# Patient Record
Sex: FEMALE | Race: WHITE | NOT HISPANIC OR LATINO | Employment: UNEMPLOYED | URBAN - METROPOLITAN AREA
[De-identification: names, ages, dates, MRNs, and addresses within clinical notes are randomized per-mention and may not be internally consistent; named-entity substitution may affect disease eponyms.]

---

## 2017-06-12 ENCOUNTER — TRANSCRIBE ORDERS (OUTPATIENT)
Dept: ADMINISTRATIVE | Facility: HOSPITAL | Age: 47
End: 2017-06-12

## 2017-06-12 ENCOUNTER — ALLSCRIPTS OFFICE VISIT (OUTPATIENT)
Dept: OTHER | Facility: OTHER | Age: 47
End: 2017-06-12

## 2017-06-12 DIAGNOSIS — D64.9 ANEMIA: ICD-10-CM

## 2017-06-12 DIAGNOSIS — R63.4 ABNORMAL WEIGHT LOSS: ICD-10-CM

## 2017-06-12 DIAGNOSIS — R74.8 ABNORMAL LEVELS OF OTHER SERUM ENZYMES: ICD-10-CM

## 2017-06-12 DIAGNOSIS — R63.4 LOSS OF WEIGHT: Primary | ICD-10-CM

## 2017-06-25 ENCOUNTER — APPOINTMENT (EMERGENCY)
Dept: CT IMAGING | Facility: HOSPITAL | Age: 47
End: 2017-06-25
Payer: COMMERCIAL

## 2017-06-25 ENCOUNTER — HOSPITAL ENCOUNTER (EMERGENCY)
Facility: HOSPITAL | Age: 47
Discharge: HOME | End: 2017-06-25
Attending: EMERGENCY MEDICINE | Admitting: EMERGENCY MEDICINE
Payer: COMMERCIAL

## 2017-06-25 VITALS
DIASTOLIC BLOOD PRESSURE: 77 MMHG | OXYGEN SATURATION: 97 % | SYSTOLIC BLOOD PRESSURE: 125 MMHG | HEART RATE: 95 BPM | TEMPERATURE: 98.5 F | RESPIRATION RATE: 18 BRPM

## 2017-06-25 DIAGNOSIS — E87.1 HYPONATREMIA: ICD-10-CM

## 2017-06-25 DIAGNOSIS — F41.9 ANXIETY: Primary | ICD-10-CM

## 2017-06-25 LAB
ALBUMIN SERPL BCP-MCNC: 4.2 G/DL (ref 3.5–5)
ALP SERPL-CCNC: 166 U/L (ref 46–116)
ALT SERPL W P-5'-P-CCNC: 27 U/L (ref 12–78)
AMPHETAMINES SERPL QL SCN: NEGATIVE
ANION GAP BLD CALC-SCNC: 13 MMOL/L (ref 4–13)
ANION GAP SERPL CALCULATED.3IONS-SCNC: 7 MMOL/L (ref 4–13)
AST SERPL W P-5'-P-CCNC: 18 U/L (ref 5–45)
ATRIAL RATE: 108 BPM
BARBITURATES UR QL: NEGATIVE
BASOPHILS # BLD AUTO: 0.09 THOUSANDS/ΜL (ref 0–0.1)
BASOPHILS NFR BLD AUTO: 1 % (ref 0–1)
BENZODIAZ UR QL: NEGATIVE
BILIRUB SERPL-MCNC: 0.25 MG/DL (ref 0.2–1)
BILIRUB UR QL STRIP: NEGATIVE
BUN BLD-MCNC: 14 MG/DL (ref 5–25)
BUN SERPL-MCNC: 17 MG/DL (ref 5–25)
CA-I BLD-SCNC: 1.18 MMOL/L (ref 1.12–1.32)
CALCIUM SERPL-MCNC: 10 MG/DL (ref 8.3–10.1)
CHLORIDE BLD-SCNC: 105 MMOL/L (ref 100–108)
CHLORIDE SERPL-SCNC: 96 MMOL/L (ref 100–108)
CLARITY UR: ABNORMAL
CO2 SERPL-SCNC: 26 MMOL/L (ref 21–32)
COCAINE UR QL: NEGATIVE
COLOR UR: YELLOW
CREAT BLD-MCNC: 1.1 MG/DL (ref 0.6–1.3)
CREAT SERPL-MCNC: 1.36 MG/DL (ref 0.6–1.3)
EOSINOPHIL # BLD AUTO: 0.17 THOUSAND/ΜL (ref 0–0.61)
EOSINOPHIL NFR BLD AUTO: 1 % (ref 0–6)
ERYTHROCYTE [DISTWIDTH] IN BLOOD BY AUTOMATED COUNT: 16.6 % (ref 11.6–15.1)
ETHANOL SERPL-MCNC: 82 MG/DL (ref 0–3)
GFR SERPL CREATININE-BSD FRML MDRD: 41.5 ML/MIN/1.73SQ M
GFR SERPL CREATININE-BSD FRML MDRD: 53 ML/MIN/1.73SQ M
GLUCOSE SERPL-MCNC: 76 MG/DL (ref 65–140)
GLUCOSE SERPL-MCNC: 94 MG/DL (ref 65–140)
GLUCOSE UR STRIP-MCNC: NEGATIVE MG/DL
HCG UR QL: NORMAL
HCT VFR BLD AUTO: 43.7 % (ref 34.8–46.1)
HCT VFR BLD CALC: 39 % (ref 34.8–46.1)
HGB BLD-MCNC: 14.6 G/DL (ref 11.5–15.4)
HGB BLDA-MCNC: 13.3 G/DL (ref 11.5–15.4)
HGB UR QL STRIP.AUTO: ABNORMAL
KETONES UR STRIP-MCNC: NEGATIVE MG/DL
LEUKOCYTE ESTERASE UR QL STRIP: NEGATIVE
LIPASE SERPL-CCNC: 302 U/L (ref 73–393)
LYMPHOCYTES # BLD AUTO: 2.62 THOUSANDS/ΜL (ref 0.6–4.47)
LYMPHOCYTES NFR BLD AUTO: 16 % (ref 14–44)
MCH RBC QN AUTO: 34.3 PG (ref 26.8–34.3)
MCHC RBC AUTO-ENTMCNC: 33.4 G/DL (ref 31.4–37.4)
MCV RBC AUTO: 103 FL (ref 82–98)
METHADONE UR QL: NEGATIVE
MONOCYTES # BLD AUTO: 0.77 THOUSAND/ΜL (ref 0.17–1.22)
MONOCYTES NFR BLD AUTO: 5 % (ref 4–12)
NEUTROPHILS # BLD AUTO: 13.21 THOUSANDS/ΜL (ref 1.85–7.62)
NEUTS SEG NFR BLD AUTO: 77 % (ref 43–75)
NITRITE UR QL STRIP: NEGATIVE
NRBC BLD AUTO-RTO: 0 /100 WBCS
OPIATES UR QL SCN: NEGATIVE
PCO2 BLD: 23 MMOL/L (ref 21–32)
PCP UR QL: NEGATIVE
PH UR STRIP.AUTO: 7 [PH] (ref 4.5–8)
PLATELET # BLD AUTO: 349 THOUSANDS/UL (ref 149–390)
PMV BLD AUTO: 10.1 FL (ref 8.9–12.7)
POTASSIUM BLD-SCNC: 4.2 MMOL/L (ref 3.5–5.3)
POTASSIUM SERPL-SCNC: 4.2 MMOL/L (ref 3.5–5.3)
PR INTERVAL: 140 MS
PROT SERPL-MCNC: 8.2 G/DL (ref 6.4–8.2)
PROT UR STRIP-MCNC: NEGATIVE MG/DL
QRS AXIS: 83 DEGREES
QRSD INTERVAL: 72 MS
QT INTERVAL: 310 MS
QTC INTERVAL: 415 MS
RBC # BLD AUTO: 4.26 MILLION/UL (ref 3.81–5.12)
SODIUM BLD-SCNC: 135 MMOL/L (ref 136–145)
SODIUM SERPL-SCNC: 129 MMOL/L (ref 136–145)
SP GR UR STRIP.AUTO: 1.01 (ref 1–1.03)
SPECIMEN SOURCE: ABNORMAL
SPECIMEN SOURCE: NORMAL
T WAVE AXIS: -42 DEGREES
THC UR QL: POSITIVE
TROPONIN I BLD-MCNC: 0 NG/ML (ref 0–0.08)
TSH SERPL DL<=0.05 MIU/L-ACNC: 3.11 UIU/ML (ref 0.36–3.74)
UROBILINOGEN UR QL STRIP.AUTO: 0.2 E.U./DL
VENTRICULAR RATE: 108 BPM
WBC # BLD AUTO: 16.86 THOUSAND/UL (ref 4.31–10.16)

## 2017-06-25 PROCEDURE — 80307 DRUG TEST PRSMV CHEM ANLYZR: CPT | Performed by: EMERGENCY MEDICINE

## 2017-06-25 PROCEDURE — 81002 URINALYSIS NONAUTO W/O SCOPE: CPT | Performed by: EMERGENCY MEDICINE

## 2017-06-25 PROCEDURE — 80053 COMPREHEN METABOLIC PANEL: CPT | Performed by: EMERGENCY MEDICINE

## 2017-06-25 PROCEDURE — 99285 EMERGENCY DEPT VISIT HI MDM: CPT

## 2017-06-25 PROCEDURE — 84484 ASSAY OF TROPONIN QUANT: CPT

## 2017-06-25 PROCEDURE — 96375 TX/PRO/DX INJ NEW DRUG ADDON: CPT

## 2017-06-25 PROCEDURE — 83690 ASSAY OF LIPASE: CPT | Performed by: EMERGENCY MEDICINE

## 2017-06-25 PROCEDURE — 81025 URINE PREGNANCY TEST: CPT | Performed by: EMERGENCY MEDICINE

## 2017-06-25 PROCEDURE — 96374 THER/PROPH/DIAG INJ IV PUSH: CPT

## 2017-06-25 PROCEDURE — 85025 COMPLETE CBC W/AUTO DIFF WBC: CPT | Performed by: EMERGENCY MEDICINE

## 2017-06-25 PROCEDURE — 96361 HYDRATE IV INFUSION ADD-ON: CPT

## 2017-06-25 PROCEDURE — 96376 TX/PRO/DX INJ SAME DRUG ADON: CPT

## 2017-06-25 PROCEDURE — G0480 DRUG TEST DEF 1-7 CLASSES: HCPCS | Performed by: EMERGENCY MEDICINE

## 2017-06-25 PROCEDURE — 80047 BASIC METABLC PNL IONIZED CA: CPT

## 2017-06-25 PROCEDURE — 93005 ELECTROCARDIOGRAM TRACING: CPT | Performed by: EMERGENCY MEDICINE

## 2017-06-25 PROCEDURE — 36415 COLL VENOUS BLD VENIPUNCTURE: CPT | Performed by: EMERGENCY MEDICINE

## 2017-06-25 PROCEDURE — 85014 HEMATOCRIT: CPT

## 2017-06-25 PROCEDURE — 74177 CT ABD & PELVIS W/CONTRAST: CPT

## 2017-06-25 PROCEDURE — 84443 ASSAY THYROID STIM HORMONE: CPT | Performed by: EMERGENCY MEDICINE

## 2017-06-25 PROCEDURE — 80320 DRUG SCREEN QUANTALCOHOLS: CPT | Performed by: EMERGENCY MEDICINE

## 2017-06-25 RX ORDER — PEG-3350, SODIUM SULFATE, SODIUM CHLORIDE, POTASSIUM CHLORIDE, SODIUM ASCORBATE AND ASCORBIC ACID 7.5-2.691G
KIT ORAL
COMMUNITY
Start: 2017-06-12 | End: 2017-12-15

## 2017-06-25 RX ORDER — LISINOPRIL 10 MG/1
10 TABLET ORAL DAILY
COMMUNITY
End: 2018-05-30 | Stop reason: HOSPADM

## 2017-06-25 RX ORDER — VENLAFAXINE 37.5 MG/1
75 TABLET ORAL DAILY
COMMUNITY
End: 2018-05-20

## 2017-06-25 RX ORDER — OMEPRAZOLE 40 MG/1
40 CAPSULE, DELAYED RELEASE ORAL DAILY
COMMUNITY
End: 2018-05-20

## 2017-06-25 RX ORDER — LITHIUM CARBONATE 300 MG
TABLET ORAL
COMMUNITY
End: 2017-12-15

## 2017-06-25 RX ORDER — ONDANSETRON 2 MG/ML
4 INJECTION INTRAMUSCULAR; INTRAVENOUS ONCE
Status: COMPLETED | OUTPATIENT
Start: 2017-06-25 | End: 2017-06-25

## 2017-06-25 RX ORDER — HYOSCYAMINE SULFATE 0.125 MG
TABLET ORAL
COMMUNITY
End: 2017-12-15

## 2017-06-25 RX ORDER — ONDANSETRON 4 MG/1
TABLET, FILM COATED ORAL
COMMUNITY
End: 2017-12-15

## 2017-06-25 RX ORDER — DICYCLOMINE HCL 20 MG
TABLET ORAL
COMMUNITY
End: 2017-12-15

## 2017-06-25 RX ORDER — LORAZEPAM 2 MG/ML
1 INJECTION INTRAMUSCULAR ONCE
Status: COMPLETED | OUTPATIENT
Start: 2017-06-25 | End: 2017-06-25

## 2017-06-25 RX ORDER — CLONAZEPAM 1 MG/1
TABLET ORAL
COMMUNITY
End: 2017-12-15

## 2017-06-25 RX ADMIN — SODIUM CHLORIDE 1000 ML: 0.9 INJECTION, SOLUTION INTRAVENOUS at 12:37

## 2017-06-25 RX ADMIN — LORAZEPAM 1 MG: 2 INJECTION INTRAMUSCULAR; INTRAVENOUS at 14:46

## 2017-06-25 RX ADMIN — SODIUM CHLORIDE 1000 ML: 0.9 INJECTION, SOLUTION INTRAVENOUS at 14:43

## 2017-06-25 RX ADMIN — IODIXANOL 100 ML: 320 INJECTION, SOLUTION INTRAVASCULAR at 13:49

## 2017-06-25 RX ADMIN — LORAZEPAM 1 MG: 2 INJECTION INTRAMUSCULAR; INTRAVENOUS at 12:28

## 2017-06-25 RX ADMIN — ONDANSETRON 4 MG: 2 INJECTION INTRAMUSCULAR; INTRAVENOUS at 12:26

## 2017-07-05 ENCOUNTER — GENERIC CONVERSION - ENCOUNTER (OUTPATIENT)
Dept: OTHER | Facility: OTHER | Age: 47
End: 2017-07-05

## 2017-07-14 ENCOUNTER — GENERIC CONVERSION - ENCOUNTER (OUTPATIENT)
Dept: OTHER | Facility: OTHER | Age: 47
End: 2017-07-14

## 2017-12-15 ENCOUNTER — APPOINTMENT (EMERGENCY)
Dept: RADIOLOGY | Facility: HOSPITAL | Age: 47
End: 2017-12-15
Payer: COMMERCIAL

## 2017-12-15 ENCOUNTER — HOSPITAL ENCOUNTER (EMERGENCY)
Facility: HOSPITAL | Age: 47
Discharge: HOME | End: 2017-12-15
Attending: EMERGENCY MEDICINE
Payer: COMMERCIAL

## 2017-12-15 VITALS
HEART RATE: 95 BPM | DIASTOLIC BLOOD PRESSURE: 58 MMHG | TEMPERATURE: 97.5 F | SYSTOLIC BLOOD PRESSURE: 124 MMHG | OXYGEN SATURATION: 97 % | RESPIRATION RATE: 16 BRPM | WEIGHT: 180 LBS

## 2017-12-15 DIAGNOSIS — J06.9 URI (UPPER RESPIRATORY INFECTION): Primary | ICD-10-CM

## 2017-12-15 DIAGNOSIS — J02.9 SORE THROAT: ICD-10-CM

## 2017-12-15 LAB
ALBUMIN SERPL BCP-MCNC: 3.6 G/DL (ref 3.5–5)
ALP SERPL-CCNC: 81 U/L (ref 46–116)
ALT SERPL W P-5'-P-CCNC: 21 U/L (ref 12–78)
ANION GAP SERPL CALCULATED.3IONS-SCNC: 10 MMOL/L (ref 4–13)
ANISOCYTOSIS BLD QL SMEAR: PRESENT
AST SERPL W P-5'-P-CCNC: 16 U/L (ref 5–45)
BACTERIA UR QL AUTO: ABNORMAL /HPF
BASOPHILS # BLD MANUAL: 0 THOUSAND/UL (ref 0–0.1)
BASOPHILS NFR MAR MANUAL: 0 % (ref 0–1)
BILIRUB SERPL-MCNC: 0.27 MG/DL (ref 0.2–1)
BILIRUB UR QL STRIP: NEGATIVE
BUN SERPL-MCNC: 7 MG/DL (ref 5–25)
CALCIUM SERPL-MCNC: 9.7 MG/DL (ref 8.3–10.1)
CHLORIDE SERPL-SCNC: 99 MMOL/L (ref 100–108)
CLARITY UR: CLEAR
CO2 SERPL-SCNC: 25 MMOL/L (ref 21–32)
COLOR UR: YELLOW
CREAT SERPL-MCNC: 0.72 MG/DL (ref 0.6–1.3)
DEPRECATED D DIMER PPP: 319 NG/ML (FEU) (ref 0–424)
EOSINOPHIL # BLD MANUAL: 0 THOUSAND/UL (ref 0–0.4)
EOSINOPHIL NFR BLD MANUAL: 0 % (ref 0–6)
ERYTHROCYTE [DISTWIDTH] IN BLOOD BY AUTOMATED COUNT: 16.1 % (ref 11.6–15.1)
EXT PREG TEST URINE: NORMAL
GFR SERPL CREATININE-BSD FRML MDRD: 99 ML/MIN/1.73SQ M
GLUCOSE SERPL-MCNC: 64 MG/DL (ref 65–140)
GLUCOSE UR STRIP-MCNC: NEGATIVE MG/DL
HCT VFR BLD AUTO: 40.7 % (ref 34.8–46.1)
HGB BLD-MCNC: 14.2 G/DL (ref 11.5–15.4)
HGB UR QL STRIP.AUTO: ABNORMAL
KETONES UR STRIP-MCNC: NEGATIVE MG/DL
LEUKOCYTE ESTERASE UR QL STRIP: NEGATIVE
LIPASE SERPL-CCNC: 117 U/L (ref 73–393)
LYMPHOCYTES # BLD AUTO: 3.38 THOUSAND/UL (ref 0.6–4.47)
LYMPHOCYTES # BLD AUTO: 45 % (ref 14–44)
MCH RBC QN AUTO: 35.4 PG (ref 26.8–34.3)
MCHC RBC AUTO-ENTMCNC: 34.9 G/DL (ref 31.4–37.4)
MCV RBC AUTO: 102 FL (ref 82–98)
MONOCYTES # BLD AUTO: 0.38 THOUSAND/UL (ref 0–1.22)
MONOCYTES NFR BLD: 5 % (ref 4–12)
NEUTROPHILS # BLD MANUAL: 3.76 THOUSAND/UL (ref 1.85–7.62)
NEUTS BAND NFR BLD MANUAL: 9 % (ref 0–8)
NEUTS SEG NFR BLD AUTO: 41 % (ref 43–75)
NITRITE UR QL STRIP: NEGATIVE
NON-SQ EPI CELLS URNS QL MICRO: ABNORMAL /HPF
PH UR STRIP.AUTO: 5 [PH] (ref 4.5–8)
PLATELET # BLD AUTO: 419 THOUSANDS/UL (ref 149–390)
PLATELET BLD QL SMEAR: ABNORMAL
PMV BLD AUTO: 9 FL (ref 8.9–12.7)
POTASSIUM SERPL-SCNC: 4.2 MMOL/L (ref 3.5–5.3)
PROT SERPL-MCNC: 7.6 G/DL (ref 6.4–8.2)
PROT UR STRIP-MCNC: NEGATIVE MG/DL
RBC # BLD AUTO: 4.01 MILLION/UL (ref 3.81–5.12)
RBC #/AREA URNS AUTO: ABNORMAL /HPF
SODIUM SERPL-SCNC: 134 MMOL/L (ref 136–145)
SP GR UR STRIP.AUTO: <=1.005 (ref 1–1.03)
STOMATOCYTES BLD QL SMEAR: PRESENT
TOTAL CELLS COUNTED SPEC: 100
UROBILINOGEN UR QL STRIP.AUTO: 0.2 E.U./DL
WBC # BLD AUTO: 7.52 THOUSAND/UL (ref 4.31–10.16)
WBC #/AREA URNS AUTO: ABNORMAL /HPF

## 2017-12-15 PROCEDURE — 99284 EMERGENCY DEPT VISIT MOD MDM: CPT

## 2017-12-15 PROCEDURE — 96374 THER/PROPH/DIAG INJ IV PUSH: CPT

## 2017-12-15 PROCEDURE — 83690 ASSAY OF LIPASE: CPT | Performed by: EMERGENCY MEDICINE

## 2017-12-15 PROCEDURE — 80053 COMPREHEN METABOLIC PANEL: CPT | Performed by: EMERGENCY MEDICINE

## 2017-12-15 PROCEDURE — 81025 URINE PREGNANCY TEST: CPT | Performed by: EMERGENCY MEDICINE

## 2017-12-15 PROCEDURE — 81001 URINALYSIS AUTO W/SCOPE: CPT

## 2017-12-15 PROCEDURE — 85007 BL SMEAR W/DIFF WBC COUNT: CPT | Performed by: EMERGENCY MEDICINE

## 2017-12-15 PROCEDURE — 85379 FIBRIN DEGRADATION QUANT: CPT | Performed by: EMERGENCY MEDICINE

## 2017-12-15 PROCEDURE — 85027 COMPLETE CBC AUTOMATED: CPT | Performed by: EMERGENCY MEDICINE

## 2017-12-15 PROCEDURE — 81002 URINALYSIS NONAUTO W/O SCOPE: CPT | Performed by: EMERGENCY MEDICINE

## 2017-12-15 PROCEDURE — 96361 HYDRATE IV INFUSION ADD-ON: CPT

## 2017-12-15 PROCEDURE — 36415 COLL VENOUS BLD VENIPUNCTURE: CPT | Performed by: EMERGENCY MEDICINE

## 2017-12-15 PROCEDURE — 96375 TX/PRO/DX INJ NEW DRUG ADDON: CPT

## 2017-12-15 PROCEDURE — 71020 HB CHEST X-RAY 2VW FRONTAL&LATL: CPT

## 2017-12-15 RX ORDER — GABAPENTIN 300 MG/1
100 CAPSULE ORAL 3 TIMES DAILY
COMMUNITY
End: 2018-05-20

## 2017-12-15 RX ORDER — FLUTICASONE PROPIONATE 50 MCG
2 SPRAY, SUSPENSION (ML) NASAL DAILY
COMMUNITY
End: 2018-10-31 | Stop reason: HOSPADM

## 2017-12-15 RX ORDER — BUDESONIDE AND FORMOTEROL FUMARATE DIHYDRATE 160; 4.5 UG/1; UG/1
2 AEROSOL RESPIRATORY (INHALATION) 2 TIMES DAILY
COMMUNITY
End: 2019-05-20 | Stop reason: SDUPTHER

## 2017-12-15 RX ORDER — QUETIAPINE FUMARATE 200 MG/1
200 TABLET, FILM COATED ORAL
COMMUNITY
End: 2018-05-20

## 2017-12-15 RX ORDER — DULOXETIN HYDROCHLORIDE 30 MG/1
30 CAPSULE, DELAYED RELEASE ORAL DAILY
COMMUNITY
End: 2018-05-30 | Stop reason: HOSPADM

## 2017-12-15 RX ORDER — DULOXETIN HYDROCHLORIDE 60 MG/1
30 CAPSULE, DELAYED RELEASE ORAL
COMMUNITY
End: 2018-05-30 | Stop reason: HOSPADM

## 2017-12-15 RX ORDER — ONDANSETRON 2 MG/ML
4 INJECTION INTRAMUSCULAR; INTRAVENOUS ONCE
Status: COMPLETED | OUTPATIENT
Start: 2017-12-15 | End: 2017-12-15

## 2017-12-15 RX ORDER — CARBAMAZEPINE 200 MG/1
100 TABLET, EXTENDED RELEASE ORAL DAILY
COMMUNITY
End: 2018-05-20

## 2017-12-15 RX ORDER — CLONAZEPAM 1 MG/1
0.5 TABLET ORAL 3 TIMES DAILY PRN
COMMUNITY
End: 2018-05-20

## 2017-12-15 RX ORDER — KETOROLAC TROMETHAMINE 30 MG/ML
30 INJECTION, SOLUTION INTRAMUSCULAR; INTRAVENOUS ONCE
Status: COMPLETED | OUTPATIENT
Start: 2017-12-15 | End: 2017-12-15

## 2017-12-15 RX ADMIN — KETOROLAC TROMETHAMINE 30 MG: 30 INJECTION, SOLUTION INTRAMUSCULAR at 12:18

## 2017-12-15 RX ADMIN — ONDANSETRON 4 MG: 2 INJECTION INTRAMUSCULAR; INTRAVENOUS at 12:27

## 2017-12-15 RX ADMIN — SODIUM CHLORIDE 1000 ML: 0.9 INJECTION, SOLUTION INTRAVENOUS at 11:37

## 2017-12-15 NOTE — ED NOTES
Medicated for nausea  States had a loose BM in the bathroom  Patient given some ginger ale and crackers- states she has had nothing to eat since yesterday        Matt Harvey RN  12/15/17 3618

## 2017-12-15 NOTE — ED NOTES
Pt back from 10 [facility], RN  12/15/17 205 [facility], RN  12/15/17 2281

## 2017-12-15 NOTE — ED NOTES
Sent by Dr Liban Owens for eval   Chronic cough for 1 month with hx COPD and smokes  Has had  Course of Z pack and steroids  Patient tearful, limp in WC, doesn't open her eyes, depressed affect     Dry cough noted- states it hurts her right ribs- had fractured rib last year  Developed N/V/D last night  C/o headache and weakness       Edison Alfaro, RN  12/15/17 8522

## 2017-12-15 NOTE — DISCHARGE INSTRUCTIONS
Upper Respiratory Infection   WHAT YOU NEED TO KNOW:   What is an upper respiratory infection? An upper respiratory infection is also called a common cold  It can affect your nose, throat, ears, and sinuses  What causes a cold? The common cold is caused by a virus  There are many different cold viruses, and each is contagious  This means the virus can be easily spread to another person when the sick person coughs or sneezes  The virus can also be spread if you touch something that a person with a cold has touched  You are more likely to get a cold in the winter  Your risk of getting a cold may be increased if you smoke cigarettes or have allergies, such as hay fever  What are the signs and symptoms of a cold? Cold symptoms are usually worst for the first 3 to 5 days  You may have any of the following:  · Runny or stuffy nose    · Sneezing and coughing    · Sore throat or hoarseness    · Red, watery, and sore eyes    · Fatigue     · Chills and fever    · Headache, body aches, or sore muscles  How is a cold treated? There is no cure for the common cold  Colds are caused by viruses and do not get better with antibiotics  Most people get better in 7 to 14 days  You may continue to cough for 2 to 3 weeks  The following may help decrease your symptoms:  · Decongestants  help reduce nasal congestion and help you breathe more easily  If you take decongestant pills, they may make you feel restless or cause problems with your sleep  Do not use decongestant sprays for more than a few days  · Cough suppressants  help reduce coughing  Ask your healthcare provider which type of cough medicine is best for you  · NSAIDs , such as ibuprofen, help decrease swelling, pain, and fever  NSAIDs can cause stomach bleeding or kidney problems in certain people  If you take blood thinner medicine, always ask your healthcare provider if NSAIDs are safe for you   Always read the medicine label and follow directions  · Acetaminophen  decreases pain and fever  It is available without a doctor's order  Ask how much to take and how often to take it  Follow directions  Read the labels of all other medicines you are using to see if they also contain acetaminophen, or ask your doctor or pharmacist  Acetaminophen can cause liver damage if not taken correctly  Do not use more than 4 grams (4,000 milligrams) total of acetaminophen in one day  How can I manage my cold? · Rest as much as possible  Slowly start to do more each day  · Drink more liquids as directed  Liquids will help thin and loosen mucus so you can cough it up  Liquids will also help prevent dehydration  Liquids that help prevent dehydration include water, fruit juice, and broth  Do not drink liquids that contain caffeine  Caffeine can increase your risk for dehydration  Ask your healthcare provider how much liquid to drink each day  · Soothe a sore throat  Gargle with warm salt water  This helps your sore throat feel better  Make salt water by dissolving ¼ teaspoon salt in 1 cup warm water  You may also suck on hard candy or throat lozenges  You may use a sore throat spray  · Use a humidifier or vaporizer  Use a cool mist humidifier or a vaporizer to increase air moisture in your home  This may make it easier for you to breathe and help decrease your cough  · Use saline nasal drops as directed  These help relieve congestion  · Apply petroleum-based jelly around the outside of your nostrils  This can decrease irritation from blowing your nose  · Do not smoke  Nicotine and other chemicals in cigarettes and cigars can make your symptoms worse  They can also cause infections such as bronchitis or pneumonia  Ask your healthcare provider for information if you currently smoke and need help to quit  E-cigarettes or smokeless tobacco still contain nicotine  Talk to your healthcare provider before you use these products    What can I do to prevent the spread of the common cold? · Try to stay away from other people during the first 2 to 3 days of your cold when it is more easily spread  · Do not share food or drinks  · Do not share hand towels with household members  · Wash your hands often, especially after you blow your nose  Turn away from other people and cover your mouth and nose with a tissue when you sneeze or cough  When should I seek immediate care? · You have chest pain or trouble breathing  When should I contact my healthcare provider? · You have a fever over 102ºF (39ºC)  · Your sore throat gets worse or you see white or yellow spots in your throat  · Your symptoms get worse after 3 to 5 days or your cold is not better in 14 days  · You have a rash anywhere on your skin  · You have large, tender lumps in your neck  · You have thick, green, or yellow drainage from your nose  · You cough up thick yellow, green, or bloody mucus  · You are vomiting for more than 24 hours and cannot keep fluids down  · You have a bad earache  · You have questions or concerns about your condition or care  CARE AGREEMENT:   You have the right to help plan your care  Learn about your health condition and how it may be treated  Discuss treatment options with your caregivers to decide what care you want to receive  You always have the right to refuse treatment  The above information is an  only  It is not intended as medical advice for individual conditions or treatments  Talk to your doctor, nurse or pharmacist before following any medical regimen to see if it is safe and effective for you  © 2017 [employer]  Information is for End User's use only and may not be sold, redistributed or otherwise used for commercial purposes  All illustrations and images included in CareNotes® are the copyrighted property of [employer]  or [employer]

## 2017-12-15 NOTE — ED NOTES
Patient is refusing CT of the neck  Requested to speak with Dr Thuy Mcdowell who was notified       Erickson Tena RN  12/15/17 0756

## 2017-12-15 NOTE — ED PROVIDER NOTES
History  Chief Complaint   Patient presents with    Cough     pt sent over from doctor  CAROLINA taylor for 1 month now with loss of voice  pt now with headache and diarrhea   had respiratory panel done which was negative  sent here for further evaluation and CXR  History provided by:  Patient   used: No    Cough   Cough characteristics:  Non-productive  Sputum characteristics:  Nondescript  Severity:  Mild  Onset quality:  Gradual  Duration:  4 weeks  Timing:  Intermittent  Progression:  Waxing and waning  Chronicity:  New  Smoker: yes    Context: upper respiratory infection and weather changes    Relieved by:  Nothing  Worsened by:  Nothing  Ineffective treatments:  Beta-agonist inhaler  Associated symptoms: sore throat    Associated symptoms: no chest pain, no chills, no fever, no headaches, no rash and no shortness of breath    Sore throat:     Severity:  Moderate    Onset quality:  Gradual    Duration:  2 weeks    Timing:  Intermittent    Progression:  Waxing and waning  Risk factors comment:  Smoker, Rib fractures 1 yr ago      Prior to Admission Medications   Prescriptions Last Dose Informant Patient Reported? Taking?    DULoxetine (CYMBALTA) 30 mg delayed release capsule 12/15/2017 at 0500  Yes Yes   Sig: Take 30 mg by mouth daily   DULoxetine (CYMBALTA) 60 mg delayed release capsule 12/14/2017  Yes Yes   Sig: Take 30 mg by mouth daily at bedtime   QUEtiapine (SEROquel) 200 mg tablet 12/14/2017 at Unknown time  Yes Yes   Sig: Take 200 mg by mouth daily at bedtime   budesonide-formoterol (SYMBICORT) 160-4 5 mcg/act inhaler 12/15/2017 at 0500  Yes Yes   Sig: Inhale 2 puffs 2 (two) times a day   carBAMazepine (TEGretol XR) 200 mg 12 hr tablet 12/15/2017 at 0500  Yes Yes   Sig: Take 100 mg by mouth daily   clonazePAM (KlonoPIN) 1 mg tablet 12/15/2017 at 0500  Yes Yes   Sig: Take 0 5 mg by mouth 3 (three) times a day as needed for seizures   fluticasone (FLONASE) 50 mcg/act nasal spray Past Month  Yes Yes   Si sprays into each nostril daily Not taking recently    gabapentin (NEURONTIN) 300 mg capsule 12/15/2017 at 0500  Yes Yes   Sig: Take 100 mg by mouth 3 (three) times a day   lisinopril (ZESTRIL) 10 mg tablet 12/15/2017 at 0500  Yes Yes   Sig: Take 10 mg by mouth daily     omeprazole (PriLOSEC) 40 MG capsule 12/15/2017 at 0500  Yes Yes   Sig: Take 40 mg by mouth daily     venlafaxine (EFFEXOR) 37 5 mg tablet 12/15/2017 at 0500  Yes Yes   Sig: Take 75 mg by mouth daily        Facility-Administered Medications: None       Past Medical History:   Diagnosis Date    Bipolar disorder ([facility] )     COPD (chronic obstructive pulmonary disease) ([facility] )     Depression     Hyperlipidemia     Hypertension     Psychiatric disorder        Past Surgical History:   Procedure Laterality Date    PANCREAS SURGERY      "pseudocysts" per patient's  Ricki Infante       History reviewed  No pertinent family history  I have reviewed and agree with the history as documented  Social History   Substance Use Topics    Smoking status: Current Every Day Smoker     Packs/day: 1 00     Types: Cigarettes    Smokeless tobacco: Never Used    Alcohol use Yes        Review of Systems   Constitutional: Negative for activity change, chills and fever  HENT: Positive for sore throat  Negative for facial swelling and trouble swallowing  Eyes: Negative for pain and visual disturbance  Respiratory: Positive for cough  Negative for chest tightness and shortness of breath  Cardiovascular: Negative for chest pain and leg swelling  Gastrointestinal: Negative for abdominal pain, blood in stool, diarrhea, nausea and vomiting  Genitourinary: Negative for dysuria and flank pain  Musculoskeletal: Negative for back pain, neck pain and neck stiffness  Skin: Negative for pallor and rash  Allergic/Immunologic: Negative for environmental allergies and immunocompromised state     Neurological: Negative for dizziness and headaches  Hematological: Negative for adenopathy  Does not bruise/bleed easily  Psychiatric/Behavioral: Negative for agitation and behavioral problems  All other systems reviewed and are negative  Physical Exam  ED Triage Vitals [12/15/17 1057]   Temperature Pulse Respirations Blood Pressure SpO2   97 5 °F (36 4 °C) 100 20 126/69 99 %      Temp Source Heart Rate Source Patient Position - Orthostatic VS BP Location FiO2 (%)   Oral -- -- -- --      Pain Score       7           Orthostatic Vital Signs  Vitals:    12/15/17 1057 12/15/17 1224   BP: 126/69 124/58   Pulse: 100 95       Physical Exam   Constitutional: She is oriented to person, place, and time  She appears well-developed and well-nourished  No distress  HENT:   Head: Normocephalic and atraumatic  Eyes: EOM are normal    Neck: Normal range of motion  Neck supple  Cardiovascular: Normal rate, regular rhythm, normal heart sounds and intact distal pulses  Pulmonary/Chest: Effort normal and breath sounds normal    Abdominal: Soft  Bowel sounds are normal  There is no tenderness  There is no rebound and no guarding  Musculoskeletal: Normal range of motion  Neurological: She is alert and oriented to person, place, and time  Skin: Skin is warm and dry  Psychiatric: She has a normal mood and affect  Nursing note and vitals reviewed        ED Medications  Medications   sodium chloride 0 9 % bolus 1,000 mL (0 mL Intravenous Stopped 12/15/17 1220)   ketorolac (TORADOL) injection 30 mg (30 mg Intravenous Given 12/15/17 1218)   ondansetron (ZOFRAN) injection 4 mg (4 mg Intravenous Given 12/15/17 1227)       Diagnostic Studies  Results Reviewed     Procedure Component Value Units Date/Time    Urine Microscopic [[id number]]  (Abnormal) Collected:  12/15/17 1249    Lab Status:  Final result Specimen:  Urine from Urine, Clean Catch Updated:  12/15/17 1252     RBC, UA 0-1 (A) /hpf      WBC, UA 1-2 (A) /hpf      Epithelial Cells Occasional /hpf Bacteria, UA Occasional /hpf     POCT urinalysis dipstick [[id number]]  (Abnormal) Resulted:  12/15/17 1233    Lab Status:  Final result Specimen:  Urine Updated:  12/15/17 1233    POCT pregnancy, urine [[id number]]  (Normal) Resulted:  12/15/17 1233    Lab Status:  Final result Updated:  12/15/17 1233     EXT PREG TEST UR (Ref: Negative) HCG = neg (-)    ED Urine Macroscopic [[id number]]  (Abnormal) Collected:  12/15/17 1249    Lab Status:  Final result Specimen:  Urine Updated:  12/15/17 1232     Color, UA Yellow     Clarity, UA Clear     pH, UA 5 0     Leukocytes, UA Negative     Nitrite, UA Negative     Protein, UA Negative mg/dl      Glucose, UA Negative mg/dl      Ketones, UA Negative mg/dl      Urobilinogen, UA 0 2 E U /dl      Bilirubin, UA Negative     Blood, UA Trace (A)     Specific [place], UA <=1 005    Narrative:       CLINITEK RESULT    CBC and differential [[id number]]  (Abnormal) Collected:  12/15/17 1126    Lab Status:  Final result Specimen:  Blood from Arm, Right Updated:  12/15/17 1219     WBC 7 52 Thousand/uL      RBC 4 01 Million/uL      Hemoglobin 14 2 g/dL      Hematocrit 40 7 %       (H) fL      MCH 35 4 (H) pg      MCHC 34 9 g/dL      RDW 16 1 (H) %      MPV 9 0 fL      Platelets 011 (H) Thousands/uL     Narrative: This is an appended report  These results have been appended to a previously verified report      Comprehensive metabolic panel [[id number]]  (Abnormal) Collected:  12/15/17 1126    Lab Status:  Final result Specimen:  Blood from Arm, Right Updated:  12/15/17 1158     Sodium 134 (L) mmol/L      Potassium 4 2 mmol/L      Chloride 99 (L) mmol/L      CO2 25 mmol/L      Anion Gap 10 mmol/L      BUN 7 mg/dL      Creatinine 0 72 mg/dL      Glucose 64 (L) mg/dL      Calcium 9 7 mg/dL      AST 16 U/L      ALT 21 U/L      Alkaline Phosphatase 81 U/L      Total Protein 7 6 g/dL      Albumin 3 6 g/dL      Total Bilirubin 0 27 mg/dL      eGFR 99 ml/min/1 73sq m     Narrative: National Kidney Disease Education Program recommendations are as follows:  GFR calculation is accurate only with a steady state creatinine  Chronic Kidney disease less than 60 ml/min/1 73 sq  meters  Kidney failure less than 15 ml/min/1 73 sq  meters  Lipase [[id number]]  (Normal) Collected:  12/15/17 1126    Lab Status:  Final result Specimen:  Blood from Arm, Right Updated:  12/15/17 1158     Lipase 117 u/L     D-Dimer [[id number]]  (Normal) Collected:  12/15/17 1126    Lab Status:  Final result Specimen:  Blood from Arm, Right Updated:  12/15/17 1154     D-Dimer, Quant 319 ng/ml (FEU)                  XR chest 2 views   Final Result by Mc Lester MD (12/15 1204)      No acute findings         Workstation performed: [id number]                    Procedures  Procedures       Phone Contacts  ED Phone Contact    ED Course  ED Course as of Dec 15 1844   Fri Dec 15, 2017   1315 Patient informed about normal blood tests and chest x-ray findings  Patient does not want CT neck at this point, requests to be discharged, does not have drooling, trismus, no overt clinical signs of retropharyngeal or peritonsillar abscess at this point, will follow up with her doctor  MDM  Number of Diagnoses or Management Options  URI (upper respiratory infection): new and requires workup  Diagnosis management comments: Patient is a 49-year-old female, comes in with complaint of right-sided lower chest pain, states that she had rib fractures last year and the pain has been going on since then, recently treated by her PCP for cough and sore throat, had a course of antibiotics, azithromycin, also noted loss of voice, yesterday had vomiting and diarrhea and appears to be dehydrated  Patient was also checked for strep that was negative   On exam patient appears to be anxious, tearful, vital signs stable, afebrile, normal oxygen saturation, heart rate 100 on triage, no chest pain, mucas membranes dry, no pharyngeal exudates; lung exam show faint wheezing, no crackles, no peripheral edema, abdomen soft nontender,  Impression:  Chronic right lower chest pain from [facility] [place] fractures, URI sore throat, anxiety, COPD  Will check labs, chest x-ray, CT scan soft tissue neck as sore throat going on for couple of weeks with range of worse to rule out retropharyngeal or peritonsillar abscess; treat with Toradol, breathing treatment, IV fluids, possible viral GI illness with vomiting and diarrhea  Amount and/or Complexity of Data Reviewed  Clinical lab tests: reviewed and ordered  Tests in the radiology section of CPT®: ordered and reviewed  Tests in the medicine section of CPT®: ordered and reviewed  Independent visualization of images, tracings, or specimens: yes      CritCare Time    Disposition  Final diagnoses:   URI (upper respiratory infection)   Sore throat     Time reflects when diagnosis was documented in both MDM as applicable and the Disposition within this note     Time User Action Codes Description Comment    12/15/2017  1:18 PM Deborah Emmanuel Add [J06 9] URI (upper respiratory infection)     12/15/2017  6:44 PM Deborah Emmanuel Add [J02 9] Sore throat       ED Disposition     ED Disposition Condition Comment    Discharge  [facility] discharge to home/self care      Condition at discharge: Stable        Follow-up Information     Follow up With Specialties Details Why DO [facility]   [address] [place] [address] Drive  [phone]          Discharge Medication List as of 12/15/2017  1:19 PM      START taking these medications    Details   Dextromethorphan-Benzocaine (SORE THROAT & COUGH LOZENGES) 5-7 5 MG LOZG Apply 1 tablet to the mouth or throat once as needed (COUGH) for up to 1 dose, Starting Fri 12/15/2017, Print         CONTINUE these medications which have NOT CHANGED    Details   budesonide-formoterol (SYMBICORT) 160-4 5 mcg/act inhaler Inhale 2 puffs 2 (two) times a day, Historical Med      carBAMazepine (TEGretol XR) 200 mg 12 hr tablet Take 100 mg by mouth daily, Historical Med      clonazePAM (KlonoPIN) 1 mg tablet Take 0 5 mg by mouth 3 (three) times a day as needed for seizures, Historical Med      !! DULoxetine (CYMBALTA) 30 mg delayed release capsule Take 30 mg by mouth daily, Historical Med      !! DULoxetine (CYMBALTA) 60 mg delayed release capsule Take 30 mg by mouth daily at bedtime, Historical Med      fluticasone (FLONASE) 50 mcg/act nasal spray 2 sprays into each nostril daily Not taking recently , Historical Med      gabapentin (NEURONTIN) 300 mg capsule Take 100 mg by mouth 3 (three) times a day, Historical Med      lisinopril (ZESTRIL) 10 mg tablet Take 10 mg by mouth daily  , Historical Med      omeprazole (PriLOSEC) 40 MG capsule Take 40 mg by mouth daily  , Historical Med      QUEtiapine (SEROquel) 200 mg tablet Take 200 mg by mouth daily at bedtime, Historical Med      venlafaxine (EFFEXOR) 37 5 mg tablet Take 75 mg by mouth daily  , Historical Med       !! - Potential duplicate medications found  Please discuss with provider  No discharge procedures on file      ED Provider  Electronically Signed by           Kathleen Douglass MD  12/15/17 5380

## 2018-01-04 ENCOUNTER — GENERIC CONVERSION - ENCOUNTER (OUTPATIENT)
Dept: OTHER | Facility: OTHER | Age: 48
End: 2018-01-04

## 2018-01-23 NOTE — PROGRESS NOTES
Discussed with the patient  Her diarrhea is continuing  Recommended cholestyramine  She also has hoarseness and difficulty talking and there has been no infection found  I am concerned about vocal cord lesion  I have recommended  for her to see ENT  At this time I do not feel safe to do an endoscopy or colonoscopy  I discussed this with the patient  She is in agreement  Once she is cleared by ENT that we could proceed with the scopes        Electronically signed Luz Murillo MD  Jan 4 2018  3:38PM EST Author

## 2018-01-25 ENCOUNTER — OFFICE VISIT (OUTPATIENT)
Dept: GASTROENTEROLOGY | Facility: CLINIC | Age: 48
End: 2018-01-25
Payer: COMMERCIAL

## 2018-01-25 ENCOUNTER — TELEPHONE (OUTPATIENT)
Dept: GASTROENTEROLOGY | Facility: MEDICAL CENTER | Age: 48
End: 2018-01-25

## 2018-01-25 VITALS
HEART RATE: 101 BPM | HEIGHT: 66 IN | WEIGHT: 180.2 LBS | BODY MASS INDEX: 28.96 KG/M2 | TEMPERATURE: 98.3 F | DIASTOLIC BLOOD PRESSURE: 88 MMHG | SYSTOLIC BLOOD PRESSURE: 138 MMHG

## 2018-01-25 DIAGNOSIS — K58.0 IRRITABLE BOWEL SYNDROME WITH DIARRHEA: ICD-10-CM

## 2018-01-25 DIAGNOSIS — K52.9 CHRONIC DIARRHEA: Primary | ICD-10-CM

## 2018-01-25 DIAGNOSIS — D75.89 MACROCYTOSIS: ICD-10-CM

## 2018-01-25 PROCEDURE — 99213 OFFICE O/P EST LOW 20 MIN: CPT | Performed by: PHYSICIAN ASSISTANT

## 2018-01-25 RX ORDER — LISINOPRIL 10 MG/1
TABLET ORAL
COMMUNITY
End: 2018-05-20

## 2018-01-25 RX ORDER — OMEPRAZOLE 40 MG/1
1 CAPSULE, DELAYED RELEASE ORAL DAILY
COMMUNITY
End: 2018-10-31 | Stop reason: HOSPADM

## 2018-01-25 RX ORDER — DICYCLOMINE HCL 20 MG
1 TABLET ORAL EVERY 6 HOURS PRN
COMMUNITY
End: 2018-05-20

## 2018-01-25 RX ORDER — QUETIAPINE FUMARATE 50 MG/1
TABLET, FILM COATED ORAL
Status: ON HOLD | COMMUNITY
End: 2018-04-10 | Stop reason: ALTCHOICE

## 2018-01-25 RX ORDER — CHOLESTYRAMINE 4 G/9G
POWDER, FOR SUSPENSION ORAL
Refills: 0 | COMMUNITY
Start: 2018-01-04 | End: 2018-02-02 | Stop reason: SDUPTHER

## 2018-01-25 RX ORDER — ONDANSETRON 4 MG/1
1 TABLET, FILM COATED ORAL EVERY 8 HOURS PRN
COMMUNITY
End: 2018-05-20

## 2018-01-25 NOTE — ASSESSMENT & PLAN NOTE
She reports longstanding diarrhea and abdominal cramping  She denies alarm symptoms such as rectal bleeding and weight loss  She tested negative for C diff a few months ago  Her symptoms are likely secondary to irritable bowel syndrome with diarrhea  Recommend Xifaxan 550 milligrams 3 times daily for 2 weeks  We discussed common side effects  Discussed and gave patient a handout on low FODMAP diet  Discussed avoiding artificial sugars and juices

## 2018-01-25 NOTE — PROGRESS NOTES
Assessment/Plan:    Irritable bowel syndrome with diarrhea  She reports longstanding diarrhea and abdominal cramping  She denies alarm symptoms such as rectal bleeding and weight loss  She tested negative for C diff a few months ago  Her symptoms are likely secondary to irritable bowel syndrome with diarrhea  Recommend Xifaxan 550 milligrams 3 times daily for 2 weeks  We discussed common side effects  Discussed and gave patient a handout on low FODMAP diet  Discussed avoiding artificial sugars and juices  Chronic diarrhea  While her chronic diarrhea is likely secondary to irritable bowel syndrome with diarrhea, cannot rule out other causes of chronic diarrhea such as inflammatory bowel disease, celiac disease, or microscopic colitis  Order celiac panel to evaluate for gluten intolerance  We will schedule EGD and colonoscopy once cleared by ENT  Recommend duodenal biopsies and random colon biopsies to assess for celiac disease and microscopic colitis  Diagnoses and all orders for this visit:    Chronic diarrhea  -     Celiac Disease Antibody Profile; Future  -     Discontinue: rifaximin (XIFAXAN) 550 mg tablet; Take 1 tablet by mouth every 8 (eight) hours for 14 days    Macrocytosis  -     Vitamin B12; Future  -     Folate; Future    Irritable bowel syndrome with diarrhea  -     rifaximin (XIFAXAN) 550 mg tablet; Take 1 tablet by mouth every 8 (eight) hours for 14 days    Other orders  -     al mag oxide-diphenhydramine-lidocaine viscous (MAGIC MOUTHWASH); SWISH 1 TEASPOONFUL IN MOUTH EVERY 6 HOURS AS NEEDED  -     cholestyramine (QUESTRAN) 4 g packet; MIX THE CONTENTS OF 1 POWDER PACKET WITH 2-6 OZ OF NONCARBONATED BEVERAGE AND SWALLOW ONCE DAILY  -     dicyclomine (BENTYL) 20 mg tablet; Take 1 tablet by mouth every 6 (six) hours as needed  -     ondansetron (ZOFRAN) 4 mg tablet; Take 1 tablet by mouth every 8 (eight) hours as needed  -     omeprazole (PriLOSEC) 40 MG capsule;  Take 1 capsule by mouth Daily  -     QUEtiapine (SEROquel) 50 mg tablet; Take by mouth  -     lisinopril (ZESTRIL) 10 mg tablet; Take by mouth          Subjective:      Patient ID: Jh Valladares is a 50 y o  female  HPI     25-year-old female with history of tobacco abuse presenting for follow-up of abdominal pain  She last saw Dr Pily Hauser in the office June 2017  She complained of abdominal pain, change in bowel habits, and weight loss  CT abdomen pelvis with contrast showed fat containing umbilical hernia and distended bladder but was otherwise unremarkable  She was recommended EGD and colonoscopy however due to persistent laryngitis, she did not have these done yet  She saw an ENT doctor last week who recommended an antifungal medication for thrush  She states her symptoms have been persistent for the past few months  She has about 5-6 episodes of watery brown explosive diarrhea daily  She needs to wear depends as she is tired of throwing out her underwear  She has been taking Imodium and Bentyl without relief  She denies rectal bleeding  She has intermittent lower abdominal cramping  She has decreased appetite and occasional morning nausea when taking her medications  No vomiting or weight loss  She denies heartburn  She takes Prilosec 40 mg daily in the morning with Zantac at bedtime  The following portions of the patient's history were reviewed and updated as appropriate: allergies, current medications, past family history, past medical history, past social history, past surgical history and problem list     Review of Systems   Constitutional: Positive for appetite change and fatigue  Negative for activity change, chills and fever  HENT: Negative for hearing loss and sore throat  Eyes: Negative for visual disturbance  Respiratory: Negative for cough and shortness of breath  Cardiovascular: Negative for chest pain  Gastrointestinal:        See HPI   Genitourinary: Negative for dysuria     Musculoskeletal: Bilateral leg pain         Objective:     Physical Exam   Constitutional: She is oriented to person, place, and time  She appears well-developed and well-nourished  No distress  HENT:   Head: Normocephalic and atraumatic  Mouth/Throat: Oropharynx is clear and moist    Eyes: No scleral icterus  Cardiovascular: Normal rate and regular rhythm  Pulmonary/Chest: Effort normal and breath sounds normal    Abdominal: Soft  Bowel sounds are normal  She exhibits no distension  There is no tenderness  There is no rebound and no guarding  Musculoskeletal: She exhibits no edema  Lymphadenopathy:     She has no cervical adenopathy  Neurological: She is alert and oriented to person, place, and time  Skin: Skin is warm and dry  Psychiatric: She has a normal mood and affect

## 2018-02-01 ENCOUNTER — TELEPHONE (OUTPATIENT)
Dept: GASTROENTEROLOGY | Facility: MEDICAL CENTER | Age: 48
End: 2018-02-01

## 2018-02-02 DIAGNOSIS — R19.7 DIARRHEA, UNSPECIFIED TYPE: Primary | ICD-10-CM

## 2018-02-02 RX ORDER — CHOLESTYRAMINE 4 G/9G
POWDER, FOR SUSPENSION ORAL
Qty: 30 EACH | Refills: 2 | Status: SHIPPED | OUTPATIENT
Start: 2018-02-02 | End: 2018-05-20

## 2018-02-08 NOTE — TELEPHONE ENCOUNTER
Patients  called Jonda Phoenix asking about the status of Xifaxan  I called [facility] and spoke with [facility] Doctor [place], [address]  She stated that as of this morning, the patient has been appoved by her insurance and they will be calling her today to set up delivery  I called the patient and informed her

## 2018-02-27 ENCOUNTER — TELEPHONE (OUTPATIENT)
Dept: GASTROENTEROLOGY | Facility: CLINIC | Age: 48
End: 2018-02-27

## 2018-02-27 NOTE — TELEPHONE ENCOUNTER
Dr Alok Lucia pt     Pt called in stating that she was put on Xifaxin which she has been using for a while now but she doesn't have any changed in her symptoms with this medication  She would like a call back to discuss   [phone]

## 2018-02-27 NOTE — TELEPHONE ENCOUNTER
Called and spoke with patient, she does not feel she has had an improvement with xifaxan in her diarrhea  The Evelin Devries was previously helping her  She will stop the xifaxan and re-start questran until she can have a colonoscopy done   Thank you

## 2018-03-07 NOTE — PROGRESS NOTES
7/14/2017    Dear Vera Spain are lab orders to follow-up with your last visit, and CT scan order  Last blood work showed low hemoglobin level  Your thyroid level was also slightly low and this result may indicate  a need for Synthroid adjustment  Please follow-up with your primary physician for your Synthroid  The alkaline phosphatase test which is a level of the liver was also slightly abnormal  We have been unable to reach you to schedule a follow-up in office  Once labs have been obtained and previous CT scan that has been done please call and schedule an appointment             Electronically signed by:Duglas RODRÍGUEZ RN  Jul 14 2017 12:03PM EST Author

## 2018-03-09 ENCOUNTER — TELEPHONE (OUTPATIENT)
Dept: GASTROENTEROLOGY | Facility: CLINIC | Age: 48
End: 2018-03-09

## 2018-03-09 PROBLEM — R10.9 ABDOMINAL PAIN: Status: ACTIVE | Noted: 2018-03-09

## 2018-03-09 PROBLEM — R63.4 RECENT WEIGHT LOSS: Status: ACTIVE | Noted: 2018-03-09

## 2018-03-09 NOTE — TELEPHONE ENCOUNTER
----- Message from Natasha Fowler MD sent at 3/8/2018  8:00 AM EST -----  Please schedule the patient for the endoscopy and colonoscopy  She has been cleared by ENT  Please schedule her soon

## 2018-03-28 ENCOUNTER — ANESTHESIA EVENT (OUTPATIENT)
Dept: PERIOP | Facility: AMBULARY SURGERY CENTER | Age: 48
End: 2018-03-28
Payer: COMMERCIAL

## 2018-04-09 ENCOUNTER — TELEPHONE (OUTPATIENT)
Dept: GASTROENTEROLOGY | Facility: CLINIC | Age: 48
End: 2018-04-09

## 2018-04-09 NOTE — TELEPHONE ENCOUNTER
Spoke to pt answered all questions she had reg moviprep asked the pt to call the office if she had any other questions

## 2018-04-09 NOTE — TELEPHONE ENCOUNTER
DR GONZALEZ'S PT    Pt called with questions to her prep/ medications for tomorrow's procedure   Please assist thank you

## 2018-04-10 ENCOUNTER — ANESTHESIA (OUTPATIENT)
Dept: PERIOP | Facility: AMBULARY SURGERY CENTER | Age: 48
End: 2018-04-10
Payer: COMMERCIAL

## 2018-04-10 ENCOUNTER — HOSPITAL ENCOUNTER (OUTPATIENT)
Facility: AMBULARY SURGERY CENTER | Age: 48
Setting detail: OUTPATIENT SURGERY
Discharge: HOME | End: 2018-04-10
Attending: INTERNAL MEDICINE | Admitting: INTERNAL MEDICINE
Payer: COMMERCIAL

## 2018-04-10 VITALS
BODY MASS INDEX: 26.52 KG/M2 | OXYGEN SATURATION: 100 % | DIASTOLIC BLOOD PRESSURE: 91 MMHG | TEMPERATURE: 97.3 F | SYSTOLIC BLOOD PRESSURE: 163 MMHG | HEART RATE: 83 BPM | RESPIRATION RATE: 18 BRPM | HEIGHT: 66 IN | WEIGHT: 165 LBS

## 2018-04-10 DIAGNOSIS — R10.9 ABDOMINAL PAIN, UNSPECIFIED ABDOMINAL LOCATION: ICD-10-CM

## 2018-04-10 DIAGNOSIS — R10.84 GENERALIZED ABDOMINAL PAIN: Primary | ICD-10-CM

## 2018-04-10 DIAGNOSIS — K52.9 CHRONIC DIARRHEA: Primary | ICD-10-CM

## 2018-04-10 DIAGNOSIS — R63.4 RECENT WEIGHT LOSS: ICD-10-CM

## 2018-04-10 LAB — EXT PREGNANCY TEST URINE: NEGATIVE

## 2018-04-10 PROCEDURE — 43239 EGD BIOPSY SINGLE/MULTIPLE: CPT | Performed by: INTERNAL MEDICINE

## 2018-04-10 PROCEDURE — 88305 TISSUE EXAM BY PATHOLOGIST: CPT | Performed by: PATHOLOGY

## 2018-04-10 PROCEDURE — 88342 IMHCHEM/IMCYTCHM 1ST ANTB: CPT | Performed by: PATHOLOGY

## 2018-04-10 PROCEDURE — 45380 COLONOSCOPY AND BIOPSY: CPT | Performed by: INTERNAL MEDICINE

## 2018-04-10 PROCEDURE — 81025 URINE PREGNANCY TEST: CPT | Performed by: ANESTHESIOLOGY

## 2018-04-10 PROCEDURE — 45385 COLONOSCOPY W/LESION REMOVAL: CPT | Performed by: INTERNAL MEDICINE

## 2018-04-10 RX ORDER — TRAMADOL HYDROCHLORIDE 50 MG/1
50 TABLET ORAL EVERY 8 HOURS PRN
Qty: 10 TABLET | Refills: 0 | Status: SHIPPED | OUTPATIENT
Start: 2018-04-10 | End: 2018-04-13

## 2018-04-10 RX ORDER — GLYCOPYRROLATE 0.2 MG/ML
INJECTION INTRAMUSCULAR; INTRAVENOUS AS NEEDED
Status: DISCONTINUED | OUTPATIENT
Start: 2018-04-10 | End: 2018-04-10 | Stop reason: SURG

## 2018-04-10 RX ORDER — CLONAZEPAM 1 MG/1
TABLET ORAL
COMMUNITY
Start: 2018-04-04 | End: 2018-05-20

## 2018-04-10 RX ORDER — ACETAMINOPHEN 325 MG/1
650 TABLET ORAL EVERY 6 HOURS PRN
COMMUNITY
End: 2018-05-20

## 2018-04-10 RX ORDER — PROPOFOL 10 MG/ML
INJECTION, EMULSION INTRAVENOUS AS NEEDED
Status: DISCONTINUED | OUTPATIENT
Start: 2018-04-10 | End: 2018-04-10 | Stop reason: SURG

## 2018-04-10 RX ORDER — PROPOFOL 10 MG/ML
INJECTION, EMULSION INTRAVENOUS CONTINUOUS PRN
Status: DISCONTINUED | OUTPATIENT
Start: 2018-04-10 | End: 2018-04-10 | Stop reason: SURG

## 2018-04-10 RX ORDER — SODIUM CHLORIDE 9 MG/ML
INJECTION, SOLUTION INTRAVENOUS CONTINUOUS PRN
Status: DISCONTINUED | OUTPATIENT
Start: 2018-04-10 | End: 2018-04-10 | Stop reason: SURG

## 2018-04-10 RX ADMIN — PROPOFOL 80 MCG/KG/MIN: 10 INJECTION, EMULSION INTRAVENOUS at 09:33

## 2018-04-10 RX ADMIN — SODIUM CHLORIDE: 0.9 INJECTION, SOLUTION INTRAVENOUS at 09:58

## 2018-04-10 RX ADMIN — PROPOFOL 100 MG: 10 INJECTION, EMULSION INTRAVENOUS at 09:34

## 2018-04-10 RX ADMIN — SODIUM CHLORIDE: 0.9 INJECTION, SOLUTION INTRAVENOUS at 09:25

## 2018-04-10 RX ADMIN — PROPOFOL 50 MG: 10 INJECTION, EMULSION INTRAVENOUS at 09:41

## 2018-04-10 RX ADMIN — PROPOFOL 100 MG: 10 INJECTION, EMULSION INTRAVENOUS at 09:33

## 2018-04-10 RX ADMIN — PROPOFOL 50 MG: 10 INJECTION, EMULSION INTRAVENOUS at 09:38

## 2018-04-10 RX ADMIN — GLYCOPYRROLATE 0.1 MG: 0.2 INJECTION, SOLUTION INTRAMUSCULAR; INTRAVENOUS at 09:28

## 2018-04-10 NOTE — ANESTHESIA PREPROCEDURE EVALUATION
Review of Systems/Medical History  Patient summary reviewed  Chart reviewed  No history of anesthetic complications     Cardiovascular  Hyperlipidemia, Hypertension controlled,    Pulmonary  Smoker cigarette smoker  , COPD moderate- medication dependent ,        GI/Hepatic      Comment: IBS     Negative  ROS        Endo/Other  Negative endo/other ROS      GYN  Negative gynecology ROS          Hematology  Negative hematology ROS      Musculoskeletal  Negative musculoskeletal ROS        Neurology  Negative neurology ROS      Psychology   Depression , bipolar disorder,              Physical Exam    Airway    Mallampati score: II  TM Distance: >3 FB  Neck ROM: full     Dental   No notable dental hx     Cardiovascular  Rhythm: regular, Rate: normal, Cardiovascular exam normal    Pulmonary  Pulmonary exam normal Breath sounds clear to auscultation,     Other Findings        Anesthesia Plan  ASA Score- 2     Anesthesia Type- IV sedation with anesthesia with ASA Monitors  Additional Monitors:   Airway Plan:         Plan Factors-    Induction- intravenous  Postoperative Plan-     Informed Consent- Anesthetic plan and risks discussed with patient  I personally reviewed this patient with the CRNA  Discussed and agreed on the Anesthesia Plan with the CRNA  Jada Grajeda Recent labs personally reviewed:  Lab Results   Component Value Date    WBC 7 52 12/15/2017    HGB 14 2 12/15/2017     (H) 12/15/2017     Lab Results   Component Value Date     (L) 12/15/2017    K 4 2 12/15/2017    BUN 7 12/15/2017    CREATININE 0 72 12/15/2017    GLUCOSE 64 (L) 12/15/2017     I, Keshia Lorenz MD, have personally seen and evaluated the patient prior to anesthetic care  I have reviewed the pre-anesthetic record, and other medical records if appropriate to the anesthetic care  If a CRNA is involved in the case, I have reviewed the CRNA assessment, if present, and agree   Risks/benefits and alternatives discussed with patient including possible PONV, sore throat, and possibility of rare anesthetic and surgical emergencies

## 2018-04-10 NOTE — OP NOTE
COLONOSCOPY    PROCEDURE: Colonoscopy/ Biopsy  Polypectomy (Cold Snare)    INDICATIONS: Diarrhea    POST-OP DIAGNOSIS: See the impression below    SEDATION: Monitored anesthesia care, check anesthesia records    PHYSICAL EXAM:    /82   Pulse 97   Temp (!) 97 3 °F (36 3 °C) (Temporal)   Resp 18   Ht 5' 6" (1 676 m)   Wt 74 8 kg (165 lb)   LMP 04/03/2018 (Approximate) Comment: for 2 days  SpO2 98%   BMI 26 63 kg/m²   Body mass index is 26 63 kg/m²  General: NAD  Heart: S1 & S2 normal, RRR  Lungs: CTA, No rales or rhonchi  Abdomen: Soft, nontender, nondistended, good bowel sounds    CONSENT:  Informed consent was obtained for the procedure, including sedation after explaining the risks and benefits of the procedure  Risks including but not limited to bleeding, perforation, infection, aspiration were discussed in detail  Also explained about less than 100%$ sensitivity with the exam and other alternatives  PREPARATION:   EKG tracing, pulse oximetry, blood pressure were monitored throughout the procedure  Patient was identified by myself both verbally and by visual inspection of ID band  DESCRIPTION:   Patient was placed in the left lateral decubitus position and was sedated with the above medication  Digital rectal examination was performed  The colonoscope was introduced in to the anal canal and advanced up to cecum, which was identified by the appendiceal orifice and IC valve  A careful inspection was made as the colonoscope was withdrawn, including a retroflexed view of the rectum; findings and interventions are described below  Appropriate photodocumentation was obtained  The quality of the colonic preparation was fair  FINDINGS:    One small polyp was seen in the descending colon  This was removed by cold snare polypectomy  Otherwise the entire colon appeared to be normal  Random biopsies were done throughout the colon  IMPRESSIONS:      1   Small descending colon polyp  Removed by cold snare polypectomy  2   Otherwise normal colon  Random biopsies were done  RECOMMENDATIONS:    1  Follow up with results of the biopsies with Dr Shiar Duarte in 2 weeks  2   Repeat colonoscopy in 3-5 years  3   Continue Questran  4   Stool studies for infectious workup  COMPLICATIONS:  None; patient tolerated the procedure well      DISPOSITION: PACU           CONDITION: Stable

## 2018-04-10 NOTE — OP NOTE
ESOPHAGOGASTRODUODENOSCOPY    PROCEDURE: EGD/ Biopsy    INDICATIONS: Diarrhea    POST-OP DIAGNOSIS: See the impression below    SEDATION: Monitored anesthesia care, check anesthesia records    PHYSICAL EXAM:    There were no vitals filed for this visit  There is no height or weight on file to calculate BMI  General: NAD  Heart: S1 & S2 normal, RRR  Lungs: CTA, No rales or rhonchi  Abdomen: Soft, nontender, nondistended, good bowel sounds    CONSENT:  Informed consent was obtained for the procedure, including sedation after explaining the risks and benefits of the procedure  Risks including but not limited to bleeding, perforation, infection, aspiration were discussed in detail  Also explained about less than 100% sensitivity with the exam and other alternatives  PREPARATION:   EKG tracing, pulse oximetry, blood pressure were monitored throughout the procedure  Patient was identified by myself both verbally and by visual inspection of ID band  DESCRIPTION:   Patient was placed in the left lateral decubitus position and was sedated with the above medication  The gastroscope was introduced in to the oropharynx and the esophagus was intubated under direct visualization  Scope was passed down the esophagus up to 2nd part of the duodenum  A careful inspection was made as the gastroscope was withdrawn, including a retroflexed view of the stomach; findings and interventions are described below  FINDINGS:    #1  Esophagus and GEJ-the proximal, mid and distal esophagus were normal   There was a possible subcentimeter area of Gonzales's esophagus seen at the GE junction  Biopsies were obtained  The GE junction was at 40 cm from the incisors  #2  Stomach-mild nodular mucosa seen of the cardia and the gastric body  Biopsies were obtained to rule out H pylori  Otherwise normal stomach  #3  Duodenum-normal duodenal bulb and 2nd portion  Random biopsies were done to rule out celiac disease  IMPRESSIONS:      1  Possible Gonzales's esophagus short-segment  Biopsies were done  2   Normal stomach with mild nodular mucosa  Biopsies done to rule out H pylori  3   Normal duodenum  Biopsies were to rule out celiac disease  RECOMMENDATIONS:     1  Follow up with results of the biopsies with Dr Carter Clark in 2 weeks  COMPLICATIONS:  None; patient tolerated the procedure well            DISPOSITION: PACU           CONDITION: Stable

## 2018-04-10 NOTE — ANESTHESIA POSTPROCEDURE EVALUATION
Post-Op Assessment Note      CV Status:  Stable    Mental Status:  Lethargic    Hydration Status:  Stable    PONV Controlled:  None    Airway Patency:  Patent    Post Op Vitals Reviewed: Yes          Staff: Anesthesiologist, CRNA           BP   144/86   Temp      Pulse 100   Resp   14   SpO2 100

## 2018-04-10 NOTE — H&P
History and Physical -  [facility]  Mireya Gonsalves 50 y o  female MRN: [id number]    HPI: Mireya Gonsalves is a 50y o  year old female who presents with h/o chronic diarrhea  EGD and colon to rule out celiac and microscopic colitis  Review of Systems    Historical Information   Past Medical History:   Diagnosis Date    Bipolar disorder ([facility] )     COPD (chronic obstructive pulmonary disease) ([place] [address] )     Depression     Hyperlipidemia     Hypertension     Psychiatric disorder      Past Surgical History:   Procedure Laterality Date    PANCREAS SURGERY      "pseudocysts" per patient's  Rickipantera Mcdanielschuck     Social History   History   Alcohol Use    Yes     History   Drug Use    Types: Marijuana     History   Smoking Status    Current Every Day Smoker    Packs/day: 1 00    Types: Cigarettes   Smokeless Tobacco    Current User     No family history on file  Meds/Allergies     No prescriptions prior to admission  Allergies   Allergen Reactions    Sulfa Antibiotics        Objective     There were no vitals taken for this visit  PHYSICAL EXAM    Gen: NAD  CV: RRR  CHEST: Clear  ABD: soft, NT/ND  EXT: no edema  Neuro: AAO      ASSESSMENT/PLAN:  This is a 50y o  year old female here for EGD and colonoscopy to rule out celiac and microscopic colitis for chronic diarrhea       PLAN:   Procedure: EGD and colonoscopy

## 2018-04-16 ENCOUNTER — TRANSCRIBE ORDERS (OUTPATIENT)
Dept: LAB | Facility: CLINIC | Age: 48
End: 2018-04-16

## 2018-04-16 ENCOUNTER — APPOINTMENT (OUTPATIENT)
Dept: LAB | Facility: CLINIC | Age: 48
End: 2018-04-16
Payer: COMMERCIAL

## 2018-04-16 DIAGNOSIS — R13.10 DYSPHAGIA, UNSPECIFIED TYPE: ICD-10-CM

## 2018-04-16 DIAGNOSIS — R49.0 DYSPHONIA: ICD-10-CM

## 2018-04-16 DIAGNOSIS — R13.10 DYSPHAGIA, UNSPECIFIED TYPE: Primary | ICD-10-CM

## 2018-04-16 DIAGNOSIS — J04.0 LARYNGITIS: ICD-10-CM

## 2018-04-16 DIAGNOSIS — K52.9 CHRONIC DIARRHEA: ICD-10-CM

## 2018-04-16 DIAGNOSIS — K52.9 INFLAMMATORY BOWEL DISEASE: ICD-10-CM

## 2018-04-16 LAB
25(OH)D3 SERPL-MCNC: 10.6 NG/ML (ref 30–100)
ANION GAP SERPL CALCULATED.3IONS-SCNC: 7 MMOL/L (ref 4–13)
BUN SERPL-MCNC: 11 MG/DL (ref 5–25)
CALCIUM SERPL-MCNC: 9.5 MG/DL
CHLORIDE SERPL-SCNC: 101 MMOL/L (ref 100–108)
CO2 SERPL-SCNC: 28 MMOL/L (ref 21–32)
CREAT SERPL-MCNC: 0.8 MG/DL (ref 0.6–1.3)
GFR SERPL CREATININE-BSD FRML MDRD: 87 ML/MIN/1.73SQ M
GLUCOSE SERPL-MCNC: 99 MG/DL (ref 65–140)
POTASSIUM SERPL-SCNC: 4.1 MMOL/L (ref 3.5–5.3)
SODIUM SERPL-SCNC: 136 MMOL/L (ref 136–145)
T4 FREE SERPL-MCNC: 0.78 NG/DL (ref 0.76–1.46)
TSH SERPL DL<=0.05 MIU/L-ACNC: 1.74 UIU/ML (ref 0.36–3.74)

## 2018-04-16 PROCEDURE — 84439 ASSAY OF FREE THYROXINE: CPT

## 2018-04-16 PROCEDURE — 80048 BASIC METABOLIC PNL TOTAL CA: CPT

## 2018-04-16 PROCEDURE — 83516 IMMUNOASSAY NONANTIBODY: CPT

## 2018-04-16 PROCEDURE — 86738 MYCOPLASMA ANTIBODY: CPT

## 2018-04-16 PROCEDURE — 86255 FLUORESCENT ANTIBODY SCREEN: CPT

## 2018-04-16 PROCEDURE — 36415 COLL VENOUS BLD VENIPUNCTURE: CPT

## 2018-04-16 PROCEDURE — 83519 RIA NONANTIBODY: CPT

## 2018-04-16 PROCEDURE — 86038 ANTINUCLEAR ANTIBODIES: CPT

## 2018-04-16 PROCEDURE — 86618 LYME DISEASE ANTIBODY: CPT

## 2018-04-16 PROCEDURE — 82306 VITAMIN D 25 HYDROXY: CPT

## 2018-04-16 PROCEDURE — 82784 ASSAY IGA/IGD/IGG/IGM EACH: CPT

## 2018-04-16 PROCEDURE — 84443 ASSAY THYROID STIM HORMONE: CPT

## 2018-04-16 PROCEDURE — 86430 RHEUMATOID FACTOR TEST QUAL: CPT

## 2018-04-17 LAB
B BURGDOR IGG SER IA-ACNC: 0.05
B BURGDOR IGM SER IA-ACNC: 0.32
ENDOMYSIUM IGA SER QL: NEGATIVE
GLIADIN PEPTIDE IGA SER-ACNC: 3 UNITS (ref 0–19)
GLIADIN PEPTIDE IGG SER-ACNC: 2 UNITS (ref 0–19)
IGA SERPL-MCNC: 243 MG/DL (ref 87–352)
RHEUMATOID FACT SER QL LA: NEGATIVE
TTG IGA SER-ACNC: <2 U/ML (ref 0–3)
TTG IGG SER-ACNC: <2 U/ML (ref 0–5)

## 2018-04-18 ENCOUNTER — APPOINTMENT (OUTPATIENT)
Dept: LAB | Facility: CLINIC | Age: 48
End: 2018-04-18
Payer: COMMERCIAL

## 2018-04-18 LAB
C-ANCA TITR SER IF: NORMAL TITER
M PNEUMO IGG SER IA-ACNC: <100 U/ML (ref 0–99)
M PNEUMO IGM SER IA-ACNC: <770 U/ML (ref 0–769)
MYELOPEROXIDASE AB SER IA-ACNC: <9 U/ML (ref 0–9)
P-ANCA ATYPICAL TITR SER IF: NORMAL TITER
P-ANCA TITR SER IF: NORMAL TITER
PROTEINASE3 AB SER IA-ACNC: <3.5 U/ML (ref 0–3.5)
RYE IGE QN: NEGATIVE

## 2018-04-18 PROCEDURE — 87177 OVA AND PARASITES SMEARS: CPT

## 2018-04-18 PROCEDURE — 87209 SMEAR COMPLEX STAIN: CPT

## 2018-04-18 PROCEDURE — 87505 NFCT AGENT DETECTION GI: CPT

## 2018-04-19 ENCOUNTER — TELEPHONE (OUTPATIENT)
Dept: GASTROENTEROLOGY | Facility: CLINIC | Age: 48
End: 2018-04-19

## 2018-04-19 LAB
CAMPYLOBACTER DNA SPEC NAA+PROBE: NORMAL
SALMONELLA DNA SPEC QL NAA+PROBE: NORMAL
SHIGA TOXIN STX GENE SPEC NAA+PROBE: NORMAL
SHIGELLA DNA SPEC QL NAA+PROBE: NORMAL

## 2018-04-19 NOTE — TELEPHONE ENCOUNTER
DR GONZALEZ'S PT    Lab outreach called to notify pt turned in form stool for cdiff  Order was cancelled

## 2018-04-20 ENCOUNTER — TELEPHONE (OUTPATIENT)
Dept: GASTROENTEROLOGY | Facility: CLINIC | Age: 48
End: 2018-04-20

## 2018-04-20 LAB — ACHR MOD AB/ACHR TOTAL SFR SER: <12 % (ref 0–20)

## 2018-04-20 NOTE — TELEPHONE ENCOUNTER
Discussed results from EGD/ colon  Suggested stopping Questran as she felt it wasn't working  Suggested imodium 1 pill daily & then more or less as needed  Still complaining of hoarseness despite Prilosec in AM & zantac in PM    Anything else you would want as she is losing her insurance at the end of the month?   Suellen Kayser

## 2018-04-21 LAB — O+P STL CONC: NORMAL

## 2018-04-24 NOTE — TELEPHONE ENCOUNTER
Spoke to patient, she will be picking up the samples tomorrow some time before 11:30 at the [facility]

## 2018-05-08 ENCOUNTER — TELEPHONE (OUTPATIENT)
Dept: GASTROENTEROLOGY | Facility: AMBULARY SURGERY CENTER | Age: 48
End: 2018-05-08

## 2018-05-20 ENCOUNTER — HOSPITAL ENCOUNTER (INPATIENT)
Facility: HOSPITAL | Age: 48
LOS: 3 days | DRG: 816 | End: 2018-05-23
Attending: EMERGENCY MEDICINE | Admitting: INTERNAL MEDICINE
Payer: COMMERCIAL

## 2018-05-20 DIAGNOSIS — F32.A DEPRESSION, UNSPECIFIED DEPRESSION TYPE: ICD-10-CM

## 2018-05-20 DIAGNOSIS — F31.9 BIPOLAR AFFECTIVE DISORDER, REMISSION STATUS UNSPECIFIED (HCC): ICD-10-CM

## 2018-05-20 DIAGNOSIS — T14.91XA SUICIDE ATTEMPT (HCC): ICD-10-CM

## 2018-05-20 DIAGNOSIS — T14.91XA SUICIDAL BEHAVIOR WITH ATTEMPTED SELF-INJURY (HCC): ICD-10-CM

## 2018-05-20 DIAGNOSIS — T56.892A: Primary | ICD-10-CM

## 2018-05-20 PROBLEM — R45.89 SUICIDAL BEHAVIOR: Status: ACTIVE | Noted: 2018-05-20

## 2018-05-20 PROBLEM — T56.891A LITHIUM OVERDOSE: Status: ACTIVE | Noted: 2018-05-20

## 2018-05-20 LAB
ALBUMIN SERPL BCP-MCNC: 4.2 G/DL (ref 3.5–5)
ALP SERPL-CCNC: 74 U/L (ref 46–116)
ALT SERPL W P-5'-P-CCNC: 20 U/L (ref 12–78)
AMPHETAMINES SERPL QL SCN: NEGATIVE
ANION GAP SERPL CALCULATED.3IONS-SCNC: 6 MMOL/L (ref 4–13)
APAP SERPL-MCNC: <2 UG/ML (ref 10–30)
APTT PPP: 26 SECONDS (ref 24–36)
AST SERPL W P-5'-P-CCNC: 25 U/L (ref 5–45)
BARBITURATES UR QL: NEGATIVE
BASOPHILS # BLD AUTO: 0.04 THOUSANDS/ΜL (ref 0–0.1)
BASOPHILS NFR BLD AUTO: 0 % (ref 0–1)
BENZODIAZ UR QL: NEGATIVE
BILIRUB SERPL-MCNC: 0.32 MG/DL (ref 0.2–1)
BUN SERPL-MCNC: 5 MG/DL (ref 5–25)
CALCIUM SERPL-MCNC: 10.2 MG/DL (ref 8.3–10.1)
CHLORIDE SERPL-SCNC: 97 MMOL/L (ref 100–108)
CO2 SERPL-SCNC: 31 MMOL/L (ref 21–32)
COCAINE UR QL: NEGATIVE
CREAT SERPL-MCNC: 0.98 MG/DL (ref 0.6–1.3)
EOSINOPHIL # BLD AUTO: 0.07 THOUSAND/ΜL (ref 0–0.61)
EOSINOPHIL NFR BLD AUTO: 1 % (ref 0–6)
ERYTHROCYTE [DISTWIDTH] IN BLOOD BY AUTOMATED COUNT: 13.8 % (ref 11.6–15.1)
ETHANOL SERPL-MCNC: 64 MG/DL (ref 0–3)
GFR SERPL CREATININE-BSD FRML MDRD: 68 ML/MIN/1.73SQ M
GLUCOSE SERPL-MCNC: 79 MG/DL (ref 65–140)
GLUCOSE SERPL-MCNC: 79 MG/DL (ref 65–140)
HCT VFR BLD AUTO: 46 % (ref 34.8–46.1)
HGB BLD-MCNC: 15.1 G/DL (ref 11.5–15.4)
INR PPP: 1.01 (ref 0.86–1.17)
LITHIUM SERPL-SCNC: 1.8 MMOL/L (ref 0.5–1)
LITHIUM SERPL-SCNC: 2.3 MMOL/L (ref 0.5–1)
LYMPHOCYTES # BLD AUTO: 2.14 THOUSANDS/ΜL (ref 0.6–4.47)
LYMPHOCYTES NFR BLD AUTO: 22 % (ref 14–44)
MAGNESIUM SERPL-MCNC: 2.4 MG/DL (ref 1.6–2.6)
MCH RBC QN AUTO: 36 PG (ref 26.8–34.3)
MCHC RBC AUTO-ENTMCNC: 32.8 G/DL (ref 31.4–37.4)
MCV RBC AUTO: 110 FL (ref 82–98)
METHADONE UR QL: NEGATIVE
MONOCYTES # BLD AUTO: 0.5 THOUSAND/ΜL (ref 0.17–1.22)
MONOCYTES NFR BLD AUTO: 5 % (ref 4–12)
NEUTROPHILS # BLD AUTO: 7.17 THOUSANDS/ΜL (ref 1.85–7.62)
NEUTS SEG NFR BLD AUTO: 72 % (ref 43–75)
NRBC BLD AUTO-RTO: 0 /100 WBCS
OPIATES UR QL SCN: NEGATIVE
PCP UR QL: NEGATIVE
PLATELET # BLD AUTO: 295 THOUSANDS/UL (ref 149–390)
PLATELET # BLD AUTO: 331 THOUSANDS/UL (ref 149–390)
PMV BLD AUTO: 10 FL (ref 8.9–12.7)
PMV BLD AUTO: 10.2 FL (ref 8.9–12.7)
POTASSIUM SERPL-SCNC: 4.8 MMOL/L (ref 3.5–5.3)
PROT SERPL-MCNC: 8 G/DL (ref 6.4–8.2)
PROTHROMBIN TIME: 13.4 SECONDS (ref 11.8–14.2)
RBC # BLD AUTO: 4.19 MILLION/UL (ref 3.81–5.12)
SALICYLATES SERPL-MCNC: 4.4 MG/DL (ref 3–20)
SODIUM SERPL-SCNC: 134 MMOL/L (ref 136–145)
THC UR QL: NEGATIVE
TSH SERPL DL<=0.05 MIU/L-ACNC: 1.3 UIU/ML (ref 0.36–3.74)
WBC # BLD AUTO: 9.92 THOUSAND/UL (ref 4.31–10.16)

## 2018-05-20 PROCEDURE — 85610 PROTHROMBIN TIME: CPT | Performed by: EMERGENCY MEDICINE

## 2018-05-20 PROCEDURE — 80329 ANALGESICS NON-OPIOID 1 OR 2: CPT | Performed by: EMERGENCY MEDICINE

## 2018-05-20 PROCEDURE — 80320 DRUG SCREEN QUANTALCOHOLS: CPT | Performed by: EMERGENCY MEDICINE

## 2018-05-20 PROCEDURE — 80178 ASSAY OF LITHIUM: CPT | Performed by: EMERGENCY MEDICINE

## 2018-05-20 PROCEDURE — 36415 COLL VENOUS BLD VENIPUNCTURE: CPT | Performed by: EMERGENCY MEDICINE

## 2018-05-20 PROCEDURE — 83735 ASSAY OF MAGNESIUM: CPT | Performed by: EMERGENCY MEDICINE

## 2018-05-20 PROCEDURE — 93005 ELECTROCARDIOGRAM TRACING: CPT

## 2018-05-20 PROCEDURE — 96360 HYDRATION IV INFUSION INIT: CPT

## 2018-05-20 PROCEDURE — 99223 1ST HOSP IP/OBS HIGH 75: CPT | Performed by: HOSPITALIST

## 2018-05-20 PROCEDURE — 99285 EMERGENCY DEPT VISIT HI MDM: CPT

## 2018-05-20 PROCEDURE — 80053 COMPREHEN METABOLIC PANEL: CPT | Performed by: EMERGENCY MEDICINE

## 2018-05-20 PROCEDURE — 85049 AUTOMATED PLATELET COUNT: CPT | Performed by: PHYSICIAN ASSISTANT

## 2018-05-20 PROCEDURE — 82948 REAGENT STRIP/BLOOD GLUCOSE: CPT

## 2018-05-20 PROCEDURE — 80307 DRUG TEST PRSMV CHEM ANLYZR: CPT | Performed by: EMERGENCY MEDICINE

## 2018-05-20 PROCEDURE — 85730 THROMBOPLASTIN TIME PARTIAL: CPT | Performed by: EMERGENCY MEDICINE

## 2018-05-20 PROCEDURE — 85025 COMPLETE CBC W/AUTO DIFF WBC: CPT | Performed by: EMERGENCY MEDICINE

## 2018-05-20 PROCEDURE — 84443 ASSAY THYROID STIM HORMONE: CPT | Performed by: EMERGENCY MEDICINE

## 2018-05-20 PROCEDURE — 80178 ASSAY OF LITHIUM: CPT | Performed by: PHYSICIAN ASSISTANT

## 2018-05-20 RX ORDER — PANTOPRAZOLE SODIUM 40 MG/1
40 TABLET, DELAYED RELEASE ORAL
Status: DISCONTINUED | OUTPATIENT
Start: 2018-05-21 | End: 2018-05-23 | Stop reason: HOSPADM

## 2018-05-20 RX ORDER — MORPHINE SULFATE 2 MG/ML
1 INJECTION, SOLUTION INTRAMUSCULAR; INTRAVENOUS EVERY 4 HOURS PRN
Status: DISCONTINUED | OUTPATIENT
Start: 2018-05-20 | End: 2018-05-23

## 2018-05-20 RX ORDER — BUDESONIDE AND FORMOTEROL FUMARATE DIHYDRATE 160; 4.5 UG/1; UG/1
2 AEROSOL RESPIRATORY (INHALATION) 2 TIMES DAILY
Status: DISCONTINUED | OUTPATIENT
Start: 2018-05-20 | End: 2018-05-23 | Stop reason: HOSPADM

## 2018-05-20 RX ORDER — CARBAMAZEPINE 100 MG/1
100 TABLET, EXTENDED RELEASE ORAL 2 TIMES DAILY
COMMUNITY
End: 2018-05-30 | Stop reason: HOSPADM

## 2018-05-20 RX ORDER — HEPARIN SODIUM 5000 [USP'U]/ML
5000 INJECTION, SOLUTION INTRAVENOUS; SUBCUTANEOUS EVERY 8 HOURS SCHEDULED
Status: DISCONTINUED | OUTPATIENT
Start: 2018-05-20 | End: 2018-05-23 | Stop reason: HOSPADM

## 2018-05-20 RX ORDER — ONDANSETRON 2 MG/ML
4 INJECTION INTRAMUSCULAR; INTRAVENOUS EVERY 6 HOURS PRN
Status: DISCONTINUED | OUTPATIENT
Start: 2018-05-20 | End: 2018-05-23 | Stop reason: HOSPADM

## 2018-05-20 RX ORDER — QUETIAPINE FUMARATE 300 MG/1
300 TABLET, FILM COATED ORAL
COMMUNITY
End: 2018-05-30 | Stop reason: HOSPADM

## 2018-05-20 RX ORDER — ALPRAZOLAM 0.25 MG/1
0.25 TABLET ORAL
COMMUNITY
End: 2018-05-30 | Stop reason: HOSPADM

## 2018-05-20 RX ORDER — SODIUM CHLORIDE 9 MG/ML
75 INJECTION, SOLUTION INTRAVENOUS CONTINUOUS
Status: DISCONTINUED | OUTPATIENT
Start: 2018-05-20 | End: 2018-05-22

## 2018-05-20 RX ORDER — NICOTINE 21 MG/24HR
1 PATCH, TRANSDERMAL 24 HOURS TRANSDERMAL DAILY
Status: DISCONTINUED | OUTPATIENT
Start: 2018-05-21 | End: 2018-05-20

## 2018-05-20 RX ORDER — LITHIUM CARBONATE 300 MG
TABLET ORAL
COMMUNITY
End: 2018-05-20

## 2018-05-20 RX ORDER — ACETAMINOPHEN 325 MG/1
650 TABLET ORAL EVERY 6 HOURS PRN
Status: DISCONTINUED | OUTPATIENT
Start: 2018-05-20 | End: 2018-05-23 | Stop reason: HOSPADM

## 2018-05-20 RX ORDER — SODIUM CHLORIDE 9 MG/ML
125 INJECTION, SOLUTION INTRAVENOUS CONTINUOUS
Status: DISCONTINUED | OUTPATIENT
Start: 2018-05-20 | End: 2018-05-20

## 2018-05-20 RX ORDER — NICOTINE 21 MG/24HR
1 PATCH, TRANSDERMAL 24 HOURS TRANSDERMAL DAILY
Status: DISCONTINUED | OUTPATIENT
Start: 2018-05-20 | End: 2018-05-23

## 2018-05-20 RX ORDER — LORAZEPAM 2 MG/ML
1 INJECTION INTRAMUSCULAR 4 TIMES DAILY PRN
Status: DISCONTINUED | OUTPATIENT
Start: 2018-05-20 | End: 2018-05-23

## 2018-05-20 RX ADMIN — SODIUM CHLORIDE 1000 ML: 0.9 INJECTION, SOLUTION INTRAVENOUS at 12:33

## 2018-05-20 RX ADMIN — BUDESONIDE AND FORMOTEROL FUMARATE DIHYDRATE 2 PUFF: 160; 4.5 AEROSOL RESPIRATORY (INHALATION) at 17:22

## 2018-05-20 RX ADMIN — NICOTINE 1 PATCH: 14 PATCH TRANSDERMAL at 19:06

## 2018-05-20 RX ADMIN — SODIUM CHLORIDE 1000 ML: 0.9 INJECTION, SOLUTION INTRAVENOUS at 14:05

## 2018-05-20 RX ADMIN — HEPARIN SODIUM 5000 UNITS: 5000 INJECTION, SOLUTION INTRAVENOUS; SUBCUTANEOUS at 21:12

## 2018-05-20 RX ADMIN — HEPARIN SODIUM 5000 UNITS: 5000 INJECTION, SOLUTION INTRAVENOUS; SUBCUTANEOUS at 16:41

## 2018-05-20 RX ADMIN — LORAZEPAM 1 MG: 2 INJECTION INTRAMUSCULAR; INTRAVENOUS at 19:06

## 2018-05-20 RX ADMIN — MORPHINE SULFATE 1 MG: 2 INJECTION, SOLUTION INTRAMUSCULAR; INTRAVENOUS at 22:22

## 2018-05-20 RX ADMIN — MORPHINE SULFATE 1 MG: 2 INJECTION, SOLUTION INTRAMUSCULAR; INTRAVENOUS at 16:41

## 2018-05-20 RX ADMIN — SODIUM CHLORIDE 150 ML/HR: 0.9 INJECTION, SOLUTION INTRAVENOUS at 23:01

## 2018-05-20 RX ADMIN — LORAZEPAM 1 MG: 2 INJECTION INTRAMUSCULAR; INTRAVENOUS at 23:00

## 2018-05-20 RX ADMIN — SODIUM CHLORIDE 150 ML/HR: 0.9 INJECTION, SOLUTION INTRAVENOUS at 16:42

## 2018-05-20 NOTE — H&P
History and Physical - [facility] Medicine    Patient Information: Denis Donnelly 50 y o  female MRN: [id number]  Unit/Bed#: ED 25 Encounter: [id number]  Admitting Physician: Virginia Escobedo PA-C  PCP: Alistair Burgess DO  Date of Admission:  05/20/18    Assessment/Plan:    Hospital Problem List:     Principal Problem:    Lithium overdose  Active Problems:    Hypertension    Hyperlipidemia    Depression    COPD (chronic obstructive pulmonary disease) ([facility] [address] )    Bipolar disorder ([facility] )    Suicidal behavior      Primary Problem(s):  · Acute lithium overdose  · Intentional overdose with suicide attempt  · Was on lithium about a yr ago but never discarded pills once taken off  · Took entire bottle last night- unsure of the mg in each pill  · Initially tachycardic, tachypneic and diaphoretic upon presentation in ED  · Lithium level noted to be 2 3  · Will recheck lithium level later tonight and in a m  · Continue with IV fluid hydration at 125 ml/hr  · Monitor temperatures and renal function closely  · Will place the step-down unit for closer monitoring or any signs of neuroleptic malignant syndrome    Additional Problems:   · Psych:  Bipolar depression-Suicidal:  In the setting of recent lithium overdose  Patient notes she still would like to kill herself bc she is disgusted with her life- multiple stressors including family dynamics with daughter and  as well as her current financial situation  Also has lost health insurance  Continue to monitor on one-to-one, Psychiatry consulted  Outpatient psychiatric medication list verified which consists of carbamazepine 100 mg twice daily, Cymbalta 90 mg daily, Seroquel 300 mg daily  Will temporarily hold in the setting of acute lithium overdose for the next 24 hours and defer to psychiatry in terms of resuming medication once stable  · Alcohol use:  Presenting with alcohol level of 63  Patient is drinking multiple bloody Nilda's last night    Denies any history of seizure or alcohol withdrawal   1 mg Ativan as needed for acute withdrawal symptoms  · Essential hypertension:  Home regimen of lisinopril 10 mg daily  BP on the low side  105/57, 99/58  Suspect improvement on IV fluids  · GERD:  Continue PPI    · COPD:  Maintained on Symbicort maintenance therapy and albuterol as needed  Stable here although patient does have coarse breath sounds bilaterally  No evidence of acute exacerbation  · Tobacco abuse:  Smokes daily  Counseled on cessation  Provide nicotine patch  VTE Prophylaxis: Heparin  / sequential compression device   Code Status:  Full code  Anticipated Length of Stay:  Patient will be admitted on an Inpatient basis with an anticipated length of stay of  greater than 2 midnights  Justification for Hospital Stay:  Intentional lithium overdose with need for further monitoring, suicidal with need for further psychiatric evaluation    Chief Complaint:   Intentional overdose    History of Present Illness:    Gardenia Harrison is a 50 y o  female with a PMH of bipolar depression, essential hypertension, GERD, asthma  She presents after an intentional drug overdose  Last night patient reports she was drinking bloody Nilda's  She has become extremely depressed to to her family situation that she decided to overdose on an old bottle of lithium  Patient was on this medication about a year ago but never discarded the remaining pills  Notes taking full bottle worth of lithium with intent to die  Patient notes she has very many stressors in her life:  Does not get along with daughter, recently lost health insurance, problems with  with possible divorce in the near future  Previously patient follows with a psychiatrist and therapist however has not seen therapist in over 4 months and was politely dismissed from Psychiatry past just given she missed 3 appointments  Upon my examination, patient is still suicidal and does not want to live  Reports feeling extremely depressed and frustrated with her life   No history of previous suicidal attempts  Complaining of muscle rigidity and pain in her arms and legs  The chills, shakes, diaphoresis is improving since arrival to ED  Denies any chest pain, chest pressure, shortness of breath, nausea, vomiting   was present at bedside  Patient unaware of her medications  Called patient's pharmacy- [facility] on [address]  Medication list verified patient is taking carbamazepine 100 mg twice daily, Cymbalta 90 mg daily, Seroquel 300 mg before bed, lisinopril 10 mg daily, Prilosec 40 mg daily  Smokes one pack per day  Occasional alcohol consumption  No other drug use  Review of Systems:    General:   No Fever, + Chills / sweats   EENT:   No ear pain, facial swelling;   Skin:   No rashes, color changes  Respiratory:     No shortness of breath, cough, wheezing, stridor  Cardiovascular:     No chest pain, palpitations  Gastrointestinal:    No nausea, vomiting, diarrhea; No abdominal pain  Musculoskeletal:     No arthralgias,+ myalgias, swelling  Neurologic:   No dizziness, numbness, weakness  No speech difficulties  Psych:   No agitation,    Otherwise, All other twelve-point review of systems normal      Past Medical and Surgical History:     Past Medical History:   Diagnosis Date    [facility] [address] )     [facility]     Depression     Hyperlipidemia     Hypertension     Psychiatric disorder        Past Surgical History:   Procedure Laterality Date     SECTION      PANCREAS SURGERY      "pseudocysts" per patient's  Ricki Olson    [place] ESOPHAGOGASTRODUODENOSCOPY TRANSORAL DIAGNOSTIC N/A 4/10/2018    Procedure: EGD AND COLONOSCOPY;  Surgeon: Angel Gould MD;  [place] [facility];   Service: Gastroenterology       Meds/Allergies:    Current Facility-Administered Medications   Medication Dose Route Frequency Provider Last Rate Last Dose     EMS REPLENISHMENT MED   Does not apply Once Triage Protocol Emergency, MD        sodium chloride 0 9 % bolus 1,000 mL  1,000 mL Intravenous Once Tree Plummer, DO 1,000 mL/hr at 05/20/18 1405 1,000 mL at 05/20/18 1405     Current Outpatient Prescriptions   Medication Sig Dispense Refill    budesonide-formoterol (SYMBICORT) 160-4 5 mcg/act inhaler Inhale 2 puffs 2 (two) times a day      DULoxetine (CYMBALTA) 30 mg delayed release capsule Take 30 mg by mouth daily      DULoxetine (CYMBALTA) 60 mg delayed release capsule Take 30 mg by mouth daily at bedtime      fluticasone (FLONASE) 50 mcg/act nasal spray 2 sprays into each nostril daily Not taking recently       lisinopril (ZESTRIL) 10 mg tablet Take 10 mg by mouth daily        lithium 300 MG tablet Take by mouth      omeprazole (PriLOSEC) 40 MG capsule Take 40 mg by mouth daily        lisinopril (ZESTRIL) 10 mg tablet Take by mouth      omeprazole (PriLOSEC) 40 MG capsule Take 1 capsule by mouth Daily      ondansetron (ZOFRAN) 4 mg tablet Take 1 tablet by mouth every 8 (eight) hours as needed         Allergies   Allergen Reactions    Sulfa Antibiotics        Allergies: Allergies   Allergen Reactions    Sulfa Antibiotics        Social History:     Marital Status: /Civil Union     Substance Use History:   History   Alcohol Use    Yes     Comment: vodka and beer     History   Smoking Status    Current Every Day Smoker    Packs/day: 1 00    Types: Cigarettes   Smokeless Tobacco    Current User     History   Drug Use    Types: Marijuana       Family History:    non-contributory    Physical Exam:     Vitals:   Blood Pressure: 105/57 (05/20/18 1430)  Pulse: 102 (05/20/18 1430)  Temperature: 98 3 °F (36 8 °C) (05/20/18 1226)  Temp Source: Oral (05/20/18 1226)  Respirations: 18 (05/20/18 1415)  SpO2: 97 % (05/20/18 1430)    Physical Exam   Constitutional: She is oriented to person, place, and time   She appears well-developed and well-nourished  No distress  HENT:   Head: Normocephalic and atraumatic  Eyes: Conjunctivae are normal    Cardiovascular: Normal rate, regular rhythm and normal heart sounds  Pulmonary/Chest: Effort normal  No respiratory distress  She has no wheezes  She has no rales  She exhibits no tenderness  Course breath sounds   Abdominal: Soft  Bowel sounds are normal  She exhibits no distension and no mass  There is no tenderness  There is no rebound and no guarding  Neurological: She is alert and oriented to person, place, and time  Skin: Skin is warm  She is diaphoretic  Psychiatric: She has a normal mood and affect  Her behavior is normal    Nursing note and vitals reviewed  Additional Data:     Lab Results: I have personally reviewed pertinent reports  Results from last 7 days  Lab Units 18  1233   WBC Thousand/uL 9 92   HEMOGLOBIN g/dL 15 1   HEMATOCRIT % 46 0   PLATELETS Thousands/uL 331   NEUTROS PCT % 72   LYMPHS PCT % 22   MONOS PCT % 5   EOS PCT % 1       Results from last 7 days  Lab Units 18  1233   SODIUM mmol/L 134*   POTASSIUM mmol/L 4 8   CHLORIDE mmol/L 97*   CO2 mmol/L 31   BUN mg/dL 5   CREATININE mg/dL 0 98   CALCIUM mg/dL 10 2*   TOTAL PROTEIN g/dL 8 0   BILIRUBIN TOTAL mg/dL 0 32   ALK PHOS U/L 74   ALT U/L 20   AST U/L 25   GLUCOSE RANDOM mg/dL 79       Results from last 7 days  Lab Units 18  1233   INR  1 01       Imaging: I have personally reviewed pertinent reports  No results found  EKG, Pathology, and Other Studies Reviewed on Admission:   · EK with artifact    [employer] Records Reviewed: Yes     Total Time for Visit, including Counseling / Coordination of Care: 45 minutes  Greater than 50% of this total time spent on direct patient counseling and coordination of care  ** Please Note: This note has been constructed using a voice recognition system   **

## 2018-05-20 NOTE — ED PROVIDER NOTES
History  Chief Complaint   Patient presents with    Overdose - Intentional     intentionally took an overdose of lithium, combined with alcohol  43-year-old female presents for evaluation after suicide attempt  The patient took 20-30 lithium tablets last evening around 7:00 p m  an intentional overdose  The patient presents now complaining of generalized body aches and muscle stiffness  The patient states that her pain is worse with movement and nothing has made it better  The patient denies other co ingestions  Patient was given 1 mg of Ativan the EMS without improvement in symptoms  History provided by:  Patient  Overdose - Intentional       Prior to Admission Medications   Prescriptions Last Dose Informant Patient Reported? Taking?    ALPRAZolam (XANAX) 0 25 mg tablet   Yes Yes   Sig: Take 0 25 mg by mouth daily at bedtime as needed for anxiety   DULoxetine (CYMBALTA) 30 mg delayed release capsule   Yes Yes   Sig: Take 30 mg by mouth daily   DULoxetine (CYMBALTA) 60 mg delayed release capsule   Yes Yes   Sig: Take 30 mg by mouth daily at bedtime   QUEtiapine (SEROquel) 300 mg tablet   Yes Yes   Sig: Take 300 mg by mouth daily at bedtime   budesonide-formoterol (SYMBICORT) 160-4 5 mcg/act inhaler   Yes Yes   Sig: Inhale 2 puffs 2 (two) times a day   carBAMazepine (TEGretol XR) 100 mg 12 hr tablet   Yes Yes   Sig: Take 100 mg by mouth 2 (two) times a day   fluticasone (FLONASE) 50 mcg/act nasal spray   Yes Yes   Si sprays into each nostril daily Not taking recently    lisinopril (ZESTRIL) 10 mg tablet   Yes Yes   Sig: Take 10 mg by mouth daily     omeprazole (PriLOSEC) 40 MG capsule   Yes No   Sig: Take 1 capsule by mouth Daily      Facility-Administered Medications: None       Past Medical History:   Diagnosis Date    [facility]     COPD [facility]chronic obstructive pulmonary disease) ([facility]     Depression     Hyperlipidemia     Hypertension     Psychiatric disorder        Past Surgical History:   Procedure Laterality Date     SECTION      PANCREAS SURGERY      "pseudocysts" per patient's  Ricki Desai ESOPHAGOGASTRODUODENOSCOPY TRANSORAL DIAGNOSTIC N/A 4/10/2018    Procedure: EGD AND COLONOSCOPY;  Surgeon: Austen Mixon MD;  [facility]; Service: Gastroenterology       History reviewed  No pertinent family history  I have reviewed and agree with the history as documented  Social History   Substance Use Topics    Smoking status: Current Every Day Smoker     Packs/day: 1 00     Types: Cigarettes    Smokeless tobacco: Current User    Alcohol use Yes      Comment: vodka and beer        Review of Systems   Constitutional: Negative for chills and fever  Musculoskeletal: Positive for myalgias  Psychiatric/Behavioral: Positive for suicidal ideas  The patient is nervous/anxious  All other systems reviewed and are negative  Physical Exam  Physical Exam   Constitutional: She is oriented to person, place, and time  She appears well-developed and well-nourished  She appears distressed  HENT:   Head: Normocephalic and atraumatic  Right Ear: External ear normal    Left Ear: External ear normal    Eyes: Conjunctivae and EOM are normal  Pupils are equal, round, and reactive to light  No scleral icterus  Neck: Normal range of motion  Cardiovascular: Regular rhythm and normal heart sounds  Tachycardia present  Pulmonary/Chest: Effort normal and breath sounds normal  No respiratory distress  Abdominal: Soft  Bowel sounds are normal  There is no tenderness  There is no rebound and no guarding  Musculoskeletal: Normal range of motion  She exhibits no edema  Neurological: She is alert and oriented to person, place, and time  She exhibits abnormal muscle tone ( Muscle stiffness)  Skin: Skin is warm and dry  No rash noted  Psychiatric: She exhibits a depressed mood  She expresses suicidal ideation   She expresses suicidal plans (Lithium overdose)  Nursing note and vitals reviewed  Vital Signs  ED Triage Vitals   Temperature Pulse Respirations Blood Pressure SpO2   05/20/18 1226 05/20/18 1219 05/20/18 1219 05/20/18 1219 05/20/18 1219   98 3 °F (36 8 °C) (!) 130 (!) 24 135/85 92 %      Temp Source Heart Rate Source Patient Position - Orthostatic VS BP Location FiO2 (%)   05/20/18 1226 05/20/18 1219 05/20/18 1219 05/20/18 1219 --   Oral Monitor Lying Right arm       Pain Score       --                  Vitals:    05/20/18 1300 05/20/18 1315 05/20/18 1415 05/20/18 1430   BP:  113/57 99/58 105/57   Pulse: (!) 116 (!) 114 102 102   Patient Position - Orthostatic VS:  Lying         Visual Acuity      ED Medications  Medications    EMS REPLENISHMENT MED (not administered)   sodium chloride 0 9 % bolus 1,000 mL (0 mL Intravenous Stopped 5/20/18 1330)   sodium chloride 0 9 % bolus 1,000 mL (1,000 mL Intravenous New Bag 5/20/18 1405)       Diagnostic Studies  Results Reviewed     Procedure Component Value Units Date/Time    TSH [[id number]]  (Normal) Collected:  05/20/18 1233    Lab Status:  Final result Specimen:  Blood from Arm, Right Updated:  05/20/18 1312     TSH 3RD GENERATON 1 297 uIU/mL     Narrative:         Patients undergoing fluorescein dye angiography may retain small amounts of fluorescein in the body for 48-72 hours post procedure  Samples containing fluorescein can produce falsely depressed TSH values  If the patient had this procedure,a specimen should be resubmitted post fluorescein clearance            The recommended reference ranges for TSH during pregnancy are as follows:  First trimester 0 1 to 2 5 uIU/mL  Second trimester  0 2 to 3 0 uIU/mL  Third trimester 0 3 to 3 0 uIU/m      Magnesium [[id number]]  (Normal) Collected:  05/20/18 1233    Lab Status:  Final result Specimen:  Blood from Arm, Right Updated:  05/20/18 1312     Magnesium 2 4 mg/dL     Lithium level [[id number]]  (Abnormal) Collected:  05/20/18 1233    Lab Status:  Final result Specimen:  Blood from Arm, Right Updated:  05/20/18 1312     Lithium Lvl 2 3 (HH) mmol/L     Acetaminophen level [[id number]]  (Abnormal) Collected:  05/20/18 1233    Lab Status:  Final result Specimen:  Blood from Arm, Right Updated:  05/20/18 1309     Acetaminophen Level <2 (L) ug/mL     Salicylate level [[id number]]  (Normal) Collected:  05/20/18 1233    Lab Status:  Final result Specimen:  Blood from Arm, Right Updated:  62/21/33 0950     Salicylate Lvl 4 4 mg/dL     Comprehensive metabolic panel [[id number]]  (Abnormal) Collected:  05/20/18 1233    Lab Status:  Final result Specimen:  Blood from Arm, Right Updated:  05/20/18 1304     Sodium 134 (L) mmol/L      Potassium 4 8 mmol/L      Chloride 97 (L) mmol/L      CO2 31 mmol/L      Anion Gap 6 mmol/L      BUN 5 mg/dL      Creatinine 0 98 mg/dL      Glucose 79 mg/dL      Calcium 10 2 (H) mg/dL      AST 25 U/L      ALT 20 U/L      Alkaline Phosphatase 74 U/L      Total Protein 8 0 g/dL      Albumin 4 2 g/dL      Total Bilirubin 0 32 mg/dL      eGFR 68 ml/min/1 73sq m     Narrative:         National Kidney Disease Education Program recommendations are as follows:  GFR calculation is accurate only with a steady state creatinine  Chronic Kidney disease less than 60 ml/min/1 73 sq  meters  Kidney failure less than 15 ml/min/1 73 sq  meters      Ethanol [[id number]]  (Abnormal) Collected:  05/20/18 1233    Lab Status:  Final result Specimen:  Blood from Arm, Right Updated:  05/20/18 1253     Ethanol Lvl 64 (H) mg/dL     Protime-INR [[id number]]  (Normal) Collected:  05/20/18 1233    Lab Status:  Final result Specimen:  Blood from Arm, Right Updated:  05/20/18 1250     Protime 13 4 seconds      INR 1 01    APTT [[id number]]  (Normal) Collected:  05/20/18 1233    Lab Status:  Final result Specimen:  Blood from Arm, Right Updated:  05/20/18 1250     PTT 26 seconds     Fingerstick Glucose (POCT) [[id number]]  (Normal) Collected:  05/20/18 1241    Lab Status:  Final result Updated:  05/20/18 1243     POC Glucose 79 mg/dl     CBC and differential [[id number]]  (Abnormal) Collected:  05/20/18 1233    Lab Status:  Final result Specimen:  Blood from Arm, Right Updated:  05/20/18 1241     WBC 9 92 Thousand/uL      RBC 4 19 Million/uL      Hemoglobin 15 1 g/dL      Hematocrit 46 0 %       (H) fL      MCH 36 0 (H) pg      MCHC 32 8 g/dL      RDW 13 8 %      MPV 10 2 fL      Platelets 572 Thousands/uL      nRBC 0 /100 WBCs      Neutrophils Relative 72 %      Lymphocytes Relative 22 %      Monocytes Relative 5 %      Eosinophils Relative 1 %      Basophils Relative 0 %      Neutrophils Absolute 7 17 Thousands/µL      Lymphocytes Absolute 2 14 Thousands/µL      Monocytes Absolute 0 50 Thousand/µL      Eosinophils Absolute 0 07 Thousand/µL      Basophils Absolute 0 04 Thousands/µL     Rapid drug screen, urine [[id number]]     Lab Status:  No result Specimen:  Urine                  No orders to display              Procedures  ECG 12 Lead Documentation  Date/Time: 5/20/2018 12:26 PM  Performed by: Stanley Rich  Authorized by: Gilford Heap B     Indications / Diagnosis:  Lithium overdose  ECG reviewed by me, the ED Provider: yes    Patient location:  ED  Previous ECG:     Previous ECG:  Unavailable  Interpretation:     Interpretation: normal    Quality:     Tracing quality:  Limited by artifact  Rate:     ECG rate:  129    ECG rate assessment: normal    Rhythm:     Rhythm: sinus rhythm    Ectopy:     Ectopy: none    QRS:     QRS axis:  Normal    QRS intervals:  Normal  Conduction:     Conduction: normal    ST segments:     ST segments:  Non-specific  T waves:     T waves: normal    Other findings:     Other findings: VIC    CriticalCare Time  Performed by: Stanley Rich  Authorized by: Gilford Heap B     Critical care provider statement:     Critical care time (minutes):  35    Critical care time was exclusive of:  Separately billable procedures and treating other patients and teaching time    Critical care was necessary to treat or prevent imminent or life-threatening deterioration of the following conditions:  Toxidrome    Critical care was time spent personally by me on the following activities:  Blood draw for specimens, obtaining history from patient or surrogate, development of treatment plan with patient or surrogate, discussions with consultants, evaluation of patient's response to treatment, examination of patient, ordering and performing treatments and interventions, ordering and review of laboratory studies, ordering and review of radiographic studies, re-evaluation of patient's condition, review of old charts and interpretation of cardiac output measurements    I assumed direction of critical care for this patient from another provider in my specialty: no             Phone Contacts  ED Phone Contact    ED Course  ED Course as of May 20 1604   Sun May 20, 2018   1330 Call placed Tristan [facility], they will have the  call me back  56 D/w Dr Yareli Fuentes (poison control tox [phone]) who agreed with current management as well as checking CPK due to muscle stiffness and pain  Advised admission and serial Lithium levels Q4-6H until trending down  Observe for signs of NMS with hyperthermia, muscle stiffness or mental status changes that would require emergent dialysis  1407 I discussed the case with the hospitalist  We reviewed the HPI, pertinent PMH, ED course and workup  Hospitalist agreed with plan and will admit the patient to the hospital                                 MDM  Number of Diagnoses or Management Options  Lithium overdose, intentional self-harm, initial encounter [facility]): new and requires workup  Suicide attempt [facility]): new and requires workup  Diagnosis management comments: 47-year-old female presents for evaluation of intentional lithium overdose  Isn't in acute on chronic overdose with an elevated lithium level    The patient does not have neurologic sequelae at this time  There is muscle stiffness without objective rigidity  The plan is for IV hydration, observation, serial lithium levels  Serial temperature is and neuro checks to assess for the potential development of neuroleptics malignant syndrome    The patient will be admitted to the step-down unit on the medicine service    The patient will then need an inpatient psych consult for the intentional overdose       Amount and/or Complexity of Data Reviewed  Clinical lab tests: ordered and reviewed  Tests in the medicine section of CPT®: reviewed and ordered  Review and summarize past medical records: yes  Discuss the patient with other providers: yes (Toxicology and internal medicine)  Independent visualization of images, tracings, or specimens: yes      CritCare Time    Disposition  Final diagnoses:   Lithium overdose, intentional self-harm, initial encounter ([place] [address] )   Suicide attempt [facility])     Time reflects when diagnosis was documented in both MDM as applicable and the Disposition within this note     Time User Action Codes Description Comment    5/20/2018  2:14 PM Ronn Kanner Add [I19 508P] Lithium overdose, intentional self-harm, initial encounter ([place] [address] )     5/20/2018  2:15 PM Nichol Zelaya Suicide attempt ([place] [address] )     5/20/2018  3:25 PM Piger, [address] [F31 9] Bipolar affective disorder, remission status unspecified ([place] [address] )     5/20/2018  3:25 PM Piger, Ac Rolo Modify [F31 9] Bipolar affective disorder, remission status unspecified ([place] [address] )     5/20/2018  3:25 PM Piger, Ac Rolo Add Dearl Cage Suicidal behavior with attempted self-injury ([facility] )     5/20/2018  3:25 PM Sulusia Seek Modify Dearl Cage Suicidal behavior with attempted self-injury ([place] [address] )     5/20/2018  3:25 PM Piger, Ac Rolo Add [E78 5] Hyperlipidemia, unspecified hyperlipidemia type     5/20/2018  3:25 PM Piger, Ac Rolo Modify [E78 5] Hyperlipidemia, unspecified hyperlipidemia type     5/20/2018  3:25 PM Piger, Ac Rolo Remove [E78 5] Hyperlipidemia, unspecified hyperlipidemia type     5/20/2018  3:25 PM Yousuf Montes Add [F32 9] Depression, unspecified depression type     5/20/2018  3:25 PM Yousuf Montes Modify [F32 9] Depression, unspecified depression type       ED Disposition     ED Disposition Condition Comment    Admit  Case was discussed with Dr Melo Manley and the patient's admission status was agreed to be Admission Status: inpatient status to the service of Dr Melo Manley          Follow-up Information    None         Patient's Medications   Discharge Prescriptions    No medications on file     No discharge procedures on file      ED Provider  Electronically Signed by           Yancy De Oliveira DO  05/20/18 3203

## 2018-05-20 NOTE — ED NOTES
Patient gives consent for  to visit her in room  Patient's spouse at bedside       Soraya Delaney RN  05/20/18 9495

## 2018-05-20 NOTE — ED NOTES
Patient reports taking 20-30 Lithium, does not know the mg of the pills        Polo Dalal, CHRISTIANO  05/20/18 2819

## 2018-05-20 NOTE — ED NOTES
Patient requesting that she have no visitors at this time  Family aware and leaves number in patietn chart with registration if needed for call       Nadene Curling, RN  05/20/18 1300

## 2018-05-21 LAB
ANION GAP SERPL CALCULATED.3IONS-SCNC: 3 MMOL/L (ref 4–13)
ATRIAL RATE: 131 BPM
BUN SERPL-MCNC: 4 MG/DL (ref 5–25)
CALCIUM SERPL-MCNC: 8.5 MG/DL (ref 8.3–10.1)
CHLORIDE SERPL-SCNC: 105 MMOL/L (ref 100–108)
CO2 SERPL-SCNC: 31 MMOL/L (ref 21–32)
CREAT SERPL-MCNC: 0.7 MG/DL (ref 0.6–1.3)
ERYTHROCYTE [DISTWIDTH] IN BLOOD BY AUTOMATED COUNT: 13.8 % (ref 11.6–15.1)
GFR SERPL CREATININE-BSD FRML MDRD: 102 ML/MIN/1.73SQ M
GLUCOSE SERPL-MCNC: 83 MG/DL (ref 65–140)
HCT VFR BLD AUTO: 40.1 % (ref 34.8–46.1)
HGB BLD-MCNC: 12.6 G/DL (ref 11.5–15.4)
LITHIUM SERPL-SCNC: 1.3 MMOL/L (ref 0.5–1)
MCH RBC QN AUTO: 35.4 PG (ref 26.8–34.3)
MCHC RBC AUTO-ENTMCNC: 31.4 G/DL (ref 31.4–37.4)
MCV RBC AUTO: 113 FL (ref 82–98)
P AXIS: 82 DEGREES
PLATELET # BLD AUTO: 245 THOUSANDS/UL (ref 149–390)
PMV BLD AUTO: 10.1 FL (ref 8.9–12.7)
POTASSIUM SERPL-SCNC: 3.8 MMOL/L (ref 3.5–5.3)
PR INTERVAL: 138 MS
QRS AXIS: 84 DEGREES
QRSD INTERVAL: 74 MS
QT INTERVAL: 296 MS
QTC INTERVAL: 433 MS
RBC # BLD AUTO: 3.56 MILLION/UL (ref 3.81–5.12)
SODIUM SERPL-SCNC: 139 MMOL/L (ref 136–145)
T WAVE AXIS: 68 DEGREES
VENTRICULAR RATE: 129 BPM
WBC # BLD AUTO: 7.6 THOUSAND/UL (ref 4.31–10.16)

## 2018-05-21 PROCEDURE — G8979 MOBILITY GOAL STATUS: HCPCS

## 2018-05-21 PROCEDURE — 97163 PT EVAL HIGH COMPLEX 45 MIN: CPT

## 2018-05-21 PROCEDURE — G8989 SELF CARE D/C STATUS: HCPCS

## 2018-05-21 PROCEDURE — 93010 ELECTROCARDIOGRAM REPORT: CPT | Performed by: INTERNAL MEDICINE

## 2018-05-21 PROCEDURE — 99232 SBSQ HOSP IP/OBS MODERATE 35: CPT | Performed by: INTERNAL MEDICINE

## 2018-05-21 PROCEDURE — 85027 COMPLETE CBC AUTOMATED: CPT | Performed by: PHYSICIAN ASSISTANT

## 2018-05-21 PROCEDURE — 99243 OFF/OP CNSLTJ NEW/EST LOW 30: CPT | Performed by: PSYCHIATRY & NEUROLOGY

## 2018-05-21 PROCEDURE — G8987 SELF CARE CURRENT STATUS: HCPCS

## 2018-05-21 PROCEDURE — G8978 MOBILITY CURRENT STATUS: HCPCS

## 2018-05-21 PROCEDURE — 97116 GAIT TRAINING THERAPY: CPT

## 2018-05-21 PROCEDURE — 80178 ASSAY OF LITHIUM: CPT | Performed by: PHYSICIAN ASSISTANT

## 2018-05-21 PROCEDURE — 80048 BASIC METABOLIC PNL TOTAL CA: CPT | Performed by: PHYSICIAN ASSISTANT

## 2018-05-21 PROCEDURE — 97166 OT EVAL MOD COMPLEX 45 MIN: CPT

## 2018-05-21 PROCEDURE — G8988 SELF CARE GOAL STATUS: HCPCS

## 2018-05-21 RX ORDER — DULOXETIN HYDROCHLORIDE 60 MG/1
60 CAPSULE, DELAYED RELEASE ORAL DAILY
Status: DISCONTINUED | OUTPATIENT
Start: 2018-05-21 | End: 2018-05-23 | Stop reason: HOSPADM

## 2018-05-21 RX ORDER — IBUPROFEN 400 MG/1
400 TABLET ORAL EVERY 6 HOURS PRN
Status: DISCONTINUED | OUTPATIENT
Start: 2018-05-21 | End: 2018-05-23 | Stop reason: HOSPADM

## 2018-05-21 RX ORDER — CARBAMAZEPINE 100 MG/1
100 TABLET, EXTENDED RELEASE ORAL 2 TIMES DAILY
Status: DISCONTINUED | OUTPATIENT
Start: 2018-05-21 | End: 2018-05-23 | Stop reason: HOSPADM

## 2018-05-21 RX ADMIN — LORAZEPAM 1 MG: 2 INJECTION INTRAMUSCULAR; INTRAVENOUS at 17:27

## 2018-05-21 RX ADMIN — MORPHINE SULFATE 1 MG: 2 INJECTION, SOLUTION INTRAMUSCULAR; INTRAVENOUS at 02:35

## 2018-05-21 RX ADMIN — BUDESONIDE AND FORMOTEROL FUMARATE DIHYDRATE 2 PUFF: 160; 4.5 AEROSOL RESPIRATORY (INHALATION) at 08:06

## 2018-05-21 RX ADMIN — MORPHINE SULFATE 1 MG: 2 INJECTION, SOLUTION INTRAMUSCULAR; INTRAVENOUS at 06:11

## 2018-05-21 RX ADMIN — IBUPROFEN 400 MG: 400 TABLET ORAL at 06:23

## 2018-05-21 RX ADMIN — ONDANSETRON 4 MG: 2 INJECTION INTRAMUSCULAR; INTRAVENOUS at 16:17

## 2018-05-21 RX ADMIN — MORPHINE SULFATE 1 MG: 2 INJECTION, SOLUTION INTRAMUSCULAR; INTRAVENOUS at 21:33

## 2018-05-21 RX ADMIN — BUDESONIDE AND FORMOTEROL FUMARATE DIHYDRATE 2 PUFF: 160; 4.5 AEROSOL RESPIRATORY (INHALATION) at 17:52

## 2018-05-21 RX ADMIN — HEPARIN SODIUM 5000 UNITS: 5000 INJECTION, SOLUTION INTRAVENOUS; SUBCUTANEOUS at 21:33

## 2018-05-21 RX ADMIN — MORPHINE SULFATE 1 MG: 2 INJECTION, SOLUTION INTRAMUSCULAR; INTRAVENOUS at 12:41

## 2018-05-21 RX ADMIN — IBUPROFEN 400 MG: 400 TABLET ORAL at 19:09

## 2018-05-21 RX ADMIN — LORAZEPAM 1 MG: 2 INJECTION INTRAMUSCULAR; INTRAVENOUS at 09:12

## 2018-05-21 RX ADMIN — CARBAMAZEPINE 100 MG: 100 TABLET, EXTENDED RELEASE ORAL at 13:08

## 2018-05-21 RX ADMIN — HEPARIN SODIUM 5000 UNITS: 5000 INJECTION, SOLUTION INTRAVENOUS; SUBCUTANEOUS at 06:12

## 2018-05-21 RX ADMIN — HEPARIN SODIUM 5000 UNITS: 5000 INJECTION, SOLUTION INTRAVENOUS; SUBCUTANEOUS at 13:12

## 2018-05-21 RX ADMIN — DULOXETINE 60 MG: 60 CAPSULE, DELAYED RELEASE ORAL at 12:41

## 2018-05-21 RX ADMIN — MORPHINE SULFATE 1 MG: 2 INJECTION, SOLUTION INTRAMUSCULAR; INTRAVENOUS at 17:26

## 2018-05-21 RX ADMIN — SODIUM CHLORIDE 150 ML/HR: 0.9 INJECTION, SOLUTION INTRAVENOUS at 05:57

## 2018-05-21 RX ADMIN — PANTOPRAZOLE SODIUM 40 MG: 40 TABLET, DELAYED RELEASE ORAL at 06:12

## 2018-05-21 RX ADMIN — NICOTINE 1 PATCH: 14 PATCH TRANSDERMAL at 19:14

## 2018-05-21 RX ADMIN — LORAZEPAM 1 MG: 2 INJECTION INTRAMUSCULAR; INTRAVENOUS at 21:37

## 2018-05-21 RX ADMIN — SODIUM CHLORIDE 75 ML/HR: 0.9 INJECTION, SOLUTION INTRAVENOUS at 12:53

## 2018-05-21 NOTE — CASE MANAGEMENT
Initial Clinical Review    Admission: Date/Time/Statement: 5/20/18 @ 1414     Orders Placed This Encounter   Procedures    Inpatient Admission (expected length of stay for this patient is greater than two midnights)     Standing Status:   Standing     Number of Occurrences:   1     Order Specific Question:   Admitting Physician     Answer:   Adriane Doss [[id number]]     Order Specific Question:   Level of Care     Answer:   Level 1 Stepdown [13]     Order Specific Question:   Estimated length of stay     Answer:   More than 2 Midnights     Order Specific Question:   Certification     Answer:   I certify that inpatient services are medically necessary for this patient for a duration of greater than two midnights  See H&P and MD Progress Notes for additional information about the patient's course of treatment  ED: Date/Time/Mode of Arrival:   ED Arrival Information     Expected Arrival Acuity Means of Arrival Escorted By Service Admission Type    - 5/20/2018 12:15 Urgent Ambulance [facility] Urgent    Arrival Complaint    Overdose          Chief Complaint:   Chief Complaint   Patient presents with    Overdose - Intentional     intentionally took an overdose of lithium, combined with alcohol  History of Illness:    Pati Cochran is a 50 y o  female with a PMH of bipolar depression, essential hypertension, GERD, asthma  She presents after an intentional drug overdose  Last night patient reports she was drinking bloody Nilda's  She has become extremely depressed to to her family situation that she decided to overdose on an old bottle of lithium  Patient was on this medication about a year ago but never discarded the remaining pills  Notes taking full bottle worth of lithium with intent to die  Patient notes she has very many stressors in her life:  Does not get along with daughter, recently lost health insurance, problems with  with possible divorce in the near future  Previously patient follows with a psychiatrist and therapist however has not seen therapist in over 4 months and was politely dismissed from Psychiatry past just given she missed 3 appointments       Upon my examination, patient is still suicidal and does not want to live  Reports feeling extremely depressed and frustrated with her life   No history of previous suicidal attempts  Complaining of muscle rigidity and pain in her arms and legs  The chills, shakes, diaphoresis is improving since arrival to ED  Denies any chest pain, chest pressure, shortness of breath, nausea, vomiting       was present at bedside  Patient unaware of her medications  Called patient's pharmacy- [facility] on [address]  Medication list verified patient is taking carbamazepine 100 mg twice daily, Cymbalta 90 mg daily, Seroquel 300 mg before bed, lisinopril 10 mg daily, Prilosec 40 mg daily  Smokes one pack per day  Occasional alcohol consumption    No other drug use        ED Vital Signs:   ED Triage Vitals   Temperature Pulse Respirations Blood Pressure SpO2   05/20/18 1226 05/20/18 1219 05/20/18 1219 05/20/18 1219 05/20/18 1219   98 3 °F (36 8 °C) (!) 130 (!) 24 135/85 92 %      Temp Source Heart Rate Source Patient Position - Orthostatic VS BP Location FiO2 (%)   05/20/18 1226 05/20/18 1219 05/20/18 1219 05/20/18 1219 --   Oral Monitor Lying Right arm       Pain Score       05/20/18 1612       6        Wt Readings from Last 1 Encounters:   04/10/18 74 8 kg (165 lb)       Vital Signs (abnormal):    above    Abnormal Labs/Diagnostic Test Results:   UDS   Neg  Lithium  lvl  2 3  NA   134  Chloride   97  BAL  64    ED Treatment:   Medication Administration from 05/20/2018 1215 to 05/20/2018 1605       Date/Time Order Dose Route Action Action by Comments     05/20/2018 1330 sodium chloride 0 9 % bolus 1,000 mL 0 mL Intravenous Stopped Virginia Torres RN      05/20/2018 1233 sodium chloride 0 9 % bolus 1,000 mL 1,000 mL Intravenous Vincentet 37 Adenike Torres RN      05/20/2018 1405 sodium chloride 0 9 % bolus 1,000 mL 1,000 mL Intravenous New Bag Miracle Cavazos RN           Past Medical/Surgical History: Active Ambulatory Problems     Diagnosis Date Noted    Chronic diarrhea 01/25/2018    Irritable bowel syndrome with diarrhea 01/25/2018    Recent weight loss 03/09/2018    Abdominal pain 03/09/2018    Psychiatric disorder      Resolved Ambulatory Problems     Diagnosis Date Noted    No Resolved Ambulatory Problems     Past Medical History:   Diagnosis Date    Bipolar disorder ([place] [address] )     COPD (chronic obstructive pulmonary disease) ([place] [address] )     Depression     Hyperlipidemia     Hypertension     Psychiatric disorder        Admitting Diagnosis: Overdose [T50 901A]  Suicide attempt ([place] [address] ) [T14 91XA]  Lithium overdose, intentional self-harm, initial encounter ([place] [address] ) [L70 767E]  Bipolar affective disorder, remission status unspecified ([place] [address] ) [F31 9]  Depression, unspecified depression type [F32 9]  Suicidal behavior with attempted self-injury ([place] [address] ) Dimitri Moss    Age/Sex: 50 y o  female    · Assessment/Plan:   Acute lithium overdose  ? Intentional overdose with suicide attempt  ? Was on lithium about a yr ago but never discarded pills once taken off    ? Took entire bottle last night- unsure of the mg in each pill  ? Initially tachycardic, tachypneic and diaphoretic upon presentation in ED  ? Lithium level noted to be 2 3   ? Will recheck lithium level later tonight and in a m   ? Continue with IV fluid hydration at 125 ml/hr  ? Monitor temperatures and renal function closely  ? Will place the step-down unit for closer monitoring or any signs of neuroleptic malignant syndrome     Additional Problems:   · Psych:  Bipolar depression-Suicidal:  In the setting of recent lithium overdose    Patient notes she still would like to kill herself bc she is disgusted with her life- multiple stressors including family dynamics with daughter and  as well as her current financial situation  Also has lost health insurance  Continue to monitor on one-to-one, Psychiatry consulted  Outpatient psychiatric medication list verified which consists of carbamazepine 100 mg twice daily, Cymbalta 90 mg daily, Seroquel 300 mg daily  Will temporarily hold in the setting of acute lithium overdose for the next 24 hours and defer to psychiatry in terms of resuming medication once stable      · Alcohol use:  Presenting with alcohol level of 63  Patient is drinking multiple bloody Nilda's last night  Denies any history of seizure or alcohol withdrawal   1 mg Ativan as needed for acute withdrawal symptoms      · Essential hypertension:  Home regimen of lisinopril 10 mg daily  BP on the low side  105/57, 99/58  Suspect improvement on IV fluids       GERD:  Continue PPI  · COPD:  Maintained on Symbicort maintenance therapy and albuterol as needed  Stable here although patient does have coarse breath sounds bilaterally  No evidence of acute exacerbation      · Tobacco abuse:  Smokes daily  Counseled on cessation  Provide nicotine patch         VTE Prophylaxis: Heparin  / sequential compression device   Code Status:  Full code  Anticipated Length of Stay:  Patient will be admitted on an Inpatient basis with an anticipated length of stay of  greater than 2 midnights   Justification for Hospital Stay:  Intentional lithium overdose with need for further monitoring, suicidal with need for further psychiatric evaluation    Admission Orders:    IP     5/20  @    1414  Scheduled Meds:   Current Facility-Administered Medications:  EMS replenish medication  Does not apply Once Triage Protocol Emergency, MD    acetaminophen 650 mg Oral Q6H PRN Nancy Conner PA-C    budesonide-formoterol 2 puff Inhalation BID Annetta Montes PA-C    heparin (porcine) 5,000 Units Subcutaneous Q8H [facility] Annetta Montes PA-C    ibuprofen 400 mg Oral Q6H PRN CARLITOS Mahajan LORazepam 1 mg Intravenous 4x Daily PRN Milly Velez PA-C    morphine injection 1 mg Intravenous Q4H PRN Milly Velez PA-C    nicotine 1 patch Transdermal Daily Annetta Montes PA-C    ondansetron 4 mg Intravenous Q6H PRN BETHANY Erwin-PAULINA    pantoprazole 40 mg Oral Early Morning BETHANY Erwin-PAULINA    sodium chloride 150 mL/hr Intravenous Continuous Yuridia Meraz MD Last Rate: 150 mL/hr (05/21/18 0557)     Continuous Infusions:   sodium chloride 150 mL/hr Last Rate: 150 mL/hr (05/21/18 0557)     PRN Meds: acetaminophen    ibuprofen    LORazepam    morphine injection    ondansetron     Reg diet  Suicide precautions  1:1  Observation  Cons  pschy  PT/OT  IV  Ativan PRN   (  x2  5/20)  IV  MSO4  PRN  (  x2  5/20 and  X 2  5/21  Thus far)    Thank you,  [facility] in the [facility] by Alonso Garcia for 2017  Network Utilization Review Department  Phone: [phone]; Fax [phone]  ATTENTION: The Network Utilization Review Department is now centralized for our 7 Facilities  Make a note that we have a new phone and fax numbers for our Department  Please call with any questions or concerns to [phone] and carefully follow the prompts so that you are directed to the right person  All voicemails are confidential  Fax any determinations, approvals, denials, and requests for initial or continue stay review clinical to [phone]  Due to HIGH CALL volume, it would be easier if you could please send faxed requests to expedite your requests and in part, help us provide discharge notifications faster

## 2018-05-21 NOTE — PROGRESS NOTES
[facility] Progress Note  Patient: Melanie De La Cruz 50 y o  female   MRN: [id number]  PCP: Megan Johnson DO  Unit/Bed#: ICU 12 Encounter: [id number]  Date Of Visit: 18    Assessment:    Principal Problem:    Lithium overdose  Active Problems:    Hypertension    Hyperlipidemia    Depression    COPD (chronic obstructive [facility] disease) ([facility] )    [facility] )    Suicidal behavior      Plan:    · Lithium trending down to 1 3 , can reduce IV fluids recheck in AM if below toxic can be med stable for d/c to psych follow , no present of malignant neuroleptic   · Depression/ Bipolar emains despondent and labile await psych will start back on Tegretol and Cymbalta  · COPD stable   · Hypertension hold meds        VTE Pharmacologic Prophylaxis:   Pharmacologic: Heparin  Mechanical VTE Prophylaxis in Place: Yes    Discussions with Specialists or Other Care Team Provider: *no    Time Spent for Care: 45 minutes  More than 50% of total time spent on counseling and coordination of care as described above  Subjective:   Remains tearful and hopeless from her stressors , though denies present wish to hurt herself she is quite labile     Objective:     Vitals:   Temp (24hrs), Av 9 °F (36 6 °C), Min:97 3 °F (36 3 °C), Max:98 4 °F (36 9 °C)    HR:  [] 86  Resp:  [11-33] 12  BP: ()/(47-97) 112/97  SpO2:  [92 %-98 %] 98 %  There is no height or weight on file to calculate BMI  Input and Output Summary (last 24 hours):        Intake/Output Summary (Last 24 hours) at 18 1159  Last data filed at 18 1100   Gross per 24 hour   Intake             4745 ml   Output             4705 ml   Net               40 ml       Physical Exam:     Physical Exam:   General appearance: alert, distracted, appears stated age and cooperative  Head: Normocephalic, without obvious abnormality, atraumatic  Lungs: clear to auscultation bilaterally  Heart: regular rate and rhythm  Abdomen: soft, non-tender; bowel sounds normal; no masses,  no organomegaly  Back: negative  Extremities: extremities normal, atraumatic, no cyanosis or edema  Neurologic: Grossly normal, labile mood       Additional Data:     Labs:      Results from last 7 days  Lab Units 05/21/18  0512  05/20/18  1233   WBC Thousand/uL 7 60  --  9 92   HEMOGLOBIN g/dL 12 6  --  15 1   HEMATOCRIT % 40 1  --  46 0   PLATELETS Thousands/uL 245  < > 331   NEUTROS PCT %  --   --  72   LYMPHS PCT %  --   --  22   MONOS PCT %  --   --  5   EOS PCT %  --   --  1   < > = values in this interval not displayed  Results from last 7 days  Lab Units 05/21/18  0512 05/20/18  1233   SODIUM mmol/L 139 134*   POTASSIUM mmol/L 3 8 4 8   CHLORIDE mmol/L 105 97*   CO2 mmol/L 31 31   BUN mg/dL 4* 5   CREATININE mg/dL 0 70 0 98   CALCIUM mg/dL 8 5 10 2*   TOTAL PROTEIN g/dL  --  8 0   BILIRUBIN TOTAL mg/dL  --  0 32   ALK PHOS U/L  --  74   ALT U/L  --  20   AST U/L  --  25   GLUCOSE RANDOM mg/dL 83 79       Results from last 7 days  Lab Units 05/20/18  1233   INR  1 01       * I Have Reviewed All Lab Data Listed Above  * Additional Pertinent Lab Tests Reviewed: [facility] Admission Reviewed    Imaging:  No results found  Imaging Reports Reviewed Today Include: nonwe  Imaging Personally Reviewed by Myself Includes:  *  No results found       Recent Cultures (last 7 days):           Last 24 Hours Medication List:     Current Facility-Administered Medications:  EMS replenish medication  Does not apply Once Triage Protocol Emergency, MD    acetaminophen 650 mg Oral Q6H PRN Annetta Montes PA-C    budesonide-formoterol 2 puff Inhalation BID Annetta Montes PA-C    carBAMazepine 100 mg Oral BID Michelet Sparrow MD    DULoxetine 60 mg Oral Daily Michelet Sparrow MD    heparin (porcine) 5,000 Units Subcutaneous Q8H [facility] long-term Annetta Montes PA-C    ibuprofen 400 mg Oral Q6H PRN CARLITOS Sung    LORazepam 1 mg Intravenous 4x Daily PRN Leland Antis, PA-C    morphine injection 1 mg Intravenous Q4H PRN Clotilde Slipper, PA-C    nicotine 1 patch Transdermal Daily BETHANY Erwin-PAULINA    ondansetron 4 mg Intravenous Q6H PRN Annetta Montes, PA-PAULINA    pantoprazole 40 mg Oral Early Morning Annetta Montes, PA-PAULINA    sodium chloride 75 mL/hr Intravenous Continuous Nat Eldridge MD Last Rate: 150 mL/hr (05/21/18 0557)        Today, Patient Was Seen By: Nat Eldridge MD    ** Please Note: Dragon 360 Dictation voice to text software may have been used in the creation of this document   **

## 2018-05-21 NOTE — PLAN OF CARE
Problem: PHYSICAL THERAPY ADULT  Goal: Performs mobility at highest level of function for planned discharge setting  See evaluation for individualized goals  Treatment/Interventions: Functional transfer training, LE strengthening/ROM, Therapeutic exercise, Endurance training, Patient/family training, Bed mobility, Gait training, Spoke to nursing          See flowsheet documentation for full assessment, interventions and recommendations  Outcome: Progressing  Prognosis: Good  Problem List: Decreased strength, Decreased endurance, Impaired balance, Decreased mobility, Decreased safety awareness, Pain, Impaired sensation  Assessment: Pt is 49 yo female admitted from home w/ daughter and  for lithium overdose  Pt presented to hospital for attempted suicide by lithium overdose  Pt is currently on 1:1  PTA pt (I) w/ all functional mobility, IADLS, and ADLS w/o need for DME, (+) stairs at home, (+) driving, (-) recent falls, and (-) employed  Pt has multiple recent stressors in life: strained relationship with  and daughter, recently lost healthy insurance, and overall "disgusted" with her life  Pt currently demonstrates mod (I) bed mobility, mod (I) transfers, 4/5 BLE MMT, and c/o L3-L4 dermatomal pain RLE  Pt would benefit from continued skilled PT for deficits in strength, mobility, gait, stairs, and endurance  With IP PT goals met home is most likely appropriate discharge recommendation  Barriers to Discharge: Inaccessible home environment  Barriers to Discharge Comments: 6+8 BARAK, 3+9 stairs to second floor  Recommendation: Home with family support     PT - OK to Discharge: Yes    See flowsheet documentation for full assessment

## 2018-05-21 NOTE — PHYSICAL THERAPY NOTE
PT EVALUATION  Time-In:0740  Time-Out:0800  Total Time:20 minutes    50 y o     [id number]    Overdose [T50 901A]  Suicide attempt ([place] [address] ) [T14 91XA]  Lithium overdose, intentional self-harm, initial encounter ([place] [address] ) [T56 892A]  Bipolar affective disorder, remission status unspecified ([place] [address] ) [F31 9]  Depression, unspecified depression type [F32 9]  Suicidal behavior with attempted self-injury ([place] [address] ) Nancy Johns    Past Medical History:   Diagnosis Date    Bipolar disorder ([place] [address] )     COPD (chronic obstructive pulmonary disease) ([place] [address] )     Depression     Hyperlipidemia     Hypertension     Psychiatric disorder          Past Surgical History:   Procedure Laterality Date     SECTION      PANCREAS SURGERY      "pseudocysts" per patient's  Ricki Olson    [place] ESOPHAGOGASTRODUODENOSCOPY TRANSORAL DIAGNOSTIC N/A 4/10/2018    Procedure: EGD AND COLONOSCOPY;  Surgeon: Danny Springer MD;  [facility]; Service: Gastroenterology      18 0810   Note Type   Note type Eval/Treat   Pain Assessment   Pain Assessment FLACC   Pain Type Chronic pain   Pain Location (lateral thigh)   Pain Orientation Right   Pain Descriptors Aching;Numbness   Pain Frequency Constant/continuous   Pain Onset Ongoing   Clinical Progression Not changed   Effect of Pain on Daily Activities increases with mobilization, gait and activity   Patient's Stated Pain Goal No pain   Hospital Pain Intervention(s) Repositioned;Elevated; Emotional support; Rest   Response to Interventions tolerated PT   Multiple Pain Sites No   Pain Rating: FLACC (Rest) - Face 1   Pain Rating: FLACC (Rest) - Legs 0   Pain Rating: FLACC (Rest) - Activity 0   Pain Rating: FLACC (Rest) - Cry 0   Pain Rating: FLACC (Rest) - Consolability 0   Score: FLACC (Rest) 1   Pain Rating: FLACC (Activity) - Face 1   Pain Rating: FLACC (Activity) - Legs 1   Pain Rating: FLACC (Activity) - Activity 1   Pain Rating: FLACC (Activity) - Cry 1   Pain Rating: FLACC (Activity) - Consolability 1   Score: FLACC (Activity) 5   Home Living   Type of Home House   Home Layout Two level;Bed/bath upstairs;Stairs to enter with rails  (6+8 BARAK; 3+9 to second floor)   [address] (none)   Additional Comments Pt lives in 2 story home w/ daughter and   Per HPI relationship with  and daughter are stressors  Prior Function   Level of [place] Independent with ADLs and functional mobility   Lives With Spouse;Daughter   ADL Assistance Independent   IADLs Independent   Falls in the last 6 months 0   Vocational Unemployed  (per HPI just recently lost health insurance)   Comments PTA pt reports (I) w/ functional mobility and ADLS, no recent falls, (+) stairs at home, and (+) driving   Restrictions/Precautions   Weight Bearing Precautions Per Order No   Other Precautions Fall Risk;Pain;Suicidal;Multiple lines;1:1   General   Family/Caregiver Present No   Cognition   Overall Cognitive Status WFL   Arousal/Participation Alert   Orientation Level Oriented X4   Memory Within functional limits   Following Commands Follows multistep commands without difficulty   Comments Pt very emotional and tearful at times     RUE Assessment   RUE Assessment WFL  (see OT eval for details)   LUE Assessment   LUE Assessment WFL  (see OT eval for details)   RLE Assessment   RLE Assessment WFL   Strength RLE   RLE Overall Strength 4/5   LLE Assessment   LLE Assessment WFL   Strength LLE   LLE Overall Strength 4/5   Coordination   Movements are Fluid and Coordinated 1   Sensation X   Light Touch   RLE Light Touch Impaired   RLE Light Touch Comments numbness along L3 and L4 dermatome   LLE Light Touch Grossly intact   Sharp/Dull   RLE Sharp/Dull Not tested   LLE Sharp/Dull Not tested   Proprioception   RLE Proprioception Grossly intact   LLE Proprioception Grossly Intact   Bed Mobility   Supine to Sit 6  Modified independent   Additional items Increased time required;Verbal cues   Sit to Supine Unable to assess   Additional Comments Pt received supine in bed  Verbal cueing just to be aware of multiple lines  Transfers   Sit to Stand 6  Modified independent   Additional items Increased time required;Verbal cues   Stand to Sit 6  Modified independent   Additional items Increased time required;Verbal cues   Additional Comments VC's for general safety and multiple lines awareness  Balance   Static Sitting Normal   Dynamic Sitting Good   Static Standing Fair +   Dynamic Standing Fair   Endurance Deficit   Endurance Deficit Yes   Endurance Deficit Description fatigue, weakness, pain in RLE   Activity Tolerance   Activity Tolerance Patient limited by fatigue;Patient limited by pain   Nurse [address], RN   Assessment   Prognosis Good   Problem List Decreased strength;Decreased endurance; Impaired balance;Decreased mobility; Decreased safety awareness;Pain; Impaired sensation   Assessment Pt is 49 yo female admitted from home w/ daughter and  for lithium overdose  Pt presented to hospital for attempted suicide by lithium overdose  Pt is currently on 1:1  PTA pt (I) w/ all functional mobility, IADLS, and ADLS w/o need for DME, (+) stairs at home, (+) driving, (-) recent falls, and (-) employed  Pt has multiple recent stressors in life: strained relationship with  and daughter, recently lost healthy insurance, and overall "disgusted" with her life  Pt currently demonstrates mod (I) bed mobility, mod (I) transfers, 4/5 BLE MMT, and c/o L3-L4 dermatomal pain RLE  Pt would benefit from continued skilled PT for deficits in strength, mobility, gait, stairs, and endurance  With IP PT goals met home is most likely appropriate discharge recommendation     Barriers to Discharge Inaccessible home environment   Barriers to Discharge Comments 6+8 BARAK, 3+9 stairs to second floor   Goals   Patient Goals "to go home today"   STG Expiration Date 05/28/18   Short Term Goal #1 In 7 days pt will demonstrate: (I) bed mobility and transfers, (I) 200 ft gait training no AD, mod (I) stairs w/ railing as needed, increased BLE MMT by 1/2 grade for functional mobility, improve balance by 1 grade to decrease fall risk,   Treatment Day 1   Plan   Treatment/Interventions Functional transfer training;LE strengthening/ROM; Therapeutic exercise; Endurance training;Patient/family training;Bed mobility;Gait training;Spoke to nursing   PT Frequency (3-5x/wk; may consider d/c from service if goals met)   Recommendation   Recommendation Home with family support   PT - OK to Discharge Yes   Additional Comments Pt would benefit from continued skilled PT to progress towards IP PT goals, optimize pt outcomes, and optimize patient function while admitted  Pt verbally assures PT that she feels confident in her ability to negotiate stairs if she was to go home today and not perform stairs w/ PT;therefore, may discharge home from PT standpoint once medically stable she is at mod (I)/supervision functional level  Modified Karissa Scale   Modified Karissa Scale 2   Barthel Index   Feeding 10   Bathing 5   Grooming Score 5   Dressing Score 5   Bladder Score 0   Bowels Score 10   Toilet Use Score 10   Transfers (Bed/Chair) Score 10   Mobility (Level Surface) Score 0   Stairs Score 0   Barthel Index Score 55   History: co - morbidities, fall risk, multiple line, suicidal, 1:1, 2 level home  Exam: impairments in locomotion, musculoskeletal, balance,posture, joint integrity, strength, functional mobility, safety, sensation, Barthel 55  Clinical: unstable/unpredictable; fall risk, pain, 1:1, telemetry, pulse oximeter  Complexity:high      Braulio Habermann, PT ,DPT  PT treatment immediately following Evaluation  Time In:0800  Time Out:0810  Total Time: 10 minutes      S:  Pt agreeable to participate  Pt wanting to go home today  O:  Pt progressed gait and mobility by gait training 45 ftx2 w/ S and no AD   Limited distance d/t discomfort in RLE/R thigh  Pt demonstrated short stride, decreased arm swing, and slow yan  Cueing for general safety and multiple line management  At end of PT treatment pt left sitting in bedside recliner w/ BLE elevated, all lines intact, all needs in reach, RN Lilly aware of patient status, and 1:1 present  No further complaints, questions or concerns for PT at this time  A:  Pt tolerated treatment fairly  Limited by fatigue, weakness, and discomfort/pain in RLE  Pt would benefit from continued skilled PT to progress towards IP PT goals, optimize patient outcomes, and optimize function  P:  Continue PT POC as written in initial evaluation  Recommend d/c home w/ family support       Zofia Moran, PT,DPT

## 2018-05-21 NOTE — OCCUPATIONAL THERAPY NOTE
[address] Evaluation     Patient Name: Mireya Gonsalves  Today's Date: 2018  Problem List  Patient Active Problem List   Diagnosis    Chronic diarrhea    Irritable bowel syndrome with diarrhea    Recent weight loss    Abdominal pain    Psychiatric disorder    Hypertension    Hyperlipidemia    Depression    [facility] [address] [address] [facility]    [facility]    Lithium overdose    Suicidal behavior     Past Medical History  Past Medical History:   Diagnosis Date    [facility]     [facility]) [facility] )     Depression     Hyperlipidemia     Hypertension     Psychiatric disorder      Past Surgical History  Past Surgical History:   Procedure Laterality Date     SECTION      PANCREAS SURGERY      "pseudocysts" per patient's  Ricki Olson    [place] ESOPHAGOGASTRODUODENOSCOPY TRANSORAL DIAGNOSTIC N/A 4/10/2018    Procedure: EGD AND COLONOSCOPY;  Surgeon: Austen Mixon MD;  [facility]; Service: Gastroenterology         18 0808   Note Type   Note type Eval only   Restrictions/Precautions   Weight Bearing Precautions Per Order No   Other Precautions Fall Risk;Pain;Telemetry;Multiple lines;1:1   Pain Assessment   Pain Assessment FLACC   Pain Type Chronic pain   Pain Location Hip;Leg  (thigh)   Pain Orientation Right   Pain Descriptors Aching;Numbness   Pain Frequency Constant/continuous   Pain Onset Ongoing   Clinical Progression Not changed   Effect of Pain on Daily Activities Increased pain w/ activity   Patient's Stated Pain Goal No pain   Hospital Pain Intervention(s) Repositioned; Ambulation/increased activity; Emotional support   Response to Interventions Tolerated   Pain Rating: FLACC (Rest) - Face 1   Pain Rating: FLACC (Rest) - Legs 0   Pain Rating: FLACC (Rest) - Activity 0   Pain Rating: FLACC (Rest) - Cry 0   Pain Rating: FLACC (Rest) - Consolability 0   Score: FLACC (Rest) 1   Pain Rating: FLACC (Activity) - Face 1   Pain Rating: FLACC (Activity) - Legs 1   Pain Rating: FLACC (Activity) - Activity 1   Pain Rating: FLACC (Activity) - Cry 1   Pain Rating: FLACC (Activity) - Consolability 1   Score: FLACC (Activity) 5   Home Living   Type of Home House   Home Layout Two level;Bed/bath upstairs  (6+8 BARAK, 3+9 to bed/bath on 2nd floor)   Bathroom Shower/Tub Tub/shower unit   Bathroom Toilet Standard   Bathroom Equipment Other (Comment)  (none per pt report)   [address] Other (Comment)  (none per pt report)   Additional Comments Pt lives with spouse and dtr in a 2 level home w/ 6+8 BARAK and 3+9 steps to bed/bath located on 2nd floor  Prior Function   Level of [place] Independent with ADLs and functional mobility   Lives With Spouse;Daughter   Receives Help From Family   ADL Assistance Independent   IADLs Independent   Falls in the last 6 months 0   Vocational Unemployed  (per H&P, pt recently lost health insurance)   Comments At baseline, pt was I w/ ADLs, IADLs, and functional mobility/transfers w/o use of DME/AD, (+) , and reports 0 falls PTA  Lifestyle   Autonomy At baseline, pt was I w/ ADLs, IADLs, and functional mobility/transfers w/o use of DME/AD, (+) , and reports 0 falls PTA     Reciprocal Relationships Lives w/ spouse and dtr   Service to Others Unemployed   Intrinsic Gratification Watching TV   Psychosocial   Psychosocial (WDL) WDL   ADL   Eating Assistance 7  Independent   Grooming Assistance 7  Independent   UB Bathing Assistance 6  Modified Independent   LB Bathing Assistance 6  Modified Independent   UB Dressing Assistance 6  Modified independent   LB Dressing Assistance 5  Supervision/Setup   Toileting Assistance  6  Modified independent   Bed Mobility   Supine to Sit 6  Modified independent   Additional items Increased time required   Sit to Supine Unable to assess  (Pt seated OOB in chair at end of session)   Additional Comments Pt seated OOB in chair at end of session  All needs met and pt reports no further questions for OT at this time   Transfers   Sit to Stand 6  Modified independent   Additional items Increased time required   Stand to Sit 6  Modified independent   Additional items Increased time required   Functional Mobility   Functional Mobility 5  Supervision   Additional Comments Assist x1 w/o use of AD   Balance   Static Sitting Normal   Dynamic Sitting Good   Static Standing Fair +   Dynamic Standing Fair   Ambulatory Fair   Activity Tolerance   Activity Tolerance Patient tolerated treatment well;Patient limited by pain   Nurse Made Aware Pt able to be seen per RN Houston Aldana and CHRISTIANO Guerrero   RUE Assessment   RUE Assessment WFL  (4/5)   LUE Assessment   LUE Assessment WFL  (4/5)   Hand Function   Gross Motor Coordination Functional   Fine Motor Coordination Functional   Sensation   Light Touch No apparent deficits   Sharp/Dull No apparent deficits   Proprioception   Proprioception No apparent deficits   Vision-Basic Assessment   Current Vision Wears glasses all the time   Vision - Complex Assessment   Ocular Range of Motion [facility]   Acuity Able to read clock/calendar on wall without difficulty   Perception   Inattention/Neglect Appears intact   Cognition   Overall Cognitive Status [facility]   Arousal/Participation Alert; Cooperative   Attention Within functional limits   Orientation Level Oriented X4   Memory Within functional limits   Following Commands Follows multistep commands without difficulty   Assessment   Prognosis Fair   Assessment Pt is a 50 y o  female seen for OT evaluation s/p adm to [facility] on 5/20/2018 w/ Lithium overdose  Comorbidities affecting pts functional performance include a significant PMH of bipolar disorder, COPD, Depression, HLD, HTN, and psychiatric disorder  Pt with active OT orders and activity orders for Activity as tolerated   Pt lives with spouse and dtr in a 2 level home w/ 6+8 BARAK and 3+9 steps to bed/bath located on 2nd floor  At baseline, pt was I w/ ADLs, IADLs, and functional mobility/transfers w/o use of DME/AD, (+) , and reports 0 falls PTA  Upon evaluation, pt currently functioning at an overall Supervision-Mod I level for overall ADLs and Supervision-Mod I for functional mobility/transfers 2* the following deficits impacting occupational performance: weakness, decreased tolerance and increased pain  These impairments, however do not limit pts ability to safely engage in all baseline areas of occupation, including grooming, bathing, dressing, toileting, functional mobility/transfers, community mobility, laundry , house maintenance, meal prep, cleaning, social participation  and leisure activities as pt reports she is functioning at baseline level of performance and has no concerns regarding performance in ADLs, IADLs, and functional mobility/transfers  Pt scored overall 75/100 on the Barthel Index  Based on the aforementioned OT evaluation, functional performance deficits, and assessments, pt has been identified as a moderate complexity evaluation  No further acute OT needs identified at this time  Recommend continued mobilization with hospital staff while in the hospital to increase pts endurance and strength upon D/C  From OT standpoint, recommend D/C home with family support when medically cleared  D/C pt from OT caseload at this time      Goals   Patient Goals to go home today   Plan   OT Frequency Eval only   Recommendation   OT Discharge Recommendation Home with family support   OT - OK to Discharge Yes  (when medically cleared)   Barthel Index   Feeding 10   Bathing 5   Grooming Score 5   Dressing Score 5   Bladder Score 0   Bowels Score 10   Toilet Use Score 10   Transfers (Bed/Chair) Score 10   Mobility (Level Surface) Score 0   Stairs Score 0   Barthel Index Score 55   Modified Kerr Scale   Modified Karissa Scale 2       Salvador Thrasher, OTR/L

## 2018-05-21 NOTE — PLAN OF CARE
Skin integrity is maintained or improved Progressing      Nutrient/Hydration intake appropriate for improving, restoring or maintaining nutritional needs Progressing      Patient will remain free of falls Progressing      Spouse is at bedside talking in a calming manner; pt is still crying off and on; psych meds administered

## 2018-05-21 NOTE — PHYSICIAN ADVISOR
Current patient class: Inpatient  The patient is currently on Hospital Day: 2      The patient was admitted to the hospital at [phone] on 5/20/18 for the following diagnosis:  Overdose [T50 901A]  Suicide attempt ([place] [address] ) Belkis Agosto  [place] overdose, intentional self-harm, initial encounter ([place] [address] ) [C39 178Q]  Bipolar affective disorder, remission status unspecified ([place] [address] ) [F31 9]  Depression, unspecified depression type [F32 9]  Suicidal behavior with attempted self-injury ([place] [address] ) Belkis Agosto       There is documentation in the medical record of an expected length of stay of at least 2 midnights  The patient is therefore expected to satisfy the 2 midnight benchmark and given the 2 midnight presumption is appropriate for INPATIENT ADMISSION  Given this expectation of a satisfying stay, CMS instructs us that the patient is most often appropriate for inpatient admission under part A provided medical necessity is documented in the chart  After review of the relevant documentation, labs, vital signs and test results, the patient is appropriate for INPATIENT ADMISSION  Admission to the hospital as an inpatient is a complex decision making process which requires the practitioner to consider the patients presenting complaint, history and physical examination and all relevant testing  With this in mind, in this case, the patient was deemed appropriate for INPATIENT ADMISSION  After review of the documentation and testing available at the time of the admission I concur with this clinical determination of medical necessity  Rationale is as follows: The patient is a 50 yrs old Female who presented to the ED at 5/20/2018 12:15 PM with a chief complaint of Overdose - Intentional (intentionally took an overdose of lithium, combined with alcohol  )     Currently the patient is admitted for lithium overdose, bipolar disorder, suicidal behavior, and does require close monitoring for potential adverse effects    The patient will remain hospitalized further, and will satisfy the 2 midnight benchmark  Given the need for further hospitalization, and along with the documentation of medical necessity present in the chart, the patient is appropriate for inpatient admission  The patient is expected to satisfy the 2 midnight benchmark, and will require further acute medical care  The patient does have comorbid conditions which increases the risk for significant adverse outcome  Given this the patient is appropriate for inpatient admission  The patients vitals on arrival were ED Triage Vitals   Temperature Pulse Respirations Blood Pressure SpO2   18 1226 18 1219 18 1219 18 1219 18 1219   98 3 °F (36 8 °C) (!) 130 (!) 24 135/85 92 %      Temp Source Heart Rate Source Patient Position - Orthostatic VS BP Location FiO2 (%)   18 1226 18 1219 18 1219 18 1219 --   Oral Monitor Lying Right arm       Pain Score       18 1612       6           Past Medical History:   Diagnosis Date    Bipolar disorder ([address] )     COPD (chronic obstructive pulmonary disease) (HCC)     Depression     Hyperlipidemia     Hypertension     Psychiatric disorder      Past Surgical History:   Procedure Laterality Date     SECTION      PANCREAS SURGERY      "pseudocysts" per patient's  Ricki Olson    [place] ESOPHAGOGASTRODUODENOSCOPY TRANSORAL DIAGNOSTIC N/A 4/10/2018    Procedure: EGD AND COLONOSCOPY;  Surgeon: Yola Werner MD;  [facility];   Service: Gastroenterology           Consults have been placed to:   IP CONSULT TO PSYCHIATRY  IP CONSULT TO CASE MANAGEMENT    Vitals:    18 1500 18 1513 18 1558 18 1710   BP:   131/75 155/82   BP [place]    Right arm   Pulse: 96  98 66   Resp: (!) 36  (!) 35 18   Temp:  98 1 °F (36 7 °C)  98 2 °F (36 8 °C)   TempSrc:  Temporal  Tympanic   SpO2: 97%  95% 98%   Height:           Most recent labs:    Recent Labs 05/20/18   1233   05/21/18   0512   WBC  9 92   --   7 60   HGB  15 1   --   12 6   HCT  46 0   --   40 1   PLT  331   < >  245   K  4 8   --   3 8   NA  134*   --   139   CALCIUM  10 2*   --   8 5   BUN  5   --   4*   CREATININE  0 98   --   0 70   INR  1 01   --    --    AST  25   --    --    ALT  20   --    --    ALKPHOS  74   --    --    BILITOT  0 32   --    --     < > = values in this interval not displayed         Scheduled Meds:  Current Facility-Administered Medications:  EMS replenish medication  Does not apply Once Triage Protocol Emergency, MD    acetaminophen 650 mg Oral Q6H PRN Annetta Montes PA-C    budesonide-formoterol 2 puff Inhalation BID Annetta Montes PA-C    carBAMazepine 100 mg Oral BID Libby Ordoñez MD    DULoxetine 60 mg Oral Daily Libby Ordoñez MD    heparin (porcine) 5,000 Units Subcutaneous Q8H [address] Annetta Montes PA-C    ibuprofen 400 mg Oral Q6H PRN CARLITOS Osorio    LORazepam 1 mg Intravenous 4x Daily PRN Annetta Montes PA-C    morphine injection 1 mg Intravenous Q4H PRN Bright Cordero PA-C    nicotine 1 patch Transdermal Daily Annetta Montes PA-C    ondansetron 4 mg Intravenous Q6H PRN Annetta Montes PA-C    pantoprazole 40 mg Oral Early Morning Annetta Montes PA-C    sodium chloride 75 mL/hr Intravenous Continuous Libby Ordoñez MD Last Rate: 75 mL/hr (05/21/18 1253)     Continuous Infusions:  sodium chloride 75 mL/hr Last Rate: 75 mL/hr (05/21/18 1253)     PRN Meds: acetaminophen    ibuprofen    LORazepam    morphine injection    ondansetron    Surgical procedures (if appropriate):

## 2018-05-22 LAB
ANION GAP SERPL CALCULATED.3IONS-SCNC: 6 MMOL/L (ref 4–13)
BUN SERPL-MCNC: 3 MG/DL (ref 5–25)
CALCIUM SERPL-MCNC: 9.1 MG/DL (ref 8.3–10.1)
CHLORIDE SERPL-SCNC: 106 MMOL/L (ref 100–108)
CO2 SERPL-SCNC: 29 MMOL/L (ref 21–32)
CREAT SERPL-MCNC: 0.67 MG/DL (ref 0.6–1.3)
GFR SERPL CREATININE-BSD FRML MDRD: 104 ML/MIN/1.73SQ M
GLUCOSE SERPL-MCNC: 101 MG/DL (ref 65–140)
LITHIUM SERPL-SCNC: 0.6 MMOL/L (ref 0.5–1)
POTASSIUM SERPL-SCNC: 3.6 MMOL/L (ref 3.5–5.3)
SODIUM SERPL-SCNC: 141 MMOL/L (ref 136–145)

## 2018-05-22 PROCEDURE — 99232 SBSQ HOSP IP/OBS MODERATE 35: CPT | Performed by: INTERNAL MEDICINE

## 2018-05-22 PROCEDURE — 80178 ASSAY OF LITHIUM: CPT | Performed by: INTERNAL MEDICINE

## 2018-05-22 PROCEDURE — 80048 BASIC METABOLIC PNL TOTAL CA: CPT | Performed by: INTERNAL MEDICINE

## 2018-05-22 RX ADMIN — DULOXETINE 60 MG: 60 CAPSULE, DELAYED RELEASE ORAL at 08:58

## 2018-05-22 RX ADMIN — MORPHINE SULFATE 1 MG: 2 INJECTION, SOLUTION INTRAMUSCULAR; INTRAVENOUS at 16:50

## 2018-05-22 RX ADMIN — SODIUM CHLORIDE 75 ML/HR: 0.9 INJECTION, SOLUTION INTRAVENOUS at 02:17

## 2018-05-22 RX ADMIN — CARBAMAZEPINE 100 MG: 100 TABLET, EXTENDED RELEASE ORAL at 00:12

## 2018-05-22 RX ADMIN — ONDANSETRON 4 MG: 2 INJECTION INTRAMUSCULAR; INTRAVENOUS at 23:44

## 2018-05-22 RX ADMIN — CARBAMAZEPINE 100 MG: 100 TABLET, EXTENDED RELEASE ORAL at 09:01

## 2018-05-22 RX ADMIN — BUDESONIDE AND FORMOTEROL FUMARATE DIHYDRATE 2 PUFF: 160; 4.5 AEROSOL RESPIRATORY (INHALATION) at 09:01

## 2018-05-22 RX ADMIN — PANTOPRAZOLE SODIUM 40 MG: 40 TABLET, DELAYED RELEASE ORAL at 06:41

## 2018-05-22 RX ADMIN — MORPHINE SULFATE 1 MG: 2 INJECTION, SOLUTION INTRAMUSCULAR; INTRAVENOUS at 21:49

## 2018-05-22 RX ADMIN — MORPHINE SULFATE 1 MG: 2 INJECTION, SOLUTION INTRAMUSCULAR; INTRAVENOUS at 06:41

## 2018-05-22 RX ADMIN — LORAZEPAM 1 MG: 2 INJECTION INTRAMUSCULAR; INTRAVENOUS at 22:28

## 2018-05-22 RX ADMIN — NICOTINE 1 PATCH: 14 PATCH TRANSDERMAL at 21:44

## 2018-05-22 RX ADMIN — BUDESONIDE AND FORMOTEROL FUMARATE DIHYDRATE 2 PUFF: 160; 4.5 AEROSOL RESPIRATORY (INHALATION) at 16:50

## 2018-05-22 RX ADMIN — HEPARIN SODIUM 5000 UNITS: 5000 INJECTION, SOLUTION INTRAVENOUS; SUBCUTANEOUS at 21:50

## 2018-05-22 RX ADMIN — MORPHINE SULFATE 1 MG: 2 INJECTION, SOLUTION INTRAMUSCULAR; INTRAVENOUS at 01:59

## 2018-05-22 RX ADMIN — CARBAMAZEPINE 100 MG: 100 TABLET, EXTENDED RELEASE ORAL at 16:50

## 2018-05-22 RX ADMIN — ACETAMINOPHEN 650 MG: 325 TABLET ORAL at 13:27

## 2018-05-22 RX ADMIN — MORPHINE SULFATE 1 MG: 2 INJECTION, SOLUTION INTRAMUSCULAR; INTRAVENOUS at 11:41

## 2018-05-22 RX ADMIN — LORAZEPAM 1 MG: 2 INJECTION INTRAMUSCULAR; INTRAVENOUS at 08:58

## 2018-05-22 RX ADMIN — HEPARIN SODIUM 5000 UNITS: 5000 INJECTION, SOLUTION INTRAVENOUS; SUBCUTANEOUS at 06:41

## 2018-05-22 RX ADMIN — LORAZEPAM 1 MG: 2 INJECTION INTRAMUSCULAR; INTRAVENOUS at 01:59

## 2018-05-22 NOTE — CONSULTS
Psychiatric Evaluation - Behavioral Health       Assessment/Plan  Principal Problem:  F31 4 bipolar disorder severe depressed without psychotic feature  F10 20 alcohol use disorder severe  PLAN:   1) patient at this time is refusing to sign a 201 she wants to discuss it with her  however this writer's a discussed with her in detail and explain to her that to one process and told her that this writer is made a clinical decision that patient needs to come in for inpatient psychiatric treatment if patient shows did not sign adrenal one this writer will make a choice for the decision to bring her in on a 302  Patient says that she wants to think about that  Will do well re-evaluate the patient tomorrow and discuss the issue again  Patient is currently not medically clear  2) continue one on one observation  3) Communicated with patients'communicated to patient's nursing team       Chief Complaint: "I had an argument with my daughter  t "    History of Present Illness: This is a 70-year-old  female who was admitted after she presented with an overdose on lithium 20-30 pills  Patient says that she was drinking prior to that  Patient says that she ran out of her insurance and since then she has not seen a therapist or psychiatrist   She stopped taking her psychotropic medications since that she is getting more and more depressed and she started drinking bloody [place] and she became more depressed her daughter has been disrespectful towards her and had a spectral towards her mental illness that is bipolar disorder she had argument her daughter and  started dieting the daughter she became very upset and overdosed on lithium his suicide attempt  Patient was crying uncontrollably and had labile mood and affect    This writer attempted to explain to patient that she is high risk and she needs to come in for inpatient psychiatric treatment patient is unable to understand her current situation and that she need to go home she took the 201 form read completely and was reluctant to sign the a 201 form and said she wanted discuss that with her   This writer feels that patient is high risk and need to come in for inpatient psychiatric treatment if patient refused to sign a 201 and this writer recommend to have her come in on a 302 mental health commitment  Patient says that she was having trouble sleeping she said that for a few days she was feeling hopeless but was not having clear suicidal ideas denied any hallucinations or delusions  PAST PSYCH HISTORY:    Previous history of bipolar  Disorder inpatient hospitalizations also sees outpatient psychiatrist but recently has not seen outpatient psychiatrist because she is unable to keep her appointment due to lack of insurance she has no financial support    Substance Abuse History: In the past she has used drugs and alcohol but recently she is denying having any alcohol problems however on the weekend she drinks a six pack usually she does not drink liquor but in presentation she was drinking alcohol and had blood alcohol level  Family Psychiatric History:   Daughter has mental health issues  Social History:  Social History     Social History    Marital status: /Civil Union     Spouse name: N/A    Number of children: N/A    Years of education: N/A     Occupational History    Not on file  Social History Main Topics    Smoking status: Current Every Day Smoker     Packs/day: 1 00     Types: Cigarettes    Smokeless tobacco: Current User    Alcohol use Yes      Comment: vodka and beer    Drug use: Yes     Types: Marijuana    Sexual activity: Not on file     Other Topics Concern    Not on file     Social History Narrative    No narrative on file         Traumatic History:   Abuse: None  Other Traumatic Events: None      Past Medical History:   Diagnosis Date    [facility] disorder ([facility] [address]     COPD (chronic obstructive pulmonary disease) ([facility] [address] )     Depression     Hyperlipidemia     Hypertension     Psychiatric disorder    P    Medical Review Of Systems:  All 12 point review of system is normal except for what is mention in medical hisotry  Scheduled Meds:  Current Facility-Administered Medications:  EMS replenish medication  Does not apply Once Triage Protocol Emergency, MD    acetaminophen 650 mg Oral Q6H PRN Annetta Montes, PA-C    budesonide-formoterol 2 puff Inhalation BID Annetta Montes PA-C    carBAMazepine 100 mg Oral BID Susi Foley MD    DULoxetine 60 mg Oral Daily Susi Foley MD    heparin (porcine) 5,000 Units Subcutaneous Q8H [address] Annetta Piger, PA-C    ibuprofen 400 mg Oral Q6H PRN Héctor Carlin, CRNP    LORazepam 1 mg Intravenous 4x Daily PRN Annetta Montes, PA-C    morphine injection 1 mg Intravenous Q4H PRN Rayna Honlionel, PA-C    nicotine 1 patch Transdermal Daily Annetta Montes PA-C    ondansetron 4 mg Intravenous Q6H PRN Annetta Piger, PA-C    pantoprazole 40 mg Oral Early Morning Annetta Montes, PA-C    sodium chloride 75 mL/hr Intravenous Continuous Susi Foley MD Last Rate: 75 mL/hr (05/21/18 1253)     Continuous Infusions:  sodium chloride 75 mL/hr Last Rate: 75 mL/hr (05/21/18 1253)     PRN Meds: acetaminophen    ibuprofen    LORazepam    morphine injection    ondansetron     Allergies   Allergen Reactions    Sulfa Antibiotics        Vitals:    05/21/18 2311   BP: 120/82   Pulse: 90   Resp: 18   Temp: 98 2 °F (36 8 °C)   SpO2: 100%             Mental Status Evaluation:  Appearance:  Patient appeared much older than her age she was disheveled   Behavior:  Patient is a behavior that is non cooperative guarded evasive  Speech:   speech is normal goal directed    Mood:  Depressed   Affect Anxious, tearful, labile  Language: naming objects and Goal directed     Thought Process:   logical and linear    Thought Content:  Normal   Perceptual Disturbances:  denying any auditory and visual hallucinations  Risk Potential: Currently denying suicidal ideas but attempted suicide by overdosing on lithium which is a very serious attempt  Sensorium:  person, place, time/date, situation, day of week, month of year and year   Cognition:  grossly intact   Consciousness:   alert, awake, and oriented ×3    Attention: Poor attention span   Intellect: Normal   Fund of Knowledge: awareness of current events: normal    Insight:  She has poor insight   Judgment: Poor judgment   Muscle Strength and Tone: Normal    Gait/Station:  gait was not assessed    Motor Activity: Increased psychomotor activity     Lab Results: I have personally reviewed pertinent lab results  NOTE:  Total of 40 minutes were spent in talking to patient completing this medical record reviewing medical chart medical decision making    Adonay Rollins MD

## 2018-05-22 NOTE — PLAN OF CARE
Problem: Potential for Falls  Goal: Patient will remain free of falls  INTERVENTIONS:  - Assess patient frequently for physical needs  -  Identify cognitive and physical deficits and behaviors that affect risk of falls  -  [place] fall precautions as indicated by assessment   - Educate patient/family on patient safety including physical limitations  - Instruct patient to call for assistance with activity based on assessment  - Modify environment to reduce risk of injury  - Consider OT/PT consult to assist with strengthening/mobility   Outcome: Progressing      Problem: COPING  Goal: Pt/Family able to verbalize concerns and demonstrate effective coping strategies  INTERVENTIONS:  - Assist patient/family to identify coping skills, available support systems and cultural and spiritual values  - Provide emotional support, including active listening and acknowledgement of concerns of patient and caregivers  - Reduce environmental stimuli, as able  - Provide patient education  - Assess for spiritual pain/suffering and initiate spiritual care, including notification of Pastoral Care or maynor based community as needed  - Assess effectiveness of coping strategies  Outcome: Progressing    Goal: Will report anxiety at manageable levels  INTERVENTIONS:  - Administer medication as ordered  - Teach and encourage coping skills  - Provide emotional support  - Assess patient/family for anxiety and ability to cope  Outcome: Progressing      Problem: Depression - IP adult  Goal: Effects of depression will be minimized  INTERVENTIONS:  - Assess impact of patient's symptoms on level of functioning, self-care needs and offer support as indicated  - Assess patient/family knowledge of depression, impact on illness and need for teaching  - Provide emotional support, presence and reassurance  - Assess for possible suicidal thoughts, ideation or self-harm   If patient expresses suicidal thoughts or statements do not leave alone, notify physician/AP immediately, initiate Suicide Precautions, and determine need for continual observation   - Initiate consults and referrals as appropriate (a mental health professional, Spiritual Care)  - Administer medication as ordered  Outcome: Progressing      Problem: SELF HARM  Goal: Effect of psychiatric condition will be minimized and patient will be protected from self harm  INTERVENTIONS:  - Assess impact of patient's symptoms on level of functioning, self-care needs and offer support as indicated  - Assess patient/family knowledge of depression, impact on illness and need for teaching  - Provide emotional support, presence and reassurance  - Assess for possible suicidal thoughts, ideation or self-harm  If patient expresses suicidal thoughts or statements do not leave alone, notify physician/AP immediately, initiate suicide precautions, and determine need for continual observation    - initiate consults and referrals as appropriate (a mental health professional, Spiritual Care  Outcome: Progressing

## 2018-05-22 NOTE — PROGRESS NOTES
[facility] Progress Note  Patient: Shruthi Magallon 50 y o  female   MRN: [id number]  PCP: Stephani Newman DO  Unit/Bed#: E5 -01 Encounter: [id number]  Date Of Visit: 18    Assessment:    Principal Problem:    Lithium overdose  Active Problems:    Hypertension    Hyperlipidemia    Depression    COPD (chronic obstructive pulmonary disease) ([facility] [address] )    Bipolar disorder ([facility] )    Suicidal behavior      Plan:    · Lithium trending down to 0 6 , can discontinue IV fluids no further reason for telemetry patient is medically clear for inpatient psychiatric evaluation as per psych consultation done yesterday 201 versus 302  · Depression/ Bipolar emains despondent and labile psych saw and feels needs inpatient psychiatric care will start back on Tegretol and Cymbalta  · COPD stable   · Hypertension can resume lisinopril as been taking as outpatient as renal function stable       VTE Pharmacologic Prophylaxis:   Pharmacologic: Heparin  Mechanical VTE Prophylaxis in Place: Yes    Discussions with Specialists or Other Care Team Provider:  Patient is stable for discharge to inpatient psych facility when available    Time Spent for Care: 45 minutes  More than 50% of total time spent on counseling and coordination of care as described above  Subjective:   Remains tearful and hopeless from her stressors , though denies present wish to hurt herself she is quite labile anxious and still drug-seeking    Objective:     Vitals:   Temp (24hrs), Av °F (36 7 °C), Min:97 6 °F (36 4 °C), Max:98 2 °F (36 8 °C)    HR:  [66-98] 92  Resp:  [18-36] 18  BP: (120-155)/(75-85) 152/85  SpO2:  [95 %-100 %] 96 %  There is no height or weight on file to calculate BMI  Input and Output Summary (last 24 hours):        Intake/Output Summary (Last 24 hours) at 18 1335  Last data filed at 18 1145   Gross per 24 hour   Intake              900 ml   Output             5350 ml   Net            -4450 ml Physical Exam:     Physical Exam:   General appearance: alert, distracted, appears stated age and cooperative  Head: Normocephalic, without obvious abnormality, atraumatic  Lungs: clear to auscultation bilaterally  Heart: regular rate and rhythm  Abdomen: soft, non-tender; bowel sounds normal; no masses,  no organomegaly  Back: negative  Extremities: extremities normal, atraumatic, no cyanosis or edema  Neurologic: Grossly normal, labile mood       Additional Data:     Labs:      Results from last 7 days  Lab Units 05/21/18  0512  05/20/18  1233   WBC Thousand/uL 7 60  --  9 92   HEMOGLOBIN g/dL 12 6  --  15 1   HEMATOCRIT % 40 1  --  46 0   PLATELETS Thousands/uL 245  < > 331   NEUTROS PCT %  --   --  72   LYMPHS PCT %  --   --  22   MONOS PCT %  --   --  5   EOS PCT %  --   --  1   < > = values in this interval not displayed  Results from last 7 days  Lab Units 05/22/18  0537  05/20/18  1233   SODIUM mmol/L 141  < > 134*   POTASSIUM mmol/L 3 6  < > 4 8   CHLORIDE mmol/L 106  < > 97*   CO2 mmol/L 29  < > 31   BUN mg/dL 3*  < > 5   CREATININE mg/dL 0 67  < > 0 98   CALCIUM mg/dL 9 1  < > 10 2*   TOTAL PROTEIN g/dL  --   --  8 0   BILIRUBIN TOTAL mg/dL  --   --  0 32   ALK PHOS U/L  --   --  74   ALT U/L  --   --  20   AST U/L  --   --  25   GLUCOSE RANDOM mg/dL 101  < > 79   < > = values in this interval not displayed  Results from last 7 days  Lab Units 05/20/18  1233   INR  1 01       * I Have Reviewed All Lab Data Listed Above  * Additional Pertinent Lab Tests Reviewed: [address] [facility] Admission Reviewed    Imaging:  No results found  Imaging Reports Reviewed Today Include: nonwe  Imaging Personally Reviewed by Myself Includes:  *  No results found       Recent Cultures (last 7 days):           Last 24 Hours Medication List:     Current Facility-Administered Medications:  EMS replenish medication  Does not apply Once Triage Protocol Emergency, MD    acetaminophen 650 mg Oral Q6H PRN Annetta Montes PA-C    budesonide-formoterol 2 puff Inhalation BID Andrea Hoff PA-C    carBAMazepine 100 mg Oral BID Stephanie Wiggins MD    DULoxetine 60 mg Oral Daily Stephanie Wiggins MD    heparin (porcine) 5,000 Units Subcutaneous [facility] Annetta Montes PA-C    ibuprofen 400 mg Oral Q6H PRN Miranda Linclarke, MIGUELITONP    LORazepam 1 mg Intravenous 4x Daily PRN Annetta Montes PA-C    morphine injection 1 mg Intravenous Q4H PRN Andrea Hoff PA-C    nicotine 1 patch Transdermal Daily Annetta Montes PA-C    ondansetron 4 mg Intravenous Q6H PRN Annetta Montes PA-C    pantoprazole 40 mg Oral Early Morning Annetta Montes PA-C    sodium chloride 75 mL/hr Intravenous Continuous Stephanie Wiggins MD Last Rate: 75 mL/hr (05/22/18 0217)        Today, Patient Was Seen By: Stephanie Wiggins MD    ** Please Note: Dragon 360 Dictation voice to text software may have been used in the creation of this document   **

## 2018-05-23 ENCOUNTER — HOSPITAL ENCOUNTER (INPATIENT)
Facility: HOSPITAL | Age: 48
LOS: 7 days | Discharge: HOME | DRG: 751 | End: 2018-05-30
Attending: PSYCHIATRY & NEUROLOGY | Admitting: PSYCHIATRY & NEUROLOGY
Payer: COMMERCIAL

## 2018-05-23 VITALS
SYSTOLIC BLOOD PRESSURE: 154 MMHG | TEMPERATURE: 97.7 F | HEIGHT: 66 IN | HEART RATE: 99 BPM | DIASTOLIC BLOOD PRESSURE: 85 MMHG | RESPIRATION RATE: 19 BRPM | OXYGEN SATURATION: 95 %

## 2018-05-23 DIAGNOSIS — F31.9 BIPOLAR AFFECTIVE DISORDER, REMISSION STATUS UNSPECIFIED (HCC): ICD-10-CM

## 2018-05-23 DIAGNOSIS — F33.2 SEVERE EPISODE OF RECURRENT MAJOR DEPRESSIVE DISORDER, WITHOUT PSYCHOTIC FEATURES (HCC): ICD-10-CM

## 2018-05-23 DIAGNOSIS — I10 ESSENTIAL HYPERTENSION: ICD-10-CM

## 2018-05-23 DIAGNOSIS — T56.892A: ICD-10-CM

## 2018-05-23 DIAGNOSIS — M79.2 NEUROGENIC PAIN: Primary | ICD-10-CM

## 2018-05-23 DIAGNOSIS — F41.1 GENERALIZED ANXIETY DISORDER: ICD-10-CM

## 2018-05-23 PROCEDURE — 99239 HOSP IP/OBS DSCHRG MGMT >30: CPT | Performed by: INTERNAL MEDICINE

## 2018-05-23 PROCEDURE — 99244 OFF/OP CNSLTJ NEW/EST MOD 40: CPT | Performed by: PSYCHIATRY & NEUROLOGY

## 2018-05-23 RX ORDER — BENZTROPINE MESYLATE 1 MG/ML
1 INJECTION INTRAMUSCULAR; INTRAVENOUS EVERY 6 HOURS PRN
Status: CANCELLED | OUTPATIENT
Start: 2018-05-23

## 2018-05-23 RX ORDER — BENZTROPINE MESYLATE 1 MG/1
1 TABLET ORAL EVERY 6 HOURS PRN
Status: DISCONTINUED | OUTPATIENT
Start: 2018-05-23 | End: 2018-05-30 | Stop reason: HOSPADM

## 2018-05-23 RX ORDER — LORAZEPAM 2 MG/ML
1 INJECTION INTRAMUSCULAR EVERY 8 HOURS PRN
Status: DISCONTINUED | OUTPATIENT
Start: 2018-05-23 | End: 2018-05-30 | Stop reason: HOSPADM

## 2018-05-23 RX ORDER — DULOXETIN HYDROCHLORIDE 20 MG/1
20 CAPSULE, DELAYED RELEASE ORAL DAILY
Status: DISCONTINUED | OUTPATIENT
Start: 2018-05-24 | End: 2018-05-24

## 2018-05-23 RX ORDER — LISINOPRIL 10 MG/1
10 TABLET ORAL DAILY
Status: DISCONTINUED | OUTPATIENT
Start: 2018-05-23 | End: 2018-05-23 | Stop reason: HOSPADM

## 2018-05-23 RX ORDER — OLANZAPINE 10 MG/1
5 INJECTION, POWDER, LYOPHILIZED, FOR SOLUTION INTRAMUSCULAR
Status: CANCELLED | OUTPATIENT
Start: 2018-05-23

## 2018-05-23 RX ORDER — NICOTINE 21 MG/24HR
1 PATCH, TRANSDERMAL 24 HOURS TRANSDERMAL DAILY
Status: CANCELLED | OUTPATIENT
Start: 2018-05-23

## 2018-05-23 RX ORDER — LORAZEPAM 2 MG/ML
1 INJECTION INTRAMUSCULAR EVERY 8 HOURS PRN
Status: CANCELLED | OUTPATIENT
Start: 2018-05-23

## 2018-05-23 RX ORDER — BENZTROPINE MESYLATE 1 MG/1
1 TABLET ORAL EVERY 6 HOURS PRN
Status: CANCELLED | OUTPATIENT
Start: 2018-05-23

## 2018-05-23 RX ORDER — LORAZEPAM 1 MG/1
1 TABLET ORAL EVERY 8 HOURS PRN
Status: DISCONTINUED | OUTPATIENT
Start: 2018-05-23 | End: 2018-05-24

## 2018-05-23 RX ORDER — LORAZEPAM 0.5 MG/1
0.5 TABLET ORAL EVERY 6 HOURS PRN
Status: DISCONTINUED | OUTPATIENT
Start: 2018-05-23 | End: 2018-05-23 | Stop reason: HOSPADM

## 2018-05-23 RX ORDER — NICOTINE 21 MG/24HR
21 PATCH, TRANSDERMAL 24 HOURS TRANSDERMAL DAILY
Status: DISCONTINUED | OUTPATIENT
Start: 2018-05-23 | End: 2018-05-23 | Stop reason: HOSPADM

## 2018-05-23 RX ORDER — OLANZAPINE 5 MG/1
5 TABLET ORAL
Status: DISCONTINUED | OUTPATIENT
Start: 2018-05-23 | End: 2018-05-30 | Stop reason: HOSPADM

## 2018-05-23 RX ORDER — BENZTROPINE MESYLATE 1 MG/ML
1 INJECTION INTRAMUSCULAR; INTRAVENOUS EVERY 6 HOURS PRN
Status: DISCONTINUED | OUTPATIENT
Start: 2018-05-23 | End: 2018-05-30 | Stop reason: HOSPADM

## 2018-05-23 RX ORDER — TRAMADOL HYDROCHLORIDE 50 MG/1
50 TABLET ORAL EVERY 6 HOURS PRN
Status: DISCONTINUED | OUTPATIENT
Start: 2018-05-23 | End: 2018-05-23 | Stop reason: HOSPADM

## 2018-05-23 RX ORDER — HALOPERIDOL 5 MG/ML
5 INJECTION INTRAMUSCULAR EVERY 6 HOURS PRN
Status: CANCELLED | OUTPATIENT
Start: 2018-05-23

## 2018-05-23 RX ORDER — OLANZAPINE 5 MG/1
5 TABLET ORAL
Status: CANCELLED | OUTPATIENT
Start: 2018-05-23

## 2018-05-23 RX ORDER — MAGNESIUM HYDROXIDE/ALUMINUM HYDROXICE/SIMETHICONE 120; 1200; 1200 MG/30ML; MG/30ML; MG/30ML
30 SUSPENSION ORAL EVERY 4 HOURS PRN
Status: CANCELLED | OUTPATIENT
Start: 2018-05-23

## 2018-05-23 RX ORDER — DULOXETIN HYDROCHLORIDE 20 MG/1
20 CAPSULE, DELAYED RELEASE ORAL DAILY
Status: CANCELLED | OUTPATIENT
Start: 2018-05-23

## 2018-05-23 RX ORDER — OLANZAPINE 10 MG/1
5 INJECTION, POWDER, LYOPHILIZED, FOR SOLUTION INTRAMUSCULAR
Status: DISCONTINUED | OUTPATIENT
Start: 2018-05-23 | End: 2018-05-30 | Stop reason: HOSPADM

## 2018-05-23 RX ORDER — LORAZEPAM 1 MG/1
1 TABLET ORAL EVERY 8 HOURS PRN
Status: CANCELLED | OUTPATIENT
Start: 2018-05-23

## 2018-05-23 RX ORDER — NICOTINE 21 MG/24HR
1 PATCH, TRANSDERMAL 24 HOURS TRANSDERMAL DAILY
Status: DISCONTINUED | OUTPATIENT
Start: 2018-05-24 | End: 2018-05-30 | Stop reason: HOSPADM

## 2018-05-23 RX ORDER — HALOPERIDOL 5 MG
5 TABLET ORAL EVERY 8 HOURS PRN
Status: CANCELLED | OUTPATIENT
Start: 2018-05-23

## 2018-05-23 RX ORDER — MAGNESIUM HYDROXIDE/ALUMINUM HYDROXICE/SIMETHICONE 120; 1200; 1200 MG/30ML; MG/30ML; MG/30ML
30 SUSPENSION ORAL EVERY 4 HOURS PRN
Status: DISCONTINUED | OUTPATIENT
Start: 2018-05-23 | End: 2018-05-30 | Stop reason: HOSPADM

## 2018-05-23 RX ORDER — HALOPERIDOL 5 MG/ML
5 INJECTION INTRAMUSCULAR EVERY 6 HOURS PRN
Status: DISCONTINUED | OUTPATIENT
Start: 2018-05-23 | End: 2018-05-30 | Stop reason: HOSPADM

## 2018-05-23 RX ORDER — HALOPERIDOL 5 MG
5 TABLET ORAL EVERY 8 HOURS PRN
Status: DISCONTINUED | OUTPATIENT
Start: 2018-05-23 | End: 2018-05-30 | Stop reason: HOSPADM

## 2018-05-23 RX ADMIN — LISINOPRIL 10 MG: 10 TABLET ORAL at 15:47

## 2018-05-23 RX ADMIN — LORAZEPAM 1 MG: 2 INJECTION INTRAMUSCULAR; INTRAVENOUS at 06:14

## 2018-05-23 RX ADMIN — PANTOPRAZOLE SODIUM 40 MG: 40 TABLET, DELAYED RELEASE ORAL at 06:12

## 2018-05-23 RX ADMIN — LORAZEPAM 0.5 MG: 0.5 TABLET ORAL at 19:17

## 2018-05-23 RX ADMIN — NICOTINE 21 MG: 21 PATCH, EXTENDED RELEASE TRANSDERMAL at 14:00

## 2018-05-23 RX ADMIN — CARBAMAZEPINE 100 MG: 100 TABLET, EXTENDED RELEASE ORAL at 08:57

## 2018-05-23 RX ADMIN — BUDESONIDE AND FORMOTEROL FUMARATE DIHYDRATE 2 PUFF: 160; 4.5 AEROSOL RESPIRATORY (INHALATION) at 08:57

## 2018-05-23 RX ADMIN — LORAZEPAM 0.5 MG: 0.5 TABLET ORAL at 12:50

## 2018-05-23 RX ADMIN — CARBAMAZEPINE 100 MG: 100 TABLET, EXTENDED RELEASE ORAL at 19:13

## 2018-05-23 RX ADMIN — BUDESONIDE AND FORMOTEROL FUMARATE DIHYDRATE 2 PUFF: 160; 4.5 AEROSOL RESPIRATORY (INHALATION) at 19:13

## 2018-05-23 RX ADMIN — HEPARIN SODIUM 5000 UNITS: 5000 INJECTION, SOLUTION INTRAVENOUS; SUBCUTANEOUS at 06:12

## 2018-05-23 RX ADMIN — TRAMADOL HYDROCHLORIDE 50 MG: 50 TABLET, FILM COATED ORAL at 09:01

## 2018-05-23 RX ADMIN — TRAMADOL HYDROCHLORIDE 50 MG: 50 TABLET, FILM COATED ORAL at 15:51

## 2018-05-23 RX ADMIN — MORPHINE SULFATE 1 MG: 2 INJECTION, SOLUTION INTRAMUSCULAR; INTRAVENOUS at 02:03

## 2018-05-23 RX ADMIN — ACETAMINOPHEN 650 MG: 325 TABLET ORAL at 03:34

## 2018-05-23 RX ADMIN — HALOPERIDOL 5 MG: 5 TABLET ORAL at 23:53

## 2018-05-23 RX ADMIN — HEPARIN SODIUM 5000 UNITS: 5000 INJECTION, SOLUTION INTRAVENOUS; SUBCUTANEOUS at 13:33

## 2018-05-23 RX ADMIN — LORAZEPAM 1 MG: 1 TABLET ORAL at 23:08

## 2018-05-23 RX ADMIN — TRAMADOL HYDROCHLORIDE 50 MG: 50 TABLET, FILM COATED ORAL at 21:04

## 2018-05-23 RX ADMIN — DULOXETINE 60 MG: 60 CAPSULE, DELAYED RELEASE ORAL at 08:57

## 2018-05-23 NOTE — DISCHARGE SUMMARY
Discharge Summary - [facility]    Patient Information: Esperanza Mckeon 50 y o  female MRN: [id number]  Unit/Bed#: E5 -01 Encounter: [id number]    Discharging Physician / Practitioner: Tera Whitlock MD  PCP: Anali Orozco DO  Admission Date: 5/20/2018  Discharge Date: 05/23/18    Reason for Admission:  Intentional lithium overdose    Discharge Diagnoses:  Suicide ideation, bipolar disorder    Principal Problem:    Lithium overdose  Active Problems:    Hypertension    Hyperlipidemia    Depression    COPD (chronic obstructive pulmonary disease) ([facility] )    Bipolar disorder ([facility] )    Suicidal behavior  Resolved Problems:    * No resolved hospital problems  *      Consultations During Hospital Stay:  · Psychiatry    Procedures Performed:     No results found  Significant Findings:     · Patient presented after an intentional drug overdose with lithium intending to hurt herself due to life stressors and being overwhelmed by her situation  She had been recently diagnosed with bipolar disorder but had access to lithium tablets that were well over a year old and took a handful of them presented with toxic lithium level  She was unsure of the dosage of the lithium she took or how many exact amount  She presented with muscle aches with stable hemodynamics  And creatinine normal  · Lithium level on presentation was 2 3/urine drug screen was negative  There is no indication for gastric decompression since it had been at least 4:18 p m  hours since ingestion of lithium  She freely admitted that this was an attempt to kill herself  · She was maintained on one-to-one observation status throughout the course of her admission  · She was admitted to the ICU and placed on vigorous IV hydration  · Only medical issue is resumption of antihypertensive lisinopril in the absence of any signs of acute kidney injury and she was placed back on 10 mg lisinopril in the state    · With further IV hydration lithium levels dropped to nontoxic range she was seen by the Psychiatry service who deemed her compatible for need for inpatient psychiatric facility although stating presently nonsuicidal intent she is overwhelmed by her situation and her recent diagnosis of bipolar disorder  She was restarted on previous dosage of Tegretol and Cymbalta previous dosage of Seroquel thus far held  She signed a 201 and is for discharge to a psych facility later tonight she is otherwise medically stable    Incidental Findings:   · Mild hypertension     Test Results Pending at Discharge (will require follow up): · None     Outpatient Tests Requested:  · None    Complications:  None    Hospital Course:     Shruthi Magallon is a 50 y o  female patient who originally presented to the hospital on 5/20/2018 due to intentional drug overdose  Please see above significant findings for hospital course and treatment plan    Condition at Discharge: good     Discharge Day Visit / Exam:     * Please refer to separate progress for these details *    Discharge instructions/Information to patient and family:   See after visit summary for information provided to patient and family  Provisions for Follow-Up Care:  See after visit summary for information related to follow-up care and any pertinent home health orders  Disposition:     Inpatient Psychiatry at [facility] to [address]:   · Not Applicable to this Patient - Not Applicable to this Patient      Discharge Statement:  I spent 56 minutes discharging the patient  This time was spent on the day of discharge  I had direct contact with the patient on the day of discharge  Greater than 50% of the total time was spent examining patient, answering all patient questions, arranging and discussing plan of care with patient as well as directly providing post-discharge instructions  Additional time then spent on discharge activities      Discharge Medications:  See after visit summary for reconciled discharge medications provided to patient and family  ** Please Note: Dragon 360 Dictation voice to text software may have been used in the creation of this document   **

## 2018-05-23 NOTE — PLAN OF CARE
COPING     Pt/Family able to verbalize concerns and demonstrate effective coping strategies Progressing     Will report anxiety at manageable levels Progressing        Depression - IP adult     Effects of depression will be minimized Progressing        Nutrition/Hydration-ADULT     Nutrient/Hydration intake appropriate for improving, restoring or maintaining nutritional needs Progressing        Potential for Falls     Patient will remain free of falls Progressing        Prexisting or High Potential for Compromised Skin Integrity     Skin integrity is maintained or improved Progressing        SELF HARM     Effect of psychiatric condition will be minimized and patient will be protected from self harm Progressing

## 2018-05-23 NOTE — CONSULTS
Psychiatric Evaluation/f/u - Behavioral Health       Assessment/Plan  Principal Problem:  F31 4 bipolar disorder severe depressed without psychotic feature  F10 20 alcohol use disorder severe  PLAN:   1) patient is sign a 201 for  2) continue one on one observation  3) Communicated with patients'communicated to patient's RN  4) transfer to inpatient psych once an appropriate bed is found patient medically stable  Interval history:  Patient is soft to med surge floor  Patient says that she feels a little better but she is going crazy sitting and not doing anything she is off her medication  Patient says that she wants to go to an inpatient psych now she has discussed with her   She has a lot of stress at outside is suspecting her she has mood swings she feels hopeless she is currently not suicidal but she knows that she needs help  She said that is been going on for a while she has a was recently diagnosed with bipolar and she does not know how to handle it  This writer also felt that she is high risk and needs inpatient psychiatric hospitalization  For a complete psychiatric evaluation feel evaluation done by this writer two days ago      Scheduled Meds:    Current Facility-Administered Medications:  EMS replenish medication  Does not apply Once Triage Protocol Emergency, MD   acetaminophen 650 mg Oral Q6H PRN BETHANY Erwin-C   budesonide-formoterol 2 puff Inhalation BID Annetta Montes PA-C   carBAMazepine 100 mg Oral BID Paola Cohen MD   DULoxetine 60 mg Oral Daily Paola Cohen MD   heparin (porcine) 5,000 Units Subcutaneous Q8H [address] Annetta Montes PA-C   ibuprofen 400 mg Oral Q6H PRN CARLITOS Pretty   LORazepam 0 5 mg Oral Q6H PRN Paola Cohen MD   nicotine 1 patch Transdermal Daily Annetta Montes PA-C   ondansetron 4 mg Intravenous Q6H PRN Annetta Montes PA-C   pantoprazole 40 mg Oral Early Morning Annetta Montes PA-C   traMADol 50 mg Oral Q6H PRN Filomena Small Adalberto Cuellar MD     Continuous Infusions:   PRN Meds: acetaminophen    ibuprofen    LORazepam    ondansetron    traMADol     Allergies   Allergen Reactions    Sulfa Antibiotics        Vitals:    05/23/18 0811   BP: 156/77   Pulse: 98   Resp: 20   Temp: 97 7 °F (36 5 °C)   SpO2: 95%             Mental Status Evaluation:  Appearance: The patient is calmer today  Behavior:  Much cooperated   Speech:   speech is normal goal directed    Mood:  Depressed   Affect Anxious  Language: naming objects and Goal directed  Thought Process:   logical and linear    Thought Content:  Normal   Perceptual Disturbances:  denying any auditory and visual hallucinations  Risk Potential: Currently denying suicidal ideas but says she needs help and she is afraid that she homes things will not get better  Sensorium:  person, place, time/date, situation, day of week, month of year and year   Cognition:  grossly intact   Consciousness:   alert, awake, and oriented ×3    Attention: Poor attention span   Intellect: Normal   Fund of Knowledge: awareness of current events: normal    Insight:  Improved insight   Judgment: Improved judgment   Muscle Strength and Tone: Normal    Gait/Station:  gait was not assessed    Motor Activity: Increased psychomotor activity     Lab Results: I have personally reviewed pertinent lab results  NOTE:  Total of 40 minutes were spent in talking to patient completing this medical record reviewing medical chart medical decision making    Houston Carter MD

## 2018-05-23 NOTE — PROGRESS NOTES
Orders placed by Gaby Chan PA-C for sign and held discharge readmit  BETHANY rabago to confirm orders, per her these orders are when bed is available  PA DCd Patients nicotine patch for NOW  PA made aware of this order, attending MD also made aware  Nicotine patch has been removed at this time, Per attending, new patch order will be placed, patient made aware of this  Will monitor

## 2018-05-24 PROBLEM — F41.1 GENERALIZED ANXIETY DISORDER: Status: ACTIVE | Noted: 2018-05-24

## 2018-05-24 LAB
AMORPH PHOS CRY URNS QL MICRO: ABNORMAL /HPF
BACTERIA UR QL AUTO: ABNORMAL /HPF
BASOPHILS # BLD AUTO: 0.03 THOUSANDS/ΜL (ref 0–0.1)
BASOPHILS NFR BLD AUTO: 0 % (ref 0–1)
BILIRUB UR QL STRIP: NEGATIVE
CHOLEST SERPL-MCNC: 222 MG/DL (ref 50–200)
CLARITY UR: ABNORMAL
COARSE GRAN CASTS URNS QL MICRO: ABNORMAL /LPF
COLOR UR: YELLOW
EOSINOPHIL # BLD AUTO: 0.05 THOUSAND/ΜL (ref 0–0.61)
EOSINOPHIL NFR BLD AUTO: 1 % (ref 0–6)
ERYTHROCYTE [DISTWIDTH] IN BLOOD BY AUTOMATED COUNT: 13.2 % (ref 11.6–15.1)
FOLATE SERPL-MCNC: 8.4 NG/ML (ref 3.1–17.5)
GLUCOSE UR STRIP-MCNC: NEGATIVE MG/DL
HCG SERPL QL: NEGATIVE
HCT VFR BLD AUTO: 41.8 % (ref 34.8–46.1)
HDLC SERPL-MCNC: 75 MG/DL (ref 40–60)
HGB BLD-MCNC: 13.6 G/DL (ref 11.5–15.4)
HGB UR QL STRIP.AUTO: ABNORMAL
KETONES UR STRIP-MCNC: NEGATIVE MG/DL
LDLC SERPL CALC-MCNC: 110 MG/DL (ref 0–100)
LEUKOCYTE ESTERASE UR QL STRIP: NEGATIVE
LYMPHOCYTES # BLD AUTO: 1.81 THOUSANDS/ΜL (ref 0.6–4.47)
LYMPHOCYTES NFR BLD AUTO: 22 % (ref 14–44)
MCH RBC QN AUTO: 35.3 PG (ref 26.8–34.3)
MCHC RBC AUTO-ENTMCNC: 32.5 G/DL (ref 31.4–37.4)
MCV RBC AUTO: 109 FL (ref 82–98)
MONOCYTES # BLD AUTO: 0.32 THOUSAND/ΜL (ref 0.17–1.22)
MONOCYTES NFR BLD AUTO: 4 % (ref 4–12)
MUCOUS THREADS UR QL AUTO: ABNORMAL
NEUTROPHILS # BLD AUTO: 5.87 THOUSANDS/ΜL (ref 1.85–7.62)
NEUTS SEG NFR BLD AUTO: 73 % (ref 43–75)
NITRITE UR QL STRIP: NEGATIVE
NON-SQ EPI CELLS URNS QL MICRO: ABNORMAL /HPF
NONHDLC SERPL-MCNC: 147 MG/DL
PH UR STRIP.AUTO: 7 [PH] (ref 4.5–8)
PLATELET # BLD AUTO: 243 THOUSANDS/UL (ref 149–390)
PMV BLD AUTO: 9.7 FL (ref 8.9–12.7)
PROT UR STRIP-MCNC: NEGATIVE MG/DL
RBC # BLD AUTO: 3.85 MILLION/UL (ref 3.81–5.12)
RBC #/AREA URNS AUTO: ABNORMAL /HPF
SP GR UR STRIP.AUTO: 1.01 (ref 1–1.03)
TRIGL SERPL-MCNC: 186 MG/DL
UROBILINOGEN UR QL STRIP.AUTO: 0.2 E.U./DL
VIT B12 SERPL-MCNC: 239 PG/ML (ref 100–900)
WBC # BLD AUTO: 8.08 THOUSAND/UL (ref 4.31–10.16)
WBC #/AREA URNS AUTO: ABNORMAL /HPF

## 2018-05-24 PROCEDURE — 99252 IP/OBS CONSLTJ NEW/EST SF 35: CPT | Performed by: PHYSICIAN ASSISTANT

## 2018-05-24 PROCEDURE — 81001 URINALYSIS AUTO W/SCOPE: CPT | Performed by: PHYSICIAN ASSISTANT

## 2018-05-24 PROCEDURE — 99223 1ST HOSP IP/OBS HIGH 75: CPT | Performed by: PSYCHIATRY & NEUROLOGY

## 2018-05-24 PROCEDURE — 85025 COMPLETE CBC W/AUTO DIFF WBC: CPT | Performed by: PHYSICIAN ASSISTANT

## 2018-05-24 PROCEDURE — 80061 LIPID PANEL: CPT | Performed by: PHYSICIAN ASSISTANT

## 2018-05-24 PROCEDURE — 84703 CHORIONIC GONADOTROPIN ASSAY: CPT | Performed by: PHYSICIAN ASSISTANT

## 2018-05-24 PROCEDURE — 82746 ASSAY OF FOLIC ACID SERUM: CPT | Performed by: PSYCHIATRY & NEUROLOGY

## 2018-05-24 PROCEDURE — 82607 VITAMIN B-12: CPT | Performed by: PSYCHIATRY & NEUROLOGY

## 2018-05-24 RX ORDER — AMLODIPINE BESYLATE 5 MG/1
5 TABLET ORAL DAILY
Status: DISCONTINUED | OUTPATIENT
Start: 2018-05-25 | End: 2018-05-27

## 2018-05-24 RX ORDER — LORAZEPAM 1 MG/1
2 TABLET ORAL EVERY 6 HOURS PRN
Status: DISCONTINUED | OUTPATIENT
Start: 2018-05-24 | End: 2018-05-24

## 2018-05-24 RX ORDER — LISINOPRIL 10 MG/1
10 TABLET ORAL ONCE
Status: COMPLETED | OUTPATIENT
Start: 2018-05-24 | End: 2018-05-24

## 2018-05-24 RX ORDER — LISINOPRIL 10 MG/1
10 TABLET ORAL DAILY
Status: DISCONTINUED | OUTPATIENT
Start: 2018-05-24 | End: 2018-05-24

## 2018-05-24 RX ORDER — PANTOPRAZOLE SODIUM 40 MG/1
40 TABLET, DELAYED RELEASE ORAL
Status: DISCONTINUED | OUTPATIENT
Start: 2018-05-24 | End: 2018-05-30 | Stop reason: HOSPADM

## 2018-05-24 RX ORDER — MIRTAZAPINE 15 MG/1
15 TABLET, FILM COATED ORAL
Status: DISCONTINUED | OUTPATIENT
Start: 2018-05-24 | End: 2018-05-30 | Stop reason: HOSPADM

## 2018-05-24 RX ORDER — LISINOPRIL 20 MG/1
20 TABLET ORAL DAILY
Status: DISCONTINUED | OUTPATIENT
Start: 2018-05-25 | End: 2018-05-30 | Stop reason: HOSPADM

## 2018-05-24 RX ORDER — LORAZEPAM 1 MG/1
1 TABLET ORAL EVERY 6 HOURS PRN
Status: DISCONTINUED | OUTPATIENT
Start: 2018-05-24 | End: 2018-05-24

## 2018-05-24 RX ORDER — CLONAZEPAM 1 MG/1
1 TABLET ORAL 2 TIMES DAILY
Status: DISCONTINUED | OUTPATIENT
Start: 2018-05-24 | End: 2018-05-30 | Stop reason: HOSPADM

## 2018-05-24 RX ORDER — TRAMADOL HYDROCHLORIDE 50 MG/1
50 TABLET ORAL EVERY 8 HOURS PRN
Status: DISCONTINUED | OUTPATIENT
Start: 2018-05-24 | End: 2018-05-25

## 2018-05-24 RX ORDER — VENLAFAXINE HYDROCHLORIDE 75 MG/1
75 CAPSULE, EXTENDED RELEASE ORAL DAILY
Status: DISCONTINUED | OUTPATIENT
Start: 2018-05-24 | End: 2018-05-30 | Stop reason: HOSPADM

## 2018-05-24 RX ORDER — ALPRAZOLAM 0.5 MG/1
1 TABLET ORAL EVERY 6 HOURS PRN
Status: DISCONTINUED | OUTPATIENT
Start: 2018-05-24 | End: 2018-05-30 | Stop reason: HOSPADM

## 2018-05-24 RX ADMIN — LISINOPRIL 10 MG: 10 TABLET ORAL at 11:10

## 2018-05-24 RX ADMIN — PANTOPRAZOLE SODIUM 40 MG: 40 TABLET, DELAYED RELEASE ORAL at 11:10

## 2018-05-24 RX ADMIN — DULOXETINE HYDROCHLORIDE 20 MG: 20 CAPSULE, DELAYED RELEASE ORAL at 08:53

## 2018-05-24 RX ADMIN — NICOTINE 1 PATCH: 14 PATCH TRANSDERMAL at 08:53

## 2018-05-24 RX ADMIN — VENLAFAXINE HYDROCHLORIDE 75 MG: 75 CAPSULE, EXTENDED RELEASE ORAL at 11:15

## 2018-05-24 RX ADMIN — MAGNESIUM HYDROXIDE 30 ML: 400 SUSPENSION ORAL at 11:15

## 2018-05-24 RX ADMIN — MIRTAZAPINE 15 MG: 15 TABLET, FILM COATED ORAL at 21:40

## 2018-05-24 RX ADMIN — CLONAZEPAM 1 MG: 1 TABLET ORAL at 17:11

## 2018-05-24 RX ADMIN — LISINOPRIL 10 MG: 10 TABLET ORAL at 21:39

## 2018-05-24 RX ADMIN — TRAMADOL HYDROCHLORIDE 50 MG: 50 TABLET, FILM COATED ORAL at 11:18

## 2018-05-24 RX ADMIN — CLONAZEPAM 1 MG: 1 TABLET ORAL at 11:15

## 2018-05-24 RX ADMIN — ALPRAZOLAM 1 MG: 0.5 TABLET ORAL at 19:49

## 2018-05-24 RX ADMIN — TRAMADOL HYDROCHLORIDE 50 MG: 50 TABLET, FILM COATED ORAL at 18:40

## 2018-05-24 NOTE — TREATMENT PLAN
TREATMENT PLAN REVIEW - 809 Aisha Gonsalves 50 y o  1970 female MRN: [id number]    6 [address] [place] [address] Room / Bed: [facility] 713-83 Encounter: [id number]          Admit Date/Time:  5/23/2018  9:54 PM    Treatment Team: Attending Provider: Shahida Ashford; Patient Care Technician: Danya Maria; Registered Nurse: Tor Mackey RN; Registered Nurse: Sherrie Garcia RN; Charge Nurse: Shahbaz Gayle RN; Occupational Therapy Assistant: Shani Rodriges, [address]; Medications RN: Shwetha Flowers RN; : Ariela Amador;  Patient Care Technician: Oral Duane; : Nati Slater    Diagnosis: Principal Problem:    Severe episode of recurrent major depressive disorder, without psychotic features ([facility] 75 )  Active Problems:    COPD (chronic obstructive pulmonary disease) ([facility] )    Generalized anxiety disorder    Patient Strengths/Assets: cooperative, compliant with medication, general fund of knowledge, good past treatment response, motivation for treatment/growth    Patient Barriers/Limitations: lack of financial means, low self esteem, noncompliant with medication    Short Term Goals: decrease in depressive symptoms, decrease in anxiety symptoms, decrease in suicidal thoughts    Long Term Goals: improvement in depression, improvement in anxiety, stabilization of mood, free of suicidal thoughts, acceptance of need for psychiatric medications, acceptance of need for psychiatric treatment, acceptance of need for psychiatric follow up after discharge    Progress Towards Goals: starting psychiatric medications as prescribed    Recommended Treatment: medication management, patient medication education, group therapy, milieu therapy, continued Behavioral Health psychiatric evaluation/assessment process    Treatment Frequency: daily medication monitoring, group and milieu therapy daily, monitoring through interdisciplinary rounds, monitoring through weekly patient care conferences    Expected Discharge Date: 5-7 days    Discharge Plan: referral for outpatient medication management with a psychiatrist, referral for outpatient psychotherapy    Treatment Plan Created/Updated By: Ashvin Diaz

## 2018-05-24 NOTE — DISCHARGE INSTR - OTHER ORDERS
You were provided with the following information, resources, and services throughout [facility]:     [address] including daybreak mental health, housing assistance, pathways referral, homelessness prevention, soup [place], pharmaceutical assistance, linkage street ministry and additional information added in the discharge folder  Lauren Maradaiga is a confidential 7 days/week telephone support service manned by trained mental health consumers  [employer] operates daily but is not able to accept calls between 2AM-6AM  [facility] provides support, a listening ear and can provide information about available services  [facility] specializes in the concerns of mental health consumers, their families and friends  However, we are also here for anyone who has a mental health concern, is confused about or just doesn't know anything about mental health or where to get information  To reach Lauren Maradiaga, call [phone] accepts calls between 6:00 AM to 10:00 AM and from 4:00 PM to 12:00 AM      Crisis Text Line is free, 24/7 support for those in crisis  Text 415799 from anywhere in the [place] to text with a trained Crisis Counselor  [facility] [place] ([facility]) AT [facility] - licensed telephone and mobile crisis services that provide mental health assessments to all age groups regardless of income or insurance  [facility] operates 24-hour/7 days a week  [address] [place] [address] assists consumer who are experiencing a mental health emergency and lack the resources to assist themselves  Immediate intervention for suicidal and depressed individuals with home visits/outreach being top priority  Crisis can be contacted at [phone]  Dial 2-1-1 to get connected/get help   Free, confidential information & referral available 24/7: Aging Services, Child & Youth Services, Counseling, Education/Training, Food/Shelter/Clothing, Health Services, Parenting, Substance Abuse, Support Groups, Volunteer Opportunities, & much more      Phone: 2-1-1 or [phone], Web: [email or web address], Email: [email or web address]

## 2018-05-24 NOTE — CASE MANAGEMENT
PT was referred to [address] on a 201 from [facility]ICU[facility]Med [facility]urg after PT had an intentional SA by OD on an unknown amount of lithium and alcohol  Per records, PT woke up and felt shaky and PT's daughter called 911 where PT was brought to the ED by EMS  CM met w/ PT today  CM reviewed PT's [place] plan  PT signed [address] [address] plan  Copy of [address] plan placed to be filed in medical records  PT confirms that she lives at [facility] [address] in [facility] w/ PT's  Ricki Olson and PT's daughter Husam Nur  PT reports that upon D/C from [facility], she is able to return to live there  PT reports that her telephone number is (c) [phone]  PT confirms having an active 's licenses and confirms having her own means of transportation  PT reports that upon D/C from [facility], PT's father will be able to provide transportation home  PT denies having any current or active providers including psychiatric medication management, therapy, community case management, peer supports, D&A services, etc  PT reports having a PCP through [facility]  PT reports that she is currently uninsured and reports that she applied for MA through [employer]  PT denies having active RX coverage and reports [facility] [address] in [place], [place] as a local pharmacy  CM provided education on [employer] as options for pharmacies for uninsured individuals as they are less costly than other local pharmacies  PT reports HX of (1) I/P [address] admission as an adolescent  Records indicate this admission was after PT had a SA by cutting her wrists  PT reports that this is her 1st I/P [address] admission as an adult resulting in this being PT's 2nd I/P [address] admission overall  PT reports HX of O/P [facility] [place] including psychiatric medication management w/ Dr Francisco Bryant and individual therapy w/ SIMONE Castro ,LPC,CAADC through [facility] and [address]   PT reports that she prefers to see Letitia Jacinto for individual therapy upon D/C from [facility]; however, cannot afford to since their insurance was termed  PT reports that they have $0 income at this time  PT denies any issues w/ drugs and alcohol  UDS upon admission was (-)  PT declines a referral for D&A [place]  PT confirms smoking cigarettes roughly 1-1 5 PPD  PT reports the following medical conditions: HTN, COPD, Herniated Disc, Chronic Knee Pain, and GERD  PT reports HX of pancrease issues  PT denies having a HX of seizures  PT denies having any past or current legal issues  PT denies currently being under the supervision of probation or parole  PT reports that she is currently unemployed and PT's  lost his job and their insurance benefits recently  PT reports that she and her family have applied for MA, SSI, and food stamps/ welfare, etc      PT reports that she is legally  w/ (1) daughter named Donna  PT reports the following family MH and D&A HX: PT's father struggled w/ alcoholism, PT's grandfather struggled w/ alcoholism, and PT's sister struggles w/ some sort of a substance abuse issue that PT is not aware of  PT denies experiencing any current forms of physical, verbal, emotional, or sexual abuse  PT reports that she identifies as Jew in regards to Gnosticist and spiritual beliefs  PT reports that her highest level of completed education is 1 year of college through [employer] in [place], [place]  PT denies having access to any weapons or fire arms  PT reports that her  does own a gun that is stored in their family home; however, PT reports that this gun is locked and secured and PT and PT's daughter do not even know where the gun is or have access to it  PT reports having natural supports w/ immediate family, neighbors, / daughter, etc      PT denies having a legal POA, legal guardian, mental health advanced directive, rep-payee       CM reviewed the process for [facility] walk-in services and [facility] funding for O/P [facility] until [employer] becomes active  PT verbalized understanding this  Agreed to be in contact regarding progression in stabilization and disposition planning       PT signed JENNY's for the following:     - [facility] located @ [address] [id number] (C) [facility] located @ [address] [facility] [place] [address] (H) [phone] (I) [phone]     [facility] located @ [address] [facility] [address] [facility] [address] (K) [phone] (Z) [phone]

## 2018-05-24 NOTE — PROGRESS NOTES
Progress Note - Tati Gonzalez 50 y o  female MRN: [id number]    Unit/Bed#: Rakesh Moe 270-06 Encounter: [id number]      Assessment/Plan:  1  Anxiety/bipolar disorder with suicide attempt  -Medically cleared for inpatient psychiatric evaluation and treatment    2  GERD  -Takes Prilosec and Zantac at home  -no Zantac on home med list - will restart if confirmed  -start Protonix     3  HTN  -Restart Lisinopril    Subjective:   Patient is a 201 from [facility] med[facility]surg after attempted suicide by overdosing on Lithium and ETOH  She reports she realized she made a mistake as soon she took the pills  She reports history of GERD with recent abdominal discomfort and voice changes, HTN treated with Lisinopril at home and anxiety  Objective:     Vitals: Blood pressure 137/86, pulse 97, temperature 97 6 °F (36 4 °C), temperature source Tympanic, resp  rate 18, height 5' 6" (1 676 m), weight 75 1 kg (165 lb 8 oz)  ,Body mass index is 26 71 kg/m²  No intake or output data in the 24 hours ending 05/24/18 0912    Physical Exam: General appearance: alert and oriented, in no acute distress  Eyes: PERRL, EOMI  Lungs: clear to auscultation bilaterally and without wheezes, crackles, or rhonchi  Heart: S1, S2 normal, no S3 or S4, no click, no rub and tachycardia  Abdomen: soft, non-tender; bowel sounds normal; no masses,  no organomegaly     Invasive Devices          No matching active lines, drains, or airways          Lab, Imaging and other studies: I have personally reviewed pertinent reports

## 2018-05-24 NOTE — CASE MANAGEMENT
CM received notification from [address] [place] [address] that PT is now active w/ 3 Aleena Hurd as on 5/22/18
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
## 2018-05-24 NOTE — PLAN OF CARE
Problem: Potential for Falls  Goal: Patient will remain free of falls  INTERVENTIONS:  - Assess patient frequently for physical needs  -  Identify cognitive and physical deficits and behaviors that affect risk of falls  -  [place] fall precautions as indicated by assessment   - Educate patient/family on patient safety including physical limitations  - Instruct patient to call for assistance with activity based on assessment  - Modify environment to reduce risk of injury  - Consider OT/PT consult to assist with strengthening/mobility   Outcome: Completed Date Met: 05/23/18      Problem: Prexisting or High Potential for Compromised Skin Integrity  Goal: Skin integrity is maintained or improved  INTERVENTIONS:  - Identify patients at risk for skin breakdown  - Assess and monitor skin integrity  - Assess and monitor nutrition and hydration status  - Monitor labs (i e  albumin)  - Assess for incontinence   - Turn and reposition patient  - Assist with mobility/ambulation  - Relieve pressure over bony prominences  - Avoid friction and shearing  - Provide appropriate hygiene as needed including keeping skin clean and dry  - Evaluate need for skin moisturizer/barrier cream  - Collaborate with interdisciplinary team (i e  Nutrition, Rehabilitation, etc )   - Patient/family teaching   Outcome: Completed Date Met: 05/23/18      Problem: Nutrition/Hydration-ADULT  Goal: Nutrient/Hydration intake appropriate for improving, restoring or maintaining nutritional needs  Monitor and assess patient's nutrition/hydration status for malnutrition (ex- brittle hair, bruises, dry skin, pale skin and conjunctiva, muscle wasting, smooth red tongue, and disorientation)  Collaborate with interdisciplinary team and initiate plan and interventions as ordered  Monitor patient's weight and dietary intake as ordered or per policy  Utilize nutrition screening tool and intervene per policy   Determine patient's food preferences and provide high-protein, high-caloric foods as appropriate       INTERVENTIONS:  - Monitor oral intake, urinary output, labs, and treatment plans  - Assess nutrition and hydration status and recommend course of action  - Evaluate amount of meals eaten  - Assist patient with eating if necessary   - Allow adequate time for meals  - Recommend/ encourage appropriate diets, oral nutritional supplements, and vitamin/mineral supplements  - Order, calculate, and assess calorie counts as needed  - Recommend, monitor, and adjust tube feedings and TPN/PPN based on assessed needs  - Assess need for intravenous fluids  - Provide specific nutrition/hydration education as appropriate  - Include patient/family/caregiver in decisions related to nutrition   Outcome: Completed Date Met: 05/23/18      Problem: COPING  Goal: Pt/Family able to verbalize concerns and demonstrate effective coping strategies  INTERVENTIONS:  - Assist patient/family to identify coping skills, available support systems and cultural and spiritual values  - Provide emotional support, including active listening and acknowledgement of concerns of patient and caregivers  - Reduce environmental stimuli, as able  - Provide patient education  - Assess for spiritual pain/suffering and initiate spiritual care, including notification of Pastoral Care or maynor based community as needed  - Assess effectiveness of coping strategies   Outcome: Completed Date Met: 05/23/18    Goal: Will report anxiety at manageable levels  INTERVENTIONS:  - Administer medication as ordered  - Teach and encourage coping skills  - Provide emotional support  - Assess patient/family for anxiety and ability to cope   Outcome: Completed Date Met: 05/23/18      Problem: Depression - IP adult  Goal: Effects of depression will be minimized  INTERVENTIONS:  - Assess impact of patient's symptoms on level of functioning, self-care needs and offer support as indicated  - Assess patient/family knowledge of depression, impact on illness and need for teaching  - Provide emotional support, presence and reassurance  - Assess for possible suicidal thoughts, ideation or self-harm  If patient expresses suicidal thoughts or statements do not leave alone, notify physician/AP immediately, initiate Suicide Precautions, and determine need for continual observation   - Initiate consults and referrals as appropriate (a mental health professional, Spiritual Care)  - Administer medication as ordered   Outcome: Completed Date Met: 05/23/18      Problem: SELF HARM  Goal: Effect of psychiatric condition will be minimized and patient will be protected from self harm  INTERVENTIONS:  - Assess impact of patient's symptoms on level of functioning, self-care needs and offer support as indicated  - Assess patient/family knowledge of depression, impact on illness and need for teaching  - Provide emotional support, presence and reassurance  - Assess for possible suicidal thoughts, ideation or self-harm  If patient expresses suicidal thoughts or statements do not leave alone, notify physician/AP immediately, initiate suicide precautions, and determine need for continual observation    - initiate consults and referrals as appropriate (a mental health professional, Spiritual Care   Outcome: Completed Date Met: 05/23/18

## 2018-05-24 NOTE — PROGRESS NOTES
Pt was extremely anxious this am, shaky, tearful, declined offer of prn ativan stating it bothers her stomach and she takes xanax typically  Pt states she could "hang in there until I see the doctor"  Pt restarted on home med's after seeing Taylor Friendly  Pt reports good relief from anxiety with scheduled klonopin  No tremors noted  Pt states she no longer has suicidal urges and contracted for safety on unit  Attended groups  States her  is visiting tonight and bringing her clothes  Admits to feeling overwhelmed by financial stressors  Less tearful as day went on  Receptive to support from staff

## 2018-05-24 NOTE — PROGRESS NOTES
Pt is a 201 from [facility] surg/ICU unit  Pt was admitted 5/20 due to overdosing on lithium and alcohol  Pt states having family stressors, not getting along with daughter  Arguments with  and  recently lost his job  Pt states she was feeling overwhelmed so took bottle of lithium  Pt unsure of amount  Pt states she fell asleep however when she woke up she was shaking and unsteady so daughter called 911  Pt continues to report elevated anxiety and depression  SI with no plan  Verbally contracts for safety  States "I regret I did it as soon as I took the pills  I am a Druze person and know I shouldn't hurt myself " pt denies HI, AH/VH  States having a hx of cutting however has not done so in years  Last inpatient admission was as a teenager when she cut wrists  Pt lives with  and daughter  States  has been communicating with pt since admission and willing to work on stressors  Medical hx of HTN, COPD, herniated disc, chronic right knee pain  Denies hx of seizures or recent falls  Pt denies drug use, smokes 1PPD cigarettes  Pt remains inconsistent with alcohol intake  Initially pt states drinking one beer "Every now and then" however unsure of last drink  Pt then states drinking up to six beers on the weekend "or when I am anxious " Pt states it has been " a long while" since last drink however per medical notes, pt had overdosed on lithium and alcohol  AUDIT given to patient to complete  Denies s/s of w/d at this time  States "I get the shakes sometimes but its from anxiety " Reviewed unit rules and behavioral expectations with patient  Will continue to monitor

## 2018-05-24 NOTE — TREATMENT PLAN
RN will meet with pt at least twice per day to assess for any concerns, teach about prescribed medications and diagnosis  Pt will be taught and encouraged to utilize healthy coping skills

## 2018-05-24 NOTE — PROGRESS NOTES
Pt received ativan 1 mg at 2308  Pt appeared to be resting until this time  However, pt came out to the nurse's station reporting extreme anxiety  Pt tearful, visibly tremulous, reporting she was sweating (did not appear diaphoretic) and reporting feeling nauseous  Pt stating that she has these symptoms at home due to anxiety  Pt given prn haldol 5 mg PO at 2353 for extreme anxiety  Pt reported ativan was ineffective  Pt stating how when she was on the medical unit they were titrating her meds and she is supposed to be on xanex three times a day  Pt brought to the quiet room with a sound machine  Will continue to monitor

## 2018-05-24 NOTE — PROGRESS NOTES
Patient transferred to [place] [address]  Paperwork sent with transport, and report called to RN Petra of transfer   All needs attended to

## 2018-05-24 NOTE — PROGRESS NOTES
Pt received haldol 5 mg PO at 2353 for extreme anxiety  Pt appeared to fall asleep within the hour of receiving medication  Pt awakened around 0300 c/o severe anxiety  Pt once again visibly tremulous, hyperventiliating and slightly diaphoretic  RN spoke with patient for some time trying to encourage pt to use other coping skills and orient her to the unit  /97   Pt focused on how she her medicine was decreased and changed in med surg  Pt able to slightly calm herself down by talking with the RN  Allowed pt to shower to help aid her anxiety  Pt returned to her room and appears to be sleeping at this time  Spoke to Dr Nereida Fisher of insight to review pt's prn ativan order due to pt's extreme anxiety and possibility of w/d  Will continue to monitor

## 2018-05-25 ENCOUNTER — APPOINTMENT (INPATIENT)
Dept: RADIOLOGY | Facility: HOSPITAL | Age: 48
DRG: 751 | End: 2018-05-25
Payer: COMMERCIAL

## 2018-05-25 PROBLEM — M25.561 RIGHT KNEE PAIN: Status: ACTIVE | Noted: 2018-05-25

## 2018-05-25 PROCEDURE — 99254 IP/OBS CNSLTJ NEW/EST MOD 60: CPT | Performed by: ORTHOPAEDIC SURGERY

## 2018-05-25 PROCEDURE — 73562 X-RAY EXAM OF KNEE 3: CPT

## 2018-05-25 PROCEDURE — 99232 SBSQ HOSP IP/OBS MODERATE 35: CPT | Performed by: PSYCHIATRY & NEUROLOGY

## 2018-05-25 RX ORDER — TRAMADOL HYDROCHLORIDE 50 MG/1
100 TABLET ORAL EVERY 8 HOURS PRN
Status: DISCONTINUED | OUTPATIENT
Start: 2018-05-25 | End: 2018-05-29

## 2018-05-25 RX ADMIN — MIRTAZAPINE 15 MG: 15 TABLET, FILM COATED ORAL at 21:20

## 2018-05-25 RX ADMIN — ALPRAZOLAM 1 MG: 0.5 TABLET ORAL at 20:05

## 2018-05-25 RX ADMIN — TRAMADOL HYDROCHLORIDE 100 MG: 50 TABLET, FILM COATED ORAL at 12:17

## 2018-05-25 RX ADMIN — AMLODIPINE BESYLATE 5 MG: 5 TABLET ORAL at 08:14

## 2018-05-25 RX ADMIN — VENLAFAXINE HYDROCHLORIDE 75 MG: 75 CAPSULE, EXTENDED RELEASE ORAL at 08:14

## 2018-05-25 RX ADMIN — NICOTINE 1 PATCH: 14 PATCH TRANSDERMAL at 08:14

## 2018-05-25 RX ADMIN — LISINOPRIL 20 MG: 20 TABLET ORAL at 08:14

## 2018-05-25 RX ADMIN — CLONAZEPAM 1 MG: 1 TABLET ORAL at 16:58

## 2018-05-25 RX ADMIN — PANTOPRAZOLE SODIUM 40 MG: 40 TABLET, DELAYED RELEASE ORAL at 06:42

## 2018-05-25 RX ADMIN — TRAMADOL HYDROCHLORIDE 50 MG: 50 TABLET, FILM COATED ORAL at 08:15

## 2018-05-25 RX ADMIN — CLONAZEPAM 1 MG: 1 TABLET ORAL at 08:14

## 2018-05-25 NOTE — CASE MANAGEMENT
CM outreached to PT's - [address] located @ [address] [place] [address] C) [phone] and provided an update on PT's diagnosis, medications, and current symptom presentation  CM discussed projected D/C planning for PT mid-late next week depending on PT's progression in stabilization and disposition planning  CM reviewed that per [facility], PT's Medical Assistance, behavioral health coverage is now active; therefore, PT is eligible to be set up w/ aftercare services  Ricki reports that he and PT are under a lot of pressure w/ finances  Ricki reports that he did find that PT left a suicide note, that Ricki plans to bring in for the treatment team to review  Per this, Ricki feels that there are three main stressors resulting in PT's SA  Ricki reports that there were some martial stressors that PT believed that Ricki and PT were going to get a divorce  Ricki denies that they were going to get a divorce; however, did acknowledge that there were some stressors  Ricki also reports that PT's parents are triggers for PT and described them, "as odd "     Ricki believes that this was an isolated incident and he truly believes that this was not attention seeking and he believed that PT truly wanted to die  Ricki also reports that PT struggles w/ their daughter  Ricki reports that PT has a HX of drinking and, "mixing pills " Ricki reports that over the years, PT's daughter has found PT 2-3x where PT was drinking alcohol while consuming pills  Ricki reports that PT and their daughter have struggled w/ their relationship and PT's daughter tends to avoid PT and PT becomes frustrated w/ this  Ricki reports that sometimes he feels that he gets in the middle of their lack of communication       Ricki reports that between PT's beliefs that she and Ricki were getting a divorce, PT's beliefs that their daughter hates PT, and the families struggle w/ finances right now, Ricki believes that these were all factors that resulted in PT's SA Robison stated that PT has a HX of, "If things were good, she would almost need to create chaos "     Ricki reports that he has been visiting PT daily and feels that she is doing better than prior to her hospitalization  Discussed recommendations for PT to step down to PHP upon D/C from I/P U  Discussed and also recommended family therapy to work through short-term and long-term goals in treatment  Provided information and supports on [facility] and support groups  Ricki confirmed that they do have a gun in the house; however, it is locked and secured and PT doesn't have access to it  Discussed safety w/ gun along w/ medication safety planning w/ lock box and additional support from family which Ricki was in support of      Ricki reports that he is extremely supportive of PT and PT has a lot of support w/ Ricki and natural supports  Agreed to be in contact regarding progression in stabilization and D/C planning

## 2018-05-25 NOTE — PROGRESS NOTES
Pt systolic bp high 511-771 systolic range  Had just gotten tramdol an hour earlier for pain  Gave PRN Xanax hoping it would relax her  Hour later bp systolic 644/061  Called NP and she ordered one time Lisinopril 10 mg  Pt came back less than and hour after and bp systolic 917  Asked her to lay back down, she has no medicine for sleeping such as Ambien or Trazodone  Pt did fall asleep and was checked at midnight for follow up bp  Bp at that time was 141/80  Returned to sleep

## 2018-05-25 NOTE — PROGRESS NOTES
Pt pleasant during interaction  Pt requested list of her medications  Pt was provided with this  Verbally reviewed meds  Pt requesting frequency of tramadol to be changed and was encouraged to discuss with physician  Pt also reports having IBS and c/o loose stool yesterday  Denies loose stool today but requesting imodium in case needed again  Pt reports struggling with anxiety and depression  Currently denies SI and contracts for safety  Pt discussed stressors leading to admission  Pt reports that things with her  have improved but other stressors still remain  Pt concerned with her elevated BP last evening  Reviewed changes with bp meds  Pt denies any other concerns presently

## 2018-05-25 NOTE — PLAN OF CARE
Problem: SELF HARM/SUICIDALITY  Goal: Will have no self-injury during hospital stay  INTERVENTIONS:  - Q 15 MINUTES: Routine safety checks  - Q WAKING SHIFT & PRN: Assess risk to determine if routine checks are adequate to maintain patient safety  - Encourage patient to participate actively in care by formulating a plan to combat response to suicidal ideation, identify supports and resources   Outcome: Progressing      Problem: DEPRESSION  Goal: Will be euthymic at discharge  INTERVENTIONS:  - Administer medication as ordered  - Provide emotional support via 1:1 interaction with staff  - Encourage involvement in milieu/groups/activities  - Monitor for social isolation   Outcome: Progressing      Problem: ANXIETY  Goal: Will report anxiety at manageable levels  INTERVENTIONS:  - Administer medication as ordered  - Teach and encourage coping skills  - Provide emotional support  - Assess patient/family for anxiety and ability to cope   Outcome: Progressing    Goal: By discharge: Patient will verbalize 2 strategies to deal with anxiety  Interventions:  - Identify any obvious source/trigger to anxiety  - Staff will assist patient in applying identified coping technique/skills  - Encourage attendance of scheduled groups and activities   Outcome: Progressing      Problem: DISCHARGE PLANNING  Goal: Discharge to home or other facility with appropriate resources  INTERVENTIONS:  - Identify barriers to discharge w/patient and caregiver  - Arrange for needed discharge resources and transportation as appropriate  - Identify discharge learning needs (meds, wound care, etc )  - Arrange for interpretive services to assist at discharge as needed  - Refer to Case Management Department for coordinating discharge planning if the patient needs post-hospital services based on physician/advanced practitioner order or complex needs related to functional status, cognitive ability, or social support system   Outcome: Progressing      Problem: Nutrition/Hydration-ADULT  Goal: Nutrient/Hydration intake appropriate for improving, restoring or maintaining nutritional needs  Monitor and assess patient's nutrition/hydration status for malnutrition (ex- brittle hair, bruises, dry skin, pale skin and conjunctiva, muscle wasting, smooth red tongue, and disorientation)  Collaborate with interdisciplinary team and initiate plan and interventions as ordered  Monitor patient's weight and dietary intake as ordered or per policy  Utilize nutrition screening tool and intervene per policy  Determine patient's food preferences and provide high-protein, high-caloric foods as appropriate       INTERVENTIONS:  - Monitor oral intake, urinary output, labs, and treatment plans  - Assess nutrition and hydration status and recommend course of action  - Evaluate amount of meals eaten  - Assist patient with eating if necessary   - Allow adequate time for meals  - Recommend/ encourage appropriate diets, oral nutritional supplements, and vitamin/mineral supplements  - Order, calculate, and assess calorie counts as needed  - Recommend, monitor, and adjust tube feedings and TPN/PPN based on assessed needs  - Assess need for intravenous fluids  - Provide specific nutrition/hydration education as appropriate  - Include patient/family/caregiver in decisions related to nutrition   Outcome: Progressing      Problem: Ineffective Coping  Goal: Participates in unit activities  Interventions:  - Provide therapeutic environment   - Provide required programming   - Redirect inappropriate behaviors    Outcome: Progressing

## 2018-05-25 NOTE — CASE MANAGEMENT
CM outreached to [facility] [id number], [place], [address] (x) [phone] and left a voice-mail requesting a call back regarding whether or not they are in-network / [facility]

## 2018-05-25 NOTE — CONSULTS
Consultation - Orthopedics   Sarina Garcia 50 y o  female MRN: [id number]  Unit/Bed#: [facility] 244-33 Encounter: [id number]      Assessment/Plan     Assessment:  Right knee pain, possible medial meniscus tear    Plan:  1  Recommend further workup as outpatient including MRI of right knee to further assess the joint for medial meniscus tear  2  Continue ice and NSAIDS  3  Gentle ROM of knee  4  Avoid high impact activities and squatting  No other restrictions  5  Follow up with local orthopedic surgeon after discharge  History of Present Illness   Physician Requesting Consult: Lor Lisa  Reason for Consult / Principal Problem: right knee pain  HPI: Sarina Garcia is a 50y o  year old female who presents with right knee pain after suffering a fall down some stairs in her home about 6 months ago  She had immediate pain after the fall, but was able to ambulate  She followed up with her orthopedic surgeon who obtained x-rays and recommended a MRI of the right knee  She is unable to get the MRI since her health insurance was terminated  She continues to have sharp pain over the posterior and medial aspect of the knee  The pain is worse with squatting  She denies any previous injury to knee  Inpatient consult to Orthopedic Surgery  Consult performed by: Orlando Gonsalves  Consult ordered by: Astrid Nash          Review of Systems   Constitutional: Negative  HENT: Negative  Eyes: Negative  Respiratory: Negative  Cardiovascular: Negative  Gastrointestinal: Negative  Endocrine: Negative  Genitourinary: Negative  Musculoskeletal: Positive for arthralgias  Skin: Negative  Allergic/Immunologic: Negative  Hematological: Negative  Psychiatric/Behavioral: Positive for suicidal ideas         Historical Information   Past Medical History:   Diagnosis Date    Anxiety     Bipolar disorder ([facility] [address] )     Chronic pain disorder     [facility]     Depression     Hyperlipidemia     Hypertension     Psychiatric disorder     Self-injurious behavior     Sleep difficulties     Suicide attempt [facility])      Past Surgical History:   Procedure Laterality Date     SECTION      PANCREAS SURGERY      "pseudocysts" per patient's  Ricki Desai ESOPHAGOGASTRODUODENOSCOPY TRANSORAL DIAGNOSTIC N/A 4/10/2018    Procedure: EGD AND COLONOSCOPY;  Surgeon: Austen Mixon MD;  [place] [facility]; Service: Gastroenterology     Social History   History   Alcohol Use    Yes     Comment: vodka and beer     History   Drug Use No     History   Smoking Status    Current Every Day Smoker    Packs/day: 1 00    Types: Cigarettes   Smokeless Tobacco    Current User     Family History: non-contributory    Meds/Allergies   all current active meds have been reviewed  Allergies   Allergen Reactions    Sulfa Antibiotics        Objective   Vitals: Blood pressure 153/96, pulse (!) 108, temperature 98 4 °F (36 9 °C), temperature source Tympanic, resp  rate 20, height 5' 6" (1 676 m), weight 75 1 kg (165 lb 8 oz)  ,Body mass index is 26 71 kg/m²  No intake or output data in the 24 hours ending 18  No intake/output data recorded  Invasive Devices          No matching active lines, drains, or airways          Physical Exam   Constitutional: She is oriented to person, place, and time  She appears well-developed  HENT:   Head: Normocephalic and atraumatic  Eyes: EOM are normal  Pupils are equal, round, and reactive to light  Neck: Neck supple  Cardiovascular: Intact distal pulses  Pulmonary/Chest: Effort normal and breath sounds normal  No respiratory distress  Abdominal: Soft  There is no tenderness  Musculoskeletal: She exhibits tenderness  She exhibits no edema or deformity  Right knee: She exhibits no effusion  Neurological: She is alert and oriented to person, place, and time  Skin: Skin is warm     Psychiatric: She has a normal mood and affect  Nursing note and vitals reviewed  Right Knee Exam     Tenderness   The patient is experiencing tenderness in the medial joint line  Range of Motion   The patient has normal right knee ROM  Muscle Strength     The patient has normal right knee strength  Tests   Pieter:  Medial - positive Lateral - negative  Varus: negative  Valgus: negative    Other   Erythema: absent  Scars: absent  Sensation: normal  Pulse: present  Swelling: none  Other tests: no effusion present      Left Knee Exam   Left knee exam is normal             Lab Results: I have personally reviewed pertinent lab results  Imaging Studies: I have personally reviewed pertinent films in PACS  X-rays of the right knee reveal mild patellofemoral degenerative changes  Code Status: Level 1 - Full Code  Advance Directive and Living Will:      Power of :    POLST:      Counseling / Coordination of Care  Total floor / unit time spent today 20 minutes  Greater than 50% of total time was spent with the patient and / or family counseling and / or coordination of care

## 2018-05-25 NOTE — PROGRESS NOTES
Progress Note - [address] [facility] 50 y o  female MRN: [id number]  Unit/Bed#: Rakesh Moe 338-51 Encounter: [id number]    Assessment/Plan   Principal Problem:    Severe episode of recurrent major depressive disorder, without psychotic features ([facility] )  Active Problems:    COPD (chronic obstructive pulmonary disease) ([facility] )    Generalized anxiety disorder      Subjective:  Patient is compliant with medications with no acute side effects  Patient is tolerating recent addition of Effexor and mirtazapine with reported slow improvement in sleep and anxiety  Patient continues to express depression with passive death wishes, but feels safe on the unit and is consenting for safety on the unit  No manic symptoms noted during evaluation today  Again discussed the rule out diagnosis of bipolar if manic symptoms are noted  Patient reports feeling little more energy today and her  noted this when she was talking to him on phone  Patient continues to express right knee swelling and pain for last 3 weeks, and is having difficulty putting weight on right leg  Agreed with increasing tramadol and getting orthopedic consultation for evaluation  Discussed the option of step-down to partial outpatient follow-up when stable  Patient is in agreement with this planning      Current Medications:    Current Facility-Administered Medications:  ALPRAZolam 1 mg Oral Q6H PRN Reggie Matthew   aluminum-magnesium hydroxide-simethicone 30 mL Oral Q4H PRN Sammi Murray PA-C   amLODIPine 5 mg Oral Daily CARLITOS Bardales   benztropine 1 mg Intramuscular Q6H PRN Sammi Murray PA-C   benztropine 1 mg Oral Q6H PRN Sammi Murray PA-C   clonazePAM 1 mg Oral BID Reggie Matthew   haloperidol 5 mg Oral Q8H PRN Sammi Murray PA-C   haloperidol lactate 5 mg Intramuscular Q6H PRN Sammi Murray PA-C   lisinopril 20 mg Oral Daily CARLITOS Bardales   LORazepam 1 mg Intramuscular Q8H PRN [employer], BINA   magnesium hydroxide 30 mL Oral Daily PRN Sammi Murray PA-C   mirtazapine 15 mg Oral HS [facility] Matthew   nicotine 1 patch Transdermal Daily Sammi Murray PA-C   OLANZapine 5 mg Intramuscular Q3H PRN Sammi Murray PA-C   OLANZapine 5 mg Oral Q3H PRN Sammi Murray PA-C   pantoprazole 40 mg Oral Early Morning Ester Figueroa PA-C   traMADol 100 mg Oral Q8H PRN [facility]   venlafaxine 75 mg Oral Daily [facility]       Behavioral Health Medications: all current active meds have been reviewed  Vital signs in last 24 hours:  Temp:  [97 5 °F (36 4 °C)-98 5 °F (36 9 °C)] 98 5 °F (36 9 °C)  HR:  [] 122  Resp:  [16-20] 20  BP: (133-186)/() 133/90    Laboratory results:    I have personally reviewed all pertinent laboratory/tests results    Labs in last 72 hours:   Recent Labs      05/24/18   0646   WBC  8 08   RBC  3 85   HGB  13 6   HCT  41 8   PLT  243   RDW  13 2   NEUTROABS  5 87   CHOL  222*   HDL  75*   TRIG  186*   LDLCALC  110*   PREGSERUM  Negative     Admission Labs:   Admission on 05/23/2018   Component Date Value    WBC 05/24/2018 8 08     RBC 05/24/2018 3 85     Hemoglobin 05/24/2018 13 6     Hematocrit 05/24/2018 41 8     MCV 05/24/2018 109*    MCH 05/24/2018 35 3*    MCHC 05/24/2018 32 5     RDW 05/24/2018 13 2     MPV 05/24/2018 9 7     Platelets 53/23/6294 243     Neutrophils Relative 05/24/2018 73     Lymphocytes Relative 05/24/2018 22     Monocytes Relative 05/24/2018 4     Eosinophils Relative 05/24/2018 1     Basophils Relative 05/24/2018 0     Neutrophils Absolute 05/24/2018 5 87     Lymphocytes Absolute 05/24/2018 1 81     Monocytes Absolute 05/24/2018 0 32     Eosinophils Absolute 05/24/2018 0 05     Basophils Absolute 05/24/2018 0 03     Preg, Serum 05/24/2018 Negative     Cholesterol 05/24/2018 222*    Triglycerides 05/24/2018 186*    HDL, Direct 05/24/2018 75*    LDL Calculated 05/24/2018 110*    Non-HDL-Chol (CHOL-HDL) 05/24/2018 147     Color, UA 05/24/2018 Yellow     Clarity, UA 05/24/2018 Slightly Cloudy     Specific Gravity, UA 05/24/2018 1 010     pH, UA 05/24/2018 7 0     Leukocytes, UA 05/24/2018 Negative     Nitrite, UA 05/24/2018 Negative     Protein, UA 05/24/2018 Negative     Glucose, UA 05/24/2018 Negative     Ketones, UA 05/24/2018 Negative     Urobilinogen, UA 05/24/2018 0 2     Bilirubin, UA 05/24/2018 Negative     Blood, UA 05/24/2018 Trace-Intact*    Folate 05/24/2018 8 4     Vitamin B-12 05/24/2018 239     RBC, UA 05/24/2018 0-1*    WBC, UA 05/24/2018 0-1*    Epithelial Cells 05/24/2018 Moderate*    Bacteria, UA 05/24/2018 Occasional     COARSE GRANULAR CASTS 05/24/2018 0-1     AMORPH PHOSPATES 05/24/2018 Moderate     MUCOUS THREADS 05/24/2018 Occasional*       Psychiatric Review of Systems:  Behavior over the last 24 hours:  Slow improvement  Sleep: normal  Appetite: normal  Medication side effects: No  ROS: right knee swelling and pain    Mental Status Evaluation:  Appearance:  casually dressed   Behavior:  guarded   Speech:  soft   Mood:  angry, anxious and depressed   Affect:  constricted   Language naming objects and repeating phrases   Thought Process:  circumstantial   Thought Content:  obsessions   Perceptual Disturbances: None   Risk Potential: Suicidal Ideations without plan, Homicidal Ideations none and Potential for Aggression No   Sensorium:  person and place   Cognition:  grossly intact   Consciousness:  awake    Attention: attention span appeared shorter than expected for age   Insight:  limited   Judgment: limited   Intellect fair   Gait/Station: normal gait/station   Motor Activity: no abnormal movements     Progress Toward Goals: slow progress    Recommended Treatment:   Increase tramadol to 100 mg q 8 hours for pain management  Discussed the risk of serotonin syndrome  After weighing risk and benefit patient agreed with increase in this dose    Her underlying pain might also be contributing to hypertension and tachycardia  Will follow closely  Continue current antidepressant dosage as no manic symptoms were noted  Consult Orthopedics: right knee pain and swelling for 3 weeks  difficulty wearng pain and radiating pain down to feet  Tramadol minimally effective  consult for management recommendations  Continue with group therapy, milieu therapy and occupational therapy  Continue following current medications:   Current Facility-Administered Medications:  ALPRAZolam 1 mg Oral Q6H PRN Reggie Matthew   aluminum-magnesium hydroxide-simethicone 30 mL Oral Q4H PRN Sammi Murray, PA-C   amLODIPine 5 mg Oral Daily CARLITOS Bardales   benztropine 1 mg Intramuscular Q6H PRN Sammi Murray, PA-C   benztropine 1 mg Oral Q6H PRN Sammi Murray, PA-C   clonazePAM 1 mg Oral BID Reggie Matthew   haloperidol 5 mg Oral Q8H PRN Sammi Alexandrante, PA-C   haloperidol lactate 5 mg Intramuscular Q6H PRN Sammi Murray, PA-C   lisinopril 20 mg Oral Daily CARLITOS Bardales   LORazepam 1 mg Intramuscular Q8H PRN Sammi Murray, PA-C   magnesium hydroxide 30 mL Oral Daily PRN Sammi uMrray, PA-C   mirtazapine 15 mg Oral HS Reggie Matthew   nicotine 1 patch Transdermal Daily Sammi Murray, PA-C   OLANZapine 5 mg Intramuscular Q3H PRN Sammi Murray, PA-C   OLANZapine 5 mg Oral Q3H PRN Sammi Murray, PA-C   pantoprazole 40 mg Oral Early Morning Ester Figueroa PA-C   traMADol 100 mg Oral Q8H PRN Reggie Matthew   venlafaxine 75 mg Oral Daily Reggie Matthew       Risks, benefits and possible side effects of Medications:   Risks, benefits, and possible side effects of medications explained to patient and patient verbalizes understanding  Risks of medications in pregnancy explained if female patient  Patient verbalizes understanding and agrees to notify her doctor if she becomes pregnant        This note has been constructed using a voice recognition system  There may be translation, syntax,  or grammatical errors  If you have any questions, please contact the dictating provider

## 2018-05-25 NOTE — CASE MANAGEMENT
CM met w/ PT today  CM provided PT w/ information on [employer] and reviewed this services  PT agrees to review the information on [employer] and consider this as a D/C option  CM also reviewed that per UR, PT's insurance is not active as of 5/23/18 w/ [employer]  Discussed PT's medical insurance most likely becoming active the 1st of the month if it is not already active  CM reviewed that CM outreached to PT's previous therapist's office to see if he was in-network w/ [employer] [place]  PT verbalized being in agreement w/ this planning and thankful in regards to her insurance  Agreed to be in contact regarding progression in stabilization and disposition planning

## 2018-05-25 NOTE — PROGRESS NOTES
5 New order received for increase in Ultram Q 8hr  Dr Roseann Storey for pt to have new dose at this time  Will monitor

## 2018-05-26 PROCEDURE — 99232 SBSQ HOSP IP/OBS MODERATE 35: CPT | Performed by: PSYCHIATRY & NEUROLOGY

## 2018-05-26 RX ORDER — NAPROXEN 250 MG/1
375 TABLET ORAL EVERY 12 HOURS PRN
Status: DISCONTINUED | OUTPATIENT
Start: 2018-05-26 | End: 2018-05-26

## 2018-05-26 RX ORDER — GABAPENTIN 300 MG/1
300 CAPSULE ORAL 3 TIMES DAILY
Status: DISCONTINUED | OUTPATIENT
Start: 2018-05-26 | End: 2018-05-29

## 2018-05-26 RX ORDER — NAPROXEN 250 MG/1
375 TABLET ORAL 2 TIMES DAILY PRN
Status: DISCONTINUED | OUTPATIENT
Start: 2018-05-26 | End: 2018-05-30 | Stop reason: HOSPADM

## 2018-05-26 RX ADMIN — NAPROXEN 375 MG: 250 TABLET ORAL at 21:35

## 2018-05-26 RX ADMIN — PANTOPRAZOLE SODIUM 40 MG: 40 TABLET, DELAYED RELEASE ORAL at 05:13

## 2018-05-26 RX ADMIN — CLONAZEPAM 1 MG: 1 TABLET ORAL at 16:56

## 2018-05-26 RX ADMIN — MIRTAZAPINE 15 MG: 15 TABLET, FILM COATED ORAL at 21:34

## 2018-05-26 RX ADMIN — TRAMADOL HYDROCHLORIDE 100 MG: 50 TABLET, FILM COATED ORAL at 16:56

## 2018-05-26 RX ADMIN — VENLAFAXINE HYDROCHLORIDE 75 MG: 75 CAPSULE, EXTENDED RELEASE ORAL at 08:32

## 2018-05-26 RX ADMIN — TRAMADOL HYDROCHLORIDE 100 MG: 50 TABLET, FILM COATED ORAL at 08:33

## 2018-05-26 RX ADMIN — GABAPENTIN 300 MG: 300 CAPSULE ORAL at 10:50

## 2018-05-26 RX ADMIN — GABAPENTIN 300 MG: 300 CAPSULE ORAL at 21:34

## 2018-05-26 RX ADMIN — GABAPENTIN 300 MG: 300 CAPSULE ORAL at 15:43

## 2018-05-26 RX ADMIN — ALPRAZOLAM 1 MG: 0.5 TABLET ORAL at 13:31

## 2018-05-26 RX ADMIN — NAPROXEN 375 MG: 250 TABLET ORAL at 10:50

## 2018-05-26 RX ADMIN — NICOTINE 1 PATCH: 14 PATCH TRANSDERMAL at 08:36

## 2018-05-26 RX ADMIN — LISINOPRIL 20 MG: 20 TABLET ORAL at 08:32

## 2018-05-26 RX ADMIN — AMLODIPINE BESYLATE 5 MG: 5 TABLET ORAL at 08:32

## 2018-05-26 RX ADMIN — CLONAZEPAM 1 MG: 1 TABLET ORAL at 08:32

## 2018-05-26 NOTE — PROGRESS NOTES
Progress Note - 10[address] [facility] 50 y o  female MRN: [id number]  Unit/Bed#: Chilo Levi 972-91 Encounter: [id number]    Assessment/Plan   Principal Problem:    Severe episode of recurrent major depressive disorder, without psychotic features ([address] )  Active Problems:    COPD (chronic obstructive pulmonary disease) ([facility])    Generalized anxiety disorder    Right knee pain      Subjective:  Patient is compliant with medication and reports slow improvement in mood and anxiety  No manic or psychotic symptoms noted  Patient was seen by orthopedics for right leg pain  I discussed orthopedic physicians recommendation with patient in detail  Patient understand the importance of outpatient follow-up for further management  Today patient is complaining of lower back pain radiating to right leg  Patient does have history of Sciatica Discussed the plan of adding naproxen as b i d  p r n  in addition to gabapentin as standing order for pain management  Patient is consenting for safety on the unit and is agreeable with treatment planning recommendation      Current Medications:    Current Facility-Administered Medications:  ALPRAZolam 1 mg Oral Q6H PRN Reggie Mtathew   aluminum-magnesium hydroxide-simethicone 30 mL Oral Q4H PRN Sammi Murray PA-C   amLODIPine 5 mg Oral Daily Chitra Macritchie, CRNP   benztropine 1 mg Intramuscular Q6H PRN Sammi Murray, PA-C   benztropine 1 mg Oral Q6H PRN Sammi Murray, PA-C   clonazePAM 1 mg Oral BID Reggie Matthew   gabapentin 300 mg Oral TID Reggie Matthew   haloperidol 5 mg Oral Q8H PRN Sammi Hawkinse, PA-C   haloperidol lactate 5 mg Intramuscular Q6H PRN Sammi Murray, PA-C   lisinopril 20 mg Oral Daily Chitra Macritchie, CRNP   LORazepam 1 mg Intramuscular Q8H PRN Sammi Alexandrante, PA-C   magnesium hydroxide 30 mL Oral Daily PRN Smami Murray, PA-C   mirtazapine 15 mg Oral HS Reggiecheco Matthew   naproxen 375 mg Oral BID PRN Reggie Matthew   nicotine 1 patch Transdermal Daily Sammi Murray PA-C   OLANZapine 5 mg Intramuscular Q3H PRN Sammi Murray PA-C   OLANZapine 5 mg Oral Q3H PRN Sammi Murray PA-C   pantoprazole 40 mg Oral Early Morning Ester Figueroa PA-C   traMADol 100 mg Oral Q8H PRN Reggie Matthew   venlafaxine 75 mg Oral Daily Reggie Matthew       Behavioral Health Medications: all current active meds have been reviewed  Vital signs in last 24 hours:  Temp:  [97 5 °F (36 4 °C)-98 4 °F (36 9 °C)] 97 5 °F (36 4 °C)  HR:  [100-125] 100  Resp:  [18-20] 18  BP: (153-159)/(78-96) 159/78    Laboratory results:    I have personally reviewed all pertinent laboratory/tests results    Labs in last 72 hours:   Recent Labs      05/24/18   0646   WBC  8 08   RBC  3 85   HGB  13 6   HCT  41 8   PLT  243   RDW  13 2   NEUTROABS  5 87   CHOL  222*   HDL  75*   TRIG  186*   LDLCALC  110*   PREGSERUM  Negative     Admission Labs:   Admission on 05/23/2018   Component Date Value    WBC 05/24/2018 8 08     RBC 05/24/2018 3 85     Hemoglobin 05/24/2018 13 6     Hematocrit 05/24/2018 41 8     MCV 05/24/2018 109*    MCH 05/24/2018 35 3*    MCHC 05/24/2018 32 5     RDW 05/24/2018 13 2     MPV 05/24/2018 9 7     Platelets 65/33/1082 243     Neutrophils Relative 05/24/2018 73     Lymphocytes Relative 05/24/2018 22     Monocytes Relative 05/24/2018 4     Eosinophils Relative 05/24/2018 1     Basophils Relative 05/24/2018 0     Neutrophils Absolute 05/24/2018 5 87     Lymphocytes Absolute 05/24/2018 1 81     Monocytes Absolute 05/24/2018 0 32     Eosinophils Absolute 05/24/2018 0 05     Basophils Absolute 05/24/2018 0 03     Preg, Serum 05/24/2018 Negative     Cholesterol 05/24/2018 222*    Triglycerides 05/24/2018 186*    HDL, Direct 05/24/2018 75*    LDL Calculated 05/24/2018 110*    Non-HDL-Chol (CHOL-HDL) 05/24/2018 147     Color, UA 05/24/2018 Yellow     Clarity, UA 05/24/2018 Slightly Cloudy     Specific Gravity, UA 05/24/2018 1 010     pH, UA 05/24/2018 7 0     Leukocytes, UA 05/24/2018 Negative     Nitrite, UA 05/24/2018 Negative     Protein, UA 05/24/2018 Negative     Glucose, UA 05/24/2018 Negative     Ketones, UA 05/24/2018 Negative     Urobilinogen, UA 05/24/2018 0 2     Bilirubin, UA 05/24/2018 Negative     Blood, UA 05/24/2018 Trace-Intact*    Folate 05/24/2018 8 4     Vitamin B-12 05/24/2018 239     RBC, UA 05/24/2018 0-1*    WBC, UA 05/24/2018 0-1*    Epithelial Cells 05/24/2018 Moderate*    Bacteria, UA 05/24/2018 Occasional     COARSE GRANULAR CASTS 05/24/2018 0-1     AMORPH PHOSPATES 05/24/2018 Moderate     MUCOUS THREADS 05/24/2018 Occasional*       Psychiatric Review of Systems:  Behavior over the last 24 hours:  unchanged  Sleep: insomnia  Appetite: normal  Medication side effects: No  ROS: back pain 8/10 (pain radiating to right leg)    Mental Status Evaluation:  Appearance:  casually dressed   Behavior:  guarded   Speech:  soft   Mood:  anxious and depressed   Affect:  constricted   Language naming objects and repeating phrases   Thought Process:  circumstantial   Thought Content:  obsessions   Perceptual Disturbances: None   Risk Potential: Suicidal Ideations without plan, Homicidal Ideations none and Potential for Aggression No   Sensorium:  person, place and time/date   Cognition:  grossly intact   Consciousness:  awake    Attention: attention span appeared shorter than expected for age   Insight:  limited   Judgment: limited   Intellect fair   Gait/Station: normal gait/station   Motor Activity: no abnormal movements     Progress Toward Goals: progressing    Recommended Treatment:   Add naproxen 375 mg b i d  p r n  for underlying pain management  Add gabapentin 300 mg t i d  for pain and anxiety management  Orthopedic consult appreciated  Continue with group therapy, milieu therapy and occupational therapy      Continue following current medications:   Current Facility-Administered Medications:  ALPRAZolam 1 mg Oral Q6H PRN [facility]   aluminum-magnesium hydroxide-simethicone 30 mL Oral Q4H PRN Sammi Murray PA-C   amLODIPine 5 mg Oral Daily CARLITOS Bardales   benztropine 1 mg Intramuscular Q6H PRN Sammi Murray, PA-C   benztropine 1 mg Oral Q6H PRN Sammi Murray, PA-C   clonazePAM 1 mg Oral BID [facility]   gabapentin 300 mg Oral TID [facility]   haloperidol 5 mg Oral Q8H PRN Sammi Murray, PA-C   haloperidol lactate 5 mg Intramuscular Q6H PRN Sammi Murray, BINA   lisinopril 20 mg Oral Daily CARLITOS Bardales   LORazepam 1 mg Intramuscular Q8H PRN Sammi Murray, PA-PAULINA   magnesium hydroxide 30 mL Oral Daily PRN Sammi Murray, PA-C   mirtazapine 15 mg Oral HS [facility]   naproxen 375 mg Oral BID PRN [facility]   nicotine 1 patch Transdermal Daily Sammi Murray PA-C   OLANZapine 5 mg Intramuscular Q3H PRN Sammi Murray, BINA   OLANZapine 5 mg Oral Q3H PRN Sammi Murray, BINA   pantoprazole 40 mg Oral Early Morning Ester Figueroa PA-C   traMADol 100 mg Oral Q8H PRN [facility]   venlafaxine 75 mg Oral Daily [facility]       Risks, benefits and possible side effects of Medications:   Risks, benefits, and possible side effects of medications explained to patient and patient verbalizes understanding  Risks of medications in pregnancy explained if female patient  Patient verbalizes understanding and agrees to notify her doctor if she becomes pregnant  This note has been constructed using a voice recognition system  There may be translation, syntax,  or grammatical errors  If you have any questions, please contact the dictating provider

## 2018-05-26 NOTE — PROGRESS NOTES
Pt pleasant and cooperative  Initially tearful this morning, felt overwhelmed with anxiety related to a few different things- parents coming to visit today and pt has not seen them for a really long time  Pt's dad has parkinsons and she doesn't know what to expect  Felt like she was scolded in front of peers this morning by a staff person  Usually in the most pain first thing in the morning which makes her start out slower physically  As the day progressed, pt felt more in control, socializing with peers in [facility], attending groups, and utilizing the telephone  Pt did request a prn medication for anxiety reduction prior to her parents coming  Denies SI  Feels hopeful for her depression to improve with med adjustment  Main issue per pt is trying to get her anxiety under control  Pt is very motivated  Support and encouragement provided

## 2018-05-27 PROCEDURE — 99232 SBSQ HOSP IP/OBS MODERATE 35: CPT | Performed by: PSYCHIATRY & NEUROLOGY

## 2018-05-27 RX ORDER — PROPRANOLOL HYDROCHLORIDE 10 MG/1
10 TABLET ORAL 2 TIMES DAILY
Status: DISCONTINUED | OUTPATIENT
Start: 2018-05-27 | End: 2018-05-30 | Stop reason: HOSPADM

## 2018-05-27 RX ADMIN — VENLAFAXINE HYDROCHLORIDE 75 MG: 75 CAPSULE, EXTENDED RELEASE ORAL at 08:03

## 2018-05-27 RX ADMIN — LISINOPRIL 20 MG: 20 TABLET ORAL at 08:03

## 2018-05-27 RX ADMIN — TRAMADOL HYDROCHLORIDE 100 MG: 50 TABLET, FILM COATED ORAL at 08:06

## 2018-05-27 RX ADMIN — TRAMADOL HYDROCHLORIDE 100 MG: 50 TABLET, FILM COATED ORAL at 16:21

## 2018-05-27 RX ADMIN — GABAPENTIN 300 MG: 300 CAPSULE ORAL at 21:31

## 2018-05-27 RX ADMIN — CLONAZEPAM 1 MG: 1 TABLET ORAL at 08:03

## 2018-05-27 RX ADMIN — PANTOPRAZOLE SODIUM 40 MG: 40 TABLET, DELAYED RELEASE ORAL at 05:57

## 2018-05-27 RX ADMIN — MIRTAZAPINE 15 MG: 15 TABLET, FILM COATED ORAL at 21:31

## 2018-05-27 RX ADMIN — CLONAZEPAM 1 MG: 1 TABLET ORAL at 17:15

## 2018-05-27 RX ADMIN — GABAPENTIN 300 MG: 300 CAPSULE ORAL at 17:15

## 2018-05-27 RX ADMIN — GABAPENTIN 300 MG: 300 CAPSULE ORAL at 08:03

## 2018-05-27 RX ADMIN — AMLODIPINE BESYLATE 5 MG: 5 TABLET ORAL at 08:03

## 2018-05-27 RX ADMIN — NICOTINE 1 PATCH: 14 PATCH TRANSDERMAL at 08:04

## 2018-05-27 RX ADMIN — PROPRANOLOL HYDROCHLORIDE 10 MG: 10 TABLET ORAL at 17:14

## 2018-05-27 NOTE — PROGRESS NOTES
Pt appeared bright and in positive mood, expressed feeling that current medications were effective  Reports experiencing minor anxiety at present, with a marked decrease from this morning due to having a very positive family visit with parents  States  is very supportive  Following discharge would like to seek employment to increase social involvement and is looking forward to continuing with aftercare  Pt continues to appear calm and attend groups, denies SI or depression  Reports ongoing pain in R knee and back, pain medications effective in decreasing pain from 8/10 to 4/10

## 2018-05-27 NOTE — PLAN OF CARE
Problem: DEPRESSION  Goal: Will be euthymic at discharge  INTERVENTIONS:  - Administer medication as ordered  - Provide emotional support via 1:1 interaction with staff  - Encourage involvement in milieu/groups/activities  - Monitor for social isolation   Outcome: Progressing

## 2018-05-27 NOTE — PROGRESS NOTES
Pt remains pleasant, calm and cooperative  Social with select peers  Denies SI/HI, anxiety  Minimal depression  Reports feeling happy with scheduled medications and does feel improvement with joint pain with current pain medications that are ordered  Pt also reports ice packs help in the AM  Encouraged pt to try this as well  Pt denies any current questions or concerns

## 2018-05-27 NOTE — PROGRESS NOTES
Progress Note - [address] [facility] 50 y o  female MRN: [id number]  Unit/Bed#: Justina Mixon 453-61 Encounter: [id number]    Assessment/Plan   Principal Problem:    Severe episode of recurrent major depressive disorder, without psychotic features ([facility] [address] )  Active Problems:    COPD (chronic obstructive pulmonary disease) ([facility])    Generalized anxiety disorder    Right knee pain      Subjective:  Patient is compliant with medications with no acute side effects  She does report improvement in pain after addition of gabapentin and naproxen p r n  [place] She does have improved affect today with no manic symptoms noted  Again discussed the diagnosis of major depression disorder and her not meeting criteria for bipolar disorder at this time  Patient reports understanding and is seen more out in milieu today attending groups  Her family also visited yesterday and she feel more support from family now      Current Medications:    Current Facility-Administered Medications:  ALPRAZolam 1 mg Oral Q6H PRN Reggie Matthew   aluminum-magnesium hydroxide-simethicone 30 mL Oral Q4H PRN Sammi Murray PA-C   benztropine 1 mg Intramuscular Q6H PRN Sammi Murray PA-C   benztropine 1 mg Oral Q6H PRN Sammi Murray, BINA   clonazePAM 1 mg Oral BID Reggiecheco Matthew   gabapentin 300 mg Oral TID Reggiecheco Matthew   haloperidol 5 mg Oral Q8H PRN Sammi Murray PA-C   haloperidol lactate 5 mg Intramuscular Q6H PRN Sammi Murray, BINA   lisinopril 20 mg Oral Daily CARLITOS Bardales   LORazepam 1 mg Intramuscular Q8H PRN Sammi Murray, PA-C   magnesium hydroxide 30 mL Oral Daily PRN Sammi Murray PA-C   mirtazapine 15 mg Oral HS Reggiecheco Matthew   naproxen 375 mg Oral BID PRN Reggiecheco Matthew   nicotine 1 patch Transdermal Daily Sammi Murray PA-C   OLANZapine 5 mg Intramuscular Q3H PRN Sammi Murray, BINA   OLANZapine 5 mg Oral Q3H PRN Sammi Murray, PA-PAULINA   pantoprazole 40 mg Oral Early Morning Ester LIND BINA Figueroa   propranolol 10 mg Oral BID Reggie Matthew   traMADol 100 mg Oral Q8H PRN Reggie Matthew   venlafaxine 75 mg Oral Daily Reggie Matthew       Behavioral Health Medications: all current active meds have been reviewed  Vital signs in last 24 hours:  Temp:  [97 2 °F (36 2 °C)-99 °F (37 2 °C)] 97 2 °F (36 2 °C)  HR:  [100-130] 130  Resp:  [18] 18  BP: (112-130)/(75-94) 112/75    Laboratory results:    I have personally reviewed all pertinent laboratory/tests results  Labs in last 72 hours: No results for input(s): WBC, RBC, HGB, HCT, PLT, RDW, NEUTROABS, NA, K, CL, CO2, BUN, CREATININE, GLUCOSE, CALCIUM, AST, ALT, ALKPHOS, PROT, ALBUMIN, BILITOT, CHOL, HDL, TRIG, LDLCALC, VALPROICTOT, CARBAMAZEPIN, LITHIUM, AMMONIA, ULA2PMSQFMOA, FREET4, T3FREE, PREGTESTUR, PREGSERUM, HCG, HCGQUANT, RPR in the last 72 hours      Invalid input(s):  RBC  Admission Labs:   Admission on 05/23/2018   Component Date Value    WBC 05/24/2018 8 08     RBC 05/24/2018 3 85     Hemoglobin 05/24/2018 13 6     Hematocrit 05/24/2018 41 8     MCV 05/24/2018 109*    MCH 05/24/2018 35 3*    MCHC 05/24/2018 32 5     RDW 05/24/2018 13 2     MPV 05/24/2018 9 7     Platelets 12/52/8623 243     Neutrophils Relative 05/24/2018 73     Lymphocytes Relative 05/24/2018 22     Monocytes Relative 05/24/2018 4     Eosinophils Relative 05/24/2018 1     Basophils Relative 05/24/2018 0     Neutrophils Absolute 05/24/2018 5 87     Lymphocytes Absolute 05/24/2018 1 81     Monocytes Absolute 05/24/2018 0 32     Eosinophils Absolute 05/24/2018 0 05     Basophils Absolute 05/24/2018 0 03     Preg, Serum 05/24/2018 Negative     Cholesterol 05/24/2018 222*    Triglycerides 05/24/2018 186*    HDL, Direct 05/24/2018 75*    LDL Calculated 05/24/2018 110*    Non-HDL-Chol (CHOL-HDL) 05/24/2018 147     Color, UA 05/24/2018 Yellow     Clarity, UA 05/24/2018 Slightly Cloudy     Specific Gravity, UA 05/24/2018 1 010     pH, UA 05/24/2018 7 0     Leukocytes, UA 05/24/2018 Negative     Nitrite, UA 05/24/2018 Negative     Protein, UA 05/24/2018 Negative     Glucose, UA 05/24/2018 Negative     Ketones, UA 05/24/2018 Negative     Urobilinogen, UA 05/24/2018 0 2     Bilirubin, UA 05/24/2018 Negative     Blood, UA 05/24/2018 Trace-Intact*    Folate 05/24/2018 8 4     Vitamin B-12 05/24/2018 239     RBC, UA 05/24/2018 0-1*    WBC, UA 05/24/2018 0-1*    Epithelial Cells 05/24/2018 Moderate*    Bacteria, UA 05/24/2018 Occasional     COARSE GRANULAR CASTS 05/24/2018 0-1     AMORPH PHOSPATES 05/24/2018 Moderate     MUCOUS THREADS 05/24/2018 Occasional*       Psychiatric Review of Systems:  Behavior over the last 24 hours:  Slow improvement  Sleep: normal  Appetite: normal  Medication side effects: No  ROS: no complaints    Mental Status Evaluation:  Appearance:  casually dressed   Behavior:  guarded   Speech:  soft   Mood:  anxious and depressed   Affect:  constricted   Language naming objects and repeating phrases   Thought Process:  circumstantial   Thought Content:  obsessions   Perceptual Disturbances: None   Risk Potential: Suicidal Ideations without plan, Homicidal Ideations none and Potential for Aggression No   Sensorium:  person and place   Cognition:  grossly intact   Consciousness:  awake    Attention: attention span appeared shorter than expected for age   Insight:  limited   Judgment: limited   Intellect fair   Gait/Station: normal gait/station   Motor Activity: no abnormal movements     Progress Toward Goals: progressing    Recommended Treatment:   Switch Norvasc to  Inderal 10 mg b i d  for persistent tachycardia despite good pain control  Continue with group therapy, milieu therapy and occupational therapy      Continue following current medications:   Current Facility-Administered Medications:  ALPRAZolam 1 mg Oral Q6H PRN Reggie Matthew   aluminum-magnesium hydroxide-simethicone 30 mL Oral Q4H PRN Sammi BINA Murray   benztropine 1 mg Intramuscular Q6H PRN Sammi Murray PA-C   benztropine 1 mg Oral Q6H PRN Sammi Murray PA-C   clonazePAM 1 mg Oral BID [facility]   gabapentin 300 mg Oral TID [facility]   haloperidol 5 mg Oral Q8H PRN Sammi Murray, BINA   haloperidol lactate 5 mg Intramuscular Q6H PRN Sammi Murray PA-C   lisinopril 20 mg Oral Daily CARLITOS Bardales   LORazepam 1 mg Intramuscular Q8H PRN Sammi Murray PA-C   magnesium hydroxide 30 mL Oral Daily PRN Sammi Murray PA-C   mirtazapine 15 mg Oral HS [facility]   naproxen 375 mg Oral BID PRN [facility]   nicotine 1 patch Transdermal Daily Sammi Murray PA-C   OLANZapine 5 mg Intramuscular Q3H PRN Sammi Murray, BINA   OLANZapine 5 mg Oral Q3H PRN Sammi Murray PA-C   pantoprazole 40 mg Oral Early Morning Ester Figueroa PA-C   propranolol 10 mg Oral BID [facility]   traMADol 100 mg Oral Q8H PRN [facility]   venlafaxine 75 mg Oral Daily [facility]       Risks, benefits and possible side effects of Medications:   Risks, benefits, and possible side effects of medications explained to patient and patient verbalizes understanding  Risks of medications in pregnancy explained if female patient  Patient verbalizes understanding and agrees to notify her doctor if she becomes pregnant  This note has been constructed using a voice recognition system  There may be translation, syntax,  or grammatical errors  If you have any questions, please contact the dictating provider

## 2018-05-28 PROCEDURE — 99232 SBSQ HOSP IP/OBS MODERATE 35: CPT | Performed by: PSYCHIATRY & NEUROLOGY

## 2018-05-28 RX ADMIN — NICOTINE 1 PATCH: 14 PATCH TRANSDERMAL at 08:27

## 2018-05-28 RX ADMIN — VENLAFAXINE HYDROCHLORIDE 75 MG: 75 CAPSULE, EXTENDED RELEASE ORAL at 08:26

## 2018-05-28 RX ADMIN — PROPRANOLOL HYDROCHLORIDE 10 MG: 10 TABLET ORAL at 17:18

## 2018-05-28 RX ADMIN — CLONAZEPAM 1 MG: 1 TABLET ORAL at 08:26

## 2018-05-28 RX ADMIN — NICOTINE POLACRILEX 2 MG: 2 GUM, CHEWING BUCCAL at 15:45

## 2018-05-28 RX ADMIN — MIRTAZAPINE 15 MG: 15 TABLET, FILM COATED ORAL at 21:24

## 2018-05-28 RX ADMIN — TRAMADOL HYDROCHLORIDE 100 MG: 50 TABLET, FILM COATED ORAL at 23:48

## 2018-05-28 RX ADMIN — TRAMADOL HYDROCHLORIDE 100 MG: 50 TABLET, FILM COATED ORAL at 15:44

## 2018-05-28 RX ADMIN — NAPROXEN 375 MG: 250 TABLET ORAL at 10:10

## 2018-05-28 RX ADMIN — PROPRANOLOL HYDROCHLORIDE 10 MG: 10 TABLET ORAL at 08:26

## 2018-05-28 RX ADMIN — TRAMADOL HYDROCHLORIDE 100 MG: 50 TABLET, FILM COATED ORAL at 07:10

## 2018-05-28 RX ADMIN — GABAPENTIN 300 MG: 300 CAPSULE ORAL at 21:23

## 2018-05-28 RX ADMIN — CLONAZEPAM 1 MG: 1 TABLET ORAL at 17:18

## 2018-05-28 RX ADMIN — GABAPENTIN 300 MG: 300 CAPSULE ORAL at 08:26

## 2018-05-28 RX ADMIN — PANTOPRAZOLE SODIUM 40 MG: 40 TABLET, DELAYED RELEASE ORAL at 06:49

## 2018-05-28 RX ADMIN — LISINOPRIL 20 MG: 20 TABLET ORAL at 08:26

## 2018-05-28 RX ADMIN — GABAPENTIN 300 MG: 300 CAPSULE ORAL at 17:18

## 2018-05-28 RX ADMIN — NICOTINE POLACRILEX 2 MG: 2 GUM, CHEWING BUCCAL at 21:27

## 2018-05-28 NOTE — PROGRESS NOTES
Progress Note - 1039 [facility] 50 y o  female MRN: [id number]  Unit/Bed#: Yenni Wood 487-35 Encounter: [id number]    Assessment/Plan   Principal Problem:    Severe episode of recurrent major depressive disorder, without psychotic features ([facility] [address] )  Active Problems:    COPD (chronic obstructive pulmonary disease) ([facility])    Generalized anxiety disorder    Right knee pain      Subjective: patient seen in room and using heating pack and ice pack for 12/10 pain in knee and back  Very upset she could not go to groups today and is afraid if she feels like this she will never be able to tolerate the partial program  Wants anything stronger than the tramadol and naproxen (yesterday it is appears she was feeling better with this combination)  Reviewed ortho notes and they do not indicate narcotics  Crying, moaning in room and very anxious about discharge and decompensating due to pain  Has not received PRN xanax yet today      Current Medications:    Current Facility-Administered Medications:  ALPRAZolam 1 mg Oral Q6H PRN [facility]   aluminum-magnesium hydroxide-simethicone 30 mL Oral Q4H PRN BETHANY Waller-C   benztropine 1 mg Intramuscular Q6H PRN Sammi Murray, PA-C   benztropine 1 mg Oral Q6H PRN Sammi Murray, PA-C   clonazePAM 1 mg Oral BID [facility]   gabapentin 300 mg Oral TID [facility]   haloperidol 5 mg Oral Q8H PRN Sammi Murray, PA-C   haloperidol lactate 5 mg Intramuscular Q6H PRN Sammi Murray, PA-C   lisinopril 20 mg Oral Daily CARLITOS Bardales   LORazepam 1 mg Intramuscular Q8H PRN Sammi Murray, PA-C   magnesium hydroxide 30 mL Oral Daily PRN Sammi Murray, PA-C   mirtazapine 15 mg Oral HS [facility]   naproxen 375 mg Oral BID PRN [facility]   nicotine 1 patch Transdermal Daily Sammi Murray PA-C   OLANZapine 5 mg Intramuscular Q3H PRN Sammi Murray, PA-C   OLANZapine 5 mg Oral Q3H PRN Sammi Murray, PA-C   pantoprazole 40 mg Oral Early Morning Ester Figueroa PA-C   propranolol 10 mg Oral BID Reggie Matthew   traMADol 100 mg Oral Q8H PRN Reggie Matthew   venlafaxine 75 mg Oral Daily Reggie Matthew       Behavioral Health Medications: all current active meds have been reviewed  Vital signs in last 24 hours:  Temp:  [97 1 °F (36 2 °C)-97 6 °F (36 4 °C)] 97 1 °F (36 2 °C)  HR:  [109-118] 118  Resp:  [18] 18  BP: (112-141)/(62-84) 112/84    Laboratory results:    I have personally reviewed all pertinent laboratory/tests results    Most Recent Labs:   Lab Results   Component Value Date    WBC 8 08 05/24/2018    RBC 3 85 05/24/2018    HGB 13 6 05/24/2018    HCT 41 8 05/24/2018     05/24/2018    RDW 13 2 05/24/2018    NEUTROABS 5 87 05/24/2018     05/22/2018    K 3 6 05/22/2018     05/22/2018    CO2 29 05/22/2018    BUN 3 (L) 05/22/2018    CREATININE 0 67 05/22/2018    GLUCOSE 101 05/22/2018    CALCIUM 9 1 05/22/2018    AST 25 05/20/2018    ALT 20 05/20/2018    ALKPHOS 74 05/20/2018    PROT 8 0 05/20/2018    BILITOT 0 32 05/20/2018    CHOL 222 (H) 05/24/2018    HDL 75 (H) 05/24/2018    TRIG 186 (H) 05/24/2018    LDLCALC 110 (H) 05/24/2018    LITHIUM 0 6 05/22/2018    [id number] 1 297 05/20/2018    FREET4 0 78 04/16/2018    PREGTESTUR HCG = neg (-) 06/25/2017    PREGSERUM Negative 05/24/2018       Psychiatric Review of Systems:  Behavior over the last 24 hours:  regressed  Sleep: normal  Appetite: normal  Medication side effects: No  ROS: back, knee, shoulder pain    Mental Status Evaluation:  Appearance:  age appropriate   Behavior:  cooperative   Speech:  tearful tone   Mood:  depressed   Affect:  normal and tearful   Language naming objects   Thought Process:  circumstantial   Thought Content:  obsessions   Perceptual Disturbances: None   Risk Potential: Suicidal Ideations none and Homicidal Ideations none   Sensorium:  person, place and situation   Cognition:  grossly intact   Consciousness:  alert and awake Attention: attention span appeared shorter than expected for age   Insight:  fair   Judgment: fair   Intellect fair   Gait/Station: normal gait/station   Motor Activity: no abnormal movements     Progress Toward Goals: pain is worse today, concerned about dc    Recommended Treatment:   Pain management per ortho  Continue with group therapy, milieu therapy and occupational therapy  Continue following current medications:   Current Facility-Administered Medications:  ALPRAZolam 1 mg Oral Q6H PRN Reggie Matthew   aluminum-magnesium hydroxide-simethicone 30 mL Oral Q4H PRN Sammi Hawkinse, PA-C   benztropine 1 mg Intramuscular Q6H PRN Sammi Ibrahimante, PA-C   benztropine 1 mg Oral Q6H PRN Sammi Alexandrante, PA-C   clonazePAM 1 mg Oral BID Reggie Matthew   gabapentin 300 mg Oral TID Reggie Matthew   haloperidol 5 mg Oral Q8H PRN Sammi Alexandrante, PA-C   haloperidol lactate 5 mg Intramuscular Q6H PRN Sammi Hawkinse, PA-C   lisinopril 20 mg Oral Daily CARLITOS Bardales   LORazepam 1 mg Intramuscular Q8H PRN Sammi Hawkinse, PA-C   magnesium hydroxide 30 mL Oral Daily PRN Sammi Hawkinse, PA-C   mirtazapine 15 mg Oral HS Reggie Matthew   naproxen 375 mg Oral BID PRN Reggiecheco Matthew   nicotine 1 patch Transdermal Daily Sammi Murray, PA-C   OLANZapine 5 mg Intramuscular Q3H PRN Sammi Hawkinse, PA-C   OLANZapine 5 mg Oral Q3H PRN Sammi Hawkinse, PA-C   pantoprazole 40 mg Oral Early Morning ANGELITO VillelaC   propranolol 10 mg Oral BID Reggiecheco Matthew   traMADol 100 mg Oral Q8H PRN Reggiecheco Matthew   venlafaxine 75 mg Oral Daily Reggie Matthew       Risks, benefits and possible side effects of Medications:   Risks, benefits, and possible side effects of medications explained to patient and patient verbalizes understanding  This note has been constructed using a voice recognition system  There may be translation, syntax,  or grammatical errors   If you have any questions, please contact the dictating provider

## 2018-05-29 ENCOUNTER — TELEPHONE (OUTPATIENT)
Dept: OBGYN CLINIC | Facility: HOSPITAL | Age: 48
End: 2018-05-29

## 2018-05-29 ENCOUNTER — TELEPHONE (OUTPATIENT)
Dept: PSYCHOLOGY | Facility: CLINIC | Age: 48
End: 2018-05-29

## 2018-05-29 PROCEDURE — 99232 SBSQ HOSP IP/OBS MODERATE 35: CPT | Performed by: PSYCHIATRY & NEUROLOGY

## 2018-05-29 RX ORDER — TRAMADOL HYDROCHLORIDE 50 MG/1
100 TABLET ORAL EVERY 6 HOURS PRN
Status: DISCONTINUED | OUTPATIENT
Start: 2018-05-29 | End: 2018-05-30 | Stop reason: HOSPADM

## 2018-05-29 RX ORDER — GABAPENTIN 400 MG/1
400 CAPSULE ORAL 3 TIMES DAILY
Status: DISCONTINUED | OUTPATIENT
Start: 2018-05-29 | End: 2018-05-30 | Stop reason: HOSPADM

## 2018-05-29 RX ADMIN — GABAPENTIN 400 MG: 400 CAPSULE ORAL at 15:39

## 2018-05-29 RX ADMIN — LISINOPRIL 20 MG: 20 TABLET ORAL at 08:33

## 2018-05-29 RX ADMIN — GABAPENTIN 400 MG: 400 CAPSULE ORAL at 21:37

## 2018-05-29 RX ADMIN — MIRTAZAPINE 15 MG: 15 TABLET, FILM COATED ORAL at 21:37

## 2018-05-29 RX ADMIN — TRAMADOL HYDROCHLORIDE 100 MG: 50 TABLET, FILM COATED ORAL at 17:33

## 2018-05-29 RX ADMIN — CLONAZEPAM 1 MG: 1 TABLET ORAL at 08:32

## 2018-05-29 RX ADMIN — TRAMADOL HYDROCHLORIDE 100 MG: 50 TABLET, FILM COATED ORAL at 11:13

## 2018-05-29 RX ADMIN — NICOTINE 1 PATCH: 14 PATCH TRANSDERMAL at 08:33

## 2018-05-29 RX ADMIN — GABAPENTIN 300 MG: 300 CAPSULE ORAL at 08:32

## 2018-05-29 RX ADMIN — CLONAZEPAM 1 MG: 1 TABLET ORAL at 17:28

## 2018-05-29 RX ADMIN — VENLAFAXINE HYDROCHLORIDE 75 MG: 75 CAPSULE, EXTENDED RELEASE ORAL at 08:33

## 2018-05-29 RX ADMIN — ALPRAZOLAM 1 MG: 0.5 TABLET ORAL at 21:37

## 2018-05-29 RX ADMIN — PANTOPRAZOLE SODIUM 40 MG: 40 TABLET, DELAYED RELEASE ORAL at 07:00

## 2018-05-29 RX ADMIN — NAPROXEN 375 MG: 250 TABLET ORAL at 03:50

## 2018-05-29 RX ADMIN — PROPRANOLOL HYDROCHLORIDE 10 MG: 10 TABLET ORAL at 08:33

## 2018-05-29 RX ADMIN — PROPRANOLOL HYDROCHLORIDE 10 MG: 10 TABLET ORAL at 17:29

## 2018-05-29 NOTE — CASE MANAGEMENT
CM received a voice-mail from [employer] located @ [address] [place] [address] (B) [phone] who communicated that he feels that PT is mentally doing better; however, is struggling w/ her herniated discs   Ricki inquired about locations of where PT can go that are in-network w/ insurance regarding injections for pain, etc

## 2018-05-29 NOTE — PROGRESS NOTES
Pt requested naproxen for generalized pain  Medication effective  Pt is asleep  Continue to monitor

## 2018-05-29 NOTE — CASE MANAGEMENT
LOVE outreached to PT's - [address] located @ [address] [place] [address] (P) [phone] and provided an update  CM reviewed PT's D/C plan and aftercare services  Ricki reports that he is in support of PT's D/C plan and plans to pick PT up tomorrow @ 10:00am  CM provided psychoeducation to Ricki Robison confirmed that tomorrow when he picks PT up, they plan to go through medical plans and pick one for PT  Ricki is hoping the PT can have an injection administered at the hospital prior to PT's D/C to support her w/ stepping down to PHP, and by time until they are able to pick an insurance plan and identify an O/P provider  LOVE provided this update to MD  Agreed to be in contact regarding progression in stabilization and D/C planning

## 2018-05-29 NOTE — PLAN OF CARE
Problem: SELF HARM/SUICIDALITY  Goal: Will have no self-injury during hospital stay  INTERVENTIONS:  - Q 15 MINUTES: Routine safety checks  - Q WAKING SHIFT & PRN: Assess risk to determine if routine checks are adequate to maintain patient safety  - Encourage patient to participate actively in care by formulating a plan to combat response to suicidal ideation, identify supports and resources   Outcome: Progressing

## 2018-05-29 NOTE — PROGRESS NOTES
Pt is pleasant and cooperative  She denies S/H/I  She rates pain in right knee as a "7," and states she has Herniated Disc and Sciatica  She hopes to have MRI and Spinal Injection done here prior to D/C and hopes to go to [facility]  Will continue to monitor, provide support and encouragement

## 2018-05-29 NOTE — PROGRESS NOTES
Pt received tramadol 100 mg for "back pain " Medication effective  Pt is currently in bed sleeping  Continue to monitor

## 2018-05-29 NOTE — PROGRESS NOTES
Pt is pleasant and cooperative  Denies S/H/I  Social and interactive with peers  Will continue to monitor

## 2018-05-29 NOTE — CASE MANAGEMENT
PT signed JENNY for the following:     [facility] located @ [address] [facility] [address] (A) [phone] (C) [phone]    CM received a return telephone call from Denver Colander @ [facility] [address] [place] [address] (E) [phone] (R) [phone]  CM completed a referral for PT for PHP  PT is scheduled to start partial hospital programming on Monday 06/04/18 @ 8:00am for ongoing medication management, therapy, case management, and clinical structured groups

## 2018-05-29 NOTE — PLAN OF CARE
Problem: Ineffective Coping  Goal: Participates in unit activities  Interventions:  - Provide therapeutic environment   - Provide required programming   - Redirect inappropriate behaviors    Outcome: Progressing  Pt attended all therapeutic groups today  She presented as pleasant and cooperative  Pt social with peers and engaged frequently in group discussions  Pt completed the 7-day post d/c schedule in preparation for discharge

## 2018-05-29 NOTE — CASE MANAGEMENT
CM outreached to [address] located @ [address]  [facility] [address] (C) [phone] (k) [phone] and left a voice-mail requesting a call back regarding a referral for PHP

## 2018-05-29 NOTE — PROGRESS NOTES
Progress Note - 1039 [facility] 50 y o  female MRN: [id number]  Unit/Bed#: [facility] 525-44 Encounter: [id number]    Assessment/Plan   Principal Problem:    Severe episode of recurrent major depressive disorder, without psychotic features ([address] )  Active Problems:    COPD (chronic obstructive pulmonary disease) ([facility])    Generalized anxiety disorder    Right knee pain      Subjective:  Patient is compliant with medications with no acute side effects  Patient does report improvement in mood and anxiety and denies endorsing suicidal or homicidal ideation  Patient remained focused on her lower back pain getting worse due to this admission and sleeping on bed  She reports pain is radiating to her right leg but not associated with changes in bowel, bladder or Gustavus like sensation  I also discussed this case with orthopedic consultation liaison physician and this management is deferred to outpatient orthopedics on discharge  Patient remained focused on discharge and agreeable with plan of discharge tomorrow if she continues to show signs of improvement today      Current Medications:    Current Facility-Administered Medications:  ALPRAZolam 1 mg Oral Q6H PRN Reggie Matthew   aluminum-magnesium hydroxide-simethicone 30 mL Oral Q4H PRN Sammi Murray PA-C   benztropine 1 mg Intramuscular Q6H PRN Sammi Murray, PA-C   benztropine 1 mg Oral Q6H PRN Sammi Murray, PA-C   clonazePAM 1 mg Oral BID Reggie Matthew   gabapentin 400 mg Oral TID Reggie Matthew   haloperidol 5 mg Oral Q8H PRN Sammi Murray, PA-C   haloperidol lactate 5 mg Intramuscular Q6H PRN Sammi Murray, PA-C   lisinopril 20 mg Oral Daily CARLITOS Bardales   LORazepam 1 mg Intramuscular Q8H PRN Sammi Murray, PA-C   magnesium hydroxide 30 mL Oral Daily PRN Sammi Murray, PA-C   mirtazapine 15 mg Oral HS Reggie Matthew   naproxen 375 mg Oral BID PRN Reggie Matthew   nicotine 1 patch Transdermal Daily Sammi BINA Murray   nicotine polacrilex 2 mg Oral Q2H PRN Mariajose Nj MD   OLANZapine 5 mg Intramuscular Q3H PRN Sammi Murray PA-C   OLANZapine 5 mg Oral Q3H PRN Sammi Murray PA-C   pantoprazole 40 mg Oral Early Morning Ester Figueroa PA-C   propranolol 10 mg Oral BID Reggie Matthew   traMADol 100 mg Oral Q6H PRN Reggie Matthew   venlafaxine 75 mg Oral Daily Reggie Matthew       Behavioral Health Medications: all current active meds have been reviewed  Vital signs in last 24 hours:  Temp:  [97 6 °F (36 4 °C)-98 3 °F (36 8 °C)] 97 6 °F (36 4 °C)  HR:  [95-97] 95  Resp:  [18-20] 18  BP: (130-132)/(78-80) 130/78    Laboratory results:    I have personally reviewed all pertinent laboratory/tests results  Labs in last 72 hours: No results for input(s): WBC, RBC, HGB, HCT, PLT, RDW, NEUTROABS, NA, K, CL, CO2, BUN, CREATININE, GLUCOSE, CALCIUM, AST, ALT, ALKPHOS, PROT, ALBUMIN, BILITOT, CHOL, HDL, TRIG, LDLCALC, VALPROICTOT, CARBAMAZEPIN, LITHIUM, AMMONIA, [id number], FREET4, T3FREE, PREGTESTUR, PREGSERUM, HCG, HCGQUANT, RPR in the last 72 hours      Invalid input(s):  RBC  Admission Labs:   Admission on 05/23/2018   Component Date Value    WBC 05/24/2018 8 08     RBC 05/24/2018 3 85     Hemoglobin 05/24/2018 13 6     Hematocrit 05/24/2018 41 8     MCV 05/24/2018 109*    MCH 05/24/2018 35 3*    MCHC 05/24/2018 32 5     RDW 05/24/2018 13 2     MPV 05/24/2018 9 7     Platelets 97/60/5355 243     Neutrophils Relative 05/24/2018 73     Lymphocytes Relative 05/24/2018 22     Monocytes Relative 05/24/2018 4     Eosinophils Relative 05/24/2018 1     Basophils Relative 05/24/2018 0     Neutrophils Absolute 05/24/2018 5 87     Lymphocytes Absolute 05/24/2018 1 81     Monocytes Absolute 05/24/2018 0 32     Eosinophils Absolute 05/24/2018 0 05     Basophils Absolute 05/24/2018 0 03     Preg, Serum 05/24/2018 Negative     Cholesterol 05/24/2018 222*    Triglycerides 05/24/2018 186*    HDL, Direct 05/24/2018 75*    LDL Calculated 05/24/2018 110*    Non-HDL-Chol (CHOL-HDL) 05/24/2018 147     Color, UA 05/24/2018 Yellow     Clarity, UA 05/24/2018 Slightly Cloudy     Specific Gravity, UA 05/24/2018 1 010     pH, UA 05/24/2018 7 0     Leukocytes, UA 05/24/2018 Negative     Nitrite, UA 05/24/2018 Negative     Protein, UA 05/24/2018 Negative     Glucose, UA 05/24/2018 Negative     Ketones, UA 05/24/2018 Negative     Urobilinogen, UA 05/24/2018 0 2     Bilirubin, UA 05/24/2018 Negative     Blood, UA 05/24/2018 Trace-Intact*    Folate 05/24/2018 8 4     Vitamin B-12 05/24/2018 239     RBC, UA 05/24/2018 0-1*    WBC, UA 05/24/2018 0-1*    Epithelial Cells 05/24/2018 Moderate*    Bacteria, UA 05/24/2018 Occasional     COARSE GRANULAR CASTS 05/24/2018 0-1     AMORPH PHOSPATES 05/24/2018 Moderate     MUCOUS THREADS 05/24/2018 Occasional*       Psychiatric Review of Systems:  Behavior over the last 24 hours:  improving  Sleep: normal  Appetite: normal  Medication side effects: No  ROS: lower back pain    Mental Status Evaluation:  Appearance:  casually dressed   Behavior:  guarded   Speech:  soft   Mood:  anxious and depressed   Affect:  constricted   Language naming objects   Thought Process:  normal   Thought Content:  normal   Perceptual Disturbances: None   Risk Potential: Suicidal Ideations none, Homicidal Ideations none and Potential for Aggression No   Sensorium:  person and place   Cognition:  grossly intact   Consciousness:  awake    Attention: attention span appeared shorter than expected for age   Insight:  limited   Judgment: limited   Intellect fair   Gait/Station: normal gait/station   Motor Activity: no abnormal movements     Progress Toward Goals: progressing    Recommended Treatment:   Continue with group therapy, milieu therapy and occupational therapy      Continue following current medications:   Current Facility-Administered Medications:  ALPRAZolam 1 mg Oral Q6H PRN [facility]   aluminum-magnesium hydroxide-simethicone 30 mL Oral Q4H PRN Sammi Murray PA-C   benztropine 1 mg Intramuscular Q6H PRN Sammi Murray PA-C   benztropine 1 mg Oral Q6H PRN Sammi Murray PA-C   clonazePAM 1 mg Oral BID [facility]   gabapentin 400 mg Oral TID [facility]   haloperidol 5 mg Oral Q8H PRN Sammi Murray PA-C   haloperidol lactate 5 mg Intramuscular Q6H PRN Sammi Murray PA-C   lisinopril 20 mg Oral Daily CARLITOS Bardales   LORazepam 1 mg Intramuscular Q8H PRN Sammi Murray PA-C   magnesium hydroxide 30 mL Oral Daily PRN Sammi Murray PA-C   mirtazapine 15 mg Oral HS [facility]   naproxen 375 mg Oral BID PRN [facility]   nicotine 1 patch Transdermal Daily Sammi Murray PA-C   nicotine polacrilex 2 mg Oral Q2H PRN Alec Gayle MD   OLANZapine 5 mg Intramuscular Q3H PRN Sammi Murray PA-C   OLANZapine 5 mg Oral Q3H PRN Samim Murray PA-C   pantoprazole 40 mg Oral Early Morning Ester Figueroa PA-C   propranolol 10 mg Oral BID [facility]   traMADol 100 mg Oral Q6H PRN [facility]   venlafaxine 75 mg Oral Daily [facility]       Risks, benefits and possible side effects of Medications:   Risks, benefits, and possible side effects of medications explained to patient and patient verbalizes understanding  Risks of medications in pregnancy explained if female patient  Patient verbalizes understanding and agrees to notify her doctor if she becomes pregnant  This note has been constructed using a voice recognition system  There may be translation, syntax,  or grammatical errors  If you have any questions, please contact the dictating provider

## 2018-05-29 NOTE — CASE MANAGEMENT
MD informed LOVE that he is changing PT's D/C from Friday to Wednesday  CM outreached to [facility] located @ [address]  [facility] [address] (I) [phone] (M) [phone] and provided an update on PT's change in D/C   PT can start [facility] on Thursday 05/31/18 @ 8:00am

## 2018-05-29 NOTE — TELEPHONE ENCOUNTER
Caller: Odell Said, Dr Tahir Humphrey office  Call back number: [phone]    Patient was seen as a consult in the ER  The doctor is asking to speak to you

## 2018-05-29 NOTE — CASE MANAGEMENT
CM met w/ PT today  CM reviewed PT's referral for [facility] and start date  PT communicated desire to D/C today due to being in pain and reports that she and her  need to sit down together and review PT's MA packet and to pick a medical insurance plan  PT communicated desire to do this in order to schedule a MRI and also to receive O/P Ortho services for her pain injections  PT communicated that she is uncomfortable and doesn't want to prolong this any further  Discussed that PT will need to review this further w/ MD today  PT verbalized understanding this and agreed to this  MD agreed to meet w/ PT today and follow up w/ CM accordingly

## 2018-05-29 NOTE — PROGRESS NOTES
Pt pleasant and appropriate during interaction  Continues to report knee and back pain  States she is unsatisfied with pain meds and plan to discuss this with the doctor tomorrow  Observed in milieu most of this evening  Denies SI,HI,anxiety and depression  States she is looking forward to discharge  Will continue to monitor

## 2018-05-29 NOTE — PLAN OF CARE
ANXIETY     Will report anxiety at manageable levels Progressing     By discharge: Patient will verbalize 2 strategies to deal with anxiety Progressing        DEPRESSION     Will be euthymic at discharge Progressing        Ineffective Coping     Participates in unit activities Progressing        Nutrition/Hydration-ADULT     Nutrient/Hydration intake appropriate for improving, restoring or maintaining nutritional needs Progressing        SELF HARM/SUICIDALITY     Will have no self-injury during hospital stay Progressing

## 2018-05-30 VITALS
BODY MASS INDEX: 26.6 KG/M2 | WEIGHT: 165.5 LBS | HEIGHT: 66 IN | SYSTOLIC BLOOD PRESSURE: 113 MMHG | DIASTOLIC BLOOD PRESSURE: 74 MMHG | TEMPERATURE: 97.4 F | RESPIRATION RATE: 17 BRPM | HEART RATE: 101 BPM

## 2018-05-30 PROCEDURE — 99239 HOSP IP/OBS DSCHRG MGMT >30: CPT | Performed by: PSYCHIATRY & NEUROLOGY

## 2018-05-30 RX ORDER — PROPRANOLOL HYDROCHLORIDE 10 MG/1
10 TABLET ORAL 2 TIMES DAILY
Qty: 30 TABLET | Refills: 1 | Status: SHIPPED | OUTPATIENT
Start: 2018-05-30 | End: 2018-10-31 | Stop reason: HOSPADM

## 2018-05-30 RX ORDER — GABAPENTIN 400 MG/1
400 CAPSULE ORAL 3 TIMES DAILY
Qty: 21 CAPSULE | Refills: 0 | Status: SHIPPED | OUTPATIENT
Start: 2018-05-30 | End: 2018-10-31 | Stop reason: HOSPADM

## 2018-05-30 RX ORDER — MIRTAZAPINE 15 MG/1
15 TABLET, FILM COATED ORAL
Qty: 7 TABLET | Refills: 0 | Status: SHIPPED | OUTPATIENT
Start: 2018-05-30 | End: 2018-06-04 | Stop reason: SDUPTHER

## 2018-05-30 RX ORDER — PROPRANOLOL HYDROCHLORIDE 10 MG/1
10 TABLET ORAL 2 TIMES DAILY
Qty: 30 TABLET | Refills: 1 | Status: SHIPPED | OUTPATIENT
Start: 2018-05-30 | End: 2018-05-30

## 2018-05-30 RX ORDER — LISINOPRIL 20 MG/1
20 TABLET ORAL DAILY
Qty: 14 TABLET | Refills: 1 | Status: SHIPPED | OUTPATIENT
Start: 2018-05-31 | End: 2018-10-31 | Stop reason: HOSPADM

## 2018-05-30 RX ORDER — VENLAFAXINE HYDROCHLORIDE 75 MG/1
75 CAPSULE, EXTENDED RELEASE ORAL DAILY
Qty: 7 CAPSULE | Refills: 0 | Status: SHIPPED | OUTPATIENT
Start: 2018-05-31 | End: 2018-10-31 | Stop reason: HOSPADM

## 2018-05-30 RX ORDER — CLONAZEPAM 1 MG/1
1 TABLET ORAL 2 TIMES DAILY
Qty: 14 TABLET | Refills: 0 | Status: SHIPPED | OUTPATIENT
Start: 2018-05-30 | End: 2018-10-31 | Stop reason: HOSPADM

## 2018-05-30 RX ORDER — TRAMADOL HYDROCHLORIDE 50 MG/1
100 TABLET ORAL EVERY 8 HOURS PRN
Qty: 30 TABLET | Refills: 1 | Status: SHIPPED | OUTPATIENT
Start: 2018-05-30 | End: 2018-10-31 | Stop reason: HOSPADM

## 2018-05-30 RX ORDER — TRAMADOL HYDROCHLORIDE 50 MG/1
100 TABLET ORAL EVERY 6 HOURS PRN
Qty: 14 TABLET | Refills: 0 | Status: SHIPPED | OUTPATIENT
Start: 2018-05-30 | End: 2018-05-30

## 2018-05-30 RX ADMIN — PANTOPRAZOLE SODIUM 40 MG: 40 TABLET, DELAYED RELEASE ORAL at 05:03

## 2018-05-30 RX ADMIN — GABAPENTIN 400 MG: 400 CAPSULE ORAL at 09:02

## 2018-05-30 RX ADMIN — LISINOPRIL 20 MG: 20 TABLET ORAL at 09:02

## 2018-05-30 RX ADMIN — TRAMADOL HYDROCHLORIDE 100 MG: 50 TABLET, FILM COATED ORAL at 04:38

## 2018-05-30 RX ADMIN — PROPRANOLOL HYDROCHLORIDE 10 MG: 10 TABLET ORAL at 09:02

## 2018-05-30 RX ADMIN — NICOTINE 1 PATCH: 14 PATCH TRANSDERMAL at 09:05

## 2018-05-30 RX ADMIN — VENLAFAXINE HYDROCHLORIDE 75 MG: 75 CAPSULE, EXTENDED RELEASE ORAL at 09:02

## 2018-05-30 RX ADMIN — CLONAZEPAM 1 MG: 1 TABLET ORAL at 09:02

## 2018-05-30 NOTE — PROGRESS NOTES
Pleasant and cooperative during interaction  Expressed readiness for discharge  Reports spouse is a strong support  Grateful for care and reports improved mood  Denies SI/HI, AH/VH  States she has never experienced her depression as low as it is currently  Will follow up with pain management and orthopaedic upon discharge at [facility]  No questions or concerns

## 2018-05-30 NOTE — DISCHARGE INSTR - LAB
Contact Information: If you have any questions, concerns, pended studies, tests and/or procedures, or emergencies regarding your inpatient behavioral health visit  Please contact [facility] behavioral health unit [phone] and ask to speak to a , nurse or physician  A contact is available 24 hours/ 7 days a week at this number  Summary of Procedures Performed During your Stay:  Below is a list of major procedures performed during your hospital stay and a summary of results:  - No major procedures performed  Pending Studies     None        If studies are pending at discharge, follow up with your PCP and/or referring provider

## 2018-05-30 NOTE — DISCHARGE SUMMARY
Discharge Summary - 1039 [facility] 50 y o  female MRN: [id number]  Unit/Bed#: [facility] 224-84 Encounter: [id number]     Admission Date: 5/23/18        Discharge Date: 5/30/18    Attending Psychiatrist: Jeanne Petty MD    Reason for Admission/HPI:   History of Present Illness   Patient is a 50year old female who presented to [place] [address] ED due to intentional overdose of Lithium (20-30 tablets, unknown dosage)  + ingestion of alcohol  Lithium level was 2 3 and she was admitted to Med-Surge unit until medically stable  Psychiatry was consulted and it was deemed appropriate for inpatient psychiatric hospitalization  Precipitating events were family relationship issues, financial issues, losing health insurance, lack of psychiatric follow up, and episodic abuse of alcohol  When medically cleared patient was transferred to [facility] [place]  Patient reported over the last few weeks an increase in depression with symptoms of poor sleep, lack of appetite, anhedonia, lack of energy, isolating self, inability to concentrate, and irritability  She reported increased anxiety with panic attacks  After detailed discussion with attending patient denied history of manic symptoms, with decreased need for sleep lasting for more than 3 days, with no associated increased energy, grandiosity, rapid speech, racing mind, easy distractibility, or increased goal-directed activity  She denied psychosis or history of PTSD symptoms  Patient does have history of prior inpatient psychiatric hospitalizations, does have history of prior suicide attempts, and does have current outpatient psychiatrist     Psychosocial Stressors: family issues, financial, lack of insurance      Hospital Course:   Behavioral Health Medications:   current meds:   Current Facility-Administered Medications   Medication Dose Route Frequency    ALPRAZolam (XANAX) tablet 1 mg  1 mg Oral Q6H PRN    aluminum-magnesium hydroxide-simethicone (MYLANTA) 200-200-20 mg/5 mL oral suspension 30 mL  30 mL Oral Q4H PRN    benztropine (COGENTIN) injection 1 mg  1 mg Intramuscular Q6H PRN    benztropine (COGENTIN) tablet 1 mg  1 mg Oral Q6H PRN    clonazePAM (KlonoPIN) tablet 1 mg  1 mg Oral BID    gabapentin (NEURONTIN) capsule 400 mg  400 mg Oral TID    haloperidol (HALDOL) tablet 5 mg  5 mg Oral Q8H PRN    haloperidol lactate (HALDOL) injection 5 mg  5 mg Intramuscular Q6H PRN    lisinopril (ZESTRIL) tablet 20 mg  20 mg Oral Daily    LORazepam (ATIVAN) 2 mg/mL injection 1 mg  1 mg Intramuscular Q8H PRN    magnesium hydroxide (MILK OF MAGNESIA) 400 mg/5 mL oral suspension 30 mL  30 mL Oral Daily PRN    mirtazapine (REMERON) tablet 15 mg  15 mg Oral HS    naproxen (NAPROSYN) tablet 375 mg  375 mg Oral BID PRN    nicotine (NICODERM CQ) 14 mg/24hr TD 24 hr patch 1 patch  1 patch Transdermal Daily    nicotine polacrilex (NICORETTE) gum 2 mg  2 mg Oral Q2H PRN    OLANZapine (ZyPREXA) IM injection 5 mg  5 mg Intramuscular Q3H PRN    OLANZapine (ZyPREXA) tablet 5 mg  5 mg Oral Q3H PRN    pantoprazole (PROTONIX) EC tablet 40 mg  40 mg Oral Early Morning    propranolol (INDERAL) tablet 10 mg  10 mg Oral BID    traMADol (ULTRAM) tablet 100 mg  100 mg Oral Q6H PRN    venlafaxine (EFFEXOR-XR) 24 hr capsule 75 mg  75 mg Oral Daily     Patient was admitted to [address] [place] [address] Inpatient Psychiatric Unit on voluntary 201 commitment for safety and stabilization  On admission patient was discontinued from Cymbalta, Tegretol, and Seroquel due to not meeting criteria for bipolar mood disorder  She was then placed on Effexor XR 75mg QD for depression / anxiety management, Remeron 15mg HS for depression / insomnia / appetite management, and Klonopin 1mg BID for anxiety management  Gabapentin 300mg TID was started for pain / anxiety management and it was titrated to Gabapentin 400mg TID  She tolerated medications with no acute side effects    Her mood brightened over the course of her treatment, and she was seen in [facility] interacting appropriately with peers  She did not demonstrate dangerous behavior to self or others during her inpatient stay  On day of discharge patient had reduced depression, controllable anxiety, denied psychosis, did not show signs of terri, and denied suicidal/homicidal ideations  She remained very pain focused and Orthopedics were consulted  It was recommended for outpatient follow up  Mental Status at time of Discharge:     Appearance:  casually dressed   Behavior:  cooperative   Speech:  normal pitch and normal volume   Mood:  euthymic   Affect:  brighter   Thought Process:  normal   Thought Content:  normal  Denied delusions / obsessions   Perceptual Disturbances: None   Risk Potential: Denied SI/HI  Potential for aggression: No   Sensorium:  person, place and time/date   Cognition:  grossly intact   Consciousness:  alert and awake    Attention: attention span and concentration were age appropriate   Insight:  fair   Judgment: fair   Gait/Station: slow   Motor Activity: no abnormal movements     Discharge Diagnosis:   Severe episode of recurrent major depressive disorder, without psychotic features   Generalized anxiety disorder    Discharge Medications:  See after visit summary for reconciled discharge medications provided to patient and family  Discharge instructions/Information to patient and family:   See after visit summary for information provided to patient and family  Provisions for Follow-Up Care:  See after visit summary for information related to follow-up care and any pertinent home health orders  Discharge Statement   I spent 33 minutes discharging the patient  This time was spent on the day of discharge  I had direct contact with the patient on the day of discharge    On day of discharge patient had mental status exam performed, discharge instructions/medications reviewed, and outpatient planning discussed  She was given 1 week of scripts  She is to start [facility] tomorrow for further psychiatric follow up        Ashok Deras PA-C

## 2018-05-30 NOTE — DISCHARGE INSTRUCTIONS
Depression   WHAT YOU NEED TO KNOW:   Depression is a medical condition that causes feelings of sadness or hopelessness that do not go away  Depression may cause you to lose interest in things you used to enjoy  These feelings may interfere with your daily life  DISCHARGE INSTRUCTIONS:   Call 911 for any of the following:   · You think about harming yourself or someone else  Contact your healthcare provider if:   · Your symptoms do not improve  · You cannot make it to your next appointment  · You have new symptoms  · You have questions or concerns about your condition or care  Medicines:   · Antidepressants  may be given to improve or balance your mood  You may need to take this medicine for several weeks before you begin to feel better  · Take your medicine as directed  Contact your healthcare provider if you think your medicine is not helping or if you have side effects  Tell him of her if you are allergic to any medicine  Keep a list of the medicines, vitamins, and herbs you take  Include the amounts, and when and why you take them  Bring the list or the pill bottles to follow-up visits  Carry your medicine list with you in case of an emergency  Therapy  may be used to treat your depression  A therapist will help you learn to cope with your thoughts and feelings  This can be done alone or in a group  It may also be done with family members or a significant other  Self-care:   · Get regular physical activity  Try to exercise for 30 minutes, 3 to 5 days a week  Work with your healthcare provider to develop an exercise plan that you enjoy  Physical activity may improve your symptoms  · Get enough sleep  Create a routine to help you relax before bed  You can listen to music, read, or do yoga  Try to go to bed and wake up at the same time every day  Sleep is important for emotional health  · Eat a variety of healthy foods from all of the food groups    A healthy meal plan is low in fat, salt, and added sugar  Ask your healthcare provider for more information about a meal plan that is right for you  · Do not drink alcohol or use drugs  Alcohol and drugs can make your symptoms worse  Follow up with your healthcare provider as directed: Your healthcare provider will monitor your progress at follow-up visits  He or she will also monitor your medicine if you take antidepressants  Your healthcare provider will ask if the medicine is helping  Tell him or her about any side effects or problems you may have with your medicine  The type or amount of medicine may need to be changed  Write down your questions so you remember to ask them during your visits  © 2017 [address] Information is for End User's use only and may not be sold, redistributed or otherwise used for commercial purposes  All illustrations and images included in CareNotes® are the copyrighted property of A D A M , Inc  or Alonso Garcia  The above information is an  only  It is not intended as medical advice for individual conditions or treatments  Talk to your doctor, nurse or pharmacist before following any medical regimen to see if it is safe and effective for you  Generalized Anxiety Disorder   WHAT YOU NEED TO KNOW:   Generalized anxiety disorder (ALVIN) is a condition that causes you to feel worried or nervous for at least 6 months  The anxiety may be much more severe than the event causing it  You may not be able to do your daily activities because of the anxiety  DISCHARGE INSTRUCTIONS:   Call 911 for any of the following:   · You feel like hurting yourself or someone else  · You have chest pain, tightness, or heaviness that may spread to your shoulders, arms, jaw, neck, or back  Seek care immediately if:   · You feel like fainting or are lightheaded or too dizzy to stand up  Contact your healthcare provider if:   · You have new symptoms since your last visit      · Your anxiety keeps you from doing your daily activities, such as self-care, family, or work  · You have problems that you think may be caused by the medicine you are taking  · Your symptoms are getting worse  · You have questions or concerns about your condition or care  Medicines:   · Medicines  may be given to relieve anxiety and depression and help you feel calm and relaxed  · Take your medicine as directed  Contact your healthcare provider if you think your medicine is not helping or if you have side effects  Tell him or her if you are allergic to any medicine  Keep a list of the medicines, vitamins, and herbs you take  Include the amounts, and when and why you take them  Bring the list or the pill bottles to follow-up visits  Carry your medicine list with you in case of an emergency  Follow up with your healthcare provider as directed:  Write down your questions so you remember to ask them during your visits  Monitor your condition:  Keep a record of the situations that cause your symptoms  Bring the record with you when you see your healthcare provider  Include the following:  · What were you doing when the symptoms started? · Had you eaten anything unusual, or taken a new medicine or herbal supplement? · Were you stressed or upset during the time leading up to the attack? · How often do you have symptoms? How long do they last?    · What were your thoughts and feelings during these situations? · What symptoms did you have? · Did anything help ease or stop the symptoms, such as a relaxation technique? Self-care:   · Limit or do not drink caffeine  Caffeine can increase your heartbeat and make your anxiety symptoms worse  · Limit or do not drink alcohol  Ask your healthcare provider if alcohol is safe for you  You may not be able to drink alcohol if you take certain anxiety or depression medicines  Alcohol can also increase depression   Limit alcohol to 1 drink per day if you are a woman  Limit alcohol to 2 drinks per day if you are a man  A drink of alcohol is 12 ounces of beer, 5 ounces of wine, or 1½ ounces of liquor  · Manage stress  Stress may increase symptoms of ALVIN  Relaxation therapy teaches you how to feel less physical and emotional stress  Deep breathing, muscle relaxation, and music are some forms of relaxation therapy  · Avoid hyperventilating  Some people may hyperventilate during an anxiety attack and not even notice it  Hyperventilation means that your breaths are too fast and shallow  Breathing this way can cause numbness or tingling in your hands and lips  During an anxiety attack, focus on taking very slow, deep breaths  Your healthcare provider may show you how to breathe in and out of a paper bag when you hyperventilate  Never use a plastic bag  © 2017 [address] is for End User's use only and may not be sold, redistributed or otherwise used for commercial purposes  All illustrations and images included in CareNotes® are the copyrighted property of [employer]  or Alonso Garcia  The above information is an  only  It is not intended as medical advice for individual conditions or treatments  Talk to your doctor, nurse or pharmacist before following any medical regimen to see if it is safe and effective for you

## 2018-05-30 NOTE — CASE MANAGEMENT
CM met w/ PT today  CM reviewed PT's D/C plan, transportation, and aftercare services  PT expresses readiness for D/C at this time and verbalized being in support of D/C today, D/C plan, and aftercare services

## 2018-05-30 NOTE — PSYCH
Subjective:     Patient ID: Esperanza Mckeon is a 50 y o  female  [facility] Clinical Progress Notes      Specialized Services Documentation  Therapist must complete separate progress note for each specific clinical activity in which the individual participated during the day  Group Psychotherapy [id number]  Matthew Waggoner participated in [facility] group focused on utilizing positive thinking every day to improve overall mental health as well as physical wellness  Explanations of why positive thinking works as  well as examples of exercises that can be undertaken to become skillful at positive thinking, were reviewed  Matthew Waggoner actively shared in education and peer exercise identifying her personal strengths  She was able to join in peer discussion of using cognitive techniques of concentrating on positive thoughts to decrease negative thinking that feeds depression  Matthew Waggoner made moderate beginning progress toward goals  Continue to offer this group to provide this cognitive and behavioral strategies to improve mental and physical functioning  1 1,1 2 Tx Plan Objective:   Therapist:  Azael Wood

## 2018-05-31 ENCOUNTER — OFFICE VISIT (OUTPATIENT)
Dept: PSYCHIATRY | Facility: CLINIC | Age: 48
End: 2018-05-31
Payer: COMMERCIAL

## 2018-05-31 ENCOUNTER — OFFICE VISIT (OUTPATIENT)
Dept: PSYCHOLOGY | Facility: CLINIC | Age: 48
End: 2018-05-31
Payer: COMMERCIAL

## 2018-05-31 VITALS
HEIGHT: 66 IN | WEIGHT: 167.6 LBS | SYSTOLIC BLOOD PRESSURE: 112 MMHG | BODY MASS INDEX: 26.93 KG/M2 | DIASTOLIC BLOOD PRESSURE: 82 MMHG | HEART RATE: 88 BPM

## 2018-05-31 DIAGNOSIS — F33.2 SEVERE EPISODE OF RECURRENT MAJOR DEPRESSIVE DISORDER, WITHOUT PSYCHOTIC FEATURES (HCC): Primary | ICD-10-CM

## 2018-05-31 DIAGNOSIS — F41.1 GENERALIZED ANXIETY DISORDER: ICD-10-CM

## 2018-05-31 PROCEDURE — 90791 PSYCH DIAGNOSTIC EVALUATION: CPT

## 2018-05-31 PROCEDURE — 90791 PSYCH DIAGNOSTIC EVALUATION: CPT | Performed by: PSYCHIATRY & NEUROLOGY

## 2018-05-31 PROCEDURE — H0035 MH PARTIAL HOSP TX UNDER 24H: HCPCS
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
## 2018-05-31 NOTE — PSYCH
Subjective:     Patient ID: Negrita Peguero is a 50 y o  female  [facility] Clinical Progress Notes      Specialized Services Documentation  Therapist must complete separate progress note for each specific clinical activity in which the individual participated during the day  Group Psychotherapy   [phone]) Krunal Lucia was excused from psychotherapy group for initial evaluation with psychiatrist   Tx Plan Objective: n/a, Therapist:  Clarissa LESTER    Case Management Note  [phone]) This CM met with Krunal Lucia for initial assessment  ROIs signed for emergency contact and PCP  TAR form will be sent to [address] [place] [address] today for review  Program basics explained, and on-call/crisis numbers provided  Denies SI HI and psychosis today  Clarissa LESTER    Current suicide risk : Low     Medications changes/added/denied? No    Treatment session number: 1    Individual Case Management Visit provided today?  Yes     [facility] follow up physician's orders: n/a

## 2018-05-31 NOTE — PSYCH
Subjective:     Patient ID: Gil Angela is a 50 y o  female  [facility] Clinical Progress Notes      Specialized Services Documentation  Therapist must complete separate progress note for each specific clinical activity in which the individual participated during the day  Allied Therapy   [id number] Mitch Rodriguez actively shared in [facility] group focused on distress tolerance and self soothing  She was observed to be engaged in therapist led relaxation exercises  She reported benefiting from exercise  Group discussed specific items that could help self soothe if in an anxiety crisis with encouragement to use these things regularly to manage stressors consistently  She identified she would put a good book in her relaxation kit  Some beginning effort noted toward tx goal   Continue AT to encourage development of wellness skills and consistent practice      Tx Plan Objective: 1 1, Therapist:  Chante WU    Education Therapy   Time:  8677-6975  Previous goal met: first treatment day  Readiness to Learning: Receptive  Barriers to Learning: None  Learning Assessment  Time: 7223-1230  Education Completed: Illness, Medication and Wellness Tools  Teaching Method: Verbal and Demonstration  Shared Area of Learning: Yes   Goal Set: work on sleep hygiene  Tx Plan Objective: 1 1, Therapist:  Chante WU

## 2018-05-31 NOTE — PSYCH
Subjective:     Patient ID: Renetta Marino is a 50 y o  female  [facility] Clinical Progress Notes      Specialized Services Documentation  Therapist must complete separate progress note for each specific clinical activity in which the individual participated during the day  Group Psychotherapy [id number]  Yoseph participated in weekly wellness group to discuss what particular area of wellness  has been worked on up to today in Program and choice of area to continue working on for recovery as targeted in treatment plan, by choice, or in [place] [employer]  Yoseph shared how her  and she have financial problems and she has chronic pain so it is difficult for her to "do anything " Discussed how she can begin to use cognitive techniques to look at her life differently to be more positive and productive rather than focusing on "what we don't have " Yoseph was able to understand and accept peer feedback and encouragement to start to integrate more positive activities into her daily life "that don't cost money and will not tax her physically " Yoseph made moderate beginning progress toward goals  Continue to offer weekly wellness group to focus on goals and identify areas of goal achievement and need     1 1,1 2,1 4  Tx Plan Objective: , Therapist:  Paulette Tsai RN

## 2018-05-31 NOTE — PSYCH
Assessment/Plan:      Diagnoses and all orders for this visit:    Severe episode of recurrent major depressive disorder, without psychotic features ([facility] [address] )    Generalized anxiety disorder          Subjective:     Patient ID: Eladio Snowden is a 50 y o  female  [facility] Treatment Plan   AREAS OF NEED: depression and anxiety as evidenced by low mood, irritability, social isolation, appetite and sleep disturbances, poor concentration, [panic attacks and suicidal ideation with attempt by overdose  Date Initiated: 5/31/2018    Strengths: good support system, highly motivated to learn coping skills and continue to work toward recovery    LONG TERM GOAL:   1 0 I will identify three signs that my anxiety and depression have improved, and that I am able to manage my daily stressors in healthier ways  Date Initiated: 5/31/2018  Target Date: 6/28/2018    Completion Date:     SHORT TERM OBJECTIVES:   1 1  I will identify at least two new coping skills that I will learn in program that help to reduce my symptoms of anxiety and depression, and will practice at least one of these skills daily  Date Initiated: 5/31/2018  Revision Date:  Target Date: 6/11/2018  Completion Date:     1 2 I will identify two activities that I enjoy, such as gardening or a volunteer opportunity, that I can schedule into my time outside of program, in order to begin to build structure and purpose into my days  Date Initiated: 5/31/2018  Revision Date:  Target Date: 6/11/2018  Completion Date:    1 3 I will take my medication as prescribed, and I will talk to staff about any questions or concerns that may arise  Date Initiated: 5/31/2018  Revision Date:   Target Date: 6/11/2018  Completion Date:     1 4 I will identify two ways that my supports can help me with my recovery, and will reach out to supports/program staff should I need help    Date Initiated: 5/31/2018  Revision Date:  Target Date: 6/11/2018  Completion Date:         7 DAY REVISION:    Date Initiated:  Revision Date:   Target Date:   Completion Date:    PSYCHIATRY:  Medication management and education  Date Initiated: 2018       Revision Date:   The person(s) responsible for carrying out the plan is Navin Pickering MD; Kassandra Costa MD    NURSIN 1, 1 2, 1 3, 1 4 Provide daily wellness group to educate Desi Nguyen on the signs and symptoms of diagnosis and medications used in treatment  Date Initiated: 2018  Revision Date:  The person(s) responsible for carrying out the plan is Khloe Brown RN    PSYCHOLOGY:   1 1, 1 2, 1 4 Provide psychotherapy group 5 times per week to allow opportunity for Desi Nguyen to explore stressors, build coping skills and relate to peers regarding similar issues  Date Initiated: 2018  Revision Date:   The person(s) responsible for carrying out the plan is TREVON Raymundo    ALLIED THERAPY:   1 1, 1 2 Engage Mireya Short in AT group 5 times daily to encourage development and use of wellness tools to decrease symptoms and promote recovery through meaningful activity  Date Initiated: 2018  Revision Date:   The person(s) responsible for carrying out the plan is BRYCE Roy    CASE MANAGEMENT:   1 0  This  will meet with Desi Nguyen 3-4 times weekly to assess treatment progress, discharge planning, connection to community supports and UR as indicated  Date Initiated: 2018  Revision Date:   The person(s) responsible for carrying out the plan is TREVON Raymundo      TREATMENT REVIEW/COMMENTS:     DISCHARGE CRITERIA: Identify three signs of improvement and complete relapse prevention plan  DISCHARGE PLAN: Refer to outpatient psychiatry and therapy  Estimated Length of Stay: 10 treatment days      Diagnosis and Treatment Plan explained to Eva Terra relates understanding diagnosis and is agreeable to Treatment Plan             CLIENT COMMENTS / Please share your thoughts, feelings, need and/or experiences regarding your treatment plan: _____________________________________________________________________________________________________________________________________________________________________________________________________________________________________________________________________________________________________________________ Date/Time: ______________     Patient Signature: _________________________________     Date/Time: ______________      Signature: _________________________________     Date/Time: ______________

## 2018-06-01 ENCOUNTER — OFFICE VISIT (OUTPATIENT)
Dept: PSYCHOLOGY | Facility: CLINIC | Age: 48
End: 2018-06-01
Payer: COMMERCIAL

## 2018-06-01 DIAGNOSIS — F41.1 GENERALIZED ANXIETY DISORDER: ICD-10-CM

## 2018-06-01 DIAGNOSIS — F33.2 SEVERE EPISODE OF RECURRENT MAJOR DEPRESSIVE DISORDER, WITHOUT PSYCHOTIC FEATURES (HCC): Primary | ICD-10-CM

## 2018-06-01 PROCEDURE — H0035 MH PARTIAL HOSP TX UNDER 24H: HCPCS

## 2018-06-01 NOTE — PSYCH
Subjective:     Patient ID: Pati Cochran is a 50 y o  female  [facility] Clinical Progress Notes      Specialized Services Documentation  Therapist must complete separate progress note for each specific clinical activity in which the individual participated during the day  Group Psychotherapy   [id number] Shefali Haynes participated in psychotherapy group focused on using "opposite action" skill to take action to work toward wellness, as well as prioritizing one's self-care  Shefali Haynes identified her father's surgery being more intense than expected as a stressor  She actively engaged in group discussion, talking about how she always puts other people's needs first, especially her family, and does not take care of herself  Group discussed the importance of being able to say no to others at times, and the benefits of prioritizing one's needs  Shefali Haynes seemed tired during the second half of the group and at one point appeared to be asleep, curled over her lap with her head in her hands  Some mild progress noted toward goals today  Continue psychotherapy group to encourage Shefali Haynes to explore stressors and coping  Tx Plan Objective: 1 1, 1 2, Therapist:  Michael Calloway [facility]    Case Management Note  [id number]  This CM met with Shefali Haynes to review treatment plan  She signed and received copy of same  Shefali Haynes stated that she has not slept much at all the last two nights, waking up and having difficulty falling back asleep due to racing thoughts  This CM advised that Dr Marybel Cruz would be consulted about meds at bedtime (after speaking with Dr Renay Morrell was advised to cut Remeron tablet in half at bedtime to see if that helps with sleep, and if not, to discontinue it completely)  She also stated that her father's surgery did not go as planned, and he will be in the hospital for another week instead of being moved to a rehab facility    Nuris's mother is complaining of being exhausted trying to be at the hospital with her dad, make the long drive there and back, and trying to keep up with her housework  Krunal Lucia stated that she is concerned for both of her parents' health, and needs to be there to help them  She stated that she wants to stay in program, but feels that doing so will make her more depressed again because of her worry over her parents  Discussed discharge as an option, and she stated that she would come to program one more day on Monday to process her discharge, but would then like to be able to be available to help her mother  She states that she learned a lot in the inpatient unit that will help her cope, and will contact program in a few weeks if she feels she needs to return  This CM provided her with a list of clinics with both psychiatrists and therapists that take her insurance (four options highlighted) and encouraged Krunal Lucia to call and schedule an intake with one of them ASAP  She also noted that her  was making an appointment with her PCP for next week to get refills on her psych meds  Clarissa Plank MSW    Current suicide risk : Low     Medications changes/added/denied? Yes (Dr Nilda Garcia instructed Krunal Lucia to reduce Remeron to 1/2 tablet at bedtime due to difficulty sleeping)    Treatment session number: 2    Individual Case Management Visit provided today?  Yes     [facility] follow up physician's orders: n/a

## 2018-06-01 NOTE — PSYCH
Subjective:     Patient ID: Daniel Hansen is a 50 y o  female  [facility] Clinical Progress Notes      Specialized Services Documentation  Therapist must complete separate progress note for each specific clinical activity in which the individual participated during the day  Allied Therapy   [id number] Kelsey Collins actively shared in [facility] group exploring DBT skill changing emotional responses  She engaged easily in task sharing triggers and responses to given emotions  Group explored differences between justified and unjustified emotions to prompting events and effective versus ineffective responses  With weekend approach, group explored risk of feeling lazy and she was able to given an example why laziness may be a risk for her  Group reviewed skill Opposite Action  ie  feel lazy  get active and productive weekend choices offered  Some beginning effort and progress noted toward goals  Continue AT to explore healthy emotional regulation and role in practicing wellness tools     Tx Plan Objective: 1 1,1 2, Therapist:  Jason WU    Education Therapy   Time:  2016-0066  Previous goal met: Yes   Readiness to Learning: Receptive  Barriers to Learning: None  Learning Assessment  Time: 7661-8586  Education Completed: Illness and Wellness Tools  Teaching Method: Verbal and Written  Shared Area of Learning: Yes   Goal Set: sleep - Kelsey Collins was given DBT Sleep hygiene protocol   Tx Plan Objective: 1 1, Therapist:  Jason WU

## 2018-06-04 ENCOUNTER — OFFICE VISIT (OUTPATIENT)
Dept: PSYCHOLOGY | Facility: CLINIC | Age: 48
End: 2018-06-04
Payer: COMMERCIAL

## 2018-06-04 ENCOUNTER — OFFICE VISIT (OUTPATIENT)
Dept: PSYCHIATRY | Facility: CLINIC | Age: 48
End: 2018-06-04
Payer: COMMERCIAL

## 2018-06-04 DIAGNOSIS — F33.2 SEVERE EPISODE OF RECURRENT MAJOR DEPRESSIVE DISORDER, WITHOUT PSYCHOTIC FEATURES (HCC): Primary | ICD-10-CM

## 2018-06-04 DIAGNOSIS — F41.1 GENERALIZED ANXIETY DISORDER: ICD-10-CM

## 2018-06-04 PROBLEM — T56.891A LITHIUM OVERDOSE: Status: RESOLVED | Noted: 2018-05-20 | Resolved: 2018-06-04

## 2018-06-04 PROBLEM — R45.89 SUICIDAL BEHAVIOR: Status: RESOLVED | Noted: 2018-05-20 | Resolved: 2018-06-04

## 2018-06-04 PROCEDURE — 99213 OFFICE O/P EST LOW 20 MIN: CPT | Performed by: PSYCHIATRY & NEUROLOGY

## 2018-06-04 PROCEDURE — H0035 MH PARTIAL HOSP TX UNDER 24H: HCPCS

## 2018-06-04 RX ORDER — MIRTAZAPINE 15 MG/1
TABLET, FILM COATED ORAL
Qty: 30 TABLET | Refills: 0 | Status: SHIPPED | OUTPATIENT
Start: 2018-06-04 | End: 2018-10-31 | Stop reason: HOSPADM

## 2018-06-04 NOTE — PSYCH
[facility] Discharge Instructions:   Disposition: home  Address[place] [address] [facility] [address]  Diagnosis:Major Depression  Allergies (Drug/Food): Allergies   Allergen Reactions    Ibuprofen Other (See Comments)    Penicillins Other (See Comments)     ? hives    Sulfa Antibiotics Other (See Comments)     sloughing skin in mouth    Sulfasalazine      Activity: you may resume normal activities  Diet:no recommendations  Smoking Cessation: The best thing you can do to improve your health is to stop using tobacco  Diagnostic/Laboratory Orders: n/a  Vaccines: If you received a vaccine, please notify your family physician on your next visit  For more information, please call [phone]  Follow-up appointments/Referrals: Follow up with one of recommended agencies for medication management and therapy    ICM/CTT: n/a  [facility]: [place] [phone] and [place] [phone]  Intake/Referral/Evaluation (Non-Emergency) *NON INSURED FOR FUNDING: [facility]: [facility] (Emergency) [place] Service: [facility]: [address] [place] [address] [address]: [phone]  I, the undersigned, have received and understand the above instructions          Patient/Rep Signature: __________________________________       Date/Time: ______________         Physician Signature: ____________________________________      Date/Time: ______________               Signature: ________________________________       Date/Time: ______________

## 2018-06-04 NOTE — PSYCH
Subjective:     Patient ID: Gil Angela is a 50 y o  female  [facility] Clinical Progress Notes      Specialized Services Documentation  Therapist must complete separate progress note for each specific clinical activity in which the individual participated during the day  Group Psychotherapy  ([phone]) Mitch Rodriguez participated in psychotherapy group focused on coping strategies that can help during crises  Mitch Rodriguez identified her mother and father as a stressor  She actively engaged in group discussion, relating to peers that talked about struggling with deep depression and finding ways to cope  She said that she often thinks about her family to help her cope with times when she feels very depressed or other challenging times in his life  Group discussed various coping techniques as well as finding purpose for one's life as ways to help motivate one to push through difficult times  Moderate progress noted toward goals  Discharge at the end of program day today  Tx Plan Objective: 1 1, 1 2, Therapist:  Cori LESTER    Case Management Note  ([id number]) This CM met with Mitch Rodriguez to review discharge instructions, medication reconciliation sheet and relapse prevention plan  She did not have relapse prevention plan with her, so she was given another copy and asked to complete it by the end of the day  She also did not have list of outpatient clinics that was provided to her on Friday, so another copy was provided, with 4 agencies highlighted ([facility])  She denies SI, HI and psychosis, and stated that she has an appointment with her PCP on Wed at 5:30  Her PCP is willing to manage her meds until she can get in to see a psychiatrist   Encouraged her to call the agencies on list [facility] to get in with a therapist and then to a doctor  Cori LESTER    Current suicide risk : Low     Medications changes/added/denied?  No    Treatment session number: 3    Individual Case Management Visit provided today?  Yes     [facility] follow up physician's orders: see separate note for discharge order

## 2018-06-04 NOTE — PSYCH
Subjective: " I have a lot on my plate"     Patient ID: Genie Buerger is a 50 y o  female who was admitted to the Partial Hospitalization Program 5/31/2018  She felt she had made a lot of improvements in the hospital and was hoping to continue to deal with her problems and develop more coping skills  Since she came, her father had back surgery and is not doing well  Her mother is with her father at all times, and Jessica De Luna is getting more anxious because she is not able to help her mother because she is in the   Program   She still feels like her depression is under control and what she has a lot of anxiety and even misplaced her Remeron medication she still believes that she is doing well and has the motivation and enthusiasm to help her mother  We talked about her new diagnosis of depression, the medications that she is on in that she if she feels like her thoughts are racing and she is not able to sleep she needs reported that to the psychiatrist or her PCP  She denied presence side effects from her medication except for some problems forgetting were to put things or if she is not paying attention she is not focus on what she is doing she feels like her immediate recall is not as good  That could be secondary to Klonopin and does why it is important to keep the does wear is add 1 milligram twice a day  I even suggested to her she could take half a tablet in the morning half a tablet in the afternoon and one at bedtime  At this time Jessica De Luna feels like she is not a danger to herself or others she has done well, she does not have any side effects from medications and can continue with same    She is safe to be discharged from the program     HPI ROS Appetite Changes and Sleep: normal appetite, normal energy level, no weight change and normal number of sleep hours    Review Of Systems:     Mood Anxiety and denied depression   Behavior cooperative   Thought Content concern about her father   General Relationship Problems, Emotional Problems and Sleep Disturbances   Personality anxious   Other Psych Symptoms some racing thoughts if she does not sleep at night   Constitutional Feeling Tired   ENT Negative   Cardiovascular Negative   Respiratory Negative   Gastrointestinal Negative   Genitourinary Negative   Musculoskeletal Negative   Integumentary Normal    Neurological Normal    Endocrine Normal    Other Symptoms Normal              Laboratory Results: No results found for this or any previous visit  Substance Abuse History:  History   Drug Use No       Family Psychiatric History: No family history on file  The following portions of the patient's history were reviewed and updated as appropriate: allergies, current medications, past family history, past medical history, past social history, past surgical history and problem list     Social History     Social History    Marital status: /Civil Union     Spouse name: N/A    Number of children: N/A    Years of education: N/A     Occupational History    Not on file       Social History Main Topics    Smoking status: Current Every Day Smoker     Packs/day: 1 00     Types: Cigarettes    Smokeless tobacco: Current User    Alcohol use Yes      Comment: vodka and beer, has decreased to occasionally    Drug use: No    Sexual activity: Not on file      Comment: PT is      Other Topics Concern    Not on file     Social History Narrative    No narrative on file     Social History     Social History Narrative    No narrative on file       Objective:       Mental status:  Appearance calm and cooperative    Mood mood appropriate   Affect affect appropriate    Speech a normal rate and And rhythm   Thought Processes coherent/organized   Hallucinations no hallucinations present    Thought Content no delusions   Abnormal Thoughts no suicidal thoughts  and no homicidal thoughts    Orientation  oriented to person and place and time   Remote Memory long term memory intact and has been a bit more forgetful than usual   Attention Span Slightly decreased   Intellect Appears to be of Average Intelligence   Insight Improving   Judgement Improved   Muscle Strength Muscle strength and tone were normal   Language no difficulty naming common objects   Fund of Knowledge displays adequate knowledge of current events   Pain moderate to severe   Pain Scale 3       Assessment/Plan:  Patient participated well during the days that she was here  She now feels that she needs to help her mom while her father is recuperating, otherwise her anxiety would get worse  She has asked if she could come back and we certainly said to call us back if she feels she needs to once her father settles  I reviewed with her her medications and she has enough clonazepam 1 milligram twice a day until she sees her primary care physician on Wednesday  She also has enough Effexor  She seems to have misplaced her Remeron and I will give her a 30 day supply of 15 milligram to take half to one at bedtime  She denied any thoughts or plans to harm herself and she feels she has done a lot better since she was hospitalized      There are no diagnoses linked to this encounter  Treatment Recommendations- Risks Benefits Discussed     Immediate Medical/Psychiatric/Psychotherapy Treatments and Any Precautions:Discussed    Risks, Benefits And Possible Side Effects Of Medications:  {Discussed    Controlled Medication Discussion: Discussed with patient Black Box warning on concurrent use of benzodiazepines and opioid medications including sedation, respiratory depression, coma and death  Patient understands the risk of treatment with benzodiazepines in addition to opioids and wants to continue taking those medications  Psychotherapy Provided: Individual psychotherapy provided       Goals discussed in session:  Medication management, Assessment and renewal of medication prior to her discharged    Counseling provided: 21

## 2018-06-04 NOTE — PSYCH
Subjective:     Patient ID: Beto Palomo is a 50 y o  female  [facility] Discharge Summary:   Admission Date: 5/31/2018  Patient was referred by Maite Cartagena  Discharge Date: 6/4/2018  Was this a routine discharge? yes   Diagnosis: Axis I:   1  Severe episode of recurrent major depressive disorder, without psychotic features ([facility] [address] )     2  Generalized anxiety disorder        Treating Physician: Mee Andres MD  Treatment Complications: father's back surgery and Nuris's need to help her parents prevented her from staying in program  Presenting Need: depression and anxiety, with intentional overdose on Lithium and alcohol; feeling overwhelmed, hopeless, poor concentration, poor sleep  Course of treatment includes:    group counseling, medication management, individual case management, allied therapy, psychoeducation and psychiatric evaluation     Treatment Progress: Justin Seay was only able to participate in three days of PHP  During that time, her treatment plan goals included skill building in distress tolerance and healthy communication/boundary setting, as well as medication management to control symptoms  Justin Seay actively engaged with her peers during group discussions, and shared readily with program staff  Her father had major back surgery two days prior to her admission to [facility], and per her report, it was more difficult than expected  Due to her concern for her parents' welfare and her anxiety about not being available to help them while in program, she requested to be discharged on her third treatment day  She did report gaining healthier coping skills from both her inpatient stay as well as her brief stay in PHP  She denies SI HI and psychosis upon discharge      Aftercare recommendations include: Medication management with PCP (appointment 6/6/18 at 5:30) until seen by a psychiatrist   List of agencies that have both therapists and psychiatrists that accept medical assistance provided to [employer], [facility], [employer] [employer] [facility])    Discharge Medications include:  Current Outpatient Prescriptions:     budesonide-formoterol (SYMBICORT) 160-4 5 mcg/act inhaler, Inhale 2 puffs 2 (two) times a day, Disp: , Rfl:     clonazePAM (KlonoPIN) 1 mg tablet, Take 1 tablet (1 mg total) by mouth 2 (two) times a day At 9AM and 6PM, Disp: 14 tablet, Rfl: 0    fluticasone (FLONASE) 50 mcg/act nasal spray, 2 sprays into each nostril daily Not taking recently , Disp: , Rfl:     gabapentin (NEURONTIN) 400 mg capsule, Take 1 capsule (400 mg total) by mouth 3 (three) times a day At 9AM, 4PM, and 9PM, Disp: 21 capsule, Rfl: 0    lisinopril (ZESTRIL) 20 mg tablet, Take 1 tablet (20 mg total) by mouth daily At 9AM, Disp: 14 tablet, Rfl: 1    mirtazapine (REMERON) 15 mg tablet, Take 1/2 to one tablet at bedtime, Disp: 30 tablet, Rfl: 0    omeprazole (PriLOSEC) 40 MG capsule, Take 1 capsule by mouth Daily, Disp: , Rfl:     propranolol (INDERAL) 10 mg tablet, Take 1 tablet (10 mg total) by mouth 2 (two) times a day At 9AM and 6PM, Disp: 30 tablet, Rfl: 1    traMADol (ULTRAM) 50 mg tablet, Take 2 tablets (100 mg total) by mouth every 8 (eight) hours as needed for moderate pain, Disp: 30 tablet, Rfl: 1    venlafaxine (EFFEXOR-XR) 75 mg 24 hr capsule, Take 1 capsule (75 mg total) by mouth daily At 9AM, Disp: 7 capsule, Rfl: 0

## 2018-06-04 NOTE — PSYCH
Assessment/Plan:      There are no diagnoses linked to this encounter  Subjective:     Patient ID: Mireya Gonsalves is a 50 y o  female  [facility] Treatment Plan   AREAS OF NEED: depression and anxiety as evidenced by low mood, irritability, social isolation, appetite and sleep disturbances, poor concentration, [panic attacks and suicidal ideation with attempt by overdose  Date Initiated: 5/31/2018    Strengths: good support system, highly motivated to learn coping skills and continue to work toward recovery    LONG TERM GOAL:   1 0 I will identify three signs that my anxiety and depression have improved, and that I am able to manage my daily stressors in healthier ways  Date Initiated: 5/31/2018  Target Date: 6/28/2018    Completion Date: 6/4/2018    SHORT TERM OBJECTIVES:   1 1  I will identify at least two new coping skills that I will learn in program that help to reduce my symptoms of anxiety and depression, and will practice at least one of these skills daily  Date Initiated: 5/31/2018  Revision Date:  Target Date: 6/11/2018  Completion Date: 6/4/2018    1 2 I will identify two activities that I enjoy, such as gardening or a volunteer opportunity, that I can schedule into my time outside of program, in order to begin to build structure and purpose into my days  Date Initiated: 5/31/2018  Revision Date:  Target Date: 6/11/2018  Completion Date: 6/4/2018    1 3 I will take my medication as prescribed, and I will talk to staff about any questions or concerns that may arise  Date Initiated: 5/31/2018  Revision Date:   Target Date: 6/11/2018  Completion Date: 6/4/2018    1 4 I will identify two ways that my supports can help me with my recovery, and will reach out to supports/program staff should I need help    Date Initiated: 5/31/2018  Revision Date:  Target Date: 6/11/2018  Completion Date: 6/4/2018         7 DAY REVISION:    Date Initiated:  Revision Date:   Target Date:   Completion Date:    PSYCHIATRY:  Medication management and education  Date Initiated: 2018       Revision Date:   The person(s) responsible for carrying out the plan is Jaylen Tran MD; Sergio Arce MD    NURSIN 1, 1 2, 1 3, 1 4 Provide daily wellness group to educate Mitch Rodriguez on the signs and symptoms of diagnosis and medications used in treatment  Date Initiated: 2018  Revision Date:  The person(s) responsible for carrying out the plan is Chloe Castellanos RN    PSYCHOLOGY:   1 1, 1 2, 1 4 Provide psychotherapy group 5 times per week to allow opportunity for Mitch Rodriguez to explore stressors, build coping skills and relate to peers regarding similar issues  Date Initiated: 2018  Revision Date:   The person(s) responsible for carrying out the plan is TREVON Sher    ALLIED THERAPY:   1 1, 1 2 Engage Gil Angela in AT group 5 times daily to encourage development and use of wellness tools to decrease symptoms and promote recovery through meaningful activity  Date Initiated: 2018  Revision Date:   The person(s) responsible for carrying out the plan is BRYCE Worthy    CASE MANAGEMENT:   1 0  This  will meet with Mitch Rodriguez 3-4 times weekly to assess treatment progress, discharge planning, connection to community supports and UR as indicated  Date Initiated: 2018  Revision Date:   The person(s) responsible for carrying out the plan is TREVON Sher      TREATMENT REVIEW/COMMENTS:     DISCHARGE CRITERIA: Identify three signs of improvement and complete relapse prevention plan  DISCHARGE PLAN: Refer to outpatient psychiatry and therapy  Estimated Length of Stay: 10 treatment days      Diagnosis and Treatment Plan explained to Marie Black relates understanding diagnosis and is agreeable to Treatment Plan             CLIENT COMMENTS / Please share your thoughts, feelings, need and/or experiences regarding your treatment plan: _____________________________________________________________________________________________________________________________________________________________________________________________________________________________________________________________________________________________________________________ Date/Time: ______________     Patient Signature: _________________________________     Date/Time: ______________      Signature: _________________________________     Date/Time: ______________

## 2018-06-04 NOTE — PSYCH
Subjective:     Patient ID: Negrita Peguero is a 50 y o  female  [facility] Clinical Progress Notes      Specialized Services Documentation  Therapist must complete separate progress note for each specific clinical activity in which the individual participated during the day  Allied Therapy   [id number] Krunal Lucia actively shared in [facility] group focused on relaxation techniques and mindfulness strategies  She engaged in PMR and breathing techniques  Group explored practice of how to practice mindfulness through observing, describing and participating in a sahil listening and then engaging in song  She was able to identify gaining from relaxation exercise and to slow her thoughts  Group discussed ways to applying mindfulness can help to make more effective choices and lessen depression and anxiety  Some effort noted toward treatment goal   Discharge at the end of treatment day       Tx Plan Objective: 1 1, Therapist:  Claribel WU    Education Therapy   Time:  8758-0157  Previous goal met: No  Readiness to Learning: Receptive  Barriers to Learning: None  Learning Assessment  Time: 8565-7722  Education Completed: Illness, Medication, Aftercare and Wellness Tools  Teaching Method: Verbal, Written and Demonstration  Shared Area of Learning: Yes   Goal Set: PHQ9 was an 8 today  Tx Plan Objective: 1 0, Therapist:  Claribel WU

## 2018-06-05 NOTE — PSYCH
Subjective:     Patient ID: Adryan Pollard is a 50 y o  female  [facility] Clinical Progress Notes      Specialized Services Documentation  Therapist must complete separate progress note for each specific clinical activity in which the individual participated during the day  Group Psychotherapy  [id number]  Jacki Ibanez participated in wellness group focused on using healthy assertiveness skills to take care of oneself, including the use of deciding on and enacting healthy boundaries for everyday wellness, in work and in personal life  Jacki Ibanez shared actively in exercise of identifying who she has difficulty setting boundaries with and she discussed how it is very difficult for her to set healthy boundaries with her loved ones in particular and to a lesser degree "people I feel sorry for " Jacki Ibanez gave an example of how she gave money to someone going door to door selling candy bars he stated for his school and she gave him money but he never returned with the candy bars  She described how she is "too trusting" and that this has made her question strangers who she gives to as well as feeling "manipulated" at times by not setting healthy boundaries with loved ones  Jacki Ibanez made some mild progress toward goals  D/C at end of Program day      Tx Plan Objective: 1 1, 12,1 4  , Therapist:  Beau Prince RN

## 2018-06-14 ENCOUNTER — TELEPHONE (OUTPATIENT)
Dept: PSYCHOLOGY | Facility: CLINIC | Age: 48
End: 2018-06-14

## 2018-06-14 NOTE — TELEPHONE ENCOUNTER
Christopher Lazaro called to advise that she ran out of her clonazepam yesterday, and now her PCP will not refill it for her as it is a controlled substance  She has made an appointment with [facility] since they will allow her to see their psychiatrist and keep her current therapist whom she has been seeing for a while  She asked if [facility]'s doctor would be willing to refill it for her  This CM advised that Dr Odilon Junior is back from vacation and may not be willing to refill it, since she never met Christopher Lazaro  However, since Dr Josefina Santiago, who did treat her, is out of the office until Monday, it will be requested of Dr Odilon Junior  If there is a problem, this CM will call Christopher Lazaro back

## 2018-08-21 ENCOUNTER — APPOINTMENT (OUTPATIENT)
Dept: LAB | Facility: CLINIC | Age: 48
End: 2018-08-21
Payer: COMMERCIAL

## 2018-08-21 ENCOUNTER — TRANSCRIBE ORDERS (OUTPATIENT)
Dept: LAB | Facility: CLINIC | Age: 48
End: 2018-08-21

## 2018-08-21 DIAGNOSIS — E55.9 AVITAMINOSIS D: ICD-10-CM

## 2018-08-21 DIAGNOSIS — E55.9 AVITAMINOSIS D: Primary | ICD-10-CM

## 2018-08-21 LAB — 25(OH)D3 SERPL-MCNC: 37.9 NG/ML (ref 30–100)

## 2018-08-21 PROCEDURE — 36415 COLL VENOUS BLD VENIPUNCTURE: CPT

## 2018-08-21 PROCEDURE — 82306 VITAMIN D 25 HYDROXY: CPT

## 2018-10-28 ENCOUNTER — ANESTHESIA EVENT (OUTPATIENT)
Dept: BEHAVIORAL HEALTH UNIT | Facility: HOSPITAL | Age: 48
End: 2018-10-28

## 2018-10-28 ENCOUNTER — APPOINTMENT (INPATIENT)
Dept: RADIOLOGY | Facility: HOSPITAL | Age: 48
DRG: 753 | End: 2018-10-28
Payer: COMMERCIAL

## 2018-10-28 ENCOUNTER — HOSPITAL ENCOUNTER (INPATIENT)
Facility: HOSPITAL | Age: 48
LOS: 3 days | Discharge: HOME | DRG: 753 | End: 2018-10-31
Attending: EMERGENCY MEDICINE | Admitting: PSYCHIATRY & NEUROLOGY
Payer: COMMERCIAL

## 2018-10-28 ENCOUNTER — ANESTHESIA (OUTPATIENT)
Dept: BEHAVIORAL HEALTH UNIT | Facility: HOSPITAL | Age: 48
End: 2018-10-28

## 2018-10-28 DIAGNOSIS — K58.0 IRRITABLE BOWEL SYNDROME WITH DIARRHEA: ICD-10-CM

## 2018-10-28 DIAGNOSIS — F33.2 SEVERE EPISODE OF RECURRENT MAJOR DEPRESSIVE DISORDER, WITHOUT PSYCHOTIC FEATURES (HCC): Primary | ICD-10-CM

## 2018-10-28 PROBLEM — Z72.0 TOBACCO ABUSE: Status: ACTIVE | Noted: 2018-10-28

## 2018-10-28 PROBLEM — R00.0 SINUS TACHYCARDIA: Status: ACTIVE | Noted: 2018-10-28

## 2018-10-28 PROBLEM — Z87.81 HISTORY OF RIB FRACTURE: Status: ACTIVE | Noted: 2018-10-28

## 2018-10-28 LAB
AMPHETAMINES SERPL QL SCN: NEGATIVE
ANION GAP SERPL CALCULATED.3IONS-SCNC: 6 MMOL/L (ref 5–14)
ANISOCYTOSIS BLD QL SMEAR: PRESENT
BARBITURATES UR QL: POSITIVE
BASOPHILS # BLD AUTO: 0 THOUSANDS/ΜL (ref 0–0.1)
BASOPHILS NFR BLD AUTO: 1 % (ref 0–1)
BENZODIAZ UR QL: NEGATIVE
BUN SERPL-MCNC: 17 MG/DL (ref 5–25)
CALCIUM SERPL-MCNC: 9.4 MG/DL (ref 8.4–10.2)
CHLORIDE SERPL-SCNC: 103 MMOL/L (ref 97–108)
CO2 SERPL-SCNC: 28 MMOL/L (ref 22–30)
COCAINE UR QL: NEGATIVE
CREAT SERPL-MCNC: 0.71 MG/DL (ref 0.6–1.2)
EOSINOPHIL # BLD AUTO: 0.1 THOUSAND/ΜL (ref 0–0.4)
EOSINOPHIL NFR BLD AUTO: 1 % (ref 0–6)
ERYTHROCYTE [DISTWIDTH] IN BLOOD BY AUTOMATED COUNT: 15.6 %
ETHANOL SERPL-MCNC: <10 MG/DL (ref 0–10)
GFR SERPL CREATININE-BSD FRML MDRD: 100 ML/MIN/1.73SQ M
GLUCOSE SERPL-MCNC: 122 MG/DL (ref 70–99)
HCT VFR BLD AUTO: 38.2 % (ref 36–46)
HGB BLD-MCNC: 12.9 G/DL (ref 12–16)
LYMPHOCYTES # BLD AUTO: 1.5 THOUSANDS/ΜL (ref 0.5–4)
LYMPHOCYTES NFR BLD AUTO: 24 % (ref 20–50)
MACROCYTES BLD QL AUTO: PRESENT
MCH RBC QN AUTO: 38.1 PG (ref 26–34)
MCHC RBC AUTO-ENTMCNC: 33.7 G/DL (ref 31–36)
MCV RBC AUTO: 113 FL (ref 80–100)
METHADONE UR QL: NEGATIVE
MONOCYTES # BLD AUTO: 0.3 THOUSAND/ΜL (ref 0.2–0.9)
MONOCYTES NFR BLD AUTO: 5 % (ref 1–10)
NEUTROPHILS # BLD AUTO: 4.5 THOUSANDS/ΜL (ref 1.8–7.8)
NEUTS SEG NFR BLD AUTO: 70 % (ref 45–65)
OPIATES UR QL SCN: NEGATIVE
PCP UR QL: NEGATIVE
PLATELET # BLD AUTO: 229 THOUSANDS/UL (ref 150–450)
PLATELET BLD QL SMEAR: ADEQUATE
PMV BLD AUTO: 7.5 FL (ref 8.9–12.7)
POTASSIUM SERPL-SCNC: 4.6 MMOL/L (ref 3.6–5)
RBC # BLD AUTO: 3.38 MILLION/UL (ref 4–5.2)
RBC MORPH BLD: NORMAL
SODIUM SERPL-SCNC: 137 MMOL/L (ref 137–147)
THC UR QL: POSITIVE
TSH SERPL DL<=0.05 MIU/L-ACNC: 1.39 UIU/ML (ref 0.47–4.68)
WBC # BLD AUTO: 6.4 THOUSAND/UL (ref 4.5–11)

## 2018-10-28 PROCEDURE — 93005 ELECTROCARDIOGRAM TRACING: CPT

## 2018-10-28 PROCEDURE — 71101 X-RAY EXAM UNILAT RIBS/CHEST: CPT

## 2018-10-28 PROCEDURE — 85025 COMPLETE CBC W/AUTO DIFF WBC: CPT | Performed by: EMERGENCY MEDICINE

## 2018-10-28 PROCEDURE — 80320 DRUG SCREEN QUANTALCOHOLS: CPT | Performed by: EMERGENCY MEDICINE

## 2018-10-28 PROCEDURE — 80307 DRUG TEST PRSMV CHEM ANLYZR: CPT | Performed by: EMERGENCY MEDICINE

## 2018-10-28 PROCEDURE — 99253 IP/OBS CNSLTJ NEW/EST LOW 45: CPT | Performed by: FAMILY MEDICINE

## 2018-10-28 PROCEDURE — 99285 EMERGENCY DEPT VISIT HI MDM: CPT

## 2018-10-28 PROCEDURE — 80048 BASIC METABOLIC PNL TOTAL CA: CPT | Performed by: EMERGENCY MEDICINE

## 2018-10-28 PROCEDURE — 84443 ASSAY THYROID STIM HORMONE: CPT | Performed by: FAMILY MEDICINE

## 2018-10-28 PROCEDURE — 36415 COLL VENOUS BLD VENIPUNCTURE: CPT | Performed by: EMERGENCY MEDICINE

## 2018-10-28 RX ORDER — TRAMADOL HYDROCHLORIDE 50 MG/1
50 TABLET ORAL EVERY 6 HOURS PRN
Status: DISCONTINUED | OUTPATIENT
Start: 2018-10-28 | End: 2018-10-30

## 2018-10-28 RX ORDER — ALBUTEROL SULFATE 90 UG/1
2 AEROSOL, METERED RESPIRATORY (INHALATION) EVERY 4 HOURS PRN
Status: DISCONTINUED | OUTPATIENT
Start: 2018-10-28 | End: 2018-10-31 | Stop reason: HOSPADM

## 2018-10-28 RX ORDER — BUDESONIDE AND FORMOTEROL FUMARATE DIHYDRATE 160; 4.5 UG/1; UG/1
2 AEROSOL RESPIRATORY (INHALATION) 2 TIMES DAILY
Status: DISCONTINUED | OUTPATIENT
Start: 2018-10-28 | End: 2018-10-31 | Stop reason: HOSPADM

## 2018-10-28 RX ORDER — LORAZEPAM 0.5 MG/1
1 TABLET ORAL ONCE
Status: COMPLETED | OUTPATIENT
Start: 2018-10-28 | End: 2018-10-28

## 2018-10-28 RX ORDER — PROPRANOLOL HYDROCHLORIDE 10 MG/1
10 TABLET ORAL 2 TIMES DAILY
Status: DISCONTINUED | OUTPATIENT
Start: 2018-10-28 | End: 2018-10-31 | Stop reason: HOSPADM

## 2018-10-28 RX ORDER — MIRTAZAPINE 7.5 MG/1
7.5 TABLET, FILM COATED ORAL
Status: DISCONTINUED | OUTPATIENT
Start: 2018-10-28 | End: 2018-10-29

## 2018-10-28 RX ORDER — NICOTINE 21 MG/24HR
1 PATCH, TRANSDERMAL 24 HOURS TRANSDERMAL DAILY
Status: DISCONTINUED | OUTPATIENT
Start: 2018-10-28 | End: 2018-10-31 | Stop reason: HOSPADM

## 2018-10-28 RX ORDER — FLUTICASONE PROPIONATE 50 MCG
2 SPRAY, SUSPENSION (ML) NASAL DAILY
Status: DISCONTINUED | OUTPATIENT
Start: 2018-10-28 | End: 2018-10-28

## 2018-10-28 RX ORDER — ACETAMINOPHEN 325 MG/1
650 TABLET ORAL EVERY 4 HOURS PRN
Status: DISCONTINUED | OUTPATIENT
Start: 2018-10-28 | End: 2018-10-31 | Stop reason: HOSPADM

## 2018-10-28 RX ORDER — VENLAFAXINE HYDROCHLORIDE 75 MG/1
75 CAPSULE, EXTENDED RELEASE ORAL DAILY
Status: DISCONTINUED | OUTPATIENT
Start: 2018-10-28 | End: 2018-10-29

## 2018-10-28 RX ORDER — OLANZAPINE 10 MG/1
5 INJECTION, POWDER, LYOPHILIZED, FOR SOLUTION INTRAMUSCULAR EVERY 6 HOURS PRN
Status: DISCONTINUED | OUTPATIENT
Start: 2018-10-28 | End: 2018-10-31 | Stop reason: HOSPADM

## 2018-10-28 RX ORDER — LORAZEPAM 0.5 MG/1
TABLET ORAL
Status: COMPLETED
Start: 2018-10-28 | End: 2018-10-28

## 2018-10-28 RX ORDER — OLANZAPINE 5 MG/1
5 TABLET ORAL EVERY 6 HOURS PRN
Status: DISCONTINUED | OUTPATIENT
Start: 2018-10-28 | End: 2018-10-31 | Stop reason: HOSPADM

## 2018-10-28 RX ORDER — PANTOPRAZOLE SODIUM 20 MG/1
20 TABLET, DELAYED RELEASE ORAL
Status: DISCONTINUED | OUTPATIENT
Start: 2018-10-29 | End: 2018-10-31 | Stop reason: HOSPADM

## 2018-10-28 RX ORDER — CLONAZEPAM 1 MG/1
1 TABLET ORAL 2 TIMES DAILY
Status: DISCONTINUED | OUTPATIENT
Start: 2018-10-28 | End: 2018-10-30

## 2018-10-28 RX ORDER — LISINOPRIL 20 MG/1
20 TABLET ORAL DAILY
Status: DISCONTINUED | OUTPATIENT
Start: 2018-10-28 | End: 2018-10-31 | Stop reason: HOSPADM

## 2018-10-28 RX ORDER — GABAPENTIN 400 MG/1
400 CAPSULE ORAL 3 TIMES DAILY
Status: DISCONTINUED | OUTPATIENT
Start: 2018-10-28 | End: 2018-10-31 | Stop reason: HOSPADM

## 2018-10-28 RX ORDER — TRAZODONE HYDROCHLORIDE 50 MG/1
50 TABLET ORAL
Status: DISCONTINUED | OUTPATIENT
Start: 2018-10-28 | End: 2018-10-30

## 2018-10-28 RX ORDER — LIDOCAINE 50 MG/G
1 PATCH TOPICAL DAILY
Status: DISCONTINUED | OUTPATIENT
Start: 2018-10-28 | End: 2018-10-31 | Stop reason: HOSPADM

## 2018-10-28 RX ORDER — LORAZEPAM 0.5 MG/1
0.5 TABLET ORAL EVERY 6 HOURS PRN
Status: DISCONTINUED | OUTPATIENT
Start: 2018-10-28 | End: 2018-10-31 | Stop reason: HOSPADM

## 2018-10-28 RX ORDER — LORAZEPAM 2 MG/ML
0.5 INJECTION INTRAMUSCULAR EVERY 6 HOURS PRN
Status: DISCONTINUED | OUTPATIENT
Start: 2018-10-28 | End: 2018-10-31 | Stop reason: HOSPADM

## 2018-10-28 RX ADMIN — BUDESONIDE AND FORMOTEROL FUMARATE DIHYDRATE 2 PUFF: 160; 4.5 AEROSOL RESPIRATORY (INHALATION) at 17:17

## 2018-10-28 RX ADMIN — MIRTAZAPINE 7.5 MG: 7.5 TABLET, FILM COATED ORAL at 21:09

## 2018-10-28 RX ADMIN — FLUTICASONE PROPIONATE 2 SPRAY: 50 SPRAY, METERED NASAL at 16:11

## 2018-10-28 RX ADMIN — CLONAZEPAM 1 MG: 1 TABLET ORAL at 16:10

## 2018-10-28 RX ADMIN — LORAZEPAM 1 MG: 0.5 TABLET ORAL at 09:37

## 2018-10-28 RX ADMIN — NICOTINE 1 PATCH: 14 PATCH, EXTENDED RELEASE TRANSDERMAL at 16:13

## 2018-10-28 RX ADMIN — CLONAZEPAM 1 MG: 1 TABLET ORAL at 17:16

## 2018-10-28 RX ADMIN — PROPRANOLOL HYDROCHLORIDE 10 MG: 10 TABLET ORAL at 17:15

## 2018-10-28 RX ADMIN — TRAMADOL HYDROCHLORIDE 50 MG: 50 TABLET, COATED ORAL at 16:21

## 2018-10-28 RX ADMIN — GABAPENTIN 400 MG: 400 CAPSULE ORAL at 16:09

## 2018-10-28 RX ADMIN — GABAPENTIN 400 MG: 400 CAPSULE ORAL at 21:09

## 2018-10-28 RX ADMIN — LISINOPRIL 20 MG: 20 TABLET ORAL at 16:09

## 2018-10-28 RX ADMIN — VENLAFAXINE HYDROCHLORIDE 75 MG: 75 CAPSULE, EXTENDED RELEASE ORAL at 16:09

## 2018-10-28 RX ADMIN — TRAZODONE HYDROCHLORIDE 50 MG: 50 TABLET ORAL at 21:09

## 2018-10-28 NOTE — ED NOTES
The patient was referred by Nely Stock DNP, for treatment refractory depression  ECT has reportedly been discussed between [address] [place] [address]  The patient reports she has been increasingly depressed and unable to function  She reports she has been staying in bed and would not eat except that her  is bringing her food and beverages  She reports she is not tending to any of her usual activities, such as cleaning, cooking, grocery shopping  The patient is very tearful  [facility] with [facility] 11  She stated she feels quite guilty because she believes she has caused her daughter's genetic propensity for daughter's diagnosis of depression  The patient reports she is fearful of ECT but that the idea gives her some hope  She stated she had been trying to accept that her current state is all she can expect; The patient reports a lithium carbonate overdose in May 2018  She stated she has passive suicidal thoughts but does not currently have the energy to attempt suicide

## 2018-10-28 NOTE — ED NOTES
Patient is accepted at [facility]  Patient is accepted by Susana Brown PA-C for Eryn Washington MD       Patient may go to the floor at 10:15  Nurse report is to be called to  prior to patient transfer

## 2018-10-28 NOTE — ED PROVIDER NOTES
History  Chief Complaint   Patient presents with    Psychiatric Evaluation     was scheduled to come in yesterday to be admitted  for ECT - is currently not feeling well - has a sore throat and hoarseness -  denies SI/HI      Patient is a 52-year-old female with a history of depression who was referred into hospital admission for ECT therapy for a one-week history of worsening depression  Patient has a history of MDD currently on medications and compliant states not helping  Has a history of suicide attempts but denies any suicidal ideations at this time also denies any homicidal ideations nor auditory/visual hallucinations  Cannot cite any specific trigger  Has been admitted in the past             Prior to Admission Medications   Prescriptions Last Dose Informant Patient Reported?  Taking?   budesonide-formoterol (SYMBICORT) 160-4 5 mcg/act inhaler   Yes Yes   Sig: Inhale 2 puffs 2 (two) times a day   clonazePAM (KlonoPIN) 1 mg tablet   No Yes   Sig: Take 1 tablet (1 mg total) by mouth 2 (two) times a day At 9AM and 6PM   fluticasone (FLONASE) 50 mcg/act nasal spray   Yes Yes   Si sprays into each nostril daily Not taking recently    gabapentin (NEURONTIN) 400 mg capsule   No Yes   Sig: Take 1 capsule (400 mg total) by mouth 3 (three) times a day At 9AM, 4PM, and 9PM   lisinopril (ZESTRIL) 20 mg tablet   No Yes   Sig: Take 1 tablet (20 mg total) by mouth daily At 9AM   mirtazapine (REMERON) 15 mg tablet   No Yes   Sig: Take 1/2 to one tablet at bedtime   omeprazole (PriLOSEC) 40 MG capsule   Yes Yes   Sig: Take 1 capsule by mouth Daily   propranolol (INDERAL) 10 mg tablet   No Yes   Sig: Take 1 tablet (10 mg total) by mouth 2 (two) times a day At 9AM and 6PM   traMADol (ULTRAM) 50 mg tablet   No Yes   Sig: Take 2 tablets (100 mg total) by mouth every 8 (eight) hours as needed for moderate pain   venlafaxine (EFFEXOR-XR) 75 mg 24 hr capsule   No Yes   Sig: Take 1 capsule (75 mg total) by mouth daily At 9AM      Facility-Administered Medications: None       Past Medical History:   Diagnosis Date    Anxiety     Bipolar disorder ([address] )     Chronic pain disorder     COPD (chronic obstructive pulmonary disease) (HCC)     Depression     Hyperlipidemia     Hypertension     Psychiatric disorder     Self-injurious behavior     Sinus tachycardia     Sleep difficulties     Suicide attempt [facility])        Past Surgical History:   Procedure Laterality Date     SECTION      PANCREAS SURGERY      "pseudocysts" per patient's  Ricki Olson    [place] ESOPHAGOGASTRODUODENOSCOPY TRANSORAL DIAGNOSTIC N/A 4/10/2018    Procedure: EGD AND COLONOSCOPY;  Surgeon: Madeline Mack MD;  [place] [facility]; Service: Gastroenterology       Family History   Problem Relation Age of Onset    Arthritis Mother     Depression Neg Hx      I have reviewed and agree with the history as documented  Social History   Substance Use Topics    Smoking status: Current Every Day Smoker     Packs/day: 1 50     Types: Cigarettes    Smokeless tobacco: Current User    Alcohol use Yes      Comment: vodka and beer, has decreased to occasionally        Review of Systems   Constitutional: Negative  HENT: Positive for sore throat  Eyes: Negative  Respiratory: Negative  Negative for shortness of breath  Cardiovascular: Negative  Negative for chest pain  Gastrointestinal: Negative  Negative for abdominal pain, nausea and vomiting  Endocrine: Negative  Genitourinary: Negative  Musculoskeletal: Negative  Skin: Negative  Allergic/Immunologic: Negative  Neurological: Negative  Hematological: Negative  Psychiatric/Behavioral: Positive for decreased concentration and dysphoric mood  Negative for suicidal ideas  All other systems reviewed and are negative  Physical Exam  Physical Exam   Constitutional: She is oriented to person, place, and time  She appears well-developed and well-nourished     HENT: Head: Normocephalic  Right Ear: External ear normal    Left Ear: External ear normal    Nose: Nose normal    Mouth/Throat: Oropharynx is clear and moist    Eyes: Pupils are equal, round, and reactive to light  Conjunctivae are normal    Neck: Normal range of motion  Neck supple  Cardiovascular: Normal rate, regular rhythm, normal heart sounds and intact distal pulses  Pulmonary/Chest: Effort normal and breath sounds normal    Abdominal: Soft  Bowel sounds are normal    Musculoskeletal: Normal range of motion  Neurological: She is oriented to person, place, and time  Skin: Skin is warm and dry  Capillary refill takes less than 2 seconds  Psychiatric: Her speech is normal and behavior is normal  Judgment and thought content normal  She is not actively hallucinating  Cognition and memory are normal  She exhibits a depressed mood  She is attentive  Nursing note and vitals reviewed        Vital Signs  ED Triage Vitals   Temperature Pulse Respirations Blood Pressure SpO2   10/28/18 0744 10/28/18 0744 10/28/18 0744 10/28/18 0744 10/28/18 0744   98 1 °F (36 7 °C) 101 16 138/86 95 %      Temp Source Heart Rate Source Patient Position - Orthostatic VS BP Location FiO2 (%)   10/28/18 0744 10/28/18 0744 10/28/18 0744 10/28/18 0744 --   Tympanic Monitor Sitting Left arm       Pain Score       10/28/18 1139       5           Vitals:    10/28/18 1139 10/28/18 1500 10/28/18 1715 10/29/18 0723   BP: 113/78 133/91 130/92 (!) 167/109   Pulse: 98 98 98 (!) 108   Patient Position - Orthostatic VS: Sitting   Sitting       Visual Acuity      ED Medications  Medications   nicotine (NICODERM CQ) 14 mg/24hr TD 24 hr patch 1 patch (1 patch Transdermal Patch Removed 10/29/18 0859)   traZODone (DESYREL) tablet 50 mg (50 mg Oral Given 10/28/18 2109)   acetaminophen (TYLENOL) tablet 650 mg (not administered)   OLANZapine (ZyPREXA) tablet 5 mg (not administered)   OLANZapine (ZyPREXA) IM injection 5 mg (not administered) LORazepam (ATIVAN) tablet 0 5 mg (0 5 mg Oral Given 10/29/18 0551)   LORazepam (ATIVAN) 2 mg/mL injection 0 5 mg (not administered)   venlafaxine (EFFEXOR-XR) 24 hr capsule 75 mg (75 mg Oral Given 10/28/18 1609)   mirtazapine (REMERON) tablet 7 5 mg (7 5 mg Oral Given 10/28/18 2109)   clonazePAM (KlonoPIN) tablet 1 mg (1 mg Oral Given 10/28/18 1716)   budesonide-formoterol (SYMBICORT) 160-4 5 mcg/act inhaler 2 puff (2 puffs Inhalation Given 10/28/18 1717)   gabapentin (NEURONTIN) capsule 400 mg (400 mg Oral Given 10/28/18 2109)   lisinopril (ZESTRIL) tablet 20 mg (20 mg Oral Given 10/28/18 1609)   pantoprazole (PROTONIX) EC tablet 20 mg (20 mg Oral Given 10/29/18 0629)   propranolol (INDERAL) tablet 10 mg (10 mg Oral Given 10/28/18 1715)   albuterol (PROVENTIL HFA,VENTOLIN HFA) inhaler 2 puff (not administered)   traMADol (ULTRAM) tablet 50 mg (50 mg Oral Given 10/28/18 1621)   lidocaine (LIDODERM) 5 % patch 1 patch (0 patches Topical Hold 10/28/18 1613)   LORazepam (ATIVAN) tablet 1 mg (1 mg Oral Given 10/28/18 0937)       Diagnostic Studies  Results Reviewed     Procedure Component Value Units Date/Time    TSH, 3rd generation [[id number]]  (Normal) Collected:  10/28/18 0800    Lab Status:  Final result Specimen:  Blood from Arm, Right Updated:  10/28/18 1502     TSH 3RD GENERATON 1 390 uIU/mL     Narrative: The recommended reference ranges for TSH during pregnancy are as follows:  First trimester 0 1 to 2 5 uIU/mL  Second trimester  0 2 to 3 0 uIU/mL  Third trimester 0 3 to 3 0 uIU/m      Patients undergoing fluorescein dye angiography may retain small amounts of fluorescein in the body for 48-72 hours post procedure  Samples containing fluorescein can produce falsely depressed TSH values  If the patient had this procedure,a specimen should be resubmitted post fluorescein clearance      Rapid drug screen, urine [[id number]]  (Abnormal) Collected:  10/28/18 5131    Lab Status:  Final result Specimen: Urine from Urine, Catheter Updated:  10/28/18 5187     Amph/Meth UR Negative     Barbiturate Ur Positive (A)     Benzodiazepine Urine Negative     Cocaine Urine Negative     Methadone Urine Negative     Opiate Urine Negative     PCP Ur Negative     THC Urine Positive (A)    Narrative:         Presumptive report  If requested, specimen will be sent to reference lab for confirmation  FOR MEDICAL PURPOSES ONLY  IF CONFIRMATION NEEDED PLEASE CONTACT THE LAB WITHIN 5 DAYS  Drug Screen Cutoff Levels:  AMPHETAMINE/METHAMPHETAMINES  1000 ng/mL  BARBITURATES     200 ng/mL  BENZODIAZEPINES     200 ng/mL  COCAINE      300 ng/mL  METHADONE      300 ng/mL  OPIATES      300 ng/mL  PHENCYCLIDINE     25 ng/mL  THC       50 ng/mL    Basic metabolic panel [[id number]]  (Abnormal) Collected:  10/28/18 0800    Lab Status:  Final result Specimen:  Blood from Arm, Right Updated:  10/28/18 0817     Sodium 137 mmol/L      Potassium 4 6 mmol/L      Chloride 103 mmol/L      CO2 28 mmol/L      ANION GAP 6 mmol/L      BUN 17 mg/dL      Creatinine 0 71 mg/dL      Glucose 122 (H) mg/dL      Calcium 9 4 mg/dL      eGFR 100 ml/min/1 73sq m     Narrative:         National Kidney Disease Education Program recommendations are as follows:  GFR calculation is accurate only with a steady state creatinine  Chronic Kidney disease less than 60 ml/min/1 73 sq  meters  Kidney failure less than 15 ml/min/1 73 sq  meters      Ethanol [[id number]]  (Normal) Collected:  10/28/18 0800    Lab Status:  Final result Specimen:  Blood from Arm, Right Updated:  10/28/18 0817     Ethanol Lvl <10 mg/dL     CBC and differential [[id number]]  (Abnormal) Collected:  10/28/18 0800    Lab Status:  Final result Specimen:  Blood from Arm, Right Updated:  10/28/18 0810     WBC 6 40 Thousand/uL      RBC 3 38 (L) Million/uL      Hemoglobin 12 9 g/dL      Hematocrit 38 2 %       (H) fL      MCH 38 1 (H) pg      MCHC 33 7 g/dL      RDW 15 6 (H) %      MPV 7 5 (L) fL Platelets 768 Thousands/uL      Neutrophils Relative 70 (H) %      Lymphocytes Relative 24 %      Monocytes Relative 5 %      Eosinophils Relative 1 %      Basophils Relative 1 %      Neutrophils Absolute 4 50 Thousands/µL      Lymphocytes Absolute 1 50 Thousands/µL      Monocytes Absolute 0 30 Thousand/µL      Eosinophils Absolute 0 10 Thousand/µL      Basophils Absolute 0 00 Thousands/µL                  XR ribs right w pa chest min 3 views    (Results Pending)              Procedures  Procedures       Phone Contacts  ED Phone Contact    ED Course  ED Course as of Oct 29 0735   Sun Oct 28, 2018   9785 Patient signed a 12  Will admit to Dr Cyndee Morejon  [facility]  [facility] Time    Disposition  Final diagnoses:   Severe episode of recurrent major depressive disorder, without psychotic features ([facility] [address] )     Time reflects when diagnosis was documented in both MDM as applicable and the Disposition within this note     Time User Action Codes Description Comment    10/28/2018  9:03 AM Alexsander Bass Add [K58 0] Irritable bowel syndrome with diarrhea     10/28/2018  9:03 AM Alexsander Bass Modify [K58 0] Irritable bowel syndrome with diarrhea     10/28/2018  9:03 AM Alexsander Bass Modify [K58 0] Irritable bowel syndrome with diarrhea     10/28/2018  9:27 AM Vu Welch Add [F33 2] Severe episode of recurrent major depressive disorder, without psychotic features [facility])       ED Disposition     ED Disposition Condition Comment    Transfer to [address] [place] [address] should be transferred out to [facility] and has been medically cleared         MD Documentation      Most Recent Value   Accepting Physician  Gabriel Waters PA-C for Juancarlos Jimenez MD   Accepting Facility Name, [facility] MD      RN Documentation      Most Recent Value   Accepting Facility Name, [address]   [facility]      Follow-up Information None         Current Discharge Medication List      CONTINUE these medications which have NOT CHANGED    Details   budesonide-formoterol (SYMBICORT) 160-4 5 mcg/act inhaler Inhale 2 puffs 2 (two) times a day      clonazePAM (KlonoPIN) 1 mg tablet Take 1 tablet (1 mg total) by mouth 2 (two) times a day At 9AM and 6PM  Qty: 14 tablet, Refills: 0    Associated Diagnoses: Generalized anxiety disorder      fluticasone (FLONASE) 50 mcg/act nasal spray 2 sprays into each nostril daily Not taking recently       gabapentin (NEURONTIN) 400 mg capsule Take 1 capsule (400 mg total) by mouth 3 (three) times a day At 9AM, 4PM, and 9PM  Qty: 21 capsule, Refills: 0    Associated Diagnoses: Neurogenic pain; Generalized anxiety disorder      lisinopril (ZESTRIL) 20 mg tablet Take 1 tablet (20 mg total) by mouth daily At 9AM  Qty: 14 tablet, Refills: 1    Associated Diagnoses: Essential hypertension      mirtazapine (REMERON) 15 mg tablet Take 1/2 to one tablet at bedtime  Qty: 30 tablet, Refills: 0    Associated Diagnoses: Severe episode of recurrent major depressive disorder, without psychotic features ([facility])      omeprazole (PriLOSEC) 40 MG capsule Take 1 capsule by mouth Daily      propranolol (INDERAL) 10 mg tablet Take 1 tablet (10 mg total) by mouth 2 (two) times a day At 9AM and 6PM  Qty: 30 tablet, Refills: 1    Associated Diagnoses: Essential hypertension      traMADol (ULTRAM) 50 mg tablet Take 2 tablets (100 mg total) by mouth every 8 (eight) hours as needed for moderate pain  Qty: 30 tablet, Refills: 1    Associated Diagnoses: Neurogenic pain      venlafaxine (EFFEXOR-XR) 75 mg 24 hr capsule Take 1 capsule (75 mg total) by mouth daily At 9AM  Qty: 7 capsule, Refills: 0    Associated Diagnoses: Severe episode of recurrent major depressive disorder, without psychotic features ([facility] )           No discharge procedures on file      ED Provider  Electronically Signed by           Ekaterina Cui MD  10/29/18 7724

## 2018-10-28 NOTE — ASSESSMENT & PLAN NOTE
Patient metabolic panel reviewed  tsh ordered for tomorrow  will order ekg and also cxray right rib series to follow up on rib fractures  ect clearance pending for now

## 2018-10-28 NOTE — ED NOTES
Insurance Authorization:   Phone call placed to [facility]  Phone number: [phone]  Spoke to Cathy SAMPSON      3 days approved, LCD 10/30  Level of care: IP   Review on 10/30   Authorization # [id number]

## 2018-10-28 NOTE — ASSESSMENT & PLAN NOTE
Patient has history of right sided rib fractures mildly displaced  Including the right 5th to 9th rib  Will do an x-ray for follow-up to make sure that the fractures were healing and nondisplaced  Will continue tramadol for pain and also lidocaine patch topical for pain  She is awaiting to be seen by Dr Natalia Douglass from pain management  Also on Neurontin for right-sided rib pain   She is also on medical marijuana

## 2018-10-28 NOTE — PLAN OF CARE
Depression     Treatment Goal: Demonstrate behavioral control of depressive symptoms, verbalize feelings of improved mood/affect, and adopt new coping skills prior to discharge Not Progressing     Verbalize thoughts and feelings Not Progressing     Refrain from harming self Not Progressing     Refrain from isolation Not Progressing     Refrain from self-neglect Not Progressing     Attend and participate in unit activities, including therapeutic, recreational, and educational groups Not Progressing     Complete daily ADLs, including personal hygiene independently, as able Not Progressing        Electroconvulsive therapy (ECT)     Treatment Goal: Demonstrate a reduction of confusion and improved orientation to person, place, time and/or situation upon discharge, according to optimum baseline/ability Not Progressing     Verbalizes/displays adequate comfort level or baseline comfort level Not Progressing     Achieves stable or improved neurological status Not Progressing     Achieves maximal functionality and self care Not Progressing     Maintain or return mobility to safest level of function Not Progressing     Absence of urinary retention Not Progressing     Minimal or absence of nausea and/or vomiting Not Progressing     Maintains adequate nutritional intake Not Progressing

## 2018-10-28 NOTE — CONSULTS
Consult- Sabrina Vargas 1970, 46 y o  female MRN: [id number]    Unit/Bed#: [facility]01 Encounter: [id number]    Primary Care Provider: Bridget Cortez DO   Date and time admitted to hospital: 10/28/2018  7:34 AM      Inpatient consult to Internal Medicine  Consult performed by: Lucio Chaves ordered by: Nicole Larsen          * Severe episode of recurrent major depressive disorder, without psychotic features [facility])   Assessment & Plan    Patient metabolic panel reviewed  tsh ordered for tomorrow  will order ekg and also cxray right rib series to follow up on rib fractures  ect clearance pending for now     Tobacco abuse   Assessment & Plan    Counseled on smoking cessation  Placed on nicotine replacement therapy     History of rib fracture   Assessment & Plan    Patient has history of right sided rib fractures mildly displaced  Including the right 5th to 9th rib  Will do an x-ray for follow-up to make sure that the fractures were healing and nondisplaced  Will continue tramadol for pain and also lidocaine patch topical for pain  She is awaiting to be seen by Dr Kimberly Mcdonald from pain management  Also on Neurontin for right-sided rib pain  She is also on medical marijuana     Sinus tachycardia   Assessment & Plan    Patient has a history of sinus tachycardia  She is on Inderal as per her family doctor  COPD without exacerbation [facility])   Assessment & Plan    Not in exacerbation   Continue Symbicort and albuterol for now  Hypertension   Assessment & Plan    Controlled The current medical regimen is effective;  continue present plan and medications  Home medications of lisinopril and inderal         VTE Prophylaxis: RX contraindicated due to: low risk  / reason for no mechanical VTE prophylaxis depressed     Recommendations for Discharge:  · Follow up outpatient with pain management    Counseling / Coordination of Care Time: 45 minutes    Greater than 50% of total time spent on patient counseling and coordination of care  Collaboration of Care: Were Recommendations Directly Discussed with Primary Treatment Team? - Yes     History of Present Illness:    Seymour Swift is a 46 y o  female who is originally admitted to the psychiatry service due to major depression  We are consulted for medical management  Patient complains of pain in the right side of her chest   She was told last month that she has rib 5-9 slightly displaced fractures  She is open to get into pain management soon  She has 5/10 pain in her right chest wall at this time  She also states that taking deep breath makes the pain worse  She denies any fevers or chills  Does have a sore throat right now  Review of Systems:    Review of Systems   Constitutional: Negative for appetite change, chills, fatigue and fever  HENT: Negative for hearing loss, sore throat and trouble swallowing  Eyes: Negative for photophobia, discharge and visual disturbance  Respiratory: Positive for chest tightness  Negative for shortness of breath  Cardiovascular: Positive for chest pain  Negative for palpitations  Gastrointestinal: Negative for abdominal pain, blood in stool and vomiting  Endocrine: Negative for polydipsia and polyuria  Genitourinary: Negative for difficulty urinating, dysuria, flank pain and hematuria  Musculoskeletal: Negative for back pain and gait problem  Skin: Negative for rash  Allergic/Immunologic: Negative for environmental allergies and food allergies  Neurological: Negative for dizziness, seizures, syncope and headaches  Hematological: Does not bruise/bleed easily  Psychiatric/Behavioral: Positive for behavioral problems  The patient is nervous/anxious  All other systems reviewed and are negative           Past Medical and Surgical History:     Past Medical History:   Diagnosis Date    Anxiety     B[facility] disorder ([facility] )     Chronic pain disorder     COPD [facility]chronic obstructive [facility] disease) [facility]     Depression     Hyperlipidemia     Hypertension     Psychiatric disorder     Self-injurious behavior     Sinus tachycardia     Sleep difficulties     Suicide attempt [facility])        Past Surgical History:   Procedure Laterality Date     SECTION      PANCREAS SURGERY      "pseudocysts" per patient's  Ricki Olson    [place] ESOPHAGOGASTRODUODENOSCOPY TRANSORAL DIAGNOSTIC N/A 4/10/2018    Procedure: EGD AND COLONOSCOPY;  Surgeon: Juliette Cochran MD;  [place] [facility]; Service: Gastroenterology       Meds/Allergies:    PTA meds:   Prior to Admission Medications   Prescriptions Last Dose Informant Patient Reported? Taking?   budesonide-formoterol (SYMBICORT) 160-4 5 mcg/act inhaler   Yes Yes   Sig: Inhale 2 puffs 2 (two) times a day   clonazePAM (KlonoPIN) 1 mg tablet   No Yes   Sig: Take 1 tablet (1 mg total) by mouth 2 (two) times a day At 9AM and 6PM   fluticasone (FLONASE) 50 mcg/act nasal spray   Yes Yes   Si sprays into each nostril daily Not taking recently    gabapentin (NEURONTIN) 400 mg capsule   No Yes   Sig: Take 1 capsule (400 mg total) by mouth 3 (three) times a day At 9AM, 4PM, and 9PM   lisinopril (ZESTRIL) 20 mg tablet   No Yes   Sig: Take 1 tablet (20 mg total) by mouth daily At 9AM   mirtazapine (REMERON) 15 mg tablet   No Yes   Sig: Take 1/2 to one tablet at bedtime   omeprazole (PriLOSEC) 40 MG capsule   Yes Yes   Sig: Take 1 capsule by mouth Daily   propranolol (INDERAL) 10 mg tablet   No Yes   Sig: Take 1 tablet (10 mg total) by mouth 2 (two) times a day At 9AM and 6PM   traMADol (ULTRAM) 50 mg tablet   No Yes   Sig: Take 2 tablets (100 mg total) by mouth every 8 (eight) hours as needed for moderate pain   venlafaxine (EFFEXOR-XR) 75 mg 24 hr capsule   No Yes   Sig: Take 1 capsule (75 mg total) by mouth daily At 9AM      Facility-Administered Medications: None       Allergies:    Allergies   Allergen Reactions    Ibuprofen Other (See Comments)    Penicillins Other (See Comments)     ? hives    Sulfa Antibiotics Other (See Comments)     sloughing skin in mouth    Sulfasalazine        Social History:     Marital Status: /Civil Union    Substance Use History:   History   Alcohol Use    Yes     Comment: vodka and beer, has decreased to occasionally     History   Smoking Status    Current Every Day Smoker    Packs/day: 1 50    Types: Cigarettes   Smokeless Tobacco    Current User     History   Drug Use    Types: Marijuana       Family History:    Family History   Problem Relation Age of Onset    Arthritis Mother     Depression Neg Hx        Physical Exam:     Vitals:   Blood Pressure: 113/78 (10/28/18 1139)  Pulse: 98 (10/28/18 1139)  Temperature: (!) 97 °F (36 1 °C) (10/28/18 1139)  Temp Source: Temporal (10/28/18 1139)  Respirations: 16 (10/28/18 1139)  Height: 5' 5" (165 1 cm) (10/28/18 1139)  Weight - Scale: 78 5 kg (173 lb) (10/28/18 1139)  SpO2: 95 % (10/28/18 1139)    Physical Exam   Constitutional: She is oriented to person, place, and time  She appears well-developed and well-nourished  HENT:   Head: Normocephalic and atraumatic  Right Ear: External ear normal    Left Ear: External ear normal    Mouth/Throat: Oropharynx is clear and moist    Eyes: Pupils are equal, round, and reactive to light  Conjunctivae and EOM are normal    Neck: Normal range of motion  Neck supple  Cardiovascular: Normal rate, regular rhythm, normal heart sounds and intact distal pulses  Pulmonary/Chest: Effort normal and breath sounds normal    Tenderness on palpation of right side of the chest   Abdominal: Soft  Bowel sounds are normal  She exhibits no mass  There is no tenderness  There is no rebound and no guarding  Genitourinary:   Genitourinary Comments: deferred   Musculoskeletal: Normal range of motion  Neurological: She is alert and oriented to person, place, and time  Skin: Skin is warm and dry  No rash noted     Psychiatric:   Very anxious and tearful   Nursing note and vitals reviewed  Additional Data:     Lab Results: I have personally reviewed pertinent reports  Results from last 7 days  Lab Units 10/28/18  0800   WBC Thousand/uL 6 40   HEMOGLOBIN g/dL 12 9   HEMATOCRIT % 38 2   PLATELETS Thousands/uL 229   NEUTROS PCT % 70*   LYMPHS PCT % 24   MONOS PCT % 5   EOS PCT % 1       Results from last 7 days  Lab Units 10/28/18  0800   SODIUM mmol/L 137   POTASSIUM mmol/L 4 6   CHLORIDE mmol/L 103   CO2 mmol/L 28   BUN mg/dL 17   CREATININE mg/dL 0 71   ANION GAP mmol/L 6   CALCIUM mg/dL 9 4             No results found for: HGBA1C            Imaging: I have personally reviewed pertinent reports  XR ribs right w pa chest min 3 views    (Results Pending)       EKG, Pathology, and Other Studies Reviewed on Admission:   · EKG:   Ordered    ** Please Note: This note has been constructed using a voice recognition system   **

## 2018-10-28 NOTE — ASSESSMENT & PLAN NOTE
Controlled The current medical regimen is effective;  continue present plan and medications   Home medications of lisinopril and inderal

## 2018-10-28 NOTE — PROGRESS NOTES
Admission Note   Patient was admitted from the ED at 1046 Patient extremely depressed  Stated she is not SI or HI She has had problems since the age of 11  She has been on many different Psych medicine over the years and none seem to help  She has a lot of stress right now because  lost his job , and they did not have any insurance for a while  At this time patient  Had Bronchitis and had no insurance  Patient stated that she was coughing so hard she fractured ribs  Patient stated that she still has a few fractured ribs  At home she wears a brace but does not use it all the time   Patient was oriented to unit and will have ect tomorrow if cleared in time

## 2018-10-28 NOTE — H&P
Initial Psychiatric Evaluation    Medical Record Number: [id number]  Encounter: [id number]      History:     Matthew Farmer is an 46 y o , female, admitted to the psychiatric unit under a 201 status to Dr Dyan Reyes' service with the chief complaint of depression, anxiety, suicidal ideations  The patient was referred by Rosa Davis due to her refractory depression  Patient was referred to us for ECT treatment  The patient has been increasingly depressed and unable to function  She has been staying at bed and not eating well  Her  has been bringing her food and drink  She is not taking care of her home as she usually does  She is very tearful  She has had depression most of her life  She feels very guilty because she feels that her daughter has depression because of her  The patient states she is fearful about ECT but is hopeful  The patient had an overdose of lithium in May of 2018  Patient has passive suicidal ideations but not a current plan  She was transferred to the psychiatric unit once medically cleared  The patient is alert and oriented x4  She is very anxious  She has soft speech  Patient states she has been depressed and anxious most of her life  She states that she has been on so many medications and nothing seems to work  She states that one of the doctors she saw used to think she was bipolar however she does not believe so  Patient denies any history of mood swings or impulsive behaviors  Patient states her last hospitalization was [facility] when she overdosed on lithium in May of 2018  Patient states she also had a suicide attempt at age 11 where she tried to cut herself  Patient has suicidal ideations currently but denies a plan and states she feels safe in the hospital  Patient states she has been having suicidal thoughts but no plans  She denies any history of hallucinations  She denies any homicidal ideation    Patient is very anxious for ECT  Patient is hopeful for treatment because she feels that she can no longer live in this way  She states she has been taking her medications from [email or web address] which include Remeron, Effexor, and Klonopin  Patient states she has been sleeping most of the day  She sometimes goes 3 days without eating  Patient states she has no friends and feels that she let down her family  Patient has a blunted affect  She appears timid and anxious  Past Medical History:   Diagnosis Date    Anxiety     [facility]     Chronic pain disorder     [facility]     Depression     Hyperlipidemia     Hypertension     Psychiatric disorder     Self-injurious behavior     Sleep difficulties     Suicide attempt ([facility] )        Past surgical history:  Past Surgical History:   Procedure Laterality Date     SECTION      PANCREAS SURGERY      "pseudocysts" per patient's  Ricki Olson    [place] ESOPHAGOGASTRODUODENOSCOPY TRANSORAL DIAGNOSTIC N/A 4/10/2018    Procedure: EGD AND COLONOSCOPY;  Surgeon: Vee Haywood MD;  [place] [facility]  GI [facility];   Service: Gastroenterology       Family history:  Family History   Problem Relation Age of Onset    Depression Neg Hx        Current medications:    Current Facility-Administered Medications:     acetaminophen (TYLENOL) tablet 650 mg, 650 mg, Oral, Q4H PRN, Mine Tan PA-C    clonazePAM (KlonoPIN) tablet 1 mg, 1 mg, Oral, BID, Radha Roman PA-C    LORazepam (ATIVAN) 2 mg/mL injection 0 5 mg, 0 5 mg, Intramuscular, Q6H PRN, Mine Tan PA-C    LORazepam (ATIVAN) tablet 0 5 mg, 0 5 mg, Oral, Q6H PRN, Mine Tan PA-C    mirtazapine (REMERON) tablet 7 5 mg, 7 5 mg, Oral, HS, Radha Roman PA-C    nicotine (NICODERM CQ) 14 mg/24hr TD 24 hr patch 1 patch, 1 patch, Transdermal, Daily, Radha Roman PA-C    OLANZapine (ZyPREXA) IM injection 5 mg, 5 mg, Intramuscular, Q6H PRN, [facility] Ariel Andrade PA-C    OLANZapine (ZyPREXA) tablet 5 mg, 5 mg, Oral, Q6H PRN, Shabnam Jenkins PA-C    traZODone (DESYREL) tablet 50 mg, 50 mg, Oral, HS PRN, Shabnam Jenkins PA-C    venlafaxine (EFFEXOR-XR) 24 hr capsule 75 mg, 75 mg, Oral, Daily, Shabnam Jenkins PA-C      Allergies: Allergies   Allergen Reactions    Ibuprofen Other (See Comments)    Penicillins Other (See Comments)     ? hives    Sulfa Antibiotics Other (See Comments)     sloughing skin in mouth    Sulfasalazine        Social History:  Social History     Social History    Marital status: /Civil Union     Spouse name: N/A    Number of children: N/A    Years of education: N/A     Occupational History    Not on file       Social History Main Topics    Smoking status: Current Every Day Smoker     Packs/day: 1 50     Types: Cigarettes    Smokeless tobacco: Current User    Alcohol use Yes      Comment: vodka and beer, has decreased to occasionally    Drug use: Yes     Types: Marijuana    Sexual activity: Not on file      Comment: PT is      Other Topics Concern    Not on file     Social History Narrative    No narrative on file         Physical Examination:     Vital Signs:  Vitals:    10/28/18 0744 10/28/18 1139   BP: 138/86 113/78   BP [place] Left arm Left arm   Pulse: 101 98   Resp: 16 16   Temp: 98 1 °F (36 7 °C) (!) 97 °F (36 1 °C)   TempSrc: Tympanic Temporal   SpO2: 95% 95%   Weight: 78 5 kg (173 lb) 78 5 kg (173 lb)   Height:  5' 5" (1 651 m)         Appearance:  age appropriate and casually dressed   Behavior:  restless and fidgety   Speech:  soft   Mood:  anxious, constricted and depressed   Affect:  constricted   Thought Process:  normal   Thought Content:  normal   Perceptual Disturbances: None   Risk Potential: Suicidal Ideations without plan   Sensorium:  person, place, situation and time   Cognition:  intact   Consciousness:  alert and awake    Attention: attention span and concentration were age appropriate   Intellect: average   Insight:  good   Judgment: good      Motor Activity: no abnormal movements           Diagnostic Studies:     Recent Labs:  Results Reviewed     Procedure Component Value Units Date/Time    Rapid drug screen, urine [[id number]]  (Abnormal) Collected:  10/28/18 0858    Lab Status:  Final result Specimen:  Urine from Urine, Catheter Updated:  10/28/18 0921     Amph/Meth UR Negative     Barbiturate Ur Positive (A)     Benzodiazepine Urine Negative     Cocaine Urine Negative     Methadone Urine Negative     Opiate Urine Negative     PCP Ur Negative     THC Urine Positive (A)    Narrative:         Presumptive report  If requested, specimen will be sent to reference lab for confirmation  FOR MEDICAL PURPOSES ONLY  IF CONFIRMATION NEEDED PLEASE CONTACT THE LAB WITHIN 5 DAYS  Drug Screen Cutoff Levels:  AMPHETAMINE/METHAMPHETAMINES  1000 ng/mL  BARBITURATES     200 ng/mL  BENZODIAZEPINES     200 ng/mL  COCAINE      300 ng/mL  METHADONE      300 ng/mL  OPIATES      300 ng/mL  PHENCYCLIDINE     25 ng/mL  THC       50 ng/mL    Basic metabolic panel [[id number]]  (Abnormal) Collected:  10/28/18 0800    Lab Status:  Final result Specimen:  Blood from Arm, Right Updated:  10/28/18 0817     Sodium 137 mmol/L      Potassium 4 6 mmol/L      Chloride 103 mmol/L      CO2 28 mmol/L      ANION GAP 6 mmol/L      BUN 17 mg/dL      Creatinine 0 71 mg/dL      Glucose 122 (H) mg/dL      Calcium 9 4 mg/dL      eGFR 100 ml/min/1 73sq m     Narrative:         National Kidney Disease Education Program recommendations are as follows:  GFR calculation is accurate only with a steady state creatinine  Chronic Kidney disease less than 60 ml/min/1 73 sq  meters  Kidney failure less than 15 ml/min/1 73 sq  meters      Ethanol [[id number]]  (Normal) Collected:  10/28/18 0800    Lab Status:  Final result Specimen:  Blood from Arm, Right Updated:  10/28/18 0817     Ethanol Lvl <10 mg/dL     CBC and differential [[id number]] (Abnormal) Collected:  10/28/18 0800    Lab Status:  Final result Specimen:  Blood from Arm, Right Updated:  10/28/18 0810     WBC 6 40 Thousand/uL      RBC 3 38 (L) Million/uL      Hemoglobin 12 9 g/dL      Hematocrit 38 2 %       (H) fL      MCH 38 1 (H) pg      MCHC 33 7 g/dL      RDW 15 6 (H) %      MPV 7 5 (L) fL      Platelets 577 Thousands/uL      Neutrophils Relative 70 (H) %      Lymphocytes Relative 24 %      Monocytes Relative 5 %      Eosinophils Relative 1 %      Basophils Relative 1 %      Neutrophils Absolute 4 50 Thousands/µL      Lymphocytes Absolute 1 50 Thousands/µL      Monocytes Absolute 0 30 Thousand/µL      Eosinophils Absolute 0 10 Thousand/µL      Basophils Absolute 0 00 Thousands/µL           I/O Past 24 hours:  No intake/output data recorded  No intake/output data recorded  Impression / Plan:     Severe episode of recurrent major depressive disorder, without psychotic features ([facility] [address] )    Recommended Treatment:      Medications  1) Continue current medication regimen  ECT 1 tomorrow pending clearances  Non-pharmacological treatments  1) Continue with group therapy, milieu therapy and occupational therapy  2) Medical will be consulted to help manage comorbid conditions    Safety  1) Safety/communication plan established targeting dynamic risk factors above  Counseling / Coordination of Care    Total floor / unit time spent today 50 minutes  Greater than 50% of total time was spent with the patient and / or family counseling and / or coordination of care  A description of the counseling / coordination of care  Patient's Rights, confidentiality and exceptions to confidentiality, use of automated medical record, [facility] Jaramillo staff access to medical record, and consent to treatment reviewed        Swapnil Amaya PA-C

## 2018-10-29 RX ORDER — TIZANIDINE 4 MG/1
2 TABLET ORAL EVERY 8 HOURS PRN
Status: DISCONTINUED | OUTPATIENT
Start: 2018-10-29 | End: 2018-10-31 | Stop reason: HOSPADM

## 2018-10-29 RX ORDER — BUTALBITAL, ACETAMINOPHEN AND CAFFEINE 50; 325; 40 MG/1; MG/1; MG/1
1 TABLET ORAL EVERY 12 HOURS PRN
Status: DISCONTINUED | OUTPATIENT
Start: 2018-10-29 | End: 2018-10-31 | Stop reason: HOSPADM

## 2018-10-29 RX ORDER — LISINOPRIL 20 MG/1
20 TABLET ORAL DAILY
Status: DISCONTINUED | OUTPATIENT
Start: 2018-10-29 | End: 2018-10-29 | Stop reason: SDUPTHER

## 2018-10-29 RX ORDER — ONDANSETRON 4 MG/1
4 TABLET, ORALLY DISINTEGRATING ORAL EVERY 6 HOURS PRN
Status: DISCONTINUED | OUTPATIENT
Start: 2018-10-29 | End: 2018-10-31 | Stop reason: HOSPADM

## 2018-10-29 RX ORDER — VENLAFAXINE HYDROCHLORIDE 150 MG/1
150 CAPSULE, EXTENDED RELEASE ORAL DAILY
Status: DISCONTINUED | OUTPATIENT
Start: 2018-10-30 | End: 2018-10-31 | Stop reason: HOSPADM

## 2018-10-29 RX ORDER — MIRTAZAPINE 30 MG/1
30 TABLET, FILM COATED ORAL
Status: DISCONTINUED | OUTPATIENT
Start: 2018-10-29 | End: 2018-10-31 | Stop reason: HOSPADM

## 2018-10-29 RX ORDER — DICYCLOMINE HYDROCHLORIDE 10 MG/1
20 CAPSULE ORAL EVERY 6 HOURS PRN
Status: DISCONTINUED | OUTPATIENT
Start: 2018-10-29 | End: 2018-10-31 | Stop reason: HOSPADM

## 2018-10-29 RX ADMIN — BUDESONIDE AND FORMOTEROL FUMARATE DIHYDRATE 2 PUFF: 160; 4.5 AEROSOL RESPIRATORY (INHALATION) at 08:06

## 2018-10-29 RX ADMIN — MIRTAZAPINE 30 MG: 30 TABLET, FILM COATED ORAL at 21:08

## 2018-10-29 RX ADMIN — BUDESONIDE AND FORMOTEROL FUMARATE DIHYDRATE 2 PUFF: 160; 4.5 AEROSOL RESPIRATORY (INHALATION) at 17:09

## 2018-10-29 RX ADMIN — PANTOPRAZOLE SODIUM 20 MG: 20 TABLET, DELAYED RELEASE ORAL at 06:29

## 2018-10-29 RX ADMIN — TRAMADOL HYDROCHLORIDE 50 MG: 50 TABLET, COATED ORAL at 21:41

## 2018-10-29 RX ADMIN — GABAPENTIN 400 MG: 400 CAPSULE ORAL at 21:08

## 2018-10-29 RX ADMIN — TRAMADOL HYDROCHLORIDE 50 MG: 50 TABLET, COATED ORAL at 15:15

## 2018-10-29 RX ADMIN — GABAPENTIN 400 MG: 400 CAPSULE ORAL at 08:06

## 2018-10-29 RX ADMIN — TRAMADOL HYDROCHLORIDE 50 MG: 50 TABLET, COATED ORAL at 07:35

## 2018-10-29 RX ADMIN — PROPRANOLOL HYDROCHLORIDE 10 MG: 10 TABLET ORAL at 17:08

## 2018-10-29 RX ADMIN — CLONAZEPAM 1 MG: 1 TABLET ORAL at 08:06

## 2018-10-29 RX ADMIN — LISINOPRIL 20 MG: 20 TABLET ORAL at 08:06

## 2018-10-29 RX ADMIN — PROPRANOLOL HYDROCHLORIDE 10 MG: 10 TABLET ORAL at 08:07

## 2018-10-29 RX ADMIN — VENLAFAXINE HYDROCHLORIDE 75 MG: 75 CAPSULE, EXTENDED RELEASE ORAL at 08:06

## 2018-10-29 RX ADMIN — NICOTINE 1 PATCH: 14 PATCH, EXTENDED RELEASE TRANSDERMAL at 08:06

## 2018-10-29 RX ADMIN — LORAZEPAM 0.5 MG: 0.5 TABLET ORAL at 05:51

## 2018-10-29 RX ADMIN — GABAPENTIN 400 MG: 400 CAPSULE ORAL at 15:16

## 2018-10-29 RX ADMIN — CLONAZEPAM 1 MG: 1 TABLET ORAL at 17:08

## 2018-10-29 NOTE — PLAN OF CARE
Problem: Depression  Goal: Treatment Goal: Demonstrate behavioral control of depressive symptoms, verbalize feelings of improved mood/affect, and adopt new coping skills prior to discharge  Outcome: Progressing    Goal: Verbalize thoughts and feelings  Interventions:  - Assess and re-assess patient's level of risk   - Engage patient in 1:1 interactions, daily, for a minimum of 15 minutes   - Encourage patient to express feelings, fears, frustrations, hopes    Outcome: Progressing    Goal: Refrain from harming self  Interventions:  - Monitor patient closely, per order   - Supervise medication ingestion, monitor effects and side effects    Outcome: Progressing    Goal: Refrain from isolation  Interventions:  - Develop a trusting relationship   - Encourage socialization    Outcome: Progressing    Goal: Refrain from self-neglect  Outcome: Progressing    Goal: Attend and participate in unit activities, including therapeutic, recreational, and educational groups  Interventions:  - Provide therapeutic and educational activities daily, encourage attendance and participation, and document same in the medical record    Outcome: Adequate for Discharge    Goal: Complete daily ADLs, including personal hygiene independently, as able  Interventions:  - Observe, teach, and assist patient with ADLS  -  Monitor and promote a balance of rest/activity, with adequate nutrition and elimination    Outcome: Progressing      Problem: Electroconvulsive therapy (ECT)  Goal: Treatment Goal: Demonstrate a reduction of confusion and improved orientation to person, place, time and/or situation upon discharge, according to optimum baseline/ability  Outcome: Progressing    Goal: Verbalizes/displays adequate comfort level or baseline comfort level  Interventions:  - Encourage patient to monitor pain and request assistance  - Assess pain using appropriate pain scale  - Administer analgesics based on type and severity of pain and evaluate response  - Implement non-pharmacological measures as appropriate and evaluate response  - Consider cultural and social influences on pain and pain management  - Notify physician/advanced practitioner if interventions unsuccessful or patient reports new pain   Outcome: Progressing    Goal: Achieves stable or improved neurological status  INTERVENTIONS  - Monitor and report changes in neurological status  - Initiate measures to prevent increased intracranial pressure  - Maintain blood pressure and fluid volume within ordered parameters to optimize cerebral perfusion  - Monitor temperature, glucose, and sodium or any other associated labs   Initiate appropriate interventions as ordered  - Monitor for seizure activity   - Administer anti-seizure medications as ordered   Outcome: Progressing    Goal: Achieves maximal functionality and self care  INTERVENTIONS  - Monitor swallowing and airway patency with patient fatigue and changes in neurological status  - Encourage and assist patient to increase activity and self care with guidance from rehab services  - Encourage visually impaired, hearing impaired and aphasic patients to use assistive/communication devices   Outcome: Progressing    Goal: Maintain or return mobility to safest level of function  INTERVENTIONS:  - Assess patient's ability to carry out ADLs; assess patient's baseline for ADL function and identify physical deficits which impact ability to perform ADLs (bathing, care of mouth/teeth, toileting, grooming, dressing, etc )  - Assess/evaluate cause of self-care deficits   - Assess range of motion  - Assess patient's mobility; develop plan if impaired  - Assess patient's need for assistive devices and provide as appropriate  - Encourage maximum independence but intervene and supervise when necessary  - Involve family in performance of ADLs  - Assess for home care needs following discharge   - Request OT consult to assist with ADL evaluation and planning for discharge  - Provide patient education as appropriate   Outcome: Progressing    Goal: Absence of urinary retention  INTERVENTIONS:  - Assess patients ability to void and empty bladder  - Monitor I/O  - Bladder scan as needed  - Discuss with physician/AP medications to alleviate retention as needed  - Discuss catheterization for long term situations as appropriate   Outcome: Not Progressing    Goal: Minimal or absence of nausea and/or vomiting  INTERVENTIONS:  - Administer IV fluids as ordered to ensure adequate hydration  - Maintain NPO status until nausea and vomiting are resolved  - Nasogastric tube as ordered  - Administer ordered antiemetic medications as needed  - Provide nonpharmacologic comfort measures as appropriate  - Advance diet as tolerated, if ordered  - Nutrition services referral to assist patient with adequate nutrition and appropriate food choices   Outcome: Not Progressing    Goal: Maintains adequate nutritional intake  INTERVENTIONS:  - Monitor percentage of each meal consumed  - Identify factors contributing to decreased intake, treat as appropriate  - Assist with meals as needed  - Monitor I&O, WT and lab values  - Obtain nutrition services referral as needed   Outcome: Progressing      Comments: Patient tearful and whiny upon approach  Patient reports 10/10 rib pain and complaining that "the beds are horrible" Immediately requests Utram for pain and was given same  Reports 4/4 anxiety, 8/10 depression and denies S/HI,A/VH at this time  Refused Lidocaine patch at medication pass  Patient med and meal compliant otherwise  Patient has been tearful most of the morning  Appears child like at times  Hyper-focused on her pain and ECT treatments  Patient is visible but not social with peers  Will continue to monitor and provide therapeutic support
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
## 2018-10-29 NOTE — PROGRESS NOTES
Dr Deirdre Reyes made aware psych would like her to review radiographs of ribs for ECT clearance  Dr Janet Diggs requesting EKG results  EKG copy faxed to Dr Janet Diggs at [phone]  Will monitor

## 2018-10-29 NOTE — PROGRESS NOTES
Psychiatry Progress Note    Subjective: Interval History     Patient anxious and tearful throughout the morning  Patient upset she was not medically cleared to have ECT completed this morning  Patient expressing anxiety and multiple complaints about the unit  Patient reports that she has been having chronic rib pain for the past 2 years after experiencing rib fracture secondary to bronchitis  Patient also reports a history of a herniated lumbar disc which she feels has been exacerbated due to the discomfort of the bed on the unit  Patient expressing that she does want have ECT done due to the ongoing severity of her depression however does not know how she will be able to be comfortable enough to continue treatment in the hospital   The patient denying any suicidal ideations  Patient with no homicidal ideations or psychosis        Current medications:    Current Facility-Administered Medications:     acetaminophen (TYLENOL) tablet 650 mg, 650 mg, Oral, Q4H PRN, Brion Romberg, PA-C    albuterol (PROVENTIL HFA,VENTOLIN HFA) inhaler 2 puff, 2 puff, Inhalation, Q4H PRN, Liliana Dior MD    budesonide-formoterol (SYMBICORT) 160-4 5 mcg/act inhaler 2 puff, 2 puff, Inhalation, BID, Liliana Dior MD, 2 puff at 10/29/18 0806    clonazePAM (KlonoPIN) tablet 1 mg, 1 mg, Oral, BID, Brion Romberg, PA-C, 1 mg at 10/29/18 0806    gabapentin (NEURONTIN) capsule 400 mg, 400 mg, Oral, TID, Liliana Dior MD, 400 mg at 10/29/18 0806    lidocaine (LIDODERM) 5 % patch 1 patch, 1 patch, Topical, Daily, Liliana Dior MD, Stopped at 10/28/18 1613    lisinopril (ZESTRIL) tablet 20 mg, 20 mg, Oral, Daily, Liliana Dior MD, 20 mg at 10/29/18 0806    LORazepam (ATIVAN) 2 mg/mL injection 0 5 mg, 0 5 mg, Intramuscular, Q6H PRN, Brion Romberg, PA-C    LORazepam (ATIVAN) tablet 0 5 mg, 0 5 mg, Oral, Q6H PRN, Brion Romberg, PA-C, 0 5 mg at 10/29/18 0551    mirtazapine (REMERON) tablet 7 5 mg, 7 5 mg, Oral, HS, Radha BINA Roman, 7 5 mg at 10/28/18 2109    nicotine (NICODERM CQ) 14 mg/24hr TD 24 hr patch 1 patch, 1 patch, Transdermal, Daily, Jamar Faria PA-C, 1 patch at 10/29/18 0806    OLANZapine (ZyPREXA) IM injection 5 mg, 5 mg, Intramuscular, Q6H PRN, Jamar Faria PA-C    OLANZapine (ZyPREXA) tablet 5 mg, 5 mg, Oral, Q6H PRN, Jamar Faria PA-C    pantoprazole (PROTONIX) EC tablet 20 mg, 20 mg, Oral, Early Morning, Rosenda Light MD, 20 mg at 10/29/18 8446    propranolol (INDERAL) tablet 10 mg, 10 mg, Oral, BID, Rosenda Light MD, 10 mg at 10/29/18 0807    traMADol (ULTRAM) tablet 50 mg, 50 mg, Oral, Q6H PRN, Rosenda Light MD, 50 mg at 10/29/18 0735    traZODone (DESYREL) tablet 50 mg, 50 mg, Oral, HS PRN, Jamar Faria PA-C, 50 mg at 10/28/18 2109    [START ON 10/30/2018] venlafaxine (EFFEXOR-XR) 24 hr capsule 150 mg, 150 mg, Oral, Daily, Liliya Nguyen PA-C    Current Problem List:    Patient Active Problem List   Diagnosis    Chronic diarrhea    Irritable bowel syndrome with diarrhea    Recent weight loss    Abdominal pain    Psychiatric disorder    Hypertension    Hyperlipidemia    Severe episode of recurrent major depressive disorder, without psychotic features ([facility] [address] )    COPD without exacerbation ([facility] [address] )    Generalized anxiety disorder    Right knee pain    Anxiety state    Sinus tachycardia    History of rib fracture    Tobacco abuse       Problem list reviewed 10/29/18     Objective:     Vital Signs:  Vitals:    10/28/18 1500 10/28/18 1715 10/29/18 0723 10/29/18 0815   BP: 133/91 130/92 (!) 167/109 143/93   BP [place]   Left arm Left arm   Pulse: 98 98 (!) 108 (!) 120   Resp:   18    Temp:   98 5 °F (36 9 °C)    TempSrc:   Temporal    SpO2:       Weight:       Height:             Appearance:  age appropriate, casually dressed and disheveled   Behavior:  normal   Speech:  normal volume   Mood:  anxious and depressed   Affect:  labile   Thought Process:  normal   Thought Content:  normal   Perceptual Disturbances: None   Risk Potential: none   Sensorium:  person, place and time   Cognition:  intact   Consciousness:  alert and awake    Attention: attention span and concentration were age appropriate   Intellect: average   Insight:  limited   Judgment: limited      Motor Activity: no abnormal movements           Recent Labs:  Results Reviewed     Procedure Component Value Units Date/Time    TSH, 3rd generation [[id number]]  (Normal) Collected:  10/28/18 0800    Lab Status:  Final result Specimen:  Blood from Arm, Right Updated:  10/28/18 1502     TSH 3RD GENERATON 1 390 uIU/mL     Narrative: The recommended reference ranges for TSH during pregnancy are as follows:  First trimester 0 1 to 2 5 uIU/mL  Second trimester  0 2 to 3 0 uIU/mL  Third trimester 0 3 to 3 0 uIU/m      Patients undergoing fluorescein dye angiography may retain small amounts of fluorescein in the body for 48-72 hours post procedure  Samples containing fluorescein can produce falsely depressed TSH values  If the patient had this procedure,a specimen should be resubmitted post fluorescein clearance  Rapid drug screen, urine [[id number]]  (Abnormal) Collected:  10/28/18 0858    Lab Status:  Final result Specimen:  Urine from Urine, Catheter Updated:  10/28/18 0810     Amph/Meth UR Negative     Barbiturate Ur Positive (A)     Benzodiazepine Urine Negative     Cocaine Urine Negative     Methadone Urine Negative     Opiate Urine Negative     PCP Ur Negative     THC Urine Positive (A)    Narrative:         Presumptive report  If requested, specimen will be sent to reference lab for confirmation  FOR MEDICAL PURPOSES ONLY  IF CONFIRMATION NEEDED PLEASE CONTACT THE LAB WITHIN 5 DAYS      Drug Screen Cutoff Levels:  AMPHETAMINE/METHAMPHETAMINES  1000 ng/mL  BARBITURATES     200 ng/mL  BENZODIAZEPINES     200 ng/mL  COCAINE      300 ng/mL  METHADONE      300 ng/mL  OPIATES      300 ng/mL  PHENCYCLIDINE     25 ng/mL  THC       50 ng/mL    Basic metabolic panel [[id number]]  (Abnormal) Collected:  10/28/18 0800    Lab Status:  Final result Specimen:  Blood from Arm, Right Updated:  10/28/18 0817     Sodium 137 mmol/L      Potassium 4 6 mmol/L      Chloride 103 mmol/L      CO2 28 mmol/L      ANION GAP 6 mmol/L      BUN 17 mg/dL      Creatinine 0 71 mg/dL      Glucose 122 (H) mg/dL      Calcium 9 4 mg/dL      eGFR 100 ml/min/1 73sq m     Narrative:         National Kidney Disease Education Program recommendations are as follows:  GFR calculation is accurate only with a steady state creatinine  Chronic Kidney disease less than 60 ml/min/1 73 sq  meters  Kidney failure less than 15 ml/min/1 73 sq  meters  Ethanol [[id number]]  (Normal) Collected:  10/28/18 0800    Lab Status:  Final result Specimen:  Blood from Arm, Right Updated:  10/28/18 0817     Ethanol Lvl <10 mg/dL     CBC and differential [[id number]]  (Abnormal) Collected:  10/28/18 0800    Lab Status:  Final result Specimen:  Blood from Arm, Right Updated:  10/28/18 0810     WBC 6 40 Thousand/uL      RBC 3 38 (L) Million/uL      Hemoglobin 12 9 g/dL      Hematocrit 38 2 %       (H) fL      MCH 38 1 (H) pg      MCHC 33 7 g/dL      RDW 15 6 (H) %      MPV 7 5 (L) fL      Platelets 314 Thousands/uL      Neutrophils Relative 70 (H) %      Lymphocytes Relative 24 %      Monocytes Relative 5 %      Eosinophils Relative 1 %      Basophils Relative 1 %      Neutrophils Absolute 4 50 Thousands/µL      Lymphocytes Absolute 1 50 Thousands/µL      Monocytes Absolute 0 30 Thousand/µL      Eosinophils Absolute 0 10 Thousand/µL      Basophils Absolute 0 00 Thousands/µL           I/O Past 24 hours:  No intake/output data recorded  No intake/output data recorded  Assessment / Plan:     Severe episode of recurrent major depressive disorder, without psychotic features ([facility] [address] )    Recommended Treatment:      Medication changes:  1) increase Effexor 150 mg daily    Continue suicide precautions  We will contact primary medical team for medical clearance to begin ECT  Non-pharmacological treatments  1) Continue with group therapy, milieu therapy and occupational therapy  Safety  1) Safety/communication plan established targeting dynamic risk factors above  2) Risks, benefits, and possible side effects of medications explained to patient and patient verbalizes understanding  Counseling / Coordination of Care    Total floor / unit time spent today 20 minutes  Greater than 50% of total time was spent with the patient and / or family counseling and / or coordination of care  A description of the counseling / coordination of care  Patient's Rights, confidentiality and exceptions to confidentiality, use of automated medical record, [address] [place] [address] staff access to medical record, and consent to treatment reviewed      [facility] Client, BINA

## 2018-10-29 NOTE — NURSING NOTE
Ino Caal regarding ECT this morning and she informed RN patient will be receiving treatment if tests are cleared  Patient ready for treatment  Paging Leora Krabbe, waiting for final answer

## 2018-10-29 NOTE — PROGRESS NOTES
Patient approached this author and and was inquiring about her ECT clearance  Patient advised that she has been medically cleared for ECT at this time and the first one would most likely be Wednesday 10/31/18  Patient stated, "I guess I will stick it out then  I have been having a rough time here " Patient encouraged to ome to this Amor Duet if she had any further questions or concerns  Will monitor

## 2018-10-29 NOTE — PROGRESS NOTES
Patient had ekg done which is normal sinus rhythm at 90 beats per minute  She also had chest x-ray rib series done which shows old healing fractures which are nondisplaced  She does complain of chronic pain on the right side of her chest   At this point in time I will clear her medically to proceed with ECT treatments  I have discussed with anesthesia doctor Anni Sevilla as well  Take precautions to avoid any trauma to the right chest wall secondary to her recent fractures during the ECT procedure and post procedure

## 2018-10-29 NOTE — DISCHARGE INSTR - OTHER ORDERS
If you are experiencing a mental health emergency, you may call the [id number] [place] [address] 24 hours a day, 7 days per week at [phone]  In [facility], call [phone]  When you need someone to listen, the [facility] is available for 16 hours a day, 7 days a week, from the time of 7-10am and 2pm-2am   It is not available from the hours of 2am-6am and 10am-2pm  A representative can be reached at [phone]

## 2018-10-29 NOTE — NURSING NOTE
Patient visible on unit at start of shift  Quiet, isolative to self  Cooperative with unit routine  Patient reported 4/4 anxiety regarding ECT treatment tomorrow  Support given  Patient gave staff a list of her current medications and the list will be left for psych in the morning  Patient requested a sleeping aid and PRN trazodone given  Patient given Vaseline for chapped lips  Patient retreated to room without issue  Will monitor

## 2018-10-30 ENCOUNTER — ANESTHESIA EVENT (INPATIENT)
Dept: PREOP/PACU | Facility: HOSPITAL | Age: 48
DRG: 753 | End: 2018-10-30
Payer: COMMERCIAL

## 2018-10-30 LAB
ATRIAL RATE: 91 BPM
P AXIS: 72 DEGREES
PR INTERVAL: 148 MS
QRS AXIS: 83 DEGREES
QRSD INTERVAL: 74 MS
QT INTERVAL: 358 MS
QTC INTERVAL: 440 MS
T WAVE AXIS: 75 DEGREES
VENTRICULAR RATE: 91 BPM

## 2018-10-30 PROCEDURE — 93010 ELECTROCARDIOGRAM REPORT: CPT | Performed by: INTERNAL MEDICINE

## 2018-10-30 RX ORDER — CLONAZEPAM 1 MG/1
1 TABLET ORAL DAILY
Status: DISCONTINUED | OUTPATIENT
Start: 2018-10-31 | End: 2018-10-31 | Stop reason: HOSPADM

## 2018-10-30 RX ORDER — TRAMADOL HYDROCHLORIDE 50 MG/1
50 TABLET ORAL EVERY 4 HOURS PRN
Status: DISCONTINUED | OUTPATIENT
Start: 2018-10-30 | End: 2018-10-31 | Stop reason: HOSPADM

## 2018-10-30 RX ORDER — TRAZODONE HYDROCHLORIDE 100 MG/1
100 TABLET ORAL
Status: DISCONTINUED | OUTPATIENT
Start: 2018-10-30 | End: 2018-10-31 | Stop reason: HOSPADM

## 2018-10-30 RX ORDER — SODIUM CHLORIDE 9 MG/ML
125 INJECTION, SOLUTION INTRAVENOUS CONTINUOUS
Status: CANCELLED | OUTPATIENT
Start: 2018-10-31

## 2018-10-30 RX ADMIN — MIRTAZAPINE 30 MG: 30 TABLET, FILM COATED ORAL at 21:28

## 2018-10-30 RX ADMIN — PROPRANOLOL HYDROCHLORIDE 10 MG: 10 TABLET ORAL at 17:10

## 2018-10-30 RX ADMIN — GABAPENTIN 400 MG: 400 CAPSULE ORAL at 08:22

## 2018-10-30 RX ADMIN — PANTOPRAZOLE SODIUM 20 MG: 20 TABLET, DELAYED RELEASE ORAL at 06:05

## 2018-10-30 RX ADMIN — CLONAZEPAM 1 MG: 1 TABLET ORAL at 08:22

## 2018-10-30 RX ADMIN — BUDESONIDE AND FORMOTEROL FUMARATE DIHYDRATE 2 PUFF: 160; 4.5 AEROSOL RESPIRATORY (INHALATION) at 17:11

## 2018-10-30 RX ADMIN — VENLAFAXINE HYDROCHLORIDE 150 MG: 150 CAPSULE, EXTENDED RELEASE ORAL at 08:22

## 2018-10-30 RX ADMIN — TRAMADOL HYDROCHLORIDE 50 MG: 50 TABLET, COATED ORAL at 08:28

## 2018-10-30 RX ADMIN — TRAMADOL HYDROCHLORIDE 50 MG: 50 TABLET, COATED ORAL at 17:13

## 2018-10-30 RX ADMIN — GABAPENTIN 400 MG: 400 CAPSULE ORAL at 17:10

## 2018-10-30 RX ADMIN — LIDOCAINE 1 PATCH: 50 PATCH CUTANEOUS at 08:22

## 2018-10-30 RX ADMIN — TRAMADOL HYDROCHLORIDE 50 MG: 50 TABLET, COATED ORAL at 21:32

## 2018-10-30 RX ADMIN — TRAMADOL HYDROCHLORIDE 50 MG: 50 TABLET, COATED ORAL at 12:39

## 2018-10-30 RX ADMIN — BUDESONIDE AND FORMOTEROL FUMARATE DIHYDRATE 2 PUFF: 160; 4.5 AEROSOL RESPIRATORY (INHALATION) at 08:23

## 2018-10-30 RX ADMIN — GABAPENTIN 400 MG: 400 CAPSULE ORAL at 21:28

## 2018-10-30 RX ADMIN — LISINOPRIL 20 MG: 20 TABLET ORAL at 08:22

## 2018-10-30 RX ADMIN — TRAZODONE HYDROCHLORIDE 100 MG: 100 TABLET ORAL at 21:28

## 2018-10-30 RX ADMIN — NICOTINE 1 PATCH: 14 PATCH, EXTENDED RELEASE TRANSDERMAL at 08:23

## 2018-10-30 RX ADMIN — PROPRANOLOL HYDROCHLORIDE 10 MG: 10 TABLET ORAL at 08:21

## 2018-10-30 NOTE — PLAN OF CARE
Problem: DISCHARGE PLANNING  Goal: Discharge to home or other facility with appropriate resources  INTERVENTIONS:  - Identify barriers to discharge w/patient and caregiver  - Arrange for needed discharge resources and transportation as appropriate  - Identify discharge learning needs (meds, outpatient mental health services)  -Deny SI, depression and anxiety  Symptoms will resolve or diminish upon discharge   -Comply with meds, attend 75% of group therapy, identify positive coping skills  - Arrange for interpretive services to assist at discharge as needed  - Refer to Case Management Department for coordinating discharge planning if the patient needs post-hospital services based on physician/advanced practitioner order or complex needs related to functional status, cognitive ability, or social support system   Outcome: Progressing  Worker left voicemail for pt's

## 2018-10-30 NOTE — PROGRESS NOTES
Pt was a little more visible today  Pt reports a 4/4 anxiety and 6/10 depression  Pt also reports 7/10 rib pain and was given Tramadol  Will continue to monitor

## 2018-10-30 NOTE — SOCIAL WORK
Worker met with pt regarding potential discharge tomorrow  Pt explained she will stay for her first ECT and if there are no adverse effects she will leave tomorrow and follow up ECT outpatient  Pt reports if there are any adverse effects, the plan is for Thursday morning  Pt has outpatient psychiatry through [facility]  Pt sees a therapist through a private practice  Pt reports she will schedule her own therapy appointment  Worker will contact [facility]  Pt signed JENNY for [facility] and her

## 2018-10-30 NOTE — PROGRESS NOTES
Psychiatry Progress Note    Subjective: Interval History     Patient continues to express her discontent for the unit  Patient signed her 72 hr notice yesterday  Patient expressing that she wants to be able to do ECT on an outpatient basis  Patient reporting that she feels her depression and anxiety are getting worse with being in the hospital and the acuity  Patient expressing that she does want to trial ECT to help her ongoing depressive symptoms  Patient continues to deny suicidality throughout her hospitalization  Patient with no homicidal ideations or psychosis  Patient reports difficulty sleeping last evening due to not being able to receive Ambien secondary to interference with ECT  Patient in agreement with ongoing down titration of Klonopin to also prevent interference with the treatment  At this time patient has been medically cleared and will have ECT 1  Tomorrow      Current medications:    Current Facility-Administered Medications:     acetaminophen (TYLENOL) tablet 650 mg, 650 mg, Oral, Q4H PRN, Bravo Hernandez PA-C    albuterol (PROVENTIL HFA,VENTOLIN HFA) inhaler 2 puff, 2 puff, Inhalation, Q4H PRN, Kevin Vallejo MD    budesonide-formoterol (SYMBICORT) 160-4 5 mcg/act inhaler 2 puff, 2 puff, Inhalation, BID, Kevin Vallejo MD, 2 puff at 10/30/18 6607    butalbital-acetaminophen-caffeine (FIORICET,ESGIC) -40 mg per tablet 1 tablet, 1 tablet, Oral, Q12H PRN, Edwinderrick Zhao, PA-PAULINA    [START ON 10/31/2018] clonazePAM (KlonoPIN) tablet 1 mg, 1 mg, Oral, Daily, Edwinna Course, PA-C    dicyclomine (BENTYL) capsule 20 mg, 20 mg, Oral, Q6H PRN, Edwinna Course, PA-C    gabapentin (NEURONTIN) capsule 400 mg, 400 mg, Oral, TID, Kevin Vallejo MD, 400 mg at 10/30/18 0822    influenza vaccine, recombinant, quadrivalent (FLUBLOK) IM injection 0 5 mL, 0 5 mL, Intramuscular, Prior to discharge, Wicho Llanes MD    lidocaine (LIDODERM) 5 % patch 1 patch, 1 patch, Topical, Daily, Star Lilly MD Jeffry, 1 patch at 10/30/18 0822    lisinopril (ZESTRIL) tablet 20 mg, 20 mg, Oral, Daily, Jv Chiang MD, 20 mg at 10/30/18 0822    LORazepam (ATIVAN) 2 mg/mL injection 0 5 mg, 0 5 mg, Intramuscular, Q6H PRN, Susana Brown PA-C    LORazepam (ATIVAN) tablet 0 5 mg, 0 5 mg, Oral, Q6H PRN, Susana Brown PA-C, 0 5 mg at 10/29/18 0551    mirtazapine (REMERON) tablet 30 mg, 30 mg, Oral, HS, Ephraim Chua PA-C, 30 mg at 10/29/18 2108    nicotine (NICODERM CQ) 14 mg/24hr TD 24 hr patch 1 patch, 1 patch, Transdermal, Daily, Susana Brown PA-C, 1 patch at 10/30/18 0823    OLANZapine (ZyPREXA) IM injection 5 mg, 5 mg, Intramuscular, Q6H PRN, Susana Brown PA-C    OLANZapine (ZyPREXA) tablet 5 mg, 5 mg, Oral, Q6H PRN, Susana Brown PA-C    ondansetron (ZOFRAN-ODT) dispersible tablet 4 mg, 4 mg, Oral, Q6H PRN, Ephraim Chua PA-C    pantoprazole (PROTONIX) EC tablet 20 mg, 20 mg, Oral, Early Morning, Jv Chiang MD, 20 mg at 10/30/18 0605    pneumococcal 23-valent polysaccharide vaccine (PNEUMOVAX-23) injection 0 5 mL, 0 5 mL, Subcutaneous, Prior to discharge, Eryn Washington MD    propranolol (INDERAL) tablet 10 mg, 10 mg, Oral, BID, Jv Chiang MD, 10 mg at 10/30/18 0821    tiZANidine (ZANAFLEX) tablet 2 mg, 2 mg, Oral, Q8H PRN, Ephraim Chua PA-C    traMADol (ULTRAM) tablet 50 mg, 50 mg, Oral, Q4H PRN, BETHANY Kerns-C    traZODone (DESYREL) tablet 100 mg, 100 mg, Oral, HS, Ephraim Chua PA-C    venlafaxine (EFFEXOR-XR) 24 hr capsule 150 mg, 150 mg, Oral, Daily, Ephraim Chua PA-C, 150 mg at 10/30/18 9629    Current Problem List:    Patient Active Problem List   Diagnosis    Chronic diarrhea    Irritable bowel syndrome with diarrhea    Recent weight loss    Abdominal pain    Psychiatric disorder    Hypertension    Hyperlipidemia    Severe episode of recurrent major depressive disorder, without psychotic features [facility] [address] [facility]    COPD without exacerbation [facility] [address] [facility]    Generalized anxiety disorder    Right knee pain    Anxiety state    Sinus tachycardia    History of rib fracture    Tobacco abuse       Problem list reviewed 10/30/18     Objective:     Vital Signs:  Vitals:    10/29/18 0815 10/29/18 1654 10/30/18 0645 10/30/18 0646   BP: 143/93 122/71 118/80 133/82   BP [place] Left arm Left arm Left arm Left arm   Pulse: (!) 120 97 103 103   Resp:  18 18    Temp:  (!) 97 3 °F (36 3 °C) 98 5 °F (36 9 °C)    TempSrc:  Temporal Temporal    SpO2:       Weight:       Height:             Appearance:  age appropriate, casually dressed and disheveled   Behavior:  normal   Speech:  normal volume   Mood:  anxious and depressed   Affect:  labile   Thought Process:  normal   Thought Content:  normal   Perceptual Disturbances: None   Risk Potential: none   Sensorium:  person, place and time   Cognition:  intact   Consciousness:  alert and awake    Attention: attention span and concentration were age appropriate   Intellect: average   Insight:  limited   Judgment: limited      Motor Activity: no abnormal movements           Recent Labs:  Results Reviewed     Procedure Component Value Units Date/Time    TSH, 3rd generation [[id number]]  (Normal) Collected:  10/28/18 0800    Lab Status:  Final result Specimen:  Blood from Arm, Right Updated:  10/28/18 1502     TSH 3RD GENERATON 1 390 uIU/mL     Narrative: The recommended reference ranges for TSH during pregnancy are as follows:  First trimester 0 1 to 2 5 uIU/mL  Second trimester  0 2 to 3 0 uIU/mL  Third trimester 0 3 to 3 0 uIU/m      Patients undergoing fluorescein dye angiography may retain small amounts of fluorescein in the body for 48-72 hours post procedure  Samples containing fluorescein can produce falsely depressed TSH values  If the patient had this procedure,a specimen should be resubmitted post fluorescein clearance      Rapid drug screen, urine [[id number]]  (Abnormal) Collected:  10/28/18 0858    Lab Status:  Final result Specimen:  Urine from Urine, Catheter Updated:  10/28/18 0921     Amph/Meth UR Negative     Barbiturate Ur Positive (A)     Benzodiazepine Urine Negative     Cocaine Urine Negative     Methadone Urine Negative     Opiate Urine Negative     PCP Ur Negative     THC Urine Positive (A)    Narrative:         Presumptive report  If requested, specimen will be sent to reference lab for confirmation  FOR MEDICAL PURPOSES ONLY  IF CONFIRMATION NEEDED PLEASE CONTACT THE LAB WITHIN 5 DAYS  Drug Screen Cutoff Levels:  AMPHETAMINE/METHAMPHETAMINES  1000 ng/mL  BARBITURATES     200 ng/mL  BENZODIAZEPINES     200 ng/mL  COCAINE      300 ng/mL  METHADONE      300 ng/mL  OPIATES      300 ng/mL  PHENCYCLIDINE     25 ng/mL  THC       50 ng/mL    Basic metabolic panel [[id number]]  (Abnormal) Collected:  10/28/18 0800    Lab Status:  Final result Specimen:  Blood from Arm, Right Updated:  10/28/18 0817     Sodium 137 mmol/L      Potassium 4 6 mmol/L      Chloride 103 mmol/L      CO2 28 mmol/L      ANION GAP 6 mmol/L      BUN 17 mg/dL      Creatinine 0 71 mg/dL      Glucose 122 (H) mg/dL      Calcium 9 4 mg/dL      eGFR 100 ml/min/1 73sq m     Narrative:         National Kidney Disease Education Program recommendations are as follows:  GFR calculation is accurate only with a steady state creatinine  Chronic Kidney disease less than 60 ml/min/1 73 sq  meters  Kidney failure less than 15 ml/min/1 73 sq  meters      Ethanol [[id number]]  (Normal) Collected:  10/28/18 0800    Lab Status:  Final result Specimen:  Blood from Arm, Right Updated:  10/28/18 0817     Ethanol Lvl <10 mg/dL     CBC and differential [[id number]]  (Abnormal) Collected:  10/28/18 0800    Lab Status:  Final result Specimen:  Blood from Arm, Right Updated:  10/28/18 0810     WBC 6 40 Thousand/uL      RBC 3 38 (L) Million/uL      Hemoglobin 12 9 g/dL      Hematocrit 38 2 %       (H) fL      MCH 38 1 (H) pg      MCHC 33 7 g/dL      RDW 15 6 (H) %      MPV 7 5 (L) fL      Platelets 044 Thousands/uL      Neutrophils Relative 70 (H) %      Lymphocytes Relative 24 %      Monocytes Relative 5 %      Eosinophils Relative 1 %      Basophils Relative 1 %      Neutrophils Absolute 4 50 Thousands/µL      Lymphocytes Absolute 1 50 Thousands/µL      Monocytes Absolute 0 30 Thousand/µL      Eosinophils Absolute 0 10 Thousand/µL      Basophils Absolute 0 00 Thousands/µL           I/O Past 24 hours:  No intake/output data recorded  No intake/output data recorded  Assessment / Plan:     Severe episode of recurrent major depressive disorder, without psychotic features ([facility] [address] )    Recommended Treatment:      Medication changes:  1) add trazodone 100 mg at bedtime  Decrease Klonopin 1 mg daily  ECT 1  Tomorrow  Non-pharmacological treatments  1) Continue with group therapy, milieu therapy and occupational therapy  Safety  1) Safety/communication plan established targeting dynamic risk factors above  2) Risks, benefits, and possible side effects of medications explained to patient and patient verbalizes understanding  Counseling / Coordination of Care    Total floor / unit time spent today 20 minutes  Greater than 50% of total time was spent with the patient and / or family counseling and / or coordination of care  A description of the counseling / coordination of care  Patient's Rights, confidentiality and exceptions to confidentiality, use of automated medical record, [facility] staff access to medical record, and consent to treatment reviewed      Max Arauz PA-C

## 2018-10-30 NOTE — PLAN OF CARE
Depression     Treatment Goal: Demonstrate behavioral control of depressive symptoms, verbalize feelings of improved mood/affect, and adopt new coping skills prior to discharge Progressing     Verbalize thoughts and feelings Progressing     Refrain from harming self Progressing     Refrain from isolation Progressing     Refrain from self-neglect Progressing     Attend and participate in unit activities, including therapeutic, recreational, and educational groups Progressing     Complete daily ADLs, including personal hygiene independently, as able Progressing        Electroconvulsive therapy (ECT)     Treatment Goal: Demonstrate a reduction of confusion and improved orientation to person, place, time and/or situation upon discharge, according to optimum baseline/ability Progressing     Verbalizes/displays adequate comfort level or baseline comfort level Progressing     Achieves stable or improved neurological status Progressing     Achieves maximal functionality and self care Progressing     Maintain or return mobility to safest level of function Progressing     Absence of urinary retention Progressing     Minimal or absence of nausea and/or vomiting Progressing     Maintains adequate nutritional intake Progressing        Patient is to start ECT tomorrow  Patient has been visible on the unit  She reports 7-8/10 depression, 2/4 anxiety, denies SI/HI, denies hallucinations  She is med compliant  She requires prn pain medication for her chronic pain  She tends to isolate in her room but is observed interacting with select peers  She presents pleasant and cooperative  Her mood is much brighter and not crying today  Staff will continue to monitor for safety and treatment responses to plan

## 2018-10-30 NOTE — PLAN OF CARE
Problem: DISCHARGE PLANNING  Goal: Discharge to home or other facility with appropriate resources  INTERVENTIONS:  - Identify barriers to discharge w/patient and caregiver  - Arrange for needed discharge resources and transportation as appropriate  - Identify discharge learning needs (meds, outpatient mental health services)  -Deny SI, depression and anxiety  Symptoms will resolve or diminish upon discharge   -Comply with meds, attend 75% of group therapy, identify positive coping skills  - Arrange for interpretive services to assist at discharge as needed  - Refer to Case Management Department for coordinating discharge planning if the patient needs post-hospital services based on physician/advanced practitioner order or complex needs related to functional status, cognitive ability, or social support system   Outcome: Progressing  Worker left voicemail for follow up at [facility]

## 2018-10-30 NOTE — PLAN OF CARE
Problem: DISCHARGE PLANNING  Goal: Discharge to home or other facility with appropriate resources  INTERVENTIONS:  - Identify barriers to discharge w/patient and caregiver  - Arrange for needed discharge resources and transportation as appropriate  - Identify discharge learning needs (meds, outpatient mental health services)  -Deny SI, depression and anxiety  Symptoms will resolve or diminish upon discharge   -Comply with meds, attend 75% of group therapy, identify positive coping skills  - Arrange for interpretive services to assist at discharge as needed  - Refer to Case Management Department for coordinating discharge planning if the patient needs post-hospital services based on physician/advanced practitioner order or complex needs related to functional status, cognitive ability, or social support system   Outcome: Progressing  Pt has potential discharge for tomorrow following ECT 1 if there are no adverse effects  Pt to leave Thursday morning if monitoring is needed  Pt sees private practice therapist whom she will contact for an appointment  Worker richy [facility] for psychiatry  Worker signed JENNY for [facility] and

## 2018-10-30 NOTE — NURSING NOTE
Jeanie Wiseman remains a 10 and will be having her first ECT treatment tomorrow  She stays in the house with her  most of the day  She has no friends or hobbies  Writer suggested when she is feeling better,to look at the yellow pages and see where she can offer her services,just to get out of the house and do something that will make her feel better  Patient requested and was given Ultram 50  Mg po prn for rib pain at 1713  We talked about the possibility of osterpenia and did she ever think of taking OTC calcium,and she said no one ever mentioned that to her

## 2018-10-30 NOTE — ANESTHESIA PREPROCEDURE EVALUATION
Review of Systems/Medical History  Patient summary reviewed  Chart reviewed      Cardiovascular  Hyperlipidemia, Hypertension ,    Pulmonary  COPD ,   Comment: Old healed rib fx right side     GI/Hepatic      Comment: H/O IBS     Negative  ROS        Endo/Other  Negative endo/other ROS      GYN      Comment: Post menopause     Hematology  Negative hematology ROS      Musculoskeletal       Neurology  Negative neurology ROS      Psychology   Anxiety, Depression , bipolar disorder,          Basic metabolic panel   Order: [id number]   Status:  Final result   Visible to patient:  No (Inaccessible in MyChart) Next appt:  10/31/2018 at 12:30 PM in [facility] (1)     Ref Range & Units 10/28/18 0800 5/22/18 0537    Sodium 137 - 147 mmol/L 137  141R     Potassium 3 6 - 5 0 mmol/L 4 6  3 6R     Chloride 97 - 108 mmol/L 103  106R     CO2 22 - 30 mmol/L 28  29R     ANION GAP 5 - 14 mmol/L 6  6R     BUN 5 - 25 mg/dL 17  3      Creatinine 0 60 - 1 20 mg/dL 0 71  0 67R, CM    Comment: Standardized to IDMS reference method    Glucose 70 - 99 mg/dL 122   101R, CM    Comment:    If the patient is fasting, the ADA then defines impaired fasting glucose as > 100 mg/dL and diabetes as > or equal to 123 mg/dL  Specimen collection should occur prior to Sulfasalazine administration due to the potential for falsely depressed results  Specimen collection should occur prior to Sulfapyridine administration due to the potential for falsely elevated results  Calcium 8 4 - 10 2 mg/dL 9 4  9 1R     eGFR >60 ml/min/1 73sq m 100  104R    Narrative       National Kidney Disease Education Program recommendations are as follows:  GFR calculation is accurate only with a steady state creatinine  Chronic Kidney disease less than 60 ml/min/1 73 sq  meters  Kidney failure less than 15 ml/min/1 73 sq  meters        Specimen Collected: 10/28/18 08:00 Last Resulted: 10/28/18 08:17              CM=Additional comments  R=Reference range differs from displayed range                    Physical Exam    Airway    Mallampati score: II  TM Distance: >3 FB  Neck ROM: full     Dental       Cardiovascular  Cardiovascular exam normal    Pulmonary  Pulmonary exam normal     Other Findings        Anesthesia Plan  ASA Score- 2     Anesthesia Type- general with ASA Monitors  Additional Monitors:   Airway Plan:         Plan Factors-    Induction- intravenous  Postoperative Plan-     Informed Consent- Anesthetic plan and risks discussed with patient  I personally reviewed this patient with the CRNA  Discussed and agreed on the Anesthesia Plan with the CRNA  Ania Slaughter

## 2018-10-31 ENCOUNTER — ANESTHESIA (INPATIENT)
Dept: PREOP/PACU | Facility: HOSPITAL | Age: 48
DRG: 753 | End: 2018-10-31
Payer: COMMERCIAL

## 2018-10-31 ENCOUNTER — APPOINTMENT (INPATIENT)
Dept: PREOP/PACU | Facility: HOSPITAL | Age: 48
DRG: 753 | End: 2018-10-31
Payer: COMMERCIAL

## 2018-10-31 VITALS
OXYGEN SATURATION: 95 % | BODY MASS INDEX: 28.82 KG/M2 | TEMPERATURE: 98.7 F | HEIGHT: 65 IN | DIASTOLIC BLOOD PRESSURE: 81 MMHG | WEIGHT: 173 LBS | HEART RATE: 83 BPM | SYSTOLIC BLOOD PRESSURE: 107 MMHG | RESPIRATION RATE: 18 BRPM

## 2018-10-31 PROCEDURE — GZB0ZZZ ELECTROCONVULSIVE THERAPY, UNILATERAL-SINGLE SEIZURE: ICD-10-PCS | Performed by: PSYCHIATRY & NEUROLOGY

## 2018-10-31 PROCEDURE — 90732 PPSV23 VACC 2 YRS+ SUBQ/IM: CPT | Performed by: PSYCHIATRY & NEUROLOGY

## 2018-10-31 PROCEDURE — 90682 RIV4 VACC RECOMBINANT DNA IM: CPT | Performed by: PSYCHIATRY & NEUROLOGY

## 2018-10-31 PROCEDURE — 90870 ELECTROCONVULSIVE THERAPY: CPT

## 2018-10-31 RX ORDER — PROPRANOLOL HYDROCHLORIDE 10 MG/1
10 TABLET ORAL 2 TIMES DAILY
Qty: 30 TABLET | Refills: 1 | Status: SHIPPED | OUTPATIENT
Start: 2018-10-31 | End: 2019-05-20

## 2018-10-31 RX ORDER — PANTOPRAZOLE SODIUM 20 MG/1
20 TABLET, DELAYED RELEASE ORAL
Qty: 15 TABLET | Refills: 1 | Status: SHIPPED | OUTPATIENT
Start: 2018-10-31 | End: 2019-05-20

## 2018-10-31 RX ORDER — GABAPENTIN 400 MG/1
400 CAPSULE ORAL 3 TIMES DAILY
Qty: 45 CAPSULE | Refills: 1 | Status: SHIPPED | OUTPATIENT
Start: 2018-10-31 | End: 2020-02-13 | Stop reason: SDUPTHER

## 2018-10-31 RX ORDER — SUCCINYLCHOLINE CHLORIDE 20 MG/ML
INJECTION INTRAMUSCULAR; INTRAVENOUS AS NEEDED
Status: DISCONTINUED | OUTPATIENT
Start: 2018-10-31 | End: 2018-10-31 | Stop reason: SURG

## 2018-10-31 RX ORDER — SODIUM CHLORIDE 9 MG/ML
125 INJECTION, SOLUTION INTRAVENOUS CONTINUOUS
Status: DISCONTINUED | OUTPATIENT
Start: 2018-11-01 | End: 2018-10-31 | Stop reason: HOSPADM

## 2018-10-31 RX ORDER — SODIUM CHLORIDE 9 MG/ML
125 INJECTION, SOLUTION INTRAVENOUS CONTINUOUS
Status: DISCONTINUED | OUTPATIENT
Start: 2018-10-31 | End: 2018-10-31 | Stop reason: HOSPADM

## 2018-10-31 RX ORDER — CLONAZEPAM 1 MG/1
1 TABLET ORAL DAILY
Qty: 10 TABLET | Refills: 0 | Status: SHIPPED | OUTPATIENT
Start: 2018-10-31 | End: 2019-05-20

## 2018-10-31 RX ORDER — ESMOLOL HYDROCHLORIDE 10 MG/ML
INJECTION INTRAVENOUS AS NEEDED
Status: DISCONTINUED | OUTPATIENT
Start: 2018-10-31 | End: 2018-10-31 | Stop reason: SURG

## 2018-10-31 RX ORDER — BUTALBITAL, ACETAMINOPHEN AND CAFFEINE 50; 325; 40 MG/1; MG/1; MG/1
1 TABLET ORAL ONCE
Status: COMPLETED | OUTPATIENT
Start: 2018-10-31 | End: 2018-10-31

## 2018-10-31 RX ORDER — FENTANYL CITRATE/PF 50 MCG/ML
25 SYRINGE (ML) INJECTION
Status: COMPLETED | OUTPATIENT
Start: 2018-10-31 | End: 2018-10-31

## 2018-10-31 RX ORDER — MIRTAZAPINE 30 MG/1
30 TABLET, FILM COATED ORAL
Qty: 15 TABLET | Refills: 1 | Status: SHIPPED | OUTPATIENT
Start: 2018-10-31 | End: 2019-06-17 | Stop reason: SDUPTHER

## 2018-10-31 RX ORDER — TRAMADOL HYDROCHLORIDE 50 MG/1
50 TABLET ORAL 2 TIMES DAILY PRN
Qty: 30 TABLET | Refills: 1 | Status: SHIPPED | OUTPATIENT
Start: 2018-10-31 | End: 2018-11-10

## 2018-10-31 RX ORDER — VENLAFAXINE HYDROCHLORIDE 150 MG/1
150 CAPSULE, EXTENDED RELEASE ORAL DAILY
Qty: 15 CAPSULE | Refills: 1 | Status: SHIPPED | OUTPATIENT
Start: 2018-10-31 | End: 2019-09-24 | Stop reason: SDUPTHER

## 2018-10-31 RX ORDER — LISINOPRIL 20 MG/1
20 TABLET ORAL DAILY
Qty: 15 TABLET | Refills: 1 | Status: SHIPPED | OUTPATIENT
Start: 2018-10-31 | End: 2019-05-20 | Stop reason: SDUPTHER

## 2018-10-31 RX ADMIN — ESMOLOL HYDROCHLORIDE 20 MG: 10 INJECTION INTRAVENOUS at 06:24

## 2018-10-31 RX ADMIN — VENLAFAXINE HYDROCHLORIDE 150 MG: 150 CAPSULE, EXTENDED RELEASE ORAL at 08:31

## 2018-10-31 RX ADMIN — CLONAZEPAM 1 MG: 1 TABLET ORAL at 08:25

## 2018-10-31 RX ADMIN — SODIUM CHLORIDE 125 ML/HR: 9 INJECTION, SOLUTION INTRAVENOUS at 05:51

## 2018-10-31 RX ADMIN — PROPRANOLOL HYDROCHLORIDE 10 MG: 10 TABLET ORAL at 08:30

## 2018-10-31 RX ADMIN — Medication 100 MG: at 06:18

## 2018-10-31 RX ADMIN — GABAPENTIN 400 MG: 400 CAPSULE ORAL at 08:25

## 2018-10-31 RX ADMIN — FENTANYL CITRATE 25 MCG: 50 INJECTION INTRAMUSCULAR; INTRAVENOUS at 06:52

## 2018-10-31 RX ADMIN — INFLUENZA A VIRUS A/MICHIGAN/45/2015 (H1N1) RECOMBINANT HEMAGGLUTININ ANTIGEN, INFLUENZA A VIRUS A/SINGAPORE/INFIMH-16-0019/2016 (H3N2) RECOMBINANT HEMAGGLUTININ ANTIGEN, INFLUENZA B VIRUS B/MARYLAND/15/2016 RECOMBINANT HEMAGGLUTININ ANTIGEN, AND INFLUENZA B VIRUS B/PHUKET/3073/2013 RECOMBINANT HEMAGGLUTININ ANTIGEN 0.5 ML: 45; 45; 45; 45 INJECTION INTRAMUSCULAR at 11:07

## 2018-10-31 RX ADMIN — FENTANYL CITRATE 25 MCG: 50 INJECTION INTRAMUSCULAR; INTRAVENOUS at 06:46

## 2018-10-31 RX ADMIN — ESMOLOL HYDROCHLORIDE 30 MG: 10 INJECTION INTRAVENOUS at 06:18

## 2018-10-31 RX ADMIN — PNEUMOCOCCAL VACCINE POLYVALENT 0.5 ML
25; 25; 25; 25; 25; 25; 25; 25; 25; 25; 25; 25; 25; 25; 25; 25; 25; 25; 25; 25; 25; 25; 25 INJECTION, SOLUTION INTRAMUSCULAR; SUBCUTANEOUS at 11:08

## 2018-10-31 RX ADMIN — NICOTINE 1 PATCH: 14 PATCH, EXTENDED RELEASE TRANSDERMAL at 08:32

## 2018-10-31 RX ADMIN — PANTOPRAZOLE SODIUM 20 MG: 20 TABLET, DELAYED RELEASE ORAL at 08:25

## 2018-10-31 RX ADMIN — SODIUM CHLORIDE: 0.9 INJECTION, SOLUTION INTRAVENOUS at 05:50

## 2018-10-31 RX ADMIN — FENTANYL CITRATE 25 MCG: 50 INJECTION INTRAMUSCULAR; INTRAVENOUS at 06:41

## 2018-10-31 RX ADMIN — SUCCINYLCHOLINE CHLORIDE 100 MG: 20 INJECTION, SOLUTION INTRAMUSCULAR; INTRAVENOUS at 06:18

## 2018-10-31 RX ADMIN — TRAMADOL HYDROCHLORIDE 50 MG: 50 TABLET, COATED ORAL at 08:25

## 2018-10-31 RX ADMIN — BUDESONIDE AND FORMOTEROL FUMARATE DIHYDRATE 2 PUFF: 160; 4.5 AEROSOL RESPIRATORY (INHALATION) at 08:33

## 2018-10-31 RX ADMIN — LISINOPRIL 20 MG: 20 TABLET ORAL at 08:25

## 2018-10-31 RX ADMIN — LIDOCAINE 1 PATCH: 50 PATCH CUTANEOUS at 08:32

## 2018-10-31 RX ADMIN — BUTALBITAL, ACETAMINOPHEN, AND CAFFEINE 1 TABLET: 50; 325; 40 TABLET ORAL at 07:40

## 2018-10-31 RX ADMIN — FENTANYL CITRATE 25 MCG: 50 INJECTION INTRAMUSCULAR; INTRAVENOUS at 06:57

## 2018-10-31 NOTE — NURSING NOTE
Patient was discharged to home transported by her mother  Patient was escorted to lobby by staff member  Patient was able to verbalize understanding of DC instructions including outpatient ECT treatment dates  Patient denied SI/HI  Belonging were reconciled  She left the unit at 1300

## 2018-10-31 NOTE — PLAN OF CARE
Problem: Depression  Goal: Treatment Goal: Demonstrate behavioral control of depressive symptoms, verbalize feelings of improved mood/affect, and adopt new coping skills prior to discharge  Outcome: Progressing    Goal: Verbalize thoughts and feelings  Interventions:  - Assess and re-assess patient's level of risk   - Engage patient in 1:1 interactions, daily, for a minimum of 15 minutes   - Encourage patient to express feelings, fears, frustrations, hopes    Outcome: Progressing    Goal: Refrain from harming self  Interventions:  - Monitor patient closely, per order   - Supervise medication ingestion, monitor effects and side effects    Outcome: Progressing    Goal: Refrain from isolation  Interventions:  - Develop a trusting relationship   - Encourage socialization    Outcome: Progressing    Goal: Refrain from self-neglect  Outcome: Progressing    Goal: Attend and participate in unit activities, including therapeutic, recreational, and educational groups  Interventions:  - Provide therapeutic and educational activities daily, encourage attendance and participation, and document same in the medical record    Outcome: Progressing    Goal: Complete daily ADLs, including personal hygiene independently, as able  Interventions:  - Observe, teach, and assist patient with ADLS  -  Monitor and promote a balance of rest/activity, with adequate nutrition and elimination    Outcome: Progressing      Problem: Electroconvulsive therapy (ECT)  Goal: Treatment Goal: Demonstrate a reduction of confusion and improved orientation to person, place, time and/or situation upon discharge, according to optimum baseline/ability  Outcome: Progressing      Comments: Patient has engaged in no self harm and has been cooperative with treatment

## 2018-10-31 NOTE — PLAN OF CARE
Problem: DISCHARGE PLANNING  Goal: Discharge to home or other facility with appropriate resources  INTERVENTIONS:  - Identify barriers to discharge w/patient and caregiver  - Arrange for needed discharge resources and transportation as appropriate  - Identify discharge learning needs (meds, outpatient mental health services)  -Deny SI, depression and anxiety  Symptoms will resolve or diminish upon discharge   -Comply with meds, attend 75% of group therapy, identify positive coping skills  - Arrange for interpretive services to assist at discharge as needed  - Refer to Case Management Department for coordinating discharge planning if the patient needs post-hospital services based on physician/advanced practitioner order or complex needs related to functional status, cognitive ability, or social support system   Outcome: Completed Date Met: 10/31/18  Pt to be discharged today  Pt denies SI/HI, AH/VH  Pt oriented x3  Pt to return home  Pt's  will provide transport  Pt had ECT 1 today and to follow up outpatient  Pt to follow up with [facility]  Pt's meds filled at [facility]

## 2018-10-31 NOTE — NURSING NOTE
Patient appeared to sleep through the night without interruption  Patient currently off unit for ECT#1

## 2018-10-31 NOTE — ANESTHESIA POSTPROCEDURE EVALUATION
Post-Op Assessment Note      CV Status:  Stable    Mental Status:  Awake and alert    Hydration Status:  Stable    PONV Controlled:  None    Airway Patency:  Patent    Post Op Vitals Reviewed: Yes          Staff: CRNA           BP   149/71   Temp      Pulse  101   Resp   13   SpO2   97% 2L nc

## 2018-10-31 NOTE — NURSING NOTE
Received patient at 1900  Patient was cooperative and visible on unit during the evening  Patient was pleasant with a blunted affect on approach  Patient requested to use disposable razor to shave her face  A staff member supervised patient while using the razor  Patient reported 7/10 pain in her ribs and was administered PRN Ultram 50 mg at 2132  Patient has appeared to be sleeping for the past few hours  Patient NPO after 2200 for ECT #1  Will continue to monitor closely

## 2018-10-31 NOTE — PROGRESS NOTES
Patient is med and meal compliant  She received her first  ECT today Patient wants to do her ECT as a out patient    Patient will be discharged today

## 2018-10-31 NOTE — PROCEDURES
ECT Procedure    Patient Name:  Matthew Farmer   Medical Record Number: [id number]   YOB: 1970  Date of Procedure: 10/31/2018    Time out was taken with staff to confirm correct patient and correct procedure to be performed      Diagnosis: Severe episode of recurrent major depressive disorder, without psychotic features ([facility] )    Treatment Number: 1    ECT Type: Inpatient    Electrode Placement: right unilateral    Post Ictal Suppression Index:  NA    % Energy Set: 40    Seizure Duration (OBS): 12 sec    Additional Notes: increase energy to 65%    Sanchomehrdad Patterson MD

## 2018-10-31 NOTE — DISCHARGE INSTRUCTIONS
Depression   WHAT YOU NEED TO KNOW:   What is depression? Depression is a medical condition that causes feelings of sadness or hopelessness that do not go away  Depression may cause you to lose interest in things you used to enjoy  These feelings may interfere with your daily life  What causes or increases my risk for depression? Depression may be caused by changes in brain chemicals that affect your mood  Your risk for depression may be higher if you have any of the following:  · Stressful events such as the death of a loved one, unemployment, childhood trauma, divorce, or domestic abuse    · A chronic medical condition such as diabetes, heart disease, or cancer    · Parents, siblings, or other family members with a history of depression    · Drug or alcohol abuse  What are the signs and symptoms of depression? · Appetite changes, or weight gain or loss    · Trouble going to sleep or staying asleep, or sleeping too much    · Fatigue or lack of energy    · Feeling restless, irritable, or withdrawn    · Feeling worthless, hopeless, discouraged, or guilty    · Trouble concentrating, remembering things, doing daily tasks, or making decisions    · Thoughts about hurting or killing yourself  How is depression diagnosed? Your healthcare provider will ask about your symptoms and how long you have had them  He or she will ask if you have any family members with depression  Tell your healthcare provider about any stressful events in your life  He or she may ask about any other health conditions or medicines you take  How is depression treated? · Therapy  may be used to treat your depression  A therapist will help you learn to cope with your thoughts and feelings  This can be done alone or in a group  It may also be done with family members or a significant other  · Antidepressant medicine  may be given to improve or balance your mood   You may need to take this medicine for several weeks before you begin to feel better  Tell your healthcare provider about any side effects or problems you have with your medicine  The type or amount of medicine may need to be changed  How can I manage depression? · Get regular physical activity  Try to exercise for 30 minutes, 3 to 5 days a week  Work with your healthcare provider to develop an exercise plan that you enjoy  Physical activity may improve your symptoms  · Get enough sleep  Create a routine to help you relax before bed  You can listen to music, read, or do yoga  Try to go to bed and wake up at the same time every day  Sleep is important for emotional health  · Eat a variety of healthy foods from all of the food groups  A healthy meal plan is low in fat, salt, and added sugar  Ask your healthcare provider for more information about a meal plan that is right for you  · Do not drink alcohol or use drugs  Alcohol and drugs can make your symptoms worse  Call 911 for any of the following:   · You think about harming yourself or someone else  When should I contact my healthcare provider? · Your symptoms do not improve  · You cannot make it to your next appointment  · You have new symptoms  · You have questions or concerns about your condition or care  CARE AGREEMENT:   You have the right to help plan your care  Learn about your health condition and how it may be treated  Discuss treatment options with your caregivers to decide what care you want to receive  You always have the right to refuse treatment  The above information is an  only  It is not intended as medical advice for individual conditions or treatments  Talk to your doctor, nurse or pharmacist before following any medical regimen to see if it is safe and effective for you  © 2017 [employer] Information is for End User's use only and may not be sold, redistributed or otherwise used for commercial purposes   All illustrations and images included in [facility] are the copyrighted property of [employer]  or Alonso Garcia  Electroconvulsive Therapy   WHAT YOU NEED TO KNOW:   Electroconvulsive therapy (ECT) is a treatment that sends a small electric current to your brain to cause a seizure  The seizure affects the chemicals in your brain, which may make your brain cells work better  ECT is used to treat certain conditions, such as depression, that do not get better after medicines or other therapies have been tried  HOW TO PREPARE:   The week before your treatment:   · Ask a family member or friend to drive you home after your treatment  Do not drive yourself home  · Ask your caregiver if you need to stop using aspirin or any other prescribed or over-the-counter medicine before your procedure or surgery  · You may need to have blood tests, x-rays, an ECG, or other tests  Brain imaging tests, such as a CT scan or MRI may also be done  Ask your healthcare provider for more information about these and other tests that you may need  Write down the date, time, and location of each test      · Write down the correct date, time, and location of your procedure  Ask caregivers about directions for eating and drinking  · You or a close family member will be asked to sign a legal document called a consent form  It gives caregivers permission to do the procedure or surgery  It also explains the problems that may happen, and your choices  Make sure all your questions are answered before you sign this form  The night before your treatment:   · You may be given medicine to help you sleep  · You may need to fast overnight  This means you are not allowed to eat any solid food after midnight  You may drink some water to take medicine up to 1 hour before your treatment  The day of your treatment:   · An anesthesiologist will talk to you before your surgery  You may need medicine to keep you asleep or numb an area of your body during surgery  Tell caregivers if you or anyone in your family has had a problem with anesthesia in the past     · You may be given medicine to help prevent side effects caused by ECT, such as headaches  · Caregivers may insert an intravenous tube (IV) into your vein  A vein in the arm is usually chosen  Through the IV tube, you may be given liquids and medicine  WHAT WILL HAPPEN:   What will happen: You will be given medicines to calm you, make you sleepy, or relax your muscles  Medicine called general anesthesia will also be given to keep you asleep during the treatment  When you are asleep, electrodes will be placed on your head and a small electric current will be sent to your brain  You will wake up 5 to 10 minutes after the treatment  After your treatment:  You will be taken to a room where you can rest  Healthcare providers will watch you closely for any problems  When they see that you are okay, you may be allowed to go home  If they want you to stay in the hospital, you will be taken back to your hospital room  Do not get out of bed until your healthcare provider says it is okay  CONTACT YOUR HEALTHCARE PROVIDER IF:   · You cannot make it to your treatment on time  · Your skin is itchy, swollen, or has a rash  SEEK CARE IMMEDIATELY IF:   · You have a fever  · You have shortness of breath or your heart feels like it is beating faster than normal     · You feel like hurting or killing yourself or others  RISKS:   · Common problems after ECT include confusion and trouble remembering things  Memory often gets better 2 weeks after the treatment, but some memories may be forgotten forever  You may have nausea, vomiting, a headache, and muscle aches  Your blood pressure may increase, or the rhythm of your heartbeat may change  ECT may not help to treat your condition, or you may need more sessions before you feel better  Your problems or symptoms may come back after ECT treatments      · Certain medical problems can increase your risk of problems after ECT treatments  These medical problems include a stroke, heart attack, or increased pressure in your brain caused by a tumor or blockage  Other medical problems include certain kidney tumors or a high-risk pregnancy  CARE AGREEMENT:   You have the right to help plan your care  Learn about your health condition and how it may be treated  Discuss treatment options with your caregivers to decide what care you want to receive  You always have the right to refuse treatment  © 2017 [facility] [facility] Information is for End User's use only and may not be sold, redistributed or otherwise used for commercial purposes  All illustrations and images included in CareNotes® are the copyrighted property of [employer]  or Alonso Garcia  The above information is an  only  It is not intended as medical advice for individual conditions or treatments  Talk to your doctor, nurse or pharmacist before following any medical regimen to see if it is safe and effective for you

## 2018-10-31 NOTE — DISCHARGE SUMMARY
Psychiatric Discharge Summary    Medical Record Number: [id number]  Encounter: [id number]    Discharge diagnosis:  Severe episode of recurrent major depressive disorder, without psychotic features ([facility] )    Secondary diagnoses:  Problem List     * (Principal)Severe episode of recurrent major depressive disorder, without psychotic features ([facility] )    Chronic diarrhea    Irritable bowel syndrome with diarrhea    Recent weight loss    Overview Signed 3/9/2018  3:20 PM by Josette Rodriges MA     Added automatically from request for surgery [id number]         Abdominal pain    Overview Signed 3/9/2018  3:20 PM by Josette Rodriges MA     Added automatically from request for surgery [id number]         Psychiatric disorder    Hypertension    Hyperlipidemia    COPD without exacerbation ([facility] )    Generalized anxiety disorder    Right knee pain    Anxiety state    Sinus tachycardia    History of rib fracture    Tobacco abuse          HPI:     Patient is a 52-year-old female with history major depressive disorder who was admitted to the [address] [place] [address] on a 201 status due to increased depressive symptoms and lack of functioning  Patient been isolative to her home and not getting out of bed  Patient was with poor sleep and poor appetite  Patient was having lack of improvement in her symptoms despite multiple medication trials  As result electroconvulsive therapy was discussed as a potential treatment option  Patient was then transferred to the emergency department and deemed medically stable and transferred for inpatient psychiatric treatment  Brief Hospital Course: Following admission to the unit patient has Klonopin dosing was down titrated 1 mg daily to avoid interaction with electroconvulsive therapy  Patient's Ambien was also discontinued for the same reason  Patient was continued on her Neurontin, Inderal, and Effexor dosing  Patient was expressing that she did not feel safe on the unit due to the acuity    Patient was expressing that due to not having any suicidality she would like to transfer her ECT to be on an outpatient basis  Patient did sign a 72 hour notice during her hospitalization  Patient and attending psychiatrist were in agreement with having her 1st ECT treatment in the hospital to monitor for tolerance  Patient did have a headache following the treatment however does have a history of migraines  Patient was provided p r n  medication which was effective in alleviating her headache  Arrangements were made to continue the remainder of her ECT on an outpatient basis  On patient was with no suicidality throughout her hospitalization  Patient was with no homicidal ideations or psychosis  October 31, 2018 patient was evaluated and deemed appropriate for discharge on this date to continue her care on an outpatient basis  Patient was educated on ECT and her medications and continued to consent to treatment  Patient was assessed at the time of discharge was seemed to be a low suicide risk  Patient consented that if she had any exacerbation of her symptoms she would merely contact Emergency Services or return back to emergency department      Condition on discharge:     Improved    Medications Upon Discharge:       Current Facility-Administered Medications:     clonazePAM (KlonoPIN) tablet 1 mg, 1 mg, Oral, Daily, Anju Maguire PA-C, 1 mg at 10/31/18 0825    gabapentin (NEURONTIN) capsule 400 mg, 400 mg, Oral, TID, Scott Jasso MD, 400 mg at 10/31/18 0825    propranolol (INDERAL) tablet 10 mg, 10 mg, Oral, BID, Scott Jasso MD, 10 mg at 10/31/18 0830    traZODone (DESYREL) tablet 100 mg, 100 mg, Oral, HS, Anju Maguire PA-C, 100 mg at 10/30/18 2128    venlafaxine (EFFEXOR-XR) 24 hr capsule 150 mg, 150 mg, Oral, Daily, Anju Maguire PA-C, 150 mg at 10/31/18 0831    Anju Maguire PA-C

## 2018-11-01 ENCOUNTER — ANESTHESIA EVENT (OUTPATIENT)
Dept: PREOP/PACU | Facility: HOSPITAL | Age: 48
End: 2018-11-01

## 2018-11-02 ENCOUNTER — ANESTHESIA EVENT (OUTPATIENT)
Dept: PREOP/PACU | Facility: HOSPITAL | Age: 48
End: 2018-11-02

## 2018-11-02 ENCOUNTER — HOSPITAL ENCOUNTER (OUTPATIENT)
Dept: PREOP/PACU | Facility: HOSPITAL | Age: 48
Setting detail: OUTPATIENT SURGERY
Discharge: HOME | End: 2018-11-02
Payer: COMMERCIAL

## 2018-11-02 ENCOUNTER — ANESTHESIA (OUTPATIENT)
Dept: PREOP/PACU | Facility: HOSPITAL | Age: 48
End: 2018-11-02

## 2018-11-02 VITALS
HEIGHT: 65 IN | RESPIRATION RATE: 18 BRPM | HEART RATE: 98 BPM | SYSTOLIC BLOOD PRESSURE: 137 MMHG | WEIGHT: 173 LBS | DIASTOLIC BLOOD PRESSURE: 80 MMHG | BODY MASS INDEX: 28.82 KG/M2 | OXYGEN SATURATION: 98 % | TEMPERATURE: 98 F

## 2018-11-02 PROCEDURE — 90870 ELECTROCONVULSIVE THERAPY: CPT

## 2018-11-02 RX ORDER — KETOROLAC TROMETHAMINE 30 MG/ML
INJECTION, SOLUTION INTRAMUSCULAR; INTRAVENOUS AS NEEDED
Status: DISCONTINUED | OUTPATIENT
Start: 2018-11-02 | End: 2018-11-02 | Stop reason: SURG

## 2018-11-02 RX ORDER — ESMOLOL HYDROCHLORIDE 10 MG/ML
INJECTION INTRAVENOUS AS NEEDED
Status: DISCONTINUED | OUTPATIENT
Start: 2018-11-02 | End: 2018-11-02 | Stop reason: SURG

## 2018-11-02 RX ORDER — SODIUM CHLORIDE 9 MG/ML
50 INJECTION, SOLUTION INTRAVENOUS CONTINUOUS
Status: DISCONTINUED | OUTPATIENT
Start: 2018-11-03 | End: 2018-11-05

## 2018-11-02 RX ORDER — SUCCINYLCHOLINE CHLORIDE 20 MG/ML
INJECTION INTRAMUSCULAR; INTRAVENOUS AS NEEDED
Status: DISCONTINUED | OUTPATIENT
Start: 2018-11-02 | End: 2018-11-02 | Stop reason: SURG

## 2018-11-02 RX ADMIN — SODIUM CHLORIDE 50 ML/HR: 9 INJECTION, SOLUTION INTRAVENOUS at 05:45

## 2018-11-02 RX ADMIN — ESMOLOL HYDROCHLORIDE 50 MG: 10 INJECTION INTRAVENOUS at 07:13

## 2018-11-02 RX ADMIN — KETOROLAC TROMETHAMINE 30 MG: 30 INJECTION, SOLUTION INTRAMUSCULAR; INTRAVENOUS at 07:10

## 2018-11-02 RX ADMIN — Medication 100 MG: at 07:14

## 2018-11-02 RX ADMIN — SODIUM CHLORIDE: 9 INJECTION, SOLUTION INTRAVENOUS at 07:05

## 2018-11-02 RX ADMIN — SUCCINYLCHOLINE CHLORIDE 100 MG: 20 INJECTION, SOLUTION INTRAMUSCULAR; INTRAVENOUS at 07:14

## 2018-11-02 NOTE — NURSING NOTE
Pt states she feels much better Tolerated diet  Written and verbal instructions given pt verbalizes an understanding and has no questions or complaints

## 2018-11-02 NOTE — ANESTHESIA PREPROCEDURE EVALUATION
Pt had  ECTs previously without anesthesia complications  H & P Reviewed, no interval change  Anesthesia Plan  ASA Score- 2     Anesthesia Type- general with ASA Monitors  Additional Monitors:   Airway Plan:         Plan Factors-    Induction- intravenous  Postoperative Plan-     Informed Consent- Anesthetic plan and risks discussed with patient  I personally reviewed this patient with the CRNA  Discussed and agreed on the Anesthesia Plan with the CRNA  Rebecca Smith

## 2018-11-02 NOTE — NURSING NOTE
Pt returned to [facility] awake alert nahomy, states she has a headache 8/10  Pt received Toradol in PACU  Taking PO, IV infusing, lights dimmed

## 2018-11-02 NOTE — DISCHARGE INSTRUCTIONS
Electroconvulsive Therapy   WHAT YOU NEED TO KNOW:   Electroconvulsive therapy (ECT) is a treatment that sends a small electric current to your brain to cause a seizure  The seizure affects the chemicals in your brain, which may make your brain cells work better  ECT is used to treat certain conditions, such as depression, that do not get better after medicines or other therapies have been tried  DISCHARGE INSTRUCTIONS:   Medicines:   · Antidepressants: You may be given this medicine to help decrease or prevent symptoms of depression  · Take your medicine as directed  Contact your healthcare provider if you think your medicine is not helping or if you have side effects  Tell him of her if you are allergic to any medicine  Keep a list of the medicines, vitamins, and herbs you take  Include the amounts, and when and why you take them  Bring the list or the pill bottles to follow-up visits  Carry your medicine list with you in case of an emergency  Follow up with your healthcare provider as directed:  Write down your questions so you remember to ask them during your visits  Memory loss and headaches:   · ECT may cause memory loss and confusion  Your confusion may go away in a short time, such as 1 hour after your treatment  You may lose your memory for 1 to 3 weeks, and some memories may be lost forever  · You may also get a headache after an ECT treatment  These headaches usually only last a short time  If you have a headache after ECT, ask your healthcare provider for medicine to make it go away  If more ECT treatments are planned for you, ask the healthcare provider to give you medicine before the treatments to help prevent headaches  · There is a greater chance that you will fall after ECT treatments  Ask someone to help you when you want to stand up or walk  Ask your healthcare provider for more information on how to prevent falls    Contact your healthcare provider if:   · You have severe pain in your back or neck  · You have questions or concerns about your condition or care  Return to the emergency department if:   · You have a fever  · You have a severe headache that does not get better, even after you take medicine to treat it  · You have a stiff neck or trouble thinking clearly  · You have feelings of guilt or hopelessness, or thoughts of hurting or killing yourself or others  · You have shortness of breath, chest pain, or a fast heartbeat  © 2017 [address] [employer] Information is for End User's use only and may not be sold, redistributed or otherwise used for commercial purposes  All illustrations and images included in CareNotes® are the copyrighted property of [employer]  or Alonso Garcia  The above information is an  only  It is not intended as medical advice for individual conditions or treatments  Talk to your doctor, nurse or pharmacist before following any medical regimen to see if it is safe and effective for you

## 2018-11-02 NOTE — ANESTHESIA PREPROCEDURE EVALUATION
Review of Systems/Medical History  Patient summary reviewed  Chart reviewed  No history of anesthetic complications     Cardiovascular  Exercise tolerance (METS): >4,  Hyperlipidemia, Hypertension controlled,    Pulmonary  Smoker Cumulative Pack Years: 27, COPD (chr bronchitis) ,   Comment: Old healed rib fx right side     GI/Hepatic      Comment: H/O IBS     Negative  ROS        Endo/Other  Negative endo/other ROS      GYN      Comment: Post menopause     Hematology  Negative hematology ROS      Musculoskeletal       Neurology  Negative neurology ROS      Psychology   Anxiety, Depression , bipolar disorder,   Chronic pain (on tramadol),        Basic metabolic panel   Order: [id number]   Status:  Final result   Visible to patient:  No (Inaccessible in MyChart) Next appt:  10/31/2018 at 12:30 PM in [facility])     Ref Range & Units 10/28/18 0800 5/22/18 0537    Sodium 137 - 147 mmol/L 137  141R     Potassium 3 6 - 5 0 mmol/L 4 6  3 6R     Chloride 97 - 108 mmol/L 103  106R     CO2 22 - 30 mmol/L 28  29R     ANION GAP 5 - 14 mmol/L 6  6R     BUN 5 - 25 mg/dL 17  3      Creatinine 0 60 - 1 20 mg/dL 0 71  0 67R, CM    Comment: Standardized to IDMS reference method    Glucose 70 - 99 mg/dL 122   101R, CM    Comment:    If the patient is fasting, the ADA then defines impaired fasting glucose as > 100 mg/dL and diabetes as > or equal to 123 mg/dL  Specimen collection should occur prior to Sulfasalazine administration due to the potential for falsely depressed results  Specimen collection should occur prior to Sulfapyridine administration due to the potential for falsely elevated results      Calcium 8 4 - 10 2 mg/dL 9 4  9 1R     eGFR >60 ml/min/1 73sq m 100  104R    Narrative       National Kidney Disease Education Program recommendations are as follows:  GFR calculation is accurate only with a steady state creatinine  Chronic Kidney disease less than 60 ml/min/1 73 sq  meters  Kidney failure less than 15 ml/min/1 73 sq  meters  Specimen Collected: 10/28/18 08:00 Last Resulted: 10/28/18 08:17              CM=Additional comments  R=Reference range differs from displayed range                EKG NSR    Physical Exam    Airway    Mallampati score: II  TM Distance: >3 FB  Neck ROM: full     Dental       Cardiovascular  Cardiovascular exam normal    Pulmonary  Pulmonary exam normal     Other Findings        Anesthesia Plan  ASA Score- 2     Anesthesia Type- general with ASA Monitors  Additional Monitors:   Airway Plan:     Comment: Pt tolerated anesthesia well for last ECT  No new changes  Pt c/o body pain ( myalgia)  following last ect  Plan Factors-    Induction- intravenous  Postoperative Plan-     Informed Consent- Anesthetic plan and risks discussed with patient  I personally reviewed this patient with the CRNA  Discussed and agreed on the Anesthesia Plan with the CRNA  Rosendo Dennison reviewed  No new changes  Recent lab reviewed   Plan discussed

## 2018-11-02 NOTE — PROCEDURES
ECT Procedure    Patient Name:  Jacques Johnson   Medical Record Number: [id number]   YOB: 1970  Date of Procedure: 11/2/2018    Time out was taken with staff to confirm correct patient and correct procedure to be performed      Diagnosis: <principal problem not specified>    Treatment Number: 2    ECT Type: Outpatient    Electrode Placement: right unilateral    Post Ictal Suppression Index:  NA    % Energy Set: 65    Seizure Duration (OBS): 12 sec    Additional Notes: increase energy to 100%    Naveen Berkowitz MD

## 2018-11-05 ENCOUNTER — ANESTHESIA (OUTPATIENT)
Dept: PREOP/PACU | Facility: HOSPITAL | Age: 48
End: 2018-11-05

## 2018-11-05 ENCOUNTER — HOSPITAL ENCOUNTER (OUTPATIENT)
Dept: PREOP/PACU | Facility: HOSPITAL | Age: 48
Setting detail: OUTPATIENT SURGERY
Discharge: HOME | End: 2018-11-05
Payer: COMMERCIAL

## 2018-11-05 VITALS
TEMPERATURE: 97.9 F | OXYGEN SATURATION: 96 % | HEART RATE: 97 BPM | SYSTOLIC BLOOD PRESSURE: 162 MMHG | DIASTOLIC BLOOD PRESSURE: 77 MMHG | RESPIRATION RATE: 18 BRPM

## 2018-11-05 PROCEDURE — 90870 ELECTROCONVULSIVE THERAPY: CPT

## 2018-11-05 RX ORDER — ESMOLOL HYDROCHLORIDE 10 MG/ML
INJECTION INTRAVENOUS AS NEEDED
Status: DISCONTINUED | OUTPATIENT
Start: 2018-11-05 | End: 2018-11-05 | Stop reason: SURG

## 2018-11-05 RX ORDER — SUCCINYLCHOLINE CHLORIDE 20 MG/ML
INJECTION INTRAMUSCULAR; INTRAVENOUS AS NEEDED
Status: DISCONTINUED | OUTPATIENT
Start: 2018-11-05 | End: 2018-11-05 | Stop reason: SURG

## 2018-11-05 RX ORDER — SODIUM CHLORIDE 9 MG/ML
125 INJECTION, SOLUTION INTRAVENOUS CONTINUOUS
Status: DISCONTINUED | OUTPATIENT
Start: 2018-11-05 | End: 2018-11-09 | Stop reason: HOSPADM

## 2018-11-05 RX ORDER — SODIUM CHLORIDE 9 MG/ML
50 INJECTION, SOLUTION INTRAVENOUS CONTINUOUS
Status: DISCONTINUED | OUTPATIENT
Start: 2018-11-05 | End: 2018-11-09 | Stop reason: HOSPADM

## 2018-11-05 RX ORDER — LABETALOL HYDROCHLORIDE 5 MG/ML
5 INJECTION, SOLUTION INTRAVENOUS ONCE
Status: COMPLETED | OUTPATIENT
Start: 2018-11-05 | End: 2018-11-05

## 2018-11-05 RX ADMIN — SODIUM CHLORIDE 125 ML/HR: 9 INJECTION, SOLUTION INTRAVENOUS at 06:10

## 2018-11-05 RX ADMIN — ESMOLOL HYDROCHLORIDE 50 MG: 10 INJECTION INTRAVENOUS at 07:23

## 2018-11-05 RX ADMIN — SODIUM CHLORIDE: 9 INJECTION, SOLUTION INTRAVENOUS at 07:02

## 2018-11-05 RX ADMIN — LABETALOL HYDROCHLORIDE 5 MG: 5 INJECTION, SOLUTION INTRAVENOUS at 07:41

## 2018-11-05 RX ADMIN — Medication 100 MG: at 07:23

## 2018-11-05 RX ADMIN — SUCCINYLCHOLINE CHLORIDE 100 MG: 20 INJECTION, SOLUTION INTRAMUSCULAR; INTRAVENOUS at 07:23

## 2018-11-05 NOTE — PERIOPERATIVE NURSING NOTE
Returned from [employer] via litter  Still c/o anxious feeling but not as much  ivs infusing    Eating and drinking

## 2018-11-05 NOTE — PROCEDURES
ECT Procedure    Patient Name:  Carroll Merino   Medical Record Number: [id number]   YOB: 1970  Date of Procedure: 11/5/2018    Time out was taken with staff to confirm correct patient and correct procedure to be performed      Diagnosis: <principal problem not specified>    Treatment Number: 3    ECT Type: Outpatient    Electrode Placement: right unilateral    Post Ictal Suppression Index:  57%    % Energy Set: 65    Seizure Duration (OBS): 27 sec    Additional Notes: uneventful    Grant Plummer MD

## 2018-11-05 NOTE — ANESTHESIA POSTPROCEDURE EVALUATION
Post-Op Assessment Note      CV Status:  Stable    Mental Status:  Alert    Hydration Status:  Stable    PONV Controlled:  Controlled    Airway Patency:  Patent        Staff: CRNA           BP      Temp      Pulse    Resp      SpO2

## 2018-11-06 ENCOUNTER — ANESTHESIA EVENT (OUTPATIENT)
Dept: PREOP/PACU | Facility: HOSPITAL | Age: 48
End: 2018-11-06

## 2018-11-07 ENCOUNTER — HOSPITAL ENCOUNTER (OUTPATIENT)
Dept: PREOP/PACU | Facility: HOSPITAL | Age: 48
Setting detail: OUTPATIENT SURGERY
Discharge: HOME | End: 2018-11-07

## 2018-11-07 ENCOUNTER — ANESTHESIA (OUTPATIENT)
Dept: PREOP/PACU | Facility: HOSPITAL | Age: 48
End: 2018-11-07

## 2018-11-08 ENCOUNTER — ANESTHESIA EVENT (OUTPATIENT)
Dept: PREOP/PACU | Facility: HOSPITAL | Age: 48
End: 2018-11-08

## 2018-11-09 ENCOUNTER — ANESTHESIA EVENT (OUTPATIENT)
Dept: PREOP/PACU | Facility: HOSPITAL | Age: 48
End: 2018-11-09

## 2018-11-09 ENCOUNTER — ANESTHESIA (OUTPATIENT)
Dept: PREOP/PACU | Facility: HOSPITAL | Age: 48
End: 2018-11-09

## 2018-11-09 ENCOUNTER — HOSPITAL ENCOUNTER (OUTPATIENT)
Dept: PREOP/PACU | Facility: HOSPITAL | Age: 48
Setting detail: OUTPATIENT SURGERY
Discharge: HOME | End: 2018-11-09
Payer: COMMERCIAL

## 2018-11-09 VITALS
HEART RATE: 100 BPM | TEMPERATURE: 98.4 F | OXYGEN SATURATION: 96 % | DIASTOLIC BLOOD PRESSURE: 76 MMHG | SYSTOLIC BLOOD PRESSURE: 133 MMHG | RESPIRATION RATE: 20 BRPM

## 2018-11-09 PROCEDURE — 90870 ELECTROCONVULSIVE THERAPY: CPT

## 2018-11-09 RX ORDER — SODIUM CHLORIDE 9 MG/ML
125 INJECTION, SOLUTION INTRAVENOUS CONTINUOUS
Status: DISCONTINUED | OUTPATIENT
Start: 2018-11-09 | End: 2018-11-13 | Stop reason: HOSPADM

## 2018-11-09 RX ORDER — ESMOLOL HYDROCHLORIDE 10 MG/ML
INJECTION INTRAVENOUS AS NEEDED
Status: DISCONTINUED | OUTPATIENT
Start: 2018-11-09 | End: 2018-11-09 | Stop reason: SURG

## 2018-11-09 RX ORDER — KETOROLAC TROMETHAMINE 30 MG/ML
INJECTION, SOLUTION INTRAMUSCULAR; INTRAVENOUS AS NEEDED
Status: DISCONTINUED | OUTPATIENT
Start: 2018-11-09 | End: 2018-11-09 | Stop reason: SURG

## 2018-11-09 RX ORDER — SUCCINYLCHOLINE CHLORIDE 20 MG/ML
INJECTION INTRAMUSCULAR; INTRAVENOUS AS NEEDED
Status: DISCONTINUED | OUTPATIENT
Start: 2018-11-09 | End: 2018-11-09 | Stop reason: SURG

## 2018-11-09 RX ADMIN — ESMOLOL HYDROCHLORIDE 50 MG: 10 INJECTION INTRAVENOUS at 06:55

## 2018-11-09 RX ADMIN — SODIUM CHLORIDE: 0.9 INJECTION, SOLUTION INTRAVENOUS at 06:30

## 2018-11-09 RX ADMIN — Medication 100 MG: at 06:55

## 2018-11-09 RX ADMIN — KETOROLAC TROMETHAMINE 30 MG: 30 INJECTION, SOLUTION INTRAMUSCULAR; INTRAVENOUS at 06:50

## 2018-11-09 RX ADMIN — SUCCINYLCHOLINE CHLORIDE 100 MG: 20 INJECTION, SOLUTION INTRAMUSCULAR; INTRAVENOUS at 06:55

## 2018-11-09 NOTE — ANESTHESIA PREPROCEDURE EVALUATION
Pt had  ECTs previously without anesthesia complications  H & P Reviewed, no interval change  Anesthesia Plan  ASA Score- 2     Anesthesia Type- general with ASA Monitors  Additional Monitors:   Airway Plan:         Plan Factors-    Induction- intravenous  Postoperative Plan-     Informed Consent- Anesthetic plan and risks discussed with patient  I personally reviewed this patient with the CRNA  Discussed and agreed on the Anesthesia Plan with the CRNA  Song Nelson

## 2018-11-09 NOTE — PERIOPERATIVE NURSING NOTE
ivs out  Tolerated diet  Discharged ambulatory after discharge instructions given and verbalized an understanding of same

## 2018-11-09 NOTE — PROCEDURES
ECT Procedure    Patient Name:  Matti Guardado   Medical Record Number: [id number]   YOB: 1970  Date of Procedure: 11/9/2018    Time out was taken with staff to confirm correct patient and correct procedure to be performed      Diagnosis: <principal problem not specified>    Treatment Number: 4    ECT Type: Outpatient    Electrode Placement: right unilateral    Post Ictal Suppression Index:  55%    % Energy Set: 50    Seizure Duration (OBS): 23 sec    Additional Notes: uneventful    Parker Morales MD

## 2018-11-12 ENCOUNTER — HOSPITAL ENCOUNTER (OUTPATIENT)
Dept: PREOP/PACU | Facility: HOSPITAL | Age: 48
Setting detail: OUTPATIENT SURGERY
Discharge: HOME | End: 2018-11-12
Payer: COMMERCIAL

## 2018-11-12 ENCOUNTER — ANESTHESIA (OUTPATIENT)
Dept: PREOP/PACU | Facility: HOSPITAL | Age: 48
End: 2018-11-12

## 2018-11-12 VITALS
TEMPERATURE: 97 F | DIASTOLIC BLOOD PRESSURE: 86 MMHG | RESPIRATION RATE: 20 BRPM | OXYGEN SATURATION: 96 % | HEART RATE: 94 BPM | SYSTOLIC BLOOD PRESSURE: 147 MMHG

## 2018-11-12 PROCEDURE — 90870 ELECTROCONVULSIVE THERAPY: CPT

## 2018-11-12 RX ORDER — SUCCINYLCHOLINE CHLORIDE 20 MG/ML
INJECTION INTRAMUSCULAR; INTRAVENOUS AS NEEDED
Status: DISCONTINUED | OUTPATIENT
Start: 2018-11-12 | End: 2018-11-12 | Stop reason: SURG

## 2018-11-12 RX ORDER — ESMOLOL HYDROCHLORIDE 10 MG/ML
INJECTION INTRAVENOUS AS NEEDED
Status: DISCONTINUED | OUTPATIENT
Start: 2018-11-12 | End: 2018-11-12 | Stop reason: SURG

## 2018-11-12 RX ORDER — KETOROLAC TROMETHAMINE 30 MG/ML
INJECTION, SOLUTION INTRAMUSCULAR; INTRAVENOUS AS NEEDED
Status: DISCONTINUED | OUTPATIENT
Start: 2018-11-12 | End: 2018-11-12 | Stop reason: SURG

## 2018-11-12 RX ORDER — SODIUM CHLORIDE 9 MG/ML
50 INJECTION, SOLUTION INTRAVENOUS CONTINUOUS
Status: DISCONTINUED | OUTPATIENT
Start: 2018-11-12 | End: 2018-11-16 | Stop reason: HOSPADM

## 2018-11-12 RX ADMIN — SODIUM CHLORIDE: 0.9 INJECTION, SOLUTION INTRAVENOUS at 06:30

## 2018-11-12 RX ADMIN — SODIUM CHLORIDE 50 ML/HR: 9 INJECTION, SOLUTION INTRAVENOUS at 05:55

## 2018-11-12 RX ADMIN — ESMOLOL HYDROCHLORIDE 50 MG: 10 INJECTION INTRAVENOUS at 06:55

## 2018-11-12 RX ADMIN — Medication 100 MG: at 06:55

## 2018-11-12 RX ADMIN — KETOROLAC TROMETHAMINE 30 MG: 30 INJECTION, SOLUTION INTRAMUSCULAR; INTRAVENOUS at 06:45

## 2018-11-12 RX ADMIN — SUCCINYLCHOLINE CHLORIDE 100 MG: 20 INJECTION, SOLUTION INTRAMUSCULAR; INTRAVENOUS at 06:55

## 2018-11-12 NOTE — PROCEDURES
ECT Procedure    Patient Name:  Jose Francisco Brandon   Medical Record Number: [id number]   YOB: 1970  Date of Procedure: 11/12/2018    Time out was taken with staff to confirm correct patient and correct procedure to be performed      Diagnosis: <principal problem not specified>    Treatment Number: 5    ECT Type: Outpatient    Electrode Placement: right unilateral    Post Ictal Suppression Index:  76    % Energy Set: 50    Seizure Duration (OBS): 20 sec    Additional Notes: increase energy to 60%    Darwin Baltazar MD

## 2018-11-12 NOTE — ANESTHESIA POSTPROCEDURE EVALUATION
Post-Op Assessment Note      CV Status:  Stable    Mental Status:  Alert and awake    Hydration Status:  Euvolemic    PONV Controlled:  Controlled    Airway Patency:  Patent    Post Op Vitals Reviewed: Yes          Staff: CRNA           /75 (11/12/18 0651)    Temp      Pulse 100 (11/12/18 0651)   Resp 16 (11/12/18 0651)    SpO2 96 % (11/12/18 0651)

## 2018-11-12 NOTE — ANESTHESIA PREPROCEDURE EVALUATION
Review of Systems/Medical History  Patient summary reviewed  Chart reviewed  No history of anesthetic complications     Cardiovascular  Exercise tolerance (METS): >4,  Hyperlipidemia, Hypertension controlled,    Pulmonary  Smoker Cumulative Pack Years: 27, COPD (chr bronchitis) ,   Comment: Old healed rib fx right side     GI/Hepatic      Comment: H/O IBS     Negative  ROS        Endo/Other  Negative endo/other ROS      GYN      Comment: Post menopause     Hematology  Negative hematology ROS      Musculoskeletal       Neurology  Negative neurology ROS      Psychology   Anxiety, Depression , bipolar disorder,   Chronic pain (on tramadol),        Basic metabolic panel   Order: [id number]   Status:  Final result   Visible to patient:  No (Inaccessible in MyChart) Next appt:  10/31/2018 at 12:30 PM in [facility] (1)     Ref Range & Units 10/28/18 0800 5/22/18 0537    Sodium 137 - 147 mmol/L 137  141R     Potassium 3 6 - 5 0 mmol/L 4 6  3 6R     Chloride 97 - 108 mmol/L 103  106R     CO2 22 - 30 mmol/L 28  29R     ANION GAP 5 - 14 mmol/L 6  6R     BUN 5 - 25 mg/dL 17  3      Creatinine 0 60 - 1 20 mg/dL 0 71  0 67R, CM    Comment: Standardized to IDMS reference method    Glucose 70 - 99 mg/dL 122   101R, CM    Comment:    If the patient is fasting, the ADA then defines impaired fasting glucose as > 100 mg/dL and diabetes as > or equal to 123 mg/dL  Specimen collection should occur prior to Sulfasalazine administration due to the potential for falsely depressed results  Specimen collection should occur prior to Sulfapyridine administration due to the potential for falsely elevated results      Calcium 8 4 - 10 2 mg/dL 9 4  9 1R     eGFR >60 ml/min/1 73sq m 100  104R    Narrative       National Kidney Disease Education Program recommendations are as follows:  GFR calculation is accurate only with a steady state creatinine  Chronic Kidney disease less than 60 ml/min/1 73 sq  meters  Kidney failure less than 15 ml/min/1 73 sq  meters  Specimen Collected: 10/28/18 08:00 Last Resulted: 10/28/18 08:17              CM=Additional comments  R=Reference range differs from displayed range                EKG NSR    Physical Exam    Airway    Mallampati score: II  TM Distance: >3 FB  Neck ROM: full     Dental       Cardiovascular  Cardiovascular exam normal    Pulmonary  Pulmonary exam normal     Other Findings        Anesthesia Plan  ASA Score- 2     Anesthesia Type- general with ASA Monitors  Additional Monitors:   Airway Plan:     Comment: Pt tolerated anesthesia well for last ECT  No new changes  Pt c/o body pain ( myalgia)  following last ect  H and P reviewed  No new complaints question answered ok to proceed  Plan Factors-    Induction- intravenous  Postoperative Plan-     Informed Consent- Anesthetic plan and risks discussed with patient  I personally reviewed this patient with the CRNA  Discussed and agreed on the Anesthesia Plan with the CRNA  Karrie Nissen H and P reviewed  No new changes  Recent lab reviewed   Plan discussed

## 2018-11-12 NOTE — PERIOPERATIVE NURSING NOTE
ivs out  Wants to go home  Discharged ambulatory after discharge instructions given and verbalized an understnding of same

## 2018-11-13 ENCOUNTER — ANESTHESIA EVENT (OUTPATIENT)
Dept: PREOP/PACU | Facility: HOSPITAL | Age: 48
End: 2018-11-13

## 2018-11-14 ENCOUNTER — ANESTHESIA (OUTPATIENT)
Dept: PREOP/PACU | Facility: HOSPITAL | Age: 48
End: 2018-11-14

## 2018-11-14 ENCOUNTER — HOSPITAL ENCOUNTER (OUTPATIENT)
Dept: PREOP/PACU | Facility: HOSPITAL | Age: 48
Setting detail: OUTPATIENT SURGERY
Discharge: HOME | End: 2018-11-14
Payer: COMMERCIAL

## 2018-11-14 VITALS
HEIGHT: 65 IN | RESPIRATION RATE: 18 BRPM | WEIGHT: 173 LBS | TEMPERATURE: 97.8 F | SYSTOLIC BLOOD PRESSURE: 138 MMHG | HEART RATE: 101 BPM | BODY MASS INDEX: 28.82 KG/M2 | DIASTOLIC BLOOD PRESSURE: 89 MMHG | OXYGEN SATURATION: 99 %

## 2018-11-14 PROCEDURE — 90870 ELECTROCONVULSIVE THERAPY: CPT

## 2018-11-14 RX ORDER — GLYCOPYRROLATE 0.2 MG/ML
INJECTION INTRAMUSCULAR; INTRAVENOUS AS NEEDED
Status: DISCONTINUED | OUTPATIENT
Start: 2018-11-14 | End: 2018-11-14 | Stop reason: SURG

## 2018-11-14 RX ORDER — SODIUM CHLORIDE 9 MG/ML
50 INJECTION, SOLUTION INTRAVENOUS CONTINUOUS
Status: DISCONTINUED | OUTPATIENT
Start: 2018-11-15 | End: 2018-11-18 | Stop reason: HOSPADM

## 2018-11-14 RX ORDER — SUCCINYLCHOLINE CHLORIDE 20 MG/ML
INJECTION INTRAMUSCULAR; INTRAVENOUS AS NEEDED
Status: DISCONTINUED | OUTPATIENT
Start: 2018-11-14 | End: 2018-11-14 | Stop reason: SURG

## 2018-11-14 RX ORDER — KETOROLAC TROMETHAMINE 30 MG/ML
INJECTION, SOLUTION INTRAMUSCULAR; INTRAVENOUS AS NEEDED
Status: DISCONTINUED | OUTPATIENT
Start: 2018-11-14 | End: 2018-11-14 | Stop reason: SURG

## 2018-11-14 RX ORDER — ESMOLOL HYDROCHLORIDE 10 MG/ML
INJECTION INTRAVENOUS AS NEEDED
Status: DISCONTINUED | OUTPATIENT
Start: 2018-11-14 | End: 2018-11-14 | Stop reason: SURG

## 2018-11-14 RX ADMIN — SODIUM CHLORIDE: 9 INJECTION, SOLUTION INTRAVENOUS at 06:27

## 2018-11-14 RX ADMIN — SUCCINYLCHOLINE CHLORIDE 80 MG: 20 INJECTION, SOLUTION INTRAMUSCULAR; INTRAVENOUS at 07:02

## 2018-11-14 RX ADMIN — KETOROLAC TROMETHAMINE 30 MG: 30 INJECTION, SOLUTION INTRAMUSCULAR; INTRAVENOUS at 07:02

## 2018-11-14 RX ADMIN — ESMOLOL HYDROCHLORIDE 50 MG: 10 INJECTION INTRAVENOUS at 07:05

## 2018-11-14 RX ADMIN — Medication 100 MG: at 07:02

## 2018-11-14 RX ADMIN — SODIUM CHLORIDE 50 ML/HR: 9 INJECTION, SOLUTION INTRAVENOUS at 06:05

## 2018-11-14 RX ADMIN — GLYCOPYRROLATE 0.2 MG: 0.2 INJECTION, SOLUTION INTRAMUSCULAR; INTRAVENOUS at 07:02

## 2018-11-14 NOTE — ANESTHESIA PREPROCEDURE EVALUATION
Review of Systems/Medical History  Patient summary reviewed  Chart reviewed  No history of anesthetic complications     Cardiovascular  Exercise tolerance (METS): >4,  Hyperlipidemia, Hypertension controlled,    Pulmonary  Smoker Cumulative Pack Years: 27, COPD (chr bronchitis) ,   Comment: Old healed rib fx right side     GI/Hepatic      Comment: H/O IBS     Negative  ROS        Endo/Other  Negative endo/other ROS      GYN      Comment: Post menopause     Hematology  Negative hematology ROS      Musculoskeletal       Neurology  Negative neurology ROS      Psychology   Anxiety, Depression , bipolar disorder,   Chronic pain (on tramadol),        Basic metabolic panel   Order: [id number]   Status:  Final result   Visible to patient:  No (Inaccessible in MyChart) Next appt:  10/31/2018 at 12:30 PM in [facility] (1)     Ref Range & Units 10/28/18 0800 5/22/18 0537    Sodium 137 - 147 mmol/L 137  141R     Potassium 3 6 - 5 0 mmol/L 4 6  3 6R     Chloride 97 - 108 mmol/L 103  106R     CO2 22 - 30 mmol/L 28  29R     ANION GAP 5 - 14 mmol/L 6  6R     BUN 5 - 25 mg/dL 17  3      Creatinine 0 60 - 1 20 mg/dL 0 71  0 67R, CM    Comment: Standardized to IDMS reference method    Glucose 70 - 99 mg/dL 122   101R, CM    Comment:    If the patient is fasting, the ADA then defines impaired fasting glucose as > 100 mg/dL and diabetes as > or equal to 123 mg/dL  Specimen collection should occur prior to Sulfasalazine administration due to the potential for falsely depressed results  Specimen collection should occur prior to Sulfapyridine administration due to the potential for falsely elevated results      Calcium 8 4 - 10 2 mg/dL 9 4  9 1R     eGFR >60 ml/min/1 73sq m 100  104R    Narrative       National Kidney Disease Education Program recommendations are as follows:  GFR calculation is accurate only with a steady state creatinine  Chronic Kidney disease less than 60 ml/min/1 73 sq  meters  Kidney failure less than 15 ml/min/1 73 sq  meters  Specimen Collected: 10/28/18 08:00 Last Resulted: 10/28/18 08:17              CM=Additional comments  R=Reference range differs from displayed range                EKG NSR    Physical Exam    Airway    Mallampati score: II  TM Distance: >3 FB  Neck ROM: full     Dental       Cardiovascular  Cardiovascular exam normal    Pulmonary  Pulmonary exam normal     Other Findings        Anesthesia Plan  ASA Score- 2     Anesthesia Type- general with ASA Monitors  Additional Monitors:   Airway Plan:     Comment: Pt says she woke up weak last time  We will see if we can reduce the dose of such   H and P reviewed  No other complaints question answered ok to proceed  Plan Factors-Patient not instructed to abstain from smoking on day of procedure  Patient did not smoke on day of surgery  Induction- intravenous  Postoperative Plan-     Informed Consent- Anesthetic plan and risks discussed with patient  I personally reviewed this patient with the CRNA  Discussed and agreed on the Anesthesia Plan with the CRNA  Francisco MEANS and P reviewed  No new changes  Recent lab reviewed   Plan discussed

## 2018-11-14 NOTE — ANESTHESIA POSTPROCEDURE EVALUATION
Post-Op Assessment Note      CV Status:  Stable    Mental Status:  Alert and awake    Hydration Status:  Euvolemic    PONV Controlled:  Controlled    Airway Patency:  Patent    Post Op Vitals Reviewed: Yes          Staff: CRNA           BP   148/88   Temp      Pulse  112   Resp   16   SpO2   99

## 2018-11-14 NOTE — PROCEDURES
ECT Procedure    Patient Name:  Karina Bro   Medical Record Number: [id number]   YOB: 1970  Date of Procedure: 11/14/2018    Time out was taken with staff to confirm correct patient and correct procedure to be performed      Diagnosis: <principal problem not specified>    Treatment Number: 6    ECT Type: Outpatient    Electrode Placement: right unilateral    Post Ictal Suppression Index:  na    % Energy Set: 60    Seizure Duration (OBS): 27 sec    Additional Notes: unevencasper Mejias MD

## 2018-11-15 ENCOUNTER — ANESTHESIA EVENT (OUTPATIENT)
Dept: PREOP/PACU | Facility: HOSPITAL | Age: 48
End: 2018-11-15

## 2018-11-15 RX ORDER — SODIUM CHLORIDE 9 MG/ML
50 INJECTION, SOLUTION INTRAVENOUS CONTINUOUS
Status: CANCELLED | OUTPATIENT
Start: 2018-11-16

## 2018-11-16 ENCOUNTER — ANESTHESIA (OUTPATIENT)
Dept: PREOP/PACU | Facility: HOSPITAL | Age: 48
End: 2018-11-16

## 2018-11-16 ENCOUNTER — HOSPITAL ENCOUNTER (OUTPATIENT)
Dept: PREOP/PACU | Facility: HOSPITAL | Age: 48
Setting detail: OUTPATIENT SURGERY
Discharge: HOME | End: 2018-11-16

## 2018-11-18 ENCOUNTER — ANESTHESIA EVENT (OUTPATIENT)
Dept: PREOP/PACU | Facility: HOSPITAL | Age: 48
End: 2018-11-18

## 2018-11-19 ENCOUNTER — HOSPITAL ENCOUNTER (OUTPATIENT)
Dept: PREOP/PACU | Facility: HOSPITAL | Age: 48
Setting detail: OUTPATIENT SURGERY
Discharge: HOME | End: 2018-11-19
Payer: COMMERCIAL

## 2018-11-19 ENCOUNTER — ANESTHESIA (OUTPATIENT)
Dept: PREOP/PACU | Facility: HOSPITAL | Age: 48
End: 2018-11-19

## 2018-11-19 VITALS
DIASTOLIC BLOOD PRESSURE: 74 MMHG | BODY MASS INDEX: 26.52 KG/M2 | WEIGHT: 165 LBS | TEMPERATURE: 97.6 F | HEIGHT: 66 IN | SYSTOLIC BLOOD PRESSURE: 146 MMHG | HEART RATE: 100 BPM | RESPIRATION RATE: 18 BRPM | OXYGEN SATURATION: 97 %

## 2018-11-19 PROCEDURE — 90870 ELECTROCONVULSIVE THERAPY: CPT

## 2018-11-19 RX ORDER — SODIUM CHLORIDE 9 MG/ML
50 INJECTION, SOLUTION INTRAVENOUS CONTINUOUS
Status: DISCONTINUED | OUTPATIENT
Start: 2018-11-20 | End: 2018-11-23 | Stop reason: HOSPADM

## 2018-11-19 RX ORDER — ESMOLOL HYDROCHLORIDE 10 MG/ML
INJECTION INTRAVENOUS AS NEEDED
Status: DISCONTINUED | OUTPATIENT
Start: 2018-11-19 | End: 2018-11-19 | Stop reason: SURG

## 2018-11-19 RX ORDER — SUCCINYLCHOLINE CHLORIDE 20 MG/ML
INJECTION INTRAMUSCULAR; INTRAVENOUS AS NEEDED
Status: DISCONTINUED | OUTPATIENT
Start: 2018-11-19 | End: 2018-11-19 | Stop reason: SURG

## 2018-11-19 RX ORDER — SODIUM CHLORIDE 9 MG/ML
125 INJECTION, SOLUTION INTRAVENOUS CONTINUOUS
Status: DISCONTINUED | OUTPATIENT
Start: 2018-11-19 | End: 2018-11-23 | Stop reason: HOSPADM

## 2018-11-19 RX ORDER — KETOROLAC TROMETHAMINE 30 MG/ML
INJECTION, SOLUTION INTRAMUSCULAR; INTRAVENOUS AS NEEDED
Status: DISCONTINUED | OUTPATIENT
Start: 2018-11-19 | End: 2018-11-19 | Stop reason: SURG

## 2018-11-19 RX ADMIN — Medication 100 MG: at 06:43

## 2018-11-19 RX ADMIN — SUCCINYLCHOLINE CHLORIDE 80 MG: 20 INJECTION, SOLUTION INTRAMUSCULAR; INTRAVENOUS at 06:43

## 2018-11-19 RX ADMIN — KETOROLAC TROMETHAMINE 30 MG: 30 INJECTION, SOLUTION INTRAMUSCULAR; INTRAVENOUS at 06:39

## 2018-11-19 RX ADMIN — SODIUM CHLORIDE 125 ML/HR: 9 INJECTION, SOLUTION INTRAVENOUS at 06:00

## 2018-11-19 RX ADMIN — ESMOLOL HYDROCHLORIDE 50 MG: 10 INJECTION INTRAVENOUS at 06:43

## 2018-11-19 NOTE — ANESTHESIA POSTPROCEDURE EVALUATION
Post-Op Assessment Note      CV Status:  Stable    Mental Status:  Awake    Hydration Status:  Stable    PONV Controlled:  None    Airway Patency:  Patent    Post Op Vitals Reviewed: Yes          Staff: Anesthesiologist

## 2018-11-19 NOTE — DISCHARGE INSTRUCTIONS
Electroconvulsive Therapy   WHAT YOU NEED TO KNOW:   Electroconvulsive therapy (ECT) is a treatment that sends a small electric current to your brain to cause a seizure  The seizure affects the chemicals in your brain, which may make your brain cells work better  ECT is used to treat certain conditions, such as depression, that do not get better after medicines or other therapies have been tried  DISCHARGE INSTRUCTIONS:   Medicines:   · Antidepressants: You may be given this medicine to help decrease or prevent symptoms of depression  · Take your medicine as directed  Contact your healthcare provider if you think your medicine is not helping or if you have side effects  Tell him or her if you are allergic to any medicine  Keep a list of the medicines, vitamins, and herbs you take  Include the amounts, and when and why you take them  Bring the list or the pill bottles to follow-up visits  Carry your medicine list with you in case of an emergency  Follow up with your healthcare provider as directed:  Write down your questions so you remember to ask them during your visits  Memory loss and headaches:   · ECT may cause memory loss and confusion  Your confusion may go away in a short time, such as 1 hour after your treatment  You may lose your memory for 1 to 3 weeks, and some memories may be lost forever  · You may also get a headache after an ECT treatment  These headaches usually only last a short time  If you have a headache after ECT, ask your healthcare provider for medicine to make it go away  If more ECT treatments are planned for you, ask the healthcare provider to give you medicine before the treatments to help prevent headaches  · There is a greater chance that you will fall after ECT treatments  Ask someone to help you when you want to stand up or walk  Ask your healthcare provider for more information on how to prevent falls    Contact your healthcare provider if:   · You have severe pain in your back or neck  · You have questions or concerns about your condition or care  Seek care immediately or call 911 if:   · You have a fever  · You have a severe headache that does not get better, even after you take medicine to treat it  · You have a stiff neck or trouble thinking clearly  · You have feelings of guilt or hopelessness, or thoughts of hurting or killing yourself or others  · You have shortness of breath, chest pain, or a fast heartbeat  © 2017 [employer]  Information is for End User's use only and may not be sold, redistributed or otherwise used for commercial purposes  All illustrations and images included in CareNotes® are the copyrighted property of [employer]  or Alonso Garcia  The above information is an  only  It is not intended as medical advice for individual conditions or treatments  Talk to your doctor, nurse or pharmacist before following any medical regimen to see if it is safe and effective for you

## 2018-11-19 NOTE — ANESTHESIA PREPROCEDURE EVALUATION
Pt had  ECTs previously without anesthesia complications  H & P Reviewed, no interval change  Anesthesia Plan  ASA Score- 2     Anesthesia Type- general with ASA Monitors  Additional Monitors:   Airway Plan:         Plan Factors-    Induction- intravenous  Postoperative Plan-     Informed Consent- Anesthetic plan and risks discussed with patient  I personally reviewed this patient with the CRNA  Discussed and agreed on the Anesthesia Plan with the CRNA  Rosendo Ramos

## 2018-11-19 NOTE — PROCEDURES
ECT Procedure    Patient Name:  Maribel Coates   Medical Record Number: [id number]   YOB: 1970  Date of Procedure: 11/19/2018    Time out was taken with staff to confirm correct patient and correct procedure to be performed      Diagnosis: <principal problem not specified>    Treatment Number: 7    ECT Type: Outpatient    Electrode Placement: right unilateral    Post Ictal Suppression Index:  NA    % Energy Set: 65    Seizure Duration (OBS): 10 sec    Additional Notes: increase energy to 80%    Zenobia Hawkins MD

## 2018-11-23 ENCOUNTER — ANESTHESIA EVENT (OUTPATIENT)
Dept: PREOP/PACU | Facility: HOSPITAL | Age: 48
End: 2018-11-23

## 2018-11-23 ENCOUNTER — HOSPITAL ENCOUNTER (OUTPATIENT)
Dept: PREOP/PACU | Facility: HOSPITAL | Age: 48
Setting detail: OUTPATIENT SURGERY
Discharge: HOME | End: 2018-11-23
Payer: COMMERCIAL

## 2018-11-23 ENCOUNTER — ANESTHESIA (OUTPATIENT)
Dept: PREOP/PACU | Facility: HOSPITAL | Age: 48
End: 2018-11-23

## 2018-11-23 VITALS
SYSTOLIC BLOOD PRESSURE: 137 MMHG | HEART RATE: 112 BPM | RESPIRATION RATE: 18 BRPM | OXYGEN SATURATION: 96 % | TEMPERATURE: 98 F | DIASTOLIC BLOOD PRESSURE: 76 MMHG

## 2018-11-23 PROCEDURE — 90870 ELECTROCONVULSIVE THERAPY: CPT

## 2018-11-23 RX ORDER — SODIUM CHLORIDE 9 MG/ML
50 INJECTION, SOLUTION INTRAVENOUS CONTINUOUS
Status: DISCONTINUED | OUTPATIENT
Start: 2018-11-23 | End: 2018-11-27 | Stop reason: HOSPADM

## 2018-11-23 RX ORDER — ETOMIDATE 2 MG/ML
INJECTION INTRAVENOUS AS NEEDED
Status: DISCONTINUED | OUTPATIENT
Start: 2018-11-23 | End: 2018-11-23 | Stop reason: SURG

## 2018-11-23 RX ORDER — ESMOLOL HYDROCHLORIDE 10 MG/ML
INJECTION, SOLUTION INTRAVENOUS AS NEEDED
Status: DISCONTINUED | OUTPATIENT
Start: 2018-11-23 | End: 2018-11-23

## 2018-11-23 RX ORDER — SUCCINYLCHOLINE CHLORIDE 20 MG/ML
INJECTION INTRAMUSCULAR; INTRAVENOUS AS NEEDED
Status: DISCONTINUED | OUTPATIENT
Start: 2018-11-23 | End: 2018-11-23 | Stop reason: SURG

## 2018-11-23 RX ADMIN — ETOMIDATE 50 MG: 2 INJECTION, SOLUTION INTRAVENOUS at 07:05

## 2018-11-23 RX ADMIN — SODIUM CHLORIDE 50 ML/HR: 9 INJECTION, SOLUTION INTRAVENOUS at 07:33

## 2018-11-23 RX ADMIN — SUCCINYLCHOLINE CHLORIDE 80 MG: 20 INJECTION, SOLUTION INTRAMUSCULAR; INTRAVENOUS at 07:09

## 2018-11-23 RX ADMIN — Medication 100 MG: at 07:09

## 2018-11-23 NOTE — DISCHARGE INSTRUCTIONS
Electroconvulsive Therapy   WHAT YOU NEED TO KNOW:   Electroconvulsive therapy (ECT) is a treatment that sends a small electric current to your brain to cause a seizure  The seizure affects the chemicals in your brain, which may make your brain cells work better  ECT is used to treat certain conditions, such as depression, that do not get better after medicines or other therapies have been tried  DISCHARGE INSTRUCTIONS:   Medicines:   · Antidepressants: You may be given this medicine to help decrease or prevent symptoms of depression  · Take your medicine as directed  Contact your healthcare provider if you think your medicine is not helping or if you have side effects  Tell him or her if you are allergic to any medicine  Keep a list of the medicines, vitamins, and herbs you take  Include the amounts, and when and why you take them  Bring the list or the pill bottles to follow-up visits  Carry your medicine list with you in case of an emergency  Follow up with your healthcare provider as directed:  Write down your questions so you remember to ask them during your visits  Memory loss and headaches:   · ECT may cause memory loss and confusion  Your confusion may go away in a short time, such as 1 hour after your treatment  You may lose your memory for 1 to 3 weeks, and some memories may be lost forever  · You may also get a headache after an ECT treatment  These headaches usually only last a short time  If you have a headache after ECT, ask your healthcare provider for medicine to make it go away  If more ECT treatments are planned for you, ask the healthcare provider to give you medicine before the treatments to help prevent headaches  · There is a greater chance that you will fall after ECT treatments  Ask someone to help you when you want to stand up or walk  Ask your healthcare provider for more information on how to prevent falls    Contact your healthcare provider if:   · You have severe pain in your back or neck  · You have questions or concerns about your condition or care  Seek care immediately or call 911 if:   · You have a fever  · You have a severe headache that does not get better, even after you take medicine to treat it  · You have a stiff neck or trouble thinking clearly  · You have feelings of guilt or hopelessness, or thoughts of hurting or killing yourself or others  · You have shortness of breath, chest pain, or a fast heartbeat  © 2017 [address] [facility] Information is for End User's use only and may not be sold, redistributed or otherwise used for commercial purposes  All illustrations and images included in CareNotes® are the copyrighted property of [employer]  or Alonso Garcia  The above information is an  only  It is not intended as medical advice for individual conditions or treatments  Talk to your doctor, nurse or pharmacist before following any medical regimen to see if it is safe and effective for you

## 2018-11-23 NOTE — ANESTHESIA PREPROCEDURE EVALUATION
Review of Systems/Medical History  Patient summary reviewed  Chart reviewed  No history of anesthetic complications     Cardiovascular  Exercise tolerance (METS): >4,  Hyperlipidemia, Hypertension controlled,    Pulmonary  COPD mild- PRN medicaiton ,        GI/Hepatic  Negative GI/hepatic ROS          Negative  ROS        Endo/Other  Negative endo/other ROS      GYN  Not currently pregnant ,          Hematology  Negative hematology ROS      Musculoskeletal  Negative musculoskeletal ROS        Neurology  Negative neurology ROS      Psychology   Anxiety, Depression , being treated for depression,              Physical Exam    Airway    Mallampati score: II  TM Distance: >3 FB  Neck ROM: full     Dental       Cardiovascular  Cardiovascular exam normal    Pulmonary  Pulmonary exam normal     Other Findings  Fixed upper and lower teeth and in good repair      Anesthesia Plan  ASA Score- 2     Anesthesia Type- general with ASA Monitors  Additional Monitors:   Airway Plan:         Plan Factors-Patient not instructed to abstain from smoking on day of procedure  Patient did not smoke on day of surgery  Induction- intravenous  Postoperative Plan-     Informed Consent- Anesthetic plan and risks discussed with patient

## 2018-11-23 NOTE — PROCEDURES
ECT Procedure    Patient Name:  Brookie Felty   Medical Record Number: [id number]   YOB: 1970  Date of Procedure: 11/23/2018    Time out was taken with staff to confirm correct patient and correct procedure to be performed      Diagnosis: <principal problem not specified>    Treatment Number: 8    ECT Type: Outpatient    Electrode Placement: right unilateral    Post Ictal Suppression Index:  58%    % Energy Set: 80    Seizure Duration (OBS): 23 sec    Additional Notes: uneventful; pt more depressed last few days, will need additional ect    Laureano Nascimento MD

## 2018-11-23 NOTE — NURSING NOTE
Returned from ECT at 89[address]  [address], emotional support given by staff  "I feel depressed, but I will feel better when I get home to my "  Smiled when talking about her  and her dog  Respirations easy and non labored  IV removed  Tolerating water  Did not want anything to eat  Discharge instructions were given   was called to take her home

## 2018-11-28 ENCOUNTER — HOSPITAL ENCOUNTER (OUTPATIENT)
Dept: PREOP/PACU | Facility: HOSPITAL | Age: 48
Setting detail: OUTPATIENT SURGERY
Discharge: HOME | End: 2018-11-28

## 2018-12-04 ENCOUNTER — ANESTHESIA EVENT (OUTPATIENT)
Dept: PREOP/PACU | Facility: HOSPITAL | Age: 48
End: 2018-12-04

## 2018-12-04 NOTE — ANESTHESIA PREPROCEDURE EVALUATION
Review of Systems/Medical History  Patient summary reviewed  Chart reviewed  No history of anesthetic complications     Cardiovascular  Exercise tolerance (METS): >4,  Hypertension controlled,    Pulmonary  Smoker Cumulative Pack Years: 27, COPD (chr bronchitis) ,   Comment: Old healed rib fx right side     GI/Hepatic      Comment: H/O IBS     Negative  ROS        Endo/Other  Negative endo/other ROS      GYN      Comment: Post menopause     Hematology  Negative hematology ROS      Musculoskeletal       Neurology  Negative neurology ROS      Psychology   Anxiety, Depression , bipolar disorder,   Chronic pain (on tramadol),        Basic metabolic panel   Order: [id number]   Status:  Final result   Visible to patient:  No (Inaccessible in MyChart) Next appt:  10/31/2018 at 12:30 PM in [facility] (1)     Ref Range & Units 10/28/18 0800 5/22/18 0537    Sodium 137 - 147 mmol/L 137  141R     Potassium 3 6 - 5 0 mmol/L 4 6  3 6R     Chloride 97 - 108 mmol/L 103  106R     CO2 22 - 30 mmol/L 28  29R     ANION GAP 5 - 14 mmol/L 6  6R     BUN 5 - 25 mg/dL 17  3      Creatinine 0 60 - 1 20 mg/dL 0 71  0 67R, CM    Comment: Standardized to IDMS reference method    Glucose 70 - 99 mg/dL 122   101R, CM    Comment:    If the patient is fasting, the ADA then defines impaired fasting glucose as > 100 mg/dL and diabetes as > or equal to 123 mg/dL  Specimen collection should occur prior to Sulfasalazine administration due to the potential for falsely depressed results  Specimen collection should occur prior to Sulfapyridine administration due to the potential for falsely elevated results  Calcium 8 4 - 10 2 mg/dL 9 4  9 1R     eGFR >60 ml/min/1 73sq m 100  104R    Narrative       National Kidney Disease Education Program recommendations are as follows:  GFR calculation is accurate only with a steady state creatinine  Chronic Kidney disease less than 60 ml/min/1 73 sq  meters  Kidney failure less than 15 ml/min/1 73 sq  meters  Specimen Collected: 10/28/18 08:00 Last Resulted: 10/28/18 08:17              CM=Additional comments  R=Reference range differs from displayed range                EKG NSR    Physical Exam    Airway    Mallampati score: II  TM Distance: >3 FB  Neck ROM: full     Dental       Cardiovascular  Cardiovascular exam normal    Pulmonary  Pulmonary exam normal     Other Findings        Anesthesia Plan  ASA Score- 2     Anesthesia Type- general with ASA Monitors  Additional Monitors:   Airway Plan:     Comment: Pt tolerated anesthesia well for last ECT  No new changes  12/5  Plan Factors-    Induction- intravenous  Postoperative Plan-     Informed Consent- Anesthetic plan and risks discussed with patient  I personally reviewed this patient with the CRNA  Discussed and agreed on the Anesthesia Plan with the CRNA  Robin Dennison reviewed  No new changes  Recent lab reviewed   Plan discussed

## 2018-12-05 ENCOUNTER — HOSPITAL ENCOUNTER (OUTPATIENT)
Dept: PREOP/PACU | Facility: HOSPITAL | Age: 48
Setting detail: OUTPATIENT SURGERY
Discharge: HOME | End: 2018-12-05
Payer: COMMERCIAL

## 2018-12-05 ENCOUNTER — ANESTHESIA (OUTPATIENT)
Dept: PREOP/PACU | Facility: HOSPITAL | Age: 48
End: 2018-12-05

## 2018-12-05 VITALS
HEIGHT: 66 IN | TEMPERATURE: 97.8 F | DIASTOLIC BLOOD PRESSURE: 82 MMHG | SYSTOLIC BLOOD PRESSURE: 128 MMHG | OXYGEN SATURATION: 95 % | WEIGHT: 168 LBS | HEART RATE: 91 BPM | RESPIRATION RATE: 18 BRPM | BODY MASS INDEX: 27 KG/M2

## 2018-12-05 PROCEDURE — 90870 ELECTROCONVULSIVE THERAPY: CPT

## 2018-12-05 RX ORDER — LABETALOL HYDROCHLORIDE 5 MG/ML
INJECTION, SOLUTION INTRAVENOUS AS NEEDED
Status: DISCONTINUED | OUTPATIENT
Start: 2018-12-05 | End: 2018-12-05 | Stop reason: SURG

## 2018-12-05 RX ORDER — KETOROLAC TROMETHAMINE 30 MG/ML
INJECTION, SOLUTION INTRAMUSCULAR; INTRAVENOUS AS NEEDED
Status: DISCONTINUED | OUTPATIENT
Start: 2018-12-05 | End: 2018-12-05 | Stop reason: SURG

## 2018-12-05 RX ORDER — SODIUM CHLORIDE 9 MG/ML
50 INJECTION, SOLUTION INTRAVENOUS CONTINUOUS
Status: DISCONTINUED | OUTPATIENT
Start: 2018-12-06 | End: 2018-12-09 | Stop reason: HOSPADM

## 2018-12-05 RX ORDER — ESMOLOL HYDROCHLORIDE 10 MG/ML
INJECTION INTRAVENOUS AS NEEDED
Status: DISCONTINUED | OUTPATIENT
Start: 2018-12-05 | End: 2018-12-05 | Stop reason: SURG

## 2018-12-05 RX ORDER — SUCCINYLCHOLINE CHLORIDE 20 MG/ML
INJECTION INTRAMUSCULAR; INTRAVENOUS AS NEEDED
Status: DISCONTINUED | OUTPATIENT
Start: 2018-12-05 | End: 2018-12-05 | Stop reason: SURG

## 2018-12-05 RX ADMIN — Medication 120 MG: at 06:50

## 2018-12-05 RX ADMIN — SODIUM CHLORIDE: 9 INJECTION, SOLUTION INTRAVENOUS at 06:30

## 2018-12-05 RX ADMIN — LABETALOL HYDROCHLORIDE 10 MG: 5 INJECTION, SOLUTION INTRAVENOUS at 07:00

## 2018-12-05 RX ADMIN — KETOROLAC TROMETHAMINE 30 MG: 30 INJECTION, SOLUTION INTRAMUSCULAR; INTRAVENOUS at 06:45

## 2018-12-05 RX ADMIN — SODIUM CHLORIDE 50 ML/HR: 9 INJECTION, SOLUTION INTRAVENOUS at 05:45

## 2018-12-05 RX ADMIN — SUCCINYLCHOLINE CHLORIDE 80 MG: 20 INJECTION, SOLUTION INTRAMUSCULAR; INTRAVENOUS at 06:50

## 2018-12-05 RX ADMIN — ESMOLOL HYDROCHLORIDE 50 MG: 10 INJECTION INTRAVENOUS at 06:50

## 2018-12-05 NOTE — NURSING NOTE
Returned from PACU at 0720 after ECT treatment  Alert and awake  Denies headache or muscle pain  Respirations easy and non labored  IV fluids continue  Call bell in reach

## 2018-12-05 NOTE — DISCHARGE INSTRUCTIONS
Electroconvulsive Therapy   WHAT YOU NEED TO KNOW:   Electroconvulsive therapy (ECT) is a treatment that sends a small electric current to your brain to cause a seizure  The seizure affects the chemicals in your brain, which may make your brain cells work better  ECT is used to treat certain conditions, such as depression, that do not get better after medicines or other therapies have been tried  DISCHARGE INSTRUCTIONS:   Medicines:   · Antidepressants: You may be given this medicine to help decrease or prevent symptoms of depression  · Take your medicine as directed  Contact your healthcare provider if you think your medicine is not helping or if you have side effects  Tell him or her if you are allergic to any medicine  Keep a list of the medicines, vitamins, and herbs you take  Include the amounts, and when and why you take them  Bring the list or the pill bottles to follow-up visits  Carry your medicine list with you in case of an emergency  Follow up with your healthcare provider as directed:  Write down your questions so you remember to ask them during your visits  Memory loss and headaches:   · ECT may cause memory loss and confusion  Your confusion may go away in a short time, such as 1 hour after your treatment  You may lose your memory for 1 to 3 weeks, and some memories may be lost forever  · You may also get a headache after an ECT treatment  These headaches usually only last a short time  If you have a headache after ECT, ask your healthcare provider for medicine to make it go away  If more ECT treatments are planned for you, ask the healthcare provider to give you medicine before the treatments to help prevent headaches  · There is a greater chance that you will fall after ECT treatments  Ask someone to help you when you want to stand up or walk  Ask your healthcare provider for more information on how to prevent falls    Contact your healthcare provider if:   · You have severe pain in your back or neck  · You have questions or concerns about your condition or care  Seek care immediately or call 911 if:   · You have a fever  · You have a severe headache that does not get better, even after you take medicine to treat it  · You have a stiff neck or trouble thinking clearly  · You have feelings of guilt or hopelessness, or thoughts of hurting or killing yourself or others  · You have shortness of breath, chest pain, or a fast heartbeat  © 2017 [address] [facility] Information is for End User's use only and may not be sold, redistributed or otherwise used for commercial purposes  All illustrations and images included in CareNotes® are the copyrighted property of [email or web address]  or Alonso Garcia  The above information is an  only  It is not intended as medical advice for individual conditions or treatments  Talk to your doctor, nurse or pharmacist before following any medical regimen to see if it is safe and effective for you

## 2018-12-05 NOTE — PROCEDURES
ECT Procedure    Patient Name:  Nishi Dial   Medical Record Number: [id number]   YOB: 1970  Date of Procedure: 12/5/2018    Time out was taken with staff to confirm correct patient and correct procedure to be performed      Diagnosis: <principal problem not specified>    Treatment Number: maintenace    ECT Type: Outpatient    Electrode Placement: right unilateral    Post Ictal Suppression Index:  65%    % Energy Set: 80    Seizure Duration (OBS): 17 sec    Additional Notes: increase energy to 100 %    Mee Capone MD

## 2018-12-05 NOTE — NURSING NOTE
Tolerated water  No distress  Just wants to go home  IV removed  Discharge instructions given  Waiting for hospital transportation to take her home

## 2018-12-17 ENCOUNTER — ANESTHESIA EVENT (OUTPATIENT)
Dept: PREOP/PACU | Facility: HOSPITAL | Age: 48
End: 2018-12-17

## 2018-12-17 RX ORDER — SODIUM CHLORIDE 9 MG/ML
75 INJECTION, SOLUTION INTRAVENOUS CONTINUOUS
Status: CANCELLED | OUTPATIENT
Start: 2018-12-18

## 2018-12-18 ENCOUNTER — ANESTHESIA (OUTPATIENT)
Dept: PREOP/PACU | Facility: HOSPITAL | Age: 48
End: 2018-12-18

## 2018-12-18 ENCOUNTER — HOSPITAL ENCOUNTER (OUTPATIENT)
Dept: PREOP/PACU | Facility: HOSPITAL | Age: 48
Setting detail: OUTPATIENT SURGERY
Discharge: HOME | End: 2018-12-18

## 2019-01-21 ENCOUNTER — ANESTHESIA EVENT (OUTPATIENT)
Dept: PREOP/PACU | Facility: HOSPITAL | Age: 49
End: 2019-01-21

## 2019-01-21 RX ORDER — SODIUM CHLORIDE 9 MG/ML
75 INJECTION, SOLUTION INTRAVENOUS CONTINUOUS
Status: CANCELLED | OUTPATIENT
Start: 2019-01-22

## 2019-01-21 NOTE — ANESTHESIA PREPROCEDURE EVALUATION
Review of Systems/Medical History  Patient summary reviewed  Chart reviewed      Cardiovascular  Hyperlipidemia, Hypertension controlled,    Pulmonary  Smoker cigarette smoker  , Tobacco cessation counseling given Cumulative Pack Years: 25, COPD mild- PRN medicaiton , No sleep apnea ,        GI/Hepatic  Negative GI/hepatic ROS          Negative  ROS        Endo/Other  Negative endo/other ROS      GYN      Comment: postmenopausal     Hematology  Negative hematology ROS      Musculoskeletal  Negative musculoskeletal ROS   Arthritis     Neurology  Negative neurology ROS      Psychology   Anxiety, Depression , being treated for depression,              Physical Exam    Airway    Mallampati score: II         Dental       Cardiovascular  Cardiovascular exam normal    Pulmonary  Pulmonary exam normal     Other Findings        Anesthesia Plan  ASA Score- 3     Anesthesia Type- general with ASA Monitors  Additional Monitors:   Airway Plan:     Comment: Chart reviewed and pt states no new changes in health  No prior ECT issues  No questions voiced  Pt re consents to ECT  Plan Factors-Patient not instructed to abstain from smoking on day of procedure  Patient did not smoke on day of surgery  Induction- intravenous  Postoperative Plan-     Informed Consent- Anesthetic plan and risks discussed with patient  I personally reviewed this patient with the CRNA  Discussed and agreed on the Anesthesia Plan with the CRNA  Cade Lemus

## 2019-01-22 ENCOUNTER — ANESTHESIA (OUTPATIENT)
Dept: PREOP/PACU | Facility: HOSPITAL | Age: 49
End: 2019-01-22

## 2019-01-22 ENCOUNTER — HOSPITAL ENCOUNTER (OUTPATIENT)
Dept: PREOP/PACU | Facility: HOSPITAL | Age: 49
Setting detail: OUTPATIENT SURGERY
Discharge: HOME | End: 2019-01-22

## 2019-01-29 ENCOUNTER — HOSPITAL ENCOUNTER (OUTPATIENT)
Dept: PREOP/PACU | Facility: HOSPITAL | Age: 49
Setting detail: OUTPATIENT SURGERY
Discharge: HOME | End: 2019-01-29

## 2019-02-18 ENCOUNTER — ANESTHESIA EVENT (OUTPATIENT)
Dept: PREOP/PACU | Facility: HOSPITAL | Age: 49
End: 2019-02-18

## 2019-02-18 RX ORDER — SODIUM CHLORIDE 9 MG/ML
125 INJECTION, SOLUTION INTRAVENOUS CONTINUOUS
Status: CANCELLED | OUTPATIENT
Start: 2019-02-18

## 2019-02-19 ENCOUNTER — HOSPITAL ENCOUNTER (OUTPATIENT)
Dept: PREOP/PACU | Facility: HOSPITAL | Age: 49
Setting detail: OUTPATIENT SURGERY
Discharge: HOME | End: 2019-02-19

## 2019-02-19 ENCOUNTER — ANESTHESIA (OUTPATIENT)
Dept: PREOP/PACU | Facility: HOSPITAL | Age: 49
End: 2019-02-19

## 2019-05-17 RX ORDER — LISINOPRIL 20 MG/1
TABLET ORAL
COMMUNITY
Start: 2018-12-17 | End: 2019-05-20

## 2019-05-17 RX ORDER — ALPRAZOLAM 1 MG/1
TABLET ORAL
Refills: 0 | COMMUNITY
Start: 2019-02-07 | End: 2019-05-20

## 2019-05-17 RX ORDER — BUTALBITAL, ACETAMINOPHEN AND CAFFEINE 50; 325; 40 MG/1; MG/1; MG/1
TABLET ORAL
Refills: 0 | COMMUNITY
Start: 2019-05-13 | End: 2019-07-03 | Stop reason: SDUPTHER

## 2019-05-17 RX ORDER — BACLOFEN 10 MG/1
10 TABLET ORAL 3 TIMES DAILY PRN
Refills: 1 | COMMUNITY
Start: 2019-05-02 | End: 2019-06-17

## 2019-05-17 RX ORDER — ONDANSETRON 4 MG/1
TABLET, FILM COATED ORAL
Refills: 1 | COMMUNITY
Start: 2019-02-16 | End: 2019-05-20 | Stop reason: SDUPTHER

## 2019-05-17 RX ORDER — OMEPRAZOLE 40 MG/1
40 CAPSULE, DELAYED RELEASE ORAL EVERY MORNING
Refills: 3 | COMMUNITY
Start: 2019-04-10 | End: 2019-05-20 | Stop reason: SDUPTHER

## 2019-05-17 RX ORDER — TRAMADOL HYDROCHLORIDE 50 MG/1
TABLET ORAL
Refills: 0 | COMMUNITY
Start: 2019-04-23 | End: 2019-06-17 | Stop reason: SDUPTHER

## 2019-05-20 ENCOUNTER — OFFICE VISIT (OUTPATIENT)
Dept: FAMILY MEDICINE CLINIC | Facility: CLINIC | Age: 49
End: 2019-05-20
Payer: COMMERCIAL

## 2019-05-20 VITALS
RESPIRATION RATE: 22 BRPM | SYSTOLIC BLOOD PRESSURE: 134 MMHG | HEIGHT: 66 IN | WEIGHT: 167.4 LBS | TEMPERATURE: 96.7 F | HEART RATE: 130 BPM | BODY MASS INDEX: 26.9 KG/M2 | OXYGEN SATURATION: 96 % | DIASTOLIC BLOOD PRESSURE: 86 MMHG

## 2019-05-20 DIAGNOSIS — F33.2 SEVERE EPISODE OF RECURRENT MAJOR DEPRESSIVE DISORDER, WITHOUT PSYCHOTIC FEATURES (HCC): ICD-10-CM

## 2019-05-20 DIAGNOSIS — E66.3 OVERWEIGHT (BMI 25.0-29.9): ICD-10-CM

## 2019-05-20 DIAGNOSIS — I10 ESSENTIAL HYPERTENSION: Primary | ICD-10-CM

## 2019-05-20 DIAGNOSIS — Z12.31 SCREENING MAMMOGRAM, ENCOUNTER FOR: ICD-10-CM

## 2019-05-20 DIAGNOSIS — K21.00 ESOPHAGITIS, REFLUX: ICD-10-CM

## 2019-05-20 DIAGNOSIS — Z72.0 TOBACCO ABUSE: ICD-10-CM

## 2019-05-20 DIAGNOSIS — G89.29 OTHER CHRONIC PAIN: ICD-10-CM

## 2019-05-20 DIAGNOSIS — J44.9 COPD WITHOUT EXACERBATION (HCC): ICD-10-CM

## 2019-05-20 DIAGNOSIS — F41.1 GENERALIZED ANXIETY DISORDER: ICD-10-CM

## 2019-05-20 DIAGNOSIS — Z87.81 HISTORY OF RIB FRACTURE: ICD-10-CM

## 2019-05-20 DIAGNOSIS — F51.01 PRIMARY INSOMNIA: ICD-10-CM

## 2019-05-20 PROBLEM — R63.4 RECENT WEIGHT LOSS: Status: RESOLVED | Noted: 2018-03-09 | Resolved: 2019-05-20

## 2019-05-20 PROBLEM — M22.40 CHONDROMALACIA PATELLAE: Status: ACTIVE | Noted: 2018-04-18

## 2019-05-20 PROBLEM — K52.9 CHRONIC DIARRHEA: Status: RESOLVED | Noted: 2018-01-25 | Resolved: 2019-05-20

## 2019-05-20 PROBLEM — R10.9 ABDOMINAL PAIN: Status: RESOLVED | Noted: 2018-03-09 | Resolved: 2019-05-20

## 2019-05-20 PROCEDURE — 99204 OFFICE O/P NEW MOD 45 MIN: CPT | Performed by: INTERNAL MEDICINE

## 2019-05-20 RX ORDER — ONDANSETRON 4 MG/1
4 TABLET, FILM COATED ORAL DAILY PRN
Qty: 90 TABLET | Refills: 0 | Status: SHIPPED | OUTPATIENT
Start: 2019-05-20 | End: 2019-08-23 | Stop reason: SDUPTHER

## 2019-05-20 RX ORDER — BUDESONIDE AND FORMOTEROL FUMARATE DIHYDRATE 160; 4.5 UG/1; UG/1
2 AEROSOL RESPIRATORY (INHALATION) 2 TIMES DAILY
Qty: 1 INHALER | Refills: 3 | Status: SHIPPED | OUTPATIENT
Start: 2019-05-20 | End: 2019-06-16 | Stop reason: SDUPTHER

## 2019-05-20 RX ORDER — LISINOPRIL 20 MG/1
20 TABLET ORAL DAILY
Qty: 90 TABLET | Refills: 0 | Status: SHIPPED | OUTPATIENT
Start: 2019-05-20 | End: 2019-05-21

## 2019-05-20 RX ORDER — ZOLPIDEM TARTRATE 10 MG/1
10 TABLET ORAL
Qty: 30 TABLET | Refills: 1 | Status: SHIPPED | OUTPATIENT
Start: 2019-05-20 | End: 2019-07-29 | Stop reason: ALTCHOICE

## 2019-05-20 RX ORDER — OMEPRAZOLE 40 MG/1
40 CAPSULE, DELAYED RELEASE ORAL EVERY MORNING
Qty: 90 CAPSULE | Refills: 0 | Status: SHIPPED | OUTPATIENT
Start: 2019-05-20 | End: 2019-08-15 | Stop reason: SDUPTHER

## 2019-05-21 RX ORDER — LISINOPRIL 20 MG/1
20 TABLET ORAL DAILY
Qty: 90 TABLET | Refills: 0
Start: 2019-05-21 | End: 2019-12-16 | Stop reason: SDUPTHER

## 2019-05-29 ENCOUNTER — APPOINTMENT (EMERGENCY)
Dept: CT IMAGING | Facility: HOSPITAL | Age: 49
DRG: 394 | End: 2019-05-29
Payer: COMMERCIAL

## 2019-05-29 ENCOUNTER — HOSPITAL ENCOUNTER (INPATIENT)
Facility: HOSPITAL | Age: 49
LOS: 5 days | Discharge: HOME | DRG: 394 | End: 2019-06-03
Attending: FAMILY MEDICINE | Admitting: HOSPITALIST
Payer: COMMERCIAL

## 2019-05-29 DIAGNOSIS — K52.9 ACUTE COLITIS: Primary | ICD-10-CM

## 2019-05-29 DIAGNOSIS — K92.1 HEMATOCHEZIA: ICD-10-CM

## 2019-05-29 PROBLEM — A41.9 SEPSIS (HCC): Status: ACTIVE | Noted: 2019-05-29

## 2019-05-29 LAB
ALBUMIN SERPL BCP-MCNC: 3.6 G/DL (ref 3.5–5)
ALP SERPL-CCNC: 120 U/L (ref 46–116)
ALT SERPL W P-5'-P-CCNC: 21 U/L (ref 12–78)
ANION GAP SERPL CALCULATED.3IONS-SCNC: 10 MMOL/L (ref 4–13)
AST SERPL W P-5'-P-CCNC: 21 U/L (ref 5–45)
BACTERIA UR QL AUTO: ABNORMAL /HPF
BASOPHILS # BLD AUTO: 0.07 THOUSANDS/ΜL (ref 0–0.1)
BASOPHILS NFR BLD AUTO: 0 % (ref 0–1)
BILIRUB SERPL-MCNC: 0.51 MG/DL (ref 0.2–1)
BILIRUB UR QL STRIP: NEGATIVE
BUN SERPL-MCNC: 13 MG/DL (ref 5–25)
CALCIUM SERPL-MCNC: 9.7 MG/DL (ref 8.3–10.1)
CHLORIDE SERPL-SCNC: 97 MMOL/L (ref 100–108)
CLARITY UR: CLEAR
CO2 SERPL-SCNC: 27 MMOL/L (ref 21–32)
COLOR UR: YELLOW
CREAT SERPL-MCNC: 1.16 MG/DL (ref 0.6–1.3)
EOSINOPHIL # BLD AUTO: 0.02 THOUSAND/ΜL (ref 0–0.61)
EOSINOPHIL NFR BLD AUTO: 0 % (ref 0–6)
ERYTHROCYTE [DISTWIDTH] IN BLOOD BY AUTOMATED COUNT: 14.6 % (ref 11.6–15.1)
EXT PREG TEST URINE: NEGATIVE
GFR SERPL CREATININE-BSD FRML MDRD: 55 ML/MIN/1.73SQ M
GLUCOSE SERPL-MCNC: 150 MG/DL (ref 65–140)
GLUCOSE UR STRIP-MCNC: NEGATIVE MG/DL
HCT VFR BLD AUTO: 46.7 % (ref 34.8–46.1)
HEMOCCULT STL QL: POSITIVE
HGB BLD-MCNC: 15 G/DL (ref 11.5–15.4)
HGB UR QL STRIP.AUTO: ABNORMAL
IMM GRANULOCYTES # BLD AUTO: 0.09 THOUSAND/UL (ref 0–0.2)
IMM GRANULOCYTES NFR BLD AUTO: 1 % (ref 0–2)
KETONES UR STRIP-MCNC: NEGATIVE MG/DL
LACTATE SERPL-SCNC: 1.6 MMOL/L (ref 0.5–2)
LEUKOCYTE ESTERASE UR QL STRIP: NEGATIVE
LIPASE SERPL-CCNC: 49 U/L (ref 73–393)
LYMPHOCYTES # BLD AUTO: 1.6 THOUSANDS/ΜL (ref 0.6–4.47)
LYMPHOCYTES NFR BLD AUTO: 8 % (ref 14–44)
MCH RBC QN AUTO: 31.8 PG (ref 26.8–34.3)
MCHC RBC AUTO-ENTMCNC: 32.1 G/DL (ref 31.4–37.4)
MCV RBC AUTO: 99 FL (ref 82–98)
MONOCYTES # BLD AUTO: 0.89 THOUSAND/ΜL (ref 0.17–1.22)
MONOCYTES NFR BLD AUTO: 5 % (ref 4–12)
NEUTROPHILS # BLD AUTO: 16.28 THOUSANDS/ΜL (ref 1.85–7.62)
NEUTS SEG NFR BLD AUTO: 86 % (ref 43–75)
NITRITE UR QL STRIP: NEGATIVE
NON-SQ EPI CELLS URNS QL MICRO: ABNORMAL /HPF
NRBC BLD AUTO-RTO: 0 /100 WBCS
PH UR STRIP.AUTO: 7.5 [PH] (ref 4.5–8)
PLATELET # BLD AUTO: 341 THOUSANDS/UL (ref 149–390)
PMV BLD AUTO: 9.9 FL (ref 8.9–12.7)
POTASSIUM SERPL-SCNC: 4.2 MMOL/L (ref 3.5–5.3)
PROT SERPL-MCNC: 7.7 G/DL (ref 6.4–8.2)
PROT UR STRIP-MCNC: NEGATIVE MG/DL
RBC # BLD AUTO: 4.72 MILLION/UL (ref 3.81–5.12)
RBC #/AREA URNS AUTO: ABNORMAL /HPF
SODIUM SERPL-SCNC: 134 MMOL/L (ref 136–145)
SP GR UR STRIP.AUTO: 1.01 (ref 1–1.03)
UROBILINOGEN UR QL STRIP.AUTO: 0.2 E.U./DL
WBC # BLD AUTO: 18.95 THOUSAND/UL (ref 4.31–10.16)
WBC #/AREA URNS AUTO: ABNORMAL /HPF

## 2019-05-29 PROCEDURE — 99285 EMERGENCY DEPT VISIT HI MDM: CPT

## 2019-05-29 PROCEDURE — 81001 URINALYSIS AUTO W/SCOPE: CPT

## 2019-05-29 PROCEDURE — C9113 INJ PANTOPRAZOLE SODIUM, VIA: HCPCS | Performed by: FAMILY MEDICINE

## 2019-05-29 PROCEDURE — 96376 TX/PRO/DX INJ SAME DRUG ADON: CPT

## 2019-05-29 PROCEDURE — 85025 COMPLETE CBC W/AUTO DIFF WBC: CPT | Performed by: EMERGENCY MEDICINE

## 2019-05-29 PROCEDURE — 82272 OCCULT BLD FECES 1-3 TESTS: CPT | Performed by: FAMILY MEDICINE

## 2019-05-29 PROCEDURE — 36415 COLL VENOUS BLD VENIPUNCTURE: CPT | Performed by: EMERGENCY MEDICINE

## 2019-05-29 PROCEDURE — 87040 BLOOD CULTURE FOR BACTERIA: CPT | Performed by: PHYSICIAN ASSISTANT

## 2019-05-29 PROCEDURE — 99285 EMERGENCY DEPT VISIT HI MDM: CPT | Performed by: PHYSICIAN ASSISTANT

## 2019-05-29 PROCEDURE — 83690 ASSAY OF LIPASE: CPT | Performed by: EMERGENCY MEDICINE

## 2019-05-29 PROCEDURE — 80053 COMPREHEN METABOLIC PANEL: CPT | Performed by: EMERGENCY MEDICINE

## 2019-05-29 PROCEDURE — 99223 1ST HOSP IP/OBS HIGH 75: CPT | Performed by: FAMILY MEDICINE

## 2019-05-29 PROCEDURE — 83605 ASSAY OF LACTIC ACID: CPT | Performed by: EMERGENCY MEDICINE

## 2019-05-29 PROCEDURE — 96361 HYDRATE IV INFUSION ADD-ON: CPT

## 2019-05-29 PROCEDURE — 96374 THER/PROPH/DIAG INJ IV PUSH: CPT

## 2019-05-29 PROCEDURE — 81025 URINE PREGNANCY TEST: CPT | Performed by: PHYSICIAN ASSISTANT

## 2019-05-29 PROCEDURE — 74177 CT ABD & PELVIS W/CONTRAST: CPT

## 2019-05-29 PROCEDURE — 96375 TX/PRO/DX INJ NEW DRUG ADDON: CPT

## 2019-05-29 PROCEDURE — 99254 IP/OBS CNSLTJ NEW/EST MOD 60: CPT | Performed by: INTERNAL MEDICINE

## 2019-05-29 RX ORDER — BUDESONIDE AND FORMOTEROL FUMARATE DIHYDRATE 160; 4.5 UG/1; UG/1
2 AEROSOL RESPIRATORY (INHALATION) 2 TIMES DAILY
Status: DISCONTINUED | OUTPATIENT
Start: 2019-05-29 | End: 2019-06-03 | Stop reason: HOSPADM

## 2019-05-29 RX ORDER — GABAPENTIN 400 MG/1
400 CAPSULE ORAL 3 TIMES DAILY
Status: DISCONTINUED | OUTPATIENT
Start: 2019-05-29 | End: 2019-06-03 | Stop reason: HOSPADM

## 2019-05-29 RX ORDER — LEVOFLOXACIN 5 MG/ML
750 INJECTION, SOLUTION INTRAVENOUS ONCE
Status: COMPLETED | OUTPATIENT
Start: 2019-05-29 | End: 2019-05-29

## 2019-05-29 RX ORDER — NICOTINE 21 MG/24HR
1 PATCH, TRANSDERMAL 24 HOURS TRANSDERMAL DAILY
Status: DISCONTINUED | OUTPATIENT
Start: 2019-05-29 | End: 2019-06-03 | Stop reason: HOSPADM

## 2019-05-29 RX ORDER — HYDROMORPHONE HCL/PF 1 MG/ML
0.5 SYRINGE (ML) INJECTION EVERY 4 HOURS PRN
Status: DISCONTINUED | OUTPATIENT
Start: 2019-05-29 | End: 2019-05-30

## 2019-05-29 RX ORDER — ZOLPIDEM TARTRATE 5 MG/1
10 TABLET ORAL
Status: DISCONTINUED | OUTPATIENT
Start: 2019-05-29 | End: 2019-06-03 | Stop reason: HOSPADM

## 2019-05-29 RX ORDER — BUTALBITAL, ACETAMINOPHEN AND CAFFEINE 50; 325; 40 MG/1; MG/1; MG/1
1 TABLET ORAL EVERY 6 HOURS PRN
Status: DISCONTINUED | OUTPATIENT
Start: 2019-05-29 | End: 2019-06-03 | Stop reason: HOSPADM

## 2019-05-29 RX ORDER — PANTOPRAZOLE SODIUM 40 MG/1
40 INJECTION, POWDER, FOR SOLUTION INTRAVENOUS EVERY 12 HOURS SCHEDULED
Status: DISCONTINUED | OUTPATIENT
Start: 2019-05-29 | End: 2019-06-01

## 2019-05-29 RX ORDER — ONDANSETRON 2 MG/ML
4 INJECTION INTRAMUSCULAR; INTRAVENOUS ONCE
Status: COMPLETED | OUTPATIENT
Start: 2019-05-29 | End: 2019-05-29

## 2019-05-29 RX ORDER — SODIUM CHLORIDE 9 MG/ML
125 INJECTION, SOLUTION INTRAVENOUS CONTINUOUS
Status: DISCONTINUED | OUTPATIENT
Start: 2019-05-29 | End: 2019-05-31 | Stop reason: SDUPTHER

## 2019-05-29 RX ORDER — HYDROMORPHONE HCL/PF 1 MG/ML
0.5 SYRINGE (ML) INJECTION ONCE
Status: COMPLETED | OUTPATIENT
Start: 2019-05-29 | End: 2019-05-29

## 2019-05-29 RX ORDER — BACLOFEN 10 MG/1
10 TABLET ORAL 3 TIMES DAILY PRN
Status: DISCONTINUED | OUTPATIENT
Start: 2019-05-29 | End: 2019-06-03 | Stop reason: HOSPADM

## 2019-05-29 RX ORDER — ONDANSETRON 2 MG/ML
4 INJECTION INTRAMUSCULAR; INTRAVENOUS EVERY 6 HOURS PRN
Status: DISCONTINUED | OUTPATIENT
Start: 2019-05-29 | End: 2019-06-03 | Stop reason: HOSPADM

## 2019-05-29 RX ORDER — LISINOPRIL 20 MG/1
20 TABLET ORAL DAILY
Status: DISCONTINUED | OUTPATIENT
Start: 2019-05-29 | End: 2019-06-03 | Stop reason: HOSPADM

## 2019-05-29 RX ORDER — LEVOFLOXACIN 5 MG/ML
750 INJECTION, SOLUTION INTRAVENOUS EVERY 24 HOURS
Status: DISCONTINUED | OUTPATIENT
Start: 2019-05-30 | End: 2019-05-30

## 2019-05-29 RX ORDER — MIRTAZAPINE 15 MG/1
30 TABLET, FILM COATED ORAL
Status: DISCONTINUED | OUTPATIENT
Start: 2019-05-29 | End: 2019-06-03 | Stop reason: HOSPADM

## 2019-05-29 RX ORDER — VENLAFAXINE HYDROCHLORIDE 150 MG/1
150 CAPSULE, EXTENDED RELEASE ORAL DAILY
Status: DISCONTINUED | OUTPATIENT
Start: 2019-05-29 | End: 2019-06-03 | Stop reason: HOSPADM

## 2019-05-29 RX ORDER — TRAMADOL HYDROCHLORIDE 50 MG/1
50 TABLET ORAL EVERY 6 HOURS PRN
Status: DISCONTINUED | OUTPATIENT
Start: 2019-05-29 | End: 2019-06-02

## 2019-05-29 RX ADMIN — GABAPENTIN 400 MG: 400 CAPSULE ORAL at 10:37

## 2019-05-29 RX ADMIN — SODIUM CHLORIDE 1000 ML: 0.9 INJECTION, SOLUTION INTRAVENOUS at 06:17

## 2019-05-29 RX ADMIN — IOHEXOL 100 ML: 350 INJECTION, SOLUTION INTRAVENOUS at 06:45

## 2019-05-29 RX ADMIN — PANTOPRAZOLE SODIUM 40 MG: 40 INJECTION, POWDER, FOR SOLUTION INTRAVENOUS at 21:40

## 2019-05-29 RX ADMIN — BACLOFEN 10 MG: 10 TABLET ORAL at 21:40

## 2019-05-29 RX ADMIN — HYDROMORPHONE HYDROCHLORIDE 0.5 MG: 1 INJECTION, SOLUTION INTRAMUSCULAR; INTRAVENOUS; SUBCUTANEOUS at 19:17

## 2019-05-29 RX ADMIN — NICOTINE 1 PATCH: 21 PATCH, EXTENDED RELEASE TRANSDERMAL at 10:36

## 2019-05-29 RX ADMIN — BUDESONIDE AND FORMOTEROL FUMARATE DIHYDRATE 2 PUFF: 160; 4.5 AEROSOL RESPIRATORY (INHALATION) at 21:40

## 2019-05-29 RX ADMIN — BACLOFEN 10 MG: 10 TABLET ORAL at 10:56

## 2019-05-29 RX ADMIN — METRONIDAZOLE 500 MG: 500 INJECTION, SOLUTION INTRAVENOUS at 15:17

## 2019-05-29 RX ADMIN — HYDROMORPHONE HYDROCHLORIDE 0.5 MG: 1 INJECTION, SOLUTION INTRAMUSCULAR; INTRAVENOUS; SUBCUTANEOUS at 10:36

## 2019-05-29 RX ADMIN — PANTOPRAZOLE SODIUM 40 MG: 40 INJECTION, POWDER, FOR SOLUTION INTRAVENOUS at 10:37

## 2019-05-29 RX ADMIN — POLYETHYLENE GLYCOL 3350, SODIUM SULFATE ANHYDROUS, SODIUM BICARBONATE, SODIUM CHLORIDE, POTASSIUM CHLORIDE 4000 ML: 236; 22.74; 6.74; 5.86; 2.97 POWDER, FOR SOLUTION ORAL at 16:48

## 2019-05-29 RX ADMIN — LISINOPRIL 20 MG: 20 TABLET ORAL at 10:37

## 2019-05-29 RX ADMIN — BISACODYL 10 MG: 5 TABLET, COATED ORAL at 16:48

## 2019-05-29 RX ADMIN — MIRTAZAPINE 30 MG: 15 TABLET, FILM COATED ORAL at 21:40

## 2019-05-29 RX ADMIN — VENLAFAXINE HYDROCHLORIDE 150 MG: 150 CAPSULE, EXTENDED RELEASE ORAL at 10:37

## 2019-05-29 RX ADMIN — SODIUM CHLORIDE 125 ML/HR: 0.9 INJECTION, SOLUTION INTRAVENOUS at 10:37

## 2019-05-29 RX ADMIN — GABAPENTIN 400 MG: 400 CAPSULE ORAL at 15:19

## 2019-05-29 RX ADMIN — HYDROMORPHONE HYDROCHLORIDE 0.5 MG: 1 INJECTION, SOLUTION INTRAMUSCULAR; INTRAVENOUS; SUBCUTANEOUS at 07:27

## 2019-05-29 RX ADMIN — ZOLPIDEM TARTRATE 10 MG: 5 TABLET, COATED ORAL at 21:40

## 2019-05-29 RX ADMIN — MORPHINE SULFATE 2 MG: 2 INJECTION, SOLUTION INTRAMUSCULAR; INTRAVENOUS at 06:51

## 2019-05-29 RX ADMIN — MORPHINE SULFATE 2 MG: 2 INJECTION, SOLUTION INTRAMUSCULAR; INTRAVENOUS at 06:18

## 2019-05-29 RX ADMIN — GABAPENTIN 400 MG: 400 CAPSULE ORAL at 21:40

## 2019-05-29 RX ADMIN — SODIUM CHLORIDE 125 ML/HR: 0.9 INJECTION, SOLUTION INTRAVENOUS at 18:26

## 2019-05-29 RX ADMIN — HYDROMORPHONE HYDROCHLORIDE 0.5 MG: 1 INJECTION, SOLUTION INTRAMUSCULAR; INTRAVENOUS; SUBCUTANEOUS at 08:01

## 2019-05-29 RX ADMIN — METRONIDAZOLE 500 MG: 500 INJECTION, SOLUTION INTRAVENOUS at 07:38

## 2019-05-29 RX ADMIN — HYDROMORPHONE HYDROCHLORIDE 0.5 MG: 1 INJECTION, SOLUTION INTRAMUSCULAR; INTRAVENOUS; SUBCUTANEOUS at 23:14

## 2019-05-29 RX ADMIN — METRONIDAZOLE 500 MG: 500 INJECTION, SOLUTION INTRAVENOUS at 23:14

## 2019-05-29 RX ADMIN — ONDANSETRON 4 MG: 2 INJECTION INTRAMUSCULAR; INTRAVENOUS at 06:17

## 2019-05-29 RX ADMIN — BUDESONIDE AND FORMOTEROL FUMARATE DIHYDRATE 2 PUFF: 160; 4.5 AEROSOL RESPIRATORY (INHALATION) at 10:56

## 2019-05-29 RX ADMIN — LEVOFLOXACIN 750 MG: 5 INJECTION, SOLUTION INTRAVENOUS at 08:20

## 2019-05-29 RX ADMIN — HYDROMORPHONE HYDROCHLORIDE 0.5 MG: 1 INJECTION, SOLUTION INTRAMUSCULAR; INTRAVENOUS; SUBCUTANEOUS at 15:16

## 2019-05-29 RX ADMIN — TRAMADOL HYDROCHLORIDE 50 MG: 50 TABLET, COATED ORAL at 13:47

## 2019-05-30 ENCOUNTER — APPOINTMENT (INPATIENT)
Dept: RADIOLOGY | Facility: HOSPITAL | Age: 49
DRG: 394 | End: 2019-05-30
Payer: COMMERCIAL

## 2019-05-30 ENCOUNTER — APPOINTMENT (OUTPATIENT)
Dept: GASTROENTEROLOGY | Facility: HOSPITAL | Age: 49
DRG: 394 | End: 2019-05-30
Payer: COMMERCIAL

## 2019-05-30 ENCOUNTER — ANESTHESIA EVENT (INPATIENT)
Dept: GASTROENTEROLOGY | Facility: HOSPITAL | Age: 49
DRG: 394 | End: 2019-05-30
Payer: COMMERCIAL

## 2019-05-30 ENCOUNTER — ANESTHESIA (INPATIENT)
Dept: GASTROENTEROLOGY | Facility: HOSPITAL | Age: 49
DRG: 394 | End: 2019-05-30
Payer: COMMERCIAL

## 2019-05-30 PROBLEM — K55.039 ACUTE ISCHEMIC COLITIS (HCC): Status: ACTIVE | Noted: 2019-05-29

## 2019-05-30 PROBLEM — R65.10 SIRS (SYSTEMIC INFLAMMATORY RESPONSE SYNDROME) (HCC): Status: ACTIVE | Noted: 2019-05-29

## 2019-05-30 LAB
ANION GAP SERPL CALCULATED.3IONS-SCNC: 8 MMOL/L (ref 4–13)
BASOPHILS # BLD AUTO: 0.06 THOUSANDS/ΜL (ref 0–0.1)
BASOPHILS NFR BLD AUTO: 0 % (ref 0–1)
BUN SERPL-MCNC: 7 MG/DL (ref 5–25)
CALCIUM SERPL-MCNC: 8.9 MG/DL (ref 8.3–10.1)
CHLORIDE SERPL-SCNC: 103 MMOL/L (ref 100–108)
CO2 SERPL-SCNC: 27 MMOL/L (ref 21–32)
CREAT SERPL-MCNC: 0.8 MG/DL (ref 0.6–1.3)
EOSINOPHIL # BLD AUTO: 0.04 THOUSAND/ΜL (ref 0–0.61)
EOSINOPHIL NFR BLD AUTO: 0 % (ref 0–6)
ERYTHROCYTE [DISTWIDTH] IN BLOOD BY AUTOMATED COUNT: 15 % (ref 11.6–15.1)
GFR SERPL CREATININE-BSD FRML MDRD: 86 ML/MIN/1.73SQ M
GLUCOSE SERPL-MCNC: 104 MG/DL (ref 65–140)
HCT VFR BLD AUTO: 41.7 % (ref 34.8–46.1)
HGB BLD-MCNC: 13.1 G/DL (ref 11.5–15.4)
IMM GRANULOCYTES # BLD AUTO: 0.05 THOUSAND/UL (ref 0–0.2)
IMM GRANULOCYTES NFR BLD AUTO: 0 % (ref 0–2)
LYMPHOCYTES # BLD AUTO: 2.49 THOUSANDS/ΜL (ref 0.6–4.47)
LYMPHOCYTES NFR BLD AUTO: 16 % (ref 14–44)
MAGNESIUM SERPL-MCNC: 1.8 MG/DL (ref 1.6–2.6)
MCH RBC QN AUTO: 31.8 PG (ref 26.8–34.3)
MCHC RBC AUTO-ENTMCNC: 31.4 G/DL (ref 31.4–37.4)
MCV RBC AUTO: 101 FL (ref 82–98)
MONOCYTES # BLD AUTO: 0.77 THOUSAND/ΜL (ref 0.17–1.22)
MONOCYTES NFR BLD AUTO: 5 % (ref 4–12)
NEUTROPHILS # BLD AUTO: 12.05 THOUSANDS/ΜL (ref 1.85–7.62)
NEUTS SEG NFR BLD AUTO: 79 % (ref 43–75)
NRBC BLD AUTO-RTO: 0 /100 WBCS
PLATELET # BLD AUTO: 256 THOUSANDS/UL (ref 149–390)
PMV BLD AUTO: 10 FL (ref 8.9–12.7)
POTASSIUM SERPL-SCNC: 3.8 MMOL/L (ref 3.5–5.3)
RBC # BLD AUTO: 4.12 MILLION/UL (ref 3.81–5.12)
SODIUM SERPL-SCNC: 138 MMOL/L (ref 136–145)
WBC # BLD AUTO: 15.46 THOUSAND/UL (ref 4.31–10.16)

## 2019-05-30 PROCEDURE — 80048 BASIC METABOLIC PNL TOTAL CA: CPT | Performed by: FAMILY MEDICINE

## 2019-05-30 PROCEDURE — 0DBM8ZX EXCISION OF DESCENDING COLON, VIA NATURAL OR ARTIFICIAL OPENING ENDOSCOPIC, DIAGNOSTIC: ICD-10-PCS | Performed by: INTERNAL MEDICINE

## 2019-05-30 PROCEDURE — 45380 COLONOSCOPY AND BIOPSY: CPT | Performed by: INTERNAL MEDICINE

## 2019-05-30 PROCEDURE — 0DBG8ZX EXCISION OF LEFT LARGE INTESTINE, VIA NATURAL OR ARTIFICIAL OPENING ENDOSCOPIC, DIAGNOSTIC: ICD-10-PCS | Performed by: INTERNAL MEDICINE

## 2019-05-30 PROCEDURE — 88305 TISSUE EXAM BY PATHOLOGIST: CPT | Performed by: PATHOLOGY

## 2019-05-30 PROCEDURE — 99232 SBSQ HOSP IP/OBS MODERATE 35: CPT | Performed by: PHYSICIAN ASSISTANT

## 2019-05-30 PROCEDURE — 85025 COMPLETE CBC W/AUTO DIFF WBC: CPT | Performed by: FAMILY MEDICINE

## 2019-05-30 PROCEDURE — 83735 ASSAY OF MAGNESIUM: CPT | Performed by: FAMILY MEDICINE

## 2019-05-30 PROCEDURE — C9113 INJ PANTOPRAZOLE SODIUM, VIA: HCPCS | Performed by: FAMILY MEDICINE

## 2019-05-30 PROCEDURE — C9113 INJ PANTOPRAZOLE SODIUM, VIA: HCPCS | Performed by: INTERNAL MEDICINE

## 2019-05-30 PROCEDURE — 74022 RADEX COMPL AQT ABD SERIES: CPT

## 2019-05-30 PROCEDURE — 99222 1ST HOSP IP/OBS MODERATE 55: CPT | Performed by: PHYSICIAN ASSISTANT

## 2019-05-30 RX ORDER — SODIUM CHLORIDE 9 MG/ML
125 INJECTION, SOLUTION INTRAVENOUS CONTINUOUS
Status: DISCONTINUED | OUTPATIENT
Start: 2019-05-30 | End: 2019-05-31 | Stop reason: SDUPTHER

## 2019-05-30 RX ORDER — KETOROLAC TROMETHAMINE 30 MG/ML
15 INJECTION, SOLUTION INTRAMUSCULAR; INTRAVENOUS EVERY 6 HOURS SCHEDULED
Status: COMPLETED | OUTPATIENT
Start: 2019-05-30 | End: 2019-05-31

## 2019-05-30 RX ORDER — HYDROMORPHONE HCL/PF 1 MG/ML
1 SYRINGE (ML) INJECTION
Status: DISCONTINUED | OUTPATIENT
Start: 2019-05-30 | End: 2019-06-02

## 2019-05-30 RX ORDER — PROPOFOL 10 MG/ML
INJECTION, EMULSION INTRAVENOUS AS NEEDED
Status: DISCONTINUED | OUTPATIENT
Start: 2019-05-30 | End: 2019-05-30 | Stop reason: SURG

## 2019-05-30 RX ORDER — HYDROMORPHONE HCL/PF 1 MG/ML
0.5 SYRINGE (ML) INJECTION
Status: DISCONTINUED | OUTPATIENT
Start: 2019-05-30 | End: 2019-06-02

## 2019-05-30 RX ORDER — CIPROFLOXACIN 2 MG/ML
400 INJECTION, SOLUTION INTRAVENOUS EVERY 12 HOURS
Status: DISCONTINUED | OUTPATIENT
Start: 2019-05-30 | End: 2019-05-30

## 2019-05-30 RX ORDER — HYDROMORPHONE HCL/PF 1 MG/ML
0.5 SYRINGE (ML) INJECTION EVERY 4 HOURS PRN
Status: DISCONTINUED | OUTPATIENT
Start: 2019-05-30 | End: 2019-05-30

## 2019-05-30 RX ORDER — SODIUM CHLORIDE 9 MG/ML
75 INJECTION, SOLUTION INTRAVENOUS CONTINUOUS
Status: DISCONTINUED | OUTPATIENT
Start: 2019-05-30 | End: 2019-06-02

## 2019-05-30 RX ADMIN — SODIUM CHLORIDE 125 ML/HR: 0.9 INJECTION, SOLUTION INTRAVENOUS at 17:12

## 2019-05-30 RX ADMIN — HYDROMORPHONE HYDROCHLORIDE 0.5 MG: 1 INJECTION, SOLUTION INTRAMUSCULAR; INTRAVENOUS; SUBCUTANEOUS at 12:40

## 2019-05-30 RX ADMIN — PROPOFOL 100 MG: 10 INJECTION, EMULSION INTRAVENOUS at 16:18

## 2019-05-30 RX ADMIN — HYDROMORPHONE HYDROCHLORIDE 0.5 MG: 1 INJECTION, SOLUTION INTRAMUSCULAR; INTRAVENOUS; SUBCUTANEOUS at 22:04

## 2019-05-30 RX ADMIN — SODIUM CHLORIDE 125 ML/HR: 0.9 INJECTION, SOLUTION INTRAVENOUS at 14:46

## 2019-05-30 RX ADMIN — METRONIDAZOLE 500 MG: 500 INJECTION, SOLUTION INTRAVENOUS at 17:12

## 2019-05-30 RX ADMIN — PROPOFOL 100 MG: 10 INJECTION, EMULSION INTRAVENOUS at 16:15

## 2019-05-30 RX ADMIN — BUTALBITAL, ACETAMINOPHEN AND CAFFEINE 1 TABLET: 50; 325; 40 TABLET ORAL at 21:55

## 2019-05-30 RX ADMIN — LEVOFLOXACIN 750 MG: 5 INJECTION, SOLUTION INTRAVENOUS at 09:22

## 2019-05-30 RX ADMIN — METRONIDAZOLE 500 MG: 500 INJECTION, SOLUTION INTRAVENOUS at 07:45

## 2019-05-30 RX ADMIN — BUTALBITAL, ACETAMINOPHEN AND CAFFEINE 1 TABLET: 50; 325; 40 TABLET ORAL at 03:02

## 2019-05-30 RX ADMIN — GABAPENTIN 400 MG: 400 CAPSULE ORAL at 09:22

## 2019-05-30 RX ADMIN — BUDESONIDE AND FORMOTEROL FUMARATE DIHYDRATE 2 PUFF: 160; 4.5 AEROSOL RESPIRATORY (INHALATION) at 09:27

## 2019-05-30 RX ADMIN — PIPERACILLIN SODIUM AND TAZOBACTAM SODIUM 3.38 G: 36; 4.5 INJECTION, POWDER, FOR SOLUTION INTRAVENOUS at 20:06

## 2019-05-30 RX ADMIN — GABAPENTIN 400 MG: 400 CAPSULE ORAL at 21:55

## 2019-05-30 RX ADMIN — SODIUM CHLORIDE 125 ML/HR: 0.9 INJECTION, SOLUTION INTRAVENOUS at 03:01

## 2019-05-30 RX ADMIN — LISINOPRIL 20 MG: 20 TABLET ORAL at 09:22

## 2019-05-30 RX ADMIN — ZOLPIDEM TARTRATE 10 MG: 5 TABLET, COATED ORAL at 21:55

## 2019-05-30 RX ADMIN — HYDROMORPHONE HYDROCHLORIDE 0.5 MG: 1 INJECTION, SOLUTION INTRAMUSCULAR; INTRAVENOUS; SUBCUTANEOUS at 08:08

## 2019-05-30 RX ADMIN — BACLOFEN 10 MG: 10 TABLET ORAL at 09:24

## 2019-05-30 RX ADMIN — HYDROMORPHONE HYDROCHLORIDE 0.5 MG: 1 INJECTION, SOLUTION INTRAMUSCULAR; INTRAVENOUS; SUBCUTANEOUS at 17:12

## 2019-05-30 RX ADMIN — MIRTAZAPINE 30 MG: 15 TABLET, FILM COATED ORAL at 21:55

## 2019-05-30 RX ADMIN — PROPOFOL 150 MG: 10 INJECTION, EMULSION INTRAVENOUS at 16:11

## 2019-05-30 RX ADMIN — GABAPENTIN 400 MG: 400 CAPSULE ORAL at 17:12

## 2019-05-30 RX ADMIN — NICOTINE 1 PATCH: 21 PATCH, EXTENDED RELEASE TRANSDERMAL at 09:25

## 2019-05-30 RX ADMIN — SODIUM CHLORIDE 125 ML/HR: 0.9 INJECTION, SOLUTION INTRAVENOUS at 13:46

## 2019-05-30 RX ADMIN — SODIUM CHLORIDE: 0.9 INJECTION, SOLUTION INTRAVENOUS at 16:08

## 2019-05-30 RX ADMIN — HYDROMORPHONE HYDROCHLORIDE 0.5 MG: 1 INJECTION, SOLUTION INTRAMUSCULAR; INTRAVENOUS; SUBCUTANEOUS at 03:02

## 2019-05-30 RX ADMIN — PROPOFOL 50 MG: 10 INJECTION, EMULSION INTRAVENOUS at 16:21

## 2019-05-30 RX ADMIN — BACLOFEN 10 MG: 10 TABLET ORAL at 21:55

## 2019-05-30 RX ADMIN — PROPOFOL 50 MG: 10 INJECTION, EMULSION INTRAVENOUS at 16:13

## 2019-05-30 RX ADMIN — KETOROLAC TROMETHAMINE 15 MG: 30 INJECTION, SOLUTION INTRAMUSCULAR; INTRAVENOUS at 18:30

## 2019-05-30 RX ADMIN — VENLAFAXINE HYDROCHLORIDE 150 MG: 150 CAPSULE, EXTENDED RELEASE ORAL at 09:22

## 2019-05-30 RX ADMIN — PANTOPRAZOLE SODIUM 40 MG: 40 INJECTION, POWDER, FOR SOLUTION INTRAVENOUS at 21:55

## 2019-05-30 RX ADMIN — PANTOPRAZOLE SODIUM 40 MG: 40 INJECTION, POWDER, FOR SOLUTION INTRAVENOUS at 09:27

## 2019-05-30 RX ADMIN — DIPHENHYDRAMINE HYDROCHLORIDE 50 MG: 50 INJECTION INTRAMUSCULAR; INTRAVENOUS at 19:05

## 2019-05-31 LAB
ANION GAP SERPL CALCULATED.3IONS-SCNC: 12 MMOL/L (ref 4–13)
BUN SERPL-MCNC: 6 MG/DL (ref 5–25)
CALCIUM SERPL-MCNC: 9.3 MG/DL (ref 8.3–10.1)
CHLORIDE SERPL-SCNC: 107 MMOL/L (ref 100–108)
CO2 SERPL-SCNC: 22 MMOL/L (ref 21–32)
CREAT SERPL-MCNC: 0.91 MG/DL (ref 0.6–1.3)
ERYTHROCYTE [DISTWIDTH] IN BLOOD BY AUTOMATED COUNT: 14.8 % (ref 11.6–15.1)
GFR SERPL CREATININE-BSD FRML MDRD: 74 ML/MIN/1.73SQ M
GLUCOSE SERPL-MCNC: 96 MG/DL (ref 65–140)
HCT VFR BLD AUTO: 42.4 % (ref 34.8–46.1)
HGB BLD-MCNC: 13 G/DL (ref 11.5–15.4)
MCH RBC QN AUTO: 31.6 PG (ref 26.8–34.3)
MCHC RBC AUTO-ENTMCNC: 30.7 G/DL (ref 31.4–37.4)
MCV RBC AUTO: 103 FL (ref 82–98)
PLATELET # BLD AUTO: 226 THOUSANDS/UL (ref 149–390)
PMV BLD AUTO: 10.3 FL (ref 8.9–12.7)
POTASSIUM SERPL-SCNC: 3.7 MMOL/L (ref 3.5–5.3)
RBC # BLD AUTO: 4.12 MILLION/UL (ref 3.81–5.12)
SODIUM SERPL-SCNC: 141 MMOL/L (ref 136–145)
WBC # BLD AUTO: 11.58 THOUSAND/UL (ref 4.31–10.16)

## 2019-05-31 PROCEDURE — 99232 SBSQ HOSP IP/OBS MODERATE 35: CPT | Performed by: INTERNAL MEDICINE

## 2019-05-31 PROCEDURE — 99232 SBSQ HOSP IP/OBS MODERATE 35: CPT | Performed by: FAMILY MEDICINE

## 2019-05-31 PROCEDURE — 80048 BASIC METABOLIC PNL TOTAL CA: CPT | Performed by: INTERNAL MEDICINE

## 2019-05-31 PROCEDURE — 85027 COMPLETE CBC AUTOMATED: CPT | Performed by: INTERNAL MEDICINE

## 2019-05-31 PROCEDURE — C9113 INJ PANTOPRAZOLE SODIUM, VIA: HCPCS | Performed by: INTERNAL MEDICINE

## 2019-05-31 RX ORDER — DICYCLOMINE HCL 20 MG
20 TABLET ORAL
Status: DISCONTINUED | OUTPATIENT
Start: 2019-05-31 | End: 2019-06-03 | Stop reason: HOSPADM

## 2019-05-31 RX ADMIN — PANTOPRAZOLE SODIUM 40 MG: 40 INJECTION, POWDER, FOR SOLUTION INTRAVENOUS at 08:27

## 2019-05-31 RX ADMIN — HYDROMORPHONE HYDROCHLORIDE 0.5 MG: 1 INJECTION, SOLUTION INTRAMUSCULAR; INTRAVENOUS; SUBCUTANEOUS at 22:04

## 2019-05-31 RX ADMIN — GABAPENTIN 400 MG: 400 CAPSULE ORAL at 15:04

## 2019-05-31 RX ADMIN — HYDROMORPHONE HYDROCHLORIDE 0.5 MG: 1 INJECTION, SOLUTION INTRAMUSCULAR; INTRAVENOUS; SUBCUTANEOUS at 08:34

## 2019-05-31 RX ADMIN — ZOLPIDEM TARTRATE 10 MG: 5 TABLET, COATED ORAL at 22:04

## 2019-05-31 RX ADMIN — KETOROLAC TROMETHAMINE 15 MG: 30 INJECTION, SOLUTION INTRAMUSCULAR; INTRAVENOUS at 11:49

## 2019-05-31 RX ADMIN — DICYCLOMINE HYDROCHLORIDE 20 MG: 20 TABLET ORAL at 15:04

## 2019-05-31 RX ADMIN — HYDROMORPHONE HYDROCHLORIDE 0.5 MG: 1 INJECTION, SOLUTION INTRAMUSCULAR; INTRAVENOUS; SUBCUTANEOUS at 12:44

## 2019-05-31 RX ADMIN — PIPERACILLIN SODIUM AND TAZOBACTAM SODIUM 3.38 G: 36; 4.5 INJECTION, POWDER, FOR SOLUTION INTRAVENOUS at 00:53

## 2019-05-31 RX ADMIN — LISINOPRIL 20 MG: 20 TABLET ORAL at 08:27

## 2019-05-31 RX ADMIN — VENLAFAXINE HYDROCHLORIDE 150 MG: 150 CAPSULE, EXTENDED RELEASE ORAL at 08:27

## 2019-05-31 RX ADMIN — BUDESONIDE AND FORMOTEROL FUMARATE DIHYDRATE 2 PUFF: 160; 4.5 AEROSOL RESPIRATORY (INHALATION) at 20:26

## 2019-05-31 RX ADMIN — BUDESONIDE AND FORMOTEROL FUMARATE DIHYDRATE 2 PUFF: 160; 4.5 AEROSOL RESPIRATORY (INHALATION) at 08:24

## 2019-05-31 RX ADMIN — PIPERACILLIN SODIUM AND TAZOBACTAM SODIUM 3.38 G: 36; 4.5 INJECTION, POWDER, FOR SOLUTION INTRAVENOUS at 05:05

## 2019-05-31 RX ADMIN — KETOROLAC TROMETHAMINE 15 MG: 30 INJECTION, SOLUTION INTRAMUSCULAR; INTRAVENOUS at 00:53

## 2019-05-31 RX ADMIN — SODIUM CHLORIDE 125 ML/HR: 0.9 INJECTION, SOLUTION INTRAVENOUS at 15:04

## 2019-05-31 RX ADMIN — GABAPENTIN 400 MG: 400 CAPSULE ORAL at 20:27

## 2019-05-31 RX ADMIN — PIPERACILLIN SODIUM AND TAZOBACTAM SODIUM 3.38 G: 36; 4.5 INJECTION, POWDER, FOR SOLUTION INTRAVENOUS at 18:04

## 2019-05-31 RX ADMIN — BACLOFEN 10 MG: 10 TABLET ORAL at 22:04

## 2019-05-31 RX ADMIN — GABAPENTIN 400 MG: 400 CAPSULE ORAL at 08:27

## 2019-05-31 RX ADMIN — MIRTAZAPINE 30 MG: 15 TABLET, FILM COATED ORAL at 22:04

## 2019-05-31 RX ADMIN — HYDROMORPHONE HYDROCHLORIDE 1 MG: 1 INJECTION, SOLUTION INTRAMUSCULAR; INTRAVENOUS; SUBCUTANEOUS at 14:22

## 2019-05-31 RX ADMIN — PIPERACILLIN SODIUM AND TAZOBACTAM SODIUM 3.38 G: 36; 4.5 INJECTION, POWDER, FOR SOLUTION INTRAVENOUS at 11:50

## 2019-05-31 RX ADMIN — BACLOFEN 10 MG: 10 TABLET ORAL at 08:28

## 2019-05-31 RX ADMIN — NICOTINE 1 PATCH: 21 PATCH, EXTENDED RELEASE TRANSDERMAL at 08:27

## 2019-05-31 RX ADMIN — PANTOPRAZOLE SODIUM 40 MG: 40 INJECTION, POWDER, FOR SOLUTION INTRAVENOUS at 20:27

## 2019-05-31 RX ADMIN — DICYCLOMINE HYDROCHLORIDE 20 MG: 20 TABLET ORAL at 22:04

## 2019-05-31 RX ADMIN — KETOROLAC TROMETHAMINE 15 MG: 30 INJECTION, SOLUTION INTRAMUSCULAR; INTRAVENOUS at 05:06

## 2019-05-31 RX ADMIN — HYDROMORPHONE HYDROCHLORIDE 1 MG: 1 INJECTION, SOLUTION INTRAMUSCULAR; INTRAVENOUS; SUBCUTANEOUS at 05:05

## 2019-06-01 LAB
ANION GAP SERPL CALCULATED.3IONS-SCNC: 11 MMOL/L (ref 4–13)
BASOPHILS # BLD AUTO: 0.06 THOUSANDS/ΜL (ref 0–0.1)
BASOPHILS NFR BLD AUTO: 1 % (ref 0–1)
BUN SERPL-MCNC: 3 MG/DL (ref 5–25)
CALCIUM SERPL-MCNC: 8.9 MG/DL (ref 8.3–10.1)
CHLORIDE SERPL-SCNC: 108 MMOL/L (ref 100–108)
CO2 SERPL-SCNC: 24 MMOL/L (ref 21–32)
CREAT SERPL-MCNC: 0.77 MG/DL (ref 0.6–1.3)
EOSINOPHIL # BLD AUTO: 0.17 THOUSAND/ΜL (ref 0–0.61)
EOSINOPHIL NFR BLD AUTO: 2 % (ref 0–6)
ERYTHROCYTE [DISTWIDTH] IN BLOOD BY AUTOMATED COUNT: 14.7 % (ref 11.6–15.1)
GFR SERPL CREATININE-BSD FRML MDRD: 90 ML/MIN/1.73SQ M
GLUCOSE SERPL-MCNC: 92 MG/DL (ref 65–140)
HCT VFR BLD AUTO: 38.5 % (ref 34.8–46.1)
HGB BLD-MCNC: 12 G/DL (ref 11.5–15.4)
IMM GRANULOCYTES # BLD AUTO: 0.02 THOUSAND/UL (ref 0–0.2)
IMM GRANULOCYTES NFR BLD AUTO: 0 % (ref 0–2)
LYMPHOCYTES # BLD AUTO: 1.84 THOUSANDS/ΜL (ref 0.6–4.47)
LYMPHOCYTES NFR BLD AUTO: 22 % (ref 14–44)
MCH RBC QN AUTO: 31.4 PG (ref 26.8–34.3)
MCHC RBC AUTO-ENTMCNC: 31.2 G/DL (ref 31.4–37.4)
MCV RBC AUTO: 101 FL (ref 82–98)
MONOCYTES # BLD AUTO: 0.44 THOUSAND/ΜL (ref 0.17–1.22)
MONOCYTES NFR BLD AUTO: 5 % (ref 4–12)
NEUTROPHILS # BLD AUTO: 5.67 THOUSANDS/ΜL (ref 1.85–7.62)
NEUTS SEG NFR BLD AUTO: 70 % (ref 43–75)
NRBC BLD AUTO-RTO: 0 /100 WBCS
PLATELET # BLD AUTO: 216 THOUSANDS/UL (ref 149–390)
PMV BLD AUTO: 10.3 FL (ref 8.9–12.7)
POTASSIUM SERPL-SCNC: 3.4 MMOL/L (ref 3.5–5.3)
RBC # BLD AUTO: 3.82 MILLION/UL (ref 3.81–5.12)
SODIUM SERPL-SCNC: 143 MMOL/L (ref 136–145)
WBC # BLD AUTO: 8.2 THOUSAND/UL (ref 4.31–10.16)

## 2019-06-01 PROCEDURE — 99232 SBSQ HOSP IP/OBS MODERATE 35: CPT | Performed by: HOSPITALIST

## 2019-06-01 PROCEDURE — 80048 BASIC METABOLIC PNL TOTAL CA: CPT | Performed by: FAMILY MEDICINE

## 2019-06-01 PROCEDURE — C9113 INJ PANTOPRAZOLE SODIUM, VIA: HCPCS | Performed by: INTERNAL MEDICINE

## 2019-06-01 PROCEDURE — 85025 COMPLETE CBC W/AUTO DIFF WBC: CPT | Performed by: FAMILY MEDICINE

## 2019-06-01 PROCEDURE — 99232 SBSQ HOSP IP/OBS MODERATE 35: CPT | Performed by: PHYSICIAN ASSISTANT

## 2019-06-01 RX ORDER — PANTOPRAZOLE SODIUM 40 MG/1
40 TABLET, DELAYED RELEASE ORAL
Status: DISCONTINUED | OUTPATIENT
Start: 2019-06-01 | End: 2019-06-03 | Stop reason: HOSPADM

## 2019-06-01 RX ADMIN — BACLOFEN 10 MG: 10 TABLET ORAL at 21:34

## 2019-06-01 RX ADMIN — SODIUM CHLORIDE 75 ML/HR: 0.9 INJECTION, SOLUTION INTRAVENOUS at 23:57

## 2019-06-01 RX ADMIN — PIPERACILLIN SODIUM AND TAZOBACTAM SODIUM 3.38 G: 36; 4.5 INJECTION, POWDER, FOR SOLUTION INTRAVENOUS at 11:28

## 2019-06-01 RX ADMIN — PIPERACILLIN SODIUM AND TAZOBACTAM SODIUM 3.38 G: 36; 4.5 INJECTION, POWDER, FOR SOLUTION INTRAVENOUS at 17:26

## 2019-06-01 RX ADMIN — GABAPENTIN 400 MG: 400 CAPSULE ORAL at 17:21

## 2019-06-01 RX ADMIN — HYDROMORPHONE HYDROCHLORIDE 0.5 MG: 1 INJECTION, SOLUTION INTRAMUSCULAR; INTRAVENOUS; SUBCUTANEOUS at 21:34

## 2019-06-01 RX ADMIN — GABAPENTIN 400 MG: 400 CAPSULE ORAL at 08:21

## 2019-06-01 RX ADMIN — GABAPENTIN 400 MG: 400 CAPSULE ORAL at 21:34

## 2019-06-01 RX ADMIN — MIRTAZAPINE 30 MG: 15 TABLET, FILM COATED ORAL at 21:34

## 2019-06-01 RX ADMIN — VENLAFAXINE HYDROCHLORIDE 150 MG: 150 CAPSULE, EXTENDED RELEASE ORAL at 08:21

## 2019-06-01 RX ADMIN — BUDESONIDE AND FORMOTEROL FUMARATE DIHYDRATE 2 PUFF: 160; 4.5 AEROSOL RESPIRATORY (INHALATION) at 08:23

## 2019-06-01 RX ADMIN — BACLOFEN 10 MG: 10 TABLET ORAL at 08:21

## 2019-06-01 RX ADMIN — PIPERACILLIN SODIUM AND TAZOBACTAM SODIUM 3.38 G: 36; 4.5 INJECTION, POWDER, FOR SOLUTION INTRAVENOUS at 06:23

## 2019-06-01 RX ADMIN — NICOTINE 1 PATCH: 21 PATCH, EXTENDED RELEASE TRANSDERMAL at 08:21

## 2019-06-01 RX ADMIN — SODIUM CHLORIDE 125 ML/HR: 0.9 INJECTION, SOLUTION INTRAVENOUS at 10:36

## 2019-06-01 RX ADMIN — HYDROMORPHONE HYDROCHLORIDE 0.5 MG: 1 INJECTION, SOLUTION INTRAMUSCULAR; INTRAVENOUS; SUBCUTANEOUS at 08:20

## 2019-06-01 RX ADMIN — SODIUM CHLORIDE 125 ML/HR: 0.9 INJECTION, SOLUTION INTRAVENOUS at 01:28

## 2019-06-01 RX ADMIN — PIPERACILLIN SODIUM AND TAZOBACTAM SODIUM 3.38 G: 36; 4.5 INJECTION, POWDER, FOR SOLUTION INTRAVENOUS at 01:28

## 2019-06-01 RX ADMIN — LISINOPRIL 20 MG: 20 TABLET ORAL at 08:21

## 2019-06-01 RX ADMIN — BUDESONIDE AND FORMOTEROL FUMARATE DIHYDRATE 2 PUFF: 160; 4.5 AEROSOL RESPIRATORY (INHALATION) at 20:00

## 2019-06-01 RX ADMIN — PIPERACILLIN SODIUM AND TAZOBACTAM SODIUM 3.38 G: 36; 4.5 INJECTION, POWDER, FOR SOLUTION INTRAVENOUS at 23:57

## 2019-06-01 RX ADMIN — DICYCLOMINE HYDROCHLORIDE 20 MG: 20 TABLET ORAL at 10:36

## 2019-06-01 RX ADMIN — DICYCLOMINE HYDROCHLORIDE 20 MG: 20 TABLET ORAL at 06:23

## 2019-06-01 RX ADMIN — HYDROMORPHONE HYDROCHLORIDE 0.5 MG: 1 INJECTION, SOLUTION INTRAMUSCULAR; INTRAVENOUS; SUBCUTANEOUS at 17:21

## 2019-06-01 RX ADMIN — PANTOPRAZOLE SODIUM 40 MG: 40 INJECTION, POWDER, FOR SOLUTION INTRAVENOUS at 08:21

## 2019-06-01 RX ADMIN — DICYCLOMINE HYDROCHLORIDE 20 MG: 20 TABLET ORAL at 17:21

## 2019-06-01 RX ADMIN — DICYCLOMINE HYDROCHLORIDE 20 MG: 20 TABLET ORAL at 21:34

## 2019-06-01 RX ADMIN — HYDROMORPHONE HYDROCHLORIDE 0.5 MG: 1 INJECTION, SOLUTION INTRAMUSCULAR; INTRAVENOUS; SUBCUTANEOUS at 13:32

## 2019-06-01 RX ADMIN — ZOLPIDEM TARTRATE 10 MG: 5 TABLET, COATED ORAL at 21:34

## 2019-06-02 LAB
ANION GAP SERPL CALCULATED.3IONS-SCNC: 10 MMOL/L (ref 4–13)
BUN SERPL-MCNC: 4 MG/DL (ref 5–25)
CALCIUM SERPL-MCNC: 9.3 MG/DL (ref 8.3–10.1)
CHLORIDE SERPL-SCNC: 106 MMOL/L (ref 100–108)
CO2 SERPL-SCNC: 26 MMOL/L (ref 21–32)
CREAT SERPL-MCNC: 0.79 MG/DL (ref 0.6–1.3)
GFR SERPL CREATININE-BSD FRML MDRD: 88 ML/MIN/1.73SQ M
GLUCOSE SERPL-MCNC: 105 MG/DL (ref 65–140)
POTASSIUM SERPL-SCNC: 3.6 MMOL/L (ref 3.5–5.3)
SODIUM SERPL-SCNC: 142 MMOL/L (ref 136–145)

## 2019-06-02 PROCEDURE — 80048 BASIC METABOLIC PNL TOTAL CA: CPT | Performed by: HOSPITALIST

## 2019-06-02 PROCEDURE — 99232 SBSQ HOSP IP/OBS MODERATE 35: CPT | Performed by: HOSPITALIST

## 2019-06-02 RX ORDER — OXYCODONE HYDROCHLORIDE 5 MG/1
5 TABLET ORAL EVERY 4 HOURS PRN
Status: DISCONTINUED | OUTPATIENT
Start: 2019-06-02 | End: 2019-06-03 | Stop reason: HOSPADM

## 2019-06-02 RX ORDER — HYDROMORPHONE HCL 110MG/55ML
2 PATIENT CONTROLLED ANALGESIA SYRINGE INTRAVENOUS EVERY 4 HOURS PRN
Status: DISCONTINUED | OUTPATIENT
Start: 2019-06-02 | End: 2019-06-03 | Stop reason: HOSPADM

## 2019-06-02 RX ADMIN — PANTOPRAZOLE SODIUM 40 MG: 40 TABLET, DELAYED RELEASE ORAL at 05:34

## 2019-06-02 RX ADMIN — BUDESONIDE AND FORMOTEROL FUMARATE DIHYDRATE 2 PUFF: 160; 4.5 AEROSOL RESPIRATORY (INHALATION) at 21:06

## 2019-06-02 RX ADMIN — DICYCLOMINE HYDROCHLORIDE 20 MG: 20 TABLET ORAL at 21:07

## 2019-06-02 RX ADMIN — GABAPENTIN 400 MG: 400 CAPSULE ORAL at 21:07

## 2019-06-02 RX ADMIN — GABAPENTIN 400 MG: 400 CAPSULE ORAL at 16:12

## 2019-06-02 RX ADMIN — OXYCODONE HYDROCHLORIDE 5 MG: 5 TABLET ORAL at 09:13

## 2019-06-02 RX ADMIN — GABAPENTIN 400 MG: 400 CAPSULE ORAL at 08:08

## 2019-06-02 RX ADMIN — DICYCLOMINE HYDROCHLORIDE 20 MG: 20 TABLET ORAL at 08:08

## 2019-06-02 RX ADMIN — PIPERACILLIN SODIUM AND TAZOBACTAM SODIUM 3.38 G: 36; 4.5 INJECTION, POWDER, FOR SOLUTION INTRAVENOUS at 05:34

## 2019-06-02 RX ADMIN — HYDROMORPHONE HYDROCHLORIDE 2 MG: 2 INJECTION INTRAMUSCULAR; INTRAVENOUS; SUBCUTANEOUS at 21:07

## 2019-06-02 RX ADMIN — BACLOFEN 10 MG: 10 TABLET ORAL at 21:07

## 2019-06-02 RX ADMIN — HYDROMORPHONE HYDROCHLORIDE 1 MG: 1 INJECTION, SOLUTION INTRAMUSCULAR; INTRAVENOUS; SUBCUTANEOUS at 10:27

## 2019-06-02 RX ADMIN — NICOTINE 1 PATCH: 21 PATCH, EXTENDED RELEASE TRANSDERMAL at 08:09

## 2019-06-02 RX ADMIN — PIPERACILLIN SODIUM AND TAZOBACTAM SODIUM 3.38 G: 36; 4.5 INJECTION, POWDER, FOR SOLUTION INTRAVENOUS at 17:18

## 2019-06-02 RX ADMIN — HYDROMORPHONE HYDROCHLORIDE 0.5 MG: 1 INJECTION, SOLUTION INTRAMUSCULAR; INTRAVENOUS; SUBCUTANEOUS at 05:45

## 2019-06-02 RX ADMIN — MIRTAZAPINE 30 MG: 15 TABLET, FILM COATED ORAL at 21:07

## 2019-06-02 RX ADMIN — DICYCLOMINE HYDROCHLORIDE 20 MG: 20 TABLET ORAL at 11:40

## 2019-06-02 RX ADMIN — VENLAFAXINE HYDROCHLORIDE 150 MG: 150 CAPSULE, EXTENDED RELEASE ORAL at 08:08

## 2019-06-02 RX ADMIN — DICYCLOMINE HYDROCHLORIDE 20 MG: 20 TABLET ORAL at 16:12

## 2019-06-02 RX ADMIN — PIPERACILLIN SODIUM AND TAZOBACTAM SODIUM 3.38 G: 36; 4.5 INJECTION, POWDER, FOR SOLUTION INTRAVENOUS at 11:40

## 2019-06-02 RX ADMIN — LISINOPRIL 20 MG: 20 TABLET ORAL at 08:08

## 2019-06-02 RX ADMIN — HYDROMORPHONE HYDROCHLORIDE 2 MG: 2 INJECTION INTRAMUSCULAR; INTRAVENOUS; SUBCUTANEOUS at 16:12

## 2019-06-02 RX ADMIN — OXYCODONE HYDROCHLORIDE 5 MG: 5 TABLET ORAL at 14:08

## 2019-06-02 RX ADMIN — TRAMADOL HYDROCHLORIDE 50 MG: 50 TABLET, COATED ORAL at 08:08

## 2019-06-02 RX ADMIN — BUDESONIDE AND FORMOTEROL FUMARATE DIHYDRATE 2 PUFF: 160; 4.5 AEROSOL RESPIRATORY (INHALATION) at 08:08

## 2019-06-02 RX ADMIN — ZOLPIDEM TARTRATE 10 MG: 5 TABLET, COATED ORAL at 21:07

## 2019-06-03 VITALS
SYSTOLIC BLOOD PRESSURE: 133 MMHG | DIASTOLIC BLOOD PRESSURE: 98 MMHG | BODY MASS INDEX: 26.52 KG/M2 | HEART RATE: 88 BPM | HEIGHT: 66 IN | WEIGHT: 165 LBS | TEMPERATURE: 97.8 F | RESPIRATION RATE: 20 BRPM | OXYGEN SATURATION: 92 %

## 2019-06-03 LAB
BACTERIA BLD CULT: NORMAL
BACTERIA BLD CULT: NORMAL

## 2019-06-03 PROCEDURE — NC001 PR NO CHARGE: Performed by: PHYSICIAN ASSISTANT

## 2019-06-03 PROCEDURE — 99239 HOSP IP/OBS DSCHRG MGMT >30: CPT | Performed by: HOSPITALIST

## 2019-06-03 RX ORDER — DICYCLOMINE HCL 20 MG
20 TABLET ORAL EVERY 6 HOURS PRN
Qty: 30 TABLET | Refills: 0 | Status: SHIPPED | OUTPATIENT
Start: 2019-06-03 | End: 2019-06-17 | Stop reason: ALTCHOICE

## 2019-06-03 RX ORDER — FLUCONAZOLE 100 MG/1
200 TABLET ORAL DAILY
Status: DISCONTINUED | OUTPATIENT
Start: 2019-06-03 | End: 2019-06-03 | Stop reason: HOSPADM

## 2019-06-03 RX ORDER — OXYCODONE HYDROCHLORIDE 5 MG/1
5 TABLET ORAL EVERY 4 HOURS PRN
Qty: 30 TABLET | Refills: 0 | Status: SHIPPED | OUTPATIENT
Start: 2019-06-03 | End: 2019-06-05 | Stop reason: SDUPTHER

## 2019-06-03 RX ADMIN — OXYCODONE HYDROCHLORIDE 5 MG: 5 TABLET ORAL at 01:53

## 2019-06-03 RX ADMIN — HYDROMORPHONE HYDROCHLORIDE 2 MG: 2 INJECTION INTRAMUSCULAR; INTRAVENOUS; SUBCUTANEOUS at 06:21

## 2019-06-03 RX ADMIN — DICYCLOMINE HYDROCHLORIDE 20 MG: 20 TABLET ORAL at 09:00

## 2019-06-03 RX ADMIN — DICYCLOMINE HYDROCHLORIDE 20 MG: 20 TABLET ORAL at 11:10

## 2019-06-03 RX ADMIN — FLUCONAZOLE 200 MG: 100 TABLET ORAL at 11:10

## 2019-06-03 RX ADMIN — OXYCODONE HYDROCHLORIDE 5 MG: 5 TABLET ORAL at 08:59

## 2019-06-03 RX ADMIN — PIPERACILLIN SODIUM AND TAZOBACTAM SODIUM 3.38 G: 36; 4.5 INJECTION, POWDER, FOR SOLUTION INTRAVENOUS at 05:34

## 2019-06-03 RX ADMIN — BUDESONIDE AND FORMOTEROL FUMARATE DIHYDRATE 2 PUFF: 160; 4.5 AEROSOL RESPIRATORY (INHALATION) at 09:01

## 2019-06-03 RX ADMIN — LISINOPRIL 20 MG: 20 TABLET ORAL at 09:00

## 2019-06-03 RX ADMIN — PIPERACILLIN SODIUM AND TAZOBACTAM SODIUM 3.38 G: 36; 4.5 INJECTION, POWDER, FOR SOLUTION INTRAVENOUS at 01:00

## 2019-06-03 RX ADMIN — PANTOPRAZOLE SODIUM 40 MG: 40 TABLET, DELAYED RELEASE ORAL at 05:34

## 2019-06-03 RX ADMIN — GABAPENTIN 400 MG: 400 CAPSULE ORAL at 08:59

## 2019-06-03 RX ADMIN — VENLAFAXINE HYDROCHLORIDE 150 MG: 150 CAPSULE, EXTENDED RELEASE ORAL at 08:59

## 2019-06-03 RX ADMIN — NICOTINE 1 PATCH: 21 PATCH, EXTENDED RELEASE TRANSDERMAL at 09:00

## 2019-06-04 ENCOUNTER — TELEPHONE (OUTPATIENT)
Dept: GASTROENTEROLOGY | Facility: CLINIC | Age: 49
End: 2019-06-04

## 2019-06-05 ENCOUNTER — OFFICE VISIT (OUTPATIENT)
Dept: GASTROENTEROLOGY | Facility: MEDICAL CENTER | Age: 49
End: 2019-06-05
Payer: COMMERCIAL

## 2019-06-05 VITALS
DIASTOLIC BLOOD PRESSURE: 74 MMHG | TEMPERATURE: 98.5 F | HEART RATE: 103 BPM | WEIGHT: 168 LBS | BODY MASS INDEX: 27.12 KG/M2 | SYSTOLIC BLOOD PRESSURE: 112 MMHG

## 2019-06-05 DIAGNOSIS — K92.1 HEMATOCHEZIA: ICD-10-CM

## 2019-06-05 DIAGNOSIS — K52.9 ACUTE COLITIS: ICD-10-CM

## 2019-06-05 DIAGNOSIS — K55.039 ACUTE ISCHEMIC COLITIS (HCC): Primary | ICD-10-CM

## 2019-06-05 PROCEDURE — 99214 OFFICE O/P EST MOD 30 MIN: CPT | Performed by: PHYSICIAN ASSISTANT

## 2019-06-05 RX ORDER — OXYCODONE HYDROCHLORIDE 5 MG/1
5 TABLET ORAL EVERY 4 HOURS PRN
Qty: 20 TABLET | Refills: 0 | Status: SHIPPED | OUTPATIENT
Start: 2019-06-05 | End: 2019-06-12 | Stop reason: SDUPTHER

## 2019-06-06 ENCOUNTER — TELEPHONE (OUTPATIENT)
Dept: PAIN MEDICINE | Facility: MEDICAL CENTER | Age: 49
End: 2019-06-06

## 2019-06-07 ENCOUNTER — TRANSITIONAL CARE MANAGEMENT (OUTPATIENT)
Dept: FAMILY MEDICINE CLINIC | Facility: CLINIC | Age: 49
End: 2019-06-07

## 2019-06-11 ENCOUNTER — TELEPHONE (OUTPATIENT)
Dept: GASTROENTEROLOGY | Facility: AMBULARY SURGERY CENTER | Age: 49
End: 2019-06-11

## 2019-06-11 ENCOUNTER — TELEPHONE (OUTPATIENT)
Dept: GASTROENTEROLOGY | Facility: MEDICAL CENTER | Age: 49
End: 2019-06-11

## 2019-06-12 ENCOUNTER — OFFICE VISIT (OUTPATIENT)
Dept: GASTROENTEROLOGY | Facility: MEDICAL CENTER | Age: 49
End: 2019-06-12
Payer: COMMERCIAL

## 2019-06-12 VITALS
SYSTOLIC BLOOD PRESSURE: 108 MMHG | BODY MASS INDEX: 27.35 KG/M2 | HEIGHT: 66 IN | HEART RATE: 110 BPM | TEMPERATURE: 98.5 F | WEIGHT: 170.2 LBS | DIASTOLIC BLOOD PRESSURE: 64 MMHG

## 2019-06-12 DIAGNOSIS — K55.9 ISCHEMIC COLITIS (HCC): Primary | ICD-10-CM

## 2019-06-12 DIAGNOSIS — R10.84 GENERALIZED ABDOMINAL PAIN: ICD-10-CM

## 2019-06-12 DIAGNOSIS — K52.9 ACUTE COLITIS: ICD-10-CM

## 2019-06-12 DIAGNOSIS — Z72.0 TOBACCO USE: ICD-10-CM

## 2019-06-12 PROCEDURE — 99244 OFF/OP CNSLTJ NEW/EST MOD 40: CPT | Performed by: INTERNAL MEDICINE

## 2019-06-12 RX ORDER — OXYCODONE HYDROCHLORIDE 5 MG/1
5 TABLET ORAL EVERY 6 HOURS PRN
Qty: 20 TABLET | Refills: 0 | Status: SHIPPED | OUTPATIENT
Start: 2019-06-12 | End: 2019-07-11 | Stop reason: ALTCHOICE

## 2019-06-16 DIAGNOSIS — J44.9 COPD WITHOUT EXACERBATION (HCC): ICD-10-CM

## 2019-06-17 ENCOUNTER — OFFICE VISIT (OUTPATIENT)
Dept: FAMILY MEDICINE CLINIC | Facility: CLINIC | Age: 49
End: 2019-06-17
Payer: COMMERCIAL

## 2019-06-17 VITALS
HEART RATE: 110 BPM | DIASTOLIC BLOOD PRESSURE: 68 MMHG | RESPIRATION RATE: 24 BRPM | TEMPERATURE: 99 F | SYSTOLIC BLOOD PRESSURE: 110 MMHG | WEIGHT: 169.4 LBS | HEIGHT: 66 IN | BODY MASS INDEX: 27.23 KG/M2 | OXYGEN SATURATION: 95 %

## 2019-06-17 DIAGNOSIS — F33.2 SEVERE EPISODE OF RECURRENT MAJOR DEPRESSIVE DISORDER, WITHOUT PSYCHOTIC FEATURES (HCC): ICD-10-CM

## 2019-06-17 DIAGNOSIS — G89.29 OTHER CHRONIC PAIN: ICD-10-CM

## 2019-06-17 DIAGNOSIS — I10 ESSENTIAL HYPERTENSION: ICD-10-CM

## 2019-06-17 DIAGNOSIS — K55.039 ACUTE ISCHEMIC COLITIS (HCC): Primary | ICD-10-CM

## 2019-06-17 DIAGNOSIS — Z72.0 TOBACCO ABUSE: ICD-10-CM

## 2019-06-17 PROCEDURE — 99495 TRANSJ CARE MGMT MOD F2F 14D: CPT | Performed by: INTERNAL MEDICINE

## 2019-06-17 RX ORDER — MIRTAZAPINE 30 MG/1
30 TABLET, FILM COATED ORAL
Qty: 30 TABLET | Refills: 2 | Status: SHIPPED | OUTPATIENT
Start: 2019-06-17 | End: 2019-09-12 | Stop reason: SDUPTHER

## 2019-06-17 RX ORDER — TRAMADOL HYDROCHLORIDE 50 MG/1
100 TABLET ORAL EVERY 6 HOURS PRN
Qty: 180 TABLET | Refills: 0 | Status: SHIPPED | OUTPATIENT
Start: 2019-06-17 | End: 2019-07-17 | Stop reason: SDUPTHER

## 2019-06-17 RX ORDER — BUDESONIDE AND FORMOTEROL FUMARATE DIHYDRATE 160; 4.5 UG/1; UG/1
AEROSOL RESPIRATORY (INHALATION)
Qty: 10.2 INHALER | Refills: 0 | Status: SHIPPED | OUTPATIENT
Start: 2019-06-17 | End: 2019-07-22 | Stop reason: SDUPTHER

## 2019-06-18 ENCOUNTER — TELEPHONE (OUTPATIENT)
Dept: GASTROENTEROLOGY | Facility: AMBULARY SURGERY CENTER | Age: 49
End: 2019-06-18

## 2019-06-18 ENCOUNTER — TELEPHONE (OUTPATIENT)
Dept: PAIN MEDICINE | Facility: MEDICAL CENTER | Age: 49
End: 2019-06-18

## 2019-06-18 ENCOUNTER — TELEPHONE (OUTPATIENT)
Dept: FAMILY MEDICINE CLINIC | Facility: CLINIC | Age: 49
End: 2019-06-18

## 2019-06-18 DIAGNOSIS — R42 VERTIGO: Primary | ICD-10-CM

## 2019-06-18 RX ORDER — MECLIZINE HCL 12.5 MG/1
12.5 TABLET ORAL EVERY 12 HOURS PRN
Qty: 10 TABLET | Refills: 0 | Status: SHIPPED | OUTPATIENT
Start: 2019-06-18 | End: 2019-07-29

## 2019-07-03 DIAGNOSIS — G89.29 OTHER CHRONIC PAIN: Primary | ICD-10-CM

## 2019-07-04 RX ORDER — BUTALBITAL, ACETAMINOPHEN AND CAFFEINE 50; 325; 40 MG/1; MG/1; MG/1
1 TABLET ORAL EVERY 12 HOURS PRN
Qty: 90 TABLET | Refills: 0 | Status: SHIPPED | OUTPATIENT
Start: 2019-07-04 | End: 2019-10-17 | Stop reason: SDUPTHER

## 2019-07-11 ENCOUNTER — CONSULT (OUTPATIENT)
Dept: PAIN MEDICINE | Facility: MEDICAL CENTER | Age: 49
End: 2019-07-11
Payer: COMMERCIAL

## 2019-07-11 VITALS
RESPIRATION RATE: 18 BRPM | WEIGHT: 170 LBS | HEART RATE: 105 BPM | DIASTOLIC BLOOD PRESSURE: 92 MMHG | HEIGHT: 66 IN | BODY MASS INDEX: 27.32 KG/M2 | SYSTOLIC BLOOD PRESSURE: 142 MMHG

## 2019-07-11 DIAGNOSIS — Z87.81 HISTORY OF RIB FRACTURE: ICD-10-CM

## 2019-07-11 DIAGNOSIS — G58.8 INTERCOSTAL NEURALGIA: Primary | ICD-10-CM

## 2019-07-11 PROCEDURE — 99244 OFF/OP CNSLTJ NEW/EST MOD 40: CPT | Performed by: PHYSICAL MEDICINE & REHABILITATION

## 2019-07-11 RX ORDER — BACLOFEN 10 MG/1
10 TABLET ORAL 3 TIMES DAILY PRN
Refills: 1 | COMMUNITY
Start: 2019-07-01 | End: 2019-09-24

## 2019-07-11 NOTE — PROGRESS NOTES
Assessment  1  Intercostal neuralgia    2  History of rib fracture        Plan  1  At this time we will schedule patient for right intercostal nerve blocks under ultrasound guidance  The use of direct ultrasound visualization of the needle (rather than a non-guided injection) is required for this procedure to ensure accurate injection placement so there can be diagnostic specificity when evaluating effectiveness of the injection, and for safety purposes to minimize risk of bleeding or injury to surrounding structures  2  We will not make any adjustments to the patient's medication regimen at this time  Given her previous suicide attempt and regular marijuana use she is not a candidate for chronic opioid therapy  She states she has been working hard on managing her depression and feels she is in a good place currently  My impressions and treatment recommendations were discussed in detail with the patient who verbalized understanding and had no further questions  Discharge instructions were provided  I personally saw and examined the patient and I agree with the above discussed plan of care  No orders of the defined types were placed in this encounter  New Medications Ordered This Visit   Medications    baclofen 10 mg tablet     Sig: Take 10 mg by mouth 3 (three) times a day as needed     Refill:  1       History of Present Illness    Reinier Hinkle is a 46 y o  female sent for consultation regarding chronic right-sided rib pain related to nonhealing rib fractures  She states that she suffered multiple rib fractures as a result of a coughing spell in July of 2016  She has been experiencing moderate to severe intensity pain currently rated as a 7/10 which is constant without any typical pattern described as cramping, sharp, dull, aching  She localizes this to the right-sided ribs  She does have some difficulty with right upper extremity activities as well as this reproduces pain      She also has some neck and back pain complaints which we will address in the future  She has been working with pain specialist at [facility] and states that she has undergone multiple epidural steroid injections and trigger point injections but never intercostal nerve blocks  She has noted some moderate relief with prior injection therapies and no relief with physical therapy, heat or ice application, or psychotherapy  She does have a corset that she occasionally wears but finds this difficult to wear during the summer months  She admits to smoking approximately 1 pack per day as well as marijuana on a weekly basis  She has alcohol 1 drink per week  I have personally reviewed and/or updated the patient's past medical history, past surgical history, family history, social history, current medications, allergies, and vital signs today  Review of Systems   Constitutional: Positive for unexpected weight change  Negative for fever  HENT: Negative for trouble swallowing  Eyes: Negative for visual disturbance  Respiratory: Positive for cough  Negative for shortness of breath and wheezing  Cardiovascular: Negative for chest pain and palpitations  Gastrointestinal: Negative for constipation, diarrhea, nausea and vomiting  Endocrine: Negative for cold intolerance, heat intolerance and polydipsia  Genitourinary: Negative for difficulty urinating and frequency  Musculoskeletal: Positive for back pain (right side ribs), joint swelling and myalgias  Negative for arthralgias and gait problem  Skin: Negative for rash  Neurological: Positive for headaches  Negative for dizziness, seizures, syncope and weakness  Hematological: Does not bruise/bleed easily  Psychiatric/Behavioral: Positive for decreased concentration and self-injury  Negative for dysphoric mood  All other systems reviewed and are negative        Patient Active Problem List   Diagnosis    Irritable bowel syndrome with diarrhea    Hypertension    Hyperlipidemia    Severe episode of recurrent major depressive disorder, without psychotic features ([facility] )    COPD without exacerbation (HCC)    Generalized anxiety disorder    Right knee pain    Sinus tachycardia    History of rib fracture    Tobacco abuse    Chondromalacia patellae    DDD (degenerative disc disease), lumbosacral    Primary insomnia    Overweight (BMI 25 0-29  9)    Acute ischemic colitis ([facility] )    SIRS (systemic inflammatory response syndrome) (HCC)    Hematochezia       Past Medical History:   Diagnosis Date    Chronic pain disorder     Depression     GERD (gastroesophageal reflux disease)     History of electroconvulsive therapy     Low back pain     Self-injurious behavior     Suicide attempt [facility])        Past Surgical History:   Procedure Laterality Date     SECTION      COLONOSCOPY      PANCREAS SURGERY      "pseudocysts" per patient's  Ricki Olson    [place] ESOPHAGOGASTRODUODENOSCOPY TRANSORAL DIAGNOSTIC N/A 4/10/2018    Procedure: EGD AND COLONOSCOPY;  Surgeon: Daren Landis MD;  [facility];   Service: Gastroenterology       Family History   Problem Relation Age of Onset    Arthritis Mother     Coronary artery disease Mother     Colon cancer Family     Parkinsonism Father     Parkinsonism Paternal Grandmother     Coronary artery disease Paternal Grandfather     Depression Neg Hx        Social History     Occupational History    Occupation: disability   Tobacco Use    Smoking status: Current Every Day Smoker     Packs/day: 1 00     Types: Cigarettes    Smokeless tobacco: Current User   Substance and Sexual Activity    Alcohol use: Yes     Frequency: Monthly or less     Comment: vodka and beer, has decreased to occasionally    Drug use: Yes     Types: Marijuana    Sexual activity: Yes     Partners: Male     Comment: PT is        Current Outpatient Medications on File Prior to Visit   Medication Sig    baclofen 10 mg tablet Take 10 mg by mouth 3 (three) times a day as needed    butalbital-acetaminophen-caffeine (FIORICET,ESGIC) -40 mg per tablet Take 1 tablet by mouth every 12 (twelve) hours as needed for headaches    gabapentin (NEURONTIN) 400 mg capsule Take 1 capsule (400 mg total) by mouth 3 (three) times a day    lisinopril (ZESTRIL) 20 mg tablet Take 1 tablet (20 mg total) by mouth daily    mirtazapine (REMERON) 30 mg tablet Take 1 tablet (30 mg total) by mouth daily at bedtime    omeprazole (PriLOSEC) 40 MG capsule Take 1 capsule (40 mg total) by mouth every morning    ondansetron (ZOFRAN) 4 mg tablet Take 1 tablet (4 mg total) by mouth daily as needed for nausea or vomiting    ranitidine (ZANTAC) 300 MG tablet TAKE 1 TABLET BY MOUTH EVERY DAY AT BEDTIME    SYMBICORT 160-4 5 MCG/ACT inhaler INHALE 2 PUFFS BY MOUTH TWICE A DAY    traMADol (ULTRAM) 50 mg tablet Take 2 tablets (100 mg total) by mouth every 6 (six) hours as needed for moderate pain for up to 30 days    venlafaxine (EFFEXOR-XR) 150 mg 24 hr capsule Take 1 capsule (150 mg total) by mouth daily    zolpidem (AMBIEN) 10 mg tablet Take 1 tablet (10 mg total) by mouth daily at bedtime as needed for sleep    meclizine (ANTIVERT) 12 5 MG tablet Take 1 tablet (12 5 mg total) by mouth every 12 (twelve) hours as needed for dizziness for up to 5 days    [DISCONTINUED] oxyCODONE (ROXICODONE) 5 mg immediate release tablet Take 1 tablet (5 mg total) by mouth every 6 (six) hours as needed for severe pain Caution, may cause sedationMax Daily Amount: 20 mg (Patient not taking: Reported on 6/17/2019)     No current facility-administered medications on file prior to visit          Allergies   Allergen Reactions    Chantix [Varenicline]     Ibuprofen Other (See Comments)    Penicillins Other (See Comments)     ? hives    Sulfa Antibiotics Other (See Comments)     sloughing skin in mouth    Sulfasalazine        Physical Exam    /92   Pulse 105 Resp 18   Ht 5' 6" (1 676 m)   Wt 77 1 kg (170 lb)   BMI 27 44 kg/m²     Constitutional: normal, well developed, well nourished, alert, in no distress and non-toxic and no overt pain behavior  Eyes: anicteric  HEENT: grossly intact  Neck: supple, symmetric, trachea midline and no masses   Pulmonary:even and unlabored  Cardiovascular:No edema or pitting edema present  Skin:Normal without rashes or lesions and well hydrated  Psychiatric:Mood and affect appropriate  Neurologic:Cranial Nerves II-XII grossly intact  Musculoskeletal:normal, except for tenderness to palpation over the right lateral ribs along the axillary line reproducing her pain complaint    Imaging  Study Result     RIGHT RIBS AND CHEST     INDICATION:   rib pain  recent 5-9 rib fractures      COMPARISON:  Chest radiographs December 15, 2017     VIEWS:  XR RIBS RIGHT W PA CHEST MIN 3 VIEWS   Images: 4     FINDINGS:     The cardiomediastinal silhouette is unremarkable      Lungs are clear   No pleural effusions        There are old, healed fractures involving the anterolateral aspects of the right 3rd and 4th ribs      There are healing nondisplaced fractures involving the posterolateral aspects of the right 5th and 6th ribs      There is minimal healing of mildly displaced fracture involving the posterolateral aspect of the right 7th rib      There is no significant healing of mildly displaced fractures involving the posterolateral aspect of the right 8th and 9th ribs      There is minimal healing of nondisplaced fracture posterior lateral aspect right 10th rib      No new rib fractures are identified      IMPRESSION:  No active cardiopulmonary disease      Multiple right-sided rib fractures as described above            Workstation performed: [id number]

## 2019-07-15 DIAGNOSIS — G89.29 OTHER CHRONIC PAIN: ICD-10-CM

## 2019-07-15 DIAGNOSIS — K21.00 ESOPHAGITIS, REFLUX: ICD-10-CM

## 2019-07-15 RX ORDER — ONDANSETRON 4 MG/1
4 TABLET, FILM COATED ORAL DAILY PRN
Qty: 90 TABLET | Refills: 0 | Status: CANCELLED | OUTPATIENT
Start: 2019-07-15

## 2019-07-15 RX ORDER — TRAMADOL HYDROCHLORIDE 50 MG/1
100 TABLET ORAL EVERY 6 HOURS PRN
Qty: 180 TABLET | Refills: 0 | Status: CANCELLED | OUTPATIENT
Start: 2019-07-15 | End: 2019-08-14

## 2019-07-15 NOTE — TELEPHONE ENCOUNTER
PDMP 6/17     Pt called and left a message on my voicemail in regards to wanting a script for her tramadol, one last time due being told she is not able to get medications filled on her first establish care appt visit with pain management which is not until 8/15

## 2019-07-16 NOTE — TELEPHONE ENCOUNTER
Patient called again re: meds pended buddy ESTRADA for pt re: cannot refill meds until Tai Patterson returns on Monday per Dr Zve Flynn    Note from latest Pain Management appt on 7/11/19    2  We will not make any adjustments to the patient's medication regimen at this time  Given her previous suicide attempt and regular marijuana use she is not a candidate for chronic opioid therapy  She states she has been working hard on managing her depression and feels she is in a good place currently      My impressions and treatment recommendations were discussed in detail with the patient who verbalized understanding and had no further questions  Discharge instructions were provided  I personally saw and examined the patient and I agree with the above discussed plan of care

## 2019-07-17 RX ORDER — TRAMADOL HYDROCHLORIDE 50 MG/1
100 TABLET ORAL EVERY 6 HOURS PRN
Qty: 30 TABLET | Refills: 0 | Status: SHIPPED | OUTPATIENT
Start: 2019-07-17 | End: 2019-07-25 | Stop reason: SDUPTHER

## 2019-07-17 NOTE — TELEPHONE ENCOUNTER
Pt called the office saying that she cannot wait until Monday to get her tramadol  She said that pain management is okay with continuing tramadol but their policy does not allow them to prescribe on the first visit  I explained to the patient that Dr Laron Perez is out of the office and even so will not be continuing with prescribing this medication once she is seeing pain management and she understood  She asked that she gets this refilled one more time because her pain is unbearable

## 2019-07-17 NOTE — TELEPHONE ENCOUNTER
Let her know I sent in 30 pills to carry her through until Dr Sumeet Vigil returns -   I checked [place] PMED site

## 2019-07-22 ENCOUNTER — TELEPHONE (OUTPATIENT)
Dept: PAIN MEDICINE | Facility: MEDICAL CENTER | Age: 49
End: 2019-07-22

## 2019-07-22 DIAGNOSIS — J44.9 COPD WITHOUT EXACERBATION (HCC): ICD-10-CM

## 2019-07-22 RX ORDER — BUDESONIDE AND FORMOTEROL FUMARATE DIHYDRATE 160; 4.5 UG/1; UG/1
2 AEROSOL RESPIRATORY (INHALATION) 2 TIMES DAILY
Qty: 10.2 INHALER | Refills: 0 | Status: SHIPPED | OUTPATIENT
Start: 2019-07-22 | End: 2019-09-10 | Stop reason: SDUPTHER

## 2019-07-22 NOTE — TELEPHONE ENCOUNTER
Pt called and left a voicemail in regards to refilling her medications  She said pain management refuses to take over her Tramadol due to her previous history and that she should contact us  I explained to her that we need to ween her off of this medication as we do not feel comfortable prescribing this as well  I made her aware that this is not for long term use  She is understanding of this  She also states her [place] is not helping her at all  She would like to know if there is another suggestion for her at this time  She mentioned she knows the [place] is frowned upon as well so she is open to trying other options for both medications  I advised her to schedule an appt to discuss her medications with Dr Pete Goins  She agreed to this

## 2019-07-22 NOTE — TELEPHONE ENCOUNTER
Pt called stated that she would like to know if [facility] would take over her script for Tramadol 50 mg since her PCP sees that she is now a pt of pain management       Pt can be reached at [phone]

## 2019-07-22 NOTE — TELEPHONE ENCOUNTER
NOE    S/w pt, she is asking if we will take over her Tramadol rx, reviewed OV note from consult and informed pt that we are seeing her for interventional therapy  Pt asked who she should get the Tramadol rx from, RN informed her that she can ask her PCP, but we will not be taking over the rx due to her hx and concurrent medical marijuana use  Pt verbalized understanding

## 2019-07-25 ENCOUNTER — TELEPHONE (OUTPATIENT)
Dept: FAMILY MEDICINE CLINIC | Facility: CLINIC | Age: 49
End: 2019-07-25

## 2019-07-25 DIAGNOSIS — G89.29 OTHER CHRONIC PAIN: ICD-10-CM

## 2019-07-25 RX ORDER — TRAMADOL HYDROCHLORIDE 50 MG/1
100 TABLET ORAL EVERY 8 HOURS PRN
Qty: 120 TABLET | Refills: 0 | Status: SHIPPED | OUTPATIENT
Start: 2019-07-25 | End: 2019-08-24

## 2019-07-25 NOTE — TELEPHONE ENCOUNTER
Pt called and cancelled her appt today with Dr Petersen Foot  She left me a voicemail stating her car will not be fixed and ready for  until after 5pm when it was supposed to be done at noon  She asked if you would be able to refill her tramadol for her anyway    Or give her something to help her sleep  Pt did not mention rescheduling

## 2019-07-25 NOTE — TELEPHONE ENCOUNTER
This is how I want her to take the tramadol now to start the weaning  She currently takes 2 tablets every 6 hours  I want her to now take 2 tablets less every day, however she feels she can manage  In another 1-2 weeks, ill reduce it further but will start here

## 2019-07-29 ENCOUNTER — OFFICE VISIT (OUTPATIENT)
Dept: FAMILY MEDICINE CLINIC | Facility: CLINIC | Age: 49
End: 2019-07-29
Payer: COMMERCIAL

## 2019-07-29 VITALS
OXYGEN SATURATION: 97 % | SYSTOLIC BLOOD PRESSURE: 136 MMHG | RESPIRATION RATE: 18 BRPM | HEART RATE: 110 BPM | DIASTOLIC BLOOD PRESSURE: 84 MMHG | WEIGHT: 168.2 LBS | BODY MASS INDEX: 27.03 KG/M2 | TEMPERATURE: 98.6 F | HEIGHT: 66 IN

## 2019-07-29 DIAGNOSIS — F51.01 PRIMARY INSOMNIA: Primary | ICD-10-CM

## 2019-07-29 DIAGNOSIS — J44.9 COPD WITHOUT EXACERBATION (HCC): ICD-10-CM

## 2019-07-29 DIAGNOSIS — F33.2 SEVERE EPISODE OF RECURRENT MAJOR DEPRESSIVE DISORDER, WITHOUT PSYCHOTIC FEATURES (HCC): ICD-10-CM

## 2019-07-29 PROBLEM — K55.039 ACUTE ISCHEMIC COLITIS (HCC): Status: RESOLVED | Noted: 2019-05-29 | Resolved: 2019-07-29

## 2019-07-29 PROBLEM — R65.10 SIRS (SYSTEMIC INFLAMMATORY RESPONSE SYNDROME) (HCC): Status: RESOLVED | Noted: 2019-05-29 | Resolved: 2019-07-29

## 2019-07-29 PROCEDURE — 99214 OFFICE O/P EST MOD 30 MIN: CPT | Performed by: INTERNAL MEDICINE

## 2019-07-29 PROCEDURE — 3008F BODY MASS INDEX DOCD: CPT | Performed by: INTERNAL MEDICINE

## 2019-07-29 RX ORDER — ESZOPICLONE 2 MG/1
2 TABLET, FILM COATED ORAL
Qty: 30 TABLET | Refills: 0 | Status: SHIPPED | OUTPATIENT
Start: 2019-07-29 | End: 2019-08-23 | Stop reason: SDUPTHER

## 2019-07-29 NOTE — PROGRESS NOTES
Assessment/Plan:     Diagnoses and all orders for this visit:    Primary insomnia  -     eszopiclone (LUNESTA) 2 mg tablet; Take 1 tablet (2 mg total) by mouth daily at bedtime as needed for sleep Take immediately before bedtime  -Discussed different options including Trazodone and Amitriptyline  Would like to try the Lunesta first  See below  COPD without exacerbation ([facility] )  -Currently continues to smoke but does not have a desire to quit currently  Severe episode of recurrent major depressive disorder, without psychotic features ([facility] )    -Will being seeing [facility] and also will be discussing sleep options with them as well  She will continue to try and wean the tramadol as well  Subjective:      Patient ID: Petra Ta is a 46 y o  female  Elin Briceño is here today for a follow up  She has been weaning her Tramadol as instructed and is currently taking 4 tablets a day from 6 tablets initially  She plans on further decreased this to TID next week  Pain is present/bothersome at night but is doing okay  She has been having trouble with sleep  She was taking Ambien and feels that it has not been helping  She has tried OTC measures without any relief  See discussion  The following portions of the patient's history were reviewed and updated as appropriate: allergies, current medications, past family history, past medical history, past social history, past surgical history and problem list     Review of Systems   Constitutional: Negative for chills, fever and unexpected weight change  Respiratory: Negative for cough, chest tightness and shortness of breath  Cardiovascular: Negative for chest pain  Gastrointestinal: Negative for abdominal pain, diarrhea, nausea and vomiting  Genitourinary: Negative for difficulty urinating  Musculoskeletal: Positive for arthralgias and back pain  Negative for myalgias  Neurological: Negative for dizziness and headaches  Psychiatric/Behavioral: Positive for sleep disturbance  Negative for dysphoric mood  The patient is nervous/anxious  Objective:      /84   Pulse (!) 120   Temp 98 6 °F (37 °C)   Resp 18   Ht 5' 6" (1 676 m)   Wt 76 3 kg (168 lb 3 2 oz)   SpO2 97%   BMI 27 15 kg/m²          Physical Exam   Constitutional: She is oriented to person, place, and time  She appears well-developed and well-nourished  No distress  HENT:   Head: Normocephalic and atraumatic  Eyes: Conjunctivae and EOM are normal  Right eye exhibits no discharge  Left eye exhibits no discharge  No scleral icterus  Neck: Normal range of motion  Cardiovascular: Regular rhythm and normal heart sounds  No murmur heard  Pulmonary/Chest: Effort normal and breath sounds normal  No respiratory distress  She has no wheezes  Abdominal: Soft  Bowel sounds are normal  There is no tenderness  Some bruising noted    Musculoskeletal: Normal range of motion  She exhibits no edema  Neurological: She is alert and oriented to person, place, and time  Skin: Skin is warm and dry  She is not diaphoretic  No erythema  Psychiatric: She has a normal mood and affect  Her speech is normal and behavior is normal  Judgment and thought content normal    Vitals reviewed

## 2019-08-02 ENCOUNTER — OFFICE VISIT (OUTPATIENT)
Dept: GASTROENTEROLOGY | Facility: CLINIC | Age: 49
End: 2019-08-02
Payer: COMMERCIAL

## 2019-08-02 VITALS
HEIGHT: 66 IN | WEIGHT: 171 LBS | TEMPERATURE: 97.6 F | SYSTOLIC BLOOD PRESSURE: 112 MMHG | DIASTOLIC BLOOD PRESSURE: 78 MMHG | BODY MASS INDEX: 27.48 KG/M2 | HEART RATE: 103 BPM

## 2019-08-02 DIAGNOSIS — K59.04 CHRONIC IDIOPATHIC CONSTIPATION: ICD-10-CM

## 2019-08-02 DIAGNOSIS — Z72.0 TOBACCO ABUSE: ICD-10-CM

## 2019-08-02 DIAGNOSIS — K21.00 GASTROESOPHAGEAL REFLUX DISEASE WITH ESOPHAGITIS: ICD-10-CM

## 2019-08-02 DIAGNOSIS — K55.9 ISCHEMIC COLITIS (HCC): Primary | ICD-10-CM

## 2019-08-02 PROCEDURE — 99214 OFFICE O/P EST MOD 30 MIN: CPT | Performed by: PHYSICIAN ASSISTANT

## 2019-08-02 RX ORDER — POLYETHYLENE GLYCOL 3350 17 G/17G
17 POWDER, FOR SOLUTION ORAL 2 TIMES DAILY
Qty: 850 G | Refills: 6 | Status: SHIPPED | OUTPATIENT
Start: 2019-08-02 | End: 2022-05-25 | Stop reason: ALTCHOICE

## 2019-08-02 NOTE — PROGRESS NOTES
[facility] - Outpatient Follow-up Note  Tang Syed 46 y o  female MRN: [id number]  Encounter: [id number]          ASSESSMENT AND PLAN:    1  Ischemic colitis  2  Tobacco use   -repeat CT was recommended to assess if the ischemic colitis was improving, she has not yet done this  I recommend scheduling this especially given her mild pain on Monday, fortunately her pain has resolved and she does not have any other symptoms to suggest that she had a recurrence of the ischemic colitis  Her physical exam today is benign, she has no abdominal tenderness    -follow-up with vascular surgery was also recommended, provided her with a referral again today    -again counseled her regarding smoking cessation, she is not yet ready to quit    3  Chronic constipation   -she states she has a long history of constipation and has been using MiraLax for this with good success however she finds this very expensive  Will prescribe this for her and hopefully her insurance will cover it, she can increase the MiraLax to twice daily as she has found this dose more effective in the past    -we discussed the importance of adequate water intake, she was on a low-fiber diet because of the ischemic colitis but at this point has been able to resume a regular diet      4  GERD   -she takes omeprazole 40 mg daily for her reflux and generally has pretty good control, she does have symptoms if she lays down shortly after eating and we discussed that she should sit up for at least 3 hr after eating a meal    -again advised smoking cessation   -reviewed reflux precautions including dietary and lifestyle changes   -she did have an upper endoscopy last year that showed possible short-segment Gonzales's esophagus but fortunately biopsies were normal     Follow-up in the office in a few months to see how she is doing  ____________________________________________________________    SUBJECTIVE:    68-year-old female with past medical history of GERD, COPD, hypertension, degenerative disc disease, depression and anxiety presents to the office for follow-up  She was last seen in , she was admitted early that month with severe ischemic colitis demonstrated on biopsies and did have improvement with conservative management  She reports that she did have some pain on Monday that she believes was related to gas, she did not have any diarrhea or rectal bleeding  She did have rectal bleeding when she had ischemic colitis in the hospital   She states that overall she has been feeling well but she has been struggling with constipation, she states this has been ongoing for many years and she is using MiraLax 17 g daily but finds it difficult to afford this  She states that MiraLax does work well for her especially when she can take it twice a day  She states she is not ready to quit smoking at this time  She feels that her reflux is generally under good control on omeprazole 40 mg daily, she does have symptoms if she lays down shortly after eating  She had a colonoscopy for screening purposes in 2018 with 1 polyp removed which was a tubular adenoma, she then had a colonoscopy in the hospital when she was diagnosed with ischemic colitis  REVIEW OF SYSTEMS IS OTHERWISE NEGATIVE  Historical Information   Past Medical History:   Diagnosis Date    Chronic pain disorder     Depression     GERD (gastroesophageal reflux disease)     History of electroconvulsive therapy     Low back pain     Self-injurious behavior     Suicide attempt [facility])      Past Surgical History:   Procedure Laterality Date     SECTION      COLONOSCOPY      PANCREAS SURGERY      "pseudocysts" per patient's  Ricki Parksdiaz    [place] ESOPHAGOGASTRODUODENOSCOPY TRANSORAL DIAGNOSTIC N/A 4/10/2018    Procedure: EGD AND COLONOSCOPY;  Surgeon: Amy Eldridge MD;  [facility];   Service: Gastroenterology     Social History   Social History Substance and Sexual Activity   Alcohol Use Yes    Frequency: Monthly or less    Comment: vodka and beer, has decreased to occasionally     Social History     Substance and Sexual Activity   Drug Use Yes    Types: Marijuana    Comment: medical     Social History     Tobacco Use   Smoking Status Current Every Day Smoker    Packs/day: 1 00    Types: Cigarettes   Smokeless Tobacco Current User     Family History   Problem Relation Age of Onset    Arthritis Mother     Coronary artery disease Mother     Colon cancer Family     Parkinsonism Father     Parkinsonism Paternal Grandmother     Coronary artery disease Paternal Grandfather     Depression Neg Hx        Meds/Allergies       Current Outpatient Medications:     budesonide-formoterol (SYMBICORT) 160-4 5 mcg/act inhaler    butalbital-acetaminophen-caffeine (FIORICET,ESGIC) -40 mg per tablet    eszopiclone (LUNESTA) 2 mg tablet    gabapentin (NEURONTIN) 400 mg capsule    lisinopril (ZESTRIL) 20 mg tablet    mirtazapine (REMERON) 30 mg tablet    omeprazole (PriLOSEC) 40 MG capsule    ondansetron (ZOFRAN) 4 mg tablet    ranitidine (ZANTAC) 300 MG tablet    traMADol (ULTRAM) 50 mg tablet    venlafaxine (EFFEXOR-XR) 150 mg 24 hr capsule    baclofen 10 mg tablet    Allergies   Allergen Reactions    Chantix [Varenicline]     Ibuprofen Other (See Comments)    Penicillins Other (See Comments)     ? hives    Sulfa Antibiotics Other (See Comments)     sloughing skin in mouth    Sulfasalazine            Objective     Blood pressure 112/78, pulse 103, temperature 97 6 °F (36 4 °C), temperature source Tympanic, height 5' 6" (1 676 m), weight 77 6 kg (171 lb)  PHYSICAL EXAM:      Physical Exam   Constitutional: She is oriented to person, place, and time  She appears well-developed and well-nourished  No distress  Smells of cigarette smoke   HENT:   Head: Normocephalic and atraumatic  Eyes: Right eye exhibits no discharge   Left eye exhibits no discharge  No scleral icterus  Neck: Neck supple  No tracheal deviation present  Cardiovascular: Normal rate, regular rhythm and normal heart sounds  Exam reveals no gallop and no friction rub  No murmur heard  Pulmonary/Chest: Effort normal  No respiratory distress  She has no wheezes  She has no rales  Abdominal: Soft  Bowel sounds are normal  She exhibits no distension  There is no tenderness  There is no rebound and no guarding  Neurological: She is alert and oriented to person, place, and time  Skin: Skin is warm and dry  Psychiatric: She has a normal mood and affect  Lab Results:   No visits with results within 1 Day(s) from this visit     Latest known visit with results is:   Admission on 05/29/2019, Discharged on 06/03/2019   Component Date Value    WBC 05/29/2019 18 95*    RBC 05/29/2019 4 72     Hemoglobin 05/29/2019 15 0     Hematocrit 05/29/2019 46 7*    MCV 05/29/2019 99*    MCH 05/29/2019 31 8     MCHC 05/29/2019 32 1     RDW 05/29/2019 14 6     MPV 05/29/2019 9 9     Platelets 00/84/8745 341     nRBC 05/29/2019 0     Neutrophils Relative 05/29/2019 86*    Immat GRANS % 05/29/2019 1     Lymphocytes Relative 05/29/2019 8*    Monocytes Relative 05/29/2019 5     Eosinophils Relative 05/29/2019 0     Basophils Relative 05/29/2019 0     Neutrophils Absolute 05/29/2019 16 28*    Immature Grans Absolute 05/29/2019 0 09     Lymphocytes Absolute 05/29/2019 1 60     Monocytes Absolute 05/29/2019 0 89     Eosinophils Absolute 05/29/2019 0 02     Basophils Absolute 05/29/2019 0 07     Sodium 05/29/2019 134*    Potassium 05/29/2019 4 2     Chloride 05/29/2019 97*    CO2 05/29/2019 27     ANION GAP 05/29/2019 10     BUN 05/29/2019 13     Creatinine 05/29/2019 1 16     Glucose 05/29/2019 150*    Calcium 05/29/2019 9 7     AST 05/29/2019 21     ALT 05/29/2019 21     Alkaline Phosphatase 05/29/2019 120*    Total Protein 05/29/2019 7 7     Albumin 05/29/2019 3 6     Total Bilirubin 05/29/2019 0 51     eGFR 05/29/2019 55     Lipase 05/29/2019 49*    LACTIC ACID 05/29/2019 1 6     Blood Culture 05/29/2019 No Growth After 5 Days   Blood Culture 05/29/2019 No Growth After 5 Days       EXT PREG TEST UR (Ref: N* 05/29/2019 Negative     Color, UA 05/29/2019 Yellow     Clarity, UA 05/29/2019 Clear     pH, UA 05/29/2019 7 5     Leukocytes, UA 05/29/2019 Negative     Nitrite, UA 05/29/2019 Negative     Protein, UA 05/29/2019 Negative     Glucose, UA 05/29/2019 Negative     Ketones, UA 05/29/2019 Negative     Urobilinogen, UA 05/29/2019 0 2     Bilirubin, UA 05/29/2019 Negative     Blood, UA 05/29/2019 Small*    [place], UA 05/29/2019 1 010     RBC, UA 05/29/2019 0-1*    WBC, UA 05/29/2019 2-4*    Epithelial Cells 05/29/2019 Moderate*    Bacteria, UA 05/29/2019 Occasional     Fecal Occult Blood Diagn* 05/29/2019 Positive*    Sodium 05/30/2019 138     Potassium 05/30/2019 3 8     Chloride 05/30/2019 103     CO2 05/30/2019 27     ANION GAP 05/30/2019 8     BUN 05/30/2019 7     Creatinine 05/30/2019 0 80     Glucose 05/30/2019 104     Calcium 05/30/2019 8 9     eGFR 05/30/2019 86     Magnesium 05/30/2019 1 8     WBC 05/30/2019 15 46*    RBC 05/30/2019 4 12     Hemoglobin 05/30/2019 13 1     Hematocrit 05/30/2019 41 7     MCV 05/30/2019 101*    MCH 05/30/2019 31 8     MCHC 05/30/2019 31 4     RDW 05/30/2019 15 0     MPV 05/30/2019 10 0     Platelets 60/01/2782 256     nRBC 05/30/2019 0     Neutrophils Relative 05/30/2019 79*    Immat GRANS % 05/30/2019 0     Lymphocytes Relative 05/30/2019 16     Monocytes Relative 05/30/2019 5     Eosinophils Relative 05/30/2019 0     Basophils Relative 05/30/2019 0     Neutrophils Absolute 05/30/2019 12 05*    Immature Grans Absolute 05/30/2019 0 05     Lymphocytes Absolute 05/30/2019 2 49     Monocytes Absolute 05/30/2019 0 77     Eosinophils Absolute 05/30/2019 0 04     Basophils Absolute 05/30/2019 0 06     Case Report 05/30/2019                      Value:Surgical Pathology Report                         Case: [id number]                                   Authorizing Provider:  Amena Biggs MD           Collected:           05/30/2019 1619              Ordering [place]     [facility]        Received:            05/30/2019 [address]                                                            Pathologist:           Cathy Alfonso MD                                                         Specimen:    Large Intestine, Left/Descending Colon, bx, colitis                                        Final Diagnosis 05/30/2019                      Value: This result contains rich text formatting which cannot be displayed here   Additional Information 05/30/2019                      Value: This result contains rich text formatting which cannot be displayed here  Kimberly Silence Gross Description 05/30/2019                      Value: This result contains rich text formatting which cannot be displayed here      WBC 05/31/2019 11 58*    RBC 05/31/2019 4 12     Hemoglobin 05/31/2019 13 0     Hematocrit 05/31/2019 42 4     MCV 05/31/2019 103*    MCH 05/31/2019 31 6     MCHC 05/31/2019 30 7*    RDW 05/31/2019 14 8     Platelets 60/50/4920 226     MPV 05/31/2019 10 3     Sodium 05/31/2019 141     Potassium 05/31/2019 3 7     Chloride 05/31/2019 107     CO2 05/31/2019 22     ANION GAP 05/31/2019 12     BUN 05/31/2019 6     Creatinine 05/31/2019 0 91     Glucose 05/31/2019 96     Calcium 05/31/2019 9 3     eGFR 05/31/2019 74     Sodium 06/01/2019 143     Potassium 06/01/2019 3 4*    Chloride 06/01/2019 108     CO2 06/01/2019 24     ANION GAP 06/01/2019 11     BUN 06/01/2019 3*    Creatinine 06/01/2019 0 77     Glucose 06/01/2019 92     Calcium 06/01/2019 8 9     eGFR 06/01/2019 90     WBC 06/01/2019 8 20     RBC 06/01/2019 3 82     Hemoglobin 06/01/2019 12 0     Hematocrit 06/01/2019 38 5     MCV 06/01/2019 101*    MCH 06/01/2019 31 4     MCHC 06/01/2019 31 2*    RDW 06/01/2019 14 7     MPV 06/01/2019 10 3     Platelets 43/45/3160 216     nRBC 06/01/2019 0     Neutrophils Relative 06/01/2019 70     Immat GRANS % 06/01/2019 0     Lymphocytes Relative 06/01/2019 22     Monocytes Relative 06/01/2019 5     Eosinophils Relative 06/01/2019 2     Basophils Relative 06/01/2019 1     Neutrophils Absolute 06/01/2019 5 67     Immature Grans Absolute 06/01/2019 0 02     Lymphocytes Absolute 06/01/2019 1 84     Monocytes Absolute 06/01/2019 0 44     Eosinophils Absolute 06/01/2019 0 17     Basophils Absolute 06/01/2019 0 06     Sodium 06/02/2019 142     Potassium 06/02/2019 3 6     Chloride 06/02/2019 106     CO2 06/02/2019 26     ANION GAP 06/02/2019 10     BUN 06/02/2019 4*    Creatinine 06/02/2019 0 79     Glucose 06/02/2019 105     Calcium 06/02/2019 9 3     eGFR 06/02/2019 88          Radiology Results:   No results found

## 2019-08-15 ENCOUNTER — PROCEDURE VISIT (OUTPATIENT)
Dept: PAIN MEDICINE | Facility: MEDICAL CENTER | Age: 49
End: 2019-08-15
Payer: COMMERCIAL

## 2019-08-15 DIAGNOSIS — G58.8 INTERCOSTAL NEURALGIA: Primary | ICD-10-CM

## 2019-08-15 DIAGNOSIS — K21.00 ESOPHAGITIS, REFLUX: ICD-10-CM

## 2019-08-15 PROCEDURE — 64421 NJX AA&/STRD NTRCOST NRV EA: CPT

## 2019-08-15 PROCEDURE — 76942 ECHO GUIDE FOR BIOPSY: CPT

## 2019-08-15 RX ORDER — OMEPRAZOLE 40 MG/1
CAPSULE, DELAYED RELEASE ORAL
Qty: 90 CAPSULE | Refills: 0 | Status: SHIPPED | OUTPATIENT
Start: 2019-08-15 | End: 2019-11-15 | Stop reason: SDUPTHER

## 2019-08-15 RX ORDER — METHYLPREDNISOLONE ACETATE 40 MG/ML
40 INJECTION, SUSPENSION INTRA-ARTICULAR; INTRALESIONAL; INTRAMUSCULAR; SOFT TISSUE ONCE
Status: COMPLETED | OUTPATIENT
Start: 2019-08-15 | End: 2019-08-15

## 2019-08-15 RX ADMIN — METHYLPREDNISOLONE ACETATE 40 MG: 40 INJECTION, SUSPENSION INTRA-ARTICULAR; INTRALESIONAL; INTRAMUSCULAR; SOFT TISSUE at 12:58

## 2019-08-15 NOTE — PROGRESS NOTES
Indication:  Rib pain  Preprocedure diagnosis:  Intercostal neuralgia  Postprocedure diagnosis:  Intercostal neuralgia    Procedure: Ultrasound-guided RIGHT- intercostal nerve block(s), total of 2 blocks performed    After discussing the risks, benefits, and alternatives to the procedure, the patient expressed understanding and wished to proceed  The patient was brought to the procedure suite and placed in the supine position  A procedural pause was conducted to verify:  correct patient identity, procedure to be performed and as applicable, correct side and site, correct patient position, and availability of implants, special equipment or special requirements  A simple surgical tray was used  A simple surgical tray was used  Prior to the procedure, the posterior thorax was examined with a 12 MHz linear transducer to visualize the ribs and lung tissue and determine the optimal needle path  Following this, the area was prepared with a ChloraPrep scrub, then re-examined using the same transducer, a sterile ultrasound transducer cover, and sterile ultrasound transducer gel  Thereafter, using ultrasound guidance, a 2 5 inch 25-gauge needle was advanced to the inferior portion of the rib to block the associated intercostal nerve  After visualization of the tip near the nerve and negative aspiration for blood, a mixture of 40 mg of Depo-Medrol in 3 mL of 0 25% bupivacaine was injected along the intercostal nerve  Following the injection, the needle was withdrawn  The procedure was then repeated at the next rib level in the exact same fashion  The patient tolerated the procedure well and there were no apparent complications  After an appropriate amount of observation, the patient was dismissed from the clinic in good condition under their own power

## 2019-08-22 ENCOUNTER — TELEPHONE (OUTPATIENT)
Dept: PAIN MEDICINE | Facility: CLINIC | Age: 49
End: 2019-08-22

## 2019-08-23 DIAGNOSIS — F51.01 PRIMARY INSOMNIA: ICD-10-CM

## 2019-08-23 DIAGNOSIS — K21.00 ESOPHAGITIS, REFLUX: ICD-10-CM

## 2019-08-23 RX ORDER — ESZOPICLONE 2 MG/1
2 TABLET, FILM COATED ORAL
Qty: 30 TABLET | Refills: 0 | Status: SHIPPED | OUTPATIENT
Start: 2019-08-23 | End: 2019-09-30 | Stop reason: SDUPTHER

## 2019-08-23 RX ORDER — ONDANSETRON 4 MG/1
4 TABLET, FILM COATED ORAL DAILY PRN
Qty: 90 TABLET | Refills: 0 | Status: SHIPPED | OUTPATIENT
Start: 2019-08-23 | End: 2019-11-26 | Stop reason: SDUPTHER

## 2019-08-23 NOTE — TELEPHONE ENCOUNTER
PDMP for Lunesta 7/29   PDMP for Tramadol 120 quantity on 7/25    Pt also said she was confused about the tramadol taper, she thought you'd be giving it to her to cut down by each month    When I told her that was not correct and explained that is why she received the bigger quantity she apologized and said she's been taking 4 tablets a day and has run out  I read her your last note and how she verbally expressed understanding to this agreement she apologized again for not following through  Pt was wondering if she can get more to finish tapering to get completely off this medication  She got the nerve block with pain management and feels no relief, she will be following up with them next Friday  I asked her about her recent visit to [facility] and she said that she's been trying to get disability for awhile and [facility] to the primary care doctor that she has seen throughout the majority of her back and rib trauma  That is why she went and promised nothing else was discussed  I explained that Dr Shilpa Nguyễn will not be filling out disability forms either and that is something she can discuss with pain management if she is interested in that still  She likes you and will continue with you and she can only have one PCP  Please advise if this is okay to refill  [place]

## 2019-08-29 ENCOUNTER — TELEPHONE (OUTPATIENT)
Dept: PAIN MEDICINE | Facility: CLINIC | Age: 49
End: 2019-08-29

## 2019-09-04 ENCOUNTER — OFFICE VISIT (OUTPATIENT)
Dept: PAIN MEDICINE | Facility: MEDICAL CENTER | Age: 49
End: 2019-09-04
Payer: COMMERCIAL

## 2019-09-04 VITALS
DIASTOLIC BLOOD PRESSURE: 76 MMHG | SYSTOLIC BLOOD PRESSURE: 114 MMHG | HEART RATE: 110 BPM | WEIGHT: 172.6 LBS | RESPIRATION RATE: 16 BRPM | HEIGHT: 66 IN | BODY MASS INDEX: 27.74 KG/M2

## 2019-09-04 DIAGNOSIS — G58.8 INTERCOSTAL NEURALGIA: Primary | ICD-10-CM

## 2019-09-04 PROCEDURE — 99213 OFFICE O/P EST LOW 20 MIN: CPT | Performed by: NURSE PRACTITIONER

## 2019-09-04 NOTE — PROGRESS NOTES
Assessment  1  Intercostal neuralgia        Plan  At this time I do not think it would be beneficial to move forward with repeating her intercostal nerve blocks as it did not help the cramping pain that she experiences daily  She has trialed lidocaine patches and Lidocaine cream and she already takes gabapentin  I did offer the patient Voltaren gel that she can apply to the painful area up to 4 times daily  Patient was agreeable and then asked if we could just provide her with tramadol because that is the only thing that decreased her pain symptoms  This was being prescribed by her family doctor  I did explain to the patient that we are only treating her with interventional  Treatments and nonnarcotic medications  She can discuss this with her family doctor and let them know that we do not have an opioid contract with her and if they feel it is reasonable to prescribe her the tramadol they can  Patient tells me that her family doctor told her she would not continue to prescribe 1 she saw pain management  Patient is questioning why we will not prescribe tramadol even though this was discussed with her via telephone previously due to the fact that she does use medical marijuana and she has had a past suicide attempt  Patient tells me that the medical marijuana does not work and just because she has the card we should still be able to prescribe tramadol  I explained that this office will not be prescribing her any type of narcotic pain medication  I did provide her with a list of other area providers that she could contact to see if they will be willing to prescribe her tramadol  Follow up on an as-needed basis          [place] Prescription Drug Monitoring Program report was reviewed and was appropriate         My impressions and treatment recommendations were discussed in detail with the patient who verbalized understanding and had no further questions  Discharge instructions were provided   I personally saw and examined the patient and I agree with the above discussed plan of care  No orders of the defined types were placed in this encounter  New Medications Ordered This Visit   Medications    diclofenac sodium (VOLTAREN) 1 %     Sig: Apply 2 g topically 4 (four) times a day     Dispense:  1 Tube     Refill:  0       History of Present Illness    Reinier Hinkle is a 44 y o  female presents for follow-up regarding her right-sided intercostal neuralgia  Today she rates her pain 6/10 this is constant and most bothersome in the morning and at night  She describes her pain as sharp, and cramping  She is status post right-sided intercostal nerve blocks with Dr Daniella Park on August 15, 2019  She tells me that these did not provide any improvement in the cramping pain she experiences daily  I have personally reviewed and/or updated the patient's past medical history, past surgical history, family history, social history, current medications, allergies, and vital signs today  Review of Systems   Respiratory: Negative for shortness of breath  Cardiovascular: Negative for chest pain  Gastrointestinal: Negative for constipation, diarrhea, nausea and vomiting  Musculoskeletal: Negative for arthralgias, gait problem, joint swelling and myalgias  Skin: Negative for rash  Neurological: Negative for dizziness, seizures and weakness  All other systems reviewed and are negative  Patient Active Problem List   Diagnosis    Irritable bowel syndrome with diarrhea    Hypertension    Hyperlipidemia    Severe episode of recurrent major depressive disorder, without psychotic features ([facility] [address] )    [facility]    Generalized anxiety disorder    Right knee pain    History of rib fracture    Tobacco abuse    Chondromalacia patellae    DDD (degenerative disc disease), lumbosacral    Primary insomnia    Overweight (BMI 25 0-29  9)    Hematochezia    Intercostal neuralgia       Past Medical History:   Diagnosis Date    Chronic pain disorder     Depression     GERD (gastroesophageal reflux disease)     History of electroconvulsive therapy     Low back pain     Self-injurious behavior     Suicide attempt [facility])        Past Surgical History:   Procedure Laterality Date     SECTION      COLONOSCOPY      PANCREAS SURGERY      "pseudocysts" per patient's  Ricki Olson    [place] ESOPHAGOGASTRODUODENOSCOPY TRANSORAL DIAGNOSTIC N/A 4/10/2018    Procedure: EGD AND COLONOSCOPY;  Surgeon: Alice Herron MD;  [facility]; Service: Gastroenterology       Family History   Problem Relation Age of Onset    Arthritis Mother     Coronary artery disease Mother     Colon cancer Family     Parkinsonism Father     Parkinsonism Paternal Grandmother     Coronary artery disease Paternal Grandfather     Depression Neg Hx        Social History     Occupational History    Occupation: disability   Tobacco Use    Smoking status: Current Every Day Smoker     Packs/day: 1 00     Types: Cigarettes    Smokeless tobacco: Current User   Substance and Sexual Activity    Alcohol use: Yes     Frequency: Monthly or less     Comment: vodka and beer, has decreased to occasionally    Drug use: Yes     Types: Marijuana     Comment: medical    Sexual activity: Yes     Partners: Male     Comment: PT is        Current Outpatient Medications on File Prior to Visit   Medication Sig    baclofen 10 mg tablet Take 10 mg by mouth 3 (three) times a day as needed    budesonide-formoterol (SYMBICORT) 160-4 5 mcg/act inhaler Inhale 2 puffs 2 (two) times a day Rinse mouth after use      butalbital-acetaminophen-caffeine (FIORICET,ESGIC) -40 mg per tablet Take 1 tablet by mouth every 12 (twelve) hours as needed for headaches    eszopiclone (LUNESTA) 2 mg tablet Take 1 tablet (2 mg total) by mouth daily at bedtime as needed for sleep Take immediately before bedtime    gabapentin (NEURONTIN) 400 mg capsule Take 1 capsule (400 mg total) by mouth 3 (three) times a day    lisinopril (ZESTRIL) 20 mg tablet Take 1 tablet (20 mg total) by mouth daily    mirtazapine (REMERON) 30 mg tablet Take 1 tablet (30 mg total) by mouth daily at bedtime    omeprazole (PriLOSEC) 40 MG capsule TAKE 1 CAPSULE BY MOUTH EVERY DAY IN THE MORNING    ondansetron (ZOFRAN) 4 mg tablet Take 1 tablet (4 mg total) by mouth daily as needed for nausea or vomiting    polyethylene glycol (GLYCOLAX) powder Take 17 g by mouth 2 (two) times a day    ranitidine (ZANTAC) 300 MG tablet TAKE 1 TABLET BY MOUTH EVERY DAY AT BEDTIME    venlafaxine (EFFEXOR-XR) 150 mg 24 hr capsule Take 1 capsule (150 mg total) by mouth daily     No current facility-administered medications on file prior to visit  Allergies   Allergen Reactions    Chantix [Varenicline]     Ibuprofen Other (See Comments)    Penicillins Other (See Comments)     ? hives    Sulfa Antibiotics Other (See Comments)     sloughing skin in mouth    Sulfasalazine        Physical Exam    /76   Pulse (!) 110   Resp 16   Ht 5' 6" (1 676 m)   Wt 78 3 kg (172 lb 9 6 oz)   BMI 27 86 kg/m²     Constitutional: normal, well developed, well nourished, alert, in no distress and non-toxic and no overt pain behavior    Eyes: anicteric  HEENT: grossly intact  Neck: supple, symmetric, trachea midline and no masses   Pulmonary:even and unlabored  Cardiovascular:No edema or pitting edema present  Skin:Normal without rashes or lesions and well hydrated  Psychiatric:Mood and affect appropriate  Neurologic:Cranial Nerves II-XII grossly intact  Musculoskeletal:normal    Imaging

## 2019-09-09 ENCOUNTER — TELEPHONE (OUTPATIENT)
Dept: PAIN MEDICINE | Facility: MEDICAL CENTER | Age: 49
End: 2019-09-09

## 2019-09-09 NOTE — TELEPHONE ENCOUNTER
Call from patients , Ricki  Ph# [facility] states dr Shukri Andre could not prescribe tramadol because patient has a marijuana card and also the procedure she had done in the past did not work  Caller is asking what are her other options?

## 2019-09-09 NOTE — TELEPHONE ENCOUNTER
RN s/w pt who gave permission to speak with her  Ricki Lo Ricki, he was [facility], but stated that she was taking tramadol in the past ans wanted to stop taking the tramadol even though it was helping her  [place] [facility] that she should be on medical marijuana  Per Ricki she was put on this which really does nothing for her pain  Pt also takes CBD oil capsules and nothing is helping her pain  Per Ricki he was told by his that [facility] will not prescribe tramadol while on medical marijuana  Ricki wanting to know if this is a "law" or a policy per [facility]  Per Ricki he does not know where else to turn or what else to do for his wife  Per Ricki the pt is lying in bed and can't even get up without the pain being excruciating  Pt's pain is right sided rib pain caused by old injury of broken ribs that never healed  RN asked if pt able to take any other medications such as NSAIDS or nerve insulating medications  Per Ricki his wife is only allergic to sulfa medications  He would like to know what else he can do for his wife as the nerve block also did not help  Ricki aware that AO is out of the office until 9/12 and I will send this to [address] for recs and suggestions for same  Pt aware that he can take pt to ER, and stated would do so if it becomes necessary, however he has a 700$ copay every time he goes, and his wife's illness is starting to effect their income as well  --please advise thank you--  Any other medications that can be ordered to help with pt's pain?

## 2019-09-10 DIAGNOSIS — J44.9 COPD WITHOUT EXACERBATION (HCC): ICD-10-CM

## 2019-09-10 NOTE — TELEPHONE ENCOUNTER
Ricki patient's  called checking the status on previous task messages   Please advise, michelle    Call back# [phone]

## 2019-09-10 NOTE — TELEPHONE ENCOUNTER
Pt's  Ricki is calling back and saying it is senseless to take off work and come back in, pay another 30$ co-pay, and be told the same thing as the appointment she just had on 9/4  Is this visit going to be a reassessment? Or is this something that can be discussed on the phone? Please advise   Call back# [phone]    Pt is scheduled w/ AO tomorrow and  would like a call back confirming if there is something that is going to be done for patient during this o/v

## 2019-09-11 RX ORDER — BUDESONIDE AND FORMOTEROL FUMARATE DIHYDRATE 160; 4.5 UG/1; UG/1
2 AEROSOL RESPIRATORY (INHALATION) 2 TIMES DAILY
Qty: 10.2 INHALER | Refills: 3 | Status: SHIPPED | OUTPATIENT
Start: 2019-09-11 | End: 2020-03-27 | Stop reason: SDUPTHER

## 2019-09-11 NOTE — TELEPHONE ENCOUNTER
Patient does not need an OV, please cancel  The only thing she wants us to give her is tramadol  I sent diclofenac gel for her at her last visit and provided her a list of other pain providers

## 2019-09-11 NOTE — TELEPHONE ENCOUNTER
S/w spouse, Lupe Shields - states he has requested numerous times to please contact him only  Per Mr Cesar Phillips who requested me to provide details in note, since there are things being told or said that were not  Process started for consult 5/24/19  After record review on 6/10/19 Dr Kyra Peacock would consult for [facility] for interventional approaches only  Pt and spouse agreed  Consult was on 7/11/19  Procedure in office- 8/15/19 RT INTERCOSTAL NERVE BLOCK    Per Mr Robison, everything has gone downhill for his wife, she went from walking to not being able to walk  States he "never told anyone in [address] office they only wanted tramadol"  He believes that it is not illegal to be on medical marijuana and take tramadol as told by AO; Dr Kyra Peacock was aware of this at the consult"    Per spouse, he has already contacted Ravindra Purcell's office to file a formal complaint because he feels "they are just being yanked around and not being heard"  They are not looking for 'tramadol' they are requesting Dr Kyra Peacock to review and discuss with Practice Admin and call him  He (and they) no not want to speak with a nurse or AO "  Mr Olu Garcia, canceled today's appt, which he never requested  - nurse aware  Email sent to practice admin:    I just completed speaking with Lupe Shields, spouse of Gil Angela 1970, who is the patient of Dr Kyra Peacock and Jayde Tenorio  Mr Cesar Phillips would like to speak directly with you in regard to his wifes case  I have updated her EHR  In Britt absence if you can contact him today or tomorrow morning that would be greatly appreciated; since he wants to come to some resolution on his wifes care  Per Mr Cesar Phillips, he is waiting for a call back [facility] office and has consulted an       cb # to Lupe Shields only [phone]- states if he does not  he will return the call within the hour it is left

## 2019-09-11 NOTE — TELEPHONE ENCOUNTER
Aware  She did not receive relief from her nerve block so we discussed that would not be repeated  Lidocaine patches and cream offered to patient at office visit which she states she has tried and they do not work  She is already taking gabapentin  We can not add any tricyclics because she already takes effexor  Diclofenac gel was discussed and ordered for her at her office visit  Patient told me at the office visit that tramadol is the only thing that has ever taken the edge off and wanted our office to prescribe her that  As stated in my note she can return to her PCP who originally prescribed,and she was also given a list of other pain practices to see if they were willing to prescribe the tramadol

## 2019-09-11 NOTE — TELEPHONE ENCOUNTER
S/W pt  Advised pt of the same  Pt asking [facility] to call her  to discuss with him  LMOM for pt's  to C/B, C/B # provided

## 2019-09-12 DIAGNOSIS — F33.2 SEVERE EPISODE OF RECURRENT MAJOR DEPRESSIVE DISORDER, WITHOUT PSYCHOTIC FEATURES (HCC): ICD-10-CM

## 2019-09-12 RX ORDER — LISINOPRIL 20 MG/1
TABLET ORAL
Qty: 90 TABLET | Refills: 0 | Status: SHIPPED | OUTPATIENT
Start: 2019-09-12 | End: 2019-09-17 | Stop reason: SDUPTHER

## 2019-09-12 RX ORDER — MIRTAZAPINE 30 MG/1
TABLET, FILM COATED ORAL
Qty: 30 TABLET | Refills: 2 | Status: SHIPPED | OUTPATIENT
Start: 2019-09-12 | End: 2019-12-14 | Stop reason: SDUPTHER

## 2019-09-12 NOTE — TELEPHONE ENCOUNTER
On 9/11/19 at 5:36pm-6:02pm     I called to speak with Devin Snellen Devin Snellen gave me verbal consent to speak with her  Ricki regarding her medical care  Ricki explained his wife's past medical history from broken ribs, to how they transferred all of their care to [address] [place] [address] Place they could find a pain management practice and go through all non operative procedures to find a solution for his wife  PCP wrote a referral to [facility] and tried the nerve block to help with the pain  Pt is thankful, however it was not effective for the pain  Pain has worsened  Previously Devin Snellen could drive, go to the bathroom and grocery shop w/o pain  Now she has trouble passing bowels, getting up and 9/10 was the first day she was able to shower in 4 days  I explained the clinical concern our department has with combining medical marijuana with tramadol and the unstable emotional /mental effects it may have on his wife, given her medical history  Ricki understood the concern and understood that they may need to move to a different practice, but wanted to know what resources could be provided to help his wife during the time-lapse and transition while getting her an appointment at another hospital system  I advised that I would share our conversation with Dr Valdemar Araujo and speak with him tomorrow by the end of the day  9/12/19 from -6:05 - 6:33pm    Returned call to Ricki Robison that I spoke with Dr Valdemar Araujo and he sincerely feels Devin Snellen would be best served by a larger hospital system with more advanced pain management treatment options such as [facility] or [address] [place] [address] Drive  I asked if he had a preference and I would work to call the hospital system and work to expedite an appointment for Devin Snellen  Ricki stated that he still is looking for an option to help with his wife's pain while they are seeking care through another hospital system    I advised that I would speak Dr Valdemar Araujo again tomorrow and ask that   Kelle Harada give Ricki a call in tomorrow afternoon

## 2019-09-13 NOTE — TELEPHONE ENCOUNTER
Discussed situation with patient's  today via phone  Reviewed concerns with chronic opioid therapy given her psychiatric history as well as issues with co-prescribing opioids while patients are using medical marijuana and our practice policies and philosophy here at [facility]  Offered the  an appointment for his wife with me next week to discuss further options for his wife with the understanding that this would not include Tramadol or other opioid medications  He verbalized understanding, agreement, and appreciation for my time and consideration  I let him know my intention is not for him to have to bring his wife somewhere else but to provide the best possible care and least risk to her  He is aware that they can also follow up with her primary care physician and seek out a second opinion if they are not comfortable with the options I give them  I have forwarded a request for the staff to place a double book visit for Tuesday afternoon so that we may discuss things in person with the patient and her

## 2019-09-17 ENCOUNTER — OFFICE VISIT (OUTPATIENT)
Dept: PAIN MEDICINE | Facility: MEDICAL CENTER | Age: 49
End: 2019-09-17
Payer: COMMERCIAL

## 2019-09-17 VITALS
DIASTOLIC BLOOD PRESSURE: 68 MMHG | HEIGHT: 66 IN | SYSTOLIC BLOOD PRESSURE: 105 MMHG | WEIGHT: 172.4 LBS | HEART RATE: 111 BPM | RESPIRATION RATE: 16 BRPM | BODY MASS INDEX: 27.71 KG/M2

## 2019-09-17 DIAGNOSIS — M62.838 MUSCLE SPASM: Primary | ICD-10-CM

## 2019-09-17 PROCEDURE — 99214 OFFICE O/P EST MOD 30 MIN: CPT | Performed by: PHYSICAL MEDICINE & REHABILITATION

## 2019-09-17 RX ORDER — CYCLOBENZAPRINE HCL 10 MG
10 TABLET ORAL 3 TIMES DAILY PRN
Qty: 90 TABLET | Refills: 1 | Status: SHIPPED | OUTPATIENT
Start: 2019-09-17 | End: 2020-02-18

## 2019-09-17 NOTE — PROGRESS NOTES
Assessment  1  Muscle spasm        Plan  1  I had a long detailed discussion with the patient and her  today regarding non opioid medication options  I would like the patient to wean off of Baclofen in favor of trialing cyclobenzaprine  I instructed her to decrease baclofen to twice a day for 3 days and then once a day for 3 days and then to initiate cyclobenzaprine  2  We also discussed the option of transitioning away from gabapentin  She has tried Lyrica in the past and this did not provide relief  We will continue with gabapentin for now and may consider titrating this up in the future  3  I would also like to refer the patient to neurology to see either a neurologist or physiatrist for consultation regarding the option of Botox injections  The patient continues to describe severe muscle spasms and cramping episodes which debilitated her after any activity  I am curious if paralyzing the musculature with Botox my provide some significant benefit for her  4   We also discussed the option of transitioning away from Effexor in favor of Cymbalta  This option would need to be addressed by her psychiatrist as she has been on this medication for quite some time and is stable on it  5  I again reviewed with the patient that chronic opioid therapy is a poor way to manage these symptoms moving forward  She states that tramadol has been effective for her and she has been on this for long period of time  I reviewed with her that in my own professional opinion this puts her at increased risk especially given her history of suicide attempt  6   She would very likely benefit from a comprehensive pain rehabilitation program   These programs are available at [facility], [facility] and [facility]  We do not have these resources here unfortunately but would very likely help her cope with this chronic pain      7   She will follow up with me in 4 weeks to review her status and progress  My impressions and treatment recommendations were discussed in detail with the patient who verbalized understanding and had no further questions  Discharge instructions were provided  I personally saw and examined the patient and I agree with the above discussed plan of care  Orders Placed This Encounter   Procedures    Ambulatory referral to Neurology     Standing Status:   Future     Standing Expiration Date:   9/17/2020     Referral Priority:   Routine     Referral Type:   Consult - AMB     Referral Reason:   Specialty Services Required     Requested Specialty:   Neurology     Number of Visits Requested:   1     Expiration Date:   9/17/2020     No orders of the defined types were placed in this encounter  History of Present Illness    Petra Ta is a 44 y o  female returns in follow-up to review further treatment options  She failed interventional approaches including intercostal nerve blocks and states that she did also receive nerve blocks at [facility] which were ineffective along with trigger point injections that did not improve symptoms  She currently rates her pain as a 7/10 which is worse  She can states the pain is constant but more bothersome the morning and nighttime described as sharp and cramping in the right thorax region  I have personally reviewed and/or updated the patient's past medical history, past surgical history, family history, social history, current medications, allergies, and vital signs today  Review of Systems   Respiratory: Negative for shortness of breath  Cardiovascular: Negative for chest pain  Gastrointestinal: Negative for constipation, diarrhea, nausea and vomiting  Musculoskeletal: Positive for back pain and myalgias  Negative for arthralgias, gait problem and joint swelling  Right ribs   Skin: Negative for rash  Neurological: Positive for weakness  Negative for dizziness and seizures     All other systems reviewed and are negative  Patient Active Problem List   Diagnosis    Irritable bowel syndrome with diarrhea    Hypertension    Hyperlipidemia    Severe episode of recurrent major depressive disorder, without psychotic features ([facility] [address] )    COPD without exacerbation ([facility])    Generalized anxiety disorder    Right knee pain    History of rib fracture    Tobacco abuse    Chondromalacia patellae    DDD (degenerative disc disease), lumbosacral    Primary insomnia    Overweight (BMI 25 0-29  9)    Hematochezia    Intercostal neuralgia       Past Medical History:   Diagnosis Date    Chronic pain disorder     Depression     GERD (gastroesophageal reflux disease)     History of electroconvulsive therapy     Low back pain     Self-injurious behavior     Suicide attempt [facility])        Past Surgical History:   Procedure Laterality Date     SECTION      COLONOSCOPY      PANCREAS SURGERY      "pseudocysts" per patient's  Ricki Olson    [place] ESOPHAGOGASTRODUODENOSCOPY TRANSORAL DIAGNOSTIC N/A 4/10/2018    Procedure: EGD AND COLONOSCOPY;  Surgeon: Dinah Medina MD;  [place] [facility];   Service: Gastroenterology       Family History   Problem Relation Age of Onset    Arthritis Mother     Coronary artery disease Mother     Colon cancer Family     Parkinsonism Father     Parkinsonism Paternal Grandmother     Coronary artery disease Paternal Grandfather     Depression Neg Hx        Social History     Occupational History    Occupation: disability   Tobacco Use    Smoking status: Current Every Day Smoker     Packs/day: 1 00     Types: Cigarettes    Smokeless tobacco: Current User   Substance and Sexual Activity    Alcohol use: Yes     Frequency: Monthly or less     Comment: vodka and beer, has decreased to occasionally    Drug use: Yes     Types: Marijuana     Comment: medical    Sexual activity: Yes     Partners: Male     Comment: PT is        Current Outpatient Medications on File Prior to Visit   Medication Sig    baclofen 10 mg tablet Take 10 mg by mouth 3 (three) times a day as needed    budesonide-formoterol (SYMBICORT) 160-4 5 mcg/act inhaler Inhale 2 puffs 2 (two) times a day Rinse mouth after use   butalbital-acetaminophen-caffeine (FIORICET,ESGIC) -40 mg per tablet Take 1 tablet by mouth every 12 (twelve) hours as needed for headaches    eszopiclone (LUNESTA) 2 mg tablet Take 1 tablet (2 mg total) by mouth daily at bedtime as needed for sleep Take immediately before bedtime    gabapentin (NEURONTIN) 400 mg capsule Take 1 capsule (400 mg total) by mouth 3 (three) times a day    lisinopril (ZESTRIL) 20 mg tablet Take 1 tablet (20 mg total) by mouth daily    mirtazapine (REMERON) 30 mg tablet TAKE 1 TABLET BY MOUTH AT BEDTIME    omeprazole (PriLOSEC) 40 MG capsule TAKE 1 CAPSULE BY MOUTH EVERY DAY IN THE MORNING    ondansetron (ZOFRAN) 4 mg tablet Take 1 tablet (4 mg total) by mouth daily as needed for nausea or vomiting    polyethylene glycol (GLYCOLAX) powder Take 17 g by mouth 2 (two) times a day    ranitidine (ZANTAC) 300 MG tablet TAKE 1 TABLET BY MOUTH EVERY DAY AT BEDTIME    venlafaxine (EFFEXOR-XR) 150 mg 24 hr capsule Take 1 capsule (150 mg total) by mouth daily    diclofenac sodium (VOLTAREN) 1 % Apply 2 g topically 4 (four) times a day (Patient not taking: Reported on 9/17/2019)    [DISCONTINUED] lisinopril (ZESTRIL) 20 mg tablet TAKE 1 TABLET BY MOUTH EVERY DAY     No current facility-administered medications on file prior to visit          Allergies   Allergen Reactions    Chantix [Varenicline]     Ibuprofen Other (See Comments)    Penicillins Other (See Comments)     ? hives    Sulfa Antibiotics Other (See Comments)     sloughing skin in mouth    Sulfasalazine        Physical Exam    /68   Pulse (!) 111   Resp 16   Ht 5' 6" (1 676 m)   Wt 78 2 kg (172 lb 6 4 oz)   BMI 27 83 kg/m²     Constitutional: normal, well developed, well nourished, alert, in no distress and non-toxic and no overt pain behavior  Patient's  present for visit    Eyes: anicteric  HEENT: grossly intact  Neck: supple, symmetric, trachea midline and no masses   Pulmonary:even and unlabored  Cardiovascular:No edema or pitting edema present  Skin:Normal without rashes or lesions and well hydrated  Psychiatric:Mood and affect appropriate, appears upset at times regarding changes in medications  Neurologic:Cranial Nerves II-XII grossly intact  Musculoskeletal:normal    Imaging

## 2019-09-24 ENCOUNTER — OFFICE VISIT (OUTPATIENT)
Dept: FAMILY MEDICINE CLINIC | Facility: CLINIC | Age: 49
End: 2019-09-24
Payer: COMMERCIAL

## 2019-09-24 VITALS
TEMPERATURE: 97.7 F | BODY MASS INDEX: 27.61 KG/M2 | DIASTOLIC BLOOD PRESSURE: 76 MMHG | OXYGEN SATURATION: 97 % | HEART RATE: 120 BPM | WEIGHT: 171.8 LBS | HEIGHT: 66 IN | SYSTOLIC BLOOD PRESSURE: 110 MMHG | RESPIRATION RATE: 22 BRPM

## 2019-09-24 DIAGNOSIS — J01.00 ACUTE NON-RECURRENT MAXILLARY SINUSITIS: Primary | ICD-10-CM

## 2019-09-24 DIAGNOSIS — F33.2 SEVERE EPISODE OF RECURRENT MAJOR DEPRESSIVE DISORDER, WITHOUT PSYCHOTIC FEATURES (HCC): ICD-10-CM

## 2019-09-24 DIAGNOSIS — Z23 NEED FOR INFLUENZA VACCINATION: ICD-10-CM

## 2019-09-24 PROCEDURE — 90471 IMMUNIZATION ADMIN: CPT | Performed by: INTERNAL MEDICINE

## 2019-09-24 PROCEDURE — 99213 OFFICE O/P EST LOW 20 MIN: CPT | Performed by: INTERNAL MEDICINE

## 2019-09-24 PROCEDURE — 90686 IIV4 VACC NO PRSV 0.5 ML IM: CPT | Performed by: INTERNAL MEDICINE

## 2019-09-24 PROCEDURE — 3008F BODY MASS INDEX DOCD: CPT | Performed by: INTERNAL MEDICINE

## 2019-09-24 RX ORDER — VENLAFAXINE HYDROCHLORIDE 150 MG/1
150 CAPSULE, EXTENDED RELEASE ORAL DAILY
Qty: 30 CAPSULE | Refills: 1 | Status: SHIPPED | OUTPATIENT
Start: 2019-09-24 | End: 2019-10-16 | Stop reason: SDUPTHER

## 2019-09-24 RX ORDER — FLUTICASONE PROPIONATE 50 MCG
1 SPRAY, SUSPENSION (ML) NASAL DAILY
Qty: 1 BOTTLE | Refills: 1 | Status: SHIPPED | OUTPATIENT
Start: 2019-09-24 | End: 2022-05-25 | Stop reason: ALTCHOICE

## 2019-09-24 NOTE — PROGRESS NOTES
Assessment/Plan:     Diagnoses and all orders for this visit:    Acute non-recurrent maxillary sinusitis  -     fluticasone (FLONASE) 50 mcg/act nasal spray; 1 spray into each nostril daily  We discussed first starting with symptomatic treatment as she has had a drip for some time  If no improvement in 2-3 days, I asked that she let me know  Severe episode of recurrent major depressive disorder, without psychotic features ([address] )  -     venlafaxine (EFFEXOR-XR) 150 mg 24 hr capsule; Take 1 capsule (150 mg total) by mouth daily  She is still trying to locate a psychiatrist to follow with  There was discussion about switching over to Cymbalta but would like to talk to a psychiatrist first     Need for influenza vaccination  -     influenza vaccine, 7517-3560, quadrivalent, 0 5 mL, preservative-free, for adult and pediatric patients 6 mos+ (AFLURIA, FLUARIX, FLULAVAL, FLUZONE)        Subjective:      Patient ID: Eladio Snowden is a 44 y o  female  Danne Mill is here today with sinus complaints  Reports having a post nasal drip for some time but started noticing thick mucus this last week or so  Denies known sick contacts  Reports trying Tylenol/Sinus OTC with minor relief  The following portions of the patient's history were reviewed and updated as appropriate: allergies, current medications, past family history, past medical history, past social history, past surgical history and problem list     Review of Systems   Constitutional: Negative for chills and fever  HENT: Positive for congestion, postnasal drip and sore throat  Negative for dental problem, sinus pressure and sinus pain  Respiratory: Positive for cough  Negative for shortness of breath  Cardiovascular: Negative for chest pain  Gastrointestinal: Negative for nausea and vomiting  Neurological: Negative for headaches           Objective:      /76   Pulse (!) 120   Temp 97 7 °F (36 5 °C)   Resp 22   Ht 5' 6" (1 676 m)   Wt 77 9 kg (171 lb 12 8 oz)   SpO2 97%   BMI 27 73 kg/m²          Physical Exam   Constitutional: She is oriented to person, place, and time  She appears well-developed and well-nourished  No distress  HENT:   Head: Normocephalic and atraumatic  Right Ear: External ear normal    Left Ear: External ear normal    Mouth/Throat: Posterior oropharyngeal erythema present  Eyes: Conjunctivae and EOM are normal  Right eye exhibits no discharge  Left eye exhibits no discharge  No scleral icterus  Neck: Normal range of motion  Cardiovascular: Regular rhythm and normal heart sounds  No murmur heard  Pulmonary/Chest: Effort normal and breath sounds normal  No respiratory distress  She has no wheezes  Lymphadenopathy:     She has no cervical adenopathy  Neurological: She is alert and oriented to person, place, and time  Skin: Skin is warm and dry  She is not diaphoretic  Psychiatric: She has a normal mood and affect  Her speech is normal and behavior is normal  Judgment and thought content normal    Vitals reviewed

## 2019-09-27 ENCOUNTER — TELEPHONE (OUTPATIENT)
Dept: FAMILY MEDICINE CLINIC | Facility: CLINIC | Age: 49
End: 2019-09-27

## 2019-09-27 DIAGNOSIS — J01.00 ACUTE NON-RECURRENT MAXILLARY SINUSITIS: Primary | ICD-10-CM

## 2019-09-27 RX ORDER — DOXYCYCLINE HYCLATE 100 MG/1
100 CAPSULE ORAL EVERY 12 HOURS SCHEDULED
Qty: 20 CAPSULE | Refills: 0 | Status: SHIPPED | OUTPATIENT
Start: 2019-09-27 | End: 2019-10-07

## 2019-09-27 NOTE — TELEPHONE ENCOUNTER
Pt called the office and left a voicemail stating that her sinus pressure does not feel improved with the flonase  She was wondering if she can have an antibiotic

## 2019-09-30 DIAGNOSIS — F51.01 PRIMARY INSOMNIA: ICD-10-CM

## 2019-09-30 RX ORDER — ESZOPICLONE 2 MG/1
2 TABLET, FILM COATED ORAL
Qty: 30 TABLET | Refills: 0 | Status: SHIPPED | OUTPATIENT
Start: 2019-09-30 | End: 2019-10-29 | Stop reason: SDUPTHER

## 2019-10-16 DIAGNOSIS — F33.2 SEVERE EPISODE OF RECURRENT MAJOR DEPRESSIVE DISORDER, WITHOUT PSYCHOTIC FEATURES (HCC): ICD-10-CM

## 2019-10-16 RX ORDER — VENLAFAXINE HYDROCHLORIDE 150 MG/1
CAPSULE, EXTENDED RELEASE ORAL
Qty: 30 CAPSULE | Refills: 1 | Status: SHIPPED | OUTPATIENT
Start: 2019-10-16 | End: 2019-11-09 | Stop reason: SDUPTHER

## 2019-10-17 DIAGNOSIS — G89.29 OTHER CHRONIC PAIN: ICD-10-CM

## 2019-10-18 RX ORDER — BUTALBITAL, ACETAMINOPHEN AND CAFFEINE 50; 325; 40 MG/1; MG/1; MG/1
1 TABLET ORAL DAILY PRN
Qty: 90 TABLET | Refills: 0 | Status: SHIPPED | OUTPATIENT
Start: 2019-10-18 | End: 2020-03-27 | Stop reason: SDUPTHER

## 2019-10-29 DIAGNOSIS — F51.01 PRIMARY INSOMNIA: ICD-10-CM

## 2019-10-29 RX ORDER — ESZOPICLONE 2 MG/1
2 TABLET, FILM COATED ORAL
Qty: 30 TABLET | Refills: 0 | Status: SHIPPED | OUTPATIENT
Start: 2019-10-29 | End: 2019-11-26 | Stop reason: SDUPTHER

## 2019-11-09 DIAGNOSIS — F33.2 SEVERE EPISODE OF RECURRENT MAJOR DEPRESSIVE DISORDER, WITHOUT PSYCHOTIC FEATURES (HCC): ICD-10-CM

## 2019-11-11 RX ORDER — VENLAFAXINE HYDROCHLORIDE 150 MG/1
CAPSULE, EXTENDED RELEASE ORAL
Qty: 30 CAPSULE | Refills: 1 | Status: SHIPPED | OUTPATIENT
Start: 2019-11-11 | End: 2019-12-10 | Stop reason: SDUPTHER

## 2019-11-15 DIAGNOSIS — K21.00 ESOPHAGITIS, REFLUX: ICD-10-CM

## 2019-11-15 RX ORDER — OMEPRAZOLE 40 MG/1
40 CAPSULE, DELAYED RELEASE ORAL DAILY
Qty: 90 CAPSULE | Refills: 0 | Status: SHIPPED | OUTPATIENT
Start: 2019-11-15 | End: 2020-02-13 | Stop reason: SDUPTHER

## 2019-11-26 DIAGNOSIS — K21.00 ESOPHAGITIS, REFLUX: ICD-10-CM

## 2019-11-26 DIAGNOSIS — F51.01 PRIMARY INSOMNIA: ICD-10-CM

## 2019-11-27 RX ORDER — ESZOPICLONE 2 MG/1
2 TABLET, FILM COATED ORAL
Qty: 30 TABLET | Refills: 0 | Status: SHIPPED | OUTPATIENT
Start: 2019-11-27 | End: 2019-12-30 | Stop reason: SDUPTHER

## 2019-11-27 RX ORDER — ONDANSETRON 4 MG/1
4 TABLET, FILM COATED ORAL DAILY PRN
Qty: 90 TABLET | Refills: 0 | Status: SHIPPED | OUTPATIENT
Start: 2019-11-27 | End: 2020-03-05 | Stop reason: SDUPTHER

## 2019-12-03 ENCOUNTER — TELEPHONE (OUTPATIENT)
Dept: NEUROLOGY | Facility: CLINIC | Age: 49
End: 2019-12-03

## 2019-12-10 DIAGNOSIS — F33.2 SEVERE EPISODE OF RECURRENT MAJOR DEPRESSIVE DISORDER, WITHOUT PSYCHOTIC FEATURES (HCC): ICD-10-CM

## 2019-12-10 RX ORDER — VENLAFAXINE HYDROCHLORIDE 150 MG/1
CAPSULE, EXTENDED RELEASE ORAL
Qty: 30 CAPSULE | Refills: 1 | Status: SHIPPED | OUTPATIENT
Start: 2019-12-10 | End: 2020-01-11

## 2019-12-14 DIAGNOSIS — F33.2 SEVERE EPISODE OF RECURRENT MAJOR DEPRESSIVE DISORDER, WITHOUT PSYCHOTIC FEATURES (HCC): ICD-10-CM

## 2019-12-14 RX ORDER — MIRTAZAPINE 30 MG/1
TABLET, FILM COATED ORAL
Qty: 30 TABLET | Refills: 2 | Status: SHIPPED | OUTPATIENT
Start: 2019-12-14 | End: 2020-02-26 | Stop reason: SDUPTHER

## 2019-12-16 DIAGNOSIS — F33.2 SEVERE EPISODE OF RECURRENT MAJOR DEPRESSIVE DISORDER, WITHOUT PSYCHOTIC FEATURES (HCC): ICD-10-CM

## 2019-12-16 DIAGNOSIS — I10 ESSENTIAL HYPERTENSION: ICD-10-CM

## 2019-12-16 RX ORDER — LISINOPRIL 20 MG/1
20 TABLET ORAL DAILY
Qty: 90 TABLET | Refills: 0 | Status: SHIPPED | OUTPATIENT
Start: 2019-12-16 | End: 2020-04-16 | Stop reason: SDUPTHER

## 2019-12-16 RX ORDER — LISINOPRIL 20 MG/1
TABLET ORAL
Qty: 90 TABLET | Refills: 0 | OUTPATIENT
Start: 2019-12-16

## 2019-12-18 ENCOUNTER — TELEPHONE (OUTPATIENT)
Dept: FAMILY MEDICINE CLINIC | Facility: CLINIC | Age: 49
End: 2019-12-18

## 2019-12-18 DIAGNOSIS — F41.1 GENERALIZED ANXIETY DISORDER: Primary | ICD-10-CM

## 2019-12-18 NOTE — TELEPHONE ENCOUNTER
Pt aware that referral placed to  Psych for her  She was advised to call us back in a week if she has not heard from them

## 2019-12-18 NOTE — TELEPHONE ENCOUNTER
Unfortunately I don't  I know [address] is adding new providers so hopefully there will be more availability

## 2019-12-18 NOTE — TELEPHONE ENCOUNTER
Pt called and left a voicemail in regards to trying to find a psychiatrist, she said that the closest available appt she found is Feb/March and was wondering if we had any suggestions for an opening sooner somewhere else

## 2019-12-30 ENCOUNTER — TELEPHONE (OUTPATIENT)
Dept: FAMILY MEDICINE CLINIC | Facility: CLINIC | Age: 49
End: 2019-12-30

## 2019-12-30 DIAGNOSIS — F51.01 PRIMARY INSOMNIA: ICD-10-CM

## 2019-12-30 NOTE — TELEPHONE ENCOUNTER
called  Pt is not doing well  Holidays have been very difficult and she has not heard back from Essence Capps yet  John Alejo, can someone please call her and get her set up asap?

## 2019-12-30 NOTE — TELEPHONE ENCOUNTER
Pt not doing well   Holidays really hard on her  Still has not heard from Essence Capps  I emailed him some other options re counselors  I also sent a note to our contact at [place] [id number], Raymundo Cutler, to see if she might be able to get pt scheduled

## 2019-12-31 RX ORDER — ESZOPICLONE 2 MG/1
2 TABLET, FILM COATED ORAL
Qty: 30 TABLET | Refills: 0 | Status: SHIPPED | OUTPATIENT
Start: 2019-12-31 | End: 2020-01-27 | Stop reason: SDUPTHER

## 2020-01-08 DIAGNOSIS — R10.84 ABDOMINAL CRAMPING, GENERALIZED: Primary | ICD-10-CM

## 2020-01-08 RX ORDER — DICYCLOMINE HCL 20 MG
20 TABLET ORAL EVERY 6 HOURS
Qty: 60 TABLET | Refills: 0 | Status: SHIPPED | OUTPATIENT
Start: 2020-01-08 | End: 2020-04-27 | Stop reason: ALTCHOICE

## 2020-01-08 NOTE — TELEPHONE ENCOUNTER
Patients GI provider:  Dr Danika Sosa    Number to return call: ( [phone]    Reason for call: Pt calling to ask question about pain medication for cramping    Scheduled procedure/appointment date if applicable: Apt/procedure 2-4-20

## 2020-01-08 NOTE — TELEPHONE ENCOUNTER
History of:  Ischemic colitis, constipation, GERD  Spoke with patient and she is having painful abdominal cramping  She has not had a BM for 3-4 days, then today she has watery diarrhea  Advised she may have the cramping due to her constipation  Suggested she continue the Miralax and we can order Bentyl for the cramping  Aslo advised that it can slow her system down which can cause constipation  She understands  Please order Bentyl

## 2020-01-11 DIAGNOSIS — F33.2 SEVERE EPISODE OF RECURRENT MAJOR DEPRESSIVE DISORDER, WITHOUT PSYCHOTIC FEATURES (HCC): ICD-10-CM

## 2020-01-11 RX ORDER — VENLAFAXINE HYDROCHLORIDE 150 MG/1
CAPSULE, EXTENDED RELEASE ORAL
Qty: 30 CAPSULE | Refills: 0 | Status: SHIPPED | OUTPATIENT
Start: 2020-01-11 | End: 2020-02-13 | Stop reason: SDUPTHER

## 2020-01-16 ENCOUNTER — TELEPHONE (OUTPATIENT)
Dept: PSYCHIATRY | Facility: CLINIC | Age: 50
End: 2020-01-16

## 2020-01-16 NOTE — TELEPHONE ENCOUNTER
Behavorial Health Outpatient Intake Questions    Referred by:    Please advised interviewee that they need to answer all questions truthfully to allow for best care and any misrepresentations of information may affect their ability to be seen at this clinic   => Was this discussed? Yes     [facility]     Presenting Problem (in patient's words): ANXIETY, MAJOR DEPRESSIVE DISORDER  Has the patient ever seen or is currently seeing a psychiatrist? Yes   If yes who/when? Kirstin MORRIS, [facility]  If seen as outpatient, have they been seen here (and by whom)? If not seen here, which provider(s) did the patient see and for how long? Has the patient ever seen or currently see a therapist? Yes If yes who/when? A YEAR AGO  Has a member of the patient's family been in therapy here? No  If yes, with whom? Has the patient been hospitalized for mental health? Yes   If yes, how long ago was last hospitalization and where was it? MAY 2017, [facility] 3 WEEKS  Substance Abuse: There is a documented history of alcohol abuse confirmed by the patient  3 YEARS     Does the patient have ICM or CTT? No    Is the patient taking injectable psychiatric medications? No    => If yes, patient cannot be seen here  Communications  Are there any developmental disabilities? No    Does the patient have hearing impairment? No       History-    Has the patient served in the [place] [address]? No    If yes, have you had combat services? No    Was the patient activated into federal active duty as a member of the [employer] or reserve? No    Legal History-     Does the patient have any history of arrests, FCI/FDC time, or DUIs? Yes  If Yes-  1) What types of charges? 2) When were they last incarcerated? 3) Are they currently on parole or probation? Minor Child-    Who has custody of the child? Is there a custody agreement?      If there is a custody agreement remind parent that they must bring a copy to the first appt or they will not be seen  Intake Team, please check with provider before scheduling if flags come up such as:  - complex case  - legal history (other than DUI)  - communication barrier concerns are present  - if, in your judgment, this needs further review    ACCEPTED as a patient Yes  => Appointment Date: 01/30/2020 w/ Toro Otoole & 02/05/2020 W/ RADHA ONEILL    Referred Elsewhere? No    Name of Insurance Co: [address] ID# [id number]  Insurance Phone #  If ins is primary or secondary  If patient is a minor, parents information such as Name, D  O B of guarantor

## 2020-01-27 DIAGNOSIS — F51.01 PRIMARY INSOMNIA: ICD-10-CM

## 2020-01-27 RX ORDER — ESZOPICLONE 2 MG/1
2 TABLET, FILM COATED ORAL
Qty: 30 TABLET | Refills: 0 | Status: SHIPPED | OUTPATIENT
Start: 2020-01-27 | End: 2020-02-18

## 2020-01-30 ENCOUNTER — TELEPHONE (OUTPATIENT)
Dept: PSYCHIATRY | Facility: CLINIC | Age: 50
End: 2020-01-30

## 2020-02-03 NOTE — TELEPHONE ENCOUNTER
Pt called back/left msg; I called pt/no answer left msg to contact intake to reschedule appt 2/13/20

## 2020-02-04 ENCOUNTER — TELEPHONE (OUTPATIENT)
Dept: OTHER | Facility: OTHER | Age: 50
End: 2020-02-04

## 2020-02-13 DIAGNOSIS — F33.2 SEVERE EPISODE OF RECURRENT MAJOR DEPRESSIVE DISORDER, WITHOUT PSYCHOTIC FEATURES (HCC): ICD-10-CM

## 2020-02-13 DIAGNOSIS — K21.00 ESOPHAGITIS, REFLUX: ICD-10-CM

## 2020-02-13 RX ORDER — VENLAFAXINE HYDROCHLORIDE 150 MG/1
150 CAPSULE, EXTENDED RELEASE ORAL DAILY
Qty: 30 CAPSULE | Refills: 0 | Status: SHIPPED | OUTPATIENT
Start: 2020-02-13 | End: 2020-02-26 | Stop reason: SDUPTHER

## 2020-02-13 RX ORDER — GABAPENTIN 400 MG/1
400 CAPSULE ORAL 3 TIMES DAILY
Qty: 45 CAPSULE | Refills: 1 | Status: SHIPPED | OUTPATIENT
Start: 2020-02-13 | End: 2020-02-26 | Stop reason: SDUPTHER

## 2020-02-13 RX ORDER — OMEPRAZOLE 40 MG/1
40 CAPSULE, DELAYED RELEASE ORAL DAILY
Qty: 90 CAPSULE | Refills: 0 | Status: SHIPPED | OUTPATIENT
Start: 2020-02-13 | End: 2020-05-07 | Stop reason: SDUPTHER

## 2020-02-13 NOTE — TELEPHONE ENCOUNTER
Pt also wanted to let you know she has a psych appt at the end of this month - plans to get meds from them going forward after her appt

## 2020-02-18 ENCOUNTER — OFFICE VISIT (OUTPATIENT)
Dept: PSYCHIATRY | Facility: CLINIC | Age: 50
End: 2020-02-18
Payer: COMMERCIAL

## 2020-02-18 VITALS — HEART RATE: 111 BPM | DIASTOLIC BLOOD PRESSURE: 54 MMHG | SYSTOLIC BLOOD PRESSURE: 138 MMHG

## 2020-02-18 DIAGNOSIS — F51.02 ADJUSTMENT INSOMNIA: ICD-10-CM

## 2020-02-18 DIAGNOSIS — F41.1 GENERALIZED ANXIETY DISORDER: ICD-10-CM

## 2020-02-18 DIAGNOSIS — F33.2 SEVERE EPISODE OF RECURRENT MAJOR DEPRESSIVE DISORDER, WITHOUT PSYCHOTIC FEATURES (HCC): Primary | ICD-10-CM

## 2020-02-18 PROCEDURE — 90792 PSYCH DIAG EVAL W/MED SRVCS: CPT | Performed by: NURSE PRACTITIONER

## 2020-02-18 PROCEDURE — 4004F PT TOBACCO SCREEN RCVD TLK: CPT | Performed by: NURSE PRACTITIONER

## 2020-02-18 RX ORDER — TRAZODONE HYDROCHLORIDE 50 MG/1
50 TABLET ORAL
Qty: 30 TABLET | Refills: 1 | Status: SHIPPED | OUTPATIENT
Start: 2020-02-18 | End: 2020-04-02

## 2020-02-18 NOTE — BH TREATMENT PLAN
TREATMENT PLAN (Medication Management Only)        [facility]    Name and Date of Birth:  Papo Sarmiento y o  1970  Date of Treatment Plan: February 18, 2020  Diagnosis/Diagnoses:    1  Severe episode of recurrent major depressive disorder, without psychotic features ([address] )    2  Generalized anxiety disorder    3  Adjustment insomnia      Strengths/Personal Resources for Self-Care: supportive family, taking medications as prescribed, ability to listen, sense of humor, strong maynor  Area/Areas of need (in own words): anxiety symptoms, depressive symptoms  1  Long Term Goal: alleviate depression  Target Date: 2 months - 4/18/2020  Person/Persons responsible for completion of goal: Koko Rosenthal  2  Short Term Objective (s) - How will we reach this goal?:   A  Provider new recommended medication/dosage changes and/or continue medication(s): continue current medications as prescribed Effexor, Remeron, Trazodone  B  Attend medication management appointments regularly  C  Attend psychotherapy regularly  Target Date: 1 week - 2/25/2020  Person/Persons Responsible for Completion of Goal: Koko Rosenthal  Progress Towards Goals: starting treatment  Treatment Modality: medication management every 1 week  Review due 90 to 120 days from date of this plan: 4 months - 6/18/2020  Expected length of service: maintenance  My Physician/PA/NP and I have developed this plan together and I agree to work on the goals and objectives  I understand the treatment goals that were developed for my treatment

## 2020-02-18 NOTE — PSYCH
[facility]    Name and Date of Birth:  Mary Overton 44 y o  1970 MRN: [id number]    Date of Visit: February 18, 2020    Reason for visit (CC):  Increased depression    HPI     Vitaliy De La Garza is a 44 y o  female with a history of depression and ect who presents for psychiatric evaluation due to worsening depression  Symptoms first started gradually many years ago and had a variable course over the last 30 years  Stressors preceding visit included Poor physical health, difficult relationship with daughter and mother  Father new diagnosis of Parkinson's  Vitaliy De La Garza reports 8/10 depression, low energy ,difficulty falling and staying asleep, excessive guilt, hopeless, worry about the future and profound depressed mood  Current stressors include poor physical health including chronic rib pain that may be contributing to interrupted sleep  Family conflict between patient and mother, and patient's daughter and patient  Patient's father also has new diagnosis of Parkinson's disease which has been difficult  Patient states she feels frustrated because she has "seen so many doctors and had so many medication changes over the years"  Patient feels that some medications have been helpful but then stop working with time  States Effexor has been the mainstay of treatment that has been very helpful  Patient received ECT October 2018 following May 2018 hospitalization at [facility] for suicide attempt by overdose on lithium and alcohol  Patient encouraged to engage in physical exercise, improved diet and quit smoking as ongoing goals and part of treatment plan to increase energy and decrease depressive symptoms  Patient to schedule with therapist to discuss ongoing difficulties  Patient would like minimum medication changes initially  Plan is to initiate trazodone and discontinue Lunesta for sleep    Patient states trazodone has been previously helpful for sleep  Patient return in 1 week to assess for improvements in sleep and reassessment of depressive symptoms  Patient willing to consider increase in Effexor at next visit  Patient is in agreement with this plan  Psychiatric Review Of Systems:    Sleep changes: yes, decreased  Appetite changes: no  Weight changes: no  Energy/anergy: yes, decreased  Interest/pleasure/anhedonia: yes, decreased  Somatic symptoms: no  Anxiety/panic: no  Dalia: no  Guilty/hopeless: yes, excessive guilt about past and hopelessness for the future  Self injurious behavior/risky behavior: no  Suicidal ideation: no  Homicidal ideation: no  Auditory hallucinations: no  Visual hallucinations: no  Other hallucinations: no  Delusional thinking: no  Eating disorder history: no  Obsessive/compulsive symptoms: no    Review Of Systems:    General relationship problems, emotional problems, sleep disturbances and decreased functioning   Personality no change in personality   Constitutional negative   ENT negative   Cardiovascular tachycardia   Respiratory negative   Gastrointestinal negative   Genitourinary negative   Musculoskeletal negative   Integumentary negative   Neurological negative   Endocrine negative   Other Symptoms none       Past Psychiatric History:     Past Inpatient Psychiatric Treatment:   Multiple past inpatient psychiatric admissions at [address] [place] [address] and [address] [place] [address]  Past Outpatient Psychiatric Treatment:    Was in outpatient psychiatric treatment in the past with a psychiatrist  Was in outpatient psychiatric treatment in the past with a therapist  Past Suicide Attempts: yes, multiple attempts by overdose, And 1 past attempt by cutting    Attempt at 11years of age and 46years of age  Past Violent Behavior: no  Past Psychiatric Medication Trials: multiple psychiatric medication trials, Prozac, Zoloft, Paxil, Effexor, Cymbalta, Amitriptyline/Elavil, Depakote, Tegretol, Lithium, Ativan, Klonopin, Ambien and Lunesta    Traumatic History:     Abuse: no history of physical or sexual abuse  Other Traumatic Events: none     Family Psychiatric History:     Family History   Problem Relation Age of Onset    Arthritis Mother     Coronary artery disease Mother     Colon cancer Family     Parkinsonism Father     Parkinsonism Paternal Grandmother     Coronary artery disease Paternal Grandfather     Depression Neg Hx        Substance Use History:    Social History     Substance and Sexual Activity   Alcohol Use Yes    Frequency: Monthly or less    Comment: vodka and beer, has decreased to occasionally     Social History     Substance and Sexual Activity   Drug Use Yes    Types: Marijuana    Comment: medical       Social History:    Social History     Socioeconomic History    Marital status: /Civil Union     Spouse name: Not on file    Number of children: Not on file    Years of education: Not on file    Highest education level: Not on file   Occupational History    Occupation: disability   Social Needs    Financial resource strain: Not on file    Food insecurity:     Worry: Not on file     Inability: Not on file    Transportation needs:     Medical: Not on file     Non-medical: Not on file   Tobacco Use    Smoking status: Current Every Day Smoker     Packs/day: 1 00     Types: Cigarettes    Smokeless tobacco: Current User   Substance and Sexual Activity    Alcohol use: Yes     Frequency: Monthly or less     Comment: vodka and beer, has decreased to occasionally    Drug use: Yes     Types: Marijuana     Comment: medical    Sexual activity: Yes     Partners: Male     Comment: PT is    Lifestyle    Physical activity:     Days per week: Not on file     Minutes per session: Not on file    Stress: Not on file   Relationships    Social connections:     Talks on phone: Not on file     Gets together: Not on file     Attends Yazidi service: Not on file Active member of club or organization: Not on file     Attends meetings of clubs or organizations: Not on file     Relationship status: Not on file    Intimate partner violence:     Fear of current or ex partner: Not on file     Emotionally abused: Not on file     Physically abused: Not on file     Forced sexual activity: Not on file   Other Topics Concern    Not on file   Social History Narrative    Lives with spouse       Past Medical History:    Past Medical History:   Diagnosis Date    Chronic pain disorder     Depression     GERD (gastroesophageal reflux disease)     History of electroconvulsive therapy     Low back pain     Self-injurious behavior     Suicide attempt [address] [address] )      No past medical history pertinent negatives  Past Surgical History:   Procedure Laterality Date     SECTION      COLONOSCOPY      PANCREAS SURGERY      "pseudocysts" per patient's  Ricki Infante    [place] ESOPHAGOGASTRODUODENOSCOPY TRANSORAL DIAGNOSTIC N/A 4/10/2018    Procedure: EGD AND COLONOSCOPY;  Surgeon: Keke Carlson MD;  [place] [facility]; Service: Gastroenterology     Allergies   Allergen Reactions    Chantix [Varenicline]     Ibuprofen Other (See Comments)    Penicillins Other (See Comments)     ? hives    Sulfa Antibiotics Other (See Comments)     sloughing skin in mouth    Sulfasalazine        History Review:     The following portions of the patient's history were reviewed and updated as appropriate: allergies, current medications, past family history, past medical history, past social history, past surgical history and problem list     OBJECTIVE:    Vital signs in last 24 hours:    Vitals:    20 1147   BP: 138/54   Pulse: (!) 111       Mental Status Evaluation:      Appearance Adequate hygiene and grooming   Behavior calm and cooperative   Mood depressed  Depression Scale - 8 of 10 (0 = No depression)  Anxiety Scale - 2 of 10 (0 = No anxiety)   Speech Normal rate and volume   Affect appropriate   Thought Processes Goal directed and coherent   Thought Content Does not verbalize delusional material   Associations Tightly connected   Perceptual Disturbances Denies hallucinations and does not appear to be responding to internal stimuli   Risk Potential Suicidal/Homicidal Ideation - No suicidal or homicidal ideation  Risk of Violence - No  Risk of Self Mutilation - No   Orientation oriented to person, place, time/date and situation   Memory recent and remote memory grossly intact   Consciousness alert and awake   Attention/Concentration attention span and concentration are age appropriate   Intellect appears to be of average intelligence   Insight intact   Judgement intact   Muscle Strength and Gait normal muscle strength and normal muscle tone, normal gait/station and normal balance   Motor Activity no abnormal movements   Language no difficulty naming common objects, no difficulty repeating a phrase, no difficulty writing a sentence   Fund of Knowledge adequate knowledge of current events  adequate fund of knowledge regarding past history  adequate fund of knowledge regarding vocabulary    Pain moderate   Pain Scale 6       Laboratory Results:   Recent Labs (last 2 months):   No visits with results within 2 Month(s) from this visit     Latest known visit with results is:   Admission on 05/29/2019, Discharged on 06/03/2019   Component Date Value    WBC 05/29/2019 18 95*    RBC 05/29/2019 4 72     Hemoglobin 05/29/2019 15 0     Hematocrit 05/29/2019 46 7*    MCV 05/29/2019 99*    MCH 05/29/2019 31 8     MCHC 05/29/2019 32 1     RDW 05/29/2019 14 6     MPV 05/29/2019 9 9     Platelets 54/86/1388 341     nRBC 05/29/2019 0     Neutrophils Relative 05/29/2019 86*    Immat GRANS % 05/29/2019 1     Lymphocytes Relative 05/29/2019 8*    Monocytes Relative 05/29/2019 5     Eosinophils Relative 05/29/2019 0     Basophils Relative 05/29/2019 0     Neutrophils Absolute 05/29/2019 16 28*    Immature Grans Absolute 05/29/2019 0 09     Lymphocytes Absolute 05/29/2019 1 60     Monocytes Absolute 05/29/2019 0 89     Eosinophils Absolute 05/29/2019 0 02     Basophils Absolute 05/29/2019 0 07     Sodium 05/29/2019 134*    Potassium 05/29/2019 4 2     Chloride 05/29/2019 97*    CO2 05/29/2019 27     ANION GAP 05/29/2019 10     BUN 05/29/2019 13     Creatinine 05/29/2019 1 16     Glucose 05/29/2019 150*    Calcium 05/29/2019 9 7     AST 05/29/2019 21     ALT 05/29/2019 21     Alkaline Phosphatase 05/29/2019 120*    Total Protein 05/29/2019 7 7     Albumin 05/29/2019 3 6     Total Bilirubin 05/29/2019 0 51     eGFR 05/29/2019 55     Lipase 05/29/2019 49*    LACTIC ACID 05/29/2019 1 6     Blood Culture 05/29/2019 No Growth After 5 Days   Blood Culture 05/29/2019 No Growth After 5 Days       EXT PREG TEST UR (Ref: N* 05/29/2019 Negative     Color, UA 05/29/2019 Yellow     Clarity, UA 05/29/2019 Clear     pH, UA 05/29/2019 7 5     Leukocytes, UA 05/29/2019 Negative     Nitrite, UA 05/29/2019 Negative     Protein, UA 05/29/2019 Negative     Glucose, UA 05/29/2019 Negative     Ketones, UA 05/29/2019 Negative     Urobilinogen, UA 05/29/2019 0 2     Bilirubin, UA 05/29/2019 Negative     Blood, UA 05/29/2019 Small*    Specific [place], UA 05/29/2019 1 010     RBC, UA 05/29/2019 0-1*    WBC, UA 05/29/2019 2-4*    Epithelial Cells 05/29/2019 Moderate*    Bacteria, UA 05/29/2019 Occasional     Fecal Occult Blood Diagn* 05/29/2019 Positive*    Sodium 05/30/2019 138     Potassium 05/30/2019 3 8     Chloride 05/30/2019 103     CO2 05/30/2019 27     ANION GAP 05/30/2019 8     BUN 05/30/2019 7     Creatinine 05/30/2019 0 80     Glucose 05/30/2019 104     Calcium 05/30/2019 8 9     eGFR 05/30/2019 86     Magnesium 05/30/2019 1 8     WBC 05/30/2019 15 46*    RBC 05/30/2019 4 12     Hemoglobin 05/30/2019 13 1     Hematocrit 05/30/2019 41 7     MCV 05/30/2019 101*    MCH 05/30/2019 31 8     MCHC 05/30/2019 31 4     RDW 05/30/2019 15 0     MPV 05/30/2019 10 0     Platelets 25/48/0736 256     nRBC 05/30/2019 0     Neutrophils Relative 05/30/2019 79*    Immat GRANS % 05/30/2019 0     Lymphocytes Relative 05/30/2019 16     Monocytes Relative 05/30/2019 5     Eosinophils Relative 05/30/2019 0     Basophils Relative 05/30/2019 0     Neutrophils Absolute 05/30/2019 12 05*    Immature Grans Absolute 05/30/2019 0 05     Lymphocytes Absolute 05/30/2019 2 49     Monocytes Absolute 05/30/2019 0 77     Eosinophils Absolute 05/30/2019 0 04     Basophils Absolute 05/30/2019 0 06     Case Report 05/30/2019                      Value:Surgical Pathology Report                         Case: [id number]                                   Authorizing Provider:  Teresa Zaman MD           Collected:           05/30/2019 1619              Ordering [place]     [facility]        Received:            05/30/2019 [facility] [address]                                                            Pathologist:           Nan Claude, MD                                                         Specimen:    Large Intestine, Left/Descending Colon, bx, colitis                                        Final Diagnosis 05/30/2019                      Value: This result contains rich text formatting which cannot be displayed here   Additional Information 05/30/2019                      Value: This result contains rich text formatting which cannot be displayed here  Meghna Splinter Gross Description 05/30/2019                      Value: This result contains rich text formatting which cannot be displayed here      WBC 05/31/2019 11 58*    RBC 05/31/2019 4 12     Hemoglobin 05/31/2019 13 0     Hematocrit 05/31/2019 42 4     MCV 05/31/2019 103*    MCH 05/31/2019 31 6     MCHC 05/31/2019 30 7*    RDW 05/31/2019 14 8     Platelets 61/79/4036 226     MPV 05/31/2019 10 3     Sodium 05/31/2019 141     Potassium 05/31/2019 3 7     Chloride 05/31/2019 107     CO2 05/31/2019 22     ANION GAP 05/31/2019 12     BUN 05/31/2019 6     Creatinine 05/31/2019 0 91     Glucose 05/31/2019 96     Calcium 05/31/2019 9 3     eGFR 05/31/2019 74     Sodium 06/01/2019 143     Potassium 06/01/2019 3 4*    Chloride 06/01/2019 108     CO2 06/01/2019 24     ANION GAP 06/01/2019 11     BUN 06/01/2019 3*    Creatinine 06/01/2019 0 77     Glucose 06/01/2019 92     Calcium 06/01/2019 8 9     eGFR 06/01/2019 90     WBC 06/01/2019 8 20     RBC 06/01/2019 3 82     Hemoglobin 06/01/2019 12 0     Hematocrit 06/01/2019 38 5     MCV 06/01/2019 101*    MCH 06/01/2019 31 4     MCHC 06/01/2019 31 2*    RDW 06/01/2019 14 7     MPV 06/01/2019 10 3     Platelets 86/59/4651 216     nRBC 06/01/2019 0     Neutrophils Relative 06/01/2019 70     Immat GRANS % 06/01/2019 0     Lymphocytes Relative 06/01/2019 22     Monocytes Relative 06/01/2019 5     Eosinophils Relative 06/01/2019 2     Basophils Relative 06/01/2019 1     Neutrophils Absolute 06/01/2019 5 67     Immature Grans Absolute 06/01/2019 0 02     Lymphocytes Absolute 06/01/2019 1 84     Monocytes Absolute 06/01/2019 0 44     Eosinophils Absolute 06/01/2019 0 17     Basophils Absolute 06/01/2019 0 06     Sodium 06/02/2019 142     Potassium 06/02/2019 3 6     Chloride 06/02/2019 106     CO2 06/02/2019 26     ANION GAP 06/02/2019 10     BUN 06/02/2019 4*    Creatinine 06/02/2019 0 79     Glucose 06/02/2019 105     Calcium 06/02/2019 9 3     eGFR 06/02/2019 88      I have personally reviewed all pertinent laboratory/tests results      Suicide/Homicide Risk Assessment:    Risk of Harm to Self:   The following ratings are based on assessment at the time of the interview   Demographic risk factors include: , age: over 48 or older   Historical Risk Factors include: chronic depression    Risk of Harm to Others:   The following ratings are based on assessment at the time of the interview   Recent Specific Risk Factors include: none  The following interventions are recommended: contracts for safety at present - agrees to go to ED if feeling unsafe, referral for psychotherapy    Assessment/Plan:      Diagnoses and all orders for this visit:    Severe episode of recurrent major depressive disorder, without psychotic features ([facility] [address] )  -     traZODone (DESYREL) 50 mg tablet; Take 1 tablet (50 mg total) by mouth daily at bedtime as needed for sleep    Generalized anxiety disorder  -     Ambulatory referral to Psychiatry    Adjustment insomnia  -     traZODone (DESYREL) 50 mg tablet; Take 1 tablet (50 mg total) by mouth daily at bedtime as needed for sleep          Treatment Recommendations/Precautions:    Discontinue Lunesta initiate trazodone 25-50 mg p o  HS at bedtime  Continue Effexor 150 mg p o  Daily  Continue mirtazapine 30 mg p o  HS  Aware of 24 hour and weekend coverage for urgent situations accessed by calling [facility] main practice number  Medication management with psychotherapy in 1 weeks  Continue psychotherapy with therapist    Medications Risks/Benefits:      Risks, Benefits And Possible Side Effects Of Medications:    Discussed risks and benefits of treatment with patient including risk of suicidality and serotonin syndrome related to treatment with antidepressants; Risk of induction of manic symptoms in certain patient populations and Risk of elevated blood pressure with SSRIs discussed with patient       Controlled Medication Discussion:     Not applicable    Treatment Plan:    Completed and signed during the session: Yes - with CARLITOS Paz 02/18/20

## 2020-02-19 ENCOUNTER — TELEPHONE (OUTPATIENT)
Dept: PSYCHIATRY | Facility: CLINIC | Age: 50
End: 2020-02-19

## 2020-02-26 ENCOUNTER — OFFICE VISIT (OUTPATIENT)
Dept: PSYCHIATRY | Facility: CLINIC | Age: 50
End: 2020-02-26
Payer: COMMERCIAL

## 2020-02-26 ENCOUNTER — TELEPHONE (OUTPATIENT)
Dept: PSYCHIATRY | Facility: CLINIC | Age: 50
End: 2020-02-26

## 2020-02-26 DIAGNOSIS — F33.2 SEVERE EPISODE OF RECURRENT MAJOR DEPRESSIVE DISORDER, WITHOUT PSYCHOTIC FEATURES (HCC): ICD-10-CM

## 2020-02-26 PROCEDURE — 3078F DIAST BP <80 MM HG: CPT | Performed by: NURSE PRACTITIONER

## 2020-02-26 PROCEDURE — 99214 OFFICE O/P EST MOD 30 MIN: CPT | Performed by: NURSE PRACTITIONER

## 2020-02-26 PROCEDURE — 4004F PT TOBACCO SCREEN RCVD TLK: CPT | Performed by: NURSE PRACTITIONER

## 2020-02-26 PROCEDURE — 3075F SYST BP GE 130 - 139MM HG: CPT | Performed by: NURSE PRACTITIONER

## 2020-02-26 RX ORDER — VENLAFAXINE HYDROCHLORIDE 150 MG/1
150 CAPSULE, EXTENDED RELEASE ORAL DAILY
Qty: 30 CAPSULE | Refills: 0 | Status: SHIPPED | OUTPATIENT
Start: 2020-02-26 | End: 2020-04-02

## 2020-02-26 RX ORDER — GABAPENTIN 400 MG/1
400 CAPSULE ORAL 3 TIMES DAILY
Qty: 90 CAPSULE | Refills: 1 | Status: SHIPPED | OUTPATIENT
Start: 2020-02-26 | End: 2020-02-26

## 2020-02-26 RX ORDER — MIRTAZAPINE 30 MG/1
30 TABLET, FILM COATED ORAL
Qty: 30 TABLET | Refills: 2 | Status: SHIPPED | OUTPATIENT
Start: 2020-02-26 | End: 2020-05-26 | Stop reason: SDUPTHER

## 2020-02-26 RX ORDER — GABAPENTIN 400 MG/1
400 CAPSULE ORAL 3 TIMES DAILY
Qty: 90 CAPSULE | Refills: 1 | Status: SHIPPED | OUTPATIENT
Start: 2020-02-26 | End: 2020-04-02 | Stop reason: SDUPTHER

## 2020-02-26 NOTE — PSYCH
MEDICATION MANAGEMENT NOTE        [facility]      Name and Date of Birth:  Gretchen Simmons 44 y o  1970 MRN: [id number]    Date of Visit: February 26, 2020    SUBJECTIVE:    Osorio Teresa is seen today for a follow up for depression, anxiety and insomnia  She continues to experience ongoing symptoms since the last visit  Patient reports that since last visit sleep has not improved  Patient continues to lie awake at night worrying about various things  These include thoughts what happens when my dog dies, he's 15 alreadyand worrying about father who has Parkinson's disease  Symptoms have not worsened but symptoms are since last visit  Depression remains 8/10 and patient variable level anxiety day  Sleep hygiene discussed with patient  Patient instructed to take all nighttime medications 1/2 hour before bed  Patient previously taking medications up to hour to 90 minutes prior to bedtime  Psychiatry will take over management of gabapentin 400 mg t i d   From primary care patient to be treated for chronic anxiety symptoms and pain  Supportive counseling provided for life stressors  She denies any side effects from medications  PLAN:  Increase trazodone 50mg PO to 1-2 tablets at bedtime as needed for sleep  Take nighttime dose of gabapentin at hs  Patient will now take gabapentin 400 mg, Remeron 30 mg, trazodone  mg together 30 minutes prior to sleep  Patient has appointment for psychotherapy 03/16/2020  Continued Medications  Mirtazapine 30 mg p o  HS   Trazodone 50 mg to 100 mg p o  P r n  Hs   Gabapentin 400 mg t i d  Last dose at HS   Effexor  mg p o   Daily     HPI ROS Appetite Changes and Sleep:     She reports difficulty sleeping, difficulty falling asleep, disrupted sleep, adequate appetite, decreased energy    Review Of Systems:      General emotional problems, sleep disturbances and decreased functioning   Personality no change in personality   Constitutional negative   ENT negative   Cardiovascular negative   Respiratory negative   Gastrointestinal negative   Genitourinary negative   Musculoskeletal negative   Integumentary negative   Neurological negative   Endocrine negative   Other Symptoms none, all other systems are negative     Mental Status Evaluation:    Appearance Adequate hygiene and grooming   Behavior cooperative   Mood anxious and depressed     Speech Normal rate and volume   Affect appropriate   Thought Processes Goal directed and coherent   Thought Content Does not verbalize delusional material   Associations Tightly connected   Perceptual Disturbances Denies hallucinations and does not appear to be responding to internal stimuli   Risk Potential Suicidal/Homicidal Ideation - No suicidal or homicidal ideation  Risk of Violence - No  Risk of Self Mutilation - No   Orientation oriented to person, place, time/date and situation   Memory recent and remote memory grossly intact   Consciousness alert and awake   Attention/Concentration attention span and concentration are age appropriate   Insight intact   Judgement intact   Muscle Strength and Gait normal muscle strength and normal muscle tone, normal gait/station and normal balance   Motor Activity no abnormal movements   Language no difficulty naming common objects, no difficulty repeating a phrase, no difficulty writing a sentence   Fund of Knowledge adequate knowledge of current events  adequate fund of knowledge regarding past history  adequate fund of knowledge regarding vocabulary        Past Psychiatric History Update:     Inpatient Psychiatric Admission Since Last Encounter:   no  Changes to Outpatient Psychiatric Treatment:    no  Suicide Attempt Or Self Mutilation Since Last Encounter:   no  Incidence of Violent Behavior Since Last Encounter:   no      Traumatic History Update:     New Onset of Abuse Since Last Encounter:   no  Traumatic Events Since Last Encounter:   no    Past Medical History:    Past Medical History:   Diagnosis Date    Chronic pain disorder     Depression     GERD (gastroesophageal reflux disease)     History of electroconvulsive therapy     Low back pain     Self-injurious behavior     Suicide attempt ([address] )      No past medical history pertinent negatives  Past Surgical History:   Procedure Laterality Date     SECTION      COLONOSCOPY      PANCREAS SURGERY      "pseudocysts" per patient's  Ricki Infante    [place] ESOPHAGOGASTRODUODENOSCOPY TRANSORAL DIAGNOSTIC N/A 4/10/2018    Procedure: EGD AND COLONOSCOPY;  Surgeon: Maximus Cintron MD;  [facility];   Service: Gastroenterology     Allergies   Allergen Reactions    Chantix [Varenicline]     Ibuprofen Other (See Comments)    Penicillins Other (See Comments)     ? hives    Sulfa Antibiotics Other (See Comments)     sloughing skin in mouth    Sulfasalazine        Substance Abuse History:    Social History     Substance and Sexual Activity   Alcohol Use Yes    Frequency: Monthly or less    Comment: vodka and beer, has decreased to occasionally     Social History     Substance and Sexual Activity   Drug Use Yes    Types: Marijuana    Comment: medical       Social History:    Social History     Socioeconomic History    Marital status: /Civil Union     Spouse name: Not on file    Number of children: Not on file    Years of education: Not on file    Highest education level: Not on file   Occupational History    Occupation: disability   Social Needs    Financial resource strain: Not on file    Food insecurity:     Worry: Not on file     Inability: Not on file    Transportation needs:     Medical: Not on file     Non-medical: Not on file   Tobacco Use    Smoking status: Current Every Day Smoker     Packs/day: 1 00     Types: Cigarettes    Smokeless tobacco: Current User   Substance and Sexual Activity    Alcohol use: Yes     Frequency: Monthly or less Comment: vodka and beer, has decreased to occasionally    Drug use: Yes     Types: Marijuana     Comment: medical    Sexual activity: Yes     Partners: Male     Comment: PT is    Lifestyle    Physical activity:     Days per week: Not on file     Minutes per session: Not on file    Stress: Not on file   Relationships    Social connections:     Talks on phone: Not on file     Gets together: Not on file     Attends [employer] service: Not on file     Active member of club or organization: Not on file     Attends meetings of clubs or organizations: Not on file     Relationship status: Not on file    Intimate partner violence:     Fear of current or ex partner: Not on file     Emotionally abused: Not on file     Physically abused: Not on file     Forced sexual activity: Not on file   Other Topics Concern    Not on file   Social History Narrative    Lives with spouse       Family Psychiatric History:     Family History   Problem Relation Age of Onset    Arthritis Mother     Coronary artery disease Mother     Colon cancer Family     Parkinsonism Father     Parkinsonism Paternal Grandmother     Coronary artery disease Paternal Grandfather     Depression Neg Hx        History Review: The following portions of the patient's history were reviewed and updated as appropriate: allergies, current medications, past family history, past medical history, past social history, past surgical history and problem list          OBJECTIVE:     Vital signs in last 24 hours: There were no vitals filed for this visit  Laboratory Results:   Recent Labs (last 2 months):   No visits with results within 2 Month(s) from this visit     Latest known visit with results is:   Admission on 05/29/2019, Discharged on 06/03/2019   Component Date Value    WBC 05/29/2019 18 95*    RBC 05/29/2019 4 72     Hemoglobin 05/29/2019 15 0     Hematocrit 05/29/2019 46 7*    MCV 05/29/2019 99*    MCH 05/29/2019 31 8     [facility] 05/29/2019 32 1     RDW 05/29/2019 14 6     MPV 05/29/2019 9 9     Platelets 05/06/0992 341     nRBC 05/29/2019 0     Neutrophils Relative 05/29/2019 86*    Immat GRANS % 05/29/2019 1     Lymphocytes Relative 05/29/2019 8*    Monocytes Relative 05/29/2019 5     Eosinophils Relative 05/29/2019 0     Basophils Relative 05/29/2019 0     Neutrophils Absolute 05/29/2019 16 28*    Immature Grans Absolute 05/29/2019 0 09     Lymphocytes Absolute 05/29/2019 1 60     Monocytes Absolute 05/29/2019 0 89     Eosinophils Absolute 05/29/2019 0 02     Basophils Absolute 05/29/2019 0 07     Sodium 05/29/2019 134*    Potassium 05/29/2019 4 2     Chloride 05/29/2019 97*    CO2 05/29/2019 27     ANION GAP 05/29/2019 10     BUN 05/29/2019 13     Creatinine 05/29/2019 1 16     Glucose 05/29/2019 150*    Calcium 05/29/2019 9 7     AST 05/29/2019 21     ALT 05/29/2019 21     Alkaline Phosphatase 05/29/2019 120*    Total Protein 05/29/2019 7 7     Albumin 05/29/2019 3 6     Total Bilirubin 05/29/2019 0 51     eGFR 05/29/2019 55     Lipase 05/29/2019 49*    LACTIC ACID 05/29/2019 1 6     Blood Culture 05/29/2019 No Growth After 5 Days   Blood Culture 05/29/2019 No Growth After 5 Days       EXT PREG TEST UR (Ref: N* 05/29/2019 Negative     Color, UA 05/29/2019 Yellow     Clarity, UA 05/29/2019 Clear     pH, UA 05/29/2019 7 5     Leukocytes, UA 05/29/2019 Negative     Nitrite, UA 05/29/2019 Negative     Protein, UA 05/29/2019 Negative     Glucose, UA 05/29/2019 Negative     Ketones, UA 05/29/2019 Negative     Urobilinogen, UA 05/29/2019 0 2     Bilirubin, UA 05/29/2019 Negative     Blood, UA 05/29/2019 Small*    Specific [place], UA 05/29/2019 1 010     RBC, UA 05/29/2019 0-1*    WBC, UA 05/29/2019 2-4*    Epithelial Cells 05/29/2019 Moderate*    Bacteria, UA 05/29/2019 Occasional     Fecal Occult Blood Diagn* 05/29/2019 Positive*    Sodium 05/30/2019 138     Potassium 05/30/2019 3 8  Chloride 05/30/2019 103     CO2 05/30/2019 27     ANION GAP 05/30/2019 8     BUN 05/30/2019 7     Creatinine 05/30/2019 0 80     Glucose 05/30/2019 104     Calcium 05/30/2019 8 9     eGFR 05/30/2019 86     Magnesium 05/30/2019 1 8     WBC 05/30/2019 15 46*    RBC 05/30/2019 4 12     Hemoglobin 05/30/2019 13 1     Hematocrit 05/30/2019 41 7     MCV 05/30/2019 101*    MCH 05/30/2019 31 8     MCHC 05/30/2019 31 4     RDW 05/30/2019 15 0     MPV 05/30/2019 10 0     Platelets 46/16/5815 256     nRBC 05/30/2019 0     Neutrophils Relative 05/30/2019 79*    Immat GRANS % 05/30/2019 0     Lymphocytes Relative 05/30/2019 16     Monocytes Relative 05/30/2019 5     Eosinophils Relative 05/30/2019 0     Basophils Relative 05/30/2019 0     Neutrophils Absolute 05/30/2019 12 05*    Immature Grans Absolute 05/30/2019 0 05     Lymphocytes Absolute 05/30/2019 2 49     Monocytes Absolute 05/30/2019 0 77     Eosinophils Absolute 05/30/2019 0 04     Basophils Absolute 05/30/2019 0 06     Case Report 05/30/2019                      Value:Surgical Pathology Report                         Case: [id number]                                   Authorizing Provider:  Ty Hammer MD           Collected:           05/30/2019 1619              Ordering [place]     [facility]        Received:            05/30/2019 [address]                                                            Pathologist:           David Sanchez MD                                                         Specimen:    Large Intestine, Left/Descending Colon, bx, colitis                                        Final Diagnosis 05/30/2019                      Value: This result contains rich text formatting which cannot be displayed here   Additional Information 05/30/2019                      Value: This result contains rich text formatting which cannot be displayed here     Yonny Monterroso Description 05/30/2019                      Value: This result contains rich text formatting which cannot be displayed here      WBC 05/31/2019 11 58*    RBC 05/31/2019 4 12     Hemoglobin 05/31/2019 13 0     Hematocrit 05/31/2019 42 4     MCV 05/31/2019 103*    MCH 05/31/2019 31 6     MCHC 05/31/2019 30 7*    RDW 05/31/2019 14 8     Platelets 73/15/4889 226     MPV 05/31/2019 10 3     Sodium 05/31/2019 141     Potassium 05/31/2019 3 7     Chloride 05/31/2019 107     CO2 05/31/2019 22     ANION GAP 05/31/2019 12     BUN 05/31/2019 6     Creatinine 05/31/2019 0 91     Glucose 05/31/2019 96     Calcium 05/31/2019 9 3     eGFR 05/31/2019 74     Sodium 06/01/2019 143     Potassium 06/01/2019 3 4*    Chloride 06/01/2019 108     CO2 06/01/2019 24     ANION GAP 06/01/2019 11     BUN 06/01/2019 3*    Creatinine 06/01/2019 0 77     Glucose 06/01/2019 92     Calcium 06/01/2019 8 9     eGFR 06/01/2019 90     WBC 06/01/2019 8 20     RBC 06/01/2019 3 82     Hemoglobin 06/01/2019 12 0     Hematocrit 06/01/2019 38 5     MCV 06/01/2019 101*    MCH 06/01/2019 31 4     MCHC 06/01/2019 31 2*    RDW 06/01/2019 14 7     MPV 06/01/2019 10 3     Platelets 72/27/6179 216     nRBC 06/01/2019 0     Neutrophils Relative 06/01/2019 70     Immat GRANS % 06/01/2019 0     Lymphocytes Relative 06/01/2019 22     Monocytes Relative 06/01/2019 5     Eosinophils Relative 06/01/2019 2     Basophils Relative 06/01/2019 1     Neutrophils Absolute 06/01/2019 5 67     Immature Grans Absolute 06/01/2019 0 02     Lymphocytes Absolute 06/01/2019 1 84     Monocytes Absolute 06/01/2019 0 44     Eosinophils Absolute 06/01/2019 0 17     Basophils Absolute 06/01/2019 0 06     Sodium 06/02/2019 142     Potassium 06/02/2019 3 6     Chloride 06/02/2019 106     CO2 06/02/2019 26     ANION GAP 06/02/2019 10     BUN 06/02/2019 4*    Creatinine 06/02/2019 0 79     Glucose 06/02/2019 105     Calcium 06/02/2019 9 3     eGFR 06/02/2019 88      I have personally reviewed all pertinent laboratory/tests results  Suicide/Homicide Risk Assessment:    Risk of Harm to Self:   The following ratings are based on assessment at the time of the interview   Recent Specific Risk Factors include: diagnosis of depression    Risk of Harm to Others:   The following ratings are based on assessment at the time of the interview   Recent Specific Risk Factors include: none  The following interventions are recommended: contracts for safety at present - agrees to go to ED if feeling unsafe    Medications Risks/Benefits:      Risks, Benefits And Possible Side Effects Of Medications:    Discussed risks and benefits of treatment with patient including risk of suicidality and serotonin syndrome related to treatment with antidepressants; Risk of induction of manic symptoms in certain patient populations     Controlled Medication Discussion:     Not applicable    Assessment/Plan:      Diagnoses and all orders for this visit:    Severe episode of recurrent major depressive disorder, without psychotic features (HCC)  -     gabapentin (NEURONTIN) 400 mg capsule; Take 1 capsule (400 mg total) by mouth 3 (three) times a day  -     venlafaxine (EFFEXOR-XR) 150 mg 24 hr capsule; Take 1 capsule (150 mg total) by mouth daily  -     mirtazapine (REMERON) 30 mg tablet; Take 1 tablet (30 mg total) by mouth daily at bedtime          Treatment Recommendations/Precautions:    Aware of 24 hour and weekend coverage for urgent situations accessed by calling [facility] main practice number  Medication management with psychotherapy in 4 weeks  Referral for individual psychotherapy    Psychotherapy Provided:     Individual psychotherapy provided: Yes  Counseling was provided during the session today for 16 minutes  Supportive therapy provided  Discussed Current life stressors    Carotid supportive  regarding strategies for understanding father's chronic illness  Strategies for self-care during times of high stress       Treatment Plan:    Due for update/Updated:   no    CARLITOS Hayden 02/26/20

## 2020-03-03 ENCOUNTER — TELEPHONE (OUTPATIENT)
Dept: PSYCHIATRY | Facility: CLINIC | Age: 50
End: 2020-03-03

## 2020-03-03 NOTE — TELEPHONE ENCOUNTER
Patient is already currently on multiple medications for sleep and will need to be seen in office for further medication changes  Patient may reschedule for sooner appointment if sleep continues to be a problem  Ensure she is following recommendation to take trazodone, Remeron and gabapentin together 30 minutes prior to sleep time  Encourage her to review handout on sleep hygiene

## 2020-03-03 NOTE — TELEPHONE ENCOUNTER
Marybel Jimenez called and left a message, She stated that she needs help sleeping and that she has no been sleeping  I called Marybel Jimenez back to get more information but she did no answer  I left a message for her to call us back

## 2020-03-05 DIAGNOSIS — K21.00 ESOPHAGITIS, REFLUX: ICD-10-CM

## 2020-03-05 RX ORDER — ONDANSETRON 4 MG/1
4 TABLET, FILM COATED ORAL DAILY PRN
Qty: 90 TABLET | Refills: 0 | Status: SHIPPED | OUTPATIENT
Start: 2020-03-05 | End: 2020-06-11

## 2020-03-09 ENCOUNTER — TELEPHONE (OUTPATIENT)
Dept: PSYCHIATRY | Facility: CLINIC | Age: 50
End: 2020-03-09

## 2020-03-10 ENCOUNTER — TELEPHONE (OUTPATIENT)
Dept: PSYCHIATRY | Facility: CLINIC | Age: 50
End: 2020-03-10

## 2020-03-10 DIAGNOSIS — F41.1 GENERALIZED ANXIETY DISORDER: Primary | ICD-10-CM

## 2020-03-10 RX ORDER — TRAZODONE HYDROCHLORIDE 50 MG/1
TABLET ORAL
Qty: 60 TABLET | Refills: 0 | Status: SHIPPED | OUTPATIENT
Start: 2020-03-10 | End: 2020-04-02

## 2020-03-10 NOTE — TELEPHONE ENCOUNTER
Dr Karina Liao are you able to fill Trazodone for Muriel Lillian in [place] absence? Per Office Note of 2/26/20, Muriel Lillian was instructed to take 1-2 tablets and now she is out of medication  Please advise    Thank you

## 2020-03-10 NOTE — TELEPHONE ENCOUNTER
Debby Marroquin called again regarding her Trazadone  She is almost out of her medication since she has been doubling up on the pills, per [facility] directions  Please route to a doctor so she can get her medication  Thank you!

## 2020-03-16 ENCOUNTER — DOCUMENTATION (OUTPATIENT)
Dept: BEHAVIORAL/MENTAL HEALTH CLINIC | Facility: CLINIC | Age: 50
End: 2020-03-16

## 2020-03-16 NOTE — PROGRESS NOTES
Treatment Plan not completed within required time limits due to: Anton Pickett  cancelled appointment  on 3/16/2020

## 2020-03-27 ENCOUNTER — TELEPHONE (OUTPATIENT)
Dept: ADMINISTRATIVE | Facility: OTHER | Age: 50
End: 2020-03-27

## 2020-03-27 DIAGNOSIS — G89.29 OTHER CHRONIC PAIN: ICD-10-CM

## 2020-03-27 DIAGNOSIS — J44.9 COPD WITHOUT EXACERBATION (HCC): ICD-10-CM

## 2020-03-27 RX ORDER — BUDESONIDE AND FORMOTEROL FUMARATE DIHYDRATE 160; 4.5 UG/1; UG/1
2 AEROSOL RESPIRATORY (INHALATION) 2 TIMES DAILY
Qty: 10.2 INHALER | Refills: 3 | Status: SHIPPED | OUTPATIENT
Start: 2020-03-27 | End: 2020-06-16 | Stop reason: SDUPTHER

## 2020-03-27 RX ORDER — BUTALBITAL, ACETAMINOPHEN AND CAFFEINE 50; 325; 40 MG/1; MG/1; MG/1
1 TABLET ORAL DAILY PRN
Qty: 90 TABLET | Refills: 0 | Status: SHIPPED | OUTPATIENT
Start: 2020-03-27 | End: 2022-05-25 | Stop reason: ALTCHOICE

## 2020-03-27 NOTE — TELEPHONE ENCOUNTER
----- Message from Frances Padilla, [address] sent at 3/27/2020  3:00 PM EDT -----  Regarding: PAP exam/ [facility]  03/27/20 3:00 PM    Hello, our patient Marino Pascual has had Pap Smear (HPV) aka Cervical Cancer Screening completed/performed  Please assist in updating the patient chart by pulling the Care Everywhere (CE) document  The date of service is 10/17/17       Thank you,  Frances Padilla MA  [facility]

## 2020-03-27 NOTE — TELEPHONE ENCOUNTER
Upon review of the In Basket request we were able to locate, review, and update the patient chart as requested for Pap Smear (HPV) aka Cervical Cancer Screening  Any additional questions or concerns should be emailed to the Practice Liaisons via [email or web address] email, please do not reply via In Basket      Thank you  Leta Fair

## 2020-04-02 ENCOUNTER — TELEMEDICINE (OUTPATIENT)
Dept: PSYCHIATRY | Facility: CLINIC | Age: 50
End: 2020-04-02
Payer: COMMERCIAL

## 2020-04-02 DIAGNOSIS — F33.2 SEVERE EPISODE OF RECURRENT MAJOR DEPRESSIVE DISORDER, WITHOUT PSYCHOTIC FEATURES (HCC): Primary | ICD-10-CM

## 2020-04-02 DIAGNOSIS — F41.1 GENERALIZED ANXIETY DISORDER: ICD-10-CM

## 2020-04-02 DIAGNOSIS — F51.01 PRIMARY INSOMNIA: ICD-10-CM

## 2020-04-02 PROCEDURE — 99214 OFFICE O/P EST MOD 30 MIN: CPT | Performed by: NURSE PRACTITIONER

## 2020-04-02 PROCEDURE — NC001 PR NO CHARGE: Performed by: NURSE PRACTITIONER

## 2020-04-02 PROCEDURE — 90833 PSYTX W PT W E/M 30 MIN: CPT | Performed by: NURSE PRACTITIONER

## 2020-04-02 RX ORDER — VENLAFAXINE HYDROCHLORIDE 75 MG/1
75 CAPSULE, EXTENDED RELEASE ORAL DAILY
Qty: 30 CAPSULE | Refills: 1 | Status: SHIPPED | OUTPATIENT
Start: 2020-04-02 | End: 2020-05-26 | Stop reason: SDUPTHER

## 2020-04-02 RX ORDER — ESZOPICLONE 3 MG/1
3 TABLET, FILM COATED ORAL
Qty: 30 TABLET | Refills: 0 | Status: SHIPPED | OUTPATIENT
Start: 2020-04-02 | End: 2020-04-27 | Stop reason: SDUPTHER

## 2020-04-02 RX ORDER — VENLAFAXINE HYDROCHLORIDE 150 MG/1
150 CAPSULE, EXTENDED RELEASE ORAL DAILY
Qty: 30 CAPSULE | Refills: 1 | Status: SHIPPED | OUTPATIENT
Start: 2020-04-02 | End: 2020-05-26 | Stop reason: SDUPTHER

## 2020-04-02 RX ORDER — GABAPENTIN 400 MG/1
400 CAPSULE ORAL 3 TIMES DAILY
Qty: 90 CAPSULE | Refills: 1 | Status: SHIPPED | OUTPATIENT
Start: 2020-04-02 | End: 2020-05-12

## 2020-04-15 ENCOUNTER — TELEMEDICINE (OUTPATIENT)
Dept: BEHAVIORAL/MENTAL HEALTH CLINIC | Facility: CLINIC | Age: 50
End: 2020-04-15
Payer: COMMERCIAL

## 2020-04-15 DIAGNOSIS — F41.1 GENERALIZED ANXIETY DISORDER: ICD-10-CM

## 2020-04-15 DIAGNOSIS — F33.2 SEVERE EPISODE OF RECURRENT MAJOR DEPRESSIVE DISORDER, WITHOUT PSYCHOTIC FEATURES (HCC): Primary | ICD-10-CM

## 2020-04-15 PROCEDURE — 90834 PSYTX W PT 45 MINUTES: CPT | Performed by: SOCIAL WORKER

## 2020-04-16 ENCOUNTER — TELEMEDICINE (OUTPATIENT)
Dept: PSYCHIATRY | Facility: CLINIC | Age: 50
End: 2020-04-16
Payer: COMMERCIAL

## 2020-04-16 DIAGNOSIS — F33.2 SEVERE EPISODE OF RECURRENT MAJOR DEPRESSIVE DISORDER, WITHOUT PSYCHOTIC FEATURES (HCC): ICD-10-CM

## 2020-04-16 DIAGNOSIS — I10 ESSENTIAL HYPERTENSION: ICD-10-CM

## 2020-04-16 PROCEDURE — 99213 OFFICE O/P EST LOW 20 MIN: CPT | Performed by: NURSE PRACTITIONER

## 2020-04-16 PROCEDURE — NC001 PR NO CHARGE: Performed by: NURSE PRACTITIONER

## 2020-04-16 PROCEDURE — 90833 PSYTX W PT W E/M 30 MIN: CPT | Performed by: NURSE PRACTITIONER

## 2020-04-17 RX ORDER — LISINOPRIL 20 MG/1
20 TABLET ORAL DAILY
Qty: 90 TABLET | Refills: 0 | Status: SHIPPED | OUTPATIENT
Start: 2020-04-17 | End: 2020-07-17 | Stop reason: SDUPTHER

## 2020-04-23 ENCOUNTER — DOCUMENTATION (OUTPATIENT)
Dept: PSYCHIATRY | Facility: CLINIC | Age: 50
End: 2020-04-23

## 2020-04-26 ENCOUNTER — NURSE TRIAGE (OUTPATIENT)
Dept: OTHER | Facility: OTHER | Age: 50
End: 2020-04-26

## 2020-04-27 ENCOUNTER — TELEMEDICINE (OUTPATIENT)
Dept: FAMILY MEDICINE CLINIC | Facility: CLINIC | Age: 50
End: 2020-04-27
Payer: COMMERCIAL

## 2020-04-27 VITALS — BODY MASS INDEX: 25.23 KG/M2 | WEIGHT: 157 LBS | HEIGHT: 66 IN

## 2020-04-27 DIAGNOSIS — Z87.81 HISTORY OF RIB FRACTURE: Primary | ICD-10-CM

## 2020-04-27 DIAGNOSIS — K21.00 ESOPHAGITIS, REFLUX: ICD-10-CM

## 2020-04-27 DIAGNOSIS — F51.01 PRIMARY INSOMNIA: ICD-10-CM

## 2020-04-27 DIAGNOSIS — R10.84 ABDOMINAL CRAMPING, GENERALIZED: ICD-10-CM

## 2020-04-27 DIAGNOSIS — G58.8 INTERCOSTAL NEURALGIA: ICD-10-CM

## 2020-04-27 PROCEDURE — 99214 OFFICE O/P EST MOD 30 MIN: CPT | Performed by: INTERNAL MEDICINE

## 2020-04-27 RX ORDER — TRAMADOL HYDROCHLORIDE 50 MG/1
50 TABLET ORAL DAILY PRN
Qty: 5 TABLET | Refills: 0 | Status: SHIPPED | OUTPATIENT
Start: 2020-04-27 | End: 2020-05-18 | Stop reason: SDUPTHER

## 2020-04-27 RX ORDER — FAMOTIDINE 20 MG/1
20 TABLET, FILM COATED ORAL DAILY
Qty: 30 TABLET | Refills: 3 | Status: SHIPPED | OUTPATIENT
Start: 2020-04-27 | End: 2020-08-17

## 2020-04-27 RX ORDER — ESZOPICLONE 3 MG/1
3 TABLET, FILM COATED ORAL
Qty: 30 TABLET | Refills: 0 | Status: CANCELLED | OUTPATIENT
Start: 2020-04-27

## 2020-04-27 RX ORDER — BACLOFEN 10 MG/1
10 TABLET ORAL 3 TIMES DAILY
COMMUNITY
End: 2020-08-24 | Stop reason: ALTCHOICE

## 2020-04-27 RX ORDER — ESZOPICLONE 3 MG/1
3 TABLET, FILM COATED ORAL
Qty: 10 TABLET | Refills: 0 | Status: SHIPPED | OUTPATIENT
Start: 2020-04-27 | End: 2020-05-04 | Stop reason: SDUPTHER

## 2020-05-04 ENCOUNTER — TELEMEDICINE (OUTPATIENT)
Dept: PSYCHIATRY | Facility: CLINIC | Age: 50
End: 2020-05-04
Payer: COMMERCIAL

## 2020-05-04 DIAGNOSIS — F51.01 PRIMARY INSOMNIA: ICD-10-CM

## 2020-05-04 DIAGNOSIS — F41.1 GENERALIZED ANXIETY DISORDER: ICD-10-CM

## 2020-05-04 DIAGNOSIS — F33.2 SEVERE EPISODE OF RECURRENT MAJOR DEPRESSIVE DISORDER, WITHOUT PSYCHOTIC FEATURES (HCC): Primary | ICD-10-CM

## 2020-05-04 PROCEDURE — 90833 PSYTX W PT W E/M 30 MIN: CPT | Performed by: NURSE PRACTITIONER

## 2020-05-04 PROCEDURE — NC001 PR NO CHARGE: Performed by: NURSE PRACTITIONER

## 2020-05-04 PROCEDURE — 99214 OFFICE O/P EST MOD 30 MIN: CPT | Performed by: NURSE PRACTITIONER

## 2020-05-04 RX ORDER — ESZOPICLONE 3 MG/1
3 TABLET, FILM COATED ORAL
Qty: 30 TABLET | Refills: 1 | Status: SHIPPED | OUTPATIENT
Start: 2020-05-04 | End: 2020-05-26 | Stop reason: SDUPTHER

## 2020-05-04 RX ORDER — CLONAZEPAM 1 MG/1
1 TABLET ORAL DAILY PRN
Qty: 30 TABLET | Refills: 0 | Status: SHIPPED | OUTPATIENT
Start: 2020-05-04 | End: 2020-06-02 | Stop reason: SDUPTHER

## 2020-05-07 DIAGNOSIS — K21.00 ESOPHAGITIS, REFLUX: ICD-10-CM

## 2020-05-07 RX ORDER — OMEPRAZOLE 40 MG/1
40 CAPSULE, DELAYED RELEASE ORAL DAILY
Qty: 90 CAPSULE | Refills: 0 | Status: SHIPPED | OUTPATIENT
Start: 2020-05-07 | End: 2020-07-15 | Stop reason: SDUPTHER

## 2020-05-12 DIAGNOSIS — F33.2 SEVERE EPISODE OF RECURRENT MAJOR DEPRESSIVE DISORDER, WITHOUT PSYCHOTIC FEATURES (HCC): ICD-10-CM

## 2020-05-12 RX ORDER — GABAPENTIN 400 MG/1
400 CAPSULE ORAL 3 TIMES DAILY
Qty: 90 CAPSULE | Refills: 1 | Status: SHIPPED | OUTPATIENT
Start: 2020-05-12 | End: 2020-05-27 | Stop reason: SDUPTHER

## 2020-05-13 ENCOUNTER — HOSPITAL ENCOUNTER (OUTPATIENT)
Facility: HOSPITAL | Age: 50
Setting detail: OBSERVATION
Discharge: HOME | End: 2020-05-14
Attending: EMERGENCY MEDICINE | Admitting: INTERNAL MEDICINE
Payer: COMMERCIAL

## 2020-05-13 ENCOUNTER — APPOINTMENT (EMERGENCY)
Dept: CT IMAGING | Facility: HOSPITAL | Age: 50
End: 2020-05-13
Payer: COMMERCIAL

## 2020-05-13 DIAGNOSIS — R10.9 ABDOMINAL PAIN: ICD-10-CM

## 2020-05-13 DIAGNOSIS — K52.9 COLITIS: Primary | ICD-10-CM

## 2020-05-13 DIAGNOSIS — K58.0 IRRITABLE BOWEL SYNDROME WITH DIARRHEA: ICD-10-CM

## 2020-05-13 DIAGNOSIS — N94.9 ADNEXAL CYST: ICD-10-CM

## 2020-05-13 DIAGNOSIS — Z72.0 TOBACCO ABUSE: ICD-10-CM

## 2020-05-13 LAB
ABO GROUP BLD: NORMAL
ABO GROUP BLD: NORMAL
ALBUMIN SERPL BCP-MCNC: 4 G/DL (ref 3.5–5)
ALP SERPL-CCNC: 102 U/L (ref 46–116)
ALT SERPL W P-5'-P-CCNC: 16 U/L (ref 12–78)
ANION GAP SERPL CALCULATED.3IONS-SCNC: 9 MMOL/L (ref 4–13)
AST SERPL W P-5'-P-CCNC: 17 U/L (ref 5–45)
BACTERIA UR QL AUTO: NORMAL /HPF
BASOPHILS # BLD AUTO: 0.07 THOUSANDS/ΜL (ref 0–0.1)
BASOPHILS NFR BLD AUTO: 1 % (ref 0–1)
BILIRUB SERPL-MCNC: 0.5 MG/DL (ref 0.2–1)
BILIRUB UR QL STRIP: NEGATIVE
BLD GP AB SCN SERPL QL: NEGATIVE
BUN SERPL-MCNC: 4 MG/DL (ref 5–25)
CALCIUM SERPL-MCNC: 9.3 MG/DL (ref 8.3–10.1)
CHLORIDE SERPL-SCNC: 101 MMOL/L (ref 100–108)
CLARITY UR: CLEAR
CO2 SERPL-SCNC: 27 MMOL/L (ref 21–32)
COLOR UR: YELLOW
CREAT SERPL-MCNC: 1.04 MG/DL (ref 0.6–1.3)
EOSINOPHIL # BLD AUTO: 0.12 THOUSAND/ΜL (ref 0–0.61)
EOSINOPHIL NFR BLD AUTO: 1 % (ref 0–6)
ERYTHROCYTE [DISTWIDTH] IN BLOOD BY AUTOMATED COUNT: 13.8 % (ref 11.6–15.1)
EXT PREG TEST URINE: NEGATIVE
EXT. CONTROL ED NAV: NORMAL
GFR SERPL CREATININE-BSD FRML MDRD: 62 ML/MIN/1.73SQ M
GLUCOSE SERPL-MCNC: 89 MG/DL (ref 65–140)
GLUCOSE UR STRIP-MCNC: NEGATIVE MG/DL
HCT VFR BLD AUTO: 45.5 % (ref 34.8–46.1)
HGB BLD-MCNC: 14.3 G/DL (ref 11.5–15.4)
HGB UR QL STRIP.AUTO: ABNORMAL
IMM GRANULOCYTES # BLD AUTO: 0.03 THOUSAND/UL (ref 0–0.2)
IMM GRANULOCYTES NFR BLD AUTO: 0 % (ref 0–2)
INR PPP: 0.88 (ref 0.84–1.19)
KETONES UR STRIP-MCNC: NEGATIVE MG/DL
LACTATE SERPL-SCNC: 1 MMOL/L (ref 0.5–2)
LEUKOCYTE ESTERASE UR QL STRIP: NEGATIVE
LIPASE SERPL-CCNC: 81 U/L (ref 73–393)
LYMPHOCYTES # BLD AUTO: 2.78 THOUSANDS/ΜL (ref 0.6–4.47)
LYMPHOCYTES NFR BLD AUTO: 27 % (ref 14–44)
MCH RBC QN AUTO: 32.9 PG (ref 26.8–34.3)
MCHC RBC AUTO-ENTMCNC: 31.4 G/DL (ref 31.4–37.4)
MCV RBC AUTO: 105 FL (ref 82–98)
MONOCYTES # BLD AUTO: 0.51 THOUSAND/ΜL (ref 0.17–1.22)
MONOCYTES NFR BLD AUTO: 5 % (ref 4–12)
NEUTROPHILS # BLD AUTO: 6.77 THOUSANDS/ΜL (ref 1.85–7.62)
NEUTS SEG NFR BLD AUTO: 66 % (ref 43–75)
NITRITE UR QL STRIP: NEGATIVE
NON-SQ EPI CELLS URNS QL MICRO: NORMAL /HPF
NRBC BLD AUTO-RTO: 0 /100 WBCS
PH UR STRIP.AUTO: 6 [PH] (ref 4.5–8)
PLATELET # BLD AUTO: 332 THOUSANDS/UL (ref 149–390)
PMV BLD AUTO: 9.7 FL (ref 8.9–12.7)
POTASSIUM SERPL-SCNC: 3.9 MMOL/L (ref 3.5–5.3)
PROT SERPL-MCNC: 7.5 G/DL (ref 6.4–8.2)
PROT UR STRIP-MCNC: NEGATIVE MG/DL
PROTHROMBIN TIME: 12 SECONDS (ref 11.6–14.5)
RBC # BLD AUTO: 4.34 MILLION/UL (ref 3.81–5.12)
RBC #/AREA URNS AUTO: NORMAL /HPF
RH BLD: POSITIVE
RH BLD: POSITIVE
SARS-COV-2 RNA RESP QL NAA+PROBE: NEGATIVE
SODIUM SERPL-SCNC: 137 MMOL/L (ref 136–145)
SP GR UR STRIP.AUTO: 1.01 (ref 1–1.03)
SPECIMEN EXPIRATION DATE: NORMAL
UROBILINOGEN UR QL STRIP.AUTO: 0.2 E.U./DL
WBC # BLD AUTO: 10.28 THOUSAND/UL (ref 4.31–10.16)
WBC #/AREA URNS AUTO: NORMAL /HPF

## 2020-05-13 PROCEDURE — 99285 EMERGENCY DEPT VISIT HI MDM: CPT | Performed by: EMERGENCY MEDICINE

## 2020-05-13 PROCEDURE — 86850 RBC ANTIBODY SCREEN: CPT | Performed by: EMERGENCY MEDICINE

## 2020-05-13 PROCEDURE — 87635 SARS-COV-2 COVID-19 AMP PRB: CPT | Performed by: PHYSICIAN ASSISTANT

## 2020-05-13 PROCEDURE — 96374 THER/PROPH/DIAG INJ IV PUSH: CPT

## 2020-05-13 PROCEDURE — 86900 BLOOD TYPING SEROLOGIC ABO: CPT | Performed by: EMERGENCY MEDICINE

## 2020-05-13 PROCEDURE — 99285 EMERGENCY DEPT VISIT HI MDM: CPT

## 2020-05-13 PROCEDURE — 99255 IP/OBS CONSLTJ NEW/EST HI 80: CPT | Performed by: INTERNAL MEDICINE

## 2020-05-13 PROCEDURE — 96361 HYDRATE IV INFUSION ADD-ON: CPT

## 2020-05-13 PROCEDURE — 87177 OVA AND PARASITES SMEARS: CPT | Performed by: PHYSICIAN ASSISTANT

## 2020-05-13 PROCEDURE — 83690 ASSAY OF LIPASE: CPT | Performed by: EMERGENCY MEDICINE

## 2020-05-13 PROCEDURE — 36415 COLL VENOUS BLD VENIPUNCTURE: CPT | Performed by: EMERGENCY MEDICINE

## 2020-05-13 PROCEDURE — 74177 CT ABD & PELVIS W/CONTRAST: CPT

## 2020-05-13 PROCEDURE — 85025 COMPLETE CBC W/AUTO DIFF WBC: CPT | Performed by: EMERGENCY MEDICINE

## 2020-05-13 PROCEDURE — 80053 COMPREHEN METABOLIC PANEL: CPT | Performed by: EMERGENCY MEDICINE

## 2020-05-13 PROCEDURE — 81001 URINALYSIS AUTO W/SCOPE: CPT

## 2020-05-13 PROCEDURE — 87505 NFCT AGENT DETECTION GI: CPT | Performed by: PHYSICIAN ASSISTANT

## 2020-05-13 PROCEDURE — 85610 PROTHROMBIN TIME: CPT | Performed by: EMERGENCY MEDICINE

## 2020-05-13 PROCEDURE — 81025 URINE PREGNANCY TEST: CPT | Performed by: EMERGENCY MEDICINE

## 2020-05-13 PROCEDURE — 96375 TX/PRO/DX INJ NEW DRUG ADDON: CPT

## 2020-05-13 PROCEDURE — 86901 BLOOD TYPING SEROLOGIC RH(D): CPT | Performed by: EMERGENCY MEDICINE

## 2020-05-13 PROCEDURE — 96376 TX/PRO/DX INJ SAME DRUG ADON: CPT

## 2020-05-13 PROCEDURE — 87209 SMEAR COMPLEX STAIN: CPT | Performed by: PHYSICIAN ASSISTANT

## 2020-05-13 PROCEDURE — 83605 ASSAY OF LACTIC ACID: CPT | Performed by: EMERGENCY MEDICINE

## 2020-05-13 PROCEDURE — 99220 PR INITIAL OBSERVATION CARE/DAY 70 MINUTES: CPT | Performed by: PHYSICIAN ASSISTANT

## 2020-05-13 RX ORDER — LEVOFLOXACIN 5 MG/ML
750 INJECTION, SOLUTION INTRAVENOUS ONCE
Status: DISCONTINUED | OUTPATIENT
Start: 2020-05-13 | End: 2020-05-13

## 2020-05-13 RX ORDER — OXYCODONE HYDROCHLORIDE AND ACETAMINOPHEN 5; 325 MG/1; MG/1
1 TABLET ORAL EVERY 4 HOURS PRN
Status: DISCONTINUED | OUTPATIENT
Start: 2020-05-13 | End: 2020-05-14 | Stop reason: HOSPADM

## 2020-05-13 RX ORDER — MIRTAZAPINE 15 MG/1
30 TABLET, FILM COATED ORAL
Status: DISCONTINUED | OUTPATIENT
Start: 2020-05-13 | End: 2020-05-14 | Stop reason: HOSPADM

## 2020-05-13 RX ORDER — MORPHINE SULFATE 4 MG/ML
6 INJECTION, SOLUTION INTRAMUSCULAR; INTRAVENOUS ONCE
Status: COMPLETED | OUTPATIENT
Start: 2020-05-13 | End: 2020-05-13

## 2020-05-13 RX ORDER — PANTOPRAZOLE SODIUM 40 MG/1
40 TABLET, DELAYED RELEASE ORAL
Status: DISCONTINUED | OUTPATIENT
Start: 2020-05-14 | End: 2020-05-14 | Stop reason: HOSPADM

## 2020-05-13 RX ORDER — DICYCLOMINE HCL 20 MG
20 TABLET ORAL
Status: DISCONTINUED | OUTPATIENT
Start: 2020-05-13 | End: 2020-05-14 | Stop reason: HOSPADM

## 2020-05-13 RX ORDER — BACLOFEN 10 MG/1
10 TABLET ORAL 3 TIMES DAILY PRN
Status: DISCONTINUED | OUTPATIENT
Start: 2020-05-13 | End: 2020-05-14 | Stop reason: HOSPADM

## 2020-05-13 RX ORDER — FAMOTIDINE 20 MG/1
20 TABLET, FILM COATED ORAL DAILY
Status: DISCONTINUED | OUTPATIENT
Start: 2020-05-14 | End: 2020-05-14 | Stop reason: HOSPADM

## 2020-05-13 RX ORDER — POLYETHYLENE GLYCOL 3350 17 G/17G
238 POWDER, FOR SOLUTION ORAL ONCE
Status: COMPLETED | OUTPATIENT
Start: 2020-05-13 | End: 2020-05-13

## 2020-05-13 RX ORDER — VENLAFAXINE HYDROCHLORIDE 150 MG/1
150 CAPSULE, EXTENDED RELEASE ORAL DAILY
Status: DISCONTINUED | OUTPATIENT
Start: 2020-05-14 | End: 2020-05-14 | Stop reason: HOSPADM

## 2020-05-13 RX ORDER — VENLAFAXINE HYDROCHLORIDE 75 MG/1
75 CAPSULE, EXTENDED RELEASE ORAL DAILY
Status: DISCONTINUED | OUTPATIENT
Start: 2020-05-14 | End: 2020-05-14 | Stop reason: HOSPADM

## 2020-05-13 RX ORDER — BACLOFEN 10 MG/1
10 TABLET ORAL 3 TIMES DAILY
Status: DISCONTINUED | OUTPATIENT
Start: 2020-05-13 | End: 2020-05-13

## 2020-05-13 RX ORDER — CLONAZEPAM 1 MG/1
1 TABLET ORAL DAILY PRN
Status: DISCONTINUED | OUTPATIENT
Start: 2020-05-13 | End: 2020-05-14 | Stop reason: HOSPADM

## 2020-05-13 RX ORDER — BUDESONIDE AND FORMOTEROL FUMARATE DIHYDRATE 160; 4.5 UG/1; UG/1
2 AEROSOL RESPIRATORY (INHALATION) 2 TIMES DAILY
Status: DISCONTINUED | OUTPATIENT
Start: 2020-05-13 | End: 2020-05-14 | Stop reason: HOSPADM

## 2020-05-13 RX ORDER — ZOLPIDEM TARTRATE 5 MG/1
10 TABLET ORAL
Status: DISCONTINUED | OUTPATIENT
Start: 2020-05-13 | End: 2020-05-14 | Stop reason: HOSPADM

## 2020-05-13 RX ORDER — LISINOPRIL 20 MG/1
20 TABLET ORAL DAILY
Status: DISCONTINUED | OUTPATIENT
Start: 2020-05-14 | End: 2020-05-14 | Stop reason: HOSPADM

## 2020-05-13 RX ORDER — ONDANSETRON 2 MG/ML
4 INJECTION INTRAMUSCULAR; INTRAVENOUS ONCE
Status: COMPLETED | OUTPATIENT
Start: 2020-05-13 | End: 2020-05-13

## 2020-05-13 RX ORDER — GABAPENTIN 400 MG/1
400 CAPSULE ORAL 3 TIMES DAILY
Status: DISCONTINUED | OUTPATIENT
Start: 2020-05-13 | End: 2020-05-14 | Stop reason: HOSPADM

## 2020-05-13 RX ORDER — CLONAZEPAM 1 MG/1
1 TABLET ORAL DAILY PRN
Status: DISCONTINUED | OUTPATIENT
Start: 2020-05-13 | End: 2020-05-13

## 2020-05-13 RX ADMIN — OXYCODONE HYDROCHLORIDE AND ACETAMINOPHEN 1 TABLET: 5; 325 TABLET ORAL at 17:30

## 2020-05-13 RX ADMIN — BUDESONIDE AND FORMOTEROL FUMARATE DIHYDRATE 2 PUFF: 160; 4.5 AEROSOL RESPIRATORY (INHALATION) at 18:34

## 2020-05-13 RX ADMIN — METRONIDAZOLE 500 MG: 500 INJECTION, SOLUTION INTRAVENOUS at 18:37

## 2020-05-13 RX ADMIN — ZOLPIDEM TARTRATE 10 MG: 5 TABLET, COATED ORAL at 22:31

## 2020-05-13 RX ADMIN — LEVOFLOXACIN 750 MG: 5 INJECTION, SOLUTION INTRAVENOUS at 16:39

## 2020-05-13 RX ADMIN — MORPHINE SULFATE 6 MG: 4 INJECTION INTRAVENOUS at 10:53

## 2020-05-13 RX ADMIN — GABAPENTIN 400 MG: 400 CAPSULE ORAL at 17:30

## 2020-05-13 RX ADMIN — ONDANSETRON 4 MG: 2 INJECTION INTRAMUSCULAR; INTRAVENOUS at 10:52

## 2020-05-13 RX ADMIN — DICYCLOMINE HYDROCHLORIDE 20 MG: 20 TABLET ORAL at 22:31

## 2020-05-13 RX ADMIN — SODIUM CHLORIDE 1000 ML: 0.9 INJECTION, SOLUTION INTRAVENOUS at 10:49

## 2020-05-13 RX ADMIN — MIRTAZAPINE 30 MG: 15 TABLET, FILM COATED ORAL at 21:39

## 2020-05-13 RX ADMIN — GABAPENTIN 400 MG: 400 CAPSULE ORAL at 22:31

## 2020-05-13 RX ADMIN — MORPHINE SULFATE 6 MG: 4 INJECTION INTRAVENOUS at 12:14

## 2020-05-13 RX ADMIN — MORPHINE SULFATE 2 MG: 2 INJECTION, SOLUTION INTRAMUSCULAR; INTRAVENOUS at 21:39

## 2020-05-13 RX ADMIN — POLYETHYLENE GLYCOL 3350 238 G: 17 POWDER, FOR SOLUTION ORAL at 17:31

## 2020-05-13 RX ADMIN — BISACODYL 10 MG: 5 TABLET, COATED ORAL at 17:30

## 2020-05-13 RX ADMIN — DICYCLOMINE HYDROCHLORIDE 20 MG: 20 TABLET ORAL at 17:30

## 2020-05-13 RX ADMIN — IOHEXOL 100 ML: 350 INJECTION, SOLUTION INTRAVENOUS at 11:42

## 2020-05-14 ENCOUNTER — ANESTHESIA EVENT (OUTPATIENT)
Dept: GASTROENTEROLOGY | Facility: HOSPITAL | Age: 50
End: 2020-05-14
Payer: COMMERCIAL

## 2020-05-14 ENCOUNTER — APPOINTMENT (OUTPATIENT)
Dept: GASTROENTEROLOGY | Facility: HOSPITAL | Age: 50
End: 2020-05-14
Payer: COMMERCIAL

## 2020-05-14 ENCOUNTER — TELEPHONE (OUTPATIENT)
Dept: BEHAVIORAL/MENTAL HEALTH CLINIC | Facility: CLINIC | Age: 50
End: 2020-05-14

## 2020-05-14 ENCOUNTER — ANESTHESIA (OUTPATIENT)
Dept: GASTROENTEROLOGY | Facility: HOSPITAL | Age: 50
End: 2020-05-14
Payer: COMMERCIAL

## 2020-05-14 VITALS
TEMPERATURE: 97.8 F | HEART RATE: 87 BPM | OXYGEN SATURATION: 99 % | SYSTOLIC BLOOD PRESSURE: 105 MMHG | WEIGHT: 173.5 LBS | DIASTOLIC BLOOD PRESSURE: 68 MMHG | BODY MASS INDEX: 27.88 KG/M2 | RESPIRATION RATE: 20 BRPM | HEIGHT: 66 IN

## 2020-05-14 LAB
ALBUMIN SERPL BCP-MCNC: 3.1 G/DL (ref 3.5–5)
ALP SERPL-CCNC: 85 U/L (ref 46–116)
ALT SERPL W P-5'-P-CCNC: 13 U/L (ref 12–78)
ANION GAP SERPL CALCULATED.3IONS-SCNC: 5 MMOL/L (ref 4–13)
AST SERPL W P-5'-P-CCNC: 14 U/L (ref 5–45)
BILIRUB SERPL-MCNC: 0.39 MG/DL (ref 0.2–1)
BUN SERPL-MCNC: 6 MG/DL (ref 5–25)
C DIFF TOX GENS STL QL NAA+PROBE: NEGATIVE
CALCIUM SERPL-MCNC: 8.4 MG/DL (ref 8.3–10.1)
CAMPYLOBACTER DNA SPEC NAA+PROBE: NORMAL
CHLORIDE SERPL-SCNC: 103 MMOL/L (ref 100–108)
CO2 SERPL-SCNC: 29 MMOL/L (ref 21–32)
CREAT SERPL-MCNC: 0.89 MG/DL (ref 0.6–1.3)
ERYTHROCYTE [DISTWIDTH] IN BLOOD BY AUTOMATED COUNT: 13.8 % (ref 11.6–15.1)
GFR SERPL CREATININE-BSD FRML MDRD: 75 ML/MIN/1.73SQ M
GLUCOSE SERPL-MCNC: 81 MG/DL (ref 65–140)
HCT VFR BLD AUTO: 39.7 % (ref 34.8–46.1)
HGB BLD-MCNC: 12.1 G/DL (ref 11.5–15.4)
MCH RBC QN AUTO: 33 PG (ref 26.8–34.3)
MCHC RBC AUTO-ENTMCNC: 30.5 G/DL (ref 31.4–37.4)
MCV RBC AUTO: 108 FL (ref 82–98)
PLATELET # BLD AUTO: 271 THOUSANDS/UL (ref 149–390)
PMV BLD AUTO: 10.1 FL (ref 8.9–12.7)
POTASSIUM SERPL-SCNC: 4 MMOL/L (ref 3.5–5.3)
PROT SERPL-MCNC: 6.2 G/DL (ref 6.4–8.2)
RBC # BLD AUTO: 3.67 MILLION/UL (ref 3.81–5.12)
SALMONELLA DNA SPEC QL NAA+PROBE: NORMAL
SHIGA TOXIN STX GENE SPEC NAA+PROBE: NORMAL
SHIGELLA DNA SPEC QL NAA+PROBE: NORMAL
SODIUM SERPL-SCNC: 137 MMOL/L (ref 136–145)
WBC # BLD AUTO: 8.32 THOUSAND/UL (ref 4.31–10.16)

## 2020-05-14 PROCEDURE — 88305 TISSUE EXAM BY PATHOLOGIST: CPT | Performed by: PATHOLOGY

## 2020-05-14 PROCEDURE — 80053 COMPREHEN METABOLIC PANEL: CPT | Performed by: PHYSICIAN ASSISTANT

## 2020-05-14 PROCEDURE — 85027 COMPLETE CBC AUTOMATED: CPT | Performed by: PHYSICIAN ASSISTANT

## 2020-05-14 PROCEDURE — 45380 COLONOSCOPY AND BIOPSY: CPT | Performed by: INTERNAL MEDICINE

## 2020-05-14 PROCEDURE — 99407 BEHAV CHNG SMOKING > 10 MIN: CPT | Performed by: INTERNAL MEDICINE

## 2020-05-14 PROCEDURE — 99217 PR OBSERVATION CARE DISCHARGE MANAGEMENT: CPT | Performed by: INTERNAL MEDICINE

## 2020-05-14 RX ORDER — ACETAMINOPHEN 325 MG/1
650 TABLET ORAL EVERY 6 HOURS PRN
Status: DISCONTINUED | OUTPATIENT
Start: 2020-05-14 | End: 2020-05-14 | Stop reason: HOSPADM

## 2020-05-14 RX ORDER — ONDANSETRON 2 MG/ML
4 INJECTION INTRAMUSCULAR; INTRAVENOUS EVERY 6 HOURS PRN
Status: DISCONTINUED | OUTPATIENT
Start: 2020-05-14 | End: 2020-05-14 | Stop reason: HOSPADM

## 2020-05-14 RX ORDER — NICOTINE 21 MG/24HR
1 PATCH, TRANSDERMAL 24 HOURS TRANSDERMAL DAILY
Status: DISCONTINUED | OUTPATIENT
Start: 2020-05-14 | End: 2020-05-14 | Stop reason: HOSPADM

## 2020-05-14 RX ORDER — PROPOFOL 10 MG/ML
INJECTION, EMULSION INTRAVENOUS AS NEEDED
Status: DISCONTINUED | OUTPATIENT
Start: 2020-05-14 | End: 2020-05-14 | Stop reason: SURG

## 2020-05-14 RX ORDER — SODIUM CHLORIDE 9 MG/ML
75 INJECTION, SOLUTION INTRAVENOUS CONTINUOUS
Status: DISCONTINUED | OUTPATIENT
Start: 2020-05-14 | End: 2020-05-14 | Stop reason: HOSPADM

## 2020-05-14 RX ORDER — DICYCLOMINE HCL 20 MG
20 TABLET ORAL
Qty: 90 TABLET | Refills: 0 | Status: SHIPPED | OUTPATIENT
Start: 2020-05-14 | End: 2020-08-24 | Stop reason: ALTCHOICE

## 2020-05-14 RX ORDER — NICOTINE 21 MG/24HR
1 PATCH, TRANSDERMAL 24 HOURS TRANSDERMAL DAILY
Qty: 28 PATCH | Refills: 0 | Status: SHIPPED | OUTPATIENT
Start: 2020-05-15 | End: 2020-07-02 | Stop reason: SDUPTHER

## 2020-05-14 RX ADMIN — SODIUM CHLORIDE 75 ML/HR: 0.9 INJECTION, SOLUTION INTRAVENOUS at 01:13

## 2020-05-14 RX ADMIN — PROPOFOL 50 MG: 10 INJECTION, EMULSION INTRAVENOUS at 15:51

## 2020-05-14 RX ADMIN — SODIUM CHLORIDE 75 ML/HR: 0.9 INJECTION, SOLUTION INTRAVENOUS at 15:08

## 2020-05-14 RX ADMIN — PROPOFOL 150 MG: 10 INJECTION, EMULSION INTRAVENOUS at 15:43

## 2020-05-14 RX ADMIN — MORPHINE SULFATE 2 MG: 2 INJECTION, SOLUTION INTRAMUSCULAR; INTRAVENOUS at 08:26

## 2020-05-14 RX ADMIN — BUDESONIDE AND FORMOTEROL FUMARATE DIHYDRATE 2 PUFF: 160; 4.5 AEROSOL RESPIRATORY (INHALATION) at 08:26

## 2020-05-14 RX ADMIN — OXYCODONE HYDROCHLORIDE AND ACETAMINOPHEN 1 TABLET: 5; 325 TABLET ORAL at 09:46

## 2020-05-14 RX ADMIN — NICOTINE 1 PATCH: 21 PATCH TRANSDERMAL at 08:26

## 2020-05-14 RX ADMIN — GABAPENTIN 400 MG: 400 CAPSULE ORAL at 08:27

## 2020-05-14 RX ADMIN — VENLAFAXINE HYDROCHLORIDE 150 MG: 75 CAPSULE, EXTENDED RELEASE ORAL at 08:27

## 2020-05-14 RX ADMIN — MORPHINE SULFATE 2 MG: 2 INJECTION, SOLUTION INTRAMUSCULAR; INTRAVENOUS at 13:27

## 2020-05-14 RX ADMIN — DICYCLOMINE HYDROCHLORIDE 20 MG: 20 TABLET ORAL at 17:02

## 2020-05-14 RX ADMIN — VENLAFAXINE HYDROCHLORIDE 75 MG: 75 CAPSULE, EXTENDED RELEASE ORAL at 08:27

## 2020-05-14 RX ADMIN — BUDESONIDE AND FORMOTEROL FUMARATE DIHYDRATE 2 PUFF: 160; 4.5 AEROSOL RESPIRATORY (INHALATION) at 17:47

## 2020-05-14 RX ADMIN — MORPHINE SULFATE 2 MG: 2 INJECTION, SOLUTION INTRAMUSCULAR; INTRAVENOUS at 03:16

## 2020-05-14 RX ADMIN — OXYCODONE HYDROCHLORIDE AND ACETAMINOPHEN 1 TABLET: 5; 325 TABLET ORAL at 17:02

## 2020-05-14 RX ADMIN — FAMOTIDINE 20 MG: 20 TABLET, FILM COATED ORAL at 08:26

## 2020-05-14 RX ADMIN — GABAPENTIN 400 MG: 400 CAPSULE ORAL at 17:02

## 2020-05-15 ENCOUNTER — TRANSITIONAL CARE MANAGEMENT (OUTPATIENT)
Dept: FAMILY MEDICINE CLINIC | Facility: CLINIC | Age: 50
End: 2020-05-15

## 2020-05-16 LAB — O+P STL CONC: NORMAL

## 2020-05-18 ENCOUNTER — TELEMEDICINE (OUTPATIENT)
Dept: FAMILY MEDICINE CLINIC | Facility: CLINIC | Age: 50
End: 2020-05-18
Payer: COMMERCIAL

## 2020-05-18 DIAGNOSIS — Z87.81 HISTORY OF RIB FRACTURE: ICD-10-CM

## 2020-05-18 DIAGNOSIS — Z72.0 TOBACCO ABUSE: ICD-10-CM

## 2020-05-18 DIAGNOSIS — K52.9 COLITIS: Primary | ICD-10-CM

## 2020-05-18 DIAGNOSIS — N94.9 ADNEXAL CYST: ICD-10-CM

## 2020-05-18 DIAGNOSIS — K64.8 INTERNAL HEMORRHOIDS: ICD-10-CM

## 2020-05-18 DIAGNOSIS — G58.8 INTERCOSTAL NEURALGIA: ICD-10-CM

## 2020-05-18 DIAGNOSIS — I10 ESSENTIAL HYPERTENSION: ICD-10-CM

## 2020-05-18 PROCEDURE — 1111F DSCHRG MED/CURRENT MED MERGE: CPT | Performed by: INTERNAL MEDICINE

## 2020-05-18 PROCEDURE — 99496 TRANSJ CARE MGMT HIGH F2F 7D: CPT | Performed by: INTERNAL MEDICINE

## 2020-05-18 RX ORDER — TRAMADOL HYDROCHLORIDE 50 MG/1
50 TABLET ORAL DAILY PRN
Qty: 5 TABLET | Refills: 0 | Status: SHIPPED | OUTPATIENT
Start: 2020-05-18 | End: 2020-08-14

## 2020-05-19 ENCOUNTER — TELEPHONE (OUTPATIENT)
Dept: GASTROENTEROLOGY | Facility: MEDICAL CENTER | Age: 50
End: 2020-05-19

## 2020-05-21 ENCOUNTER — TELEMEDICINE (OUTPATIENT)
Dept: BEHAVIORAL/MENTAL HEALTH CLINIC | Facility: CLINIC | Age: 50
End: 2020-05-21
Payer: COMMERCIAL

## 2020-05-21 DIAGNOSIS — F41.1 GENERALIZED ANXIETY DISORDER: ICD-10-CM

## 2020-05-21 DIAGNOSIS — F33.2 SEVERE EPISODE OF RECURRENT MAJOR DEPRESSIVE DISORDER, WITHOUT PSYCHOTIC FEATURES (HCC): Primary | ICD-10-CM

## 2020-05-21 PROCEDURE — 90834 PSYTX W PT 45 MINUTES: CPT | Performed by: SOCIAL WORKER

## 2020-05-26 DIAGNOSIS — F41.1 GENERALIZED ANXIETY DISORDER: ICD-10-CM

## 2020-05-26 DIAGNOSIS — F51.01 PRIMARY INSOMNIA: ICD-10-CM

## 2020-05-26 DIAGNOSIS — F33.2 SEVERE EPISODE OF RECURRENT MAJOR DEPRESSIVE DISORDER, WITHOUT PSYCHOTIC FEATURES (HCC): ICD-10-CM

## 2020-05-26 RX ORDER — ESZOPICLONE 3 MG/1
3 TABLET, FILM COATED ORAL
Qty: 30 TABLET | Refills: 1 | Status: SHIPPED | OUTPATIENT
Start: 2020-05-26 | End: 2020-07-01 | Stop reason: SDUPTHER

## 2020-05-26 RX ORDER — VENLAFAXINE HYDROCHLORIDE 75 MG/1
75 CAPSULE, EXTENDED RELEASE ORAL DAILY
Qty: 30 CAPSULE | Refills: 1 | Status: SHIPPED | OUTPATIENT
Start: 2020-05-26 | End: 2020-06-11

## 2020-05-26 RX ORDER — VENLAFAXINE HYDROCHLORIDE 150 MG/1
150 CAPSULE, EXTENDED RELEASE ORAL DAILY
Qty: 30 CAPSULE | Refills: 1 | Status: SHIPPED | OUTPATIENT
Start: 2020-05-26 | End: 2020-06-11

## 2020-05-26 RX ORDER — MIRTAZAPINE 30 MG/1
30 TABLET, FILM COATED ORAL
Qty: 30 TABLET | Refills: 2 | Status: SHIPPED | OUTPATIENT
Start: 2020-05-26 | End: 2020-06-11

## 2020-05-27 DIAGNOSIS — F33.2 SEVERE EPISODE OF RECURRENT MAJOR DEPRESSIVE DISORDER, WITHOUT PSYCHOTIC FEATURES (HCC): ICD-10-CM

## 2020-05-27 RX ORDER — GABAPENTIN 400 MG/1
400 CAPSULE ORAL 3 TIMES DAILY
Qty: 90 CAPSULE | Refills: 1 | Status: SHIPPED | OUTPATIENT
Start: 2020-05-27 | End: 2020-08-14

## 2020-05-28 ENCOUNTER — DOCUMENTATION (OUTPATIENT)
Dept: BEHAVIORAL/MENTAL HEALTH CLINIC | Facility: CLINIC | Age: 50
End: 2020-05-28

## 2020-06-02 DIAGNOSIS — F41.1 GENERALIZED ANXIETY DISORDER: ICD-10-CM

## 2020-06-02 RX ORDER — CLONAZEPAM 1 MG/1
0.5 TABLET ORAL DAILY PRN
Qty: 15 TABLET | Refills: 0 | Status: SHIPPED | OUTPATIENT
Start: 2020-06-02 | End: 2020-06-11

## 2020-06-08 ENCOUNTER — TELEPHONE (OUTPATIENT)
Dept: BEHAVIORAL/MENTAL HEALTH CLINIC | Facility: CLINIC | Age: 50
End: 2020-06-08

## 2020-06-11 ENCOUNTER — TELEMEDICINE (OUTPATIENT)
Dept: PSYCHIATRY | Facility: CLINIC | Age: 50
End: 2020-06-11
Payer: COMMERCIAL

## 2020-06-11 DIAGNOSIS — K21.00 ESOPHAGITIS, REFLUX: ICD-10-CM

## 2020-06-11 DIAGNOSIS — F41.1 GENERALIZED ANXIETY DISORDER: ICD-10-CM

## 2020-06-11 DIAGNOSIS — F33.2 SEVERE EPISODE OF RECURRENT MAJOR DEPRESSIVE DISORDER, WITHOUT PSYCHOTIC FEATURES (HCC): ICD-10-CM

## 2020-06-11 PROCEDURE — NC001 PR NO CHARGE: Performed by: NURSE PRACTITIONER

## 2020-06-11 PROCEDURE — 99214 OFFICE O/P EST MOD 30 MIN: CPT | Performed by: NURSE PRACTITIONER

## 2020-06-11 PROCEDURE — 90833 PSYTX W PT W E/M 30 MIN: CPT | Performed by: NURSE PRACTITIONER

## 2020-06-11 RX ORDER — VENLAFAXINE HYDROCHLORIDE 150 MG/1
150 CAPSULE, EXTENDED RELEASE ORAL DAILY
Qty: 30 CAPSULE | Refills: 2 | Status: SHIPPED | OUTPATIENT
Start: 2020-06-11 | End: 2020-08-14

## 2020-06-11 RX ORDER — VENLAFAXINE HYDROCHLORIDE 75 MG/1
75 CAPSULE, EXTENDED RELEASE ORAL DAILY
Qty: 30 CAPSULE | Refills: 2 | Status: SHIPPED | OUTPATIENT
Start: 2020-06-11 | End: 2020-08-14

## 2020-06-11 RX ORDER — ONDANSETRON 4 MG/1
4 TABLET, FILM COATED ORAL DAILY PRN
Qty: 90 TABLET | Refills: 0 | Status: SHIPPED | OUTPATIENT
Start: 2020-06-11 | End: 2020-09-28 | Stop reason: SDUPTHER

## 2020-06-11 RX ORDER — CLONAZEPAM 0.5 MG/1
0.5 TABLET ORAL DAILY PRN
Qty: 10 TABLET | Refills: 0 | Status: SHIPPED | OUTPATIENT
Start: 2020-06-11 | End: 2020-06-22 | Stop reason: SDUPTHER

## 2020-06-11 RX ORDER — MIRTAZAPINE 15 MG/1
15 TABLET, FILM COATED ORAL
Qty: 30 TABLET | Refills: 1 | Status: SHIPPED | OUTPATIENT
Start: 2020-06-11 | End: 2020-08-14

## 2020-06-16 DIAGNOSIS — J44.9 COPD WITHOUT EXACERBATION (HCC): ICD-10-CM

## 2020-06-16 RX ORDER — BUDESONIDE AND FORMOTEROL FUMARATE DIHYDRATE 160; 4.5 UG/1; UG/1
2 AEROSOL RESPIRATORY (INHALATION) 2 TIMES DAILY
Qty: 10.2 INHALER | Refills: 3 | Status: SHIPPED | OUTPATIENT
Start: 2020-06-16 | End: 2022-05-25 | Stop reason: ALTCHOICE

## 2020-06-22 DIAGNOSIS — F41.1 GENERALIZED ANXIETY DISORDER: ICD-10-CM

## 2020-06-22 RX ORDER — CLONAZEPAM 0.5 MG/1
0.5 TABLET ORAL DAILY
Qty: 30 TABLET | Refills: 0 | Status: SHIPPED | OUTPATIENT
Start: 2020-06-22 | End: 2020-07-23 | Stop reason: SDUPTHER

## 2020-07-01 ENCOUNTER — TELEPHONE (OUTPATIENT)
Dept: PSYCHIATRY | Facility: CLINIC | Age: 50
End: 2020-07-01

## 2020-07-01 DIAGNOSIS — F51.01 PRIMARY INSOMNIA: ICD-10-CM

## 2020-07-01 RX ORDER — ESZOPICLONE 3 MG/1
3 TABLET, FILM COATED ORAL
Qty: 30 TABLET | Refills: 1 | Status: SHIPPED | OUTPATIENT
Start: 2020-07-01 | End: 2020-08-14

## 2020-07-02 DIAGNOSIS — Z72.0 TOBACCO ABUSE: ICD-10-CM

## 2020-07-02 RX ORDER — NICOTINE 21 MG/24HR
PATCH, TRANSDERMAL 24 HOURS TRANSDERMAL
Qty: 28 PATCH | Refills: 0 | Status: SHIPPED | OUTPATIENT
Start: 2020-07-02 | End: 2022-05-25 | Stop reason: ALTCHOICE

## 2020-07-15 DIAGNOSIS — K21.00 ESOPHAGITIS, REFLUX: ICD-10-CM

## 2020-07-15 RX ORDER — OMEPRAZOLE 40 MG/1
40 CAPSULE, DELAYED RELEASE ORAL DAILY
Qty: 90 CAPSULE | Refills: 0 | Status: SHIPPED | OUTPATIENT
Start: 2020-07-15 | End: 2020-11-05 | Stop reason: SDUPTHER

## 2020-07-17 DIAGNOSIS — I10 ESSENTIAL HYPERTENSION: ICD-10-CM

## 2020-07-17 RX ORDER — LISINOPRIL 20 MG/1
20 TABLET ORAL DAILY
Qty: 90 TABLET | Refills: 0 | Status: SHIPPED | OUTPATIENT
Start: 2020-07-17 | End: 2020-10-20 | Stop reason: SDUPTHER

## 2020-07-23 ENCOUNTER — TELEPHONE (OUTPATIENT)
Dept: PSYCHIATRY | Facility: CLINIC | Age: 50
End: 2020-07-23

## 2020-07-23 DIAGNOSIS — F41.1 GENERALIZED ANXIETY DISORDER: Primary | ICD-10-CM

## 2020-07-23 RX ORDER — NALOXONE HYDROCHLORIDE 4 MG/.1ML
SPRAY NASAL
Qty: 1 EACH | Refills: 1 | Status: SHIPPED | OUTPATIENT
Start: 2020-07-23 | End: 2020-08-24 | Stop reason: ALTCHOICE

## 2020-07-23 RX ORDER — CLONAZEPAM 0.5 MG/1
0.25 TABLET ORAL DAILY
Qty: 3 TABLET | Refills: 0 | Status: SHIPPED | OUTPATIENT
Start: 2020-07-23 | End: 2020-08-14

## 2020-07-24 ENCOUNTER — TELEPHONE (OUTPATIENT)
Dept: OTHER | Facility: OTHER | Age: 50
End: 2020-07-24

## 2020-07-24 NOTE — TELEPHONE ENCOUNTER
Paged via TC:"Healthcall /Patient Abel Friendly 1970 / Phone # [phone] / Willam Covarrubias patient / There was a prescription for Narcan sent to the pharmacy today for her and she does not understand why   She is upset because now her  thinks she is on drugs and overdosing"

## 2020-07-27 NOTE — TELEPHONE ENCOUNTER
Attempted to call Serina Boyce back to address her call regarding Narcan  Did not leave message on answering machine  Will refer to Junior Howard for his information

## 2020-08-14 ENCOUNTER — TELEMEDICINE (OUTPATIENT)
Dept: PSYCHIATRY | Facility: CLINIC | Age: 50
End: 2020-08-14
Payer: COMMERCIAL

## 2020-08-14 DIAGNOSIS — F51.01 PRIMARY INSOMNIA: ICD-10-CM

## 2020-08-14 DIAGNOSIS — F41.1 GENERALIZED ANXIETY DISORDER: ICD-10-CM

## 2020-08-14 DIAGNOSIS — G58.8 INTERCOSTAL NEURALGIA: ICD-10-CM

## 2020-08-14 DIAGNOSIS — Z87.81 HISTORY OF RIB FRACTURE: ICD-10-CM

## 2020-08-14 DIAGNOSIS — F33.2 SEVERE EPISODE OF RECURRENT MAJOR DEPRESSIVE DISORDER, WITHOUT PSYCHOTIC FEATURES (HCC): ICD-10-CM

## 2020-08-14 PROCEDURE — 99214 OFFICE O/P EST MOD 30 MIN: CPT | Performed by: NURSE PRACTITIONER

## 2020-08-14 RX ORDER — VENLAFAXINE HYDROCHLORIDE 75 MG/1
75 CAPSULE, EXTENDED RELEASE ORAL DAILY
Qty: 90 CAPSULE | Refills: 1 | Status: SHIPPED | OUTPATIENT
Start: 2020-08-14 | End: 2021-03-09

## 2020-08-14 RX ORDER — GABAPENTIN 400 MG/1
800 CAPSULE ORAL 3 TIMES DAILY
Qty: 90 CAPSULE | Refills: 1
Start: 2020-08-14 | End: 2020-11-09

## 2020-08-14 RX ORDER — ESZOPICLONE 3 MG/1
3 TABLET, FILM COATED ORAL
Qty: 90 TABLET | Refills: 0 | Status: SHIPPED | OUTPATIENT
Start: 2020-08-14 | End: 2020-11-09 | Stop reason: SDUPTHER

## 2020-08-14 RX ORDER — CLONAZEPAM 0.5 MG/1
0.25 TABLET ORAL 2 TIMES DAILY
Qty: 90 TABLET | Refills: 0 | Status: SHIPPED | OUTPATIENT
Start: 2020-08-14 | End: 2020-08-14

## 2020-08-14 RX ORDER — TRAMADOL HYDROCHLORIDE 50 MG/1
100 TABLET ORAL DAILY PRN
Qty: 1 TABLET | Refills: 0
Start: 2020-08-14 | End: 2021-03-09

## 2020-08-14 RX ORDER — VENLAFAXINE HYDROCHLORIDE 150 MG/1
150 CAPSULE, EXTENDED RELEASE ORAL DAILY
Qty: 90 CAPSULE | Refills: 1 | Status: SHIPPED | OUTPATIENT
Start: 2020-08-14 | End: 2021-03-09

## 2020-08-14 RX ORDER — BUSPIRONE HYDROCHLORIDE 15 MG/1
15 TABLET ORAL 2 TIMES DAILY
Qty: 180 TABLET | Refills: 1 | Status: SHIPPED | OUTPATIENT
Start: 2020-08-14 | End: 2020-11-09

## 2020-08-14 RX ORDER — MIRTAZAPINE 15 MG/1
15 TABLET, FILM COATED ORAL
Qty: 90 TABLET | Refills: 1 | Status: SHIPPED | OUTPATIENT
Start: 2020-08-14 | End: 2021-03-09

## 2020-08-14 NOTE — PSYCH
Virtual Regular Visit    Problem List Items Addressed This Visit        Other    Severe episode of recurrent major depressive disorder, without psychotic features (HCC)    Relevant Medications    gabapentin (NEURONTIN) 400 mg capsule    eszopiclone (LUNESTA) 3 MG tablet    mirtazapine (REMERON) 15 mg tablet    venlafaxine (EFFEXOR-XR) 150 mg 24 hr capsule    venlafaxine (EFFEXOR-XR) 75 mg 24 hr capsule    busPIRone (BUSPAR) 15 mg tablet    Generalized anxiety disorder    Relevant Medications    eszopiclone (LUNESTA) 3 MG tablet    mirtazapine (REMERON) 15 mg tablet    venlafaxine (EFFEXOR-XR) 150 mg 24 hr capsule    venlafaxine (EFFEXOR-XR) 75 mg 24 hr capsule    busPIRone (BUSPAR) 15 mg tablet    Primary insomnia    Relevant Medications    eszopiclone (LUNESTA) 3 MG tablet    History of rib fracture    Relevant Medications    traMADol (ULTRAM) 50 mg tablet    Intercostal neuralgia    Relevant Medications    traMADol (ULTRAM) 50 mg tablet             Encounter provider CARLITOS Ribeiro    Provider located at   [address] [place] [address] [address]  [facility] [address]  [facility] [address] [phone]    Recent Visits  No visits were found meeting these conditions  Showing recent visits within past 7 days and meeting all other requirements     Today's Visits  Date Type Provider Dept   08/14/20 Telemedicine CARLITOS [facility]   Showing today's visits and meeting all other requirements     Future Appointments  No visits were found meeting these conditions  Showing future appointments within next 150 days and meeting all other requirements      The patient was identified by name and date of birth  Mehreen Dehenry was informed that this is a telemedicine visit and that the visit is being conducted through [employer]  My office door was closed  No one else was in the room    She acknowledged consent and understanding of privacy and security of the video platform  The patient has agreed to participate and understands they can discontinue the visit at any time  Patient is aware this is a billable service  HPI     Current Outpatient Medications   Medication Sig Dispense Refill    baclofen 10 mg tablet Take 10 mg by mouth 3 (three) times a day      budesonide-formoterol (Symbicort) 160-4 5 mcg/act inhaler Inhale 2 puffs 2 (two) times a day Rinse mouth after use  10 2 Inhaler 3    busPIRone (BUSPAR) 15 mg tablet Take 1 tablet (15 mg total) by mouth 2 (two) times a day 180 tablet 1    butalbital-acetaminophen-caffeine (FIORICET,ESGIC) -40 mg per tablet Take 1 tablet by mouth daily as needed for headaches 90 tablet 0    dicyclomine (BENTYL) 20 mg tablet Take 1 tablet (20 mg total) by mouth 4 (four) times a day (before meals and at bedtime) 90 tablet 0    eszopiclone (LUNESTA) 3 MG tablet Take 1 tablet (3 mg total) by mouth daily at bedtime as needed for sleep Take immediately before bedtime 90 tablet 0    famotidine (PEPCID) 20 mg tablet Take 1 tablet (20 mg total) by mouth daily 30 tablet 3    fluticasone (FLONASE) 50 mcg/act nasal spray 1 spray into each nostril daily 1 Bottle 1    gabapentin (NEURONTIN) 400 mg capsule Take 2 capsules (800 mg total) by mouth 3 (three) times a day 90 capsule 1    lisinopril (ZESTRIL) 20 mg tablet Take 1 tablet (20 mg total) by mouth daily 90 tablet 0    mirtazapine (REMERON) 15 mg tablet Take 1 tablet (15 mg total) by mouth daily at bedtime 90 tablet 1    naloxone (NARCAN) 4 mg/0 1 mL nasal spray Administer 1 spray into a nostril  If breathing does not return to normal or if breathing difficulty resumes after 2-3 minutes, give another dose in the other nostril using a new spray   1 each 1    nicotine (NICODERM CQ) 21 mg/24 hr TD 24 hr patch APPLY 1 PATCH EVERY DAY 28 patch 0    omeprazole (PriLOSEC) 40 MG capsule Take 1 capsule (40 mg total) by mouth daily 90 capsule 0    ondansetron (ZOFRAN) 4 mg tablet TAKE 1 TABLET (4 MG TOTAL) BY MOUTH DAILY AS NEEDED FOR NAUSEA OR VOMITING 90 tablet 0    polyethylene glycol (GLYCOLAX) powder Take 17 g by mouth 2 (two) times a day 850 g 6    psyllium (METAMUCIL) packet Take 1 packet by mouth daily 30 packet 0    traMADol (ULTRAM) 50 mg tablet Take 2 tablets (100 mg total) by mouth daily as needed for moderate pain Managed by pain specialist 1 tablet 0    venlafaxine (EFFEXOR-XR) 150 mg 24 hr capsule Take 1 capsule (150 mg total) by mouth daily 90 capsule 1    venlafaxine (EFFEXOR-XR) 75 mg 24 hr capsule Take 1 capsule (75 mg total) by mouth daily 90 capsule 1     No current facility-administered medications for this visit  Review of Systems  Video Exam    There were no vitals filed for this visit  Physical Exam   As a result of this visit, I have referred the patient for further respiratory evaluation  No    I spent 15 minutes directly with the patient during this visit  VIRTUAL VISIT DISCLAIMER    Carroll Merino acknowledges that she has consented to an online visit or consultation  She understands that the online visit is based solely on information provided by her, and that, in the absence of a face-to-face physical evaluation by the physician, the diagnosis she receives is both limited and provisional in terms of accuracy and completeness  This is not intended to replace a full medical face-to-face evaluation by the physician  Carroll Merino understands and accepts these terms      MEDICATION MANAGEMENT NOTE        [place] [address] [facility]    Name and Date of Birth:  Carroll Merino 44 y o  1970 MRN: [id number]    Date of Visit: August 14, 2020    Allergies   Allergen Reactions    Chantix [Varenicline]     Ibuprofen Other (See Comments)    Penicillins Other (See Comments)     ? hives    Sulfa Antibiotics Other (See Comments)     sloughing skin in mouth    Sulfasalazine SUBJECTIVE:    Dennis Syed is seen today for a follow up for depression and anxiety  She reports that she has decompensated slightly since the last visit  Patient reports physical health and pain have worsened significantly since last visit  Patient has new pinched nerve in neck and left side of the body that makes it very difficult and painful to walk and perform ADLs  Patient is also arguing with  frequently  Patient's primary concern psychiatrically is increase in anxiety  She denies any side effects from medications  PLAN:  -Initiate BuSpar 50 mg p o  B i d   -Referral for individual therapy  Patient is to call former therapist and seek marriage individual counseling   -Gaston Severin is effective for sleep at this time  -continue all other psychiatric medications as ordered  Aware of 24 hour and weekend coverage for urgent situations accessed by calling [facility] main practice number    Diagnoses and all orders for this visit:    Severe episode of recurrent major depressive disorder, without psychotic features ([facility] [address] )  -     gabapentin (NEURONTIN) 400 mg capsule; Take 2 capsules (800 mg total) by mouth 3 (three) times a day  -     mirtazapine (REMERON) 15 mg tablet; Take 1 tablet (15 mg total) by mouth daily at bedtime  -     venlafaxine (EFFEXOR-XR) 150 mg 24 hr capsule; Take 1 capsule (150 mg total) by mouth daily  -     venlafaxine (EFFEXOR-XR) 75 mg 24 hr capsule; Take 1 capsule (75 mg total) by mouth daily  -     busPIRone (BUSPAR) 15 mg tablet; Take 1 tablet (15 mg total) by mouth 2 (two) times a day    History of rib fracture  -     traMADol (ULTRAM) 50 mg tablet; Take 2 tablets (100 mg total) by mouth daily as needed for moderate pain Managed by pain specialist    Intercostal neuralgia  -     traMADol (ULTRAM) 50 mg tablet;  Take 2 tablets (100 mg total) by mouth daily as needed for moderate pain Managed by pain specialist    Generalized anxiety disorder  - Discontinue: clonazePAM (KlonoPIN) 0 5 mg tablet; Take 0 5 tablets (0 25 mg total) by mouth 2 (two) times a day  -     venlafaxine (EFFEXOR-XR) 75 mg 24 hr capsule; Take 1 capsule (75 mg total) by mouth daily  -     busPIRone (BUSPAR) 15 mg tablet; Take 1 tablet (15 mg total) by mouth 2 (two) times a day    Primary insomnia  -     eszopiclone (LUNESTA) 3 MG tablet; Take 1 tablet (3 mg total) by mouth daily at bedtime as needed for sleep Take immediately before bedtime        Psychotherapy Provided:     Individual psychotherapy provided: No    HPI ROS Appetite Changes and Sleep:     She reports normal sleep, adequate appetite, low energy   Patient denies suicidal or homicidal ideation    Review Of Systems:      General emotional problems and decreased functioning   Personality no change in personality   Constitutional negative   ENT negative   Cardiovascular negative   Respiratory negative   Gastrointestinal negative   Genitourinary negative   Musculoskeletal negative   Integumentary negative   Neurological negative   Endocrine negative   Other Symptoms none, all other systems are negative     Mental Status Evaluation:    Appearance Adequate hygiene and grooming   Behavior cooperative   Mood anxious and depressed  Depression Scale -  of 10 (0 = No depression)  Anxiety Scale -  of 10 (0 = No anxiety)   Speech Normal rate and volume   Affect appropriate and mood-congruent   Thought Processes Goal directed and coherent   Thought Content Does not verbalize delusional material   Associations Tightly connected   Perceptual Disturbances Denies hallucinations and does not appear to be responding to internal stimuli   Risk Potential Suicidal/Homicidal Ideation - No evidence of suicidal or homicidal ideation and Patient does not verbalize suicidal or homicidal ideation  Risk of Violence - No evidence of risk for violence found on assessment  Risk of Self Mutilation - No evidence of risk for self mutilation found on assessment Orientation oriented to person, place, time/date and situation   Memory recent and remote memory grossly intact   Consciousness alert and awake   Attention/Concentration attention span and concentration are age appropriate   Insight fair   Judgement fair   Muscle Strength and Gait normal muscle strength and normal muscle tone, normal gait/station and normal balance   Motor Activity no abnormal movements   Language no difficulty naming common objects, no difficulty repeating a phrase, no difficulty writing a sentence   Fund of Knowledge adequate knowledge of current events  adequate fund of knowledge regarding past history  adequate fund of knowledge regarding vocabulary      Past Psychiatric History Update:     Inpatient Psychiatric Admission Since Last Encounter:   no  Changes to Outpatient Psychiatric Treatment Team:    no  Suicide Attempt Or Self Mutilation Since Last Encounter:   no  Incidence of Violent Behavior Since Last Encounter:   no    Traumatic History Update:     New Onset of Abuse Since Last Encounter:   no  Traumatic Events Since Last Encounter:   no    Past Medical History:    Past Medical History:   Diagnosis Date    Chronic pain disorder     Depression     GERD (gastroesophageal reflux disease)     History of electroconvulsive therapy     Low back pain     Self-injurious behavior     Suicide attempt ([address] )      No past medical history pertinent negatives  Past Surgical History:   Procedure Laterality Date     SECTION      COLONOSCOPY      PANCREAS SURGERY      "pseudocysts" per patient's  Ricki Infante    [place] ESOPHAGOGASTRODUODENOSCOPY TRANSORAL DIAGNOSTIC N/A 4/10/2018    Procedure: EGD AND COLONOSCOPY;  Surgeon: Rosalina Brewer MD;  [place] [facility];   Service: Gastroenterology     Allergies   Allergen Reactions    Chantix [Varenicline]     Ibuprofen Other (See Comments)    Penicillins Other (See Comments)     ? hives    Sulfa Antibiotics Other (See Comments) sloughing skin in mouth    Sulfasalazine      Substance Abuse History:    Social History     Substance and Sexual Activity   Alcohol Use Yes    Frequency: Monthly or less    Comment: vodka and beer, has decreased to occasionally     Social History     Substance and Sexual Activity   Drug Use Yes    Types: Marijuana    Comment: medical     Social History:    Social History     Socioeconomic History    Marital status: /Civil Union     Spouse name: Not on file    Number of children: Not on file    Years of education: Not on file    Highest education level: Not on file   Occupational History    Occupation: disability   Social Needs    Financial resource strain: Not on file    Food insecurity     Worry: Not on file     Inability: Not on file   [employer] needs     Medical: Not on file     Non-medical: Not on file   Tobacco Use    Smoking status: Current Every Day Smoker     Packs/day: 1 00     Types: Cigarettes    Smokeless tobacco: Current User   Substance and Sexual Activity    Alcohol use: Yes     Frequency: Monthly or less     Comment: vodka and beer, has decreased to occasionally    Drug use: Yes     Types: Marijuana     Comment: medical    Sexual activity: Yes     Partners: Male     Comment: PT is    Lifestyle    Physical activity     Days per week: Not on file     Minutes per session: Not on file    Stress: Not on file   Relationships    Social connections     Talks on phone: Not on file     Gets together: Not on file     Attends Restorationist service: Not on file     Active member of club or organization: Not on file     Attends meetings of clubs or organizations: Not on file     Relationship status: Not on file    Intimate partner violence     Fear of current or ex partner: Not on file     Emotionally abused: Not on file     Physically abused: Not on file     Forced sexual activity: Not on file   Other Topics Concern    Not on file   Social History Narrative    Lives with spouse     Family Psychiatric History:     Family History   Problem Relation Age of Onset    Arthritis Mother     Coronary artery disease Mother     Colon cancer Family     Parkinsonism Father     Parkinsonism Paternal Grandmother     Coronary artery disease Paternal Grandfather     Depression Neg Hx      History Review: The following portions of the patient's history were reviewed and updated as appropriate: allergies, current medications, past family history, past medical history, past social history, past surgical history and problem list     OBJECTIVE:     Vital signs in last 24 hours: There were no vitals filed for this visit  Laboratory Results: I have personally reviewed all pertinent laboratory/tests results  Suicide/Homicide Risk Assessment:    Risk of Harm to Self:   The following ratings are based on assessment at the time of the interview   Recent Specific Risk Factors include: current depressive symptoms, current anxiety symptoms    Risk of Harm to Others:   The following ratings are based on assessment at the time of the interview   Recent Specific Risk Factors include: none  The following interventions are recommended: no intervention changes needed    Medications Risks/Benefits:      Risks, Benefits And Possible Side Effects Of Medications:    Discussed risks and benefits of treatment with patient including risk of suicidality and serotonin syndrome related to treatment with antidepressants;  Risk of induction of manic symptoms in certain patient populations and risk of rash related to treatment with Lamictal     Controlled Medication Discussion:     Jesus Manuel Bacon has been filling controlled prescriptions on time as prescribed according to [place] Prescription Drug Monitoring Program    Treatment Plan:    Due for update/Updated:   CARLITOS Marroquin 08/14/20

## 2020-08-16 DIAGNOSIS — K21.00 ESOPHAGITIS, REFLUX: ICD-10-CM

## 2020-08-17 RX ORDER — FAMOTIDINE 20 MG/1
TABLET, FILM COATED ORAL
Qty: 30 TABLET | Refills: 3 | Status: SHIPPED | OUTPATIENT
Start: 2020-08-17 | End: 2021-01-11 | Stop reason: SDUPTHER

## 2020-08-24 ENCOUNTER — OFFICE VISIT (OUTPATIENT)
Dept: FAMILY MEDICINE CLINIC | Facility: CLINIC | Age: 50
End: 2020-08-24
Payer: COMMERCIAL

## 2020-08-24 VITALS
RESPIRATION RATE: 20 BRPM | HEART RATE: 110 BPM | TEMPERATURE: 98.6 F | BODY MASS INDEX: 28.7 KG/M2 | HEIGHT: 66 IN | WEIGHT: 178.6 LBS | OXYGEN SATURATION: 98 % | DIASTOLIC BLOOD PRESSURE: 78 MMHG | SYSTOLIC BLOOD PRESSURE: 110 MMHG

## 2020-08-24 DIAGNOSIS — F33.2 SEVERE EPISODE OF RECURRENT MAJOR DEPRESSIVE DISORDER, WITHOUT PSYCHOTIC FEATURES (HCC): ICD-10-CM

## 2020-08-24 DIAGNOSIS — Z12.39 BREAST CANCER SCREENING: ICD-10-CM

## 2020-08-24 DIAGNOSIS — I10 ESSENTIAL HYPERTENSION: Primary | ICD-10-CM

## 2020-08-24 PROCEDURE — 3008F BODY MASS INDEX DOCD: CPT | Performed by: INTERNAL MEDICINE

## 2020-08-24 PROCEDURE — 3078F DIAST BP <80 MM HG: CPT | Performed by: INTERNAL MEDICINE

## 2020-08-24 PROCEDURE — 3074F SYST BP LT 130 MM HG: CPT | Performed by: INTERNAL MEDICINE

## 2020-08-24 PROCEDURE — 99214 OFFICE O/P EST MOD 30 MIN: CPT | Performed by: INTERNAL MEDICINE

## 2020-08-24 PROCEDURE — 4004F PT TOBACCO SCREEN RCVD TLK: CPT | Performed by: INTERNAL MEDICINE

## 2020-08-24 NOTE — PROGRESS NOTES
Assessment/Plan:     1  Essential hypertension  -     CBC and differential  -     Lipid panel  -     Comprehensive metabolic panel    2  Breast cancer screening  -     Mammo screening bilateral w 3d & cad; Future; Expected date: 08/24/2020    3  Severe episode of recurrent major depressive disorder, without psychotic features ([facility] [address] )        Bull Seth was seen and examined in the office today  PE does reveal a dry mouth, somewhat poor dentition, as well as small laceration of the tongue  I would like her to start biotene and would like her to also see a dentist     BP is well controlled on her current regime  I did give her a script to complete mammogram as well  In terms of her depression, she feels this is stable  She will continue to follow with psychiatry  BMI Counseling: Body mass index is 28 83 kg/m²  The BMI is above normal  Nutrition recommendations include 3-5 servings of fruits/vegetables daily  Subjective:      Patient ID: Jacques Johnson is a 44 y o  female  Bull Seth is here today with complaints of mouth soreness  She reports having some sort of cut on the side of the tongue on the right but feels the soreness everywhere  She also reports associated dry mouth  She does continue to smoke about 1 ppd as well  Chart was reviewed  She has known hypertension, depression, and problems as listed in the chart  ROS is largely negative  See discussion         The following portions of the patient's history were reviewed and updated as appropriate: allergies, past family history, past medical history, past social history, past surgical history and problem list     Current Outpatient Medications:     budesonide-formoterol (Symbicort) 160-4 5 mcg/act inhaler, Inhale 2 puffs 2 (two) times a day Rinse mouth after use , Disp: 10 2 Inhaler, Rfl: 3    busPIRone (BUSPAR) 15 mg tablet, Take 1 tablet (15 mg total) by mouth 2 (two) times a day, Disp: 180 tablet, Rfl: 1    butalbital-acetaminophen-caffeine (FIORICET,ESGIC) -40 mg per tablet, Take 1 tablet by mouth daily as needed for headaches, Disp: 90 tablet, Rfl: 0    eszopiclone (LUNESTA) 3 MG tablet, Take 1 tablet (3 mg total) by mouth daily at bedtime as needed for sleep Take immediately before bedtime, Disp: 90 tablet, Rfl: 0    famotidine (PEPCID) 20 mg tablet, TAKE 1 TABLET BY MOUTH EVERY DAY, Disp: 30 tablet, Rfl: 3    fluticasone (FLONASE) 50 mcg/act nasal spray, 1 spray into each nostril daily, Disp: 1 Bottle, Rfl: 1    gabapentin (NEURONTIN) 400 mg capsule, Take 2 capsules (800 mg total) by mouth 3 (three) times a day, Disp: 90 capsule, Rfl: 1    lisinopril (ZESTRIL) 20 mg tablet, Take 1 tablet (20 mg total) by mouth daily, Disp: 90 tablet, Rfl: 0    mirtazapine (REMERON) 15 mg tablet, Take 1 tablet (15 mg total) by mouth daily at bedtime, Disp: 90 tablet, Rfl: 1    omeprazole (PriLOSEC) 40 MG capsule, Take 1 capsule (40 mg total) by mouth daily, Disp: 90 capsule, Rfl: 0    ondansetron (ZOFRAN) 4 mg tablet, TAKE 1 TABLET (4 MG TOTAL) BY MOUTH DAILY AS NEEDED FOR NAUSEA OR VOMITING, Disp: 90 tablet, Rfl: 0    polyethylene glycol (GLYCOLAX) powder, Take 17 g by mouth 2 (two) times a day, Disp: 850 g, Rfl: 6    psyllium (METAMUCIL) packet, Take 1 packet by mouth daily, Disp: 30 packet, Rfl: 0    traMADol (ULTRAM) 50 mg tablet, Take 2 tablets (100 mg total) by mouth daily as needed for moderate pain Managed by pain specialist, Disp: 1 tablet, Rfl: 0    venlafaxine (EFFEXOR-XR) 150 mg 24 hr capsule, Take 1 capsule (150 mg total) by mouth daily, Disp: 90 capsule, Rfl: 1    venlafaxine (EFFEXOR-XR) 75 mg 24 hr capsule, Take 1 capsule (75 mg total) by mouth daily, Disp: 90 capsule, Rfl: 1    nicotine (NICODERM CQ) 21 mg/24 hr TD 24 hr patch, APPLY 1 PATCH EVERY DAY (Patient not taking: Reported on 8/24/2020), Disp: 28 patch, Rfl: 0    Review of Systems   Constitutional: Negative for chills, fever and unexpected weight change     HENT: Positive for postnasal drip  Sore mouth    Respiratory: Positive for cough  Negative for chest tightness  Chronic cough    Cardiovascular: Negative for chest pain  Gastrointestinal: Negative for abdominal pain, diarrhea, nausea and vomiting  Neurological: Positive for dizziness and light-headedness  Negative for headaches  Psychiatric/Behavioral: Negative for dysphoric mood and sleep disturbance  The patient is not nervous/anxious  Objective:      /78   Pulse (!) 110   Temp 98 6 °F (37 °C)   Resp 20   Ht 5' 6" (1 676 m)   Wt 81 kg (178 lb 9 6 oz)   SpO2 98%   BMI 28 83 kg/m²          Physical Exam  Vitals signs reviewed  Constitutional:       General: She is not in acute distress  Appearance: She is well-developed  She is not diaphoretic  HENT:      Head: Normocephalic and atraumatic  Mouth/Throat:      Mouth: Mucous membranes are dry  Comments: Superficial laceration of the right side of the tongue  Eyes:      General: No scleral icterus  Right eye: No discharge  Left eye: No discharge  Conjunctiva/sclera: Conjunctivae normal    Neck:      Musculoskeletal: Normal range of motion  Cardiovascular:      Rate and Rhythm: Normal rate and regular rhythm  Heart sounds: Normal heart sounds  No murmur  Pulmonary:      Effort: Pulmonary effort is normal  No respiratory distress  Breath sounds: Normal breath sounds  Musculoskeletal: Normal range of motion  Lymphadenopathy:      Cervical: No cervical adenopathy  Skin:     General: Skin is warm and dry  Findings: No erythema  Neurological:      Mental Status: She is alert and oriented to person, place, and time  Psychiatric:         Speech: Speech normal          Behavior: Behavior normal          Thought Content:  Thought content normal          Judgment: Judgment normal

## 2020-09-09 ENCOUNTER — TELEPHONE (OUTPATIENT)
Dept: FAMILY MEDICINE CLINIC | Facility: CLINIC | Age: 50
End: 2020-09-09

## 2020-09-09 NOTE — TELEPHONE ENCOUNTER
Hm okay  I cant remember if she saw a dentist in between  Can she remind me?  I also may have her see ENT (Dr Evern Rubinstein)

## 2020-09-09 NOTE — TELEPHONE ENCOUNTER
PT CALLED TO GIVE THA VICENTE AN UPDATE ON MOUTH SORES, PT SAID THINGS ARE NOT GOOD SEEMS WORSE GETTING MORE FEELS LIKE IN HER THROAT

## 2020-09-10 DIAGNOSIS — K13.79 MOUTH SORES: Primary | ICD-10-CM

## 2020-09-10 NOTE — TELEPHONE ENCOUNTER
Pt called her dentist, and they stated to call PCP to handle mouth sores  Pt has been taking biotene dry mouth oral rinse  not helping for sores in mouth  By end of day mouth is very sore  pt stated that she took one bite chocolate last night and was in lots of pain could not even eat it  She is having difficulty eating in general  When pt drinks coffee in the morning there is not much of an effect to the sores in her mouth, but mainly staying to water  Please advise  Pt called back and wanted to add to her call from earlier  Pt stated that on the right side throat is sore, and discomfort in ear, down to jaw line at the back of the ear  Thank you!

## 2020-09-10 NOTE — TELEPHONE ENCOUNTER
Understood but I am not sure exactly what is causing this and therefore what could help it  I am going to have her see ENT  There is something called magic mouthwash which is a numbing type of rinse and we can try that in between as well  Would she like me to send it?  Please give her Dr Shaar Snyder information as well and will place consult

## 2020-09-11 NOTE — TELEPHONE ENCOUNTER
I called pt gave the above message she will call back to let us know if she wants the script for the magic mouth wash

## 2020-09-17 DIAGNOSIS — K13.79 MOUTH SORES: Primary | ICD-10-CM

## 2020-09-17 NOTE — TELEPHONE ENCOUNTER
[facility] and would like to try the magic mouth wash  Thank you!

## 2020-09-28 DIAGNOSIS — K21.00 ESOPHAGITIS, REFLUX: ICD-10-CM

## 2020-09-28 RX ORDER — ONDANSETRON 4 MG/1
4 TABLET, FILM COATED ORAL DAILY PRN
Qty: 90 TABLET | Refills: 0 | Status: SHIPPED | OUTPATIENT
Start: 2020-09-28 | End: 2020-12-10 | Stop reason: SDUPTHER

## 2020-10-08 PROCEDURE — 88341 IMHCHEM/IMCYTCHM EA ADD ANTB: CPT | Performed by: PATHOLOGY

## 2020-10-08 PROCEDURE — 88342 IMHCHEM/IMCYTCHM 1ST ANTB: CPT | Performed by: PATHOLOGY

## 2020-10-08 PROCEDURE — 88305 TISSUE EXAM BY PATHOLOGIST: CPT | Performed by: PATHOLOGY

## 2020-10-08 PROCEDURE — 88312 SPECIAL STAINS GROUP 1: CPT | Performed by: PATHOLOGY

## 2020-10-20 DIAGNOSIS — I10 ESSENTIAL HYPERTENSION: ICD-10-CM

## 2020-10-20 RX ORDER — LISINOPRIL 20 MG/1
20 TABLET ORAL DAILY
Qty: 90 TABLET | Refills: 0 | Status: SHIPPED | OUTPATIENT
Start: 2020-10-20 | End: 2022-05-25 | Stop reason: ALTCHOICE

## 2020-11-04 NOTE — TELEPHONE ENCOUNTER
[facility] Intake Assessment     Presenting Stressors: Patient was admitted on 05-23-18 due to suicide attempt od on lithium  Life stressors financial and relationship stressors  Referral Source: Dr Oj Dahl   she is employed at No  Access to weapons? No  Is she a smoker? Yes    Symptoms: recent attempt: od on lithium  depressed mood anxiety poor sleep      No current facility-administered medications for this visit  No current outpatient prescriptions on file      Facility-Administered Medications Ordered in Other Visits:     ALPRAZolam (XANAX) tablet 1 mg, 1 mg, Oral, Q6H PRN, Reggie Matthew, 1 mg at 05/26/18 1331    aluminum-magnesium hydroxide-simethicone (MYLANTA) 200-200-20 mg/5 mL oral suspension 30 mL, 30 mL, Oral, Q4H PRN, Sammi Murray PA-C    benztropine (COGENTIN) injection 1 mg, 1 mg, Intramuscular, Q6H PRN, Sammi Murray PA-C    benztropine (COGENTIN) tablet 1 mg, 1 mg, Oral, Q6H PRN, Sammi Murray PA-C    clonazePAM (KlonoPIN) tablet 1 mg, 1 mg, Oral, BID, Reggie Matthew, 1 mg at 05/29/18 3848    gabapentin (NEURONTIN) capsule 300 mg, 300 mg, Oral, TID, Reggie Matthew, 300 mg at 05/29/18 5391    haloperidol (HALDOL) tablet 5 mg, 5 mg, Oral, Q8H PRN, Sammi Murray PA-C, 5 mg at 05/23/18 6103    haloperidol lactate (HALDOL) injection 5 mg, 5 mg, Intramuscular, Q6H PRN, Sammi Murray PA-C    lisinopril (ZESTRIL) tablet 20 mg, 20 mg, Oral, Daily, CARLITOS Bardales, 20 mg at 05/29/18 0833    LORazepam (ATIVAN) 2 mg/mL injection 1 mg, 1 mg, Intramuscular, Q8H PRN, Sammi Murray PA-C    magnesium hydroxide (MILK OF MAGNESIA) 400 mg/5 mL oral suspension 30 mL, 30 mL, Oral, Daily PRN, Sammi Murray PA-C, 30 mL at 05/24/18 1115    mirtazapine (REMERON) tablet 15 mg, 15 mg, Oral, HS, Reggie Matthew, 15 mg at 05/28/18 2124    naproxen (NAPROSYN) tablet 375 mg, 375 mg, Oral, BID PRN, Reggie Matthew, 375 mg at 05/29/18 0350    nicotine (NICODERM CQ) 14 mg/24hr TD 24 hr patch 1 patch, 1 patch, Transdermal, Daily, Sammi Murray PA-C, 1 patch at 05/29/18 1972    nicotine polacrilex (NICORETTE) gum 2 mg, 2 mg, Oral, Q2H PRN, Kavita Rasmussen MD, 2 mg at 05/28/18 2127    OLANZapine (ZyPREXA) IM injection 5 mg, 5 mg, Intramuscular, Q3H PRN, Sammi Murray PA-C    OLANZapine (ZyPREXA) tablet 5 mg, 5 mg, Oral, Q3H PRN, Sammi Murray PA-C    pantoprazole (PROTONIX) EC tablet 40 mg, 40 mg, Oral, Early Morning, Ester Figueroa PA-C, 40 mg at 05/29/18 0700    propranolol (INDERAL) tablet 10 mg, 10 mg, Oral, BID, Reggie Matthew, 10 mg at 05/29/18 7842    traMADol (ULTRAM) tablet 100 mg, 100 mg, Oral, Q8H PRN, Reggie Matthew, 100 mg at 05/28/18 2348    venlafaxine (EFFEXOR-XR) 24 hr capsule 75 mg, 75 mg, Oral, Daily, Reggie Matthew, 75 mg at 05/29/18 8850    Medications: See Above    Allergies   Allergen Reactions    Sulfa Antibiotics        Allergies: See Above    Provisional Diagnosis:   Axis I:Servere Episode of MDD recurrent   Axis II: ALVIN    Substance Abuse:No concerns of substance abuse are reported      Psychiatric Treatment History:     Current psychiatrist: None  Therapist: None  Community Agency Supports: No  The patient requires ambulatory assistance: No    Legal Issues: No Legal    Action: record recieved and laboratory work received    ACCEPTED Appointment Date: May 31, 2018    DENIED Reason: None Complex Repair And Split-Thickness Skin Graft Text: The defect edges were debeveled with a #15 scalpel blade.  The primary defect was closed partially with a complex linear closure.  Given the location of the defect, shape of the defect and the proximity to free margins a split thickness skin graft was deemed most appropriate to repair the remaining defect.  The graft was trimmed to fit the size of the remaining defect.  The graft was then placed in the primary defect, oriented appropriately, and sutured into place.

## 2020-11-05 DIAGNOSIS — K21.00 ESOPHAGITIS, REFLUX: ICD-10-CM

## 2020-11-05 RX ORDER — OMEPRAZOLE 40 MG/1
40 CAPSULE, DELAYED RELEASE ORAL DAILY
Qty: 90 CAPSULE | Refills: 0 | Status: SHIPPED | OUTPATIENT
Start: 2020-11-05 | End: 2021-01-26 | Stop reason: SDUPTHER

## 2020-11-09 ENCOUNTER — TELEMEDICINE (OUTPATIENT)
Dept: PSYCHIATRY | Facility: CLINIC | Age: 50
End: 2020-11-09

## 2020-11-09 ENCOUNTER — TELEPHONE (OUTPATIENT)
Dept: PSYCHIATRY | Facility: CLINIC | Age: 50
End: 2020-11-09

## 2020-11-09 DIAGNOSIS — F51.01 PRIMARY INSOMNIA: ICD-10-CM

## 2020-11-09 DIAGNOSIS — F33.2 SEVERE EPISODE OF RECURRENT MAJOR DEPRESSIVE DISORDER, WITHOUT PSYCHOTIC FEATURES (HCC): ICD-10-CM

## 2020-11-09 DIAGNOSIS — F41.1 GENERALIZED ANXIETY DISORDER: ICD-10-CM

## 2020-11-09 PROCEDURE — 99213 OFFICE O/P EST LOW 20 MIN: CPT | Performed by: NURSE PRACTITIONER

## 2020-11-09 RX ORDER — ESZOPICLONE 3 MG/1
3 TABLET, FILM COATED ORAL
Qty: 30 TABLET | Refills: 2 | Status: SHIPPED | OUTPATIENT
Start: 2020-11-09 | End: 2021-01-29 | Stop reason: SDUPTHER

## 2020-11-09 RX ORDER — BUSPIRONE HYDROCHLORIDE 15 MG/1
TABLET ORAL
Qty: 75 TABLET | Refills: 2 | Status: SHIPPED | OUTPATIENT
Start: 2020-11-09 | End: 2020-12-22

## 2020-11-17 ENCOUNTER — TELEPHONE (OUTPATIENT)
Dept: FAMILY MEDICINE CLINIC | Facility: CLINIC | Age: 50
End: 2020-11-17

## 2020-11-19 ENCOUNTER — TELEPHONE (OUTPATIENT)
Dept: PSYCHIATRY | Facility: CLINIC | Age: 50
End: 2020-11-19

## 2020-11-19 DIAGNOSIS — F51.01 PRIMARY INSOMNIA: ICD-10-CM

## 2020-12-10 DIAGNOSIS — K21.00 ESOPHAGITIS, REFLUX: ICD-10-CM

## 2020-12-10 RX ORDER — ONDANSETRON 4 MG/1
4 TABLET, FILM COATED ORAL DAILY PRN
Qty: 90 TABLET | Refills: 1 | Status: SHIPPED | OUTPATIENT
Start: 2020-12-10 | End: 2022-05-25 | Stop reason: ALTCHOICE

## 2020-12-22 ENCOUNTER — TELEPHONE (OUTPATIENT)
Dept: PSYCHIATRY | Facility: CLINIC | Age: 50
End: 2020-12-22

## 2020-12-22 ENCOUNTER — TELEMEDICINE (OUTPATIENT)
Dept: PSYCHIATRY | Facility: CLINIC | Age: 50
End: 2020-12-22

## 2020-12-22 DIAGNOSIS — F51.01 PRIMARY INSOMNIA: ICD-10-CM

## 2020-12-22 DIAGNOSIS — F33.2 SEVERE EPISODE OF RECURRENT MAJOR DEPRESSIVE DISORDER, WITHOUT PSYCHOTIC FEATURES (HCC): Primary | ICD-10-CM

## 2020-12-22 PROCEDURE — 90833 PSYTX W PT W E/M 30 MIN: CPT | Performed by: NURSE PRACTITIONER

## 2020-12-22 PROCEDURE — 99214 OFFICE O/P EST MOD 30 MIN: CPT | Performed by: NURSE PRACTITIONER

## 2020-12-22 RX ORDER — ARIPIPRAZOLE 2 MG/1
2 TABLET ORAL DAILY
Qty: 30 TABLET | Refills: 2 | Status: SHIPPED | OUTPATIENT
Start: 2020-12-22 | End: 2021-03-09

## 2020-12-22 RX ORDER — ESZOPICLONE 3 MG/1
3 TABLET, FILM COATED ORAL
Qty: 10 TABLET | Refills: 0 | Status: SHIPPED | OUTPATIENT
Start: 2020-12-22 | End: 2021-03-09

## 2021-01-11 DIAGNOSIS — K21.00 ESOPHAGITIS, REFLUX: ICD-10-CM

## 2021-01-11 RX ORDER — FAMOTIDINE 20 MG/1
20 TABLET, FILM COATED ORAL DAILY
Qty: 30 TABLET | Refills: 3 | Status: SHIPPED | OUTPATIENT
Start: 2021-01-11 | End: 2022-02-06

## 2021-01-11 NOTE — TELEPHONE ENCOUNTER
Patient called for a medication refill of:    Famotidine (PEPCID) 20 mg tablet  #30 / R-3    Requested it to be sent to new pharmacy:     [address], [address]  8/24/2020  Future OV  None scheduled

## 2021-01-26 DIAGNOSIS — K21.00 ESOPHAGITIS, REFLUX: ICD-10-CM

## 2021-01-26 RX ORDER — OMEPRAZOLE 40 MG/1
40 CAPSULE, DELAYED RELEASE ORAL DAILY
Qty: 90 CAPSULE | Refills: 0 | Status: SHIPPED | OUTPATIENT
Start: 2021-01-26 | End: 2021-04-08 | Stop reason: SDUPTHER

## 2021-01-26 NOTE — TELEPHONE ENCOUNTER
Patient called requesting a refill of:    Omeprazole (PriLOSEC) 40 MG capsule  #90 / R-0    Last OV  8/24/2020  Future OV  None scheduled

## 2021-01-27 ENCOUNTER — TELEPHONE (OUTPATIENT)
Dept: PSYCHIATRY | Facility: CLINIC | Age: 51
End: 2021-01-27

## 2021-01-27 NOTE — TELEPHONE ENCOUNTER
Avelina Vazquez called to get an appointment with a Therapist  She is current patient of Tamiko Shell

## 2021-01-29 DIAGNOSIS — F51.01 PRIMARY INSOMNIA: ICD-10-CM

## 2021-01-29 RX ORDER — ESZOPICLONE 3 MG/1
3 TABLET, FILM COATED ORAL
Qty: 30 TABLET | Refills: 2 | Status: SHIPPED | OUTPATIENT
Start: 2021-01-29 | End: 2021-03-09

## 2021-02-26 ENCOUNTER — TELEPHONE (OUTPATIENT)
Dept: OTHER | Facility: OTHER | Age: 51
End: 2021-02-26

## 2021-02-26 NOTE — TELEPHONE ENCOUNTER
Patient is having severe itching (for about a month) and a lot of pain in neck and shoulders - started Tuesday  She would like an appointment with Dr Nabor Linda

## 2021-03-03 ENCOUNTER — OFFICE VISIT (OUTPATIENT)
Dept: FAMILY MEDICINE CLINIC | Facility: CLINIC | Age: 51
End: 2021-03-03

## 2021-03-03 VITALS
TEMPERATURE: 97.8 F | HEIGHT: 66 IN | DIASTOLIC BLOOD PRESSURE: 82 MMHG | WEIGHT: 139.2 LBS | HEART RATE: 96 BPM | RESPIRATION RATE: 18 BRPM | SYSTOLIC BLOOD PRESSURE: 128 MMHG | BODY MASS INDEX: 22.37 KG/M2 | OXYGEN SATURATION: 98 %

## 2021-03-03 DIAGNOSIS — J44.9 COPD WITHOUT EXACERBATION (HCC): ICD-10-CM

## 2021-03-03 DIAGNOSIS — K21.9 GASTROESOPHAGEAL REFLUX DISEASE WITHOUT ESOPHAGITIS: Primary | ICD-10-CM

## 2021-03-03 DIAGNOSIS — G47.00 INSOMNIA, UNSPECIFIED TYPE: ICD-10-CM

## 2021-03-03 DIAGNOSIS — G58.8 INTERCOSTAL NEURALGIA: ICD-10-CM

## 2021-03-03 DIAGNOSIS — G89.29 CHRONIC RIGHT SHOULDER PAIN: ICD-10-CM

## 2021-03-03 DIAGNOSIS — M51.37 DDD (DEGENERATIVE DISC DISEASE), LUMBOSACRAL: ICD-10-CM

## 2021-03-03 DIAGNOSIS — L29.9 PRURITUS: ICD-10-CM

## 2021-03-03 DIAGNOSIS — R00.0 TACHYCARDIA: ICD-10-CM

## 2021-03-03 DIAGNOSIS — Z87.81 HISTORY OF RIB FRACTURE: ICD-10-CM

## 2021-03-03 DIAGNOSIS — Z72.0 TOBACCO ABUSE: ICD-10-CM

## 2021-03-03 DIAGNOSIS — F51.01 PRIMARY INSOMNIA: ICD-10-CM

## 2021-03-03 DIAGNOSIS — F41.1 GENERALIZED ANXIETY DISORDER: ICD-10-CM

## 2021-03-03 DIAGNOSIS — M25.511 CHRONIC RIGHT SHOULDER PAIN: ICD-10-CM

## 2021-03-03 DIAGNOSIS — E78.5 HYPERLIPIDEMIA, UNSPECIFIED HYPERLIPIDEMIA TYPE: ICD-10-CM

## 2021-03-03 PROCEDURE — 99214 OFFICE O/P EST MOD 30 MIN: CPT | Performed by: FAMILY MEDICINE

## 2021-03-03 NOTE — PROGRESS NOTES
FAMILY PRACTICE OFFICE VISIT    NAME: Jonathan Sanford    AGE: 44 y o  SEX: female  : 1970   MRN: [id number]    DATE: 3/3/2021  TIME: 6:55 PM    Assessment and Plan   1  Gastroesophageal reflux disease without esophagitis  Pt is taking PPI  Discussed possible adr's of long term PPI use  Urge tob cessation  Pt had egd years ago  Pt taking zofran daily for chronic nausea  She is unsure if she was tested for gastroparesis  Discussed she should return to GI given h/o ischemic bowel, ongoing GERD and need for colonoscopy when due  H/o ulcer - so will aviod nsaids or armando 2 (-) for pain          2  DDD (degenerative disc disease), lumbosacral  H/o seeing pain management for low back pain, shoulder pain and intercostal pain  Was also getting epidural and cervical nerve blocks  H/o taking ultram 100 mg po tid  And gabapentin 800 mg po tid  H/o taking medical marijuana  Pt would like to restart on ultram  However, she must stop zofran due to concern for possible serotonin syndrome  Referral back to pain management for ongoing treatment and medication management therefore no controlled substance agreement generated today  Checked pa-dmp - no red flags  Pt did fill rx for lunesta over a mo ago - no drug interactions with ultram    3  Hyperlipidemia, unspecified hyperlipidemia type  Urge labs  Pt is currently without insurance  4  Generalized anxiety disorder  Strongly urge counseling  She is returning to psychiatrist next week - dr Estuardo Redding - she stopped her meds and will discuss with him treatment options  Note - pt is to discuss avoiding SSRI's and SNRI's with psych as interaction with ultram can cause serotonin syndrome  Pt with h/o suicide attempt - denies SI or HI  Will also be getting counseling thru psych office  5  History of rib fracture  Chronic pain  H/o intercostal injections with pain management  Return for further eval/treatment  Referral to [facility] pain management      6   Tobacco abuse  Urge tob cessation  Will need to begin lung cancer screening with CT chest age 51 yo         7  Primary insomnia  Suspect multifactorial  May ultimately need sleep study  Pt can also discuss with psychiatry  8  COPD without exacerbation ([facility] [address] )  Pt will check cost of albuterol  And call if she wants rx  She can check with good Rx       9  Pruritus  Advise beginning with labs  And then return visit  If no etiology found in bloodwork - will need to send to derm  Suspect pt may have element of neurotic dermatitis/excoriation - itch/scratch cycle  Discussed moisturizers and hypoallergenic soap          10  Tachycardia  Suspect secondary to anxiety  Refer to behavioral health  Pulse did trend down somewhat by end of visit  Avoid caffeine  Monitor pulse at home  May need cardiac workup - ie: holter/echo/ecg if not trending down at followup  11  Insomnia, unspecified type  As above  Pt filled rx for lunesta over a month ago    12  Chronic right shoulder pain  Suspect radicular pain from neck  May need ortho input pending eval with pain management    13  Intercostal neuralgia  As above  14   Adnexal cyst right  Found incidentally on CT done 5/2020: There is a right adnexal cyst which measures 3 9 x 3 2 cm x 3 9 cm  The left ovary is now identified  Advise gyn exam as pt will need further followup and imaging  AVS:  Check cost of ventolin or proair inhalers - rescue inhalers for COPD  Call if you want rx sent in    Referral to complex care management to discuss insurance issues  Referral to pain management    Recommend returning to GI given ongoing nausea  May need another EGD or workup for gastroparesis        Fasting labs  Patient to call for results if he/she does not hear from us    Ultimately - you will need pap, mammogram  Colonoscopy    Recommend flu shot, covid vaccine, shingrix vaccine    Try dove hypoallergenic soap  Cocoa butter - queen meek    Begin weaning off zofran by taking every other day for a week then stop  Do not start ultram until after you are completely off the zofran  And begin with ultram 50 mg at bedtime as needed for pain  Then increase to 50 mg up to 2x/day as needed for pain  And followup with pain management for ongoing med management  Caution for sedation with ultram    Discuss with psychiatrist - not taking an SSRI or SNRI with ultram  Due to concern over serotonin syndrome    Recommend gyn for ovarian cyst and for routine pap      Chief Complaint     Chief Complaint   Patient presents with    Follow-up       History of Present Illness   Marino Pascual is a 44y o -year-old female who presents today as a new patient to me  Former dr Cy Queen patient  Pt stopped taking her medications as of 2/3/2020 and pain meds she stopped in 9/2020 due to cost of meds  Pt's  lost his job in 10/2020  Concerns:  Pruritis - feels like it is coming from inside  Has tried creams for 'dry skin'  No rash      Review of Systems   Review of Systems   Constitutional: Positive for unexpected weight change  Hot flushes  Lost 13 # in the past 6 mos  Usual weight at home is 131 #   Eyes: Positive for itching  Dry eyes  Respiratory: Positive for cough and shortness of breath  Negative for wheezing  MORENO - unchanged  Does not have a rescue MDI      Cardiovascular:        Pt feels that her hands and feet are retaining water  They feel tight but not seeing swelling in feet  Gastrointestinal: Positive for nausea  Pt taking PPI daily  Also taking zofran in the am     Genitourinary:        Menopause about 15 mos ago - natural - no hrt  Has not been to gyn   Musculoskeletal:        Right shoulder pain  Was getting injections in past with pain management  Chronic back pain  And intercostal pain  Pt admits that hands are swollen in am but then go down as day goes on   Skin: Negative for rash  Pruritis is worse at night or if hot    No rash   Hands get raw and cracked  Using otc lotions  Neurological: Negative for seizures and syncope  Psychiatric/Behavioral: Positive for dysphoric mood and sleep disturbance  Negative for self-injury and suicidal ideas  The patient is nervous/anxious  Pt experiences severe itching at night preventing sleep  Sleeping only about 4 hours/night - which are broken up  Also has night sweats   Denies SI but h/o suicide attempt a couple of years ago  Pt seeing psychiatrist to restart on meds next week - 3/9/2021         Active Problem List     Patient Active Problem List   Diagnosis    Irritable bowel syndrome with diarrhea    Hypertension    Hyperlipidemia    Severe episode of recurrent major depressive disorder, without psychotic features ([facility] [address] )    [facility] without exacerbation ([facility])    Generalized anxiety disorder    Right knee pain    History of rib fracture    Tobacco abuse    Chondromalacia patellae    DDD (degenerative disc disease), lumbosacral    Primary insomnia    Overweight (BMI 25 0-29  9)    Hematochezia    Intercostal neuralgia    GERD (gastroesophageal reflux disease)    Colitis    Internal hemorrhoids    Adnexal cyst         Past Medical History:  Past Medical History:   Diagnosis Date    Chronic pain disorder     Depression     GERD (gastroesophageal reflux disease)     History of electroconvulsive therapy     Low back pain     Self-injurious behavior     Su[facility] attempt [facility])        Past Surgical History:  Past Surgical History:   Procedure Laterality Date     SECTION      COLONOSCOPY      PANCREAS SURGERY      "pseudocysts" per patient's  Ricki Olson    [place] ESOPHAGOGASTRODUODENOSCOPY TRANSORAL DIAGNOSTIC N/A 4/10/2018    Procedure: EGD AND COLONOSCOPY;  Surgeon: Addie León MD;  [place] [facility];   Service: Gastroenterology       Family History:  Family History   Problem Relation Age of Onset    Arthritis Mother     Coronary artery disease Mother     Colon cancer Family     Parkinsonism Father     Parkinsonism Paternal Grandmother     Coronary artery disease Paternal Grandfather     Depression Neg Hx        Social History:  Social History     Socioeconomic History    Marital status: /Civil Union     Spouse name: Not on file    Number of children: Not on file    Years of education: Not on file    Highest education level: Not on file   Occupational History    Occupation: disability   Social Needs    Financial resource strain: Not on file    Food insecurity     Worry: Not on file     Inability: Not on file   [employer] needs     Medical: Not on file     Non-medical: Not on file   Tobacco Use    Smoking status: Current Every Day Smoker     Packs/day: 1 00     Years: 35 00     Pack years: 35 00     Types: Cigarettes    Smokeless tobacco: Current User   Substance and Sexual Activity    Alcohol use: Yes     Frequency: Monthly or less     Comment: vodka and beer, has decreased to occasionally    Drug use: Not Currently     Types: Marijuana     Comment: medical    Sexual activity: Yes     Partners: Male     Comment: PT is    Lifestyle    Physical activity     Days per week: Not on file     Minutes per session: Not on file    Stress: Not on file   Relationships    Social connections     Talks on phone: Not on file     Gets together: Not on file     Attends Orthodox service: Not on file     Active member of club or organization: Not on file     Attends meetings of clubs or organizations: Not on file     Relationship status: Not on file    Intimate partner violence     Fear of current or ex partner: Not on file     Emotionally abused: Not on file     Physically abused: Not on file     Forced sexual activity: Not on file   Other Topics Concern    Not on file   Social History Narrative    Lives with spouse       Objective     Vitals:    03/03/21 1831   BP: 128/82   Pulse: (!) 118   Resp: 18   Temp: 97 8 °F (36 6 °C)   SpO2: 98%     Wt Readings from Last 3 Encounters:   03/03/21 63 1 kg (139 lb 3 2 oz)   10/08/20 67 1 kg (148 lb)   09/22/20 68 9 kg (152 lb)       Physical Exam  Vitals signs and nursing note reviewed  Constitutional:       General: She is not in acute distress  Appearance: Normal appearance  She is normal weight  She is not ill-appearing or toxic-appearing  HENT:      Right Ear: Tympanic membrane normal       Left Ear: Tympanic membrane normal       Mouth/Throat:      Mouth: Mucous membranes are moist       Pharynx: Oropharynx is clear  No oropharyngeal exudate or posterior oropharyngeal erythema  Eyes:      General: No scleral icterus  Extraocular Movements: Extraocular movements intact  Pupils: Pupils are equal, round, and reactive to light  Neck:      Musculoskeletal: Normal range of motion  No neck rigidity  Vascular: No carotid bruit  Comments: No carotid bruits    Cardiovascular:      Rate and Rhythm: Normal rate and regular rhythm  Heart sounds: No murmur  Pulmonary:      Effort: Pulmonary effort is normal  No respiratory distress  Breath sounds: Normal breath sounds  No stridor  No wheezing, rhonchi or rales  Abdominal:      General: Abdomen is flat  There is no distension  Palpations: There is no mass  Tenderness: There is no abdominal tenderness  There is no guarding  Musculoskeletal:         General: Tenderness present  Right lower leg: No edema  Left lower leg: No edema  Comments: Decreased range of motion lumbar spine  Right shoulder - difficult with ROM due to pain but patient able to abduct over head  (-) impingement sign  Some tenderness over glenoid bursa  No gross lower ext edema  Feet examined as well - and no edema     Lymphadenopathy:      Cervical: No cervical adenopathy  Skin:     General: Skin is warm  Coloration: Skin is not jaundiced  Findings: No rash        Comments: Areas of scabbing b/l arms and hands  No gross rash noted   Skin slightly dry  No overt hand edema     Neurological:      General: No focal deficit present  Mental Status: She is alert and oriented to person, place, and time  Cranial Nerves: No cranial nerve deficit  Psychiatric:         Thought Content: Thought content normal          Judgment: Judgment normal       Comments: Pt anxious during exam  Denies SI  Pt expressing a lot of pain            Pertinent Laboratory/Diagnostic Studies:  Lab Results   Component Value Date    GLUCOSE 76 06/25/2017    BUN 6 05/14/2020    CREATININE 0 89 05/14/2020    CALCIUM 8 4 05/14/2020    K 4 0 05/14/2020    CO2 29 05/14/2020     05/14/2020     Lab Results   Component Value Date    ALT 13 05/14/2020    AST 14 05/14/2020    ALKPHOS 85 05/14/2020       Lab Results   Component Value Date    WBC 8 32 05/14/2020    HGB 12 1 05/14/2020    HCT 39 7 05/14/2020     (H) 05/14/2020     05/14/2020       No results found for: TSH    No results found for: CHOL  Lab Results   Component Value Date    TRIG 186 (H) 05/24/2018     Lab Results   Component Value Date    HDL 75 (H) 05/24/2018     Lab Results   Component Value Date    LDLCALC 110 (H) 05/24/2018     No results found for: HGBA1C    Results for orders placed or performed in visit on 10/08/20   Tissue Exam   Result Value Ref Range    Case Report       Surgical Pathology Report                         Case: [id number]                                   Authorizing Provider:  Neil Jurado DO          Collected:           10/08/2020 1027              Ordering [place]     [facility]   Received:            10/08/2020 1027              Pathologist:           Dario Ugalde MD                                                             Specimen:    Tongue                                                                                     Final Diagnosis       A  Tongue, ulcer biopsy:  - Frictional keratosis with ulceration      -- No viral inclusions; HSV I & II immunostain negative  -- Special stain for fungus (GMS) reveals rare questionable fungal hyphae within fibrinopurulent debris cannot        exclude fungal colonization     - Negative for dysplasia and malignancy on multiple examined levels  See Note  -- Pankeratin (CKAE1/3) immunostain confirms no invasive carcinoma  Note       The biopsy is small and superficial  Clinical correlation is required to ensure adequate representative sampling of the lesion/ulcer  Additional Information       All reported additional testing was performed with appropriately reactive controls  These tests were developed and their performance characteristics determined by [facility] or appropriate performing facility, though some tests may be performed on tissues which have not been validated for performance characteristics (such as staining performed on alcohol exposed cell blocks and decalcified tissues)  Results should be interpreted with caution and in the context of the patients clinical condition  These tests may not be cleared or approved by the [place]  Food and Drug Administration, though the FDA has determined that such clearance or approval is not necessary  These tests are used for clinical purposes and they should not be regarded as investigational or for research  This laboratory has been approved by CLIA 88, designated as a high-complexity laboratory and is qualified to perform these tests  Interpretation performed at [facility], [address] [place] [address]  Gross Description       A  The specimen is received in formalin, labeled with the patient's name and hospital number, and is designated "tongue  The specimen consists of a single tan white rubbery irregularly-shaped soft tissue fragments measuring 0 3 x 0 2 x 0 2 cm  The presumed mucosal surface is inked red and the presumed margin of resection is inked green    The specimen is entirely submitted between sponges, 1 cassette  Note: The estimated total formalin fixation time based upon information provided by the submitting clinician and the standard processing schedule is under 72 hours  Cinthia          Clinical Information chronic ulcer        No orders of the defined types were placed in this encounter  ALLERGIES:  Allergies   Allergen Reactions    Chantix [Varenicline]     Ibuprofen Other (See Comments)    Penicillins Other (See Comments)     ? hives    Sulfa Antibiotics Other (See Comments)     sloughing skin in mouth    Sulfasalazine        Current Medications     Current Outpatient Medications   Medication Sig Dispense Refill    omeprazole (PriLOSEC) 40 MG capsule Take 1 capsule (40 mg total) by mouth daily 90 capsule 0    ondansetron (ZOFRAN) 4 mg tablet Take 1 tablet (4 mg total) by mouth daily as needed for nausea or vomiting 90 tablet 1    al mag oxide-diphenhydramine-lidocaine viscous (MAGIC MOUTHWASH) 1:1:1 suspension Swish and spit 10 mL every 4 (four) hours as needed for mouth pain or discomfort (Patient not taking: Reported on 3/3/2021) 500 mL 1    ARIPiprazole (ABILIFY) 2 mg tablet Take 1 tablet (2 mg total) by mouth daily (Patient not taking: Reported on 3/3/2021) 30 tablet 2    budesonide-formoterol (Symbicort) 160-4 5 mcg/act inhaler Inhale 2 puffs 2 (two) times a day Rinse mouth after use   (Patient not taking: Reported on 3/3/2021) 10 2 Inhaler 3    butalbital-acetaminophen-caffeine (FIORICET,ESGIC) -40 mg per tablet Take 1 tablet by mouth daily as needed for headaches (Patient not taking: Reported on 3/3/2021) 90 tablet 0    eszopiclone (LUNESTA) 3 MG tablet Take 1 tablet (3 mg total) by mouth daily at bedtime as needed for sleep Take immediately before bedtime (Patient not taking: Reported on 3/3/2021) 10 tablet 0    eszopiclone (LUNESTA) 3 MG tablet Take 1 tablet (3 mg total) by mouth daily at bedtime as needed for sleep Take immediately before bedtime (Patient not taking: Reported on 3/3/2021) 30 tablet 2    famotidine (PEPCID) 20 mg tablet Take 1 tablet (20 mg total) by mouth daily (Patient not taking: Reported on 3/3/2021) 30 tablet 3    fluticasone (FLONASE) 50 mcg/act nasal spray 1 spray into each nostril daily (Patient not taking: Reported on 3/3/2021) 1 Bottle 1    lisinopril (ZESTRIL) 20 mg tablet Take 1 tablet (20 mg total) by mouth daily (Patient not taking: Reported on 3/3/2021) 90 tablet 0    mirtazapine (REMERON) 15 mg tablet Take 1 tablet (15 mg total) by mouth daily at bedtime (Patient not taking: Reported on 3/3/2021) 90 tablet 1    nicotine (NICODERM CQ) 21 mg/24 hr TD 24 hr patch APPLY 1 PATCH EVERY DAY (Patient not taking: Reported on 10/8/2020) 28 patch 0    oxyCODONE-acetaminophen (PERCOCET) 5-325 mg per tablet Take 1 tablet by mouth every 6 (six) hours as needed for moderate painMax Daily Amount: 4 tablets (Patient not taking: Reported on 3/3/2021) 10 tablet 0    polyethylene glycol (GLYCOLAX) powder Take 17 g by mouth 2 (two) times a day (Patient not taking: Reported on 3/3/2021) 850 g 6    psyllium (METAMUCIL) packet Take 1 packet by mouth daily (Patient not taking: Reported on 3/3/2021) 30 packet 0    traMADol (ULTRAM) 50 mg tablet Take 2 tablets (100 mg total) by mouth daily as needed for moderate pain Managed by pain specialist (Patient not taking: Reported on 3/3/2021) 1 tablet 0    triamcinolone (KENALOG) 0 1 % oral topical paste Apply after meals and at bedtime x 14 days (Patient not taking: Reported on 3/3/2021) 5 g 1    venlafaxine (EFFEXOR-XR) 150 mg 24 hr capsule Take 1 capsule (150 mg total) by mouth daily (Patient not taking: Reported on 3/3/2021) 90 capsule 1    venlafaxine (EFFEXOR-XR) 75 mg 24 hr capsule Take 1 capsule (75 mg total) by mouth daily (Patient not taking: Reported on 3/3/2021) 90 capsule 1     No current facility-administered medications for this visit            [facility] Maintenance   Topic Date Due    MAMMOGRAM  1970    HIV Screening  08/26/1983    Annual Physical  08/26/1986    Influenza Vaccine (1) 09/01/2020    BMI: Adult  03/03/2022    Depression Remission PHQ  03/03/2022    Cervical Cancer Screening  10/17/2022    DTaP,Tdap,and Td Vaccines (2 - Td) 09/05/2024    Colorectal Cancer Screening  05/14/2030    Pneumococcal Vaccine: Pediatrics (0 to 5 Years) and At-Risk Patients (6 to 59 Years)  Completed    HIB Vaccine  Aged Out    Hepatitis B Vaccine  Aged Out    IPV Vaccine  Aged Out    Hepatitis A Vaccine  Aged Out    Meningococcal ACWY Vaccine  Aged Out    HPV Vaccine  Aged [employer] History   Administered Date(s) Administered    INFLUENZA 11/11/2016, 10/17/2017, 10/31/2018    Influenza Quadrivalent 3 years and older 11/11/2016, 10/17/2017    Influenza, injectable, quadrivalent, preservative free 0 5 mL 09/24/2019    Influenza, recombinant, quadrivalent,injectable, preservative free 10/31/2018    Pneumococcal Polysaccharide PPV23 10/31/2018    Tdap 09/05/2014          Val Rich DO

## 2021-03-04 ENCOUNTER — TELEPHONE (OUTPATIENT)
Dept: PAIN MEDICINE | Facility: CLINIC | Age: 51
End: 2021-03-04

## 2021-03-04 ENCOUNTER — PATIENT OUTREACH (OUTPATIENT)
Dept: FAMILY MEDICINE CLINIC | Facility: CLINIC | Age: 51
End: 2021-03-04

## 2021-03-04 DIAGNOSIS — Z78.9 NEED FOR FOLLOW-UP BY SOCIAL WORKER: Primary | ICD-10-CM

## 2021-03-04 RX ORDER — TRAMADOL HYDROCHLORIDE 50 MG/1
TABLET ORAL
Qty: 60 TABLET | Refills: 0 | Status: SHIPPED | OUTPATIENT
Start: 2021-03-04 | End: 2021-03-09

## 2021-03-04 NOTE — PATIENT INSTRUCTIONS
Check cost of ventolin or proair inhalers - rescue inhalers for COPD  Call if you want rx sent in    Referral to complex care management to discuss insurance issues  Referral to pain management    Recommend returning to GI given ongoing nausea  May need another EGD or workup for gastroparesis  Fasting labs  Patient to call for results if he/she does not hear from us    Ultimately - you will need pap, mammogram  Colonoscopy    Recommend flu shot, covid vaccine, shingrix vaccine    Try dove hypoallergenic soap  Cocoa butter - queen meek    Begin weaning off zofran by taking every other day for a week then stop  Do not start ultram until after you are completely off the zofran  And begin with ultram 50 mg at bedtime as needed for pain  Then increase to 50 mg up to 2x/day as needed for pain  And followup with pain management for ongoing med management    Caution for sedation with ultram    Discuss with psychiatrist - not taking an SSRI or SNRI with ultram  Due to concern over serotonin syndrome    Recommend gyn for ovarian cyst and for routine pap

## 2021-03-05 ENCOUNTER — HOSPITAL ENCOUNTER (EMERGENCY)
Facility: HOSPITAL | Age: 51
Discharge: HOME | End: 2021-03-05
Attending: EMERGENCY MEDICINE | Admitting: EMERGENCY MEDICINE

## 2021-03-05 VITALS
OXYGEN SATURATION: 99 % | TEMPERATURE: 98 F | BODY MASS INDEX: 22.45 KG/M2 | RESPIRATION RATE: 18 BRPM | WEIGHT: 139.11 LBS | DIASTOLIC BLOOD PRESSURE: 89 MMHG | SYSTOLIC BLOOD PRESSURE: 149 MMHG | HEART RATE: 89 BPM

## 2021-03-05 DIAGNOSIS — R52 PAIN: ICD-10-CM

## 2021-03-05 DIAGNOSIS — F32.A DEPRESSION: ICD-10-CM

## 2021-03-05 DIAGNOSIS — L29.9 PRURITUS: Primary | ICD-10-CM

## 2021-03-05 LAB
ALBUMIN SERPL BCP-MCNC: 4 G/DL (ref 3.5–5)
ALP SERPL-CCNC: 64 U/L (ref 46–116)
ALT SERPL W P-5'-P-CCNC: 29 U/L (ref 12–78)
ANION GAP SERPL CALCULATED.3IONS-SCNC: 13 MMOL/L (ref 4–13)
AST SERPL W P-5'-P-CCNC: 22 U/L (ref 5–45)
BASOPHILS # BLD AUTO: 0.08 THOUSANDS/ΜL (ref 0–0.1)
BASOPHILS NFR BLD AUTO: 1 % (ref 0–1)
BILIRUB SERPL-MCNC: 0.29 MG/DL (ref 0.2–1)
BUN SERPL-MCNC: 7 MG/DL (ref 5–25)
CALCIUM SERPL-MCNC: 10.1 MG/DL (ref 8.3–10.1)
CHLORIDE SERPL-SCNC: 102 MMOL/L (ref 100–108)
CO2 SERPL-SCNC: 25 MMOL/L (ref 21–32)
CREAT SERPL-MCNC: 0.86 MG/DL (ref 0.6–1.3)
EOSINOPHIL # BLD AUTO: 0.13 THOUSAND/ΜL (ref 0–0.61)
EOSINOPHIL NFR BLD AUTO: 2 % (ref 0–6)
ERYTHROCYTE [DISTWIDTH] IN BLOOD BY AUTOMATED COUNT: 13.3 % (ref 11.6–15.1)
GFR SERPL CREATININE-BSD FRML MDRD: 78 ML/MIN/1.73SQ M
GLUCOSE SERPL-MCNC: 126 MG/DL (ref 65–140)
HCT VFR BLD AUTO: 40.5 % (ref 34.8–46.1)
HGB BLD-MCNC: 13.2 G/DL (ref 11.5–15.4)
IMM GRANULOCYTES # BLD AUTO: 0.02 THOUSAND/UL (ref 0–0.2)
IMM GRANULOCYTES NFR BLD AUTO: 0 % (ref 0–2)
LYMPHOCYTES # BLD AUTO: 2.08 THOUSANDS/ΜL (ref 0.6–4.47)
LYMPHOCYTES NFR BLD AUTO: 29 % (ref 14–44)
MCH RBC QN AUTO: 32.8 PG (ref 26.8–34.3)
MCHC RBC AUTO-ENTMCNC: 32.6 G/DL (ref 31.4–37.4)
MCV RBC AUTO: 101 FL (ref 82–98)
MONOCYTES # BLD AUTO: 0.36 THOUSAND/ΜL (ref 0.17–1.22)
MONOCYTES NFR BLD AUTO: 5 % (ref 4–12)
NEUTROPHILS # BLD AUTO: 4.6 THOUSANDS/ΜL (ref 1.85–7.62)
NEUTS SEG NFR BLD AUTO: 63 % (ref 43–75)
NRBC BLD AUTO-RTO: 0 /100 WBCS
PLATELET # BLD AUTO: 320 THOUSANDS/UL (ref 149–390)
PMV BLD AUTO: 9.6 FL (ref 8.9–12.7)
POTASSIUM SERPL-SCNC: 3.9 MMOL/L (ref 3.5–5.3)
PROT SERPL-MCNC: 7.6 G/DL (ref 6.4–8.2)
RBC # BLD AUTO: 4.02 MILLION/UL (ref 3.81–5.12)
SODIUM SERPL-SCNC: 140 MMOL/L (ref 136–145)
WBC # BLD AUTO: 7.27 THOUSAND/UL (ref 4.31–10.16)

## 2021-03-05 PROCEDURE — 80053 COMPREHEN METABOLIC PANEL: CPT | Performed by: EMERGENCY MEDICINE

## 2021-03-05 PROCEDURE — 99284 EMERGENCY DEPT VISIT MOD MDM: CPT | Performed by: EMERGENCY MEDICINE

## 2021-03-05 PROCEDURE — 99283 EMERGENCY DEPT VISIT LOW MDM: CPT

## 2021-03-05 PROCEDURE — 36415 COLL VENOUS BLD VENIPUNCTURE: CPT | Performed by: EMERGENCY MEDICINE

## 2021-03-05 PROCEDURE — 85025 COMPLETE CBC W/AUTO DIFF WBC: CPT | Performed by: EMERGENCY MEDICINE

## 2021-03-05 RX ORDER — HYDROXYZINE HYDROCHLORIDE 25 MG/1
25 TABLET, FILM COATED ORAL EVERY 6 HOURS
Qty: 28 TABLET | Refills: 0 | Status: SHIPPED | OUTPATIENT
Start: 2021-03-05 | End: 2021-03-09

## 2021-03-05 RX ORDER — HYDROXYZINE HYDROCHLORIDE 25 MG/1
25 TABLET, FILM COATED ORAL ONCE
Status: COMPLETED | OUTPATIENT
Start: 2021-03-05 | End: 2021-03-05

## 2021-03-05 RX ADMIN — HYDROXYZINE HYDROCHLORIDE 25 MG: 25 TABLET ORAL at 09:08

## 2021-03-05 NOTE — ED PROVIDER NOTES
History  Chief Complaint   Patient presents with    Foot Swelling     pt c/o bilateral feet and hand swelling for the past x2 weeks  pt states it is painful to walk since then  pt deneis cp/sob but has some " loose bowels and nausea  pt denies vomiting  pt also c/o right shoulder pain that has been chronic but worsened in the past x10 days  43 y/o F presents for evaluation of diffuse urticaria since October  She states its constant, getting progressively worse, and does not improve with otc medications  Pt states that for the past several months her hands and feet have felt raw and swollen  She states they are not currently swollen now  She denies n/v, f/c, cp, sob, cough, uri symptoms  Pt was seen by her pcp and referred to complex care and pain management  Pt presents today 2/2 persistent symptoms      History provided by:  Patient      Prior to Admission Medications   Prescriptions Last Dose Informant Patient Reported? Taking? ARIPiprazole (ABILIFY) 2 mg tablet   No No   Sig: Take 1 tablet (2 mg total) by mouth daily   Patient not taking: Reported on 3/3/2021   al mag oxide-diphenhydramine-lidocaine viscous (MAGIC MOUTHWASH) 1:1:1 suspension  Self No No   Sig: Swish and spit 10 mL every 4 (four) hours as needed for mouth pain or discomfort   Patient not taking: Reported on 3/3/2021   budesonide-formoterol (Symbicort) 160-4 5 mcg/act inhaler  Self No No   Sig: Inhale 2 puffs 2 (two) times a day Rinse mouth after use     Patient not taking: Reported on 3/3/2021   butalbital-acetaminophen-caffeine (FIORICET,ESGIC) -40 mg per tablet  Self No No   Sig: Take 1 tablet by mouth daily as needed for headaches   Patient not taking: Reported on 3/3/2021   eszopiclone (LUNESTA) 3 MG tablet   No No   Sig: Take 1 tablet (3 mg total) by mouth daily at bedtime as needed for sleep Take immediately before bedtime   Patient not taking: Reported on 3/3/2021   eszopiclone (LUNESTA) 3 MG tablet   No No   Sig: Take 1 tablet (3 mg total) by mouth daily at bedtime as needed for sleep Take immediately before bedtime   Patient not taking: Reported on 3/3/2021   famotidine (PEPCID) 20 mg tablet   No No   Sig: Take 1 tablet (20 mg total) by mouth daily   Patient not taking: Reported on 3/3/2021   fluticasone (FLONASE) 50 mcg/act nasal spray  Self No No   Si spray into each nostril daily   Patient not taking: Reported on 3/3/2021   lisinopril (ZESTRIL) 20 mg tablet   No No   Sig: Take 1 tablet (20 mg total) by mouth daily   Patient not taking: Reported on 3/3/2021   mirtazapine (REMERON) 15 mg tablet  Self No No   Sig: Take 1 tablet (15 mg total) by mouth daily at bedtime   Patient not taking: Reported on 3/3/2021   nicotine (NICODERM CQ) 21 mg/24 hr TD 24 hr patch  Self No No   Sig: APPLY 1 [address] DAY   Patient not taking: Reported on 10/8/2020   omeprazole (PriLOSEC) 40 MG capsule 3/5/2021 at Unknown time  No Yes   Sig: Take 1 capsule (40 mg total) by mouth daily   ondansetron (ZOFRAN) 4 mg tablet 3/5/2021 at Unknown time  No Yes   Sig: Take 1 tablet (4 mg total) by mouth daily as needed for nausea or vomiting   oxyCODONE-acetaminophen (PERCOCET) 5-325 mg per tablet   No No   Sig: Take 1 tablet by mouth every 6 (six) hours as needed for moderate painMax Daily Amount: 4 tablets   Patient not taking: Reported on 3/3/2021   polyethylene glycol (GLYCOLAX) powder  Self No No   Sig: Take 17 g by mouth 2 (two) times a day   Patient not taking: Reported on 3/3/2021   psyllium (METAMUCIL) packet  Self No No   Sig: Take 1 packet by mouth daily   Patient not taking: Reported on 3/3/2021   traMADol (ULTRAM) 50 mg tablet  Self No No   Sig: Take 2 tablets (100 mg total) by mouth daily as needed for moderate pain Managed by pain specialist   Patient not taking: Reported on 3/3/2021   traMADol (ULTRAM) 50 mg tablet   No No   Sig: Take 1 tab po qd - bid prn pain; caution for sedation; avoid driving/working while taking; do NOT take with zofran or SSRI/SNRI   triamcinolone (KENALOG) 0 1 % oral topical paste  Self No No   Sig: Apply after meals and at bedtime x 14 days   Patient not taking: Reported on 3/3/2021   venlafaxine (EFFEXOR-XR) 150 mg 24 hr capsule  Self No No   Sig: Take 1 capsule (150 mg total) by mouth daily   Patient not taking: Reported on 3/3/2021   venlafaxine (EFFEXOR-XR) 75 mg 24 hr capsule  Self No No   Sig: Take 1 capsule (75 mg total) by mouth daily   Patient not taking: Reported on 3/3/2021      Facility-Administered Medications: None       Past Medical History:   Diagnosis Date    Chronic pain disorder     Depression     GERD (gastroesophageal reflux disease)     History of electroconvulsive therapy     Low back pain     Self-injurious behavior     Suicide attempt [facility])        Past Surgical History:   Procedure Laterality Date     SECTION      COLONOSCOPY      PANCREAS SURGERY      "pseudocysts" per patient's  Ricki Parksdiaz    [place] ESOPHAGOGASTRODUODENOSCOPY TRANSORAL DIAGNOSTIC N/A 4/10/2018    Procedure: EGD AND COLONOSCOPY;  Surgeon: Luigi Lao MD;  [place] [facility]; Service: Gastroenterology       Family History   Problem Relation Age of Onset    Arthritis Mother     Coronary artery disease Mother     Colon cancer Family     Parkinsonism Father     Parkinsonism Paternal Grandmother     Coronary artery disease Paternal Grandfather     Depression Neg Hx      I have reviewed and agree with the history as documented      E-Cigarette/Vaping    E-Cigarette Use Never User      E-Cigarette/Vaping Substances    Nicotine No     THC No     CBD No     Flavoring No     Other No     Unknown No      Social History     Tobacco Use    Smoking status: Current Every Day Smoker     Packs/day: 1 00     Years: 35 00     Pack years: 35 00     Types: Cigarettes    Smokeless tobacco: Never Used   Substance Use Topics    Alcohol use: Yes     Frequency: Monthly or less     Comment: vodka and beer, has decreased to occasionally    Drug use: Yes     Types: Marijuana     Comment: medical       Review of Systems   Constitutional: Negative for activity change, appetite change, fatigue and fever  HENT: Negative for congestion, dental problem, ear pain, rhinorrhea and sore throat  Eyes: Negative for pain and redness  Respiratory: Negative for chest tightness, shortness of breath and wheezing  Cardiovascular: Negative for chest pain and palpitations  Gastrointestinal: Negative for abdominal pain, blood in stool, constipation, diarrhea, nausea and vomiting  Endocrine: Negative for cold intolerance and heat intolerance  Genitourinary: Negative for dysuria, frequency and hematuria  Musculoskeletal: Negative for arthralgias and myalgias  Skin: Negative for color change, pallor and rash  Neurological: Negative for weakness and numbness  Hematological: Does not bruise/bleed easily  Psychiatric/Behavioral: Negative for agitation, hallucinations and suicidal ideas  Physical Exam  Physical Exam  Constitutional:       Appearance: She is well-developed  HENT:      Head: Normocephalic and atraumatic  Eyes:      Pupils: Pupils are equal, round, and reactive to light  Neck:      Vascular: No JVD  Trachea: No tracheal deviation  Cardiovascular:      Rate and Rhythm: Normal rate and regular rhythm  Pulmonary:      Effort: No tachypnea, accessory muscle usage or respiratory distress  Abdominal:      General: There is no distension  Musculoskeletal:      Right lower leg: Normal       Left lower leg: Normal       Comments: B/l foot/hand exams-nttp, from of all joints, no swelling, no warmth, nttp, nvi  Skin:     General: Skin is warm and dry  Capillary Refill: Capillary refill takes less than 2 seconds  Coloration: Skin is not pale  Findings: No bruising, erythema, lesion or rash  Neurological:      Mental Status: She is alert and oriented to person, place, and time  Psychiatric:         Behavior: Behavior normal          Vital Signs  ED Triage Vitals [03/05/21 0811]   Temperature Pulse Respirations Blood Pressure SpO2   98 °F (36 7 °C) 99 18 (!) 166/113 99 %      Temp Source Heart Rate Source Patient Position - Orthostatic VS BP Location FiO2 (%)   Oral Monitor Sitting Right arm --      Pain Score       5           Vitals:    03/05/21 0811 03/05/21 1013   BP: (!) 166/113 149/89   Pulse: 99 89   Patient Position - Orthostatic VS: Sitting Sitting         Visual Acuity      ED Medications  Medications   hydrOXYzine HCL (ATARAX) tablet 25 mg (25 mg Oral Given 3/5/21 0908)       Diagnostic Studies  Results Reviewed     Procedure Component Value Units Date/Time    Comprehensive metabolic panel [[id number]] Collected: 03/05/21 0915    Lab Status: Final result Specimen: Blood from Arm, Right Updated: 03/05/21 0955     Sodium 140 mmol/L      Potassium 3 9 mmol/L      Chloride 102 mmol/L      CO2 25 mmol/L      ANION GAP 13 mmol/L      BUN 7 mg/dL      Creatinine 0 86 mg/dL      Glucose 126 mg/dL      Calcium 10 1 mg/dL      AST 22 U/L      ALT 29 U/L      Alkaline Phosphatase 64 U/L      Total Protein 7 6 g/dL      Albumin 4 0 g/dL      Total Bilirubin 0 29 mg/dL      eGFR 78 ml/min/1 73sq m     Narrative:      [place] guidelines for Chronic Kidney Disease (CKD):     Stage 1 with normal or high GFR (GFR > 90 mL/min/1 73 square meters)    Stage 2 Mild CKD (GFR = 60-89 mL/min/1 73 square meters)    Stage 3A Moderate CKD (GFR = 45-59 mL/min/1 73 square meters)    Stage 3B Moderate CKD (GFR = 30-44 mL/min/1 73 square meters)    Stage 4 Severe CKD (GFR = 15-29 mL/min/1 73 square meters)    Stage 5 End Stage CKD (GFR <15 mL/min/1 73 square meters)  Note: GFR calculation is accurate only with a steady state creatinine    CBC and differential [[id number]]  (Abnormal) Collected: 03/05/21 0915    Lab Status: Final result Specimen: Blood from Arm, Right Updated: 03/05/21 0920     WBC 7 27 Thousand/uL      RBC 4 02 Million/uL      Hemoglobin 13 2 g/dL      Hematocrit 40 5 %       fL      MCH 32 8 pg      MCHC 32 6 g/dL      RDW 13 3 %      MPV 9 6 fL      Platelets 377 Thousands/uL      nRBC 0 /100 WBCs      Neutrophils Relative 63 %      Immat GRANS % 0 %      Lymphocytes Relative 29 %      Monocytes Relative 5 %      Eosinophils Relative 2 %      Basophils Relative 1 %      Neutrophils Absolute 4 60 Thousands/µL      Immature Grans Absolute 0 02 Thousand/uL      Lymphocytes Absolute 2 08 Thousands/µL      Monocytes Absolute 0 36 Thousand/µL      Eosinophils Absolute 0 13 Thousand/µL      Basophils Absolute 0 08 Thousands/µL                  No orders to display              Procedures  Procedures         ED Course                                           MDM  Number of Diagnoses or Management Options  Depression:   Pain:   Pruritus:   Diagnosis management comments: Multiple complaints with benign exam-will tx symptoms, check labs      Disposition  Final diagnoses:   Pruritus   Pain - bilaterl hands and feet   Depression     Time reflects when diagnosis was documented in both MDM as applicable and the Disposition within this note     Time User Action Codes Description Comment    3/5/2021 10:01 AM Azeem PÉREZ Add [L29 9] Pruritus     3/5/2021 10:01 AM Azeem PÉREZ Add [R52] Pain     3/5/2021 10:01 AM Emre Hahn Modify [R52] Pain bilaterl hands and feet    3/5/2021 10:02 AM Emrerancho Hahn Add [F32 9] Depression       ED Disposition     ED Disposition Condition Date/Time Comment    Discharge Stable Fri Mar 5, 2021 10:01 AM Vianey June discharge to home/self care              Follow-up Information     Follow up With Specialties Details Why Contact Info    Chante Agustin DO Family Medicine Schedule an appointment as soon as possible for a visit in 2 days  [address]    [address]  [address]            Discharge Medication List as of 3/5/2021 10:02 AM      START taking these medications    Details   hydrOXYzine HCL (ATARAX) 25 mg tablet Take 1 tablet (25 mg total) by mouth every 6 (six) hours for 7 days, Starting Fri 3/5/2021, Until Fri 3/12/2021, Normal         CONTINUE these medications which have NOT CHANGED    Details   al mag oxide-diphenhydramine-lidocaine viscous (MAGIC MOUTHWASH) 1:1:1 suspension Swish and spit 10 mL every 4 (four) hours as needed for mouth pain or discomfort, Starting Tue 9/22/2020, Normal      ARIPiprazole (ABILIFY) 2 mg tablet Take 1 tablet (2 mg total) by mouth daily, Starting Tue 12/22/2020, Normal      budesonide-formoterol (Symbicort) 160-4 5 mcg/act inhaler Inhale 2 puffs 2 (two) times a day Rinse mouth after use , Starting Tue 6/16/2020, Normal      butalbital-acetaminophen-caffeine (FIORICET,ESGIC) -40 mg per tablet Take 1 tablet by mouth daily as needed for headaches, Starting Fri 3/27/2020, Normal      !! eszopiclone (LUNESTA) 3 MG tablet Take 1 tablet (3 mg total) by mouth daily at bedtime as needed for sleep Take immediately before bedtime, Starting Tue 12/22/2020, Normal      !! eszopiclone (LUNESTA) 3 MG tablet Take 1 tablet (3 mg total) by mouth daily at bedtime as needed for sleep Take immediately before bedtime, Starting Fri 1/29/2021, Normal      famotidine (PEPCID) 20 mg tablet Take 1 tablet (20 mg total) by mouth daily, Starting Mon 1/11/2021, Normal      fluticasone (FLONASE) 50 mcg/act nasal spray 1 spray into each nostril daily, Starting Tue 9/24/2019, Normal      lisinopril (ZESTRIL) 20 mg tablet Take 1 tablet (20 mg total) by mouth daily, Starting Tue 10/20/2020, Normal      mirtazapine (REMERON) 15 mg tablet Take 1 tablet (15 mg total) by mouth daily at bedtime, Starting Fri 8/14/2020, Normal      nicotine (NICODERM CQ) 21 mg/24 hr TD 24 hr patch APPLY 1 PATCH EVERY DAY, Normal      omeprazole (PriLOSEC) 40 MG capsule Take 1 capsule (40 mg total) by mouth daily, Starting Tue 1/26/2021, Normal      ondansetron (ZOFRAN) 4 mg tablet Take 1 tablet (4 mg total) by mouth daily as needed for nausea or vomiting, Starting u 12/10/2020, Normal      oxyCODONE-acetaminophen (PERCOCET) 5-325 mg per tablet Take 1 tablet by mouth every 6 (six) hours as needed for moderate painMax Daily Amount: 4 tablets, Starting Thu 10/8/2020, Normal      polyethylene glycol (GLYCOLAX) powder Take 17 g by mouth 2 (two) times a day, Starting Fri 8/2/2019, Normal      psyllium (METAMUCIL) packet Take 1 packet by mouth daily, Starting Fri 5/15/2020, Print      !! traMADol (ULTRAM) 50 mg tablet Take 2 tablets (100 mg total) by mouth daily as needed for moderate pain Managed by pain specialist, Starting Fri 8/14/2020, No Print      !! traMADol (ULTRAM) 50 mg tablet Take 1 tab po qd - bid prn pain; caution for sedation; avoid driving/working while taking; do NOT take with zofran or SSRI/SNRI, Normal      triamcinolone (KENALOG) 0 1 % oral topical paste Apply after meals and at bedtime x 14 days, Normal      !! venlafaxine (EFFEXOR-XR) 150 mg 24 hr capsule Take 1 capsule (150 mg total) by mouth daily, Starting Fri 8/14/2020, Normal      !! venlafaxine (EFFEXOR-XR) 75 mg 24 hr capsule Take 1 capsule (75 mg total) by mouth daily, Starting Fri 8/14/2020, Normal       !! - Potential duplicate medications found  Please discuss with provider  No discharge procedures on file      PDMP Review       Value Time User    PDMP Reviewed  Yes 11/17/2020 10:17 AM Haritha Marcus DO          ED Provider  Electronically Signed by           Lorena Randle MD  03/05/21 9380

## 2021-03-08 ENCOUNTER — PATIENT OUTREACH (OUTPATIENT)
Dept: FAMILY MEDICINE CLINIC | Facility: CLINIC | Age: 51
End: 2021-03-08

## 2021-03-09 ENCOUNTER — OFFICE VISIT (OUTPATIENT)
Dept: PSYCHIATRY | Facility: CLINIC | Age: 51
End: 2021-03-09
Payer: COMMERCIAL

## 2021-03-09 DIAGNOSIS — M51.37 DDD (DEGENERATIVE DISC DISEASE), LUMBOSACRAL: ICD-10-CM

## 2021-03-09 DIAGNOSIS — L29.9 PRURITUS: ICD-10-CM

## 2021-03-09 DIAGNOSIS — M25.511 CHRONIC RIGHT SHOULDER PAIN: ICD-10-CM

## 2021-03-09 DIAGNOSIS — Z87.81 HISTORY OF RIB FRACTURE: ICD-10-CM

## 2021-03-09 DIAGNOSIS — G89.29 CHRONIC RIGHT SHOULDER PAIN: ICD-10-CM

## 2021-03-09 DIAGNOSIS — G58.8 INTERCOSTAL NEURALGIA: ICD-10-CM

## 2021-03-09 PROCEDURE — 99214 OFFICE O/P EST MOD 30 MIN: CPT | Performed by: NURSE PRACTITIONER

## 2021-03-09 RX ORDER — HYDROXYZINE 50 MG/1
TABLET, FILM COATED ORAL
Qty: 60 TABLET | Refills: 0 | Status: SHIPPED | OUTPATIENT
Start: 2021-03-09 | End: 2021-04-15

## 2021-03-09 RX ORDER — TRAMADOL HYDROCHLORIDE 50 MG/1
TABLET ORAL
Qty: 60 TABLET | Refills: 0
Start: 2021-03-09 | End: 2021-04-14 | Stop reason: SDUPTHER

## 2021-03-09 NOTE — PSYCH
Virtual Regular Visit    Problem List Items Addressed This Visit        Musculoskeletal and Integument    DDD (degenerative disc disease), lumbosacral       Other    History of rib fracture    Intercostal neuralgia    Chronic right shoulder pain      Other Visit Diagnoses     Pruritus                 Encounter provider CARLITOS Kellogg    Provider located at   [address]  [facility]    [address] [place] [address] [phone]    Recent Visits  Date Type Provider Dept   03/03/21 Office Visit Richmond Sheets, [address] [facility]   Showing recent visits within past 7 days and meeting all other requirements     Today's Visits  Date Type Provider Dept   03/09/21 Office Visit CARLITOS [facility] [address]   Showing today's visits and meeting all other requirements     Future Appointments  No visits were found meeting these conditions  Showing future appointments within next 150 days and meeting all other requirements      The patient was identified by name and date of birth  Belkis Harrell was informed that this is a telemedicine visit and that the visit is being conducted through [employer]  My office door was closed  No one else was in the room  She acknowledged consent and understanding of privacy and security of the video platform  The patient has agreed to participate and understands they can discontinue the visit at any time  Patient is aware this is a billable service  HPI     Current Outpatient Medications   Medication Sig Dispense Refill    al mag oxide-diphenhydramine-lidocaine viscous (MAGIC MOUTHWASH) 1:1:1 suspension Swish and spit 10 mL every 4 (four) hours as needed for mouth pain or discomfort (Patient not taking: Reported on 3/3/2021) 500 mL 1    budesonide-formoterol (Symbicort) 160-4 5 mcg/act inhaler Inhale 2 puffs 2 (two) times a day Rinse mouth after use   (Patient not taking: Reported on 3/3/2021) 10 2 Inhaler 3    butalbital-acetaminophen-caffeine (FIORICET,ESGIC) -40 mg per tablet Take 1 tablet by mouth daily as needed for headaches (Patient not taking: Reported on 3/3/2021) 90 tablet 0    famotidine (PEPCID) 20 mg tablet Take 1 tablet (20 mg total) by mouth daily (Patient not taking: Reported on 3/3/2021) 30 tablet 3    fluticasone (FLONASE) 50 mcg/act nasal spray 1 spray into each nostril daily (Patient not taking: Reported on 3/3/2021) 1 Bottle 1    hydrOXYzine HCL (ATARAX) 25 mg tablet Take 1 tablet (25 mg total) by mouth every 6 (six) hours for 7 days 28 tablet 0    lisinopril (ZESTRIL) 20 mg tablet Take 1 tablet (20 mg total) by mouth daily (Patient not taking: Reported on 3/3/2021) 90 tablet 0    nicotine (NICODERM CQ) 21 mg/24 hr TD 24 hr patch APPLY 1 PATCH EVERY DAY (Patient not taking: Reported on 10/8/2020) 28 patch 0    omeprazole (PriLOSEC) 40 MG capsule Take 1 capsule (40 mg total) by mouth daily 90 capsule 0    ondansetron (ZOFRAN) 4 mg tablet Take 1 tablet (4 mg total) by mouth daily as needed for nausea or vomiting 90 tablet 1    polyethylene glycol (GLYCOLAX) powder Take 17 g by mouth 2 (two) times a day (Patient not taking: Reported on 3/3/2021) 850 g 6    psyllium (METAMUCIL) packet Take 1 packet by mouth daily (Patient not taking: Reported on 3/3/2021) 30 packet 0    triamcinolone (KENALOG) 0 1 % oral topical paste Apply after meals and at bedtime x 14 days (Patient not taking: Reported on 3/3/2021) 5 g 1     No current facility-administered medications for this visit  Review of Systems  Video Exam    There were no vitals filed for this visit  Physical Exam   As a result of this visit, I have referred the patient for further respiratory evaluation  No    I spent 15 minutes directly with the patient during this visit  VIRTUAL VISIT DISCLAIMER    Daniel Arik acknowledges that she has consented to an online visit or consultation   She understands that the online visit is based solely on information provided by her, and that, in the absence of a face-to-face physical evaluation by the physician, the diagnosis she receives is both limited and provisional in terms of accuracy and completeness  This is not intended to replace a full medical face-to-face evaluation by the physician  Nimco Hilton understands and accepts these terms  MEDICATION MANAGEMENT NOTE        [facility]    Name and Date of Birth:  Nimco Hilton 44 y o  1970 MRN: [id number]    Date of Visit: March 9, 2021    Allergies   Allergen Reactions    Chantix [Varenicline]     Ibuprofen Other (See Comments)    Lyrica [Pregabalin] Other (See Comments)     bruising    Penicillins Other (See Comments)     ? hives    Sulfa Antibiotics Other (See Comments)     sloughing skin in mouth    Sulfasalazine      SUBJECTIVE:    Jonnie Maurer is seen today for a follow up for Major Depressive Disorder, Generalized Anxiety Disorder and insomnia  She reports that she has improved slightly since the last visit  The patient presents  After discontinuing all of her medical and psychiatric medications are practice approximately 1 month ago  Patient states she was frustrated with changes in her doctor's and limitations set out by pain management in combination with her psychiatric medications  States that primary care would not prescribe tramadol in combination with venlafaxine or any serotonergic medication and that this was primary catalyst for discontinuing all of her medications out of frustration  Currently the patient is taking only hydroxyzine, omeprazole and tramadol  Patient has been experiencing a new medical possibly psychosomatic symptom  Patient has uticaria and  Upper and lower extremity edema primarily in her feet and hands    Symptoms worsen at night patient states that itching is intolerable when she is trying to sleep she also experiences pain from  Extremities which swell more at bedtime  Currently states that she is having difficulty getting a full workup for this condition as she does not have medical insurance to pay for tests or blood work  However, overall she states she is feeling better, less depression and less anxiety  However, the patient does state that per her usual baseline she feels very angry "it's out of control "  Patient has upcoming visit with individual therapist next week and plans to continue with therapy if it is reasonably affordable  Patient is happy to be on less medications  And states that although she knows stopping all of her medications was potentially dangerous she overall feels better having stopped everything  Encouraged patient to speak with primary care and continue to monitor for hypertension following discontinuation of lisinopril  Patient would like to work with individual therapy on her anger prior to considering any new medications  Is in agreement with increase in hydroxyzine  She denies any side effects from medications      PLAN:    Increase hydroxyzine to 25 mg 8 hours apart during the day and 50 mg at bedtime to address insomnia due to excessive itching and discomfort at bedtime  Aware of 24 hour and weekend coverage for urgent situations accessed by calling [facility] main practice number    Diagnoses and all orders for this visit:    DDD (degenerative disc disease), lumbosacral    History of rib fracture    Chronic right shoulder pain    Intercostal neuralgia    Pruritus        Current Outpatient Medications on File Prior to Visit   Medication Sig Dispense Refill    al mag oxide-diphenhydramine-lidocaine viscous (MAGIC MOUTHWASH) 1:1:1 suspension Swish and spit 10 mL every 4 (four) hours as needed for mouth pain or discomfort (Patient not taking: Reported on 3/3/2021) 500 mL 1    budesonide-formoterol (Symbicort) 160-4 5 mcg/act inhaler Inhale 2 puffs 2 (two) times a day Rinse mouth after use   (Patient not taking: Reported on 3/3/2021) 10 2 Inhaler 3    butalbital-acetaminophen-caffeine (FIORICET,ESGIC) -40 mg per tablet Take 1 tablet by mouth daily as needed for headaches (Patient not taking: Reported on 3/3/2021) 90 tablet 0    famotidine (PEPCID) 20 mg tablet Take 1 tablet (20 mg total) by mouth daily (Patient not taking: Reported on 3/3/2021) 30 tablet 3    fluticasone (FLONASE) 50 mcg/act nasal spray 1 spray into each nostril daily (Patient not taking: Reported on 3/3/2021) 1 Bottle 1    hydrOXYzine HCL (ATARAX) 25 mg tablet Take 1 tablet (25 mg total) by mouth every 6 (six) hours for 7 days 28 tablet 0    lisinopril (ZESTRIL) 20 mg tablet Take 1 tablet (20 mg total) by mouth daily (Patient not taking: Reported on 3/3/2021) 90 tablet 0    nicotine (NICODERM CQ) 21 mg/24 hr TD 24 hr patch APPLY 1 PATCH EVERY DAY (Patient not taking: Reported on 10/8/2020) 28 patch 0    omeprazole (PriLOSEC) 40 MG capsule Take 1 capsule (40 mg total) by mouth daily 90 capsule 0    ondansetron (ZOFRAN) 4 mg tablet Take 1 tablet (4 mg total) by mouth daily as needed for nausea or vomiting 90 tablet 1    polyethylene glycol (GLYCOLAX) powder Take 17 g by mouth 2 (two) times a day (Patient not taking: Reported on 3/3/2021) 850 g 6    psyllium (METAMUCIL) packet Take 1 packet by mouth daily (Patient not taking: Reported on 3/3/2021) 30 packet 0    triamcinolone (KENALOG) 0 1 % oral topical paste Apply after meals and at bedtime x 14 days (Patient not taking: Reported on 3/3/2021) 5 g 1    [DISCONTINUED] ARIPiprazole (ABILIFY) 2 mg tablet Take 1 tablet (2 mg total) by mouth daily (Patient not taking: Reported on 3/3/2021) 30 tablet 2    [DISCONTINUED] eszopiclone (LUNESTA) 3 MG tablet Take 1 tablet (3 mg total) by mouth daily at bedtime as needed for sleep Take immediately before bedtime (Patient not taking: Reported on 3/3/2021) 10 tablet 0    [DISCONTINUED] eszopiclone (LUNESTA) 3 MG tablet Take 1 tablet (3 mg total) by mouth daily at bedtime as needed for sleep Take immediately before bedtime (Patient not taking: Reported on 3/3/2021) 30 tablet 2    [DISCONTINUED] mirtazapine (REMERON) 15 mg tablet Take 1 tablet (15 mg total) by mouth daily at bedtime (Patient not taking: Reported on 3/3/2021) 90 tablet 1    [DISCONTINUED] oxyCODONE-acetaminophen (PERCOCET) 5-325 mg per tablet Take 1 tablet by mouth every 6 (six) hours as needed for moderate painMax Daily Amount: 4 tablets (Patient not taking: Reported on 3/3/2021) 10 tablet 0    [DISCONTINUED] traMADol (ULTRAM) 50 mg tablet Take 2 tablets (100 mg total) by mouth daily as needed for moderate pain Managed by pain specialist (Patient not taking: Reported on 3/3/2021) 1 tablet 0    [DISCONTINUED] traMADol (ULTRAM) 50 mg tablet Take 1 tab po qd - bid prn pain; caution for sedation; avoid driving/working while taking; do NOT take with zofran or SSRI/SNRI 60 tablet 0    [DISCONTINUED] venlafaxine (EFFEXOR-XR) 150 mg 24 hr capsule Take 1 capsule (150 mg total) by mouth daily (Patient not taking: Reported on 3/3/2021) 90 capsule 1    [DISCONTINUED] venlafaxine (EFFEXOR-XR) 75 mg 24 hr capsule Take 1 capsule (75 mg total) by mouth daily (Patient not taking: Reported on 3/3/2021) 90 capsule 1     No current facility-administered medications on file prior to visit  Psychotherapy Provided:     Individual psychotherapy provided: no    HPI ROS Appetite Changes and Sleep:     She reports difficulty falling asleep, frequent awakenings, normal appetite, normal energy level   Patient denies suicidal or homicidal ideation    Review Of Systems:      General emotional problems and decreased functioning   Personality no change in personality   Constitutional negative   ENT negative   Cardiovascular negative   Respiratory negative   Gastrointestinal negative   Genitourinary negative   Musculoskeletal negative Integumentary negative   Neurological negative   Endocrine negative   Other Symptoms none, all other systems are negative     Mental Status Evaluation:    Appearance Adequate hygiene and grooming   Behavior cooperative and restless and fidgety   Mood angry  Depression Scale -  of 10 (0 = No depression)  Anxiety Scale -  of 10 (0 = No anxiety)   Speech Normal rate and volume   Affect appropriate and mood-congruent   Thought Processes Goal directed and coherent   Thought Content Does not verbalize delusional material   Associations Tightly connected   Perceptual Disturbances Denies hallucinations and does not appear to be responding to internal stimuli   Risk Potential Suicidal/Homicidal Ideation - No evidence of suicidal or homicidal ideation and Patient does not verbalize suicidal or homicidal ideation  Risk of Violence - No evidence of risk for violence found on assessment  Risk of Self Mutilation - No evidence of risk for self mutilation found on assessment   Orientation oriented to person, place, time/date and situation   Memory recent and remote memory grossly intact   Consciousness alert and awake   Attention/Concentration attention span and concentration are age appropriate   Insight fair   Judgement fair and improving   Muscle Strength and Gait normal muscle strength and normal muscle tone, normal gait/station and normal balance   Motor Activity no abnormal movements   Language no difficulty naming common objects, no difficulty repeating a phrase, no difficulty writing a sentence   Fund of Knowledge adequate knowledge of current events  adequate fund of knowledge regarding past history  adequate fund of knowledge regarding vocabulary      Past Psychiatric History Update:     Inpatient Psychiatric Admission Since Last Encounter:   no  Changes to Outpatient Psychiatric Treatment Team:    no  Suicide Attempt Or Self Mutilation Since Last Encounter:   no  Incidence of Violent Behavior Since Last Encounter: no    Traumatic History Update:     New Onset of Abuse Since Last Encounter:   no  Traumatic Events Since Last Encounter:   no    Past Medical History:    Past Medical History:   Diagnosis Date    Chronic pain disorder     Depression     GERD (gastroesophageal reflux disease)     History of electroconvulsive therapy     Low back pain     Self-injurious behavior     Suicide attempt ([facility] [address] )      No past medical history pertinent negatives  Past Surgical History:   Procedure Laterality Date     SECTION      COLONOSCOPY      PANCREAS SURGERY      "pseudocysts" per patient's  Ricki Infante    [place] ESOPHAGOGASTRODUODENOSCOPY TRANSORAL DIAGNOSTIC N/A 4/10/2018    Procedure: EGD AND COLONOSCOPY;  Surgeon: Roni Guerrero MD;  [facility];   Service: Gastroenterology     Allergies   Allergen Reactions    Chantix [Varenicline]     Ibuprofen Other (See Comments)    Lyrica [Pregabalin] Other (See Comments)     bruising    Penicillins Other (See Comments)     ? hives    Sulfa Antibiotics Other (See Comments)     sloughing skin in mouth    Sulfasalazine      Substance Abuse History:    Social History     Substance and Sexual Activity   Alcohol Use Yes    Frequency: Monthly or less    Comment: vodka and beer, has decreased to occasionally     Social History     Substance and Sexual Activity   Drug Use Yes    Types: Marijuana    Comment: medical     Social History:    Social History     Socioeconomic History    Marital status: /Civil Union     Spouse name: Not on file    Number of children: Not on file    Years of education: Not on file    Highest education level: Not on file   Occupational History    Occupation: disability   Social Needs    Financial resource strain: Not on file    Food insecurity     Worry: Not on file     Inability: Not on file   [employer] needs     Medical: Not on file     Non-medical: Not on file   Tobacco Use    Smoking status: Current Every Day Smoker     Packs/day: 1 00     Years: 35 00     Pack years: 35 00     Types: Cigarettes    Smokeless tobacco: Never Used   Substance and Sexual Activity    Alcohol use: Yes     Frequency: Monthly or less     Comment: vodka and beer, has decreased to occasionally    Drug use: Yes     Types: Marijuana     Comment: medical    Sexual activity: Yes     Partners: Male     Comment: PT is    Lifestyle    Physical activity     Days per week: Not on file     Minutes per session: Not on file    Stress: Not on file   Relationships    Social connections     Talks on phone: Not on file     Gets together: Not on file     Attends Christian service: Not on file     Active member of club or organization: Not on file     Attends meetings of clubs or organizations: Not on file     Relationship status: Not on file    Intimate partner violence     Fear of current or ex partner: Not on file     Emotionally abused: Not on file     Physically abused: Not on file     Forced sexual activity: Not on file   Other Topics Concern    Not on file   Social History Narrative    Lives with spouse     Family Psychiatric History:     Family History   Problem Relation Age of Onset    Arthritis Mother     Coronary artery disease Mother     Colon cancer Family     Parkinsonism Father     Parkinsonism Paternal Grandmother     Coronary artery disease Paternal Grandfather     Depression Neg Hx      History Review: The following portions of the patient's history were reviewed and updated as appropriate: allergies, current medications, past family history, past medical history, past social history, past surgical history and problem list     OBJECTIVE:     Vital signs in last 24 hours: There were no vitals filed for this visit  Laboratory Results: I have personally reviewed all pertinent laboratory/tests results      Suicide/Homicide Risk Assessment:    Risk of Harm to Self:   The following ratings are based on assessment at the time of the interview   Recent Specific Risk Factors include: diagnosis of depression    Risk of Harm to Others:   The following ratings are based on assessment at the time of the interview   Recent Specific Risk Factors include: none  The following interventions are recommended: no intervention changes needed    Medications Risks/Benefits:      Risks, Benefits And Possible Side Effects Of Medications:    Discussed risks and benefits of treatment with patient including Risk of QTC prolongation with doses of hydroxyzine above 100 mg daily or in combination with other QTC prolonging drugs     Controlled Medication Discussion:     Brandie Monk has been filling controlled prescriptions on time as prescribed according to [place] Prescription Drug Monitoring Program    Treatment Plan:    Due for update/Updated:   CARLITOS Beckman 03/09/21    This note was shared with patient

## 2021-03-11 ENCOUNTER — TELEPHONE (OUTPATIENT)
Dept: FAMILY MEDICINE CLINIC | Facility: CLINIC | Age: 51
End: 2021-03-11

## 2021-03-11 ENCOUNTER — PATIENT OUTREACH (OUTPATIENT)
Dept: FAMILY MEDICINE CLINIC | Facility: CLINIC | Age: 51
End: 2021-03-11

## 2021-03-11 DIAGNOSIS — Z78.9 NEEDS ASSISTANCE WITH COMMUNITY RESOURCES: Primary | ICD-10-CM

## 2021-03-11 NOTE — PROGRESS NOTES
SW CM referral from Dr Chin Miguel for patient who is currently uninsured  Spoke with patient who states her  lost is job recently and she is concerned his unemployment income will put her over the limit for MA  Patient is agreeable to working with CHW, Forrest Lanes to review MA requirements and complete application if needed  SW CM will place order for CHW  Patient denies any other psychosocial needs at time of this encounter  SW CM will continue to follow for additional support and/or resources as needed

## 2021-03-11 NOTE — TELEPHONE ENCOUNTER
Patient called and states that the swelling in her hands and feet are continuing and in fact, worse  She said it's all day, and quite painful  She was looking for an OTC diuretic, but was told by various pharmacies that these are not sold any longer  Is there something that can be prescribed to help with this swelling? Please advise       Pharmacy confirmed - [facility] in [place]

## 2021-03-11 NOTE — TELEPHONE ENCOUNTER
I am unsure what is causing pt's swelling - but it is not recommended to take diuretics without appropriate diagnosis to support their use  Diuretics can deplete body of needed electrolytes and can interfere with kidney function  Can also lower BP too much; She had labs done in ED and normal protein and albumin noted so does not appear to be nutritionally related  For now - I would recommend actually increasing water intake to help her body diurese naturally  Avoid salty foods and soda  And exercise hands and feet to help liberate fluid back to heart  Would give this a week or so - and if still not improving - may need to see pt again

## 2021-03-12 ENCOUNTER — PATIENT OUTREACH (OUTPATIENT)
Dept: CASE MANAGEMENT | Facility: HOSPITAL | Age: 51
End: 2021-03-12

## 2021-03-12 NOTE — PROGRESS NOTES
Received referrelal from LOR Wiley to assist pt with MA application  CHW called pt and introduced myself and explained the MA process  Pt was originally denied MA back in October of 2020 because of income  CHW stated to try and apply again  CHW made an appointment with pt on March 17 to complete MA application  not applicable

## 2021-03-17 ENCOUNTER — PATIENT OUTREACH (OUTPATIENT)
Dept: CASE MANAGEMENT | Facility: HOSPITAL | Age: 51
End: 2021-03-17

## 2021-03-17 NOTE — PROGRESS NOTES
CHW made a home visit to pt to assist with completing an MA application  Pt lives in her own home with her   Daughter recently moved out and is currently attending college  CHW reviewed with pt the process of the application and the required documentation that they will need  Pt is very pleasant and cooperative and understanding the process  We completed the application with all the necessary information that is required along with copies of their identifications  CHW stated that once they receive a letter from the [facility] they will have to provide copies of their income from unemployment and bank statements  CHW will follow up in two weeks

## 2021-03-18 NOTE — TELEPHONE ENCOUNTER
called yesterday for message  And he is not on a current signed consent   Sent message via Patti Primrose

## 2021-03-23 ENCOUNTER — PATIENT OUTREACH (OUTPATIENT)
Dept: FAMILY MEDICINE CLINIC | Facility: CLINIC | Age: 51
End: 2021-03-23

## 2021-03-23 ENCOUNTER — PATIENT OUTREACH (OUTPATIENT)
Dept: CASE MANAGEMENT | Facility: HOSPITAL | Age: 51
End: 2021-03-23

## 2021-03-23 NOTE — PROGRESS NOTES
LOR ALEMAN received call from Lobito Lynch, stating that patient disclosed she did not "feel welcomed in her home"  CHW encouraged patient to reach out to LOR ALEMAN and patient did  LOR ALEMAN spent an extensive amount of time discussing family dynamics  Patient lives with  and states she is confined to her top floor in her bedroom  Patient denies physical abuse  Patient denies emotional abuse, but states "Everything is my fault"  Patient states she has been living this way for a few years and wants to leave  Patient states she is safe in the home and can stay there while she makes a plan to leave  Patient is unemployed and has no access to money  Patient is working with CHW to apply for MA and has requested to apply for a [employer] cell phone  LOR ALEMAN will advise CHW to begin this application  Resource for [employer] has also been given to patient for possible legal resources to pursue free/low cost divorce  Patient receives [facility] at [facility]  Patient states she would like to be seen by therapist, but there is no appointment until July and she has no insurance  LOR ALEMAN encouraged patient to wait and see if her insurance is approved and LOR ALEMAN will see if there is an appointment available any sooner  Patient is agreeable  Patient was asked twice more if she feels safe in her home and she stated she did  LOR ALEMAN encouraged patient to call if additional support is needed  LOR ALEMAN to continue to follow

## 2021-03-23 NOTE — PROGRESS NOTES
CHW received call stating that she would like a number for legal services  Pt stated that she "feels that she is not welcomed at the home"  She also stated that she is confined to one room in the upstairs of the home  Pt is looking into divorce  Pt stated she is safe but would like to look into housing and cell phone services because she shares phone with   CHW provided pt with [address] [place] [address] legal services and will apply for cell phone services through [employer]  Also provided phone number for LOR Prieto     Pt did reach out to  and discussed the issues pt is having  Due to sensitive issues this message will not be shared with pt

## 2021-03-24 ENCOUNTER — PATIENT OUTREACH (OUTPATIENT)
Dept: CASE MANAGEMENT | Facility: HOSPITAL | Age: 51
End: 2021-03-24

## 2021-03-24 NOTE — PROGRESS NOTES
Received call from pt stating that the number that she had for [facility] no one has answered  CHW suggested to keep trying and if she does have any success that I will try and locate a different number  Pt stated that she does not need her own cell phone at this time she said that it is not a priority  Pt needed a number for social security to acquire information in regards to how to go about applying for social security  CHW found the number for social security [place] [phone] and gave to pt  Pt stated she will let me know once she has gathered some information

## 2021-03-29 ENCOUNTER — TELEPHONE (OUTPATIENT)
Dept: PSYCHIATRY | Facility: CLINIC | Age: 51
End: 2021-03-29

## 2021-03-29 DIAGNOSIS — F51.01 PRIMARY INSOMNIA: Primary | ICD-10-CM

## 2021-03-30 RX ORDER — ESZOPICLONE 1 MG/1
1 TABLET, FILM COATED ORAL
Qty: 7 TABLET | Refills: 0 | Status: SHIPPED | OUTPATIENT
Start: 2021-03-30 | End: 2021-05-26

## 2021-03-30 NOTE — TELEPHONE ENCOUNTER
Koko Rosenthal called for an update  I told her that Daniel sent over 1mg of Lunesta and he is going to discuss sleep and anxiety during her appointment on 4/15  Koko Rosenthal stated that she usually takes 3mg of Lunesta but she will try the 1mg  Koko Rosenthal states that she has been really anxious and doesn't want to go to the hospital      Informed Koko Rosenthal to give us a call back if anything changes or if she needs anything

## 2021-03-30 NOTE — TELEPHONE ENCOUNTER
Pt called to see what the status of the Kelin Mayo is  She states she is having problems sleeping combined w/anxiety

## 2021-03-30 NOTE — TELEPHONE ENCOUNTER
Short course of Lunesta 1 mg ordered for the patient  Will discuss sleep and anxiety at next visit on 04/15

## 2021-04-07 ENCOUNTER — PATIENT OUTREACH (OUTPATIENT)
Dept: CASE MANAGEMENT | Facility: HOSPITAL | Age: 51
End: 2021-04-07

## 2021-04-07 NOTE — PROGRESS NOTES
Made out reach call to pt for follow up  Pt stated that she was denied for food stamps  As far as medical she will need to send in proof or verification of unemployment with the amount that is being received  Pt stated that she will get a copy of the unemployment letter and mail it back to the county  Will follow up in two weeks

## 2021-04-08 DIAGNOSIS — K21.00 ESOPHAGITIS, REFLUX: ICD-10-CM

## 2021-04-08 DIAGNOSIS — M25.511 CHRONIC RIGHT SHOULDER PAIN: ICD-10-CM

## 2021-04-08 DIAGNOSIS — G89.29 CHRONIC RIGHT SHOULDER PAIN: ICD-10-CM

## 2021-04-08 DIAGNOSIS — Z87.81 HISTORY OF RIB FRACTURE: ICD-10-CM

## 2021-04-08 DIAGNOSIS — M51.37 DDD (DEGENERATIVE DISC DISEASE), LUMBOSACRAL: ICD-10-CM

## 2021-04-08 DIAGNOSIS — G58.8 INTERCOSTAL NEURALGIA: ICD-10-CM

## 2021-04-08 RX ORDER — TRAMADOL HYDROCHLORIDE 50 MG/1
TABLET ORAL
Qty: 60 TABLET | Refills: 0 | Status: CANCELLED
Start: 2021-04-08

## 2021-04-08 NOTE — TELEPHONE ENCOUNTER
rec'd medication requests for ultram and prilosec  At visit with me on 3/3/2021 it was suggested that pt followup with pain management for ongoing medication management and treatment  She was also advised to f/u with GI  Was she able to do this?

## 2021-04-09 NOTE — TELEPHONE ENCOUNTER
Pt stated that they do not have insurance right now so that's why she hasn't seen any other doctor  That's why she asked for a refill

## 2021-04-12 RX ORDER — OMEPRAZOLE 40 MG/1
CAPSULE, DELAYED RELEASE ORAL
Qty: 30 CAPSULE | Refills: 1 | Status: SHIPPED | OUTPATIENT
Start: 2021-04-12 | End: 2021-06-14 | Stop reason: SDUPTHER

## 2021-04-12 NOTE — TELEPHONE ENCOUNTER
I see that pt is following with our care management team and is working on getting insurance  It looks like alicia kate last filled for for ultram   Please have her reach out to his office  I have only met patient once and at that time we discussed concerns over possible drug-drug interactions    I have filled rx for prilosec - shortterm only - as I still urge visit with CIRILO arzate

## 2021-04-14 ENCOUNTER — TELEPHONE (OUTPATIENT)
Dept: PSYCHIATRY | Facility: CLINIC | Age: 51
End: 2021-04-14

## 2021-04-14 ENCOUNTER — TELEPHONE (OUTPATIENT)
Dept: FAMILY MEDICINE CLINIC | Facility: CLINIC | Age: 51
End: 2021-04-14

## 2021-04-14 RX ORDER — TRAMADOL HYDROCHLORIDE 50 MG/1
TABLET ORAL
Qty: 60 TABLET | Refills: 0 | Status: SHIPPED | OUTPATIENT
Start: 2021-04-14 | End: 2022-05-25 | Stop reason: ALTCHOICE

## 2021-04-14 NOTE — TELEPHONE ENCOUNTER
Tramadol is a pain medication; this is not something I would have filled  I d/bandar it by accident and reordered it as a no print because I could not figure out how to undo the accident d/c  If you review the PDMP it was never filled with a script written by me

## 2021-04-14 NOTE — TELEPHONE ENCOUNTER
I checked the pa-dmp - and I did fill rx for pt's ultram following her initial visit with me on 3/3/2021  Claudette Lie accidentally d/c ultram from med list at time of visit - and then re-ordered it just to add back to list - but did not send in rx  At time of pt's visit on 3/3/2021 - I advised her to followup with pain management for ongoing pain medication management -   I checked the referral to pain management - and they tried to contact pt X 3 attempts     I see that care manager team is working with patient re: various issues including health insurance  She is waiting to find out if she is covered/approved for health insurance - so cannot schedule visit with pain management at this time due to cost  She states that getting drug testing and studies thru pain management are very expensive  I personally called pt back at 1503 with this information to avoid confusion    I reviewed her med list and she is only taking lunesta HS prn  And prilosec 40 mg daily (is planning on scheduling with GI once insured)  And ultram 50 mg bid  Pt states once she is back with pain management - she generally does not need pain meds as the injections help    I stated that I would send in a month's worth of ultram to hold her over - and to continue to avoid driving/working while taking due to sedation

## 2021-04-14 NOTE — TELEPHONE ENCOUNTER
Serina Boyce called and said that she contacted her PCP to refill her Tramadol but they refused to fill it as it was prescribed on 3/9 by Junior Boyce was confused as she has never called or asked daniel to fill this medication for her  I advised her that it was discussed during her appointment on 3/9 with Daniel that he would fill it as her PCP was uncomfortable with filling it while she was prescribed psych meds  Patient still expressed confusion and said that her pharmacy never gave her the prescription on 3/9  I advised her to contact her pharmacy as they may the script there for her if she never picked it up

## 2021-04-14 NOTE — TELEPHONE ENCOUNTER
Informed pt of the below  She states that alicia kate did not fill it   On the bottle it states "campos Russell " filled it for her on 03/4/2021    It was sent to [facility]

## 2021-04-14 NOTE — TELEPHONE ENCOUNTER
Pt called back to let us know she called his office because she thought you had filled it last   Pt was told that [address] had filled it for the 9th

## 2021-04-15 ENCOUNTER — TELEMEDICINE (OUTPATIENT)
Dept: PSYCHIATRY | Facility: CLINIC | Age: 51
End: 2021-04-15
Payer: COMMERCIAL

## 2021-04-15 DIAGNOSIS — F33.2 SEVERE EPISODE OF RECURRENT MAJOR DEPRESSIVE DISORDER, WITHOUT PSYCHOTIC FEATURES (HCC): Primary | ICD-10-CM

## 2021-04-15 DIAGNOSIS — G47.09 OTHER INSOMNIA: ICD-10-CM

## 2021-04-15 DIAGNOSIS — F41.1 GENERALIZED ANXIETY DISORDER: ICD-10-CM

## 2021-04-15 PROCEDURE — 99213 OFFICE O/P EST LOW 20 MIN: CPT | Performed by: NURSE PRACTITIONER

## 2021-04-15 RX ORDER — GABAPENTIN 600 MG/1
600 TABLET ORAL 3 TIMES DAILY
Qty: 30 TABLET | Refills: 2 | Status: SHIPPED | OUTPATIENT
Start: 2021-04-15 | End: 2021-04-20

## 2021-04-15 NOTE — PSYCH
It was my intent to perform this visit via video technology but the patient was not able to do a video connection so the visit was completed via audio telephone only  Virtual Regular Visit    Problem List Items Addressed This Visit        Other    Severe episode of recurrent major depressive disorder, without psychotic features ([facility] [address] ) - Primary    Generalized anxiety disorder    Insomnia             Encounter provider CARLITOS Sepulveda    Provider located at   [address] [place] [address] [address]  [facility] [id number] [phone]    Recent Visits  Date Type Provider Dept   04/14/21 Telephone Angela Daly, [address] [facility] ([place] recent visits within past 7 days and meeting all other requirements     Today's Visits  Date Type Provider Dept   04/15/21 Telemedicine CARLITOS [facility]   Showing today's visits and meeting all other requirements     Future Appointments  No visits were found meeting these conditions  Showing future appointments within next 150 days and meeting all other requirements      The patient was identified by name and date of birth  Citlali Lambert was informed that this is a telemedicine visit and that the visit is being conducted through [employer]  My office door was closed  No one else was in the room  She acknowledged consent and understanding of privacy and security of the video platform  The patient has agreed to participate and understands they can discontinue the visit at any time  Patient is aware this is a billable service       HPI     Current Outpatient Medications   Medication Sig Dispense Refill    al mag oxide-diphenhydramine-lidocaine viscous (MAGIC MOUTHWASH) 1:1:1 suspension Swish and spit 10 mL every 4 (four) hours as needed for mouth pain or discomfort (Patient not taking: Reported on 3/3/2021) 500 mL 1    budesonide-formoterol (Symbicort) 160-4 5 mcg/act inhaler Inhale 2 puffs 2 (two) times a day Rinse mouth after use   (Patient not taking: Reported on 3/3/2021) 10 2 Inhaler 3    butalbital-acetaminophen-caffeine (FIORICET,ESGIC) -40 mg per tablet Take 1 tablet by mouth daily as needed for headaches (Patient not taking: Reported on 3/3/2021) 90 tablet 0    eszopiclone (LUNESTA) 1 mg tablet Take 1 tablet (1 mg total) by mouth daily at bedtime as needed for sleep Take immediately before bedtime 7 tablet 0    famotidine (PEPCID) 20 mg tablet Take 1 tablet (20 mg total) by mouth daily (Patient not taking: Reported on 3/3/2021) 30 tablet 3    fluticasone (FLONASE) 50 mcg/act nasal spray 1 spray into each nostril daily (Patient not taking: Reported on 3/3/2021) 1 Bottle 1    hydrOXYzine HCL (ATARAX) 50 mg tablet Take two half tablets by mouth 8 hours apart during the day and one full tablet at bedtime 60 tablet 0    lisinopril (ZESTRIL) 20 mg tablet Take 1 tablet (20 mg total) by mouth daily (Patient not taking: Reported on 3/3/2021) 90 tablet 0    nicotine (NICODERM CQ) 21 mg/24 hr TD 24 hr patch APPLY 1 PATCH EVERY DAY (Patient not taking: Reported on 10/8/2020) 28 patch 0    omeprazole (PriLOSEC) 40 MG capsule Take 1 capsule po in the am 30 min prior to eating prn 30 capsule 1    ondansetron (ZOFRAN) 4 mg tablet Take 1 tablet (4 mg total) by mouth daily as needed for nausea or vomiting 90 tablet 1    polyethylene glycol (GLYCOLAX) powder Take 17 g by mouth 2 (two) times a day (Patient not taking: Reported on 3/3/2021) 850 g 6    psyllium (METAMUCIL) packet Take 1 packet by mouth daily (Patient not taking: Reported on 3/3/2021) 30 packet 0    traMADol (ULTRAM) 50 mg tablet Take 1 tab po qd - bid prn pain; caution for sedation; avoid driving/working while taking; do NOT take with zofran or SSRI/SNRI 60 tablet 0    triamcinolone (KENALOG) 0 1 % oral topical paste Apply after meals and at bedtime x 14 days (Patient not taking: Reported on 3/3/2021) 5 g 1     No current facility-administered medications for this visit  Review of Systems  Video Exam    There were no vitals filed for this visit  Physical Exam   As a result of this visit, I have referred the patient for further respiratory evaluation  No    I spent 15 minutes directly with the patient during this visit  VIRTUAL VISIT DISCLAIMER    Brookie Felty acknowledges that she has consented to an online visit or consultation  She understands that the online visit is based solely on information provided by her, and that, in the absence of a face-to-face physical evaluation by the physician, the diagnosis she receives is both limited and provisional in terms of accuracy and completeness  This is not intended to replace a full medical face-to-face evaluation by the physician  Brookie Felty understands and accepts these terms  MEDICATION MANAGEMENT NOTE        [facility]    Name and Date of Birth:  Brookie Felty 44 y o  1970 MRN: [id number]    Date of Visit: April 15, 2021    Allergies   Allergen Reactions    Chantix [Varenicline]     Ibuprofen Other (See Comments)    Lyrica [Pregabalin] Other (See Comments)     bruising    Penicillins Other (See Comments)     ? hives    Sulfa Antibiotics Other (See Comments)     sloughing skin in mouth    Sulfasalazine      SUBJECTIVE:    Herlinda Aase is seen today for a follow up for Major Depressive Disorder, Generalized Anxiety Disorder and insomnia  She reports that she has done fairly well since the last visit  States that chronic pain insomnia remain problematic for the patient  Overall she feels she is doing better than when she was taking psychotropic medications for depression anxiety  States hydroxyzine addition was not helpful at bedtime for sleep  Patient is seeking prescription for Lunesta for chronic insomnia  Discussed use to [facility] for short-term sleeping issues only  Will restart gabapentin for nighttime anxiety which patient feels is driving insomnia  Patient does not wish to start is SSRI for anxiety at this time  She denies any side effects from medications  PLAN:    Discontinue hydroxyzine   initiate gabapentin 600 mg p o  HS  Patient may take half for whole tablet at bedtime as needed for sleep  Aware of 24 hour and weekend coverage for urgent situations accessed by calling [facility] main practice number    Diagnoses and all orders for this visit:    Severe episode of recurrent major depressive disorder, without psychotic features ([facility] [address] )    Generalized anxiety disorder    Other insomnia        Current Outpatient Medications on File Prior to Visit   Medication Sig Dispense Refill    al mag oxide-diphenhydramine-lidocaine viscous (MAGIC MOUTHWASH) 1:1:1 suspension Swish and spit 10 mL every 4 (four) hours as needed for mouth pain or discomfort (Patient not taking: Reported on 3/3/2021) 500 mL 1    budesonide-formoterol (Symbicort) 160-4 5 mcg/act inhaler Inhale 2 puffs 2 (two) times a day Rinse mouth after use   (Patient not taking: Reported on 3/3/2021) 10 2 Inhaler 3    butalbital-acetaminophen-caffeine (FIORICET,ESGIC) -40 mg per tablet Take 1 tablet by mouth daily as needed for headaches (Patient not taking: Reported on 3/3/2021) 90 tablet 0    eszopiclone (LUNESTA) 1 mg tablet Take 1 tablet (1 mg total) by mouth daily at bedtime as needed for sleep Take immediately before bedtime 7 tablet 0    famotidine (PEPCID) 20 mg tablet Take 1 tablet (20 mg total) by mouth daily (Patient not taking: Reported on 3/3/2021) 30 tablet 3    fluticasone (FLONASE) 50 mcg/act nasal spray 1 spray into each nostril daily (Patient not taking: Reported on 3/3/2021) 1 Bottle 1    hydrOXYzine HCL (ATARAX) 50 mg tablet Take two half tablets by mouth 8 hours apart during the day and one full tablet at bedtime 60 tablet 0    lisinopril (ZESTRIL) 20 mg tablet Take 1 tablet (20 mg total) by mouth daily (Patient not taking: Reported on 3/3/2021) 90 tablet 0    nicotine (NICODERM CQ) 21 mg/24 hr TD 24 hr patch APPLY 1 PATCH EVERY DAY (Patient not taking: Reported on 10/8/2020) 28 patch 0    omeprazole (PriLOSEC) 40 MG capsule Take 1 capsule po in the am 30 min prior to eating prn 30 capsule 1    ondansetron (ZOFRAN) 4 mg tablet Take 1 tablet (4 mg total) by mouth daily as needed for nausea or vomiting 90 tablet 1    polyethylene glycol (GLYCOLAX) powder Take 17 g by mouth 2 (two) times a day (Patient not taking: Reported on 3/3/2021) 850 g 6    psyllium (METAMUCIL) packet Take 1 packet by mouth daily (Patient not taking: Reported on 3/3/2021) 30 packet 0    traMADol (ULTRAM) 50 mg tablet Take 1 tab po qd - bid prn pain; caution for sedation; avoid driving/working while taking; do NOT take with zofran or SSRI/SNRI 60 tablet 0    triamcinolone (KENALOG) 0 1 % oral topical paste Apply after meals and at bedtime x 14 days (Patient not taking: Reported on 3/3/2021) 5 g 1     No current facility-administered medications on file prior to visit  Psychotherapy Provided:     Individual psychotherapy provided: Yes  Counseling was provided during the session today for 16 minutes  Supportive counseling provided  Discussed sleep hygiene, mind fullness meditation and breathing exercises for sleep impro    HPI ROS Appetite Changes and Sleep:     She reports difficulty falling asleep, normal appetite, adequate energy level   Patient denies suicidal or homicidal ideation    Review Of Systems:      General emotional problems, sleep disturbances and decreased functioning   Personality no change in personality   Constitutional negative   ENT negative   Cardiovascular negative   Respiratory negative   Gastrointestinal negative   Genitourinary negative   Musculoskeletal negative   Integumentary negative   Neurological negative   Endocrine negative   Other Symptoms none, all other systems are negative     Mental Status Evaluation:    Appearance   Unable to assess   Behavior cooperative   Mood anxious  Depression Scale -  of 10 (0 = No depression)  Anxiety Scale -  of 10 (0 = No anxiety)   Speech Normal rate and volume   Affect  unable to assess   Thought Processes Goal directed and coherent   Thought Content Does not verbalize delusional material   Associations Tightly connected   Perceptual Disturbances Denies hallucinations and does not appear to be responding to internal stimuli   Risk Potential Suicidal/Homicidal Ideation - No evidence of suicidal or homicidal ideation and Patient does not verbalize suicidal or homicidal ideation  Risk of Violence - No evidence of risk for violence found on assessment  Risk of Self Mutilation - No evidence of risk for self mutilation found on assessment   Orientation oriented to person, place, time/date and situation   Memory recent and remote memory grossly intact   Consciousness alert and awake   Attention/Concentration attention span and concentration are age appropriate   Insight poor   Judgement fair   Muscle Strength and Gait  unable to assess   Motor Activity  unable to assess   Language no difficulty naming common objects, no difficulty repeating a phrase, no difficulty writing a sentence   Fund of Knowledge adequate knowledge of current events  adequate fund of knowledge regarding past history  adequate fund of knowledge regarding vocabulary      Past Psychiatric History Update:     Inpatient Psychiatric Admission Since Last Encounter:   no  Changes to Outpatient Psychiatric Treatment Team:    no  Suicide Attempt Or Self Mutilation Since Last Encounter:   no  Incidence of Violent Behavior Since Last Encounter:   no    Traumatic History Update:     New Onset of Abuse Since Last Encounter:   no  Traumatic Events Since Last Encounter:   no    Past Medical History:    Past Medical History:   Diagnosis Date    Chronic pain disorder     Depression     GERD (gastroesophageal reflux disease)     History of electroconvulsive therapy     Low back pain     Self-injurious behavior     Suicide attempt ([facility] [address] )      No past medical history pertinent negatives  Past Surgical History:   Procedure Laterality Date     SECTION      COLONOSCOPY      PANCREAS SURGERY      "pseudocysts" per patient's  Ricki Desai ESOPHAGOGASTRODUODENOSCOPY TRANSORAL DIAGNOSTIC N/A 4/10/2018    Procedure: EGD AND COLONOSCOPY;  Surgeon: Natasha Fowler MD;  [place] [facility];   Service: Gastroenterology     Allergies   Allergen Reactions    Chantix [Varenicline]     Ibuprofen Other (See Comments)    Lyrica [Pregabalin] Other (See Comments)     bruising    Penicillins Other (See Comments)     ? hives    Sulfa Antibiotics Other (See Comments)     sloughing skin in mouth    Sulfasalazine      Substance Abuse History:    Social History     Substance and Sexual Activity   Alcohol Use Yes    Frequency: Monthly or less    Comment: vodka and beer, has decreased to occasionally     Social History     Substance and Sexual Activity   Drug Use Yes    Types: Marijuana    Comment: medical     Social History:    Social History     Socioeconomic History    Marital status: /Civil Union     Spouse name: Not on file    Number of children: Not on file    Years of education: Not on file    Highest education level: Not on file   Occupational History    Occupation: disability   Social Needs    Financial resource strain: Not on file    Food insecurity     Worry: Not on file     Inability: Not on file   [employer] needs     Medical: Not on file     Non-medical: Not on file   Tobacco Use    Smoking status: Current Every Day Smoker     Packs/day: 1 00     Years: 35 00     Pack years: 35 00     Types: Cigarettes    Smokeless tobacco: Never Used   Substance and Sexual Activity    Alcohol use: Yes     Frequency: Monthly or less     Comment: vodka and beer, has decreased to occasionally    Drug use: Yes     Types: Marijuana     Comment: medical    Sexual activity: Yes     Partners: Male     Comment: PT is    Lifestyle    Physical activity     Days per week: Not on file     Minutes per session: Not on file    Stress: Not on file   Relationships    Social connections     Talks on phone: Not on file     Gets together: Not on file     Attends Mandaeism service: Not on file     Active member of club or organization: Not on file     Attends meetings of clubs or organizations: Not on file     Relationship status: Not on file    Intimate partner violence     Fear of current or ex partner: Not on file     Emotionally abused: Not on file     Physically abused: Not on file     Forced sexual activity: Not on file   Other Topics Concern    Not on file   Social History Narrative    Lives with spouse     Family Psychiatric History:     Family History   Problem Relation Age of Onset    Arthritis Mother     Coronary artery disease Mother     Colon cancer Family     Parkinsonism Father     Parkinsonism Paternal Grandmother     Coronary artery disease Paternal Grandfather     Depression Neg Hx      History Review: The following portions of the patient's history were reviewed and updated as appropriate: allergies, current medications, past family history, past medical history, past social history, past surgical history and problem list     OBJECTIVE:     Vital signs in last 24 hours: There were no vitals filed for this visit  Laboratory Results: I have personally reviewed all pertinent laboratory/tests results      Suicide/Homicide Risk Assessment:    Risk of Harm to Self:   The following ratings are based on assessment at the time of the interview   Recent Specific Risk Factors include: current anxiety symptoms    Risk of Harm to Others:   The following ratings are based on assessment at the time of the interview   Recent Specific Risk Factors include: none     The following interventions are recommended: no intervention changes needed    Medications Risks/Benefits:      Risks, Benefits And Possible Side Effects Of Medications:    Discussed risks and benefits of treatment with patient including Risk of sedation with higher doses of  gabapentin     Controlled Medication Discussion:     Fe Phillips has been filling controlled prescriptions on time as prescribed according to [place] Prescription Drug Monitoring Program    Treatment Plan:    Due for update/Updated:   CARLITOS Jaime 04/15/21    This note was shared with patient

## 2021-04-16 ENCOUNTER — PATIENT OUTREACH (OUTPATIENT)
Dept: CASE MANAGEMENT | Facility: HOSPITAL | Age: 51
End: 2021-04-16

## 2021-04-16 NOTE — PROGRESS NOTES
CHW received call from pt stating that she has received [place] medical assistance  Pt did state that although she received medical benefits she has no benefits for mental health  CHW advised pt to call BETHANY HAWKINS to see if that is a benefit that can be added

## 2021-04-19 ENCOUNTER — TELEPHONE (OUTPATIENT)
Dept: PSYCHIATRY | Facility: CLINIC | Age: 51
End: 2021-04-19

## 2021-04-19 DIAGNOSIS — F41.1 GENERALIZED ANXIETY DISORDER: Primary | ICD-10-CM

## 2021-04-19 NOTE — TELEPHONE ENCOUNTER
Called Yoseph back with [employer] recommendations  Yoseph states that the first night she took the Gabapentin, she took a whole tablet and woke up with a bad headache  Since then she has been taking a half of a tablet  She is willing to try the 100 MG tablets TID however, due to not having insurance, she is wondering if Conchis Lyon can just send in a week supply to see how it works rather than her losing money on a month supply and not being able to take it  Will refer to Muriel Velez for review

## 2021-04-19 NOTE — TELEPHONE ENCOUNTER
Returned Faina Mckay Katlin states that she started her Gabapentin again on Thursday and has been waking up every morning with a bad headache  States nothing else has changed so she believes the Gabapentin is causing it  Has never had headaches from it in the past but now since starting it again she is getting them  She would like recommendation from Jeremy mclean  Will refer to Willem Enrique for review

## 2021-04-19 NOTE — TELEPHONE ENCOUNTER
Jose Martinez called and stated that she has been taking her gabapentin since Wednesday  Since she started she has been waking up every morning with a really bad headache  She would like to stop this medication and try something else

## 2021-04-20 RX ORDER — GABAPENTIN 100 MG/1
100 CAPSULE ORAL 3 TIMES DAILY
Qty: 21 CAPSULE | Refills: 0 | Status: SHIPPED | OUTPATIENT
Start: 2021-04-20 | End: 2021-05-26

## 2021-04-20 NOTE — TELEPHONE ENCOUNTER
I spk to patient and she is aware that she can not continue to come to this office with the insurance she currently has  She stated she was going to contact her  and will see if she can get [facility] services with her straight MEDICAID

## 2021-04-20 NOTE — ADDENDUM NOTE
Addended by: Chinyere Kwon on: 4/20/2021 11:34 AM     Modules accepted: Orders
normal rate, regular rhythm, and no murmur.

## 2021-04-22 ENCOUNTER — PATIENT OUTREACH (OUTPATIENT)
Dept: CASE MANAGEMENT | Facility: HOSPITAL | Age: 51
End: 2021-04-22

## 2021-04-22 NOTE — PROGRESS NOTES
Made outreach call to pt to follow up regarding her MA  Pt stated that she was told by her pcp that she has MA but when pt called the [facility] office she was told that it was still pending  The county just needed a letter regarding her mortgage  Pt stated that she faxed over the document they requested and it was received  CHW will follow up with pt in two weeks

## 2021-05-06 ENCOUNTER — PATIENT OUTREACH (OUTPATIENT)
Dept: CASE MANAGEMENT | Facility: HOSPITAL | Age: 51
End: 2021-05-06

## 2021-05-06 NOTE — PROGRESS NOTES
Made outreach call to pt regarding status of the MA application  Pt stated that she sent in copies of her mortgage papers that were requested by the [facility] office  She has not heard back from [facility] as of yet  CHW stated to pt to call me once she has heard back from them  Will follow up with pt in two weeks

## 2021-05-20 ENCOUNTER — PATIENT OUTREACH (OUTPATIENT)
Dept: CASE MANAGEMENT | Facility: HOSPITAL | Age: 51
End: 2021-05-20

## 2021-05-20 NOTE — PROGRESS NOTES
CHW received call from pt stating that pt  Ricki found employment and will have medical benefits beginning on day one  Pt thanked me for all the help I provided for her  CHW will close case as at this time there are no other resources that the pt will require  CHW stated to pt that if a need arises she may call and we can reopen the case

## 2021-05-24 ENCOUNTER — TELEPHONE (OUTPATIENT)
Dept: PSYCHIATRY | Facility: CLINIC | Age: 51
End: 2021-05-24

## 2021-05-24 DIAGNOSIS — F41.1 GENERALIZED ANXIETY DISORDER: ICD-10-CM

## 2021-05-24 DIAGNOSIS — F33.2 SEVERE EPISODE OF RECURRENT MAJOR DEPRESSIVE DISORDER, WITHOUT PSYCHOTIC FEATURES (HCC): Primary | ICD-10-CM

## 2021-05-24 NOTE — TELEPHONE ENCOUNTER
Cassondra Severin called and would like to know if she would be able to start taking efexor again? She said she was on it previously, but has been having a lot of anxiety lately    She is scheduled to see Deanna Bundy on June 15y

## 2021-05-25 NOTE — TELEPHONE ENCOUNTER
Returned Faina  Freddy [place] states that she is having a little bit of a rough time and is wondering if [place] would start her back up on her Effexor  States she has been off of it since February  Also, she is no longer taking her Lunesta or her Gabapentin  States it does not work  She would like to see if Susie Miranda can prescribe the Effexor soon so that it has a chance to work before her 6/15 appointment with him  Informed her Susie Miranda is out of the office today but will return tomorrow and I will call her back with his recommendation  Will refer to Elisabet Messer for review

## 2021-05-26 RX ORDER — VENLAFAXINE HYDROCHLORIDE 37.5 MG/1
37.5 CAPSULE, EXTENDED RELEASE ORAL DAILY
Qty: 30 CAPSULE | Refills: 1 | Status: SHIPPED | OUTPATIENT
Start: 2021-05-26 | End: 2021-06-15

## 2021-05-26 NOTE — TELEPHONE ENCOUNTER
Kate Yeh returned my call  I informed her that Daniel submitted requested script for Effexor to her pharmacy  Advised her it is 37 5 MG daily  Asked if she had problems with her insurance covering this medication in the past but she states she never had a problem  She will pick medication up today  She is glad to be starting this prior to appointment so that it can be addressed at that time

## 2021-06-01 ENCOUNTER — PATIENT OUTREACH (OUTPATIENT)
Dept: FAMILY MEDICINE CLINIC | Facility: CLINIC | Age: 51
End: 2021-06-01

## 2021-06-01 NOTE — PROGRESS NOTES
Patient had been working with CHW, Efra Corea, to apply for medical assistance or other insurance she may have been eligible for at this time  However, patient contacted CHW to advise that her  has now found employment and she will have medical insurance benefits through his employment  No other psychosocial needs at time of this encounter  SW LOVE will close patient to Care Management, but remains available for additional support as needed via order

## 2021-06-10 ENCOUNTER — HOSPITAL ENCOUNTER (EMERGENCY)
Facility: HOSPITAL | Age: 51
Discharge: HOME | End: 2021-06-10
Attending: EMERGENCY MEDICINE | Admitting: EMERGENCY MEDICINE
Payer: COMMERCIAL

## 2021-06-10 VITALS
OXYGEN SATURATION: 98 % | TEMPERATURE: 98.1 F | BODY MASS INDEX: 19.93 KG/M2 | DIASTOLIC BLOOD PRESSURE: 83 MMHG | HEART RATE: 104 BPM | WEIGHT: 123.46 LBS | SYSTOLIC BLOOD PRESSURE: 151 MMHG | RESPIRATION RATE: 14 BRPM

## 2021-06-10 DIAGNOSIS — M25.511 CHRONIC RIGHT SHOULDER PAIN: Primary | ICD-10-CM

## 2021-06-10 DIAGNOSIS — G89.29 CHRONIC RIGHT SHOULDER PAIN: Primary | ICD-10-CM

## 2021-06-10 PROCEDURE — 96372 THER/PROPH/DIAG INJ SC/IM: CPT

## 2021-06-10 PROCEDURE — 99283 EMERGENCY DEPT VISIT LOW MDM: CPT

## 2021-06-10 PROCEDURE — 99284 EMERGENCY DEPT VISIT MOD MDM: CPT | Performed by: PHYSICIAN ASSISTANT

## 2021-06-10 RX ORDER — METHOCARBAMOL 500 MG/1
500 TABLET, FILM COATED ORAL 2 TIMES DAILY PRN
Qty: 20 TABLET | Refills: 0 | Status: SHIPPED | OUTPATIENT
Start: 2021-06-10

## 2021-06-10 RX ORDER — KETOROLAC TROMETHAMINE 30 MG/ML
30 INJECTION, SOLUTION INTRAMUSCULAR; INTRAVENOUS ONCE
Status: COMPLETED | OUTPATIENT
Start: 2021-06-10 | End: 2021-06-10

## 2021-06-10 RX ADMIN — KETOROLAC TROMETHAMINE 30 MG: 30 INJECTION, SOLUTION INTRAMUSCULAR; INTRAVENOUS at 07:41

## 2021-06-10 NOTE — DISCHARGE INSTRUCTIONS
Take medications as previously instructed  Take Robaxin as prescribed as needed for muscle spasms    Do not drive or operate heavy machinery while taking medication  Continue Tylenol at home as needed for pain  Follow-up with PCP for monitoring of symptoms  Follow-up with Pain specialist for further evaluation of symptoms  Return to ED if symptoms worsen including increasing pain, numbness, tingling, weakness of the arm, fevers

## 2021-06-10 NOTE — ED PROVIDER NOTES
History  Chief Complaint   Patient presents with    Neck Pain     pt c/o right sided neck pain radiating to shoulder ongoing for 3 weeks getting worse  taking otc meds and see pain management taking tramadol no relief  Patient is a 47-year-old female past medical history of depression, chronic right shoulder pain who presents with right shoulder pain  Patient has been following with pain management for her neck and right shoulder pain  She has previously got injections and is due for an injection on 06/20  She currently takes tramadol, but over the last few days that has provided little relief  She denies any known injury to her neck or shoulder, change in the pain, numbness, tingling, weakness of the arms, swelling of the joint, chest pain, shortness of breath, palpitations, history of spinal surgeries, history of IV drug use  She describes an aching pain it starts in her right lateral neck and radiates to her right shoulder, exactly like her prior symptoms  The pain is worse with movement, better with rest   Patient attempted to call her pain management specialist yesterday, but no one answered and she has not received a call back  Patient states she is otherwise in her usual state of health and denies any fevers, chills, diaphoresis, headaches, dizziness, lightheadedness, congestion, cough, abdominal pain, nausea, vomiting, diarrhea, urinary changes, rash  Prior to Admission Medications   Prescriptions Last Dose Informant Patient Reported? Taking? al mag oxide-diphenhydramine-lidocaine viscous (MAGIC MOUTHWASH) 1:1:1 suspension  Self No No   Sig: Swish and spit 10 mL every 4 (four) hours as needed for mouth pain or discomfort   Patient not taking: Reported on 3/3/2021   budesonide-formoterol (Symbicort) 160-4 5 mcg/act inhaler  Self No No   Sig: Inhale 2 puffs 2 (two) times a day Rinse mouth after use     Patient not taking: Reported on 3/3/2021   butalbital-acetaminophen-caffeine (FIORICET,ESGIC) -40 mg per tablet  Self No No   Sig: Take 1 tablet by mouth daily as needed for headaches   Patient not taking: Reported on 3/3/2021   famotidine (PEPCID) 20 mg tablet   No No   Sig: Take 1 tablet (20 mg total) by mouth daily   Patient not taking: Reported on 3/3/2021   fluticasone (FLONASE) 50 mcg/act nasal spray  Self No No   Si spray into each nostril daily   Patient not taking: Reported on 3/3/2021   lisinopril (ZESTRIL) 20 mg tablet   No No   Sig: Take 1 tablet (20 mg total) by mouth daily   Patient not taking: Reported on 3/3/2021   nicotine (NICODERM CQ) 21 mg/24 hr TD 24 hr patch  Self No No   Sig: APPLY 1 [address] DAY   Patient not taking: Reported on 10/8/2020   omeprazole (PriLOSEC) 40 MG capsule   No No   Sig: Take 1 capsule po in the am 30 min prior to eating prn   ondansetron (ZOFRAN) 4 mg tablet   No No   Sig: Take 1 tablet (4 mg total) by mouth daily as needed for nausea or vomiting   polyethylene glycol (GLYCOLAX) powder  Self No No   Sig: Take 17 g by mouth 2 (two) times a day   Patient not taking: Reported on 3/3/2021   psyllium (METAMUCIL) packet  Self No No   Sig: Take 1 packet by mouth daily   Patient not taking: Reported on 3/3/2021   traMADol (ULTRAM) 50 mg tablet   No Yes   Sig: Take 1 tab po qd - bid prn pain; caution for sedation; avoid driving/working while taking; do NOT take with zofran or SSRI/SNRI   triamcinolone (KENALOG) 0 1 % oral topical paste  Self No No   Sig: Apply after meals and at bedtime x 14 days   Patient not taking: Reported on 3/3/2021   venlafaxine (EFFEXOR-XR) 37 5 mg 24 hr capsule   No No   Sig: Take 1 capsule (37 5 mg total) by mouth daily      Facility-Administered Medications: None       Past Medical History:   Diagnosis Date    Chronic pain disorder     Depression     GERD (gastroesophageal reflux disease)     History of electroconvulsive therapy     Low back pain     Self-injurious behavior     Suicide attempt ([facility] [address] ) Past Surgical History:   Procedure Laterality Date     SECTION      COLONOSCOPY      PANCREAS SURGERY      "pseudocysts" per patient's  Ricki Infante    [place] ESOPHAGOGASTRODUODENOSCOPY TRANSORAL DIAGNOSTIC N/A 4/10/2018    Procedure: EGD AND COLONOSCOPY;  Surgeon: Martha Calderon MD;  [place] [facility]; Service: Gastroenterology       Family History   Problem Relation Age of Onset    Arthritis Mother     Coronary artery disease Mother     Colon cancer Family     Parkinsonism Father     Parkinsonism Paternal Grandmother     Coronary artery disease Paternal Grandfather     Depression Neg Hx      I have reviewed and agree with the history as documented  E-Cigarette/Vaping    E-Cigarette Use Never User      E-Cigarette/Vaping Substances    Nicotine No     THC No     CBD No     Flavoring No     Other No     Unknown No      Social History     Tobacco Use    Smoking status: Current Every Day Smoker     Packs/day: 1 00     Years: 35 00     Pack years: 35 00     Types: Cigarettes    Smokeless tobacco: Never Used   Substance Use Topics    Alcohol use: Yes     Frequency: Monthly or less     Comment: vodka and beer, has decreased to occasionally    Drug use: Yes     Types: Marijuana     Comment: medical       Review of Systems   Constitutional: Negative for chills, diaphoresis and fever  HENT: Negative for congestion  Respiratory: Negative for cough, shortness of breath, wheezing and stridor  Cardiovascular: Negative for chest pain and palpitations  Gastrointestinal: Negative for abdominal pain, diarrhea, nausea and vomiting  Genitourinary: Negative for difficulty urinating and dysuria  Musculoskeletal: Positive for arthralgias and neck pain  Negative for back pain, joint swelling and myalgias  Skin: Negative for color change, pallor and rash  Neurological: Negative for dizziness, weakness, light-headedness, numbness and headaches     All other systems reviewed and are negative  Physical Exam  Physical Exam  Vitals signs and nursing note reviewed  Constitutional:       General: She is awake  She is not in acute distress  Appearance: She is well-developed  She is not toxic-appearing or diaphoretic  HENT:      Head: Normocephalic and atraumatic  Right Ear: External ear normal       Left Ear: External ear normal       Nose: Nose normal    Eyes:      Conjunctiva/sclera: Conjunctivae normal       Pupils: Pupils are equal, round, and reactive to light  Neck:      Musculoskeletal: Normal range of motion and neck supple  Normal range of motion  No edema, erythema, pain with movement, spinous process tenderness or muscular tenderness  Cardiovascular:      Rate and Rhythm: Normal rate and regular rhythm  Pulses: Normal pulses  Radial pulses are 2+ on the right side and 2+ on the left side  Heart sounds: Normal heart sounds, S1 normal and S2 normal    Pulmonary:      Effort: Pulmonary effort is normal  No respiratory distress  Breath sounds: Normal breath sounds  No stridor  No decreased breath sounds or wheezing  Abdominal:      General: There is no distension  Musculoskeletal:      Right shoulder: She exhibits decreased range of motion and tenderness  She exhibits no bony tenderness, no swelling, no effusion, no crepitus, no deformity, no laceration, normal pulse and normal strength  Left shoulder: Normal       Right elbow: Normal      Right wrist: Normal       Cervical back: She exhibits tenderness  She exhibits normal range of motion, no bony tenderness, no swelling, no edema, no deformity, no laceration and normal pulse  Thoracic back: Normal         Back:       Right upper arm: Normal         Arms:       Right hand: Normal       Comments: Neurovascularly intact, 5/5 strength in bilateral upper extremities  Patient with diffuse tenderness along right posterior and superior shoulder into the trapezius muscle    No swelling, erythema, skin changes, crepitus, pancho deformity noted  Patient with near full active ROM of right shoulder, limited secondary to pain  No cervical midline tenderness, step-off deformity noted  Skin:     General: Skin is warm and dry  Capillary Refill: Capillary refill takes less than 2 seconds  Neurological:      Mental Status: She is alert and oriented to person, place, and time  Psychiatric:         Behavior: Behavior is cooperative  Vital Signs  ED Triage Vitals [06/10/21 0711]   Temperature Pulse Respirations Blood Pressure SpO2   98 1 °F (36 7 °C) 104 14 151/83 98 %      Temp Source Heart Rate Source Patient Position - Orthostatic VS BP Location FiO2 (%)   Oral Monitor Sitting Left arm --      Pain Score       Worst Possible Pain           Vitals:    06/10/21 0711   BP: 151/83   Pulse: 104   Patient Position - Orthostatic VS: Sitting         Visual Acuity      ED Medications  Medications   ketorolac (TORADOL) injection 30 mg (30 mg Intramuscular Given 6/10/21 0741)       Diagnostic Studies  Results Reviewed     None                 No orders to display              Procedures  Procedures         ED Course                             SBIRT 22yo+      Most Recent Value   SBIRT (24 yo +)   In order to provide better care to our patients, we are screening all of our patients for alcohol and drug use  Would it be okay to ask you these screening questions? No Filed at: 06/10/2021 1654                    MDM  Number of Diagnoses or Management Options  Chronic right shoulder pain:   Diagnosis management comments: Discussed with patient options for pain management at this time given that she follows with pain management specialist   Patient declined Lidoderm patch, but is agreeable to Toradol and trial of muscle relaxant  Reviewed medication education, treatment at home, appropriate precautions and provided education regarding use of tramadol and muscle relaxers    Recommended follow-up with pain  as soon as possible for further evaluation and monitoring of symptoms  Recommended follow-up with PCP as needed  The management plan was discussed in detail with the patient at bedside and all questions were answered  Provided both verbal and written instructions  Reviewed red flag symptoms and strict return to ED instructions  Patient notes understanding and agrees to plan  Disposition  Final diagnoses:   Chronic right shoulder pain     Time reflects when diagnosis was documented in both MDM as applicable and the Disposition within this note     Time User Action Codes Description Comment    6/10/2021  7:30 AM Lavella Go Add [M54 2,  G89 29] Chronic neck pain     6/10/2021  7:30 AM Kita Olviera [M54 2,  G89 29] Chronic neck pain     6/10/2021  7:30 AM Lavella Go Add [M25 511,  G89 29] Chronic right shoulder pain       ED Disposition     ED Disposition Condition Date/Time Comment    Discharge Stable Thu Josh 10, 2021  7:31 AM Lacie Sinha discharge to home/self care              Follow-up Information     Follow up With Specialties Details Why Contact Info Additional Information    [address] Emergency Department Emergency Medicine  If symptoms worsen [facility] [id number]  [address] [place] [address] Emergency Department, [address] [place] [address], [place] [address], DO Family Medicine  As needed [address]  [address] [place] [id number] [phone]       Follow-up with pain management as scheduled               Discharge Medication List as of 6/10/2021  7:35 AM      START taking these medications    Details   methocarbamol (ROBAXIN) 500 mg tablet Take 1 tablet (500 mg total) by mouth 2 (two) times a day as needed for muscle spasms, Starting Thu 6/10/2021, Normal         CONTINUE these medications which have NOT CHANGED    Details   al mag oxide-diphenhydramine-lidocaine viscous (MAGIC MOUTHWASH) 1:1:1 suspension Swish and spit 10 mL every 4 (four) hours as needed for mouth pain or discomfort, Starting Tue 9/22/2020, Normal      budesonide-formoterol (Symbicort) 160-4 5 mcg/act inhaler Inhale 2 puffs 2 (two) times a day Rinse mouth after use , Starting Tue 6/16/2020, Normal      butalbital-acetaminophen-caffeine (FIORICET,ESGIC) -40 mg per tablet Take 1 tablet by mouth daily as needed for headaches, Starting Fri 3/27/2020, Normal      famotidine (PEPCID) 20 mg tablet Take 1 tablet (20 mg total) by mouth daily, Starting Mon 1/11/2021, Normal      fluticasone (FLONASE) 50 mcg/act nasal spray 1 spray into each nostril daily, Starting Tue 9/24/2019, Normal      lisinopril (ZESTRIL) 20 mg tablet Take 1 tablet (20 mg total) by mouth daily, Starting Tue 10/20/2020, Normal      nicotine (NICODERM CQ) 21 mg/24 hr TD 24 hr patch APPLY 1 PATCH EVERY DAY, Normal      omeprazole (PriLOSEC) 40 MG capsule Take 1 capsule po in the am 30 min prior to eating prn, Normal      ondansetron (ZOFRAN) 4 mg tablet Take 1 tablet (4 mg total) by mouth daily as needed for nausea or vomiting, Starting Thu 12/10/2020, Normal      polyethylene glycol (GLYCOLAX) powder Take 17 g by mouth 2 (two) times a day, Starting Fri 8/2/2019, Normal      psyllium (METAMUCIL) packet Take 1 packet by mouth daily, Starting Fri 5/15/2020, Print      traMADol (ULTRAM) 50 mg tablet Take 1 tab po qd - bid prn pain; caution for sedation; avoid driving/working while taking; do NOT take with zofran or SSRI/SNRI, Normal      triamcinolone (KENALOG) 0 1 % oral topical paste Apply after meals and at bedtime x 14 days, Normal      venlafaxine (EFFEXOR-XR) 37 5 mg 24 hr capsule Take 1 capsule (37 5 mg total) by mouth daily, Starting Wed 5/26/2021, Normal           No discharge procedures on file      PDMP Review       Value Time User    PDMP Reviewed  Yes 6/10/2021  7:22 AM Kirstie Escobedo PA-C          ED Provider  Electronically Signed by           Sera Stewart PA-C  06/10/21 0283

## 2021-06-14 DIAGNOSIS — K21.00 ESOPHAGITIS, REFLUX: ICD-10-CM

## 2021-06-14 RX ORDER — OMEPRAZOLE 40 MG/1
CAPSULE, DELAYED RELEASE ORAL
Qty: 30 CAPSULE | Refills: 1 | Status: SHIPPED | OUTPATIENT
Start: 2021-06-14 | End: 2022-05-25 | Stop reason: SDUPTHER

## 2021-06-15 ENCOUNTER — TELEPHONE (OUTPATIENT)
Dept: PSYCHIATRY | Facility: CLINIC | Age: 51
End: 2021-06-15

## 2021-06-15 ENCOUNTER — TELEMEDICINE (OUTPATIENT)
Dept: PSYCHIATRY | Facility: CLINIC | Age: 51
End: 2021-06-15
Payer: COMMERCIAL

## 2021-06-15 DIAGNOSIS — F33.2 SEVERE EPISODE OF RECURRENT MAJOR DEPRESSIVE DISORDER, WITHOUT PSYCHOTIC FEATURES (HCC): ICD-10-CM

## 2021-06-15 DIAGNOSIS — G47.09 OTHER INSOMNIA: Primary | ICD-10-CM

## 2021-06-15 DIAGNOSIS — F41.1 GENERALIZED ANXIETY DISORDER: ICD-10-CM

## 2021-06-15 PROCEDURE — 4004F PT TOBACCO SCREEN RCVD TLK: CPT | Performed by: NURSE PRACTITIONER

## 2021-06-15 PROCEDURE — 99214 OFFICE O/P EST MOD 30 MIN: CPT | Performed by: NURSE PRACTITIONER

## 2021-06-15 RX ORDER — VENLAFAXINE HYDROCHLORIDE 75 MG/1
75 CAPSULE, EXTENDED RELEASE ORAL DAILY
Qty: 30 CAPSULE | Refills: 2 | Status: SHIPPED | OUTPATIENT
Start: 2021-06-15 | End: 2021-09-16 | Stop reason: SDUPTHER

## 2021-06-15 NOTE — BH TREATMENT PLAN
TREATMENT PLAN (Medication Management Only)        [facility]    Name and Date of Birth:  Migel Elaine 44 y o  1970  Date of Treatment Plan: Katie 15, 2021  Diagnosis/Diagnoses:    1  Other insomnia    2  Severe episode of recurrent major depressive disorder, without psychotic features ([facility] [address] )    3  Generalized anxiety disorder      Strengths/Personal Resources for Self-Care: taking medications as prescribed, motivation for treatment, ability to communicate needs  Area/Areas of need (in own words): depressive symptoms  1  Long Term Goal: improve depression  Target Date: 6 months - 12/15/2021  Person/Persons responsible for completion of goal: Clem Portillo  2  Short Term Objective (s) - How will we reach this goal?:   A  Provider new recommended medication/dosage changes and/or continue medication(s): continue current medications as prescribed  B   Attend medication management appointments regularly  C   Attend psychotherapy regularly  Target Date: 6 months - 12/15/2021  Person/Persons Responsible for Completion of Goal: Clem Portillo  Progress Towards Goals: continuing treatment  Treatment Modality: medication management with psychotherapy every 3 months  Review due 90 to 120 days from date of this plan: 6 months - 12/15/2021  Expected length of service: maintenance unless revised  My Physician/PA/NP and I have developed this plan together and I agree to work on the goals and objectives  I understand the treatment goals that were developed for my treatment    Treatment Plan Verbal Consent - Due to COVID     Virtual Visit

## 2021-06-15 NOTE — PSYCH
Virtual Regular Visit    Problem List Items Addressed This Visit        Other    Severe episode of recurrent major depressive disorder, without psychotic features ([facility] [address] )    Relevant Medications    venlafaxine (EFFEXOR-XR) 75 mg 24 hr capsule    Generalized anxiety disorder    Relevant Medications    venlafaxine (EFFEXOR-XR) 75 mg 24 hr capsule             Encounter provider CARLITOS Luong    Provider located at   [facility] [address]  [facility] [id number] [phone]    Recent Visits  No visits were found meeting these conditions  Showing recent visits within past 7 days and meeting all other requirements  Today's Visits  Date Type Provider Dept   06/15/21 Telemedicine CARLITOS [facility]aballo [facility]i Monsivais   Showing today's visits and meeting all other requirements  Future Appointments  No visits were found meeting these conditions  Showing future appointments within next 150 days and meeting all other requirements     The patient was identified by name and date of birth  Cheri Mtz was informed that this is a telemedicine visit and that the visit is being conducted through boolino and patient was informed that this is a secure, HIPAA-complaint platform  She agrees to proceed     My office door was closed  No one else was in the room  She acknowledged consent and understanding of privacy and security of the video platform  The patient has agreed to participate and understands they can discontinue the visit at any time  Patient is aware this is a billable service       HPI     Current Outpatient Medications   Medication Sig Dispense Refill    al mag oxide-diphenhydramine-lidocaine viscous (MAGIC MOUTHWASH) 1:1:1 suspension Swish and spit 10 mL every 4 (four) hours as needed for mouth pain or discomfort (Patient not taking: Reported on 3/3/2021) 500 mL 1    budesonide-formoterol (Symbicort) 160-4 5 mcg/act inhaler Inhale 2 puffs 2 (two) times a day Rinse mouth after use   (Patient not taking: Reported on 3/3/2021) 10 2 Inhaler 3    butalbital-acetaminophen-caffeine (FIORICET,ESGIC) -40 mg per tablet Take 1 tablet by mouth daily as needed for headaches (Patient not taking: Reported on 3/3/2021) 90 tablet 0    famotidine (PEPCID) 20 mg tablet Take 1 tablet (20 mg total) by mouth daily (Patient not taking: Reported on 3/3/2021) 30 tablet 3    fluticasone (FLONASE) 50 mcg/act nasal spray 1 spray into each nostril daily (Patient not taking: Reported on 3/3/2021) 1 Bottle 1    lisinopril (ZESTRIL) 20 mg tablet Take 1 tablet (20 mg total) by mouth daily (Patient not taking: Reported on 3/3/2021) 90 tablet 0    methocarbamol (ROBAXIN) 500 mg tablet Take 1 tablet (500 mg total) by mouth 2 (two) times a day as needed for muscle spasms 20 tablet 0    nicotine (NICODERM CQ) 21 mg/24 hr TD 24 hr patch APPLY 1 PATCH EVERY DAY (Patient not taking: Reported on 10/8/2020) 28 patch 0    omeprazole (PriLOSEC) 40 MG capsule Take 1 capsule po in the am 30 min prior to eating prn 30 capsule 1    ondansetron (ZOFRAN) 4 mg tablet Take 1 tablet (4 mg total) by mouth daily as needed for nausea or vomiting 90 tablet 1    polyethylene glycol (GLYCOLAX) powder Take 17 g by mouth 2 (two) times a day (Patient not taking: Reported on 3/3/2021) 850 g 6    psyllium (METAMUCIL) packet Take 1 packet by mouth daily (Patient not taking: Reported on 3/3/2021) 30 packet 0    traMADol (ULTRAM) 50 mg tablet Take 1 tab po qd - bid prn pain; caution for sedation; avoid driving/working while taking; do NOT take with zofran or SSRI/SNRI 60 tablet 0    triamcinolone (KENALOG) 0 1 % oral topical paste Apply after meals and at bedtime x 14 days (Patient not taking: Reported on 3/3/2021) 5 g 1    venlafaxine (EFFEXOR-XR) 75 mg 24 hr capsule Take 1 capsule (75 mg total) by mouth daily 30 capsule 2     No current facility-administered medications for this visit  Review of Systems  Video Exam    There were no vitals filed for this visit  Physical Exam   As a result of this visit, I have referred the patient for further respiratory evaluation  No    I spent 15 minutes directly with the patient during this visit  VIRTUAL VISIT DISCLAIMER    Brookie Felty acknowledges that she has consented to an online visit or consultation  She understands that the online visit is based solely on information provided by her, and that, in the absence of a face-to-face physical evaluation by the physician, the diagnosis she receives is both limited and provisional in terms of accuracy and completeness  This is not intended to replace a full medical face-to-face evaluation by the physician  Brookie Felty understands and accepts these terms  MEDICATION MANAGEMENT NOTE        [address]    Name and Date of Birth:  Brookie Felty 44 y o  1970 MRN: [id number]    Date of Visit: Katie 15, 2021    Allergies   Allergen Reactions    Chantix [Varenicline]     Ibuprofen Other (See Comments)    Lyrica [Pregabalin] Other (See Comments)     bruising    Penicillins Other (See Comments)     ? hives    Sulfa Antibiotics Other (See Comments)     sloughing skin in mouth    Sulfasalazine      SUBJECTIVE:    Herlinda Aase is seen today for a follow up for Major Depressive Disorder, Generalized Anxiety Disorder and insomnia  She reports that she has done fairly well since the last visit  Patient has tolerated initiation of Effexor well  Depression has improved patient indicates there is still much room for improvement  Continues to have low motivation anhedonia low energy and difficulty with sleep  States mood many of her symptoms are low linked to chronic pain she experiences  She is exploring options with pain management doctor for steroid injections    States she typically sleeps better when not experiencing pain at night  and depression is lessened when she is able to participate in things that she enjoys  She denies any side effects from medications  PLAN:    Increase Effexor to 75 mg p o  daily   recommend individual therapy   continue working with pain management to address chronic pain   follow-up in 3 months  Aware of 24 hour and weekend coverage for urgent situations accessed by calling [facility] main practice number    Diagnoses and all orders for this visit:    Severe episode of recurrent major depressive disorder, without psychotic features ([address] )  -     venlafaxine (EFFEXOR-XR) 75 mg 24 hr capsule; Take 1 capsule (75 mg total) by mouth daily    Generalized anxiety disorder  -     venlafaxine (EFFEXOR-XR) 75 mg 24 hr capsule; Take 1 capsule (75 mg total) by mouth daily        Current Outpatient Medications on File Prior to Visit   Medication Sig Dispense Refill    al mag oxide-diphenhydramine-lidocaine viscous (MAGIC MOUTHWASH) 1:1:1 suspension Swish and spit 10 mL every 4 (four) hours as needed for mouth pain or discomfort (Patient not taking: Reported on 3/3/2021) 500 mL 1    budesonide-formoterol (Symbicort) 160-4 5 mcg/act inhaler Inhale 2 puffs 2 (two) times a day Rinse mouth after use   (Patient not taking: Reported on 3/3/2021) 10 2 Inhaler 3    butalbital-acetaminophen-caffeine (FIORICET,ESGIC) -40 mg per tablet Take 1 tablet by mouth daily as needed for headaches (Patient not taking: Reported on 3/3/2021) 90 tablet 0    famotidine (PEPCID) 20 mg tablet Take 1 tablet (20 mg total) by mouth daily (Patient not taking: Reported on 3/3/2021) 30 tablet 3    fluticasone (FLONASE) 50 mcg/act nasal spray 1 spray into each nostril daily (Patient not taking: Reported on 3/3/2021) 1 Bottle 1    lisinopril (ZESTRIL) 20 mg tablet Take 1 tablet (20 mg total) by mouth daily (Patient not taking: Reported on 3/3/2021) 90 tablet 0    methocarbamol (ROBAXIN) 500 mg tablet Take 1 tablet (500 mg total) by mouth 2 (two) times a day as needed for muscle spasms 20 tablet 0    nicotine (NICODERM CQ) 21 mg/24 hr TD 24 hr patch APPLY 1 PATCH EVERY DAY (Patient not taking: Reported on 10/8/2020) 28 patch 0    omeprazole (PriLOSEC) 40 MG capsule Take 1 capsule po in the am 30 min prior to eating prn 30 capsule 1    ondansetron (ZOFRAN) 4 mg tablet Take 1 tablet (4 mg total) by mouth daily as needed for nausea or vomiting 90 tablet 1    polyethylene glycol (GLYCOLAX) powder Take 17 g by mouth 2 (two) times a day (Patient not taking: Reported on 3/3/2021) 850 g 6    psyllium (METAMUCIL) packet Take 1 packet by mouth daily (Patient not taking: Reported on 3/3/2021) 30 packet 0    traMADol (ULTRAM) 50 mg tablet Take 1 tab po qd - bid prn pain; caution for sedation; avoid driving/working while taking; do NOT take with zofran or SSRI/SNRI 60 tablet 0    triamcinolone (KENALOG) 0 1 % oral topical paste Apply after meals and at bedtime x 14 days (Patient not taking: Reported on 3/3/2021) 5 g 1    [DISCONTINUED] venlafaxine (EFFEXOR-XR) 37 5 mg 24 hr capsule Take 1 capsule (37 5 mg total) by mouth daily 30 capsule 1     No current facility-administered medications on file prior to visit  Psychotherapy Provided:     Individual psychotherapy provided: No     HPI ROS Appetite Changes and Sleep:     She reports difficulty falling asleep, frequent awakenings, decreased appetite, recent weight 120lbs, low energy   Patient denies suicidal or homicidal ideation    Review Of Systems:      General emotional problems, sleep disturbances, appetite disturbances and decreased functioning   Personality no change in personality   Constitutional negative   ENT negative   Cardiovascular negative   Respiratory negative   Gastrointestinal negative   Genitourinary negative   Musculoskeletal negative   Integumentary negative   Neurological negative   Endocrine negative   Other Symptoms none, all other systems are negative     Mental Status Evaluation:    Appearance Adequate hygiene and grooming   Behavior cooperative   Mood depressed  Depression Scale -  of 10 (0 = No depression)  Anxiety Scale -  of 10 (0 = No anxiety)   Speech Normal rate and volume   Affect mood-congruent   Thought Processes Goal directed and coherent   Thought Content Does not verbalize delusional material   Associations Tightly connected   Perceptual Disturbances Denies hallucinations and does not appear to be responding to internal stimuli   Risk Potential Suicidal/Homicidal Ideation - No evidence of suicidal or homicidal ideation and Patient does not verbalize suicidal or homicidal ideation  Risk of Violence - No evidence of risk for violence found on assessment  Risk of Self Mutilation - No evidence of risk for self mutilation found on assessment   Orientation oriented to person, place, time/date and situation   Memory recent and remote memory grossly intact   Consciousness alert and awake   Attention/Concentration attention span and concentration are age appropriate   Insight improving   Judgement fair   Muscle Strength and Gait normal muscle strength and normal muscle tone, normal gait/station and normal balance   Motor Activity no abnormal movements   Language no difficulty naming common objects, no difficulty repeating a phrase, no difficulty writing a sentence   Fund of Knowledge adequate knowledge of current events  adequate fund of knowledge regarding past history  adequate fund of knowledge regarding vocabulary      Past Psychiatric History Update:     Inpatient Psychiatric Admission Since Last Encounter:   no  Changes to Outpatient Psychiatric Treatment Team:    no  Suicide Attempt Or Self Mutilation Since Last Encounter:   no  Incidence of Violent Behavior Since Last Encounter:   no    Traumatic History Update:     New Onset of Abuse Since Last Encounter:   no  Traumatic Events Since Last Encounter:   no    Past Medical History:    Past Medical History: Diagnosis Date    Chronic pain disorder     Depression     GERD (gastroesophageal reflux disease)     History of electroconvulsive therapy     Low back pain     Self-injurious behavior     Suicide attempt ([facility] [address] )      No past medical history pertinent negatives  Past Surgical History:   Procedure Laterality Date     SECTION      COLONOSCOPY      PANCREAS SURGERY      "pseudocysts" per patient's  Ricki Infante    [place] ESOPHAGOGASTRODUODENOSCOPY TRANSORAL DIAGNOSTIC N/A 4/10/2018    Procedure: EGD AND COLONOSCOPY;  Surgeon: Vinod Bustos MD;  [facility];   Service: Gastroenterology     Allergies   Allergen Reactions    Chantix [Varenicline]     Ibuprofen Other (See Comments)    Lyrica [Pregabalin] Other (See Comments)     bruising    Penicillins Other (See Comments)     ? hives    Sulfa Antibiotics Other (See Comments)     sloughing skin in mouth    Sulfasalazine      Substance Abuse History:    Social History     Substance and Sexual Activity   Alcohol Use Yes    Comment: vodka and beer, has decreased to occasionally     Social History     Substance and Sexual Activity   Drug Use Yes    Types: Marijuana    Comment: medical     Social History:    Social History     Socioeconomic History    Marital status: /Civil Union     Spouse name: Not on file    Number of children: Not on file    Years of education: Not on file    Highest education level: Not on file   Occupational History    Occupation: disability   Tobacco Use    Smoking status: Current Every Day Smoker     Packs/day: 1 00     Years: 35 00     Pack years: 35 00     Types: Cigarettes    Smokeless tobacco: Never Used   Vaping Use    Vaping Use: Never used   Substance and Sexual Activity    Alcohol use: Yes     Comment: vodka and beer, has decreased to occasionally    Drug use: Yes     Types: Marijuana     Comment: medical    Sexual activity: Yes     Partners: Male     Comment: PT is    Other Topics Concern    Not on file   Social History Narrative    Lives with spouse     Social Determinants of Health     Financial Resource Strain:     Difficulty of Paying Living Expenses:    Food Insecurity:     Worried About Running Out of Food in the Last Year:     [address] in the Last Year:    Transportation Needs:     Lack of Transportation (Medical):  Lack of Transportation (Non-Medical):    Physical Activity:     Days of Exercise per Week:     Minutes of Exercise per Session:    Stress:     Feeling of Stress :    Social Connections:     Frequency of Communication with Friends and Family:     Frequency of Social Gatherings with Friends and Family:     Attends Denominational Services:     Active Member of Clubs or Organizations:     Attends Club or Organization Meetings:     Marital Status:    Intimate Partner Violence:     Fear of Current or Ex-Partner:     Emotionally Abused:     Physically Abused:     Sexually Abused:      Family Psychiatric History:     Family History   Problem Relation Age of Onset    Arthritis Mother     Coronary artery disease Mother     Colon cancer Family     Parkinsonism Father     Parkinsonism Paternal Grandmother     Coronary artery disease Paternal Grandfather     Depression Neg Hx      History Review: The following portions of the patient's history were reviewed and updated as appropriate: allergies, current medications, past family history, past medical history, past social history, past surgical history and problem list     OBJECTIVE:     Vital signs in last 24 hours: There were no vitals filed for this visit  Laboratory Results: I have personally reviewed all pertinent laboratory/tests results      Suicide/Homicide Risk Assessment:    Risk of Harm to Self:   The following ratings are based on assessment at the time of the interview   Recent Specific Risk Factors include: diagnosis of depression, current depressive symptoms    Risk of Harm to Others:   The following ratings are based on assessment at the time of the interview   Recent Specific Risk Factors include: none  The following interventions are recommended: no intervention changes needed    Medications Risks/Benefits:      Risks, Benefits And Possible Side Effects Of Medications:    Discussed risks and benefits of treatment with patient including risk of suicidality, serotonin syndrome and SIADH related to treatment with antidepressants; Risk of induction of manic symptoms in certain patient populations     Controlled Medication Discussion:     Not applicable    Treatment Plan:    Due for update/Updated:   CARLITOS Barron 06/15/21    This note was shared with patient

## 2021-06-15 NOTE — TELEPHONE ENCOUNTER
Called and spoke with Ibis Pederson to schedule her follow up apt with Luz Maria Mack - she is sched for 9/19

## 2021-09-16 ENCOUNTER — TELEPHONE (OUTPATIENT)
Dept: PSYCHIATRY | Facility: CLINIC | Age: 51
End: 2021-09-16

## 2021-09-16 ENCOUNTER — TELEMEDICINE (OUTPATIENT)
Dept: PSYCHIATRY | Facility: CLINIC | Age: 51
End: 2021-09-16
Payer: COMMERCIAL

## 2021-09-16 DIAGNOSIS — F33.2 SEVERE EPISODE OF RECURRENT MAJOR DEPRESSIVE DISORDER, WITHOUT PSYCHOTIC FEATURES (HCC): Primary | ICD-10-CM

## 2021-09-16 DIAGNOSIS — G47.09 OTHER INSOMNIA: ICD-10-CM

## 2021-09-16 DIAGNOSIS — F41.1 GENERALIZED ANXIETY DISORDER: ICD-10-CM

## 2021-09-16 PROCEDURE — 99214 OFFICE O/P EST MOD 30 MIN: CPT | Performed by: NURSE PRACTITIONER

## 2021-09-16 RX ORDER — VENLAFAXINE HYDROCHLORIDE 75 MG/1
75 CAPSULE, EXTENDED RELEASE ORAL DAILY
Qty: 30 CAPSULE | Refills: 2 | Status: SHIPPED | OUTPATIENT
Start: 2021-09-16 | End: 2021-12-09

## 2021-09-16 RX ORDER — TRAZODONE HYDROCHLORIDE 50 MG/1
TABLET ORAL
Qty: 60 TABLET | Refills: 2 | Status: SHIPPED | OUTPATIENT
Start: 2021-09-16 | End: 2021-12-02 | Stop reason: SDUPTHER

## 2021-09-16 NOTE — PSYCH
Virtual Regular Visit    Problem List Items Addressed This Visit        Other    Severe episode of recurrent major depressive disorder, without psychotic features ([facility] [address] ) - Primary    Relevant Medications    traZODone (DESYREL) 50 mg tablet    venlafaxine (EFFEXOR-XR) 75 mg 24 hr capsule    Generalized anxiety disorder    Relevant Medications    traZODone (DESYREL) 50 mg tablet    venlafaxine (EFFEXOR-XR) 75 mg 24 hr capsule    Insomnia    Relevant Medications    traZODone (DESYREL) 50 mg tablet             Encounter provider CARLITOS Acevedo    Provider located at   [address]  [facility]   [address] [facility] [address]  [facility] [address] [phone]    Patient is located in the following state in which I hold an active license [place]    Recent Visits  No visits were found meeting these conditions  Showing recent visits within past 7 days and meeting all other requirements  Today's Visits  Date Type Provider Dept   09/16/21 Telemedicine CARLITOS [facility]   Showing today's visits and meeting all other requirements  Future Appointments  No visits were found meeting these conditions  Showing future appointments within next 150 days and meeting all other requirements     The patient was identified by name and date of birth  Sydni Chavez was informed that this is a telemedicine visit and that the visit is being conducted through [address] [place] [address] Now and patient was informed that this is a secure, HIPAA-compliant platform  Sydni Chavez agrees to proceed  My office door was closed  No one else was in the room  She acknowledged consent and understanding of privacy and security of the video platform  The patient has agreed to participate and understands they can discontinue the visit at any time  Patient is aware this is a billable service       HPI     Current Outpatient Medications   Medication Sig Dispense Refill    al [employer] oxide-diphenhydramine-lidocaine viscous (MAGIC MOUTHWASH) 1:1:1 suspension Swish and spit 10 mL every 4 (four) hours as needed for mouth pain or discomfort (Patient not taking: Reported on 3/3/2021) 500 mL 1    budesonide-formoterol (Symbicort) 160-4 5 mcg/act inhaler Inhale 2 puffs 2 (two) times a day Rinse mouth after use   (Patient not taking: Reported on 3/3/2021) 10 2 Inhaler 3    butalbital-acetaminophen-caffeine (FIORICET,ESGIC) -40 mg per tablet Take 1 tablet by mouth daily as needed for headaches (Patient not taking: Reported on 3/3/2021) 90 tablet 0    famotidine (PEPCID) 20 mg tablet Take 1 tablet (20 mg total) by mouth daily (Patient not taking: Reported on 3/3/2021) 30 tablet 3    fluticasone (FLONASE) 50 mcg/act nasal spray 1 spray into each nostril daily (Patient not taking: Reported on 3/3/2021) 1 Bottle 1    lisinopril (ZESTRIL) 20 mg tablet Take 1 tablet (20 mg total) by mouth daily (Patient not taking: Reported on 3/3/2021) 90 tablet 0    methocarbamol (ROBAXIN) 500 mg tablet Take 1 tablet (500 mg total) by mouth 2 (two) times a day as needed for muscle spasms 20 tablet 0    nicotine (NICODERM CQ) 21 mg/24 hr TD 24 hr patch APPLY 1 PATCH EVERY DAY (Patient not taking: Reported on 10/8/2020) 28 patch 0    omeprazole (PriLOSEC) 40 MG capsule Take 1 capsule po in the am 30 min prior to eating prn 30 capsule 1    ondansetron (ZOFRAN) 4 mg tablet Take 1 tablet (4 mg total) by mouth daily as needed for nausea or vomiting 90 tablet 1    polyethylene glycol (GLYCOLAX) powder Take 17 g by mouth 2 (two) times a day (Patient not taking: Reported on 3/3/2021) 850 g 6    psyllium (METAMUCIL) packet Take 1 packet by mouth daily (Patient not taking: Reported on 3/3/2021) 30 packet 0    traMADol (ULTRAM) 50 mg tablet Take 1 tab po qd - bid prn pain; caution for sedation; avoid driving/working while taking; do NOT take with zofran or SSRI/SNRI 60 tablet 0    traZODone (DESYREL) 50 mg tablet Take 1 or 2 tablets by mouth at bedtime as needed for sleep 60 tablet 2    triamcinolone (KENALOG) 0 1 % oral topical paste Apply after meals and at bedtime x 14 days (Patient not taking: Reported on 3/3/2021) 5 g 1    venlafaxine (EFFEXOR-XR) 75 mg 24 hr capsule Take 1 capsule (75 mg total) by mouth daily 30 capsule 2     No current facility-administered medications for this visit  Review of Systems  Video Exam    There were no vitals filed for this visit  Physical Exam   As a result of this visit, I have referred the patient for further respiratory evaluation  No    I spent 15 minutes directly with the patient during this visit  VIRTUAL VISIT DISCLAIMER    Carla Grimaldo acknowledges that she has consented to an online visit or consultation  She understands that the online visit is based solely on information provided by her, and that, in the absence of a face-to-face physical evaluation by the physician, the diagnosis she receives is both limited and provisional in terms of accuracy and completeness  This is not intended to replace a full medical face-to-face evaluation by the physician  Spruce Pinebetsy Grimaldo understands and accepts these terms  MEDICATION MANAGEMENT NOTE        [place] [id number] [employer] [facility]    Name and Date of Birth:  Carla Grimaldo 46 y o  1970 MRN: [id number]    Date of Visit: September 16, 2021    Allergies   Allergen Reactions    Chantix [Varenicline]     Ibuprofen Other (See Comments)    Lyrica [Pregabalin] Other (See Comments)     bruising    Penicillins Other (See Comments)     ? hives    Sulfa Antibiotics Other (See Comments)     sloughing skin in mouth    Sulfasalazine      SUBJECTIVE:    Indra Narvaez is seen today for a follow up for Major Depressive Disorder, Generalized Anxiety Disorder, insomnia and Chronic pain  She reports that she has experienced on and off symptoms since the last visit    Patient states that prior to 2 weeks ago she was doing well and feeling significantly less depressed  She has been participating in gardening and feeling more positive about the future  Approximately 2 weeks ago her dog  which has been a setback for her  Patient is tearful at times during the visit discussing passing of her dog  Patient states she did  her dogs remains today which was a trigger for her  Since restarting Effexor patient has seen a significant reduction in anxiety  Patient is seen individual therapist  Lydia weekly at [facility]  Counseling has been a positive experience for the patient states that she likes her counselor  Patient is also considering marriage counseling  Since last visit patient has had persistent insomnia and issues with chronic pain  Patient has been receiving injections from pain specialist and has a plan " nerve ablation" Procedure  In 1 week which is expected to relieve neck pain for period of time  Discussed risks and benefits of retrialing trazodone with the patient following washout period from sleeping medications  She denies any side effects from medications  PLAN:   continue to monitor the patient for improvement in depression following resolution of grieving for her dog  - initiate trazodone 50 mg p o  HS p r n  for sleep  Patient may take 1 or 2 tablets as needed for sleep  Discussed risks and benefits of this medication in combination with tramadol and Effexor  Patient will take  Maximum of 1 tablet of trazodone for the 1st 3 nights   - consider increase in Effexor at a future visit  - continue individual therapy  - follow-up in 6-8 weeks  Aware of 24 hour and weekend coverage for urgent situations accessed by calling [facility] main practice number    Diagnoses and all orders for this visit:    Severe episode of recurrent major depressive disorder, without psychotic features ([facility] [address] )  -     venlafaxine (EFFEXOR-XR) 75 mg 24 hr capsule;  Take 1 capsule (75 mg total) by mouth daily    Generalized anxiety disorder  -     venlafaxine (EFFEXOR-XR) 75 mg 24 hr capsule; Take 1 capsule (75 mg total) by mouth daily    Other insomnia  -     traZODone (DESYREL) 50 mg tablet; Take 1 or 2 tablets by mouth at bedtime as needed for sleep        Current Outpatient Medications on File Prior to Visit   Medication Sig Dispense Refill    al [employer] oxide-diphenhydramine-lidocaine viscous (MAGIC MOUTHWASH) 1:1:1 suspension Swish and spit 10 mL every 4 (four) hours as needed for mouth pain or discomfort (Patient not taking: Reported on 3/3/2021) 500 mL 1    budesonide-formoterol (Symbicort) 160-4 5 mcg/act inhaler Inhale 2 puffs 2 (two) times a day Rinse mouth after use   (Patient not taking: Reported on 3/3/2021) 10 2 Inhaler 3    butalbital-acetaminophen-caffeine (FIORICET,ESGIC) -40 mg per tablet Take 1 tablet by mouth daily as needed for headaches (Patient not taking: Reported on 3/3/2021) 90 tablet 0    famotidine (PEPCID) 20 mg tablet Take 1 tablet (20 mg total) by mouth daily (Patient not taking: Reported on 3/3/2021) 30 tablet 3    fluticasone (FLONASE) 50 mcg/act nasal spray 1 spray into each nostril daily (Patient not taking: Reported on 3/3/2021) 1 Bottle 1    lisinopril (ZESTRIL) 20 mg tablet Take 1 tablet (20 mg total) by mouth daily (Patient not taking: Reported on 3/3/2021) 90 tablet 0    methocarbamol (ROBAXIN) 500 mg tablet Take 1 tablet (500 mg total) by mouth 2 (two) times a day as needed for muscle spasms 20 tablet 0    nicotine (NICODERM CQ) 21 mg/24 hr TD 24 hr patch APPLY 1 PATCH EVERY DAY (Patient not taking: Reported on 10/8/2020) 28 patch 0    omeprazole (PriLOSEC) 40 MG capsule Take 1 capsule po in the am 30 min prior to eating prn 30 capsule 1    ondansetron (ZOFRAN) 4 mg tablet Take 1 tablet (4 mg total) by mouth daily as needed for nausea or vomiting 90 tablet 1    polyethylene glycol (GLYCOLAX) powder Take 17 g by mouth 2 (two) times a day (Patient not taking: Reported on 3/3/2021) 850 g 6    psyllium (METAMUCIL) packet Take 1 packet by mouth daily (Patient not taking: Reported on 3/3/2021) 30 packet 0    traMADol (ULTRAM) 50 mg tablet Take 1 tab po qd - bid prn pain; caution for sedation; avoid driving/working while taking; do NOT take with zofran or SSRI/SNRI 60 tablet 0    triamcinolone (KENALOG) 0 1 % oral topical paste Apply after meals and at bedtime x 14 days (Patient not taking: Reported on 3/3/2021) 5 g 1    [DISCONTINUED] venlafaxine (EFFEXOR-XR) 75 mg 24 hr capsule Take 1 capsule (75 mg total) by mouth daily 30 capsule 2     No current facility-administered medications on file prior to visit  Psychotherapy Provided:     Individual psychotherapy provided: Yes  Counseling was provided during the session today for 16 minutes  Supportive counseling provided  HPI ROS Appetite Changes and Sleep:     She reports difficulty falling asleep, frequent awakenings, adequate appetite, adequate energy level, low energy   Patient denies suicidal or homicidal ideation    Review Of Systems:      General emotional problems, decreased functioning, sleep disturbances and marital problems   Personality no change in personality   Constitutional negative   ENT negative   Cardiovascular negative   Respiratory negative   Gastrointestinal negative   Genitourinary negative   Musculoskeletal negative   Integumentary negative   Neurological negative   Endocrine negative   Other Symptoms none, all other systems are negative     Mental Status Evaluation:    Appearance Adequate hygiene and grooming   Behavior cooperative   Mood depressed  Depression Scale -  of 10 (0 = No depression)  Anxiety Scale -  of 10 (0 = No anxiety)   Speech Normal rate and volume   Affect mood-congruent and Tearful   Thought Processes Goal directed and coherent   Thought Content Does not verbalize delusional material   Associations Tightly connected   Perceptual Disturbances Denies hallucinations and does not appear to be responding to internal stimuli   Risk Potential Suicidal/Homicidal Ideation - No evidence of suicidal or homicidal ideation and patient does not verbalize suicidal or homicidal ideation  Risk of Violence - No evidence of risk for violence found on assessment  Risk of Self Mutilation - No evidence of risk for self mutilation found on assessment   Orientation oriented to person, place, time/date and situation   Memory recent and remote memory grossly intact   Consciousness alert and awake   Attention/Concentration attention span and concentration are age appropriate   Insight intact   Judgement intact   Muscle Strength and Gait normal muscle strength and normal muscle tone, normal gait/station and normal balance   Motor Activity no abnormal movements   Language no difficulty naming common objects, no difficulty repeating a phrase, no difficulty writing a sentence   Fund of Knowledge adequate knowledge of current events  adequate fund of knowledge regarding past history  adequate fund of knowledge regarding vocabulary      Past Psychiatric History Update:     Inpatient Psychiatric Admission Since Last Encounter:   no  Suicide Attempt Or Self Mutilation Since Last Encounter:   no  Incidence of Violent Behavior Since Last Encounter:   no    Traumatic History Update:     New Onset of Abuse Since Last Encounter:   no  Traumatic Events Since Last Encounter:   no    Past Medical History:    Past Medical History:   Diagnosis Date    Chronic pain disorder     Depression     GERD (gastroesophageal reflux disease)     History of electroconvulsive therapy     Low back pain     Self-injurious behavior     Suicide attempt ([facility] [address] )      No past medical history pertinent negatives    Past Surgical History:   Procedure Laterality Date     SECTION      COLONOSCOPY      PANCREAS SURGERY      "pseudocysts" per patient's  Ricki Olson    [place] ESOPHAGOGASTRODUODENOSCOPY TRANSORAL DIAGNOSTIC N/A 4/10/2018    Procedure: EGD AND COLONOSCOPY;  Surgeon: Nitin King MD;  [place] [facility]; Service: Gastroenterology     Allergies   Allergen Reactions    Chantix [Varenicline]     Ibuprofen Other (See Comments)    Lyrica [Pregabalin] Other (See Comments)     bruising    Penicillins Other (See Comments)     ? hives    Sulfa Antibiotics Other (See Comments)     sloughing skin in mouth    Sulfasalazine      Substance Abuse History:    Social History     Substance and Sexual Activity   Alcohol Use Yes    Comment: vodka and beer, has decreased to occasionally     Social History     Substance and Sexual Activity   Drug Use Yes    Types: Marijuana    Comment: medical     Social History:    Social History     Socioeconomic History    Marital status: /Civil Union     Spouse name: Not on file    Number of children: Not on file    Years of education: Not on file    Highest education level: Not on file   Occupational History    Occupation: disability   Tobacco Use    Smoking status: Current Every Day Smoker     Packs/day: 1 00     Years: 35 00     Pack years: 35 00     Types: Cigarettes    Smokeless tobacco: Never Used   Vaping Use    Vaping Use: Never used   Substance and Sexual Activity    Alcohol use: Yes     Comment: vodka and beer, has decreased to occasionally    Drug use: Yes     Types: Marijuana     Comment: medical    Sexual activity: Yes     Partners: Male     Comment: PT is    Other Topics Concern    Not on file   Social History Narrative    Lives with spouse     Social Determinants of Health     Financial Resource Strain:     Difficulty of Paying Living Expenses:    Food Insecurity:     Worried About Running Out of Food in the Last Year:     Ran Out of Food in the Last Year:    Transportation Needs:     Lack of Transportation (Medical):      Lack of Transportation (Non-Medical):    Physical Activity:     Days of Exercise per Week:     Minutes of Exercise per Session:    Stress:     Feeling of Stress :    Social Connections:     Frequency of Communication with Friends and Family:     Frequency of Social Gatherings with Friends and Family:     Attends [employer] Services:     Active Member of Clubs or Organizations:     Attends Club or Organization Meetings:     Marital Status:    Intimate Partner Violence:     Fear of Current or Ex-Partner:     Emotionally Abused:     Physically Abused:     Sexually Abused:      Family Psychiatric History:     Family History   Problem Relation Age of Onset    Arthritis Mother     Coronary artery disease Mother     Colon cancer Family     Parkinsonism Father     Parkinsonism Paternal Grandmother     Coronary artery disease Paternal Grandfather     Depression Neg Hx      History Review: The following portions of the patient's history were reviewed and updated as appropriate: allergies, current medications, past family history, past medical history, past social history, past surgical history and problem list     OBJECTIVE:     Vital signs in last 24 hours: There were no vitals filed for this visit  Laboratory Results: I have personally reviewed all pertinent laboratory/tests results  Suicide/Homicide Risk Assessment:    Risk of Harm to Self:   The following ratings are based on assessment at the time of the interview   Recent Specific Risk Factors include: current depressive symptoms    Risk of Harm to Others:   The following ratings are based on assessment at the time of the interview   Recent Specific Risk Factors include: none  The following interventions are recommended: no intervention changes needed    Medications Risks/Benefits:      Risks, Benefits And Possible Side Effects Of Medications:    Discussed risks and benefits of treatment with patient including risk of suicidality, serotonin syndrome and SIADH related to treatment with antidepressants;  Risk of induction of manic symptoms in certain patient populations     Controlled Medication Discussion:     Not applicable    Treatment Plan:    Due for update/Updated:   yes    CARLITOS Magaña 09/16/21    This note was shared with patient

## 2021-09-16 NOTE — BH TREATMENT PLAN
TREATMENT PLAN (Medication Management Only)        [facility]    Name and Date of Birth:  Pati Cochran 46 y o  1970  Date of Treatment Plan: September 16, 2021  Diagnosis/Diagnoses:    1  Severe episode of recurrent major depressive disorder, without psychotic features ([address] )    2  Generalized anxiety disorder    3  Other insomnia      Strengths/Personal Resources for Self-Care: motivation for treatment, taking medications as prescribed, ability to communicate well, ability to listen, ability to reason  Area/Areas of need (in own words): depressive symptoms, anxiety symptoms  1  Long Term Goal: maintain improvement in anxiety and improve depression  Target Date: 6 months - 3/16/2022  Person/Persons responsible for completion of goal: Pati Cochran  2  Short Term Objective (s) - How will we reach this goal?:   A  Provider new recommended medication/dosage changes and/or continue medication(s): continue current medications as prescribed  B   Attend medication management appointments regularly  C   Attend psychotherapy regularly  Target Date: 6 months - 3/16/2022  Person/Persons Responsible for Completion of Goal: Pati Cochran  Progress Towards Goals: continuing treatment  Treatment Modality: medication management with psychotherapy every 6 weeks  Review due 90 to 120 days from date of this plan: 6 months - 3/16/2022  Expected length of service: maintenance unless revised  My Physician/PA/NP and I have developed this plan together and I agree to work on the goals and objectives  I understand the treatment goals that were developed for my treatment    Treatment Plan Verbal Consent - Due to COVID     Virtual Visit

## 2021-09-16 NOTE — TELEPHONE ENCOUNTER
Called and left message asking patient to call our office to schedule her FU with Daniel for 6-8 weeks

## 2021-11-04 ENCOUNTER — TELEPHONE (OUTPATIENT)
Dept: PSYCHIATRY | Facility: CLINIC | Age: 51
End: 2021-11-04

## 2021-11-04 ENCOUNTER — TELEMEDICINE (OUTPATIENT)
Dept: PSYCHIATRY | Facility: CLINIC | Age: 51
End: 2021-11-04

## 2021-11-04 DIAGNOSIS — G47.09 OTHER INSOMNIA: ICD-10-CM

## 2021-11-04 DIAGNOSIS — F41.1 GENERALIZED ANXIETY DISORDER: ICD-10-CM

## 2021-11-04 DIAGNOSIS — F33.2 SEVERE EPISODE OF RECURRENT MAJOR DEPRESSIVE DISORDER, WITHOUT PSYCHOTIC FEATURES (HCC): Primary | ICD-10-CM

## 2021-11-04 PROCEDURE — NOSHOW: Performed by: NURSE PRACTITIONER

## 2021-11-04 RX ORDER — VENLAFAXINE HYDROCHLORIDE 37.5 MG/1
37.5 CAPSULE, EXTENDED RELEASE ORAL DAILY
Qty: 7 CAPSULE | Refills: 0 | Status: SHIPPED | OUTPATIENT
Start: 2021-11-04 | End: 2021-12-09

## 2021-11-04 RX ORDER — VENLAFAXINE HYDROCHLORIDE 150 MG/1
150 CAPSULE, EXTENDED RELEASE ORAL DAILY
Qty: 30 CAPSULE | Refills: 2 | Status: SHIPPED | OUTPATIENT
Start: 2021-11-04 | End: 2022-02-01 | Stop reason: SDUPTHER

## 2021-12-02 DIAGNOSIS — G47.09 OTHER INSOMNIA: ICD-10-CM

## 2021-12-02 NOTE — TELEPHONE ENCOUNTER
Patient called for refill for trazodone, she said she does not have enough to last until her 12/9 apt with Daniel

## 2021-12-03 RX ORDER — TRAZODONE HYDROCHLORIDE 50 MG/1
TABLET ORAL
Qty: 60 TABLET | Refills: 5 | Status: SHIPPED | OUTPATIENT
Start: 2021-12-03 | End: 2021-12-09

## 2021-12-09 ENCOUNTER — OFFICE VISIT (OUTPATIENT)
Dept: PSYCHIATRY | Facility: CLINIC | Age: 51
End: 2021-12-09
Payer: COMMERCIAL

## 2021-12-09 DIAGNOSIS — G47.09 OTHER INSOMNIA: ICD-10-CM

## 2021-12-09 DIAGNOSIS — F33.2 SEVERE EPISODE OF RECURRENT MAJOR DEPRESSIVE DISORDER, WITHOUT PSYCHOTIC FEATURES (HCC): Primary | ICD-10-CM

## 2021-12-09 DIAGNOSIS — F41.1 GENERALIZED ANXIETY DISORDER: ICD-10-CM

## 2021-12-09 PROBLEM — F11.20 OPIATE DEPENDENCE (HCC): Status: ACTIVE | Noted: 2021-12-09

## 2021-12-09 PROCEDURE — 99214 OFFICE O/P EST MOD 30 MIN: CPT | Performed by: NURSE PRACTITIONER

## 2021-12-09 RX ORDER — DOXEPIN HYDROCHLORIDE 10 MG/1
10 CAPSULE ORAL
Qty: 30 CAPSULE | Refills: 1 | Status: SHIPPED | OUTPATIENT
Start: 2021-12-09 | End: 2022-02-01

## 2022-01-31 DIAGNOSIS — F33.2 SEVERE EPISODE OF RECURRENT MAJOR DEPRESSIVE DISORDER, WITHOUT PSYCHOTIC FEATURES (HCC): ICD-10-CM

## 2022-01-31 DIAGNOSIS — G47.09 OTHER INSOMNIA: ICD-10-CM

## 2022-01-31 DIAGNOSIS — F41.1 GENERALIZED ANXIETY DISORDER: ICD-10-CM

## 2022-02-01 RX ORDER — VENLAFAXINE HYDROCHLORIDE 150 MG/1
150 CAPSULE, EXTENDED RELEASE ORAL DAILY
Qty: 30 CAPSULE | Refills: 2 | Status: SHIPPED | OUTPATIENT
Start: 2022-02-01 | End: 2022-04-21 | Stop reason: SDUPTHER

## 2022-02-01 RX ORDER — DOXEPIN HYDROCHLORIDE 10 MG/1
CAPSULE ORAL
Qty: 30 CAPSULE | Refills: 2 | Status: SHIPPED | OUTPATIENT
Start: 2022-02-01 | End: 2022-04-21 | Stop reason: SDUPTHER

## 2022-02-04 ENCOUNTER — TELEPHONE (OUTPATIENT)
Dept: PSYCHIATRY | Facility: CLINIC | Age: 52
End: 2022-02-04

## 2022-02-04 NOTE — TELEPHONE ENCOUNTER
Patient was looking to r/s her missed appointment with Teresa Daugherty and to get a refill on her medication       Unable to call back as there was no answer and no voicemail

## 2022-02-06 DIAGNOSIS — K21.00 ESOPHAGITIS, REFLUX: ICD-10-CM

## 2022-02-06 RX ORDER — FAMOTIDINE 20 MG/1
TABLET, FILM COATED ORAL
Qty: 30 TABLET | Refills: 0 | Status: SHIPPED | OUTPATIENT
Start: 2022-02-06 | End: 2022-05-25 | Stop reason: ALTCHOICE

## 2022-02-17 ENCOUNTER — TELEPHONE (OUTPATIENT)
Dept: PHYSICAL THERAPY | Facility: OTHER | Age: 52
End: 2022-02-17

## 2022-02-17 NOTE — TELEPHONE ENCOUNTER
Called patient per online request and VM left today  Voice message left for patient to call back  Phone number and hours of business provided

## 2022-02-17 NOTE — TELEPHONE ENCOUNTER
Called patient per online appt request she filled out  Voice message left for patient to call back  Phone number and hours of business provided

## 2022-03-11 ENCOUNTER — DOCUMENTATION (OUTPATIENT)
Dept: NEUROSURGERY | Facility: CLINIC | Age: 52
End: 2022-03-11

## 2022-04-01 ENCOUNTER — TELEPHONE (OUTPATIENT)
Dept: PSYCHIATRY | Facility: CLINIC | Age: 52
End: 2022-04-01

## 2022-04-01 NOTE — TELEPHONE ENCOUNTER
Returned patient's call  She wanted to make sure Hoangmendoza Kathleen is aware that she did not want to miss this appointment however, she couldn't take the car and she has no computer  Informed her that I will make him aware  Will refer to Yin Harris for his information

## 2022-04-01 NOTE — TELEPHONE ENCOUNTER
Genoveva Crane called and rescheduled her appointment for today with Karen Alcaraz  Appointment was r/s to 4/11 at [facility], she is also has a private message she would like to leave Jeremy day  Can you please give her a call back?

## 2022-04-11 ENCOUNTER — OFFICE VISIT (OUTPATIENT)
Dept: NEUROSURGERY | Facility: CLINIC | Age: 52
End: 2022-04-11
Payer: COMMERCIAL

## 2022-04-11 VITALS
TEMPERATURE: 98.7 F | RESPIRATION RATE: 16 BRPM | BODY MASS INDEX: 23.14 KG/M2 | SYSTOLIC BLOOD PRESSURE: 158 MMHG | HEIGHT: 66 IN | DIASTOLIC BLOOD PRESSURE: 92 MMHG | OXYGEN SATURATION: 97 % | HEART RATE: 91 BPM | WEIGHT: 144 LBS

## 2022-04-11 DIAGNOSIS — M54.50 LOWER BACK PAIN: ICD-10-CM

## 2022-04-11 DIAGNOSIS — G89.4 CHRONIC PAIN SYNDROME: ICD-10-CM

## 2022-04-11 DIAGNOSIS — M54.16 LUMBAR RADICULOPATHY: Primary | ICD-10-CM

## 2022-04-11 DIAGNOSIS — M41.80 DEGENERATIVE SCOLIOSIS IN ADULT PATIENT: ICD-10-CM

## 2022-04-11 PROCEDURE — 99203 OFFICE O/P NEW LOW 30 MIN: CPT | Performed by: STUDENT IN AN ORGANIZED HEALTH CARE EDUCATION/TRAINING PROGRAM

## 2022-04-11 RX ORDER — DICLOFENAC SODIUM 75 MG/1
75 TABLET, DELAYED RELEASE ORAL 2 TIMES DAILY
COMMUNITY
Start: 2022-03-16

## 2022-04-11 RX ORDER — TAPENTADOL HYDROCHLORIDE 50 MG/1
50 TABLET, FILM COATED ORAL 3 TIMES DAILY PRN
COMMUNITY
Start: 2022-03-19

## 2022-04-11 RX ORDER — TIZANIDINE 4 MG/1
4 TABLET ORAL 2 TIMES DAILY PRN
COMMUNITY
Start: 2022-03-25 | End: 2022-05-25 | Stop reason: ALTCHOICE

## 2022-04-11 RX ORDER — GABAPENTIN 300 MG/1
300 CAPSULE ORAL 4 TIMES DAILY
COMMUNITY
Start: 2022-03-25

## 2022-04-11 NOTE — PROGRESS NOTES
Office Note - Neurosurgery   Devin Zhang 46 y o  female MRN: [id number]      Assessment and Plan: This is a 57-year-old female presenting with chronic low back and right leg pain  MRI of her lumbar spine demonstrates diffuse degeneration most prominent at L3-4, L4-5 and L5-S1  At L3-4 she has a broad-based disc herniation causing moderate central canal stenosis  At L4-5 and L5-S1 the patient has on even disc degeneration which is causing severe right-sided foraminal stenoses and moderate left-sided stenosis at L5-S1 on the left  The patient has also developed mild dextroscoliosis  I had a long discussion with the patient and her  about her symptoms as well as imaging findings  I believe the patient's symptoms are still likely stemming from the disease at L3-S1  We discussed treatment options  Since the patient has failed conservative management efforts, we discuss surgical intervention  We discussed foraminotomies at L4-5 and L5-S1 to treat the right leg pain versus L3-S1 fusion with interbodies at L4-5 and L5-S1  Given that the patient is developing scoliosis, I do not believe foraminotomies would provide the patient with long-term relief as a significant amount of her joints will have to be removed in order to decompress the nerve root foramen  This would only destabilize her spine further and worsen her scoliosis and create more low back pain for her  As such my recommendation would be L3-S1 fusion  We went over the procedure as well as the risks and benefits  The patient and her  at this point are not ready to decide on surgery and would like to think it over before making a decision  They will come back on a p r n  basis once they have made a decision  Upon return, I would like to obtain standing scoliosis films of the patient to evaluate her entire alignment  History, physical examination and diagnostic tests were reviewed and questions answered   Diagnosis, care plan and treatment options were discussed  The patient and spouse/SO understand instructions and will follow up as directed  Follow-up: PRN, scoliosis films upon follow up    Diagnoses and all orders for this visit:    Lumbar radiculopathy  -     XR entire spine (scoliosis) 4-5 vw; Future    Lower back pain  -     Ambulatory Referral to Neurosurgery  -     XR entire spine (scoliosis) 4-5 vw; Future    Chronic pain syndrome  -     XR entire spine (scoliosis) 4-5 vw; Future    Degenerative scoliosis in adult patient  -     XR entire spine (scoliosis) 4-5 vw; Future    Other orders  -     diclofenac (VOLTAREN) 75 mg EC tablet  -     gabapentin (NEURONTIN) 300 mg capsule; Take 300 mg by mouth 3 (three) times a day  -     Nucynta 50 MG tablet; Take 50 mg by mouth 3 (three) times a day as needed  -     tiZANidine (ZANAFLEX) 4 mg tablet; Take 4 mg by mouth 2 (two) times a day as needed        Subjective/Objective     Chief Complaint    Low back pain and right leg pain    HPI    This is a 75-year-old female with chronic low back pain and right leg pain presenting for surgical consultation  The patient states she has had her symptoms for over a decade  Her primary symptoms are right sided pain that radiates into her right buttock and down the posterior lateral aspect of her thigh  She states in the beginning of her symptoms, she used to have pain that would radiate into her anterior leg as well as into her foot  Occasionally she would get shocking sensations down to her foot along with numbness and tingling  She has had many injections in the past and those seem to have helped with the pain going into her foot  Now she mostly has the deep right buttock pain as well as the posterior lateral thigh pain  Occasionally she will get sharp jabbing and stabbing pain in the lateral aspect of her thigh  She reports numbness along the anterior aspect of her thigh currently    She also reports having low back pain which goes across her entire back although the right buttock pain is the worst of her symptoms  The pain is present when the patient stands for more than 3 minutes  She is also not able to sit for prolonged period of time without having to shift positions or stand up  Overall she is very uncomfortable  The pain is affecting every aspect of her life  She has had many epidural steroid injections as well as foraminal injections all which seemed to help for short period of time  She has also had ablation therapy which has not helped  Her last injection and ablation therapy were in December and January respectively  She is here to discuss her surgical options  MARY HERNANDEZ personally reviewed and updated  Review of Systems   Constitutional: Negative  HENT: Negative  Eyes: Negative  Respiratory:        Chronic brontchitis   Cardiovascular: Negative  Gastrointestinal: Negative  Pain at times when moving bowels   Endocrine: Negative  Genitourinary: Negative  Musculoskeletal: Positive for back pain (LBP into right leg down back to knee) and gait problem (prolonged standing is difficult with pain)  Rhizotomy 1/19/2022  12/3/2021 last injection  PT  PM through comprehensive pain   Allergic/Immunologic: Negative  Neurological: Positive for weakness (right leg) and numbness (right upper thigh/outside with now moving into groin)  Psychiatric/Behavioral: Negative          Family History    Family History   Problem Relation Age of Onset    Arthritis Mother     Coronary artery disease Mother     Colon cancer Family     Parkinsonism Father     Parkinsonism Paternal Grandmother     Coronary artery disease Paternal Grandfather     Depression Neg Hx        Social History    Social History     Socioeconomic History    Marital status: /Civil Union     Spouse name: Not on file    Number of children: Not on file    Years of education: Not on file    Highest education level: Not on file Occupational History    Occupation: disability   Tobacco Use    Smoking status: Current Every Day Smoker     Packs/day: 1 00     Years: 35 00     Pack years: 35 00     Types: Cigarettes    Smokeless tobacco: Never Used   Vaping Use    Vaping Use: Never used   Substance and Sexual Activity    Alcohol use: Yes     Comment: vodka and beer, has decreased to occasionally    Drug use: Yes     Types: Marijuana     Comment: medical    Sexual activity: Yes     Partners: Male     Comment: PT is    Other Topics Concern    Not on file   Social History Narrative    Lives with spouse     Social Determinants of Health     Financial Resource Strain: Not on file   Food Insecurity: Not on file   Transportation Needs: Not on file   Physical Activity: Not on file   Stress: Not on file   Social Connections: Not on file   Intimate Partner Violence: Not on file   Housing Stability: Not on file       Past Medical History    Past Medical History:   Diagnosis Date    Chronic pain disorder     Depression     GERD (gastroesophageal reflux disease)     History of electroconvulsive therapy     Low back pain     Self-injurious behavior     Suicide attempt ([address] )        Surgical History    Past Surgical History:   Procedure Laterality Date     SECTION      COLONOSCOPY      PANCREAS SURGERY      "pseudocysts" per patient's  Ricki Antwander[place]cliff    [place] ESOPHAGOGASTRODUODENOSCOPY TRANSORAL DIAGNOSTIC N/A 4/10/2018    Procedure: EGD AND COLONOSCOPY;  Surgeon: Nicol Morris MD;  [facility]; Service: Gastroenterology       Medications      Current Outpatient Medications:     doxepin (SINEquan) 10 mg capsule, take one capsule (10mg total) by mouth daily at bedtime  , Disp: 30 capsule, Rfl: 2    omeprazole (PriLOSEC) 40 MG capsule, Take 1 capsule po in the am 30 min prior to eating prn, Disp: 30 capsule, Rfl: 1    venlafaxine (EFFEXOR-XR) 150 mg 24 hr capsule, Take 1 capsule (150 mg total) by mouth daily, Disp: 30 capsule, Rfl: 2    al mag oxide-diphenhydramine-lidocaine viscous (MAGIC MOUTHWASH) 1:1:1 suspension, Swish and spit 10 mL every 4 (four) hours as needed for mouth pain or discomfort (Patient not taking: Reported on 3/3/2021), Disp: 500 mL, Rfl: 1    budesonide-formoterol (Symbicort) 160-4 5 mcg/act inhaler, Inhale 2 puffs 2 (two) times a day Rinse mouth after use  (Patient not taking: Reported on 3/3/2021), Disp: 10 2 Inhaler, Rfl: 3    butalbital-acetaminophen-caffeine (FIORICET,ESGIC) -40 mg per tablet, Take 1 tablet by mouth daily as needed for headaches (Patient not taking: Reported on 3/3/2021), Disp: 90 tablet, Rfl: 0    diclofenac (VOLTAREN) 75 mg EC tablet, , Disp: , Rfl:     famotidine (PEPCID) 20 mg tablet, take one tablet (20mg total) by mouth daily  , Disp: 30 tablet, Rfl: 0    fluticasone (FLONASE) 50 mcg/act nasal spray, 1 spray into each nostril daily (Patient not taking: Reported on 3/3/2021), Disp: 1 Bottle, Rfl: 1    gabapentin (NEURONTIN) 300 mg capsule, Take 300 mg by mouth 3 (three) times a day, Disp: , Rfl:     lisinopril (ZESTRIL) 20 mg tablet, Take 1 tablet (20 mg total) by mouth daily (Patient not taking: Reported on 3/3/2021), Disp: 90 tablet, Rfl: 0    methocarbamol (ROBAXIN) 500 mg tablet, Take 1 tablet (500 mg total) by mouth 2 (two) times a day as needed for muscle spasms (Patient not taking: Reported on 4/11/2022 ), Disp: 20 tablet, Rfl: 0    nicotine (NICODERM CQ) 21 mg/24 hr TD 24 hr patch, APPLY 1 PATCH EVERY DAY (Patient not taking: Reported on 10/8/2020), Disp: 28 patch, Rfl: 0    Nucynta 50 MG tablet, Take 50 mg by mouth 3 (three) times a day as needed, Disp: , Rfl:     ondansetron (ZOFRAN) 4 mg tablet, Take 1 tablet (4 mg total) by mouth daily as needed for nausea or vomiting, Disp: 90 tablet, Rfl: 1    polyethylene glycol (GLYCOLAX) powder, Take 17 g by mouth 2 (two) times a day (Patient not taking: Reported on 3/3/2021), Disp: 850 g, Rfl: 6    psyllium (METAMUCIL) packet, Take 1 packet by mouth daily (Patient not taking: Reported on 3/3/2021), Disp: 30 packet, Rfl: 0    tiZANidine (ZANAFLEX) 4 mg tablet, Take 4 mg by mouth 2 (two) times a day as needed, Disp: , Rfl:     traMADol (ULTRAM) 50 mg tablet, Take 1 tab po qd - bid prn pain; caution for sedation; avoid driving/working while taking; do NOT take with zofran or SSRI/SNRI (Patient not taking: Reported on 4/11/2022 ), Disp: 60 tablet, Rfl: 0    triamcinolone (KENALOG) 0 1 % oral topical paste, Apply after meals and at bedtime x 14 days (Patient not taking: Reported on 3/3/2021), Disp: 5 g, Rfl: 1    Allergies    Allergies   Allergen Reactions    Chantix [Varenicline]     Ibuprofen Other (See Comments)    Lyrica [Pregabalin] Other (See Comments)     bruising    Penicillins Other (See Comments)     ? hives    Sulfa Antibiotics Other (See Comments)     sloughing skin in mouth    Sulfasalazine        The following portions of the patient's history were reviewed and updated as appropriate: allergies, current medications, past family history, past medical history, past social history, past surgical history and problem list     Investigations    I personally reviewed the MRI results with the patient:      Physical Exam    Vitals:  Blood pressure 158/92, pulse 91, temperature 98 7 °F (37 1 °C), temperature source Tympanic, resp  rate 16, height 5' 6" (1 676 m), weight 65 3 kg (144 lb), last menstrual period 03/14/2019, SpO2 97 %  ,Body mass index is 23 24 kg/m²      General:  Normal, well developed, not in distress/pain     Skin:   No issues, no rashes noted     Musculoskeletal:   5/5 strength throughout all muscle groups  No tenderness to palpation of the spine       Neurologic Exam:  Awake and alert  Oriented x3  Speech clear and fluent  ATKINSON   Sensation to light touch and pin prick intact throughout  No vaughn's  No clonus  2+ patellar reflexes     Gait:   normal gait, normal posture

## 2022-04-20 ENCOUNTER — TELEPHONE (OUTPATIENT)
Dept: PSYCHIATRY | Facility: CLINIC | Age: 52
End: 2022-04-20

## 2022-04-20 ENCOUNTER — TELEPHONE (OUTPATIENT)
Dept: NEUROSURGERY | Facility: CLINIC | Age: 52
End: 2022-04-20

## 2022-04-20 NOTE — TELEPHONE ENCOUNTER
Received a call from the patient's , Ricki, reporting the patient is experiencing increasing pain in her back  He inquired if our office could prescribe pain medications prior to surgery  Reviewed our office protocol how we do not typically prescribe pain medications prior to surgery and how we typically only prescribe pain medications up to 6 weeks after surgery  Ricki will contact the patient's pain management specialist or PCP  Reviewed red flag signs/symptoms including intractable pain, saddles anesthesia, urinary or bowel incontinence, or new weakness  Reviewed him of patient's upcoming appointment and the need for xrays prior to the appointment  He expressed understanding and was appreciative

## 2022-04-20 NOTE — TELEPHONE ENCOUNTER
Writer outreached to Lisa Ren to notify her that her appointment on 4/22 with Mike Rabago has been cancelled as Darin Burgess will not be in the office that day   Requested a call back to r/s

## 2022-04-21 ENCOUNTER — HOSPITAL ENCOUNTER (OUTPATIENT)
Dept: RADIOLOGY | Facility: HOSPITAL | Age: 52
Discharge: HOME | End: 2022-04-21
Attending: STUDENT IN AN ORGANIZED HEALTH CARE EDUCATION/TRAINING PROGRAM
Payer: COMMERCIAL

## 2022-04-21 DIAGNOSIS — F33.2 SEVERE EPISODE OF RECURRENT MAJOR DEPRESSIVE DISORDER, WITHOUT PSYCHOTIC FEATURES (HCC): ICD-10-CM

## 2022-04-21 DIAGNOSIS — M54.16 LUMBAR RADICULOPATHY: ICD-10-CM

## 2022-04-21 DIAGNOSIS — F41.1 GENERALIZED ANXIETY DISORDER: ICD-10-CM

## 2022-04-21 DIAGNOSIS — G89.4 CHRONIC PAIN SYNDROME: ICD-10-CM

## 2022-04-21 DIAGNOSIS — G47.09 OTHER INSOMNIA: ICD-10-CM

## 2022-04-21 DIAGNOSIS — M54.50 LOWER BACK PAIN: ICD-10-CM

## 2022-04-21 DIAGNOSIS — M41.80 DEGENERATIVE SCOLIOSIS IN ADULT PATIENT: ICD-10-CM

## 2022-04-21 PROCEDURE — 72082 X-RAY EXAM ENTIRE SPI 2/3 VW: CPT

## 2022-04-21 RX ORDER — VENLAFAXINE HYDROCHLORIDE 150 MG/1
150 CAPSULE, EXTENDED RELEASE ORAL DAILY
Qty: 30 CAPSULE | Refills: 2 | Status: SHIPPED | OUTPATIENT
Start: 2022-04-21 | End: 2022-05-19

## 2022-04-21 RX ORDER — DOXEPIN HYDROCHLORIDE 10 MG/1
CAPSULE ORAL
Qty: 30 CAPSULE | Refills: 2 | Status: SHIPPED | OUTPATIENT
Start: 2022-04-21 | End: 2022-05-19

## 2022-04-21 NOTE — TELEPHONE ENCOUNTER
Called patient left message to rescheduled appointment that was cancel due to provider not in office

## 2022-04-25 ENCOUNTER — OFFICE VISIT (OUTPATIENT)
Dept: NEUROSURGERY | Facility: CLINIC | Age: 52
End: 2022-04-25
Payer: COMMERCIAL

## 2022-04-25 VITALS
DIASTOLIC BLOOD PRESSURE: 86 MMHG | BODY MASS INDEX: 23.14 KG/M2 | SYSTOLIC BLOOD PRESSURE: 158 MMHG | WEIGHT: 144 LBS | HEART RATE: 102 BPM | TEMPERATURE: 98.1 F | RESPIRATION RATE: 16 BRPM | HEIGHT: 66 IN

## 2022-04-25 DIAGNOSIS — M54.41 CHRONIC BILATERAL LOW BACK PAIN WITH RIGHT-SIDED SCIATICA: Primary | ICD-10-CM

## 2022-04-25 DIAGNOSIS — M41.80 DEGENERATIVE SCOLIOSIS IN ADULT PATIENT: ICD-10-CM

## 2022-04-25 DIAGNOSIS — G89.4 CHRONIC PAIN SYNDROME: ICD-10-CM

## 2022-04-25 DIAGNOSIS — G89.29 CHRONIC BILATERAL LOW BACK PAIN WITH RIGHT-SIDED SCIATICA: Primary | ICD-10-CM

## 2022-04-25 PROCEDURE — 3008F BODY MASS INDEX DOCD: CPT | Performed by: STUDENT IN AN ORGANIZED HEALTH CARE EDUCATION/TRAINING PROGRAM

## 2022-04-25 PROCEDURE — 4004F PT TOBACCO SCREEN RCVD TLK: CPT | Performed by: STUDENT IN AN ORGANIZED HEALTH CARE EDUCATION/TRAINING PROGRAM

## 2022-04-25 PROCEDURE — 99213 OFFICE O/P EST LOW 20 MIN: CPT | Performed by: STUDENT IN AN ORGANIZED HEALTH CARE EDUCATION/TRAINING PROGRAM

## 2022-04-25 NOTE — PROGRESS NOTES
Office Note - Neurosurgery   Vidhya Pettit 46 y o  female MRN: [id number]      Assessment and Plan: This is a 23-year-old female with history of chronic low back and right leg pain presenting for follow-up visit  MRI of her lumbar spine again was reviewed with the patient  It shows diffuse degeneration of her lumbar spine most specifically at L3-4 L4-5 and L5-S1  At L4-5 and L5-S1 the patient has developed disc protrusions paracentral to the right with associated right foraminal stenosis  She also presenting with scoliosis films which demonstrate dextroscoliosis measuring approximately 36°  The patient has a coronal imbalance of 1 5 cm  She is sagittally balanced  I had a long discussion with the patient about her symptoms as well as imaging findings  I believe her symptoms are due to combination of stenosis as well as her scoliosis  In comparison to the patient's MRI, it appears her scoliosis worsens when she is upright  She also had a CT abdomen pelvis performed in 2020 which demonstrated very mild scoliosis  There does appear to have been significant progression of her disease  We discussed treatment options including performing decompression and fusion just at L4-5 and L5-S1 however this would not address the patient's scoliosis  We discussed scoliosis correction surgery and I made the patient aware of how complex the surgery is and the risks involved  Given that she has not had recent physical therapy, I believe pursuing this prior to proceeding with any surgery is warranted  I also advised the patient to obtain further injections specifically at the right L4-5 and L5-S1 to see if she gets relief from this  The patient smokes 1 pack per day and I made her aware that while she continues to smoke especially to this degree, surgical intervention will be withheld due to risk of fusion failure    We discussed tobacco cessation as the patient is going to reach out establish primary care in order to help with this  I will be given her referral to physical therapy and referral to Pain Management  I will see her in 2 months  History, physical examination and diagnostic tests were reviewed and questions answered  Diagnosis, care plan and treatment options were discussed  The patient and spouse/SO understand instructions and will follow up as directed  Follow-up: 2 months    Diagnoses and all orders for this visit:    Chronic bilateral low back pain with right-sided sciatica  -     Ambulatory Referral to Physical Therapy; Future  -     Ambulatory Referral to Pain Management; Future    Degenerative scoliosis in adult patient  -     Ambulatory Referral to Physical Therapy; Future  -     Ambulatory Referral to Pain Management; Future    Chronic pain syndrome  -     Ambulatory Referral to Physical Therapy; Future  -     Ambulatory Referral to Pain Management; Future        Subjective/Objective     Chief Complaint    Low back pain    HPI    This is a 75-year-old female with history of chronic low back pain presenting for follow-up visit  The patient's history is well detailed in our previous clinic note  In brief, she has had chronic low back and right leg pain for many years  The patient has tried many conservative management efforts and has not received significant benefit  During our last clinic visit, we discussed surgical treatment options and the patient and her  wanted to think it over prior to proceeding  In the interim she obtain standing scoliosis films and is here to review that as well  Overall her symptoms are unchanged from prior  She states she is debilitated as she is not able to perform any of her usual activities without being in pain  She stated this morning she could not brush her teeth without wanting to laid back down in bed  MARY HERNANDEZ personally reviewed and updated  Review of Systems   Constitutional: Negative  HENT: Negative  Eyes: Negative      Respiratory: Chronic brontchitis   Cardiovascular: Negative  Gastrointestinal: Negative  Pain at times when moving bowels   Endocrine: Negative  Genitourinary: Negative  Musculoskeletal: Positive for back pain (LBP into right leg shooting down back to knee) and gait problem (prolonged standing is difficult with pain)  Discussed foraminotomies at L4-5 and L5-S1 to treat the right leg pain versus L3-S1 fusion with interbodies at L4-5 and L5-S1  Rhizotomy 1/19/2022  12/3/2021 last injection  PT  PM through comprehensive pain   Allergic/Immunologic: Negative  Neurological: Positive for weakness (right leg) and numbness (right upper thigh/outside with now moving into groin)  Psychiatric/Behavioral: Negative          Family History    Family History   Problem Relation Age of Onset    Arthritis Mother     Coronary artery disease Mother     Colon cancer Family     Parkinsonism Father     Parkinsonism Paternal Grandmother     Coronary artery disease Paternal Grandfather     Depression Neg Hx        Social History    Social History     Socioeconomic History    Marital status: /Civil Union     Spouse name: Not on file    Number of children: Not on file    Years of education: Not on file    Highest education level: Not on file   Occupational History    Occupation: disability   Tobacco Use    Smoking status: Current Every Day Smoker     Packs/day: 1 00     Years: 35 00     Pack years: 35 00     Types: Cigarettes    Smokeless tobacco: Never Used   Vaping Use    Vaping Use: Never used   Substance and Sexual Activity    Alcohol use: Yes     Comment: vodka and beer, has decreased to occasionally    Drug use: Yes     Types: Marijuana     Comment: medical    Sexual activity: Yes     Partners: Male     Comment: PT is    Other Topics Concern    Not on file   Social History Narrative    Lives with spouse     Social Determinants of Health     Financial Resource Strain: Not on file Food Insecurity: Not on file   Transportation Needs: Not on file   Physical Activity: Not on file   Stress: Not on file   Social Connections: Not on file   Intimate Partner Violence: Not on file   Housing Stability: Not on file       Past Medical History    Past Medical History:   Diagnosis Date    Chronic pain disorder     Depression     GERD (gastroesophageal reflux disease)     History of electroconvulsive therapy     Low back pain     Self-injurious behavior     [facility] attempt [facility])        Surgical History    Past Surgical History:   Procedure Laterality Date     SECTION      COLONOSCOPY      PANCREAS SURGERY      "pseudocysts" per patient's  Ricki Olson    [place] ESOPHAGOGASTRODUODENOSCOPY TRANSORAL DIAGNOSTIC N/A 4/10/2018    Procedure: EGD AND COLONOSCOPY;  Surgeon: Jatinder Christy MD;  [facility]; Service: Gastroenterology       Medications      Current Outpatient Medications:     al mag oxide-diphenhydramine-lidocaine viscous (MAGIC MOUTHWASH) 1:1:1 suspension, Swish and spit 10 mL every 4 (four) hours as needed for mouth pain or discomfort (Patient not taking: Reported on 3/3/2021), Disp: 500 mL, Rfl: 1    budesonide-formoterol (Symbicort) 160-4 5 mcg/act inhaler, Inhale 2 puffs 2 (two) times a day Rinse mouth after use  (Patient not taking: Reported on 3/3/2021), Disp: 10 2 Inhaler, Rfl: 3    butalbital-acetaminophen-caffeine (FIORICET,ESGIC) -40 mg per tablet, Take 1 tablet by mouth daily as needed for headaches (Patient not taking: Reported on 3/3/2021), Disp: 90 tablet, Rfl: 0    diclofenac (VOLTAREN) 75 mg EC tablet, , Disp: , Rfl:     doxepin (SINEquan) 10 mg capsule, Take 1 HS PRN for sleep , Disp: 30 capsule, Rfl: 2    famotidine (PEPCID) 20 mg tablet, take one tablet (20mg total) by mouth daily  , Disp: 30 tablet, Rfl: 0    fluticasone (FLONASE) 50 mcg/act nasal spray, 1 spray into each nostril daily (Patient not taking: Reported on 3/3/2021), Disp: 1 Bottle, Rfl: 1    gabapentin (NEURONTIN) 300 mg capsule, Take 300 mg by mouth 3 (three) times a day, Disp: , Rfl:     lisinopril (ZESTRIL) 20 mg tablet, Take 1 tablet (20 mg total) by mouth daily (Patient not taking: Reported on 3/3/2021), Disp: 90 tablet, Rfl: 0    methocarbamol (ROBAXIN) 500 mg tablet, Take 1 tablet (500 mg total) by mouth 2 (two) times a day as needed for muscle spasms (Patient not taking: Reported on 4/11/2022 ), Disp: 20 tablet, Rfl: 0    nicotine (NICODERM CQ) 21 mg/24 hr TD 24 hr patch, APPLY 1 PATCH EVERY DAY (Patient not taking: Reported on 10/8/2020), Disp: 28 patch, Rfl: 0    Nucynta 50 MG tablet, Take 50 mg by mouth 3 (three) times a day as needed, Disp: , Rfl:     omeprazole (PriLOSEC) 40 MG capsule, Take 1 capsule po in the am 30 min prior to eating prn, Disp: 30 capsule, Rfl: 1    ondansetron (ZOFRAN) 4 mg tablet, Take 1 tablet (4 mg total) by mouth daily as needed for nausea or vomiting, Disp: 90 tablet, Rfl: 1    polyethylene glycol (GLYCOLAX) powder, Take 17 g by mouth 2 (two) times a day (Patient not taking: Reported on 3/3/2021), Disp: 850 g, Rfl: 6    psyllium (METAMUCIL) packet, Take 1 packet by mouth daily (Patient not taking: Reported on 3/3/2021), Disp: 30 packet, Rfl: 0    tiZANidine (ZANAFLEX) 4 mg tablet, Take 4 mg by mouth 2 (two) times a day as needed, Disp: , Rfl:     traMADol (ULTRAM) 50 mg tablet, Take 1 tab po qd - bid prn pain; caution for sedation; avoid driving/working while taking; do NOT take with zofran or SSRI/SNRI (Patient not taking: Reported on 4/11/2022 ), Disp: 60 tablet, Rfl: 0    triamcinolone (KENALOG) 0 1 % oral topical paste, Apply after meals and at bedtime x 14 days (Patient not taking: Reported on 3/3/2021), Disp: 5 g, Rfl: 1    venlafaxine (EFFEXOR-XR) 150 mg 24 hr capsule, Take 1 capsule (150 mg total) by mouth daily, Disp: 30 capsule, Rfl: 2    Allergies    Allergies   Allergen Reactions    Chantix [Varenicline]     Ibuprofen Other (See Comments)    Lyrica [Pregabalin] Other (See Comments)     bruising    Penicillins Other (See Comments)     ? hives    Sulfa Antibiotics Other (See Comments)     sloughing skin in mouth    Sulfasalazine        The following portions of the patient's history were reviewed and updated as appropriate: allergies, current medications, past family history, past medical history, past social history, past surgical history and problem list     Investigations    I personally reviewed the MRI and XRAY results with the patient:      Physical Exam    Vitals:  Blood pressure 158/86, pulse 102, temperature 98 1 °F (36 7 °C), temperature source Tympanic, resp  rate 16, height 5' 6" (1 676 m), weight 65 3 kg (144 lb), last menstrual period 03/14/2019 ,Body mass index is 23 24 kg/m²      General:  Normal, well developed, not in distress/pain     Skin:   No issues, no rashes noted     Musculoskeletal:   5/5 strength throughout all muscle groups  No tenderness to palpation of the spine       Neurologic Exam:  Awake and alert  Oriented x3  Speech clear and fluent  ATKINSON   Sensation to light touch and pin prick intact throughout  No vaughn's  No clonus  2+ patellar reflexes     Gait:   normal gait, normal posture

## 2022-05-12 ENCOUNTER — EVALUATION (OUTPATIENT)
Dept: PHYSICAL THERAPY | Facility: REHABILITATION | Age: 52
End: 2022-05-12
Payer: COMMERCIAL

## 2022-05-12 DIAGNOSIS — G89.29 CHRONIC BILATERAL LOW BACK PAIN WITH RIGHT-SIDED SCIATICA: Primary | ICD-10-CM

## 2022-05-12 DIAGNOSIS — M41.80 DEGENERATIVE SCOLIOSIS IN ADULT PATIENT: ICD-10-CM

## 2022-05-12 DIAGNOSIS — G89.4 CHRONIC PAIN SYNDROME: ICD-10-CM

## 2022-05-12 DIAGNOSIS — M54.41 CHRONIC BILATERAL LOW BACK PAIN WITH RIGHT-SIDED SCIATICA: Primary | ICD-10-CM

## 2022-05-12 PROCEDURE — 97110 THERAPEUTIC EXERCISES: CPT | Performed by: PHYSICAL THERAPIST

## 2022-05-12 PROCEDURE — 97161 PT EVAL LOW COMPLEX 20 MIN: CPT | Performed by: PHYSICAL THERAPIST

## 2022-05-12 NOTE — PROGRESS NOTES
PT Evaluation  and PT Discharge    Patient has violated attendance policy with 3 consecutive late cancel/no shows  Left patient message and instructed her that if further care is required she will need to follow up with physician and obtain new script  Patient is discharged at this time  Today's date: 2022   Patient name: Cristal Braden  : 1970  MRN: [id number]  Referring provider: Luis Templeton MD  Dx:   Encounter Diagnosis     ICD-10-CM    1  Chronic bilateral low back pain with right-sided sciatica  M54 41 Ambulatory Referral to Physical Therapy    G89 29    2  Degenerative scoliosis in adult patient  M41 80 Ambulatory Referral to Physical Therapy   3  Chronic pain syndrome  G89 4 Ambulatory Referral to Physical Therapy       Start Time:   Stop Time:   Total time in clinic (min): 35 minutes    Assessment  Assessment details: Patient is a 46 y o  female presenting to initial examination with chief complaint of low back pain and radicular R LE pain and sensory changes  Neurological screen notable for diminished R L4 DTR, R L4 sensory changes, and R L4 myotomal strength deficits  Signs and symptoms are consistent with lumbar extension dysfunction and R L4 radiculopathy  Primary impairments include lumbar extension dysfunction and R lumbar closing dysfunction  As a result of impairments patient experiences limitations with functional/daily activities including standing greater than 5 minutes, sitting greater than 20 minutes, and ambulating in home and community  She is unable to complete housework or participate in hobbies including gardening  Patient achieved FOTO score of 32  Educated patient regarding plan of care and answered all patient questions to patient satisfaction  Patient would benefit from skilled PT interventions to address above impairments in order to maximize functional capacity   Thank you for the referral     Impairments: abnormal muscle firing, abnormal or restricted ROM, abnormal movement, activity intolerance, impaired physical strength and pain with function     Prognosis: good    Goals  Impairment Goals: 4-6 weeks  - Patient to decrease pain to 5/10  - Patient to independently manage symptoms with flexion preference exercises  - Patient to report decreased intensity of radicular sensory changes  - Patient to demonstrate proper core activation and body mechanics during functional activities    Functional Goals: by discharge  - Patient to discharge to independent HEP  - Patient to return to prior level of function  - Patient to increase standing/ambulation tolerance with addition of flexion preference stretches as needed  - Patient to tolerate gardening with appropriate modifications  - Patient to report improved tolerance to housework      Plan  Patient would benefit from: skilled physical therapy  Planned modality interventions: cryotherapy, TENS and thermotherapy: hydrocollator packs  Planned therapy interventions: flexibility, home exercise program, joint mobilization, manual therapy, neuromuscular re-education, patient education, strengthening, stretching, therapeutic activities, therapeutic exercise and functional ROM exercises  Frequency: 1x week  Duration in weeks: 6  Treatment plan discussed with: patient        Subjective Evaluation    History of Present Illness  Mechanism of injury: HISTORY OF PRESENT ILLNESS: Patient sustained a fall down concrete steps about 20 years ago with impact to her low back  She did not have a problem for a few years however pain has gradually worsened over time and with age  Patient sustained recent fall due to her cat walking in front of her unexpectedly  Patient has consulted with neurosurgeon who has stated surgery is an option however she should try conservative treatment first  Recent MRI revealed multilevel stenosis per patient with nerve compression  Patient experiences radicular symptoms extending into R LE about 5 days per week  Sensory changes and pain localized to anterior and posterior thigh with numbness extending distally into toes  Patient denies any bowel or bladder changes  PRIOR TREATMENT: muscle relaxers, injections  AGGRAVATING FACTORS: walking, standing up to 5 minutes, sitting up to 20 minutes  EASING FACTORS: heat  WORK: out of work  IMAGING: MRI results in chart  FUNCTIONAL LIMITATIONS: limited walking tolerance, standing < 5 minutes, sitting < 20 minutes, stair navigation, cooking, housework, driving, using vacuum, garden  SUBJECTIVE FUNCTIONAL LEVEL: 30%   PATIENT GOAL: I want to be able to do what I used to do before    Pain  Current pain ratin  At best pain ratin  At worst pain ratin  [place] lumbar and R LE  Quality: sharp          Objective     Neurological Testing     Sensation     Lumbar   Left   Intact: light touch    Right   Diminished: light touch    Comments   Right light touch: anterior lower leg and dorsal foot    Reflexes   Left   Patellar (L4): brisk (3+)  Achilles (S1): absent (0)  Clonus sign: negative    Right   Patellar (L4): trace (1+)  Achilles (S1): absent (0)  Clonus sign: negative    Active Range of Motion     Lumbar   Flexion: Active lumbar flexion: relief    Restriction level: minimal  Extension:  with pain Restriction level: moderate  Left lateral flexion:  WFL  Right lateral flexion:  with pain Restriction level: minimal  Left rotation:  WFL  Right rotation:  with pain Restriction level: moderate  Mechanical Assessment    Cervical      Thoracic      Lumbar    Sitting flexion: repeated movements  Pain location: centralized  Pain intensity: better  Standing extension: repeated movements  Pain location: peripheralized  Pain intensity: worse    Strength/Myotome Testing     Left Hip   Planes of Motion   Flexion: 4    Right Hip   Planes of Motion   Flexion: 3+    Left Knee   Extension: 5    Right Knee   Extension: 5    Left Ankle/Foot   Dorsiflexion: 5  Eversion: 5  Great toe extension: 5    Right Ankle/Foot   Dorsiflexion: 4  Eversion: 5  Great toe extension: 5             Diagnosis: L4 radiculopathy and lumbar extension dysfunction   Precautions: none   Primary impairments: lumbar extension dysfunction and R lumbar closing dysfunction   *asterisks by exercise = given for HEP    5/12       Manuals        R LE LAD                                        There Ex        Rec bike        SKTC *  5" x 5       DKTC *  5" x 5       Seated lumbar flex  5" x 5       LTR on ball                                        Neuro Re-Ed        Supine core brace        Supine core brace with alt pauline/KYRA                                                                                        Re-evaluation             Ther Act/Gait                                         Modalities             MHP        Mechanical traction

## 2022-05-19 ENCOUNTER — TELEMEDICINE (OUTPATIENT)
Dept: PSYCHIATRY | Facility: CLINIC | Age: 52
End: 2022-05-19
Payer: COMMERCIAL

## 2022-05-19 DIAGNOSIS — G47.09 OTHER INSOMNIA: ICD-10-CM

## 2022-05-19 DIAGNOSIS — F41.1 GENERALIZED ANXIETY DISORDER: ICD-10-CM

## 2022-05-19 DIAGNOSIS — F33.2 SEVERE EPISODE OF RECURRENT MAJOR DEPRESSIVE DISORDER, WITHOUT PSYCHOTIC FEATURES (HCC): Primary | ICD-10-CM

## 2022-05-19 PROCEDURE — 99214 OFFICE O/P EST MOD 30 MIN: CPT | Performed by: NURSE PRACTITIONER

## 2022-05-19 RX ORDER — VENLAFAXINE HYDROCHLORIDE 37.5 MG/1
37.5 CAPSULE, EXTENDED RELEASE ORAL DAILY
Qty: 30 CAPSULE | Refills: 2 | Status: SHIPPED | OUTPATIENT
Start: 2022-05-19

## 2022-05-19 RX ORDER — TRAZODONE HYDROCHLORIDE 100 MG/1
100 TABLET ORAL
Qty: 30 TABLET | Refills: 2 | Status: SHIPPED | OUTPATIENT
Start: 2022-05-19 | End: 2022-07-13

## 2022-05-19 RX ORDER — VENLAFAXINE HYDROCHLORIDE 150 MG/1
150 CAPSULE, EXTENDED RELEASE ORAL DAILY
Qty: 90 CAPSULE | Refills: 1 | Status: SHIPPED | OUTPATIENT
Start: 2022-05-19

## 2022-05-19 NOTE — BH TREATMENT PLAN
TREATMENT PLAN (Medication Management Only)        [facility]    Name and Date of Birth:  Rommel Figueredo 46 y o  1970  Date of Treatment Plan: May 19, 2022  Diagnosis/Diagnoses:    1  Severe episode of recurrent major depressive disorder, without psychotic features ([address] )    2  Generalized anxiety disorder    3  Other insomnia      Strengths/Personal Resources for Self-Care: motivation for treatment, taking medications as prescribed, ability to communicate well, ability to listen, ability to reason  Area/Areas of need (in own words): depressive symptoms, anxiety symptoms, sleep problems  1  Long Term Goal: improve anxiety, improve depression and improvesleep  Target Date: 6 months - 11/19/2022  Person/Persons responsible for completion of goal: Rommel Figueredo  2  Short Term Objective (s) - How will we reach this goal?:   A  Provider new recommended medication/dosage changes and/or continue medication(s): continue current medications as prescribed  B   Attend medication management appointments regularly  C   Attend psychotherapy regularly  Target Date: 6 months - 11/19/2022  Person/Persons Responsible for Completion of Goal: Rommel Figueredo  Progress Towards Goals: continuing treatment  Treatment Modality: medication management with psychotherapy every 8 weeks  Review due 90 to 120 days from date of this plan: 6 months - 11/19/2022  Expected length of service: maintenance unless revised  My Physician/PA/NP and I have developed this plan together and I agree to work on the goals and objectives  I understand the treatment goals that were developed for my treatment    Treatment Plan Verbal Consent - Virtual Visit

## 2022-05-20 ENCOUNTER — TELEPHONE (OUTPATIENT)
Dept: PSYCHIATRY | Facility: CLINIC | Age: 52
End: 2022-05-20

## 2022-05-20 NOTE — TELEPHONE ENCOUNTER
Giancarlo Brennan left a message stating that her pharmacy will not fill her script for Effexor 37 5mg as it was cancelled  Can you please check with the pharmacy? Thank you!

## 2022-05-24 ENCOUNTER — APPOINTMENT (OUTPATIENT)
Dept: PHYSICAL THERAPY | Facility: REHABILITATION | Age: 52
End: 2022-05-24
Payer: COMMERCIAL

## 2022-05-24 ENCOUNTER — TELEPHONE (OUTPATIENT)
Dept: NEUROSURGERY | Facility: CLINIC | Age: 52
End: 2022-05-24

## 2022-05-24 NOTE — TELEPHONE ENCOUNTER
I called patient back at [phone] and wero for call back to discuss  She is scheduled for 6/27/22 but we have to make sure she went for PT and PM as indicated prior to her appt  To be able to move it up sooner if something becomes available

## 2022-05-25 ENCOUNTER — OFFICE VISIT (OUTPATIENT)
Dept: FAMILY MEDICINE CLINIC | Facility: CLINIC | Age: 52
End: 2022-05-25
Payer: COMMERCIAL

## 2022-05-25 VITALS
DIASTOLIC BLOOD PRESSURE: 82 MMHG | SYSTOLIC BLOOD PRESSURE: 122 MMHG | HEIGHT: 66 IN | HEART RATE: 100 BPM | BODY MASS INDEX: 22.69 KG/M2 | OXYGEN SATURATION: 95 % | WEIGHT: 141.2 LBS

## 2022-05-25 DIAGNOSIS — F33.2 SEVERE EPISODE OF RECURRENT MAJOR DEPRESSIVE DISORDER, WITHOUT PSYCHOTIC FEATURES (HCC): ICD-10-CM

## 2022-05-25 DIAGNOSIS — F41.1 GENERALIZED ANXIETY DISORDER: ICD-10-CM

## 2022-05-25 DIAGNOSIS — N94.9 ADNEXAL CYST: ICD-10-CM

## 2022-05-25 DIAGNOSIS — Z72.0 TOBACCO ABUSE: ICD-10-CM

## 2022-05-25 DIAGNOSIS — E78.5 HYPERLIPIDEMIA, UNSPECIFIED HYPERLIPIDEMIA TYPE: ICD-10-CM

## 2022-05-25 DIAGNOSIS — K58.0 IRRITABLE BOWEL SYNDROME WITH DIARRHEA: ICD-10-CM

## 2022-05-25 DIAGNOSIS — J44.9 COPD WITHOUT EXACERBATION (HCC): ICD-10-CM

## 2022-05-25 DIAGNOSIS — K21.9 GASTROESOPHAGEAL REFLUX DISEASE WITHOUT ESOPHAGITIS: ICD-10-CM

## 2022-05-25 DIAGNOSIS — Z11.59 NEED FOR HEPATITIS C SCREENING TEST: ICD-10-CM

## 2022-05-25 DIAGNOSIS — Z13.820 OSTEOPOROSIS SCREENING: ICD-10-CM

## 2022-05-25 DIAGNOSIS — Z11.4 SCREENING FOR HIV (HUMAN IMMUNODEFICIENCY VIRUS): ICD-10-CM

## 2022-05-25 DIAGNOSIS — I10 PRIMARY HYPERTENSION: Primary | ICD-10-CM

## 2022-05-25 DIAGNOSIS — Z23 ENCOUNTER FOR IMMUNIZATION: ICD-10-CM

## 2022-05-25 DIAGNOSIS — K55.9 ISCHEMIC COLITIS (HCC): ICD-10-CM

## 2022-05-25 DIAGNOSIS — Z78.0 POST-MENOPAUSE: ICD-10-CM

## 2022-05-25 DIAGNOSIS — M51.37 DDD (DEGENERATIVE DISC DISEASE), LUMBOSACRAL: ICD-10-CM

## 2022-05-25 DIAGNOSIS — L30.4 INTERTRIGO: ICD-10-CM

## 2022-05-25 DIAGNOSIS — K21.00 ESOPHAGITIS, REFLUX: ICD-10-CM

## 2022-05-25 DIAGNOSIS — F11.20 UNCOMPLICATED OPIOID DEPENDENCE (HCC): ICD-10-CM

## 2022-05-25 PROBLEM — Z91.148 PAIN MANAGEMENT CONTRACT BROKEN: Status: RESOLVED | Noted: 2017-06-19 | Resolved: 2022-05-25

## 2022-05-25 PROBLEM — E66.3 OVERWEIGHT (BMI 25.0-29.9): Status: RESOLVED | Noted: 2019-05-20 | Resolved: 2022-05-25

## 2022-05-25 PROBLEM — Z91.14 PAIN MANAGEMENT CONTRACT BROKEN: Status: RESOLVED | Noted: 2017-06-19 | Resolved: 2022-05-25

## 2022-05-25 PROBLEM — Z91.148 PAIN MANAGEMENT CONTRACT BROKEN: Status: ACTIVE | Noted: 2017-06-19

## 2022-05-25 PROBLEM — Z91.14 PAIN MANAGEMENT CONTRACT BROKEN: Status: ACTIVE | Noted: 2017-06-19

## 2022-05-25 PROCEDURE — 99215 OFFICE O/P EST HI 40 MIN: CPT | Performed by: FAMILY MEDICINE

## 2022-05-25 PROCEDURE — 90677 PCV20 VACCINE IM: CPT | Performed by: FAMILY MEDICINE

## 2022-05-25 PROCEDURE — 3725F SCREEN DEPRESSION PERFORMED: CPT | Performed by: FAMILY MEDICINE

## 2022-05-25 PROCEDURE — 3008F BODY MASS INDEX DOCD: CPT | Performed by: FAMILY MEDICINE

## 2022-05-25 PROCEDURE — G0009 ADMIN PNEUMOCOCCAL VACCINE: HCPCS | Performed by: FAMILY MEDICINE

## 2022-05-25 PROCEDURE — 4004F PT TOBACCO SCREEN RCVD TLK: CPT | Performed by: FAMILY MEDICINE

## 2022-05-25 RX ORDER — OMEPRAZOLE 40 MG/1
40 CAPSULE, DELAYED RELEASE ORAL DAILY
Qty: 30 CAPSULE | Refills: 2 | Status: SHIPPED | OUTPATIENT
Start: 2022-05-25

## 2022-05-25 RX ORDER — NYSTATIN 100000 [USP'U]/G
POWDER TOPICAL 3 TIMES DAILY
Qty: 15 G | Refills: 1 | Status: SHIPPED | OUTPATIENT
Start: 2022-05-25 | End: 2022-07-14 | Stop reason: ALTCHOICE

## 2022-05-25 NOTE — ASSESSMENT & PLAN NOTE
Patient did have skin colitis previously  In 2020, her repeat CT scans showed some colitis noted, sign unsure if it is the same issue or something else  She also had a colonoscopy around that same time  I would recommend that she follow-up with Gastroenterology

## 2022-05-25 NOTE — ASSESSMENT & PLAN NOTE
I would recommend that she follow-up with Gastroenterology as she has been there in the past and has had chronic problems with reflux  She has had EGDs in the past as well  I will send a prescription for the omeprazole for her at 40 mg, but again I would recommend she follow up with GI, especially as she also has ischemic colitis

## 2022-05-25 NOTE — ASSESSMENT & PLAN NOTE
Patient does have significant history of depression and anxiety  She is following with Psychiatry  Seems to be stable at the moment  Negative SI, positive contract

## 2022-05-25 NOTE — PROGRESS NOTES
Assessment and Plan:    Problem List Items Addressed This Visit     Adnexal cyst     In 2020, the patient did have an adnexal cyst noted on CT scan  She is not follow-up on this since  Would recommend follow-up with OBGYN, as she also needs gyn exam   Could consider ultrasound, transpelvic, but will defer to OBGYN for further workup  Relevant Orders    Ambulatory Referral to Obstetrics / Gynecology    COPD (chronic obstructive pulmonary disease) ([facility] [address] )     Patient is not really having any shortness of breath or wheezing at the moment  Lungs were clear  Recommend continue with only as needed treatment  She has not needed to use albuterol in quite some time, does not have an inhaler  Because she is not having any problems with at the moment, elected not to give her medication  I would recommend that she quit smoking  DDD (degenerative disc disease), lumbosacral     Patient does have significant problems with degenerative disc disease in the lumbar region  Please see notes from Neurosurgery who are recommending a fusion  Follow-up afterwards  Medications per pain management and neuro surgery  Depression     Patient does have significant history of depression and anxiety  She is following with Psychiatry  Seems to be stable at the moment  Negative SI, positive contract  Generalized anxiety disorder     Follow with Psychiatry  No changes  GERD (gastroesophageal reflux disease)     I would recommend that she follow-up with Gastroenterology as she has been there in the past and has had chronic problems with reflux  She has had EGDs in the past as well  I will send a prescription for the omeprazole for her at 40 mg, but again I would recommend she follow up with GI, especially as she also has ischemic colitis             Relevant Medications    omeprazole (PriLOSEC) 40 MG capsule    Hyperlipidemia     Patient does have history of hyperlipidemia listed in the chart, however she has not had any blood work done quite a while  Ordered  Relevant Orders    Comprehensive metabolic panel    Lipid Panel with Direct LDL reflex    Hypertension - Primary     Patient does have hypertension listed, however she is currently on blood pressure medication  In the past, she was on lisinopril 20 mg  In this office, her blood pressure was reasonable  When she is at neuro surgery, her blood pressure is significantly higher  Question this is related to the pain or other issues with anxiety related to the possibility of surgery, but at this point no changes would be necessary I would not start her on medications  I would check laboratory studies  Relevant Orders    CBC and differential    Comprehensive metabolic panel    TSH, 3rd generation    Irritable bowel syndrome with diarrhea     Patient does have IBS D listed in the chart  She has not been to GI recently  There was also the question of ischemic colitis with this  Because of that, I would again recommend that she follow-up with Gastroenterology  Recommend fiber supplementation daily  This would also help with constipation if she were to have any  Ischemic colitis [facility])     Patient did have skin colitis previously  In 2020, her repeat CT scans showed some colitis noted, sign unsure if it is the same issue or something else  She also had a colonoscopy around that same time  I would recommend that she follow-up with Gastroenterology  Opioid dependence ([facility] [address] )     Patient following up with pain management at [facility], Dr Trinidad Johnson  She has been on Nucynta  Seems to be working  She will continue pain management for narcotic contract and for medications for pain  Tobacco abuse     Recommend quit smoking  Patient understands risks  Currently smoking half pack per day    Reviewed about cutting down, switching brands, patches, other medications such as sprays inhalers and gums              Other Visit Diagnoses     Osteoporosis screening        Recommend DEXA scan as per discussion of post menopause  Relevant Orders    DXA bone density spine hip and pelvis    Post-menopause        Screen for osteoporosis with DEXA scan  Reviewed risk of hip fracture  Relevant Orders    DXA bone density spine hip and pelvis    Need for hepatitis C screening test        As per discussion of HIV, some hardness in the past   Check labs  Relevant Orders    Hepatitis C Antibody ([facility])    Screening for HIV (human immunodeficiency virus)        Patient has had some partners in the past, but nothing in particular that puts her at severe risk, but she agrees to testing  Relevant Orders    HIV 1/2 Antigen/Antibody (4th Generation) w Reflex [facility]    Encounter for immunization        Recommend Prevnar 20  She does have COPD  Relevant Orders    Pneumococcal Conjugate Vaccine 20-valent (PCV20)    Intertrigo        Recommend nystatin powder  Will send prescription to the pharmacy  Relevant Medications    nystatin (MYCOSTATIN) powder    Esophagitis, reflux        Relevant Medications    omeprazole (PriLOSEC) 40 MG capsule                 Diagnoses and all orders for this visit:    Primary hypertension  -     CBC and differential; Future  -     Comprehensive metabolic panel; Future  -     TSH, 3rd generation; Future    Hyperlipidemia, unspecified hyperlipidemia type  -     Comprehensive metabolic panel; Future  -     Lipid Panel with Direct LDL reflex; Future    COPD without exacerbation ([address] )    DDD (degenerative disc disease), lumbosacral    Generalized anxiety disorder    Tobacco abuse    Uncomplicated opioid dependence ([address] )    Gastroesophageal reflux disease without esophagitis    Ischemic colitis ([address] )    Osteoporosis screening  Comments:  Recommend DEXA scan as per discussion of post menopause    Orders:  -     DXA bone density spine hip and pelvis; Future    Post-menopause  Comments:  Screen for osteoporosis with DEXA scan  Reviewed risk of hip fracture  Orders:  -     DXA bone density spine hip and pelvis; Future    Need for hepatitis C screening test  Comments:  As per discussion of HIV, some hardness in the past   Check labs  Orders:  -     Hepatitis C Antibody ([facility]); Future    Screening for HIV (human immunodeficiency virus)  Comments:  Patient has had some partners in the past, but nothing in particular that puts her at severe risk, but she agrees to testing  Orders:  -     HIV 1/2 Antigen/Antibody (4th Generation) w Reflex [facility]; Future    Encounter for immunization  Comments:  Recommend Prevnar 20  She does have COPD  Orders:  -     Pneumococcal Conjugate Vaccine 20-valent (PCV20)    Intertrigo  Comments:  Recommend nystatin powder  Will send prescription to the pharmacy  Orders:  -     nystatin (MYCOSTATIN) powder; Apply topically 3 (three) times a day    Severe episode of recurrent major depressive disorder, without psychotic features (HCC)    Esophagitis, reflux  -     omeprazole (PriLOSEC) 40 MG capsule; Take 1 capsule (40 mg total) by mouth daily Take 1 capsule po in the am 30 min prior to eating prn    Irritable bowel syndrome with diarrhea    Adnexal cyst  -     Ambulatory Referral to Obstetrics / Gynecology; Future            Subjective:      Patient ID: Larry Powell is a 46 y o  female  CC:    Chief Complaint   Patient presents with   [facility] Patient Visit     Pt is New to the office and would like to discuss possible upcoming Back surgery  Pt will see the Neurosurgeon 6/27/2022  Pt also concerns that she has a lot of Family History of Parkinsons and she would like to know if there is testing or pre-treatment  kw       HPI:    Patient is here to establish care  It is been a bit since she last saw primary care physician  She does have lumbar disc disease, is been following with neuro surgery    They are talking about doing a lumbar fusion for her  They felt she needed primary care to follow-up on routine issues, prior to the surgery  Lumbar disc disease:  Patient is talking with pain management, as well as neuro surgery  She is currently on Nucynta for pain relief, as well as gabapentin  Reports she can not stand for more than 5 minutes without getting increased discomfort  She does have an appointment coming up with neuro surgery in June  Depression:  Patient does follow with Psychiatry at [facility]  Hypertension:  Patient does have hypertension listed  She is not currently on medications  I did review old blood pressures which were also elevated  Hyperlipidemia:  Noted previously as a diagnosis, but again not on medications  No recent check of labs  Patient does have a history of ischemic colitis, and then continue discussion about irritable bowel with diarrhea  She has been to GI in the past, but it appears the last visit there was approximately 2019  They are also discussing about reflux at that point, and she was taking omeprazole 40 mg  With regard to the reflux, she is now asking for omeprazole prescription  She has been using it over-the-counter  Patient does have significant reflux symptoms every day  Currently on 40 mg omeprazole  Tobacco:  Patient does smoke half pack per day  She has cut back from 1 pack per day  Reviewed about smoking and risks  This would include cancer, emphysema, asthma  There certainly other risks as well  Patient does understand this  COPD: She has had some issues with this in the past  Not on MDI's  Patient does have a rash under the breasts bilaterally  She wondered what it was  Does not itch, just is raw and sore  Has been present for 1-1/2 weeks approximately  Used some anti-itch cream, despite the fact that was not really itchy  No other treatments        The following portions of the patient's history were reviewed and updated as appropriate: allergies, current medications, past family history, past medical history, past social history, past surgical history and problem list       Review of Systems      Data to review:       Objective:    Vitals:    05/25/22 1213   BP: 122/82   BP [place] Right arm   Patient Position: Sitting   Pulse: 100   SpO2: 95%   Weight: 64 kg (141 lb 3 2 oz)   Height: 5' 6" (1 676 m)        Physical Exam  Vitals and nursing note reviewed  Constitutional:       Appearance: Normal appearance  HENT:      Head: Normocephalic  Cardiovascular:      Rate and Rhythm: Normal rate and regular rhythm  Pulses: Normal pulses  Carotid pulses are 2+ on the right side and 2+ on the left side  Heart sounds: Normal heart sounds  No murmur heard  No friction rub  No gallop  Pulmonary:      Effort: Pulmonary effort is normal  No respiratory distress  Breath sounds: Normal breath sounds  No stridor  No wheezing, rhonchi or rales  Chest:      Chest wall: No tenderness  Comments: Redness irritation with satellite lesions as noted on the drawing for location  Abdominal:      General: Abdomen is flat  Bowel sounds are normal       Palpations: Abdomen is soft  Tenderness: There is no abdominal tenderness  Neurological:      Mental Status: She is alert  Tobacco Cessation Counseling: Tobacco cessation counseling was provided  The patient is sincerely urged to quit consumption of tobacco  She is not ready to quit tobacco  Medication options and side effects of medication discussed  Patient refused medication  I have spent 60 minutes with Patient  today in which greater than 50% of this time was spent in counseling/coordination of care regarding Diagnostic results, Prognosis, Risks and benefits of tx options, Intructions for management, Patient and family education, Importance of tx compliance, Risk factor reductions and Impressions

## 2022-05-25 NOTE — ASSESSMENT & PLAN NOTE
In 2020, the patient did have an adnexal cyst noted on CT scan  She is not follow-up on this since  Would recommend follow-up with OBGYN, as she also needs gyn exam   Could consider ultrasound, transpelvic, but will defer to OBGYN for further workup

## 2022-05-25 NOTE — ASSESSMENT & PLAN NOTE
Patient does have hypertension listed, however she is currently on blood pressure medication  In the past, she was on lisinopril 20 mg  In this office, her blood pressure was reasonable  When she is at neuro surgery, her blood pressure is significantly higher  Question this is related to the pain or other issues with anxiety related to the possibility of surgery, but at this point no changes would be necessary I would not start her on medications  I would check laboratory studies  23-Mar-2018 01:02

## 2022-05-25 NOTE — ASSESSMENT & PLAN NOTE
Patient is not really having any shortness of breath or wheezing at the moment  Lungs were clear  Recommend continue with only as needed treatment  She has not needed to use albuterol in quite some time, does not have an inhaler  Because she is not having any problems with at the moment, elected not to give her medication  I would recommend that she quit smoking

## 2022-05-25 NOTE — ASSESSMENT & PLAN NOTE
Patient does have IBS D listed in the chart  She has not been to GI recently  There was also the question of ischemic colitis with this  Because of that, I would again recommend that she follow-up with Gastroenterology  Recommend fiber supplementation daily  This would also help with constipation if she were to have any

## 2022-05-25 NOTE — ASSESSMENT & PLAN NOTE
Recommend quit smoking  Patient understands risks  Currently smoking half pack per day  Reviewed about cutting down, switching brands, patches, other medications such as sprays inhalers and gums

## 2022-05-25 NOTE — ASSESSMENT & PLAN NOTE
Patient does have significant problems with degenerative disc disease in the lumbar region  Please see notes from Neurosurgery who are recommending a fusion  Follow-up afterwards  Medications per pain management and neuro surgery

## 2022-05-25 NOTE — PATIENT INSTRUCTIONS
Problem List Items Addressed This Visit       Adnexal cyst     In 2020, the patient did have an adnexal cyst noted on CT scan  She is not follow-up on this since  Would recommend follow-up with OBGYN, as she also needs gyn exam   Could consider ultrasound, transpelvic, but will defer to OBGYN for further workup  Relevant Orders    Ambulatory Referral to Obstetrics / Gynecology    COPD (chronic obstructive pulmonary disease) ([facility] [address] )     Patient is not really having any shortness of breath or wheezing at the moment  Lungs were clear  Recommend continue with only as needed treatment  She has not needed to use albuterol in quite some time, does not have an inhaler  Because she is not having any problems with at the moment, elected not to give her medication  I would recommend that she quit smoking  DDD (degenerative disc disease), lumbosacral     Patient does have significant problems with degenerative disc disease in the lumbar region  Please see notes from Neurosurgery who are recommending a fusion  Follow-up afterwards  Medications per pain management and neuro surgery  Depression     Patient does have significant history of depression and anxiety  She is following with Psychiatry  Seems to be stable at the moment  Negative SI, positive contract  Generalized anxiety disorder     Follow with Psychiatry  No changes  GERD (gastroesophageal reflux disease)     I would recommend that she follow-up with Gastroenterology as she has been there in the past and has had chronic problems with reflux  She has had EGDs in the past as well  I will send a prescription for the omeprazole for her at 40 mg, but again I would recommend she follow up with GI, especially as she also has ischemic colitis             Relevant Medications    omeprazole (PriLOSEC) 40 MG capsule    Hyperlipidemia     Patient does have history of hyperlipidemia listed in the chart, however she has not had any blood work done quite a while  Ordered  Relevant Orders    Comprehensive metabolic panel    Lipid Panel with Direct LDL reflex    Hypertension - Primary     Patient does have hypertension listed, however she is currently on blood pressure medication  In the past, she was on lisinopril 20 mg  In this office, her blood pressure was reasonable  When she is at neuro surgery, her blood pressure is significantly higher  Question this is related to the pain or other issues with anxiety related to the possibility of surgery, but at this point no changes would be necessary I would not start her on medications  I would check laboratory studies  Relevant Orders    CBC and differential    Comprehensive metabolic panel    TSH, 3rd generation    Irritable bowel syndrome with diarrhea     Patient does have IBS D listed in the chart  She has not been to GI recently  There was also the question of ischemic colitis with this  Because of that, I would again recommend that she follow-up with Gastroenterology  Recommend fiber supplementation daily  This would also help with constipation if she were to have any  Ischemic colitis [facility])     Patient did have skin colitis previously  In 2020, her repeat CT scans showed some colitis noted, sign unsure if it is the same issue or something else  She also had a colonoscopy around that same time  I would recommend that she follow-up with Gastroenterology  Opioid dependence ([facility] [address] )     Patient following up with [facility] management at [facility], Dr Fern Mcmanus  She has been on Nucynta  Seems to be working  She will continue pain management for narcotic contract and for medications for pain  Tobacco abuse     Recommend quit smoking  Patient understands risks  Currently smoking half pack per day  Reviewed about cutting down, switching brands, patches, other medications such as sprays inhalers and gums  Other Visit Diagnoses       Osteoporosis screening        Recommend DEXA scan as per discussion of post menopause  Relevant Orders    DXA bone density spine hip and pelvis    Post-menopause        Screen for osteoporosis with DEXA scan  Reviewed risk of hip fracture  Relevant Orders    DXA bone density spine hip and pelvis    Need for hepatitis C screening test        As per discussion of HIV, some hardness in the past   Check labs  Relevant Orders    Hepatitis C Antibody ([facility])    Screening for HIV (human immunodeficiency virus)        Patient has had some partners in the past, but nothing in particular that puts her at severe risk, but she agrees to testing  Relevant Orders    HIV 1/2 Antigen/Antibody (4th Generation) w Reflex [facility]    Encounter for immunization        Recommend Prevnar 20  She does have COPD  Relevant Orders    Pneumococcal Conjugate Vaccine 20-valent (PCV20)    Intertrigo        Recommend nystatin powder  Will send prescription to the pharmacy  Relevant Medications    nystatin (MYCOSTATIN) powder    Esophagitis, reflux        Relevant Medications    omeprazole (PriLOSEC) 40 MG capsule            COVID 19 Instructions    Marino Pascual was advised to limit contact with others to essential tasks such as getting food, medications, and medical care  Proper handwashing reviewed, and Hand sanitzer when washing is not available  If the patient develops symptoms of COVID 19, the patient should call the office as soon as possible  For [id number] Flu season, it is strongly recommended that Flu Vaccinations be obtained  Virtual Visits:  Michelle: This works on smart phones (any phone with Internet browsing capability)  You should get a text message when the provider is ready to see you  Click on the link in the text message, and the call should start  You will need to type in your name, and allow camera and microphone access    This is HIPPA compliant, and secure  If you have not already done so, get immunized to COVID 19  We are committed to getting you vaccinated as soon as possible and will be closely following CDC and [facility]  guidelines as they are released and revised  Please refer to our COVID-19 vaccine webpage for the most up to date information on the vaccine and our distribution efforts  This site will also have the most up to date recommendations for COVID booster vaccine  Sarai ybarra    Call [phone]), option [phone]    OUR NEW LOCATION:    [address], [address]  Fax: [phone]    Lab services and OB/GYN are at this location as well

## 2022-05-25 NOTE — ASSESSMENT & PLAN NOTE
Patient following up with pain management at [facility], Dr Gricelda Garcia  She has been on Nucynta  Seems to be working  She will continue pain management for narcotic contract and for medications for pain

## 2022-05-25 NOTE — ASSESSMENT & PLAN NOTE
Patient does have history of hyperlipidemia listed in the chart, however she has not had any blood work done quite a while  Ordered

## 2022-05-27 ENCOUNTER — APPOINTMENT (OUTPATIENT)
Dept: LAB | Facility: CLINIC | Age: 52
End: 2022-05-27
Payer: COMMERCIAL

## 2022-05-27 DIAGNOSIS — I10 PRIMARY HYPERTENSION: ICD-10-CM

## 2022-05-27 DIAGNOSIS — Z11.59 NEED FOR HEPATITIS C SCREENING TEST: ICD-10-CM

## 2022-05-27 DIAGNOSIS — E78.5 HYPERLIPIDEMIA, UNSPECIFIED HYPERLIPIDEMIA TYPE: ICD-10-CM

## 2022-05-27 DIAGNOSIS — Z11.4 SCREENING FOR HIV (HUMAN IMMUNODEFICIENCY VIRUS): ICD-10-CM

## 2022-05-27 LAB
ALBUMIN SERPL BCP-MCNC: 3.8 G/DL (ref 3.5–5)
ALP SERPL-CCNC: 77 U/L (ref 46–116)
ALT SERPL W P-5'-P-CCNC: 26 U/L (ref 12–78)
ANION GAP SERPL CALCULATED.3IONS-SCNC: 7 MMOL/L (ref 4–13)
AST SERPL W P-5'-P-CCNC: 26 U/L (ref 5–45)
BASOPHILS # BLD AUTO: 0.07 THOUSANDS/ΜL (ref 0–0.1)
BASOPHILS NFR BLD AUTO: 1 % (ref 0–1)
BILIRUB SERPL-MCNC: 0.58 MG/DL (ref 0.2–1)
BUN SERPL-MCNC: 11 MG/DL (ref 5–25)
CALCIUM SERPL-MCNC: 9.6 MG/DL (ref 8.3–10.1)
CHLORIDE SERPL-SCNC: 105 MMOL/L (ref 100–108)
CHOLEST SERPL-MCNC: 204 MG/DL
CO2 SERPL-SCNC: 26 MMOL/L (ref 21–32)
CREAT SERPL-MCNC: 1.08 MG/DL (ref 0.6–1.3)
EOSINOPHIL # BLD AUTO: 0.08 THOUSAND/ΜL (ref 0–0.61)
EOSINOPHIL NFR BLD AUTO: 1 % (ref 0–6)
ERYTHROCYTE [DISTWIDTH] IN BLOOD BY AUTOMATED COUNT: 13.9 % (ref 11.6–15.1)
GFR SERPL CREATININE-BSD FRML MDRD: 58 ML/MIN/1.73SQ M
GLUCOSE P FAST SERPL-MCNC: 106 MG/DL (ref 65–99)
HCT VFR BLD AUTO: 45.3 % (ref 34.8–46.1)
HCV AB SER QL: NORMAL
HDLC SERPL-MCNC: 85 MG/DL
HGB BLD-MCNC: 14.6 G/DL (ref 11.5–15.4)
IMM GRANULOCYTES # BLD AUTO: 0.02 THOUSAND/UL (ref 0–0.2)
IMM GRANULOCYTES NFR BLD AUTO: 0 % (ref 0–2)
LDLC SERPL CALC-MCNC: 64 MG/DL (ref 0–100)
LYMPHOCYTES # BLD AUTO: 2.37 THOUSANDS/ΜL (ref 0.6–4.47)
LYMPHOCYTES NFR BLD AUTO: 33 % (ref 14–44)
MCH RBC QN AUTO: 33.1 PG (ref 26.8–34.3)
MCHC RBC AUTO-ENTMCNC: 32.2 G/DL (ref 31.4–37.4)
MCV RBC AUTO: 103 FL (ref 82–98)
MONOCYTES # BLD AUTO: 0.44 THOUSAND/ΜL (ref 0.17–1.22)
MONOCYTES NFR BLD AUTO: 6 % (ref 4–12)
NEUTROPHILS # BLD AUTO: 4.16 THOUSANDS/ΜL (ref 1.85–7.62)
NEUTS SEG NFR BLD AUTO: 59 % (ref 43–75)
NRBC BLD AUTO-RTO: 0 /100 WBCS
PLATELET # BLD AUTO: 312 THOUSANDS/UL (ref 149–390)
PMV BLD AUTO: 10.3 FL (ref 8.9–12.7)
POTASSIUM SERPL-SCNC: 3.5 MMOL/L (ref 3.5–5.3)
PROT SERPL-MCNC: 7.2 G/DL (ref 6.4–8.2)
RBC # BLD AUTO: 4.41 MILLION/UL (ref 3.81–5.12)
SODIUM SERPL-SCNC: 138 MMOL/L (ref 136–145)
TRIGL SERPL-MCNC: 276 MG/DL
TSH SERPL DL<=0.05 MIU/L-ACNC: 2.21 UIU/ML (ref 0.45–4.5)
WBC # BLD AUTO: 7.14 THOUSAND/UL (ref 4.31–10.16)

## 2022-05-27 PROCEDURE — 80053 COMPREHEN METABOLIC PANEL: CPT

## 2022-05-27 PROCEDURE — 80061 LIPID PANEL: CPT

## 2022-05-27 PROCEDURE — 85025 COMPLETE CBC W/AUTO DIFF WBC: CPT

## 2022-05-27 PROCEDURE — 86803 HEPATITIS C AB TEST: CPT

## 2022-05-27 PROCEDURE — 87389 HIV-1 AG W/HIV-1&-2 AB AG IA: CPT

## 2022-05-27 PROCEDURE — 84443 ASSAY THYROID STIM HORMONE: CPT

## 2022-05-27 PROCEDURE — 36415 COLL VENOUS BLD VENIPUNCTURE: CPT

## 2022-05-29 LAB — HIV 1+2 AB+HIV1 P24 AG SERPL QL IA: NORMAL

## 2022-06-21 ENCOUNTER — TELEPHONE (OUTPATIENT)
Dept: NEUROSURGERY | Facility: CLINIC | Age: 52
End: 2022-06-21

## 2022-06-21 NOTE — TELEPHONE ENCOUNTER
Patient returned phone call and informed me she has been [facility] with [facility] and received a nerve block 2 weeks ago  Phone number is [phone]  To request records  She stated she has not followed with Physical therapy after her initial consultation because there is only one vehicle in the household and her s/o works so she did not have the means to get to Physical therapy  She would like to keep her appointment with Dr Gómez Ann scheduled for 6/27/22  Patient was appreciative of the phone call and is looking forward to her upcoming appointment

## 2022-06-27 NOTE — ASSESSMENT & PLAN NOTE
While her chronic diarrhea is likely secondary to irritable bowel syndrome with diarrhea, cannot rule out other causes of chronic diarrhea such as inflammatory bowel disease, celiac disease, or microscopic colitis  Order celiac panel to evaluate for gluten intolerance  We will schedule EGD and colonoscopy once cleared by ENT  Recommend duodenal biopsies and random colon biopsies to assess for celiac disease and microscopic colitis  Telepsychiatry utilizing Video Platform

## 2022-07-06 ENCOUNTER — TELEPHONE (OUTPATIENT)
Dept: FAMILY MEDICINE CLINIC | Facility: CLINIC | Age: 52
End: 2022-07-06

## 2022-07-06 DIAGNOSIS — S02.5XXB OPEN FRACTURE OF TOOTH, INITIAL ENCOUNTER: Primary | ICD-10-CM

## 2022-07-06 NOTE — TELEPHONE ENCOUNTER
On Saturday 7/2 pt's filling came out of her tooth and the tooth broke  She is requesting a referral for an oral surgeon  She said the tooth is so sharp it's starting to cut her tongue  She does not have a dentist right now  She is seeing dr Balaji Pascal next week but doesn't know if she can wait that long  Is he willing to give a referral? I also mentioned she can try calling emergency dental clinics as well  Please advise, thank you

## 2022-07-06 NOTE — TELEPHONE ENCOUNTER
I entered a referral to oral surgery, but she should really follow up with a dentist as soon as possible  This is specially true, as dental fractures are considered open fracture, and then there is an increased chance of infection

## 2022-07-08 ENCOUNTER — TELEPHONE (OUTPATIENT)
Dept: PSYCHIATRY | Facility: CLINIC | Age: 52
End: 2022-07-08

## 2022-07-08 NOTE — TELEPHONE ENCOUNTER
Serina Boyce called as she was requesting medication to help her sleep  Patient reported that she only sleeps about 3 hours a night  Patient was advised that she has an appointment on 7/13 and Janak Mesisna will review her medication with her then

## 2022-07-13 ENCOUNTER — TELEPHONE (OUTPATIENT)
Dept: PSYCHIATRY | Facility: CLINIC | Age: 52
End: 2022-07-13

## 2022-07-13 ENCOUNTER — TELEMEDICINE (OUTPATIENT)
Dept: PSYCHIATRY | Facility: CLINIC | Age: 52
End: 2022-07-13
Payer: COMMERCIAL

## 2022-07-13 DIAGNOSIS — F41.1 GENERALIZED ANXIETY DISORDER: ICD-10-CM

## 2022-07-13 DIAGNOSIS — F33.2 SEVERE EPISODE OF RECURRENT MAJOR DEPRESSIVE DISORDER, WITHOUT PSYCHOTIC FEATURES (HCC): Primary | ICD-10-CM

## 2022-07-13 DIAGNOSIS — G47.09 OTHER INSOMNIA: ICD-10-CM

## 2022-07-13 PROCEDURE — 99214 OFFICE O/P EST MOD 30 MIN: CPT | Performed by: NURSE PRACTITIONER

## 2022-07-13 RX ORDER — QUETIAPINE FUMARATE 50 MG/1
TABLET, FILM COATED ORAL
Qty: 30 TABLET | Refills: 3 | Status: ON HOLD | OUTPATIENT
Start: 2022-07-13

## 2022-07-13 RX ORDER — ESZOPICLONE 2 MG/1
2 TABLET, FILM COATED ORAL
Qty: 30 TABLET | Refills: 0 | Status: SHIPPED | OUTPATIENT
Start: 2022-07-13 | End: 2022-09-07 | Stop reason: ALTCHOICE

## 2022-07-13 NOTE — PSYCH
Virtual Regular Visit    Problem List Items Addressed This Visit        Other    Depression - Primary    Relevant Medications    eszopiclone (LUNESTA) 2 mg tablet    QUEtiapine (SEROquel) 50 mg tablet    Generalized anxiety disorder    Relevant Medications    eszopiclone (LUNESTA) 2 mg tablet    QUEtiapine (SEROquel) 50 mg tablet    Insomnia    Relevant Medications    eszopiclone (LUNESTA) 2 mg tablet    QUEtiapine (SEROquel) 50 mg tablet             Encounter provider CARLITOS Casanova    Provider located at   [address]  [facility] [address]  [place] [address] [phone]    Patient is located in the following state in which I hold an active license [place]    Recent Visits  Date Type Provider Dept   07/08/22 Telephone Nu Gonzalez, [address]   07/06/22 Telephone Sylvia Silvestre  [address] [facility]   Showing recent visits within past 7 days and meeting all other requirements  Today's Visits  Date Type Provider Dept   07/13/22 Telemedicine CARLITOS [facility] [address]   Showing today's visits and meeting all other requirements  Future Appointments  No visits were found meeting these conditions  Showing future appointments within next 150 days and meeting all other requirements     The patient was identified by name and date of birth  Luis Fernando Abbott was informed that this is a telemedicine visit and that the visit is being conducted through [facility] and patient was informed that this is a secure, HIPAA-compliant platform  Luis Fernando Abbott agrees to proceed  My office door was closed  No one else was in the room  She acknowledged consent and understanding of privacy and security of the video platform  The patient has agreed to participate and understands they can discontinue the visit at any time  Patient is aware this is a billable service       HPI     Current Outpatient Medications Medication Sig Dispense Refill    eszopiclone (LUNESTA) 2 mg tablet Take 1 tablet (2 mg total) by mouth daily at bedtime as needed for sleep Take immediately before bedtime 30 tablet 0    QUEtiapine (SEROquel) 50 mg tablet Take half or whole table by mouth daily at bedtime 30 tablet 3    diclofenac (VOLTAREN) 75 mg EC tablet Take 75 mg by mouth 2 (two) times a day      gabapentin (NEURONTIN) 300 mg capsule Take 300 mg by mouth 4 (four) times a day      methocarbamol (ROBAXIN) 500 mg tablet Take 1 tablet (500 mg total) by mouth 2 (two) times a day as needed for muscle spasms 20 tablet 0    Nucynta 50 MG tablet Take 50 mg by mouth 3 (three) times a day as needed      nystatin (MYCOSTATIN) powder Apply topically 3 (three) times a day 15 g 1    omeprazole (PriLOSEC) 40 MG capsule Take 1 capsule (40 mg total) by mouth daily Take 1 capsule po in the am 30 min prior to eating prn 30 capsule 2    venlafaxine (EFFEXOR-XR) 150 mg 24 hr capsule Take 1 capsule (150 mg total) by mouth in the morning  90 capsule 1    venlafaxine (EFFEXOR-XR) 37 5 mg 24 hr capsule Take 1 capsule (37 5 mg total) by mouth in the morning  30 capsule 2     No current facility-administered medications for this visit  Review of Systems  Video Exam    There were no vitals filed for this visit  Physical Exam   As a result of this visit, I have referred the patient for further respiratory evaluation  No    I spent  15 minutes directly with the patient during this visit  VIRTUAL VISIT DISCLAIMER    Jacques Johnson acknowledges that she has consented to an online visit or consultation  She understands that the online visit is based solely on information provided by her, and that, in the absence of a face-to-face physical evaluation by the physician, the diagnosis she receives is both limited and provisional in terms of accuracy and completeness  This is not intended to replace a full medical face-to-face evaluation by the physician  Magali Munguia understands and accepts these terms  MEDICATION MANAGEMENT NOTE        [facility]    Name and Date of Birth:  Magali Munguia 46 y o  1970 MRN: [id number]    Date of Visit: July 13, 2022    Allergies   Allergen Reactions    Chantix [Varenicline]     Ibuprofen Other (See Comments)     Upset stomach    Lyrica [Pregabalin] Other (See Comments)     bruising    Penicillins Other (See Comments)     ? hives    Sulfa Antibiotics Other (See Comments)     sloughing skin in mouth    Sulfasalazine      SUBJECTIVE:    Adithya Ennis is seen today for a follow up for Major Depressive Disorder, anxiety and insomnia  She reports that she has experienced ongoing symptoms since the last visit  Reports that she continues to experience consistently dysphoric mood  States that she has lack of enjoyment from life  He constant chronic pain, sleeplessness, poor overall health, difficult marriage at times  Patient requests time-limited prescription Lunesta  States that this medication was previously helpful for her  Patient encouraged to take witnessed it every other night once every 3rd or 4th night  In addition discussed initiation quetiapine for depression augmentation, mood stabilization and help with sleep and reduction of anxiety at bedtime  Will continue Effexor current dose    She denies any side effects from medications  PLAN:  Initiate Lunesta 2 mg p o  Daily; patient may take half or tab with at bedtime as needed for sleep; Refill ok every 3-4 months  Initiate quetiapine 50 mg p o  Daily at bedtime  Continue Effexor 187 5 mg p o   Daily; consider increase at next visit   Continue individual therapy  Follow-up 3 months  Aware of 24 hour and weekend coverage for urgent situations accessed by calling [facility] main practice number    Diagnoses and all orders for this visit:    Severe episode of recurrent major depressive disorder, without psychotic features ([facility] [address] )  -     QUEtiapine (SEROquel) 50 mg tablet; Take half or whole table by mouth daily at bedtime    Generalized anxiety disorder  -     QUEtiapine (SEROquel) 50 mg tablet; Take half or whole table by mouth daily at bedtime    Other insomnia  -     eszopiclone (LUNESTA) 2 mg tablet; Take 1 tablet (2 mg total) by mouth daily at bedtime as needed for sleep Take immediately before bedtime  -     QUEtiapine (SEROquel) 50 mg tablet; Take half or whole table by mouth daily at bedtime        Current Outpatient Medications on File Prior to Visit   Medication Sig Dispense Refill    diclofenac (VOLTAREN) 75 mg EC tablet Take 75 mg by mouth 2 (two) times a day      gabapentin (NEURONTIN) 300 mg capsule Take 300 mg by mouth 4 (four) times a day      methocarbamol (ROBAXIN) 500 mg tablet Take 1 tablet (500 mg total) by mouth 2 (two) times a day as needed for muscle spasms 20 tablet 0    Nucynta 50 MG tablet Take 50 mg by mouth 3 (three) times a day as needed      nystatin (MYCOSTATIN) powder Apply topically 3 (three) times a day 15 g 1    omeprazole (PriLOSEC) 40 MG capsule Take 1 capsule (40 mg total) by mouth daily Take 1 capsule po in the am 30 min prior to eating prn 30 capsule 2    venlafaxine (EFFEXOR-XR) 150 mg 24 hr capsule Take 1 capsule (150 mg total) by mouth in the morning  90 capsule 1    venlafaxine (EFFEXOR-XR) 37 5 mg 24 hr capsule Take 1 capsule (37 5 mg total) by mouth in the morning  30 capsule 2    [DISCONTINUED] traZODone (DESYREL) 100 mg tablet Take 1 tablet (100 mg total) by mouth daily at bedtime as needed for sleep 30 tablet 2     No current facility-administered medications on file prior to visit  Psychotherapy Provided:     Individual psychotherapy provided: Yes  Counseling was provided during the session today for 16 minutes  Supportive counseling provided       HPI ROS Appetite Changes and Sleep:     She reports difficulty falling asleep, adequate appetite, low energy   Denies homicidal ideation, denies suicidal ideation    Review Of Systems:      General emotional problems, decreased functioning, sleep disturbances, appetite disturbances and marital problems   Personality no change in personality   Constitutional negative   ENT negative   Cardiovascular negative   Respiratory negative   Gastrointestinal negative   Genitourinary negative   Musculoskeletal negative   Integumentary negative   Neurological negative   Endocrine negative   Other Symptoms none, all other systems are negative     Mental Status Evaluation:    Appearance Adequate hygiene and grooming   Behavior friendly   Mood anxious, depressed and dysphoric  Depression Scale -  of 10 (0 = No depression)  Anxiety Scale -  of 10 (0 = No anxiety)   Speech Normal rate and volume   Affect mood-congruent   Thought Processes Goal directed and coherent   Thought Content Does not verbalize delusional material   Associations Tightly connected   Perceptual Disturbances Denies hallucinations and does not appear to be responding to internal stimuli   Risk Potential Suicidal/Homicidal Ideation - No evidence of suicidal or homicidal ideation and patient does not verbalize suicidal or homicidal ideation  Risk of Violence - No evidence of risk for violence found on assessment  Risk of Self Mutilation - No evidence of risk for self mutilation found on assessment   Orientation oriented to person, place, time/date and situation   Memory recent and remote memory grossly intact   Consciousness alert and awake   Attention/Concentration attention span and concentration are age appropriate   Insight partial   Judgement fair   Muscle Strength and Gait normal muscle strength and normal muscle tone, normal gait/station and normal balance   Motor Activity no abnormal movements   Language no difficulty naming common objects, no difficulty repeating a phrase, no difficulty writing a sentence   Fund of Knowledge adequate knowledge of current events  adequate fund of knowledge regarding past history  adequate fund of knowledge regarding vocabulary      Past Psychiatric History Update:     Inpatient Psychiatric Admission Since Last Encounter:   no  Suicide Attempt Or Self Mutilation Since Last Encounter:   no  Incidence of Violent Behavior Since Last Encounter:   no    Traumatic History Update:     New Onset of Abuse Since Last Encounter:   no  Traumatic Events Since Last Encounter:   no    Past Medical History:    Past Medical History:   Diagnosis Date    Chronic pain disorder     Depression     GERD (gastroesophageal reflux disease)     History of electroconvulsive therapy     Low back pain     Self-injurious behavior     Suicide attempt [facility])         Past Surgical History:   Procedure Laterality Date     SECTION      COLONOSCOPY      PANCREAS SURGERY      "pseudocysts" per patient's  Ricki Arce [facility] ESOPHAGOGASTRODUODENOSCOPY TRANSORAL DIAGNOSTIC N/A 4/10/2018    Procedure: EGD AND COLONOSCOPY;  Surgeon: Erlinda Baca MD;  [place] [facility];   Service: Gastroenterology     Allergies   Allergen Reactions    Chantix [Varenicline]     Ibuprofen Other (See Comments)     Upset stomach    Lyrica [Pregabalin] Other (See Comments)     bruising    Penicillins Other (See Comments)     ? hives    Sulfa Antibiotics Other (See Comments)     sloughing skin in mouth    Sulfasalazine      Substance Abuse History:    Social History     Substance and Sexual Activity   Alcohol Use Yes    Comment: "occasional glass of wine"     Social History     Substance and Sexual Activity   Drug Use Not Currently    Types: Marijuana, Cocaine    Comment: medical     Social History:    Social History     Socioeconomic History    Marital status: /Civil Union     Spouse name: Not on file    Number of children: Not on file    Years of education: Not on file    Highest education level: Not on file   Occupational History    Occupation: disability   Tobacco Use    Smoking status: Current Every Day Smoker     Packs/day: 0 50     Years: 35 00     Pack years: 17 50     Types: Cigarettes    Smokeless tobacco: Never Used   Vaping Use    Vaping Use: Never used   Substance and Sexual Activity    Alcohol use: Yes     Comment: "occasional glass of wine"    Drug use: Not Currently     Types: Marijuana, Cocaine     Comment: medical    Sexual activity: Yes     Partners: Male     Birth control/protection: Post-menopausal     Comment: PT is    Other Topics Concern    Not on file   Social History Narrative    Lives with spouse     Social Determinants of Health     Financial Resource Strain: Not on file   Food Insecurity: Not on file   Transportation Needs: Not on file   Physical Activity: Not on file   Stress: Not on file   Social Connections: Not on file   Intimate Partner Violence: Not on file   Housing Stability: Not on file     Family Psychiatric History:     Family History   Problem Relation Age of Onset    Arthritis Mother     Coronary artery disease Mother         MI in her 63's    Parkinsonism Father         with falls and SDH    Parkinsonism Paternal Grandmother     Coronary artery disease Paternal Grandfather     Parkinsonism Paternal Aunt     Colon cancer Family     Depression Neg Hx      History Review: The following portions of the patient's history were reviewed and updated as appropriate: allergies, current medications, past family history, past medical history, past social history, past surgical history and problem list     OBJECTIVE:     Vital signs in last 24 hours: There were no vitals filed for this visit  Laboratory Results: I have personally reviewed all pertinent laboratory/tests results      Suicide/Homicide Risk Assessment:    Risk of Harm to Self:   The following ratings are based on assessment at the time of the interview   Recent Specific Risk Factors include: none    Risk of Harm to Others:   The following ratings are based on assessment at the time of the interview   Recent Specific Risk Factors include: none  The following interventions are recommended: no intervention changes needed    Medications Risks/Benefits:      Risks, Benefits And Possible Side Effects Of Medications:    Discussed risks and benefits of treatment with patient including risk of suicidality, serotonin syndrome, increased QTc interval and SIADH related to treatment with antidepressants; Risk of induction of manic symptoms in certain patient populations, risk of parkinsonian symptoms, metabolic syndrome, tardive dyskinesia and neuroleptic malignant syndrome related to treatment with antipsychotic medications and Risk of sedation, dizziness and CNS depression during treatment with Gabapentin     Controlled Medication Discussion:     Arcadio Vizcaino has been filling controlled prescriptions on time as prescribed according to [place] Prescription Drug Monitoring Program    Treatment Plan:    Due for update/Updated:   yes    CARLITOS Fountain 07/13/22    This note was shared with patient

## 2022-07-14 ENCOUNTER — OFFICE VISIT (OUTPATIENT)
Dept: FAMILY MEDICINE CLINIC | Facility: CLINIC | Age: 52
End: 2022-07-14
Payer: COMMERCIAL

## 2022-07-14 VITALS
BODY MASS INDEX: 21.73 KG/M2 | SYSTOLIC BLOOD PRESSURE: 130 MMHG | DIASTOLIC BLOOD PRESSURE: 80 MMHG | HEART RATE: 100 BPM | WEIGHT: 135.2 LBS | HEIGHT: 66 IN

## 2022-07-14 DIAGNOSIS — Z12.31 ENCOUNTER FOR SCREENING MAMMOGRAM FOR MALIGNANT NEOPLASM OF BREAST: ICD-10-CM

## 2022-07-14 DIAGNOSIS — R73.9 HYPERGLYCEMIA: ICD-10-CM

## 2022-07-14 DIAGNOSIS — I10 PRIMARY HYPERTENSION: ICD-10-CM

## 2022-07-14 DIAGNOSIS — N94.9 ADNEXAL CYST: ICD-10-CM

## 2022-07-14 DIAGNOSIS — M51.37 DDD (DEGENERATIVE DISC DISEASE), LUMBOSACRAL: Primary | ICD-10-CM

## 2022-07-14 DIAGNOSIS — K21.9 GASTROESOPHAGEAL REFLUX DISEASE WITHOUT ESOPHAGITIS: ICD-10-CM

## 2022-07-14 DIAGNOSIS — E78.2 MIXED HYPERLIPIDEMIA: ICD-10-CM

## 2022-07-14 DIAGNOSIS — J43.9 PULMONARY EMPHYSEMA, UNSPECIFIED EMPHYSEMA TYPE (HCC): ICD-10-CM

## 2022-07-14 DIAGNOSIS — F33.2 SEVERE EPISODE OF RECURRENT MAJOR DEPRESSIVE DISORDER, WITHOUT PSYCHOTIC FEATURES (HCC): ICD-10-CM

## 2022-07-14 PROBLEM — K21.00 ESOPHAGITIS, REFLUX: Status: ACTIVE | Noted: 2022-07-14

## 2022-07-14 PROCEDURE — 99214 OFFICE O/P EST MOD 30 MIN: CPT | Performed by: FAMILY MEDICINE

## 2022-07-14 NOTE — ASSESSMENT & PLAN NOTE
Patient does continue to have significant issues with regard to lumbar disc disease  She is following with neuro surgery  It is now affecting her everyday living  She has problems with mobility, cooking, cleaning, driving  She can stand for longer periods of time  Sitting for longer periods of time causes discomfort  Again, follow with pain management/neuro surgery

## 2022-07-14 NOTE — ASSESSMENT & PLAN NOTE
Fadumo at the moment  Not currently using inhalers  Would recommend that she have albuterol available  Will send 1 to pharmacy  Recommend quit smoking  Patient understands risks of smoking

## 2022-07-14 NOTE — ASSESSMENT & PLAN NOTE
Cholesterol is actually doing quite well, despite the fact that she has a high total cholesterol  Her LDL and HDL were excellent  Limit carbohydrate intake as her triglycerides were elevated, which would include pasta, rice, potatoes, bread, starches, sugars, alcohol

## 2022-07-14 NOTE — ASSESSMENT & PLAN NOTE
Blood sugar was elevated slightly  Limit carbohydrates, which includes pasta, rice, potatoes, breads, sugars

## 2022-07-14 NOTE — ASSESSMENT & PLAN NOTE
Quite significant  Continue with Seroquel that was just added  She has to  the prescription today  Continue on Effexor  In the past, she felt that the Effexor work quite well for her, but she certainly needs to have the augmentation of medication at this point  Patient is following with Psychiatry for this, therefore no changes from this office  Negative SI, positive contract

## 2022-07-14 NOTE — ASSESSMENT & PLAN NOTE
, the patient has tried to schedule for OBGYN visit, but  August was 1st available  Given the issues with her back, she preferred to wait on scheduling for OBGYN  This is reasonable, but does need to be done relatively soon

## 2022-07-14 NOTE — PATIENT INSTRUCTIONS
Problem List Items Addressed This Visit       Adnexal cyst     , the patient has tried to schedule for OBGYN visit, but  August was 1st available  Given the issues with her back, she preferred to wait on scheduling for OBGYN  This is reasonable, but does need to be done relatively soon  COPD (chronic obstructive pulmonary disease) (HCC)     Stable at the moment  Not currently using inhalers  Would recommend that she have albuterol available  Will send 1 to pharmacy  Recommend quit smoking  Patient understands risks of smoking  DDD (degenerative disc disease), lumbosacral - Primary     Patient does continue to have significant issues with regard to lumbar disc disease  She is following with neuro surgery  It is now affecting her everyday living  She has problems with mobility, cooking, cleaning, driving  She can stand for longer periods of time  Sitting for longer periods of time causes discomfort  Again, follow with pain management/neuro surgery  Depression     Quite significant  Continue with Seroquel that was just added  She has to  the prescription today  Continue on Effexor  In the past, she felt that the Effexor work quite well for her, but she certainly needs to have the augmentation of medication at this point  Patient is following with Psychiatry for this, therefore no changes from this office  Negative SI, positive contract  GERD (gastroesophageal reflux disease)     Doing well with omeprazole  No change  Continue  She notes if she does not take the medication she has increased problems  At some point in the future, would recommend follow-up with GI  Unfortunately, with the uncertainty with her back she has not been able to schedule anything yet  Hyperglycemia     Blood sugar was elevated slightly  Limit carbohydrates, which includes pasta, rice, potatoes, breads, sugars               Hyperlipidemia     Cholesterol is actually doing quite well, despite the fact that she has a high total cholesterol  Her LDL and HDL were excellent  Limit carbohydrate intake as her triglycerides were elevated, which would include pasta, rice, potatoes, bread, starches, sugars, alcohol  Hypertension     Blood pressure today was excellent  Not on medication currently  Other Visit Diagnoses       Encounter for screening mammogram for malignant neoplasm of breast        Relevant Orders    Mammo screening bilateral w 3d & cad            COVID 19 Instructions    Jauregui Maximiliano was advised to limit contact with others to essential tasks such as getting food, medications, and medical care  Proper handwashing reviewed, and Hand sanitzer when washing is not available  If the patient develops symptoms of COVID 19, the patient should call the office as soon as possible  For 7603-6044 Flu season, it is strongly recommended that Flu Vaccinations be obtained  Virtual Visits:  Michelle: This works on smart phones (any phone with Internet browsing capability)  You should get a text message when the provider is ready to see you  Click on the link in the text message, and the call should start  You will need to type in your name, and allow camera and microphone access  This is HIPPA compliant, and secure  If you have not already done so, get immunized to COVID 19  We are committed to getting you vaccinated as soon as possible and will be closely following CDC and [facility]  guidelines as they are released and revised  Please refer to our COVID-19 vaccine webpage for the most up to date information on the vaccine and our distribution efforts  This site will also have the most up to date recommendations for COVID booster vaccine      [facility]    Call [phone] ([phone]), option 7    OUR NEW LOCATION:    [place] [address], [address] [place] [address] , [place], [address] [facility]  Fax: [phone]    Lab services and OB/GYN are at this location as well

## 2022-07-14 NOTE — PROGRESS NOTES
Assessment and Plan:    Problem List Items Addressed This Visit     Adnexal cyst     , the patient has tried to schedule for OBGYN visit, but  August was 1st available  Given the issues with her back, she preferred to wait on scheduling for OBGYN  This is reasonable, but does need to be done relatively soon  COPD (chronic obstructive pulmonary disease) (HCC)     Stable at the moment  Not currently using inhalers  Would recommend that she have albuterol available  Will send 1 to pharmacy  Recommend quit smoking  Patient understands risks of smoking  DDD (degenerative disc disease), lumbosacral - Primary     Patient does continue to have significant issues with regard to lumbar disc disease  She is following with neuro surgery  It is now affecting her everyday living  She has problems with mobility, cooking, cleaning, driving  She can stand for longer periods of time  Sitting for longer periods of time causes discomfort  Again, follow with pain management/neuro surgery  Depression     Quite significant  Continue with Seroquel that was just added  She has to  the prescription today  Continue on Effexor  In the past, she felt that the Effexor work quite well for her, but she certainly needs to have the augmentation of medication at this point  Patient is following with Psychiatry for this, therefore no changes from this office  Negative SI, positive contract  GERD (gastroesophageal reflux disease)     Doing well with omeprazole  No change  Continue  She notes if she does not take the medication she has increased problems  At some point in the future, would recommend follow-up with GI  Unfortunately, with the uncertainty with her back she has not been able to schedule anything yet  Hyperglycemia     Blood sugar was elevated slightly  Limit carbohydrates, which includes pasta, rice, potatoes, breads, sugars               Hyperlipidemia Cholesterol is actually doing quite well, despite the fact that she has a high total cholesterol  Her LDL and HDL were excellent  Limit carbohydrate intake as her triglycerides were elevated, which would include pasta, rice, potatoes, bread, starches, sugars, alcohol  Hypertension     Blood pressure today was excellent  Not on medication currently  Other Visit Diagnoses     Encounter for screening mammogram for malignant neoplasm of breast        Relevant Orders    Mammo screening bilateral w 3d & cad                 Diagnoses and all orders for this visit:    DDD (degenerative disc disease), lumbosacral    Mixed hyperlipidemia    Primary hypertension    Hyperglycemia    Severe episode of recurrent major depressive disorder, without psychotic features ([facility] [address] )    Pulmonary emphysema, unspecified emphysema type ([facility] [address] )    Gastroesophageal reflux disease without esophagitis    Adnexal cyst    Encounter for screening mammogram for malignant neoplasm of breast  -     Mammo screening bilateral w 3d & cad; Future            Subjective:      Patient ID: Matti Guardado is a 46 y o  female  CC:    Chief Complaint   Patient presents with    Follow-up     Pt is present today for 1 month follow up       HPI:    Patient called OB  She was sched till aug, but was not sure she wanted to schedule then due to possible surgery, and did not want conflicts  DDD: Severe  She is seeing Neurosurg, as she is having increased problems with mobility now  No drive, stairs are a problem  Standing for long time causes problems  Sitting for longer causes pains as well  COPD:  Patient reports she is doing quite well  Does not have albuterol available  Patient does continue to smoke  Understands risks  Depression:  Patient reports worse lately  She is following with Psychiatry  Seroquel was added  Neg SI  She was in counseling before, but due to costs, can not continue for the moment      GERD:  Patient did have Prilosec added before  She notes that it does help out quite a bit  We did discuss previously about following up with GI  Cholesterol:  Reviewed  Hyperglycemia:  Reviewed labs  The following portions of the patient's history were reviewed and updated as appropriate: allergies, current medications, past family history, past medical history, past social history, past surgical history and problem list       Review of Systems   Constitutional: Negative  HENT: Negative  Respiratory: Negative  Cardiovascular: Negative  Gastrointestinal: Negative            Data to review:       Objective:    Vitals:    07/14/22 1027   BP: 130/80   BP [place] Left arm   Patient Position: Sitting   Cuff Size: Standard   Pulse: 100   Weight: 61 3 kg (135 lb 3 2 oz)   Height: 5' 6" (1 676 m)        Physical Exam

## 2022-07-14 NOTE — ASSESSMENT & PLAN NOTE
Doing well with omeprazole  No change  Continue  She notes if she does not take the medication she has increased problems  At some point in the future, would recommend follow-up with GI  Unfortunately, with the uncertainty with her back she has not been able to schedule anything yet

## 2022-07-20 ENCOUNTER — TELEPHONE (OUTPATIENT)
Dept: FAMILY MEDICINE CLINIC | Facility: CLINIC | Age: 52
End: 2022-07-20

## 2022-07-20 NOTE — TELEPHONE ENCOUNTER
TH, Pt states that you had mentioned calling in a rescue inhaler for pt, Pharmacy never got the script, Can you address this?

## 2022-07-21 ENCOUNTER — TELEPHONE (OUTPATIENT)
Dept: NEUROSURGERY | Facility: CLINIC | Age: 52
End: 2022-07-21

## 2022-07-21 NOTE — TELEPHONE ENCOUNTER
Patient called the nurse line stating that she has an appointment on Monday 7/25 with Dr Kavita Del Cid but she doesn't know what to do about her pain in the meantime  She has gone to the ED in the past, but she said they will not prescribe her anything because she is established with a pain mgmt doctor  She said she also reached out to her PCP office and they suggested that she calls us  I apologized for her pain but explained how we only prescribe pain medication during the acute post-operative period  I recommended that she try reaching out to her pain mgmt physician in the meantime but if her pain becomes unbearable or if she is unsafe at home, she should report to her nearest ED  Patient was appreciative for the coordination

## 2022-07-25 ENCOUNTER — OFFICE VISIT (OUTPATIENT)
Dept: NEUROSURGERY | Facility: CLINIC | Age: 52
End: 2022-07-25
Payer: COMMERCIAL

## 2022-07-25 VITALS
SYSTOLIC BLOOD PRESSURE: 142 MMHG | WEIGHT: 131 LBS | TEMPERATURE: 99.7 F | DIASTOLIC BLOOD PRESSURE: 92 MMHG | HEIGHT: 66 IN | HEART RATE: 109 BPM | BODY MASS INDEX: 21.05 KG/M2 | RESPIRATION RATE: 16 BRPM

## 2022-07-25 DIAGNOSIS — M41.50 DEGENERATIVE SCOLIOSIS IN ADULT PATIENT: Primary | ICD-10-CM

## 2022-07-25 DIAGNOSIS — M54.41 CHRONIC RIGHT-SIDED LOW BACK PAIN WITH RIGHT-SIDED SCIATICA: ICD-10-CM

## 2022-07-25 DIAGNOSIS — G89.29 CHRONIC RIGHT-SIDED LOW BACK PAIN WITH RIGHT-SIDED SCIATICA: ICD-10-CM

## 2022-07-25 DIAGNOSIS — M48.061 FORAMINAL STENOSIS OF LUMBAR REGION: ICD-10-CM

## 2022-07-25 PROCEDURE — 99213 OFFICE O/P EST LOW 20 MIN: CPT | Performed by: STUDENT IN AN ORGANIZED HEALTH CARE EDUCATION/TRAINING PROGRAM

## 2022-07-25 RX ORDER — CHLORHEXIDINE GLUCONATE 0.12 MG/ML
15 RINSE ORAL ONCE
Status: CANCELLED | OUTPATIENT
Start: 2022-07-25 | End: 2022-07-25

## 2022-07-25 RX ORDER — SODIUM CHLORIDE 9 MG/ML
125 INJECTION, SOLUTION INTRAVENOUS CONTINUOUS
Status: CANCELLED | OUTPATIENT
Start: 2022-07-25

## 2022-07-25 RX ORDER — CEFAZOLIN SODIUM 1 G/50ML
1000 SOLUTION INTRAVENOUS ONCE
Status: CANCELLED | OUTPATIENT
Start: 2022-07-25 | End: 2022-07-25

## 2022-07-25 NOTE — PROGRESS NOTES
Office Note - Neurosurgery   Jh Valladares 46 y o  female MRN: [id number]      Assessment and Plan: This is a 61-year-old female with history of chronic right-sided low back pain and right leg pain presenting for follow-up and surgical discussion  MRI of her lumbar spine was reviewed with the patient and her  again  Shows diffuse degeneration most significantly at L4-5 and L5-S1 where the patient has uneven degeneration of the disc space causing dextroscoliosis  There is severe right-sided L5-S1 foraminal stenosis  Standing scoliosis films were reviewed with the patient and her   It demonstrates dextroscoliosis measuring 36° however the patient maintains her coronal and sagittal balance  I had a long discussion with the patient about her symptoms as well as imaging findings  The patient's symptoms are primarily right leg pain which appears to be in the L5 distribution  This fits very well with the severe right L5-S1 foraminal stenosis she has  Because she does not have any significant low back pain, I do not believe her scoliosis is playing a factor here  The patient also states this is the pain that is debilitating to her and would like that addressed if possible  As such, I will be holding off on deformity corrective surgery  My plan is to perform L5-S1 ALIF  The patient has a DEXA scan scheduled for the beginning of August   If she is not osteoporotic, my plan will be to perform an anterior approach alone and keep the patient in the brace  If she is osteoporotic, then I would place posterior instrumentation  The patient also had a pancreatic pseudocyst removed many years back and has a linear supra umbilical scar  She has also had 1  in the past   I will touch base with Dr Nigel Nielsen to see if these may create an issue for the anterior approach  If this is an issue, then the plan would be to proceed with a posterior approach      I sat down with the patient and her  and had an extensive discussion about the procedure including the details of the procedure as well as it's risks and benefits  Risks of the procedure include but are not limited to bleeding, infection, nerve injury which can lead to extremity weakness, numbness, tingling, paralysis, and/or bowel/bladder dysfunction  There is also risk of CSF leak which may require additional procedures to repair  There is risk of injury to the great retroperitoneal vessels which may require vascular surgery intervention  Major procedure related risks include adjacent segment disease, hardware failure, and the need for further future surgeries  There is also anesthesia related risks which include blood clots in the legs/lungs, heart attack, stroke, coma and death  The patient provided verbal consent to the surgical procedure and signed the consent form  Expected postoperative course, including activity restrictions, expected pain and postoperative medication were reviewed  In the meantime, the patient will obtain medical clearance from their primary care physician  The patient is to call the office with any questions  History, physical examination and diagnostic tests were reviewed and questions answered  Diagnosis, care plan and treatment options were discussed  The patient and spouse/SO understand instructions and will follow up as directed      Follow-up:  Schedule for procedure    Diagnoses and all orders for this visit:    Degenerative scoliosis in adult patient  -     Case request operating room: L5-S1 anterior lumbar interbody fusion, possible posterior interbody fusion, possible posterior percutaneous pedicle screw placement, with neuromonitoring and robotic guidance; Standing  -     Case request operating room: L5-S1 anterior lumbar interbody fusion, possible posterior interbody fusion, possible posterior percutaneous pedicle screw placement, with neuromonitoring and robotic guidance    Chronic right-sided low back pain with right-sided sciatica  -     Case request operating room: L5-S1 anterior lumbar interbody fusion, possible posterior interbody fusion, possible posterior percutaneous pedicle screw placement, with neuromonitoring and robotic guidance; Standing  -     Case request operating room: L5-S1 anterior lumbar interbody fusion, possible posterior interbody fusion, possible posterior percutaneous pedicle screw placement, with neuromonitoring and robotic guidance    Foraminal stenosis of lumbar region  -     Case request operating room: L5-S1 anterior lumbar interbody fusion, possible posterior interbody fusion, possible posterior percutaneous pedicle screw placement, with neuromonitoring and robotic guidance; Standing  -     Case request operating room: L5-S1 anterior lumbar interbody fusion, possible posterior interbody fusion, possible posterior percutaneous pedicle screw placement, with neuromonitoring and robotic guidance    Other orders  -     Diet NPO; Sips with meds; Standing  -     Nursing Communication [address] [place] [address] Interventions Implemented; Standing  -     Nursing Communication CH bath, have staff wash entire body (neck down) per pre-op bathing protocol  Routine, evening prior to, and day of surgery ; Standing  -     Nursing Communication Swab both nares with Povidone-Iodine solution, EXCLUDE if patient has shellfish/Iodine allergy  Routine, day of surgery, on call to OR; Standing  -     chlorhexidine (PERIDEX) 0 12 % oral rinse 15 mL  -     Void on call to OR; Standing  -     Insert peripheral IV; Standing  -     sodium chloride 0 9 % infusion  -     ceFAZolin (ANCEF) IVPB (premix in dextrose) 1,000 mg 50 mL        Subjective/Objective     Chief Complaint    Right-sided low back pain    HPI    This is a 67-year-old female with history of chronic low back pain and right leg pain presenting for follow-up visit  The patient's history is well detailed in our prior clinic visit note    In brief, she has suffered low back issues for very long time  Her primary complaint is pain that radiates from the right-sided lower back down into the right buttock to the posterolateral aspect of her thigh to the lateral aspect of her leg to her foot  It is associated with numbness and tingling in her foot  The pain is debilitating and prevents her from performing any of her usual activities  She is not able to stand for any prolonged period of time  Rest in bed helps alleviate her symptoms  Sitting and standing make the symptoms worse  During our last clinic visit, I referred the patient to pain management as well as physical therapy  She had injections and has undergone physical therapy and states they did not help with her symptoms  She is here to discuss alternative treatments  During our last visit, I also advised the patient to quit smoking  She states she is down to half a pack a day  MARY HERNANDEZ personally reviewed and updated  Review of Systems   Constitutional: Negative  HENT: Negative  Eyes: Negative  Respiratory:        Chronic brontchitis   Cardiovascular: Negative  Gastrointestinal: Negative  Pain at times when moving bowels   Endocrine: Negative  Genitourinary: Negative  Musculoskeletal: Positive for back pain (LBP// now radiating to hip//, into right leg shooting down back to knee// inner right thigh) and gait problem (prolonged standing is difficult with pain)  Discussed foraminotomies at L4-5 and L5-S1 to treat the right leg pain versus L3-S1 fusion with interbodies at L4-5 and L5-S1  Rhizotomy 1/19/2022  12/3/2021 last injection  PT  PM through comprehensive pain   Allergic/Immunologic: Negative  Neurological: Positive for weakness (right leg) and numbness (right upper thigh/outside with now moving into groin)  Psychiatric/Behavioral: Negative          Family History    Family History   Problem Relation Age of Onset    Arthritis Mother     Coronary artery disease Mother         MI in her 63's    Parkinsonism Father         with falls and SDH    Parkinsonism Paternal Grandmother     Coronary artery disease Paternal Grandfather     Parkinsonism Paternal Aunt     Colon cancer Family     Depression Neg Hx        Social History    Social History     Socioeconomic History    Marital status: /Civil Union     Spouse name: Not on file    Number of children: Not on file    Years of education: Not on file    Highest education level: Not on file   Occupational History    Occupation: disability   Tobacco Use    Smoking status: Current Every Day Smoker     Packs/day: 0 50     Years: 35 00     Pack years: 17 50     Types: Cigarettes    Smokeless tobacco: Never Used   Vaping Use    Vaping Use: Never used   Substance and Sexual Activity    Alcohol use: Yes     Comment: "occasional glass of wine"    Drug use: Not Currently     Types: Marijuana, Cocaine     Comment: medical    Sexual activity: Yes     Partners: Male     Birth control/protection: Post-menopausal     Comment: PT is    Other Topics Concern    Not on file   Social History Narrative    Lives with spouse     Social Determinants of Health     Financial Resource Strain: Not on file   Food Insecurity: Not on file   Transportation Needs: Not on file   Physical Activity: Not on file   Stress: Not on file   Social Connections: Not on file   Intimate Partner Violence: Not on file   Housing Stability: Not on file       Past Medical History    Past Medical History:   Diagnosis Date    Chronic pain disorder     Depression     GERD (gastroesophageal reflux disease)     History of electroconvulsive therapy     Low back pain     Self-injurious behavior     Suicide attempt ([facility] [address] )        Surgical History    Past Surgical History:   Procedure Laterality Date     SECTION      COLONOSCOPY      PANCREAS SURGERY      "pseudocysts" per patient's  Ricki Desai ESOPHAGOGASTRODUODENOSCOPY TRANSORAL DIAGNOSTIC N/A 4/10/2018    Procedure: EGD AND COLONOSCOPY;  Surgeon: Elder Francisco MD;  [facility];   Service: Gastroenterology       Medications      Current Outpatient Medications:     eszopiclone (LUNESTA) 2 mg tablet, Take 1 tablet (2 mg total) by mouth daily at bedtime as needed for sleep Take immediately before bedtime, Disp: 30 tablet, Rfl: 0    gabapentin (NEURONTIN) 300 mg capsule, Take 300 mg by mouth 4 (four) times a day, Disp: , Rfl:     methocarbamol (ROBAXIN) 500 mg tablet, Take 1 tablet (500 mg total) by mouth 2 (two) times a day as needed for muscle spasms, Disp: 20 tablet, Rfl: 0    Nucynta 50 MG tablet, Take 50 mg by mouth 3 (three) times a day as needed, Disp: , Rfl:     omeprazole (PriLOSEC) 40 MG capsule, Take 1 capsule (40 mg total) by mouth daily Take 1 capsule po in the am 30 min prior to eating prn, Disp: 30 capsule, Rfl: 2    QUEtiapine (SEROquel) 50 mg tablet, Take half or whole table by mouth daily at bedtime, Disp: 30 tablet, Rfl: 3    venlafaxine (EFFEXOR-XR) 150 mg 24 hr capsule, Take 1 capsule (150 mg total) by mouth in the morning , Disp: 90 capsule, Rfl: 1    venlafaxine (EFFEXOR-XR) 37 5 mg 24 hr capsule, Take 1 capsule (37 5 mg total) by mouth in the morning , Disp: 30 capsule, Rfl: 2    diclofenac (VOLTAREN) 75 mg EC tablet, Take 75 mg by mouth 2 (two) times a day (Patient not taking: Reported on 7/25/2022), Disp: , Rfl:     Allergies    Allergies   Allergen Reactions    Chantix [Varenicline]     Ibuprofen Other (See Comments)     Upset stomach    Lyrica [Pregabalin] Other (See Comments)     bruising    Penicillins Other (See Comments)     ? hives    Sulfa Antibiotics Other (See Comments)     sloughing skin in mouth    Sulfasalazine        The following portions of the patient's history were reviewed and updated as appropriate: allergies, current medications, past family history, past medical history, past social history, past surgical history and problem list     Investigations    I personally reviewed the MRI and XRAY results with the patient:      Physical Exam    Vitals:  Blood pressure 142/92, pulse (!) 109, temperature 99 7 °F (37 6 °C), temperature source Tympanic, resp  rate 16, height 5' 6" (1 676 m), weight 59 4 kg (131 lb), last menstrual period 03/14/2019 ,Body mass index is 21 14 kg/m²      General:  Normal, well developed, not in distress/pain     Skin:   No issues, no rashes noted     Musculoskeletal:   5/5 strength throughout all muscle groups  No tenderness to palpation of the spine       Neurologic Exam:  Awake and alert  Oriented x3  Speech clear and fluent  ATKINSON   Sensation to light touch and pin prick intact throughout  No vaughn's  No clonus  2+ patellar reflexes     Gait:   normal gait, normal posture

## 2022-08-02 ENCOUNTER — HOSPITAL ENCOUNTER (OUTPATIENT)
Dept: BONE DENSITY | Facility: MEDICAL CENTER | Age: 52
Discharge: HOME | End: 2022-08-02
Payer: COMMERCIAL

## 2022-08-02 DIAGNOSIS — Z13.820 OSTEOPOROSIS SCREENING: ICD-10-CM

## 2022-08-02 DIAGNOSIS — Z78.0 POST-MENOPAUSE: ICD-10-CM

## 2022-08-02 PROCEDURE — 77080 DXA BONE DENSITY AXIAL: CPT

## 2022-08-10 ENCOUNTER — CONSULT (OUTPATIENT)
Dept: SURGERY | Facility: CLINIC | Age: 52
End: 2022-08-10
Payer: COMMERCIAL

## 2022-08-10 VITALS
HEART RATE: 122 BPM | SYSTOLIC BLOOD PRESSURE: 126 MMHG | WEIGHT: 129 LBS | DIASTOLIC BLOOD PRESSURE: 86 MMHG | BODY MASS INDEX: 20.73 KG/M2 | HEIGHT: 66 IN

## 2022-08-10 DIAGNOSIS — M51.37 DDD (DEGENERATIVE DISC DISEASE), LUMBOSACRAL: Primary | ICD-10-CM

## 2022-08-10 PROCEDURE — 99242 OFF/OP CONSLTJ NEW/EST SF 20: CPT | Performed by: SURGERY

## 2022-08-10 NOTE — PROGRESS NOTES
Office Visit - General Surgery  Citlali Lambert MRN: [id number]  Encounter: [id number]    Assessment and Plan  Problem List Items Addressed This Visit        Musculoskeletal and Integument    DDD (degenerative disc disease), lumbosacral - Primary     I spoke to her about exposure for anterior spine surgery  I think she would be a candidate  The areas were surgery was done should not really affect where we need to go  Will let Neurosurgery know that everything should be ago  Chief Complaint:  Citlali Lambert is a 46 y o  female who presents for Advice Only    Subjective  46year old female who has been following with your surgery and plans were for anterior exposure for L5-S1 surgery  She has previously had pancreatic surgery and a   Past Medical History:   Diagnosis Date    Chronic pain disorder     Depression     GERD (gastroesophageal reflux disease)     History of electroconvulsive therapy     Low back pain     Self-injurious behavior     Suicide attempt [facility])        Past Surgical History:   Procedure Laterality Date     SECTION      COLONOSCOPY      PANCREAS SURGERY      "pseudocysts" per patient's  Ricki Olson    [id number] ESOPHAGOGASTRODUODENOSCOPY TRANSORAL DIAGNOSTIC N/A 4/10/2018    Procedure: EGD AND COLONOSCOPY;  Surgeon: Max Barger MD;  [facility];   Service: Gastroenterology       Family History   Problem Relation Age of Onset    Arthritis Mother     Coronary artery disease Mother         MI in her 63's    Parkinsonism Father         with falls and SDH    Parkinsonism Paternal Grandmother     Coronary artery disease Paternal Grandfather     Parkinsonism Paternal [de-identified]     Colon cancer Family     Depression Neg Hx        Social History     Tobacco Use    Smoking status: Current Every Day Smoker     Packs/day: 0 50     Years: 35 00     Pack years: 17 50     Types: Cigarettes    Smokeless tobacco: Never Used   Vaping Use    Vaping Use: Never used   Substance Use Topics    Alcohol use: Yes     Comment: "occasional glass of wine"    Drug use: Not Currently     Types: Marijuana, Cocaine     Comment: medical        Medications  Current Outpatient Medications on File Prior to Visit   Medication Sig Dispense Refill    eszopiclone (LUNESTA) 2 mg tablet Take 1 tablet (2 mg total) by mouth daily at bedtime as needed for sleep Take immediately before bedtime 30 tablet 0    gabapentin (NEURONTIN) 300 mg capsule Take 300 mg by mouth 4 (four) times a day      methocarbamol (ROBAXIN) 500 mg tablet Take 1 tablet (500 mg total) by mouth 2 (two) times a day as needed for muscle spasms 20 tablet 0    Nucynta 50 MG tablet Take 50 mg by mouth 3 (three) times a day as needed      omeprazole (PriLOSEC) 40 MG capsule Take 1 capsule (40 mg total) by mouth daily Take 1 capsule po in the am 30 min prior to eating prn 30 capsule 2    QUEtiapine (SEROquel) 50 mg tablet Take half or whole table by mouth daily at bedtime 30 tablet 3    venlafaxine (EFFEXOR-XR) 150 mg 24 hr capsule Take 1 capsule (150 mg total) by mouth in the morning  90 capsule 1    venlafaxine (EFFEXOR-XR) 37 5 mg 24 hr capsule Take 1 capsule (37 5 mg total) by mouth in the morning  30 capsule 2    diclofenac (VOLTAREN) 75 mg EC tablet Take 75 mg by mouth 2 (two) times a day (Patient not taking: No sig reported)       No current facility-administered medications on file prior to visit  Allergies  Allergies   Allergen Reactions    Chantix [Varenicline]     Ibuprofen Other (See Comments)     Upset stomach    Lyrica [Pregabalin] Other (See Comments)     bruising    Penicillins Other (See Comments)     ? hives    Sulfa Antibiotics Other (See Comments)     sloughing skin in mouth    Sulfasalazine        Review of Systems    Objective  Vitals:    08/10/22 0826   BP: 126/86   Pulse: (!) 122       Physical Exam  Abdomen:  Upper midline incision all above the umbilicus    Low transverse incision both well healed    Abdomen is soft nontender

## 2022-08-10 NOTE — ASSESSMENT & PLAN NOTE
I spoke to her about exposure for anterior spine surgery  I think she would be a candidate  The areas were surgery was done should not really affect where we need to go  Will let Neurosurgery know that everything should be ago

## 2022-08-10 NOTE — LETTER
August 10, 2022     MD Damon Hurtadoe [phone]    Patient: Matti Guardado   YOB: 1970   Date of Visit: 8/10/2022       Dear Dr Vance Parent:    Thank you for referring Matti Guardado to me for evaluation  Below are my notes for this consultation  If you have questions, please do not hesitate to call me  I look forward to following your patient along with you  Sincerely,        Rossy Lockhart MD        CC: MD Rossy Nguyen MD  8/10/2022  8:58 AM  Sign when Signing Visit  Office Visit - General Surgery  Matti Guardado MRN: [id number]  Encounter: [id number]    Assessment and Plan  Problem List Items Addressed This Visit        Musculoskeletal and Integument    DDD (degenerative disc disease), lumbosacral - Primary     I spoke to her about exposure for anterior spine surgery  I think she would be a candidate  The areas were surgery was done should not really affect where we need to go  Will let Neurosurgery know that everything should be ago  Chief Complaint:  Matti Guardado is a 46 y o  female who presents for Advice Only    Subjective  46year old female who has been following with your surgery and plans were for anterior exposure for L5-S1 surgery  She has previously had pancreatic surgery and a   Past Medical History:   Diagnosis Date    Chronic pain disorder     Depression     GERD (gastroesophageal reflux disease)     History of electroconvulsive therapy     Low back pain     Self-injurious behavior     [facility])        Past Surgical History:   Procedure Laterality Date     SECTION      COLONOSCOPY      PANCREAS SURGERY      "pseudocysts" per patient's  Ricki Olson    [place] ESOPHAGOGASTRODUODENOSCOPY TRANSORAL DIAGNOSTIC N/A 4/10/2018    Procedure: EGD AND COLONOSCOPY;  Surgeon: Rosalina Brewer MD;  [facility];   Service: Gastroenterology Family History   Problem Relation Age of Onset    Arthritis Mother     Coronary artery disease Mother         MI in her 63's    Parkinsonism Father         with falls and SDH    Parkinsonism Paternal Grandmother     Coronary artery disease Paternal Grandfather     Parkinsonism Paternal [de-identified]     Colon cancer Family     Depression Neg Hx        Social History     Tobacco Use    Smoking status: Current Every Day Smoker     Packs/day: 0 50     Years: 35 00     Pack years: 17 50     Types: Cigarettes    Smokeless tobacco: Never Used   Vaping Use    Vaping Use: Never used   Substance Use Topics    Alcohol use: Yes     Comment: "occasional glass of wine"    Drug use: Not Currently     Types: Marijuana, Cocaine     Comment: medical        Medications  Current Outpatient Medications on File Prior to Visit   Medication Sig Dispense Refill    eszopiclone (LUNESTA) 2 mg tablet Take 1 tablet (2 mg total) by mouth daily at bedtime as needed for sleep Take immediately before bedtime 30 tablet 0    gabapentin (NEURONTIN) 300 mg capsule Take 300 mg by mouth 4 (four) times a day      methocarbamol (ROBAXIN) 500 mg tablet Take 1 tablet (500 mg total) by mouth 2 (two) times a day as needed for muscle spasms 20 tablet 0    Nucynta 50 MG tablet Take 50 mg by mouth 3 (three) times a day as needed      omeprazole (PriLOSEC) 40 MG capsule Take 1 capsule (40 mg total) by mouth daily Take 1 capsule po in the am 30 min prior to eating prn 30 capsule 2    QUEtiapine (SEROquel) 50 mg tablet Take half or whole table by mouth daily at bedtime 30 tablet 3    venlafaxine (EFFEXOR-XR) 150 mg 24 hr capsule Take 1 capsule (150 mg total) by mouth in the morning  90 capsule 1    venlafaxine (EFFEXOR-XR) 37 5 mg 24 hr capsule Take 1 capsule (37 5 mg total) by mouth in the morning   30 capsule 2    diclofenac (VOLTAREN) 75 mg EC tablet Take 75 mg by mouth 2 (two) times a day (Patient not taking: No sig reported)       No current facility-administered medications on file prior to visit  Allergies  Allergies   Allergen Reactions    Chantix [Varenicline]     Ibuprofen Other (See Comments)     Upset stomach    Lyrica [Pregabalin] Other (See Comments)     bruising    Penicillins Other (See Comments)     ? hives    Sulfa Antibiotics Other (See Comments)     sloughing skin in mouth    Sulfasalazine        Review of Systems    Objective  Vitals:    08/10/22 0826   BP: 126/86   Pulse: (!) 122       Physical Exam  Abdomen:  Upper midline incision all above the umbilicus  Low transverse incision both well healed    Abdomen is soft nontender

## 2022-08-11 DIAGNOSIS — F41.1 GENERALIZED ANXIETY DISORDER: ICD-10-CM

## 2022-08-11 DIAGNOSIS — F33.2 SEVERE EPISODE OF RECURRENT MAJOR DEPRESSIVE DISORDER, WITHOUT PSYCHOTIC FEATURES (HCC): ICD-10-CM

## 2022-08-11 RX ORDER — VENLAFAXINE HYDROCHLORIDE 37.5 MG/1
CAPSULE, EXTENDED RELEASE ORAL
Qty: 90 CAPSULE | Refills: 0 | Status: ON HOLD | OUTPATIENT
Start: 2022-08-11

## 2022-08-15 DIAGNOSIS — K21.00 ESOPHAGITIS, REFLUX: ICD-10-CM

## 2022-08-15 NOTE — TELEPHONE ENCOUNTER
Requested Prescriptions     Pending Prescriptions Disp Refills    omeprazole (PriLOSEC) 40 MG capsule [Pharmacy Med Name: Omeprazole Oral Capsule Delayed Release 40 MG] 30 capsule 0     Sig: TAKE ONE CAPSULE BY MOUTH DAILY in the morning 30 moniutes prior to eating as needed       LOV 7/14/22, F/U 10/14/22, labs active

## 2022-08-16 RX ORDER — OMEPRAZOLE 40 MG/1
CAPSULE, DELAYED RELEASE ORAL
Qty: 30 CAPSULE | Refills: 0 | Status: SHIPPED | OUTPATIENT
Start: 2022-08-16 | End: 2022-09-13

## 2022-09-07 ENCOUNTER — TELEMEDICINE (OUTPATIENT)
Dept: NEUROSURGERY | Facility: CLINIC | Age: 52
End: 2022-09-07
Payer: COMMERCIAL

## 2022-09-07 DIAGNOSIS — F17.200 NICOTINE DEPENDENCE: Primary | ICD-10-CM

## 2022-09-07 DIAGNOSIS — G89.29 CHRONIC RIGHT-SIDED LOW BACK PAIN WITH RIGHT-SIDED SCIATICA: ICD-10-CM

## 2022-09-07 DIAGNOSIS — M54.16 LUMBAR RADICULOPATHY: Primary | ICD-10-CM

## 2022-09-07 DIAGNOSIS — M54.41 CHRONIC RIGHT-SIDED LOW BACK PAIN WITH RIGHT-SIDED SCIATICA: ICD-10-CM

## 2022-09-07 PROCEDURE — 99213 OFFICE O/P EST LOW 20 MIN: CPT | Performed by: STUDENT IN AN ORGANIZED HEALTH CARE EDUCATION/TRAINING PROGRAM

## 2022-09-07 RX ORDER — NICOTINE 21 MG/24HR
1 PATCH, TRANSDERMAL 24 HOURS TRANSDERMAL EVERY 24 HOURS
Qty: 28 PATCH | Refills: 1 | Status: ON HOLD | OUTPATIENT
Start: 2022-09-07

## 2022-09-07 NOTE — TELEPHONE ENCOUNTER
Received request from Dr Kavita Del Cid to place order for nicotine patches  Placed order as requested

## 2022-09-07 NOTE — PROGRESS NOTES
Virtual Regular Visit    Verification of patient location:    Patient is located in the following state in which I hold an active license [place]      Assessment/Plan: This is a 49-year-old female with history of chronic low back pain and right leg pain presenting for follow-up visit  MRI of her lumbar spine again was reviewed which demonstrates degeneration most significantly at L4-5 and L5-S1 where she has asymmetric degeneration with foraminal stenosis on the right  She also had a DEXA scan performed which shows normal bone density  In our prior meetings, I believe the patient's symptoms were primarily in the L5 distribution which is why we scheduled her to have the L5-S1 ALIF  In regards to her scoliosis, I do not believe surgical intervention to correct it was necessary  I believe her symptoms were primarily from the stenosis she has at L5-S1  The scoliosis may be contributing to some of her lower back pain  Even with that, I do not believe it would warrant such an extensive surgery  The patient is now having numbness in the medial aspect of her leg which correlates more with L4 radiculopathy  She does have L4-5 right-sided foraminal stenosis as well as L3-4 mild-to-moderate central canal stenosis  It is not fully clear how much all of these other levels are contributing to her current symptoms  As such I will like to hold off on performing the ALIF  I would like to bring the patient back for physical follow-up visit so that I can best evaluate and see how best to proceed  I believe her symptoms are evolving and she may be becoming symptomatic from the disease at L4-5 and potentially at L3-4  I will see her for follow-up visit  The patient continues to smoke cigarettes  I advised her to quit smoking if we are going to proceed with any extensive surgery  I would not proceed with the surgery if she continues to smoke as this is a huge risk for nonunion and hardware failure    Problem List Items Addressed This Visit    None              Reason for visit is   Chief Complaint   Patient presents with    Virtual Regular Visit        Encounter provider Malini Domínguez MD    Provider located at [address] [place]  [address] [facility] [id number] [phone]      Recent Visits  No visits were found meeting these conditions  Showing recent visits within past 7 days and meeting all other requirements  Future Appointments  No visits were found meeting these conditions  Showing future appointments within next 150 days and meeting all other requirements       The patient was identified by name and date of birth  Brookie Felty was informed that this is a telemedicine visit and that the visit is being conducted through [address] [place] [address] Now and patient was informed that this is a secure, HIPAA-compliant platform  She agrees to proceed     My office door was closed  No one else was in the room  She acknowledged consent and understanding of privacy and security of the video platform  The patient has agreed to participate and understands they can discontinue the visit at any time  Patient is aware this is a billable service  Subjective  Brookie Felty is a 45 y o  female with history of chronic low back pain and right leg pain presenting for follow-up  The patient is scheduled to have L5-S1 ALIF surgery with me early October  The patient had changes with her symptoms and also had questions regarding her surgery hence the virtual visit  She states she is had chronic right leg pain along with numbness on the lateral aspect of her leg  Over the course of a few days to weeks, she is began to experience the numbness going into the medial aspect of her leg as well as to her foot  Her pain has also been increasing in the same distribution    She had questions about her scoliosis and whether we needed to fix her scoliosis as she is concerned it may get worse as she gets older  HPI     Past Medical History:   Diagnosis Date    Chronic pain disorder     Depression     GERD (gastroesophageal reflux disease)     History of electroconvulsive therapy     Low back pain     Self-injurious behavior     Suicide attempt [facility])        Past Surgical History:   Procedure Laterality Date     SECTION      COLONOSCOPY      PANCREAS SURGERY      "pseudocysts" per patient's  Ricki Lion    [place] ESOPHAGOGASTRODUODENOSCOPY TRANSORAL DIAGNOSTIC N/A 4/10/2018    Procedure: EGD AND COLONOSCOPY;  Surgeon: Jone Cazares MD;  [place] [facility]; Service: Gastroenterology       Current Outpatient Medications   Medication Sig Dispense Refill    diclofenac (VOLTAREN) 75 mg EC tablet Take 75 mg by mouth 2 (two) times a day (Patient not taking: No sig reported)      eszopiclone (LUNESTA) 2 mg tablet Take 1 tablet (2 mg total) by mouth daily at bedtime as needed for sleep Take immediately before bedtime 30 tablet 0    gabapentin (NEURONTIN) 300 mg capsule Take 300 mg by mouth 4 (four) times a day      methocarbamol (ROBAXIN) 500 mg tablet Take 1 tablet (500 mg total) by mouth 2 (two) times a day as needed for muscle spasms 20 tablet 0    Nucynta 50 MG tablet Take 50 mg by mouth 3 (three) times a day as needed      omeprazole (PriLOSEC) 40 MG capsule TAKE ONE CAPSULE BY MOUTH DAILY in the morning 30 moniutes prior to eating as needed  30 capsule 0    QUEtiapine (SEROquel) 50 mg tablet Take half or whole table by mouth daily at bedtime 30 tablet 3    venlafaxine (EFFEXOR-XR) 150 mg 24 hr capsule Take 1 capsule (150 mg total) by mouth in the morning  90 capsule 1    venlafaxine (EFFEXOR-XR) 37 5 mg 24 hr capsule TAKE ONE CAPSULE BY MOUTH EVERY MORNING 90 capsule 0     No current facility-administered medications for this visit          Allergies   Allergen Reactions    Chantix [Varenicline]     Ibuprofen Other (See Comments)     Upset stomach    Lyrica [Pregabalin] Other (See Comments)     bruising    Penicillins Other (See Comments)     ? hives    Sulfa Antibiotics Other (See Comments)     sloughing skin in mouth    Sulfasalazine        Review of Systems   Constitutional: Negative  HENT: Negative  Eyes: Negative  Respiratory:        Chronic brontchitis   Cardiovascular: Negative  Gastrointestinal: Negative  Pain at times when moving bowels   Endocrine: Negative  Genitourinary: Negative  Musculoskeletal: Positive for back pain (LBP// now radiating to hip//, into right leg shooting down back to knee// inner right thigh) and gait problem (prolonged standing is difficult with pain)  Discussed foraminotomies at L4-5 and L5-S1 to treat the right leg pain versus L3-S1 fusion with interbodies at L4-5 and L5-S1  Rhizotomy 1/19/2022  12/3/2021 last injection  PT  PM through comprehensive pain   Allergic/Immunologic: Negative  Neurological: Positive for weakness (right leg) and numbness (inner right/groin, new whole leg right into foot)  Psychiatric/Behavioral: Negative  Video Exam    There were no vitals filed for this visit      Physical Exam     I spent 25 minutes directly with the patient during this visit

## 2022-09-12 ENCOUNTER — OFFICE VISIT (OUTPATIENT)
Dept: NEUROSURGERY | Facility: CLINIC | Age: 52
End: 2022-09-12
Payer: COMMERCIAL

## 2022-09-12 VITALS
DIASTOLIC BLOOD PRESSURE: 52 MMHG | HEIGHT: 66 IN | TEMPERATURE: 99.8 F | RESPIRATION RATE: 16 BRPM | HEART RATE: 72 BPM | BODY MASS INDEX: 20.73 KG/M2 | SYSTOLIC BLOOD PRESSURE: 90 MMHG | WEIGHT: 129 LBS

## 2022-09-12 DIAGNOSIS — M54.16 LUMBAR RADICULOPATHY: Primary | ICD-10-CM

## 2022-09-12 DIAGNOSIS — G89.29 CHRONIC RIGHT-SIDED LOW BACK PAIN WITH RIGHT-SIDED SCIATICA: ICD-10-CM

## 2022-09-12 DIAGNOSIS — M41.50 DEGENERATIVE SCOLIOSIS IN ADULT PATIENT: ICD-10-CM

## 2022-09-12 DIAGNOSIS — M48.061 FORAMINAL STENOSIS OF LUMBAR REGION: ICD-10-CM

## 2022-09-12 DIAGNOSIS — F17.219 CIGARETTE NICOTINE DEPENDENCE WITH NICOTINE-INDUCED DISORDER: ICD-10-CM

## 2022-09-12 DIAGNOSIS — M54.41 CHRONIC RIGHT-SIDED LOW BACK PAIN WITH RIGHT-SIDED SCIATICA: ICD-10-CM

## 2022-09-12 PROCEDURE — 99213 OFFICE O/P EST LOW 20 MIN: CPT | Performed by: STUDENT IN AN ORGANIZED HEALTH CARE EDUCATION/TRAINING PROGRAM

## 2022-09-12 NOTE — PROGRESS NOTES
Office Note - Neurosurgery   Daralene Peabody 45 y o  female MRN: [id number]      Assessment and Plan: This is a 77-year-old female with chronic right-sided lower back and lower extremity pain as well as numbness and tingling  MRI of her lumbar spine was reviewed again with the patient and her   The MRI demonstrates diffuse degeneration with scoliosis  At L3-4, the patient has broad-based disc bulge as well as facet arthropathy and ligamentous hypertrophy causing mild to moderate central canal stenosis  At L4-5 and L5-S1, the patient has a symmetric disc degeneration prominently on the right which is causing severe right-sided foraminal stenoses  I had a long discussion with the patient and her  about her condition as well as imaging findings  Initially the patient only reported right L5 and S1 distribution pain which caused me to focus on L5-S1 as the focal point of treatment  During our last virtual visit, she was done complaint of L4 distribution paresthesias hence why I brought her in for discussion  I the foraminal stenosis at L4-5 is also contributing to her symptoms  We discussed treatment options and at this point I do not believe L5-S1 ALIF is the best approach for her  I believe decompression with interbody fusion at L4-5 and L5-S1 would be the most appropriate surgery to decompress those nerve roots  I made her aware that she has significant disease at L3-4 as well and that fusion of L4-S1 would create more stress at L3-4 and caused that level to break down faster  With the already significant degeneration at that level, I believe she may require further surgery in the future to address the L3-4 level  As such I would include the L3-4 in the fusion construct as well  I believe L3-S1 fusion with interbodies at L4-5 and L5-S1 is the most appropriate surgery for the patient    However the patient is an active smoker and I advised the patient that I would not perform such a big fusion operation while she is a smoker  I advised her about tobacco cessation on every visit that we have had  We also discussed the patient obtaining a 2nd opinion to see what other physicians view about her condition  I will be more than happy to treat the patient if she quits smoking  I will see her on a p r n  basis  History, physical examination and diagnostic tests were reviewed and questions answered  Diagnosis, care plan and treatment options were discussed  The patient and spouse/SO understand instructions and will follow up as directed  Follow-up:  P r n  Diagnoses and all orders for this visit:    Lumbar radiculopathy    Chronic right-sided low back pain with right-sided sciatica    Foraminal stenosis of lumbar region    Degenerative scoliosis in adult patient    Cigarette nicotine dependence with nicotine-induced disorder        Subjective/Objective     Chief Complaint    Right-sided low back and lower extremity pain and numbness  HPI    This is a 15-year-old female well known to our service presenting for follow-up visit for her chronic right-sided low back and lower extremity pain  The patient was initially scheduled for surgery with me in mid October  We had a virtual visit to answer some of her questions and during the visit, the patient reported newer symptoms  As such I brought her in for follow-up visit to further discuss  In brief, the patient has had chronic right-sided low back and lower extremity pain  She states the pain radiates in the right upper buttock down the posterior aspect of her thigh to the back of her knee  She reports having chronic numbness on the lateral aspect of her thigh which has been there for many years  The pain is her biggest issue which radiates down her leg and sometimes to the foot  The pain is on when she is upright and ambulating for any given period of time    Fairly recently she has noted numbness that goes in the anterior and somewhat medial aspect of her thigh down her knee to the anterior aspect of her leg  It is associated with tingling as well and sometimes she feels like there is cold water on her leg  She denies any left-sided symptoms  ROS  ROS personally reviewed and updated  Review of Systems   Constitutional: Negative  HENT: Negative  Eyes: Negative  Respiratory:        Chronic brontchitis   Cardiovascular: Negative  Gastrointestinal: Positive for constipation  Pain at times when moving bowels   Endocrine: Negative  Genitourinary: Negative  Musculoskeletal: Positive for back pain (LBP// now radiating to hip//, into right leg shooting down back to knee// inner right thigh), gait problem (prolonged standing is difficult with pain) and myalgias (muscular pain in right rib section)  Discussed foraminotomies at L4-5 and L5-S1 to treat the right leg pain versus L3-S1 fusion with interbodies at L4-5 and L5-S1  Rhizotomy 1/19/2022  12/3/2021 last injection  PT  PM through comprehensive pain   Allergic/Immunologic: Negative  Neurological: Positive for weakness (right leg) and numbness (inner right/groin, new whole leg right into foot)  Psychiatric/Behavioral: Negative          Family History    Family History   Problem Relation Age of Onset    Arthritis Mother     Coronary artery disease Mother         MI in her 63's    Parkinsonism Father         with falls and SDH    Parkinsonism Paternal Grandmother     Coronary artery disease Paternal Grandfather     Parkinsonism Paternal [de-identified]     Colon cancer Family     Depression Neg Hx        Social History    Social History     Socioeconomic History    Marital status: /Civil Union     Spouse name: Not on file    Number of children: Not on file    Years of education: Not on file    Highest education level: Not on file   Occupational History    Occupation: disability   Tobacco Use    Smoking status: Current Every Day Smoker     Packs/day: 0 50     Years: 35 00     Pack years: 17 50     Types: Cigarettes    Smokeless tobacco: Never Used   Vaping Use    Vaping Use: Never used   Substance and Sexual Activity    Alcohol use: Yes     Comment: "occasional glass of wine"    Drug use: Not Currently     Types: Marijuana, Cocaine     Comment: medical    Sexual activity: Yes     Partners: Male     Birth control/protection: Post-menopausal     Comment: PT is    Other Topics Concern    Not on file   Social History Narrative    Lives with spouse     Social Determinants of Health     Financial Resource Strain: Not on file   Food Insecurity: Not on file   Transportation Needs: Not on file   Physical Activity: Not on file   Stress: Not on file   Social Connections: Not on file   Intimate Partner Violence: Not on file   Housing Stability: Not on file       Past Medical History    Past Medical History:   Diagnosis Date    Chronic pain disorder     Depression     GERD (gastroesophageal reflux disease)     History of electroconvulsive therapy     Low back pain     Self-injurious behavior     Suicide attempt ([facility] [address] )        Surgical History    Past Surgical History:   Procedure Laterality Date     SECTION      COLONOSCOPY      PANCREAS SURGERY      "pseudocysts" per patient's  Ricki Olson    [place] ESOPHAGOGASTRODUODENOSCOPY TRANSORAL DIAGNOSTIC N/A 4/10/2018    Procedure: EGD AND COLONOSCOPY;  Surgeon: Jaret Dwyer MD;  [facility];   Service: Gastroenterology       Medications      Current Outpatient Medications:     diclofenac (VOLTAREN) 75 mg EC tablet, Take 75 mg by mouth 2 (two) times a day (Patient not taking: No sig reported), Disp: , Rfl:     gabapentin (NEURONTIN) 300 mg capsule, Take 300 mg by mouth 4 (four) times a day, Disp: , Rfl:     methocarbamol (ROBAXIN) 500 mg tablet, Take 1 tablet (500 mg total) by mouth 2 (two) times a day as needed for muscle spasms, Disp: 20 tablet, Rfl: 0    nicotine (NICODERM CQ) 21 mg/24 hr TD 24 hr patch, Place 1 patch on the skin every 24 hours, Disp: 28 patch, Rfl: 1    Nucynta 50 MG tablet, Take 50 mg by mouth 3 (three) times a day as needed (Patient not taking: Reported on 9/7/2022), Disp: , Rfl:     omeprazole (PriLOSEC) 40 MG capsule, TAKE ONE CAPSULE BY MOUTH DAILY in the morning 30 moniutes prior to eating as needed  , Disp: 30 capsule, Rfl: 0    QUEtiapine (SEROquel) 50 mg tablet, Take half or whole table by mouth daily at bedtime (Patient taking differently: Take whole table by mouth daily at bedtime), Disp: 30 tablet, Rfl: 3    venlafaxine (EFFEXOR-XR) 150 mg 24 hr capsule, Take 1 capsule (150 mg total) by mouth in the morning , Disp: 90 capsule, Rfl: 1    venlafaxine (EFFEXOR-XR) 37 5 mg 24 hr capsule, TAKE ONE CAPSULE BY MOUTH EVERY MORNING, Disp: 90 capsule, Rfl: 0    Allergies    Allergies   Allergen Reactions    Chantix [Varenicline]     Ibuprofen Other (See Comments)     Upset stomach    Lyrica [Pregabalin] Other (See Comments)     bruising    Penicillins Other (See Comments)     ? hives    Sulfa Antibiotics Other (See Comments)     sloughing skin in mouth    Sulfasalazine        The following portions of the patient's history were reviewed and updated as appropriate: allergies, current medications, past family history, past medical history, past social history, past surgical history and problem list     Investigations    I personally reviewed the MRI results with the patient:      Physical Exam    Vitals:  Blood pressure 90/52, pulse 72, temperature 99 8 °F (37 7 °C), temperature source Tympanic, resp  rate 16, height 5' 6" (1 676 m), weight 58 5 kg (129 lb), last menstrual period 03/14/2019 ,Body mass index is 20 82 kg/m²      General:  Normal, well developed, not in distress/pain     Skin:   No issues, no rashes noted     Musculoskeletal:   5/5 strength throughout all muscle groups  No tenderness to palpation of the spine       Neurologic Exam:  Awake and alert  Oriented x3  Speech clear and fluent  ATKINSON   Sensation to light touch and pin prick intact throughout  No vaughn's  No clonus  2+ patellar reflexes     Gait:   normal gait, normal posture

## 2022-09-13 DIAGNOSIS — K21.00 ESOPHAGITIS, REFLUX: ICD-10-CM

## 2022-09-13 RX ORDER — OMEPRAZOLE 40 MG/1
CAPSULE, DELAYED RELEASE ORAL
Qty: 30 CAPSULE | Refills: 0 | Status: ON HOLD | OUTPATIENT
Start: 2022-09-13

## 2022-09-19 ENCOUNTER — APPOINTMENT (INPATIENT)
Dept: CT IMAGING | Facility: HOSPITAL | Age: 52
End: 2022-09-19

## 2022-09-19 ENCOUNTER — HOSPITAL ENCOUNTER (INPATIENT)
Facility: HOSPITAL | Age: 52
LOS: 49 days | End: 2022-11-07
Attending: EMERGENCY MEDICINE | Admitting: INTERNAL MEDICINE

## 2022-09-19 ENCOUNTER — APPOINTMENT (INPATIENT)
Dept: NON INVASIVE DIAGNOSTICS | Facility: HOSPITAL | Age: 52
End: 2022-09-19

## 2022-09-19 ENCOUNTER — APPOINTMENT (EMERGENCY)
Dept: CT IMAGING | Facility: HOSPITAL | Age: 52
End: 2022-09-19

## 2022-09-19 ENCOUNTER — APPOINTMENT (OUTPATIENT)
Dept: RADIOLOGY | Facility: HOSPITAL | Age: 52
End: 2022-09-19

## 2022-09-19 DIAGNOSIS — R93.0 ABNORMAL MRI OF HEAD: ICD-10-CM

## 2022-09-19 DIAGNOSIS — N17.9 AKI (ACUTE KIDNEY INJURY) (HCC): ICD-10-CM

## 2022-09-19 DIAGNOSIS — R79.89 POSITIVE D DIMER: ICD-10-CM

## 2022-09-19 DIAGNOSIS — E46 MALNUTRITION (HCC): ICD-10-CM

## 2022-09-19 DIAGNOSIS — F33.2 SEVERE EPISODE OF RECURRENT MAJOR DEPRESSIVE DISORDER, WITHOUT PSYCHOTIC FEATURES (HCC): ICD-10-CM

## 2022-09-19 DIAGNOSIS — N17.8 ACUTE RENAL FAILURE WITH OTHER SPECIFIED PATHOLOGICAL LESION IN KIDNEY (HCC): ICD-10-CM

## 2022-09-19 DIAGNOSIS — R78.81 BACTEREMIA: ICD-10-CM

## 2022-09-19 DIAGNOSIS — R60.0 LEG EDEMA, RIGHT: ICD-10-CM

## 2022-09-19 DIAGNOSIS — K58.0 IRRITABLE BOWEL SYNDROME WITH DIARRHEA: ICD-10-CM

## 2022-09-19 DIAGNOSIS — M51.37 DDD (DEGENERATIVE DISC DISEASE), LUMBOSACRAL: ICD-10-CM

## 2022-09-19 DIAGNOSIS — Z72.0 TOBACCO ABUSE: ICD-10-CM

## 2022-09-19 DIAGNOSIS — Z87.81 HISTORY OF RIB FRACTURE: ICD-10-CM

## 2022-09-19 DIAGNOSIS — R74.8 ELEVATED LIVER ENZYMES: ICD-10-CM

## 2022-09-19 DIAGNOSIS — I10 PRIMARY HYPERTENSION: ICD-10-CM

## 2022-09-19 DIAGNOSIS — R74.01 TRANSAMINITIS: ICD-10-CM

## 2022-09-19 DIAGNOSIS — F41.1 GENERALIZED ANXIETY DISORDER: ICD-10-CM

## 2022-09-19 DIAGNOSIS — E16.2 HYPOGLYCEMIA: ICD-10-CM

## 2022-09-19 DIAGNOSIS — G93.40 ENCEPHALOPATHY ACUTE: ICD-10-CM

## 2022-09-19 DIAGNOSIS — G92.8 TOXIC METABOLIC ENCEPHALOPATHY: ICD-10-CM

## 2022-09-19 DIAGNOSIS — N17.9 ARF (ACUTE RENAL FAILURE) (HCC): Primary | ICD-10-CM

## 2022-09-19 DIAGNOSIS — M79.89 SWELLING OF RIGHT FOOT: ICD-10-CM

## 2022-09-19 DIAGNOSIS — E87.1 HYPONATREMIA: ICD-10-CM

## 2022-09-19 DIAGNOSIS — M79.661 RIGHT CALF PAIN: ICD-10-CM

## 2022-09-19 DIAGNOSIS — R41.82 ALTERED MENTAL STATUS: ICD-10-CM

## 2022-09-19 PROBLEM — T79.6XXA TRAUMATIC RHABDOMYOLYSIS (HCC): Status: ACTIVE | Noted: 2022-09-19

## 2022-09-19 PROBLEM — R93.89 ABNORMAL CT OF THE CHEST: Status: ACTIVE | Noted: 2022-09-19

## 2022-09-19 LAB
2HR DELTA HS TROPONIN: -7 NG/L
4HR DELTA HS TROPONIN: 4 NG/L
ALBUMIN SERPL BCP-MCNC: 2.3 G/DL (ref 3.5–5)
ALP SERPL-CCNC: 98 U/L (ref 46–116)
ALT SERPL W P-5'-P-CCNC: 674 U/L (ref 12–78)
AMMONIA PLAS-SCNC: 33 UMOL/L (ref 11–35)
AMPHETAMINES SERPL QL SCN: NEGATIVE
ANION GAP SERPL CALCULATED.3IONS-SCNC: 14 MMOL/L (ref 4–13)
ANION GAP SERPL CALCULATED.3IONS-SCNC: 15 MMOL/L (ref 4–13)
ANION GAP SERPL CALCULATED.3IONS-SCNC: 9 MMOL/L (ref 4–13)
APAP SERPL-MCNC: <2 UG/ML (ref 10–20)
APTT PPP: 27 SECONDS (ref 23–37)
APTT PPP: 52 SECONDS (ref 23–37)
AST SERPL W P-5'-P-CCNC: 1683 U/L (ref 5–45)
ATRIAL RATE: 103 BPM
ATRIAL RATE: 104 BPM
BACTERIA UR QL AUTO: ABNORMAL /HPF
BARBITURATES UR QL: NEGATIVE
BASE EX.OXY STD BLDV CALC-SCNC: 61.5 % (ref 60–80)
BASE EXCESS BLDV CALC-SCNC: -3.9 MMOL/L
BASOPHILS # BLD AUTO: 0.05 THOUSANDS/ÂΜL (ref 0–0.1)
BASOPHILS NFR BLD AUTO: 1 % (ref 0–1)
BENZODIAZ UR QL: NEGATIVE
BILIRUB SERPL-MCNC: 0.58 MG/DL (ref 0.2–1)
BILIRUB UR QL STRIP: ABNORMAL
BUN SERPL-MCNC: 53 MG/DL (ref 5–25)
BUN SERPL-MCNC: 53 MG/DL (ref 5–25)
BUN SERPL-MCNC: 54 MG/DL (ref 5–25)
CALCIUM ALBUM COR SERPL-MCNC: 9.2 MG/DL (ref 8.3–10.1)
CALCIUM SERPL-MCNC: 7.5 MG/DL (ref 8.3–10.1)
CALCIUM SERPL-MCNC: 7.7 MG/DL (ref 8.3–10.1)
CALCIUM SERPL-MCNC: 7.8 MG/DL (ref 8.3–10.1)
CARDIAC TROPONIN I PNL SERPL HS: 73 NG/L
CARDIAC TROPONIN I PNL SERPL HS: 80 NG/L
CARDIAC TROPONIN I PNL SERPL HS: 84 NG/L
CHLORIDE SERPL-SCNC: 87 MMOL/L (ref 96–108)
CHLORIDE SERPL-SCNC: 89 MMOL/L (ref 96–108)
CHLORIDE SERPL-SCNC: 93 MMOL/L (ref 96–108)
CK MB SERPL-MCNC: 1.1 % (ref 0–2.5)
CK MB SERPL-MCNC: 371.5 NG/ML (ref 0–5)
CK SERPL-CCNC: ABNORMAL U/L (ref 26–192)
CLARITY UR: ABNORMAL
CO2 SERPL-SCNC: 24 MMOL/L (ref 21–32)
CO2 SERPL-SCNC: 24 MMOL/L (ref 21–32)
CO2 SERPL-SCNC: 26 MMOL/L (ref 21–32)
COCAINE UR QL: NEGATIVE
COLOR UR: YELLOW
CREAT SERPL-MCNC: 5.82 MG/DL (ref 0.6–1.3)
CREAT SERPL-MCNC: 5.88 MG/DL (ref 0.6–1.3)
CREAT SERPL-MCNC: 6.03 MG/DL (ref 0.6–1.3)
D DIMER PPP FEU-MCNC: 3.84 UG/ML FEU
EOSINOPHIL # BLD AUTO: 0.01 THOUSAND/ÂΜL (ref 0–0.61)
EOSINOPHIL NFR BLD AUTO: 0 % (ref 0–6)
ERYTHROCYTE [DISTWIDTH] IN BLOOD BY AUTOMATED COUNT: 13.4 % (ref 11.6–15.1)
ETHANOL SERPL-MCNC: <3 MG/DL (ref 0–3)
EXT PREG TEST URINE: NEGATIVE
EXT. CONTROL ED NAV: NORMAL
FLUAV RNA RESP QL NAA+PROBE: NEGATIVE
FLUBV RNA RESP QL NAA+PROBE: NEGATIVE
GFR SERPL CREATININE-BSD FRML MDRD: 7 ML/MIN/1.73SQ M
GLUCOSE SERPL-MCNC: 56 MG/DL (ref 65–140)
GLUCOSE SERPL-MCNC: 59 MG/DL (ref 65–140)
GLUCOSE SERPL-MCNC: 88 MG/DL (ref 65–140)
GLUCOSE UR STRIP-MCNC: NEGATIVE MG/DL
HCO3 BLDV-SCNC: 23.3 MMOL/L (ref 24–30)
HCT VFR BLD AUTO: 38.4 % (ref 34.8–46.1)
HGB BLD-MCNC: 12.6 G/DL (ref 11.5–15.4)
HGB UR QL STRIP.AUTO: ABNORMAL
IMM GRANULOCYTES # BLD AUTO: 0.04 THOUSAND/UL (ref 0–0.2)
IMM GRANULOCYTES NFR BLD AUTO: 0 % (ref 0–2)
INR PPP: 1.11 (ref 0.84–1.19)
KETONES UR STRIP-MCNC: NEGATIVE MG/DL
LACTATE SERPL-SCNC: 0.4 MMOL/L (ref 0.5–2)
LEUKOCYTE ESTERASE UR QL STRIP: NEGATIVE
LYMPHOCYTES # BLD AUTO: 0.91 THOUSANDS/ÂΜL (ref 0.6–4.47)
LYMPHOCYTES NFR BLD AUTO: 10 % (ref 14–44)
MCH RBC QN AUTO: 33.1 PG (ref 26.8–34.3)
MCHC RBC AUTO-ENTMCNC: 32.8 G/DL (ref 31.4–37.4)
MCV RBC AUTO: 101 FL (ref 82–98)
METHADONE UR QL: NEGATIVE
MONOCYTES # BLD AUTO: 0.38 THOUSAND/ÂΜL (ref 0.17–1.22)
MONOCYTES NFR BLD AUTO: 4 % (ref 4–12)
NEUTROPHILS # BLD AUTO: 7.99 THOUSANDS/ÂΜL (ref 1.85–7.62)
NEUTS SEG NFR BLD AUTO: 85 % (ref 43–75)
NITRITE UR QL STRIP: NEGATIVE
NON-SQ EPI CELLS URNS QL MICRO: ABNORMAL /HPF
NRBC BLD AUTO-RTO: 0 /100 WBCS
O2 CT BLDV-SCNC: 11.4 ML/DL
OPIATES UR QL SCN: POSITIVE
OSMOLALITY UR: 208 MMOL/KG
OXYCODONE+OXYMORPHONE UR QL SCN: NEGATIVE
P AXIS: 69 DEGREES
P AXIS: 79 DEGREES
PCO2 BLDV: 51 MM HG (ref 42–50)
PCP UR QL: NEGATIVE
PH BLDV: 7.28 [PH] (ref 7.3–7.4)
PH UR STRIP.AUTO: 5.5 [PH]
PLATELET # BLD AUTO: 397 THOUSANDS/UL (ref 149–390)
PMV BLD AUTO: 10.4 FL (ref 8.9–12.7)
PO2 BLDV: 34.8 MM HG (ref 35–45)
POTASSIUM SERPL-SCNC: 4.7 MMOL/L (ref 3.5–5.3)
POTASSIUM SERPL-SCNC: 5 MMOL/L (ref 3.5–5.3)
POTASSIUM SERPL-SCNC: 5.4 MMOL/L (ref 3.5–5.3)
PR INTERVAL: 146 MS
PR INTERVAL: 146 MS
PROT SERPL-MCNC: 5.7 G/DL (ref 6.4–8.4)
PROT UR STRIP-MCNC: ABNORMAL MG/DL
PROTHROMBIN TIME: 14.4 SECONDS (ref 11.6–14.5)
QRS AXIS: 87 DEGREES
QRS AXIS: 93 DEGREES
QRSD INTERVAL: 80 MS
QRSD INTERVAL: 84 MS
QT INTERVAL: 334 MS
QT INTERVAL: 338 MS
QTC INTERVAL: 439 MS
QTC INTERVAL: 442 MS
RBC # BLD AUTO: 3.81 MILLION/UL (ref 3.81–5.12)
RBC #/AREA URNS AUTO: ABNORMAL /HPF
RSV RNA RESP QL NAA+PROBE: NEGATIVE
SALICYLATES SERPL-MCNC: 4.7 MG/DL (ref 3–20)
SARS-COV-2 RNA RESP QL NAA+PROBE: NEGATIVE
SODIUM 24H UR-SCNC: 22 MOL/L
SODIUM SERPL-SCNC: 126 MMOL/L (ref 135–147)
SODIUM SERPL-SCNC: 127 MMOL/L (ref 135–147)
SODIUM SERPL-SCNC: 128 MMOL/L (ref 135–147)
SP GR UR STRIP.AUTO: 1.02 (ref 1–1.03)
T WAVE AXIS: 54 DEGREES
T WAVE AXIS: 82 DEGREES
THC UR QL: NEGATIVE
TSH SERPL DL<=0.05 MIU/L-ACNC: 3.05 UIU/ML (ref 0.45–4.5)
UROBILINOGEN UR QL STRIP.AUTO: 0.2 E.U./DL
VENTRICULAR RATE: 103 BPM
VENTRICULAR RATE: 104 BPM
WBC # BLD AUTO: 9.38 THOUSAND/UL (ref 4.31–10.16)
WBC #/AREA URNS AUTO: ABNORMAL /HPF

## 2022-09-19 RX ORDER — HEPARIN SODIUM 1000 [USP'U]/ML
2400 INJECTION, SOLUTION INTRAVENOUS; SUBCUTANEOUS
Status: DISCONTINUED | OUTPATIENT
Start: 2022-09-19 | End: 2022-09-22

## 2022-09-19 RX ORDER — SODIUM CHLORIDE 9 MG/ML
125 INJECTION, SOLUTION INTRAVENOUS CONTINUOUS
Status: DISCONTINUED | OUTPATIENT
Start: 2022-09-19 | End: 2022-09-24

## 2022-09-19 RX ORDER — QUETIAPINE FUMARATE 25 MG/1
25 TABLET, FILM COATED ORAL
Status: DISCONTINUED | OUTPATIENT
Start: 2022-09-19 | End: 2022-09-29

## 2022-09-19 RX ORDER — DEXTROSE MONOHYDRATE 25 G/50ML
25 INJECTION, SOLUTION INTRAVENOUS ONCE
Status: COMPLETED | OUTPATIENT
Start: 2022-09-19 | End: 2022-09-19

## 2022-09-19 RX ORDER — VENLAFAXINE HYDROCHLORIDE 150 MG/1
150 CAPSULE, EXTENDED RELEASE ORAL DAILY
Status: DISCONTINUED | OUTPATIENT
Start: 2022-09-20 | End: 2022-09-29

## 2022-09-19 RX ORDER — ONDANSETRON 2 MG/ML
4 INJECTION INTRAMUSCULAR; INTRAVENOUS EVERY 4 HOURS PRN
Status: DISCONTINUED | OUTPATIENT
Start: 2022-09-19 | End: 2022-11-07 | Stop reason: HOSPADM

## 2022-09-19 RX ORDER — HEPARIN SODIUM 1000 [USP'U]/ML
4800 INJECTION, SOLUTION INTRAVENOUS; SUBCUTANEOUS ONCE
Status: COMPLETED | OUTPATIENT
Start: 2022-09-19 | End: 2022-09-19

## 2022-09-19 RX ORDER — NICOTINE 21 MG/24HR
1 PATCH, TRANSDERMAL 24 HOURS TRANSDERMAL DAILY
Status: DISCONTINUED | OUTPATIENT
Start: 2022-09-19 | End: 2022-11-07 | Stop reason: HOSPADM

## 2022-09-19 RX ORDER — HEPARIN SODIUM 1000 [USP'U]/ML
4800 INJECTION, SOLUTION INTRAVENOUS; SUBCUTANEOUS
Status: DISCONTINUED | OUTPATIENT
Start: 2022-09-19 | End: 2022-09-22

## 2022-09-19 RX ORDER — MORPHINE SULFATE 15 MG/1
15 TABLET ORAL 3 TIMES DAILY PRN
COMMUNITY
Start: 2022-09-02 | End: 2022-11-07

## 2022-09-19 RX ORDER — ACETAMINOPHEN 325 MG/1
650 TABLET ORAL EVERY 6 HOURS PRN
Status: DISCONTINUED | OUTPATIENT
Start: 2022-09-19 | End: 2022-11-07 | Stop reason: HOSPADM

## 2022-09-19 RX ORDER — HEPARIN SODIUM 10000 [USP'U]/100ML
3-30 INJECTION, SOLUTION INTRAVENOUS
Status: DISCONTINUED | OUTPATIENT
Start: 2022-09-19 | End: 2022-09-22

## 2022-09-19 RX ADMIN — HEPARIN SODIUM 2400 UNITS: 1000 INJECTION INTRAVENOUS; SUBCUTANEOUS at 17:56

## 2022-09-19 RX ADMIN — HEPARIN SODIUM 4800 UNITS: 1000 INJECTION INTRAVENOUS; SUBCUTANEOUS at 10:49

## 2022-09-19 RX ADMIN — SODIUM CHLORIDE 125 ML/HR: 0.9 INJECTION, SOLUTION INTRAVENOUS at 20:56

## 2022-09-19 RX ADMIN — SODIUM CHLORIDE 125 ML/HR: 0.9 INJECTION, SOLUTION INTRAVENOUS at 13:55

## 2022-09-19 RX ADMIN — QUETIAPINE FUMARATE 25 MG: 25 TABLET ORAL at 21:08

## 2022-09-19 RX ADMIN — DEXTROSE MONOHYDRATE 25 ML: 25 INJECTION, SOLUTION INTRAVENOUS at 10:45

## 2022-09-19 RX ADMIN — Medication 1 PATCH: at 17:23

## 2022-09-19 RX ADMIN — SODIUM CHLORIDE 1000 ML: 0.9 INJECTION, SOLUTION INTRAVENOUS at 10:17

## 2022-09-19 RX ADMIN — HEPARIN SODIUM 18 UNITS/KG/HR: 10000 INJECTION, SOLUTION INTRAVENOUS at 10:53

## 2022-09-19 NOTE — ASSESSMENT & PLAN NOTE
· Acute encephalopathy appears secondary to gabapentin and morphine with subsequent renal failure  · Also had hypoglycemia requiring D50  · Hold gabapentin morphine for now  Restart when appropriate

## 2022-09-19 NOTE — H&P
[address] [facility]  H&P- Rai Cory 1970, 45 y o  female MRN: [id number]  Unit/Bed#: [address] 217-01 Encounter: [id number]  Primary Care Provider: Calvin Adame MD   Date and time admitted to hospital: 9/19/2022  8:04 AM    Assessment and Plan  * Encephalopathy acute  Assessment & Plan  · Acute encephalopathy appears secondary to gabapentin and morphine with subsequent renal failure  · Also had hypoglycemia requiring D50  · Hold gabapentin morphine for now  Restart when appropriate  ARF (acute renal failure) ([facility] [address] )  Assessment & Plan  · Secondary to rhabdomyolysis  No evidence of renal obstruction on imaging  · Appreciate nephrology evaluation  Continue IV fluids  Results from last 7 days   Lab Units 09/19/22  1222 09/19/22  0829   BUN mg/dL 53* 53*   CREATININE mg/dL 5 82* 5 88*   EGFR ml/min/1 73sq m 7 7       Transaminitis  Assessment & Plan  · Secondary to rhabdomyolysis  No evidence of liver injury on CT imaging    Results from last 7 days   Lab Units 09/19/22  0829   AST U/L 1,683*   ALT U/L 674*   TOTAL BILIRUBIN mg/dL 0 58       Traumatic rhabdomyolysis [facility])  Assessment & Plan  · Secondary to fall on Friday  Does have a tense right lower extremity  · IV fluids per Nephrology  · Check imaging to rule out compartmental injury of right leg  · Check duplex to rule out DVT given elevated D-dimer  Continue empiric heparin infusion for now    Results from last 7 days   Lab Units 09/19/22  0829   CK TOTAL U/L 56,938*       Abnormal CT of the chest  Assessment & Plan  · Left-sided opacities noted  Cannot rule out infiltrate  · Check procalcitonin and COVID    GERD (gastroesophageal reflux disease)  Assessment & Plan  · Holding PPI given renal injury    DDD (degenerative disc disease), lumbosacral  Assessment & Plan  · Lumbar radiculopathy due for surgery  · Prior to admission was on gabapentin and morphine IR 15 mg   · Confirmed on     Hold both for now given kidney injury  Tobacco abuse  Assessment & Plan  · Nicotine patch to be ordered    Depression  Assessment & Plan  · Continue venlafaxine and quetiapine      VTE Prophylaxis: Heparin Drip  Code Status: Level 1 - Full Code  Anticipated Length of Stay:  Patient will be admitted on an Inpatient basis with an anticipated length of stay of  greater than 2 midnights  Justification for Hospital Stay: Encephalopathy acute  Total Time for Visit, including Counseling / Coordination of Care: xx mins  Greater than 50% of this total time spent on direct patient counseling and coordination of care  Chief Complaint:     Altered Mental Status (Pt arrives via ems from home  called ems because pt is is confused this morning  Pt is alert to self and place  But can not answer any other questions  Pt did have a fall 4 days ago  Pt denies drugs and alcohol use Pt denies cp and sob )    History of Present Illness:    Devin Zhang is a 45 y o  female with a past medical history of depression and lumbar radiculopathy who presents with change in mental status  Spouse and daughter at bedside help provide history  The patient due to have spine surgery next month  She has been under the care of pain management  Nucynta previously did not provide relief and was switched MSIR 15 mg the last three weeks  The patient was lethargic shortly after but then tolerated morphine  On Friday suffered a non witnessed fall likely falling out of bed  She has been extremely sedentary over the weekend and more lethargic with decreased oral intake  She had worsening right leg pain  EMS was summoned today to worsening encephalopathy in [facility] where she was found have elevated D-dimer and renal failure prompting admission  Upon further investigation she was also found to have rhabdomyolysis  Review of Systems:  Review of Systems   Constitutional: Negative for chills and fever  HENT: Negative for facial swelling      Eyes: Negative for pain  Respiratory: Negative for shortness of breath and wheezing  Cardiovascular: Negative for chest pain and palpitations  Gastrointestinal: Negative for abdominal distention, abdominal pain, diarrhea, nausea and vomiting  Genitourinary: Negative for hematuria and urgency  Musculoskeletal: Positive for arthralgias and myalgias  Negative for back pain  Right calf pain   Skin: Negative for rash  Neurological: Positive for weakness  Negative for seizures, speech difficulty and numbness  Psychiatric/Behavioral: Positive for confusion  All other systems reviewed and are negative  Past Medical and Surgical History:   Past Medical History:   Diagnosis Date   • Chronic pain disorder    • Depression    • GERD (gastroesophageal reflux disease)    • History of electroconvulsive therapy    • Low back pain    • Self-injurious behavior    • Suicide attempt [facility])      Past Surgical History:   Procedure Laterality Date   •  SECTION     • COLONOSCOPY     • PANCREAS SURGERY      "pseudocysts" per patient's  Ricki Infante   • AR ESOPHAGOGASTRODUODENOSCOPY TRANSORAL DIAGNOSTIC N/A 4/10/2018    Procedure: EGD AND COLONOSCOPY;  Surgeon: Asia Paige MD;  [place] [facility]  GI LAB; Service: Gastroenterology     Meds/Allergies: Allergies: Allergies   Allergen Reactions   • Chantix [Varenicline]    • Ibuprofen Other (See Comments)     Upset stomach   • Lyrica [Pregabalin] Other (See Comments)     bruising   • Penicillins Other (See Comments)     ? hives   • Sulfa Antibiotics Other (See Comments)     sloughing skin in mouth   • Sulfasalazine      Prior to Admission Medications   Prescriptions Last Dose Informant Patient Reported? Taking?    QUEtiapine (SEROquel) 50 mg tablet 2022 at Unknown time  No Yes   Sig: Take half or whole table by mouth daily at bedtime   Patient taking differently: Take whole table by mouth daily at bedtime   gabapentin (NEURONTIN) 300 mg capsule 2022 at Unknown time Self Yes Yes   Sig: Take 300 mg by mouth 4 (four) times a day   methocarbamol (ROBAXIN) 500 mg tablet 9/18/2022 at Unknown time Self No Yes   Sig: Take 1 tablet (500 mg total) by mouth 2 (two) times a day as needed for muscle spasms   morphine (MSIR) 15 mg tablet   Yes No   Sig: Take 15 mg by mouth 3 (three) times a day as needed   nicotine (NICODERM CQ) 21 mg/24 hr TD 24 hr patch   No No   Sig: Place 1 patch on the skin every 24 hours   Patient not taking: Reported on 9/12/2022   omeprazole (PriLOSEC) 40 MG capsule 9/18/2022 at Unknown time  No Yes   Sig: TAKE ONE CAPSULE BY MOUTH EVERY DAY IN THE MORNING 30 MINUTES PRIOR TO EATING AS NEEDED   venlafaxine (EFFEXOR-XR) 150 mg 24 hr capsule 9/18/2022 at Unknown time Self No Yes   Sig: Take 1 capsule (150 mg total) by mouth in the morning     venlafaxine (EFFEXOR-XR) 37 5 mg 24 hr capsule 9/18/2022 at Unknown time  No Yes   Sig: TAKE ONE CAPSULE BY MOUTH EVERY MORNING      Facility-Administered Medications: None     Social History:     Social History     Socioeconomic History   • Marital status: /Civil Union     Spouse name: Not on file   • Number of children: Not on file   • Years of education: Not on file   • Highest education level: Not on file   Occupational History   • Occupation: disability   Tobacco Use   • Smoking status: Current Every Day Smoker     Packs/day: 0 50     Years: 35 00     Pack years: 17 50     Types: Cigarettes   • Smokeless tobacco: Never Used   Vaping Use   • Vaping Use: Never used   Substance and Sexual Activity   • Alcohol use: Yes     Comment: "occasional glass of wine"   • Drug use: Not Currently     Types: Marijuana, Cocaine     Comment: medical   • Sexual activity: Yes     Partners: Male     Birth control/protection: Post-menopausal     Comment: PT is    Other Topics Concern   • Not on file   Social History Narrative    Lives with spouse     Social Determinants of Health     Financial Resource Strain: Not on file   Food Insecurity: Not on file   Transportation Needs: Not on file   Physical Activity: Not on file   Stress: Not on file   Social Connections: Not on file   Intimate Partner Violence: Not on file   Housing Stability: Not on file     Patient Pre-hospital Living Situation:  Lives with spouse  Patient Pre-hospital Level of Mobility:   Patient Pre-hospital Diet Restrictions:     Family History:  Family History   Problem Relation Age of Onset   • Arthritis Mother    • Coronary artery disease Mother         MI in her 63's   • Parkinsonism Father         with falls and SDH   • Parkinsonism Paternal Grandmother    • Coronary artery disease Paternal Grandfather    • Parkinsonism Paternal Aunt    • Colon cancer Family    • Depression Neg Hx      Physical Exam:   Vitals:   Blood Pressure: 159/88 (09/19/22 1308)  Pulse: 100 (09/19/22 1308)  Temperature: 97 8 °F (36 6 °C) (09/19/22 1308)  Temp Source: Oral (09/19/22 1308)  Respirations: 16 (09/19/22 1308)  Height: 5' 6" (167 6 cm) (09/19/22 1308)  Weight - Scale: 62 6 kg (138 lb 0 1 oz) (09/19/22 1308)  SpO2: 93 % (09/19/22 1304)    Physical Exam  Vitals reviewed  Constitutional:       General: She is not in acute distress  Appearance: Normal appearance  HENT:      Head: Atraumatic  Mouth/Throat:      Mouth: Mucous membranes are dry  Eyes:      General: No scleral icterus  Extraocular Movements: Extraocular movements intact  Pupils: Pupils are equal, round, and reactive to light  Cardiovascular:      Rate and Rhythm: Regular rhythm  Pulmonary:      Breath sounds: Normal breath sounds  No wheezing  Abdominal:      General: Bowel sounds are normal       Palpations: Abdomen is soft  Tenderness: There is no guarding or rebound  Musculoskeletal:         General: Swelling and tenderness present  Cervical back: Normal range of motion  Comments: Right calf   Skin:     General: Skin is warm        Comments: Abrasions chin and right knee Neurological:      Mental Status: She is alert  She is disoriented  Psychiatric:         Mood and Affect: Mood normal        Lab Results: I have personally reviewed pertinent reports      Results from last 7 days   Lab Units 09/19/22  0829   WBC Thousand/uL 9 38   HEMOGLOBIN g/dL 12 6   HEMATOCRIT % 38 4   PLATELETS Thousands/uL 397*   NEUTROS PCT % 85*   LYMPHS PCT % 10*   MONOS PCT % 4   EOS PCT % 0     Results from last 7 days   Lab Units 09/19/22  1222 09/19/22  0829   SODIUM mmol/L 127* 126*   POTASSIUM mmol/L 4 7 5 4*   CHLORIDE mmol/L 89* 87*   CO2 mmol/L 24 24   ANION GAP mmol/L 14* 15*   BUN mg/dL 53* 53*   CREATININE mg/dL 5 82* 5 88*   CALCIUM mg/dL 7 5* 7 8*   ALBUMIN g/dL  --  2 3*   TOTAL BILIRUBIN mg/dL  --  0 58   ALK PHOS U/L  --  98   ALT U/L  --  674*   AST U/L  --  1,683*   EGFR ml/min/1 73sq m 7 7   GLUCOSE RANDOM mg/dL 88 56*     Results from last 7 days   Lab Units 09/19/22  1047   INR  1 11     Results from last 7 days   Lab Units 09/19/22  0829   CK TOTAL U/L 32,634*   CK MB INDEX % 1 1     Results from last 7 days   Lab Units 09/19/22  1220 09/19/22  1047 09/19/22  0829   HS TNI 0HR ng/L  --   --  80*   HS TNI 2HR ng/L  --  73*  --    HS TNI 4HR ng/L 84*  --   --      Results from last 7 days   Lab Units 09/19/22  1222   LACTIC ACID mmol/L 0 4*     Results from last 7 days   Lab Units 09/19/22  0829   D-DIMER QUANTITATIVE ug/ml FEU 3 84*          Results from last 7 days   Lab Units 09/19/22  1223   COLOR UA  Yellow   CLARITY UA  Slightly Cloudy   SPEC GRAV UA  1 020   PH UA  5 5   LEUKOCYTES UA  Negative   NITRITE UA  Negative   GLUCOSE UA mg/dl Negative   KETONES UA mg/dl Negative   BILIRUBIN UA  Small*   BLOOD UA  Large*      Results from last 7 days   Lab Units 09/19/22  1223   RBC UA /hpf 0-1*   WBC UA /hpf None Seen   EPITHELIAL CELLS WET PREP /hpf Occasional   BACTERIA UA /hpf None Seen            Imaging: I have personally reviewed pertinent films in PACS  CT chest abdomen pelvis wo contrast    Result Date: 9/19/2022  Impression: Bilateral groundglass opacities with superimposed inter and intralobular septal thickening  Differential diagnosis includes pulmonary hemorrhage, infection, and pulmonary edema, although the distribution is atypical for edema  The study was marked in [facility] for immediate notification  Workstation performed: [id number]     XR hip/pelv 2-3 vws right    Result Date: 9/19/2022  Impression: No acute osseous abnormality  Workstation performed: [id number]     XR ankle 3+ views RIGHT    Result Date: 9/19/2022  Impression: No acute osseous abnormality  Workstation performed: [id number]     XR foot 3+ views RIGHT    Result Date: 9/19/2022  Impression: No acute osseous abnormality  Workstation performed: [id number]     CT head without contrast    Result Date: 9/19/2022  Impression: No acute intracranial process  No skull fracture  Workstation performed: [id number]     CT spine cervical without contrast    Result Date: 9/19/2022  Impression: No cervical spine fracture or traumatic malalignment  Incidental finding of partially visualized subpleural groundglass opacity in the left pulmonary apex presumably scarring  Cannot exclude infiltrate  This study demonstrates a significant  finding and was documented as such in Epic for liaison and referring practitioner notification  Workstation performed: [id number]     XR chest 1 view    Result Date: 9/19/2022  Impression: No acute cardiopulmonary disease  Findings are stable Workstation performed: [id number]       EKG, Pathology, and Other Studies Reviewed on Admission:   EKG  Result Date: 09/19/22  Personally reviewed strips with impression of:  Sinus tachycardia one increased bpm    Allscripts/ Epic Records Reviewed: Yes    ** Please Note: This note has been constructed using a voice recognition system   **

## 2022-09-19 NOTE — ASSESSMENT & PLAN NOTE
· Lumbar radiculopathy due for surgery  · Prior to admission was on gabapentin and morphine IR 15 mg   · Confirmed on   Hold both for now given kidney injury

## 2022-09-19 NOTE — ED PROVIDER NOTES
History  Chief Complaint   Patient presents with   • Altered Mental Status     Pt arrives via ems from home  called ems because pt is is confused this morning  Pt is alert to self and place  But can not answer any other questions  Pt did have a fall 4 days ago  Pt denies drugs and alcohol use Pt denies cp and sob      69-year-old female with a past medical history of IBS, hypertension, hyperlipidemia, depression, COPD, anxiety, GERD, and opioid abuse presents with altered mental status   called EMS because he found patient confused this morning  Apparently, she had a fall and hit her head 4 days ago  Patient had been acting normally afterwards and was not evaluated  Patient is oriented to person and place, but cannot answer any other questions  She will answer yes and no to some questions  Patient denies chest pain and shortness of breath  She states that she is having pain in her right ankle, which is swollen  She states that this swelling is new  Patient denies taking any recreational drugs  She denies taking more than her prescribed doses of her home medications  Prior to Admission Medications   Prescriptions Last Dose Informant Patient Reported? Taking?    Nucynta 50 MG tablet  Self Yes No   Sig: Take 50 mg by mouth 3 (three) times a day as needed   Patient not taking: Reported on 9/7/2022   QUEtiapine (SEROquel) 50 mg tablet 9/18/2022 at Unknown time  No Yes   Sig: Take half or whole table by mouth daily at bedtime   Patient taking differently: Take whole table by mouth daily at bedtime   diclofenac (VOLTAREN) 75 mg EC tablet   Yes No   Sig: Take 75 mg by mouth 2 (two) times a day   Patient not taking: No sig reported   gabapentin (NEURONTIN) 300 mg capsule 9/18/2022 at Unknown time Self Yes Yes   Sig: Take 300 mg by mouth 4 (four) times a day   methocarbamol (ROBAXIN) 500 mg tablet 9/18/2022 at Unknown time Self No Yes   Sig: Take 1 tablet (500 mg total) by mouth 2 (two) times a day as needed for muscle spasms   nicotine (NICODERM CQ) 21 mg/24 hr TD 24 hr patch   No No   Sig: Place 1 patch on the skin every 24 hours   Patient not taking: Reported on 2022   omeprazole (PriLOSEC) 40 MG capsule 2022 at Unknown time  No Yes   Sig: TAKE ONE CAPSULE BY MOUTH EVERY DAY IN THE MORNING 30 MINUTES PRIOR TO EATING AS NEEDED   venlafaxine (EFFEXOR-XR) 150 mg 24 hr capsule 2022 at Unknown time Self No Yes   Sig: Take 1 capsule (150 mg total) by mouth in the morning  venlafaxine (EFFEXOR-XR) 37 5 mg 24 hr capsule 2022 at Unknown time  No Yes   Sig: TAKE ONE CAPSULE BY MOUTH EVERY MORNING      Facility-Administered Medications: None       Past Medical History:   Diagnosis Date   • Chronic pain disorder    • Depression    • GERD (gastroesophageal reflux disease)    • History of electroconvulsive therapy    • Low back pain    • Self-injurious behavior    • Suicide attempt [facility])        Past Surgical History:   Procedure Laterality Date   •  SECTION     • COLONOSCOPY     • PANCREAS SURGERY      "pseudocysts" per patient's  Ricki Infante   • CA ESOPHAGOGASTRODUODENOSCOPY TRANSORAL DIAGNOSTIC N/A 4/10/2018    Procedure: EGD AND COLONOSCOPY;  Surgeon: Jatinder Christy MD;  [place] [facility]; Service: Gastroenterology       Family History   Problem Relation Age of Onset   • Arthritis Mother    • Coronary artery disease Mother         MI in her 63's   • Parkinsonism Father         with falls and SDH   • Parkinsonism Paternal Grandmother    • Coronary artery disease Paternal Grandfather    • Parkinsonism Paternal Aunt    • Colon cancer Family    • Depression Neg Hx      I have reviewed and agree with the history as documented      E-Cigarette/Vaping   • E-Cigarette Use Never User      E-Cigarette/Vaping Substances   • Nicotine No    • THC No    • CBD No    • Flavoring No    • Other No    • Unknown No      Social History     Tobacco Use   • Smoking status: Current Every Day Smoker Packs/day: 0 50     Years: 35 00     Pack years: 17 50     Types: Cigarettes   • Smokeless tobacco: Never Used   Vaping Use   • Vaping Use: Never used   Substance Use Topics   • Alcohol use: Yes     Comment: "occasional glass of wine"   • Drug use: Not Currently     Types: Marijuana, Cocaine     Comment: medical        Review of Systems   Unable to perform ROS: Mental status change       Physical Exam  ED Triage Vitals   Temperature Pulse Respirations Blood Pressure SpO2   09/19/22 0810 09/19/22 0810 09/19/22 0810 09/19/22 0810 09/19/22 0810   98 5 °F (36 9 °C) (!) 109 18 155/72 96 %      Temp Source Heart Rate Source Patient Position - Orthostatic VS BP Location FiO2 (%)   09/19/22 0810 09/19/22 1051 09/19/22 0810 09/19/22 0810 --   Oral Monitor Sitting Right arm       Pain Score       09/19/22 1308       No Pain             Orthostatic Vital Signs  Vitals:    09/19/22 0810 09/19/22 1051 09/19/22 1236 09/19/22 1304   BP: 155/72 143/82 153/79 159/88   Pulse: (!) 109 102 102 100   Patient Position - Orthostatic VS: Sitting Lying Lying        Physical Exam  Vitals and nursing note reviewed  Constitutional:       General: She is not in acute distress  Appearance: Normal appearance  She is well-developed  She is not ill-appearing  HENT:      Head: Normocephalic and atraumatic  Mouth/Throat:      Mouth: Mucous membranes are moist    Eyes:      General: No scleral icterus  Extraocular Movements: Extraocular movements intact  Conjunctiva/sclera: Conjunctivae normal       Pupils: Pupils are equal, round, and reactive to light  Pupils are equal       Comments: Pupils 3 mm   Cardiovascular:      Rate and Rhythm: Normal rate and regular rhythm  Heart sounds: No murmur heard  Pulmonary:      Effort: Pulmonary effort is normal  No respiratory distress  Breath sounds: Normal breath sounds  No wheezing, rhonchi or rales  Abdominal:      General: Abdomen is flat  There is no distension  Palpations: Abdomen is soft  Tenderness: There is no abdominal tenderness  There is no guarding  Musculoskeletal:      Cervical back: Normal range of motion and neck supple  Comments: Patient will not lift right leg off of the bed  She seems to have pain with range of motion of the right hip  No pain to palpation of either hip  Patient has no pain to palpation of either calf  She has pain to palpation of the right foot and ankle  There is significant swelling of the right foot and ankle  No calf swelling  Skin:     General: Skin is warm and dry  Comments: Old abrasions to the chin and right knee   Neurological:      General: No focal deficit present  Mental Status: She is alert  She is confused  Cranial Nerves: No cranial nerve deficit or facial asymmetry  Motor: No weakness        Comments: Oriented x2  No asterixis         ED Medications  Medications   heparin (porcine) 25,000 units in 0 45% NaCl 250 mL infusion (premix) (18 Units/kg/hr × 60 kg (Order-Specific) Intravenous New Bag 9/19/22 1053)   heparin (porcine) injection 4,800 Units (has no administration in time range)   heparin (porcine) injection 2,400 Units (has no administration in time range)   sodium chloride 0 9 % infusion (has no administration in time range)   sodium chloride 0 9 % bolus 1,000 mL (0 mL Intravenous Stopped 9/19/22 1234)   dextrose 50 % IV solution 25 mL (25 mL Intravenous Given 9/19/22 1045)   heparin (porcine) injection 4,800 Units (4,800 Units Intravenous Given 9/19/22 1049)       Diagnostic Studies  Results Reviewed     Procedure Component Value Units Date/Time    Rapid drug screen, urine [[id number]]  (Abnormal) Collected: 09/19/22 1222    Lab Status: Final result Specimen: Urine, Catheter Updated: 09/19/22 1301     Amph/Meth UR Negative     Barbiturate Ur Negative     Benzodiazepine Urine Negative     Cocaine Urine Negative     Methadone Urine Negative     Opiate Urine Positive     PCP Ur Negative     THC Urine Negative     Oxycodone Urine Negative    Narrative:      Presumptive report  If requested, specimen will be sent to reference lab for confirmation  FOR MEDICAL PURPOSES ONLY  IF CONFIRMATION NEEDED PLEASE CONTACT THE LAB WITHIN 5 DAYS  Drug Screen Cutoff Levels:  AMPHETAMINE/METHAMPHETAMINES  1000 ng/mL  BARBITURATES     200 ng/mL  BENZODIAZEPINES     200 ng/mL  COCAINE      300 ng/mL  METHADONE      300 ng/mL  OPIATES      300 ng/mL  PHENCYCLIDINE     25 ng/mL  THC       50 ng/mL  OXYCODONE      100 ng/mL    HS Troponin I 4hr [[id number]]  (Abnormal) Collected: 09/19/22 1220    Lab Status: Final result Specimen: Blood from Arm, Right Updated: 09/19/22 1300     hs TnI 4hr 84 ng/L      Delta 4hr hsTnI 4 ng/L     UA w Reflex to Microscopic w Reflex to Culture [[id number]]  (Abnormal) Collected: 09/19/22 1223    Lab Status: Final result Specimen: Urine, Straight Cath Updated: 09/19/22 1256     Color, UA Yellow     Clarity, UA Slightly Cloudy     Specific [place], UA 1 020     pH, UA 5 5     Leukocytes, UA Negative     Nitrite, UA Negative     Protein, UA Trace mg/dl      Glucose, UA Negative mg/dl      Ketones, UA Negative mg/dl      Urobilinogen, UA 0 2 E U /dl      Bilirubin, UA Small     Occult Blood, UA Large    Urine Microscopic [[id number]] Collected: 09/19/22 1223    Lab Status:  In process Specimen: Urine, Straight Cath Updated: 09/19/22 8151    Basic metabolic panel [[id number]]  (Abnormal) Collected: 09/19/22 1222    Lab Status: Final result Specimen: Blood from Arm, Right Updated: 09/19/22 1251     Sodium 127 mmol/L      Potassium 4 7 mmol/L      Chloride 89 mmol/L      CO2 24 mmol/L      ANION GAP 14 mmol/L      BUN 53 mg/dL      Creatinine 5 82 mg/dL      Glucose 88 mg/dL      Calcium 7 5 mg/dL      eGFR 7 ml/min/1 73sq m     Narrative:      [place] guidelines for Chronic Kidney Disease (CKD):   •  Stage 1 with normal or high GFR (GFR > 90 mL/min/1 73 square meters)  •  Stage 2 Mild CKD (GFR = 60-89 mL/min/1 73 square meters)  •  Stage 3A Moderate CKD (GFR = 45-59 mL/min/1 73 square meters)  •  Stage 3B Moderate CKD (GFR = 30-44 mL/min/1 73 square meters)  •  Stage 4 Severe CKD (GFR = 15-29 mL/min/1 73 square meters)  •  Stage 5 End Stage CKD (GFR <15 mL/min/1 73 square meters)  Note: GFR calculation is accurate only with a steady state creatinine    Sodium, urine, random [[id number]] Collected: 09/19/22 1222    Lab Status: In process Specimen: Urine, Catheter Updated: 09/19/22 1243    Osmolality, urine [[id number]] Collected: 09/19/22 1222    Lab Status: In process Specimen: Urine, Catheter Updated: 09/19/22 1243    Blood gas, venous [[id number]]  (Abnormal) Collected: 09/19/22 1220    Lab Status: Final result Specimen: Blood from Arm, Right Updated: 09/19/22 1236     pH, Kiko 7 278     pCO2, Kiko 51 0 mm Hg      pO2, Kiko 34 8 mm Hg      HCO3, Kiko 23 3 mmol/L      Base Excess, Kiko -3 9 mmol/L      O2 Content, Kiko 11 4 ml/dL      O2 HGB, VENOUS 61 5 %     Lactic acid, plasma [[id number]] Collected: 09/19/22 1222    Lab Status:  In process Specimen: Blood from Arm, Right Updated: 09/19/22 1233    CKMB [[id number]]  (Abnormal) Collected: 09/19/22 0829    Lab Status: Final result Specimen: Blood from Hand, Left Updated: 09/19/22 1232     CK-MB Index 1 1 %      CK- 5 ng/mL     POCT pregnancy, urine [[id number]]  (Normal) Resulted: 09/19/22 1231    Lab Status: Final result Updated: 09/19/22 1231     EXT PREG TEST UR (Ref: Negative) negative     Control valid    Basic metabolic panel [[id number]]     Lab Status: No result Specimen: Blood     CK [[id number]]  (Abnormal) Collected: 09/19/22 0829    Lab Status: Final result Specimen: Blood from Hand, Left Updated: 09/19/22 1201     Total CK 32,634 U/L     HS Troponin I 2hr [[id number]]  (Abnormal) Collected: 09/19/22 1047    Lab Status: Final result Specimen: Blood from Arm, Left Updated: 09/19/22 1134     hs TnI 2hr 73 ng/L Delta 2hr hsTnI -7 ng/L     APTT [[id number]]  (Normal) Collected: 09/19/22 1047    Lab Status: Final result Specimen: Blood from Arm, Left Updated: 09/19/22 1119     PTT 27 seconds     Protime-INR [[id number]]  (Normal) Collected: 09/19/22 1047    Lab Status: Final result Specimen: Blood from Arm, Left Updated: 09/19/22 1119     Protime 14 4 seconds      INR 1 11    Acetaminophen level-If concentration is detectable, please discuss with medical  on call   [[id number]]  (Abnormal) Collected: 09/19/22 0829    Lab Status: Final result Specimen: Blood from Hand, Left Updated: 09/19/22 0941     Acetaminophen Level <2 ug/mL     Comprehensive metabolic panel [[id number]]  (Abnormal) Collected: 09/19/22 0829    Lab Status: Final result Specimen: Blood from Hand, Left Updated: 09/19/22 0939     Sodium 126 mmol/L      Potassium 5 4 mmol/L      Chloride 87 mmol/L      CO2 24 mmol/L      ANION GAP 15 mmol/L      BUN 53 mg/dL      Creatinine 5 88 mg/dL      Glucose 56 mg/dL      Calcium 7 8 mg/dL      Corrected Calcium 9 2 mg/dL      AST 1,683 U/L       U/L      Alkaline Phosphatase 98 U/L      Total Protein 5 7 g/dL      Albumin 2 3 g/dL      Total Bilirubin 0 58 mg/dL      eGFR 7 ml/min/1 73sq m     Narrative:      [place] guidelines for Chronic Kidney Disease (CKD):   •  Stage 1 with normal or high GFR (GFR > 90 mL/min/1 73 square meters)  •  Stage 2 Mild CKD (GFR = 60-89 mL/min/1 73 square meters)  •  Stage 3A Moderate CKD (GFR = 45-59 mL/min/1 73 square meters)  •  Stage 3B Moderate CKD (GFR = 30-44 mL/min/1 73 square meters)  •  Stage 4 Severe CKD (GFR = 15-29 mL/min/1 73 square meters)  •  Stage 5 End Stage CKD (GFR <15 mL/min/1 73 square meters)  Note: GFR calculation is accurate only with a steady state creatinine    TSH [[id number]]  (Normal) Collected: 09/19/22 0829    Lab Status: Final result Specimen: Blood from Hand, Left Updated: 09/19/22 0939     TSH 3RD Hossein Cosme 3 047 uIU/mL     Narrative:      Patients undergoing fluorescein dye angiography may retain small amounts of fluorescein in the body for 48-72 hours post procedure  Samples containing fluorescein can produce falsely depressed TSH values  If the patient had this procedure,a specimen should be resubmitted post fluorescein clearance  Salicylate level [[id number]]  (Normal) Collected: 09/19/22 0829    Lab Status: Final result Specimen: Blood from Hand, Left Updated: 45/58/33 8285     Salicylate Lvl 4 7 mg/dL     Ethanol [[id number]]  (Normal) Collected: 09/19/22 0829    Lab Status: Final result Specimen: Blood from Hand, Left Updated: 09/19/22 0920     Ethanol Lvl <3 mg/dL     D-Dimer [[id number]]  (Abnormal) Collected: 09/19/22 0829    Lab Status: Final result Specimen: Blood from Arm, Left Updated: 09/19/22 0909     D-Dimer, Quant 3 84 ug/ml FEU     Narrative: In the evaluation for possible pulmonary embolism, in the appropriate (Well's Score of 4 or less) patient, the age adjusted d-dimer cutoff for this patient can be calculated as:    Age x 0 01 (in ug/mL) for Age-adjusted D-dimer exclusion threshold for a patient over 50 years      HS Troponin 0hr (reflex protocol) [[id number]]  (Abnormal) Collected: 09/19/22 0829    Lab Status: Final result Specimen: Blood from Hand, Left Updated: 09/19/22 0907     hs TnI 0hr 80 ng/L     Ammonia [[id number]]  (Normal) Collected: 09/19/22 0829    Lab Status: Final result Specimen: Blood from Hand, Left Updated: 09/19/22 0906     Ammonia 33 umol/L     CBC and differential [[id number]]  (Abnormal) Collected: 09/19/22 0829    Lab Status: Final result Specimen: Blood from Hand, Left Updated: 09/19/22 0843     WBC 9 38 Thousand/uL      RBC 3 81 Million/uL      Hemoglobin 12 6 g/dL      Hematocrit 38 4 %       fL      MCH 33 1 pg      MCHC 32 8 g/dL      RDW 13 4 %      MPV 10 4 fL      Platelets 419 Thousands/uL      nRBC 0 /100 WBCs      Neutrophils Relative 85 %      Immat GRANS % 0 %      Lymphocytes Relative 10 %      Monocytes Relative 4 %      Eosinophils Relative 0 %      Basophils Relative 1 %      Neutrophils Absolute 7 99 Thousands/µL      Immature Grans Absolute 0 04 Thousand/uL      Lymphocytes Absolute 0 91 Thousands/µL      Monocytes Absolute 0 38 Thousand/µL      Eosinophils Absolute 0 01 Thousand/µL      Basophils Absolute 0 05 Thousands/µL                  CT chest abdomen pelvis wo contrast   Final Result by Dave Box MD (09/19 1301)      Bilateral groundglass opacities with superimposed inter and intralobular septal thickening  Differential diagnosis includes pulmonary hemorrhage, infection, and pulmonary edema, although the distribution is atypical for edema  The study was marked in [facility] for immediate notification  Workstation performed: [id number]         XR ankle 3+ views RIGHT   Final Result by Genie Qiu MD (09/19 1009)      No acute osseous abnormality  Workstation performed: [id number]         XR foot 3+ views RIGHT   Final Result by Genie Qiu MD (09/19 1009)      No acute osseous abnormality  Workstation performed: [id number]         XR hip/pelv 2-3 vws right   Final Result by Genie Qiu MD (09/19 1007)      No acute osseous abnormality  Workstation performed: [id number]         XR chest 1 view   Final Result by Genie Qiu MD (09/19 1008)      No acute cardiopulmonary disease  Findings are stable            Workstation performed: [id number]         CT head without contrast   Final Result by Imelda Hernández MD (09/19 7658)      No acute intracranial process  No skull fracture  Workstation performed: [id number]         CT spine cervical without contrast   Final Result by Imelda Hernández MD (09/19 6612)      No cervical spine fracture or traumatic malalignment        Incidental finding of partially visualized subpleural groundglass opacity in the left pulmonary apex presumably scarring  Cannot exclude infiltrate  This study demonstrates a significant  finding and was documented as such in [facility] for liaison and referring practitioner notification  Workstation performed: [id number]               Procedures  Procedures      ED Course  ED Course as of 09/19/22 1313   Mon Sep 19, 2022   0853 ECG 12 lead  The heart rate is 104, which is tachycardic  The rhythm is regular  The axis is normal  The P waves are normal and the MT interval is normal  The QRS height is normal and width is normal  The ST segments are not elevated or depressed  The T waves are normal  The QT segment is normal  This ecg shows sinus tachycardia  5783 D-Dimer, Quant(!): 3 84  Cta added   0948 Sodium(!): 126  Normal saline added   0948 Creatinine(!): 5 88  Not baseline   0949 AST(!): 1,683  Not baseline   0949 ALT(!): 674  Not baseline   1013 D-Dimer, Quant(!): 3 84  Cannot PE scan due to GFR of 7  Heparin started   1013 Glucose, Random(!): 56  B17 added   7835 SALICYLATE LEVEL: 4 7   reports there may be asa in the back cream she uses   1013 Spoke to  at bedside  He believes patient fell on Friday  She was not altered at that time  She was lethargic yesterday, but became confused today  She is getting back surgery due to right leg pain, so this is chronic  However, he notes the R foot swelling is new  She has not been bed-bound since falling, but he notes that she only gets up to use the bathroom  He is sure she does not drink alcohol or use illicit drugs as she has only gotten up to go to the bathroom for the past 2 days  He says her pill bottles do not look like she has overdosed     [address] [facility],[address]                                 Natali Lopes Criteria for PE    Flowsheet Row Most Recent Value   Papa' Criteria for PE    Clinical signs and symptoms of DVT 3 Filed at: 09/19/2022 0911   PE is primary diagnosis or equally likely 0 Filed at: 09/19/2022 0911   HR >100 1 5 Filed at: 09/19/2022 0911   Immobilization at least 3 days or Surgery in the previous 4 weeks 0 Filed at: 09/19/2022 6873   Previous, objectively diagnosed PE or DVT 0 Filed at: 09/19/2022 0911   Hemoptysis 0 Filed at: 09/19/2022 2282   Malignancy with treatment within 6 months or palliative 0 Filed at: 09/19/2022 4862   Wells' Criteria Total 4 5 Filed at: 09/19/2022 0911            MDM  Number of Diagnoses or Management Options  ANUJA (acute kidney injury) ([facility] [address] ): new and requires workup  Altered mental status: new and requires workup  Elevated liver enzymes: new and requires workup  Hypoglycemia: new and requires workup  Hyponatremia: new and requires workup  Positive D dimer: new and requires workup  Swelling of right foot: new and requires workup  Diagnosis management comments: 47-year-old female presents with altered mental status  Patient apparently had a fall 4 days ago and presumably hit her head because she has an abrasion on her chin  Will CT head and C-spine  Patient denies taking any recreational drugs or overdosing on any of her medications  Will still check a coma panel  Will x-ray the right hip, ankle, and foot secondary to pain  Patient has swelling of the right ankle and is tachycardic, so will get a D-dimer to rule out clock  Will check other labs, urine, and chest x-ray to evaluate mental status change  Amount and/or Complexity of Data Reviewed  Clinical lab tests: ordered and reviewed  Tests in the radiology section of CPT®: ordered and reviewed  Review and summarize past medical records: yes  Discuss the patient with other providers: yes    Risk of Complications, Morbidity, and/or Mortality  Presenting problems: moderate  Diagnostic procedures: moderate  Management options: moderate    Patient Progress  Patient progress: stable    Patient was admitted to medicine for further workup and evaluation      Disposition  Final diagnoses: Altered mental status   Hyponatremia   Hypoglycemia   Elevated liver enzymes   Positive D dimer   Swelling of right foot   ANUJA (acute kidney injury) ([facility] [address] )     Time reflects when diagnosis was documented in both MDM as applicable and the Disposition within this note     Time User Action Codes Description Comment    9/19/2022 10:48 AM Asaf Fine Add [N17 9] ARF (acute renal failure) ([facility] [address] )     9/19/2022 10:48 AM Asaf Fine Add [R74 01] Transaminitis     9/19/2022 10:49 AM Darzainab Pandaet Add [R41 82] Altered mental status     9/19/2022 10:49 AM Hakim, Avis Gave Add [E87 1] Hyponatremia     9/19/2022 10:49 AM Hakim, Avis Gave Add [E16 2] Hypoglycemia     9/19/2022 10:49 AM Hakim, Avis Gave Add [R74 8] Elevated liver enzymes     9/19/2022 10:49 AM Hakim, Avis Gave Add [R79 89] Positive D dimer     9/19/2022 10:49 AM Hakim, Avis Gave Add [M79 89] Swelling of right foot     9/19/2022 10:50 AM Hakim, Avis Gave Add [N17 9] ANUJA (acute kidney injury) [facility])       ED Disposition     ED Disposition   Admit    Condition   Stable    Date/Time   Mon Sep 19, 2022 10:49 AM    Comment   Case was discussed with Dr Bright Hunter and the patient's admission status was agreed to be Admission Status: inpatient status to the service of Dr Bright Hunter              Follow-up Information    None         Current Discharge Medication List      CONTINUE these medications which have NOT CHANGED    Details   gabapentin (NEURONTIN) 300 mg capsule Take 300 mg by mouth 4 (four) times a day      methocarbamol (ROBAXIN) 500 mg tablet Take 1 tablet (500 mg total) by mouth 2 (two) times a day as needed for muscle spasms  Qty: 20 tablet, Refills: 0    Associated Diagnoses: Chronic right shoulder pain      omeprazole (PriLOSEC) 40 MG capsule TAKE ONE CAPSULE BY MOUTH EVERY DAY IN THE MORNING 30 MINUTES PRIOR TO EATING AS NEEDED  Qty: 30 capsule, Refills: 0    Associated Diagnoses: Esophagitis, reflux      QUEtiapine (SEROquel) 50 mg tablet Take half or whole table by mouth daily at bedtime  Qty: 30 tablet, Refills: 3    Associated Diagnoses: Generalized anxiety disorder; Severe episode of recurrent major depressive disorder, without psychotic features ([address] ); Other insomnia      !! venlafaxine (EFFEXOR-XR) 150 mg 24 hr capsule Take 1 capsule (150 mg total) by mouth in the morning  Qty: 90 capsule, Refills: 1    Associated Diagnoses: Severe episode of recurrent major depressive disorder, without psychotic features ([address] ); Generalized anxiety disorder      !! venlafaxine (EFFEXOR-XR) 37 5 mg 24 hr capsule TAKE ONE CAPSULE BY MOUTH EVERY MORNING  Qty: 90 capsule, Refills: 0    Associated Diagnoses: Severe episode of recurrent major depressive disorder, without psychotic features ([address] ); Generalized anxiety disorder      diclofenac (VOLTAREN) 75 mg EC tablet Take 75 mg by mouth 2 (two) times a day      nicotine (NICODERM CQ) 21 mg/24 hr TD 24 hr patch Place 1 patch on the skin every 24 hours  Qty: 28 patch, Refills: 1    Associated Diagnoses: Nicotine dependence      Nucynta 50 MG tablet Take 50 mg by mouth 3 (three) times a day as needed       !! - Potential duplicate medications found  Please discuss with provider  No discharge procedures on file  PDMP Review       Value Time User    PDMP Reviewed  Yes 6/10/2021  7:22 AM Ronal Mckeon PA-C           ED Provider  Attending physically available and evaluated Smith Stout I managed the patient along with the ED Attending      Electronically Signed by         Danney Cooks, DO  09/19/22 1327

## 2022-09-19 NOTE — ASSESSMENT & PLAN NOTE
· Secondary to fall on Friday  Does have a tense right lower extremity  · IV fluids per Nephrology  · Check imaging to rule out compartmental injury of right leg  · Check duplex to rule out DVT given elevated D-dimer    Continue empiric heparin infusion for now    Results from last 7 days   Lab Units 09/19/22  0829   CK TOTAL U/L 32,634*

## 2022-09-19 NOTE — ED ATTENDING ATTESTATION
9/19/2022  ICatrina DO, saw and evaluated the patient  I have discussed the patient with the resident/non-physician practitioner and agree with the resident's/non-physician practitioner's findings, Plan of Care, and MDM as documented in the resident's/non-physician practitioner's note, except where noted  All available labs and Radiology studies were reviewed  I was present for key portions of any procedure(s) performed by the resident/non-physician practitioner and I was immediately available to provide assistance  At this point I agree with the current assessment done in the Emergency Department  I have conducted an independent evaluation of this patient a history and physical is as follows:    ED Course     45 y o  F w/h/o depression/anxiety/suicide attempt, COPD, HTN, HLD p/w altered mental status  Per EMS,  found pt confused today  Fell 4 days ago and struck head but was not evaluated  Was okay afterwards  Pt unable to provide any history  Pt alert and only answers some questions with yes/no  Old abrasions to chin and right knee  Pupils PERRL  Tachy on exam  Lungs CTAB  Abd soft NT/ND  Right foot noted to be swollen  Complains of pain when right hip is ranged  No clonus  No rigidity  Plan: Labs, EKG, xrays RLE, CT head/Cspine      Critical Care Time  CriticalCare Time  Performed by: Mildred Sylvester DO  Authorized by: Mildred Sylvester DO     Critical care provider statement:     Critical care time (minutes):  45    Critical care time was exclusive of:  Separately billable procedures and treating other patients and teaching time    Critical care was necessary to treat or prevent imminent or life-threatening deterioration of the following conditions:  Metabolic crisis    Critical care was time spent personally by me on the following activities:  Blood draw for specimens, obtaining history from patient or surrogate, development of treatment plan with patient or surrogate, discussions with consultants, evaluation of patient's response to treatment, examination of patient, ordering and performing treatments and interventions, ordering and review of laboratory studies, ordering and review of radiographic studies, re-evaluation of patient's condition and review of old charts    I assumed direction of critical care for this patient from another provider in my specialty: no    Comments:      Pt with hypoglycemia requiring dextrose and multiple other metabolic abnormalities

## 2022-09-19 NOTE — CONSULTS
Consultation - [facility]  Keaton Rebolledo 45 y o  female MRN: [id number]  Unit/Bed#: [place] 2 -01 Encounter: [id number]        ASSESSMENT/PLAN:   Altered mental status  Elevated LFTs    Patient presented with altered mental status, after a fall on Friday, found to have acute kidney injury, hyponatremia, elevated CK consistent with rhabdomyolysis and elevated liver enzymes  AST is 1683 and ALT is 674, alkaline phosphatase and total bilirubin are within normal limits  This is likely all secondary to her rhabdomyolysis  No signs of liver injury on imaging  Ammonia levels within normal limits  Drug screen showed positive for opioids, she is on chronic pain medication for her back   is unsure if she has been taking additional medications  Tylenol level is normal   -follow LFTs    Consults    Reason for Consult / Principal Problem: <principal problem not specified>    HPI: Keaton Rebolledo is a 45y o  year old female with a PMH of depression, chronic back pain, who presented with altered mental status  Per her  she fell on Friday striking her head  Unclear how long she was down  He stated that she felt fine after and therefore did not present to the hospital   He states over the next few days she had been more lethargic and spent most of the day sleeping yesterday  He states today she seemed more confused and brought her to the hospital   Patient is unable to answer questions  On admission she was found to be hyponatremic, have acute kidney injury with a creatinine of 5 8, CK of 32,000  Were asked to see the patient due to elevated liver enzymes  AST is 1683, ALT is 674, alkaline phosphatase and total bilirubin are within normal limits  CT was done showing bilateral ground-glass opacities with intra and interlobular septal thickening      Review of Systems: as per HPI  Review of Systems   Unable to perform ROS: Mental status change       Historical Information   Past Medical History:   Diagnosis Date   • Chronic pain disorder    • Depression    • GERD (gastroesophageal reflux disease)    • History of electroconvulsive therapy    • Low back pain    • Self-injurious behavior    • Suicide attempt [facility])      Past Surgical History:   Procedure Laterality Date   •  SECTION     • COLONOSCOPY     • PANCREAS SURGERY      "pseudocysts" per patient's  Ricki Infante   • CO ESOPHAGOGASTRODUODENOSCOPY TRANSORAL DIAGNOSTIC N/A 4/10/2018    Procedure: EGD AND COLONOSCOPY;  Surgeon: Nay Small MD;  [place] [facility];   Service: Gastroenterology     Social History   Social History     Substance and Sexual Activity   Alcohol Use Yes    Comment: "occasional glass of wine"     Social History     Substance and Sexual Activity   Drug Use Not Currently   • Types: Marijuana, Cocaine    Comment: medical     Social History     Tobacco Use   Smoking Status Current Every Day Smoker   • Packs/day: 0 50   • Years: 35 00   • Pack years: 17 50   • Types: Cigarettes   Smokeless Tobacco Never Used     Family History   Problem Relation Age of Onset   • Arthritis Mother    • Coronary artery disease Mother         MI in her 63's   • Parkinsonism Father         with falls and SDH   • Parkinsonism Paternal Grandmother    • Coronary artery disease Paternal Grandfather    • Parkinsonism Paternal Aunt    • Colon cancer Family    • Depression Neg Hx        Meds/Allergies     Medications Prior to Admission   Medication   • gabapentin (NEURONTIN) 300 mg capsule   • methocarbamol (ROBAXIN) 500 mg tablet   • omeprazole (PriLOSEC) 40 MG capsule   • QUEtiapine (SEROquel) 50 mg tablet   • venlafaxine (EFFEXOR-XR) 150 mg 24 hr capsule   • venlafaxine (EFFEXOR-XR) 37 5 mg 24 hr capsule   • diclofenac (VOLTAREN) 75 mg EC tablet   • nicotine (NICODERM CQ) 21 mg/24 hr TD 24 hr patch   • Nucynta 50 MG tablet     Current Facility-Administered Medications   Medication Dose Route Frequency   • heparin (porcine) 25,000 units in 0 45% NaCl 250 mL infusion (premix)  3-30 Units/kg/hr (Order-Specific) Intravenous Titrated   • heparin (porcine) injection 2,400 Units  2,400 Units Intravenous Q1H PRN   • heparin (porcine) injection 4,800 Units  4,800 Units Intravenous Q1H PRN   • sodium chloride 0 9 % infusion  125 mL/hr Intravenous Continuous       Allergies   Allergen Reactions   • Chantix [Varenicline]    • Ibuprofen Other (See Comments)     Upset stomach   • Lyrica [Pregabalin] Other (See Comments)     bruising   • Penicillins Other (See Comments)     ? hives   • Sulfa Antibiotics Other (See Comments)     sloughing skin in mouth   • Sulfasalazine        Objective     Blood pressure 159/88, pulse 100, temperature 97 8 °F (36 6 °C), temperature source Oral, resp  rate 16, height 5' 6" (1 676 m), weight 62 6 kg (138 lb 0 1 oz), last menstrual period 03/14/2019, SpO2 93 %  Intake/Output Summary (Last 24 hours) at 9/19/2022 1333  Last data filed at 9/19/2022 1234  Gross per 24 hour   Intake 1000 ml   Output 400 ml   Net 600 ml       PHYSICAL EXAM     Physical Exam  Constitutional:       Appearance: Normal appearance  She is well-developed  HENT:      Head: Normocephalic  Comments: Abrasion to left chin  Eyes:      Conjunctiva/sclera: Conjunctivae normal    Cardiovascular:      Rate and Rhythm: Normal rate and regular rhythm  Pulmonary:      Effort: Pulmonary effort is normal       Breath sounds: Normal breath sounds  Abdominal:      General: Bowel sounds are normal  There is no distension  Palpations: Abdomen is soft  Tenderness: There is no abdominal tenderness  Musculoskeletal:         General: Normal range of motion  Cervical back: Normal range of motion  Skin:     General: Skin is warm and dry  Neurological:      Mental Status: She is alert        Comments: She knows her name and can tell me where she is however cannot tell me month or year         Lab Results:   CBC:   Lab Results   Component Value Date    WBC 9 38 09/19/2022    HGB 12 6 09/19/2022    HCT 38 4 09/19/2022     (H) 09/19/2022     (H) 09/19/2022    MCH 33 1 09/19/2022    MCHC 32 8 09/19/2022    RDW 13 4 09/19/2022    MPV 10 4 09/19/2022    NRBC 0 09/19/2022   ,   CMP:   Lab Results   Component Value Date    K 4 7 09/19/2022    CL 89 (L) 09/19/2022    CO2 24 09/19/2022    BUN 53 (H) 09/19/2022    CREATININE 5 82 (H) 09/19/2022    CALCIUM 7 5 (L) 09/19/2022    AST 1,683 (H) 09/19/2022     (H) 09/19/2022    ALKPHOS 98 09/19/2022    EGFR 7 09/19/2022   ,   Lipase: No results found for: LIPASE,  PT/INR:   Lab Results   Component Value Date    INR 1 11 09/19/2022   ,   Imaging Studies: I have personally reviewed pertinent reports  CT chest/abd/pelvis:    Bilateral groundglass opacities with superimposed inter and intralobular septal thickening  Differential diagnosis includes pulmonary hemorrhage, infection, and pulmonary edema, although the distribution is atypical for edema

## 2022-09-19 NOTE — ASSESSMENT & PLAN NOTE
· Secondary to rhabdomyolysis  No evidence of renal obstruction on imaging  · Appreciate nephrology evaluation  Continue IV fluids      Results from last 7 days   Lab Units 09/19/22  1222 09/19/22  0829   BUN mg/dL 53* 53*   CREATININE mg/dL 5 82* 5 88*   EGFR ml/min/1 73sq m 7 7

## 2022-09-19 NOTE — PLAN OF CARE
Problem: PAIN - ADULT  Goal: Verbalizes/displays adequate comfort level or baseline comfort level  Description: Interventions:  - Encourage patient to monitor pain and request assistance  - Assess pain using appropriate pain scale  - Administer analgesics based on type and severity of pain and evaluate response  - Implement non-pharmacological measures as appropriate and evaluate response  - Consider cultural and social influences on pain and pain management  - Notify physician/advanced practitioner if interventions unsuccessful or patient reports new pain  Outcome: Progressing     Problem: INFECTION - ADULT  Goal: Absence or prevention of progression during hospitalization  Description: INTERVENTIONS:  - Assess and monitor for signs and symptoms of infection  - Monitor lab/diagnostic results  - Monitor all insertion sites, i e  indwelling lines, tubes, and drains  - Monitor endotracheal if appropriate and nasal secretions for changes in amount and color  - [place] appropriate cooling/warming therapies per order  - Administer medications as ordered  - Instruct and encourage patient and family to use good hand hygiene technique  - Identify and instruct in appropriate isolation precautions for identified infection/condition  Outcome: Progressing  Goal: Absence of fever/infection during neutropenic period  Description: INTERVENTIONS:  - Monitor WBC    Outcome: Progressing     Problem: SAFETY ADULT  Goal: Patient will remain free of falls  Description: INTERVENTIONS:  - Educate patient/family on patient safety including physical limitations  - Instruct patient to call for assistance with activity   - Consult OT/PT to assist with strengthening/mobility   - Keep Call bell within reach  - Keep bed low and locked with side rails adjusted as appropriate  - Keep care items and personal belongings within reach  - Initiate and maintain comfort rounds  - Make Fall Risk Sign visible to staff  - Offer Toileting every  Hours, in advance of need  - Initiate/Maintain alarm  - Obtain necessary fall risk management equipment:   - Apply yellow socks and bracelet for high fall risk patients  - Consider moving patient to room near nurses station  Outcome: Progressing  Goal: Maintain or return to baseline ADL function  Description: INTERVENTIONS:  -  Assess patient's ability to carry out ADLs; assess patient's baseline for ADL function and identify physical deficits which impact ability to perform ADLs (bathing, care of mouth/teeth, toileting, grooming, dressing, etc )  - Assess/evaluate cause of self-care deficits   - Assess range of motion  - Assess patient's mobility; develop plan if impaired  - Assess patient's need for assistive devices and provide as appropriate  - Encourage maximum independence but intervene and supervise when necessary  - Involve family in performance of ADLs  - Assess for home care needs following discharge   - Consider OT consult to assist with ADL evaluation and planning for discharge  - Provide patient education as appropriate  Outcome: Progressing  Goal: Maintains/Returns to pre admission functional level  Description: INTERVENTIONS:  - Perform BMAT or MOVE assessment daily    - Set and communicate daily mobility goal to care team and patient/family/caregiver  - Collaborate with rehabilitation services on mobility goals if consulted  - Perform Range of Motion  times a day  - Reposition patient every  hours    - Dangle patient  times a day  - Stand patient  times a day  - Ambulate patient times a day  - Out of bed to chair  times a day   - Out of bed for meals times a day  - Out of bed for toileting  - Record patient progress and toleration of activity level   Outcome: Progressing     Problem: DISCHARGE PLANNING  Goal: Discharge to home or other facility with appropriate resources  Description: INTERVENTIONS:  - Identify barriers to discharge w/patient and caregiver  - Arrange for needed discharge resources and transportation as appropriate  - Identify discharge learning needs (meds, wound care, etc )  - Arrange for interpretive services to assist at discharge as needed  - Refer to Case Management Department for coordinating discharge planning if the patient needs post-hospital services based on physician/advanced practitioner order or complex needs related to functional status, cognitive ability, or social support system  Outcome: Progressing     Problem: Knowledge Deficit  Goal: Patient/family/caregiver demonstrates understanding of disease process, treatment plan, medications, and discharge instructions  Description: Complete learning assessment and assess knowledge base    Interventions:  - Provide teaching at level of understanding  - Provide teaching via preferred learning methods  Outcome: Progressing

## 2022-09-19 NOTE — ASSESSMENT & PLAN NOTE
· Secondary to rhabdomyolysis    No evidence of liver injury on CT imaging    Results from last 7 days   Lab Units 09/19/22  0829   AST U/L 1,683*   ALT U/L 674*   TOTAL BILIRUBIN mg/dL 0 58

## 2022-09-19 NOTE — CONSULTS
Consultation - Nephrology   Jonathan Sanford 45 y o  female MRN: [id number]  Unit/Bed#: ED 14 Encounter: [id number]    ASSESSMENT/PLAN:    ANUJA (POA)  -Baseline creatinine: 0 7-1 0  -Creatinine on admission 5 88  -Etiology:  Suspect due to rhabdomyolysis  -UA:  Ordered and pending  -Renal imaging:  CT abdomen pelvis pending  -Avoid hypotension, avoid nephrotoxins, avoid NSAIDS  -Trend BMP  -received 1 L normal saline on admission  -bladder scan/urinary retention protocol in setting of encephalopathy  -continue IV fluids isotonic saline at 125 mL/hour, requires frequent BMPs to avoid over-correction sodium level    Hyponatremia  -sodium 126 on admission, baseline appears normal  -suspect due to ANUJA/decreased ability to excrete free water, mid to be on Effexor as outpatient  -send urine studies, serum osmolality, a m  Cortisol, TSH is normal  -monitor with volume repletion, currently isotonic saline at 125 mL an hour  -goal correction of greater than 8 mEq over 24 improved, goal sodium by tomorrow at approximately 9:30 a m   Would be no greater than 134  -repeat stat BMP now then Q 8 with call parameters    Rhabdomyolysis  -history of recent fall/period of immobility  -CK 32,634 on admission, trend daily until falling  -trend urine output, continue IV fluids    Hyperkalemia  -potassium 5 4 on admission setting of ANUJA  -low-potassium diet when able to tolerate p o   -monitor with IV fluids  -repeat BMP now than q 8 hours call parameters    Anion gap acidosis  -anion gap 15, bicarbonate 24  -check lactic acid  -suspect due to ANUJA    Transaminitis  -AST greater than 1000, ALT elevated, alk-phos is normal, T bili normal  -suspect 2nd to rhabdomyolysis/elevated CK  -care per primary team  -pending CT abdomen pelvis without contrast    Encephalopathy  -CT head with no acute intracranial process  -care per primary   -TSH normal    Hypoglycemia  -glucose 56 on admission  -care per primary    Elevated D-dimer  -D-dimer 3 84  -on heparin drip  -care per primary    Chronic pain   -on gabapentin 300 mg q i d   -would avoid that dose in the setting of ANUJA, max recommended dose 300 mg daily    Addition medical problems:  Chronic pain on gabapentin, depression, GERD    HISTORY OF PRESENT ILLNESS:  Requesting Physician: Jono Diehl DO  Reason for Consult:  ANUJA Alcantara is a 45y o  year old female who was admitted to [facility] after presenting with altered mental status/confusion  Patient is poor historian, HPI obtained via notes review  Patient fell 4 days ago strike her head, was not evaluated medically and was reported to be fine after with by family, found to be confused per  in EMS was summoned  Multiple lab abnormalities noted on evaluation emergency department cleaning ANUJA, elevated CK, hyponatremia, hyperkalemia  A renal consultation is requested today for assistance in the management of ANUJA  PAST MEDICAL HISTORY:  Past Medical History:   Diagnosis Date   • Chronic pain disorder    • Depression    • GERD (gastroesophageal reflux disease)    • History of electroconvulsive therapy    • Low back pain    • Self-injurious behavior    • Suicide attempt ([facility] [address] )        PAST SURGICAL HISTORY:  Past Surgical History:   Procedure Laterality Date   •  SECTION     • COLONOSCOPY     • PANCREAS SURGERY      "pseudocysts" per patient's  Ricki Infante   • NE ESOPHAGOGASTRODUODENOSCOPY TRANSORAL DIAGNOSTIC N/A 4/10/2018    Procedure: EGD AND COLONOSCOPY;  Surgeon: Asia Paige MD;  [place] [facility]  GI LAB;   Service: Gastroenterology       ALLERGIES:  Allergies   Allergen Reactions   • Chantix [Varenicline]    • Ibuprofen Other (See Comments)     Upset stomach   • Lyrica [Pregabalin] Other (See Comments)     bruising   • Penicillins Other (See Comments)     ? hives   • Sulfa Antibiotics Other (See Comments)     sloughing skin in mouth   • Sulfasalazine        SOCIAL HISTORY:  Social History     Substance and Sexual Activity   Alcohol Use Yes    Comment: "occasional glass of wine"     Social History     Substance and Sexual Activity   Drug Use Not Currently   • Types: Marijuana, Cocaine    Comment: medical     Social History     Tobacco Use   Smoking Status Current Every Day Smoker   • Packs/day: 0 50   • Years: 35 00   • Pack years: 17 50   • Types: Cigarettes   Smokeless Tobacco Never Used       FAMILY HISTORY:  Family History   Problem Relation Age of Onset   • Arthritis Mother    • Coronary artery disease Mother         MI in her 63's   • Parkinsonism Father         with falls and SDH   • Parkinsonism Paternal Grandmother    • Coronary artery disease Paternal Grandfather    • Parkinsonism Paternal Aunt    • Colon cancer Family    • Depression Neg Hx        MEDICATIONS:    Current Facility-Administered Medications:   •  heparin (porcine) 25,000 units in 0 45% NaCl 250 mL infusion (premix), 3-30 Units/kg/hr (Order-Specific), Intravenous, Titrated, Rajwinder Hall DO, Last Rate: 10 8 mL/hr at 09/19/22 1053, 18 Units/kg/hr at 09/19/22 1053  •  heparin (porcine) injection 2,400 Units, 2,400 Units, Intravenous, Q1H PRN, Rajwinder Hall DO  •  heparin (porcine) injection 4,800 Units, 4,800 Units, Intravenous, Q1H PRN, Rajwinder Hall DO  •  sodium chloride 0 9 % infusion, 125 mL/hr, Intravenous, Continuous, Merdis Quiet, CRNP    Current Outpatient Medications:   •  gabapentin (NEURONTIN) 300 mg capsule, Take 300 mg by mouth 4 (four) times a day, Disp: , Rfl:   •  methocarbamol (ROBAXIN) 500 mg tablet, Take 1 tablet (500 mg total) by mouth 2 (two) times a day as needed for muscle spasms, Disp: 20 tablet, Rfl: 0  •  omeprazole (PriLOSEC) 40 MG capsule, TAKE ONE CAPSULE BY MOUTH EVERY DAY IN THE MORNING 30 MINUTES PRIOR TO EATING AS NEEDED, Disp: 30 capsule, Rfl: 0  •  QUEtiapine (SEROquel) 50 mg tablet, Take half or whole table by mouth daily at bedtime (Patient taking differently: Take whole table by mouth daily at bedtime), Disp: 30 tablet, Rfl: 3  •  venlafaxine (EFFEXOR-XR) 150 mg 24 hr capsule, Take 1 capsule (150 mg total) by mouth in the morning , Disp: 90 capsule, Rfl: 1  •  venlafaxine (EFFEXOR-XR) 37 5 mg 24 hr capsule, TAKE ONE CAPSULE BY MOUTH EVERY MORNING, Disp: 90 capsule, Rfl: 0  •  diclofenac (VOLTAREN) 75 mg EC tablet, Take 75 mg by mouth 2 (two) times a day (Patient not taking: No sig reported), Disp: , Rfl:   •  nicotine (NICODERM CQ) 21 mg/24 hr TD 24 hr patch, Place 1 patch on the skin every 24 hours (Patient not taking: Reported on 9/12/2022), Disp: 28 patch, Rfl: 1  •  Nucynta 50 MG tablet, Take 50 mg by mouth 3 (three) times a day as needed (Patient not taking: Reported on 9/7/2022), Disp: , Rfl:     REVIEW OF SYSTEMS:  Review of Systems   Unable to perform ROS: Mental status change        PHYSICAL EXAM:  Current Weight: Weight - Scale: 62 6 kg (138 lb 0 1 oz)  First Weight: Weight - Scale: 62 6 kg (138 lb 0 1 oz)  Vitals:    09/19/22 0810 09/19/22 1051   BP: 155/72 143/82   BP [place] Right arm Right arm   Pulse: (!) 109 102   Resp: 18 16   Temp: 98 5 °F (36 9 °C)    TempSrc: Oral    SpO2: 96% 93%   Weight: 62 6 kg (138 lb 0 1 oz)      No intake or output data in the 24 hours ending 09/19/22 1224  Physical Exam  Vitals and nursing note reviewed  Constitutional:       Appearance: She is ill-appearing  She is not toxic-appearing or diaphoretic  Comments: Awake in bed, repetitive statements, restless   HENT:      Head: Normocephalic  Comments: Multiple abrasions in various stages of healing     Nose: Nose normal       Mouth/Throat:      Mouth: Mucous membranes are dry  Eyes:      General: No scleral icterus  Cardiovascular:      Rate and Rhythm: Normal rate and regular rhythm  Pulses: Normal pulses  Heart sounds: Normal heart sounds  Pulmonary:      Effort: Pulmonary effort is normal  No respiratory distress  Breath sounds: Normal breath sounds  No wheezing or rales     Abdominal: General: Abdomen is flat  There is no distension  Palpations: Abdomen is soft  Tenderness: There is no abdominal tenderness  Musculoskeletal:      Cervical back: Neck supple  Right lower leg: No edema  Left lower leg: No edema  Comments: Decreased right foot strength when compared to left  Multiple abrasions on lower extremities in various stages of healing   Skin:     General: Skin is warm and dry  Capillary Refill: Capillary refill takes less than 2 seconds  Neurological:      GCS: GCS eye subscore is 3  GCS verbal subscore is 3  GCS motor subscore is 6  Invasive Devices:      Lab Results:   Results from last 7 days   Lab Units 09/19/22  0829   WBC Thousand/uL 9 38   HEMOGLOBIN g/dL 12 6   HEMATOCRIT % 38 4   PLATELETS Thousands/uL 397*   POTASSIUM mmol/L 5 4*   CHLORIDE mmol/L 87*   CO2 mmol/L 24   BUN mg/dL 53*   CREATININE mg/dL 5 88*   CALCIUM mg/dL 7 8*   ALK PHOS U/L 98   ALT U/L 674*   AST U/L 1,683*       I have personally reviewed the blood work as stated above and in my note  I have personally reviewed CT head/chest abdomen pelvis, chest x-ray imaging studies  I have personally reviewed emergency department note

## 2022-09-20 ENCOUNTER — APPOINTMENT (OUTPATIENT)
Dept: ULTRASOUND IMAGING | Facility: HOSPITAL | Age: 52
End: 2022-09-20

## 2022-09-20 LAB
ALBUMIN SERPL BCP-MCNC: 1.8 G/DL (ref 3.5–5)
ALP SERPL-CCNC: 79 U/L (ref 46–116)
ALT SERPL W P-5'-P-CCNC: 512 U/L (ref 12–78)
ANION GAP SERPL CALCULATED.3IONS-SCNC: 12 MMOL/L (ref 4–13)
ANION GAP SERPL CALCULATED.3IONS-SCNC: 12 MMOL/L (ref 4–13)
ANION GAP SERPL CALCULATED.3IONS-SCNC: 14 MMOL/L (ref 4–13)
ANION GAP SERPL CALCULATED.3IONS-SCNC: 9 MMOL/L (ref 4–13)
APTT PPP: 54 SECONDS (ref 23–37)
APTT PPP: 57 SECONDS (ref 23–37)
APTT PPP: 67 SECONDS (ref 23–37)
APTT PPP: 76 SECONDS (ref 23–37)
AST SERPL W P-5'-P-CCNC: 970 U/L (ref 5–45)
BILIRUB DIRECT SERPL-MCNC: 0.15 MG/DL (ref 0–0.2)
BILIRUB SERPL-MCNC: 0.35 MG/DL (ref 0.2–1)
BUN SERPL-MCNC: 52 MG/DL (ref 5–25)
BUN SERPL-MCNC: 53 MG/DL (ref 5–25)
BUN SERPL-MCNC: 54 MG/DL (ref 5–25)
BUN SERPL-MCNC: 55 MG/DL (ref 5–25)
CALCIUM SERPL-MCNC: 7.5 MG/DL (ref 8.3–10.1)
CALCIUM SERPL-MCNC: 7.5 MG/DL (ref 8.3–10.1)
CALCIUM SERPL-MCNC: 7.7 MG/DL (ref 8.3–10.1)
CALCIUM SERPL-MCNC: 7.8 MG/DL (ref 8.3–10.1)
CHLORIDE SERPL-SCNC: 94 MMOL/L (ref 96–108)
CHLORIDE SERPL-SCNC: 95 MMOL/L (ref 96–108)
CHLORIDE SERPL-SCNC: 96 MMOL/L (ref 96–108)
CHLORIDE SERPL-SCNC: 97 MMOL/L (ref 96–108)
CK MB SERPL-MCNC: 1.2 % (ref 0–2.5)
CK MB SERPL-MCNC: 166.7 NG/ML (ref 0–5)
CK SERPL-CCNC: ABNORMAL U/L (ref 26–192)
CO2 SERPL-SCNC: 21 MMOL/L (ref 21–32)
CO2 SERPL-SCNC: 23 MMOL/L (ref 21–32)
CORTIS SERPL-MCNC: 20 UG/DL
CREAT SERPL-MCNC: 6.21 MG/DL (ref 0.6–1.3)
CREAT SERPL-MCNC: 6.53 MG/DL (ref 0.6–1.3)
CREAT SERPL-MCNC: 6.64 MG/DL (ref 0.6–1.3)
CREAT SERPL-MCNC: 6.73 MG/DL (ref 0.6–1.3)
GFR SERPL CREATININE-BSD FRML MDRD: 6 ML/MIN/1.73SQ M
GFR SERPL CREATININE-BSD FRML MDRD: 7 ML/MIN/1.73SQ M
GLUCOSE SERPL-MCNC: 100 MG/DL (ref 65–140)
GLUCOSE SERPL-MCNC: 102 MG/DL (ref 65–140)
GLUCOSE SERPL-MCNC: 62 MG/DL (ref 65–140)
GLUCOSE SERPL-MCNC: 88 MG/DL (ref 65–140)
GLUCOSE SERPL-MCNC: 90 MG/DL (ref 65–140)
HAV IGM SER QL: NORMAL
HBV CORE IGM SER QL: NORMAL
HBV SURFACE AG SER QL: NORMAL
HCV AB SER QL: NORMAL
OSMOLALITY UR/SERPL-RTO: 291 MMOL/KG (ref 282–298)
POTASSIUM SERPL-SCNC: 4.6 MMOL/L (ref 3.5–5.3)
POTASSIUM SERPL-SCNC: 4.7 MMOL/L (ref 3.5–5.3)
POTASSIUM SERPL-SCNC: 4.8 MMOL/L (ref 3.5–5.3)
POTASSIUM SERPL-SCNC: 4.9 MMOL/L (ref 3.5–5.3)
PROCALCITONIN SERPL-MCNC: 0.31 NG/ML
PROT SERPL-MCNC: 5.4 G/DL (ref 6.4–8.4)
SODIUM SERPL-SCNC: 129 MMOL/L (ref 135–147)
SODIUM SERPL-SCNC: 132 MMOL/L (ref 135–147)

## 2022-09-20 RX ADMIN — HEPARIN SODIUM 2400 UNITS: 1000 INJECTION INTRAVENOUS; SUBCUTANEOUS at 16:14

## 2022-09-20 RX ADMIN — HEPARIN SODIUM 2400 UNITS: 1000 INJECTION INTRAVENOUS; SUBCUTANEOUS at 08:56

## 2022-09-20 RX ADMIN — HEPARIN SODIUM 22 UNITS/KG/HR: 10000 INJECTION, SOLUTION INTRAVENOUS at 08:55

## 2022-09-20 RX ADMIN — VENLAFAXINE HYDROCHLORIDE 150 MG: 150 CAPSULE, EXTENDED RELEASE ORAL at 08:55

## 2022-09-20 RX ADMIN — Medication 1 PATCH: at 08:55

## 2022-09-20 RX ADMIN — SODIUM CHLORIDE 125 ML/HR: 0.9 INJECTION, SOLUTION INTRAVENOUS at 05:16

## 2022-09-20 RX ADMIN — QUETIAPINE FUMARATE 25 MG: 25 TABLET ORAL at 22:58

## 2022-09-20 RX ADMIN — SODIUM CHLORIDE 125 ML/HR: 0.9 INJECTION, SOLUTION INTRAVENOUS at 22:56

## 2022-09-20 RX ADMIN — SODIUM CHLORIDE 125 ML/HR: 0.9 INJECTION, SOLUTION INTRAVENOUS at 12:58

## 2022-09-20 NOTE — PLAN OF CARE
Problem: PAIN - ADULT  Goal: Verbalizes/displays adequate comfort level or baseline comfort level  Description: Interventions:  - Encourage patient to monitor pain and request assistance  - Assess pain using appropriate pain scale  - Administer analgesics based on type and severity of pain and evaluate response  - Implement non-pharmacological measures as appropriate and evaluate response  - Consider cultural and social influences on pain and pain management  - Notify physician/advanced practitioner if interventions unsuccessful or patient reports new pain  Outcome: Progressing     Problem: INFECTION - ADULT  Goal: Absence or prevention of progression during hospitalization  Description: INTERVENTIONS:  - Assess and monitor for signs and symptoms of infection  - Monitor lab/diagnostic results  - Monitor all insertion sites, i e  indwelling lines, tubes, and drains  - Monitor endotracheal if appropriate and nasal secretions for changes in amount and color  - Dwight appropriate cooling/warming therapies per order  - Administer medications as ordered  - Instruct and encourage patient and family to use good hand hygiene technique  - Identify and instruct in appropriate isolation precautions for identified infection/condition  Outcome: Progressing     Problem: SAFETY ADULT  Goal: Patient will remain free of falls  Description: INTERVENTIONS:  - Educate patient/family on patient safety including physical limitations  - Instruct patient to call for assistance with activity   - Consult OT/PT to assist with strengthening/mobility   - Keep Call bell within reach  - Keep bed low and locked with side rails adjusted as appropriate  - Keep care items and personal belongings within reach  - Initiate and maintain comfort rounds  - Make Fall Risk Sign visible to staff  Problem: DISCHARGE PLANNING  Goal: Discharge to home or other facility with appropriate resources  Description: INTERVENTIONS:  - Identify barriers to discharge w/patient and caregiver  - Arrange for needed discharge resources and transportation as appropriate  - Identify discharge learning needs (meds, wound care, etc )  - Arrange for interpretive services to assist at discharge as needed  - Refer to Case Management Department for coordinating discharge planning if the patient needs post-hospital services based on physician/advanced practitioner order or complex needs related to functional status, cognitive ability, or social support system  Outcome: Progressing     - Apply yellow socks and bracelet for high fall risk patients  - Consider moving patient to room near nurses station  Outcome: Progressing

## 2022-09-20 NOTE — PLAN OF CARE
Problem: OCCUPATIONAL THERAPY ADULT  Goal: Performs self-care activities at highest level of function for planned discharge setting  See evaluation for individualized goals  Description: Treatment Interventions: ADL retraining, Functional transfer training, Patient/family training, Equipment evaluation/education, Cognitive reorientation, Endurance training          See flowsheet documentation for full assessment, interventions and recommendations  Outcome: Progressing  Note: Limitation: Decreased ADL status, Decreased Safe judgement during ADL, Decreased cognition, Decreased endurance, Decreased high-level ADLs     Assessment: Pt is a 45 y o  female seen for OT evaluation s/p admit to [facility] on 9/19/2022 w/ Encephalopathy acute  Comorbidities affecting pt's functional performance at time of assessment include: limited communication, previous surgery, limited cognition and chronic pain, depression  Personal factors affecting pt at time of IE include:difficulty performing ADLS, difficulty performing IADLS , limited insight into deficits and decreased initiation and engagement   Pt is poor historian and PLOF is unknown  Upon evaluation: Pt requires Minimal Assistance to Moderate Assistance x2 for functional mobility, and Maximal Assistance to total assistance for basic self care 2* the following deficits impacting occupational performance: weakness, decreased ROM, decreased strength, decreased balance, decreased tolerance, impaired [address], impaired arousal, impaired attention, impaired initiation, impaired memory, impaired sequencing and impaired problem solving  Pt to benefit from continued skilled OT tx while in the hospital to address deficits as defined above and maximize level of functional independence w ADL's and functional mobility  Occupational Performance areas to address include: eating, grooming, bathing/shower, toilet hygiene, dressing and functional mobility   From OT standpoint, recommendation at time of d/c would be inpatient rehab       OT Discharge Recommendation: Post acute rehabilitation services        Susannah Lopez, OTR/L

## 2022-09-20 NOTE — OCCUPATIONAL THERAPY NOTE
Occupational Therapy Evaluation     Patient Name: Rommel Figueredo  Today's Date: 2022    Problem List  Principal Problem:    Encephalopathy acute  Active Problems:    Depression    Tobacco abuse    DDD (degenerative disc disease), lumbosacral    GERD (gastroesophageal reflux disease)    ARF (acute renal failure) (HCC)    Transaminitis    Abnormal CT of the chest    Traumatic rhabdomyolysis [facility])    Past Medical History  Past Medical History:   Diagnosis Date    Chronic pain disorder     Depression     GERD (gastroesophageal reflux disease)     History of electroconvulsive therapy     Low back pain     Self-injurious behavior     Suicide attempt [facility])      Past Surgical History  Past Surgical History:   Procedure Laterality Date     SECTION      COLONOSCOPY      PANCREAS SURGERY      "pseudocysts" per patient's  Ricki Infante    ND ESOPHAGOGASTRODUODENOSCOPY TRANSORAL DIAGNOSTIC N/A 4/10/2018    Procedure: EGD AND COLONOSCOPY;  Surgeon: Cecy Harman MD;  [place] [facility]  GI LAB; Service: Gastroenterology         22 0946   OT Last Visit   OT Visit Date 22   Note Type   Note type Evaluation   Restrictions/Precautions   Weight Bearing Precautions Per Order No   Other Precautions Cognitive; Chair Alarm; Bed Alarm;Multiple lines; Fall Risk   Pain Assessment   Pain Assessment Tool FLACC   Pain Score No Pain   Pain Location/Orientation [place] [facility] Pain Intervention(s) Repositioned; Ambulation/increased activity   Multiple Pain Sites No   Pain Rating: FLACC (Rest) - Face 1   Pain Rating: FLACC (Rest) - Legs 0   Pain Rating: FLACC (Rest) - Activity 1   Pain Rating: FLACC (Rest) - Cry 0   Pain Rating: FLACC (Rest) - Consolability 0   Score: FLACC (Rest) 2   Pain Rating: FLACC (Activity) - Face 1   Pain Rating: FLACC (Activity) - Legs 1   Pain Rating: FLACC (Activity) - Activity 1   Pain Rating: FLACC (Activity) - Cry 0   Pain Rating: FLACC (Activity) - Consolability 1   Score: FLACC (Activity) 4   Home Living   Type of Home House   Additional Comments pt is poor historian and unable to provide home set up and PLOF   Prior Function   Lives With [address] in the last 6 months 1 to 4  (at least 1 recent fall leading to admission)   Comments pt is poor historian and unable to provide home set up and PLOF   Psychosocial   Psychosocial (WDL) X   Patient Behaviors/Mood Flat affect  (hyperkinetic (facial movements and LLE))   Subjective   Subjective Pt stated "ok" to mobilizing with therapy   ADL   Eating Assistance 2  Maximal Assistance   Eating Deficit Setup;Supervision/safety; Increased time to complete;Bringing food to mouth assist  (pt unable to grasp utinsil or bring food appropriately to mouth )   Grooming Assistance 2  Maximal Assistance   Grooming Deficit Setup;Supervision/safety; Increased time to complete   UB Dressing Assistance 2  Maximal Assistance   LB Dressing Assistance 1  Total Assistance   LB Dressing Deficit Don/doff R sock; Don/doff L sock   Toileting Assistance  1  Total Assistance   Bed Mobility   Supine to Sit 4  Minimal assistance   Additional items Assist x 1;HOB elevated; Bedrails; Increased time required;Verbal cues;LE management   Sit to Supine 4  Minimal assistance   Additional items Assist x 1; Increased time required;Verbal cues;LE management   Transfers   Sit to Stand 4  Minimal assistance   Additional items Assist x 2; Increased time required;Verbal cues  (with RW)   Stand to Sit 4  Minimal assistance   Additional items Assist x 2; Increased time required;Verbal cues  (2nd person present for safety)   Functional Mobility   Functional Mobility 4  Minimal assistance   Additional Comments assist x1, 2nd person present for safety   with RW   Activity Tolerance   Activity Tolerance Treatment limited secondary to medical complications (Comment)  (impaired cognition)   Medical Staff Made Aware Waleska PT, [facility]  Pt seen for co-evaluation with Physical Therapist due to pt's medical complexity, functional limitations and limited activity tolerance  Nurse Made Aware Gabriella Beams RN   RUE Assessment   RUE Assessment   (Grossly WFL)   LUE Assessment   LUE Assessment   (Grossly WFL)   Hand Function   Gross Motor Coordination Functional   Fine Motor Coordination Impaired   Sensation   Light Touch Not tested   Vision-Basic Assessment   Current Vision Other (Comment)  (unable to accurately assess  Pt with impaired cognition, eyes closed most of session)   Vision - Complex Assessment   Acuity   (Pt unable to read white board or clock (2/2 cognition vs vision?))   Cognition   Overall Cognitive Status Impaired   Arousal/Participation Responsive;Persistent stimuli required   Attention Difficulty attending to directions   Orientation Level Oriented to person;Oriented to place; Disoriented to time;Disoriented to situation   Memory Decreased recall of precautions;Decreased recall of recent events;Decreased short term memory;Decreased recall of biographical information   Following Commands Follows one step commands inconsistently   Comments Pt inconsistently responded to questions, yes/no or open ended  Able to provide some spontaneous responses, but overall only 50% of responses appeared appropriate  Required frequent cueing   Assessment   Limitation Decreased ADL status; Decreased Safe judgement during ADL;Decreased cognition;Decreased endurance;Decreased high-level ADLs   Assessment Pt is a 45 y o  female seen for OT evaluation s/p admit to [facility] on 9/19/2022 w/ Encephalopathy acute  Comorbidities affecting pt's functional performance at time of assessment include: limited communication, previous surgery, limited cognition and chronic pain, depression  Personal factors affecting pt at time of IE include:difficulty performing ADLS, difficulty performing IADLS , limited insight into deficits and decreased initiation and engagement   Pt is poor historian and PLOF is unknown  Upon evaluation: Pt requires Minimal Assistance to Moderate Assistance x2 for functional mobility, and Maximal Assistance to total assistance for basic self care 2* the following deficits impacting occupational performance: weakness, decreased ROM, decreased strength, decreased balance, decreased tolerance, impaired [address], impaired arousal, impaired attention, impaired initiation, impaired memory, impaired sequencing and impaired problem solving  Pt to benefit from continued skilled OT tx while in the hospital to address deficits as defined above and maximize level of functional independence w ADL's and functional mobility  Occupational Performance areas to address include: eating, grooming, bathing/shower, toilet hygiene, dressing and functional mobility  From OT standpoint, recommendation at time of d/c would be inpatient rehab  Goals   Patient Goals none stated 2/2 impaired cognition   Plan   Treatment Interventions ADL retraining;Functional transfer training;Patient/family training;Equipment evaluation/education;Cognitive reorientation; Endurance training   Goal Expiration Date 10/04/22   OT Treatment Day 0   OT Frequency 3-5x/wk   Recommendation   OT Discharge Recommendation Post acute rehabilitation services   Additional Comments  The patient's raw score on the AM-PAC Daily Activity inpatient short form low function score is 14, standardized score is Low Function Daily Activity Standardized Score: 24 79  Patients with a standardized score less than 39 4 are likely to benefit from discharge to post-acute rehab services  Please refer to the recommendation of the Occupational Therapist for safe discharge planning     AM-PAC Daily Activity Inpatient   Lower Body Dressing 1   Bathing 1   Toileting 1   Upper Body Dressing 2   Grooming 2   Eating 2   Daily Activity Raw Score 9   Turning Head Towards Sound 3   Follow Simple Instructions 2   Low Function Daily Activity Raw Score 14   Low Function Daily Activity Standardized Score 24 79   AM-PAC Applied Cognition Inpatient   Following a Speech/Presentation 2   Understanding Ordinary Conversation 2   Taking Medications 1   Remembering Where Things Are Placed or Put Away 2   Remembering List of 4-5 Errands 1   Taking Care of Complicated Tasks 1   Applied Cognition Raw Score 9   Applied Cognition Standardized Score 22 48       Goals: to be met by 10/4/22    Patient will perform functional bed mobility with Standby Assistance, with HOB flat, no rails  Patient will perform functional transfers with [place] in preparation for ADL tasks, with good safety awareness  Patient will perform UB dressing task with Moderate Assistance while seated, with set up  Patient will perform LB dressing task with Moderate Assistance  Patient will perform toilet transfer with Minimal Assistance to [facility]  Patient will perform toileting with Moderate Assistance, including hygiene and clothing management   Patient will improve activity tolerance by participating in 20 minutes of session at a time in preparation for participation in ADL tasks  Patient will identify 3 potential fall hazards and identify compensatory techniques to decrease fall risk in the home environment  Patient will attend to 100% of cognitive task during session   Patient will will maintain attention to task for 10 minutes during session   Patient will follow 1 step command(s) 100% of the time during session   Patient will turn towards auditory stimuli 100% of the time during session   Patient will initiate face washing task with independence when presented with wash cloth, without verbal cues       Jennifer Ely, ASHLEER/L

## 2022-09-20 NOTE — PROGRESS NOTES
[address] [facility]  Progress Note - Vianey June 1970, 45 y o  female MRN: [id number]  Unit/Bed#: [address] 217-01 Encounter: [id number]  Primary Care Provider: Dorothea Alexander MD   Date and time admitted to hospital: 9/19/2022  8:04 AM    * Encephalopathy acute  Assessment & Plan  · Acute encephalopathy appears secondary to gabapentin and morphine with subsequent renal failure  · Also had hypoglycemia requiring D50  No further hypoglycemia  · Continue holding gabapentin and morphine for now  Restart when appropriate  ARF (acute renal failure) ([facility] [address] )  Assessment & Plan  · Secondary to rhabdomyolysis  No evidence of renal obstruction on imaging  · Appreciate nephrology evaluation  Awaiting renal recovery  Continue IV fluids  Results from last 7 days   Lab Units 09/20/22  1526 09/20/22  0909 09/19/22  2350 09/19/22  1708 09/19/22  1222 09/19/22  0829   BUN mg/dL 54* 55* 53* 54* 53* 53*   CREATININE mg/dL 6 64* 6 73* 6 21* 6 03* 5 82* 5 88*   EGFR ml/min/1 73sq m 6 6 7 7 7 7       Transaminitis  Assessment & Plan  · Secondary to rhabdomyolysis  No evidence of liver injury on CT imaging  · GI following    Results from last 7 days   Lab Units 09/20/22  0606 09/19/22  0829   AST U/L 970* 1,683*   ALT U/L 512* 674*   TOTAL BILIRUBIN mg/dL 0 35 0 58       Traumatic rhabdomyolysis ([facility] [address] )  Assessment & Plan  · Secondary to fall on Friday  IV fluids per Nephrology  · Venous duplex negative for DVT  Elevated D-dimer continue empiric heparin infusion for now    Results from last 7 days   Lab Units 09/20/22  0606 09/19/22  0829   CK TOTAL U/L 14,147* 32,634*       Abnormal CT of the chest  Assessment & Plan  · Left-sided opacities noted  COVID negative    · Procalcitonin only minimally elevated doubt active infection    Results from last 7 days   Lab Units 09/20/22  0606   PROCALCITONIN ng/ml 0 31*       GERD (gastroesophageal reflux disease)  Assessment & Plan  · Holding PPI given renal injury    DDD (degenerative disc disease), lumbosacral  Assessment & Plan  · Lumbar radiculopathy due for surgery  · Prior to admission was on gabapentin and morphine IR 15 mg   · Confirmed on   Holding both for now given kidney injury  Tobacco abuse  Assessment & Plan  · Nicotine patch ordered    Depression  Assessment & Plan  · Continue venlafaxine and quetiapine      VTE Pharmacologic Prophylaxis:  Moderate Risk (Score 3-4) - Pharmacological DVT Prophylaxis Ordered: heparin drip  Patient Centered Rounds: I have performed bedside rounds with nursing staff today  Discussions with Specialists or Other Care Team Provider:  GI and nephrology    Education and Discussions with Family / Patient: Updated  () at bedside  Time Spent for Care: 25 mins  More than 50% of total time spent on counseling and coordination of care as described above  Current Length of Stay: 1 day(s)  Current Patient Status: Inpatient   Certification Statement: The patient will continue to require additional inpatient hospital stay due to persistent renal failure  Discharge Plan / Estimated Discharge Date: Anticipate discharge in >72 hrs to discharge location to be determined pending rehab evaluations  Code Status: Level 1 - Full Code      Subjective:   Patient seen and examined  Still confused but awake  Still having right leg pains    Objective:   Vitals: Blood pressure 136/85, pulse 104, temperature 98 °F (36 7 °C), temperature source Oral, resp  rate 18, height 5' 6" (1 676 m), weight 62 6 kg (138 lb 0 1 oz), last menstrual period 03/14/2019, SpO2 95 %  Intake/Output Summary (Last 24 hours) at 9/20/2022 1659  Last data filed at 9/20/2022 1100  Gross per 24 hour   Intake 2180 85 ml   Output 400 ml   Net 1780  85 ml       Physical Exam  Vitals reviewed  Constitutional:       General: She is not in acute distress  Appearance: Normal appearance  HENT:      Head: Atraumatic     Eyes:      General: No scleral icterus  Cardiovascular:      Rate and Rhythm: Regular rhythm  Heart sounds: Normal heart sounds  Pulmonary:      Breath sounds: Decreased breath sounds present  No wheezing  Abdominal:      General: Bowel sounds are normal       Palpations: Abdomen is soft  Tenderness: There is no guarding or rebound  Musculoskeletal:         General: Swelling and tenderness present  Comments: Right calf   Skin:     General: Skin is warm  Neurological:      Mental Status: She is alert  She is disoriented  Psychiatric:         Mood and Affect: Mood normal        Additional Data:   Labs:  Results from last 7 days   Lab Units 09/19/22  1047 09/19/22  0829   WBC Thousand/uL  --  9 38   HEMOGLOBIN g/dL  --  12 6   PLATELETS Thousands/uL  --  397*   MCV fL  --  101*   INR  1 11  --      Results from last 7 days   Lab Units 09/20/22  1526 09/20/22  0909 09/20/22  0606 09/19/22  2350 09/19/22  1222 09/19/22  0829   SODIUM mmol/L 129* 132*  --  129*   < > 126*   POTASSIUM mmol/L 4 8 4 7  --  4 9   < > 5 4*   CHLORIDE mmol/L 96 95*  --  94*   < > 87*   CO2 mmol/L 21 23  --  23   < > 24   ANION GAP mmol/L 12 14*  --  12   < > 15*   BUN mg/dL 54* 55*  --  53*   < > 53*   CREATININE mg/dL 6 64* 6 73*  --  6 21*   < > 5 88*   CALCIUM mg/dL 7 5* 7 7*  --  7 5*   < > 7 8*   ALBUMIN g/dL  --   --  1 8*  --   --  2 3*   TOTAL BILIRUBIN mg/dL  --   --  0 35  --   --  0 58   ALK PHOS U/L  --   --  79  --   --  98   ALT U/L  --   --  512*  --   --  674*   AST U/L  --   --  970*  --   --  1,683*   EGFR ml/min/1 73sq m 6 6  --  7   < > 7   GLUCOSE RANDOM mg/dL 102 88  --  62*   < > 56*    < > = values in this interval not displayed           Results from last 7 days   Lab Units 09/20/22  0606 09/19/22  0829   CK TOTAL U/L 14,147* 32,634*   CK MB INDEX % 1 2 1 1     Results from last 7 days   Lab Units 09/19/22  1220 09/19/22  1047 09/19/22  0829   HS TNI 0HR ng/L  --   --  80*   HS TNI 2HR ng/L  --  73*  --    HS TNI 4HR ng/L 84*  --   --           Results from last 7 days   Lab Units 09/20/22  0606 09/19/22  1222   LACTIC ACID mmol/L  --  0 4*   PROCALCITONIN ng/ml 0 31*  --      Results from last 7 days   Lab Units 09/20/22  1154   POC GLUCOSE mg/dl 90         Results from last 7 days   Lab Units 09/19/22  0829   TSH 3RD GENERATON uIU/mL 3 047     * I Have Reviewed All Lab Data Listed Above  Cultures:   Results from last 7 days   Lab Units 09/19/22  1401   INFLUENZA A PCR  Negative       Results from last 7 days   Lab Units 09/19/22  1401   SARS-COV-2  Negative   INFLUENZA A PCR  Negative   INFLUENZA B PCR  Negative   RSV PCR  Negative           Lines/Drains:  Invasive Devices  Report    Peripheral Intravenous Line  Duration           Peripheral IV 09/19/22 Left Forearm 1 day          Drain  Duration           Urethral Catheter 16 Fr  <1 day              Telemetry:      Imaging:  Imaging Reports Reviewed Today Include:   CT chest abdomen pelvis wo contrast    Result Date: 9/19/2022  Impression: Bilateral groundglass opacities with superimposed inter and intralobular septal thickening  Differential diagnosis includes pulmonary hemorrhage, infection, and pulmonary edema, although the distribution is atypical for edema  The study was marked in [facility] for immediate notification  Workstation performed: [id number]     XR hip/pelv 2-3 vws right    Result Date: 9/19/2022  Impression: No acute osseous abnormality  Workstation performed: [id number]     XR ankle 3+ views RIGHT    Result Date: 9/19/2022  Impression: No acute osseous abnormality  Workstation performed: [id number]     XR foot 3+ views RIGHT    Result Date: 9/19/2022  Impression: No acute osseous abnormality  Workstation performed: [id number]     CT head without contrast    Result Date: 9/19/2022  Impression: No acute intracranial process  No skull fracture   Workstation performed: [id number]     CT spine cervical without contrast    Result Date: 9/19/2022  Impression: No cervical spine fracture or traumatic malalignment  Incidental finding of partially visualized subpleural groundglass opacity in the left pulmonary apex presumably scarring  Cannot exclude infiltrate  This study demonstrates a significant  finding and was documented as such in [facility] for liaison and referring practitioner notification  Workstation performed: [id number]     XR chest 1 view    Result Date: 9/19/2022  Impression: No acute cardiopulmonary disease  Findings are stable Workstation performed: [id number]     US right upper quadrant with liver dopplers    Result Date: 9/20/2022  Impression: 1  Minimal layering sludge without evidence of acute cholecystitis  2   Normal-appearing liver with normal liver Dopplers  Workstation performed: [id number]     CT lower extremity wo contrast right    Result Date: 9/19/2022  Impression: There is no evidence of intramuscular hematoma or other mass lesion in the right calf  Please note that rhabdomyolysis and compartment syndrome are clinical diagnoses, and are not excluded based on these imaging findings   Workstation performed: [id number]       Scheduled Meds:  Current Facility-Administered Medications   Medication Dose Route Frequency Provider Last Rate   • acetaminophen  650 mg Oral Q6H PRN Kapil Mtz DO     • heparin (porcine)  3-30 Units/kg/hr (Order-Specific) Intravenous Titrated Rajwinder Hall DO 24 Units/kg/hr (09/20/22 1614)   • heparin (porcine)  2,400 Units Intravenous Q1H PRN Rajwinder Hall DO     • heparin (porcine)  4,800 Units Intravenous Q1H PRN Rajwinder Chapmanm DO     • nicotine  1 patch Transdermal Daily Atul Adan DO     • ondansetron  4 mg Intravenous Q4H PRN Kapil Mtz DO     • QUEtiapine  25 mg Oral HS Atul Adan DO     • sodium chloride  125 mL/hr Intravenous Continuous Heddy Patel, CRNP 125 mL/hr (09/20/22 1258)   • venlafaxine  150 mg Oral Daily Kapil Mtz DO         Today, Patient Was Seen By: Kapil Mtz DO    ** Please Note: Dictation voice to text software may have been used in the creation of this document   **

## 2022-09-20 NOTE — CASE MANAGEMENT
Case Management Discharge Planning Note    Patient name [facility]  Location [facility] Dave Schuler* MRN [id number]  : 1970 Date 2022       Current Admission Date: 2022  Current Admission Diagnosis:Encephalopathy acute   Patient Active Problem List    Diagnosis Date Noted   • ARF (acute renal failure) ([facility] [address] ) 2022   • Transaminitis 2022   • Encephalopathy acute 2022   • Abnormal CT of the chest 2022   • Traumatic rhabdomyolysis ([facility] [address] ) 2022   • Hyperglycemia 2022   • Opioid dependence ([facility] [address] ) 2021   • Chronic right shoulder pain 2021   • Internal hemorrhoids 2020   • Adnexal cyst 2020   • Ischemic colitis ([facility] [address] ) 2020   • GERD (gastroesophageal reflux disease)    • Intercostal neuralgia    • Hematochezia 2019   • Insomnia 2019   • History of rib fracture 10/28/2018   • Tobacco abuse 10/28/2018   • Right knee pain 2018   • Generalized anxiety disorder 2018   • Hypertension    • Hyperlipidemia    • Depression    • COPD (chronic obstructive pulmonary disease) ([facility] [address] )    • Chondromalacia patellae 2018   • Irritable bowel syndrome with diarrhea 2018   • DDD (degenerative disc disease), lumbosacral 2014      LOS (days): 1  Geometric Mean LOS (GMLOS) (days):   Days to GMLOS:     OBJECTIVE:  Risk of Unplanned Readmission Score: 21 08         Current admission status: Inpatient   Preferred Pharmacy:   [address] [place] [address]  Phone: [phone] Fax: [phone] [address], [address] [place] [address]  Phone: [phone] Fax: [phone]    Primary Care Provider: Melo Lester MD    Primary Insurance: BLUE CROSS  Secondary Insurance:     DISCHARGE DETAILS:    Discharge planning discussed with[de-identified]  Ricki  Freedom of Choice: Yes  Comments - Freedom of Choice: Discussed as it relates to d/c planning based on treatment team recommendations   agrees to blanket referrasl for STR    CM contacted family/caregiver?: Yes  Were Treatment Team discharge recommendations reviewed with patient/caregiver?: Yes  Did patient/caregiver verbalize understanding of patient care needs?: Yes       Contacts  Reason/Outcome: Discharge [address]         Is the patient interested in [facility] at discharge?: No    DME Referral Provided  Referral made for DME?: No

## 2022-09-20 NOTE — PHYSICAL THERAPY NOTE
PHYSICAL THERAPY EVALUATION          Patient Name: Nelson Suárez  Today's Date: 2022  PT EVALUATION    45 y o     [id number]    Hyponatremia [E87 1]  Altered mental status [R41 82]  ARF (acute renal failure) ([facility] [address] ) [N17 9]  Hypoglycemia [E16 2]  Transaminitis [R74 01]  Elevated liver enzymes [R74 8]  Positive D dimer [R79 89]  ANUJA (acute kidney injury) ([facility] [address] ) [N17 9]  Swelling of right foot [M79 89]    Past Medical History:   Diagnosis Date    Chronic pain disorder     Depression     GERD (gastroesophageal reflux disease)     History of electroconvulsive therapy     Low back pain     Self-injurious behavior     Suicide attempt [facility])      Past Surgical History:   Procedure Laterality Date     SECTION      COLONOSCOPY      PANCREAS SURGERY      "pseudocysts" per patient's  Ricki Infante    SD ESOPHAGOGASTRODUODENOSCOPY TRANSORAL DIAGNOSTIC N/A 4/10/2018    Procedure: EGD AND COLONOSCOPY;  Surgeon: Jordan Cutler MD;  [place] [facility]  GI LAB; Service: Gastroenterology        22 1008   PT Last Visit   PT Visit Date 22   Note Type   Note type Evaluation   Pain Assessment   Pain Assessment Tool FLACC   Pain Location/Orientation [place] Back   Hospital Pain Intervention(s) Repositioned; Ambulation/increased activity   Pain Rating: FLACC (Rest) - Face 1   Pain Rating: FLACC (Rest) - Legs 0   Pain Rating: FLACC (Rest) - Activity 1   Pain Rating: FLACC (Rest) - Cry 0   Pain Rating: FLACC (Rest) - Consolability 0   Score: FLACC (Rest) 2   Pain Rating: FLACC (Activity) - Face 1   Pain Rating: FLACC (Activity) - Legs 1   Pain Rating: FLACC (Activity) - Activity 1   Pain Rating: FLACC (Activity) - Cry 0   Pain Rating: FLACC (Activity) - Consolability 1   Score: FLACC (Activity) 4   Restrictions/Precautions   Other Precautions Cognitive; Chair Alarm; Bed Alarm;Multiple lines; Fall Risk;Pain   Home Living   Type of Home House   Additional Comments pt poor historian, unable to provide social hx at this time   Prior Function   Lives With [address] in the last 6 months 1 to 4   Comments pt poor historian, unable to provide reliable PLOF  denies use of DME to ambulate   General   Additional Pertinent History pt admitted 9/19/22 for acute encephalopathy  ambulate patient orders  Per OP neurosurgery note from 9/12, "normal gait, normal posture" and "5/5 strength throughout all muscle groups" however did report radiating LE pain w progressive sensory deficits  potential surgical candidate pending cessation of smoking  PMHx significant for chronic pain, depression, self injurious behavior/ suicide attempt   Cognition   Overall Cognitive Status Impaired   Arousal/Participation Responsive   Orientation Level Oriented to person;Oriented to place; Disoriented to time;Disoriented to situation  (general to place)   Memory Decreased recall of recent events;Decreased short term memory;Decreased recall of precautions   Following Commands Follows one step commands inconsistently   Comments inconsistently responds during session  at times will have eyes closed and not answer questions or commands  other times answers clearly and appropriately, no delay in response or command following  RLE Assessment   RLE Assessment X  (3- hip flexion, knee ext)   Strength RLE   R Ankle Dorsiflexion 0/5  (neutral DF via PROM- noted pain w PROM)   LLE Assessment   LLE Assessment WFL   Coordination   Sensation X  (baseline per chart  +swelling, warm to touch)   Bed Mobility   Supine to Sit 4  Minimal assistance   Additional items Assist x 1;HOB elevated; Bedrails; Increased time required;Verbal cues   Sit to Supine 4  Minimal assistance   Additional items Assist x 1; Increased time required;Verbal cues;LE management   Transfers   Sit to Stand 4  Minimal assistance   Additional items Assist x 1; Increased time required;Verbal cues   Stand to Sit 4  Minimal assistance   Additional items Assist x 1; Increased time required;Verbal cues;Other  (SW)   Ambulation/Elevation   Gait pattern Improper Weight shift; Wide SOY; Decreased foot clearance; Ataxia; Decreased heel strike; Excessively slow;R Hemiparesis;R Foot drag  (R foot drop, excessive hip movement to clear LE)   Gait Assistance 4  Minimal assist   Additional items Assist x 2;Verbal cues; Tactile cues   Assistive Device Standard walker   Distance 2' side step   Balance   Static Sitting Fair   Dynamic Sitting Fair -   Static Standing Fair -   Dynamic Standing Poor +   Ambulatory Poor +   Endurance Deficit   Endurance Deficit No  (vitals post amb: 127/74, 100s, 92% on RA)   Activity Tolerance   Activity Tolerance Treatment limited secondary to medical complications (Comment)  (cognition)   Medical Staff Made Aware Nima OT; CM; DO; ST   Nurse Made Aware Emeli Johnson RN   Assessment   Prognosis Guarded   Problem List Decreased strength; Impaired balance;Decreased mobility; Decreased cognition; Impaired judgement;Decreased safety awareness;Decreased coordination;Decreased skin integrity;Pain   Assessment Marino Pascual is a 45 y o  female admitted to [employer] on 9/19/2022 for Encephalopathy acute  PT was consulted and pt was seen on 9/20/2022 for mobility assessment and d/c planning  Pt presents w high fall risk, multiple lines, chronic back pain via FLACC  Unable to obtain social hx or PLOF given acute encephalopathy  Pt is currently functioning at a minimum assistance x1 level for bed mobility and transfers, min Ax2 for ambulation w walker  Pt demonstrated deficits of cognition impacting participation and safety, strength (acute RLE weakness w new foot drop?) impacting gait sequencing and fall risk, and balance also impacting fall risk  Require tactile input to facilitate wt shift and guide RLE during gait as well as to manage AD  Unable to ambulate household distances  At increased risk for recurrent falls due to prrev mentioned factors   Pt will benefit from continued skilled IP PT to address the above mentioned impairments  in order to maximize recovery and increase functional independence when completing mobility and ADLs  Currently PT recommendations for DME include RW if pt does not own  At this time PT recommendations for d/c are STR; will continue to evaluate d/c plan based on cognitive improvements, identification of social and environmental barriers if any  Barriers to Discharge Comments unable to identify at this time   Goals   Patient Goals none stated dt cognition   STG Expiration Date 10/04/22   Short Term Goal #1 1)  Pt will perform bed mobility with S demonstrating appropriate technique 100% of the time in order to improve function  2)  Perform all transfers with S demonstrating safe and appropriate technique 100% of the time in order to improve ability to negotiate safely in home environment  3) Amb with least restrictive AD > 50'x1 with S in order to demonstrate ability to negotiate in home environment  4)  Improve overall strength and balance 1/2 grade in order to optimize ability to perform functional tasks and reduce fall risk  5) Increase activity tolerance to 45 minutes in order to improve endurance to functional tasks  6)  Negotiate stairs using most appropriate technique and min A in order to be able to negotiate safely in home environment  7) PT for ongoing patient and family/caregiver education, DME needs and d/c planning in order to promote highest level of function in least restrictive environment  Plan   Treatment/Interventions Functional transfer training;LE strengthening/ROM; Elevations; Therapeutic exercise;Cognitive reorientation;Patient/family training;Equipment eval/education; Bed mobility;Gait training; Compensatory technique education;Continued evaluation;Spoke to nursing;Spoke to case management;Spoke to MD;OT;ST   PT Frequency Other (Comment)  (4-5x)   Recommendation   PT Discharge Recommendation Post acute rehabilitation services   Equipment Recommended [place]  (if patient does not own)   [facility] Recommended Wheeled walker   Additional Comments The patient's AM-PAC Basic Mobility Inpatient Short Form Raw Score is 15  A Raw score of less than or equal to 16 suggests the patient may benefit from discharge to post-acute rehabilitation services  Please also refer to the recommendation of the Physical Therapist for safe discharge planning  AM-PAC Basic Mobility Inpatient   Turning in Bed Without Bedrails 3   Lying on Back to Sitting on Edge of Flat Bed 3   Moving Bed to Chair 3   Standing Up From Chair 3   Walk in Room 2   Climb 3-5 Stairs 1   Basic Mobility Inpatient Raw Score 15   Basic Mobility Standardized Score 36 97   Highest Level Of Mobility   -[facility] Goal 4: Move to chair/commode   -[facility] Achieved 5: Stand (1 or more minutes)   End of Consult   Patient Position at End of Consult Supine;Bed/Chair alarm activated; All needs within reach   History: co - morbidities, fall risk, cognition, multiple lines  Exam: impairments in systems including musculoskeletal (strength), neuromuscular (balance, gait, transfers, motor function and sensation), am-pac, integumentary (skin integrity, presence of scars or wounds), cardiopulmonary, cognition  Clinical: unstable/unpredictable  Complexity:high      Dianne Company, PT

## 2022-09-20 NOTE — PROGRESS NOTES
NEPHROLOGY PROGRESS NOTE   Anton Pickett 45 y o  female MRN: [id number]  Unit/Bed#: [facility] 68 2 -01 Encounter: [id number]      HPI/24hr EVENTS:    -24-year-old female past medical history of chronic pain, depression, GERD  Presented with altered mental status and confusion via EMS, per HPI had a fall approximately 4 days ago and struck her head was not evaluated, but noted to be confused on date of admission  In the emergency department was found to have ANJUA transaminitis hyponatremia, hyperkalemia  Nephrology consulted for assistance in management    -GFR unchanged on IV fluids, minimal urine output documented overnight, bladder scan this morning greater than 300 mL, hyponatremia improving    ASSESSMENT/PLAN:    ANUJA (POA)  -Baseline creatinine: 0 7-1 0  -Creatinine on admission 5 88, most recent creatinine 6 73 (GFR now 6 from 7 on admission)  -Etiology:  Suspect due to pigment nephropathy/rhabdomyolysis  -UA:   large blood, no glucose, no ketones, trace protein, 0-1 RBCs  -Renal imaging:  CT abdomen pelvis upon admission-mild nonspecific bilateral perinephric fat stranding, no hydronephrosis or renal calculi  -Avoid hypotension, avoid nephrotoxins, avoid NSAIDS  -Trend BMP  -received 1 L normal saline on admission that started on isotonic saline 125 mL an hour  -noted to have urinary retention with bladder scan this morning greater than 300, place Rivera catheter for Critical intake and output monitoring  -continue IV fluids isotonic saline at 125 mL/hour for now, if urine output remains low might need to challenge with Lasix  -thankfully hyponatremia is correcting at the appropriate rate     Hyponatremia  -sodium 126 on admission, baseline appears normal, most recently 132, correction of 6 mEq over 24 hours which is appropriate  -suspect due to ANUJA/decreased ability to excrete free water, mid to be on Effexor as outpatient  -workup urine sodium 22, urine osmolality 208, a m   Cortisol 20, serum osmolality 291, given normal serum osmolality consider paraproteinemia workup  -monitor with volume repletion, currently isotonic saline at 125 mL an hour  -goal correction of no greater than 8 mEq over 24  -continue trending BMP     Rhabdomyolysis  -history of recent fall/period of immobility  -CK 32,634 on admission,  now 14,000   -trend urine output, continue IV fluids     Hyperkalemia  -potassium 5 4 on admission setting of ANUJA, most recently 4 7  -low-potassium diet when able to tolerate p o   -monitor with IV fluids  -repeat BMP now than q 8 hours call parameters     Anion gap acidosis  -anion gap 15, bicarbonate 24 on admission, most recently anion gap 14, serum bicarbonate 23  -lactic acid 0 4  -suspect due to ANUJA     Transaminitis  -AST greater than 1000, ALT elevated, alk-phos is normal, T bili normal  -suspect 2nd to rhabdomyolysis/elevated CK  -care per primary team  -CT abdomen pelvis with unremarkable liver     Encephalopathy  -CT head with no acute intracranial process  -care per primary   -TSH normal     Hypoglycemia  -glucose 56 on admission, with repeated episodes of hypoglycemia overnight  -care per primary     Elevated D-dimer  -D-dimer 3 84  -on heparin drip  -care per primary     Chronic pain   -on gabapentin 300 mg q i d   -would avoid that dose in the setting of ANUJA, max recommended dose 300 mg daily     Addition medical problems:  Chronic pain on gabapentin, depression, GERD    SUBJECTIVE:  Patient confused/no acute complaints    ROS:  Review of Systems   Unable to perform ROS: Mental status change        OBJECTIVE:  Current Weight: Weight - Scale: 62 6 kg (138 lb 0 1 oz)  Vitals:    09/19/22 1520 09/19/22 2151 09/20/22 0719 09/20/22 1000   BP: 133/80 126/74 126/73 127/74   BP [place] Left arm Right arm Right arm    Pulse: 102 105 102 (!) 110   Resp: 18 18 18    Temp: 98 2 °F (36 8 °C) 98 °F (36 7 °C) 97 5 °F (36 4 °C)    TempSrc: Oral Oral Oral    SpO2: 91% 92% 97% 90%   Weight:       Height: Intake/Output Summary (Last 24 hours) at 9/20/2022 1103  Last data filed at 9/20/2022 0516  Gross per 24 hour   Intake 4140  85 ml   Output 400 ml   Net 3740  85 ml     Physical Exam  Vitals and nursing note reviewed  Constitutional:       General: She is not in acute distress  Appearance: Normal appearance  She is not toxic-appearing or diaphoretic  Comments: Awake sitting in bed eating breakfast   HENT:      Head: Normocephalic  Comments: Abrasions on face in various stages of healing     Nose: Nose normal       Mouth/Throat:      Mouth: Mucous membranes are dry  Eyes:      General: No scleral icterus  Cardiovascular:      Rate and Rhythm: Normal rate and regular rhythm  Pulses: Normal pulses  Heart sounds: Normal heart sounds  Pulmonary:      Effort: Pulmonary effort is normal  No respiratory distress  Breath sounds: Normal breath sounds  No wheezing  Abdominal:      General: Abdomen is flat  There is no distension  Palpations: Abdomen is soft  Tenderness: There is no abdominal tenderness  Musculoskeletal:      Cervical back: Neck supple  Right lower leg: No edema  Left lower leg: No edema  Skin:     General: Skin is warm and dry  Capillary Refill: Capillary refill takes less than 2 seconds  Neurological:      Mental Status: She is alert  She is confused  GCS: GCS eye subscore is 4  GCS verbal subscore is 4  GCS motor subscore is 6            Medications:    Current Facility-Administered Medications:   •  acetaminophen (TYLENOL) tablet 650 mg, 650 mg, Oral, Q6H PRN, Atul Adan DO  •  heparin (porcine) 25,000 units in 0 45% NaCl 250 mL infusion (premix), 3-30 Units/kg/hr (Order-Specific), Intravenous, Titrated, Rajwinder Hall DO, Last Rate: 10 8 mL/hr at 09/20/22 0855, 18 Units/kg/hr at 09/20/22 0855  •  heparin (porcine) injection 2,400 Units, 2,400 Units, Intravenous, Q1H PRN, Tony Olivia DO, 2,400 Units at 09/20/22 0856  •  heparin (porcine) injection 4,800 Units, 4,800 Units, Intravenous, Q1H PRN, Rajwinder Hall DO  •  nicotine (NICODERM CQ) 14 mg/24hr TD 24 hr patch 1 patch, 1 patch, Transdermal, Daily, Atul Adan DO, 1 patch at 09/20/22 0855  •  ondansetron (ZOFRAN) injection 4 mg, 4 mg, Intravenous, Q4H PRN, Yaquelin Wells DO  •  QUEtiapine (SEROquel) tablet 25 mg, 25 mg, Oral, HS, Atul Adan DO, 25 mg at 09/19/22 2108  •  sodium chloride 0 9 % infusion, 125 mL/hr, Intravenous, Continuous, CARLITOS Jordan, Last Rate: 125 mL/hr at 09/20/22 0516, 125 mL/hr at 09/20/22 0516  •  venlafaxine (EFFEXOR-XR) 24 hr capsule 150 mg, 150 mg, Oral, Daily, Yaquelin Wells DO, 150 mg at 09/20/22 0855    Laboratory Results:  Results from last 7 days   Lab Units 09/20/22  0909 09/19/22  2350 09/19/22  1708 09/19/22  1222 09/19/22  0829   WBC Thousand/uL  --   --   --   --  9 38   HEMOGLOBIN g/dL  --   --   --   --  12 6   HEMATOCRIT %  --   --   --   --  38 4   PLATELETS Thousands/uL  --   --   --   --  397*   POTASSIUM mmol/L 4 7 4 9 5 0 4 7 5 4*   CHLORIDE mmol/L 95* 94* 93* 89* 87*   CO2 mmol/L 23 23 26 24 24   BUN mg/dL 55* 53* 54* 53* 53*   CREATININE mg/dL 6 73* 6 21* 6 03* 5 82* 5 88*   CALCIUM mg/dL 7 7* 7 5* 7 7* 7 5* 7 8*       I have personally reviewed the blood work as stated above and in my note  I have personally reviewed CT abdomen pelvis imaging studies  I have personally reviewed had GI Internal Medicine note

## 2022-09-20 NOTE — ASSESSMENT & PLAN NOTE
· Acute encephalopathy appears secondary to gabapentin and morphine with subsequent renal failure  · Also had hypoglycemia requiring D50  No further hypoglycemia  · Continue holding gabapentin and morphine for now  Restart when appropriate

## 2022-09-20 NOTE — ASSESSMENT & PLAN NOTE
· Left-sided opacities noted  COVID negative    · Procalcitonin only minimally elevated doubt active infection    Results from last 7 days   Lab Units 09/20/22  0606   PROCALCITONIN ng/ml 0 31*

## 2022-09-20 NOTE — CASE MANAGEMENT
Case Management Assessment & Discharge Planning Note    Patient name Bren Hammond  Location [place] [address] [place]* MRN [id number]  : 1970 Date 2022       Current Admission Date: 2022  Current Admission Diagnosis:Encephalopathy acute   Patient Active Problem List    Diagnosis Date Noted   • ARF (acute renal failure) ([facility] [address] ) 2022   • Transaminitis 2022   • Encephalopathy acute 2022   • Abnormal CT of the chest 2022   • Traumatic rhabdomyolysis ([facility] [address] ) 2022   • Hyperglycemia 2022   • Opioid dependence ([facility] [address] ) 2021   • Chronic right shoulder pain 2021   • Internal hemorrhoids 2020   • Adnexal cyst 2020   • Ischemic colitis ([facility] [address] ) 2020   • GERD (gastroesophageal reflux disease)    • Intercostal neuralgia    • Hematochezia 2019   • Insomnia 2019   • History of rib fracture 10/28/2018   • Tobacco abuse 10/28/2018   • Right knee pain 2018   • Generalized anxiety disorder 2018   • Hypertension    • Hyperlipidemia    • Depression    • COPD (chronic obstructive pulmonary disease) ([facility] [address] )    • Chondromalacia patellae 2018   • Irritable bowel syndrome with diarrhea 2018   • DDD (degenerative disc disease), lumbosacral 2014      LOS (days): 1  Geometric Mean LOS (GMLOS) (days):   Days to GMLOS:     OBJECTIVE:    Risk of Unplanned Readmission Score: 21 08         Current admission status: Inpatient       Preferred Pharmacy:   [address] [place] [address]  Phone: [phone] Fax: [phone] - [address] [place] [address]  Phone: [phone] Fax: [phone]    Primary Care Provider: Willow Rain MD    Primary Insurance: BLUE CROSS  Secondary Insurance:     ASSESSMENT:  [address] [facility],  Silviano Baldwin Representative - Spouse   Primary Phone: [phone] [facility])  Home Phone: [phone]  Work Phone: [phone]                         Readmission Root Cause  30 Day Readmission: No    Patient Information  Admitted from[de-identified] Home  Mental Status: Confused  Assessment information provided by[de-identified] Spouse  Primary Caregiver: Self  Support Systems: Spouse/significant other, Daughter  [place] of Residence: [address] [place] [address] do you live in?: [place] entry access options   Select all that apply : Stairs  Number of steps to enter home : 4  Do the steps have railings?: No  Type of Current Residence: 2 story home  Upon entering residence, is there a bedroom on the main floor (no further steps)?: No  A bedroom is located on the following floor levels of residence (select all that apply):: 2nd Floor  Upon entering residence, is there a bathroom on the main floor (no further steps)?: No  Indicate which floors of current residence have a bathroom (select all the apply):: 2nd Floor  Number of steps to 2nd floor from main floor: One Flight  In the last 12 months, was there a time when you were not able to pay the mortgage or rent on time?: No  In the last 12 months, how many places have you lived?: 1  In the last 12 months, was there a time when you did not have a steady place to sleep or slept in a shelter (including now)?: No  Homeless/housing insecurity resource given?: N/A  Living Arrangements: Lives w/ Spouse/significant other  Is patient a ?: No    Activities of Daily Living Prior to Admission  Functional Status: Independent  Completes ADLs independently?: Yes  Ambulates independently?: Yes  Does patient use assisted devices?: No  Does patient currently own DME?: No  Does patient have a history of Outpatient Therapy (PT/OT)?: Yes ([facility])  Does the patient have a history of Short-Term Rehab?: Yes ([facility])  Does patient have a history of HHC?: No  Does patient currently have [place] ?: No         Patient Information Continued  Within the past 12 months, you worried that your food would run out before you got the money to buy more : Never true  Within the past 12 months, the food you bought just didn't last and you didn't have money to get more : Never true  Food insecurity resource given?: N/A  Does patient receive dialysis treatments?: No  Does patient have a history of substance abuse?:  (1 PACK/WEEK CIGARETTES  Occasional alcohol   [facility] )  Does patient have a history of Mental Health Diagnosis?: Yes (DEPRESSION)         Means of Transportation  Means of Transport to Appts[de-identified] Drives Self  In the past 12 months, has lack of transportation kept you from medical appointments or from getting medications?: No  In the past 12 months, has lack of transportation kept you from meetings, work, or from getting things needed for daily living?: No  Was application for public transport provided?: N/A

## 2022-09-20 NOTE — UTILIZATION REVIEW
Initial Clinical Review    Admission: Date/Time/Statement:   Admission Orders (From admission, onward)     Ordered        09/19/22 1050  INPATIENT ADMISSION  Once                      Orders Placed This Encounter   Procedures   • INPATIENT ADMISSION     Standing Status:   Standing     Number of Occurrences:   1     Order Specific Question:   Level of Care     Answer:   Med Surg [16]     Order Specific Question:   Estimated length of stay     Answer:   More than 2 Midnights     Order Specific Question:   Certification     Answer:   I certify that inpatient services are medically necessary for this patient for a duration of greater than two midnights  See H&P and MD Progress Notes for additional information about the patient's course of treatment  ED Arrival Information     Expected   -    Arrival   9/19/2022 08:04    Acuity   Emergent            Means of arrival   Ambulance    Escorted by   [facility] ([address])    Service   Hospitalist    Admission type   Emergency            Arrival complaint   Altered Mental Status           Chief Complaint   Patient presents with   • Altered Mental Status     Pt arrives via ems from home  called ems because pt is is confused this morning  Pt is alert to self and place  But can not answer any other questions  Pt did have a fall 4 days ago  Pt denies drugs and alcohol use Pt denies cp and sob        Initial Presentation: 45 y o  female presents to the ED via EMS from home with c/o confusion, lethargy and decreased oral intake  She had a fall with headstrike 4 days PTA and was fine afterward  Also c/o R ankle pain which is swollen  PMH: IBS, HTN, HLD, depression, COPD, anxiety, GERD, opioid abuse  Lumbar radiculopathy with plan for surgery in October  In the ED she was found to have elevated D dimer, CK > 32,000, creat 5 88, procal, anion gap, LFTs  She has low Na, calcium, negative delta troponin  UDS + for opioids - uses home meds, no illicit drugs  She was treated with IV fluids, IV drip started  Imaging shows Bilateral groundglass opacities with superimposed inter and intralobular septal thickening  Differential diagnosis includes pulmonary hemorrhage, infection, and pulmonary edema, although the distribution is atypical for edema  On exam she is disoriented but alert, has swelling, tenderness R calf, abrasions on chin and R knee  She is admitted to INPATIENT status with Acute Encephlopathy - poss d/t gabapentin and Morphine - hold both for now - restart when appropriate  ARF likely d/t rhabdomyolysis - no renal obstruction - nephrology consult, IV fluids  Transaminitis - likely d/t rhabdo - monitor  Traumatic Rhabdo - duplex to r/o DVT, heparin infusion  Abnormal CT chest - covid negative, follow procal, can't r/o infiltrate  9/19 Nephrology Consult - post fall now with acute renal failure, abnormal LFTs, hyponatremia, hyperkalemic - severe ANUJA with creat 5 88 - likely pre-renal  - continue IV fluids, monitor I&O, avoid nephrotoxins and hypotension  Hyponatremia - iv fluids, trend labs, avoid over-correction  HyperKalemia - low K diet and trend labs  AMS change and chronic pain - decrease Gabapentin to renal dosing  Anion gap acidosis - IV fluids  transaminitis - trend  9/19 GI Consult - elevated LFT likely d/t rhabdo from fall  Will check viral panel and liver doppler, IV fluids continues, trend labs  She is A&O on exam       Date: 9/20   Day 2:   ANUJA with increasing creat 6 73 - GFR unchanged on IV fluids, minimal urine output documented overnight, bladder scan this morning greater than 300 mL, hyponatremia improving  Rivera placed, continue IV Fluids and if UO remains low will do IV Lasix challenge  Hyponatremia is correcting at appropriate rate  CK trending down, continue IV Fluids NSS  Transaminitis with normal liver on imaging  On exam she remains confused today  GCS 14  hypoglycemia resolved  Waiting renal recovery    On exam is awake but still confused  C/o R leg pains  ED Triage Vitals   Temperature Pulse Respirations Blood Pressure SpO2   09/19/22 0810 09/19/22 0810 09/19/22 0810 09/19/22 0810 09/19/22 0810   98 5 °F (36 9 °C) (!) 109 18 155/72 96 %      Temp Source Heart Rate Source Patient Position - Orthostatic VS BP Location FiO2 (%)   09/19/22 0810 09/19/22 1051 09/19/22 0810 09/19/22 0810 --   Oral Monitor Sitting Right arm       Pain Score       09/19/22 1308       No Pain          Wt Readings from Last 1 Encounters:   09/19/22 62 6 kg (138 lb 0 1 oz)     Additional Vital Signs:   09/20/22 10:00:13 -- 110 Abnormal  -- 127/74 92 90 % -- -- -- --   09/20/22 0800 -- -- -- -- -- -- -- -- None (Room air) --   09/20/22 07:19:49 97 5 °F (36 4 °C) 102 18 126/73 91 97 % -- -- Nasal cannula Lying   09/20/22 0100 -- -- -- -- -- -- 28 2 L/min Nasal cannula --   09/19/22 21:51:26 98 °F (36 7 °C) 105 18 126/74 91 92 % -- -- None (Room air) Lying   09/19/22 2000 -- -- -- -- -- -- -- -- None (Room air) --   09/19/22 15:20:10 98 2 °F (36 8 °C) 102 18 133/80 98 91 % -- -- None (Room air) Lying   09/19/22 1308 97 8 °F (36 6 °C) 100 16 159/88 -- -- -- -- -- --   09/19/22 13:04:39 97 8 °F (36 6 °C) 100 18 159/88 112 93 % -- -- None (Room air) Lying   09/19/22 1236 -- 102 14 153/79 -- 95 % -- -- None (Room air) Lying   09/19/22 1051 -- 102 16 143/82 -- 93 % -- -- None (Room air) Lying     Pertinent Labs/Diagnostic Test Results:     9/19 ECG - ST    VAS lower limb venous duplex study, unilateral/limited   Final Result by Marlee Medel MD (09/19 2201)   RLE no evidence of thrombus  LLE - unable to complete d/t pt cooperation  CT lower extremity wo contrast right   Final Result by Jeff Flores MD (09/19 5277)      There is no evidence of intramuscular hematoma or other mass lesion in the right calf    Please note that rhabdomyolysis and compartment syndrome are clinical diagnoses, and are not excluded based on these imaging findings  Workstation performed: [id number]         CT chest abdomen pelvis wo contrast   Final Result by Meghan Chisholm MD (09/19 1301)      Bilateral groundglass opacities with superimposed inter and intralobular septal thickening  Differential diagnosis includes pulmonary hemorrhage, infection, and pulmonary edema, although the distribution is atypical for edema  XR ankle 3+ views RIGHT   Final Result by Lucretia Riedel, MD (09/19 1009)      No acute osseous abnormality  Workstation performed: [id number]         XR foot 3+ views RIGHT   Final Result by Lucretia Riedel, MD (09/19 1009)      No acute osseous abnormality  XR hip/pelv 2-3 vws right   Final Result by Lucretia Riedel, MD (09/19 1007)      No acute osseous abnormality  XR chest 1 view   Final Result by Lucretia Riedel, MD (09/19 1008)      No acute cardiopulmonary disease  CT head without contrast   Final Result by Demetris Payne MD (09/19 1590)      No acute intracranial process  No skull fracture  CT spine cervical without contrast   Final Result by Demetris Payne MD (09/19 5104)      No cervical spine fracture or traumatic malalignment  Incidental finding of partially visualized subpleural groundglass opacity in the left pulmonary apex presumably scarring  Cannot exclude infiltrate  US right upper quadrant with liver dopplers    (Results Pending)   1    Minimal layering sludge without evidence of acute cholecystitis    2   Normal-appearing liver with normal liver Dopplers     Results from last 7 days   Lab Units 09/19/22  1401   SARS-COV-2  Negative     Results from last 7 days   Lab Units 09/19/22  0829   WBC Thousand/uL 9 38   HEMOGLOBIN g/dL 12 6   HEMATOCRIT % 38 4   PLATELETS Thousands/uL 397*   NEUTROS ABS Thousands/µL 7 99*         Results from last 7 days   Lab Units 09/20/22  0909 09/19/22  2350 09/19/22  1708 09/19/22  1222 09/19/22  0829   SODIUM mmol/L 132* 129* 128* 127* 126*   POTASSIUM mmol/L 4 7 4 9 5 0 4 7 5 4*   CHLORIDE mmol/L 95* 94* 93* 89* 87*   CO2 mmol/L 23 23 26 24 24   ANION GAP mmol/L 14* 12 9 14* 15*   BUN mg/dL 55* 53* 54* 53* 53*   CREATININE mg/dL 6 73* 6 21* 6 03* 5 82* 5 88*   EGFR ml/min/1 73sq m 6 7 7 7 7   CALCIUM mg/dL 7 7* 7 5* 7 7* 7 5* 7 8*     Results from last 7 days   Lab Units 09/20/22  0606 09/19/22  0829   AST U/L 970* 1,683*   ALT U/L 512* 674*   ALK PHOS U/L 79 98   TOTAL PROTEIN g/dL 5 4* 5 7*   ALBUMIN g/dL 1 8* 2 3*   TOTAL BILIRUBIN mg/dL 0 35 0 58   BILIRUBIN DIRECT mg/dL 0 15  --    AMMONIA umol/L  --  33         Results from last 7 days   Lab Units 09/20/22  0909 09/19/22  2350 09/19/22  1708 09/19/22  1222 09/19/22  0829   GLUCOSE RANDOM mg/dL 88 62* 59* 88 56*     Results from last 7 days   Lab Units 09/20/22  0606   OSMOLALITY, SERUM mmol/         No results found for: BETA-HYDROXYBUTYRATE       Results from last 7 days   Lab Units 09/19/22  1220   PH CHARLES  7 278*   PCO2 CHARLES mm Hg 51 0*   PO2 CHARLES mm Hg 34 8*   HCO3 CHARLES mmol/L 23 3*   BASE EXC CHARLES mmol/L -3 9   O2 CONTENT CHARLES ml/dL 11 4   O2 HGB, VENOUS % 61 5         Results from last 7 days   Lab Units 09/20/22  0606 09/19/22  0829   CK TOTAL U/L 14,147* 32,634*   CK MB INDEX % 1 2 1 1   CK MB ng/mL 166 7* 371 5*     Results from last 7 days   Lab Units 09/19/22  1220 09/19/22  1047 09/19/22  0829   HS TNI 0HR ng/L  --   --  80*   HS TNI 2HR ng/L  --  73*  --    HSTNI D2 ng/L  --  -7  --    HS TNI 4HR ng/L 84*  --   --    HSTNI D4 ng/L 4  --   --      Results from last 7 days   Lab Units 09/19/22  0829   D-DIMER QUANTITATIVE ug/ml FEU 3 84*     Results from last 7 days   Lab Units 09/20/22  0606 09/19/22  2350 09/19/22  1708 09/19/22  1047   PROTIME seconds  --   --   --  14 4   INR   --   --   --  1 11   PTT seconds 57* 67* 52* 27     Results from last 7 days   Lab Units 09/19/22  0829   TSH 3RD GENERATON uIU/mL 3 047     Results from last 7 days   Lab Units 09/20/22  0606   PROCALCITONIN ng/ml 0 31*     Results from last 7 days   Lab Units 09/19/22  1222   LACTIC ACID mmol/L 0 4*     Results from last 7 days   Lab Units 09/20/22  0606 09/19/22  1222   OSMOLALITY, SERUM mmol/  --    OSMO UR mmol/KG  --  208*     Results from last 7 days   Lab Units 09/19/22  1223 09/19/22  1222   CLARITY UA  Slightly Cloudy  --    COLOR UA  Yellow  --    SPEC GRAV UA  1 020  --    PH UA  5 5  --    GLUCOSE UA mg/dl Negative  --    KETONES UA mg/dl Negative  --    BLOOD UA  Large*  --    PROTEIN UA mg/dl Trace*  --    NITRITE UA  Negative  --    BILIRUBIN UA  Small*  --    UROBILINOGEN UA E U /dl 0 2  --    LEUKOCYTES UA  Negative  --    WBC UA /hpf None Seen  --    RBC UA /hpf 0-1*  --    BACTERIA UA /hpf None Seen  --    EPITHELIAL CELLS WET PREP /hpf Occasional  --    SODIUM UR   --  22     Results from last 7 days   Lab Units 09/19/22  1401   INFLUENZA A PCR  Negative   INFLUENZA B PCR  Negative   RSV PCR  Negative         Results from last 7 days   Lab Units 09/19/22  1222   AMPH/METH  Negative   BARBITURATE UR  Negative   BENZODIAZEPINE UR  Negative   COCAINE UR  Negative   METHADONE URINE  Negative   OPIATE UR  Positive*   PCP UR  Negative   THC UR  Negative     Results from last 7 days   Lab Units 09/19/22  0829   ETHANOL LVL mg/dL <3   ACETAMINOPHEN LVL ug/mL <2*   SALICYLATE LVL mg/dL 4 7     ED Treatment:   Medication Administration from 09/19/2022 0803 to 09/19/2022 1259       Date/Time Order Dose Route Action     09/19/2022 1234 sodium chloride 0 9 % bolus 1,000 mL 0 mL Intravenous Stopped     09/19/2022 1017 sodium chloride 0 9 % bolus 1,000 mL 1,000 mL Intravenous New Bag     09/19/2022 1056 heparin (VTE/PE) high 0 mL Intravenous Hold     09/19/2022 1045 dextrose 50 % IV solution 25 mL 25 mL Intravenous Given     09/19/2022 1049 heparin (porcine) injection 4,800 Units 4,800 Units Intravenous Given     09/19/2022 1053 heparin (porcine) 25,000 units in 0 45% NaCl 250 mL infusion (premix) 18 Units/kg/hr Intravenous New Bag        Past Medical History:   Diagnosis Date   • Chronic pain disorder    • Depression    • GERD (gastroesophageal reflux disease)    • History of electroconvulsive therapy    • Low back pain    • Self-injurious behavior    • Suicide attempt ([email or web address] )      Present on Admission:  • Depression  • GERD (gastroesophageal reflux disease)  • Tobacco abuse  • DDD (degenerative disc disease), lumbosacral  • ARF (acute renal failure) ([facility])  • Transaminitis  • Encephalopathy acute  • Abnormal CT of the chest  • Traumatic rhabdomyolysis ([facility])      Admitting Diagnosis: Hyponatremia [E87 1]  Altered mental status [R41 82]  ARF (acute renal failure) ([facility]) [N17 9]  Hypoglycemia [E16 2]  Transaminitis [R74 01]  Elevated liver enzymes [R74 8]  Positive D dimer [R79 89]  ANUJA (acute kidney injury) ([email or web address] ) [N17 9]  Swelling of right foot [M79 89]  Age/Sex: 45 y o  female  Admission Orders:  Scheduled Medications:  nicotine, 1 patch, Transdermal, Daily  QUEtiapine, 25 mg, Oral, HS  venlafaxine, 150 mg, Oral, Daily      Continuous IV Infusions:  heparin (porcine), 3-30 Units/kg/hr (Order-Specific), Intravenous, Titrated  sodium chloride, 125 mL/hr, Intravenous, Continuous      PRN Meds:  acetaminophen, 650 mg, Oral, Q6H PRN  heparin (porcine), 2,400 Units, Intravenous, Q1H PRN - x 1 9/19, 9/20  heparin (porcine), 4,800 Units, Intravenous, Q1H PRN  ondansetron, 4 mg, Intravenous, Q4H PRN    Heparin drip  Bladder scan x 1  Monitor for urinary retention  Ambulate QID  Insert abel  US RUQ  Acute hep panel, viral labs  BMP q 8 hr   Renal diet  IP CONSULT TO NEPHROLOGY  IP CONSULT TO GASTROENTEROLOGY    Network Utilization Review Department  ATTENTION: Please call with any questions or concerns to [phone] and carefully listen to the prompts so that you are directed to the right person   All voicemails are confidential   Chrissie Chung all requests for admission clinical reviews, approved or denied determinations and any other requests to dedicated fax number below belonging to the campus where the patient is receiving treatment   List of dedicated fax numbers for the Facilities:  [address] [place] [address] DENIALS (Administrative/Medical Necessity) [phone]   [address] (Maternity/NICU/Pediatrics) [phone] [address] [phone]   [facility]  [phone]   [address] [facility] [address] [facility] [place] [address] [address] [facility] [phone]   [address] [facility] [address] [phone]

## 2022-09-20 NOTE — ASSESSMENT & PLAN NOTE
· Secondary to fall on Friday  IV fluids per Nephrology  · Venous duplex negative for DVT    Elevated D-dimer continue empiric heparin infusion for now    Results from last 7 days   Lab Units 09/20/22  0606 09/19/22  0829   CK TOTAL U/L 14,147* 32,634*

## 2022-09-20 NOTE — ASSESSMENT & PLAN NOTE
· Secondary to rhabdomyolysis  No evidence of renal obstruction on imaging  · Appreciate nephrology evaluation  Awaiting renal recovery  Continue IV fluids      Results from last 7 days   Lab Units 09/20/22  1526 09/20/22  0909 09/19/22  2350 09/19/22  1708 09/19/22  1222 09/19/22  0829   BUN mg/dL 54* 55* 53* 54* 53* 53*   CREATININE mg/dL 6 64* 6 73* 6 21* 6 03* 5 82* 5 88*   EGFR ml/min/1 73sq m 6 6 7 7 7 7

## 2022-09-20 NOTE — PLAN OF CARE
Problem: PHYSICAL THERAPY ADULT  Goal: Performs mobility at highest level of function for planned discharge setting  See evaluation for individualized goals  Description: Treatment/Interventions: Functional transfer training, LE strengthening/ROM, Elevations, Therapeutic exercise, Cognitive reorientation, Patient/family training, Equipment eval/education, Bed mobility, Gait training, Compensatory technique education, Continued evaluation, Spoke to nursing, Spoke to case management, Spoke to MD, OT, ST  Equipment Recommended: Chani Ventura (if patient does not own)       See flowsheet documentation for full assessment, interventions and recommendations  9/20/2022 1216 by Cash Grover PT  Note: Prognosis: Guarded  Problem List: Decreased strength, Impaired balance, Decreased mobility, Decreased cognition, Impaired judgement, Decreased safety awareness, Decreased coordination, Decreased skin integrity, Pain  Assessment: Devin Zhang is a 45 y o  female admitted to [facility] on 9/19/2022 for Encephalopathy acute  PT was consulted and pt was seen on 9/20/2022 for mobility assessment and d/c planning  Pt presents w high fall risk, multiple lines, chronic back pain via FLACC  Unable to obtain social hx or PLOF given acute encephalopathy  Pt is currently functioning at a minimum assistance x1 level for bed mobility and transfers, min Ax2 for ambulation w walker  Pt demonstrated deficits of cognition impacting participation and safety, strength (acute RLE weakness w new foot drop?) impacting gait sequencing and fall risk, and balance also impacting fall risk  Require tactile input to facilitate wt shift and guide RLE during gait as well as to manage AD  Unable to ambulate household distances  At increased risk for recurrent falls due to prrev mentioned factors   Pt will benefit from continued skilled IP PT to address the above mentioned impairments  in order to maximize recovery and increase functional independence when completing mobility and ADLs  Currently PT recommendations for DME include RW if pt does not own  At this time PT recommendations for d/c are STR; will continue to evaluate d/c plan based on cognitive improvements, identification of social and environmental barriers if any  Barriers to Discharge Comments: unable to identify at this time  PT Discharge Recommendation: Post acute rehabilitation services    See flowsheet documentation for full assessment  9/20/2022 1215 by Micha Marcelino PT  Note: Prognosis: Guarded  Problem List: Decreased strength, Impaired balance, Decreased mobility, Decreased cognition, Impaired judgement, Decreased safety awareness, Decreased coordination, Decreased skin integrity, Pain  Assessment: Anton Pickett is a 45 y o  female admitted to [facility] on 9/19/2022 for Encephalopathy acute  PT was consulted and pt was seen on 9/20/2022 for mobility assessment and d/c planning  Pt presents w high fall risk, multiple lines, chronic back pain via FLACC  Unable to obtain social hx or PLOF given acute encephalopathy  Pt is currently functioning at a minimum assistance x1 level for bed mobility and transfers, min Ax2 for ambulation w walker  Pt demonstrated deficits of cognition impacting participation and safety, strength (acute RLE weakness w new foot drop?) impacting gait sequencing and fall risk, and balance also impacting fall risk  Require tactile input to facilitate wt shift and guide RLE during gait as well as to manage AD  Unable to ambulate household distances  At increased risk for recurrent falls due to prrev mentioned factors  Pt will benefit from continued skilled IP PT to address the above mentioned impairments  in order to maximize recovery and increase functional independence when completing mobility and ADLs  Currently PT recommendations for DME include RW if pt does not own   At this time PT recommendations for d/c are STR; will continue to evaluate d/c plan based on cognitive improvements, identification of social and environmental barriers if any  Barriers to Discharge Comments: unable to identify at this time  PT Discharge Recommendation: Post acute rehabilitation services    See flowsheet documentation for full assessment

## 2022-09-20 NOTE — ASSESSMENT & PLAN NOTE
· Secondary to rhabdomyolysis    No evidence of liver injury on CT imaging  · GI following    Results from last 7 days   Lab Units 09/20/22  0606 09/19/22  0829   AST U/L 970* 1,683*   ALT U/L 512* 674*   TOTAL BILIRUBIN mg/dL 0 35 0 58

## 2022-09-20 NOTE — APP STUDENT NOTE
MARE STUDENT  Inpatient Progress Note for TRAINING ONLY  Not Part of Legal Medical Record     Progress Note - Isra Perez 45 y o  female MRN: [id number]  Unit/Bed#: [place] [address] -01 Encounter: [id number]      Assessment/Plan:  1  Encephalopathy, acute  · Appears to be secondary to gabapentin and morphine with renal failure  · Hypoglycemic episode at admission requiring D50  · Hold both gabapentin and morphine  · Cortisol normal   · HSV, CMV, EBV panels pending  2  Traumatic rhabdomyolysis  · Following fall on Friday and sedentary lifestyle this weekend  · Swollen right lower extremity  · Venous duplex shows no evidence of DVT  · CT lower extremity shows no evidence of intramuscular hematoma or other mass lesion in the right calf  Does not exclude rhabdomyolysis and compartment syndrome  · D-dimer elavated, continue empiric heparin infusion  · CK decreasing from 32,634 to 14,147  Continue IV fluids and trending CK  3  Transaminitis  · Secondary to rhabdomyolysis  · No evidence of liver injury on CT abdomen wo contrast    · AST::027 decreasing  · Low total protein 5 4, low albumin 1 8  Bilirubin, alkaline phospatase normal    4  Acute renal failure  · Secondary to rhabdomyolysis  No evidence of renal obstruction on imaging  · Continue IV fluids  · Nephrology consulted  · Bun: Cr increased to 55:6 73 from 53:6 21  5  Abnormal CT of chest  · Opacities noted in left lung  · Procalcitonin elevated at 0 31  Consider elevation due to decreased organ perfusion  · Negative for COVID, influenza, RSV  6  GERD  · Hold PPI until renal injury resolved  7  Degenerative disc disease, lumbosacral  · Lumbar radiculopathy, surgery coordinated  · Prior to admission on gabapentin, morphine IR 15mg  Hold for kidney injury  · Confirmed on PDMP  8  Tobacco abuse  · Nicotine patch while inpatient     9  Depression  · Continue venlafaxine (Effexor) and quetiapine (Seroquel)    VTE Pharmacologic Prophylaxis:   Pharmacologic: Heparin Drip  Mechanical VTE Prophylaxis in Place: No    Patient Centered Rounds: I have performed bedside rounds with nursing staff today  Discussions with Specialists or Other Care Team Provider: Discussed plan of care with Dr Tejas Jacques DO    Time Spent for Care: 30 minutes  More than 50% of total time spent on counseling and coordination of care as described above  Current Length of Stay: 1 day(s)    Current Patient Status: Inpatient   Certification Statement: The patient will continue to require additional inpatient hospital stay due to metabolic encephalopathy    Discharge Plan: Greater than 48 hours    Code Status: Level 1 - Full Code    Subjective:   Lisa Ren is a 48yo female with PMH of depression, degenerative disc disease, on hospital day #1 after admission for metabolic encephalopathy following a non-witnessed , and extreme lethargy over the weekend with reduced movement  Patient complains of worsening right leg pain  No overnight events  Patient is oriented to only person and place, disoriented to time and reason for stay  Patient reports she is "doing better", and is repetitive with her responses  Is cooperative and follows commands but is slow with completing  Patient had breakfast available, nurse reports patient is reluctant to eat  Objective:     Vitals:   Temp (24hrs), Av 9 °F (36 6 °C), Min:97 5 °F (36 4 °C), Max:98 2 °F (36 8 °C)    Temp:  [97 5 °F (36 4 °C)-98 2 °F (36 8 °C)] 97 5 °F (36 4 °C)  HR:  [100-105] 102  Resp:  [14-18] 18  BP: (126-159)/(73-88) 126/73  SpO2:  [91 %-97 %] 97 %  Body mass index is 22 28 kg/m²  Input and Output Summary (last 24 hours): Intake/Output Summary (Last 24 hours) at 2022 0940  Last data filed at 2022 0516  Gross per 24 hour   Intake 4140  85 ml   Output 400 ml   Net 3740  85 ml       Physical Exam:     Physical Exam  Constitutional:       General: She is not in acute distress  Appearance: She is normal weight  HENT:      Head: Normocephalic and atraumatic  Cardiovascular:      Rate and Rhythm: Normal rate and regular rhythm  Pulses: Normal pulses  Heart sounds: Normal heart sounds  No murmur heard  No friction rub  No gallop  Pulmonary:      Effort: Pulmonary effort is normal  No respiratory distress  Breath sounds: Normal breath sounds  No wheezing, rhonchi or rales  Abdominal:      General: Abdomen is flat  There is no distension  Palpations: Abdomen is soft  Tenderness: There is no abdominal tenderness  There is no guarding  Musculoskeletal:         General: Swelling and tenderness present  Right lower leg: Edema present  Skin:     General: Skin is warm and dry  Neurological:      Mental Status: She is alert  Psychiatric:         Attention and Perception: She is inattentive  Mood and Affect: Affect is blunt and flat  Speech: Speech is delayed  Behavior: Behavior is slowed  Behavior is cooperative  Cognition and Memory: Cognition is impaired  Memory is impaired  Comments: Oriented to person and place, disoriented to time and reason for hospitalization  Historical Information   Past Medical History:   Diagnosis Date   • Chronic pain disorder    • Depression    • GERD (gastroesophageal reflux disease)    • History of electroconvulsive therapy    • Low back pain    • Self-injurious behavior    • Suicide attempt [facility])      Past Surgical History:   Procedure Laterality Date   •  SECTION     • COLONOSCOPY     • PANCREAS SURGERY      "pseudocysts" per patient's  Ricki Infante   • [place] ESOPHAGOGASTRODUODENOSCOPY TRANSORAL DIAGNOSTIC N/A 4/10/2018    Procedure: EGD AND COLONOSCOPY;  Surgeon: Tayler Snider MD;  [place] [facility];   Service: Gastroenterology     Social History   Social History     Substance and Sexual Activity   Alcohol Use Yes    Comment: "occasional glass of wine" Social History     Substance and Sexual Activity   Drug Use Not Currently   • Types: Marijuana, Cocaine    Comment: medical     Social History     Tobacco Use   Smoking Status Current Every Day Smoker   • Packs/day: 0 50   • Years: 35 00   • Pack years: 17 50   • Types: Cigarettes   Smokeless Tobacco Never Used       Meds/Allergies   all medications and allergies reviewed  Allergies   Allergen Reactions   • Chantix [Varenicline]    • Ibuprofen Other (See Comments)     Upset stomach   • Lyrica [Pregabalin] Other (See Comments)     bruising   • Penicillins Other (See Comments)     ? hives   • Sulfa Antibiotics Other (See Comments)     sloughing skin in mouth   • Sulfasalazine        Additional Data:     Labs:    Results from last 7 days   Lab Units 09/19/22  0829   WBC Thousand/uL 9 38   HEMOGLOBIN g/dL 12 6   HEMATOCRIT % 38 4   PLATELETS Thousands/uL 397*   NEUTROS PCT % 85*   LYMPHS PCT % 10*   MONOS PCT % 4   EOS PCT % 0     Results from last 7 days   Lab Units 09/20/22  0606 09/19/22  2350   SODIUM mmol/L  --  129*   POTASSIUM mmol/L  --  4 9   CHLORIDE mmol/L  --  94*   CO2 mmol/L  --  23   BUN mg/dL  --  53*   CREATININE mg/dL  --  6 21*   ANION GAP mmol/L  --  12   CALCIUM mg/dL  --  7 5*   ALBUMIN g/dL 1 8*  --    TOTAL BILIRUBIN mg/dL 0 35  --    ALK PHOS U/L 79  --    ALT U/L 512*  --    AST U/L 970*  --    GLUCOSE RANDOM mg/dL  --  62*     Results from last 7 days   Lab Units 09/19/22  1047   INR  1 11             Results from last 7 days   Lab Units 09/20/22  0606 09/19/22  1222   LACTIC ACID mmol/L  --  0 4*   PROCALCITONIN ng/ml 0 31*  --          * I Have Reviewed All Lab Data Listed Above  * Additional Pertinent Lab Tests Reviewed: All Labs Within Last 24 Hours Reviewed    Imaging:    Imaging Personally Reviewed by Myself Includes:  Ct chest abdomen wo contrast, Ct lower extremity wo contrast right, venous duplex      Recent Cultures (last 7 days):           Last 24 Hours Medication List: Current Facility-Administered Medications   Medication Dose Route Frequency Provider Last Rate   • acetaminophen  650 mg Oral Q6H PRN Fredi Bean DO     • heparin (porcine)  3-30 Units/kg/hr (Order-Specific) Intravenous Titrated Rajwinder Hall DO 18 Units/kg/hr (09/20/22 8664)   • heparin (porcine)  2,400 Units Intravenous Q1H PRN Rajwinder Hall DO     • heparin (porcine)  4,800 Units Intravenous Q1H PRN Fred Kay DO     • nicotine  1 patch Transdermal Daily Atul Adan DO     • ondansetron  4 mg Intravenous Q4H PRN Fredi Bean DO     • QUEtiapine  25 mg Oral HS Atul Adan DO     • sodium chloride  125 mL/hr Intravenous Continuous CARLITOS Cervantes 125 mL/hr (09/20/22 0516)   • venlafaxine  150 mg Oral Daily Fredi Bean DO          Today, Patient Was Seen By: ANGELITO GravesS

## 2022-09-20 NOTE — SPEECH THERAPY NOTE
Speech Language/Pathology  Speech/Language Pathology  Assessment    Patient Name: Keaton Rebolledo  Today's Date: 2022     Problem List  Principal Problem:    Encephalopathy acute  Active Problems:    Depression    Tobacco abuse    DDD (degenerative disc disease), lumbosacral    GERD (gastroesophageal reflux disease)    ARF (acute renal failure) (HCC)    Transaminitis    Abnormal CT of the chest    Traumatic rhabdomyolysis [facility])    Past Medical History  Past Medical History:   Diagnosis Date   • Chronic pain disorder    • Depression    • GERD (gastroesophageal reflux disease)    • History of electroconvulsive therapy    • Low back pain    • Self-injurious behavior    • Suicide attempt [facility])      Past Surgical History  Past Surgical History:   Procedure Laterality Date   •  SECTION     • COLONOSCOPY     • PANCREAS SURGERY      "pseudocysts" per patient's  Ricki Infante   • [place] ESOPHAGOGASTRODUODENOSCOPY TRANSORAL DIAGNOSTIC N/A 4/10/2018    Procedure: EGD AND COLONOSCOPY;  Surgeon: Nicol Morris MD;  [place] [facility]; Service: Gastroenterology        Bedside Swallow Evaluation:    Summary:  Pt presented w/ confusion  Able to tell me her  and where she was but often did not respond to ?'s  Delayed laugh  Tardive dyskinesia type oral movemnts noted  More so lips than tongue  Unable to feed self except x 1 presentation  Lunch tray was set up for her, chicken was cut into small pieces  Took large amt of chicken and chewed excessively  Much more prompt w/ the soft broccoli  Also took thin water and soda by straw  Adequate bolus control and transfer  Swallows prompt  No overt s/s aspiration  Overall intake was reduced  Pt did not want any more  Mental status affecting oral intake and tolerance  At least mild oral stage, no pharyngeal s/s this eval      Recommendations:  Diet: dysphagia 3 dental soft w/ thin liquids     Liquid:thin  Meds: as tolerated  Supervision: full for now until mental status improves  Positioning:Upright  Strategies: set up tray, assist/feed as needed  Pt to take PO/Meds only when fully alert and upright  Oral care  Aspiration precautions  Reflux precautions  Therapy Prognosis: seems favorable as mental status improves  Frequency: ? F/u 2-4 visits    Consider consult w/:  Rehab  Nutrition    Goal(s):  Dysphagia LTG  -Patient will demonstrate safe and effective oral intake (without overt s/s significant oral/pharyngeal dysphagia including s/s penetration or aspiration) for the highest appropriate diet level  1 Pt will tolerate least restrictive diet w/out s/s aspiration or oral/pharyngeal difficulties  2 Pt will will effectively masticate and transfer solids w/out s/s dysphagia/aspiration  3 Pt will tolerate thin liquids w/out s/s aspiration  H&P/Admit info/ pertinent provider notes: (PMH noted above)  Chief Complaint:     Altered Mental Status (Pt arrives via ems from home  called ems because pt is is confused this morning  Pt is alert to self and place  But can not answer any other questions  Pt did have a fall 4 days ago  Pt denies drugs and alcohol use Pt denies cp and sob )  History of Present Illness:  Marnio Pascual is a 45 y o  female with a past medical history of depression and lumbar radiculopathy who presents with change in mental status  Spouse and daughter at bedside help provide history  The patient due to have spine surgery next month  She has been under the care of pain management  Nucynta previously did not provide relief and was switched MSIR 15 mg the last three weeks  The patient was lethargic shortly after but then tolerated morphine  On Friday suffered a non witnessed fall likely falling out of bed  She has been extremely sedentary over the weekend and more lethargic with decreased oral intake  She had worsening right leg pain    EMS was summoned today to worsening encephalopathy in [facility] where she was found have elevated D-dimer and renal failure prompting admission  Upon further investigation she was also found to have rhabdomyolysis  Special Studies:  9/19/22 ct chest/abd/pelvis  Bilateral groundglass opacities with superimposed inter and intralobular septal thickening  Differential diagnosis includes pulmonary hemorrhage, infection, and pulmonary edema, although the distribution is atypical for edema  CT C spine:9/19/22 (see report for complete findings)  ALIGNMENT:  No acute posttraumatic malalignment  Stable mild reversal of the usual cervical lordosis and minimal grade 1 degenerative anterolisthesis of C3 on C4 and C4 on C5  DEGENERATIVE CHANGES:  Moderate left C3-4 and right C4-5 facet degenerative changes  Mild multilevel cervical degenerative changes elsewhere without critical central canal stenosis  No cervical spine fracture or traumatic malalignment  Incidental finding of partially visualized subpleural groundglass opacity in the left pulmonary apex presumably scarring  Cannot exclude infiltrate  This study demonstrates a significant  finding and was documented as such in [facility] for liaison and referring practitioner notification  Previous VBS:  none    Patient's goal: none stated    Did the pt report pain? no  If yes, was nursing notified/was it addressed? na    Reason for consult:  R/o aspiration  Determine safest and least restrictive diet  Change in mental status  Masticating a very small bite of egg w/ pt/ot this am  Needed max cues to transfer and swallow  Precautions:  Aspiration  Fall  Food allergies:  none  Current diet:  regular  Premorbid diet[de-identified]  regular  O2 requirement:  none  Voice/Speech:  Slow rate  Social/Prior living environment:  Lives w/ spouse  Follows commands:  poorly                    Cognitive Status:  confused  Oral mech exam:  Dentition:seems to have most, some are discolored /clack at the gum line  She did not follow commands for oral mech     Lips (VII):wfl at rest  Tongue (XII):grossll wfl  Mandible (V): appeared functional  Face/oral sensation (V): dnt  Velum (X):dnt  Secretion management:mouth dry    Esophageal stage:  H/o GERD  EGD 4/10/18  FINDINGS:  #1  Esophagus and GEJ-the proximal, mid and distal esophagus were normal   There was a possible subcentimeter area of Gonzales's esophagus seen at the GE junction  Biopsies were obtained  The GE junction was at 40 cm from the incisors  #2  Stomach-mild nodular mucosa seen of the cardia and the gastric body  Biopsies were obtained to rule out H pylori  Otherwise normal stomach  #3  Duodenum-normal duodenal bulb and 2nd portion  Random biopsies were done to rule out celiac disease  IMPRESSIONS:    1  Possible Gonzales's esophagus short-segment  Biopsies were done  2   Normal stomach with mild nodular mucosa  Biopsies done to rule out H pylori  3   Normal duodenum  Biopsies were to rule out celiac disease  RECOMMENDATIONS:   1  Follow up with results of the biopsies with Dr Sim Woodall in 2 weeks      Aspiration precautions posted    Results d/w:  Pt, nursing, physician

## 2022-09-20 NOTE — ASSESSMENT & PLAN NOTE
· Lumbar radiculopathy due for surgery  · Prior to admission was on gabapentin and morphine IR 15 mg   · Confirmed on   Holding both for now given kidney injury

## 2022-09-21 LAB
ALBUMIN SERPL BCP-MCNC: 1.6 G/DL (ref 3.5–5)
ALP SERPL-CCNC: 73 U/L (ref 46–116)
ALT SERPL W P-5'-P-CCNC: 407 U/L (ref 12–78)
ANION GAP SERPL CALCULATED.3IONS-SCNC: 11 MMOL/L (ref 4–13)
ANION GAP SERPL CALCULATED.3IONS-SCNC: 9 MMOL/L (ref 4–13)
APTT PPP: 111 SECONDS (ref 23–37)
APTT PPP: 67 SECONDS (ref 23–37)
AST SERPL W P-5'-P-CCNC: 489 U/L (ref 5–45)
BILIRUB DIRECT SERPL-MCNC: 0.13 MG/DL (ref 0–0.2)
BILIRUB SERPL-MCNC: 0.3 MG/DL (ref 0.2–1)
BUN SERPL-MCNC: 52 MG/DL (ref 5–25)
BUN SERPL-MCNC: 54 MG/DL (ref 5–25)
CALCIUM SERPL-MCNC: 8.3 MG/DL (ref 8.3–10.1)
CALCIUM SERPL-MCNC: 8.3 MG/DL (ref 8.3–10.1)
CHLORIDE SERPL-SCNC: 98 MMOL/L (ref 96–108)
CHLORIDE SERPL-SCNC: 98 MMOL/L (ref 96–108)
CK MB SERPL-MCNC: 1.7 % (ref 0–2.5)
CK MB SERPL-MCNC: 85.7 NG/ML (ref 0–5)
CK SERPL-CCNC: 5033 U/L (ref 26–192)
CMV IGG SERPL IA-ACNC: <0.6 U/ML (ref 0–0.59)
CMV IGM SERPL IA-ACNC: <30 AU/ML (ref 0–29.9)
CO2 SERPL-SCNC: 24 MMOL/L (ref 21–32)
CO2 SERPL-SCNC: 25 MMOL/L (ref 21–32)
CREAT SERPL-MCNC: 6.72 MG/DL (ref 0.6–1.3)
CREAT SERPL-MCNC: 6.74 MG/DL (ref 0.6–1.3)
EBV NA IGG SER IA-ACNC: 33.5 U/ML (ref 0–17.9)
EBV VCA IGG SER IA-ACNC: 267 U/ML (ref 0–17.9)
EBV VCA IGM SER IA-ACNC: <36 U/ML (ref 0–35.9)
ERYTHROCYTE [DISTWIDTH] IN BLOOD BY AUTOMATED COUNT: 13.2 % (ref 11.6–15.1)
GFR SERPL CREATININE-BSD FRML MDRD: 6 ML/MIN/1.73SQ M
GFR SERPL CREATININE-BSD FRML MDRD: 6 ML/MIN/1.73SQ M
GLUCOSE SERPL-MCNC: 86 MG/DL (ref 65–140)
GLUCOSE SERPL-MCNC: 92 MG/DL (ref 65–140)
HCT VFR BLD AUTO: 37.7 % (ref 34.8–46.1)
HGB BLD-MCNC: 12.2 G/DL (ref 11.5–15.4)
INTERPRETATION: ABNORMAL
MCH RBC QN AUTO: 32.9 PG (ref 26.8–34.3)
MCHC RBC AUTO-ENTMCNC: 32.4 G/DL (ref 31.4–37.4)
MCV RBC AUTO: 102 FL (ref 82–98)
PHOSPHATE SERPL-MCNC: 7.2 MG/DL (ref 2.7–4.5)
PLATELET # BLD AUTO: 329 THOUSANDS/UL (ref 149–390)
PMV BLD AUTO: 9 FL (ref 8.9–12.7)
POTASSIUM SERPL-SCNC: 5.3 MMOL/L (ref 3.5–5.3)
POTASSIUM SERPL-SCNC: 5.4 MMOL/L (ref 3.5–5.3)
PROT SERPL-MCNC: 5 G/DL (ref 6.4–8.4)
RBC # BLD AUTO: 3.71 MILLION/UL (ref 3.81–5.12)
SODIUM SERPL-SCNC: 132 MMOL/L (ref 135–147)
SODIUM SERPL-SCNC: 133 MMOL/L (ref 135–147)
WBC # BLD AUTO: 5.97 THOUSAND/UL (ref 4.31–10.16)

## 2022-09-21 RX ADMIN — SODIUM CHLORIDE 125 ML/HR: 0.9 INJECTION, SOLUTION INTRAVENOUS at 07:15

## 2022-09-21 RX ADMIN — SODIUM CHLORIDE 125 ML/HR: 0.9 INJECTION, SOLUTION INTRAVENOUS at 21:21

## 2022-09-21 RX ADMIN — Medication 1 PATCH: at 08:31

## 2022-09-21 RX ADMIN — QUETIAPINE FUMARATE 25 MG: 25 TABLET ORAL at 21:18

## 2022-09-21 RX ADMIN — HEPARIN SODIUM 24 UNITS/KG/HR: 10000 INJECTION, SOLUTION INTRAVENOUS at 05:21

## 2022-09-21 RX ADMIN — SODIUM CHLORIDE 125 ML/HR: 0.9 INJECTION, SOLUTION INTRAVENOUS at 15:09

## 2022-09-21 RX ADMIN — VENLAFAXINE HYDROCHLORIDE 150 MG: 150 CAPSULE, EXTENDED RELEASE ORAL at 08:30

## 2022-09-21 NOTE — ASSESSMENT & PLAN NOTE
· Acute encephalopathy appears secondary to gabapentin and morphine with subsequent renal failure  · Also had hypoglycemia requiring D50  No further hypoglycemia  · Continue holding gabapentin and morphine for now  Restart when appropriate    · Unfortunately given persistent encephalopathy will consult neurology and check MRI of brain

## 2022-09-21 NOTE — NURSING NOTE
Heparin was placed on hold as per order due to [id number]  Order was to hold x1 hour then decrease by 3 units

## 2022-09-21 NOTE — UTILIZATION REVIEW
Inpatient Admission Authorization Request   NOTIFICATION OF INPATIENT ADMISSION/INPATIENT AUTHORIZATION REQUEST   SERVICING FACILITY:   [address] [place] [address], [place] [address] [address] [facility]  Tax ID: [id number]  NPI: [id number]  Place of Service: Inpatient [address] [facility]  [address]  Place of Service Code: 24     ATTENDING PROVIDER:  Attending Name and NPI#: Gregory Joseph [[id number]]  Address: [address] [place] [address], [place] [address] [address] [facility]  Phone: [phone]     UTILIZATION REVIEW CONTACT:  Lindsay Patten, Utilization   Network Utilization Review Department  Phone: [phone]  Fax: [phone]  Email: [email or web address]     PHYSICIAN ADVISORY SERVICES:  FOR [id number]-[id number] REVIEW - MEDICAL NECESSITY DENIAL  Phone: [phone]  Fax: [phone]  Email: [email or web address]     TYPE OF REQUEST:  Inpatient Status     ADMISSION INFORMATION:  ADMISSION DATE/TIME: 9/19/22 10:50 AM  PATIENT DIAGNOSIS CODE/DESCRIPTION:  Hyponatremia [E87 1]  Altered mental status [R41 82]  ARF (acute renal failure) (HCC) [N17 9]  Hypoglycemia [E16 2]  Transaminitis [R74 01]  Elevated liver enzymes [R74 8]  Positive D dimer [R79 89]  ANUJA (acute kidney injury) ([facility] [address] ) [N17 9]  Swelling of right foot [M79 89]  DISCHARGE DATE/TIME: No discharge date for patient encounter  IMPORTANT INFORMATION:  Please contact Lindsay Patten directly with any questions or concerns regarding this request  Department voicemails are confidential     Send requests for admission clinical reviews, concurrent reviews, approvals, and administrative denials due to lack of clinical to fax [phone]

## 2022-09-21 NOTE — ASSESSMENT & PLAN NOTE
· Secondary to rhabdomyolysis  No evidence of renal obstruction on imaging  · Appreciate nephrology evaluation  Awaiting renal recovery  Continue IV fluids      Results from last 7 days   Lab Units 09/21/22  1415 09/21/22  0905 09/20/22  2326 09/20/22  1526 09/20/22  0909 09/19/22  2350 09/19/22  1708 09/19/22  1222 09/19/22  0829   BUN mg/dL 54* 52* 52* 54* 55* 53* 54* 53* 53*   CREATININE mg/dL 6 74* 6 72* 6 53* 6 64* 6 73* 6 21* 6 03* 5 82* 5 88*   EGFR ml/min/1 73sq m 6 6 6 6 6 7 7 7 7

## 2022-09-21 NOTE — ASSESSMENT & PLAN NOTE
· Secondary to fall on Friday  IV fluids per Nephrology  · Venous duplex negative for DVT      · Elevated D-dimer continue empiric heparin infusion for now    Results from last 7 days   Lab Units 09/21/22  0550 09/20/22  0606 09/19/22  0829   CK TOTAL U/L 5,033* 14,147* 67,606*

## 2022-09-21 NOTE — SPEECH THERAPY NOTE
Speech Language/Pathology    Speech/Language Pathology Progress Note    Patient Name: Sakshi Garcia  VRGXI'S Date: 2022                     SLP RECOMMENDATIONS:         Diet: Level 3 Dental soft         Liquids: Thin         Medications: As tolerated         Strategies: Slow rate, small bites/sips, upright          Subjective:  Pt disoriented and impulsive  Objective:  Pt crying out upon arrival  Pt initially inconsolable and unable to explain why she was upset  RN present and aware  Pt eventually calmed and was agreeable to a few bites of lunch  Pt continued to be disoriented and agitated throughout meal  Pt inconsistently responded to questions, but did deny difficulty chewing or swallowing  Pt observed with soft solids with slow, but functional mastication  Pt required cueing to not talk while eating  Pt with tendency to take large bite-size  No overt s/s aspiration appreciated throughout meal with thin liquids or soft solids  SLP reviewed general aspiration precautions with pt; suspect poor carryover given AMS  Assessment:  Slow, but functional mastication  No overt s/s aspiration  AMS impacting eating       Plan/Recommendations:  Level 3 Dental Soft/thin liquids  General aspiration precautions   SLP to follow     Problem List  Principal Problem:    Encephalopathy acute  Active Problems:    Depression    Tobacco abuse    DDD (degenerative disc disease), lumbosacral    GERD (gastroesophageal reflux disease)    ARF (acute renal failure) (HCC)    Transaminitis    Abnormal CT of the chest    Traumatic rhabdomyolysis [facility])       Past Medical History  Past Medical History:   Diagnosis Date   • Chronic pain disorder    • Depression    • GERD (gastroesophageal reflux disease)    • History of electroconvulsive therapy    • Low back pain    • Self-injurious behavior    • Suicide attempt [facility])         Past Surgical History  Past Surgical History:   Procedure Laterality Date   •  SECTION     • COLONOSCOPY     • PANCREAS SURGERY      "pseudocysts" per patient's  Ricki Infante   • [place] ESOPHAGOGASTRODUODENOSCOPY TRANSORAL DIAGNOSTIC N/A 4/10/2018    Procedure: EGD AND COLONOSCOPY;  Surgeon: Nay Small MD;  [place] [facility]  GI [facility];   Service: Gastroenterology

## 2022-09-21 NOTE — PLAN OF CARE
Problem: PAIN - ADULT  Goal: Verbalizes/displays adequate comfort level or baseline comfort level  Description: Interventions:  - Encourage patient to monitor pain and request assistance  - Assess pain using appropriate pain scale  - Administer analgesics based on type and severity of pain and evaluate response  - Implement non-pharmacological measures as appropriate and evaluate response  - Consider cultural and social influences on pain and pain management  - Notify physician/advanced practitioner if interventions unsuccessful or patient reports new pain  Outcome: Progressing     Problem: INFECTION - ADULT  Goal: Absence or prevention of progression during hospitalization  Description: INTERVENTIONS:  - Assess and monitor for signs and symptoms of infection  - Monitor lab/diagnostic results  - Monitor all insertion sites, i e  indwelling lines, tubes, and drains  - Monitor endotracheal if appropriate and nasal secretions for changes in amount and color  - [place] appropriate cooling/warming therapies per order  - Administer medications as ordered  - Instruct and encourage patient and family to use good hand hygiene technique  - Identify and instruct in appropriate isolation precautions for identified infection/condition  Outcome: Progressing  Goal: Absence of fever/infection during neutropenic period  Description: INTERVENTIONS:  - Monitor WBC    Outcome: Progressing     Problem: SAFETY ADULT  Goal: Patient will remain free of falls  Description: INTERVENTIONS:  - Educate patient/family on patient safety including physical limitations  - Instruct patient to call for assistance with activity   - Consult OT/PT to assist with strengthening/mobility   - Keep Call bell within reach  - Keep bed low and locked with side rails adjusted as appropriate  - Keep care items and personal belongings within reach  - Initiate and maintain comfort rounds  - Make Fall Risk Sign visible to staff  - Offer Toileting every  Hours, in advance of need  - Initiate/Maintain alarm  - Obtain necessary fall risk management equipment:   - Apply yellow socks and bracelet for high fall risk patients  - Consider moving patient to room near nurses station  Outcome: Progressing  Goal: Maintain or return to baseline ADL function  Description: INTERVENTIONS:  -  Assess patient's ability to carry out ADLs; assess patient's baseline for ADL function and identify physical deficits which impact ability to perform ADLs (bathing, care of mouth/teeth, toileting, grooming, dressing, etc )  - Assess/evaluate cause of self-care deficits   - Assess range of motion  - Assess patient's mobility; develop plan if impaired  - Assess patient's need for assistive devices and provide as appropriate  - Encourage maximum independence but intervene and supervise when necessary  - Involve family in performance of ADLs  - Assess for home care needs following discharge   - Consider OT consult to assist with ADL evaluation and planning for discharge  - Provide patient education as appropriate  Outcome: Progressing  Goal: Maintains/Returns to pre admission functional level  Description: INTERVENTIONS:  - Perform BMAT or MOVE assessment daily    - Set and communicate daily mobility goal to care team and patient/family/caregiver  - Collaborate with rehabilitation services on mobility goals if consulted  - Perform Range of Motion  times a day  - Reposition patient every  hours    - Dangle patient  times a day  - Stand patient  times a day  - Ambulate patient  times a day  - Out of bed to chair  times a day   - Out of bed for basilio times a day  - Out of bed for toileting  - Record patient progress and toleration of activity level   Outcome: Progressing

## 2022-09-21 NOTE — PROGRESS NOTES
[facility]  Progress Note - Alejandro Benavides 1970, 45 y o  female MRN: [id number]  Unit/Bed#: [place] [address] -01 Encounter: [id number]  Primary Care Provider: Meron Marroquin MD   Date and time admitted to hospital: 9/19/2022  8:04 AM    * Encephalopathy acute  Assessment & Plan  · Acute encephalopathy appears secondary to gabapentin and morphine with subsequent renal failure  · Also had hypoglycemia requiring D50  No further hypoglycemia  · Continue holding gabapentin and morphine for now  Restart when appropriate  · Unfortunately given persistent encephalopathy will consult neurology and check MRI of brain    ARF (acute renal failure) ([facility] [address] )  Assessment & Plan  · Secondary to rhabdomyolysis  No evidence of renal obstruction on imaging  · Appreciate nephrology evaluation  Awaiting renal recovery  Continue IV fluids  Results from last 7 days   Lab Units 09/21/22  1415 09/21/22  0905 09/20/22  2326 09/20/22  1526 09/20/22  0909 09/19/22  2350 09/19/22  1708 09/19/22  1222 09/19/22  0829   BUN mg/dL 54* 52* 52* 54* 55* 53* 54* 53* 53*   CREATININE mg/dL 6 74* 6 72* 6 53* 6 64* 6 73* 6 21* 6 03* 5 82* 5 88*   EGFR ml/min/1 73sq m 6 6 6 6 6 7 7 7 7       Transaminitis  Assessment & Plan  · Secondary to rhabdomyolysis  No evidence of liver injury on CT imaging  · Seen by GI  Hepatic duplex also negative  Results from last 7 days   Lab Units 09/21/22  0550 09/20/22  0606 09/19/22  0829   AST U/L 489* 970* 1,683*   ALT U/L 407* 512* 674*   TOTAL BILIRUBIN mg/dL 0 30 0 35 0 58       Traumatic rhabdomyolysis (HCC)  Assessment & Plan  · Secondary to fall on Friday  IV fluids per Nephrology  · Venous duplex negative for DVT      · Elevated D-dimer continue empiric heparin infusion for now    Results from last 7 days   Lab Units 09/21/22  0550 09/20/22  0606 09/19/22  0829   CK TOTAL U/L 5,033* 14,147* 32,634*       Abnormal CT of the chest  Assessment & Plan  · Left-sided opacities noted   COVID negative  · Procalcitonin only minimally elevated doubt active infection    Results from last 7 days   Lab Units 09/20/22  0606   PROCALCITONIN ng/ml 0 31*       GERD (gastroesophageal reflux disease)  Assessment & Plan  · Holding PPI given renal injury    DDD (degenerative disc disease), lumbosacral  Assessment & Plan  · Lumbar radiculopathy due for surgery  · Prior to admission was on gabapentin and morphine IR 15 mg   · Confirmed on   Holding both for now given kidney injury  Tobacco abuse  Assessment & Plan  · Nicotine patch ordered    Depression  Assessment & Plan  · Continue venlafaxine and quetiapine      VTE Pharmacologic Prophylaxis:  Moderate Risk (Score 3-4) - Pharmacological DVT Prophylaxis Ordered: heparin drip  Patient Centered Rounds: I have performed bedside rounds with nursing staff today  Discussions with Specialists or Other Care Team Provider:  GI and Nephrology    Education and Discussions with Family / Patient: Updated  ( and daughter) at bedside  Time Spent for Care: 25 mins  More than 50% of total time spent on counseling and coordination of care as described above  Current Length of Stay: 2 day(s)  Current Patient Status: Inpatient   Certification Statement: The patient will continue to require additional inpatient hospital stay due to persistent encephalopathy with renal failure  Discharge Plan / Estimated Discharge Date: Anticipate discharge in >72 hrs to rehab facility  Code Status: Level 1 - Full Code      Subjective:   Patient seen and examined during morning rounds  Spouse at bedside  Objective:   Vitals: Blood pressure 136/86, pulse 99, temperature (!) 97 4 °F (36 3 °C), resp  rate 16, height 5' 6" (1 676 m), weight 64 6 kg (142 lb 6 7 oz), last menstrual period 03/14/2019, SpO2 95 %      Intake/Output Summary (Last 24 hours) at 9/21/2022 1930  Last data filed at 9/21/2022 1801  Gross per 24 hour   Intake 1400 ml   Output 900 ml   Net 500 ml       Physical Exam  Vitals reviewed  Constitutional:       General: She is not in acute distress  Appearance: Normal appearance  HENT:      Head: Atraumatic  Eyes:      Extraocular Movements: Extraocular movements intact  Cardiovascular:      Rate and Rhythm: Regular rhythm  Heart sounds: Normal heart sounds  Pulmonary:      Breath sounds: Decreased breath sounds present  No wheezing  Abdominal:      General: Bowel sounds are normal       Palpations: Abdomen is soft  Tenderness: There is no guarding or rebound  Musculoskeletal:         General: No swelling  Cervical back: Normal range of motion  Skin:     General: Skin is warm  Comments: Abrasion to right knee and chin   Neurological:      Mental Status: She is alert  She is disoriented  Motor: Weakness (Right leg) present     Psychiatric:         Mood and Affect: Mood normal        Additional Data:   Labs:  Results from last 7 days   Lab Units 09/21/22  0550 09/19/22  1047 09/19/22  0829   WBC Thousand/uL 5 97  --  9 38   HEMOGLOBIN g/dL 12 2  --  12 6   PLATELETS Thousands/uL 329  --  397*   MCV fL 102*  --  101*   INR   --  1 11  --      Results from last 7 days   Lab Units 09/21/22  1415 09/21/22  0905 09/21/22  0550 09/20/22  2326 09/20/22  0909 09/20/22  0606 09/19/22  1222 09/19/22  0829   SODIUM mmol/L 133* 132*  --  129*   < >  --    < > 126*   POTASSIUM mmol/L 5 4* 5 3  --  4 6   < >  --    < > 5 4*   CHLORIDE mmol/L 98 98  --  97   < >  --    < > 87*   CO2 mmol/L 24 25  --  23   < >  --    < > 24   ANION GAP mmol/L 11 9  --  9   < >  --    < > 15*   BUN mg/dL 54* 52*  --  52*   < >  --    < > 53*   CREATININE mg/dL 6 74* 6 72*  --  6 53*   < >  --    < > 5 88*   CALCIUM mg/dL 8 3 8 3  --  7 8*   < >  --    < > 7 8*   ALBUMIN g/dL  --   --  1 6*  --   --  1 8*  --  2 3*   TOTAL BILIRUBIN mg/dL  --   --  0 30  --   --  0 35  --  0 58   ALK PHOS U/L  --   --  73  --   --  79  --  98   ALT U/L  -- --  407*  --   --  512*  --  674*   AST U/L  --   --  489*  --   --  970*  --  1,683*   EGFR ml/min/1 73sq m 6 6  --  6   < >  --    < > 7   GLUCOSE RANDOM mg/dL 86 92  --  100   < >  --    < > 56*    < > = values in this interval not displayed  Results from last 7 days   Lab Units 09/21/22  0550   PHOSPHORUS mg/dL 7 2*     Results from last 7 days   Lab Units 09/21/22  0550 09/20/22  0606 09/19/22  0829   CK TOTAL U/L 5,033* 14,147* 32,634*   CK MB INDEX % 1 7 1 2 1 1     Results from last 7 days   Lab Units 09/19/22  1220 09/19/22  1047 09/19/22  0829   HS TNI 0HR ng/L  --   --  80*   HS TNI 2HR ng/L  --  73*  --    HS TNI 4HR ng/L 84*  --   --           Results from last 7 days   Lab Units 09/20/22  0606 09/19/22  1222   LACTIC ACID mmol/L  --  0 4*   PROCALCITONIN ng/ml 0 31*  --      Results from last 7 days   Lab Units 09/20/22  1154   POC GLUCOSE mg/dl 90         Results from last 7 days   Lab Units 09/19/22  0829   TSH 3RD GENERATON uIU/mL 3 047     * I Have Reviewed All Lab Data Listed Above  Cultures:   Results from last 7 days   Lab Units 09/19/22  1401   INFLUENZA A PCR  Negative       Results from last 7 days   Lab Units 09/19/22  1401   SARS-COV-2  Negative   INFLUENZA A PCR  Negative   INFLUENZA B PCR  Negative   RSV PCR  Negative           Lines/Drains:  Invasive Devices  Report    Peripheral Intravenous Line  Duration           Peripheral IV 09/19/22 Left Forearm 2 days          Drain  Duration           Urethral Catheter 16 Fr  1 day              Telemetry:      Imaging:  Imaging Reports Reviewed Today Include:   CT chest abdomen pelvis wo contrast    Result Date: 9/19/2022  Impression: Bilateral groundglass opacities with superimposed inter and intralobular septal thickening  Differential diagnosis includes pulmonary hemorrhage, infection, and pulmonary edema, although the distribution is atypical for edema  The study was marked in [facility] for immediate notification   Workstation performed: [id number]     XR hip/pelv 2-3 vws right    Result Date: 9/19/2022  Impression: No acute osseous abnormality  Workstation performed: [id number]     XR ankle 3+ views RIGHT    Result Date: 9/19/2022  Impression: No acute osseous abnormality  Workstation performed: [id number]     XR foot 3+ views RIGHT    Result Date: 9/19/2022  Impression: No acute osseous abnormality  Workstation performed: [id number]     CT head without contrast    Result Date: 9/19/2022  Impression: No acute intracranial process  No skull fracture  Workstation performed: [id number]     CT spine cervical without contrast    Result Date: 9/19/2022  Impression: No cervical spine fracture or traumatic malalignment  Incidental finding of partially visualized subpleural groundglass opacity in the left pulmonary apex presumably scarring  Cannot exclude infiltrate  This study demonstrates a significant  finding and was documented as such in [facility] for liaison and referring practitioner notification  Workstation performed: [id number]     XR chest 1 view    Result Date: 9/19/2022  Impression: No acute cardiopulmonary disease  Findings are stable Workstation performed: [id number]     US right upper quadrant with liver dopplers    Result Date: 9/20/2022  Impression: 1  Minimal layering sludge without evidence of acute cholecystitis  2   Normal-appearing liver with normal liver Dopplers  Workstation performed: [id number]     CT lower extremity wo contrast right    Result Date: 9/19/2022  Impression: There is no evidence of intramuscular hematoma or other mass lesion in the right calf  Please note that rhabdomyolysis and compartment syndrome are clinical diagnoses, and are not excluded based on these imaging findings   Workstation performed: [id number]       Scheduled Meds:  Current Facility-Administered Medications   Medication Dose Route Frequency Provider Last Rate   • acetaminophen  650 mg Oral Q6H PRN Kristen Whitman DO     • heparin (porcine) 3-30 Units/kg/hr (Order-Specific) Intravenous Titrated Rajwinder Hall DO 21 Units/kg/hr (09/21/22 1901)   • heparin (porcine)  2,400 Units Intravenous Q1H PRN Rajwinder Hall DO     • heparin (porcine)  4,800 Units Intravenous Q1H PRN Mary Beth Tee DO     • nicotine  1 patch Transdermal Daily Atul Adan DO     • ondansetron  4 mg Intravenous Q4H PRN Yaquelin Wells DO     • QUEtiapine  25 mg Oral HS Atul Adan DO     • sodium chloride  125 mL/hr Intravenous Continuous Erin Sathya, CRNP 125 mL/hr (09/21/22 1509)   • venlafaxine  150 mg Oral Daily Yaquelin Wells DO         Today, Patient Was Seen By: Yaquelin Wells DO    ** Please Note: Dictation voice to text software may have been used in the creation of this document   **

## 2022-09-21 NOTE — ASSESSMENT & PLAN NOTE
· Secondary to rhabdomyolysis  No evidence of liver injury on CT imaging  · Seen by GI  Hepatic duplex also negative      Results from last 7 days   Lab Units 09/21/22  0550 09/20/22  0606 09/19/22  0829   AST U/L 489* 970* 1,683*   ALT U/L 407* 512* 674*   TOTAL BILIRUBIN mg/dL 0 30 0 35 0 58

## 2022-09-21 NOTE — PROGRESS NOTES
Progress Note - Rody Lamas 45 y o  female MRN: [id number]    Unit/Bed#: [place] [address] -01 Encounter: [id number]         Assessment/ Plan: Altered mental status  Elevated LFTs     Patient presented with altered mental status, after a fall on Friday, found to have acute kidney injury, hyponatremia, elevated CK consistent with rhabdomyolysis and elevated liver enzymes  AST is 1683 and ALT is 674, alkaline phosphatase and total bilirubin are within normal limits  This is likely all secondary to her rhabdomyolysis/ ischemic liver injury  No signs of liver damage on imaging  Ammonia levels within normal limits  Tylenol level is normal  Liver enzymes are improving, ,   Hepatitis panel (-)  -follow LFTs    GI to sign off, please call with questions      Subjective:   Pt denies abdominal pain  Complaining of leg pain  Objective:     Vitals: Blood pressure 136/86, pulse 97, temperature (!) 97 4 °F (36 3 °C), resp  rate 18, height 5' 6" (1 676 m), weight 64 6 kg (142 lb 6 7 oz), last menstrual period 03/14/2019, SpO2 91 %  ,Body mass index is 22 99 kg/m²  Physical Exam: General appearance: Alert, oriented to name & place  Abdomen: soft, non-tender; bowel sounds normal; no masses,  no organomegaly    Invasive Devices  Report    Peripheral Intravenous Line  Duration           Peripheral IV 09/19/22 Left Forearm 1 day          Drain  Duration           Urethral Catheter 16 Fr  <1 day                  Lab, Imaging and other studies: I have personally reviewed pertinent reports       CBC:   Lab Results   Component Value Date    WBC 5 97 09/21/2022    HGB 12 2 09/21/2022    HCT 37 7 09/21/2022     (H) 09/21/2022     09/21/2022    MCH 32 9 09/21/2022    MCHC 32 4 09/21/2022    RDW 13 2 09/21/2022    MPV 9 0 09/21/2022   ,   CMP:   Lab Results   Component Value Date    K 5 3 09/21/2022    CL 98 09/21/2022    CO2 25 09/21/2022    BUN 52 (H) 09/21/2022    CREATININE 6 72 (H) 09/21/2022 CALCIUM 8 3 09/21/2022     (H) 09/21/2022     (H) 09/21/2022    ALKPHOS 73 09/21/2022    EGFR 6 09/21/2022   ,   Lipase: No results found for: LIPASE,  PT/INR: No results found for: PT, INR,   Troponin: No results found for: TROPONINI,   Magnesium: No components found for: MAG,   Phosphorous:   Lab Results   Component Value Date    PHOS 7 2 (H) 09/21/2022

## 2022-09-21 NOTE — PLAN OF CARE
Problem: PAIN - ADULT  Goal: Verbalizes/displays adequate comfort level or baseline comfort level  Description: Interventions:  - Encourage patient to monitor pain and request assistance  - Assess pain using appropriate pain scale  - Administer analgesics based on type and severity of pain and evaluate response  - Implement non-pharmacological measures as appropriate and evaluate response  - Consider cultural and social influences on pain and pain management  - Notify physician/advanced practitioner if interventions unsuccessful or patient reports new pain  Outcome: Progressing     Problem: INFECTION - ADULT  Goal: Absence or prevention of progression during hospitalization  Description: INTERVENTIONS:  - Assess and monitor for signs and symptoms of infection  - Monitor lab/diagnostic results  - Monitor all insertion sites, i e  indwelling lines, tubes, and drains  - Monitor endotracheal if appropriate and nasal secretions for changes in amount and color  - [place] appropriate cooling/warming therapies per order  - Administer medications as ordered  - Instruct and encourage patient and family to use good hand hygiene technique  - Identify and instruct in appropriate isolation precautions for identified infection/condition  Outcome: Progressing  Goal: Absence of fever/infection during neutropenic period  Description: INTERVENTIONS:  - Monitor WBC    Outcome: Progressing     Problem: SAFETY ADULT  Goal: Patient will remain free of falls  Description: INTERVENTIONS:  - Educate patient/family on patient safety including physical limitations  - Instruct patient to call for assistance with activity   - Consult OT/PT to assist with strengthening/mobility   - Keep Call bell within reach  - Keep bed low and locked with side rails adjusted as appropriate  - Keep care items and personal belongings within reach  - Initiate and maintain comfort rounds  - Make Fall Risk Sign visible to staff  - Apply yellow socks and bracelet for high fall risk patients  - Consider moving patient to room near nurses station  Outcome: Progressing  Goal: Maintain or return to baseline ADL function  Description: INTERVENTIONS:  -  Assess patient's ability to carry out ADLs; assess patient's baseline for ADL function and identify physical deficits which impact ability to perform ADLs (bathing, care of mouth/teeth, toileting, grooming, dressing, etc )  - Assess/evaluate cause of self-care deficits   - Assess range of motion  - Assess patient's mobility; develop plan if impaired  - Assess patient's need for assistive devices and provide as appropriate  - Encourage maximum independence but intervene and supervise when necessary  - Involve family in performance of ADLs  - Assess for home care needs following discharge   - Consider OT consult to assist with ADL evaluation and planning for discharge  - Provide patient education as appropriate  Outcome: Progressing  Goal: Maintains/Returns to pre admission functional level  Description: INTERVENTIONS:  - Perform BMAT or MOVE assessment daily    - Set and communicate daily mobility goal to care team and patient/family/caregiver     - Collaborate with rehabilitation services on mobility goals if consulted  - Out of bed for toileting  - Record patient progress and toleration of activity level   Outcome: Progressing     Problem: DISCHARGE PLANNING  Goal: Discharge to home or other facility with appropriate resources  Description: INTERVENTIONS:  - Identify barriers to discharge w/patient and caregiver  - Arrange for needed discharge resources and transportation as appropriate  - Identify discharge learning needs (meds, wound care, etc )  - Arrange for interpretive services to assist at discharge as needed  - Refer to Case Management Department for coordinating discharge planning if the patient needs post-hospital services based on physician/advanced practitioner order or complex needs related to functional status, cognitive ability, or social support system  Outcome: Progressing     Problem: Knowledge Deficit  Goal: Patient/family/caregiver demonstrates understanding of disease process, treatment plan, medications, and discharge instructions  Description: Complete learning assessment and assess knowledge base    Interventions:  - Provide teaching at level of understanding  - Provide teaching via preferred learning methods  Outcome: Progressing     Problem: Potential for Falls  Goal: Patient will remain free of falls  Description: INTERVENTIONS:  - Educate patient/family on patient safety including physical limitations  - Instruct patient to call for assistance with activity   - Consult OT/PT to assist with strengthening/mobility   - Keep Call bell within reach  - Keep bed low and locked with side rails adjusted as appropriate  - Keep care items and personal belongings within reach  - Initiate and maintain comfort rounds  - Make Fall Risk Sign visible to staff  - Apply yellow socks and bracelet for high fall risk patients  - Consider moving patient to room near nurses station  Outcome: Progressing     Problem: MOBILITY - ADULT  Goal: Maintain or return to baseline ADL function  Description: INTERVENTIONS:  -  Assess patient's ability to carry out ADLs; assess patient's baseline for ADL function and identify physical deficits which impact ability to perform ADLs (bathing, care of mouth/teeth, toileting, grooming, dressing, etc )  - Assess/evaluate cause of self-care deficits   - Assess range of motion  - Assess patient's mobility; develop plan if impaired  - Assess patient's need for assistive devices and provide as appropriate  - Encourage maximum independence but intervene and supervise when necessary  - Involve family in performance of ADLs  - Assess for home care needs following discharge   - Consider OT consult to assist with ADL evaluation and planning for discharge  - Provide patient education as appropriate  Outcome: Progressing  Goal: Maintains/Returns to pre admission functional level  Description: INTERVENTIONS:  - Perform BMAT or MOVE assessment daily    - Set and communicate daily mobility goal to care team and patient/family/caregiver     - Collaborate with rehabilitation services on mobility goals if consulted  - Out of bed for toileting  - Record patient progress and toleration of activity level   Outcome: Progressing     Problem: Prexisting or High Potential for Compromised Skin Integrity  Goal: Skin integrity is maintained or improved  Description: INTERVENTIONS:  - Identify patients at risk for skin breakdown  - Assess and monitor skin integrity  - Assess and monitor nutrition and hydration status  - Monitor labs   - Assess for incontinence   - Turn and reposition patient  - Assist with mobility/ambulation  - Relieve pressure over bony prominences  - Avoid friction and shearing  - Provide appropriate hygiene as needed including keeping skin clean and dry  - Evaluate need for skin moisturizer/barrier cream  - Collaborate with interdisciplinary team   - Patient/family teaching  - Consider wound care consult   Outcome: Progressing

## 2022-09-21 NOTE — APP STUDENT NOTE
MARE STUDENT  Inpatient Progress Note for TRAINING ONLY  Not Part of Legal Medical Record     Progress Note - Caryle Harsh 45 y o  female MRN: [id number]  Unit/Bed#: [facility] 2 -01 Encounter: [id number]         Assessment/Plan:  1  Encephalopathy, acute  · Appears to be secondary to gabapentin and morphine with renal failure  · Hypoglycemic episode at admission requiring D50  No further episodes  · Hold both gabapentin and morphine  · Cortisol normal   · HSV, CMV, EBV panels pending  · AOx4 on 9/20, patient recalls fall and is aware of situation  · Speech therapy consulted, placed on Level 3 dental soft diet, with thin liquids  2  Traumatic rhabdomyolysis  · Following fall on Friday  · Swollen right lower extremity  · Venous duplex shows no evidence of DVT  · CT lower extremity shows no evidence of intramuscular hematoma or other mass lesion in the right calf  Does not exclude rhabdomyolysis and compartment syndrome  · D-dimer elavated, continue empiric heparin infusion  · CK continues to decrease 5,033 from admission level of 32,634  Continue IV fluids and trending CK  3  Transaminitis  · Secondary to rhabdomyolysis  · No evidence of liver injury on CT abdomen wo contrast    · AST::407 continues to decrease  · Low total protein 5 0, low albumin 1 6  Bilirubin, alkaline phospatase normal    · US right upper quadrant shows no liver damage  4  Acute renal failure  · Secondary to rhabdomyolysis  No evidence of renal obstruction on imaging  · Continue IV fluids, isotonic saline 74mL/hour  · Nephrology consulted  · Cr continues to increase to 6 72  Admission level of 5 88   5  Abnormal CT of chest  · Opacities noted in left lung  · Procalcitonin elevated at 0 31  Consider elevation due to decreased organ perfusion  · Negative for COVID, influenza, RSV  6  GERD  · Hold PPI until renal injury resolved     7  Degenerative disc disease, lumbosacral  · Lumbar radiculopathy, surgery coordinated  · Prior to admission on gabapentin, morphine IR 15mg  Hold for kidney injury  · Confirmed on PDMP  8  Tobacco abuse  · Nicotine patch while inpatient  9  Depression  · Continue venlafaxine (Effexor) and quetiapine (Seroquel)      VTE Pharmacologic Prophylaxis:   Pharmacologic: Heparin Drip  Mechanical VTE Prophylaxis in Place: No    Patient Centered Rounds: I have performed bedside rounds with nursing staff today  Discussions with Specialists or Other Care Team Provider: Discussed plan of care with Dr Es Mcleod,       Time Spent for Care: 30 minutes  More than 50% of total time spent on counseling and coordination of care as described above  Current Length of Stay: 2 day(s)    Current Patient Status: Inpatient   Certification Statement: The patient will continue to require additional inpatient hospital stay due to altered mental status    Discharge Plan: Anticipate discharge in >48 hours  Code Status: Level 1 - Full Code    Subjective:   Noreen Price is a 46yo female with PMH of depression, degenerative disc disease, on hospital day #2 after admission for metabolic encephalopathy following a non-witnessed fall Friday, and extreme lethargy over the weekend with reduced movement  Patient is seen sitting upright in bed, continuously shifting positions  Oriented to person, place, time, and reason for hospitalization  Complains of right leg pain  She explained numbness from back down lateral thigh from degenerative disc disease  Then pain begins around knee  Worse with movement, seemed agitated that nursing staff moved her position this morning and increased lower leg pain  Patient is slow to respond, however thought process is linear  Cooperative, and became frustrated when she was able to form the words for certain responses  She does remember falling Friday, unable to explain fall or events since  Noted that her lips and mouth were "still numb" which made it difficult to speak   Possesses oral hyperactivity, similar in appearance to tardive dyskinesia  Objective:     Vitals:   Temp (24hrs), Av 7 °F (36 5 °C), Min:97 4 °F (36 3 °C), Max:98 °F (36 7 °C)    Temp:  [97 4 °F (36 3 °C)-98 °F (36 7 °C)] 97 4 °F (36 3 °C)  HR:  [] 97  Resp:  [18] 18  BP: (134-136)/(85-86) 136/86  SpO2:  [91 %-95 %] 91 %  Body mass index is 22 99 kg/m²  Input and Output Summary (last 24 hours): Intake/Output Summary (Last 24 hours) at 2022 1058  Last data filed at 2022 7781  Gross per 24 hour   Intake 1400 ml   Output 900 ml   Net 500 ml       Physical Exam:     Physical Exam  Constitutional:       General: She is not in acute distress  Appearance: Normal appearance  HENT:      Head: Normocephalic and atraumatic  Eyes:      General: No scleral icterus  Cardiovascular:      Rate and Rhythm: Normal rate and regular rhythm  Pulses: Normal pulses  Heart sounds: Normal heart sounds  No murmur heard  No friction rub  No gallop  Pulmonary:      Effort: Pulmonary effort is normal  No respiratory distress  Breath sounds: Normal breath sounds  No wheezing, rhonchi or rales  Chest:      Chest wall: No tenderness  Abdominal:      General: Abdomen is flat  Bowel sounds are normal  There is no distension  Palpations: Abdomen is soft  Tenderness: There is no abdominal tenderness  There is no guarding  Musculoskeletal:         General: Swelling present  Right lower leg: Edema present  Comments: Right lower extremity edema  Tender to palpation below knee  Skin:     General: Skin is warm and dry  Comments: Bruises/cuts in various stages of healing on lip and knee  Neurological:      Mental Status: She is alert  Psychiatric:         Mood and Affect: Affect is tearful  Speech: Speech is delayed  Behavior: Behavior is hyperactive  Behavior is cooperative             Historical Information   Past Medical History:   Diagnosis Date   • Chronic pain disorder    • Depression    • GERD (gastroesophageal reflux disease)    • History of electroconvulsive therapy    • Low back pain    • Self-injurious behavior    • Suicide attempt [facility])      Past Surgical History:   Procedure Laterality Date   •  SECTION     • COLONOSCOPY     • PANCREAS SURGERY      "pseudocysts" per patient's  Ricki Infante   • IN ESOPHAGOGASTRODUODENOSCOPY TRANSORAL DIAGNOSTIC N/A 4/10/2018    Procedure: EGD AND COLONOSCOPY;  Surgeon: Mariya Gomez MD;  [place] [facility];   Service: Gastroenterology     Social History   Social History     Substance and Sexual Activity   Alcohol Use Yes    Comment: "occasional glass of wine"     Social History     Substance and Sexual Activity   Drug Use Not Currently   • Types: Marijuana, Cocaine    Comment: medical     Social History     Tobacco Use   Smoking Status Current Every Day Smoker   • Packs/day: 0 50   • Years: 35 00   • Pack years: 17 50   • Types: Cigarettes   Smokeless Tobacco Never Used     Meds/Allergies   all medications and allergies reviewed  Allergies   Allergen Reactions   • Chantix [Varenicline]    • Ibuprofen Other (See Comments)     Upset stomach   • Lyrica [Pregabalin] Other (See Comments)     bruising   • Penicillins Other (See Comments)     ? hives   • Sulfa Antibiotics Other (See Comments)     sloughing skin in mouth   • Sulfasalazine        Additional Data:     Labs:    Results from last 7 days   Lab Units 22  0550 22  0829   WBC Thousand/uL 5 97 9 38   HEMOGLOBIN g/dL 12 2 12 6   HEMATOCRIT % 37 7 38 4   PLATELETS Thousands/uL 329 397*   NEUTROS PCT %  --  85*   LYMPHS PCT %  --  10*   MONOS PCT %  --  4   EOS PCT %  --  0     Results from last 7 days   Lab Units 22  0905 22  0550   SODIUM mmol/L 132*  --    POTASSIUM mmol/L 5 3  --    CHLORIDE mmol/L 98  --    CO2 mmol/L 25  --    BUN mg/dL 52*  --    CREATININE mg/dL 6 72*  --    ANION GAP mmol/L 9  --    CALCIUM mg/dL 8 3  -- ALBUMIN g/dL  --  1 6*   TOTAL BILIRUBIN mg/dL  --  0 30   ALK PHOS U/L  --  73   ALT U/L  --  407*   AST U/L  --  489*   GLUCOSE RANDOM mg/dL 92  --      Results from last 7 days   Lab Units 09/19/22  1047   INR  1 11     Results from last 7 days   Lab Units 09/20/22  1154   POC GLUCOSE mg/dl 90         Results from last 7 days   Lab Units 09/20/22  0606 09/19/22  1222   LACTIC ACID mmol/L  --  0 4*   PROCALCITONIN ng/ml 0 31*  --          * I Have Reviewed All Lab Data Listed Above  * Additional Pertinent Lab Tests Reviewed:  Matilde 66 Admission Reviewed    Imaging:    Imaging Reports Reviewed Today Include: US RUQ with liver dopplers  Recent Cultures (last 7 days):           Last 24 Hours Medication List:   Current Facility-Administered Medications   Medication Dose Route Frequency Provider Last Rate   • acetaminophen  650 mg Oral Q6H PRN Luz Maria Ospina DO     • heparin (porcine)  3-30 Units/kg/hr (Order-Specific) Intravenous Titrated Rajwinder Hall DO 21 Units/kg/hr (09/21/22 0809)   • heparin (porcine)  2,400 Units Intravenous Q1H PRN Rajwinder Hall DO     • heparin (porcine)  4,800 Units Intravenous Q1H PRN Rajwinder Hall DO     • nicotine  1 patch Transdermal Daily Atul Adan DO     • ondansetron  4 mg Intravenous Q4H PRN Luz Maria Ospina DO     • QUEtiapine  25 mg Oral HS Atul Adan DO     • sodium chloride  125 mL/hr Intravenous Continuous Marigene Root, MIGUELITONP 125 mL/hr (09/21/22 0715)   • venlafaxine  150 mg Oral Daily Luz Maria Ospina DO          Today, Patient Was Seen By: RENETTA Inman

## 2022-09-21 NOTE — CASE MANAGEMENT
Case Management ED Progress Note    Patient name Jojo Sloan  Location [facility] 2 Mohamud Reyes* MRN [id number]  : 1970 Date 2022        OBJECTIVE:  Predictive Model Details         6% Factor Value    Risk of Hospital Admission or ED Visit Model Is in Relationship Yes     Has Medicaid Yes     Number of Hospitalizations 1     Has COPD Yes     Has Depression Yes     Has PCP Yes            Chief Complaint: Encephalopathy acute     Patient Class: Inpatient  Preferred Pharmacy:   [facility] [address], [place] [address]  Phone: [phone] Fax: [phone], [address] [facility]  [address]  Phone: [phone] Fax: [phone]    Primary Care Provider: Lei Bhandari MD    Primary Insurance: BLUE CROSS  Secondary Insurance:     ED Progress Note:      Referral to local SNF providers via Christine Cid

## 2022-09-21 NOTE — PROGRESS NOTES
Progress Note - Nephrology   Normrhoda Pascual 45 y o  female MRN: [id number]  Unit/Bed#: [place] [address] -01 Encounter: [id number]      Assessment/Plan    1) ANUJA (POA)  -Baseline creatinine: 0 7-1 0  -Creatinine on admission 5 88, most recent creatinine 6 72   -Etiology: Suspect due to pigment nephropathy/rhabdomyolysis  -UA: large blood, 0-1 RBCs, no glucose, no ketones, trace protein,  -Renal imaging: CT abdomen pelvis upon admission-mild nonspecific bilateral perinephric fat stranding, no hydronephrosis or renal calculi   -received 1 L normal saline on admission & started on isotonic saline 125 mL an hour   -On abel cath  UOP: 900 ml, I/O since admission + 4 2l   -Does not look volume overloaded  Plan  -Monitor daily volume status   -critical intake and output monitoring   -continue IV fluids isotonic saline at 75 mL/hour   -Start lasix if volume overloaded  -Trend BMP  -Avoid hypotension (MAP>65), avoid nephrotoxins, avoid NSAIDS      2)Hyponatremia  -sodium 126 on admission, baseline appears normal, most recently 132,  -suspect due to ANUJA, was on Effexor as outpatient   -workup urine sodium 22, urine osmolality 208, a m  Cortisol 20, serum osmolality 291, given normal serum osmolality consider paraproteinemia workup  Plan  -monitor with volume repletion, continue IV fluids   -goal correction of no greater than 8 mEq over 24  -continue trending BMP     3)Rhabdomyolysis  -history of recent fall/period of immobility  -CK trending down  32,634 on admission, now [id number]  Plan  -trend urine output   -continue IV fluids     4)Hyperkalemia  -potassium 5 4 on admission in the setting of ANUJA, most recently 5 3  Plan  -low-potassium diet when able to tolerate p o   -monitor with IV fluids  -Monitor BMP     5)Anion gap acidosis  -anion gap 15, bicarbonate 24 on admission,   -most recently anion gap, serum bicarbonate (25) normal   -lactic acid 0 4   -suspect due to ANUJA       6)Transaminitis  -AST greater than 1000, ALT elevated, alk-phos is normal, T bili normal on admission  Currently trending down    -suspect 2nd to rhabdomyolysis/elevated CK  -care per primary team  -CT abdomen pelvis with unremarkable liver      7)Encephalopathy  -CT head with no acute intracranial process   -care per primary  -TSH normal      8)Hypoglycemia  -glucose 56 on admission, with repeated episodes of hypoglycemia overnight   -care per primary     9)Elevated D-dimer  -D-dimer 3 84   -on heparin drip   -care per primary      10)Chronic pain   -on gabapentin 300 mg q i d   -would avoid that dose in the setting of ANUJA, max recommended dose 300 mg daily      Addition medical problems:  Chronic pain on gabapentin, depression, GERD    SUBJECTIVE:  Patient is still confused  But orient to TPP  No acute complaints  Complains of pain in right lower limb  No fever, chest pain, palpitations, SOB, dizziness, lightheadedness, nausea or vomiting  Patient is not volume overloaded         Vitals: Blood pressure 136/86, pulse 97, temperature (!) 97 4 °F (36 3 °C), resp  rate 18, height 5' 6" (1 676 m), weight 64 6 kg (142 lb 6 7 oz), last menstrual period 03/14/2019, SpO2 91 %  ,Body mass index is 22 99 kg/m²  Weight (last 2 days)     Date/Time Weight    09/21/22 0541 64 6 (142 42)    09/19/22 1308 62 6 (138 01)    09/19/22 0810 62 6 (138 01)            Intake/Output Summary (Last 24 hours) at 9/21/2022 0820  Last data filed at 9/21/2022 0527  Gross per 24 hour   Intake 1400 ml   Output 900 ml   Net 500 ml       Urethral Catheter 16 Fr   (Active)   Reasons to continue Urinary Catheter  Accurate I&O assessment in critically ill patients (48 hr  max) 09/21/22 0000   Goal for Removal Remove after 48 hrs of I/O monitoring 09/21/22 0000   Site Assessment Clean;Skin intact 09/21/22 0000   [facility] Care Done 09/20/22 2100   Collection Container Standard drainage bag 09/21/22 0000   Securement Method Securing device (Describe) 09/21/22 0000   Output (mL) 250 mL 09/21/22 7505       Physical Exam:   Vitals and nursing note reviewed  Constitutional:       General: She is not in acute distress  Appearance: Normal appearance  She is not toxic-appearing or diaphoretic  HENT:      Head: Normocephalic  Comments: Abrasions on face in various stages of healing  Nose: Nose normal       Mouth: Mucous membranes are dry  Eyes:      General: No scleral icterus  Cardiovascular:      Rate and Rhythm: Normal rate and regular rhythm  Pulses: Normal pulses  Heart sounds: Normal heart sounds  Pulmonary:      Effort: Pulmonary effort is normal  No respiratory distress  Breath sounds: Normal breath sounds  No wheezing  Abdominal:      General: Abdomen is flat  There is no distension  Palpations: Abdomen is soft  Tenderness: There is no abdominal tenderness  Musculoskeletal:      Cervical back: Neck supple  Right lower leg: swollen below knee  Left lower leg: No edema  Skin:     General: Skin is warm and dry  Capillary Refill: Capillary refill takes less than 2 seconds  Neurological:      Mental Status: She is alert  She is confused  Volume status: acceptable  Does not look volume overloaded       Lab, Imaging and other studies:   CBC:   Lab Results   Component Value Date    WBC 5 97 09/21/2022    HGB 12 2 09/21/2022    HCT 37 7 09/21/2022     (H) 09/21/2022     09/21/2022    MCH 32 9 09/21/2022    MCHC 32 4 09/21/2022    RDW 13 2 09/21/2022    MPV 9 0 09/21/2022     CMP:   Lab Results   Component Value Date    SODIUM 129 (L) 09/20/2022    K 4 6 09/20/2022    CL 97 09/20/2022    CO2 23 09/20/2022    BUN 52 (H) 09/20/2022    CREATININE 6 53 (H) 09/20/2022    CALCIUM 7 8 (L) 09/20/2022    EGFR 6 09/20/2022     Phosphorus:   Lab Results   Component Value Date    PHOS 7 2 (H) 09/21/2022

## 2022-09-22 PROBLEM — R79.89 D-DIMER, ELEVATED: Status: ACTIVE | Noted: 2022-09-22

## 2022-09-22 LAB
ANION GAP SERPL CALCULATED.3IONS-SCNC: 11 MMOL/L (ref 4–13)
ANION GAP SERPL CALCULATED.3IONS-SCNC: 13 MMOL/L (ref 4–13)
APTT PPP: 79 SECONDS (ref 23–37)
BUN SERPL-MCNC: 51 MG/DL (ref 5–25)
BUN SERPL-MCNC: 52 MG/DL (ref 5–25)
CALCIUM SERPL-MCNC: 8.2 MG/DL (ref 8.3–10.1)
CALCIUM SERPL-MCNC: 8.5 MG/DL (ref 8.3–10.1)
CHLORIDE SERPL-SCNC: 102 MMOL/L (ref 96–108)
CHLORIDE SERPL-SCNC: 104 MMOL/L (ref 96–108)
CK MB SERPL-MCNC: 2.2 % (ref 0–2.5)
CK MB SERPL-MCNC: 53.7 NG/ML (ref 0–5)
CK SERPL-CCNC: 2468 U/L (ref 26–192)
CO2 SERPL-SCNC: 20 MMOL/L (ref 21–32)
CO2 SERPL-SCNC: 21 MMOL/L (ref 21–32)
CREAT SERPL-MCNC: 6.81 MG/DL (ref 0.6–1.3)
CREAT SERPL-MCNC: 7.1 MG/DL (ref 0.6–1.3)
GFR SERPL CREATININE-BSD FRML MDRD: 5 ML/MIN/1.73SQ M
GFR SERPL CREATININE-BSD FRML MDRD: 6 ML/MIN/1.73SQ M
GLUCOSE SERPL-MCNC: 75 MG/DL (ref 65–140)
GLUCOSE SERPL-MCNC: 79 MG/DL (ref 65–140)
POTASSIUM SERPL-SCNC: 4.6 MMOL/L (ref 3.5–5.3)
POTASSIUM SERPL-SCNC: 4.7 MMOL/L (ref 3.5–5.3)
SODIUM SERPL-SCNC: 135 MMOL/L (ref 135–147)
SODIUM SERPL-SCNC: 136 MMOL/L (ref 135–147)

## 2022-09-22 RX ORDER — LORAZEPAM 2 MG/ML
1 INJECTION INTRAMUSCULAR
Status: DISPENSED | OUTPATIENT
Start: 2022-09-23 | End: 2022-09-23

## 2022-09-22 RX ORDER — HEPARIN SODIUM 5000 [USP'U]/ML
5000 INJECTION, SOLUTION INTRAVENOUS; SUBCUTANEOUS EVERY 8 HOURS SCHEDULED
Status: DISCONTINUED | OUTPATIENT
Start: 2022-09-22 | End: 2022-09-22

## 2022-09-22 RX ADMIN — Medication 1 PATCH: at 08:20

## 2022-09-22 RX ADMIN — VENLAFAXINE HYDROCHLORIDE 150 MG: 150 CAPSULE, EXTENDED RELEASE ORAL at 08:18

## 2022-09-22 RX ADMIN — HEPARIN SODIUM 5000 UNITS: 5000 INJECTION INTRAVENOUS; SUBCUTANEOUS at 13:56

## 2022-09-22 RX ADMIN — HEPARIN SODIUM 21 UNITS/KG/HR: 10000 INJECTION, SOLUTION INTRAVENOUS at 03:54

## 2022-09-22 RX ADMIN — BNT162B2 ORIGINAL AND OMICRON BA.4/BA.5 0.3 ML: .1125; .1125 INJECTION, SUSPENSION INTRAMUSCULAR at 18:18

## 2022-09-22 RX ADMIN — QUETIAPINE FUMARATE 25 MG: 25 TABLET ORAL at 22:52

## 2022-09-22 RX ADMIN — ACETAMINOPHEN 650 MG: 325 TABLET, FILM COATED ORAL at 10:40

## 2022-09-22 RX ADMIN — SODIUM CHLORIDE 125 ML/HR: 0.9 INJECTION, SOLUTION INTRAVENOUS at 13:12

## 2022-09-22 RX ADMIN — SODIUM CHLORIDE 125 ML/HR: 0.9 INJECTION, SOLUTION INTRAVENOUS at 05:37

## 2022-09-22 NOTE — ASSESSMENT & PLAN NOTE
· Acute encephalopathy appears secondary to gabapentin and morphine with subsequent renal failure  · Also had hypoglycemia requiring D50  No further hypoglycemia  · Continue holding gabapentin and morphine for now      · Unfortunately given persistent encephalopathy consulted neurology and checking MRI of brain

## 2022-09-22 NOTE — APP STUDENT NOTE
MARE STUDENT  Inpatient Progress Note for TRAINING ONLY  Not Part of Legal Medical Record     Progress Note - Bren Hammond 45 y o  female MRN: [id number]  Unit/Bed#: [facility] 2 -01 Encounter: [id number]      Assessment/Plan:  1  Encephalopathy, acute  · Appears to be secondary to gabapentin and morphine with renal failure  · Hold both gabapentin and morphine  · Cortisol normal   · EBV panel shows positive IgM, EVVN, and negative IgG  Not current infection  · HSV, CMV pending  · AOx4 on 9/20, patient recalls fall and is aware of situation  · Speech therapy consulted, placed on Level 3 dental soft diet, with thin liquids  2  Traumatic rhabdomyolysis  · Following fall on Friday  · Swollen right lower extremity  · Discontinue heparin infusion  Start prophylaxis dose  · CK continues to decrease 5,033 from admission level of 32,634  Continue IV fluids per nephrology and trending CK     3  Transaminitis  · Secondary to rhabdomyolysis  · No evidence of liver injury on CT abdomen wo contrast    · AST::407 continues to decrease  · US right upper quadrant shows no liver damage  GI consulted  4  Acute renal failure  · Secondary to rhabdomyolysis  No evidence of renal obstruction on imaging  · Continue IV fluids, isotonic saline 74mL/hour  · Nephrology consulted  · Cr continues to increase to 6 72  Admission level of 5 88   5  Abnormal CT of chest  · Opacities noted in left lung  · Procalcitonin elevated at 0 31  Consider elevation due to decreased organ perfusion  · Negative for COVID, influenza, RSV  6  GERD  · Hold PPI until renal injury resolved  7  Degenerative disc disease, lumbosacral  · Lumbar radiculopathy, surgery coordinated  · Prior to admission on gabapentin, morphine IR 15mg  Hold for kidney injury  · Confirmed on PDMP  8  Tobacco abuse  · Nicotine patch while inpatient     9  Depression  · Continue venlafaxine (Effexor) and quetiapine (Seroquel)  · Tearful today      VTE Pharmacologic Prophylaxis:   Pharmacologic: Heparin  Mechanical VTE Prophylaxis in Place: No    Patient Centered Rounds: I have performed bedside rounds with nursing staff today  Discussions with Specialists or Other Care Team Provider: Discussed plan of care with Dr Geovanny Mckinnon DO    Time Spent for Care: 30 minutes  More than 50% of total time spent on counseling and coordination of care as described above  Current Length of Stay: 3 day(s)    Current Patient Status: Inpatient   Certification Statement: The patient will continue to require additional inpatient hospital stay due to encephalopathy    Discharge Plan: Anticipated discharge >72 hours to rehab facility  Code Status: Level 1 - Full Code    Subjective:   Noelle Crocker is a 46yo female with PMH of depression, degenerative disc disease, on hospital day #3 after admission for metabolic encephalopathy following a non-witnessed fall Friday with successive  reduced movement over the weekend  Patient is seen Sahil Lawler with head of bed elevated  Patient is agitated and tearful  Repeatedly asks "you ask everyone these questions"  She became progressively uncooperative and tearful throughout the exam  Pain in right lower leg persists  Objective:     Vitals:   Temp (24hrs), Av 9 °F (36 6 °C), Min:97 7 °F (36 5 °C), Max:98 °F (36 7 °C)    Temp:  [97 7 °F (36 5 °C)-98 °F (36 7 °C)] 97 7 °F (36 5 °C)  HR:  [] 102  Resp:  [15-18] 15  BP: (136-145)/(86-89) 139/88  SpO2:  [94 %-95 %] 94 %  Body mass index is 23 66 kg/m²  Input and Output Summary (last 24 hours): Intake/Output Summary (Last 24 hours) at 2022 1058  Last data filed at 2022 0541  Gross per 24 hour   Intake 2140 9 ml   Output 900 ml   Net 1240 9 ml       Physical Exam:     Physical Exam  Constitutional:       General: She is not in acute distress  Appearance: She is obese  HENT:      Head: Normocephalic and atraumatic     Cardiovascular:      Rate and Rhythm: Normal rate and regular rhythm  Pulses: Normal pulses  Pulmonary:      Effort: Pulmonary effort is normal  No respiratory distress  Neurological:      Mental Status: She is alert  Psychiatric:         Mood and Affect: Affect is tearful  Behavior: Behavior is uncooperative, slowed and withdrawn  Historical Information   Past Medical History:   Diagnosis Date   • Chronic pain disorder    • Depression    • GERD (gastroesophageal reflux disease)    • History of electroconvulsive therapy    • Low back pain    • Self-injurious behavior    • Suicide attempt [facility])      Past Surgical History:   Procedure Laterality Date   •  SECTION     • COLONOSCOPY     • PANCREAS SURGERY      "pseudocysts" per patient's  Ricki Infante   • [place] ESOPHAGOGASTRODUODENOSCOPY TRANSORAL DIAGNOSTIC N/A 4/10/2018    Procedure: EGD AND COLONOSCOPY;  Surgeon: Faustino Rodriguez MD;  [place] [facility];   Service: Gastroenterology     Social History   Social History     Substance and Sexual Activity   Alcohol Use Yes    Comment: "occasional glass of wine"     Social History     Substance and Sexual Activity   Drug Use Not Currently   • Types: Marijuana, Cocaine    Comment: medical     Social History     Tobacco Use   Smoking Status Current Every Day Smoker   • Packs/day: 0 50   • Years: 35 00   • Pack years: 17 50   • Types: Cigarettes   Smokeless Tobacco Never Used     Meds/Allergies   all medications and allergies reviewed  Allergies   Allergen Reactions   • Chantix [Varenicline]    • Ibuprofen Other (See Comments)     Upset stomach   • Lyrica [Pregabalin] Other (See Comments)     bruising   • Penicillins Other (See Comments)     ? hives   • Sulfa Antibiotics Other (See Comments)     sloughing skin in mouth   • Sulfasalazine        Additional Data:     Labs:    Results from last 7 days   Lab Units 22  0550 22  0829   WBC Thousand/uL 5 97 9 38   HEMOGLOBIN g/dL 12 2 12 6   HEMATOCRIT % 37 7 38 4   PLATELETS Thousands/uL 329 397*   NEUTROS PCT %  --  85*   LYMPHS PCT %  --  10*   MONOS PCT %  --  4   EOS PCT %  --  0     Results from last 7 days   Lab Units 09/22/22  0756 09/21/22  0905 09/21/22  0550   SODIUM mmol/L 136   < >  --    POTASSIUM mmol/L 4 7   < >  --    CHLORIDE mmol/L 102   < >  --    CO2 mmol/L 21   < >  --    BUN mg/dL 51*   < >  --    CREATININE mg/dL 6 81*   < >  --    ANION GAP mmol/L 13   < >  --    CALCIUM mg/dL 8 2*   < >  --    ALBUMIN g/dL  --   --  1 6*   TOTAL BILIRUBIN mg/dL  --   --  0 30   ALK PHOS U/L  --   --  73   ALT U/L  --   --  407*   AST U/L  --   --  489*   GLUCOSE RANDOM mg/dL 75   < >  --     < > = values in this interval not displayed  Results from last 7 days   Lab Units 09/19/22  1047   INR  1 11     Results from last 7 days   Lab Units 09/20/22  1154   POC GLUCOSE mg/dl 90         Results from last 7 days   Lab Units 09/20/22  0606 09/19/22  1222   LACTIC ACID mmol/L  --  0 4*   PROCALCITONIN ng/ml 0 31*  --          * I Have Reviewed All Lab Data Listed Above  * Additional Pertinent Lab Tests Reviewed: All Labs Within Last 24 Hours Reviewed    Imaging:    Imaging Reports Reviewed Today Include:  None       Recent Cultures (last 7 days):           Last 24 Hours Medication List:   Current Facility-Administered Medications   Medication Dose Route Frequency Provider Last Rate   • acetaminophen  650 mg Oral Q6H PRN Kristin Gomez, DO     • heparin (porcine)  5,000 Units Subcutaneous [facility] Kristin Gomez, DO     • nicotine  1 patch Transdermal Daily Atul Adan DO     • ondansetron  4 mg Intravenous Q4H PRN Kristin Lockee, DO     • QUEtiapine  25 mg Oral HS Atul Adan DO     • sodium chloride  125 mL/hr Intravenous Continuous CARLITOS Hinson 125 mL/hr (09/22/22 0541)   • venlafaxine  150 mg Oral Daily Kristin Gomez DO          Today, Patient Was Seen By: RENETTA Rouse

## 2022-09-22 NOTE — PROGRESS NOTES
Progress Note - Nephrology   Daniele Braun 45 y o  female MRN: [id number]  Unit/Bed#: [place] [address] -01 Encounter: [id number]      Assessment/Plan    1) ANUJA (POA)  -Baseline creatinine: 0 7-1 0  -Creatinine on admission 5 88, most recent creatinine 6 81   -Etiology: Suspect due to pigment nephropathy/rhabdomyolysis  -UA: large blood, 0-1 RBCs, no glucose, no ketones, trace protein,  -Renal imaging: CT abdomen pelvis upon admission-mild nonspecific bilateral perinephric fat stranding, no hydronephrosis or renal calculi   -received 1 L normal saline on admission & started on isotonic saline 125 mL an hour   -On abel cath  UOP: 900 ml, I/O since admission + 5 48l   -Does not look volume overloaded  Plan  -Monitor daily volume status   -critical intake and output monitoring   -continue IV fluids isotonic saline at 75 mL/hour   -Start lasix if volume overloaded  -Trend BMP  -Avoid hypotension (MAP>65), avoid nephrotoxins, avoid NSAIDS      2)Hyponatremia  -sodium 126 on admission, baseline appears normal, most recently 136,  -suspect due to ANUJA, was on Effexor as outpatient   -workup urine sodium 22, urine osmolality 208, a m  Cortisol 20, serum osmolality 291, given normal serum osmolality consider paraproteinemia workup  Plan  -monitor with volume repletion, continue IV fluids   -goal correction of no greater than 8 mEq over 24  -continue trending BMP     3)Rhabdomyolysis  -history of recent fall/period of immobility  -CK trending down  32,634 on admission, now [id number]  Plan  -trend urine output   -continue IV fluids     4)Hyperkalemia  -potassium 5 4 on admission in the setting of ANUJA, most recently 4 7  Plan  -low-potassium diet when able to tolerate p o   -monitor with IV fluids  -Monitor BMP     5)Anion gap acidosis  -anion gap 15, bicarbonate 24 on admission,   -most recently anion gap normal, serum bicarbonate:21   -lactic acid 0 4   -suspect due to ANUJA       6)Transaminitis  -AST greater than 1000, ALT elevated, alk-phos is normal, T bili normal on admission  Currently trending down    -suspect 2nd to rhabdomyolysis/elevated CK  -care per primary team  -CT abdomen pelvis with unremarkable liver      7)Encephalopathy  -CT head with no acute intracranial process   -care per primary  -TSH normal      8)Hypoglycemia  -glucose 56 on admission, with repeated episodes of hypoglycemia overnight   -care per primary     9)Elevated D-dimer  -D-dimer 3 84   -on heparin drip   -care per primary      10)Chronic pain   -on gabapentin 300 mg q i d   -would avoid that dose in the setting of ANUJA, max recommended dose 300 mg daily      Addition medical problems:  Chronic pain on gabapentin, depression, GERD    SUBJECTIVE:  Patient is still confused  But orient to TPP  No acute complaints  Complains of pain in right lower limb  No fever, chest pain, palpitations, SOB, dizziness, lightheadedness, nausea or vomiting  Patient is not volume overloaded         Vitals: Blood pressure 139/88, pulse 102, temperature 97 7 °F (36 5 °C), resp  rate 15, height 5' 6" (1 676 m), weight 66 5 kg (146 lb 9 7 oz), last menstrual period 03/14/2019, SpO2 94 %  ,Body mass index is 23 66 kg/m²  Weight (last 2 days)     Date/Time Weight    09/22/22 0600 66 5 (146 61)    09/21/22 0541 64 6 (142 42)            Intake/Output Summary (Last 24 hours) at 9/22/2022 0857  Last data filed at 9/22/2022 0541  Gross per 24 hour   Intake 2140 9 ml   Output 900 ml   Net 1240 9 ml       Urethral Catheter 16 Fr   (Active)   Reasons to continue Urinary Catheter  Accurate I&O assessment in critically ill patients (48 hr  max) 09/21/22 0000   Goal for Removal Remove after 48 hrs of I/O monitoring 09/21/22 0000   Site Assessment Clean;Skin intact 09/21/22 0000   [facility] Done 09/20/22 2100   Collection Container Standard drainage bag 09/21/22 0000   Securement Method Securing device (Describe) 09/21/22 0000   Output (mL) 250 mL 09/21/22 0525       Physical Exam: Vitals and nursing note reviewed  Constitutional:       General: She is not in acute distress  Appearance: Normal appearance  She is not toxic-appearing or diaphoretic  HENT:      Head: Normocephalic  Comments: Abrasions on face in various stages of healing  Nose: Nose normal       Mouth: Mucous membranes are moist    Eyes:      General: No scleral icterus  Cardiovascular:      Rate and Rhythm: Normal rate and regular rhythm  Pulses: Normal pulses  Heart sounds: Normal heart sounds  Pulmonary:      Effort: Pulmonary effort is normal  No respiratory distress  Breath sounds: Normal breath sounds  No wheezing  Abdominal:      General: Abdomen is flat  There is no distension  Palpations: Abdomen is soft  Tenderness: There is no abdominal tenderness  Musculoskeletal:      Cervical back: Neck supple  Right lower leg: swollen below knee  Left lower leg: No edema  Skin:     General: Skin is warm and dry  Capillary Refill: Capillary refill takes less than 2 seconds  Neurological:      Mental Status: She is alert, but confused  Volume status: acceptable  Does not look volume overloaded       Lab, Imaging and other studies:   CMP:   Lab Results   Component Value Date    SODIUM 136 09/22/2022    K 4 7 09/22/2022     09/22/2022    CO2 21 09/22/2022    BUN 51 (H) 09/22/2022    CREATININE 6 81 (H) 09/22/2022    CALCIUM 8 2 (L) 09/22/2022    EGFR 6 09/22/2022

## 2022-09-22 NOTE — ASSESSMENT & PLAN NOTE
· ANUJA/ATN  · Secondary to rhabdomyolysis  No evidence of renal obstruction on imaging  · Appreciate nephrology evaluation  Awaiting renal recovery  Continue IV fluids      Results from last 7 days   Lab Units 09/22/22  0756 09/21/22  1415 09/21/22  0905 09/20/22  2326 09/20/22  1526 09/20/22  0909 09/19/22  2350 09/19/22  1708 09/19/22  1222   BUN mg/dL 51* 54* 52* 52* 54* 55* 53* 54* 53*   CREATININE mg/dL 6 81* 6 74* 6 72* 6 53* 6 64* 6 73* 6 21* 6 03* 5 82*   EGFR ml/min/1 73sq m 6 6 6 6 6 6 7 7 7

## 2022-09-22 NOTE — ASSESSMENT & PLAN NOTE
· Elevated D-dimer may be related to fall  · Lower extremity duplex negative for DVT  · Renal dysfunction cannot check CTA of chest but doubt PE    · Will discontinue heparin infusion and transition to prophylaxis dose

## 2022-09-22 NOTE — PROGRESS NOTES
[facility]  Progress Note - Chante Small 1970, 45 y o  female MRN: [id number]  Unit/Bed#: [place] [address] -01 Encounter: [id number]  Primary Care Provider: Luna Barthel, MD   Date and time admitted to hospital: 9/19/2022  8:04 AM    * Encephalopathy acute  Assessment & Plan  · Acute encephalopathy appears secondary to gabapentin and morphine with subsequent renal failure  · Also had hypoglycemia requiring D50  No further hypoglycemia  · Continue holding gabapentin and morphine for now  · Unfortunately given persistent encephalopathy consulted neurology and checking MRI of brain    ARF (acute renal failure) ([facility] [address] )  Assessment & Plan  · ANUJA/ATN  · Secondary to rhabdomyolysis  No evidence of renal obstruction on imaging  · Appreciate nephrology evaluation  Awaiting renal recovery  Continue IV fluids  Results from last 7 days   Lab Units 09/22/22  0756 09/21/22  1415 09/21/22  0905 09/20/22  2326 09/20/22  1526 09/20/22  0909 09/19/22  2350 09/19/22  1708 09/19/22  1222   BUN mg/dL 51* 54* 52* 52* 54* 55* 53* 54* 53*   CREATININE mg/dL 6 81* 6 74* 6 72* 6 53* 6 64* 6 73* 6 21* 6 03* 5 82*   EGFR ml/min/1 73sq m 6 6 6 6 6 6 7 7 7       Transaminitis  Assessment & Plan  · Secondary to rhabdomyolysis  No evidence of liver injury on CT imaging  · Seen by GI  Hepatic duplex also negative  Results from last 7 days   Lab Units 09/21/22  0550 09/20/22  0606 09/19/22  0829   AST U/L 489* 970* 1,683*   ALT U/L 407* 512* 674*   TOTAL BILIRUBIN mg/dL 0 30 0 35 0 58       D-dimer, elevated  Assessment & Plan  · Elevated D-dimer may be related to fall  · Lower extremity duplex negative for DVT  · Renal dysfunction cannot check CTA of chest but doubt PE  · Will discontinue heparin infusion and transition to prophylaxis dose    Traumatic rhabdomyolysis [facility])  Assessment & Plan  · Secondary to fall on Friday  IV fluids per Nephrology      Results from last 7 days   Lab Units 09/21/22  0550 09/20/22  0606 09/19/22  0829   CK TOTAL U/L 5,033* 14,147* 29,743*       Abnormal CT of the chest  Assessment & Plan  · Left-sided opacities noted  COVID negative  · Procalcitonin only minimally elevated doubt active infection    Results from last 7 days   Lab Units 09/20/22  0606   PROCALCITONIN ng/ml 0 31*       GERD (gastroesophageal reflux disease)  Assessment & Plan  · Holding PPI given renal injury    DDD (degenerative disc disease), lumbosacral  Assessment & Plan  · Lumbar radiculopathy due for surgery  · Prior to admission was on gabapentin and morphine IR 15 mg   · Confirmed on   Holding both for now given kidney injury  Depression  Assessment & Plan  · Continue venlafaxine and quetiapine  · Tearful at times      VTE Pharmacologic Prophylaxis:  Moderate Risk (Score 3-4) - Pharmacological DVT Prophylaxis Ordered: heparin  Patient Centered Rounds: I have performed bedside rounds with nursing staff today  Discussions with Specialists or Other Care Team Provider:  Nephrology    Education and Discussions with Family / Patient: Updated  () at bedside  Time Spent for Care: 25 mins  More than 50% of total time spent on counseling and coordination of care as described above  Current Length of Stay: 3 day(s)  Current Patient Status: Inpatient   Certification Statement: The patient will continue to require additional inpatient hospital stay due to renal dysfunction any for neurologic evaluation  Discharge Plan / Estimated Discharge Date: Anticipate discharge in >72 hrs to rehab facility  Code Status: Level 1 - Full Code      Subjective:   Patient seen and examined  Tearful, cannot say why  Objective:   Vitals: Blood pressure 139/88, pulse 102, temperature 97 7 °F (36 5 °C), resp  rate 15, height 5' 6" (1 676 m), weight 66 5 kg (146 lb 9 7 oz), last menstrual period 03/14/2019, SpO2 94 %      Intake/Output Summary (Last 24 hours) at 9/22/2022 0928  Last data filed at 9/22/2022 0541  Gross per 24 hour   Intake 2140 9 ml   Output 900 ml   Net 1240 9 ml       Physical Exam  Vitals reviewed  Constitutional:       General: She is not in acute distress  HENT:      Head: Atraumatic  Eyes:      Extraocular Movements: Extraocular movements intact  Cardiovascular:      Rate and Rhythm: Regular rhythm  Heart sounds: Normal heart sounds  Pulmonary:      Breath sounds: Normal breath sounds  No wheezing  Abdominal:      General: Bowel sounds are normal       Palpations: Abdomen is soft  Tenderness: There is no guarding or rebound  Musculoskeletal:         General: Tenderness (Right leg) present  No swelling  Skin:     General: Skin is warm  Neurological:      Mental Status: She is disoriented  Motor: Weakness (Right leg) present  Psychiatric:         Mood and Affect: Affect is tearful         Additional Data:   Labs:  Results from last 7 days   Lab Units 09/21/22  0550 09/19/22  1047 09/19/22  0829   WBC Thousand/uL 5 97  --  9 38   HEMOGLOBIN g/dL 12 2  --  12 6   PLATELETS Thousands/uL 329  --  397*   MCV fL 102*  --  101*   INR   --  1 11  --      Results from last 7 days   Lab Units 09/22/22  0756 09/21/22  1415 09/21/22  0905 09/21/22  0550 09/20/22  0909 09/20/22  0606 09/19/22  1222 09/19/22  0829   SODIUM mmol/L 136 133* 132*  --    < >  --    < > 126*   POTASSIUM mmol/L 4 7 5 4* 5 3  --    < >  --    < > 5 4*   CHLORIDE mmol/L 102 98 98  --    < >  --    < > 87*   CO2 mmol/L 21 24 25  --    < >  --    < > 24   ANION GAP mmol/L 13 11 9  --    < >  --    < > 15*   BUN mg/dL 51* 54* 52*  --    < >  --    < > 53*   CREATININE mg/dL 6 81* 6 74* 6 72*  --    < >  --    < > 5 88*   CALCIUM mg/dL 8 2* 8 3 8 3  --    < >  --    < > 7 8*   ALBUMIN g/dL  --   --   --  1 6*  --  1 8*  --  2 3*   TOTAL BILIRUBIN mg/dL  --   --   --  0 30  --  0 35  --  0 58   ALK PHOS U/L  --   --   --  73  --  79  --  98   ALT U/L  --   --   --  407*  --  512*  -- 674*   AST U/L  --   --   --  489*  --  970*  --  1,683*   EGFR ml/min/1 73sq m 6 6 6  --    < >  --    < > 7   GLUCOSE RANDOM mg/dL 75 86 92  --    < >  --    < > 56*    < > = values in this interval not displayed  Results from last 7 days   Lab Units 09/21/22  0550   PHOSPHORUS mg/dL 7 2*     Results from last 7 days   Lab Units 09/21/22  0550 09/20/22  0606 09/19/22  0829   CK TOTAL U/L 5,033* 14,147* 32,634*   CK MB INDEX % 1 7 1 2 1 1     Results from last 7 days   Lab Units 09/19/22  1220 09/19/22  1047 09/19/22  0829   HS TNI 0HR ng/L  --   --  80*   HS TNI 2HR ng/L  --  73*  --    HS TNI 4HR ng/L 84*  --   --           Results from last 7 days   Lab Units 09/20/22  0606 09/19/22  1222   LACTIC ACID mmol/L  --  0 4*   PROCALCITONIN ng/ml 0 31*  --      Results from last 7 days   Lab Units 09/20/22  1154   POC GLUCOSE mg/dl 90         Results from last 7 days   Lab Units 09/19/22  0829   TSH 3RD GENERATON uIU/mL 3 047     * I Have Reviewed All Lab Data Listed Above  Cultures:   Results from last 7 days   Lab Units 09/19/22  1401   INFLUENZA A PCR  Negative       Results from last 7 days   Lab Units 09/19/22  1401   SARS-COV-2  Negative   INFLUENZA A PCR  Negative   INFLUENZA B PCR  Negative   RSV PCR  Negative           Lines/Drains:  Invasive Devices  Report    Peripheral Intravenous Line  Duration           Peripheral IV 09/19/22 Left Forearm 2 days          Drain  Duration           Urethral Catheter 16 Fr  1 day              Telemetry:      Imaging:  Imaging Reports Reviewed Today Include:   CT chest abdomen pelvis wo contrast    Result Date: 9/19/2022  Impression: Bilateral groundglass opacities with superimposed inter and intralobular septal thickening  Differential diagnosis includes pulmonary hemorrhage, infection, and pulmonary edema, although the distribution is atypical for edema  The study was marked in [facility] for immediate notification   Workstation performed: [id number]     XR hip/pelv 2-3 vws right    Result Date: 9/19/2022  Impression: No acute osseous abnormality  Workstation performed: [id number]     XR ankle 3+ views RIGHT    Result Date: 9/19/2022  Impression: No acute osseous abnormality  Workstation performed: [id number]     XR foot 3+ views RIGHT    Result Date: 9/19/2022  Impression: No acute osseous abnormality  Workstation performed: [id number]     CT head without contrast    Result Date: 9/19/2022  Impression: No acute intracranial process  No skull fracture  Workstation performed: [id number]     CT spine cervical without contrast    Result Date: 9/19/2022  Impression: No cervical spine fracture or traumatic malalignment  Incidental finding of partially visualized subpleural groundglass opacity in the left pulmonary apex presumably scarring  Cannot exclude infiltrate  This study demonstrates a significant  finding and was documented as such in [facility] for liaison and referring practitioner notification  Workstation performed: [id number]     XR chest 1 view    Result Date: 9/19/2022  Impression: No acute cardiopulmonary disease  Findings are stable Workstation performed: [id number]     US right upper quadrant with liver dopplers    Result Date: 9/20/2022  Impression: 1  Minimal layering sludge without evidence of acute cholecystitis  2   Normal-appearing liver with normal liver Dopplers  Workstation performed: [id number]     CT lower extremity wo contrast right    Result Date: 9/19/2022  Impression: There is no evidence of intramuscular hematoma or other mass lesion in the right calf  Please note that rhabdomyolysis and compartment syndrome are clinical diagnoses, and are not excluded based on these imaging findings   Workstation performed: [id number]       Scheduled Meds:  Current Facility-Administered Medications   Medication Dose Route Frequency Provider Last Rate   • acetaminophen  650 mg Oral Q6H PRN Ronald Stade, DO     • heparin (porcine)  3-30 Units/kg/hr (Order-Specific) Intravenous Titrated Rajwinder Hall DO 21 Units/kg/hr (09/22/22 1078)   • heparin (porcine)  2,400 Units Intravenous Q1H PRN Rajwinder Hall DO     • heparin (porcine)  4,800 Units Intravenous Q1H PRN Danney Cooks, DO     • nicotine  1 patch Transdermal Daily Atul Adan DO     • ondansetron  4 mg Intravenous Q4H PRN Steve Miranda DO     • QUEtiapine  25 mg Oral HS Atul Adan DO     • sodium chloride  125 mL/hr Intravenous Continuous Kenna Emelyn, CRNP 125 mL/hr (09/22/22 0541)   • venlafaxine  150 mg Oral Daily Steve Miranda DO         Today, Patient Was Seen By: Steve Miranda DO    ** Please Note: Dictation voice to text software may have been used in the creation of this document   **

## 2022-09-22 NOTE — NURSING NOTE
Patient refusing blood work of ptt and bmp by both the aide and RN  On-call slim messaged and made aware  Will continue to monitor

## 2022-09-22 NOTE — PLAN OF CARE
Problem: PAIN - ADULT  Goal: Verbalizes/displays adequate comfort level or baseline comfort level  Description: Interventions:  - Encourage patient to monitor pain and request assistance  - Assess pain using appropriate pain scale  - Administer analgesics based on type and severity of pain and evaluate response  - Implement non-pharmacological measures as appropriate and evaluate response  - Consider cultural and social influences on pain and pain management  - Notify physician/advanced practitioner if interventions unsuccessful or patient reports new pain  Outcome: Progressing     Problem: INFECTION - ADULT  Goal: Absence or prevention of progression during hospitalization  Description: INTERVENTIONS:  - Assess and monitor for signs and symptoms of infection  - Monitor lab/diagnostic results  - Monitor all insertion sites, i e  indwelling lines, tubes, and drains  - Monitor endotracheal if appropriate and nasal secretions for changes in amount and color  - Anthony appropriate cooling/warming therapies per order  - Administer medications as ordered  - Instruct and encourage patient and family to use good hand hygiene technique  - Identify and instruct in appropriate isolation precautions for identified infection/condition  Outcome: Progressing  Goal: Absence of fever/infection during neutropenic period  Description: INTERVENTIONS:  - Monitor WBC    Outcome: Progressing     Problem: SAFETY ADULT  Goal: Patient will remain free of falls  Description: INTERVENTIONS:  - Educate patient/family on patient safety including physical limitations  - Instruct patient to call for assistance with activity   - Consult OT/PT to assist with strengthening/mobility   - Keep Call bell within reach  - Keep bed low and locked with side rails adjusted as appropriate  - Keep care items and personal belongings within reach  - Initiate and maintain comfort rounds  - Make Fall Risk Sign visible to staff  - Apply yellow socks and bracelet for high fall risk patients  - Consider moving patient to room near nurses station  Outcome: Progressing  Goal: Maintain or return to baseline ADL function  Description: INTERVENTIONS:  -  Assess patient's ability to carry out ADLs; assess patient's baseline for ADL function and identify physical deficits which impact ability to perform ADLs (bathing, care of mouth/teeth, toileting, grooming, dressing, etc )  - Assess/evaluate cause of self-care deficits   - Assess range of motion  - Assess patient's mobility; develop plan if impaired  - Assess patient's need for assistive devices and provide as appropriate  - Encourage maximum independence but intervene and supervise when necessary  - Involve family in performance of ADLs  - Assess for home care needs following discharge   - Consider OT consult to assist with ADL evaluation and planning for discharge  - Provide patient education as appropriate  Outcome: Progressing  Goal: Maintains/Returns to pre admission functional level  Description: INTERVENTIONS:  - Perform BMAT or MOVE assessment daily    - Set and communicate daily mobility goal to care team and patient/family/caregiver     - Collaborate with rehabilitation services on mobility goals if consulted  - Out of bed for toileting  - Record patient progress and toleration of activity level   Outcome: Progressing     Problem: DISCHARGE PLANNING  Goal: Discharge to home or other facility with appropriate resources  Description: INTERVENTIONS:  - Identify barriers to discharge w/patient and caregiver  - Arrange for needed discharge resources and transportation as appropriate  - Identify discharge learning needs (meds, wound care, etc )  - Arrange for interpretive services to assist at discharge as needed  - Refer to Case Management Department for coordinating discharge planning if the patient needs post-hospital services based on physician/advanced practitioner order or complex needs related to functional status, cognitive ability, or social support system  Outcome: Progressing     Problem: Knowledge Deficit  Goal: Patient/family/caregiver demonstrates understanding of disease process, treatment plan, medications, and discharge instructions  Description: Complete learning assessment and assess knowledge base    Interventions:  - Provide teaching at level of understanding  - Provide teaching via preferred learning methods  Outcome: Progressing     Problem: Potential for Falls  Goal: Patient will remain free of falls  Description: INTERVENTIONS:  - Educate patient/family on patient safety including physical limitations  - Instruct patient to call for assistance with activity   - Consult OT/PT to assist with strengthening/mobility   - Keep Call bell within reach  - Keep bed low and locked with side rails adjusted as appropriate  - Keep care items and personal belongings within reach  - Initiate and maintain comfort rounds  - Make Fall Risk Sign visible to staff  - Apply yellow socks and bracelet for high fall risk patients  - Consider moving patient to room near nurses station  Outcome: Progressing     Problem: MOBILITY - ADULT  Goal: Maintain or return to baseline ADL function  Description: INTERVENTIONS:  -  Assess patient's ability to carry out ADLs; assess patient's baseline for ADL function and identify physical deficits which impact ability to perform ADLs (bathing, care of mouth/teeth, toileting, grooming, dressing, etc )  - Assess/evaluate cause of self-care deficits   - Assess range of motion  - Assess patient's mobility; develop plan if impaired  - Assess patient's need for assistive devices and provide as appropriate  - Encourage maximum independence but intervene and supervise when necessary  - Involve family in performance of ADLs  - Assess for home care needs following discharge   - Consider OT consult to assist with ADL evaluation and planning for discharge  - Provide patient education as appropriate  Outcome: Progressing  Goal: Maintains/Returns to pre admission functional level  Description: INTERVENTIONS:  - Perform BMAT or MOVE assessment daily    - Set and communicate daily mobility goal to care team and patient/family/caregiver  - Collaborate with rehabilitation services on mobility goals if consulted  - Out of bed for toileting  - Record patient progress and toleration of activity level   Outcome: Progressing     Problem: Prexisting or High Potential for Compromised Skin Integrity  Goal: Skin integrity is maintained or improved  Description: INTERVENTIONS:  - Identify patients at risk for skin breakdown  - Assess and monitor skin integrity  - Assess and monitor nutrition and hydration status  - Monitor labs   - Assess for incontinence   - Turn and reposition patient  - Assist with mobility/ambulation  - Relieve pressure over bony prominences  - Avoid friction and shearing  - Provide appropriate hygiene as needed including keeping skin clean and dry  - Evaluate need for skin moisturizer/barrier cream  - Collaborate with interdisciplinary team   - Patient/family teaching  - Consider wound care consult   Outcome: Progressing     Problem: Nutrition/Hydration-ADULT  Goal: Nutrient/Hydration intake appropriate for improving, restoring or maintaining nutritional needs  Description: Monitor and assess patient's nutrition/hydration status for malnutrition  Collaborate with interdisciplinary team and initiate plan and interventions as ordered  Monitor patient's weight and dietary intake as ordered or per policy  Utilize nutrition screening tool and intervene as necessary  Determine patient's food preferences and provide high-protein, high-caloric foods as appropriate       INTERVENTIONS:  - Monitor oral intake, urinary output, labs, and treatment plans  - Assess nutrition and hydration status and recommend course of action  - Evaluate amount of meals eaten  - Assist patient with eating if necessary   - Allow adequate time for meals  - Recommend/ encourage appropriate diets, oral nutritional supplements, and vitamin/mineral supplements  - Order, calculate, and assess calorie counts as needed  - Recommend, monitor, and adjust tube feedings and TPN/PPN based on assessed needs  - Assess need for intravenous fluids  - Provide specific nutrition/hydration education as appropriate  - Include patient/family/caregiver in decisions related to nutrition  Outcome: Progressing

## 2022-09-22 NOTE — ASSESSMENT & PLAN NOTE
· Secondary to fall on Friday  IV fluids per Nephrology      Results from last 7 days   Lab Units 09/21/22  0550 09/20/22  0606 09/19/22  0829   CK TOTAL U/L 5,033* 14,147* 69,400*

## 2022-09-22 NOTE — CASE MANAGEMENT
Case Management Discharge Planning Note    Patient name Gretchen Simmons  Location [address] Nicole Jacob* MRN [id number]  : 1970 Date 2022       Current Admission Date: 2022  Current Admission Diagnosis:Encephalopathy acute   Patient Active Problem List    Diagnosis Date Noted   • D-dimer, elevated 2022   • ARF (acute renal failure) ([facility] [address] ) 2022   • Transaminitis 2022   • Encephalopathy acute 2022   • Abnormal CT of the chest 2022   • Traumatic rhabdomyolysis ([facility] [address] ) 2022   • Hyperglycemia 2022   • Opioid dependence ([facility] [address] ) 2021   • Chronic right shoulder pain 2021   • Internal hemorrhoids 2020   • Adnexal cyst 2020   • Ischemic colitis ([facility] [address] ) 2020   • GERD (gastroesophageal reflux disease)    • Intercostal neuralgia    • Hematochezia 2019   • Insomnia 2019   • History of rib fracture 10/28/2018   • Tobacco abuse 10/28/2018   • Right knee pain 2018   • Generalized anxiety disorder 2018   • Hypertension    • Hyperlipidemia    • Depression    • COPD (chronic obstructive pulmonary disease) ([facility] [address] )    • Chondromalacia patellae 2018   • Irritable bowel syndrome with diarrhea 2018   • DDD (degenerative disc disease), lumbosacral 2014      LOS (days): 3  Geometric Mean LOS (GMLOS) (days):   Days to GMLOS:     OBJECTIVE:  Risk of Unplanned Readmission Score: 23 11         Current admission status: Inpatient   Preferred Pharmacy:   [address] [place] [address]  Phone: [phone] Fax: [phone] - [phone], [address] [place] [address]  Phone: [phone] Fax: [phone]    Primary Care Provider: Sasha Yanes MD    Primary Insurance: BLUE CROSS  Secondary Insurance:     DISCHARGE DETAILS:    Discharge planning discussed with[de-identified] Spouse        Additional Comments: LSW covering case today  Printed list of SNF willing to accept pt [facility] 2021, 2029 and [facility]) and gave to spouse  Pt was crying and not able to answer questions  SNF are asking for COVID booster shot and spouse stated pt would be agreeable  MD was informed  [facility] are still reviewing case  SNF are questioning if pt needs to be option for sudcide attempt in past   CM will need to f/u

## 2022-09-22 NOTE — PLAN OF CARE
Problem: PAIN - ADULT  Goal: Verbalizes/displays adequate comfort level or baseline comfort level  Description: Interventions:  - Encourage patient to monitor pain and request assistance  - Assess pain using appropriate pain scale  - Administer analgesics based on type and severity of pain and evaluate response  - Implement non-pharmacological measures as appropriate and evaluate response  - Consider cultural and social influences on pain and pain management  - Notify physician/advanced practitioner if interventions unsuccessful or patient reports new pain  Outcome: Progressing     Problem: INFECTION - ADULT  Goal: Absence or prevention of progression during hospitalization  Description: INTERVENTIONS:  - Assess and monitor for signs and symptoms of infection  - Monitor lab/diagnostic results  - Monitor all insertion sites, i e  indwelling lines, tubes, and drains  - Monitor endotracheal if appropriate and nasal secretions for changes in amount and color  - Warren appropriate cooling/warming therapies per order  - Administer medications as ordered  - Instruct and encourage patient and family to use good hand hygiene technique  - Identify and instruct in appropriate isolation precautions for identified infection/condition  Outcome: Progressing  Goal: Absence of fever/infection during neutropenic period  Description: INTERVENTIONS:  - Monitor WBC    Outcome: Progressing     Problem: SAFETY ADULT  Goal: Patient will remain free of falls  Description: INTERVENTIONS:  - Educate patient/family on patient safety including physical limitations  - Instruct patient to call for assistance with activity   - Consult OT/PT to assist with strengthening/mobility   - Keep Call bell within reach  - Keep bed low and locked with side rails adjusted as appropriate  - Keep care items and personal belongings within reach  - Initiate and maintain comfort rounds  - Make Fall Risk Sign visible to staff  - Apply yellow socks and bracelet for high fall risk patients  - Consider moving patient to room near nurses station  Outcome: Progressing  Goal: Maintain or return to baseline ADL function  Description: INTERVENTIONS:  -  Assess patient's ability to carry out ADLs; assess patient's baseline for ADL function and identify physical deficits which impact ability to perform ADLs (bathing, care of mouth/teeth, toileting, grooming, dressing, etc )  - Assess/evaluate cause of self-care deficits   - Assess range of motion  - Assess patient's mobility; develop plan if impaired  - Assess patient's need for assistive devices and provide as appropriate  - Encourage maximum independence but intervene and supervise when necessary  - Involve family in performance of ADLs  - Assess for home care needs following discharge   - Consider OT consult to assist with ADL evaluation and planning for discharge  - Provide patient education as appropriate  Outcome: Progressing  Goal: Maintains/Returns to pre admission functional level  Description: INTERVENTIONS:  - Perform BMAT or MOVE assessment daily    - Set and communicate daily mobility goal to care team and patient/family/caregiver     - Collaborate with rehabilitation services on mobility goals if consulted  - Out of bed for toileting  - Record patient progress and toleration of activity level   Outcome: Progressing     Problem: DISCHARGE PLANNING  Goal: Discharge to home or other facility with appropriate resources  Description: INTERVENTIONS:  - Identify barriers to discharge w/patient and caregiver  - Arrange for needed discharge resources and transportation as appropriate  - Identify discharge learning needs (meds, wound care, etc )  - Arrange for interpretive services to assist at discharge as needed  - Refer to Case Management Department for coordinating discharge planning if the patient needs post-hospital services based on physician/advanced practitioner order or complex needs related to functional status, cognitive ability, or social support system  Outcome: Progressing     Problem: Knowledge Deficit  Goal: Patient/family/caregiver demonstrates understanding of disease process, treatment plan, medications, and discharge instructions  Description: Complete learning assessment and assess knowledge base    Interventions:  - Provide teaching at level of understanding  - Provide teaching via preferred learning methods  Outcome: Progressing     Problem: Potential for Falls  Goal: Patient will remain free of falls  Description: INTERVENTIONS:  - Educate patient/family on patient safety including physical limitations  - Instruct patient to call for assistance with activity   - Consult OT/PT to assist with strengthening/mobility   - Keep Call bell within reach  - Keep bed low and locked with side rails adjusted as appropriate  - Keep care items and personal belongings within reach  - Initiate and maintain comfort rounds  - Make Fall Risk Sign visible to staff  - Apply yellow socks and bracelet for high fall risk patients  - Consider moving patient to room near nurses station  Outcome: Progressing     Problem: MOBILITY - ADULT  Goal: Maintain or return to baseline ADL function  Description: INTERVENTIONS:  -  Assess patient's ability to carry out ADLs; assess patient's baseline for ADL function and identify physical deficits which impact ability to perform ADLs (bathing, care of mouth/teeth, toileting, grooming, dressing, etc )  - Assess/evaluate cause of self-care deficits   - Assess range of motion  - Assess patient's mobility; develop plan if impaired  - Assess patient's need for assistive devices and provide as appropriate  - Encourage maximum independence but intervene and supervise when necessary  - Involve family in performance of ADLs  - Assess for home care needs following discharge   - Consider OT consult to assist with ADL evaluation and planning for discharge  - Provide patient education as appropriate  Outcome: Progressing  Goal: Maintains/Returns to pre admission functional level  Description: INTERVENTIONS:  - Perform BMAT or MOVE assessment daily    - Set and communicate daily mobility goal to care team and patient/family/caregiver  - Collaborate with rehabilitation services on mobility goals if consulted  - Out of bed for toileting  - Record patient progress and toleration of activity level   Outcome: Progressing     Problem: Prexisting or High Potential for Compromised Skin Integrity  Goal: Skin integrity is maintained or improved  Description: INTERVENTIONS:  - Identify patients at risk for skin breakdown  - Assess and monitor skin integrity  - Assess and monitor nutrition and hydration status  - Monitor labs   - Assess for incontinence   - Turn and reposition patient  - Assist with mobility/ambulation  - Relieve pressure over bony prominences  - Avoid friction and shearing  - Provide appropriate hygiene as needed including keeping skin clean and dry  - Evaluate need for skin moisturizer/barrier cream  - Collaborate with interdisciplinary team   - Patient/family teaching  - Consider wound care consult   Outcome: Progressing     Problem: Nutrition/Hydration-ADULT  Goal: Nutrient/Hydration intake appropriate for improving, restoring or maintaining nutritional needs  Description: Monitor and assess patient's nutrition/hydration status for malnutrition  Collaborate with interdisciplinary team and initiate plan and interventions as ordered  Monitor patient's weight and dietary intake as ordered or per policy  Utilize nutrition screening tool and intervene as necessary  Determine patient's food preferences and provide high-protein, high-caloric foods as appropriate       INTERVENTIONS:  - Monitor oral intake, urinary output, labs, and treatment plans  - Assess nutrition and hydration status and recommend course of action  - Evaluate amount of meals eaten  - Assist patient with eating if necessary   - Allow adequate time for meals  - Recommend/ encourage appropriate diets, oral nutritional supplements, and vitamin/mineral supplements  - Order, calculate, and assess calorie counts as needed  - Recommend, monitor, and adjust tube feedings and TPN/PPN based on assessed needs  - Assess need for intravenous fluids  - Provide specific nutrition/hydration education as appropriate  - Include patient/family/caregiver in decisions related to nutrition  Outcome: Progressing

## 2022-09-22 NOTE — ASSESSMENT & PLAN NOTE
45 y o  female with chronic pain disorder with chronic opioid use, depression, anxiety, prior self-injurious behavior, prior suicide attempt, h/o electroconvulsive therapy, GERD, IBS, HTN, HLD, daily tobacco use who presented to the ED on 9/19/22 with alteration in mental status  Per report patient with recent fall 9/16 with head strike  She initially appeared okay, was not medically evaluated, but then was found to be more lethargic, sedentary with decreased PO intake  On admission she was found to have ATN, traumatic rhabdomyolysis (CK over 32,000), transaminitis and hyponatremia  Her home gabapentin and Morphine was held due to ANUJA and encephhalothy  Of note patient was switched from Nucynta to MSIR 15 mg 3 weeks ago due to chronic pain for which scheduled to have spine surgery next month  Neurology was asked to evaluate given persistent encephalopathy despite improvement in hyponatremia, rhabdomyolysis, and transaminitis improving  Slow improvement in ATN      Work-up:  · D-dimer 3 84, venous duplex negative for DVT  · Na 126-->137,glucose 56, BUN/Cr 53/5 88--> 51/6 98, AST 1683-->167, -->292  · Total CK 32,634-->1482, TSH WNL  · UA negative, UDS positive for opiates, COVID negative  · CTH negative for acute intracranial abnormality  · CT Cspine negative for spine fracture or traumatic malalignment  · CT CAP revealing bilateral ground-glass opacities with superimposed inter an intra lobular septal thickening  · Xrays of right ankle, right foot and right hip with no osseous abnormality  · MRI Brain with no evidence of recent infarct, midline shift  Study degraded by motion  · LP completed with opening pressure 26  CSF studies collected    Etiology unclear but would have expected more improvement if the etiology was due to metabolic issues   Continue neurologic work up awaiting CSF lab results     Plan:  - Routine EEG - patient refused  - f/u CSF studies: - protein, WBC with diff, RBC, Glucose, gram stain, cx, ME panel, cytometry, leukemia/lymphoma flow, ACE,   - f/u ENS2, ENC2 send out to [facility]  - medical management including infectious and metabolic monitoring and correction per Primary team  - Nephrology following for ATN; appreciate recommendations  - Neurology will continue to follow  - Please see Attending Neurologist Attestation for additional recommendations

## 2022-09-22 NOTE — ASSESSMENT & PLAN NOTE
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

## 2022-09-22 NOTE — PHYSICAL THERAPY NOTE
Physical Therapy Cancellation Note    PT session as patient refused reporting " I can't  Pain in my leg"  Noted increased edema in RLE  Impaired cognition  Pt  Noted to have difficulty flexing R knee  Will continue to follow as able

## 2022-09-22 NOTE — CONSULTS
Consultation - Neurology   Devin Zhang 45 y o  female MRN: [id number]  Unit/Bed#: [place] 2 -01 Encounter: [id number]      Assessment/Plan     * Encephalopathy acute  Assessment & Plan  45 y o  female with chronic pain disorder, opioid abuse, depression, anxiety, prior self-injurious behavior, prior suicide attempt, history of electroconvulsive therapy, GERD, IBS, HTN, HLD, prior cocaine use, and daily tobacco use who presented to the ED on 9/19 with AMS  Patient was reported to have fallen on 09/16 with head strike  Initially she appeared okay though was not medically evaluated  The following several days she was more sedentary and lethargic only getting up to use the bathroom  She was also noted to have decreased oral intake  On the morning of admission, patient's  reported that she was confused prompting him to seek medical evaluation  Of note patient was switched from Nucynta to MSIR 15 mg 3 weeks ago due to chronic pain for which scheduled to have spine surgery next month  Patient was found to have severe acute kidney injury and electrolyte abnormalities secondary to ATN/traumatic rhabdomyolysis with CK over 32,000 and transaminitis likely as a result  As a result of severe acute kidney injury, patient's gabapentin was held  Morphine was also held in the setting of encephalopathy  Patient's CK is trending down and transaminitis is improving  Sodium has normalized  Acute renal injury still present with creatinine increasing from 5 8-6  81  According to Nephrology, 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  Nephrology closely monitoring  Neurology was consulted secondary to persistent encephalopathy despite normalization of sodium, improvement of CK and transaminitis        Work-up:  · D-dimer 3 84 though no DVT on venous duplex  · Na 126--> gradually increased to 136, random glucose 56, BUN/Cr 53/5 88--> 51/6 81, AST 1683-->489, -->407  · Total CK 79,603-->1207  · TSH WNL  · UA negative  · UDS positive for opiates  · COVID negative  · CTH negative for acute intracranial abnormality  · CT Cspine negative for spine fracture or traumatic malalignment  · CT CAP revealing bilateral ground-glass opacities with superimposed inter an intra lobular septal thickening  · Xrays of right ankle, right foot and right hip with no osseous abnormality    Despite improvement in patient's sodium, rhabdo and transaminitis, patient continues to have ANUJA and continues to demonstrate labile moods, and persistent confusion, as well as demonstrating extrapyramidal movements  Etiology unclear but would have expected more improvement if the etiology was due to mtabolic issues    Continue neurologic work up with neurodiagnostics as noted below:     Plan:  - MRI brain pending  - Ativan 1mg on call for MRI  - Routine EEG - consent   - Obtain LP - CSF studies ordered    Leg edema, right  Assessment & Plan  - Etiology unclear  - Venous duplex negative  - Xrays of R ankle, R foot and R hip negative for osseous abnormality  - Consider further evaluation    D-dimer, elevated  Assessment & Plan  - 3 84  - venous duplex negative for DVT    Traumatic rhabdomyolysis ([facility] [address] )  Assessment & Plan  - s/p fall in 9/16 followed by a sedentary behavior with poor oral intake  - Total CK 32,634-->5033    Transaminitis  Assessment & Plan  - Improving, thought to be secondary to rhabdomyolysis  - AST 1683-->489, -->407  - GI on board    ARF (acute renal failure) ([facility] [address] )  Assessment & Plan  - Per renal thought to be secondary to pigment nephropathy/rhabdomyolysis  - BUN/Cr 53/5 88--> 51/6 81  - Nephrology following closely      Recommendations for outpatient neurological follow up have yet to be determined      History of Present Illness     Reason for Consult / Principal Problem: AMS  Hx and PE limited by: AMS  HPI: Vidhya Pettit is a 45 y o  female with chronic pain disorder, opioid abuse, depression, anxiety, prior self-injurious behavior, prior suicide attempt, history of electroconvulsive therapy, GERD, IBS, HTN, HLD, prior cocaine use, and daily tobacco use who presented to the ED on 9/19 with AMS  According to ED notes, the patient arrived via EMS from home as the  reported that she had been confused all morning  He did note that she had an unwitnessed fall possibly out of bed with head strike on 9/16  Unsure if there was LOC  She was reported to be ok afterwards but was not medically evaluated  She had been very sedentary and lethargic over the last few days with poor oral intake only getting up to use the bathroom   did not feel that patient overdosed  In the ED, patient was described as oriented to person and place but unable to answer any other questions aside from occasional yes and no's  On exam, she appeared to have pain in her right hip with range of motion and had significant swelling of the right foot and ankle  She also was noted to have old abrasions on her chin and right knee  UDS was positive for opiates  Patient is due to have spine surgery next month  She is under the care of pain management  Zeke Lozano previously did not provide relief and therefore she was switched to MSIR 15 mg for the last 3 weeks  Labs revealing D-dimer at 3 84 though no DVT on venous duplex, Na 126--> gradually increased to 136, random glucose 56, BUN/Cr 53/5 88--> 51/6 81, AST 1683-->489, -->407, total CK 32,634-->5033  TSH WNL  UA negative  IV fluids were initiated and nephrology and GI were consulted  COVID negative  CTH negative for acute intracranial abnormality  CT Cspine negative for spine fracture or traumatic malalignment  CT CAP revealing bilateral ground-glass opacities with superimposed inter an intra lobular septal thickening  No osseous abnormality noted in x-rays of the right ankle, right foot and right hip      Neurology was consulted secondary to persistent encephalopathy despite normalization of sodium, improvement of CK and transaminitis  Acute renal injury continues  Nephrology closely following  I spoke with patient's  extensively prior to examining the patient   notes minimal improvement since admission  He is unclear when exactly she became confused as he is not for certain if she was confused prior to    also expressed frustration that the patient's leg is still swollen with no clear etiology  Patient does have a history of alcohol abuse   reports that she still drinks but it not as much  At baseline  notes that patient is independent with ADLs, she is fully oriented, she follows recipes and cooks, and socializes  He does note that his wife is an emotional person  Inpatient consult to Neurology  Consult performed by: Governor Jerome PA-C  Consult ordered by: Luís Garrido DO          Review of Systems   Unable to perform ROS: Mental status change       Historical Information   Past Medical History:   Diagnosis Date   • Chronic pain disorder    • Depression    • GERD (gastroesophageal reflux disease)    • History of electroconvulsive therapy    • Low back pain    • Self-injurious behavior    • Suicide attempt [facility])      Past Surgical History:   Procedure Laterality Date   •  SECTION     • COLONOSCOPY     • PANCREAS SURGERY      "pseudocysts" per patient's  Ricki Infante   • [place] ESOPHAGOGASTRODUODENOSCOPY TRANSORAL DIAGNOSTIC N/A 4/10/2018    Procedure: EGD AND COLONOSCOPY;  Surgeon: Sandie Grimaldo MD;  [place] [facility];   Service: Gastroenterology     Social History   Social History     Substance and Sexual Activity   Alcohol Use Yes    Comment: "occasional glass of wine"     Social History     Substance and Sexual Activity   Drug Use Not Currently   • Types: Marijuana, Cocaine    Comment: medical     E-Cigarette/Vaping   • E-Cigarette Use Never User      E-Cigarette/Vaping Substances • Nicotine No    • THC No    • CBD No    • Flavoring No    • Other No    • Unknown No      Social History     Tobacco Use   Smoking Status Current Every Day Smoker   • Packs/day: 0 50   • Years: 35 00   • Pack years: 17 50   • Types: Cigarettes   Smokeless Tobacco Never Used     Family History:   Family History   Problem Relation Age of Onset   • Arthritis Mother    • Coronary artery disease Mother         MI in her 63's   • Parkinsonism Father         with falls and SDH   • Parkinsonism Paternal Grandmother    • Coronary artery disease Paternal Grandfather    • Parkinsonism Paternal Aunt    • Colon cancer Family    • Depression Neg Hx        Review of previous medical records was completed  Meds/Allergies   all current active meds have been reviewed    Allergies   Allergen Reactions   • Chantix [Varenicline]    • Ibuprofen Other (See Comments)     Upset stomach   • Lyrica [Pregabalin] Other (See Comments)     bruising   • Penicillins Other (See Comments)     ? hives   • Sulfa Antibiotics Other (See Comments)     sloughing skin in mouth   • Sulfasalazine        Objective   Vitals:Blood pressure 146/87, pulse 105, temperature 98 °F (36 7 °C), resp  rate 19, height 5' 6" (1 676 m), weight 66 5 kg (146 lb 9 7 oz), last menstrual period 03/14/2019, SpO2 94 %  ,Body mass index is 23 66 kg/m²  Intake/Output Summary (Last 24 hours) at 9/23/2022 0039  Last data filed at 9/22/2022 2130  Gross per 24 hour   Intake 3100 9 ml   Output 1300 ml   Net 1800 9 ml       Invasive Devices: Invasive Devices  Report    Peripheral Intravenous Line  Duration           Peripheral IV 09/19/22 Left Forearm 3 days          Drain  Duration           Urethral Catheter 16 Fr  2 days                Physical Exam  Vitals reviewed  Constitutional:       General: She is not in acute distress  Appearance: She is well-developed  She is not diaphoretic        Comments: Seated upright in bed appearing uncomfortable   HENT:      Head: Normocephalic and atraumatic  Eyes:      General: No scleral icterus  Right eye: No discharge  Left eye: No discharge  Conjunctiva/sclera: Conjunctivae normal    Cardiovascular:      Rate and Rhythm: Normal rate and regular rhythm  Pulmonary:      Effort: Pulmonary effort is normal  No respiratory distress  Breath sounds: No stridor  Musculoskeletal:         General: No tenderness or deformity  Normal range of motion  Cervical back: Normal range of motion and neck supple  Right lower leg: Edema (more tense in the lower leg) present  Skin:     General: Skin is warm and dry  Findings: No erythema or rash  Neurological:      Mental Status: She is alert  Coordination: Finger-Nose-Finger Test normal    Psychiatric:      Comments: Emotionally labile, crying and moaning at times  Neurologic Exam     Mental Status   Registration: recalls 3 of 3 objects  Alert  Oriented to person and hospital, but unable to tell me the name of the hospital  Incorrectly states month and year  Able to state birthdate  States she is one year younger than she is  Inattentive with decreased concentration  Unable to complete calculations or spell WORLD  Cranial Nerves     CN II   Visual acuity: normal  Right visual field deficit: none  Left visual field deficit: none     CN III, IV, VI   Conjugate gaze: present    CN V   Facial sensation intact  CN VII   Facial expression full, symmetric  CN XII   Tongue deviation: none  EOMs are intact as evidenced by tracking examiner though patient did require multiple verbal cues to complete task     Motor Exam   Overall muscle tone: normal  Exam limited due to patient's limited comprehension and pain/swelling  BUEs: Deltoids 4-, Biceps 5,  5, unable to assess tricep  BLE: RLE - lifts antigravity briefly, Does not wiggle toes on command   LLE can maintain antiravity x several seconds (at least a 4/4-), quadricep/hamstring 4 Sensory Exam   Unreliable, though temperature appeared to be intact and symmetric throughout  Gait, Coordination, and Reflexes     Coordination   Finger to nose coordination: normal    Tremor   Resting tremor: absent      Lab Results: I have personally reviewed pertinent reports  Imaging Studies: I have personally reviewed pertinent reports  No relevant brain imaging the is admission yet  EKG, Pathology, and Other Studies: I have personally reviewed pertinent reports      VTE Prophylaxis: Heparin    Code Status: Level 1 - Full Code

## 2022-09-22 NOTE — SPEECH THERAPY NOTE
Speech Language/Pathology    Speech/Language Pathology Progress Note    Patient Name: Vidhya RODARTE Date: 9/22/2022                     SLP RECOMMENDATIONS:         Diet: Level 3 Dental Soft         Liquids: Thin liquids         Medications: As tolerated         Strategies: upright posture, slow rate, small bites/sips          Subjective:  Pt disoriented and impulsive  Objective:  Pt seen for ongoing dysphagia therapy  Pt is currently on a Level 3 Dental Soft/thin liquid diet  Pt denies difficulty chewing/swallowing  Pt impulsive and restless throughout session  Pt with difficulty attending to tasks  Pt's ability to follow simple 1-step directions was variable  Pt oriented only to self  Pt observed with AM meds crushed in puree  Pt with functional manipulation of bolus and no overt s/s aspiration  By pt request, pt observed with puree with functional mastication and no overt s/s aspiration  Pt trialed with soft solids with slow, but functional mastication  Mastication mildly impacted by tardive dyskinesia like oral movements  SLP attempted to complete coarse solid trials with pt, however to adamantly declined despite encouragement  Pt reports she feels the cut up foods are slightly easier for her to eat  Recommend continuing current diet of Level 3 Dental Soft/thin liquids  Assessment:  Functional mastication of Level 3 Dental soft  No overt s/s aspiration       Plan/Recommendations:  Continue Level 3 Dental Soft/thin liquids   General aspiration precautions   SLP to follow     Problem List  Principal Problem:    Encephalopathy acute  Active Problems:    Depression    Tobacco abuse    DDD (degenerative disc disease), lumbosacral    GERD (gastroesophageal reflux disease)    ARF (acute renal failure) (HCC)    Transaminitis    Abnormal CT of the chest    Traumatic rhabdomyolysis (HCC)    D-dimer, elevated       Past Medical History  Past Medical History:   Diagnosis Date   • Chronic pain disorder • Depression    • GERD (gastroesophageal reflux disease)    • History of electroconvulsive therapy    • Low back pain    • Self-injurious behavior    • Suicide attempt [facility])         Past Surgical History  Past Surgical History:   Procedure Laterality Date   •  SECTION     • COLONOSCOPY     • PANCREAS SURGERY      "pseudocysts" per patient's  Ricki Infante   • [place] ESOPHAGOGASTRODUODENOSCOPY TRANSORAL DIAGNOSTIC N/A 4/10/2018    Procedure: EGD AND COLONOSCOPY;  Surgeon: Asia Paige MD;  [place] [facility];   Service: Gastroenterology

## 2022-09-23 ENCOUNTER — APPOINTMENT (OUTPATIENT)
Dept: MRI IMAGING | Facility: HOSPITAL | Age: 52
End: 2022-09-23

## 2022-09-23 ENCOUNTER — TELEPHONE (OUTPATIENT)
Dept: RADIOLOGY | Facility: HOSPITAL | Age: 52
End: 2022-09-23

## 2022-09-23 PROBLEM — R60.0 LEG EDEMA, RIGHT: Status: ACTIVE | Noted: 2022-09-23

## 2022-09-23 LAB
ALBUMIN SERPL BCP-MCNC: 1.7 G/DL (ref 3.5–5)
ALP SERPL-CCNC: 69 U/L (ref 46–116)
ALT SERPL W P-5'-P-CCNC: 292 U/L (ref 12–78)
ANION GAP SERPL CALCULATED.3IONS-SCNC: 11 MMOL/L (ref 4–13)
ANION GAP SERPL CALCULATED.3IONS-SCNC: 13 MMOL/L (ref 4–13)
ANION GAP SERPL CALCULATED.3IONS-SCNC: 14 MMOL/L (ref 4–13)
APPEARANCE CSF: CLEAR
AST SERPL W P-5'-P-CCNC: 167 U/L (ref 5–45)
BILIRUB SERPL-MCNC: 0.4 MG/DL (ref 0.2–1)
BUN SERPL-MCNC: 52 MG/DL (ref 5–25)
BUN SERPL-MCNC: 54 MG/DL (ref 5–25)
BUN SERPL-MCNC: 54 MG/DL (ref 5–25)
C GATTII+NEOFOR DNA CSF QL NAA+NON-PROBE: NOT DETECTED
CALCIUM ALBUM COR SERPL-MCNC: 9.9 MG/DL (ref 8.3–10.1)
CALCIUM SERPL-MCNC: 8.1 MG/DL (ref 8.3–10.1)
CALCIUM SERPL-MCNC: 8.3 MG/DL (ref 8.3–10.1)
CALCIUM SERPL-MCNC: 8.5 MG/DL (ref 8.3–10.1)
CHLORIDE SERPL-SCNC: 104 MMOL/L (ref 96–108)
CHLORIDE SERPL-SCNC: 104 MMOL/L (ref 96–108)
CHLORIDE SERPL-SCNC: 106 MMOL/L (ref 96–108)
CK MB SERPL-MCNC: 2.1 % (ref 0–2.5)
CK MB SERPL-MCNC: 31.8 NG/ML (ref 0–5)
CK SERPL-CCNC: 1482 U/L (ref 26–192)
CMV DNA CSF QL NAA+NON-PROBE: NOT DETECTED
CO2 SERPL-SCNC: 19 MMOL/L (ref 21–32)
CO2 SERPL-SCNC: 20 MMOL/L (ref 21–32)
CO2 SERPL-SCNC: 20 MMOL/L (ref 21–32)
CREAT SERPL-MCNC: 6.98 MG/DL (ref 0.6–1.3)
CREAT SERPL-MCNC: 7.15 MG/DL (ref 0.6–1.3)
CREAT SERPL-MCNC: 7.15 MG/DL (ref 0.6–1.3)
E COLI K1 DNA CSF QL NAA+NON-PROBE: NOT DETECTED
ERYTHROCYTE [DISTWIDTH] IN BLOOD BY AUTOMATED COUNT: 13.6 % (ref 11.6–15.1)
EV RNA CSF QL NAA+NON-PROBE: NOT DETECTED
GFR SERPL CREATININE-BSD FRML MDRD: 5 ML/MIN/1.73SQ M
GFR SERPL CREATININE-BSD FRML MDRD: 5 ML/MIN/1.73SQ M
GFR SERPL CREATININE-BSD FRML MDRD: 6 ML/MIN/1.73SQ M
GLUCOSE CSF-MCNC: 54 MG/DL (ref 50–80)
GLUCOSE SERPL-MCNC: 68 MG/DL (ref 65–140)
GLUCOSE SERPL-MCNC: 69 MG/DL (ref 65–140)
GLUCOSE SERPL-MCNC: 73 MG/DL (ref 65–140)
GP B STREP DNA CSF QL NAA+NON-PROBE: NOT DETECTED
GRAM STN SPEC: NORMAL
HAEM INFLU DNA CSF QL NAA+NON-PROBE: NOT DETECTED
HCT VFR BLD AUTO: 36.2 % (ref 34.8–46.1)
HGB BLD-MCNC: 11.5 G/DL (ref 11.5–15.4)
HHV6 DNA CSF QL NAA+NON-PROBE: NOT DETECTED
HSV1 DNA CSF QL NAA+NON-PROBE: NOT DETECTED
HSV1 DNA SPEC QL NAA+PROBE: NEGATIVE
HSV2 DNA CSF QL NAA+NON-PROBE: NOT DETECTED
HSV2 DNA SPEC QL NAA+PROBE: NEGATIVE
INR PPP: 1.03 (ref 0.84–1.19)
L MONOCYTOG DNA CSF QL NAA+NON-PROBE: NOT DETECTED
MCH RBC QN AUTO: 33.2 PG (ref 26.8–34.3)
MCHC RBC AUTO-ENTMCNC: 31.8 G/DL (ref 31.4–37.4)
MCV RBC AUTO: 105 FL (ref 82–98)
N MEN DNA CSF QL NAA+NON-PROBE: NOT DETECTED
PARECHOVIRUS A RNA CSF QL NAA+NON-PROBE: NOT DETECTED
PLATELET # BLD AUTO: 312 THOUSANDS/UL (ref 149–390)
PMV BLD AUTO: 9.5 FL (ref 8.9–12.7)
POTASSIUM SERPL-SCNC: 4.7 MMOL/L (ref 3.5–5.3)
POTASSIUM SERPL-SCNC: 4.9 MMOL/L (ref 3.5–5.3)
POTASSIUM SERPL-SCNC: 5.4 MMOL/L (ref 3.5–5.3)
PROT CSF-MCNC: 48 MG/DL (ref 15–45)
PROT SERPL-MCNC: 4.9 G/DL (ref 6.4–8.4)
PROTHROMBIN TIME: 13.5 SECONDS (ref 11.6–14.5)
RBC # BLD AUTO: 3.46 MILLION/UL (ref 3.81–5.12)
RBC # CSF MANUAL: 2 UL (ref 0–10)
S PNEUM DNA CSF QL NAA+NON-PROBE: NOT DETECTED
SODIUM SERPL-SCNC: 136 MMOL/L (ref 135–147)
SODIUM SERPL-SCNC: 137 MMOL/L (ref 135–147)
SODIUM SERPL-SCNC: 138 MMOL/L (ref 135–147)
TOTAL CELLS COUNTED BLD: NO
TUBE # CSF: 4
VZV DNA CSF QL NAA+NON-PROBE: NOT DETECTED
WBC # BLD AUTO: 7.64 THOUSAND/UL (ref 4.31–10.16)
WBC # CSF AUTO: 0 /UL (ref 0–5)

## 2022-09-23 PROCEDURE — 009U3ZX DRAINAGE OF SPINAL CANAL, PERCUTANEOUS APPROACH, DIAGNOSTIC: ICD-10-PCS | Performed by: PSYCHIATRY & NEUROLOGY

## 2022-09-23 RX ORDER — LIDOCAINE HYDROCHLORIDE 10 MG/ML
10 INJECTION, SOLUTION EPIDURAL; INFILTRATION; INTRACAUDAL; PERINEURAL ONCE
Status: COMPLETED | OUTPATIENT
Start: 2022-09-23 | End: 2022-09-23

## 2022-09-23 RX ORDER — HYDROMORPHONE HCL/PF 1 MG/ML
0.5 SYRINGE (ML) INJECTION EVERY 4 HOURS PRN
Status: DISCONTINUED | OUTPATIENT
Start: 2022-09-23 | End: 2022-10-25

## 2022-09-23 RX ORDER — LORAZEPAM 2 MG/ML
1 INJECTION INTRAMUSCULAR ONCE
Status: COMPLETED | OUTPATIENT
Start: 2022-09-23 | End: 2022-09-23

## 2022-09-23 RX ADMIN — SODIUM CHLORIDE 125 ML/HR: 0.9 INJECTION, SOLUTION INTRAVENOUS at 19:01

## 2022-09-23 RX ADMIN — QUETIAPINE FUMARATE 25 MG: 25 TABLET ORAL at 22:25

## 2022-09-23 RX ADMIN — LORAZEPAM 1 MG: 2 INJECTION INTRAMUSCULAR; INTRAVENOUS at 12:43

## 2022-09-23 RX ADMIN — VENLAFAXINE HYDROCHLORIDE 150 MG: 150 CAPSULE, EXTENDED RELEASE ORAL at 08:11

## 2022-09-23 RX ADMIN — SODIUM CHLORIDE 125 ML/HR: 0.9 INJECTION, SOLUTION INTRAVENOUS at 05:05

## 2022-09-23 RX ADMIN — LIDOCAINE HYDROCHLORIDE 10 ML: 10 INJECTION, SOLUTION EPIDURAL; INFILTRATION; INTRACAUDAL at 16:25

## 2022-09-23 RX ADMIN — Medication 1 PATCH: at 08:21

## 2022-09-23 NOTE — OCCUPATIONAL THERAPY NOTE
Occupational Therapy Cancel Note:    Patient Name: Yanet Mascorro  ZYXOF'T Date: 9/23/2022    Chart reviewed  Attempted to see pt for OT treatment session this PM, however pt sleeping and difficult to arouse  RN reports pt recently given Ativan for upcoming MRI  Not appropriate for therapy session at this time  Will cx and attempt at later time as schedule permits      Kimberly Powell, OTR/L

## 2022-09-23 NOTE — ASSESSMENT & PLAN NOTE
· Acute encephalopathy appears secondary to gabapentin and morphine with subsequent renal failure  · Also had hypoglycemia requiring D50  No further hypoglycemia  · Continue holding gabapentin and morphine for now  · Neurology consulted for per persistent encephalopathy  MRI without acute pathology    · Checking EEG and LP

## 2022-09-23 NOTE — ASSESSMENT & PLAN NOTE
- Etiology unclear  - Venous duplex negative  - Xrays of R ankle, R foot and R hip negative for osseous abnormality  - Consider further evaluation

## 2022-09-23 NOTE — PROGRESS NOTES
Progress Note - Nephrology   Eliz Babin 45 y o  female MRN: [id number]  Unit/Bed#: [place] [address] -01 Encounter: [id number]      Assessment/Plan    1) ANUJA (POA)  -Baseline creatinine: 0 7-1 0  -Creatinine on admission 5 88, current creatinine 6 98   -Etiology: Suspect due to pigment nephropathy/rhabdomyolysis  -UA: large blood, 0-1 RBCs, no glucose, no ketones, trace protein,  -Renal imaging: CT abdomen pelvis upon admission-mild nonspecific bilateral perinephric fat stranding, no hydronephrosis or renal calculi   -received 1 L normal saline on admission & started on isotonic saline 125 mL an hour   -On abel cath  UOP: 800 ml, I/O since admission + 5 64l   -Does not look volume overloaded  Plan  -Monitor daily volume status   -critical intake and output monitoring   -continue IV fluids isotonic saline at 125 mL/hour   -Start lasix if volume overloaded  -Trend BMP  -Avoid hypotension (MAP>65), avoid nephrotoxins, avoid NSAIDS  -Will get consent for HD in case if she needs HD in future, given lack of improvement in GFR/creatinine over therapy     2)Anion gap acidosis  -anion gap 15, bicarbonate 24 on admission,   -most recent anion gap 13, serum bicarbonate:20   -lactic acid 0 4   -suspect due to ANUJA  Plan  Continue IV fluids     3)Rhabdomyolysis  -history of recent fall/period of immobility  -CK trending down  32,634 on admission  Current:1482  Plan  -trend urine output   -continue IV fluids     4)Hyperkalemia  -potassium 5 4  Plan  -low-potassium diet   -monitor with IV fluids  -Monitor BMP     5)Hyponatremia  -sodium 126 on admission  Resolved now      6)Transaminitis  -AST greater than 1000, ALT elevated, alk-phos is normal, T bili normal on admission  Currently trending down    -suspect 2nd to rhabdomyolysis/elevated CK  -care per primary team  -CT abdomen pelvis with unremarkable liver      7)Encephalopathy  -CT head with no acute intracranial process  -neurology onboard    -TSH normal      8)Hypoglycemia  -glucose 56 on admission, with repeated episodes of hypoglycemia overnight   -care per primary     9)Elevated D-dimer  -D-dimer 3 84   -on heparin drip   -care per primary      10)Chronic pain   -on gabapentin 300 mg q i d   -would avoid that dose in the setting of ANUJA, max recommended dose 300 mg daily      Addition medical problems:  Chronic pain on gabapentin, depression, GERD    SUBJECTIVE:  Patient is still confused  But orient to TPP  No acute complaints  No fever, chest pain, palpitations, SOB, dizziness, lightheadedness, nausea or vomiting  Patient is not volume overloaded         Vitals: Blood pressure 145/91, pulse (!) 107, temperature 97 9 °F (36 6 °C), resp  rate 16, height 5' 6" (1 676 m), weight 69 6 kg (153 lb 7 oz), last menstrual period 03/14/2019, SpO2 96 %  ,Body mass index is 24 77 kg/m²  Weight (last 2 days)     Date/Time Weight    09/23/22 0532 69 6 (153 44)    09/22/22 0600 66 5 (146 61)    09/21/22 0541 64 6 (142 42)            Intake/Output Summary (Last 24 hours) at 9/23/2022 0909  Last data filed at 9/22/2022 2130  Gross per 24 hour   Intake 960 ml   Output 800 ml   Net 160 ml       Urethral Catheter 16 Fr  (Active)   Reasons to continue Urinary Catheter  Accurate I&O assessment in critically ill patients (48 hr  max) 09/21/22 0000   Goal for Removal Remove after 48 hrs of I/O monitoring 09/21/22 0000   Site Assessment Clean;Skin intact 09/21/22 0000   [place] [facility] Done 09/20/22 2100   Collection Container Standard drainage bag 09/21/22 0000   Securement Method Securing device (Describe) 09/21/22 0000   Output (mL) 250 mL 09/21/22 0525       Physical Exam:   Vitals and nursing note reviewed  Constitutional:       General: She is not in acute distress  Appearance: Normal appearance  She is not toxic-appearing or diaphoretic  HENT:      Head: Normocephalic  Comments: Abrasions on face in various stages of healing       Nose: Nose normal       Mouth: Mucous membranes are moist    Eyes:      General: No scleral icterus  Cardiovascular:      Rate and Rhythm: Normal rate and regular rhythm  Pulses: Normal pulses  Heart sounds: Normal heart sounds  Pulmonary:      Effort: Pulmonary effort is normal  No respiratory distress  Breath sounds: Normal breath sounds  No wheezing  Abdominal:      General: Abdomen is flat  There is no distension  Palpations: Abdomen is soft  Tenderness: There is no abdominal tenderness  Musculoskeletal:      Cervical back: Neck supple  Right lower leg: swollen below knee  Left lower leg: No edema  Skin:     General: Skin is warm and dry  Capillary Refill: Capillary refill takes less than 2 seconds  Neurological:      Mental Status: She is alert, but confused  Volume status: acceptable  Does not look volume overloaded       Lab, Imaging and other studies:   CMP:   Lab Results   Component Value Date    SODIUM 137 09/23/2022    K 5 4 (H) 09/23/2022     09/23/2022    CO2 20 (L) 09/23/2022    BUN 54 (H) 09/23/2022    CREATININE 6 98 (H) 09/23/2022    CALCIUM 8 3 09/23/2022     (H) 09/23/2022     (H) 09/23/2022    ALKPHOS 69 09/23/2022    EGFR 6 09/23/2022

## 2022-09-23 NOTE — ASSESSMENT & PLAN NOTE
· Secondary to rhabdomyolysis  No evidence of liver injury on CT imaging  · Seen by GI  Hepatic duplex also negative      Results from last 7 days   Lab Units 09/23/22  0530 09/21/22  0550 09/20/22  0606 09/19/22  0829   AST U/L 167* 489* 970* 1,683*   ALT U/L 292* 407* 512* 674*   TOTAL BILIRUBIN mg/dL 0 40 0 30 0 35 0 58

## 2022-09-23 NOTE — TELEPHONE ENCOUNTER
Justin texted Kia Cover at 1110 to let her know Lumbar puncture's can't be done in xray  Please place an "IR consult"     TCW 9/23/22 1110

## 2022-09-23 NOTE — ASSESSMENT & PLAN NOTE
· Secondary to fall on Friday  IV fluids per Nephrology      Results from last 7 days   Lab Units 09/23/22  0530 09/22/22  0756 09/21/22  0550 09/20/22  0606 09/19/22  0829   CK TOTAL U/L 1,482* 2,468* 5,033* 14,147* 89,409*

## 2022-09-23 NOTE — ASSESSMENT & PLAN NOTE
45 y o  female with chronic pain disorder with chronic opioid use, depression/anxiety, with prior self-injurious behavior and prior suicide attempt, h/o electroconvulsive therapy, HTN, HLD, daily tobacco use, prior cocaine use, and prior alcohol abuse, originally presented on 9/19 for altered mental status  · Patient suffered fall with head strike on 9/16  Initially appeared okay, but progressively became more lethargic/sedentary with decreased PO intake, therefore she was brought to the ED  Initial eval had shown that patient was alert, oriented to person and place only, and only answering questions with yes/no  · Workup on arrival:  · Found to have traumatic rhabdo, acute renal failure, transaminitis, and hyponatremia   · CTH was unremarkable  CT CAP revealed bilateral ground glass opacities  · UA negative, TSH WNL, COVID negative    · UDS positive for opioids (prescribed)   · Home gabapentin and morphine were held on admission due to ANUJA and encephalopathy  · Neurology was asked to evaluate on 9/23 due to persistent encephalopathy in the setting of improving metabolic derangements  · At baseline, patient described to be independent with ADLs, fully oriented, socializes with others  Does have underlying psychiatric history and substance abuse history as detailed above  Is prescribed chronic opioids for back pack and was scheduled to have spine surgery in the next month or so  · Neurologic workup completed during hospitalization:   · MRI brain was motion degraded, but no obvious signs of intracranial pathology  · Patient refused routine EEG  Did not re-attempt since suspicion for seizure was low/test likely low yield  · Lumbar puncture completed 9/24:   · Opening pressure 26  · Slight elevation in protein (48), though clinically unremarkable  WBC, RBC, Glucose, ACE, gram stain/culture, ME panel, ENC2 panel WNL     · Initially autoimmune encephalitis   · Neurology was asked to re-evaluate the patient on 10/11 due to persistent encephalopathy  Patient has undergone extensive neurologic workup on presentation, all of which have been unremarkable thus far  At this point, suspect persistent metabolic encephalopathy (?Wernicke's)  Do not suspect vascular event  Do not suspect ongoing seizures  Delirium may also be contributing, though not the primary issue or reason for AMS  Plan:  - Can repeat MRI brain to evaluate for any metabolic changes that could be present  - Serum labs:   · Check Vitamin B12, Folate, RPR  · Recheck vitamin B1  - Would recommend restarting thiamine after lab drawn  Can start with high dose IV treatment and then proceed with oral supplementation daily  - Can defer EEG  - No need for repeat LP or lab work at this time  - Medical management and supportive care per primary team  Correction of any metabolic or infectious disturbances

## 2022-09-23 NOTE — QUICK NOTE
-Reviewed recent BMP trends/urine output/bedside exam, no improvement in GFR  No emergent indications for dialysis today however given lack of improvement in GFR over recent days with therapy, concern for possible renal replacement requirements in the coming days  Patient is altered and unable to consent for dialysis  -I discussed via phone with mitchell Robison, reviewed indications for renal replacement therapy including electrolyte abnormalities/acidosis/volume overload, reviewed modalities for renal replacement therapy including intermittent hemodialysis and slower continuous/CRRT  Reviewed access/requirements with temporary or tunneled HD PermCath placement to perform dialysis  Reviewed risks and benefits of dialysis including risks of infection, hypotension, cardiac arrhythmia leading to sudden death  Mitchell Robison consented for dialysis on behalf of Hernán Curtis  Reviewed recent nephrology plan and we can plan  Dialysis consent form filled out and placed in chart

## 2022-09-23 NOTE — PROGRESS NOTES
[facility]  Progress Note - Jojo Sloan 1970, 45 y o  female MRN: [id number]  Unit/Bed#: [place] [address] -01 Encounter: [id number]  Primary Care Provider: Lei Bhandari MD   Date and time admitted to hospital: 9/19/2022  8:04 AM    * Encephalopathy acute  Assessment & Plan  · Acute encephalopathy appears secondary to gabapentin and morphine with subsequent renal failure  · Also had hypoglycemia requiring D50  No further hypoglycemia  · Continue holding gabapentin and morphine for now  · Neurology consulted for per persistent encephalopathy  MRI without acute pathology  · Checking EEG and LP    ARF (acute renal failure) ([facility])  Assessment & Plan  · ANUJA/ATN  · Secondary to rhabdomyolysis  No evidence of renal obstruction on imaging  · Appreciate nephrology evaluation  Awaiting renal recovery  Continue IV fluids  · Holding off on dialysis if possible    Results from last 7 days   Lab Units 09/23/22  0824 09/23/22  0530 09/23/22  0132 09/22/22  1656 09/22/22  0756 09/21/22  1415 09/21/22  0905 09/20/22  2326 09/20/22  1526   BUN mg/dL 54* 52* 54* 52* 51* 54* 52* 52* 54*   CREATININE mg/dL 6 98* 7 15* 7 15* 7 10* 6 81* 6 74* 6 72* 6 53* 6 64*   EGFR ml/min/1 73sq m 6 5 5 5 6 6 6 6 6       Transaminitis  Assessment & Plan  · Secondary to rhabdomyolysis  No evidence of liver injury on CT imaging  · Seen by GI  Hepatic duplex also negative  Results from last 7 days   Lab Units 09/23/22  0530 09/21/22  0550 09/20/22  0606 09/19/22  0829   AST U/L 167* 489* 970* 1,683*   ALT U/L 292* 407* 512* 674*   TOTAL BILIRUBIN mg/dL 0 40 0 30 0 35 0 58       D-dimer, elevated  Assessment & Plan  · Elevated D-dimer may be related to fall  · Lower extremity duplex negative for DVT  · Renal dysfunction cannot check CTA of chest but doubt PE    · Discontinued heparin infusion and transitioned to prophylaxis dose    Traumatic rhabdomyolysis [facility])  Assessment & Plan  · Secondary to fall on Friday  IV fluids per Nephrology  Results from last 7 days   Lab Units 09/23/22  0530 09/22/22  0756 09/21/22  0550 09/20/22  0606 09/19/22  0829   CK TOTAL U/L 1,482* 2,468* 5,033* 14,147* 71,902*       Abnormal CT of the chest  Assessment & Plan  · Left-sided opacities noted  COVID negative  · Procalcitonin only minimally elevated doubt active infection    Results from last 7 days   Lab Units 09/20/22  0606   PROCALCITONIN ng/ml 0 31*       GERD (gastroesophageal reflux disease)  Assessment & Plan  · Holding PPI given renal injury    DDD (degenerative disc disease), lumbosacral  Assessment & Plan  · Lumbar radiculopathy due for surgery  · Prior to admission was on gabapentin and morphine IR 15 mg   · Confirmed on   Holding both for now given kidney injury  Tobacco abuse  Assessment & Plan  · Nicotine patch ordered    Depression  Assessment & Plan  · Continue venlafaxine and quetiapine  · Tearful at times      VTE Pharmacologic Prophylaxis: VTE Score: 1 Low Risk (Score 0-2) - Encourage Ambulation  Patient Centered Rounds: I have performed bedside rounds with nursing staff today  Discussions with Specialists or Other Care Team Provider:  Nephrology and Neurology    Education and Discussions with Family / Patient: Updated  () at bedside  Time Spent for Care: 20 mins  More than 50% of total time spent on counseling and coordination of care as described above  Current Length of Stay: 4 day(s)  Current Patient Status: Inpatient   Certification Statement: The patient will continue to require additional inpatient hospital stay due to further workup for confusion and kidney injury  Discharge Plan / Estimated Discharge Date: Anticipate discharge in >72 hrs to rehab facility  Code Status: Level 1 - Full Code      Subjective:   Patient seen and examined  Tearful at times, denies having any leg pains      Objective:   Vitals: Blood pressure 145/91, pulse (!) 107, temperature 97 9 °F (36 6 °C), resp  rate 16, height 5' 6" (1 676 m), weight 69 6 kg (153 lb 7 oz), last menstrual period 03/14/2019, SpO2 96 %  Intake/Output Summary (Last 24 hours) at 9/23/2022 1522  Last data filed at 9/22/2022 2130  Gross per 24 hour   Intake --   Output 800 ml   Net -800 ml       Physical Exam  Vitals reviewed  Constitutional:       General: She is not in acute distress  Appearance: Normal appearance  HENT:      Head: Atraumatic  Cardiovascular:      Rate and Rhythm: Regular rhythm  Heart sounds: Normal heart sounds  Pulmonary:      Breath sounds: Decreased breath sounds present  No wheezing  Abdominal:      General: Bowel sounds are normal       Palpations: Abdomen is soft  Tenderness: There is no guarding or rebound  Musculoskeletal:         General: Swelling (Right lower extremity) present  Skin:     General: Skin is warm  Neurological:      Mental Status: She is alert  She is disoriented  Motor: Weakness (Right leg) present  Psychiatric:         Mood and Affect: Affect is tearful         Additional Data:   Labs:  Results from last 7 days   Lab Units 09/23/22  0530 09/21/22  0550 09/19/22  1047 09/19/22  0829   WBC Thousand/uL 7 64 5 97  --  9 38   HEMOGLOBIN g/dL 11 5 12 2  --  12 6   PLATELETS Thousands/uL 312 329  --  397*   MCV fL 105* 102*  --  101*   INR  1 03  --  1 11  --      Results from last 7 days   Lab Units 09/23/22  0824 09/23/22  0530 09/23/22  0132 09/21/22  0905 09/21/22  0550 09/20/22  0909 09/20/22  0606   SODIUM mmol/L 137 138 136   < >  --    < >  --    POTASSIUM mmol/L 5 4* 4 7 4 9   < >  --    < >  --    CHLORIDE mmol/L 104 104 106   < >  --    < >  --    CO2 mmol/L 20* 20* 19*   < >  --    < >  --    ANION GAP mmol/L 13 14* 11   < >  --    < >  --    BUN mg/dL 54* 52* 54*   < >  --    < >  --    CREATININE mg/dL 6 98* 7 15* 7 15*   < >  --    < >  --    CALCIUM mg/dL 8 3 8 1* 8 5   < >  --    < >  --    ALBUMIN g/dL  --  1 7*  --   --  1 6*  -- 1 8*   TOTAL BILIRUBIN mg/dL  --  0 40  --   --  0 30  --  0 35   ALK PHOS U/L  --  69  --   --  73  --  79   ALT U/L  --  292*  --   --  407*  --  512*   AST U/L  --  167*  --   --  489*  --  970*   EGFR ml/min/1 73sq m 6 5 5   < >  --    < >  --    GLUCOSE RANDOM mg/dL 68 69 73   < >  --    < >  --     < > = values in this interval not displayed  Results from last 7 days   Lab Units 09/21/22  0550   PHOSPHORUS mg/dL 7 2*     Results from last 7 days   Lab Units 09/23/22  0530 09/22/22  0756 09/21/22  0550   CK TOTAL U/L 1,482* 2,468* 5,033*   CK MB INDEX % 2 1 2 2 1 7     Results from last 7 days   Lab Units 09/19/22  1220 09/19/22  1047 09/19/22  0829   HS TNI 0HR ng/L  --   --  80*   HS TNI 2HR ng/L  --  73*  --    HS TNI 4HR ng/L 84*  --   --           Results from last 7 days   Lab Units 09/20/22  0606 09/19/22  1222   LACTIC ACID mmol/L  --  0 4*   PROCALCITONIN ng/ml 0 31*  --      Results from last 7 days   Lab Units 09/20/22  1154   POC GLUCOSE mg/dl 90         Results from last 7 days   Lab Units 09/19/22  0829   TSH 3RD GENERATON uIU/mL 3 047     * I Have Reviewed All Lab Data Listed Above  Cultures:   Results from last 7 days   Lab Units 09/19/22  1401   INFLUENZA A PCR  Negative       Results from last 7 days   Lab Units 09/19/22  1401   SARS-COV-2  Negative   INFLUENZA A PCR  Negative   INFLUENZA B PCR  Negative   RSV PCR  Negative           Lines/Drains:  Invasive Devices  Report    Peripheral Intravenous Line  Duration           Peripheral IV 09/23/22 Left;Ventral (anterior) Forearm <1 day          Drain  Duration           Urethral Catheter 16 Fr  3 days              Telemetry:      Imaging:  Imaging Reports Reviewed Today Include:   CT chest abdomen pelvis wo contrast    Result Date: 9/19/2022  Impression: Bilateral groundglass opacities with superimposed inter and intralobular septal thickening    Differential diagnosis includes pulmonary hemorrhage, infection, and pulmonary edema, although the distribution is atypical for edema  The study was marked in [facility] for immediate notification  Workstation performed: [id number]     XR hip/pelv 2-3 vws right    Result Date: 9/19/2022  Impression: No acute osseous abnormality  Workstation performed: [id number]     XR ankle 3+ views RIGHT    Result Date: 9/19/2022  Impression: No acute osseous abnormality  Workstation performed: [id number]     XR foot 3+ views RIGHT    Result Date: 9/19/2022  Impression: No acute osseous abnormality  Workstation performed: [id number]     CT head without contrast    Result Date: 9/19/2022  Impression: No acute intracranial process  No skull fracture  Workstation performed: [id number]     CT spine cervical without contrast    Result Date: 9/19/2022  Impression: No cervical spine fracture or traumatic malalignment  Incidental finding of partially visualized subpleural groundglass opacity in the left pulmonary apex presumably scarring  Cannot exclude infiltrate  This study demonstrates a significant  finding and was documented as such in [facility] for liaison and referring practitioner notification  Workstation performed: [id number]     MRI brain wo contrast    Result Date: 9/23/2022  Impression: No evidence of recent infarct  No mass effect or midline shift  Study performed in the limited fashion with extensive motion artifact  Workstation performed: [id number]     XR chest 1 view    Result Date: 9/19/2022  Impression: No acute cardiopulmonary disease  Findings are stable Workstation performed: [id number]     US right upper quadrant with liver dopplers    Result Date: 9/20/2022  Impression: 1  Minimal layering sludge without evidence of acute cholecystitis  2   Normal-appearing liver with normal liver Dopplers  Workstation performed: [id number]     CT lower extremity wo contrast right    Result Date: 9/19/2022  Impression: There is no evidence of intramuscular hematoma or other mass lesion in the right calf    Please note that rhabdomyolysis and compartment syndrome are clinical diagnoses, and are not excluded based on these imaging findings  Workstation performed: [id number]       Scheduled Meds:  Current Facility-Administered Medications   Medication Dose Route Frequency Provider Last Rate   • acetaminophen  650 mg Oral Q6H PRN Subhash Lopez DO     • HYDROmorphone  0 5 mg Intravenous Q4H PRN Subhash Lopez DO     • lidocaine (PF)  10 mL Infiltration Once CARLITOS Chilel     • nicotine  1 patch Transdermal Daily Atul Adan DO     • ondansetron  4 mg Intravenous Q4H PRN Subhash Lopez DO     • QUEtiapine  25 mg Oral HS Atul Adan DO     • sodium chloride  125 mL/hr Intravenous Continuous KinneyCARLITOS Hall 125 mL/hr (09/23/22 0505)   • venlafaxine  150 mg Oral Daily Subhash Lopez DO         Today, Patient Was Seen By: Subhash Lopez DO    ** Please Note: Dictation voice to text software may have been used in the creation of this document   **

## 2022-09-23 NOTE — ASSESSMENT & PLAN NOTE
· ANUJA/ATN  · Secondary to rhabdomyolysis  No evidence of renal obstruction on imaging  · Appreciate nephrology evaluation  Awaiting renal recovery  Continue IV fluids    · Holding off on dialysis if possible    Results from last 7 days   Lab Units 09/23/22  0824 09/23/22  0530 09/23/22  0132 09/22/22  1656 09/22/22  0756 09/21/22  1415 09/21/22  0905 09/20/22  2326 09/20/22  1526   BUN mg/dL 54* 52* 54* 52* 51* 54* 52* 52* 54*   CREATININE mg/dL 6 98* 7 15* 7 15* 7 10* 6 81* 6 74* 6 72* 6 53* 6 64*   EGFR ml/min/1 73sq m 6 5 5 5 6 6 6 6 6

## 2022-09-23 NOTE — SPEECH THERAPY NOTE
Speech Language/Pathology    Speech/Language Pathology Progress Note    Patient Name: Yasemin Alcantara  Today's Date: 2022     Problem List  Principal Problem:    Encephalopathy acute  Active Problems:    Depression    Tobacco abuse    DDD (degenerative disc disease), lumbosacral    GERD (gastroesophageal reflux disease)    ARF (acute renal failure) (HCC)    Transaminitis    Abnormal CT of the chest    Traumatic rhabdomyolysis (HCC)    D-dimer, elevated    Leg edema, right       Past Medical History  Past Medical History:   Diagnosis Date   • Chronic pain disorder    • Depression    • GERD (gastroesophageal reflux disease)    • History of electroconvulsive therapy    • Low back pain    • Self-injurious behavior    • Suicide attempt [facility])         Past Surgical History  Past Surgical History:   Procedure Laterality Date   •  SECTION     • COLONOSCOPY     • PANCREAS SURGERY      "pseudocysts" per patient's  Ricki Infante   • NM ESOPHAGOGASTRODUODENOSCOPY TRANSORAL DIAGNOSTIC N/A 4/10/2018    Procedure: EGD AND COLONOSCOPY;  Surgeon: Asia Paige MD;  [place] [facility]; Service: Gastroenterology         Subjective:  Pt arousable  Kept rubbing her face and putting hear hands thru her hair  No really responding to me while seated upright  Objective:  Pt seen for po tolerance and additional recommendations  , Per nurse  pt was combative w/ eeg techs and they were unable to complete it  Pt was biting on the straw and needed max cues to suck  Managed to take one small sip  I was able to pipe some liquid in with straw (mouth and lips dry)  Reduced control but no cough or wet vocal quality  Able to get only a small amt of the cottage cheese and chopped canned peaches in  Mastication was adequate  She then mumbled "I don't want any more"  I asked her what we could do for her, if she had any pain, etc  no response  Nods head but not as if it is a purposeful response     Assessment:  More confused since last seen by this slp 3 days go (another slp was following)  Noted she had not eaten any dinner (tray was still in room), and intake this am was MINIMAL  Plan/Recommendations:  Continue level 3 dental and thin if fully alert w/ full supervision  May need short term alterate nutrition if intake remains poor and mental status does not improve  Spoke w/ nurse, pca, slim  Will continue to follow

## 2022-09-23 NOTE — PLAN OF CARE
Problem: PAIN - ADULT  Goal: Verbalizes/displays adequate comfort level or baseline comfort level  Description: Interventions:  - Encourage patient to monitor pain and request assistance  - Assess pain using appropriate pain scale  - Administer analgesics based on type and severity of pain and evaluate response  - Implement non-pharmacological measures as appropriate and evaluate response  - Consider cultural and social influences on pain and pain management  - Notify physician/advanced practitioner if interventions unsuccessful or patient reports new pain  Outcome: Progressing     Problem: INFECTION - ADULT  Goal: Absence or prevention of progression during hospitalization  Description: INTERVENTIONS:  - Assess and monitor for signs and symptoms of infection  - Monitor lab/diagnostic results  - Monitor all insertion sites, i e  indwelling lines, tubes, and drains  - Monitor endotracheal if appropriate and nasal secretions for changes in amount and color  - [place] appropriate cooling/warming therapies per order  - Administer medications as ordered  - Instruct and encourage patient and family to use good hand hygiene technique  - Identify and instruct in appropriate isolation precautions for identified infection/condition  Outcome: Progressing  Goal: Absence of fever/infection during neutropenic period  Description: INTERVENTIONS:  - Monitor WBC    Outcome: Progressing     Problem: SAFETY ADULT  Goal: Patient will remain free of falls  Description: INTERVENTIONS:  - Educate patient/family on patient safety including physical limitations  - Instruct patient to call for assistance with activity   - Consult OT/PT to assist with strengthening/mobility   - Keep Call bell within reach  - Keep bed low and locked with side rails adjusted as appropriate  - Keep care items and personal belongings within reach  - Initiate and maintain comfort rounds  - Make Fall Risk Sign visible to staff  - Offer Toileting every  Hours, in advance of need  - Initiate/Maintain alarm  - Obtain necessary fall risk management equipment:   - Apply yellow socks and bracelet for high fall risk patients  - Consider moving patient to room near nurses station  Outcome: Progressing  Goal: Maintain or return to baseline ADL function  Description: INTERVENTIONS:  -  Assess patient's ability to carry out ADLs; assess patient's baseline for ADL function and identify physical deficits which impact ability to perform ADLs (bathing, care of mouth/teeth, toileting, grooming, dressing, etc )  - Assess/evaluate cause of self-care deficits   - Assess range of motion  - Assess patient's mobility; develop plan if impaired  - Assess patient's need for assistive devices and provide as appropriate  - Encourage maximum independence but intervene and supervise when necessary  - Involve family in performance of ADLs  - Assess for home care needs following discharge   - Consider OT consult to assist with ADL evaluation and planning for discharge  - Provide patient education as appropriate  Outcome: Progressing  Goal: Maintains/Returns to pre admission functional level  Description: INTERVENTIONS:  - Perform BMAT or MOVE assessment daily    - Set and communicate daily mobility goal to care team and patient/family/caregiver  - Collaborate with rehabilitation services on mobility goals if consulted  - Perform Range of Motion  times a day  - Reposition patient every  hours    - Dangle patient  times a day  - Stand patient  times a day  - Ambulate patient  times a day  - Out of bed to chair  times a day   - Out of bed for meals  times a day  - Out of bed for toileting  - Record patient progress and toleration of activity level   Outcome: Progressing     Problem: DISCHARGE PLANNING  Goal: Discharge to home or other facility with appropriate resources  Description: INTERVENTIONS:  - Identify barriers to discharge w/patient and caregiver  - Arrange for needed discharge resources and transportation as appropriate  - Identify discharge learning needs (meds, wound care, etc )  - Arrange for interpretive services to assist at discharge as needed  - Refer to Case Management Department for coordinating discharge planning if the patient needs post-hospital services based on physician/advanced practitioner order or complex needs related to functional status, cognitive ability, or social support system  Outcome: Progressing     Problem: Knowledge Deficit  Goal: Patient/family/caregiver demonstrates understanding of disease process, treatment plan, medications, and discharge instructions  Description: Complete learning assessment and assess knowledge base    Interventions:  - Provide teaching at level of understanding  - Provide teaching via preferred learning methods  Outcome: Progressing     Problem: Potential for Falls  Goal: Patient will remain free of falls  Description: INTERVENTIONS:  - Educate patient/family on patient safety including physical limitations  - Instruct patient to call for assistance with activity   - Consult OT/PT to assist with strengthening/mobility   - Keep Call bell within reach  - Keep bed low and locked with side rails adjusted as appropriate  - Keep care items and personal belongings within reach  - Initiate and maintain comfort rounds  - Make Fall Risk Sign visible to staff  - Offer Toileting every  Hours, in advance of need  - Initiate/Maintain alarm  - Obtain necessary fall risk management equipment:   - Apply yellow socks and bracelet for high fall risk patients  - Consider moving patient to room near nurses station  Outcome: Progressing     Problem: MOBILITY - ADULT  Goal: Maintain or return to baseline ADL function  Description: INTERVENTIONS:  -  Assess patient's ability to carry out ADLs; assess patient's baseline for ADL function and identify physical deficits which impact ability to perform ADLs (bathing, care of mouth/teeth, toileting, grooming, dressing, etc )  - Assess/evaluate cause of self-care deficits   - Assess range of motion  - Assess patient's mobility; develop plan if impaired  - Assess patient's need for assistive devices and provide as appropriate  - Encourage maximum independence but intervene and supervise when necessary  - Involve family in performance of ADLs  - Assess for home care needs following discharge   - Consider OT consult to assist with ADL evaluation and planning for discharge  - Provide patient education as appropriate  Outcome: Progressing  Goal: Maintains/Returns to pre admission functional level  Description: INTERVENTIONS:  - Perform BMAT or MOVE assessment daily    - Set and communicate daily mobility goal to care team and patient/family/caregiver  - Collaborate with rehabilitation services on mobility goals if consulted  - Perform Range of Motion  times a day  - Reposition patient every  hours    - Dangle patient  times a day  - Stand patient  times a day  - Ambulate patient  times a day  - Out of bed to chair  times a day   - Out of bed for meals  times a day  - Out of bed for toileting  - Record patient progress and toleration of activity level   Outcome: Progressing     Problem: Prexisting or High Potential for Compromised Skin Integrity  Goal: Skin integrity is maintained or improved  Description: INTERVENTIONS:  - Identify patients at risk for skin breakdown  - Assess and monitor skin integrity  - Assess and monitor nutrition and hydration status  - Monitor labs   - Assess for incontinence   - Turn and reposition patient  - Assist with mobility/ambulation  - Relieve pressure over bony prominences  - Avoid friction and shearing  - Provide appropriate hygiene as needed including keeping skin clean and dry  - Evaluate need for skin moisturizer/barrier cream  - Collaborate with interdisciplinary team   - Patient/family teaching  - Consider wound care consult   Outcome: Progressing     Problem: Nutrition/Hydration-ADULT  Goal: Nutrient/Hydration intake appropriate for improving, restoring or maintaining nutritional needs  Description: Monitor and assess patient's nutrition/hydration status for malnutrition  Collaborate with interdisciplinary team and initiate plan and interventions as ordered  Monitor patient's weight and dietary intake as ordered or per policy  Utilize nutrition screening tool and intervene as necessary  Determine patient's food preferences and provide high-protein, high-caloric foods as appropriate       INTERVENTIONS:  - Monitor oral intake, urinary output, labs, and treatment plans  - Assess nutrition and hydration status and recommend course of action  - Evaluate amount of meals eaten  - Assist patient with eating if necessary   - Allow adequate time for meals  - Recommend/ encourage appropriate diets, oral nutritional supplements, and vitamin/mineral supplements  - Order, calculate, and assess calorie counts as needed  - Recommend, monitor, and adjust tube feedings and TPN/PPN based on assessed needs  - Assess need for intravenous fluids  - Provide specific nutrition/hydration education as appropriate  - Include patient/family/caregiver in decisions related to nutrition  Outcome: Progressing

## 2022-09-23 NOTE — ASSESSMENT & PLAN NOTE
- S/p fall in 9/16 followed by a sedentary behavior with poor oral intake  - Total CK 32,634-->1482 on 9/23 --> 624 on 9/29  - Acute renal failure on arrival, creatinine 5 88   Creatinine was 1 37 as of this AM

## 2022-09-23 NOTE — PROGRESS NOTES
Critical access hospital0 [facility]  Neurology Progress Note    Nimco Hilton 1970, 45 y o  female MRN: [id number]  Unit/Bed#: [place] 68 2 -01 Encounter: [id number]  Primary Care Provider: Jana Hernandez MD   Date and time admitted to hospital: 9/19/2022  8:04 AM    * Encephalopathy acute  Assessment & Plan  45 y o  female with chronic pain disorder with chronic opioid use, depression, anxiety, prior self-injurious behavior, prior suicide attempt, h/o electroconvulsive therapy, GERD, IBS, HTN, HLD, daily tobacco use who presented to the ED on 9/19/22 with alteration in mental status  Per report patient with recent fall 9/16 with head strike  She initially appeared okay, was not medically evaluated, but then was found to be more lethargic, sedentary with decreased PO intake  On admission she was found to have ATN, traumatic rhabdomyolysis (CK over 32,000), transaminitis and hyponatremia  Her home gabapentin and Morphine was held due to ANUJA and encephhalothy  Of note patient was switched from Nucynta to MSIR 15 mg 3 weeks ago due to chronic pain for which scheduled to have spine surgery next month  Neurology was asked to evaluate given persistent encephalopathy despite improvement in hyponatremia, rhabdomyolysis, and transaminitis improving  Slow improvement in ATN      Work-up:  · D-dimer 3 84, venous duplex negative for DVT  · Na 126-->137,glucose 56, BUN/Cr 53/5 88--> 51/6 98, AST 1683-->167, -->292  · Total CK 32,634-->1482, TSH WNL  · UA negative, UDS positive for opiates, COVID negative  · CTH negative for acute intracranial abnormality  · CT Cspine negative for spine fracture or traumatic malalignment  · CT CAP revealing bilateral ground-glass opacities with superimposed inter an intra lobular septal thickening  · Xrays of right ankle, right foot and right hip with no osseous abnormality  · MRI Brain with no evidence of recent infarct, midline shift   Study degraded by motion  · LP completed with opening pressure 26  CSF studies collected    Etiology unclear but would have expected more improvement if the etiology was due to metabolic issues  Continue neurologic work up awaiting CSF lab results     Plan:  - Routine EEG - patient refused  - f/u CSF studies: - protein, WBC with diff, RBC, Glucose, gram stain, cx, ME panel, cytometry, leukemia/lymphoma flow, ACE,   - f/u ENS2, ENC2 send out to [facility]  - medical management including infectious and metabolic monitoring and correction per Primary team  - Nephrology following for ATN; appreciate recommendations  - Neurology will continue to follow  - Please see Attending Neurologist Attestation for additional recommendations    Leg edema, right  Assessment & Plan  - Etiology unclear  - Venous duplex negative  - Xrays of R ankle, R foot and R hip negative for osseous abnormality  - Consider further evaluation    Traumatic rhabdomyolysis ([facility] 75 )  Assessment & Plan  - s/p fall in 9/16 followed by a sedentary behavior with poor oral intake  - Total CK 32,634-->1482    Transaminitis  Assessment & Plan  - Improving, thought to be secondary to rhabdomyolysis  - AST 1683-->167, -->292  - GI on board    ARF (acute renal failure) ([facility] )  Assessment & Plan  - Per renal thought to be secondary to pigment nephropathy/rhabdomyolysis  - BUN/Cr 53/5 88--> 51/6 81  - Nephrology following closely    Tobacco abuse  Assessment & Plan  - recommend cessation, ongoing support and education      Recommendations for outpatient neurological follow up have yet to be determined  Subjective:   Unable to assess  Patient sedated due to receiving dose of Ativan prior to MRI  Review of Systems   Unable to perform ROS: Mental status change       Vitals: Blood pressure 145/91, pulse (!) 107, temperature 97 9 °F (36 6 °C), resp  rate 16, height 5' 6" (1 676 m), weight 69 6 kg (153 lb 7 oz), last menstrual period 03/14/2019, SpO2 96 %  ,Body mass index is 24 77 kg/m²  MEDS:  Current Facility-Administered Medications   Medication Dose Route Frequency Provider Last Rate   • acetaminophen  650 mg Oral Q6H PRN Gina Justyna, DO     • LORazepam  1 mg Intravenous On Call Gretchen Cabello PA-C     • nicotine  1 patch Transdermal Daily Atul Adan, DO     • ondansetron  4 mg Intravenous Q4H PRN Gina Justyna, DO     • QUEtiapine  25 mg Oral HS Atul Adan, DO     • sodium chloride  125 mL/hr Intravenous Continuous Theo Hunting, CRNP 125 mL/hr (09/23/22 0505)   • venlafaxine  150 mg Oral Daily Gina Justyna, DO     :      Physical Exam  Constitutional:       General: She is not in acute distress  Appearance: She is ill-appearing  HENT:      Head: Normocephalic  Comments: Healing abrasion on chin     Mouth/Throat:      Mouth: Mucous membranes are dry  Eyes:      Extraocular Movements: Extraocular movements intact and EOM normal       Conjunctiva/sclera: Conjunctivae normal    Cardiovascular:      Rate and Rhythm: Normal rate and regular rhythm  Heart sounds: Normal heart sounds  Pulmonary:      Effort: Pulmonary effort is normal  No respiratory distress  Breath sounds: Normal breath sounds  Abdominal:      General: Bowel sounds are normal  There is no distension  Palpations: Abdomen is soft  Musculoskeletal:         General: No swelling  Cervical back: Neck supple  No rigidity or tenderness  Right lower leg: No edema  Left lower leg: No edema  Skin:     General: Skin is warm and dry  Neurologic Exam     Mental Status   Exam limited due to dose of Ativan given prior to MRI   Opens eyes to voice, mumbles 1-2 words, moves all extremities     Cranial Nerves     CN III, IV, VI   Extraocular motions are normal    Face appears symmetric at rest and with movement     Motor Exam   Muscle bulk: normal  Overall muscle tone: normalStrength appears full     Sensory Exam   Localizes to light tactile stimulation all extremities       Lab Results:   I have personally reviewed pertinent reports  CBC:   Results from last 7 days   Lab Units 09/23/22  0530 09/21/22  0550 09/19/22  0829   WBC Thousand/uL 7 64 5 97 9 38   RBC Million/uL 3 46* 3 71* 3 81   HEMOGLOBIN g/dL 11 5 12 2 12 6   HEMATOCRIT % 36 2 37 7 38 4   MCV fL 105* 102* 101*   PLATELETS Thousands/uL 312 329 397*   , BMP/CMP:   Results from last 7 days   Lab Units 09/23/22  0824 09/23/22  0530 09/23/22  0132 09/21/22  0905 09/21/22  0550 09/20/22  0909 09/20/22  0606   SODIUM mmol/L 137 138 136   < >  --    < >  --    POTASSIUM mmol/L 5 4* 4 7 4 9   < >  --    < >  --    CHLORIDE mmol/L 104 104 106   < >  --    < >  --    CO2 mmol/L 20* 20* 19*   < >  --    < >  --    BUN mg/dL 54* 52* 54*   < >  --    < >  --    CREATININE mg/dL 6 98* 7 15* 7 15*   < >  --    < >  --    CALCIUM mg/dL 8 3 8 1* 8 5   < >  --    < >  --    AST U/L  --  167*  --   --  489*  --  970*   ALT U/L  --  292*  --   --  407*  --  512*   ALK PHOS U/L  --  69  --   --  73  --  79   EGFR ml/min/1 73sq m 6 5 5   < >  --    < >  --     < > = values in this interval not displayed     , Vitamin B12:   , HgBA1C:   , TSH:   Results from last 7 days   Lab Units 09/19/22  0829   TSH 3RD GENERATON uIU/mL 3 047   , Coagulation:   Results from last 7 days   Lab Units 09/23/22  0530   INR  1 03   , Lipid Profile:   , Ammonia:   Results from last 7 days   Lab Units 09/19/22  0829   AMMONIA umol/L 33   , Urinalysis:   Results from last 7 days   Lab Units 09/19/22  1223   COLOR UA  Yellow   CLARITY UA  Slightly Cloudy   SPEC GRAV UA  1 020   PH UA  5 5   LEUKOCYTES UA  Negative   NITRITE UA  Negative   GLUCOSE UA mg/dl Negative   KETONES UA mg/dl Negative   BILIRUBIN UA  Small*   BLOOD UA  Large*   , Drug Screen:   Results from last 7 days   Lab Units 09/19/22  1222   BARBITURATE UR  Negative   BENZODIAZEPINE UR  Negative   THC UR  Negative   COCAINE UR  Negative   METHADONE URINE  Negative   OPIATE UR  Positive* PCP UR  Negative     Imaging Studies: I have personally reviewed pertinent reports  and I have personally reviewed pertinent films in PACS     EEG, Pathology, and Other Studies: I have personally reviewed pertinent reports  and I have personally reviewed pertinent films in PACS    VTE Prophylaxis: Sequential compression device (Venodyne)      Counseling / Coordination of Care  Total time spent today 25 minutes  Greater than 50% of total time was spent with the patient and / or family counseling and / or coordination of care  A description of the counseling / coordination of care: Coordination of care and discussion of plan of care with Hospitalist Attending and RN

## 2022-09-23 NOTE — ASSESSMENT & PLAN NOTE
· Elevated D-dimer may be related to fall  · Lower extremity duplex negative for DVT  · Renal dysfunction cannot check CTA of chest but doubt PE    · Discontinued heparin infusion and transitioned to prophylaxis dose

## 2022-09-23 NOTE — UTILIZATION REVIEW
Continued Stay Review    Date: 9/23/22                          Current Patient Class: Inpatient  Current Level of Care: Med Surg    HPI:52 y o  female initially admitted on 9/19     Assessment/Plan: Neurology consulted for persistent encephalopathy  MRI without acute pathology  Checking EEG and LP  Continue IV fluids for acute renal failure, holding off on dialysis if possible  Vital Signs:     Date/Time Temp Pulse Resp BP MAP (mmHg) SpO2 O2 Device   09/23/22 08:27:44 97 9 °F (36 6 °C) 107 Abnormal  16 145/91 109 96 % --   09/22/22 2219 -- -- -- -- -- 97 % None (Room air)   09/22/22 21:51:06 98 °F (36 7 °C) 105 19 146/87 107 94 % --   09/22/22 2100 -- 106 Abnormal  -- -- -- -- --   09/22/22 15:10:56 97 7 °F (36 5 °C) -- 16 140/86 104 -- --   09/22/22 06:54:29 97 7 °F (36 5 °C) 102 15 139/88 105 94 % --   09/21/22 21:57:17 98 °F (36 7 °C) 107 Abnormal  18 145/89 108 94 % --   09/21/22 2000 -- -- -- -- -- -- None (Room air)   09/21/22 14:48:55 -- 99 16 136/86 103 95 % --   09/21/22 07:36:34 97 4 °F (36 3 °C) Abnormal  97 -- 136/86 103 91 % --         Pertinent Labs/Diagnostic Results:     9/23 MRI brain:  IMPRESSION:     No evidence of recent infarct  No mass effect or midline shift  Study performed in the limited fashion with extensive motion artifact      Results from last 7 days   Lab Units 09/19/22  1401   SARS-COV-2  Negative     Results from last 7 days   Lab Units 09/23/22  0530 09/21/22  0550 09/19/22  0829   WBC Thousand/uL 7 64 5 97 9 38   HEMOGLOBIN g/dL 11 5 12 2 12 6   HEMATOCRIT % 36 2 37 7 38 4   PLATELETS Thousands/uL 312 329 397*   NEUTROS ABS Thousands/µL  --   --  7 99*         Results from last 7 days   Lab Units 09/23/22  0824 09/23/22  0530 09/23/22  0132 09/22/22  1656 09/22/22  0756 09/21/22  0905 09/21/22  0550   SODIUM mmol/L 137 138 136 135 136   < >  --    POTASSIUM mmol/L 5 4* 4 7 4 9 4 6 4 7   < >  --    CHLORIDE mmol/L 104 104 106 104 102   < >  --    CO2 mmol/L 20* 20* 19* 20* 21 < >  --    ANION GAP mmol/L 13 14* 11 11 13   < >  --    BUN mg/dL 54* 52* 54* 52* 51*   < >  --    CREATININE mg/dL 6 98* 7 15* 7 15* 7 10* 6 81*   < >  --    EGFR ml/min/1 73sq m 6 5 5 5 6   < >  --    CALCIUM mg/dL 8 3 8 1* 8 5 8 5 8 2*   < >  --    PHOSPHORUS mg/dL  --   --   --   --   --   --  7 2*    < > = values in this interval not displayed       Results from last 7 days   Lab Units 09/23/22  0530 09/21/22  0550 09/20/22  0606 09/19/22  0829   AST U/L 167* 489* 970* 1,683*   ALT U/L 292* 407* 512* 674*   ALK PHOS U/L 69 73 79 98   TOTAL PROTEIN g/dL 4 9* 5 0* 5 4* 5 7*   ALBUMIN g/dL 1 7* 1 6* 1 8* 2 3*   TOTAL BILIRUBIN mg/dL 0 40 0 30 0 35 0 58   BILIRUBIN DIRECT mg/dL  --  0 13 0 15  --    AMMONIA umol/L  --   --   --  33     Results from last 7 days   Lab Units 09/20/22  1154   POC GLUCOSE mg/dl 90     Results from last 7 days   Lab Units 09/23/22  0824 09/23/22  0530 09/23/22  0132 09/22/22  1656 09/22/22  0756 09/21/22  1415 09/21/22  0905 09/20/22  2326 09/20/22  1526 09/20/22  0909 09/19/22  2350 09/19/22  1708   GLUCOSE RANDOM mg/dL 68 69 73 79 75 86 92 100 102 88 62* 59*     Results from last 7 days   Lab Units 09/20/22  0606   OSMOLALITY, SERUM mmol/         No results found for: BETA-HYDROXYBUTYRATE       Results from last 7 days   Lab Units 09/19/22  1220   PH CHARLES  7 278*   PCO2 CHARLES mm Hg 51 0*   PO2 CHARLES mm Hg 34 8*   HCO3 CHARLES mmol/L 23 3*   BASE EXC CHARLES mmol/L -3 9   O2 CONTENT CHARLES ml/dL 11 4   O2 HGB, VENOUS % 61 5         Results from last 7 days   Lab Units 09/23/22  0530 09/22/22  0756 09/21/22  0550   CK TOTAL U/L 1,482* 2,468* 5,033*   CK MB INDEX % 2 1 2 2 1 7   CK MB ng/mL 31 8* 53 7* 85 7*     Results from last 7 days   Lab Units 09/19/22  1220 09/19/22  1047 09/19/22  0829   HS TNI 0HR ng/L  --   --  80*   HS TNI 2HR ng/L  --  73*  --    HSTNI D2 ng/L  --  -7  --    HS TNI 4HR ng/L 84*  --   --    HSTNI D4 ng/L 4  --   --      Results from last 7 days   Lab Units 09/19/22  0829 D-DIMER QUANTITATIVE ug/ml FEU 3 84*     Results from last 7 days   Lab Units 09/23/22  0530 09/22/22  0756 09/21/22  1415 09/21/22  0550 09/19/22  1708 09/19/22  1047   PROTIME seconds 13 5  --   --   --   --  14 4   INR  1 03  --   --   --   --  1 11   PTT seconds  --  79* 67* 111*   < > 27    < > = values in this interval not displayed       Results from last 7 days   Lab Units 09/19/22  0829   TSH 3RD GENERATON uIU/mL 3 047     Results from last 7 days   Lab Units 09/20/22  0606   PROCALCITONIN ng/ml 0 31*     Results from last 7 days   Lab Units 09/19/22  1222   LACTIC ACID mmol/L 0 4*           Results from last 7 days   Lab Units 09/20/22  0606   HEP B S AG  Non-reactive   HEP C AB  Non-reactive   HEP B C IGM  Non-reactive                 Results from last 7 days   Lab Units 09/20/22  0606 09/19/22  1222   OSMOLALITY, SERUM mmol/  --    OSMO UR mmol/KG  --  208*     Results from last 7 days   Lab Units 09/19/22  1223 09/19/22  1222   CLARITY UA  Slightly Cloudy  --    COLOR UA  Yellow  --    SPEC GRAV UA  1 020  --    PH UA  5 5  --    GLUCOSE UA mg/dl Negative  --    KETONES UA mg/dl Negative  --    BLOOD UA  Large*  --    PROTEIN UA mg/dl Trace*  --    NITRITE UA  Negative  --    BILIRUBIN UA  Small*  --    UROBILINOGEN UA E U /dl 0 2  --    LEUKOCYTES UA  Negative  --    WBC UA /hpf None Seen  --    RBC UA /hpf 0-1*  --    BACTERIA UA /hpf None Seen  --    EPITHELIAL CELLS WET PREP /hpf Occasional  --    SODIUM UR   --  22     Results from last 7 days   Lab Units 09/19/22  1401   INFLUENZA A PCR  Negative   INFLUENZA B PCR  Negative   RSV PCR  Negative         Results from last 7 days   Lab Units 09/19/22  1222   AMPH/METH  Negative   BARBITURATE UR  Negative   BENZODIAZEPINE UR  Negative   COCAINE UR  Negative   METHADONE URINE  Negative   OPIATE UR  Positive*   PCP UR  Negative   THC UR  Negative     Results from last 7 days   Lab Units 09/19/22  0829   ETHANOL LVL mg/dL <3   ACETAMINOPHEN LVL ug/mL <2*   SALICYLATE LVL mg/dL 4 7           Medications:   Scheduled Medications:  LORazepam, 1 mg, Intravenous, On Call  nicotine, 1 patch, Transdermal, Daily  QUEtiapine, 25 mg, Oral, HS  venlafaxine, 150 mg, Oral, Daily      Continuous IV Infusions:  sodium chloride, 125 mL/hr, Intravenous, Continuous      PRN Meds:  acetaminophen, 650 mg, Oral, Q6H PRN  ondansetron, 4 mg, Intravenous, Q4H PRN        Discharge Plan: D    Network Utilization Review Department  ATTENTION: Please call with any questions or concerns to [phone] and carefully listen to the prompts so that you are directed to the right person  All voicemails are confidential   Armida Oliveros all requests for admission clinical reviews, approved or denied determinations and any other requests to dedicated fax number below belonging to the campus where the patient is receiving treatment   List of dedicated fax numbers for the Facilities:  [address] DENIALS (Administrative/Medical Necessity) [phone]   [address] (Maternity/NICU/Pediatrics) [phone]   5 Minda Baldwin [phone]   [facility] [phone]   1306 [facility] 150 [facility] [place] [address] [address] Wanda Miller 28 [phone]   1554 [facility] [place] [address] [address] [phone]

## 2022-09-23 NOTE — PROCEDURES
Lumbar puncture    Date/Time: 9/23/2022 3:50 PM  Performed by: Anthony Suarez PA-C  Authorized by: Anthony Suarez PA-C   [place] Protocol:  Consent: Verbal consent obtained  Consent given by: spouse  Time out: Immediately prior to procedure a "time out" was called to verify the correct patient, procedure, equipment, support staff and site/side marked as required  Patient identity confirmed: arm band      Patient location:  Bedside  Pre-procedure details:     Preparation: Patient was prepped and draped in usual sterile fashion    Indications:     Indications: evaluation for altered mental status    Sedation:     Sedation type: Anxiolysis  Anesthesia (see MAR for exact dosages): Anesthesia method:  Local infiltration    Local anesthetic:  Lidocaine 1% w/o epi  Procedure details:     Lumbar space:  L3-L4 interspace    Needle gauge:  20G x 3 5in    Needle type:  Spinal needle - Quincke tip    Number of attempts:  2    Opening pressure (cm H2O):  26    Fluid appearance:  Clear    Tubes of fluid:  4    Total volume (ml):  18  Post-procedure:     Puncture site:  Adhesive bandage applied and direct pressure applied    Patient tolerance of procedure:   Tolerated well, no immediate complications

## 2022-09-24 LAB
ALBUMIN SERPL BCP-MCNC: 1.7 G/DL (ref 3.5–5)
ALP SERPL-CCNC: 67 U/L (ref 46–116)
ALT SERPL W P-5'-P-CCNC: 240 U/L (ref 12–78)
ANION GAP SERPL CALCULATED.3IONS-SCNC: 14 MMOL/L (ref 4–13)
AST SERPL W P-5'-P-CCNC: 111 U/L (ref 5–45)
BILIRUB SERPL-MCNC: 0.41 MG/DL (ref 0.2–1)
BUN SERPL-MCNC: 52 MG/DL (ref 5–25)
CALCIUM ALBUM COR SERPL-MCNC: 10 MG/DL (ref 8.3–10.1)
CALCIUM SERPL-MCNC: 8.2 MG/DL (ref 8.3–10.1)
CHLORIDE SERPL-SCNC: 108 MMOL/L (ref 96–108)
CK MB SERPL-MCNC: 2.8 % (ref 0–2.5)
CK MB SERPL-MCNC: 25.4 NG/ML (ref 0–5)
CK SERPL-CCNC: 905 U/L (ref 26–192)
CO2 SERPL-SCNC: 18 MMOL/L (ref 21–32)
CREAT SERPL-MCNC: 7.55 MG/DL (ref 0.6–1.3)
ERYTHROCYTE [DISTWIDTH] IN BLOOD BY AUTOMATED COUNT: 13.6 % (ref 11.6–15.1)
GFR SERPL CREATININE-BSD FRML MDRD: 5 ML/MIN/1.73SQ M
GLUCOSE SERPL-MCNC: 64 MG/DL (ref 65–140)
HCT VFR BLD AUTO: 35.4 % (ref 34.8–46.1)
HGB BLD-MCNC: 11.1 G/DL (ref 11.5–15.4)
MCH RBC QN AUTO: 32.9 PG (ref 26.8–34.3)
MCHC RBC AUTO-ENTMCNC: 31.4 G/DL (ref 31.4–37.4)
MCV RBC AUTO: 105 FL (ref 82–98)
PLATELET # BLD AUTO: 297 THOUSANDS/UL (ref 149–390)
PMV BLD AUTO: 9.6 FL (ref 8.9–12.7)
POTASSIUM SERPL-SCNC: 4.6 MMOL/L (ref 3.5–5.3)
PROT SERPL-MCNC: 4.9 G/DL (ref 6.4–8.4)
RBC # BLD AUTO: 3.37 MILLION/UL (ref 3.81–5.12)
SODIUM SERPL-SCNC: 140 MMOL/L (ref 135–147)
WBC # BLD AUTO: 7.44 THOUSAND/UL (ref 4.31–10.16)

## 2022-09-24 RX ORDER — LORAZEPAM 0.5 MG/1
0.5 TABLET ORAL ONCE
Status: COMPLETED | OUTPATIENT
Start: 2022-09-24 | End: 2022-09-24

## 2022-09-24 RX ADMIN — HYDROMORPHONE HYDROCHLORIDE 0.5 MG: 1 INJECTION, SOLUTION INTRAMUSCULAR; INTRAVENOUS; SUBCUTANEOUS at 17:09

## 2022-09-24 RX ADMIN — VENLAFAXINE HYDROCHLORIDE 150 MG: 150 CAPSULE, EXTENDED RELEASE ORAL at 08:00

## 2022-09-24 RX ADMIN — QUETIAPINE FUMARATE 25 MG: 25 TABLET ORAL at 21:52

## 2022-09-24 RX ADMIN — LORAZEPAM 0.5 MG: 0.5 TABLET ORAL at 08:00

## 2022-09-24 RX ADMIN — SODIUM BICARBONATE 125 ML/HR: 84 INJECTION, SOLUTION INTRAVENOUS at 23:17

## 2022-09-24 RX ADMIN — SODIUM BICARBONATE 125 ML/HR: 84 INJECTION, SOLUTION INTRAVENOUS at 13:05

## 2022-09-24 RX ADMIN — Medication 1 PATCH: at 08:00

## 2022-09-24 NOTE — ASSESSMENT & PLAN NOTE
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

## 2022-09-24 NOTE — ASSESSMENT & PLAN NOTE
· ANUJA/ATN secondary to rhabdomyolysis  No evidence of renal obstruction on imaging  · Appreciate nephrology evaluation  Awaiting renal recovery  Continue IV fluids with sodium bicarbonate    · No emergent need for hemodialysis    Results from last 7 days   Lab Units 09/24/22  0548 09/23/22  0824 09/23/22  0530 09/23/22  0132 09/22/22  1656 09/22/22  0756 09/21/22  1415 09/21/22  0905 09/20/22  2326   BUN mg/dL 52* 54* 52* 54* 52* 51* 54* 52* 52*   CREATININE mg/dL 7 55* 6 98* 7 15* 7 15* 7 10* 6 81* 6 74* 6 72* 6 53*   EGFR ml/min/1 73sq m 5 6 5 5 5 6 6 6 6

## 2022-09-24 NOTE — ASSESSMENT & PLAN NOTE
· Secondary to rhabdomyolysis  No evidence of liver injury on CT imaging  · Seen by GI  Hepatic duplex also negative      Results from last 7 days   Lab Units 09/24/22  0548 09/23/22  0530 09/21/22  0550 09/20/22  0606 09/19/22  0829   AST U/L 111* 167* 489* 970* 1,683*   ALT U/L 240* 292* 407* 512* 674*   TOTAL BILIRUBIN mg/dL 0 41 0 40 0 30 0 35 0 58

## 2022-09-24 NOTE — PLAN OF CARE
Problem: PAIN - ADULT  Goal: Verbalizes/displays adequate comfort level or baseline comfort level  Description: Interventions:  - Encourage patient to monitor pain and request assistance  - Assess pain using appropriate pain scale  - Administer analgesics based on type and severity of pain and evaluate response  - Implement non-pharmacological measures as appropriate and evaluate response  - Consider cultural and social influences on pain and pain management  - Notify physician/advanced practitioner if interventions unsuccessful or patient reports new pain  Outcome: Progressing     Problem: INFECTION - ADULT  Goal: Absence or prevention of progression during hospitalization  Description: INTERVENTIONS:  - Assess and monitor for signs and symptoms of infection  - Monitor lab/diagnostic results  - Monitor all insertion sites, i e  indwelling lines, tubes, and drains  - Monitor endotracheal if appropriate and nasal secretions for changes in amount and color  - Welch appropriate cooling/warming therapies per order  - Administer medications as ordered  - Instruct and encourage patient and family to use good hand hygiene technique  - Identify and instruct in appropriate isolation precautions for identified infection/condition  Outcome: Progressing  Goal: Absence of fever/infection during neutropenic period  Description: INTERVENTIONS:  - Monitor WBC    Outcome: Progressing     Problem: SAFETY ADULT  Goal: Patient will remain free of falls  Description: INTERVENTIONS:  - Educate patient/family on patient safety including physical limitations  - Instruct patient to call for assistance with activity   - Consult OT/PT to assist with strengthening/mobility   - Keep Call bell within reach  - Keep bed low and locked with side rails adjusted as appropriate  - Keep care items and personal belongings within reach  - Initiate and maintain comfort rounds  - Make Fall Risk Sign visible to staff  - Offer Toileting every  Hours, in advance of need  - Initiate/Maintain alarm  - Obtain necessary fall risk management equipment: - Apply yellow socks and bracelet for high fall risk patients  - Consider moving patient to room near nurses station  Outcome: Progressing  Goal: Maintain or return to baseline ADL function  Description: INTERVENTIONS:  -  Assess patient's ability to carry out ADLs; assess patient's baseline for ADL function and identify physical deficits which impact ability to perform ADLs (bathing, care of mouth/teeth, toileting, grooming, dressing, etc )  - Assess/evaluate cause of self-care deficits   - Assess range of motion  - Assess patient's mobility; develop plan if impaired  - Assess patient's need for assistive devices and provide as appropriate  - Encourage maximum independence but intervene and supervise when necessary  - Involve family in performance of ADLs  - Assess for home care needs following discharge   - Consider OT consult to assist with ADL evaluation and planning for discharge  - Provide patient education as appropriate  Outcome: Progressing  Goal: Maintains/Returns to pre admission functional level  Description: INTERVENTIONS:  - Perform BMAT or MOVE assessment daily    - Set and communicate daily mobility goal to care team and patient/family/caregiver  - Collaborate with rehabilitation services on mobility goals if consulted  - Perform Range of Motion  times a day  - Reposition patient every  hours    - Dangle patient  times a day  - Stand patient  times a day  - Ambulate patient  times a day  - Out of bed to chair  times a day   - Out of bed for meals  times a day  - Out of bed for toileting  - Record patient progress and toleration of activity level   Outcome: Progressing     Problem: DISCHARGE PLANNING  Goal: Discharge to home or other facility with appropriate resources  Description: INTERVENTIONS:  - Identify barriers to discharge w/patient and caregiver  - Arrange for needed discharge resources and transportation as appropriate  - Identify discharge learning needs (meds, wound care, etc )  - Arrange for interpretive services to assist at discharge as needed  - Refer to Case Management Department for coordinating discharge planning if the patient needs post-hospital services based on physician/advanced practitioner order or complex needs related to functional status, cognitive ability, or social support system  Outcome: Progressing     Problem: Knowledge Deficit  Goal: Patient/family/caregiver demonstrates understanding of disease process, treatment plan, medications, and discharge instructions  Description: Complete learning assessment and assess knowledge base    Interventions:  - Provide teaching at level of understanding  - Provide teaching via preferred learning methods  Outcome: Progressing     Problem: Potential for Falls  Goal: Patient will remain free of falls  Description: INTERVENTIONS:  - Educate patient/family on patient safety including physical limitations  - Instruct patient to call for assistance with activity   - Consult OT/PT to assist with strengthening/mobility   - Keep Call bell within reach  - Keep bed low and locked with side rails adjusted as appropriate  - Keep care items and personal belongings within reach  - Initiate and maintain comfort rounds  - Make Fall Risk Sign visible to staff  - Offer Toileting every  Hours, in advance of need  - Initiate/Maintain alarm  - Obtain necessary fall risk management equipment:   - Apply yellow socks and bracelet for high fall risk patients  - Consider moving patient to room near nurses station  Outcome: Progressing     Problem: MOBILITY - ADULT  Goal: Maintain or return to baseline ADL function  Description: INTERVENTIONS:  -  Assess patient's ability to carry out ADLs; assess patient's baseline for ADL function and identify physical deficits which impact ability to perform ADLs (bathing, care of mouth/teeth, toileting, grooming, dressing, etc )  - Assess/evaluate cause of self-care deficits   - Assess range of motion  - Assess patient's mobility; develop plan if impaired  - Assess patient's need for assistive devices and provide as appropriate  - Encourage maximum independence but intervene and supervise when necessary  - Involve family in performance of ADLs  - Assess for home care needs following discharge   - Consider OT consult to assist with ADL evaluation and planning for discharge  - Provide patient education as appropriate  Outcome: Progressing  Goal: Maintains/Returns to pre admission functional level  Description: INTERVENTIONS:  - Perform BMAT or MOVE assessment daily    - Set and communicate daily mobility goal to care team and patient/family/caregiver  - Collaborate with rehabilitation services on mobility goals if consulted  - Perform Range of Motion  times a day  - Reposition patient every  hours    - Dangle patient  times a day  - Stand patient  times a day  - Ambulate patient  times a day  - Out of bed to chair  times a day   - Out of bed for meals times a day  - Out of bed for toileting  - Record patient progress and toleration of activity level   Outcome: Progressing     Problem: Prexisting or High Potential for Compromised Skin Integrity  Goal: Skin integrity is maintained or improved  Description: INTERVENTIONS:  - Identify patients at risk for skin breakdown  - Assess and monitor skin integrity  - Assess and monitor nutrition and hydration status  - Monitor labs   - Assess for incontinence   - Turn and reposition patient  - Assist with mobility/ambulation  - Relieve pressure over bony prominences  - Avoid friction and shearing  - Provide appropriate hygiene as needed including keeping skin clean and dry  - Evaluate need for skin moisturizer/barrier cream  - Collaborate with interdisciplinary team   - Patient/family teaching  - Consider wound care consult   Outcome: Progressing     Problem: Nutrition/Hydration-ADULT  Goal: Nutrient/Hydration intake appropriate for improving, restoring or maintaining nutritional needs  Description: Monitor and assess patient's nutrition/hydration status for malnutrition  Collaborate with interdisciplinary team and initiate plan and interventions as ordered  Monitor patient's weight and dietary intake as ordered or per policy  Utilize nutrition screening tool and intervene as necessary  Determine patient's food preferences and provide high-protein, high-caloric foods as appropriate       INTERVENTIONS:  - Monitor oral intake, urinary output, labs, and treatment plans  - Assess nutrition and hydration status and recommend course of action  - Evaluate amount of meals eaten  - Assist patient with eating if necessary   - Allow adequate time for meals  - Recommend/ encourage appropriate diets, oral nutritional supplements, and vitamin/mineral supplements  - Order, calculate, and assess calorie counts as needed  - Recommend, monitor, and adjust tube feedings and TPN/PPN based on assessed needs  - Assess need for intravenous fluids  - Provide specific nutrition/hydration education as appropriate  - Include patient/family/caregiver in decisions related to nutrition  Outcome: Progressing

## 2022-09-24 NOTE — QUICK NOTE
QUICK NOTE - Deterioration Index  Cristal Braden 45 y o  female MRN: [id number]  Unit/Bed#: [place] [facility] 2 [place] [facility] 217-01 Encounter: [id number]    Date Paged: 22  Time Paged: 0019  Room #: [facility]  Arrival Time: 0030  Deterioration index score at time of page: 63 45  %  Spoke with Nancy Dooley PAC from primary team  Need to escalate level of care: no     PROBLEMS resulting in high DI score:   25% Grant coma scale 12   17% Supplemental oxygen Nasal cannula   14% Neurological exam Lethargic (Drowsy, easily aroused), Yes   15 Age 45years old   9% Respiratory rate 20     • Acute encephalopathy likely metabolic vs toxic medication induced   • Rhabdomyolysis and ANUJA -- CK has trended down considerably; remains uremic    PLAN:    • Neurology has been consulted by primary team -- had MRI, no obvious pathology  LP done today -- Meningitis/Encephalitis panel negative, no bacteria seen, CSF protein 48, CSF glucose 54, CSF WBC none  • Patient GCS 12 ([id number])  She is quite alert in bed  Nursing states that this has been her baseline over the past several shifts  • SpO2 94% on room air  • Lungs clear  • Plan as per primary team     Please contact critical care via [employer] with any questions or concerns       Vitals:   Vitals:    22 0532 22 0827 22 1606 22 2145   BP:  145/91 147/93 133/97   BP [place]   Right arm    Pulse:  (!) 107 (!) 114 (!) 113   Resp:  16 14 20   Temp:  97 9 °F (36 6 °C) (!) 97 3 °F (36 3 °C) 98 2 °F (36 8 °C)   TempSrc:   Axillary    SpO2:  96% 93% 95%   Weight: 69 6 kg (153 lb 7 oz)      Height:           Respiratory:  SpO2: SpO2: 95 %, SpO2 Activity: SpO2 Activity: At Rest, SpO2 Device: O2 Device:Room Air      Temperature: Temp (24hrs), Av 8 °F (36 6 °C), Min:97 3 °F (36 3 °C), Max:98 2 °F (36 8 °C)  Current: Temperature: 98 2 °F (36 8 °C)    Labs:   Results from last 7 days   Lab Units 22  0530 22  0550 22  0829   WBC Thousand/uL 7 64 5 97 9 38   HEMOGLOBIN g/dL 11 5 12 2 12 6   HEMATOCRIT % 36 2 37 7 38 4   PLATELETS Thousands/uL 312 329 397*   NEUTROS PCT %  --   --  85*   MONOS PCT %  --   --  4     Results from last 7 days   Lab Units 09/23/22  0824 09/23/22  0530 09/23/22  0132 09/21/22  0905 09/21/22  0550 09/20/22  0909 09/20/22  0606   SODIUM mmol/L 137 138 136   < >  --    < >  --    POTASSIUM mmol/L 5 4* 4 7 4 9   < >  --    < >  --    CHLORIDE mmol/L 104 104 106   < >  --    < >  --    CO2 mmol/L 20* 20* 19*   < >  --    < >  --    BUN mg/dL 54* 52* 54*   < >  --    < >  --    CREATININE mg/dL 6 98* 7 15* 7 15*   < >  --    < >  --    CALCIUM mg/dL 8 3 8 1* 8 5   < >  --    < >  --    ALK PHOS U/L  --  69  --   --  73  --  79   ALT U/L  --  292*  --   --  407*  --  512*   AST U/L  --  167*  --   --  489*  --  970*    < > = values in this interval not displayed           Results from last 7 days   Lab Units 09/19/22  1222   LACTIC ACID mmol/L 0 4*         Results from last 7 days   Lab Units 09/20/22  0606   PROCALCITONIN ng/ml 0 31*       Code Status: Level 1 - Full Code

## 2022-09-24 NOTE — ASSESSMENT & PLAN NOTE
· Acute encephalopathy appears secondary to gabapentin and morphine with subsequent renal failure  · Also had hypoglycemia requiring D50  No further hypoglycemia  · Holding both gabapentin and morphine since admission  · Neurology consulted for per persistent encephalopathy     · Patient declined EEG   · LP and MRI negative for acute pathology

## 2022-09-24 NOTE — PROGRESS NOTES
[address] [facility]  Progress Note - [facility] 1970, 45 y o  female MRN: [id number]  Unit/Bed#: [place] [address] -01 Encounter: [id number]  Primary Care Provider: Merrill Latham MD   Date and time admitted to hospital: 9/19/2022  8:04 AM    * Encephalopathy acute  Assessment & Plan  · Acute encephalopathy appears secondary to gabapentin and morphine with subsequent renal failure  · Also had hypoglycemia requiring D50  No further hypoglycemia  · Holding both gabapentin and morphine since admission  · Neurology consulted for per persistent encephalopathy  · Patient declined EEG   · LP and MRI negative for acute pathology    ARF (acute renal failure) ([facility], [place] [address] )  Assessment & Plan  · ANUJA/ATN secondary to rhabdomyolysis  No evidence of renal obstruction on imaging  · Appreciate nephrology evaluation  Awaiting renal recovery  Continue IV fluids with sodium bicarbonate  · No emergent need for hemodialysis    Results from last 7 days   Lab Units 09/24/22  0548 09/23/22  0824 09/23/22  0530 09/23/22  0132 09/22/22  1656 09/22/22  0756 09/21/22  1415 09/21/22  0905 09/20/22  2326   BUN mg/dL 52* 54* 52* 54* 52* 51* 54* 52* 52*   CREATININE mg/dL 7 55* 6 98* 7 15* 7 15* 7 10* 6 81* 6 74* 6 72* 6 53*   EGFR ml/min/1 73sq m 5 6 5 5 5 6 6 6 6       Transaminitis  Assessment & Plan  · Secondary to rhabdomyolysis  No evidence of liver injury on CT imaging  · Seen by GI  Hepatic duplex also negative  Results from last 7 days   Lab Units 09/24/22  0548 09/23/22  0530 09/21/22  0550 09/20/22  0606 09/19/22  0829   AST U/L 111* 167* 489* 970* 1,683*   ALT U/L 240* 292* 407* 512* 674*   TOTAL BILIRUBIN mg/dL 0 41 0 40 0 30 0 35 0 58       Leg edema, right  Assessment & Plan  · Likely source of rhabdomyolysis  No evidence of compartment syndrome fracture or DVT on imaging  · 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    · Given persistent swelling, will consult ortho    D-dimer, elevated  Assessment & Plan  · Elevated D-dimer may be related to fall  · Lower extremity duplex negative for DVT  · Renal dysfunction cannot check CTA of chest but doubt PE  · Discontinued heparin infusion and transitioned to prophylaxis dose    Traumatic rhabdomyolysis [facility])  Assessment & Plan  · Secondary to fall last Friday  IV fluids per Nephrology  Results from last 7 days   Lab Units 09/24/22  0548 09/23/22  0530 09/22/22  0756 09/21/22  0550 09/20/22  0606 09/19/22  0829   CK TOTAL U/L 905* 1,482* 2,468* 5,033* 14,147* 25,110*       Abnormal CT of the chest  Assessment & Plan  · Left-sided opacities noted  COVID negative  · Procalcitonin only minimally elevated doubt active infection    Results from last 7 days   Lab Units 09/20/22  0606   PROCALCITONIN ng/ml 0 31*       GERD (gastroesophageal reflux disease)  Assessment & Plan  · Holding PPI given renal injury    DDD (degenerative disc disease), lumbosacral  Assessment & Plan  · Lumbar radiculopathy due for surgery  · Prior to admission was on gabapentin and morphine IR 15 mg   · Confirmed on   Holding both for now given kidney injury  Tobacco abuse  Assessment & Plan  · Nicotine patch ordered    Depression  Assessment & Plan  · Continue venlafaxine and quetiapine      VTE Pharmacologic Prophylaxis:  Moderate Risk (Score 3-4) - Pharmacological DVT Prophylaxis Ordered: heparin  Patient Centered Rounds: I have performed bedside rounds with nursing staff today  Discussions with Specialists or Other Care Team Provider:  Nephrology    Education and Discussions with Family / Patient: Updated  () at bedside  Time Spent for Care: 20 mins  More than 50% of total time spent on counseling and coordination of care as described above      Current Length of Stay: 5 day(s)  Current Patient Status: Inpatient   Certification Statement: The patient will continue to require additional inpatient hospital stay due to encephalopathy and renal failure  Discharge Plan / Estimated Discharge Date: Anticipate discharge in >72 hrs to rehab facility  Code Status: Level 1 - Full Code      Subjective:   Patient seen and examined  Remains confused, read out IVs last night    Objective:   Vitals: Blood pressure (!) 147/103, pulse (!) 116, temperature (!) 97 3 °F (36 3 °C), resp  rate 18, height 5' 6" (1 676 m), weight 69 5 kg (153 lb 3 5 oz), last menstrual period 03/14/2019, SpO2 96 %  Intake/Output Summary (Last 24 hours) at 9/24/2022 1512  Last data filed at 9/24/2022 0700  Gross per 24 hour   Intake --   Output 800 ml   Net -800 ml       Physical Exam  Vitals reviewed  Constitutional:       General: She is not in acute distress  HENT:      Head: Atraumatic  Eyes:      General: No scleral icterus  Extraocular Movements: Extraocular movements intact  Cardiovascular:      Rate and Rhythm: Regular rhythm  Tachycardia present  Pulmonary:      Breath sounds: Decreased breath sounds present  No wheezing  Abdominal:      General: Bowel sounds are normal       Palpations: Abdomen is soft  Tenderness: There is no guarding or rebound  Musculoskeletal:         General: No swelling  Skin:     General: Skin is warm  Neurological:      Mental Status: She is disoriented  Motor: Weakness (Right leg) present     Psychiatric:         Mood and Affect: Mood normal        Additional Data:   Labs:  Results from last 7 days   Lab Units 09/24/22  0548 09/23/22  0530 09/21/22  0550 09/19/22  1047   WBC Thousand/uL 7 44 7 64 5 97  --    HEMOGLOBIN g/dL 11 1* 11 5 12 2  --    PLATELETS Thousands/uL 297 312 329  --    MCV fL 105* 105* 102*  --    INR   --  1 03  --  1 11     Results from last 7 days   Lab Units 09/24/22  0548 09/23/22  0824 09/23/22  0530 09/21/22  0905 09/21/22  0550   SODIUM mmol/L 140 137 138   < >  --    POTASSIUM mmol/L 4 6 5 4* 4 7   < >  --    CHLORIDE mmol/L 108 104 104   < >  --    CO2 mmol/L 18* 20* 20*   < >  -- ANION GAP mmol/L 14* 13 14*   < >  --    BUN mg/dL 52* 54* 52*   < >  --    CREATININE mg/dL 7 55* 6 98* 7 15*   < >  --    CALCIUM mg/dL 8 2* 8 3 8 1*   < >  --    ALBUMIN g/dL 1 7*  --  1 7*  --  1 6*   TOTAL BILIRUBIN mg/dL 0 41  --  0 40  --  0 30   ALK PHOS U/L 67  --  69  --  73   ALT U/L 240*  --  292*  --  407*   AST U/L 111*  --  167*  --  489*   EGFR ml/min/1 73sq m 5 6 5   < >  --    GLUCOSE RANDOM mg/dL 64* 68 69   < >  --     < > = values in this interval not displayed  Results from last 7 days   Lab Units 09/21/22  0550   PHOSPHORUS mg/dL 7 2*     Results from last 7 days   Lab Units 09/24/22  0548 09/23/22  0530 09/22/22  0756   CK TOTAL U/L 905* 1,482* 2,468*   CK MB INDEX % 2 8* 2 1 2 2     Results from last 7 days   Lab Units 09/19/22  1220 09/19/22  1047 09/19/22  0829   HS TNI 0HR ng/L  --   --  80*   HS TNI 2HR ng/L  --  73*  --    HS TNI 4HR ng/L 84*  --   --           Results from last 7 days   Lab Units 09/20/22  0606 09/19/22  1222   LACTIC ACID mmol/L  --  0 4*   PROCALCITONIN ng/ml 0 31*  --      Results from last 7 days   Lab Units 09/20/22  1154   POC GLUCOSE mg/dl 90         Results from last 7 days   Lab Units 09/19/22  0829   TSH 3RD GENERATON uIU/mL 3 047     * I Have Reviewed All Lab Data Listed Above      Cultures:   Results from last 7 days   Lab Units 09/23/22  1608 09/19/22  1401   GRAM STAIN RESULT  No Polys or Bacteria seen  --    INFLUENZA A PCR   --  Negative       Results from last 7 days   Lab Units 09/19/22  1401   SARS-COV-2  Negative   INFLUENZA A PCR  Negative   INFLUENZA B PCR  Negative   RSV PCR  Negative           Lines/Drains:  Invasive Devices  Report    Peripheral Intravenous Line  Duration           Peripheral IV 09/24/22 Right Antecubital <1 day          Drain  Duration           Urethral Catheter 16 Fr  4 days              Telemetry:      Imaging:  Imaging Reports Reviewed Today Include:   CT chest abdomen pelvis wo contrast    Result Date: 9/19/2022  Impression: Bilateral groundglass opacities with superimposed inter and intralobular septal thickening  Differential diagnosis includes pulmonary hemorrhage, infection, and pulmonary edema, although the distribution is atypical for edema  The study was marked in [facility] for immediate notification  Workstation performed: [id number]     XR hip/pelv 2-3 vws right    Result Date: 9/19/2022  Impression: No acute osseous abnormality  Workstation performed: [id number]     XR ankle 3+ views RIGHT    Result Date: 9/19/2022  Impression: No acute osseous abnormality  Workstation performed: [id number]     XR foot 3+ views RIGHT    Result Date: 9/19/2022  Impression: No acute osseous abnormality  Workstation performed: [id number]     CT head without contrast    Result Date: 9/19/2022  Impression: No acute intracranial process  No skull fracture  Workstation performed: [id number]     CT spine cervical without contrast    Result Date: 9/19/2022  Impression: No cervical spine fracture or traumatic malalignment  Incidental finding of partially visualized subpleural groundglass opacity in the left pulmonary apex presumably scarring  Cannot exclude infiltrate  This study demonstrates a significant  finding and was documented as such in [facility] for liaison and referring practitioner notification  Workstation performed: [id number]     MRI brain wo contrast    Result Date: 9/23/2022  Impression: No evidence of recent infarct  No mass effect or midline shift  Study performed in the limited fashion with extensive motion artifact  Workstation performed: [id number]     XR chest 1 view    Result Date: 9/19/2022  Impression: No acute cardiopulmonary disease  Findings are stable Workstation performed: [id number]     US right upper quadrant with liver dopplers    Result Date: 9/20/2022  Impression: 1  Minimal layering sludge without evidence of acute cholecystitis  2   Normal-appearing liver with normal liver Dopplers   Workstation performed: [id number]     CT lower extremity wo contrast right    Result Date: 9/19/2022  Impression: There is no evidence of intramuscular hematoma or other mass lesion in the right calf  Please note that rhabdomyolysis and compartment syndrome are clinical diagnoses, and are not excluded based on these imaging findings  Workstation performed: [id number]       Scheduled Meds:  Current Facility-Administered Medications   Medication Dose Route Frequency Provider Last Rate   • acetaminophen  650 mg Oral Q6H PRN Kristin Gomez DO     • HYDROmorphone  0 5 mg Intravenous Q4H PRN Kristin Gomez DO     • nicotine  1 patch Transdermal Daily Atul Adan DO     • ondansetron  4 mg Intravenous Q4H PRN Kristin Gomez DO     • QUEtiapine  25 mg Oral HS Atul Adan DO     • sodium bicarbonate infusion  125 mL/hr Intravenous Continuous [facility], [facility] 125 mL/hr (09/24/22 1305)   • venlafaxine  150 mg Oral Daily Kristin Gomez DO         Today, Patient Was Seen By: Kristin Gomez DO    ** Please Note: Dictation voice to text software may have been used in the creation of this document   **

## 2022-09-24 NOTE — PROGRESS NOTES
NEPHROLOGY PROGRESS NOTE   Rommel Figueredo 45 y o  female MRN: [id number]  Unit/Bed#: [place] [address] -01 Encounter: [id number]  Reason for Consult: anuja    ASSESSMENT/PLAN:  1  Acute Kidney Injury, POA- likely due to rhabdomyolysis   - baseline creatinine 0 7-1 0  - creatinine up to 7 55  - UA: large blood, 0-1 rbc, trace protein  - renal imaging: CT- mild nonspecific bilateral perinephric fat stranding, no hydronephrosis or renal calculi  - receiving IVF, change to bicarbonate drip  - urine output 800-900ml/day  - does not appear volume overloaded  - no acute indication for dialysis today, continue to monitor  - hopeful improvement in renal parameters soon  2  Metabolic Acidosis- secondary to ANUJA and large amounts of NSS (chloride load)  - start bicarbonate drip (instead of NSS)  3  Rhabdomyolysis- CPK level significantly improved to 900 from peak 32,000  4  Hyperkalemia- improved, monitor and treat medically as needed  5  Hyponatremia- resolved  6  Transaminitis- improving   7  Chronic Pain- she takes gabapentin 300mg QID  - recommend max dose of 300mg daily due to renal failure    Disposition:  Continue IVF but change to bicarbonate drip    SUBJECTIVE:  Patient unable or unwilling to answer questions  Only moans  Does not appear to want to wake up to answer questions      OBJECTIVE:  Current Weight: Weight - Scale: 69 5 kg (153 lb 3 5 oz)  Vitals:    09/23/22 2145 09/23/22 2310 09/24/22 0600 09/24/22 0751   BP: 133/97   (!) 147/103   BP [place]       Pulse: (!) 113   (!) 116   Resp: 20   18   Temp: 98 2 °F (36 8 °C)   (!) 97 3 °F (36 3 °C)   TempSrc:       SpO2: 95% 94%  96%   Weight:   69 5 kg (153 lb 3 5 oz)    Height:           Intake/Output Summary (Last 24 hours) at 9/24/2022 1315  Last data filed at 9/24/2022 0700  Gross per 24 hour   Intake --   Output 800 ml   Net -800 ml     General: moaning  Skin: no rash, multiple scabs/abrasions   Eyes: anicteric  ENMT: mm dry  Neck: no masses  Respiratory: CTAB  Cardiac: RRR  Extremities: right LE dependent edema  GI: soft nt nd  Neuro: alert but moaning and uncooperative    Medications:    Current Facility-Administered Medications:   •  acetaminophen (TYLENOL) tablet 650 mg, 650 mg, Oral, Q6H PRN, Atul Adan DO, 650 mg at 09/22/22 1040  •  HYDROmorphone (DILAUDID) injection 0 5 mg, 0 5 mg, Intravenous, Q4H PRN, Yaquelin Wells DO  •  nicotine (NICODERM CQ) 14 mg/24hr TD 24 hr patch 1 patch, 1 patch, Transdermal, Daily, Atul Adan DO, 1 patch at 09/24/22 0800  •  ondansetron (ZOFRAN) injection 4 mg, 4 mg, Intravenous, Q4H PRN, Yaquelin Wells DO  •  QUEtiapine (SEROquel) tablet 25 mg, 25 mg, Oral, HS, Atul Adan DO, 25 mg at 09/23/22 2225  •  sodium bicarbonate 75 mEq in sodium chloride 0 45 % 1,000 mL infusion, 125 mL/hr, Intravenous, Continuous, [facility], [facility], Last Rate: 125 mL/hr at 09/24/22 1305, 125 mL/hr at 09/24/22 1305  •  venlafaxine (EFFEXOR-XR) 24 hr capsule 150 mg, 150 mg, Oral, Daily, Atul Adan DO, 150 mg at 09/24/22 0800    Laboratory Results:  Results from last 7 days   Lab Units 09/24/22  0548 09/23/22  0824 09/23/22  0530 09/23/22  0132 09/22/22  1656 09/22/22  0756 09/21/22  1415 09/21/22  0905 09/21/22  0550 09/19/22  1222 09/19/22  0829   WBC Thousand/uL 7 44  --  7 64  --   --   --   --   --  5 97  --  9 38   HEMOGLOBIN g/dL 11 1*  --  11 5  --   --   --   --   --  12 2  --  12 6   HEMATOCRIT % 35 4  --  36 2  --   --   --   --   --  37 7  --  38 4   PLATELETS Thousands/uL 297  --  312  --   --   --   --   --  329  --  397*   POTASSIUM mmol/L 4 6 5 4* 4 7 4 9 4 6 4 7 5 4*   < >  --    < > 5 4*   CHLORIDE mmol/L 108 104 104 106 104 102 98   < >  --    < > 87*   CO2 mmol/L 18* 20* 20* 19* 20* 21 24   < >  --    < > 24   BUN mg/dL 52* 54* 52* 54* 52* 51* 54*   < >  --    < > 53*   CREATININE mg/dL 7 55* 6 98* 7 15* 7 15* 7 10* 6 81* 6 74*   < >  --    < > 5 88*   CALCIUM mg/dL 8 2* 8 3 8 1* 8 5 8 5 8 2* 8 3   < >  --    < > 7 8*   PHOSPHORUS mg/dL  --   --   --   --   --   --   --   --  7 2*  --   --     < > = values in this interval not displayed  I have personally reviewed the blood work as stated above and in my note  I have personally reviewed renal note

## 2022-09-24 NOTE — ASSESSMENT & PLAN NOTE
· Secondary to fall last Friday  IV fluids per Nephrology      Results from last 7 days   Lab Units 09/24/22  0548 09/23/22  0530 09/22/22  0756 09/21/22  0550 09/20/22  0606 09/19/22  0829   CK TOTAL U/L 905* 1,482* 2,468* 5,033* 14,147* 37,339*

## 2022-09-25 ENCOUNTER — APPOINTMENT (INPATIENT)
Dept: NON INVASIVE DIAGNOSTICS | Facility: HOSPITAL | Age: 52
End: 2022-09-25

## 2022-09-25 LAB
ALBUMIN SERPL BCP-MCNC: 1.8 G/DL (ref 3.5–5)
ALP SERPL-CCNC: 67 U/L (ref 46–116)
ALT SERPL W P-5'-P-CCNC: 215 U/L (ref 12–78)
ANION GAP SERPL CALCULATED.3IONS-SCNC: 16 MMOL/L (ref 4–13)
AST SERPL W P-5'-P-CCNC: 82 U/L (ref 5–45)
BILIRUB SERPL-MCNC: 0.42 MG/DL (ref 0.2–1)
BUN SERPL-MCNC: 51 MG/DL (ref 5–25)
CALCIUM ALBUM COR SERPL-MCNC: 10.2 MG/DL (ref 8.3–10.1)
CALCIUM SERPL-MCNC: 8.4 MG/DL (ref 8.3–10.1)
CHLORIDE SERPL-SCNC: 107 MMOL/L (ref 96–108)
CK MB SERPL-MCNC: 2.7 % (ref 0–2.5)
CK MB SERPL-MCNC: 24.2 NG/ML (ref 0–5)
CK SERPL-CCNC: 793 U/L (ref 26–192)
CO2 SERPL-SCNC: 20 MMOL/L (ref 21–32)
CREAT SERPL-MCNC: 7.67 MG/DL (ref 0.6–1.3)
GFR SERPL CREATININE-BSD FRML MDRD: 5 ML/MIN/1.73SQ M
GLUCOSE SERPL-MCNC: 68 MG/DL (ref 65–140)
POTASSIUM SERPL-SCNC: 4.3 MMOL/L (ref 3.5–5.3)
PROT SERPL-MCNC: 5.1 G/DL (ref 6.4–8.4)
SODIUM SERPL-SCNC: 143 MMOL/L (ref 135–147)

## 2022-09-25 RX ORDER — FUROSEMIDE 10 MG/ML
60 INJECTION INTRAMUSCULAR; INTRAVENOUS ONCE
Status: COMPLETED | OUTPATIENT
Start: 2022-09-25 | End: 2022-09-25

## 2022-09-25 RX ORDER — LORAZEPAM 2 MG/ML
1 INJECTION INTRAMUSCULAR ONCE
Status: DISCONTINUED | OUTPATIENT
Start: 2022-09-25 | End: 2022-09-30

## 2022-09-25 RX ORDER — HEPARIN SODIUM 5000 [USP'U]/ML
5000 INJECTION, SOLUTION INTRAVENOUS; SUBCUTANEOUS EVERY 8 HOURS SCHEDULED
Status: DISCONTINUED | OUTPATIENT
Start: 2022-09-25 | End: 2022-11-07 | Stop reason: HOSPADM

## 2022-09-25 RX ORDER — LORAZEPAM 0.5 MG/1
0.5 TABLET ORAL ONCE
Status: COMPLETED | OUTPATIENT
Start: 2022-09-25 | End: 2022-09-25

## 2022-09-25 RX ORDER — ENOXAPARIN SODIUM 100 MG/ML
40 INJECTION SUBCUTANEOUS
Status: DISCONTINUED | OUTPATIENT
Start: 2022-09-25 | End: 2022-09-25

## 2022-09-25 RX ADMIN — HEPARIN SODIUM 5000 UNITS: 5000 INJECTION INTRAVENOUS; SUBCUTANEOUS at 16:07

## 2022-09-25 RX ADMIN — LORAZEPAM 0.5 MG: 0.5 TABLET ORAL at 04:52

## 2022-09-25 RX ADMIN — SODIUM BICARBONATE 125 ML/HR: 84 INJECTION, SOLUTION INTRAVENOUS at 08:27

## 2022-09-25 RX ADMIN — FUROSEMIDE 60 MG: 10 INJECTION, SOLUTION INTRAVENOUS at 13:11

## 2022-09-25 RX ADMIN — Medication 1 PATCH: at 08:28

## 2022-09-25 RX ADMIN — SODIUM BICARBONATE 125 ML/HR: 84 INJECTION, SOLUTION INTRAVENOUS at 17:48

## 2022-09-25 RX ADMIN — HEPARIN SODIUM 5000 UNITS: 5000 INJECTION INTRAVENOUS; SUBCUTANEOUS at 21:24

## 2022-09-25 RX ADMIN — QUETIAPINE FUMARATE 25 MG: 25 TABLET ORAL at 21:18

## 2022-09-25 RX ADMIN — HYDROMORPHONE HYDROCHLORIDE 0.5 MG: 1 INJECTION, SOLUTION INTRAMUSCULAR; INTRAVENOUS; SUBCUTANEOUS at 02:30

## 2022-09-25 NOTE — PHYSICAL THERAPY NOTE
PHYSICAL THERAPY NOTE          Patient Name: Casie Fallon  [id number] Date: 9/25/2022 09/25/22 4220   Note Type   Note Type Treatment   Pain Assessment   Pain Assessment Tool FLACC   Pain Score No Pain  (pt reported no pain upon entering room)   Pain Rating: FLACC (Rest) - Face 0   Pain Rating: FLACC (Rest) - Legs 0   Pain Rating: FLACC (Rest) - Activity 1   Pain Rating: FLACC (Rest) - Cry 0   Pain Rating: FLACC (Rest) - Consolability 0   Score: FLACC (Rest) 1   Pain Rating: FLACC (Activity) - Face 1   Pain Rating: FLACC (Activity) - Legs 1   Pain Rating: FLACC (Activity) - Activity 1   Pain Rating: FLACC (Activity) - Cry 1   Pain Rating: FLACC (Activity) - Consolability 1   Score: FLACC (Activity) 5   Restrictions/Precautions   Other Precautions Cognitive; Bed Alarm; Fall Risk;Pain;Multiple lines   General   Chart Reviewed Yes   Family/Caregiver Present Yes   Cognition   Overall Cognitive Status Impaired   Arousal/Participation Persistent stimuli required   Attention Difficulty attending to directions   Orientation Level Oriented to person;Disoriented to time;Disoriented to place; Disoriented to situation   Memory Decreased recall of precautions;Decreased recall of recent events;Decreased short term memory   Following Commands Follows one step commands inconsistently   Comments inconsistent level of alertness and ability to follow comands  Subjective   Subjective what, what's up? Bed Mobility   Supine to Sit 5  Supervision   Additional items Assist x 1; Increased time required;Verbal cues;HOB elevated  (max cues)   Sit to Supine 2  Maximal assistance   Additional items Assist x 2; Increased time required;Verbal cues;LE management   Transfers   Sit to Stand 4  Minimal assistance   Additional items Assist x 2; Increased time required;Verbal cues   Stand to Sit 4  Minimal assistance   Additional items Assist x 2; Increased time required;Verbal cues   Additional Comments cues for hand placement and safe techniques, cues for tunring and backing up to bed  pt  turned and quickly sat onto bed with quick descent  Ambulation/Elevation   Gait pattern Improper Weight shift; Poor UE support;Decreased foot clearance; Forward Flexion;Decreased R stance; Antalgic; Inconsistent yan; Short stride; Excessively slow; Shuffling   Gait Assistance 3  Moderate assist   Additional items Assist x 2;Verbal cues   Assistive Device Rolling walker   Distance 12' x2   Ambulation/Elevation Additional Comments gait unsteady ataxic, r foot drop, difficulty lifting and advancing r le requiring physical assist to lift and advance r le   Balance   Static Sitting Fair +   Dynamic Sitting Fair   Static Standing Poor +   Dynamic Standing Poor   Ambulatory Poor   Activity Tolerance   Activity Tolerance Patient limited by pain   Exercises   Balance training  pt  performed static sitting activity at eob x 10 minutes with close supervision  Assessment   Prognosis Guarded   Problem List Decreased endurance; Impaired balance;Decreased mobility; Decreased cognition; Impaired judgement;Decreased safety awareness;Decreased coordination;Decreased skin integrity;Pain   Assessment Pt  Seen for PT treatment session as per PT POC  Pt's level of alertness fluctuate during PT session noting decreased ability to follow commands with  with decreased level of alertness  Pt  Is requiring verbal cues and tactile cues  And gesturing for all aspects of mobility  Noted delay in motor and verbal responses, no responses or immediate and clear verbal responses t/o PT session  Maximal stimulation required t/o PT session  Pt is performing supine to sit with supervision,  Sit to supine with max assist x1 sit to stand, stand to sit with min assist x2 and ambulation with mod assist x2 with use of rw with assistance and  Verbal and tactilecues for weight shifting, lifting and advancement of r le    Pt progressed with ambulation distances to 15' x2 with max cues for le sequencing, ue weightbearing, cues and assistance for weightshifting, R foot clearance, cues and assistance for advancement of r le due to r foot drop, assistance for steering and management of rw  Pt  Remains at increased risk for falls due to impairments in balance, safety, cognition, overall strength, R foot drop, poor weight shifting techniques, and gait deviations  Pt is functioning well below baseline level of mobility and remains unable to ambulate household distances or negotiate stairs  STR is recommended at d/c  The patient's AM-PAC Basic Mobility Inpatient Short Form Raw Score is 13  A Raw score of less than or equal to 16 suggests the patient may benefit from discharge to post-acute rehabilitation services  Please also refer to the recommendation of the Physical Therapist for safe discharge planning  Goals   Patient Goals none stated at this time due to impaired cognition   STG Expiration Date 10/04/22   PT Treatment Day 1   Plan   Treatment/Interventions Functional transfer training; Therapeutic exercise; Endurance training;Cognitive reorientation;Patient/family training;Equipment eval/education; Bed mobility;Gait training;Spoke to nursing;OT   Progress Slow progress, cognitive deficits   PT Frequency Other (Comment)  (4-5x/wk)   Recommendation   PT Discharge Recommendation Post acute rehabilitation services   AM-PAC Basic Mobility Inpatient   Turning in Bed Without Bedrails 3   Lying on Back to Sitting on Edge of Flat Bed 3   Moving Bed to Chair 2   Standing Up From Chair 2   Walk in Room 2   Climb 3-5 Stairs 1   Basic Mobility Inpatient Raw Score 13   Basic Mobility Standardized Score 33 99   Highest Level Of Mobility   -[facility] Goal 4: Move to chair/commode   JH-HLM Achieved 6: Walk 10 steps or more   Education   Education Provided Mobility training;Assistive device   Patient Reinforcement needed   End of Consult   Patient Position at End of Consult Supine;Bed/Chair alarm activated; All needs within reach   End of Consult Comments pt in stable comdition at conclsuion of PT session,      09/25/22 9965   Note Type   Note Type Treatment   Pain Assessment   Pain Assessment Tool FLACC   Pain Score No Pain  (pt reported no pain upon entering room)   Pain Rating: FLACC (Rest) - Face 0   Pain Rating: FLACC (Rest) - Legs 0   Pain Rating: FLACC (Rest) - Activity 1   Pain Rating: FLACC (Rest) - Cry 0   Pain Rating: FLACC (Rest) - Consolability 0   Score: FLACC (Rest) 1   Pain Rating: FLACC (Activity) - Face 1   Pain Rating: FLACC (Activity) - Legs 1   Pain Rating: FLACC (Activity) - Activity 1   Pain Rating: FLACC (Activity) - Cry 1   Pain Rating: FLACC (Activity) - Consolability 1   Score: FLACC (Activity) 5   Restrictions/Precautions   Other Precautions Cognitive; Bed Alarm; Fall Risk;Pain;Multiple lines   General   Chart Reviewed Yes   Family/Caregiver Present Yes   Cognition   Overall Cognitive Status Impaired   Arousal/Participation Persistent stimuli required   Attention Difficulty attending to directions   Orientation Level Oriented to person;Disoriented to time;Disoriented to place; Disoriented to situation   Memory Decreased recall of precautions;Decreased recall of recent events;Decreased short term memory   Following Commands Follows one step commands inconsistently   Comments inconsistent level of alertness and ability to follow comands  Subjective   Subjective what, what's up? Bed Mobility   Supine to Sit 5  Supervision   Additional items Assist x 1; Increased time required;Verbal cues;HOB elevated  (max cues)   Sit to Supine 2  Maximal assistance   Additional items Assist x 2; Increased time required;Verbal cues;LE management   Transfers   Sit to Stand 4  Minimal assistance   Additional items Assist x 2; Increased time required;Verbal cues   Stand to Sit 4  Minimal assistance   Additional items Assist x 2; Increased time required;Verbal cues Additional Comments cues for hand placement and safe techniques, cues for tunring and backing up to bed  pt  turned and quickly sat onto bed with quick descent  Ambulation/Elevation   Gait pattern Improper Weight shift; Poor UE support;Decreased foot clearance; Forward Flexion;Decreased R stance; Antalgic; Inconsistent yan; Short stride; Excessively slow; Shuffling   Gait Assistance 3  Moderate assist   Additional items Assist x 2;Verbal cues   Assistive Device Rolling walker   Distance 12' x2   Ambulation/Elevation Additional Comments gait unsteady ataxic, r foot drop, difficulty lifting and advancing r le requiring physical assist to lift and advance r le   Balance   Static Sitting Fair +   Dynamic Sitting Fair   Static Standing Poor +   Dynamic Standing Poor   Ambulatory Poor   Activity Tolerance   Activity Tolerance Patient limited by pain   Exercises   Balance training  pt  performed static sitting activity at eob x 10 minutes with close supervision  Assessment   Prognosis Guarded   Problem List Decreased endurance; Impaired balance;Decreased mobility; Decreased cognition; Impaired judgement;Decreased safety awareness;Decreased coordination;Decreased skin integrity;Pain   Assessment Pt  Seen for PT treatment session as per PT POC  Pt's level of alertness fluctuate during PT session noting decreased ability to follow commands with  with decreased level of alertness  Pt  Is requiring verbal cues and tactile cues  And gesturing for all aspects of mobility  Noted delay in motor and verbal responses, no responses or immediate and clear verbal responses t/o PT session  Maximal stimulation required t/o PT session  Pt is performing supine to sit with supervision,  Sit to supine with max assist x1 sit to stand, stand to sit with min assist x2 and ambulation with mod assist x2 with use of rw with assistance and  Verbal and tactilecues for weight shifting, lifting and advancement of r le    Pt  progressed with ambulation distances to 15' x2 with max cues for le sequencing, ue weightbearing, cues and assistance for weightshifting, R foot clearance, cues and assistance for advancement of r le due to r foot drop, assistance for steering and management of rw  Pt  Remains at increased risk for falls due to impairments in balance, safety, cognition, overall strength, R foot drop, poor weight shifting techniques, and gait deviations  Pt is functioning well below baseline level of mobility and remains unable to ambulate household distances or negotiate stairs  STR is recommended at d/c  The patient's AM-[facility] Basic Mobility Inpatient Short Form Raw Score is 13  A Raw score of less than or equal to 16 suggests the patient may benefit from discharge to post-acute rehabilitation services  Please also refer to the recommendation of the Physical Therapist for safe discharge planning  Goals   Patient Goals none stated at this time due to impaired cognition   STG Expiration Date 10/04/22   PT Treatment Day 1   Plan   Treatment/Interventions Functional transfer training; Therapeutic exercise; Endurance training;Cognitive reorientation;Patient/family training;Equipment eval/education; Bed mobility;Gait training;Spoke to nursing;OT   Progress Slow progress, cognitive deficits   PT Frequency Other (Comment)  (4-5x/wk)   Recommendation   PT Discharge Recommendation Post acute rehabilitation services   AM-[facility] Basic Mobility Inpatient   Turning in Bed Without Bedrails 3   Lying on Back to Sitting on Edge of Flat Bed 3   Moving Bed to Chair 2   Standing Up From Chair 2   Walk in Room 2   Climb 3-5 Stairs 1   Basic Mobility Inpatient Raw Score 13   Basic Mobility Standardized Score 33 99   Highest Level Of Mobility   JH-HL Goal 4: Move to chair/commode   JH-HLM Achieved 6: Walk 10 steps or more   Education   Education Provided Mobility training;Assistive device   Patient Reinforcement needed   End of Consult   Patient Position at End of Consult Supine;Bed/Chair alarm activated; All needs within reach   End of Consult Comments pt in stable comdition at conclsuion of PT session,   Caio Lopes PTA

## 2022-09-25 NOTE — ASSESSMENT & PLAN NOTE
· Secondary to rhabdomyolysis  No evidence of liver injury on CT imaging  · Seen by GI  Hepatic duplex also negative      Results from last 7 days   Lab Units 09/25/22  0551 09/24/22  0548 09/23/22  0530 09/21/22  0550 09/20/22  0606 09/19/22  0829   AST U/L 82* 111* 167* 489* 970* 1,683*   ALT U/L 215* 240* 292* 407* 512* 674*   TOTAL BILIRUBIN mg/dL 0 42 0 41 0 40 0 30 0 35 0 58

## 2022-09-25 NOTE — PROGRESS NOTES
NEPHROLOGY PROGRESS NOTE   Chante Small 45 y o  female MRN: [id number]  Unit/Bed#: [place] [address] -01 Encounter: [id number]  Reason for Consult: ANUJA    ASSESSMENT/PLAN:  1  Acute Kidney Injury, POA- likely due to rhabdomyolysis   - baseline creatinine 0 7-1 0  - creatinine up to 7 67  - UA: large blood, 0-1 rbc, trace protein  - renal imaging: CT- mild nonspecific bilateral perinephric fat stranding, no hydronephrosis or renal calculi  - receiving IVF, changed to bicarbonate drip 9/24/22  - urine output was 800-900ml/day  - abel catheter removed (patient pulled out) and now urine output less accurate in documentation  - does not appear volume overloaded  - no acute indication for dialysis today  - may need dialysis to rule out uremic encephalopathy (however BUN not significantly high)  - dialysis evaluation on Monday  2  Metabolic Acidosis- secondary to ANUJA and large amounts of NSS (chloride load)  - started bicarbonate drip (instead of NSS) on 9/24/22  3  Rhabdomyolysis- CPK level significantly improved to 793 from peak 32,000  4  Hyperkalemia- improved, monitor and treat medically as needed  5  Hyponatremia- resolved  6  Transaminitis- improving slowly  7  Chronic Pain- she takes gabapentin 300mg QID  - recommend max dose of 300mg daily due to renal failure    Disposition:  Evaluate for dialysis on Monday due to ? Uremic encephalopathy  Will need line  Discussed with SLIM attending  SUBJECTIVE:  Patient moaning  Appears slightly more awake today than yesterday  Cannot answer questions  This is not her usual baseline  Noted patient was out to dinner at a nice restaurant recently and also planning for neurosurgery for decompression      OBJECTIVE:  Current Weight: Weight - Scale: 67 9 kg (149 lb 11 1 oz)  Vitals:    09/24/22 1522 09/24/22 2133 09/25/22 0600 09/25/22 0719   BP: 145/95 161/92  146/89   BP [place]  Left arm  Right arm   Pulse: (!) 114 (!) 111  (!) 110   Resp: 17 20  18   Temp: (!) 97 2 °F (36 2 °C) (!) 97 °F (36 1 °C)  97 8 °F (36 6 °C)   TempSrc:  Axillary  Oral   SpO2: 97% 95%  96%   Weight:   67 9 kg (149 lb 11 1 oz)    Height:           Intake/Output Summary (Last 24 hours) at 9/25/2022 1059  Last data filed at 9/25/2022 0827  Gross per 24 hour   Intake --   Output 200 ml   Net -200 ml     General: NAD  Skin: multiple lesions and scabs  Eyes: anicteric  ENMT: mm dry  Neck: no masses  Respiratory: CTAB  Cardiac: RRR  Extremities: trace LE edema  GI: soft nt nd  Neuro: moaning    Medications:    Current Facility-Administered Medications:   •  acetaminophen (TYLENOL) tablet 650 mg, 650 mg, Oral, Q6H PRN, Atul Adan, DO, 650 mg at 09/22/22 1040  •  HYDROmorphone (DILAUDID) injection 0 5 mg, 0 5 mg, Intravenous, Q4H PRN, Atul Adan, DO, 0 5 mg at 09/25/22 0230  •  nicotine (NICODERM CQ) 14 mg/24hr TD 24 hr patch 1 patch, 1 patch, Transdermal, Daily, Atul Adan, DO, 1 patch at 09/25/22 3949  •  ondansetron (ZOFRAN) injection 4 mg, 4 mg, Intravenous, Q4H PRN, Gina Justyna, DO  •  QUEtiapine (SEROquel) tablet 25 mg, 25 mg, Oral, HS, Atul Adan, DO, 25 mg at 09/24/22 2152  •  sodium bicarbonate 75 mEq in sodium chloride 0 45 % 1,000 mL infusion, 125 mL/hr, Intravenous, Continuous, [facility], Last Rate: 125 mL/hr at 09/25/22 0827, 125 mL/hr at 09/25/22 0827  •  venlafaxine (EFFEXOR-XR) 24 hr capsule 150 mg, 150 mg, Oral, Daily, Atul Loco, DO, 150 mg at 09/24/22 0800    Laboratory Results:  Results from last 7 days   Lab Units 09/25/22  0551 09/24/22  0548 09/23/22  0824 09/23/22  0530 09/23/22  0132 09/22/22  1656 09/22/22  0756 09/21/22  0905 09/21/22  0550 09/19/22  1222 09/19/22  0829   WBC Thousand/uL  --  7 44  --  7 64  --   --   --   --  5 97  --  9 38   HEMOGLOBIN g/dL  --  11 1*  --  11 5  --   --   --   --  12 2  --  12 6   HEMATOCRIT %  --  35 4  --  36 2  --   --   --   --  37 7  --  38 4   PLATELETS Thousands/uL  --  297  --  312  --   --   --   --  329  -- 397*   POTASSIUM mmol/L 4 3 4 6 5 4* 4 7 4 9 4 6 4 7   < >  --    < > 5 4*   CHLORIDE mmol/L 107 108 104 104 106 104 102   < >  --    < > 87*   CO2 mmol/L 20* 18* 20* 20* 19* 20* 21   < >  --    < > 24   BUN mg/dL 51* 52* 54* 52* 54* 52* 51*   < >  --    < > 53*   CREATININE mg/dL 7 67* 7 55* 6 98* 7 15* 7 15* 7 10* 6 81*   < >  --    < > 5 88*   CALCIUM mg/dL 8 4 8 2* 8 3 8 1* 8 5 8 5 8 2*   < >  --    < > 7 8*   PHOSPHORUS mg/dL  --   --   --   --   --   --   --   --  7 2*  --   --     < > = values in this interval not displayed  I have personally reviewed the blood work as stated above and in my note  I have personally reviewed slim note

## 2022-09-25 NOTE — PLAN OF CARE
Problem: PHYSICAL THERAPY ADULT  Goal: Performs mobility at highest level of function for planned discharge setting  See evaluation for individualized goals  Description: Treatment/Interventions: Functional transfer training, LE strengthening/ROM, Elevations, Therapeutic exercise, Cognitive reorientation, Patient/family training, Equipment eval/education, Bed mobility, Gait training, Compensatory technique education, Continued evaluation, Spoke to nursing, Spoke to case management, Spoke to MD, OT, ST  Equipment Recommended: [employer] (if patient does not own)       See flowsheet documentation for full assessment, interventions and recommendations  Outcome: Progressing  Note: Prognosis: Guarded  Problem List: Decreased endurance, Impaired balance, Decreased mobility, Decreased cognition, Impaired judgement, Decreased safety awareness, Decreased coordination, Decreased skin integrity, Pain  Assessment: Pt  Seen for PT treatment session as per PT POC  Pt's level of alertness fluctuate during PT session noting decreased ability to follow commands with  with decreased level of alertness  Pt  Is requiring verbal cues and tactile cues  And gesturing for all aspects of mobility  Noted delay in motor and verbal responses, no responses or immediate and clear verbal responses t/o PT session  Maximal stimulation required t/o PT session  Pt is performing supine to sit with supervision,  Sit to supine with max assist x1 sit to stand, stand to sit with min assist x2 and ambulation with mod assist x2 with use of rw with assistance and  Verbal and tactilecues for weight shifting, lifting and advancement of r le  Pt  progressed with ambulation distances to 12' x2 with max cues for le sequencing, ue weightbearing, cues and assistance for weightshifting, R foot clearance, cues and assistance for advancement of r le due to r foot drop, assistance for steering and management of rw    Pt  Remains at increased risk for falls due to impairments in balance, safety, cognition, overall strength, R foot drop, poor weight shifting techniques, and gait deviations  Pt is functioning well below baseline level of mobility and remains unable to ambulate household distances or negotiate stairs  STR is recommended at d/c  The patient's AM-PAC Basic Mobility Inpatient Short Form Raw Score is 13  A Raw score of less than or equal to 16 suggests the patient may benefit from discharge to post-acute rehabilitation services  Please also refer to the recommendation of the Physical Therapist for safe discharge planning  Barriers to Discharge Comments: unable to identify at this time  PT Discharge Recommendation: Post acute rehabilitation services    See flowsheet documentation for full assessment

## 2022-09-25 NOTE — PLAN OF CARE
Problem: OCCUPATIONAL THERAPY ADULT  Goal: Performs self-care activities at highest level of function for planned discharge setting  See evaluation for individualized goals  Description: Treatment Interventions: ADL retraining, Functional transfer training, Patient/family training, Equipment evaluation/education, Cognitive reorientation, Endurance training, UE strengthening/ROM, Compensatory technique education, Energy conservation, Activityengagement          See flowsheet documentation for full assessment, interventions and recommendations  Note: Limitation: Decreased ADL status, Decreased Safe judgement during ADL, Decreased cognition, Decreased endurance, Decreased high-level ADLs     Assessment: Pt seen for skilled Occupational Therapy session focused on ADL's, balance, endurance, functional transfers and mobility  Pt recieved supine in bed agreeable to therapy with increased time and encouragement  Pt needed multi modal cueing throughout the session to remain engaged, pt noted with a consistent right head gaze, able to move head left with cues  Pt continues to need assist from 2 people to mobilize patient, pt requires max assist for LB dressing  Pt to continue to benefit from continued acute OT services during hospital stay to address defined deficits and to maximize level of functional independence in the following Occupational Performance areas: grooming, bathing/shower, toilet hygiene, dressing, medication management, health maintenance, functional mobility, community mobility, clothing management and social participation  The patient's raw score on the AM-PAC Daily Activity inpatient short form low function score is 14, standardized score is Low Function Daily Activity Standardized Score: (P) 27 65  Patients with a standardized score less than 39 4 are likely to benefit from discharge to post-acute rehab services   Please refer to the recommendation of the Occupational Therapist for safe discharge planning       OT Discharge Recommendation: Post acute rehabilitation services

## 2022-09-25 NOTE — PLAN OF CARE
Problem: PAIN - ADULT  Goal: Verbalizes/displays adequate comfort level or baseline comfort level  Description: Interventions:  - Encourage patient to monitor pain and request assistance  - Assess pain using appropriate pain scale  - Administer analgesics based on type and severity of pain and evaluate response  - Implement non-pharmacological measures as appropriate and evaluate response  - Consider cultural and social influences on pain and pain management  - Notify physician/advanced practitioner if interventions unsuccessful or patient reports new pain  Outcome: Progressing     Problem: INFECTION - ADULT  Goal: Absence or prevention of progression during hospitalization  Description: INTERVENTIONS:  - Assess and monitor for signs and symptoms of infection  - Monitor lab/diagnostic results  - Monitor all insertion sites, i e  indwelling lines, tubes, and drains  - Monitor endotracheal if appropriate and nasal secretions for changes in amount and color  - Kuna appropriate cooling/warming therapies per order  - Administer medications as ordered  - Instruct and encourage patient and family to use good hand hygiene technique  - Identify and instruct in appropriate isolation precautions for identified infection/condition  Outcome: Progressing  Goal: Absence of fever/infection during neutropenic period  Description: INTERVENTIONS:  - Monitor WBC    Outcome: Progressing     Problem: SAFETY ADULT  Goal: Patient will remain free of falls  Description: INTERVENTIONS:  - Educate patient/family on patient safety including physical limitations  - Instruct patient to call for assistance with activity   - Consult OT/PT to assist with strengthening/mobility   - Keep Call bell within reach  - Keep bed low and locked with side rails adjusted as appropriate  - Keep care items and personal belongings within reach  - Initiate and maintain comfort rounds  - Make Fall Risk Sign visible to staff  - Offer Toileting every 2 Hours, in advance of need  - Initiate/Maintain bed alarm  - Obtain necessary fall risk management equipment:   - Apply yellow socks and bracelet for high fall risk patients  - Consider moving patient to room near nurses station  Outcome: Progressing  Goal: Maintain or return to baseline ADL function  Description: INTERVENTIONS:  -  Assess patient's ability to carry out ADLs; assess patient's baseline for ADL function and identify physical deficits which impact ability to perform ADLs (bathing, care of mouth/teeth, toileting, grooming, dressing, etc )  - Assess/evaluate cause of self-care deficits   - Assess range of motion  - Assess patient's mobility; develop plan if impaired  - Assess patient's need for assistive devices and provide as appropriate  - Encourage maximum independence but intervene and supervise when necessary  - Involve family in performance of ADLs  - Assess for home care needs following discharge   - Consider OT consult to assist with ADL evaluation and planning for discharge  - Provide patient education as appropriate  Outcome: Progressing  Goal: Maintains/Returns to pre admission functional level  Description: INTERVENTIONS:  - Perform BMAT or MOVE assessment daily    - Set and communicate daily mobility goal to care team and patient/family/caregiver  - Collaborate with rehabilitation services on mobility goals if consulted  - Perform Range of Motion 4 times a day  - Reposition patient every 2 hours    - Dangle patient 3 times a day  - Stand patient 3 times a day  - Ambulate patient 3 times a day  - Out of bed to chair 3 times a day   - Out of bed for meals 3 times a day  - Out of bed for toileting  - Record patient progress and toleration of activity level   Outcome: Progressing     Problem: DISCHARGE PLANNING  Goal: Discharge to home or other facility with appropriate resources  Description: INTERVENTIONS:  - Identify barriers to discharge w/patient and caregiver  - Arrange for needed discharge resources and transportation as appropriate  - Identify discharge learning needs (meds, wound care, etc )  - Arrange for interpretive services to assist at discharge as needed  - Refer to Case Management Department for coordinating discharge planning if the patient needs post-hospital services based on physician/advanced practitioner order or complex needs related to functional status, cognitive ability, or social support system  Outcome: Progressing     Problem: Knowledge Deficit  Goal: Patient/family/caregiver demonstrates understanding of disease process, treatment plan, medications, and discharge instructions  Description: Complete learning assessment and assess knowledge base    Interventions:  - Provide teaching at level of understanding  - Provide teaching via preferred learning methods  Outcome: Progressing     Problem: Potential for Falls  Goal: Patient will remain free of falls  Description: INTERVENTIONS:  - Educate patient/family on patient safety including physical limitations  - Instruct patient to call for assistance with activity   - Consult OT/PT to assist with strengthening/mobility   - Keep Call bell within reach  - Keep bed low and locked with side rails adjusted as appropriate  - Keep care items and personal belongings within reach  - Initiate and maintain comfort rounds  - Make Fall Risk Sign visible to staff  - Offer Toileting every 2 Hours, in advance of need  - Initiate/Maintain bed alarm  - Obtain necessary fall risk management equipment:   - Apply yellow socks and bracelet for high fall risk patients  - Consider moving patient to room near nurses station  Outcome: Progressing     Problem: MOBILITY - ADULT  Goal: Maintain or return to baseline ADL function  Description: INTERVENTIONS:  -  Assess patient's ability to carry out ADLs; assess patient's baseline for ADL function and identify physical deficits which impact ability to perform ADLs (bathing, care of mouth/teeth, toileting, grooming, dressing, etc )  - Assess/evaluate cause of self-care deficits   - Assess range of motion  - Assess patient's mobility; develop plan if impaired  - Assess patient's need for assistive devices and provide as appropriate  - Encourage maximum independence but intervene and supervise when necessary  - Involve family in performance of ADLs  - Assess for home care needs following discharge   - Consider OT consult to assist with ADL evaluation and planning for discharge  - Provide patient education as appropriate  Outcome: Progressing  Goal: Maintains/Returns to pre admission functional level  Description: INTERVENTIONS:  - Perform BMAT or MOVE assessment daily    - Set and communicate daily mobility goal to care team and patient/family/caregiver  - Collaborate with rehabilitation services on mobility goals if consulted  - Perform Range of Motion 4 times a day  - Reposition patient every 2 hours    - Dangle patient 3 times a day  - Stand patient 3 times a day  - Ambulate patient 3 times a day  - Out of bed to chair 3 times a day   - Out of bed for meals 3 times a day  - Out of bed for toileting  - Record patient progress and toleration of activity level   Outcome: Progressing     Problem: Prexisting or High Potential for Compromised Skin Integrity  Goal: Skin integrity is maintained or improved  Description: INTERVENTIONS:  - Identify patients at risk for skin breakdown  - Assess and monitor skin integrity  - Assess and monitor nutrition and hydration status  - Monitor labs   - Assess for incontinence   - Turn and reposition patient  - Assist with mobility/ambulation  - Relieve pressure over bony prominences  - Avoid friction and shearing  - Provide appropriate hygiene as needed including keeping skin clean and dry  - Evaluate need for skin moisturizer/barrier cream  - Collaborate with interdisciplinary team   - Patient/family teaching  - Consider wound care consult   Outcome: Progressing     Problem: Nutrition/Hydration-ADULT  Goal: Nutrient/Hydration intake appropriate for improving, restoring or maintaining nutritional needs  Description: Monitor and assess patient's nutrition/hydration status for malnutrition  Collaborate with interdisciplinary team and initiate plan and interventions as ordered  Monitor patient's weight and dietary intake as ordered or per policy  Utilize nutrition screening tool and intervene as necessary  Determine patient's food preferences and provide high-protein, high-caloric foods as appropriate       INTERVENTIONS:  - Monitor oral intake, urinary output, labs, and treatment plans  - Assess nutrition and hydration status and recommend course of action  - Evaluate amount of meals eaten  - Assist patient with eating if necessary   - Allow adequate time for meals  - Recommend/ encourage appropriate diets, oral nutritional supplements, and vitamin/mineral supplements  - Order, calculate, and assess calorie counts as needed  - Recommend, monitor, and adjust tube feedings and TPN/PPN based on assessed needs  - Assess need for intravenous fluids  - Provide specific nutrition/hydration education as appropriate  - Include patient/family/caregiver in decisions related to nutrition  Outcome: Progressing

## 2022-09-25 NOTE — PROGRESS NOTES
[facility]  Progress Note - Yanet Mascorro 1970, 45 y o  female MRN: [id number]  Unit/Bed#: [place] [address] -01 Encounter: [id number]  Primary Care Provider: Mateus Mo MD   Date and time admitted to hospital: 9/19/2022  8:04 AM    * Encephalopathy acute  Assessment & Plan  · Acute encephalopathy secondary to gabapentin and morphine with subsequent renal failure  · Also had hypoglycemia requiring D50  No further hypoglycemia  · Holding both gabapentin and morphine since admission  · Neurology consulted for per persistent encephalopathy  · Patient declined EEG   · LP and MRI negative for acute pathology    ARF (acute renal failure) ([facility] [address] )  Assessment & Plan  · ANUJA/ATN secondary to rhabdomyolysis  No evidence of renal obstruction on imaging  · Appreciate nephrology evaluation  Awaiting renal recovery  Continue IV fluids with sodium bicarbonate  · No emergent need for hemodialysis a patient has been consented  · IV furosemide x1 ordered today    Results from last 7 days   Lab Units 09/25/22  0551 09/24/22  0548 09/23/22  0824 09/23/22  0530 09/23/22  0132 09/22/22  1656 09/22/22  0756 09/21/22  1415 09/21/22  0905   BUN mg/dL 51* 52* 54* 52* 54* 52* 51* 54* 52*   CREATININE mg/dL 7 67* 7 55* 6 98* 7 15* 7 15* 7 10* 6 81* 6 74* 6 72*   EGFR ml/min/1 73sq m 5 5 6 5 5 5 6 6 6       Transaminitis  Assessment & Plan  · Secondary to rhabdomyolysis  No evidence of liver injury on CT imaging  · Seen by GI  Hepatic duplex also negative  Results from last 7 days   Lab Units 09/25/22  0551 09/24/22  0548 09/23/22  0530 09/21/22  0550 09/20/22  0606 09/19/22  0829   AST U/L 82* 111* 167* 489* 970* 1,683*   ALT U/L 215* 240* 292* 407* 512* 674*   TOTAL BILIRUBIN mg/dL 0 42 0 41 0 40 0 30 0 35 0 58       Leg edema, right  Assessment & Plan  · Likely source of rhabdomyolysis    No evidence of compartment syndrome fracture or DVT on imaging  · 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  · Orthopedic consulted  Repeat duplex negative for DVT  MRI ordered  D-dimer, elevated  Assessment & Plan  · Elevated D-dimer may be related to fall  · Lower extremity duplex negative for DVT  · Renal dysfunction cannot check CTA of chest but doubt PE  · Discontinued heparin infusion and transitioned to prophylaxis dose    Traumatic rhabdomyolysis [facility])  Assessment & Plan  · Secondary to fall last Friday  IV fluids per nephrology  Results from last 7 days   Lab Units 09/25/22  0551 09/24/22  0548 09/23/22  0530 09/22/22  0756 09/21/22  0550 09/20/22  0606 09/19/22  0829   CK TOTAL U/L 793* 905* 1,482* 2,468* 5,033* 14,147* 87,885*       Abnormal CT of the chest  Assessment & Plan  · Left-sided opacities noted  COVID negative  · Procalcitonin only minimally elevated without evidence of active infection    Results from last 7 days   Lab Units 09/20/22  0606   PROCALCITONIN ng/ml 0 31*       GERD (gastroesophageal reflux disease)  Assessment & Plan  · Holding PPI given renal injury    Tobacco abuse  Assessment & Plan  · Nicotine patch ordered    Depression  Assessment & Plan  · Continue venlafaxine and quetiapine  · Does have history of suicide attempt a currently does not show any signs of thoughts of self-harm      VTE Pharmacologic Prophylaxis:  Moderate Risk (Score 3-4) - Pharmacological DVT Prophylaxis Ordered: Start heparin for VT prophylaxis  Patient Centered Rounds: I have performed bedside rounds with nursing staff today  Discussions with Specialists or Other Care Team Provider:  Nephrology and orthopedics    Education and Discussions with Family / Patient: Updated  () at bedside  Time Spent for Care: 25 mins  More than 50% of total time spent on counseling and coordination of care as described above      Current Length of Stay: 6 day(s)  Current Patient Status: Inpatient   Certification Statement: The patient will continue to require additional inpatient hospital stay due to encephalopathy renal failure and leg pains  Discharge Plan / Estimated Discharge Date: Anticipate discharge in >72 hrs to rehab facility  Code Status: Level 1 - Full Code      Subjective:   Patient seen and examined  Still confused and agitated but awake and responsive  Objective:   Vitals: Blood pressure 146/89, pulse (!) 110, temperature 97 8 °F (36 6 °C), temperature source Oral, resp  rate 18, height 5' 6" (1 676 m), weight 67 9 kg (149 lb 11 1 oz), last menstrual period 03/14/2019, SpO2 96 %  Intake/Output Summary (Last 24 hours) at 9/25/2022 1503  Last data filed at 9/25/2022 0827  Gross per 24 hour   Intake --   Output 200 ml   Net -200 ml       Physical Exam  Vitals reviewed  Constitutional:       General: She is not in acute distress  HENT:      Head: Atraumatic  Eyes:      Extraocular Movements: Extraocular movements intact  Cardiovascular:      Rate and Rhythm: Regular rhythm  Pulmonary:      Breath sounds: Decreased breath sounds present  No wheezing  Abdominal:      General: Bowel sounds are normal       Palpations: Abdomen is soft  Tenderness: There is no guarding or rebound  Musculoskeletal:         General: Swelling and tenderness present  Comments: Right leg   Skin:     General: Skin is warm  Comments: Abrasion right knee and left chin   Neurological:      Mental Status: She is alert  She is disoriented  Motor: Weakness (Right leg) present  Psychiatric:         Cognition and Memory: Cognition is impaired         Additional Data:   Labs:  Results from last 7 days   Lab Units 09/24/22  0548 09/23/22  0530 09/21/22  0550 09/19/22  1047   WBC Thousand/uL 7 44 7 64 5 97  --    HEMOGLOBIN g/dL 11 1* 11 5 12 2  --    PLATELETS Thousands/uL 297 312 329  --    MCV fL 105* 105* 102*  --    INR   --  1 03  --  1 11     Results from last 7 days   Lab Units 09/25/22  0551 09/24/22  0548 09/23/22  0824 09/23/22  0530   SODIUM mmol/L 143 140 137 138 POTASSIUM mmol/L 4 3 4 6 5 4* 4 7   CHLORIDE mmol/L 107 108 104 104   CO2 mmol/L 20* 18* 20* 20*   ANION GAP mmol/L 16* 14* 13 14*   BUN mg/dL 51* 52* 54* 52*   CREATININE mg/dL 7 67* 7 55* 6 98* 7 15*   CALCIUM mg/dL 8 4 8 2* 8 3 8 1*   ALBUMIN g/dL 1 8* 1 7*  --  1 7*   TOTAL BILIRUBIN mg/dL 0 42 0 41  --  0 40   ALK PHOS U/L 67 67  --  69   ALT U/L 215* 240*  --  292*   AST U/L 82* 111*  --  167*   EGFR ml/min/1 73sq m 5 5 6 5   GLUCOSE RANDOM mg/dL 68 64* 68 69     Results from last 7 days   Lab Units 09/21/22  0550   PHOSPHORUS mg/dL 7 2*     Results from last 7 days   Lab Units 09/25/22  0551 09/24/22  0548 09/23/22  0530   CK TOTAL U/L 793* 905* 1,482*   CK MB INDEX % 2 7* 2 8* 2 1     Results from last 7 days   Lab Units 09/19/22  1220 09/19/22  1047 09/19/22  0829   HS TNI 0HR ng/L  --   --  80*   HS TNI 2HR ng/L  --  73*  --    HS TNI 4HR ng/L 84*  --   --           Results from last 7 days   Lab Units 09/20/22  0606 09/19/22  1222   LACTIC ACID mmol/L  --  0 4*   PROCALCITONIN ng/ml 0 31*  --      Results from last 7 days   Lab Units 09/20/22  1154   POC GLUCOSE mg/dl 90         Results from last 7 days   Lab Units 09/19/22  0829   TSH 3RD GENERATON uIU/mL 3 047     * I Have Reviewed All Lab Data Listed Above      Cultures:   Results from last 7 days   Lab Units 09/23/22  1608 09/19/22  1401   GRAM STAIN RESULT  No Polys or Bacteria seen  --    INFLUENZA A PCR   --  Negative       Results from last 7 days   Lab Units 09/19/22  1401   SARS-COV-2  Negative   INFLUENZA A PCR  Negative   INFLUENZA B PCR  Negative   RSV PCR  Negative           Lines/Drains:  Invasive Devices  Report    Peripheral Intravenous Line  Duration           Peripheral IV 09/24/22 Right Antecubital 1 day              Telemetry:      Imaging:  Imaging Reports Reviewed Today Include:   CT chest abdomen pelvis wo contrast    Result Date: 9/19/2022  Impression: Bilateral groundglass opacities with superimposed inter and intralobular septal thickening  Differential diagnosis includes pulmonary hemorrhage, infection, and pulmonary edema, although the distribution is atypical for edema  The study was marked in [facility] for immediate notification  Workstation performed: [id number]     XR hip/pelv 2-3 vws right    Result Date: 9/19/2022  Impression: No acute osseous abnormality  Workstation performed: [id number]     XR ankle 3+ views RIGHT    Result Date: 9/19/2022  Impression: No acute osseous abnormality  Workstation performed: [id number]     XR foot 3+ views RIGHT    Result Date: 9/19/2022  Impression: No acute osseous abnormality  Workstation performed: [id number]     CT head without contrast    Result Date: 9/19/2022  Impression: No acute intracranial process  No skull fracture  Workstation performed: [id number]     CT spine cervical without contrast    Result Date: 9/19/2022  Impression: No cervical spine fracture or traumatic malalignment  Incidental finding of partially visualized subpleural groundglass opacity in the left pulmonary apex presumably scarring  Cannot exclude infiltrate  This study demonstrates a significant  finding and was documented as such in [facility] for liaison and referring practitioner notification  Workstation performed: [id number]     MRI brain wo contrast    Result Date: 9/23/2022  Impression: No evidence of recent infarct  No mass effect or midline shift  Study performed in the limited fashion with extensive motion artifact  Workstation performed: [id number]     XR chest 1 view    Result Date: 9/19/2022  Impression: No acute cardiopulmonary disease  Findings are stable Workstation performed: [id number]     US right upper quadrant with liver dopplers    Result Date: 9/20/2022  Impression: 1  Minimal layering sludge without evidence of acute cholecystitis  2   Normal-appearing liver with normal liver Dopplers   Workstation performed: [id number]     CT lower extremity wo contrast right    Result Date: 9/19/2022  Impression: There is no evidence of intramuscular hematoma or other mass lesion in the right calf  Please note that rhabdomyolysis and compartment syndrome are clinical diagnoses, and are not excluded based on these imaging findings  Workstation performed: [id number]       Scheduled Meds:  Current Facility-Administered Medications   Medication Dose Route Frequency Provider Last Rate   • acetaminophen  650 mg Oral Q6H PRN Lum Bread, DO     • HYDROmorphone  0 5 mg Intravenous Q4H PRN Lum Bread, DO     • LORazepam  1 mg Intravenous Once Lum Bread, DO     • nicotine  1 patch Transdermal Daily Atul Adan DO     • ondansetron  4 mg Intravenous Q4H PRN Lum Bread, DO     • QUEtiapine  25 mg Oral HS Atul Adan DO     • sodium bicarbonate infusion  125 mL/hr Intravenous Continuous [facility] 125 mL/hr (09/25/22 0827)   • venlafaxine  150 mg Oral Daily Lum Bread, DO         Today, Patient Was Seen By: Grady Sigala, DO    ** Please Note: Dictation voice to text software may have been used in the creation of this document   **

## 2022-09-25 NOTE — ASSESSMENT & PLAN NOTE
· Left-sided opacities noted  COVID negative    · Procalcitonin only minimally elevated without evidence of active infection    Results from last 7 days   Lab Units 09/20/22  0606   PROCALCITONIN ng/ml 0 31*

## 2022-09-25 NOTE — ASSESSMENT & PLAN NOTE
· ANUJA/ATN secondary to rhabdomyolysis  No evidence of renal obstruction on imaging  · Appreciate nephrology evaluation  Awaiting renal recovery  Continue IV fluids with sodium bicarbonate    · No emergent need for hemodialysis a patient has been consented  · IV furosemide x1 ordered today    Results from last 7 days   Lab Units 09/25/22  0551 09/24/22  0548 09/23/22  0824 09/23/22  0530 09/23/22  0132 09/22/22  1656 09/22/22  0756 09/21/22  1415 09/21/22  0905   BUN mg/dL 51* 52* 54* 52* 54* 52* 51* 54* 52*   CREATININE mg/dL 7 67* 7 55* 6 98* 7 15* 7 15* 7 10* 6 81* 6 74* 6 72*   EGFR ml/min/1 73sq m 5 5 6 5 5 5 6 6 6

## 2022-09-25 NOTE — CONSULTS
Consultation - Orthopedics   Demetrius Grace 45 y o  female MRN: [id number]  Unit/Bed#: [place] 2 -01 Encounter: [id number]      Assessment/Plan     Assessment:  44 yo female with diffuse right lower extremity swelling of unknown etiology, differential includes proximal DVT vs myositis    Plan:  · No concern for compartment syndrome on exam as all leg compartments are soft and compressible  There is a possibility that a proximal DVT would have been missed on previous venous duplex study and this could explain diffuse lower extremity swelling  Could also consider infectious myositis however patient is afebrile and has no white count  · Order placed for repeat venous duplex to include more proximal vasculature including femoral and iliac  · If no DVT, we will order MRI of the lower leg to evaluate for subcutaneous edema or fluid collection  Discussed with slim that patient will require sedation to tolerate imaging study  · Continue medical management per edi, Neurology, and Nephrology  · Continue with elevation of the right lower extremity  · Pain control p r n  Francella Crooked · Ortho will follow      History of Present Illness   Physician Requesting Consult: Lizzeth Singleton DO  Reason for Consult / Principal Problem:  Right leg swelling  HPI: Demetrius Grace is a 45y o  year old female who presents on 09/19/2022 with altered mental status  Patient was admitted to the hospital for workup of altered mental status and various metabolic abnormalities  Over the past week patient has been followed by neurology and nephrology as well and is being treated for ANUJA, rhabdomyolysis, metabolic acidosis, and right leg edema  Orthopedics was consulted due to persistent diffuse right leg swelling  On exam today, patient remains to be confused and is unable to provide history  She does respond to her name but does not respond to any yes or no questions    She appears to be uncomfortable and repetitively rubs the lateral aspect of the right thigh  Per chart review, patient does have history of lumbar spine diffuse degeneration and various levels of disc bulge and stenosis which was being managed by neuro surgery  Patient was planning for surgery in the upcoming weeks  According to those office visit notes it appears the patient was reporting several years of right-sided low back and lower extremity pain, numbness, and tingling  Patient was on morphine for pain at home  Consults    ROS: see HPI, all other systems negative  Historical Information   Past Medical History:   Diagnosis Date   • Chronic pain disorder    • Depression    • GERD (gastroesophageal reflux disease)    • History of electroconvulsive therapy    • Low back pain    • Self-injurious behavior    • Suicide attempt [facility])      Past Surgical History:   Procedure Laterality Date   •  SECTION     • COLONOSCOPY     • PANCREAS SURGERY      "pseudocysts" per patient's  Ricki Infante   • [place] ESOPHAGOGASTRODUODENOSCOPY TRANSORAL DIAGNOSTIC N/A 4/10/2018    Procedure: EGD AND COLONOSCOPY;  Surgeon: Karrie Madden MD;  [place] [facility];   Service: Gastroenterology     Social History   Social History     Substance and Sexual Activity   Alcohol Use Yes    Comment: "occasional glass of wine"     Social History     Substance and Sexual Activity   Drug Use Not Currently   • Types: Marijuana, Cocaine    Comment: medical     Social History     Tobacco Use   Smoking Status Current Every Day Smoker   • Packs/day: 0 50   • Years: 35 00   • Pack years: 17 50   • Types: Cigarettes   Smokeless Tobacco Never Used     Family History:   Family History   Problem Relation Age of Onset   • Arthritis Mother    • Coronary artery disease Mother         MI in her 63's   • Parkinsonism Father         with falls and SDH   • Parkinsonism Paternal Grandmother    • Coronary artery disease Paternal Grandfather    • Parkinsonism Paternal Aunt    • Colon cancer Family    • Depression Neg Hx Meds/Allergies   Medications Prior to Admission   Medication   • gabapentin (NEURONTIN) 300 mg capsule   • methocarbamol (ROBAXIN) 500 mg tablet   • omeprazole (PriLOSEC) 40 MG capsule   • QUEtiapine (SEROquel) 50 mg tablet   • venlafaxine (EFFEXOR-XR) 150 mg 24 hr capsule   • venlafaxine (EFFEXOR-XR) 37 5 mg 24 hr capsule   • morphine (MSIR) 15 mg tablet   • nicotine (NICODERM CQ) 21 mg/24 hr TD 24 hr patch     Allergies   Allergen Reactions   • Chantix [Varenicline]    • Ibuprofen Other (See Comments)     Upset stomach   • Lyrica [Pregabalin] Other (See Comments)     bruising   • Penicillins Other (See Comments)     ? hives   • Sulfa Antibiotics Other (See Comments)     sloughing skin in mouth   • Sulfasalazine        Objective   Vitals: Blood pressure 146/89, pulse (!) 110, temperature 97 8 °F (36 6 °C), temperature source Oral, resp  rate 18, height 5' 6" (1 676 m), weight 67 9 kg (149 lb 11 1 oz), last menstrual period 03/14/2019, SpO2 96 %  ,Body mass index is 24 16 kg/m²  Intake/Output Summary (Last 24 hours) at 9/25/2022 1211  Last data filed at 9/25/2022 0827  Gross per 24 hour   Intake --   Output 200 ml   Net -200 ml     I/O last 24 hours: In: -   Out: 200 [Urine:200]    Invasive Devices  Report    Peripheral Intravenous Line  Duration           Peripheral IV 09/24/22 Right Antecubital <1 day                PE:  Gen:  Awake and alert, appears to be uncomfortable and constantly moving in bed, adjusting the position of her legs and massaging the lateral aspect of the right thigh  HEENT:  Hearing intact  Heart:  Regular rate  Lungs: no audible wheezing  GI: no abdominal distension    Ortho Exam   Right lower extremity:  Inspection- diffuse swelling from the hip to the foot, no erythema or ecchymosis  Palpation- +diffuse swelling from the hip to the foot  All compartments are compressible  No reproducible TTP  Patient appears to wince with palpation of the upper lateral thigh and calf     ROM- spontaneous active motion of the hip the knee is noted  No spontaneous active motion of the ankle or toes is noted  Patient is unable to follow commands  No pain with log roll of the hip  Neurovascular- patient is unable to follow commands to determine sensation  Palpable posterior tibial pulse  Unable to determined if AT/GS/EHL intact though does not appear so as no spontaneous motion is noted      Left lower extremity:  Inspection- no swelling, erythema, or ecchymosis  Palpation- no swelling and no TTP    Lab Results:   CBC: No results found for: WBC, HGB, HCT, MCV, PLT, ADJUSTEDWBC, MCH, MCHC, RDW, MPV, NRBC  CMP:   Lab Results   Component Value Date    SODIUM 143 09/25/2022     09/25/2022    CO2 20 (L) 09/25/2022    BUN 51 (H) 09/25/2022    CREATININE 7 67 (H) 09/25/2022    CALCIUM 8 4 09/25/2022    AST 82 (H) 09/25/2022     (H) 09/25/2022    ALKPHOS 67 09/25/2022    EGFR 5 09/25/2022     Imaging Studies: I have personally reviewed pertinent films in PACS   X-ray right ankle-  no acute osseous deformity  X-ray right foot- no acute osseous deformity  X-ray right hip- no acute osseous deformity, degenerative changes in the visualized lumbar spine      Code Status: Level 1 - Full Code  Advance Directive and Living Will:      Power of :    POLST:

## 2022-09-25 NOTE — OCCUPATIONAL THERAPY NOTE
Occupational Therapy Progress Note     Patient Name: Nelson Suárez  Today's Date: 9/25/2022  Problem List  Principal Problem:    Encephalopathy acute  Active Problems:    Depression    Tobacco abuse    DDD (degenerative disc disease), lumbosacral    GERD (gastroesophageal reflux disease)    ARF (acute renal failure) (HCC)    Transaminitis    Abnormal CT of the chest    Traumatic rhabdomyolysis [facility])    D-dimer, elevated    Leg edema, right            09/25/22 0846   OT Last Visit   OT Visit Date 09/25/22   Note Type   Note Type Treatment   Restrictions/Precautions   Weight Bearing Precautions Per Order No   Other Precautions Cognitive; Bed Alarm; Chair Alarm;Multiple lines; Fall Risk;Pain   General   Family/Caregiver Present  came in the middle of treatment then left,  with patient at end of session   Pain Assessment   Pain Score No Pain   ADL   Grooming Assistance 2  Maximal Assistance   Grooming Deficit Verbal cueing; Increased time to complete   LB Dressing Assistance 2  Maximal Assistance   LB Dressing Deficit Increased time to complete;Verbal cueing   Functional Standing Tolerance   Comments sat EOB for approx 10 mins with supervision   Bed Mobility   Supine to Sit 5  Supervision   Additional items Assist x 1;HOB elevated; Increased time required;Verbal cues  (multimodal cueing)   Sit to Supine 2  Maximal assistance   Additional items Assist x 2; Increased time required;Verbal cues   Transfers   Sit to Stand 4  Minimal assistance   Additional items Assist x 2; Increased time required;Verbal cues   Stand to Sit 4  Minimal assistance   Additional items Assist x 2; Increased time required;Verbal cues   Toilet transfer 3  Moderate assistance   Additional items Assist x 2; Increased time required;Verbal cues   Additional Comments RW needed cues for sequencing with mobility and assist advancing LE's   Cognition   Overall Cognitive Status Impaired   Arousal/Participation Persistent stimuli required  (inconsistent alertness and ability to follow commands)   Attention Difficulty attending to directions   Orientation Level Oriented to person   Memory Decreased recall of recent events;Decreased short term memory;Decreased recall of precautions   Following Commands Follows one step commands inconsistently   Activity Tolerance   Activity Tolerance Patient limited by fatigue   Medical Staff Made Aware PTA and RN   Assessment   Assessment Pt seen for skilled Occupational Therapy session focused on ADL's, balance, endurance, functional transfers and mobility  Pt recieved supine in bed agreeable to therapy with increased time and encouragement  Pt needed multi modal cueing throughout the session to remain engaged, pt noted with a consistent right head gaze, able to move head left with cues  Pt continues to need assist from 2 people to mobilize patient, pt requires max assist for LB dressing  Pt to continue to benefit from continued acute OT services during hospital stay to address defined deficits and to maximize level of functional independence in the following Occupational Performance areas: grooming, bathing/shower, toilet hygiene, dressing, medication management, health maintenance, functional mobility, community mobility, clothing management and social participation  The patient's raw score on the -PAC Daily Activity inpatient short form low function score is 14, standardized score is Low Function Daily Activity Standardized Score: (P) 27 65  Patients with a standardized score less than 39 4 are likely to benefit from discharge to post-acute rehab services  Please refer to the recommendation of the Occupational Therapist for safe discharge planning  Plan   Treatment Interventions ADL retraining;Functional transfer training;Patient/family training;Equipment evaluation/education;Cognitive reorientation; Endurance training;UE strengthening/ROM; Compensatory technique education; Energy conservation; Activityengagement   Goal Expiration Date 10/04/22   OT Treatment Day 1   OT Frequency 3-5x/wk   Recommendation   OT Discharge Recommendation Post acute rehabilitation services   AM-PAC Daily Activity Inpatient   Lower Body Dressing 2   Bathing 1   Toileting 2   Upper Body Dressing 2   Grooming 2   Eating 2   Daily Activity Raw Score 11   Daily Activity Standardized Score (Calc for Raw Score >=11) 29 04   Turning Head Towards Sound 3   Follow Simple Instructions 2   Low Function Daily Activity Raw Score 16   Low Function Daily Activity Standardized Score 27 65   AM-PAC Applied Cognition Inpatient   Following a Speech/Presentation 2   Understanding Ordinary Conversation 2   Taking Medications 1   Remembering Where Things Are Placed or Put Away 2   Remembering List of 4-5 Errands 1   Taking Care of Complicated Tasks 1   Applied Cognition Raw Score 9   Applied Cognition Standardized Score 22 48     Kaitlin Wilcox MS, OTR/L [facility]

## 2022-09-25 NOTE — ASSESSMENT & PLAN NOTE
· Secondary to fall last Friday  IV fluids per nephrology      Results from last 7 days   Lab Units 09/25/22  0551 09/24/22  0548 09/23/22  0530 09/22/22  0756 09/21/22  0550 09/20/22  0606 09/19/22  0829   CK TOTAL U/L 793* 905* 1,482* 2,468* 5,033* 14,147* 66,728*

## 2022-09-25 NOTE — TREATMENT PLAN
Patient continuously pulling out Rivera  Was initially placed for I's and O's    Will defer re insertion for now until less confused

## 2022-09-25 NOTE — POST FALL NOTE
Pt bed alarm activated  Upon entry of room, patient seen trying to stand at side of bed and falling on buttocks  Pt  noted to be reaching for patients hand and walker when patient fell on her buttocks  Patient assisted x 2 back into bed  Patient reassessed post fall with no new abrasions or break in skin integrity  Neuro assessment unchanged from prior  SLIM made aware  Pt  and patient educated on fall precautions   verbalized understanding of education  Patient unable to learn r/t cognitive deficit at this time  Bed alarm re activated on second sensitivity level  Call bell and care items within reach  Bed locked in lowest position

## 2022-09-25 NOTE — ASSESSMENT & PLAN NOTE
· Likely source of rhabdomyolysis  No evidence of compartment syndrome fracture or DVT on imaging  · 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  · Orthopedic consulted  Repeat duplex negative for DVT  MRI ordered

## 2022-09-25 NOTE — ASSESSMENT & PLAN NOTE
· Acute encephalopathy secondary to gabapentin and morphine with subsequent renal failure  · Also had hypoglycemia requiring D50  No further hypoglycemia  · Holding both gabapentin and morphine since admission  · Neurology consulted for per persistent encephalopathy     · Patient declined EEG   · LP and MRI negative for acute pathology

## 2022-09-25 NOTE — ASSESSMENT & PLAN NOTE
· Continue venlafaxine and quetiapine  · Does have history of suicide attempt a currently does not show any signs of thoughts of self-harm

## 2022-09-26 PROBLEM — R22.31 LOCALIZED SWELLING OF RIGHT UPPER EXTREMITY: Status: ACTIVE | Noted: 2022-09-26

## 2022-09-26 LAB
ACE CSF-CCNC: <1.5 U/L (ref 0–3.1)
ANION GAP SERPL CALCULATED.3IONS-SCNC: 16 MMOL/L (ref 4–13)
BUN SERPL-MCNC: 53 MG/DL (ref 5–25)
CALCIUM SERPL-MCNC: 9.1 MG/DL (ref 8.3–10.1)
CHLORIDE SERPL-SCNC: 104 MMOL/L (ref 96–108)
CK MB SERPL-MCNC: 5.8 % (ref 0–2.5)
CK MB SERPL-MCNC: 57.7 NG/ML (ref 0–5)
CK SERPL-CCNC: 990 U/L (ref 26–192)
CO2 SERPL-SCNC: 22 MMOL/L (ref 21–32)
CREAT SERPL-MCNC: 7.63 MG/DL (ref 0.6–1.3)
ERYTHROCYTE [DISTWIDTH] IN BLOOD BY AUTOMATED COUNT: 13.6 % (ref 11.6–15.1)
GFR SERPL CREATININE-BSD FRML MDRD: 5 ML/MIN/1.73SQ M
GLUCOSE SERPL-MCNC: 80 MG/DL (ref 65–140)
HCT VFR BLD AUTO: 39 % (ref 34.8–46.1)
HGB BLD-MCNC: 12.5 G/DL (ref 11.5–15.4)
MCH RBC QN AUTO: 32.5 PG (ref 26.8–34.3)
MCHC RBC AUTO-ENTMCNC: 32.1 G/DL (ref 31.4–37.4)
MCV RBC AUTO: 101 FL (ref 82–98)
PLATELET # BLD AUTO: 310 THOUSANDS/UL (ref 149–390)
PMV BLD AUTO: 9.8 FL (ref 8.9–12.7)
POTASSIUM SERPL-SCNC: 3.6 MMOL/L (ref 3.5–5.3)
RBC # BLD AUTO: 3.85 MILLION/UL (ref 3.81–5.12)
SODIUM SERPL-SCNC: 142 MMOL/L (ref 135–147)
WBC # BLD AUTO: 11.15 THOUSAND/UL (ref 4.31–10.16)

## 2022-09-26 RX ORDER — FUROSEMIDE 10 MG/ML
80 INJECTION INTRAMUSCULAR; INTRAVENOUS ONCE
Status: COMPLETED | OUTPATIENT
Start: 2022-09-26 | End: 2022-09-26

## 2022-09-26 RX ORDER — SODIUM CHLORIDE, SODIUM GLUCONATE, SODIUM ACETATE, POTASSIUM CHLORIDE, MAGNESIUM CHLORIDE, SODIUM PHOSPHATE, DIBASIC, AND POTASSIUM PHOSPHATE .53; .5; .37; .037; .03; .012; .00082 G/100ML; G/100ML; G/100ML; G/100ML; G/100ML; G/100ML; G/100ML
100 INJECTION, SOLUTION INTRAVENOUS CONTINUOUS
Status: DISCONTINUED | OUTPATIENT
Start: 2022-09-26 | End: 2022-09-29

## 2022-09-26 RX ADMIN — SODIUM BICARBONATE 125 ML/HR: 84 INJECTION, SOLUTION INTRAVENOUS at 03:12

## 2022-09-26 RX ADMIN — SODIUM CHLORIDE, SODIUM GLUCONATE, SODIUM ACETATE, POTASSIUM CHLORIDE AND MAGNESIUM CHLORIDE 125 ML/HR: 526; 502; 368; 37; 30 INJECTION, SOLUTION INTRAVENOUS at 17:11

## 2022-09-26 RX ADMIN — Medication 1 PATCH: at 08:24

## 2022-09-26 RX ADMIN — HEPARIN SODIUM 5000 UNITS: 5000 INJECTION INTRAVENOUS; SUBCUTANEOUS at 05:24

## 2022-09-26 RX ADMIN — DEXTROSE MONOHYDRATE 1000 MG: 5 INJECTION, SOLUTION INTRAVENOUS at 19:01

## 2022-09-26 RX ADMIN — HEPARIN SODIUM 5000 UNITS: 5000 INJECTION INTRAVENOUS; SUBCUTANEOUS at 22:21

## 2022-09-26 RX ADMIN — THIAMINE HYDROCHLORIDE 500 MG: 100 INJECTION, SOLUTION INTRAMUSCULAR; INTRAVENOUS at 13:00

## 2022-09-26 RX ADMIN — HEPARIN SODIUM 5000 UNITS: 5000 INJECTION INTRAVENOUS; SUBCUTANEOUS at 13:00

## 2022-09-26 RX ADMIN — FUROSEMIDE 80 MG: 10 INJECTION, SOLUTION INTRAVENOUS at 10:59

## 2022-09-26 RX ADMIN — QUETIAPINE FUMARATE 25 MG: 25 TABLET ORAL at 22:21

## 2022-09-26 RX ADMIN — THIAMINE HYDROCHLORIDE 500 MG: 100 INJECTION, SOLUTION INTRAMUSCULAR; INTRAVENOUS at 22:21

## 2022-09-26 NOTE — PLAN OF CARE
Problem: PAIN - ADULT  Goal: Verbalizes/displays adequate comfort level or baseline comfort level  Description: Interventions:  - Encourage patient to monitor pain and request assistance  - Assess pain using appropriate pain scale  - Administer analgesics based on type and severity of pain and evaluate response  - Implement non-pharmacological measures as appropriate and evaluate response  - Consider cultural and social influences on pain and pain management  - Notify physician/advanced practitioner if interventions unsuccessful or patient reports new pain  9/25/2022 2010 by Elizabeth Meléndez RN  Outcome: Progressing  9/25/2022 0633 by Elizabeth Meléndez RN  Outcome: Progressing

## 2022-09-26 NOTE — ASSESSMENT & PLAN NOTE
· Continue venlafaxine and quetiapine  · Does have history of suicide attempt a currently does not show any signs of thoughts of self-harm  · Potentially a psychiatric component of the patient's current encephalopathy

## 2022-09-26 NOTE — PROGRESS NOTES
Progress Note - Orthopedics   Cristal Braden 45 y o  female MRN: [id number]  Unit/Bed#: [place] [address] -01 Encounter: [id number]    Assessment:  46 y/o female with diffuse right lower extremity swelling of unknown etiology  Plan:  · MRI order of right lower extremity ordered yesterday, still awaiting results, patient will require sedation to perform, SLIM notified yesterday  · Repeat venous duplex done yesterday to include the upper leg negative for DVT  · Continue medical management per SLIM, neurology, and nephrology  · Continue elevation of RLE  · Pain control PRN  · Ortho will follow       Subjective: 46 y/o female with altered mental status due to encephalopathy  She continues to remain confused today and is unable to participate in her physical exam or history  She is unsure of where she is today  She is sitting up at the end of her bed this morning  Vitals: Blood pressure 148/91, pulse (!) 109, temperature 99 4 °F (37 4 °C), resp  rate 20, height 5' 6" (1 676 m), weight 67 9 kg (149 lb 11 1 oz), last menstrual period 03/14/2019, SpO2 95 %  ,Body mass index is 24 16 kg/m²  Intake/Output Summary (Last 24 hours) at 9/26/2022 1053  Last data filed at 9/25/2022 1748  Gross per 24 hour   Intake 600 ml   Output --   Net 600 ml       Invasive Devices  Report    Peripheral Intravenous Line  Duration           Peripheral IV 09/24/22 Right Antecubital 1 day                Ortho Exam:  Inspection- diffuse swelling of the right lower extremity from the hip to the foot  No ecchymosis present  There is an abrasion with surrounding erythema noted in the pretibial area  Palpation- +diffuse pitting edema of the right lower extremity from hip to the foot  All compartments soft and compressible  Diffusely tender throughout leg to palpation, no point tenderness noted  ROM- difficult to assess, however she does have spontaneous movement of the leg    Neurovascular- Patient is still unable to follow commands to determine sensation and motor function  Palpable pedal pulse  Cap refill <3 sec  Lab, Imaging and other studies:   I have personally reviewed pertinent lab results    CBC:   Lab Results   Component Value Date    WBC 11 15 (H) 09/26/2022    HGB 12 5 09/26/2022    HCT 39 0 09/26/2022     (H) 09/26/2022     09/26/2022    MCH 32 5 09/26/2022    MCHC 32 1 09/26/2022    RDW 13 6 09/26/2022    MPV 9 8 09/26/2022     CMP:   Lab Results   Component Value Date    SODIUM 142 09/26/2022     09/26/2022    CO2 22 09/26/2022    BUN 53 (H) 09/26/2022    CREATININE 7 63 (H) 09/26/2022    CALCIUM 9 1 09/26/2022    EGFR 5 09/26/2022     PT/INR: No results found for: PT, INR    Imaging Studies: I have personally reviewed pertinent films in PACS   X-ray right ankle-  no acute osseous deformity  X-ray right foot- no acute osseous deformity  X-ray right hip- no acute osseous deformity, degenerative changes in the visualized lumbar spine

## 2022-09-26 NOTE — ASSESSMENT & PLAN NOTE
Evidence of streaking of the right upper extremity  Will obtain blood cultures  Questionable if related to infection and/or from rhabdomyolysis  Ceftriaxone day 1

## 2022-09-26 NOTE — SPEECH THERAPY NOTE
Speech Language/Pathology    Speech/Language Pathology Progress Note    Patient Name: Anton Pickett  Today's Date: 2022     Problem List  Principal Problem:    Encephalopathy acute  Active Problems:    Depression    Tobacco abuse    DDD (degenerative disc disease), lumbosacral    GERD (gastroesophageal reflux disease)    ARF (acute renal failure) (HCC)    Transaminitis    Abnormal CT of the chest    Traumatic rhabdomyolysis (HCC)    D-dimer, elevated    Leg edema, right       Past Medical History  Past Medical History:   Diagnosis Date   • Chronic pain disorder    • Depression    • GERD (gastroesophageal reflux disease)    • History of electroconvulsive therapy    • Low back pain    • Self-injurious behavior    • Suicide attempt [facility])         Past Surgical History  Past Surgical History:   Procedure Laterality Date   •  SECTION     • COLONOSCOPY     • PANCREAS SURGERY      "pseudocysts" per patient's  Ricki Infante   • [place] ESOPHAGOGASTRODUODENOSCOPY TRANSORAL DIAGNOSTIC N/A 4/10/2018    Procedure: EGD AND COLONOSCOPY;  Surgeon: Herb Seay MD;  [place] [facility]  GI [facility]; Service: Gastroenterology         Subjective:  Eyes closed most of session  Seems a bit restless  Objective:  Noted pt was made NPO due to not swallowing meds  Per nurse, she stated she couldn't  Pt was seated upright  Mouth and lips were very dry  She did not answer most questions  Agreeable to ice chips which she tolerated wnl  Accepted a few tsps applesauce w/ mildly slow transfer, prompt swallow  Responded "yes" when I asked if she wanted a drink  Took the apple juice today w/out biting the straw (she was biting on the straw when last seen)  ? Mildly reduced control but tolerated wfl  After trials she refused any more  "no more"  Assessment:  Able to swallow when she is agreeable  Decline in mental status since admit  Poor po intake which has declined since admit also  ? If she will be accepting vs refusing   Took only a small amt puree and thin today  Nutritional risk  Plan/Recommendations:  Full liquids for now  Offer/encourage as she will accept when upright and alert  ? May need alternate nutrition  D/w nurse and dr vasquez

## 2022-09-26 NOTE — ASSESSMENT & PLAN NOTE
· Lumbar radiculopathy due for surgery  · Prior to admission was on gabapentin and morphine IR 15 mg   · Confirmed on   Holding both for now given kidney injury    · Monitor for gabapentin withdrawal

## 2022-09-26 NOTE — PROGRESS NOTES
NEPHROLOGY PROGRESS NOTE   Vidhya Pettit 45 y o  female MRN: [id number]  Unit/Bed#: [place] 2 -01 Encounter: [id number]  Reason for Consult: ANUJA (POA)    68-year-old female with history of depression, GERD, tobacco abuse, who presents with change in mental status  Per outpatient record, patient suffered a fall on Friday  She became increasingly lethargic and sedentary over the weekend with decreased oral intake and worsening right leg pain  Nephrology consulted for acute kidney injury  ASSESSMENT/PLAN:  Acute kidney injury (POA):  Suspected ATN in the setting of rhabdomyolysis  -baseline creatinine less than 1 0   -presented with creatinine of 5 88   -creatinine appears to have plateaued around 2 88   -will continue on IV fluids  -will give Lasix 80 mg IV x1 today  -UA:  Large blood, 0-1 RBCs, trace protein   -renal imaging with mild nonspecific bilateral perinephric fat stranding, negative for hydro   -avoid nephrotoxins, hypotension, IV contrast   -strict I/O, patient pulled out to Rivera catheters  Not appropriate for pure [facility]  Nursing reports to episodes of incontinence today completely saturating the bed   -consent obtained for dialysis and in patient's chart  No emergent need for HD today   -will make NPO after midnight for possible dialysis if renal function continues to worsen inpatient remains confused  Traumatic rhabdo: With fall last Friday  -CPK level trending downward  Initially greater than 32,000, currently 990, slightly increased from previous day   -continue on IV hydration, increased to 150 cc/hr   -continues to have poor oral intake  Anion gap Metabolic acidosis: In the setting of acute kidney injury and receiving large amounts of normal saline   -currently on sodium bicarbonate drip, will continue x 1 more liter and then change fluids to isolyte  Potassium level trending downward with CO2 of 22      Acute encephalopathy:  Suspected due to gabapentin and morphine as well as possible renal failure contributing  Continues to be confused and not answering questions  -neurology team following  Patient declined EEG  LPN MRI negative for acute pathology  Transaminitis:  Likely due to rhabdo  -GI team following  No evidence of liver injury on CT imaging   -continues with fluid resuscitation  Right lower extremity edema:  No evidence of compartment syndrome at this time   -lower extremity duplex negative for DVT  -CPK level slightly increased today from previous day  -MRI ordered  -orthopedic team following  Other:  GERD, tobacco abuse, depression  Disposition:  Requiring additional stay due to medical needs  SUBJECTIVE:  The patient is resting in her bed  She opens her eyes to speech  She is not answering my questions  She is restless in her bed  Per nursing team, she has pulled out 2 Rivera catheters  She continues to have poor oral intake      OBJECTIVE:  Current Weight: Weight - Scale: 67 9 kg (149 lb 11 1 oz)  Vitals:    09/25/22 1522 09/26/22 0559 09/26/22 0747 09/26/22 0747   BP: 147/90  148/91 148/91   BP [place] Left arm      Pulse: (!) 110   (!) 109   Resp: 18  20    Temp: (!) 96 8 °F (36 °C)  99 4 °F (37 4 °C) 99 4 °F (37 4 °C)   TempSrc: Oral      SpO2: 97%   95%   Weight:  67 9 kg (149 lb 11 1 oz)     Height:           Intake/Output Summary (Last 24 hours) at 9/26/2022 1038  Last data filed at 9/25/2022 1748  Gross per 24 hour   Intake 600 ml   Output --   Net 600 ml     General: NAD  Skin:  Generalized ecchymotic areas and scabs  HEENT:  Dry mucous membranes, sclera anicteric, normocephalic, atraumatic  Neck: No apparent JVD appreciated  Chest: lung sounds clear B/L, on RA   CVS:Regular rate and rhythm, no murmer   Abdomen: Soft, round, non-tender, +BS  Extremities: No B/L LE edema present  Neuro:  Confused, disoriented, not answering questions  Psych:  Restless    Medications:    Current Facility-Administered Medications:   •  acetaminophen (TYLENOL) tablet 650 mg, 650 mg, Oral, Q6H PRN, Atul Adan DO, 650 mg at 09/22/22 1040  •  heparin (porcine) subcutaneous injection 5,000 Units, 5,000 Units, Subcutaneous, Q8H [facility], Atul DO Loco, 5,000 Units at 09/26/22 4819  •  HYDROmorphone (DILAUDID) injection 0 5 mg, 0 5 mg, Intravenous, Q4H PRN, Brooke Smyth, DO, 0 5 mg at 09/25/22 0230  •  LORazepam (ATIVAN) injection 1 mg, 1 mg, Intravenous, Once, Atul Adan DO  •  nicotine (NICODERM CQ) 14 mg/24hr TD 24 hr patch 1 patch, 1 patch, Transdermal, Daily, Atul Adan DO, 1 patch at 09/26/22 0824  •  ondansetron (ZOFRAN) injection 4 mg, 4 mg, Intravenous, Q4H PRN, Brooke Smyth, DO  •  QUEtiapine (SEROquel) tablet 25 mg, 25 mg, Oral, HS, Atul Adan DO, 25 mg at 09/25/22 2118  •  sodium bicarbonate 75 mEq in sodium chloride 0 45 % 1,000 mL infusion, 125 mL/hr, Intravenous, Continuous, [facility], Last Rate: 125 mL/hr at 09/26/22 0312, 125 mL/hr at 09/26/22 1563  •  venlafaxine (EFFEXOR-XR) 24 hr capsule 150 mg, 150 mg, Oral, Daily, Atul Adan DO, 150 mg at 09/24/22 0800    Laboratory Results:  Results from last 7 days   Lab Units 09/26/22  0454 09/26/22  0452 09/25/22  0551 09/24/22  0548 09/23/22  0824 09/23/22  0530 09/21/22  0905 09/21/22  0550   WBC Thousand/uL  --  11 15*  --  7 44  --  7 64  --  5 97   HEMOGLOBIN g/dL  --  12 5  --  11 1*  --  11 5  --  12 2   HEMATOCRIT %  --  39 0  --  35 4  --  36 2  --  37 7   PLATELETS Thousands/uL  --  310  --  297  --  312  --  329   SODIUM mmol/L 142  --  143 140   < > 138   < >  --    POTASSIUM mmol/L 3 6  --  4 3 4 6   < > 4 7   < >  --    CHLORIDE mmol/L 104  --  107 108   < > 104   < >  --    CO2 mmol/L 22  --  20* 18*   < > 20*   < >  --    BUN mg/dL 53*  --  51* 52*   < > 52*   < >  --    CREATININE mg/dL 7 63*  --  7 67* 7 55*   < > 7 15*   < >  --    CALCIUM mg/dL 9 1  --  8 4 8 2*   < > 8 1*   < >  --    PHOSPHORUS mg/dL  --   --   --   --   --   --   --  7 2*   ALK PHOS U/L --   --  67 67  --  69  --  73   ALT U/L  --   --  215* 240*  --  292*  --  407*   AST U/L  --   --  82* 111*  --  167*  --  489*    < > = values in this interval not displayed

## 2022-09-26 NOTE — PLAN OF CARE
Problem: PAIN - ADULT  Goal: Verbalizes/displays adequate comfort level or baseline comfort level  Description: Interventions:  - Encourage patient to monitor pain and request assistance  - Assess pain using appropriate pain scale  - Administer analgesics based on type and severity of pain and evaluate response  - Implement non-pharmacological measures as appropriate and evaluate response  - Consider cultural and social influences on pain and pain management  - Notify physician/advanced practitioner if interventions unsuccessful or patient reports new pain  9/25/2022 2010 by Naida Sanderson RN  Outcome: Progressing  9/25/2022 2010 by Naida Sanderson RN  Outcome: Progressing  9/25/2022 0633 by Naida Sanderson RN  Outcome: Progressing

## 2022-09-26 NOTE — ARC ADMISSION
Reviewed updates with [facility] physician - will continue to follow patient's case at this time, monitoring for medical stability and functional progress  Will update as able

## 2022-09-26 NOTE — PLAN OF CARE
Problem: SAFETY ADULT  Goal: Patient will remain free of falls  Description: INTERVENTIONS:  - Educate patient/family on patient safety including physical limitations  - Instruct patient to call for assistance with activity   - Consult OT/PT to assist with strengthening/mobility   - Keep Call bell within reach  - Keep bed low and locked with side rails adjusted as appropriate  - Keep care items and personal belongings within reach  - Initiate and maintain comfort rounds  - Make Fall Risk Sign visible to staff  - Offer Toileting every 3 Hours, in advance of need  - Initiate/Maintain   alarm  - Obtain necessary fall risk management equipment:   - Apply yellow socks and bracelet for high fall risk patients  - Consider moving patient to room near nurses station  Outcome: Progressing  Goal: Maintain or return to baseline ADL function  Description: INTERVENTIONS:  -  Assess patient's ability to carry out ADLs; assess patient's baseline for ADL function and identify physical deficits which impact ability to perform ADLs (bathing, care of mouth/teeth, toileting, grooming, dressing, etc )  - Assess/evaluate cause of self-care deficits   - Assess range of motion  - Assess patient's mobility; develop plan if impaired  - Assess patient's need for assistive devices and provide as appropriate  - Encourage maximum independence but intervene and supervise when necessary  - Involve family in performance of ADLs  - Assess for home care needs following discharge   - Consider OT consult to assist with ADL evaluation and planning for discharge  - Provide patient education as appropriate  Outcome: Progressing  Goal: Maintains/Returns to pre admission functional level  Description: INTERVENTIONS:  - Perform BMAT or MOVE assessment daily    - Set and communicate daily mobility goal to care team and patient/family/caregiver     - Collaborate with rehabilitation services on mobility goals if consulted  - Perform Range of Motion 3 times a day   - Reposition patient every 2 hours  - Dangle patient 3 times a day  - Stand patient 3 times a day  - Ambulate patient 3 times a day  - Out of bed to chair 3 times a day   - Out of bed for meals 3 times a day  - Out of bed for toileting  - Record patient progress and toleration of activity level   Outcome: Progressing     Problem: Knowledge Deficit  Goal: Patient/family/caregiver demonstrates understanding of disease process, treatment plan, medications, and discharge instructions  Description: Complete learning assessment and assess knowledge base  Interventions:  - Provide teaching at level of understanding  - Provide teaching via preferred learning methods  Outcome: Progressing     Problem: Potential for Falls  Goal: Patient will remain free of falls  Description: INTERVENTIONS:  - Educate patient/family on patient safety including physical limitations  - Instruct patient to call for assistance with activity   - Consult OT/PT to assist with strengthening/mobility   - Keep Call bell within reach  - Keep bed low and locked with side rails adjusted as appropriate  - Keep care items and personal belongings within reach  - Initiate and maintain comfort rounds  - Make Fall Risk Sign visible to staff  - Offer Toileting every 2 Hours, in advance of need  - Initiate/Maintain   alarm  - Obtain necessary fall risk management equipment:     - Apply yellow socks and bracelet for high fall risk patients  - Consider moving patient to room near nurses station  Outcome: Progressing     Problem: MOBILITY - ADULT  Goal: Maintain or return to baseline ADL function  Description: INTERVENTIONS:  -  Assess patient's ability to carry out ADLs; assess patient's baseline for ADL function and identify physical deficits which impact ability to perform ADLs (bathing, care of mouth/teeth, toileting, grooming, dressing, etc )  - Assess/evaluate cause of self-care deficits   - Assess range of motion  - Assess patient's mobility; develop plan if impaired  - Assess patient's need for assistive devices and provide as appropriate  - Encourage maximum independence but intervene and supervise when necessary  - Involve family in performance of ADLs  - Assess for home care needs following discharge   - Consider OT consult to assist with ADL evaluation and planning for discharge  - Provide patient education as appropriate  Outcome: Progressing  Goal: Maintains/Returns to pre admission functional level  Description: INTERVENTIONS:  - Perform BMAT or MOVE assessment daily    - Set and communicate daily mobility goal to care team and patient/family/caregiver  - Collaborate with rehabilitation services on mobility goals if consulted  - Perform Range of Motion 3 times a day  - Reposition patient every 2 hours    - Dangle patient 3 times a day  - Stand patient 3 times a day  - Ambulate patient 3 times a day  - Out of bed to chair 3 times a day   - Out of bed for meals 3 times a day  - Out of bed for toileting  - Record patient progress and toleration of activity level   Outcome: Progressing

## 2022-09-26 NOTE — PROGRESS NOTES
[facility]  Progress Note - Chante Small 1970, 45 y o  female MRN: [id number]  Unit/Bed#: [place] -01 Encounter: [id number]  Primary Care Provider: Luna Barthel, MD   Date and time admitted to hospital: 9/19/2022  8:04 AM    * Encephalopathy acute  Assessment & Plan  · Acute encephalopathy secondary to gabapentin and morphine with subsequent renal failure, potentially component of psychiatric illness  · Will start on thiamine high-dose given potential concern for Wernicke's encephalopathy  · Holding both gabapentin and morphine since admission  · LP and MRI negative for acute pathology    Localized swelling of right upper extremity  Assessment & Plan  Evidence of streaking of the right upper extremity  Will obtain blood cultures  Questionable if related to infection and/or from rhabdomyolysis  Ceftriaxone day 1  Leg edema, right  Assessment & Plan  · Likely source of rhabdomyolysis  No evidence of compartment syndrome fracture or DVT on imaging  · 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  · Orthopedic consulted  Repeat duplex negative for DVT  MRI ordered for further characterization      D-dimer, elevated  Assessment & Plan  · Elevated D-dimer may be related to fall  · Lower extremity duplex negative for DVT  · Renal dysfunction cannot check CTA of chest but doubt PE  · Discontinued heparin infusion and transitioned to prophylaxis dose    Traumatic rhabdomyolysis [facility])  Assessment & Plan  · Secondary to fall last Friday  IV fluids per nephrology  Results from last 7 days   Lab Units 09/26/22  0454 09/25/22  0551 09/24/22  0548 09/23/22  0530 09/22/22  0756 09/21/22  0550 09/20/22  0606   CK TOTAL U/L 990* 793* 905* 1,482* 2,468* 5,033* 14,147*       Abnormal CT of the chest  Assessment & Plan  · Left-sided opacities noted  COVID negative    · Procalcitonin only minimally elevated without evidence of active infection    Results from last 7 days Lab Units 09/20/22  0606   PROCALCITONIN ng/ml 0 31*       Transaminitis  Assessment & Plan  · Secondary to rhabdomyolysis  No evidence of liver injury on CT imaging  · Seen by GI  Hepatic duplex also negative  Results from last 7 days   Lab Units 09/25/22  0551 09/24/22  0548 09/23/22  0530 09/21/22  0550 09/20/22  0606   AST U/L 82* 111* 167* 489* 970*   ALT U/L 215* 240* 292* 407* 512*   TOTAL BILIRUBIN mg/dL 0 42 0 41 0 40 0 30 0 35       ARF (acute renal failure) (HCC)  Assessment & Plan  · ANUJA/ATN secondary to rhabdomyolysis  No evidence of renal obstruction on imaging  · Appreciate nephrology evaluation  Awaiting renal recovery  · Continue IV fluids with sodium bicarbonate  · No emergent need for hemodialysis a patient has been consented  · IV furosemide x1 ordered today  · Ongoing planning for potential dialysis tomorrow as patient has not had renal recovery    Results from last 7 days   Lab Units 09/26/22  0454 09/25/22  0551 09/24/22  0548 09/23/22  0824 09/23/22  0530 09/23/22  0132 09/22/22  1656 09/22/22  0756 09/21/22  1415   BUN mg/dL 53* 51* 52* 54* 52* 54* 52* 51* 54*   CREATININE mg/dL 7 63* 7 67* 7 55* 6 98* 7 15* 7 15* 7 10* 6 81* 6 74*   EGFR ml/min/1 73sq m 5 5 5 6 5 5 5 6 6       DDD (degenerative disc disease), lumbosacral  Assessment & Plan  · Lumbar radiculopathy due for surgery  · Prior to admission was on gabapentin and morphine IR 15 mg   · Confirmed on   Holding both for now given kidney injury    · Monitor for gabapentin withdrawal    Tobacco abuse  Assessment & Plan  · Nicotine patch ordered    Depression  Assessment & Plan  · Continue venlafaxine and quetiapine  · Does have history of suicide attempt a currently does not show any signs of thoughts of self-harm  · Potentially a psychiatric component of the patient's current encephalopathy    GERD (gastroesophageal reflux disease)-resolved as of 9/26/2022  Assessment & Plan  · Holding PPI given renal injury    [facility] Progress Note  Patient: Casie Fallon 45 y o  female   MRN: [id number]  PCP: Devika Bowser MD  [facility] Encounter: [id number]  Date Of Visit: 09/26/22    Assessment:    Principal Problem:    Encephalopathy acute  Active Problems:    Depression    Tobacco abuse    DDD (degenerative disc disease), lumbosacral    ARF (acute renal failure) (HCC)    Transaminitis    Abnormal CT of the chest    Traumatic rhabdomyolysis (HCC)    D-dimer, elevated    Leg edema, right    Localized swelling of right upper extremity      Plan:    · Start high-dose thiamine  · Unfortunately patient with overwhelming burden of renal disease my require hemodialysis  · Prognosis remains guarded  · Start ceftriaxone given evidence of streaking on right upper extremity       VTE Pharmacologic Prophylaxis:   Pharmacologic: Heparin  Mechanical VTE Prophylaxis in Place: Yes    Patient Centered Rounds: I have performed bedside rounds with nursing staff today  Discussions with Specialists or Other Care Team Provider:  Discussed with Nephrology    Education and Discussions with Family / Patient:  Patient  at bedside    Time Spent for Care: 45 minutes  More than 50% of total time spent on counseling and coordination of care as described above  Current Length of Stay: 7 day(s)    Current Patient Status: Inpatient   Certification Statement: The patient will continue to require additional inpatient hospital stay due to Potential dialysis, confusion    Discharge Plan / Estimated Discharge Date: To be determined    Code Status: Level 1 - Full Code      Subjective:   Seen examined, patient is alert oriented to person only  Remains somewhat confused which per her  is not her baseline  Right lower extremity swelling still persists    Denies any nausea or vomiting    A complete and comprehensive 14 point organ system review has been performed and all other systems are negative other than stated above     Objective:     Vitals:   Temp (24hrs), Av 2 °F (37 3 °C), Min:98 8 °F (37 1 °C), Max:99 4 °F (37 4 °C)    Temp:  [98 8 °F (37 1 °C)-99 4 °F (37 4 °C)] 98 8 °F (37 1 °C)  HR:  [105-124] 111  Resp:  [20] 20  BP: (148-177)/() 169/82  SpO2:  [95 %-99 %] 99 %  Body mass index is 24 16 kg/m²  Input and Output Summary (last 24 hours):     No intake or output data in the 24 hours ending 22 0968    Physical Exam:     General: well appearing, no acute distress  HEENT: atraumatic, PERRLA, moist mucosa, normal pharynx, normal tonsils and adenoids, normal tongue, no fluid in sinuses  Neck: Trachea midline, no carotid bruit, no masses  Respiratory: normal chest wall expansion, CTA B, no r/r/w, no rubs  Cardiovascular: RRR, no m/r/g, Normal S1 and S2  Abdomen: Soft, non-tender, non-distended, normal bowel sounds in all quadrants, no hepatosplenomegaly, no tympany  Rectal: deferred  Musculoskeletal: normal ROM in upper and lower extremities  Integumentary: warm, dry, and pink, some streaking of the right upper extremity  Heme/Lymph: no lymphadenopathy, no bruises  Neurological: Cranial Nerves II-XII grossly intact, no tics, normal sensation to pressure and light touch  Psychiatric: cooperative with normal mood, affect, and cognition      Additional Data:     Labs:    Results from last 7 days   Lab Units 22  0452   WBC Thousand/uL 11 15*   HEMOGLOBIN g/dL 12 5   HEMATOCRIT % 39 0   PLATELETS Thousands/uL 310     Results from last 7 days   Lab Units 22  0454 22  0551   POTASSIUM mmol/L 3 6 4 3   CHLORIDE mmol/L 104 107   CO2 mmol/L 22 20*   BUN mg/dL 53* 51*   CREATININE mg/dL 7 63* 7 67*   CALCIUM mg/dL 9 1 8 4   ALK PHOS U/L  --  67   ALT U/L  --  215*   AST U/L  --  82*     Results from last 7 days   Lab Units 22  0530   INR  1 03       * I Have Reviewed All Lab Data Listed Above  * Additional Pertinent Lab Tests Reviewed:  Matilde 64 Admission Reviewed    Imaging:    Imaging Reports Reviewed Today Include:  None  Imaging Personally Reviewed by Myself Includes:  None    Recent Cultures (last 7 days):     Results from last 7 days   Lab Units 09/23/22  1608   GRAM STAIN RESULT  No Polys or Bacteria seen       Last 24 Hours Medication List:   Current Facility-Administered Medications   Medication Dose Route Frequency Provider Last Rate   • acetaminophen  650 mg Oral Q6H PRN David Hadley DO     • cefTRIAXone  1,000 mg Intravenous Q24H Paige Serna DO     • heparin (porcine)  5,000 Units Subcutaneous Q8H [address] Atul Adan DO     • HYDROmorphone  0 5 mg Intravenous Q4H PRN David Hadley DO     • LORazepam  1 mg Intravenous Once David Hadley DO     • multi-electrolyte  125 mL/hr Intravenous Continuous Edith Menkristal, CRNP 125 mL/hr (09/26/22 1711)   • nicotine  1 patch Transdermal Daily Atul Adan DO     • ondansetron  4 mg Intravenous Q4H PRN David Hadley DO     • QUEtiapine  25 mg Oral HS Atul Adan DO     • sodium bicarbonate infusion  150 mL/hr Intravenous Continuous Edith Mina, CRNP 150 mL/hr (09/26/22 1059)   • thiamine  500 mg Intravenous TID Paige Serna  mg (09/26/22 1300)   • venlafaxine  150 mg Oral Daily David Hadley DO          Today, Patient Was Seen By: Paige Serna DO    ** Please Note: This note was completed in part utilizing M-Modal Fluency Direct Software  Grammatical errors, random word insertions, spelling mistakes, and incomplete sentences may be an occasional consequence of this system secondary to software limitations, ambient noise, and hardware issues  If you have any questions or concerns about the content, text, or information contained within the body of this dictation, please contact the provider for clarification   **

## 2022-09-26 NOTE — UTILIZATION REVIEW
Continued Stay Review    Date: 9/25/22                  Current Patient Class: IP Current Level of Care: MS    HPI:52 y o  female initially admitted on 9/19    Assessment/Plan: ANUJA d/t ATN in the setting of rhabdomyolysis/pigment nephropathy  CK trending downward  No urgent need for RRT but consent has been signed  Will start 9/26 if renal function is not improved  Receiving IV Lasix 60 mg x 1 today  NPO p MN if we need HD catheter placement  Remains on IV fluids and bicarb drip  Hyponatremia and hyperkalemia resolved  On exam she is moaning, slightly more awake, confused and agitated and still cannot answer questions  LP and MRI are both negative for acute issues  Pt declined EEG  Pt had a fall onto her buttocks, no acute injuries  Therapy recommending post d/c rehab      9/25 Ortho Consult - diffuse right lower extremity swelling of unknown etiology, differential includes proximal DVT vs myositis -no compartment syndrome, repeat venous duplex to include more proximal vasculature  If no DVT will get MRI RLE to eval for subcutaneous edema or fluid collection  Elevate RLE, PRN analgesia  Venous duplex was negative  Vital Signs:   09/25/22 15:22:28 96 8 °F (36 °C) Abnormal  110 Abnormal  18 147/90 109 97 % None (Room air) Lying   09/25/22 0845 -- -- -- -- -- -- None (Room air) --   09/25/22 07:19:11 97 8 °F (36 6 °C) 110 Abnormal  18 146/89 108 96 % None (Room air) Lying   09/24/22 21:33:19 97 °F (36 1 °C) Abnormal  111 Abnormal  20 161/92 115 95 % None (Room air) Lying   09/24/22 15:22:24 97 2 °F (36 2 °C) Abnormal  114 Abnormal  17 145/95 112 97 % -- --   09/24/22 1154 -- -- -- -- -- -- None (Room air) --   09/24/22 07:51:35 97 3 °F (36 3 °C) Abnormal  116 Abnormal  18 147/103 Abnormal  118 96 % -- --     Pertinent Labs/Diagnostic Results:     BLE venous duplex - no thrombus bilat       Results from last 7 days   Lab Units 09/19/22  1401   SARS-COV-2  Negative     Results from last 7 days   Lab Units 09/24/22  0548 09/23/22  0530 09/21/22  0550   WBC Thousand/uL 7 44 7 64 5 97   HEMOGLOBIN g/dL 11 1* 11 5 12 2   HEMATOCRIT % 35 4 36 2 37 7   PLATELETS Thousands/uL 297 312 329         Results from last 7 days   Lab Units 09/25/22  0551 09/24/22  0548 09/23/22  0824 09/23/22  0530 09/21/22  0905 09/21/22  0550   SODIUM mmol/L 143 140 137 138   < >  --    POTASSIUM mmol/L 4 3 4 6 5 4* 4 7   < >  --    CHLORIDE mmol/L 107 108 104 104   < >  --    CO2 mmol/L 20* 18* 20* 20*   < >  --    ANION GAP mmol/L 16* 14* 13 14*   < >  --    BUN mg/dL 51* 52* 54* 52*   < >  --    CREATININE mg/dL 7 67* 7 55* 6 98* 7 15*   < >  --    EGFR ml/min/1 73sq m 5 5 6 5   < >  --    CALCIUM mg/dL 8 4 8 2* 8 3 8 1*   < >  --    PHOSPHORUS mg/dL  --   --   --   --   --  7 2*    < > = values in this interval not displayed       Results from last 7 days   Lab Units 09/25/22  0551 09/24/22  0548 09/23/22  0530 09/21/22  0550 09/20/22  0606   AST U/L 82* 111* 167* 489* 970*   ALT U/L 215* 240* 292* 407* 512*   ALK PHOS U/L 67 67 69 73 79   TOTAL PROTEIN g/dL 5 1* 4 9* 4 9* 5 0* 5 4*   ALBUMIN g/dL 1 8* 1 7* 1 7* 1 6* 1 8*   TOTAL BILIRUBIN mg/dL 0 42 0 41 0 40 0 30 0 35   BILIRUBIN DIRECT mg/dL  --   --   --  0 13 0 15     Results from last 7 days   Lab Units 09/20/22  1154   POC GLUCOSE mg/dl 90     Results from last 7 days   Lab Units 09/25/22  0551 09/24/22  0548 09/23/22  0824 09/23/22  0530 09/23/22  0132 09/22/22  1656 09/22/22  0756 09/21/22  1415 09/21/22  0905 09/20/22  2326 09/20/22  1526   GLUCOSE RANDOM mg/dL 68 64* 68 69 73 79 75 86 92 100 102     Results from last 7 days   Lab Units 09/20/22  0606   OSMOLALITY, SERUM mmol/     Results from last 7 days   Lab Units 09/25/22  0551 09/24/22  0548   CK TOTAL U/L 793* 905*   CK MB INDEX % 2 7* 2 8*   CK MB ng/mL 24 2* 25 4*             Results from last 7 days   Lab Units 09/23/22  0530 09/22/22  0756 09/21/22  1415 09/21/22  0550   PROTIME seconds 13 5  --   --   --    INR 1 03  --   --   --    PTT seconds  --  79* 67* 111*         Results from last 7 days   Lab Units 09/20/22  0606   PROCALCITONIN ng/ml 0 31*             Results from last 7 days   Lab Units 09/20/22  0606   HEP B S AG  Non-reactive   HEP C AB  Non-reactive   HEP B C IGM  Non-reactive                 Results from last 7 days   Lab Units 09/20/22  0606   OSMOLALITY, SERUM mmol/         Results from last 7 days   Lab Units 09/19/22  1401   INFLUENZA A PCR  Negative   INFLUENZA B PCR  Negative   RSV PCR  Negative     Results from last 7 days   Lab Units 09/23/22  1608   GRAM STAIN RESULT  No Polys or Bacteria seen         Results from last 7 days   Lab Units 09/23/22  1608   APPEARANCE CSF  clear   TUBE NUM CSF  4   WBC CSF /uL 0   XANTHOCHROMIA  No   GLUCOSE CSF mg/dL 54   PROTEIN CSF mg/dL 48*   RBC CSF uL 2         Medications:   Scheduled Medications:  heparin (porcine), 5,000 Units, Subcutaneous, Q8H CHOCO  LORazepam, 1 mg, Intravenous, Once  nicotine, 1 patch, Transdermal, Daily  QUEtiapine, 25 mg, Oral, HS  thiamine, 500 mg, Intravenous, TID  venlafaxine, 150 mg, Oral, Daily      Continuous IV Infusions:  multi-electrolyte, 125 mL/hr, Intravenous, Continuous  sodium bicarbonate infusion, 150 mL/hr, Intravenous, Continuous      PRN Meds:  acetaminophen, 650 mg, Oral, Q6H PRN  HYDROmorphone, 0 5 mg, Intravenous, Q4H PRN - x 1 9/24, 9/25  ondansetron, 4 mg, Intravenous, Q4H PRN    Discharge Plan: rehab    Network Utilization Review Department  ATTENTION: Please call with any questions or concerns to [phone] and carefully listen to the prompts so that you are directed to the right person  All voicemails are confidential   Mauri Garcia all requests for admission clinical reviews, approved or denied determinations and any other requests to dedicated fax number below belonging to the campus where the patient is receiving treatment   List of dedicated fax numbers for the Facilities:  [facility] ADMISSION DENIALS (Administrative/Medical Necessity) [phone]   [address] (Maternity/NICU/Pediatrics) Aleena Younger [address]  [phone] [address] [facility] [address] [place] [address] [place] [address] Wanda Bowman [place] [address] [facility] [place] [address] [phone]

## 2022-09-26 NOTE — ASSESSMENT & PLAN NOTE
· Secondary to rhabdomyolysis  No evidence of liver injury on CT imaging  · Seen by GI  Hepatic duplex also negative      Results from last 7 days   Lab Units 09/25/22  0551 09/24/22  0548 09/23/22  0530 09/21/22  0550 09/20/22  0606   AST U/L 82* 111* 167* 489* 970*   ALT U/L 215* 240* 292* 407* 512*   TOTAL BILIRUBIN mg/dL 0 42 0 41 0 40 0 30 0 35

## 2022-09-26 NOTE — ASSESSMENT & PLAN NOTE
· Likely source of rhabdomyolysis  No evidence of compartment syndrome fracture or DVT on imaging  · 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  · Orthopedic consulted  Repeat duplex negative for DVT    MRI ordered for further characterization

## 2022-09-26 NOTE — ASSESSMENT & PLAN NOTE
· Secondary to fall last Friday  IV fluids per nephrology      Results from last 7 days   Lab Units 09/26/22  0454 09/25/22  0551 09/24/22  0548 09/23/22  0530 09/22/22  0756 09/21/22  0550 09/20/22  0606   CK TOTAL U/L 990* 793* 905* 1,482* 2,468* 5,033* 14,147*

## 2022-09-26 NOTE — ASSESSMENT & PLAN NOTE
· Acute encephalopathy secondary to gabapentin and morphine with subsequent renal failure, potentially component of psychiatric illness  · Will start on thiamine high-dose given potential concern for Wernicke's encephalopathy  · Holding both gabapentin and morphine since admission  · LP and MRI negative for acute pathology

## 2022-09-26 NOTE — ASSESSMENT & PLAN NOTE
· ANUJA/ATN secondary to rhabdomyolysis  No evidence of renal obstruction on imaging  · Appreciate nephrology evaluation  Awaiting renal recovery  · Continue IV fluids with sodium bicarbonate    · No emergent need for hemodialysis a patient has been consented  · IV furosemide x1 ordered today  · Ongoing planning for potential dialysis tomorrow as patient has not had renal recovery    Results from last 7 days   Lab Units 09/26/22  0454 09/25/22  0551 09/24/22  0548 09/23/22  0824 09/23/22  0530 09/23/22  0132 09/22/22  1656 09/22/22  0756 09/21/22  1415   BUN mg/dL 53* 51* 52* 54* 52* 54* 52* 51* 54*   CREATININE mg/dL 7 63* 7 67* 7 55* 6 98* 7 15* 7 15* 7 10* 6 81* 6 74*   EGFR ml/min/1 73sq m 5 5 5 6 5 5 5 6 6

## 2022-09-27 ENCOUNTER — APPOINTMENT (INPATIENT)
Dept: NON INVASIVE DIAGNOSTICS | Facility: HOSPITAL | Age: 52
End: 2022-09-27

## 2022-09-27 PROBLEM — B95.61 BACTEREMIA DUE TO METHICILLIN SUSCEPTIBLE STAPHYLOCOCCUS AUREUS (MSSA): Status: ACTIVE | Noted: 2022-09-27

## 2022-09-27 PROBLEM — R78.81 BACTEREMIA DUE TO METHICILLIN SUSCEPTIBLE STAPHYLOCOCCUS AUREUS (MSSA): Status: ACTIVE | Noted: 2022-09-27

## 2022-09-27 LAB
ANION GAP SERPL CALCULATED.3IONS-SCNC: 16 MMOL/L (ref 4–13)
AORTIC ROOT: 2.8 CM
APICAL FOUR CHAMBER EJECTION FRACTION: 63 %
ASCENDING AORTA: 3.1 CM
BASOPHILS # BLD AUTO: 0.06 THOUSANDS/ÂΜL (ref 0–0.1)
BASOPHILS NFR BLD AUTO: 1 % (ref 0–1)
BUN SERPL-MCNC: 50 MG/DL (ref 5–25)
CALCIUM SERPL-MCNC: 8.8 MG/DL (ref 8.3–10.1)
CHLORIDE SERPL-SCNC: 100 MMOL/L (ref 96–108)
CK MB SERPL-MCNC: 3.7 % (ref 0–2.5)
CK MB SERPL-MCNC: 31.2 NG/ML (ref 0–5)
CK SERPL-CCNC: 848 U/L (ref 26–192)
CO2 SERPL-SCNC: 24 MMOL/L (ref 21–32)
CREAT SERPL-MCNC: 7.08 MG/DL (ref 0.6–1.3)
EOSINOPHIL # BLD AUTO: 0.01 THOUSAND/ÂΜL (ref 0–0.61)
EOSINOPHIL NFR BLD AUTO: 0 % (ref 0–6)
ERYTHROCYTE [DISTWIDTH] IN BLOOD BY AUTOMATED COUNT: 13.7 % (ref 11.6–15.1)
FRACTIONAL SHORTENING: 36 (ref 28–44)
GFR SERPL CREATININE-BSD FRML MDRD: 6 ML/MIN/1.73SQ M
GLUCOSE SERPL-MCNC: 81 MG/DL (ref 65–140)
HCT VFR BLD AUTO: 37.6 % (ref 34.8–46.1)
HGB BLD-MCNC: 12.2 G/DL (ref 11.5–15.4)
IMM GRANULOCYTES # BLD AUTO: 0.05 THOUSAND/UL (ref 0–0.2)
IMM GRANULOCYTES NFR BLD AUTO: 0 % (ref 0–2)
INTERVENTRICULAR SEPTUM IN DIASTOLE (PARASTERNAL SHORT AXIS VIEW): 1 CM
INTERVENTRICULAR SEPTUM: 1 CM (ref 0.6–1.1)
LAAS-AP2: 18.4 CM2
LAAS-AP4: 18.2 CM2
LACTATE SERPL-SCNC: 1.5 MMOL/L (ref 0.5–2)
LEFT ATRIUM SIZE: 3.7 CM
LEFT INTERNAL DIMENSION IN SYSTOLE: 2.9 CM (ref 2.1–4)
LEFT VENTRICULAR INTERNAL DIMENSION IN DIASTOLE: 4.5 CM (ref 3.5–6)
LEFT VENTRICULAR POSTERIOR WALL IN END DIASTOLE: 1 CM
LEFT VENTRICULAR STROKE VOLUME: 61 ML
LVSV (TEICH): 61 ML
LYMPHOCYTES # BLD AUTO: 0.81 THOUSANDS/ÂΜL (ref 0.6–4.47)
LYMPHOCYTES NFR BLD AUTO: 7 % (ref 14–44)
MCH RBC QN AUTO: 33.1 PG (ref 26.8–34.3)
MCHC RBC AUTO-ENTMCNC: 32.4 G/DL (ref 31.4–37.4)
MCV RBC AUTO: 102 FL (ref 82–98)
MONOCYTES # BLD AUTO: 0.43 THOUSAND/ÂΜL (ref 0.17–1.22)
MONOCYTES NFR BLD AUTO: 4 % (ref 4–12)
MV E'TISSUE VEL-SEP: 12 CM/S
NEUTROPHILS # BLD AUTO: 10.3 THOUSANDS/ÂΜL (ref 1.85–7.62)
NEUTS SEG NFR BLD AUTO: 88 % (ref 43–75)
NRBC BLD AUTO-RTO: 0 /100 WBCS
PLATELET # BLD AUTO: 308 THOUSANDS/UL (ref 149–390)
PMV BLD AUTO: 10 FL (ref 8.9–12.7)
POTASSIUM SERPL-SCNC: 4 MMOL/L (ref 3.5–5.3)
RBC # BLD AUTO: 3.69 MILLION/UL (ref 3.81–5.12)
RIGHT ATRIAL 2D VOLUME: 31 ML
RIGHT ATRIUM AREA SYSTOLE A4C: 13.1 CM2
RIGHT VENTRICLE ID DIMENSION: 3.8 CM
SL CV LEFT ATRIUM LENGTH A2C: 5.1 CM
SL CV PED ECHO LEFT VENTRICLE DIASTOLIC VOLUME (MOD BIPLANE) 2D: 94 ML
SL CV PED ECHO LEFT VENTRICLE SYSTOLIC VOLUME (MOD BIPLANE) 2D: 34 ML
SODIUM SERPL-SCNC: 140 MMOL/L (ref 135–147)
WBC # BLD AUTO: 11.66 THOUSAND/UL (ref 4.31–10.16)

## 2022-09-27 RX ORDER — LORAZEPAM 2 MG/ML
0.5 INJECTION INTRAMUSCULAR EVERY 6 HOURS PRN
Status: DISCONTINUED | OUTPATIENT
Start: 2022-09-27 | End: 2022-09-29

## 2022-09-27 RX ORDER — CEFAZOLIN SODIUM 1 G/50ML
1000 SOLUTION INTRAVENOUS EVERY 24 HOURS
Status: DISCONTINUED | OUTPATIENT
Start: 2022-09-27 | End: 2022-10-03

## 2022-09-27 RX ADMIN — CEFAZOLIN SODIUM 1000 MG: 1 SOLUTION INTRAVENOUS at 18:43

## 2022-09-27 RX ADMIN — Medication 1 PATCH: at 09:07

## 2022-09-27 RX ADMIN — THIAMINE HYDROCHLORIDE 500 MG: 100 INJECTION, SOLUTION INTRAMUSCULAR; INTRAVENOUS at 09:10

## 2022-09-27 RX ADMIN — VANCOMYCIN HYDROCHLORIDE 1250 MG: 1 INJECTION, POWDER, LYOPHILIZED, FOR SOLUTION INTRAVENOUS at 11:16

## 2022-09-27 RX ADMIN — SODIUM CHLORIDE, SODIUM GLUCONATE, SODIUM ACETATE, POTASSIUM CHLORIDE AND MAGNESIUM CHLORIDE 125 ML/HR: 526; 502; 368; 37; 30 INJECTION, SOLUTION INTRAVENOUS at 21:59

## 2022-09-27 RX ADMIN — QUETIAPINE FUMARATE 25 MG: 25 TABLET ORAL at 21:13

## 2022-09-27 RX ADMIN — SODIUM CHLORIDE, SODIUM GLUCONATE, SODIUM ACETATE, POTASSIUM CHLORIDE AND MAGNESIUM CHLORIDE 125 ML/HR: 526; 502; 368; 37; 30 INJECTION, SOLUTION INTRAVENOUS at 11:13

## 2022-09-27 RX ADMIN — THIAMINE HYDROCHLORIDE 500 MG: 100 INJECTION, SOLUTION INTRAMUSCULAR; INTRAVENOUS at 15:33

## 2022-09-27 RX ADMIN — SODIUM CHLORIDE, SODIUM GLUCONATE, SODIUM ACETATE, POTASSIUM CHLORIDE AND MAGNESIUM CHLORIDE 125 ML/HR: 526; 502; 368; 37; 30 INJECTION, SOLUTION INTRAVENOUS at 02:40

## 2022-09-27 RX ADMIN — HYDROMORPHONE HYDROCHLORIDE 0.5 MG: 1 INJECTION, SOLUTION INTRAMUSCULAR; INTRAVENOUS; SUBCUTANEOUS at 11:16

## 2022-09-27 RX ADMIN — HEPARIN SODIUM 5000 UNITS: 5000 INJECTION INTRAVENOUS; SUBCUTANEOUS at 06:01

## 2022-09-27 RX ADMIN — THIAMINE HYDROCHLORIDE 500 MG: 100 INJECTION, SOLUTION INTRAMUSCULAR; INTRAVENOUS at 21:13

## 2022-09-27 RX ADMIN — HEPARIN SODIUM 5000 UNITS: 5000 INJECTION INTRAVENOUS; SUBCUTANEOUS at 21:13

## 2022-09-27 RX ADMIN — HEPARIN SODIUM 5000 UNITS: 5000 INJECTION INTRAVENOUS; SUBCUTANEOUS at 14:15

## 2022-09-27 RX ADMIN — LORAZEPAM 0.5 MG: 2 INJECTION INTRAMUSCULAR; INTRAVENOUS at 14:08

## 2022-09-27 RX ADMIN — VENLAFAXINE HYDROCHLORIDE 150 MG: 150 CAPSULE, EXTENDED RELEASE ORAL at 09:16

## 2022-09-27 RX ADMIN — LORAZEPAM 0.5 MG: 2 INJECTION INTRAMUSCULAR; INTRAVENOUS at 21:14

## 2022-09-27 NOTE — CONSULTS
Consultation - [facility] Infectious Disease   Nimco Hilton 45 y o  female MRN: [id number]  Unit/Bed#: [place] [address] -01 Encounter: [id number]      IMPRESSION & RECOMMENDATIONS:   1  Sepsis, not present on admission, a/e/b fever, tachycardia  Mild leukocytosis  Suspect this is likely due to Gram-positive bacteremia  T-max 100 9° F  No lactic acidosis  COVID-19 negative  UA benign  Procalcitonin mildly elevated 0 31  Status post lumbar puncture for altered mental status with negative ME panel    -antibiotics as below   -follow-up cultures and adjust antibiotics as needed  -monitor temperature and hemodynamics  -recheck CBC and BMP in a m  2  Gram positive bacteremia  2 of 2 admission blood cultures postive for gram-positive cocci in clusters, likely Staphylococcus aureus per [facility] ID report  Patient was found down for prolonged period of time with evidence of multiple wounds on admission  Suspect cutaneous source, possibly phlebitis as there is report of RUE erythema surrounding prior IV    -discontinue ceftriaxone  -start IV cefazolin renally dosed 1 g Q 24  -repeat blood cultures tomorrow x2 sets  -check echocardiogram     3  Toxic Metabolic encephalopathy  Likely multifactorial etiology with uremia playing a role  This was thought to be secondary to gabapentin and morphine use in the setting of renal failure, hypotension, rhabdomyolysis  MRI brain negative  Lumbar puncture with opening pressure 26 with negative ME panel with CSF fluid culture not collected  4  Acute renal failure  Creatinine on admission 5 88 with CK over 32,000  Creatinine peaked at 7 6, now down trending to 7 0 today  Likely multifactorial etiology in the setting of rhabdomyolysis and dehydration  Likely ANUJA/ATN  Estimated Creatinine Clearance: 8 5 mL/min (A) (by C-G formula based on SCr of 7 08 mg/dL (H))  -renally dose antibiotics as needed  -close Nephrology follow-up  -recheck BMP daily    5  Transaminitis    This is likely secondary to rhabdo  LFTs and liver enzymes down trending  No signs of liver damage on imaging  Ammonia level within normal limits  Acute hepatitis panel is negative  6  Right lower extremity swelling and pain  Venous duplex negative  MRI ordered  Ortho following  7  Tobacco abuse  Encourage cessation as recommended by primary team     8  Antibiotic allergy  Hx of hives with PCNs  Tolerated ceftriaxone yesterday  Monitor for MUSA  Antibiotics:  Day 1 ancef     I have discussed the above management plan in detail with patient  I have discussed the above management plan in detail with patient's RN, and the primary service, [facility]  Extensive review of the medical records in epic including review of the notes, radiographs, and laboratory results     HISTORY OF PRESENT ILLNESS:  Reason for Consult:  Bacteremia    HPI: Caryle Harsh is a 45y o  year old female with past medical history significant for depression, tobacco abuse, degenerative disc disease, who initially presented to [facility] on 09/19 with altered mental status and ambulatory dysfunction  Patient has been under the care of outpatient pain management and was recently switched to morphine IR in anticipation for spine surgery in the next month  Patient had progressive lethargy with decreased oral intake for a few weeks leading up to admission and complained of right leg pain after recent fall  While in the ER patient was found to have acute renal failure in addition to transaminitis  Gastroenterology was consulted and felt that AST of 1683 and  were likely secondary to rhabdomyolysis with a very elevated CK  In addition, she was found to have hyperkalemia and hyponatremia nephrology recommended IV fluids with isotonic saline  CT chest revealed left-sided opacities in COVID-19 was negative with a minimally elevated procalcitonin in the setting of renal failure    Despite improvements in patient's metabolic derangements, she continued to demonstrate labile moods and persistent confusion with extrapyramidal movements and Neurology was consulted  Routine EEG, LP and MRI brain were completed  Ortho was also consulted in regards to right lower extremity swelling and pain and there was no evidence of compartment syndrome and lower extremity venous duplex negative   is at bedside and provides history is patient remains encephalopathic  He denies prior history of bloodstream infection  Denies history of MRSA  States she always has dry cracked skin  No history of prosthetic or intravascular devices  REVIEW OF SYSTEMS:  A complete review of systems is negative other than that noted in the HPI  PAST MEDICAL HISTORY:  Past Medical History:   Diagnosis Date   • Chronic pain disorder    • Depression    • GERD (gastroesophageal reflux disease)    • History of electroconvulsive therapy    • Low back pain    • Self-injurious behavior    • Suicide attempt [facility])      Past Surgical History:   Procedure Laterality Date   •  SECTION     • COLONOSCOPY     • PANCREAS SURGERY      "pseudocysts" per patient's  Ricki Infante   • OR ESOPHAGOGASTRODUODENOSCOPY TRANSORAL DIAGNOSTIC N/A 4/10/2018    Procedure: EGD AND COLONOSCOPY;  Surgeon: Karrie Madden MD;  [place] [facility]  GI LAB;   Service: Gastroenterology       FAMILY HISTORY:  Non-contributory    SOCIAL HISTORY:  Social History   Social History     Substance and Sexual Activity   Alcohol Use Yes    Comment: "occasional glass of wine"     Social History     Substance and Sexual Activity   Drug Use Not Currently   • Types: Marijuana, Cocaine    Comment: medical     Social History     Tobacco Use   Smoking Status Current Every Day Smoker   • Packs/day: 0 50   • Years: 35 00   • Pack years: 17 50   • Types: Cigarettes   Smokeless Tobacco Never Used       ALLERGIES:  Allergies   Allergen Reactions   • Chantix [Varenicline]    • Ibuprofen Other (See Comments) Upset stomach   • Lyrica [Pregabalin] Other (See Comments)     bruising   • Penicillins Other (See Comments)     ? hives   • Sulfa Antibiotics Other (See Comments)     sloughing skin in mouth   • Sulfasalazine        MEDICATIONS:  All current active medications have been reviewed  PHYSICAL EXAM:  Temp:  [98 8 °F (37 1 °C)-100 9 °F (38 3 °C)] 100 9 °F (38 3 °C)  HR:  [105-124] 119  Resp:  [18-22] 18  BP: (136-177)/() 145/87  SpO2:  [96 %-99 %] 96 %  Temp (24hrs), Av 9 °F (37 7 °C), Min:98 8 °F (37 1 °C), Max:100 9 °F (38 3 °C)  Current: Temperature: (!) 100 9 °F (38 3 °C)    Intake/Output Summary (Last 24 hours) at 2022 1307  Last data filed at 2022 1113  Gross per 24 hour   Intake 970 ml   Output --   Net 970 ml       General Appearance:  Appearing generally ill appearing in distress    HEENT: Normocephalic, without obvious abnormality  Conjunctiva pink and sclera anicteric  Oropharynx moist without lesions  Poor dentition  Lungs:   Clear to auscultation bilaterally, respirations unlabored   Heart:  RRR; no murmur, rub or gallop   Abdomen:   Soft, non-tender, non-distended, positive bowel sounds    Extremities: No cyanosis, clubbing  Right LE non pitting edema  Musculoskeletal: Back symmetric without curvature, ROM normal     : No CVA or suprapubic tenderness  No abel  Neuro: Writhing around in bed, encephalopathic, unable to participate in history or exam    Skin: Bilateral upper extremity ecchymoses of varying stages of healing  Right upper extremity IV appears intact without warmth, erythema, or exudate  Multiple skin wound noted to right knee, right madibular regiona and UE  LABS, IMAGING, & OTHER STUDIES:  Lab Results:  I have personally reviewed pertinent labs    Results from last 7 days   Lab Units 22  0557 22  0452 22  0548   WBC Thousand/uL 11 66* 11 15* 7 44   HEMOGLOBIN g/dL 12 2 12 5 11 1*   PLATELETS Thousands/uL 308 310 297     Results from last 7 days   Lab Units 09/27/22  0557 09/26/22  0454 09/25/22  0551 09/24/22  0548 09/23/22  0824 09/23/22  0530   SODIUM mmol/L 140 142 143 140   < > 138   POTASSIUM mmol/L 4 0 3 6 4 3 4 6   < > 4 7   CHLORIDE mmol/L 100 104 107 108   < > 104   CO2 mmol/L 24 22 20* 18*   < > 20*   BUN mg/dL 50* 53* 51* 52*   < > 52*   CREATININE mg/dL 7 08* 7 63* 7 67* 7 55*   < > 7 15*   EGFR ml/min/1 73sq m 6 5 5 5   < > 5   CALCIUM mg/dL 8 8 9 1 8 4 8 2*   < > 8 1*   AST U/L  --   --  82* 111*  --  167*   ALT U/L  --   --  215* 240*  --  292*   ALK PHOS U/L  --   --  67 67  --  69    < > = values in this interval not displayed  Results from last 7 days   Lab Units 09/26/22  1818 09/23/22  1608   GRAM STAIN RESULT  Gram positive cocci in clusters*  Gram positive cocci in clusters* No Polys or Bacteria seen                       Imaging Studies:   I have personally reviewed pertinent imaging study reports and images in PACS  Other Studies:   I have personally reviewed pertinent reports

## 2022-09-27 NOTE — PROGRESS NOTES
NEPHROLOGY PROGRESS NOTE   Gretchen Simmons 45 y o  female MRN: [id number]  Unit/Bed#: [place] 2 -01 Encounter: [id number]  Reason for Consult: ANUJA (POA)    78-year-old female with history of depression, GERD, tobacco abuse, who presents with change in mental status  Per outpatient record, patient suffered a fall on Friday  She became increasingly lethargic and sedentary over the weekend with decreased oral intake and worsening right leg pain  Nephrology consulted for acute kidney injury  ASSESSMENT/PLAN:  Acute kidney injury (POA):  Suspected ATN in the setting of rhabdomyolysis  -baseline creatinine less than 1 0   -presented with creatinine of 5 88   -creatinine appears to have plateaued and is improved today to 7 1   -will continue on IV fluids  -S/P IV lasix 09/25 & 09/26   -UA:  Large blood, 0-1 RBCs, trace protein   -renal imaging with mild nonspecific bilateral perinephric fat stranding, negative for hydro   -avoid nephrotoxins, hypotension, IV contrast   -strict I/O, patient pulled out to Rivera catheters  Not appropriate for pure [place]  Nursing reports continued episodes of incontinence  Unable to utilize brief as patient is pulling this off   -consent obtained for dialysis and in patient's chart  No emergent need for HD today      Traumatic rhabdo: With fall last Friday  No evidence of compartment syndrome  -CPK level trending downward  Initially greater than 32,000, most recently 990    -will repeat level   -continue with IV hydration   -continues to have poor oral intake      Anion gap Metabolic acidosis: In the setting of acute kidney injury and receiving large amounts of normal saline   -previously on IV bicarbonate  Change to isolate  Will continue to monitor and trend      Acute encephalopathy:  Suspected due to gabapentin and morphine as well as possible renal failure contributing  Continues to be confused and agitated    Concern for uremic encephalopathy, BUN slightly improved to 48   -neurology team following  Patient declined EEG  LP negative  MRI ordered      Transaminitis:  Likely due to rhabdo  -GI team following  No evidence of liver injury on CT imaging   -continues with fluid resuscitation      Right lower extremity edema:  No evidence of compartment syndrome at this time   -lower extremity duplex negative for DVT  -trending CPK levels  -orthopedic team following  Staph aureus bacteremia:  Currently on vancomycin per primary care team   Pharmacy for renal dosing      Other:  GERD, tobacco abuse, depression  Disposition:  Requiring additional stay due to medical needs  SUBJECTIVE:  The patient is resting in her bed  She continues to be confused and agitated  She has just received some pain medication so she is resting quite comfortably  She does not answer my questions      OBJECTIVE:  Current Weight: Weight - Scale: 67 9 kg (149 lb 11 1 oz)  Vitals:    09/26/22 2246 09/26/22 2300 09/27/22 0600 09/27/22 0716   BP: 136/84 136/84  145/87   Pulse: (!) 116 (!) 111  (!) 119   Resp: 22   18   Temp:    (!) 100 9 °F (38 3 °C)   TempSrc:       SpO2: 96%   96%   Weight:   67 9 kg (149 lb 11 1 oz)    Height:         No intake or output data in the 24 hours ending 09/27/22 1105  General:  Agitated  Skin: warm, dry, generalized ecchymotic areas and scabs  HEENT:  Dry mucous membranes, sclera anicteric, normocephalic, atraumatic  Neck: No apparent JVD appreciated  Chest: lung sounds clear B/L, on RA   CVS:Regular rate and rhythm, no murmer   Abdomen: Soft, round, non-tender, +BS  Extremities:  Right lower extremity edema  Neuro: alert   Psych:  Agitated    Medications:    Current Facility-Administered Medications:   •  acetaminophen (TYLENOL) tablet 650 mg, 650 mg, Oral, Q6H PRN, Atul Adan DO, 650 mg at 09/22/22 1040  •  heparin (porcine) subcutaneous injection 5,000 Units, 5,000 Units, Subcutaneous, Q8H [facility] & longterm, Atul Adan DO, 5,000 Units at 09/27/22 0601  •  HYDROmorphone (DILAUDID) injection 0 5 mg, 0 5 mg, Intravenous, Q4H PRN, Atul Adan DO, 0 5 mg at 09/25/22 0230  •  LORazepam (ATIVAN) injection 1 mg, 1 mg, Intravenous, Once, Atul Adan DO  •  multi-electrolyte (PLASMALYTE-A/ISOLYTE-S PH 7 4) IV solution, 125 mL/hr, Intravenous, Continuous, CARLITOS Murillo, Last Rate: 125 mL/hr at 09/27/22 0240, 125 mL/hr at 09/27/22 0240  •  nicotine (NICODERM CQ) 14 mg/24hr TD 24 hr patch 1 patch, 1 patch, Transdermal, Daily, Atul Adan DO, 1 patch at 09/27/22 0907  •  ondansetron (ZOFRAN) injection 4 mg, 4 mg, Intravenous, Q4H PRN, Raudel Palomo DO  •  QUEtiapine (SEROquel) tablet 25 mg, 25 mg, Oral, HS, Atul Adan DO, 25 mg at 09/26/22 2221  •  thiamine (VITAMIN B1) 500 mg in sodium chloride 0 9 % 50 mL IVPB, 500 mg, Intravenous, TID, Chato Manzanares DO, Last Rate: 100 mL/hr at 09/27/22 0910, 500 mg at 09/27/22 0910  •  vancomycin (VANCOCIN) 1,250 mg in sodium chloride 0 9 % 250 mL IVPB, 1,250 mg, Intravenous, Once, Sam Alas DO  •  venlafaxine [facility] ) 24 hr capsule 150 mg, 150 mg, Oral, Daily, Atul Adan DO, 150 mg at 09/27/22 9631    Laboratory Results:  Results from last 7 days   Lab Units 09/27/22  0557 09/26/22  0454 09/26/22  0452 09/25/22  0551 09/24/22  0548 09/23/22  0824 09/23/22  0530 09/21/22  0905 09/21/22  0550   WBC Thousand/uL 11 66*  --  11 15*  --  7 44  --  7 64  --  5 97   HEMOGLOBIN g/dL 12 2  --  12 5  --  11 1*  --  11 5  --  12 2   HEMATOCRIT % 37 6  --  39 0  --  35 4  --  36 2  --  37 7   PLATELETS Thousands/uL 308  --  310  --  297  --  312  --  329   SODIUM mmol/L 140 142  --  143 140   < > 138   < >  --    POTASSIUM mmol/L 4 0 3 6  --  4 3 4 6   < > 4 7   < >  --    CHLORIDE mmol/L 100 104  --  107 108   < > 104   < >  --    CO2 mmol/L 24 22  --  20* 18*   < > 20*   < >  --    BUN mg/dL 50* 53*  --  51* 52*   < > 52*   < >  --    CREATININE mg/dL 7 08* 7 63*  --  7 67* 7 55*   < > 7 15*   < >  --    CALCIUM mg/dL 8 8 9 1 --  8 4 8 2*   < > 8 1*   < >  --    PHOSPHORUS mg/dL  --   --   --   --   --   --   --   --  7 2*   ALK PHOS U/L  --   --   --  67 67  --  69  --  73   ALT U/L  --   --   --  215* 240*  --  292*  --  407*   AST U/L  --   --   --  82* 111*  --  167*  --  489*    < > = values in this interval not displayed

## 2022-09-27 NOTE — SPEECH THERAPY NOTE
Speech Language/Pathology    Speech/Language Pathology Progress Note    Patient Name: Chante Small  Today's Date: 2022     Problem List  Principal Problem:    Encephalopathy acute  Active Problems:    Depression    Tobacco abuse    DDD (degenerative disc disease), lumbosacral    ARF (acute renal failure) (HCC)    Transaminitis    Abnormal CT of the chest    Traumatic rhabdomyolysis (HCC)    D-dimer, elevated    Leg edema, right    Localized swelling of right upper extremity       Past Medical History  Past Medical History:   Diagnosis Date   • Chronic pain disorder    • Depression    • GERD (gastroesophageal reflux disease)    • History of electroconvulsive therapy    • Low back pain    • Self-injurious behavior    • Suicide attempt [facility])         Past Surgical History  Past Surgical History:   Procedure Laterality Date   •  SECTION     • COLONOSCOPY     • PANCREAS SURGERY      "pseudocysts" per patient's  Ricki Infante   • [place] ESOPHAGOGASTRODUODENOSCOPY TRANSORAL DIAGNOSTIC N/A 4/10/2018    Procedure: EGD AND COLONOSCOPY;  Surgeon: Kusum Fofana MD;  [place] [facility]  GI LAB; Service: Gastroenterology         Subjective:  Pt alert but making moaning noises and moving around bed, scratching left foot, swaying head back and forth    Did not answer most of my ?'s  Objective: Attempted to see w/ po  Noted her cream of wheat and pudding were gone  Attempted liquids  Noted her supplement was not taken  Offered water  Refused but then was agreeable bit mouth was open and she made no attempt to suck on the straw  A small amt was piped in  Delayed swallow  Trialed the juice which she was agreeable to  She kept biting on the straw  Assessment:  Confusion persists  Reduced intake  Plan/Recommendations:  Continue iv fluids and full liquids as tolerated  Hopefully can advance  Full supervision, encourage intake  ? Psych consult

## 2022-09-27 NOTE — PROGRESS NOTES
[facility]  Progress Note - Rai Ray 1970, 45 y o  female MRN: [id number]  Unit/Bed#: [facility] 68 2 -01 Encounter: [id number]  Primary Care Provider: Calvin Adame MD   Date and time admitted to hospital: 9/19/2022  8:04 AM    * Encephalopathy acute  Assessment & Plan  · Acute encephalopathy secondary to gabapentin and morphine with subsequent renal failure, potentially component of psychiatric illness  · Will start on thiamine high-dose given potential concern for Wernicke's encephalopathy  · Holding both gabapentin and morphine since admission  · LP and MRI negative for acute pathology  · Found to be positive for methicillin sensitive Staph aureus, previous white blood cell count of within normal limits, and patient only recently became afebrile      Bacteremia due to methicillin susceptible Staphylococcus aureus (MSSA)  Assessment & Plan  Patient with methicillin sensitive Staph aureus bacteremia  TTE ordered for further characterization of valvular vegetation  Potentially may have been present on admission as the patient has multiple wounds on her legs from previous fall 4 days ago  In differential diagnosis includes septic thrombophlebitis  Given 1 dose of ceftriaxone and not transition to intravenous Ancef  Infectious Disease consultation placed      Localized swelling of right upper extremity  Assessment & Plan  Evidence of streaking of the right upper extremity, although difficult to determine given ecchymosis  Blood cultures positive for methicillin sensitive Staph aureus    Leg edema, right  Assessment & Plan  · Likely source of rhabdomyolysis  No evidence of compartment syndrome fracture or DVT on imaging  · 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  · Orthopedic consulted  Repeat duplex negative for DVT    MRI ordered for further characterization      D-dimer, elevated  Assessment & Plan  · Elevated D-dimer may be related to fall  · Lower extremity duplex negative for DVT  · Renal dysfunction cannot check CTA of chest but doubt PE  · Discontinued heparin infusion and transitioned to prophylaxis dose    Traumatic rhabdomyolysis [facility])  Assessment & Plan  · Secondary to fall last Friday  IV fluids per nephrology  · Patient had been on for for approximately 4 days prior to being brought to the emergency room for acute evaluation    Results from last 7 days   Lab Units 09/27/22  0557 09/26/22  0454 09/25/22  0551 09/24/22  0548 09/23/22  0530 09/22/22  0756 09/21/22  0550   CK TOTAL U/L 848* 990* 793* 905* 1,482* 2,468* 5,033*       Abnormal CT of the chest  Assessment & Plan  · Left-sided opacities noted  COVID negative  · Procalcitonin only minimally elevated without evidence of active infection          Transaminitis  Assessment & Plan  · Secondary to rhabdomyolysis  No evidence of liver injury on CT imaging  · Seen by GI  Hepatic duplex also negative  Results from last 7 days   Lab Units 09/25/22  0551 09/24/22  0548 09/23/22  0530 09/21/22  0550   AST U/L 82* 111* 167* 489*   ALT U/L 215* 240* 292* 407*   TOTAL BILIRUBIN mg/dL 0 42 0 41 0 40 0 30       ARF (acute renal failure) (HCC)  Assessment & Plan  · ANUJA/ATN secondary to rhabdomyolysis  No evidence of renal obstruction on imaging  · Appreciate nephrology evaluation  Awaiting renal recovery      · No emergent need for hemodialysis, consent obtained from family members  · Renal function marginally improved today    Results from last 7 days   Lab Units 09/27/22  0557 09/26/22  0454 09/25/22  0551 09/24/22  0548 09/23/22  0824 09/23/22  0530 09/23/22  0132 09/22/22  1656 09/22/22  0756   BUN mg/dL 50* 53* 51* 52* 54* 52* 54* 52* 51*   CREATININE mg/dL 7 08* 7 63* 7 67* 7 55* 6 98* 7 15* 7 15* 7 10* 6 81*   EGFR ml/min/1 73sq m 6 5 5 5 6 5 5 5 6       DDD (degenerative disc disease), lumbosacral  Assessment & Plan  · Lumbar radiculopathy due for surgery  · Prior to admission was on gabapentin and morphine IR 15 mg   · Confirmed on   Holding both for now given kidney injury  · Monitor for gabapentin withdrawal    Tobacco abuse  Assessment & Plan  · Nicotine patch ordered    Depression  Assessment & Plan  · Continue venlafaxine and quetiapine  · Does have history of suicide attempt a currently does not show any signs of thoughts of self-harm  · Potentially a psychiatric component of the patient's current encephalopathy      [facility] Internal Medicine Progress Note  Patient: Jonathan Sanford 45 y o  female   MRN: [id number]  PCP: Fartun Hough MD  Unit/Bed[facility] 2 -01 Encounter: [id number]  Date Of Visit: 09/27/22    Assessment:    Principal Problem:    Encephalopathy acute  Active Problems:    Depression    Tobacco abuse    DDD (degenerative disc disease), lumbosacral    ARF (acute renal failure) (HCC)    Transaminitis    Abnormal CT of the chest    Traumatic rhabdomyolysis (HCC)    D-dimer, elevated    Leg edema, right    Localized swelling of right upper extremity    Bacteremia due to methicillin susceptible Staphylococcus aureus (MSSA)      Plan:    · Continue current medical management  · Infectious Disease consultation  · Start IV Ancef  · Repeat labs in a m  VTE Pharmacologic Prophylaxis:   Pharmacologic: Heparin  Mechanical VTE Prophylaxis in Place: Yes    Patient Centered Rounds: I have performed bedside rounds with nursing staff today  Discussions with Specialists or Other Care Team Provider:  Discussed with case management    Education and Discussions with Family / Patient:  Patient     Time Spent for Care: 45 minutes  More than 50% of total time spent on counseling and coordination of care as described above      Current Length of Stay: 8 day(s)    Current Patient Status: Inpatient   Certification Statement: The patient will continue to require additional inpatient hospital stay due to Bacteremia    Discharge Plan / Estimated Discharge Date:  Greater than 72 hours    Code Status: Level 1 - Full Code      Subjective:   Seen examined, patient still significantly confused  Alert oriented to person    A complete and comprehensive 14 point organ system review has been performed and all other systems are negative other than stated above  Objective:     Vitals:   Temp (24hrs), Av 5 °F (37 5 °C), Min:98 °F (36 7 °C), Max:100 9 °F (38 3 °C)    Temp:  [98 °F (36 7 °C)-100 9 °F (38 3 °C)] 98 °F (36 7 °C)  HR:  [111-119] 112  Resp:  [18-22] 20  BP: (136-169)/(82-87) 146/85  SpO2:  [91 %-99 %] 91 %  Body mass index is 24 05 kg/m²  Input and Output Summary (last 24 hours):        Intake/Output Summary (Last 24 hours) at 2022 1624  Last data filed at 2022 1113  Gross per 24 hour   Intake 970 ml   Output --   Net 970 ml       Physical Exam:     General: well appearing, no acute distress  HEENT: atraumatic, PERRLA, moist mucosa, normal pharynx, normal tonsils and adenoids, normal tongue, no fluid in sinuses  Neck: Trachea midline, no carotid bruit, no masses  Respiratory: normal chest wall expansion, CTA B, no r/r/w, no rubs  Cardiovascular: RRR, no m/r/g, Normal S1 and S2  Abdomen: Soft, non-tender, non-distended, normal bowel sounds in all quadrants, no hepatosplenomegaly, no tympany  Rectal: deferred  Musculoskeletal:  Moves all  Integumentary: warm, dry, and pink, with no rash, purpura, or petechia  Heme/Lymph: no lymphadenopathy, no bruises  Neurological:  Unable to accurately assess  Psychiatric:  Confused      Additional Data:     Labs:    Results from last 7 days   Lab Units 22  0557   WBC Thousand/uL 11 66*   HEMOGLOBIN g/dL 12 2   HEMATOCRIT % 37 6   PLATELETS Thousands/uL 308   NEUTROS PCT % 88*   LYMPHS PCT % 7*   MONOS PCT % 4   EOS PCT % 0     Results from last 7 days   Lab Units 22  0557 22  0454 22  0551   POTASSIUM mmol/L 4 0   < > 4 3   CHLORIDE mmol/L 100   < > 107   CO2 mmol/L 24   < > 20*   BUN mg/dL 50*   < > 51* CREATININE mg/dL 7 08*   < > 7 67*   CALCIUM mg/dL 8 8   < > 8 4   ALK PHOS U/L  --   --  67   ALT U/L  --   --  215*   AST U/L  --   --  82*    < > = values in this interval not displayed  Results from last 7 days   Lab Units 09/23/22  0530   INR  1 03       * I Have Reviewed All Lab Data Listed Above  * Additional Pertinent Lab Tests Reviewed: Matilde 66 Admission Reviewed    Imaging:    Imaging Reports Reviewed Today Include:  No new imaging  Imaging Personally Reviewed by Myself Includes:  No new imaging    Recent Cultures (last 7 days):     Results from last 7 days   Lab Units 09/26/22  1818 09/23/22  1608   GRAM STAIN RESULT  Gram positive cocci in clusters*  Gram positive cocci in clusters* No Polys or Bacteria seen       Last 24 Hours Medication List:   Current Facility-Administered Medications   Medication Dose Route Frequency Provider Last Rate   • acetaminophen  650 mg Oral Q6H PRN Duey Riding, DO     • cefazolin  1,000 mg Intravenous Q24H Álvaro Knox PA-C     • heparin (porcine)  5,000 Units Subcutaneous Q8H [facility] Atul Adan DO     • HYDROmorphone  0 5 mg Intravenous Q4H PRN Duey Riding, DO     • LORazepam  0 5 mg Intravenous Q6H PRN Sam Mcdaniel DO     • LORazepam  1 mg Intravenous Once Duey Riding, DO     • multi-electrolyte  125 mL/hr Intravenous Continuous Humera Johnson CRNP 125 mL/hr (09/27/22 1113)   • nicotine  1 patch Transdermal Daily Atul Adan DO     • ondansetron  4 mg Intravenous Q4H PRN Duey Riding, DO     • QUEtiapine  25 mg Oral HS Atul Adan DO     • thiamine  500 mg Intravenous TID Karla Person  mg (09/27/22 1533)   • venlafaxine  150 mg Oral Daily Duey Riding, DO          Today, Patient Was Seen By: Karla Person DO    ** Please Note: This note was completed in part utilizing [employer] Direct Software    Grammatical errors, random word insertions, spelling mistakes, and incomplete sentences may be an occasional consequence of this system secondary to software limitations, ambient noise, and hardware issues  If you have any questions or concerns about the content, text, or information contained within the body of this dictation, please contact the provider for clarification   **

## 2022-09-27 NOTE — ASSESSMENT & PLAN NOTE
· Acute encephalopathy secondary to gabapentin and morphine with subsequent renal failure, potentially component of psychiatric illness  · Will start on thiamine high-dose given potential concern for Wernicke's encephalopathy  · Holding both gabapentin and morphine since admission  · LP and MRI negative for acute pathology  · Found to be positive for methicillin sensitive Staph aureus, previous white blood cell count of within normal limits, and patient only recently became afebrile

## 2022-09-27 NOTE — PHYSICAL THERAPY NOTE
PHYSICAL THERAPY NOTE          Patient Name: Belkis Harrell  LLFOP'G Date: 9/27/2022  Time: 7305-8671       09/27/22 1015   PT Last Visit   PT Visit Date 09/27/22   Note Type   Note Type Treatment   Pain Assessment   Pain Assessment Tool FLACC   Pain Rating: FLACC (Rest) - Face 0   Pain Rating: FLACC (Rest) - Legs 1   Pain Rating: FLACC (Rest) - Activity 1   Pain Rating: FLACC (Rest) - Cry 1   Pain Rating: FLACC (Rest) - Consolability 0   Score: FLACC (Rest) 3   Pain Rating: FLACC (Activity) - Face 0   Pain Rating: FLACC (Activity) - Legs 1   Pain Rating: FLACC (Activity) - Activity 1   Pain Rating: FLACC (Activity) - Cry 1   Pain Rating: FLACC (Activity) - Consolability 0   Score: FLACC (Activity) 3   Restrictions/Precautions   Other Precautions Cognitive; Chair Alarm; Bed Alarm;Multiple lines; Fall Risk   General   Chart Reviewed Yes   Response to Previous Treatment Patient unable to report, no changes reported from family or staff   Cognition   Overall Cognitive Status Impaired   Arousal/Participation Alert   Attention Difficulty attending to directions   Orientation Level Unable to assess;Oriented to person  (responds to name)   Memory Unable to assess   Following Commands Follows one step commands inconsistently   Comments constantly writhing, moaning and yelling out  does demonstrate purposeful movements such as pulling cover ups, picking toes, performing bed level transf when asked  does not make eye contact/ rarely opens eyes  inconsistently follows commands and answers questions  Subjective   Subjective "ahhhhhhhhhhhh"   Bed Mobility   Supine to Sit 5  Supervision   Additional items HOB elevated; Bedrails   Sit to Supine 5  Supervision   Additional items HOB elevated   Additional Comments perform long sit in bed     Activity Tolerance   Activity Tolerance Treatment limited secondary to medical complications (Comment)  (cognition)   Medical Staff Made Aware ST   Nurse Made Aware Mardee Kayser RN   Assessment   Prognosis Guarded   Problem List Decreased mobility; Decreased cognition;Decreased skin integrity;Pain   Assessment Antonino Dubois was seen for a f/u session  Pt continues to present w significant cognitive deficits impacting progress and participation in therapy  She has no observed difficulty performing bed level transf however given current mentation unable to progress to OOB mobility or participate in HEP  Will continue to update POC based on medical status (cognition) and ability to participate in therapy services  Barriers to Discharge Inaccessible home environment;Decreased caregiver support   Goals   Patient Goals none stated   STG Expiration Date 10/04/22   Short Term Goal #1 1)  Pt will perform bed mobility with S demonstrating appropriate technique 100% of the time in order to improve function  2)  Perform all transfers with S demonstrating safe and appropriate technique 100% of the time in order to improve ability to negotiate safely in home environment  3) Amb with least restrictive AD > 50'x1 with S in order to demonstrate ability to negotiate in home environment  4)  Improve overall strength and balance 1/2 grade in order to optimize ability to perform functional tasks and reduce fall risk  5) Increase activity tolerance to 45 minutes in order to improve endurance to functional tasks  6)  Negotiate stairs using most appropriate technique and min A in order to be able to negotiate safely in home environment  7) PT for ongoing patient and family/caregiver education, DME needs and d/c planning in order to promote highest level of function in least restrictive environment  PT Treatment Day 2   Plan   Treatment/Interventions Functional transfer training;LE strengthening/ROM; Elevations; Therapeutic exercise;Cognitive reorientation;Patient/family training;Equipment eval/education; Bed mobility;Gait training; Compensatory technique education;Continued evaluation;Spoke to nursing;ST   Progress No functional improvements   PT Frequency Other (Comment)  (4-5x)   Recommendation   PT Discharge Recommendation Post acute rehabilitation services   AM-PAC Basic Mobility Inpatient   Turning in Bed Without Bedrails 3   Lying on Back to Sitting on Edge of Flat Bed 3   Moving Bed to Chair 2   Standing Up From Chair 2   Walk in Room 2   Climb 3-5 Stairs 1   Basic Mobility Inpatient Raw Score 13   Basic Mobility Standardized Score 33 99   Highest Level Of Mobility   -HLM Goal 4: Move to chair/commode   -HL Achieved 2: Bed activities/Dependent transfer   Education   Patient Reinforcement needed   End of Consult   Patient Position at End of Consult Supine; All needs within reach;Bed/Chair alarm activated       Nalini Powell, PT

## 2022-09-27 NOTE — ASSESSMENT & PLAN NOTE
Evidence of streaking of the right upper extremity, although difficult to determine given ecchymosis  Blood cultures positive for methicillin sensitive Staph aureus

## 2022-09-27 NOTE — ASSESSMENT & PLAN NOTE
Patient with methicillin sensitive Staph aureus bacteremia  TTE ordered for further characterization of valvular vegetation  Potentially may have been present on admission as the patient has multiple wounds on her legs from previous fall 4 days ago  In differential diagnosis includes septic thrombophlebitis  Given 1 dose of ceftriaxone and not transition to intravenous Ancef  Infectious Disease consultation placed

## 2022-09-27 NOTE — PROGRESS NOTES
Vancomycin Assessment    Marino Pascual is a 45 y o  female who is currently receiving vancomycin 1250mg IV once for bacteremia     Relevant clinical data and objective history reviewed:  Creatinine   Date Value Ref Range Status   09/27/2022 7 08 (H) 0 60 - 1 30 mg/dL Final     Comment:     Standardized to IDMS reference method   09/26/2022 7 63 (H) 0 60 - 1 30 mg/dL Final     Comment:     Standardized to IDMS reference method   09/25/2022 7 67 (H) 0 60 - 1 30 mg/dL Final     Comment:     Standardized to IDMS reference method     /87   Pulse (!) 119   Temp (!) 100 9 °F (38 3 °C)   Resp 18   Ht 5' 6" (1 676 m)   Wt 67 9 kg (149 lb 11 1 oz)   LMP 03/14/2019 (Approximate)   SpO2 96%   BMI 24 16 kg/m²   No intake/output data recorded  Lab Results   Component Value Date/Time    BUN 50 (H) 09/27/2022 05:57 AM    WBC 11 66 (H) 09/27/2022 05:57 AM    HGB 12 2 09/27/2022 05:57 AM    HGB 16 7 (H) 03/11/2014 07:55 AM    HCT 37 6 09/27/2022 05:57 AM     (H) 09/27/2022 05:57 AM     09/27/2022 05:57 AM     Temp Readings from Last 3 Encounters:   09/27/22 (!) 100 9 °F (38 3 °C)   09/12/22 99 8 °F (37 7 °C) (Tympanic)   07/25/22 99 7 °F (37 6 °C) (Tympanic)     Vancomycin Days of Therapy: 1    Assessment/Plan  The patient is currently on vancomycin utilizing pulse dosing based on actual body weight  Baseline risks associated with therapy include: pre-existing renal impairment and concomitant nephrotoxic medications  The patient is currently receiving 1250mg IV once and after clinical evaluation will be changed to Pulse dosing  Plan to check a random level on 9- @ 1100  Dose to be determined based on the level  Pharmacy will also follow closely for s/sx of nephrotoxicity, infusion reactions, and appropriateness of therapy  BMP and CBC will be ordered per protocol  Pharmacy will continue to follow the patient’s culture results and clinical progress daily      Rani Giles Pharmacist

## 2022-09-27 NOTE — ASSESSMENT & PLAN NOTE
· Secondary to fall last Friday  IV fluids per nephrology    · Patient had been on for for approximately 4 days prior to being brought to the emergency room for acute evaluation    Results from last 7 days   Lab Units 09/27/22  0557 09/26/22  0454 09/25/22  0551 09/24/22  0548 09/23/22  0530 09/22/22  0756 09/21/22  0550   CK TOTAL U/L 848* 990* 793* 905* 1,482* 2,468* 5,033*

## 2022-09-27 NOTE — ASSESSMENT & PLAN NOTE
· ANUJA/ATN secondary to rhabdomyolysis  No evidence of renal obstruction on imaging  · Appreciate nephrology evaluation  Awaiting renal recovery      · No emergent need for hemodialysis, consent obtained from family members  · Renal function marginally improved today    Results from last 7 days   Lab Units 09/27/22  0557 09/26/22  0454 09/25/22  0551 09/24/22  0548 09/23/22  0824 09/23/22  0530 09/23/22  0132 09/22/22  1656 09/22/22  0756   BUN mg/dL 50* 53* 51* 52* 54* 52* 54* 52* 51*   CREATININE mg/dL 7 08* 7 63* 7 67* 7 55* 6 98* 7 15* 7 15* 7 10* 6 81*   EGFR ml/min/1 73sq m 6 5 5 5 6 5 5 5 6

## 2022-09-27 NOTE — ASSESSMENT & PLAN NOTE
· Left-sided opacities noted  COVID negative    · Procalcitonin only minimally elevated without evidence of active infection

## 2022-09-28 LAB
ANION GAP SERPL CALCULATED.3IONS-SCNC: 12 MMOL/L (ref 4–13)
BASOPHILS # BLD AUTO: 0.07 THOUSANDS/ÂΜL (ref 0–0.1)
BASOPHILS NFR BLD AUTO: 1 % (ref 0–1)
BUN SERPL-MCNC: 48 MG/DL (ref 5–25)
CALCIUM SERPL-MCNC: 8.9 MG/DL (ref 8.3–10.1)
CHLORIDE SERPL-SCNC: 103 MMOL/L (ref 96–108)
CO2 SERPL-SCNC: 29 MMOL/L (ref 21–32)
CREAT SERPL-MCNC: 6.94 MG/DL (ref 0.6–1.3)
EOSINOPHIL # BLD AUTO: 0.09 THOUSAND/ÂΜL (ref 0–0.61)
EOSINOPHIL NFR BLD AUTO: 1 % (ref 0–6)
ERYTHROCYTE [DISTWIDTH] IN BLOOD BY AUTOMATED COUNT: 13.8 % (ref 11.6–15.1)
GFR SERPL CREATININE-BSD FRML MDRD: 6 ML/MIN/1.73SQ M
GLUCOSE SERPL-MCNC: 101 MG/DL (ref 65–140)
HCT VFR BLD AUTO: 36.2 % (ref 34.8–46.1)
HGB BLD-MCNC: 11.5 G/DL (ref 11.5–15.4)
IMM GRANULOCYTES # BLD AUTO: 0.08 THOUSAND/UL (ref 0–0.2)
IMM GRANULOCYTES NFR BLD AUTO: 1 % (ref 0–2)
LYMPHOCYTES # BLD AUTO: 1.06 THOUSANDS/ÂΜL (ref 0.6–4.47)
LYMPHOCYTES NFR BLD AUTO: 12 % (ref 14–44)
MCH RBC QN AUTO: 32.6 PG (ref 26.8–34.3)
MCHC RBC AUTO-ENTMCNC: 31.8 G/DL (ref 31.4–37.4)
MCV RBC AUTO: 103 FL (ref 82–98)
MONOCYTES # BLD AUTO: 0.58 THOUSAND/ÂΜL (ref 0.17–1.22)
MONOCYTES NFR BLD AUTO: 7 % (ref 4–12)
NEUTROPHILS # BLD AUTO: 6.7 THOUSANDS/ÂΜL (ref 1.85–7.62)
NEUTS SEG NFR BLD AUTO: 78 % (ref 43–75)
NRBC BLD AUTO-RTO: 0 /100 WBCS
PLATELET # BLD AUTO: 293 THOUSANDS/UL (ref 149–390)
PMV BLD AUTO: 10.4 FL (ref 8.9–12.7)
POTASSIUM SERPL-SCNC: 3.3 MMOL/L (ref 3.5–5.3)
RBC # BLD AUTO: 3.53 MILLION/UL (ref 3.81–5.12)
SODIUM SERPL-SCNC: 144 MMOL/L (ref 135–147)
VANCOMYCIN SERPL-MCNC: 14.5 UG/ML (ref 10–20)
WBC # BLD AUTO: 8.58 THOUSAND/UL (ref 4.31–10.16)

## 2022-09-28 RX ORDER — ALBUMIN (HUMAN) 12.5 G/50ML
25 SOLUTION INTRAVENOUS ONCE
Status: COMPLETED | OUTPATIENT
Start: 2022-09-28 | End: 2022-09-28

## 2022-09-28 RX ORDER — POTASSIUM CHLORIDE 14.9 MG/ML
20 INJECTION INTRAVENOUS
Status: COMPLETED | OUTPATIENT
Start: 2022-09-28 | End: 2022-09-28

## 2022-09-28 RX ORDER — POTASSIUM CHLORIDE 29.8 MG/ML
40 INJECTION INTRAVENOUS ONCE
Status: DISCONTINUED | OUTPATIENT
Start: 2022-09-28 | End: 2022-09-28

## 2022-09-28 RX ADMIN — QUETIAPINE FUMARATE 25 MG: 25 TABLET ORAL at 22:00

## 2022-09-28 RX ADMIN — POTASSIUM CHLORIDE 20 MEQ: 14.9 INJECTION, SOLUTION INTRAVENOUS at 12:13

## 2022-09-28 RX ADMIN — THIAMINE HYDROCHLORIDE 500 MG: 100 INJECTION, SOLUTION INTRAMUSCULAR; INTRAVENOUS at 22:03

## 2022-09-28 RX ADMIN — HEPARIN SODIUM 5000 UNITS: 5000 INJECTION INTRAVENOUS; SUBCUTANEOUS at 22:00

## 2022-09-28 RX ADMIN — POTASSIUM CHLORIDE 20 MEQ: 14.9 INJECTION, SOLUTION INTRAVENOUS at 10:10

## 2022-09-28 RX ADMIN — LORAZEPAM 0.5 MG: 2 INJECTION INTRAMUSCULAR; INTRAVENOUS at 03:42

## 2022-09-28 RX ADMIN — VENLAFAXINE HYDROCHLORIDE 150 MG: 150 CAPSULE, EXTENDED RELEASE ORAL at 08:23

## 2022-09-28 RX ADMIN — HEPARIN SODIUM 5000 UNITS: 5000 INJECTION INTRAVENOUS; SUBCUTANEOUS at 05:35

## 2022-09-28 RX ADMIN — CEFAZOLIN SODIUM 1000 MG: 1 SOLUTION INTRAVENOUS at 18:13

## 2022-09-28 RX ADMIN — ALBUMIN (HUMAN) 25 G: 0.25 INJECTION, SOLUTION INTRAVENOUS at 10:10

## 2022-09-28 RX ADMIN — HEPARIN SODIUM 5000 UNITS: 5000 INJECTION INTRAVENOUS; SUBCUTANEOUS at 14:13

## 2022-09-28 RX ADMIN — SODIUM CHLORIDE, SODIUM GLUCONATE, SODIUM ACETATE, POTASSIUM CHLORIDE AND MAGNESIUM CHLORIDE 100 ML/HR: 526; 502; 368; 37; 30 INJECTION, SOLUTION INTRAVENOUS at 17:05

## 2022-09-28 RX ADMIN — SODIUM CHLORIDE, SODIUM GLUCONATE, SODIUM ACETATE, POTASSIUM CHLORIDE AND MAGNESIUM CHLORIDE 125 ML/HR: 526; 502; 368; 37; 30 INJECTION, SOLUTION INTRAVENOUS at 05:36

## 2022-09-28 RX ADMIN — THIAMINE HYDROCHLORIDE 500 MG: 100 INJECTION, SOLUTION INTRAMUSCULAR; INTRAVENOUS at 08:24

## 2022-09-28 RX ADMIN — Medication 1 PATCH: at 08:24

## 2022-09-28 RX ADMIN — LORAZEPAM 0.5 MG: 2 INJECTION INTRAMUSCULAR; INTRAVENOUS at 18:13

## 2022-09-28 RX ADMIN — THIAMINE HYDROCHLORIDE 500 MG: 100 INJECTION, SOLUTION INTRAMUSCULAR; INTRAVENOUS at 15:06

## 2022-09-28 NOTE — PROGRESS NOTES
Progress Note - [facility]   Vianey June 45 y o  female MRN: [id number]  Unit/Bed#: [place] [address] -01 Encounter: [id number]      IMPRESSION & RECOMMENDATIONS:   1  SIRS vs Sepsis, not present on admission, a/e/b fever, tachycardia  Mild leukocytosis  Suspect this is likely due to Gram-positive bacteremia  T-max 100 9° F  No lactic acidosis  COVID-19 negative  UA benign  Procalcitonin mildly elevated 0 31  Status post lumbar puncture for altered mental status with negative ME panel    -antibiotics as below   -follow-up cultures and adjust antibiotics as needed  -monitor temperature and hemodynamics  -recheck CBC and BMP in a m      2  MSSA bacteremia  2 of 2 admission blood cultures postive for Staph aureus, likely MSSA per [facility] report  Patient was found down for prolonged period of time with evidence of multiple wounds on admission  Suspect cutaneous vs endovascular source, likely phlebitis as there is report of RUE erythema surrounding prior IV  TTE negative for obvious vegetaion   -continue IV cefazolin renally dosed 1 g Q 24  -follow up repeat blood cultures obtained today to confirm clearance of bacteremia      3  Toxic Metabolic encephalopathy  Likely multifactorial etiology with uremia playing a role  This was thought to be secondary to gabapentin and morphine use in the setting of renal failure, hypotension, rhabdomyolysis  MRI brain negative  Lumbar puncture with opening pressure 26 with negative ME panel with CSF fluid culture not collected       4  Acute renal failure  Creatinine on admission 5 88 with CK over 32,000  Creatinine reached plateau, now down trending 6 9 today  Likely multifactorial etiology in the setting of rhabdomyolysis and dehydration  Likely ANUJA/ATN  Estimated Creatinine Clearance: 8 7 mL/min (A) (by C-G formula based on SCr of 6 94 mg/dL (H))  -renally dose antibiotics as needed  -close Nephrology follow-up  -recheck BMP daily     5  Transaminitis  This is likely secondary to rhabdo  LFTs and liver enzymes down trending  No signs of liver damage on imaging  Ammonia level within normal limits  Acute hepatitis panel is negative      6  Right lower extremity swelling and pain  Venous duplex negative  MRI ordered  Ortho following      7  Tobacco abuse  Encourage cessation as recommended by primary team      8  Antibiotic allergy  Hx of hives with PCNs  Tolerated ceftriaxone yesterday  Monitor for MUSA  Antibiotics:  Day 2 IV cefazolin    I have discussed the above management plan in detail with patient  I have discussed the above management plan in detail with patient's RN, and the primary service, [facility]  Subjective:  Patient aphasic on exam but does shake her head yes and no and denies fever, chills, or nausea  Objective:  Vitals:  Temp:  [96 5 °F (35 8 °C)-99 7 °F (37 6 °C)] 97 2 °F (36 2 °C)  HR:  [110-124] 116  Resp:  [18-20] 18  BP: (146-155)/(78-94) 155/94  SpO2:  [88 %-94 %] 93 %  Temp (24hrs), Av 8 °F (36 6 °C), Min:96 5 °F (35 8 °C), Max:99 7 °F (37 6 °C)  Current: Temperature: (!) 97 2 °F (36 2 °C)    PHYSICAL EXAM:  General Appearance:  Appearing well, nontoxic, and in no distress   HEENT: Normocephalic, without obvious abnormality, atraumatic  Conjunctiva pink and sclera anicteric  Oropharynx moist without lesions  Poor dentition  Lungs:   Clear to auscultation bilaterally, respirations unlabored   Heart:  Tachycardia; no murmur, rub or gallop   Abdomen:   Soft, non-tender, non-distended, positive bowel sounds    Extremities: No cyanosis, clubbing or edema  RLE not pitting edema  Musculoskeletal: Back symmetric without curvature, ROM normal     : No CVA or suprapubic tenderness  No abel  Skin: No rashes or lesions  No draining wounds noted  B/l antecubs with palpable cord at prior IV sites  No pain with palpation  Scattered wound noted at various stages of healing  B/l UE ecchymoses noted   Peripheral IV intact without evidence of erythema, warmth, or exudate  LABS, IMAGING, & OTHER STUDIES:  Lab Results:  I have personally reviewed pertinent labs  Results from last 7 days   Lab Units 09/28/22  0542 09/27/22  0557 09/26/22  0452   WBC Thousand/uL 8 58 11 66* 11 15*   HEMOGLOBIN g/dL 11 5 12 2 12 5   PLATELETS Thousands/uL 293 308 310     Results from last 7 days   Lab Units 09/28/22  0542 09/27/22  0557 09/26/22  0454 09/25/22  0551 09/24/22  0548 09/23/22  0824 09/23/22  0530   SODIUM mmol/L 144 140 142 143 140   < > 138   POTASSIUM mmol/L 3 3* 4 0 3 6 4 3 4 6   < > 4 7   CHLORIDE mmol/L 103 100 104 107 108   < > 104   CO2 mmol/L 29 24 22 20* 18*   < > 20*   BUN mg/dL 48* 50* 53* 51* 52*   < > 52*   CREATININE mg/dL 6 94* 7 08* 7 63* 7 67* 7 55*   < > 7 15*   EGFR ml/min/1 73sq m 6 6 5 5 5   < > 5   CALCIUM mg/dL 8 9 8 8 9 1 8 4 8 2*   < > 8 1*   AST U/L  --   --   --  82* 111*  --  167*   ALT U/L  --   --   --  215* 240*  --  292*   ALK PHOS U/L  --   --   --  67 67  --  69    < > = values in this interval not displayed       Results from last 7 days   Lab Units 09/26/22  1818 09/23/22  1608   BLOOD CULTURE  Staphylococcus aureus*  Staphylococcus aureus*  --    GRAM STAIN RESULT  Gram positive cocci in clusters*  Gram positive cocci in clusters* No Polys or Bacteria seen

## 2022-09-28 NOTE — PLAN OF CARE
Problem: INFECTION - ADULT  Goal: Absence or prevention of progression during hospitalization  Description: INTERVENTIONS:  - Assess and monitor for signs and symptoms of infection  - Monitor lab/diagnostic results  - Monitor all insertion sites, i e  indwelling lines, tubes, and drains  - Monitor endotracheal if appropriate and nasal secretions for changes in amount and color  - Douglass appropriate cooling/warming therapies per order  - Administer medications as ordered  - Instruct and encourage patient and family to use good hand hygiene technique  - Identify and instruct in appropriate isolation precautions for identified infection/condition  Outcome: Progressing  Goal: Absence of fever/infection during neutropenic period  Description: INTERVENTIONS:  - Monitor WBC    Outcome: Progressing     Problem: SAFETY ADULT  Goal: Patient will remain free of falls  Description: INTERVENTIONS:  - Educate patient/family on patient safety including physical limitations  - Instruct patient to call for assistance with activity   - Consult OT/PT to assist with strengthening/mobility   - Keep Call bell within reach  - Keep bed low and locked with side rails adjusted as appropriate  - Keep care items and personal belongings within reach  - Initiate and maintain comfort rounds  - Make Fall Risk Sign visible to staff  - Offer Toileting every 2 Hours, in advance of need  - Initiate/Maintain   alarm  - Obtain necessary fall risk management equipment:     - Apply yellow socks and bracelet for high fall risk patients  - Consider moving patient to room near nurses station  Outcome: Progressing  Goal: Maintain or return to baseline ADL function  Description: INTERVENTIONS:  -  Assess patient's ability to carry out ADLs; assess patient's baseline for ADL function and identify physical deficits which impact ability to perform ADLs (bathing, care of mouth/teeth, toileting, grooming, dressing, etc )  - Assess/evaluate cause of self-care deficits   - Assess range of motion  - Assess patient's mobility; develop plan if impaired  - Assess patient's need for assistive devices and provide as appropriate  - Encourage maximum independence but intervene and supervise when necessary  - Involve family in performance of ADLs  - Assess for home care needs following discharge   - Consider OT consult to assist with ADL evaluation and planning for discharge  - Provide patient education as appropriate  Outcome: Progressing  Goal: Maintains/Returns to pre admission functional level  Description: INTERVENTIONS:  - Perform BMAT or MOVE assessment daily    - Set and communicate daily mobility goal to care team and patient/family/caregiver  - Collaborate with rehabilitation services on mobility goals if consulted  - Perform Range of Motion 3 times a day  - Reposition patient every 2 hours    - Dangle patient 3 times a day  - Stand patient 3 times a day  - Ambulate patient 3 times a day  - Out of bed to chair 3 times a day   - Out of bed for meals 3 times a day  - Out of bed for toileting  - Record patient progress and toleration of activity level   Outcome: Progressing     Problem: MOBILITY - ADULT  Goal: Maintain or return to baseline ADL function  Description: INTERVENTIONS:  -  Assess patient's ability to carry out ADLs; assess patient's baseline for ADL function and identify physical deficits which impact ability to perform ADLs (bathing, care of mouth/teeth, toileting, grooming, dressing, etc )  - Assess/evaluate cause of self-care deficits   - Assess range of motion  - Assess patient's mobility; develop plan if impaired  - Assess patient's need for assistive devices and provide as appropriate  - Encourage maximum independence but intervene and supervise when necessary  - Involve family in performance of ADLs  - Assess for home care needs following discharge   - Consider OT consult to assist with ADL evaluation and planning for discharge  - Provide patient education as appropriate  Outcome: Progressing  Goal: Maintains/Returns to pre admission functional level  Description: INTERVENTIONS:  - Perform BMAT or MOVE assessment daily    - Set and communicate daily mobility goal to care team and patient/family/caregiver  - Collaborate with rehabilitation services on mobility goals if consulted  - Perform Range of Motion 3 times a day  - Reposition patient every 2 hours    - Dangle patient 3 times a day  - Stand patient 3 times a day  - Ambulate patient 3 times a day  - Out of bed to chair 3 times a day   - Out of bed for meals 3 times a day  - Out of bed for toileting  - Record patient progress and toleration of activity level   Outcome: Progressing

## 2022-09-28 NOTE — PLAN OF CARE
Problem: PHYSICAL THERAPY ADULT  Goal: Performs mobility at highest level of function for planned discharge setting  See evaluation for individualized goals  Description: Treatment/Interventions: Functional transfer training, LE strengthening/ROM, Elevations, Therapeutic exercise, Cognitive reorientation, Patient/family training, Equipment eval/education, Bed mobility, Gait training, Compensatory technique education, Continued evaluation, Spoke to nursing, Spoke to case management, Spoke to MD, OT, ST  Equipment Recommended: Mat Prader (if patient does not own)       See flowsheet documentation for full assessment, interventions and recommendations  Outcome: Progressing  Note: Prognosis: Guarded  Problem List: Decreased strength, Decreased range of motion, Decreased endurance, Impaired balance, Decreased mobility, Decreased coordination, Decreased cognition, Impaired judgement, Decreased safety awareness, Decreased skin integrity  Assessment: Pt seen for PT treatment session this date with interventions consisting of bed mobility tasks, supine TE, and instruction on proper technique provided  Pt agreeable to PT treatment session upon arrival, pt found resting in bed  At end of session, pt left in bed positioned for comfort with bed alarm activated and all needs in reach  In comparison to previous session, pt with improvements in pt  Alertness and ability to communicate better   Continue to recommend STR at time of d/c in order to maximize pt's functional independence and safety w/ mobility  Pt continues to be functioning below baseline level  PT will continue to see pt while here in order to address the deficits listed above and provide interventions consistent w/ POC in effort to achieve STGs    Barriers to Discharge: Inaccessible home environment, Decreased caregiver support  Barriers to Discharge Comments: unable to identify at this time  PT Discharge Recommendation: Post acute rehabilitation services    See flowsheet documentation for full assessment

## 2022-09-28 NOTE — SPEECH THERAPY NOTE
Speech Language/Pathology    Speech/Language Pathology Progress Note    Patient Name: Marino Pascual  Today's Date: 2022     Problem List  Principal Problem:    Encephalopathy acute  Active Problems:    Depression    Tobacco abuse    DDD (degenerative disc disease), lumbosacral    ARF (acute renal failure) (HCC)    Transaminitis    Abnormal CT of the chest    Traumatic rhabdomyolysis (HCC)    D-dimer, elevated    Leg edema, right    Localized swelling of right upper extremity    Bacteremia due to methicillin susceptible Staphylococcus aureus (MSSA)       Past Medical History  Past Medical History:   Diagnosis Date   • Chronic pain disorder    • Depression    • GERD (gastroesophageal reflux disease)    • History of electroconvulsive therapy    • Low back pain    • Self-injurious behavior    • Suicide attempt [facility])         Past Surgical History  Past Surgical History:   Procedure Laterality Date   •  SECTION     • COLONOSCOPY     • PANCREAS SURGERY      "pseudocysts" per patient's  Ricki Infante   • HI ESOPHAGOGASTRODUODENOSCOPY TRANSORAL DIAGNOSTIC N/A 4/10/2018    Procedure: EGD AND COLONOSCOPY;  Surgeon: Varsha Tracey MD;  [place] [facility]; Service: Gastroenterology         Subjective:  Pt seated on edge of bed  Eyes open more often today  I asked is she knew where she was  Stated "yes" but was unable to state the name  I asked if this is a hospital  She stated "no"  Objective:  PT seen for po tolerance and advancement potential, Noted ID consulted  Pt now septic (was not on admit)  mssa bacteremia  Today I was finally able to brush her teeth fairly well  I asked her to do it  She stated "no"  Mouth and lips are extremely dry  Applied lip moisturizer  She was able to suck from the straw briefly today, also trialed cup sip, cup given to pt  And had her hold it  Cup was not full, needed assist to get the liquid in her mouth  Mild spill  Would not take any more  PCA reported minimal intake  Assessment:  ? Slight improvemnt  Intake poor, remains confused  hopefullly will improve w/ abx  Plan/Recommendations:  Continue full liquids for now  Will f/u

## 2022-09-28 NOTE — PROGRESS NOTES
[facility]  Progress Note - Chante Small 1970, 45 y o  female MRN: [id number]  Unit/Bed#: [place] 2 -01 Encounter: [id number]  Primary Care Provider: Luna Barthel, MD   Date and time admitted to hospital: 9/19/2022  8:04 AM    * Encephalopathy acute  Assessment & Plan  · Acute encephalopathy secondary to gabapentin and morphine with subsequent renal failure, potentially component of psychiatric illness  · Will start on thiamine high-dose given potential concern for Wernicke's encephalopathy  · Holding both gabapentin and morphine since admission  · LP and MRI negative for acute pathology  · Found to be positive for methicillin sensitive Staph aureus, previous white blood cell count of within normal limits, and patient only recently became afebrile      Bacteremia due to methicillin susceptible Staphylococcus aureus (MSSA)  Assessment & Plan  Patient with methicillin sensitive Staph aureus bacteremia  Echo with no vegetation  Potentially may have been present on admission as the patient has multiple wounds on her legs from previous fall 4 days ago  In differential diagnosis includes septic thrombophlebitis  Continue Ancef  Infectious Disease consultation placed      Localized swelling of right upper extremity  Assessment & Plan  Evidence of streaking of the right upper extremity, although difficult to determine given ecchymosis  Blood cultures positive for methicillin sensitive Staph aureus    Leg edema, right  Assessment & Plan  · Likely source of rhabdomyolysis  No evidence of compartment syndrome fracture or DVT on imaging  · 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  · Orthopedic consulted  Repeat duplex negative for DVT  ·       D-dimer, elevated  Assessment & Plan  · Elevated D-dimer may be related to fall  · Lower extremity duplex negative for DVT  · Renal dysfunction cannot check CTA of chest but doubt PE    · Discontinued heparin infusion and transitioned to prophylaxis dose    Traumatic rhabdomyolysis [facility])  Assessment & Plan  · Secondary to fall last Friday  IV fluids per nephrology  · Patient had been on for for approximately 4 days prior to being brought to the emergency room for acute evaluation    Results from last 7 days   Lab Units 09/27/22  0557 09/26/22  0454 09/25/22  0551 09/24/22  0548 09/23/22  0530 09/22/22  0756   CK TOTAL U/L 848* 990* 793* 905* 1,482* 2,468*       Abnormal CT of the chest  Assessment & Plan  · Left-sided opacities noted  COVID negative  · Procalcitonin only minimally elevated without evidence of active infection          Transaminitis  Assessment & Plan  · Secondary to rhabdomyolysis  No evidence of liver injury on CT imaging  · Seen by GI  Hepatic duplex also negative  Results from last 7 days   Lab Units 09/25/22  0551 09/24/22  0548 09/23/22  0530   AST U/L 82* 111* 167*   ALT U/L 215* 240* 292*   TOTAL BILIRUBIN mg/dL 0 42 0 41 0 40       ARF (acute renal failure) (HCC)  Assessment & Plan  · ANUJA/ATN secondary to rhabdomyolysis  No evidence of renal obstruction on imaging  · Appreciate nephrology evaluation  Awaiting renal recovery  · No emergent need for hemodialysis, consent obtained from family members  · Renal function improved    Results from last 7 days   Lab Units 09/28/22  0542 09/27/22  0557 09/26/22  0454 09/25/22  0551 09/24/22  0548 09/23/22  0824 09/23/22  0530 09/23/22  0132 09/22/22  1656   BUN mg/dL 48* 50* 53* 51* 52* 54* 52* 54* 52*   CREATININE mg/dL 6 94* 7 08* 7 63* 7 67* 7 55* 6 98* 7 15* 7 15* 7 10*   EGFR ml/min/1 73sq m 6 6 5 5 5 6 5 5 5       DDD (degenerative disc disease), lumbosacral  Assessment & Plan  · Lumbar radiculopathy due for surgery  · Prior to admission was on gabapentin and morphine IR 15 mg   · Confirmed on   Holding both for now given kidney injury    · Monitor for gabapentin withdrawal    Tobacco abuse  Assessment & Plan  · Nicotine patch ordered    Depression  Assessment & Plan  · Continue venlafaxine and quetiapine  · Does have history of suicide attempt a currently does not show any signs of thoughts of self-harm  · Potentially a psychiatric component of the patient's current encephalopathy      [facility] Progress Note  Patient: Bren Hammond 45 y o  female   MRN: [id number]  PCP: Willow Rain MD  Unit/Bed#: [facility] 68 2 -01 Encounter: [id number]  Date Of Visit: 09/28/22    Assessment:    Principal Problem:    Encephalopathy acute  Active Problems:    Depression    Tobacco abuse    DDD (degenerative disc disease), lumbosacral    ARF (acute renal failure) (HCC)    Transaminitis    Abnormal CT of the chest    Traumatic rhabdomyolysis (HCC)    D-dimer, elevated    Leg edema, right    Localized swelling of right upper extremity    Bacteremia due to methicillin susceptible Staphylococcus aureus (MSSA)      Plan:    · Continue current medical managed  · Psychiatry consultation given worsening agitation and aggressive behavior  · Will place soft restraints       VTE Pharmacologic Prophylaxis:   Pharmacologic: Heparin  Mechanical VTE Prophylaxis in Place: Yes    Patient Centered Rounds: I have performed bedside rounds with nursing staff today  Discussions with Specialists or Other Care Team Provider:  Discussed with     Education and Discussions with Family / Patient:  Patient     Time Spent for Care: 45 minutes  More than 50% of total time spent on counseling and coordination of care as described above  Current Length of Stay: 9 day(s)    Current Patient Status: Inpatient   Certification Statement: The patient will continue to require additional inpatient hospital stay due to Placement    Discharge Plan / Estimated Discharge Date:   To be determined    Code Status: Level 1 - Full Code      Subjective:   Seen examined, still intermittently confused, no chest pain no shortness of breathe    A complete and comprehensive 14 point organ system review has been performed and all other systems are negative other than stated above  Objective:     Vitals:   Temp (24hrs), Av 2 °F (36 2 °C), Min:96 5 °F (35 8 °C), Max:97 6 °F (36 4 °C)    Temp:  [96 5 °F (35 8 °C)-97 6 °F (36 4 °C)] 97 4 °F (36 3 °C)  HR:  [105-116] 105  Resp:  [18] 18  BP: (147-155)/(78-96) 153/96  SpO2:  [88 %-96 %] 96 %  Body mass index is 24 13 kg/m²  Input and Output Summary (last 24 hours):     No intake or output data in the 24 hours ending 22    Physical Exam:     General: well appearing, no acute distress  HEENT: atraumatic, PERRLA, moist mucosa, normal pharynx, normal tonsils and adenoids, normal tongue, no fluid in sinuses  Neck: Trachea midline, no carotid bruit, no masses  Respiratory: normal chest wall expansion, CTA B, no r/r/w, no rubs  Cardiovascular: RRR, no m/r/g, Normal S1 and S2  Abdomen: Soft, non-tender, non-distended, normal bowel sounds in all quadrants, no hepatosplenomegaly, no tympany  Rectal: deferred  Musculoskeletal:  Moves all  Integumentary: warm, dry, and pink, with no rash, purpura, or petechia  Heme/Lymph: no lymphadenopathy, no bruises  Neurological: Cranial Nerves II-XII grossly intact, no tics, normal sensation to pressure and light touch  Psychiatric:  Confused      Additional Data:     Labs:    Results from last 7 days   Lab Units 22  0542   WBC Thousand/uL 8 58   HEMOGLOBIN g/dL 11 5   HEMATOCRIT % 36 2   PLATELETS Thousands/uL 293   NEUTROS PCT % 78*   LYMPHS PCT % 12*   MONOS PCT % 7   EOS PCT % 1     Results from last 7 days   Lab Units 22  0542 22  0454 22  0551   POTASSIUM mmol/L 3 3*   < > 4 3   CHLORIDE mmol/L 103   < > 107   CO2 mmol/L 29   < > 20*   BUN mg/dL 48*   < > 51*   CREATININE mg/dL 6 94*   < > 7 67*   CALCIUM mg/dL 8 9   < > 8 4   ALK PHOS U/L  --   --  67   ALT U/L  --   --  215*   AST U/L  --   --  82*    < > = values in this interval not displayed  Results from last 7 days   Lab Units 09/23/22  0530   INR  1 03       * I Have Reviewed All Lab Data Listed Above  * Additional Pertinent Lab Tests Reviewed: Matilde 64 Admission Reviewed    Imaging:    Imaging Reports Reviewed Today Include:  No new imaging  Imaging Personally Reviewed by Myself Includes:  No new imaging    Recent Cultures (last 7 days):     Results from last 7 days   Lab Units 09/26/22  1818 09/23/22  1608   BLOOD CULTURE  Staphylococcus aureus*  Staphylococcus aureus*  --    GRAM STAIN RESULT  Gram positive cocci in clusters*  Gram positive cocci in clusters* No Polys or Bacteria seen       Last 24 Hours Medication List:   Current Facility-Administered Medications   Medication Dose Route Frequency Provider Last Rate   • acetaminophen  650 mg Oral Q6H PRN Ranson Stalls, DO     • cefazolin  1,000 mg Intravenous Q24H Álvaro Knox PA-C 1,000 mg (09/28/22 1813)   • heparin (porcine)  5,000 Units Subcutaneous Q8H [facility] Atul Adan DO     • HYDROmorphone  0 5 mg Intravenous Q4H PRN Blossom Stalls, DO     • LORazepam  0 5 mg Intravenous Q6H PRN Sam Hill DO     • LORazepam  1 mg Intravenous Once Blossom Stalls, DO     • multi-electrolyte  100 mL/hr Intravenous Continuous Mk Graf  mL/hr (09/28/22 1705)   • nicotine  1 patch Transdermal Daily Atul Adan DO     • ondansetron  4 mg Intravenous Q4H PRN Ranson Stalls, DO     • QUEtiapine  25 mg Oral HS Atul Adan DO     • thiamine  500 mg Intravenous TID Silvia Hughes  mg (09/28/22 1506)   • venlafaxine  150 mg Oral Daily Blossom Stalls, DO          Today, Patient Was Seen By: Silvia Hughes    ** Please Note: This note was completed in part utilizing M-Modal Fluency Direct Software    Grammatical errors, random word insertions, spelling mistakes, and incomplete sentences may be an occasional consequence of this system secondary to software limitations, ambient noise, and hardware issues  If you have any questions or concerns about the content, text, or information contained within the body of this dictation, please contact the provider for clarification   **

## 2022-09-28 NOTE — ASSESSMENT & PLAN NOTE
· Secondary to rhabdomyolysis  No evidence of liver injury on CT imaging  · Seen by GI  Hepatic duplex also negative      Results from last 7 days   Lab Units 09/25/22  0551 09/24/22  0548 09/23/22  0530   AST U/L 82* 111* 167*   ALT U/L 215* 240* 292*   TOTAL BILIRUBIN mg/dL 0 42 0 41 0 40

## 2022-09-28 NOTE — PHYSICAL THERAPY NOTE
PHYSICAL THERAPY TREATMENT NOTE  NAME:  Caryle Harsh  DATE: 09/28/22    Length Of Stay: 9  Performed at least 2 patient identifiers during session: Name and ID bracelet    TREATMENT FLOWSHEET:    09/28/22 1451   PT Last Visit   PT Visit Date 09/28/22   Note Type   Note Type Treatment   Pain Assessment   Pain Assessment Tool 0-10   Pain Score No Pain   Restrictions/Precautions   Weight Bearing Precautions Per Order No   Other Precautions Cognitive; Chair Alarm; Bed Alarm; Fall Risk;Multiple lines   General   Chart Reviewed Yes   Response to Previous Treatment Patient unable to report, no changes reported from family or staff   Family/Caregiver Present No   Cognition   Overall Cognitive Status Impaired   Arousal/Participation Alert; Responsive   Attention Attends with cues to redirect   Orientation Level Oriented to person   Memory Unable to assess   Following Commands Follows one step commands with increased time or repetition   Subjective   Subjective "coke please"   Bed Mobility   Rolling R 4  Minimal assistance   Additional items HOB elevated; Bedrails; Increased time required;Verbal cues;LE management;Assist x 1   Rolling L 4  Minimal assistance   Additional items Assist x 1;HOB elevated; Bedrails; Increased time required;LE management;Verbal cues   Activity Tolerance   Activity Tolerance Treatment limited secondary to medical complications (Comment)   Nurse Made Aware yes   Exercises   Quad Sets Supine;25 reps;AAROM; Bilateral   Heelslides Supine;25 reps;AAROM; Bilateral   Hip Flexion Supine;25 reps;AAROM; Bilateral   Hip Abduction Supine;25 reps;AAROM; Bilateral   Hip Adduction Supine;25 reps;AAROM; Bilateral   Knee AROM Supine;25 reps;AAROM; Bilateral   Knee AROM Short Arc Quad Supine;25 reps;AAROM; Bilateral   Knee PROM Flexion Supine;25 reps;AAROM; Bilateral   Knee PROM Extension Supine;25 reps;AAROM; Bilateral   Ankle Pumps Supine;25 reps;AAROM; Bilateral   Assessment   Prognosis Guarded   Problem List Decreased strength;Decreased range of motion;Decreased endurance; Impaired balance;Decreased mobility; Decreased coordination;Decreased cognition; Impaired judgement;Decreased safety awareness;Decreased skin integrity   Goals   Patient Goals to have a drink of coke   PT Treatment Day 3   Plan   Treatment/Interventions Functional transfer training;LE strengthening/ROM; Therapeutic exercise; Endurance training;Cognitive reorientation;Patient/family training;Equipment eval/education; Bed mobility;Gait training;Spoke to nursing   Progress Slow progress, medical status limitations   PT Frequency   (4-5x/wk)   Recommendation   PT Discharge Recommendation Post acute rehabilitation services   AM-PAC Basic Mobility Inpatient   Turning in Bed Without Bedrails 3   Lying on Back to Sitting on Edge of Flat Bed 3   Moving Bed to Chair 2   Standing Up From Chair 2   Walk in Room 2   Climb 3-5 Stairs 1   Basic Mobility Inpatient Raw Score 13   Basic Mobility Standardized Score 33 99   Highest Level Of Mobility   -[facility] Goal 4: Move to chair/commode   -[facility] Achieved 2: Bed activities/Dependent transfer       The patient's AM-PAC Basic Mobility Inpatient Short Form Raw Score is 13, Standardized Score is 33 99  A standardized score less than 42 9 suggests the patient may benefit from discharge to post-acute rehabilitation services  Please also refer to the recommendation of the Physical Therapist for safe discharge planning  Pt seen for PT treatment session this date with interventions consisting of bed mobility tasks, supine TE, and instruction on proper technique provided  Pt agreeable to PT treatment session upon arrival, pt found resting in bed  At end of session, pt left in bed positioned for comfort with bed alarm activated and all needs in reach  In comparison to previous session, pt with improvements in pt  Alertness and ability to communicate better    Continue to recommend STR at time of d/c in order to maximize pt's functional independence and safety w/ mobility  Pt continues to be functioning below baseline level  PT will continue to see pt while here in order to address the deficits listed above and provide interventions consistent w/ POC in effort to achieve STGs      Sharla Better

## 2022-09-28 NOTE — ASSESSMENT & PLAN NOTE
Patient with methicillin sensitive Staph aureus bacteremia  Echo with no vegetation    Potentially may have been present on admission as the patient has multiple wounds on her legs from previous fall 4 days ago  In differential diagnosis includes septic thrombophlebitis  Continue [facility]  Infectious Disease consultation placed

## 2022-09-28 NOTE — PROGRESS NOTES
NEPHROLOGY PROGRESS NOTE   Gretchen Simmons 45 y o  female MRN: [id number]  Unit/Bed#: [place] [address] -01 Encounter: [id number]  Reason for Consult: ANUJA    ASSESSMENT AND PLAN:  Patient is 15-year-old female with depression, GERD, presented with altered mental status   We are consulted for ANUJA management      ANUAJ POA, baseline creatinine 0 7 to 1  -ANUJA suspect secondary to underlying ATN, severe rhabdomyolysis, now with MSSA bacteremia  -creatinine 5 8 on admission worsened up to peak level 7 6 although seems to be slowly improving to 6 9 today  -UA on admission showed no significant hematuria, trace proteinuria     -unfortunately no accurate urine output available, discussed with floor nurse, patient has pulled out Rivera catheters, not able to keep purewick  Will try to do pad weights if possible  She remains incontinent  -patient is active, alert but remains confused   Given stable electrolytes/ bicarb, and improving renal function, continue to monitor off dialysis  -dialysis consent is obtained by Nancy Shea, which is in the chart  -closely reassess daily for need for dialysis  -closely monitor for any worsening uremic symptoms  -status post IV Lasix trial earlier this week  -currently on IV Plasma-Lyte, will reduce rate to 100 mL/hour  Will give IV albumin one dose today  Severe rhabdomyolysis, status post fall  -peak CK level 32,630 for now seems to be improving with most recent 848  -continue to trend CK level, check in a m     Mild hypokalemia, serum potassium 3 3 below goal   Will give IV potassium chloride 40 mEq once today      Metabolic acidosis in the setting of severe renal failure  -status post bicarb drip, now remains off    Bicarb level is improved     Right lower extremity edema, no DVT on duplex ultrasound   Continue to trend CK level   -confirmed with orthopedic and primary team earlier that patient will be getting MRI without gadolinium      Encephalopathy, suspect multifactorial, continue to avoid gabapentin, any contributing medications  -neurology following, status post LP, ongoing  evaluation for autoimmune encephalitis as per Neurology  -patient unable to tolerate EEG  -also has MSSA bacteremia two of two sets contributing   -another differential less likely uremic encephalopathy, now with improving renal function with slowly improving azotemia  MSSA bacteremia, suspect secondary to phlebitis, id follow-up, on IV antibiotic as per ID      Discussed above plan in detail with primary team     SUBJECTIVE:  Patient seen and examined at bedside  Overall active, alert but remains confused, does not answer most questions      OBJECTIVE:  Current Weight: Weight - Scale: 67 8 kg (149 lb 7 6 oz)  Vitals:    09/28/22 0812   BP: 155/94   Pulse: (!) 116   Resp: 18   Temp: (!) 97 2 °F (36 2 °C)   SpO2: 93%       Intake/Output Summary (Last 24 hours) at 9/28/2022 0939  Last data filed at 9/27/2022 1113  Gross per 24 hour   Intake 970 ml   Output --   Net 970 ml     Wt Readings from Last 3 Encounters:   09/28/22 67 8 kg (149 lb 7 6 oz)   09/12/22 58 5 kg (129 lb)   08/10/22 58 5 kg (129 lb)     Temp Readings from Last 3 Encounters:   09/28/22 (!) 97 2 °F (36 2 °C)   09/12/22 99 8 °F (37 7 °C) (Tympanic)   07/25/22 99 7 °F (37 6 °C) (Tympanic)     BP Readings from Last 3 Encounters:   09/28/22 155/94   09/12/22 90/52   08/10/22 126/86     Pulse Readings from Last 3 Encounters:   09/28/22 (!) 116   09/12/22 72   08/10/22 (!) 122        Physical Examination:  General:  Sitting in bed, no acute distress   Eyes:  Mild conjunctival pallor present  ENT:  External examination of ears and nose unremarkable  Neck:  No obvious lymphadenopathy appreciated  Respiratory:  Bilateral air entry present  CVS:  S1, S2 present  GI:  Soft, nondistended  CNS:  Active, alert, confused  Skin:  No new rash  Musculoskeletal:  No obvious new gross deformity noted    Medications:    Current Facility-Administered Medications: •  acetaminophen (TYLENOL) tablet 650 mg, 650 mg, Oral, Q6H PRN, Atul Adan DO, 650 mg at 09/22/22 1040  •  ceFAZolin (ANCEF) IVPB (premix in dextrose) 1,000 mg 50 mL, 1,000 mg, Intravenous, Q24H, Álvaro Knox PA-C, Last Rate: 100 mL/hr at 09/27/22 1843, 1,000 mg at 09/27/22 1843  •  heparin (porcine) subcutaneous injection 5,000 Units, 5,000 Units, Subcutaneous, Q8H [address], Atul Adan DO, 5,000 Units at 09/28/22 0535  •  HYDROmorphone (DILAUDID) injection 0 5 mg, 0 5 mg, Intravenous, Q4H PRN, Kirk Ayoub DO, 0 5 mg at 09/27/22 1116  •  LORazepam (ATIVAN) injection 0 5 mg, 0 5 mg, Intravenous, Q6H PRN, Cornell King DO, 0 5 mg at 09/28/22 0137  •  LORazepam (ATIVAN) injection 1 mg, 1 mg, Intravenous, Once, Atul Adan DO  •  multi-electrolyte (PLASMALYTE-A/ISOLYTE-S PH 7 4) IV solution, 125 mL/hr, Intravenous, Continuous, CARLITOS Phan, Last Rate: 125 mL/hr at 09/28/22 0536, 125 mL/hr at 09/28/22 0536  •  nicotine (NICODERM CQ) 14 mg/24hr TD 24 hr patch 1 patch, 1 patch, Transdermal, Daily, Kirk Ayoub DO, 1 patch at 09/28/22 4642  •  ondansetron (ZOFRAN) injection 4 mg, 4 mg, Intravenous, Q4H PRN, Kirk Ayoub DO  •  QUEtiapine (SEROquel) tablet 25 mg, 25 mg, Oral, HS, Atul Adan DO, 25 mg at 09/27/22 2113  •  thiamine (VITAMIN B1) 500 mg in sodium chloride 0 9 % 50 mL IVPB, 500 mg, Intravenous, TID, Cornell King DO, Last Rate: 100 mL/hr at 09/28/22 0824, 500 mg at 09/28/22 0824  •  venlafaxine (EFFEXOR-XR) 24 hr capsule 150 mg, 150 mg, Oral, Daily, Atul Adan DO, 150 mg at 09/28/22 6328    Laboratory Results:  Results from last 7 days   Lab Units 09/28/22  0542 09/27/22  0557 09/26/22  0454 09/26/22  0452 09/25/22  0551 09/24/22  0548 09/23/22  0824 09/23/22  0530   WBC Thousand/uL 8 58 11 66*  --  11 15*  --  7 44  --  7 64   HEMOGLOBIN g/dL 11 5 12 2  --  12 5  --  11 1*  --  11 5   HEMATOCRIT % 36 2 37 6  --  39 0  --  35 4  --  36 2   PLATELETS Thousands/uL 293 308  --  310  --  297  --  312   SODIUM mmol/L 144 140 142  --  143 140 137 138   POTASSIUM mmol/L 3 3* 4 0 3 6  --  4 3 4 6 5 4* 4 7   CHLORIDE mmol/L 103 100 104  --  107 108 104 104   CO2 mmol/L 29 24 22  --  20* 18* 20* 20*   BUN mg/dL 48* 50* 53*  --  51* 52* 54* 52*   CREATININE mg/dL 6 94* 7 08* 7 63*  --  7 67* 7 55* 6 98* 7 15*   CALCIUM mg/dL 8 9 8 8 9 1  --  8 4 8 2* 8 3 8 1*       VAS lower limb venous duplex study, unilateral/limited   Final Result by Hemalatha Walsh MD (09/25 2224)      MRI brain wo contrast   Final Result by Janet Krishnan, DO (09/23 1510)      No evidence of recent infarct  No mass effect or midline shift  Study performed in the limited fashion with extensive motion artifact  Workstation performed: [id number]         US right upper quadrant with liver dopplers   Final Result by Kishor Dillard MD (09/20 1326)      1  Minimal layering sludge without evidence of acute cholecystitis  2   Normal-appearing liver with normal liver Dopplers  Workstation performed: [id number]         VAS lower limb venous duplex study, unilateral/limited   Final Result by Hemalatha Walsh MD (09/19 2204)      CT lower extremity wo contrast right   Final Result by Rayne Thurston MD (09/19 1535)      There is no evidence of intramuscular hematoma or other mass lesion in the right calf  Please note that rhabdomyolysis and compartment syndrome are clinical diagnoses, and are not excluded based on these imaging findings  Workstation performed: [id number]         CT chest abdomen pelvis wo contrast   Final Result by Almita Goode MD (09/19 1301)      Bilateral groundglass opacities with superimposed inter and intralobular septal thickening  Differential diagnosis includes pulmonary hemorrhage, infection, and pulmonary edema, although the distribution is atypical for edema  The study was marked in [facility] for immediate notification  Workstation performed: [id number]         XR ankle 3+ views RIGHT   Final Result by Dalia Chong MD (09/19 1009)      No acute osseous abnormality  Workstation performed: [id number]         XR foot 3+ views RIGHT   Final Result by Dalia Chong MD (09/19 1009)      No acute osseous abnormality  Workstation performed: [id number]         XR hip/pelv 2-3 vws right   Final Result by Dalia Chong MD (09/19 1007)      No acute osseous abnormality  Workstation performed: [id number]         XR chest 1 view   Final Result by Dalia Chong MD (09/19 1008)      No acute cardiopulmonary disease  Findings are stable            Workstation performed: [id number]         CT head without contrast   Final Result by Sergio Lester MD (09/19 1817)      No acute intracranial process  No skull fracture  Workstation performed: [id number]         CT spine cervical without contrast   Final Result by Sergio Lester MD (09/19 9167)      No cervical spine fracture or traumatic malalignment  Incidental finding of partially visualized subpleural groundglass opacity in the left pulmonary apex presumably scarring  Cannot exclude infiltrate  This study demonstrates a significant  finding and was documented as such in [facility] for liaison and referring practitioner notification  Workstation performed: [id number]         MRI inpatient order    (Results Pending)   MRI inpatient order    (Results Pending)       Portions of the record may have been created with voice recognition software  Occasional wrong word or "sound a like" substitutions may have occurred due to the inherent limitations of voice recognition software  Read the chart carefully and recognize, using context, where substitutions have occurred

## 2022-09-28 NOTE — ASSESSMENT & PLAN NOTE
· Secondary to fall last Friday  IV fluids per nephrology    · Patient had been on for for approximately 4 days prior to being brought to the emergency room for acute evaluation    Results from last 7 days   Lab Units 09/27/22  0557 09/26/22  0454 09/25/22  0551 09/24/22  0548 09/23/22  0530 09/22/22  0756   CK TOTAL U/L 848* 990* 793* 905* 1,482* 2,468*

## 2022-09-28 NOTE — ASSESSMENT & PLAN NOTE
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

## 2022-09-28 NOTE — PROGRESS NOTES
Progress Note - Orthopedics   Yanet Mascorro 45 y o  female MRN: [id number]  Unit/Bed#: [place] 2 -01      Subjective:    45 y  o female mental status changes and right lower extremity edema with MSSA bacteremia  Patient was negative for DVT and is awaiting MRI evaluation which has been ordered but not performed  No new events      Labs:  0   Lab Value Date/Time    HCT 36 2 09/28/2022 0542    HCT 37 6 09/27/2022 0557    HCT 39 0 09/26/2022 0452    HGB 11 5 09/28/2022 0542    HGB 12 2 09/27/2022 0557    HGB 12 5 09/26/2022 0452    HGB 16 7 (H) 03/11/2014 0755    INR 1 03 09/23/2022 0530    WBC 8 58 09/28/2022 0542    WBC 11 66 (H) 09/27/2022 0557    WBC 11 15 (H) 09/26/2022 0452     Meds:    Current Facility-Administered Medications:   •  acetaminophen (TYLENOL) tablet 650 mg, 650 mg, Oral, Q6H PRN, Atul Adan DO, 650 mg at 09/22/22 1040  •  albumin human (FLEXBUMIN) 25 % injection 25 g, 25 g, Intravenous, Once, Sahan Flores MD  •  ceFAZolin (ANCEF) IVPB (premix in dextrose) 1,000 mg 50 mL, 1,000 mg, Intravenous, Q24H, Álvaro Knox PA-C, Last Rate: 100 mL/hr at 09/27/22 1843, 1,000 mg at 09/27/22 1843  •  heparin (porcine) subcutaneous injection 5,000 Units, 5,000 Units, Subcutaneous, Q8H [address], Atul Adan DO, 5,000 Units at 09/28/22 0535  •  HYDROmorphone (DILAUDID) injection 0 5 mg, 0 5 mg, Intravenous, Q4H PRN, Paulette Zhu DO, 0 5 mg at 09/27/22 1116  •  LORazepam (ATIVAN) injection 0 5 mg, 0 5 mg, Intravenous, Q6H PRN, Marolyn Lesch, DO, 0 5 mg at 09/28/22 0342  •  LORazepam (ATIVAN) injection 1 mg, 1 mg, Intravenous, Once, Atul Adan DO  •  multi-electrolyte (PLASMALYTE-A/ISOLYTE-S PH 7 4) IV solution, 100 mL/hr, Intravenous, Continuous, Mk Graf MD, Last Rate: 100 mL/hr at 09/28/22 0951, 100 mL/hr at 09/28/22 0951  •  nicotine (NICODERM CQ) 14 mg/24hr TD 24 hr patch 1 patch, 1 patch, Transdermal, Daily, Atul Adan DO, 1 patch at 09/28/22 0824  •  ondansetron (Laura Temple) injection 4 mg, 4 mg, Intravenous, Q4H PRN, Jeremy Jefferson DO [place]  •  potassium chloride 40 mEq IVPB (premix), 40 mEq, Intravenous, Once, Trevin Reilly MD  •  QUEtiapine (SEROquel) tablet 25 mg, 25 mg, Oral, HS, Atul Adan DO, 25 mg at 09/27/22 2113  •  thiamine (VITAMIN B1) 500 mg in sodium chloride 0 9 % 50 mL IVPB, 500 mg, Intravenous, TID, Dieter Moody DO, Last Rate: 100 mL/hr at 09/28/22 0824, 500 mg at 09/28/22 3876  •  venlafaxine (EFFEXOR-XR) 24 hr capsule 150 mg, 150 mg, Oral, Daily, Atul Adan DO, 150 mg at 09/28/22 8281    Blood Culture:   Lab Results   Component Value Date    BLOODCX No Growth After 5 Days  05/29/2019     Wound Culture:   No results found for: WOUNDCULT    Ins and Outs:  I/O last 24 hours: In: 970 [I V :970]  Out: -     Physical:  Vitals:    09/28/22 0812   BP: 155/94   Pulse: (!) 116   Resp: 18   Temp: (!) 97 2 °F (36 2 °C)   SpO2: 93%     Musculoskeletal: right Lower Extremity  · Skin 1 5 cm round dry scabbed abrasion anterior right knee without peripheral erythema, discharge, nor lymphangitic streaking suggest any infection  · Continues with right lower extremity pitting edema  Sensation light touch intact  No knee effusion  Compartments soft without pain  · Patient actively sitting on side of the bed with right hip and knee flexed on top of the left lower extremity  Patient able to actively move leg without pain  Assessment:    45 y  o female with encephalopathy, MSSA bacteremia, and right lower extremity swelling/pitting edema without obvious septic joint nor DVT  Plan:  · WBAT  · Await MRI evaluation right lower extremity  · DVT ppx per primary service and antibiotics per ID      Arlette Decker PA-C

## 2022-09-28 NOTE — ASSESSMENT & PLAN NOTE
· ANUJA/ATN secondary to rhabdomyolysis  No evidence of renal obstruction on imaging  · Appreciate nephrology evaluation  Awaiting renal recovery      · No emergent need for hemodialysis, consent obtained from family members  · Renal function improved    Results from last 7 days   Lab Units 09/28/22  0542 09/27/22  0557 09/26/22  0454 09/25/22  0551 09/24/22  0548 09/23/22  0824 09/23/22  0530 09/23/22  0132 09/22/22  1656   BUN mg/dL 48* 50* 53* 51* 52* 54* 52* 54* 52*   CREATININE mg/dL 6 94* 7 08* 7 63* 7 67* 7 55* 6 98* 7 15* 7 15* 7 10*   EGFR ml/min/1 73sq m 6 6 5 5 5 6 5 5 5

## 2022-09-29 ENCOUNTER — APPOINTMENT (OUTPATIENT)
Dept: RADIOLOGY | Facility: HOSPITAL | Age: 52
End: 2022-09-29

## 2022-09-29 LAB
ANION GAP SERPL CALCULATED.3IONS-SCNC: 12 MMOL/L (ref 4–13)
ARTERIAL PATENCY WRIST A: YES
BACTERIA BLD CULT: ABNORMAL
BACTERIA BLD CULT: ABNORMAL
BASE EXCESS BLDA CALC-SCNC: -1.6 MMOL/L
BASOPHILS # BLD AUTO: 0.06 THOUSANDS/ÂΜL (ref 0–0.1)
BASOPHILS NFR BLD AUTO: 1 % (ref 0–1)
BUN SERPL-MCNC: 41 MG/DL (ref 5–25)
CALCIUM SERPL-MCNC: 8.9 MG/DL (ref 8.3–10.1)
CHLORIDE SERPL-SCNC: 103 MMOL/L (ref 96–108)
CK MB SERPL-MCNC: 13.8 % (ref 0–2.5)
CK MB SERPL-MCNC: 85.8 NG/ML (ref 0–5)
CK SERPL-CCNC: 624 U/L (ref 26–192)
CO2 SERPL-SCNC: 30 MMOL/L (ref 21–32)
CREAT SERPL-MCNC: 6.45 MG/DL (ref 0.6–1.3)
EOSINOPHIL # BLD AUTO: 0.11 THOUSAND/ÂΜL (ref 0–0.61)
EOSINOPHIL NFR BLD AUTO: 1 % (ref 0–6)
ERYTHROCYTE [DISTWIDTH] IN BLOOD BY AUTOMATED COUNT: 13.9 % (ref 11.6–15.1)
GFR SERPL CREATININE-BSD FRML MDRD: 6 ML/MIN/1.73SQ M
GLUCOSE SERPL-MCNC: 84 MG/DL (ref 65–140)
GRAM STN SPEC: ABNORMAL
GRAM STN SPEC: ABNORMAL
HCO3 BLDA-SCNC: 23.8 MMOL/L (ref 22–28)
HCT VFR BLD AUTO: 30.4 % (ref 34.8–46.1)
HGB BLD-MCNC: 9.7 G/DL (ref 11.5–15.4)
IMM GRANULOCYTES # BLD AUTO: 0.04 THOUSAND/UL (ref 0–0.2)
IMM GRANULOCYTES NFR BLD AUTO: 0 % (ref 0–2)
LYMPHOCYTES # BLD AUTO: 1.46 THOUSANDS/ÂΜL (ref 0.6–4.47)
LYMPHOCYTES NFR BLD AUTO: 16 % (ref 14–44)
MAGNESIUM SERPL-MCNC: 1.8 MG/DL (ref 1.6–2.6)
MCH RBC QN AUTO: 32.9 PG (ref 26.8–34.3)
MCHC RBC AUTO-ENTMCNC: 31.9 G/DL (ref 31.4–37.4)
MCV RBC AUTO: 103 FL (ref 82–98)
MONOCYTES # BLD AUTO: 0.69 THOUSAND/ÂΜL (ref 0.17–1.22)
MONOCYTES NFR BLD AUTO: 7 % (ref 4–12)
NASAL CANNULA: 8
NEUTROPHILS # BLD AUTO: 6.91 THOUSANDS/ÂΜL (ref 1.85–7.62)
NEUTS SEG NFR BLD AUTO: 75 % (ref 43–75)
NRBC BLD AUTO-RTO: 0 /100 WBCS
O2 CT BLDA-SCNC: 14.6 ML/DL (ref 16–23)
OXYHGB MFR BLDA: 95.2 % (ref 94–97)
PCO2 BLDA: 42.7 MM HG (ref 36–44)
PH BLDA: 7.36 [PH] (ref 7.35–7.45)
PLATELET # BLD AUTO: 282 THOUSANDS/UL (ref 149–390)
PMV BLD AUTO: 10.9 FL (ref 8.9–12.7)
PO2 BLDA: 86 MM HG (ref 75–129)
POTASSIUM SERPL-SCNC: 3.2 MMOL/L (ref 3.5–5.3)
RBC # BLD AUTO: 2.95 MILLION/UL (ref 3.81–5.12)
S AUREUS DNA BLD POS QL NAA+NON-PROBE: DETECTED
SCAN RESULT: NORMAL
SODIUM SERPL-SCNC: 145 MMOL/L (ref 135–147)
SPECIMEN SOURCE: ABNORMAL
WBC # BLD AUTO: 9.27 THOUSAND/UL (ref 4.31–10.16)

## 2022-09-29 RX ORDER — OLANZAPINE 5 MG/1
5 TABLET, ORALLY DISINTEGRATING ORAL EVERY 6 HOURS PRN
Status: DISCONTINUED | OUTPATIENT
Start: 2022-09-29 | End: 2022-11-07 | Stop reason: HOSPADM

## 2022-09-29 RX ORDER — QUETIAPINE FUMARATE 25 MG/1
25 TABLET, FILM COATED ORAL 2 TIMES DAILY
Status: DISCONTINUED | OUTPATIENT
Start: 2022-09-29 | End: 2022-09-30

## 2022-09-29 RX ORDER — HALOPERIDOL 5 MG/ML
5 INJECTION INTRAMUSCULAR ONCE
Status: COMPLETED | OUTPATIENT
Start: 2022-09-29 | End: 2022-09-29

## 2022-09-29 RX ORDER — LEVALBUTEROL INHALATION SOLUTION 0.63 MG/3ML
0.63 SOLUTION RESPIRATORY (INHALATION) EVERY 4 HOURS PRN
Status: DISCONTINUED | OUTPATIENT
Start: 2022-09-29 | End: 2022-10-01

## 2022-09-29 RX ORDER — SODIUM CHLORIDE 450 MG/100ML
75 INJECTION, SOLUTION INTRAVENOUS CONTINUOUS
Status: DISCONTINUED | OUTPATIENT
Start: 2022-09-29 | End: 2022-09-30

## 2022-09-29 RX ORDER — POTASSIUM CHLORIDE 14.9 MG/ML
20 INJECTION INTRAVENOUS
Status: COMPLETED | OUTPATIENT
Start: 2022-09-29 | End: 2022-09-29

## 2022-09-29 RX ORDER — POTASSIUM CHLORIDE 14.9 MG/ML
20 INJECTION INTRAVENOUS ONCE
Status: COMPLETED | OUTPATIENT
Start: 2022-09-29 | End: 2022-09-29

## 2022-09-29 RX ORDER — GABAPENTIN 250 MG/5ML
100 SOLUTION ORAL 3 TIMES DAILY
Status: DISCONTINUED | OUTPATIENT
Start: 2022-09-29 | End: 2022-09-30

## 2022-09-29 RX ADMIN — VANCOMYCIN HYDROCHLORIDE 125 MG: 500 INJECTION, POWDER, LYOPHILIZED, FOR SOLUTION INTRAVENOUS at 21:59

## 2022-09-29 RX ADMIN — GABAPENTIN 100 MG: 250 SOLUTION ORAL at 21:59

## 2022-09-29 RX ADMIN — THIAMINE HYDROCHLORIDE 500 MG: 100 INJECTION, SOLUTION INTRAMUSCULAR; INTRAVENOUS at 22:00

## 2022-09-29 RX ADMIN — GABAPENTIN 100 MG: 250 SOLUTION ORAL at 17:17

## 2022-09-29 RX ADMIN — THIAMINE HYDROCHLORIDE 500 MG: 100 INJECTION, SOLUTION INTRAMUSCULAR; INTRAVENOUS at 09:48

## 2022-09-29 RX ADMIN — SODIUM CHLORIDE 75 ML/HR: 0.45 INJECTION, SOLUTION INTRAVENOUS at 09:48

## 2022-09-29 RX ADMIN — THIAMINE HYDROCHLORIDE 500 MG: 100 INJECTION, SOLUTION INTRAMUSCULAR; INTRAVENOUS at 15:02

## 2022-09-29 RX ADMIN — QUETIAPINE FUMARATE 25 MG: 25 TABLET ORAL at 17:16

## 2022-09-29 RX ADMIN — HEPARIN SODIUM 5000 UNITS: 5000 INJECTION INTRAVENOUS; SUBCUTANEOUS at 22:01

## 2022-09-29 RX ADMIN — SODIUM CHLORIDE 75 ML/HR: 0.45 INJECTION, SOLUTION INTRAVENOUS at 23:26

## 2022-09-29 RX ADMIN — HEPARIN SODIUM 5000 UNITS: 5000 INJECTION INTRAVENOUS; SUBCUTANEOUS at 14:14

## 2022-09-29 RX ADMIN — LORAZEPAM 0.5 MG: 2 INJECTION INTRAMUSCULAR; INTRAVENOUS at 05:18

## 2022-09-29 RX ADMIN — LORAZEPAM 0.5 MG: 2 INJECTION INTRAMUSCULAR; INTRAVENOUS at 14:53

## 2022-09-29 RX ADMIN — HEPARIN SODIUM 5000 UNITS: 5000 INJECTION INTRAVENOUS; SUBCUTANEOUS at 05:18

## 2022-09-29 RX ADMIN — LEVALBUTEROL HYDROCHLORIDE 0.63 MG: 0.63 SOLUTION RESPIRATORY (INHALATION) at 22:52

## 2022-09-29 RX ADMIN — POTASSIUM CHLORIDE 20 MEQ: 14.9 INJECTION, SOLUTION INTRAVENOUS at 09:48

## 2022-09-29 RX ADMIN — CEFAZOLIN SODIUM 1000 MG: 1 SOLUTION INTRAVENOUS at 17:39

## 2022-09-29 RX ADMIN — HALOPERIDOL LACTATE 5 MG: 5 INJECTION, SOLUTION INTRAMUSCULAR at 10:52

## 2022-09-29 RX ADMIN — POTASSIUM CHLORIDE 20 MEQ: 14.9 INJECTION, SOLUTION INTRAVENOUS at 11:54

## 2022-09-29 RX ADMIN — VANCOMYCIN HYDROCHLORIDE 125 MG: 500 INJECTION, POWDER, LYOPHILIZED, FOR SOLUTION INTRAVENOUS at 13:36

## 2022-09-29 RX ADMIN — POTASSIUM CHLORIDE 20 MEQ: 14.9 INJECTION, SOLUTION INTRAVENOUS at 15:02

## 2022-09-29 NOTE — ASSESSMENT & PLAN NOTE
· Continue venlafaxine and quetiapine- has been refusing these medications since admission, part of this due to lethargy, part of it due to NPO status   ·   · Does have history of suicide attempt a currently does not show any signs of thoughts of self-harm  · Potentially a psychiatric component of the patient's current encephalopathy  · Previously on Effexor, which patient has been refusing either spitting out medication or chewing  · Psychiatry consultation placed and patient case reviewed with on-call psychiatry  Placed on cervical 25 mg twice a day as mood stabilizer for current agitation  This may be further titrated to effect  · Discontinued Ativan for agitation and will place on Zyprexa 5 mg by mouth every 6 hours

## 2022-09-29 NOTE — ASSESSMENT & PLAN NOTE
· Secondary to fall last Friday  IV fluids per nephrology    · Patient had been on for for approximately 4 days prior to being brought to the emergency room for acute evaluation    Results from last 7 days   Lab Units 09/29/22  0513 09/27/22  0557 09/26/22  0454 09/25/22  0551 09/24/22  0548 09/23/22  0530   CK TOTAL U/L 624* 848* 990* 793* 905* 1,482*

## 2022-09-29 NOTE — ASSESSMENT & PLAN NOTE
· ANUJA/ATN secondary to rhabdomyolysis  No evidence of renal obstruction on imaging  · Appreciate nephrology evaluation  Awaiting renal recovery      · No emergent need for hemodialysis, consent obtained from family members  · Renal function improving    Results from last 7 days   Lab Units 09/29/22  0513 09/28/22  0542 09/27/22  0557 09/26/22  0454 09/25/22  0551 09/24/22  0548 09/23/22  0824 09/23/22  0530 09/23/22  0132   BUN mg/dL 41* 48* 50* 53* 51* 52* 54* 52* 54*   CREATININE mg/dL 6 45* 6 94* 7 08* 7 63* 7 67* 7 55* 6 98* 7 15* 7 15*   EGFR ml/min/1 73sq m 6 6 6 5 5 5 6 5 5

## 2022-09-29 NOTE — ASSESSMENT & PLAN NOTE
· Acute encephalopathy secondary to gabapentin and morphine with subsequent renal failure, potentially component of psychiatric illness  · Likely a component of delirium as well given prolonged hospital stay  · Continue thiamine high-dose given potential concern for Wernicke's encephalopathy  · Holding both gabapentin and morphine since admission due to over-sedation  · LP and MRI negative for acute pathology  · Found to be positive for methicillin sensitive Staph aureus

## 2022-09-29 NOTE — PROGRESS NOTES
[facility]  Progress Note - Glorious Roles 1970, 45 y o  female MRN: [id number]  Unit/Bed#: [facility] 2 -01 Encounter: [id number]  Primary Care Provider: Rosey Palomo MD   Date and time admitted to hospital: 9/19/2022  8:04 AM    * Encephalopathy acute  Assessment & Plan  · Acute encephalopathy secondary to gabapentin and morphine with subsequent renal failure, potentially component of psychiatric illness  · Likely a component of delirium as well given prolonged hospital stay  · Continue thiamine high-dose given potential concern for Wernicke's encephalopathy  · Holding both gabapentin and morphine since admission due to over-sedation  · LP and MRI negative for acute pathology  · Found to be positive for methicillin sensitive Staph aureus      Bacteremia due to methicillin susceptible Staphylococcus aureus (MSSA)  Assessment & Plan  Patient with methicillin sensitive Staph aureus bacteremia  Echo with no vegetation  Will place CHERYLE referral per ID recommendations  Potentially may have been present on admission as the patient has multiple wounds on her legs from previous fall 4 days ago  In differential diagnosis includes septic thrombophlebitis  Continue Ancef, started on prophylaxis Vancomycin to prevent C diff infection  Infectious Disease consultation reviewed  Localized swelling of right upper extremity  Assessment & Plan  Evidence of streaking of the right upper extremity, although difficult to determine given ecchymosis  Blood cultures positive for methicillin sensitive Staph aureus    Leg edema, right  Assessment & Plan  · Likely source of rhabdomyolysis  No evidence of compartment syndrome fracture or DVT on imaging  · 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  · Orthopedic consulted, will need rehab at discharge     ·       D-dimer, elevated  Assessment & Plan  · Elevated D-dimer may be related to fall  · Lower extremity duplex negative for DVT   · Renal dysfunction cannot check CTA of chest but doubt PE  · Discontinued heparin infusion and transitioned to prophylaxis dose    Traumatic rhabdomyolysis [facility])  Assessment & Plan  · Secondary to fall last Friday  IV fluids per nephrology  · Patient had been on for for approximately 4 days prior to being brought to the emergency room for acute evaluation    Results from last 7 days   Lab Units 09/29/22  0513 09/27/22  0557 09/26/22  0454 09/25/22  0551 09/24/22  0548 09/23/22  0530   CK TOTAL U/L 624* 848* 990* 793* 905* 1,482*       Abnormal CT of the chest  Assessment & Plan  · Left-sided opacities noted  COVID negative  · Procalcitonin only minimally elevated without evidence of active infection          Transaminitis  Assessment & Plan  · Secondary to rhabdomyolysis  No evidence of liver injury on CT imaging  · Seen by GI  Hepatic duplex also negative  Results from last 7 days   Lab Units 09/25/22  0551 09/24/22  0548 09/23/22  0530   AST U/L 82* 111* 167*   ALT U/L 215* 240* 292*   TOTAL BILIRUBIN mg/dL 0 42 0 41 0 40       ARF (acute renal failure) (HCC)  Assessment & Plan  · ANUJA/ATN secondary to rhabdomyolysis  No evidence of renal obstruction on imaging  · Appreciate nephrology evaluation  Awaiting renal recovery  · No emergent need for hemodialysis, consent obtained from family members  · Renal function improving    Results from last 7 days   Lab Units 09/29/22  0513 09/28/22  0542 09/27/22  0557 09/26/22  0454 09/25/22  0551 09/24/22  0548 09/23/22  0824 09/23/22  0530 09/23/22  0132   BUN mg/dL 41* 48* 50* 53* 51* 52* 54* 52* 54*   CREATININE mg/dL 6 45* 6 94* 7 08* 7 63* 7 67* 7 55* 6 98* 7 15* 7 15*   EGFR ml/min/1 73sq m 6 6 6 5 5 5 6 5 5       DDD (degenerative disc disease), lumbosacral  Assessment & Plan  · Lumbar radiculopathy due for surgery  · Prior to admission was on gabapentin and morphine IR 15 mg   · Confirmed on     Holding both for now given kidney injury  · Monitor for gabapentin withdrawal    Tobacco abuse  Assessment & Plan  · Nicotine patch ordered    Depression  Assessment & Plan  · Continue venlafaxine and quetiapine- has been refusing these medications since admission, part of this due to lethargy, part of it due to NPO status   ·   · Does have history of suicide attempt a currently does not show any signs of thoughts of self-harm  · Potentially a psychiatric component of the patient's current encephalopathy  · Previously on Effexor, which patient has been refusing either spitting out medication or chewing  · Psychiatry consultation placed and patient case reviewed with on-call psychiatry  Placed on cervical 25 mg twice a day as mood stabilizer for current agitation  This may be further titrated to effect  · Discontinued Ativan for agitation and will place on Zyprexa 5 mg by mouth every 6 hours  [facility] Progress Note  Patient: Belkis Harrell 45 y o  female   MRN: [id number]  PCP: Ni Yanes MD  [facility] Encounter: [id number]  Date Of Visit: 09/29/22    Assessment:    Principal Problem:    Encephalopathy acute  Active Problems:    Depression    Tobacco abuse    DDD (degenerative disc disease), lumbosacral    ARF (acute renal failure) (HCC)    Transaminitis    Abnormal CT of the chest    Traumatic rhabdomyolysis (HCC)    D-dimer, elevated    Leg edema, right    Localized swelling of right upper extremity    Bacteremia due to methicillin susceptible Staphylococcus aureus (MSSA)      Plan:    · Psychiatry medication adjustment   · Patient has been receiving psychiatric meds although intermittently refusing to take these    Dosages adjusted, and will place dissolvable medication as well as IV medication as needed  · Cardiology consultation for CHERYLE  · Overall prognosis remains guarded  · Encephalopathy likely multifactorial- with delirium being high in the differential diagnosis       VTE Pharmacologic Prophylaxis:   Pharmacologic: Heparin  Mechanical VTE Prophylaxis in Place: Yes    Patient Centered Rounds: I have performed bedside rounds with nursing staff today  Discussions with Specialists or Other Care Team Provider:  Discussed with     Education and Discussions with Family / Patient:  Discussed with patient     Time Spent for Care: 45 minutes  More than 50% of total time spent on counseling and coordination of care as described above  Current Length of Stay: 10 day(s)    Current Patient Status: Inpatient   Certification Statement: The patient will continue to require additional inpatient hospital stay due to Encephalopathy, bacteremia    Discharge Plan / Estimated Discharge Date:  48-72 hours    Code Status: Level 1 - Full Code      Subjective:   Seen examined today, patient is significantly confused, she is oriented to person only  Moving all extremities  Discussed with  that patient has been intermittently off of her psychiatric medications initially her sedating medications including gabapentin had been held due to encephalopathy and confusion  Her psychiatric medications have been restarted but patient had been spitting these out and/or unsafe to swallow  A complete and comprehensive 14 point organ system review has been performed and all other systems are negative other than stated above  Objective:     Vitals:   Temp (24hrs), Av 7 °F (37 1 °C), Min:97 6 °F (36 4 °C), Max:99 8 °F (37 7 °C)    Temp:  [97 6 °F (36 4 °C)-99 8 °F (37 7 °C)] 97 6 °F (36 4 °C)  HR:  [108-118] 108  Resp:  [18-20] 18  BP: (149-161)/(105-109) 161/109  SpO2:  [92 %-93 %] 93 %  Body mass index is 24 13 kg/m²       Input and Output Summary (last 24 hours):     No intake or output data in the 24 hours ending 22 1528    Physical Exam:     General:  Appears chronically ill, confused  HEENT: atraumatic, PERRLA, moist mucosa, normal pharynx, normal tonsils and adenoids, normal tongue, no fluid in sinuses  Neck: Trachea midline, no carotid bruit, no masses  Respiratory: normal chest wall expansion, CTA B, no r/r/w, no rubs  Cardiovascular: RRR, no m/r/g, Normal S1 and S2  Abdomen: Soft, non-tender, non-distended, normal bowel sounds in all quadrants, no hepatosplenomegaly, no tympany  Rectal: deferred  Musculoskeletal:  Moves all  Integumentary: warm, dry, and pink, with no rash, purpura, or petechia  Heme/Lymph: no lymphadenopathy, no bruises  Neurological: Cranial Nerves II-XII grossly intact  Psychiatric:  Confused and delirious      Additional Data:     Labs:    Results from last 7 days   Lab Units 09/29/22  0513   WBC Thousand/uL 9 27   HEMOGLOBIN g/dL 9 7*   HEMATOCRIT % 30 4*   PLATELETS Thousands/uL 282   NEUTROS PCT % 75   LYMPHS PCT % 16   MONOS PCT % 7   EOS PCT % 1     Results from last 7 days   Lab Units 09/29/22  0513 09/26/22  0454 09/25/22  0551   POTASSIUM mmol/L 3 2*   < > 4 3   CHLORIDE mmol/L 103   < > 107   CO2 mmol/L 30   < > 20*   BUN mg/dL 41*   < > 51*   CREATININE mg/dL 6 45*   < > 7 67*   CALCIUM mg/dL 8 9   < > 8 4   ALK PHOS U/L  --   --  67   ALT U/L  --   --  215*   AST U/L  --   --  82*    < > = values in this interval not displayed  Results from last 7 days   Lab Units 09/23/22  0530   INR  1 03       * I Have Reviewed All Lab Data Listed Above  * Additional Pertinent Lab Tests Reviewed: [facility] Admission Reviewed    Imaging:    Imaging Reports Reviewed Today Include:  No new imaging  Imaging Personally Reviewed by Myself Includes:  No new imaging    Recent Cultures (last 7 days):     Results from last 7 days   Lab Units 09/28/22  1145 09/28/22  1144 09/26/22  1818 09/23/22  1608   BLOOD CULTURE  Received in Microbiology Lab  Culture in Progress  Received in Microbiology Lab  Culture in Progress   Staphylococcus aureus*  Staphylococcus aureus*  --    GRAM STAIN RESULT   --   --  Gram positive cocci in clusters*  Gram positive cocci in clusters* No Polys or Bacteria seen       Last 24 Hours Medication List:   Current Facility-Administered Medications   Medication Dose Route Frequency Provider Last Rate   • acetaminophen  650 mg Oral Q6H PRN Elizabeth Isprabhjot DO     • cefazolin  1,000 mg Intravenous Q24H Álvaro Knox PA-C 1,000 mg (09/28/22 1813)   • heparin (porcine)  5,000 Units Subcutaneous [facility] Atul Adan DO     • HYDROmorphone  0 5 mg Intravenous Q4H PRN Elizabeth Isprabhjot, DO     • LORazepam  1 mg Intravenous Once Elizabeth [employer], DO     • nicotine  1 patch Transdermal Daily Atul Adan DO     • OLANZapine  5 mg Oral Q6H PRN Muriel Branch DO     • ondansetron  4 mg Intravenous Q4H PRN Elizabeth Goode DO     • potassium chloride  20 mEq Intravenous Once Reagan Eisenberg MD 20 mEq (09/29/22 1502)   • QUEtiapine  25 mg Oral BID Muriel Branch DO     • sodium chloride  75 mL/hr Intravenous Continuous Reagan Eisenberg MD 75 mL/hr (09/29/22 0948)   • thiamine  500 mg Intravenous TID Muriel Branch  mg (09/29/22 1502)   • vancomycin  125 mg Oral Q12H [address] Sam Payne DO          Today, Patient Was Seen By: Muriel Branch    ** Please Note: This note was completed in part utilizing M-Modal Fluency Direct Software  Grammatical errors, random word insertions, spelling mistakes, and incomplete sentences may be an occasional consequence of this system secondary to software limitations, ambient noise, and hardware issues  If you have any questions or concerns about the content, text, or information contained within the body of this dictation, please contact the provider for clarification   **

## 2022-09-29 NOTE — PROGRESS NOTES
Progress Note - Orthopedics   Vidhya Pettit 45 y o  female MRN: [id number]  Unit/Bed#: [facility] 2 -01      Subjective:    45 y  o female mental status changes and right lower extremity edema with MSSA bacteremia  Patient is improving in regard to mental status but still cannot answer questions or follow commands appropriately  Patient was negative for DVT and is awaiting MRI evaluation which has been ordered but not performed        Labs:  0   Lab Value Date/Time    HCT 30 4 (L) 09/29/2022 0513    HCT 36 2 09/28/2022 0542    HCT 37 6 09/27/2022 0557    HGB 9 7 (L) 09/29/2022 0513    HGB 11 5 09/28/2022 0542    HGB 12 2 09/27/2022 0557    HGB 16 7 (H) 03/11/2014 0755    INR 1 03 09/23/2022 0530    WBC 9 27 09/29/2022 0513    WBC 8 58 09/28/2022 0542    WBC 11 66 (H) 09/27/2022 0557     Meds:    Current Facility-Administered Medications:   •  acetaminophen (TYLENOL) tablet 650 mg, 650 mg, Oral, Q6H PRN, Atul Adan DO, 650 mg at 09/22/22 1040  •  ceFAZolin (ANCEF) IVPB (premix in dextrose) 1,000 mg 50 mL, 1,000 mg, Intravenous, Q24H, Álvaro Knox PA-C, Last Rate: 100 mL/hr at 09/28/22 1813, 1,000 mg at 09/28/22 1813  •  gabapentin (NEURONTIN) oral solution 100 mg, 100 mg, Oral, TID, Sam Leong DO  •  heparin (porcine) subcutaneous injection 5,000 Units, 5,000 Units, Subcutaneous, Q8H [facility], Atul Adan DO, 5,000 Units at 09/29/22 1414  •  HYDROmorphone (DILAUDID) injection 0 5 mg, 0 5 mg, Intravenous, Q4H PRN, Ronald Bynum DO, 0 5 mg at 09/27/22 1116  •  LORazepam (ATIVAN) injection 1 mg, 1 mg, Intravenous, Once, Atul Loco, DO  •  nicotine (NICODERM CQ) 14 mg/24hr TD 24 hr patch 1 patch, 1 patch, Transdermal, Daily, Atul Adan DO, 1 patch at 09/28/22 1574  •  OLANZapine (ZyPREXA ZYDIS) dispersible tablet 5 mg, 5 mg, Oral, Q6H PRN, Madhu Conti DO  •  ondansetron [facility] injection 4 mg, 4 mg, Intravenous, Q4H PRN, Michelle Adan, DO  •  potassium chloride 20 mEq IVPB (premix), 20 mEq, Intravenous, Once, Eri Castillo MD, Last Rate: 50 mL/hr at 09/29/22 1502, 20 mEq at 09/29/22 1502  •  QUEtiapine (SEROquel) tablet 25 mg, 25 mg, Oral, BID, Sam Ardon,   •  sodium chloride infusion 0 45 %, 75 mL/hr, Intravenous, Continuous, Mk Graf MD, Last Rate: 75 mL/hr at 09/29/22 0948, 75 mL/hr at 09/29/22 0948  •  thiamine (VITAMIN B1) 500 mg in sodium chloride 0 9 % 50 mL IVPB, 500 mg, Intravenous, TID, Newclarke Seal, DO, Last Rate: 100 mL/hr at 09/29/22 1502, 500 mg at 09/29/22 1502  •  vancomycin (VANCOCIN) oral solution 125 mg, 125 mg, Oral, Q12H [facility], Sam Ardon DO    Blood Culture:   Lab Results   Component Value Date    BLOODCX Received in Microbiology Lab  Culture in Progress  09/28/2022     Wound Culture:   No results found for: WOUNDCULT    Ins and Outs:  No intake/output data recorded  Physical:  Vitals:    09/29/22 0708   BP: (!) 161/109   Pulse: (!) 108   Resp: 18   Temp: 97 6 °F (36 4 °C)   SpO2: 93%     Musculoskeletal: right Lower Extremity  · In comparison to my last evaluation on 09/27/22, the patient is edema has improved significantly  Still mildly pitting edema  She does states she can feel palpation in the lower leg and foot at this time  All compartments of the leg are soft and compressible at this time  No notable erythema throughout the leg  · Skin 1 5 cm round dry scabbed abrasion anterior right knee without peripheral erythema, discharge, nor lymphangitic streaking suggest any infection  · Patient does not follow commands for movement at this time  She does have stiffness in the right foot which there was concern for drop foot in the past though she is able to wiggle her toes  Assessment:    45 y  o female with encephalopathy, MSSA bacteremia, and right lower extremity swelling/pitting edema which is improving without obvious septic joint nor DVT       Plan:  · WBAT  · With improvement in lower extremity edema we are not pursuing MRI study of the right lower extremity further  Causes unlikely to be orthopedic in nature  Previous attempted an MRI study of her brain was unsuccessful due to the patient's inability to stay still  · DVT ppx per primary service and antibiotics per ID  · Ortho signing off at this time      Monik Hoffman PA-C

## 2022-09-29 NOTE — PROGRESS NOTES
Progress Note - [facility] Infectious Disease   Daniel Dates 45 y o  female MRN: [id number]  Unit/Bed#: [place] [address] -01 Encounter: [id number]      IMPRESSION & RECOMMENDATIONS:   1  SIRS vs Sepsis, not present on admission, a/e/b fever, tachycardia  Mild leukocytosis   Suspect this is likely due to Gram-positive bacteremia   T-max 100 9° F and now defervesced  No lactic acidosis   COVID-19 negative   UA benign   Procalcitonin mildly elevated 0 31   Status post lumbar puncture for altered mental status with negative ME panel    -antibiotics as below   -follow-up cultures and adjust antibiotics as needed  -monitor temperature and hemodynamics  -recheck CBC and BMP in a m      2  MSSA bacteremia  2 of 2 admission blood cultures postive for MSSA  Patient was found down for prolonged period of time with evidence of multiple wounds on admission  Suspect cutaneous vs endovascular source, likely phlebitis as there is report of RUE erythema surrounding prior IV in addition to palpable cord in left antecub  TTE negative for obvious vegetation  Repeat blood cultures x 2 sets pending    -continue IV cefazolin renally dosed 1 g Q 24  -follow up repeat blood cultures to confirm clearance of bacteremia   -if bacteremia clears quickly, would recommend CHERYLE and if negative anticipate treatment with IV abx for 2 weeks      3  Toxic Metabolic encephalopathy   Likely multifactorial etiology with uremia playing a role   This was initially thought to be secondary to gabapentin and morphine use in the setting of renal failure, hypotension, rhabdomyolysis   MRI brain negative   Lumbar puncture with opening pressure 26 with negative ME panel  CSF fluid culture not collected  Ongoing workup for autoimmune encephalitis       4  Acute renal failure   Creatinine on admission 5 88 with CK over 32,000  Creatinine reached plateau, now down trending 6 9 today   Likely multifactorial etiology in the setting of rhabdomyolysis and dehydration  Likely AUNJA/ATN  Estimated Creatinine Clearance: 9 3 mL/min (A) (by C-G formula based on SCr of 6 45 mg/dL (H))  -renally dose antibiotics as needed  -close Nephrology follow-up  -recheck BMP daily     5  Transaminitis   This is likely secondary to rhabdo   LFTs and liver enzymes down trending   No signs of liver damage on imaging   Ammonia level within normal limits   Acute hepatitis panel is negative      6  Right lower extremity swelling and pain   Venous duplex negative   MRI ordered  Naty Stephenson following      7  Tobacco abuse   Encourage cessation as recommended by primary team      8  Antibiotic allergy  Hx of hives with PCNs  Tolerated ceftriaxone yesterday  Monitor for MUSA  Antibiotics:  Day 3 IV cefazolin    I have discussed the above management plan in detail with patient  I have discussed the above management plan in detail with patient's RN, and the primary service, MÓNICA Nur  Subjective:  Minimally verbal but more active today and starting to verbalize needs  Shouting "bathroom" as I entered room  Shakes head "no" when asked about fevers, chills, or pain  Objective:  Vitals:  Temp:  [97 4 °F (36 3 °C)-99 8 °F (37 7 °C)] 97 6 °F (36 4 °C)  HR:  [105-118] 108  Resp:  [18-20] 18  BP: (149-161)/() 161/109  SpO2:  [92 %-96 %] 93 %  Temp (24hrs), Av 3 °F (36 8 °C), Min:97 4 °F (36 3 °C), Max:99 8 °F (37 7 °C)  Current: Temperature: 97 6 °F (36 4 °C)    PHYSICAL EXAM:  General Appearance:  Appearing more interactive and alert, and otherwise in no distress   HEENT: Normocephalic, without obvious abnormality, atraumatic  Conjunctiva pink and sclera anicteric  Oropharynx moist without lesions  Suboptimal dentition  Lungs:   Clear to auscultation bilaterally, respirations unlabored   Heart:  RRR; no murmur, rub or gallop   Abdomen:   Soft, non-tender, non-distended, positive bowel sounds    Extremities: No cyanosis, clubbing  RLE not pitting edema      Musculoskeletal: Back symmetric without curvature, ROM normal     : No CVA or suprapubic tenderness  No abel  Skin: No rashes or lesions  No draining wounds noted  B/l antecubs with palpable cord at prior IV sites  No pain with palpation  Scattered wound noted at various stages of healing  B/l UE ecchymoses noted  Peripheral IV intact without evidence of erythema, warmth, or exudate  LABS, IMAGING, & OTHER STUDIES:  Lab Results:  I have personally reviewed pertinent labs  Results from last 7 days   Lab Units 09/29/22  0513 09/28/22  0542 09/27/22  0557   WBC Thousand/uL 9 27 8 58 11 66*   HEMOGLOBIN g/dL 9 7* 11 5 12 2   PLATELETS Thousands/uL 282 293 308     Results from last 7 days   Lab Units 09/29/22  0513 09/28/22  0542 09/27/22  0557 09/26/22  0454 09/25/22  0551 09/24/22  0548 09/23/22  0824 09/23/22  0530   SODIUM mmol/L 145 144 140   < > 143 140   < > 138   POTASSIUM mmol/L 3 2* 3 3* 4 0   < > 4 3 4 6   < > 4 7   CHLORIDE mmol/L 103 103 100   < > 107 108   < > 104   CO2 mmol/L 30 29 24   < > 20* 18*   < > 20*   BUN mg/dL 41* 48* 50*   < > 51* 52*   < > 52*   CREATININE mg/dL 6 45* 6 94* 7 08*   < > 7 67* 7 55*   < > 7 15*   EGFR ml/min/1 73sq m 6 6 6   < > 5 5   < > 5   CALCIUM mg/dL 8 9 8 9 8 8   < > 8 4 8 2*   < > 8 1*   AST U/L  --   --   --   --  82* 111*  --  167*   ALT U/L  --   --   --   --  215* 240*  --  292*   ALK PHOS U/L  --   --   --   --  67 67  --  69    < > = values in this interval not displayed  Results from last 7 days   Lab Units 09/28/22  1145 09/28/22  1144 09/26/22  1818 09/23/22  1608   BLOOD CULTURE  Received in Microbiology Lab  Culture in Progress  Received in Microbiology Lab  Culture in Progress   Staphylococcus aureus*  Staphylococcus aureus*  --    GRAM STAIN RESULT   --   --  Gram positive cocci in clusters*  Gram positive cocci in clusters* No Polys or Bacteria seen

## 2022-09-29 NOTE — PLAN OF CARE
Problem: INFECTION - ADULT  Goal: Absence or prevention of progression during hospitalization  Description: INTERVENTIONS:  - Assess and monitor for signs and symptoms of infection  - Monitor lab/diagnostic results  - Monitor all insertion sites, i e  indwelling lines, tubes, and drains  - Monitor endotracheal if appropriate and nasal secretions for changes in amount and color  - [place] appropriate cooling/warming therapies per order  - Administer medications as ordered  - Instruct and encourage patient and family to use good hand hygiene technique  - Identify and instruct in appropriate isolation precautions for identified infection/condition  Outcome: Progressing  Goal: Absence of fever/infection during neutropenic period  Description: INTERVENTIONS:  - Monitor WBC    Outcome: Progressing     Problem: SAFETY ADULT  Goal: Patient will remain free of falls  Description: INTERVENTIONS:  - Educate patient/family on patient safety including physical limitations  - Instruct patient to call for assistance with activity   - Consult OT/PT to assist with strengthening/mobility   - Keep Call bell within reach  - Keep bed low and locked with side rails adjusted as appropriate  - Keep care items and personal belongings within reach  - Initiate and maintain comfort rounds  - Make Fall Risk Sign visible to staff  - Offer Toileting every 2 Hours, in advance of need  - Initiate/Maintain   alarm  - Obtain necessary fall risk management equipment:     - Apply yellow socks and bracelet for high fall risk patients  - Consider moving patient to room near nurses station  Outcome: Progressing  Goal: Maintain or return to baseline ADL function  Description: INTERVENTIONS:  -  Assess patient's ability to carry out ADLs; assess patient's baseline for ADL function and identify physical deficits which impact ability to perform ADLs (bathing, care of mouth/teeth, toileting, grooming, dressing, etc )  - Assess/evaluate cause of self-care deficits   - Assess range of motion  - Assess patient's mobility; develop plan if impaired  - Assess patient's need for assistive devices and provide as appropriate  - Encourage maximum independence but intervene and supervise when necessary  - Involve family in performance of ADLs  - Assess for home care needs following discharge   - Consider OT consult to assist with ADL evaluation and planning for discharge  - Provide patient education as appropriate  Outcome: Progressing  Goal: Maintains/Returns to pre admission functional level  Description: INTERVENTIONS:  - Perform BMAT or MOVE assessment daily    - Set and communicate daily mobility goal to care team and patient/family/caregiver  - Collaborate with rehabilitation services on mobility goals if consulted  - Perform Range of Motion 3 times a day  - Reposition patient every 2 hours  - Dangle patient 3 times a day  - Stand patient 3 times a day  - Ambulate patient 3 times a day  - Out of bed to chair 3 times a day   - Out of bed for meals 3 times a day  - Out of bed for toileting  - Record patient progress and toleration of activity level   Outcome: Progressing     Problem: Knowledge Deficit  Goal: Patient/family/caregiver demonstrates understanding of disease process, treatment plan, medications, and discharge instructions  Description: Complete learning assessment and assess knowledge base    Interventions:  - Provide teaching at level of understanding  - Provide teaching via preferred learning methods  Outcome: Progressing

## 2022-09-29 NOTE — ASSESSMENT & PLAN NOTE
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

## 2022-09-29 NOTE — ASSESSMENT & PLAN NOTE
Patient with methicillin sensitive Staph aureus bacteremia  Echo with no vegetation  Will place CHERYLE referral per ID recommendations  Potentially may have been present on admission as the patient has multiple wounds on her legs from previous fall 4 days ago  In differential diagnosis includes septic thrombophlebitis  Continue Ancef, started on prophylaxis Vancomycin to prevent C diff infection  Infectious Disease consultation reviewed

## 2022-09-29 NOTE — CONSULTS
Cardiology Consultation  MD Tricia Reddy MD Twanda Crown, DO, DALTON Boyd MD Dorien Plummer, DO, Maisie Closs, DO, [facility]  ----------------------------------------------------------------  [address], [address] [facility] Dates 45 y o  female MRN: [id number]  Unit/Bed#: [place] [address] 217[address]01 Encounter: [id number]      09/29/22    Referring Physician: Izzy Tripp DO    Chief Complain/Reason for Referal:  MSSA bacteremia    IMPRESSION:  1  MSSA bacteremia 2/2 blood cultures positive  2  Toxic metabolic encephalopathy  3  Acute renal failure  4  Rhabdomyolysis  5  Depression      DISCUSSION/RECOMMENDATIONS:  • Due to her MSSA bacteremia, cardiology has been consulted to perform a transesophageal echocardiogram  • Transthoracic echo does not suggest underlying infective endocarditis  There is mild valvular regurgitation/insufficiency of the mitral, tricuspid, pulmonic valve, without evidence of valvular destruction  There is a normal ejection fraction  • However, given her bacteremia with an organism known to cause infective endocarditis, will plan for further evaluation with transesophageal echo  Her  will need to consent to the procedure as she is still encephalopathic and restrained  Will tentatively keep npo s' midnight in the event this could be added on for tomorrow, but this is not definite       Jeremi Brownlee DO, [facility]    ----------------------------------------------------  EKG:Sinus tachycardia  Rightward axis  Borderline ECG    ECHO: Transthoracic echo was completed on 09/27 which showed normal EF 63%, top-normal right atrial cavity size, trace MR, trace TR, trace PI, no evidence of endocarditis on transthoracic echo      ======================================================    HPI:  I am seeing this patient in cardiology consultation for:  MSSA bacteremia    Daniel Elise is a 45 y o  female with:   • Depression, tobacco abuse, degenerative disc disease who initially presented to the hospital with altered mental status and ambulatory dysfunction  She has recently switched to morphine IR in anticipation for upcoming surgery on her spine next month but due to progressive lethargy with decreased p o  intake she had a fall  She complained of right leg pain afterwards as well as a mentor dysfunction  In the emergency department she was found to have acute renal failure as well as transaminitis and rhabdomyolysis  She had a CT of the chest that showed left-sided opacities but COVID-19 was negative, she had a minimally elevated procalcitonin  Her mood has been labile and she has persistent confusion with extra pyramidal movements  Neurology was consulted and EEG, lumbar puncture MRI of the brain were completed  Due to her right leg pain, orthopedics was consulted, no evidence of compartment syndrome and venous Dopplers were negative for DVT  Blood cultures were obtained which were positive for MSSA with both sets  Transthoracic echo was completed on 09/27 which showed normal EF 63%, top-normal right atrial cavity size, trace MR, trace TR, trace PI, no evidence of endocarditis on transthoracic echo  She gives none of her own history currently as she is encephalopathic and restrained      Past Medical History:   Diagnosis Date   • Chronic pain disorder    • Depression    • GERD (gastroesophageal reflux disease)    • History of electroconvulsive therapy    • Low back pain    • Self-injurious behavior    • [facility]          Scheduled Meds:  Current Facility-Administered Medications   Medication Dose Route Frequency Provider Last Rate   • acetaminophen  650 mg Oral Q6H PRN Jesus Giles DO     • cefazolin  1,000 mg Intravenous Q24H Álvaro Knox PA-C 1,000 mg (09/28/22 1813)   • gabapentin  100 mg Oral TID Valentina Ward DO     • heparin (porcine)  5,000 Units Subcutaneous Q8H [address] Atul Adan, DO     • HYDROmorphone  0 5 mg Intravenous Q4H PRN Jesus Giles DO     • LORazepam  1 mg Intravenous Once Jesus Giles DO     • nicotine  1 patch Transdermal Daily Atul Adan, DO     • OLANZapine  5 mg Oral Q6H PRN Valentina Ward DO     • ondansetron  4 mg Intravenous Q4H PRN Jesus Giles DO     • potassium chloride  20 mEq Intravenous Once Tosin Whitlock MD 20 mEq (09/29/22 1502)   • QUEtiapine  25 mg Oral BID Valentina Ward DO     • sodium chloride  75 mL/hr Intravenous Continuous Tosin Whitlock MD 75 mL/hr (09/29/22 0948)   • thiamine  500 mg Intravenous TID Valentina Ward  mg (09/29/22 1502)   • vancomycin  125 mg Oral Q12H [address] Sam Ford DO       Continuous Infusions:sodium chloride, 75 mL/hr, Last Rate: 75 mL/hr (09/29/22 0948)      PRN Meds: •  acetaminophen  •  HYDROmorphone  •  OLANZapine  •  ondansetron  Allergies   Allergen Reactions   • Chantix [Varenicline]    • Ibuprofen Other (See Comments)     Upset stomach   • Lyrica [Pregabalin] Other (See Comments)     bruising   • Penicillins Other (See Comments)     ? hives   • Sulfa Antibiotics Other (See Comments)     sloughing skin in mouth   • Sulfasalazine      I reviewed the Home Medication list in the chart       Family History   Problem Relation Age of Onset   • Arthritis Mother    • Coronary artery disease Mother         MI in her 63's   • Parkinsonism Father         with falls and SDH   • Parkinsonism Paternal Grandmother    • Coronary artery disease Paternal Grandfather    • Parkinsonism Paternal Aunt    • Colon cancer Family    • Depression Neg Hx        Social History     Socioeconomic History   • Marital status: /Civil Union     Spouse name: Not on file   • Number of children: Not on file   • Years of education: Not on file   • Highest education level: Not on file   Occupational History   • Occupation: disability   Tobacco Use   • Smoking status: Current Every Day Smoker Packs/day: 0 50     Years: 35 00     Pack years: 17 50     Types: Cigarettes   • Smokeless tobacco: Never Used   Vaping Use   • Vaping Use: Never used   Substance and Sexual Activity   • Alcohol use: Yes     Comment: "occasional glass of wine"   • Drug use: Not Currently     Types: Marijuana, Cocaine     Comment: medical   • Sexual activity: Yes     Partners: Male     Birth control/protection: Post-menopausal     Comment: PT is    Other Topics Concern   • Not on file   Social History Narrative    Lives with spouse     Social Determinants of Health     Financial Resource Strain: Not on file   Food Insecurity: No Food Insecurity   • Worried About Running Out of Food in the Last Year: Never true   • Ran Out of Food in the Last Year: Never true   Transportation Needs: No Transportation Needs   • Lack of Transportation (Medical): No   • Lack of Transportation (Non-Medical): No   Physical Activity: Not on file   Stress: Not on file   Social Connections: Not on file   Intimate Partner Violence: Not on file   Housing Stability: Low Risk    • Unable to Pay for Housing in the Last Year: No   • Number of Places Lived in the Last Year: 1   • Unstable Housing in the Last Year: No       Review of Systems   Review of Systems   Unable to perform ROS: Mental status change      Vitals:    09/29/22 0708   BP: (!) 161/109   Pulse: (!) 108   Resp: 18   Temp: 97 6 °F (36 4 °C)   SpO2: 93%     I/O       09/27 0701 09/28 0700 09/28 0701 09/29 0700 09/29 0701 09/30 0700    I V  (mL/kg) 970 (14 3)      Total Intake(mL/kg) 970 (14 3)      Net +970                 Weight (last 2 days)     Date/Time Weight    09/29/22 0600 67 8 (149 47)    09/28/22 0600 67 8 (149 47)    09/27/22 15:06:09 67 6 (149)    09/27/22 0600 67 9 (149 69)          Physical Exam  Constitutional: awake, encephalopathic, confused  Head: Normocephalic, without obvious abnormality, atraumatic  Eyes: conjunctivae clear and moist  Sclera anicteric  No xanthelasmas  Pupils equal bilaterally  Extraocular motions are full  Ear nose mouth and throat: ears are symmetrical bilaterally, hearing appears to be equal bilaterally, no nasal discharge or epistaxis, oropharynx is clear with moist mucous membranes  Neck:  Trachea is midline, neck is supple, no thyromegaly or significant lymphadenopathy, there is full range of motion    Lungs: clear to auscultation bilaterally, no wheezes, no rales, no rhonchi, no accessory muscle use, breathing is nonlabored  Heart: regular rate and rhythm, S1, S2 normal, no murmur, no click, no rub and no gallop, no lower extremity edema  Abdomen: soft, non-tender; bowel sounds normal; no masses,  no organomegaly  Psychiatric:  She is restrained in bed at this time, she does not give any of her own history  Skin:  She has wounds on her legs    Results from last 7 days   Lab Units 09/29/22  0513 09/28/22  0542 09/27/22  0557   WBC Thousand/uL 9 27 8 58 11 66*   HEMOGLOBIN g/dL 9 7* 11 5 12 2   HEMATOCRIT % 30 4* 36 2 37 6   PLATELETS Thousands/uL 282 293 308   NEUTROS PCT % 75 78* 88*   MONOS PCT % 7 7 4     Results from last 7 days   Lab Units 09/29/22  0513 09/28/22  0542 09/27/22  0557   POTASSIUM mmol/L 3 2* 3 3* 4 0   CHLORIDE mmol/L 103 103 100   CO2 mmol/L 30 29 24   BUN mg/dL 41* 48* 50*   CREATININE mg/dL 6 45* 6 94* 7 08*   CALCIUM mg/dL 8 9 8 9 8 8     Results from last 7 days   Lab Units 09/29/22  0513 09/28/22  0542 09/27/22  0557 09/26/22  0454 09/25/22  0551 09/24/22  0548 09/23/22  0824 09/23/22  0530   POTASSIUM mmol/L 3 2* 3 3* 4 0   < > 4 3 4 6   < > 4 7   CHLORIDE mmol/L 103 103 100   < > 107 108   < > 104   CO2 mmol/L 30 29 24   < > 20* 18*   < > 20*   BUN mg/dL 41* 48* 50*   < > 51* 52*   < > 52*   CREATININE mg/dL 6 45* 6 94* 7 08*   < > 7 67* 7 55*   < > 7 15*   CALCIUM mg/dL 8 9 8 9 8 8   < > 8 4 8 2*   < > 8 1*   ALK PHOS U/L  --   --   --   --  67 67  --  69   ALT U/L  --   --   --   --  215* 240*  --  292*   AST U/L  --   --   -- --  82* 111*  --  167*    < > = values in this interval not displayed  No results found for: Ed Lester      Results from last 7 days   Lab Units 09/29/22  0513   CK TOTAL U/L 624*   CK MB INDEX % 13 8*         Results from last 7 days   Lab Units 09/23/22  0530   INR  1 03               I have personally reviewed the EKG, CXR and Telemetry images directly  Patient Active Problem List    Diagnosis Date Noted   • Bacteremia due to methicillin susceptible Staphylococcus aureus (MSSA) 09/27/2022   • Localized swelling of right upper extremity 09/26/2022   • Leg edema, right 09/23/2022   • D-dimer, elevated 09/22/2022   • ARF (acute renal failure) ([facility] [address] ) 09/19/2022   • Transaminitis 09/19/2022   • Encephalopathy acute 09/19/2022   • Abnormal CT of the chest 09/19/2022   • Traumatic rhabdomyolysis ([address] ) 09/19/2022   • Hyperglycemia 07/14/2022   • Opioid dependence ([address] ) 12/09/2021   • Chronic right shoulder pain 03/03/2021   • Internal hemorrhoids 05/18/2020   • Adnexal cyst 05/18/2020   • Ischemic colitis ([facility] [address] ) 05/13/2020   • Intercostal neuralgia    • Hematochezia 05/29/2019   • Insomnia 05/20/2019   • History of rib fracture 10/28/2018   • Tobacco abuse 10/28/2018   • Right knee pain 05/25/2018   • Generalized anxiety disorder 05/24/2018   • Hypertension    • Hyperlipidemia    • Depression    • COPD (chronic obstructive pulmonary disease) ([facility])    • Chondromalacia patellae 04/18/2018   • Irritable bowel syndrome with diarrhea 01/25/2018   • DDD (degenerative disc disease), lumbosacral 04/07/2014       Portions of the record may have been created with voice recognition software  Occasional wrong word or "sound a like" substitutions may have occurred due to the inherent limitations of voice recognition software  Read the chart carefully and recognize, using context, where substitutions have occurred      Mohit Cleveland DO, [facility]  9/29/2022 4:22 PM

## 2022-09-29 NOTE — TELEMEDICINE
TeleConsultation - [address] [facility] 45 y o  female MRN: [id number]  Unit/Bed#: [place] [address] -01 Encounter: [id number]        REQUIRED DOCUMENTATION:     1  This service was provi ded via Telemedicine  2  Provider located at [place]  3  TeleMed provider: Rick Lynch MD   4  Identify all parties in room with patient during tele consult:  Patient  5  Patient was then informed that this was a Telemedicine visit and that the exam was being conducted confidentially over secure lines  My office door was closed  No one else was in the room  Patient acknowledged consent and understanding of privacy and security of the Telemedicine visit, and gave us permission to have the assistant stay in the room in order to assist with the history and to conduct the exam   I informed the patient that I have reviewed their record in Epic and presented the opportunity for them to ask any questions regarding the visit today  The patient agreed to participate  Assessment/Plan     Principal Problem:    Encephalopathy acute  Active Problems:    Depression    Tobacco abuse    DDD (degenerative disc disease), lumbosacral    ARF (acute renal failure) (HCC)    Transaminitis    Abnormal CT of the chest    Traumatic rhabdomyolysis (HCC)    D-dimer, elevated    Leg edema, right    Localized swelling of right upper extremity    Bacteremia due to methicillin susceptible Staphylococcus aureus (MSSA)    Assessment:  Metabolic encephalopathy/delirium likely secondary to multiple underlying factors; history of generalized anxiety disorder    Treatment Plan:  Recommend to consider increasing Seroquel to 25 mg p o  twice daily as mood stabilizer for current agitation  This may be further titrated to effect if indicated as tolerated  May also consider Zyprexa 5 mg p o  or IM q 6 hours p r n  agitation  Would attempt to avoid benzodiazepines if possible as these may further exacerbate encephalopathy    Re-consult Psychiatry as needed  Current Medications:     Current Facility-Administered Medications   Medication Dose Route Frequency Provider Last Rate   • acetaminophen  650 mg Oral Q6H PRN Santiago Espinoza DO     • cefazolin  1,000 mg Intravenous Q24H Álvaro Knox PA-C 1,000 mg (09/28/22 1813)   • heparin (porcine)  5,000 Units Subcutaneous [facility] Atul Adan DO     • HYDROmorphone  0 5 mg Intravenous Q4H PRN Santiago Espinoza DO     • LORazepam  0 5 mg Intravenous Q6H PRN Marguerite Hansen DO     • LORazepam  1 mg Intravenous Once Santiago Espinoza DO     • nicotine  1 patch Transdermal Daily Atul Adan DO     • ondansetron  4 mg Intravenous Q4H PRN Santiago Espinoza DO     • potassium chloride  20 mEq Intravenous Once Chitra Larson MD     • QUEtiapine  25 mg Oral HS Atul Adan DO     • sodium chloride  75 mL/hr Intravenous Continuous Chitra Larson MD 75 mL/hr (09/29/22 0948)   • thiamine  500 mg Intravenous TID Marguerite Hansen  mg (09/29/22 0948)   • vancomycin  125 mg Oral Q6H [facility] d[facility]     • venlafaxine  150 mg Oral Daily Santiago Espinoza DO         Risks / Benefits of Treatment:    Patient does not verbalize understanding at this time and will require further explanation  Inpatient consult to Psychiatry  Consult performed by: Kirstie Silverio MD  Consult ordered by: Marguerite Hansen DO        Physician Requesting Consult: Marguerite Hansen DO  Principal Problem:Encephalopathy acute    Reason for Consult:  Generalized anxiety disorder      History of Present Illness      Patient is a 45 y o  female who presented to the emergency department with provider documented the following:  “  Altered Mental Status        Pt arrives via ems from home  called ems because pt is is confused this morning  Pt is alert to self and place  But can not answer any other questions  Pt did have a fall 4 days ago   Pt denies drugs and alcohol use Pt denies cp and sob       57-year-old female with a past medical history of IBS, hypertension, hyperlipidemia, depression, COPD, anxiety, GERD, and opioid abuse presents with altered mental status   called EMS because he found patient confused this morning  Apparently, she had a fall and hit her head 4 days ago  Patient had been acting normally afterwards and was not evaluated  Patient is oriented to person and place, but cannot answer any other questions  She will answer yes and no to some questions  Patient denies chest pain and shortness of breath  She states that she is having pain in her right ankle, which is swollen  She states that this swelling is new  Patient denies taking any recreational drugs  She denies taking more than her prescribed doses of her home medications               Prior to Admission Medications   Prescriptions Last Dose Informant Patient Reported? Taking?    Nucynta 50 MG tablet   Self Yes No   Sig: Take 50 mg by mouth 3 (three) times a day as needed   Patient not taking: Reported on 9/7/2022   QUEtiapine (SEROquel) 50 mg tablet 9/18/2022 at Unknown time   No Yes   Sig: Take half or whole table by mouth daily at bedtime   Patient taking differently: Take whole table by mouth daily at bedtime   diclofenac (VOLTAREN) 75 mg EC tablet     Yes No   Sig: Take 75 mg by mouth 2 (two) times a day   Patient not taking: No sig reported   gabapentin (NEURONTIN) 300 mg capsule 9/18/2022 at Unknown time Self Yes Yes   Sig: Take 300 mg by mouth 4 (four) times a day   methocarbamol (ROBAXIN) 500 mg tablet 9/18/2022 at Unknown time Self No Yes   Sig: Take 1 tablet (500 mg total) by mouth 2 (two) times a day as needed for muscle spasms   nicotine (NICODERM CQ) 21 mg/24 hr TD 24 hr patch     No No   Sig: Place 1 patch on the skin every 24 hours   Patient not taking: Reported on 9/12/2022   omeprazole (PriLOSEC) 40 MG capsule 9/18/2022 at Unknown time   No Yes   Sig: TAKE ONE CAPSULE BY MOUTH EVERY DAY IN THE MORNING 30 MINUTES PRIOR TO EATING AS NEEDED   venlafaxine (EFFEXOR-XR) 150 mg 24 hr capsule 2022 at Unknown time Self No Yes   Sig: Take 1 capsule (150 mg total) by mouth in the morning  venlafaxine (EFFEXOR-XR) 37 5 mg 24 hr capsule 2022 at Unknown time   No Yes   Sig: TAKE ONE CAPSULE BY MOUTH EVERY MORNING      Facility-Administered Medications: None         Medical History[]Expand by Default        Past Medical History:   Diagnosis Date   • Chronic pain disorder     • Depression     • GERD (gastroesophageal reflux disease)     • History of electroconvulsive therapy     • Low back pain     • Self-injurious behavior     • Suicide attempt ([facility] [address] )              Surgical History[]Expand by Default         Past Surgical History:   Procedure Laterality Date   •  SECTION       • COLONOSCOPY       • PANCREAS SURGERY         "pseudocysts" per patient's  Ricki Infante   • NE ESOPHAGOGASTRODUODENOSCOPY TRANSORAL DIAGNOSTIC N/A 4/10/2018     Procedure: EGD AND COLONOSCOPY;  Surgeon: Faustino Rodriguez MD;  [place] [facility];   Service: Gastroenterology            Family History[]Expand by Default         Family History   Problem Relation Age of Onset   • Arthritis Mother     • Coronary artery disease Mother           MI in her 63's   • Parkinsonism Father           with falls and SDH   • Parkinsonism Paternal Grandmother     • Coronary artery disease Paternal Grandfather     • Parkinsonism Paternal Aunt     • Colon cancer Family     • Depression Neg Hx           I have reviewed and agree with the history as documented            E-Cigarette/Vaping   • E-Cigarette Use Never User              E-Cigarette/Vaping Substances   • Nicotine No     • THC No     • CBD No     • Flavoring No     • Other No     • Unknown No        Social History            Tobacco Use   • Smoking status: Current Every Day Smoker       Packs/day: 0 50       Years: 35 00       Pack years: 17 50       Types: Cigarettes   • Smokeless tobacco: Never Used   Vaping Use   • Vaping Use: Never used   Substance Use Topics   • Alcohol use: Yes       Comment: "occasional glass of wine"   • Drug use: Not Currently       Types: Marijuana, Cocaine       Comment: medical         Review of Systems   Unable to perform ROS: Mental status change         Past Medical History:   Diagnosis Date   • Chronic pain disorder    • Depression    • GERD (gastroesophageal reflux disease)    • History of electroconvulsive therapy    • Low back pain    • Self-injurious behavior    • Suicide attempt [facility])        Medical Review Of Systems:    Review of Systems    Meds/Allergies     all current active meds have been reviewed  Allergies   Allergen Reactions   • Chantix [Varenicline]    • Ibuprofen Other (See Comments)     Upset stomach   • Lyrica [Pregabalin] Other (See Comments)     bruising   • Penicillins Other (See Comments)     ? hives   • Sulfa Antibiotics Other (See Comments)     sloughing skin in mouth   • Sulfasalazine        Objective     Vital signs in last 24 hours:  Temp:  [97 4 °F (36 3 °C)-99 8 °F (37 7 °C)] 97 6 °F (36 4 °C)  HR:  [105-118] 108  Resp:  [18-20] 18  BP: (149-161)/() 161/109    No intake or output data in the 24 hours ending 09/29/22 1431    Mental status examination:  The patient was alert and in bed with soft wrist restraints in place due to her psychomotor agitation  She did not respond to my questions  She did not make eye contact  She was constantly moving about in the bed  Staff reports that the patient has been oriented to person but this time she gave no response any orientation questions nor did she show any response to my questions  Content capacity, insight and judgment are gravely impaired at this time  Lab Results: I have personally reviewed all pertinent laboratory/tests results  Imaging Studies: CT chest abdomen pelvis wo contrast    Result Date: 9/19/2022  Narrative: CT CHEST, ABDOMEN AND PELVIS WITHOUT IV CONTRAST INDICATION:   ARF, liver injury  COMPARISON:  None  TECHNIQUE: CT examination of the chest, abdomen and pelvis was performed without intravenous contrast  Axial, sagittal, and coronal 2D reformatted images were created from the source data and submitted for interpretation  Radiation dose length product (DLP) for this visit:  563 mGy-cm   This examination, like all CT scans performed in the [facility], was performed utilizing techniques to minimize radiation dose exposure, including the use of iterative reconstruction and automated exposure control  Enteric contrast was administered  FINDINGS: CHEST LUNGS:  Patchy areas of groundglass in the left upper and superior segment of the left lower lobe and right upper lobe with associated inter and intralobular septal thickening  Lungs otherwise are clear  Central airways patent  PLEURA:  Unremarkable  HEART/GREAT VESSELS: Heart is unremarkable for patient's age  No thoracic aortic aneurysm  MEDIASTINUM AND GAYLE:  Unremarkable  CHEST WALL AND LOWER NECK:  Unremarkable  ABDOMEN LIVER/BILIARY TREE:  Unremarkable  GALLBLADDER:  No calcified gallstones  No pericholecystic inflammatory change  SPLEEN:  Unremarkable  PANCREAS:  Unremarkable  ADRENAL GLANDS:  Unremarkable  KIDNEYS/URETERS:  Mild nonspecific bilateral perinephric fat stranding  No hydronephrosis or renal calculi  STOMACH AND BOWEL:  Unremarkable  APPENDIX:  No findings to suggest appendicitis  ABDOMINOPELVIC CAVITY:  No ascites  No pneumoperitoneum  No lymphadenopathy  VESSELS:  Unremarkable for patient's age  PELVIS REPRODUCTIVE ORGANS:  Unremarkable for patient's age  URINARY BLADDER:  Unremarkable  ABDOMINAL WALL/INGUINAL REGIONS:  Unremarkable  OSSEOUS STRUCTURES:  No acute fracture or destructive osseous lesion  Multiple healed right-sided rib fractures  Impression: Bilateral groundglass opacities with superimposed inter and intralobular septal thickening    Differential diagnosis includes pulmonary hemorrhage, infection, and pulmonary edema, although the distribution is atypical for edema  The study was marked in [facility] for immediate notification  Workstation performed: [id number]     XR hip/pelv 2-3 vws right    Result Date: 9/19/2022  Narrative: RIGHT HIP INDICATION:   fall  COMPARISON:  None VIEWS:  XR HIP/PELV 2-3 VWS RIGHT W PELVIS IF PERFORMED Images: 4 FINDINGS: There is no acute fracture or dislocation  No significant hip degenerative changes  No lytic or blastic osseous lesion  Soft tissues are unremarkable  The visualized lumbar spine is unremarkable  Impression: No acute osseous abnormality  Workstation performed: [id number]     XR ankle 3+ views RIGHT    Result Date: 9/19/2022  Narrative: RIGHT ANKLE INDICATION:   fall  COMPARISON:  None VIEWS:  XR ANKLE 3+ VW RIGHT Images: 3 FINDINGS: There is no acute fracture or dislocation  No significant degenerative changes  No lytic or blastic osseous lesion  Soft tissues are unremarkable  Impression: No acute osseous abnormality  Workstation performed: [id number]     XR foot 3+ views RIGHT    Result Date: 9/19/2022  Narrative: RIGHT FOOT INDICATION:   fall  COMPARISON:  None VIEWS:  XR FOOT 3+ VW RIGHT Images: 3 FINDINGS: There is no acute fracture or dislocation  No significant degenerative changes  No lytic or blastic osseous lesion  Soft tissues are unremarkable  Impression: No acute osseous abnormality  Workstation performed: [id number]     CT head without contrast    Result Date: 9/19/2022  Narrative: CT BRAIN - WITHOUT CONTRAST INDICATION:   fall, ams  COMPARISON:  None  TECHNIQUE:  CT examination of the brain was performed  In addition to axial images, sagittal and coronal 2D reformatted images were created and submitted for interpretation  Radiation dose length product (DLP) for this visit:  868 mGy-cm     This examination, like all CT scans performed in the [facility], was performed utilizing techniques to minimize radiation dose exposure, including the use of iterative reconstruction and automated exposure control  IMAGE QUALITY:  Diagnostic  FINDINGS: PARENCHYMA:  No intracranial mass, mass effect or midline shift  No CT signs of acute infarction  No acute parenchymal hemorrhage  Chronic lacunar infarct right basal ganglia  Minimal periventricular white matter hypodensity  Calcification bilateral cavernous internal carotid arteries  VENTRICLES AND EXTRA-AXIAL SPACES:  Normal for the patient's age  VISUALIZED ORBITS AND PARANASAL SINUSES:  Trace mucosal thickening right maxillary sinus  Orbits unremarkable  CALVARIUM AND EXTRACRANIAL SOFT TISSUES:  Normal      Impression: No acute intracranial process  No skull fracture  Workstation performed: [id number]     CT spine cervical without contrast    Result Date: 9/19/2022  Narrative: CT CERVICAL SPINE - WITHOUT CONTRAST INDICATION:   fall  COMPARISON:  Spine radiographs 4/21/2022 TECHNIQUE:  CT examination of the cervical spine was performed without intravenous contrast   Contiguous axial images were obtained  Sagittal and coronal reconstructions were performed  Radiation dose length product (DLP) for this visit:  365 mGy-cm   This examination, like all CT scans performed in the [facility], was performed utilizing techniques to minimize radiation dose exposure, including the use of iterative reconstruction and automated exposure control  IMAGE QUALITY:  Diagnostic  FINDINGS: ALIGNMENT:  No acute posttraumatic malalignment  Stable mild reversal of the usual cervical lordosis and minimal grade 1 degenerative anterolisthesis of C3 on C4 and C4 on C5  VERTEBRAL BODIES:  No fracture  DEGENERATIVE CHANGES:  Moderate left C3-4 and right C4-5 facet degenerative changes  Mild multilevel cervical degenerative changes elsewhere without critical central canal stenosis  PREVERTEBRAL AND PARASPINAL SOFT TISSUES:  No prevertebral soft tissue swelling   Right carotid artery calcification  THORACIC INLET:  Trace biapical emphysematous changes, right greater than left  Partially visualized subpleural groundglass opacity in the left pulmonary apex presumably scarring  Impression: No cervical spine fracture or traumatic malalignment  Incidental finding of partially visualized subpleural groundglass opacity in the left pulmonary apex presumably scarring  Cannot exclude infiltrate  This study demonstrates a significant  finding and was documented as such in [facility] for liaison and referring practitioner notification  Workstation performed: [id number]     MRI brain wo contrast    Result Date: 9/23/2022  Narrative: MRI BRAIN WITHOUT CONTRAST-limited INDICATION: Persistent change in mental status  COMPARISON:   None  TECHNIQUE:  Sagittal T1, axial FLAIR, and axial diffusion imaging  IMAGE QUALITY:  Severely limited by patient motion artifact and Limited pulse sequences performed FINDINGS: BRAIN PARENCHYMA:  No evidence of recent infarct  No mass effect or midline shift  No gross hemorrhage  VENTRICLES:  No evidence of hydrocephalus SELLA AND PITUITARY GLAND:  Suboptimally evaluated ORBITS:  Suboptimally evaluated PARANASAL SINUSES:  Suboptimally evaluated VASCULATURE:  Suboptimally evaluated CALVARIUM AND SKULL BASE:  Suboptimally evaluated EXTRACRANIAL SOFT TISSUES:  Suboptimally evaluated     Impression: No evidence of recent infarct  No mass effect or midline shift  Study performed in the limited fashion with extensive motion artifact  Workstation performed: [id number]     XR chest 1 view    Result Date: 9/19/2022  Narrative: CHEST INDICATION:   ams  COMPARISON:  10/28/2018 EXAM PERFORMED/VIEWS:  XR CHEST 1 VIEW Images: 3 FINDINGS: Cardiomediastinal silhouette appears unremarkable  The lungs are clear  No pneumothorax or pleural effusion  Old healed right-sided rib fracture deformities again evident     Impression: No acute cardiopulmonary disease   Findings are stable Workstation performed: [id number]     VAS lower limb venous duplex study, unilateral/limited    Result Date: 9/25/2022  Narrative:  [facility] REPORT CLINICAL: Indications: Patient presents with persistent right lower extremity edema  Prior study on 09-19-22 was negative for DVT and Physician wants repeat duplex and also to r/o iliac vein and IVC thrombus  Risk Factors The patient has history of HTN, Hyperlipidemia and smoking (current) 0 5 ppd  CONCLUSION: Impression: RIGHT LOWER LIMB No evidence of acute or chronic deep vein thrombosis   No evidence of superficial thrombophlebitis noted  Doppler evaluation shows a normal response to augmentation maneuvers  Popliteal, posterior tibial and anterior tibial arterial Doppler waveforms are triphasic  LEFT LOWER LIMB LIMITED Evaluation shows no evidence of thrombus in the common femoral vein  Doppler evaluation shows a normal response to augmentation maneuvers  Note: There is no evidence of DVT in the inferior vena cava and b/l common and external iliac veins  Technical findings were given to [facility]  SIGNATURE: Electronically Signed by: Loc Prater MD, RPVI on 2022-09-25 10:24:18 PM    VAS lower limb venous duplex study, unilateral/limited    Result Date: 9/19/2022  Narrative:  [facility] REPORT CLINICAL: Indications: Patient presents with right lower extremity edema  Risk Factors The patient has history of HTN, Hyperlipidemia and smoking (current) 0 5 ppd  CONCLUSION: Impression: RIGHT LOWER LIMB No evidence of acute or chronic deep vein thrombosis   No evidence of superficial thrombophlebitis noted  Doppler evaluation shows a normal response to augmentation maneuvers  Popliteal, posterior tibial and anterior tibial arterial Doppler waveforms are monophasic  LEFT LOWER LIMB LIMITED Unable to obtain comparison due to patient cooperation  TECH NOTE: Limited visualization due to patient cooperation    SIGNATURE: Electronically Signed by: Jamal Perry [facility], MD, [address] on 2022-09-19 10:04:30 PM    US right upper quadrant with liver dopplers    Result Date: 9/20/2022  Narrative: RIGHT UPPER QUADRANT ULTRASOUND WITH LIVER DOPPLER INDICATION:     elevated LFTs  COMPARISON:  None  TECHNIQUE:   Real-time ultrasound of the right upper quadrant was performed with a curvilinear transducer with both volumetric sweeps and still imaging techniques  FINDINGS: PANCREAS:  Portions of the pancreas are obscured by bowel gas  Visualized portions of the pancreas are unremarkable  AORTA AND IVC:  Visualized portions are normal for patient age  LIVER: Size:  Within normal range  The liver measures 15 4 cm in the midclavicular line  Contour:  Surface contour is smooth  Parenchyma:  Echogenicity and echotexture are within normal limits  No liver mass identified  LIVER DOPPLER: The main portal vein and primary branch segments are patent and hepatopetal with normal spectral waveform  Hepatic veins are patent  Spectral waveforms within normal limits  Main hepatic artery appears normal size, patent with normal spectral waveform  BILIARY: The gallbladder is normal in caliber  No wall thickening or pericholecystic fluid  There is minimal layering sludge  There are no stones  No sonographic Bustos's sign  No intrahepatic biliary dilatation  CBD measures 3 0 mm  No choledocholithiasis  KIDNEY: Right kidney measures 12 0 x 6 1 x 4 9 cm  Volume 184 8 mL Kidney within normal limits  ASCITES:   None  Impression: 1  Minimal layering sludge without evidence of acute cholecystitis  2   Normal-appearing liver with normal liver Dopplers  Workstation performed: [id number]     CT lower extremity wo contrast right    Result Date: 9/19/2022  Narrative: CT tibia-fibula without IV contrast INDICATION: Knee pain, chronic, positive xray (Age >= 5y) right calf tenderness; rhabdo eval compartments if possible  Dr Peter Knutson note from 9/19/2022 was reviewed    Patient has traumatic rhabdomyolysis due to fall on Friday and has tense right lower extremity  There is clinical concern for compartment injury  COMPARISON: None  TECHNIQUE: CT examination of the above was performed  This examination, like all CT scans performed in the [facility], was performed utilizing techniques to minimize radiation dose exposure, including the use of iterative reconstruction and automated exposure control software  Sagittal and coronal two dimensional reconstructed images were also submitted for interpretation  Rad dose  576 mGy-cm FINDINGS: OSSEOUS STRUCTURES:  No fracture, dislocation or destructive osseous lesion  VISUALIZED MUSCULATURE:  There is no evidence of intramuscular hematoma or other mass lesion in the right calf  SOFT TISSUES:  Mild diffuse subcutaneous edema  OTHER PERTINENT FINDINGS:  None  Impression: There is no evidence of intramuscular hematoma or other mass lesion in the right calf  Please note that rhabdomyolysis and compartment syndrome are clinical diagnoses, and are not excluded based on these imaging findings  Workstation performed: [id number]     Echo complete w/ contrast if indicated    Result Date: 9/27/2022  Narrative: Technically difficult but adequate transthoracic echocardiogram study  1  Normal left ventricular size and systolic function, probably normal diastolic function  Ejection fraction is estimated as around 63%  2  Normal right ventricular size and systolic function  3  Top normal right atrial cavity size  4  Aortic valve sclerosis, no aortic valve stenosis or regurgitation  5  Trace mitral valve regurgitation  6  Trace tricuspid and pulmonic valve regurgitation  7  No obvious pulmonary hypertension  8  No pericardial effusion  No previous echocardiogram is available for comparison        EKG/Pathology/Other Studies:   Lab Results   Component Value Date    VENTRATE 103 09/19/2022    ATRIALRATE 103 09/19/2022    PRINT 146 09/19/2022    QRSDINT 84 09/19/2022    QTINT 338 09/19/2022    QTCINT 442 09/19/2022    PAXIS 79 09/19/2022    QRSAXIS 93 09/19/2022    TWAVEAXIS 82 09/19/2022        Code Status: Level 1 - Full Code  Advance Directive and Living Will:      Power of :    POLST:      Counseling / Coordination of Care: Total floor / unit time spent today 30 minutes  Greater than 50% of total time was spent with the patient and / or family counseling and / or coordination of care  A description of the counseling / coordination of care:  Chart review, patient evaluation, coordination communication with staff, nursing and provider

## 2022-09-29 NOTE — ARC ADMISSION
Received referral on patient for possible ARC placement  Upon review of patient’s case with [facility] physician, patient deemed not ARC appropriate at this time  Slower paced therapies recommended  CM made aware    Thank you, [address] Admissions

## 2022-09-29 NOTE — SPEECH THERAPY NOTE
Speech Language/Pathology    Speech/Language Pathology Progress Note    Patient Name: Demetrius Grace  Today's Date: 2022     Problem List  Principal Problem:    Encephalopathy acute  Active Problems:    Depression    Tobacco abuse    DDD (degenerative disc disease), lumbosacral    ARF (acute renal failure) (HCC)    Transaminitis    Abnormal CT of the chest    Traumatic rhabdomyolysis (HCC)    D-dimer, elevated    Leg edema, right    Localized swelling of right upper extremity    Bacteremia due to methicillin susceptible Staphylococcus aureus (MSSA)       Past Medical History  Past Medical History:   Diagnosis Date   • Chronic pain disorder    • Depression    • GERD (gastroesophageal reflux disease)    • History of electroconvulsive therapy    • Low back pain    • Self-injurious behavior    • Suicide attempt [facility])         Past Surgical History  Past Surgical History:   Procedure Laterality Date   •  SECTION     • COLONOSCOPY     • PANCREAS SURGERY      "pseudocysts" per patient's  Ricki Infante   • DC ESOPHAGOGASTRODUODENOSCOPY TRANSORAL DIAGNOSTIC N/A 4/10/2018    Procedure: EGD AND COLONOSCOPY;  Surgeon: Maribel Alvarado MD;  [place] [facility]  GI LAB; Service: Gastroenterology         Subjective:  Pt responded to me when I arrived and asked how she was  As the session progressed, I had to stae her name to get her attention  Verbalizations lessened as well  Objective:  Seen fo po tolerance and additional recommendations  Room dark when I arrived, Turned the lights on and openend the blinds  Mliops dry, applied chap stick  Pt agreeable to jello and the ensure (small size)  Initially she was biting on the straw  Switchedto jello  mildly prolonged oral manipulation  Transfer and swallow were prompt  Tried the ensure again,  Able to suck from the straw this time and drank all of it w/ prompt swallow  No cough or wet vocal quality   Refused the cream of what and chocalate pudding, though this is the most PO she has taken in a while  Assessment:  Some progress noted w/ me today  More verbal, still w/ extraneous movements  completed kamran and supplement wfl  Plan/Recommendations:  Upgrade to puree and thin  Please keep her room brighter during the day

## 2022-09-29 NOTE — PHYSICAL THERAPY NOTE
PHYSICAL THERAPY NOTE          Patient Name: Jordan Case  BLVDY'X Date: 9/29/2022 09/29/22 1545   PT Last Visit   PT Visit Date 09/29/22   Note Type   Note Type Cancelled Session   Cancel Reasons Other  (Per OT, RN requested to defer PT & OT intereventions today  Please see OT cancel note for details   Will continue to follow as appropriate )   Desiree Person

## 2022-09-29 NOTE — PROGRESS NOTES
NEPHROLOGY PROGRESS NOTE   Rai Ray 45 y o  female MRN: [id number]  Unit/Bed#: [place] [address] -01 Encounter: [id number]  Reason for Consult: ANUJA    ASSESSMENT AND PLAN:  Patient is 20-year-old female with depression, GERD, presented with altered mental status   We are consulted for ANUJA management      ANUJA POA, baseline creatinine 0 7 to 1  -ANUJA suspect secondary to underlying ATN, severe rhabdomyolysis, now with MSSA bacteremia  -creatinine 5 8 on admission worsened up to peak level 7 6 now seems to be overall slowly improving 6 4 today  -UA on admission showed no significant hematuria, trace proteinuria     -unfortunately no accurate urine output available, discussed with floor nurse, patient has pulled out Rivera catheters, not able to keep purewick   Will try to do pad weights if possible   She remains incontinent  -patient seems more active , alert today comparing to yesterday, currently monitoring off dialysis given improving renal function   -dialysis consent is obtained by Benjy Norris, which is in the chart  -closely reassess daily for need for dialysis  -closely monitor for any worsening uremic symptoms  -status post IV Lasix trial earlier this week  -will change IV fluid to IV half-normal saline at 75 mL/hour    Severe rhabdomyolysis, status post fall  -peak CK level 32,630 for now seems to be improving with most recent 624     Mild hypokalemia, serum potassium 3 2 below goal    Will give IV potassium chloride total 60 mEq once today      Metabolic acidosis in the setting of severe renal failure  -now resolved, bicarb level has much improved      Right lower extremity edema, no DVT on duplex ultrasound  CK level trending down   -confirmed with orthopedic and primary team earlier that patient will be getting MRI without gadolinium      Encephalopathy, suspect multifactorial, continue to avoid gabapentin, any contributing medications    -neurology following, status post LP, ongoing  evaluation for autoimmune encephalitis as per Neurology  -patient unable to tolerate EEG  -also has MSSA bacteremia two of two sets contributing   -another less likely differential uremic encephalopathy, now with improving renal function with slowly improving azotemia      MSSA bacteremia, suspect secondary to phlebitis, id follow-up, on IV antibiotic as per ID      Discussed above plan in detail with primary team     SUBJECTIVE:  Patient seen and examined at bedside  She seems to be more active, alert today  Still remains confused      OBJECTIVE:  Current Weight: Weight - Scale: 67 8 kg (149 lb 7 6 oz)  Vitals:    09/29/22 0708   BP: (!) 161/109   Pulse: (!) 108   Resp: 18   Temp: 97 6 °F (36 4 °C)   SpO2: 93%     No intake or output data in the 24 hours ending 09/29/22 0901  Wt Readings from Last 3 Encounters:   09/29/22 67 8 kg (149 lb 7 6 oz)   09/12/22 58 5 kg (129 lb)   08/10/22 58 5 kg (129 lb)     Temp Readings from Last 3 Encounters:   09/29/22 97 6 °F (36 4 °C)   09/12/22 99 8 °F (37 7 °C) (Tympanic)   07/25/22 99 7 °F (37 6 °C) (Tympanic)     BP Readings from Last 3 Encounters:   09/29/22 (!) 161/109   09/12/22 90/52   08/10/22 126/86     Pulse Readings from Last 3 Encounters:   09/29/22 (!) 108   09/12/22 72   08/10/22 (!) 122        Physical Examination:  General:  Lying in bed, no acute distress   Eyes:  Mild conjunctival pallor present  ENT:  External examination of ears and nose unremarkable  Neck:  No obvious lymphadenopathy appreciated  Respiratory:  Bilateral air entry present  CVS:  S1, S2 present  GI:  Soft, nondistended  CNS:  Active, alert, oriented to herself, was able to tell me that she is in the [facility]   Otherwise confused  Skin:  No new rash  Musculoskeletal:  No obvious new gross deformity noted    Medications:    Current Facility-Administered Medications:   •  acetaminophen (TYLENOL) tablet 650 mg, 650 mg, Oral, Q6H PRN, Atul Adan, , 650 mg at 09/22/22 1040  •  ceFAZolin (ANCEF) IVPB (premix in dextrose) 1,000 mg 50 mL, 1,000 mg, Intravenous, Q24H, Álvaro Knox PA-C, Last Rate: 100 mL/hr at 09/28/22 1813, 1,000 mg at 09/28/22 1813  •  heparin (porcine) subcutaneous injection 5,000 Units, 5,000 Units, Subcutaneous, Q8H [address], Atul Adan DO, 5,000 Units at 09/29/22 0518  •  HYDROmorphone (DILAUDID) injection 0 5 mg, 0 5 mg, Intravenous, Q4H PRN, Kristin Gomez, , 0 5 mg at 09/27/22 1116  •  LORazepam (ATIVAN) injection 0 5 mg, 0 5 mg, Intravenous, Q6H PRN, Sonia Ugalde, DO, 0 5 mg at 09/29/22 0518  •  LORazepam (ATIVAN) injection 1 mg, 1 mg, Intravenous, Once, Atul Adan DO  •  multi-electrolyte (PLASMALYTE-A/ISOLYTE-S PH 7 4) IV solution, 100 mL/hr, Intravenous, Continuous, Mk Graf MD, Last Rate: 100 mL/hr at 09/28/22 1705, 100 mL/hr at 09/28/22 1705  •  nicotine (NICODERM CQ) 14 mg/24hr TD 24 hr patch 1 patch, 1 patch, Transdermal, Daily, Atul Adan DO, 1 patch at 09/28/22 0307  •  ondansetron (ZOFRAN) injection 4 mg, 4 mg, Intravenous, Q4H PRN, Kristin Gomez DO  •  QUEtiapine (SEROquel) tablet 25 mg, 25 mg, Oral, HS, Atul Adan DO, 25 mg at 09/28/22 2200  •  thiamine (VITAMIN B1) 500 mg in sodium chloride 0 9 % 50 mL IVPB, 500 mg, Intravenous, TID, Sam Ardon DO, Last Rate: 100 mL/hr at 09/28/22 2203, 500 mg at 09/28/22 2203  •  venlafaxine (EFFEXOR-XR) 24 hr capsule 150 mg, 150 mg, Oral, Daily, Atul Adna DO, 150 mg at 09/28/22 6301    Laboratory Results:  Results from last 7 days   Lab Units 09/29/22  0513 09/28/22  0542 09/27/22  0557 09/26/22  0454 09/26/22  0452 09/25/22  0551 09/24/22  0548 09/23/22  0824 09/23/22  0530   WBC Thousand/uL 9 27 8 58 11 66*  --  11 15*  --  7 44  --  7 64   HEMOGLOBIN g/dL 9 7* 11 5 12 2  --  12 5  --  11 1*  --  11 5   HEMATOCRIT % 30 4* 36 2 37 6  --  39 0  --  35 4  --  36 2   PLATELETS Thousands/uL 282 293 308  --  310  --  297  --  312   SODIUM mmol/L 145 144 140 142  --  143 140 137 138   POTASSIUM mmol/L 3 2* 3 3* 4 0 3 6  --  4 3 4 6 5 4* 4 7   CHLORIDE mmol/L 103 103 100 104  --  107 108 104 104   CO2 mmol/L 30 29 24 22  --  20* 18* 20* 20*   BUN mg/dL 41* 48* 50* 53*  --  51* 52* 54* 52*   CREATININE mg/dL 6 45* 6 94* 7 08* 7 63*  --  7 67* 7 55* 6 98* 7 15*   CALCIUM mg/dL 8 9 8 9 8 8 9 1  --  8 4 8 2* 8 3 8 1*       VAS lower limb venous duplex study, unilateral/limited   Final Result by Hemalatha Walsh MD (09/25 2224)      MRI brain wo contrast   Final Result by Janet 6, DO (09/23 1510)      No evidence of recent infarct  No mass effect or midline shift  Study performed in the limited fashion with extensive motion artifact  Workstation performed: [id number]         US right upper quadrant with liver dopplers   Final Result by Kishor Dillard MD (09/20 1326)      1  Minimal layering sludge without evidence of acute cholecystitis  2   Normal-appearing liver with normal liver Dopplers  Workstation performed: [id number]         VAS lower limb venous duplex study, unilateral/limited   Final Result by Hemalatha Walsh MD (09/19 2204)      CT lower extremity wo contrast right   Final Result by Rayne Thurston MD (09/19 1535)      There is no evidence of intramuscular hematoma or other mass lesion in the right calf  Please note that rhabdomyolysis and compartment syndrome are clinical diagnoses, and are not excluded based on these imaging findings  Workstation performed: [id number]         CT chest abdomen pelvis wo contrast   Final Result by Almita Goode MD (09/19 1301)      Bilateral groundglass opacities with superimposed inter and intralobular septal thickening  Differential diagnosis includes pulmonary hemorrhage, infection, and pulmonary edema, although the distribution is atypical for edema  The study was marked in [facility] for immediate notification              Workstation performed: [id number]         XR ankle 3+ views RIGHT   Final Result by Melly Cruz MD (09/19 1009)      No acute osseous abnormality  Workstation performed: [id number]         XR foot 3+ views RIGHT   Final Result by Melly Cruz MD (09/19 1009)      No acute osseous abnormality  Workstation performed: [id number]         XR hip/pelv 2-3 vws right   Final Result by Melly Cruz MD (09/19 1007)      No acute osseous abnormality  Workstation performed: [id number]         XR chest 1 view   Final Result by Melly Cruz MD (09/19 1008)      No acute cardiopulmonary disease  Findings are stable            Workstation performed: [id number]         CT head without contrast   Final Result by Husam Ybarra MD (09/19 1010)      No acute intracranial process  No skull fracture  Workstation performed: [id number]         CT spine cervical without contrast   Final Result by Husam Ybarra MD (09/19 2331)      No cervical spine fracture or traumatic malalignment  Incidental finding of partially visualized subpleural groundglass opacity in the left pulmonary apex presumably scarring  Cannot exclude infiltrate  This study demonstrates a significant  finding and was documented as such in [facility] for liaison and referring practitioner notification  Workstation performed: [id number]         MRI inpatient order    (Results Pending)   MRI inpatient order    (Results Pending)       Portions of the record may have been created with voice recognition software  Occasional wrong word or "sound a like" substitutions may have occurred due to the inherent limitations of voice recognition software  Read the chart carefully and recognize, using context, where substitutions have occurred

## 2022-09-29 NOTE — OCCUPATIONAL THERAPY NOTE
OCCUPATIONAL THERAPY CANCELLATION     Attempted OT tx this morning at 10:35am  RN requested OT hold off as pt was sleeping and she had restless night/early morning  Currently in restraints  Second attempt this afternoon but RN reports psych is coming to see pt and recommends OT follows up after  Will cancel for today and follow up as as able and appropriate         Manan Arellano MS, OTR/L

## 2022-09-30 ENCOUNTER — APPOINTMENT (OUTPATIENT)
Dept: RADIOLOGY | Facility: HOSPITAL | Age: 52
End: 2022-09-30

## 2022-09-30 ENCOUNTER — TELEPHONE (OUTPATIENT)
Dept: FAMILY MEDICINE CLINIC | Facility: CLINIC | Age: 52
End: 2022-09-30

## 2022-09-30 PROBLEM — J81.1 PULMONARY EDEMA: Status: ACTIVE | Noted: 2022-09-30

## 2022-09-30 LAB
ALBUMIN SERPL BCP-MCNC: 2.4 G/DL (ref 3.5–5)
ALP SERPL-CCNC: 85 U/L (ref 46–116)
ALT SERPL W P-5'-P-CCNC: 62 U/L (ref 12–78)
ANION GAP SERPL CALCULATED.3IONS-SCNC: 11 MMOL/L (ref 4–13)
AST SERPL W P-5'-P-CCNC: 68 U/L (ref 5–45)
BASOPHILS # BLD AUTO: 0.04 THOUSANDS/ÂΜL (ref 0–0.1)
BASOPHILS NFR BLD AUTO: 0 % (ref 0–1)
BILIRUB SERPL-MCNC: 0.4 MG/DL (ref 0.2–1)
BUN SERPL-MCNC: 40 MG/DL (ref 5–25)
CALCIUM ALBUM COR SERPL-MCNC: 10.4 MG/DL (ref 8.3–10.1)
CALCIUM SERPL-MCNC: 9.1 MG/DL (ref 8.3–10.1)
CHLORIDE SERPL-SCNC: 104 MMOL/L (ref 96–108)
CO2 SERPL-SCNC: 30 MMOL/L (ref 21–32)
CREAT SERPL-MCNC: 6.37 MG/DL (ref 0.6–1.3)
EOSINOPHIL # BLD AUTO: 0 THOUSAND/ÂΜL (ref 0–0.61)
EOSINOPHIL NFR BLD AUTO: 0 % (ref 0–6)
ERYTHROCYTE [DISTWIDTH] IN BLOOD BY AUTOMATED COUNT: 14 % (ref 11.6–15.1)
GFR SERPL CREATININE-BSD FRML MDRD: 6 ML/MIN/1.73SQ M
GLUCOSE SERPL-MCNC: 126 MG/DL (ref 65–140)
GLUCOSE SERPL-MCNC: 134 MG/DL (ref 65–140)
HCT VFR BLD AUTO: 31 % (ref 34.8–46.1)
HGB BLD-MCNC: 9.8 G/DL (ref 11.5–15.4)
IMM GRANULOCYTES # BLD AUTO: 0.07 THOUSAND/UL (ref 0–0.2)
IMM GRANULOCYTES NFR BLD AUTO: 1 % (ref 0–2)
LYMPHOCYTES # BLD AUTO: 0.67 THOUSANDS/ÂΜL (ref 0.6–4.47)
LYMPHOCYTES NFR BLD AUTO: 5 % (ref 14–44)
MCH RBC QN AUTO: 33.1 PG (ref 26.8–34.3)
MCHC RBC AUTO-ENTMCNC: 31.6 G/DL (ref 31.4–37.4)
MCV RBC AUTO: 105 FL (ref 82–98)
MONOCYTES # BLD AUTO: 0.51 THOUSAND/ÂΜL (ref 0.17–1.22)
MONOCYTES NFR BLD AUTO: 4 % (ref 4–12)
NEUTROPHILS # BLD AUTO: 13.49 THOUSANDS/ÂΜL (ref 1.85–7.62)
NEUTS SEG NFR BLD AUTO: 90 % (ref 43–75)
NRBC BLD AUTO-RTO: 0 /100 WBCS
NT-PROBNP SERPL-MCNC: ABNORMAL PG/ML
PLATELET # BLD AUTO: 280 THOUSANDS/UL (ref 149–390)
PMV BLD AUTO: 10.3 FL (ref 8.9–12.7)
POTASSIUM SERPL-SCNC: 3.8 MMOL/L (ref 3.5–5.3)
PROT SERPL-MCNC: 5.7 G/DL (ref 6.4–8.4)
RBC # BLD AUTO: 2.96 MILLION/UL (ref 3.81–5.12)
SODIUM SERPL-SCNC: 145 MMOL/L (ref 135–147)
VIT B1 BLD-SCNC: 93.2 NMOL/L (ref 66.5–200)
WBC # BLD AUTO: 14.78 THOUSAND/UL (ref 4.31–10.16)

## 2022-09-30 RX ORDER — ALBUTEROL SULFATE 2.5 MG/3ML
SOLUTION RESPIRATORY (INHALATION)
Status: COMPLETED
Start: 2022-09-30 | End: 2022-09-30

## 2022-09-30 RX ORDER — LORAZEPAM 2 MG/ML
0.5 INJECTION INTRAMUSCULAR ONCE
Status: COMPLETED | OUTPATIENT
Start: 2022-09-30 | End: 2022-09-30

## 2022-09-30 RX ORDER — ALPRAZOLAM 0.5 MG/1
0.5 TABLET ORAL
Status: DISCONTINUED | OUTPATIENT
Start: 2022-09-30 | End: 2022-09-30

## 2022-09-30 RX ORDER — BUMETANIDE 0.25 MG/ML
1 INJECTION, SOLUTION INTRAMUSCULAR; INTRAVENOUS ONCE
Status: DISCONTINUED | OUTPATIENT
Start: 2022-09-30 | End: 2022-09-30

## 2022-09-30 RX ORDER — BUMETANIDE 0.25 MG/ML
2 INJECTION, SOLUTION INTRAMUSCULAR; INTRAVENOUS ONCE
Status: COMPLETED | OUTPATIENT
Start: 2022-09-30 | End: 2022-09-30

## 2022-09-30 RX ORDER — ALPRAZOLAM 0.5 MG/1
0.5 TABLET ORAL
Status: DISCONTINUED | OUTPATIENT
Start: 2022-09-30 | End: 2022-10-04

## 2022-09-30 RX ORDER — QUETIAPINE FUMARATE 25 MG/1
50 TABLET, FILM COATED ORAL 2 TIMES DAILY
Status: DISCONTINUED | OUTPATIENT
Start: 2022-09-30 | End: 2022-10-04

## 2022-09-30 RX ORDER — IPRATROPIUM BROMIDE AND ALBUTEROL SULFATE 2.5; .5 MG/3ML; MG/3ML
SOLUTION RESPIRATORY (INHALATION)
Status: DISCONTINUED
Start: 2022-09-30 | End: 2022-09-30 | Stop reason: WASHOUT

## 2022-09-30 RX ORDER — ALBUTEROL SULFATE 2.5 MG/3ML
10 SOLUTION RESPIRATORY (INHALATION) ONCE
Status: COMPLETED | OUTPATIENT
Start: 2022-09-30 | End: 2022-09-30

## 2022-09-30 RX ORDER — FUROSEMIDE 10 MG/ML
40 INJECTION INTRAMUSCULAR; INTRAVENOUS ONCE
Status: COMPLETED | OUTPATIENT
Start: 2022-09-30 | End: 2022-09-30

## 2022-09-30 RX ORDER — GABAPENTIN 250 MG/5ML
100 SOLUTION ORAL 3 TIMES DAILY
Status: DISCONTINUED | OUTPATIENT
Start: 2022-09-30 | End: 2022-10-04

## 2022-09-30 RX ADMIN — VANCOMYCIN HYDROCHLORIDE 125 MG: 500 INJECTION, POWDER, LYOPHILIZED, FOR SOLUTION INTRAVENOUS at 09:23

## 2022-09-30 RX ADMIN — QUETIAPINE FUMARATE 50 MG: 25 TABLET ORAL at 17:30

## 2022-09-30 RX ADMIN — Medication 1 MG: at 01:17

## 2022-09-30 RX ADMIN — THIAMINE HYDROCHLORIDE 500 MG: 100 INJECTION, SOLUTION INTRAMUSCULAR; INTRAVENOUS at 09:23

## 2022-09-30 RX ADMIN — THIAMINE HYDROCHLORIDE 500 MG: 100 INJECTION, SOLUTION INTRAMUSCULAR; INTRAVENOUS at 23:30

## 2022-09-30 RX ADMIN — LORAZEPAM 0.5 MG: 2 INJECTION INTRAMUSCULAR; INTRAVENOUS at 04:13

## 2022-09-30 RX ADMIN — HEPARIN SODIUM 5000 UNITS: 5000 INJECTION INTRAVENOUS; SUBCUTANEOUS at 16:07

## 2022-09-30 RX ADMIN — QUETIAPINE FUMARATE 25 MG: 25 TABLET ORAL at 09:23

## 2022-09-30 RX ADMIN — THIAMINE HYDROCHLORIDE 500 MG: 100 INJECTION, SOLUTION INTRAMUSCULAR; INTRAVENOUS at 18:28

## 2022-09-30 RX ADMIN — HYDROMORPHONE HYDROCHLORIDE 0.5 MG: 1 INJECTION, SOLUTION INTRAMUSCULAR; INTRAVENOUS; SUBCUTANEOUS at 13:52

## 2022-09-30 RX ADMIN — ALBUTEROL SULFATE 10 MG: 2.5 SOLUTION RESPIRATORY (INHALATION) at 01:15

## 2022-09-30 RX ADMIN — BUMETANIDE 2 MG: 0.25 INJECTION INTRAMUSCULAR; INTRAVENOUS at 11:59

## 2022-09-30 RX ADMIN — HEPARIN SODIUM 5000 UNITS: 5000 INJECTION INTRAVENOUS; SUBCUTANEOUS at 05:30

## 2022-09-30 RX ADMIN — OLANZAPINE 5 MG: 5 TABLET, ORALLY DISINTEGRATING ORAL at 22:18

## 2022-09-30 RX ADMIN — GABAPENTIN 100 MG: 250 SUSPENSION ORAL at 23:29

## 2022-09-30 RX ADMIN — HEPARIN SODIUM 5000 UNITS: 5000 INJECTION INTRAVENOUS; SUBCUTANEOUS at 23:29

## 2022-09-30 RX ADMIN — BUMETANIDE 2 MG: 0.25 INJECTION INTRAMUSCULAR; INTRAVENOUS at 16:30

## 2022-09-30 RX ADMIN — GABAPENTIN 100 MG: 250 SOLUTION ORAL at 09:23

## 2022-09-30 RX ADMIN — FUROSEMIDE 40 MG: 10 INJECTION, SOLUTION INTRAVENOUS at 01:37

## 2022-09-30 RX ADMIN — IPRATROPIUM BROMIDE 1 MG: 0.5 SOLUTION RESPIRATORY (INHALATION) at 01:17

## 2022-09-30 RX ADMIN — CEFAZOLIN SODIUM 1000 MG: 1 SOLUTION INTRAVENOUS at 20:05

## 2022-09-30 NOTE — TELEPHONE ENCOUNTER
Patient  called in and stated that he would like TH to give him a call back regarding his wife care that she is receiving in one of out hospitals   would also like recommendations on what can be done for his wife Please advise   Thank you

## 2022-09-30 NOTE — ASSESSMENT & PLAN NOTE
Patient with methicillin sensitive Staph aureus bacteremia  Echo with no vegetation  Will place CHERYLE referral per ID recommendations- unfortunately developed respiratory distress this morning and will need to be optimized prior to re-attempt  Potentially may have been present on admission as the patient has multiple wounds on her legs from previous fall 4 days ago  In differential diagnosis includes septic thrombophlebitis  Continue Ancef, started on prophylaxis Vancomycin to prevent C diff infection  Infectious Disease consultation reviewed

## 2022-09-30 NOTE — PLAN OF CARE
Problem: PAIN - ADULT  Goal: Verbalizes/displays adequate comfort level or baseline comfort level  Description: Interventions:  - Encourage patient to monitor pain and request assistance  - Assess pain using appropriate pain scale  - Administer analgesics based on type and severity of pain and evaluate response  - Implement non-pharmacological measures as appropriate and evaluate response  - Consider cultural and social influences on pain and pain management  - Notify physician/advanced practitioner if interventions unsuccessful or patient reports new pain  Outcome: Progressing     Problem: INFECTION - ADULT  Goal: Absence or prevention of progression during hospitalization  Description: INTERVENTIONS:  - Assess and monitor for signs and symptoms of infection  - Monitor lab/diagnostic results  - Monitor all insertion sites, i e  indwelling lines, tubes, and drains  - Monitor endotracheal if appropriate and nasal secretions for changes in amount and color  - [place] appropriate cooling/warming therapies per order  - Administer medications as ordered  - Instruct and encourage patient and family to use good hand hygiene technique  - Identify and instruct in appropriate isolation precautions for identified infection/condition  Outcome: Progressing  Goal: Absence of fever/infection during neutropenic period  Description: INTERVENTIONS:  - Monitor WBC    Outcome: Progressing     Problem: SAFETY ADULT  Goal: Patient will remain free of falls  Description: INTERVENTIONS:  - Educate patient/family on patient safety including physical limitations  - Instruct patient to call for assistance with activity   - Consult OT/PT to assist with strengthening/mobility   - Keep Call bell within reach  - Keep bed low and locked with side rails adjusted as appropriate  - Keep care items and personal belongings within reach  - Initiate and maintain comfort rounds  - Make Fall Risk Sign visible to staff  - Offer Toileting every 2 Hours, in advance of need  - Initiate/Maintain bed alarm  - Obtain necessary fall risk management equipment:   - Apply yellow socks and bracelet for high fall risk patients  - Consider moving patient to room near nurses station  Outcome: Progressing  Goal: Maintain or return to baseline ADL function  Description: INTERVENTIONS:  -  Assess patient's ability to carry out ADLs; assess patient's baseline for ADL function and identify physical deficits which impact ability to perform ADLs (bathing, care of mouth/teeth, toileting, grooming, dressing, etc )  - Assess/evaluate cause of self-care deficits   - Assess range of motion  - Assess patient's mobility; develop plan if impaired  - Assess patient's need for assistive devices and provide as appropriate  - Encourage maximum independence but intervene and supervise when necessary  - Involve family in performance of ADLs  - Assess for home care needs following discharge   - Consider OT consult to assist with ADL evaluation and planning for discharge  - Provide patient education as appropriate  Outcome: Progressing  Goal: Maintains/Returns to pre admission functional level  Description: INTERVENTIONS:  - Perform BMAT or MOVE assessment daily    - Set and communicate daily mobility goal to care team and patient/family/caregiver  - Collaborate with rehabilitation services on mobility goals if consulted  - Perform Range of Motion 4 times a day  - Reposition patient every 2 hours    - Dangle patient 3 times a day  - Stand patient 3 times a day  - Ambulate patient 3 times a day  - Out of bed to chair 3 times a day   - Out of bed for meals 3 times a day  - Out of bed for toileting  - Record patient progress and toleration of activity level   Outcome: Progressing     Problem: DISCHARGE PLANNING  Goal: Discharge to home or other facility with appropriate resources  Description: INTERVENTIONS:  - Identify barriers to discharge w/patient and caregiver  - Arrange for needed discharge resources and transportation as appropriate  - Identify discharge learning needs (meds, wound care, etc )  - Arrange for interpretive services to assist at discharge as needed  - Refer to Case Management Department for coordinating discharge planning if the patient needs post-hospital services based on physician/advanced practitioner order or complex needs related to functional status, cognitive ability, or social support system  Outcome: Progressing     Problem: Knowledge Deficit  Goal: Patient/family/caregiver demonstrates understanding of disease process, treatment plan, medications, and discharge instructions  Description: Complete learning assessment and assess knowledge base    Interventions:  - Provide teaching at level of understanding  - Provide teaching via preferred learning methods  Outcome: Progressing     Problem: Potential for Falls  Goal: Patient will remain free of falls  Description: INTERVENTIONS:  - Educate patient/family on patient safety including physical limitations  - Instruct patient to call for assistance with activity   - Consult OT/PT to assist with strengthening/mobility   - Keep Call bell within reach  - Keep bed low and locked with side rails adjusted as appropriate  - Keep care items and personal belongings within reach  - Initiate and maintain comfort rounds  - Make Fall Risk Sign visible to staff  - Offer Toileting every 2 Hours, in advance of need  - Initiate/Maintain bed alarm  - Obtain necessary fall risk management equipment: yellow braclet, alarm  - Apply yellow socks and bracelet for high fall risk patients  - Consider moving patient to room near nurses station  Outcome: Progressing     Problem: MOBILITY - ADULT  Goal: Maintain or return to baseline ADL function  Description: INTERVENTIONS:  -  Assess patient's ability to carry out ADLs; assess patient's baseline for ADL function and identify physical deficits which impact ability to perform ADLs (bathing, care of mouth/teeth, toileting, grooming, dressing, etc )  - Assess/evaluate cause of self-care deficits   - Assess range of motion  - Assess patient's mobility; develop plan if impaired  - Assess patient's need for assistive devices and provide as appropriate  - Encourage maximum independence but intervene and supervise when necessary  - Involve family in performance of ADLs  - Assess for home care needs following discharge   - Consider OT consult to assist with ADL evaluation and planning for discharge  - Provide patient education as appropriate  Outcome: Progressing  Goal: Maintains/Returns to pre admission functional level  Description: INTERVENTIONS:  - Perform BMAT or MOVE assessment daily    - Set and communicate daily mobility goal to care team and patient/family/caregiver  - Collaborate with rehabilitation services on mobility goals if consulted  - Perform Range of Motion 4 times a day  - Reposition patient every 2 hours    - Dangle patient 3 times a day  - Stand patient 3 times a day  - Ambulate patient 3 times a day  - Out of bed to chair 3 times a day   - Out of bed for meals 3 times a day  - Out of bed for toileting  - Record patient progress and toleration of activity level   Outcome: Progressing     Problem: Prexisting or High Potential for Compromised Skin Integrity  Goal: Skin integrity is maintained or improved  Description: INTERVENTIONS:  - Identify patients at risk for skin breakdown  - Assess and monitor skin integrity  - Assess and monitor nutrition and hydration status  - Monitor labs   - Assess for incontinence   - Turn and reposition patient  - Assist with mobility/ambulation  - Relieve pressure over bony prominences  - Avoid friction and shearing  - Provide appropriate hygiene as needed including keeping skin clean and dry  - Evaluate need for skin moisturizer/barrier cream  - Collaborate with interdisciplinary team   - Patient/family teaching  - Consider wound care consult   Outcome: Progressing Problem: Nutrition/Hydration-ADULT  Goal: Nutrient/Hydration intake appropriate for improving, restoring or maintaining nutritional needs  Description: Monitor and assess patient's nutrition/hydration status for malnutrition  Collaborate with interdisciplinary team and initiate plan and interventions as ordered  Monitor patient's weight and dietary intake as ordered or per policy  Utilize nutrition screening tool and intervene as necessary  Determine patient's food preferences and provide high-protein, high-caloric foods as appropriate       INTERVENTIONS:  - Monitor oral intake, urinary output, labs, and treatment plans  - Assess nutrition and hydration status and recommend course of action  - Evaluate amount of meals eaten  - Assist patient with eating if necessary   - Allow adequate time for meals  - Recommend/ encourage appropriate diets, oral nutritional supplements, and vitamin/mineral supplements  - Order, calculate, and assess calorie counts as needed  - Recommend, monitor, and adjust tube feedings and TPN/PPN based on assessed needs  - Assess need for intravenous fluids  - Provide specific nutrition/hydration education as appropriate  - Include patient/family/caregiver in decisions related to nutrition  Outcome: Progressing

## 2022-09-30 NOTE — PROGRESS NOTES
NEPHROLOGY PROGRESS NOTE   Yasemin Alcantara 45 y o  female MRN: [id number]  Unit/Bed#: [place] [address] -01 Encounter: [id number]  Reason for Consult: ANUJA    ASSESSMENT AND PLAN:  Patient is 64-year-old female with depression, GERD, presented with altered mental status   We are consulted for ANUJA management      ANJUA POA, baseline creatinine 0 7 to 1  -ANUJA suspect secondary to underlying ATN, severe rhabdomyolysis, now with MSSA bacteremia  -creatinine 5 8 on admission worsened up to peak level 7 6 now seems to be overall slowly improving 6 3 today  -UA on admission showed no significant hematuria, trace proteinuria     -unfortunately no accurate urine output available, discussed with floor nurse, patient has pulled out Rivera catheters, not able to keep purewick   Will try to do pad weights if possible   She remains incontinent  -currently monitoring off dialysis given improving renal function   -dialysis consent is obtained by Nitish Juares, which is in the chart  -closely reassess daily for need for dialysis  -closely monitor for any worsening uremic symptoms  -now remains off IV fluid    Severe rhabdomyolysis, status post fall  -peak CK level 32,630 for now seems to be improving with most recent 624     Mild hypokalemia, resolved with replacement  Serum potassium 3 8      Right lower extremity edema, no DVT on duplex ultrasound  CK level trending down   -noted no further plan for MRI as per orthopedic    Hypoxic respiratory failure  -chest x-ray concerning for pulmonary edema versus pneumonia  -currently requiring 6 L O2 via nasal cannula  -IV fluid is discontinued  Will give Bumex 2 mg IV once now  -closely monitor respiratory status  May repeat IV Bumex later today if respiratory status does not improve     Encephalopathy, suspect multifactorial, continue to avoid gabapentin, any contributing medications    -neurology following, status post LP, ongoing  evaluation for autoimmune encephalitis as per Neurology  -patient unable to tolerate EEG  -also has MSSA bacteremia two of two sets contributing   -another less likely differential uremic encephalopathy, now with improving renal function with slowly improving azotemia      MSSA bacteremia, suspect secondary to phlebitis, id follow-up, on IV antibiotic as per ID      Discussed above plan in detail with primary team     SUBJECTIVE:  Patient seen and examined at bedside  She overall remains confused  Developed hypoxic respiratory failure now requiring 6 L O2 via nasal cannula overnight      OBJECTIVE:  Current Weight: Weight - Scale: 67 kg (147 lb 11 3 oz)  Vitals:    09/30/22 0730   BP: 139/95   Pulse: (!) 123   Resp: 19   Temp: (!) 96 9 °F (36 1 °C)   SpO2: 91%       Intake/Output Summary (Last 24 hours) at 9/30/2022 1023  Last data filed at 9/29/2022 2326  Gross per 24 hour   Intake 990 ml   Output --   Net 990 ml     Wt Readings from Last 3 Encounters:   09/30/22 67 kg (147 lb 11 3 oz)   09/12/22 58 5 kg (129 lb)   08/10/22 58 5 kg (129 lb)     Temp Readings from Last 3 Encounters:   09/30/22 (!) 96 9 °F (36 1 °C)   09/12/22 99 8 °F (37 7 °C) (Tympanic)   07/25/22 99 7 °F (37 6 °C) (Tympanic)     BP Readings from Last 3 Encounters:   09/30/22 139/95   09/12/22 90/52   08/10/22 126/86     Pulse Readings from Last 3 Encounters:   09/30/22 (!) 123   09/12/22 72   08/10/22 (!) 122        Physical Examination:  General:  Lying in bed, mild acute distress   Eyes:  Mild conjunctival pallor present  ENT:  External examination of ears and nose unremarkable  Neck:  No obvious lymphadenopathy appreciated  Respiratory:  Decreased breath sound at bases  CVS:  S1, S2 present  GI:  Soft, nondistended  CNS:  Active alert, overall confused  Skin:  No new rash  Musculoskeletal:  No obvious new gross deformity noted    Medications:    Current Facility-Administered Medications:   •  acetaminophen (TYLENOL) tablet 650 mg, 650 mg, Oral, Q6H PRN, Atul Adan, , 650 mg at 09/22/22 1040  •  bumetanide (BUMEX) injection 1 mg, 1 mg, Intravenous, Once, Sam Dominique , DO  •  ceFAZolin (ANCEF) IVPB (premix in dextrose) 1,000 mg 50 mL, 1,000 mg, Intravenous, Q24H, Álvaro Knox PA-C, Last Rate: 100 mL/hr at 09/29/22 1739, 1,000 mg at 09/29/22 1739  •  gabapentin (NEURONTIN) oral solution 100 mg, 100 mg, Oral, TID, Harrington Cos, DO, 100 mg at 09/30/22 2755  •  heparin (porcine) subcutaneous injection 5,000 Units, 5,000 Units, Subcutaneous, Q8H [facility], Atulpantera Adan DO, 5,000 Units at 09/30/22 0530  •  HYDROmorphone (DILAUDID) injection 0 5 mg, 0 5 mg, Intravenous, Q4H PRN, Otoole Yuliana, DO, 0 5 mg at 09/27/22 1116  •  levalbuterol (XOPENEX) inhalation solution 0 63 mg, 0 63 mg, Nebulization, Q4H PRN, CARLITOS Scales, 0 63 mg at 09/29/22 2252  •  nicotine (NICODERM CQ) 14 mg/24hr TD 24 hr patch 1 patch, 1 patch, Transdermal, Daily, Atul Adan DO, 1 patch at 09/28/22 0711  •  OLANZapine (ZyPREXA ZYDIS) dispersible tablet 5 mg, 5 mg, Oral, Q6H PRN, Harrington Cos, DO  •  ondansetron [facility]) injection 4 mg, 4 mg, Intravenous, Q4H PRN, Otoole Yuliana, DO  •  QUEtiapine (SEROquel) tablet 25 mg, 25 mg, Oral, BID, Sam Dominique , DO, 25 mg at 09/30/22 4218  •  thiamine (VITAMIN B1) 500 mg in sodium chloride 0 9 % 50 mL IVPB, 500 mg, Intravenous, TID, Harrington Cos, DO, Last Rate: 100 mL/hr at 09/30/22 0923, 500 mg at 09/30/22 9132  •  vancomycin (VANCOCIN) oral solution 125 mg, 125 mg, Oral, Q12H [facility], Andre Iglesias DO, 125 mg at 09/30/22 3072    Laboratory Results:  Results from last 7 days   Lab Units 09/30/22  0137 09/29/22  0513 09/28/22  0542 09/27/22  0557 09/26/22  0454 09/26/22  0452 09/25/22  0551 09/24/22  0548   WBC Thousand/uL 14 78* 9 27 8 58 11 66*  --  11 15*  --  7 44   HEMOGLOBIN g/dL 9 8* 9 7* 11 5 12 2  --  12 5  --  11 1*   HEMATOCRIT % 31 0* 30 4* 36 2 37 6  --  39 0  --  35 4   PLATELETS Thousands/uL 280 282 293 308  --  310  --  297   SODIUM mmol/L 145 145 144 140 142  --  143 140   POTASSIUM mmol/L 3 8 3 2* 3 3* 4 0 3 6  --  4 3 4 6   CHLORIDE mmol/L 104 103 103 100 104  --  107 108   CO2 mmol/L 30 30 29 24 22  --  20* 18*   BUN mg/dL 40* 41* 48* 50* 53*  --  51* 52*   CREATININE mg/dL 6 37* 6 45* 6 94* 7 08* 7 63*  --  7 67* 7 55*   CALCIUM mg/dL 9 1 8 9 8 9 8 8 9 1  --  8 4 8 2*   MAGNESIUM mg/dL  --  1 8  --   --   --   --   --   --        XR chest portable   Final Result by Nicolas Cardenas MD (09/30 1361)      There is new bilateral central airspace opacities, left greater than right, which could be pulmonary edema or pneumonia  Workstation performed: [id number]         VAS lower limb venous duplex study, unilateral/limited   Final Result by Virginia Frank MD (09/25 2224)      MRI brain wo contrast   Final Result by [facility],  (09/23 1510)      No evidence of recent infarct  No mass effect or midline shift  Study performed in the limited fashion with extensive motion artifact  Workstation performed: [id number]         US right upper quadrant with liver dopplers   Final Result by Nimo Strong MD (09/20 1326)      1  Minimal layering sludge without evidence of acute cholecystitis  2   Normal-appearing liver with normal liver Dopplers  Workstation performed: [id number]         VAS lower limb venous duplex study, unilateral/limited   Final Result by Virginia Frank MD (09/19 2204)      CT lower extremity wo contrast right   Final Result by Nicolas Cardenas MD (09/19 1535)      There is no evidence of intramuscular hematoma or other mass lesion in the right calf  Please note that rhabdomyolysis and compartment syndrome are clinical diagnoses, and are not excluded based on these imaging findings           Workstation performed: [id number]         CT chest abdomen pelvis wo contrast   Final Result by Kaylee Clement MD (09/19 1301)      Bilateral groundglass opacities with superimposed inter and intralobular septal thickening  Differential diagnosis includes pulmonary hemorrhage, infection, and pulmonary edema, although the distribution is atypical for edema  The study was marked in [facility] for immediate notification  Workstation performed: [id number]         XR ankle 3+ views RIGHT   Final Result by Genie Qiu MD (09/19 1009)      No acute osseous abnormality  Workstation performed: [id number]         XR foot 3+ views RIGHT   Final Result by Genie Qiu MD (09/19 1009)      No acute osseous abnormality  Workstation performed: [id number]         XR hip/pelv 2-3 vws right   Final Result by Genie Qiu MD (09/19 1007)      No acute osseous abnormality  Workstation performed: [id number]         XR chest 1 view   Final Result by Genie Qiu MD (09/19 1008)      No acute cardiopulmonary disease  Findings are stable            Workstation performed: [id number]         CT head without contrast   Final Result by Imelda Hernández MD (09/19 6807)      No acute intracranial process  No skull fracture  Workstation performed: [id number]         CT spine cervical without contrast   Final Result by Imelda Hernández MD (09/19 6760)      No cervical spine fracture or traumatic malalignment  Incidental finding of partially visualized subpleural groundglass opacity in the left pulmonary apex presumably scarring  Cannot exclude infiltrate  This study demonstrates a significant  finding and was documented as such in [facility] for liaison and referring practitioner notification  Workstation performed: [id number]             Portions of the record may have been created with voice recognition software  Occasional wrong word or "sound a like" substitutions may have occurred due to the inherent limitations of voice recognition software   Read the chart carefully and recognize, using context, where substitutions have occurred

## 2022-09-30 NOTE — ASSESSMENT & PLAN NOTE
· Secondary to fall, patient had been laying in bed for approximately 4 days and family members had thought she was taking a deep nap  · She was subsequently brought to the hospital and found to be in acute rhabdomyolysis    · Had been on multiple sedating medications including MS Contin as well as gabapentin, which likely were contributing to sedation  · Was started on aggressive IV hydration, still with acute kidney injury  ·     Results from last 7 days   Lab Units 09/29/22  0513 09/27/22  0557 09/26/22  0454 09/25/22  0551 09/24/22  0548   CK TOTAL U/L 624* 848* 990* 793* 905*

## 2022-09-30 NOTE — UTILIZATION REVIEW
Continued Stay Review    Date: *9/30/22                        Current Patient Class: IP  Current Level of Care: MS    HPI:52 y o  female initially admitted on 9/19 -   SIRS vs Sepsis, MSSA bacteremia, tox met encephalopathy, ARF, severe rhabdo post fall  Assessment/Plan:   Overnight pt had deterioration index of 68 2% - she was on oxygen 6L NC with sats 93%  GCS 13, Midflow NC, pulse ox 80%, tachycardic 130, sBP 169, RR WNL  Had upper airway wheeze, cleared after breathing treatment  CXR donw which shows new bilateral central airspace opacities, left greater than right, which could be pulmonary edema or pneumonia  Treated with IV Lasix , increased restlessness and desats w/ movement  IV Lorazepam x 1  CKs trending down, had increased proBNP to >44,000  Has increased leukocytosis 14 75 up from WNL  She is intermittently tachycardic today and most recently on 4L [address]  She was hypoxic earlier to 89% and was on 8L [address]  BUN/Cr trending downward  Remains on IV antibiotics  On exam she is encephalopathic/confused  IV fluids d/c, Lasix d/c and she was treated with IV Bumex x1 with repeat later today if resp status does not improve  Remains on IV Thiamine TID        Vital Signs:   09/30/22 1032 -- -- -- -- -- -- 36 4 L/min Mid flow nasal cannula --   09/30/22 07:30:26 96 9 °F (36 1 °C) Abnormal  123 Abnormal  19 139/95 110 91 % -- -- -- --   09/30/22 0300 -- 88 -- -- -- 99 % 44 6 L/min Mid flow nasal cannula --   09/30/22 0200 -- -- -- -- -- 99 % 44 6 L/min None (Room air) --   09/30/22 0117 -- -- -- -- -- 95 % -- -- -- --   09/30/22 0110 -- -- -- -- -- -- 52 8 L/min Mid flow nasal cannula --   09/29/22 23:57:11 -- 125 Abnormal  -- 90/65 73 89 % Abnormal  -- -- -- --   09/29/22 23:17:15 97 8 °F (36 6 °C) -- -- -- -- -- -- -- -- --   09/29/22 2254 -- -- -- -- -- -- 52 8 L/min Mid flow nasal cannula --   09/29/22 22:52:59 -- 125 Abnormal  20 169/108 Abnormal  128 95 % -- -- -- --   09/29/22 2252 -- -- -- -- -- 92 % -- -- -- --   09/29/22 07:08:30 97 6 °F (36 4 °C) 108 Abnormal  18 161/109 Abnormal  126 93 % -- -- -- --   09/29/22 0701 -- -- -- -- -- -- -- -- None (Room air) --   09/28/22 20:40:35 99 8 °F (37 7 °C) 118 Abnormal  20 149/105 Abnormal  120 92 % -- -- None (Room air) Lying   09/28/22 15:02:01 97 4 °F (36 3 °C) Abnormal  105 18 153/96 115 96 % -- -- -- --   09/28/22 08:12:28 97 2 °F (36 2 °C) Abnormal  116 Abnormal  18 155/94 114 93 % -- -- -- --   09/28/22 0720 -- -- -- -- -- -- -- -- None (Room air) --     Pertinent Labs/Diagnostic Results:     9/29 CXR - There is new bilateral central airspace opacities, left greater than right, which could be pulmonary edema or pneumonia      9/27 Echo - Technically difficult but adequate transthoracic echocardiogram study      1  Normal left ventricular size and systolic function, probably normal diastolic function  Ejection fraction is estimated as around 63%  2  Normal right ventricular size and systolic function  3  Top normal right atrial cavity size  4  Aortic valve sclerosis, no aortic valve stenosis or regurgitation  5  Trace mitral valve regurgitation  6  Trace tricuspid and pulmonic valve regurgitation  7  No obvious pulmonary hypertension  8  No pericardial effusion    No previous echocardiogram is available for comparison  Results from last 7 days   Lab Units 09/30/22  0137 09/29/22  0513 09/28/22  0542 09/27/22  0557 09/26/22  0452 09/26/22  0452   WBC Thousand/uL 14 78* 9 27 8 58 11 66*  --  11 15*   HEMOGLOBIN g/dL 9 8* 9 7* 11 5 12 2  --  12 5   HEMATOCRIT % 31 0* 30 4* 36 2 37 6  --  39 0   PLATELETS Thousands/uL 280 282 293 308  --  310   NEUTROS ABS Thousands/µL 13 49* 6 91 6 70 10 30*   < >  --     < > = values in this interval not displayed           Results from last 7 days   Lab Units 09/30/22  0137 09/29/22  0513 09/28/22  0542 09/27/22  0557 09/26/22  0454   SODIUM mmol/L 145 145 144 140 142   POTASSIUM mmol/L 3 8 3 2* 3 3* 4 0 3  6   CHLORIDE mmol/L 104 103 103 100 104   CO2 mmol/L 30 30 29 24 22   ANION GAP mmol/L 11 12 12 16* 16*   BUN mg/dL 40* 41* 48* 50* 53*   CREATININE mg/dL 6 37* 6 45* 6 94* 7 08* 7 63*   EGFR ml/min/1 73sq m 6 6 6 6 5   CALCIUM mg/dL 9 1 8 9 8 9 8 8 9 1   MAGNESIUM mg/dL  --  1 8  --   --   --      Results from last 7 days   Lab Units 09/30/22  0137 09/25/22  0551 09/24/22  0548   AST U/L 68* 82* 111*   ALT U/L 62 215* 240*   ALK PHOS U/L 85 67 67   TOTAL PROTEIN g/dL 5 7* 5 1* 4 9*   ALBUMIN g/dL 2 4* 1 8* 1 7*   TOTAL BILIRUBIN mg/dL 0 40 0 42 0 41     Results from last 7 days   Lab Units 09/30/22  0024   POC GLUCOSE mg/dl 126     Results from last 7 days   Lab Units 09/30/22  0137 09/29/22  0513 09/28/22  0542 09/27/22  0557 09/26/22  0454 09/25/22  0551 09/24/22  0548   GLUCOSE RANDOM mg/dL 134 84 101 81 80 68 64*      Results from last 7 days   Lab Units 09/29/22  2302   PH ART  7 364   PCO2 ART mm Hg 42 7   PO2 ART mm Hg 86 0   HCO3 ART mmol/L 23 8   BASE EXC ART mmol/L -1 6   O2 CONTENT ART mL/dL 14 6*   O2 HGB, ARTERIAL % 95 2   ABG SOURCE  Radial, Right             Results from last 7 days   Lab Units 09/29/22  0513 09/27/22  0557 09/26/22  0454   CK TOTAL U/L 624* 848* 990*   CK MB INDEX % 13 8* 3 7* 5 8*   CK MB ng/mL 85 8* 31 2* 57 7*     Results from last 7 days   Lab Units 09/27/22  1834   LACTIC ACID mmol/L 1 5             Results from last 7 days   Lab Units 09/30/22  0137   NT-PRO BNP pg/mL 44,580*     Results from last 7 days   Lab Units 09/28/22  1145 09/28/22  1144 09/26/22  1818 09/23/22  1608   BLOOD CULTURE  No Growth at 24 hrs  No Growth at 24 hrs   Staphylococcus aureus*  Staphylococcus aureus*  --    GRAM STAIN RESULT   --   --  Gram positive cocci in clusters*  Gram positive cocci in clusters* No Polys or Bacteria seen         Results from last 7 days   Lab Units 09/23/22  1608   APPEARANCE CSF  clear   TUBE NUM CSF  4   WBC CSF /uL 0   XANTHOCHROMIA  No   GLUCOSE CSF mg/dL 54   PROTEIN CSF mg/dL 48*   RBC CSF uL 2         Medications:   Scheduled Medications:  cefazolin, 1,000 mg, Intravenous, Q24H  gabapentin, 100 mg, Oral, TID  heparin (porcine), 5,000 Units, Subcutaneous, Q8H [address]  nicotine, 1 patch, Transdermal, Daily  QUEtiapine, 25 mg, Oral, BID  thiamine, 500 mg, Intravenous, TID  vancomycin, 125 mg, Oral, Q12H [address]      Continuous IV Infusions:  IV NSS @ 75 ml/hr - d/c 9/30 @ 0649     PRN Meds:  acetaminophen, 650 mg, Oral, Q6H PRN  HYDROmorphone, 0 5 mg, Intravenous, Q4H PRN  levalbuterol, 0 63 mg, Nebulization, Q4H PRN -x 1 9/29  Lorazepam 0 5 mg IV q 6 hr PRN - x 2 9/29 and d/c   OLANZapine, 5 mg, Oral, Q6H PRN  ondansetron, 4 mg, Intravenous, Q4H PRN    Discharge Plan: subacute/SNF rehab    Network Utilization Review Department  ATTENTION: Please call with any questions or concerns to [phone] and carefully listen to the prompts so that you are directed to the right person  All voicemails are confidential   Mauri Garcia all requests for admission clinical reviews, approved or denied determinations and any other requests to dedicated fax number below belonging to the campus where the patient is receiving treatment   List of dedicated fax numbers for the Facilities:  [address] DENIALS (Administrative/Medical Necessity) [phone]   [address] (Maternity/NICU/Pediatrics) [phone]   [address] Minda Baldwin [phone]   [facility] [phone]   [facility] [phone]   1306 [facility] [address] [facility] [place] [address] Lizzie [address]     [place] [address] [phone]

## 2022-09-30 NOTE — QUICK NOTE
Asked by RN to see patient due to hypoxia and need for supplemental oxygen, placed on mid flow by RT  ABG and CXR were ordered  · CXR reviewed, appears like pulmonary edema on L  · On IVF for rhabdo, hold fluids  · Give 1 dose of IV Lasix  · Diffuse wheezing, give Hermila Salter neb  · Will check BNP  · Increased restlessness and agitation, desatting with movement   Will give low-dose IV lorazepam  · Okay to monitor on med [facility] floor

## 2022-09-30 NOTE — ASSESSMENT & PLAN NOTE
· Was on venlafaxine and quetiapine- has been refusing these medications since admission, part of this due to lethargy, part of it due to NPO status   ·   · Does have history of suicide attempt a currently does not show any signs of thoughts of self-harm  · Previously on Effexor, which patient has been refusing either spitting out medication or chewing  · Psychiatry consultation placed and patient case reviewed with on-call psychiatry  Placed on Seroquel 25 mg twice a day as mood stabilizer for current agitation  This may be further titrated to effect  · Discontinued Ativan for agitation and will place on Zyprexa 5 mg by mouth every 6 hours

## 2022-09-30 NOTE — ASSESSMENT & PLAN NOTE
· Lumbar radiculopathy due for surgery  · Prior to admission was on gabapentin and morphine IR 15 mg   · Confirmed on PDMP  · Gabapentin resumed at lower dose

## 2022-09-30 NOTE — CONSULTS
Consult for TF recs  Jevity 1 5 just ordered at @20mL/hr, one pack of prosoure, water flushes 100mL every 6hrs  Recommend advancing TF by 10mL every 6hrs to goal of 50mL/hr, this would provide a total 1840cal, 76g pro, 1312mL  Will continue to monitor po intake and need for TF adjustments

## 2022-09-30 NOTE — TELEPHONE ENCOUNTER
I reach out to pt 's  ( Ethan Omer ) to make him aware you are not in the office today and asked if I am able to help   Ricki states pt is in the Hospital after a fall at home and a change in mental status  Ricki is very concerned as pt has been in the Hospital since 9/19 and she is not getting any better and he is getting a lot of different information from many different providers but none of it lines up with what he was just told by the previous Doctors  Ricki is very concerned for pt and he would like to know if you would be willing to reach out to him via Cell phone [phone] or if it would be more convenient for you via e-mail at [email or web address]  [email or web address]  He would just like someone with a Medical degree that is not currently treating her in the Hospital to review her records and help him either decide what would be best for patient or maybe give him insight on the appropriate questions to be asking to make sure his wife is getting the appropriate medical treatment

## 2022-09-30 NOTE — SPEECH THERAPY NOTE
Speech Language/Pathology    Speech/Language Pathology Progress Note    Patient Name: Nichole Yu  Today's Date: 2022     Problem List  Principal Problem:    Encephalopathy acute  Active Problems:    Depression    Tobacco abuse    DDD (degenerative disc disease), lumbosacral    ARF (acute renal failure) (Formerly [facility])    Transaminitis    Abnormal CT of the chest    Traumatic rhabdomyolysis ([facility])    D-dimer, elevated    Leg edema, right    Localized swelling of right upper extremity    Bacteremia due to methicillin susceptible Staphylococcus aureus (MSSA)       Past Medical History  Past Medical History:   Diagnosis Date   • Chronic pain disorder    • Depression    • GERD (gastroesophageal reflux disease)    • History of electroconvulsive therapy    • Low back pain    • Self-injurious behavior    • Suicide attempt [facility])         Past Surgical History  Past Surgical History:   Procedure Laterality Date   •  SECTION     • COLONOSCOPY     • PANCREAS SURGERY      "pseudocysts" per patient's  Ricki Infante   • TX ESOPHAGOGASTRODUODENOSCOPY TRANSORAL DIAGNOSTIC N/A 4/10/2018    Procedure: EGD AND COLONOSCOPY;  Surgeon: Sarah Mae MD;  [place] [facility]; Service: Gastroenterology         Subjective:  Pt not verbalizing today  Remains in restraints  Eyes closed for session  Objective:  Seen for po tolerance and additional recommendations  Reportedly w/ decline in o2 sats ~ 1am  ? Per cardiology note:  Assessment/Recommendations/Discussion:   1  MSSA bacteremia 2/2 blood cultures positive  2  Acute respiratory failure, acute diastolic heart failure due to volume overload  3  Toxic metabolic encephalopathy  4  Acute renal failure presumed due to ATN and severe rhabdomyolysis  5  Rhabdomyolysis  6  Depression  ·     PLAN  · She has diffuse wheezing and her respiratory status is unstable, cannot do CHERYLE today    She was receiving aggressive IV fluids given her severe rhabdomyolysis and acute renal failure but unfortunately her respiratory status has subsequently decompensated  · BNP is 44,580  · Will need aggressive diuresis and or possibly hemodialysis if worsening given her poor underlying renal function  Creatinine is slowly improving, but is still 6 3 today  · She is given IV Bumex 2 mg earlier, likely needs another dose this afternoon  Using BiPAP in her situation -with wrist restraints in place- would not be indicated  If respiratory status worsens may ultimately need intubation and or hemodialysis  Pt was on 8L midlfow this am,  whn I checked on her   Diet order was placed back in  I asked to have the lunch tray held  ST to see  She was on 4L Midflow when I returned (pt was given lasix during session also)   at bedside  I spoke w/ him re her poor intake for several days and that staff have been trying their best to feed her  Today I was able to get her to take a small amt of the magic cup w/ the prebiotic mixed in  Cued to open mouth, transfer as needed  Swallows appeared prompt  Pt eventually started to become more lethargic and po was dc'd   in agreement  Discussed possibly temporary nutriution via ngt  Assessment:  Increased o2 need, decline in status  Currently not awake enough to continue w/ PO  Just spoke w/ OT who stated pt was lethargic for session  Plan/Recommendations:  NPO w NGT or keofed  Spoke w/ SLIM     PLEASE DO FREQUENT ORAL CARE

## 2022-09-30 NOTE — ASSESSMENT & PLAN NOTE
· Secondary to rhabdomyolysis  No evidence of liver injury on CT imaging  · Seen by GI  Hepatic duplex also negative      Results from last 7 days   Lab Units 09/30/22  0137 09/25/22  0551 09/24/22  0548   AST U/L 68* 82* 111*   ALT U/L 62 215* 240*   TOTAL BILIRUBIN mg/dL 0 40 0 42 0 41

## 2022-09-30 NOTE — PLAN OF CARE
Problem: OCCUPATIONAL THERAPY ADULT  Goal: Performs self-care activities at highest level of function for planned discharge setting  See evaluation for individualized goals  Description: Treatment Interventions: ADL retraining, Functional transfer training, UE strengthening/ROM, Endurance training, Cognitive reorientation, Patient/family training, Equipment evaluation/education, Neuromuscular reeducation, Compensatory technique education, Continued evaluation          See flowsheet documentation for full assessment, interventions and recommendations  Note: Limitation: Decreased ADL status, Decreased Safe judgement during ADL, Decreased cognition, Decreased endurance, Decreased high-level ADLs     Assessment: Pt seen for 25min tx session with focus on functional balance, functional mobility, ADL status, transfer safety, and cognition  Pt able to tolerate OOB mobility; sitting balance=f/f-, standing balance=f-/p+  Pt required heavy assistance with all functional mobility tasks  Pt able to assist with mobility, but non-verbal and unable to follow one-step verbal commands  Pt keeping eyes closed for most of the tx session  Pt able to demonstrate good AROM/strength in b/l UE's, but resistive moreso with L UE  Cognitive deficits noted--direction-following, attention, problem-solving  Will continue to attempt progress pt towards initially stated goals       OT Discharge Recommendation: Post acute rehabilitation services

## 2022-09-30 NOTE — OCCUPATIONAL THERAPY NOTE
Occupational Therapy Progress Note(time=1145-1210)     Patient Name: Larry Powell  Today's Date: 9/30/2022  Problem List  Principal Problem:    Encephalopathy acute  Active Problems:    Depression    Tobacco abuse    DDD (degenerative disc disease), lumbosacral    ARF (acute renal failure) (HCC)    Transaminitis    Abnormal CT of the chest    Traumatic rhabdomyolysis (HCC)    D-dimer, elevated    Leg edema, right    Localized swelling of right upper extremity    Bacteremia due to methicillin susceptible Staphylococcus aureus (MSSA)    Pulmonary edema            09/30/22 1145   Note Type   Note Type Treatment   Restrictions/Precautions   Weight Bearing Precautions Per Order No   Other Precautions Cognitive; Chair Alarm; Bed Alarm; Restraints   Pain Assessment   Pain Rating: FLACC (Rest) - Face 0   Pain Rating: FLACC (Rest) - Legs 0   Pain Rating: FLACC (Rest) - Activity 0   Pain Rating: FLACC (Rest) - Cry 0   Pain Rating: FLACC (Rest) - Consolability 0   Score: FLACC (Rest) 0   ADL   Where Assessed Edge of bed   UB Dressing Assistance 1  Total Assistance   UB Dressing Deficit Thread RUE; Thread LUE; Increased time to complete   Functional Standing Tolerance   Time 1-2mins   Bed Mobility   Rolling R 3  Moderate assistance   Additional items Assist x 2; Increased time required;Verbal cues   Rolling L 3  Moderate assistance   Additional items Assist x 2; Increased time required;LE management   Supine to Sit 3  Moderate assistance   Additional items Assist x 2; Increased time required   Sit to Supine 3  Moderate assistance   Additional items Assist x 2; Increased time required;Verbal cues;LE management   Transfers   Sit to Stand 3  Moderate assistance   Additional items Assist x 2; Increased time required   Stand to Sit 3  Moderate assistance   Additional items Assist x 2; Increased time required;Verbal cues   Functional Mobility   Functional Mobility 3  Moderate assistance   Additional Comments x2   Additional items Hand hold assistance   Cognition   Overall Cognitive Status Impaired   Arousal/Participation Arousable;Lethargic   Attention Difficulty attending to directions   Orientation Level Disoriented to person;Disoriented to place; Disoriented to time;Disoriented to situation   Memory Decreased short term memory;Decreased recall of recent events;Decreased recall of precautions   Following Commands Unable to follow one step commands   Activity Tolerance   Activity Tolerance Patient limited by fatigue   Medical Staff Made Aware nsg, P T  Assessment   Assessment Pt seen for 25min tx session with focus on functional balance, functional mobility, ADL status, transfer safety, and cognition  Pt able to tolerate OOB mobility; sitting balance=f/f-, standing balance=f-/p+  Pt required heavy assistance with all functional mobility tasks  Pt able to assist with mobility, but non-verbal and unable to follow one-step verbal commands  Pt keeping eyes closed for most of the tx session  Pt able to demonstrate good AROM/strength in b/l UE's, but resistive moreso with L UE  Cognitive deficits noted--direction-following, attention, problem-solving  Will continue to attempt progress pt towards initially stated goals  Plan   Treatment Interventions ADL retraining;Functional transfer training;UE strengthening/ROM; Endurance training;Cognitive reorientation;Patient/family training;Equipment evaluation/education; Neuromuscular reeducation; Compensatory technique education;Continued evaluation   Goal Expiration Date 10/04/22   OT Treatment Day 2   OT Frequency 3-5x/wk   Recommendation   OT Discharge Recommendation Post acute rehabilitation services   AM-PAC Daily Activity Inpatient   Lower Body Dressing 1   Bathing 1   Toileting 1   Upper Body Dressing 1   Grooming 1   Eating 1   Daily Activity Raw Score 6   Turning Head Towards Sound 1   Follow Simple Instructions 1   Low Function Daily Activity Raw Score 8   Low Function Daily Activity Standardized Score 12 06   AM-PAC Applied Cognition Inpatient   Following a Speech/Presentation 1   Understanding Ordinary Conversation 1   Taking Medications 1   Remembering Where Things Are Placed or Put Away 1   Remembering List of 4-5 Errands 1   Taking Care of Complicated Tasks 1   Applied Cognition Raw Score 6   Applied Cognition Standardized Score 7 69   Ella Hudson

## 2022-09-30 NOTE — PLAN OF CARE
Problem: PHYSICAL THERAPY ADULT  Goal: Performs mobility at highest level of function for planned discharge setting  See evaluation for individualized goals  Description: Treatment/Interventions: Functional transfer training, LE strengthening/ROM, Elevations, Therapeutic exercise, Cognitive reorientation, Patient/family training, Equipment eval/education, Bed mobility, Gait training, Compensatory technique education, Continued evaluation, Spoke to nursing, Spoke to case management, Spoke to MD, OT, ST  Equipment Recommended: Silviano Kang (if patient does not own)       See flowsheet documentation for full assessment, interventions and recommendations  Note: Prognosis: Guarded  Problem List: Decreased strength, Decreased endurance, Impaired balance, Decreased mobility, Decreased cognition, Impaired judgement, Decreased safety awareness  Assessment: Pt seen for PT per POC  Pt w/ severely impaired cognition  Pt now on B/L wrist restraints  Pt writhing & restless in bed  Pt awake but eyes closed t/o session  No purposely movements noted  Pt require modAx2 for most functional mobility + cues for techniques & safety  Gait deviations as above w/ significant R knee buckling noted  Pt tolerated above mentioned thera  ex well  Pt require cues for proper exercise techniques and performance  Pt unable to follow commands but somewhat just going along w/ the activities presented  Pt calm & not resisting or combative t/o session  Please see flowsheet above for objective findings & levels of assistance required for all functional tasks during this tx session  Nsg staff most recent vital signs as follows: BP (!) 163/104   Pulse (!) 117   Temp (!) 96 3 °F (35 7 °C)   Resp 18   Ht 5' 6" (1 676 m)   Wt 67 kg (147 lb 11 3 oz)   LMP 03/14/2019 (Approximate)   SpO2 95%   BMI 23 84 kg/m²   Will continue PT per POC  PT goals & tx frequency updated, please see below for details   At end of session, pt back in bed in stable condition, call bell & phone in reach, bed alarm activated, B/L soft wrist restraints reapplied  Fall precautions reinforced w/ good understanding  The patient's AM-PAC Basic Mobility Inpatient Short Form Raw Score is 11  A Raw score of less than or equal to 16 suggests the patient may benefit from discharge to post-acute rehabilitation services  Please also refer to the recommendation of the Physical Therapist for safe discharge planning  From PT standpoint, will continue to recommend inpt rehab at D/C  CM to follow  Nsg staff to continue to mobilized pt as tolerated to prevent decline in function  Nsg notified  PT Discharge Recommendation: Post acute rehabilitation services    See flowsheet documentation for full assessment

## 2022-09-30 NOTE — ASSESSMENT & PLAN NOTE
Patient with flash pulmonary edema overnight in the setting of aggressive IV fluid as well as renal dysfunction  Given Bumex 2 mg x 1 with good urine output  Will re-dose this afternoon, with net negative goal 1-2 L  Repeat chest radiograph in a m   Echocardiogram with preserved ejection fraction  High risk situation

## 2022-09-30 NOTE — QUICK NOTE
QUICK NOTE - Deterioration Index  Bren Hammond 45 y o  female MRN: [id number]  Unit/Bed#: [place] [address] 87 217-01 Encounter: [id number]    Date Paged: 22  Time Paged: 1157  Room #: [address] [place] [address] Time: 1200  Deterioration index score at time of page: 68 2%  Spoke with nurse, nurse supervisor and slim practioner from primary team  Need to escalate level of care: no     PROBLEMS resulting in high DI score:   • Gcs13  • Midflow nc  • Pulse ox 80  • Pulse 130  • sbp 169  • rr 20    PLAN:    • Pt on 6L with o2 sats 93%  Pt with upper airway wheeze  Lungs clear b/l s/p breathing tx  cxr done and results pending  May benefit from lasix  Unable to get accurate I/os 2/2 incontinence  Pt on ivfs at 75ml/hr  Pt would need potassium replacement and f/u if lasix given  Pt with continuous movement and received ativan and seroquel t/o day  Pt has been refusing some meds  Psych and primary team following  Pt will cont tx for mssa infxn  Will continue to monitor patient  At this time she will be monitored by primary team      Please contact critical care via [employer] with any questions or concerns       Vitals:   Vitals:    22 2252 22 2252 22 2317 22 2357   BP:  (!) 169/108  90/65   BP [place]       Pulse:  (!) 125  (!) 125   Resp:  20     Temp:   97 8 °F (36 6 °C)    TempSrc:       SpO2: 92% 95%  (!) 89%   Weight:       Height:           Respiratory:  SpO2: SpO2: (!) 89 %, SpO2 Activity: SpO2 Activity: At Rest, SpO2 Device: O2 Device: Mid flow nasal cannula  Nasal Cannula O2 Flow Rate (L/min): 8 L/min    Temperature: Temp (24hrs), Av 7 °F (36 5 °C), Min:97 6 °F (36 4 °C), Max:97 8 °F (36 6 °C)  Current: Temperature: 97 8 °F (36 6 °C)    Labs:   Results from last 7 days   Lab Units 22  0513 22  0542 22  0557   WBC Thousand/uL 9 27 8 58 11 66*   HEMOGLOBIN g/dL 9 7* 11 5 12 2   HEMATOCRIT % 30 4* 36 2 37 6   PLATELETS Thousands/uL 282 293 308   NEUTROS PCT % 75 78* 88*   MONOS PCT % 7 7 4     Results from last 7 days   Lab Units 09/29/22  0513 09/28/22  0542 09/27/22  0557 09/26/22  0454 09/25/22  0551 09/24/22  0548 09/23/22  0824 09/23/22  0530   SODIUM mmol/L 145 144 140   < > 143 140   < > 138   POTASSIUM mmol/L 3 2* 3 3* 4 0   < > 4 3 4 6   < > 4 7   CHLORIDE mmol/L 103 103 100   < > 107 108   < > 104   CO2 mmol/L 30 29 24   < > 20* 18*   < > 20*   BUN mg/dL 41* 48* 50*   < > 51* 52*   < > 52*   CREATININE mg/dL 6 45* 6 94* 7 08*   < > 7 67* 7 55*   < > 7 15*   CALCIUM mg/dL 8 9 8 9 8 8   < > 8 4 8 2*   < > 8 1*   ALK PHOS U/L  --   --   --   --  67 67  --  69   ALT U/L  --   --   --   --  215* 240*  --  292*   AST U/L  --   --   --   --  82* 111*  --  167*    < > = values in this interval not displayed       Results from last 7 days   Lab Units 09/29/22  0513   MAGNESIUM mg/dL 1 8     Results from last 7 days   Lab Units 09/27/22  1834   LACTIC ACID mmol/L 1 5               Code Status: Level 1 - Full Code

## 2022-09-30 NOTE — PHYSICAL THERAPY NOTE
PT PROGRESS NOTE    Name: Marino Pascual  AGE: 45 y o    MRN: [id number]  LENGTH OF STAY: 11    Admitting Dx:  Hyponatremia [E87 1]  Altered mental status [R41 82]  ARF (acute renal failure) ([facility]) [N17 9]  Hypoglycemia [E16 2]  Transaminitis [R74 01]  Elevated liver enzymes [R74 8]  Positive D dimer [R79 89]  ANUJA (acute kidney injury) ([facility] [address] ) [N17 9]  Swelling of right foot [M79 89]      Patient Active Problem List   Diagnosis    Irritable bowel syndrome with diarrhea    Hypertension    Hyperlipidemia    Depression    COPD (chronic obstructive pulmonary disease) ([facility])    Generalized anxiety disorder    Right knee pain    History of rib fracture    Tobacco abuse    Chondromalacia patellae    DDD (degenerative disc disease), lumbosacral    Insomnia    Hematochezia    Intercostal neuralgia    Ischemic colitis ([facility] [address] )    Internal hemorrhoids    Adnexal cyst    Chronic right shoulder pain    Opioid dependence ([facility])    Hyperglycemia    ARF (acute renal failure) ([facility])    Transaminitis    Encephalopathy acute    Abnormal CT of the chest    Traumatic rhabdomyolysis ([facility])    D-dimer, elevated    Leg edema, right    Localized swelling of right upper extremity    Bacteremia due to methicillin susceptible Staphylococcus aureus (MSSA)    Pulmonary edema               09/30/22 1214   PT Last Visit   PT Visit Date 09/30/22   Note Type   Note Type Treatment   Pain Assessment   Pain Assessment Tool FLACC   Pain Rating: FLACC (Rest) - Face 0   Pain Rating: FLACC (Rest) - Legs 1   Pain Rating: FLACC (Rest) - Activity 0   Pain Rating: FLACC (Rest) - Cry 0   Pain Rating: FLACC (Rest) - Consolability 0   Score: FLACC (Rest) 1   Pain Rating: FLACC (Activity) - Face 0   Pain Rating: FLACC (Activity) - Legs 1   Pain Rating: FLACC (Activity) - Activity 0   Pain Rating: FLACC (Activity) - Cry 1   Pain Rating: FLACC (Activity) - Consolability 0   Score: FLACC (Activity) 2   Restrictions/Precautions   Weight Bearing Precautions Per Order No Other Precautions Cognitive; Chair Alarm; Bed Alarm; Restraints; Fall Risk  (masimo)   General   Chart Reviewed Yes   Response to Previous Treatment Patient unable to report, no changes reported from family or staff   Family/Caregiver Present No   Cognition   Overall Cognitive Status Impaired   Arousal/Participation Arousable   Attention Difficulty attending to directions   Orientation Level Disoriented to person;Disoriented to place; Disoriented to time;Disoriented to situation   Following Commands Follows one step commands inconsistently   Comments pt not verbally responding, pt awake but eyes closed t/o session; writhing; restless   Subjective   Subjective None stated  Pt not responding to questions &/or commands   Bed Mobility   Supine to Sit 3  Moderate assistance   Additional items Assist x 2;HOB elevated; Bedrails; Increased time required;Verbal cues;LE management   Sit to Supine 3  Moderate assistance   Additional items Assist x 2; Increased time required;Verbal cues;LE management   Additional Comments cues for techniques & safety; pt not resisting activity; pt able to long sit w/ minAx1   Transfers   Sit to Stand 3  Moderate assistance   Additional items Assist x 2; Increased time required;Verbal cues   Stand to Sit 3  Moderate assistance   Additional items Assist x 2; Increased time required;Verbal cues   Additional Comments cues for techniques & safety; hand held assistance   Ambulation/Elevation   Gait pattern R Knee Vani;Decreased foot clearance;Narrow SOY;Shuffling; Step to;Excessively slow; Ataxia   Gait Assistance 3  Moderate assist   Additional items Assist x 2;Verbal cues; Tactile cues   Assistive Device Other (Comment)  (hand held assistance)   Distance 2'x1 lateral steps to [facility]   Ambulation/Elevation Additional Comments pt unable to follow commands for RW use hence provided hand held assistance; (+) R knee buckling requires blocking; ataxic gait   Balance   Static Sitting Fair -   Dynamic Sitting Poor + Static Standing Poor   Dynamic Standing Poor -   Ambulatory Poor -   Activity Tolerance   Activity Tolerance Treatment limited secondary to medical complications (Comment)   Medical Staff Made Aware OTR Amrit   Nurse Made Aware CHRISTIANO Troy   Exercises   Heelslides Supine;10 reps;PROM; Bilateral   Hip Flexion Supine;10 reps;PROM; Bilateral   Hip Abduction Supine;10 reps;PROM; Bilateral   Hip Adduction Supine;10 reps;PROM; Bilateral   Knee AROM Short Arc Quad Supine;10 reps;PROM; Bilateral   Ankle Pumps Supine;10 reps;AROM; Bilateral   Balance training  pt able to sit at EOB approx  5mins; standing lateral wt shifting   Assessment   Prognosis Guarded   Problem List Decreased strength;Decreased endurance; Impaired balance;Decreased mobility; Decreased cognition; Impaired judgement;Decreased safety awareness   Assessment Pt seen for PT per POC  Pt w/ severely impaired cognition  Pt now on B/L wrist restraints  Pt writhing & restless in bed  Pt awake but eyes closed t/o session  No purposely movements noted  Pt require modAx2 for most functional mobility + cues for techniques & safety  Gait deviations as above w/ significant R knee buckling noted  Pt tolerated above mentioned thera  ex well  Pt require cues for proper exercise techniques and performance  Pt unable to follow commands but somewhat just going along w/ the activities presented  Pt calm & not resisting or combative t/o session  Please see flowsheet above for objective findings & levels of assistance required for all functional tasks during this tx session  Nsg staff most recent vital signs as follows: BP (!) 163/104   Pulse (!) 117   Temp (!) 96 3 °F (35 7 °C)   Resp 18   Ht 5' 6" (1 676 m)   Wt 67 kg (147 lb 11 3 oz)   LMP 03/14/2019 (Approximate)   SpO2 95%   BMI 23 84 kg/m²   Will continue PT per POC  PT goals & tx frequency updated, please see below for details   At end of session, pt back in bed in stable condition, call bell & phone in reach, bed alarm activated, B/L soft wrist restraints reapplied  Fall precautions reinforced w/ good understanding  The patient's AM-PAC Basic Mobility Inpatient Short Form Raw Score is 11  A Raw score of less than or equal to 16 suggests the patient may benefit from discharge to post-acute rehabilitation services  Please also refer to the recommendation of the Physical Therapist for safe discharge planning  From PT standpoint, will continue to recommend inpt rehab at D/C  CM to follow  Nsg staff to continue to mobilized pt as tolerated to prevent decline in function  Nsg notified  Goals   Patient Goals none stated 2* to impaired cognition   STG Expiration Date 10/14/22   Short Term Goal #1 Goals to be met in 14 days; pt will be able to: 1) inc strength & balance by 1/2 grade to improve overall functional mobility & dec fall risk; 2) inc bed mobility to minAx1 for pt to be able to get in/OOB safely w/ proper techniques 100% of the time, to dec caregiver burden & safely function at home; 3) inc transfers to minAx1 for pt to transition safely from one surface to another w/o % of the time, to dec caregiver burden & safely function at home; 4) inc amb w/ appropriate AD approx  >50' w/ minAx1 for pt to ambulate as tolerated w/o any % of the time, to dec caregiver burden & safely function at home; 5) negotiate stairs w/ minAx1 for inc safety during stair mgt inside/outside of home & dec caregiver burden; 6) pt/caregiver ed   PT Treatment Day 4   Plan   Treatment/Interventions Functional transfer training;LE strengthening/ROM; Therapeutic exercise; Endurance training;Patient/family training;Bed mobility;Gait training;Spoke to nursing;OT;Spoke to case management   Progress Slow progress, cognitive deficits   PT Frequency 3-5x/wk   Recommendation   PT Discharge Recommendation Post acute rehabilitation services   AM-PAC Basic Mobility Inpatient   Turning in Bed Without Bedrails 2   Lying on Back to Sitting on Edge of Flat Bed 2 Moving Bed to Chair 2   Standing Up From Chair 2   Walk in Room 2   Climb 3-5 Stairs 1   Basic Mobility Inpatient Raw Score 11   Basic Mobility Standardized Score 30 25   Highest Level Of Mobility   -[facility] Goal 4: Move to chair/commode   -[facility] Achieved 5: Stand (1 or more minutes)   Education   Education Provided Mobility training;Home exercise program;Assistive device   Patient Reinforcement needed   End of Consult   Patient Position at End of Consult Supine;Bed/Chair alarm activated; All needs within reach   End of Consult Comments Pt in stable condition  All needs in reach  B/L soft wrist restraints re-applied     Dalia Triplett

## 2022-09-30 NOTE — ASSESSMENT & PLAN NOTE
· Acute encephalopathy secondary to gabapentin and morphine with subsequent renal failure, potentially component of psychiatric illness, restarted back on Psych medications  · Likely a component of delirium as well given prolonged hospital stay  · Continue thiamine high-dose given potential concern for Wernicke's encephalopathy  · Holding morphine since admission due to over-sedation  · LP and MRI negative for acute pathology  · Found to be positive for methicillin sensitive Staph aureus- consideration for Toxic metabolic encephalopathy  · Restarted gabapentin - given concern for withdrawal  · Did give trial of hydromorphone given patient has previously been on high-dose pain medication    The patient herself endorses no pain  Start alternative nutrition hydration due to confusion, monitor for refeeding syndrome

## 2022-09-30 NOTE — PROGRESS NOTES
Progress Note - Cardiology   Glorious Roles 45 y o  female MRN: [id number]  Unit/Bed#: [place] [address] -01 Encounter: [id number]      Assessment/Recommendations/Discussion:   1  MSSA bacteremia 2/2 blood cultures positive  2  Acute respiratory failure, acute diastolic heart failure due to volume overload  3  Toxic metabolic encephalopathy  4  Acute renal failure presumed due to ATN and severe rhabdomyolysis  5  Rhabdomyolysis  6  Depression  •            PLAN  • She has diffuse wheezing and her respiratory status is unstable, cannot do CHERYLE today  She was receiving aggressive IV fluids given her severe rhabdomyolysis and acute renal failure but unfortunately her respiratory status has subsequently decompensated  • BNP is 44,580  • Will need aggressive diuresis and or possibly hemodialysis if worsening given her poor underlying renal function  Creatinine is slowly improving, but is still 6 3 today  • She is given IV Bumex 2 mg earlier, likely needs another dose this afternoon  Using BiPAP in her situation -with wrist restraints in place- would not be indicated  If respiratory status worsens may ultimately need intubation and or hemodialysis  Subjective:   HPI  She gives no history today although she does appear to be in respiratory distress, has diffuse wheezing bilaterally  Creatinine now at 6 37, proBNP is 48417      Review of Systems: As noted in HPI  Rest of ROS is negative      Vitals:   /95   Pulse (!) 123   Temp (!) 96 9 °F (36 1 °C)   Resp 19   Ht 5' 6" (1 676 m)   Wt 67 kg (147 lb 11 3 oz)   LMP 03/14/2019 (Approximate)   SpO2 91%   BMI 23 84 kg/m²   I/O       09/28 0701  09/29 0700 09/29 0701  09/30 0700 09/30 0701  10/01 0700    I V  (mL/kg)  990 (14 8)     Total Intake(mL/kg)  990 (14 8)     Net  +990            Unmeasured Urine Occurrence  1 x 1 x    Unmeasured Stool Occurrence  1 x         Weight (last 2 days)     Date/Time Weight    09/30/22 0600 67 (147 71)    09/29/22 0600 67 8 (149 47)    09/28/22 0600 67 8 (149 47)          Physical Exam   Constitutional:  She is encephalopathic and gives no history  Head: Normocephalic, without obvious abnormality, atraumatic  Eyes: conjunctivae clear and moist  Sclera anicteric  No xanthelasmas  Pupils equal bilaterally  Extraocular motions are full  Ear nose mouth and throat: ears are symmetrical bilaterally, hearing appears to be equal bilaterally, no nasal discharge or epistaxis, oropharynx is clear with moist mucous membranes  Neck:  Trachea is midline, neck is supple, no thyromegaly or significant lymphadenopathy, there is full range of motion    Lungs:  She has diffuse wheezing bilaterally  Heart: regular rate and rhythm, S1, S2 normal, no murmur, no click, no rub and no gallop, no lower extremity edema  Abdomen: soft, non-tender; bowel sounds normal; no masses,  no organomegaly  Psychiatric:  She is restrained and encephalopathic  Skin:  Multiple skin wounds noted on bilateral lower extremities    TELEMETRY:   Lab Results:  Results from last 7 days   Lab Units 09/30/22  0137   WBC Thousand/uL 14 78*   HEMOGLOBIN g/dL 9 8*   HEMATOCRIT % 31 0*   PLATELETS Thousands/uL 280     Results from last 7 days   Lab Units 09/30/22  0137   POTASSIUM mmol/L 3 8   CHLORIDE mmol/L 104   CO2 mmol/L 30   BUN mg/dL 40*   CREATININE mg/dL 6 37*   CALCIUM mg/dL 9 1   ALK PHOS U/L 85   ALT U/L 62   AST U/L 68*     Results from last 7 days   Lab Units 09/30/22  0137   POTASSIUM mmol/L 3 8   CHLORIDE mmol/L 104   CO2 mmol/L 30   BUN mg/dL 40*   CREATININE mg/dL 6 37*   CALCIUM mg/dL 9 1           Medications:    Current Facility-Administered Medications:   •  acetaminophen (TYLENOL) tablet 650 mg, 650 mg, Oral, Q6H PRN, Atul Adan DO, 650 mg at 09/22/22 1040  •  ceFAZolin (ANCEF) IVPB (premix in dextrose) 1,000 mg 50 mL, 1,000 mg, Intravenous, Q24H, Álvaro Knox PA-C, Last Rate: 100 mL/hr at 09/29/22 1739, 1,000 mg at 09/29/22 1739  •  gabapentin (NEURONTIN) oral solution 100 mg, 100 mg, Oral, TID, Vicente Tarango, DO, 100 mg at 09/30/22 2600  •  heparin (porcine) subcutaneous injection 5,000 Units, 5,000 Units, Subcutaneous, Q8H [facility], Atul Loco, DO, 5,000 Units at 09/30/22 0530  •  HYDROmorphone (DILAUDID) injection 0 5 mg, 0 5 mg, Intravenous, Q4H PRN, Harris Patrickis, DO, 0 5 mg at 09/27/22 1116  •  levalbuterol (XOPENEX) inhalation solution 0 63 mg, 0 63 mg, Nebulization, Q4H PRN, CARLITOS Scales, 0 63 mg at 09/29/22 2252  •  nicotine (NICODERM CQ) 14 mg/24hr TD 24 hr patch 1 patch, 1 patch, Transdermal, Daily, Atul Adan, DO, 1 patch at 09/28/22 1748  •  OLANZapine (ZyPREXA ZYDIS) dispersible tablet 5 mg, 5 mg, Oral, Q6H PRN, Vicente Tarango DO  •  ondansetron [facility]) injection 4 mg, 4 mg, Intravenous, Q4H PRN, Harris Reece, DO  •  QUEtiapine (SEROquel) tablet 25 mg, 25 mg, Oral, BID, Sam Pauma Gentleman, DO, 25 mg at 09/30/22 5082  •  thiamine (VITAMIN B1) 500 mg in sodium chloride 0 9 % 50 mL IVPB, 500 mg, Intravenous, TID, Vicente Tarango DO, Last Rate: 100 mL/hr at 09/30/22 0923, 500 mg at 09/30/22 1183  •  vancomycin (VANCOCIN) oral solution 125 mg, 125 mg, Oral, Q12H [facility], Vicente Tarango DO, 125 mg at 09/30/22 2466    This note was completed in part utilizing [employer] Flu[employer] Direct Software  Grammatical errors, random word insertions, spelling mistakes, and incomplete sentences may be an occasional consequence of this system secondary to software limitations, ambient noise, and hardware issues  If you have any questions or concerns about the content, text, or information contained within the body of this dictation, please contact the provider for clarification      Renetta Mitchell DO, [facility]  9/30/2022 1:43 PM

## 2022-09-30 NOTE — PROCEDURES
Procedures      Small bore feeding tube placed via right nares advanced to 35cm  CXR with tube past onur  Advanced to 55cm   KUB keofed in stomach advanced to 60cm after KUB and ok to use

## 2022-09-30 NOTE — ASSESSMENT & PLAN NOTE
· Likely source of rhabdomyolysis  No evidence of compartment syndrome fracture or DVT on imaging  · 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    · Resolved after IV diuretics  ·

## 2022-09-30 NOTE — ASSESSMENT & PLAN NOTE
· ANUJA/ATN secondary to rhabdomyolysis  No evidence of renal obstruction on imaging  · Appreciate nephrology evaluation  Awaiting renal recovery      · No emergent need for hemodialysis, consent obtained from family members  · Renal function improving    Results from last 7 days   Lab Units 09/30/22  0137 09/29/22  0513 09/28/22  0542 09/27/22  0557 09/26/22  0454 09/25/22  0551 09/24/22  0548   BUN mg/dL 40* 41* 48* 50* 53* 51* 52*   CREATININE mg/dL 6 37* 6 45* 6 94* 7 08* 7 63* 7 67* 7 55*   EGFR ml/min/1 73sq m 6 6 6 6 5 5 5

## 2022-09-30 NOTE — PROGRESS NOTES
[facility]  Progress Note - Daniele Braun 1970, 45 y o  female MRN: [id number]  Unit/Bed#: [facility] -01 Encounter: [id number]  Primary Care Provider: Ozzie Emanuel MD   Date and time admitted to hospital: 9/19/2022  8:04 AM    * Encephalopathy acute  Assessment & Plan  · Acute encephalopathy secondary to gabapentin and morphine with subsequent renal failure, potentially component of psychiatric illness, restarted back on Psych medications  · Likely a component of delirium as well given prolonged hospital stay  · Continue thiamine high-dose given potential concern for Wernicke's encephalopathy  · Holding morphine since admission due to over-sedation  · LP and MRI negative for acute pathology  · Found to be positive for methicillin sensitive Staph aureus- consideration for Toxic metabolic encephalopathy  · Restarted gabapentin - given concern for withdrawal  · Did give trial of hydromorphone given patient has previously been on high-dose pain medication  The patient herself endorses no pain  Start alternative nutrition hydration due to confusion, monitor for refeeding syndrome    Pulmonary edema  Assessment & Plan  Patient with flash pulmonary edema overnight in the setting of aggressive IV fluid as well as renal dysfunction  Given Bumex 2 mg x 1 with good urine output  Will re-dose this afternoon, with net negative goal 1-2 L  Repeat chest radiograph in a m   Echocardiogram with preserved ejection fraction  High risk situation    Bacteremia due to methicillin susceptible Staphylococcus aureus (MSSA)  Assessment & Plan  Patient with methicillin sensitive Staph aureus bacteremia  Echo with no vegetation    Will place CHERYLE referral per ID recommendations- unfortunately developed respiratory distress this morning and will need to be optimized prior to re-attempt  Potentially may have been present on admission as the patient has multiple wounds on her legs from previous fall 4 days ago  In differential diagnosis includes septic thrombophlebitis  Continue Ancef, started on prophylaxis Vancomycin to prevent C diff infection  Infectious Disease consultation reviewed  Localized swelling of right upper extremity  Assessment & Plan  Evidence of streaking of the right upper extremity, although difficult to determine given ecchymosis  Blood cultures positive for methicillin sensitive Staph aureus    Leg edema, right  Assessment & Plan  · Likely source of rhabdomyolysis  No evidence of compartment syndrome fracture or DVT on imaging  · 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  · Resolved after IV diuretics  ·       D-dimer, elevated  Assessment & Plan  · Elevated D-dimer may be related to fall  · Lower extremity duplex negative for DVT  · Renal dysfunction cannot check CTA of chest but doubt PE  · Discontinued heparin infusion and transitioned to prophylaxis dose    Traumatic rhabdomyolysis [facility])  Assessment & Plan  · Secondary to fall, patient had been laying in bed for approximately 4 days and family members had thought she was taking a deep nap  · She was subsequently brought to the hospital and found to be in acute rhabdomyolysis  · Had been on multiple sedating medications including MS Contin as well as gabapentin, which likely were contributing to sedation  · Was started on aggressive IV hydration, still with acute kidney injury  ·     Results from last 7 days   Lab Units 09/29/22  0513 09/27/22  0557 09/26/22  0454 09/25/22  0551 09/24/22  0548   CK TOTAL U/L 624* 848* 990* 793* 905*       Abnormal CT of the chest  Assessment & Plan  · Left-sided opacities noted  COVID negative  Transaminitis  Assessment & Plan  · Secondary to rhabdomyolysis  No evidence of liver injury on CT imaging  · Seen by GI  Hepatic duplex also negative      Results from last 7 days   Lab Units 09/30/22  0137 09/25/22  0551 09/24/22  0548   AST U/L 68* 82* 111*   ALT U/L 62 215* 240* TOTAL BILIRUBIN mg/dL 0 40 0 42 0 41       ARF (acute renal failure) (formerly [facility])  Assessment & Plan  · ANUJA/ATN secondary to rhabdomyolysis  No evidence of renal obstruction on imaging  · Appreciate nephrology evaluation  Awaiting renal recovery  · No emergent need for hemodialysis, consent obtained from family members  · Renal function improving    Results from last 7 days   Lab Units 09/30/22  0137 09/29/22  0513 09/28/22  0542 09/27/22  0557 09/26/22  0454 09/25/22  0551 09/24/22  0548   BUN mg/dL 40* 41* 48* 50* 53* 51* 52*   CREATININE mg/dL 6 37* 6 45* 6 94* 7 08* 7 63* 7 67* 7 55*   EGFR ml/min/1 73sq m 6 6 6 6 5 5 5       DDD (degenerative disc disease), lumbosacral  Assessment & Plan  · Lumbar radiculopathy due for surgery  · Prior to admission was on gabapentin and morphine IR 15 mg   · Confirmed on PDMP  · Gabapentin resumed at lower dose  Tobacco abuse  Assessment & Plan  · Nicotine patch ordered    Depression  Assessment & Plan  · Was on venlafaxine and quetiapine- has been refusing these medications since admission, part of this due to lethargy, part of it due to NPO status   ·   · Does have history of suicide attempt a currently does not show any signs of thoughts of self-harm  · Previously on Effexor, which patient has been refusing either spitting out medication or chewing  · Psychiatry consultation placed and patient case reviewed with on-call psychiatry  Placed on Seroquel 25 mg twice a day as mood stabilizer for current agitation  This may be further titrated to effect  · Discontinued Ativan for agitation and will place on Zyprexa 5 mg by mouth every 6 hours        [facility] Progress Note  Patient: Isra Perez 45 y o  female   MRN: [id number]  PCP: Vasu Berger MD  Unit/Bed#: [place] [address] [facility] 87 [id number] Encounter: [id number]  Date Of Visit: 09/30/22    Assessment:    Principal Problem:    Encephalopathy acute  Active Problems:    Depression    Tobacco abuse    DDD (degenerative disc disease), lumbosacral    ARF (acute renal failure) (HCC)    Transaminitis    Abnormal CT of the chest    Traumatic rhabdomyolysis (HCC)    D-dimer, elevated    Leg edema, right    Localized swelling of right upper extremity    Bacteremia due to methicillin susceptible Staphylococcus aureus (MSSA)    Pulmonary edema      Plan:    · Continue current medical managed  · NPO status due to confusion  · Bumex  · Overall condition remains tenous  · Family updated every single day this week although they report that they were only updated 1-2 times   Start trickle feed    VTE Pharmacologic Prophylaxis:   Pharmacologic: Heparin  Mechanical VTE Prophylaxis in Place: Yes    Patient Centered Rounds: I have performed bedside rounds with nursing staff today  Discussions with Specialists or Other Care Team Provider:  Discussed with case management    Education and Discussions with Family / Patient:  Patient  and daughter at bedside    Time Spent for Care: 45 minutes  More than 50% of total time spent on counseling and coordination of care as described above  Current Length of Stay: 11 day(s)    Current Patient Status: Inpatient   Certification Statement: The patient will continue to require additional inpatient hospital stay due to Confusion    Discharge Plan / Estimated Discharge Date:  Greater than 72 hours    Code Status: Level 1 - Full Code      Subjective:   Seen and examined, patient still intermittently confused  Overnight developed flash pulmonary edema  Responded well to intravenous Bumex  Had long discussion with family members about patient's current condition      Unfortunately they feel that patient has worsened  Reviewed current care plan    Due to patient lethargic as well as encephalopathy unable to obtain accurate review of systems although specifically asked patient if she was in pain and she states “no'    A complete and comprehensive 14 point organ system review has been performed and all other systems are negative other than stated above  Objective:     Vitals:   Temp (24hrs), Av °F (36 1 °C), Min:96 3 °F (35 7 °C), Max:97 8 °F (36 6 °C)    Temp:  [96 3 °F (35 7 °C)-97 8 °F (36 6 °C)] 96 3 °F (35 7 °C)  HR:  [] 116  Resp:  [18-20] 18  BP: ()/() 162/107  SpO2:  [88 %-99 %] 94 %  Body mass index is 23 84 kg/m²  Input and Output Summary (last 24 hours): Intake/Output Summary (Last 24 hours) at 2022 1528  Last data filed at 2022 2326  Gross per 24 hour   Intake 990 ml   Output --   Net 990 ml       Physical Exam:     General:  Appears chronically ill  HEENT: atraumatic, PERRLA, moist mucosa, normal pharynx, normal tonsils and adenoids, normal tongue, no fluid in sinuses  Neck: Trachea midline, no carotid bruit, no masses  Respiratory: normal chest wall expansion, CTA B, no r/r/w, no rubs  Cardiovascular: RRR, no m/r/g, Normal S1 and S2  Abdomen: Soft, non-tender, non-distended, normal bowel sounds in all quadrants, no hepatosplenomegaly, no tympany  Rectal: deferred  Musculoskeletal:  Moves all  Integumentary: warm, dry, and pink, with no rash, purpura, or petechia  Heme/Lymph: no lymphadenopathy, no bruises  Neurological: Cranial Nerves II-XII grossly intact  Psychiatric:  Confused and delirious    Additional Data:     Labs:    Results from last 7 days   Lab Units 22  0137   WBC Thousand/uL 14 78*   HEMOGLOBIN g/dL 9 8*   HEMATOCRIT % 31 0*   PLATELETS Thousands/uL 280   NEUTROS PCT % 90*   LYMPHS PCT % 5*   MONOS PCT % 4   EOS PCT % 0     Results from last 7 days   Lab Units 22  0137   POTASSIUM mmol/L 3 8   CHLORIDE mmol/L 104   CO2 mmol/L 30   BUN mg/dL 40*   CREATININE mg/dL 6 37*   CALCIUM mg/dL 9 1   ALK PHOS U/L 85   ALT U/L 62   AST U/L 68*           * I Have Reviewed All Lab Data Listed Above  * Additional Pertinent Lab Tests Reviewed:  [facility] Admission Reviewed    Imaging:    Imaging Reports Reviewed Today Include:  Chest radiograph  Imaging Personally Reviewed by Myself Includes:  Chest radiograph    Recent Cultures (last 7 days):     Results from last 7 days   Lab Units 09/28/22  1145 09/28/22  1144 09/26/22  1818 09/23/22  1608   BLOOD CULTURE  No Growth at 24 hrs  No Growth at 24 hrs  Staphylococcus aureus*  Staphylococcus aureus*  --    GRAM STAIN RESULT   --   --  Gram positive cocci in clusters*  Gram positive cocci in clusters* No Polys or Bacteria seen       Last 24 Hours Medication List:   Current Facility-Administered Medications   Medication Dose Route Frequency Provider Last Rate   • acetaminophen  650 mg Oral Q6H PRN Duey Riding, DO     • bumetanide  2 mg Intravenous Once Sam Mcdaniel DO     • cefazolin  1,000 mg Intravenous Q24H Álvaro Knox PA-C 1,000 mg (09/29/22 1299)   • gabapentin  100 mg Oral TID Karla Person DO     • heparin (porcine)  5,000 Units Subcutaneous [facility] Jones Adan DO     • HYDROmorphone  0 5 mg Intravenous Q4H PRN Duey Riding, DO     • levalbuterol  0 63 mg Nebulization Q4H PRN CARLITOS Scales     • nicotine  1 patch Transdermal Daily Atul Adan DO     • OLANZapine  5 mg Oral Q6H PRN Karla Person DO     • ondansetron  4 mg Intravenous Q4H PRN Duey Riding, DO     • QUEtiapine  25 mg Oral BID Karla Person DO     • thiamine  500 mg Intravenous TID Karla Person  mg (09/30/22 7998)   • vancomycin  125 mg Oral Q12H [facility] Sam Mcdaniel DO          Today, Patient Was Seen By: Karla Person    ** Please Note: This note was completed in part utilizing [employer] Software  Grammatical errors, random word insertions, spelling mistakes, and incomplete sentences may be an occasional consequence of this system secondary to software limitations, ambient noise, and hardware issues    If you have any questions or concerns about the content, text, or information contained within the body of this dictation, please contact the provider for clarification   **

## 2022-09-30 NOTE — PROGRESS NOTES
Progress Note - [facility] Infectious Disease   Papo Verma 45 y o  female MRN: [id number]  Unit/Bed#: [place] [address] -01 Encounter: [id number]      IMPRESSION & RECOMMENDATIONS:   1  SIRS vs Sepsis, not present on admission, a/e/b fever, tachycardia  Mild leukocytosis   Suspect this is likely due to Gram-positive bacteremia   T-max 100 9° F and now defervesced  No lactic acidosis   COVID-19 negative   UA benign   Procalcitonin mildly elevated 0 31   Status post lumbar puncture for altered mental status with negative ME panel    -antibiotics as below   -follow-up cultures and adjust antibiotics as needed  -monitor temperature and hemodynamics  -recheck CBC and BMP in a m      2  MSSA bacteremia  2 of 2 admission blood cultures postive for MSSA  Patient was found down for prolonged period of time with evidence of multiple wounds on admission  Suspect cutaneous vs endovascular source, likely phlebitis as there is report of RUE erythema surrounding prior IV in addition to palpable cord in left antecub  TTE negative for obvious vegetation  Repeat blood cultures x 2 sets are negative at 24h  CHERYLE planned today now on hold due to tenuous respiratory status    -continue IV cefazolin renally dosed 1 g Q 24  -continue to follow up repeat blood cultures to confirm clearance of bacteremia   -recommend CHERYLE and if negative anticipate treatment with IV abx for 2 weeks      3  Toxic Metabolic encephalopathy   Likely multifactorial etiology with uremia playing a role   This was initially thought to be secondary to gabapentin and morphine use in the setting of renal failure, hypotension, rhabdomyolysis   MRI brain negative   Lumbar puncture with opening pressure 26 with negative ME panel  CSF fluid culture not collected  Ongoing workup for autoimmune encephalitis       4  Acute renal failure   Creatinine on admission 5 88 with CK over 32,000   Creatinine reached plateau, now down [id number] 9 4 today   Likely multifactorial etiology in the setting of rhabdomyolysis and dehydration  Likely ANUJA/ATN  Estimated Creatinine Clearance: 9 5 mL/min (A) (by C-G formula based on SCr of 6 37 mg/dL (H))  -renally dose antibiotics as needed  -close Nephrology follow-up  -recheck BMP daily     5  Transaminitis   This is likely secondary to rhabdo   LFTs and liver enzymes down trending   No signs of liver damage on imaging   Ammonia level within normal limits   Acute hepatitis panel is negative      6  Right lower extremity swelling and pain   Venous duplex negative   MRI ordered  Paola Plummer following      7  Tobacco abuse   Encourage cessation as recommended by primary team      8  Antibiotic allergy  Hx of hives with PCNs  Tolerated ceftriaxone yesterday  Monitor for MUSA  Antibiotics:  Day 4 cefazolin     I have discussed the above management plan in detail with patient  I have discussed the above management plan in detail with patient's RN, and the primary service, SLIM  Subjective:  Awake and alert, says OK repeatedly  Now on NC  Denies SOB  Objective:  Vitals:  Temp:  [96 9 °F (36 1 °C)-97 8 °F (36 6 °C)] 96 9 °F (36 1 °C)  HR:  [] 123  Resp:  [19-20] 19  BP: ()/() 139/95  SpO2:  [89 %-99 %] 91 %  Temp (24hrs), Av 4 °F (36 3 °C), Min:96 9 °F (36 1 °C), Max:97 8 °F (36 6 °C)  Current: Temperature: (!) 96 9 °F (36 1 °C)    PHYSICAL EXAM:  General Appearance:  Appearing alert and interactive, involuntary movements    HEENT: Normocephalic, without obvious abnormality, atraumatic  Conjunctiva pink and sclera anicteric  Oropharynx moist without lesions  Lungs:   Clear to auscultation bilaterally, respirations unlabored   Heart:  RRR; no murmur, rub or gallop   Abdomen:   Soft, non-tender, non-distended, positive bowel sounds    Extremities: No cyanosis, clubbing or edema  LLE swelling resolved  Musculoskeletal: Back symmetric without curvature, ROM normal     : No CVA or suprapubic tenderness  No abel      Neuro: Encephalopathic  Skin: No rashes or lesions  No draining wounds noted  B/l antecubs with palpable cord at prior IV sites  No pain with palpation  Scattered wound noted at various stages of healing  B/l UE ecchymoses noted  Peripheral IV intact without evidence of erythema, warmth, or exudate  LABS, IMAGING, & OTHER STUDIES:  Lab Results:  I have personally reviewed pertinent labs  Results from last 7 days   Lab Units 09/30/22  0137 09/29/22  0513 09/28/22  0542   WBC Thousand/uL 14 78* 9 27 8 58   HEMOGLOBIN g/dL 9 8* 9 7* 11 5   PLATELETS Thousands/uL 280 282 293     Results from last 7 days   Lab Units 09/30/22  0137 09/29/22  0513 09/28/22  0542 09/26/22  0454 09/25/22  0551 09/24/22  0548   SODIUM mmol/L 145 145 144   < > 143 140   POTASSIUM mmol/L 3 8 3 2* 3 3*   < > 4 3 4 6   CHLORIDE mmol/L 104 103 103   < > 107 108   CO2 mmol/L 30 30 29   < > 20* 18*   BUN mg/dL 40* 41* 48*   < > 51* 52*   CREATININE mg/dL 6 37* 6 45* 6 94*   < > 7 67* 7 55*   EGFR ml/min/1 73sq m 6 6 6   < > 5 5   CALCIUM mg/dL 9 1 8 9 8 9   < > 8 4 8 2*   AST U/L 68*  --   --   --  82* 111*   ALT U/L 62  --   --   --  215* 240*   ALK PHOS U/L 85  --   --   --  67 67    < > = values in this interval not displayed  Results from last 7 days   Lab Units 09/28/22  1145 09/28/22  1144 09/26/22  1818 09/23/22  1608   BLOOD CULTURE  No Growth at 24 hrs  No Growth at 24 hrs   Staphylococcus aureus*  Staphylococcus aureus*  --    GRAM STAIN RESULT   --   --  Gram positive cocci in clusters*  Gram positive cocci in clusters* No Polys or Bacteria seen

## 2022-10-01 ENCOUNTER — APPOINTMENT (OUTPATIENT)
Dept: RADIOLOGY | Facility: HOSPITAL | Age: 52
End: 2022-10-01

## 2022-10-01 ENCOUNTER — APPOINTMENT (INPATIENT)
Dept: CT IMAGING | Facility: HOSPITAL | Age: 52
End: 2022-10-01

## 2022-10-01 PROBLEM — J69.0 ASPIRATION PNEUMONITIS (HCC): Status: ACTIVE | Noted: 2022-10-01

## 2022-10-01 LAB
ALBUMIN SERPL BCP-MCNC: 2.3 G/DL (ref 3.5–5)
ALBUMIN SERPL BCP-MCNC: 2.6 G/DL (ref 3.5–5)
ALP SERPL-CCNC: 76 U/L (ref 46–116)
ALP SERPL-CCNC: 91 U/L (ref 46–116)
ALT SERPL W P-5'-P-CCNC: 45 U/L (ref 12–78)
ALT SERPL W P-5'-P-CCNC: 47 U/L (ref 12–78)
ANION GAP SERPL CALCULATED.3IONS-SCNC: 8 MMOL/L (ref 4–13)
ANION GAP SERPL CALCULATED.3IONS-SCNC: 9 MMOL/L (ref 4–13)
AST SERPL W P-5'-P-CCNC: 56 U/L (ref 5–45)
AST SERPL W P-5'-P-CCNC: 66 U/L (ref 5–45)
BASE EX.OXY STD BLDV CALC-SCNC: 85.2 % (ref 60–80)
BASE EXCESS BLDV CALC-SCNC: 3.1 MMOL/L
BASOPHILS # BLD AUTO: 0.05 THOUSANDS/ÂΜL (ref 0–0.1)
BASOPHILS # BLD AUTO: 0.08 THOUSANDS/ÂΜL (ref 0–0.1)
BASOPHILS NFR BLD AUTO: 0 % (ref 0–1)
BASOPHILS NFR BLD AUTO: 0 % (ref 0–1)
BILIRUB SERPL-MCNC: 0.36 MG/DL (ref 0.2–1)
BILIRUB SERPL-MCNC: 0.44 MG/DL (ref 0.2–1)
BUN SERPL-MCNC: 39 MG/DL (ref 5–25)
BUN SERPL-MCNC: 41 MG/DL (ref 5–25)
CALCIUM ALBUM COR SERPL-MCNC: 10.4 MG/DL (ref 8.3–10.1)
CALCIUM ALBUM COR SERPL-MCNC: 10.4 MG/DL (ref 8.3–10.1)
CALCIUM SERPL-MCNC: 9 MG/DL (ref 8.3–10.1)
CALCIUM SERPL-MCNC: 9.3 MG/DL (ref 8.3–10.1)
CHLORIDE SERPL-SCNC: 102 MMOL/L (ref 96–108)
CHLORIDE SERPL-SCNC: 104 MMOL/L (ref 96–108)
CO2 SERPL-SCNC: 33 MMOL/L (ref 21–32)
CO2 SERPL-SCNC: 34 MMOL/L (ref 21–32)
CREAT SERPL-MCNC: 5.78 MG/DL (ref 0.6–1.3)
CREAT SERPL-MCNC: 5.88 MG/DL (ref 0.6–1.3)
EOSINOPHIL # BLD AUTO: 0.02 THOUSAND/ÂΜL (ref 0–0.61)
EOSINOPHIL # BLD AUTO: 0.04 THOUSAND/ÂΜL (ref 0–0.61)
EOSINOPHIL NFR BLD AUTO: 0 % (ref 0–6)
EOSINOPHIL NFR BLD AUTO: 0 % (ref 0–6)
ERYTHROCYTE [DISTWIDTH] IN BLOOD BY AUTOMATED COUNT: 14 % (ref 11.6–15.1)
ERYTHROCYTE [DISTWIDTH] IN BLOOD BY AUTOMATED COUNT: 14.1 % (ref 11.6–15.1)
GFR SERPL CREATININE-BSD FRML MDRD: 7 ML/MIN/1.73SQ M
GFR SERPL CREATININE-BSD FRML MDRD: 7 ML/MIN/1.73SQ M
GLUCOSE SERPL-MCNC: 133 MG/DL (ref 65–140)
GLUCOSE SERPL-MCNC: 156 MG/DL (ref 65–140)
HCO3 BLDV-SCNC: 28.6 MMOL/L (ref 24–30)
HCT VFR BLD AUTO: 31.2 % (ref 34.8–46.1)
HCT VFR BLD AUTO: 34.4 % (ref 34.8–46.1)
HGB BLD-MCNC: 11 G/DL (ref 11.5–15.4)
HGB BLD-MCNC: 9.7 G/DL (ref 11.5–15.4)
IMM GRANULOCYTES # BLD AUTO: 0.06 THOUSAND/UL (ref 0–0.2)
IMM GRANULOCYTES # BLD AUTO: 0.12 THOUSAND/UL (ref 0–0.2)
IMM GRANULOCYTES NFR BLD AUTO: 0 % (ref 0–2)
IMM GRANULOCYTES NFR BLD AUTO: 1 % (ref 0–2)
LACTATE SERPL-SCNC: 1.3 MMOL/L (ref 0.5–2)
LYMPHOCYTES # BLD AUTO: 1.37 THOUSANDS/ÂΜL (ref 0.6–4.47)
LYMPHOCYTES # BLD AUTO: 1.68 THOUSANDS/ÂΜL (ref 0.6–4.47)
LYMPHOCYTES NFR BLD AUTO: 7 % (ref 14–44)
LYMPHOCYTES NFR BLD AUTO: 9 % (ref 14–44)
MAGNESIUM SERPL-MCNC: 1.7 MG/DL (ref 1.6–2.6)
MAGNESIUM SERPL-MCNC: 2.1 MG/DL (ref 1.6–2.6)
MCH RBC QN AUTO: 32.1 PG (ref 26.8–34.3)
MCH RBC QN AUTO: 32.9 PG (ref 26.8–34.3)
MCHC RBC AUTO-ENTMCNC: 31.1 G/DL (ref 31.4–37.4)
MCHC RBC AUTO-ENTMCNC: 32 G/DL (ref 31.4–37.4)
MCV RBC AUTO: 103 FL (ref 82–98)
MCV RBC AUTO: 103 FL (ref 82–98)
MONOCYTES # BLD AUTO: 0.87 THOUSAND/ÂΜL (ref 0.17–1.22)
MONOCYTES # BLD AUTO: 0.92 THOUSAND/ÂΜL (ref 0.17–1.22)
MONOCYTES NFR BLD AUTO: 5 % (ref 4–12)
MONOCYTES NFR BLD AUTO: 5 % (ref 4–12)
NEUTROPHILS # BLD AUTO: 15.75 THOUSANDS/ÂΜL (ref 1.85–7.62)
NEUTROPHILS # BLD AUTO: 17.02 THOUSANDS/ÂΜL (ref 1.85–7.62)
NEUTS SEG NFR BLD AUTO: 85 % (ref 43–75)
NEUTS SEG NFR BLD AUTO: 88 % (ref 43–75)
NRBC BLD AUTO-RTO: 0 /100 WBCS
NRBC BLD AUTO-RTO: 0 /100 WBCS
O2 CT BLDV-SCNC: 14.3 ML/DL
PCO2 BLDV: 47.4 MM HG (ref 42–50)
PH BLDV: 7.4 [PH] (ref 7.3–7.4)
PHOSPHATE SERPL-MCNC: 5.3 MG/DL (ref 2.7–4.5)
PHOSPHATE SERPL-MCNC: 5.4 MG/DL (ref 2.7–4.5)
PLATELET # BLD AUTO: 325 THOUSANDS/UL (ref 149–390)
PLATELET # BLD AUTO: 336 THOUSANDS/UL (ref 149–390)
PMV BLD AUTO: 10.3 FL (ref 8.9–12.7)
PMV BLD AUTO: 11.1 FL (ref 8.9–12.7)
PO2 BLDV: 50.3 MM HG (ref 35–45)
POTASSIUM SERPL-SCNC: 3.1 MMOL/L (ref 3.5–5.3)
POTASSIUM SERPL-SCNC: 3.2 MMOL/L (ref 3.5–5.3)
PROT SERPL-MCNC: 5.6 G/DL (ref 6.4–8.4)
PROT SERPL-MCNC: 6.2 G/DL (ref 6.4–8.4)
RBC # BLD AUTO: 3.02 MILLION/UL (ref 3.81–5.12)
RBC # BLD AUTO: 3.34 MILLION/UL (ref 3.81–5.12)
SARS-COV-2 RNA RESP QL NAA+PROBE: NEGATIVE
SODIUM SERPL-SCNC: 144 MMOL/L (ref 135–147)
SODIUM SERPL-SCNC: 146 MMOL/L (ref 135–147)
WBC # BLD AUTO: 18.54 THOUSAND/UL (ref 4.31–10.16)
WBC # BLD AUTO: 19.44 THOUSAND/UL (ref 4.31–10.16)

## 2022-10-01 RX ORDER — LORAZEPAM 2 MG/ML
0.5 INJECTION INTRAMUSCULAR ONCE
Status: COMPLETED | OUTPATIENT
Start: 2022-10-01 | End: 2022-10-01

## 2022-10-01 RX ORDER — BUMETANIDE 0.25 MG/ML
2 INJECTION, SOLUTION INTRAMUSCULAR; INTRAVENOUS 2 TIMES DAILY
Status: DISCONTINUED | OUTPATIENT
Start: 2022-10-01 | End: 2022-10-02

## 2022-10-01 RX ORDER — METOPROLOL TARTRATE 5 MG/5ML
2.5 INJECTION INTRAVENOUS EVERY 8 HOURS
Status: DISCONTINUED | OUTPATIENT
Start: 2022-10-01 | End: 2022-10-02

## 2022-10-01 RX ORDER — NIFEDIPINE 30 MG/1
30 TABLET, EXTENDED RELEASE ORAL DAILY
Status: DISCONTINUED | OUTPATIENT
Start: 2022-10-01 | End: 2022-10-01

## 2022-10-01 RX ORDER — MAGNESIUM SULFATE 1 G/100ML
1 INJECTION INTRAVENOUS ONCE
Status: COMPLETED | OUTPATIENT
Start: 2022-10-01 | End: 2022-10-01

## 2022-10-01 RX ORDER — POTASSIUM CHLORIDE 14.9 MG/ML
20 INJECTION INTRAVENOUS 2 TIMES DAILY
Status: COMPLETED | OUTPATIENT
Start: 2022-10-01 | End: 2022-10-02

## 2022-10-01 RX ORDER — AMLODIPINE BESYLATE 10 MG/1
10 TABLET ORAL DAILY
Status: DISCONTINUED | OUTPATIENT
Start: 2022-10-01 | End: 2022-11-07 | Stop reason: HOSPADM

## 2022-10-01 RX ORDER — HYDRALAZINE HYDROCHLORIDE 20 MG/ML
5 INJECTION INTRAMUSCULAR; INTRAVENOUS EVERY 6 HOURS PRN
Status: DISCONTINUED | OUTPATIENT
Start: 2022-10-01 | End: 2022-10-10

## 2022-10-01 RX ORDER — LEVALBUTEROL 1.25 MG/.5ML
1.25 SOLUTION, CONCENTRATE RESPIRATORY (INHALATION) EVERY 4 HOURS PRN
Status: DISCONTINUED | OUTPATIENT
Start: 2022-10-01 | End: 2022-11-07 | Stop reason: HOSPADM

## 2022-10-01 RX ORDER — LEVALBUTEROL 1.25 MG/.5ML
1.25 SOLUTION, CONCENTRATE RESPIRATORY (INHALATION) ONCE
Status: DISCONTINUED | OUTPATIENT
Start: 2022-10-01 | End: 2022-10-03

## 2022-10-01 RX ORDER — BUMETANIDE 0.25 MG/ML
2 INJECTION, SOLUTION INTRAMUSCULAR; INTRAVENOUS ONCE
Status: COMPLETED | OUTPATIENT
Start: 2022-10-01 | End: 2022-10-01

## 2022-10-01 RX ORDER — LABETALOL HYDROCHLORIDE 5 MG/ML
10 INJECTION, SOLUTION INTRAVENOUS ONCE
Status: COMPLETED | OUTPATIENT
Start: 2022-10-01 | End: 2022-10-01

## 2022-10-01 RX ADMIN — LORAZEPAM 0.5 MG: 2 INJECTION INTRAMUSCULAR; INTRAVENOUS at 02:12

## 2022-10-01 RX ADMIN — BUMETANIDE 2 MG: 0.25 INJECTION INTRAMUSCULAR; INTRAVENOUS at 17:24

## 2022-10-01 RX ADMIN — GABAPENTIN 100 MG: 250 SUSPENSION ORAL at 17:19

## 2022-10-01 RX ADMIN — HEPARIN SODIUM 5000 UNITS: 5000 INJECTION INTRAVENOUS; SUBCUTANEOUS at 23:40

## 2022-10-01 RX ADMIN — GABAPENTIN 100 MG: 250 SUSPENSION ORAL at 08:10

## 2022-10-01 RX ADMIN — HYDROMORPHONE HYDROCHLORIDE 0.5 MG: 1 INJECTION, SOLUTION INTRAMUSCULAR; INTRAVENOUS; SUBCUTANEOUS at 00:31

## 2022-10-01 RX ADMIN — HEPARIN SODIUM 5000 UNITS: 5000 INJECTION INTRAVENOUS; SUBCUTANEOUS at 15:00

## 2022-10-01 RX ADMIN — POTASSIUM CHLORIDE 20 MEQ: 14.9 INJECTION, SOLUTION INTRAVENOUS at 09:32

## 2022-10-01 RX ADMIN — POTASSIUM CHLORIDE 10 MEQ: 2 INJECTION, SOLUTION, CONCENTRATE INTRAVENOUS at 04:15

## 2022-10-01 RX ADMIN — HEPARIN SODIUM 5000 UNITS: 5000 INJECTION INTRAVENOUS; SUBCUTANEOUS at 06:22

## 2022-10-01 RX ADMIN — QUETIAPINE FUMARATE 50 MG: 25 TABLET ORAL at 17:19

## 2022-10-01 RX ADMIN — BUMETANIDE 2 MG: 0.25 INJECTION INTRAMUSCULAR; INTRAVENOUS at 07:34

## 2022-10-01 RX ADMIN — QUETIAPINE FUMARATE 50 MG: 25 TABLET ORAL at 08:11

## 2022-10-01 RX ADMIN — LABETALOL HYDROCHLORIDE 10 MG: 5 INJECTION, SOLUTION INTRAVENOUS at 08:11

## 2022-10-01 RX ADMIN — AMLODIPINE BESYLATE 10 MG: 10 TABLET ORAL at 17:19

## 2022-10-01 RX ADMIN — THIAMINE HYDROCHLORIDE 500 MG: 100 INJECTION, SOLUTION INTRAMUSCULAR; INTRAVENOUS at 08:11

## 2022-10-01 RX ADMIN — CEFAZOLIN SODIUM 1000 MG: 1 SOLUTION INTRAVENOUS at 23:41

## 2022-10-01 RX ADMIN — BUMETANIDE 2 MG: 0.25 INJECTION INTRAMUSCULAR; INTRAVENOUS at 11:42

## 2022-10-01 RX ADMIN — HYDRALAZINE HYDROCHLORIDE 5 MG: 20 INJECTION INTRAMUSCULAR; INTRAVENOUS at 12:36

## 2022-10-01 RX ADMIN — MAGNESIUM SULFATE HEPTAHYDRATE 1 G: 1 INJECTION, SOLUTION INTRAVENOUS at 02:54

## 2022-10-01 RX ADMIN — GABAPENTIN 100 MG: 250 SUSPENSION ORAL at 23:41

## 2022-10-01 NOTE — ASSESSMENT & PLAN NOTE
· Acute encephalopathy secondary to gabapentin and morphine with subsequent renal failure, potentially component of psychiatric illness, restarted back on Psych medications  · Increased Seroquel 50 mg every 12 hours  · Likely a component of delirium as well given prolonged hospital stay  · Continue thiamine high-dose given potential concern for Wernicke's encephalopathy  · Holding morphine since admission due to over-sedation  · LP and MRI negative for acute pathology  · Found to be positive for methicillin sensitive Staph aureus- consideration for Toxic metabolic encephalopathy  · Restarted gabapentin - given concern for withdrawal  · Did give trial of hydromorphone given patient has previously been on high-dose pain medication    The patient herself endorses no pain

## 2022-10-01 NOTE — ASSESSMENT & PLAN NOTE
· Secondary to rhabdomyolysis  No evidence of liver injury on CT imaging  · Seen by GI  Hepatic duplex also negative      Results from last 7 days   Lab Units 10/01/22  0507 10/01/22  0145 09/30/22  0137 09/25/22  0551   AST U/L 56* 66* 68* 82*   ALT U/L 47 45 62 215*   TOTAL BILIRUBIN mg/dL 0 36 0 44 0 40 0 42

## 2022-10-01 NOTE — ASSESSMENT & PLAN NOTE
Patient with significant wheezing as well as new right lower lobe opacity seen on imaging study  Likely aspiration pneumonia/pneumonitis in the setting of encephalopathy  Patient remains NPO  Continue alternative nutrition and hydration until patient is more alert awake

## 2022-10-01 NOTE — PROCEDURES
Small bore feeding tube placed via right nares advanced to 35cm  CXR with tube past onur  KUB keofed in stomach advanced to 60cm after KUB and ok to use  Stylette removed post insertion

## 2022-10-01 NOTE — PROGRESS NOTES
Progress Note - Nephrology   Jayde Montano 45 y o  female MRN: [id number]  Unit/Bed#: [facility] 68 2 -01 Encounter: [id number]    1year-old female with history of depression, GERD, tobacco abuse, who presents with change in mental status  Per outpatient record, patient suffered a fall on Friday   She became increasingly lethargic and sedentary over the weekend with decreased oral intake and worsening right leg pain   Nephrology consulted for acute kidney injury      ASSESSMENT and PLAN:  Acute kidney injury (POA):  Etiology: Suspect ATN in the setting of severe rhabdomyolysis, prerenal component and now with MSSA bacteremia  Assessment:  • After review of medical records through [address] [place] [address] it appears that the patient has a baseline Creatinine of 0 7-1 0  • Admission creatinine 5 8,  • Peak creatinine 7 6  • Current creatinine 5 78  • Has not required hemodialysis; if hemodialysis is required consent form is on chart  • Unable to obtain accurate I&O for patient is incontinent; has pulled out Rivera catheters unable to use purwick  • Now on V Bumex for increased volume per Cardiology  Plan:  • Avoid nephrotoxins, adjust meds to appropriate GFR  • Optimize hemodynamic status to avoid delay in renal recovery  • Continue to monitor closely for dialysis need  • Off IV fluids  • Continue with tube feedings with Jevity and free water flushes at 100 mL every 6 hours  • Check BMP in a m   • Monitor creatinine with IV diuretics    Severe rhabdomyolysis:  Status post fall  Assessment and plan  • Peak CK level 32,630  • Has improved and currently 624  • Continue to trend    Hypokalemia  Assessment and Plan:  • Intermittent potassium replacement  • Potassium 3 2 this a m  likely secondary to IV diuretic use  • IV supplementation given today  • Check BMP in a m  treat as needed  • Home medications include:    Right lower extremity edema  Assessment and Plan:  • Slowly improving  • Status post duplex ultrasound-did not reveal DVT  • Orthopedic consulted and no need for MRI    Hypoxic respiratory failure  Assessment and Plan:  • Chest x-ray concern for pulmonary edema versus pneumonia  • Remains on 6 L nasal cannula  • Now on IV Bumex 2 times daily-for cardiology  • Had evaluation overnight for high DI score post Dilaudid administration and found to have persistent pulmonary infiltrates on chest x-ray  • Remains on oxygen  • Will continue to trend     Encephalopathy  Assessment and Plan:  Etiology:  Likely multifactorial in the setting of component of delirium/prolonged hospital stay/infection MSSA/medications such as gabapentin/morphine/ANUJA and component of psychiatric history  · Management per primary team  · Avoid gabapentin and morphine  · Less likely uremic encephalopathy with slowly improving azotemia    MSSA bacteremia:  Likely secondary to thrombo phlebitis  Assessment and plan  · Infectious disease following  · Remains on IV antibiotics  · Status post LP and MRI      Review of systems  Patient seen and examined at bedside:  Review of Systems   Reason unable to perform ROS: Patient remains encephalopathy  Physical exam:  Current Weight: Weight - Scale: 63 5 kg (139 lb 15 9 oz)  Vitals:    10/01/22 0742 10/01/22 0803 10/01/22 1057 10/01/22 1122   BP:  (!) 173/118 (!) 162/112 (!) 166/102   BP [place]    Left arm   Pulse: (!) 124 (!) 125 (!) 115    Resp:       Temp:       TempSrc:       SpO2: 92% 91% 97%    Weight:       Height:           Intake/Output Summary (Last 24 hours) at 10/1/2022 1336  Last data filed at 10/1/2022 1120  Gross per 24 hour   Intake 452 ml   Output --   Net 452 ml       Physical Exam  Vitals and nursing note reviewed  Constitutional:       Appearance: She is ill-appearing  Comments: No acute distress, not responding to verbal questions   HENT:      Head: Normocephalic and atraumatic  Nose: Nose normal       Mouth/Throat:      Mouth: Mucous membranes are dry  Pharynx: Oropharynx is clear  Eyes:      Extraocular Movements: Extraocular movements intact  Conjunctiva/sclera: Conjunctivae normal    Cardiovascular:      Rate and Rhythm: Regular rhythm  Tachycardia present  Pulses: Normal pulses  Heart sounds: Normal heart sounds  Pulmonary:      Effort: Pulmonary effort is normal       Breath sounds: Wheezing present  Comments: Diminished throughout  Abdominal:      General: Bowel sounds are normal       Palpations: Abdomen is soft  Musculoskeletal:         General: Normal range of motion  Cervical back: Normal range of motion and neck supple  Skin:     General: Skin is warm and dry     Psychiatric:      Comments: Unable to assess            Medications:    Current Facility-Administered Medications:   •  acetaminophen (TYLENOL) tablet 650 mg, 650 mg, Oral, Q6H PRN, Atul Adan, DO, 650 mg at 09/22/22 1040  •  ALPRAZolam (XANAX) tablet 0 5 mg, 0 5 mg, Per NG Tube, HS PRN, CARLITOS Kwan  •  bumetanide (BUMEX) injection 2 mg, 2 mg, Intravenous, BID, Sam Payne DO, 2 mg at 10/01/22 1142  •  ceFAZolin (ANCEF) IVPB (premix in dextrose) 1,000 mg 50 mL, 1,000 mg, Intravenous, Q24H, Álvaro Knox PA-C, Last Rate: 100 mL/hr at 09/30/22 2005, 1,000 mg at 09/30/22 2005  •  gabapentin (NEURONTIN) oral solution 100 mg, 100 mg, Per NG Tube, TID, Muriel Branch, DO, 100 mg at 10/01/22 0810  •  heparin (porcine) subcutaneous injection 5,000 Units, 5,000 Units, Subcutaneous, Q8H [facility], Atul Adan DO, 5,000 Units at 10/01/22 8691  •  hydrALAZINE (APRESOLINE) injection 5 mg, 5 mg, Intravenous, Q6H PRN, Muriel Branch DO, 5 mg at 10/01/22 1236  •  HYDROmorphone (DILAUDID) injection 0 5 mg, 0 5 mg, Intravenous, Q4H PRN, Elizabeth Goode DO, 0 5 mg at 10/01/22 0031  •  levalbuterol (XOPENEX) inhalation solution 1 25 mg, 1 25 mg, Nebulization, Q4H PRN, Muriel Branch DO  •  levalbuterol [facility]) inhalation solution 1 25 mg, 1 25 mg, Nebulization, Once, Sam Jose Monday, DO  •  nicotine (NICODERM CQ) 14 mg/24hr TD 24 hr patch 1 patch, 1 patch, Transdermal, Daily, Atul Adan DO, 1 patch at 09/28/22 8754  •  OLANZapine (ZyPREXA ZYDIS) dispersible tablet 5 mg, 5 mg, Oral, Q6H PRN, Yaquelin Barboza DO, 5 mg at 09/30/22 2218  •  ondansetron [facility]) injection 4 mg, 4 mg, Intravenous, Q4H PRN, Dustin Dayron, DO  •  potassium chloride 20 mEq IVPB (premix), 20 mEq, Intravenous, BID, Duane Filippo, CRNP, Last Rate: 50 mL/hr at 10/01/22 0932, 20 mEq at 10/01/22 0932  •  QUEtiapine (SEROquel) tablet 50 mg, 50 mg, Per NG Tube, BID, Yaquelin Barboza DO, 50 mg at 10/01/22 0632    Laboratory Results:  Results from last 7 days   Lab Units 10/01/22  0507 10/01/22  0145 09/30/22  0137 09/29/22  0513 09/28/22  0542 09/27/22  0557 09/26/22  0454 09/26/22  0452   WBC Thousand/uL 18 54* 19 44* 14 78* 9 27 8 58 11 66*  --  11 15*   HEMOGLOBIN g/dL 9 7* 11 0* 9 8* 9 7* 11 5 12 2  --  12 5   HEMATOCRIT % 31 2* 34 4* 31 0* 30 4* 36 2 37 6  --  39 0   PLATELETS Thousands/uL 325 336 280 282 293 308  --  310   SODIUM mmol/L 144 146 145 145 144 140 142  --    POTASSIUM mmol/L 3 2* 3 1* 3 8 3 2* 3 3* 4 0 3 6  --    CHLORIDE mmol/L 102 104 104 103 103 100 104  --    CO2 mmol/L 33* 34* 30 30 29 24 22  --    BUN mg/dL 39* 41* 40* 41* 48* 50* 53*  --    CREATININE mg/dL 5 78* 5 88* 6 37* 6 45* 6 94* 7 08* 7 63*  --    CALCIUM mg/dL 9 0 9 3 9 1 8 9 8 9 8 8 9 1  --    MAGNESIUM mg/dL 2 1 1 7  --  1 8  --   --   --   --    PHOSPHORUS mg/dL 5 3* 5 4*  --   --   --   --   --   --

## 2022-10-01 NOTE — ASSESSMENT & PLAN NOTE
· Was on venlafaxine and quetiapine- has been refusing these medications since admission, part of this due to lethargy, part of it due to NPO status   ·   · Does have history of suicide attempt a currently does not show any signs of thoughts of self-harm  · Previously on Effexor, which patient has been refusing either spitting out medication or chewing  · Psychiatry consultation placed and patient case reviewed with on-call psychiatry  · Please give Zyprexa 5 mg by mouth every 6 hours

## 2022-10-01 NOTE — PROGRESS NOTES
Progress Note - Cardiology   Jordan Case 45 y o  female MRN: [id number]  Unit/Bed#: [place] [address] -01 Encounter: [id number]      Assessment/Recommendations/Discussion:   1  MSSA bacteremia 2/2 blood cultures positive  2  Acute respiratory failure, acute diastolic heart failure due to volume overload  3  Toxic metabolic encephalopathy  4  Acute renal failure presumed due to ATN and severe rhabdomyolysis  5  Rhabdomyolysis  6  Depression      PLAN  • Still with significant wheezing, shortness of breath, increased oxygen requirement  CT of the chest shows multifocal pulmonary opacities with new consolidative opacity in the right lower lobe suspicious for combination of multi focal pneumonia and pulmonary edema  She has new small moderate bilateral pleural effusions right worse than left  • Echo in 9/27 showed EF 63% with normal RV size and function trace MR, trace TR, trace PI  Given the new development of the pleural effusions as well as respiratory failure, would plan to check repeat follow-up limited echo to assess for any interval change in LV or RV systolic function  • Renal function continues to improve  • Continue with IV Bumex 2 mg  • Continue to follow renal function, oxygen requirement and weight  • Eventually will need CHERYLE once clinically improved from a respiratory standpoint        Subjective:   HPI  She gives none of her own history, still on increased oxygen requirement, audible wheezing is noted      Review of Systems: As noted in HPI  Rest of ROS is negative      Vitals:   BP (!) 166/102 (BP [place] Left arm)   Pulse (!) 115   Temp (!) 96 4 °F (35 8 °C)   Resp (!) 24   Ht 5' 6" (1 676 m)   Wt 63 5 kg (139 lb 15 9 oz)   LMP 03/14/2019 (Approximate)   SpO2 97%   BMI 22 60 kg/m²   I/O       09/29 0701  09/30 0700 09/30 0701  10/01 0700 10/01 0701  10/02 0700    I V  (mL/kg) 990 (14 8)      NG/GT   352    IV Piggyback  100     Total Intake(mL/kg) 990 (14 8) 100 (1 6) 352 (5 5)    Net +990 +100 +352           Unmeasured Urine Occurrence 1 x 4 x     Unmeasured Stool Occurrence 1 x 1 x         Weight (last 2 days)     Date/Time Weight    10/01/22 0600 63 5 (139 99)    09/30/22 0600 67 (147 71)    09/29/22 0600 67 8 (149 47)          Physical Exam   Constitutional: awake, but not interactive to any significant degree, cephalad path echo  Head: Normocephalic, without obvious abnormality, atraumatic  Eyes: conjunctivae clear and moist  Sclera anicteric  No xanthelasmas  Pupils equal bilaterally  Extraocular motions are full  Ear nose mouth and throat: ears are symmetrical bilaterally, hearing appears to be equal bilaterally, no nasal discharge or epistaxis, oropharynx is clear with moist mucous membranes  Neck:  Trachea is midline, neck is supple, no thyromegaly or significant lymphadenopathy, there is full range of motion  Lungs:  Diffuse wheezing with decreased breath sounds at bases bilaterally  Heart: regular rate and rhythm, S1, S2 normal, no murmur, no click, no rub and no gallop, no lower extremity edema  Abdomen: soft, non-tender; bowel sounds normal; no masses,  no organomegaly  Psychiatric:  Encephalopathic  Skin: Skin is warm, dry, intact  No obvious rashes or lesions on exposed extremities  Nail beds are pink with no cyanosis or clubbing      TELEMETRY:   Lab Results:  Results from last 7 days   Lab Units 10/01/22  0507   WBC Thousand/uL 18 54*   HEMOGLOBIN g/dL 9 7*   HEMATOCRIT % 31 2*   PLATELETS Thousands/uL 325     Results from last 7 days   Lab Units 10/01/22  0507   POTASSIUM mmol/L 3 2*   CHLORIDE mmol/L 102   CO2 mmol/L 33*   BUN mg/dL 39*   CREATININE mg/dL 5 78*   CALCIUM mg/dL 9 0   ALK PHOS U/L 76   ALT U/L 47   AST U/L 56*     Results from last 7 days   Lab Units 10/01/22  0507   POTASSIUM mmol/L 3 2*   CHLORIDE mmol/L 102   CO2 mmol/L 33*   BUN mg/dL 39*   CREATININE mg/dL 5 78*   CALCIUM mg/dL 9 0           Medications:    Current Facility-Administered Medications:   • acetaminophen (TYLENOL) tablet 650 mg, 650 mg, Oral, Q6H PRN, Atul Adan, DO, 650 mg at 09/22/22 1040  •  ALPRAZolam (XANAX) tablet 0 5 mg, 0 5 mg, Per NG Tube, HS PRN, CARLITOS Cifuentes  •  bumetanide (BUMEX) injection 2 mg, 2 mg, Intravenous, BID, Sam Ratliff, DO, 2 mg at 10/01/22 1142  •  ceFAZolin (ANCEF) IVPB (premix in dextrose) 1,000 mg 50 mL, 1,000 mg, Intravenous, Q24H, Álvaro Knox PA-C, Last Rate: 100 mL/hr at 09/30/22 2005, 1,000 mg at 09/30/22 2005  •  gabapentin (NEURONTIN) oral solution 100 mg, 100 mg, Per NG Tube, TID, Izzy Tripp, DO, 100 mg at 10/01/22 0810  •  heparin (porcine) subcutaneous injection 5,000 Units, 5,000 Units, Subcutaneous, Q8H [address], Subhash Games, DO, 5,000 Units at 10/01/22 8811  •  hydrALAZINE (APRESOLINE) injection 5 mg, 5 mg, Intravenous, Q6H PRN, Izzy Tripp, DO  •  HYDROmorphone (DILAUDID) injection 0 5 mg, 0 5 mg, Intravenous, Q4H PRN, Subhash Games, DO, 0 5 mg at 10/01/22 0031  •  levalbuterol (XOPENEX) inhalation solution 1 25 mg, 1 25 mg, Nebulization, Q4H PRN, Izzy Tripp, DO  •  levalbuterol [facility]) inhalation solution 1 25 mg, 1 25 mg, Nebulization, Once, Sam Ratliff DO  •  nicotine (NICODERM CQ) 14 mg/24hr TD 24 hr patch 1 patch, 1 patch, Transdermal, Daily, Atul Adan DO, 1 patch at 09/28/22 0084  •  OLANZapine (ZyPREXA ZYDIS) dispersible tablet 5 mg, 5 mg, Oral, Q6H PRN, Mersalvador Tripp DO, 5 mg at 09/30/22 0676  •  ondansetron (ZOFRAN) injection 4 mg, 4 mg, Intravenous, Q4H PRN, Subhash Lopez DO  •  potassium chloride 20 mEq IVPB (premix), 20 mEq, Intravenous, BID, CARLITOS Mixon, Last Rate: 50 mL/hr at 10/01/22 0932, 20 mEq at 10/01/22 0932  •  QUEtiapine (SEROquel) tablet 50 mg, 50 mg, Per NG Tube, BID, Izzy Tripp DO, 50 mg at 10/01/22 5674    This note was completed in part utilizing [employer] Flu[employer] Direct Software    Grammatical errors, random word insertions, spelling mistakes, and incomplete sentences may be an occasional consequence of this system secondary to software limitations, ambient noise, and hardware issues  If you have any questions or concerns about the content, text, or information contained within the body of this dictation, please contact the provider for clarification      Rashi Montemayor DO, [facility]  10/1/2022 12:26 PM

## 2022-10-01 NOTE — QUICK NOTE
QUICK NOTE - Deterioration Index  Nimco Hilton 45 y o  female MRN: [id number]  Unit/Bed#: [facility] 68 2 [place] 217-01 Encounter: [id number]    Date Paged: 10/01/22  Time Paged: 12:26 AM  Room #: 883  Arrival Time: 12:30AM   Deterioration index score at time of page: 64 1  %  Spoke with Irma Hughes from primary team  Need to escalate level of care: no     PROBLEMS resulting in high DI score: Factors Contributing to Score    Contribution Factor Value   26% Melanie coma scale 12   18% Supplemental oxygen Nasal cannula   13 Age 45years old   13% Pulse 125   6% WBC count abnormal (14 78 Thousand/uL)   6% Systolic 659   5% Sodium 730 mmol/L     Contribution Factor Value   4% Pulse oximetry 91 %   4% Respiratory rate 18   2% BUN abnormal (40 mg/dL)   1% Hematocrit abnormal (31 0 %)   <1% Potassium 3 8 mmol/L   <1% Temperature 98 2 °F                 PLAN:    Patient appears to have increased agitation with difficulty being redirected  No improvement after Dilaudid administration  Recommend Ativan for anxiolysis  Repeat lab work to include CBC, CMP, Mag, phos, VBG, lactic acid and chest x-ray  Chest x-ray with persistent pulmonary infiltrates with interval change  Recommend diuresis  Continue treatment for MSSA infection  Patient requires continuous monitoring  Increased O2 to 4 L nasal cannula with sats in the low to mid 90s  At this time patient will be monitored by primary team   Plan of care was discussed with patient's nurse and primary attending  Please contact critical care via [employer] with any questions or concerns       Vitals:   Vitals:    09/30/22 1644 09/30/22 1848 09/30/22 1909 09/30/22 2328   BP: (!) 158/105 145/83  (!) 160/111   Pulse: (!) 115 (!) 113 100 (!) 116   Resp:    18   Temp:    98 2 °F (36 8 °C)   TempSrc:       SpO2: 96% 95% 97% 99%   Weight:       Height:           Respiratory:  SpO2: SpO2: 99 %, SpO2 Activity: SpO2 Activity: At Rest, SpO2 Device: O2 Device: Nasal cannula  Nasal Cannula O2 Flow Rate (L/min): 2 L/min    Temperature: Temp (24hrs), Av 1 °F (36 2 °C), Min:96 3 °F (35 7 °C), Max:98 2 °F (36 8 °C)  Current: Temperature: 98 2 °F (36 8 °C)    Labs:   Results from last 7 days   Lab Units 22  0513 22  0542   WBC Thousand/uL 14 78* 9 27 8 58   HEMOGLOBIN g/dL 9 8* 9 7* 11 5   HEMATOCRIT % 31 0* 30 4* 36 2   PLATELETS Thousands/uL 280 282 293   NEUTROS PCT % 90* 75 78*   MONOS PCT % 4 7 7     Results from last 7 days   Lab Units 22  0513 22  0542 22  0454 22  0551 22  0548   SODIUM mmol/L 145 145 144   < > 143 140   POTASSIUM mmol/L 3 8 3 2* 3 3*   < > 4 3 4 6   CHLORIDE mmol/L 104 103 103   < > 107 108   CO2 mmol/L 30 30 29   < > 20* 18*   BUN mg/dL 40* 41* 48*   < > 51* 52*   CREATININE mg/dL 6 37* 6 45* 6 94*   < > 7 67* 7 55*   CALCIUM mg/dL 9 1 8 9 8 9   < > 8 4 8 2*   ALK PHOS U/L 85  --   --   --  67 67   ALT U/L 62  --   --   --  215* 240*   AST U/L 68*  --   --   --  82* 111*    < > = values in this interval not displayed       Results from last 7 days   Lab Units 22  0513   MAGNESIUM mg/dL 1 8     Results from last 7 days   Lab Units 22  1834   LACTIC ACID mmol/L 1 5               Code Status: Level 1 - Full Code

## 2022-10-01 NOTE — ASSESSMENT & PLAN NOTE
Patient with flash pulmonary edema overnight in the setting of aggressive IV fluid as well as renal dysfunction  Continue Bumex 2 mg twice a day  Evidence of bilateral pleural effusion  Will plan for a net negative goal 1-2 L  CT of the chest ordered  Echocardiogram with preserved ejection fraction  High risk situation

## 2022-10-01 NOTE — QUICK NOTE
Informed by CC AP that patient seemed very agitated and would benefit from small dose of anxiolytic  CC team also reported she sounds more congested on the right side  Chest x-ray obtained and appears to be increased fluid on the left as compared to a previous chest x-ray  Repeat labs with improved creatinine, normal lactic, noted hypokalemia and hypo magnesemia  Patient given small dose of IV Ativan and we will replete potassium with 10 mEq IV (noted improving creatinine although still elevated) as well as 1 g of magnesium  Will then give a dose of Bumex 2 mg after electrolyte repletion has been performed  Continuing to monitor patient closely  Discussed with CC AP and they are in agreement with plan   Will repeat labs later this AM

## 2022-10-01 NOTE — PROGRESS NOTES
Goal TF of 50mL/hr reached  Will monitor for GI symptoms  Flush continues at 100mL/hr  No residual present

## 2022-10-01 NOTE — PLAN OF CARE
Problem: PAIN - ADULT  Goal: Verbalizes/displays adequate comfort level or baseline comfort level  Description: Interventions:  - Encourage patient to monitor pain and request assistance  - Assess pain using appropriate pain scale  - Administer analgesics based on type and severity of pain and evaluate response  - Implement non-pharmacological measures as appropriate and evaluate response  - Consider cultural and social influences on pain and pain management  - Notify physician/advanced practitioner if interventions unsuccessful or patient reports new pain  Outcome: Progressing     Problem: INFECTION - ADULT  Goal: Absence or prevention of progression during hospitalization  Description: INTERVENTIONS:  - Assess and monitor for signs and symptoms of infection  - Monitor lab/diagnostic results  - Monitor all insertion sites, i e  indwelling lines, tubes, and drains  - Monitor endotracheal if appropriate and nasal secretions for changes in amount and color  - [place] appropriate cooling/warming therapies per order  - Administer medications as ordered  - Instruct and encourage patient and family to use good hand hygiene technique  - Identify and instruct in appropriate isolation precautions for identified infection/condition  Outcome: Progressing     Problem: SAFETY ADULT  Goal: Patient will remain free of falls  Description: INTERVENTIONS:  - Educate patient/family on patient safety including physical limitations  - Instruct patient to call for assistance with activity   - Consult OT/PT to assist with strengthening/mobility   - Keep Call bell within reach  - Keep bed low and locked with side rails adjusted as appropriate  - Keep care items and personal belongings within reach  - Initiate and maintain comfort rounds  - Make Fall Risk Sign visible to staff  - Offer Toileting every 2 Hours, in advance of need  - Initiate/Maintain alarm  - Obtain necessary fall risk management equipment  - Apply yellow socks and bracelet for high fall risk patients  - Consider moving patient to room near nurses station  Outcome: Progressing  Goal: Maintain or return to baseline ADL function  Description: INTERVENTIONS:  -  Assess patient's ability to carry out ADLs; assess patient's baseline for ADL function and identify physical deficits which impact ability to perform ADLs (bathing, care of mouth/teeth, toileting, grooming, dressing, etc )  - Assess/evaluate cause of self-care deficits   - Assess range of motion  - Assess patient's mobility; develop plan if impaired  - Assess patient's need for assistive devices and provide as appropriate  - Encourage maximum independence but intervene and supervise when necessary  - Involve family in performance of ADLs  - Assess for home care needs following discharge   - Consider OT consult to assist with ADL evaluation and planning for discharge  - Provide patient education as appropriate  Outcome: Progressing  Goal: Maintains/Returns to pre admission functional level  Description: INTERVENTIONS:  - Perform BMAT or MOVE assessment daily    - Set and communicate daily mobility goal to care team and patient/family/caregiver  - Collaborate with rehabilitation services on mobility goals if consulted  - Perform Range of Motion 3 times a day  - Reposition patient every 2 hours    - Dangle patient 3 times a day  - Stand patient 3 times a day  - Ambulate patient 3 times a day  - Out of bed to chair 3 times a day   - Out of bed for meals 3 times a day  - Out of bed for toileting  - Record patient progress and toleration of activity level   Outcome: Progressing     Problem: DISCHARGE PLANNING  Goal: Discharge to home or other facility with appropriate resources  Description: INTERVENTIONS:  - Identify barriers to discharge w/patient and caregiver  - Arrange for needed discharge resources and transportation as appropriate  - Identify discharge learning needs (meds, wound care, etc )  - Arrange for interpretive services to assist at discharge as needed  - Refer to Case Management Department for coordinating discharge planning if the patient needs post-hospital services based on physician/advanced practitioner order or complex needs related to functional status, cognitive ability, or social support system  Outcome: Progressing     Problem: Knowledge Deficit  Goal: Patient/family/caregiver demonstrates understanding of disease process, treatment plan, medications, and discharge instructions  Description: Complete learning assessment and assess knowledge base    Interventions:  - Provide teaching at level of understanding  - Provide teaching via preferred learning methods  Outcome: Progressing     Problem: Potential for Falls  Goal: Patient will remain free of falls  Description: INTERVENTIONS:  - Educate patient/family on patient safety including physical limitations  - Instruct patient to call for assistance with activity   - Consult OT/PT to assist with strengthening/mobility   - Keep Call bell within reach  - Keep bed low and locked with side rails adjusted as appropriate  - Keep care items and personal belongings within reach  - Initiate and maintain comfort rounds  - Make Fall Risk Sign visible to staff  - Offer Toileting every 2 Hours, in advance of need  - Initiate/Maintain alarm  - Obtain necessary fall risk management equipment  - Apply yellow socks and bracelet for high fall risk patients  - Consider moving patient to room near nurses station  Outcome: Progressing     Problem: MOBILITY - ADULT  Goal: Maintain or return to baseline ADL function  Description: INTERVENTIONS:  -  Assess patient's ability to carry out ADLs; assess patient's baseline for ADL function and identify physical deficits which impact ability to perform ADLs (bathing, care of mouth/teeth, toileting, grooming, dressing, etc )  - Assess/evaluate cause of self-care deficits   - Assess range of motion  - Assess patient's mobility; develop plan if impaired  - Assess patient's need for assistive devices and provide as appropriate  - Encourage maximum independence but intervene and supervise when necessary  - Involve family in performance of ADLs  - Assess for home care needs following discharge   - Consider OT consult to assist with ADL evaluation and planning for discharge  - Provide patient education as appropriate  Outcome: Progressing  Goal: Maintains/Returns to pre admission functional level  Description: INTERVENTIONS:  - Perform BMAT or MOVE assessment daily    - Set and communicate daily mobility goal to care team and patient/family/caregiver  - Collaborate with rehabilitation services on mobility goals if consulted  - Perform Range of Motion 3 times a day  - Reposition patient every 2 hours    - Dangle patient 3 times a day  - Stand patient 3 times a day  - Ambulate patient 3 times a day  - Out of bed to chair 3 times a day   - Out of bed for meals 3 times a day  - Out of bed for toileting  - Record patient progress and toleration of activity level   Outcome: Progressing     Problem: Prexisting or High Potential for Compromised Skin Integrity  Goal: Skin integrity is maintained or improved  Description: INTERVENTIONS:  - Identify patients at risk for skin breakdown  - Assess and monitor skin integrity  - Assess and monitor nutrition and hydration status  - Monitor labs   - Assess for incontinence   - Turn and reposition patient  - Assist with mobility/ambulation  - Relieve pressure over bony prominences  - Avoid friction and shearing  - Provide appropriate hygiene as needed including keeping skin clean and dry  - Evaluate need for skin moisturizer/barrier cream  - Collaborate with interdisciplinary team   - Patient/family teaching  - Consider wound care consult   Outcome: Progressing     Problem: Nutrition/Hydration-ADULT  Goal: Nutrient/Hydration intake appropriate for improving, restoring or maintaining nutritional needs  Description: Monitor and assess patient's nutrition/hydration status for malnutrition  Collaborate with interdisciplinary team and initiate plan and interventions as ordered  Monitor patient's weight and dietary intake as ordered or per policy  Utilize nutrition screening tool and intervene as necessary  Determine patient's food preferences and provide high-protein, high-caloric foods as appropriate       INTERVENTIONS:  - Monitor oral intake, urinary output, labs, and treatment plans  - Assess nutrition and hydration status and recommend course of action  - Evaluate amount of meals eaten  - Assist patient with eating if necessary   - Allow adequate time for meals  - Recommend/ encourage appropriate diets, oral nutritional supplements, and vitamin/mineral supplements  - Order, calculate, and assess calorie counts as needed  - Recommend, monitor, and adjust tube feedings and TPN/PPN based on assessed needs  - Assess need for intravenous fluids  - Provide specific nutrition/hydration education as appropriate  - Include patient/family/caregiver in decisions related to nutrition  Outcome: Progressing     Problem: CARDIOVASCULAR - ADULT  Goal: Maintains optimal cardiac output and hemodynamic stability  Description: INTERVENTIONS:  - Monitor I/O, vital signs and rhythm  - Monitor for S/S and trends of decreased cardiac output  - Administer and titrate ordered vasoactive medications to optimize hemodynamic stability  - Assess quality of pulses, skin color and temperature  - Assess for signs of decreased coronary artery perfusion  - Instruct patient to report change in severity of symptoms  Outcome: Progressing     Problem: GASTROINTESTINAL - ADULT  Goal: Maintains adequate nutritional intake  Description: INTERVENTIONS:  - Monitor percentage of each meal consumed  - Identify factors contributing to decreased intake, treat as appropriate  - Assist with meals as needed  - Monitor I&O, weight, and lab values if indicated  - Obtain nutrition services referral as needed  Outcome: Progressing     Problem: METABOLIC, FLUID AND ELECTROLYTES - ADULT  Goal: Electrolytes maintained within normal limits  Description: INTERVENTIONS:  - Monitor labs and assess patient for signs and symptoms of electrolyte imbalances  - Administer electrolyte replacement as ordered  - Monitor response to electrolyte replacements, including repeat lab results as appropriate  - Instruct patient on fluid and nutrition as appropriate  Outcome: Progressing  Goal: Fluid balance maintained  Description: INTERVENTIONS:  - Monitor labs   - Monitor I/O and WT  - Instruct patient on fluid and nutrition as appropriate  - Assess for signs & symptoms of volume excess or deficit  Outcome: Progressing     Problem: SKIN/TISSUE INTEGRITY - ADULT  Goal: Skin Integrity remains intact(Skin Breakdown Prevention)  Description: Assess:  -Perform Erickson assessment   -Clean and moisturize skin   -Inspect skin when repositioning, toileting, and assisting with ADLS  -Assess under medical devices   -Assess extremities for adequate circulation and sensation     Bed Management:  -Have minimal linens on bed & keep smooth, unwrinkled  -Change linens as needed when moist or perspiring  -Avoid sitting or lying in one position for more than 2 hours while in bed  -Keep HOB at 45 degrees     Toileting:  -Offer bedside commode  -Assess for incontinence   -Use incontinent care products after each incontinent episode     Activity:  -Mobilize patient 3 times a day  -Encourage activity and walks on unit  -Encourage or provide ROM exercises   -Turn and reposition patient every 2 Hours  -Use appropriate equipment to lift or move patient in bed  -Instruct/ Assist with weight shifting every 2 when out of bed in chair  -Consider limitation of chair time 2 hour intervals    Skin Care:  -Avoid use of baby powder, tape, friction and shearing, hot water or constrictive clothing  -Relieve pressure over bony prominences   -Do not massage red bony areas    Next Steps:  -Teach patient strategies to minimize risks    -Consider consults to  interdisciplinary teams   Outcome: Progressing  Goal: Incision(s), wounds(s) or drain site(s) healing without S/S of infection  Description: INTERVENTIONS  - Assess and document dressing, incision, wound bed, drain sites and surrounding tissue  - Provide patient and family education  - Perform skin care/dressing changes   Outcome: Progressing     Problem: MUSCULOSKELETAL - ADULT  Goal: Maintain or return mobility to safest level of function  Description: INTERVENTIONS:  - Assess patient's ability to carry out ADLs; assess patient's baseline for ADL function and identify physical deficits which impact ability to perform ADLs (bathing, care of mouth/teeth, toileting, grooming, dressing, etc )  - Assess/evaluate cause of self-care deficits   - Assess range of motion  - Assess patient's mobility  - Assess patient's need for assistive devices and provide as appropriate  - Encourage maximum independence but intervene and supervise when necessary  - Involve family in performance of ADLs  - Assess for home care needs following discharge   - Consider OT consult to assist with ADL evaluation and planning for discharge  - Provide patient education as appropriate  Outcome: Progressing     Problem: NEUROSENSORY - ADULT  Goal: Achieves maximal functionality and self care  Description: INTERVENTIONS  - Monitor swallowing and airway patency with patient fatigue and changes in neurological status  - Encourage and assist patient to increase activity and self care     - Encourage visually impaired, hearing impaired and aphasic patients to use assistive/communication devices  Outcome: Not Progressing     Problem: RESPIRATORY - ADULT  Goal: Achieves optimal ventilation and oxygenation  Description: INTERVENTIONS:  - Assess for changes in respiratory status  - Assess for changes in mentation and behavior  - Position to facilitate oxygenation and minimize respiratory effort  - Oxygen administered by appropriate delivery if ordered  - Initiate smoking cessation education as indicated  - Encourage broncho-pulmonary hygiene including cough, deep breathe, Incentive Spirometry  - Assess the need for suctioning and aspirate as needed  - Assess and instruct to report SOB or any respiratory difficulty  - Respiratory Therapy support as indicated  Outcome: Not Progressing     Problem: INFECTION - ADULT  Goal: Absence of fever/infection during neutropenic period  Description: INTERVENTIONS:  - Monitor WBC    Outcome: Completed

## 2022-10-01 NOTE — PLAN OF CARE
Problem: PAIN - ADULT  Goal: Verbalizes/displays adequate comfort level or baseline comfort level  Description: Interventions:  - Encourage patient to monitor pain and request assistance  - Assess pain using jesi  Problem: PAIN - ADULT  Goal: Verbalizes/displays adequate comfort level or baseline comfort level  Description: Interventions:  - Encourage patient to monitor pain and request assistance  - Assess pain using appropriate pain scale  - Administer analgesics based on type and severity of pain and evaluate response  - Implement non-pharmacological measures as appropriate and evaluate response  - Consider cultural and social influences on pain and pain management  - Notify physician/advanced practitioner if interventions unsuccessful or patient reports new pain  Outcome: Progressing     Problem: INFECTION - ADULT  Goal: Absence or prevention of progression during hospitalization  Description: INTERVENTIONS:  - Assess and monitor for signs and symptoms of infection  - Monitor lab/diagnostic results  - Monitor all insertion sites, i e  indwelling lines, tubes, and drains  - Monitor endotracheal if appropriate and nasal secretions for changes in amount and color  - [place] appropriate cooling/warming therapies per order  - Administer medications as ordered  - Instruct and encourage patient and family to use good hand hygiene technique  - Identify and instruct in appropriate isolation precautions for identified infection/condition  Outcome: Progressing  Goal: Absence of fever/infection during neutropenic period  Description: INTERVENTIONS:  - Monitor WBC    Outcome: Progressing     Problem: SAFETY ADULT  Goal: Patient will remain free of falls  Description: INTERVENTIONS:  - Educate patient/family on patient safety including physical limitations  - Instruct patient to call for assistance with activity   - Consult OT/PT to assist with strengthening/mobility   - Keep Call bell within reach  - Keep bed low and locked with side rails adjusted as appropriate  - Keep care items and personal belongings within reach  - Initiate and maintain comfort rounds  - Make Fall Risk Sign visible to staff  - Offer Toileting every 2 Hours, in advance of need  - Initiate/Maintain bed alarm  - Obtain necessary fall risk management equipment:   - Apply yellow socks and bracelet for high fall risk patients  - Consider moving patient to room near nurses station  Outcome: Progressing  Goal: Maintain or return to baseline ADL function  Description: INTERVENTIONS:  -  Assess patient's ability to carry out ADLs; assess patient's baseline for ADL function and identify physical deficits which impact ability to perform ADLs (bathing, care of mouth/teeth, toileting, grooming, dressing, etc )  - Assess/evaluate cause of self-care deficits   - Assess range of motion  - Assess patient's mobility; develop plan if impaired  - Assess patient's need for assistive devices and provide as appropriate  - Encourage maximum independence but intervene and supervise when necessary  - Involve family in performance of ADLs  - Assess for home care needs following discharge   - Consider OT consult to assist with ADL evaluation and planning for discharge  - Provide patient education as appropriate  Outcome: Progressing  Goal: Maintains/Returns to pre admission functional level  Description: INTERVENTIONS:  - Perform BMAT or MOVE assessment daily    - Set and communicate daily mobility goal to care team and patient/family/caregiver  - Collaborate with rehabilitation services on mobility goals if consulted  - Perform Range of Motion 4 times a day  - Reposition patient every 2 hours    - Dangle patient 3 times a day  - Stand patient 3 times a day  - Ambulate patient 3 times a day  - Out of bed to chair 3 times a day   - Out of bed for meals 3 times a day  - Out of bed for toileting  - Record patient progress and toleration of activity level   Outcome: Progressing Problem: DISCHARGE PLANNING  Goal: Discharge to home or other facility with appropriate resources  Description: INTERVENTIONS:  - Identify barriers to discharge w/patient and caregiver  - Arrange for needed discharge resources and transportation as appropriate  - Identify discharge learning needs (meds, wound care, etc )  - Arrange for interpretive services to assist at discharge as needed  - Refer to Case Management Department for coordinating discharge planning if the patient needs post-hospital services based on physician/advanced practitioner order or complex needs related to functional status, cognitive ability, or social support system  Outcome: Progressing     Problem: Knowledge Deficit  Goal: Patient/family/caregiver demonstrates understanding of disease process, treatment plan, medications, and discharge instructions  Description: Complete learning assessment and assess knowledge base    Interventions:  - Provide teaching at level of understanding  - Provide teaching via preferred learning methods  Outcome: Progressing     Problem: Potential for Falls  Goal: Patient will remain free of falls  Description: INTERVENTIONS:  - Educate patient/family on patient safety including physical limitations  - Instruct patient to call for assistance with activity   - Consult OT/PT to assist with strengthening/mobility   - Keep Call bell within reach  - Keep bed low and locked with side rails adjusted as appropriate  - Keep care items and personal belongings within reach  - Initiate and maintain comfort rounds  - Make Fall Risk Sign visible to staff  - Offer Toileting every 2 Hours, in advance of need  - Initiate/Maintain bed alarm  - Obtain necessary fall risk management equipment:   - Apply yellow socks and bracelet for high fall risk patients  - Consider moving patient to room near nurses station  Outcome: Progressing     Problem: MOBILITY - ADULT  Goal: Maintain or return to baseline ADL function  Description: INTERVENTIONS:  -  Assess patient's ability to carry out ADLs; assess patient's baseline for ADL function and identify physical deficits which impact ability to perform ADLs (bathing, care of mouth/teeth, toileting, grooming, dressing, etc )  - Assess/evaluate cause of self-care deficits   - Assess range of motion  - Assess patient's mobility; develop plan if impaired  - Assess patient's need for assistive devices and provide as appropriate  - Encourage maximum independence but intervene and supervise when necessary  - Involve family in performance of ADLs  - Assess for home care needs following discharge   - Consider OT consult to assist with ADL evaluation and planning for discharge  - Provide patient education as appropriate  Outcome: Progressing  Goal: Maintains/Returns to pre admission functional level  Description: INTERVENTIONS:  - Perform BMAT or MOVE assessment daily    - Set and communicate daily mobility goal to care team and patient/family/caregiver  - Collaborate with rehabilitation services on mobility goals if consulted  - Perform Range of Motion 4 times a day  - Reposition patient every 2 hours    - Dangle patient 3 times a day  - Stand patient 3 times a day  - Ambulate patient 3 times a day  - Out of bed to chair 3 times a day   - Out of bed for meals 3 times a day  - Out of bed for toileting  - Record patient progress and toleration of activity level   Outcome: Progressing     Problem: Prexisting or High Potential for Compromised Skin Integrity  Goal: Skin integrity is maintained or improved  Description: INTERVENTIONS:  - Identify patients at risk for skin breakdown  - Assess and monitor skin integrity  - Assess and monitor nutrition and hydration status  - Monitor labs   - Assess for incontinence   - Turn and reposition patient  - Assist with mobility/ambulation  - Relieve pressure over bony prominences  - Avoid friction and shearing  - Provide appropriate hygiene as needed including keeping skin clean and dry  - Evaluate need for skin moisturizer/barrier cream  - Collaborate with interdisciplinary team   - Patient/family teaching  - Consider wound care consult   Outcome: Progressing     Problem: Nutrition/Hydration-ADULT  Goal: Nutrient/Hydration intake appropriate for improving, restoring or maintaining nutritional needs  Description: Monitor and assess patient's nutrition/hydration status for malnutrition  Collaborate with interdisciplinary team and initiate plan and interventions as ordered  Monitor patient's weight and dietary intake as ordered or per policy  Utilize nutrition screening tool and intervene as necessary  Determine patient's food preferences and provide high-protein, high-caloric foods as appropriate       INTERVENTIONS:  - Monitor oral intake, urinary output, labs, and treatment plans  - Assess nutrition and hydration status and recommend course of action  - Evaluate amount of meals eaten  - Assist patient with eating if necessary   - Allow adequate time for meals  - Recommend/ encourage appropriate diets, oral nutritional supplements, and vitamin/mineral supplements  - Order, calculate, and assess calorie counts as needed  - Recommend, monitor, and adjust tube feedings and TPN/PPN based on assessed needs  - Assess need for intravenous fluids  - Provide specific nutrition/hydration education as appropriate  - Include patient/family/caregiver in decisions related to nutrition  Outcome: Progressing   ropriate pain scale  - Administer analgesics based on type and severity of pain and evaluate response  - Implement non-pharmacological measures as appropriate and evaluate response  - Consider cultural and social influences on pain and pain management  - Notify physician/advanced practitioner if interventions unsuccessful or patient reports new pain  Outcome: Progressing

## 2022-10-01 NOTE — PROGRESS NOTES
[address] [facility]  Progress Note - Normie Holding 1970, 45 y o  female MRN: [id number]  Unit/Bed#: [place] 68 2 -01 Encounter: [id number]  Primary Care Provider: Prashanth Sinha MD   Date and time admitted to hospital: 9/19/2022  8:04 AM    * Encephalopathy acute  Assessment & Plan  · Acute encephalopathy secondary to gabapentin and morphine with subsequent renal failure, potentially component of psychiatric illness, restarted back on Psych medications  · Increased Seroquel 50 mg every 12 hours  · Likely a component of delirium as well given prolonged hospital stay  · Continue thiamine high-dose given potential concern for Wernicke's encephalopathy  · Holding morphine since admission due to over-sedation  · LP and MRI negative for acute pathology  · Found to be positive for methicillin sensitive Staph aureus- consideration for Toxic metabolic encephalopathy  · Restarted gabapentin - given concern for withdrawal  · Did give trial of hydromorphone given patient has previously been on high-dose pain medication    The patient herself endorses no pain      Aspiration pneumonitis [facility])  Assessment & Plan  Patient with significant wheezing as well as new right lower lobe opacity seen on imaging study  Likely aspiration pneumonia/pneumonitis in the setting of encephalopathy  Patient remains NPO  Continue alternative nutrition and hydration until patient is more alert awake    Pulmonary edema  Assessment & Plan  Patient with flash pulmonary edema overnight in the setting of aggressive IV fluid as well as renal dysfunction  Continue Bumex 2 mg twice a day  Evidence of bilateral pleural effusion  Will plan for a net negative goal 1-2 L  CT of the chest ordered  Echocardiogram with preserved ejection fraction  High risk situation    Bacteremia due to methicillin susceptible Staphylococcus aureus (MSSA)  Assessment & Plan  Patient with methicillin sensitive Staph aureus bacteremia  Echo with no vegetation  Will place CHERYLE referral per ID recommendations- unfortunately developed respiratory distress this morning and will need to be optimized prior to re-attempt  Potentially may have been present on admission as the patient has multiple wounds on her legs from previous fall 4 days ago  In differential diagnosis includes septic thrombophlebitis  Continue Ancef, started on prophylaxis Vancomycin to prevent C diff infection  Infectious Disease consultation reviewed  Localized swelling of right upper extremity  Assessment & Plan  Evidence of streaking of the right upper extremity, although difficult to determine given ecchymosis  Blood cultures positive for methicillin sensitive Staph aureus    Leg edema, right  Assessment & Plan  · Likely source of rhabdomyolysis  No evidence of compartment syndrome fracture or DVT on imaging  · 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  · Resolved after IV diuretics  ·       D-dimer, elevated  Assessment & Plan  · Elevated D-dimer may be related to fall  · Lower extremity duplex negative for DVT  · Renal dysfunction cannot check CTA of chest but doubt PE  · Discontinued heparin infusion and transitioned to prophylaxis dose    Traumatic rhabdomyolysis [facility])  Assessment & Plan  · Secondary to fall, patient had been laying in bed for approximately 4 days and family members had thought she was taking a deep nap  · She was subsequently brought to the hospital and found to be in acute rhabdomyolysis  · Had been on multiple sedating medications including MS Contin as well as gabapentin, which likely were contributing to sedation  · Was started on aggressive IV hydration, still with acute kidney injury  ·     Results from last 7 days   Lab Units 09/29/22  0513 09/27/22  0557 09/26/22  0454 09/25/22  0551   CK TOTAL U/L 624* 848* 990* 793*       Abnormal CT of the chest  Assessment & Plan  · Left-sided opacities noted  COVID negative  Transaminitis  Assessment & Plan  · Secondary to rhabdomyolysis  No evidence of liver injury on CT imaging  · Seen by GI  Hepatic duplex also negative  Results from last 7 days   Lab Units 10/01/22  0507 10/01/22  0145 09/30/22  0137 09/25/22  0551   AST U/L 56* 66* 68* 82*   ALT U/L 47 45 62 215*   TOTAL BILIRUBIN mg/dL 0 36 0 44 0 40 0 42       ARF (acute renal failure) (HCC)  Assessment & Plan  · ANUJA/ATN secondary to rhabdomyolysis  No evidence of renal obstruction on imaging  · Appreciate nephrology evaluation  Awaiting renal recovery  · No emergent need for hemodialysis, consent obtained from family members  · Renal function improving    Results from last 7 days   Lab Units 10/01/22  0507 10/01/22  0145 09/30/22  0137 09/29/22  0513 09/28/22  0542 09/27/22  0557 09/26/22  0454 09/25/22  0551   BUN mg/dL 39* 41* 40* 41* 48* 50* 53* 51*   CREATININE mg/dL 5 78* 5 88* 6 37* 6 45* 6 94* 7 08* 7 63* 7 67*   EGFR ml/min/1 73sq m 7 7 6 6 6 6 5 5       DDD (degenerative disc disease), lumbosacral  Assessment & Plan  · Lumbar radiculopathy due for surgery  · Prior to admission was on gabapentin and morphine IR 15 mg   · Confirmed on PDMP  · Gabapentin resumed at lower dose  Tobacco abuse  Assessment & Plan  · Nicotine patch ordered    Depression  Assessment & Plan  · Was on venlafaxine and quetiapine- has been refusing these medications since admission, part of this due to lethargy, part of it due to NPO status   ·   · Does have history of suicide attempt a currently does not show any signs of thoughts of self-harm  · Previously on Effexor, which patient has been refusing either spitting out medication or chewing  · Psychiatry consultation placed and patient case reviewed with on-call psychiatry  · Please give Zyprexa 5 mg by mouth every 6 hours        [address] Internal Medicine Progress Note  Patient: Nelson Suárez 45 y o  female   MRN: [id number]  PCP: Howie Yanes MD  Unit/Bed#: \[facility]"" 68 2 MS 217-01 Encounter: [id number]  Date Of Visit: 10/01/22    Assessment:    Principal Problem:    Encephalopathy acute  Active Problems:    Depression    Tobacco abuse    DDD (degenerative disc disease), lumbosacral    ARF (acute renal failure) (HCC)    Transaminitis    Abnormal CT of the chest    Traumatic rhabdomyolysis (HCC)    D-dimer, elevated    Leg edema, right    Localized swelling of right upper extremity    Bacteremia due to methicillin susceptible Staphylococcus aureus (MSSA)    Pulmonary edema    Aspiration pneumonitis (HCC)      Plan:    · Check CT of the chest  · Continue IV diuresis  · Check labs in a m  · Eventual CHERYLE once respiratory status improves       VTE Pharmacologic Prophylaxis:   Pharmacologic: Heparin  Mechanical VTE Prophylaxis in Place: Yes    Patient Centered Rounds: I have performed bedside rounds with nursing staff today  Discussions with Specialists or Other Care Team Provider:  Discussed with     Education and Discussions with Family / Patient:  Patient     Time Spent for Care: 45 minutes  More than 50% of total time spent on counseling and coordination of care as described above  Current Length of Stay: 12 day(s)    Current Patient Status: Inpatient   Certification Statement: The patient will continue to require additional inpatient hospital stay due to Confusion, hypoxia,     Discharge Plan / Estimated Discharge Date: To be determined    Code Status: Level 1 - Full Code      Subjective:   Seen and examined, patient was reported overnight to a pulled out her Dobbhoff  Was noted to be significantly hypoxic, chest CT with evidence of aspiration pneumonia as well as pleural effusion    Patient provides no other history due to confusion    A complete and comprehensive 14 point organ system review has been performed and all other systems are negative other than stated above      Objective:     Vitals:   Temp (24hrs), Av 3 °F (36 3 °C), Min:96 4 °F (35 8 °C), Max:98 2 °F (36 8 °C)    Temp:  [96 4 °F (35 8 °C)-98 2 °F (36 8 °C)] 97 3 °F (36 3 °C)  HR:  [100-127] 117  Resp:  [18-24] 20  BP: (145-173)/() 163/114  SpO2:  [86 %-99 %] 94 %  Body mass index is 22 6 kg/m²  Input and Output Summary (last 24 hours): Intake/Output Summary (Last 24 hours) at 10/1/2022 1629  Last data filed at 10/1/2022 1120  Gross per 24 hour   Intake 452 ml   Output --   Net 452 ml       Physical Exam:     General:  Appears chronically ill, cachectic  HEENT: atraumatic, PERRLA, moist mucosa, normal pharynx, normal tonsils and adenoids, normal tongue, no fluid in sinuses  Neck: Trachea midline, no carotid bruit, no masses  Respiratory: normal chest wall expansion, CTA B, no r/r/w, no rubs  Cardiovascular: RRR, no m/r/g, Normal S1 and S2  Abdomen: Soft, non-tender, non-distended, normal bowel sounds in all quadrants, no hepatosplenomegaly, no tympany  Rectal: deferred  Musculoskeletal:  Moves all, but in restraints due to concern for interfering with device  Integumentary: warm, dry, and pink, with no rash, purpura, or petechia  Heme/Lymph: no lymphadenopathy, no bruises  Neurological:  Unable to assess   Psychiatric:  Confused      Additional Data:     Labs:    Results from last 7 days   Lab Units 10/01/22  0507   WBC Thousand/uL 18 54*   HEMOGLOBIN g/dL 9 7*   HEMATOCRIT % 31 2*   PLATELETS Thousands/uL 325   NEUTROS PCT % 85*   LYMPHS PCT % 9*   MONOS PCT % 5   EOS PCT % 0     Results from last 7 days   Lab Units 10/01/22  0507   POTASSIUM mmol/L 3 2*   CHLORIDE mmol/L 102   CO2 mmol/L 33*   BUN mg/dL 39*   CREATININE mg/dL 5 78*   CALCIUM mg/dL 9 0   ALK PHOS U/L 76   ALT U/L 47   AST U/L 56*           * I Have Reviewed All Lab Data Listed Above  * Additional Pertinent Lab Tests Reviewed:  [facility] Admission Reviewed    Imaging:    Imaging Reports Reviewed Today Include:  No new imaging  Imaging Personally Reviewed by Myself Includes:  No new imaging    Recent Cultures (last 7 days):     Results from last 7 days   Lab Units 09/28/22  1145 09/28/22  1144 09/26/22  1818   BLOOD CULTURE  No Growth at 48 hrs  No Growth at 48 hrs  Staphylococcus aureus*  Staphylococcus aureus*   GRAM STAIN RESULT   --   --  Gram positive cocci in clusters*  Gram positive cocci in clusters*       Last 24 Hours Medication List:   Current Facility-Administered Medications   Medication Dose Route Frequency Provider Last Rate   • acetaminophen  650 mg Oral Q6H PRN Paulette Zhu, DO     • ALPRAZolam  0 5 mg Per NG Tube HS PRN CARLITOS Haque     • amLODIPine  10 mg Oral Daily Meena Simms MD     • bumetanide  2 mg Intravenous BID Marolyn Lesch, DO     • cefazolin  1,000 mg Intravenous Q24H Álvaro Knox PA-C 1,000 mg (09/30/22 2005)   • gabapentin  100 mg Per NG Tube TID Marolyn Lesch, DO     • heparin (porcine)  5,000 Units Subcutaneous [facility] Atul Adan DO     • hydrALAZINE  5 mg Intravenous Q6H PRN Marolyn Lesch, DO     • HYDROmorphone  0 5 mg Intravenous Q4H PRN Paulette Zhu, DO     • levalbuterol  1 25 mg Nebulization Q4H PRN Marolyn Lesch, DO     • levalbuterol  1 25 mg Nebulization Once Sam Younbglood DO     • nicotine  1 patch Transdermal Daily Atul Adan DO     • OLANZapine  5 mg Oral Q6H PRN Marolyn Lesch, DO     • ondansetron  4 mg Intravenous Q4H PRN [place], DO     • potassium chloride  20 mEq Intravenous BID CARLITOS Hayes 20 mEq (10/01/22 0932)   • QUEtiapine  50 mg Per NG Tube BID Sam Youngblood DO          Today, Patient Was Seen By: Marolyn Lesch    ** Please Note: This note was completed in part utilizing M-Modal Fluency Direct Software  Grammatical errors, random word insertions, spelling mistakes, and incomplete sentences may be an occasional consequence of this system secondary to software limitations, ambient noise, and hardware issues    If you have any questions or concerns about the content, text, or information contained within the body of this dictation, please contact the provider for clarification   **

## 2022-10-01 NOTE — ASSESSMENT & PLAN NOTE
· Secondary to fall, patient had been laying in bed for approximately 4 days and family members had thought she was taking a deep nap  · She was subsequently brought to the hospital and found to be in acute rhabdomyolysis    · Had been on multiple sedating medications including MS Contin as well as gabapentin, which likely were contributing to sedation  · Was started on aggressive IV hydration, still with acute kidney injury  ·     Results from last 7 days   Lab Units 09/29/22  0513 09/27/22  0557 09/26/22  0454 09/25/22  0551   CK TOTAL U/L 624* 848* 990* 793*

## 2022-10-01 NOTE — ASSESSMENT & PLAN NOTE
· ANUJA/ATN secondary to rhabdomyolysis  No evidence of renal obstruction on imaging  · Appreciate nephrology evaluation  Awaiting renal recovery      · No emergent need for hemodialysis, consent obtained from family members  · Renal function improving    Results from last 7 days   Lab Units 10/01/22  0507 10/01/22  0145 09/30/22  0137 09/29/22  0513 09/28/22  0542 09/27/22  0557 09/26/22  0454 09/25/22  0551   BUN mg/dL 39* 41* 40* 41* 48* 50* 53* 51*   CREATININE mg/dL 5 78* 5 88* 6 37* 6 45* 6 94* 7 08* 7 63* 7 67*   EGFR ml/min/1 73sq m 7 7 6 6 6 6 5 5

## 2022-10-01 NOTE — PLAN OF CARE
Problem: PAIN - ADULT  Goal: Verbalizes/displays adequate comfort level or baseline comfort level  Description: Interventions:  - Encourage patient to monitor pain and request assistance  - Assess pain using appropriate pain scale  - Administer analgesics based on type and severity of pain and evaluate response  - Implement non-pharmacological measures as appropriate and evaluate response  - Consider cultural and social influences on pain and pain management  - Notify physician/advanced practitioner if interventions unsuccessful or patient reports new pain  Outcome: Progressing     Problem: INFECTION - ADULT  Goal: Absence or prevention of progression during hospitalization  Description: INTERVENTIONS:  - Assess and monitor for signs and symptoms of infection  - Monitor lab/diagnostic results  - Monitor all insertion sites, i e  indwelling lines, tubes, and drains  - Monitor endotracheal if appropriate and nasal secretions for changes in amount and color  - [place] appropriate cooling/warming therapies per order  - Administer medications as ordered  - Instruct and encourage patient and family to use good hand hygiene technique  - Identify and instruct in appropriate isolation precautions for identified infection/condition  Outcome: Progressing  Goal: Absence of fever/infection during neutropenic period  Description: INTERVENTIONS:  - Monitor WBC    Outcome: Progressing     Problem: SAFETY ADULT  Goal: Patient will remain free of falls  Description: INTERVENTIONS:  - Educate patient/family on patient safety including physical limitations  - Instruct patient to call for assistance with activity   - Consult OT/PT to assist with strengthening/mobility   - Keep Call bell within reach  - Keep bed low and locked with side rails adjusted as appropriate  - Keep care items and personal belongings within reach  - Initiate and maintain comfort rounds  - Make Fall Risk Sign visible to staff  - Offer Toileting every 2 Hours, in advance of need  - Initiate/Maintain bed alarm  - Obtain necessary fall risk management equipment: bed rails,       Soft wrist restraints  - Apply yellow socks and bracelet for high fall risk patients  - Consider moving patient to room near nurses station  Outcome: Progressing  Goal: Maintain or return to baseline ADL function  Description: INTERVENTIONS:  -  Assess patient's ability to carry out ADLs; assess patient's baseline for ADL function and identify physical deficits which impact ability to perform ADLs (bathing, care of mouth/teeth, toileting, grooming, dressing, etc )  - Assess/evaluate cause of self-care deficits   - Assess range of motion  - Assess patient's mobility; develop plan if impaired  - Assess patient's need for assistive devices and provide as appropriate  - Encourage maximum independence but intervene and supervise when necessary  - Involve family in performance of ADLs  - Assess for home care needs following discharge   - Consider OT consult to assist with ADL evaluation and planning for discharge  - Provide patient education as appropriate  Outcome: Progressing  Goal: Maintains/Returns to pre admission functional level  Description: INTERVENTIONS:  - Perform BMAT or MOVE assessment daily    - Set and communicate daily mobility goal to care team and patient/family/caregiver  - Collaborate with rehabilitation services on mobility goals if consulted  - Reposition patient every 2 hours    - Record patient progress and toleration of activity level   Outcome: Progressing     Problem: DISCHARGE PLANNING  Goal: Discharge to home or other facility with appropriate resources  Description: INTERVENTIONS:  - Identify barriers to discharge w/patient and caregiver  - Arrange for needed discharge resources and transportation as appropriate  - Identify discharge learning needs (meds, wound care, etc )  - Arrange for interpretive services to assist at discharge as needed  - Refer to Case Management Department for coordinating discharge planning if the patient needs post-hospital services based on physician/advanced practitioner order or complex needs related to functional status, cognitive ability, or social support system  Outcome: Progressing     Problem: Knowledge Deficit  Goal: Patient/family/caregiver demonstrates understanding of disease process, treatment plan, medications, and discharge instructions  Description: Complete learning assessment and assess knowledge base    Interventions:  - Provide teaching at level of understanding  - Provide teaching via preferred learning methods  Outcome: Progressing     Problem: Potential for Falls  Goal: Patient will remain free of falls  Description: INTERVENTIONS:  - Educate patient/family on patient safety including physical limitations  - Instruct patient to call for assistance with activity   - Consult OT/PT to assist with strengthening/mobility   - Keep Call bell within reach  - Keep bed low and locked with side rails adjusted as appropriate  - Keep care items and personal belongings within reach  - Initiate and maintain comfort rounds  - Make Fall Risk Sign visible to staff  - Offer Toileting every 2 Hours, in advance of need  - Initiate/Maintain bed alarm  - Obtain necessary fall risk management equipment: bed rails, restraints  - Apply yellow socks and bracelet for high fall risk patients  - Consider moving patient to room near nurses station  Outcome: Progressing     Problem: MOBILITY - ADULT  Goal: Maintain or return to baseline ADL function  Description: INTERVENTIONS:  -  Assess patient's ability to carry out ADLs; assess patient's baseline for ADL function and identify physical deficits which impact ability to perform ADLs (bathing, care of mouth/teeth, toileting, grooming, dressing, etc )  - Assess/evaluate cause of self-care deficits   - Assess range of motion  - Assess patient's mobility; develop plan if impaired  - Assess patient's need for assistive devices and provide as appropriate  - Encourage maximum independence but intervene and supervise when necessary  - Involve family in performance of ADLs  - Assess for home care needs following discharge   - Consider OT consult to assist with ADL evaluation and planning for discharge  - Provide patient education as appropriate  Outcome: Progressing  Goal: Maintains/Returns to pre admission functional level  Description: INTERVENTIONS:  - Perform BMAT or MOVE assessment daily    - Set and communicate daily mobility goal to care team and patient/family/caregiver  - Collaborate with rehabilitation services on mobility goals if consulted  - Reposition patient every 2 hours  - Record patient progress and toleration of activity level   Outcome: Progressing     Problem: Prexisting or High Potential for Compromised Skin Integrity  Goal: Skin integrity is maintained or improved  Description: INTERVENTIONS:  - Identify patients at risk for skin breakdown  - Assess and monitor skin integrity  - Assess and monitor nutrition and hydration status  - Monitor labs   - Assess for incontinence   - Turn and reposition patient  - Assist with mobility/ambulation  - Relieve pressure over bony prominences  - Avoid friction and shearing  - Provide appropriate hygiene as needed including keeping skin clean and dry  - Evaluate need for skin moisturizer/barrier cream  - Collaborate with interdisciplinary team   - Patient/family teaching  - Consider wound care consult   Outcome: Progressing     Problem: Nutrition/Hydration-ADULT  Goal: Nutrient/Hydration intake appropriate for improving, restoring or maintaining nutritional needs  Description: Monitor and assess patient's nutrition/hydration status for malnutrition  Collaborate with interdisciplinary team and initiate plan and interventions as ordered  Monitor patient's weight and dietary intake as ordered or per policy  Utilize nutrition screening tool and intervene as necessary  Determine patient's food preferences and provide high-protein, high-caloric foods as appropriate       INTERVENTIONS:  - Monitor oral intake, urinary output, labs, and treatment plans  - Assess nutrition and hydration status and recommend course of action  - Evaluate amount of meals eaten  - Assist patient with eating if necessary   - Allow adequate time for meals  - Recommend/ encourage appropriate diets, oral nutritional supplements, and vitamin/mineral supplements  - Order, calculate, and assess calorie counts as needed  - Recommend, monitor, and adjust tube feedings and TPN/PPN based on assessed needs  - Assess need for intravenous fluids  - Provide specific nutrition/hydration education as appropriate  - Include patient/family/caregiver in decisions related to nutrition  Outcome: Progressing

## 2022-10-02 ENCOUNTER — APPOINTMENT (INPATIENT)
Dept: NON INVASIVE DIAGNOSTICS | Facility: HOSPITAL | Age: 52
End: 2022-10-02

## 2022-10-02 PROBLEM — E87.6 HYPOKALEMIA: Status: ACTIVE | Noted: 2022-10-02

## 2022-10-02 PROBLEM — E87.0 HYPERNATREMIA: Status: ACTIVE | Noted: 2022-10-02

## 2022-10-02 LAB
ANION GAP SERPL CALCULATED.3IONS-SCNC: 8 MMOL/L (ref 4–13)
APICAL FOUR CHAMBER EJECTION FRACTION: 40 %
BASOPHILS # BLD AUTO: 0.04 THOUSANDS/ÂΜL (ref 0–0.1)
BASOPHILS NFR BLD AUTO: 0 % (ref 0–1)
BUN SERPL-MCNC: 36 MG/DL (ref 5–25)
CALCIUM SERPL-MCNC: 9.8 MG/DL (ref 8.3–10.1)
CHLORIDE SERPL-SCNC: 102 MMOL/L (ref 96–108)
CO2 SERPL-SCNC: 38 MMOL/L (ref 21–32)
CREAT SERPL-MCNC: 4.79 MG/DL (ref 0.6–1.3)
EOSINOPHIL # BLD AUTO: 0.06 THOUSAND/ÂΜL (ref 0–0.61)
EOSINOPHIL NFR BLD AUTO: 0 % (ref 0–6)
ERYTHROCYTE [DISTWIDTH] IN BLOOD BY AUTOMATED COUNT: 14.2 % (ref 11.6–15.1)
FRACTIONAL SHORTENING: 19 (ref 28–44)
GFR SERPL CREATININE-BSD FRML MDRD: 9 ML/MIN/1.73SQ M
GLUCOSE SERPL-MCNC: 165 MG/DL (ref 65–140)
HCT VFR BLD AUTO: 38.4 % (ref 34.8–46.1)
HGB BLD-MCNC: 12.2 G/DL (ref 11.5–15.4)
IMM GRANULOCYTES # BLD AUTO: 0.1 THOUSAND/UL (ref 0–0.2)
IMM GRANULOCYTES NFR BLD AUTO: 1 % (ref 0–2)
INTERVENTRICULAR SEPTUM IN DIASTOLE (PARASTERNAL SHORT AXIS VIEW): 0.9 CM
INTERVENTRICULAR SEPTUM: 0.9 CM (ref 0.6–1.1)
LEFT INTERNAL DIMENSION IN SYSTOLE: 3.8 CM (ref 2.1–4)
LEFT VENTRICLE DIASTOLIC VOLUME (MOD BIPLANE): 116 ML
LEFT VENTRICLE SYSTOLIC VOLUME (MOD BIPLANE): 66 ML
LEFT VENTRICULAR INTERNAL DIMENSION IN DIASTOLE: 4.7 CM (ref 3.5–6)
LEFT VENTRICULAR POSTERIOR WALL IN END DIASTOLE: 1.1 CM
LEFT VENTRICULAR STROKE VOLUME: 41 ML
LV EF: 43 %
LVSV (TEICH): 41 ML
LYMPHOCYTES # BLD AUTO: 1.6 THOUSANDS/ÂΜL (ref 0.6–4.47)
LYMPHOCYTES NFR BLD AUTO: 11 % (ref 14–44)
MAGNESIUM SERPL-MCNC: 1.9 MG/DL (ref 1.6–2.6)
MCH RBC QN AUTO: 32.5 PG (ref 26.8–34.3)
MCHC RBC AUTO-ENTMCNC: 31.8 G/DL (ref 31.4–37.4)
MCV RBC AUTO: 102 FL (ref 82–98)
MONOCYTES # BLD AUTO: 0.68 THOUSAND/ÂΜL (ref 0.17–1.22)
MONOCYTES NFR BLD AUTO: 5 % (ref 4–12)
NEUTROPHILS # BLD AUTO: 12.57 THOUSANDS/ÂΜL (ref 1.85–7.62)
NEUTS SEG NFR BLD AUTO: 83 % (ref 43–75)
NRBC BLD AUTO-RTO: 0 /100 WBCS
PHOSPHATE SERPL-MCNC: 2.6 MG/DL (ref 2.7–4.5)
PLATELET # BLD AUTO: 339 THOUSANDS/UL (ref 149–390)
PMV BLD AUTO: 10.8 FL (ref 8.9–12.7)
POTASSIUM SERPL-SCNC: 3.1 MMOL/L (ref 3.5–5.3)
RA PRESSURE ESTIMATED: 3 MMHG
RBC # BLD AUTO: 3.75 MILLION/UL (ref 3.81–5.12)
RIGHT VENTRICLE ID DIMENSION: 3.9 CM
RV PSP: 16 MMHG
SL CV LV EF: 43
SL CV PED ECHO LEFT VENTRICLE DIASTOLIC VOLUME (MOD BIPLANE) 2D: 103 ML
SL CV PED ECHO LEFT VENTRICLE SYSTOLIC VOLUME (MOD BIPLANE) 2D: 62 ML
SODIUM SERPL-SCNC: 148 MMOL/L (ref 135–147)
TR MAX PG: 13 MMHG
TR PEAK VELOCITY: 1.8 M/S
TRICUSPID VALVE PEAK REGURGITATION VELOCITY: 1.83 M/S
WBC # BLD AUTO: 15.05 THOUSAND/UL (ref 4.31–10.16)

## 2022-10-02 RX ORDER — POTASSIUM CHLORIDE 14.9 MG/ML
20 INJECTION INTRAVENOUS 2 TIMES DAILY
Status: COMPLETED | OUTPATIENT
Start: 2022-10-02 | End: 2022-10-03

## 2022-10-02 RX ORDER — METOPROLOL TARTRATE 5 MG/5ML
5 INJECTION INTRAVENOUS EVERY 8 HOURS
Status: DISCONTINUED | OUTPATIENT
Start: 2022-10-02 | End: 2022-10-04

## 2022-10-02 RX ORDER — MICONAZOLE NITRATE 20 MG/G
CREAM TOPICAL 2 TIMES DAILY
Status: DISCONTINUED | OUTPATIENT
Start: 2022-10-02 | End: 2022-11-07 | Stop reason: HOSPADM

## 2022-10-02 RX ORDER — POTASSIUM CHLORIDE 20 MEQ/1
40 TABLET, EXTENDED RELEASE ORAL ONCE
Status: DISCONTINUED | OUTPATIENT
Start: 2022-10-02 | End: 2022-10-02

## 2022-10-02 RX ORDER — POTASSIUM CHLORIDE 20MEQ/15ML
40 LIQUID (ML) ORAL ONCE
Status: COMPLETED | OUTPATIENT
Start: 2022-10-02 | End: 2022-10-02

## 2022-10-02 RX ADMIN — METOROPROLOL TARTRATE 5 MG: 5 INJECTION, SOLUTION INTRAVENOUS at 23:24

## 2022-10-02 RX ADMIN — HEPARIN SODIUM 5000 UNITS: 5000 INJECTION INTRAVENOUS; SUBCUTANEOUS at 23:12

## 2022-10-02 RX ADMIN — OLANZAPINE 5 MG: 5 TABLET, ORALLY DISINTEGRATING ORAL at 23:11

## 2022-10-02 RX ADMIN — MICONAZOLE NITRATE: 20 CREAM TOPICAL at 12:21

## 2022-10-02 RX ADMIN — HEPARIN SODIUM 5000 UNITS: 5000 INJECTION INTRAVENOUS; SUBCUTANEOUS at 07:09

## 2022-10-02 RX ADMIN — BUMETANIDE 2 MG: 0.25 INJECTION INTRAMUSCULAR; INTRAVENOUS at 09:38

## 2022-10-02 RX ADMIN — QUETIAPINE FUMARATE 50 MG: 25 TABLET ORAL at 09:37

## 2022-10-02 RX ADMIN — POTASSIUM CHLORIDE 20 MEQ: 14.9 INJECTION, SOLUTION INTRAVENOUS at 00:53

## 2022-10-02 RX ADMIN — POTASSIUM CHLORIDE 40 MEQ: 20 SOLUTION ORAL at 09:36

## 2022-10-02 RX ADMIN — POTASSIUM CHLORIDE 20 MEQ: 14.9 INJECTION, SOLUTION INTRAVENOUS at 23:12

## 2022-10-02 RX ADMIN — HEPARIN SODIUM 5000 UNITS: 5000 INJECTION INTRAVENOUS; SUBCUTANEOUS at 13:43

## 2022-10-02 RX ADMIN — METOROPROLOL TARTRATE 5 MG: 5 INJECTION, SOLUTION INTRAVENOUS at 12:22

## 2022-10-02 RX ADMIN — GABAPENTIN 100 MG: 250 SUSPENSION ORAL at 23:12

## 2022-10-02 RX ADMIN — GABAPENTIN 100 MG: 250 SUSPENSION ORAL at 17:03

## 2022-10-02 RX ADMIN — Medication 1 PATCH: at 09:38

## 2022-10-02 RX ADMIN — AMLODIPINE BESYLATE 10 MG: 10 TABLET ORAL at 09:37

## 2022-10-02 RX ADMIN — POTASSIUM CHLORIDE 20 MEQ: 14.9 INJECTION, SOLUTION INTRAVENOUS at 12:23

## 2022-10-02 RX ADMIN — QUETIAPINE FUMARATE 50 MG: 25 TABLET ORAL at 17:03

## 2022-10-02 RX ADMIN — MICONAZOLE NITRATE: 20 CREAM TOPICAL at 17:04

## 2022-10-02 RX ADMIN — CEFAZOLIN SODIUM 1000 MG: 1 SOLUTION INTRAVENOUS at 18:29

## 2022-10-02 RX ADMIN — METOROPROLOL TARTRATE 2.5 MG: 5 INJECTION, SOLUTION INTRAVENOUS at 03:36

## 2022-10-02 RX ADMIN — GABAPENTIN 100 MG: 250 SUSPENSION ORAL at 09:37

## 2022-10-02 NOTE — ASSESSMENT & PLAN NOTE
· Secondary to rhabdomyolysis  No evidence of liver injury on CT imaging  · Seen by GI  Hepatic duplex also negative      Results from last 7 days   Lab Units 10/01/22  0507 10/01/22  0145 09/30/22  0137   AST U/L 56* 66* 68*   ALT U/L 47 45 62   TOTAL BILIRUBIN mg/dL 0 36 0 44 0 40

## 2022-10-02 NOTE — ASSESSMENT & PLAN NOTE
· Was on venlafaxine and quetiapine- has been refusing these medications since admission, part of this due to lethargy, part of it due to NPO status   ·   · Does have history of suicide attempt a currently does not show any signs of thoughts of self-harm  · Previously on Effexor, which patient has been refusing either spitting out medication or chewing  · Psychiatry consultation placed and patient case reviewed with on-call psychiatry    · Continue Seroquel 50 mg twice a day   · Please give Zyprexa 5 mg by mouth every 6 hours for any worsening agitation

## 2022-10-02 NOTE — ASSESSMENT & PLAN NOTE
· Secondary to fall, patient had been laying in bed for approximately 4 days and family members had thought she was taking a deep nap  · She was subsequently brought to the hospital and found to be in acute rhabdomyolysis    · Had been on multiple sedating medications including MS Contin as well as gabapentin, which likely were contributing to sedation  · Was started on aggressive IV hydration, still with acute kidney injury but improving  · CK levels down trending  ·     Results from last 7 days   Lab Units 09/29/22  0513 09/27/22  0557 09/26/22  0454   CK TOTAL U/L 624* 848* 990*

## 2022-10-02 NOTE — PROGRESS NOTES
[address] [facility]  Progress Note - [facility] Setting 1970, 45 y o  female MRN: [id number]  Unit/Bed#: [place] [facility]  -01 Encounter: [id number]  Primary Care Provider: Devika Bowser MD   Date and time admitted to hospital: 9/19/2022  8:04 AM    * Encephalopathy acute  Assessment & Plan  · Acute encephalopathy secondary to gabapentin and morphine with subsequent renal failure, potentially component of psychiatric illness, restarted back on Psych medications  · Increased Seroquel 50 mg every 12 hours  · Likely a component of delirium as well given prolonged hospital stay  · Received 5 days of thiamine high-dose given potential concern for Wernicke's encephalopathy  · Holding morphine since admission due to over-sedation  · LP and MRI negative for acute pathology  · Found to be positive for methicillin sensitive Staph aureus- consideration for Toxic metabolic encephalopathy  · Restarted gabapentin - given concern for withdrawal  · Mental status improving, no focal neurologic deficit  · Previously evaluated by Neurology at the beginning of the hospitalization, if prolonged would recommend Re consultation      Hypernatremia  Assessment & Plan  Increase free water flushes with tube feeds  Recheck in a m      Hypokalemia  Assessment & Plan  Continue repletion, likely component of diuretic induced hypokalemia    Recent Labs     10/01/22  0145 10/01/22  0507 10/02/22  0505   K 3 1* 3 2* 3 1*           Aspiration pneumonitis (HCC)  Assessment & Plan  Patient with significant wheezing as well as new right lower lobe opacity seen on imaging study  Likely aspiration pneumonia/pneumonitis in the setting of encephalopathy  Remains on tube feeding due to waxing and waning mental status, approved for dysphagia diet today, but will keep tube feeding until patient is more awake      Pulmonary edema  Assessment & Plan  Patient with flash pulmonary edema overnight in the setting of aggressive IV fluid as well as renal dysfunction  Continue Bumex 2 mg twice a day  Evidence of bilateral pleural effusion  CT of the chest ordered  Echocardiogram with preserved ejection fraction  High risk situation    Bacteremia due to methicillin susceptible Staphylococcus aureus (MSSA)  Assessment & Plan  Patient with methicillin sensitive Staph aureus bacteremia  Echo with no vegetation  Will place CHERYLE referral per ID recommendations- unfortunately developed respiratory distress this morning and will need to be optimized prior to re-attempt  Potentially may have been present on admission as the patient has multiple wounds on her legs from previous fall 4 days ago  In differential diagnosis includes septic thrombophlebitis  Continue Ancef, started on prophylaxis Vancomycin to prevent C diff infection  Infectious Disease consultation reviewed  Localized swelling of right upper extremity  Assessment & Plan  Evidence of streaking of the right upper extremity, although difficult to determine given ecchymosis  Blood cultures positive for methicillin sensitive Staph aureus    Leg edema, right  Assessment & Plan  · Likely source of rhabdomyolysis  No evidence of compartment syndrome fracture or DVT on imaging  · 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  · Resolved after IV diuretics  ·       D-dimer, elevated  Assessment & Plan  · Elevated D-dimer may be related to fall  · Lower extremity duplex negative for DVT  · Renal dysfunction cannot check CTA of chest but doubt PE  · Discontinued heparin infusion and transitioned to prophylaxis dose    Traumatic rhabdomyolysis [facility])  Assessment & Plan  · Secondary to fall, patient had been laying in bed for approximately 4 days and family members had thought she was taking a deep nap  · She was subsequently brought to the hospital and found to be in acute rhabdomyolysis    · Had been on multiple sedating medications including MS Contin as well as gabapentin, which likely were contributing to sedation  · Was started on aggressive IV hydration, still with acute kidney injury but improving  · CK levels down trending  ·     Results from last 7 days   Lab Units 09/29/22  0513 09/27/22  0557 09/26/22  0454   CK TOTAL U/L 624* 848* 990*       Abnormal CT of the chest  Assessment & Plan  · Left-sided opacities noted  COVID negative  Transaminitis  Assessment & Plan  · Secondary to rhabdomyolysis  No evidence of liver injury on CT imaging  · Seen by GI  Hepatic duplex also negative  Results from last 7 days   Lab Units 10/01/22  0507 10/01/22  0145 09/30/22  0137   AST U/L 56* 66* 68*   ALT U/L 47 45 62   TOTAL BILIRUBIN mg/dL 0 36 0 44 0 40       ARF (acute renal failure) (HCC)  Assessment & Plan  · ANUJA/ATN secondary to rhabdomyolysis  No evidence of renal obstruction on imaging  · Appreciate nephrology evaluation  Awaiting renal recovery  · No emergent need for hemodialysis, consent obtained from family members  · Renal function improving    Results from last 7 days   Lab Units 10/02/22  0505 10/01/22  0507 10/01/22  0145 09/30/22  0137 09/29/22  0513 09/28/22  0542 09/27/22  0557 09/26/22  0454   BUN mg/dL 36* 39* 41* 40* 41* 48* 50* 53*   CREATININE mg/dL 4 79* 5 78* 5 88* 6 37* 6 45* 6 94* 7 08* 7 63*   EGFR ml/min/1 73sq m 9 7 7 6 6 6 6 5       DDD (degenerative disc disease), lumbosacral  Assessment & Plan  · Lumbar radiculopathy due for surgery  · Prior to admission was on gabapentin and morphine IR 15 mg   · Confirmed on PDMP, discontinued, is not asking for additional pain meds  · Gabapentin resumed at lower dose        Tobacco abuse  Assessment & Plan  · Nicotine patch ordered    Depression  Assessment & Plan  · Was on venlafaxine and quetiapine- has been refusing these medications since admission, part of this due to lethargy, part of it due to NPO status   ·   · Does have history of suicide attempt a currently does not show any signs of thoughts of self-harm  · Previously on Effexor, which patient has been refusing either spitting out medication or chewing  · Psychiatry consultation placed and patient case reviewed with on-call psychiatry  · Continue Seroquel 50 mg twice a day   · Please give Zyprexa 5 mg by mouth every 6 hours for any worsening agitation      [facility] Internal Medicine Progress Note  Patient: Alejandro Benavides 45 y o  female   MRN: [id number]  PCP: Meron Marroquin MD  Unit/Bed#: [place] [id number] -01 Encounter: [id number]  Date Of Visit: 10/02/22    Assessment:    Principal Problem:    Encephalopathy acute  Active Problems:    Depression    Tobacco abuse    DDD (degenerative disc disease), lumbosacral    ARF (acute renal failure) (HCC)    Transaminitis    Abnormal CT of the chest    Traumatic rhabdomyolysis (HCC)    D-dimer, elevated    Leg edema, right    Localized swelling of right upper extremity    Bacteremia due to methicillin susceptible Staphylococcus aureus (MSSA)    Pulmonary edema    Aspiration pneumonitis (HCC)    Hypokalemia    Hypernatremia      Plan:    · Continue antibiotic  · Wean oxygen  · Start diet with tube feeding  · Recheck labs in a m   · Monitor for renal recovery  · Will add banana flakes to patient's tube feeding given to feed associated loose stools       VTE Pharmacologic Prophylaxis:   Pharmacologic: Heparin  Mechanical VTE Prophylaxis in Place: Yes    Patient Centered Rounds: I have performed bedside rounds with nursing staff today  Discussions with Specialists or Other Care Team Provider:  Case management    Education and Discussions with Family / Patient:  Patient     Time Spent for Care: 45 minutes  More than 50% of total time spent on counseling and coordination of care as described above      Current Length of Stay: 13 day(s)    Current Patient Status: Inpatient   Certification Statement: The patient will continue to require additional inpatient hospital stay due to Encephalopathy, tube feeding, bacteremia    Discharge Plan / Estimated Discharge Date:  Greater than 72 hours    Code Status: Level 1 - Full Code      Subjective:   Seen examined, no acute complaints  Tolerating oral diet  No nausea vomiting noted  Did have loose stools with tube feeding  Answers questions today, alert to person and place although does have waxing and waning of her mental status        A complete and comprehensive 14 point organ system review has been performed and all other systems are negative other than stated above  Objective:     Vitals:   Temp (24hrs), Av 7 °F (37 1 °C), Min:98 1 °F (36 7 °C), Max:99 3 °F (37 4 °C)    Temp:  [98 1 °F (36 7 °C)-99 3 °F (37 4 °C)] 98 1 °F (36 7 °C)  HR:  [100-117] 100  Resp:  [14-20] 14  BP: (140-143)/(86-87) 140/86  SpO2:  [96 %-99 %] 97 %  Body mass index is 22 44 kg/m²  Input and Output Summary (last 24 hours):        Intake/Output Summary (Last 24 hours) at 10/2/2022 1808  Last data filed at 10/2/2022 1000  Gross per 24 hour   Intake 100 ml   Output --   Net 100 ml       Physical Exam:     General:  Appears ill cachectic  HEENT: atraumatic, PERRLA, moist mucosa, normal pharynx, normal tonsils and adenoids, normal tongue, no fluid in sinuses  Neck: Trachea midline, no carotid bruit, no masses  Respiratory: normal chest wall expansion, CTA B, no r/r/w, no rubs  Cardiovascular: RRR, no m/r/g, Normal S1 and S2  Abdomen: Soft, non-tender, non-distended, normal bowel sounds in all quadrants, no hepatosplenomegaly, no tympany  Rectal: deferred  Musculoskeletal:  Moves all  Integumentary: warm, dry, and pink, with no rash, purpura, or petechia  Heme/Lymph: no lymphadenopathy, no bruises  Neurological: Cranial Nerves II-XII grossly intact  Psychiatric:  Confused    Additional Data:     Labs:    Results from last 7 days   Lab Units 10/02/22  0505   WBC Thousand/uL 15 05*   HEMOGLOBIN g/dL 12 2   HEMATOCRIT % 38 4   PLATELETS Thousands/uL 339   NEUTROS PCT % 83*   LYMPHS PCT % 11* MONOS PCT % 5   EOS PCT % 0     Results from last 7 days   Lab Units 10/02/22  0505 10/01/22  0507   POTASSIUM mmol/L 3 1* 3 2*   CHLORIDE mmol/L 102 102   CO2 mmol/L 38* 33*   BUN mg/dL 36* 39*   CREATININE mg/dL 4 79* 5 78*   CALCIUM mg/dL 9 8 9 0   ALK PHOS U/L  --  76   ALT U/L  --  47   AST U/L  --  56*           * I Have Reviewed All Lab Data Listed Above  * Additional Pertinent Lab Tests Reviewed: [place] 64 Admission Reviewed    Imaging:    Imaging Reports Reviewed Today Include:  No new imaging  Imaging Personally Reviewed by Myself Includes:  No new imaging    Recent Cultures (last 7 days):     Results from last 7 days   Lab Units 09/28/22  1145 09/28/22  1144 09/26/22  1818   BLOOD CULTURE  No Growth After 4 Days  No Growth After 4 Days   Staphylococcus aureus*  Staphylococcus aureus*   GRAM STAIN RESULT   --   --  Gram positive cocci in clusters*  Gram positive cocci in clusters*       Last 24 Hours Medication List:   Current Facility-Administered Medications   Medication Dose Route Frequency Provider Last Rate   • acetaminophen  650 mg Oral Q6H PRN Luís Garrido, DO     • ALPRAZolam  0 5 mg Per NG Tube HS PRN CARLITOS Reyes     • amLODIPine  10 mg Oral Daily Sam Richardson MD     • cefazolin  1,000 mg Intravenous Q24H Álvaro Knox PA-C 1,000 mg (10/01/22 2341)   • gabapentin  100 mg Per NG Tube TID Karla Person, DO     • heparin (porcine)  5,000 Units Subcutaneous [facility] Atul Adan, DO     • hydrALAZINE  5 mg Intravenous Q6H PRN Karla Person DO     • HYDROmorphone  0 5 mg Intravenous Q4H PRN Luís Garrido DO     • levalbuterol  1 25 mg Nebulization Q4H PRN Karla Person DO     • levalbuterol  1 25 mg Nebulization Once Karla Person DO     • metoprolol  5 mg Intravenous Q8H Sam Mcdaniel DO     • VANDANA ANTIFUNGAL   Topical BID Karla Person DO     • nicotine  1 patch Transdermal Daily Atul Adan DO     • OLANZapine  5 mg Oral Q6H PRN Christiano Valencia,      • ondansetron  4 mg Intravenous Q4H PRN Honey Apple,      • potassium chloride  20 mEq Intravenous BID CARLITOS Logan 20 mEq (10/02/22 1223)   • QUEtiapine  50 mg Per NG Tube BID Sam Gleason DO          Today, Patient Was Seen By: Christiano Valencia    ** Please Note: This note was completed in part utilizing [employer] Direct Software  Grammatical errors, random word insertions, spelling mistakes, and incomplete sentences may be an occasional consequence of this system secondary to software limitations, ambient noise, and hardware issues  If you have any questions or concerns about the content, text, or information contained within the body of this dictation, please contact the provider for clarification   **

## 2022-10-02 NOTE — SPEECH THERAPY NOTE
Speech Language/Pathology    Speech/Language Pathology Progress Note    Patient Name: Caryle Harsh  Today's Date: 10/2/2022     Problem List  Principal Problem:    Encephalopathy acute  Active Problems:    Depression    Tobacco abuse    DDD (degenerative disc disease), lumbosacral    ARF (acute renal failure) (HCC)    Transaminitis    Abnormal CT of the chest    Traumatic rhabdomyolysis (HCC)    D-dimer, elevated    Leg edema, right    Localized swelling of right upper extremity    Bacteremia due to methicillin susceptible Staphylococcus aureus (MSSA)    Pulmonary edema    Aspiration pneumonitis (HCC)    Hypokalemia    Hypernatremia       Past Medical History  Past Medical History:   Diagnosis Date   • Chronic pain disorder    • Depression    • GERD (gastroesophageal reflux disease)    • History of electroconvulsive therapy    • Low back pain    • Self-injurious behavior    • Suicide attempt [facility])         Past Surgical History  Past Surgical History:   Procedure Laterality Date   •  SECTION     • COLONOSCOPY     • PANCREAS SURGERY      "pseudocysts" per patient's  Ricki Infante   • PA ESOPHAGOGASTRODUODENOSCOPY TRANSORAL DIAGNOSTIC N/A 4/10/2018    Procedure: EGD AND COLONOSCOPY;  Surgeon: Gerardo Wells MD;  [place] [facility]; Service: Gastroenterology         Subjective:  Pt seen for dysphagia tx  Pt more alert today, eyes open throughout session, limited verbalizations  Objective:  Reviewed chart, discussed with RN  Pt has dobhoff for feeding, due to recent lethargy and poor PO intake  Sats were in the upper 90s on 3L of O2 during exam  Noted in chart that pt recently pulled out her dobhoff with suspected aspiration of TF  Pt now in soft wrist restraints  Oral care was completed prior to PO trials  Mucosa was somewhat dry; pt appears to be at least a partial mouth breather  Pt was trialed with ice chips, water by teaspoon and straw, and applesauce   Pt drank a total of 8 oz thin water by straw, and ate 4 oz cup of applesauce  Draw from straw and bolus retrieval from spoon was adequate  Bolus manipulation and transfer of the applesauce was mild-moderately prolonged, with suspected extraneous lingual movements  Transfer of thin liquids appeared prompt  Pharyngeal swallows were observed and palpated  Swallows appeared prompt, with no overt clinical s/s of aspiration  Pt was somewhat impulsive when drinking liquids; straw was removed from mouth after small sips to prevent gulping  Discussed recommendations with pt, RN, and sent TT to physician  Aspiration precautions hung in room  Assessment:  Pt more alert and willing to eat/drink today  Prolonged but functional oral phase for pureed food  There were no overt s/s of aspiration  Plan/Recommendations:  Consider a pureed diet and thin liquids  Aspiration precautions, feed only when fully alert, small bites/sips, feed slowly  Recommend keeping dobhoff in for now, since DES has been waxing and waning  Continue with dysphagia tx

## 2022-10-02 NOTE — ASSESSMENT & PLAN NOTE
Patient with flash pulmonary edema overnight in the setting of aggressive IV fluid as well as renal dysfunction  Continue Bumex 2 mg twice a day  Evidence of bilateral pleural effusion  CT of the chest ordered  Echocardiogram with preserved ejection fraction  High risk situation

## 2022-10-02 NOTE — ASSESSMENT & PLAN NOTE
Continue repletion, likely component of diuretic induced hypokalemia    Recent Labs     10/01/22  0145 10/01/22  0507 10/02/22  0505   K 3 1* 3 2* 3 1*

## 2022-10-02 NOTE — NURSING NOTE
* Pt returned to confused state by morning    Pt seems more clear as of about 0330  Was able to tell nurse her 1st & last name  Did release soft restraints for a few minutes and pt followed commands

## 2022-10-02 NOTE — ASSESSMENT & PLAN NOTE
Patient with significant wheezing as well as new right lower lobe opacity seen on imaging study  Likely aspiration pneumonia/pneumonitis in the setting of encephalopathy  Remains on tube feeding due to waxing and waning mental status, approved for dysphagia diet today, but will keep tube feeding until patient is more awake

## 2022-10-02 NOTE — PLAN OF CARE
Problem: PAIN - ADULT  Goal: Verbalizes/displays adequate comfort level or baseline comfort level  Description: Interventions:  - Encourage patient to monitor pain and request assistance  - Assess pain using appropriate pain scale  - Administer analgesics based on type and severity of pain and evaluate response  - Implement non-pharmacological measures as appropriate and evaluate response  - Consider cultural and social influences on pain and pain management  - Notify physician/advanced practitioner if interventions unsuccessful or patient reports new pain  Outcome: Progressing     Problem: INFECTION - ADULT  Goal: Absence or prevention of progression during hospitalization  Description: INTERVENTIONS:  - Assess and monitor for signs and symptoms of infection  - Monitor lab/diagnostic results  - Monitor all insertion sites, i e  indwelling lines, tubes, and drains  - Monitor endotracheal if appropriate and nasal secretions for changes in amount and color  - Coin appropriate cooling/warming therapies per order  - Administer medications as ordered  - Instruct and encourage patient and family to use good hand hygiene technique  - Identify and instruct in appropriate isolation precautions for identified infection/condition  Outcome: Progressing     Problem: SAFETY ADULT  Goal: Patient will remain free of falls  Description: INTERVENTIONS:  - Educate patient/family on patient safety including physical limitations  - Instruct patient to call for assistance with activity   - Consult OT/PT to assist with strengthening/mobility   - Keep Call bell within reach  - Keep bed low and locked with side rails adjusted as appropriate  - Keep care items and personal belongings within reach  - Initiate and maintain comfort rounds  - Make Fall Risk Sign visible to staff  - Offer Toileting every 2 Hours, in advance of need  - Initiate/Maintain bed alarm  - Obtain necessary fall risk management equipment: bed rails  - Apply yellow socks and bracelet for high fall risk patients  - Consider moving patient to room near nurses station  Outcome: Progressing  Goal: Maintain or return to baseline ADL function  Description: INTERVENTIONS:  -  Assess patient's ability to carry out ADLs; assess patient's baseline for ADL function and identify physical deficits which impact ability to perform ADLs (bathing, care of mouth/teeth, toileting, grooming, dressing, etc )  - Assess/evaluate cause of self-care deficits   - Assess range of motion  - Assess patient's mobility; develop plan if impaired  - Assess patient's need for assistive devices and provide as appropriate  - Encourage maximum independence but intervene and supervise when necessary  - Involve family in performance of ADLs  - Assess for home care needs following discharge   - Consider OT consult to assist with ADL evaluation and planning for discharge  - Provide patient education as appropriate  Outcome: Progressing  Goal: Maintains/Returns to pre admission functional level  Description: INTERVENTIONS:  - Perform BMAT or MOVE assessment daily    - Set and communicate daily mobility goal to care team and patient/family/caregiver  - Collaborate with rehabilitation services on mobility goals if consulted  - Perform Range of Motion 2 times a day  - Reposition patient every 2 hours    - Record patient progress and toleration of activity level   Outcome: Progressing     Problem: DISCHARGE PLANNING  Goal: Discharge to home or other facility with appropriate resources  Description: INTERVENTIONS:  - Identify barriers to discharge w/patient and caregiver  - Arrange for needed discharge resources and transportation as appropriate  - Identify discharge learning needs (meds, wound care, etc )  - Arrange for interpretive services to assist at discharge as needed  - Refer to Case Management Department for coordinating discharge planning if the patient needs post-hospital services based on physician/advanced practitioner order or complex needs related to functional status, cognitive ability, or social support system  Outcome: Progressing     Problem: Knowledge Deficit  Goal: Patient/family/caregiver demonstrates understanding of disease process, treatment plan, medications, and discharge instructions  Description: Complete learning assessment and assess knowledge base    Interventions:  - Provide teaching at level of understanding  - Provide teaching via preferred learning methods  Outcome: Progressing     Problem: Potential for Falls  Goal: Patient will remain free of falls  Description: INTERVENTIONS:  - Educate patient/family on patient safety including physical limitations  - Instruct patient to call for assistance with activity   - Consult OT/PT to assist with strengthening/mobility   - Keep Call bell within reach  - Keep bed low and locked with side rails adjusted as appropriate  - Keep care items and personal belongings within reach  - Initiate and maintain comfort rounds  - Make Fall Risk Sign visible to staff  - Offer Toileting every 2 Hours, in advance of need  - Initiate/Maintain bed alarm  - Obtain necessary fall risk management equipment: bed rails  - Apply yellow socks and bracelet for high fall risk patients  - Consider moving patient to room near nurses station  Outcome: Progressing     Problem: MOBILITY - ADULT  Goal: Maintain or return to baseline ADL function  Description: INTERVENTIONS:  -  Assess patient's ability to carry out ADLs; assess patient's baseline for ADL function and identify physical deficits which impact ability to perform ADLs (bathing, care of mouth/teeth, toileting, grooming, dressing, etc )  - Assess/evaluate cause of self-care deficits   - Assess range of motion  - Assess patient's mobility; develop plan if impaired  - Assess patient's need for assistive devices and provide as appropriate  - Encourage maximum independence but intervene and supervise when necessary  - Involve family in performance of ADLs  - Assess for home care needs following discharge   - Consider OT consult to assist with ADL evaluation and planning for discharge  - Provide patient education as appropriate  Outcome: Progressing  Goal: Maintains/Returns to pre admission functional level  Description: INTERVENTIONS:  - Perform BMAT or MOVE assessment daily    - Set and communicate daily mobility goal to care team and patient/family/caregiver  - Collaborate with rehabilitation services on mobility goals if consulted  - Perform Range of Motion 2 times a day  - Reposition patient every 2 hours  - Record patient progress and toleration of activity level   Outcome: Progressing     Problem: Prexisting or High Potential for Compromised Skin Integrity  Goal: Skin integrity is maintained or improved  Description: INTERVENTIONS:  - Identify patients at risk for skin breakdown  - Assess and monitor skin integrity  - Assess and monitor nutrition and hydration status  - Monitor labs   - Assess for incontinence   - Turn and reposition patient  - Assist with mobility/ambulation  - Relieve pressure over bony prominences  - Avoid friction and shearing  - Provide appropriate hygiene as needed including keeping skin clean and dry  - Evaluate need for skin moisturizer/barrier cream  - Collaborate with interdisciplinary team   - Patient/family teaching  - Consider wound care consult   Outcome: Progressing     Problem: Nutrition/Hydration-ADULT  Goal: Nutrient/Hydration intake appropriate for improving, restoring or maintaining nutritional needs  Description: Monitor and assess patient's nutrition/hydration status for malnutrition  Collaborate with interdisciplinary team and initiate plan and interventions as ordered  Monitor patient's weight and dietary intake as ordered or per policy  Utilize nutrition screening tool and intervene as necessary  Determine patient's food preferences and provide high-protein, high-caloric foods as appropriate  INTERVENTIONS:  - Monitor oral intake, urinary output, labs, and treatment plans  - Assess nutrition and hydration status and recommend course of action  - Evaluate amount of meals eaten  - Assist patient with eating if necessary   - Allow adequate time for meals  - Recommend/ encourage appropriate diets, oral nutritional supplements, and vitamin/mineral supplements  - Order, calculate, and assess calorie counts as needed  - Recommend, monitor, and adjust tube feedings and TPN/PPN based on assessed needs  - Assess need for intravenous fluids  - Provide specific nutrition/hydration education as appropriate  - Include patient/family/caregiver in decisions related to nutrition  Outcome: Progressing     Problem: NEUROSENSORY - ADULT  Goal: Achieves maximal functionality and self care  Description: INTERVENTIONS  - Monitor swallowing and airway patency with patient fatigue and changes in neurological status  - Encourage and assist patient to increase activity and self care     - Encourage visually impaired, hearing impaired and aphasic patients to use assistive/communication devices  Outcome: Progressing     Problem: CARDIOVASCULAR - ADULT  Goal: Maintains optimal cardiac output and hemodynamic stability  Description: INTERVENTIONS:  - Monitor I/O, vital signs and rhythm  - Monitor for S/S and trends of decreased cardiac output  - Administer and titrate ordered vasoactive medications to optimize hemodynamic stability  - Assess quality of pulses, skin color and temperature  - Assess for signs of decreased coronary artery perfusion  - Instruct patient to report change in severity of symptoms  Outcome: Progressing     Problem: RESPIRATORY - ADULT  Goal: Achieves optimal ventilation and oxygenation  Description: INTERVENTIONS:  - Assess for changes in respiratory status  - Assess for changes in mentation and behavior  - Position to facilitate oxygenation and minimize respiratory effort  - Oxygen administered by appropriate delivery if ordered  - Initiate smoking cessation education as indicated  - Encourage broncho-pulmonary hygiene including cough, deep breathe, Incentive Spirometry  - Assess the need for suctioning and aspirate as needed  - Assess and instruct to report SOB or any respiratory difficulty  - Respiratory Therapy support as indicated  Outcome: Progressing     Problem: GASTROINTESTINAL - ADULT  Goal: Maintains adequate nutritional intake  Description: INTERVENTIONS:  - Monitor percentage of each meal consumed  - Identify factors contributing to decreased intake, treat as appropriate  - Assist with meals as needed  - Monitor I&O, weight, and lab values if indicated  - Obtain nutrition services referral as needed  Outcome: Progressing     Problem: METABOLIC, FLUID AND ELECTROLYTES - ADULT  Goal: Electrolytes maintained within normal limits  Description: INTERVENTIONS:  - Monitor labs and assess patient for signs and symptoms of electrolyte imbalances  - Administer electrolyte replacement as ordered  - Monitor response to electrolyte replacements, including repeat lab results as appropriate  - Instruct patient on fluid and nutrition as appropriate  Outcome: Progressing  Goal: Fluid balance maintained  Description: INTERVENTIONS:  - Monitor labs   - Monitor I/O and WT  - Instruct patient on fluid and nutrition as appropriate  - Assess for signs & symptoms of volume excess or deficit  Outcome: Progressing     Problem: SKIN/TISSUE INTEGRITY - ADULT  Goal: Skin Integrity remains intact(Skin Breakdown Prevention)  Description: Assess:  -Perform Erickson assessment every shift  -Clean and moisturize skin as needed  -Inspect skin when repositioning, toileting, and assisting with ADLS  -Assess under medical devices such as masimo every 4 hrs  -Assess extremities for adequate circulation and sensation     Bed Management:  -Have minimal linens on bed & keep smooth, unwrinkled  -Change linens as needed when moist or perspiring  -Avoid sitting or lying in one position for more than 2 hours while in bed  -Keep HOB at >30 degrees     Toileting:  -Offer bedside commode  -Assess for incontinence every 2 hrs  -Use incontinent care products after each incontinent episode     Activity:  -Encourage activity   -Encourage or provide ROM exercises   -Turn and reposition patient every 2 Hours  -Use appropriate equipment to lift or move patient in bed  -Instruct/ Assist with weight shifting every 15 min when out of bed in chair   -Consider limitation of chair time to 2 hour intervals    Skin Care:  -Avoid use of baby powder, tape, friction and shearing, hot water or constrictive clothing  -Relieve pressure over bony prominences using allevyn foams  -Do not massage red bony areas    Next Steps:  -Teach patient strategies to minimize risks   -Consider consults to  interdisciplinary teams   Outcome: Progressing  Goal: Incision(s), wounds(s) or drain site(s) healing without S/S of infection  Description: INTERVENTIONS  - Assess and document dressing, incision, wound bed, drain sites and surrounding tissue  - Provide patient and family education  - Perform skin care/dressing changes as needed or per order   Outcome: Progressing     Problem: MUSCULOSKELETAL - ADULT  Goal: Maintain or return mobility to safest level of function  Description: INTERVENTIONS:  - Assess patient's ability to carry out ADLs; assess patient's baseline for ADL function and identify physical deficits which impact ability to perform ADLs (bathing, care of mouth/teeth, toileting, grooming, dressing, etc )  - Assess/evaluate cause of self-care deficits   - Assess range of motion  - Assess patient's mobility  - Assess patient's need for assistive devices and provide as appropriate  - Encourage maximum independence but intervene and supervise when necessary  - Involve family in performance of ADLs  - Assess for home care needs following discharge   - Consider OT consult to assist with ADL evaluation and planning for discharge  - Provide patient education as appropriate  Outcome: Progressing

## 2022-10-02 NOTE — PROGRESS NOTES
Progress Note - Cardiology   Jojo Sloan 45 y o  female MRN: [id number]  Unit/Bed#: [place] [address] -[id number] Encounter: [id number]      Assessment/Recommendations/Discussion:   1  MSSA bacteremia 2/2 blood cultures positive  2  Acute respiratory failure, acute diastolic heart failure due to volume overload  3  Toxic metabolic encephalopathy  4  Acute renal failure presumed due to ATN and severe rhabdomyolysis  5  Rhabdomyolysis  6  Depression      PLAN  • Still with intermittent confusion - more interactive this morning however does not give significant review of systems  • Renal function improving  • Would replace potassium  • CT of the chest yesterday showed multifocal opacities concerning for multifocal pneumonia and/or pulmonary edema with new small moderate bilateral pleural effusions  • Na up today however, hold on further diuretics  • Will check new limited echo given the development of the bilateral pleural effusions, with prior echo showing preserved EF, seems that this is out of proportion to the LV function on prior echo, re-evaluate LV and RV function, eval valves given MSSA bacteremia  • She is still too confused and compromised from respiratory standpoint to perform transesophageal echo    Subjective:   HPI  Confused but more interactive today, still gives no ROS      Review of Systems: As noted in HPI  Rest of ROS is negative      Vitals:   /87   Pulse (!) 109   Temp 99 3 °F (37 4 °C)   Resp 15   Ht 5' 6" (1 676 m)   Wt 63 5 kg (139 lb 15 9 oz)   LMP 03/14/2019 (Approximate)   SpO2 96%   BMI 22 60 kg/m²   I/O       09/30 0701  10/01 0700 10/01 0701  10/02 0700 10/02 0701  10/03 0700    I V  (mL/kg)       NG/GT  452     IV Piggyback 100      Total Intake(mL/kg) 100 (1 6) 452 (7 1)     Net +100 +452            Unmeasured Urine Occurrence 4 x 3 x     Unmeasured Stool Occurrence 1 x          Weight (last 2 days)     Date/Time Weight    10/02/22 0600 63 5 (139 99)    10/01/22 0600 63 5 (139 99) 09/30/22 0600 67 (147 71)          Physical Exam   Constitutional: awake, confused/encephalopathic  Head: Normocephalic, without obvious abnormality, atraumatic  Eyes: conjunctivae clear and moist  Sclera anicteric  No xanthelasmas  Pupils equal bilaterally  Extraocular motions are full  Ear nose mouth and throat: ears are symmetrical bilaterally, hearing appears to be equal bilaterally, no nasal discharge or epistaxis, oropharynx is clear with moist mucous membranes  Neck:  Trachea is midline, neck is supple, no thyromegaly or significant lymphadenopathy, there is full range of motion    Lungs: clear to auscultation bilaterally, no wheezes, no rales, no rhonchi, no accessory muscle use, breathing is nonlabored  Heart: regular rate and rhythm, S1, S2 normal, no murmur, no click, rub or gallop, no lower extremity edema  Abdomen: soft, non-tender; bowel sounds normal; no masses,  no organomegaly  Skin: LE wounds noted      TELEMETRY:   Lab Results:  Results from last 7 days   Lab Units 10/02/22  0505   WBC Thousand/uL 15 05*   HEMOGLOBIN g/dL 12 2   HEMATOCRIT % 38 4   PLATELETS Thousands/uL 339     Results from last 7 days   Lab Units 10/02/22  0505 10/01/22  0507   POTASSIUM mmol/L 3 1* 3 2*   CHLORIDE mmol/L 102 102   CO2 mmol/L 38* 33*   BUN mg/dL 36* 39*   CREATININE mg/dL 4 79* 5 78*   CALCIUM mg/dL 9 8 9 0   ALK PHOS U/L  --  76   ALT U/L  --  47   AST U/L  --  56*     Results from last 7 days   Lab Units 10/02/22  0505   POTASSIUM mmol/L 3 1*   CHLORIDE mmol/L 102   CO2 mmol/L 38*   BUN mg/dL 36*   CREATININE mg/dL 4 79*   CALCIUM mg/dL 9 8           Medications:    Current Facility-Administered Medications:   •  acetaminophen (TYLENOL) tablet 650 mg, 650 mg, Oral, Q6H PRN, Atul Adan DO, 650 mg at 09/22/22 1040  •  ALPRAZolam (XANAX) tablet 0 5 mg, 0 5 mg, Per NG Tube, HS PRN, Aleena Jennings, CRNP  •  amLODIPine (NORVASC) tablet 10 mg, 10 mg, Oral, Daily, Del Fairchild MD, 10 mg at 10/02/22 0937  •  bumetanide (BUMEX) injection 2 mg, 2 mg, Intravenous, BID, Sam Leong DO, 2 mg at 10/02/22 4894  •  ceFAZolin (ANCEF) IVPB (premix in dextrose) 1,000 mg 50 mL, 1,000 mg, Intravenous, Q24H, Álvaro Knox PA-C, Last Rate: 100 mL/hr at 10/01/22 2341, 1,000 mg at 10/01/22 2341  •  gabapentin (NEURONTIN) oral solution 100 mg, 100 mg, Per NG Tube, TID, Madhu Conti, DO, 100 mg at 10/02/22 4584  •  heparin (porcine) subcutaneous injection 5,000 Units, 5,000 Units, Subcutaneous, Q8H [facility], Ronald Bynum DO, 5,000 Units at 10/02/22 0709  •  hydrALAZINE (APRESOLINE) injection 5 mg, 5 mg, Intravenous, Q6H PRN, Madhu Conti DO, 5 mg at 10/01/22 1236  •  HYDROmorphone (DILAUDID) injection 0 5 mg, 0 5 mg, Intravenous, Q4H PRN, Ronald Bynum, DO, 0 5 mg at 10/01/22 0031  •  levalbuterol (Sabiha James) inhalation solution 1 25 mg, 1 25 mg, Nebulization, Q4H PRN, Madhu Conti DO  •  levalbuterol [facility]) inhalation solution 1 25 mg, 1 25 mg, Nebulization, Once, Sam Leong DO  •  metoprolol (LOPRESSOR) injection 5 mg, 5 mg, Intravenous, Q8H, Sam Leong DO  •  nicotine (NICODERM CQ) 14 mg/24hr TD 24 hr patch 1 patch, 1 patch, Transdermal, Daily, Atul Adan DO, 1 patch at 10/02/22 4587  •  OLANZapine (ZyPREXA ZYDIS) dispersible tablet 5 mg, 5 mg, Oral, Q6H PRN, Madhu Conti, DO, 5 mg at 09/30/22 2218  •  ondansetron (ZOFRAN) injection 4 mg, 4 mg, Intravenous, Q4H PRN, Ronald Bynum DO  •  QUEtiapine (SEROquel) tablet 50 mg, 50 mg, Per NG Tube, BID, Madhu Conti DO, 50 mg at 10/02/22 5066    This note was completed in part utilizing [employer] Direct Software  Grammatical errors, random word insertions, spelling mistakes, and incomplete sentences may be an occasional consequence of this system secondary to software limitations, ambient noise, and hardware issues    If you have any questions or concerns about the content, text, or information contained within the body of this dictation, please contact the provider for clarification      Jeremi Brownlee DO, [facility]  10/2/2022 9:58 AM

## 2022-10-02 NOTE — ASSESSMENT & PLAN NOTE
· Lumbar radiculopathy due for surgery  · Prior to admission was on gabapentin and morphine IR 15 mg   · Confirmed on PDMP, discontinued, is not asking for additional pain meds  · Gabapentin resumed at lower dose

## 2022-10-02 NOTE — PROGRESS NOTES
Progress Note - Nephrology   Jojo Sloan 45 y o  female MRN: [id number]  Unit/Bed#: [place] [address] 87 217-01 Encounter: [id number]    64-year-old female with history of depression, GERD, tobacco abuse, who presents with change in mental status   Per outpatient record, patient suffered a fall on Friday   She became increasingly lethargic and sedentary over the weekend with decreased oral intake and worsening right leg pain   Nephrology consulted for acute kidney injury      ASSESSMENT and PLAN:  Acute kidney injury (POA):  Etiology: Suspect ATN in the setting of severe rhabdomyolysis, prerenal component and now with MSSA bacteremia  Assessment:  · After review of medical records through [address] [place] [address] it appears that the patient has a baseline Creatinine of 0 7-1 0  · Admission creatinine 5 8,  · Peak creatinine 7 6  · Current creatinine continues to improve slowly  4 79  · No urgent need for dialysis; if hemodialysis is required consent form is on chart  · Unable to obtain accurate I&O for patient is incontinent; has pulled out Rivera catheters unable to use purwick  · Status post IV Bumex in the setting of pulmonary edema/fusion  Plan:  · Avoid nephrotoxins, adjust meds to appropriate GFR  · Optimize hemodynamic status to avoid delay in renal recovery  · Continue to monitor closely for dialysis need  · Continue with tube feedings with Jevity and free water flushes at 100 mL every 6 hours  · Now with increased sodium, examining volume depleted-will discontinue IV Bumex  · May need to increase free water flushes  · Monitor for now  · Check BMP in a m      Severe rhabdomyolysis:  Status post fall  Assessment and plan  · Peak CK level 32,630  · Has improved and currently 624  · Continue to trend     Hypokalemia  Assessment and Plan:  · Potassium 3 1 this a m  likely secondary to IV diuretic use  · Status post 40 mEq via NG tube today  · Will treat with IV 20 mEq x2  · Check BMP in a m  treat as needed  · Home medications include:     Right lower extremity edema  Assessment and Plan:  · Resolved  · Status post duplex ultrasound-did not reveal DVT  · Orthopedic consulted and no need for MRI     Hypoxic respiratory failure  Assessment and Plan:  · Chest x-ray concern for pulmonary edema versus pneumonia  · -decreased oxygen need currently on 3 L nasal cannula  · Bumex IV b i d  added-last dose this a m  · Will continue to trend  · Patient now examining volume depleted   · Will stopped IV Bumex-discussed with primary team and Cardiology     Encephalopathy  Assessment and Plan:  Etiology:   multifactorial  · Management per primary team  · Avoid gabapentin and morphine  · Less likely uremic encephalopathy with slowly improving azotemia  · Patient more awake but confused and not following commands     MSSA bacteremia:  Likely secondary to thrombo phlebitis  Assessment and plan  · Infectious disease following  · Remains on IV antibiotics  · Status post LP and MR      Review of systems  Patient seen and examined at bedside:  Patient more awake today but unable to answer questions appropriately or follow commands  Review of Systems   Reason unable to perform ROS: Encephalopathy  Physical exam:  Current Weight: Weight - Scale: 63 5 kg (139 lb 15 9 oz)  Vitals:    10/02/22 0004 10/02/22 0338 10/02/22 0600 10/02/22 0815   BP:  143/87  143/87   BP [place]       Pulse: (!) 109 (!) 111  (!) 109   Resp:    15   Temp:    99 3 °F (37 4 °C)   TempSrc:       SpO2: 98% 98%  96%   Weight:   63 5 kg (139 lb 15 9 oz)    Height:           Intake/Output Summary (Last 24 hours) at 10/2/2022 1135  Last data filed at 10/2/2022 1000  Gross per 24 hour   Intake 200 ml   Output --   Net 200 ml       Physical Exam  Vitals and nursing note reviewed  Constitutional:       Appearance: She is ill-appearing  Comments: In acute distress   HENT:      Head: Normocephalic and atraumatic        Nose: Nose normal       Mouth/Throat:      Mouth: Mucous membranes are dry  Pharynx: Oropharynx is clear  Comments: Dry lips and oral cavity  Eyes:      Extraocular Movements: Extraocular movements intact  Conjunctiva/sclera: Conjunctivae normal    Cardiovascular:      Rate and Rhythm: Regular rhythm  Tachycardia present  Pulses: Normal pulses  Heart sounds: Normal heart sounds  Pulmonary:      Effort: Pulmonary effort is normal       Breath sounds: Normal breath sounds  Comments: Diminished throughout  Abdominal:      General: Bowel sounds are normal       Palpations: Abdomen is soft  Musculoskeletal:         General: Normal range of motion  Cervical back: Normal range of motion and neck supple  Skin:     General: Skin is warm and dry  Neurological:      General: No focal deficit present  Mental Status: She is alert  She is disoriented     Psychiatric:         Mood and Affect: Mood normal          Behavior: Behavior normal             Medications:    Current Facility-Administered Medications:   •  acetaminophen (TYLENOL) tablet 650 mg, 650 mg, Oral, Q6H PRN, Atul Adan DO, 650 mg at 09/22/22 1040  •  ALPRAZolam (XANAX) tablet 0 5 mg, 0 5 mg, Per NG Tube, HS PRN, CARLITOS Gallego  •  amLODIPine (NORVASC) tablet 10 mg, 10 mg, Oral, Daily, Aurora Goodwin MD, 10 mg at 10/02/22 1436  •  ceFAZolin (ANCEF) IVPB (premix in dextrose) 1,000 mg 50 mL, 1,000 mg, Intravenous, Q24H, Álvaro Knox PA-C, Last Rate: 100 mL/hr at 10/01/22 2341, 1,000 mg at 10/01/22 2341  •  gabapentin (NEURONTIN) oral solution 100 mg, 100 mg, Per NG Tube, TID, Madhu Conti DO, 100 mg at 10/02/22 9493  •  heparin (porcine) subcutaneous injection 5,000 Units, 5,000 Units, Subcutaneous, Q8H [facility], Atul Adan DO, 5,000 Units at 10/02/22 0709  •  hydrALAZINE (APRESOLINE) injection 5 mg, 5 mg, Intravenous, Q6H PRN, Madhu Conti DO, 5 mg at 10/01/22 1236  •  HYDROmorphone (DILAUDID) injection 0 5 mg, 0 5 mg, Intravenous, Q4H PRN, Harris Reece DO, 0 5 mg at 10/01/22 0031  •  levalbuterol (XOPENEX) inhalation solution 1 25 mg, 1 25 mg, Nebulization, Q4H PRN, Vicente Tarango DO  •  levalbuterol [facility]) inhalation solution 1 25 mg, 1 25 mg, Nebulization, Once, Sam Steiner DO  •  metoprolol (LOPRESSOR) injection 5 mg, 5 mg, Intravenous, Q8H, Sam Steiner DO  •  moisture barrier miconazole 2% cream (aka VANDANA MOISTURE BARRIER ANTIFUNGAL CREAM), , Topical, BID, Sam Steiner DO  •  nicotine (NICODERM CQ) 14 mg/24hr TD 24 hr patch 1 patch, 1 patch, Transdermal, Daily, Atul Adan DO, 1 patch at 10/02/22 9795  •  OLANZapine (ZyPREXA ZYDIS) dispersible tablet 5 mg, 5 mg, Oral, Q6H PRN, Vicente Tarango DO, 5 mg at 09/30/22 2218  •  ondansetron (ZOFRAN) injection 4 mg, 4 mg, Intravenous, Q4H PRN, Harris Reece DO  •  QUEtiapine (SEROquel) tablet 50 mg, 50 mg, Per NG Tube, BID, Vicente Tarango DO, 50 mg at 10/02/22 2917    Laboratory Results:  Results from last 7 days   Lab Units 10/02/22  0505 10/01/22  0507 10/01/22  0145 09/30/22  0137 09/29/22  0513 09/28/22  0542 09/27/22  0557   WBC Thousand/uL 15 05* 18 54* 19 44* 14 78* 9 27 8 58 11 66*   HEMOGLOBIN g/dL 12 2 9 7* 11 0* 9 8* 9 7* 11 5 12 2   HEMATOCRIT % 38 4 31 2* 34 4* 31 0* 30 4* 36 2 37 6   PLATELETS Thousands/uL 339 325 336 280 282 293 308   SODIUM mmol/L 148* 144 146 145 145 144 140   POTASSIUM mmol/L 3 1* 3 2* 3 1* 3 8 3 2* 3 3* 4 0   CHLORIDE mmol/L 102 102 104 104 103 103 100   CO2 mmol/L 38* 33* 34* 30 30 29 24   BUN mg/dL 36* 39* 41* 40* 41* 48* 50*   CREATININE mg/dL 4 79* 5 78* 5 88* 6 37* 6 45* 6 94* 7 08*   CALCIUM mg/dL 9 8 9 0 9 3 9 1 8 9 8 9 8 8   MAGNESIUM mg/dL 1 9 2 1 1 7  --  1 8  --   --    PHOSPHORUS mg/dL 2 6* 5 3* 5 4*  --   --   --   --

## 2022-10-02 NOTE — ASSESSMENT & PLAN NOTE
· Acute encephalopathy secondary to gabapentin and morphine with subsequent renal failure, potentially component of psychiatric illness, restarted back on Psych medications  · Increased Seroquel 50 mg every 12 hours  · Likely a component of delirium as well given prolonged hospital stay  · Received 5 days of thiamine high-dose given potential concern for Wernicke's encephalopathy  · Holding morphine since admission due to over-sedation  · LP and MRI negative for acute pathology  · Found to be positive for methicillin sensitive Staph aureus- consideration for Toxic metabolic encephalopathy  · Restarted gabapentin - given concern for withdrawal  · Mental status improving, no focal neurologic deficit  · Previously evaluated by Neurology at the beginning of the hospitalization, if prolonged would recommend Re consultation

## 2022-10-02 NOTE — ASSESSMENT & PLAN NOTE
· ANUJA/ATN secondary to rhabdomyolysis  No evidence of renal obstruction on imaging  · Appreciate nephrology evaluation  Awaiting renal recovery      · No emergent need for hemodialysis, consent obtained from family members  · Renal function improving    Results from last 7 days   Lab Units 10/02/22  0505 10/01/22  0507 10/01/22  0145 09/30/22  0137 09/29/22  0513 09/28/22  0542 09/27/22  0557 09/26/22  0454   BUN mg/dL 36* 39* 41* 40* 41* 48* 50* 53*   CREATININE mg/dL 4 79* 5 78* 5 88* 6 37* 6 45* 6 94* 7 08* 7 63*   EGFR ml/min/1 73sq m 9 7 7 6 6 6 6 5

## 2022-10-03 PROBLEM — R19.7 DIARRHEA: Status: ACTIVE | Noted: 2022-10-03

## 2022-10-03 LAB
ANION GAP SERPL CALCULATED.3IONS-SCNC: 12 MMOL/L (ref 4–13)
BACTERIA BLD CULT: NORMAL
BACTERIA BLD CULT: NORMAL
BASOPHILS # BLD AUTO: 0.12 THOUSANDS/ÂΜL (ref 0–0.1)
BASOPHILS NFR BLD AUTO: 1 % (ref 0–1)
BUN SERPL-MCNC: 34 MG/DL (ref 5–25)
CALCIUM SERPL-MCNC: 9.7 MG/DL (ref 8.3–10.1)
CHLORIDE SERPL-SCNC: 100 MMOL/L (ref 96–108)
CO2 SERPL-SCNC: 35 MMOL/L (ref 21–32)
CREAT SERPL-MCNC: 3.73 MG/DL (ref 0.6–1.3)
EOSINOPHIL # BLD AUTO: 0.12 THOUSAND/ÂΜL (ref 0–0.61)
EOSINOPHIL NFR BLD AUTO: 1 % (ref 0–6)
ERYTHROCYTE [DISTWIDTH] IN BLOOD BY AUTOMATED COUNT: 14.2 % (ref 11.6–15.1)
GFR SERPL CREATININE-BSD FRML MDRD: 13 ML/MIN/1.73SQ M
GLUCOSE SERPL-MCNC: 175 MG/DL (ref 65–140)
HCT VFR BLD AUTO: 38.8 % (ref 34.8–46.1)
HGB BLD-MCNC: 12.2 G/DL (ref 11.5–15.4)
IMM GRANULOCYTES # BLD AUTO: 0.17 THOUSAND/UL (ref 0–0.2)
IMM GRANULOCYTES NFR BLD AUTO: 1 % (ref 0–2)
LYMPHOCYTES # BLD AUTO: 2.51 THOUSANDS/ÂΜL (ref 0.6–4.47)
LYMPHOCYTES NFR BLD AUTO: 12 % (ref 14–44)
MAGNESIUM SERPL-MCNC: 1.7 MG/DL (ref 1.6–2.6)
MCH RBC QN AUTO: 32.1 PG (ref 26.8–34.3)
MCHC RBC AUTO-ENTMCNC: 31.4 G/DL (ref 31.4–37.4)
MCV RBC AUTO: 102 FL (ref 82–98)
MONOCYTES # BLD AUTO: 0.93 THOUSAND/ÂΜL (ref 0.17–1.22)
MONOCYTES NFR BLD AUTO: 5 % (ref 4–12)
NEUTROPHILS # BLD AUTO: 16.47 THOUSANDS/ÂΜL (ref 1.85–7.62)
NEUTS SEG NFR BLD AUTO: 80 % (ref 43–75)
NRBC BLD AUTO-RTO: 0 /100 WBCS
PHOSPHATE SERPL-MCNC: 2.6 MG/DL (ref 2.7–4.5)
PLATELET # BLD AUTO: 341 THOUSANDS/UL (ref 149–390)
PMV BLD AUTO: 11.1 FL (ref 8.9–12.7)
POTASSIUM SERPL-SCNC: 3.6 MMOL/L (ref 3.5–5.3)
RBC # BLD AUTO: 3.8 MILLION/UL (ref 3.81–5.12)
SODIUM SERPL-SCNC: 147 MMOL/L (ref 135–147)
WBC # BLD AUTO: 20.32 THOUSAND/UL (ref 4.31–10.16)

## 2022-10-03 RX ORDER — CEFAZOLIN SODIUM 2 G/50ML
2000 SOLUTION INTRAVENOUS EVERY 12 HOURS
Status: DISCONTINUED | OUTPATIENT
Start: 2022-10-03 | End: 2022-10-05

## 2022-10-03 RX ORDER — CEFAZOLIN SODIUM 1 G/50ML
1000 SOLUTION INTRAVENOUS EVERY 12 HOURS
Status: DISCONTINUED | OUTPATIENT
Start: 2022-10-03 | End: 2022-10-03

## 2022-10-03 RX ADMIN — VANCOMYCIN HYDROCHLORIDE 125 MG: 500 INJECTION, POWDER, LYOPHILIZED, FOR SOLUTION INTRAVENOUS at 17:00

## 2022-10-03 RX ADMIN — HEPARIN SODIUM 5000 UNITS: 5000 INJECTION INTRAVENOUS; SUBCUTANEOUS at 22:00

## 2022-10-03 RX ADMIN — HEPARIN SODIUM 5000 UNITS: 5000 INJECTION INTRAVENOUS; SUBCUTANEOUS at 13:05

## 2022-10-03 RX ADMIN — METOROPROLOL TARTRATE 5 MG: 5 INJECTION, SOLUTION INTRAVENOUS at 13:04

## 2022-10-03 RX ADMIN — MICONAZOLE NITRATE: 20 CREAM TOPICAL at 17:34

## 2022-10-03 RX ADMIN — VANCOMYCIN HYDROCHLORIDE 125 MG: 500 INJECTION, POWDER, LYOPHILIZED, FOR SOLUTION INTRAVENOUS at 22:00

## 2022-10-03 RX ADMIN — QUETIAPINE FUMARATE 50 MG: 25 TABLET ORAL at 09:03

## 2022-10-03 RX ADMIN — Medication 1 PATCH: at 09:11

## 2022-10-03 RX ADMIN — GABAPENTIN 100 MG: 250 SUSPENSION ORAL at 22:00

## 2022-10-03 RX ADMIN — CEFAZOLIN SODIUM 2000 MG: 2 SOLUTION INTRAVENOUS at 13:00

## 2022-10-03 RX ADMIN — AMLODIPINE BESYLATE 10 MG: 10 TABLET ORAL at 09:03

## 2022-10-03 RX ADMIN — GABAPENTIN 100 MG: 250 SUSPENSION ORAL at 17:00

## 2022-10-03 RX ADMIN — METOROPROLOL TARTRATE 5 MG: 5 INJECTION, SOLUTION INTRAVENOUS at 06:27

## 2022-10-03 RX ADMIN — MICONAZOLE NITRATE: 20 CREAM TOPICAL at 09:04

## 2022-10-03 RX ADMIN — METOROPROLOL TARTRATE 5 MG: 5 INJECTION, SOLUTION INTRAVENOUS at 21:58

## 2022-10-03 RX ADMIN — GABAPENTIN 100 MG: 250 SUSPENSION ORAL at 09:10

## 2022-10-03 RX ADMIN — QUETIAPINE FUMARATE 50 MG: 25 TABLET ORAL at 17:34

## 2022-10-03 RX ADMIN — HEPARIN SODIUM 5000 UNITS: 5000 INJECTION INTRAVENOUS; SUBCUTANEOUS at 06:27

## 2022-10-03 NOTE — SPEECH THERAPY NOTE
Speech Language/Pathology    Speech/Language Pathology Progress Note    Patient Name: Bentley Stout  Today's Date: 10/3/2022     Problem List  Principal Problem:    Encephalopathy acute  Active Problems:    Depression    Tobacco abuse    DDD (degenerative disc disease), lumbosacral    ARF (acute renal failure) (HCC)    Transaminitis    Abnormal CT of the chest    Traumatic rhabdomyolysis (HCC)    D-dimer, elevated    Leg edema, right    Localized swelling of right upper extremity    Bacteremia due to methicillin susceptible Staphylococcus aureus (MSSA)    Pulmonary edema    Aspiration pneumonitis (HCC)    Hypokalemia    Hypernatremia       Past Medical History  Past Medical History:   Diagnosis Date   • Chronic pain disorder    • Depression    • GERD (gastroesophageal reflux disease)    • History of electroconvulsive therapy    • Low back pain    • Self-injurious behavior    • Suicide attempt [facility])         Past Surgical History  Past Surgical History:   Procedure Laterality Date   •  SECTION     • COLONOSCOPY     • PANCREAS SURGERY      "pseudocysts" per patient's  Ricki Infante   • [place] ESOPHAGOGASTRODUODENOSCOPY TRANSORAL DIAGNOSTIC N/A 4/10/2018    Procedure: EGD AND COLONOSCOPY;  Surgeon: Karrie Madden MD;  [place] [facility]; Service: Gastroenterology         Subjective:  Pt seen for dysphagia tx  Pt was attempting to get out of bed, confused  With assistance from PCA, pt was repositioned upright in bed  Pt is alert, speaks when spoken to, able to state her name  Objective:  Pt pulled out her dobhoff tube again  Unsure if it will be replaced  She is in BL soft wrist restraints  Mouth appears dry  Pt drank 4 oz juice via straw  She was impulsive, attempting to take large consecutive sips, and did not respond to cues to take smaller sips, therefore straw was removed from her mouth intermittently to ensure smaller sips   She was fed a portion of her breakfast, including pureed eggs and sausage, pudding, and 2 oz Ensure by straw  Bolus manipulation and transfer, especially with the pureed food was prolonged, but functional  Pharyngeal swallows/hyolaryngeal elevation was observed, and appeared fairly timely  There were no overt s/s of aspiration  After eating a few bites of food, pt began to gag and declined further PO  Sat were in the low 90s on RA  Pt had a chest CT on 10/1:  Persistent multifocal pulmonary opacities with new consolidative opacity in right lower lobe, suspicious for combination of multifocal pneumonia and pulmonary edema  New small-to-moderate bilateral pleural effusions (right worse than left), worsened bilateral lower lobe subsegmental atelectasis (right worse than left), and new mild-to-moderate body wall edema  If respiratory status declines, or CT/CXR does not improve, may want to consider VBS to assess for silent aspiration  Discussed with RN  Assessment:  Pt appears to be tolerating pureed diet/thin liquids, but with reduced PO/gagging after a few bites  No s/s of aspiration  Plan/Recommendations:  Continue pureed diet and thin liquids  Consider VBS to further assess the swallow if respiratory status declines or if chest does not clear up/worsen  Continue with dysphagia tx

## 2022-10-03 NOTE — PROGRESS NOTES
Follow up Consultation    Nephrology   Rody Lamas 45 y o  female MRN: [id number]  Unit/Bed#: [place] [address] [address] [id number] Encounter: [id number]      Physician Requesting Consult: Rachel Doty MD        ASSESSMENT/PLAN:  [de-identified] year female multiple comorbidities including GERD, depression, substance abuse admitted with altered mental status  Nephrology following for acute kidney injury  Acute kidney injury (POA):  ANUJA multifactorial most likely secondary to severe rhabdomyolysis plus some component prerenal azotemia plus likely some component of infectious GN due to MSSA bacteremia  After review of records In [facility] as well as Care everywhere it appears that the patient has a baseline Creatinine of 0 8-1 1 mg/dL  patient was admitted with a creatinine of 5 88 mg/dL on 09/19/2022  Peak creatinine this hospitalization at 7 67 mg/dL on 09/25/2022  patient's creatinine today is at 3 73 mg/dL, stable and improving  No acute indication for dialysis at this time  Dialysis consent was obtained previously per by my colleague and is in the chart if needed  Overall patient does not appear to be good candidate for dialysis since she has been pulling out tubes  I am not sure if she will be able to even hold still for dialysis and allow for the dialysis catheter to stay in  In light of improving renal function will hold off on dialysis for now  If renal function continue to deteriorate we may need to rediscuss with spouse further management  Clinically appears to be hypovolemic to euvolemic hold off further IV fluids and diuretics at this time   check BMP in a m  Await renal recovery  Optimize hemodynamic status to avoid delay in renal recovery  Place on a renal diet when allowed diet order  Avoid nephrotoxins, adjust meds to appropriate GFR  Strict I/O    Daily weights  Urinary retention protocol if patient does not have a Rivera  will need to set up patient for follow up with Nephrology as an outpatient post hospitalization  Blood pressure/hypertension:  current medications:  Norvasc 10 mg p o  Q day, metoprolol 5 mg IV q 8  recommendations:  Last dose of Bumex 2 mg on 10/02/2022 if patient with worsening shortness of breath may give additional dosage  Optimize hemodynamics  Maintain MAP > 65mmHg  Avoid BP fluctuations  H/H/anemia:  most recent hemoglobin at 12 2 grams/deciliter  maintain hemoglobin greater than 8 grams/deciliter    Acid-base electrolytes:    Electrolytes:    Stable  Hypernatremia:   Most recent sodium at 147 mEq  Encourage free water intake for now    Hypokalemia:  Most recent potassium at 3 6 mEq stable and improved    Hyperphosphatemia:  Most recent phosphorus down to 2 6 improving    Acid-base:    Most recent bicarb at 35    Other medical problems:  Proteinuria: Most recent UA from 09/19/2022 with trace protein  MSSA bacteremia:  Management per primary team   On Ancef  CT chest concerning for multifocal opacities concerning for multifocal pneumonia new small moderate bilateral pleural effusions  Altered mental status/encephalopathy:  Appears to be multifactorial secondary to underlying psych illness plus gabapentin plus morphine usage plus some contribution from renal dysfunction  Overall improving  Management per primary team      Thanks for the consult  Will continue to follow  Please call with questions/ concerns  Above-mentioned orders and Plan in terms of acute kidney injury was discussed with the team in depth in-person    [address], FASN, 10/3/2022, 10:08 AM              Objective :   Patient seen and examined in her room T-max a 100 1° urine output not adequately documented remains pleasantly confused however as per nursing staff she is much more communicate of as compared to prior only oriented to person  Remains in restraints        PHYSICAL EXAM  /92 (BP [place] Right arm)   Pulse 98   Temp 98 3 °F (36 8 °C) (Oral)   Resp 17   Ht 5' 6" (1 676 m)   Wt 63 kg (138 lb 14 2 oz)   LMP 2019 (Approximate)   SpO2 93%   BMI 22 42 kg/m²   Temp (24hrs), Av 8 °F (37 1 °C), Min:98 1 °F (36 7 °C), Max:100 1 °F (37 8 °C)      No intake or output data in the 24 hours ending 10/03/22 1008    I/O last 24 hours: In: 100 [NG/GT:100]  Out: -       Current Weight: Weight - Scale: 63 kg (138 lb 14 2 oz)  First Weight: Weight - Scale: 62 6 kg (138 lb 0 1 oz)  Physical Exam  Vitals and nursing note reviewed  Constitutional:       General: She is not in acute distress  Appearance: Normal appearance  She is normal weight  She is ill-appearing  She is not toxic-appearing or diaphoretic  HENT:      Head: Normocephalic and atraumatic  Mouth/Throat:      Mouth: Mucous membranes are moist       Pharynx: Oropharynx is clear  No oropharyngeal exudate  Eyes:      General: No scleral icterus  Conjunctiva/sclera: Conjunctivae normal    Cardiovascular:      Rate and Rhythm: Normal rate  Heart sounds: Normal heart sounds  No friction rub  Pulmonary:      Effort: Pulmonary effort is normal  No respiratory distress  Breath sounds: Normal breath sounds  No stridor  No wheezing  Comments: Slight decreased breath sound bases  Abdominal:      General: There is no distension  Palpations: Abdomen is soft  There is no mass  Tenderness: There is no abdominal tenderness  There is no right CVA tenderness or left CVA tenderness  Musculoskeletal:         General: No swelling  Cervical back: Normal range of motion and neck supple  No rigidity  Comments: Remains in restraints   Skin:     General: Skin is warm  Coloration: Skin is not jaundiced  Neurological:      General: No focal deficit present  Mental Status: She is alert  She is disoriented  Comments:  Only oriented to person   Psychiatric:         Mood and Affect: Mood normal              Review of Systems   Unable to perform ROS: Mental status change   Constitutional: Positive for fever  Negative for fatigue  HENT: Negative for congestion  Respiratory: Negative for cough, shortness of breath and wheezing  Cardiovascular: Negative for leg swelling  Gastrointestinal: Negative for vomiting  Genitourinary: Negative for dysuria  Musculoskeletal: Negative for back pain  Neurological: Negative for headaches  Psychiatric/Behavioral: Positive for confusion  Negative for agitation  All other systems reviewed and are negative  Scheduled Meds:  Current Facility-Administered Medications   Medication Dose Route Frequency Provider Last Rate   • acetaminophen  650 mg Oral Q6H PRN Jesus Giles DO     • ALPRAZolam  0 5 mg Per NG Tube HS PRN CARLITOS Chapman     • amLODIPine  10 mg Oral Daily Kusum Art MD     • cefazolin  1,000 mg Intravenous Q24H Álvaro Knox PA-C 1,000 mg (10/02/22 1829)   • gabapentin  100 mg Per NG Tube TID aVlentina Ward DO     • heparin (porcine)  5,000 Units Subcutaneous [facility] Atul Adan, DO     • hydrALAZINE  5 mg Intravenous Q6H PRN Valentina Ward DO     • HYDROmorphone  0 5 mg Intravenous Q4H PRN Jesus Giles DO     • levalbuterol  1 25 mg Nebulization Q4H PRN Valentina Ward DO     • levalbuterol  1 25 mg Nebulization Once Sam Ford DO     • metoprolol  5 mg Intravenous Q8H Sam Ford DO     • VANDANA ANTIFUNGAL   Topical BID Valentina Ward DO     • nicotine  1 patch Transdermal Daily Atul Adan, DO     • OLANZapine  5 mg Oral Q6H PRN Valentina Ward DO     • ondansetron  4 mg Intravenous Q4H PRN Jesus Giles DO     • QUEtiapine  50 mg Per NG Tube BID Valentina Ward DO         PRN Meds: •  acetaminophen  •  ALPRAZolam  •  hydrALAZINE  •  HYDROmorphone  •  levalbuterol  •  OLANZapine  •  ondansetron    Continuous Infusions:       Invasive Devices:      Invasive Devices  Report    Peripheral Intravenous Line  Duration           Peripheral IV 09/30/22 Proximal;Right;Ventral (anterior) Forearm 2 days          Drain  Duration           NG/OG/Enteral Tube Enteral Feeding Tube Right nare 2 days                  LABORATORY:    Results from last 7 days   Lab Units 10/03/22  0913 10/03/22  0507 10/02/22  0505 10/01/22  0507 10/01/22  0145 09/30/22  0137 09/29/22  0513 09/28/22  0542   WBC Thousand/uL  --  20 32* 15 05* 18 54* 19 44* 14 78* 9 27 8 58   HEMOGLOBIN g/dL  --  12 2 12 2 9 7* 11 0* 9 8* 9 7* 11 5   HEMATOCRIT %  --  38 8 38 4 31 2* 34 4* 31 0* 30 4* 36 2   PLATELETS Thousands/uL  --  341 339 325 336 280 282 293   POTASSIUM mmol/L 3 6  --  3 1* 3 2* 3 1* 3 8 3 2* 3 3*   CHLORIDE mmol/L 100  --  102 102 104 104 103 103   CO2 mmol/L 35*  --  38* 33* 34* 30 30 29   BUN mg/dL 34*  --  36* 39* 41* 40* 41* 48*   CREATININE mg/dL 3 73*  --  4 79* 5 78* 5 88* 6 37* 6 45* 6 94*   CALCIUM mg/dL 9 7  --  9 8 9 0 9 3 9 1 8 9 8 9   MAGNESIUM mg/dL  --  1 7 1 9 2 1 1 7  --  1 8  --    PHOSPHORUS mg/dL  --  2 6* 2 6* 5 3* 5 4*  --   --   --       rest all reviewed    RADIOLOGY:  CT chest wo contrast   Final Result by Joe Newman MD (10/01 5484)      Persistent multifocal pulmonary opacities with new consolidative opacity in right lower lobe, suspicious for combination of multifocal pneumonia and pulmonary edema  New small-to-moderate bilateral pleural effusions (right worse than left), worsened bilateral lower lobe subsegmental atelectasis (right worse than left), and new mild-to-moderate body wall edema  The study was marked in [facility] for immediate notification  Workstation performed: [id number]         XR chest portable   Final Result by Noreen Palomo MD (10/01 1233)      Persistent extensive patchy bilateral interstitial alveolar consolidation likely slightly increased  Small right pleural effusion  Feeding tube                    Workstation performed: [id number]         XR abdomen 1 view kub   Final Result by Amber Coronel MD (10/01 1100)      Feeding tube tip within the proximal stomach  Workstation performed: [id number]         XR abdomen 1 view kub   Final Result by Umair Montenegro MD (10/01 1100)      Feeding tube tip within the proximal stomach  Workstation performed: [id number]         XR chest portable   Final Result by Lise Domínguez MD (09/30 1916)         1  Feeding tube mid to distal esophagus  Patient has a subsequent follow-up abdominal image  2   Bilateral pulmonary opacities consistent with pneumonia or pulmonary edema unchanged since comparison  Workstation performed: [id number]         XR abdomen 1 view kub   Final Result by Lise Domínguez MD (09/30 1918)      Advancement of feeding tube into the stomach with no acute findings  Workstation performed: [id number]         XR chest portable   Final Result by Martha Gonsalez MD (81/28 9402)      There is new bilateral central airspace opacities, left greater than right, which could be pulmonary edema or pneumonia  Workstation performed: [id number]         VAS lower limb venous duplex study, unilateral/limited   Final Result by Katie Oro MD (09/25 2224)      MRI brain wo contrast   Final Result by [facility],  (09/23 1510)      No evidence of recent infarct  No mass effect or midline shift  Study performed in the limited fashion with extensive motion artifact  Workstation performed: [id number]         US right upper quadrant with liver dopplers   Final Result by Rhoda Elaine MD (09/20 1326)      1  Minimal layering sludge without evidence of acute cholecystitis  2   Normal-appearing liver with normal liver Dopplers        Workstation performed: [id number]         VAS lower limb venous duplex study, unilateral/limited   Final Result by Katie Oro MD (09/19 2204)      CT lower extremity wo contrast right   Final Result by Martha Gonsalez MD (09/19 1535)      There is no evidence of intramuscular hematoma or other mass lesion in the right calf  Please note that rhabdomyolysis and compartment syndrome are clinical diagnoses, and are not excluded based on these imaging findings  Workstation performed: [id number]         CT chest abdomen pelvis wo contrast   Final Result by Jessie Love MD (09/19 1301)      Bilateral groundglass opacities with superimposed inter and intralobular septal thickening  Differential diagnosis includes pulmonary hemorrhage, infection, and pulmonary edema, although the distribution is atypical for edema  The study was marked in [facility] for immediate notification  Workstation performed: [id number]         XR ankle 3+ views RIGHT   Final Result by John Zavala MD (09/19 1009)      No acute osseous abnormality  Workstation performed: [id number]         XR foot 3+ views RIGHT   Final Result by John Zavala MD (09/19 1009)      No acute osseous abnormality  Workstation performed: [id number]         XR hip/pelv 2-3 vws right   Final Result by John Zavala MD (09/19 1007)      No acute osseous abnormality  Workstation performed: [id number]         XR chest 1 view   Final Result by John Zavala MD (09/19 1008)      No acute cardiopulmonary disease  Findings are stable            Workstation performed: [id number]         CT head without contrast   Final Result by Manuel Holguin MD (09/19 3754)      No acute intracranial process  No skull fracture  Workstation performed: [id number]         CT spine cervical without contrast   Final Result by Manuel Holguin MD (09/19 7545)      No cervical spine fracture or traumatic malalignment  Incidental finding of partially visualized subpleural groundglass opacity in the left pulmonary apex presumably scarring  Cannot exclude infiltrate        This study demonstrates a significant finding and was documented as such in [facility] for liaison and referring practitioner notification  Workstation performed: [id number]           Rest all reviewed    Portions of the record may have been created with voice recognition software  Occasional wrong word or "sound a like" substitutions may have occurred due to the inherent limitations of voice recognition software  Read the chart carefully and recognize, using context, where substitutions have occurred  If you have any questions, please contact the dictating provider

## 2022-10-03 NOTE — PHYSICAL THERAPY NOTE
Physical Therapy Treatment Note     10/03/22 1000   PT Last Visit   PT Visit Date 10/03/22   Note Type   Note Type Treatment   Pain Assessment   Pain Assessment Tool 0-10   Pain Score No Pain   Restrictions/Precautions   Weight Bearing Precautions Per Order No   Other Precautions Cognitive; Bed Alarm; Fall Risk;Telemetry; Restraints;Multiple lines   General   Chart Reviewed Yes   Subjective   Subjective Pt  agreeable to PT  Pt  in bed upon entry  pt  continues with impaired cognition however able to follow cues better this session and more interactive responding with full sentence  Bed Mobility   Rolling R 4  Minimal assistance   Additional items Assist x 2; Increased time required;LE management;Verbal cues; Bedrails   Rolling L 4  Minimal assistance   Additional items Increased time required;LE management;Verbal cues; Assist x 1;Bedrails   Supine to Sit 4  Minimal assistance   Additional items Assist x 2; Increased time required;LE management;Verbal cues   Sit to Supine 4  Minimal assistance   Additional items Increased time required;Verbal cues;LE management;Assist x 2   Transfers   Sit to Stand 4  Minimal assistance   Additional items Assist x 2; Increased time required;Verbal cues; Bedrails   Stand to Sit 4  Minimal assistance   Additional items Assist x 2; Increased time required;Verbal cues   Balance   Static Sitting Fair   Dynamic Sitting Fair -   Static Standing Fair -   Dynamic Standing Poor +   Ambulatory Poor +   Endurance Deficit   Endurance Deficit No   Activity Tolerance   Activity Tolerance Patient tolerated treatment well   Nurse Made Aware Yes   Exercises   TKR Supine;Bilateral;AAROM;20 reps;10 reps   Assessment   Prognosis Guarded   Problem List Decreased strength;Decreased range of motion; Impaired balance;Decreased mobility; Decreased coordination;Decreased cognition; Impaired judgement;Decreased safety awareness   Assessment Pt  showed good progress this session with mobility and her repsonses to cues and following directions  Pt  able to respond and follow cues most of the session  Pt  needed tactile, visual and verbal cues to complete tasks  Pt  was provided with total A for pericare post loose BM in supine  Pt  able to perform STS transfers 4x with Min A x 2 and VCs ad TCs  Noted patient unable to get R feet flat on ground and Rknee in flexion and not full weight bearing  However with the 4th static standing patient able to stand of B/L feet with equal weight distribution  Pt  requires extended time and repepated cues to respond and to process one step directions given  pt  was pleasant and smiling and conversational during session  Pt  in supine post session with bed alarm on and RN was notified of end of session to put the restraints back on  Will continue to follow per PT POC  Pt  able to say bye and wave her hands at the end of session  pt  able to maintain static sitting with CS and standing at EOB with Min A x 2  Cues for postural correction given  Barriers to Discharge Inaccessible home environment;Decreased caregiver support   Goals   Patient Goals None reported   STG Expiration Date 10/14/22   PT Treatment Day 5   Plan   Treatment/Interventions Functional transfer training;LE strengthening/ROM; Therapeutic exercise;Cognitive reorientation;Patient/family training;Bed mobility; Equipment eval/education;Spoke to nursing;Gait training   Progress Slow progress, cognitive deficits   PT Frequency 3-5x/wk   Recommendation   PT Discharge Recommendation Post acute rehabilitation services   Equipment Recommended Other (Comment)  (TBD)   AM-PAC Basic Mobility Inpatient   Turning in Bed Without Bedrails 3   Lying on Back to Sitting on Edge of Flat Bed 3   Moving Bed to Chair 2   Standing Up From Chair 3   Walk in Room 2   Climb 3-5 Stairs 1   Basic Mobility Inpatient Raw Score 14   Basic Mobility Standardized Score 35 55   Highest Level Of Mobility   JH-HLM Goal 4: Move to chair/commode   JH-HLM Achieved 3: Sit at edge of bed   End of Consult   Patient Position at End of Consult Supine;Bed/Chair alarm activated; All needs within reach         Cornell Perry    An AM-PAC basic mobility standardized score less than 42 9 suggest the patient may benefit from discharge to post-acute rehab services

## 2022-10-03 NOTE — NURSING NOTE
Pt pulled out her NG tube at 0600  Notified IM  Dayshift team to reassess need to reinsert  RN will pass information to next shift

## 2022-10-03 NOTE — CASE MANAGEMENT
Case Management Discharge Planning Note    Patient name Smith Stout  Location [facility] Maurice Samaniego* MRN [id number]  : 1970 Date 10/3/2022       Current Admission Date: 2022  Current Admission Diagnosis:Encephalopathy acute   Patient Active Problem List    Diagnosis Date Noted   • Diarrhea 10/03/2022   • Hypokalemia 10/02/2022   • Hypernatremia 10/02/2022   • Aspiration pneumonitis ([address] ) 10/01/2022   • Pulmonary edema 2022   • Bacteremia due to methicillin susceptible Staphylococcus aureus (MSSA) 2022   • Localized swelling of right upper extremity 2022   • Leg edema, right 2022   • D-dimer, elevated 2022   • ARF (acute renal failure) ([address] ) 2022   • Transaminitis 2022   • Encephalopathy acute 2022   • Abnormal CT of the chest 2022   • Traumatic rhabdomyolysis ([address] ) 2022   • Hyperglycemia 2022   • Opioid dependence ([address] ) 2021   • Chronic right shoulder pain 2021   • Internal hemorrhoids 2020   • Adnexal cyst 2020   • Ischemic colitis ([address] ) 2020   • Intercostal neuralgia    • Hematochezia 2019   • Insomnia 2019   • History of rib fracture 10/28/2018   • Tobacco abuse 10/28/2018   • Right knee pain 2018   • Generalized anxiety disorder 2018   • Hypertension    • Hyperlipidemia    • Depression    • COPD (chronic obstructive pulmonary disease) ([address] )    • Chondromalacia patellae 2018   • Irritable bowel syndrome with diarrhea 2018   • DDD (degenerative disc disease), lumbosacral 2014      LOS (days): 14  Geometric Mean LOS (GMLOS) (days):   Days to GMLOS:     OBJECTIVE:  Risk of Unplanned Readmission Score: 28 33         Current admission status: Inpatient   Preferred Pharmacy:   [address] [place] [address]   [address] [place] [id number]  Phone: [phone] Fax: [phone]    [address] [place] [address] [place] [address]  Phone: [phone] Fax: [phone]    Primary Care Provider: Calvin Adame MD    Primary Insurance: BLUE CROSS  Secondary Insurance:     DISCHARGE DETAILS:                                                                                                 Additional Comments: Pt not yet medically cleared-continues with encephalopathy with tx being provided for ANUJA and new aspiration pna- currently on 3Lnc  Pt has required soft restraints over weekend (will need to be d/c'd for 24hrs prior to rehab with behaviors stable)  Acute rehabs and SNF LOC facilities continue to follow

## 2022-10-03 NOTE — ASSESSMENT & PLAN NOTE
· Lumbar radiculopathy due for surgery  · Prior to admission was on gabapentin and morphine IR 15 mg  · Gabapentin resumed at lower dose, hold morphine due to concern for over sedation

## 2022-10-03 NOTE — TELEPHONE ENCOUNTER
Reviewed  I would like to message his primary team in the hospital, and see if I can make some sense for him as well  I will do that Tuesday, and then can let him know  I did try to review the chart

## 2022-10-03 NOTE — PROGRESS NOTES
[facility], [place]  Progress Note - Larry Powell 1970, 45 y o  female MRN: [id number]  Unit/Bed#: [place] [facility] -01 Encounter: [id number]  Primary Care Provider: Johnny Armenta MD   Date and time admitted to hospital: 9/19/2022  8:04 AM    * Encephalopathy acute  Assessment & Plan  · Acute encephalopathy secondary to gabapentin and morphine with subsequent renal failure, potentially component of psychiatric illness given history, restarted back on Psych medications  · Consider hospital delirium due to prolong stay  · Continue Seroquel 50 mg every 12 hours  · Received course of thiamine concern for Wernicke's encephalopathy  · LP and MRI negative for acute pathology, neurology evaluated  · Restarted gabapentin - given concern for withdrawal  · Mental status improving slowly, no focal neurologic deficit  · Consider neurology evaluation if no significant improvement      Bacteremia due to methicillin susceptible Staphylococcus aureus (MSSA)  Assessment & Plan  Patient with MSSA bacteremia, repeat cultures neg for greater than 72 hours  Echo with no vegetation  ID recommend CHERYLE  Continue Ancef, continue po vanc for prophylaxis  Cards recommend holding off CHERYLE until improvement in respiratory status and encephalopathy      D-dimer, elevated  Assessment & Plan  · Elevated D-dimer may be related to fall  · Lower extremity duplex negative for DVT  · Renal dysfunction cannot check CTA of chest but doubt PE  · Discontinued heparin infusion and transitioned to prophylaxis dose    Traumatic rhabdomyolysis [facility])  Assessment & Plan  · Secondary to fall, patient had been laying in bed for approximately 4 days and family members had thought she was taking a deep nap    · She was subsequently brought to the hospital and found to be in acute rhabdomyolysis with ANUJA  · Had been on multiple sedating medications including MS Contin as well as gabapentin, which likely were contributing to sedation  · CK levels trending down  · Was started on aggressive IV hydration, still with acute kidney injury but improving    Abnormal CT of the chest  Assessment & Plan  Repeat CT shows multifocal pneumonia, pleural effusion  Currently on room air, and Ancef for antibiotic  Aspiration precautions, patient did remove feeding tube  Speech evaluated, okay for dysphagia diet pureed  Continue monitor, check procalcitonin    Transaminitis  Assessment & Plan  · Secondary to rhabdomyolysis  No evidence of liver injury on CT imaging  · Seen by GI  Hepatic duplex also negative  Results from last 7 days   Lab Units 10/01/22  0507 10/01/22  0145 09/30/22  0137   AST U/L 56* 66* 68*   ALT U/L 47 45 62   TOTAL BILIRUBIN mg/dL 0 36 0 44 0 40       ARF (acute renal failure) (HCC)  Assessment & Plan  · ANUJA/ATN secondary to rhabdomyolysis  No evidence of renal obstruction on imaging  · Appreciate nephrology evaluation    Awaiting renal recovery  · No emergent need for hemodialysis, consent obtained from family members  · Renal function improving    Results from last 7 days   Lab Units 10/03/22  0913 10/02/22  0505 10/01/22  0507 10/01/22  0145 09/30/22  0137 09/29/22  0513 09/28/22  0542 09/27/22  0557   BUN mg/dL 34* 36* 39* 41* 40* 41* 48* 50*   CREATININE mg/dL 3 73* 4 79* 5 78* 5 88* 6 37* 6 45* 6 94* 7 08*   EGFR ml/min/1 73sq m 13 9 7 7 6 6 6 6       DDD (degenerative disc disease), lumbosacral  Assessment & Plan  · Lumbar radiculopathy due for surgery  · Prior to admission was on gabapentin and morphine IR 15 mg  · Gabapentin resumed at lower dose, hold morphine due to concern for over sedation      Tobacco abuse  Assessment & Plan  · Nicotine patch ordered    Depression  Assessment & Plan  · Does have history of suicide attempt a currently does not show any signs of thoughts of self-harm  · Previously on Effexor, which patient has been refusing either spitting out medication or chewing  · Psychiatry consultation placed and patient case reviewed with on-call psychiatry  · Continue Seroquel 50 mg twice a day   · Zyprexa 5 mg by mouth every 6 hours PRN agitation      VTE Pharmacologic Prophylaxis:   Pharmacologic: Heparin  Mechanical VTE Prophylaxis in Place: Yes    Patient Centered Rounds: I have performed bedside rounds with nursing staff today  Discussions with Specialists or Other Care Team Provider: ID, Cards, Nephro    Education and Discussions with Family / Patient: Called  without phone picked up    Time Spent for Care: 30 minutes  More than 50% of total time spent on counseling and coordination of care as described above  Current Length of Stay: 14 day(s)    Current Patient Status: Inpatient   Certification Statement: The patient will continue to require additional inpatient hospital stay due to Monitor renal functions, pending CHERYLE    Discharge Plan: pending    Code Status: Level 1 - Full Code      Subjective:   No events overnight  Per nursing staff, more awake and alert than day prior  Tolerating dysphagia diet  Pulled NG tube  Has had multiple loose stools  Objective:     Vitals:   Temp (24hrs), Av 8 °F (37 1 °C), Min:98 1 °F (36 7 °C), Max:100 1 °F (37 8 °C)    Temp:  [98 1 °F (36 7 °C)-100 1 °F (37 8 °C)] 98 3 °F (36 8 °C)  HR:  [] 98  Resp:  [14-18] 17  BP: (126-150)/(80-92) 150/92  SpO2:  [93 %-98 %] 95 %  Body mass index is 22 42 kg/m²  Input and Output Summary (last 24 hours):     No intake or output data in the 24 hours ending 10/03/22 1421    Physical Exam:     Physical Exam  Vitals and nursing note reviewed  Constitutional:       General: She is not in acute distress  Appearance: Normal appearance  She is not ill-appearing  HENT:      Head: Normocephalic and atraumatic  Eyes:      General: No scleral icterus  Conjunctiva/sclera: Conjunctivae normal    Cardiovascular:      Rate and Rhythm: Normal rate  Pulses: Normal pulses     Pulmonary:      Effort: Pulmonary effort is normal  No respiratory distress  Abdominal:      General: Bowel sounds are normal  There is no distension  Palpations: Abdomen is soft  Musculoskeletal:         General: No swelling  Right lower leg: No edema  Left lower leg: No edema  Skin:     General: Skin is warm and dry  Neurological:      Mental Status: She is alert  She is disoriented  Psychiatric:         Behavior: Behavior is slowed  Behavior is cooperative  Cognition and Memory: Cognition is impaired  Judgment: Judgment is impulsive  Additional Data:     Labs:    Results from last 7 days   Lab Units 10/03/22  0507   WBC Thousand/uL 20 32*   HEMOGLOBIN g/dL 12 2   HEMATOCRIT % 38 8   PLATELETS Thousands/uL 341   NEUTROS PCT % 80*   LYMPHS PCT % 12*   MONOS PCT % 5   EOS PCT % 1     Results from last 7 days   Lab Units 10/03/22  0913 10/02/22  0505 10/01/22  0507   SODIUM mmol/L 147   < > 144   POTASSIUM mmol/L 3 6   < > 3 2*   CHLORIDE mmol/L 100   < > 102   CO2 mmol/L 35*   < > 33*   BUN mg/dL 34*   < > 39*   CREATININE mg/dL 3 73*   < > 5 78*   ANION GAP mmol/L 12   < > 9   CALCIUM mg/dL 9 7   < > 9 0   ALBUMIN g/dL  --   --  2 3*   TOTAL BILIRUBIN mg/dL  --   --  0 36   ALK PHOS U/L  --   --  76   ALT U/L  --   --  47   AST U/L  --   --  56*   GLUCOSE RANDOM mg/dL 175*   < > 156*    < > = values in this interval not displayed  Results from last 7 days   Lab Units 09/30/22  0024   POC GLUCOSE mg/dl 126         Results from last 7 days   Lab Units 10/01/22  0145 09/27/22  1834   LACTIC ACID mmol/L 1 3 1 5           * I Have Reviewed All Lab Data Listed Above  * Additional Pertinent Lab Tests Reviewed: Matilde 64 Admission Reviewed    Imaging:    CT chest abdomen pelvis wo contrast    Result Date: 9/19/2022  Impression: Bilateral groundglass opacities with superimposed inter and intralobular septal thickening    Differential diagnosis includes pulmonary hemorrhage, infection, and pulmonary edema, although the distribution is atypical for edema  The study was marked in [facility] for immediate notification  Workstation performed: [id number]     XR hip/pelv 2-3 vws right    Result Date: 9/19/2022  Impression: No acute osseous abnormality  Workstation performed: [id number]     XR ankle 3+ views RIGHT    Result Date: 9/19/2022  Impression: No acute osseous abnormality  Workstation performed: [id number]     XR foot 3+ views RIGHT    Result Date: 9/19/2022  Impression: No acute osseous abnormality  Workstation performed: [id number]     CT head without contrast    Result Date: 9/19/2022  Impression: No acute intracranial process  No skull fracture  Workstation performed: [id number]     CT spine cervical without contrast    Result Date: 9/19/2022  Impression: No cervical spine fracture or traumatic malalignment  Incidental finding of partially visualized subpleural groundglass opacity in the left pulmonary apex presumably scarring  Cannot exclude infiltrate  This study demonstrates a significant  finding and was documented as such in [facility] for liaison and referring practitioner notification  Workstation performed: [id number]     MRI brain wo contrast    Result Date: 9/23/2022  Impression: No evidence of recent infarct  No mass effect or midline shift  Study performed in the limited fashion with extensive motion artifact  Workstation performed: [id number]     XR chest 1 view    Result Date: 9/19/2022  Impression: No acute cardiopulmonary disease  Findings are stable Workstation performed: [id number]     US right upper quadrant with liver dopplers    Result Date: 9/20/2022  Impression: 1  Minimal layering sludge without evidence of acute cholecystitis  2   Normal-appearing liver with normal liver Dopplers  Workstation performed: [id number]     CT lower extremity wo contrast right    Result Date: 9/19/2022  Impression: There is no evidence of intramuscular hematoma or other mass lesion in the right calf  Please note that rhabdomyolysis and compartment syndrome are clinical diagnoses, and are not excluded based on these imaging findings  Workstation performed: [id number]       Recent Cultures (last 7 days):     Results from last 7 days   Lab Units 09/28/22  1145 09/28/22  1144 09/26/22  1818   BLOOD CULTURE  No Growth After 4 Days  No Growth After 4 Days  Staphylococcus aureus*  Staphylococcus aureus*   GRAM STAIN RESULT   --   --  Gram positive cocci in clusters*  Gram positive cocci in clusters*       Last 24 Hours Medication List:   Current Facility-Administered Medications   Medication Dose Route Frequency Provider Last Rate   • acetaminophen  650 mg Oral Q6H PRN Blossom Stalls, DO     • ALPRAZolam  0 5 mg Per NG Tube HS PRN CARLITOS Uriostegui     • amLODIPine  10 mg Oral Daily Delisa Randall MD     • cefazolin  2,000 mg Intravenous Q12H Jim Pierce MD 2,000 mg (10/03/22 1300)   • gabapentin  100 mg Per NG Tube TID Virginia Needle, DO     • heparin (porcine)  5,000 Units Subcutaneous Q8H [address] Atul Adan, DO     • hydrALAZINE  5 mg Intravenous Q6H PRN Virginia Needle, DO     • HYDROmorphone  0 5 mg Intravenous Q4H PRN Blossom Stalls, DO     • levalbuterol  1 25 mg Nebulization Q4H PRN Virginia Needle, DO     • metoprolol  5 mg Intravenous Q8H Sam Hill DO     • VANDANA ANTIFUNGAL   Topical BID Aubery Constable Yasemin Hill DO     • nicotine  1 patch Transdermal Daily Atul Adan DO     • OLANZapine  5 mg Oral Q6H PRN Virginia Needle, DO     • ondansetron  4 mg Intravenous Q4H PRN Austin Stalls, DO     • QUEtiapine  50 mg Per NG Tube BID Sam Hill DO          Today, Patient Was Seen By: Yasemin Owens    ** Please Note: Dictation voice to text software may have been used in the creation of this document   **

## 2022-10-03 NOTE — ASSESSMENT & PLAN NOTE
Patient with MSSA bacteremia, repeat cultures neg for greater than 72 hours  Echo with no vegetation  ID recommend CHERYLE  Continue Ancef, continue po vanc for prophylaxis  Cards recommend holding off CHERYLE until improvement in respiratory status and encephalopathy

## 2022-10-03 NOTE — PROGRESS NOTES
Progress Note - Infectious Disease   Papo Verma 45 y o  female MRN: [id number]  Unit/Bed#: [place] 2 -01 Encounter: [id number]      Impression/Plan:  1  SIRS vs Sepsis, not present on admission, a/e/b fever, tachycardia  Mild leukocytosis   Suspect this is likely due to Gram-positive bacteremia  No recurrent high fever spike  No lactic acidosis   COVID-19 negative   UA benign   Status post lumbar puncture for altered mental status with negative ME panel    -antibiotics as below   -follow-up cultures and adjust antibiotics as needed  -monitor temperature and hemodynamics  -recheck CBC and BMP in a m   -recheck procalcitonin level     2  MSSA bacteremia  2 of 2 admission blood cultures postive for MSSA  Patient was found down for prolonged period of time with evidence of multiple wounds on admission  Suspect cutaneous vs endovascular source, likely phlebitis as there is report of RUE erythema surrounding prior IV in addition to palpable cord in left antecub  TTE negative for obvious vegetation  Repeat blood cultures x 2 sets are negative at 24h  CHERYLE planned today now on hold due to tenuous respiratory status  repeat transthoracic echocardiogram limited but negative for valvular vegetation  -increase cefazolin to 2 g IV q 12 hours with improved renal function  -follow up repeat blood cultures  -recommend CHERYLE if able to safely do     3  Toxic Metabolic encephalopathy   Likely multifactorial etiology with uremia playing a role   This was initially thought to be secondary to gabapentin and morphine use in the setting of renal failure, hypotension, rhabdomyolysis   MRI brain negative   Lumbar puncture with opening pressure 26 with negative ME panel  CSF fluid culture not collected  Ongoing workup for autoimmune encephalitis       4  Acute renal failure   Creatinine on admission 5 88 with CK over 32,000   Creatinine reached plateau, now down RBSTSCEV 9 2 today   Likely multifactorial etiology in the setting of rhabdomyolysis and dehydration  Likely ANUJA/ATN   The renal function now continues to improve and the creatinine clearance is now above 11  -renally dose antibiotics as above  -close Nephrology follow-up  -recheck BMP daily     5  Transaminitis   This is likely secondary to rhabdo   LFTs and liver enzymes down trending   No signs of liver damage on imaging   Ammonia level within normal limits   Acute hepatitis panel is negative      6  Right lower extremity swelling and pain   Venous duplex negative   MRI ordered  Ani Fallen following      7  Tobacco abuse   Encourage cessation as recommended by primary team      8  Antibiotic allergy  Hx of hives with PCNs  Tolerated ceftriaxone and now tolerating cefazolin  Monitor for MUSA  9  Acute hypoxic respiratory failure-on oxygen by nasal cannula  Her saturation seem okay off oxygen  CT scan suggest new areas of ground-glass opacification of unclear significance  Possible aspiration pneumonitis versus pneumonia   -monitor respiratory status  -wean off oxygen as able  -recheck procalcitonin level    Discussed the above management plan with the primary service    Antibiotics:  Cefazolin 7    Subjective:  Patient has no fever, chills, sweats; no nausea, vomiting, diarrhea; no cough, shortness of breath; no pain  No new symptoms  She remains confused  She is not requiring any oxygen support  Objective:  Vitals:  Temp:  [98 1 °F (36 7 °C)-100 1 °F (37 8 °C)] 98 3 °F (36 8 °C)  HR:  [] 98  Resp:  [14-18] 17  BP: (126-150)/(80-92) 150/92  SpO2:  [93 %-98 %] 95 %  Temp (24hrs), Av 8 °F (37 1 °C), Min:98 1 °F (36 7 °C), Max:100 1 °F (37 8 °C)  Current: Temperature: 98 3 °F (36 8 °C)    Physical Exam:   General Appearance:  Alert, interactive, confused, nontoxic, no acute distress  Throat: Oropharynx moist without lesions      Lungs:   Clear to auscultation bilaterally; no wheezes, rhonchi or rales; respirations unlabored   Heart:  RRR; no murmur, rub or gallop Abdomen:   Soft, non-tender, non-distended, positive bowel sounds  Extremities: No clubbing, cyanosis or edema   Skin: No new rashes or lesions  No draining wounds noted  Labs, Imaging, & Other studies:   All pertinent labs and imaging studies were personally reviewed  Results from last 7 days   Lab Units 10/03/22  0507 10/02/22  0505 10/01/22  0507   WBC Thousand/uL 20 32* 15 05* 18 54*   HEMOGLOBIN g/dL 12 2 12 2 9 7*   PLATELETS Thousands/uL 341 339 325     Results from last 7 days   Lab Units 10/03/22  0913 10/02/22  0505 10/01/22  0507 10/01/22  0145 09/30/22  0137   SODIUM mmol/L 147 148* 144 146 145   POTASSIUM mmol/L 3 6 3 1* 3 2* 3 1* 3 8   CHLORIDE mmol/L 100 102 102 104 104   CO2 mmol/L 35* 38* 33* 34* 30   BUN mg/dL 34* 36* 39* 41* 40*   CREATININE mg/dL 3 73* 4 79* 5 78* 5 88* 6 37*   EGFR ml/min/1 73sq m 13 9 7 7 6   CALCIUM mg/dL 9 7 9 8 9 0 9 3 9 1   AST U/L  --   --  56* 66* 68*   ALT U/L  --   --  47 45 62   ALK PHOS U/L  --   --  76 91 85     Results from last 7 days   Lab Units 09/28/22  1145 09/28/22  1144 09/26/22  1818   BLOOD CULTURE  No Growth After 4 Days  No Growth After 4 Days   Staphylococcus aureus*  Staphylococcus aureus*   GRAM STAIN RESULT   --   --  Gram positive cocci in clusters*  Gram positive cocci in clusters*        CT chest-persistent multifocal opacification suspicious for edema verses pneumonitis/pneumonia    Images personally reviewed by me in PACS

## 2022-10-03 NOTE — ASSESSMENT & PLAN NOTE
· ANUJA/ATN secondary to rhabdomyolysis  No evidence of renal obstruction on imaging  · Appreciate nephrology evaluation    Awaiting renal recovery  · No emergent need for hemodialysis, consent obtained from family members  · Renal function improving    Results from last 7 days   Lab Units 10/03/22  0913 10/02/22  0505 10/01/22  0507 10/01/22  0145 09/30/22  0137 09/29/22  0513 09/28/22  0542 09/27/22  0557   BUN mg/dL 34* 36* 39* 41* 40* 41* 48* 50*   CREATININE mg/dL 3 73* 4 79* 5 78* 5 88* 6 37* 6 45* 6 94* 7 08*   EGFR ml/min/1 73sq m 13 9 7 7 6 6 6 6

## 2022-10-03 NOTE — PLAN OF CARE
Problem: PAIN - ADULT  Goal: Verbalizes/displays adequate comfort level or baseline comfort level  Description: Interventions:  - Encourage patient to monitor pain and request assistance  - Assess pain using appropriate pain scale  - Administer analgesics based on type and severity of pain and evaluate response  - Implement non-pharmacological measures as appropriate and evaluate response  - Consider cultural and social influences on pain and pain management  - Notify physician/advanced practitioner if interventions unsuccessful or patient reports new pain  Outcome: Progressing     Problem: INFECTION - ADULT  Goal: Absence or prevention of progression during hospitalization  Description: INTERVENTIONS:  - Assess and monitor for signs and symptoms of infection  - Monitor lab/diagnostic results  - Monitor all insertion sites, i e  indwelling lines, tubes, and drains  - Monitor endotracheal if appropriate and nasal secretions for changes in amount and color  - [place] appropriate cooling/warming therapies per order  - Administer medications as ordered  - Instruct and encourage patient and family to use good hand hygiene technique  - Identify and instruct in appropriate isolation precautions for identified infection/condition  Outcome: Progressing     Problem: SAFETY ADULT  Goal: Patient will remain free of falls  Description: INTERVENTIONS:  - Educate patient/family on patient safety including physical limitations  - Instruct patient to call for assistance with activity   - Consult OT/PT to assist with strengthening/mobility   - Keep Call bell within reach  - Keep bed low and locked with side rails adjusted as appropriate  - Keep care items and personal belongings within reach  - Initiate and maintain comfort rounds  - Make Fall Risk Sign visible to staff  - Offer Toileting every 2 Hours, in advance of need  - Initiate/Maintain bed alarm  - Obtain necessary fall risk management equipment    - Apply yellow socks and bracelet for high fall risk patients  - Consider moving patient to room near nurses station  Outcome: Progressing  Goal: Maintain or return to baseline ADL function  Description: INTERVENTIONS:  -  Assess patient's ability to carry out ADLs; assess patient's baseline for ADL function and identify physical deficits which impact ability to perform ADLs (bathing, care of mouth/teeth, toileting, grooming, dressing, etc )  - Assess/evaluate cause of self-care deficits   - Assess range of motion  - Assess patient's mobility; develop plan if impaired  - Assess patient's need for assistive devices and provide as appropriate  - Encourage maximum independence but intervene and supervise when necessary  - Involve family in performance of ADLs  - Assess for home care needs following discharge   - Consider OT consult to assist with ADL evaluation and planning for discharge  - Provide patient education as appropriate  Outcome: Progressing  Goal: Maintains/Returns to pre admission functional level  Description: INTERVENTIONS:  - Perform BMAT or MOVE assessment daily    - Set and communicate daily mobility goal to care team and patient/family/caregiver  - Collaborate with rehabilitation services on mobility goals if consulted  - Perform Range of Motion 2 times a day  - Reposition patient every 2 hours    - Dangle patient 2 times a day  - Stand patient 2 times a day  - Ambulate patient 2 times a day  - Out of bed to chair 2 times a day   - Out of bed for meals 2 times a day  - Out of bed for toileting  - Record patient progress and toleration of activity level   Outcome: Progressing     Problem: DISCHARGE PLANNING  Goal: Discharge to home or other facility with appropriate resources  Description: INTERVENTIONS:  - Identify barriers to discharge w/patient and caregiver  - Arrange for needed discharge resources and transportation as appropriate  - Identify discharge learning needs (meds, wound care, etc )  - Arrange for interpretive services to assist at discharge as needed  - Refer to Case Management Department for coordinating discharge planning if the patient needs post-hospital services based on physician/advanced practitioner order or complex needs related to functional status, cognitive ability, or social support system  Outcome: Progressing     Problem: Knowledge Deficit  Goal: Patient/family/caregiver demonstrates understanding of disease process, treatment plan, medications, and discharge instructions  Description: Complete learning assessment and assess knowledge base  Interventions:  - Provide teaching at level of understanding  - Provide teaching via preferred learning methods  Outcome: Progressing     Problem: Potential for Falls  Goal: Patient will remain free of falls  Description: INTERVENTIONS:  - Educate patient/family on patient safety including physical limitations  - Instruct patient to call for assistance with activity   - Consult OT/PT to assist with strengthening/mobility   - Keep Call bell within reach  - Keep bed low and locked with side rails adjusted as appropriate  - Keep care items and personal belongings within reach  - Initiate and maintain comfort rounds  - Make Fall Risk Sign visible to staff  - Offer Toileting every 2 Hours, in advance of need  - Initiate/Maintain bed alarm  - Obtain necessary fall risk management equipment    - Apply yellow socks and bracelet for high fall risk patients  - Consider moving patient to room near nurses station  Outcome: Progressing     Problem: MOBILITY - ADULT  Goal: Maintain or return to baseline ADL function  Description: INTERVENTIONS:  -  Assess patient's ability to carry out ADLs; assess patient's baseline for ADL function and identify physical deficits which impact ability to perform ADLs (bathing, care of mouth/teeth, toileting, grooming, dressing, etc )  - Assess/evaluate cause of self-care deficits   - Assess range of motion  - Assess patient's mobility; develop plan if impaired  - Assess patient's need for assistive devices and provide as appropriate  - Encourage maximum independence but intervene and supervise when necessary  - Involve family in performance of ADLs  - Assess for home care needs following discharge   - Consider OT consult to assist with ADL evaluation and planning for discharge  - Provide patient education as appropriate  Outcome: Progressing  Goal: Maintains/Returns to pre admission functional level  Description: INTERVENTIONS:  - Perform BMAT or MOVE assessment daily    - Set and communicate daily mobility goal to care team and patient/family/caregiver  - Collaborate with rehabilitation services on mobility goals if consulted  - Perform Range of Motion 2 times a day  - Reposition patient every 2 hours    - Dangle patient 2 times a day  - Stand patient 2 times a day  - Ambulate patient 2 times a day  - Out of bed to chair 2 times a day   - Out of bed for meals 2 times a day  - Out of bed for toileting  - Record patient progress and toleration of activity level   Outcome: Progressing     Problem: Prexisting or High Potential for Compromised Skin Integrity  Goal: Skin integrity is maintained or improved  Description: INTERVENTIONS:  - Identify patients at risk for skin breakdown  - Assess and monitor skin integrity  - Assess and monitor nutrition and hydration status  - Monitor labs   - Assess for incontinence   - Turn and reposition patient  - Assist with mobility/ambulation  - Relieve pressure over bony prominences  - Avoid friction and shearing  - Provide appropriate hygiene as needed including keeping skin clean and dry  - Evaluate need for skin moisturizer/barrier cream  - Collaborate with interdisciplinary team   - Patient/family teaching  - Consider wound care consult   Outcome: Progressing     Problem: Nutrition/Hydration-ADULT  Goal: Nutrient/Hydration intake appropriate for improving, restoring or maintaining nutritional needs  Description: Monitor and assess patient's nutrition/hydration status for malnutrition  Collaborate with interdisciplinary team and initiate plan and interventions as ordered  Monitor patient's weight and dietary intake as ordered or per policy  Utilize nutrition screening tool and intervene as necessary  Determine patient's food preferences and provide high-protein, high-caloric foods as appropriate  INTERVENTIONS:  - Monitor oral intake, urinary output, labs, and treatment plans  - Assess nutrition and hydration status and recommend course of action  - Evaluate amount of meals eaten  - Assist patient with eating if necessary   - Allow adequate time for meals  - Recommend/ encourage appropriate diets, oral nutritional supplements, and vitamin/mineral supplements  - Order, calculate, and assess calorie counts as needed  - Recommend, monitor, and adjust tube feedings and TPN/PPN based on assessed needs  - Assess need for intravenous fluids  - Provide specific nutrition/hydration education as appropriate  - Include patient/family/caregiver in decisions related to nutrition  Outcome: Progressing     Problem: NEUROSENSORY - ADULT  Goal: Achieves maximal functionality and self care  Description: INTERVENTIONS  - Monitor swallowing and airway patency with patient fatigue and changes in neurological status  - Encourage and assist patient to increase activity and self care     - Encourage visually impaired, hearing impaired and aphasic patients to use assistive/communication devices  Outcome: Progressing     Problem: CARDIOVASCULAR - ADULT  Goal: Maintains optimal cardiac output and hemodynamic stability  Description: INTERVENTIONS:  - Monitor I/O, vital signs and rhythm  - Monitor for S/S and trends of decreased cardiac output  - Administer and titrate ordered vasoactive medications to optimize hemodynamic stability  - Assess quality of pulses, skin color and temperature  - Assess for signs of decreased coronary artery perfusion  - Instruct patient to report change in severity of symptoms  Outcome: Progressing     Problem: RESPIRATORY - ADULT  Goal: Achieves optimal ventilation and oxygenation  Description: INTERVENTIONS:  - Assess for changes in respiratory status  - Assess for changes in mentation and behavior  - Position to facilitate oxygenation and minimize respiratory effort  - Oxygen administered by appropriate delivery if ordered  - Initiate smoking cessation education as indicated  - Encourage broncho-pulmonary hygiene including cough, deep breathe, Incentive Spirometry  - Assess the need for suctioning and aspirate as needed  - Assess and instruct to report SOB or any respiratory difficulty  - Respiratory Therapy support as indicated  Outcome: Progressing     Problem: GASTROINTESTINAL - ADULT  Goal: Maintains adequate nutritional intake  Description: INTERVENTIONS:  - Monitor percentage of each meal consumed  - Identify factors contributing to decreased intake, treat as appropriate  - Assist with meals as needed  - Monitor I&O, weight, and lab values if indicated  - Obtain nutrition services referral as needed  Outcome: Progressing     Problem: METABOLIC, FLUID AND ELECTROLYTES - ADULT  Goal: Electrolytes maintained within normal limits  Description: INTERVENTIONS:  - Monitor labs and assess patient for signs and symptoms of electrolyte imbalances  - Administer electrolyte replacement as ordered  - Monitor response to electrolyte replacements, including repeat lab results as appropriate  - Instruct patient on fluid and nutrition as appropriate  Outcome: Progressing  Goal: Fluid balance maintained  Description: INTERVENTIONS:  - Monitor labs   - Monitor I/O and WT  - Instruct patient on fluid and nutrition as appropriate  - Assess for signs & symptoms of volume excess or deficit  Outcome: Progressing     Problem: SKIN/TISSUE INTEGRITY - ADULT  Goal: Skin Integrity remains intact(Skin Breakdown Prevention)  Description: Assess:  -Perform Erickson assessment   -Clean and moisturize skin   -Inspect skin when repositioning, toileting, and assisting with ADLS  -Assess under medical devices   -Assess extremities for adequate circulation and sensation     Bed Management:  -Have minimal linens on bed & keep smooth, unwrinkled  -Change linens as needed when moist or perspiring  -Avoid sitting or lying in one position for more than 2 hours while in bed  -Keep HOB at 30degrees     Toileting:  -Offer bedside commode  -Assess for incontinence  -Use incontinent care products after each incontinent episode,    Activity:  -Mobilize patient 2 times a day  -Encourage activity and walks on unit  -Encourage or provide ROM exercises   -Turn and reposition patient every 2 Hours  -Use appropriate equipment to lift or move patient in bed  -Instruct/ Assist with weight shifting every 2 when out of bed in chair  -Consider limitation of chair time 2 hour intervals    Skin Care:  -Avoid use of baby powder, tape, friction and shearing, hot water or constrictive clothing  -Relieve pressure over bony prominences   -Do not massage red bony areas    Next Steps:  -Teach patient strategies to minimize risks  -Consider consults to  interdisciplinary teams  Outcome: Progressing  Goal: Incision(s), wounds(s) or drain site(s) healing without S/S of infection  Description: INTERVENTIONS  - Assess and document dressing, incision, wound bed, drain sites and surrounding tissue  - Provide patient and family education  - Perform skin care/dressing changes    Outcome: Progressing     Problem: MUSCULOSKELETAL - ADULT  Goal: Maintain or return mobility to safest level of function  Description: INTERVENTIONS:  - Assess patient's ability to carry out ADLs; assess patient's baseline for ADL function and identify physical deficits which impact ability to perform ADLs (bathing, care of mouth/teeth, toileting, grooming, dressing, etc )  - Assess/evaluate cause of self-care deficits   - Assess range of motion  - Assess patient's mobility  - Assess patient's need for assistive devices and provide as appropriate  - Encourage maximum independence but intervene and supervise when necessary  - Involve family in performance of ADLs  - Assess for home care needs following discharge   - Consider OT consult to assist with ADL evaluation and planning for discharge  - Provide patient education as appropriate  Outcome: Progressing

## 2022-10-03 NOTE — ASSESSMENT & PLAN NOTE
Repeat CT shows multifocal pneumonia, pleural effusion  Currently on room air, and Ancef for antibiotic  Aspiration precautions, patient did remove feeding tube  Speech evaluated, okay for dysphagia diet pureed  Continue monitor, check procalcitonin

## 2022-10-03 NOTE — PROGRESS NOTES
Progress Note - Cardiology   Yanet Mascorro 45 y o  female MRN: [id number]  Unit/Bed#: [place] [address] -01 Encounter: [id number]      Assessment/Recommendations/Discussion:   1  MSSA bacteremia 2/2 blood cultures positive  2  Acute respiratory failure, acute diastolic heart failure due to volume overload  3  Toxic metabolic encephalopathy  4  Acute renal failure presumed due to ATN and severe rhabdomyolysis  5  Rhabdomyolysis  6  Depression      Results from last 7 days   Lab Units 10/02/22  1138   SL CV LV EF  43        PLAN  • Her altered mental status is improving although still encephalopathic  • CT showed multifocal opacities concerning for multifocal pneumonia as well as new small moderate bilateral pleural effusions, repeat echo did show a reduction in ejection fraction of 43%  • Renal function continues to improve  Clinically appears to be euvolemic, will hold on further diuretics for now  • Given her chest CT findings and altered mental status however, she is still too high of a risk to pass CHERYLE probe to complete transesophageal echo, she will be a very high risk for intubation at this current time  We will continue to follow along as she improves and plan for CHERYLE once stable from respiratory standpoint  Subjective:   HPI  Awake today, able to interact more so than days prior although quickly will fall back asleep in the middle of trying to answer questions or give blank stare      Review of Systems: As noted in HPI  Rest of ROS is negative      Vitals:   /92 (BP [place] Right arm)   Pulse 98   Temp 98 3 °F (36 8 °C) (Oral)   Resp 17   Ht 5' 6" (1 676 m)   Wt 63 kg (138 lb 14 2 oz)   LMP 03/14/2019 (Approximate)   SpO2 95%   BMI 22 42 kg/m²   I/O       10/01 0701  10/02 0700 10/02 0701  10/03 0700 10/03 0701  10/04 0700    NG/ 100     IV Piggyback       Total Intake(mL/kg) 452 (7 1) 100 (1 6)     Net +452 +100            Unmeasured Urine Occurrence 3 x      Unmeasured Stool Occurrence  6 x         Weight (last 2 days)     Date/Time Weight    10/03/22 0600 63 (138 89)    10/02/22 1115 63 (139)    10/02/22 0600 63 5 (139 99)    10/01/22 0600 63 5 (139 99)          Physical Exam   Constitutional: awake, still with encephalopathy  Head: Normocephalic, without obvious abnormality, atraumatic  Eyes: conjunctivae clear and moist  Sclera anicteric  No xanthelasmas  Pupils equal bilaterally  Extraocular motions are full  Ear nose mouth and throat: ears are symmetrical bilaterally, hearing appears to be equal bilaterally, no nasal discharge or epistaxis, oropharynx is clear with moist mucous membranes  Neck:  Trachea is midline, neck is supple, no thyromegaly or significant lymphadenopathy, there is full range of motion  Lungs:  Coarse breath sounds have improved  Heart: regular rate and rhythm, S1, S2 normal, no murmur, no click, no rub and no gallop, no lower extremity edema  Abdomen: soft, non-tender; bowel sounds normal; no masses,  no organomegaly  Skin: Skin is warm, dry, intact  No obvious rashes or lesions on exposed extremities  Nail beds are pink with no cyanosis or clubbing  Lab Results:  Results from last 7 days   Lab Units 10/03/22  0507   WBC Thousand/uL 20 32*   HEMOGLOBIN g/dL 12 2   HEMATOCRIT % 38 8   PLATELETS Thousands/uL 341     Results from last 7 days   Lab Units 10/03/22  0913 10/02/22  0505 10/01/22  0507   POTASSIUM mmol/L 3 6   < > 3 2*   CHLORIDE mmol/L 100   < > 102   CO2 mmol/L 35*   < > 33*   BUN mg/dL 34*   < > 39*   CREATININE mg/dL 3 73*   < > 5 78*   CALCIUM mg/dL 9 7   < > 9 0   ALK PHOS U/L  --   --  76   ALT U/L  --   --  47   AST U/L  --   --  56*    < > = values in this interval not displayed       Results from last 7 days   Lab Units 10/03/22  0913   POTASSIUM mmol/L 3 6   CHLORIDE mmol/L 100   CO2 mmol/L 35*   BUN mg/dL 34*   CREATININE mg/dL 3 73*   CALCIUM mg/dL 9 7           Medications:    Current Facility-Administered Medications:   • acetaminophen (TYLENOL) tablet 650 mg, 650 mg, Oral, Q6H PRN, Atul Adan DO, 650 mg at 09/22/22 1040  •  ALPRAZolam (XANAX) tablet 0 5 mg, 0 5 mg, Per NG Tube, HS PRN, CARLITOS Durán  •  amLODIPine (NORVASC) tablet 10 mg, 10 mg, Oral, Daily, Enrrique Hill MD, 10 mg at 10/03/22 7419  •  ceFAZolin (ANCEF) IVPB (premix in dextrose) 1,000 mg 50 mL, 1,000 mg, Intravenous, Q24H, Álvaro Knox PA-C, Last Rate: 100 mL/hr at 10/02/22 1829, 1,000 mg at 10/02/22 1829  •  gabapentin (NEURONTIN) oral solution 100 mg, 100 mg, Per NG Tube, TID, Felipe Mtz DO, 100 mg at 10/03/22 0910  •  heparin (porcine) subcutaneous injection 5,000 Units, 5,000 Units, Subcutaneous, Q8H [facility], Meagan Jordan DO, 5,000 Units at 10/03/22 7030  •  hydrALAZINE (APRESOLINE) injection 5 mg, 5 mg, Intravenous, Q6H PRN, Felipe Mtz DO, 5 mg at 10/01/22 1236  •  HYDROmorphone (DILAUDID) injection 0 5 mg, 0 5 mg, Intravenous, Q4H PRN, Meagan Jordan DO, 0 5 mg at 10/01/22 0031  •  levalbuterol (XOPENEX) inhalation solution 1 25 mg, 1 25 mg, Nebulization, Q4H PRN, Felipe Mtz DO  •  metoprolol (LOPRESSOR) injection 5 mg, 5 mg, Intravenous, Q8H, Sam Salgado DO, 5 mg at 10/03/22 0799  •  moisture barrier miconazole 2% cream (aka VANDANA MOISTURE BARRIER ANTIFUNGAL CREAM), , Topical, BID, Sam Salgado DO, Given at 10/03/22 0904  •  nicotine (NICODERM CQ) 14 mg/24hr TD 24 hr patch 1 patch, 1 patch, Transdermal, Daily, Atul Adan DO, 1 patch at 10/03/22 0911  •  OLANZapine (ZyPREXA ZYDIS) dispersible tablet 5 mg, 5 mg, Oral, Q6H PRN, Felipe Mtz DO, 5 mg at 10/02/22 2311  •  ondansetron (ZOFRAN) injection 4 mg, 4 mg, Intravenous, Q4H PRN, Meagan Jordan DO  •  QUEtiapine (SEROquel) tablet 50 mg, 50 mg, Per NG Tube, BID, Felipe Mtz DO, 50 mg at 10/03/22 5844    This note was completed in part utilizing [email or web address] Flu[employer] Direct Software    Grammatical errors, random word insertions, spelling mistakes, and incomplete sentences may be an occasional consequence of this system secondary to software limitations, ambient noise, and hardware issues  If you have any questions or concerns about the content, text, or information contained within the body of this dictation, please contact the provider for clarification      Renetta Mitchell DO, [facility]  10/3/2022 11:23 AM

## 2022-10-03 NOTE — PLAN OF CARE
Problem: PAIN - ADULT  Goal: Verbalizes/displays adequate comfort level or baseline comfort level  Description: Interventions:  - Encourage patient to monitor pain and request assistance  - Assess pain using appropriate pain scale  - Administer analgesics based on type and severity of pain and evaluate response  - Implement non-pharmacological measures as appropriate and evaluate response  - Consider cultural and social influences on pain and pain management  - Notify physician/advanced practitioner if interventions unsuccessful or patient reports new pain  Outcome: Progressing     Problem: INFECTION - ADULT  Goal: Absence or prevention of progression during hospitalization  Description: INTERVENTIONS:  - Assess and monitor for signs and symptoms of infection  - Monitor lab/diagnostic results  - Monitor all insertion sites, i e  indwelling lines, tubes, and drains  - Monitor endotracheal if appropriate and nasal secretions for changes in amount and color  - Minerva appropriate cooling/warming therapies per order  - Administer medications as ordered  - Instruct and encourage patient and family to use good hand hygiene technique  - Identify and instruct in appropriate isolation precautions for identified infection/condition  Outcome: Progressing     Problem: SAFETY ADULT  Goal: Patient will remain free of falls  Description: INTERVENTIONS:  - Educate patient/family on patient safety including physical limitations  - Instruct patient to call for assistance with activity   - Consult OT/PT to assist with strengthening/mobility   - Keep Call bell within reach  - Keep bed low and locked with side rails adjusted as appropriate  - Keep care items and personal belongings within reach  - Initiate and maintain comfort rounds  - Make Fall Risk Sign visible to staff  - Offer Toileting every 2 Hours, in advance of need  - Initiate/Maintain bed alarm  - Obtain necessary fall risk management equipment:   Problem: Potential for Falls  Goal: Patient will remain free of falls  Description: INTERVENTIONS:  - Educate patient/family on patient safety including physical limitations  - Instruct patient to call for assistance with activity   - Consult OT/PT to assist with strengthening/mobility   - Keep Call bell within reach  - Keep bed low and locked with side rails adjusted as appropriate  - Keep care items and personal belongings within reach  - Initiate and maintain comfort rounds  - Make Fall Risk Sign visible to staff  - Offer Toileting every 2 Hours, in advance of need  - Initiate/Maintain bed alarm  - Obtain necessary fall risk management equipment:   - Apply yellow socks and bracelet for high fall risk patients  - Consider moving patient to room near nurses station  Outcome: Progressing     Problem: Nutrition/Hydration-ADULT  Goal: Nutrient/Hydration intake appropriate for improving, restoring or maintaining nutritional needs  Description: Monitor and assess patient's nutrition/hydration status for malnutrition  Collaborate with interdisciplinary team and initiate plan and interventions as ordered  Monitor patient's weight and dietary intake as ordered or per policy  Utilize nutrition screening tool and intervene as necessary  Determine patient's food preferences and provide high-protein, high-caloric foods as appropriate       INTERVENTIONS:  - Monitor oral intake, urinary output, labs, and treatment plans  - Assess nutrition and hydration status and recommend course of action  - Evaluate amount of meals eaten  - Assist patient with eating if necessary   - Allow adequate time for meals  - Recommend/ encourage appropriate diets, oral nutritional supplements, and vitamin/mineral supplements  - Order, calculate, and assess calorie counts as needed  - Recommend, monitor, and adjust tube feedings and TPN/PPN based on assessed needs  - Assess need for intravenous fluids  - Provide specific nutrition/hydration education as appropriate  - Include patient/family/caregiver in decisions related to nutrition  Outcome: Progressing     Problem: Prexisting or High Potential for Compromised Skin Integrity  Goal: Skin integrity is maintained or improved  Description: INTERVENTIONS:  - Identify patients at risk for skin breakdown  - Assess and monitor skin integrity  - Assess and monitor nutrition and hydration status  - Monitor labs   - Assess for incontinence   - Turn and reposition patient  - Assist with mobility/ambulation  - Relieve pressure over bony prominences  - Avoid friction and shearing  - Provide appropriate hygiene as needed including keeping skin clean and dry  - Evaluate need for skin moisturizer/barrier cream  - Collaborate with interdisciplinary team   - Patient/family teaching  - Consider wound care consult   Outcome: Progressing     - Apply yellow socks and bracelet for high fall risk patients  - Consider moving patient to room near nurses station  Outcome: Progressing  Goal: Maintain or return to baseline ADL function  Description: INTERVENTIONS:  -  Assess patient's ability to carry out ADLs; assess patient's baseline for ADL function and identify physical deficits which impact ability to perform ADLs (bathing, care of mouth/teeth, toileting, grooming, dressing, etc )  - Assess/evaluate cause of self-care deficits   - Assess range of motion  - Assess patient's mobility; develop plan if impaired  - Assess patient's need for assistive devices and provide as appropriate  - Encourage maximum independence but intervene and supervise when necessary  - Involve family in performance of ADLs  - Assess for home care needs following discharge   - Consider OT consult to assist with ADL evaluation and planning for discharge  - Provide patient education as appropriate  Outcome: Progressing  Goal: Maintains/Returns to pre admission functional level  Description: INTERVENTIONS:  - Perform BMAT or MOVE assessment daily    - Set and communicate daily mobility goal to care team and patient/family/caregiver  - Collaborate with rehabilitation services on mobility goals if consulted  - Perform Range of Motion 3 times a day  - Reposition patient every 2 hours    - Dangle patient 3 times a day  - Stand patient 3 times a day  - Ambulate patient 3 times a day  - Out of bed to chair 3 times a day   - Out of bed for meals 3 times a day  - Out of bed for toileting  - Record patient progress and toleration of activity level   Outcome: Progressing

## 2022-10-03 NOTE — ASSESSMENT & PLAN NOTE
· Does have history of suicide attempt a currently does not show any signs of thoughts of self-harm  · Previously on Effexor, which patient has been refusing either spitting out medication or chewing  · Psychiatry consultation placed and patient case reviewed with on-call psychiatry  · Continue Seroquel 50 mg twice a day   · Zyprexa 5 mg by mouth every 6 hours PRN agitation

## 2022-10-03 NOTE — PLAN OF CARE
Problem: PHYSICAL THERAPY ADULT  Goal: Performs mobility at highest level of function for planned discharge setting  See evaluation for individualized goals  Description: Treatment/Interventions: Functional transfer training, LE strengthening/ROM, Elevations, Therapeutic exercise, Cognitive reorientation, Patient/family training, Equipment eval/education, Bed mobility, Gait training, Compensatory technique education, Continued evaluation, Spoke to nursing, Spoke to case management, Spoke to MD, OT, ST  Equipment Recommended: Charles (if patient does not own)       See flowsheet documentation for full assessment, interventions and recommendations  Outcome: Progressing  Note: Prognosis: Guarded  Problem List: Decreased strength, Decreased range of motion, Impaired balance, Decreased mobility, Decreased coordination, Decreased cognition, Impaired judgement, Decreased safety awareness  Assessment: Pt  showed good progress this session with mobility and her repsonses to cues and following directions  Pt  able to respond and follow cues most of the session  Pt  needed tactile, visual and verbal cues to complete tasks  Pt  was provided with total A for pericare post loose BM in supine  Pt  able to perform STS transfers 4x with Min A x 2 and VCs ad TCs  Noted patient unable to get R feet flat on ground and Rknee in flexion and not full weight bearing  However with the 4th static standing patient able to stand of B/L feet with equal weight distribution  Pt  requires extended time and repepated cues to respond and to process one step directions given  pt  was pleasant and smiling and conversational during session  Pt  in supine post session with bed alarm on and RN was notified of end of session to put the restraints back on  Will continue to follow per PT POC  Pt  able to say bye and wave her hands at the end of session  pt  able to maintain static sitting with CS and standing at EOB with Min A x 2   Cues for postural correction given  Barriers to Discharge: Inaccessible home environment, Decreased caregiver support  Barriers to Discharge Comments: unable to identify at this time  PT Discharge Recommendation: Post acute rehabilitation services    See flowsheet documentation for full assessment

## 2022-10-03 NOTE — ASSESSMENT & PLAN NOTE
Reports having multiple loose bowel movements, greater than 6 per day over 3 days  Low grade fever noted, rising leukocytosis  Will check c diff study, add po vanc prophylaxis while on ancef

## 2022-10-03 NOTE — OCCUPATIONAL THERAPY NOTE
Occupational Therapy Progress Note     Patient Name: Smith Stout  Today's Date: 10/3/2022  Problem List  Principal Problem:    Encephalopathy acute  Active Problems:    Depression    Tobacco abuse    DDD (degenerative disc disease), lumbosacral    ARF (acute renal failure) (HCC)    Transaminitis    Abnormal CT of the chest    Traumatic rhabdomyolysis (HCC)    D-dimer, elevated    Leg edema, right    Localized swelling of right upper extremity    Bacteremia due to methicillin susceptible Staphylococcus aureus (MSSA)    Pulmonary edema    Aspiration pneumonitis (HCC)    Hypokalemia    Hypernatremia    Diarrhea       10/03/22 0915   OT Last Visit   OT Visit Date 10/03/22   Note Type   Note Type Treatment   Restrictions/Precautions   Weight Bearing Precautions Per Order No   Other Precautions Cognitive; Chair Alarm; Bed Alarm;Multiple lines; Fall Risk;Restraints   Pain Assessment   Pain Assessment Tool 0-10   Pain Score No Pain   Pain Rating: FLACC (Rest) - Face 0   Pain Rating: FLACC (Rest) - Legs 0   Pain Rating: FLACC (Rest) - Activity 0   Pain Rating: FLACC (Rest) - Cry 0   Pain Rating: FLACC (Rest) - Consolability 0   Score: FLACC (Rest) 0   Pain Rating: FLACC (Activity) - Face 0   Pain Rating: FLACC (Activity) - Legs 0   Pain Rating: FLACC (Activity) - Activity 0   Pain Rating: FLACC (Activity) - Cry 0   Pain Rating: FLACC (Activity) - Consolability 0   Score: FLACC (Activity) 0   ADL   Where Assessed Edge of bed   Grooming Assistance 4  Minimal Assistance   Grooming Deficit Setup;Verbal cueing;Supervision/safety; Increased time to complete;Wash/dry face   Grooming Comments Handed pt wipe with instruction to wash face, assisted (hand over hand) pt's hand to face, in which she initiates washing   LB Dressing Assistance 1  Total Assistance   LB Dressing Deficit Don/doff R sock; Don/doff L sock   Toileting Assistance  1  Total Assistance   Toileting Deficit Verbal cueing;Supervison/safety; Increased time to complete;Perineal hygiene   Bed Mobility   Rolling R 4  Minimal assistance   Additional items Assist x 2;Bedrails; Increased time required;Verbal cues;LE management   Rolling L 4  Minimal assistance   Additional items Assist x 2;Bedrails; Increased time required;Verbal cues;LE management   Supine to Sit 4  Minimal assistance   Additional items Assist x 2;HOB elevated; Bedrails; Increased time required;Verbal cues;LE management   Sit to Supine 4  Minimal assistance   Additional items Assist x 2;HOB elevated; Bedrails; Increased time required;Verbal cues;LE management   Additional Comments verbal and tactile cues for technique   Transfers   Sit to Stand 4  Minimal assistance   Additional items Assist x 2;Bedrails; Increased time required;Verbal cues; Other  (HHA)   Stand to Sit 4  Minimal assistance   Additional items Assist x 2;Bedrails; Increased time required;Verbal cues; Other  (HHA)   Cognition   Overall Cognitive Status Impaired   Arousal/Participation Arousable;Responsive   Attention Difficulty attending to directions   Orientation Level Oriented to person  (Oriented to first name; last night requires hints; unable to state birthday)   Memory Decreased recall of precautions;Decreased recall of recent events;Decreased short term memory;Decreased recall of biographical information   Following Commands Follows one step commands with increased time or repetition   Comments Attends to 1 step commands about 50% of the time; more conversational today with full sentences   Activity Tolerance   Activity Tolerance Patient limited by fatigue;Treatment limited secondary to medical complications (Comment)  (Elevated HR)   Medical Staff Made Aware PTA, nrsg   Assessment   Assessment Patient participated in skilled OT session this date with interventions consisting of therapeutic activities, neuro-reeducation, and self-care/ADL training to increase B UE strength, functional endurance/activity tolerance, static/dynamic sitting/standing balance, and overall safety awareness to increase (I) with ADLs/IADLs to return to PLOF  Patient agreeable to OT treatment session, upon arrival patient was found supine in bed  Patient requiring verbal cues for safety and verbal cues for pacing through activity steps  Pt participates in grooming, LB dressing, toileting and multiple transfers this date  Refer to flowsheet for functional performance  Patient more conversational today; responding with sentences 3-5 words  Requires frequent cuing to keep eyes open  Follows one step commands with about 50% accuracy  Able to engage in more routine tasks with cuing  HR between 111-163 BPM throughout tx session  She has BM during transfers requiring assistance with clean up  Standing tolerance between 30 seconds-1 minute for 4 STSs with HHAs  Sitting balance- SBA-Nicolette, standing balance - minAx2 with HHA  Attempted functional reaching/dynamic sitting balance task, in which pt requires max cuing for task; requires tactile cues to return to midline, 5/5 reaches  Did ask patient if she was nervous, to which she replied "Yes " Patient continues to be functioning below baseline level, occupational performance remains limited secondary to factors listed above and increased risk for falls and injury  The patient's raw score on the AM-PAC Daily Activity inpatient short form is 9, standardized score is 29 04, less than 39 4  Patients at this level are likely to benefit from discharge to post-acute rehabilitation services  Please refer to the recommendation of the Occupational Therapist for safe discharge planning  Co treatment with PT secondary to complex medical condition of pt, possible A of 2 required to achieve and maintain transitional movements, requiring the need of skilled therapeutic intervention of 2 therapists to achieve delivery of services  Plan   Treatment Interventions ADL retraining;Functional transfer training;UE strengthening/ROM; Endurance training;Cognitive reorientation;Patient/family training;Equipment evaluation/education; Neuromuscular reeducation; Compensatory technique education;Continued evaluation; Energy conservation; Activityengagement   Goal Expiration Date 10/04/22   OT Treatment Day 3   OT Frequency 3-5x/wk   Recommendation   OT Discharge Recommendation Post acute rehabilitation services   AM-PAC Daily Activity Inpatient   Lower Body Dressing 1   Bathing 1   Toileting 1   Upper Body Dressing 1   Grooming 3   Eating 2   Daily Activity Raw Score 9   Turning Head Towards Sound 3   Follow Simple Instructions 2   Low Function Daily Activity Raw Score 14   Low Function Daily Activity Standardized Score 24 79   AM-PAC Applied Cognition Inpatient   Following a Speech/Presentation 2   Understanding Ordinary Conversation 2   Taking Medications 1   Remembering Where Things Are Placed or Put Away 1   Remembering List of 4-5 Errands 1   Taking Care of Complicated Tasks 1   Applied Cognition Raw Score 8   Applied Cognition Standardized Score 19 32     Lawerence Sour

## 2022-10-03 NOTE — QUICK NOTE
QUICK NOTE - Deterioration Index  Papo Verma 45 y o  female MRN: [id number]  Unit/Bed#: [address] 87 217-01 Encounter: [id number]    Date Paged: 10/03/22  Time Paged: [address]  Room #: [facility] Time: 3774  Deterioration index score at time of page: 66 64  %  Spoke with Dr Hao Pineda from primary team and Dr Cody Angry from ID  Need to escalate level of care: no     PROBLEMS resulting in high DI score:   • Tachycardia, encephalopathy, ANUJA    PLAN:     • Continue current antibiotic regimen  Continue OT in room with patient at this time  F/u AM labs  Please contact critical care via [employer] with any questions or concerns       Vitals:   Vitals:    10/02/22 1930 10/02/22 2157 10/03/22 0600 10/03/22 0722   BP:  126/80  150/92   BP [place]  Right arm  Right arm   Pulse:  (!) 111  98   Resp:  18  17   Temp:  100 1 °F (37 8 °C)  98 3 °F (36 8 °C)   TempSrc:  Oral  Oral   SpO2: 98% 97%  93%   Weight:   63 kg (138 lb 14 2 oz)    Height:           Respiratory:  SpO2: SpO2: 93 %, SpO2 Activity: SpO2 Activity: At Rest, SpO2 Device: O2 Device: Nasal cannula  Nasal Cannula O2 Flow Rate (L/min): 3 L/min    Temperature: Temp (24hrs), Av 8 °F (37 1 °C), Min:98 1 °F (36 7 °C), Max:100 1 °F (37 8 °C)  Current: Temperature: 98 3 °F (36 8 °C)    Labs:   Results from last 7 days   Lab Units 10/03/22  0507 10/02/22  0505 10/01/22  0507   WBC Thousand/uL 20 32* 15 05* 18 54*   HEMOGLOBIN g/dL 12 2 12 2 9 7*   HEMATOCRIT % 38 8 38 4 31 2*   PLATELETS Thousands/uL 341 339 325   NEUTROS PCT % 80* 83* 85*   MONOS PCT % 5 5 5     Results from last 7 days   Lab Units 10/02/22  0505 10/01/22  0507 10/01/22  0145 22  0137   SODIUM mmol/L 148* 144 146 145   POTASSIUM mmol/L 3 1* 3 2* 3 1* 3 8   CHLORIDE mmol/L 102 102 104 104   CO2 mmol/L 38* 33* 34* 30   BUN mg/dL 36* 39* 41* 40*   CREATININE mg/dL 4 79* 5 78* 5 88* 6 37*   CALCIUM mg/dL 9 8 9 0 9 3 9 1   ALK PHOS U/L  --  76 91 85   ALT U/L  --  47 45 62   AST U/L  --  56* 66* 68*     Results from last 7 days   Lab Units 10/03/22  0507 10/02/22  0505 10/01/22  0507   MAGNESIUM mg/dL 1 7 1 9 2 1     Results from last 7 days   Lab Units 10/01/22  0145 09/27/22  1834   LACTIC ACID mmol/L 1 3 1 5               Code Status: Level 1 - Full Code

## 2022-10-03 NOTE — ASSESSMENT & PLAN NOTE
· Acute encephalopathy secondary to gabapentin and morphine with subsequent renal failure, potentially component of psychiatric illness given history, restarted back on Psych medications  · Consider hospital delirium due to prolong stay  · Continue Seroquel 50 mg every 12 hours  · Received course of thiamine concern for Wernicke's encephalopathy  · LP and MRI negative for acute pathology, neurology evaluated  · Restarted gabapentin - given concern for withdrawal  · Mental status improving slowly, no focal neurologic deficit  · Consider neurology evaluation if no significant improvement

## 2022-10-03 NOTE — PLAN OF CARE
Problem: OCCUPATIONAL THERAPY ADULT  Goal: Performs self-care activities at highest level of function for planned discharge setting  See evaluation for individualized goals  Description: Treatment Interventions: ADL retraining, Functional transfer training, UE strengthening/ROM, Endurance training, Cognitive reorientation, Patient/family training, Equipment evaluation/education, Neuromuscular reeducation, Compensatory technique education, Continued evaluation, Energy conservation, Activityengagement     See flowsheet documentation for full assessment, interventions and recommendations  Outcome: Progressing  Note: Limitation: Decreased ADL status, Decreased Safe judgement during ADL, Decreased cognition, Decreased endurance, Decreased high-level ADLs     Assessment: Patient participated in skilled OT session this date with interventions consisting of therapeutic activities, neuro-reeducation, and self-care/ADL training to increase B UE strength, functional endurance/activity tolerance, static/dynamic sitting/standing balance, and overall safety awareness to increase (I) with ADLs/IADLs to return to PLOF  Patient agreeable to OT treatment session, upon arrival patient was found supine in bed  Patient requiring verbal cues for safety and verbal cues for pacing through activity steps  Pt participates in grooming, LB dressing, toileting and multiple transfers this date  Refer to flowsheet for functional performance  Patient more conversational today; responding with sentences 3-5 words  Requires frequent cuing to keep eyes open  Follows one step commands with about 50% accuracy  Able to engage in more routine tasks with cuing  HR between 111-163 BPM throughout tx session  She has BM during transfers requiring assistance with clean up  Standing tolerance between 30 seconds-1 minute for 4 STSs with HHA  Sitting balance- SBA-Nicolette, standing balance - minAx2 with HHA   Attempted functional reaching/dynamic sitting balance task, in which pt requires max cuing for task; requires tactile cues to return to midline, 5/5 reaches  Did ask patient if she was nervous, to which she replied "Yes " Patient continues to be functioning below baseline level, occupational performance remains limited secondary to factors listed above and increased risk for falls and injury  The patient's raw score on the AM-PAC Daily Activity inpatient short form is 9, standardized score is 29 04, less than 39 4  Patients at this level are likely to benefit from discharge to post-acute rehabilitation services  Please refer to the recommendation of the Occupational Therapist for safe discharge planning  Co treatment with PT secondary to complex medical condition of pt, possible A of 2 required to achieve and maintain transitional movements, requiring the need of skilled therapeutic intervention of 2 therapists to achieve delivery of services       OT Discharge Recommendation: Post acute rehabilitation services

## 2022-10-03 NOTE — ASSESSMENT & PLAN NOTE
· Secondary to fall, patient had been laying in bed for approximately 4 days and family members had thought she was taking a deep nap    · She was subsequently brought to the hospital and found to be in acute rhabdomyolysis with ANUJA  · Had been on multiple sedating medications including MS Contin as well as gabapentin, which likely were contributing to sedation  · CK levels trending down  · Was started on aggressive IV hydration, still with acute kidney injury but improving

## 2022-10-03 NOTE — NURSING NOTE
Patient noted to have diminished urine production  Patient bladder scanned for 750mL  Per urinary retention protocol patient was strait cathed for 850mL  Dr Estelita Guerrero made aware

## 2022-10-04 PROBLEM — G92.8 TOXIC METABOLIC ENCEPHALOPATHY: Status: ACTIVE | Noted: 2022-09-19

## 2022-10-04 LAB
ALBUMIN SERPL BCP-MCNC: 2.5 G/DL (ref 3.5–5)
ALP SERPL-CCNC: 78 U/L (ref 46–116)
ALT SERPL W P-5'-P-CCNC: 18 U/L (ref 12–78)
ANION GAP SERPL CALCULATED.3IONS-SCNC: 7 MMOL/L (ref 4–13)
AST SERPL W P-5'-P-CCNC: 30 U/L (ref 5–45)
BASOPHILS # BLD AUTO: 0.13 THOUSANDS/ÂΜL (ref 0–0.1)
BASOPHILS NFR BLD AUTO: 1 % (ref 0–1)
BILIRUB SERPL-MCNC: 0.36 MG/DL (ref 0.2–1)
BUN SERPL-MCNC: 34 MG/DL (ref 5–25)
C DIFF TOX GENS STL QL NAA+PROBE: NEGATIVE
CALCIUM ALBUM COR SERPL-MCNC: 10.9 MG/DL (ref 8.3–10.1)
CALCIUM SERPL-MCNC: 9.7 MG/DL (ref 8.3–10.1)
CHLORIDE SERPL-SCNC: 102 MMOL/L (ref 96–108)
CO2 SERPL-SCNC: 37 MMOL/L (ref 21–32)
CREAT SERPL-MCNC: 3.07 MG/DL (ref 0.6–1.3)
EOSINOPHIL # BLD AUTO: 0.1 THOUSAND/ÂΜL (ref 0–0.61)
EOSINOPHIL NFR BLD AUTO: 1 % (ref 0–6)
ERYTHROCYTE [DISTWIDTH] IN BLOOD BY AUTOMATED COUNT: 14.3 % (ref 11.6–15.1)
GFR SERPL CREATININE-BSD FRML MDRD: 16 ML/MIN/1.73SQ M
GLUCOSE SERPL-MCNC: 137 MG/DL (ref 65–140)
HCT VFR BLD AUTO: 34.5 % (ref 34.8–46.1)
HGB BLD-MCNC: 10.8 G/DL (ref 11.5–15.4)
IMM GRANULOCYTES # BLD AUTO: 0.2 THOUSAND/UL (ref 0–0.2)
IMM GRANULOCYTES NFR BLD AUTO: 1 % (ref 0–2)
LYMPHOCYTES # BLD AUTO: 2.69 THOUSANDS/ÂΜL (ref 0.6–4.47)
LYMPHOCYTES NFR BLD AUTO: 15 % (ref 14–44)
MCH RBC QN AUTO: 32 PG (ref 26.8–34.3)
MCHC RBC AUTO-ENTMCNC: 31.3 G/DL (ref 31.4–37.4)
MCV RBC AUTO: 102 FL (ref 82–98)
MISCELLANEOUS LAB TEST RESULT: NORMAL
MISCELLANEOUS LAB TEST RESULT: NORMAL
MONOCYTES # BLD AUTO: 0.88 THOUSAND/ÂΜL (ref 0.17–1.22)
MONOCYTES NFR BLD AUTO: 5 % (ref 4–12)
NEUTROPHILS # BLD AUTO: 14.05 THOUSANDS/ÂΜL (ref 1.85–7.62)
NEUTS SEG NFR BLD AUTO: 77 % (ref 43–75)
NRBC BLD AUTO-RTO: 0 /100 WBCS
PLATELET # BLD AUTO: 320 THOUSANDS/UL (ref 149–390)
PMV BLD AUTO: 11.1 FL (ref 8.9–12.7)
POTASSIUM SERPL-SCNC: 2.9 MMOL/L (ref 3.5–5.3)
PROCALCITONIN SERPL-MCNC: 0.26 NG/ML
PROT SERPL-MCNC: 6.7 G/DL (ref 6.4–8.4)
RBC # BLD AUTO: 3.37 MILLION/UL (ref 3.81–5.12)
SODIUM SERPL-SCNC: 146 MMOL/L (ref 135–147)
WBC # BLD AUTO: 18.05 THOUSAND/UL (ref 4.31–10.16)

## 2022-10-04 RX ORDER — POTASSIUM CHLORIDE 20 MEQ/1
40 TABLET, EXTENDED RELEASE ORAL ONCE
Status: COMPLETED | OUTPATIENT
Start: 2022-10-04 | End: 2022-10-04

## 2022-10-04 RX ORDER — SODIUM CHLORIDE 450 MG/100ML
100 INJECTION, SOLUTION INTRAVENOUS CONTINUOUS
Status: DISPENSED | OUTPATIENT
Start: 2022-10-04 | End: 2022-10-04

## 2022-10-04 RX ORDER — SACCHAROMYCES BOULARDII 250 MG
250 CAPSULE ORAL 2 TIMES DAILY
Status: DISCONTINUED | OUTPATIENT
Start: 2022-10-04 | End: 2022-11-07 | Stop reason: HOSPADM

## 2022-10-04 RX ORDER — QUETIAPINE FUMARATE 25 MG/1
50 TABLET, FILM COATED ORAL 2 TIMES DAILY
Status: DISCONTINUED | OUTPATIENT
Start: 2022-10-04 | End: 2022-11-07 | Stop reason: HOSPADM

## 2022-10-04 RX ORDER — GABAPENTIN 250 MG/5ML
100 SOLUTION ORAL 3 TIMES DAILY
Status: DISCONTINUED | OUTPATIENT
Start: 2022-10-04 | End: 2022-10-06

## 2022-10-04 RX ORDER — ALPRAZOLAM 0.5 MG/1
0.5 TABLET ORAL
Status: DISCONTINUED | OUTPATIENT
Start: 2022-10-04 | End: 2022-11-07 | Stop reason: HOSPADM

## 2022-10-04 RX ORDER — GABAPENTIN 250 MG/5ML
100 SOLUTION ORAL 3 TIMES DAILY
Status: DISCONTINUED | OUTPATIENT
Start: 2022-10-04 | End: 2022-10-04

## 2022-10-04 RX ADMIN — POTASSIUM CHLORIDE 40 MEQ: 1500 TABLET, EXTENDED RELEASE ORAL at 08:57

## 2022-10-04 RX ADMIN — METOROPROLOL TARTRATE 5 MG: 5 INJECTION, SOLUTION INTRAVENOUS at 12:57

## 2022-10-04 RX ADMIN — VANCOMYCIN HYDROCHLORIDE 125 MG: 500 INJECTION, POWDER, LYOPHILIZED, FOR SOLUTION INTRAVENOUS at 09:00

## 2022-10-04 RX ADMIN — CEFAZOLIN SODIUM 2000 MG: 2 SOLUTION INTRAVENOUS at 00:26

## 2022-10-04 RX ADMIN — GABAPENTIN 100 MG: 250 SUSPENSION ORAL at 09:00

## 2022-10-04 RX ADMIN — MICONAZOLE NITRATE: 20 CREAM TOPICAL at 17:13

## 2022-10-04 RX ADMIN — METOPROLOL TARTRATE 25 MG: 25 TABLET, FILM COATED ORAL at 21:10

## 2022-10-04 RX ADMIN — HEPARIN SODIUM 5000 UNITS: 5000 INJECTION INTRAVENOUS; SUBCUTANEOUS at 13:27

## 2022-10-04 RX ADMIN — VANCOMYCIN HYDROCHLORIDE 125 MG: 500 INJECTION, POWDER, LYOPHILIZED, FOR SOLUTION INTRAVENOUS at 21:11

## 2022-10-04 RX ADMIN — AMLODIPINE BESYLATE 10 MG: 10 TABLET ORAL at 08:51

## 2022-10-04 RX ADMIN — CEFAZOLIN SODIUM 2000 MG: 2 SOLUTION INTRAVENOUS at 12:26

## 2022-10-04 RX ADMIN — HEPARIN SODIUM 5000 UNITS: 5000 INJECTION INTRAVENOUS; SUBCUTANEOUS at 05:30

## 2022-10-04 RX ADMIN — QUETIAPINE FUMARATE 50 MG: 25 TABLET ORAL at 08:51

## 2022-10-04 RX ADMIN — SODIUM CHLORIDE 100 ML/HR: 450 INJECTION, SOLUTION INTRAVENOUS at 09:00

## 2022-10-04 RX ADMIN — POTASSIUM CHLORIDE 40 MEQ: 1500 TABLET, EXTENDED RELEASE ORAL at 12:25

## 2022-10-04 RX ADMIN — METOROPROLOL TARTRATE 5 MG: 5 INJECTION, SOLUTION INTRAVENOUS at 05:30

## 2022-10-04 RX ADMIN — HEPARIN SODIUM 5000 UNITS: 5000 INJECTION INTRAVENOUS; SUBCUTANEOUS at 22:56

## 2022-10-04 RX ADMIN — CEFAZOLIN SODIUM 2000 MG: 2 SOLUTION INTRAVENOUS at 23:14

## 2022-10-04 RX ADMIN — QUETIAPINE FUMARATE 50 MG: 25 TABLET ORAL at 17:13

## 2022-10-04 RX ADMIN — MICONAZOLE NITRATE: 20 CREAM TOPICAL at 09:04

## 2022-10-04 RX ADMIN — GABAPENTIN 100 MG: 250 SUSPENSION ORAL at 21:11

## 2022-10-04 RX ADMIN — Medication 1 PATCH: at 08:51

## 2022-10-04 RX ADMIN — Medication 250 MG: at 17:13

## 2022-10-04 RX ADMIN — GABAPENTIN 100 MG: 250 SUSPENSION ORAL at 16:56

## 2022-10-04 NOTE — ASSESSMENT & PLAN NOTE
· Lumbar radiculopathy with previously scheduled surgery now on hold  · Prior to admission was on gabapentin and morphine IR 15 mg  · Gabapentin resumed at lower dose, hold morphine due to concern for over sedation

## 2022-10-04 NOTE — PROGRESS NOTES
Follow up Consultation    Nephrology   Rommel Figueredo 45 y o  female MRN: [id number]  Unit/Bed#: [address] 87 217-01 Encounter: [id number]      Physician Requesting Consult: Sarabjit Lofton MD        ASSESSMENT/PLAN:  62 year female multiple comorbidities including GERD, depression, substance abuse admitted with altered mental status  Nephrology following for acute kidney injury  Acute kidney injury (POA):  ANUJA multifactorial most likely secondary to severe rhabdomyolysis plus some component prerenal azotemia plus likely some component of infectious GN due to MSSA bacteremia  After review of records In [facility] as well as Care everywhere it appears that the patient has a baseline Creatinine of 0 8-1 1 mg/dL  patient was admitted with a creatinine of 5 88 mg/dL on 09/19/2022  Peak creatinine this hospitalization at 7 67 mg/dL on 09/25/2022  patient's creatinine today is at 3 07 mg/dL, stable and continues to improve  No acute indication for dialysis at this time  Dialysis consent was obtained previously per by my colleague and is in the chart if needed  Overall patient does not appear to be good candidate for dialysis since she has been pulling out tubes  I am not sure if she will be able to even hold still for dialysis and allow for the dialysis catheter to stay in  In light of improving renal function will hold off on dialysis for now  If renal function continue to deteriorate we may need to rediscuss with spouse further management  Clinically appears to be hypovolemic to euvolemic hold off further  diuretics at this time   Place on half-normal saline at 100 cc an hour for 1 L then DC  check BMP in a m  Await renal recovery  Optimize hemodynamic status to avoid delay in renal recovery  Place on a renal diet when allowed diet order  Avoid nephrotoxins, adjust meds to appropriate GFR  Strict I/O    Daily weights  Urinary retention protocol if patient does not have a Rivera  will need to set up patient for follow up with Nephrology as an outpatient post hospitalization  Blood pressure/hypertension:  current medications:  Norvasc 10 mg p o  Q day, metoprolol 5 mg IV q 8  recommendations:  Last dose of Bumex 2 mg on 10/02/2022 if patient with worsening shortness of breath may give additional dosage  Optimize hemodynamics  Maintain MAP > 65mmHg  Avoid BP fluctuations  H/H/anemia:  most recent hemoglobin at 12 2 grams/deciliter  maintain hemoglobin greater than 8 grams/deciliter    Acid-base electrolytes:    Electrolytes:    Stable  Hypernatremia:   Most recent sodium at 146 mEq  Encourage free water intake for now  Will give half-normal saline at 100 cc an hour for 1 L then DC    Hypokalemia:  Most recent potassium at 2 9 mEq  Supplement  Recheck    Hyperphosphatemia:  Most recent phosphorus down to 2 6 improving    Acid-base:    Most recent bicarb at 37    Other medical problems:  Proteinuria: Most recent UA from 09/19/2022 with trace protein  MSSA bacteremia:  Management per primary team   On Ancef  CT chest concerning for multifocal opacities concerning for multifocal pneumonia new small moderate bilateral pleural effusions  Altered mental status/encephalopathy:  Appears to be multifactorial secondary to underlying psych illness plus gabapentin plus morphine usage plus some contribution from renal dysfunction  Overall improving  Management per primary team      Thanks for the consult  Will continue to follow  Please call with questions/ concerns  Above-mentioned orders and Plan in terms of acute kidney injury was discussed with the team in depth be a Tiger laura Vazquez, 10/4/2022, 8:14 AM              Objective :   Patient seen and examined in her room no overnight events hemodynamically stable remains afebrile urine output 850 cc via straight cath  Remains in restraints    Pleasantly confused      PHYSICAL EXAM  /88   Pulse 104   Temp (!) 97 °F (36 1 °C)   Resp 16   Ht 5' 6" (1 676 m)   Wt 51 5 kg (113 lb 8 6 oz)   LMP 2019 (Approximate)   SpO2 93%   BMI 18 33 kg/m²   Temp (24hrs), Av 6 °F (36 4 °C), Min:97 °F (36 1 °C), Max:98 2 °F (36 8 °C)        Intake/Output Summary (Last 24 hours) at 10/4/2022 0814  Last data filed at 10/3/2022 1845  Gross per 24 hour   Intake --   Output 850 ml   Net -850 ml       I/O last 24 hours: In: -   Out: 850 [Urine:850]      Current Weight: Weight - Scale: 51 5 kg (113 lb 8 6 oz)  First Weight: Weight - Scale: 62 6 kg (138 lb 0 1 oz)  Physical Exam  Vitals and nursing note reviewed  Constitutional:       General: She is not in acute distress  Appearance: Normal appearance  She is normal weight  She is ill-appearing  She is not toxic-appearing or diaphoretic  HENT:      Head: Normocephalic and atraumatic  Mouth/Throat:      Pharynx: Oropharynx is clear  No oropharyngeal exudate  Eyes:      General: No scleral icterus  Conjunctiva/sclera: Conjunctivae normal    Cardiovascular:      Rate and Rhythm: Normal rate  Heart sounds: Normal heart sounds  No friction rub  Pulmonary:      Effort: Pulmonary effort is normal  No respiratory distress  Breath sounds: Normal breath sounds  No stridor  No wheezing  Abdominal:      General: There is no distension  Palpations: Abdomen is soft  There is no mass  Tenderness: There is no abdominal tenderness  Musculoskeletal:         General: No swelling  Cervical back: Normal range of motion and neck supple  No rigidity  Skin:     General: Skin is warm  Coloration: Skin is not jaundiced  Neurological:      General: No focal deficit present  Mental Status: She is alert  She is disoriented  Psychiatric:         Mood and Affect: Mood normal              Review of Systems   Unable to perform ROS: Mental status change   Constitutional: Negative for fever  Respiratory: Negative for cough  Gastrointestinal: Negative for vomiting     Genitourinary: Negative for dysuria  Musculoskeletal: Negative for back pain  Neurological: Negative for headaches  Scheduled Meds:  Current Facility-Administered Medications   Medication Dose Route Frequency Provider Last Rate   • acetaminophen  650 mg Oral Q6H PRN Raudel Palomo DO     • ALPRAZolam  0 5 mg Per NG Tube HS PRN CARLITOS Donato     • amLODIPine  10 mg Oral Daily Joseph Montero MD     • cefazolin  2,000 mg Intravenous Q12H Shanika Duenas MD 2,000 mg (10/04/22 0026)   • gabapentin  100 mg Per NG Tube TID Chato Manzanares DO     • heparin (porcine)  5,000 Units Subcutaneous [facility] Atul Adan, DO     • hydrALAZINE  5 mg Intravenous Q6H PRN Chato Manzanares DO     • HYDROmorphone  0 5 mg Intravenous Q4H PRN Raudel Palomo DO     • levalbuterol  1 25 mg Nebulization Q4H PRN Chato Manzanares DO     • metoprolol  5 mg Intravenous Q8H Sam Alas DO     • VANDANA ANTIFUNGAL   Topical BID Chato Manzanares DO     • nicotine  1 patch Transdermal Daily Atul Adan DO     • OLANZapine  5 mg Oral Q6H PRN Chato Manzanares DO     • ondansetron  4 mg Intravenous Q4H PRN Raudel Palomo DO     • QUEtiapine  50 mg Per NG Tube BID Chato Manzanares DO     • vancomycin  125 mg Oral Q12H [facility] Inder Petersen MD         PRN Meds: •  acetaminophen  •  ALPRAZolam  •  hydrALAZINE  •  HYDROmorphone  •  levalbuterol  •  OLANZapine  •  ondansetron    Continuous Infusions:       Invasive Devices:      Invasive Devices  Report    Peripheral Intravenous Line  Duration           Peripheral IV 10/04/22 Proximal;Right;Ventral (anterior) Forearm <1 day          Drain  Duration           NG/OG/Enteral Tube Enteral Feeding Tube Right nare 3 days                  LABORATORY:    Results from last 7 days   Lab Units 10/04/22  0444 10/03/22  0913 10/03/22  0507 10/02/22  0505 10/01/22  0507 10/01/22  0145 09/30/22  0137 09/29/22  0513   WBC Thousand/uL 18 05*  --  20 32* 15 05* 18 54* 19 44* 14 78* 9 27   HEMOGLOBIN g/dL 10 8*  --  12 2 12 2 9 7* 11 0* 9 8* 9 7*   HEMATOCRIT % 34 5*  --  38 8 38 4 31 2* 34 4* 31 0* 30 4*   PLATELETS Thousands/uL 320  --  341 339 325 336 280 282   POTASSIUM mmol/L 2 9* 3 6  --  3 1* 3 2* 3 1* 3 8 3 2*   CHLORIDE mmol/L 102 100  --  102 102 104 104 103   CO2 mmol/L 37* 35*  --  38* 33* 34* 30 30   BUN mg/dL 34* 34*  --  36* 39* 41* 40* 41*   CREATININE mg/dL 3 07* 3 73*  --  4 79* 5 78* 5 88* 6 37* 6 45*   CALCIUM mg/dL 9 7 9 7  --  9 8 9 0 9 3 9 1 8 9   MAGNESIUM mg/dL  --   --  1 7 1 9 2 1 1 7  --  1 8   PHOSPHORUS mg/dL  --   --  2 6* 2 6* 5 3* 5 4*  --   --       rest all reviewed    RADIOLOGY:  CT chest wo contrast   Final Result by Neeru Amezquita MD (10/01 0469)      Persistent multifocal pulmonary opacities with new consolidative opacity in right lower lobe, suspicious for combination of multifocal pneumonia and pulmonary edema  New small-to-moderate bilateral pleural effusions (right worse than left), worsened bilateral lower lobe subsegmental atelectasis (right worse than left), and new mild-to-moderate body wall edema  The study was marked in [facility] for immediate notification  Workstation performed: [id number]         XR chest portable   Final Result by Kristen Truong MD (10/01 1233)      Persistent extensive patchy bilateral interstitial alveolar consolidation likely slightly increased  Small right pleural effusion  Feeding tube  Workstation performed: [id number]         XR abdomen 1 view kub   Final Result by Cipriano Mederos MD (10/01 1100)      Feeding tube tip within the proximal stomach  Workstation performed: [id number]         XR abdomen 1 view kub   Final Result by Cipriano Mederos MD (10/01 1100)      Feeding tube tip within the proximal stomach  Workstation performed: [id number]         XR chest portable   Final Result by Sol Degroot MD (09/30 1916)         1  Feeding tube mid to distal esophagus  Patient has a subsequent follow-up abdominal image  2   Bilateral pulmonary opacities consistent with pneumonia or pulmonary edema unchanged since comparison  Workstation performed: [id number]         XR abdomen 1 view kub   Final Result by Tomi Felix MD (09/30 1918)      Advancement of feeding tube into the stomach with no acute findings  Workstation performed: [id number]         XR chest portable   Final Result by Lupe Jacobson MD (42/60 3044)      There is new bilateral central airspace opacities, left greater than right, which could be pulmonary edema or pneumonia  Workstation performed: [id number]         VAS lower limb venous duplex study, unilateral/limited   Final Result by Nancy Figueredo MD (09/25 2224)      MRI brain wo contrast   Final Result by [facility],  (09/23 1510)      No evidence of recent infarct  No mass effect or midline shift  Study performed in the limited fashion with extensive motion artifact  Workstation performed: [id number]         US right upper quadrant with liver dopplers   Final Result by Abigail Mike MD (09/20 1326)      1  Minimal layering sludge without evidence of acute cholecystitis  2   Normal-appearing liver with normal liver Dopplers  Workstation performed: [id number]         VAS lower limb venous duplex study, unilateral/limited   Final Result by Nancy Figueredo MD (09/19 2204)      CT lower extremity wo contrast right   Final Result by Lupe Jacobson MD (09/19 1535)      There is no evidence of intramuscular hematoma or other mass lesion in the right calf  Please note that rhabdomyolysis and compartment syndrome are clinical diagnoses, and are not excluded based on these imaging findings           Workstation performed: [id number]         CT chest abdomen pelvis wo contrast   Final Result by Heather Dumont MD (09/19 1301)      Bilateral groundglass opacities with superimposed inter and intralobular septal thickening  Differential diagnosis includes pulmonary hemorrhage, infection, and pulmonary edema, although the distribution is atypical for edema  The study was marked in [facility] for immediate notification  Workstation performed: [id number]         XR ankle 3+ views RIGHT   Final Result by Elenita Cartagena MD (09/19 1009)      No acute osseous abnormality  Workstation performed: [id number]         XR foot 3+ views RIGHT   Final Result by Elenita Cartagena MD (09/19 1009)      No acute osseous abnormality  Workstation performed: [id number]         XR hip/pelv 2-3 vws right   Final Result by Eleniat Cartagena MD (09/19 1007)      No acute osseous abnormality  Workstation performed: [id number]         XR chest 1 view   Final Result by Elenita Cartagena MD (09/19 1008)      No acute cardiopulmonary disease  Findings are stable            Workstation performed: [id number]         CT head without contrast   Final Result by Yasmany Castillo MD (09/19 3361)      No acute intracranial process  No skull fracture  Workstation performed: [id number]         CT spine cervical without contrast   Final Result by Yasmany Castillo MD (09/19 5991)      No cervical spine fracture or traumatic malalignment  Incidental finding of partially visualized subpleural groundglass opacity in the left pulmonary apex presumably scarring  Cannot exclude infiltrate  This study demonstrates a significant  finding and was documented as such in [facility] for liaison and referring practitioner notification  Workstation performed: [id number]           Rest all reviewed    Portions of the record may have been created with voice recognition software  Occasional wrong word or "sound a like" substitutions may have occurred due to the inherent limitations of voice recognition software   Read the chart carefully and recognize, using context, where substitutions have occurred  If you have any questions, please contact the dictating provider

## 2022-10-04 NOTE — SPEECH THERAPY NOTE
Speech Language/Pathology    Speech/Language Pathology Progress Note    Patient Name: Vidhya Pettit  Today's Date: 10/4/2022     Problem List  Principal Problem:    Encephalopathy acute  Active Problems:    Depression    Tobacco abuse    DDD (degenerative disc disease), lumbosacral    ARF (acute renal failure) (HCC)    Transaminitis    Abnormal CT of the chest    Traumatic rhabdomyolysis (HCC)    D-dimer, elevated    Leg edema, right    Localized swelling of right upper extremity    Bacteremia due to methicillin susceptible Staphylococcus aureus (MSSA)    Pulmonary edema    Aspiration pneumonitis (HCC)    Hypokalemia    Hypernatremia    Diarrhea       Past Medical History  Past Medical History:   Diagnosis Date   • Chronic pain disorder    • Depression    • GERD (gastroesophageal reflux disease)    • History of electroconvulsive therapy    • Low back pain    • Self-injurious behavior    • Suicide attempt [facility])         Past Surgical History  Past Surgical History:   Procedure Laterality Date   •  SECTION     • COLONOSCOPY     • PANCREAS SURGERY      "pseudocysts" per patient's  Ricki Infante   • [place] ESOPHAGOGASTRODUODENOSCOPY TRANSORAL DIAGNOSTIC N/A 4/10/2018    Procedure: EGD AND COLONOSCOPY;  Surgeon: Hazel Mejia MD;  [place] [facility]  GI LAB; Service: Gastroenterology         Subjective:  Pt more alert, slightly more verbal  She does not always respond to questions  Needed frequent repositioning  Tends to tilt head back often  keofed is out  She remains in wrist restraints  Internally and externally distracted  Turned tv off  Objective:  Seen at lunch  Ate all of the applesauce w/ the banana flakes mixed in it  Able to hold the cup to take straw sips independently  Took large multiple sips wnl w/ prompt swallow  No cough or throat clear  No decline in o2 sats  Pt on RA  Took one bite of prem cracker w/ slow mastication and transfer  Min delay in swallow response   trialed mixing some pieces in the vanilla magic cup, which she stated she liked  Prolonged oral manipulation, as the texture is a bit gummy, ate almost half of that w/ small pieces of prem cracker  Refused any more  Got the pt more juice, she drank another 2 ounces (total ~ 6-7)  No cough or difficulty   came in  I asked what she likes to eat, as I could not get an answer from the pt  He stated she eats almost anything  reoprted she likes mashed potatoes  I gave her some from her dish  She made a face and refused any more  He reported she does not like egg salad, would probably eat chopped turkey w/ gravy or tuna (gave choices)  Eyes closed by end of sesision  Assessment:  Some improvement noted since last seen by this SLP  Needs to be fed  Limit distractions  Plan/Recommendations:  Continue puree w/ thin  Will trial level 2 items for potential upgrade  Continue liquid supplements

## 2022-10-04 NOTE — ASSESSMENT & PLAN NOTE
· Acute encephalopathy secondary to gabapentin and morphine with subsequent renal failure, potentially component of psychiatric illness given history and bacteremia  · Consider hospital delirium due to prolong stay  · Received course of thiamine concern for Wernicke's encephalopathy  · LP and MRI negative for acute pathology, neurology evaluated  · Restarted gabapentin - given concern for withdrawal  · Discussed with neurology, patient likely will have slow improvement if any over the coming weeks  · Slight improvement in swallowing, patient removed feeding tube but has been tolerating dysphagia diet  · Speech therapy to continue follow-up

## 2022-10-04 NOTE — PLAN OF CARE
Problem: PAIN - ADULT  Goal: Verbalizes/displays adequate comfort level or baseline comfort level  Description: Interventions:  - Encourage patient to monitor pain and request assistance  - Assess pain using appropriate pain scale  - Administer analgesics based on type and severity of pain and evaluate response  - Implement non-pharmacological measures as appropriate and evaluate response  - Consider cultural and social influences on pain and pain management  - Notify physician/advanced practitioner if interventions unsuccessful or patient reports new pain  Outcome: Progressing     Problem: INFECTION - ADULT  Goal: Absence or prevention of progression during hospitalization  Description: INTERVENTIONS:  - Assess and monitor for signs and symptoms of infection  - Monitor lab/diagnostic results  - Monitor all insertion sites, i e  indwelling lines, tubes, and drains  - Monitor endotracheal if appropriate and nasal secretions for changes in amount and color  - [place] appropriate cooling/warming therapies per order  - Administer medications as ordered  - Instruct and encourage patient and family to use good hand hygiene technique  - Identify and instruct in appropriate isolation precautions for identified infection/condition  Outcome: Progressing     Problem: SAFETY ADULT  Goal: Patient will remain free of falls  Description: INTERVENTIONS:  - Educate patient/family on patient safety including physical limitations  - Instruct patient to call for assistance with activity   - Consult OT/PT to assist with strengthening/mobility   - Keep Call bell within reach  - Keep bed low and locked with side rails adjusted as appropriate  - Keep care items and personal belongings within reach  - Initiate and maintain comfort rounds  - Make Fall Risk Sign visible to staff  - Offer Toileting every 2 Hours, in advance of need  - Initiate/Maintain bed alarm  - Obtain necessary fall risk management equipment: walker  - Apply yellow socks and bracelet for high fall risk patients  - Consider moving patient to room near nurses station  Outcome: Progressing  Goal: Maintain or return to baseline ADL function  Description: INTERVENTIONS:  -  Assess patient's ability to carry out ADLs; assess patient's baseline for ADL function and identify physical deficits which impact ability to perform ADLs (bathing, care of mouth/teeth, toileting, grooming, dressing, etc )  - Assess/evaluate cause of self-care deficits   - Assess range of motion  - Assess patient's mobility; develop plan if impaired  - Assess patient's need for assistive devices and provide as appropriate  - Encourage maximum independence but intervene and supervise when necessary  - Involve family in performance of ADLs  - Assess for home care needs following discharge   - Consider OT consult to assist with ADL evaluation and planning for discharge  - Provide patient education as appropriate  Outcome: Progressing  Goal: Maintains/Returns to pre admission functional level  Description: INTERVENTIONS:  - Perform BMAT or MOVE assessment daily    - Set and communicate daily mobility goal to care team and patient/family/caregiver  - Collaborate with rehabilitation services on mobility goals if consulted  - Perform Range of Motion 4-6 times a day  - Reposition patient every 2 hours    - Dangle patient 3-4 times a day  - Stand patient 3 times a day  - Ambulate patient 3-4 times a day  - Out of bed for toileting  - Record patient progress and toleration of activity level   Outcome: Progressing     Problem: DISCHARGE PLANNING  Goal: Discharge to home or other facility with appropriate resources  Description: INTERVENTIONS:  - Identify barriers to discharge w/patient and caregiver  - Arrange for needed discharge resources and transportation as appropriate  - Identify discharge learning needs (meds, wound care, etc )  - Arrange for interpretive services to assist at discharge as needed  - Refer to Case Management Department for coordinating discharge planning if the patient needs post-hospital services based on physician/advanced practitioner order or complex needs related to functional status, cognitive ability, or social support system  Outcome: Progressing     Problem: Potential for Falls  Goal: Patient will remain free of falls  Description: INTERVENTIONS:  - Educate patient/family on patient safety including physical limitations  - Instruct patient to call for assistance with activity   - Consult OT/PT to assist with strengthening/mobility   - Keep Call bell within reach  - Keep bed low and locked with side rails adjusted as appropriate  - Keep care items and personal belongings within reach  - Initiate and maintain comfort rounds  - Make Fall Risk Sign visible to staff  - Offer Toileting every 2 Hours, in advance of need  - Initiate/Maintain bed alarm  - Obtain necessary fall risk management equipment: walker  - Apply yellow socks and bracelet for high fall risk patients  - Consider moving patient to room near nurses station  Outcome: Progressing     Problem: MOBILITY - ADULT  Goal: Maintain or return to baseline ADL function  Description: INTERVENTIONS:  -  Assess patient's ability to carry out ADLs; assess patient's baseline for ADL function and identify physical deficits which impact ability to perform ADLs (bathing, care of mouth/teeth, toileting, grooming, dressing, etc )  - Assess/evaluate cause of self-care deficits   - Assess range of motion  - Assess patient's mobility; develop plan if impaired  - Assess patient's need for assistive devices and provide as appropriate  - Encourage maximum independence but intervene and supervise when necessary  - Involve family in performance of ADLs  - Assess for home care needs following discharge   - Consider OT consult to assist with ADL evaluation and planning for discharge  - Provide patient education as appropriate  Outcome: Progressing  Goal: Maintains/Returns to pre admission functional level  Description: INTERVENTIONS:  - Perform BMAT or MOVE assessment daily    - Set and communicate daily mobility goal to care team and patient/family/caregiver  - Collaborate with rehabilitation services on mobility goals if consulted  - Perform Range of Motion 4-6 times a day  - Reposition patient every 2 hours    - Dangle patient 3-4 times a day  - Stand patient 3 times a day  - Ambulate patient 3-4 times a day  - Out of bed for toileting  - Record patient progress and toleration of activity level   Outcome: Progressing     Problem: Prexisting or High Potential for Compromised Skin Integrity  Goal: Skin integrity is maintained or improved  Description: INTERVENTIONS:  - Identify patients at risk for skin breakdown  - Assess and monitor skin integrity  - Assess and monitor nutrition and hydration status  - Monitor labs   - Assess for incontinence   - Turn and reposition patient  - Assist with mobility/ambulation  - Relieve pressure over bony prominences  - Avoid friction and shearing  - Provide appropriate hygiene as needed including keeping skin clean and dry  - Evaluate need for skin moisturizer/barrier cream  - Collaborate with interdisciplinary team   - Patient/family teaching  - Consider wound care consult   Outcome: Progressing

## 2022-10-04 NOTE — PROGRESS NOTES
[facility]  Progress Note - Gildardo Zurita 1970, 45 y o  female MRN: [id number]  Unit/Bed#: [address] 87 217-01 Encounter: [id number]  Primary Care Provider: Rosalind Angel MD   Date and time admitted to hospital: 9/19/2022  8:04 AM    * Toxic metabolic encephalopathy  Assessment & Plan  · Acute encephalopathy secondary to gabapentin and morphine with subsequent renal failure, potentially component of psychiatric illness given history and bacteremia  · Consider hospital delirium due to prolong stay  · Received course of thiamine concern for Wernicke's encephalopathy  · LP and MRI negative for acute pathology, neurology evaluated  · Restarted gabapentin - given concern for withdrawal  · Discussed with neurology, patient likely will have slow improvement if any over the coming weeks  · Slight improvement in swallowing, patient removed feeding tube but has been tolerating dysphagia diet  · Speech therapy to continue follow-up      Diarrhea  Assessment & Plan  Reports having multiple loose bowel movements  C diff negative  Continue p o  Vanc prophylaxis, will add Florastor  ID suspect likely antibiotic associated diarrhea    Bacteremia due to methicillin susceptible Staphylococcus aureus (MSSA)  Assessment & Plan  MSSA bacteremia, repeat cultures neg for greater than 72 hours  Echo with no vegetation  ID recommend CL  Continue Ancef, continue po vanc for prophylaxis  Cards recommend holding off CL until improvement in respiratory status and encephalopathy      D-dimer, elevated  Assessment & Plan  · Elevated D-dimer may be related to fall  · Lower extremity duplex negative for DVT  · Renal dysfunction cannot check CTA of chest but doubt PE    · Discontinued heparin infusion and transitioned to prophylaxis dose    Traumatic rhabdomyolysis [facility])  Assessment & Plan  · Secondary to fall, patient had been laying in bed for approximately 4 days and family members had thought she was taking a deep nap  · She was subsequently brought to the hospital and found to be in acute rhabdomyolysis with ANUJA  · Had been on multiple sedating medications including MS Contin as well as gabapentin, which likely were contributing to sedation  · Treated with IV fluids, CK levels trended down    ARF (acute renal failure) ([facility])  Assessment & Plan  · ANUJA/ATN secondary to rhabdomyolysis  No evidence of renal obstruction on imaging  · Nephrology following  · No emergent need for hemodialysis  · Renal function improving    Results from last 7 days   Lab Units 10/04/22  0444 10/03/22  0913 10/02/22  0505 10/01/22  0507 10/01/22  0145 09/30/22  0137 09/29/22  0513 09/28/22  0542   BUN mg/dL 34* 34* 36* 39* 41* 40* 41* 48*   CREATININE mg/dL 3 07* 3 73* 4 79* 5 78* 5 88* 6 37* 6 45* 6 94*   EGFR ml/min/1 73sq m 16 13 9 7 7 6 6 6       DDD (degenerative disc disease), lumbosacral  Assessment & Plan  · Lumbar radiculopathy with previously scheduled surgery now on hold  · Prior to admission was on gabapentin and morphine IR 15 mg  · Gabapentin resumed at lower dose, hold morphine due to concern for over sedation      Tobacco abuse  Assessment & Plan  · Nicotine patch ordered    Depression  Assessment & Plan  · Does have history of suicide attempt a currently does not show any signs of thoughts of self-harm  · Previously on Effexor, which patient has been refusing either spitting out medication or chewing  · Psychiatry consultation placed and patient case reviewed with on-call psychiatry  · Continue Seroquel 50 mg twice a day   · Zyprexa 5 mg by mouth every 6 hours PRN agitation        VTE Pharmacologic Prophylaxis:   Pharmacologic: Heparin  Mechanical VTE Prophylaxis in Place: Yes    Patient Centered Rounds: I have performed bedside rounds with nursing staff today      Discussions with Specialists or Other Care Team Provider: Nephro, ID    Education and Discussions with Family / Patient:  on phone    Time Spent for Care: 30 minutes  More than 50% of total time spent on counseling and coordination of care as described above  Current Length of Stay: 15 day(s)    Current Patient Status: Inpatient   Certification Statement: The patient will continue to require additional inpatient hospital stay due to IV abx, await renal recovery    Discharge Plan: pending    Code Status: Level 1 - Full Code      Subjective:   No events overnight  Continues to be confused requiring soft restraints  Tolerating dysphagia diet  Called and updated   Objective:     Vitals:   Temp (24hrs), Av 6 °F (36 4 °C), Min:97 °F (36 1 °C), Max:98 °F (36 7 °C)    Temp:  [97 °F (36 1 °C)-98 °F (36 7 °C)] 97 9 °F (36 6 °C)  HR:  [] 86  Resp:  [16-18] 18  BP: (117-146)/(77-91) 122/81  SpO2:  [92 %-98 %] 95 %  Body mass index is 18 33 kg/m²  Input and Output Summary (last 24 hours): Intake/Output Summary (Last 24 hours) at 10/4/2022 1629  Last data filed at 10/3/2022 1845  Gross per 24 hour   Intake --   Output 850 ml   Net -850 ml       Physical Exam:     Physical Exam  Vitals and nursing note reviewed  Constitutional:       General: She is not in acute distress  Appearance: She is not ill-appearing  HENT:      Head: Normocephalic and atraumatic  Eyes:      General: No scleral icterus  Conjunctiva/sclera: Conjunctivae normal    Cardiovascular:      Rate and Rhythm: Normal rate  Pulses: Normal pulses  Pulmonary:      Effort: Pulmonary effort is normal  No respiratory distress  Abdominal:      General: Bowel sounds are normal  There is no distension  Palpations: Abdomen is soft  Musculoskeletal:         General: No swelling  Right lower leg: No edema  Left lower leg: No edema  Skin:     General: Skin is warm and dry  Neurological:      Mental Status: She is disoriented  Psychiatric:         Behavior: Behavior is slowed  Behavior is cooperative  Cognition and Memory: Cognition is impaired  Judgment: Judgment is impulsive  Additional Data:     Labs:    Results from last 7 days   Lab Units 10/04/22  0444   WBC Thousand/uL 18 05*   HEMOGLOBIN g/dL 10 8*   HEMATOCRIT % 34 5*   PLATELETS Thousands/uL 320   NEUTROS PCT % 77*   LYMPHS PCT % 15   MONOS PCT % 5   EOS PCT % 1     Results from last 7 days   Lab Units 10/04/22  0444   SODIUM mmol/L 146   POTASSIUM mmol/L 2 9*   CHLORIDE mmol/L 102   CO2 mmol/L 37*   BUN mg/dL 34*   CREATININE mg/dL 3 07*   ANION GAP mmol/L 7   CALCIUM mg/dL 9 7   ALBUMIN g/dL 2 5*   TOTAL BILIRUBIN mg/dL 0 36   ALK PHOS U/L 78   ALT U/L 18   AST U/L 30   GLUCOSE RANDOM mg/dL 137         Results from last 7 days   Lab Units 09/30/22  0024   POC GLUCOSE mg/dl 126         Results from last 7 days   Lab Units 10/04/22  0444 10/01/22  0145 09/27/22  1834   LACTIC ACID mmol/L  --  1 3 1 5   PROCALCITONIN ng/ml 0 26*  --   --            * I Have Reviewed All Lab Data Listed Above  * Additional Pertinent Lab Tests Reviewed: Matilde 64 Admission Reviewed    Imaging:    CT chest abdomen pelvis wo contrast    Result Date: 9/19/2022  Impression: Bilateral groundglass opacities with superimposed inter and intralobular septal thickening  Differential diagnosis includes pulmonary hemorrhage, infection, and pulmonary edema, although the distribution is atypical for edema  The study was marked in [facility] for immediate notification  Workstation performed: [id number]     XR hip/pelv 2-3 vws right    Result Date: 9/19/2022  Impression: No acute osseous abnormality  Workstation performed: [id number]     XR ankle 3+ views RIGHT    Result Date: 9/19/2022  Impression: No acute osseous abnormality  Workstation performed: [id number]     XR foot 3+ views RIGHT    Result Date: 9/19/2022  Impression: No acute osseous abnormality  Workstation performed: [id number]     CT head without contrast    Result Date: 9/19/2022  Impression: No acute intracranial process   No skull fracture  Workstation performed: [id number]     CT spine cervical without contrast    Result Date: 9/19/2022  Impression: No cervical spine fracture or traumatic malalignment  Incidental finding of partially visualized subpleural groundglass opacity in the left pulmonary apex presumably scarring  Cannot exclude infiltrate  This study demonstrates a significant  finding and was documented as such [facility] for liaison and referring practitioner notification  Workstation performed: [id number]     MRI brain wo contrast    Result Date: 9/23/2022  Impression: No evidence of recent infarct  No mass effect or midline shift  Study performed in the limited fashion with extensive motion artifact  Workstation performed: [id number]     XR chest 1 view    Result Date: 9/19/2022  Impression: No acute cardiopulmonary disease  Findings are stable Workstation performed: [id number]     US right upper quadrant with liver dopplers    Result Date: 9/20/2022  Impression: 1  Minimal layering sludge without evidence of acute cholecystitis  2   Normal-appearing liver with normal liver Dopplers  Workstation performed: [id number]     CT lower extremity wo contrast right    Result Date: 9/19/2022  Impression: There is no evidence of intramuscular hematoma or other mass lesion in the right calf  Please note that rhabdomyolysis and compartment syndrome are clinical diagnoses, and are not excluded based on these imaging findings  Workstation performed: [id number]     Recent Cultures (last 7 days):     Results from last 7 days   Lab Units 10/03/22  1823 09/28/22  1145 09/28/22  1144   BLOOD CULTURE   --  No Growth After 5 Days  No Growth After 5 Days     C DIFF TOXIN B BY PCR  Negative  --   --        Last 24 Hours Medication List:   Current Facility-Administered Medications   Medication Dose Route Frequency Provider Last Rate   • acetaminophen  650 mg Oral Q6H PRN Kusum Graham DO     • ALPRAZolam  0 5 mg Oral HS PRN Jordyn Cardoso MD • amLODIPine  10 mg Oral Daily Jenny Hughes MD     • cefazolin  2,000 mg Intravenous Q12H John Serra MD 2,000 mg (10/04/22 1226)   • gabapentin  100 mg Oral TID Jayshree Serrano MD     • heparin (porcine)  5,000 Units Subcutaneous Replaced by [facility] Atul Adan, DO     • hydrALAZINE  5 mg Intravenous Q6H PRN Adline Pollen, DO     • HYDROmorphone  0 5 mg Intravenous Q4H PRN Lum Bread, DO     • levalbuterol  1 25 mg Nebulization Q4H PRN Adline Pollen, DO     • metoprolol tartrate  25 mg Oral Q12H [address] Inder Abbott MD     • VANDANA ANTIFUNGAL   Topical BID Adline Pollen, DO     • nicotine  1 patch Transdermal Daily Atul Adan, DO     • OLANZapine  5 mg Oral Q6H PRN Adline Pollen, DO     • ondansetron  4 mg Intravenous Q4H PRN Lum Bread, DO     • QUEtiapine  50 mg Oral BID Jayshree Serrano MD     • saccharomyces boulardii  250 mg Oral BID Jayshree Serrano MD     • sodium chloride  100 mL/hr Intravenous Continuous MERITPAZ MAHONEY  mL/hr (10/04/22 0900)   • vancomycin  125 mg Oral Q12H Ebonie Aden MD          Today, Patient Was Seen By: Jayshree Serrano    ** Please Note: Dictation voice to text software may have been used in the creation of this document   **

## 2022-10-04 NOTE — ASSESSMENT & PLAN NOTE
· ANUJA/ATN secondary to rhabdomyolysis    No evidence of renal obstruction on imaging  · Nephrology following  · No emergent need for hemodialysis  · Renal function improving    Results from last 7 days   Lab Units 10/04/22  0444 10/03/22  0913 10/02/22  0505 10/01/22  0507 10/01/22  0145 09/30/22  0137 09/29/22  0513 09/28/22  0542   BUN mg/dL 34* 34* 36* 39* 41* 40* 41* 48*   CREATININE mg/dL 3 07* 3 73* 4 79* 5 78* 5 88* 6 37* 6 45* 6 94*   EGFR ml/min/1 73sq m 16 13 9 7 7 6 6 6

## 2022-10-04 NOTE — ASSESSMENT & PLAN NOTE
· Secondary to fall, patient had been laying in bed for approximately 4 days and family members had thought she was taking a deep nap    · She was subsequently brought to the hospital and found to be in acute rhabdomyolysis with ANUJA  · Had been on multiple sedating medications including MS Contin as well as gabapentin, which likely were contributing to sedation  · Treated with IV fluids, CK levels trended down

## 2022-10-04 NOTE — ASSESSMENT & PLAN NOTE
MSSA bacteremia, repeat cultures neg for greater than 72 hours  Echo with no vegetation  ID recommend CHERYLE  Continue Ancef, continue po vanc for prophylaxis  Cards recommend holding off CHERYLE until improvement in respiratory status and encephalopathy

## 2022-10-04 NOTE — TELEPHONE ENCOUNTER
I called pt  to give him your response and he was very appreciative for you checking into this matter  Pt  states staff has been a little more informative but are just stating she is suffering from delirium and pt  feels her psych meds are being given to much  Ricki states her swelling has come down as well as her lab numbers and that they took her feeding tube out  He states she has been out of it for 2 days and staff states it is due to a nutritional deficiency  I let the pt know you would be reaching out after you check into things

## 2022-10-04 NOTE — PHYSICAL THERAPY NOTE
Physical Therapy Treatment Note     10/04/22 1421   PT Last Visit   PT Visit Date 10/04/22   Note Type   Note Type Treatment   Pain Assessment   Pain Assessment Tool FLACC   Pain Score No Pain   Restrictions/Precautions   Weight Bearing Precautions Per Order No   Other Precautions Cognitive; Bed Alarm; Restraints; Fall Risk;Telemetry;Multiple lines; Impulsive   General   Chart Reviewed Yes   Subjective   Subjective pt  in bed upon entry  Noted slightly more fatigued and with decreased cognition than yesterday  Bed Mobility   Rolling R 4  Minimal assistance   Additional items Assist x 1; Increased time required;Verbal cues; Bedrails;LE management   Rolling L 4  Minimal assistance   Additional items Assist x 1; Increased time required;LE management;Verbal cues; Bedrails   Supine to Sit 4  Minimal assistance   Additional items Assist x 1; Increased time required;LE management;Verbal cues;HOB elevated   Sit to Supine 4  Minimal assistance   Additional items Assist x 1; Increased time required;LE management;Verbal cues   Transfers   Sit to Stand 4  Minimal assistance   Additional items Assist x 1; Increased time required;Verbal cues; Assist x 2  (2nd A mainly for safety)   Stand to Sit 4  Minimal assistance   Additional items Assist x 1; Increased time required;Verbal cues; Assist x 2  (2nd A mainly for safety)   Additional Comments Repepated STS transfers at the EOB with cues   Balance   Static Sitting Fair   Dynamic Sitting Fair -   Static Standing Poor +   Dynamic Standing Poor   Ambulatory Poor -   Endurance Deficit   Endurance Deficit No   Activity Tolerance   Activity Tolerance Patient tolerated treatment well   Nurse Made Aware Yes   Assessment   Prognosis Guarded   Problem List Decreased strength; Impaired balance;Decreased mobility; Decreased coordination;Decreased cognition; Impaired judgement;Decreased safety awareness;Decreased skin integrity   Assessment Pt  able to follow commands inconsistently   Pt  responds appropriately with 1 step commands and given increased time to process  Responds better to tactile cues to assist with muscle memory for mobility  Pt  able to maintain sitting at EOB  10 min unsupported with CS  Pt  also able to use her UEs to take sips from her cup  Pt  also able to keep her eyes open better when seated at EOB  Min A x1 -2 for all transfers  2nd A mainly for safety and patient's impaired cognition  pt  able to respond with words as session progressed  Pt  continues with the inability to weight bear through RLE  pt  also unable to take steps forward or laterally when cued  Pt  reported no pain when asked by nodding her head  Pt  positioned back in bed post session with restraints on and s/l on her L  Bed alarm engaged before exiting the room  pt  was soiled of BM upon entry and needed total assist for pericare  Will continue to follow during stay to maximize functional mobility  Impaired cognition is a limiting factor to patient's mobility  Will continue per PT POC  Barriers to Discharge None   Goals   Patient Goals None reported   STG Expiration Date 10/14/22   PT Treatment Day 6   Plan   Treatment/Interventions Functional transfer training;Bed mobility;Spoke to nursing;OT;Patient/family training   Progress Slow progress, cognitive deficits   PT Frequency 3-5x/wk   Recommendation   PT Discharge Recommendation Post acute rehabilitation services   Equipment Recommended Other (Comment)  (TBD)   AM-PAC Basic Mobility Inpatient   Turning in Bed Without Bedrails 3   Lying on Back to Sitting on Edge of Flat Bed 3   Moving Bed to Chair 2   Standing Up From Chair 3   Walk in Room 2   Climb 3-5 Stairs 1   Basic Mobility Inpatient Raw Score 14   Basic Mobility Standardized Score 35 55   Highest Level Of Mobility   JH-HLM Goal 4: Move to chair/commode   JH-HLM Achieved 3: Sit at edge of bed   End of Consult   Patient Position at End of Consult Supine; All needs within reach;Bed/Chair alarm activated Damon Pang    An AM-PAC basic mobility standardized score less than 42 9 suggest the patient may benefit from discharge to post-acute rehab services

## 2022-10-04 NOTE — TELEPHONE ENCOUNTER
I did speak with the internist in charge of her this week  He told me that he would call the  directly to review the case  Could you please call the  and let him know this, so that he would answer the call instead of it going to voice mail  Dr Terrence Portillo told me that he did try yesterday as well, but that doing the call went directly to voicemail

## 2022-10-04 NOTE — PLAN OF CARE
Problem: PAIN - ADULT  Goal: Verbalizes/displays adequate comfort level or baseline comfort level  Description: Interventions:  - Encourage patient to monitor pain and request assistance  - Assess pain using appropriate pain scale  - Administer analgesics based on type and severity of pain and evaluate response  - Implement non-pharmacological measures as appropriate and evaluate response  - Consider cultural and social influences on pain and pain management  - Notify physician/advanced practitioner if interventions unsuccessful or patient reports new pain  Outcome: Progressing     Problem: INFECTION - ADULT  Goal: Absence or prevention of progression during hospitalization  Description: INTERVENTIONS:  - Assess and monitor for signs and symptoms of infection  - Monitor lab/diagnostic results  - Monitor all insertion sites, i e  indwelling lines, tubes, and drains  - Monitor endotracheal if appropriate and nasal secretions for changes in amount and color  - [place] appropriate cooling/warming therapies per order  - Administer medications as ordered  - Instruct and encourage patient and family to use good hand hygiene technique  - Identify and instruct in appropriate isolation precautions for identified infection/condition  Outcome: Progressing     Problem: SAFETY ADULT  Goal: Patient will remain free of falls  Description: INTERVENTIONS:  - Educate patient/family on patient safety including physical limitations  - Instruct patient to call for assistance with activity   - Consult OT/PT to assist with strengthening/mobility   - Keep Call bell within reach  - Keep bed low and locked with side rails adjusted as appropriate  - Keep care items and personal belongings within reach  - Initiate and maintain comfort rounds  - Make Fall Risk Sign visible to staff  - Offer Toileting every 2 Hours, in advance of need  - Initiate/Maintain 2alarm  - Obtain necessary fall risk management equipment: 2  - Apply yellow socks and bracelet for high fall risk patients  - Consider moving patient to room near nurses station  Outcome: Progressing  Goal: Maintain or return to baseline ADL function  Description: INTERVENTIONS:  -  Assess patient's ability to carry out ADLs; assess patient's baseline for ADL function and identify physical deficits which impact ability to perform ADLs (bathing, care of mouth/teeth, toileting, grooming, dressing, etc )  - Assess/evaluate cause of self-care deficits   - Assess range of motion  - Assess patient's mobility; develop plan if impaired  - Assess patient's need for assistive devices and provide as appropriate  - Encourage maximum independence but intervene and supervise when necessary  - Involve family in performance of ADLs  - Assess for home care needs following discharge   - Consider OT consult to assist with ADL evaluation and planning for discharge  - Provide patient education as appropriate  Outcome: Progressing  Goal: Maintains/Returns to pre admission functional level  Description: INTERVENTIONS:  - Perform BMAT or MOVE assessment daily    - Set and communicate daily mobility goal to care team and patient/family/caregiver  - Collaborate with rehabilitation services on mobility goals if consulted  - Perform Range of Motion 2 times a day  - Reposition patient every 2 hours    - Dangle patient 2 times a day  - Stand patient 2 times a day  - Ambulate patient 2 times a day  - Out of bed to chair 2 times a day   - Out of bed for meals 2 times a day  - Out of bed for toileting  - Record patient progress and toleration of activity level   Outcome: Progressing     Problem: DISCHARGE PLANNING  Goal: Discharge to home or other facility with appropriate resources  Description: INTERVENTIONS:  - Identify barriers to discharge w/patient and caregiver  - Arrange for needed discharge resources and transportation as appropriate  - Identify discharge learning needs (meds, wound care, etc )  - Arrange for interpretive services to assist at discharge as needed  - Refer to Case Management Department for coordinating discharge planning if the patient needs post-hospital services based on physician/advanced practitioner order or complex needs related to functional status, cognitive ability, or social support system  Outcome: Progressing     Problem: Knowledge Deficit  Goal: Patient/family/caregiver demonstrates understanding of disease process, treatment plan, medications, and discharge instructions  Description: Complete learning assessment and assess knowledge base    Interventions:  - Provide teaching at level of understanding  - Provide teaching via preferred learning methods  Outcome: Progressing     Problem: Potential for Falls  Goal: Patient will remain free of falls  Description: INTERVENTIONS:  - Educate patient/family on patient safety including physical limitations  - Instruct patient to call for assistance with activity   - Consult OT/PT to assist with strengthening/mobility   - Keep Call bell within reach  - Keep bed low and locked with side rails adjusted as appropriate  - Keep care items and personal belongings within reach  - Initiate and maintain comfort rounds  - Make Fall Risk Sign visible to staff  - Offer Toileting every 2 Hours, in advance of need  - Initiate/Maintain 2alarm  - Obtain necessary fall risk management equipment: 2  - Apply yellow socks and bracelet for high fall risk patients  - Consider moving patient to room near nurses station  Outcome: Progressing     Problem: MOBILITY - ADULT  Goal: Maintain or return to baseline ADL function  Description: INTERVENTIONS:  -  Assess patient's ability to carry out ADLs; assess patient's baseline for ADL function and identify physical deficits which impact ability to perform ADLs (bathing, care of mouth/teeth, toileting, grooming, dressing, etc )  - Assess/evaluate cause of self-care deficits   - Assess range of motion  - Assess patient's mobility; develop plan if impaired  - Assess patient's need for assistive devices and provide as appropriate  - Encourage maximum independence but intervene and supervise when necessary  - Involve family in performance of ADLs  - Assess for home care needs following discharge   - Consider OT consult to assist with ADL evaluation and planning for discharge  - Provide patient education as appropriate  Outcome: Progressing  Goal: Maintains/Returns to pre admission functional level  Description: INTERVENTIONS:  - Perform BMAT or MOVE assessment daily    - Set and communicate daily mobility goal to care team and patient/family/caregiver  - Collaborate with rehabilitation services on mobility goals if consulted  - Perform Range of Motion 2 times a day  - Reposition patient every 2 hours    - Dangle patient 2 times a day  - Stand patient 2 times a day  - Ambulate patient 2 times a day  - Out of bed to chair 2 times a day   - Out of bed for meals 2 times a day  - Out of bed for toileting  - Record patient progress and toleration of activity level   Outcome: Progressing     Problem: Prexisting or High Potential for Compromised Skin Integrity  Goal: Skin integrity is maintained or improved  Description: INTERVENTIONS:  - Identify patients at risk for skin breakdown  - Assess and monitor skin integrity  - Assess and monitor nutrition and hydration status  - Monitor labs   - Assess for incontinence   - Turn and reposition patient  - Assist with mobility/ambulation  - Relieve pressure over bony prominences  - Avoid friction and shearing  - Provide appropriate hygiene as needed including keeping skin clean and dry  - Evaluate need for skin moisturizer/barrier cream  - Collaborate with interdisciplinary team   - Patient/family teaching  - Consider wound care consult   Outcome: Progressing     Problem: Nutrition/Hydration-ADULT  Goal: Nutrient/Hydration intake appropriate for improving, restoring or maintaining nutritional needs  Description: Monitor and assess patient's nutrition/hydration status for malnutrition  Collaborate with interdisciplinary team and initiate plan and interventions as ordered  Monitor patient's weight and dietary intake as ordered or per policy  Utilize nutrition screening tool and intervene as necessary  Determine patient's food preferences and provide high-protein, high-caloric foods as appropriate  INTERVENTIONS:  - Monitor oral intake, urinary output, labs, and treatment plans  - Assess nutrition and hydration status and recommend course of action  - Evaluate amount of meals eaten  - Assist patient with eating if necessary   - Allow adequate time for meals  - Recommend/ encourage appropriate diets, oral nutritional supplements, and vitamin/mineral supplements  - Order, calculate, and assess calorie counts as needed  - Recommend, monitor, and adjust tube feedings and TPN/PPN based on assessed needs  - Assess need for intravenous fluids  - Provide specific nutrition/hydration education as appropriate  - Include patient/family/caregiver in decisions related to nutrition  Outcome: Progressing     Problem: NEUROSENSORY - ADULT  Goal: Achieves maximal functionality and self care  Description: INTERVENTIONS  - Monitor swallowing and airway patency with patient fatigue and changes in neurological status  - Encourage and assist patient to increase activity and self care     - Encourage visually impaired, hearing impaired and aphasic patients to use assistive/communication devices  Outcome: Progressing     Problem: CARDIOVASCULAR - ADULT  Goal: Maintains optimal cardiac output and hemodynamic stability  Description: INTERVENTIONS:  - Monitor I/O, vital signs and rhythm  - Monitor for S/S and trends of decreased cardiac output  - Administer and titrate ordered vasoactive medications to optimize hemodynamic stability  - Assess quality of pulses, skin color and temperature  - Assess for signs of decreased coronary artery perfusion  - Instruct patient to report change in severity of symptoms  Outcome: Progressing     Problem: RESPIRATORY - ADULT  Goal: Achieves optimal ventilation and oxygenation  Description: INTERVENTIONS:  - Assess for changes in respiratory status  - Assess for changes in mentation and behavior  - Position to facilitate oxygenation and minimize respiratory effort  - Oxygen administered by appropriate delivery if ordered  - Initiate smoking cessation education as indicated  - Encourage broncho-pulmonary hygiene including cough, deep breathe, Incentive Spirometry  - Assess the need for suctioning and aspirate as needed  - Assess and instruct to report SOB or any respiratory difficulty  - Respiratory Therapy support as indicated  Outcome: Progressing     Problem: GASTROINTESTINAL - ADULT  Goal: Maintains adequate nutritional intake  Description: INTERVENTIONS:  - Monitor percentage of each meal consumed  - Identify factors contributing to decreased intake, treat as appropriate  - Assist with meals as needed  - Monitor I&O, weight, and lab values if indicated  - Obtain nutrition services referral as needed  Outcome: Progressing     Problem: METABOLIC, FLUID AND ELECTROLYTES - ADULT  Goal: Electrolytes maintained within normal limits  Description: INTERVENTIONS:  - Monitor labs and assess patient for signs and symptoms of electrolyte imbalances  - Administer electrolyte replacement as ordered  - Monitor response to electrolyte replacements, including repeat lab results as appropriate  - Instruct patient on fluid and nutrition as appropriate  Outcome: Progressing  Goal: Fluid balance maintained  Description: INTERVENTIONS:  - Monitor labs   - Monitor I/O and WT  - Instruct patient on fluid and nutrition as appropriate  - Assess for signs & symptoms of volume excess or deficit  Outcome: Progressing     Problem: SKIN/TISSUE INTEGRITY - ADULT  Goal: Skin Integrity remains intact(Skin Breakdown Prevention)  Description: Assess:  -Perform Erickson assessment every 2  -Clean and moisturize skin every 2  -Inspect skin when repositioning, toileting, and assisting with ADLS  -Assess under medical devices such as 2 every 2  -Assess extremities for adequate circulation and sensation     Bed Management:  -Have minimal linens on bed & keep smooth, unwrinkled  -Change linens as needed when moist or perspiring  -Avoid sitting or lying in one position for more than 2 hours while in bed  -Keep HOB at 2degrees     Toileting:  -Offer bedside commode  -Assess for incontinence every 2  -Use incontinent care products after each incontinent episode such as 2    Activity:  -Mobilize patient 2 times a day  -Encourage activity and walks on unit  -Encourage or provide ROM exercises   -Turn and reposition patient every 2 Hours  -Use appropriate equipment to lift or move patient in bed  -Instruct/ Assist with weight shifting every 2 when out of bed in chair  -Consider limitation of chair time 2 hour intervals    Skin Care:  -Avoid use of baby powder, tape, friction and shearing, hot water or constrictive clothing  -Relieve pressure over bony prominences using 2  -Do not massage red bony areas    Next Steps:  -Teach patient strategies to minimize risks such as 2   -Consider consults to  interdisciplinary teams such as 2  Outcome: Progressing  Goal: Incision(s), wounds(s) or drain site(s) healing without S/S of infection  Description: INTERVENTIONS  - Assess and document dressing, incision, wound bed, drain sites and surrounding tissue  - Provide patient and family education  - Perform skin care/dressing changes every 2  Outcome: Progressing     Problem: MUSCULOSKELETAL - ADULT  Goal: Maintain or return mobility to safest level of function  Description: INTERVENTIONS:  - Assess patient's ability to carry out ADLs; assess patient's baseline for ADL function and identify physical deficits which impact ability to perform ADLs (bathing, care of mouth/teeth, toileting, grooming, dressing, etc )  - Assess/evaluate cause of self-care deficits   - Assess range of motion  - Assess patient's mobility  - Assess patient's need for assistive devices and provide as appropriate  - Encourage maximum independence but intervene and supervise when necessary  - Involve family in performance of ADLs  - Assess for home care needs following discharge   - Consider OT consult to assist with ADL evaluation and planning for discharge  - Provide patient education as appropriate  Outcome: Progressing

## 2022-10-04 NOTE — ASSESSMENT & PLAN NOTE
Reports having multiple loose bowel movements  C diff negative  Continue p o   Vanc prophylaxis, will add Florastor  ID suspect likely antibiotic associated diarrhea

## 2022-10-04 NOTE — PLAN OF CARE
Problem: PHYSICAL THERAPY ADULT  Goal: Performs mobility at highest level of function for planned discharge setting  See evaluation for individualized goals  Description: Treatment/Interventions: Functional transfer training, LE strengthening/ROM, Elevations, Therapeutic exercise, Cognitive reorientation, Patient/family training, Equipment eval/education, Bed mobility, Gait training, Compensatory technique education, Continued evaluation, Spoke to nursing, Spoke to case management, Spoke to MD, OT, ST  Equipment Recommended: Josie Greene (if patient does not own)       See flowsheet documentation for full assessment, interventions and recommendations  Outcome: Progressing  Note: Prognosis: Guarded  Problem List: Decreased strength, Impaired balance, Decreased mobility, Decreased coordination, Decreased cognition, Impaired judgement, Decreased safety awareness, Decreased skin integrity  Assessment: Pt  able to follow commands inconsistently  Pt  responds appropriately with 1 step commands and given increased time to process  Responds better to tactile cues to assist with muscle memory for mobility  Pt  able to maintain sitting at EOB  10 min unsupported with CS  Pt  also able to use her UEs to take sips from her cup  Pt  also able to keep her eyes open better when seated at EOB  Min A x1 -2 for all transfers  2nd A mainly for safety and patient's impaired cognition  pt  able to respond with words as session progressed  Pt  continues with the inability to weight bear through RLE  pt  also unable to take steps forward or laterally when cued  Pt  reported no pain when asked by nodding her head  Pt  positioned back in bed post session with restraints on and s/l on her L  Bed alarm engaged before exiting the room  pt  was soiled of BM upon entry and needed total assist for pericare  Will continue to follow during stay to maximize functional mobility  Impaired cognition is a limiting factor to patient's mobility   Will continue per PT POC  Barriers to Discharge: None  Barriers to Discharge Comments: unable to identify at this time  PT Discharge Recommendation: Post acute rehabilitation services    See flowsheet documentation for full assessment

## 2022-10-04 NOTE — PLAN OF CARE
Problem: OCCUPATIONAL THERAPY ADULT  Goal: Performs self-care activities at highest level of function for planned discharge setting  See evaluation for individualized goals  Description: Treatment Interventions: ADL retraining, Functional transfer training, UE strengthening/ROM, Endurance training, Cognitive reorientation, Patient/family training, Equipment evaluation/education, Compensatory technique education, Continued evaluation          See flowsheet documentation for full assessment, interventions and recommendations  Note: Limitation: Decreased ADL status, Decreased Safe judgement during ADL, Decreased cognition, Decreased endurance, Decreased high-level ADLs     Assessment: Pt seen for 38 min tx session with focus on functional balance, functional mobility, ADL status, transfer safety, cognition, and activity tolerance  Pt was able tolerate OOB mobility; sitting balance=f/f-, standing balance=f-/p+  Pt required verbal/physical cues to maintain transfer safety  Pt required heavy assistance with her UE and LE ADLs  Cognitive deficits again noted--orientation, memory, problem-solving, judgement/safety, direction-following  Pt more alert(i e eyes open) with tx session, but remains limited with direction-following  Pt able to demonstrate good b/l UE AROM  Pt continues to demonstrate appropriateness for inpt rehab to improve his overall level of independence  Goals stated on initial eval remain appropriate  Will extend goal date       OT Discharge Recommendation: Post acute rehabilitation services

## 2022-10-04 NOTE — PROGRESS NOTES
Progress Note - Infectious Disease   Caryle Harsh 45 y o  female MRN: [id number]  Unit/Bed#: [facility] 2 -01 Encounter: [id number]      Impression/Plan:  1  SIRS vs Sepsis, not present on admission, a/e/b fever, tachycardia  Mild leukocytosis   Suspect this is likely due to Gram-positive bacteremia  No recurrent high fever spike  No lactic acidosis   COVID-19 negative   UA benign   Status post lumbar puncture for altered mental status with negative ME panel    -antibiotics as below   -follow-up cultures and adjust antibiotics as needed  -monitor temperature and hemodynamics  -recheck CBC and BMP in a m   -recheck procalcitonin level     2  MSSA bacteremia  2 of 2 admission blood cultures postive for MSSA  Patient was found down for prolonged period of time with evidence of multiple wounds on admission  Suspect cutaneous vs endovascular source, likely phlebitis as there is report of RUE erythema surrounding prior IV in addition to palpable cord in left antecub  TTE negative for obvious vegetation  Repeat blood cultures x 2 sets are negative at 24h  CHERYLE planned today now on hold due to tenuous respiratory status  Repeat transthoracic echocardiogram limited but negative for valvular vegetation  -continue cefazolin  -recommend CHERYLE if able to safely do  -plan 2-4 week course of IV antibiotics     3  Toxic Metabolic encephalopathy   Likely multifactorial etiology with uremia playing a role   This was initially thought to be secondary to gabapentin and morphine use in the setting of renal failure, hypotension, rhabdomyolysis   MRI brain negative   Lumbar puncture with opening pressure 26 with negative ME panel  CSF fluid culture not collected  The most likely to be secondary to metabolic issues, medication effect, and possible Wernicke's encephalopathy    Psychiatric issues also likely playing a role  -monitor cognition  -treat metabolic issues and psychiatric issues  -no additional ID workup needed     4  Acute renal failure   Creatinine on admission 5 88 with CK over 32,000  Creatinine reached plateau, now down [id number] 4 4 today   Likely multifactorial etiology in the setting of rhabdomyolysis and dehydration  Likely ANUJA/ATN   The renal function now continues to improve and the creatinine clearance remains above 11  -renally dose antibiotics as above  -close Nephrology follow-up  -recheck BMP daily     5  Tobacco abuse   Encourage cessation as recommended by primary team      6  Antibiotic allergy  Hx of hives with PCNs  Tolerated ceftriaxone and now tolerating cefazolin  Monitor for MUSA      7  Acute hypoxic respiratory failure-on oxygen by nasal cannula  Her saturation seem okay off oxygen  CT scan suggest new areas of ground-glass opacification of unclear significance  Possible aspiration pneumonitis versus pneumonia  Procalcitonin level is relatively low  -monitor respiratory status  -wean off oxygen as able  -recheck procalcitonin level    8  Diarrhea-possibly all antibiotic related  Consideration for the possibility of C difficile colitis  -follow up stool for C diff  -increase vancomycin 125 mg p o  Q 6 hours while awaiting test results  -monitor stool output    Discussed the above management plan with the primary service    Antibiotics:  Cefazolin 8    Subjective:  Patient has no fever, chills, sweats; no nausea, vomiting, but has now developed some diarrhea; no cough, shortness of breath; no pain  No new symptoms  Remains confused  Objective:  Vitals:  Temp:  [97 °F (36 1 °C)-98 2 °F (36 8 °C)] 98 °F (36 7 °C)  HR:  [] 110  Resp:  [16-18] 18  BP: (115-146)/(76-91) 146/91  SpO2:  [92 %-95 %] 94 %  Temp (24hrs), Av 7 °F (36 5 °C), Min:97 °F (36 1 °C), Max:98 2 °F (36 8 °C)  Current: Temperature: 98 °F (36 7 °C)    Physical Exam:   General Appearance:  Alert, interactive, nontoxic, no acute distress  Throat: Oropharynx moist without lesions      Lungs:   Clear to auscultation bilaterally; no wheezes, rhonchi or rales; respirations unlabored   Heart:  Tachycardic; no murmur, rub or gallop   Abdomen:   Soft, non-tender, non-distended, positive bowel sounds  Extremities: No clubbing, cyanosis or edema   Skin: No new rashes or lesions  No draining wounds noted  Labs, Imaging, & Other studies:   All pertinent labs and imaging studies were personally reviewed  Results from last 7 days   Lab Units 10/04/22  0444 10/03/22  0507 10/02/22  0505   WBC Thousand/uL 18 05* 20 32* 15 05*   HEMOGLOBIN g/dL 10 8* 12 2 12 2   PLATELETS Thousands/uL 320 341 339     Results from last 7 days   Lab Units 10/04/22  0444 10/03/22  0913 10/02/22  0505 10/01/22  0507 10/01/22  0145   SODIUM mmol/L 146 147 148* 144 146   POTASSIUM mmol/L 2 9* 3 6 3 1* 3 2* 3 1*   CHLORIDE mmol/L 102 100 102 102 104   CO2 mmol/L 37* 35* 38* 33* 34*   BUN mg/dL 34* 34* 36* 39* 41*   CREATININE mg/dL 3 07* 3 73* 4 79* 5 78* 5 88*   EGFR ml/min/1 73sq m 16 13 9 7 7   CALCIUM mg/dL 9 7 9 7 9 8 9 0 9 3   AST U/L 30  --   --  56* 66*   ALT U/L 18  --   --  47 45   ALK PHOS U/L 78  --   --  76 91     Results from last 7 days   Lab Units 09/28/22  1145 09/28/22  1144   BLOOD CULTURE  No Growth After 5 Days  No Growth After 5 Days       Results from last 7 days   Lab Units 10/04/22  0444   PROCALCITONIN ng/ml 0 26*

## 2022-10-04 NOTE — OCCUPATIONAL THERAPY NOTE
Occupational Therapy Progress Note(aenq=[id number])     Patient Name: Gretchen Simmons  Today's Date: 10/4/2022  Problem List  Principal Problem:    Encephalopathy acute  Active Problems:    Depression    Tobacco abuse    DDD (degenerative disc disease), lumbosacral    ARF (acute renal failure) (HCC)    Transaminitis    Abnormal CT of the chest    Traumatic rhabdomyolysis (HCC)    D-dimer, elevated    Leg edema, right    Localized swelling of right upper extremity    Bacteremia due to methicillin susceptible Staphylococcus aureus (MSSA)    Pulmonary edema    Aspiration pneumonitis (HCC)    Hypokalemia    Hypernatremia    Diarrhea            10/04/22 1345   Note Type   Note Type Treatment   Restrictions/Precautions   Weight Bearing Precautions Per Order No   Other Precautions Cognitive; Chair Alarm; Bed Alarm;Multiple lines; Fall Risk   Pain Assessment   Pain Assessment Tool FLACC   Pain Rating: FLACC (Rest) - Face 0   Pain Rating: FLACC (Rest) - Legs 0   Pain Rating: FLACC (Rest) - Activity 0   Pain Rating: FLACC (Rest) - Cry 0   Pain Rating: FLACC (Rest) - Consolability 0   Score: FLACC (Rest) 0   ADL   Where Assessed Supine, bed   Eating Assistance 5  Supervision/Setup   LB Bathing Assistance 1  Total Assistance   LB Bathing Deficit Increased time to complete;Perineal area; Buttocks;Right upper leg;Left upper leg   UB Dressing Assistance 1  Total Assistance   UB Dressing Deficit Thread RUE; Thread LUE;Pull around back;Pull down in back   LB Dressing Assistance 1  Total Assistance   LB Dressing Deficit Don/doff R sock; Don/doff L sock   Toileting Assistance  1  Total Assistance   Toileting Deficit Clothing management up;Clothing management down;Perineal hygiene   Functional Standing Tolerance   Time 1-2mins x 2   Bed Mobility   Rolling R 4  Minimal assistance   Additional items Assist x 1; Increased time required;Verbal cues;LE management   Rolling L 4  Minimal assistance   Additional items Assist x 1; Increased time required;Verbal cues;LE management   Supine to Sit 4  Minimal assistance   Additional items Assist x 1; Increased time required;Verbal cues;LE management   Sit to Supine 4  Minimal assistance   Additional items Assist x 1; Increased time required;Verbal cues;LE management   Transfers   Sit to Stand 4  Minimal assistance   Additional items Assist x 1; Increased time required;Verbal cues   Stand to Sit 4  Minimal assistance   Additional items Assist x 1; Increased time required;Verbal cues   Functional Mobility   Functional Mobility 4  Minimal assistance   Additional Comments x1   Additional items Hand hold assistance   Cognition   Overall Cognitive Status Impaired   Arousal/Participation Arousable   Attention Difficulty attending to directions   Orientation Level Disoriented to place; Disoriented to time;Disoriented to situation  (limited verbalization)   Memory Decreased short term memory;Decreased recall of recent events;Decreased recall of precautions   Following Commands Follows one step commands inconsistently   Activity Tolerance   Activity Tolerance Patient limited by fatigue   Medical Staff Made Aware nsg, P T  Assessment   Assessment Pt seen for 38 min tx session with focus on functional balance, functional mobility, ADL status, transfer safety, cognition, and activity tolerance  Pt was able tolerate OOB mobility; sitting balance=f/f-, standing balance=f-/p+  Pt required verbal/physical cues to maintain transfer safety  Pt required heavy assistance with her UE and LE ADLs  Cognitive deficits again noted--orientation, memory, problem-solving, judgement/safety, direction-following  Pt more alert(i e eyes open) with tx session, but remains limited with direction-following  Pt able to demonstrate good b/l UE AROM  Pt continues to demonstrate appropriateness for inpt rehab to improve his overall level of independence  Goals stated on initial eval remain appropriate  Will extend goal date     Plan   Treatment Interventions ADL retraining;Functional transfer training;UE strengthening/ROM; Endurance training;Cognitive reorientation;Patient/family training;Equipment evaluation/education; Compensatory technique education;Continued evaluation   Goal Expiration Date 10/18/22   OT Treatment Day 4   OT Frequency 3-5x/wk   Recommendation   OT Discharge Recommendation Post acute rehabilitation services   AM-PAC Daily Activity Inpatient   Lower Body Dressing 1   Bathing 1   Toileting 1   Upper Body Dressing 1   Grooming 1   Eating 2   Daily Activity Raw Score 7   Turning Head Towards Sound 2   Follow Simple Instructions 2   Low Function Daily Activity Raw Score 11   Low Function Daily Activity Standardized Score 19 45   AM-PAC Applied Cognition Inpatient   Following a Speech/Presentation 2   Understanding Ordinary Conversation 2   Taking Medications 1   Remembering Where Things Are Placed or Put Away 1   Remembering List of 4-5 Errands 1   Taking Care of Complicated Tasks 1   Applied Cognition Raw Score 8   Applied Cognition Standardized Score 19 32   [facility]

## 2022-10-05 ENCOUNTER — APPOINTMENT (OUTPATIENT)
Dept: RADIOLOGY | Facility: HOSPITAL | Age: 52
End: 2022-10-05

## 2022-10-05 LAB
ANION GAP SERPL CALCULATED.3IONS-SCNC: 5 MMOL/L (ref 4–13)
ANION GAP SERPL CALCULATED.3IONS-SCNC: 7 MMOL/L (ref 4–13)
BASOPHILS # BLD AUTO: 0.08 THOUSANDS/ÂΜL (ref 0–0.1)
BASOPHILS NFR BLD AUTO: 1 % (ref 0–1)
BUN SERPL-MCNC: 30 MG/DL (ref 5–25)
BUN SERPL-MCNC: 8 MG/DL (ref 5–25)
CALCIUM SERPL-MCNC: 9 MG/DL (ref 8.3–10.1)
CALCIUM SERPL-MCNC: 9.9 MG/DL (ref 8.3–10.1)
CHLORIDE SERPL-SCNC: 105 MMOL/L (ref 96–108)
CHLORIDE SERPL-SCNC: 107 MMOL/L (ref 96–108)
CO2 SERPL-SCNC: 26 MMOL/L (ref 21–32)
CO2 SERPL-SCNC: 32 MMOL/L (ref 21–32)
CREAT SERPL-MCNC: 0.79 MG/DL (ref 0.6–1.3)
CREAT SERPL-MCNC: 2.55 MG/DL (ref 0.6–1.3)
EOSINOPHIL # BLD AUTO: 0.3 THOUSAND/ÂΜL (ref 0–0.61)
EOSINOPHIL NFR BLD AUTO: 3 % (ref 0–6)
ERYTHROCYTE [DISTWIDTH] IN BLOOD BY AUTOMATED COUNT: 14 % (ref 11.6–15.1)
GFR SERPL CREATININE-BSD FRML MDRD: 20 ML/MIN/1.73SQ M
GFR SERPL CREATININE-BSD FRML MDRD: 85 ML/MIN/1.73SQ M
GLUCOSE SERPL-MCNC: 264 MG/DL (ref 65–140)
GLUCOSE SERPL-MCNC: 94 MG/DL (ref 65–140)
HCT VFR BLD AUTO: 29.7 % (ref 34.8–46.1)
HGB BLD-MCNC: 9.9 G/DL (ref 11.5–15.4)
IMM GRANULOCYTES # BLD AUTO: 0.03 THOUSAND/UL (ref 0–0.2)
IMM GRANULOCYTES NFR BLD AUTO: 0 % (ref 0–2)
LYMPHOCYTES # BLD AUTO: 4.68 THOUSANDS/ÂΜL (ref 0.6–4.47)
LYMPHOCYTES NFR BLD AUTO: 42 % (ref 14–44)
MCH RBC QN AUTO: 32.7 PG (ref 26.8–34.3)
MCHC RBC AUTO-ENTMCNC: 33.3 G/DL (ref 31.4–37.4)
MCV RBC AUTO: 98 FL (ref 82–98)
MONOCYTES # BLD AUTO: 0.86 THOUSAND/ÂΜL (ref 0.17–1.22)
MONOCYTES NFR BLD AUTO: 8 % (ref 4–12)
NEUTROPHILS # BLD AUTO: 5.09 THOUSANDS/ÂΜL (ref 1.85–7.62)
NEUTS SEG NFR BLD AUTO: 46 % (ref 43–75)
NRBC BLD AUTO-RTO: 0 /100 WBCS
PLATELET # BLD AUTO: 326 THOUSANDS/UL (ref 149–390)
PMV BLD AUTO: 9.9 FL (ref 8.9–12.7)
POTASSIUM SERPL-SCNC: 3.2 MMOL/L (ref 3.5–5.3)
POTASSIUM SERPL-SCNC: 3.3 MMOL/L (ref 3.5–5.3)
RBC # BLD AUTO: 3.03 MILLION/UL (ref 3.81–5.12)
SODIUM SERPL-SCNC: 138 MMOL/L (ref 135–147)
SODIUM SERPL-SCNC: 144 MMOL/L (ref 135–147)
WBC # BLD AUTO: 11.04 THOUSAND/UL (ref 4.31–10.16)

## 2022-10-05 RX ORDER — POTASSIUM CHLORIDE 20 MEQ/1
40 TABLET, EXTENDED RELEASE ORAL EVERY 4 HOURS
Status: COMPLETED | OUTPATIENT
Start: 2022-10-05 | End: 2022-10-05

## 2022-10-05 RX ORDER — LOPERAMIDE HCL 1 MG/7.5ML
2 SOLUTION ORAL 4 TIMES DAILY PRN
Status: DISCONTINUED | OUTPATIENT
Start: 2022-10-05 | End: 2022-11-07 | Stop reason: HOSPADM

## 2022-10-05 RX ORDER — CEFAZOLIN SODIUM 2 G/50ML
2000 SOLUTION INTRAVENOUS EVERY 8 HOURS
Status: DISCONTINUED | OUTPATIENT
Start: 2022-10-05 | End: 2022-10-06

## 2022-10-05 RX ORDER — DEXTROSE AND SODIUM CHLORIDE 5; .2 G/100ML; G/100ML
100 INJECTION, SOLUTION INTRAVENOUS CONTINUOUS
Status: DISPENSED | OUTPATIENT
Start: 2022-10-05 | End: 2022-10-05

## 2022-10-05 RX ADMIN — MICONAZOLE NITRATE: 20 CREAM TOPICAL at 17:32

## 2022-10-05 RX ADMIN — Medication 250 MG: at 17:32

## 2022-10-05 RX ADMIN — AMLODIPINE BESYLATE 10 MG: 10 TABLET ORAL at 08:50

## 2022-10-05 RX ADMIN — CEFAZOLIN SODIUM 2000 MG: 2 SOLUTION INTRAVENOUS at 10:40

## 2022-10-05 RX ADMIN — POTASSIUM CHLORIDE 40 MEQ: 1500 TABLET, EXTENDED RELEASE ORAL at 13:17

## 2022-10-05 RX ADMIN — QUETIAPINE FUMARATE 50 MG: 25 TABLET ORAL at 08:50

## 2022-10-05 RX ADMIN — DEXTROSE AND SODIUM CHLORIDE 100 ML/HR: 5; .2 INJECTION, SOLUTION INTRAVENOUS at 22:18

## 2022-10-05 RX ADMIN — DEXTROSE AND SODIUM CHLORIDE 100 ML/HR: 5; .2 INJECTION, SOLUTION INTRAVENOUS at 12:31

## 2022-10-05 RX ADMIN — VANCOMYCIN HYDROCHLORIDE 125 MG: 500 INJECTION, POWDER, LYOPHILIZED, FOR SOLUTION INTRAVENOUS at 08:54

## 2022-10-05 RX ADMIN — QUETIAPINE FUMARATE 50 MG: 25 TABLET ORAL at 17:32

## 2022-10-05 RX ADMIN — GABAPENTIN 100 MG: 250 SUSPENSION ORAL at 20:37

## 2022-10-05 RX ADMIN — GABAPENTIN 100 MG: 250 SUSPENSION ORAL at 08:54

## 2022-10-05 RX ADMIN — LOPERAMIDE HCL 2 MG: 1 SOLUTION ORAL at 14:45

## 2022-10-05 RX ADMIN — CEFAZOLIN SODIUM 2000 MG: 2 SOLUTION INTRAVENOUS at 17:32

## 2022-10-05 RX ADMIN — MICONAZOLE NITRATE: 20 CREAM TOPICAL at 08:54

## 2022-10-05 RX ADMIN — METOPROLOL TARTRATE 25 MG: 25 TABLET, FILM COATED ORAL at 20:37

## 2022-10-05 RX ADMIN — Medication 1 PATCH: at 08:54

## 2022-10-05 RX ADMIN — METOPROLOL TARTRATE 25 MG: 25 TABLET, FILM COATED ORAL at 08:50

## 2022-10-05 RX ADMIN — HEPARIN SODIUM 5000 UNITS: 5000 INJECTION INTRAVENOUS; SUBCUTANEOUS at 05:39

## 2022-10-05 RX ADMIN — HEPARIN SODIUM 5000 UNITS: 5000 INJECTION INTRAVENOUS; SUBCUTANEOUS at 13:17

## 2022-10-05 RX ADMIN — GABAPENTIN 100 MG: 250 SUSPENSION ORAL at 17:00

## 2022-10-05 RX ADMIN — HEPARIN SODIUM 5000 UNITS: 5000 INJECTION INTRAVENOUS; SUBCUTANEOUS at 22:17

## 2022-10-05 RX ADMIN — POTASSIUM CHLORIDE 40 MEQ: 1500 TABLET, EXTENDED RELEASE ORAL at 08:57

## 2022-10-05 RX ADMIN — Medication 250 MG: at 08:50

## 2022-10-05 NOTE — PROGRESS NOTES
[facility] [facility]  Progress Note - Nimco Hilton 1970, 45 y o  female MRN: [id number]  Unit/Bed#: [facility] 2 -01 Encounter: [id number]  Primary Care Provider: Jana Hernandez MD   Date and time admitted to hospital: 9/19/2022  8:04 AM    * Toxic metabolic encephalopathy  Assessment & Plan  · Acute encephalopathy secondary to gabapentin and morphine with subsequent renal failure, potentially component of psychiatric illness given history and bacteremia  · Consider hospital delirium due to prolong stay  · Received course of thiamine concern for Wernicke's encephalopathy  · LP and MRI negative for acute pathology, neurology evaluated  · Discussed with neurology, patient likely will have slow improvement if any over the coming weeks  · Continue dysphagia diet  · Speech therapy to continue follow-up  · With no significant improvement, consider repeat psych and Neurology consultation as renal function improved      Diarrhea  Assessment & Plan  Reports having multiple loose bowel movements  C diff negative  Continue p o  Vanc prophylaxis, will add Florastor  ID suspect likely antibiotic associated diarrhea    Bacteremia due to methicillin susceptible Staphylococcus aureus (MSSA)  Assessment & Plan  MSSA bacteremia, repeat cultures neg for greater than 72 hours  Echo with no vegetation  ID recommend CHERYLE  Continue Ancef, continue po vanc for prophylaxis  Cards recommend holding off CHERYLE until improvement in respiratory status and encephalopathy      D-dimer, elevated  Assessment & Plan  · Elevated D-dimer may be related to fall  · Lower extremity duplex negative for DVT  · Renal dysfunction cannot check CTA of chest but doubt PE    · Discontinued heparin infusion and transitioned to prophylaxis dose    Traumatic rhabdomyolysis [facility])  Assessment & Plan  · Secondary to fall, patient had been laying in bed for approximately 4 days  · She was subsequently brought to the hospital and found to be in acute rhabdomyolysis with ANUJA  · Had been on multiple sedating medications including MS Contin as well as gabapentin, which likely were contributing to sedation  · Now resolved    ARF (acute renal failure) ([facility] [address] )  Assessment & Plan  · ANUJA/ATN secondary to rhabdomyolysis  No evidence of renal obstruction on imaging  · Nephrology following  · No emergent need for hemodialysis  · Renal function improving    Results from last 7 days   Lab Units 10/05/22  0938 10/05/22  0449 10/04/22  0444 10/03/22  0913 10/02/22  0505 10/01/22  0507 10/01/22  0145 09/30/22  0137 09/29/22  0513   BUN mg/dL 30* 8 34* 34* 36* 39* 41* 40* 41*   CREATININE mg/dL 2 55* 0 79 3 07* 3 73* 4 79* 5 78* 5 88* 6 37* 6 45*   EGFR ml/min/1 73sq m 20 85 16 13 9 7 7 6 6       DDD (degenerative disc disease), lumbosacral  Assessment & Plan  · Lumbar radiculopathy with previously scheduled surgery now on hold  · Prior to admission was on gabapentin and morphine IR 15 mg  · Gabapentin resumed at lower dose, hold morphine due to concern for over sedation      Tobacco abuse  Assessment & Plan  · Nicotine patch ordered    Depression  Assessment & Plan  · Does have history of suicide attempt a currently does not show any signs of thoughts of self-harm  · Psychiatry consultation appreciated  · Continue Seroquel 50 mg twice a day   · Zyprexa 5 mg by mouth every 6 hours PRN agitation        VTE Pharmacologic Prophylaxis:   Pharmacologic: Heparin  Mechanical VTE Prophylaxis in Place: Yes    Patient Centered Rounds: I have performed bedside rounds with nursing staff today  Discussions with Specialists or Other Care Team Provider: Nephkalyani ID    Education and Discussions with Family / Patient: patient    Time Spent for Care: 30 minutes  More than 50% of total time spent on counseling and coordination of care as described above      Current Length of Stay: 16 day(s)    Current Patient Status: Inpatient   Certification Statement: The patient will continue to require additional inpatient hospital stay due to pending improvement of mental status, renal function, IV abx    Discharge Plan: pending    Code Status: Level 1 - Full Code      Subjective:   No events overnight, patient's mental status appeared to be unchanged from day prior  Objective:     Vitals:   Temp (24hrs), Av 5 °F (36 9 °C), Min:97 9 °F (36 6 °C), Max:99 1 °F (37 3 °C)    Temp:  [97 9 °F (36 6 °C)-99 1 °F (37 3 °C)] 99 1 °F (37 3 °C)  HR:  [] 105  Resp:  [18-20] 20  BP: (122-140)/(81-86) 140/86  SpO2:  [94 %-96 %] 94 %  Body mass index is 18 08 kg/m²  Input and Output Summary (last 24 hours): Intake/Output Summary (Last 24 hours) at 10/5/2022 1351  Last data filed at 10/4/2022 2023  Gross per 24 hour   Intake 1220 ml   Output --   Net 1220 ml       Physical Exam:     Physical Exam  Vitals and nursing note reviewed  Constitutional:       General: She is not in acute distress  Appearance: She is not ill-appearing  HENT:      Head: Normocephalic and atraumatic  Eyes:      General: No scleral icterus  Conjunctiva/sclera: Conjunctivae normal    Cardiovascular:      Rate and Rhythm: Normal rate  Pulses: Normal pulses  Pulmonary:      Effort: Pulmonary effort is normal  No respiratory distress  Abdominal:      General: Bowel sounds are normal  There is no distension  Palpations: Abdomen is soft  Musculoskeletal:         General: No swelling  Right lower leg: No edema  Left lower leg: No edema  Skin:     General: Skin is warm and dry  Neurological:      Mental Status: She is disoriented  Psychiatric:         Behavior: Behavior is slowed  Behavior is cooperative  Cognition and Memory: Cognition is impaired  Judgment: Judgment is impulsive         Additional Data:     Labs:    Results from last 7 days   Lab Units 10/05/22  0449   WBC Thousand/uL 11 04*   HEMOGLOBIN g/dL 9 9*   HEMATOCRIT % 29 7*   PLATELETS Thousands/uL 326   NEUTROS PCT % 46   LYMPHS PCT % 42   MONOS PCT % 8   EOS PCT % 3     Results from last 7 days   Lab Units 10/05/22  0938 10/05/22  0449 10/04/22  0444   SODIUM mmol/L 144   < > 146   POTASSIUM mmol/L 3 2*   < > 2 9*   CHLORIDE mmol/L 105   < > 102   CO2 mmol/L 32   < > 37*   BUN mg/dL 30*   < > 34*   CREATININE mg/dL 2 55*   < > 3 07*   ANION GAP mmol/L 7   < > 7   CALCIUM mg/dL 9 9   < > 9 7   ALBUMIN g/dL  --   --  2 5*   TOTAL BILIRUBIN mg/dL  --   --  0 36   ALK PHOS U/L  --   --  78   ALT U/L  --   --  18   AST U/L  --   --  30   GLUCOSE RANDOM mg/dL 264*   < > 137    < > = values in this interval not displayed  Results from last 7 days   Lab Units 09/30/22  0024   POC GLUCOSE mg/dl 126         Results from last 7 days   Lab Units 10/04/22  0444 10/01/22  0145   LACTIC ACID mmol/L  --  1 3   PROCALCITONIN ng/ml 0 26*  --            * I Have Reviewed All Lab Data Listed Above  * Additional Pertinent Lab Tests Reviewed: [facility] Admission Reviewed    Imaging:    CT chest abdomen pelvis wo contrast    Result Date: 9/19/2022  Impression: Bilateral groundglass opacities with superimposed inter and intralobular septal thickening  Differential diagnosis includes pulmonary hemorrhage, infection, and pulmonary edema, although the distribution is atypical for edema  The study was marked in [facility] for immediate notification  Workstation performed: [id number]     XR hip/pelv 2-3 vws right    Result Date: 9/19/2022  Impression: No acute osseous abnormality  Workstation performed: [id number]     XR ankle 3+ views RIGHT    Result Date: 9/19/2022  Impression: No acute osseous abnormality  Workstation performed: [id number]     XR foot 3+ views RIGHT    Result Date: 9/19/2022  Impression: No acute osseous abnormality  Workstation performed: [id number]     CT head without contrast    Result Date: 9/19/2022  Impression: No acute intracranial process  No skull fracture   Workstation performed: [id number]     CT spine cervical without contrast    Result Date: 9/19/2022  Impression: No cervical spine fracture or traumatic malalignment  Incidental finding of partially visualized subpleural groundglass opacity in the left pulmonary apex presumably scarring  Cannot exclude infiltrate  This study demonstrates a significant  finding and was documented as such in Psychiatric for liaison and referring practitioner notification  Workstation performed: [id number]     MRI brain wo contrast    Result Date: 9/23/2022  Impression: No evidence of recent infarct  No mass effect or midline shift  Study performed in the limited fashion with extensive motion artifact  Workstation performed: [id number]     XR chest 1 view    Result Date: 9/19/2022  Impression: No acute cardiopulmonary disease  Findings are stable Workstation performed: [id number]     US right upper quadrant with liver dopplers    Result Date: 9/20/2022  Impression: 1  Minimal layering sludge without evidence of acute cholecystitis  2   Normal-appearing liver with normal liver Dopplers  Workstation performed: [id number]     CT lower extremity wo contrast right    Result Date: 9/19/2022  Impression: There is no evidence of intramuscular hematoma or other mass lesion in the right calf  Please note that rhabdomyolysis and compartment syndrome are clinical diagnoses, and are not excluded based on these imaging findings   Workstation performed: [id number]       Recent Cultures (last 7 days):     Results from last 7 days   Lab Units 10/03/22  1823   C DIFF TOXIN B BY PCR  Negative       Last 24 Hours Medication List:   Current Facility-Administered Medications   Medication Dose Route Frequency Provider Last Rate   • acetaminophen  650 mg Oral Q6H PRN Kapil Mtz DO     • ALPRAZolam  0 5 mg Oral HS PRN Kenroy Beverly MD     • amLODIPine  10 mg Oral Daily Kady Tran MD     • cefazolin  2,000 mg Intravenous Q8H Alejandro Arcos MD 2,000 mg (10/05/22 1040)   • dextrose 5 % and sodium chloride 0 2 %  100 mL/hr Intravenous Continuous Aimee Cory Cronin  mL/hr (10/05/22 1231)   • gabapentin  100 mg Oral TID Hiral Berger MD     • heparin (porcine)  5,000 Units Subcutaneous [facility] Atul Adan, DO     • hydrALAZINE  5 mg Intravenous Q6H PRN Park García, DO     • HYDROmorphone  0 5 mg Intravenous Q4H PRN Karol Live, DO     • levalbuterol  1 25 mg Nebulization Q4H PRN Virtheo García, DO     • loperamide  2 mg Oral 4x Daily PRN Hiral Berger MD     • metoprolol tartrate  25 mg Oral Q12H [address] Inder Dow MD     • VANDANA ANTIFUNGAL   Topical BID Park García, DO     • nicotine  1 patch Transdermal Daily Atul Adan, DO     • OLANZapine  5 mg Oral Q6H PRN Park García, DO     • ondansetron  4 mg Intravenous Q4H PRN Karol Live DO     • QUEtiapine  50 mg Oral BID Hiral Berger MD     • saccharomyces boulardii  250 mg Oral BID Hiral Berger MD          Today, Patient Was Seen By: Hiral Berger    ** Please Note: Dictation voice to text software may have been used in the creation of this document   **

## 2022-10-05 NOTE — PLAN OF CARE
Problem: PHYSICAL THERAPY ADULT  Goal: Performs mobility at highest level of function for planned discharge setting  See evaluation for individualized goals  Description: Treatment/Interventions: Functional transfer training, LE strengthening/ROM, Elevations, Therapeutic exercise, Cognitive reorientation, Patient/family training, Equipment eval/education, Bed mobility, Gait training, Compensatory technique education, Continued evaluation, Spoke to nursing, Spoke to case management, Spoke to MD, OT, ST  Equipment Recommended: Logan Martinez (if patient does not own)       See flowsheet documentation for full assessment, interventions and recommendations  Outcome: Not Progressing  Note: Prognosis: Guarded  Problem List: Decreased strength, Impaired balance, Decreased cognition, Decreased safety awareness, Impaired judgement, Decreased skin integrity, Pain, Decreased coordination, Decreased mobility  Assessment: Judah Manjarrez was seen for a f/u session  Unfortunately pt w decline in function compared to prev sessions primarily due to cognitive deficits  Inconsistently follows commands and participates in mobility  Presents w deficits of cognition, balance, coordination, strength, pain  Unable to ambulate household distances or safely complete mobility and ADLs without Ax2  Would recommend STR vs LTC pending progress  Barriers to Discharge: Inaccessible home environment, Decreased caregiver support  Barriers to Discharge Comments: unable to identify at this time  PT Discharge Recommendation: Post acute rehabilitation services (may need transition to LTC based on progress)    See flowsheet documentation for full assessment

## 2022-10-05 NOTE — PLAN OF CARE
Problem: OCCUPATIONAL THERAPY ADULT  Goal: Performs self-care activities at highest level of function for planned discharge setting  See evaluation for individualized goals  Description: Treatment Interventions: ADL retraining, Functional transfer training, Patient/family training, Equipment evaluation/education, Cognitive reorientation, Endurance training          See flowsheet documentation for full assessment, interventions and recommendations  Outcome: Progressing  Note: Limitation: Decreased ADL status, Decreased Safe judgement during ADL, Decreased cognition, Decreased endurance, Decreased high-level ADLs     Assessment: Patient participated in skilled OT session this date with interventions consisting of therapeutic activities and self-care/ADL training to increase B UE strength, functional endurance/activity tolerance, static/dynamic sitting/standing balance, and overall safety awareness to increase (I) with ADLs/IADLs to return to PLOF  Provided education on energy conservation techniques, deep breathing techniques, fall prevention strategies, and overall safety awareness  Patient agreeable to OT treatment session, upon arrival patient was found supine in bed; restraints applied  Patient requiring verbal cues for safety and verbal cues for pacing through activity steps  Pt A&O to self, with hint provided for last name  She inconsistenly follows 1-step commands  Refer to flowsheet for functional performance  Patient continues to be functioning below baseline level, occupational performance remains limited secondary to factors listed above and increased risk for falls and injury  The patient's raw score on the AM-PAC Daily Activity inpatient short form is 7, standardized score is 29 04, less than 39 4  Patients at this level are likely to benefit from discharge to post-acute rehabilitation services vs LTC pending progress   Please refer to the recommendation of the Occupational Therapist for safe discharge planning       OT Discharge Recommendation: Post acute rehabilitation services

## 2022-10-05 NOTE — ASSESSMENT & PLAN NOTE
· ANUJA/ATN secondary to rhabdomyolysis    No evidence of renal obstruction on imaging  · Nephrology following  · No emergent need for hemodialysis  · Renal function improving    Results from last 7 days   Lab Units 10/05/22  0938 10/05/22  0449 10/04/22  0444 10/03/22  0913 10/02/22  0505 10/01/22  0507 10/01/22  0145 09/30/22  0137 09/29/22  0513   BUN mg/dL 30* 8 34* 34* 36* 39* 41* 40* 41*   CREATININE mg/dL 2 55* 0 79 3 07* 3 73* 4 79* 5 78* 5 88* 6 37* 6 45*   EGFR ml/min/1 73sq m 20 85 16 13 9 7 7 6 6

## 2022-10-05 NOTE — OCCUPATIONAL THERAPY NOTE
Occupational Therapy Progress Note     Patient Name: Yanet Mascorro  Today's Date: 10/5/2022  Problem List  Principal Problem:    Toxic metabolic encephalopathy  Active Problems:    Depression    Tobacco abuse    DDD (degenerative disc disease), lumbosacral    ARF (acute renal failure) (HCC)    Transaminitis    Abnormal CT of the chest    Traumatic rhabdomyolysis (HCC)    D-dimer, elevated    Leg edema, right    Localized swelling of right upper extremity    Bacteremia due to methicillin susceptible Staphylococcus aureus (MSSA)    Pulmonary edema    Aspiration pneumonitis (HCC)    Hypokalemia    Hypernatremia    Diarrhea       10/05/22 1500   OT Last Visit   OT Visit Date 10/05/22   Note Type   Note Type Treatment   Restrictions/Precautions   Weight Bearing Precautions Per Order No   Other Precautions Cognitive; Chair Alarm; Bed Alarm; Restraints;Multiple lines; Fall Risk;Pain   Pain Assessment   Pain Assessment Tool FLACC   Pain Rating: FLACC (Rest) - Face 0   Pain Rating: FLACC (Rest) - Legs 0   Pain Rating: FLACC (Rest) - Activity 0   Pain Rating: FLACC (Rest) - Cry 0   Pain Rating: FLACC (Rest) - Consolability 0   Score: FLACC (Rest) 0   Pain Rating: FLACC (Activity) - Face 1   Pain Rating: FLACC (Activity) - Legs 1   Pain Rating: FLACC (Activity) - Activity 1   Pain Rating: FLACC (Activity) - Cry 1   Pain Rating: FLACC (Activity) - Consolability 0   Score: FLACC (Activity) 4   ADL   Where Assessed Edge of bed   Grooming Assistance 2  Maximal Assistance   Grooming Deficit Setup;Verbal cueing;Supervision/safety; Increased time to complete;Wash/dry face; Wash/dry hands   LB Dressing Assistance 1  Total Assistance   LB Dressing Deficit Setup; Requires assistive device for steadying;Steadying; Impulsive;Verbal cueing;Supervision/safety; Increased time to complete; Don/doff R sock; Don/doff L sock; Thread RLE into underwear; Thread LLE into underwear;Pull up over hips   Toileting Assistance  1  Total Assistance   Toileting Deficit Setup;Verbal cueing;Steadying;Supervison/safety; Increased time to complete; Bedside commode;Clothing management up;Clothing management down;Perineal hygiene   Bed Mobility   Supine to Sit 2  Maximal assistance   Additional items Assist x 2;HOB elevated; Bedrails; Increased time required;Verbal cues;LE management; Other   Sit to Supine 2  Maximal assistance   Additional items Assist x 2; Increased time required;LE management;Verbal cues; Other   Additional Comments increased assist needed due to impaired cognition   Transfers   Sit to Stand 4  Minimal assistance   Additional items Assist x 2; Increased time required;Verbal cues; Impulsive; Other  (FWW)   Stand to Sit 4  Minimal assistance   Additional items Assist x 2; Increased time required; Impulsive;Verbal cues; Other  (FWW)   Toilet transfer 4  Minimal assistance   Additional items Assist x 2; Increased time required;Verbal cues; Commode; Other  (FWW)   Additional Comments vitals post amb: 112/93, 110-130s, 97%   Functional Mobility   Functional Mobility 3  Moderate assistance   Additional Comments x2 with FWW   Additional items Rolling walker   Cognition   Overall Cognitive Status Impaired   Arousal/Participation Alert   Attention Difficulty attending to directions   Orientation Level Oriented to person   Memory Decreased recall of precautions;Decreased recall of recent events;Decreased short term memory;Decreased recall of biographical information   Following Commands Follows one step commands with increased time or repetition   Comments unable to make needs known/ express herself; diffculty following directions and staying on task, highly distractable   Activity Tolerance   Activity Tolerance Patient tolerated treatment well;Treatment limited secondary to medical complications (Comment)  (cognition)   Medical Staff Made Aware Nrsg, aide, PT   Assessment   Assessment Patient participated in skilled OT session this date with interventions consisting of therapeutic activities and self-care/ADL training to increase B UE strength, functional endurance/activity tolerance, static/dynamic sitting/standing balance, and overall safety awareness to increase (I) with ADLs/IADLs to return to PLOF  Provided education on energy conservation techniques, deep breathing techniques, fall prevention strategies, and overall safety awareness  Patient agreeable to OT treatment session, upon arrival patient was found supine in bed; restraints applied  Patient requiring verbal cues for safety and verbal cues for pacing through activity steps  Pt A&O to self, with hint provided for last name  She inconsistenly follows 1-step commands  Refer to flowsheet for functional performance  Patient continues to be functioning below baseline level, occupational performance remains limited secondary to factors listed above and increased risk for falls and injury  The patient's raw score on the AM-PAC Daily Activity inpatient short form is 7, standardized score is 29 04, less than 39 4  Patients at this level are likely to benefit from discharge to post-acute rehabilitation services vs LTC pending progress  Please refer to the recommendation of the Occupational Therapist for safe discharge planning  Plan   Treatment Interventions ADL retraining;Functional transfer training;Patient/family training;Equipment evaluation/education;Cognitive reorientation; Endurance training   Goal Expiration Date 10/18/22   OT Treatment Day 5   OT Frequency 3-5x/wk   Recommendation   OT Discharge Recommendation Post acute rehabilitation services   AM-[facility] Daily Activity Inpatient   Lower Body Dressing 1   Bathing 1   Toileting 1   Upper Body Dressing 1   Grooming 1   Eating 2   Daily Activity Raw Score 7   Turning Head Towards Sound 2   Follow Simple Instructions 2   Low Function Daily Activity Raw Score 11   Low Function Daily Activity Standardized Score 19 45   AM-PAC Applied Cognition Inpatient   Following a Speech/Presentation 2   Understanding Ordinary Conversation 2   Taking Medications 1   Remembering Where Things Are Placed or Put Away 1   Remembering List of 4-5 Errands 1   Taking Care of Complicated Tasks 1   Applied Cognition Raw Score 8   Applied Cognition Standardized Score 19 32     Aleyda Calderon

## 2022-10-05 NOTE — SPEECH THERAPY NOTE
Speech Language/Pathology    Speech/Language Pathology Progress Note    Patient Name: Chante Small  Today's Date: 10/5/2022     Problem List  Principal Problem:    Toxic metabolic encephalopathy  Active Problems:    Depression    Tobacco abuse    DDD (degenerative disc disease), lumbosacral    ARF (acute renal failure) (HCC)    Transaminitis    Abnormal CT of the chest    Traumatic rhabdomyolysis (HCC)    D-dimer, elevated    Leg edema, right    Localized swelling of right upper extremity    Bacteremia due to methicillin susceptible Staphylococcus aureus (MSSA)    Pulmonary edema    Aspiration pneumonitis (HCC)    Hypokalemia    Hypernatremia    Diarrhea       Past Medical History  Past Medical History:   Diagnosis Date   • Chronic pain disorder    • Depression    • GERD (gastroesophageal reflux disease)    • History of electroconvulsive therapy    • Low back pain    • Self-injurious behavior    • Suicide attempt [facility])         Past Surgical History  Past Surgical History:   Procedure Laterality Date   •  SECTION     • COLONOSCOPY     • PANCREAS SURGERY      "pseudocysts" per patient's  Ricki Infante   • NC ESOPHAGOGASTRODUODENOSCOPY TRANSORAL DIAGNOSTIC N/A 4/10/2018    Procedure: EGD AND COLONOSCOPY;  Surgeon: Kusum Fofana MD;  [place] [facility]; Service: Gastroenterology         Subjective:  Unable to alert pt for lunch  returned ~ 2 hours later  Still sleeping but I was able to alert  Objective:  Seen for po tolerance and potential upgrade, likes fruit  Ordered her canned chopped peaches and pears, pudding, apple sauce, apple juice  Needed constant verbal cues to attend and accept PO  Kept swaying head back and forth  Did not respond to 95% of questions  Tolerated thin liquids, pudding, applesauce w/ prompt transfer and swallow  Accepted chopped fruit, mastcation was prolonged but functional  Towards the end of the session pt sat up and forward abruptly   Kept hanging her head down (in wrist restraints) and I was unabelt get her to stay upright or lift head up for additional PO  O2 sats were maintained throughout session on RA  No cough or wet vocal quality  Assessment:  Continues w/ need for stim, swaying back and forth, minimal verbalizations  Plan/Recommendations:  Puree w/ thin  Must be supervised, fully upright and alert for meals  Liquid supplements  Prefers fruits, puddings

## 2022-10-05 NOTE — ASSESSMENT & PLAN NOTE
· Secondary to fall, patient had been laying in bed for approximately 4 days  · She was subsequently brought to the hospital and found to be in acute rhabdomyolysis with ANUJA  · Had been on multiple sedating medications including MS Contin as well as gabapentin, which likely were contributing to sedation  · Now resolved

## 2022-10-05 NOTE — PROGRESS NOTES
Follow up Consultation    Nephrology   Smith Stout 45 y o  female MRN: [id number]  Unit/Bed#: [address] 87 217-01 Encounter: [id number]      Physician Requesting Consult: Jordan Wilkes MD        ASSESSMENT/PLAN:  62 year female multiple comorbidities including GERD, depression, substance abuse admitted with altered mental status  Nephrology following for acute kidney injury  Acute kidney injury (POA):  ANUJA multifactorial most likely secondary to severe rhabdomyolysis plus some component prerenal azotemia plus likely some component of infectious GN due to MSSA bacteremia  After review of records In [facility] as well as Care everywhere it appears that the patient has a baseline Creatinine of 0 8-1 1 mg/dL  patient was admitted with a creatinine of 5 88 mg/dL on 09/19/2022  Peak creatinine this hospitalization at 7 67 mg/dL on 09/25/2022  patient's creatinine today is at 2 55 mg/dL, continues to improve and stable  No acute indication for dialysis at this time  Dialysis consent was obtained previously per by my colleague and is in the chart if needed  Overall patient does not appear to be good candidate for dialysis since she has been pulling out tubes  I am not sure if she will be able to even hold still for dialysis and allow for the dialysis catheter to stay in  In light of improving renal function will hold off on dialysis for now  If renal function continue to deteriorate we may need to rediscuss with spouse further management  Clinically appears to be hypovolemic to euvolemic hold off further  diuretics at this time   Will give additional IV fluids 1 L quarter normal saline at 100 cc an hour  check BMP in a m  Await renal recovery  Optimize hemodynamic status to avoid delay in renal recovery  Place on a renal diet when allowed diet order  Avoid nephrotoxins, adjust meds to appropriate GFR  Strict I/O    Daily weights  Urinary retention protocol if patient does not have a Rivera  will need to set up patient for follow up with Nephrology as an outpatient post hospitalization  Blood pressure/hypertension:  current medications:  Norvasc 10 mg p o  Q day, metoprolol 25 mg p o  Q 12  recommendations:  Last dose of Bumex 2 mg on 10/02/2022 if patient with worsening shortness of breath may give additional dosage  Optimize hemodynamics  Maintain MAP > 65mmHg  Avoid BP fluctuations  H/H/anemia:  most recent hemoglobin at 12 2 grams/deciliter  maintain hemoglobin greater than 8 grams/deciliter    Acid-base electrolytes:    Electrolytes:    Stable  Hypernatremia:   Most recent sodium at 144 mEq  Encourage free water intake for now  Will place on quarter normal saline 100 cc an hour for 1 L    Hypokalemia:  Most recent potassium at 3 2 mEq  Supplement today  Recheck    Hyperphosphatemia:  Most recent phosphorus down to 2 6 improving    Acid-base:    Most recent bicarb at 32, alkalosis improving    Other medical problems:  Proteinuria: Most recent UA from 09/19/2022 with trace protein  MSSA bacteremia:  Management per primary team   On Ancef  CT chest concerning for multifocal opacities concerning for multifocal pneumonia new small moderate bilateral pleural effusions  Altered mental status/encephalopathy:  Appears to be multifactorial secondary to underlying psych illness plus gabapentin plus morphine usage, in light of continued improvement in renal function doubt renal dysfunction is playing a role  Overall improving  Management per primary team      Thanks for the consult  Will continue to follow  Please call with questions/ concerns    Above-mentioned orders and Plan in terms of acute kidney injury was discussed with the team in depth in-person    Aimee Alcazar, 10/5/2022, 10:36 AM              Objective :   Patient seen and examined in her room no overnight events hemodynamically stable remains afebrile urine output not adequately documented oriented to person only      PHYSICAL EXAM  /86 Pulse 105   Temp 99 1 °F (37 3 °C)   Resp 20   Ht 5' 6" (1 676 m)   Wt 50 8 kg (111 lb 15 9 oz)   LMP 2019 (Approximate)   SpO2 94%   BMI 18 08 kg/m²   Temp (24hrs), Av 5 °F (36 9 °C), Min:97 9 °F (36 6 °C), Max:99 1 °F (37 3 °C)        Intake/Output Summary (Last 24 hours) at 10/5/2022 1036  Last data filed at 10/4/2022 2023  Gross per 24 hour   Intake 1270 ml   Output --   Net 1270 ml       I/O last 24 hours: In: 26 [P O :240; I V :980; IV Piggyback:50]  Out: -       Current Weight: Weight - Scale: 50 8 kg (111 lb 15 9 oz)  First Weight: Weight - Scale: 62 6 kg (138 lb 0 1 oz)  Physical Exam  Vitals and nursing note reviewed  Constitutional:       General: She is not in acute distress  Appearance: Normal appearance  She is normal weight  She is ill-appearing  She is not toxic-appearing  HENT:      Head: Normocephalic and atraumatic  Mouth/Throat:      Mouth: Mucous membranes are moist       Pharynx: Oropharynx is clear  No oropharyngeal exudate  Eyes:      General: No scleral icterus  Conjunctiva/sclera: Conjunctivae normal    Cardiovascular:      Rate and Rhythm: Normal rate  Heart sounds: Normal heart sounds  No friction rub  Pulmonary:      Effort: Pulmonary effort is normal  No respiratory distress  Breath sounds: Normal breath sounds  No stridor  No wheezing  Abdominal:      General: There is no distension  Palpations: Abdomen is soft  There is no mass  Tenderness: There is no abdominal tenderness  Musculoskeletal:         General: No swelling  Cervical back: Normal range of motion and neck supple  No rigidity  Skin:     General: Skin is warm  Coloration: Skin is not jaundiced  Neurological:      General: No focal deficit present  Mental Status: She is alert     Psychiatric:         Mood and Affect: Mood normal              Review of Systems   Unable to perform ROS: Mental status change   Constitutional: Negative for appetite change, chills and fever  HENT: Negative for congestion and sore throat  Respiratory: Negative for cough, shortness of breath and wheezing  Cardiovascular: Negative for leg swelling  Gastrointestinal: Negative for abdominal pain, constipation, diarrhea, nausea and vomiting  Genitourinary: Negative for dysuria  Musculoskeletal: Negative for back pain  Neurological: Negative for headaches  Psychiatric/Behavioral: Positive for confusion  All other systems reviewed and are negative  Scheduled Meds:  Current Facility-Administered Medications   Medication Dose Route Frequency Provider Last Rate   • acetaminophen  650 mg Oral Q6H PRN Santiago Espinoza DO     • ALPRAZolam  0 5 mg Oral HS PRN Eli Quesada MD     • amLODIPine  10 mg Oral Daily Britni Sears MD     • cefazolin  2,000 mg Intravenous Q8H Orville Jeffery MD     • gabapentin  100 mg Oral TID Eli Quesada MD     • heparin (porcine)  5,000 Units Subcutaneous [facility] Adan, DO     • hydrALAZINE  5 mg Intravenous Q6H PRN Marguerite Hansen, DO     • HYDROmorphone  0 5 mg Intravenous Q4H PRN Santiago Espinoza DO     • levalbuterol  1 25 mg Nebulization Q4H PRN Marguerite Merck, DO     • metoprolol tartrate  25 mg Oral Q12H [address] Inder Estrada MD     • VANDANA ANTIFUNGAL   Topical BID Marguerite Merck, DO     • nicotine  1 patch Transdermal Daily Atul Adan DO     • OLANZapine  5 mg Oral Q6H PRN Marguerite Merck, DO     • ondansetron  4 mg Intravenous Q4H PRN Santiago Espinoza DO     • potassium chloride  40 mEq Oral Q4H Inder Estrada MD     • QUEtiapine  50 mg Oral BID Eli Quesada MD     • saccharomyces boulardii  250 mg Oral BID Eli Quesada MD         PRN Meds: •  acetaminophen  •  ALPRAZolam  •  hydrALAZINE  •  HYDROmorphone  •  levalbuterol  •  OLANZapine  •  ondansetron    Continuous Infusions:       Invasive Devices:      Invasive Devices  Report    Peripheral Intravenous Line  Duration           Peripheral IV 10/04/22 Proximal;Right;Ventral (anterior) Forearm 1 day          Drain  Duration           NG/OG/Enteral Tube Enteral Feeding Tube Right nare 4 days                  LABORATORY:    Results from last 7 days   Lab Units 10/05/22  0938 10/05/22  0449 10/04/22  0444 10/03/22  0913 10/03/22  0507 10/02/22  0505 10/01/22  0507 10/01/22  0145 09/30/22  0137 09/29/22  0513   WBC Thousand/uL  --  11 04* 18 05*  --  20 32* 15 05* 18 54* 19 44* 14 78* 9 27   HEMOGLOBIN g/dL  --  9 9* 10 8*  --  12 2 12 2 9 7* 11 0* 9 8* 9 7*   HEMATOCRIT %  --  29 7* 34 5*  --  38 8 38 4 31 2* 34 4* 31 0* 30 4*   PLATELETS Thousands/uL  --  326 320  --  341 339 325 336 280 282   POTASSIUM mmol/L 3 2* 3 3* 2 9* 3 6  --  3 1* 3 2* 3 1* 3 8 3 2*   CHLORIDE mmol/L 105 107 102 100  --  102 102 104 104 103   CO2 mmol/L 32 26 37* 35*  --  38* 33* 34* 30 30   BUN mg/dL 30* 8 34* 34*  --  36* 39* 41* 40* 41*   CREATININE mg/dL 2 55* 0 79 3 07* 3 73*  --  4 79* 5 78* 5 88* 6 37* 6 45*   CALCIUM mg/dL 9 9 9 0 9 7 9 7  --  9 8 9 0 9 3 9 1 8 9   MAGNESIUM mg/dL  --   --   --   --  1 7 1 9 2 1 1 7  --  1 8   PHOSPHORUS mg/dL  --   --   --   --  2 6* 2 6* 5 3* 5 4*  --   --       rest all reviewed    RADIOLOGY:  CT chest wo contrast   Final Result by Selene Simpson MD (10/01 4829)      Persistent multifocal pulmonary opacities with new consolidative opacity in right lower lobe, suspicious for combination of multifocal pneumonia and pulmonary edema  New small-to-moderate bilateral pleural effusions (right worse than left), worsened bilateral lower lobe subsegmental atelectasis (right worse than left), and new mild-to-moderate body wall edema  The study was marked in [facility] for immediate notification  Workstation performed: [id number]         XR chest portable   Final Result by Oskar Calderon MD (10/01 1233)      Persistent extensive patchy bilateral interstitial alveolar consolidation likely slightly increased    Small right pleural effusion  Feeding tube  Workstation performed: [id number]         XR abdomen 1 view kub   Final Result by Henry Mckee MD (10/01 1100)      Feeding tube tip within the proximal stomach  Workstation performed: [id number]         XR abdomen 1 view kub   Final Result by Henry Mckee MD (10/01 1100)      Feeding tube tip within the proximal stomach  Workstation performed: [id number]         XR chest portable   Final Result by Mehreen Kent MD (09/30 1916)         1  Feeding tube mid to distal esophagus  Patient has a subsequent follow-up abdominal image  2   Bilateral pulmonary opacities consistent with pneumonia or pulmonary edema unchanged since comparison  Workstation performed: [id number]         XR abdomen 1 view kub   Final Result by Mehreen Kent MD (09/30 1918)      Advancement of feeding tube into the stomach with no acute findings  Workstation performed: [id number]         XR chest portable   Final Result by Lucius Andres MD (32/02 1390)      There is new bilateral central airspace opacities, left greater than right, which could be pulmonary edema or pneumonia  Workstation performed: [id number]         VAS lower limb venous duplex study, unilateral/limited   Final Result by Hafsa Clay MD (09/25 2224)      MRI brain wo contrast   Final Result by [facility],  (09/23 1510)      No evidence of recent infarct  No mass effect or midline shift  Study performed in the limited fashion with extensive motion artifact  Workstation performed: [id number]         US right upper quadrant with liver dopplers   Final Result by Tai Waggoner MD (09/20 1326)      1  Minimal layering sludge without evidence of acute cholecystitis  2   Normal-appearing liver with normal liver Dopplers        Workstation performed: [id number]         VAS lower limb venous duplex study, unilateral/limited   Final Result by Jahaira Durant MD (09/19 2204)      CT lower extremity wo contrast right   Final Result by Chinyere Chacon MD (09/19 1535)      There is no evidence of intramuscular hematoma or other mass lesion in the right calf  Please note that rhabdomyolysis and compartment syndrome are clinical diagnoses, and are not excluded based on these imaging findings  Workstation performed: [id number]         CT chest abdomen pelvis wo contrast   Final Result by Chandni Kelsey MD (09/19 1301)      Bilateral groundglass opacities with superimposed inter and intralobular septal thickening  Differential diagnosis includes pulmonary hemorrhage, infection, and pulmonary edema, although the distribution is atypical for edema  The study was marked in [facility] for immediate notification  Workstation performed: [id number]         XR ankle 3+ views RIGHT   Final Result by Linda Lyons MD (09/19 1009)      No acute osseous abnormality  Workstation performed: [id number]         XR foot 3+ views RIGHT   Final Result by Linda Lyons MD (09/19 1009)      No acute osseous abnormality  Workstation performed: [id number]         XR hip/pelv 2-3 vws right   Final Result by Linda Lyons MD (09/19 1007)      No acute osseous abnormality  Workstation performed: [id number]         XR chest 1 view   Final Result by Linda Lyons MD (09/19 1008)      No acute cardiopulmonary disease  Findings are stable            Workstation performed: [id number]         CT head without contrast   Final Result by Eric Dickinson MD (09/19 9804)      No acute intracranial process  No skull fracture  Workstation performed: [id number]         CT spine cervical without contrast   Final Result by Eric Dickinson MD (09/19 1662)      No cervical spine fracture or traumatic malalignment  Incidental finding of partially visualized subpleural groundglass opacity in the left pulmonary apex presumably scarring  Cannot exclude infiltrate  This study demonstrates a significant  finding and was documented as such in [facility] for liaison and referring practitioner notification  Workstation performed: [id number]         XR chest portable    (Results Pending)     Rest all reviewed    Portions of the record may have been created with voice recognition software  Occasional wrong word or "sound a like" substitutions may have occurred due to the inherent limitations of voice recognition software  Read the chart carefully and recognize, using context, where substitutions have occurred  If you have any questions, please contact the dictating provider

## 2022-10-05 NOTE — PLAN OF CARE
Problem: PAIN - ADULT  Goal: Verbalizes/displays adequate comfort level or baseline comfort level  Description: Interventions:  - Encourage patient to monitor pain and request assistance  - Assess pain using appropriate pain scale  - Administer analgesics based on type and severity of pain and evaluate response  - Implement non-pharmacological measures as appropriate and evaluate response  - Consider cultural and social influences on pain and pain management  - Notify physician/advanced practitioner if interventions unsuccessful or patient reports new pain  Outcome: Progressing     Problem: INFECTION - ADULT  Goal: Absence or prevention of progression during hospitalization  Description: INTERVENTIONS:  - Assess and monitor for signs and symptoms of infection  - Monitor lab/diagnostic results  - Monitor all insertion sites, i e  indwelling lines, tubes, and drains  - Monitor endotracheal if appropriate and nasal secretions for changes in amount and color  - [place] appropriate cooling/warming therapies per order  - Administer medications as ordered  - Instruct and encourage patient and family to use good hand hygiene technique  - Identify and instruct in appropriate isolation precautions for identified infection/condition  Outcome: Progressing     Problem: SAFETY ADULT  Goal: Patient will remain free of falls  Description: INTERVENTIONS:  - Educate patient/family on patient safety including physical limitations  - Instruct patient to call for assistance with activity   - Consult OT/PT to assist with strengthening/mobility   - Keep Call bell within reach  - Keep bed low and locked with side rails adjusted as appropriate  - Keep care items and personal belongings within reach  - Initiate and maintain comfort rounds  - Make Fall Risk Sign visible to staff  - Offer Toileting every 2 Hours, in advance of need  - Initiate/Maintain bed alarm  - Obtain necessary fall risk management equipment: walker  - Apply yellow socks and bracelet for high fall risk patients  - Consider moving patient to room near nurses station  Outcome: Progressing  Goal: Maintain or return to baseline ADL function  Description: INTERVENTIONS:  -  Assess patient's ability to carry out ADLs; assess patient's baseline for ADL function and identify physical deficits which impact ability to perform ADLs (bathing, care of mouth/teeth, toileting, grooming, dressing, etc )  - Assess/evaluate cause of self-care deficits   - Assess range of motion  - Assess patient's mobility; develop plan if impaired  - Assess patient's need for assistive devices and provide as appropriate  - Encourage maximum independence but intervene and supervise when necessary  - Involve family in performance of ADLs  - Assess for home care needs following discharge   - Consider OT consult to assist with ADL evaluation and planning for discharge  - Provide patient education as appropriate  Outcome: Progressing  Goal: Maintains/Returns to pre admission functional level  Description: INTERVENTIONS:  - Perform BMAT or MOVE assessment daily    - Set and communicate daily mobility goal to care team and patient/family/caregiver  - Collaborate with rehabilitation services on mobility goals if consulted  - Perform Range of Motion 4-6 times a day  - Reposition patient every 2 hours    - Dangle patient 3-4 times a day  - Stand patient 3 times a day  - Ambulate patient 3-4 times a day  - Out of bed for toileting  - Record patient progress and toleration of activity level   Outcome: Progressing     Problem: DISCHARGE PLANNING  Goal: Discharge to home or other facility with appropriate resources  Description: INTERVENTIONS:  - Identify barriers to discharge w/patient and caregiver  - Arrange for needed discharge resources and transportation as appropriate  - Identify discharge learning needs (meds, wound care, etc )  - Arrange for interpretive services to assist at discharge as needed  - Refer to Case Management Department for coordinating discharge planning if the patient needs post-hospital services based on physician/advanced practitioner order or complex needs related to functional status, cognitive ability, or social support system  Outcome: Progressing     Problem: Knowledge Deficit  Goal: Patient/family/caregiver demonstrates understanding of disease process, treatment plan, medications, and discharge instructions  Description: Complete learning assessment and assess knowledge base    Interventions:  - Provide teaching at level of understanding  - Provide teaching via preferred learning methods  Outcome: Progressing     Problem: Potential for Falls  Goal: Patient will remain free of falls  Description: INTERVENTIONS:  - Educate patient/family on patient safety including physical limitations  - Instruct patient to call for assistance with activity   - Consult OT/PT to assist with strengthening/mobility   - Keep Call bell within reach  - Keep bed low and locked with side rails adjusted as appropriate  - Keep care items and personal belongings within reach  - Initiate and maintain comfort rounds  - Make Fall Risk Sign visible to staff  - Offer Toileting every 2 Hours, in advance of need  - Initiate/Maintain bed alarm  - Obtain necessary fall risk management equipment: walker  - Apply yellow socks and bracelet for high fall risk patients  - Consider moving patient to room near nurses station  Outcome: Progressing     Problem: MOBILITY - ADULT  Goal: Maintain or return to baseline ADL function  Description: INTERVENTIONS:  -  Assess patient's ability to carry out ADLs; assess patient's baseline for ADL function and identify physical deficits which impact ability to perform ADLs (bathing, care of mouth/teeth, toileting, grooming, dressing, etc )  - Assess/evaluate cause of self-care deficits   - Assess range of motion  - Assess patient's mobility; develop plan if impaired  - Assess patient's need for assistive devices and provide as appropriate  - Encourage maximum independence but intervene and supervise when necessary  - Involve family in performance of ADLs  - Assess for home care needs following discharge   - Consider OT consult to assist with ADL evaluation and planning for discharge  - Provide patient education as appropriate  Outcome: Progressing  Goal: Maintains/Returns to pre admission functional level  Description: INTERVENTIONS:  - Perform BMAT or MOVE assessment daily    - Set and communicate daily mobility goal to care team and patient/family/caregiver  - Collaborate with rehabilitation services on mobility goals if consulted  - Perform Range of Motion 4-6 times a day  - Reposition patient every 2 hours  - Dangle patient 3-4 times a day  - Stand patient 3 times a day  - Ambulate patient 3-4 times a day  - Out of bed for toileting  - Record patient progress and toleration of activity level   Outcome: Progressing     Problem: Prexisting or High Potential for Compromised Skin Integrity  Goal: Skin integrity is maintained or improved  Description: INTERVENTIONS:  - Identify patients at risk for skin breakdown  - Assess and monitor skin integrity  - Assess and monitor nutrition and hydration status  - Monitor labs   - Assess for incontinence   - Turn and reposition patient  - Assist with mobility/ambulation  - Relieve pressure over bony prominences  - Avoid friction and shearing  - Provide appropriate hygiene as needed including keeping skin clean and dry  - Evaluate need for skin moisturizer/barrier cream  - Collaborate with interdisciplinary team   - Patient/family teaching  - Consider wound care consult   Outcome: Progressing     Problem: Nutrition/Hydration-ADULT  Goal: Nutrient/Hydration intake appropriate for improving, restoring or maintaining nutritional needs  Description: Monitor and assess patient's nutrition/hydration status for malnutrition   Collaborate with interdisciplinary team and initiate plan and interventions as ordered  Monitor patient's weight and dietary intake as ordered or per policy  Utilize nutrition screening tool and intervene as necessary  Determine patient's food preferences and provide high-protein, high-caloric foods as appropriate  INTERVENTIONS:  - Monitor oral intake, urinary output, labs, and treatment plans  - Assess nutrition and hydration status and recommend course of action  - Evaluate amount of meals eaten  - Assist patient with eating if necessary   - Allow adequate time for meals  - Recommend/ encourage appropriate diets, oral nutritional supplements, and vitamin/mineral supplements  - Order, calculate, and assess calorie counts as needed  - Recommend, monitor, and adjust tube feedings and TPN/PPN based on assessed needs  - Assess need for intravenous fluids  - Provide specific nutrition/hydration education as appropriate  - Include patient/family/caregiver in decisions related to nutrition  Outcome: Progressing     Problem: NEUROSENSORY - ADULT  Goal: Achieves maximal functionality and self care  Description: INTERVENTIONS  - Monitor swallowing and airway patency with patient fatigue and changes in neurological status  - Encourage and assist patient to increase activity and self care     - Encourage visually impaired, hearing impaired and aphasic patients to use assistive/communication devices  Outcome: Progressing     Problem: CARDIOVASCULAR - ADULT  Goal: Maintains optimal cardiac output and hemodynamic stability  Description: INTERVENTIONS:  - Monitor I/O, vital signs and rhythm  - Monitor for S/S and trends of decreased cardiac output  - Administer and titrate ordered vasoactive medications to optimize hemodynamic stability  - Assess quality of pulses, skin color and temperature  - Assess for signs of decreased coronary artery perfusion  - Instruct patient to report change in severity of symptoms  Outcome: Progressing     Problem: RESPIRATORY - ADULT  Goal: Achieves optimal ventilation and oxygenation  Description: INTERVENTIONS:  - Assess for changes in respiratory status  - Assess for changes in mentation and behavior  - Position to facilitate oxygenation and minimize respiratory effort  - Oxygen administered by appropriate delivery if ordered  - Initiate smoking cessation education as indicated  - Encourage broncho-pulmonary hygiene including cough, deep breathe, Incentive Spirometry  - Assess the need for suctioning and aspirate as needed  - Assess and instruct to report SOB or any respiratory difficulty  - Respiratory Therapy support as indicated  Outcome: Progressing     Problem: GASTROINTESTINAL - ADULT  Goal: Maintains adequate nutritional intake  Description: INTERVENTIONS:  - Monitor percentage of each meal consumed  - Identify factors contributing to decreased intake, treat as appropriate  - Assist with meals as needed  - Monitor I&O, weight, and lab values if indicated  - Obtain nutrition services referral as needed  Outcome: Progressing     Problem: METABOLIC, FLUID AND ELECTROLYTES - ADULT  Goal: Electrolytes maintained within normal limits  Description: INTERVENTIONS:  - Monitor labs and assess patient for signs and symptoms of electrolyte imbalances  - Administer electrolyte replacement as ordered  - Monitor response to electrolyte replacements, including repeat lab results as appropriate  - Instruct patient on fluid and nutrition as appropriate  Outcome: Progressing  Goal: Fluid balance maintained  Description: INTERVENTIONS:  - Monitor labs   - Monitor I/O and WT  - Instruct patient on fluid and nutrition as appropriate  - Assess for signs & symptoms of volume excess or deficit  Outcome: Progressing     Problem: SKIN/TISSUE INTEGRITY - ADULT  Goal: Skin Integrity remains intact(Skin Breakdown Prevention)  Description: Assess:  -Perform [place] assessment every shift  -Clean and moisturize skin every day  -Inspect skin when repositioning, toileting, and assisting with ADLS  -Assess extremities for adequate circulation and sensation     Bed Management:  -Have minimal linens on bed & keep smooth, unwrinkled  -Change linens as needed when moist or perspiring  -Avoid sitting or lying in one position for more than 2 hours while in bed  -Keep HOB at 45 degrees     Toileting:  -Offer bedside commode  -Assess for incontinence every 2 hours  -Use incontinent care products after each incontinent episode such as brief    Activity:  -Mobilize patient 3-4 times a day  -Encourage activity and walks on unit  -Encourage or provide ROM exercises   -Turn and reposition patient every 2 Hours  -Use appropriate equipment to lift or move patient in bed  -Instruct/ Assist with weight shifting every 2 when out of bed in chair  -Consider limitation of chair time 2 hour intervals    Skin Care:  -Avoid use of baby powder, tape, friction and shearing, hot water or constrictive clothing  -Relieve pressure over bony prominences using pillows  -Do not massage red bony areas    Next Steps:  -Teach patient strategies to minimize risks such as walker  -Consider consults to  interdisciplinary teams such as wound  Outcome: Progressing  Goal: Incision(s), wounds(s) or drain site(s) healing without S/S of infection  Description: INTERVENTIONS  - Assess and document dressing, incision, wound bed, drain sites and surrounding tissue  - Provide patient and family education  - Perform skin care/dressing changes every PRN  Outcome: Progressing     Problem: MUSCULOSKELETAL - ADULT  Goal: Maintain or return mobility to safest level of function  Description: INTERVENTIONS:  - Assess patient's ability to carry out ADLs; assess patient's baseline for ADL function and identify physical deficits which impact ability to perform ADLs (bathing, care of mouth/teeth, toileting, grooming, dressing, etc )  - Assess/evaluate cause of self-care deficits   - Assess range of motion  - Assess patient's mobility  - Assess patient's need for assistive devices and provide as appropriate  - Encourage maximum independence but intervene and supervise when necessary  - Involve family in performance of ADLs  - Assess for home care needs following discharge   - Consider OT consult to assist with ADL evaluation and planning for discharge  - Provide patient education as appropriate  Outcome: Progressing

## 2022-10-05 NOTE — ASSESSMENT & PLAN NOTE
· Does have history of suicide attempt a currently does not show any signs of thoughts of self-harm  · Psychiatry consultation appreciated  · Continue Seroquel 50 mg twice a day   · Zyprexa 5 mg by mouth every 6 hours PRN agitation

## 2022-10-05 NOTE — PROGRESS NOTES
Progress Note - Infectious Disease   Isra Perez 45 y o  female MRN: [id number]  Unit/Bed#: [facility] 68 2 -01 Encounter: [id number]      Impression/Plan:  1  SIRS vs Sepsis, not present on admission, a/e/b fever, tachycardia  Mild leukocytosis   Suspect this is likely due to Gram-positive bacteremia   No recurrent high fever spike  No lactic acidosis   COVID-19 negative   UA benign   Status post lumbar puncture for altered mental status with negative ME panel   Clinically much improved   -antibiotics as below   -follow-up cultures and adjust antibiotics as needed  -monitor temperature and hemodynamics  -recheck CBC and BMP in a m      2  MSSA bacteremia  2 of 2 admission blood cultures postive for MSSA  Patient was found down for prolonged period of time with evidence of multiple wounds on admission  Suspect cutaneous vs endovascular source, likely phlebitis as there is report of RUE erythema surrounding prior IV in addition to palpable cord in left antecub  TTE negative for obvious vegetation  Repeat blood cultures x 2 sets are negative at 24h  CHERYLE planned today now on hold due to tenuous respiratory status  Repeat transthoracic echocardiogram limited but negative for valvular vegetation  -increase cefazolin to 2 g IV q 8 hours with improved renal function  -recommend CHERYLE if able to safely do  -plan 2-4 week course of IV antibiotics     3  Toxic Metabolic encephalopathy   Likely multifactorial etiology with uremia playing a role   This was initially thought to be secondary to gabapentin and morphine use in the setting of renal failure, hypotension, rhabdomyolysis   MRI brain negative   Lumbar puncture with opening pressure 26 with negative ME panel  CSF fluid culture not collected  The most likely to be secondary to metabolic issues, medication effect, and possible Wernicke's encephalopathy    Psychiatric issues also likely playing a role  -monitor cognition  -treat metabolic issues and psychiatric issues  -no additional ID workup needed     4  Acute renal failure   Creatinine on admission 5 88 with CK over 32,000  Creatinine reached plateau, now down WSMBSTCR 1 3 today   Likely multifactorial etiology in the setting of rhabdomyolysis and dehydration  Likely ANUJA/ATN   The renal function is now dramatically improved  -renally dose antibiotics as above  -close Nephrology follow-up  -recheck BMP     5  Tobacco abuse   Encourage cessation as recommended by primary team      6  Antibiotic allergy  Hx of hives with PCNs  Tolerated ceftriaxone and now tolerating cefazolin  Monitor for MUSA      7  Acute hypoxic respiratory failure- CT scan suggest new areas of ground-glass opacification of unclear significance   Possible aspiration pneumonitis versus pneumonia  Procalcitonin level is relatively low  No longer requiring oxygen support   -monitor respiratory status  -recheck chest x-ray     8  Diarrhea-possibly all antibiotic related  Stool for C diff is negative  Started on probiotic   -discontinue oral vancomycin  -monitor stool output    Discussed the above management plan with the primary service    Antibiotics:  Cefazolin 9    Subjective:  Patient has no fever, chills, sweats; no nausea, vomiting, diarrhea; no cough, shortness of breath; no pain  No new symptoms  She is more alert interactive and verbalizing today    Objective:  Vitals:  Temp:  [97 9 °F (36 6 °C)-99 1 °F (37 3 °C)] 99 1 °F (37 3 °C)  HR:  [] 105  Resp:  [18-20] 20  BP: (121-140)/(78-86) 140/86  SpO2:  [94 %-98 %] 94 %  Temp (24hrs), Av 5 °F (36 9 °C), Min:97 9 °F (36 6 °C), Max:99 1 °F (37 3 °C)  Current: Temperature: 99 1 °F (37 3 °C)    Physical Exam:   General Appearance:  Alert, interactive, confused, nontoxic, no acute distress  Throat: Oropharynx moist without lesions      Lungs:   Clear to auscultation bilaterally; no wheezes, rhonchi or rales; respirations unlabored   Heart:  Tachycardia; no murmur, rub or gallop   Abdomen:   Soft, non-tender, non-distended, positive bowel sounds  Extremities: No clubbing, cyanosis or edema   Skin: No new rashes or lesions  No draining wounds noted  Labs, Imaging, & Other studies:   All pertinent labs and imaging studies were personally reviewed  Results from last 7 days   Lab Units 10/05/22  0449 10/04/22  0444 10/03/22  0507   WBC Thousand/uL 11 04* 18 05* 20 32*   HEMOGLOBIN g/dL 9 9* 10 8* 12 2   PLATELETS Thousands/uL 326 320 341     Results from last 7 days   Lab Units 10/05/22  0449 10/04/22  0444 10/03/22  0913 10/02/22  0505 10/01/22  0507 10/01/22  0145   SODIUM mmol/L 138 146 147   < > 144 146   POTASSIUM mmol/L 3 3* 2 9* 3 6   < > 3 2* 3 1*   CHLORIDE mmol/L 107 102 100   < > 102 104   CO2 mmol/L 26 37* 35*   < > 33* 34*   BUN mg/dL 8 34* 34*   < > 39* 41*   CREATININE mg/dL 0 79 3 07* 3 73*   < > 5 78* 5 88*   EGFR ml/min/1 73sq m 85 16 13   < > 7 7   CALCIUM mg/dL 9 0 9 7 9 7   < > 9 0 9 3   AST U/L  --  30  --   --  56* 66*   ALT U/L  --  18  --   --  47 45   ALK PHOS U/L  --  78  --   --  76 91    < > = values in this interval not displayed  Results from last 7 days   Lab Units 10/03/22  1823 09/28/22  1145 09/28/22  1144   BLOOD CULTURE   --  No Growth After 5 Days  No Growth After 5 Days     C DIFF TOXIN B BY PCR  Negative  --   --      Results from last 7 days   Lab Units 10/04/22  0444   PROCALCITONIN ng/ml 0 26*

## 2022-10-05 NOTE — PLAN OF CARE
Problem: PAIN - ADULT  Goal: Verbalizes/displays adequate comfort level or baseline comfort level  Description: Interventions:  - Encourage patient to monitor pain and request assistance  - Assess pain using appropriate pain scale  - Administer analgesics based on type and severity of pain and evaluate response  - Implement non-pharmacological measures as appropriate and evaluate response  - Consider cultural and social influences on pain and pain management  - Notify physician/advanced practitioner if interventions unsuccessful or patient reports new pain  Outcome: Progressing     Problem: INFECTION - ADULT  Goal: Absence or prevention of progression during hospitalization  Description: INTERVENTIONS:  - Assess and monitor for signs and symptoms of infection  - Monitor lab/diagnostic results  - Monitor all insertion sites, i e  indwelling lines, tubes, and drains  - Monitor endotracheal if appropriate and nasal secretions for changes in amount and color  - [place] appropriate cooling/warming therapies per order  - Administer medications as ordered  - Instruct and encourage patient and family to use good hand hygiene technique  - Identify and instruct in appropriate isolation precautions for identified infection/condition  Outcome: Progressing     Problem: SAFETY ADULT  Goal: Patient will remain free of falls  Description: INTERVENTIONS:  - Educate patient/family on patient safety including physical limitations  - Instruct patient to call for assistance with activity   - Consult OT/PT to assist with strengthening/mobility   - Keep Call bell within reach  - Keep bed low and locked with side rails adjusted as appropriate  - Keep care items and personal belongings within reach  - Initiate and maintain comfort rounds  - Make Fall Risk Sign visible to staff  - Offer Toileting every 2 Hours, in advance of need  - Initiate/Maintain bed alarm  - Obtain necessary fall risk management equipment: walker  - Apply yellow socks and bracelet for high fall risk patients  - Consider moving patient to room near nurses station  Outcome: Progressing  Goal: Maintain or return to baseline ADL function  Description: INTERVENTIONS:  -  Assess patient's ability to carry out ADLs; assess patient's baseline for ADL function and identify physical deficits which impact ability to perform ADLs (bathing, care of mouth/teeth, toileting, grooming, dressing, etc )  - Assess/evaluate cause of self-care deficits   - Assess range of motion  - Assess patient's mobility; develop plan if impaired  - Assess patient's need for assistive devices and provide as appropriate  - Encourage maximum independence but intervene and supervise when necessary  - Involve family in performance of ADLs  - Assess for home care needs following discharge   - Consider OT consult to assist with ADL evaluation and planning for discharge  - Provide patient education as appropriate  Outcome: Progressing  Goal: Maintains/Returns to pre admission functional level  Description: INTERVENTIONS:  - Perform BMAT or MOVE assessment daily    - Set and communicate daily mobility goal to care team and patient/family/caregiver  - Collaborate with rehabilitation services on mobility goals if consulted  - Perform Range of Motion 4-6 times a day  - Reposition patient every 2 hours    - Dangle patient 3-4 times a day  - Stand patient 3 times a day  - Ambulate patient 3-4 times a day  - Out of bed for toileting  - Record patient progress and toleration of activity level   Outcome: Progressing     Problem: DISCHARGE PLANNING  Goal: Discharge to home or other facility with appropriate resources  Description: INTERVENTIONS:  - Identify barriers to discharge w/patient and caregiver  - Arrange for needed discharge resources and transportation as appropriate  - Identify discharge learning needs (meds, wound care, etc )  - Arrange for interpretive services to assist at discharge as needed  - Refer to Case Management Department for coordinating discharge planning if the patient needs post-hospital services based on physician/advanced practitioner order or complex needs related to functional status, cognitive ability, or social support system  Outcome: Progressing     Problem: Knowledge Deficit  Goal: Patient/family/caregiver demonstrates understanding of disease process, treatment plan, medications, and discharge instructions  Description: Complete learning assessment and assess knowledge base    Interventions:  - Provide teaching at level of understanding  - Provide teaching via preferred learning methods  Outcome: Progressing     Problem: Potential for Falls  Goal: Patient will remain free of falls  Description: INTERVENTIONS:  - Educate patient/family on patient safety including physical limitations  - Instruct patient to call for assistance with activity   - Consult OT/PT to assist with strengthening/mobility   - Keep Call bell within reach  - Keep bed low and locked with side rails adjusted as appropriate  - Keep care items and personal belongings within reach  - Initiate and maintain comfort rounds  - Make Fall Risk Sign visible to staff  - Offer Toileting every 2 Hours, in advance of need  - Initiate/Maintain bed alarm  - Obtain necessary fall risk management equipment: walker  - Apply yellow socks and bracelet for high fall risk patients  - Consider moving patient to room near nurses station  Outcome: Progressing

## 2022-10-05 NOTE — PHYSICAL THERAPY NOTE
PHYSICAL THERAPY NOTE          Patient Name: Yanet Mascorro  [id number] Date: 10/5/2022  Time: 4065-8107 (flowsheet entered in error)       10/05/22 1337   PT Last Visit   PT Visit Date 10/05/22   Note Type   Note Type Treatment   Pain Assessment   Pain Assessment Tool FLACC   Pain Location/Orientation Orientation: Right;[place] Leg   Hospital Pain Intervention(s) Ambulation/increased activity; Emotional support; Rest   Pain Rating: FLACC (Rest) - Face 0   Pain Rating: FLACC (Rest) - Legs 0   Pain Rating: FLACC (Rest) - Activity 0   Pain Rating: FLACC (Rest) - Cry 0   Pain Rating: FLACC (Rest) - Consolability 0   Score: FLACC (Rest) 0   Pain Rating: FLACC (Activity) - Face 1   Pain Rating: FLACC (Activity) - Legs 1   Pain Rating: FLACC (Activity) - Activity 1   Pain Rating: FLACC (Activity) - Cry 1   Pain Rating: FLACC (Activity) - Consolability 0   Score: FLACC (Activity) 4   Restrictions/Precautions   Other Precautions Cognitive; Chair Alarm; Bed Alarm; Restraints;Multiple lines; Fall Risk;Pain   General   Chart Reviewed Yes   Response to Previous Treatment Patient unable to report, no changes reported from family or staff   Cognition   Overall Cognitive Status Impaired   Arousal/Participation Alert   Attention Difficulty attending to directions   Orientation Level Oriented to person;Disoriented to place; Disoriented to situation;Disoriented to time   Following Commands Follows one step commands inconsistently   Comments unable to make needs known/ express herself   Bed Mobility   Supine to Sit 2  Maximal assistance   Additional items Assist x 2;HOB elevated; Bedrails; Increased time required;Verbal cues;LE management; Other   Sit to Supine 2  Maximal assistance   Additional items Assist x 2; Increased time required;LE management;Verbal cues; Other   Additional Comments increased assist needed due to impaired cognition   Transfers   Sit to Stand 4  Minimal assistance   Additional items Assist x 2; Increased time required;Verbal cues; Other  (RW)   Stand to Sit 4  Minimal assistance   Additional items Assist x 2; Increased time required;Verbal cues; Other  (RW)   Toilet transfer 4  Minimal assistance   Additional items Assist x 2; Increased time required;Verbal cues; Commode; Other  (RW)   Ambulation/Elevation   Gait pattern Poor UE support; Improper Weight shift; Inconsistent yan; Ataxia; Decreased R stance  (impulsive)   Gait Assistance 3  Moderate assist  (max A at times)   Additional items Assist x 2;Verbal cues; Tactile cues   Assistive Device Rolling walker   Distance 4', 8'   Balance   Static Standing Poor   Dynamic Standing Poor -   Ambulatory Poor -   Endurance Deficit   Endurance Deficit No  (vitals post amb: 112/93, 110-130s, 97%)   Activity Tolerance   Activity Tolerance Treatment limited secondary to medical complications (Comment)  (cognition)   Medical Staff Made Aware Ana OT   Nurse Made Aware Aria RN   Assessment   Prognosis Guarded   Problem List Decreased strength; Impaired balance;Decreased cognition;Decreased safety awareness; Impaired judgement;Decreased skin integrity;Pain;Decreased coordination;Decreased mobility   Assessment Lonza Steen was seen for a f/u session  Unfortunately pt w decline in function compared to prev sessions primarily due to cognitive deficits  Inconsistently follows commands and participates in mobility  Presents w deficits of cognition, balance, coordination, strength, pain  Unable to ambulate household distances or safely complete mobility and ADLs without Ax2  Would recommend STR vs LTC pending progress     Barriers to Discharge Inaccessible home environment;Decreased caregiver support   Goals   Patient Goals none stated   STG Expiration Date 10/14/22   Short Term Goal #1 will be able to: 1) inc strength & balance by 1/2 grade to improve overall functional mobility & dec fall risk; 2) inc bed mobility to minAx1 for pt to be able to get in/OOB safely w/ proper techniques 100% of the time, to dec caregiver burden & safely function at home; 3) inc transfers to minAx1 for pt to transition safely from one surface to another w/o % of the time, to dec caregiver burden & safely function at home; 4) inc amb w/ appropriate AD approx  >50' w/ minAx1 for pt to ambulate as tolerated w/o any % of the time, to dec caregiver burden & safely function at home; 5) negotiate stairs w/ minAx1 for inc safety during stair mgt inside/outside of home & dec caregiver burden; 6) pt/caregiver ed   PT Treatment Day 7   Plan   Treatment/Interventions Functional transfer training;LE strengthening/ROM; Therapeutic exercise;Cognitive reorientation;Patient/family training;Equipment eval/education; Bed mobility; Compensatory technique education;Continued evaluation;Gait training;Spoke to nursing;OT   Progress Slow progress, medical status limitations   PT Frequency 3-5x/wk   Recommendation   PT Discharge Recommendation Post acute rehabilitation services  (may need transition to LTC based on progress)   AM-PAC Basic Mobility Inpatient   Turning in Bed Without Bedrails 2   Lying on Back to Sitting on Edge of Flat Bed 2   Moving Bed to Chair 1   Standing Up From Chair 2   Walk in Room 1   Climb 3-5 Stairs 1   Basic Mobility Inpatient Raw Score 9   Turning Head Towards Sound 2   Follow Simple Instructions 2   Low Function Basic Mobility Raw Score 13   Low Function Basic Mobility Standardized Score 20 14   Highest Level Of Mobility   -HLM Goal 3: Sit at edge of bed   JH-HLM Achieved 6: Walk 10 steps or more   Education   Education Provided Mobility training;Assistive device   Patient Reinforcement needed   End of Consult   Patient Position at End of Consult Supine;Bed/Chair alarm activated; Other (comment)   End of Consult Comments bl soft wrist restraints on       [employer]

## 2022-10-05 NOTE — ASSESSMENT & PLAN NOTE
· Acute encephalopathy secondary to gabapentin and morphine with subsequent renal failure, potentially component of psychiatric illness given history and bacteremia  · Consider hospital delirium due to prolong stay  · Received course of thiamine concern for Wernicke's encephalopathy  · LP and MRI negative for acute pathology, neurology evaluated  · Discussed with neurology, patient likely will have slow improvement if any over the coming weeks  · Continue dysphagia diet  · Speech therapy to continue follow-up  · With no significant improvement, consider repeat psych and Neurology consultation as renal function improved

## 2022-10-06 ENCOUNTER — TELEPHONE (OUTPATIENT)
Dept: FAMILY MEDICINE CLINIC | Facility: CLINIC | Age: 52
End: 2022-10-06

## 2022-10-06 LAB
ANION GAP SERPL CALCULATED.3IONS-SCNC: 7 MMOL/L (ref 4–13)
BASOPHILS # BLD AUTO: 0.13 THOUSANDS/ÂΜL (ref 0–0.1)
BASOPHILS NFR BLD AUTO: 1 % (ref 0–1)
BUN SERPL-MCNC: 24 MG/DL (ref 5–25)
CALCIUM SERPL-MCNC: 10.9 MG/DL (ref 8.3–10.1)
CHLORIDE SERPL-SCNC: 104 MMOL/L (ref 96–108)
CO2 SERPL-SCNC: 29 MMOL/L (ref 21–32)
CREAT SERPL-MCNC: 2.03 MG/DL (ref 0.6–1.3)
EOSINOPHIL # BLD AUTO: 0.29 THOUSAND/ÂΜL (ref 0–0.61)
EOSINOPHIL NFR BLD AUTO: 2 % (ref 0–6)
ERYTHROCYTE [DISTWIDTH] IN BLOOD BY AUTOMATED COUNT: 14.2 % (ref 11.6–15.1)
GFR SERPL CREATININE-BSD FRML MDRD: 27 ML/MIN/1.73SQ M
GLUCOSE SERPL-MCNC: 153 MG/DL (ref 65–140)
HCT VFR BLD AUTO: 33.4 % (ref 34.8–46.1)
HGB BLD-MCNC: 10.4 G/DL (ref 11.5–15.4)
IMM GRANULOCYTES # BLD AUTO: 0.13 THOUSAND/UL (ref 0–0.2)
IMM GRANULOCYTES NFR BLD AUTO: 1 % (ref 0–2)
LYMPHOCYTES # BLD AUTO: 3.1 THOUSANDS/ÂΜL (ref 0.6–4.47)
LYMPHOCYTES NFR BLD AUTO: 22 % (ref 14–44)
MCH RBC QN AUTO: 32.2 PG (ref 26.8–34.3)
MCHC RBC AUTO-ENTMCNC: 31.1 G/DL (ref 31.4–37.4)
MCV RBC AUTO: 103 FL (ref 82–98)
MONOCYTES # BLD AUTO: 0.68 THOUSAND/ÂΜL (ref 0.17–1.22)
MONOCYTES NFR BLD AUTO: 5 % (ref 4–12)
NEUTROPHILS # BLD AUTO: 10.03 THOUSANDS/ÂΜL (ref 1.85–7.62)
NEUTS SEG NFR BLD AUTO: 69 % (ref 43–75)
NRBC BLD AUTO-RTO: 0 /100 WBCS
PLATELET # BLD AUTO: 318 THOUSANDS/UL (ref 149–390)
PMV BLD AUTO: 11.4 FL (ref 8.9–12.7)
POTASSIUM SERPL-SCNC: 3.9 MMOL/L (ref 3.5–5.3)
RBC # BLD AUTO: 3.23 MILLION/UL (ref 3.81–5.12)
SODIUM SERPL-SCNC: 140 MMOL/L (ref 135–147)
WBC # BLD AUTO: 14.36 THOUSAND/UL (ref 4.31–10.16)

## 2022-10-06 RX ORDER — CEFAZOLIN SODIUM 2 G/50ML
2000 SOLUTION INTRAVENOUS EVERY 12 HOURS
Status: DISCONTINUED | OUTPATIENT
Start: 2022-10-06 | End: 2022-10-11

## 2022-10-06 RX ORDER — GABAPENTIN 100 MG/1
100 CAPSULE ORAL 3 TIMES DAILY
Status: DISCONTINUED | OUTPATIENT
Start: 2022-10-06 | End: 2022-10-11

## 2022-10-06 RX ADMIN — GABAPENTIN 100 MG: 250 SUSPENSION ORAL at 08:59

## 2022-10-06 RX ADMIN — CEFAZOLIN SODIUM 2000 MG: 2 SOLUTION INTRAVENOUS at 01:38

## 2022-10-06 RX ADMIN — LOPERAMIDE HCL 2 MG: 1 SOLUTION ORAL at 22:18

## 2022-10-06 RX ADMIN — CEFAZOLIN SODIUM 2000 MG: 2 SOLUTION INTRAVENOUS at 14:33

## 2022-10-06 RX ADMIN — METOPROLOL TARTRATE 25 MG: 25 TABLET, FILM COATED ORAL at 08:51

## 2022-10-06 RX ADMIN — AMLODIPINE BESYLATE 10 MG: 10 TABLET ORAL at 08:51

## 2022-10-06 RX ADMIN — LOPERAMIDE HCL 2 MG: 1 SOLUTION ORAL at 10:57

## 2022-10-06 RX ADMIN — GABAPENTIN 100 MG: 100 CAPSULE ORAL at 17:45

## 2022-10-06 RX ADMIN — Medication 250 MG: at 08:51

## 2022-10-06 RX ADMIN — QUETIAPINE FUMARATE 50 MG: 25 TABLET ORAL at 17:45

## 2022-10-06 RX ADMIN — HEPARIN SODIUM 5000 UNITS: 5000 INJECTION INTRAVENOUS; SUBCUTANEOUS at 22:22

## 2022-10-06 RX ADMIN — HEPARIN SODIUM 5000 UNITS: 5000 INJECTION INTRAVENOUS; SUBCUTANEOUS at 06:28

## 2022-10-06 RX ADMIN — HEPARIN SODIUM 5000 UNITS: 5000 INJECTION INTRAVENOUS; SUBCUTANEOUS at 14:39

## 2022-10-06 RX ADMIN — QUETIAPINE FUMARATE 50 MG: 25 TABLET ORAL at 08:51

## 2022-10-06 RX ADMIN — MICONAZOLE NITRATE 1 APPLICATION: 20 CREAM TOPICAL at 17:45

## 2022-10-06 RX ADMIN — MICONAZOLE NITRATE 1 APPLICATION: 20 CREAM TOPICAL at 08:52

## 2022-10-06 RX ADMIN — Medication 1 PATCH: at 08:52

## 2022-10-06 RX ADMIN — GABAPENTIN 100 MG: 100 CAPSULE ORAL at 21:56

## 2022-10-06 RX ADMIN — METOPROLOL TARTRATE 25 MG: 25 TABLET, FILM COATED ORAL at 21:56

## 2022-10-06 RX ADMIN — Medication 250 MG: at 17:45

## 2022-10-06 NOTE — ASSESSMENT & PLAN NOTE
· ANUJA/ATN secondary to rhabdomyolysis    No evidence of renal obstruction on imaging  · Nephrology following  · No emergent need for hemodialysis  · Renal function improving, nephrology signed off    Results from last 7 days   Lab Units 10/06/22  0622 10/05/22  7355 10/05/22  0449 10/04/22  0444 10/03/22  0913 10/02/22  0505 10/01/22  0507 10/01/22  0145 09/30/22  0137   BUN mg/dL 24 30* 8 34* 34* 36* 39* 41* 40*   CREATININE mg/dL 2 03* 2 55* 0 79 3 07* 3 73* 4 79* 5 78* 5 88* 6 37*   EGFR ml/min/1 73sq m 27 20 85 16 13 9 7 7 6

## 2022-10-06 NOTE — PHYSICAL THERAPY NOTE
Physical Therapy Treatment Note     10/06/22 1042   PT Last Visit   PT Visit Date 10/06/22   Note Type   Note Type Treatment   Pain Assessment   Pain Assessment Tool FLACC   Pain Score 6   Pain Location/Orientation Orientation: Right;[place] Foot; [place] Leg;Other (Comment)  ([place])   Restrictions/Precautions   Weight Bearing Precautions Per Order No   Other Precautions Cognitive; Bed Alarm; Chair Alarm; Restraints; Fall Risk;Telemetry;Multiple lines;Pain   General   Chart Reviewed Yes   Family/Caregiver Present No   Subjective   Subjective Pt  in bed upon entry  Agreeable to PT  Pt  responsive to questions ask  Noted patient answers with "yes" but never said "no" patient simply would not respond  Bed Mobility   Rolling R 4  Minimal assistance   Additional items Assist x 1; Increased time required; Bedrails;Verbal cues   Rolling L 4  Minimal assistance   Additional items Assist x 1;Bedrails; Increased time required;Verbal cues   Supine to Sit 4  Minimal assistance   Additional items Assist x 2; Increased time required;LE management;Verbal cues  (2nd A mainly for safety)   Sit to Supine 4  Minimal assistance   Additional items Assist x 2; Increased time required;LE management;Verbal cues; Bedrails  (2nd A mainly for safety)   Additional Comments patient requires tactile and VCs to initiate tasks   Transfers   Sit to Stand 4  Minimal assistance   Additional items Assist x 2; Increased time required;Verbal cues   Stand to Sit 4  Minimal assistance   Additional items Assist x 2; Increased time required;Verbal cues   Additional Comments Pt  noted with difficulty WB through RLE and reported pain when asked   Ambulation/Elevation   Gait pattern Improper Weight shift; Antalgic;Decreased foot clearance;Decreased R stance; Inconsistent yan; Excessively slow;Decreased heel strike   Gait Assistance 4  Minimal assist   Additional items Assist x 2;Verbal cues   Assistive Device Rolling walker   Distance 4ft x 2   Balance   Static Sitting Fair   Dynamic Sitting Fair -   Static Standing Poor   Dynamic Standing Poor -   Ambulatory Poor -   Endurance Deficit   Endurance Deficit Yes   Endurance Deficit Description Pain   Activity Tolerance   Activity Tolerance Patient tolerated treatment well;Patient limited by pain   Medical Staff Made Aware LOVE Damon was also notified   Nurse Made Aware yes   Assessment   Prognosis Guarded   Problem List Decreased strength;Decreased range of motion;Decreased endurance; Impaired balance;Decreased mobility; Decreased coordination;Decreased cognition; Impaired judgement;Pain   Assessment Pt  noted with slightly more improvement with cognition and able to respond appropriately  However noted patient responded only with an yes  Pt  specifcally reported she has pain in her ankle consistently when asked but did not repsond when asked if she has pain in her hip or knee  Noted patient unable to WB through her RLE when standing and for ambulation  Antalgic ambulation noted  pt  needed assist in negotiating RW during ambulation  pt  reported with an yes when asked if she felt safe with the therapists and the environment she is in multiple times and patient reported with a yes  Pt  needed tactile cues along with VCs to initiate tasks and followed instructions when given  Pt  cooperative with all mobility  Pt  requires A x 2 mainly due to poor cognition  pt  needed total A for pericare and patient noted to have loose BMs often twice during the session  pt  presents with deficits in her mobility, cognition, balance, and coordination and is a high of fall  pain in her RLE is also a limiting factor to her mobility  Recommend STR vs LTC pending progress  Reported to LOVE Crowell and to CHRISTIANO singh about patient's status and concerns about patient     Barriers to Discharge Inaccessible home environment;Decreased caregiver support   Goals   Patient Goals None reported   STG Expiration Date 10/14/22   PT Treatment Day 8   Plan Treatment/Interventions Functional transfer training;Cognitive reorientation;Patient/family training;Gait training;Bed mobility; Equipment eval/education;Spoke to nursing;Spoke to case management;OT   Progress Slow progress, cognitive deficits   PT Frequency 3-5x/wk   Recommendation   PT Discharge Recommendation Post acute rehabilitation services  (may transition to LTC pending progress)   Equipment Recommended   (TBD)   AM-PAC Basic Mobility Inpatient   Turning in Bed Without Bedrails 3   Lying on Back to Sitting on Edge of Flat Bed 3   Moving Bed to Chair 3   Standing Up From Chair 3   Walk in Room 2   Climb 3-5 Stairs 2   Basic Mobility Inpatient Raw Score 16   Basic Mobility Standardized Score 38 32   Turning Head Towards Sound 3   Follow Simple Instructions 3   Low Function Basic Mobility Raw Score 22   Low Function Basic Mobility Standardized Score 35 85   Highest Level Of Mobility   JH-HLM Goal 5: Stand one or more mins   JH-HLM Achieved 6: Walk 10 steps or more   End of Consult   Patient Position at End of Consult All needs within reach;Bed/Chair alarm activated;Supine         [place] AM-PAC basic mobility standardized score less than 42 9 suggest the patient may benefit from discharge to post-acute rehab services

## 2022-10-06 NOTE — OCCUPATIONAL THERAPY NOTE
Occupational Therapy Progress Note     Patient Name: Chante Small  Today's Date: 10/6/2022  Problem List  Principal Problem:    Toxic metabolic encephalopathy  Active Problems:    Depression    Tobacco abuse    DDD (degenerative disc disease), lumbosacral    ARF (acute renal failure) (HCC)    Transaminitis    Abnormal CT of the chest    Traumatic rhabdomyolysis (HCC)    D-dimer, elevated    Leg edema, right    Localized swelling of right upper extremity    Bacteremia due to methicillin susceptible Staphylococcus aureus (MSSA)    Pulmonary edema    Aspiration pneumonitis (HCC)    Hypokalemia    Hypernatremia    Diarrhea        10/06/22 0953   OT Last Visit   OT Visit Date 10/06/22   Note Type   Note Type Treatment   Restrictions/Precautions   Weight Bearing Precautions Per Order No   Other Precautions Cognitive; Chair Alarm; Bed Alarm; Restraints;Multiple lines; Fall Risk;Pain   Pain Assessment   Pain Assessment Tool FLACC   Pain Location/Orientation Orientation: Right;[place] Foot   Pain Rating: FLACC (Rest) - Face 0   Pain Rating: FLACC (Rest) - Legs 0   Pain Rating: FLACC (Rest) - Activity 0   Pain Rating: FLACC (Rest) - Cry 0   Pain Rating: FLACC (Rest) - Consolability 0   Score: FLACC (Rest) 0   Pain Rating: FLACC (Activity) - Face 2   Pain Rating: FLACC (Activity) - Legs 1   Pain Rating: FLACC (Activity) - Activity 1   Pain Rating: FLACC (Activity) - Cry 1   Pain Rating: FLACC (Activity) - Consolability 1   Score: FLACC (Activity) 6   ADL   Where Assessed Edge of bed   Grooming Assistance 2  Maximal Assistance   Grooming Deficit Setup;Verbal cueing;Supervision/safety; Increased time to complete;Wash/dry face; Wash/dry hands   Grooming Comments Handed pt wipe with instruction to wash face, assisted (hand over hand) pt's hand to face, in which she initiates washing   LB Bathing Assistance 1  Total Assistance   [address] 1  Total Assistance   [address] [address]; Thread LUE; Increased time to complete;Verbal cueing   LB Dressing Assistance 1  Total Assistance   LB Dressing Deficit Don/doff R sock; Don/doff L sock   Toileting Assistance  1  Total Assistance   Toileting Deficit Setup;Verbal cueing;Steadying;Supervison/safety; Increased time to complete; Bedside commode;Clothing management up;Clothing management down;Perineal hygiene   Bed Mobility   Rolling R 4  Minimal assistance   Additional items Assist x 1; Increased time required; Impulsive;Verbal cues;LE management; Bedrails   Rolling L 4  Minimal assistance   Additional items Assist x 1; Increased time required; Impulsive;Verbal cues;LE management; Bedrails   Supine to Sit 4  Minimal assistance   Additional items Assist x 2;HOB elevated; Bedrails; Increased time required;Verbal cues;LE management; Other  (Assistance required to initiate task)   Sit to Supine 4  Minimal assistance   Additional items Assist x 2;HOB elevated; Bedrails; Increased time required;Verbal cues;LE management; Other   Additional Comments increase assist needed 2/2 impaired cognition   Transfers   Sit to Stand 4  Minimal assistance   Additional items Assist x 2;Bedrails; Increased time required;Verbal cues; Other  (HHA or FWW)   Stand to Sit 4  Minimal assistance   Additional items Assist x 2;Bedrails; Increased time required;Verbal cues; Other  (HHA or FWW)   Additional Comments Pt with difficulty WB through R LE   Functional Mobility   Functional Mobility 4  Minimal assistance   Additional Comments x2 with FWW   Additional items Rolling walker   Cognition   Overall Cognitive Status Impaired   Arousal/Participation Alert;Persistent stimuli required   Attention Difficulty attending to directions   Orientation Level Oriented to person   Memory Decreased recall of precautions;Decreased recall of recent events;Decreased short term memory;Decreased recall of biographical information   Following Commands Follows one step commands with increased time or repetition   Comments Able to answer some yes/no questions today   Activity Tolerance   Activity Tolerance Patient limited by pain;Treatment limited secondary to medical complications (Comment)  (Cognition)   Medical Staff Made Aware Elvia Delgado PTA, Nrsg, CM Jewels   Assessment   Assessment Patient participated in skilled OT session this date with interventions consisting of therapeutic activities, neuro-reeducation, and self-care/ADL training to increase B UE strength, functional endurance/activity tolerance, static/dynamic sitting/standing balance, and overall safety awareness to increase (I) with ADLs/IADLs to return to PLOF  Provided education on energy conservation techniques, deep breathing techniques, fall prevention strategies, and overall safety awareness with little learning capacity noted a this time  Pt presented with B restraints on B wrists, seated EOB between bed rails; had been incontinent of bowel  Please refer to flowsheet for functional performance  Pt demonstrating impaired ability to WB through R LE  When asked if pt was in pain, she audibly states "Yes" consistently  Applied pressure, in sitting, to R hip, R knee, and R ankle, with pt stating "yes" to R ankle re: pain, and no reply for other joints questioned  Pt able to answer some yes/no questions, with no response seems to be indicating a "no"  Brought up spouse, in which patient's facial expression change to a grimace and looking away  Attempted to ask f/u questions, with pt continuously grimacing almost  Asked pt if she felt safe at the hospital and with therapists, in which there was a strong verbal "yes" response  Asked pt if she felt safe at home, with pt making the same grimace as prior  Asked the same set of questions again with the exact same responses provided by patient  Did make RN and CM Jennine Skiff aware of situation  Patient requiring verbal cues for safety and verbal cues for pacing through activity steps   Patient continues to be functioning below baseline level, occupational performance remains limited secondary to factors listed above and increased risk for falls and injury  The patient's raw score on the AM-PAC Daily Activity inpatient short form is 8, standardized score is 29 04, less than 39 4  Patients at this level are likely to benefit from discharge to post-acute rehabilitation services  Please refer to the recommendation of the Occupational Therapist for safe discharge planning  Plan   Treatment Interventions ADL retraining;Functional transfer training;UE strengthening/ROM; Endurance training;Cognitive reorientation;Patient/family training;Equipment evaluation/education; Neuromuscular reeducation; Compensatory technique education;Continued evaluation; Energy conservation; Activityengagement   Goal Expiration Date 10/18/22   OT Treatment Day 6   OT Frequency 3-5x/wk   Recommendation   OT Discharge Recommendation Post acute rehabilitation services   [facility] Daily Activity Inpatient   Lower Body Dressing 1   Bathing 1   Toileting 1   Upper Body Dressing 1   Grooming 2   Eating 2   Daily Activity Raw Score 8   Turning Head Towards Sound 2   Follow Simple Instructions 2   Low Function Daily Activity Raw Score 12   Low Function Daily Activity Standardized Score 21 38   AM-PAC Applied Cognition Inpatient   Following a Speech/Presentation 2   Understanding Ordinary Conversation 2   Taking Medications 1   Remembering Where Things Are Placed or Put Away 1   Remembering List of 4-5 Errands 1   Taking Care of Complicated Tasks 1   Applied Cognition Raw Score 8   Applied Cognition Standardized Score 19 32     Vitor Calloway

## 2022-10-06 NOTE — PROGRESS NOTES
CARDIOLOGY INPATIENT FOLLOW-UP PROGRESS NOTE  *-*-*-*-*-*-*-*-*-*-*-*-*-*-*-*-*-*-*-*-*-*-*-*-*-*-*-*-*-*-*-*-*-*-*-*-*-*-*-*-*-*-*-*-*-*-*-*-*-*-*-*-*-*-  Coby Blount DATE: 10/06/22 7:25 PM   AUTHOR: Cece Newsome  PATIENT: Devin Zhang   1970    [id number]   74 y o    female  INPATIENT HOSPITALIST PHYSICIAN: Janny Abrams DO  DATE OF ADMISSION: 9/19/2022  8:04 AM; LENGTH OF STAY: 17 days  *-*-*-*-*-*-*-*-*-*-*-*-*-*-*-*-*-*-*-*-*-*-*-*-*-*-*-*-*-*-*-*-*-*-*-*-*-*-*-*-*-*-*-*-*-*-*-*-*-*-*-*-*-*-    CARDIOLOGY ASSESSMENT:  1  MSSA bacteremia 2/2 blood cultures positive  2  Acute respiratory failure, acute diastolic heart failure due to volume overload  3  Toxic metabolic encephalopathy  4  LV Dysfunction  5  Acute renal failure presumed due to ATN and severe rhabdomyolysis  6  Rhabdomyolysis  7   Depression    *-*-*-*-*-*-*-*-*-*-*-*-*-*-*-*-*-*-*-*-*-*-*-*-*-*-*-*-*-*-*-*-*-*-*-*-*-*-*-*-*-*-*-*-*-*-*-*-*-*-*-*-*-*-    CURRENT SCHEDULED MEDICATIONS:    Current Facility-Administered Medications:   •  acetaminophen (TYLENOL) tablet 650 mg, 650 mg, Oral, Q6H PRN, Atul Adan DO, 650 mg at 09/22/22 1040  •  ALPRAZolam (XANAX) tablet 0 5 mg, 0 5 mg, Oral, HS PRN, Myranda Robin MD  •  amLODIPine (NORVASC) tablet 10 mg, 10 mg, Oral, Daily, Del Fairchild MD, 10 mg at 10/06/22 7713  •  ceFAZolin (ANCEF) IVPB (premix in dextrose) 2,000 mg 50 mL, 2,000 mg, Intravenous, Q12H, Cristopher Alcazar MD, Last Rate: 100 mL/hr at 10/06/22 1433, 2,000 mg at 10/06/22 1433  •  gabapentin (NEURONTIN) capsule 100 mg, 100 mg, Oral, TID, Myranda Robin MD, 100 mg at 10/06/22 1745  •  heparin (porcine) subcutaneous injection 5,000 Units, 5,000 Units, Subcutaneous, Q8H [facility], [facility], 5,000 Units at 10/06/22 1439  •  hydrALAZINE (APRESOLINE) injection 5 mg, 5 mg, Intravenous, Q6H PRN, Pati Bolton DO, 5 mg at 10/01/22 1236  •  HYDROmorphone (DILAUDID) injection 0 5 mg, 0 5 mg, Intravenous, Q4H PRN, Janny Abrams DO, 0 5 mg at 10/01/22 0031  •  levalbuterol (Ladona Brood) inhalation solution 1 25 mg, 1 25 mg, Nebulization, Q4H PRN, Virginia Needle, DO  •  loperamide (IMODIUM) oral liquid 2 mg, 2 mg, Oral, 4x Daily PRN, Yasemin Owens MD, 2 mg at 10/06/22 1057  •  metoprolol tartrate (LOPRESSOR) tablet 25 mg, 25 mg, Oral, Q12H [facility], Yasemin Owens MD, 25 mg at 10/06/22 0851  •  moisture barrier miconazole 2% cream (aka VANDANA MOISTURE BARRIER ANTIFUNGAL CREAM), , Topical, BID, Sam Hill, DO, 1 application at 23/08/13 1745  •  nicotine (NICODERM CQ) 14 mg/24hr TD 24 hr patch 1 patch, 1 patch, Transdermal, Daily, Atul Adan, DO, 1 patch at 10/06/22 5853  •  OLANZapine (ZyPREXA ZYDIS) dispersible tablet 5 mg, 5 mg, Oral, Q6H PRN, Virginia Needle, DO, 5 mg at 10/02/22 2311  •  ondansetron (ZOFRAN) injection 4 mg, 4 mg, Intravenous, Q4H PRN, Blossom Rivera, DO  •  QUEtiapine (SEROquel) tablet 50 mg, 50 mg, Oral, BID, Yasemin Owens MD, 50 mg at 10/06/22 1745  •  saccharomyces boulardii (FLORASTOR) capsule 250 mg, 250 mg, Oral, BID, Yasemin Owens MD, 250 mg at 10/06/22 1745    PERTINENT LABS AND OTHER INVESTIGATIONS:  Sodium 140, potassium 3 9, BUN 24, creatinine 2 03, calcium 10 9 , WBC count 14 36, hemoglobin 10 4, hematocrit 33 4, absolute neutrophil count 10 03 last blood culture from September 28, 2022 did not identify organisms  2/2 blood cultures from September 26, 2022 showed Staph aureus  ECHOCARDIOGRAM AND OTHER CARDIAC TESTS RESULTS:  Limited echocardiogram from October 2, 2022 showed EF of 43% with mild global hypokinesis  There was some the regional wall motion variability  Overall quality of study was suboptimal   There was no definite evidence of endocarditis  There was no pulmonary hypertension or pericardial effusion  There was no significant valvular stenosis or regurgitation      Echocardiogram on September 27, 2022 showed normal LV function with EF of 63%   *-*-*-*-*-*-*-*-*-*-*-*-*-*-*-*-*-*-*-*-*-*-*-*-*-*-*-*-*-*-*-*-*-*-*-*-*-*-*-*-*-*-*-*-*-*-*-*-*-*-*-*-*-*-    I called and spoke to patient's  as well as to her daughter  Based on my evaluation, patient remains a high risk candidate for CHERYLE  She will likely need intubation and mechanical ventilation for this procedure  CHERYLE can be considered if there is a very high index of clinical suspicion that she still has an ongoing infection or if she is showing overt signs of endocarditis  Otherwise if short-term antibiotic therapy followed by surveillance cultures is feasible then would recommend this option  PLAN:  -- at this time we recommend continued antibiotic therapy and repeat blood cultures over the weekend with plan to do limited transthoracic echocardiogram on Monday to reassess LV function and evaluate for infection  -- if there is high index of suspicion and CHERYLE will influence the treatment, and patient's family is agreeable for general anaesthesia then she will be considered for the procedure next week  -- noting LV dysfunction on last echocardiogram recommend close monitoring for signs of heart failure and continued heart failure precautions and monitoring  Follow-up NT proBNP level can be considered with next blood work  -- meanwhile recommend continued evaluation and treatment for causes of patient's confusion as patient's daughter reports that she was functional and had no altered mental status until recently  -- cardiology will follow patient intermittently  Please reach out to rounding cardiologist if there is any change in clinical status or questions or concerns  *-*-*-*-*-*-*-*-*-*-*-*-*-*-*-*-*-*-*-*-*-*-*-*-*-*-*-*-*-*-*-*-*-*-*-*-*-*-*-*-*-*-*-*-*-*-*-*-*-*-*-*-*-*-  INTERVAL CHANGES / HISTORY OF PRESENT ILLNESS:   No acute events  Patient remains confused and in restraints  Is unable to reply to questions appropriately  Has been afebrile    There has been no report of chest pain or shortness of breath or fevers or other symptoms reported  Infectious disease specialist has recommended considering for CHERYLE for ruling out endocarditis  *-*-*-*-*-*-*-*-*-*-*-*-*-*-*-*-*-*-*-*-*-*-*-*-*-*-*-*-*-*-*-*-*-*-*-*-*-*-*-*-*-*-*-*-*-*-*-*-*-*-*-*-*-*    PERTINENT REVIEW OF SYMPTOMS:  As noted above in HPI    *-*-*-*-*-*-*-*-*-*-*-*-*-*-*-*-*-*-*-*-*-*-*-*-*-*-*-*-*-*-*-*-*-*-*-*-*-*-*-*-*-*-*-*-*-*-*-*-*-*-*-*-*-*-  VITAL SIGNS:  Vitals:    10/05/22 1958 10/06/22 0600 10/06/22 0735 10/06/22 1623   BP: 136/78  153/95 141/78   BP [place]       Pulse: 103  101    Resp: 18  17 16   Temp: 97 8 °F (36 6 °C)  98 6 °F (37 °C) 98 2 °F (36 8 °C)   TempSrc:       SpO2: 96%  98%    Weight:  51 9 kg (114 lb 6 7 oz)     Height:          Temp (24hrs), Av 2 °F (36 8 °C), Min:97 8 °F (36 6 °C), Max:98 6 °F (37 °C)  Current: Temperature: 98 2 °F (36 8 °C)      Weight    10/02/22 0600 10/02/22 1115 10/03/22 0600 10/04/22 0554   Weight: 63 5 kg (139 lb 15 9 oz) 63 kg (139 lb) 63 kg (138 lb 14 2 oz) 51 5 kg (113 lb 8 6 oz)    10/05/22 0536 10/06/22 0600   Weight: 50 8 kg (111 lb 15 9 oz) 51 9 kg (114 lb 6 7 oz)      Body mass index is 18 47 kg/m²  Body surface area is 1 58 meters squared  Wt Readings from Last 3 Encounters:   10/06/22 51 9 kg (114 lb 6 7 oz)   22 58 5 kg (129 lb)   08/10/22 58 5 kg (129 lb)      Intake/Output Summary (Last 24 hours) at 10/6/2022 1925  Last data filed at 10/5/2022 2218  Gross per 24 hour   Intake 960 ml   Output --   Net 960 ml          *-*-*-*-*-*-*-*-*-*-*-*-*-*-*-*-*-*-*-*-*-*-*-*-*-*-*-*-*-*-*-*-*-*-*-*-*-*-*-*-*-*-*-*-*-*-*-*-*-*-*-*-*-*-  PHYSICAL EXAM:  General Appearance:     awake but somnolent, frail, has some signs of agitation  Is in restraints  Head, Eyes, ENT:     has no obvious JVD  As signs of protein calorie malnutrition  Neck:   Supple, no carotid bruit or JVD   Back:     Symmetric, no curvature  Lungs:     Respirations unlabored  Decreased breath sounds without crackles  Chest wall:    No tenderness or deformity   Heart:    Regular rate and rhythm, no obvious murmur noted on limited examination  Abdomen:     Soft, non-tender,   Extremities:   Extremities warm, no cyanosis or edema    Skin:   Skin color, texture, turgor normal, no rashes or lesions     *-*-*-*-*-*-*-*-*-*-*-*-*-*-*-*-*-*-*-*-*-*-*-*-*-*-*-*-*-*-*-*-*-*-*-*-*-*-*-*-*-*-*-*-*-*-*-*-*-*-*-*-*-*-  TELEMETRY, LAST ECG:  Telemetry reviewed    Not on telemetry  Results for orders placed or performed during the hospital encounter of 09/19/22   ECG 12 lead   Result Value    Ventricular Rate 103    Atrial Rate 103    KY Interval 146    QRSD Interval 84    QT Interval 338    QTC Interval 442    P Axis 79    QRS Axis 93    T Wave Axis 82    Narrative    Sinus tachycardia  Rightward axis  Borderline ECG  When compared with ECG of 19-SEP-2022 08:31,  No significant change was found  Confirmed by Cece Newsome ([id number]) on 9/19/2022 11:40:16 AM        *-*-*-*-*-*-*-*-*-*-*-*-*-*-*-*-*-*-*-*-*-*-*-*-*-*-*-*-*-*-*-*-*-*-*-*-*-*-*-*-*-*-*-*-*-*-*-*-*-*-*-*-*-*  LABORATORY DATA:  I have personally reviewed pertinent labs  CMP:  Results from last 7 days   Lab Units 10/06/22  0622 10/05/22  2508 10/05/22  0449 10/04/22  0444 10/02/22  0505 10/01/22  0507 10/01/22  0145   SODIUM mmol/L 140 144 138 146   < > 144 146   POTASSIUM mmol/L 3 9 3 2* 3 3* 2 9*   < > 3 2* 3 1*   CHLORIDE mmol/L 104 105 107 102   < > 102 104   CO2 mmol/L 29 32 26 37*   < > 33* 34*   BUN mg/dL 24 30* 8 34*   < > 39* 41*   CREATININE mg/dL 2 03* 2 55* 0 79 3 07*   < > 5 78* 5 88*   CALCIUM mg/dL 10 9* 9 9 9 0 9 7   < > 9 0 9 3   ALK PHOS U/L  --   --   --  78  --  76 91   ALT U/L  --   --   --  18  --  47 45   AST U/L  --   --   --  30  --  56* 66*    < > = values in this interval not displayed          Results from last 7 days   Lab Units 10/03/22  0507 10/02/22  0505 10/01/22  0507   MAGNESIUM mg/dL 1 7 1 9 2 1 Results from last 7 days   Lab Units 10/03/22  0507 10/02/22  0505 10/01/22  0507   PHOSPHORUS mg/dL 2 6* 2 6* 5 3*    Cardiac Profile:   Results from last 7 days   Lab Units 10/01/22  0145 09/30/22  0137   NT-PRO BNP pg/mL  --  44,580*   LACTIC ACID mmol/L 1 3  --                     Arterial Blood Gas Analysis:   Results from last 7 days   Lab Units 09/29/22  2302   PH ART  7 364   PCO2 ART mm Hg 42 7   PO2 ART mm Hg 86 0   HCO3 ART mmol/L 23 8   BASE EXC ART mmol/L -1 6   O2 CONTENT ART mL/dL 14 6*   O2 HGB, ARTERIAL % 95 2   ABG SOURCE  Radial, Right    Venous Blood Gas Analysis:   Results from last 7 days   Lab Units 10/01/22  0145   PH CHARLES  7 398   PO2 CHARLES mm Hg 50 3*   HCO3 CHARLES mmol/L 28 6   BASE EXC CHARLES mmol/L 3 1   O2 CONTENT CHARLES ml/dL 14 3   O2 HGB, VENOUS % 85 2*        CBC:   Results from last 7 days   Lab Units 10/06/22  0622 10/05/22  0449 10/04/22  0444   WBC Thousand/uL 14 36* 11 04* 18 05*   HEMOGLOBIN g/dL 10 4* 9 9* 10 8*   HEMATOCRIT % 33 4* 29 7* 34 5*   PLATELETS Thousands/uL 318 326 320     PT/INR: No results found for: PT, INR, Microbiology:   Results from last 7 days   Lab Units 10/03/22  1823   C DIFF TOXIN B BY PCR  Negative        *-*-*-*-*-*-*-*-*-*-*-*-*-*-*-*-*-*-*-*-*-*-*-*-*-*-*-*-*-*-*-*-*-*-*-*-*-*-*-*-*-*-*-*-*-*-*-*-*-*-*-*-*-*-  IMAGING STUDIES REPORTS: Imaging studies results reviewed    No valid procedures specified  No Chest XR results available for this patient  *-*-*-*-*-*-*-*-*-*-*-*-*-*-*-*-*-*-*-*-*-*-*-*-*-*-*-*-*-*-*-*-*-*-*-*-*-*-*-*-*-*-*-*-*-*-*-*-*-*-*-*-*-*-  AVAILABLE OLD CARDIAC TESTS REPORTS:   No results found for this or any previous visit  No results found for this or any previous visit  No results found for this or any previous visit  No results found for this or any previous visit         *-*-*-*-*-*-*-*-*-*-*-*-*-*-*-*-*-*-*-*-*-*-*-*-*-*-*-*-*-*-*-*-*-*-*-*-*-*-*-*-*-*-*-*-*-*-*-*-*-*-*-*-*-*-  SIGNATURES:   @DEP@   [facility]

## 2022-10-06 NOTE — ASSESSMENT & PLAN NOTE
· Likely source of rhabdomyolysis  No evidence of compartment syndrome fracture or DVT on imaging  · 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    · Ortho had previously evaluated  · Will obtain MRI of left lower extremity

## 2022-10-06 NOTE — ASSESSMENT & PLAN NOTE
· Acute encephalopathy secondary to gabapentin and morphine with subsequent renal failure, potentially component of psychiatric illness given history and bacteremia  · Consider hospital delirium due to prolong stay  · Received course of thiamine concern for Wernicke's encephalopathy  · LP and MRI negative for acute pathology, neurology evaluated  · Discussed with neurology, patient likely will have slow improvement if any over the coming weeks  · Continue dysphagia diet  · Speech therapy to continue follow-up  · More alert today, answering some questions

## 2022-10-06 NOTE — PROGRESS NOTES
[address] [facility]  Progress Note - Daniele Braun 1970, 45 y o  female MRN: [id number]  Unit/Bed#: [place] 2 -01 Encounter: [id number]  Primary Care Provider: Ozzie Emanuel MD   Date and time admitted to hospital: 9/19/2022  8:04 AM    * Toxic metabolic encephalopathy  Assessment & Plan  · Acute encephalopathy secondary to gabapentin and morphine with subsequent renal failure, potentially component of psychiatric illness given history and bacteremia  · Consider hospital delirium due to prolong stay  · Received course of thiamine concern for Wernicke's encephalopathy  · LP and MRI negative for acute pathology, neurology evaluated  · Discussed with neurology, patient likely will have slow improvement if any over the coming weeks  · Continue dysphagia diet  · Speech therapy to continue follow-up  · More alert today, answering some questions      Diarrhea  Assessment & Plan  Reports having multiple loose bowel movements  C diff negative, no prior history  Continue florastor, immodium prn    Bacteremia due to methicillin susceptible Staphylococcus aureus (MSSA)  Assessment & Plan  MSSA bacteremia, repeat cultures neg for greater than 72 hours  Echo with no vegetation  ID recommend CHERYLE  Continue Ancef, continue po vanc for prophylaxis  Cards to evaluate, potential CHERYLE tomorrow      Leg edema, right  Assessment & Plan  · Likely source of rhabdomyolysis  No evidence of compartment syndrome fracture or DVT on imaging  · 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    · Ortho had previously evaluated  · Will obtain MRI of left lower extremity      Traumatic rhabdomyolysis [facility])  Assessment & Plan  · Secondary to fall, patient had been laying in bed for approximately 4 days  · She was subsequently brought to the hospital and found to be in acute rhabdomyolysis with ANUJA  · Had been on multiple sedating medications including MS Contin as well as gabapentin, which likely were contributing to sedation  · Now resolved    ARF (acute renal failure) ([facility] [address] )  Assessment & Plan  · ANUJA/ATN secondary to rhabdomyolysis  No evidence of renal obstruction on imaging  · Nephrology following  · No emergent need for hemodialysis  · Renal function improving, nephrology signed off    Results from last 7 days   Lab Units 10/06/22  0622 10/05/22  6872 10/05/22  0449 10/04/22  0444 10/03/22  0913 10/02/22  0505 10/01/22  0507 10/01/22  0145 09/30/22  0137   BUN mg/dL 24 30* 8 34* 34* 36* 39* 41* 40*   CREATININE mg/dL 2 03* 2 55* 0 79 3 07* 3 73* 4 79* 5 78* 5 88* 6 37*   EGFR ml/min/1 73sq m 27 20 85 16 13 9 7 7 6       DDD (degenerative disc disease), lumbosacral  Assessment & Plan  · Lumbar radiculopathy with previously scheduled surgery now on hold  · Prior to admission was on gabapentin and morphine IR 15 mg  · Gabapentin resumed at lower dose, hold morphine due to concern for over sedation      Tobacco abuse  Assessment & Plan  · Nicotine patch ordered    Depression  Assessment & Plan  · Does have history of suicide attempt a currently does not show any signs of thoughts of self-harm  · Psychiatry consultation appreciated  · Continue Seroquel 50 mg twice a day   · Zyprexa 5 mg by mouth every 6 hours PRN agitation        VTE Pharmacologic Prophylaxis:   Pharmacologic: Heparin  Mechanical VTE Prophylaxis in Place: Yes    Patient Centered Rounds: I have performed bedside rounds with nursing staff today  Discussions with Specialists or Other Care Team Provider: NephRISHABH auguste, Cards    Education and Discussions with Family / Patient: patient    Time Spent for Care: 30 minutes  More than 50% of total time spent on counseling and coordination of care as described above      Current Length of Stay: 17 day(s)    Current Patient Status: Inpatient   Certification Statement: The patient will continue to require additional inpatient hospital stay due to pending CHERYLE, IV abx, improvement in mental status    Discharge Plan: pending    Code Status: Level 1 - Full Code    Subjective:   No events overnight, tolerating diet by mouth  Appears slightly more awake and alert, answering questions with yes  She is alert and oriented only to self, not time or place  Objective:     Vitals:   Temp (24hrs), Av 1 °F (36 7 °C), Min:97 8 °F (36 6 °C), Max:98 6 °F (37 °C)    Temp:  [97 8 °F (36 6 °C)-98 6 °F (37 °C)] 98 6 °F (37 °C)  HR:  [101-132] 101  Resp:  [17-20] 17  BP: (112-153)/(70-95) 153/95  SpO2:  [96 %-98 %] 98 %  Body mass index is 18 47 kg/m²  Input and Output Summary (last 24 hours): Intake/Output Summary (Last 24 hours) at 10/6/2022 1220  Last data filed at 10/5/2022 2218  Gross per 24 hour   Intake 1320 ml   Output --   Net 1320 ml       Physical Exam:     Physical Exam  Vitals and nursing note reviewed  Constitutional:       General: She is not in acute distress  Appearance: She is not ill-appearing  HENT:      Head: Normocephalic and atraumatic  Eyes:      General: No scleral icterus  Conjunctiva/sclera: Conjunctivae normal    Cardiovascular:      Rate and Rhythm: Normal rate  Pulses: Normal pulses  Pulmonary:      Effort: Pulmonary effort is normal  No respiratory distress  Abdominal:      General: Bowel sounds are normal  There is no distension  Palpations: Abdomen is soft  Musculoskeletal:         General: No swelling  Right lower leg: No edema  Left lower leg: No edema  Skin:     General: Skin is warm and dry  Neurological:      Mental Status: She is disoriented  Psychiatric:         Behavior: Behavior is slowed  Behavior is cooperative  Cognition and Memory: Cognition is impaired  Judgment: Judgment is impulsive         Additional Data:     Labs:    Results from last 7 days   Lab Units 10/06/22  0622   WBC Thousand/uL 14 36*   HEMOGLOBIN g/dL 10 4*   HEMATOCRIT % 33 4*   PLATELETS Thousands/uL 318   NEUTROS PCT % 69   LYMPHS PCT % 22   MONOS PCT % 5 EOS PCT % 2     Results from last 7 days   Lab Units 10/06/22  0622 10/05/22  0449 10/04/22  0444   SODIUM mmol/L 140   < > 146   POTASSIUM mmol/L 3 9   < > 2 9*   CHLORIDE mmol/L 104   < > 102   CO2 mmol/L 29   < > 37*   BUN mg/dL 24   < > 34*   CREATININE mg/dL 2 03*   < > 3 07*   ANION GAP mmol/L 7   < > 7   CALCIUM mg/dL 10 9*   < > 9 7   ALBUMIN g/dL  --   --  2 5*   TOTAL BILIRUBIN mg/dL  --   --  0 36   ALK PHOS U/L  --   --  78   ALT U/L  --   --  18   AST U/L  --   --  30   GLUCOSE RANDOM mg/dL 153*   < > 137    < > = values in this interval not displayed  Results from last 7 days   Lab Units 09/30/22  0024   POC GLUCOSE mg/dl 126         Results from last 7 days   Lab Units 10/04/22  0444 10/01/22  0145   LACTIC ACID mmol/L  --  1 3   PROCALCITONIN ng/ml 0 26*  --            * I Have Reviewed All Lab Data Listed Above  * Additional Pertinent Lab Tests Reviewed: Matilde 66 Admission Reviewed    Imaging:    CT chest abdomen pelvis wo contrast    Result Date: 9/19/2022  Impression: Bilateral groundglass opacities with superimposed inter and intralobular septal thickening  Differential diagnosis includes pulmonary hemorrhage, infection, and pulmonary edema, although the distribution is atypical for edema  The study was marked in [facility] for immediate notification  Workstation performed: [id number]     XR hip/pelv 2-3 vws right    Result Date: 9/19/2022  Impression: No acute osseous abnormality  Workstation performed: [id number]     XR ankle 3+ views RIGHT    Result Date: 9/19/2022  Impression: No acute osseous abnormality  Workstation performed: [id number]     XR foot 3+ views RIGHT    Result Date: 9/19/2022  Impression: No acute osseous abnormality  Workstation performed: [id number]     CT head without contrast    Result Date: 9/19/2022  Impression: No acute intracranial process  No skull fracture   Workstation performed: [id number]     CT spine cervical without contrast    Result Date: 9/19/2022  Impression: No cervical spine fracture or traumatic malalignment  Incidental finding of partially visualized subpleural groundglass opacity in the left pulmonary apex presumably scarring  Cannot exclude infiltrate  This study demonstrates a significant  finding and was documented as such in [facility] for liaison and referring practitioner notification  Workstation performed: [id number]     MRI brain wo contrast    Result Date: 9/23/2022  Impression: No evidence of recent infarct  No mass effect or midline shift  Study performed in the limited fashion with extensive motion artifact  Workstation performed: [id number]     XR chest 1 view    Result Date: 9/19/2022  Impression: No acute cardiopulmonary disease  Findings are stable Workstation performed: [id number]     US right upper quadrant with liver dopplers    Result Date: 9/20/2022  Impression: 1  Minimal layering sludge without evidence of acute cholecystitis  2   Normal-appearing liver with normal liver Dopplers  Workstation performed: [id number]     CT lower extremity wo contrast right    Result Date: 9/19/2022  Impression: There is no evidence of intramuscular hematoma or other mass lesion in the right calf  Please note that rhabdomyolysis and compartment syndrome are clinical diagnoses, and are not excluded based on these imaging findings   Workstation performed: [id number]       Recent Cultures (last 7 days):     Results from last 7 days   Lab Units 10/03/22  1823   C DIFF TOXIN B BY PCR  Negative       Last 24 Hours Medication List:   Current Facility-Administered Medications   Medication Dose Route Frequency Provider Last Rate   • acetaminophen  650 mg Oral Q6H PRN Yaquelin Wells DO     • ALPRAZolam  0 5 mg Oral HS PRN Cachorro Roberto MD     • amLODIPine  10 mg Oral Daily Joan Bass MD     • cefazolin  2,000 mg Intravenous Q12H Franklyn Jaramillo MD     • gabapentin  100 mg Oral TID Cachorro Roberto MD     • heparin (porcine) 5,000 Units Subcutaneous [facility] Atul Adan, DO     • hydrALAZINE  5 mg Intravenous Q6H PRN Karla Person, DO     • HYDROmorphone  0 5 mg Intravenous Q4H PRN Luís Garrido, DO     • levalbuterol  1 25 mg Nebulization Q4H PRN Karla Person, DO     • loperamide  2 mg Oral 4x Daily PRN Carmela Stoner MD     • metoprolol tartrate  25 mg Oral Q12H [facility] Inder Jaquez MD     • VANDANA ANTIFUNGAL   Topical BID Karla Person, DO     • nicotine  1 patch Transdermal Daily Atul Adan, DO     • OLANZapine  5 mg Oral Q6H PRN Karla Person DO     • ondansetron  4 mg Intravenous Q4H PRN Luís Garrido, DO     • QUEtiapine  50 mg Oral BID Carmela Stoner MD     • saccharomyces boulardii  250 mg Oral BID Carmela Stoner MD          Today, Patient Was Seen By: Carmela Stoner    ** Please Note: Dictation voice to text software may have been used in the creation of this document   **

## 2022-10-06 NOTE — ASSESSMENT & PLAN NOTE
Reports having multiple loose bowel movements  C diff negative, no prior history  Continue florastor, immodium prn

## 2022-10-06 NOTE — PROGRESS NOTES
Progress Note - Infectious Disease   Yanet Mascorro 45 y o  female MRN: [id number]  Unit/Bed#: [place] 2 -01 Encounter: [id number]      Impression/Plan:  1  SIRS vs Sepsis, not present on admission, a/e/b fever, tachycardia  Mild leukocytosis   Suspect this is likely due to Gram-positive bacteremia   No recurrent high fever spike  No lactic acidosis   COVID-19 negative   UA benign   Status post lumbar puncture for altered mental status with negative ME panel   Clinically much improved   -antibiotics as below   -follow-up cultures and adjust antibiotics as needed  -monitor temperature and hemodynamics  -recheck CBC and BMP in a m      2  MSSA bacteremia  2 of 2 admission blood cultures postive for MSSA  Patient was found down for prolonged period of time with evidence of multiple wounds on admission  Suspect cutaneous vs endovascular source, likely phlebitis as there is report of RUE erythema surrounding prior IV in addition to palpable cord in left antecub  TTE negative for obvious vegetation  Repeat blood cultures x 2 sets are negative at 24h   CHERYLE planned today now on hold due to tenuous respiratory status  Repeat transthoracic echocardiogram limited but negative for valvular vegetation  -decrease the cefazolin to 2 g IV q 12 hours has a creatinine clearance remains below 35  -recommend CHERYLE if able to safely do  -plan 2-4 week course of IV antibiotics depending on whether CHERYLE is able to safely be done and the results     3  Toxic Metabolic encephalopathy   Likely multifactorial etiology with uremia playing a role   This was initially thought to be secondary to gabapentin and morphine use in the setting of renal failure, hypotension, rhabdomyolysis   MRI brain negative   Lumbar puncture with opening pressure 26 with negative ME panel  CSF fluid culture not collected   The most likely to be secondary to metabolic issues, medication effect, and possible Wernicke's encephalopathy   Psychiatric issues also likely playing a role  -monitor cognition  -treat metabolic issues and psychiatric issues  -no additional ID workup needed     4  Acute renal failure   Creatinine on admission 5 88 with CK over 32,000  Creatinine reached plateau, now down EJUEROLY 3 8 today   Likely multifactorial etiology in the setting of rhabdomyolysis and dehydration  Likely ANUJA/ATN   The renal function is slowly improving  Original lab from yesterday was probably an error  -renally dose antibiotics as above  -close Nephrology follow-up  -recheck BMP     5  Tobacco abuse   Encourage cessation as recommended by primary team      6  Antibiotic allergy  Hx of hives with PCNs  Tolerated ceftriaxone and now tolerating cefazolin  Monitor for MUSA      7  Acute hypoxic respiratory failure- CT scan suggest new areas of ground-glass opacification of unclear significance   Possible aspiration pneumonitis versus pneumonia   Procalcitonin level is relatively low  No longer requiring oxygen support  Chest x-ray now clear  -monitor respiratory status     8  Diarrhea-possibly all antibiotic related  Stool for C diff is negative  Started on probiotic   -discontinue oral vancomycin  -monitor stool output    Discussed the above management plan with the primary service    Antibiotics:  Cefazolin 10    Subjective:  Patient has no fever, chills, sweats; no nausea, vomiting, diarrhea; no cough, shortness of breath; no pain  No new symptoms  She remains confused    Objective:  Vitals:  Temp:  [97 8 °F (36 6 °C)-98 6 °F (37 °C)] 98 6 °F (37 °C)  HR:  [101-132] 101  Resp:  [17-20] 17  BP: (112-153)/(70-95) 153/95  SpO2:  [96 %-98 %] 98 %  Temp (24hrs), Av 1 °F (36 7 °C), Min:97 8 °F (36 6 °C), Max:98 6 °F (37 °C)  Current: Temperature: 98 6 °F (37 °C)    Physical Exam:   General Appearance:  Alert, confused, interactive, nontoxic, no acute distress  Choreaform movements   Throat: Oropharynx moist without lesions      Lungs:   Clear to auscultation bilaterally; no wheezes, rhonchi or rales; respirations unlabored   Heart:  RRR; no murmur, rub or gallop   Abdomen:   Soft, non-tender, non-distended, positive bowel sounds  Extremities: No clubbing, cyanosis or edema   Skin: No new rashes or lesions  No draining wounds noted  Labs, Imaging, & Other studies:   All pertinent labs and imaging studies were personally reviewed  Results from last 7 days   Lab Units 10/06/22  0622 10/05/22  0449 10/04/22  0444   WBC Thousand/uL 14 36* 11 04* 18 05*   HEMOGLOBIN g/dL 10 4* 9 9* 10 8*   PLATELETS Thousands/uL 318 326 320     Results from last 7 days   Lab Units 10/06/22  0622 10/05/22  1361 10/05/22  0449 10/04/22  0444 10/02/22  0505 10/01/22  0507 10/01/22  0145   SODIUM mmol/L 140 144 138 146   < > 144 146   POTASSIUM mmol/L 3 9 3 2* 3 3* 2 9*   < > 3 2* 3 1*   CHLORIDE mmol/L 104 105 107 102   < > 102 104   CO2 mmol/L 29 32 26 37*   < > 33* 34*   BUN mg/dL 24 30* 8 34*   < > 39* 41*   CREATININE mg/dL 2 03* 2 55* 0 79 3 07*   < > 5 78* 5 88*   EGFR ml/min/1 73sq m 27 20 85 16   < > 7 7   CALCIUM mg/dL 10 9* 9 9 9 0 9 7   < > 9 0 9 3   AST U/L  --   --   --  30  --  56* 66*   ALT U/L  --   --   --  18  --  47 45   ALK PHOS U/L  --   --   --  78  --  76 91    < > = values in this interval not displayed       Results from last 7 days   Lab Units 10/03/22  1823   C DIFF TOXIN B BY PCR  Negative     Results from last 7 days   Lab Units 10/04/22  0444   PROCALCITONIN ng/ml 0 26*        chest x-ray-clear lung fields    Images personally reviewed by me in PACS

## 2022-10-06 NOTE — PLAN OF CARE
Problem: PHYSICAL THERAPY ADULT  Goal: Performs mobility at highest level of function for planned discharge setting  See evaluation for individualized goals  Description: Treatment/Interventions: Functional transfer training, LE strengthening/ROM, Elevations, Therapeutic exercise, Cognitive reorientation, Patient/family training, Equipment eval/education, Bed mobility, Gait training, Compensatory technique education, Continued evaluation, Spoke to nursing, Spoke to case management, Spoke to MD, OT, ST  Equipment Recommended: Yury Burns (if patient does not own)       See flowsheet documentation for full assessment, interventions and recommendations  Outcome: Progressing  Note: Prognosis: Guarded  Problem List: Decreased strength, Decreased range of motion, Decreased endurance, Impaired balance, Decreased mobility, Decreased coordination, Decreased cognition, Impaired judgement, Pain  Assessment: Pt  noted with slightly more improvement with cognition and able to respond appropriately  However noted patient responded only with an yes  Pt  specifcally reported she has pain in her ankle consistently when asked but did not repsond when asked if she has pain in her hip or knee  Noted patient unable to WB through her RLE when standing and for ambulation  Antalgic ambulation noted  pt  needed assist in negotiating RW during ambulation  pt  reported with an yes when asked if she felt safe with the therapists and the environment she is in multiple times and patient reported with a yes  Pt  needed tactile cues along with VCs to initiate tasks and followed instructions when given  Pt  cooperative with all mobility  Pt  requires A x 2 mainly due to poor cognition  pt  needed total A for pericare and patient noted to have loose BMs often twice during the session  pt  presents with deficits in her mobility, cognition, balance, and coordination and is a high of fall  pain in her RLE is also a limiting factor to her mobility  Recommend STR vs LTC pending progress  Reported to LOVE Crowell and to CHRISTIANO singh about patient's status and concerns about patient  Barriers to Discharge: Inaccessible home environment, Decreased caregiver support  Barriers to Discharge Comments: unable to identify at this time  PT Discharge Recommendation: Post acute rehabilitation services (may transition to LTC pending progress)    See flowsheet documentation for full assessment

## 2022-10-06 NOTE — CASE MANAGEMENT
Case Management Discharge Planning Note    Patient name Jordan Case  [facility]N [id number]  : 1970 Date 10/6/2022       Current Admission Date: 2022  Current Admission Diagnosis:Toxic metabolic encephalopathy   Patient Active Problem List    Diagnosis Date Noted   • Diarrhea 10/03/2022   • Hypokalemia 10/02/2022   • Hypernatremia 10/02/2022   • Aspiration pneumonitis ([address] ) 10/01/2022   • Pulmonary edema 2022   • Bacteremia due to methicillin susceptible Staphylococcus aureus (MSSA) 2022   • Localized swelling of right upper extremity 2022   • Leg edema, right 2022   • D-dimer, elevated 2022   • ARF (acute renal failure) ([address] ) 2022   • Transaminitis 2022   • Toxic metabolic encephalopathy    • Abnormal CT of the chest 2022   • Traumatic rhabdomyolysis ([address] ) 2022   • Hyperglycemia 2022   • Opioid dependence ([address] ) 2021   • Chronic right shoulder pain 2021   • Internal hemorrhoids 2020   • Adnexal cyst 2020   • Ischemic colitis ([address] ) 2020   • Intercostal neuralgia    • Hematochezia 2019   • Insomnia 2019   • History of rib fracture 10/28/2018   • Tobacco abuse 10/28/2018   • Right knee pain 2018   • Generalized anxiety disorder 2018   • Hypertension    • Hyperlipidemia    • Depression    • COPD (chronic obstructive pulmonary disease) ([address] )    • Chondromalacia patellae 2018   • Irritable bowel syndrome with diarrhea 2018   • DDD (degenerative disc disease), lumbosacral 2014      LOS (days): 17  Geometric Mean LOS (GMLOS) (days):   Days to GMLOS:     OBJECTIVE:  Risk of Unplanned Readmission Score: 26 72         Current admission status: Inpatient   Preferred Pharmacy:   [address] [place] [address]   [address] [place] [address]  Phone: [phone] Fax: [phone] - [place]ETHANY MAHONEY - [address] [facility]  [address] [facility]  [address] [id number]  Phone: [phone] Fax: [phone]    Primary Care Provider: Devika Bowser MD    Primary Insurance: BLUE CROSS  Secondary Insurance:     DISCHARGE DETAILS:                                                                                                 Additional Comments: Per attending, pt's mentation improving with therapies concerned re: inability to bear weight on RLE- attending made aware of this with MRI pending  Updated following SNF's with current anticipation for clearance early next week  Will continue to follow to assist with dc poc

## 2022-10-06 NOTE — PLAN OF CARE
Problem: OCCUPATIONAL THERAPY ADULT  Goal: Performs self-care activities at highest level of function for planned discharge setting  See evaluation for individualized goals  Description: Treatment Interventions: ADL retraining, Functional transfer training, UE strengthening/ROM, Endurance training, Cognitive reorientation, Patient/family training, Equipment evaluation/education, Neuromuscular reeducation, Compensatory technique education, Continued evaluation, Energy conservation, Activityengagement          See flowsheet documentation for full assessment, interventions and recommendations  Outcome: Progressing  Note: Limitation: Decreased ADL status, Decreased Safe judgement during ADL, Decreased cognition, Decreased endurance, Decreased high-level ADLs     Assessment: Patient participated in skilled OT session this date with interventions consisting of therapeutic activities, neuro-reeducation, and self-care/ADL training to increase B UE strength, functional endurance/activity tolerance, static/dynamic sitting/standing balance, and overall safety awareness to increase (I) with ADLs/IADLs to return to PLOF  Provided education on energy conservation techniques, deep breathing techniques, fall prevention strategies, and overall safety awareness with little learning capacity noted a this time  Pt presented with B restraints on B wrists, seated EOB between bed rails; had been incontinent of bowel  Please refer to flowsheet for functional performance  Pt demonstrating impaired ability to WB through R LE  When asked if pt was in pain, she audibly states "Yes" consistently  Applied pressure, in sitting, to R hip, R knee, and R ankle, with pt stating "yes" to R ankle re: pain, and no reply for other joints questioned  Pt able to answer some yes/no questions, with no response seems to be indicating a "no"  Brought up spouse, in which patient's facial expression change to a grimace and looking away   Attempted to ask f/u questions, with pt continuously grimacing almost  Asked pt if she felt safe at the hospital and with therapists, in which there was a strong verbal "yes" response  Asked pt if she felt safe at home, with pt making the same grimace as prior  Asked the same set of questions again with the exact same responses provided by patient  Did make RN and CM Jennine Skiff aware of situation  Patient requiring verbal cues for safety and verbal cues for pacing through activity steps  Patient continues to be functioning below baseline level, occupational performance remains limited secondary to factors listed above and increased risk for falls and injury  The patient's raw score on the AM-PAC Daily Activity inpatient short form is 8, standardized score is 29 04, less than 39 4  Patients at this level are likely to benefit from discharge to post-acute rehabilitation services  Please refer to the recommendation of the Occupational Therapist for safe discharge planning       OT Discharge Recommendation: Post acute rehabilitation services

## 2022-10-06 NOTE — ASSESSMENT & PLAN NOTE
MSSA bacteremia, repeat cultures neg for greater than 72 hours  Echo with no vegetation  ID recommend CHERYLE  Continue Ancef, continue po vanc for prophylaxis  Cards to evaluate, potential CHERYLE tomorrow

## 2022-10-06 NOTE — PROGRESS NOTES
Follow up Consultation    Nephrology   Glorious Roles 45 y o  female MRN: [id number]  Unit/Bed#: [address] 87 217-01 Encounter: [id number]      Physician Requesting Consult: Damian Gordillo MD        ASSESSMENT/PLAN:  62 year female multiple comorbidities including GERD, depression, substance abuse admitted with altered mental status  Nephrology following for acute kidney injury  Acute kidney injury (POA):  ANUJA multifactorial most likely secondary to severe rhabdomyolysis plus some component prerenal azotemia plus likely some component of infectious GN due to MSSA bacteremia  After review of records [facility] as well as Care everywhere it appears that the patient has a baseline Creatinine of 0 8-1 1 mg/dL  patient was admitted with a creatinine of 5 88 mg/dL on 09/19/2022  Peak creatinine this hospitalization at 7 67 mg/dL on 09/25/2022  patient's creatinine today is at 2 03 mg/dL, continues to improve still  No acute indication for dialysis at this time  Dialysis consent was obtained previously per by my colleague and is in the chart if needed  Overall patient does not appear to be good candidate for dialysis since she has been pulling out tubes  I am not sure if she will be able to even hold still for dialysis and allow for the dialysis catheter to stay in  In light of improving renal function will hold off on dialysis for now  check BMP in a m if still in the hospital   Await renal recovery  Optimize hemodynamic status to avoid delay in renal recovery  Place on a renal diet when allowed diet order  Avoid nephrotoxins, adjust meds to appropriate GFR  Strict I/O  Daily weights  Urinary retention protocol if patient does not have a Rivera  will need to set up patient for follow up with Nephrology as an outpatient post hospitalization  If patient is to be discharged will need blood work post hospital discharge    Blood pressure/hypertension:  current medications:  Norvasc 10 mg p o   Q day, metoprolol 25 mg p o  Q 12  recommendations:  Last dose of Bumex 2 mg on 10/02/2022 if patient with worsening shortness of breath may give additional dosage  Avoid diuretics for now  Optimize hemodynamics  Maintain MAP > 65mmHg  Avoid BP fluctuations  H/H/anemia:  most recent hemoglobin at 10 4 grams/deciliter  maintain hemoglobin greater than 8 grams/deciliter    Acid-base electrolytes:    Electrolytes:    Stable  Hypernatremia:   Most recent sodium at 140 mEq  Encourage free water intake for now  Resolved    Hypokalemia:  Most recent potassium at 3 9 mEq  Resolved    Hyperphosphatemia:  Most recent phosphorus down to 2 6 improving    Acid-base:    Most recent bicarb at 29, alkalosis improving    Other medical problems:  Proteinuria: Most recent UA from 2022 with trace protein  MSSA bacteremia:  Management per primary team   On Anc  CT chest concerning for multifocal opacities concerning for multifocal pneumonia new small moderate bilateral pleural effusions  Altered mental status/encephalopathy:  Appears to be multifactorial secondary to underlying psych illness plus gabapentin plus morphine usage, in light of continued improvement in renal function doubt renal dysfunction is playing a role  Overall improving  Management per primary team      Thanks for the consult  Will sign off  Please call with questions/ concerns  Above-mentioned orders and Plan in terms of acute kidney injury was discussed with the team in depth in-person    Aimee Berry, 10/6/2022, 10:19 AM              Objective :   Patient seen and examined in her room no overnight events hemodynamically stable remains, afebrile pleasantly confused with some diarrhea got some IV fluids yesterday        PHYSICAL EXAM  /95   Pulse 101   Temp 98 6 °F (37 °C)   Resp 17   Ht 5' 6" (1 676 m)   Wt 51 9 kg (114 lb 6 7 oz)   LMP 2019 (Approximate)   SpO2 98%   BMI 18 47 kg/m²   Temp (24hrs), Av 1 °F (36 7 °C), Min:97 8 °F (36 6 °C), Max:98 6 °F (37 °C)        Intake/Output Summary (Last 24 hours) at 10/6/2022 1019  Last data filed at 10/5/2022 2218  Gross per 24 hour   Intake 1320 ml   Output --   Net 1320 ml       I/O last 24 hours: In: 1560 [P O :600; I V :960]  Out: -       Current Weight: Weight - Scale: 51 9 kg (114 lb 6 7 oz)  First Weight: Weight - Scale: 62 6 kg (138 lb 0 1 oz)  Physical Exam  Vitals and nursing note reviewed  Constitutional:       General: She is not in acute distress  Appearance: Normal appearance  She is normal weight  She is ill-appearing  She is not toxic-appearing or diaphoretic  HENT:      Head: Normocephalic and atraumatic  Mouth/Throat:      Mouth: Mucous membranes are moist       Pharynx: Oropharynx is clear  No oropharyngeal exudate  Eyes:      General: No scleral icterus  Conjunctiva/sclera: Conjunctivae normal    Cardiovascular:      Rate and Rhythm: Normal rate  Heart sounds: Normal heart sounds  No friction rub  Pulmonary:      Effort: Pulmonary effort is normal  No respiratory distress  Breath sounds: Normal breath sounds  No stridor  Abdominal:      General: There is no distension  Tenderness: There is no abdominal tenderness  Musculoskeletal:         General: No swelling  Cervical back: Normal range of motion and neck supple  No rigidity  Skin:     General: Skin is warm  Coloration: Skin is not jaundiced  Neurological:      General: No focal deficit present  Mental Status: She is alert  Mental status is at baseline  She is disoriented  Psychiatric:         Behavior: Behavior normal              Review of Systems   Unable to perform ROS: Mental status change   Constitutional: Negative for fatigue and fever  Respiratory: Negative for cough  Cardiovascular: Negative for leg swelling  Gastrointestinal: Positive for diarrhea  Genitourinary: Negative for dysuria  Neurological: Negative for headaches     Psychiatric/Behavioral: Positive for confusion  Negative for agitation  Scheduled Meds:  Current Facility-Administered Medications   Medication Dose Route Frequency Provider Last Rate   • acetaminophen  650 mg Oral Q6H PRN Tylor Uribe, DO     • ALPRAZolam  0 5 mg Oral HS PRN Adrianne Underwood MD     • amLODIPine  10 mg Oral Daily Jose Haile MD     • cefazolin  2,000 mg Intravenous Q12H Jarvis Matos MD     • gabapentin  100 mg Oral TID Adrianne Underwood MD     • heparin (porcine)  5,000 Units Subcutaneous [facility] Tayler Adan, DO     • hydrALAZINE  5 mg Intravenous Q6H PRN Ned Owens DO     • HYDROmorphone  0 5 mg Intravenous Q4H PRN Tylor Uribe, DO     • levalbuterol  1 25 mg Nebulization Q4H PRN Ned Owens, DO     • loperamide  2 mg Oral 4x Daily PRN Adrianne Underwood MD     • metoprolol tartrate  25 mg Oral Q12H [facility] Inder Callaway MD     • VANDANA ANTIFUNGAL   Topical BID Ned Owens DO     • nicotine  1 patch Transdermal Daily Atul Adna DO     • OLANZapine  5 mg Oral Q6H PRN Ned Owens DO     • ondansetron  4 mg Intravenous Q4H PRN Tylor Uribe, DO     • QUEtiapine  50 mg Oral BID Adrianne Unedrwood MD     • saccharomyces boulardii  250 mg Oral BID Adrianne Underwood MD         PRN Meds: •  acetaminophen  •  ALPRAZolam  •  hydrALAZINE  •  HYDROmorphone  •  levalbuterol  •  loperamide  •  OLANZapine  •  ondansetron    Continuous Infusions:       Invasive Devices:      Invasive Devices  Report    Peripheral Intravenous Line  Duration           Peripheral IV 10/04/22 Proximal;Right;Ventral (anterior) Forearm 2 days          Drain  Duration           NG/OG/Enteral Tube Enteral Feeding Tube Right nare 5 days                  LABORATORY:    Results from last 7 days   Lab Units 10/06/22  0622 10/05/22  0938 10/05/22  0449 10/04/22  0444 10/03/22  0913 10/03/22  0507 10/02/22  0505 10/01/22  0507 10/01/22  0145   WBC Thousand/uL 14 36*  --  11 04* 18 05*  --  20 32* 15 05* 18 54* 19 44*   HEMOGLOBIN g/dL 10 4*  --  9 9* 10 8*  --  12 2 12 2 9 7* 11 0*   HEMATOCRIT % 33 4*  --  29 7* 34 5*  --  38 8 38 4 31 2* 34 4*   PLATELETS Thousands/uL 318  --  326 320  --  341 339 325 336   POTASSIUM mmol/L 3 9 3 2* 3 3* 2 9* 3 6  --  3 1* 3 2* 3 1*   CHLORIDE mmol/L 104 105 107 102 100  --  102 102 104   CO2 mmol/L 29 32 26 37* 35*  --  38* 33* 34*   BUN mg/dL 24 30* 8 34* 34*  --  36* 39* 41*   CREATININE mg/dL 2 03* 2 55* 0 79 3 07* 3 73*  --  4 79* 5 78* 5 88*   CALCIUM mg/dL 10 9* 9 9 9 0 9 7 9 7  --  9 8 9 0 9 3   MAGNESIUM mg/dL  --   --   --   --   --  1 7 1 9 2 1 1 7   PHOSPHORUS mg/dL  --   --   --   --   --  2 6* 2 6* 5 3* 5 4*      rest all reviewed    RADIOLOGY:  XR chest portable   Final Result by Haydee Chang MD (10/05 1044)      Resolution of pulmonary edema with no definite effusion but effusions can be obscured on portable chest radiographs  Workstation performed: [id number]         CT chest wo contrast   Final Result by Arjun Moody MD (10/01 8104)      Persistent multifocal pulmonary opacities with new consolidative opacity in right lower lobe, suspicious for combination of multifocal pneumonia and pulmonary edema  New small-to-moderate bilateral pleural effusions (right worse than left), worsened bilateral lower lobe subsegmental atelectasis (right worse than left), and new mild-to-moderate body wall edema  The study was marked in [facility] for immediate notification  Workstation performed: [id number]         XR chest portable   Final Result by Liliya Ruelas MD (10/01 1233)      Persistent extensive patchy bilateral interstitial alveolar consolidation likely slightly increased  Small right pleural effusion  Feeding tube  Workstation performed: [id number]         XR abdomen 1 view kub   Final Result by Kike Goldman MD (10/01 1100)      Feeding tube tip within the proximal stomach                 Workstation performed: [id number] XR abdomen 1 view kub   Final Result by Kemal Cuevas MD (10/01 1100)      Feeding tube tip within the proximal stomach  Workstation performed: [id number]         XR chest portable   Final Result by Ko Ritter MD (09/30 1916)         1  Feeding tube mid to distal esophagus  Patient has a subsequent follow-up abdominal image  2   Bilateral pulmonary opacities consistent with pneumonia or pulmonary edema unchanged since comparison  Workstation performed: [id number]         XR abdomen 1 view kub   Final Result by Ko Ritter MD (09/30 1918)      Advancement of feeding tube into the stomach with no acute findings  Workstation performed: [id number]         XR chest portable   Final Result by Ranye Thurston MD (81/22 1212)      There is new bilateral central airspace opacities, left greater than right, which could be pulmonary edema or pneumonia  Workstation performed: [id number]         VAS lower limb venous duplex study, unilateral/limited   Final Result by Hemalatha Walsh MD (09/25 2224)      MRI brain wo contrast   Final Result by Blanco Batista DO (09/23 1510)      No evidence of recent infarct  No mass effect or midline shift  Study performed in the limited fashion with extensive motion artifact  Workstation performed: [id number]         US right upper quadrant with liver dopplers   Final Result by Kishor Dillard MD (09/20 1326)      1  Minimal layering sludge without evidence of acute cholecystitis  2   Normal-appearing liver with normal liver Dopplers  Workstation performed: [id number]         VAS lower limb venous duplex study, unilateral/limited   Final Result by Hemalatha Walsh MD (09/19 2204)      CT lower extremity wo contrast right   Final Result by Rayne Thurston MD (09/19 1535)      There is no evidence of intramuscular hematoma or other mass lesion in the right calf    Please note that rhabdomyolysis and compartment syndrome are clinical diagnoses, and are not excluded based on these imaging findings  Workstation performed: [id number]         CT chest abdomen pelvis wo contrast   Final Result by Vin Penaloza MD (09/19 1301)      Bilateral groundglass opacities with superimposed inter and intralobular septal thickening  Differential diagnosis includes pulmonary hemorrhage, infection, and pulmonary edema, although the distribution is atypical for edema  The study was marked in [facility] for immediate notification  Workstation performed: [id number]         XR ankle 3+ views RIGHT   Final Result by Mikey Luna MD (09/19 1009)      No acute osseous abnormality  Workstation performed: [id number]         XR foot 3+ views RIGHT   Final Result by Mikey Luna MD (09/19 1009)      No acute osseous abnormality  Workstation performed: [id number]         XR hip/pelv 2-3 vws right   Final Result by Mikey Luna MD (09/19 1007)      No acute osseous abnormality  Workstation performed: [id number]         XR chest 1 view   Final Result by Mikey Luna MD (09/19 1008)      No acute cardiopulmonary disease  Findings are stable            Workstation performed: [id number]         CT head without contrast   Final Result by Chantel Romero MD (09/19 0649)      No acute intracranial process  No skull fracture  Workstation performed: [id number]         CT spine cervical without contrast   Final Result by Chantel Romero MD (09/19 2232)      No cervical spine fracture or traumatic malalignment  Incidental finding of partially visualized subpleural groundglass opacity in the left pulmonary apex presumably scarring  Cannot exclude infiltrate        This study demonstrates a significant  finding and was documented as such in [facility] for liaison and referring practitioner notification  Workstation performed: [id number]           Rest all reviewed    Portions of the record may have been created with voice recognition software  Occasional wrong word or "sound a like" substitutions may have occurred due to the inherent limitations of voice recognition software  Read the chart carefully and recognize, using context, where substitutions have occurred  If you have any questions, please contact the dictating provider

## 2022-10-06 NOTE — PLAN OF CARE
Problem: PAIN - ADULT  Goal: Verbalizes/displays adequate comfort level or baseline comfort level  Description: Interventions:  - Encourage patient to monitor pain and request assistance  - Assess pain using appropriate pain scale  - Administer analgesics based on type and severity of pain and evaluate response  - Implement non-pharmacological measures as appropriate and evaluate response  - Consider cultural and social influences on pain and pain management  - Notify physician/advanced practitioner if interventions unsuccessful or patient reports new pain  Outcome: Progressing     Problem: INFECTION - ADULT  Goal: Absence or prevention of progression during hospitalization  Description: INTERVENTIONS:  - Assess and monitor for signs and symptoms of infection  - Monitor lab/diagnostic results  - Monitor all insertion sites, i e  indwelling lines, tubes, and drains  - Monitor endotracheal if appropriate and nasal secretions for changes in amount and color  - [place] appropriate cooling/warming therapies per order  - Administer medications as ordered  - Instruct and encourage patient and family to use good hand hygiene technique  - Identify and instruct in appropriate isolation precautions for identified infection/condition  Outcome: Progressing     Problem: SAFETY ADULT  Goal: Patient will remain free of falls  Description: INTERVENTIONS:  - Educate patient/family on patient safety including physical limitations  - Instruct patient to call for assistance with activity   - Consult OT/PT to assist with strengthening/mobility   - Keep Call bell within reach  - Keep bed low and locked with side rails adjusted as appropriate  - Keep care items and personal belongings within reach  - Initiate and maintain comfort rounds  - Make Fall Risk Sign visible to staff  - Offer Toileting every 2 Hours, in advance of need  - Initiate/Maintain bed alarm  - Obtain necessary fall risk management equipment: walker  - Apply yellow socks and bracelet for high fall risk patients  - Consider moving patient to room near nurses station  Outcome: Progressing  Goal: Maintain or return to baseline ADL function  Description: INTERVENTIONS:  -  Assess patient's ability to carry out ADLs; assess patient's baseline for ADL function and identify physical deficits which impact ability to perform ADLs (bathing, care of mouth/teeth, toileting, grooming, dressing, etc )  - Assess/evaluate cause of self-care deficits   - Assess range of motion  - Assess patient's mobility; develop plan if impaired  - Assess patient's need for assistive devices and provide as appropriate  - Encourage maximum independence but intervene and supervise when necessary  - Involve family in performance of ADLs  - Assess for home care needs following discharge   - Consider OT consult to assist with ADL evaluation and planning for discharge  - Provide patient education as appropriate  Outcome: Progressing  Goal: Maintains/Returns to pre admission functional level  Description: INTERVENTIONS:  - Perform BMAT or MOVE assessment daily    - Set and communicate daily mobility goal to care team and patient/family/caregiver  - Collaborate with rehabilitation services on mobility goals if consulted  - Perform Range of Motion 4-6 times a day  - Reposition patient every 2 hours    - Dangle patient 3-4 times a day  - Stand patient 3 times a day  - Ambulate patient 3-4 times a day  - Out of bed for toileting  - Record patient progress and toleration of activity level   Outcome: Progressing     Problem: DISCHARGE PLANNING  Goal: Discharge to home or other facility with appropriate resources  Description: INTERVENTIONS:  - Identify barriers to discharge w/patient and caregiver  - Arrange for needed discharge resources and transportation as appropriate  - Identify discharge learning needs (meds, wound care, etc )  - Arrange for interpretive services to assist at discharge as needed  - Refer to Case Management Department for coordinating discharge planning if the patient needs post-hospital services based on physician/advanced practitioner order or complex needs related to functional status, cognitive ability, or social support system  Outcome: Progressing     Problem: Knowledge Deficit  Goal: Patient/family/caregiver demonstrates understanding of disease process, treatment plan, medications, and discharge instructions  Description: Complete learning assessment and assess knowledge base    Interventions:  - Provide teaching at level of understanding  - Provide teaching via preferred learning methods  Outcome: Progressing     Problem: Potential for Falls  Goal: Patient will remain free of falls  Description: INTERVENTIONS:  - Educate patient/family on patient safety including physical limitations  - Instruct patient to call for assistance with activity   - Consult OT/PT to assist with strengthening/mobility   - Keep Call bell within reach  - Keep bed low and locked with side rails adjusted as appropriate  - Keep care items and personal belongings within reach  - Initiate and maintain comfort rounds  - Make Fall Risk Sign visible to staff  - Offer Toileting every 2 Hours, in advance of need  - Initiate/Maintain bed alarm  - Obtain necessary fall risk management equipment: walker  - Apply yellow socks and bracelet for high fall risk patients  - Consider moving patient to room near nurses station  Outcome: Progressing     Problem: MOBILITY - ADULT  Goal: Maintain or return to baseline ADL function  Description: INTERVENTIONS:  -  Assess patient's ability to carry out ADLs; assess patient's baseline for ADL function and identify physical deficits which impact ability to perform ADLs (bathing, care of mouth/teeth, toileting, grooming, dressing, etc )  - Assess/evaluate cause of self-care deficits   - Assess range of motion  - Assess patient's mobility; develop plan if impaired  - Assess patient's need for assistive devices and provide as appropriate  - Encourage maximum independence but intervene and supervise when necessary  - Involve family in performance of ADLs  - Assess for home care needs following discharge   - Consider OT consult to assist with ADL evaluation and planning for discharge  - Provide patient education as appropriate  Outcome: Progressing  Goal: Maintains/Returns to pre admission functional level  Description: INTERVENTIONS:  - Perform BMAT or MOVE assessment daily    - Set and communicate daily mobility goal to care team and patient/family/caregiver  - Collaborate with rehabilitation services on mobility goals if consulted  - Perform Range of Motion 4-6 times a day  - Reposition patient every 2 hours  - Dangle patient 3-4 times a day  - Stand patient 3 times a day  - Ambulate patient 3-4 times a day  - Out of bed for toileting  - Record patient progress and toleration of activity level   Outcome: Progressing     Problem: Prexisting or High Potential for Compromised Skin Integrity  Goal: Skin integrity is maintained or improved  Description: INTERVENTIONS:  - Identify patients at risk for skin breakdown  - Assess and monitor skin integrity  - Assess and monitor nutrition and hydration status  - Monitor labs   - Assess for incontinence   - Turn and reposition patient  - Assist with mobility/ambulation  - Relieve pressure over bony prominences  - Avoid friction and shearing  - Provide appropriate hygiene as needed including keeping skin clean and dry  - Evaluate need for skin moisturizer/barrier cream  - Collaborate with interdisciplinary team   - Patient/family teaching  - Consider wound care consult   Outcome: Progressing     Problem: Nutrition/Hydration-ADULT  Goal: Nutrient/Hydration intake appropriate for improving, restoring or maintaining nutritional needs  Description: Monitor and assess patient's nutrition/hydration status for malnutrition   Collaborate with interdisciplinary team and initiate plan and interventions as ordered  Monitor patient's weight and dietary intake as ordered or per policy  Utilize nutrition screening tool and intervene as necessary  Determine patient's food preferences and provide high-protein, high-caloric foods as appropriate  INTERVENTIONS:  - Monitor oral intake, urinary output, labs, and treatment plans  - Assess nutrition and hydration status and recommend course of action  - Evaluate amount of meals eaten  - Assist patient with eating if necessary   - Allow adequate time for meals  - Recommend/ encourage appropriate diets, oral nutritional supplements, and vitamin/mineral supplements  - Order, calculate, and assess calorie counts as needed  - Recommend, monitor, and adjust tube feedings and TPN/PPN based on assessed needs  - Assess need for intravenous fluids  - Provide specific nutrition/hydration education as appropriate  - Include patient/family/caregiver in decisions related to nutrition  Outcome: Progressing     Problem: NEUROSENSORY - ADULT  Goal: Achieves maximal functionality and self care  Description: INTERVENTIONS  - Monitor swallowing and airway patency with patient fatigue and changes in neurological status  - Encourage and assist patient to increase activity and self care     - Encourage visually impaired, hearing impaired and aphasic patients to use assistive/communication devices  Outcome: Progressing     Problem: CARDIOVASCULAR - ADULT  Goal: Maintains optimal cardiac output and hemodynamic stability  Description: INTERVENTIONS:  - Monitor I/O, vital signs and rhythm  - Monitor for S/S and trends of decreased cardiac output  - Administer and titrate ordered vasoactive medications to optimize hemodynamic stability  - Assess quality of pulses, skin color and temperature  - Assess for signs of decreased coronary artery perfusion  - Instruct patient to report change in severity of symptoms  Outcome: Progressing     Problem: RESPIRATORY - ADULT  Goal: Achieves optimal ventilation and oxygenation  Description: INTERVENTIONS:  - Assess for changes in respiratory status  - Assess for changes in mentation and behavior  - Position to facilitate oxygenation and minimize respiratory effort  - Oxygen administered by appropriate delivery if ordered  - Initiate smoking cessation education as indicated  - Encourage broncho-pulmonary hygiene including cough, deep breathe, Incentive Spirometry  - Assess the need for suctioning and aspirate as needed  - Assess and instruct to report SOB or any respiratory difficulty  - Respiratory Therapy support as indicated  Outcome: Progressing     Problem: GASTROINTESTINAL - ADULT  Goal: Maintains adequate nutritional intake  Description: INTERVENTIONS:  - Monitor percentage of each meal consumed  - Identify factors contributing to decreased intake, treat as appropriate  - Assist with meals as needed  - Monitor I&O, weight, and lab values if indicated  - Obtain nutrition services referral as needed  Outcome: Progressing     Problem: METABOLIC, FLUID AND ELECTROLYTES - ADULT  Goal: Electrolytes maintained within normal limits  Description: INTERVENTIONS:  - Monitor labs and assess patient for signs and symptoms of electrolyte imbalances  - Administer electrolyte replacement as ordered  - Monitor response to electrolyte replacements, including repeat lab results as appropriate  - Instruct patient on fluid and nutrition as appropriate  Outcome: Progressing  Goal: Fluid balance maintained  Description: INTERVENTIONS:  - Monitor labs   - Monitor I/O and WT  - Instruct patient on fluid and nutrition as appropriate  - Assess for signs & symptoms of volume excess or deficit  Outcome: Progressing     Problem: SKIN/TISSUE INTEGRITY - ADULT  Goal: Skin Integrity remains intact(Skin Breakdown Prevention)  Description: Assess:  -Perform Erickson assessment every shift  -Clean and moisturize skin every day  -Inspect skin when repositioning, toileting, and assisting with ADLS  -Assess extremities for adequate circulation and sensation     Bed Management:  -Have minimal linens on bed & keep smooth, unwrinkled  -Change linens as needed when moist or perspiring  -Avoid sitting or lying in one position for more than 2 hours while in bed  -Keep HOB at 45 degrees     Toileting:  -Offer bedside commode  -Assess for incontinence every 2 hours  -Use incontinent care products after each incontinent episode such as brief    Activity:  -Mobilize patient 3-4 times a day  -Encourage activity and walks on unit  -Encourage or provide ROM exercises   -Turn and reposition patient every 2 Hours  -Use appropriate equipment to lift or move patient in bed  -Instruct/ Assist with weight shifting every 2 when out of bed in chair  -Consider limitation of chair time 2 hour intervals    Skin Care:  -Avoid use of baby powder, tape, friction and shearing, hot water or constrictive clothing  -Relieve pressure over bony prominences using pillows  -Do not massage red bony areas    Next Steps:  -Teach patient strategies to minimize risks such as walker  -Consider consults to  interdisciplinary teams such as wound  Outcome: Progressing  Goal: Incision(s), wounds(s) or drain site(s) healing without S/S of infection  Description: INTERVENTIONS  - Assess and document dressing, incision, wound bed, drain sites and surrounding tissue  - Provide patient and family education  - Perform skin care/dressing changes every PRN  Outcome: Progressing     Problem: MUSCULOSKELETAL - ADULT  Goal: Maintain or return mobility to safest level of function  Description: INTERVENTIONS:  - Assess patient's ability to carry out ADLs; assess patient's baseline for ADL function and identify physical deficits which impact ability to perform ADLs (bathing, care of mouth/teeth, toileting, grooming, dressing, etc )  - Assess/evaluate cause of self-care deficits   - Assess range of motion  - Assess patient's mobility  - Assess patient's need for assistive devices and provide as appropriate  - Encourage maximum independence but intervene and supervise when necessary  - Involve family in performance of ADLs  - Assess for home care needs following discharge   - Consider OT consult to assist with ADL evaluation and planning for discharge  - Provide patient education as appropriate  Outcome: Progressing

## 2022-10-06 NOTE — NURSING NOTE
Called MRI about MRI screening form to be filled out  MRI technologist stated that she will copy information from previous form as patient is unable to answer questions appropriately

## 2022-10-06 NOTE — UTILIZATION REVIEW
Continued Stay Review    Date: 10/5                          Current Patient Class: inpatient    Current Level of Care: med surg     HPI:52 y o  female initially admitted on 9/19/2022     Assessment/Plan: No acute events overnight; appears unchanged from day prior, req ongoing stay pending improvement of mental status  Acute encephalopathy 2ndary to gabapentine & morphine  w subsequent renal failure ; potential component of Psych illness  + MSSA Bacteremia  Cont on IVF, IV antibx  ID rec CHERYLE< Cardiology rec holding off CHERYLE until improvement of resp status & encephalopathy   Weaaned to room air on 10/3    Vital Signs:   Date/Time Temp Pulse Resp BP MAP (mmHg) SpO2 O2 Device Patient Position - Orthostatic VS   10/05/22 19:58:01 97 8 °F (36 6 °C) 103 18 136/78 97 96 % -- --   10/05/22 15:24:36 -- 132 Abnormal  -- 112/93 99 97 % None (Room air) --   10/05/22 14:47:14 97 9 °F (36 6 °C) 104 20 127/70 89 97 % None (Room air) Lying   10/05/22 0815 -- -- -- -- -- -- None (Room air) --   10/05/22 07:16:09 99 1 °F (37 3 °C) 105 20 140/86 104 94 % -- --     GCS  Date and Time Eye Opening Best Verbal Response Best Motor Response Van Lear Coma Scale Score   10/05/22 0100 3 4 5 12   10/04/22 2100 3 4 5 12   10/04/22 1115 3 4 5 12         Pertinent Labs/Diagnostic Results:   Results from last 7 days   Lab Units 10/01/22  0857   SARS-COV-2  Negative     Results from last 7 days   Lab Units 10/05/22  0449 10/04/22  0444 10/03/22  0507 10/02/22  0505 10/01/22  0507   WBC Thousand/uL 11 04* 18 05* 20 32* 15 05* 18 54*   HEMOGLOBIN g/dL 9 9* 10 8* 12 2 12 2 9 7*   HEMATOCRIT % 29 7* 34 5* 38 8 38 4 31 2*   PLATELETS Thousands/uL 326 320 341 339 325   NEUTROS ABS Thousands/µL 5 09 14 05* 16 47* 12 57* 15 75*         Results from last 7 days   Lab Units 10/05/22  0938 10/05/22  0449 10/04/22  0444 10/03/22  0913 10/03/22  0507 10/02/22  0505 10/01/22  0507 10/01/22  0145 09/30/22  0137 09/29/22  0513   SODIUM mmol/L 144 138 146 147  -- 148* 144 146   < > 145   POTASSIUM mmol/L 3 2* 3 3* 2 9* 3 6  --  3 1* 3 2* 3 1*   < > 3 2*   CHLORIDE mmol/L 105 107 102 100  --  102 102 104   < > 103   CO2 mmol/L 32 26 37* 35*  --  38* 33* 34*   < > 30   ANION GAP mmol/L 7 5 7 12  --  8 9 8   < > 12   BUN mg/dL 30* 8 34* 34*  --  36* 39* 41*   < > 41*   CREATININE mg/dL 2 55* 0 79 3 07* 3 73*  --  4 79* 5 78* 5 88*   < > 6 45*   EGFR ml/min/1 73sq m 20 85 16 13  --  9 7 7   < > 6   CALCIUM mg/dL 9 9 9 0 9 7 9 7  --  9 8 9 0 9 3   < > 8 9   MAGNESIUM mg/dL  --   --   --   --  1 7 1 9 2 1 1 7  --  1 8   PHOSPHORUS mg/dL  --   --   --   --  2 6* 2 6* 5 3* 5 4*  --   --     < > = values in this interval not displayed       Results from last 7 days   Lab Units 10/04/22  0444 10/01/22  0507 10/01/22  0145 09/30/22  0137   AST U/L 30 56* 66* 68*   ALT U/L 18 47 45 62   ALK PHOS U/L 78 76 91 85   TOTAL PROTEIN g/dL 6 7 5 6* 6 2* 5 7*   ALBUMIN g/dL 2 5* 2 3* 2 6* 2 4*   TOTAL BILIRUBIN mg/dL 0 36 0 36 0 44 0 40     Results from last 7 days   Lab Units 09/30/22  0024   POC GLUCOSE mg/dl 126     Results from last 7 days   Lab Units 10/05/22  0938 10/05/22  0449 10/04/22  0444 10/03/22  0913 10/02/22  0505 10/01/22  0507 10/01/22  0145 09/30/22  0137 09/29/22  0513   GLUCOSE RANDOM mg/dL 264* 94 137 175* 165* 156* 133 134 84             No results found for: BETA-HYDROXYBUTYRATE   Results from last 7 days   Lab Units 09/29/22  2302   PH ART  7 364   PCO2 ART mm Hg 42 7   PO2 ART mm Hg 86 0   HCO3 ART mmol/L 23 8   BASE EXC ART mmol/L -1 6   O2 CONTENT ART mL/dL 14 6*   O2 HGB, ARTERIAL % 95 2   ABG SOURCE  Radial, Right     Results from last 7 days   Lab Units 10/01/22  0145   PH CHARLES  7 398   PCO2 CHARLES mm Hg 47 4   PO2 CHARLES mm Hg 50 3*   HCO3 CHARLES mmol/L 28 6   BASE EXC CHARLES mmol/L 3 1   O2 CONTENT CHARLES ml/dL 14 3   O2 HGB, VENOUS % 85 2*         Results from last 7 days   Lab Units 09/29/22  0513   CK TOTAL U/L 624*   CK MB INDEX % 13 8*   CK MB ng/mL 85 8* Results from last 7 days   Lab Units 10/04/22  0444   PROCALCITONIN ng/ml 0 26*     Results from last 7 days   Lab Units 10/01/22  0145   LACTIC ACID mmol/L 1 3             Results from last 7 days   Lab Units 09/30/22  0137   NT-PRO BNP pg/mL 44,580*                                                 Results from last 7 days   Lab Units 10/03/22  1823   C DIFF TOXIN B BY PCR  Negative     Medications:   Scheduled Medications:  amLODIPine, 10 mg, Oral, Daily  cefazolin, 2,000 mg, Intravenous, Q8H  gabapentin, 100 mg, Oral, TID  heparin (porcine), 5,000 Units, Subcutaneous, Q8H CHOCO  metoprolol tartrate, 25 mg, Oral, Q12H [address]  VANDANA ANTIFUNGAL, , Topical, BID  nicotine, 1 patch, Transdermal, Daily  QUEtiapine, 50 mg, Oral, BID  saccharomyces boulardii, 250 mg, Oral, BID      Continuous IV Infusions:  dextrose 5 % and sodium chloride 0 2 %, 100 mL/hr, Intravenous, Continuous      PRN Meds:  acetaminophen, 650 mg, Oral, Q6H PRN  ALPRAZolam, 0 5 mg, Oral, HS PRN  hydrALAZINE, 5 mg, Intravenous, Q6H PRN  HYDROmorphone, 0 5 mg, Intravenous, Q4H PRN  levalbuterol, 1 25 mg, Nebulization, Q4H PRN  loperamide, 2 mg, Oral, 4x Daily PRN  OLANZapine, 5 mg, Oral, Q6H PRN  ondansetron, 4 mg, Intravenous, Q4H PRN        Discharge Plan: [facility]    Network Utilization Review Department  ATTENTION: Please call with any questions or concerns to [phone] and carefully listen to the prompts so that you are directed to the right person  All voicemails are confidential   Coleen Johnson all requests for admission clinical reviews, approved or denied determinations and any other requests to dedicated fax number below belonging to the campus where the patient is receiving treatment   List of dedicated fax numbers for the Facilities:  FACILITY NAME UR FAX NUMBER   ADMISSION DENIALS (Administrative/Medical Necessity) [phone]   [address] (Maternity/NICU/Pediatrics) Aleena Carrillot [address] [phone]   [facility] [address] [address] [phone]   [address] [facility] [place] [address] [place] [address] [address] Wanda Miller 28 [phone]   [address]ory [address]ays [facility] [place] [address] [phone]

## 2022-10-06 NOTE — PLAN OF CARE
Problem: PAIN - ADULT  Goal: Verbalizes/displays adequate comfort level or baseline comfort level  Description: Interventions:  - Encourage patient to monitor pain and request assistance  - Assess pain using appropriate pain scale  - Administer analgesics based on type and severity of pain and evaluate response  - Implement non-pharmacological measures as appropriate and evaluate response  - Consider cultural and social influences on pain and pain management  - Notify physician/advanced practitioner if interventions unsuccessful or patient reports new pain  Outcome: Progressing     Problem: INFECTION - ADULT  Goal: Absence or prevention of progression during hospitalization  Description: INTERVENTIONS:  - Assess and monitor for signs and symptoms of infection  - Monitor lab/diagnostic results  - Monitor all insertion sites, i e  indwelling lines, tubes, and drains  - Monitor endotracheal if appropriate and nasal secretions for changes in amount and color  - [place] appropriate cooling/warming therapies per order  - Administer medications as ordered  - Instruct and encourage patient and family to use good hand hygiene technique  - Identify and instruct in appropriate isolation precautions for identified infection/condition  Outcome: Progressing     Problem: SAFETY ADULT  Goal: Patient will remain free of falls  Description: INTERVENTIONS:  - Educate patient/family on patient safety including physical limitations  - Instruct patient to call for assistance with activity   - Consult OT/PT to assist with strengthening/mobility   - Keep Call bell within reach  - Keep bed low and locked with side rails adjusted as appropriate  - Keep care items and personal belongings within reach  - Initiate and maintain comfort rounds  - Make Fall Risk Sign visible to staff  - Offer Toileting every 2 Hours, in advance of need  - Initiate/Maintain bed alarm  - Obtain necessary fall risk management equipment: walker  - Apply yellow socks and bracelet for high fall risk patients  - Consider moving patient to room near nurses station  Outcome: Progressing  Goal: Maintain or return to baseline ADL function  Description: INTERVENTIONS:  -  Assess patient's ability to carry out ADLs; assess patient's baseline for ADL function and identify physical deficits which impact ability to perform ADLs (bathing, care of mouth/teeth, toileting, grooming, dressing, etc )  - Assess/evaluate cause of self-care deficits   - Assess range of motion  - Assess patient's mobility; develop plan if impaired  - Assess patient's need for assistive devices and provide as appropriate  - Encourage maximum independence but intervene and supervise when necessary  - Involve family in performance of ADLs  - Assess for home care needs following discharge   - Consider OT consult to assist with ADL evaluation and planning for discharge  - Provide patient education as appropriate  Outcome: Progressing  Goal: Maintains/Returns to pre admission functional level  Description: INTERVENTIONS:  - Perform BMAT or MOVE assessment daily    - Set and communicate daily mobility goal to care team and patient/family/caregiver  - Collaborate with rehabilitation services on mobility goals if consulted  - Perform Range of Motion 4-6 times a day  - Reposition patient every 2 hours    - Dangle patient 3-4 times a day  - Stand patient 3 times a day  - Ambulate patient 3-4 times a day  - Out of bed for toileting  - Record patient progress and toleration of activity level   Outcome: Progressing     Problem: DISCHARGE PLANNING  Goal: Discharge to home or other facility with appropriate resources  Description: INTERVENTIONS:  - Identify barriers to discharge w/patient and caregiver  - Arrange for needed discharge resources and transportation as appropriate  - Identify discharge learning needs (meds, wound care, etc )  - Arrange for interpretive services to assist at discharge as needed  - Refer to Case Management Department for coordinating discharge planning if the patient needs post-hospital services based on physician/advanced practitioner order or complex needs related to functional status, cognitive ability, or social support system  Outcome: Progressing     Problem: Knowledge Deficit  Goal: Patient/family/caregiver demonstrates understanding of disease process, treatment plan, medications, and discharge instructions  Description: Complete learning assessment and assess knowledge base    Interventions:  - Provide teaching at level of understanding  - Provide teaching via preferred learning methods  Outcome: Progressing     Problem: Potential for Falls  Goal: Patient will remain free of falls  Description: INTERVENTIONS:  - Educate patient/family on patient safety including physical limitations  - Instruct patient to call for assistance with activity   - Consult OT/PT to assist with strengthening/mobility   - Keep Call bell within reach  - Keep bed low and locked with side rails adjusted as appropriate  - Keep care items and personal belongings within reach  - Initiate and maintain comfort rounds  - Make Fall Risk Sign visible to staff  - Offer Toileting every 2 Hours, in advance of need  - Initiate/Maintain bed alarm  - Obtain necessary fall risk management equipment: walker  - Apply yellow socks and bracelet for high fall risk patients  - Consider moving patient to room near nurses station  Outcome: Progressing     Problem: MOBILITY - ADULT  Goal: Maintain or return to baseline ADL function  Description: INTERVENTIONS:  -  Assess patient's ability to carry out ADLs; assess patient's baseline for ADL function and identify physical deficits which impact ability to perform ADLs (bathing, care of mouth/teeth, toileting, grooming, dressing, etc )  - Assess/evaluate cause of self-care deficits   - Assess range of motion  - Assess patient's mobility; develop plan if impaired  - Assess patient's need for assistive devices and provide as appropriate  - Encourage maximum independence but intervene and supervise when necessary  - Involve family in performance of ADLs  - Assess for home care needs following discharge   - Consider OT consult to assist with ADL evaluation and planning for discharge  - Provide patient education as appropriate  Outcome: Progressing  Goal: Maintains/Returns to pre admission functional level  Description: INTERVENTIONS:  - Perform BMAT or MOVE assessment daily    - Set and communicate daily mobility goal to care team and patient/family/caregiver  - Collaborate with rehabilitation services on mobility goals if consulted  - Perform Range of Motion 4-6 times a day  - Reposition patient every 2 hours  - Dangle patient 3-4 times a day  - Stand patient 3 times a day  - Ambulate patient 3-4 times a day  - Out of bed for toileting  - Record patient progress and toleration of activity level   Outcome: Progressing     Problem: Prexisting or High Potential for Compromised Skin Integrity  Goal: Skin integrity is maintained or improved  Description: INTERVENTIONS:  - Identify patients at risk for skin breakdown  - Assess and monitor skin integrity  - Assess and monitor nutrition and hydration status  - Monitor labs   - Assess for incontinence   - Turn and reposition patient  - Assist with mobility/ambulation  - Relieve pressure over bony prominences  - Avoid friction and shearing  - Provide appropriate hygiene as needed including keeping skin clean and dry  - Evaluate need for skin moisturizer/barrier cream  - Collaborate with interdisciplinary team   - Patient/family teaching  - Consider wound care consult   Outcome: Progressing     Problem: Nutrition/Hydration-ADULT  Goal: Nutrient/Hydration intake appropriate for improving, restoring or maintaining nutritional needs  Description: Monitor and assess patient's nutrition/hydration status for malnutrition   Collaborate with interdisciplinary team and initiate plan and interventions as ordered  Monitor patient's weight and dietary intake as ordered or per policy  Utilize nutrition screening tool and intervene as necessary  Determine patient's food preferences and provide high-protein, high-caloric foods as appropriate  INTERVENTIONS:  - Monitor oral intake, urinary output, labs, and treatment plans  - Assess nutrition and hydration status and recommend course of action  - Evaluate amount of meals eaten  - Assist patient with eating if necessary   - Allow adequate time for meals  - Recommend/ encourage appropriate diets, oral nutritional supplements, and vitamin/mineral supplements  - Order, calculate, and assess calorie counts as needed  - Recommend, monitor, and adjust tube feedings and TPN/PPN based on assessed needs  - Assess need for intravenous fluids  - Provide specific nutrition/hydration education as appropriate  - Include patient/family/caregiver in decisions related to nutrition  Outcome: Progressing     Problem: NEUROSENSORY - ADULT  Goal: Achieves maximal functionality and self care  Description: INTERVENTIONS  - Monitor swallowing and airway patency with patient fatigue and changes in neurological status  - Encourage and assist patient to increase activity and self care     - Encourage visually impaired, hearing impaired and aphasic patients to use assistive/communication devices  Outcome: Progressing     Problem: CARDIOVASCULAR - ADULT  Goal: Maintains optimal cardiac output and hemodynamic stability  Description: INTERVENTIONS:  - Monitor I/O, vital signs and rhythm  - Monitor for S/S and trends of decreased cardiac output  - Administer and titrate ordered vasoactive medications to optimize hemodynamic stability  - Assess quality of pulses, skin color and temperature  - Assess for signs of decreased coronary artery perfusion  - Instruct patient to report change in severity of symptoms  Outcome: Progressing     Problem: RESPIRATORY - ADULT  Goal: Achieves optimal ventilation and oxygenation  Description: INTERVENTIONS:  - Assess for changes in respiratory status  - Assess for changes in mentation and behavior  - Position to facilitate oxygenation and minimize respiratory effort  - Oxygen administered by appropriate delivery if ordered  - Initiate smoking cessation education as indicated  - Encourage broncho-pulmonary hygiene including cough, deep breathe, Incentive Spirometry  - Assess the need for suctioning and aspirate as needed  - Assess and instruct to report SOB or any respiratory difficulty  - Respiratory Therapy support as indicated  Outcome: Progressing     Problem: GASTROINTESTINAL - ADULT  Goal: Maintains adequate nutritional intake  Description: INTERVENTIONS:  - Monitor percentage of each meal consumed  - Identify factors contributing to decreased intake, treat as appropriate  - Assist with meals as needed  - Monitor I&O, weight, and lab values if indicated  - Obtain nutrition services referral as needed  Outcome: Progressing     Problem: METABOLIC, FLUID AND ELECTROLYTES - ADULT  Goal: Electrolytes maintained within normal limits  Description: INTERVENTIONS:  - Monitor labs and assess patient for signs and symptoms of electrolyte imbalances  - Administer electrolyte replacement as ordered  - Monitor response to electrolyte replacements, including repeat lab results as appropriate  - Instruct patient on fluid and nutrition as appropriate  Outcome: Progressing  Goal: Fluid balance maintained  Description: INTERVENTIONS:  - Monitor labs   - Monitor I/O and WT  - Instruct patient on fluid and nutrition as appropriate  - Assess for signs & symptoms of volume excess or deficit  Outcome: Progressing     Problem: SKIN/TISSUE INTEGRITY - ADULT  Goal: Skin Integrity remains intact(Skin Breakdown Prevention)  Description: Assess:  -Perform Erickson assessment every shift  -Clean and moisturize skin every day  -Inspect skin when repositioning, toileting, and assisting with ADLS  -Assess extremities for adequate circulation and sensation     Bed Management:  -Have minimal linens on bed & keep smooth, unwrinkled  -Change linens as needed when moist or perspiring  -Avoid sitting or lying in one position for more than 2 hours while in bed  -Keep HOB at 45 degrees     Toileting:  -Offer bedside commode  -Assess for incontinence every 2 hours  -Use incontinent care products after each incontinent episode such as brief    Activity:  -Mobilize patient 3-4 times a day  -Encourage activity and walks on unit  -Encourage or provide ROM exercises   -Turn and reposition patient every 2 Hours  -Use appropriate equipment to lift or move patient in bed  -Instruct/ Assist with weight shifting every 2 when out of bed in chair  -Consider limitation of chair time 2 hour intervals    Skin Care:  -Avoid use of baby powder, tape, friction and shearing, hot water or constrictive clothing  -Relieve pressure over bony prominences using pillows  -Do not massage red bony areas    Next Steps:  -Teach patient strategies to minimize risks such as walker  -Consider consults to  interdisciplinary teams such as wound  Outcome: Progressing  Goal: Incision(s), wounds(s) or drain site(s) healing without S/S of infection  Description: INTERVENTIONS  - Assess and document dressing, incision, wound bed, drain sites and surrounding tissue  - Provide patient and family education  - Perform skin care/dressing changes every PRN  Outcome: Progressing     Problem: MUSCULOSKELETAL - ADULT  Goal: Maintain or return mobility to safest level of function  Description: INTERVENTIONS:  - Assess patient's ability to carry out ADLs; assess patient's baseline for ADL function and identify physical deficits which impact ability to perform ADLs (bathing, care of mouth/teeth, toileting, grooming, dressing, etc )  - Assess/evaluate cause of self-care deficits   - Assess range of motion  - Assess patient's mobility  - Assess patient's need for assistive devices and provide as appropriate  - Encourage maximum independence but intervene and supervise when necessary  - Involve family in performance of ADLs  - Assess for home care needs following discharge   - Consider OT consult to assist with ADL evaluation and planning for discharge  - Provide patient education as appropriate  Outcome: Progressing

## 2022-10-06 NOTE — TELEPHONE ENCOUNTER
Patient  called in to give Mariana Rollins an update on the patient,  can be reached at [phone]  Please advise   Thank you

## 2022-10-07 LAB
ANION GAP SERPL CALCULATED.3IONS-SCNC: 7 MMOL/L (ref 4–13)
BASOPHILS # BLD AUTO: 0.1 THOUSANDS/ÂΜL (ref 0–0.1)
BASOPHILS NFR BLD AUTO: 1 % (ref 0–1)
BUN SERPL-MCNC: 24 MG/DL (ref 5–25)
CALCIUM SERPL-MCNC: 10.2 MG/DL (ref 8.3–10.1)
CHLORIDE SERPL-SCNC: 104 MMOL/L (ref 96–108)
CO2 SERPL-SCNC: 28 MMOL/L (ref 21–32)
CREAT SERPL-MCNC: 1.83 MG/DL (ref 0.6–1.3)
EOSINOPHIL # BLD AUTO: 0.16 THOUSAND/ÂΜL (ref 0–0.61)
EOSINOPHIL NFR BLD AUTO: 1 % (ref 0–6)
ERYTHROCYTE [DISTWIDTH] IN BLOOD BY AUTOMATED COUNT: 14 % (ref 11.6–15.1)
GFR SERPL CREATININE-BSD FRML MDRD: 30 ML/MIN/1.73SQ M
GLUCOSE SERPL-MCNC: 114 MG/DL (ref 65–140)
HCT VFR BLD AUTO: 34 % (ref 34.8–46.1)
HGB BLD-MCNC: 11 G/DL (ref 11.5–15.4)
IMM GRANULOCYTES # BLD AUTO: 0.11 THOUSAND/UL (ref 0–0.2)
IMM GRANULOCYTES NFR BLD AUTO: 1 % (ref 0–2)
LYMPHOCYTES # BLD AUTO: 2.34 THOUSANDS/ÂΜL (ref 0.6–4.47)
LYMPHOCYTES NFR BLD AUTO: 17 % (ref 14–44)
MCH RBC QN AUTO: 32.2 PG (ref 26.8–34.3)
MCHC RBC AUTO-ENTMCNC: 32.4 G/DL (ref 31.4–37.4)
MCV RBC AUTO: 99 FL (ref 82–98)
MONOCYTES # BLD AUTO: 0.74 THOUSAND/ÂΜL (ref 0.17–1.22)
MONOCYTES NFR BLD AUTO: 5 % (ref 4–12)
NEUTROPHILS # BLD AUTO: 10.16 THOUSANDS/ÂΜL (ref 1.85–7.62)
NEUTS SEG NFR BLD AUTO: 75 % (ref 43–75)
NRBC BLD AUTO-RTO: 0 /100 WBCS
PLATELET # BLD AUTO: 299 THOUSANDS/UL (ref 149–390)
PMV BLD AUTO: 10.9 FL (ref 8.9–12.7)
POTASSIUM SERPL-SCNC: 4.3 MMOL/L (ref 3.5–5.3)
RBC # BLD AUTO: 3.42 MILLION/UL (ref 3.81–5.12)
SODIUM SERPL-SCNC: 139 MMOL/L (ref 135–147)
WBC # BLD AUTO: 13.61 THOUSAND/UL (ref 4.31–10.16)

## 2022-10-07 RX ADMIN — HEPARIN SODIUM 5000 UNITS: 5000 INJECTION INTRAVENOUS; SUBCUTANEOUS at 14:17

## 2022-10-07 RX ADMIN — QUETIAPINE FUMARATE 50 MG: 25 TABLET ORAL at 08:19

## 2022-10-07 RX ADMIN — GABAPENTIN 100 MG: 100 CAPSULE ORAL at 08:20

## 2022-10-07 RX ADMIN — GABAPENTIN 100 MG: 100 CAPSULE ORAL at 21:24

## 2022-10-07 RX ADMIN — CEFAZOLIN SODIUM 2000 MG: 2 SOLUTION INTRAVENOUS at 01:56

## 2022-10-07 RX ADMIN — MICONAZOLE NITRATE 1 APPLICATION: 20 CREAM TOPICAL at 17:06

## 2022-10-07 RX ADMIN — METOPROLOL TARTRATE 25 MG: 25 TABLET, FILM COATED ORAL at 08:20

## 2022-10-07 RX ADMIN — METOPROLOL TARTRATE 25 MG: 25 TABLET, FILM COATED ORAL at 21:24

## 2022-10-07 RX ADMIN — LOPERAMIDE HCL 2 MG: 1 SOLUTION ORAL at 08:20

## 2022-10-07 RX ADMIN — MICONAZOLE NITRATE 1 APPLICATION: 20 CREAM TOPICAL at 08:21

## 2022-10-07 RX ADMIN — HEPARIN SODIUM 5000 UNITS: 5000 INJECTION INTRAVENOUS; SUBCUTANEOUS at 21:24

## 2022-10-07 RX ADMIN — HEPARIN SODIUM 5000 UNITS: 5000 INJECTION INTRAVENOUS; SUBCUTANEOUS at 06:43

## 2022-10-07 RX ADMIN — GABAPENTIN 100 MG: 100 CAPSULE ORAL at 17:06

## 2022-10-07 RX ADMIN — CEFAZOLIN SODIUM 2000 MG: 2 SOLUTION INTRAVENOUS at 14:17

## 2022-10-07 RX ADMIN — QUETIAPINE FUMARATE 50 MG: 25 TABLET ORAL at 17:06

## 2022-10-07 RX ADMIN — Medication 250 MG: at 08:19

## 2022-10-07 RX ADMIN — Medication 250 MG: at 17:06

## 2022-10-07 RX ADMIN — AMLODIPINE BESYLATE 10 MG: 10 TABLET ORAL at 08:20

## 2022-10-07 NOTE — PLAN OF CARE
Problem: PAIN - ADULT  Goal: Verbalizes/displays adequate comfort level or baseline comfort level  Description: Interventions:  - Encourage patient to monitor pain and request assistance  - Assess pain using appropriate pain scale  - Administer analgesics based on type and severity of pain and evaluate response  - Implement non-pharmacological measures as appropriate and evaluate response  - Consider cultural and social influences on pain and pain management  - Notify physician/advanced practitioner if interventions unsuccessful or patient reports new pain  Outcome: Progressing     Problem: INFECTION - ADULT  Goal: Absence or prevention of progression during hospitalization  Description: INTERVENTIONS:  - Assess and monitor for signs and symptoms of infection  - Monitor lab/diagnostic results  - Monitor all insertion sites, i e  indwelling lines, tubes, and drains  - Monitor endotracheal if appropriate and nasal secretions for changes in amount and color  - Penrose appropriate cooling/warming therapies per order  - Administer medications as ordered  - Instruct and encourage patient and family to use good hand hygiene technique  - Identify and instruct in appropriate isolation precautions for identified infection/condition  Outcome: Progressing     Problem: SAFETY ADULT  Goal: Patient will remain free of falls  Description: INTERVENTIONS:  - Educate patient/family on patient safety including physical limitations  - Instruct patient to call for assistance with activity   - Consult OT/PT to assist with strengthening/mobility   - Keep Call bell within reach  - Keep bed low and locked with side rails adjusted as appropriate  - Keep care items and personal belongings within reach  - Initiate and maintain comfort rounds  - Make Fall Risk Sign visible to staff  - Offer Toileting every 2 Hours, in advance of need  - Initiate/Maintain bed alarm  - Obtain necessary fall risk management equipment: walker  - Apply yellow socks and bracelet for high fall risk patients  - Consider moving patient to room near nurses station  Outcome: Progressing  Goal: Maintain or return to baseline ADL function  Description: INTERVENTIONS:  -  Assess patient's ability to carry out ADLs; assess patient's baseline for ADL function and identify physical deficits which impact ability to perform ADLs (bathing, care of mouth/teeth, toileting, grooming, dressing, etc )  - Assess/evaluate cause of self-care deficits   - Assess range of motion  - Assess patient's mobility; develop plan if impaired  - Assess patient's need for assistive devices and provide as appropriate  - Encourage maximum independence but intervene and supervise when necessary  - Involve family in performance of ADLs  - Assess for home care needs following discharge   - Consider OT consult to assist with ADL evaluation and planning for discharge  - Provide patient education as appropriate  Outcome: Progressing  Goal: Maintains/Returns to pre admission functional level  Description: INTERVENTIONS:  - Perform BMAT or MOVE assessment daily    - Set and communicate daily mobility goal to care team and patient/family/caregiver  - Collaborate with rehabilitation services on mobility goals if consulted  - Perform Range of Motion 4-6 times a day  - Reposition patient every 2 hours    - Dangle patient 3-4 times a day  - Stand patient 3 times a day  - Ambulate patient 3-4 times a day  - Out of bed for toileting  - Record patient progress and toleration of activity level   Outcome: Progressing     Problem: DISCHARGE PLANNING  Goal: Discharge to home or other facility with appropriate resources  Description: INTERVENTIONS:  - Identify barriers to discharge w/patient and caregiver  - Arrange for needed discharge resources and transportation as appropriate  - Identify discharge learning needs (meds, wound care, etc )  - Arrange for interpretive services to assist at discharge as needed  - Refer to Case Management Department for coordinating discharge planning if the patient needs post-hospital services based on physician/advanced practitioner order or complex needs related to functional status, cognitive ability, or social support system  Outcome: Progressing     Problem: Knowledge Deficit  Goal: Patient/family/caregiver demonstrates understanding of disease process, treatment plan, medications, and discharge instructions  Description: Complete learning assessment and assess knowledge base    Interventions:  - Provide teaching at level of understanding  - Provide teaching via preferred learning methods  Outcome: Progressing     Problem: Potential for Falls  Goal: Patient will remain free of falls  Description: INTERVENTIONS:  - Educate patient/family on patient safety including physical limitations  - Instruct patient to call for assistance with activity   - Consult OT/PT to assist with strengthening/mobility   - Keep Call bell within reach  - Keep bed low and locked with side rails adjusted as appropriate  - Keep care items and personal belongings within reach  - Initiate and maintain comfort rounds  - Make Fall Risk Sign visible to staff  - Offer Toileting every 2 Hours, in advance of need  - Initiate/Maintain bed alarm  - Obtain necessary fall risk management equipment: walker  - Apply yellow socks and bracelet for high fall risk patients  - Consider moving patient to room near nurses station  Outcome: Progressing     Problem: MOBILITY - ADULT  Goal: Maintain or return to baseline ADL function  Description: INTERVENTIONS:  -  Assess patient's ability to carry out ADLs; assess patient's baseline for ADL function and identify physical deficits which impact ability to perform ADLs (bathing, care of mouth/teeth, toileting, grooming, dressing, etc )  - Assess/evaluate cause of self-care deficits   - Assess range of motion  - Assess patient's mobility; develop plan if impaired  - Assess patient's need for assistive devices and provide as appropriate  - Encourage maximum independence but intervene and supervise when necessary  - Involve family in performance of ADLs  - Assess for home care needs following discharge   - Consider OT consult to assist with ADL evaluation and planning for discharge  - Provide patient education as appropriate  Outcome: Progressing  Goal: Maintains/Returns to pre admission functional level  Description: INTERVENTIONS:  - Perform BMAT or MOVE assessment daily    - Set and communicate daily mobility goal to care team and patient/family/caregiver  - Collaborate with rehabilitation services on mobility goals if consulted  - Perform Range of Motion 4-6 times a day  - Reposition patient every 2 hours  - Dangle patient 3-4 times a day  - Stand patient 3 times a day  - Ambulate patient 3-4 times a day  - Out of bed for toileting  - Record patient progress and toleration of activity level   Outcome: Progressing     Problem: Prexisting or High Potential for Compromised Skin Integrity  Goal: Skin integrity is maintained or improved  Description: INTERVENTIONS:  - Identify patients at risk for skin breakdown  - Assess and monitor skin integrity  - Assess and monitor nutrition and hydration status  - Monitor labs   - Assess for incontinence   - Turn and reposition patient  - Assist with mobility/ambulation  - Relieve pressure over bony prominences  - Avoid friction and shearing  - Provide appropriate hygiene as needed including keeping skin clean and dry  - Evaluate need for skin moisturizer/barrier cream  - Collaborate with interdisciplinary team   - Patient/family teaching  - Consider wound care consult   Outcome: Progressing     Problem: Nutrition/Hydration-ADULT  Goal: Nutrient/Hydration intake appropriate for improving, restoring or maintaining nutritional needs  Description: Monitor and assess patient's nutrition/hydration status for malnutrition   Collaborate with interdisciplinary team and initiate plan and interventions as ordered  Monitor patient's weight and dietary intake as ordered or per policy  Utilize nutrition screening tool and intervene as necessary  Determine patient's food preferences and provide high-protein, high-caloric foods as appropriate  INTERVENTIONS:  - Monitor oral intake, urinary output, labs, and treatment plans  - Assess nutrition and hydration status and recommend course of action  - Evaluate amount of meals eaten  - Assist patient with eating if necessary   - Allow adequate time for meals  - Recommend/ encourage appropriate diets, oral nutritional supplements, and vitamin/mineral supplements  - Order, calculate, and assess calorie counts as needed  - Recommend, monitor, and adjust tube feedings and TPN/PPN based on assessed needs  - Assess need for intravenous fluids  - Provide specific nutrition/hydration education as appropriate  - Include patient/family/caregiver in decisions related to nutrition  Outcome: Progressing     Problem: NEUROSENSORY - ADULT  Goal: Achieves maximal functionality and self care  Description: INTERVENTIONS  - Monitor swallowing and airway patency with patient fatigue and changes in neurological status  - Encourage and assist patient to increase activity and self care     - Encourage visually impaired, hearing impaired and aphasic patients to use assistive/communication devices  Outcome: Progressing     Problem: CARDIOVASCULAR - ADULT  Goal: Maintains optimal cardiac output and hemodynamic stability  Description: INTERVENTIONS:  - Monitor I/O, vital signs and rhythm  - Monitor for S/S and trends of decreased cardiac output  - Administer and titrate ordered vasoactive medications to optimize hemodynamic stability  - Assess quality of pulses, skin color and temperature  - Assess for signs of decreased coronary artery perfusion  - Instruct patient to report change in severity of symptoms  Outcome: Progressing     Problem: RESPIRATORY - ADULT  Goal: Achieves optimal ventilation and oxygenation  Description: INTERVENTIONS:  - Assess for changes in respiratory status  - Assess for changes in mentation and behavior  - Position to facilitate oxygenation and minimize respiratory effort  - Oxygen administered by appropriate delivery if ordered  - Initiate smoking cessation education as indicated  - Encourage broncho-pulmonary hygiene including cough, deep breathe, Incentive Spirometry  - Assess the need for suctioning and aspirate as needed  - Assess and instruct to report SOB or any respiratory difficulty  - Respiratory Therapy support as indicated  Outcome: Progressing     Problem: GASTROINTESTINAL - ADULT  Goal: Maintains adequate nutritional intake  Description: INTERVENTIONS:  - Monitor percentage of each meal consumed  - Identify factors contributing to decreased intake, treat as appropriate  - Assist with meals as needed  - Monitor I&O, weight, and lab values if indicated  - Obtain nutrition services referral as needed  Outcome: Progressing     Problem: METABOLIC, FLUID AND ELECTROLYTES - ADULT  Goal: Electrolytes maintained within normal limits  Description: INTERVENTIONS:  - Monitor labs and assess patient for signs and symptoms of electrolyte imbalances  - Administer electrolyte replacement as ordered  - Monitor response to electrolyte replacements, including repeat lab results as appropriate  - Instruct patient on fluid and nutrition as appropriate  Outcome: Progressing  Goal: Fluid balance maintained  Description: INTERVENTIONS:  - Monitor labs   - Monitor I/O and WT  - Instruct patient on fluid and nutrition as appropriate  - Assess for signs & symptoms of volume excess or deficit  Outcome: Progressing     Problem: SKIN/TISSUE INTEGRITY - ADULT  Goal: Skin Integrity remains intact(Skin Breakdown Prevention)  Description: Assess:  -Perform Erickson assessment every shift  -Clean and moisturize skin every day  -Inspect skin when repositioning, toileting, and assisting with ADLS  -Assess extremities for adequate circulation and sensation     Bed Management:  -Have minimal linens on bed & keep smooth, unwrinkled  -Change linens as needed when moist or perspiring  -Avoid sitting or lying in one position for more than 2 hours while in bed  -Keep HOB at 45 degrees     Toileting:  -Offer bedside commode  -Assess for incontinence every 2 hours  -Use incontinent care products after each incontinent episode such as brief    Activity:  -Mobilize patient 3-4 times a day  -Encourage activity and walks on unit  -Encourage or provide ROM exercises   -Turn and reposition patient every 2 Hours  -Use appropriate equipment to lift or move patient in bed  -Instruct/ Assist with weight shifting every 2 when out of bed in chair  -Consider limitation of chair time 2 hour intervals    Skin Care:  -Avoid use of baby powder, tape, friction and shearing, hot water or constrictive clothing  -Relieve pressure over bony prominences using pillows  -Do not massage red bony areas    Next Steps:  -Teach patient strategies to minimize risks such as walker  -Consider consults to  interdisciplinary teams such as wound  Outcome: Progressing  Goal: Incision(s), wounds(s) or drain site(s) healing without S/S of infection  Description: INTERVENTIONS  - Assess and document dressing, incision, wound bed, drain sites and surrounding tissue  - Provide patient and family education  - Perform skin care/dressing changes every PRN  Outcome: Progressing     Problem: MUSCULOSKELETAL - ADULT  Goal: Maintain or return mobility to safest level of function  Description: INTERVENTIONS:  - Assess patient's ability to carry out ADLs; assess patient's baseline for ADL function and identify physical deficits which impact ability to perform ADLs (bathing, care of mouth/teeth, toileting, grooming, dressing, etc )  - Assess/evaluate cause of self-care deficits   - Assess range of motion  - Assess patient's mobility  - Assess patient's need for assistive devices and provide as appropriate  - Encourage maximum independence but intervene and supervise when necessary  - Involve family in performance of ADLs  - Assess for home care needs following discharge   - Consider OT consult to assist with ADL evaluation and planning for discharge  - Provide patient education as appropriate  Outcome: Progressing

## 2022-10-07 NOTE — PROGRESS NOTES
[address] [facility]  Progress Note - Belkis Harrell 1970, 45 y o  female MRN: [id number]  Unit/Bed#: [facility] 2 -01 Encounter: [id number]  Primary Care Provider: Ni Yanes MD   Date and time admitted to hospital: 9/19/2022  8:04 AM    * Toxic metabolic encephalopathy  Assessment & Plan  · Acute encephalopathy secondary to gabapentin and morphine with subsequent renal failure, potentially component of psychiatric illness given history and bacteremia  · Consider hospital delirium due to prolong stay  · Received course of thiamine concern for Wernicke's encephalopathy  · LP and MRI negative for acute pathology, neurology evaluated  · Discussed with neurology, patient likely will have slow improvement if any over the coming weeks  · Continue dysphagia diet, patient tolerating, ST following        Diarrhea  Assessment & Plan  Reports having multiple loose bowel movements  C diff negative, no prior history  Continue florastor, immodium prn    Bacteremia due to methicillin susceptible Staphylococcus aureus (MSSA)  Assessment & Plan  MSSA bacteremia, repeat cultures neg for greater than 72 hours  Echo with no vegetation  ID recommend CHERYLE  Continue Ancef, continue po vanc for prophylaxis  Discussed with Cardiology, will plan for TTE, if images are susceptible, may be able to avoid CHERYLE  Duration of antibiotics will be based on echo    Leg edema, right  Assessment & Plan  · Likely source of rhabdomyolysis  No evidence of compartment syndrome fracture or DVT on imaging  · 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    · Ortho had previously evaluated  · Will obtain MRI of left lower extremity, unclear patient will be able to tolerate or sit still for imaging      Traumatic rhabdomyolysis [facility])  Assessment & Plan  · Secondary to fall, patient had been laying in bed for approximately 4 days  · She was subsequently brought to the hospital and found to be in acute rhabdomyolysis with ANUJA  · Had been on multiple sedating medications including MS Contin as well as gabapentin, which likely were contributing to sedation  · Now resolved    ARF (acute renal failure) ([facility] [address] )  Assessment & Plan  · ANUJA/ATN secondary to rhabdomyolysis  No evidence of renal obstruction on imaging  · Nephrology following  · No emergent need for hemodialysis  · Renal function improving, nephrology signed off    Results from last 7 days   Lab Units 10/07/22  0834 10/06/22  0622 10/05/22  5103 10/05/22  0449 10/04/22  0444 10/03/22  0913 10/02/22  0505 10/01/22  0507 10/01/22  0145   BUN mg/dL 24 24 30* 8 34* 34* 36* 39* 41*   CREATININE mg/dL 1 83* 2 03* 2 55* 0 79 3 07* 3 73* 4 79* 5 78* 5 88*   EGFR ml/min/1 73sq m 30 27 20 85 16 13 9 7 7       DDD (degenerative disc disease), lumbosacral  Assessment & Plan  · Lumbar radiculopathy with previously scheduled surgery now on hold  · Prior to admission was on gabapentin and morphine IR 15 mg  · Gabapentin resumed at lower dose, hold morphine due to concern for over sedation      Tobacco abuse  Assessment & Plan  · Nicotine patch ordered    Depression  Assessment & Plan  · Does have history of suicide attempt a currently does not show any signs of thoughts of self-harm  · Psychiatry consultation appreciated  · Continue Seroquel 50 mg twice a day   · Zyprexa 5 mg by mouth every 6 hours PRN agitation        VTE Pharmacologic Prophylaxis:   Pharmacologic: Heparin  Mechanical VTE Prophylaxis in Place: Yes    Patient Centered Rounds: I have performed bedside rounds with nursing staff today  Discussions with Specialists or Other Care Team Provider: None    Education and Discussions with Family / Patient:     Time Spent for Care: 30 minutes  More than 50% of total time spent on counseling and coordination of care as described above      Current Length of Stay: 18 day(s)    Current Patient Status: Inpatient   Certification Statement: The patient will continue to require additional inpatient hospital stay due to repeat 2D echo, improvement in mental status    Discharge Plan: pending    Code Status: Level 1 - Full Code      Subjective:   No events overnight  Alert, awake does appear to be confused  Reports that is tolerating diet  Continues to have diarrhea  Objective:     Vitals:   Temp (24hrs), Av 9 °F (36 6 °C), Min:97 6 °F (36 4 °C), Max:98 2 °F (36 8 °C)    Temp:  [97 6 °F (36 4 °C)-98 2 °F (36 8 °C)] 97 6 °F (36 4 °C)  HR:  [] 91  Resp:  [16-21] 21  BP: (118-141)/(78-86) 138/86  SpO2:  [92 %-99 %] 92 %  Body mass index is 18 33 kg/m²  Input and Output Summary (last 24 hours):     No intake or output data in the 24 hours ending 10/07/22 1157    Physical Exam:     Physical Exam  Vitals and nursing note reviewed  Constitutional:       General: She is not in acute distress  Appearance: She is not ill-appearing  HENT:      Head: Normocephalic and atraumatic  Eyes:      General: No scleral icterus  Conjunctiva/sclera: Conjunctivae normal    Cardiovascular:      Rate and Rhythm: Normal rate  Pulses: Normal pulses  Pulmonary:      Effort: Pulmonary effort is normal  No respiratory distress  Abdominal:      General: Bowel sounds are normal  There is no distension  Palpations: Abdomen is soft  Musculoskeletal:         General: No swelling  Right lower leg: No edema  Left lower leg: No edema  Skin:     General: Skin is warm and dry  Neurological:      Mental Status: She is disoriented  Psychiatric:         Behavior: Behavior is slowed  Behavior is cooperative  Cognition and Memory: Cognition is impaired  Judgment: Judgment is impulsive             Additional Data:     Labs:    Results from last 7 days   Lab Units 10/07/22  0834   WBC Thousand/uL 13 61*   HEMOGLOBIN g/dL 11 0*   HEMATOCRIT % 34 0*   PLATELETS Thousands/uL 299   NEUTROS PCT % 75   LYMPHS PCT % 17   MONOS PCT % 5   EOS PCT % 1     Results from last 7 days Lab Units 10/07/22  0834 10/05/22  0449 10/04/22  0444   SODIUM mmol/L 139   < > 146   POTASSIUM mmol/L 4 3   < > 2 9*   CHLORIDE mmol/L 104   < > 102   CO2 mmol/L 28   < > 37*   BUN mg/dL 24   < > 34*   CREATININE mg/dL 1 83*   < > 3 07*   ANION GAP mmol/L 7   < > 7   CALCIUM mg/dL 10 2*   < > 9 7   ALBUMIN g/dL  --   --  2 5*   TOTAL BILIRUBIN mg/dL  --   --  0 36   ALK PHOS U/L  --   --  78   ALT U/L  --   --  18   AST U/L  --   --  30   GLUCOSE RANDOM mg/dL 114   < > 137    < > = values in this interval not displayed  Results from last 7 days   Lab Units 10/04/22  0444 10/01/22  0145   LACTIC ACID mmol/L  --  1 3   PROCALCITONIN ng/ml 0 26*  --            * I Have Reviewed All Lab Data Listed Above  * Additional Pertinent Lab Tests Reviewed: Matilde  Admission Reviewed    Imaging:    CT chest abdomen pelvis wo contrast    Result Date: 9/19/2022  Impression: Bilateral groundglass opacities with superimposed inter and intralobular septal thickening  Differential diagnosis includes pulmonary hemorrhage, infection, and pulmonary edema, although the distribution is atypical for edema  The study was marked in [facility] for immediate notification  Workstation performed: [id number]     XR hip/pelv 2-3 vws right    Result Date: 9/19/2022  Impression: No acute osseous abnormality  Workstation performed: [id number]     XR ankle 3+ views RIGHT    Result Date: 9/19/2022  Impression: No acute osseous abnormality  Workstation performed: [id number]     XR foot 3+ views RIGHT    Result Date: 9/19/2022  Impression: No acute osseous abnormality  Workstation performed: [id number]     CT head without contrast    Result Date: 9/19/2022  Impression: No acute intracranial process  No skull fracture  Workstation performed: [id number]     CT spine cervical without contrast    Result Date: 9/19/2022  Impression: No cervical spine fracture or traumatic malalignment   Incidental finding of partially visualized subpleural groundglass opacity in the left pulmonary apex presumably scarring  Cannot exclude infiltrate  This study demonstrates a significant  finding and was documented as such in [facility] for liaison and referring practitioner notification  Workstation performed: [id number]     MRI brain wo contrast    Result Date: 9/23/2022  Impression: No evidence of recent infarct  No mass effect or midline shift  Study performed in the limited fashion with extensive motion artifact  Workstation performed: [id number]     XR chest 1 view    Result Date: 9/19/2022  Impression: No acute cardiopulmonary disease  Findings are stable Workstation performed: [id number]     US right upper quadrant with liver dopplers    Result Date: 9/20/2022  Impression: 1  Minimal layering sludge without evidence of acute cholecystitis  2   Normal-appearing liver with normal liver Dopplers  Workstation performed: [id number]     CT lower extremity wo contrast right    Result Date: 9/19/2022  Impression: There is no evidence of intramuscular hematoma or other mass lesion in the right calf  Please note that rhabdomyolysis and compartment syndrome are clinical diagnoses, and are not excluded based on these imaging findings   Workstation performed: [id number]       Recent Cultures (last 7 days):     Results from last 7 days   Lab Units 10/03/22  1823   C DIFF TOXIN B BY PCR  Negative       Last 24 Hours Medication List:   Current Facility-Administered Medications   Medication Dose Route Frequency Provider Last Rate   • acetaminophen  650 mg Oral Q6H PRN Honey Apple DO     • ALPRAZolam  0 5 mg Oral HS PRN Maya Hooker MD     • amLODIPine  10 mg Oral Daily Anai Crabtree MD     • cefazolin  2,000 mg Intravenous Q12H Billy Carey MD 2,000 mg (10/07/22 0156)   • gabapentin  100 mg Oral TID Maya Hooker MD     • heparin (porcine)  5,000 Units Subcutaneous [facility] Atul Adan DO     • hydrALAZINE  5 mg Intravenous Q6H PRN Sami Hodges, DO     • HYDROmorphone  0 5 mg Intravenous Q4H PRN Kapil Mtz DO     • levalbuterol  1 25 mg Nebulization Q4H PRN Sami Hodges DO     • loperamide  2 mg Oral 4x Daily PRN Kenroy Beverly MD     • metoprolol tartrate  25 mg Oral Q12H [address] Inder Knox MD     • VANDANA ANTIFUNGAL   Topical BID Sami Hodges DO     • nicotine  1 patch Transdermal Daily Atul Adan DO     • OLANZapine  5 mg Oral Q6H PRN Sami Hodges DO     • ondansetron  4 mg Intravenous Q4H PRN Kapil Mtz DO     • QUEtiapine  50 mg Oral BID Kenroy Beverly MD     • saccharomyces boulardii  250 mg Oral BID Kenroy Beverly MD          Today, Patient Was Seen By: Kenroy Beverly    ** Please Note: Dictation voice to text software may have been used in the creation of this document   **

## 2022-10-07 NOTE — PLAN OF CARE
Problem: PAIN - ADULT  Goal: Verbalizes/displays adequate comfort level or baseline comfort level  Description: Interventions:  - Encourage patient to monitor pain and request assistance  - Assess pain using appropriate pain scale  - Administer analgesics based on type and severity of pain and evaluate response  - Implement non-pharmacological measures as appropriate and evaluate response  - Consider cultural and social influences on pain and pain management  - Notify physician/advanced practitioner if interventions unsuccessful or patient reports new pain  Outcome: Progressing     Problem: INFECTION - ADULT  Goal: Absence or prevention of progression during hospitalization  Description: INTERVENTIONS:  - Assess and monitor for signs and symptoms of infection  - Monitor lab/diagnostic results  - Monitor all insertion sites, i e  indwelling lines, tubes, and drains  - Monitor endotracheal if appropriate and nasal secretions for changes in amount and color  - [place] appropriate cooling/warming therapies per order  - Administer medications as ordered  - Instruct and encourage patient and family to use good hand hygiene technique  - Identify and instruct in appropriate isolation precautions for identified infection/condition  Outcome: Progressing     Problem: SAFETY ADULT  Goal: Patient will remain free of falls  Description: INTERVENTIONS:  - Educate patient/family on patient safety including physical limitations  - Instruct patient to call for assistance with activity   - Consult OT/PT to assist with strengthening/mobility   - Keep Call bell within reach  - Keep bed low and locked with side rails adjusted as appropriate  - Keep care items and personal belongings within reach  - Initiate and maintain comfort rounds  - Make Fall Risk Sign visible to staff  - Offer Toileting every 2 Hours, in advance of need  - Initiate/Maintain bed alarm  - Obtain necessary fall risk management equipment: walker  - Apply yellow socks and bracelet for high fall risk patients  - Consider moving patient to room near nurses station  Outcome: Progressing  Goal: Maintain or return to baseline ADL function  Description: INTERVENTIONS:  -  Assess patient's ability to carry out ADLs; assess patient's baseline for ADL function and identify physical deficits which impact ability to perform ADLs (bathing, care of mouth/teeth, toileting, grooming, dressing, etc )  - Assess/evaluate cause of self-care deficits   - Assess range of motion  - Assess patient's mobility; develop plan if impaired  - Assess patient's need for assistive devices and provide as appropriate  - Encourage maximum independence but intervene and supervise when necessary  - Involve family in performance of ADLs  - Assess for home care needs following discharge   - Consider OT consult to assist with ADL evaluation and planning for discharge  - Provide patient education as appropriate  Outcome: Progressing  Goal: Maintains/Returns to pre admission functional level  Description: INTERVENTIONS:  - Perform BMAT or MOVE assessment daily    - Set and communicate daily mobility goal to care team and patient/family/caregiver  - Collaborate with rehabilitation services on mobility goals if consulted  - Perform Range of Motion 4-6 times a day  - Reposition patient every 2 hours    - Dangle patient 3-4 times a day  - Stand patient 3 times a day  - Ambulate patient 3-4 times a day  - Out of bed for toileting  - Record patient progress and toleration of activity level   Outcome: Progressing     Problem: DISCHARGE PLANNING  Goal: Discharge to home or other facility with appropriate resources  Description: INTERVENTIONS:  - Identify barriers to discharge w/patient and caregiver  - Arrange for needed discharge resources and transportation as appropriate  - Identify discharge learning needs (meds, wound care, etc )  - Arrange for interpretive services to assist at discharge as needed  - Refer to Case Management Department for coordinating discharge planning if the patient needs post-hospital services based on physician/advanced practitioner order or complex needs related to functional status, cognitive ability, or social support system  Outcome: Progressing     Problem: Knowledge Deficit  Goal: Patient/family/caregiver demonstrates understanding of disease process, treatment plan, medications, and discharge instructions  Description: Complete learning assessment and assess knowledge base    Interventions:  - Provide teaching at level of understanding  - Provide teaching via preferred learning methods  Outcome: Progressing     Problem: Potential for Falls  Goal: Patient will remain free of falls  Description: INTERVENTIONS:  - Educate patient/family on patient safety including physical limitations  - Instruct patient to call for assistance with activity   - Consult OT/PT to assist with strengthening/mobility   - Keep Call bell within reach  - Keep bed low and locked with side rails adjusted as appropriate  - Keep care items and personal belongings within reach  - Initiate and maintain comfort rounds  - Make Fall Risk Sign visible to staff  - Offer Toileting every 2 Hours, in advance of need  - Initiate/Maintain bed alarm  - Obtain necessary fall risk management equipment: walker  - Apply yellow socks and bracelet for high fall risk patients  - Consider moving patient to room near nurses station  Outcome: Progressing     Problem: MOBILITY - ADULT  Goal: Maintain or return to baseline ADL function  Description: INTERVENTIONS:  -  Assess patient's ability to carry out ADLs; assess patient's baseline for ADL function and identify physical deficits which impact ability to perform ADLs (bathing, care of mouth/teeth, toileting, grooming, dressing, etc )  - Assess/evaluate cause of self-care deficits   - Assess range of motion  - Assess patient's mobility; develop plan if impaired  - Assess patient's need for assistive devices and provide as appropriate  - Encourage maximum independence but intervene and supervise when necessary  - Involve family in performance of ADLs  - Assess for home care needs following discharge   - Consider OT consult to assist with ADL evaluation and planning for discharge  - Provide patient education as appropriate  Outcome: Progressing  Goal: Maintains/Returns to pre admission functional level  Description: INTERVENTIONS:  - Perform BMAT or MOVE assessment daily    - Set and communicate daily mobility goal to care team and patient/family/caregiver  - Collaborate with rehabilitation services on mobility goals if consulted  - Perform Range of Motion 4-6 times a day  - Reposition patient every 2 hours  - Dangle patient 3-4 times a day  - Stand patient 3 times a day  - Ambulate patient 3-4 times a day  - Out of bed for toileting  - Record patient progress and toleration of activity level   Outcome: Progressing     Problem: Prexisting or High Potential for Compromised Skin Integrity  Goal: Skin integrity is maintained or improved  Description: INTERVENTIONS:  - Identify patients at risk for skin breakdown  - Assess and monitor skin integrity  - Assess and monitor nutrition and hydration status  - Monitor labs   - Assess for incontinence   - Turn and reposition patient  - Assist with mobility/ambulation  - Relieve pressure over bony prominences  - Avoid friction and shearing  - Provide appropriate hygiene as needed including keeping skin clean and dry  - Evaluate need for skin moisturizer/barrier cream  - Collaborate with interdisciplinary team   - Patient/family teaching  - Consider wound care consult   Outcome: Progressing     Problem: Nutrition/Hydration-ADULT  Goal: Nutrient/Hydration intake appropriate for improving, restoring or maintaining nutritional needs  Description: Monitor and assess patient's nutrition/hydration status for malnutrition   Collaborate with interdisciplinary team and initiate plan and interventions as ordered  Monitor patient's weight and dietary intake as ordered or per policy  Utilize nutrition screening tool and intervene as necessary  Determine patient's food preferences and provide high-protein, high-caloric foods as appropriate  INTERVENTIONS:  - Monitor oral intake, urinary output, labs, and treatment plans  - Assess nutrition and hydration status and recommend course of action  - Evaluate amount of meals eaten  - Assist patient with eating if necessary   - Allow adequate time for meals  - Recommend/ encourage appropriate diets, oral nutritional supplements, and vitamin/mineral supplements  - Order, calculate, and assess calorie counts as needed  - Recommend, monitor, and adjust tube feedings and TPN/PPN based on assessed needs  - Assess need for intravenous fluids  - Provide specific nutrition/hydration education as appropriate  - Include patient/family/caregiver in decisions related to nutrition  Outcome: Progressing     Problem: NEUROSENSORY - ADULT  Goal: Achieves maximal functionality and self care  Description: INTERVENTIONS  - Monitor swallowing and airway patency with patient fatigue and changes in neurological status  - Encourage and assist patient to increase activity and self care     - Encourage visually impaired, hearing impaired and aphasic patients to use assistive/communication devices  Outcome: Progressing     Problem: CARDIOVASCULAR - ADULT  Goal: Maintains optimal cardiac output and hemodynamic stability  Description: INTERVENTIONS:  - Monitor I/O, vital signs and rhythm  - Monitor for S/S and trends of decreased cardiac output  - Administer and titrate ordered vasoactive medications to optimize hemodynamic stability  - Assess quality of pulses, skin color and temperature  - Assess for signs of decreased coronary artery perfusion  - Instruct patient to report change in severity of symptoms  Outcome: Progressing     Problem: RESPIRATORY - ADULT  Goal: Achieves optimal ventilation and oxygenation  Description: INTERVENTIONS:  - Assess for changes in respiratory status  - Assess for changes in mentation and behavior  - Position to facilitate oxygenation and minimize respiratory effort  - Oxygen administered by appropriate delivery if ordered  - Initiate smoking cessation education as indicated  - Encourage broncho-pulmonary hygiene including cough, deep breathe, Incentive Spirometry  - Assess the need for suctioning and aspirate as needed  - Assess and instruct to report SOB or any respiratory difficulty  - Respiratory Therapy support as indicated  Outcome: Progressing     Problem: GASTROINTESTINAL - ADULT  Goal: Maintains adequate nutritional intake  Description: INTERVENTIONS:  - Monitor percentage of each meal consumed  - Identify factors contributing to decreased intake, treat as appropriate  - Assist with meals as needed  - Monitor I&O, weight, and lab values if indicated  - Obtain nutrition services referral as needed  Outcome: Progressing     Problem: METABOLIC, FLUID AND ELECTROLYTES - ADULT  Goal: Electrolytes maintained within normal limits  Description: INTERVENTIONS:  - Monitor labs and assess patient for signs and symptoms of electrolyte imbalances  - Administer electrolyte replacement as ordered  - Monitor response to electrolyte replacements, including repeat lab results as appropriate  - Instruct patient on fluid and nutrition as appropriate  Outcome: Progressing  Goal: Fluid balance maintained  Description: INTERVENTIONS:  - Monitor labs   - Monitor I/O and WT  - Instruct patient on fluid and nutrition as appropriate  - Assess for signs & symptoms of volume excess or deficit  Outcome: Progressing     Problem: SKIN/TISSUE INTEGRITY - ADULT  Goal: Skin Integrity remains intact(Skin Breakdown Prevention)  Description: Assess:  -Perform Erickson assessment every shift  -Clean and moisturize skin every day  -Inspect skin when repositioning, toileting, and assisting with ADLS  -Assess extremities for adequate circulation and sensation     Bed Management:  -Have minimal linens on bed & keep smooth, unwrinkled  -Change linens as needed when moist or perspiring  -Avoid sitting or lying in one position for more than 2 hours while in bed  -Keep HOB at 45 degrees     Toileting:  -Offer bedside commode  -Assess for incontinence every 2 hours  -Use incontinent care products after each incontinent episode such as brief    Activity:  -Mobilize patient 3-4 times a day  -Encourage activity and walks on unit  -Encourage or provide ROM exercises   -Turn and reposition patient every 2 Hours  -Use appropriate equipment to lift or move patient in bed  -Instruct/ Assist with weight shifting every 2 when out of bed in chair  -Consider limitation of chair time 2 hour intervals    Skin Care:  -Avoid use of baby powder, tape, friction and shearing, hot water or constrictive clothing  -Relieve pressure over bony prominences using pillows  -Do not massage red bony areas    Next Steps:  -Teach patient strategies to minimize risks such as walker  -Consider consults to  interdisciplinary teams such as wound  Outcome: Progressing  Goal: Incision(s), wounds(s) or drain site(s) healing without S/S of infection  Description: INTERVENTIONS  - Assess and document dressing, incision, wound bed, drain sites and surrounding tissue  - Provide patient and family education  - Perform skin care/dressing changes every PRN  Outcome: Progressing     Problem: MUSCULOSKELETAL - ADULT  Goal: Maintain or return mobility to safest level of function  Description: INTERVENTIONS:  - Assess patient's ability to carry out ADLs; assess patient's baseline for ADL function and identify physical deficits which impact ability to perform ADLs (bathing, care of mouth/teeth, toileting, grooming, dressing, etc )  - Assess/evaluate cause of self-care deficits   - Assess range of motion  - Assess patient's mobility  - Assess patient's need for assistive devices and provide as appropriate  - Encourage maximum independence but intervene and supervise when necessary  - Involve family in performance of ADLs  - Assess for home care needs following discharge   - Consider OT consult to assist with ADL evaluation and planning for discharge  - Provide patient education as appropriate  Outcome: Progressing

## 2022-10-07 NOTE — PROGRESS NOTES
Progress Note - Infectious Disease   Larry Powell 45 y o  female MRN: [id number]  Unit/Bed#: [place] [address] -01 Encounter: [id number]      Impression/Plan:  1  SIRS vs Sepsis, not present on admission, a/e/b fever, tachycardia  Mild leukocytosis   Suspect this is likely due to Gram-positive bacteremia   No recurrent high fever spike  No lactic acidosis   COVID-19 negative   UA benign   Status post lumbar puncture for altered mental status with negative ME panel   Clinically much improved   -antibiotics as below   -follow-up cultures and adjust antibiotics as needed  -monitor temperature and hemodynamics  -recheck BMP     2  MSSA bacteremia  2 of 2 admission blood cultures postive for MSSA  Patient was found down for prolonged period of time with evidence of multiple wounds on admission  Suspect cutaneous vs endovascular source, likely phlebitis as there is report of RUE erythema surrounding prior IV in addition to palpable cord in left antecub  TTE negative for obvious vegetation  Repeat blood cultures x 2 sets are negative at 24h   CHERYLE planned today now on hold due to tenuous respiratory status  Repeat transthoracic echocardiogram limited but negative for valvular vegetation  -continue cefazolin  -recheck transthoracic echocardiogram  -recommend CHERYLE if able to safely do  -plan 2-4 week course of IV antibiotics from the 1st negative blood culture  depending on whether adequate views of the valves can be seen on transthoracic or if transesophageal echocardiogram done      3  Toxic Metabolic encephalopathy   Likely multifactorial etiology with uremia playing a role   This was initially thought to be secondary to gabapentin and morphine use in the setting of renal failure, hypotension, rhabdomyolysis   MRI brain negative   Lumbar puncture with opening pressure 26 with negative ME panel  CSF fluid culture not collected   The most likely to be secondary to metabolic issues, medication effect, and possible Wernicke's encephalopathy   Psychiatric issues also likely playing a role  -monitor cognition  -treat metabolic issues and psychiatric issues  -no additional ID workup needed     4  Acute renal failure   Creatinine on admission 5 88 with CK over 32,000  Creatinine reached plateau, now down [id number] 9 8 today   Likely multifactorial etiology in the setting of rhabdomyolysis and dehydration  Likely ANUJA/ATN    The renal function is slowly improving    -renally dose antibiotics as above  -once creatinine clearance increases to above 35 will increase the cefazolin to q 8 hours dosing  -close Nephrology follow-up  -recheck BMP     5  Tobacco abuse   Encourage cessation as recommended by primary team      6  Antibiotic allergy  Hx of hives with PCNs  Tolerated ceftriaxone and now tolerating cefazolin  Monitor for MUSA      7  Acute hypoxic respiratory failure- CT scan suggest new areas of ground-glass opacification of unclear significance   Possible aspiration pneumonitis versus pneumonia   Procalcitonin level is relatively low   No longer requiring oxygen support  Chest x-ray now clear  -monitor respiratory status     8  Diarrhea-possibly all antibiotic related   Stool for C diff is negative   Started on probiotic   -no additional oral vancomycin  -monitor stool output  -symptomatic treatment    Discussed the above management plan with the primary service    Antibiotics:  Cefazolin 11  Negative blood cultures 9    Subjective:  Patient has no fever, chills, sweats; no nausea, vomiting, diarrhea; no cough, shortness of breath; no pain  No new symptoms  She remains confused but is very alert interactive      Objective:  Vitals:  Temp:  [97 6 °F (36 4 °C)-98 2 °F (36 8 °C)] 97 6 °F (36 4 °C)  HR:  [] 91  Resp:  [16-21] 21  BP: (118-141)/(78-86) 138/86  SpO2:  [92 %-99 %] 92 %  Temp (24hrs), Av 9 °F (36 6 °C), Min:97 6 °F (36 4 °C), Max:98 2 °F (36 8 °C)  Current: Temperature: 97 6 °F (36 4 °C)    Physical Exam:   General Appearance:  Alert, interactive, confused, nontoxic, no acute distress  Throat: Oropharynx moist without lesions  Lungs:   Clear to auscultation bilaterally; no wheezes, rhonchi or rales; respirations unlabored   Heart:  RRR; no murmur, rub or gallop   Abdomen:   Soft, non-tender, non-distended, positive bowel sounds  Extremities: No clubbing, cyanosis or edema   Skin: No new rashes or lesions  No draining wounds noted  Labs, Imaging, & Other studies:   All pertinent labs and imaging studies were personally reviewed  Results from last 7 days   Lab Units 10/07/22  0834 10/06/22  0622 10/05/22  0449   WBC Thousand/uL 13 61* 14 36* 11 04*   HEMOGLOBIN g/dL 11 0* 10 4* 9 9*   PLATELETS Thousands/uL 299 318 326     Results from last 7 days   Lab Units 10/06/22  0622 10/05/22  2618 10/05/22  0449 10/04/22  0444 10/02/22  0505 10/01/22  0507 10/01/22  0145   SODIUM mmol/L 140 144 138 146   < > 144 146   POTASSIUM mmol/L 3 9 3 2* 3 3* 2 9*   < > 3 2* 3 1*   CHLORIDE mmol/L 104 105 107 102   < > 102 104   CO2 mmol/L 29 32 26 37*   < > 33* 34*   BUN mg/dL 24 30* 8 34*   < > 39* 41*   CREATININE mg/dL 2 03* 2 55* 0 79 3 07*   < > 5 78* 5 88*   EGFR ml/min/1 73sq m 27 20 85 16   < > 7 7   CALCIUM mg/dL 10 9* 9 9 9 0 9 7   < > 9 0 9 3   AST U/L  --   --   --  30  --  56* 66*   ALT U/L  --   --   --  18  --  47 45   ALK PHOS U/L  --   --   --  78  --  76 91    < > = values in this interval not displayed       Results from last 7 days   Lab Units 10/03/22  1823   C DIFF TOXIN B BY PCR  Negative     Results from last 7 days   Lab Units 10/04/22  0444   PROCALCITONIN ng/ml 0 26*

## 2022-10-07 NOTE — CONSULTS
TeleConsultation - [facility] 45 y o  female MRN: [id number]  Unit/Bed#: [place] [address] -01 Encounter: [id number]        REQUIRED DOCUMENTATION:     1  This service was provided via Telemedicine  2  Provider located at [facility]   3  TeleMed provider: Isa Post MD   4  Identify all parties in room with patient during tele consult: Patient   5  Patient was then informed that this was a Telemedicine visit and that the exam was being conducted confidentially over secure lines  My office door was closed  No one else was in the room  Patient acknowledged consent and understanding of privacy and security of the Telemedicine visit, and gave us permission to have the assistant stay in the room in order to assist with the history and to conduct the exam   I informed the patient that I have reviewed their record in Epic and presented the opportunity for them to ask any questions regarding the visit today  The patient agreed to participate  Discussed with Marlena RODRIGUEZ     Assessment/Plan     Principal Problem:    Toxic metabolic encephalopathy  Active Problems:    Depression    Tobacco abuse    DDD (degenerative disc disease), lumbosacral    ARF (acute renal failure) (HCC)    Transaminitis    Abnormal CT of the chest    Traumatic rhabdomyolysis (HCC)    D-dimer, elevated    Leg edema, right    Localized swelling of right upper extremity    Bacteremia due to methicillin susceptible Staphylococcus aureus (MSSA)    Pulmonary edema    Aspiration pneumonitis (HCC)    Hypokalemia    Hypernatremia    Diarrhea    Assessment:  Eliz Babin is a 44 y/o female with PMH significant for IBS, HTN, COPD, MDD,and  ALVIN that presented for AMS  All prior consults reviewed  Patient with continued AMS that has not markedly improved with improvements in Rhabdomyolysis and associated Acute Renal Failure   Patient could have some component of hospital delirium but continues to have medical reasons for Delirium/Metabolic Encephalopathy and recommend to continue to evaluate for improvement with continued antibiotic treatment and further improvement in renal function  Patient with no indication for voluntary or involuntary inpatient psychiatric admission          Recommend Delirium Precautions  Maintain sleep-wake cycle, avoid nighttime interruptions  Provide adequate pain control  Avoid urinary retention and constipation  Provide frequent and early mobilization  Provide frequent redirection and reorientation as needed  Avoid medications that may worsen or precipitate delirium such as tramadol, benzodiazepines, anticholinergics, and Benadryl  Redirect unwanted behaviors as first-line therapy, avoid physical restraints as able to  Continue to monitor    Deliruim     Treatment Plan:    Planned Medication Changes:    -None    Current Medications:     Current Facility-Administered Medications   Medication Dose Route Frequency Provider Last Rate   • acetaminophen  650 mg Oral Q6H PRN Yoshi Ramos DO     • ALPRAZolam  0 5 mg Oral HS PRN Lindsay Hastings MD     • amLODIPine  10 mg Oral Daily Tiffany Medina MD     • cefazolin  2,000 mg Intravenous Q12H Mallorie Abbott MD Stopped (10/07/22 1530)   • gabapentin  100 mg Oral TID Lindsay Hastings MD     • heparin (porcine)  5,000 Units Subcutaneous Q8H [address] Atul Adan, DO     • hydrALAZINE  5 mg Intravenous Q6H PRN Víctor Santiago DO     • HYDROmorphone  0 5 mg Intravenous Q4H PRN Yoshi Ramos DO     • levalbuterol  1 25 mg Nebulization Q4H PRN Sam Arellano, DO     • loperamide  2 mg Oral 4x Daily PRN Lindsay Hastings MD     • metoprolol tartrate  25 mg Oral Q12H [address] Inder Canas MD     • VANDANA ANTIFUNGAL   Topical BID Sheralyn Vignesh Arellano, DO     • nicotine  1 patch Transdermal Daily Atul Adan DO     • OLANZapine  5 mg Oral Q6H PRN Víctor Santiago DO     • ondansetron  4 mg Intravenous Q4H PRN Yoshi Ramos DO     • QUEtiapine  50 mg Oral BID Inder Chong Cayetano Nixon MD     • saccharomyces boulardii  250 mg Oral BID Krunal Chavez MD         Risks / Benefits of Treatment:    Risks, benefits, and possible side effects of medications explained to patient and patient verbalizes understanding  Inpatient consult to Psychiatry  Consult performed by: Charlette Noe MD  Consult ordered by: Krunal Chavez MD        Physician Requesting Consult: Krunal Chavez MD  Principal Problem:Toxic metabolic encephalopathy    Reason for Consult: AMS      History of Present Illness      Per ED Note by Reagan RODRIGUEZ O  52-year-old female with a past medical history of IBS, hypertension, hyperlipidemia, depression, COPD, anxiety, GERD, and opioid abuse presents with altered mental status   called EMS because he found patient confused this morning  Apparently, she had a fall and hit her head 4 days ago  Patient had been acting normally afterwards and was not evaluated  Patient is oriented to person and place, but cannot answer any other questions  She will answer yes and no to some questions  Patient denies chest pain and shortness of breath  She states that she is having pain in her right ankle, which is swollen  She states that this swelling is new  Patient denies taking any recreational drugs  She denies taking more than her prescribed doses of her home medications  Patient is an extremely poor historian due to altered mental status majority of information obtained from chart review and clinical observation       Psychiatric Review Of Systems:    sleep: yes  appetite changes: no  weight changes: no  energy/anergy: no  interest/pleasure/anhedonia: no  somatic symptoms: no  anxiety/panic: no  terri: no  guilty/hopeless: no  self injurious behavior/risky behavior: no    Historical Information     Past Psychiatric History:     Psychiatric Hospitalizations:   • Unclear history of past psychiatric admissions  Outpatient Treatment History:   • Unable to assess  Suicide Attempts:   • Unable to evaluate  History of self-harm:   • None  Violence History:   • no  Past Psychiatric medication trials: Yes    Substance Abuse History: Unable to evaluate  Use of Alcohol: Unable to evaluate    Longest clean time: Unable to evaluate  History of IP/OP rehabilitation program: Unable to evaluate  Smoking history: Unable to evaluate  Use of Caffeine: Unable to evaluate    Family Psychiatric History: Unable to evaluate      Social History:    Education: high school diploma/GED  Learning Disabilities: Unable to evaluate  Marital history:   Living arrangement, social support: The patient lives in home with   1   Occupational History: Unable to evaluate  Functioning Relationships: good support system    Other Pertinent History: None    Traumatic History:     Abuse: Unable to evaluate  Other Traumatic Events: none    Past Medical History:   Diagnosis Date   • Chronic pain disorder    • Depression    • GERD (gastroesophageal reflux disease)    • History of electroconvulsive therapy    • Low back pain    • Self-injurious behavior    • Suicide attempt [facility])        Medical Review Of Systems:    Review of Systems    Meds/Allergies     all current active meds have been reviewed  Allergies   Allergen Reactions   • Chantix [Varenicline]    • Ibuprofen Other (See Comments)     Upset stomach   • Lyrica [Pregabalin] Other (See Comments)     bruising   • Penicillins Other (See Comments)     ? hives   • Sulfa Antibiotics Other (See Comments)     sloughing skin in mouth   • Sulfasalazine        Objective     Vital signs in last 24 hours:  Temp:  [97 6 °F (36 4 °C)-97 8 °F (36 6 °C)] 97 6 °F (36 4 °C)  HR:  [] 91  Resp:  [17-21] 17  BP: (114-138)/(78-86) 114/78    No intake or output data in the 24 hours ending 10/07/22 1905    Mental Status Evaluation:    Appearance:  age appropriate   Behavior:  restless and fidgety   Speech:  normal pitch and normal volume   Mood:  euthymic   Affect:  increased in range   Language: naming objects   Thought Process:  tangential   Associations intact associations   Thought Content:  normal   Perceptual Disturbances: None   Risk Potential: Suicidal Ideations none  Homicidal Ideations none  Potential for Aggression No   Sensorium:  person   Cognition:  Grossly altered   Consciousness:  alert    Attention: attention span and concentration were age appropriate   Intellect: within normal limits   Fund of Knowledge: awareness of current events: President   Insight:  limited   Judgment: limited   Muscle Strength:  Muscle Tone: normal NFT  normal   Gait/Station: normal gait/station   Motor Activity: no abnormal movements       Lab Results: I have personally reviewed all pertinent laboratory/tests results  Most Recent Labs:   Lab Results   Component Value Date    WBC 13 61 (H) 10/07/2022    RBC 3 42 (L) 10/07/2022    HGB 11 0 (L) 10/07/2022    HCT 34 0 (L) 10/07/2022     10/07/2022    RDW 14 0 10/07/2022    NEUTROABS 10 16 (H) 10/07/2022    SODIUM 139 10/07/2022    K 4 3 10/07/2022     10/07/2022    CO2 28 10/07/2022    BUN 24 10/07/2022    CREATININE 1 83 (H) 10/07/2022    GLUC 114 10/07/2022    GLUF 106 (H) 05/27/2022    CALCIUM 10 2 (H) 10/07/2022    AST 30 10/04/2022    ALT 18 10/04/2022    ALKPHOS 78 10/04/2022    TP 6 7 10/04/2022    ALB 2 5 (L) 10/04/2022    TBILI 0 36 10/04/2022    CHOLESTEROL 204 (H) 05/27/2022    HDL 85 05/27/2022    TRIG 276 (H) 05/27/2022    LDLCALC 64 05/27/2022    NONHDLC 147 05/24/2018    LITHIUM 0 6 05/22/2018    AMMONIA 33 09/19/2022    LMH5JLXVAQTU 3 047 09/19/2022    FREET4 0 78 04/16/2018    PREGSERUM Negative 05/24/2018       Imaging Studies: CT chest abdomen pelvis wo contrast    Result Date: 9/19/2022  Narrative: CT CHEST, ABDOMEN AND PELVIS WITHOUT IV CONTRAST INDICATION:   ARF, liver injury  COMPARISON:  None   TECHNIQUE: CT examination of the chest, abdomen and pelvis was performed without intravenous contrast  Axial, sagittal, and coronal 2D reformatted images were created from the source data and submitted for interpretation  Radiation dose length product (DLP) for this visit:  563 mGy-cm   This examination, like all CT scans performed in the [facility], was performed utilizing techniques to minimize radiation dose exposure, including the use of iterative reconstruction and automated exposure control  Enteric contrast was administered  FINDINGS: CHEST LUNGS:  Patchy areas of groundglass in the left upper and superior segment of the left lower lobe and right upper lobe with associated inter and intralobular septal thickening  Lungs otherwise are clear  Central airways patent  PLEURA:  Unremarkable  HEART/GREAT VESSELS: Heart is unremarkable for patient's age  No thoracic aortic aneurysm  MEDIASTINUM AND GAYLE:  Unremarkable  CHEST WALL AND LOWER NECK:  Unremarkable  ABDOMEN LIVER/BILIARY TREE:  Unremarkable  GALLBLADDER:  No calcified gallstones  No pericholecystic inflammatory change  SPLEEN:  Unremarkable  PANCREAS:  Unremarkable  ADRENAL GLANDS:  Unremarkable  KIDNEYS/URETERS:  Mild nonspecific bilateral perinephric fat stranding  No hydronephrosis or renal calculi  STOMACH AND BOWEL:  Unremarkable  APPENDIX:  No findings to suggest appendicitis  ABDOMINOPELVIC CAVITY:  No ascites  No pneumoperitoneum  No lymphadenopathy  VESSELS:  Unremarkable for patient's age  PELVIS REPRODUCTIVE ORGANS:  Unremarkable for patient's age  URINARY BLADDER:  Unremarkable  ABDOMINAL WALL/INGUINAL REGIONS:  Unremarkable  OSSEOUS STRUCTURES:  No acute fracture or destructive osseous lesion  Multiple healed right-sided rib fractures  Impression: Bilateral groundglass opacities with superimposed inter and intralobular septal thickening  Differential diagnosis includes pulmonary hemorrhage, infection, and pulmonary edema, although the distribution is atypical for edema   The study was marked in [facility] for immediate notification  Workstation performed: [id number]     XR chest portable    Result Date: 10/5/2022  Narrative: CHEST INDICATION:   pleural effusion  COMPARISON:  CXR and chest CT 10/1/2022 and 09/30/2022  EXAM PERFORMED/VIEWS:  XR CHEST PORTABLE FINDINGS: Cardiomediastinal silhouette appears unremarkable  Resolution of pulmonary edema  No definite effusion  No pneumothorax  Old right rib fractures  Mild benign eventration of the right hemidiaphragm  Impression: Resolution of pulmonary edema with no definite effusion but effusions can be obscured on portable chest radiographs  Workstation performed: [id number]     XR chest portable    Result Date: 10/1/2022  Narrative: CHEST INDICATION:   tachypnea  COMPARISON:  9/30/2022  EXAM PERFORMED/VIEWS:  XR CHEST PORTABLE Images:  1 FINDINGS:  Feeding tube partially seen  Cardiomediastinal silhouette appears unremarkable  Persistent extensive patchy bilateral interstitial and alveolar consolidation perhaps slightly increased  Suspected small right pleural effusion  No pneumothorax  Osseous structures appear within normal limits for patient age  Impression: Persistent extensive patchy bilateral interstitial alveolar consolidation likely slightly increased  Small right pleural effusion  Feeding tube  Workstation performed: [id number]     XR chest portable    Result Date: 9/30/2022  Narrative: CHEST INDICATION:   keofed placement  COMPARISON:  Portable semiupright chest radiograph September 29, 2022  EXAM PERFORMED/VIEWS:  XR CHEST PORTABLE FINDINGS:  Metallic tip of feeding tube is in the mid to distal esophagus  Cardiomediastinal silhouette appears unremarkable  Extensive bilateral pulmonary opacities are similar to the comparison study when accounting for differences in technique  No pneumothorax or pleural effusion  Osseous structures appear within normal limits for patient age  Impression: 1  Feeding tube mid to distal esophagus    Patient has a subsequent follow-up abdominal image  2   Bilateral pulmonary opacities consistent with pneumonia or pulmonary edema unchanged since comparison  Workstation performed: [id number]     XR chest portable    Result Date: 9/30/2022  Narrative: CHEST INDICATION:   hypoxia  COMPARISON:  Chest x-ray from 9/19/2022 EXAM PERFORMED/VIEWS:  XR CHEST PORTABLE FINDINGS: Cardiomediastinal silhouette appears unremarkable  There is new bilateral central airspace opacities, left greater than right, which could be pulmonary edema or pneumonia  No pneumothorax or pleural effusion  Osseous structures appear within normal limits for patient age  Impression: There is new bilateral central airspace opacities, left greater than right, which could be pulmonary edema or pneumonia  Workstation performed: [id number]     XR hip/pelv 2-3 vws right    Result Date: 9/19/2022  Narrative: RIGHT HIP INDICATION:   fall  COMPARISON:  None VIEWS:  XR HIP/PELV 2-3 VWS RIGHT W PELVIS IF PERFORMED Images: 4 FINDINGS: There is no acute fracture or dislocation  No significant hip degenerative changes  No lytic or blastic osseous lesion  Soft tissues are unremarkable  The visualized lumbar spine is unremarkable  Impression: No acute osseous abnormality  Workstation performed: [id number]     XR ankle 3+ views RIGHT    Result Date: 9/19/2022  Narrative: RIGHT ANKLE INDICATION:   fall  COMPARISON:  None VIEWS:  XR ANKLE 3+ VW RIGHT Images: 3 FINDINGS: There is no acute fracture or dislocation  No significant degenerative changes  No lytic or blastic osseous lesion  Soft tissues are unremarkable  Impression: No acute osseous abnormality  Workstation performed: [id number]     XR foot 3+ views RIGHT    Result Date: 9/19/2022  Narrative: RIGHT FOOT INDICATION:   fall  COMPARISON:  None VIEWS:  XR FOOT 3+ VW RIGHT Images: 3 FINDINGS: There is no acute fracture or dislocation  No significant degenerative changes  No lytic or blastic osseous lesion   Soft tissues are unremarkable  Impression: No acute osseous abnormality  Workstation performed: [id number]     XR abdomen 1 view kub    Result Date: 10/1/2022  Narrative: ABDOMEN INDICATION:   Keofed placement  COMPARISON:  9/30/2022 at 3:34 PM  VIEWS:  AP supine FINDINGS: Feeding tube has been placed with tip within the proximal stomach on final image  There is a nonobstructive bowel gas pattern  No discernible free air on this supine study  Upright or left lateral decubitus imaging is more sensitive to detect subtle free air in the appropriate setting  No pathologic calcifications or soft tissue masses  Visualized lung bases are clear  Visualized osseous structures are unremarkable for the patient's age  Impression: Feeding tube tip within the proximal stomach  Workstation performed: [id number]     XR abdomen 1 view kub    Result Date: 10/1/2022  Narrative: ABDOMEN INDICATION:   Keofed placement  COMPARISON:  9/30/2022 at 3:34 PM  VIEWS:  AP supine FINDINGS: Feeding tube has been placed with tip within the proximal stomach on final image  There is a nonobstructive bowel gas pattern  No discernible free air on this supine study  Upright or left lateral decubitus imaging is more sensitive to detect subtle free air in the appropriate setting  No pathologic calcifications or soft tissue masses  Visualized lung bases are clear  Visualized osseous structures are unremarkable for the patient's age  Impression: Feeding tube tip within the proximal stomach  Workstation performed: [id number]     XR abdomen 1 view kub    Result Date: 9/30/2022  Narrative: ABDOMEN INDICATION:   keofed placement  COMPARISON:  Abdominal radiographs May 30, 2019 and immediately preceding chest radiograph  VIEWS:  AP supine FINDINGS: Feeding tube has been advanced to terminate in the expected location of the stomach since the earlier chest radiograph  There is a nonobstructive bowel gas pattern  No discernible free air on this supine study    Upright or left lateral decubitus imaging is more sensitive to detect subtle free air in the appropriate setting  No pathologic calcifications or soft tissue masses  Radiopacities noted as described separately on chest radiograph  Progression of scoliosis and degenerative disc disease since the comparison abdominal views  Impression: Advancement of feeding tube into the stomach with no acute findings  Workstation performed: [id number]     CT head without contrast    Result Date: 9/19/2022  Narrative: CT BRAIN - WITHOUT CONTRAST INDICATION:   fall, ams  COMPARISON:  None  TECHNIQUE:  CT examination of the brain was performed  In addition to axial images, sagittal and coronal 2D reformatted images were created and submitted for interpretation  Radiation dose length product (DLP) for this visit:  868 mGy-cm   This examination, like all CT scans performed in the [facility], was performed utilizing techniques to minimize radiation dose exposure, including the use of iterative reconstruction and automated exposure control  IMAGE QUALITY:  Diagnostic  FINDINGS: PARENCHYMA:  No intracranial mass, mass effect or midline shift  No CT signs of acute infarction  No acute parenchymal hemorrhage  Chronic lacunar infarct right basal ganglia  Minimal periventricular white matter hypodensity  Calcification bilateral cavernous internal carotid arteries  VENTRICLES AND EXTRA-AXIAL SPACES:  Normal for the patient's age  VISUALIZED ORBITS AND PARANASAL SINUSES:  Trace mucosal thickening right maxillary sinus  Orbits unremarkable  CALVARIUM AND EXTRACRANIAL SOFT TISSUES:  Normal      Impression: No acute intracranial process  No skull fracture  Workstation performed: [id number]     CT chest wo contrast    Result Date: 10/1/2022  Narrative: CT CHEST WITHOUT IV CONTRAST INDICATION:   Abnormal xray - lung opacity/opacities shortness of breath, pulmonary edema vs pneumonia  Patient has suspected COVID-19   COMPARISON: Same day chest radiograph  CT chest abdomen pelvis without contrast September 19, 2022  TECHNIQUE: CT examination of the chest was performed without intravenous contrast  Axial, sagittal, and coronal 2D reformatted images were created from the source data and submitted for interpretation  Radiation dose length product (DLP) for this visit:  210 mGy-cm   This examination, like all CT scans performed in the [facility], was performed utilizing techniques to minimize radiation dose exposure, including the use of iterative reconstruction and automated exposure control  FINDINGS: LUNGS:  There is no tracheal or endobronchial lesion  Patchy diffuse ground glass opacities in bilateral upper, right middle and left lower lobe with intralobular septal thickening  New consolidative opacity with air bronchograms in right lower lobe  Worsened bilateral lower lobe subsegmental atelectasis (right worse than left)  PLEURA:  Small-to-moderate bilateral pleural effusions (right worse than left), new from prior exam  HEART/GREAT VESSELS: Heart is unremarkable for patient's age  No thoracic aortic aneurysm  MEDIASTINUM AND GAYLE:  Unremarkable  CHEST WALL AND LOWER NECK:  Mild-to-moderate wall edema  VISUALIZED STRUCTURES IN THE UPPER ABDOMEN:  Enteric tube courses into the stomach  Otherwise, upper abdomen is unremarkable  OSSEOUS STRUCTURES:  No acute fracture or destructive osseous lesion  Unchanged chronic right rib fracture deformities  Mild multilevel spinal degenerative changes  Impression: Persistent multifocal pulmonary opacities with new consolidative opacity in right lower lobe, suspicious for combination of multifocal pneumonia and pulmonary edema  New small-to-moderate bilateral pleural effusions (right worse than left), worsened bilateral lower lobe subsegmental atelectasis (right worse than left), and new mild-to-moderate body wall edema  The study was marked in [facility] for immediate notification  Workstation performed: [id number]     CT spine cervical without contrast    Result Date: 9/19/2022  Narrative: CT CERVICAL SPINE - WITHOUT CONTRAST INDICATION:   fall  COMPARISON:  Spine radiographs 4/21/2022 TECHNIQUE:  CT examination of the cervical spine was performed without intravenous contrast   Contiguous axial images were obtained  Sagittal and coronal reconstructions were performed  Radiation dose length product (DLP) for this visit:  365 mGy-cm   This examination, like all CT scans performed in the [facility], was performed utilizing techniques to minimize radiation dose exposure, including the use of iterative reconstruction and automated exposure control  IMAGE QUALITY:  Diagnostic  FINDINGS: ALIGNMENT:  No acute posttraumatic malalignment  Stable mild reversal of the usual cervical lordosis and minimal grade 1 degenerative anterolisthesis of C3 on C4 and C4 on C5  VERTEBRAL BODIES:  No fracture  DEGENERATIVE CHANGES:  Moderate left C3-4 and right C4-5 facet degenerative changes  Mild multilevel cervical degenerative changes elsewhere without critical central canal stenosis  PREVERTEBRAL AND PARASPINAL SOFT TISSUES:  No prevertebral soft tissue swelling  Right carotid artery calcification  THORACIC INLET:  Trace biapical emphysematous changes, right greater than left  Partially visualized subpleural groundglass opacity in the left pulmonary apex presumably scarring  Impression: No cervical spine fracture or traumatic malalignment  Incidental finding of partially visualized subpleural groundglass opacity in the left pulmonary apex presumably scarring  Cannot exclude infiltrate  This study demonstrates a significant  finding and was documented as such in [facility] for liaison and referring practitioner notification   Workstation performed: [id number]     MRI brain wo contrast    Result Date: 9/23/2022  Narrative: MRI BRAIN WITHOUT CONTRAST-limited INDICATION: Persistent change in mental status  COMPARISON:   None  TECHNIQUE:  Sagittal T1, axial FLAIR, and axial diffusion imaging  IMAGE QUALITY:  Severely limited by patient motion artifact and Limited pulse sequences performed FINDINGS: BRAIN PARENCHYMA:  No evidence of recent infarct  No mass effect or midline shift  No gross hemorrhage  VENTRICLES:  No evidence of hydrocephalus SELLA AND PITUITARY GLAND:  Suboptimally evaluated ORBITS:  Suboptimally evaluated PARANASAL SINUSES:  Suboptimally evaluated VASCULATURE:  Suboptimally evaluated CALVARIUM AND SKULL BASE:  Suboptimally evaluated EXTRACRANIAL SOFT TISSUES:  Suboptimally evaluated     Impression: No evidence of recent infarct  No mass effect or midline shift  Study performed in the limited fashion with extensive motion artifact  Workstation performed: [id number]     XR chest 1 view    Result Date: 9/19/2022  Narrative: CHEST INDICATION:   ams  COMPARISON:  10/28/2018 EXAM PERFORMED/VIEWS:  XR CHEST 1 VIEW Images: 3 FINDINGS: Cardiomediastinal silhouette appears unremarkable  The lungs are clear  No pneumothorax or pleural effusion  Old healed right-sided rib fracture deformities again evident     Impression: No acute cardiopulmonary disease  Findings are stable Workstation performed: [id number]     VAS lower limb venous duplex study, unilateral/limited    Result Date: 9/25/2022  Narrative:  [facility] REPORT CLINICAL: Indications: Patient presents with persistent right lower extremity edema  Prior study on 09-19-22 was negative for DVT and Physician wants repeat duplex and also to r/o iliac vein and IVC thrombus  Risk Factors The patient has history of HTN, Hyperlipidemia and smoking (current) 0 5 ppd  CONCLUSION: Impression: RIGHT LOWER LIMB No evidence of acute or chronic deep vein thrombosis   No evidence of superficial thrombophlebitis noted  Doppler evaluation shows a normal response to augmentation maneuvers   Popliteal, posterior tibial and anterior tibial arterial Doppler waveforms are triphasic  LEFT LOWER LIMB LIMITED Evaluation shows no evidence of thrombus in the common femoral vein  Doppler evaluation shows a normal response to augmentation maneuvers  Note: There is no evidence of DVT in the inferior vena cava and b/l common and external iliac veins  Technical findings were given to [facility]  SIGNATURE: Electronically Signed by: Mima Pan MD, RPVI on 2022-09-25 10:24:18 PM    VAS lower limb venous duplex study, unilateral/limited    Result Date: 9/19/2022  Narrative:  [facility] REPORT CLINICAL: Indications: Patient presents with right lower extremity edema  Risk Factors The patient has history of HTN, Hyperlipidemia and smoking (current) 0 5 ppd  CONCLUSION: Impression: RIGHT LOWER LIMB No evidence of acute or chronic deep vein thrombosis   No evidence of superficial thrombophlebitis noted  Doppler evaluation shows a normal response to augmentation maneuvers  Popliteal, posterior tibial and anterior tibial arterial Doppler waveforms are monophasic  LEFT LOWER LIMB LIMITED Unable to obtain comparison due to patient cooperation  TECH NOTE: Limited visualization due to patient cooperation  SIGNATURE: Electronically Signed by: Mima Pan MD, [id number]  on 2022-09-19 10:04:30 PM    US right upper quadrant with liver dopplers    Result Date: 9/20/2022  Narrative: RIGHT UPPER QUADRANT ULTRASOUND WITH LIVER DOPPLER INDICATION:     elevated LFTs  COMPARISON:  None  TECHNIQUE:   Real-time ultrasound of the right upper quadrant was performed with a curvilinear transducer with both volumetric sweeps and still imaging techniques  FINDINGS: PANCREAS:  Portions of the pancreas are obscured by bowel gas  Visualized portions of the pancreas are unremarkable  AORTA AND IVC:  Visualized portions are normal for patient age  LIVER: Size:  Within normal range  The liver measures 15 4 cm in the midclavicular line  Contour:  Surface contour is smooth   Parenchyma: Echogenicity and echotexture are within normal limits  No liver mass identified  LIVER DOPPLER: The main portal vein and primary branch segments are patent and hepatopetal with normal spectral waveform  Hepatic veins are patent  Spectral waveforms within normal limits  Main hepatic artery appears normal size, patent with normal spectral waveform  BILIARY: The gallbladder is normal in caliber  No wall thickening or pericholecystic fluid  There is minimal layering sludge  There are no stones  No sonographic Bustos's sign  No intrahepatic biliary dilatation  CBD measures 3 0 mm  No choledocholithiasis  KIDNEY: Right kidney measures 12 0 x 6 1 x 4 9 cm  Volume 184 8 mL Kidney within normal limits  ASCITES:   None  Impression: 1  Minimal layering sludge without evidence of acute cholecystitis  2   Normal-appearing liver with normal liver Dopplers  Workstation performed: [id number]     CT lower extremity wo contrast right    Result Date: 9/19/2022  Narrative: CT tibia-fibula without IV contrast INDICATION: Knee pain, chronic, positive xray (Age >= 5y) right calf tenderness; rhabdo eval compartments if possible  Dr Phillip rPuett note from 9/19/2022 was reviewed  Patient has traumatic rhabdomyolysis due to fall on Friday and has tense right lower extremity  There is clinical concern for compartment injury  COMPARISON: None  TECHNIQUE: CT examination of the above was performed  This examination, like all CT scans performed in the [facility], was performed utilizing techniques to minimize radiation dose exposure, including the use of iterative reconstruction and automated exposure control software  Sagittal and coronal two dimensional reconstructed images were also submitted for interpretation  Rad dose  576 mGy-cm FINDINGS: OSSEOUS STRUCTURES:  No fracture, dislocation or destructive osseous lesion   VISUALIZED MUSCULATURE:  There is no evidence of intramuscular hematoma or other mass lesion in the right calf  SOFT TISSUES:  Mild diffuse subcutaneous edema  OTHER PERTINENT FINDINGS:  None  Impression: There is no evidence of intramuscular hematoma or other mass lesion in the right calf  Please note that rhabdomyolysis and compartment syndrome are clinical diagnoses, and are not excluded based on these imaging findings  Workstation performed: [id number]     Echo complete w/ contrast if indicated    Result Date: 9/27/2022  Narrative: Technically difficult but adequate transthoracic echocardiogram study  1  Normal left ventricular size and systolic function, probably normal diastolic function  Ejection fraction is estimated as around 63%  2  Normal right ventricular size and systolic function  3  Top normal right atrial cavity size  4  Aortic valve sclerosis, no aortic valve stenosis or regurgitation  5  Trace mitral valve regurgitation  6  Trace tricuspid and pulmonic valve regurgitation  7  No obvious pulmonary hypertension  8  No pericardial effusion  No previous echocardiogram is available for comparison  Echo follow up/limited w/ contrast if indicated    Result Date: 10/2/2022  Narrative: •  Left Ventricle: Left ventricle is not well visualized  Left ventricular cavity size is normal  Wall thickness is normal  The left ventricular ejection fraction is 43% by biplane measurement  Systolic function is mildly reduced  There is mild global hypokinesis with regional variation - there appeared to be possible hypokinesis of the mid inferior wall as well as the mid to apical anterior wall although visualization was poor       EKG/Pathology/Other Studies:   Lab Results   Component Value Date    VENTRATE 103 09/19/2022    ATRIALRATE 103 09/19/2022    PRINT 146 09/19/2022    QRSDINT 84 09/19/2022    QTINT 338 09/19/2022    QTCINT 442 09/19/2022    PAXIS 79 09/19/2022    QRSAXIS 93 09/19/2022    TWAVEAXIS 82 09/19/2022        Code Status: Level 1 - Full Code  Advance Directive and Living Will: Power of :    POLST:      Counseling / Coordination of Care: Total floor / unit time spent today 30 minutes  Greater than 50% of total time was spent with the patient and / or family counseling and / or coordination of care   A description of the counseling / coordination of care: Direct Patient Care

## 2022-10-07 NOTE — NURSING NOTE
Spoke to Bhavesh (MRI technician) about possible MRI today  She stated that she has scanned patient before and does not think the patient will be able to lay still enough for the scans  MÓNICA made aware at this time

## 2022-10-07 NOTE — ASSESSMENT & PLAN NOTE
MSSA bacteremia, repeat cultures neg for greater than 72 hours  Echo with no vegetation  ID recommend CHERYLE  Continue Ancef, continue po vanc for prophylaxis  Discussed with Cardiology, will plan for TTE, if images are susceptible, may be able to avoid CHERYLE  Duration of antibiotics will be based on echo

## 2022-10-07 NOTE — UTILIZATION REVIEW
Continued Stay Review    Date: 10/7/22              Current Patient Class: IP  Current Level of Care: MS    HPI:52 y o  female initially admitted on 9/19    Assessment/Plan:   Acute encephalopathy secondary to gabapentin and morphine with subsequent renal failure, potentially component of psychiatric illness given history and bacteremia  Poss delirium d/t long hospitalization  LP and MRI negative  Per Neuro pt will have slow if any improvement in coming weeks  On exam today pt is awake but confused  Tolerating a diet but continues with diarrhea, C diff negative  Continue Florastor, immodium  Has RLE edema - MRI RLE to be done, just ordered today  No sign SI, continues on Seroquel, Zyprexa PRN  Still with mild leukocytosis likely d/t bacteremia  Continue on antibiotics  Get CHERYLE when safe to do so - cardio says she is suboptimal pt to do so right now          Vital Signs:   10/07/22 0800 -- -- -- -- -- -- None (Room air) --   10/07/22 07:28:06 97 6 °F (36 4 °C) 91 21 138/86 103 92 % None (Room air) --   10/06/22 22:15:39 97 8 °F (36 6 °C) 102 18 118/78 91 99 % -- --   10/06/22 2100 -- -- -- -- -- -- None (Room air) --   10/06/22 1623 98 2 °F (36 8 °C) -- 16 141/78 99 -- -- --   10/06/22 0815 -- -- -- -- -- -- None (Room air) --   10/06/22 07:35:22 98 6 °F (37 °C) 101 17 153/95 114 98 % -- --   10/05/22 2100 -- -- -- -- -- -- None (Room air) --   10/05/22 19:58:01 97 8 °F (36 6 °C) 103 18 136/78 97 96 % -- --   10/05/22 15:24:36 -- 132 Abnormal  -- 112/93 99 97 % None (Room air) --   10/05/22 14:47:14 97 9 °F (36 6 °C) 104 20 127/70 89 97 % None (Room air) Lying   10/05/22 0815 -- -- -- -- -- -- None (Room air) --   10/05/22 07:16:09 99 1 °F (37 3 °C) 105 20 140/86 104 94 % -- --     Pertinent Labs/Diagnostic Results:   Results from last 7 days   Lab Units 10/01/22  0857   SARS-COV-2  Negative     Results from last 7 days   Lab Units 10/07/22  0834 10/06/22  0622 10/05/22  0449 10/04/22  0444 10/03/22  0507 WBC Thousand/uL 13 61* 14 36* 11 04* 18 05* 20 32*   HEMOGLOBIN g/dL 11 0* 10 4* 9 9* 10 8* 12 2   HEMATOCRIT % 34 0* 33 4* 29 7* 34 5* 38 8   PLATELETS Thousands/uL 299 318 326 320 341   NEUTROS ABS Thousands/µL 10 16* 10 03* 5 09 14 05* 16 47*         Results from last 7 days   Lab Units 10/07/22  0834 10/06/22  0622 10/05/22  7433 10/05/22  0449 10/04/22  0444 10/03/22  0913 10/03/22  0507 10/02/22  0505 10/01/22  0507 10/01/22  0145   SODIUM mmol/L 139 140 144 138 146   < >  --  148* 144 146   POTASSIUM mmol/L 4 3 3 9 3 2* 3 3* 2 9*   < >  --  3 1* 3 2* 3 1*   CHLORIDE mmol/L 104 104 105 107 102   < >  --  102 102 104   CO2 mmol/L 28 29 32 26 37*   < >  --  38* 33* 34*   ANION GAP mmol/L 7 7 7 5 7   < >  --  8 9 8   BUN mg/dL 24 24 30* 8 34*   < >  --  36* 39* 41*   CREATININE mg/dL 1 83* 2 03* 2 55* 0 79 3 07*   < >  --  4 79* 5 78* 5 88*   EGFR ml/min/1 73sq m 30 27 20 85 16   < >  --  9 7 7   CALCIUM mg/dL 10 2* 10 9* 9 9 9 0 9 7   < >  --  9 8 9 0 9 3   MAGNESIUM mg/dL  --   --   --   --   --   --  1 7 1 9 2 1 1 7   PHOSPHORUS mg/dL  --   --   --   --   --   --  2 6* 2 6* 5 3* 5 4*    < > = values in this interval not displayed       Results from last 7 days   Lab Units 10/04/22  0444 10/01/22  0507 10/01/22  0145   AST U/L 30 56* 66*   ALT U/L 18 47 45   ALK PHOS U/L 78 76 91   TOTAL PROTEIN g/dL 6 7 5 6* 6 2*   ALBUMIN g/dL 2 5* 2 3* 2 6*   TOTAL BILIRUBIN mg/dL 0 36 0 36 0 44         Results from last 7 days   Lab Units 10/07/22  0834 10/06/22  0622 10/05/22  3365 10/05/22  0449 10/04/22  0444 10/03/22  0913 10/02/22  0505 10/01/22  0507 10/01/22  0145   GLUCOSE RANDOM mg/dL 114 153* 264* 94 137 175* 165* 156* 133     Results from last 7 days   Lab Units 10/01/22  0145   PH CHARLES  7 398   PCO2 CHARLES mm Hg 47 4   PO2 CHARLES mm Hg 50 3*   HCO3 CHARLES mmol/L 28 6   BASE EXC CHARLES mmol/L 3 1   O2 CONTENT CHARLES ml/dL 14 3   O2 HGB, VENOUS % 85 2*         Results from last 7 days   Lab Units 10/04/22  0444   PROCALCITONIN ng/ml 0 26*     Results from last 7 days   Lab Units 10/01/22  0145   LACTIC ACID mmol/L 1 3     Results from last 7 days   Lab Units 10/03/22  1823   C DIFF TOXIN B BY PCR  Negative       Medications:   Scheduled Medications:  amLODIPine, 10 mg, Oral, Daily  cefazolin, 2,000 mg, Intravenous, Q12H  gabapentin, 100 mg, Oral, TID  heparin (porcine), 5,000 Units, Subcutaneous, Q8H CHOCO  metoprolol tartrate, 25 mg, Oral, Q12H [facility]  VANDANA ANTIFUNGAL, , Topical, BID  nicotine, 1 patch, Transdermal, Daily  QUEtiapine, 50 mg, Oral, BID  saccharomyces boulardii, 250 mg, Oral, BID      Continuous IV Infusions:     PRN Meds:  acetaminophen, 650 mg, Oral, Q6H PRN  ALPRAZolam, 0 5 mg, Oral, HS PRN  hydrALAZINE, 5 mg, Intravenous, Q6H PRN  HYDROmorphone, 0 5 mg, Intravenous, Q4H PRN  levalbuterol, 1 25 mg, Nebulization, Q4H PRN  loperamide, 2 mg, Oral, 4x Daily PRN - x 2 10/6, x1 10/7  OLANZapine, 5 mg, Oral, Q6H PRN  ondansetron, 4 mg, Intravenous, Q4H PRN    Discharge Plan: rehab     Network Utilization Review Department  ATTENTION: Please call with any questions or concerns to [phone] and carefully listen to the prompts so that you are directed to the right person  All voicemails are confidential   Benita Webb all requests for admission clinical reviews, approved or denied determinations and any other requests to dedicated fax number below belonging to the campus where the patient is receiving treatment   List of dedicated fax numbers for the Facilities:  [address] [place] [address] DENIALS (Administrative/Medical Necessity) [phone]   [address] (Maternity/NICU/Pediatrics) [phone]   8 Minda Baldwin [phone]   Keren Davila 77 [phone]   [address]   223 [facility] [address] [facility] [address] [place] [facility] 134 065 [facility] [phone]

## 2022-10-07 NOTE — MALNUTRITION/BMI
This medical record reflects one or more clinical indicators suggestive of malnutrition and/or morbid obesity  Malnutrition Findings:   Adult Malnutrition type: Acute illness  Adult Degree of Malnutrition: Malnutrition of mild degree  Malnutrition Characteristics: Inadequate energy                  360 Statement: Mild malnutrition r/t current medical condition as evidenced by consuming < 50% of estimated needs > 5 days, BMI 18 3  Treated with Ensure Compact tid    BMI Findings:  Adult BMI Classifications: Underweight < 18 5        Body mass index is 18 33 kg/m²  See Nutrition note dated 10/7/22for additional details  Completed nutrition assessment is viewable in the nutrition documentation

## 2022-10-07 NOTE — ASSESSMENT & PLAN NOTE
· ANUJA/ATN secondary to rhabdomyolysis    No evidence of renal obstruction on imaging  · Nephrology following  · No emergent need for hemodialysis  · Renal function improving, nephrology signed off    Results from last 7 days   Lab Units 10/07/22  0834 10/06/22  0622 10/05/22  8579 10/05/22  0449 10/04/22  0444 10/03/22  0913 10/02/22  0505 10/01/22  0507 10/01/22  0145   BUN mg/dL 24 24 30* 8 34* 34* 36* 39* 41*   CREATININE mg/dL 1 83* 2 03* 2 55* 0 79 3 07* 3 73* 4 79* 5 78* 5 88*   EGFR ml/min/1 73sq m 30 27 20 85 16 13 9 7 7

## 2022-10-07 NOTE — PROGRESS NOTES
Cardiology         Progress Note - Cardiology   Yanet Mascorro 45 y o  female MRN: [id number]  Unit/Bed#: [place] [address] -01 Encounter: [id number]          Assessment/Recommendations/Discussion:     1  MSSA bacteremia 2/2 blood cultures positive  2  Acute respiratory failure, acute diastolic heart failure due to volume overload  3  Toxic metabolic encephalopathy  4  LV Dysfunction  5  Acute renal failure presumed due to ATN and severe rhabdomyolysis  6  Rhabdomyolysis  7  Depression      · Patient remains a suboptimal candidate for CHERYLE in of ongoing encephalopathy, somnolence/delirium  Agree with Dr Lashawn Ma recommendation of treating with antibiotic therapy  Can continue assessing risk of CHERYLE as her mental status progresses  Will repeat limited echocardiogram on Monday to re-evaluate for vegetation  No cardiac murmur on exam           Subjective:  Patient seen and examined, somnolent                  Physical Exam:  GEN:  NAD, somnolent, delirious  HEENT:  MMM, NCAT, pink conjunctiva, EOMI, nonicteric sclera  CV:  NO JVD/HJR, RR, NO M/R/G, +S1/S2, NO PARASTERNAL HEAVE/THRILL, NO LE EDEMA, NO HEPATIC SYSTOLIC PULSATION, WARM EXTREMITIES  RESP:  CTAB/L  ABD:  SOFT, NT, NO GROSS ORGANOMEGALY        Vitals:   /86   Pulse 91   Temp 97 6 °F (36 4 °C) (Axillary)   Resp 21   Ht 5' 6" (1 676 m)   Wt 51 5 kg (113 lb 8 6 oz)   LMP 03/14/2019 (Approximate)   SpO2 92%   BMI 18 33 kg/m²   Vitals:    10/06/22 0600 10/07/22 0600   Weight: 51 9 kg (114 lb 6 7 oz) 51 5 kg (113 lb 8 6 oz)     No intake or output data in the 24 hours ending 10/07/22 1147      Lab Results:  Results from last 7 days   Lab Units 10/07/22  0834   WBC Thousand/uL 13 61*   HEMOGLOBIN g/dL 11 0*   HEMATOCRIT % 34 0*   PLATELETS Thousands/uL 299     Results from last 7 days   Lab Units 10/07/22  0834 10/05/22  0449 10/04/22  0444   POTASSIUM mmol/L 4 3   < > 2 9*   CHLORIDE mmol/L 104   < > 102   CO2 mmol/L 28   < > 37*   BUN mg/dL 24   < > 34*   CREATININE mg/dL 1 83*   < > 3 07*   CALCIUM mg/dL 10 2*   < > 9 7   ALK PHOS U/L  --   --  78   ALT U/L  --   --  18   AST U/L  --   --  30    < > = values in this interval not displayed       Results from last 7 days   Lab Units 10/07/22  0834   POTASSIUM mmol/L 4 3   CHLORIDE mmol/L 104   CO2 mmol/L 28   BUN mg/dL 24   CREATININE mg/dL 1 83*   CALCIUM mg/dL 10 2*           Medications:    Current Facility-Administered Medications:   •  acetaminophen (TYLENOL) tablet 650 mg, 650 mg, Oral, Q6H PRN, Atul Adan DO, 650 mg at 09/22/22 1040  •  ALPRAZolam (XANAX) tablet 0 5 mg, 0 5 mg, Oral, HS PRN, Lindsay Hastings MD  •  amLODIPine (NORVASC) tablet 10 mg, 10 mg, Oral, Daily, Tiffany Medina MD, 10 mg at 10/07/22 0820  •  ceFAZolin (ANCEF) IVPB (premix in dextrose) 2,000 mg 50 mL, 2,000 mg, Intravenous, Q12H, Mallorie Abbott MD, Last Rate: 100 mL/hr at 10/07/22 0156, 2,000 mg at 10/07/22 0156  •  gabapentin (NEURONTIN) capsule 100 mg, 100 mg, Oral, TID, Lindsay Hastings MD, 100 mg at 10/07/22 0820  •  heparin (porcine) subcutaneous injection 5,000 Units, 5,000 Units, Subcutaneous, Q8H [address], Atul Adan DO, 5,000 Units at 10/07/22 5852  •  hydrALAZINE (APRESOLINE) injection 5 mg, 5 mg, Intravenous, Q6H PRN, Víctor Santiago DO, 5 mg at 10/01/22 1236  •  HYDROmorphone (DILAUDID) injection 0 5 mg, 0 5 mg, Intravenous, Q4H PRN, Yoshi Ramos DO, 0 5 mg at 10/01/22 0031  •  levalbuterol (XOPENEX) inhalation solution 1 25 mg, 1 25 mg, Nebulization, Q4H PRN, Víctor Santiago DO  •  loperamide (IMODIUM) oral liquid 2 mg, 2 mg, Oral, 4x Daily PRN, Lindsay Hastings MD, 2 mg at 10/07/22 0820  •  metoprolol tartrate (LOPRESSOR) tablet 25 mg, 25 mg, Oral, Q12H [address], Lindsay Hastings MD, 25 mg at 10/07/22 0820  •  moisture barrier miconazole 2% cream (aka VANDANA MOISTURE BARRIER ANTIFUNGAL CREAM), , Topical, BID, Víctor Santiago DO, 1 application at 99/89/41 6364  •  nicotine (NICODERM CQ) 14 mg/24hr TD 24 hr patch 1 patch, 1 patch, Transdermal, Daily, Atul Adan DO, 1 patch at 10/06/22 5634  •  OLANZapine (ZyPREXA ZYDIS) dispersible tablet 5 mg, 5 mg, Oral, Q6H PRN, Jewels Jasso, DO, 5 mg at 10/02/22 2311  •  ondansetron (ZOFRAN) injection 4 mg, 4 mg, Intravenous, Q4H PRN, Dick Dillard DO  •  QUEtiapine (SEROquel) tablet 50 mg, 50 mg, Oral, BID, Della Hawkins MD, 50 mg at 10/07/22 8878  •  saccharomyces boulardii (FLORASTOR) capsule 250 mg, 250 mg, Oral, BID, Della Hawkins MD, 250 mg at 10/07/22 1101    This note was completed in part utilizing M-Modal Fluency Direct Software  Grammatical errors, random word insertions, spelling mistakes, and incomplete sentences may be an occasional consequence of this system secondary to software limitations, ambient noise, and hardware issues  If you have any questions or concerns about the content, text, or information contained within the body of this dictation, please contact the provider for clarification

## 2022-10-07 NOTE — ASSESSMENT & PLAN NOTE
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

## 2022-10-07 NOTE — ASSESSMENT & PLAN NOTE
· Acute encephalopathy secondary to gabapentin and morphine with subsequent renal failure, potentially component of psychiatric illness given history and bacteremia  · Consider hospital delirium due to prolong stay  · Received course of thiamine concern for Wernicke's encephalopathy  · LP and MRI negative for acute pathology, neurology evaluated  · Discussed with neurology, patient likely will have slow improvement if any over the coming weeks  · Continue dysphagia diet, patient tolerating, ST following

## 2022-10-08 LAB
ANION GAP SERPL CALCULATED.3IONS-SCNC: 8 MMOL/L (ref 4–13)
BASOPHILS # BLD AUTO: 0.14 THOUSANDS/ÂΜL (ref 0–0.1)
BASOPHILS NFR BLD AUTO: 1 % (ref 0–1)
BUN SERPL-MCNC: 27 MG/DL (ref 5–25)
CALCIUM SERPL-MCNC: 11.3 MG/DL (ref 8.3–10.1)
CHLORIDE SERPL-SCNC: 101 MMOL/L (ref 96–108)
CO2 SERPL-SCNC: 31 MMOL/L (ref 21–32)
CREAT SERPL-MCNC: 1.95 MG/DL (ref 0.6–1.3)
EOSINOPHIL # BLD AUTO: 0.18 THOUSAND/ÂΜL (ref 0–0.61)
EOSINOPHIL NFR BLD AUTO: 2 % (ref 0–6)
ERYTHROCYTE [DISTWIDTH] IN BLOOD BY AUTOMATED COUNT: 14.1 % (ref 11.6–15.1)
GFR SERPL CREATININE-BSD FRML MDRD: 28 ML/MIN/1.73SQ M
GLUCOSE SERPL-MCNC: 105 MG/DL (ref 65–140)
HCT VFR BLD AUTO: 37.8 % (ref 34.8–46.1)
HGB BLD-MCNC: 12 G/DL (ref 11.5–15.4)
IMM GRANULOCYTES # BLD AUTO: 0.11 THOUSAND/UL (ref 0–0.2)
IMM GRANULOCYTES NFR BLD AUTO: 1 % (ref 0–2)
LYMPHOCYTES # BLD AUTO: 3.1 THOUSANDS/ÂΜL (ref 0.6–4.47)
LYMPHOCYTES NFR BLD AUTO: 25 % (ref 14–44)
MCH RBC QN AUTO: 32.1 PG (ref 26.8–34.3)
MCHC RBC AUTO-ENTMCNC: 31.7 G/DL (ref 31.4–37.4)
MCV RBC AUTO: 101 FL (ref 82–98)
MONOCYTES # BLD AUTO: 0.66 THOUSAND/ÂΜL (ref 0.17–1.22)
MONOCYTES NFR BLD AUTO: 5 % (ref 4–12)
NEUTROPHILS # BLD AUTO: 8.2 THOUSANDS/ÂΜL (ref 1.85–7.62)
NEUTS SEG NFR BLD AUTO: 66 % (ref 43–75)
NRBC BLD AUTO-RTO: 0 /100 WBCS
PLATELET # BLD AUTO: 361 THOUSANDS/UL (ref 149–390)
PMV BLD AUTO: 11.7 FL (ref 8.9–12.7)
POTASSIUM SERPL-SCNC: 3.7 MMOL/L (ref 3.5–5.3)
RBC # BLD AUTO: 3.74 MILLION/UL (ref 3.81–5.12)
SODIUM SERPL-SCNC: 140 MMOL/L (ref 135–147)
WBC # BLD AUTO: 12.39 THOUSAND/UL (ref 4.31–10.16)

## 2022-10-08 RX ORDER — SODIUM CHLORIDE 9 MG/ML
75 INJECTION, SOLUTION INTRAVENOUS CONTINUOUS
Status: DISCONTINUED | OUTPATIENT
Start: 2022-10-08 | End: 2022-10-10

## 2022-10-08 RX ADMIN — CEFAZOLIN SODIUM 2000 MG: 2 SOLUTION INTRAVENOUS at 13:40

## 2022-10-08 RX ADMIN — Medication 1 PATCH: at 08:10

## 2022-10-08 RX ADMIN — METOPROLOL TARTRATE 25 MG: 25 TABLET, FILM COATED ORAL at 21:40

## 2022-10-08 RX ADMIN — HEPARIN SODIUM 5000 UNITS: 5000 INJECTION INTRAVENOUS; SUBCUTANEOUS at 21:41

## 2022-10-08 RX ADMIN — Medication 250 MG: at 18:12

## 2022-10-08 RX ADMIN — METOPROLOL TARTRATE 25 MG: 25 TABLET, FILM COATED ORAL at 08:10

## 2022-10-08 RX ADMIN — Medication 250 MG: at 08:10

## 2022-10-08 RX ADMIN — QUETIAPINE FUMARATE 50 MG: 25 TABLET ORAL at 08:10

## 2022-10-08 RX ADMIN — GABAPENTIN 100 MG: 100 CAPSULE ORAL at 15:59

## 2022-10-08 RX ADMIN — ALPRAZOLAM 0.5 MG: 0.5 TABLET ORAL at 01:05

## 2022-10-08 RX ADMIN — GABAPENTIN 100 MG: 100 CAPSULE ORAL at 21:40

## 2022-10-08 RX ADMIN — QUETIAPINE FUMARATE 50 MG: 25 TABLET ORAL at 18:12

## 2022-10-08 RX ADMIN — AMLODIPINE BESYLATE 10 MG: 10 TABLET ORAL at 08:10

## 2022-10-08 RX ADMIN — MICONAZOLE NITRATE: 20 CREAM TOPICAL at 08:17

## 2022-10-08 RX ADMIN — GABAPENTIN 100 MG: 100 CAPSULE ORAL at 08:10

## 2022-10-08 RX ADMIN — SODIUM CHLORIDE 75 ML/HR: 0.9 INJECTION, SOLUTION INTRAVENOUS at 12:27

## 2022-10-08 RX ADMIN — MICONAZOLE NITRATE: 20 CREAM TOPICAL at 18:11

## 2022-10-08 RX ADMIN — CEFAZOLIN SODIUM 2000 MG: 2 SOLUTION INTRAVENOUS at 01:05

## 2022-10-08 RX ADMIN — HEPARIN SODIUM 5000 UNITS: 5000 INJECTION INTRAVENOUS; SUBCUTANEOUS at 05:05

## 2022-10-08 RX ADMIN — HEPARIN SODIUM 5000 UNITS: 5000 INJECTION INTRAVENOUS; SUBCUTANEOUS at 13:40

## 2022-10-08 NOTE — PROGRESS NOTES
[facility]  Progress Note - Cristal Braden 1970, 45 y o  female MRN: [id number]  Unit/Bed#: [facility] 68 2 -01 Encounter: [id number]  Primary Care Provider: Akilah Womack MD   Date and time admitted to hospital: 9/19/2022  8:04 AM    * Toxic metabolic encephalopathy  Assessment & Plan  · Acute encephalopathy secondary to gabapentin and morphine with subsequent renal failure, potentially component of psychiatric illness given history and bacteremia  · Consider hospital delirium due to prolong stay  · Received course of thiamine concern for Wernicke's encephalopathy  · LP and MRI negative for acute pathology, neurology evaluated  · Discussed with neurology, patient likely will have slow improvement if any over the coming weeks  · Continue dysphagia diet, patient tolerating, ST following  · Re-consult Psychiatry on 10/07, recommending no further medication titration        Diarrhea  Assessment & Plan  Reports having multiple loose bowel movements  C diff negative, no prior history  Continue florastor, immodium prn    Bacteremia due to methicillin susceptible Staphylococcus aureus (MSSA)  Assessment & Plan  MSSA bacteremia, repeat cultures neg for greater than 72 hours  Echo with no vegetation  Continue Ancef, continue po vanc for prophylaxis  Infectious disease following  TTE repeat order, cardiology does not feel that patient would be stable for CHERYLE    Depression  Assessment & Plan  · Does have history of suicide attempt a currently does not show any signs of thoughts of self-harm  · Psychiatry consultation appreciated  · Continue Seroquel 50 mg twice a day   · Zyprexa 5 mg by mouth every 6 hours PRN agitation        VTE Pharmacologic Prophylaxis:   Pharmacologic: Heparin  Mechanical VTE Prophylaxis in Place: Yes    Patient Centered Rounds: I have performed bedside rounds with nursing staff today      Discussions with Specialists or Other Care Team Provider: CM    Education and Discussions with Family / Patient:     Time Spent for Care: 30 minutes  More than 50% of total time spent on counseling and coordination of care as described above  Current Length of Stay: 19 day(s)    Current Patient Status: Inpatient   Certification Statement: The patient will continue to require additional inpatient hospital stay due to monitor mental status, will need long term placement, CM following    Discharge Plan: pending, TTE on Monday, continue IV abx, will need IV abx    Code Status: Level 1 - Full Code      Subjective:   No events overnight, mental status appears unchanged from day prior  Poor appetite, tolerating diet by mouth  Objective:     Vitals:   Temp (24hrs), Av °F (36 1 °C), Min:96 9 °F (36 1 °C), Max:97 1 °F (36 2 °C)    Temp:  [96 9 °F (36 1 °C)-97 1 °F (36 2 °C)] 97 1 °F (36 2 °C)  HR:  [94] 94  Resp:  [17-20] 20  BP: (114-135)/(77-89) 135/89  SpO2:  [99 %] 99 %  Body mass index is 18 33 kg/m²  Input and Output Summary (last 24 hours):     No intake or output data in the 24 hours ending 10/08/22 1148    Physical Exam:     Physical Exam  Vitals and nursing note reviewed  Constitutional:       General: She is not in acute distress  Appearance: She is not ill-appearing  HENT:      Head: Normocephalic and atraumatic  Eyes:      General: No scleral icterus  Conjunctiva/sclera: Conjunctivae normal    Cardiovascular:      Rate and Rhythm: Normal rate  Pulses: Normal pulses  Pulmonary:      Effort: Pulmonary effort is normal  No respiratory distress  Abdominal:      General: Bowel sounds are normal  There is no distension  Palpations: Abdomen is soft  Musculoskeletal:         General: No swelling  Right lower leg: No edema  Left lower leg: No edema  Skin:     General: Skin is warm and dry  Neurological:      Mental Status: She is disoriented  Psychiatric:         Behavior: Behavior is slowed  Behavior is cooperative           Cognition and Memory: Cognition is impaired  Judgment: Judgment is impulsive  Additional Data:     Labs:    Results from last 7 days   Lab Units 10/08/22  0441   WBC Thousand/uL 12 39*   HEMOGLOBIN g/dL 12 0   HEMATOCRIT % 37 8   PLATELETS Thousands/uL 361   NEUTROS PCT % 66   LYMPHS PCT % 25   MONOS PCT % 5   EOS PCT % 2     Results from last 7 days   Lab Units 10/08/22  0441 10/05/22  0449 10/04/22  0444   SODIUM mmol/L 140   < > 146   POTASSIUM mmol/L 3 7   < > 2 9*   CHLORIDE mmol/L 101   < > 102   CO2 mmol/L 31   < > 37*   BUN mg/dL 27*   < > 34*   CREATININE mg/dL 1 95*   < > 3 07*   ANION GAP mmol/L 8   < > 7   CALCIUM mg/dL 11 3*   < > 9 7   ALBUMIN g/dL  --   --  2 5*   TOTAL BILIRUBIN mg/dL  --   --  0 36   ALK PHOS U/L  --   --  78   ALT U/L  --   --  18   AST U/L  --   --  30   GLUCOSE RANDOM mg/dL 105   < > 137    < > = values in this interval not displayed  Results from last 7 days   Lab Units 10/04/22  0444   PROCALCITONIN ng/ml 0 26*           * I Have Reviewed All Lab Data Listed Above  * Additional Pertinent Lab Tests Reviewed: [facility]    Imaging:    CT chest abdomen pelvis wo contrast    Result Date: 9/19/2022  Impression: Bilateral groundglass opacities with superimposed inter and intralobular septal thickening  Differential diagnosis includes pulmonary hemorrhage, infection, and pulmonary edema, although the distribution is atypical for edema  The study was marked in [facility] for immediate notification  Workstation performed: [id number]     XR hip/pelv 2-3 vws right    Result Date: 9/19/2022  Impression: No acute osseous abnormality  Workstation performed: [id number]     XR ankle 3+ views RIGHT    Result Date: 9/19/2022  Impression: No acute osseous abnormality  Workstation performed: [id number]     XR foot 3+ views RIGHT    Result Date: 9/19/2022  Impression: No acute osseous abnormality   Workstation performed: [id number]     CT head without contrast    Result Date: 9/19/2022  Impression: No acute intracranial process  No skull fracture  Workstation performed: [id number]     CT spine cervical without contrast    Result Date: 9/19/2022  Impression: No cervical spine fracture or traumatic malalignment  Incidental finding of partially visualized subpleural groundglass opacity in the left pulmonary apex presumably scarring  Cannot exclude infiltrate  This study demonstrates a significant  finding and was documented as such in [facility] for liaison and referring practitioner notification  Workstation performed: [id number]     MRI brain wo contrast    Result Date: 9/23/2022  Impression: No evidence of recent infarct  No mass effect or midline shift  Study performed in the limited fashion with extensive motion artifact  Workstation performed: [id number]     XR chest 1 view    Result Date: 9/19/2022  Impression: No acute cardiopulmonary disease  Findings are stable Workstation performed: [id number]     US right upper quadrant with liver dopplers    Result Date: 9/20/2022  Impression: 1  Minimal layering sludge without evidence of acute cholecystitis  2   Normal-appearing liver with normal liver Dopplers  Workstation performed: [id number]     CT lower extremity wo contrast right    Result Date: 9/19/2022  Impression: There is no evidence of intramuscular hematoma or other mass lesion in the right calf  Please note that rhabdomyolysis and compartment syndrome are clinical diagnoses, and are not excluded based on these imaging findings   Workstation performed: [id number]       Recent Cultures (last 7 days):     Results from last 7 days   Lab Units 10/03/22  1823   C DIFF TOXIN B BY PCR  Negative       Last 24 Hours Medication List:   Current Facility-Administered Medications   Medication Dose Route Frequency Provider Last Rate   • acetaminophen  650 mg Oral Q6H PRN Luz Maria Ospina DO     • ALPRAZolam  0 5 mg Oral HS PRN Luz Michel MD     • amLODIPine  10 mg Oral Daily Macho Sadler MD     • cefazolin  2,000 mg Intravenous Q12H Royal Varghese MD 2,000 mg (10/08/22 0105)   • gabapentin  100 mg Oral TID Della Hawkins MD     • heparin (porcine)  5,000 Units Subcutaneous [facility] Atul Adan, DO     • hydrALAZINE  5 mg Intravenous Q6H PRN Jewels Jasso DO     • HYDROmorphone  0 5 mg Intravenous Q4H PRN Dick Dillard DO     • levalbuterol  1 25 mg Nebulization Q4H PRN Jewels Jasso, DO     • loperamide  2 mg Oral 4x Daily PRN Della Hawkins MD     • metoprolol tartrate  25 mg Oral Q12H [facility] Inder Abreu MD     • VANDANA ANTIFUNGAL   Topical BID Jewels Jasso, DO     • nicotine  1 patch Transdermal Daily Atul Adan DO     • OLANZapine  5 mg Oral Q6H PRN Jewels Jasso DO     • ondansetron  4 mg Intravenous Q4H PRN Dick Dillard DO     • QUEtiapine  50 mg Oral BID Della Hawkins MD     • saccharomyces boulardii  250 mg Oral BID Della Hawkins MD          Today, Patient Was Seen By: Della Hawkins    ** Please Note: Dictation voice to text software may have been used in the creation of this document   **

## 2022-10-08 NOTE — PLAN OF CARE
Problem: PAIN - ADULT  Goal: Verbalizes/displays adequate comfort level or baseline comfort level  Description: Interventions:  - Encourage patient to monitor pain and request assistance  - Assess pain using appropriate pain scale  - Administer analgesics based on type and severity of pain and evaluate response  - Implement non-pharmacological measures as appropriate and evaluate response  - Consider cultural and social influences on pain and pain management  - Notify physician/advanced practitioner if interventions unsuccessful or patient reports new pain  Outcome: Progressing     Problem: INFECTION - ADULT  Goal: Absence or prevention of progression during hospitalization  Description: INTERVENTIONS:  - Assess and monitor for signs and symptoms of infection  - Monitor lab/diagnostic results  - Monitor all insertion sites, i e  indwelling lines, tubes, and drains  - Monitor endotracheal if appropriate and nasal secretions for changes in amount and color  - Bynum appropriate cooling/warming therapies per order  - Administer medications as ordered  - Instruct and encourage patient and family to use good hand hygiene technique  - Identify and instruct in appropriate isolation precautions for identified infection/condition  Outcome: Progressing     Problem: SAFETY ADULT  Goal: Patient will remain free of falls  Description: INTERVENTIONS:  - Educate patient/family on patient safety including physical limitations  - Instruct patient to call for assistance with activity   - Consult OT/PT to assist with strengthening/mobility   - Keep Call bell within reach  - Keep bed low and locked with side rails adjusted as appropriate  - Keep care items and personal belongings within reach  - Initiate and maintain comfort rounds  - Make Fall Risk Sign visible to staff  - Offer Toileting every 2 Hours, in advance of need  - Initiate/Maintain bed alarm  - Obtain necessary fall risk management equipment: walker  - Apply yellow socks and bracelet for high fall risk patients  - Consider moving patient to room near nurses station  Outcome: Progressing  Goal: Maintain or return to baseline ADL function  Description: INTERVENTIONS:  -  Assess patient's ability to carry out ADLs; assess patient's baseline for ADL function and identify physical deficits which impact ability to perform ADLs (bathing, care of mouth/teeth, toileting, grooming, dressing, etc )  - Assess/evaluate cause of self-care deficits   - Assess range of motion  - Assess patient's mobility; develop plan if impaired  - Assess patient's need for assistive devices and provide as appropriate  - Encourage maximum independence but intervene and supervise when necessary  - Involve family in performance of ADLs  - Assess for home care needs following discharge   - Consider OT consult to assist with ADL evaluation and planning for discharge  - Provide patient education as appropriate  Outcome: Progressing  Goal: Maintains/Returns to pre admission functional level  Description: INTERVENTIONS:  - Perform BMAT or MOVE assessment daily    - Set and communicate daily mobility goal to care team and patient/family/caregiver  - Collaborate with rehabilitation services on mobility goals if consulted  - Perform Range of Motion 4-6 times a day  - Reposition patient every 2 hours    - Dangle patient 3-4 times a day  - Stand patient 3 times a day  - Ambulate patient 3-4 times a day  - Out of bed for toileting  - Record patient progress and toleration of activity level   Outcome: Progressing     Problem: DISCHARGE PLANNING  Goal: Discharge to home or other facility with appropriate resources  Description: INTERVENTIONS:  - Identify barriers to discharge w/patient and caregiver  - Arrange for needed discharge resources and transportation as appropriate  - Identify discharge learning needs (meds, wound care, etc )  - Arrange for interpretive services to assist at discharge as needed  - Refer to Case Management Department for coordinating discharge planning if the patient needs post-hospital services based on physician/advanced practitioner order or complex needs related to functional status, cognitive ability, or social support system  Outcome: Progressing     Problem: Knowledge Deficit  Goal: Patient/family/caregiver demonstrates understanding of disease process, treatment plan, medications, and discharge instructions  Description: Complete learning assessment and assess knowledge base    Interventions:  - Provide teaching at level of understanding  - Provide teaching via preferred learning methods  Outcome: Progressing     Problem: Potential for Falls  Goal: Patient will remain free of falls  Description: INTERVENTIONS:  - Educate patient/family on patient safety including physical limitations  - Instruct patient to call for assistance with activity   - Consult OT/PT to assist with strengthening/mobility   - Keep Call bell within reach  - Keep bed low and locked with side rails adjusted as appropriate  - Keep care items and personal belongings within reach  - Initiate and maintain comfort rounds  - Make Fall Risk Sign visible to staff  - Offer Toileting every 2 Hours, in advance of need  - Initiate/Maintain bed alarm  - Obtain necessary fall risk management equipment: walker  - Apply yellow socks and bracelet for high fall risk patients  - Consider moving patient to room near nurses station  Outcome: Progressing     Problem: MOBILITY - ADULT  Goal: Maintain or return to baseline ADL function  Description: INTERVENTIONS:  -  Assess patient's ability to carry out ADLs; assess patient's baseline for ADL function and identify physical deficits which impact ability to perform ADLs (bathing, care of mouth/teeth, toileting, grooming, dressing, etc )  - Assess/evaluate cause of self-care deficits   - Assess range of motion  - Assess patient's mobility; develop plan if impaired  - Assess patient's need for assistive devices and provide as appropriate  - Encourage maximum independence but intervene and supervise when necessary  - Involve family in performance of ADLs  - Assess for home care needs following discharge   - Consider OT consult to assist with ADL evaluation and planning for discharge  - Provide patient education as appropriate  Outcome: Progressing  Goal: Maintains/Returns to pre admission functional level  Description: INTERVENTIONS:  - Perform BMAT or MOVE assessment daily    - Set and communicate daily mobility goal to care team and patient/family/caregiver  - Collaborate with rehabilitation services on mobility goals if consulted  - Perform Range of Motion 4-6 times a day  - Reposition patient every 2 hours  - Dangle patient 3-4 times a day  - Stand patient 3 times a day  - Ambulate patient 3-4 times a day  - Out of bed for toileting  - Record patient progress and toleration of activity level   Outcome: Progressing     Problem: Prexisting or High Potential for Compromised Skin Integrity  Goal: Skin integrity is maintained or improved  Description: INTERVENTIONS:  - Identify patients at risk for skin breakdown  - Assess and monitor skin integrity  - Assess and monitor nutrition and hydration status  - Monitor labs   - Assess for incontinence   - Turn and reposition patient  - Assist with mobility/ambulation  - Relieve pressure over bony prominences  - Avoid friction and shearing  - Provide appropriate hygiene as needed including keeping skin clean and dry  - Evaluate need for skin moisturizer/barrier cream  - Collaborate with interdisciplinary team   - Patient/family teaching  - Consider wound care consult   Outcome: Progressing     Problem: Nutrition/Hydration-ADULT  Goal: Nutrient/Hydration intake appropriate for improving, restoring or maintaining nutritional needs  Description: Monitor and assess patient's nutrition/hydration status for malnutrition   Collaborate with interdisciplinary team and initiate plan and interventions as ordered  Monitor patient's weight and dietary intake as ordered or per policy  Utilize nutrition screening tool and intervene as necessary  Determine patient's food preferences and provide high-protein, high-caloric foods as appropriate  INTERVENTIONS:  - Monitor oral intake, urinary output, labs, and treatment plans  - Assess nutrition and hydration status and recommend course of action  - Evaluate amount of meals eaten  - Assist patient with eating if necessary   - Allow adequate time for meals  - Recommend/ encourage appropriate diets, oral nutritional supplements, and vitamin/mineral supplements  - Order, calculate, and assess calorie counts as needed  - Recommend, monitor, and adjust tube feedings and TPN/PPN based on assessed needs  - Assess need for intravenous fluids  - Provide specific nutrition/hydration education as appropriate  - Include patient/family/caregiver in decisions related to nutrition  Outcome: Progressing     Problem: NEUROSENSORY - ADULT  Goal: Achieves maximal functionality and self care  Description: INTERVENTIONS  - Monitor swallowing and airway patency with patient fatigue and changes in neurological status  - Encourage and assist patient to increase activity and self care     - Encourage visually impaired, hearing impaired and aphasic patients to use assistive/communication devices  Outcome: Progressing     Problem: CARDIOVASCULAR - ADULT  Goal: Maintains optimal cardiac output and hemodynamic stability  Description: INTERVENTIONS:  - Monitor I/O, vital signs and rhythm  - Monitor for S/S and trends of decreased cardiac output  - Administer and titrate ordered vasoactive medications to optimize hemodynamic stability  - Assess quality of pulses, skin color and temperature  - Assess for signs of decreased coronary artery perfusion  - Instruct patient to report change in severity of symptoms  Outcome: Progressing     Problem: RESPIRATORY - ADULT  Goal: Achieves optimal ventilation and oxygenation  Description: INTERVENTIONS:  - Assess for changes in respiratory status  - Assess for changes in mentation and behavior  - Position to facilitate oxygenation and minimize respiratory effort  - Oxygen administered by appropriate delivery if ordered  - Initiate smoking cessation education as indicated  - Encourage broncho-pulmonary hygiene including cough, deep breathe, Incentive Spirometry  - Assess the need for suctioning and aspirate as needed  - Assess and instruct to report SOB or any respiratory difficulty  - Respiratory Therapy support as indicated  Outcome: Progressing     Problem: GASTROINTESTINAL - ADULT  Goal: Maintains adequate nutritional intake  Description: INTERVENTIONS:  - Monitor percentage of each meal consumed  - Identify factors contributing to decreased intake, treat as appropriate  - Assist with meals as needed  - Monitor I&O, weight, and lab values if indicated  - Obtain nutrition services referral as needed  Outcome: Progressing     Problem: METABOLIC, FLUID AND ELECTROLYTES - ADULT  Goal: Electrolytes maintained within normal limits  Description: INTERVENTIONS:  - Monitor labs and assess patient for signs and symptoms of electrolyte imbalances  - Administer electrolyte replacement as ordered  - Monitor response to electrolyte replacements, including repeat lab results as appropriate  - Instruct patient on fluid and nutrition as appropriate  Outcome: Progressing  Goal: Fluid balance maintained  Description: INTERVENTIONS:  - Monitor labs   - Monitor I/O and WT  - Instruct patient on fluid and nutrition as appropriate  - Assess for signs & symptoms of volume excess or deficit  Outcome: Progressing     Problem: SKIN/TISSUE INTEGRITY - ADULT  Goal: Skin Integrity remains intact(Skin Breakdown Prevention)  Description: Assess:  -Perform Erickson assessment every shift  -Clean and moisturize skin every day  -Inspect skin when repositioning, toileting, and assisting with ADLS  -Assess extremities for adequate circulation and sensation     Bed Management:  -Have minimal linens on bed & keep smooth, unwrinkled  -Change linens as needed when moist or perspiring  -Avoid sitting or lying in one position for more than 2 hours while in bed  -Keep HOB at 45 degrees     Toileting:  -Offer bedside commode  -Assess for incontinence every 2 hours  -Use incontinent care products after each incontinent episode such as brief    Activity:  -Mobilize patient 3-4 times a day  -Encourage activity and walks on unit  -Encourage or provide ROM exercises   -Turn and reposition patient every 2 Hours  -Use appropriate equipment to lift or move patient in bed  -Instruct/ Assist with weight shifting every 2 when out of bed in chair  -Consider limitation of chair time 2 hour intervals    Skin Care:  -Avoid use of baby powder, tape, friction and shearing, hot water or constrictive clothing  -Relieve pressure over bony prominences using pillows  -Do not massage red bony areas    Next Steps:  -Teach patient strategies to minimize risks such as walker  -Consider consults to  interdisciplinary teams such as wound  Outcome: Progressing  Goal: Incision(s), wounds(s) or drain site(s) healing without S/S of infection  Description: INTERVENTIONS  - Assess and document dressing, incision, wound bed, drain sites and surrounding tissue  - Provide patient and family education  - Perform skin care/dressing changes every PRN  Outcome: Progressing     Problem: MUSCULOSKELETAL - ADULT  Goal: Maintain or return mobility to safest level of function  Description: INTERVENTIONS:  - Assess patient's ability to carry out ADLs; assess patient's baseline for ADL function and identify physical deficits which impact ability to perform ADLs (bathing, care of mouth/teeth, toileting, grooming, dressing, etc )  - Assess/evaluate cause of self-care deficits   - Assess range of motion  - Assess patient's mobility  - Assess patient's need for assistive devices and provide as appropriate  - Encourage maximum independence but intervene and supervise when necessary  - Involve family in performance of ADLs  - Assess for home care needs following discharge   - Consider OT consult to assist with ADL evaluation and planning for discharge  - Provide patient education as appropriate  Outcome: Progressing

## 2022-10-08 NOTE — PLAN OF CARE
Problem: PAIN - ADULT  Goal: Verbalizes/displays adequate comfort level or baseline comfort level  Description: Interventions:  - Encourage patient to monitor pain and request assistance  - Assess pain using appropriate pain scale  - Administer analgesics based on type and severity of pain and evaluate response  - Implement non-pharmacological measures as appropriate and evaluate response  - Consider cultural and social influences on pain and pain management  - Notify physician/advanced practitioner if interventions unsuccessful or patient reports new pain  Outcome: Progressing     Problem: INFECTION - ADULT  Goal: Absence or prevention of progression during hospitalization  Description: INTERVENTIONS:  - Assess and monitor for signs and symptoms of infection  - Monitor lab/diagnostic results  - Monitor all insertion sites, i e  indwelling lines, tubes, and drains  - Monitor endotracheal if appropriate and nasal secretions for changes in amount and color  - [place] appropriate cooling/warming therapies per order  - Administer medications as ordered  - Instruct and encourage patient and family to use good hand hygiene technique  - Identify and instruct in appropriate isolation precautions for identified infection/condition  Outcome: Progressing     Problem: SAFETY ADULT  Goal: Patient will remain free of falls  Description: INTERVENTIONS:  - Educate patient/family on patient safety including physical limitations  - Instruct patient to call for assistance with activity   - Consult OT/PT to assist with strengthening/mobility   - Keep Call bell within reach  - Keep bed low and locked with side rails adjusted as appropriate  - Keep care items and personal belongings within reach  - Initiate and maintain comfort rounds  - Make Fall Risk Sign visible to staff  - Offer Toileting every 2 Hours, in advance of need  - Initiate/Maintain bed alarm  - Obtain necessary fall risk management equipment: walker  - Apply yellow socks and bracelet for high fall risk patients  - Consider moving patient to room near nurses station  Outcome: Progressing  Goal: Maintain or return to baseline ADL function  Description: INTERVENTIONS:  -  Assess patient's ability to carry out ADLs; assess patient's baseline for ADL function and identify physical deficits which impact ability to perform ADLs (bathing, care of mouth/teeth, toileting, grooming, dressing, etc )  - Assess/evaluate cause of self-care deficits   - Assess range of motion  - Assess patient's mobility; develop plan if impaired  - Assess patient's need for assistive devices and provide as appropriate  - Encourage maximum independence but intervene and supervise when necessary  - Involve family in performance of ADLs  - Assess for home care needs following discharge   - Consider OT consult to assist with ADL evaluation and planning for discharge  - Provide patient education as appropriate  Outcome: Progressing  Goal: Maintains/Returns to pre admission functional level  Description: INTERVENTIONS:  - Perform BMAT or MOVE assessment daily    - Set and communicate daily mobility goal to care team and patient/family/caregiver  - Collaborate with rehabilitation services on mobility goals if consulted  - Perform Range of Motion 4-6 times a day  - Reposition patient every 2 hours    - Dangle patient 3-4 times a day  - Stand patient 3 times a day  - Ambulate patient 3-4 times a day  - Out of bed for toileting  - Record patient progress and toleration of activity level   Outcome: Progressing     Problem: DISCHARGE PLANNING  Goal: Discharge to home or other facility with appropriate resources  Description: INTERVENTIONS:  - Identify barriers to discharge w/patient and caregiver  - Arrange for needed discharge resources and transportation as appropriate  - Identify discharge learning needs (meds, wound care, etc )  - Arrange for interpretive services to assist at discharge as needed  - Refer to Case Management Department for coordinating discharge planning if the patient needs post-hospital services based on physician/advanced practitioner order or complex needs related to functional status, cognitive ability, or social support system  Outcome: Progressing     Problem: Knowledge Deficit  Goal: Patient/family/caregiver demonstrates understanding of disease process, treatment plan, medications, and discharge instructions  Description: Complete learning assessment and assess knowledge base    Interventions:  - Provide teaching at level of understanding  - Provide teaching via preferred learning methods  Outcome: Progressing     Problem: Potential for Falls  Goal: Patient will remain free of falls  Description: INTERVENTIONS:  - Educate patient/family on patient safety including physical limitations  - Instruct patient to call for assistance with activity   - Consult OT/PT to assist with strengthening/mobility   - Keep Call bell within reach  - Keep bed low and locked with side rails adjusted as appropriate  - Keep care items and personal belongings within reach  - Initiate and maintain comfort rounds  - Make Fall Risk Sign visible to staff  - Offer Toileting every 2 Hours, in advance of need  - Initiate/Maintain bed alarm  - Obtain necessary fall risk management equipment: walker  - Apply yellow socks and bracelet for high fall risk patients  - Consider moving patient to room near nurses station  Outcome: Progressing     Problem: MOBILITY - ADULT  Goal: Maintain or return to baseline ADL function  Description: INTERVENTIONS:  -  Assess patient's ability to carry out ADLs; assess patient's baseline for ADL function and identify physical deficits which impact ability to perform ADLs (bathing, care of mouth/teeth, toileting, grooming, dressing, etc )  - Assess/evaluate cause of self-care deficits   - Assess range of motion  - Assess patient's mobility; develop plan if impaired  - Assess patient's need for assistive devices and provide as appropriate  - Encourage maximum independence but intervene and supervise when necessary  - Involve family in performance of ADLs  - Assess for home care needs following discharge   - Consider OT consult to assist with ADL evaluation and planning for discharge  - Provide patient education as appropriate  Outcome: Progressing  Goal: Maintains/Returns to pre admission functional level  Description: INTERVENTIONS:  - Perform BMAT or MOVE assessment daily    - Set and communicate daily mobility goal to care team and patient/family/caregiver  - Collaborate with rehabilitation services on mobility goals if consulted  - Perform Range of Motion 4-6 times a day  - Reposition patient every 2 hours  - Dangle patient 3-4 times a day  - Stand patient 3 times a day  - Ambulate patient 3-4 times a day  - Out of bed for toileting  - Record patient progress and toleration of activity level   Outcome: Progressing     Problem: Prexisting or High Potential for Compromised Skin Integrity  Goal: Skin integrity is maintained or improved  Description: INTERVENTIONS:  - Identify patients at risk for skin breakdown  - Assess and monitor skin integrity  - Assess and monitor nutrition and hydration status  - Monitor labs   - Assess for incontinence   - Turn and reposition patient  - Assist with mobility/ambulation  - Relieve pressure over bony prominences  - Avoid friction and shearing  - Provide appropriate hygiene as needed including keeping skin clean and dry  - Evaluate need for skin moisturizer/barrier cream  - Collaborate with interdisciplinary team   - Patient/family teaching  - Consider wound care consult   Outcome: Progressing     Problem: Nutrition/Hydration-ADULT  Goal: Nutrient/Hydration intake appropriate for improving, restoring or maintaining nutritional needs  Description: Monitor and assess patient's nutrition/hydration status for malnutrition   Collaborate with interdisciplinary team and initiate plan and interventions as ordered  Monitor patient's weight and dietary intake as ordered or per policy  Utilize nutrition screening tool and intervene as necessary  Determine patient's food preferences and provide high-protein, high-caloric foods as appropriate  INTERVENTIONS:  - Monitor oral intake, urinary output, labs, and treatment plans  - Assess nutrition and hydration status and recommend course of action  - Evaluate amount of meals eaten  - Assist patient with eating if necessary   - Allow adequate time for meals  - Recommend/ encourage appropriate diets, oral nutritional supplements, and vitamin/mineral supplements  - Order, calculate, and assess calorie counts as needed  - Recommend, monitor, and adjust tube feedings and TPN/PPN based on assessed needs  - Assess need for intravenous fluids  - Provide specific nutrition/hydration education as appropriate  - Include patient/family/caregiver in decisions related to nutrition  Outcome: Progressing     Problem: NEUROSENSORY - ADULT  Goal: Achieves maximal functionality and self care  Description: INTERVENTIONS  - Monitor swallowing and airway patency with patient fatigue and changes in neurological status  - Encourage and assist patient to increase activity and self care     - Encourage visually impaired, hearing impaired and aphasic patients to use assistive/communication devices  Outcome: Progressing     Problem: CARDIOVASCULAR - ADULT  Goal: Maintains optimal cardiac output and hemodynamic stability  Description: INTERVENTIONS:  - Monitor I/O, vital signs and rhythm  - Monitor for S/S and trends of decreased cardiac output  - Administer and titrate ordered vasoactive medications to optimize hemodynamic stability  - Assess quality of pulses, skin color and temperature  - Assess for signs of decreased coronary artery perfusion  - Instruct patient to report change in severity of symptoms  Outcome: Progressing     Problem: RESPIRATORY - ADULT  Goal: Achieves optimal ventilation and oxygenation  Description: INTERVENTIONS:  - Assess for changes in respiratory status  - Assess for changes in mentation and behavior  - Position to facilitate oxygenation and minimize respiratory effort  - Oxygen administered by appropriate delivery if ordered  - Initiate smoking cessation education as indicated  - Encourage broncho-pulmonary hygiene including cough, deep breathe, Incentive Spirometry  - Assess the need for suctioning and aspirate as needed  - Assess and instruct to report SOB or any respiratory difficulty  - Respiratory Therapy support as indicated  Outcome: Progressing     Problem: GASTROINTESTINAL - ADULT  Goal: Maintains adequate nutritional intake  Description: INTERVENTIONS:  - Monitor percentage of each meal consumed  - Identify factors contributing to decreased intake, treat as appropriate  - Assist with meals as needed  - Monitor I&O, weight, and lab values if indicated  - Obtain nutrition services referral as needed  Outcome: Progressing     Problem: METABOLIC, FLUID AND ELECTROLYTES - ADULT  Goal: Electrolytes maintained within normal limits  Description: INTERVENTIONS:  - Monitor labs and assess patient for signs and symptoms of electrolyte imbalances  - Administer electrolyte replacement as ordered  - Monitor response to electrolyte replacements, including repeat lab results as appropriate  - Instruct patient on fluid and nutrition as appropriate  Outcome: Progressing  Goal: Fluid balance maintained  Description: INTERVENTIONS:  - Monitor labs   - Monitor I/O and WT  - Instruct patient on fluid and nutrition as appropriate  - Assess for signs & symptoms of volume excess or deficit  Outcome: Progressing     Problem: SKIN/TISSUE INTEGRITY - ADULT  Goal: Skin Integrity remains intact(Skin Breakdown Prevention)  Description: Assess:  -Perform Erickson assessment every shift  -Clean and moisturize skin every day  -Inspect skin when repositioning, toileting, and assisting with ADLS  -Assess extremities for adequate circulation and sensation     Bed Management:  -Have minimal linens on bed & keep smooth, unwrinkled  -Change linens as needed when moist or perspiring  -Avoid sitting or lying in one position for more than 2 hours while in bed  -Keep HOB at 45 degrees     Toileting:  -Offer bedside commode  -Assess for incontinence every 2 hours  -Use incontinent care products after each incontinent episode such as brief    Activity:  -Mobilize patient 3-4 times a day  -Encourage activity and walks on unit  -Encourage or provide ROM exercises   -Turn and reposition patient every 2 Hours  -Use appropriate equipment to lift or move patient in bed  -Instruct/ Assist with weight shifting every 2 when out of bed in chair  -Consider limitation of chair time 2 hour intervals    Skin Care:  -Avoid use of baby powder, tape, friction and shearing, hot water or constrictive clothing  -Relieve pressure over bony prominences using pillows  -Do not massage red bony areas    Next Steps:  -Teach patient strategies to minimize risks such as walker  -Consider consults to  interdisciplinary teams such as wound  Outcome: Progressing  Goal: Incision(s), wounds(s) or drain site(s) healing without S/S of infection  Description: INTERVENTIONS  - Assess and document dressing, incision, wound bed, drain sites and surrounding tissue  - Provide patient and family education  - Perform skin care/dressing changes every PRN  Outcome: Progressing     Problem: MUSCULOSKELETAL - ADULT  Goal: Maintain or return mobility to safest level of function  Description: INTERVENTIONS:  - Assess patient's ability to carry out ADLs; assess patient's baseline for ADL function and identify physical deficits which impact ability to perform ADLs (bathing, care of mouth/teeth, toileting, grooming, dressing, etc )  - Assess/evaluate cause of self-care deficits   - Assess range of motion  - Assess patient's mobility  - Assess patient's need for assistive devices and provide as appropriate  - Encourage maximum independence but intervene and supervise when necessary  - Involve family in performance of ADLs  - Assess for home care needs following discharge   - Consider OT consult to assist with ADL evaluation and planning for discharge  - Provide patient education as appropriate  Outcome: Progressing

## 2022-10-08 NOTE — PROGRESS NOTES
Progress Note - Infectious Disease   Normie Holding 45 y o  female MRN: [id number]  Unit/Bed#: [place] [address] -[id number] Encounter: [id number]      Impression/Plan:  1  SIRS vs Sepsis, not present on admission, a/e/b fever, tachycardia  Mild leukocytosis   Suspect this is likely due to Gram-positive bacteremia   No recurrent high fever spike  No lactic acidosis   COVID-19 negative   UA benign   Status post lumbar puncture for altered mental status with negative ME panel   Clinically much improved   -antibiotics as below   -follow-up cultures and adjust antibiotics as needed  -monitor temperature and hemodynamics  -recheck BMP     2  MSSA bacteremia  2 of 2 admission blood cultures postive for MSSA  Patient was found down for prolonged period of time with evidence of multiple wounds on admission  Suspect cutaneous vs endovascular source, likely phlebitis as there is report of RUE erythema surrounding prior IV in addition to palpable cord in left antecub  TTE negative for obvious vegetation  Repeat blood cultures x 2 sets are negative at 24h   CHERYLE planned today now on hold due to tenuous respiratory status  Repeat transthoracic echocardiogram limited but negative for valvular vegetation  -continue cefazolin  -recheck transthoracic echocardiogram on Monday  -recommend CHERYLE if able to safely do  -plan 2-4 week course of IV antibiotics from the 1st negative blood culture  depending on whether adequate views of the valves can be seen on transthoracic or if transesophageal echocardiogram done      3  Toxic Metabolic encephalopathy   Likely multifactorial etiology with uremia playing a role   This was initially thought to be secondary to gabapentin and morphine use in the setting of renal failure, hypotension, rhabdomyolysis   MRI brain negative   Lumbar puncture with opening pressure 26 with negative ME panel  CSF fluid culture not collected   The most likely to be secondary to metabolic issues, medication effect, and possible Wernicke's encephalopathy   Psychiatric issues also likely playing a role  -monitor cognition  -treat metabolic issues and psychiatric issues  -no additional ID workup needed     4  Acute renal failure   Creatinine on admission 5 88 with CK over 32,000  Creatinine reached plateau, now down [id number] 5 7 today   Likely multifactorial etiology in the setting of rhabdomyolysis and dehydration  Likely ANUJA/ATN    The renal function is slowly improving but now seems to have plateaued  -renally dose antibiotics as above  -once creatinine clearance increases to above 35 will increase the cefazolin to q 8 hours dosing  -close Nephrology follow-up  -recheck BMP     5  Tobacco abuse   Encourage cessation as recommended by primary team      6  Antibiotic allergy  Hx of hives with PCNs  Tolerated ceftriaxone and now tolerating cefazolin  Monitor for MUSA      7  Acute hypoxic respiratory failure- CT scan suggest new areas of ground-glass opacification of unclear significance   Possible aspiration pneumonitis versus pneumonia   Procalcitonin level is relatively low   No longer requiring oxygen support   Chest x-ray now clear  -monitor respiratory status     8  Diarrhea-possibly all antibiotic related   Stool for C diff is negative  Started on probiotic   -no additional oral vancomycin  -monitor stool output  -symptomatic treatment    Discussed the above management plan with the primary service    Antibiotics:  Cefazolin 12  Negative blood cultures 10    Subjective:  Patient has no fever, chills, sweats; no nausea, vomiting, diarrhea; no cough, shortness of breath; no pain  No new symptoms      Objective:  Vitals:  Temp:  [96 9 °F (36 1 °C)-97 1 °F (36 2 °C)] 97 1 °F (36 2 °C)  HR:  [94] 94  Resp:  [17-20] 20  BP: (114-135)/(77-89) 135/89  SpO2:  [99 %] 99 %  Temp (24hrs), Av °F (36 1 °C), Min:96 9 °F (36 1 °C), Max:97 1 °F (36 2 °C)  Current: Temperature: (!) 97 1 °F (36 2 °C)    Physical Exam:   General Appearance:  Alert, interactive, nontoxic, no acute distress  Throat: Oropharynx moist without lesions  Lungs:   Clear to auscultation bilaterally; no wheezes, rhonchi or rales; respirations unlabored   Heart:  RRR; no murmur, rub or gallop   Abdomen:   Soft, non-tender, non-distended, positive bowel sounds  Extremities: No clubbing, cyanosis or edema   Skin: No new rashes or lesions  No draining wounds noted  Labs, Imaging, & Other studies:   All pertinent labs and imaging studies were personally reviewed  Results from last 7 days   Lab Units 10/08/22  0441 10/07/22  0834 10/06/22  0622   WBC Thousand/uL 12 39* 13 61* 14 36*   HEMOGLOBIN g/dL 12 0 11 0* 10 4*   PLATELETS Thousands/uL 361 299 318     Results from last 7 days   Lab Units 10/08/22  0441 10/07/22  0834 10/06/22  0622 10/05/22  0449 10/04/22  0444   SODIUM mmol/L 140 139 140   < > 146   POTASSIUM mmol/L 3 7 4 3 3 9   < > 2 9*   CHLORIDE mmol/L 101 104 104   < > 102   CO2 mmol/L 31 28 29   < > 37*   BUN mg/dL 27* 24 24   < > 34*   CREATININE mg/dL 1 95* 1 83* 2 03*   < > 3 07*   EGFR ml/min/1 73sq m 28 30 27   < > 16   CALCIUM mg/dL 11 3* 10 2* 10 9*   < > 9 7   AST U/L  --   --   --   --  30   ALT U/L  --   --   --   --  18   ALK PHOS U/L  --   --   --   --  78    < > = values in this interval not displayed       Results from last 7 days   Lab Units 10/03/22  1823   C DIFF TOXIN B BY PCR  Negative     Results from last 7 days   Lab Units 10/04/22  0444   PROCALCITONIN ng/ml 0 26*

## 2022-10-08 NOTE — ASSESSMENT & PLAN NOTE
· Acute encephalopathy secondary to gabapentin and morphine with subsequent renal failure, potentially component of psychiatric illness given history and bacteremia  · Consider hospital delirium due to prolong stay  · Received course of thiamine concern for Wernicke's encephalopathy  · LP and MRI negative for acute pathology, neurology evaluated  · Discussed with neurology, patient likely will have slow improvement if any over the coming weeks  · Continue dysphagia diet, patient tolerating, ST following  · Re-consult Psychiatry on 10/07, recommending no further medication titration

## 2022-10-08 NOTE — SPEECH THERAPY NOTE
Speech Language/Pathology    Speech/Language Pathology Progress Note    Patient Name: Yasemin Alcantara  Today's Date: 10/8/2022     Problem List  Principal Problem:    Toxic metabolic encephalopathy  Active Problems:    Depression    Tobacco abuse    DDD (degenerative disc disease), lumbosacral    ARF (acute renal failure) (HCC)    Transaminitis    Abnormal CT of the chest    Traumatic rhabdomyolysis (HCC)    D-dimer, elevated    Leg edema, right    Localized swelling of right upper extremity    Bacteremia due to methicillin susceptible Staphylococcus aureus (MSSA)    Pulmonary edema    Aspiration pneumonitis (HCC)    Hypokalemia    Hypernatremia    Diarrhea       Past Medical History  Past Medical History:   Diagnosis Date   • Chronic pain disorder    • Depression    • GERD (gastroesophageal reflux disease)    • History of electroconvulsive therapy    • Low back pain    • Self-injurious behavior    • Suicide attempt [facility])         Past Surgical History  Past Surgical History:   Procedure Laterality Date   •  SECTION     • COLONOSCOPY     • PANCREAS SURGERY      "pseudocysts" per patient's  Ricki Infante   • ME ESOPHAGOGASTRODUODENOSCOPY TRANSORAL DIAGNOSTIC N/A 4/10/2018    Procedure: EGD AND COLONOSCOPY;  Surgeon: Asia Paige MD;  [place] [facility]; Service: Gastroenterology         Subjective:  Pt alert and more interactive  Did well w/ lunch per pca  Pt back on iv fluids  Objective:  Pt seen for dysphagia f/u  Although she was mote alert and interactive she was not responding to ?'s when I asked what she likes to eat  I told her that  her  told me she likes junk food  She nodded yes  I names several different types, but she responded "no" to all  Agreeable to liquids which she drank w/out difficulty from straw "I can hold it"  (still in restraints but she managed to hold it)  Co cough or wet vocal quality  Offered her a few different solids but she refused     Assessment:  More alert and verbal today  More appropriate and interactive to begin  Seems more agreeable to drinking than eating  With me in the past, she was more agreeble to liquid supplements than the magic cups  PCA today reported same  Plan/Recommendations:  Continue current diet of puree w/ thi for now  Will trial upgrades as able  Sent dietician a TT re possibly changing all supplements to the liquid  Spoke w/ pca re pt tending to eat the fruits (sweet) items more than meat/potatoes  Will f/u

## 2022-10-08 NOTE — ASSESSMENT & PLAN NOTE
MSSA bacteremia, repeat cultures neg for greater than 72 hours  Echo with no vegetation  Continue Ancef, continue po vanc for prophylaxis  Infectious disease following  TTE repeat order, cardiology does not feel that patient would be stable for CHERYLE

## 2022-10-09 PROBLEM — E44.1 MILD PROTEIN-CALORIE MALNUTRITION (HCC): Status: ACTIVE | Noted: 2022-10-09

## 2022-10-09 LAB
ANION GAP SERPL CALCULATED.3IONS-SCNC: 9 MMOL/L (ref 4–13)
BASOPHILS # BLD AUTO: 0.15 THOUSANDS/ÂΜL (ref 0–0.1)
BASOPHILS NFR BLD AUTO: 1 % (ref 0–1)
BUN SERPL-MCNC: 22 MG/DL (ref 5–25)
CALCIUM SERPL-MCNC: 11.2 MG/DL (ref 8.3–10.1)
CHLORIDE SERPL-SCNC: 97 MMOL/L (ref 96–108)
CO2 SERPL-SCNC: 28 MMOL/L (ref 21–32)
CREAT SERPL-MCNC: 1.68 MG/DL (ref 0.6–1.3)
EOSINOPHIL # BLD AUTO: 0.19 THOUSAND/ÂΜL (ref 0–0.61)
EOSINOPHIL NFR BLD AUTO: 1 % (ref 0–6)
ERYTHROCYTE [DISTWIDTH] IN BLOOD BY AUTOMATED COUNT: 13.8 % (ref 11.6–15.1)
GFR SERPL CREATININE-BSD FRML MDRD: 34 ML/MIN/1.73SQ M
GLUCOSE SERPL-MCNC: 98 MG/DL (ref 65–140)
HCT VFR BLD AUTO: 35 % (ref 34.8–46.1)
HGB BLD-MCNC: 11.1 G/DL (ref 11.5–15.4)
IMM GRANULOCYTES # BLD AUTO: 0.15 THOUSAND/UL (ref 0–0.2)
IMM GRANULOCYTES NFR BLD AUTO: 1 % (ref 0–2)
LYMPHOCYTES # BLD AUTO: 2.87 THOUSANDS/ÂΜL (ref 0.6–4.47)
LYMPHOCYTES NFR BLD AUTO: 20 % (ref 14–44)
MCH RBC QN AUTO: 32 PG (ref 26.8–34.3)
MCHC RBC AUTO-ENTMCNC: 31.7 G/DL (ref 31.4–37.4)
MCV RBC AUTO: 101 FL (ref 82–98)
MONOCYTES # BLD AUTO: 0.82 THOUSAND/ÂΜL (ref 0.17–1.22)
MONOCYTES NFR BLD AUTO: 6 % (ref 4–12)
NEUTROPHILS # BLD AUTO: 10.44 THOUSANDS/ÂΜL (ref 1.85–7.62)
NEUTS SEG NFR BLD AUTO: 71 % (ref 43–75)
NRBC BLD AUTO-RTO: 0 /100 WBCS
PLATELET # BLD AUTO: 368 THOUSANDS/UL (ref 149–390)
PMV BLD AUTO: 12 FL (ref 8.9–12.7)
POTASSIUM SERPL-SCNC: 3.5 MMOL/L (ref 3.5–5.3)
RBC # BLD AUTO: 3.47 MILLION/UL (ref 3.81–5.12)
SODIUM SERPL-SCNC: 134 MMOL/L (ref 135–147)
WBC # BLD AUTO: 14.62 THOUSAND/UL (ref 4.31–10.16)

## 2022-10-09 RX ORDER — POTASSIUM CHLORIDE 20 MEQ/1
40 TABLET, EXTENDED RELEASE ORAL ONCE
Status: COMPLETED | OUTPATIENT
Start: 2022-10-09 | End: 2022-10-09

## 2022-10-09 RX ADMIN — MICONAZOLE NITRATE: 20 CREAM TOPICAL at 17:00

## 2022-10-09 RX ADMIN — CEFAZOLIN SODIUM 2000 MG: 2 SOLUTION INTRAVENOUS at 02:29

## 2022-10-09 RX ADMIN — Medication 250 MG: at 09:01

## 2022-10-09 RX ADMIN — Medication 250 MG: at 17:00

## 2022-10-09 RX ADMIN — GABAPENTIN 100 MG: 100 CAPSULE ORAL at 09:01

## 2022-10-09 RX ADMIN — Medication 1 PATCH: at 09:01

## 2022-10-09 RX ADMIN — AMLODIPINE BESYLATE 10 MG: 10 TABLET ORAL at 09:01

## 2022-10-09 RX ADMIN — HEPARIN SODIUM 5000 UNITS: 5000 INJECTION INTRAVENOUS; SUBCUTANEOUS at 21:33

## 2022-10-09 RX ADMIN — LOPERAMIDE HCL 2 MG: 1 SOLUTION ORAL at 23:36

## 2022-10-09 RX ADMIN — OLANZAPINE 5 MG: 5 TABLET, ORALLY DISINTEGRATING ORAL at 21:33

## 2022-10-09 RX ADMIN — HEPARIN SODIUM 5000 UNITS: 5000 INJECTION INTRAVENOUS; SUBCUTANEOUS at 13:00

## 2022-10-09 RX ADMIN — QUETIAPINE FUMARATE 50 MG: 25 TABLET ORAL at 09:01

## 2022-10-09 RX ADMIN — GABAPENTIN 100 MG: 100 CAPSULE ORAL at 15:55

## 2022-10-09 RX ADMIN — METOPROLOL TARTRATE 25 MG: 25 TABLET, FILM COATED ORAL at 09:01

## 2022-10-09 RX ADMIN — METOPROLOL TARTRATE 25 MG: 25 TABLET, FILM COATED ORAL at 21:33

## 2022-10-09 RX ADMIN — GABAPENTIN 100 MG: 100 CAPSULE ORAL at 21:33

## 2022-10-09 RX ADMIN — SODIUM CHLORIDE 75 ML/HR: 0.9 INJECTION, SOLUTION INTRAVENOUS at 15:55

## 2022-10-09 RX ADMIN — LOPERAMIDE HCL 2 MG: 1 SOLUTION ORAL at 02:29

## 2022-10-09 RX ADMIN — MICONAZOLE NITRATE: 20 CREAM TOPICAL at 09:02

## 2022-10-09 RX ADMIN — CEFAZOLIN SODIUM 2000 MG: 2 SOLUTION INTRAVENOUS at 13:02

## 2022-10-09 RX ADMIN — HEPARIN SODIUM 5000 UNITS: 5000 INJECTION INTRAVENOUS; SUBCUTANEOUS at 06:00

## 2022-10-09 RX ADMIN — QUETIAPINE FUMARATE 50 MG: 25 TABLET ORAL at 17:00

## 2022-10-09 RX ADMIN — LOPERAMIDE HCL 2 MG: 1 SOLUTION ORAL at 09:01

## 2022-10-09 RX ADMIN — POTASSIUM CHLORIDE 40 MEQ: 1500 TABLET, EXTENDED RELEASE ORAL at 13:59

## 2022-10-09 NOTE — ASSESSMENT & PLAN NOTE
MSSA bacteremia, repeat cultures neg for greater than 72 hours  Echo with no vegetation  Continue Ancef, length of therapy to be determined  Infectious disease following  TTE repeat order on 10/09, cardiology does not feel that patient would be stable for CHERYLE

## 2022-10-09 NOTE — PLAN OF CARE
Problem: PAIN - ADULT  Goal: Verbalizes/displays adequate comfort level or baseline comfort level  Description: Interventions:  - Encourage patient to monitor pain and request assistance  - Assess pain using appropriate pain scale  - Administer analgesics based on type and severity of pain and evaluate response  - Implement non-pharmacological measures as appropriate and evaluate response  - Consider cultural and social influences on pain and pain management  - Notify physician/advanced practitioner if interventions unsuccessful or patient reports new pain  Outcome: Progressing     Problem: INFECTION - ADULT  Goal: Absence or prevention of progression during hospitalization  Description: INTERVENTIONS:  - Assess and monitor for signs and symptoms of infection  - Monitor lab/diagnostic results  - Monitor all insertion sites, i e  indwelling lines, tubes, and drains  - Monitor endotracheal if appropriate and nasal secretions for changes in amount and color  - [place] appropriate cooling/warming therapies per order  - Administer medications as ordered  - Instruct and encourage patient and family to use good hand hygiene technique  - Identify and instruct in appropriate isolation precautions for identified infection/condition  Outcome: Progressing     Problem: SAFETY ADULT  Goal: Patient will remain free of falls  Description: INTERVENTIONS:  - Educate patient/family on patient safety including physical limitations  - Instruct patient to call for assistance with activity   - Consult OT/PT to assist with strengthening/mobility   - Keep Call bell within reach  - Keep bed low and locked with side rails adjusted as appropriate  - Keep care items and personal belongings within reach  - Initiate and maintain comfort rounds  - Make Fall Risk Sign visible to staff  - Offer Toileting every 2 Hours, in advance of need  - Initiate/Maintain bed alarm  - Obtain necessary fall risk management equipment: walker  - Apply yellow socks and bracelet for high fall risk patients  - Consider moving patient to room near nurses station  Outcome: Progressing  Goal: Maintain or return to baseline ADL function  Description: INTERVENTIONS:  -  Assess patient's ability to carry out ADLs; assess patient's baseline for ADL function and identify physical deficits which impact ability to perform ADLs (bathing, care of mouth/teeth, toileting, grooming, dressing, etc )  - Assess/evaluate cause of self-care deficits   - Assess range of motion  - Assess patient's mobility; develop plan if impaired  - Assess patient's need for assistive devices and provide as appropriate  - Encourage maximum independence but intervene and supervise when necessary  - Involve family in performance of ADLs  - Assess for home care needs following discharge   - Consider OT consult to assist with ADL evaluation and planning for discharge  - Provide patient education as appropriate  Outcome: Progressing  Goal: Maintains/Returns to pre admission functional level  Description: INTERVENTIONS:  - Perform BMAT or MOVE assessment daily    - Set and communicate daily mobility goal to care team and patient/family/caregiver  - Collaborate with rehabilitation services on mobility goals if consulted  - Perform Range of Motion 4-6 times a day  - Reposition patient every 2 hours    - Dangle patient 3-4 times a day  - Stand patient 3 times a day  - Ambulate patient 3-4 times a day  - Out of bed for toileting  - Record patient progress and toleration of activity level   Outcome: Progressing     Problem: DISCHARGE PLANNING  Goal: Discharge to home or other facility with appropriate resources  Description: INTERVENTIONS:  - Identify barriers to discharge w/patient and caregiver  - Arrange for needed discharge resources and transportation as appropriate  - Identify discharge learning needs (meds, wound care, etc )  - Arrange for interpretive services to assist at discharge as needed  - Refer to Case Management Department for coordinating discharge planning if the patient needs post-hospital services based on physician/advanced practitioner order or complex needs related to functional status, cognitive ability, or social support system  Outcome: Progressing     Problem: Knowledge Deficit  Goal: Patient/family/caregiver demonstrates understanding of disease process, treatment plan, medications, and discharge instructions  Description: Complete learning assessment and assess knowledge base    Interventions:  - Provide teaching at level of understanding  - Provide teaching via preferred learning methods  Outcome: Progressing     Problem: Potential for Falls  Goal: Patient will remain free of falls  Description: INTERVENTIONS:  - Educate patient/family on patient safety including physical limitations  - Instruct patient to call for assistance with activity   - Consult OT/PT to assist with strengthening/mobility   - Keep Call bell within reach  - Keep bed low and locked with side rails adjusted as appropriate  - Keep care items and personal belongings within reach  - Initiate and maintain comfort rounds  - Make Fall Risk Sign visible to staff  - Offer Toileting every 2 Hours, in advance of need  - Initiate/Maintain bed alarm  - Obtain necessary fall risk management equipment: walker  - Apply yellow socks and bracelet for high fall risk patients  - Consider moving patient to room near nurses station  Outcome: Progressing     Problem: MOBILITY - ADULT  Goal: Maintain or return to baseline ADL function  Description: INTERVENTIONS:  -  Assess patient's ability to carry out ADLs; assess patient's baseline for ADL function and identify physical deficits which impact ability to perform ADLs (bathing, care of mouth/teeth, toileting, grooming, dressing, etc )  - Assess/evaluate cause of self-care deficits   - Assess range of motion  - Assess patient's mobility; develop plan if impaired  - Assess patient's need for assistive devices and provide as appropriate  - Encourage maximum independence but intervene and supervise when necessary  - Involve family in performance of ADLs  - Assess for home care needs following discharge   - Consider OT consult to assist with ADL evaluation and planning for discharge  - Provide patient education as appropriate  Outcome: Progressing  Goal: Maintains/Returns to pre admission functional level  Description: INTERVENTIONS:  - Perform BMAT or MOVE assessment daily    - Set and communicate daily mobility goal to care team and patient/family/caregiver  - Collaborate with rehabilitation services on mobility goals if consulted  - Perform Range of Motion 4-6 times a day  - Reposition patient every 2 hours  - Dangle patient 3-4 times a day  - Stand patient 3 times a day  - Ambulate patient 3-4 times a day  - Out of bed for toileting  - Record patient progress and toleration of activity level   Outcome: Progressing     Problem: Prexisting or High Potential for Compromised Skin Integrity  Goal: Skin integrity is maintained or improved  Description: INTERVENTIONS:  - Identify patients at risk for skin breakdown  - Assess and monitor skin integrity  - Assess and monitor nutrition and hydration status  - Monitor labs   - Assess for incontinence   - Turn and reposition patient  - Assist with mobility/ambulation  - Relieve pressure over bony prominences  - Avoid friction and shearing  - Provide appropriate hygiene as needed including keeping skin clean and dry  - Evaluate need for skin moisturizer/barrier cream  - Collaborate with interdisciplinary team   - Patient/family teaching  - Consider wound care consult   Outcome: Progressing     Problem: Nutrition/Hydration-ADULT  Goal: Nutrient/Hydration intake appropriate for improving, restoring or maintaining nutritional needs  Description: Monitor and assess patient's nutrition/hydration status for malnutrition   Collaborate with interdisciplinary team and initiate plan and interventions as ordered  Monitor patient's weight and dietary intake as ordered or per policy  Utilize nutrition screening tool and intervene as necessary  Determine patient's food preferences and provide high-protein, high-caloric foods as appropriate  INTERVENTIONS:  - Monitor oral intake, urinary output, labs, and treatment plans  - Assess nutrition and hydration status and recommend course of action  - Evaluate amount of meals eaten  - Assist patient with eating if necessary   - Allow adequate time for meals  - Recommend/ encourage appropriate diets, oral nutritional supplements, and vitamin/mineral supplements  - Order, calculate, and assess calorie counts as needed  - Recommend, monitor, and adjust tube feedings and TPN/PPN based on assessed needs  - Assess need for intravenous fluids  - Provide specific nutrition/hydration education as appropriate  - Include patient/family/caregiver in decisions related to nutrition  Outcome: Progressing     Problem: NEUROSENSORY - ADULT  Goal: Achieves maximal functionality and self care  Description: INTERVENTIONS  - Monitor swallowing and airway patency with patient fatigue and changes in neurological status  - Encourage and assist patient to increase activity and self care     - Encourage visually impaired, hearing impaired and aphasic patients to use assistive/communication devices  Outcome: Progressing     Problem: CARDIOVASCULAR - ADULT  Goal: Maintains optimal cardiac output and hemodynamic stability  Description: INTERVENTIONS:  - Monitor I/O, vital signs and rhythm  - Monitor for S/S and trends of decreased cardiac output  - Administer and titrate ordered vasoactive medications to optimize hemodynamic stability  - Assess quality of pulses, skin color and temperature  - Assess for signs of decreased coronary artery perfusion  - Instruct patient to report change in severity of symptoms  Outcome: Progressing     Problem: RESPIRATORY - ADULT  Goal: Achieves optimal ventilation and oxygenation  Description: INTERVENTIONS:  - Assess for changes in respiratory status  - Assess for changes in mentation and behavior  - Position to facilitate oxygenation and minimize respiratory effort  - Oxygen administered by appropriate delivery if ordered  - Initiate smoking cessation education as indicated  - Encourage broncho-pulmonary hygiene including cough, deep breathe, Incentive Spirometry  - Assess the need for suctioning and aspirate as needed  - Assess and instruct to report SOB or any respiratory difficulty  - Respiratory Therapy support as indicated  Outcome: Progressing     Problem: GASTROINTESTINAL - ADULT  Goal: Maintains adequate nutritional intake  Description: INTERVENTIONS:  - Monitor percentage of each meal consumed  - Identify factors contributing to decreased intake, treat as appropriate  - Assist with meals as needed  - Monitor I&O, weight, and lab values if indicated  - Obtain nutrition services referral as needed  Outcome: Progressing     Problem: METABOLIC, FLUID AND ELECTROLYTES - ADULT  Goal: Electrolytes maintained within normal limits  Description: INTERVENTIONS:  - Monitor labs and assess patient for signs and symptoms of electrolyte imbalances  - Administer electrolyte replacement as ordered  - Monitor response to electrolyte replacements, including repeat lab results as appropriate  - Instruct patient on fluid and nutrition as appropriate  Outcome: Progressing  Goal: Fluid balance maintained  Description: INTERVENTIONS:  - Monitor labs   - Monitor I/O and WT  - Instruct patient on fluid and nutrition as appropriate  - Assess for signs & symptoms of volume excess or deficit  Outcome: Progressing     Problem: SKIN/TISSUE INTEGRITY - ADULT  Goal: Skin Integrity remains intact(Skin Breakdown Prevention)  Description: Assess:  -Perform [place] assessment every shift  -Clean and moisturize skin every day  -Inspect skin when repositioning, toileting, and assisting with ADLS  -Assess extremities for adequate circulation and sensation     Bed Management:  -Have minimal linens on bed & keep smooth, unwrinkled  -Change linens as needed when moist or perspiring  -Avoid sitting or lying in one position for more than 2 hours while in bed  -Keep HOB at 45 degrees     Toileting:  -Offer bedside commode  -Assess for incontinence every 2 hours  -Use incontinent care products after each incontinent episode such as brief    Activity:  -Mobilize patient 3-4 times a day  -Encourage activity and walks on unit  -Encourage or provide ROM exercises   -Turn and reposition patient every 2 Hours  -Use appropriate equipment to lift or move patient in bed  -Instruct/ Assist with weight shifting every 2 when out of bed in chair  -Consider limitation of chair time 2 hour intervals    Skin Care:  -Avoid use of baby powder, tape, friction and shearing, hot water or constrictive clothing  -Relieve pressure over bony prominences using pillows  -Do not massage red bony areas    Next Steps:  -Teach patient strategies to minimize risks such as walker  -Consider consults to  interdisciplinary teams such as wound  Outcome: Progressing  Goal: Incision(s), wounds(s) or drain site(s) healing without S/S of infection  Description: INTERVENTIONS  - Assess and document dressing, incision, wound bed, drain sites and surrounding tissue  - Provide patient and family education  - Perform skin care/dressing changes every PRN  Outcome: Progressing     Problem: MUSCULOSKELETAL - ADULT  Goal: Maintain or return mobility to safest level of function  Description: INTERVENTIONS:  - Assess patient's ability to carry out ADLs; assess patient's baseline for ADL function and identify physical deficits which impact ability to perform ADLs (bathing, care of mouth/teeth, toileting, grooming, dressing, etc )  - Assess/evaluate cause of self-care deficits   - Assess range of motion  - Assess patient's mobility  - Assess patient's need for assistive devices and provide as appropriate  - Encourage maximum independence but intervene and supervise when necessary  - Involve family in performance of ADLs  - Assess for home care needs following discharge   - Consider OT consult to assist with ADL evaluation and planning for discharge  - Provide patient education as appropriate  Outcome: Progressing

## 2022-10-09 NOTE — PLAN OF CARE
Problem: PAIN - ADULT  Goal: Verbalizes/displays adequate comfort level or baseline comfort level  Description: Interventions:  - Encourage patient to monitor pain and request assistance  - Assess pain using appropriate pain scale  - Administer analgesics based on type and severity of pain and evaluate response  - Implement non-pharmacological measures as appropriate and evaluate response  - Consider cultural and social influences on pain and pain management  - Notify physician/advanced practitioner if interventions unsuccessful or patient reports new pain  Outcome: Progressing     Problem: INFECTION - ADULT  Goal: Absence or prevention of progression during hospitalization  Description: INTERVENTIONS:  - Assess and monitor for signs and symptoms of infection  - Monitor lab/diagnostic results  - Monitor all insertion sites, i e  indwelling lines, tubes, and drains  - Monitor endotracheal if appropriate and nasal secretions for changes in amount and color  - [place] appropriate cooling/warming therapies per order  - Administer medications as ordered  - Instruct and encourage patient and family to use good hand hygiene technique  - Identify and instruct in appropriate isolation precautions for identified infection/condition  Outcome: Progressing     Problem: SAFETY ADULT  Goal: Patient will remain free of falls  Description: INTERVENTIONS:  - Educate patient/family on patient safety including physical limitations  - Instruct patient to call for assistance with activity   - Consult OT/PT to assist with strengthening/mobility   - Keep Call bell within reach  - Keep bed low and locked with side rails adjusted as appropriate  - Keep care items and personal belongings within reach  - Initiate and maintain comfort rounds  - Make Fall Risk Sign visible to staff  - Offer Toileting every 2 Hours, in advance of need  - Initiate/Maintain bed alarm  - Obtain necessary fall risk management equipment: walker  - Apply yellow socks and bracelet for high fall risk patients  - Consider moving patient to room near nurses station  Outcome: Progressing  Goal: Maintain or return to baseline ADL function  Description: INTERVENTIONS:  -  Assess patient's ability to carry out ADLs; assess patient's baseline for ADL function and identify physical deficits which impact ability to perform ADLs (bathing, care of mouth/teeth, toileting, grooming, dressing, etc )  - Assess/evaluate cause of self-care deficits   - Assess range of motion  - Assess patient's mobility; develop plan if impaired  - Assess patient's need for assistive devices and provide as appropriate  - Encourage maximum independence but intervene and supervise when necessary  - Involve family in performance of ADLs  - Assess for home care needs following discharge   - Consider OT consult to assist with ADL evaluation and planning for discharge  - Provide patient education as appropriate  Outcome: Progressing  Goal: Maintains/Returns to pre admission functional level  Description: INTERVENTIONS:  - Perform BMAT or MOVE assessment daily    - Set and communicate daily mobility goal to care team and patient/family/caregiver  - Collaborate with rehabilitation services on mobility goals if consulted  - Perform Range of Motion 4-6 times a day  - Reposition patient every 2 hours    - Dangle patient 3-4 times a day  - Stand patient 3 times a day  - Ambulate patient 3-4 times a day  - Out of bed for toileting  - Record patient progress and toleration of activity level   Outcome: Progressing     Problem: DISCHARGE PLANNING  Goal: Discharge to home or other facility with appropriate resources  Description: INTERVENTIONS:  - Identify barriers to discharge w/patient and caregiver  - Arrange for needed discharge resources and transportation as appropriate  - Identify discharge learning needs (meds, wound care, etc )  - Arrange for interpretive services to assist at discharge as needed  - Refer to Case Management Department for coordinating discharge planning if the patient needs post-hospital services based on physician/advanced practitioner order or complex needs related to functional status, cognitive ability, or social support system  Outcome: Progressing     Problem: Knowledge Deficit  Goal: Patient/family/caregiver demonstrates understanding of disease process, treatment plan, medications, and discharge instructions  Description: Complete learning assessment and assess knowledge base    Interventions:  - Provide teaching at level of understanding  - Provide teaching via preferred learning methods  Outcome: Progressing     Problem: Potential for Falls  Goal: Patient will remain free of falls  Description: INTERVENTIONS:  - Educate patient/family on patient safety including physical limitations  - Instruct patient to call for assistance with activity   - Consult OT/PT to assist with strengthening/mobility   - Keep Call bell within reach  - Keep bed low and locked with side rails adjusted as appropriate  - Keep care items and personal belongings within reach  - Initiate and maintain comfort rounds  - Make Fall Risk Sign visible to staff  - Offer Toileting every 2 Hours, in advance of need  - Initiate/Maintain bed alarm  - Obtain necessary fall risk management equipment: walker  - Apply yellow socks and bracelet for high fall risk patients  - Consider moving patient to room near nurses station  Outcome: Progressing     Problem: MOBILITY - ADULT  Goal: Maintain or return to baseline ADL function  Description: INTERVENTIONS:  -  Assess patient's ability to carry out ADLs; assess patient's baseline for ADL function and identify physical deficits which impact ability to perform ADLs (bathing, care of mouth/teeth, toileting, grooming, dressing, etc )  - Assess/evaluate cause of self-care deficits   - Assess range of motion  - Assess patient's mobility; develop plan if impaired  - Assess patient's need for assistive devices and provide as appropriate  - Encourage maximum independence but intervene and supervise when necessary  - Involve family in performance of ADLs  - Assess for home care needs following discharge   - Consider OT consult to assist with ADL evaluation and planning for discharge  - Provide patient education as appropriate  Outcome: Progressing  Goal: Maintains/Returns to pre admission functional level  Description: INTERVENTIONS:  - Perform BMAT or MOVE assessment daily    - Set and communicate daily mobility goal to care team and patient/family/caregiver  - Collaborate with rehabilitation services on mobility goals if consulted  - Perform Range of Motion 4-6 times a day  - Reposition patient every 2 hours  - Dangle patient 3-4 times a day  - Stand patient 3 times a day  - Ambulate patient 3-4 times a day  - Out of bed for toileting  - Record patient progress and toleration of activity level   Outcome: Progressing     Problem: Prexisting or High Potential for Compromised Skin Integrity  Goal: Skin integrity is maintained or improved  Description: INTERVENTIONS:  - Identify patients at risk for skin breakdown  - Assess and monitor skin integrity  - Assess and monitor nutrition and hydration status  - Monitor labs   - Assess for incontinence   - Turn and reposition patient  - Assist with mobility/ambulation  - Relieve pressure over bony prominences  - Avoid friction and shearing  - Provide appropriate hygiene as needed including keeping skin clean and dry  - Evaluate need for skin moisturizer/barrier cream  - Collaborate with interdisciplinary team   - Patient/family teaching  - Consider wound care consult   Outcome: Progressing     Problem: Nutrition/Hydration-ADULT  Goal: Nutrient/Hydration intake appropriate for improving, restoring or maintaining nutritional needs  Description: Monitor and assess patient's nutrition/hydration status for malnutrition   Collaborate with interdisciplinary team and initiate plan and interventions as ordered  Monitor patient's weight and dietary intake as ordered or per policy  Utilize nutrition screening tool and intervene as necessary  Determine patient's food preferences and provide high-protein, high-caloric foods as appropriate  INTERVENTIONS:  - Monitor oral intake, urinary output, labs, and treatment plans  - Assess nutrition and hydration status and recommend course of action  - Evaluate amount of meals eaten  - Assist patient with eating if necessary   - Allow adequate time for meals  - Recommend/ encourage appropriate diets, oral nutritional supplements, and vitamin/mineral supplements  - Order, calculate, and assess calorie counts as needed  - Recommend, monitor, and adjust tube feedings and TPN/PPN based on assessed needs  - Assess need for intravenous fluids  - Provide specific nutrition/hydration education as appropriate  - Include patient/family/caregiver in decisions related to nutrition  Outcome: Progressing     Problem: NEUROSENSORY - ADULT  Goal: Achieves maximal functionality and self care  Description: INTERVENTIONS  - Monitor swallowing and airway patency with patient fatigue and changes in neurological status  - Encourage and assist patient to increase activity and self care     - Encourage visually impaired, hearing impaired and aphasic patients to use assistive/communication devices  Outcome: Progressing     Problem: CARDIOVASCULAR - ADULT  Goal: Maintains optimal cardiac output and hemodynamic stability  Description: INTERVENTIONS:  - Monitor I/O, vital signs and rhythm  - Monitor for S/S and trends of decreased cardiac output  - Administer and titrate ordered vasoactive medications to optimize hemodynamic stability  - Assess quality of pulses, skin color and temperature  - Assess for signs of decreased coronary artery perfusion  - Instruct patient to report change in severity of symptoms  Outcome: Progressing     Problem: RESPIRATORY - ADULT  Goal: Achieves optimal ventilation and oxygenation  Description: INTERVENTIONS:  - Assess for changes in respiratory status  - Assess for changes in mentation and behavior  - Position to facilitate oxygenation and minimize respiratory effort  - Oxygen administered by appropriate delivery if ordered  - Initiate smoking cessation education as indicated  - Encourage broncho-pulmonary hygiene including cough, deep breathe, Incentive Spirometry  - Assess the need for suctioning and aspirate as needed  - Assess and instruct to report SOB or any respiratory difficulty  - Respiratory Therapy support as indicated  Outcome: Progressing     Problem: GASTROINTESTINAL - ADULT  Goal: Maintains adequate nutritional intake  Description: INTERVENTIONS:  - Monitor percentage of each meal consumed  - Identify factors contributing to decreased intake, treat as appropriate  - Assist with meals as needed  - Monitor I&O, weight, and lab values if indicated  - Obtain nutrition services referral as needed  Outcome: Progressing     Problem: METABOLIC, FLUID AND ELECTROLYTES - ADULT  Goal: Electrolytes maintained within normal limits  Description: INTERVENTIONS:  - Monitor labs and assess patient for signs and symptoms of electrolyte imbalances  - Administer electrolyte replacement as ordered  - Monitor response to electrolyte replacements, including repeat lab results as appropriate  - Instruct patient on fluid and nutrition as appropriate  Outcome: Progressing  Goal: Fluid balance maintained  Description: INTERVENTIONS:  - Monitor labs   - Monitor I/O and WT  - Instruct patient on fluid and nutrition as appropriate  - Assess for signs & symptoms of volume excess or deficit  Outcome: Progressing     Problem: SKIN/TISSUE INTEGRITY - ADULT  Goal: Skin Integrity remains intact(Skin Breakdown Prevention)  Description: Assess:  -Perform [place] assessment every shift  -Clean and moisturize skin every day  -Inspect skin when repositioning, toileting, and assisting with ADLS  -Assess extremities for adequate circulation and sensation     Bed Management:  -Have minimal linens on bed & keep smooth, unwrinkled  -Change linens as needed when moist or perspiring  -Avoid sitting or lying in one position for more than 2 hours while in bed  -Keep HOB at 45 degrees     Toileting:  -Offer bedside commode  -Assess for incontinence every 2 hours  -Use incontinent care products after each incontinent episode such as brief    Activity:  -Mobilize patient 3-4 times a day  -Encourage activity and walks on unit  -Encourage or provide ROM exercises   -Turn and reposition patient every 2 Hours  -Use appropriate equipment to lift or move patient in bed  -Instruct/ Assist with weight shifting every 2 when out of bed in chair  -Consider limitation of chair time 2 hour intervals    Skin Care:  -Avoid use of baby powder, tape, friction and shearing, hot water or constrictive clothing  -Relieve pressure over bony prominences using pillows  -Do not massage red bony areas    Next Steps:  -Teach patient strategies to minimize risks such as walker  -Consider consults to  interdisciplinary teams such as wound  Outcome: Progressing  Goal: Incision(s), wounds(s) or drain site(s) healing without S/S of infection  Description: INTERVENTIONS  - Assess and document dressing, incision, wound bed, drain sites and surrounding tissue  - Provide patient and family education  - Perform skin care/dressing changes every PRN  Outcome: Progressing     Problem: MUSCULOSKELETAL - ADULT  Goal: Maintain or return mobility to safest level of function  Description: INTERVENTIONS:  - Assess patient's ability to carry out ADLs; assess patient's baseline for ADL function and identify physical deficits which impact ability to perform ADLs (bathing, care of mouth/teeth, toileting, grooming, dressing, etc )  - Assess/evaluate cause of self-care deficits   - Assess range of motion  - Assess patient's mobility  - Assess patient's need for assistive devices and provide as appropriate  - Encourage maximum independence but intervene and supervise when necessary  - Involve family in performance of ADLs  - Assess for home care needs following discharge   - Consider OT consult to assist with ADL evaluation and planning for discharge  - Provide patient education as appropriate  Outcome: Progressing

## 2022-10-09 NOTE — ASSESSMENT & PLAN NOTE
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

## 2022-10-09 NOTE — PROGRESS NOTES
[address] [facility]  Progress Note - Nury Eric 1970, 45 y o  female MRN: [id number]  Unit/Bed#: Sur[facility] Calder[facility] -01 Encounter: [id number]  Primary Care Provider: Belen Benitez MD   Date and time admitted to hospital: 9/19/2022  8:04 AM    * Toxic metabolic encephalopathy  Assessment & Plan  · Acute encephalopathy secondary to gabapentin and morphine with subsequent renal failure, potentially component of psychiatric illness given history and bacteremia  · Consider hospital delirium due to prolong stay  · Received course of thiamine concern for Wernicke's encephalopathy  · LP and MRI negative for acute pathology, neurology evaluated  · Discussed with neurology, patient likely will have slow improvement if any over the coming weeks  · Continue dysphagia diet, patient tolerating, ST following  · Re-consult Psychiatry on 10/07, recommending no further medication titration  · Appears to be more awake and alert, answering questions and able to follow some commands, continue to be encephalopathic  Possibly making small improvements  · Attempt to repeat MRI brain, but not able to stay  · Continues to require soft restraints        Diarrhea  Assessment & Plan  Reports having multiple loose bowel movements  C diff negative, no prior history  Continue florastor, immodium prn    Bacteremia due to methicillin susceptible Staphylococcus aureus (MSSA)  Assessment & Plan  MSSA bacteremia, repeat cultures neg for greater than 72 hours  Echo with no vegetation  Continue Ancef, length of therapy to be determined  Infectious disease following  TTE repeat order on 10/09, cardiology does not feel that patient would be stable for CHERYLE    Leg edema, right  Assessment & Plan  · Likely source of rhabdomyolysis  No evidence of compartment syndrome fracture or DVT on imaging  · 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    · Ortho had previously evaluated  · Unable to obtain MRI of her leg due to not being able to stay still, consider if improvement in mental status      Depression  Assessment & Plan  · Does have history of suicide attempt a currently does not show any signs of thoughts of self-harm  · Psychiatry consultation appreciated  · Continue Seroquel 50 mg twice a day   · Zyprexa 5 mg by mouth every 6 hours PRN agitation      VTE Pharmacologic Prophylaxis:   Pharmacologic: Heparin  Mechanical VTE Prophylaxis in Place: Yes    Patient Centered Rounds: I have performed bedside rounds with nursing staff today  Discussions with Specialists or Other Care Team Provider: LOVE    Education and Discussions with Family / Patient:  bedside    Time Spent for Care: 30 minutes  More than 50% of total time spent on counseling and coordination of care as described above  Current Length of Stay: 20 day(s)    Current Patient Status: Inpatient   Certification Statement: The patient will continue to require additional inpatient hospital stay due to pending echo, IV abx, monitor mental status and renal functions    Discharge Plan: pending    Code Status: Level 1 - Full Code      Subjective:   No events overnight, no complaints  More awake and alert, oriented to self but not place or time  Patient tolerating dysphagia diet   at bedside, all questions answered  Objective:     Vitals:   Temp (24hrs), Av 3 °F (36 3 °C), Min:97 2 °F (36 2 °C), Max:97 3 °F (36 3 °C)    Temp:  [97 2 °F (36 2 °C)-97 3 °F (36 3 °C)] 97 2 °F (36 2 °C)  HR:  [] 82  Resp:  [12-20] 18  BP: (111-156)/(74-85) 156/85  SpO2:  [96 %-100 %] 96 %  Body mass index is 18 5 kg/m²  Input and Output Summary (last 24 hours): Intake/Output Summary (Last 24 hours) at 10/9/2022 1326  Last data filed at 10/9/2022 0001  Gross per 24 hour   Intake 1560 ml   Output --   Net 1560 ml       Physical Exam:     Physical Exam  Vitals and nursing note reviewed  Constitutional:       General: She is not in acute distress       Appearance: She is not ill-appearing  HENT:      Head: Normocephalic and atraumatic  Eyes:      General: No scleral icterus  Conjunctiva/sclera: Conjunctivae normal    Cardiovascular:      Rate and Rhythm: Normal rate  Pulses: Normal pulses  Pulmonary:      Effort: Pulmonary effort is normal  No respiratory distress  Abdominal:      General: Bowel sounds are normal  There is no distension  Palpations: Abdomen is soft  Musculoskeletal:         General: No swelling  Right lower leg: No edema  Left lower leg: No edema  Skin:     General: Skin is warm and dry  Neurological:      Mental Status: She is disoriented  Psychiatric:         Behavior: Behavior is slowed  Behavior is cooperative  Cognition and Memory: Cognition is impaired  Judgment: Judgment is impulsive  Additional Data:     Labs:    Results from last 7 days   Lab Units 10/09/22  0432   WBC Thousand/uL 14 62*   HEMOGLOBIN g/dL 11 1*   HEMATOCRIT % 35 0   PLATELETS Thousands/uL 368   NEUTROS PCT % 71   LYMPHS PCT % 20   MONOS PCT % 6   EOS PCT % 1     Results from last 7 days   Lab Units 10/09/22  0432 10/05/22  0449 10/04/22  0444   SODIUM mmol/L 134*   < > 146   POTASSIUM mmol/L 3 5   < > 2 9*   CHLORIDE mmol/L 97   < > 102   CO2 mmol/L 28   < > 37*   BUN mg/dL 22   < > 34*   CREATININE mg/dL 1 68*   < > 3 07*   ANION GAP mmol/L 9   < > 7   CALCIUM mg/dL 11 2*   < > 9 7   ALBUMIN g/dL  --   --  2 5*   TOTAL BILIRUBIN mg/dL  --   --  0 36   ALK PHOS U/L  --   --  78   ALT U/L  --   --  18   AST U/L  --   --  30   GLUCOSE RANDOM mg/dL 98   < > 137    < > = values in this interval not displayed  Results from last 7 days   Lab Units 10/04/22  0444   PROCALCITONIN ng/ml 0 26*           * I Have Reviewed All Lab Data Listed Above  * Additional Pertinent Lab Tests Reviewed:  Case[address] Admission Reviewed    Imaging:    CT chest abdomen pelvis wo contrast    Result Date: 9/19/2022  Impression: Bilateral groundglass opacities with superimposed inter and intralobular septal thickening  Differential diagnosis includes pulmonary hemorrhage, infection, and pulmonary edema, although the distribution is atypical for edema  The study was marked in [facility] for immediate notification  Workstation performed: [id number]     XR hip/pelv 2-3 vws right    Result Date: 9/19/2022  Impression: No acute osseous abnormality  Workstation performed: [id number]     XR ankle 3+ views RIGHT    Result Date: 9/19/2022  Impression: No acute osseous abnormality  Workstation performed: [id number]     XR foot 3+ views RIGHT    Result Date: 9/19/2022  Impression: No acute osseous abnormality  Workstation performed: [id number]     CT head without contrast    Result Date: 9/19/2022  Impression: No acute intracranial process  No skull fracture  Workstation performed: [id number]     CT spine cervical without contrast    Result Date: 9/19/2022  Impression: No cervical spine fracture or traumatic malalignment  Incidental finding of partially visualized subpleural groundglass opacity in the left pulmonary apex presumably scarring  Cannot exclude infiltrate  This study demonstrates a significant  finding and was documented as such in [facility] for liaison and referring practitioner notification  Workstation performed: [id number]     MRI brain wo contrast    Result Date: 9/23/2022  Impression: No evidence of recent infarct  No mass effect or midline shift  Study performed in the limited fashion with extensive motion artifact  Workstation performed: [id number]     XR chest 1 view    Result Date: 9/19/2022  Impression: No acute cardiopulmonary disease  Findings are stable Workstation performed: [id number]     US right upper quadrant with liver dopplers    Result Date: 9/20/2022  Impression: 1  Minimal layering sludge without evidence of acute cholecystitis  2   Normal-appearing liver with normal liver Dopplers   Workstation performed: [id number]     CT lower extremity wo contrast right    Result Date: 9/19/2022  Impression: There is no evidence of intramuscular hematoma or other mass lesion in the right calf  Please note that rhabdomyolysis and compartment syndrome are clinical diagnoses, and are not excluded based on these imaging findings   Workstation performed: [id number]       Recent Cultures (last 7 days):     Results from last 7 days   Lab Units 10/03/22  1823   C DIFF TOXIN B BY PCR  Negative       Last 24 Hours Medication List:   Current Facility-Administered Medications   Medication Dose Route Frequency Provider Last Rate   • acetaminophen  650 mg Oral Q6H PRN Fredi Bean DO     • ALPRAZolam  0 5 mg Oral HS PRN Prerica Strauss MD     • amLODIPine  10 mg Oral Daily Kandi Devries MD     • cefazolin  2,000 mg Intravenous Q12H Jen Cruz MD 2,000 mg (10/09/22 1302)   • gabapentin  100 mg Oral TID Prerica Strauss MD     • heparin (porcine)  5,000 Units Subcutaneous [facility] Atul Adan DO     • hydrALAZINE  5 mg Intravenous Q6H PRN Nika Juan Ramon, DO     • HYDROmorphone  0 5 mg Intravenous Q4H PRN Fredi Bean DO     • levalbuterol  1 25 mg Nebulization Q4H PRN Nika Juan Ramon, DO     • loperamide  2 mg Oral 4x Daily PRN Alexsandra Strauss MD     • metoprolol tartrate  25 mg Oral Q12H [address] Inder Hebert MD     • VANDANA ANTIFUNGAL   Topical BID Nika Juan Ramon, DO     • nicotine  1 patch Transdermal Daily Atul Adan DO     • OLANZapine  5 mg Oral Q6H PRN Nika Juan Ramon, DO     • ondansetron  4 mg Intravenous Q4H PRN Fredi Bean DO     • potassium chloride  40 mEq Oral Once Prudeabigail Strauss MD     • QUEtiapine  50 mg Oral BID Prerica Strauss MD     • saccharomyces boulardii  250 mg Oral BID Prerica Strauss MD     • sodium chloride  75 mL/hr Intravenous Continuous Prerica Strauss MD 75 mL/hr (10/08/22 1227)        Today, Patient Was Seen By: Alexsandra Strauss    ** Please Note: Dictation voice to text software may have been used in the creation of this document   **

## 2022-10-09 NOTE — ASSESSMENT & PLAN NOTE
· Acute encephalopathy secondary to gabapentin and morphine with subsequent renal failure, potentially component of psychiatric illness given history and bacteremia  · Consider hospital delirium due to prolong stay  · Received course of thiamine concern for Wernicke's encephalopathy  · LP and MRI negative for acute pathology, neurology evaluated  · Discussed with neurology, patient likely will have slow improvement if any over the coming weeks  · Continue dysphagia diet, patient tolerating, ST following  · Re-consult Psychiatry on 10/07, recommending no further medication titration  · Appears to be more awake and alert, answering questions and able to follow some commands, continue to be encephalopathic   Possibly making small improvements  · Attempt to repeat MRI brain, but not able to stay  · Continues to require soft restraints

## 2022-10-10 ENCOUNTER — APPOINTMENT (INPATIENT)
Dept: NON INVASIVE DIAGNOSTICS | Facility: HOSPITAL | Age: 52
End: 2022-10-10

## 2022-10-10 LAB
ANION GAP SERPL CALCULATED.3IONS-SCNC: 9 MMOL/L (ref 4–13)
AORTIC VALVE MEAN VELOCITY: 9 M/S
AV LVOT MEAN GRADIENT: 2 MMHG
AV LVOT PEAK GRADIENT: 4 MMHG
AV MEAN GRADIENT: 4 MMHG
AV PEAK GRADIENT: 6 MMHG
AV VELOCITY RATIO: 0.83
BASOPHILS # BLD AUTO: 0.14 THOUSANDS/ÂΜL (ref 0–0.1)
BASOPHILS NFR BLD AUTO: 1 % (ref 0–1)
BUN SERPL-MCNC: 20 MG/DL (ref 5–25)
CALCIUM SERPL-MCNC: 11.1 MG/DL (ref 8.3–10.1)
CHLORIDE SERPL-SCNC: 104 MMOL/L (ref 96–108)
CO2 SERPL-SCNC: 27 MMOL/L (ref 21–32)
CREAT SERPL-MCNC: 1.37 MG/DL (ref 0.6–1.3)
DOP CALC AO PEAK VEL: 1.25 M/S
DOP CALC AO VTI: 22.61 CM
DOP CALC LVOT PEAK VEL VTI: 19.29 CM
DOP CALC LVOT PEAK VEL: 1.04 M/S
E WAVE DECELERATION TIME: 271 MS
EOSINOPHIL # BLD AUTO: 0.13 THOUSAND/ÂΜL (ref 0–0.61)
EOSINOPHIL NFR BLD AUTO: 1 % (ref 0–6)
ERYTHROCYTE [DISTWIDTH] IN BLOOD BY AUTOMATED COUNT: 13.9 % (ref 11.6–15.1)
GFR SERPL CREATININE-BSD FRML MDRD: 43 ML/MIN/1.73SQ M
GLUCOSE SERPL-MCNC: 104 MG/DL (ref 65–140)
HCT VFR BLD AUTO: 37 % (ref 34.8–46.1)
HGB BLD-MCNC: 11.6 G/DL (ref 11.5–15.4)
IMM GRANULOCYTES # BLD AUTO: 0.1 THOUSAND/UL (ref 0–0.2)
IMM GRANULOCYTES NFR BLD AUTO: 1 % (ref 0–2)
LYMPHOCYTES # BLD AUTO: 2.79 THOUSANDS/ÂΜL (ref 0.6–4.47)
LYMPHOCYTES NFR BLD AUTO: 22 % (ref 14–44)
MCH RBC QN AUTO: 31.9 PG (ref 26.8–34.3)
MCHC RBC AUTO-ENTMCNC: 31.4 G/DL (ref 31.4–37.4)
MCV RBC AUTO: 102 FL (ref 82–98)
MONOCYTES # BLD AUTO: 0.68 THOUSAND/ÂΜL (ref 0.17–1.22)
MONOCYTES NFR BLD AUTO: 5 % (ref 4–12)
MV PEAK A VEL: 0.81 M/S
MV PEAK E VEL: 61 CM/S
MV STENOSIS PRESSURE HALF TIME: 79 MS
MV VALVE AREA P 1/2 METHOD: 2.78 CM2
NEUTROPHILS # BLD AUTO: 9.01 THOUSANDS/ÂΜL (ref 1.85–7.62)
NEUTS SEG NFR BLD AUTO: 70 % (ref 43–75)
NRBC BLD AUTO-RTO: 0 /100 WBCS
PLATELET # BLD AUTO: 407 THOUSANDS/UL (ref 149–390)
PMV BLD AUTO: 11.9 FL (ref 8.9–12.7)
POTASSIUM SERPL-SCNC: 4 MMOL/L (ref 3.5–5.3)
RBC # BLD AUTO: 3.64 MILLION/UL (ref 3.81–5.12)
SL CV LV EF: 62
SODIUM SERPL-SCNC: 140 MMOL/L (ref 135–147)
WBC # BLD AUTO: 12.85 THOUSAND/UL (ref 4.31–10.16)

## 2022-10-10 RX ADMIN — GABAPENTIN 100 MG: 100 CAPSULE ORAL at 16:09

## 2022-10-10 RX ADMIN — HEPARIN SODIUM 5000 UNITS: 5000 INJECTION INTRAVENOUS; SUBCUTANEOUS at 22:02

## 2022-10-10 RX ADMIN — HEPARIN SODIUM 5000 UNITS: 5000 INJECTION INTRAVENOUS; SUBCUTANEOUS at 13:01

## 2022-10-10 RX ADMIN — AMLODIPINE BESYLATE 10 MG: 10 TABLET ORAL at 08:25

## 2022-10-10 RX ADMIN — GABAPENTIN 100 MG: 100 CAPSULE ORAL at 22:02

## 2022-10-10 RX ADMIN — METOPROLOL TARTRATE 25 MG: 25 TABLET, FILM COATED ORAL at 22:02

## 2022-10-10 RX ADMIN — CEFAZOLIN SODIUM 2000 MG: 2 SOLUTION INTRAVENOUS at 01:18

## 2022-10-10 RX ADMIN — Medication 250 MG: at 08:25

## 2022-10-10 RX ADMIN — MICONAZOLE NITRATE: 20 CREAM TOPICAL at 08:25

## 2022-10-10 RX ADMIN — QUETIAPINE FUMARATE 50 MG: 25 TABLET ORAL at 17:14

## 2022-10-10 RX ADMIN — HEPARIN SODIUM 5000 UNITS: 5000 INJECTION INTRAVENOUS; SUBCUTANEOUS at 05:28

## 2022-10-10 RX ADMIN — QUETIAPINE FUMARATE 50 MG: 25 TABLET ORAL at 08:25

## 2022-10-10 RX ADMIN — Medication 250 MG: at 17:14

## 2022-10-10 RX ADMIN — MICONAZOLE NITRATE: 20 CREAM TOPICAL at 17:14

## 2022-10-10 RX ADMIN — CEFAZOLIN SODIUM 2000 MG: 2 SOLUTION INTRAVENOUS at 13:01

## 2022-10-10 RX ADMIN — GABAPENTIN 100 MG: 100 CAPSULE ORAL at 08:25

## 2022-10-10 RX ADMIN — Medication 1 PATCH: at 08:26

## 2022-10-10 RX ADMIN — METOPROLOL TARTRATE 25 MG: 25 TABLET, FILM COATED ORAL at 08:26

## 2022-10-10 NOTE — PLAN OF CARE
Problem: PHYSICAL THERAPY ADULT  Goal: Performs mobility at highest level of function for planned discharge setting  See evaluation for individualized goals  Description: Treatment/Interventions: Functional transfer training, LE strengthening/ROM, Elevations, Therapeutic exercise, Cognitive reorientation, Patient/family training, Equipment eval/education, Bed mobility, Gait training, Compensatory technique education, Continued evaluation, Spoke to nursing, Spoke to case management, Spoke to MD, OT, ST  Equipment Recommended: Logan Martinez (if patient does not own)       See flowsheet documentation for full assessment, interventions and recommendations  Outcome: Progressing  Note: Prognosis: Guarded  Problem List: Decreased strength, Decreased range of motion, Impaired balance, Decreased mobility, Decreased coordination, Decreased cognition, Impaired judgement, Decreased safety awareness  Assessment: Pt seen for PT treatment session this date with interventions consisting of gait training w/ emphasis on improving pt's ability to ambulate level surfaces x 3 steps fwd/bkwd, 2 sidesteps with min A of 2 provided by therapist with RW and therapeutic activity consisting of training: bed mobility, supine<>sit transfers, sit<>stand transfers, vc and tactile cues for static sitting posture faciliation and vc and tactile cues for static standing posture faciliation  Pt agreeable to PT treatment session upon arrival, pt found supine in bed w/ HOB elevated, responsive  In comparison to previous session, pt with improvements in ambulation distance and being able to advance BLE  Post session: pt returned BTB, bed alarm engaged and all needs in reach  Continue to recommend post acute rehabilitation services at time of d/c in order to maximize pt's functional independence and safety w/ mobility   Pt continues to be functioning below baseline level, and remains limited 2* factors listed above and including decreased cognition, and safety awareness, high risk for falls  PT will continue to see pt during current hospitalization in order to address the deficits listed above and provide interventions consistent w/ POC in effort to achieve STGs  Barriers to Discharge: Inaccessible home environment, Decreased caregiver support  Barriers to Discharge Comments: unable to identify at this time  PT Discharge Recommendation: Post acute rehabilitation services    See flowsheet documentation for full assessment

## 2022-10-10 NOTE — PHYSICAL THERAPY NOTE
PHYSICAL THERAPY TREATMENT NOTE  NAME:  Casie Setting  DATE: 10/10/22    Length Of Stay: 21  Performed at least 2 patient identifiers during session: Name and ID bracelet    TREATMENT:      10/10/22 1456   PT Last Visit   PT Visit Date 10/10/22   Note Type   Note Type Treatment   Pain Assessment   Pain Assessment Tool FLACC   Pain Rating: FLACC (Rest) - Face 0   Pain Rating: FLACC (Rest) - Legs 0   Pain Rating: FLACC (Rest) - Activity 0   Pain Rating: FLACC (Rest) - Cry 0   Pain Rating: FLACC (Rest) - Consolability 0   Score: FLACC (Rest) 0   Pain Rating: FLACC (Activity) - Face 1   Pain Rating: FLACC (Activity) - Legs 0   Pain Rating: FLACC (Activity) - Activity 0   Pain Rating: FLACC (Activity) - Cry 0   Pain Rating: FLACC (Activity) - Consolability 0   Score: FLACC (Activity) 1   Restrictions/Precautions   Weight Bearing Precautions Per Order No   Other Precautions Cognitive; Chair Alarm; Bed Alarm; Restraints; Fall Risk   General   Chart Reviewed Yes   Response to Previous Treatment Patient unable to report, no changes reported from family or staff   Family/Caregiver Present Yes   Cognition   Overall Cognitive Status Impaired   Arousal/Participation Responsive; Alert   Attention Difficulty attending to directions   Orientation Level Oriented to person   Memory Decreased short term memory;Decreased recall of recent events;Decreased recall of precautions;Decreased recall of biographical information   Following Commands Follows one step commands inconsistently   Comments requires constant redirection and cueing, pleasant   Bed Mobility   Supine to Sit 4  Minimal assistance   Additional items HOB elevated; Bedrails; Increased time required;Verbal cues; Assist x 1   Sit to Supine 4  Minimal assistance   Additional items Assist x 2; Increased time required;Verbal cues;LE management   Additional Comments pt able to sit unsupported at EOB   Transfers   Sit to Stand 4  Minimal assistance   Additional items Assist x 2;Bedrails; Increased time required;Verbal cues   Stand to Sit 4  Minimal assistance   Additional items Assist x 2; Increased time required;Verbal cues   Additional Comments verbal cues required for sequencing and proper hand placement   Ambulation/Elevation   Gait pattern Improper Weight shift;Narrow SOY; Decreased foot clearance;Decreased R stance; Foward flexed; Short stride; Ataxia; Excessively slow   Gait Assistance 4  Minimal assist   Additional items Assist x 2;Verbal cues; Tactile cues   Assistive Device Rolling walker   Distance 3 steps fwd/bkwd, 2 side steps   Ambulation/Elevation Additional Comments decreased weight bearing on RLE   Balance   Static Sitting Fair   Dynamic Sitting Fair -   Static Standing Fair -   Dynamic Standing Poor +   Ambulatory Poor +   Endurance Deficit   Endurance Deficit Yes   Activity Tolerance   Activity Tolerance Treatment limited secondary to medical complications (Comment)  (impaired cognition)   Nurse Made Aware CHRISTIANO Camp   Assessment   Prognosis Guarded   Problem List Decreased strength;Decreased range of motion; Impaired balance;Decreased mobility; Decreased coordination;Decreased cognition; Impaired judgement;Decreased safety awareness   Assessment Pt seen for PT treatment session this date with interventions consisting of gait training w/ emphasis on improving pt's ability to ambulate level surfaces x 3 steps fwd/bkwd, 2 sidesteps with min A of 2 provided by therapist with RW and therapeutic activity consisting of training: bed mobility, supine<>sit transfers, sit<>stand transfers, vc and tactile cues for static sitting posture faciliation and vc and tactile cues for static standing posture faciliation  Pt agreeable to PT treatment session upon arrival, pt found supine in bed w/ HOB elevated, responsive  In comparison to previous session, pt with improvements in ambulation distance and being able to advance BLE   Post session: pt returned BTB, bed alarm engaged and all needs in reach  Continue to recommend post acute rehabilitation services at time of d/c in order to maximize pt's functional independence and safety w/ mobility  Pt continues to be functioning below baseline level, and remains limited 2* factors listed above and including decreased cognition, and safety awareness, high risk for falls  PT will continue to see pt during current hospitalization in order to address the deficits listed above and provide interventions consistent w/ POC in effort to achieve STGs  Barriers to Discharge Inaccessible home environment;Decreased caregiver support   Goals   Patient Goals none stated, impaired cognition   STG Expiration Date 10/14/22   Plan   Treatment/Interventions Functional transfer training;LE strengthening/ROM; Therapeutic exercise; Endurance training;Cognitive reorientation;Patient/family training;Equipment eval/education; Bed mobility;Gait training; Compensatory technique education;Spoke to nursing   Progress Slow progress, cognitive deficits   PT Frequency   (4-5x/wk)   Recommendation   PT Discharge Recommendation Post acute rehabilitation services   AM-PAC Basic Mobility Inpatient   Turning in Bed Without Bedrails 3   Lying on Back to Sitting on Edge of Flat Bed 3   Moving Bed to Chair 3   Standing Up From Chair 3   Walk in Room 2   Climb 3-5 Stairs 2   Basic Mobility Inpatient Raw Score 16   Basic Mobility Standardized Score 38 32   Turning Head Towards Sound 3   Follow Simple Instructions 2   Low Function Basic Mobility Raw Score 21   Low Function Basic Mobility Standardized Score 34 39   Highest Level Of Mobility   JH-HLM Goal 5: Stand one or more mins   JH-HLM Achieved 5: Stand (1 or more minutes)   Education   Education Provided Mobility training;Assistive device   End of Consult   Patient Position at End of Consult Supine;Bed/Chair alarm activated; All needs within reach   End of Consult Comments soft wrist restraints in place       The patient's AM-PAC Basic Mobility Inpatient Short Form Raw Score is 16  A Raw score of less than or equal to 16 suggests the patient may benefit from discharge to post-acute rehabilitation services  Please also refer to the recommendation of the Physical Therapist for safe discharge planning        Arturo Rivero

## 2022-10-10 NOTE — PLAN OF CARE
Problem: PAIN - ADULT  Goal: Verbalizes/displays adequate comfort level or baseline comfort level  Description: Interventions:  - Encourage patient to monitor pain and request assistance  - Assess pain using appropriate pain scale  - Administer analgesics based on type and severity of pain and evaluate response  - Implement non-pharmacological measures as appropriate and evaluate response  - Consider cultural and social influences on pain and pain management  - Notify physician/advanced practitioner if interventions unsuccessful or patient reports new pain  Outcome: Progressing     Problem: SAFETY ADULT  Goal: Patient will remain free of falls  Description: INTERVENTIONS:  - Educate patient/family on patient safety including physical limitations  - Instruct patient to call for assistance with activity   - Consult OT/PT to assist with strengthening/mobility   - Keep Call bell within reach  - Keep bed low and locked with side rails adjusted as appropriate  - Keep care items and personal belongings within reach  - Initiate and maintain comfort rounds  - Make Fall Risk Sign visible to staff  - Offer Toileting every 2 Hours, in advance of need  - Initiate/Maintain bed alarm  - Obtain necessary fall risk management equipment: walker  - Apply yellow socks and bracelet for high fall risk patients  - Consider moving patient to room near nurses station  Outcome: Progressing  Goal: Maintain or return to baseline ADL function  Description: INTERVENTIONS:  -  Assess patient's ability to carry out ADLs; assess patient's baseline for ADL function and identify physical deficits which impact ability to perform ADLs (bathing, care of mouth/teeth, toileting, grooming, dressing, etc )  - Assess/evaluate cause of self-care deficits   - Assess range of motion  - Assess patient's mobility; develop plan if impaired  - Assess patient's need for assistive devices and provide as appropriate  - Encourage maximum independence but intervene and supervise when necessary  - Involve family in performance of ADLs  - Assess for home care needs following discharge   - Consider OT consult to assist with ADL evaluation and planning for discharge  - Provide patient education as appropriate  Outcome: Not Progressing  Goal: Maintains/Returns to pre admission functional level  Description: INTERVENTIONS:  - Perform BMAT or MOVE assessment daily    - Set and communicate daily mobility goal to care team and patient/family/caregiver  - Collaborate with rehabilitation services on mobility goals if consulted  - Perform Range of Motion 4-6 times a day  - Reposition patient every 2 hours    - Dangle patient 3-4 times a day  - Stand patient 3 times a day  - Ambulate patient 3-4 times a day  - Out of bed for toileting  - Record patient progress and toleration of activity level   Outcome: Not Progressing     Problem: INFECTION - ADULT  Goal: Absence or prevention of progression during hospitalization  Description: INTERVENTIONS:  - Assess and monitor for signs and symptoms of infection  - Monitor lab/diagnostic results  - Monitor all insertion sites, i e  indwelling lines, tubes, and drains  - Monitor endotracheal if appropriate and nasal secretions for changes in amount and color  - [place] appropriate cooling/warming therapies per order  - Administer medications as ordered  - Instruct and encourage patient and family to use good hand hygiene technique  - Identify and instruct in appropriate isolation precautions for identified infection/condition  Outcome: Progressing     Problem: DISCHARGE PLANNING  Goal: Discharge to home or other facility with appropriate resources  Description: INTERVENTIONS:  - Identify barriers to discharge w/patient and caregiver  - Arrange for needed discharge resources and transportation as appropriate  - Identify discharge learning needs (meds, wound care, etc )  - Arrange for interpretive services to assist at discharge as needed  - Refer to Case Management Department for coordinating discharge planning if the patient needs post-hospital services based on physician/advanced practitioner order or complex needs related to functional status, cognitive ability, or social support system  Outcome: Progressing     Problem: Knowledge Deficit  Goal: Patient/family/caregiver demonstrates understanding of disease process, treatment plan, medications, and discharge instructions  Description: Complete learning assessment and assess knowledge base    Interventions:  - Provide teaching at level of understanding  - Provide teaching via preferred learning methods  Outcome: Not Progressing     Problem: Potential for Falls  Goal: Patient will remain free of falls  Description: INTERVENTIONS:  - Educate patient/family on patient safety including physical limitations  - Instruct patient to call for assistance with activity   - Consult OT/PT to assist with strengthening/mobility   - Keep Call bell within reach  - Keep bed low and locked with side rails adjusted as appropriate  - Keep care items and personal belongings within reach  - Initiate and maintain comfort rounds  - Make Fall Risk Sign visible to staff  - Offer Toileting every 2 Hours, in advance of need  - Initiate/Maintain bed alarm  - Obtain necessary fall risk management equipment: walker  - Apply yellow socks and bracelet for high fall risk patients  - Consider moving patient to room near nurses station  Outcome: Progressing     Problem: MOBILITY - ADULT  Goal: Maintain or return to baseline ADL function  Description: INTERVENTIONS:  -  Assess patient's ability to carry out ADLs; assess patient's baseline for ADL function and identify physical deficits which impact ability to perform ADLs (bathing, care of mouth/teeth, toileting, grooming, dressing, etc )  - Assess/evaluate cause of self-care deficits   - Assess range of motion  - Assess patient's mobility; develop plan if impaired  - Assess patient's need for assistive devices and provide as appropriate  - Encourage maximum independence but intervene and supervise when necessary  - Involve family in performance of ADLs  - Assess for home care needs following discharge   - Consider OT consult to assist with ADL evaluation and planning for discharge  - Provide patient education as appropriate  Outcome: Not Progressing  Goal: Maintains/Returns to pre admission functional level  Description: INTERVENTIONS:  - Perform BMAT or MOVE assessment daily    - Set and communicate daily mobility goal to care team and patient/family/caregiver  - Collaborate with rehabilitation services on mobility goals if consulted  - Perform Range of Motion 4-6 times a day  - Reposition patient every 2 hours    - Dangle patient 3-4 times a day  - Stand patient 3 times a day  - Ambulate patient 3-4 times a day  - Out of bed for toileting  - Record patient progress and toleration of activity level   Outcome: Not Progressing     Problem: Prexisting or High Potential for Compromised Skin Integrity  Goal: Skin integrity is maintained or improved  Description: INTERVENTIONS:  - Identify patients at risk for skin breakdown  - Assess and monitor skin integrity  - Assess and monitor nutrition and hydration status  - Monitor labs   - Assess for incontinence   - Turn and reposition patient  - Assist with mobility/ambulation  - Relieve pressure over bony prominences  - Avoid friction and shearing  - Provide appropriate hygiene as needed including keeping skin clean and dry  - Evaluate need for skin moisturizer/barrier cream  - Collaborate with interdisciplinary team   - Patient/family teaching  - Consider wound care consult   Outcome: Not Progressing

## 2022-10-10 NOTE — PLAN OF CARE
Problem: OCCUPATIONAL THERAPY ADULT  Goal: Performs self-care activities at highest level of function for planned discharge setting  See evaluation for individualized goals  Description: Treatment Interventions: ADL retraining, Functional transfer training, UE strengthening/ROM, Endurance training, Cognitive reorientation, Patient/family training, Equipment evaluation/education, Neuromuscular reeducation, Fine motor coordination activities, Compensatory technique education, Continued evaluation, Energy conservation, Activityengagement          See flowsheet documentation for full assessment, interventions and recommendations  Outcome: Progressing  Note: Limitation: Decreased ADL status, Decreased Safe judgement during ADL, Decreased cognition, Decreased endurance, Decreased high-level ADLs     Assessment: Patient participated in skilled OT session this date with interventions consisting of therapeutic activities, therapeutic exercises, neuro-reeducation, and self-care/ADL training to increase B UE strength, functional endurance/activity tolerance, static/dynamic sitting/standing balance, and overall safety awareness to increase (I) with ADLs/IADLs to return to PLOF  Provided education on HEP, energy conservation techniques, deep breathing techniques, fall prevention strategies, and overall safety awareness  Patient agreeable to OT treatment session, upon arrival patient was found supine in bed; restraints applied  Patient's significant other is present and offers pt's glasses, which are an older prescription  Donned to pt, and she reports it helps her see better  Pt more responsive to questioning today  She is not oriented to time, but when therapist stated that it is almost Halloween, she states "October!" Making 3-5 word sentences   Continues to demonstrate difficulty weight bearing through R LE, but S O  states she was having difficulty with that PTA due to a disc in her spine and was supposed to have surgical intervention before this happened  Patient requiring verbal cues for safety and verbal cues for pacing through activity steps  Refer to flowsheet for functional performance  Patient continues to be functioning below baseline level, occupational performance remains limited secondary to factors listed above and increased risk for falls and injury  The patient's raw score on the AM-PAC Daily Activity inpatient short form is 8, standardized score is 29 04, less than 39 4  Patients at this level are likely to benefit from discharge to post-acute rehabilitation services vs LTC  Please refer to the recommendation of the Occupational Therapist for safe discharge planning       OT Discharge Recommendation: Post acute rehabilitation services

## 2022-10-10 NOTE — PLAN OF CARE
Problem: PAIN - ADULT  Goal: Verbalizes/displays adequate comfort level or baseline comfort level  Description: Interventions:  - Encourage patient to monitor pain and request assistance  - Assess pain using appropriate pain scale  - Administer analgesics based on type and severity of pain and evaluate response  - Implement non-pharmacological measures as appropriate and evaluate response  - Consider cultural and social influences on pain and pain management  - Notify physician/advanced practitioner if interventions unsuccessful or patient reports new pain  Outcome: Progressing     Problem: INFECTION - ADULT  Goal: Absence or prevention of progression during hospitalization  Description: INTERVENTIONS:  - Assess and monitor for signs and symptoms of infection  - Monitor lab/diagnostic results  - Monitor all insertion sites, i e  indwelling lines, tubes, and drains  - Monitor endotracheal if appropriate and nasal secretions for changes in amount and color  - [place] appropriate cooling/warming therapies per order  - Administer medications as ordered  - Instruct and encourage patient and family to use good hand hygiene technique  - Identify and instruct in appropriate isolation precautions for identified infection/condition  Outcome: Progressing     Problem: SAFETY ADULT  Goal: Patient will remain free of falls  Description: INTERVENTIONS:  - Educate patient/family on patient safety including physical limitations  - Instruct patient to call for assistance with activity   - Consult OT/PT to assist with strengthening/mobility   - Keep Call bell within reach  - Keep bed low and locked with side rails adjusted as appropriate  - Keep care items and personal belongings within reach  - Initiate and maintain comfort rounds  - Make Fall Risk Sign visible to staff  - Offer Toileting every 2 Hours, in advance of need  - Initiate/Maintain bed alarm  - Obtain necessary fall risk management equipment: walker  - Apply yellow socks and bracelet for high fall risk patients  - Consider moving patient to room near nurses station  Outcome: Progressing  Goal: Maintain or return to baseline ADL function  Description: INTERVENTIONS:  -  Assess patient's ability to carry out ADLs; assess patient's baseline for ADL function and identify physical deficits which impact ability to perform ADLs (bathing, care of mouth/teeth, toileting, grooming, dressing, etc )  - Assess/evaluate cause of self-care deficits   - Assess range of motion  - Assess patient's mobility; develop plan if impaired  - Assess patient's need for assistive devices and provide as appropriate  - Encourage maximum independence but intervene and supervise when necessary  - Involve family in performance of ADLs  - Assess for home care needs following discharge   - Consider OT consult to assist with ADL evaluation and planning for discharge  - Provide patient education as appropriate  Outcome: Progressing  Goal: Maintains/Returns to pre admission functional level  Description: INTERVENTIONS:  - Perform BMAT or MOVE assessment daily    - Set and communicate daily mobility goal to care team and patient/family/caregiver  - Collaborate with rehabilitation services on mobility goals if consulted  - Perform Range of Motion 4-6 times a day  - Reposition patient every 2 hours    - Dangle patient 3-4 times a day  - Stand patient 3 times a day  - Ambulate patient 3-4 times a day  - Out of bed for toileting  - Record patient progress and toleration of activity level   Outcome: Progressing     Problem: DISCHARGE PLANNING  Goal: Discharge to home or other facility with appropriate resources  Description: INTERVENTIONS:  - Identify barriers to discharge w/patient and caregiver  - Arrange for needed discharge resources and transportation as appropriate  - Identify discharge learning needs (meds, wound care, etc )  - Arrange for interpretive services to assist at discharge as needed  - Refer to Case Management Department for coordinating discharge planning if the patient needs post-hospital services based on physician/advanced practitioner order or complex needs related to functional status, cognitive ability, or social support system  Outcome: Progressing     Problem: Knowledge Deficit  Goal: Patient/family/caregiver demonstrates understanding of disease process, treatment plan, medications, and discharge instructions  Description: Complete learning assessment and assess knowledge base    Interventions:  - Provide teaching at level of understanding  - Provide teaching via preferred learning methods  Outcome: Progressing     Problem: Potential for Falls  Goal: Patient will remain free of falls  Description: INTERVENTIONS:  - Educate patient/family on patient safety including physical limitations  - Instruct patient to call for assistance with activity   - Consult OT/PT to assist with strengthening/mobility   - Keep Call bell within reach  - Keep bed low and locked with side rails adjusted as appropriate  - Keep care items and personal belongings within reach  - Initiate and maintain comfort rounds  - Make Fall Risk Sign visible to staff  - Offer Toileting every 2 Hours, in advance of need  - Initiate/Maintain bed alarm  - Obtain necessary fall risk management equipment: walker  - Apply yellow socks and bracelet for high fall risk patients  - Consider moving patient to room near nurses station  Outcome: Progressing     Problem: MOBILITY - ADULT  Goal: Maintain or return to baseline ADL function  Description: INTERVENTIONS:  -  Assess patient's ability to carry out ADLs; assess patient's baseline for ADL function and identify physical deficits which impact ability to perform ADLs (bathing, care of mouth/teeth, toileting, grooming, dressing, etc )  - Assess/evaluate cause of self-care deficits   - Assess range of motion  - Assess patient's mobility; develop plan if impaired  - Assess patient's need for assistive devices and provide as appropriate  - Encourage maximum independence but intervene and supervise when necessary  - Involve family in performance of ADLs  - Assess for home care needs following discharge   - Consider OT consult to assist with ADL evaluation and planning for discharge  - Provide patient education as appropriate  Outcome: Progressing  Goal: Maintains/Returns to pre admission functional level  Description: INTERVENTIONS:  - Perform BMAT or MOVE assessment daily    - Set and communicate daily mobility goal to care team and patient/family/caregiver  - Collaborate with rehabilitation services on mobility goals if consulted  - Perform Range of Motion 4-6 times a day  - Reposition patient every 2 hours  - Dangle patient 3-4 times a day  - Stand patient 3 times a day  - Ambulate patient 3-4 times a day  - Out of bed for toileting  - Record patient progress and toleration of activity level   Outcome: Progressing     Problem: Prexisting or High Potential for Compromised Skin Integrity  Goal: Skin integrity is maintained or improved  Description: INTERVENTIONS:  - Identify patients at risk for skin breakdown  - Assess and monitor skin integrity  - Assess and monitor nutrition and hydration status  - Monitor labs   - Assess for incontinence   - Turn and reposition patient  - Assist with mobility/ambulation  - Relieve pressure over bony prominences  - Avoid friction and shearing  - Provide appropriate hygiene as needed including keeping skin clean and dry  - Evaluate need for skin moisturizer/barrier cream  - Collaborate with interdisciplinary team   - Patient/family teaching  - Consider wound care consult   Outcome: Progressing     Problem: Nutrition/Hydration-ADULT  Goal: Nutrient/Hydration intake appropriate for improving, restoring or maintaining nutritional needs  Description: Monitor and assess patient's nutrition/hydration status for malnutrition   Collaborate with interdisciplinary team and initiate plan and interventions as ordered  Monitor patient's weight and dietary intake as ordered or per policy  Utilize nutrition screening tool and intervene as necessary  Determine patient's food preferences and provide high-protein, high-caloric foods as appropriate  INTERVENTIONS:  - Monitor oral intake, urinary output, labs, and treatment plans  - Assess nutrition and hydration status and recommend course of action  - Evaluate amount of meals eaten  - Assist patient with eating if necessary   - Allow adequate time for meals  - Recommend/ encourage appropriate diets, oral nutritional supplements, and vitamin/mineral supplements  - Order, calculate, and assess calorie counts as needed  - Recommend, monitor, and adjust tube feedings and TPN/PPN based on assessed needs  - Assess need for intravenous fluids  - Provide specific nutrition/hydration education as appropriate  - Include patient/family/caregiver in decisions related to nutrition  Outcome: Progressing     Problem: NEUROSENSORY - ADULT  Goal: Achieves maximal functionality and self care  Description: INTERVENTIONS  - Monitor swallowing and airway patency with patient fatigue and changes in neurological status  - Encourage and assist patient to increase activity and self care     - Encourage visually impaired, hearing impaired and aphasic patients to use assistive/communication devices  Outcome: Progressing     Problem: CARDIOVASCULAR - ADULT  Goal: Maintains optimal cardiac output and hemodynamic stability  Description: INTERVENTIONS:  - Monitor I/O, vital signs and rhythm  - Monitor for S/S and trends of decreased cardiac output  - Administer and titrate ordered vasoactive medications to optimize hemodynamic stability  - Assess quality of pulses, skin color and temperature  - Assess for signs of decreased coronary artery perfusion  - Instruct patient to report change in severity of symptoms  Outcome: Progressing     Problem: RESPIRATORY - ADULT  Goal: Achieves optimal ventilation and oxygenation  Description: INTERVENTIONS:  - Assess for changes in respiratory status  - Assess for changes in mentation and behavior  - Position to facilitate oxygenation and minimize respiratory effort  - Oxygen administered by appropriate delivery if ordered  - Initiate smoking cessation education as indicated  - Encourage broncho-pulmonary hygiene including cough, deep breathe, Incentive Spirometry  - Assess the need for suctioning and aspirate as needed  - Assess and instruct to report SOB or any respiratory difficulty  - Respiratory Therapy support as indicated  Outcome: Progressing     Problem: GASTROINTESTINAL - ADULT  Goal: Maintains adequate nutritional intake  Description: INTERVENTIONS:  - Monitor percentage of each meal consumed  - Identify factors contributing to decreased intake, treat as appropriate  - Assist with meals as needed  - Monitor I&O, weight, and lab values if indicated  - Obtain nutrition services referral as needed  Outcome: Progressing     Problem: METABOLIC, FLUID AND ELECTROLYTES - ADULT  Goal: Electrolytes maintained within normal limits  Description: INTERVENTIONS:  - Monitor labs and assess patient for signs and symptoms of electrolyte imbalances  - Administer electrolyte replacement as ordered  - Monitor response to electrolyte replacements, including repeat lab results as appropriate  - Instruct patient on fluid and nutrition as appropriate  Outcome: Progressing  Goal: Fluid balance maintained  Description: INTERVENTIONS:  - Monitor labs   - Monitor I/O and WT  - Instruct patient on fluid and nutrition as appropriate  - Assess for signs & symptoms of volume excess or deficit  Outcome: Progressing     Problem: SKIN/TISSUE INTEGRITY - ADULT  Goal: Skin Integrity remains intact(Skin Breakdown Prevention)  Description: Assess:  -Perform Erickson assessment every shift  -Clean and moisturize skin every day  -Inspect skin when repositioning, toileting, and assisting with ADLS  -Assess extremities for adequate circulation and sensation     Bed Management:  -Have minimal linens on bed & keep smooth, unwrinkled  -Change linens as needed when moist or perspiring  -Avoid sitting or lying in one position for more than 2 hours while in bed  -Keep HOB at 45 degrees     Toileting:  -Offer bedside commode  -Assess for incontinence every 2 hours  -Use incontinent care products after each incontinent episode such as brief    Activity:  -Mobilize patient 3-4 times a day  -Encourage activity and walks on unit  -Encourage or provide ROM exercises   -Turn and reposition patient every 2 Hours  -Use appropriate equipment to lift or move patient in bed  -Instruct/ Assist with weight shifting every 2 when out of bed in chair  -Consider limitation of chair time 2 hour intervals    Skin Care:  -Avoid use of baby powder, tape, friction and shearing, hot water or constrictive clothing  -Relieve pressure over bony prominences using pillows  -Do not massage red bony areas    Next Steps:  -Teach patient strategies to minimize risks such as walker  -Consider consults to  interdisciplinary teams such as wound  Outcome: Progressing  Goal: Incision(s), wounds(s) or drain site(s) healing without S/S of infection  Description: INTERVENTIONS  - Assess and document dressing, incision, wound bed, drain sites and surrounding tissue  - Provide patient and family education  - Perform skin care/dressing changes every PRN  Outcome: Progressing     Problem: MUSCULOSKELETAL - ADULT  Goal: Maintain or return mobility to safest level of function  Description: INTERVENTIONS:  - Assess patient's ability to carry out ADLs; assess patient's baseline for ADL function and identify physical deficits which impact ability to perform ADLs (bathing, care of mouth/teeth, toileting, grooming, dressing, etc )  - Assess/evaluate cause of self-care deficits   - Assess range of motion  - Assess patient's mobility  - Assess patient's need for assistive devices and provide as appropriate  - Encourage maximum independence but intervene and supervise when necessary  - Involve family in performance of ADLs  - Assess for home care needs following discharge   - Consider OT consult to assist with ADL evaluation and planning for discharge  - Provide patient education as appropriate  Outcome: Progressing

## 2022-10-10 NOTE — PROGRESS NOTES
Progress Note - Infectious Disease   Nimco Hilton 45 y o  female MRN: [id number]  Unit/Bed#: [place] [address] -01 Encounter: [id number]    Impression/Plan:  1  SIRS vs Sepsis  Evolving since admission  Fever, tachycardia, and mild leukocytosis  Suspect this is likely due to MSSA bacteremia  No other clear source appreciated  COVID-19 negative   UA benign   Status post lumbar puncture for altered mental status with negative ME panel   Patient has clinically improved  No recurrent high fever spike  No lactic acidosis  This morning she is afebrile and her WBC count is trending down  Repeat blood cultures were negative >5 days   -antibiotics as below   -monitor CBCD and BMP  -monitor vitals  -supportive care     2  MSSA bacteremia  Patient was found down for prolonged period of time with evidence of multiple wounds on admission  Suspect cutaneous vs endovascular source, likely phlebitis as there is report of RUE erythema surrounding prior IV in addition to palpable cord in left antecub  TTE negative for obvious vegetation  CHERYLE was planned but then cancelled due to tenuous respiratory status  Repeat TTE results are pending  The patient is currently receiving IV cefazolin which she is tolerating without difficulty  Anticipate 2-4 weeks of IV antibiotic based on TTE vs CHERYLE findings   -continue IV cefazolin  -anticipate 2-4 weeks of IV antibiotic, duration to be planned based on TTE/CHERYLE results  -weekly CBCD and creatinine while on IV antibiotic  -monitor vitals  -patient may require outpatient ID follow up after discharge, I will coordinate as needed and place information in discharge planning     3  Toxic Metabolic encephalopathy   Likely multifactorial etiology with uremia playing a role   This was initially thought to be secondary to gabapentin and morphine use in the setting of renal failure, hypotension, rhabdomyolysis   MRI brain negative   Lumbar puncture with opening pressure 26   Negative ME panel  CSF fluid culture not collected   The most likely to be secondary to metabolic issues, medication effect, and possible Wernicke's encephalopathy  Psychiatric issues also likely playing a role  -serial neuro exams  -monitor cognition, mood, and mental status  -treat metabolic issues and psychiatric issues  -supportive care  -continue follow up with behavioral health  -no additional ID workup needed     4  Acute renal failure   Creatinine on admission 5 88 with CK over 32,000  Creatinine reached plateau, now down trending   Likely multifactorial etiology in the setting of rhabdomyolysis and dehydration  Likely ANUJA/ATN    Creatinine was 1 37 this morning   -monitor creatinine  -dose adjust antibiotics for renal function as needed  -avoid nephrotoxins  -continue follow up with nephrology     5  Tobacco abuse   Encourage cessation as recommended by primary team   -NRT per primary service     6  Antibiotic allergy  Reports hives with penicillin  Patient has been tolerating cephalosporin without difficulty  -monitor patient for adverse medication reactions     Above plan was discussed in detail with patient at the bedside  Above plan was discussed in detail with SLIM  Antibiotics:  Cefazolin 13  Antibiotic 13    Subjective:  Unable to complete ROS as patient remains confused  Is speaking in short sentences, some mumbling  She offers no specific complaints other than her lips are dry  Objective:  Vitals:  Temp:  [96 8 °F (36 °C)-97 5 °F (36 4 °C)] 97 5 °F (36 4 °C)  HR:  [] 107  Resp:  [17-19] 17  BP: (120-146)/(84-93) 123/93  SpO2:  [97 %-100 %] 98 %  Temp (24hrs), Av 2 °F (36 2 °C), Min:96 8 °F (36 °C), Max:97 5 °F (36 4 °C)  Current: Temperature: 97 5 °F (36 4 °C)    Physical Exam:   General Appearance:  Alert but confused, appears to attempt to communicate but offers no complete thoughts  She appears restless,  Nontoxic  Patient in bed with B/L wrist and mitt restraints intact  Throat: Oropharynx moist without lesions  Lips dry, I helped patient apply chapstick  Lungs:   Clear to auscultation bilaterally; no wheezes, rhonchi or rales; respirations unlabored on room air  Heart:  Tachycardic; no murmur, rub or gallop  Abdomen:   Soft, non-tender, non-distended, positive bowel sounds  Extremities: No clubbing or cyanosis, no edema  Skin: No new rashes noted on exposed skin  Labs, Imaging, & Other studies:   All pertinent labs and imaging studies were personally reviewed  Results from last 7 days   Lab Units 10/10/22  0455 10/09/22  0432 10/08/22  0441   WBC Thousand/uL 12 85* 14 62* 12 39*   HEMOGLOBIN g/dL 11 6 11 1* 12 0   PLATELETS Thousands/uL 407* 368 361     Results from last 7 days   Lab Units 10/10/22  0455 10/05/22  0449 10/04/22  0444   POTASSIUM mmol/L 4 0   < > 2 9*   CHLORIDE mmol/L 104   < > 102   CO2 mmol/L 27   < > 37*   BUN mg/dL 20   < > 34*   CREATININE mg/dL 1 37*   < > 3 07*   EGFR ml/min/1 73sq m 43   < > 16   CALCIUM mg/dL 11 1*   < > 9 7   AST U/L  --   --  30   ALT U/L  --   --  18   ALK PHOS U/L  --   --  78    < > = values in this interval not displayed       Results from last 7 days   Lab Units 10/03/22  1823   C DIFF TOXIN B BY PCR  Negative     Results from last 7 days   Lab Units 10/04/22  0444   PROCALCITONIN ng/ml 0 26*

## 2022-10-10 NOTE — OCCUPATIONAL THERAPY NOTE
Occupational Therapy Progress Note     Patient Name: Yanet Mascorro  Today's Date: 10/10/2022  Problem List  Principal Problem:    Toxic metabolic encephalopathy  Active Problems:    Depression    Tobacco abuse    DDD (degenerative disc disease), lumbosacral    ARF (acute renal failure) (HCC)    Transaminitis    Abnormal CT of the chest    Traumatic rhabdomyolysis (HCC)    D-dimer, elevated    Leg edema, right    Localized swelling of right upper extremity    Bacteremia due to methicillin susceptible Staphylococcus aureus (MSSA)    Pulmonary edema    Aspiration pneumonitis (HCC)    Hypokalemia    Hypernatremia    Diarrhea    Mild protein-calorie malnutrition ([facility] [address] )       10/10/22 1420   OT Last Visit   OT Visit Date 10/10/22   Note Type   Note Type Treatment   Pain Assessment   Pain Assessment Tool FLACC   Pain Rating: FLACC (Rest) - Face 0   Pain Rating: FLACC (Rest) - Legs 0   Pain Rating: FLACC (Rest) - Activity 0   Pain Rating: FLACC (Rest) - Cry 0   Pain Rating: FLACC (Rest) - Consolability 0   Score: FLACC (Rest) 0   Pain Rating: FLACC (Activity) - Face 1   Pain Rating: FLACC (Activity) - Legs 1   Pain Rating: FLACC (Activity) - Activity 1   Pain Rating: FLACC (Activity) - Cry 0   Pain Rating: FLACC (Activity) - Consolability 0   Score: FLACC (Activity) 3   Restrictions/Precautions   Weight Bearing Precautions Per Order No   Other Precautions Cognitive; Chair Alarm; Bed Alarm; Restraints; Fall Risk   ADL   Where Assessed Edge of bed   Eating Assistance 5  Supervision/Setup   Grooming Assistance 4  Minimal Assistance   Grooming Deficit Wash/dry hands; Wash/dry face;Setup;Verbal cueing;Supervision/safety; Increased time to complete   Grooming Comments Handed pt wipe with cuing to wash face/hands, with pt able to do with increased time to process and to complete task   LB Dressing Assistance 2  Maximal Assistance   LB Dressing Deficit Don/doff R sock; Don/doff L sock; Setup;Steadying   LB Dressing Comments Gave pt sock with instruction to don, with pt unable to initiate; partially donned sock on B feet, with pt able to then don the rest of the way with min VCs   Bed Mobility   Supine to Sit 4  Minimal assistance   Additional items Assist x 2;Bedrails; Increased time required; Impulsive;Verbal cues   Sit to Supine 4  Minimal assistance   Additional items Assist x 2;Bedrails; Increased time required; Impulsive;Verbal cues;LE management   Additional Comments pt able to sit unsupported at EOB ~7-8 min with close supervision for safety   Transfers   Sit to Stand 4  Minimal assistance   Additional items Assist x 2;Bedrails; Increased time required;Verbal cues   Stand to Sit 3  Moderate assistance   Additional items Assist x 2;Bedrails; Increased time required;Verbal cues  (Pressure applied to pelvis to initiate sitting d/t cognition)   Additional Comments verbal cues required for sequencing and proper hand placement; pt continues to have difficulty WBing through R LE   Functional Mobility   Functional Mobility 4  Minimal assistance   Additional Comments assist x1, 2nd person present for safety  with RW   Additional items Rolling walker   Cognition   Overall Cognitive Status Impaired   Arousal/Participation Persistent stimuli required; Alert   Attention Difficulty attending to directions   Orientation Level Oriented to person   Memory Decreased short term memory;Decreased recall of recent events;Decreased recall of precautions;Decreased recall of biographical information   Following Commands Follows one step commands with increased time or repetition   Comments requires constant redirection and cueing, pleasant; did not know date, but knew Halloween was in October; attended to more basic tasks such as engaging in donning socks; answering yes/no questions and making some 3-5 word sentences; more alert   Activity Tolerance   Activity Tolerance Patient limited by pain;Treatment limited secondary to medical complications (Comment)   Medical Staff Made Aware Emmanuel Carter PTA RN   Assessment   Assessment Patient participated in skilled OT session this date with interventions consisting of therapeutic activities, therapeutic exercises, neuro-reeducation, and self-care/ADL training to increase B UE strength, functional endurance/activity tolerance, static/dynamic sitting/standing balance, and overall safety awareness to increase (I) with ADLs/IADLs to return to PLOF  Provided education on HEP, energy conservation techniques, deep breathing techniques, fall prevention strategies, and overall safety awareness  Patient agreeable to OT treatment session, upon arrival patient was found supine in bed; restraints applied  Patient's significant other is present and offers pt's glasses, which are an older prescription  Donned to pt, and she reports it helps her see better  Pt more responsive to questioning today  She is not oriented to time, but when therapist stated that it is almost Halloween, she states "October!" Making 3-5 word sentences  Continues to demonstrate difficulty weight bearing through R LE, but S O  states she was having difficulty with that PTA due to a disc in her spine and was supposed to have surgical intervention before this happened  Patient requiring verbal cues for safety and verbal cues for pacing through activity steps  Refer to flowsheet for functional performance  Patient continues to be functioning below baseline level, occupational performance remains limited secondary to factors listed above and increased risk for falls and injury  The patient's raw score on the AM-PAC Daily Activity inpatient short form is 8, standardized score is 29 04, less than 39 4  Patients at this level are likely to benefit from discharge to post-acute rehabilitation services vs LTC  Please refer to the recommendation of the Occupational Therapist for safe discharge planning     Plan   Treatment Interventions ADL retraining;Functional transfer training;UE strengthening/ROM; Endurance training;Cognitive reorientation;Patient/family training;Equipment evaluation/education; Neuromuscular reeducation; Fine motor coordination activities; Compensatory technique education;Continued evaluation; Energy conservation; Activityengagement   Goal Expiration Date 10/18/22   OT Treatment Day 7   OT Frequency 3-5x/wk   Recommendation   OT Discharge Recommendation Post acute rehabilitation services   AM-PAC Daily Activity Inpatient   Lower Body Dressing 1   Bathing 1   Toileting 1   Upper Body Dressing 1   Grooming 2   Eating 2   Daily Activity Raw Score 8   Turning Head Towards Sound 2   Follow Simple Instructions 2   Low Function Daily Activity Raw Score 12   Low Function Daily Activity Standardized Score 21 38   AM-PAC Applied Cognition Inpatient   Following a Speech/Presentation 3   Understanding Ordinary Conversation 3   Taking Medications 1   Remembering Where Things Are Placed or Put Away 1   Remembering List of 4-5 Errands 1   Taking Care of Complicated Tasks 1   Applied Cognition Raw Score 10   Applied Cognition Standardized Score 24 98     [facility]

## 2022-10-10 NOTE — PROGRESS NOTES
Progress Note - Rai Ray 45 y o  female MRN: [id number]    Unit/Bed#: [facility] 2 -01 Encounter: [id number]      Subjective:  Patient is alert and answer some questions  She denies any pain  She did not eat much  She does not know where she is nor does she know what year it is  Physical Exam:   Temp:  [96 8 °F (36 °C)-97 5 °F (36 4 °C)] 97 5 °F (36 4 °C)  HR:  [106-112] 112  Resp:  [17-18] 18  BP: (120-123)/(85-93) 122/85    Gen:  Well-developed, frail, in no distress  Neck:  Supple  No lymphadenopathy, goiter, or bruit  Heart:  Regular rhythm  I heard no murmur, gallop, or rub  Lungs:  Clear to auscultation and percussion  No wheezing, rales, or rhonchi  Abd:  Soft with active bowel sounds  No mass, tenderness, or organomegaly  Extremities:  No clubbing, cyanosis, or edema  No calf tenderness  Neuro:  Alert but confused  Moves all limbs  Skin:  Warm and dry  LABS:   CBC:   Lab Results   Component Value Date    WBC 12 85 (H) 10/10/2022    HGB 11 6 10/10/2022    HCT 37 0 10/10/2022     (H) 10/10/2022     (H) 10/10/2022    MCH 31 9 10/10/2022    MCHC 31 4 10/10/2022    RDW 13 9 10/10/2022    MPV 11 9 10/10/2022    NRBC 0 10/10/2022   , CMP:   Lab Results   Component Value Date    SODIUM 140 10/10/2022    K 4 0 10/10/2022     10/10/2022    CO2 27 10/10/2022    BUN 20 10/10/2022    CREATININE 1 37 (H) 10/10/2022    CALCIUM 11 1 (H) 10/10/2022    EGFR 43 10/10/2022           Assessment/Plan:  1  Encephalopathy, likely related to multiple severe metabolic derangements at the time of admission  2  MSSA bacteremia  3  History of depression  4  Lumbar radiculopathy  5  Rhabdomyolysis, resolved  6  Acute renal failure on admission, resolved    The patient remains on IV cefazolin  She is not thought to be a very good candidate for CH[facility]LE  Therefore, she may need to stay on IV antibiotics for weeks  I reviewed this with ID    I believe that the patient most likely has had a brain injury related to her multiple metabolic derangements at the time of admission  It seems that she is slowly improving  I discussed the case with Neurology and ask them to review the situation including previous workup  I asked them to comment on their impression of the etiology of the patient's continued encephalopathy        VTE Pharmacologic Prophylaxis: Heparin  VTE Mechanical Prophylaxis: sequential compression device

## 2022-10-11 LAB
FOLATE SERPL-MCNC: 15.9 NG/ML (ref 3.1–17.5)
VIT B12 SERPL-MCNC: 658 PG/ML (ref 100–900)

## 2022-10-11 RX ORDER — VENLAFAXINE HYDROCHLORIDE 150 MG/1
150 CAPSULE, EXTENDED RELEASE ORAL DAILY
Status: DISCONTINUED | OUTPATIENT
Start: 2022-10-12 | End: 2022-10-18

## 2022-10-11 RX ORDER — GABAPENTIN 300 MG/1
300 CAPSULE ORAL 3 TIMES DAILY
Status: DISCONTINUED | OUTPATIENT
Start: 2022-10-11 | End: 2022-10-29

## 2022-10-11 RX ORDER — CEFAZOLIN SODIUM 2 G/50ML
2000 SOLUTION INTRAVENOUS EVERY 8 HOURS
Status: DISPENSED | OUTPATIENT
Start: 2022-10-11 | End: 2022-10-25

## 2022-10-11 RX ADMIN — GABAPENTIN 100 MG: 100 CAPSULE ORAL at 16:31

## 2022-10-11 RX ADMIN — GABAPENTIN 100 MG: 100 CAPSULE ORAL at 08:55

## 2022-10-11 RX ADMIN — MICONAZOLE NITRATE: 20 CREAM TOPICAL at 09:01

## 2022-10-11 RX ADMIN — HEPARIN SODIUM 5000 UNITS: 5000 INJECTION INTRAVENOUS; SUBCUTANEOUS at 21:42

## 2022-10-11 RX ADMIN — METOPROLOL TARTRATE 25 MG: 25 TABLET, FILM COATED ORAL at 21:42

## 2022-10-11 RX ADMIN — Medication 250 MG: at 17:46

## 2022-10-11 RX ADMIN — CEFAZOLIN SODIUM 2000 MG: 2 SOLUTION INTRAVENOUS at 02:58

## 2022-10-11 RX ADMIN — GABAPENTIN 300 MG: 300 CAPSULE ORAL at 21:42

## 2022-10-11 RX ADMIN — CEFAZOLIN SODIUM 2000 MG: 2 SOLUTION INTRAVENOUS at 22:37

## 2022-10-11 RX ADMIN — AMLODIPINE BESYLATE 10 MG: 10 TABLET ORAL at 08:55

## 2022-10-11 RX ADMIN — Medication 250 MG: at 08:55

## 2022-10-11 RX ADMIN — QUETIAPINE FUMARATE 50 MG: 25 TABLET ORAL at 08:54

## 2022-10-11 RX ADMIN — QUETIAPINE FUMARATE 50 MG: 25 TABLET ORAL at 17:46

## 2022-10-11 RX ADMIN — HEPARIN SODIUM 5000 UNITS: 5000 INJECTION INTRAVENOUS; SUBCUTANEOUS at 13:08

## 2022-10-11 RX ADMIN — METOPROLOL TARTRATE 25 MG: 25 TABLET, FILM COATED ORAL at 08:55

## 2022-10-11 RX ADMIN — MICONAZOLE NITRATE: 20 CREAM TOPICAL at 17:46

## 2022-10-11 RX ADMIN — HEPARIN SODIUM 5000 UNITS: 5000 INJECTION INTRAVENOUS; SUBCUTANEOUS at 06:20

## 2022-10-11 RX ADMIN — CEFAZOLIN SODIUM 2000 MG: 2 SOLUTION INTRAVENOUS at 13:08

## 2022-10-11 RX ADMIN — THIAMINE HYDROCHLORIDE 100 MG: 100 INJECTION, SOLUTION INTRAMUSCULAR; INTRAVENOUS at 21:42

## 2022-10-11 RX ADMIN — Medication 1 PATCH: at 09:00

## 2022-10-11 NOTE — PLAN OF CARE
Problem: PAIN - ADULT  Goal: Verbalizes/displays adequate comfort level or baseline comfort level  Description: Interventions:  - Encourage patient to monitor pain and request assistance  - Assess pain using appropriate pain scale  - Administer analgesics based on type and severity of pain and evaluate response  - Implement non-pharmacological measures as appropriate and evaluate response  - Consider cultural and social influences on pain and pain management  - Notify physician/advanced practitioner if interventions unsuccessful or patient reports new pain  Outcome: Progressing     Problem: INFECTION - ADULT  Goal: Absence or prevention of progression during hospitalization  Description: INTERVENTIONS:  - Assess and monitor for signs and symptoms of infection  - Monitor lab/diagnostic results  - Monitor all insertion sites, i e  indwelling lines, tubes, and drains  - Monitor endotracheal if appropriate and nasal secretions for changes in amount and color  - [place] appropriate cooling/warming therapies per order  - Administer medications as ordered  - Instruct and encourage patient and family to use good hand hygiene technique  - Identify and instruct in appropriate isolation precautions for identified infection/condition  Outcome: Progressing     Problem: SAFETY ADULT  Goal: Patient will remain free of falls  Description: INTERVENTIONS:  - Educate patient/family on patient safety including physical limitations  - Instruct patient to call for assistance with activity   - Consult OT/PT to assist with strengthening/mobility   - Keep Call bell within reach  - Keep bed low and locked with side rails adjusted as appropriate  - Keep care items and personal belongings within reach  - Initiate and maintain comfort rounds  - Make Fall Risk Sign visible to staff  - Offer Toileting every 2 Hours, in advance of need  - Initiate/Maintain bed alarm  - Obtain necessary fall risk management equipment: walker  - Apply yellow socks and bracelet for high fall risk patients  - Consider moving patient to room near nurses station  Outcome: Progressing  Goal: Maintain or return to baseline ADL function  Description: INTERVENTIONS:  -  Assess patient's ability to carry out ADLs; assess patient's baseline for ADL function and identify physical deficits which impact ability to perform ADLs (bathing, care of mouth/teeth, toileting, grooming, dressing, etc )  - Assess/evaluate cause of self-care deficits   - Assess range of motion  - Assess patient's mobility; develop plan if impaired  - Assess patient's need for assistive devices and provide as appropriate  - Encourage maximum independence but intervene and supervise when necessary  - Involve family in performance of ADLs  - Assess for home care needs following discharge   - Consider OT consult to assist with ADL evaluation and planning for discharge  - Provide patient education as appropriate  Outcome: Progressing  Goal: Maintains/Returns to pre admission functional level  Description: INTERVENTIONS:  - Perform BMAT or MOVE assessment daily    - Set and communicate daily mobility goal to care team and patient/family/caregiver  - Collaborate with rehabilitation services on mobility goals if consulted  - Perform Range of Motion 4-6 times a day  - Reposition patient every 2 hours    - Dangle patient 3-4 times a day  - Stand patient 3 times a day  - Ambulate patient 3-4 times a day  - Out of bed for toileting  - Record patient progress and toleration of activity level   Outcome: Progressing     Problem: DISCHARGE PLANNING  Goal: Discharge to home or other facility with appropriate resources  Description: INTERVENTIONS:  - Identify barriers to discharge w/patient and caregiver  - Arrange for needed discharge resources and transportation as appropriate  - Identify discharge learning needs (meds, wound care, etc )  - Arrange for interpretive services to assist at discharge as needed  - Refer to Case Management Department for coordinating discharge planning if the patient needs post-hospital services based on physician/advanced practitioner order or complex needs related to functional status, cognitive ability, or social support system  Outcome: Progressing     Problem: Knowledge Deficit  Goal: Patient/family/caregiver demonstrates understanding of disease process, treatment plan, medications, and discharge instructions  Description: Complete learning assessment and assess knowledge base    Interventions:  - Provide teaching at level of understanding  - Provide teaching via preferred learning methods  Outcome: Progressing     Problem: Potential for Falls  Goal: Patient will remain free of falls  Description: INTERVENTIONS:  - Educate patient/family on patient safety including physical limitations  - Instruct patient to call for assistance with activity   - Consult OT/PT to assist with strengthening/mobility   - Keep Call bell within reach  - Keep bed low and locked with side rails adjusted as appropriate  - Keep care items and personal belongings within reach  - Initiate and maintain comfort rounds  - Make Fall Risk Sign visible to staff  - Offer Toileting every 2 Hours, in advance of need  - Initiate/Maintain bed alarm  - Obtain necessary fall risk management equipment: walker  - Apply yellow socks and bracelet for high fall risk patients  - Consider moving patient to room near nurses station  Outcome: Progressing     Problem: MOBILITY - ADULT  Goal: Maintain or return to baseline ADL function  Description: INTERVENTIONS:  -  Assess patient's ability to carry out ADLs; assess patient's baseline for ADL function and identify physical deficits which impact ability to perform ADLs (bathing, care of mouth/teeth, toileting, grooming, dressing, etc )  - Assess/evaluate cause of self-care deficits   - Assess range of motion  - Assess patient's mobility; develop plan if impaired  - Assess patient's need for assistive devices and provide as appropriate  - Encourage maximum independence but intervene and supervise when necessary  - Involve family in performance of ADLs  - Assess for home care needs following discharge   - Consider OT consult to assist with ADL evaluation and planning for discharge  - Provide patient education as appropriate  Outcome: Progressing  Goal: Maintains/Returns to pre admission functional level  Description: INTERVENTIONS:  - Perform BMAT or MOVE assessment daily    - Set and communicate daily mobility goal to care team and patient/family/caregiver  - Collaborate with rehabilitation services on mobility goals if consulted  - Perform Range of Motion 4-6 times a day  - Reposition patient every 2 hours  - Dangle patient 3-4 times a day  - Stand patient 3 times a day  - Ambulate patient 3-4 times a day  - Out of bed for toileting  - Record patient progress and toleration of activity level   Outcome: Progressing     Problem: Prexisting or High Potential for Compromised Skin Integrity  Goal: Skin integrity is maintained or improved  Description: INTERVENTIONS:  - Identify patients at risk for skin breakdown  - Assess and monitor skin integrity  - Assess and monitor nutrition and hydration status  - Monitor labs   - Assess for incontinence   - Turn and reposition patient  - Assist with mobility/ambulation  - Relieve pressure over bony prominences  - Avoid friction and shearing  - Provide appropriate hygiene as needed including keeping skin clean and dry  - Evaluate need for skin moisturizer/barrier cream  - Collaborate with interdisciplinary team   - Patient/family teaching  - Consider wound care consult   Outcome: Progressing     Problem: Nutrition/Hydration-ADULT  Goal: Nutrient/Hydration intake appropriate for improving, restoring or maintaining nutritional needs  Description: Monitor and assess patient's nutrition/hydration status for malnutrition   Collaborate with interdisciplinary team and initiate plan and interventions as ordered  Monitor patient's weight and dietary intake as ordered or per policy  Utilize nutrition screening tool and intervene as necessary  Determine patient's food preferences and provide high-protein, high-caloric foods as appropriate  INTERVENTIONS:  - Monitor oral intake, urinary output, labs, and treatment plans  - Assess nutrition and hydration status and recommend course of action  - Evaluate amount of meals eaten  - Assist patient with eating if necessary   - Allow adequate time for meals  - Recommend/ encourage appropriate diets, oral nutritional supplements, and vitamin/mineral supplements  - Order, calculate, and assess calorie counts as needed  - Recommend, monitor, and adjust tube feedings and TPN/PPN based on assessed needs  - Assess need for intravenous fluids  - Provide specific nutrition/hydration education as appropriate  - Include patient/family/caregiver in decisions related to nutrition  Outcome: Progressing     Problem: NEUROSENSORY - ADULT  Goal: Achieves maximal functionality and self care  Description: INTERVENTIONS  - Monitor swallowing and airway patency with patient fatigue and changes in neurological status  - Encourage and assist patient to increase activity and self care     - Encourage visually impaired, hearing impaired and aphasic patients to use assistive/communication devices  Outcome: Progressing     Problem: CARDIOVASCULAR - ADULT  Goal: Maintains optimal cardiac output and hemodynamic stability  Description: INTERVENTIONS:  - Monitor I/O, vital signs and rhythm  - Monitor for S/S and trends of decreased cardiac output  - Administer and titrate ordered vasoactive medications to optimize hemodynamic stability  - Assess quality of pulses, skin color and temperature  - Assess for signs of decreased coronary artery perfusion  - Instruct patient to report change in severity of symptoms  Outcome: Progressing     Problem: RESPIRATORY - ADULT  Goal: Achieves optimal ventilation and oxygenation  Description: INTERVENTIONS:  - Assess for changes in respiratory status  - Assess for changes in mentation and behavior  - Position to facilitate oxygenation and minimize respiratory effort  - Oxygen administered by appropriate delivery if ordered  - Initiate smoking cessation education as indicated  - Encourage broncho-pulmonary hygiene including cough, deep breathe, Incentive Spirometry  - Assess the need for suctioning and aspirate as needed  - Assess and instruct to report SOB or any respiratory difficulty  - Respiratory Therapy support as indicated  Outcome: Progressing     Problem: GASTROINTESTINAL - ADULT  Goal: Maintains adequate nutritional intake  Description: INTERVENTIONS:  - Monitor percentage of each meal consumed  - Identify factors contributing to decreased intake, treat as appropriate  - Assist with meals as needed  - Monitor I&O, weight, and lab values if indicated  - Obtain nutrition services referral as needed  Outcome: Progressing     Problem: METABOLIC, FLUID AND ELECTROLYTES - ADULT  Goal: Electrolytes maintained within normal limits  Description: INTERVENTIONS:  - Monitor labs and assess patient for signs and symptoms of electrolyte imbalances  - Administer electrolyte replacement as ordered  - Monitor response to electrolyte replacements, including repeat lab results as appropriate  - Instruct patient on fluid and nutrition as appropriate  Outcome: Progressing  Goal: Fluid balance maintained  Description: INTERVENTIONS:  - Monitor labs   - Monitor I/O and WT  - Instruct patient on fluid and nutrition as appropriate  - Assess for signs & symptoms of volume excess or deficit  Outcome: Progressing     Problem: SKIN/TISSUE INTEGRITY - ADULT  Goal: Skin Integrity remains intact(Skin Breakdown Prevention)  Description: Assess:  -Perform Erickson assessment every shift  -Clean and moisturize skin every day  -Inspect skin when repositioning, toileting, and assisting with ADLS  -Assess extremities for adequate circulation and sensation     Bed Management:  -Have minimal linens on bed & keep smooth, unwrinkled  -Change linens as needed when moist or perspiring  -Avoid sitting or lying in one position for more than 2 hours while in bed  -Keep HOB at 45 degrees     Toileting:  -Offer bedside commode  -Assess for incontinence every 2 hours  -Use incontinent care products after each incontinent episode such as brief    Activity:  -Mobilize patient 3-4 times a day  -Encourage activity and walks on unit  -Encourage or provide ROM exercises   -Turn and reposition patient every 2 Hours  -Use appropriate equipment to lift or move patient in bed  -Instruct/ Assist with weight shifting every 2 when out of bed in chair  -Consider limitation of chair time 2 hour intervals    Skin Care:  -Avoid use of baby powder, tape, friction and shearing, hot water or constrictive clothing  -Relieve pressure over bony prominences using pillows  -Do not massage red bony areas    Next Steps:  -Teach patient strategies to minimize risks such as walker  -Consider consults to  interdisciplinary teams such as wound  Outcome: Progressing  Goal: Incision(s), wounds(s) or drain site(s) healing without S/S of infection  Description: INTERVENTIONS  - Assess and document dressing, incision, wound bed, drain sites and surrounding tissue  - Provide patient and family education  - Perform skin care/dressing changes every PRN  Outcome: Progressing     Problem: MUSCULOSKELETAL - ADULT  Goal: Maintain or return mobility to safest level of function  Description: INTERVENTIONS:  - Assess patient's ability to carry out ADLs; assess patient's baseline for ADL function and identify physical deficits which impact ability to perform ADLs (bathing, care of mouth/teeth, toileting, grooming, dressing, etc )  - Assess/evaluate cause of self-care deficits   - Assess range of motion  - Assess patient's mobility  - Assess patient's need for assistive devices and provide as appropriate  - Encourage maximum independence but intervene and supervise when necessary  - Involve family in performance of ADLs  - Assess for home care needs following discharge   - Consider OT consult to assist with ADL evaluation and planning for discharge  - Provide patient education as appropriate  Outcome: Progressing

## 2022-10-11 NOTE — PLAN OF CARE
Problem: PAIN - ADULT  Goal: Verbalizes/displays adequate comfort level or baseline comfort level  Description: Interventions:  - Encourage patient to monitor pain and request assistance  - Assess pain using appropriate pain scale  - Administer analgesics based on type and severity of pain and evaluate response  - Implement non-pharmacological measures as appropriate and evaluate response  - Consider cultural and social influences on pain and pain management  - Notify physician/advanced practitioner if interventions unsuccessful or patient reports new pain  Outcome: Progressing     Problem: INFECTION - ADULT  Goal: Absence or prevention of progression during hospitalization  Description: INTERVENTIONS:  - Assess and monitor for signs and symptoms of infection  - Monitor lab/diagnostic results  - Monitor all insertion sites, i e  indwelling lines, tubes, and drains  - Monitor endotracheal if appropriate and nasal secretions for changes in amount and color  - Century appropriate cooling/warming therapies per order  - Administer medications as ordered  - Instruct and encourage patient and family to use good hand hygiene technique  - Identify and instruct in appropriate isolation precautions for identified infection/condition  Outcome: Progressing     Problem: SAFETY ADULT  Goal: Patient will remain free of falls  Description: INTERVENTIONS:  - Educate patient/family on patient safety including physical limitations  - Instruct patient to call for assistance with activity   - Consult OT/PT to assist with strengthening/mobility   - Keep Call bell within reach  - Keep bed low and locked with side rails adjusted as appropriate  - Keep care items and personal belongings within reach  - Initiate and maintain comfort rounds  - Make Fall Risk Sign visible to staff  - Offer Toileting every 2 Hours, in advance of need  - Initiate/Maintain bed alarm  - Obtain necessary fall risk management equipment: walker  - Apply yellow socks and bracelet for high fall risk patients  - Consider moving patient to room near nurses station  Outcome: Progressing  Goal: Maintain or return to baseline ADL function  Description: INTERVENTIONS:  -  Assess patient's ability to carry out ADLs; assess patient's baseline for ADL function and identify physical deficits which impact ability to perform ADLs (bathing, care of mouth/teeth, toileting, grooming, dressing, etc )  - Assess/evaluate cause of self-care deficits   - Assess range of motion  - Assess patient's mobility; develop plan if impaired  - Assess patient's need for assistive devices and provide as appropriate  - Encourage maximum independence but intervene and supervise when necessary  - Involve family in performance of ADLs  - Assess for home care needs following discharge   - Consider OT consult to assist with ADL evaluation and planning for discharge  - Provide patient education as appropriate  Outcome: Progressing  Goal: Maintains/Returns to pre admission functional level  Description: INTERVENTIONS:  - Perform BMAT or MOVE assessment daily    - Set and communicate daily mobility goal to care team and patient/family/caregiver  - Collaborate with rehabilitation services on mobility goals if consulted  - Perform Range of Motion 4-6 times a day  - Reposition patient every 2 hours    - Dangle patient 3-4 times a day  - Stand patient 3 times a day  - Ambulate patient 3-4 times a day  - Out of bed for toileting  - Record patient progress and toleration of activity level   Outcome: Progressing     Problem: DISCHARGE PLANNING  Goal: Discharge to home or other facility with appropriate resources  Description: INTERVENTIONS:  - Identify barriers to discharge w/patient and caregiver  - Arrange for needed discharge resources and transportation as appropriate  - Identify discharge learning needs (meds, wound care, etc )  - Arrange for interpretive services to assist at discharge as needed  - Refer to Case Management Department for coordinating discharge planning if the patient needs post-hospital services based on physician/advanced practitioner order or complex needs related to functional status, cognitive ability, or social support system  Outcome: Progressing     Problem: Knowledge Deficit  Goal: Patient/family/caregiver demonstrates understanding of disease process, treatment plan, medications, and discharge instructions  Description: Complete learning assessment and assess knowledge base    Interventions:  - Provide teaching at level of understanding  - Provide teaching via preferred learning methods  Outcome: Progressing     Problem: Potential for Falls  Goal: Patient will remain free of falls  Description: INTERVENTIONS:  - Educate patient/family on patient safety including physical limitations  - Instruct patient to call for assistance with activity   - Consult OT/PT to assist with strengthening/mobility   - Keep Call bell within reach  - Keep bed low and locked with side rails adjusted as appropriate  - Keep care items and personal belongings within reach  - Initiate and maintain comfort rounds  - Make Fall Risk Sign visible to staff  - Offer Toileting every 2 Hours, in advance of need  - Initiate/Maintain bed alarm  - Obtain necessary fall risk management equipment: walker  - Apply yellow socks and bracelet for high fall risk patients  - Consider moving patient to room near nurses station  Outcome: Progressing     Problem: MOBILITY - ADULT  Goal: Maintain or return to baseline ADL function  Description: INTERVENTIONS:  -  Assess patient's ability to carry out ADLs; assess patient's baseline for ADL function and identify physical deficits which impact ability to perform ADLs (bathing, care of mouth/teeth, toileting, grooming, dressing, etc )  - Assess/evaluate cause of self-care deficits   - Assess range of motion  - Assess patient's mobility; develop plan if impaired  - Assess patient's need for assistive devices and provide as appropriate  - Encourage maximum independence but intervene and supervise when necessary  - Involve family in performance of ADLs  - Assess for home care needs following discharge   - Consider OT consult to assist with ADL evaluation and planning for discharge  - Provide patient education as appropriate  Outcome: Progressing  Goal: Maintains/Returns to pre admission functional level  Description: INTERVENTIONS:  - Perform BMAT or MOVE assessment daily    - Set and communicate daily mobility goal to care team and patient/family/caregiver  - Collaborate with rehabilitation services on mobility goals if consulted  - Perform Range of Motion 4-6 times a day  - Reposition patient every 2 hours  - Dangle patient 3-4 times a day  - Stand patient 3 times a day  - Ambulate patient 3-4 times a day  - Out of bed for toileting  - Record patient progress and toleration of activity level   Outcome: Progressing     Problem: Prexisting or High Potential for Compromised Skin Integrity  Goal: Skin integrity is maintained or improved  Description: INTERVENTIONS:  - Identify patients at risk for skin breakdown  - Assess and monitor skin integrity  - Assess and monitor nutrition and hydration status  - Monitor labs   - Assess for incontinence   - Turn and reposition patient  - Assist with mobility/ambulation  - Relieve pressure over bony prominences  - Avoid friction and shearing  - Provide appropriate hygiene as needed including keeping skin clean and dry  - Evaluate need for skin moisturizer/barrier cream  - Collaborate with interdisciplinary team   - Patient/family teaching  - Consider wound care consult   Outcome: Progressing     Problem: Nutrition/Hydration-ADULT  Goal: Nutrient/Hydration intake appropriate for improving, restoring or maintaining nutritional needs  Description: Monitor and assess patient's nutrition/hydration status for malnutrition   Collaborate with interdisciplinary team and initiate plan and interventions as ordered  Monitor patient's weight and dietary intake as ordered or per policy  Utilize nutrition screening tool and intervene as necessary  Determine patient's food preferences and provide high-protein, high-caloric foods as appropriate  INTERVENTIONS:  - Monitor oral intake, urinary output, labs, and treatment plans  - Assess nutrition and hydration status and recommend course of action  - Evaluate amount of meals eaten  - Assist patient with eating if necessary   - Allow adequate time for meals  - Recommend/ encourage appropriate diets, oral nutritional supplements, and vitamin/mineral supplements  - Order, calculate, and assess calorie counts as needed  - Recommend, monitor, and adjust tube feedings and TPN/PPN based on assessed needs  - Assess need for intravenous fluids  - Provide specific nutrition/hydration education as appropriate  - Include patient/family/caregiver in decisions related to nutrition  Outcome: Progressing     Problem: NEUROSENSORY - ADULT  Goal: Achieves maximal functionality and self care  Description: INTERVENTIONS  - Monitor swallowing and airway patency with patient fatigue and changes in neurological status  - Encourage and assist patient to increase activity and self care     - Encourage visually impaired, hearing impaired and aphasic patients to use assistive/communication devices  Outcome: Progressing     Problem: CARDIOVASCULAR - ADULT  Goal: Maintains optimal cardiac output and hemodynamic stability  Description: INTERVENTIONS:  - Monitor I/O, vital signs and rhythm  - Monitor for S/S and trends of decreased cardiac output  - Administer and titrate ordered vasoactive medications to optimize hemodynamic stability  - Assess quality of pulses, skin color and temperature  - Assess for signs of decreased coronary artery perfusion  - Instruct patient to report change in severity of symptoms  Outcome: Progressing     Problem: RESPIRATORY - ADULT  Goal: Achieves optimal ventilation and oxygenation  Description: INTERVENTIONS:  - Assess for changes in respiratory status  - Assess for changes in mentation and behavior  - Position to facilitate oxygenation and minimize respiratory effort  - Oxygen administered by appropriate delivery if ordered  - Initiate smoking cessation education as indicated  - Encourage broncho-pulmonary hygiene including cough, deep breathe, Incentive Spirometry  - Assess the need for suctioning and aspirate as needed  - Assess and instruct to report SOB or any respiratory difficulty  - Respiratory Therapy support as indicated  Outcome: Progressing     Problem: GASTROINTESTINAL - ADULT  Goal: Maintains adequate nutritional intake  Description: INTERVENTIONS:  - Monitor percentage of each meal consumed  - Identify factors contributing to decreased intake, treat as appropriate  - Assist with meals as needed  - Monitor I&O, weight, and lab values if indicated  - Obtain nutrition services referral as needed  Outcome: Progressing     Problem: METABOLIC, FLUID AND ELECTROLYTES - ADULT  Goal: Electrolytes maintained within normal limits  Description: INTERVENTIONS:  - Monitor labs and assess patient for signs and symptoms of electrolyte imbalances  - Administer electrolyte replacement as ordered  - Monitor response to electrolyte replacements, including repeat lab results as appropriate  - Instruct patient on fluid and nutrition as appropriate  Outcome: Progressing  Goal: Fluid balance maintained  Description: INTERVENTIONS:  - Monitor labs   - Monitor I/O and WT  - Instruct patient on fluid and nutrition as appropriate  - Assess for signs & symptoms of volume excess or deficit  Outcome: Progressing     Problem: SKIN/TISSUE INTEGRITY - ADULT  Goal: Skin Integrity remains intact(Skin Breakdown Prevention)  Description: Assess:  -Perform Erickson assessment every shift  -Clean and moisturize skin every day  -Inspect skin when repositioning, toileting, and assisting with ADLS  -Assess extremities for adequate circulation and sensation     Bed Management:  -Have minimal linens on bed & keep smooth, unwrinkled  -Change linens as needed when moist or perspiring  -Avoid sitting or lying in one position for more than 2 hours while in bed  -Keep HOB at 45 degrees     Toileting:  -Offer bedside commode  -Assess for incontinence every 2 hours  -Use incontinent care products after each incontinent episode such as brief    Activity:  -Mobilize patient 3-4 times a day  -Encourage activity and walks on unit  -Encourage or provide ROM exercises   -Turn and reposition patient every 2 Hours  -Use appropriate equipment to lift or move patient in bed  -Instruct/ Assist with weight shifting every 2 when out of bed in chair  -Consider limitation of chair time 2 hour intervals    Skin Care:  -Avoid use of baby powder, tape, friction and shearing, hot water or constrictive clothing  -Relieve pressure over bony prominences using pillows  -Do not massage red bony areas    Next Steps:  -Teach patient strategies to minimize risks such as walker  -Consider consults to  interdisciplinary teams such as wound  Outcome: Progressing  Goal: Incision(s), wounds(s) or drain site(s) healing without S/S of infection  Description: INTERVENTIONS  - Assess and document dressing, incision, wound bed, drain sites and surrounding tissue  - Provide patient and family education  - Perform skin care/dressing changes every PRN  Outcome: Progressing     Problem: MUSCULOSKELETAL - ADULT  Goal: Maintain or return mobility to safest level of function  Description: INTERVENTIONS:  - Assess patient's ability to carry out ADLs; assess patient's baseline for ADL function and identify physical deficits which impact ability to perform ADLs (bathing, care of mouth/teeth, toileting, grooming, dressing, etc )  - Assess/evaluate cause of self-care deficits   - Assess range of motion  - Assess patient's mobility  - Assess patient's need for assistive devices and provide as appropriate  - Encourage maximum independence but intervene and supervise when necessary  - Involve family in performance of ADLs  - Assess for home care needs following discharge   - Consider OT consult to assist with ADL evaluation and planning for discharge  - Provide patient education as appropriate  Outcome: Progressing

## 2022-10-11 NOTE — PROGRESS NOTES
Progress Note - Infectious Disease   Caryle Harsh 45 y o  female MRN: [id number]  Unit/Bed#: [place] [address] -[id number] Encounter: [id number]    Impression/Plan:  1  SIRS vs Sepsis  Evolving since admission  Fever, tachycardia, and mild leukocytosis  Suspect this is likely due to MSSA bacteremia  No other clear source appreciated  COVID-19 negative   UA benign   Status post lumbar puncture for altered mental status with negative ME panel   Patient has improved with no recurrent high fever spike  No lactic acidosis  This morning she is afebrile  Her WBC count has improved  Repeat blood cultures were negative >5 days  Mental status remains significantly altered   -antibiotics as below   -monitor CBCD and BMP  -monitor vitals  -supportive care      2  MSSA bacteremia  Patient was found down for prolonged period of time with evidence of multiple wounds on admission  Suspect cutaneous vs endovascular source, likely phlebitis as there is report of RUE erythema surrounding prior IV in addition to palpable cord in left antecub  TTE negative for obvious vegetation  CHERYLE was planned but then cancelled due to tenuous respiratory status  Repeat TTE still without good visualization of aortic valve  The patient is currently receiving IV cefazolin which she is tolerating without difficulty  Anticipate 4 weeks of IV antibiotic   -continue IV cefazolin through 10/25/2022 for 4 weeks of antibiotic treatment after cleared blood cultures  -weekly CBCD and creatinine while on IV antibiotic  -monitor vitals  -patient will require outpatient ID follow up after discharge, I will coordinate as needed and place information in discharge planning     3  Toxic Metabolic encephalopathy   Likely multifactorial etiology with uremia playing a role   This was initially thought to be secondary to gabapentin and morphine use in the setting of renal failure, hypotension, rhabdomyolysis   MRI brain negative   Lumbar puncture with opening pressure 26   Negative ME panel  CSF fluid culture not collected   The most likely to be secondary to metabolic issues, medication effect, and possible Wernicke's encephalopathy  Psychiatric issues also likely playing a role  Mental status remains significantly altered  Neurology is reassessing patient today   -serial neuro exams  -monitor cognition, mood, and mental status  -treat metabolic issues and psychiatric issues  -supportive care  -continue follow up with behavioral health  -continue follow up with neurology   -no additional ID workup needed     4  Acute renal failure   Creatinine on admission 5 88 with CK over 32,000  Creatinine reached plateau, now down trending   Likely multifactorial etiology in the setting of rhabdomyolysis and dehydration  Likely ANUJA/ATN    Most recent creatinine was improved to 1 37   -monitor creatinine  -dose adjust antibiotics for renal function as needed  -avoid nephrotoxins  -continue follow up with nephrology     5  Tobacco abuse   Encourage cessation as recommended by primary team   -NRT per primary service     6  Antibiotic allergy  Reports hives with penicillin  Patient has been tolerating cephalosporin without difficulty  -monitor patient for adverse medication reactions     Above plan was discussed in detail with patient at the bedside  Above plan was discussed in detail with SLIM  Antibiotics:  Cefazolin 14  Antibiotic 14    Subjective:  Still unable to perform ROS due to patient's confusion, restlessness  She does not respond directly to any of my questions but tells me "I'm tangled " She offers no new symptoms      Objective:  Vitals:  Temp:  [96 7 °F (35 9 °C)-97 5 °F (36 4 °C)] 96 7 °F (35 9 °C)  HR:  [105-112] 105  Resp:  [17-18] 18  BP: (122-137)/(85-93) 137/89  SpO2:  [98 %-100 %] 99 %  Temp (24hrs), Av 1 °F (36 2 °C), Min:96 7 °F (35 9 °C), Max:97 5 °F (36 4 °C)  Current: Temperature: (!) 96 7 °F (35 9 °C)    Physical Exam:   General Appearance:  Alert but confused with minimal verbal interactive  She appears restless, nontoxic, in no acute distress  Mitt and wrist restraints in place  Throat: Oropharynx moist without lesions  Lungs:   Clear to auscultation bilaterally; no wheezes, rhonchi or rales; respirations unlabored on room air  Heart:  Tachycardic; no murmur, rub or gallop  Abdomen:   Soft, non-tender, non-distended, positive bowel sounds  Extremities: No clubbing or cyanosis, no edema  Skin: No new rashes noted on exposed skin       Labs, Imaging, & Other studies:   All pertinent labs and imaging studies were personally reviewed  Results from last 7 days   Lab Units 10/10/22  0455 10/09/22  0432 10/08/22  0441   WBC Thousand/uL 12 85* 14 62* 12 39*   HEMOGLOBIN g/dL 11 6 11 1* 12 0   PLATELETS Thousands/uL 407* 368 361     Results from last 7 days   Lab Units 10/10/22  0455   POTASSIUM mmol/L 4 0   CHLORIDE mmol/L 104   CO2 mmol/L 27   BUN mg/dL 20   CREATININE mg/dL 1 37*   EGFR ml/min/1 73sq m 43   CALCIUM mg/dL 11 1*

## 2022-10-11 NOTE — PROGRESS NOTES
Progress Note - Neurology   Vianey June 45 y o  female [id number]  Unit/Bed#: [facility] 2 /[facility] 2 M*    Assessment/Plan:    * Toxic metabolic encephalopathy  Assessment & Plan  45 y o  female with chronic pain disorder with chronic opioid use, depression/anxiety, with prior self-injurious behavior and prior suicide attempt, h/o electroconvulsive therapy, HTN, HLD, daily tobacco use, prior cocaine use, and prior alcohol abuse, originally presented on 9/19 for altered mental status  · Patient suffered fall with head strike on 9/16  Initially appeared okay, but progressively became more lethargic/sedentary with decreased PO intake, therefore she was brought to the ED  Initial eval had shown that patient was alert, oriented to person and place only, and only answering questions with yes/no  · Workup on arrival:  · Found to have traumatic rhabdo, acute renal failure, transaminitis, and hyponatremia   · CTH was unremarkable  CT CAP revealed bilateral ground glass opacities  · UA negative, TSH WNL, COVID negative    · UDS positive for opioids (prescribed)   · Home gabapentin and morphine were held on admission due to ANUJA and encephalopathy  · Neurology was asked to evaluate on 9/23 due to persistent encephalopathy in the setting of improving metabolic derangements  · At baseline, patient described to be independent with ADLs, fully oriented, socializes with others  Does have underlying psychiatric history and substance abuse history as detailed above  Is prescribed chronic opioids for back pack and was scheduled to have spine surgery in the next month or so  · Neurologic workup completed during hospitalization:   · MRI brain was motion degraded, but no obvious signs of intracranial pathology  · Patient refused routine EEG   Did not re-attempt since suspicion for seizure was low/test likely low yield  · Lumbar puncture completed 9/24:   · Opening pressure 26  · Slight elevation in protein (48), though clinically unremarkable  WBC, RBC, Glucose, ACE, gram stain/culture, ME panel, ENC2 panel WNL  · Initially autoimmune encephalitis   · Neurology was asked to re-evaluate the patient on 10/11 due to persistent encephalopathy  Patient has undergone extensive neurologic workup on presentation, all of which have been unremarkable thus far  At this point, suspect persistent metabolic encephalopathy (?Wernicke's)  Do not suspect vascular event  Do not suspect ongoing seizures  Delirium may also be contributing, though not the primary issue or reason for AMS  Plan:  - Can repeat MRI brain to evaluate for any metabolic changes that could be present  - Serum labs:   · Check Vitamin B12, Folate, RPR  · Recheck vitamin B1  - Would recommend restarting thiamine after lab drawn  Can start with high dose IV treatment and then proceed with oral supplementation daily  - Can defer EEG  - No need for repeat LP or lab work at this time  - Medical management and supportive care per primary team  Correction of any metabolic or infectious disturbances  Traumatic rhabdomyolysis ([facility] [address] )  Assessment & Plan  - S/p fall in 9/16 followed by a sedentary behavior with poor oral intake  - Total CK 32,634-->1482 on 9/23 --> 624 on 9/29  - Acute renal failure on arrival, creatinine 5 88  Creatinine was 1 37 as of this AM         Recommendations for outpatient neurologic follow-up are yet to be determined  Subjective:   Patient was seen and examined  She is frail and cachectic appearing  She is in restraints  Patient actively resists eye opening and movement of her extremities  Mumbles to examiner but does not have any comprehensible speech            Past Medical History:   Diagnosis Date   • Chronic pain disorder    • Depression    • GERD (gastroesophageal reflux disease)    • History of electroconvulsive therapy    • Low back pain    • Self-injurious behavior    • Suicide attempt [facility])      Past Surgical History:   Procedure Laterality Date   •  SECTION     • COLONOSCOPY     • PANCREAS SURGERY      "pseudocysts" per patient's  Ricki Infante   • PA ESOPHAGOGASTRODUODENOSCOPY TRANSORAL DIAGNOSTIC N/A 4/10/2018    Procedure: EGD AND COLONOSCOPY;  Surgeon: Addie eLón MD;  [place] [facility]  GI LAB; Service: Gastroenterology     Family History   Problem Relation Age of Onset   • Arthritis Mother    • Coronary artery disease Mother         MI in her 63's   • Parkinsonism Father         with falls and SDH   • Parkinsonism Paternal Grandmother    • Coronary artery disease Paternal Grandfather    • Parkinsonism Paternal Aunt    • Colon cancer Family    • Depression Neg Hx      Social History     Socioeconomic History   • Marital status: /Civil Union     Spouse name: None   • Number of children: None   • Years of education: None   • Highest education level: None   Occupational History   • Occupation: disability   Tobacco Use   • Smoking status: Current Every Day Smoker     Packs/day: 0 50     Years: 35 00     Pack years: 17 50     Types: Cigarettes   • Smokeless tobacco: Never Used   Vaping Use   • Vaping Use: Never used   Substance and Sexual Activity   • Alcohol use: Yes     Comment: "occasional glass of wine"   • Drug use: Not Currently     Types: Marijuana, Cocaine     Comment: medical   • Sexual activity: Yes     Partners: Male     Birth control/protection: Post-menopausal     Comment: PT is    Other Topics Concern   • None   Social History Narrative    Lives with spouse     Social Determinants of Health     Financial Resource Strain: Not on file   Food Insecurity: No Food Insecurity   • Worried About Running Out of Food in the Last Year: Never true   • Ran Out of Food in the Last Year: Never true   Transportation Needs: No Transportation Needs   • Lack of Transportation (Medical): No   • Lack of Transportation (Non-Medical):  No   Physical Activity: Not on file   Stress: Not on file   Social Connections: Not on file Intimate Partner Violence: Not on file   Housing Stability: Low Risk    • Unable to Pay for Housing in the Last Year: No   • Number of Places Lived in the Last Year: 1   • Unstable Housing in the Last Year: No         Medications: All current active meds have been reviewed and current meds:  Scheduled Meds:  Current Facility-Administered Medications   Medication Dose Route Frequency Provider Last Rate   • acetaminophen  650 mg Oral Q6H PRN De Mega, DO     • ALPRAZolam  0 5 mg Oral HS PRN Yadira Sevilla MD     • amLODIPine  10 mg Oral Daily Christo Huitron MD     • cefazolin  2,000 mg Intravenous Q8H Trey Jimenez MD     • gabapentin  100 mg Oral TID Yadira Sevilla MD     • heparin (porcine)  5,000 Units Subcutaneous [facility] Zayda Adan,      • HYDROmorphone  0 5 mg Intravenous Q4H PRN Kirk Ayoub DO     • levalbuterol  1 25 mg Nebulization Q4H PRN Sam Kim DO     • loperamide  2 mg Oral 4x Daily PRN Yadira Sevilla MD     • metoprolol tartrate  25 mg Oral Q12H [facility] Inder Herring MD     • VANDANA ANTIFUNGAL   Topical BID Cornell King DO     • nicotine  1 patch Transdermal Daily Atul Adan DO     • OLANZapine  5 mg Oral Q6H PRN Cornell King DO     • ondansetron  4 mg Intravenous Q4H PRN Kirk Ayoub DO     • QUEtiapine  50 mg Oral BID Yadira Sevilla MD     • saccharomyces boulardii  250 mg Oral BID Yadira Sevilla MD       Continuous Infusions:   PRN Meds: •  acetaminophen  •  ALPRAZolam  •  HYDROmorphone  •  levalbuterol  •  loperamide  •  OLANZapine  •  ondansetron       ROS:   Review of Systems   Unable to perform ROS: Mental status change             Vitals:   /89   Pulse 101   Temp 98 °F (36 7 °C) (Axillary)   Resp 20   Ht 5' 6" (1 676 m)   Wt 51 7 kg (114 lb)   LMP 03/14/2019 (Approximate)   SpO2 98%   BMI 18 40 kg/m²     Physical Exam:   Vital signs and nursing notes reviewed  Constitutional: Ill appearing, cachectic  Restless appearing  Disheveled appearance  Wrist restraints in place  HENT: Normocephalic, atraumatic  Eyes:  Actively resisting eye opening  Cardiovascular: Tachycardic per review of telemetry  Pulmonary: No respiratory distress  Effort normal    Musculoskeletal: Moves all extremities spontaneously  Neurological: Detailed below  Skin: Warm and dry  Psychiatric: Cannot assess at this time due to mental status  Neurologic Exam:  Mental Status: Patient is lying in bed, restless, with eyes closed  Very briefly opens eyes to voice, then closes them again and actively resists eye opening  Cannot participate in orientation questions and does not follow commands  Cranial Nerves:   Cannot assess any degree of visual exam as patient does not open eyes long enough to assess or allow for examiner to do so  No obvious facial asymmetry   Remainder of CN testing limited by mental status  Motor: Spontaneous movements of all extremities  Coordination: Cannot assess due to mental status     No tremors noted during exam       Gait: Cannot assess due to mental status         Labs: I have personally reviewed pertinent reports  Imaging: I have personally reviewed pertinent imaging in PACS, including MRI, CTH, CT C spine,  and I have personally reviewed PACS reports  EKG, Pathology, and Other Studies: I have personally reviewed pertinent reports  VTE Prophylaxis: Heparin      Counseling / Coordination of Care  Total time spent today 30 minutes  Greater than 50% of total time was spent with the patient and/or family counseling and/or coordination of care  A description of the counseling/coordination of care:  Patient was seen and evaluated  Discussed with attending  Chart reviewed thoroughly including laboratory and imaging studies    Plan of care discussed with primary team

## 2022-10-11 NOTE — PROGRESS NOTES
Last appt 12/11/19  Last refilled info;  acetaminophen (TYLENOL) 325 MG tablet   2/9/2020     Sig - Route: Take 2 tablets by mouth every 4 hours as needed for Pain or Fever. - Oral    Class: Historical Med        Next appt 5/27/20  Refill unable to be completed per standing protocol due to; Historical medication.  Orders pended, and routed to provider for approval.      Progress Note - Rommel Figueredo 45 y o  female MRN: [id number]    Unit/Bed#: [facility] 2 -01 Encounter: [id number]      Subjective: The patient is awake  She had followed some commands  She was not able to answer questions coherently today  Physical Exam:   Temp:  [96 7 °F (35 9 °C)-98 °F (36 7 °C)] 98 °F (36 7 °C)  HR:  [101-105] 101  Resp:  [18-20] 20  BP: (137-140)/(89) 140/89    Gen:  Well-developed, frail, in no distress  Neck:  Supple  No lymphadenopathy, goiter, or bruit  Heart:  Regular rhythm  No murmur, gallop, or rub  Lungs:  Clear to auscultation and percussion  No wheezing, rales, or rhonchi  Abd:  Soft with active bowel sounds  No mass, tenderness, or organomegaly  Extremities:  No clubbing, cyanosis, or edema  No calf tenderness  Neuro:  Awake, moving all limbs, following some commands  Skin:  Warm and dry  LABS:  No new labs  Assessment/Plan:  1  Encephalopathy presumed related to multiple metabolic derangements at the time of admission, with consideration of Wernicke's encephalopathy  2  MSSA bacteremia  3  History of depression  4  Lumbar radiculopathy  5  Rhabdomyolysis, resolved  6  Acute renal failure on admission, resolved  7  Mild hypercalcemia   8  History of chronic narcotic therapy  9  Tobacco abuse    Clinically, the patient has not changed much over the last several days  Her creatinine is much better  Her calcium is mildly elevated but her other electrolytes are in good order  She remains on treatment for MSSA bacteremia  Neurology re-evaluation is appreciated  The patient has been treated during this hospitalization with high does thiamin but thiamin will be resumed  Repeat MRI has been suggested  This may necessitate general anesthesia  I will discuss this with the patient's   PTH level will be obtained  The patient's antidepressants will be resumed to see if this has any effect on her mental status      VTE Pharmacologic Prophylaxis: Heparin  VTE Mechanical Prophylaxis: sequential compression device

## 2022-10-12 LAB — RPR SER QL: NORMAL

## 2022-10-12 RX ADMIN — METOPROLOL TARTRATE 25 MG: 25 TABLET, FILM COATED ORAL at 08:45

## 2022-10-12 RX ADMIN — GABAPENTIN 300 MG: 300 CAPSULE ORAL at 16:25

## 2022-10-12 RX ADMIN — CEFAZOLIN SODIUM 2000 MG: 2 SOLUTION INTRAVENOUS at 05:13

## 2022-10-12 RX ADMIN — VENLAFAXINE HYDROCHLORIDE 150 MG: 150 CAPSULE, EXTENDED RELEASE ORAL at 08:45

## 2022-10-12 RX ADMIN — GABAPENTIN 300 MG: 300 CAPSULE ORAL at 08:46

## 2022-10-12 RX ADMIN — MICONAZOLE NITRATE: 20 CREAM TOPICAL at 17:43

## 2022-10-12 RX ADMIN — HEPARIN SODIUM 5000 UNITS: 5000 INJECTION INTRAVENOUS; SUBCUTANEOUS at 13:56

## 2022-10-12 RX ADMIN — HEPARIN SODIUM 5000 UNITS: 5000 INJECTION INTRAVENOUS; SUBCUTANEOUS at 06:24

## 2022-10-12 RX ADMIN — Medication 250 MG: at 08:45

## 2022-10-12 RX ADMIN — METOPROLOL TARTRATE 25 MG: 25 TABLET, FILM COATED ORAL at 20:05

## 2022-10-12 RX ADMIN — GABAPENTIN 300 MG: 300 CAPSULE ORAL at 20:05

## 2022-10-12 RX ADMIN — MICONAZOLE NITRATE: 20 CREAM TOPICAL at 08:46

## 2022-10-12 RX ADMIN — THIAMINE HYDROCHLORIDE 100 MG: 100 INJECTION, SOLUTION INTRAMUSCULAR; INTRAVENOUS at 21:01

## 2022-10-12 RX ADMIN — AMLODIPINE BESYLATE 10 MG: 10 TABLET ORAL at 08:39

## 2022-10-12 RX ADMIN — HEPARIN SODIUM 5000 UNITS: 5000 INJECTION INTRAVENOUS; SUBCUTANEOUS at 21:18

## 2022-10-12 RX ADMIN — CEFAZOLIN SODIUM 2000 MG: 2 SOLUTION INTRAVENOUS at 20:26

## 2022-10-12 RX ADMIN — CEFAZOLIN SODIUM 2000 MG: 2 SOLUTION INTRAVENOUS at 13:56

## 2022-10-12 RX ADMIN — QUETIAPINE FUMARATE 50 MG: 25 TABLET ORAL at 08:45

## 2022-10-12 RX ADMIN — Medication 1 PATCH: at 08:46

## 2022-10-12 RX ADMIN — ALPRAZOLAM 0.5 MG: 0.5 TABLET ORAL at 20:05

## 2022-10-12 RX ADMIN — QUETIAPINE FUMARATE 50 MG: 25 TABLET ORAL at 17:43

## 2022-10-12 RX ADMIN — Medication 250 MG: at 17:43

## 2022-10-12 NOTE — PROGRESS NOTES
Progress Note - Larry Powell 45 y o  female MRN: [id number]    Unit/Bed#: [place] [address] -01 Encounter: [id number]      Subjective:  I evaluated the patient in the morning  She was somewhat lethargic but arousable  She would follow some commands  She did not answer questions  The patient was re-evaluated in the presence of her family in the afternoon and was much more interactive  She did talk some with her family  I had a long conversation with patient's   I reviewed the history in detail  He states that his wife has been having difficulty because of lumbar radiculopathy and at times would need help in the shower because of instability of her right leg  Other than this, she was fully independent with her activities of daily living  She was able to drive  She is pain the bills  About 1 month before admission she was started on morphine  Nhoemi Mcarthur had proven ineffective  She was also on gabapentin for quite some time  Three days before admission she fell  She injured her left leg  The patient was not on the floor for 3 days as noted elsewhere in the chart  The patient's  said that with help she was able to get up and around  However over the next several days she became progressively somnolent  Ultimately, she was brought to the emergency room and was found to be in acute renal failure with significant lethargy  The patient's  thinks that she improved somewhat during the 1st week of her hospitalization  Initially she was quite lethargic but this got somewhat better  Thereafter, he thinks that she became rather abruptly agitated  This may be temporally related to the development of staph sepsis  Since that time, he thinks that she has gradually improved  It has been the observation the nursing staff that she is much better when he is present      Physical Exam:   Temp:  [97 1 °F (36 2 °C)-97 8 °F (36 6 °C)] 97 1 °F (36 2 °C)  HR:  [] 96  Resp:  [16-18] 16  BP: (102-135)/(67-88) 102/68    Gen:  Well-developed, frail, in no distress  Neck:  Supple  No lymphadenopathy, goiter, or bruit  Heart:  Regular rhythm  I heard no murmur, gallop, or rub  Lungs:  Clear to auscultation percussion  No wheezing, rales, or rhonchi  Abd:  Soft with active bowel sounds  No mass, tenderness, organomegaly  Extremities:  No clubbing, cyanosis, or edema  No calf tenderness  Neuro:  Intermittently lethargic  Able to move all limbs  Skin:  Warm and dry  LABS:  B12, folate, and RPR were normal   B1 level is pending  Assessment/Plan:  1  Encephalopathy likely related to multiple metabolic derangements at the time of admission  2  MSSA bacteremia  3  History of depression  4  Lumbar radiculopathy  5  Rhabdomyolysis, resolved  6  Acute renal failure on admission, resolved  7  Mild hypercalcemia  8  History of chronic narcotic therapy  9  Tobacco abuse     In light of the above information and after reviewing the situation with Neuro in ID, the plan is as follows  The patient will continue on IV antibiotics  She will continue to receive physical and occupational therapy  Her antidepressants have been reinstituted  She will be scheduled for an MRI of the brain under anesthesia  This will require transport to [facility]  I have requested a PMR consultation  In light of the patient's improvement when seen this afternoon, she may at some point be a candidate for acute rehab  Obviously, her physical rehabilitation will be somewhat limited by her lumbar radiculopathy  We will make every effort to avoid unnecessary restraint  Hopefully, the adjustments of her psychiatric regimen will facilitate this  Evaluation of her hypercalcemia is planned  Overall, I spent 1 hour and 15 minutes with the patient and her family today      VTE Pharmacologic Prophylaxis: Heparin  VTE Mechanical Prophylaxis: sequential compression device

## 2022-10-12 NOTE — PLAN OF CARE
Problem: PAIN - ADULT  Goal: Verbalizes/displays adequate comfort level or baseline comfort level  Description: Interventions:  - Encourage patient to monitor pain and request assistance  - Assess pain using appropriate pain scale  - Administer analgesics based on type and severity of pain and evaluate response  - Implement non-pharmacological measures as appropriate and evaluate response  - Consider cultural and social influences on pain and pain management  - Notify physician/advanced practitioner if interventions unsuccessful or patient reports new pain  Outcome: Progressing     Problem: INFECTION - ADULT  Goal: Absence or prevention of progression during hospitalization  Description: INTERVENTIONS:  - Assess and monitor for signs and symptoms of infection  - Monitor lab/diagnostic results  - Monitor all insertion sites, i e  indwelling lines, tubes, and drains  - Monitor endotracheal if appropriate and nasal secretions for changes in amount and color  - [place] appropriate cooling/warming therapies per order  - Administer medications as ordered  - Instruct and encourage patient and family to use good hand hygiene technique  - Identify and instruct in appropriate isolation precautions for identified infection/condition  Outcome: Progressing     Problem: SAFETY ADULT  Goal: Patient will remain free of falls  Description: INTERVENTIONS:  - Educate patient/family on patient safety including physical limitations  - Instruct patient to call for assistance with activity   - Consult OT/PT to assist with strengthening/mobility   - Keep Call bell within reach  - Keep bed low and locked with side rails adjusted as appropriate  - Keep care items and personal belongings within reach  - Initiate and maintain comfort rounds  - Make Fall Risk Sign visible to staff  - Offer Toileting every 2 Hours, in advance of need  - Initiate/Maintain bed alarm  - Obtain necessary fall risk management equipment: walker  - Apply yellow socks and bracelet for high fall risk patients  - Consider moving patient to room near nurses station  Outcome: Progressing  Goal: Maintain or return to baseline ADL function  Description: INTERVENTIONS:  -  Assess patient's ability to carry out ADLs; assess patient's baseline for ADL function and identify physical deficits which impact ability to perform ADLs (bathing, care of mouth/teeth, toileting, grooming, dressing, etc )  - Assess/evaluate cause of self-care deficits   - Assess range of motion  - Assess patient's mobility; develop plan if impaired  - Assess patient's need for assistive devices and provide as appropriate  - Encourage maximum independence but intervene and supervise when necessary  - Involve family in performance of ADLs  - Assess for home care needs following discharge   - Consider OT consult to assist with ADL evaluation and planning for discharge  - Provide patient education as appropriate  Outcome: Progressing  Goal: Maintains/Returns to pre admission functional level  Description: INTERVENTIONS:  - Perform BMAT or MOVE assessment daily    - Set and communicate daily mobility goal to care team and patient/family/caregiver  - Collaborate with rehabilitation services on mobility goals if consulted  - Perform Range of Motion 4-6 times a day  - Reposition patient every 2 hours    - Dangle patient 3-4 times a day  - Stand patient 3 times a day  - Ambulate patient 3-4 times a day  - Out of bed for toileting  - Record patient progress and toleration of activity level   Outcome: Progressing     Problem: DISCHARGE PLANNING  Goal: Discharge to home or other facility with appropriate resources  Description: INTERVENTIONS:  - Identify barriers to discharge w/patient and caregiver  - Arrange for needed discharge resources and transportation as appropriate  - Identify discharge learning needs (meds, wound care, etc )  - Arrange for interpretive services to assist at discharge as needed  - Refer to Case Management Department for coordinating discharge planning if the patient needs post-hospital services based on physician/advanced practitioner order or complex needs related to functional status, cognitive ability, or social support system  Outcome: Progressing     Problem: Knowledge Deficit  Goal: Patient/family/caregiver demonstrates understanding of disease process, treatment plan, medications, and discharge instructions  Description: Complete learning assessment and assess knowledge base    Interventions:  - Provide teaching at level of understanding  - Provide teaching via preferred learning methods  Outcome: Progressing     Problem: Potential for Falls  Goal: Patient will remain free of falls  Description: INTERVENTIONS:  - Educate patient/family on patient safety including physical limitations  - Instruct patient to call for assistance with activity   - Consult OT/PT to assist with strengthening/mobility   - Keep Call bell within reach  - Keep bed low and locked with side rails adjusted as appropriate  - Keep care items and personal belongings within reach  - Initiate and maintain comfort rounds  - Make Fall Risk Sign visible to staff  - Offer Toileting every 2 Hours, in advance of need  - Initiate/Maintain bed alarm  - Obtain necessary fall risk management equipment: walker  - Apply yellow socks and bracelet for high fall risk patients  - Consider moving patient to room near nurses station  Outcome: Progressing     Problem: MOBILITY - ADULT  Goal: Maintain or return to baseline ADL function  Description: INTERVENTIONS:  -  Assess patient's ability to carry out ADLs; assess patient's baseline for ADL function and identify physical deficits which impact ability to perform ADLs (bathing, care of mouth/teeth, toileting, grooming, dressing, etc )  - Assess/evaluate cause of self-care deficits   - Assess range of motion  - Assess patient's mobility; develop plan if impaired  - Assess patient's need for assistive devices and provide as appropriate  - Encourage maximum independence but intervene and supervise when necessary  - Involve family in performance of ADLs  - Assess for home care needs following discharge   - Consider OT consult to assist with ADL evaluation and planning for discharge  - Provide patient education as appropriate  Outcome: Progressing  Goal: Maintains/Returns to pre admission functional level  Description: INTERVENTIONS:  - Perform BMAT or MOVE assessment daily    - Set and communicate daily mobility goal to care team and patient/family/caregiver  - Collaborate with rehabilitation services on mobility goals if consulted  - Perform Range of Motion 4-6 times a day  - Reposition patient every 2 hours  - Dangle patient 3-4 times a day  - Stand patient 3 times a day  - Ambulate patient 3-4 times a day  - Out of bed for toileting  - Record patient progress and toleration of activity level   Outcome: Progressing     Problem: Prexisting or High Potential for Compromised Skin Integrity  Goal: Skin integrity is maintained or improved  Description: INTERVENTIONS:  - Identify patients at risk for skin breakdown  - Assess and monitor skin integrity  - Assess and monitor nutrition and hydration status  - Monitor labs   - Assess for incontinence   - Turn and reposition patient  - Assist with mobility/ambulation  - Relieve pressure over bony prominences  - Avoid friction and shearing  - Provide appropriate hygiene as needed including keeping skin clean and dry  - Evaluate need for skin moisturizer/barrier cream  - Collaborate with interdisciplinary team   - Patient/family teaching  - Consider wound care consult   Outcome: Progressing     Problem: Nutrition/Hydration-ADULT  Goal: Nutrient/Hydration intake appropriate for improving, restoring or maintaining nutritional needs  Description: Monitor and assess patient's nutrition/hydration status for malnutrition   Collaborate with interdisciplinary team and initiate plan and interventions as ordered  Monitor patient's weight and dietary intake as ordered or per policy  Utilize nutrition screening tool and intervene as necessary  Determine patient's food preferences and provide high-protein, high-caloric foods as appropriate  INTERVENTIONS:  - Monitor oral intake, urinary output, labs, and treatment plans  - Assess nutrition and hydration status and recommend course of action  - Evaluate amount of meals eaten  - Assist patient with eating if necessary   - Allow adequate time for meals  - Recommend/ encourage appropriate diets, oral nutritional supplements, and vitamin/mineral supplements  - Order, calculate, and assess calorie counts as needed  - Recommend, monitor, and adjust tube feedings and TPN/PPN based on assessed needs  - Assess need for intravenous fluids  - Provide specific nutrition/hydration education as appropriate  - Include patient/family/caregiver in decisions related to nutrition  Outcome: Progressing     Problem: NEUROSENSORY - ADULT  Goal: Achieves maximal functionality and self care  Description: INTERVENTIONS  - Monitor swallowing and airway patency with patient fatigue and changes in neurological status  - Encourage and assist patient to increase activity and self care     - Encourage visually impaired, hearing impaired and aphasic patients to use assistive/communication devices  Outcome: Progressing  Goal: Achieves stable or improved neurological status  Description: INTERVENTIONS  - Monitor and report changes in neurological status  - Monitor vital signs such as temperature, blood pressure, glucose, and any other labs ordered   - Initiate measures to prevent increased intracranial pressure  - Monitor for seizure activity and implement precautions if appropriate      Outcome: Progressing     Problem: CARDIOVASCULAR - ADULT  Goal: Maintains optimal cardiac output and hemodynamic stability  Description: INTERVENTIONS:  - Monitor I/O, vital signs and rhythm  - Monitor for S/S and trends of decreased cardiac output  - Administer and titrate ordered vasoactive medications to optimize hemodynamic stability  - Assess quality of pulses, skin color and temperature  - Assess for signs of decreased coronary artery perfusion  - Instruct patient to report change in severity of symptoms  Outcome: Progressing     Problem: RESPIRATORY - ADULT  Goal: Achieves optimal ventilation and oxygenation  Description: INTERVENTIONS:  - Assess for changes in respiratory status  - Assess for changes in mentation and behavior  - Position to facilitate oxygenation and minimize respiratory effort  - Oxygen administered by appropriate delivery if ordered  - Initiate smoking cessation education as indicated  - Encourage broncho-pulmonary hygiene including cough, deep breathe, Incentive Spirometry  - Assess the need for suctioning and aspirate as needed  - Assess and instruct to report SOB or any respiratory difficulty  - Respiratory Therapy support as indicated  Outcome: Progressing     Problem: GASTROINTESTINAL - ADULT  Goal: Maintains adequate nutritional intake  Description: INTERVENTIONS:  - Monitor percentage of each meal consumed  - Identify factors contributing to decreased intake, treat as appropriate  - Assist with meals as needed  - Monitor I&O, weight, and lab values if indicated  - Obtain nutrition services referral as needed  Outcome: Progressing     Problem: METABOLIC, FLUID AND ELECTROLYTES - ADULT  Goal: Electrolytes maintained within normal limits  Description: INTERVENTIONS:  - Monitor labs and assess patient for signs and symptoms of electrolyte imbalances  - Administer electrolyte replacement as ordered  - Monitor response to electrolyte replacements, including repeat lab results as appropriate  - Instruct patient on fluid and nutrition as appropriate  Outcome: Progressing  Goal: Fluid balance maintained  Description: INTERVENTIONS:  - Monitor labs   - Monitor I/O and WT  - Instruct patient on fluid and nutrition as appropriate  - Assess for signs & symptoms of volume excess or deficit  Outcome: Progressing     Problem: SKIN/TISSUE INTEGRITY - ADULT  Goal: Skin Integrity remains intact(Skin Breakdown Prevention)  Description: Assess:  -Perform Erickson assessment every shift  -Clean and moisturize skin every day  -Inspect skin when repositioning, toileting, and assisting with ADLS  -Assess extremities for adequate circulation and sensation     Bed Management:  -Have minimal linens on bed & keep smooth, unwrinkled  -Change linens as needed when moist or perspiring  -Avoid sitting or lying in one position for more than 2 hours while in bed  -Keep HOB at 45 degrees     Toileting:  -Offer bedside commode  -Assess for incontinence every 2 hours  -Use incontinent care products after each incontinent episode such as brief    Activity:  -Mobilize patient 3-4 times a day  -Encourage activity and walks on unit  -Encourage or provide ROM exercises   -Turn and reposition patient every 2 Hours  -Use appropriate equipment to lift or move patient in bed  -Instruct/ Assist with weight shifting every 2 when out of bed in chair  -Consider limitation of chair time 2 hour intervals    Skin Care:  -Avoid use of baby powder, tape, friction and shearing, hot water or constrictive clothing  -Relieve pressure over bony prominences using pillows  -Do not massage red bony areas    Next Steps:  -Teach patient strategies to minimize risks such as walker  -Consider consults to  interdisciplinary teams such as wound  Outcome: Progressing  Goal: Incision(s), wounds(s) or drain site(s) healing without S/S of infection  Description: INTERVENTIONS  - Assess and document dressing, incision, wound bed, drain sites and surrounding tissue  - Provide patient and family education  - Perform skin care/dressing changes every PRN  Outcome: Progressing     Problem: MUSCULOSKELETAL - ADULT  Goal: Maintain or return mobility to safest level of function  Description: INTERVENTIONS:  - Assess patient's ability to carry out ADLs; assess patient's baseline for ADL function and identify physical deficits which impact ability to perform ADLs (bathing, care of mouth/teeth, toileting, grooming, dressing, etc )  - Assess/evaluate cause of self-care deficits   - Assess range of motion  - Assess patient's mobility  - Assess patient's need for assistive devices and provide as appropriate  - Encourage maximum independence but intervene and supervise when necessary  - Involve family in performance of ADLs  - Assess for home care needs following discharge   - Consider OT consult to assist with ADL evaluation and planning for discharge  - Provide patient education as appropriate  Outcome: Progressing

## 2022-10-12 NOTE — SPEECH THERAPY NOTE
Speech Language/Pathology    Speech/Language Pathology Progress Note    Patient Name: Nelson Suárez  Today's Date: 10/12/2022     Problem List  Principal Problem:    Toxic metabolic encephalopathy  Active Problems:    Depression    Tobacco abuse    DDD (degenerative disc disease), lumbosacral    ARF (acute renal failure) (HCC)    Transaminitis    Abnormal CT of the chest    Traumatic rhabdomyolysis (HCC)    D-dimer, elevated    Leg edema, right    Localized swelling of right upper extremity    Bacteremia due to methicillin susceptible Staphylococcus aureus (MSSA)    Pulmonary edema    Aspiration pneumonitis (HCC)    Hypokalemia    Hypernatremia    Diarrhea    Mild protein-calorie malnutrition (HCC)       Past Medical History  Past Medical History:   Diagnosis Date   • Chronic pain disorder    • Depression    • GERD (gastroesophageal reflux disease)    • History of electroconvulsive therapy    • Low back pain    • Self-injurious behavior    • Suicide attempt [facility])         Past Surgical History  Past Surgical History:   Procedure Laterality Date   •  SECTION     • COLONOSCOPY     • PANCREAS SURGERY      "pseudocysts" per patient's  iRcki Infante   • [place] ESOPHAGOGASTRODUODENOSCOPY TRANSORAL DIAGNOSTIC N/A 4/10/2018    Procedure: EGD AND COLONOSCOPY;  Surgeon: Jordan Cutler MD;  [place] [facility]; Service: Gastroenterology         Subjective:  Assisted nurse w/ transfer to bedside commode prior to session  Noted diarrhea  Pt more alert this session  (, daughter, and 2 other people were visiting)  Objective:  Pt seen for po tolerance and further recommendations   was feeding pt small bites of jello  Report pt has a h/o reduced tolerance for dairy but is ok now  Visitors state pt likes pizza and steak hoagies, but it was not uncommon for her to just make mashed potatoes or stuffing to eat  (has been refusing the mashed potatoes here)  agreeable to a choclate chip cookie today   Held it in her hand for a while  Took a somewhat large bite  Slowly masticated it and transferred  No cough or s/s difficulty  Handed the rest of it back to me  Took liquids by straw wnl  Assessment:  Most alert I have seen her this far  Also more verbal but does not always answer questions  Accepted a portion of a [place] chip cookie  Plan/Recommendations:  Upgrade to a dysphagia 3 and thin lqiuid diet  Wrote food preferences (per family) on aspiration sign posted outside door so that  would see  Also spoke w/ dietician, nurse, physician

## 2022-10-12 NOTE — PLAN OF CARE
Problem: PAIN - ADULT  Goal: Verbalizes/displays adequate comfort level or baseline comfort level  Description: Interventions:  - Encourage patient to monitor pain and request assistance  - Assess pain using appropriate pain scale  - Administer analgesics based on type and severity of pain and evaluate response  - Implement non-pharmacological measures as appropriate and evaluate response  - Consider cultural and social influences on pain and pain management  - Notify physician/advanced practitioner if interventions unsuccessful or patient reports new pain  Outcome: Progressing     Problem: INFECTION - ADULT  Goal: Absence or prevention of progression during hospitalization  Description: INTERVENTIONS:  - Assess and monitor for signs and symptoms of infection  - Monitor lab/diagnostic results  - Monitor all insertion sites, i e  indwelling lines, tubes, and drains  - Monitor endotracheal if appropriate and nasal secretions for changes in amount and color  - [place] appropriate cooling/warming therapies per order  - Administer medications as ordered  - Instruct and encourage patient and family to use good hand hygiene technique  - Identify and instruct in appropriate isolation precautions for identified infection/condition  Outcome: Progressing     Problem: SAFETY ADULT  Goal: Patient will remain free of falls  Description: INTERVENTIONS:  - Educate patient/family on patient safety including physical limitations  - Instruct patient to call for assistance with activity   - Consult OT/PT to assist with strengthening/mobility   - Keep Call bell within reach  - Keep bed low and locked with side rails adjusted as appropriate  - Keep care items and personal belongings within reach  - Initiate and maintain comfort rounds  - Make Fall Risk Sign visible to staff  - Offer Toileting every 2 Hours, in advance of need  - Initiate/Maintain bed alarm  - Obtain necessary fall risk management equipment: walker  - Apply yellow socks and bracelet for high fall risk patients  - Consider moving patient to room near nurses station  Outcome: Progressing  Goal: Maintain or return to baseline ADL function  Description: INTERVENTIONS:  -  Assess patient's ability to carry out ADLs; assess patient's baseline for ADL function and identify physical deficits which impact ability to perform ADLs (bathing, care of mouth/teeth, toileting, grooming, dressing, etc )  - Assess/evaluate cause of self-care deficits   - Assess range of motion  - Assess patient's mobility; develop plan if impaired  - Assess patient's need for assistive devices and provide as appropriate  - Encourage maximum independence but intervene and supervise when necessary  - Involve family in performance of ADLs  - Assess for home care needs following discharge   - Consider OT consult to assist with ADL evaluation and planning for discharge  - Provide patient education as appropriate  Outcome: Progressing  Goal: Maintains/Returns to pre admission functional level  Description: INTERVENTIONS:  - Perform BMAT or MOVE assessment daily    - Set and communicate daily mobility goal to care team and patient/family/caregiver  - Collaborate with rehabilitation services on mobility goals if consulted  - Perform Range of Motion 4-6 times a day  - Reposition patient every 2 hours    - Dangle patient 3-4 times a day  - Stand patient 3 times a day  - Ambulate patient 3-4 times a day  - Out of bed for toileting  - Record patient progress and toleration of activity level   Outcome: Progressing     Problem: DISCHARGE PLANNING  Goal: Discharge to home or other facility with appropriate resources  Description: INTERVENTIONS:  - Identify barriers to discharge w/patient and caregiver  - Arrange for needed discharge resources and transportation as appropriate  - Identify discharge learning needs (meds, wound care, etc )  - Arrange for interpretive services to assist at discharge as needed  - Refer to Case Management Department for coordinating discharge planning if the patient needs post-hospital services based on physician/advanced practitioner order or complex needs related to functional status, cognitive ability, or social support system  Outcome: Progressing     Problem: Knowledge Deficit  Goal: Patient/family/caregiver demonstrates understanding of disease process, treatment plan, medications, and discharge instructions  Description: Complete learning assessment and assess knowledge base    Interventions:  - Provide teaching at level of understanding  - Provide teaching via preferred learning methods  Outcome: Progressing     Problem: Potential for Falls  Goal: Patient will remain free of falls  Description: INTERVENTIONS:  - Educate patient/family on patient safety including physical limitations  - Instruct patient to call for assistance with activity   - Consult OT/PT to assist with strengthening/mobility   - Keep Call bell within reach  - Keep bed low and locked with side rails adjusted as appropriate  - Keep care items and personal belongings within reach  - Initiate and maintain comfort rounds  - Make Fall Risk Sign visible to staff  - Offer Toileting every 2 Hours, in advance of need  - Initiate/Maintain bed alarm  - Obtain necessary fall risk management equipment: walker  - Apply yellow socks and bracelet for high fall risk patients  - Consider moving patient to room near nurses station  Outcome: Progressing     Problem: MOBILITY - ADULT  Goal: Maintain or return to baseline ADL function  Description: INTERVENTIONS:  -  Assess patient's ability to carry out ADLs; assess patient's baseline for ADL function and identify physical deficits which impact ability to perform ADLs (bathing, care of mouth/teeth, toileting, grooming, dressing, etc )  - Assess/evaluate cause of self-care deficits   - Assess range of motion  - Assess patient's mobility; develop plan if impaired  - Assess patient's need for assistive devices and provide as appropriate  - Encourage maximum independence but intervene and supervise when necessary  - Involve family in performance of ADLs  - Assess for home care needs following discharge   - Consider OT consult to assist with ADL evaluation and planning for discharge  - Provide patient education as appropriate  Outcome: Progressing  Goal: Maintains/Returns to pre admission functional level  Description: INTERVENTIONS:  - Perform BMAT or MOVE assessment daily    - Set and communicate daily mobility goal to care team and patient/family/caregiver  - Collaborate with rehabilitation services on mobility goals if consulted  - Perform Range of Motion 4-6 times a day  - Reposition patient every 2 hours  - Dangle patient 3-4 times a day  - Stand patient 3 times a day  - Ambulate patient 3-4 times a day  - Out of bed for toileting  - Record patient progress and toleration of activity level   Outcome: Progressing     Problem: Prexisting or High Potential for Compromised Skin Integrity  Goal: Skin integrity is maintained or improved  Description: INTERVENTIONS:  - Identify patients at risk for skin breakdown  - Assess and monitor skin integrity  - Assess and monitor nutrition and hydration status  - Monitor labs   - Assess for incontinence   - Turn and reposition patient  - Assist with mobility/ambulation  - Relieve pressure over bony prominences  - Avoid friction and shearing  - Provide appropriate hygiene as needed including keeping skin clean and dry  - Evaluate need for skin moisturizer/barrier cream  - Collaborate with interdisciplinary team   - Patient/family teaching  - Consider wound care consult   Outcome: Progressing     Problem: Nutrition/Hydration-ADULT  Goal: Nutrient/Hydration intake appropriate for improving, restoring or maintaining nutritional needs  Description: Monitor and assess patient's nutrition/hydration status for malnutrition   Collaborate with interdisciplinary team and initiate plan and interventions as ordered  Monitor patient's weight and dietary intake as ordered or per policy  Utilize nutrition screening tool and intervene as necessary  Determine patient's food preferences and provide high-protein, high-caloric foods as appropriate  INTERVENTIONS:  - Monitor oral intake, urinary output, labs, and treatment plans  - Assess nutrition and hydration status and recommend course of action  - Evaluate amount of meals eaten  - Assist patient with eating if necessary   - Allow adequate time for meals  - Recommend/ encourage appropriate diets, oral nutritional supplements, and vitamin/mineral supplements  - Order, calculate, and assess calorie counts as needed  - Recommend, monitor, and adjust tube feedings and TPN/PPN based on assessed needs  - Assess need for intravenous fluids  - Provide specific nutrition/hydration education as appropriate  - Include patient/family/caregiver in decisions related to nutrition  Outcome: Progressing     Problem: NEUROSENSORY - ADULT  Goal: Achieves maximal functionality and self care  Description: INTERVENTIONS  - Monitor swallowing and airway patency with patient fatigue and changes in neurological status  - Encourage and assist patient to increase activity and self care     - Encourage visually impaired, hearing impaired and aphasic patients to use assistive/communication devices  Outcome: Progressing     Problem: CARDIOVASCULAR - ADULT  Goal: Maintains optimal cardiac output and hemodynamic stability  Description: INTERVENTIONS:  - Monitor I/O, vital signs and rhythm  - Monitor for S/S and trends of decreased cardiac output  - Administer and titrate ordered vasoactive medications to optimize hemodynamic stability  - Assess quality of pulses, skin color and temperature  - Assess for signs of decreased coronary artery perfusion  - Instruct patient to report change in severity of symptoms  Outcome: Progressing     Problem: RESPIRATORY - ADULT  Goal: Achieves optimal ventilation and oxygenation  Description: INTERVENTIONS:  - Assess for changes in respiratory status  - Assess for changes in mentation and behavior  - Position to facilitate oxygenation and minimize respiratory effort  - Oxygen administered by appropriate delivery if ordered  - Initiate smoking cessation education as indicated  - Encourage broncho-pulmonary hygiene including cough, deep breathe, Incentive Spirometry  - Assess the need for suctioning and aspirate as needed  - Assess and instruct to report SOB or any respiratory difficulty  - Respiratory Therapy support as indicated  Outcome: Progressing     Problem: GASTROINTESTINAL - ADULT  Goal: Maintains adequate nutritional intake  Description: INTERVENTIONS:  - Monitor percentage of each meal consumed  - Identify factors contributing to decreased intake, treat as appropriate  - Assist with meals as needed  - Monitor I&O, weight, and lab values if indicated  - Obtain nutrition services referral as needed  Outcome: Progressing     Problem: METABOLIC, FLUID AND ELECTROLYTES - ADULT  Goal: Electrolytes maintained within normal limits  Description: INTERVENTIONS:  - Monitor labs and assess patient for signs and symptoms of electrolyte imbalances  - Administer electrolyte replacement as ordered  - Monitor response to electrolyte replacements, including repeat lab results as appropriate  - Instruct patient on fluid and nutrition as appropriate  Outcome: Progressing  Goal: Fluid balance maintained  Description: INTERVENTIONS:  - Monitor labs   - Monitor I/O and WT  - Instruct patient on fluid and nutrition as appropriate  - Assess for signs & symptoms of volume excess or deficit  Outcome: Progressing     Problem: SKIN/TISSUE INTEGRITY - ADULT  Goal: Skin Integrity remains intact(Skin Breakdown Prevention)  Description: Assess:  -Perform [place] assessment every shift  -Clean and moisturize skin every day  -Inspect skin when repositioning, toileting, and assisting with ADLS  -Assess extremities for adequate circulation and sensation     Bed Management:  -Have minimal linens on bed & keep smooth, unwrinkled  -Change linens as needed when moist or perspiring  -Avoid sitting or lying in one position for more than 2 hours while in bed  -Keep HOB at 45 degrees     Toileting:  -Offer bedside commode  -Assess for incontinence every 2 hours  -Use incontinent care products after each incontinent episode such as brief    Activity:  -Mobilize patient 3-4 times a day  -Encourage activity and walks on unit  -Encourage or provide ROM exercises   -Turn and reposition patient every 2 Hours  -Use appropriate equipment to lift or move patient in bed  -Instruct/ Assist with weight shifting every 2 when out of bed in chair  -Consider limitation of chair time 2 hour intervals    Skin Care:  -Avoid use of baby powder, tape, friction and shearing, hot water or constrictive clothing  -Relieve pressure over bony prominences using pillows  -Do not massage red bony areas    Next Steps:  -Teach patient strategies to minimize risks such as walker  -Consider consults to  interdisciplinary teams such as wound  Outcome: Progressing  Goal: Incision(s), wounds(s) or drain site(s) healing without S/S of infection  Description: INTERVENTIONS  - Assess and document dressing, incision, wound bed, drain sites and surrounding tissue  - Provide patient and family education  - Perform skin care/dressing changes every PRN  Outcome: Progressing     Problem: MUSCULOSKELETAL - ADULT  Goal: Maintain or return mobility to safest level of function  Description: INTERVENTIONS:  - Assess patient's ability to carry out ADLs; assess patient's baseline for ADL function and identify physical deficits which impact ability to perform ADLs (bathing, care of mouth/teeth, toileting, grooming, dressing, etc )  - Assess/evaluate cause of self-care deficits   - Assess range of motion  - Assess patient's mobility  - Assess patient's need for assistive devices and provide as appropriate  - Encourage maximum independence but intervene and supervise when necessary  - Involve family in performance of ADLs  - Assess for home care needs following discharge   - Consider OT consult to assist with ADL evaluation and planning for discharge  - Provide patient education as appropriate  Outcome: Progressing

## 2022-10-12 NOTE — CONSULTS
PHYSICAL MEDICINE AND REHABILITATION CONSULT NOTE  Keaton Rebolledo 45 y o  female MRN: [id number]  Unit/Bed#: [place] 2 -01 Encounter: [id number]    Requested by (Physician/Service): Patrcia Simmonds, MD  Reason for Consultation:  Assessment of rehabilitation needs    Assessment:  Rehabilitation Diagnosis:   • ? Non-traumatic brain injury  • Toxic metabolic encephalopathy   • Impaired mobility and self care  • Impaired cognition     Recommendations:  Rehabilitation Plan:  • Continue PT/OT (SLP) while on acute care  • At this time would recommend dedicated brain injury inpatient rehabilitation  If encephalopathy continues to improve she may become a candidate for acute inpatient rehabilitation  She is pending repeat MRI under general anesthesia at [facility]  • Covid-19 Testing: [facility] inpatient rehabilitation units require testing within 48 hours of all potential admissions at this time  *Re-testing is NOT required for patients recovering from COVID-19 infection if isolation has been discontinued per CDC criteria  Medical Co-morbidities Plan:  · MSSA bacteremia on IV cefazolin   · Acute renal failure resolved   · Rhabdomyolysis resolved   · Hyponatremia   · Lumbar radiculopathy on opioids   · Tobacco abuse  · ETOH abuse  · Hx of cocaine use  · Depression/anxiety  · Hypertension  · Hyperlipidemia   · DVT ppx: heparin and SCD    Thank you for this consultation  Do not hesitate to contact service with further questions  Reginald Wayne  PM&R    History of Present Illness:  Keaton Rebolledo is a 45 y o  female with a PMH of depression, prior suicide attempt, hx of electroconvulsive therapy, anxiety, chronic pain with lumbar radiculopathy, opioid abuse, GERD, IBS, HTN, HLD, cocaine use, ETOH abuse and tobacco use who presented to the [address] [place] [address] on 9/19/22 with acute onset of confusion with fall  Spouse reported a possible fall with head strike as well on 9/16/22   Spouse reported that she was progressively somnolent over the past few day prior to presentation  She was found to have acute renal failure, rhabdo, transaminitis, hyponatremia as well as MSSA bacteremia  CT C/A/P showed bilateral ground glass opacities with superimposed inter and intra lobular septal thickening  UDS was positive for opiods which she is prescribed for back pain and lumbar radiculopathy  She was placed on IV fluids, GI and nephrology were consulted  Her home gabapentin and morphine were held due to ANUJA and encephalopathy  ID was consulted for MSSA bacteremia felt to likely be due to cutaneous vs endovascular source as she was found to have multiple wounds and likely phlebitis  TTE was negative for obvious vegetation and CHERYLE was cancelled due to tenuous respiratory status at that time  She was placed on IV cefazolin for a 4 week course through 10/25/22  She continued to be encephalopathic despite improvements in metabolic derangements and neurology was consulted  MRI was sub-optimal due to motion degraded and patient refused EEG  LP was done which showed an opening pressure of 26, protein of 48, ENC panel was negative  Neurology was asked to reassess given continued encephalopathy  Differentials include metabolic issues, medication effect and possible Wernicke's encephalopathy  It was recommended to draw B1 and B12 and start high dose thiamine after labs are drawn  MRI w/wo was recommended as well and may need to be done under anesthesia  She was recently restarted on her antidepressants  PM&R are consulted for rehabilitation recommendations  The patient was seen in her room  She was in bilateral wrist restraints  She is oriented to person only and mumbling  She is able to follow some simple commands and participate in strength exam  She was able to show two fingers on her right hand when asked  She was not able to do simple math or state correct month/year   She was not able to identify correct place even when given choices  Currently she denies any pain and appears comfortable in bed  Review of Systems: 10 point ROS negative except for what is noted in HPI    Function:  Prior level of function and living situation: The patient lives in a two story home with 4 BARAK  There is one flight to the 2nd floor  She lives with her spouse who is supportive and was independent  Current level of function:  Physical Therapy: Minimal assist for bed mobility, minimal assist x 2 for transfers and ambulation   Occupational Therapy: Supervision for eating, minimal assist for grooming, maximal assist for LB dressing  Physical Exam:  /78   Pulse (!) 111   Temp 97 5 °F (36 4 °C)   Resp 16   Ht 5' 6" (1 676 m)   Wt 51 6 kg (113 lb 12 1 oz)   LMP 03/14/2019 (Approximate)   SpO2 96%   BMI 18 36 kg/m²        Intake/Output Summary (Last 24 hours) at 10/12/2022 1418  Last data filed at 10/11/2022 2237  Gross per 24 hour   Intake 100 ml   Output --   Net 100 ml       Body mass index is 18 36 kg/m²  Physical Exam  Constitutional:       General: She is not in acute distress  Appearance: She is cachectic  HENT:      Head: Normocephalic and atraumatic  Right Ear: External ear normal       Left Ear: External ear normal       Nose: Nose normal       Mouth/Throat:      Mouth: Mucous membranes are moist       Pharynx: Oropharynx is clear  Pulmonary:      Effort: Pulmonary effort is normal    Abdominal:      General: Abdomen is flat  There is no distension  Musculoskeletal:         General: Normal range of motion  Skin:     General: Skin is warm and dry  Neurological:      Mental Status: She is alert  She is disoriented  Comments: Oriented to person only   Psychiatric:         Behavior: Behavior is cooperative  Cognition and Memory: Cognition is impaired  Memory is impaired  Judgment: Judgment is impulsive                Social History:    Social History     Socioeconomic History   • Marital status: /Civil Union     Spouse name: None   • Number of children: None   • Years of education: None   • Highest education level: None   Occupational History   • Occupation: disability   Tobacco Use   • Smoking status: Current Every Day Smoker     Packs/day: 0 50     Years: 35 00     Pack years: 17 50     Types: Cigarettes   • Smokeless tobacco: Never Used   Vaping Use   • Vaping Use: Never used   Substance and Sexual Activity   • Alcohol use: Yes     Comment: "occasional glass of wine"   • Drug use: Not Currently     Types: Marijuana, Cocaine     Comment: medical   • Sexual activity: Yes     Partners: Male     Birth control/protection: Post-menopausal     Comment: PT is    Other Topics Concern   • None   Social History Narrative    Lives with spouse     Social Determinants of Health     Financial Resource Strain: Not on file   Food Insecurity: No Food Insecurity   • Worried About Running Out of Food in the Last Year: Never true   • Ran Out of Food in the Last Year: Never true   Transportation Needs: No Transportation Needs   • Lack of Transportation (Medical): No   • Lack of Transportation (Non-Medical):  No   Physical Activity: Not on file   Stress: Not on file   Social Connections: Not on file   Intimate Partner Violence: Not on file   Housing Stability: Low Risk    • Unable to Pay for Housing in the Last Year: No   • Number of Places Lived in the Last Year: 1   • Unstable Housing in the Last Year: No        Family History:    Family History   Problem Relation Age of Onset   • Arthritis Mother    • Coronary artery disease Mother         MI in her 63's   • Parkinsonism Father         with falls and SDH   • Parkinsonism Paternal Grandmother    • Coronary artery disease Paternal Grandfather    • Parkinsonism Paternal Aunt    • Colon cancer Family    • Depression Neg Hx          Medications:     Current Facility-Administered Medications:   •  acetaminophen (TYLENOL) tablet 650 mg, 650 mg, Oral, Q6H PRN, Atul Adan DO, 650 mg at 09/22/22 1040  •  ALPRAZolam (XANAX) tablet 0 5 mg, 0 5 mg, Oral, HS PRN, Prerica Strauss MD, 0 5 mg at 10/08/22 0105  •  amLODIPine (NORVASC) tablet 10 mg, 10 mg, Oral, Daily, Kandi Devries MD, 10 mg at 10/12/22 2107  •  ceFAZolin (ANCEF) IVPB (premix in dextrose) 2,000 mg 50 mL, 2,000 mg, Intravenous, Q8H, Jen Cruz MD, Last Rate: 100 mL/hr at 10/12/22 1356, 2,000 mg at 10/12/22 1356  •  gabapentin (NEURONTIN) capsule 300 mg, 300 mg, Oral, TID, Ronald Washington MD, 300 mg at 10/12/22 0846  •  heparin (porcine) subcutaneous injection 5,000 Units, 5,000 Units, Subcutaneous, Q8H [facility], Atul Adan DO, 5,000 Units at 10/12/22 1356  •  HYDROmorphone (DILAUDID) injection 0 5 mg, 0 5 mg, Intravenous, Q4H PRN, Fredi Bean DO, 0 5 mg at 10/01/22 0031  •  levalbuterol (XOPENEX) inhalation solution 1 25 mg, 1 25 mg, Nebulization, Q4H PRN, Nika Delatorre DO  •  loperamide (IMODIUM) oral liquid 2 mg, 2 mg, Oral, 4x Daily PRN, Prerica Strauss MD, 2 mg at 10/09/22 2336  •  metoprolol tartrate (LOPRESSOR) tablet 25 mg, 25 mg, Oral, Q12H [facility], Alexsandra Strauss MD, 25 mg at 10/12/22 0845  •  moisture barrier miconazole 2% cream (aka VANDANA MOISTURE BARRIER ANTIFUNGAL CREAM), , Topical, BID, Sam Vann, , Given at 10/12/22 0846  •  nicotine (NICODERM CQ) 14 mg/24hr TD 24 hr patch 1 patch, 1 patch, Transdermal, Daily, Atul Adan DO, 1 patch at 10/12/22 0846  •  OLANZapine (ZyPREXA ZYDIS) dispersible tablet 5 mg, 5 mg, Oral, Q6H PRN, Nika Delatorre DO, 5 mg at 10/09/22 2133  •  ondansetron (ZOFRAN) injection 4 mg, 4 mg, Intravenous, Q4H PRN, Fredi Bean DO  •  QUEtiapine (SEROquel) tablet 50 mg, 50 mg, Oral, BID, Prudence MD Rica, 50 mg at 10/12/22 0845  •  saccharomyces boulardii (FLORASTOR) capsule 250 mg, 250 mg, Oral, BID, Prudence MD Rica, 250 mg at 10/12/22 0845  •  thiamine (VITAMIN B1) 100 mg in sodium chloride 0 9 % 50 mL IVPB, 100 mg, Intravenous, Q24H, Patrcia Simmonds, MD, Last Rate: 100 mL/hr at 10/11/22 2142, 100 mg at 10/11/22 2142  •  venlafaxine (EFFEXOR-XR) 24 hr capsule 150 mg, 150 mg, Oral, Daily, Patrcia Simmonds, MD, 150 mg at 10/12/22 0845    Past Medical History:     Past Medical History:   Diagnosis Date   • Chronic pain disorder    • Depression    • GERD (gastroesophageal reflux disease)    • History of electroconvulsive therapy    • Low back pain    • Self-injurious behavior    • Suicide attempt [facility])         Past Surgical History:     Past Surgical History:   Procedure Laterality Date   •  SECTION     • COLONOSCOPY     • PANCREAS SURGERY      "pseudocysts" per patient's  Ricki Infante   • IN ESOPHAGOGASTRODUODENOSCOPY TRANSORAL DIAGNOSTIC N/A 4/10/2018    Procedure: EGD AND COLONOSCOPY;  Surgeon: Nicol Morris MD;  [place] [facility]; Service: Gastroenterology         Allergies: Allergies   Allergen Reactions   • Chantix [Varenicline]    • Ibuprofen Other (See Comments)     Upset stomach   • Lyrica [Pregabalin] Other (See Comments)     bruising   • Penicillins Other (See Comments)     ? hives   • Sulfa Antibiotics Other (See Comments)     sloughing skin in mouth   • Sulfasalazine            LABORATORY RESULTS:      Lab Results   Component Value Date    HGB 11 6 10/10/2022    HGB 16 7 (H) 2014    HCT 37 0 10/10/2022    WBC 12 85 (H) 10/10/2022     Lab Results   Component Value Date    BUN 20 10/10/2022    K 4 0 10/10/2022     10/10/2022    GLUCOSE 76 2017    CREATININE 1 37 (H) 10/10/2022     Lab Results   Component Value Date    PROTIME 13 5 2022    INR 1 03 2022        DIAGNOSTIC STUDIES: Reviewed  CT chest abdomen pelvis wo contrast    Result Date: 2022  Impression: Bilateral groundglass opacities with superimposed inter and intralobular septal thickening    Differential diagnosis includes pulmonary hemorrhage, infection, and pulmonary edema, although the distribution is atypical for edema  The study was marked in [facility] for immediate notification  Workstation performed: [id number]     XR hip/pelv 2-3 vws right    Result Date: 9/19/2022  Impression: No acute osseous abnormality  Workstation performed: [id number]     XR ankle 3+ views RIGHT    Result Date: 9/19/2022  Impression: No acute osseous abnormality  Workstation performed: [id number]     XR foot 3+ views RIGHT    Result Date: 9/19/2022  Impression: No acute osseous abnormality  Workstation performed: [id number]     CT head without contrast    Result Date: 9/19/2022  Impression: No acute intracranial process  No skull fracture  Workstation performed: [id number]     CT spine cervical without contrast    Result Date: 9/19/2022  Impression: No cervical spine fracture or traumatic malalignment  Incidental finding of partially visualized subpleural groundglass opacity in the left pulmonary apex presumably scarring  Cannot exclude infiltrate  This study demonstrates a significant  finding and was documented as such in [facility] for liaison and referring practitioner notification  Workstation performed: [id number]     MRI brain wo contrast    Result Date: 9/23/2022  Impression: No evidence of recent infarct  No mass effect or midline shift  Study performed in the limited fashion with extensive motion artifact  Workstation performed: [id number]     XR chest 1 view    Result Date: 9/19/2022  Impression: No acute cardiopulmonary disease  Findings are stable Workstation performed: [id number]     US right upper quadrant with liver dopplers    Result Date: 9/20/2022  Impression: 1  Minimal layering sludge without evidence of acute cholecystitis  2   Normal-appearing liver with normal liver Dopplers  Workstation performed: [id number]     CT lower extremity wo contrast right    Result Date: 9/19/2022  Impression: There is no evidence of intramuscular hematoma or other mass lesion in the right calf    Please note that rhabdomyolysis and compartment syndrome are clinical diagnoses, and are not excluded based on these imaging findings   Workstation performed: [id number]

## 2022-10-12 NOTE — UTILIZATION REVIEW
Continued Stay Review    Date: 10/12/22                          Current Patient Class: IP  Current Level of Care: MS    HPI:52 y o  female initially admitted on 9/19 altered mental status - extensive medical comorbidity including chronic pain disorder, opiate abuse, significant mental health issue, hypertension, prior alcohol abuse/cocaine use  Assessment/Plan:   Pt has no structural neuro condition  She has toxic/metabolic encephalopathy  Neuro recommending labs for B1, B 12 for further investigation  Possible Wernicke's encephalopathy, will need repeat MRI Brain - pending  Recommend high dose Thiamine infusion post blood collection for B1 level  Today intermittently tachycardic, on room air, afebrile, BP stable  Is on IV Thiamine daily and remains on IV Cefazolin for MSSA bacteremia  Resumed Venlafaxine to see if it makes an improvement on mental status  She is confused/somnolent, arousable today on exam   No N/V/D  Lungs clear        Vital Signs:   10/12/22 08:45:33 -- 111 Abnormal  -- 116/78 91 96 % -- --   10/12/22 07:57:39 97 5 °F (36 4 °C) 118 Abnormal  16 103/75 84 97 % -- --   10/11/22 22:56:59 -- 104 18 132/88 103 98 % -- --   10/11/22 21:42:28 -- 114 Abnormal  -- 135/87 103 98 % -- --   10/11/22 19:09:30 97 8 °F (36 6 °C) 109 Abnormal  -- 104/67 79 99 % -- --   10/11/22 1300 -- -- -- -- -- -- None (Room air) --   10/11/22 1200 -- -- -- -- -- 99 % None (Room air) --   10/11/22 07:24:57 98 °F (36 7 °C) 101 20 140/89 106 98 % None (Room air) --   10/10/22 21:16:20 96 7 °F (35 9 °C) Abnormal  105 18 137/89 105 99 % None (Room air) Lying   10/10/22 15:02:16 -- 112 Abnormal  18 122/85 97 100 % -- --   10/10/22 0900 -- 107 Abnormal  -- 123/93 -- -- -- --   10/10/22 08:28:10 97 5 °F (36 4 °C) 106 Abnormal  17 123/93 103 98 % -- --     Pertinent Labs/Diagnostic Results:     10/11 MRI Brain - P     10/10 Echo - •  Left Ventricle: Left ventricular cavity size is normal  Wall thickness is normal  The left ventricular ejection fraction is 62%   Systolic function is normal  Wall motion is normal  Diastolic function is mildly abnormal, consistent with grade I (abnormal) relaxation            Results from last 7 days   Lab Units 10/10/22  0455 10/09/22  0432 10/08/22  0441 10/07/22  0834 10/06/22  0622   WBC Thousand/uL 12 85* 14 62* 12 39* 13 61* 14 36*   HEMOGLOBIN g/dL 11 6 11 1* 12 0 11 0* 10 4*   HEMATOCRIT % 37 0 35 0 37 8 34 0* 33 4*   PLATELETS Thousands/uL 407* 368 361 299 318   NEUTROS ABS Thousands/µL 9 01* 10 44* 8 20* 10 16* 10 03*         Results from last 7 days   Lab Units 10/10/22  0455 10/09/22  0432 10/08/22  0441 10/07/22  0834 10/06/22  0622   SODIUM mmol/L 140 134* 140 139 140   POTASSIUM mmol/L 4 0 3 5 3 7 4 3 3 9   CHLORIDE mmol/L 104 97 101 104 104   CO2 mmol/L 27 28 31 28 29   ANION GAP mmol/L 9 9 8 7 7   BUN mg/dL 20 22 27* 24 24   CREATININE mg/dL 1 37* 1 68* 1 95* 1 83* 2 03*   EGFR ml/min/1 73sq m 43 34 28 30 27   CALCIUM mg/dL 11 1* 11 2* 11 3* 10 2* 10 9*             Results from last 7 days   Lab Units 10/10/22  0455 10/09/22  0432 10/08/22  0441 10/07/22  0834 10/06/22  0622   GLUCOSE RANDOM mg/dL 104 98 105 114 153*       Medications:   Scheduled Medications:  amLODIPine, 10 mg, Oral, Daily  cefazolin, 2,000 mg, Intravenous, Q8H  gabapentin, 300 mg, Oral, TID  heparin (porcine), 5,000 Units, Subcutaneous, Q8H [facility]  metoprolol tartrate, 25 mg, Oral, Q12H [facility]  VANDANA ANTIFUNGAL, , Topical, BID  nicotine, 1 patch, Transdermal, Daily  QUEtiapine, 50 mg, Oral, BID  saccharomyces boulardii, 250 mg, Oral, BID  thiamine, 100 mg, Intravenous, Q24H  venlafaxine, 150 mg, Oral, Daily      Continuous IV Infusions:     PRN Meds:  acetaminophen, 650 mg, Oral, Q6H PRN  ALPRAZolam, 0 5 mg, Oral, HS PRN  HYDROmorphone, 0 5 mg, Intravenous, Q4H PRN  levalbuterol, 1 25 mg, Nebulization, Q4H PRN  loperamide, 2 mg, Oral, 4x Daily PRN  OLANZapine, 5 mg, Oral, Q6H PRN  ondansetron, 4 mg, Intravenous, Q4H PRN    Discharge Plan: rehab    Network Utilization Review Department  ATTENTION: Please call with any questions or concerns to [phone] and carefully listen to the prompts so that you are directed to the right person  All voicemails are confidential   Jarvis Hui all requests for admission clinical reviews, approved or denied determinations and any other requests to dedicated fax number below belonging to the campus where the patient is receiving treatment   List of dedicated fax numbers for the Facilities:  [address] DENIALS (Administrative/Medical Necessity) [phone]   [address] (Maternity/NICU/Pediatrics) [phone]   [address] Minda Baldwin [phone]   [facility] [phone]   [address] [facility] [address] [facility] [place] [address] [address] Wanda Miller 28 [phone]   [address] [facility] 134 815 [facility] [phone]

## 2022-10-12 NOTE — PROGRESS NOTES
Progress Note - Infectious Disease   Jordna Case 45 y o  female MRN: [id number]  Unit/Bed#: [place] [address] -01 Encounter: [id number]    Impression/Plan:   1  SIRS vs Sepsis  Evolving since admission  Fever, tachycardia, and mild leukocytosis  Suspect this is likely due to MSSA bacteremia  No other clear source appreciated  COVID-19 negative   UA benign   Status post lumbar puncture for altered mental status with negative ME panel   Patient has improved with no recurrent high fever spike  No lactic acidosis  This morning she is afebrile  Her WBC count has improved  Repeat blood cultures were negative >5 days  Mental status remains significantly altered   -antibiotics as below   -monitor CBCD and BMP  -monitor vitals  -supportive care      2  MSSA bacteremia  Patient was found down for prolonged period of time with evidence of multiple wounds on admission  Suspect cutaneous vs endovascular source, likely phlebitis as there is report of RUE erythema surrounding prior IV in addition to palpable cord in left antecub  TTE negative for obvious vegetation  CHERYLE was planned but then cancelled due to tenuous respiratory status  Repeat TTE still without good visualization of aortic valve  The patient is currently receiving IV cefazolin which she is tolerating without difficulty  Anticipate 4 weeks of IV antibiotic   -continue IV cefazolin through 10/25/2022 for 4 weeks of antibiotic treatment after cleared blood cultures  -weekly CBCD and creatinine while on IV antibiotic  -monitor vitals  -patient will require outpatient ID follow up after discharge, I will coordinate as needed and place information in discharge planning     3  Toxic Metabolic encephalopathy   Likely multifactorial etiology with uremia playing a role   This was initially thought to be secondary to gabapentin and morphine use in the setting of renal failure, hypotension, rhabdomyolysis   MRI brain negative   Lumbar puncture with opening pressure 26   Negative ME panel  CSF fluid culture not collected   The most likely to be secondary to metabolic issues, medication effect, and possible Wernicke's encephalopathy  Psychiatric issues also likely playing a role  Mental status remains significantly altered  Neurology has reassessed patient  She will receive thiamine, no additional recommendations at this time    -serial neuro exams  -monitor cognition, mood, and mental status  -treat metabolic issues and psychiatric issues  -supportive care  -continue follow up with behavioral health  -continue follow up with neurology   -no additional ID workup needed     4  Acute renal failure   Creatinine on admission 5 88 with CK over 32,000  Creatinine reached plateau, now down trending   Likely multifactorial etiology in the setting of rhabdomyolysis and dehydration  Likely ANUJA/ATN    Most recent creatinine was improved to 1 37   -monitor creatinine  -dose adjust antibiotics for renal function as needed  -avoid nephrotoxins  -continue follow up with nephrology     5  Tobacco abuse   Encourage cessation as recommended by primary team   -NRT per primary service     6  Antibiotic allergy  Reports hives with penicillin  Patient has been tolerating cephalosporin without difficulty  -monitor patient for adverse medication reactions     Above plan was discussed in detail with patient at the bedside  Above plan was discussed in detail with SLIM  Antibiotics:  Cefazolin 15  Antibiotics 15    Subjective:  Unable to perform ROS as patient remains encephalopathic today  She was moaning but did not offer any meaningful communication  Objective:  Vitals:  Temp:  [97 8 °F (36 6 °C)] 97 8 °F (36 6 °C)  HR:  [104-114] 104  Resp:  [18] 18  BP: (104-135)/(67-88) 132/88  SpO2:  [98 %-99 %] 98 %  Temp (24hrs), Av 8 °F (36 6 °C), Min:97 8 °F (36 6 °C), Max:97 8 °F (36 6 °C)  Current: Temperature: 97 8 °F (36 6 °C)    Physical Exam:   General Appearance:  Confused, restless   Supine in bed in no acute distress  Still in restraints  Throat: Oropharynx moist without lesions  Lungs:   Clear to auscultation bilaterally; no wheezes, rhonchi or rales; respirations unlabored on room air  Heart:  Tachycardic; no murmur, rub or gallop  Abdomen:   Soft, no facial grimace with abdominal palpation, non-distended, positive bowel sounds  Extremities: No clubbing or cyanosis, no edema  Skin: No new rashes, lesions, or draining wounds noted on exposed skin       Labs, Imaging, & Other studies:   All pertinent labs and imaging studies were personally reviewed  Results from last 7 days   Lab Units 10/10/22  0455 10/09/22  0432 10/08/22  0441   WBC Thousand/uL 12 85* 14 62* 12 39*   HEMOGLOBIN g/dL 11 6 11 1* 12 0   PLATELETS Thousands/uL 407* 368 361     Results from last 7 days   Lab Units 10/10/22  0455   POTASSIUM mmol/L 4 0   CHLORIDE mmol/L 104   CO2 mmol/L 27   BUN mg/dL 20   CREATININE mg/dL 1 37*   EGFR ml/min/1 73sq m 43   CALCIUM mg/dL 11 1*

## 2022-10-13 ENCOUNTER — ANESTHESIA (INPATIENT)
Dept: RADIOLOGY | Facility: HOSPITAL | Age: 52
End: 2022-10-13
Payer: COMMERCIAL

## 2022-10-13 ENCOUNTER — ANESTHESIA EVENT (INPATIENT)
Dept: RADIOLOGY | Facility: HOSPITAL | Age: 52
End: 2022-10-13
Payer: COMMERCIAL

## 2022-10-13 LAB
ALBUMIN SERPL BCP-MCNC: 2.5 G/DL (ref 3.5–5)
ALP SERPL-CCNC: 93 U/L (ref 46–116)
ALT SERPL W P-5'-P-CCNC: 9 U/L (ref 12–78)
ANION GAP SERPL CALCULATED.3IONS-SCNC: 9 MMOL/L (ref 4–13)
AST SERPL W P-5'-P-CCNC: 24 U/L (ref 5–45)
BASOPHILS # BLD AUTO: 0.09 THOUSANDS/ÂΜL (ref 0–0.1)
BASOPHILS NFR BLD AUTO: 1 % (ref 0–1)
BILIRUB SERPL-MCNC: 0.24 MG/DL (ref 0.2–1)
BUN SERPL-MCNC: 18 MG/DL (ref 5–25)
CALCIUM ALBUM COR SERPL-MCNC: 11.7 MG/DL (ref 8.3–10.1)
CALCIUM SERPL-MCNC: 10.5 MG/DL (ref 8.3–10.1)
CHLORIDE SERPL-SCNC: 103 MMOL/L (ref 96–108)
CO2 SERPL-SCNC: 26 MMOL/L (ref 21–32)
CREAT SERPL-MCNC: 1.18 MG/DL (ref 0.6–1.3)
EOSINOPHIL # BLD AUTO: 0.14 THOUSAND/ÂΜL (ref 0–0.61)
EOSINOPHIL NFR BLD AUTO: 1 % (ref 0–6)
ERYTHROCYTE [DISTWIDTH] IN BLOOD BY AUTOMATED COUNT: 13.6 % (ref 11.6–15.1)
GFR SERPL CREATININE-BSD FRML MDRD: 52 ML/MIN/1.73SQ M
GLUCOSE SERPL-MCNC: 109 MG/DL (ref 65–140)
HCT VFR BLD AUTO: 30.8 % (ref 34.8–46.1)
HGB BLD-MCNC: 9.8 G/DL (ref 11.5–15.4)
IMM GRANULOCYTES # BLD AUTO: 0.04 THOUSAND/UL (ref 0–0.2)
IMM GRANULOCYTES NFR BLD AUTO: 0 % (ref 0–2)
LYMPHOCYTES # BLD AUTO: 2.03 THOUSANDS/ÂΜL (ref 0.6–4.47)
LYMPHOCYTES NFR BLD AUTO: 20 % (ref 14–44)
MCH RBC QN AUTO: 32.1 PG (ref 26.8–34.3)
MCHC RBC AUTO-ENTMCNC: 31.8 G/DL (ref 31.4–37.4)
MCV RBC AUTO: 101 FL (ref 82–98)
MONOCYTES # BLD AUTO: 0.47 THOUSAND/ÂΜL (ref 0.17–1.22)
MONOCYTES NFR BLD AUTO: 5 % (ref 4–12)
NEUTROPHILS # BLD AUTO: 7.22 THOUSANDS/ÂΜL (ref 1.85–7.62)
NEUTS SEG NFR BLD AUTO: 73 % (ref 43–75)
NRBC BLD AUTO-RTO: 0 /100 WBCS
PHOSPHATE SERPL-MCNC: 4.8 MG/DL (ref 2.7–4.5)
PLATELET # BLD AUTO: 266 THOUSANDS/UL (ref 149–390)
PMV BLD AUTO: 12.7 FL (ref 8.9–12.7)
POTASSIUM SERPL-SCNC: 4.1 MMOL/L (ref 3.5–5.3)
PROT SERPL-MCNC: 6.6 G/DL (ref 6.4–8.4)
PTH-INTACT SERPL-MCNC: 12.1 PG/ML (ref 18.4–80.1)
RBC # BLD AUTO: 3.05 MILLION/UL (ref 3.81–5.12)
SODIUM SERPL-SCNC: 138 MMOL/L (ref 135–147)
WBC # BLD AUTO: 9.99 THOUSAND/UL (ref 4.31–10.16)

## 2022-10-13 RX ORDER — LORAZEPAM 2 MG/ML
1 INJECTION INTRAMUSCULAR ONCE
Status: COMPLETED | OUTPATIENT
Start: 2022-10-13 | End: 2022-10-13

## 2022-10-13 RX ADMIN — MICONAZOLE NITRATE: 20 CREAM TOPICAL at 17:43

## 2022-10-13 RX ADMIN — LORAZEPAM 1 MG: 2 INJECTION INTRAMUSCULAR; INTRAVENOUS at 14:44

## 2022-10-13 RX ADMIN — GABAPENTIN 300 MG: 300 CAPSULE ORAL at 17:42

## 2022-10-13 RX ADMIN — QUETIAPINE FUMARATE 50 MG: 25 TABLET ORAL at 09:47

## 2022-10-13 RX ADMIN — HEPARIN SODIUM 5000 UNITS: 5000 INJECTION INTRAVENOUS; SUBCUTANEOUS at 05:40

## 2022-10-13 RX ADMIN — AMLODIPINE BESYLATE 10 MG: 10 TABLET ORAL at 09:48

## 2022-10-13 RX ADMIN — Medication 250 MG: at 09:47

## 2022-10-13 RX ADMIN — THIAMINE HYDROCHLORIDE 100 MG: 100 INJECTION, SOLUTION INTRAMUSCULAR; INTRAVENOUS at 22:06

## 2022-10-13 RX ADMIN — CEFAZOLIN SODIUM 2000 MG: 2 SOLUTION INTRAVENOUS at 23:21

## 2022-10-13 RX ADMIN — CEFAZOLIN SODIUM 2000 MG: 2 SOLUTION INTRAVENOUS at 14:03

## 2022-10-13 RX ADMIN — GABAPENTIN 300 MG: 300 CAPSULE ORAL at 22:06

## 2022-10-13 RX ADMIN — METOPROLOL TARTRATE 25 MG: 25 TABLET, FILM COATED ORAL at 22:06

## 2022-10-13 RX ADMIN — Medication 250 MG: at 17:42

## 2022-10-13 RX ADMIN — METOPROLOL TARTRATE 25 MG: 25 TABLET, FILM COATED ORAL at 09:48

## 2022-10-13 RX ADMIN — MICONAZOLE NITRATE 1 APPLICATION: 20 CREAM TOPICAL at 09:48

## 2022-10-13 RX ADMIN — CEFAZOLIN SODIUM 2000 MG: 2 SOLUTION INTRAVENOUS at 05:40

## 2022-10-13 RX ADMIN — VENLAFAXINE HYDROCHLORIDE 150 MG: 150 CAPSULE, EXTENDED RELEASE ORAL at 09:48

## 2022-10-13 RX ADMIN — HEPARIN SODIUM 5000 UNITS: 5000 INJECTION INTRAVENOUS; SUBCUTANEOUS at 22:06

## 2022-10-13 RX ADMIN — ACETAMINOPHEN 650 MG: 325 TABLET, FILM COATED ORAL at 22:06

## 2022-10-13 RX ADMIN — QUETIAPINE FUMARATE 50 MG: 25 TABLET ORAL at 17:42

## 2022-10-13 RX ADMIN — GABAPENTIN 300 MG: 300 CAPSULE ORAL at 09:47

## 2022-10-13 RX ADMIN — HEPARIN SODIUM 5000 UNITS: 5000 INJECTION INTRAVENOUS; SUBCUTANEOUS at 14:03

## 2022-10-13 NOTE — PROGRESS NOTES
Progress Note - Rommel Figueredo 45 y o  female MRN: [id number]    Unit/Bed#: [place] [address] -01 Encounter: [id number]      Subjective: The patient is more alert today  She said that she slept okay  She said that she ate some breakfast   She denies any chest pain, shortness a breath, abdominal pain, nausea, or vomiting  She has still been intermittently agitated  Physical Exam:   Temp:  [97 9 °F (36 6 °C)-98 8 °F (37 1 °C)] 97 9 °F (36 6 °C)  HR:  [] 99  Resp:  [18] 18  BP: (101-126)/(68-83) 117/71    Gen:  Well-developed, frail, in no distress  Neck:  Supple  No lymphadenopathy, goiter, or bruit  Heart:  Regular rhythm  No murmur, gallop, or rub  Lungs:  Clear to auscultation percussion  No wheezing, rales, or rhonchi  Abd:  Soft with active bowel sounds  No mass, tenderness, or organomegaly  Extremities:  No clubbing, cyanosis, or edema  No calf tenderness  Neuro:  Alert and conversant  Still confused  Moves all limbs  Skin:  Warm and dry      LABS:   CBC:   Lab Results   Component Value Date    WBC 9 99 10/13/2022    HGB 9 8 (L) 10/13/2022    HCT 30 8 (L) 10/13/2022     (H) 10/13/2022     10/13/2022    MCH 32 1 10/13/2022    MCHC 31 8 10/13/2022    RDW 13 6 10/13/2022    MPV 12 7 10/13/2022    NRBC 0 10/13/2022   , CMP:   Lab Results   Component Value Date    SODIUM 138 10/13/2022    K 4 1 10/13/2022     10/13/2022    CO2 26 10/13/2022    BUN 18 10/13/2022    CREATININE 1 18 10/13/2022    CALCIUM 10 5 (H) 10/13/2022    AST 24 10/13/2022    ALT 9 (L) 10/13/2022    ALKPHOS 93 10/13/2022    EGFR 52 10/13/2022             Assessment/Plan:  1  Encephalopathy secondary to multiple metabolic derangements at the time of admission  2  MSSA bacteremia  3  History of depression  4  Lumbar radiculopathy  5  Rhabdomyolysis, resolved  6  Acute renal failure on admission, resolved  7  Mild hypercalcemia possibly related to immobility  8  Chronic narcotic therapy  9   Tobacco abuse    Although the patient is intermittently agitated, overall her mental status has improved over the past few days  PMR input is greatly appreciated  The patient remains on antibiotics for MSSA bacteremia  MRI under anesthesia has been scheduled  Initially, was going to be this afternoon but because of logistical issues, it has been postponed until tomorrow  We will continue with current supportive care        VTE Pharmacologic Prophylaxis: Heparin  VTE Mechanical Prophylaxis: sequential compression device

## 2022-10-13 NOTE — CASE MANAGEMENT
Case Management Discharge Planning Note    Patient name Eliz Babin  Location [facility] Charly Puente* MRN [id number]  : 1970 Date 10/13/2022       Current Admission Date: 2022  Current Admission Diagnosis:Toxic metabolic encephalopathy   Patient Active Problem List    Diagnosis Date Noted   • Mild protein-calorie malnutrition ([address] ) 10/09/2022   • Diarrhea 10/03/2022   • Hypokalemia 10/02/2022   • Hypernatremia 10/02/2022   • Aspiration pneumonitis ([address] ) 10/01/2022   • Pulmonary edema 2022   • Bacteremia due to methicillin susceptible Staphylococcus aureus (MSSA) 2022   • Localized swelling of right upper extremity 2022   • Leg edema, right 2022   • D-dimer, elevated 2022   • ARF (acute renal failure) ([facility] ) 2022   • Transaminitis 2022   • Toxic metabolic encephalopathy    • Abnormal CT of the chest 2022   • Traumatic rhabdomyolysis ([facility] ) 2022   • Hyperglycemia 2022   • Opioid dependence ([facility] ) 2021   • Chronic right shoulder pain 2021   • Internal hemorrhoids 2020   • Adnexal cyst 2020   • Ischemic colitis ([address] ) 2020   • Intercostal neuralgia    • Hematochezia 2019   • Insomnia 2019   • History of rib fracture 10/28/2018   • Tobacco abuse 10/28/2018   • Right knee pain 2018   • Generalized anxiety disorder 2018   • Hypertension    • Hyperlipidemia    • Depression    • COPD (chronic obstructive pulmonary disease) ([facility] )    • Chondromalacia patellae 2018   • Irritable bowel syndrome with diarrhea 2018   • DDD (degenerative disc disease), lumbosacral 2014      LOS (days): 24  Geometric Mean LOS (GMLOS) (days):   Days to GMLOS:     OBJECTIVE:  Risk of Unplanned Readmission Score: 27 79         Current admission status: Inpatient   Preferred Pharmacy:   [address] [place] [address] 86   [address] [facility] [address]  Phone: [phone] Fax: [address] [facility] [address] [facility] [place]  Phone: [phone] Fax: [phone]    Primary Care Provider: Erica Vyas MD    Primary Insurance: BLUE CROSS  Secondary Insurance:     DISCHARGE DETAILS:                           Contacts  Patient Contacts: Ricki Infante  Relationship to Patient[de-identified] Family  Contact Method: In Person  Reason/Outcome: Discharge Planning, Continuity of Care, Referral                                                                     Additional Comments: Family meeting held Wednesday with attending, CM, pt, pt's  with daughter, and parents in room  Attending reviewed current medical state and is for MRI this day  PMR ordered as pt's condition has improved/stabilized so to assess recommendations for rehab LOC  SNF's following continue to be updated with no final choice to date due to none offering a bed at this time  Pt continues to require restraints which are to be weaned off as able as will need to be off for 24hrs prior to rehab   agreeable to the above plan with ongoing dc planning pending PMR results and accepting facilities  will continue to follow to assist with dc poc

## 2022-10-13 NOTE — CASE MANAGEMENT
Case Management Discharge Planning Note    Patient name Isra Perez  [place] [address]/[facility] 2 Petra Phillips* MRN [id number]  : 1970 Date 10/13/2022       Current Admission Date: 2022  Current Admission Diagnosis:Toxic metabolic encephalopathy   Patient Active Problem List    Diagnosis Date Noted   • Mild protein-calorie malnutrition ([address] ) 10/09/2022   • Diarrhea 10/03/2022   • Hypokalemia 10/02/2022   • Hypernatremia 10/02/2022   • Aspiration pneumonitis ([address] ) 10/01/2022   • Pulmonary edema 2022   • Bacteremia due to methicillin susceptible Staphylococcus aureus (MSSA) 2022   • Localized swelling of right upper extremity 2022   • Leg edema, right 2022   • D-dimer, elevated 2022   • ARF (acute renal failure) ([address] ) 2022   • Transaminitis 2022   • Toxic metabolic encephalopathy    • Abnormal CT of the chest 2022   • Traumatic rhabdomyolysis ([facility] ) 2022   • Hyperglycemia 2022   • Opioid dependence ([facility] ) 2021   • Chronic right shoulder pain 2021   • Internal hemorrhoids 2020   • Adnexal cyst 2020   • Ischemic colitis ([address] ) 2020   • Intercostal neuralgia    • Hematochezia 2019   • Insomnia 2019   • History of rib fracture 10/28/2018   • Tobacco abuse 10/28/2018   • Right knee pain 2018   • Generalized anxiety disorder 2018   • Hypertension    • Hyperlipidemia    • Depression    • COPD (chronic obstructive pulmonary disease) ([facility] )    • Chondromalacia patellae 2018   • Irritable bowel syndrome with diarrhea 2018   • DDD (degenerative disc disease), lumbosacral 2014      LOS (days): 24  Geometric Mean LOS (GMLOS) (days):   Days to GMLOS:     OBJECTIVE:  Risk of Unplanned Readmission Score: 30 08         Current admission status: Inpatient   Preferred Pharmacy:   [address] [place] [address]   [address] [facility] [address]  Phone: [phone] Fax: 323 [facility] [address], [address]  [place] [address]  Phone: [phone] Fax: [phone]    Primary Care Provider: Meron Marroquin MD    Primary Insurance: BLUE CROSS  Secondary Insurance:     DISCHARGE DETAILS:                           Contacts  Patient Contacts: Phan Smith  Relationship to Patient[de-identified] Family  Contact Method: Phone  Phone Number: [phone]  Reason/Outcome: Other (Comment)                                                                     Additional Comments: Pt's  informed of MRI being postponed until Friday with a 1245  from [facility] with a 1330 MRI time  Pt to be picked up at 1500 to return to [facility] of same and appreciative of update  Unit Manager aware of need for RN to accompany pt to test and attending made aware of postponement

## 2022-10-13 NOTE — PROGRESS NOTES
Progress Note - Infectious Disease   Vidhya Pettit 45 y o  female MRN: [id number]  Unit/Bed#: [place] [address] -01 Encounter: [id number]    Impression/Plan:  1  SIRS vs Sepsis  Evolving since admission  Fever, tachycardia, and mild leukocytosis  Suspect this is likely due to MSSA bacteremia  No other clear source appreciated  COVID-19 negative   UA benign   Status post lumbar puncture for altered mental status with negative ME panel   Patient has improved with no recurrent high fever spike  No lactic acidosis  This morning she is afebrile  Her WBC count has improved  Repeat blood cultures were negative >5 days  Mental status remains significantly altered although she is unrestrained today   -antibiotics as below   -monitor CBCD and BMP  -monitor vitals  -supportive care      2  MSSA bacteremia  Patient was found down for prolonged period of time with evidence of multiple wounds on admission  Suspect cutaneous vs endovascular source, likely phlebitis as there is report of RUE erythema surrounding prior IV in addition to palpable cord in left antecub  TTE negative for obvious vegetation  CHERYLE was planned but then cancelled due to tenuous respiratory status  Repeat TTE still without good visualization of aortic valve  The patient is currently receiving IV cefazolin which she is tolerating without difficulty   Anticipate 4 weeks of IV antibiotic   -continue IV cefazolin through 10/25/2022 for 4 weeks of antibiotic treatment after cleared blood cultures  -weekly CBCD and creatinine while on IV antibiotic  -monitor vitals  -patient will require outpatient ID follow up after discharge, I will coordinate as needed and place information in discharge planning     3  Toxic Metabolic encephalopathy   Likely multifactorial etiology with uremia playing a role   This was initially thought to be secondary to gabapentin and morphine use in the setting of renal failure, hypotension, rhabdomyolysis   MRI brain negative   Lumbar puncture with opening pressure 26  Negative ME panel  CSF fluid culture not collected   The most likely to be secondary to metabolic issues, medication effect, and possible Wernicke's encephalopathy  Psychiatric issues also likely playing a role  Mental status remains significantly altered  Neurology has reassessed patient  She will receive thiamine, no additional recommendations at this time    -serial neuro exams  -monitor cognition, mood, and mental status  -treat metabolic issues and psychiatric issues  -supportive care  -continue follow up with behavioral health  -continue follow up with neurology   -no additional ID workup needed     4  Acute renal failure   Creatinine on admission 5 88 with CK over 32,000  Creatinine reached plateau, now down trending   Likely multifactorial etiology in the setting of rhabdomyolysis and dehydration  Likely ANUJA/ATN    Most recent creatinine was improved to 1  18   -monitor creatinine  -dose adjust antibiotics for renal function as needed  -avoid nephrotoxins  -continue follow up with nephrology     5  Tobacco abuse   Encourage cessation as recommended by primary team   -NRT per primary service     6  Antibiotic allergy  Reports hives with penicillin  Patient has been tolerating cephalosporin without difficulty  -monitor patient for adverse medication reactions     Above plan was discussed in detail with patient at the bedside  Above plan was discussed in detail with SLIM  Antibiotics:  Cefazolin 16  Antibiotics 16    Subjective:  Unable to perform ROS as patient's mentation remains significantly altered  She is unrestrained today but seems to be staying in bed      Objective:  Vitals:  Temp:  [97 1 °F (36 2 °C)-97 5 °F (36 4 °C)] 97 1 °F (36 2 °C)  HR:  [] 99  Resp:  [16] 16  BP: (101-116)/(68-83) 106/83  SpO2:  [92 %-99 %] 92 %  Temp (24hrs), Av 2 °F (36 2 °C), Min:97 1 °F (36 2 °C), Max:97 5 °F (36 4 °C)  Current: Temperature: (!) 97 1 °F (36 2 °C)    Physical Exam: General Appearance:  Alert, somewhat interactive but confused and speech does not make sense yet  She appears restless, sitting up, in no acute distress  Throat: Oropharynx moist without lesions  Lungs:   Clear to auscultation bilaterally; no wheezes, rhonchi or rales; respirations unlabored on room air  Heart:  Tachycardic; no murmur, rub or gallop  Abdomen:   Soft, no facial grimace with abdominal palpation, non-distended, positive bowel sounds  Extremities: No clubbing or cyanosis, no edema  Skin: No new rashes noted on exposed skin       Labs, Imaging, & Other studies:   All pertinent labs and imaging studies were personally reviewed  Results from last 7 days   Lab Units 10/10/22  0455 10/09/22  0432 10/08/22  0441   WBC Thousand/uL 12 85* 14 62* 12 39*   HEMOGLOBIN g/dL 11 6 11 1* 12 0   PLATELETS Thousands/uL 407* 368 361     Results from last 7 days   Lab Units 10/13/22  0504   POTASSIUM mmol/L 4 1   CHLORIDE mmol/L 103   CO2 mmol/L 26   BUN mg/dL 18   CREATININE mg/dL 1 18   EGFR ml/min/1 73sq m 52   CALCIUM mg/dL 10 5*   AST U/L 24   ALT U/L 9*   ALK PHOS U/L 93

## 2022-10-13 NOTE — ANESTHESIA PREPROCEDURE EVALUATION
Procedure:  MRI BRAIN W WO CONTRAST    Relevant Problems   CARDIO   (+) Hyperlipidemia   (+) Hypertension   (+) Intercostal neuralgia      /RENAL   (+) ARF (acute renal failure) ([facility])      MUSCULOSKELETAL   (+) Chondromalacia patellae   (+) DDD (degenerative disc disease), lumbosacral   (+) Traumatic rhabdomyolysis ([facility])      NEURO/PSYCH   (+) Chronic right shoulder pain   (+) Depression   (+) Generalized anxiety disorder   (+) History of rib fracture      PULMONARY   (+) COPD (chronic obstructive pulmonary disease) ([facility])      Respiratory   (+) Aspiration pneumonitis ([facility])   (+) Pulmonary edema      Nervous and Auditory   (+) Toxic metabolic encephalopathy      Other   (+) Bacteremia due to methicillin susceptible Staphylococcus aureus (MSSA)   (+) Tobacco abuse      TTE 10/10/22  •  Left Ventricle: Left ventricular cavity size is normal  Wall thickness is normal  The left ventricular ejection fraction is 62%  Systolic function is normal  Wall motion is normal  Diastolic function is mildly abnormal, consistent with grade I (abnormal) relaxation  Negative for endocarditis on on TTE       MSSA bacteremia 2/2 blood cultures positive   Acute resp failure 2/2 acute diastolic heart failure & volume overload   ATN and severe rhabdomyolysis   Toxic metabolic encephalopathy

## 2022-10-13 NOTE — PLAN OF CARE
Problem: PAIN - ADULT  Goal: Verbalizes/displays adequate comfort level or baseline comfort level  Description: Interventions:  - Encourage patient to monitor pain and request assistance  - Assess pain using appropriate pain scale  - Administer analgesics based on type and severity of pain and evaluate response  - Implement non-pharmacological measures as appropriate and evaluate response  - Consider cultural and social influences on pain and pain management  - Notify physician/advanced practitioner if interventions unsuccessful or patient reports new pain  Outcome: Progressing     Problem: INFECTION - ADULT  Goal: Absence or prevention of progression during hospitalization  Description: INTERVENTIONS:  - Assess and monitor for signs and symptoms of infection  - Monitor lab/diagnostic results  - Monitor all insertion sites, i e  indwelling lines, tubes, and drains  - Monitor endotracheal if appropriate and nasal secretions for changes in amount and color  - [place] appropriate cooling/warming therapies per order  - Administer medications as ordered  - Instruct and encourage patient and family to use good hand hygiene technique  - Identify and instruct in appropriate isolation precautions for identified infection/condition  Outcome: Progressing     Problem: SAFETY ADULT  Goal: Patient will remain free of falls  Description: INTERVENTIONS:  - Educate patient/family on patient safety including physical limitations  - Instruct patient to call for assistance with activity   - Consult OT/PT to assist with strengthening/mobility   - Keep Call bell within reach  - Keep bed low and locked with side rails adjusted as appropriate  - Keep care items and personal belongings within reach  - Initiate and maintain comfort rounds  - Make Fall Risk Sign visible to staff  - Offer Toileting every 2 Hours, in advance of need  - Initiate/Maintain bed alarm  - Obtain necessary fall risk management equipment: walker  - Apply yellow socks and bracelet for high fall risk patients  - Consider moving patient to room near nurses station  Outcome: Progressing  Goal: Maintain or return to baseline ADL function  Description: INTERVENTIONS:  -  Assess patient's ability to carry out ADLs; assess patient's baseline for ADL function and identify physical deficits which impact ability to perform ADLs (bathing, care of mouth/teeth, toileting, grooming, dressing, etc )  - Assess/evaluate cause of self-care deficits   - Assess range of motion  - Assess patient's mobility; develop plan if impaired  - Assess patient's need for assistive devices and provide as appropriate  - Encourage maximum independence but intervene and supervise when necessary  - Involve family in performance of ADLs  - Assess for home care needs following discharge   - Consider OT consult to assist with ADL evaluation and planning for discharge  - Provide patient education as appropriate  Outcome: Progressing  Goal: Maintains/Returns to pre admission functional level  Description: INTERVENTIONS:  - Perform BMAT or MOVE assessment daily    - Set and communicate daily mobility goal to care team and patient/family/caregiver  - Collaborate with rehabilitation services on mobility goals if consulted  - Perform Range of Motion 4-6 times a day  - Reposition patient every 2 hours    - Dangle patient 3-4 times a day  - Stand patient 3 times a day  - Ambulate patient 3-4 times a day  - Out of bed for toileting  - Record patient progress and toleration of activity level   Outcome: Progressing     Problem: DISCHARGE PLANNING  Goal: Discharge to home or other facility with appropriate resources  Description: INTERVENTIONS:  - Identify barriers to discharge w/patient and caregiver  - Arrange for needed discharge resources and transportation as appropriate  - Identify discharge learning needs (meds, wound care, etc )  - Arrange for interpretive services to assist at discharge as needed  - Refer to Case Management Department for coordinating discharge planning if the patient needs post-hospital services based on physician/advanced practitioner order or complex needs related to functional status, cognitive ability, or social support system  Outcome: Progressing     Problem: Knowledge Deficit  Goal: Patient/family/caregiver demonstrates understanding of disease process, treatment plan, medications, and discharge instructions  Description: Complete learning assessment and assess knowledge base    Interventions:  - Provide teaching at level of understanding  - Provide teaching via preferred learning methods  Outcome: Progressing     Problem: Potential for Falls  Goal: Patient will remain free of falls  Description: INTERVENTIONS:  - Educate patient/family on patient safety including physical limitations  - Instruct patient to call for assistance with activity   - Consult OT/PT to assist with strengthening/mobility   - Keep Call bell within reach  - Keep bed low and locked with side rails adjusted as appropriate  - Keep care items and personal belongings within reach  - Initiate and maintain comfort rounds  - Make Fall Risk Sign visible to staff  - Offer Toileting every 2 Hours, in advance of need  - Initiate/Maintain bed alarm  - Obtain necessary fall risk management equipment: walker  - Apply yellow socks and bracelet for high fall risk patients  - Consider moving patient to room near nurses station  Outcome: Progressing     Problem: MOBILITY - ADULT  Goal: Maintain or return to baseline ADL function  Description: INTERVENTIONS:  -  Assess patient's ability to carry out ADLs; assess patient's baseline for ADL function and identify physical deficits which impact ability to perform ADLs (bathing, care of mouth/teeth, toileting, grooming, dressing, etc )  - Assess/evaluate cause of self-care deficits   - Assess range of motion  - Assess patient's mobility; develop plan if impaired  - Assess patient's need for assistive devices and provide as appropriate  - Encourage maximum independence but intervene and supervise when necessary  - Involve family in performance of ADLs  - Assess for home care needs following discharge   - Consider OT consult to assist with ADL evaluation and planning for discharge  - Provide patient education as appropriate  Outcome: Progressing  Goal: Maintains/Returns to pre admission functional level  Description: INTERVENTIONS:  - Perform BMAT or MOVE assessment daily    - Set and communicate daily mobility goal to care team and patient/family/caregiver  - Collaborate with rehabilitation services on mobility goals if consulted  - Perform Range of Motion 4-6 times a day  - Reposition patient every 2 hours  - Dangle patient 3-4 times a day  - Stand patient 3 times a day  - Ambulate patient 3-4 times a day  - Out of bed for toileting  - Record patient progress and toleration of activity level   Outcome: Progressing     Problem: Prexisting or High Potential for Compromised Skin Integrity  Goal: Skin integrity is maintained or improved  Description: INTERVENTIONS:  - Identify patients at risk for skin breakdown  - Assess and monitor skin integrity  - Assess and monitor nutrition and hydration status  - Monitor labs   - Assess for incontinence   - Turn and reposition patient  - Assist with mobility/ambulation  - Relieve pressure over bony prominences  - Avoid friction and shearing  - Provide appropriate hygiene as needed including keeping skin clean and dry  - Evaluate need for skin moisturizer/barrier cream  - Collaborate with interdisciplinary team   - Patient/family teaching  - Consider wound care consult   Outcome: Progressing     Problem: Nutrition/Hydration-ADULT  Goal: Nutrient/Hydration intake appropriate for improving, restoring or maintaining nutritional needs  Description: Monitor and assess patient's nutrition/hydration status for malnutrition   Collaborate with interdisciplinary team and initiate plan and interventions as ordered  Monitor patient's weight and dietary intake as ordered or per policy  Utilize nutrition screening tool and intervene as necessary  Determine patient's food preferences and provide high-protein, high-caloric foods as appropriate  INTERVENTIONS:  - Monitor oral intake, urinary output, labs, and treatment plans  - Assess nutrition and hydration status and recommend course of action  - Evaluate amount of meals eaten  - Assist patient with eating if necessary   - Allow adequate time for meals  - Recommend/ encourage appropriate diets, oral nutritional supplements, and vitamin/mineral supplements  - Order, calculate, and assess calorie counts as needed  - Recommend, monitor, and adjust tube feedings and TPN/PPN based on assessed needs  - Assess need for intravenous fluids  - Provide specific nutrition/hydration education as appropriate  - Include patient/family/caregiver in decisions related to nutrition  Outcome: Progressing     Problem: NEUROSENSORY - ADULT  Goal: Achieves maximal functionality and self care  Description: INTERVENTIONS  - Monitor swallowing and airway patency with patient fatigue and changes in neurological status  - Encourage and assist patient to increase activity and self care     - Encourage visually impaired, hearing impaired and aphasic patients to use assistive/communication devices  Outcome: Progressing  Goal: Achieves stable or improved neurological status  Description: INTERVENTIONS  - Monitor and report changes in neurological status  - Monitor vital signs such as temperature, blood pressure, glucose, and any other labs ordered   - Initiate measures to prevent increased intracranial pressure  - Monitor for seizure activity and implement precautions if appropriate      Outcome: Progressing     Problem: CARDIOVASCULAR - ADULT  Goal: Maintains optimal cardiac output and hemodynamic stability  Description: INTERVENTIONS:  - Monitor I/O, vital signs and rhythm  - Monitor for S/S and trends of decreased cardiac output  - Administer and titrate ordered vasoactive medications to optimize hemodynamic stability  - Assess quality of pulses, skin color and temperature  - Assess for signs of decreased coronary artery perfusion  - Instruct patient to report change in severity of symptoms  Outcome: Progressing     Problem: RESPIRATORY - ADULT  Goal: Achieves optimal ventilation and oxygenation  Description: INTERVENTIONS:  - Assess for changes in respiratory status  - Assess for changes in mentation and behavior  - Position to facilitate oxygenation and minimize respiratory effort  - Oxygen administered by appropriate delivery if ordered  - Initiate smoking cessation education as indicated  - Encourage broncho-pulmonary hygiene including cough, deep breathe, Incentive Spirometry  - Assess the need for suctioning and aspirate as needed  - Assess and instruct to report SOB or any respiratory difficulty  - Respiratory Therapy support as indicated  Outcome: Progressing     Problem: GASTROINTESTINAL - ADULT  Goal: Maintains adequate nutritional intake  Description: INTERVENTIONS:  - Monitor percentage of each meal consumed  - Identify factors contributing to decreased intake, treat as appropriate  - Assist with meals as needed  - Monitor I&O, weight, and lab values if indicated  - Obtain nutrition services referral as needed  Outcome: Progressing     Problem: METABOLIC, FLUID AND ELECTROLYTES - ADULT  Goal: Electrolytes maintained within normal limits  Description: INTERVENTIONS:  - Monitor labs and assess patient for signs and symptoms of electrolyte imbalances  - Administer electrolyte replacement as ordered  - Monitor response to electrolyte replacements, including repeat lab results as appropriate  - Instruct patient on fluid and nutrition as appropriate  Outcome: Progressing  Goal: Fluid balance maintained  Description: INTERVENTIONS:  - Monitor labs   - Monitor I/O and WT  - Instruct patient on fluid and nutrition as appropriate  - Assess for signs & symptoms of volume excess or deficit  Outcome: Progressing     Problem: SKIN/TISSUE INTEGRITY - ADULT  Goal: Skin Integrity remains intact(Skin Breakdown Prevention)  Description: Assess:  -Perform Erickson assessment every shift  -Clean and moisturize skin every day  -Inspect skin when repositioning, toileting, and assisting with ADLS  -Assess extremities for adequate circulation and sensation     Bed Management:  -Have minimal linens on bed & keep smooth, unwrinkled  -Change linens as needed when moist or perspiring  -Avoid sitting or lying in one position for more than 2 hours while in bed  -Keep HOB at 45 degrees     Toileting:  -Offer bedside commode  -Assess for incontinence every 2 hours  -Use incontinent care products after each incontinent episode such as brief    Activity:  -Mobilize patient 3-4 times a day  -Encourage activity and walks on unit  -Encourage or provide ROM exercises   -Turn and reposition patient every 2 Hours  -Use appropriate equipment to lift or move patient in bed  -Instruct/ Assist with weight shifting every 2 when out of bed in chair  -Consider limitation of chair time 2 hour intervals    Skin Care:  -Avoid use of baby powder, tape, friction and shearing, hot water or constrictive clothing  -Relieve pressure over bony prominences using pillows  -Do not massage red bony areas    Next Steps:  -Teach patient strategies to minimize risks such as walker  -Consider consults to  interdisciplinary teams such as wound  Outcome: Progressing  Goal: Incision(s), wounds(s) or drain site(s) healing without S/S of infection  Description: INTERVENTIONS  - Assess and document dressing, incision, wound bed, drain sites and surrounding tissue  - Provide patient and family education  - Perform skin care/dressing changes every PRN  Outcome: Progressing     Problem: MUSCULOSKELETAL - ADULT  Goal: Maintain or return mobility to safest level of function  Description: INTERVENTIONS:  - Assess patient's ability to carry out ADLs; assess patient's baseline for ADL function and identify physical deficits which impact ability to perform ADLs (bathing, care of mouth/teeth, toileting, grooming, dressing, etc )  - Assess/evaluate cause of self-care deficits   - Assess range of motion  - Assess patient's mobility  - Assess patient's need for assistive devices and provide as appropriate  - Encourage maximum independence but intervene and supervise when necessary  - Involve family in performance of ADLs  - Assess for home care needs following discharge   - Consider OT consult to assist with ADL evaluation and planning for discharge  - Provide patient education as appropriate  Outcome: Progressing

## 2022-10-13 NOTE — PROGRESS NOTES
Progress Note - Cardiology   Isra Perez 45 y o  female MRN: [id number]  Unit/Bed#: [place] [address] -01 Encounter: [id number]      Assessment/Recommendations/Discussion:   1  MSSA bacteremia 2/2 blood cultures positive  2  Acute respiratory failure, acute diastolic heart failure due to volume overload  3  Toxic metabolic encephalopathy  4  LV Dysfunction  5  Acute renal failure presumed due to ATN and severe rhabdomyolysis  6  Rhabdomyolysis  7  Depression    Results from last 7 days   Lab Units 10/10/22  0925   SL CV LV EF  62        PLAN  • She is more awake today but still very confused, not oriented to place or time and definitely not able to consent for herself  • She did have a repeat echo 3 days ago which is negative for findings of endocarditis  • Her blood cultures remain clear  • She is going to require general anesthesia for upcoming MRI  • Overall given her mental status, she remains suboptimal candidate for transesophageal echo and is very high risk of aspiration  Overall plan is likely for rehab after the hospitalization  Would plan to complete full IV antibiotic course without CHERYLE  Subjective:   HPI  She is awake, however not oriented to place or time and does not coherently answer questions      Review of Systems: As noted in HPI  Rest of ROS is negative  Vitals:   /82   Pulse (!) 120   Temp 98 8 °F (37 1 °C)   Resp 18   Ht 5' 6" (1 676 m)   Wt 51 6 kg (113 lb 12 1 oz)   LMP 03/14/2019 (Approximate)   SpO2 96%   BMI 18 36 kg/m²   I/O       10/11 0701  10/12 0700 10/12 0701  10/13 0700 10/13 0701  10/14 0700    P  O  118 480     I V  (mL/kg)  400 (7 8)     IV Piggyback 100      Total Intake(mL/kg) 218 (4 2) 880 (17 1)     Net +218 +880            Unmeasured Urine Occurrence 2 x 1 x         Weight (last 2 days)     Date/Time Weight    10/13/22 0600 51 6 (113 76)    10/12/22 0600 51 6 (113 76)          Physical Exam   Constitutional: awake, not oriented to place or time, still confused, calm, easily distracted and pulling at her IV lines, currently on one-to-one observation   Head: Normocephalic, without obvious abnormality, atraumatic  Eyes: conjunctivae clear and moist  Sclera anicteric  No xanthelasmas  Pupils equal bilaterally  Extraocular motions are full  Ear nose mouth and throat: ears are symmetrical bilaterally, hearing appears to be equal bilaterally, no nasal discharge or epistaxis, oropharynx is clear with moist mucous membranes  Neck:  Trachea is midline, neck is supple, no thyromegaly or significant lymphadenopathy, there is full range of motion  Lungs: clear to auscultation bilaterally, no wheezes, no rales, no rhonchi, no accessory muscle use, breathing is nonlabored  Heart: regular rate and rhythm, S1, S2 normal, no murmur, no click, no rub and no gallop, no lower extremity edema  Abdomen: soft, non-tender; bowel sounds normal; no masses,  no organomegaly  Skin: Skin is warm, dry, intact  No obvious rashes or lesions on exposed extremities  Nail beds are pink with no cyanosis or clubbing        Lab Results:  Results from last 7 days   Lab Units 10/13/22  0504   WBC Thousand/uL 9 99   HEMOGLOBIN g/dL 9 8*   HEMATOCRIT % 30 8*   PLATELETS Thousands/uL 266     Results from last 7 days   Lab Units 10/13/22  0504   POTASSIUM mmol/L 4 1   CHLORIDE mmol/L 103   CO2 mmol/L 26   BUN mg/dL 18   CREATININE mg/dL 1 18   CALCIUM mg/dL 10 5*   ALK PHOS U/L 93   ALT U/L 9*   AST U/L 24     Results from last 7 days   Lab Units 10/13/22  0504   POTASSIUM mmol/L 4 1   CHLORIDE mmol/L 103   CO2 mmol/L 26   BUN mg/dL 18   CREATININE mg/dL 1 18   CALCIUM mg/dL 10 5*           Medications:    Current Facility-Administered Medications:   •  acetaminophen (TYLENOL) tablet 650 mg, 650 mg, Oral, Q6H PRN, Atul Adan DO, 650 mg at 09/22/22 1040  •  ALPRAZolam (XANAX) tablet 0 5 mg, 0 5 mg, Oral, HS PRN, Adrianne Underwood MD, 0 5 mg at 10/12/22 2005  •  amLODIPine (NORVASC) tablet 10 mg, 10 mg, Oral, Daily, Asad Silva MD, 10 mg at 10/13/22 4817  •  ceFAZolin (ANCEF) IVPB (premix in dextrose) 2,000 mg 50 mL, 2,000 mg, Intravenous, Q8H, Donna Pierre MD, Last Rate: 100 mL/hr at 10/13/22 0540, 2,000 mg at 10/13/22 0540  •  gabapentin (NEURONTIN) capsule 300 mg, 300 mg, Oral, TID, Lora Enrique MD, 300 mg at 10/13/22 0947  •  heparin (porcine) subcutaneous injection 5,000 Units, 5,000 Units, Subcutaneous, Q8H [address], Dustin DO Dayron, 5,000 Units at 10/13/22 0540  •  HYDROmorphone (DILAUDID) injection 0 5 mg, 0 5 mg, Intravenous, Q4H PRN, Dustin DO Dayron, 0 5 mg at 10/01/22 0031  •  levalbuterol (XOPENEX) inhalation solution 1 25 mg, 1 25 mg, Nebulization, Q4H PRN, Yaquelin Barboza DO  •  loperamide (IMODIUM) oral liquid 2 mg, 2 mg, Oral, 4x Daily PRN, Krunal Chavez MD, 2 mg at 10/09/22 2336  •  metoprolol tartrate (LOPRESSOR) tablet 25 mg, 25 mg, Oral, Q12H [address], Krunal Chavez MD, 25 mg at 10/13/22 0948  •  moisture barrier miconazole 2% cream (aka VANDANA MOISTURE BARRIER ANTIFUNGAL CREAM), , Topical, BID, Sam Jose Monday, DO, 1 application at 98/43/65 0948  •  nicotine (NICODERM CQ) 14 mg/24hr TD 24 hr patch 1 patch, 1 patch, Transdermal, Daily, Atul Adan DO, 1 patch at 10/12/22 0846  •  OLANZapine (ZyPREXA ZYDIS) dispersible tablet 5 mg, 5 mg, Oral, Q6H PRN, Yaquelin Barboza DO, 5 mg at 10/09/22 2133  •  ondansetron (ZOFRAN) injection 4 mg, 4 mg, Intravenous, Q4H PRN, Dustin Padgett,   •  QUEtiapine (SEROquel) tablet 50 mg, 50 mg, Oral, BID, Krunal Chavez MD, 50 mg at 10/13/22 5103  •  saccharomyces boulardii (FLORASTOR) capsule 250 mg, 250 mg, Oral, BID, Krunal Chavez MD, 250 mg at 10/13/22 2247  •  thiamine (VITAMIN B1) 100 mg in sodium chloride 0 9 % 50 mL IVPB, 100 mg, Intravenous, Q24H, Lora Enrique MD, Last Rate: 100 mL/hr at 10/12/22 2101, 100 mg at 10/12/22 2101  •  venlafaxine (EFFEXOR-XR) 24 hr capsule 150 mg, 150 mg, Oral, Daily, Lora Enrique MD, 150 mg at 10/13/22 3978    This note was completed in part utilizing M-Modal Fluency Direct Software  Grammatical errors, random word insertions, spelling mistakes, and incomplete sentences may be an occasional consequence of this system secondary to software limitations, ambient noise, and hardware issues  If you have any questions or concerns about the content, text, or information contained within the body of this dictation, please contact the provider for clarification      Sharon Herring DO, [facility]  10/13/2022 10:47 AM

## 2022-10-14 ENCOUNTER — APPOINTMENT (OUTPATIENT)
Dept: RADIOLOGY | Facility: HOSPITAL | Age: 52
End: 2022-10-14

## 2022-10-14 RX ORDER — LORAZEPAM 2 MG/ML
1 INJECTION INTRAMUSCULAR ONCE
Status: COMPLETED | OUTPATIENT
Start: 2022-10-14 | End: 2022-10-14

## 2022-10-14 RX ORDER — ONDANSETRON 2 MG/ML
INJECTION INTRAMUSCULAR; INTRAVENOUS AS NEEDED
Status: DISCONTINUED | OUTPATIENT
Start: 2022-10-14 | End: 2022-10-14

## 2022-10-14 RX ORDER — SODIUM CHLORIDE 9 MG/ML
INJECTION, SOLUTION INTRAVENOUS CONTINUOUS PRN
Status: DISCONTINUED | OUTPATIENT
Start: 2022-10-14 | End: 2022-10-14

## 2022-10-14 RX ORDER — PROPOFOL 10 MG/ML
INJECTION, EMULSION INTRAVENOUS AS NEEDED
Status: DISCONTINUED | OUTPATIENT
Start: 2022-10-14 | End: 2022-10-14

## 2022-10-14 RX ADMIN — LORAZEPAM 1 MG: 2 INJECTION INTRAMUSCULAR; INTRAVENOUS at 12:02

## 2022-10-14 RX ADMIN — MICONAZOLE NITRATE 1 APPLICATION: 20 CREAM TOPICAL at 08:07

## 2022-10-14 RX ADMIN — GABAPENTIN 300 MG: 300 CAPSULE ORAL at 22:14

## 2022-10-14 RX ADMIN — SODIUM CHLORIDE: 9 INJECTION, SOLUTION INTRAVENOUS at 13:50

## 2022-10-14 RX ADMIN — GABAPENTIN 300 MG: 300 CAPSULE ORAL at 08:07

## 2022-10-14 RX ADMIN — THIAMINE HYDROCHLORIDE 100 MG: 100 INJECTION, SOLUTION INTRAMUSCULAR; INTRAVENOUS at 23:20

## 2022-10-14 RX ADMIN — CEFAZOLIN SODIUM 2000 MG: 2 SOLUTION INTRAVENOUS at 22:13

## 2022-10-14 RX ADMIN — Medication 250 MG: at 08:07

## 2022-10-14 RX ADMIN — QUETIAPINE FUMARATE 50 MG: 25 TABLET ORAL at 08:07

## 2022-10-14 RX ADMIN — CEFAZOLIN SODIUM 2000 MG: 2 SOLUTION INTRAVENOUS at 05:29

## 2022-10-14 RX ADMIN — VENLAFAXINE HYDROCHLORIDE 150 MG: 150 CAPSULE, EXTENDED RELEASE ORAL at 08:07

## 2022-10-14 RX ADMIN — METOPROLOL TARTRATE 25 MG: 25 TABLET, FILM COATED ORAL at 08:07

## 2022-10-14 RX ADMIN — ONDANSETRON 4 MG: 2 INJECTION INTRAMUSCULAR; INTRAVENOUS at 13:50

## 2022-10-14 RX ADMIN — GADOBUTROL 5 ML: 604.72 INJECTION INTRAVENOUS at 14:32

## 2022-10-14 RX ADMIN — ALPRAZOLAM 0.5 MG: 0.5 TABLET ORAL at 23:35

## 2022-10-14 RX ADMIN — PROPOFOL 150 MG: 10 INJECTION, EMULSION INTRAVENOUS at 13:50

## 2022-10-14 RX ADMIN — HEPARIN SODIUM 5000 UNITS: 5000 INJECTION INTRAVENOUS; SUBCUTANEOUS at 22:13

## 2022-10-14 RX ADMIN — HEPARIN SODIUM 5000 UNITS: 5000 INJECTION INTRAVENOUS; SUBCUTANEOUS at 05:09

## 2022-10-14 RX ADMIN — AMLODIPINE BESYLATE 10 MG: 10 TABLET ORAL at 08:07

## 2022-10-14 RX ADMIN — METOPROLOL TARTRATE 25 MG: 25 TABLET, FILM COATED ORAL at 22:14

## 2022-10-14 NOTE — ANESTHESIA POSTPROCEDURE EVALUATION
Post-Op Assessment Note    CV Status:  Stable  Pain Score: 0    Pain management: adequate     Mental Status:  Alert and awake   Hydration Status:  Euvolemic   PONV Controlled:  Controlled   Airway Patency:  Patent      Post Op Vitals Reviewed: Yes      Staff: CRNA, Anesthesiologist         No complications documented      BP   118/68   Temp   97   Pulse 85   Resp   14   SpO2   99

## 2022-10-14 NOTE — ANESTHESIA PREPROCEDURE EVALUATION
Procedure:  MRI BRAIN W WO CONTRAST    Relevant Problems   CARDIO   (+) Hyperlipidemia   (+) Hypertension   (+) Intercostal neuralgia      /RENAL   (+) ARF (acute renal failure) ([facility])      MUSCULOSKELETAL   (+) Chondromalacia patellae   (+) DDD (degenerative disc disease), lumbosacral   (+) Traumatic rhabdomyolysis ([facility])      NEURO/PSYCH   (+) Chronic right shoulder pain   (+) Depression   (+) Generalized anxiety disorder   (+) History of rib fracture      PULMONARY   (+) COPD (chronic obstructive pulmonary disease) ([facility])        Physical Exam    Airway    Mallampati score: II  TM Distance: >3 FB  Neck ROM: full     Dental   No notable dental hx     Cardiovascular  Cardiovascular exam normal    Pulmonary  Pulmonary exam normal     Other Findings        Anesthesia Plan  ASA Score- 3     Anesthesia Type- general with ASA Monitors  Additional Monitors:   Airway Plan: LMA  Plan Factors-    Chart reviewed  Existing labs reviewed  Patient summary reviewed  Patient is a current smoker  Induction- intravenous  Postoperative Plan-     Informed Consent- Anesthetic plan and risks discussed with spouse and healthcare power of   I personally reviewed this patient with the CRNA  Discussed and agreed on the Anesthesia Plan with the CRNA  Danelle Zamorano

## 2022-10-14 NOTE — PLAN OF CARE
Problem: PAIN - ADULT  Goal: Verbalizes/displays adequate comfort level or baseline comfort level  Description: Interventions:  - Encourage patient to monitor pain and request assistance  - Assess pain using appropriate pain scale  - Administer analgesics based on type and severity of pain and evaluate response  - Implement non-pharmacological measures as appropriate and evaluate response  - Consider cultural and social influences on pain and pain management  - Notify physician/advanced practitioner if interventions unsuccessful or patient reports new pain  Outcome: Progressing     Problem: INFECTION - ADULT  Goal: Absence or prevention of progression during hospitalization  Description: INTERVENTIONS:  - Assess and monitor for signs and symptoms of infection  - Monitor lab/diagnostic results  - Monitor all insertion sites, i e  indwelling lines, tubes, and drains  - Monitor endotracheal if appropriate and nasal secretions for changes in amount and color  - [place] appropriate cooling/warming therapies per order  - Administer medications as ordered  - Instruct and encourage patient and family to use good hand hygiene technique  - Identify and instruct in appropriate isolation precautions for identified infection/condition  Outcome: Progressing     Problem: SAFETY ADULT  Goal: Patient will remain free of falls  Description: INTERVENTIONS:  - Educate patient/family on patient safety including physical limitations  - Instruct patient to call for assistance with activity   - Consult OT/PT to assist with strengthening/mobility   - Keep Call bell within reach  - Keep bed low and locked with side rails adjusted as appropriate  - Keep care items and personal belongings within reach  - Initiate and maintain comfort rounds  - Make Fall Risk Sign visible to staff  - Offer Toileting every 2 Hours, in advance of need  - Initiate/Maintain bed alarm  - Obtain necessary fall risk management equipment: walker  - Apply yellow socks and bracelet for high fall risk patients  - Consider moving patient to room near nurses station  Outcome: Progressing  Goal: Maintain or return to baseline ADL function  Description: INTERVENTIONS:  -  Assess patient's ability to carry out ADLs; assess patient's baseline for ADL function and identify physical deficits which impact ability to perform ADLs (bathing, care of mouth/teeth, toileting, grooming, dressing, etc )  - Assess/evaluate cause of self-care deficits   - Assess range of motion  - Assess patient's mobility; develop plan if impaired  - Assess patient's need for assistive devices and provide as appropriate  - Encourage maximum independence but intervene and supervise when necessary  - Involve family in performance of ADLs  - Assess for home care needs following discharge   - Consider OT consult to assist with ADL evaluation and planning for discharge  - Provide patient education as appropriate  Outcome: Progressing  Goal: Maintains/Returns to pre admission functional level  Description: INTERVENTIONS:  - Perform BMAT or MOVE assessment daily    - Set and communicate daily mobility goal to care team and patient/family/caregiver  - Collaborate with rehabilitation services on mobility goals if consulted  - Perform Range of Motion 4-6 times a day  - Reposition patient every 2 hours    - Dangle patient 3-4 times a day  - Stand patient 3 times a day  - Ambulate patient 3-4 times a day  - Out of bed for toileting  - Record patient progress and toleration of activity level   Outcome: Progressing     Problem: DISCHARGE PLANNING  Goal: Discharge to home or other facility with appropriate resources  Description: INTERVENTIONS:  - Identify barriers to discharge w/patient and caregiver  - Arrange for needed discharge resources and transportation as appropriate  - Identify discharge learning needs (meds, wound care, etc )  - Arrange for interpretive services to assist at discharge as needed  - Refer to Case Management Department for coordinating discharge planning if the patient needs post-hospital services based on physician/advanced practitioner order or complex needs related to functional status, cognitive ability, or social support system  Outcome: Progressing     Problem: Knowledge Deficit  Goal: Patient/family/caregiver demonstrates understanding of disease process, treatment plan, medications, and discharge instructions  Description: Complete learning assessment and assess knowledge base    Interventions:  - Provide teaching at level of understanding  - Provide teaching via preferred learning methods  Outcome: Progressing     Problem: Potential for Falls  Goal: Patient will remain free of falls  Description: INTERVENTIONS:  - Educate patient/family on patient safety including physical limitations  - Instruct patient to call for assistance with activity   - Consult OT/PT to assist with strengthening/mobility   - Keep Call bell within reach  - Keep bed low and locked with side rails adjusted as appropriate  - Keep care items and personal belongings within reach  - Initiate and maintain comfort rounds  - Make Fall Risk Sign visible to staff  - Offer Toileting every 2 Hours, in advance of need  - Initiate/Maintain bed alarm  - Obtain necessary fall risk management equipment: walker  - Apply yellow socks and bracelet for high fall risk patients  - Consider moving patient to room near nurses station  Outcome: Progressing     Problem: MOBILITY - ADULT  Goal: Maintain or return to baseline ADL function  Description: INTERVENTIONS:  -  Assess patient's ability to carry out ADLs; assess patient's baseline for ADL function and identify physical deficits which impact ability to perform ADLs (bathing, care of mouth/teeth, toileting, grooming, dressing, etc )  - Assess/evaluate cause of self-care deficits   - Assess range of motion  - Assess patient's mobility; develop plan if impaired  - Assess patient's need for assistive devices and provide as appropriate  - Encourage maximum independence but intervene and supervise when necessary  - Involve family in performance of ADLs  - Assess for home care needs following discharge   - Consider OT consult to assist with ADL evaluation and planning for discharge  - Provide patient education as appropriate  Outcome: Progressing  Goal: Maintains/Returns to pre admission functional level  Description: INTERVENTIONS:  - Perform BMAT or MOVE assessment daily    - Set and communicate daily mobility goal to care team and patient/family/caregiver  - Collaborate with rehabilitation services on mobility goals if consulted  - Perform Range of Motion 4-6 times a day  - Reposition patient every 2 hours  - Dangle patient 3-4 times a day  - Stand patient 3 times a day  - Ambulate patient 3-4 times a day  - Out of bed for toileting  - Record patient progress and toleration of activity level   Outcome: Progressing     Problem: Prexisting or High Potential for Compromised Skin Integrity  Goal: Skin integrity is maintained or improved  Description: INTERVENTIONS:  - Identify patients at risk for skin breakdown  - Assess and monitor skin integrity  - Assess and monitor nutrition and hydration status  - Monitor labs   - Assess for incontinence   - Turn and reposition patient  - Assist with mobility/ambulation  - Relieve pressure over bony prominences  - Avoid friction and shearing  - Provide appropriate hygiene as needed including keeping skin clean and dry  - Evaluate need for skin moisturizer/barrier cream  - Collaborate with interdisciplinary team   - Patient/family teaching  - Consider wound care consult   Outcome: Progressing     Problem: Nutrition/Hydration-ADULT  Goal: Nutrient/Hydration intake appropriate for improving, restoring or maintaining nutritional needs  Description: Monitor and assess patient's nutrition/hydration status for malnutrition   Collaborate with interdisciplinary team and initiate plan and interventions as ordered  Monitor patient's weight and dietary intake as ordered or per policy  Utilize nutrition screening tool and intervene as necessary  Determine patient's food preferences and provide high-protein, high-caloric foods as appropriate  INTERVENTIONS:  - Monitor oral intake, urinary output, labs, and treatment plans  - Assess nutrition and hydration status and recommend course of action  - Evaluate amount of meals eaten  - Assist patient with eating if necessary   - Allow adequate time for meals  - Recommend/ encourage appropriate diets, oral nutritional supplements, and vitamin/mineral supplements  - Order, calculate, and assess calorie counts as needed  - Recommend, monitor, and adjust tube feedings and TPN/PPN based on assessed needs  - Assess need for intravenous fluids  - Provide specific nutrition/hydration education as appropriate  - Include patient/family/caregiver in decisions related to nutrition  Outcome: Progressing     Problem: NEUROSENSORY - ADULT  Goal: Achieves maximal functionality and self care  Description: INTERVENTIONS  - Monitor swallowing and airway patency with patient fatigue and changes in neurological status  - Encourage and assist patient to increase activity and self care     - Encourage visually impaired, hearing impaired and aphasic patients to use assistive/communication devices  Outcome: Progressing  Goal: Achieves stable or improved neurological status  Description: INTERVENTIONS  - Monitor and report changes in neurological status  - Monitor vital signs such as temperature, blood pressure, glucose, and any other labs ordered   - Initiate measures to prevent increased intracranial pressure  - Monitor for seizure activity and implement precautions if appropriate      Outcome: Progressing     Problem: CARDIOVASCULAR - ADULT  Goal: Maintains optimal cardiac output and hemodynamic stability  Description: INTERVENTIONS:  - Monitor I/O, vital signs and rhythm  - Monitor for S/S and trends of decreased cardiac output  - Administer and titrate ordered vasoactive medications to optimize hemodynamic stability  - Assess quality of pulses, skin color and temperature  - Assess for signs of decreased coronary artery perfusion  - Instruct patient to report change in severity of symptoms  Outcome: Progressing     Problem: RESPIRATORY - ADULT  Goal: Achieves optimal ventilation and oxygenation  Description: INTERVENTIONS:  - Assess for changes in respiratory status  - Assess for changes in mentation and behavior  - Position to facilitate oxygenation and minimize respiratory effort  - Oxygen administered by appropriate delivery if ordered  - Initiate smoking cessation education as indicated  - Encourage broncho-pulmonary hygiene including cough, deep breathe, Incentive Spirometry  - Assess the need for suctioning and aspirate as needed  - Assess and instruct to report SOB or any respiratory difficulty  - Respiratory Therapy support as indicated  Outcome: Progressing     Problem: GASTROINTESTINAL - ADULT  Goal: Maintains adequate nutritional intake  Description: INTERVENTIONS:  - Monitor percentage of each meal consumed  - Identify factors contributing to decreased intake, treat as appropriate  - Assist with meals as needed  - Monitor I&O, weight, and lab values if indicated  - Obtain nutrition services referral as needed  Outcome: Progressing     Problem: METABOLIC, FLUID AND ELECTROLYTES - ADULT  Goal: Electrolytes maintained within normal limits  Description: INTERVENTIONS:  - Monitor labs and assess patient for signs and symptoms of electrolyte imbalances  - Administer electrolyte replacement as ordered  - Monitor response to electrolyte replacements, including repeat lab results as appropriate  - Instruct patient on fluid and nutrition as appropriate  Outcome: Progressing  Goal: Fluid balance maintained  Description: INTERVENTIONS:  - Monitor labs   - Monitor I/O and WT  - Instruct patient on fluid and nutrition as appropriate  - Assess for signs & symptoms of volume excess or deficit  Outcome: Progressing     Problem: SKIN/TISSUE INTEGRITY - ADULT  Goal: Skin Integrity remains intact(Skin Breakdown Prevention)  Description: Assess:  -Perform [place] assessment every shift  -Clean and moisturize skin every day  -Inspect skin when repositioning, toileting, and assisting with ADLS  -Assess extremities for adequate circulation and sensation     Bed Management:  -Have minimal linens on bed & keep smooth, unwrinkled  -Change linens as needed when moist or perspiring  -Avoid sitting or lying in one position for more than 2 hours while in bed  -Keep HOB at 45 degrees     Toileting:  -Offer bedside commode  -Assess for incontinence every 2 hours  -Use incontinent care products after each incontinent episode such as brief    Activity:  -Mobilize patient 3-4 times a day  -Encourage activity and walks on unit  -Encourage or provide ROM exercises   -Turn and reposition patient every 2 Hours  -Use appropriate equipment to lift or move patient in bed  -Instruct/ Assist with weight shifting every 2 when out of bed in chair  -Consider limitation of chair time 2 hour intervals    Skin Care:  -Avoid use of baby powder, tape, friction and shearing, hot water or constrictive clothing  -Relieve pressure over bony prominences using pillows  -Do not massage red bony areas    Next Steps:  -Teach patient strategies to minimize risks such as walker  -Consider consults to  interdisciplinary teams such as wound  Outcome: Progressing  Goal: Incision(s), wounds(s) or drain site(s) healing without S/S of infection  Description: INTERVENTIONS  - Assess and document dressing, incision, wound bed, drain sites and surrounding tissue  - Provide patient and family education  - Perform skin care/dressing changes every PRN  Outcome: Progressing     Problem: MUSCULOSKELETAL - ADULT  Goal: Maintain or return mobility to safest level of function  Description: INTERVENTIONS:  - Assess patient's ability to carry out ADLs; assess patient's baseline for ADL function and identify physical deficits which impact ability to perform ADLs (bathing, care of mouth/teeth, toileting, grooming, dressing, etc )  - Assess/evaluate cause of self-care deficits   - Assess range of motion  - Assess patient's mobility  - Assess patient's need for assistive devices and provide as appropriate  - Encourage maximum independence but intervene and supervise when necessary  - Involve family in performance of ADLs  - Assess for home care needs following discharge   - Consider OT consult to assist with ADL evaluation and planning for discharge  - Provide patient education as appropriate  Outcome: Progressing

## 2022-10-14 NOTE — PROGRESS NOTES
Progress Note - Infectious Disease   Devin Zhang 45 y o  female MRN: [id number]  Unit/Bed#: [place] [address] -01 Encounter: [id number]      Impression/Plan:  1  MSSA bacteremia-with suspected cutaneous versus phlebitis source as the patient had right upper extremity erythema surrounding a prior IV site in addition to a palpable cord left antecubital region  Transthoracic echocardiogram negative without vegetation however the aortic valve is not well visualized   The bacteremia is cleared and the patient remains hemodynamically stable  She seems to be tolerating the antibiotics without difficulty  Unable to safely do transesophageal echocardiogram by report  -continue cefazolin through 10/25/2022 to complete 4 weeks of IV antibiotics  -recheck CBC with diff and creatinine at least weekly while on the IV antibiotics  -supportive care    2  Toxic Metabolic encephalopathy   Likely multifactorial etiology with uremia playing a role   This was initially thought to be secondary to gabapentin and morphine use in the setting of renal failure, hypotension, rhabdomyolysis   MRI brain negative   Lumbar puncture with opening pressure 26  Negative ME panel  CSF fluid culture not collected   The most likely to be secondary to metabolic issues, medication effect, and possible Wernicke's encephalopathy  Psychiatric issues also likely playing a role  Mental status remains significantly altered  Neurology has reassessed patient  She will receive thiamine, no additional recommendations at this time    -serial neuro exams  -monitor cognition, mood, and mental status  -treat metabolic issues and psychiatric issues  -supportive care  -continue follow up with behavioral health  -continue follow up with neurology   -patient getting MRI brain under sedation today     3  Acute renal failure   Creatinine on admission 5 88 with CK over 32,000   Creatinine reached plateau, now down trending   Likely multifactorial etiology in the setting of rhabdomyolysis and dehydration  Likely ANUJA/ATN    Most recent creatinine was improved to 1  18   -monitor creatinine  -dose adjust antibiotics for renal function as needed  -avoid nephrotoxins  -continue follow up with nephrology     4  Tobacco abuse   Encourage cessation as recommended by primary team   -NRT per primary service     5  Antibiotic allergy  Reports hives with penicillin  Patient has been tolerating cephalosporin without difficulty  -monitor patient for adverse medication reactions     Discussed the above management plan with the primary service    Will see the patient again 10/17/2022  Please call if questions    Antibiotics:  Cefazolin 17    Subjective:  Patient has no fever, chills, sweats; no nausea, vomiting, diarrhea; no cough, shortness of breath; no pain  No new symptoms  She remains quite confused bit interact    Objective:  Vitals:  Temp:  [97 5 °F (36 4 °C)-98 9 °F (37 2 °C)] 98 9 °F (37 2 °C)  HR:  [] 108  Resp:  [20] 20  BP: (117-118)/(71) 118/71  SpO2:  [96 %-98 %] 97 %  Temp (24hrs), Av 1 °F (36 7 °C), Min:97 5 °F (36 4 °C), Max:98 9 °F (37 2 °C)  Current: Temperature: 98 9 °F (37 2 °C)    Physical Exam:   General Appearance:  Alert, confused, interactive, nontoxic, no acute distress  Throat: Oropharynx moist without lesions  Lungs:   Clear to auscultation bilaterally; no wheezes, rhonchi or rales; respirations unlabored   Heart:  Tachycardia; no murmur, rub or gallop   Abdomen:   Soft, non-tender, non-distended, positive bowel sounds  Extremities: No clubbing, cyanosis or edema   Skin: No new rashes or lesions  No draining wounds noted         Labs, Imaging, & Other studies:   All pertinent labs and imaging studies were personally reviewed  Results from last 7 days   Lab Units 10/13/22  0504 10/10/22  0455 10/09/22  0432   WBC Thousand/uL 9 99 12 85* 14 62*   HEMOGLOBIN g/dL 9 8* 11 6 11 1*   PLATELETS Thousands/uL 266 407* 368     Results from last 7 days   Lab Units 10/13/22  0504 10/10/22  0455 10/09/22  0432   SODIUM mmol/L 138 140 134*   POTASSIUM mmol/L 4 1 4 0 3 5   CHLORIDE mmol/L 103 104 97   CO2 mmol/L 26 27 28   BUN mg/dL 18 20 22   CREATININE mg/dL 1 18 1 37* 1 68*   EGFR ml/min/1 73sq m 52 43 34   CALCIUM mg/dL 10 5* 11 1* 11 2*   AST U/L 24  --   --    ALT U/L 9*  --   --    ALK PHOS U/L 93  --   --

## 2022-10-14 NOTE — PROGRESS NOTES
Progress Note - Bentley Stout 45 y o  female MRN: [id number]    Unit/Bed#: [place] 2 -01 Encounter: [id number]      Subjective: The patient is sleeping at present  In general, she has been doing better  She has been less agitated  She has been more interactive  She was able to walk around the schulz with the assistance of 1 person  Physical Exam:   Temp:  [97 5 °F (36 4 °C)-98 9 °F (37 2 °C)] 98 9 °F (37 2 °C)  HR:  [] 108  Resp:  [20] 20  BP: (117-118)/(71) 118/71    Gen:  Well-developed, frail, in no distress  Neck:  Supple  No lymphadenopathy, goiter, or bruit  Heart:  Regular rhythm  No murmur, gallop, or rub  Lungs:  Clear to auscultation and percussion  No wheezing, rales    Abd: , or rhonchi  Soft with active bowel sounds  No mass, tenderness, or organomegaly  Extremities:  No clubbing, cyanosis, or edema  No calf tenderness  Neuro:  Sleepy but arousable  Moves all limbs  Skin:  Warm and dry  LABS:   No new labs  Assessment/Plan:  1  Encephalopathy secondary to multiple metabolic derangements at the time of admission, slowly improving  2  MSSA bacteremia  3  History of depression  4  Lumbar radiculopathy  5  Rhabdomyolysis, resolved  6  Acute renal failure on admission, resolved  7  Mild hypercalcemia  8  Chronic narcotic therapy  9  Tobacco abuse    Overall, the patient's mental status has been slowly improving  She is still agitated at times and is impulsive  She will be getting an MRI under anesthesia this afternoon  She remains on IV cefazolin  Physical and occupational therapy will be continued  The patient is being evaluated by [facility]  They are equipped to deal with people with brain injury        VTE Pharmacologic Prophylaxis: Heparin  VTE Mechanical Prophylaxis: sequential compression device

## 2022-10-14 NOTE — NURSING NOTE
Patient had episode of emesis after taking taking AM medications  Patient does not report being nauseous; episode appeared to be related to reflux  Patient NPO for MRI, will refrain from giving PO medications for time being      Emma Arellano 10/14/2022 8:23 AM

## 2022-10-14 NOTE — PLAN OF CARE
Problem: PAIN - ADULT  Goal: Verbalizes/displays adequate comfort level or baseline comfort level  Description: Interventions:  - Encourage patient to monitor pain and request assistance  - Assess pain using appropriate pain scale  - Administer analgesics based on type and severity of pain and evaluate response  - Implement non-pharmacological measures as appropriate and evaluate response  - Consider cultural and social influences on pain and pain management  - Notify physician/advanced practitioner if interventions unsuccessful or patient reports new pain  Outcome: Progressing     Problem: INFECTION - ADULT  Goal: Absence or prevention of progression during hospitalization  Description: INTERVENTIONS:  - Assess and monitor for signs and symptoms of infection  - Monitor lab/diagnostic results  - Monitor all insertion sites, i e  indwelling lines, tubes, and drains  - Monitor endotracheal if appropriate and nasal secretions for changes in amount and color  - [place] appropriate cooling/warming therapies per order  - Administer medications as ordered  - Instruct and encourage patient and family to use good hand hygiene technique  - Identify and instruct in appropriate isolation precautions for identified infection/condition  Outcome: Progressing     Problem: SAFETY ADULT  Goal: Patient will remain free of falls  Description: INTERVENTIONS:  - Educate patient/family on patient safety including physical limitations  - Instruct patient to call for assistance with activity   - Consult OT/PT to assist with strengthening/mobility   - Keep Call bell within reach  - Keep bed low and locked with side rails adjusted as appropriate  - Keep care items and personal belongings within reach  - Initiate and maintain comfort rounds  - Make Fall Risk Sign visible to staff  - Offer Toileting every 2 Hours, in advance of need  - Initiate/Maintain bed alarm  - Obtain necessary fall risk management equipment: walker  - Apply yellow socks and bracelet for high fall risk patients  - Consider moving patient to room near nurses station  Outcome: Progressing  Goal: Maintain or return to baseline ADL function  Description: INTERVENTIONS:  -  Assess patient's ability to carry out ADLs; assess patient's baseline for ADL function and identify physical deficits which impact ability to perform ADLs (bathing, care of mouth/teeth, toileting, grooming, dressing, etc )  - Assess/evaluate cause of self-care deficits   - Assess range of motion  - Assess patient's mobility; develop plan if impaired  - Assess patient's need for assistive devices and provide as appropriate  - Encourage maximum independence but intervene and supervise when necessary  - Involve family in performance of ADLs  - Assess for home care needs following discharge   - Consider OT consult to assist with ADL evaluation and planning for discharge  - Provide patient education as appropriate  Outcome: Progressing  Goal: Maintains/Returns to pre admission functional level  Description: INTERVENTIONS:  - Perform BMAT or MOVE assessment daily    - Set and communicate daily mobility goal to care team and patient/family/caregiver  - Collaborate with rehabilitation services on mobility goals if consulted  - Perform Range of Motion 4-6 times a day  - Reposition patient every 2 hours    - Dangle patient 3-4 times a day  - Stand patient 3 times a day  - Ambulate patient 3-4 times a day  - Out of bed for toileting  - Record patient progress and toleration of activity level   Outcome: Progressing     Problem: DISCHARGE PLANNING  Goal: Discharge to home or other facility with appropriate resources  Description: INTERVENTIONS:  - Identify barriers to discharge w/patient and caregiver  - Arrange for needed discharge resources and transportation as appropriate  - Identify discharge learning needs (meds, wound care, etc )  - Arrange for interpretive services to assist at discharge as needed  - Refer to Case Management Department for coordinating discharge planning if the patient needs post-hospital services based on physician/advanced practitioner order or complex needs related to functional status, cognitive ability, or social support system  Outcome: Progressing     Problem: Knowledge Deficit  Goal: Patient/family/caregiver demonstrates understanding of disease process, treatment plan, medications, and discharge instructions  Description: Complete learning assessment and assess knowledge base    Interventions:  - Provide teaching at level of understanding  - Provide teaching via preferred learning methods  Outcome: Progressing     Problem: Potential for Falls  Goal: Patient will remain free of falls  Description: INTERVENTIONS:  - Educate patient/family on patient safety including physical limitations  - Instruct patient to call for assistance with activity   - Consult OT/PT to assist with strengthening/mobility   - Keep Call bell within reach  - Keep bed low and locked with side rails adjusted as appropriate  - Keep care items and personal belongings within reach  - Initiate and maintain comfort rounds  - Make Fall Risk Sign visible to staff  - Offer Toileting every 2 Hours, in advance of need  - Initiate/Maintain bed alarm  - Obtain necessary fall risk management equipment: walker  - Apply yellow socks and bracelet for high fall risk patients  - Consider moving patient to room near nurses station  Outcome: Progressing     Problem: MOBILITY - ADULT  Goal: Maintain or return to baseline ADL function  Description: INTERVENTIONS:  -  Assess patient's ability to carry out ADLs; assess patient's baseline for ADL function and identify physical deficits which impact ability to perform ADLs (bathing, care of mouth/teeth, toileting, grooming, dressing, etc )  - Assess/evaluate cause of self-care deficits   - Assess range of motion  - Assess patient's mobility; develop plan if impaired  - Assess patient's need for assistive devices and provide as appropriate  - Encourage maximum independence but intervene and supervise when necessary  - Involve family in performance of ADLs  - Assess for home care needs following discharge   - Consider OT consult to assist with ADL evaluation and planning for discharge  - Provide patient education as appropriate  Outcome: Progressing  Goal: Maintains/Returns to pre admission functional level  Description: INTERVENTIONS:  - Perform BMAT or MOVE assessment daily    - Set and communicate daily mobility goal to care team and patient/family/caregiver  - Collaborate with rehabilitation services on mobility goals if consulted  - Perform Range of Motion 4-6 times a day  - Reposition patient every 2 hours  - Dangle patient 3-4 times a day  - Stand patient 3 times a day  - Ambulate patient 3-4 times a day  - Out of bed for toileting  - Record patient progress and toleration of activity level   Outcome: Progressing     Problem: Prexisting or High Potential for Compromised Skin Integrity  Goal: Skin integrity is maintained or improved  Description: INTERVENTIONS:  - Identify patients at risk for skin breakdown  - Assess and monitor skin integrity  - Assess and monitor nutrition and hydration status  - Monitor labs   - Assess for incontinence   - Turn and reposition patient  - Assist with mobility/ambulation  - Relieve pressure over bony prominences  - Avoid friction and shearing  - Provide appropriate hygiene as needed including keeping skin clean and dry  - Evaluate need for skin moisturizer/barrier cream  - Collaborate with interdisciplinary team   - Patient/family teaching  - Consider wound care consult   Outcome: Progressing     Problem: Nutrition/Hydration-ADULT  Goal: Nutrient/Hydration intake appropriate for improving, restoring or maintaining nutritional needs  Description: Monitor and assess patient's nutrition/hydration status for malnutrition   Collaborate with interdisciplinary team and initiate plan and interventions as ordered  Monitor patient's weight and dietary intake as ordered or per policy  Utilize nutrition screening tool and intervene as necessary  Determine patient's food preferences and provide high-protein, high-caloric foods as appropriate  INTERVENTIONS:  - Monitor oral intake, urinary output, labs, and treatment plans  - Assess nutrition and hydration status and recommend course of action  - Evaluate amount of meals eaten  - Assist patient with eating if necessary   - Allow adequate time for meals  - Recommend/ encourage appropriate diets, oral nutritional supplements, and vitamin/mineral supplements  - Order, calculate, and assess calorie counts as needed  - Recommend, monitor, and adjust tube feedings and TPN/PPN based on assessed needs  - Assess need for intravenous fluids  - Provide specific nutrition/hydration education as appropriate  - Include patient/family/caregiver in decisions related to nutrition  Outcome: Progressing     Problem: NEUROSENSORY - ADULT  Goal: Achieves maximal functionality and self care  Description: INTERVENTIONS  - Monitor swallowing and airway patency with patient fatigue and changes in neurological status  - Encourage and assist patient to increase activity and self care     - Encourage visually impaired, hearing impaired and aphasic patients to use assistive/communication devices  Outcome: Progressing  Goal: Achieves stable or improved neurological status  Description: INTERVENTIONS  - Monitor and report changes in neurological status  - Monitor vital signs such as temperature, blood pressure, glucose, and any other labs ordered   - Initiate measures to prevent increased intracranial pressure  - Monitor for seizure activity and implement precautions if appropriate      Outcome: Progressing     Problem: CARDIOVASCULAR - ADULT  Goal: Maintains optimal cardiac output and hemodynamic stability  Description: INTERVENTIONS:  - Monitor I/O, vital signs and rhythm  - Monitor for S/S and trends of decreased cardiac output  - Administer and titrate ordered vasoactive medications to optimize hemodynamic stability  - Assess quality of pulses, skin color and temperature  - Assess for signs of decreased coronary artery perfusion  - Instruct patient to report change in severity of symptoms  Outcome: Progressing     Problem: RESPIRATORY - ADULT  Goal: Achieves optimal ventilation and oxygenation  Description: INTERVENTIONS:  - Assess for changes in respiratory status  - Assess for changes in mentation and behavior  - Position to facilitate oxygenation and minimize respiratory effort  - Oxygen administered by appropriate delivery if ordered  - Initiate smoking cessation education as indicated  - Encourage broncho-pulmonary hygiene including cough, deep breathe, Incentive Spirometry  - Assess the need for suctioning and aspirate as needed  - Assess and instruct to report SOB or any respiratory difficulty  - Respiratory Therapy support as indicated  Outcome: Progressing     Problem: GASTROINTESTINAL - ADULT  Goal: Maintains adequate nutritional intake  Description: INTERVENTIONS:  - Monitor percentage of each meal consumed  - Identify factors contributing to decreased intake, treat as appropriate  - Assist with meals as needed  - Monitor I&O, weight, and lab values if indicated  - Obtain nutrition services referral as needed  Outcome: Progressing     Problem: METABOLIC, FLUID AND ELECTROLYTES - ADULT  Goal: Electrolytes maintained within normal limits  Description: INTERVENTIONS:  - Monitor labs and assess patient for signs and symptoms of electrolyte imbalances  - Administer electrolyte replacement as ordered  - Monitor response to electrolyte replacements, including repeat lab results as appropriate  - Instruct patient on fluid and nutrition as appropriate  Outcome: Progressing  Goal: Fluid balance maintained  Description: INTERVENTIONS:  - Monitor labs   - Monitor I/O and WT  - Instruct patient on fluid and nutrition as appropriate  - Assess for signs & symptoms of volume excess or deficit  Outcome: Progressing     Problem: SKIN/TISSUE INTEGRITY - ADULT  Goal: Skin Integrity remains intact(Skin Breakdown Prevention)  Description: Assess:  -Perform Erickson assessment every shift  -Clean and moisturize skin every day  -Inspect skin when repositioning, toileting, and assisting with ADLS  -Assess extremities for adequate circulation and sensation     Bed Management:  -Have minimal linens on bed & keep smooth, unwrinkled  -Change linens as needed when moist or perspiring  -Avoid sitting or lying in one position for more than 2 hours while in bed  -Keep HOB at 45 degrees     Toileting:  -Offer bedside commode  -Assess for incontinence every 2 hours  -Use incontinent care products after each incontinent episode such as brief    Activity:  -Mobilize patient 3-4 times a day  -Encourage activity and walks on unit  -Encourage or provide ROM exercises   -Turn and reposition patient every 2 Hours  -Use appropriate equipment to lift or move patient in bed  -Instruct/ Assist with weight shifting every 2 when out of bed in chair  -Consider limitation of chair time 2 hour intervals    Skin Care:  -Avoid use of baby powder, tape, friction and shearing, hot water or constrictive clothing  -Relieve pressure over bony prominences using pillows  -Do not massage red bony areas    Next Steps:  -Teach patient strategies to minimize risks such as walker  -Consider consults to  interdisciplinary teams such as wound  Outcome: Progressing  Goal: Incision(s), wounds(s) or drain site(s) healing without S/S of infection  Description: INTERVENTIONS  - Assess and document dressing, incision, wound bed, drain sites and surrounding tissue  - Provide patient and family education  - Perform skin care/dressing changes every PRN  Outcome: Progressing     Problem: MUSCULOSKELETAL - ADULT  Goal: Maintain or return mobility to safest level of function  Description: INTERVENTIONS:  - Assess patient's ability to carry out ADLs; assess patient's baseline for ADL function and identify physical deficits which impact ability to perform ADLs (bathing, care of mouth/teeth, toileting, grooming, dressing, etc )  - Assess/evaluate cause of self-care deficits   - Assess range of motion  - Assess patient's mobility  - Assess patient's need for assistive devices and provide as appropriate  - Encourage maximum independence but intervene and supervise when necessary  - Involve family in performance of ADLs  - Assess for home care needs following discharge   - Consider OT consult to assist with ADL evaluation and planning for discharge  - Provide patient education as appropriate  Outcome: Progressing

## 2022-10-14 NOTE — NURSING NOTE
Patient returned from MRI in no acute distress  Patient sleeping in bed at time  Will continue to monitor for duration of shift      Emma Sanabria 10/14/2022 3:36 PM

## 2022-10-14 NOTE — OCCUPATIONAL THERAPY NOTE
Occupational Therapy     Patient Name: Vidhya Pettit  Today's Date: 10/14/2022       10/14/22 1300   Note Type   Note type Cancelled Session   Cancel Reasons Patient off floor/test   Additional Comments Attempted to see pt for OT tx today with pt @ [facility] for MRI  Will continue to monitor       Tonya Christy

## 2022-10-14 NOTE — PROGRESS NOTES
Pt completed MRI with no difficulty  Resting in recovery, vital signs stable  Will continue to monitor

## 2022-10-14 NOTE — PHYSICAL THERAPY NOTE
PHYSICAL THERAPY NOTE          Patient Name: Belkis Harrell  [id number] Date: 10/14/2022       10/14/22 1325   PT Last Visit   PT Visit Date 10/14/22   Note Type   Note Type Cancelled Session   Cancel Reasons   (Pt off campus to [facility] for testing   Will continue to follow as appropriate )   Mauricio Butts

## 2022-10-15 LAB — VIT B1 BLD-SCNC: 117.8 NMOL/L (ref 66.5–200)

## 2022-10-15 RX ADMIN — ALPRAZOLAM 0.5 MG: 0.5 TABLET ORAL at 19:48

## 2022-10-15 RX ADMIN — HEPARIN SODIUM 5000 UNITS: 5000 INJECTION INTRAVENOUS; SUBCUTANEOUS at 05:37

## 2022-10-15 RX ADMIN — Medication 250 MG: at 08:12

## 2022-10-15 RX ADMIN — GABAPENTIN 300 MG: 300 CAPSULE ORAL at 17:01

## 2022-10-15 RX ADMIN — HEPARIN SODIUM 5000 UNITS: 5000 INJECTION INTRAVENOUS; SUBCUTANEOUS at 22:06

## 2022-10-15 RX ADMIN — METOPROLOL TARTRATE 25 MG: 25 TABLET, FILM COATED ORAL at 22:01

## 2022-10-15 RX ADMIN — HYDROMORPHONE HYDROCHLORIDE 0.5 MG: 1 INJECTION, SOLUTION INTRAMUSCULAR; INTRAVENOUS; SUBCUTANEOUS at 11:02

## 2022-10-15 RX ADMIN — QUETIAPINE FUMARATE 50 MG: 25 TABLET ORAL at 08:13

## 2022-10-15 RX ADMIN — GABAPENTIN 300 MG: 300 CAPSULE ORAL at 08:12

## 2022-10-15 RX ADMIN — OLANZAPINE 5 MG: 5 TABLET, ORALLY DISINTEGRATING ORAL at 04:20

## 2022-10-15 RX ADMIN — LOPERAMIDE HCL 2 MG: 1 SOLUTION ORAL at 03:53

## 2022-10-15 RX ADMIN — METOPROLOL TARTRATE 25 MG: 25 TABLET, FILM COATED ORAL at 08:13

## 2022-10-15 RX ADMIN — HEPARIN SODIUM 5000 UNITS: 5000 INJECTION INTRAVENOUS; SUBCUTANEOUS at 14:06

## 2022-10-15 RX ADMIN — Medication 250 MG: at 17:02

## 2022-10-15 RX ADMIN — AMLODIPINE BESYLATE 10 MG: 10 TABLET ORAL at 08:12

## 2022-10-15 RX ADMIN — MICONAZOLE NITRATE: 20 CREAM TOPICAL at 17:02

## 2022-10-15 RX ADMIN — CEFAZOLIN SODIUM 2000 MG: 2 SOLUTION INTRAVENOUS at 04:20

## 2022-10-15 RX ADMIN — GABAPENTIN 300 MG: 300 CAPSULE ORAL at 22:01

## 2022-10-15 RX ADMIN — CEFAZOLIN SODIUM 2000 MG: 2 SOLUTION INTRAVENOUS at 14:06

## 2022-10-15 RX ADMIN — CEFAZOLIN SODIUM 2000 MG: 2 SOLUTION INTRAVENOUS at 22:06

## 2022-10-15 RX ADMIN — QUETIAPINE FUMARATE 50 MG: 25 TABLET ORAL at 17:02

## 2022-10-15 RX ADMIN — MICONAZOLE NITRATE: 20 CREAM TOPICAL at 08:13

## 2022-10-15 RX ADMIN — OLANZAPINE 5 MG: 5 TABLET, ORALLY DISINTEGRATING ORAL at 17:02

## 2022-10-15 NOTE — PROGRESS NOTES
Progress Note - Jayde Montano 45 y o  female MRN: [id number]    Unit/Bed#: [place] 2 -01 Encounter: [id number]      Subjective: The patient is awake and more interactive  She says that she feels pretty well  She denies any chest pain, shortness a breath, abdominal pain, nausea, vomiting  Her appetite has been marginal     Physical Exam:   Temp:  [96 7 °F (35 9 °C)-98 3 °F (36 8 °C)] 96 7 °F (35 9 °C)  HR:  [] 97  Resp:  [16-20] 16  BP: (110-145)/(68-89) 110/89    Gen:  Well-developed, frail, in no distress  Neck:  Supple  No lymphadenopathy, goiter, or bruit  Heart:  Regular rhythm  I heard no murmur, gallop, or rub  Lungs:  Clear to auscultation and percussion  No wheezing, rales, or rhonchi  Abd:  Soft with active bowel sounds  No mass, tenderness, or organomegaly  Extremities:  No clubbing, cyanosis, or edema  No calf tenderness  Neuro:  Awake and interactive  Still somewhat confused  Moves all limbs  Skin:  Warm and dry  LABS:  B1 level was normal    MRI showed no acute infarct but did show severe periventricular in white matter T2 hyperintensities      Assessment/Plan:  1  Encephalopathy secondary to multiple metabolic derangements at the time of admission  2  MSSA bacteremia  3  History of depression  4  Lumbar radiculopathy  5  Rhabdomyolysis, resolved  6  Acute renal failure resolved  7  Mild hypercalcemia  8  Chronic narcotic therapy  9  Tobacco abuse    The patient has been improving over the last several days  Her MRI findings with offer an explanation of the prolonged course that she has experienced  Hopefully, she will continue to improve  She remains on cefazolin for MSSA bacteremia  She is on her usual antidepressants  Hopefully, she will be considered an appropriate candidate for acute rehab  I reviewed the MRI findings and my assessment of the prognosis with the patient's       VTE Pharmacologic Prophylaxis: Heparin  VTE Mechanical Prophylaxis: sequential compression device

## 2022-10-15 NOTE — PLAN OF CARE
Problem: PAIN - ADULT  Goal: Verbalizes/displays adequate comfort level or baseline comfort level  Description: Interventions:  - Encourage patient to monitor pain and request assistance  - Assess pain using appropriate pain scale  - Administer analgesics based on type and severity of pain and evaluate response  - Implement non-pharmacological measures as appropriate and evaluate response  - Consider cultural and social influences on pain and pain management  - Notify physician/advanced practitioner if interventions unsuccessful or patient reports new pain  Outcome: Progressing     Problem: INFECTION - ADULT  Goal: Absence or prevention of progression during hospitalization  Description: INTERVENTIONS:  - Assess and monitor for signs and symptoms of infection  - Monitor lab/diagnostic results  - Monitor all insertion sites, i e  indwelling lines, tubes, and drains  - Monitor endotracheal if appropriate and nasal secretions for changes in amount and color  - [place] appropriate cooling/warming therapies per order  - Administer medications as ordered  - Instruct and encourage patient and family to use good hand hygiene technique  - Identify and instruct in appropriate isolation precautions for identified infection/condition  Outcome: Progressing     Problem: SAFETY ADULT  Goal: Patient will remain free of falls  Description: INTERVENTIONS:  - Educate patient/family on patient safety including physical limitations  - Instruct patient to call for assistance with activity   - Consult OT/PT to assist with strengthening/mobility   - Keep Call bell within reach  - Keep bed low and locked with side rails adjusted as appropriate  - Keep care items and personal belongings within reach  - Initiate and maintain comfort rounds  - Make Fall Risk Sign visible to staff  - Offer Toileting every 2 Hours, in advance of need  - Initiate/Maintain bed alarm  - Obtain necessary fall risk management equipment: walker  - Apply yellow socks and bracelet for high fall risk patients  - Consider moving patient to room near nurses station  Outcome: Progressing  Goal: Maintain or return to baseline ADL function  Description: INTERVENTIONS:  -  Assess patient's ability to carry out ADLs; assess patient's baseline for ADL function and identify physical deficits which impact ability to perform ADLs (bathing, care of mouth/teeth, toileting, grooming, dressing, etc )  - Assess/evaluate cause of self-care deficits   - Assess range of motion  - Assess patient's mobility; develop plan if impaired  - Assess patient's need for assistive devices and provide as appropriate  - Encourage maximum independence but intervene and supervise when necessary  - Involve family in performance of ADLs  - Assess for home care needs following discharge   - Consider OT consult to assist with ADL evaluation and planning for discharge  - Provide patient education as appropriate  Outcome: Progressing  Goal: Maintains/Returns to pre admission functional level  Description: INTERVENTIONS:  - Perform BMAT or MOVE assessment daily    - Set and communicate daily mobility goal to care team and patient/family/caregiver  - Collaborate with rehabilitation services on mobility goals if consulted  - Perform Range of Motion 4-6 times a day  - Reposition patient every 2 hours    - Dangle patient 3-4 times a day  - Stand patient 3 times a day  - Ambulate patient 3-4 times a day  - Out of bed for toileting  - Record patient progress and toleration of activity level   Outcome: Progressing     Problem: DISCHARGE PLANNING  Goal: Discharge to home or other facility with appropriate resources  Description: INTERVENTIONS:  - Identify barriers to discharge w/patient and caregiver  - Arrange for needed discharge resources and transportation as appropriate  - Identify discharge learning needs (meds, wound care, etc )  - Arrange for interpretive services to assist at discharge as needed  - Refer to Case Management Department for coordinating discharge planning if the patient needs post-hospital services based on physician/advanced practitioner order or complex needs related to functional status, cognitive ability, or social support system  Outcome: Progressing     Problem: Knowledge Deficit  Goal: Patient/family/caregiver demonstrates understanding of disease process, treatment plan, medications, and discharge instructions  Description: Complete learning assessment and assess knowledge base    Interventions:  - Provide teaching at level of understanding  - Provide teaching via preferred learning methods  Outcome: Progressing     Problem: Potential for Falls  Goal: Patient will remain free of falls  Description: INTERVENTIONS:  - Educate patient/family on patient safety including physical limitations  - Instruct patient to call for assistance with activity   - Consult OT/PT to assist with strengthening/mobility   - Keep Call bell within reach  - Keep bed low and locked with side rails adjusted as appropriate  - Keep care items and personal belongings within reach  - Initiate and maintain comfort rounds  - Make Fall Risk Sign visible to staff  - Offer Toileting every 2 Hours, in advance of need  - Initiate/Maintain bed alarm  - Obtain necessary fall risk management equipment: walker  - Apply yellow socks and bracelet for high fall risk patients  - Consider moving patient to room near nurses station  Outcome: Progressing     Problem: MOBILITY - ADULT  Goal: Maintain or return to baseline ADL function  Description: INTERVENTIONS:  -  Assess patient's ability to carry out ADLs; assess patient's baseline for ADL function and identify physical deficits which impact ability to perform ADLs (bathing, care of mouth/teeth, toileting, grooming, dressing, etc )  - Assess/evaluate cause of self-care deficits   - Assess range of motion  - Assess patient's mobility; develop plan if impaired  - Assess patient's need for assistive devices and provide as appropriate  - Encourage maximum independence but intervene and supervise when necessary  - Involve family in performance of ADLs  - Assess for home care needs following discharge   - Consider OT consult to assist with ADL evaluation and planning for discharge  - Provide patient education as appropriate  Outcome: Progressing  Goal: Maintains/Returns to pre admission functional level  Description: INTERVENTIONS:  - Perform BMAT or MOVE assessment daily    - Set and communicate daily mobility goal to care team and patient/family/caregiver  - Collaborate with rehabilitation services on mobility goals if consulted  - Perform Range of Motion 4-6 times a day  - Reposition patient every 2 hours  - Dangle patient 3-4 times a day  - Stand patient 3 times a day  - Ambulate patient 3-4 times a day  - Out of bed for toileting  - Record patient progress and toleration of activity level   Outcome: Progressing     Problem: Prexisting or High Potential for Compromised Skin Integrity  Goal: Skin integrity is maintained or improved  Description: INTERVENTIONS:  - Identify patients at risk for skin breakdown  - Assess and monitor skin integrity  - Assess and monitor nutrition and hydration status  - Monitor labs   - Assess for incontinence   - Turn and reposition patient  - Assist with mobility/ambulation  - Relieve pressure over bony prominences  - Avoid friction and shearing  - Provide appropriate hygiene as needed including keeping skin clean and dry  - Evaluate need for skin moisturizer/barrier cream  - Collaborate with interdisciplinary team   - Patient/family teaching  - Consider wound care consult   Outcome: Progressing     Problem: Nutrition/Hydration-ADULT  Goal: Nutrient/Hydration intake appropriate for improving, restoring or maintaining nutritional needs  Description: Monitor and assess patient's nutrition/hydration status for malnutrition   Collaborate with interdisciplinary team and initiate plan and interventions as ordered  Monitor patient's weight and dietary intake as ordered or per policy  Utilize nutrition screening tool and intervene as necessary  Determine patient's food preferences and provide high-protein, high-caloric foods as appropriate  INTERVENTIONS:  - Monitor oral intake, urinary output, labs, and treatment plans  - Assess nutrition and hydration status and recommend course of action  - Evaluate amount of meals eaten  - Assist patient with eating if necessary   - Allow adequate time for meals  - Recommend/ encourage appropriate diets, oral nutritional supplements, and vitamin/mineral supplements  - Order, calculate, and assess calorie counts as needed  - Recommend, monitor, and adjust tube feedings and TPN/PPN based on assessed needs  - Assess need for intravenous fluids  - Provide specific nutrition/hydration education as appropriate  - Include patient/family/caregiver in decisions related to nutrition  Outcome: Progressing     Problem: NEUROSENSORY - ADULT  Goal: Achieves maximal functionality and self care  Description: INTERVENTIONS  - Monitor swallowing and airway patency with patient fatigue and changes in neurological status  - Encourage and assist patient to increase activity and self care     - Encourage visually impaired, hearing impaired and aphasic patients to use assistive/communication devices  Outcome: Progressing  Goal: Achieves stable or improved neurological status  Description: INTERVENTIONS  - Monitor and report changes in neurological status  - Monitor vital signs such as temperature, blood pressure, glucose, and any other labs ordered   - Initiate measures to prevent increased intracranial pressure  - Monitor for seizure activity and implement precautions if appropriate      Outcome: Progressing     Problem: CARDIOVASCULAR - ADULT  Goal: Maintains optimal cardiac output and hemodynamic stability  Description: INTERVENTIONS:  - Monitor I/O, vital signs and rhythm  - Monitor for S/S and trends of decreased cardiac output  - Administer and titrate ordered vasoactive medications to optimize hemodynamic stability  - Assess quality of pulses, skin color and temperature  - Assess for signs of decreased coronary artery perfusion  - Instruct patient to report change in severity of symptoms  Outcome: Progressing     Problem: RESPIRATORY - ADULT  Goal: Achieves optimal ventilation and oxygenation  Description: INTERVENTIONS:  - Assess for changes in respiratory status  - Assess for changes in mentation and behavior  - Position to facilitate oxygenation and minimize respiratory effort  - Oxygen administered by appropriate delivery if ordered  - Initiate smoking cessation education as indicated  - Encourage broncho-pulmonary hygiene including cough, deep breathe, Incentive Spirometry  - Assess the need for suctioning and aspirate as needed  - Assess and instruct to report SOB or any respiratory difficulty  - Respiratory Therapy support as indicated  Outcome: Progressing     Problem: GASTROINTESTINAL - ADULT  Goal: Maintains adequate nutritional intake  Description: INTERVENTIONS:  - Monitor percentage of each meal consumed  - Identify factors contributing to decreased intake, treat as appropriate  - Assist with meals as needed  - Monitor I&O, weight, and lab values if indicated  - Obtain nutrition services referral as needed  Outcome: Progressing     Problem: METABOLIC, FLUID AND ELECTROLYTES - ADULT  Goal: Electrolytes maintained within normal limits  Description: INTERVENTIONS:  - Monitor labs and assess patient for signs and symptoms of electrolyte imbalances  - Administer electrolyte replacement as ordered  - Monitor response to electrolyte replacements, including repeat lab results as appropriate  - Instruct patient on fluid and nutrition as appropriate  Outcome: Progressing  Goal: Fluid balance maintained  Description: INTERVENTIONS:  - Monitor labs   - Monitor I/O and WT  - Instruct patient on fluid and nutrition as appropriate  - Assess for signs & symptoms of volume excess or deficit  Outcome: Progressing     Problem: SKIN/TISSUE INTEGRITY - ADULT  Goal: Skin Integrity remains intact(Skin Breakdown Prevention)  Description: Assess:  -Perform Erickson assessment every shift  -Clean and moisturize skin every day  -Inspect skin when repositioning, toileting, and assisting with ADLS  -Assess extremities for adequate circulation and sensation     Bed Management:  -Have minimal linens on bed & keep smooth, unwrinkled  -Change linens as needed when moist or perspiring  -Avoid sitting or lying in one position for more than 2 hours while in bed  -Keep HOB at 45 degrees     Toileting:  -Offer bedside commode  -Assess for incontinence every 2 hours  -Use incontinent care products after each incontinent episode such as brief    Activity:  -Mobilize patient 3-4 times a day  -Encourage activity and walks on unit  -Encourage or provide ROM exercises   -Turn and reposition patient every 2 Hours  -Use appropriate equipment to lift or move patient in bed  -Instruct/ Assist with weight shifting every 2 when out of bed in chair  -Consider limitation of chair time 2 hour intervals    Skin Care:  -Avoid use of baby powder, tape, friction and shearing, hot water or constrictive clothing  -Relieve pressure over bony prominences using pillows  -Do not massage red bony areas    Next Steps:  -Teach patient strategies to minimize risks such as walker  -Consider consults to  interdisciplinary teams such as wound  Outcome: Progressing  Goal: Incision(s), wounds(s) or drain site(s) healing without S/S of infection  Description: INTERVENTIONS  - Assess and document dressing, incision, wound bed, drain sites and surrounding tissue  - Provide patient and family education  - Perform skin care/dressing changes every PRN  Outcome: Progressing     Problem: MUSCULOSKELETAL - ADULT  Goal: Maintain or return mobility to safest level of function  Description: INTERVENTIONS:  - Assess patient's ability to carry out ADLs; assess patient's baseline for ADL function and identify physical deficits which impact ability to perform ADLs (bathing, care of mouth/teeth, toileting, grooming, dressing, etc )  - Assess/evaluate cause of self-care deficits   - Assess range of motion  - Assess patient's mobility  - Assess patient's need for assistive devices and provide as appropriate  - Encourage maximum independence but intervene and supervise when necessary  - Involve family in performance of ADLs  - Assess for home care needs following discharge   - Consider OT consult to assist with ADL evaluation and planning for discharge  - Provide patient education as appropriate  Outcome: Progressing

## 2022-10-15 NOTE — NURSING NOTE
Patient having R foot pain/limbing  SLIM made aware  No acute injuries noted on external portion of foot/ankle on R side  Will continue to monitor      Emma Weiss 10/15/2022 4:22 PM

## 2022-10-15 NOTE — PLAN OF CARE
Problem: PAIN - ADULT  Goal: Verbalizes/displays adequate comfort level or baseline comfort level  Description: Interventions:  - Encourage patient to monitor pain and request assistance  - Assess pain using appropriate pain scale  - Administer analgesics based on type and severity of pain and evaluate response  - Implement non-pharmacological measures as appropriate and evaluate response  - Consider cultural and social influences on pain and pain management  - Notify physician/advanced practitioner if interventions unsuccessful or patient reports new pain  Outcome: Progressing     Problem: INFECTION - ADULT  Goal: Absence or prevention of progression during hospitalization  Description: INTERVENTIONS:  - Assess and monitor for signs and symptoms of infection  - Monitor lab/diagnostic results  - Monitor all insertion sites, i e  indwelling lines, tubes, and drains  - Monitor endotracheal if appropriate and nasal secretions for changes in amount and color  - Raven appropriate cooling/warming therapies per order  - Administer medications as ordered  - Instruct and encourage patient and family to use good hand hygiene technique  - Identify and instruct in appropriate isolation precautions for identified infection/condition  Outcome: Progressing     Problem: SAFETY ADULT  Goal: Patient will remain free of falls  Description: INTERVENTIONS:  - Educate patient/family on patient safety including physical limitations  - Instruct patient to call for assistance with activity   - Consult OT/PT to assist with strengthening/mobility   - Keep Call bell within reach  - Keep bed low and locked with side rails adjusted as appropriate  - Keep care items and personal belongings within reach  - Initiate and maintain comfort rounds  - Make Fall Risk Sign visible to staff  - Offer Toileting every 2 Hours, in advance of need  - Initiate/Maintain bed alarm  - Obtain necessary fall risk management equipment: walker  - Apply yellow socks and bracelet for high fall risk patients  - Consider moving patient to room near nurses station  Outcome: Progressing  Goal: Maintain or return to baseline ADL function  Description: INTERVENTIONS:  -  Assess patient's ability to carry out ADLs; assess patient's baseline for ADL function and identify physical deficits which impact ability to perform ADLs (bathing, care of mouth/teeth, toileting, grooming, dressing, etc )  - Assess/evaluate cause of self-care deficits   - Assess range of motion  - Assess patient's mobility; develop plan if impaired  - Assess patient's need for assistive devices and provide as appropriate  - Encourage maximum independence but intervene and supervise when necessary  - Involve family in performance of ADLs  - Assess for home care needs following discharge   - Consider OT consult to assist with ADL evaluation and planning for discharge  - Provide patient education as appropriate  Outcome: Progressing  Goal: Maintains/Returns to pre admission functional level  Description: INTERVENTIONS:  - Perform BMAT or MOVE assessment daily    - Set and communicate daily mobility goal to care team and patient/family/caregiver  - Collaborate with rehabilitation services on mobility goals if consulted  - Perform Range of Motion 4-6 times a day  - Reposition patient every 2 hours    - Dangle patient 3-4 times a day  - Stand patient 3 times a day  - Ambulate patient 3-4 times a day  - Out of bed for toileting  - Record patient progress and toleration of activity level   Outcome: Progressing     Problem: DISCHARGE PLANNING  Goal: Discharge to home or other facility with appropriate resources  Description: INTERVENTIONS:  - Identify barriers to discharge w/patient and caregiver  - Arrange for needed discharge resources and transportation as appropriate  - Identify discharge learning needs (meds, wound care, etc )  - Arrange for interpretive services to assist at discharge as needed  - Refer to Case Management Department for coordinating discharge planning if the patient needs post-hospital services based on physician/advanced practitioner order or complex needs related to functional status, cognitive ability, or social support system  Outcome: Progressing     Problem: Knowledge Deficit  Goal: Patient/family/caregiver demonstrates understanding of disease process, treatment plan, medications, and discharge instructions  Description: Complete learning assessment and assess knowledge base    Interventions:  - Provide teaching at level of understanding  - Provide teaching via preferred learning methods  Outcome: Progressing     Problem: Potential for Falls  Goal: Patient will remain free of falls  Description: INTERVENTIONS:  - Educate patient/family on patient safety including physical limitations  - Instruct patient to call for assistance with activity   - Consult OT/PT to assist with strengthening/mobility   - Keep Call bell within reach  - Keep bed low and locked with side rails adjusted as appropriate  - Keep care items and personal belongings within reach  - Initiate and maintain comfort rounds  - Make Fall Risk Sign visible to staff  - Offer Toileting every 2 Hours, in advance of need  - Initiate/Maintain bed alarm  - Obtain necessary fall risk management equipment: walker  - Apply yellow socks and bracelet for high fall risk patients  - Consider moving patient to room near nurses station  Outcome: Progressing     Problem: MOBILITY - ADULT  Goal: Maintain or return to baseline ADL function  Description: INTERVENTIONS:  -  Assess patient's ability to carry out ADLs; assess patient's baseline for ADL function and identify physical deficits which impact ability to perform ADLs (bathing, care of mouth/teeth, toileting, grooming, dressing, etc )  - Assess/evaluate cause of self-care deficits   - Assess range of motion  - Assess patient's mobility; develop plan if impaired  - Assess patient's need for assistive devices and provide as appropriate  - Encourage maximum independence but intervene and supervise when necessary  - Involve family in performance of ADLs  - Assess for home care needs following discharge   - Consider OT consult to assist with ADL evaluation and planning for discharge  - Provide patient education as appropriate  Outcome: Progressing  Goal: Maintains/Returns to pre admission functional level  Description: INTERVENTIONS:  - Perform BMAT or MOVE assessment daily    - Set and communicate daily mobility goal to care team and patient/family/caregiver  - Collaborate with rehabilitation services on mobility goals if consulted  - Perform Range of Motion 4-6 times a day  - Reposition patient every 2 hours  - Dangle patient 3-4 times a day  - Stand patient 3 times a day  - Ambulate patient 3-4 times a day  - Out of bed for toileting  - Record patient progress and toleration of activity level   Outcome: Progressing     Problem: Prexisting or High Potential for Compromised Skin Integrity  Goal: Skin integrity is maintained or improved  Description: INTERVENTIONS:  - Identify patients at risk for skin breakdown  - Assess and monitor skin integrity  - Assess and monitor nutrition and hydration status  - Monitor labs   - Assess for incontinence   - Turn and reposition patient  - Assist with mobility/ambulation  - Relieve pressure over bony prominences  - Avoid friction and shearing  - Provide appropriate hygiene as needed including keeping skin clean and dry  - Evaluate need for skin moisturizer/barrier cream  - Collaborate with interdisciplinary team   - Patient/family teaching  - Consider wound care consult   Outcome: Progressing     Problem: Nutrition/Hydration-ADULT  Goal: Nutrient/Hydration intake appropriate for improving, restoring or maintaining nutritional needs  Description: Monitor and assess patient's nutrition/hydration status for malnutrition   Collaborate with interdisciplinary team and initiate plan and interventions as ordered  Monitor patient's weight and dietary intake as ordered or per policy  Utilize nutrition screening tool and intervene as necessary  Determine patient's food preferences and provide high-protein, high-caloric foods as appropriate  INTERVENTIONS:  - Monitor oral intake, urinary output, labs, and treatment plans  - Assess nutrition and hydration status and recommend course of action  - Evaluate amount of meals eaten  - Assist patient with eating if necessary   - Allow adequate time for meals  - Recommend/ encourage appropriate diets, oral nutritional supplements, and vitamin/mineral supplements  - Order, calculate, and assess calorie counts as needed  - Recommend, monitor, and adjust tube feedings and TPN/PPN based on assessed needs  - Assess need for intravenous fluids  - Provide specific nutrition/hydration education as appropriate  - Include patient/family/caregiver in decisions related to nutrition  Outcome: Progressing     Problem: NEUROSENSORY - ADULT  Goal: Achieves maximal functionality and self care  Description: INTERVENTIONS  - Monitor swallowing and airway patency with patient fatigue and changes in neurological status  - Encourage and assist patient to increase activity and self care     - Encourage visually impaired, hearing impaired and aphasic patients to use assistive/communication devices  Outcome: Progressing  Goal: Achieves stable or improved neurological status  Description: INTERVENTIONS  - Monitor and report changes in neurological status  - Monitor vital signs such as temperature, blood pressure, glucose, and any other labs ordered   - Initiate measures to prevent increased intracranial pressure  - Monitor for seizure activity and implement precautions if appropriate      Outcome: Progressing     Problem: CARDIOVASCULAR - ADULT  Goal: Maintains optimal cardiac output and hemodynamic stability  Description: INTERVENTIONS:  - Monitor I/O, vital signs and rhythm  - Monitor for S/S and trends of decreased cardiac output  - Administer and titrate ordered vasoactive medications to optimize hemodynamic stability  - Assess quality of pulses, skin color and temperature  - Assess for signs of decreased coronary artery perfusion  - Instruct patient to report change in severity of symptoms  Outcome: Progressing     Problem: RESPIRATORY - ADULT  Goal: Achieves optimal ventilation and oxygenation  Description: INTERVENTIONS:  - Assess for changes in respiratory status  - Assess for changes in mentation and behavior  - Position to facilitate oxygenation and minimize respiratory effort  - Oxygen administered by appropriate delivery if ordered  - Initiate smoking cessation education as indicated  - Encourage broncho-pulmonary hygiene including cough, deep breathe, Incentive Spirometry  - Assess the need for suctioning and aspirate as needed  - Assess and instruct to report SOB or any respiratory difficulty  - Respiratory Therapy support as indicated  Outcome: Progressing     Problem: GASTROINTESTINAL - ADULT  Goal: Maintains adequate nutritional intake  Description: INTERVENTIONS:  - Monitor percentage of each meal consumed  - Identify factors contributing to decreased intake, treat as appropriate  - Assist with meals as needed  - Monitor I&O, weight, and lab values if indicated  - Obtain nutrition services referral as needed  Outcome: Progressing     Problem: METABOLIC, FLUID AND ELECTROLYTES - ADULT  Goal: Electrolytes maintained within normal limits  Description: INTERVENTIONS:  - Monitor labs and assess patient for signs and symptoms of electrolyte imbalances  - Administer electrolyte replacement as ordered  - Monitor response to electrolyte replacements, including repeat lab results as appropriate  - Instruct patient on fluid and nutrition as appropriate  Outcome: Progressing  Goal: Fluid balance maintained  Description: INTERVENTIONS:  - Monitor labs   - Monitor I/O and WT  - Instruct patient on fluid and nutrition as appropriate  - Assess for signs & symptoms of volume excess or deficit  Outcome: Progressing     Problem: SKIN/TISSUE INTEGRITY - ADULT  Goal: Skin Integrity remains intact(Skin Breakdown Prevention)  Description: Assess:  -Perform Erickson assessment every shift  -Clean and moisturize skin every day  -Inspect skin when repositioning, toileting, and assisting with ADLS  -Assess extremities for adequate circulation and sensation     Bed Management:  -Have minimal linens on bed & keep smooth, unwrinkled  -Change linens as needed when moist or perspiring  -Avoid sitting or lying in one position for more than 2 hours while in bed  -Keep HOB at 45 degrees     Toileting:  -Offer bedside commode  -Assess for incontinence every 2 hours  -Use incontinent care products after each incontinent episode such as brief    Activity:  -Mobilize patient 3-4 times a day  -Encourage activity and walks on unit  -Encourage or provide ROM exercises   -Turn and reposition patient every 2 Hours  -Use appropriate equipment to lift or move patient in bed  -Instruct/ Assist with weight shifting every 2 when out of bed in chair  -Consider limitation of chair time 2 hour intervals    Skin Care:  -Avoid use of baby powder, tape, friction and shearing, hot water or constrictive clothing  -Relieve pressure over bony prominences using pillows  -Do not massage red bony areas    Next Steps:  -Teach patient strategies to minimize risks such as walker  -Consider consults to  interdisciplinary teams such as wound  Outcome: Progressing  Goal: Incision(s), wounds(s) or drain site(s) healing without S/S of infection  Description: INTERVENTIONS  - Assess and document dressing, incision, wound bed, drain sites and surrounding tissue  - Provide patient and family education  - Perform skin care/dressing changes every PRN  Outcome: Progressing     Problem: MUSCULOSKELETAL - ADULT  Goal: Maintain or return mobility to safest level of function  Description: INTERVENTIONS:  - Assess patient's ability to carry out ADLs; assess patient's baseline for ADL function and identify physical deficits which impact ability to perform ADLs (bathing, care of mouth/teeth, toileting, grooming, dressing, etc )  - Assess/evaluate cause of self-care deficits   - Assess range of motion  - Assess patient's mobility  - Assess patient's need for assistive devices and provide as appropriate  - Encourage maximum independence but intervene and supervise when necessary  - Involve family in performance of ADLs  - Assess for home care needs following discharge   - Consider OT consult to assist with ADL evaluation and planning for discharge  - Provide patient education as appropriate  Outcome: Progressing

## 2022-10-15 NOTE — NURSING NOTE
Restraints removed and discontinued due to improving patient behavior and 1:1 in room  Will continue to monitor for any changes in mental status      Emma Lynch 10/15/2022 7:56 AM

## 2022-10-16 RX ADMIN — OLANZAPINE 5 MG: 5 TABLET, ORALLY DISINTEGRATING ORAL at 05:29

## 2022-10-16 RX ADMIN — QUETIAPINE FUMARATE 50 MG: 25 TABLET ORAL at 08:08

## 2022-10-16 RX ADMIN — MICONAZOLE NITRATE: 20 CREAM TOPICAL at 08:17

## 2022-10-16 RX ADMIN — MICONAZOLE NITRATE: 20 CREAM TOPICAL at 17:12

## 2022-10-16 RX ADMIN — VENLAFAXINE HYDROCHLORIDE 150 MG: 150 CAPSULE, EXTENDED RELEASE ORAL at 08:07

## 2022-10-16 RX ADMIN — GABAPENTIN 300 MG: 300 CAPSULE ORAL at 22:08

## 2022-10-16 RX ADMIN — CEFAZOLIN SODIUM 2000 MG: 2 SOLUTION INTRAVENOUS at 22:08

## 2022-10-16 RX ADMIN — CEFAZOLIN SODIUM 2000 MG: 2 SOLUTION INTRAVENOUS at 13:15

## 2022-10-16 RX ADMIN — HEPARIN SODIUM 5000 UNITS: 5000 INJECTION INTRAVENOUS; SUBCUTANEOUS at 13:15

## 2022-10-16 RX ADMIN — Medication 250 MG: at 08:08

## 2022-10-16 RX ADMIN — QUETIAPINE FUMARATE 50 MG: 25 TABLET ORAL at 17:09

## 2022-10-16 RX ADMIN — GABAPENTIN 300 MG: 300 CAPSULE ORAL at 08:07

## 2022-10-16 RX ADMIN — GABAPENTIN 300 MG: 300 CAPSULE ORAL at 15:25

## 2022-10-16 RX ADMIN — AMLODIPINE BESYLATE 10 MG: 10 TABLET ORAL at 08:07

## 2022-10-16 RX ADMIN — HEPARIN SODIUM 5000 UNITS: 5000 INJECTION INTRAVENOUS; SUBCUTANEOUS at 22:08

## 2022-10-16 RX ADMIN — METOPROLOL TARTRATE 25 MG: 25 TABLET, FILM COATED ORAL at 08:08

## 2022-10-16 RX ADMIN — Medication 250 MG: at 17:09

## 2022-10-16 RX ADMIN — HEPARIN SODIUM 5000 UNITS: 5000 INJECTION INTRAVENOUS; SUBCUTANEOUS at 05:29

## 2022-10-16 RX ADMIN — CEFAZOLIN SODIUM 2000 MG: 2 SOLUTION INTRAVENOUS at 05:29

## 2022-10-16 RX ADMIN — METOPROLOL TARTRATE 25 MG: 25 TABLET, FILM COATED ORAL at 22:10

## 2022-10-16 RX ADMIN — ALPRAZOLAM 0.5 MG: 0.5 TABLET ORAL at 22:08

## 2022-10-16 RX ADMIN — OLANZAPINE 5 MG: 5 TABLET, ORALLY DISINTEGRATING ORAL at 17:08

## 2022-10-16 RX ADMIN — Medication 1 PATCH: at 08:16

## 2022-10-16 NOTE — PLAN OF CARE
Problem: PAIN - ADULT  Goal: Verbalizes/displays adequate comfort level or baseline comfort level  Description: Interventions:  - Encourage patient to monitor pain and request assistance  - Assess pain using appropriate pain scale  - Administer analgesics based on type and severity of pain and evaluate response  - Implement non-pharmacological measures as appropriate and evaluate response  - Consider cultural and social influences on pain and pain management  - Notify physician/advanced practitioner if interventions unsuccessful or patient reports new pain  Outcome: Progressing     Problem: INFECTION - ADULT  Goal: Absence or prevention of progression during hospitalization  Description: INTERVENTIONS:  - Assess and monitor for signs and symptoms of infection  - Monitor lab/diagnostic results  - Monitor all insertion sites, i e  indwelling lines, tubes, and drains  - Monitor endotracheal if appropriate and nasal secretions for changes in amount and color  - [place] appropriate cooling/warming therapies per order  - Administer medications as ordered  - Instruct and encourage patient and family to use good hand hygiene technique  - Identify and instruct in appropriate isolation precautions for identified infection/condition  Outcome: Progressing     Problem: SAFETY ADULT  Goal: Patient will remain free of falls  Description: INTERVENTIONS:  - Educate patient/family on patient safety including physical limitations  - Instruct patient to call for assistance with activity   - Consult OT/PT to assist with strengthening/mobility   - Keep Call bell within reach  - Keep bed low and locked with side rails adjusted as appropriate  - Keep care items and personal belongings within reach  - Initiate and maintain comfort rounds  - Make Fall Risk Sign visible to staff  - Offer Toileting every 2 Hours, in advance of need  - Initiate/Maintain bed alarm  - Obtain necessary fall risk management equipment: walker  - Apply yellow socks and bracelet for high fall risk patients  - Consider moving patient to room near nurses station  Outcome: Progressing  Goal: Maintain or return to baseline ADL function  Description: INTERVENTIONS:  -  Assess patient's ability to carry out ADLs; assess patient's baseline for ADL function and identify physical deficits which impact ability to perform ADLs (bathing, care of mouth/teeth, toileting, grooming, dressing, etc )  - Assess/evaluate cause of self-care deficits   - Assess range of motion  - Assess patient's mobility; develop plan if impaired  - Assess patient's need for assistive devices and provide as appropriate  - Encourage maximum independence but intervene and supervise when necessary  - Involve family in performance of ADLs  - Assess for home care needs following discharge   - Consider OT consult to assist with ADL evaluation and planning for discharge  - Provide patient education as appropriate  Outcome: Progressing  Goal: Maintains/Returns to pre admission functional level  Description: INTERVENTIONS:  - Perform BMAT or MOVE assessment daily    - Set and communicate daily mobility goal to care team and patient/family/caregiver  - Collaborate with rehabilitation services on mobility goals if consulted  - Perform Range of Motion 4-6 times a day  - Reposition patient every 2 hours    - Dangle patient 3-4 times a day  - Stand patient 3 times a day  - Ambulate patient 3-4 times a day  - Out of bed for toileting  - Record patient progress and toleration of activity level   Outcome: Progressing     Problem: DISCHARGE PLANNING  Goal: Discharge to home or other facility with appropriate resources  Description: INTERVENTIONS:  - Identify barriers to discharge w/patient and caregiver  - Arrange for needed discharge resources and transportation as appropriate  - Identify discharge learning needs (meds, wound care, etc )  - Arrange for interpretive services to assist at discharge as needed  - Refer to Case Management Department for coordinating discharge planning if the patient needs post-hospital services based on physician/advanced practitioner order or complex needs related to functional status, cognitive ability, or social support system  Outcome: Progressing     Problem: Knowledge Deficit  Goal: Patient/family/caregiver demonstrates understanding of disease process, treatment plan, medications, and discharge instructions  Description: Complete learning assessment and assess knowledge base    Interventions:  - Provide teaching at level of understanding  - Provide teaching via preferred learning methods  Outcome: Progressing     Problem: Potential for Falls  Goal: Patient will remain free of falls  Description: INTERVENTIONS:  - Educate patient/family on patient safety including physical limitations  - Instruct patient to call for assistance with activity   - Consult OT/PT to assist with strengthening/mobility   - Keep Call bell within reach  - Keep bed low and locked with side rails adjusted as appropriate  - Keep care items and personal belongings within reach  - Initiate and maintain comfort rounds  - Make Fall Risk Sign visible to staff  - Offer Toileting every 2 Hours, in advance of need  - Initiate/Maintain bed alarm  - Obtain necessary fall risk management equipment: walker  - Apply yellow socks and bracelet for high fall risk patients  - Consider moving patient to room near nurses station  Outcome: Progressing     Problem: MOBILITY - ADULT  Goal: Maintain or return to baseline ADL function  Description: INTERVENTIONS:  -  Assess patient's ability to carry out ADLs; assess patient's baseline for ADL function and identify physical deficits which impact ability to perform ADLs (bathing, care of mouth/teeth, toileting, grooming, dressing, etc )  - Assess/evaluate cause of self-care deficits   - Assess range of motion  - Assess patient's mobility; develop plan if impaired  - Assess patient's need for assistive devices and provide as appropriate  - Encourage maximum independence but intervene and supervise when necessary  - Involve family in performance of ADLs  - Assess for home care needs following discharge   - Consider OT consult to assist with ADL evaluation and planning for discharge  - Provide patient education as appropriate  Outcome: Progressing  Goal: Maintains/Returns to pre admission functional level  Description: INTERVENTIONS:  - Perform BMAT or MOVE assessment daily    - Set and communicate daily mobility goal to care team and patient/family/caregiver  - Collaborate with rehabilitation services on mobility goals if consulted  - Perform Range of Motion 4-6 times a day  - Reposition patient every 2 hours  - Dangle patient 3-4 times a day  - Stand patient 3 times a day  - Ambulate patient 3-4 times a day  - Out of bed for toileting  - Record patient progress and toleration of activity level   Outcome: Progressing     Problem: Prexisting or High Potential for Compromised Skin Integrity  Goal: Skin integrity is maintained or improved  Description: INTERVENTIONS:  - Identify patients at risk for skin breakdown  - Assess and monitor skin integrity  - Assess and monitor nutrition and hydration status  - Monitor labs   - Assess for incontinence   - Turn and reposition patient  - Assist with mobility/ambulation  - Relieve pressure over bony prominences  - Avoid friction and shearing  - Provide appropriate hygiene as needed including keeping skin clean and dry  - Evaluate need for skin moisturizer/barrier cream  - Collaborate with interdisciplinary team   - Patient/family teaching  - Consider wound care consult   Outcome: Progressing     Problem: Nutrition/Hydration-ADULT  Goal: Nutrient/Hydration intake appropriate for improving, restoring or maintaining nutritional needs  Description: Monitor and assess patient's nutrition/hydration status for malnutrition   Collaborate with interdisciplinary team and initiate plan and interventions as ordered  Monitor patient's weight and dietary intake as ordered or per policy  Utilize nutrition screening tool and intervene as necessary  Determine patient's food preferences and provide high-protein, high-caloric foods as appropriate  INTERVENTIONS:  - Monitor oral intake, urinary output, labs, and treatment plans  - Assess nutrition and hydration status and recommend course of action  - Evaluate amount of meals eaten  - Assist patient with eating if necessary   - Allow adequate time for meals  - Recommend/ encourage appropriate diets, oral nutritional supplements, and vitamin/mineral supplements  - Order, calculate, and assess calorie counts as needed  - Recommend, monitor, and adjust tube feedings and TPN/PPN based on assessed needs  - Assess need for intravenous fluids  - Provide specific nutrition/hydration education as appropriate  - Include patient/family/caregiver in decisions related to nutrition  Outcome: Progressing     Problem: NEUROSENSORY - ADULT  Goal: Achieves maximal functionality and self care  Description: INTERVENTIONS  - Monitor swallowing and airway patency with patient fatigue and changes in neurological status  - Encourage and assist patient to increase activity and self care     - Encourage visually impaired, hearing impaired and aphasic patients to use assistive/communication devices  Outcome: Progressing  Goal: Achieves stable or improved neurological status  Description: INTERVENTIONS  - Monitor and report changes in neurological status  - Monitor vital signs such as temperature, blood pressure, glucose, and any other labs ordered   - Initiate measures to prevent increased intracranial pressure  - Monitor for seizure activity and implement precautions if appropriate      Outcome: Progressing     Problem: CARDIOVASCULAR - ADULT  Goal: Maintains optimal cardiac output and hemodynamic stability  Description: INTERVENTIONS:  - Monitor I/O, vital signs and rhythm  - Monitor for S/S and trends of decreased cardiac output  - Administer and titrate ordered vasoactive medications to optimize hemodynamic stability  - Assess quality of pulses, skin color and temperature  - Assess for signs of decreased coronary artery perfusion  - Instruct patient to report change in severity of symptoms  Outcome: Progressing     Problem: RESPIRATORY - ADULT  Goal: Achieves optimal ventilation and oxygenation  Description: INTERVENTIONS:  - Assess for changes in respiratory status  - Assess for changes in mentation and behavior  - Position to facilitate oxygenation and minimize respiratory effort  - Oxygen administered by appropriate delivery if ordered  - Initiate smoking cessation education as indicated  - Encourage broncho-pulmonary hygiene including cough, deep breathe, Incentive Spirometry  - Assess the need for suctioning and aspirate as needed  - Assess and instruct to report SOB or any respiratory difficulty  - Respiratory Therapy support as indicated  Outcome: Progressing     Problem: GASTROINTESTINAL - ADULT  Goal: Maintains adequate nutritional intake  Description: INTERVENTIONS:  - Monitor percentage of each meal consumed  - Identify factors contributing to decreased intake, treat as appropriate  - Assist with meals as needed  - Monitor I&O, weight, and lab values if indicated  - Obtain nutrition services referral as needed  Outcome: Progressing     Problem: METABOLIC, FLUID AND ELECTROLYTES - ADULT  Goal: Electrolytes maintained within normal limits  Description: INTERVENTIONS:  - Monitor labs and assess patient for signs and symptoms of electrolyte imbalances  - Administer electrolyte replacement as ordered  - Monitor response to electrolyte replacements, including repeat lab results as appropriate  - Instruct patient on fluid and nutrition as appropriate  Outcome: Progressing  Goal: Fluid balance maintained  Description: INTERVENTIONS:  - Monitor labs   - Monitor I/O and WT  - Instruct patient on fluid and nutrition as appropriate  - Assess for signs & symptoms of volume excess or deficit  Outcome: Progressing     Problem: SKIN/TISSUE INTEGRITY - ADULT  Goal: Skin Integrity remains intact(Skin Breakdown Prevention)  Description: Assess:  -Perform Erickson assessment every shift  -Clean and moisturize skin every day  -Inspect skin when repositioning, toileting, and assisting with ADLS  -Assess extremities for adequate circulation and sensation     Bed Management:  -Have minimal linens on bed & keep smooth, unwrinkled  -Change linens as needed when moist or perspiring  -Avoid sitting or lying in one position for more than 2 hours while in bed  -Keep HOB at 45 degrees     Toileting:  -Offer bedside commode  -Assess for incontinence every 2 hours  -Use incontinent care products after each incontinent episode such as brief    Activity:  -Mobilize patient 3-4 times a day  -Encourage activity and walks on unit  -Encourage or provide ROM exercises   -Turn and reposition patient every 2 Hours  -Use appropriate equipment to lift or move patient in bed  -Instruct/ Assist with weight shifting every 2 when out of bed in chair  -Consider limitation of chair time 2 hour intervals    Skin Care:  -Avoid use of baby powder, tape, friction and shearing, hot water or constrictive clothing  -Relieve pressure over bony prominences using pillows  -Do not massage red bony areas    Next Steps:  -Teach patient strategies to minimize risks such as walker  -Consider consults to  interdisciplinary teams such as wound  Outcome: Progressing  Goal: Incision(s), wounds(s) or drain site(s) healing without S/S of infection  Description: INTERVENTIONS  - Assess and document dressing, incision, wound bed, drain sites and surrounding tissue  - Provide patient and family education  - Perform skin care/dressing changes every PRN  Outcome: Progressing     Problem: MUSCULOSKELETAL - ADULT  Goal: Maintain or return mobility to safest level of function  Description: INTERVENTIONS:  - Assess patient's ability to carry out ADLs; assess patient's baseline for ADL function and identify physical deficits which impact ability to perform ADLs (bathing, care of mouth/teeth, toileting, grooming, dressing, etc )  - Assess/evaluate cause of self-care deficits   - Assess range of motion  - Assess patient's mobility  - Assess patient's need for assistive devices and provide as appropriate  - Encourage maximum independence but intervene and supervise when necessary  - Involve family in performance of ADLs  - Assess for home care needs following discharge   - Consider OT consult to assist with ADL evaluation and planning for discharge  - Provide patient education as appropriate  Outcome: Progressing

## 2022-10-16 NOTE — PROGRESS NOTES
Progress Note - Jordan Case 45 y o  female MRN: [id number]    Unit/Bed#: [facility] -01 Encounter: [id number]      Subjective: The patient is a bit sleepy today She is arousable and answered questions  She denied any pain or shortness of breath  She denied abdominal pain and nausea  She did eat some for breakfast     Physical Exam:   Temp:  [97 8 °F (36 6 °C)-97 9 °F (36 6 °C)] 97 8 °F (36 6 °C)  HR:  [104-116] 112  Resp:  [15-18] 15  BP: (117-128)/(81-89) 119/82    Gen:  Well-developed, frail, in no distress  Neck:  Supple  No lymphadenopathy, goiter, or bruit  Heart:  Regular rhythm  I heard no murmur, gallop, or rub  Lungs:  Clear to auscultation percussion  No wheezing, rales, or rhonchi  Abd:  Soft with active bowel sounds  No mass, tenderness, or organomegaly  Extremities:  No clubbing, cyanosis, or edema  No calf tenderness  Neuro:  Alert and oriented  No focal sign  Skin:  Warm and dry  LABS:  No new labs          Assessment/Plan:  1  Encephalopathy secondary to multiple metabolic derangements at the time of admission with white matter changes on MRI  2  MSSA bacteremia  3  History of depression  4  Lumbar radiculopathy  5  Rhabdomyolysis, resolved  6  Acute renal failure, resolved  7  Hypercalcemia possibly related to immobility  8  Chronic narcotic therapy  9  Tobacco abuse    The patient's metabolic derangements have been corrected  She remains on cefazolin for MSSA bacteremia  Her neurologic recovery has been slow and incomplete  I believe that this is consistent with her MRI findings  She has made significant neurologic recovery over the past week  Hopefully this is a good sign  She is being considered for acute rehab at [facility] in their brain injury unit        VTE Pharmacologic Prophylaxis: Heparin  VTE Mechanical Prophylaxis: sequential compression device

## 2022-10-16 NOTE — PLAN OF CARE
Problem: PAIN - ADULT  Goal: Verbalizes/displays adequate comfort level or baseline comfort level  Description: Interventions:  - Encourage patient to monitor pain and request assistance  - Assess pain using appropriate pain scale  - Administer analgesics based on type and severity of pain and evaluate response  - Implement non-pharmacological measures as appropriate and evaluate response  - Consider cultural and social influences on pain and pain management  - Notify physician/advanced practitioner if interventions unsuccessful or patient reports new pain  Outcome: Progressing     Problem: INFECTION - ADULT  Goal: Absence or prevention of progression during hospitalization  Description: INTERVENTIONS:  - Assess and monitor for signs and symptoms of infection  - Monitor lab/diagnostic results  - Monitor all insertion sites, i e  indwelling lines, tubes, and drains  - Monitor endotracheal if appropriate and nasal secretions for changes in amount and color  - [place] appropriate cooling/warming therapies per order  - Administer medications as ordered  - Instruct and encourage patient and family to use good hand hygiene technique  - Identify and instruct in appropriate isolation precautions for identified infection/condition  Outcome: Progressing     Problem: SAFETY ADULT  Goal: Patient will remain free of falls  Description: INTERVENTIONS:  - Educate patient/family on patient safety including physical limitations  - Instruct patient to call for assistance with activity   - Consult OT/PT to assist with strengthening/mobility   - Keep Call bell within reach  - Keep bed low and locked with side rails adjusted as appropriate  - Keep care items and personal belongings within reach  - Initiate and maintain comfort rounds  - Make Fall Risk Sign visible to staff  - Offer Toileting every 2 Hours, in advance of need  - Initiate/Maintain bed alarm  - Obtain necessary fall risk management equipment: walker  - Apply yellow socks and bracelet for high fall risk patients  - Consider moving patient to room near nurses station  Outcome: Progressing  Goal: Maintain or return to baseline ADL function  Description: INTERVENTIONS:  -  Assess patient's ability to carry out ADLs; assess patient's baseline for ADL function and identify physical deficits which impact ability to perform ADLs (bathing, care of mouth/teeth, toileting, grooming, dressing, etc )  - Assess/evaluate cause of self-care deficits   - Assess range of motion  - Assess patient's mobility; develop plan if impaired  - Assess patient's need for assistive devices and provide as appropriate  - Encourage maximum independence but intervene and supervise when necessary  - Involve family in performance of ADLs  - Assess for home care needs following discharge   - Consider OT consult to assist with ADL evaluation and planning for discharge  - Provide patient education as appropriate  Outcome: Progressing  Goal: Maintains/Returns to pre admission functional level  Description: INTERVENTIONS:  - Perform BMAT or MOVE assessment daily    - Set and communicate daily mobility goal to care team and patient/family/caregiver  - Collaborate with rehabilitation services on mobility goals if consulted  - Perform Range of Motion 4-6 times a day  - Reposition patient every 2 hours    - Dangle patient 3-4 times a day  - Stand patient 3 times a day  - Ambulate patient 3-4 times a day  - Out of bed for toileting  - Record patient progress and toleration of activity level   Outcome: Progressing     Problem: DISCHARGE PLANNING  Goal: Discharge to home or other facility with appropriate resources  Description: INTERVENTIONS:  - Identify barriers to discharge w/patient and caregiver  - Arrange for needed discharge resources and transportation as appropriate  - Identify discharge learning needs (meds, wound care, etc )  - Arrange for interpretive services to assist at discharge as needed  - Refer to Case Management Department for coordinating discharge planning if the patient needs post-hospital services based on physician/advanced practitioner order or complex needs related to functional status, cognitive ability, or social support system  Outcome: Progressing     Problem: Knowledge Deficit  Goal: Patient/family/caregiver demonstrates understanding of disease process, treatment plan, medications, and discharge instructions  Description: Complete learning assessment and assess knowledge base    Interventions:  - Provide teaching at level of understanding  - Provide teaching via preferred learning methods  Outcome: Progressing     Problem: Potential for Falls  Goal: Patient will remain free of falls  Description: INTERVENTIONS:  - Educate patient/family on patient safety including physical limitations  - Instruct patient to call for assistance with activity   - Consult OT/PT to assist with strengthening/mobility   - Keep Call bell within reach  - Keep bed low and locked with side rails adjusted as appropriate  - Keep care items and personal belongings within reach  - Initiate and maintain comfort rounds  - Make Fall Risk Sign visible to staff  - Offer Toileting every 2 Hours, in advance of need  - Initiate/Maintain bed alarm  - Obtain necessary fall risk management equipment: walker  - Apply yellow socks and bracelet for high fall risk patients  - Consider moving patient to room near nurses station  Outcome: Progressing     Problem: MOBILITY - ADULT  Goal: Maintain or return to baseline ADL function  Description: INTERVENTIONS:  -  Assess patient's ability to carry out ADLs; assess patient's baseline for ADL function and identify physical deficits which impact ability to perform ADLs (bathing, care of mouth/teeth, toileting, grooming, dressing, etc )  - Assess/evaluate cause of self-care deficits   - Assess range of motion  - Assess patient's mobility; develop plan if impaired  - Assess patient's need for assistive devices and provide as appropriate  - Encourage maximum independence but intervene and supervise when necessary  - Involve family in performance of ADLs  - Assess for home care needs following discharge   - Consider OT consult to assist with ADL evaluation and planning for discharge  - Provide patient education as appropriate  Outcome: Progressing  Goal: Maintains/Returns to pre admission functional level  Description: INTERVENTIONS:  - Perform BMAT or MOVE assessment daily    - Set and communicate daily mobility goal to care team and patient/family/caregiver  - Collaborate with rehabilitation services on mobility goals if consulted  - Perform Range of Motion 4-6 times a day  - Reposition patient every 2 hours  - Dangle patient 3-4 times a day  - Stand patient 3 times a day  - Ambulate patient 3-4 times a day  - Out of bed for toileting  - Record patient progress and toleration of activity level   Outcome: Progressing     Problem: Prexisting or High Potential for Compromised Skin Integrity  Goal: Skin integrity is maintained or improved  Description: INTERVENTIONS:  - Identify patients at risk for skin breakdown  - Assess and monitor skin integrity  - Assess and monitor nutrition and hydration status  - Monitor labs   - Assess for incontinence   - Turn and reposition patient  - Assist with mobility/ambulation  - Relieve pressure over bony prominences  - Avoid friction and shearing  - Provide appropriate hygiene as needed including keeping skin clean and dry  - Evaluate need for skin moisturizer/barrier cream  - Collaborate with interdisciplinary team   - Patient/family teaching  - Consider wound care consult   Outcome: Progressing     Problem: Nutrition/Hydration-ADULT  Goal: Nutrient/Hydration intake appropriate for improving, restoring or maintaining nutritional needs  Description: Monitor and assess patient's nutrition/hydration status for malnutrition   Collaborate with interdisciplinary team and initiate plan and interventions as ordered  Monitor patient's weight and dietary intake as ordered or per policy  Utilize nutrition screening tool and intervene as necessary  Determine patient's food preferences and provide high-protein, high-caloric foods as appropriate  INTERVENTIONS:  - Monitor oral intake, urinary output, labs, and treatment plans  - Assess nutrition and hydration status and recommend course of action  - Evaluate amount of meals eaten  - Assist patient with eating if necessary   - Allow adequate time for meals  - Recommend/ encourage appropriate diets, oral nutritional supplements, and vitamin/mineral supplements  - Order, calculate, and assess calorie counts as needed  - Recommend, monitor, and adjust tube feedings and TPN/PPN based on assessed needs  - Assess need for intravenous fluids  - Provide specific nutrition/hydration education as appropriate  - Include patient/family/caregiver in decisions related to nutrition  Outcome: Progressing     Problem: NEUROSENSORY - ADULT  Goal: Achieves maximal functionality and self care  Description: INTERVENTIONS  - Monitor swallowing and airway patency with patient fatigue and changes in neurological status  - Encourage and assist patient to increase activity and self care     - Encourage visually impaired, hearing impaired and aphasic patients to use assistive/communication devices  Outcome: Progressing  Goal: Achieves stable or improved neurological status  Description: INTERVENTIONS  - Monitor and report changes in neurological status  - Monitor vital signs such as temperature, blood pressure, glucose, and any other labs ordered   - Initiate measures to prevent increased intracranial pressure  - Monitor for seizure activity and implement precautions if appropriate      Outcome: Progressing     Problem: CARDIOVASCULAR - ADULT  Goal: Maintains optimal cardiac output and hemodynamic stability  Description: INTERVENTIONS:  - Monitor I/O, vital signs and rhythm  - Monitor for S/S and trends of decreased cardiac output  - Administer and titrate ordered vasoactive medications to optimize hemodynamic stability  - Assess quality of pulses, skin color and temperature  - Assess for signs of decreased coronary artery perfusion  - Instruct patient to report change in severity of symptoms  Outcome: Progressing     Problem: RESPIRATORY - ADULT  Goal: Achieves optimal ventilation and oxygenation  Description: INTERVENTIONS:  - Assess for changes in respiratory status  - Assess for changes in mentation and behavior  - Position to facilitate oxygenation and minimize respiratory effort  - Oxygen administered by appropriate delivery if ordered  - Initiate smoking cessation education as indicated  - Encourage broncho-pulmonary hygiene including cough, deep breathe, Incentive Spirometry  - Assess the need for suctioning and aspirate as needed  - Assess and instruct to report SOB or any respiratory difficulty  - Respiratory Therapy support as indicated  Outcome: Progressing     Problem: GASTROINTESTINAL - ADULT  Goal: Maintains adequate nutritional intake  Description: INTERVENTIONS:  - Monitor percentage of each meal consumed  - Identify factors contributing to decreased intake, treat as appropriate  - Assist with meals as needed  - Monitor I&O, weight, and lab values if indicated  - Obtain nutrition services referral as needed  Outcome: Progressing     Problem: METABOLIC, FLUID AND ELECTROLYTES - ADULT  Goal: Electrolytes maintained within normal limits  Description: INTERVENTIONS:  - Monitor labs and assess patient for signs and symptoms of electrolyte imbalances  - Administer electrolyte replacement as ordered  - Monitor response to electrolyte replacements, including repeat lab results as appropriate  - Instruct patient on fluid and nutrition as appropriate  Outcome: Progressing  Goal: Fluid balance maintained  Description: INTERVENTIONS:  - Monitor labs   - Monitor I/O and WT  - Instruct patient on fluid and nutrition as appropriate  - Assess for signs & symptoms of volume excess or deficit  Outcome: Progressing     Problem: SKIN/TISSUE INTEGRITY - ADULT  Goal: Skin Integrity remains intact(Skin Breakdown Prevention)  Description: Assess:  -Perform Erickson assessment every shift  -Clean and moisturize skin every day  -Inspect skin when repositioning, toileting, and assisting with ADLS  -Assess extremities for adequate circulation and sensation     Bed Management:  -Have minimal linens on bed & keep smooth, unwrinkled  -Change linens as needed when moist or perspiring  -Avoid sitting or lying in one position for more than 2 hours while in bed  -Keep HOB at 45 degrees     Toileting:  -Offer bedside commode  -Assess for incontinence every 2 hours  -Use incontinent care products after each incontinent episode such as brief    Activity:  -Mobilize patient 3-4 times a day  -Encourage activity and walks on unit  -Encourage or provide ROM exercises   -Turn and reposition patient every 2 Hours  -Use appropriate equipment to lift or move patient in bed  -Instruct/ Assist with weight shifting every 2 when out of bed in chair  -Consider limitation of chair time 2 hour intervals    Skin Care:  -Avoid use of baby powder, tape, friction and shearing, hot water or constrictive clothing  -Relieve pressure over bony prominences using pillows  -Do not massage red bony areas    Next Steps:  -Teach patient strategies to minimize risks such as walker  -Consider consults to  interdisciplinary teams such as wound  Outcome: Progressing  Goal: Incision(s), wounds(s) or drain site(s) healing without S/S of infection  Description: INTERVENTIONS  - Assess and document dressing, incision, wound bed, drain sites and surrounding tissue  - Provide patient and family education  - Perform skin care/dressing changes every PRN  Outcome: Progressing     Problem: MUSCULOSKELETAL - ADULT  Goal: Maintain or return mobility to safest level of function  Description: INTERVENTIONS:  - Assess patient's ability to carry out ADLs; assess patient's baseline for ADL function and identify physical deficits which impact ability to perform ADLs (bathing, care of mouth/teeth, toileting, grooming, dressing, etc )  - Assess/evaluate cause of self-care deficits   - Assess range of motion  - Assess patient's mobility  - Assess patient's need for assistive devices and provide as appropriate  - Encourage maximum independence but intervene and supervise when necessary  - Involve family in performance of ADLs  - Assess for home care needs following discharge   - Consider OT consult to assist with ADL evaluation and planning for discharge  - Provide patient education as appropriate  Outcome: Progressing

## 2022-10-17 RX ADMIN — GABAPENTIN 300 MG: 300 CAPSULE ORAL at 22:42

## 2022-10-17 RX ADMIN — CEFAZOLIN SODIUM 2000 MG: 2 SOLUTION INTRAVENOUS at 05:44

## 2022-10-17 RX ADMIN — CEFAZOLIN SODIUM 2000 MG: 2 SOLUTION INTRAVENOUS at 14:07

## 2022-10-17 RX ADMIN — VENLAFAXINE HYDROCHLORIDE 150 MG: 150 CAPSULE, EXTENDED RELEASE ORAL at 09:03

## 2022-10-17 RX ADMIN — GABAPENTIN 300 MG: 300 CAPSULE ORAL at 09:02

## 2022-10-17 RX ADMIN — Medication 1 PATCH: at 09:12

## 2022-10-17 RX ADMIN — ACETAMINOPHEN 650 MG: 325 TABLET, FILM COATED ORAL at 02:17

## 2022-10-17 RX ADMIN — HEPARIN SODIUM 5000 UNITS: 5000 INJECTION INTRAVENOUS; SUBCUTANEOUS at 14:08

## 2022-10-17 RX ADMIN — METOPROLOL TARTRATE 25 MG: 25 TABLET, FILM COATED ORAL at 22:42

## 2022-10-17 RX ADMIN — GABAPENTIN 300 MG: 300 CAPSULE ORAL at 17:42

## 2022-10-17 RX ADMIN — CEFAZOLIN SODIUM 2000 MG: 2 SOLUTION INTRAVENOUS at 22:47

## 2022-10-17 RX ADMIN — MICONAZOLE NITRATE: 20 CREAM TOPICAL at 17:45

## 2022-10-17 RX ADMIN — QUETIAPINE FUMARATE 50 MG: 25 TABLET ORAL at 09:03

## 2022-10-17 RX ADMIN — Medication 250 MG: at 09:04

## 2022-10-17 RX ADMIN — QUETIAPINE FUMARATE 50 MG: 25 TABLET ORAL at 17:42

## 2022-10-17 RX ADMIN — HEPARIN SODIUM 5000 UNITS: 5000 INJECTION INTRAVENOUS; SUBCUTANEOUS at 05:50

## 2022-10-17 RX ADMIN — MICONAZOLE NITRATE: 20 CREAM TOPICAL at 09:04

## 2022-10-17 RX ADMIN — ACETAMINOPHEN 650 MG: 325 TABLET, FILM COATED ORAL at 17:43

## 2022-10-17 RX ADMIN — METOPROLOL TARTRATE 25 MG: 25 TABLET, FILM COATED ORAL at 09:02

## 2022-10-17 RX ADMIN — AMLODIPINE BESYLATE 10 MG: 10 TABLET ORAL at 09:02

## 2022-10-17 RX ADMIN — Medication 250 MG: at 17:42

## 2022-10-17 RX ADMIN — HEPARIN SODIUM 5000 UNITS: 5000 INJECTION INTRAVENOUS; SUBCUTANEOUS at 22:42

## 2022-10-17 NOTE — SPEECH THERAPY NOTE
Speech Language/Pathology    Speech/Language Pathology Progress Note    Patient Name: Caryle Harsh  Today's Date: 10/17/2022     Problem List  Principal Problem:    Toxic metabolic encephalopathy  Active Problems:    Depression    Tobacco abuse    DDD (degenerative disc disease), lumbosacral    ARF (acute renal failure) (HCC)    Transaminitis    Abnormal CT of the chest    Traumatic rhabdomyolysis (HCC)    D-dimer, elevated    Leg edema, right    Localized swelling of right upper extremity    Bacteremia due to methicillin susceptible Staphylococcus aureus (MSSA)    Pulmonary edema    Aspiration pneumonitis (HCC)    Hypokalemia    Hypernatremia    Diarrhea    Mild protein-calorie malnutrition (HCC)       Past Medical History  Past Medical History:   Diagnosis Date   • Chronic pain disorder    • Depression    • GERD (gastroesophageal reflux disease)    • History of electroconvulsive therapy    • Low back pain    • Self-injurious behavior    • Suicide attempt [facility])         Past Surgical History  Past Surgical History:   Procedure Laterality Date   •  SECTION     • COLONOSCOPY     • PANCREAS SURGERY      "pseudocysts" per patient's  Ricki Infante   • [place] ESOPHAGOGASTRODUODENOSCOPY TRANSORAL DIAGNOSTIC N/A 4/10/2018    Procedure: EGD AND COLONOSCOPY;  Surgeon: Gerardo Wells MD;  [place] [facility]; Service: Gastroenterology         Subjective:  Pt alert today  Noted to be walking around the unit w/ OT earlier  Pt has 1:1   Objective:  Pt seen for upgraded trial at dinner  Pt was watching tv earlier and requested pizza  Seen w/ pizza  Cut by   Handed the pt the pizza and she fed herself  mastication was WNL/appropriate  Needed encouragement to take more  transfer and swallow were prompt w/ food and liquid  Assessment:  More alert and verbal  Tolerated pizza  Encouraged to keep eating  Did not initiate picking it up  Plan/Recommendations:  Upgrade diet to regular texture, thin liquids   ? F/u 1-2xs

## 2022-10-17 NOTE — PHYSICAL THERAPY NOTE
Physical Therapy Cancellation Note:    Per nursing staff, pt was given medication to "calm down" and requested pt to be seen and treated for PT services at a later time  Will cont to follow as able

## 2022-10-17 NOTE — PLAN OF CARE
Problem: PAIN - ADULT  Goal: Verbalizes/displays adequate comfort level or baseline comfort level  Description: Interventions:  - Encourage patient to monitor pain and request assistance  - Assess pain using appropriate pain scale  - Administer analgesics based on type and severity of pain and evaluate response  - Implement non-pharmacological measures as appropriate and evaluate response  - Consider cultural and social influences on pain and pain management  - Notify physician/advanced practitioner if interventions unsuccessful or patient reports new pain  Outcome: Progressing     Problem: INFECTION - ADULT  Goal: Absence or prevention of progression during hospitalization  Description: INTERVENTIONS:  - Assess and monitor for signs and symptoms of infection  - Monitor lab/diagnostic results  - Monitor all insertion sites, i e  indwelling lines, tubes, and drains  - Monitor endotracheal if appropriate and nasal secretions for changes in amount and color  - [place] appropriate cooling/warming therapies per order  - Administer medications as ordered  - Instruct and encourage patient and family to use good hand hygiene technique  - Identify and instruct in appropriate isolation precautions for identified infection/condition  Outcome: Progressing     Problem: SAFETY ADULT  Goal: Patient will remain free of falls  Description: INTERVENTIONS:  - Educate patient/family on patient safety including physical limitations  - Instruct patient to call for assistance with activity   - Consult OT/PT to assist with strengthening/mobility   - Keep Call bell within reach  - Keep bed low and locked with side rails adjusted as appropriate  - Keep care items and personal belongings within reach  - Initiate and maintain comfort rounds  - Make Fall Risk Sign visible to staff  - Offer Toileting every 2 Hours, in advance of need  - Initiate/Maintain bed alarm  - Obtain necessary fall risk management equipment: walker  - Apply yellow socks and bracelet for high fall risk patients  - Consider moving patient to room near nurses station  Outcome: Progressing  Goal: Maintain or return to baseline ADL function  Description: INTERVENTIONS:  -  Assess patient's ability to carry out ADLs; assess patient's baseline for ADL function and identify physical deficits which impact ability to perform ADLs (bathing, care of mouth/teeth, toileting, grooming, dressing, etc )  - Assess/evaluate cause of self-care deficits   - Assess range of motion  - Assess patient's mobility; develop plan if impaired  - Assess patient's need for assistive devices and provide as appropriate  - Encourage maximum independence but intervene and supervise when necessary  - Involve family in performance of ADLs  - Assess for home care needs following discharge   - Consider OT consult to assist with ADL evaluation and planning for discharge  - Provide patient education as appropriate  Outcome: Progressing  Goal: Maintains/Returns to pre admission functional level  Description: INTERVENTIONS:  - Perform BMAT or MOVE assessment daily    - Set and communicate daily mobility goal to care team and patient/family/caregiver  - Collaborate with rehabilitation services on mobility goals if consulted  - Perform Range of Motion 4-6 times a day  - Reposition patient every 2 hours    - Dangle patient 3-4 times a day  - Stand patient 3 times a day  - Ambulate patient 3-4 times a day  - Out of bed for toileting  - Record patient progress and toleration of activity level   Outcome: Progressing     Problem: DISCHARGE PLANNING  Goal: Discharge to home or other facility with appropriate resources  Description: INTERVENTIONS:  - Identify barriers to discharge w/patient and caregiver  - Arrange for needed discharge resources and transportation as appropriate  - Identify discharge learning needs (meds, wound care, etc )  - Arrange for interpretive services to assist at discharge as needed  - Refer to Case Management Department for coordinating discharge planning if the patient needs post-hospital services based on physician/advanced practitioner order or complex needs related to functional status, cognitive ability, or social support system  Outcome: Progressing     Problem: Knowledge Deficit  Goal: Patient/family/caregiver demonstrates understanding of disease process, treatment plan, medications, and discharge instructions  Description: Complete learning assessment and assess knowledge base    Interventions:  - Provide teaching at level of understanding  - Provide teaching via preferred learning methods  Outcome: Progressing     Problem: Potential for Falls  Goal: Patient will remain free of falls  Description: INTERVENTIONS:  - Educate patient/family on patient safety including physical limitations  - Instruct patient to call for assistance with activity   - Consult OT/PT to assist with strengthening/mobility   - Keep Call bell within reach  - Keep bed low and locked with side rails adjusted as appropriate  - Keep care items and personal belongings within reach  - Initiate and maintain comfort rounds  - Make Fall Risk Sign visible to staff  - Offer Toileting every 2 Hours, in advance of need  - Initiate/Maintain bed alarm  - Obtain necessary fall risk management equipment: walker  - Apply yellow socks and bracelet for high fall risk patients  - Consider moving patient to room near nurses station  Outcome: Progressing     Problem: MOBILITY - ADULT  Goal: Maintain or return to baseline ADL function  Description: INTERVENTIONS:  -  Assess patient's ability to carry out ADLs; assess patient's baseline for ADL function and identify physical deficits which impact ability to perform ADLs (bathing, care of mouth/teeth, toileting, grooming, dressing, etc )  - Assess/evaluate cause of self-care deficits   - Assess range of motion  - Assess patient's mobility; develop plan if impaired  - Assess patient's need for assistive devices and provide as appropriate  - Encourage maximum independence but intervene and supervise when necessary  - Involve family in performance of ADLs  - Assess for home care needs following discharge   - Consider OT consult to assist with ADL evaluation and planning for discharge  - Provide patient education as appropriate  Outcome: Progressing  Goal: Maintains/Returns to pre admission functional level  Description: INTERVENTIONS:  - Perform BMAT or MOVE assessment daily    - Set and communicate daily mobility goal to care team and patient/family/caregiver  - Collaborate with rehabilitation services on mobility goals if consulted  - Perform Range of Motion 4-6 times a day  - Reposition patient every 2 hours  - Dangle patient 3-4 times a day  - Stand patient 3 times a day  - Ambulate patient 3-4 times a day  - Out of bed for toileting  - Record patient progress and toleration of activity level   Outcome: Progressing     Problem: Prexisting or High Potential for Compromised Skin Integrity  Goal: Skin integrity is maintained or improved  Description: INTERVENTIONS:  - Identify patients at risk for skin breakdown  - Assess and monitor skin integrity  - Assess and monitor nutrition and hydration status  - Monitor labs   - Assess for incontinence   - Turn and reposition patient  - Assist with mobility/ambulation  - Relieve pressure over bony prominences  - Avoid friction and shearing  - Provide appropriate hygiene as needed including keeping skin clean and dry  - Evaluate need for skin moisturizer/barrier cream  - Collaborate with interdisciplinary team   - Patient/family teaching  - Consider wound care consult   Outcome: Progressing     Problem: Nutrition/Hydration-ADULT  Goal: Nutrient/Hydration intake appropriate for improving, restoring or maintaining nutritional needs  Description: Monitor and assess patient's nutrition/hydration status for malnutrition   Collaborate with interdisciplinary team and initiate plan and interventions as ordered  Monitor patient's weight and dietary intake as ordered or per policy  Utilize nutrition screening tool and intervene as necessary  Determine patient's food preferences and provide high-protein, high-caloric foods as appropriate  INTERVENTIONS:  - Monitor oral intake, urinary output, labs, and treatment plans  - Assess nutrition and hydration status and recommend course of action  - Evaluate amount of meals eaten  - Assist patient with eating if necessary   - Allow adequate time for meals  - Recommend/ encourage appropriate diets, oral nutritional supplements, and vitamin/mineral supplements  - Order, calculate, and assess calorie counts as needed  - Recommend, monitor, and adjust tube feedings and TPN/PPN based on assessed needs  - Assess need for intravenous fluids  - Provide specific nutrition/hydration education as appropriate  - Include patient/family/caregiver in decisions related to nutrition  Outcome: Progressing     Problem: NEUROSENSORY - ADULT  Goal: Achieves maximal functionality and self care  Description: INTERVENTIONS  - Monitor swallowing and airway patency with patient fatigue and changes in neurological status  - Encourage and assist patient to increase activity and self care     - Encourage visually impaired, hearing impaired and aphasic patients to use assistive/communication devices  Outcome: Progressing  Goal: Achieves stable or improved neurological status  Description: INTERVENTIONS  - Monitor and report changes in neurological status  - Monitor vital signs such as temperature, blood pressure, glucose, and any other labs ordered   - Initiate measures to prevent increased intracranial pressure  - Monitor for seizure activity and implement precautions if appropriate      Outcome: Progressing     Problem: CARDIOVASCULAR - ADULT  Goal: Maintains optimal cardiac output and hemodynamic stability  Description: INTERVENTIONS:  - Monitor I/O, vital signs and rhythm  - Monitor for S/S and trends of decreased cardiac output  - Administer and titrate ordered vasoactive medications to optimize hemodynamic stability  - Assess quality of pulses, skin color and temperature  - Assess for signs of decreased coronary artery perfusion  - Instruct patient to report change in severity of symptoms  Outcome: Progressing     Problem: RESPIRATORY - ADULT  Goal: Achieves optimal ventilation and oxygenation  Description: INTERVENTIONS:  - Assess for changes in respiratory status  - Assess for changes in mentation and behavior  - Position to facilitate oxygenation and minimize respiratory effort  - Oxygen administered by appropriate delivery if ordered  - Initiate smoking cessation education as indicated  - Encourage broncho-pulmonary hygiene including cough, deep breathe, Incentive Spirometry  - Assess the need for suctioning and aspirate as needed  - Assess and instruct to report SOB or any respiratory difficulty  - Respiratory Therapy support as indicated  Outcome: Progressing     Problem: GASTROINTESTINAL - ADULT  Goal: Maintains adequate nutritional intake  Description: INTERVENTIONS:  - Monitor percentage of each meal consumed  - Identify factors contributing to decreased intake, treat as appropriate  - Assist with meals as needed  - Monitor I&O, weight, and lab values if indicated  - Obtain nutrition services referral as needed  Outcome: Progressing     Problem: METABOLIC, FLUID AND ELECTROLYTES - ADULT  Goal: Electrolytes maintained within normal limits  Description: INTERVENTIONS:  - Monitor labs and assess patient for signs and symptoms of electrolyte imbalances  - Administer electrolyte replacement as ordered  - Monitor response to electrolyte replacements, including repeat lab results as appropriate  - Instruct patient on fluid and nutrition as appropriate  Outcome: Progressing  Goal: Fluid balance maintained  Description: INTERVENTIONS:  - Monitor labs   - Monitor I/O and WT  - Instruct patient on fluid and nutrition as appropriate  - Assess for signs & symptoms of volume excess or deficit  Outcome: Progressing     Problem: SKIN/TISSUE INTEGRITY - ADULT  Goal: Skin Integrity remains intact(Skin Breakdown Prevention)  Description: Assess:  -Perform [place]ks[place] assessment every shift  -Clean and moisturize skin every day  -Inspect skin when repositioning, toileting, and assisting with ADLS  -Assess extremities for adequate circulation and sensation     Bed Management:  -Have minimal linens on bed & keep smooth, unwrinkled  -Change linens as needed when moist or perspiring  -Avoid sitting or lying in one position for more than 2 hours while in bed  -Keep HOB at 45 degrees     Toileting:  -Offer bedside commode  -Assess for incontinence every 2 hours  -Use incontinent care products after each incontinent episode such as brief    Activity:  -Mobilize patient 3-4 times a day  -Encourage activity and walks on unit  -Encourage or provide ROM exercises   -Turn and reposition patient every 2 Hours  -Use appropriate equipment to lift or move patient in bed  -Instruct/ Assist with weight shifting every 2 when out of bed in chair  -Consider limitation of chair time 2 hour intervals    Skin Care:  -Avoid use of baby powder, tape, friction and shearing, hot water or constrictive clothing  -Relieve pressure over bony prominences using pillows  -Do not massage red bony areas    Next Steps:  -Teach patient strategies to minimize risks such as walker  -Consider consults to  interdisciplinary teams such as wound  Outcome: Progressing  Goal: Incision(s), wounds(s) or drain site(s) healing without S/S of infection  Description: INTERVENTIONS  - Assess and document dressing, incision, wound bed, drain sites and surrounding tissue  - Provide patient and family education  - Perform skin care/dressing changes every PRN  Outcome: Progressing     Problem: MUSCULOSKELETAL - ADULT  Goal: Maintain or return mobility to safest level of function  Description: INTERVENTIONS:  - Assess patient's ability to carry out ADLs; assess patient's baseline for ADL function and identify physical deficits which impact ability to perform ADLs (bathing, care of mouth/teeth, toileting, grooming, dressing, etc )  - Assess/evaluate cause of self-care deficits   - Assess range of motion  - Assess patient's mobility  - Assess patient's need for assistive devices and provide as appropriate  - Encourage maximum independence but intervene and supervise when necessary  - Involve family in performance of ADLs  - Assess for home care needs following discharge   - Consider OT consult to assist with ADL evaluation and planning for discharge  - Provide patient education as appropriate  Outcome: Progressing

## 2022-10-17 NOTE — PROGRESS NOTES
[facility]  [facility] 1970, 45 y [facility]  female MRN: [id number]  Unit/Bed#: [place] [address] -01 Encounter: [id number]  Primary Care Provider: Devika Bowser MD   Date and time admitted to hospital: 2022  8:04 AM    * Toxic metabolic encephalopathy  Assessment & Plan  Due to multiple metabolic derangements  MRI does show some injury  Mental status is improving, but far from normal   Will benefit from acute rehab      Bacteremia due to methicillin susceptible Staphylococcus aureus (MSSA)  Assessment & Plan  MSSA bacteremia, repeat cultures neg for greater than 72 hours  Echo with no vegetation  On IV ancef through 10/25    ARF (acute renal failure) ([facility])  Assessment & Plan  · ANUJA/ATN secondary to rhabdomyolysis  No evidence of renal obstruction on imaging    Kidney function has returned to normal    Results from last 7 days   Lab Units 10/13/22  0504   BUN mg/dL 18   CREATININE mg/dL 1 18   EGFR ml/min/1 73sq m 52         Subjective:   No complaints  Does answer questions, but very short answers  Has a short attention span  Objective:     Vitals:   Temp (24hrs), Av 9 °F (36 6 °C), Min:97 7 °F (36 5 °C), Max:98 3 °F (36 8 °C)    Temp:  [97 7 °F (36 5 °C)-98 3 °F (36 8 °C)] 97 7 °F (36 5 °C)  HR:  [] 99  Resp:  [15-20] 20  BP: (119-136)/(79-85) 136/79  SpO2:  [95 %-97 %] 95 %  Body mass index is 18 4 kg/m²  Input and Output Summary (last 24 hours):     No intake or output data in the 24 hours ending 10/17/22 1050    Physical Exam:     Physical Exam  Vitals and nursing note reviewed  HENT:      Head: Normocephalic and atraumatic  Eyes:      Pupils: Pupils are equal, round, and reactive to light  Cardiovascular:      Rate and Rhythm: Normal rate and regular rhythm  Heart sounds: No murmur heard  No friction rub  No gallop  Pulmonary:      Effort: Pulmonary effort is normal       Breath sounds: Normal breath sounds   No wheezing or rales    Abdominal:      General: Bowel sounds are normal       Palpations: Abdomen is soft  Tenderness: There is no abdominal tenderness  Musculoskeletal:      Right lower leg: No edema  Left lower leg: No edema                 Additional Data:     Labs:    Results from last 7 days   Lab Units 10/13/22  0504   WBC Thousand/uL 9 99   HEMOGLOBIN g/dL 9 8*   HEMATOCRIT % 30 8*   PLATELETS Thousands/uL 266   NEUTROS PCT % 73   LYMPHS PCT % 20   MONOS PCT % 5   EOS PCT % 1     Results from last 7 days   Lab Units 10/13/22  0504   POTASSIUM mmol/L 4 1   CHLORIDE mmol/L 103   CO2 mmol/L 26   BUN mg/dL 18   CREATININE mg/dL 1 18   CALCIUM mg/dL 10 5*   ALK PHOS U/L 93   ALT U/L 9*   AST U/L 24                       * I Have Reviewed All Lab Data     Recent Cultures (last 7 days):             Last 24 Hours Medication List:   Current Facility-Administered Medications   Medication Dose Route Frequency Provider Last Rate   • acetaminophen  650 mg Oral Q6H PRN Duey Riding, DO     • ALPRAZolam  0 5 mg Oral HS PRN Carmela Stoner MD     • amLODIPine  10 mg Oral Daily Sam Richardson MD     • cefazolin  2,000 mg Intravenous Q8H Meghan Hayden MD 2,000 mg (10/17/22 0544)   • gabapentin  300 mg Oral TID Maite Avelar MD     • heparin (porcine)  5,000 Units Subcutaneous [facility] Jones Adan, DO     • HYDROmorphone  0 5 mg Intravenous Q4H PRN Duey Riding, DO     • levalbuterol  1 25 mg Nebulization Q4H PRN Karla Person DO     • loperamide  2 mg Oral 4x Daily PRN Carmela Stoner MD     • metoprolol tartrate  25 mg Oral Q12H [facility] Inder Jaquez MD     • VANDANA ANTIFUNGAL   Topical BID [employer] Delaney Mcdaniel, DO     • nicotine  1 patch Transdermal Daily Atul Adan DO     • OLANZapine  5 mg Oral Q6H PRN Karla Person DO     • ondansetron  4 mg Intravenous Q4H PRN Luís Riding, DO     • QUEtiapine  50 mg Oral BID Carmela Stoner MD     • saccharomyces boulardii  250 mg Oral BID Carmela Stoner MD     • venlafaxine  150 mg Oral Daily Stephanie Sprague MD           VTE Pharmacologic Prophylaxis:   Pharmacologic: Heparin      Current Length of Stay: 28 day(s)    Current Patient Status: Inpatient       Discharge Plan: looking for acute rehab    Code Status: Level 1 - Full Code           Today, Patient Was Seen By: Michael Jarvis    ** Please Note: Dictation voice to text software may have been used in the creation of this document   **

## 2022-10-17 NOTE — ASSESSMENT & PLAN NOTE
Due to multiple metabolic derangements  MRI does show some injury    Mental status is improving, but far from normal   Will benefit from acute rehab

## 2022-10-17 NOTE — PROGRESS NOTES
----- Message from Samreen Ravi sent at 8/21/2020 11:39 AM CDT -----  Contact: Saman aragon [phone]  Type:  Needs Medical Advice    Who Called: Saman aragon  Symptoms (please be specific):   How long has patient had these symptoms:   Pharmacy name and phone #:    Would the patient rather a call back or a response via [facility]? Call back  Best Call Back Number: [phone]  Additional Information: Mom is requesting a call back from Dr Pride regarding constipation and wanted some advice     Progress Note - Infectious Disease   Gildardo Zurita 45 y o  female MRN: [id number]  Unit/Bed#: [place] [address] -01 Encounter: [id number]    Impression/Plan:  1  SIRS vs Sepsis  Evolving since admission  Fever, tachycardia, and mild leukocytosis  Suspect this is likely due to MSSA bacteremia  No other clear source appreciated  COVID-19 negative   UA benign   Status post lumbar puncture for altered mental status with negative ME panel   Patient has improved with no recurrent high fever spike  No lactic acidosis  This morning she is afebrile  Her WBC count has improved  Repeat blood cultures were negative >5 days    -antibiotics as below   -monitor CBCD and BMP  -monitor vitals  -supportive care      2  MSSA bacteremia  Patient was found down for prolonged period of time with evidence of multiple wounds on admission  Suspect cutaneous vs endovascular source, likely phlebitis as there is report of RUE erythema surrounding prior IV in addition to palpable cord in left antecub  TTE negative for obvious vegetation  CHERYLE was planned but then cancelled due to tenuous respiratory status  Repeat TTE still without good visualization of aortic valve  The patient is currently receiving IV cefazolin which she is tolerating without difficulty  Anticipate 4 weeks of IV antibiotic   -continue IV cefazolin through 10/25/2022 for 4 weeks of antibiotic treatment after cleared blood cultures  -weekly CBCD and creatinine while on IV antibiotic  -monitor vitals  -patient will require outpatient ID follow up after discharge, I will coordinate as needed and place information in discharge planning     3  Toxic Metabolic encephalopathy   Likely multifactorial etiology with uremia playing a role  This was initially thought to be secondary to gabapentin and morphine use in the setting of renal failure, hypotension, rhabdomyolysis   MRI brain negative   Lumbar puncture with opening pressure 26   Negative ME panel  CSF fluid culture not collected   The most likely to be secondary to metabolic issues, medication effect, and possible Wernicke's encephalopathy  Psychiatric issues also likely playing a role  Neurology has reassessed patient  She will receive thiamine, no additional recommendations at this time  Mental status remains significantly altered  [facility]   -serial neuro exams  -monitor cognition, mood, and mental status  -treat metabolic issues and psychiatric issues  -supportive care  -continue follow up with behavioral health  -continue follow up with neurology   -no additional ID workup needed     4  Acute renal failure   Creatinine on admission 5 88 with CK over 32,000  Creatinine reached plateau, now down trending   Likely multifactorial etiology in the setting of rhabdomyolysis and dehydration  Likely ANUJA/ATN    Most recent creatinine was improved to 1  18   -monitor creatinine  -dose adjust antibiotics for renal function as needed  -avoid nephrotoxins  -continue follow up with nephrology     5  Tobacco abuse   Encourage cessation as recommended by primary team   -NRT per primary service     6  Antibiotic allergy  Reports hives with penicillin  Patient has been tolerating cephalosporin without difficulty  -monitor patient for adverse medication reactions      Above plan was discussed in detail with patient at the bedside  Above plan was discussed in detail with SLIM  Antibiotics:  Cefazolin 20  Antibiotics 20    Subjective:  Limited ROS  Patient more interactive but offers minimal details  She denies pain in her chest and abdomen  She denies difficulty breathing  She is currently on 1:1 observation      Objective:  Vitals:  Temp:  [97 7 °F (36 5 °C)-98 3 °F (36 8 °C)] 97 7 °F (36 5 °C)  HR:  [] 99  Resp:  [15-20] 20  BP: (119-136)/(79-85) 136/79  SpO2:  [95 %-97 %] 95 %  Temp (24hrs), Av 9 °F (36 6 °C), Min:97 7 °F (36 5 °C), Max:98 3 °F (36 8 °C)  Current: Temperature: 97 7 °F (36 5 °C)    Physical Exam:   General Appearance:  Alert, interactive but confused, nontoxic, in no acute distress  Slightly less restless  Throat: Oropharynx moist without lesions  Lungs:   Clear to auscultation bilaterally; no wheezes, rhonchi or rales; respirations unlabored on room air  Heart:  Tachycardic; no murmur, rub or gallop  Abdomen:   Soft, non-tender, non-distended, positive bowel sounds  Extremities: No clubbing or cyanosis, no edema  Skin: No new rashes, lesions, or draining wounds noted on exposed skin       Labs, Imaging, & Other studies:   All pertinent labs and imaging studies were personally reviewed  Results from last 7 days   Lab Units 10/13/22  0504   WBC Thousand/uL 9 99   HEMOGLOBIN g/dL 9 8*   PLATELETS Thousands/uL 266     Results from last 7 days   Lab Units 10/13/22  0504   POTASSIUM mmol/L 4 1   CHLORIDE mmol/L 103   CO2 mmol/L 26   BUN mg/dL 18   CREATININE mg/dL 1 18   EGFR ml/min/1 73sq m 52   CALCIUM mg/dL 10 5*   AST U/L 24   ALT U/L 9*   ALK PHOS U/L 93

## 2022-10-17 NOTE — PLAN OF CARE
Problem: OCCUPATIONAL THERAPY ADULT  Goal: Performs self-care activities at highest level of function for planned discharge setting  See evaluation for individualized goals  Description: Treatment Interventions: ADL retraining, Functional transfer training, UE strengthening/ROM, Endurance training, Cognitive reorientation, Patient/family training, Equipment evaluation/education, Fine motor coordination activities, Compensatory technique education, Continued evaluation, Energy conservation, Activityengagement          See flowsheet documentation for full assessment, interventions and recommendations  Outcome: Progressing  Note: Limitation: Decreased ADL status, Decreased Safe judgement during ADL, Decreased cognition, Decreased endurance, Decreased high-level ADLs     Assessment: Patient participated in skilled OT session this date with interventions consisting of therapeutic activities, therapeutic exercises, neuro-reeducation, and self-care/ADL training to increase B UE strength, functional endurance/activity tolerance, static/dynamic sitting/standing balance, and overall safety awareness to increase (I) with ADLs/IADLs to return to PLOF  Provided education on HEP, energy conservation techniques, deep breathing techniques, fall prevention strategies, and overall safety awareness  Patient agreeable to OT treatment session, upon arrival patient was found supine in bed  Patient requiring verbal cues for safety and verbal cues for pacing through activity steps  Refer to flowsheet for functional performance  Pt demonstrates improvement towards POC  Updated POC/goals  Patient continues to be functioning below baseline level, occupational performance remains limited secondary to factors listed above and increased risk for falls and injury  The patient's raw score on the AM-PAC Daily Activity inpatient short form is 12, standardized score is 30 6, less than 39 4   Patients at this level are likely to benefit from discharge to post-acute rehabilitation services  Please refer to the recommendation of the Occupational Therapist for safe discharge planning       OT Discharge Recommendation: Post acute rehabilitation services

## 2022-10-17 NOTE — QUICK NOTE
Repeat MRI brain (w and wo contrast) was completed on 10/14  Initial MRI brain limited due to motion  MRI revealed development of severe periventricular and white matter T 2 hyperintensities in the supratentorial region and in the lavelle, without mass effect, enhancement or hemorrhage  Findings can be associated with toxic-encephalopathy, which patient's presentation has been attributed to  Per primary team note, patient has made "significant recovery" in regards to her mental status over the past week  Expect that patient will continue to slowly improve, even despite the MRI changes, though actual prognostication is difficult  Will order repeat MRI brain w and wo contrast to be completed in 3 months, then will have patient follow up with a general neurology attending at that point  The office can call the patient's  to schedule the appointment

## 2022-10-17 NOTE — ASSESSMENT & PLAN NOTE
Due to multiple metabolic derangements  MRI does show some injury    Mental status is improving, but far from normal   Will benefit from acute rehab    Trying an increase of Effexor XR to see if this helps with her mood

## 2022-10-17 NOTE — OCCUPATIONAL THERAPY NOTE
Occupational Therapy Progress Note     Patient Name: Alejandro Benavides  Today's Date: 10/17/2022  Problem List  Principal Problem:    Toxic metabolic encephalopathy  Active Problems:    Depression    Tobacco abuse    DDD (degenerative disc disease), lumbosacral    ARF (acute renal failure) (HCC)    Transaminitis    Abnormal CT of the chest    Traumatic rhabdomyolysis (HCC)    D-dimer, elevated    Leg edema, right    Localized swelling of right upper extremity    Bacteremia due to methicillin susceptible Staphylococcus aureus (MSSA)    Pulmonary edema    Aspiration pneumonitis (HCC)    Hypokalemia    Hypernatremia    Diarrhea    Mild protein-calorie malnutrition ([facility] [address] )       10/17/22 1505   OT Last Visit   OT Visit Date 10/17/22   Note Type   Note Type Treatment   Pain Assessment   Pain Assessment Tool 0-10   Pain Score   ("A lot")   Pain Location/Orientation Orientation: Right;[place] Foot; [place] Knee   Hospital Pain Intervention(s) Ambulation/increased activity   Pain Rating: FLACC (Rest) - Face 0   Pain Rating: FLACC (Rest) - Legs 1   Pain Rating: FLACC (Rest) - Activity 1   Pain Rating: FLACC (Rest) - Cry 0   Pain Rating: FLACC (Rest) - Consolability 0   Score: FLACC (Rest) 2   Pain Rating: FLACC (Activity) - Face 2   Pain Rating: FLACC (Activity) - Legs 1   Pain Rating: FLACC (Activity) - Activity 1   Pain Rating: FLACC (Activity) - Cry 1   Pain Rating: FLACC (Activity) - Consolability 1   Score: FLACC (Activity) 6   Restrictions/Precautions   Weight Bearing Precautions Per Order No   Other Precautions Cognitive; Chair Alarm; Bed Alarm;Multiple lines; Fall Risk   ADL   Where Assessed Edge of bed   Grooming Assistance 4  Minimal Assistance   Grooming Deficit Setup;Verbal cueing; Increased time to complete;Supervision/safety; Wash/dry hands; Wash/dry face; Teeth care   Grooming Comments difficulty initiating brushing teeth; requires assistance putting toothpaste on toothbrush   LB Dressing Assistance 3  Moderate Assistance   LB Dressing Deficit Setup; Requires assistive device for steadying;Steadying; Impulsive;Verbal cueing;Supervision/safety; Increased time to complete; Don/doff R sock; Don/doff L sock; Thread RLE into underwear; Thread LLE into underwear;Pull up over hips   LB Dressing Comments Pt able to initiate LB dressing today; able to don R/L sock with extra time in long sitting  Requires assistance with brief and steadying in standing  Bed Mobility   Supine to Sit 5  Supervision   Additional items HOB elevated; Bedrails; Increased time required;Verbal cues   Sit to Supine 4  Minimal assistance   Additional items Assist x 1;Bedrails; Increased time required; Impulsive;Verbal cues;LE management   Additional Comments Able to sit unsupported EOB to complete ADL tasks and dynamic sitting balance tasks   Transfers   Sit to Stand 5  Supervision   Additional items Assist x 1;Bedrails; Increased time required; Impulsive;Verbal cues; Other  (HHA)   Stand to Sit 5  Supervision   Additional items Assist x 1;Bedrails; Increased time required; Impulsive;Verbal cues; Other  (HHA)   Additional Comments standing tolerance, static standing with unilateral UE support   Functional Mobility   Functional Mobility 4  Minimal assistance   Additional Comments x2 HHA, 130ft with 2 standing breaks   Subjective   Subjective "I am feeling better"   Cognition   Overall Cognitive Status Impaired   Arousal/Participation Alert; Responsive; Cooperative   Attention Attends with cues to redirect   Orientation Level Oriented to person;Oriented to place   Memory Decreased recall of precautions;Decreased recall of recent events;Decreased short term memory;Decreased recall of biographical information   Following Commands Follows one step commands with increased time or repetition   Comments able to follow 1 step commands with 90% accuract, 2 step commands with 50% accuracy   Activity Tolerance   Activity Tolerance Patient tolerated treatment well;Patient limited by pain Medical Staff Made Aware RN, PT   Assessment   Assessment Patient participated in skilled OT session this date with interventions consisting of therapeutic activities, therapeutic exercises, neuro-reeducation, and self-care/ADL training to increase B UE strength, functional endurance/activity tolerance, static/dynamic sitting/standing balance, and overall safety awareness to increase (I) with ADLs/IADLs to return to PLOF  Provided education on HEP, energy conservation techniques, deep breathing techniques, fall prevention strategies, and overall safety awareness  Patient agreeable to OT treatment session, upon arrival patient was found supine in bed  Patient requiring verbal cues for safety and verbal cues for pacing through activity steps  Refer to flowsheet for functional performance  Pt demonstrates improvement towards POC  Updated POC/goals  Patient continues to be functioning below baseline level, occupational performance remains limited secondary to factors listed above and increased risk for falls and injury  The patient's raw score on the AM-PAC Daily Activity inpatient short form is 12, standardized score is 30 6, less than 39 4  Patients at this level are likely to benefit from discharge to post-acute rehabilitation services  Please refer to the recommendation of the Occupational Therapist for safe discharge planning  Plan   Treatment Interventions ADL retraining;Functional transfer training;UE strengthening/ROM; Endurance training;Cognitive reorientation;Patient/family training;Equipment evaluation/education; Fine motor coordination activities; Compensatory technique education;Continued evaluation; Energy conservation; Activityengagement   Goal Expiration Date 10/31/22   OT Treatment Day 8   OT Frequency 3-5x/wk   Recommendation   OT Discharge Recommendation Post acute rehabilitation services   [facility] Daily Activity Inpatient   Lower Body Dressing 2   Bathing 2   Toileting 1   Upper Body Dressing 2 Grooming 2   Eating 3   Daily Activity Raw Score 12   Daily Activity Standardized Score (Calc for Raw Score >=11) 30 6   AM-PAC Applied Cognition Inpatient   Following a Speech/Presentation 3   Understanding Ordinary Conversation 3   Taking Medications 1   Remembering Where Things Are Placed or Put Away 1   Remembering List of 4-5 Errands 1   Taking Care of Complicated Tasks 1   Applied Cognition Raw Score 10   Applied Cognition Standardized Score 24 98     Occupational Therapy goals: In 7-14 days:     1- Pt will complete bed mobility at a Mod I level w/ G balance/safety demonstrated to decrease caregiver assistance required     2- Patient will increase standing tolerance time to 5 minutes with unilateral UE support to complete sink level ADLs@ mod I level      3- Patient will increase sitting tolerance at edge of bed to 20 minutes to complete UB ADLs @ set up assist level       4- Pt will improve functional transfers to Mod I on/off all surfaces using DME as needed w/ G balance/safety     5- Patient will complete UB ADLs with Roxann utilizing appropriate DME/AE PRN     6- Patient will complete LB ADLs with Roxann utilizing appropriate DME/AE PRN     7- Patient will complete toileting hygiene with Roxann with G hygiene/thoroughness utilizing appropriate DME/AE PRN     8- Pt will improve functional mobility during ADL/IADL/leisure tasks to Mod I using DME as needed w/ G balance/safety      9- Pt will be attentive 100% of the time during ongoing cognitive assessment w/ G participation to assist w/ safe d/c planning/recommendations     10- Pt will increase BUE strength by 1MM grade via AROM/AAROM/PROM exercises to increase independence in ADLs and transfers     11- Pt will follow 100% simple one step verbal commands and be A/Ox4 consistently t/o use of external environmental cues w/ mod I       Ana Torres, OTR/L

## 2022-10-17 NOTE — UTILIZATION REVIEW
Continued Stay Review    Date: 10/17/22                Current Patient Class: IP  Current Level of Care: MS    HPI:52 y o  female initially admitted on 9/19 , MSSA Bacteremia, evolving sepsis metabolic issues, medication effect, and possible Wernicke's encephalopathy  Psychiatric issues also likely playing a role    Assessment/Plan:   Remains on IV antibiotics through 10/25 for MSSA bacteremia  Pt is more interactive but not offering details  No c/o pain or difficulty breathing,  Remains on continual observation  Continues to make slow recovery,  Has short attention span, answers questions with very short answers  Another MRI Brain ordered today ( they do want one in 3 months)  Vital Signs:   10/17/22 13:56:47 -- 91 20 128/77 94 97 % --   10/17/22 06:59:16 97 7 °F (36 5 °C) 99 20 136/79 98 95 % --   10/16/22 22:10:06 98 3 °F (36 8 °C) 97 18 132/85 101 97 % --   10/16/22 2208 -- -- -- 132/85 -- -- --   10/16/22 2000 -- -- -- -- -- 96 % None (Room air)   10/16/22 14:47:02 97 8 °F (36 6 °C) 112 Abnormal  15 119/82 94 96 % --   10/16/22 07:15:34 97 9 °F (36 6 °C) 104 18 128/89 102 98 % --   10/15/22 2201 -- 116 Abnormal  -- 117/81 -- -- --   10/15/22 1945 -- -- -- -- -- 96 % None (Room air)   10/15/22 14:37:13 97 8 °F (36 6 °C) 101 16 128/92 104 98 % --   10/15/22 06:58:26 96 7 °F (35 9 °C) Abnormal  97 16 110/89 96 96 % --   10/15/22 06:55:32 96 7 °F (35 9 °C) Abnormal  89 16 110/89 96 98 % --     Pertinent Labs/Diagnostic Results:     10/14 MRI Brain - No acute infarct seen   Development of severe periventricular and white matter T2 hyperintensities in the supratentorial region and in the lavelle without any significant mass effect, enhancement or hemorrhage,Correlate with toxic encephalopathy    Follow-up at 3 months suggested to demonstrate resolution  No acute hemorrhage seen  No enhancing intraparenchymal lesion seen        Results from last 7 days   Lab Units 10/13/22  0504   WBC Thousand/uL 9 99   HEMOGLOBIN g/dL 9 8*   HEMATOCRIT % 30 8*   PLATELETS Thousands/uL 266   NEUTROS ABS Thousands/µL 7 22         Results from last 7 days   Lab Units 10/13/22  0504   SODIUM mmol/L 138   POTASSIUM mmol/L 4 1   CHLORIDE mmol/L 103   CO2 mmol/L 26   ANION GAP mmol/L 9   BUN mg/dL 18   CREATININE mg/dL 1 18   EGFR ml/min/1 73sq m 52   CALCIUM mg/dL 10 5*   PHOSPHORUS mg/dL 4 8*     Results from last 7 days   Lab Units 10/13/22  0504   AST U/L 24   ALT U/L 9*   ALK PHOS U/L 93   TOTAL PROTEIN g/dL 6 6   ALBUMIN g/dL 2 5*   TOTAL BILIRUBIN mg/dL 0 24         Results from last 7 days   Lab Units 10/13/22  0504   GLUCOSE RANDOM mg/dL 109     Medications:   Scheduled Medications:  amLODIPine, 10 mg, Oral, Daily  cefazolin, 2,000 mg, Intravenous, Q8H  gabapentin, 300 mg, Oral, TID  heparin (porcine), 5,000 Units, Subcutaneous, Q8H CHOCO  metoprolol tartrate, 25 mg, Oral, Q12H [address]  VANDANA ANTIFUNGAL, , Topical, BID  nicotine, 1 patch, Transdermal, Daily  QUEtiapine, 50 mg, Oral, BID  saccharomyces boulardii, 250 mg, Oral, BID  venlafaxine, 150 mg, Oral, Daily      Continuous IV Infusions:     PRN Meds:  acetaminophen, 650 mg, Oral, Q6H PRN  ALPRAZolam, 0 5 mg, Oral, HS PRN - x 1 10/15, 10/16  HYDROmorphone, 0 5 mg, Intravenous, Q4H PRN -x 1 10/15  levalbuterol, 1 25 mg, Nebulization, Q4H PRN  loperamide, 2 mg, Oral, 4x Daily PRNx 1 10/45  OLANZapine, 5 mg, Oral, Q6H PRN - x 1 10/15, 10/16  ondansetron, 4 mg, Intravenous, Q4H PRN    Discharge Plan: [facility]    Network Utilization Review Department  ATTENTION: Please call with any questions or concerns to [phone] and carefully listen to the prompts so that you are directed to the right person  All voicemails are confidential   Jack Madsen all requests for admission clinical reviews, approved or denied determinations and any other requests to dedicated fax number below belonging to the campus where the patient is receiving treatment   List of dedicated fax numbers for the Facilities:  [address] FAX NUMBER   ADMISSION DENIALS (Administrative/Medical Necessity) [phone]   [address] (Maternity/NICU/Pediatrics) Aleena Younger [address] Nicolasa Davila  [phone]   [address] [place] [address] Wanda Bowman [facility] [address] [facility] [address] [phone]

## 2022-10-17 NOTE — ASSESSMENT & PLAN NOTE
MSSA bacteremia, repeat cultures neg for greater than 72 hours  Echo with no vegetation  On IV ancef through 10/25

## 2022-10-17 NOTE — ASSESSMENT & PLAN NOTE
· ANUJA/ATN secondary to rhabdomyolysis    No evidence of renal obstruction on imaging    Kidney function has returned to normal    Results from last 7 days   Lab Units 10/13/22  0504   BUN mg/dL 18   CREATININE mg/dL 1 18   EGFR ml/min/1 73sq m 52

## 2022-10-17 NOTE — PLAN OF CARE
Problem: PAIN - ADULT  Goal: Verbalizes/displays adequate comfort level or baseline comfort level  Description: Interventions:  - Encourage patient to monitor pain and request assistance  - Assess pain using appropriate pain scale  - Administer analgesics based on type and severity of pain and evaluate response  - Implement non-pharmacological measures as appropriate and evaluate response  - Consider cultural and social influences on pain and pain management  - Notify physician/advanced practitioner if interventions unsuccessful or patient reports new pain  Outcome: Progressing     Problem: INFECTION - ADULT  Goal: Absence or prevention of progression during hospitalization  Description: INTERVENTIONS:  - Assess and monitor for signs and symptoms of infection  - Monitor lab/diagnostic results  - Monitor all insertion sites, i e  indwelling lines, tubes, and drains  - Monitor endotracheal if appropriate and nasal secretions for changes in amount and color  - [place] appropriate cooling/warming therapies per order  - Administer medications as ordered  - Instruct and encourage patient and family to use good hand hygiene technique  - Identify and instruct in appropriate isolation precautions for identified infection/condition  Outcome: Progressing     Problem: SAFETY ADULT  Goal: Patient will remain free of falls  Description: INTERVENTIONS:  - Educate patient/family on patient safety including physical limitations  - Instruct patient to call for assistance with activity   - Consult OT/PT to assist with strengthening/mobility   - Keep Call bell within reach  - Keep bed low and locked with side rails adjusted as appropriate  - Keep care items and personal belongings within reach  - Initiate and maintain comfort rounds  - Make Fall Risk Sign visible to staff  - Offer Toileting every 2 Hours, in advance of need  - Initiate/Maintain bed alarm  - Obtain necessary fall risk management equipment: walker  - Apply yellow socks and bracelet for high fall risk patients  - Consider moving patient to room near nurses station  Outcome: Progressing  Goal: Maintain or return to baseline ADL function  Description: INTERVENTIONS:  -  Assess patient's ability to carry out ADLs; assess patient's baseline for ADL function and identify physical deficits which impact ability to perform ADLs (bathing, care of mouth/teeth, toileting, grooming, dressing, etc )  - Assess/evaluate cause of self-care deficits   - Assess range of motion  - Assess patient's mobility; develop plan if impaired  - Assess patient's need for assistive devices and provide as appropriate  - Encourage maximum independence but intervene and supervise when necessary  - Involve family in performance of ADLs  - Assess for home care needs following discharge   - Consider OT consult to assist with ADL evaluation and planning for discharge  - Provide patient education as appropriate  Outcome: Progressing  Goal: Maintains/Returns to pre admission functional level  Description: INTERVENTIONS:  - Perform BMAT or MOVE assessment daily    - Set and communicate daily mobility goal to care team and patient/family/caregiver  - Collaborate with rehabilitation services on mobility goals if consulted  - Perform Range of Motion 4-6 times a day  - Reposition patient every 2 hours    - Dangle patient 3-4 times a day  - Stand patient 3 times a day  - Ambulate patient 3-4 times a day  - Out of bed for toileting  - Record patient progress and toleration of activity level   Outcome: Progressing     Problem: DISCHARGE PLANNING  Goal: Discharge to home or other facility with appropriate resources  Description: INTERVENTIONS:  - Identify barriers to discharge w/patient and caregiver  - Arrange for needed discharge resources and transportation as appropriate  - Identify discharge learning needs (meds, wound care, etc )  - Arrange for interpretive services to assist at discharge as needed  - Refer to Case Management Department for coordinating discharge planning if the patient needs post-hospital services based on physician/advanced practitioner order or complex needs related to functional status, cognitive ability, or social support system  Outcome: Progressing     Problem: Knowledge Deficit  Goal: Patient/family/caregiver demonstrates understanding of disease process, treatment plan, medications, and discharge instructions  Description: Complete learning assessment and assess knowledge base    Interventions:  - Provide teaching at level of understanding  - Provide teaching via preferred learning methods  Outcome: Progressing     Problem: Potential for Falls  Goal: Patient will remain free of falls  Description: INTERVENTIONS:  - Educate patient/family on patient safety including physical limitations  - Instruct patient to call for assistance with activity   - Consult OT/PT to assist with strengthening/mobility   - Keep Call bell within reach  - Keep bed low and locked with side rails adjusted as appropriate  - Keep care items and personal belongings within reach  - Initiate and maintain comfort rounds  - Make Fall Risk Sign visible to staff  - Offer Toileting every 2 Hours, in advance of need  - Initiate/Maintain bed alarm  - Obtain necessary fall risk management equipment: walker  - Apply yellow socks and bracelet for high fall risk patients  - Consider moving patient to room near nurses station  Outcome: Progressing     Problem: MOBILITY - ADULT  Goal: Maintain or return to baseline ADL function  Description: INTERVENTIONS:  -  Assess patient's ability to carry out ADLs; assess patient's baseline for ADL function and identify physical deficits which impact ability to perform ADLs (bathing, care of mouth/teeth, toileting, grooming, dressing, etc )  - Assess/evaluate cause of self-care deficits   - Assess range of motion  - Assess patient's mobility; develop plan if impaired  - Assess patient's need for assistive devices and provide as appropriate  - Encourage maximum independence but intervene and supervise when necessary  - Involve family in performance of ADLs  - Assess for home care needs following discharge   - Consider OT consult to assist with ADL evaluation and planning for discharge  - Provide patient education as appropriate  Outcome: Progressing  Goal: Maintains/Returns to pre admission functional level  Description: INTERVENTIONS:  - Perform BMAT or MOVE assessment daily    - Set and communicate daily mobility goal to care team and patient/family/caregiver  - Collaborate with rehabilitation services on mobility goals if consulted  - Perform Range of Motion 4-6 times a day  - Reposition patient every 2 hours  - Dangle patient 3-4 times a day  - Stand patient 3 times a day  - Ambulate patient 3-4 times a day  - Out of bed for toileting  - Record patient progress and toleration of activity level   Outcome: Progressing     Problem: Prexisting or High Potential for Compromised Skin Integrity  Goal: Skin integrity is maintained or improved  Description: INTERVENTIONS:  - Identify patients at risk for skin breakdown  - Assess and monitor skin integrity  - Assess and monitor nutrition and hydration status  - Monitor labs   - Assess for incontinence   - Turn and reposition patient  - Assist with mobility/ambulation  - Relieve pressure over bony prominences  - Avoid friction and shearing  - Provide appropriate hygiene as needed including keeping skin clean and dry  - Evaluate need for skin moisturizer/barrier cream  - Collaborate with interdisciplinary team   - Patient/family teaching  - Consider wound care consult   Outcome: Progressing     Problem: Nutrition/Hydration-ADULT  Goal: Nutrient/Hydration intake appropriate for improving, restoring or maintaining nutritional needs  Description: Monitor and assess patient's nutrition/hydration status for malnutrition   Collaborate with interdisciplinary team and initiate plan and interventions as ordered  Monitor patient's weight and dietary intake as ordered or per policy  Utilize nutrition screening tool and intervene as necessary  Determine patient's food preferences and provide high-protein, high-caloric foods as appropriate  INTERVENTIONS:  - Monitor oral intake, urinary output, labs, and treatment plans  - Assess nutrition and hydration status and recommend course of action  - Evaluate amount of meals eaten  - Assist patient with eating if necessary   - Allow adequate time for meals  - Recommend/ encourage appropriate diets, oral nutritional supplements, and vitamin/mineral supplements  - Order, calculate, and assess calorie counts as needed  - Recommend, monitor, and adjust tube feedings and TPN/PPN based on assessed needs  - Assess need for intravenous fluids  - Provide specific nutrition/hydration education as appropriate  - Include patient/family/caregiver in decisions related to nutrition  Outcome: Progressing     Problem: NEUROSENSORY - ADULT  Goal: Achieves maximal functionality and self care  Description: INTERVENTIONS  - Monitor swallowing and airway patency with patient fatigue and changes in neurological status  - Encourage and assist patient to increase activity and self care     - Encourage visually impaired, hearing impaired and aphasic patients to use assistive/communication devices  Outcome: Progressing  Goal: Achieves stable or improved neurological status  Description: INTERVENTIONS  - Monitor and report changes in neurological status  - Monitor vital signs such as temperature, blood pressure, glucose, and any other labs ordered   - Initiate measures to prevent increased intracranial pressure  - Monitor for seizure activity and implement precautions if appropriate      Outcome: Progressing     Problem: CARDIOVASCULAR - ADULT  Goal: Maintains optimal cardiac output and hemodynamic stability  Description: INTERVENTIONS:  - Monitor I/O, vital signs and rhythm  - Monitor for S/S and trends of decreased cardiac output  - Administer and titrate ordered vasoactive medications to optimize hemodynamic stability  - Assess quality of pulses, skin color and temperature  - Assess for signs of decreased coronary artery perfusion  - Instruct patient to report change in severity of symptoms  Outcome: Progressing     Problem: RESPIRATORY - ADULT  Goal: Achieves optimal ventilation and oxygenation  Description: INTERVENTIONS:  - Assess for changes in respiratory status  - Assess for changes in mentation and behavior  - Position to facilitate oxygenation and minimize respiratory effort  - Oxygen administered by appropriate delivery if ordered  - Initiate smoking cessation education as indicated  - Encourage broncho-pulmonary hygiene including cough, deep breathe, Incentive Spirometry  - Assess the need for suctioning and aspirate as needed  - Assess and instruct to report SOB or any respiratory difficulty  - Respiratory Therapy support as indicated  Outcome: Progressing     Problem: GASTROINTESTINAL - ADULT  Goal: Maintains adequate nutritional intake  Description: INTERVENTIONS:  - Monitor percentage of each meal consumed  - Identify factors contributing to decreased intake, treat as appropriate  - Assist with meals as needed  - Monitor I&O, weight, and lab values if indicated  - Obtain nutrition services referral as needed  Outcome: Progressing     Problem: METABOLIC, FLUID AND ELECTROLYTES - ADULT  Goal: Electrolytes maintained within normal limits  Description: INTERVENTIONS:  - Monitor labs and assess patient for signs and symptoms of electrolyte imbalances  - Administer electrolyte replacement as ordered  - Monitor response to electrolyte replacements, including repeat lab results as appropriate  - Instruct patient on fluid and nutrition as appropriate  Outcome: Progressing  Goal: Fluid balance maintained  Description: INTERVENTIONS:  - Monitor labs   - Monitor I/O and WT  - Instruct patient on fluid and nutrition as appropriate  - Assess for signs & symptoms of volume excess or deficit  Outcome: Progressing     Problem: SKIN/TISSUE INTEGRITY - ADULT  Goal: Skin Integrity remains intact(Skin Breakdown Prevention)  Description: Assess:  -Perform Erickson assessment every shift  -Clean and moisturize skin every day  -Inspect skin when repositioning, toileting, and assisting with ADLS  -Assess extremities for adequate circulation and sensation     Bed Management:  -Have minimal linens on bed & keep smooth, unwrinkled  -Change linens as needed when moist or perspiring  -Avoid sitting or lying in one position for more than 2 hours while in bed  -Keep HOB at 45 degrees     Toileting:  -Offer bedside commode  -Assess for incontinence every 2 hours  -Use incontinent care products after each incontinent episode such as brief    Activity:  -Mobilize patient 3-4 times a day  -Encourage activity and walks on unit  -Encourage or provide ROM exercises   -Turn and reposition patient every 2 Hours  -Use appropriate equipment to lift or move patient in bed  -Instruct/ Assist with weight shifting every 2 when out of bed in chair  -Consider limitation of chair time 2 hour intervals    Skin Care:  -Avoid use of baby powder, tape, friction and shearing, hot water or constrictive clothing  -Relieve pressure over bony prominences using pillows  -Do not massage red bony areas    Next Steps:  -Teach patient strategies to minimize risks such as walker  -Consider consults to  interdisciplinary teams such as wound  Outcome: Progressing  Goal: Incision(s), wounds(s) or drain site(s) healing without S/S of infection  Description: INTERVENTIONS  - Assess and document dressing, incision, wound bed, drain sites and surrounding tissue  - Provide patient and family education  - Perform skin care/dressing changes every PRN  Outcome: Progressing     Problem: MUSCULOSKELETAL - ADULT  Goal: Maintain or return mobility to safest level of function  Description: INTERVENTIONS:  - Assess patient's ability to carry out ADLs; assess patient's baseline for ADL function and identify physical deficits which impact ability to perform ADLs (bathing, care of mouth/teeth, toileting, grooming, dressing, etc )  - Assess/evaluate cause of self-care deficits   - Assess range of motion  - Assess patient's mobility  - Assess patient's need for assistive devices and provide as appropriate  - Encourage maximum independence but intervene and supervise when necessary  - Involve family in performance of ADLs  - Assess for home care needs following discharge   - Consider OT consult to assist with ADL evaluation and planning for discharge  - Provide patient education as appropriate  Outcome: Progressing

## 2022-10-18 LAB
ANION GAP SERPL CALCULATED.3IONS-SCNC: 9 MMOL/L (ref 4–13)
BASOPHILS # BLD AUTO: 0.08 THOUSANDS/ÂΜL (ref 0–0.1)
BASOPHILS NFR BLD AUTO: 1 % (ref 0–1)
BUN SERPL-MCNC: 12 MG/DL (ref 5–25)
CALCIUM SERPL-MCNC: 9.7 MG/DL (ref 8.3–10.1)
CHLORIDE SERPL-SCNC: 102 MMOL/L (ref 96–108)
CO2 SERPL-SCNC: 28 MMOL/L (ref 21–32)
CREAT SERPL-MCNC: 1.02 MG/DL (ref 0.6–1.3)
EOSINOPHIL # BLD AUTO: 0.12 THOUSAND/ÂΜL (ref 0–0.61)
EOSINOPHIL NFR BLD AUTO: 1 % (ref 0–6)
ERYTHROCYTE [DISTWIDTH] IN BLOOD BY AUTOMATED COUNT: 13.7 % (ref 11.6–15.1)
GFR SERPL CREATININE-BSD FRML MDRD: 62 ML/MIN/1.73SQ M
GLUCOSE SERPL-MCNC: 91 MG/DL (ref 65–140)
HCT VFR BLD AUTO: 32.5 % (ref 34.8–46.1)
HGB BLD-MCNC: 10.3 G/DL (ref 11.5–15.4)
IMM GRANULOCYTES # BLD AUTO: 0.04 THOUSAND/UL (ref 0–0.2)
IMM GRANULOCYTES NFR BLD AUTO: 0 % (ref 0–2)
LYMPHOCYTES # BLD AUTO: 2.32 THOUSANDS/ÂΜL (ref 0.6–4.47)
LYMPHOCYTES NFR BLD AUTO: 26 % (ref 14–44)
MAGNESIUM SERPL-MCNC: 1.4 MG/DL (ref 1.6–2.6)
MCH RBC QN AUTO: 32 PG (ref 26.8–34.3)
MCHC RBC AUTO-ENTMCNC: 31.7 G/DL (ref 31.4–37.4)
MCV RBC AUTO: 101 FL (ref 82–98)
MONOCYTES # BLD AUTO: 0.46 THOUSAND/ÂΜL (ref 0.17–1.22)
MONOCYTES NFR BLD AUTO: 5 % (ref 4–12)
NEUTROPHILS # BLD AUTO: 6.08 THOUSANDS/ÂΜL (ref 1.85–7.62)
NEUTS SEG NFR BLD AUTO: 67 % (ref 43–75)
NRBC BLD AUTO-RTO: 0 /100 WBCS
PLATELET # BLD AUTO: 357 THOUSANDS/UL (ref 149–390)
PMV BLD AUTO: 11.5 FL (ref 8.9–12.7)
POTASSIUM SERPL-SCNC: 3.3 MMOL/L (ref 3.5–5.3)
RBC # BLD AUTO: 3.22 MILLION/UL (ref 3.81–5.12)
SODIUM SERPL-SCNC: 139 MMOL/L (ref 135–147)
WBC # BLD AUTO: 9.1 THOUSAND/UL (ref 4.31–10.16)

## 2022-10-18 RX ADMIN — Medication 250 MG: at 09:01

## 2022-10-18 RX ADMIN — CEFAZOLIN SODIUM 2000 MG: 2 SOLUTION INTRAVENOUS at 21:14

## 2022-10-18 RX ADMIN — CEFAZOLIN SODIUM 2000 MG: 2 SOLUTION INTRAVENOUS at 13:27

## 2022-10-18 RX ADMIN — GABAPENTIN 300 MG: 300 CAPSULE ORAL at 16:40

## 2022-10-18 RX ADMIN — OLANZAPINE 5 MG: 5 TABLET, ORALLY DISINTEGRATING ORAL at 09:01

## 2022-10-18 RX ADMIN — METOPROLOL TARTRATE 25 MG: 25 TABLET, FILM COATED ORAL at 09:01

## 2022-10-18 RX ADMIN — AMLODIPINE BESYLATE 10 MG: 10 TABLET ORAL at 09:01

## 2022-10-18 RX ADMIN — QUETIAPINE FUMARATE 50 MG: 25 TABLET ORAL at 09:01

## 2022-10-18 RX ADMIN — METOPROLOL TARTRATE 25 MG: 25 TABLET, FILM COATED ORAL at 21:14

## 2022-10-18 RX ADMIN — VENLAFAXINE HYDROCHLORIDE 150 MG: 150 CAPSULE, EXTENDED RELEASE ORAL at 09:02

## 2022-10-18 RX ADMIN — HEPARIN SODIUM 5000 UNITS: 5000 INJECTION INTRAVENOUS; SUBCUTANEOUS at 05:20

## 2022-10-18 RX ADMIN — Medication 250 MG: at 17:03

## 2022-10-18 RX ADMIN — CEFAZOLIN SODIUM 2000 MG: 2 SOLUTION INTRAVENOUS at 05:22

## 2022-10-18 RX ADMIN — GABAPENTIN 300 MG: 300 CAPSULE ORAL at 09:01

## 2022-10-18 RX ADMIN — HEPARIN SODIUM 5000 UNITS: 5000 INJECTION INTRAVENOUS; SUBCUTANEOUS at 21:14

## 2022-10-18 RX ADMIN — QUETIAPINE FUMARATE 50 MG: 25 TABLET ORAL at 17:03

## 2022-10-18 RX ADMIN — MICONAZOLE NITRATE: 20 CREAM TOPICAL at 09:02

## 2022-10-18 RX ADMIN — GABAPENTIN 300 MG: 300 CAPSULE ORAL at 21:14

## 2022-10-18 RX ADMIN — HEPARIN SODIUM 5000 UNITS: 5000 INJECTION INTRAVENOUS; SUBCUTANEOUS at 13:27

## 2022-10-18 RX ADMIN — MICONAZOLE NITRATE: 20 CREAM TOPICAL at 17:04

## 2022-10-18 NOTE — DISCHARGE INSTRUCTIONS
FACILITY RN TO REMOVE PICC AFTER FINAL DOSE OF IV ANTIBIOTIC  Weekly CBCD and creatinine while on IV antibiotic  Complete surveillance blood cultures x2 sets 2 weeks after finishing IV antibiotic treatment, draw after 11/8/2022  You will be set up with an appointment in the outpatient Infectious Disease office  It is important for you to keep this appointment in order for us to monitor you while you are on IV antibiotics    This will include evaluating your lab work, examining your PICC line, and making sure you are not having toxicities or side effects of the medication(s) you are receiving    ------------------------------------------------------------------------------------------------------------

## 2022-10-18 NOTE — SPEECH THERAPY NOTE
Speech Language/Pathology    Speech/Language Pathology Progress Note    Patient Name: Keaton Rebolledo  Today's Date: 10/18/2022     Problem List  Principal Problem:    Toxic metabolic encephalopathy  Active Problems:    Depression    Tobacco abuse    DDD (degenerative disc disease), lumbosacral    ARF (acute renal failure) (HCC)    Transaminitis    Abnormal CT of the chest    Traumatic rhabdomyolysis (HCC)    D-dimer, elevated    Leg edema, right    Localized swelling of right upper extremity    Bacteremia due to methicillin susceptible Staphylococcus aureus (MSSA)    Pulmonary edema    Aspiration pneumonitis (HCC)    Hypokalemia    Hypernatremia    Diarrhea    Mild protein-calorie malnutrition (HCC)       Past Medical History  Past Medical History:   Diagnosis Date   • Chronic pain disorder    • Depression    • GERD (gastroesophageal reflux disease)    • History of electroconvulsive therapy    • Low back pain    • Self-injurious behavior    • Suicide attempt [facility])         Past Surgical History  Past Surgical History:   Procedure Laterality Date   •  SECTION     • COLONOSCOPY     • PANCREAS SURGERY      "pseudocysts" per patient's  Ricki Infante   • [place] ESOPHAGOGASTRODUODENOSCOPY TRANSORAL DIAGNOSTIC N/A 4/10/2018    Procedure: EGD AND COLONOSCOPY;  Surgeon: Nicol Morris MD;  [place] [facility]; Service: Gastroenterology         Subjective:  Pt alert and feeding self cheeseburger at lunch  (refused breakfast)  Much more calm and appropriate  Objective:  Pt seen for f/u w/ regular diet at lunch  Much more agreeable  Had a cheeseburger and was feeding self  Mastication adequate  swallows prompt  No s/s aspiration w/ food or liquid  Assessment:  Tolerating regular diet when agreeable and fully alert  Plan/Recommendations:  D/c ST  Supervision at meals  Regular diet

## 2022-10-18 NOTE — PROGRESS NOTES
Progress Note - Infectious Disease   Vinie Setting 45 y o  female MRN: [id number]  Unit/Bed#: [place] [address] -01 Encounter: [id number]    Impression/Plan:  1  SIRS vs Sepsis  Evolving since admission  Fever, tachycardia, and mild leukocytosis  Suspect this is likely due to MSSA bacteremia  No other clear source appreciated  COVID-19 negative   UA benign   Status post lumbar puncture for altered mental status with negative ME panel   Patient has improved with no recurrent high fever spike  No lactic acidosis  This morning she is afebrile  Her WBC count has improved  Repeat blood cultures were negative >5 days    -antibiotics as below   -monitor CBCD and BMP  -monitor vitals  -supportive care      2  MSSA bacteremia  Patient was found down for prolonged period of time with evidence of multiple wounds on admission  Suspect cutaneous vs endovascular source, likely phlebitis as there is report of RUE erythema surrounding prior IV in addition to palpable cord in left antecub  TTE negative for obvious vegetation  CHERYLE was planned but then cancelled due to tenuous respiratory status  Repeat TTE still without good visualization of aortic valve  The patient is currently receiving IV cefazolin which she is tolerating without difficulty  Anticipate 4 weeks of IV antibiotic   If she transfers out of [facility] before completion of IV antibiotic treatment she will need PICC placed   -continue IV cefazolin through 10/25/2022 for 4 weeks of antibiotic treatment after cleared blood cultures  -weekly CBCD and creatinine while on IV antibiotic  -monitor vitals  -if patient transfers to rehab before completion of IV antibiotic treatment she will need a PICC placed  -patient will require outpatient ID follow up after discharge, I will coordinate as needed and place information in discharge planning  -patient will need surveillance blood cultures 2 weeks after finishing IV antibiotic treatment, to be drawn 11/8/2022     3  Toxic Metabolic encephalopathy   Likely multifactorial etiology with uremia playing a role  This was initially thought to be secondary to gabapentin and morphine use in the setting of renal failure, hypotension, rhabdomyolysis   MRI brain negative   Lumbar puncture with opening pressure 26  Negative ME panel  CSF fluid culture not collected   The most likely to be secondary to metabolic issues, medication effect, and possible Wernicke's encephalopathy  Psychiatric issues also likely playing a role  Neurology has reassessed patient  Mental status remains significantly altered  She is being considered for [facility] brain injury unit   -serial neuro exams  -monitor cognition, mood, and mental status  -treat metabolic issues and psychiatric issues  -supportive care  -continue follow up with behavioral health  -continue follow up with neurology   -no additional ID workup needed     4  Acute renal failure   Creatinine on admission 5 88 with CK over 32,000  Creatinine reached plateau, now down trending   Likely multifactorial etiology in the setting of rhabdomyolysis and dehydration  Likely ANUJA/ATN    Most recent creatinine was improved to 1 02   -monitor creatinine  -dose adjust antibiotics for renal function as needed  -avoid nephrotoxins  -continue follow up with nephrology     5  Tobacco abuse   Encourage cessation as recommended by primary team   -NRT per primary service     6  Antibiotic allergy  Reports hives with penicillin  Patient has been tolerating cephalosporin without difficulty  -monitor patient for adverse medication reactions     Above plan was discussed in detail with patient at the bedside  Above plan was discussed in detail with SLIM  Antibiotics:  Cefazolin 21  Antibiotics 21    Subjective:  Patient remains on 1:1 observation today  Is more interactive but is still not answering most questions appropriately  She denies chest pain and difficulty breathing  She offers no other symptoms   1:1 observer reports patient very compulsive again this morning, continues to try to climb out of bed but is very wobbly and ucoordinated when she walks  Objective:  Vitals:  Temp:  [96 7 °F (35 9 °C)-99 4 °F (37 4 °C)] 96 7 °F (35 9 °C)  HR:  [] 113  Resp:  [20] 20  BP: (121-128)/(76-80) 121/76  SpO2:  [96 %-97 %] 96 %  Temp (24hrs), Av 1 °F (36 7 °C), Min:96 7 °F (35 9 °C), Max:99 4 °F (37 4 °C)  Current: Temperature: (!) 96 7 °F (35 9 °C)    Physical Exam:   General Appearance:  Alert, interactive but confused, nontoxic, no acute distress  She appears comfortable sitting up in bed  Throat: Oropharynx moist without lesions  Lungs:   Clear to auscultation bilaterally; no wheezes, rhonchi or rales; respirations unlabored on room air  Heart:  Tachycardic; no murmur, rub or gallop  Abdomen:   Soft, non-tender, non-distended, positive bowel sounds  Extremities: No clubbing or cyanosis, no edema  Skin: No new rashes, lesions, or draining wounds noted on exposed skin       Labs, Imaging, & Other studies:   All pertinent labs and imaging studies were personally reviewed  Results from last 7 days   Lab Units 10/18/22  0436 10/13/22  0504   WBC Thousand/uL 9 10 9 99   HEMOGLOBIN g/dL 10 3* 9 8*   PLATELETS Thousands/uL 357 266     Results from last 7 days   Lab Units 10/18/22  0436 10/13/22  0504   POTASSIUM mmol/L 3 3* 4 1   CHLORIDE mmol/L 102 103   CO2 mmol/L 28 26   BUN mg/dL 12 18   CREATININE mg/dL 1 02 1 18   EGFR ml/min/1 73sq m 62 52   CALCIUM mg/dL 9 7 10 5*   AST U/L  --  24   ALT U/L  --  9*   ALK PHOS U/L  --  93

## 2022-10-18 NOTE — PLAN OF CARE
Problem: PAIN - ADULT  Goal: Verbalizes/displays adequate comfort level or baseline comfort level  Description: Interventions:  - Encourage patient to monitor pain and request assistance  - Assess pain using appropriate pain scale  - Administer analgesics based on type and severity of pain and evaluate response  - Implement non-pharmacological measures as appropriate and evaluate response  - Consider cultural and social influences on pain and pain management  - Notify physician/advanced practitioner if interventions unsuccessful or patient reports new pain  Outcome: Progressing     Problem: INFECTION - ADULT  Goal: Absence or prevention of progression during hospitalization  Description: INTERVENTIONS:  - Assess and monitor for signs and symptoms of infection  - Monitor lab/diagnostic results  - Monitor all insertion sites, i e  indwelling lines, tubes, and drains  - Monitor endotracheal if appropriate and nasal secretions for changes in amount and color  - [place] appropriate cooling/warming therapies per order  - Administer medications as ordered  - Instruct and encourage patient and family to use good hand hygiene technique  - Identify and instruct in appropriate isolation precautions for identified infection/condition  Outcome: Progressing     Problem: SAFETY ADULT  Goal: Patient will remain free of falls  Description: INTERVENTIONS:  - Educate patient/family on patient safety including physical limitations  - Instruct patient to call for assistance with activity   - Consult OT/PT to assist with strengthening/mobility   - Keep Call bell within reach  - Keep bed low and locked with side rails adjusted as appropriate  - Keep care items and personal belongings within reach  - Initiate and maintain comfort rounds  - Make Fall Risk Sign visible to staff  - Offer Toileting every 2 Hours, in advance of need  - Initiate/Maintain bed alarm  - Obtain necessary fall risk management equipment: walker  - Apply yellow socks and bracelet for high fall risk patients  - Consider moving patient to room near nurses station  Outcome: Progressing  Goal: Maintain or return to baseline ADL function  Description: INTERVENTIONS:  -  Assess patient's ability to carry out ADLs; assess patient's baseline for ADL function and identify physical deficits which impact ability to perform ADLs (bathing, care of mouth/teeth, toileting, grooming, dressing, etc )  - Assess/evaluate cause of self-care deficits   - Assess range of motion  - Assess patient's mobility; develop plan if impaired  - Assess patient's need for assistive devices and provide as appropriate  - Encourage maximum independence but intervene and supervise when necessary  - Involve family in performance of ADLs  - Assess for home care needs following discharge   - Consider OT consult to assist with ADL evaluation and planning for discharge  - Provide patient education as appropriate  Outcome: Progressing  Goal: Maintains/Returns to pre admission functional level  Description: INTERVENTIONS:  - Perform BMAT or MOVE assessment daily    - Set and communicate daily mobility goal to care team and patient/family/caregiver  - Collaborate with rehabilitation services on mobility goals if consulted  - Perform Range of Motion 4-6 times a day  - Reposition patient every 2 hours    - Dangle patient 3-4 times a day  - Stand patient 3 times a day  - Ambulate patient 3-4 times a day  - Out of bed for toileting  - Record patient progress and toleration of activity level   Outcome: Progressing     Problem: DISCHARGE PLANNING  Goal: Discharge to home or other facility with appropriate resources  Description: INTERVENTIONS:  - Identify barriers to discharge w/patient and caregiver  - Arrange for needed discharge resources and transportation as appropriate  - Identify discharge learning needs (meds, wound care, etc )  - Arrange for interpretive services to assist at discharge as needed  - Refer to Case Management Department for coordinating discharge planning if the patient needs post-hospital services based on physician/advanced practitioner order or complex needs related to functional status, cognitive ability, or social support system  Outcome: Progressing     Problem: Knowledge Deficit  Goal: Patient/family/caregiver demonstrates understanding of disease process, treatment plan, medications, and discharge instructions  Description: Complete learning assessment and assess knowledge base    Interventions:  - Provide teaching at level of understanding  - Provide teaching via preferred learning methods  Outcome: Progressing     Problem: Potential for Falls  Goal: Patient will remain free of falls  Description: INTERVENTIONS:  - Educate patient/family on patient safety including physical limitations  - Instruct patient to call for assistance with activity   - Consult OT/PT to assist with strengthening/mobility   - Keep Call bell within reach  - Keep bed low and locked with side rails adjusted as appropriate  - Keep care items and personal belongings within reach  - Initiate and maintain comfort rounds  - Make Fall Risk Sign visible to staff  - Offer Toileting every 2 Hours, in advance of need  - Initiate/Maintain bed alarm  - Obtain necessary fall risk management equipment: walker  - Apply yellow socks and bracelet for high fall risk patients  - Consider moving patient to room near nurses station  Outcome: Progressing     Problem: MOBILITY - ADULT  Goal: Maintain or return to baseline ADL function  Description: INTERVENTIONS:  -  Assess patient's ability to carry out ADLs; assess patient's baseline for ADL function and identify physical deficits which impact ability to perform ADLs (bathing, care of mouth/teeth, toileting, grooming, dressing, etc )  - Assess/evaluate cause of self-care deficits   - Assess range of motion  - Assess patient's mobility; develop plan if impaired  - Assess patient's need for assistive devices and provide as appropriate  - Encourage maximum independence but intervene and supervise when necessary  - Involve family in performance of ADLs  - Assess for home care needs following discharge   - Consider OT consult to assist with ADL evaluation and planning for discharge  - Provide patient education as appropriate  Outcome: Progressing  Goal: Maintains/Returns to pre admission functional level  Description: INTERVENTIONS:  - Perform BMAT or MOVE assessment daily    - Set and communicate daily mobility goal to care team and patient/family/caregiver  - Collaborate with rehabilitation services on mobility goals if consulted  - Perform Range of Motion 4-6 times a day  - Reposition patient every 2 hours  - Dangle patient 3-4 times a day  - Stand patient 3 times a day  - Ambulate patient 3-4 times a day  - Out of bed for toileting  - Record patient progress and toleration of activity level   Outcome: Progressing     Problem: Prexisting or High Potential for Compromised Skin Integrity  Goal: Skin integrity is maintained or improved  Description: INTERVENTIONS:  - Identify patients at risk for skin breakdown  - Assess and monitor skin integrity  - Assess and monitor nutrition and hydration status  - Monitor labs   - Assess for incontinence   - Turn and reposition patient  - Assist with mobility/ambulation  - Relieve pressure over bony prominences  - Avoid friction and shearing  - Provide appropriate hygiene as needed including keeping skin clean and dry  - Evaluate need for skin moisturizer/barrier cream  - Collaborate with interdisciplinary team   - Patient/family teaching  - Consider wound care consult   Outcome: Progressing     Problem: Nutrition/Hydration-ADULT  Goal: Nutrient/Hydration intake appropriate for improving, restoring or maintaining nutritional needs  Description: Monitor and assess patient's nutrition/hydration status for malnutrition   Collaborate with interdisciplinary team and initiate plan and interventions as ordered  Monitor patient's weight and dietary intake as ordered or per policy  Utilize nutrition screening tool and intervene as necessary  Determine patient's food preferences and provide high-protein, high-caloric foods as appropriate  INTERVENTIONS:  - Monitor oral intake, urinary output, labs, and treatment plans  - Assess nutrition and hydration status and recommend course of action  - Evaluate amount of meals eaten  - Assist patient with eating if necessary   - Allow adequate time for meals  - Recommend/ encourage appropriate diets, oral nutritional supplements, and vitamin/mineral supplements  - Order, calculate, and assess calorie counts as needed  - Recommend, monitor, and adjust tube feedings and TPN/PPN based on assessed needs  - Assess need for intravenous fluids  - Provide specific nutrition/hydration education as appropriate  - Include patient/family/caregiver in decisions related to nutrition  Outcome: Progressing     Problem: NEUROSENSORY - ADULT  Goal: Achieves maximal functionality and self care  Description: INTERVENTIONS  - Monitor swallowing and airway patency with patient fatigue and changes in neurological status  - Encourage and assist patient to increase activity and self care     - Encourage visually impaired, hearing impaired and aphasic patients to use assistive/communication devices  Outcome: Progressing  Goal: Achieves stable or improved neurological status  Description: INTERVENTIONS  - Monitor and report changes in neurological status  - Monitor vital signs such as temperature, blood pressure, glucose, and any other labs ordered   - Initiate measures to prevent increased intracranial pressure  - Monitor for seizure activity and implement precautions if appropriate      Outcome: Progressing     Problem: CARDIOVASCULAR - ADULT  Goal: Maintains optimal cardiac output and hemodynamic stability  Description: INTERVENTIONS:  - Monitor I/O, vital signs and rhythm  - Monitor for S/S and trends of decreased cardiac output  - Administer and titrate ordered vasoactive medications to optimize hemodynamic stability  - Assess quality of pulses, skin color and temperature  - Assess for signs of decreased coronary artery perfusion  - Instruct patient to report change in severity of symptoms  Outcome: Progressing     Problem: RESPIRATORY - ADULT  Goal: Achieves optimal ventilation and oxygenation  Description: INTERVENTIONS:  - Assess for changes in respiratory status  - Assess for changes in mentation and behavior  - Position to facilitate oxygenation and minimize respiratory effort  - Oxygen administered by appropriate delivery if ordered  - Initiate smoking cessation education as indicated  - Encourage broncho-pulmonary hygiene including cough, deep breathe, Incentive Spirometry  - Assess the need for suctioning and aspirate as needed  - Assess and instruct to report SOB or any respiratory difficulty  - Respiratory Therapy support as indicated  Outcome: Progressing     Problem: GASTROINTESTINAL - ADULT  Goal: Maintains adequate nutritional intake  Description: INTERVENTIONS:  - Monitor percentage of each meal consumed  - Identify factors contributing to decreased intake, treat as appropriate  - Assist with meals as needed  - Monitor I&O, weight, and lab values if indicated  - Obtain nutrition services referral as needed  Outcome: Progressing     Problem: METABOLIC, FLUID AND ELECTROLYTES - ADULT  Goal: Electrolytes maintained within normal limits  Description: INTERVENTIONS:  - Monitor labs and assess patient for signs and symptoms of electrolyte imbalances  - Administer electrolyte replacement as ordered  - Monitor response to electrolyte replacements, including repeat lab results as appropriate  - Instruct patient on fluid and nutrition as appropriate  Outcome: Progressing  Goal: Fluid balance maintained  Description: INTERVENTIONS:  - Monitor labs   - Monitor I/O and WT  - Instruct patient on fluid and nutrition as appropriate  - Assess for signs & symptoms of volume excess or deficit  Outcome: Progressing     Problem: SKIN/TISSUE INTEGRITY - ADULT  Goal: Skin Integrity remains intact(Skin Breakdown Prevention)  Description: Assess:  -Perform Erickson assessment every shift  -Clean and moisturize skin every day  -Inspect skin when repositioning, toileting, and assisting with ADLS  -Assess extremities for adequate circulation and sensation     Bed Management:  -Have minimal linens on bed & keep smooth, unwrinkled  -Change linens as needed when moist or perspiring  -Avoid sitting or lying in one position for more than 2 hours while in bed  -Keep HOB at 45 degrees     Toileting:  -Offer bedside commode  -Assess for incontinence every 2 hours  -Use incontinent care products after each incontinent episode such as brief    Activity:  -Mobilize patient 3-4 times a day  -Encourage activity and walks on unit  -Encourage or provide ROM exercises   -Turn and reposition patient every 2 Hours  -Use appropriate equipment to lift or move patient in bed  -Instruct/ Assist with weight shifting every 2 when out of bed in chair  -Consider limitation of chair time 2 hour intervals    Skin Care:  -Avoid use of baby powder, tape, friction and shearing, hot water or constrictive clothing  -Relieve pressure over bony prominences using pillows  -Do not massage red bony areas    Next Steps:  -Teach patient strategies to minimize risks such as walker  -Consider consults to  interdisciplinary teams such as wound  Outcome: Progressing  Goal: Incision(s), wounds(s) or drain site(s) healing without S/S of infection  Description: INTERVENTIONS  - Assess and document dressing, incision, wound bed, drain sites and surrounding tissue  - Provide patient and family education  - Perform skin care/dressing changes every PRN  Outcome: Progressing     Problem: MUSCULOSKELETAL - ADULT  Goal: Maintain or return mobility to safest level of function  Description: INTERVENTIONS:  - Assess patient's ability to carry out ADLs; assess patient's baseline for ADL function and identify physical deficits which impact ability to perform ADLs (bathing, care of mouth/teeth, toileting, grooming, dressing, etc )  - Assess/evaluate cause of self-care deficits   - Assess range of motion  - Assess patient's mobility  - Assess patient's need for assistive devices and provide as appropriate  - Encourage maximum independence but intervene and supervise when necessary  - Involve family in performance of ADLs  - Assess for home care needs following discharge   - Consider OT consult to assist with ADL evaluation and planning for discharge  - Provide patient education as appropriate  Outcome: Progressing

## 2022-10-18 NOTE — PLAN OF CARE
Problem: PAIN - ADULT  Goal: Verbalizes/displays adequate comfort level or baseline comfort level  Description: Interventions:  - Encourage patient to monitor pain and request assistance  - Assess pain using appropriate pain scale  - Administer analgesics based on type and severity of pain and evaluate response  - Implement non-pharmacological measures as appropriate and evaluate response  - Consider cultural and social influences on pain and pain management  - Notify physician/advanced practitioner if interventions unsuccessful or patient reports new pain  Outcome: Progressing     Problem: INFECTION - ADULT  Goal: Absence or prevention of progression during hospitalization  Description: INTERVENTIONS:  - Assess and monitor for signs and symptoms of infection  - Monitor lab/diagnostic results  - Monitor all insertion sites, i e  indwelling lines, tubes, and drains  - Monitor endotracheal if appropriate and nasal secretions for changes in amount and color  - [place] appropriate cooling/warming therapies per order  - Administer medications as ordered  - Instruct and encourage patient and family to use good hand hygiene technique  - Identify and instruct in appropriate isolation precautions for identified infection/condition  Outcome: Progressing     Problem: SAFETY ADULT  Goal: Patient will remain free of falls  Description: INTERVENTIONS:  - Educate patient/family on patient safety including physical limitations  - Instruct patient to call for assistance with activity   - Consult OT/PT to assist with strengthening/mobility   - Keep Call bell within reach  - Keep bed low and locked with side rails adjusted as appropriate  - Keep care items and personal belongings within reach  - Initiate and maintain comfort rounds  - Make Fall Risk Sign visible to staff  - Offer Toileting every 2 Hours, in advance of need  - Initiate/Maintain bed alarm  - Obtain necessary fall risk management equipment: walker  - Apply yellow socks and bracelet for high fall risk patients  - Consider moving patient to room near nurses station  Outcome: Progressing  Goal: Maintain or return to baseline ADL function  Description: INTERVENTIONS:  -  Assess patient's ability to carry out ADLs; assess patient's baseline for ADL function and identify physical deficits which impact ability to perform ADLs (bathing, care of mouth/teeth, toileting, grooming, dressing, etc )  - Assess/evaluate cause of self-care deficits   - Assess range of motion  - Assess patient's mobility; develop plan if impaired  - Assess patient's need for assistive devices and provide as appropriate  - Encourage maximum independence but intervene and supervise when necessary  - Involve family in performance of ADLs  - Assess for home care needs following discharge   - Consider OT consult to assist with ADL evaluation and planning for discharge  - Provide patient education as appropriate  Outcome: Progressing  Goal: Maintains/Returns to pre admission functional level  Description: INTERVENTIONS:  - Perform BMAT or MOVE assessment daily    - Set and communicate daily mobility goal to care team and patient/family/caregiver  - Collaborate with rehabilitation services on mobility goals if consulted  - Perform Range of Motion 4-6 times a day  - Reposition patient every 2 hours    - Dangle patient 3-4 times a day  - Stand patient 3 times a day  - Ambulate patient 3-4 times a day  - Out of bed for toileting  - Record patient progress and toleration of activity level   Outcome: Progressing     Problem: DISCHARGE PLANNING  Goal: Discharge to home or other facility with appropriate resources  Description: INTERVENTIONS:  - Identify barriers to discharge w/patient and caregiver  - Arrange for needed discharge resources and transportation as appropriate  - Identify discharge learning needs (meds, wound care, etc )  - Arrange for interpretive services to assist at discharge as needed  - Refer to Case Management Department for coordinating discharge planning if the patient needs post-hospital services based on physician/advanced practitioner order or complex needs related to functional status, cognitive ability, or social support system  Outcome: Progressing     Problem: Knowledge Deficit  Goal: Patient/family/caregiver demonstrates understanding of disease process, treatment plan, medications, and discharge instructions  Description: Complete learning assessment and assess knowledge base    Interventions:  - Provide teaching at level of understanding  - Provide teaching via preferred learning methods  Outcome: Progressing     Problem: Potential for Falls  Goal: Patient will remain free of falls  Description: INTERVENTIONS:  - Educate patient/family on patient safety including physical limitations  - Instruct patient to call for assistance with activity   - Consult OT/PT to assist with strengthening/mobility   - Keep Call bell within reach  - Keep bed low and locked with side rails adjusted as appropriate  - Keep care items and personal belongings within reach  - Initiate and maintain comfort rounds  - Make Fall Risk Sign visible to staff  - Offer Toileting every 2 Hours, in advance of need  - Initiate/Maintain bed alarm  - Obtain necessary fall risk management equipment: walker  - Apply yellow socks and bracelet for high fall risk patients  - Consider moving patient to room near nurses station  Outcome: Progressing     Problem: MOBILITY - ADULT  Goal: Maintain or return to baseline ADL function  Description: INTERVENTIONS:  -  Assess patient's ability to carry out ADLs; assess patient's baseline for ADL function and identify physical deficits which impact ability to perform ADLs (bathing, care of mouth/teeth, toileting, grooming, dressing, etc )  - Assess/evaluate cause of self-care deficits   - Assess range of motion  - Assess patient's mobility; develop plan if impaired  - Assess patient's need for assistive devices and provide as appropriate  - Encourage maximum independence but intervene and supervise when necessary  - Involve family in performance of ADLs  - Assess for home care needs following discharge   - Consider OT consult to assist with ADL evaluation and planning for discharge  - Provide patient education as appropriate  Outcome: Progressing  Goal: Maintains/Returns to pre admission functional level  Description: INTERVENTIONS:  - Perform BMAT or MOVE assessment daily    - Set and communicate daily mobility goal to care team and patient/family/caregiver  - Collaborate with rehabilitation services on mobility goals if consulted  - Perform Range of Motion 4-6 times a day  - Reposition patient every 2 hours  - Dangle patient 3-4 times a day  - Stand patient 3 times a day  - Ambulate patient 3-4 times a day  - Out of bed for toileting  - Record patient progress and toleration of activity level   Outcome: Progressing     Problem: Prexisting or High Potential for Compromised Skin Integrity  Goal: Skin integrity is maintained or improved  Description: INTERVENTIONS:  - Identify patients at risk for skin breakdown  - Assess and monitor skin integrity  - Assess and monitor nutrition and hydration status  - Monitor labs   - Assess for incontinence   - Turn and reposition patient  - Assist with mobility/ambulation  - Relieve pressure over bony prominences  - Avoid friction and shearing  - Provide appropriate hygiene as needed including keeping skin clean and dry  - Evaluate need for skin moisturizer/barrier cream  - Collaborate with interdisciplinary team   - Patient/family teaching  - Consider wound care consult   Outcome: Progressing     Problem: Nutrition/Hydration-ADULT  Goal: Nutrient/Hydration intake appropriate for improving, restoring or maintaining nutritional needs  Description: Monitor and assess patient's nutrition/hydration status for malnutrition   Collaborate with interdisciplinary team and initiate plan and interventions as ordered  Monitor patient's weight and dietary intake as ordered or per policy  Utilize nutrition screening tool and intervene as necessary  Determine patient's food preferences and provide high-protein, high-caloric foods as appropriate  INTERVENTIONS:  - Monitor oral intake, urinary output, labs, and treatment plans  - Assess nutrition and hydration status and recommend course of action  - Evaluate amount of meals eaten  - Assist patient with eating if necessary   - Allow adequate time for meals  - Recommend/ encourage appropriate diets, oral nutritional supplements, and vitamin/mineral supplements  - Order, calculate, and assess calorie counts as needed  - Recommend, monitor, and adjust tube feedings and TPN/PPN based on assessed needs  - Assess need for intravenous fluids  - Provide specific nutrition/hydration education as appropriate  - Include patient/family/caregiver in decisions related to nutrition  Outcome: Progressing     Problem: NEUROSENSORY - ADULT  Goal: Achieves maximal functionality and self care  Description: INTERVENTIONS  - Monitor swallowing and airway patency with patient fatigue and changes in neurological status  - Encourage and assist patient to increase activity and self care     - Encourage visually impaired, hearing impaired and aphasic patients to use assistive/communication devices  Outcome: Progressing  Goal: Achieves stable or improved neurological status  Description: INTERVENTIONS  - Monitor and report changes in neurological status  - Monitor vital signs such as temperature, blood pressure, glucose, and any other labs ordered   - Initiate measures to prevent increased intracranial pressure  - Monitor for seizure activity and implement precautions if appropriate      Outcome: Progressing     Problem: CARDIOVASCULAR - ADULT  Goal: Maintains optimal cardiac output and hemodynamic stability  Description: INTERVENTIONS:  - Monitor I/O, vital signs and rhythm  - Monitor for S/S and trends of decreased cardiac output  - Administer and titrate ordered vasoactive medications to optimize hemodynamic stability  - Assess quality of pulses, skin color and temperature  - Assess for signs of decreased coronary artery perfusion  - Instruct patient to report change in severity of symptoms  Outcome: Progressing     Problem: RESPIRATORY - ADULT  Goal: Achieves optimal ventilation and oxygenation  Description: INTERVENTIONS:  - Assess for changes in respiratory status  - Assess for changes in mentation and behavior  - Position to facilitate oxygenation and minimize respiratory effort  - Oxygen administered by appropriate delivery if ordered  - Initiate smoking cessation education as indicated  - Encourage broncho-pulmonary hygiene including cough, deep breathe, Incentive Spirometry  - Assess the need for suctioning and aspirate as needed  - Assess and instruct to report SOB or any respiratory difficulty  - Respiratory Therapy support as indicated  Outcome: Progressing     Problem: GASTROINTESTINAL - ADULT  Goal: Maintains adequate nutritional intake  Description: INTERVENTIONS:  - Monitor percentage of each meal consumed  - Identify factors contributing to decreased intake, treat as appropriate  - Assist with meals as needed  - Monitor I&O, weight, and lab values if indicated  - Obtain nutrition services referral as needed  Outcome: Progressing     Problem: METABOLIC, FLUID AND ELECTROLYTES - ADULT  Goal: Electrolytes maintained within normal limits  Description: INTERVENTIONS:  - Monitor labs and assess patient for signs and symptoms of electrolyte imbalances  - Administer electrolyte replacement as ordered  - Monitor response to electrolyte replacements, including repeat lab results as appropriate  - Instruct patient on fluid and nutrition as appropriate  Outcome: Progressing  Goal: Fluid balance maintained  Description: INTERVENTIONS:  - Monitor labs   - Monitor I/O and WT  - Instruct patient on fluid and nutrition as appropriate  - Assess for signs & symptoms of volume excess or deficit  Outcome: Progressing     Problem: SKIN/TISSUE INTEGRITY - ADULT  Goal: Skin Integrity remains intact(Skin Breakdown Prevention)  Description: Assess:  -Perform Erickson assessment every shift  -Clean and moisturize skin every day  -Inspect skin when repositioning, toileting, and assisting with ADLS  -Assess extremities for adequate circulation and sensation     Bed Management:  -Have minimal linens on bed & keep smooth, unwrinkled  -Change linens as needed when moist or perspiring  -Avoid sitting or lying in one position for more than 2 hours while in bed  -Keep HOB at 45 degrees     Toileting:  -Offer bedside commode  -Assess for incontinence every 2 hours  -Use incontinent care products after each incontinent episode such as brief    Activity:  -Mobilize patient 3-4 times a day  -Encourage activity and walks on unit  -Encourage or provide ROM exercises   -Turn and reposition patient every 2 Hours  -Use appropriate equipment to lift or move patient in bed  -Instruct/ Assist with weight shifting every 2 when out of bed in chair  -Consider limitation of chair time 2 hour intervals    Skin Care:  -Avoid use of baby powder, tape, friction and shearing, hot water or constrictive clothing  -Relieve pressure over bony prominences using pillows  -Do not massage red bony areas    Next Steps:  -Teach patient strategies to minimize risks such as walker  -Consider consults to  interdisciplinary teams such as wound  Outcome: Progressing  Goal: Incision(s), wounds(s) or drain site(s) healing without S/S of infection  Description: INTERVENTIONS  - Assess and document dressing, incision, wound bed, drain sites and surrounding tissue  - Provide patient and family education  - Perform skin care/dressing changes every PRN  Outcome: Progressing     Problem: MUSCULOSKELETAL - ADULT  Goal: Maintain or return mobility to safest level of function  Description: INTERVENTIONS:  - Assess patient's ability to carry out ADLs; assess patient's baseline for ADL function and identify physical deficits which impact ability to perform ADLs (bathing, care of mouth/teeth, toileting, grooming, dressing, etc )  - Assess/evaluate cause of self-care deficits   - Assess range of motion  - Assess patient's mobility  - Assess patient's need for assistive devices and provide as appropriate  - Encourage maximum independence but intervene and supervise when necessary  - Involve family in performance of ADLs  - Assess for home care needs following discharge   - Consider OT consult to assist with ADL evaluation and planning for discharge  - Provide patient education as appropriate  Outcome: Progressing

## 2022-10-18 NOTE — PROGRESS NOTES
[address] [facility]  Progress Note - Anton Pickup 1970, 45 y o  female MRN: [id number]  Unit/Bed#: [place] [address] -01 Encounter: [id number]  Primary Care Provider: Melo Lester MD   Date and time admitted to hospital: 2022  8:04 AM    * Toxic metabolic encephalopathy  Assessment & Plan  Due to multiple metabolic derangements  MRI does show some injury  Mental status is improving, but far from normal   Will benefit from acute rehab    Trying an increase of Effexor XR to see if this helps with her mood      Bacteremia due to methicillin susceptible Staphylococcus aureus (MSSA)  Assessment & Plan  MSSA bacteremia, repeat cultures neg for greater than 72 hours  Echo with no vegetation  On IV ancef through 10/25    ARF (acute renal failure) (HCC)  Assessment & Plan  · ANUJA/ATN secondary to rhabdomyolysis  No evidence of renal obstruction on imaging    Kidney function has returned to normal    Results from last 7 days   Lab Units 10/13/22  0504   BUN mg/dL 18   CREATININE mg/dL 1 18   EGFR ml/min/1 73sq m 52       Tobacco abuse  Assessment & Plan  · Nicotine patch ordered    Depression  Assessment & Plan  · Does have history of suicide attempt a currently does not show any signs of thoughts of self-harm  · Psychiatry consultation appreciated  · Continue Seroquel 50 mg twice a day   · Zyprexa 5 mg by mouth every 6 hours PRN agitation  · Will increase her Effexor dose as patient's  states she was on a higher dose at home the may have her on now and has noticed if she decreases the dose she would get worse at home in the past          Subjective:   Sleepy this am  Had been walking in the hallways earlier in the day  Answers questions with only one to two word sentences        Objective:     Vitals:   Temp (24hrs), Av 1 °F (36 7 °C), Min:96 7 °F (35 9 °C), Max:99 4 °F (37 4 °C)    Temp:  [96 7 °F (35 9 °C)-99 4 °F (37 4 °C)] 96 7 °F (35 9 °C)  HR:  [] 113  Resp:  [20] 20  BP: (121-122)/(76-80) 121/76  SpO2:  [96 %] 96 %  Body mass index is 18 4 kg/m²  Input and Output Summary (last 24 hours): Intake/Output Summary (Last 24 hours) at 10/18/2022 1512  Last data filed at 10/17/2022 2247  Gross per 24 hour   Intake 50 ml   Output --   Net 50 ml       Physical Exam:     Physical Exam  Vitals and nursing note reviewed  HENT:      Head: Normocephalic and atraumatic  Eyes:      Pupils: Pupils are equal, round, and reactive to light  Cardiovascular:      Rate and Rhythm: Normal rate and regular rhythm  Heart sounds: No murmur heard  No friction rub  No gallop  Pulmonary:      Effort: Pulmonary effort is normal       Breath sounds: Normal breath sounds  No wheezing or rales  Abdominal:      General: Bowel sounds are normal       Palpations: Abdomen is soft  Tenderness: There is no abdominal tenderness  Musculoskeletal:      Right lower leg: No edema  Left lower leg: No edema                 Additional Data:     Labs:    Results from last 7 days   Lab Units 10/18/22  0436   WBC Thousand/uL 9 10   HEMOGLOBIN g/dL 10 3*   HEMATOCRIT % 32 5*   PLATELETS Thousands/uL 357   NEUTROS PCT % 67   LYMPHS PCT % 26   MONOS PCT % 5   EOS PCT % 1     Results from last 7 days   Lab Units 10/18/22  0436 10/13/22  0504   POTASSIUM mmol/L 3 3* 4 1   CHLORIDE mmol/L 102 103   CO2 mmol/L 28 26   BUN mg/dL 12 18   CREATININE mg/dL 1 02 1 18   CALCIUM mg/dL 9 7 10 5*   ALK PHOS U/L  --  93   ALT U/L  --  9*   AST U/L  --  24                       * I Have Reviewed All Lab Data     Recent Cultures (last 7 days):             Last 24 Hours Medication List:   Current Facility-Administered Medications   Medication Dose Route Frequency Provider Last Rate   • acetaminophen  650 mg Oral Q6H PRN Xiang Bridges DO     • ALPRAZolam  0 5 mg Oral HS PRN Sherrie Hecotr MD     • amLODIPine  10 mg Oral Daily Selena Mcdaniel MD     • cefazolin  2,000 mg Intravenous Dagmar Mortimer, MD 2,000 mg (10/18/22 1327)   • gabapentin  300 mg Oral TID Marlee Perez MD     • heparin (porcine)  5,000 Units Subcutaneous [facility] David Hadley, DO     • HYDROmorphone  0 5 mg Intravenous Q4H PRN David Hadley DO     • levalbuterol  1 25 mg Nebulization Q4H PRN Paige Serna DO     • loperamide  2 mg Oral 4x Daily PRN Rachel Doty MD     • metoprolol tartrate  25 mg Oral Q12H [address] Inder Patterson MD     • VANDANA ANTIFUNGAL   Topical BID Paige Serna DO     • nicotine  1 patch Transdermal Daily Atul Adan DO     • OLANZapine  5 mg Oral Q6H PRN Paige Serna DO     • ondansetron  4 mg Intravenous Q4H PRN David Hadley DO     • QUEtiapine  50 mg Oral BID Rachel Doty MD     • saccharomyces boulardii  250 mg Oral BID Rachel Doty MD     • China Gonzales ON 10/19/2022] venlafaxine  187 5 mg Oral Daily Yovanny Garces DO           VTE Pharmacologic Prophylaxis:   Pharmacologic: Heparin      Current Length of Stay: 29 day(s)    Current Patient Status: Inpatient       Discharge Plan: looking for acute rehab    Code Status: Level 1 - Full Code           Today, Patient Was Seen By: Dallin Sampson    ** Please Note: Dictation voice to text software may have been used in the creation of this document   **

## 2022-10-18 NOTE — PHYSICAL THERAPY NOTE
PHYSICAL THERAPY NOTE          Patient Name: Yasemin Alcantara  ZKASG'A Date: 10/18/2022       10/18/22 1537   PT Last Visit   PT Visit Date 10/18/22   Note Type   Note Type Cancelled Session   Cancel Reasons Other  (Attempted PT tx however pt refused to participate 2* to fatigue  1:1 PCA reported that pt was ambulating in room & had just gotten back in bed   Will continue PT per POC )   [employer]

## 2022-10-18 NOTE — SPEECH THERAPY NOTE
Speech Language/Pathology  Attempted to see pt at breakfast  Refused to eat anything  Will f/u as able

## 2022-10-18 NOTE — ASSESSMENT & PLAN NOTE
· Does have history of suicide attempt a currently does not show any signs of thoughts of self-harm  · Psychiatry consultation appreciated  · Continue Seroquel 50 mg twice a day   · Zyprexa 5 mg by mouth every 6 hours PRN agitation  · Will increase her Effexor dose as patient's  states she was on a higher dose at home the may have her on now and has noticed if she decreases the dose she would get worse at home in the past

## 2022-10-19 LAB
ANION GAP SERPL CALCULATED.3IONS-SCNC: 9 MMOL/L (ref 4–13)
BUN SERPL-MCNC: 12 MG/DL (ref 5–25)
CALCIUM SERPL-MCNC: 9.4 MG/DL (ref 8.3–10.1)
CHLORIDE SERPL-SCNC: 104 MMOL/L (ref 96–108)
CO2 SERPL-SCNC: 27 MMOL/L (ref 21–32)
CREAT SERPL-MCNC: 1.01 MG/DL (ref 0.6–1.3)
ERYTHROCYTE [DISTWIDTH] IN BLOOD BY AUTOMATED COUNT: 13.8 % (ref 11.6–15.1)
GFR SERPL CREATININE-BSD FRML MDRD: 63 ML/MIN/1.73SQ M
GLUCOSE SERPL-MCNC: 97 MG/DL (ref 65–140)
HCT VFR BLD AUTO: 34 % (ref 34.8–46.1)
HGB BLD-MCNC: 10.7 G/DL (ref 11.5–15.4)
MAGNESIUM SERPL-MCNC: 1.4 MG/DL (ref 1.6–2.6)
MCH RBC QN AUTO: 32.5 PG (ref 26.8–34.3)
MCHC RBC AUTO-ENTMCNC: 31.5 G/DL (ref 31.4–37.4)
MCV RBC AUTO: 103 FL (ref 82–98)
PLATELET # BLD AUTO: 354 THOUSANDS/UL (ref 149–390)
PMV BLD AUTO: 11.3 FL (ref 8.9–12.7)
POTASSIUM SERPL-SCNC: 3.4 MMOL/L (ref 3.5–5.3)
RBC # BLD AUTO: 3.29 MILLION/UL (ref 3.81–5.12)
SODIUM SERPL-SCNC: 140 MMOL/L (ref 135–147)
WBC # BLD AUTO: 10.01 THOUSAND/UL (ref 4.31–10.16)

## 2022-10-19 RX ORDER — POTASSIUM CHLORIDE 20 MEQ/1
40 TABLET, EXTENDED RELEASE ORAL ONCE
Status: COMPLETED | OUTPATIENT
Start: 2022-10-19 | End: 2022-10-19

## 2022-10-19 RX ADMIN — POTASSIUM CHLORIDE 40 MEQ: 1500 TABLET, EXTENDED RELEASE ORAL at 11:39

## 2022-10-19 RX ADMIN — Medication 250 MG: at 08:09

## 2022-10-19 RX ADMIN — GABAPENTIN 300 MG: 300 CAPSULE ORAL at 17:08

## 2022-10-19 RX ADMIN — METOPROLOL TARTRATE 25 MG: 25 TABLET, FILM COATED ORAL at 22:02

## 2022-10-19 RX ADMIN — METOPROLOL TARTRATE 25 MG: 25 TABLET, FILM COATED ORAL at 08:09

## 2022-10-19 RX ADMIN — ALPRAZOLAM 0.5 MG: 0.5 TABLET ORAL at 22:07

## 2022-10-19 RX ADMIN — HEPARIN SODIUM 5000 UNITS: 5000 INJECTION INTRAVENOUS; SUBCUTANEOUS at 05:30

## 2022-10-19 RX ADMIN — HEPARIN SODIUM 5000 UNITS: 5000 INJECTION INTRAVENOUS; SUBCUTANEOUS at 14:13

## 2022-10-19 RX ADMIN — VENLAFAXINE HYDROCHLORIDE 187.5 MG: 37.5 CAPSULE, EXTENDED RELEASE ORAL at 08:15

## 2022-10-19 RX ADMIN — MICONAZOLE NITRATE: 20 CREAM TOPICAL at 08:10

## 2022-10-19 RX ADMIN — GABAPENTIN 300 MG: 300 CAPSULE ORAL at 08:09

## 2022-10-19 RX ADMIN — Medication 250 MG: at 17:08

## 2022-10-19 RX ADMIN — CEFAZOLIN SODIUM 2000 MG: 2 SOLUTION INTRAVENOUS at 22:02

## 2022-10-19 RX ADMIN — Medication 1 PATCH: at 08:10

## 2022-10-19 RX ADMIN — HEPARIN SODIUM 5000 UNITS: 5000 INJECTION INTRAVENOUS; SUBCUTANEOUS at 22:02

## 2022-10-19 RX ADMIN — MICONAZOLE NITRATE: 20 CREAM TOPICAL at 17:09

## 2022-10-19 RX ADMIN — CEFAZOLIN SODIUM 2000 MG: 2 SOLUTION INTRAVENOUS at 12:22

## 2022-10-19 RX ADMIN — QUETIAPINE FUMARATE 50 MG: 25 TABLET ORAL at 08:09

## 2022-10-19 RX ADMIN — GABAPENTIN 300 MG: 300 CAPSULE ORAL at 22:02

## 2022-10-19 RX ADMIN — CEFAZOLIN SODIUM 2000 MG: 2 SOLUTION INTRAVENOUS at 05:47

## 2022-10-19 RX ADMIN — QUETIAPINE FUMARATE 50 MG: 25 TABLET ORAL at 17:08

## 2022-10-19 RX ADMIN — AMLODIPINE BESYLATE 10 MG: 10 TABLET ORAL at 08:09

## 2022-10-19 RX ADMIN — OLANZAPINE 5 MG: 5 TABLET, ORALLY DISINTEGRATING ORAL at 18:52

## 2022-10-19 NOTE — OCCUPATIONAL THERAPY NOTE
Occupational Therapy Progress Note     Patient Name: Nichole Yu  Today's Date: 10/19/2022  Problem List  Principal Problem:    Toxic metabolic encephalopathy  Active Problems:    Depression    Tobacco abuse    DDD (degenerative disc disease), lumbosacral    ARF (acute renal failure) (HCC)    Transaminitis    Abnormal CT of the chest    Traumatic rhabdomyolysis (HCC)    D-dimer, elevated    Leg edema, right    Localized swelling of right upper extremity    Bacteremia due to methicillin susceptible Staphylococcus aureus (MSSA)    Pulmonary edema    Aspiration pneumonitis (HCC)    Hypokalemia    Hypernatremia    Diarrhea    Mild protein-calorie malnutrition ([facility] [address] )       10/19/22 1315   OT Last Visit   OT Visit Date 10/19/22   Note Type   Note Type Treatment   Pain Assessment   Pain Assessment Tool FLACC   Pain Location/Orientation Orientation: Right;[place] Foot; Other (Comment)  (ankle)   Hospital Pain Intervention(s) Repositioned; Ambulation/increased activity; Emotional support; Rest   Pain Rating: FLACC (Rest) - Face 0   Pain Rating: FLACC (Rest) - Legs 0   Pain Rating: FLACC (Rest) - Activity 0   Pain Rating: FLACC (Rest) - Cry 0   Pain Rating: FLACC (Rest) - Consolability 0   Score: FLACC (Rest) 0   Pain Rating: FLACC (Activity) - Face 1   Pain Rating: FLACC (Activity) - Legs 1   Pain Rating: FLACC (Activity) - Activity 1   Pain Rating: FLACC (Activity) - Cry 1   Pain Rating: FLACC (Activity) - Consolability 1   Score: FLACC (Activity) 5   Restrictions/Precautions   Weight Bearing Precautions Per Order No   Other Precautions Cognitive; Chair Alarm; Bed Alarm;1:1;Multiple lines; Fall Risk;Pain   ADL   Where Assessed Edge of bed   Grooming Assistance 4  Minimal Assistance   Grooming Deficit Setup;Verbal cueing;Supervision/safety; Increased time to complete;Wash/dry hands; Wash/dry face; Teeth care   Grooming Comments difficulty initiating tasks   LB Dressing Assistance 2  Maximal Assistance   LB Dressing Deficit Setup;Steadying; Requires assistive device for steadying; Impulsive;Verbal cueing;Supervision/safety; Increased time to complete; Don/doff R sock; Don/doff L sock; Thread RLE into pants; Thread LLE into pants;Pull up over hips   LB Dressing Comments Pt requiring max cuing for donning hospital pants; difficulty initiating task and sequencing  Able to don/doff socks with extended time  Bed Mobility   Supine to Sit 5  Supervision   Additional items Assist x 1; Increased time required;Verbal cues; Bedrails; Impulsive   Transfers   Sit to Stand 4  Minimal assistance   Additional items Assist x 1;Bedrails; Increased time required;Verbal cues   Stand to Sit 4  Minimal assistance   Additional items Assist x 1;Bedrails; Increased time required;Verbal cues   Additional Comments CGA for safety   Functional Mobility   Functional Mobility 4  Minimal assistance   Additional Comments x2, no AD, intermittent HHA or use of railing in hallway; approx  60 ft   Subjective   Subjective "I can get up"   Cognition   Overall Cognitive Status Impaired   Arousal/Participation Alert; Responsive; Cooperative   Attention Attends with cues to redirect   Orientation Level Oriented to person;Oriented to place; Disoriented to situation;Disoriented to time  (oriented to time with options)   Memory Decreased recall of precautions;Decreased short term memory;Decreased recall of recent events   Following Commands Follows one step commands with increased time or repetition   Comments more communicative today, but simple one worded questions are easier for pt to answer  difficulty expressing more complex thoughts     Activity Tolerance   Activity Tolerance Patient tolerated treatment well;Patient limited by fatigue;Patient limited by pain;Treatment limited secondary to medical complications (Comment)   Medical Staff Made Aware RN, PT [place]   Assessment   Assessment Patient participated in skilled OT session this date with interventions consisting of therapeutic activities, neuro-reeducation, and self-care/ADL training to increase B UE strength, functional endurance/activity tolerance, static/dynamic sitting/standing balance, and overall safety awareness to increase (I) with ADLs/IADLs to return to PLOF  Provided education on energy conservation techniques, deep breathing techniques, fall prevention strategies, and overall safety awareness  Patient agreeable to OT treatment session, upon arrival patient was found supine in bed  Refer to flowsheet for functional performance  Patient requiring verbal cues for safety and verbal cues for pacing through activity steps  Trialed donning pants today, as pt progressing with task initiation/completion of donning socks  This proves to be a difficult task for pt to initiate and sequences, requiring mod physical (A) and max verbal cuing  Pt progressing with familiar task of brushing her teeth; continues to require assistance w putting toothpaste on toothbrush  Patient continues to be functioning below baseline level, occupational performance remains limited secondary to factors listed above and increased risk for falls and injury  The patient's raw score on the AM-PAC Daily Activity inpatient short form is 14, standardized score is 33 39, less than 39 4  Patients at this level are likely to benefit from discharge to post-acute rehabilitation services  Please refer to the recommendation of the Occupational Therapist for safe discharge planning  Plan   Treatment Interventions ADL retraining;Functional transfer training;UE strengthening/ROM; Endurance training;Cognitive reorientation;Patient/family training;Equipment evaluation/education; Fine motor coordination activities; Compensatory technique education;Continued evaluation; Energy conservation; Activityengagement   Goal Expiration Date 10/31/22   OT Treatment Day 9   OT Frequency 3-5x/wk   Recommendation   OT Discharge Recommendation Post acute rehabilitation services   [facility] Daily Activity Inpatient   Lower Body Dressing 2   Bathing 2   Toileting 2   Upper Body Dressing 2   Grooming 3   Eating 3   Daily Activity Raw Score 14   Daily Activity Standardized Score (Calc for Raw Score >=11) 33 39   AM-PAC Applied Cognition Inpatient   Following a Speech/Presentation 3   Understanding Ordinary Conversation 3   Taking Medications 1   Remembering Where Things Are Placed or Put Away 1   Remembering List of 4-5 Errands 1   Taking Care of Complicated Tasks 1   Applied Cognition Raw Score 10   Applied Cognition Standardized Score 24 98     Trever Ponce

## 2022-10-19 NOTE — PLAN OF CARE
Problem: OCCUPATIONAL THERAPY ADULT  Goal: Performs self-care activities at highest level of function for planned discharge setting  See evaluation for individualized goals  Description: Treatment Interventions: ADL retraining, Functional transfer training, UE strengthening/ROM, Endurance training, Cognitive reorientation, Patient/family training, Equipment evaluation/education, Fine motor coordination activities, Compensatory technique education, Continued evaluation, Energy conservation, Activityengagement          See flowsheet documentation for full assessment, interventions and recommendations  Outcome: Progressing  Note: Limitation: Decreased ADL status, Decreased Safe judgement during ADL, Decreased cognition, Decreased endurance, Decreased high-level ADLs     Assessment: Patient participated in skilled OT session this date with interventions consisting of therapeutic activities, neuro-reeducation, and self-care/ADL training to increase B UE strength, functional endurance/activity tolerance, static/dynamic sitting/standing balance, and overall safety awareness to increase (I) with ADLs/IADLs to return to PLOF  Provided education on energy conservation techniques, deep breathing techniques, fall prevention strategies, and overall safety awareness  Patient agreeable to OT treatment session, upon arrival patient was found supine in bed  Refer to flowsheet for functional performance  Patient requiring verbal cues for safety and verbal cues for pacing through activity steps  Trialed donning pants today, as pt progressing with task initiation/completion of donning socks  This proves to be a difficult task for pt to initiate and sequences, requiring mod physical (A) and max verbal cuing  Pt progressing with familiar task of brushing her teeth; continues to require assistance w putting toothpaste on toothbrush   Patient continues to be functioning below baseline level, occupational performance remains limited secondary to factors listed above and increased risk for falls and injury  The patient's raw score on the AM-PAC Daily Activity inpatient short form is 14, standardized score is 33 39, less than 39 4  Patients at this level are likely to benefit from discharge to post-acute rehabilitation services  Please refer to the recommendation of the Occupational Therapist for safe discharge planning       OT Discharge Recommendation: Post acute rehabilitation services

## 2022-10-19 NOTE — PLAN OF CARE
Problem: PAIN - ADULT  Goal: Verbalizes/displays adequate comfort level or baseline comfort level  Description: Interventions:  - Encourage patient to monitor pain and request assistance  - Assess pain using appropriate pain scale  - Administer analgesics based on type and severity of pain and evaluate response  - Implement non-pharmacological measures as appropriate and evaluate response  - Consider cultural and social influences on pain and pain management  - Notify physician/advanced practitioner if interventions unsuccessful or patient reports new pain  Outcome: Progressing     Problem: INFECTION - ADULT  Goal: Absence or prevention of progression during hospitalization  Description: INTERVENTIONS:  - Assess and monitor for signs and symptoms of infection  - Monitor lab/diagnostic results  - Monitor all insertion sites, i e  indwelling lines, tubes, and drains  - Monitor endotracheal if appropriate and nasal secretions for changes in amount and color  - [place] appropriate cooling/warming therapies per order  - Administer medications as ordered  - Instruct and encourage patient and family to use good hand hygiene technique  - Identify and instruct in appropriate isolation precautions for identified infection/condition  Outcome: Progressing     Problem: SAFETY ADULT  Goal: Patient will remain free of falls  Description: INTERVENTIONS:  - Educate patient/family on patient safety including physical limitations  - Instruct patient to call for assistance with activity   - Consult OT/PT to assist with strengthening/mobility   - Keep Call bell within reach  - Keep bed low and locked with side rails adjusted as appropriate  - Keep care items and personal belongings within reach  - Initiate and maintain comfort rounds  - Make Fall Risk Sign visible to staff  - Offer Toileting every 2 Hours, in advance of need  - Initiate/Maintain bed alarm  - Obtain necessary fall risk management equipment: walker  - Apply yellow socks and bracelet for high fall risk patients  - Consider moving patient to room near nurses station  Outcome: Progressing  Goal: Maintain or return to baseline ADL function  Description: INTERVENTIONS:  -  Assess patient's ability to carry out ADLs; assess patient's baseline for ADL function and identify physical deficits which impact ability to perform ADLs (bathing, care of mouth/teeth, toileting, grooming, dressing, etc )  - Assess/evaluate cause of self-care deficits   - Assess range of motion  - Assess patient's mobility; develop plan if impaired  - Assess patient's need for assistive devices and provide as appropriate  - Encourage maximum independence but intervene and supervise when necessary  - Involve family in performance of ADLs  - Assess for home care needs following discharge   - Consider OT consult to assist with ADL evaluation and planning for discharge  - Provide patient education as appropriate  Outcome: Progressing  Goal: Maintains/Returns to pre admission functional level  Description: INTERVENTIONS:  - Perform BMAT or MOVE assessment daily    - Set and communicate daily mobility goal to care team and patient/family/caregiver  - Collaborate with rehabilitation services on mobility goals if consulted  - Perform Range of Motion 4-6 times a day  - Reposition patient every 2 hours    - Dangle patient 3-4 times a day  - Stand patient 3 times a day  - Ambulate patient 3-4 times a day  - Out of bed for toileting  - Record patient progress and toleration of activity level   Outcome: Progressing     Problem: DISCHARGE PLANNING  Goal: Discharge to home or other facility with appropriate resources  Description: INTERVENTIONS:  - Identify barriers to discharge w/patient and caregiver  - Arrange for needed discharge resources and transportation as appropriate  - Identify discharge learning needs (meds, wound care, etc )  - Arrange for interpretive services to assist at discharge as needed  - Refer to Case Management Department for coordinating discharge planning if the patient needs post-hospital services based on physician/advanced practitioner order or complex needs related to functional status, cognitive ability, or social support system  Outcome: Progressing     Problem: Knowledge Deficit  Goal: Patient/family/caregiver demonstrates understanding of disease process, treatment plan, medications, and discharge instructions  Description: Complete learning assessment and assess knowledge base    Interventions:  - Provide teaching at level of understanding  - Provide teaching via preferred learning methods  Outcome: Progressing     Problem: Potential for Falls  Goal: Patient will remain free of falls  Description: INTERVENTIONS:  - Educate patient/family on patient safety including physical limitations  - Instruct patient to call for assistance with activity   - Consult OT/PT to assist with strengthening/mobility   - Keep Call bell within reach  - Keep bed low and locked with side rails adjusted as appropriate  - Keep care items and personal belongings within reach  - Initiate and maintain comfort rounds  - Make Fall Risk Sign visible to staff  - Offer Toileting every 2 Hours, in advance of need  - Initiate/Maintain bed alarm  - Obtain necessary fall risk management equipment: walker  - Apply yellow socks and bracelet for high fall risk patients  - Consider moving patient to room near nurses station  Outcome: Progressing     Problem: MOBILITY - ADULT  Goal: Maintain or return to baseline ADL function  Description: INTERVENTIONS:  -  Assess patient's ability to carry out ADLs; assess patient's baseline for ADL function and identify physical deficits which impact ability to perform ADLs (bathing, care of mouth/teeth, toileting, grooming, dressing, etc )  - Assess/evaluate cause of self-care deficits   - Assess range of motion  - Assess patient's mobility; develop plan if impaired  - Assess patient's need for assistive devices and provide as appropriate  - Encourage maximum independence but intervene and supervise when necessary  - Involve family in performance of ADLs  - Assess for home care needs following discharge   - Consider OT consult to assist with ADL evaluation and planning for discharge  - Provide patient education as appropriate  Outcome: Progressing  Goal: Maintains/Returns to pre admission functional level  Description: INTERVENTIONS:  - Perform BMAT or MOVE assessment daily    - Set and communicate daily mobility goal to care team and patient/family/caregiver  - Collaborate with rehabilitation services on mobility goals if consulted  - Perform Range of Motion 4-6 times a day  - Reposition patient every 2 hours  - Dangle patient 3-4 times a day  - Stand patient 3 times a day  - Ambulate patient 3-4 times a day  - Out of bed for toileting  - Record patient progress and toleration of activity level   Outcome: Progressing     Problem: Prexisting or High Potential for Compromised Skin Integrity  Goal: Skin integrity is maintained or improved  Description: INTERVENTIONS:  - Identify patients at risk for skin breakdown  - Assess and monitor skin integrity  - Assess and monitor nutrition and hydration status  - Monitor labs   - Assess for incontinence   - Turn and reposition patient  - Assist with mobility/ambulation  - Relieve pressure over bony prominences  - Avoid friction and shearing  - Provide appropriate hygiene as needed including keeping skin clean and dry  - Evaluate need for skin moisturizer/barrier cream  - Collaborate with interdisciplinary team   - Patient/family teaching  - Consider wound care consult   Outcome: Progressing     Problem: Nutrition/Hydration-ADULT  Goal: Nutrient/Hydration intake appropriate for improving, restoring or maintaining nutritional needs  Description: Monitor and assess patient's nutrition/hydration status for malnutrition   Collaborate with interdisciplinary team and initiate plan and interventions as ordered  Monitor patient's weight and dietary intake as ordered or per policy  Utilize nutrition screening tool and intervene as necessary  Determine patient's food preferences and provide high-protein, high-caloric foods as appropriate  INTERVENTIONS:  - Monitor oral intake, urinary output, labs, and treatment plans  - Assess nutrition and hydration status and recommend course of action  - Evaluate amount of meals eaten  - Assist patient with eating if necessary   - Allow adequate time for meals  - Recommend/ encourage appropriate diets, oral nutritional supplements, and vitamin/mineral supplements  - Order, calculate, and assess calorie counts as needed  - Recommend, monitor, and adjust tube feedings and TPN/PPN based on assessed needs  - Assess need for intravenous fluids  - Provide specific nutrition/hydration education as appropriate  - Include patient/family/caregiver in decisions related to nutrition  Outcome: Progressing     Problem: NEUROSENSORY - ADULT  Goal: Achieves maximal functionality and self care  Description: INTERVENTIONS  - Monitor swallowing and airway patency with patient fatigue and changes in neurological status  - Encourage and assist patient to increase activity and self care     - Encourage visually impaired, hearing impaired and aphasic patients to use assistive/communication devices  Outcome: Progressing  Goal: Achieves stable or improved neurological status  Description: INTERVENTIONS  - Monitor and report changes in neurological status  - Monitor vital signs such as temperature, blood pressure, glucose, and any other labs ordered   - Initiate measures to prevent increased intracranial pressure  - Monitor for seizure activity and implement precautions if appropriate      Outcome: Progressing     Problem: CARDIOVASCULAR - ADULT  Goal: Maintains optimal cardiac output and hemodynamic stability  Description: INTERVENTIONS:  - Monitor I/O, vital signs and rhythm  - Monitor for S/S and trends of decreased cardiac output  - Administer and titrate ordered vasoactive medications to optimize hemodynamic stability  - Assess quality of pulses, skin color and temperature  - Assess for signs of decreased coronary artery perfusion  - Instruct patient to report change in severity of symptoms  Outcome: Progressing     Problem: RESPIRATORY - ADULT  Goal: Achieves optimal ventilation and oxygenation  Description: INTERVENTIONS:  - Assess for changes in respiratory status  - Assess for changes in mentation and behavior  - Position to facilitate oxygenation and minimize respiratory effort  - Oxygen administered by appropriate delivery if ordered  - Initiate smoking cessation education as indicated  - Encourage broncho-pulmonary hygiene including cough, deep breathe, Incentive Spirometry  - Assess the need for suctioning and aspirate as needed  - Assess and instruct to report SOB or any respiratory difficulty  - Respiratory Therapy support as indicated  Outcome: Progressing     Problem: GASTROINTESTINAL - ADULT  Goal: Maintains adequate nutritional intake  Description: INTERVENTIONS:  - Monitor percentage of each meal consumed  - Identify factors contributing to decreased intake, treat as appropriate  - Assist with meals as needed  - Monitor I&O, weight, and lab values if indicated  - Obtain nutrition services referral as needed  Outcome: Progressing     Problem: METABOLIC, FLUID AND ELECTROLYTES - ADULT  Goal: Electrolytes maintained within normal limits  Description: INTERVENTIONS:  - Monitor labs and assess patient for signs and symptoms of electrolyte imbalances  - Administer electrolyte replacement as ordered  - Monitor response to electrolyte replacements, including repeat lab results as appropriate  - Instruct patient on fluid and nutrition as appropriate  Outcome: Progressing  Goal: Fluid balance maintained  Description: INTERVENTIONS:  - Monitor labs   - Monitor I/O and WT  - Instruct patient on fluid and nutrition as appropriate  - Assess for signs & symptoms of volume excess or deficit  Outcome: Progressing     Problem: SKIN/TISSUE INTEGRITY - ADULT  Goal: Skin Integrity remains intact(Skin Breakdown Prevention)  Description: Assess:  -Perform Erickson assessment every shift  -Clean and moisturize skin every day  -Inspect skin when repositioning, toileting, and assisting with ADLS  -Assess extremities for adequate circulation and sensation     Bed Management:  -Have minimal linens on bed & keep smooth, unwrinkled  -Change linens as needed when moist or perspiring  -Avoid sitting or lying in one position for more than 2 hours while in bed  -Keep HOB at 45 degrees     Toileting:  -Offer bedside commode  -Assess for incontinence every 2 hours  -Use incontinent care products after each incontinent episode such as brief    Activity:  -Mobilize patient 3-4 times a day  -Encourage activity and walks on unit  -Encourage or provide ROM exercises   -Turn and reposition patient every 2 Hours  -Use appropriate equipment to lift or move patient in bed  -Instruct/ Assist with weight shifting every 2 when out of bed in chair  -Consider limitation of chair time 2 hour intervals    Skin Care:  -Avoid use of baby powder, tape, friction and shearing, hot water or constrictive clothing  -Relieve pressure over bony prominences using pillows  -Do not massage red bony areas    Next Steps:  -Teach patient strategies to minimize risks such as walker  -Consider consults to  interdisciplinary teams such as wound  Outcome: Progressing  Goal: Incision(s), wounds(s) or drain site(s) healing without S/S of infection  Description: INTERVENTIONS  - Assess and document dressing, incision, wound bed, drain sites and surrounding tissue  - Provide patient and family education  - Perform skin care/dressing changes every PRN  Outcome: Progressing     Problem: MUSCULOSKELETAL - ADULT  Goal: Maintain or return mobility to safest level of function  Description: INTERVENTIONS:  - Assess patient's ability to carry out ADLs; assess patient's baseline for ADL function and identify physical deficits which impact ability to perform ADLs (bathing, care of mouth/teeth, toileting, grooming, dressing, etc )  - Assess/evaluate cause of self-care deficits   - Assess range of motion  - Assess patient's mobility  - Assess patient's need for assistive devices and provide as appropriate  - Encourage maximum independence but intervene and supervise when necessary  - Involve family in performance of ADLs  - Assess for home care needs following discharge   - Consider OT consult to assist with ADL evaluation and planning for discharge  - Provide patient education as appropriate  Outcome: Progressing

## 2022-10-19 NOTE — PROGRESS NOTES
Progress Note - Infectious Disease   Belkis Harrell 45 y o  female MRN: [id number]  Unit/Bed#: [place] [address] -01 Encounter: [id number]    Impression/Plan:  1  SIRS vs Sepsis  Evolving since admission  Fever, tachycardia, and mild leukocytosis  Suspect this is likely due to MSSA bacteremia  No other clear source appreciated  COVID-19 negative   UA benign   Status post lumbar puncture for altered mental status with negative ME panel   Patient has improved with no recurrent high fever spike  No lactic acidosis  This morning she is afebrile  Her WBC count has improved  Repeat blood cultures were negative >5 days    -antibiotics as below   -monitor CBCD and BMP  -monitor vitals  -supportive care      2  MSSA bacteremia  Patient was found down for prolonged period of time with evidence of multiple wounds on admission  Suspect cutaneous vs endovascular source, likely phlebitis as there is report of RUE erythema surrounding prior IV in addition to palpable cord in left antecub  TTE negative for obvious vegetation  CHERYLE was planned but then cancelled due to tenuous respiratory status  Repeat TTE still without good visualization of aortic valve  The patient is currently receiving IV cefazolin which she is tolerating without difficulty  Anticipate 4 weeks of IV antibiotic   If she transfers out of [facility] before completion of IV antibiotic treatment she will need PICC placed vs facility agreeing to peripheral IVs   -continue IV cefazolin through 10/25/2022 for 4 weeks of antibiotic treatment after cleared blood cultures  -weekly CBCD and creatinine while on IV antibiotic  -monitor vitals  -if patient transfers to rehab before completion of IV antibiotic treatment she will need a PICC placed vs facility agreeing to peripheral IVs  -patient will require outpatient ID follow up after discharge, I will coordinate as needed and place information in discharge planning  -patient will need surveillance blood cultures 2 weeks after finishing IV antibiotic treatment, to be drawn 11/8/2022     3  Toxic Metabolic encephalopathy   Likely multifactorial etiology with uremia playing a role  This was initially thought to be secondary to gabapentin and morphine use in the setting of renal failure, hypotension, rhabdomyolysis   MRI brain negative   Lumbar puncture with opening pressure 26  Negative ME panel  CSF fluid culture not collected   The most likely to be secondary to metabolic issues, medication effect, and possible Wernicke's encephalopathy  Psychiatric issues also likely playing a role   Neurology has reassessed patient  Mental status remains significantly altered  She is being considered for [facility] brain injury unit   -serial neuro exams  -monitor cognition, mood, and mental status  -treat metabolic issues and psychiatric issues  -supportive care  -continue follow up with behavioral health  -continue follow up with neurology   -no additional ID workup needed     4  Acute renal failure   Creatinine on admission 5 88 with CK over 32,000  Creatinine reached plateau, now down trending   Likely multifactorial etiology in the setting of rhabdomyolysis and dehydration  Likely ANUJA/ATN    Most recent creatinine was improved to 1 02   -monitor creatinine  -dose adjust antibiotics for renal function as needed  -avoid nephrotoxins  -continue follow up with nephrology     5  Tobacco abuse   Encourage cessation as recommended by primary team   -NRT per primary service     6  Antibiotic allergy  Reports hives with penicillin  Patient has been tolerating cephalosporin without difficulty  -monitor patient for adverse medication reactions    Above plan was discussed in detail with patient at the bedside  Above plan was discussed in detail with SLIM  Antibiotics:  Cefazolin 22  Antibiotics 22    Subjective:  Limited ROS, patient is more tired today and less interactive  She denies chills, chest pain, abdominal pain, nausea   Per staff no vomiting or diarrhea  Remains on 1:1 today  Objective:  Vitals:  Temp:  [96 9 °F (36 1 °C)-99 7 °F (37 6 °C)] 99 7 °F (37 6 °C)  HR:  [100-114] 100  Resp:  [20] 20  BP: (117-143)/(78-90) 143/90  SpO2:  [97 %] 97 %  Temp (24hrs), Av 3 °F (36 8 °C), Min:96 9 °F (36 1 °C), Max:99 7 °F (37 6 °C)  Current: Temperature: 99 7 °F (37 6 °C)    Physical Exam:   General Appearance:  Tired, somewhat interactive but confused  She appears comfortable laying on her R side, less restless, in no acute distress  Throat: Oropharynx moist without lesions  Lungs:   Clear to auscultation bilaterally; no wheezes, rhonchi or rales; respirations unlabored on room air  Heart:  RRR; no murmur, rub or gallop  Abdomen:   Soft, non-tender, non-distended, positive bowel sounds  Extremities: No clubbing or cyanosis, no edema  Skin: No new rashes, lesions, or draining wounds noted on exposed skin  Labs, Imaging, & Other studies:   All pertinent labs and imaging studies were personally reviewed  Results from last 7 days   Lab Units 10/19/22  0445 10/18/22  0436 10/13/22  0504   WBC Thousand/uL 10 01 9 10 9 99   HEMOGLOBIN g/dL 10 7* 10 3* 9 8*   PLATELETS Thousands/uL 354 357 266     Results from last 7 days   Lab Units 10/19/22  0445 10/18/22  0436 10/13/22  0504   POTASSIUM mmol/L 3 4*   < > 4 1   CHLORIDE mmol/L 104   < > 103   CO2 mmol/L 27   < > 26   BUN mg/dL 12   < > 18   CREATININE mg/dL 1 01   < > 1 18   EGFR ml/min/1 73sq m 63   < > 52   CALCIUM mg/dL 9 4   < > 10 5*   AST U/L  --   --  24   ALT U/L  --   --  9*   ALK PHOS U/L  --   --  93    < > = values in this interval not displayed

## 2022-10-19 NOTE — PLAN OF CARE
Problem: PAIN - ADULT  Goal: Verbalizes/displays adequate comfort level or baseline comfort level  Description: Interventions:  - Encourage patient to monitor pain and request assistance  - Assess pain using appropriate pain scale  - Administer analgesics based on type and severity of pain and evaluate response  - Implement non-pharmacological measures as appropriate and evaluate response  - Consider cultural and social influences on pain and pain management  - Notify physician/advanced practitioner if interventions unsuccessful or patient reports new pain  Outcome: Progressing     Problem: INFECTION - ADULT  Goal: Absence or prevention of progression during hospitalization  Description: INTERVENTIONS:  - Assess and monitor for signs and symptoms of infection  - Monitor lab/diagnostic results  - Monitor all insertion sites, i e  indwelling lines, tubes, and drains  - Monitor endotracheal if appropriate and nasal secretions for changes in amount and color  - [place] appropriate cooling/warming therapies per order  - Administer medications as ordered  - Instruct and encourage patient and family to use good hand hygiene technique  - Identify and instruct in appropriate isolation precautions for identified infection/condition  Outcome: Progressing     Problem: SAFETY ADULT  Goal: Patient will remain free of falls  Description: INTERVENTIONS:  - Educate patient/family on patient safety including physical limitations  - Instruct patient to call for assistance with activity   - Consult OT/PT to assist with strengthening/mobility   - Keep Call bell within reach  - Keep bed low and locked with side rails adjusted as appropriate  - Keep care items and personal belongings within reach  - Initiate and maintain comfort rounds  - Make Fall Risk Sign visible to staff  - Offer Toileting every 2 Hours, in advance of need  - Initiate/Maintain bed alarm  - Obtain necessary fall risk management equipment: walker  - Apply yellow socks and bracelet for high fall risk patients  - Consider moving patient to room near nurses station  Outcome: Progressing  Goal: Maintain or return to baseline ADL function  Description: INTERVENTIONS:  -  Assess patient's ability to carry out ADLs; assess patient's baseline for ADL function and identify physical deficits which impact ability to perform ADLs (bathing, care of mouth/teeth, toileting, grooming, dressing, etc )  - Assess/evaluate cause of self-care deficits   - Assess range of motion  - Assess patient's mobility; develop plan if impaired  - Assess patient's need for assistive devices and provide as appropriate  - Encourage maximum independence but intervene and supervise when necessary  - Involve family in performance of ADLs  - Assess for home care needs following discharge   - Consider OT consult to assist with ADL evaluation and planning for discharge  - Provide patient education as appropriate  Outcome: Progressing  Goal: Maintains/Returns to pre admission functional level  Description: INTERVENTIONS:  - Perform BMAT or MOVE assessment daily    - Set and communicate daily mobility goal to care team and patient/family/caregiver  - Collaborate with rehabilitation services on mobility goals if consulted  - Perform Range of Motion 4-6 times a day  - Reposition patient every 2 hours    - Dangle patient 3-4 times a day  - Stand patient 3 times a day  - Ambulate patient 3-4 times a day  - Out of bed for toileting  - Record patient progress and toleration of activity level   Outcome: Progressing     Problem: DISCHARGE PLANNING  Goal: Discharge to home or other facility with appropriate resources  Description: INTERVENTIONS:  - Identify barriers to discharge w/patient and caregiver  - Arrange for needed discharge resources and transportation as appropriate  - Identify discharge learning needs (meds, wound care, etc )  - Arrange for interpretive services to assist at discharge as needed  - Refer to Case Management Department for coordinating discharge planning if the patient needs post-hospital services based on physician/advanced practitioner order or complex needs related to functional status, cognitive ability, or social support system  Outcome: Progressing     Problem: Knowledge Deficit  Goal: Patient/family/caregiver demonstrates understanding of disease process, treatment plan, medications, and discharge instructions  Description: Complete learning assessment and assess knowledge base    Interventions:  - Provide teaching at level of understanding  - Provide teaching via preferred learning methods  Outcome: Progressing     Problem: Potential for Falls  Goal: Patient will remain free of falls  Description: INTERVENTIONS:  - Educate patient/family on patient safety including physical limitations  - Instruct patient to call for assistance with activity   - Consult OT/PT to assist with strengthening/mobility   - Keep Call bell within reach  - Keep bed low and locked with side rails adjusted as appropriate  - Keep care items and personal belongings within reach  - Initiate and maintain comfort rounds  - Make Fall Risk Sign visible to staff  - Offer Toileting every 2 Hours, in advance of need  - Initiate/Maintain bed alarm  - Obtain necessary fall risk management equipment: walker  - Apply yellow socks and bracelet for high fall risk patients  - Consider moving patient to room near nurses station  Outcome: Progressing     Problem: MOBILITY - ADULT  Goal: Maintain or return to baseline ADL function  Description: INTERVENTIONS:  -  Assess patient's ability to carry out ADLs; assess patient's baseline for ADL function and identify physical deficits which impact ability to perform ADLs (bathing, care of mouth/teeth, toileting, grooming, dressing, etc )  - Assess/evaluate cause of self-care deficits   - Assess range of motion  - Assess patient's mobility; develop plan if impaired  - Assess patient's need for assistive devices and provide as appropriate  - Encourage maximum independence but intervene and supervise when necessary  - Involve family in performance of ADLs  - Assess for home care needs following discharge   - Consider OT consult to assist with ADL evaluation and planning for discharge  - Provide patient education as appropriate  Outcome: Progressing  Goal: Maintains/Returns to pre admission functional level  Description: INTERVENTIONS:  - Perform BMAT or MOVE assessment daily    - Set and communicate daily mobility goal to care team and patient/family/caregiver  - Collaborate with rehabilitation services on mobility goals if consulted  - Perform Range of Motion 4-6 times a day  - Reposition patient every 2 hours  - Dangle patient 3-4 times a day  - Stand patient 3 times a day  - Ambulate patient 3-4 times a day  - Out of bed for toileting  - Record patient progress and toleration of activity level   Outcome: Progressing     Problem: Prexisting or High Potential for Compromised Skin Integrity  Goal: Skin integrity is maintained or improved  Description: INTERVENTIONS:  - Identify patients at risk for skin breakdown  - Assess and monitor skin integrity  - Assess and monitor nutrition and hydration status  - Monitor labs   - Assess for incontinence   - Turn and reposition patient  - Assist with mobility/ambulation  - Relieve pressure over bony prominences  - Avoid friction and shearing  - Provide appropriate hygiene as needed including keeping skin clean and dry  - Evaluate need for skin moisturizer/barrier cream  - Collaborate with interdisciplinary team   - Patient/family teaching  - Consider wound care consult   Outcome: Progressing     Problem: Nutrition/Hydration-ADULT  Goal: Nutrient/Hydration intake appropriate for improving, restoring or maintaining nutritional needs  Description: Monitor and assess patient's nutrition/hydration status for malnutrition   Collaborate with interdisciplinary team and initiate plan and interventions as ordered  Monitor patient's weight and dietary intake as ordered or per policy  Utilize nutrition screening tool and intervene as necessary  Determine patient's food preferences and provide high-protein, high-caloric foods as appropriate  INTERVENTIONS:  - Monitor oral intake, urinary output, labs, and treatment plans  - Assess nutrition and hydration status and recommend course of action  - Evaluate amount of meals eaten  - Assist patient with eating if necessary   - Allow adequate time for meals  - Recommend/ encourage appropriate diets, oral nutritional supplements, and vitamin/mineral supplements  - Order, calculate, and assess calorie counts as needed  - Recommend, monitor, and adjust tube feedings and TPN/PPN based on assessed needs  - Assess need for intravenous fluids  - Provide specific nutrition/hydration education as appropriate  - Include patient/family/caregiver in decisions related to nutrition  Outcome: Progressing     Problem: NEUROSENSORY - ADULT  Goal: Achieves maximal functionality and self care  Description: INTERVENTIONS  - Monitor swallowing and airway patency with patient fatigue and changes in neurological status  - Encourage and assist patient to increase activity and self care     - Encourage visually impaired, hearing impaired and aphasic patients to use assistive/communication devices  Outcome: Progressing  Goal: Achieves stable or improved neurological status  Description: INTERVENTIONS  - Monitor and report changes in neurological status  - Monitor vital signs such as temperature, blood pressure, glucose, and any other labs ordered   - Initiate measures to prevent increased intracranial pressure  - Monitor for seizure activity and implement precautions if appropriate      Outcome: Progressing     Problem: CARDIOVASCULAR - ADULT  Goal: Maintains optimal cardiac output and hemodynamic stability  Description: INTERVENTIONS:  - Monitor I/O, vital signs and rhythm  - Monitor for S/S and trends of decreased cardiac output  - Administer and titrate ordered vasoactive medications to optimize hemodynamic stability  - Assess quality of pulses, skin color and temperature  - Assess for signs of decreased coronary artery perfusion  - Instruct patient to report change in severity of symptoms  Outcome: Progressing     Problem: RESPIRATORY - ADULT  Goal: Achieves optimal ventilation and oxygenation  Description: INTERVENTIONS:  - Assess for changes in respiratory status  - Assess for changes in mentation and behavior  - Position to facilitate oxygenation and minimize respiratory effort  - Oxygen administered by appropriate delivery if ordered  - Initiate smoking cessation education as indicated  - Encourage broncho-pulmonary hygiene including cough, deep breathe, Incentive Spirometry  - Assess the need for suctioning and aspirate as needed  - Assess and instruct to report SOB or any respiratory difficulty  - Respiratory Therapy support as indicated  Outcome: Progressing     Problem: GASTROINTESTINAL - ADULT  Goal: Maintains adequate nutritional intake  Description: INTERVENTIONS:  - Monitor percentage of each meal consumed  - Identify factors contributing to decreased intake, treat as appropriate  - Assist with meals as needed  - Monitor I&O, weight, and lab values if indicated  - Obtain nutrition services referral as needed  Outcome: Progressing     Problem: METABOLIC, FLUID AND ELECTROLYTES - ADULT  Goal: Electrolytes maintained within normal limits  Description: INTERVENTIONS:  - Monitor labs and assess patient for signs and symptoms of electrolyte imbalances  - Administer electrolyte replacement as ordered  - Monitor response to electrolyte replacements, including repeat lab results as appropriate  - Instruct patient on fluid and nutrition as appropriate  Outcome: Progressing  Goal: Fluid balance maintained  Description: INTERVENTIONS:  - Monitor labs   - Monitor I/O and WT  - Instruct patient on fluid and nutrition as appropriate  - Assess for signs & symptoms of volume excess or deficit  Outcome: Progressing     Problem: SKIN/TISSUE INTEGRITY - ADULT  Goal: Skin Integrity remains intact(Skin Breakdown Prevention)  Description: Assess:  -Perform Erickson assessment every shift  -Clean and moisturize skin every day  -Inspect skin when repositioning, toileting, and assisting with ADLS  -Assess extremities for adequate circulation and sensation     Bed Management:  -Have minimal linens on bed & keep smooth, unwrinkled  -Change linens as needed when moist or perspiring  -Avoid sitting or lying in one position for more than 2 hours while in bed  -Keep HOB at 45 degrees     Toileting:  -Offer bedside commode  -Assess for incontinence every 2 hours  -Use incontinent care products after each incontinent episode such as brief    Activity:  -Mobilize patient 3-4 times a day  -Encourage activity and walks on unit  -Encourage or provide ROM exercises   -Turn and reposition patient every 2 Hours  -Use appropriate equipment to lift or move patient in bed  -Instruct/ Assist with weight shifting every 2 when out of bed in chair  -Consider limitation of chair time 2 hour intervals    Skin Care:  -Avoid use of baby powder, tape, friction and shearing, hot water or constrictive clothing  -Relieve pressure over bony prominences using pillows  -Do not massage red bony areas    Next Steps:  -Teach patient strategies to minimize risks such as walker  -Consider consults to  interdisciplinary teams such as wound  Outcome: Progressing  Goal: Incision(s), wounds(s) or drain site(s) healing without S/S of infection  Description: INTERVENTIONS  - Assess and document dressing, incision, wound bed, drain sites and surrounding tissue  - Provide patient and family education  - Perform skin care/dressing changes every PRN  Outcome: Progressing     Problem: MUSCULOSKELETAL - ADULT  Goal: Maintain or return mobility to safest level of function  Description: INTERVENTIONS:  - Assess patient's ability to carry out ADLs; assess patient's baseline for ADL function and identify physical deficits which impact ability to perform ADLs (bathing, care of mouth/teeth, toileting, grooming, dressing, etc )  - Assess/evaluate cause of self-care deficits   - Assess range of motion  - Assess patient's mobility  - Assess patient's need for assistive devices and provide as appropriate  - Encourage maximum independence but intervene and supervise when necessary  - Involve family in performance of ADLs  - Assess for home care needs following discharge   - Consider OT consult to assist with ADL evaluation and planning for discharge  - Provide patient education as appropriate  Outcome: Progressing

## 2022-10-19 NOTE — PROGRESS NOTES
[address] [place] [address]  Progress Note - Devin Zhang 1970, 45 y o  female MRN: [id number]  Unit/Bed#: [address] 217-01 Encounter: [id number]  Primary Care Provider: Stephen Damon MD   Date and time admitted to hospital: 2022  8:04 AM    * Toxic metabolic encephalopathy  Assessment & Plan  Due to multiple metabolic derangements  MRI does show some injury  Mental status is improving, but far from normal   Will benefit from acute rehab          Bacteremia due to methicillin susceptible Staphylococcus aureus (MSSA)  Assessment & Plan  MSSA bacteremia, repeat cultures neg for greater than 72 hours  Echo with no vegetation  On IV ancef through 10/25    If rehab can use a peripheral IV, we can just place a new one prior to discharge          Subjective:   Sleepy today  No complaints        Objective:     Vitals:   Temp (24hrs), Av °F (36 7 °C), Min:96 9 °F (36 1 °C), Max:99 7 °F (37 6 °C)    Temp:  [96 9 °F (36 1 °C)-99 7 °F (37 6 °C)] 97 5 °F (36 4 °C)  HR:  [] 93  Resp:  [20] 20  BP: (117-143)/(78-90) 138/88  SpO2:  [97 %-99 %] 99 %  Body mass index is 18 4 kg/m²  Input and Output Summary (last 24 hours):     No intake or output data in the 24 hours ending 10/19/22 0959    Physical Exam:     Physical Exam  Vitals and nursing note reviewed  HENT:      Head: Normocephalic and atraumatic  Eyes:      Pupils: Pupils are equal, round, and reactive to light  Cardiovascular:      Rate and Rhythm: Normal rate and regular rhythm  Heart sounds: No murmur heard  No friction rub  No gallop  Pulmonary:      Effort: Pulmonary effort is normal       Breath sounds: Normal breath sounds  No wheezing or rales  Abdominal:      General: Bowel sounds are normal       Palpations: Abdomen is soft  Tenderness: There is no abdominal tenderness  Musculoskeletal:      Right lower leg: No edema  Left lower leg: No edema                 Additional Data:     Labs:    Results from last 7 days   Lab Units 10/19/22  0445 10/18/22  0436   WBC Thousand/uL 10 01 9 10   HEMOGLOBIN g/dL 10 7* 10 3*   HEMATOCRIT % 34 0* 32 5*   PLATELETS Thousands/uL 354 357   NEUTROS PCT %  --  67   LYMPHS PCT %  --  26   MONOS PCT %  --  5   EOS PCT %  --  1     Results from last 7 days   Lab Units 10/19/22  0445 10/18/22  0436 10/13/22  0504   POTASSIUM mmol/L 3 4*   < > 4 1   CHLORIDE mmol/L 104   < > 103   CO2 mmol/L 27   < > 26   BUN mg/dL 12   < > 18   CREATININE mg/dL 1 01   < > 1 18   CALCIUM mg/dL 9 4   < > 10 5*   ALK PHOS U/L  --   --  93   ALT U/L  --   --  9*   AST U/L  --   --  24    < > = values in this interval not displayed                         * I Have Reviewed All Lab Data     Recent Cultures (last 7 days):             Last 24 Hours Medication List:   Current Facility-Administered Medications   Medication Dose Route Frequency Provider Last Rate   • acetaminophen  650 mg Oral Q6H PRN Luz Maria Ospina DO     • ALPRAZolam  0 5 mg Oral HS PRN Luz Michel MD     • amLODIPine  10 mg Oral Daily Jorgito Zambrano MD     • cefazolin  2,000 mg Intravenous Q8H Dmitry Marino MD 2,000 mg (10/19/22 0547)   • gabapentin  300 mg Oral TID Rebecca Cali MD     • heparin (porcine)  5,000 Units Subcutaneous [facility] Suhail Adan DO     • HYDROmorphone  0 5 mg Intravenous Q4H PRN Luz Maria Ospina DO     • levalbuterol  1 25 mg Nebulization Q4H PRN Akash Paige DO     • loperamide  2 mg Oral 4x Daily PRN Luz Michel MD     • metoprolol tartrate  25 mg Oral Q12H [address] Inder Galdamez MD     • VANDANA ANTIFUNGAL   Topical BID Akash Paige DO     • nicotine  1 patch Transdermal Daily Atul Adan DO     • OLANZapine  5 mg Oral Q6H PRN Akash Paige DO     • ondansetron  4 mg Intravenous Q4H PRN Luz Maria Ospina DO     • QUEtiapine  50 mg Oral BID Luz Michel MD     • saccharomyces boulardii  250 mg Oral BID Luz Michel MD     • venlafaxine  187 5 mg Oral Daily Stefanie Garces DO VTE Pharmacologic Prophylaxis:   Pharmacologic: Heparin      Current Length of Stay: 30 day(s)    Current Patient Status: Inpatient       Discharge Plan: waiting for placement    Code Status: Level 1 - Full Code           Today, Patient Was Seen By: Que Davis    ** Please Note: Dictation voice to text software may have been used in the creation of this document   **

## 2022-10-19 NOTE — OCCUPATIONAL THERAPY NOTE
Occupational Therapy     Patient Name: Jayde Montano  Today's Date: 10/19/2022       10/19/22 2198   Note Type   Note type Cancelled Session   Cancel Reasons Other   Additional Comments Attempted to see patient for OT co-treatment this AM  Despite several attempts, pt unable to be aroused; 1:1 reports she has been sleeping all morning  Will re-attempt as able       Aleyda Calderon

## 2022-10-19 NOTE — PHYSICAL THERAPY NOTE
PHYSICAL THERAPY NOTE          Patient Name: Gildardo Zurita  [id number] Date: 10/19/2022  Time: 0950-9278       10/19/22 1407   PT Last Visit   PT Visit Date 10/19/22   Note Type   Note Type Treatment   Pain Assessment   Pain Assessment Tool FLACC   Pain Location/Orientation Orientation: Right;[place] Foot; Other (Comment)  (ankle)   Hospital Pain Intervention(s) Repositioned; Ambulation/increased activity; Emotional support; Rest   Restrictions/Precautions   Other Precautions Cognitive; Chair Alarm; Bed Alarm;1:1;Multiple lines; Fall Risk;Pain   General   Chart Reviewed Yes   Response to Previous Treatment Patient unable to report, no changes reported from family or staff   Cognition   Overall Cognitive Status Impaired   Arousal/Participation Cooperative   Attention Attends with cues to redirect   Orientation Level Oriented to person;Oriented to place;Oriented to time;Disoriented to time;Disoriented to situation  (oriented to time w options)   Memory Decreased recall of precautions;Decreased short term memory;Decreased recall of recent events   Following Commands Follows one step commands with increased time or repetition   Comments able to answer simple one word questions w increased time and repetition  difficulty expressing complex thoughts and needs  Impaired processing  Transfers   Sit to Stand 4  Minimal assistance   Additional items Assist x 1;Bedrails; Increased time required;Verbal cues   Stand to Sit 4  Minimal assistance   Additional items Assist x 1; Increased time required; Impulsive;Verbal cues   Additional Comments CGA for safety   Ambulation/Elevation   Gait pattern Improper Weight shift; Ataxia; Decreased foot clearance; Excessively slow; Inconsistent ayn;Decreased heel strike;R Foot drag   Gait Assistance 4  Minimal assist   Additional items Assist x 1;Assist x 2;Verbal cues; Tactile cues   Assistive Device Other (Comment);SPC  ([facility], [facility])   Distance 61' w [facility], 36' w [facility]   Balance   Static Standing Fair -   Dynamic Standing Poor +   Ambulatory Poor +   Endurance Deficit   Endurance Deficit No   Endurance Deficit Description vitals post amb 110/75, 100-100s, 98% on RA   Activity Tolerance   Activity Tolerance Patient limited by pain   Medical Staff Sterre [address] OT   Nurse Made Aware cleared for therapy   Exercises   Knee AROM Long Arc Quad Sitting;10 reps;Right   Ankle Pumps   (unable to perform due to pain v cognition)   Assessment   Prognosis Guarded   Problem List Decreased strength; Impaired balance;Decreased mobility; Decreased cognition; Impaired judgement;Decreased safety awareness;Pain   Assessment Noreen Price was seen for a f/u session and to update POC as appropriate  Pt demonstrates some progress towards goals including improved am-pac score, decreased level of assist for transfers and increased walking distance  Currently ambulating household distances  Continues to present w deficits of cognition, pain (distal RLE) and balance  Requires constant redirection and mobility training w limited carryover noted  Noted gait deviations secondary to foot pain resulting in need for hands on assistance and limited tolerance to walking  Attempted to introduce cane for support to offload RLE however pt w inconsistent use of AD secondary to cognition  With frequent episodes of LOB requiring therapist assist to recover  Unable to ambulate safely without external support  Would continue to benefit from therapy services to facilitate recovery and reduce fall risk  May need placement given current cognitive state w ?recovery and concern for family ability to care for patient at current LOF  Barriers to Discharge Inaccessible home environment;Decreased caregiver support   Goals   Patient Goals none stated dt cognition   STG Expiration Date 11/02/22   Short Term Goal #2 1)    Pt will perform bed mobility with S demonstrating appropriate technique 100% of the time in order to improve function  2)  Perform all transfers with S demonstrating safe and appropriate technique 100% of the time in order to improve ability to negotiate safely in home environment  3) Amb with least restrictive AD > 50'x1 with S in order to demonstrate ability to negotiate in home environment  4)  Improve overall strength and balance 1/2 grade in order to optimize ability to perform functional tasks and reduce fall risk  5) Increase activity tolerance to 45 minutes in order to improve endurance to functional tasks  6)  Negotiate stairs using most appropriate technique and min A in order to be able to negotiate safely in home environment  7) PT for ongoing patient and family/caregiver education, DME needs and d/c planning in order to promote highest level of function in least restrictive environment  PT Treatment Day 10   Plan   Treatment/Interventions Functional transfer training;LE strengthening/ROM; Elevations; Therapeutic exercise;Cognitive reorientation;Patient/family training;Equipment eval/education; Bed mobility;Gait training; Compensatory technique education;Continued evaluation;Spoke to nursing;OT   Progress Slow progress, cognitive deficits   PT Frequency Other (Comment)  (2-4x)   Recommendation   PT Discharge Recommendation Post acute rehabilitation services  (may benefit from LTC)   Nataliia Richard 435   Turning in Bed Without Bedrails 3   Lying on Back to Sitting on Edge of Flat Bed 3   Moving Bed to Chair 3   Standing Up From Chair 3   Walk in Room 3   Climb 3-5 Stairs 3   Basic Mobility Inpatient Raw Score 18   Basic Mobility Standardized Score 41 05   Highest Level Of Mobility   JH-HLM Goal 6: Walk 10 steps or more   JH-HLM Achieved 7: Walk 25 feet or more   Education   Education Provided Mobility training;Home exercise program;Assistive device   Patient Reinforcement needed   End of Consult   Patient Position at End of Consult Seated edge of bed;All needs within reach; Other (comment)  (1:1 present)       Jericho Swenson

## 2022-10-19 NOTE — PLAN OF CARE
Problem: PHYSICAL THERAPY ADULT  Goal: Performs mobility at highest level of function for planned discharge setting  See evaluation for individualized goals  Description: Treatment/Interventions: Functional transfer training, LE strengthening/ROM, Elevations, Therapeutic exercise, Cognitive reorientation, Patient/family training, Equipment eval/education, Bed mobility, Gait training, Compensatory technique education, Continued evaluation, Spoke to nursing, Spoke to case management, Spoke to MD, OT, ST  Equipment Recommended: Que Reason (if patient does not own)       See flowsheet documentation for full assessment, interventions and recommendations  Outcome: Progressing  Note: Prognosis: Guarded  Problem List: Decreased strength, Impaired balance, Decreased mobility, Decreased cognition, Impaired judgement, Decreased safety awareness, Pain  Assessment: Trip Chapin was seen for a f/u session and to update POC as appropriate  Pt demonstrates some progress towards goals including improved am-pac score, decreased level of assist for transfers and increased walking distance  Currently ambulating household distances  Continues to present w deficits of cognition, pain (distal RLE) and balance  Requires constant redirection and mobility training w limited carryover noted  Noted gait deviations secondary to foot pain resulting in need for hands on assistance and limited tolerance to walking  Attempted to introduce cane for support to offload RLE however pt w inconsistent use of AD secondary to cognition  With frequent episodes of LOB requiring therapist assist to recover  Unable to ambulate safely without external support  Would continue to benefit from therapy services to facilitate recovery and reduce fall risk  May need placement given current cognitive state w ?recovery and concern for family ability to care for patient at current LOF    Barriers to Discharge: Inaccessible home environment, Decreased caregiver support  Barriers to Discharge Comments: unable to identify at this time  PT Discharge Recommendation: Post acute rehabilitation services (may benefit from LTC)    See flowsheet documentation for full assessment

## 2022-10-19 NOTE — ASSESSMENT & PLAN NOTE
MSSA bacteremia, repeat cultures neg for greater than 72 hours  Echo with no vegetation  On IV ancef through 10/25    If rehab can use a peripheral IV, we can just place a new one prior to discharge

## 2022-10-19 NOTE — PHYSICAL THERAPY NOTE
PHYSICAL THERAPY NOTE          Patient Name: Marino Pascual  GHFPG'[id number] Date: 10/19/2022       10/19/22 0930   PT Last Visit   PT Visit Date 10/19/22   Note Type   Note Type Cancelled Session   Cancel Reasons   (Attempted PT tx however pt difficult to arouse  Attempted to awaken pt multiple times but unsuccessful  Will defer PT tx at this time   Will re-attempt at a later time if time permits )   Yao Sage

## 2022-10-20 LAB
ANION GAP SERPL CALCULATED.3IONS-SCNC: 9 MMOL/L (ref 4–13)
BUN SERPL-MCNC: 14 MG/DL (ref 5–25)
CALCIUM SERPL-MCNC: 10 MG/DL (ref 8.3–10.1)
CHLORIDE SERPL-SCNC: 105 MMOL/L (ref 96–108)
CO2 SERPL-SCNC: 25 MMOL/L (ref 21–32)
CREAT SERPL-MCNC: 1.14 MG/DL (ref 0.6–1.3)
GFR SERPL CREATININE-BSD FRML MDRD: 54 ML/MIN/1.73SQ M
GLUCOSE SERPL-MCNC: 109 MG/DL (ref 65–140)
MAGNESIUM SERPL-MCNC: 1.6 MG/DL (ref 1.6–2.6)
POTASSIUM SERPL-SCNC: 4 MMOL/L (ref 3.5–5.3)
SODIUM SERPL-SCNC: 139 MMOL/L (ref 135–147)

## 2022-10-20 RX ORDER — LORAZEPAM 2 MG/ML
0.5 INJECTION INTRAMUSCULAR ONCE
Status: COMPLETED | OUTPATIENT
Start: 2022-10-20 | End: 2022-10-20

## 2022-10-20 RX ADMIN — CEFAZOLIN SODIUM 2000 MG: 2 SOLUTION INTRAVENOUS at 05:27

## 2022-10-20 RX ADMIN — HEPARIN SODIUM 5000 UNITS: 5000 INJECTION INTRAVENOUS; SUBCUTANEOUS at 15:34

## 2022-10-20 RX ADMIN — QUETIAPINE FUMARATE 50 MG: 25 TABLET ORAL at 17:30

## 2022-10-20 RX ADMIN — Medication 250 MG: at 17:29

## 2022-10-20 RX ADMIN — CEFAZOLIN SODIUM 2000 MG: 2 SOLUTION INTRAVENOUS at 22:24

## 2022-10-20 RX ADMIN — HEPARIN SODIUM 5000 UNITS: 5000 INJECTION INTRAVENOUS; SUBCUTANEOUS at 22:25

## 2022-10-20 RX ADMIN — LORAZEPAM 0.5 MG: 2 INJECTION INTRAMUSCULAR; INTRAVENOUS at 19:44

## 2022-10-20 RX ADMIN — Medication 1 PATCH: at 08:41

## 2022-10-20 RX ADMIN — GABAPENTIN 300 MG: 300 CAPSULE ORAL at 22:25

## 2022-10-20 RX ADMIN — HEPARIN SODIUM 5000 UNITS: 5000 INJECTION INTRAVENOUS; SUBCUTANEOUS at 05:30

## 2022-10-20 RX ADMIN — VENLAFAXINE HYDROCHLORIDE 187.5 MG: 37.5 CAPSULE, EXTENDED RELEASE ORAL at 08:37

## 2022-10-20 RX ADMIN — GABAPENTIN 300 MG: 300 CAPSULE ORAL at 17:29

## 2022-10-20 RX ADMIN — METOPROLOL TARTRATE 25 MG: 25 TABLET, FILM COATED ORAL at 08:35

## 2022-10-20 RX ADMIN — Medication 250 MG: at 08:36

## 2022-10-20 RX ADMIN — GABAPENTIN 300 MG: 300 CAPSULE ORAL at 08:35

## 2022-10-20 RX ADMIN — MICONAZOLE NITRATE: 20 CREAM TOPICAL at 17:30

## 2022-10-20 RX ADMIN — CEFAZOLIN SODIUM 2000 MG: 2 SOLUTION INTRAVENOUS at 12:49

## 2022-10-20 RX ADMIN — MICONAZOLE NITRATE: 20 CREAM TOPICAL at 08:38

## 2022-10-20 RX ADMIN — QUETIAPINE FUMARATE 50 MG: 25 TABLET ORAL at 08:36

## 2022-10-20 RX ADMIN — AMLODIPINE BESYLATE 10 MG: 10 TABLET ORAL at 08:35

## 2022-10-20 RX ADMIN — METOPROLOL TARTRATE 25 MG: 25 TABLET, FILM COATED ORAL at 22:24

## 2022-10-20 NOTE — PLAN OF CARE
Problem: PAIN - ADULT  Goal: Verbalizes/displays adequate comfort level or baseline comfort level  Description: Interventions:  - Encourage patient to monitor pain and request assistance  - Assess pain using appropriate pain scale  - Administer analgesics based on type and severity of pain and evaluate response  - Implement non-pharmacological measures as appropriate and evaluate response  - Consider cultural and social influences on pain and pain management  - Notify physician/advanced practitioner if interventions unsuccessful or patient reports new pain  Outcome: Progressing     Problem: INFECTION - ADULT  Goal: Absence or prevention of progression during hospitalization  Description: INTERVENTIONS:  - Assess and monitor for signs and symptoms of infection  - Monitor lab/diagnostic results  - Monitor all insertion sites, i e  indwelling lines, tubes, and drains  - Monitor endotracheal if appropriate and nasal secretions for changes in amount and color  - [place] appropriate cooling/warming therapies per order  - Administer medications as ordered  - Instruct and encourage patient and family to use good hand hygiene technique  - Identify and instruct in appropriate isolation precautions for identified infection/condition  Outcome: Progressing     Problem: SAFETY ADULT  Goal: Patient will remain free of falls  Description: INTERVENTIONS:  - Educate patient/family on patient safety including physical limitations  - Instruct patient to call for assistance with activity   - Consult OT/PT to assist with strengthening/mobility   - Keep Call bell within reach  - Keep bed low and locked with side rails adjusted as appropriate  - Keep care items and personal belongings within reach  - Initiate and maintain comfort rounds  - Make Fall Risk Sign visible to staff  - Offer Toileting every 2 Hours, in advance of need  - Initiate/Maintain bed alarm  - Obtain necessary fall risk management equipment: walker  - Apply yellow socks and bracelet for high fall risk patients  - Consider moving patient to room near nurses station  Outcome: Progressing  Goal: Maintain or return to baseline ADL function  Description: INTERVENTIONS:  -  Assess patient's ability to carry out ADLs; assess patient's baseline for ADL function and identify physical deficits which impact ability to perform ADLs (bathing, care of mouth/teeth, toileting, grooming, dressing, etc )  - Assess/evaluate cause of self-care deficits   - Assess range of motion  - Assess patient's mobility; develop plan if impaired  - Assess patient's need for assistive devices and provide as appropriate  - Encourage maximum independence but intervene and supervise when necessary  - Involve family in performance of ADLs  - Assess for home care needs following discharge   - Consider OT consult to assist with ADL evaluation and planning for discharge  - Provide patient education as appropriate  Outcome: Progressing  Goal: Maintains/Returns to pre admission functional level  Description: INTERVENTIONS:  - Perform BMAT or MOVE assessment daily    - Set and communicate daily mobility goal to care team and patient/family/caregiver  - Collaborate with rehabilitation services on mobility goals if consulted  - Perform Range of Motion 4-6 times a day  - Reposition patient every 2 hours    - Dangle patient 3-4 times a day  - Stand patient 3 times a day  - Ambulate patient 3-4 times a day  - Out of bed for toileting  - Record patient progress and toleration of activity level   Outcome: Progressing     Problem: DISCHARGE PLANNING  Goal: Discharge to home or other facility with appropriate resources  Description: INTERVENTIONS:  - Identify barriers to discharge w/patient and caregiver  - Arrange for needed discharge resources and transportation as appropriate  - Identify discharge learning needs (meds, wound care, etc )  - Arrange for interpretive services to assist at discharge as needed  - Refer to Case Management Department for coordinating discharge planning if the patient needs post-hospital services based on physician/advanced practitioner order or complex needs related to functional status, cognitive ability, or social support system  Outcome: Progressing     Problem: Knowledge Deficit  Goal: Patient/family/caregiver demonstrates understanding of disease process, treatment plan, medications, and discharge instructions  Description: Complete learning assessment and assess knowledge base    Interventions:  - Provide teaching at level of understanding  - Provide teaching via preferred learning methods  Outcome: Progressing     Problem: Potential for Falls  Goal: Patient will remain free of falls  Description: INTERVENTIONS:  - Educate patient/family on patient safety including physical limitations  - Instruct patient to call for assistance with activity   - Consult OT/PT to assist with strengthening/mobility   - Keep Call bell within reach  - Keep bed low and locked with side rails adjusted as appropriate  - Keep care items and personal belongings within reach  - Initiate and maintain comfort rounds  - Make Fall Risk Sign visible to staff  - Offer Toileting every 2 Hours, in advance of need  - Initiate/Maintain bed alarm  - Obtain necessary fall risk management equipment: walker  - Apply yellow socks and bracelet for high fall risk patients  - Consider moving patient to room near nurses station  Outcome: Progressing     Problem: MOBILITY - ADULT  Goal: Maintain or return to baseline ADL function  Description: INTERVENTIONS:  -  Assess patient's ability to carry out ADLs; assess patient's baseline for ADL function and identify physical deficits which impact ability to perform ADLs (bathing, care of mouth/teeth, toileting, grooming, dressing, etc )  - Assess/evaluate cause of self-care deficits   - Assess range of motion  - Assess patient's mobility; develop plan if impaired  - Assess patient's need for assistive devices and provide as appropriate  - Encourage maximum independence but intervene and supervise when necessary  - Involve family in performance of ADLs  - Assess for home care needs following discharge   - Consider OT consult to assist with ADL evaluation and planning for discharge  - Provide patient education as appropriate  Outcome: Progressing  Goal: Maintains/Returns to pre admission functional level  Description: INTERVENTIONS:  - Perform BMAT or MOVE assessment daily    - Set and communicate daily mobility goal to care team and patient/family/caregiver  - Collaborate with rehabilitation services on mobility goals if consulted  - Perform Range of Motion 4-6 times a day  - Reposition patient every 2 hours  - Dangle patient 3-4 times a day  - Stand patient 3 times a day  - Ambulate patient 3-4 times a day  - Out of bed for toileting  - Record patient progress and toleration of activity level   Outcome: Progressing     Problem: Prexisting or High Potential for Compromised Skin Integrity  Goal: Skin integrity is maintained or improved  Description: INTERVENTIONS:  - Identify patients at risk for skin breakdown  - Assess and monitor skin integrity  - Assess and monitor nutrition and hydration status  - Monitor labs   - Assess for incontinence   - Turn and reposition patient  - Assist with mobility/ambulation  - Relieve pressure over bony prominences  - Avoid friction and shearing  - Provide appropriate hygiene as needed including keeping skin clean and dry  - Evaluate need for skin moisturizer/barrier cream  - Collaborate with interdisciplinary team   - Patient/family teaching  - Consider wound care consult   Outcome: Progressing     Problem: Nutrition/Hydration-ADULT  Goal: Nutrient/Hydration intake appropriate for improving, restoring or maintaining nutritional needs  Description: Monitor and assess patient's nutrition/hydration status for malnutrition   Collaborate with interdisciplinary team and initiate plan and interventions as ordered  Monitor patient's weight and dietary intake as ordered or per policy  Utilize nutrition screening tool and intervene as necessary  Determine patient's food preferences and provide high-protein, high-caloric foods as appropriate  INTERVENTIONS:  - Monitor oral intake, urinary output, labs, and treatment plans  - Assess nutrition and hydration status and recommend course of action  - Evaluate amount of meals eaten  - Assist patient with eating if necessary   - Allow adequate time for meals  - Recommend/ encourage appropriate diets, oral nutritional supplements, and vitamin/mineral supplements  - Order, calculate, and assess calorie counts as needed  - Recommend, monitor, and adjust tube feedings and TPN/PPN based on assessed needs  - Assess need for intravenous fluids  - Provide specific nutrition/hydration education as appropriate  - Include patient/family/caregiver in decisions related to nutrition  Outcome: Progressing     Problem: NEUROSENSORY - ADULT  Goal: Achieves maximal functionality and self care  Description: INTERVENTIONS  - Monitor swallowing and airway patency with patient fatigue and changes in neurological status  - Encourage and assist patient to increase activity and self care     - Encourage visually impaired, hearing impaired and aphasic patients to use assistive/communication devices  Outcome: Progressing  Goal: Achieves stable or improved neurological status  Description: INTERVENTIONS  - Monitor and report changes in neurological status  - Monitor vital signs such as temperature, blood pressure, glucose, and any other labs ordered   - Initiate measures to prevent increased intracranial pressure  - Monitor for seizure activity and implement precautions if appropriate      Outcome: Progressing     Problem: CARDIOVASCULAR - ADULT  Goal: Maintains optimal cardiac output and hemodynamic stability  Description: INTERVENTIONS:  - Monitor I/O, vital signs and rhythm  - Monitor for S/S and trends of decreased cardiac output  - Administer and titrate ordered vasoactive medications to optimize hemodynamic stability  - Assess quality of pulses, skin color and temperature  - Assess for signs of decreased coronary artery perfusion  - Instruct patient to report change in severity of symptoms  Outcome: Progressing     Problem: RESPIRATORY - ADULT  Goal: Achieves optimal ventilation and oxygenation  Description: INTERVENTIONS:  - Assess for changes in respiratory status  - Assess for changes in mentation and behavior  - Position to facilitate oxygenation and minimize respiratory effort  - Oxygen administered by appropriate delivery if ordered  - Initiate smoking cessation education as indicated  - Encourage broncho-pulmonary hygiene including cough, deep breathe, Incentive Spirometry  - Assess the need for suctioning and aspirate as needed  - Assess and instruct to report SOB or any respiratory difficulty  - Respiratory Therapy support as indicated  Outcome: Progressing     Problem: GASTROINTESTINAL - ADULT  Goal: Maintains adequate nutritional intake  Description: INTERVENTIONS:  - Monitor percentage of each meal consumed  - Identify factors contributing to decreased intake, treat as appropriate  - Assist with meals as needed  - Monitor I&O, weight, and lab values if indicated  - Obtain nutrition services referral as needed  Outcome: Progressing     Problem: METABOLIC, FLUID AND ELECTROLYTES - ADULT  Goal: Electrolytes maintained within normal limits  Description: INTERVENTIONS:  - Monitor labs and assess patient for signs and symptoms of electrolyte imbalances  - Administer electrolyte replacement as ordered  - Monitor response to electrolyte replacements, including repeat lab results as appropriate  - Instruct patient on fluid and nutrition as appropriate  Outcome: Progressing  Goal: Fluid balance maintained  Description: INTERVENTIONS:  - Monitor labs   - Monitor I/O and WT  - Instruct patient on fluid and nutrition as appropriate  - Assess for signs & symptoms of volume excess or deficit  Outcome: Progressing     Problem: SKIN/TISSUE INTEGRITY - ADULT  Goal: Skin Integrity remains intact(Skin Breakdown Prevention)  Description: Assess:  -Perform Erickson assessment every shift  -Clean and moisturize skin every day  -Inspect skin when repositioning, toileting, and assisting with ADLS  -Assess extremities for adequate circulation and sensation     Bed Management:  -Have minimal linens on bed & keep smooth, unwrinkled  -Change linens as needed when moist or perspiring  -Avoid sitting or lying in one position for more than 2 hours while in bed  -Keep HOB at 45 degrees     Toileting:  -Offer bedside commode  -Assess for incontinence every 2 hours  -Use incontinent care products after each incontinent episode such as brief    Activity:  -Mobilize patient 3-4 times a day  -Encourage activity and walks on unit  -Encourage or provide ROM exercises   -Turn and reposition patient every 2 Hours  -Use appropriate equipment to lift or move patient in bed  -Instruct/ Assist with weight shifting every 2 when out of bed in chair  -Consider limitation of chair time 2 hour intervals    Skin Care:  -Avoid use of baby powder, tape, friction and shearing, hot water or constrictive clothing  -Relieve pressure over bony prominences using pillows  -Do not massage red bony areas    Next Steps:  -Teach patient strategies to minimize risks such as walker  -Consider consults to  interdisciplinary teams such as wound  Outcome: Progressing  Goal: Incision(s), wounds(s) or drain site(s) healing without S/S of infection  Description: INTERVENTIONS  - Assess and document dressing, incision, wound bed, drain sites and surrounding tissue  - Provide patient and family education  - Perform skin care/dressing changes every PRN  Outcome: Progressing     Problem: MUSCULOSKELETAL - ADULT  Goal: Maintain or return mobility to safest level of function  Description: INTERVENTIONS:  - Assess patient's ability to carry out ADLs; assess patient's baseline for ADL function and identify physical deficits which impact ability to perform ADLs (bathing, care of mouth/teeth, toileting, grooming, dressing, etc )  - Assess/evaluate cause of self-care deficits   - Assess range of motion  - Assess patient's mobility  - Assess patient's need for assistive devices and provide as appropriate  - Encourage maximum independence but intervene and supervise when necessary  - Involve family in performance of ADLs  - Assess for home care needs following discharge   - Consider OT consult to assist with ADL evaluation and planning for discharge  - Provide patient education as appropriate  Outcome: Progressing

## 2022-10-20 NOTE — PROGRESS NOTES
Progress Note - Infectious Disease   Vidhya Pettit 45 y o  female MRN: [id number]  Unit/Bed#: [place] [address] -01 Encounter: [id number]    Impression/Plan:  1  SIRS vs Sepsis  Evolving since admission  Fever, tachycardia, and mild leukocytosis  Suspect this is likely due to MSSA bacteremia  No other clear source appreciated  COVID-19 negative   UA benign   Status post lumbar puncture for altered mental status with negative ME panel   Patient has improved with no recurrent high fever spike  No lactic acidosis  This morning she is afebrile  Her WBC count has improved  Repeat blood cultures were negative >5 days    -antibiotics as below   -monitor CBCD and BMP  -monitor vitals  -supportive care      2  MSSA bacteremia  Patient was found down for prolonged period of time with evidence of multiple wounds on admission  Suspect cutaneous vs endovascular source, likely phlebitis as there is report of RUE erythema surrounding prior IV in addition to palpable cord in left antecub  TTE negative for obvious vegetation  CHERYLE was planned but then cancelled due to tenuous respiratory status  Repeat TTE still without good visualization of aortic valve  The patient is currently receiving IV cefazolin which she is tolerating without difficulty   Anticipate 4 weeks of IV antibiotic  If she transfers out of [facility] before completion of IV antibiotic treatment she will need PICC placed vs facility agreeing to peripheral IVs   -continue IV cefazolin through 10/25/2022 for 4 weeks of antibiotic treatment after cleared blood cultures  -weekly CBCD and creatinine while on IV antibiotic  -monitor vitals  -if patient transfers to rehab before completion of IV antibiotic treatment she will need a PICC placed vs facility agreeing to peripheral IVs  -patient will require outpatient ID follow up after discharge, I will coordinate as needed and place information in discharge planning  -patient will need surveillance blood cultures 2 weeks after finishing IV antibiotic treatment, to be drawn 11/8/2022     3  Toxic Metabolic encephalopathy   Likely multifactorial etiology with uremia playing a role  This was initially thought to be secondary to gabapentin and morphine use in the setting of renal failure, hypotension, rhabdomyolysis   MRI brain negative   Lumbar puncture with opening pressure 26  Negative ME panel  CSF fluid culture not collected   The most likely to be secondary to metabolic issues, medication effect, and possible Wernicke's encephalopathy  Psychiatric issues also likely playing a role   Neurology has reassessed patient  Mental status remains significantly altered  She is being [facility] for [facility] brain injury unit   -serial neuro exams  -monitor cognition, mood, and mental status  -treat metabolic issues and psychiatric issues  -supportive care  -continue follow up with behavioral health  -continue follow up with neurology   -no additional ID workup needed     4  Acute renal failure   Creatinine on admission 5 88 with CK over 32,000  Creatinine reached plateau, now down trending   Likely multifactorial etiology in the setting of rhabdomyolysis and dehydration  Likely ANUJA/ATN    Most recent creatinine was improved to 1  14   -monitor creatinine  -dose adjust antibiotics for renal function as needed  -avoid nephrotoxins  -continue follow up with nephrology     5  Tobacco abuse   Encourage cessation as recommended by primary team   -NRT per primary service     6  Antibiotic allergy  Reports hives with penicillin  Patient has been tolerating cephalosporin without difficulty  -monitor patient for adverse medication reactions    Above plan was discussed in detail with patient at the bedside  Above plan was discussed in detail with SLIM  Antibiotics:  Cefazolin 23  Antibiotics 23    Subjective:  Per staff patient very tired today, was up and restless all night   Today she tells me she is "fine " She denies pain in her chest, abdomen, legs  She denies chills  She offers no new symptoms  Objective:  Vitals:  Temp:  [96 8 °F (36 °C)-99 9 °F (37 7 °C)] 97 7 °F (36 5 °C)  HR:  [] 104  Resp:  [18-20] 18  BP: (102-130)/(72-93) 127/93  SpO2:  [95 %-98 %] 97 %  Temp (24hrs), Av 1 °F (36 7 °C), Min:96 8 °F (36 °C), Max:99 9 °F (37 7 °C)  Current: Temperature: 97 7 °F (36 5 °C)    Physical Exam:   General Appearance:  Tired, interactive but confused  She appears comfortable laying on R side, in no acute distress  Throat: Oropharynx moist without lesions  Lungs:   Clear to auscultation bilaterally; no wheezes, rhonchi or rales; respirations unlabored on room air  Heart:  Tachycardic; no murmur, rub or gallop  Abdomen:   Soft, non-tender, non-distended, positive bowel sounds  Extremities: No clubbing or cyanosis, no edema  Skin: No new rashes, lesions, or draining wounds noted on exposed skin       Labs, Imaging, & Other studies:   All pertinent labs and imaging studies were personally reviewed  Results from last 7 days   Lab Units 10/19/22  0445 10/18/22  0436   WBC Thousand/uL 10 01 9 10   HEMOGLOBIN g/dL 10 7* 10 3*   PLATELETS Thousands/uL 354 357     Results from last 7 days   Lab Units 10/20/22  0539   POTASSIUM mmol/L 4 0   CHLORIDE mmol/L 105   CO2 mmol/L 25   BUN mg/dL 14   CREATININE mg/dL 1 14   EGFR ml/min/1 73sq m 54   CALCIUM mg/dL 10 0

## 2022-10-20 NOTE — PLAN OF CARE
Problem: PAIN - ADULT  Goal: Verbalizes/displays adequate comfort level or baseline comfort level  Description: Interventions:  - Encourage patient to monitor pain and request assistance  - Assess pain using appropriate pain scale  - Administer analgesics based on type and severity of pain and evaluate response  - Implement non-pharmacological measures as appropriate and evaluate response  - Consider cultural and social influences on pain and pain management  - Notify physician/advanced practitioner if interventions unsuccessful or patient reports new pain  Outcome: Progressing     Problem: INFECTION - ADULT  Goal: Absence or prevention of progression during hospitalization  Description: INTERVENTIONS:  - Assess and monitor for signs and symptoms of infection  - Monitor lab/diagnostic results  - Monitor all insertion sites, i e  indwelling lines, tubes, and drains  - Monitor endotracheal if appropriate and nasal secretions for changes in amount and color  - [place] appropriate cooling/warming therapies per order  - Administer medications as ordered  - Instruct and encourage patient and family to use good hand hygiene technique  - Identify and instruct in appropriate isolation precautions for identified infection/condition  Outcome: Progressing     Problem: SAFETY ADULT  Goal: Patient will remain free of falls  Description: INTERVENTIONS:  - Educate patient/family on patient safety including physical limitations  - Instruct patient to call for assistance with activity   - Consult OT/PT to assist with strengthening/mobility   - Keep Call bell within reach  - Keep bed low and locked with side rails adjusted as appropriate  - Keep care items and personal belongings within reach  - Initiate and maintain comfort rounds  - Make Fall Risk Sign visible to staff  - Offer Toileting every 2 Hours, in advance of need  - Initiate/Maintain bed alarm  - Obtain necessary fall risk management equipment: walker  - Apply yellow socks and bracelet for high fall risk patients  - Consider moving patient to room near nurses station  Outcome: Progressing  Goal: Maintain or return to baseline ADL function  Description: INTERVENTIONS:  -  Assess patient's ability to carry out ADLs; assess patient's baseline for ADL function and identify physical deficits which impact ability to perform ADLs (bathing, care of mouth/teeth, toileting, grooming, dressing, etc )  - Assess/evaluate cause of self-care deficits   - Assess range of motion  - Assess patient's mobility; develop plan if impaired  - Assess patient's need for assistive devices and provide as appropriate  - Encourage maximum independence but intervene and supervise when necessary  - Involve family in performance of ADLs  - Assess for home care needs following discharge   - Consider OT consult to assist with ADL evaluation and planning for discharge  - Provide patient education as appropriate  Outcome: Progressing  Goal: Maintains/Returns to pre admission functional level  Description: INTERVENTIONS:  - Perform BMAT or MOVE assessment daily    - Set and communicate daily mobility goal to care team and patient/family/caregiver  - Collaborate with rehabilitation services on mobility goals if consulted  - Perform Range of Motion 4-6 times a day  - Reposition patient every 2 hours    - Dangle patient 3-4 times a day  - Stand patient 3 times a day  - Ambulate patient 3-4 times a day  - Out of bed for toileting  - Record patient progress and toleration of activity level   Outcome: Progressing     Problem: DISCHARGE PLANNING  Goal: Discharge to home or other facility with appropriate resources  Description: INTERVENTIONS:  - Identify barriers to discharge w/patient and caregiver  - Arrange for needed discharge resources and transportation as appropriate  - Identify discharge learning needs (meds, wound care, etc )  - Arrange for interpretive services to assist at discharge as needed  - Refer to Case Management Department for coordinating discharge planning if the patient needs post-hospital services based on physician/advanced practitioner order or complex needs related to functional status, cognitive ability, or social support system  Outcome: Progressing     Problem: Knowledge Deficit  Goal: Patient/family/caregiver demonstrates understanding of disease process, treatment plan, medications, and discharge instructions  Description: Complete learning assessment and assess knowledge base    Interventions:  - Provide teaching at level of understanding  - Provide teaching via preferred learning methods  Outcome: Progressing     Problem: Potential for Falls  Goal: Patient will remain free of falls  Description: INTERVENTIONS:  - Educate patient/family on patient safety including physical limitations  - Instruct patient to call for assistance with activity   - Consult OT/PT to assist with strengthening/mobility   - Keep Call bell within reach  - Keep bed low and locked with side rails adjusted as appropriate  - Keep care items and personal belongings within reach  - Initiate and maintain comfort rounds  - Make Fall Risk Sign visible to staff  - Offer Toileting every 2 Hours, in advance of need  - Initiate/Maintain bed alarm  - Obtain necessary fall risk management equipment: walker  - Apply yellow socks and bracelet for high fall risk patients  - Consider moving patient to room near nurses station  Outcome: Progressing     Problem: MOBILITY - ADULT  Goal: Maintain or return to baseline ADL function  Description: INTERVENTIONS:  -  Assess patient's ability to carry out ADLs; assess patient's baseline for ADL function and identify physical deficits which impact ability to perform ADLs (bathing, care of mouth/teeth, toileting, grooming, dressing, etc )  - Assess/evaluate cause of self-care deficits   - Assess range of motion  - Assess patient's mobility; develop plan if impaired  - Assess patient's need for assistive devices and provide as appropriate  - Encourage maximum independence but intervene and supervise when necessary  - Involve family in performance of ADLs  - Assess for home care needs following discharge   - Consider OT consult to assist with ADL evaluation and planning for discharge  - Provide patient education as appropriate  Outcome: Progressing  Goal: Maintains/Returns to pre admission functional level  Description: INTERVENTIONS:  - Perform BMAT or MOVE assessment daily    - Set and communicate daily mobility goal to care team and patient/family/caregiver  - Collaborate with rehabilitation services on mobility goals if consulted  - Perform Range of Motion 4-6 times a day  - Reposition patient every 2 hours  - Dangle patient 3-4 times a day  - Stand patient 3 times a day  - Ambulate patient 3-4 times a day  - Out of bed for toileting  - Record patient progress and toleration of activity level   Outcome: Progressing     Problem: Prexisting or High Potential for Compromised Skin Integrity  Goal: Skin integrity is maintained or improved  Description: INTERVENTIONS:  - Identify patients at risk for skin breakdown  - Assess and monitor skin integrity  - Assess and monitor nutrition and hydration status  - Monitor labs   - Assess for incontinence   - Turn and reposition patient  - Assist with mobility/ambulation  - Relieve pressure over bony prominences  - Avoid friction and shearing  - Provide appropriate hygiene as needed including keeping skin clean and dry  - Evaluate need for skin moisturizer/barrier cream  - Collaborate with interdisciplinary team   - Patient/family teaching  - Consider wound care consult   Outcome: Progressing     Problem: Nutrition/Hydration-ADULT  Goal: Nutrient/Hydration intake appropriate for improving, restoring or maintaining nutritional needs  Description: Monitor and assess patient's nutrition/hydration status for malnutrition   Collaborate with interdisciplinary team and initiate plan and interventions as ordered  Monitor patient's weight and dietary intake as ordered or per policy  Utilize nutrition screening tool and intervene as necessary  Determine patient's food preferences and provide high-protein, high-caloric foods as appropriate  INTERVENTIONS:  - Monitor oral intake, urinary output, labs, and treatment plans  - Assess nutrition and hydration status and recommend course of action  - Evaluate amount of meals eaten  - Assist patient with eating if necessary   - Allow adequate time for meals  - Recommend/ encourage appropriate diets, oral nutritional supplements, and vitamin/mineral supplements  - Order, calculate, and assess calorie counts as needed  - Recommend, monitor, and adjust tube feedings and TPN/PPN based on assessed needs  - Assess need for intravenous fluids  - Provide specific nutrition/hydration education as appropriate  - Include patient/family/caregiver in decisions related to nutrition  Outcome: Progressing     Problem: NEUROSENSORY - ADULT  Goal: Achieves maximal functionality and self care  Description: INTERVENTIONS  - Monitor swallowing and airway patency with patient fatigue and changes in neurological status  - Encourage and assist patient to increase activity and self care     - Encourage visually impaired, hearing impaired and aphasic patients to use assistive/communication devices  Outcome: Progressing  Goal: Achieves stable or improved neurological status  Description: INTERVENTIONS  - Monitor and report changes in neurological status  - Monitor vital signs such as temperature, blood pressure, glucose, and any other labs ordered   - Initiate measures to prevent increased intracranial pressure  - Monitor for seizure activity and implement precautions if appropriate      Outcome: Progressing     Problem: CARDIOVASCULAR - ADULT  Goal: Maintains optimal cardiac output and hemodynamic stability  Description: INTERVENTIONS:  - Monitor I/O, vital signs and rhythm  - Monitor for S/S and trends of decreased cardiac output  - Administer and titrate ordered vasoactive medications to optimize hemodynamic stability  - Assess quality of pulses, skin color and temperature  - Assess for signs of decreased coronary artery perfusion  - Instruct patient to report change in severity of symptoms  Outcome: Progressing     Problem: RESPIRATORY - ADULT  Goal: Achieves optimal ventilation and oxygenation  Description: INTERVENTIONS:  - Assess for changes in respiratory status  - Assess for changes in mentation and behavior  - Position to facilitate oxygenation and minimize respiratory effort  - Oxygen administered by appropriate delivery if ordered  - Initiate smoking cessation education as indicated  - Encourage broncho-pulmonary hygiene including cough, deep breathe, Incentive Spirometry  - Assess the need for suctioning and aspirate as needed  - Assess and instruct to report SOB or any respiratory difficulty  - Respiratory Therapy support as indicated  Outcome: Progressing     Problem: GASTROINTESTINAL - ADULT  Goal: Maintains adequate nutritional intake  Description: INTERVENTIONS:  - Monitor percentage of each meal consumed  - Identify factors contributing to decreased intake, treat as appropriate  - Assist with meals as needed  - Monitor I&O, weight, and lab values if indicated  - Obtain nutrition services referral as needed  Outcome: Progressing     Problem: METABOLIC, FLUID AND ELECTROLYTES - ADULT  Goal: Electrolytes maintained within normal limits  Description: INTERVENTIONS:  - Monitor labs and assess patient for signs and symptoms of electrolyte imbalances  - Administer electrolyte replacement as ordered  - Monitor response to electrolyte replacements, including repeat lab results as appropriate  - Instruct patient on fluid and nutrition as appropriate  Outcome: Progressing  Goal: Fluid balance maintained  Description: INTERVENTIONS:  - Monitor labs   - Monitor I/O and WT  - Instruct patient on fluid and nutrition as appropriate  - Assess for signs & symptoms of volume excess or deficit  Outcome: Progressing     Problem: SKIN/TISSUE INTEGRITY - ADULT  Goal: Skin Integrity remains intact(Skin Breakdown Prevention)  Description: Assess:  -Perform Erickson assessment every shift  -Clean and moisturize skin every day  -Inspect skin when repositioning, toileting, and assisting with ADLS  -Assess extremities for adequate circulation and sensation     Bed Management:  -Have minimal linens on bed & keep smooth, unwrinkled  -Change linens as needed when moist or perspiring  -Avoid sitting or lying in one position for more than 2 hours while in bed  -Keep HOB at 45 degrees     Toileting:  -Offer bedside commode  -Assess for incontinence every 2 hours  -Use incontinent care products after each incontinent episode such as brief    Activity:  -Mobilize patient 3-4 times a day  -Encourage activity and walks on unit  -Encourage or provide ROM exercises   -Turn and reposition patient every 2 Hours  -Use appropriate equipment to lift or move patient in bed  -Instruct/ Assist with weight shifting every 2 when out of bed in chair  -Consider limitation of chair time 2 hour intervals    Skin Care:  -Avoid use of baby powder, tape, friction and shearing, hot water or constrictive clothing  -Relieve pressure over bony prominences using pillows  -Do not massage red bony areas    Next Steps:  -Teach patient strategies to minimize risks such as walker  -Consider consults to  interdisciplinary teams such as wound  Outcome: Progressing  Goal: Incision(s), wounds(s) or drain site(s) healing without S/S of infection  Description: INTERVENTIONS  - Assess and document dressing, incision, wound bed, drain sites and surrounding tissue  - Provide patient and family education  - Perform skin care/dressing changes every PRN  Outcome: Progressing     Problem: MUSCULOSKELETAL - ADULT  Goal: Maintain or return mobility to safest level of function  Description: INTERVENTIONS:  - Assess patient's ability to carry out ADLs; assess patient's baseline for ADL function and identify physical deficits which impact ability to perform ADLs (bathing, care of mouth/teeth, toileting, grooming, dressing, etc )  - Assess/evaluate cause of self-care deficits   - Assess range of motion  - Assess patient's mobility  - Assess patient's need for assistive devices and provide as appropriate  - Encourage maximum independence but intervene and supervise when necessary  - Involve family in performance of ADLs  - Assess for home care needs following discharge   - Consider OT consult to assist with ADL evaluation and planning for discharge  - Provide patient education as appropriate  Outcome: Progressing

## 2022-10-20 NOTE — CASE MANAGEMENT
Case Management Discharge Planning Note    Patient name Larry Powell  Location [place] [facility] [place]* MRN [id number]  : 1970 Date 10/20/2022       Current Admission Date: 2022  Current Admission Diagnosis:Toxic metabolic encephalopathy   Patient Active Problem List    Diagnosis Date Noted   • Mild protein-calorie malnutrition ([address] ) 10/09/2022   • Diarrhea 10/03/2022   • Hypokalemia 10/02/2022   • Hypernatremia 10/02/2022   • Aspiration pneumonitis ([address] ) 10/01/2022   • Pulmonary edema 2022   • Bacteremia due to methicillin susceptible Staphylococcus aureus (MSSA) 2022   • Localized swelling of right upper extremity 2022   • Leg edema, right 2022   • D-dimer, elevated 2022   • ARF (acute renal failure) ([address] ) 2022   • Transaminitis 2022   • Toxic metabolic encephalopathy    • Abnormal CT of the chest 2022   • Traumatic rhabdomyolysis ([facility] ) 2022   • Hyperglycemia 2022   • Opioid dependence ([facility] ) 2021   • Chronic right shoulder pain 2021   • Internal hemorrhoids 2020   • Adnexal cyst 2020   • Ischemic colitis ([address] ) 2020   • Intercostal neuralgia    • Hematochezia 2019   • Insomnia 2019   • History of rib fracture 10/28/2018   • Tobacco abuse 10/28/2018   • Right knee pain 2018   • Generalized anxiety disorder 2018   • Hypertension    • Hyperlipidemia    • Depression    • COPD (chronic obstructive pulmonary disease) ([facility] )    • Chondromalacia patellae 2018   • Irritable bowel syndrome with diarrhea 2018   • DDD (degenerative disc disease), lumbosacral 2014      LOS (days): 31  Geometric Mean LOS (GMLOS) (days):   Days to GMLOS:     OBJECTIVE:  Risk of Unplanned Readmission Score: 30 47         Current admission status: Inpatient   Preferred Pharmacy:   [address] [place] [address] [place] [address]  Phone: [phone] Fax: [address] [facility] [address] [place] [address]  Phone: [phone] Fax: [phone]    Primary Care Provider: Willow Rain MD    Primary Insurance: BLUE CROSS  Secondary Insurance:     DISCHARGE DETAILS:                           Contacts  Patient Contacts: [facility]  Relationship to Patient[de-identified] Family  Contact Method: Phone  Phone Number: [phone]  Reason/Outcome: Other (Comment) (update on d/c planning w/ barriers to same at this point- IV Abx through 10/25 and remains on 1:1 with [facility] able to accept on same, however not at this time    If able to accept will initiate auth on Monday 10/24 for a hopeful d/c 10/25 if approved )

## 2022-10-21 RX ADMIN — MICONAZOLE NITRATE: 20 CREAM TOPICAL at 17:17

## 2022-10-21 RX ADMIN — HEPARIN SODIUM 5000 UNITS: 5000 INJECTION INTRAVENOUS; SUBCUTANEOUS at 13:20

## 2022-10-21 RX ADMIN — METOPROLOL TARTRATE 25 MG: 25 TABLET, FILM COATED ORAL at 22:09

## 2022-10-21 RX ADMIN — HEPARIN SODIUM 5000 UNITS: 5000 INJECTION INTRAVENOUS; SUBCUTANEOUS at 05:02

## 2022-10-21 RX ADMIN — Medication 1 PATCH: at 09:00

## 2022-10-21 RX ADMIN — GABAPENTIN 300 MG: 300 CAPSULE ORAL at 17:13

## 2022-10-21 RX ADMIN — CEFAZOLIN SODIUM 2000 MG: 2 SOLUTION INTRAVENOUS at 05:02

## 2022-10-21 RX ADMIN — METOPROLOL TARTRATE 25 MG: 25 TABLET, FILM COATED ORAL at 08:59

## 2022-10-21 RX ADMIN — CEFAZOLIN SODIUM 2000 MG: 2 SOLUTION INTRAVENOUS at 13:21

## 2022-10-21 RX ADMIN — QUETIAPINE FUMARATE 50 MG: 25 TABLET ORAL at 09:00

## 2022-10-21 RX ADMIN — GABAPENTIN 300 MG: 300 CAPSULE ORAL at 22:10

## 2022-10-21 RX ADMIN — HEPARIN SODIUM 5000 UNITS: 5000 INJECTION INTRAVENOUS; SUBCUTANEOUS at 22:12

## 2022-10-21 RX ADMIN — AMLODIPINE BESYLATE 10 MG: 10 TABLET ORAL at 08:59

## 2022-10-21 RX ADMIN — GABAPENTIN 300 MG: 300 CAPSULE ORAL at 08:59

## 2022-10-21 RX ADMIN — Medication 250 MG: at 17:13

## 2022-10-21 RX ADMIN — Medication 250 MG: at 08:59

## 2022-10-21 RX ADMIN — MICONAZOLE NITRATE: 20 CREAM TOPICAL at 09:09

## 2022-10-21 RX ADMIN — QUETIAPINE FUMARATE 50 MG: 25 TABLET ORAL at 17:13

## 2022-10-21 RX ADMIN — CEFAZOLIN SODIUM 2000 MG: 2 SOLUTION INTRAVENOUS at 22:12

## 2022-10-21 RX ADMIN — VENLAFAXINE HYDROCHLORIDE 187.5 MG: 37.5 CAPSULE, EXTENDED RELEASE ORAL at 09:01

## 2022-10-21 NOTE — PROGRESS NOTES
[facility]  Progress Note - Jordan Case 1970, 45 y o  female MRN: [id number]  Unit/Bed#: [address] Encounter: [id number]  Primary Care Provider: Britton Hernández MD   Date and time admitted to hospital: 2022  8:04 AM    * Toxic metabolic encephalopathy  Assessment & Plan  Due to multiple metabolic derangements  MRI does show some injury  Mental status is improving, but far from normal   Will benefit from acute rehab          Bacteremia due to methicillin susceptible Staphylococcus aureus (MSSA)  Assessment & Plan  MSSA bacteremia, repeat cultures neg for greater than 72 hours  Echo with no vegetation  On IV ancef through 10/25    So plan is acute rehab on 10/25            Subjective:   Resting comfortably  She is not agitated  She is staying in bed  Objective:     Vitals:   Temp (24hrs), Av 6 °F (37 °C), Min:98 5 °F (36 9 °C), Max:98 6 °F (37 °C)    Temp:  [98 5 °F (36 9 °C)-98 6 °F (37 °C)] 98 6 °F (37 °C)  HR:  [110-129] 110  Resp:  [16] 16  BP: ()/(72-86) 125/86  SpO2:  [91 %-99 %] 93 %  Body mass index is 18 4 kg/m²  Input and Output Summary (last 24 hours):     No intake or output data in the 24 hours ending 10/21/22 1047    Physical Exam:     Physical Exam  Vitals and nursing note reviewed  HENT:      Head: Normocephalic and atraumatic  Eyes:      Pupils: Pupils are equal, round, and reactive to light  Cardiovascular:      Rate and Rhythm: Normal rate and regular rhythm  Heart sounds: No murmur heard  No friction rub  No gallop  Pulmonary:      Effort: Pulmonary effort is normal       Breath sounds: Normal breath sounds  No wheezing or rales  Abdominal:      General: Bowel sounds are normal       Palpations: Abdomen is soft  Tenderness: There is no abdominal tenderness  Musculoskeletal:      Right lower leg: No edema  Left lower leg: No edema                 Additional Data:     Labs:    Results from last 7 days Lab Units 10/19/22  0445 10/18/22  0436   WBC Thousand/uL 10 01 9 10   HEMOGLOBIN g/dL 10 7* 10 3*   HEMATOCRIT % 34 0* 32 5*   PLATELETS Thousands/uL 354 357   NEUTROS PCT %  --  67   LYMPHS PCT %  --  26   MONOS PCT %  --  5   EOS PCT %  --  1     Results from last 7 days   Lab Units 10/20/22  0539   POTASSIUM mmol/L 4 0   CHLORIDE mmol/L 105   CO2 mmol/L 25   BUN mg/dL 14   CREATININE mg/dL 1 14   CALCIUM mg/dL 10 0                       * I Have Reviewed All Lab Data     Recent Cultures (last 7 days):             Last 24 Hours Medication List:   Current Facility-Administered Medications   Medication Dose Route Frequency Provider Last Rate   • acetaminophen  650 mg Oral Q6H PRN Harris Reece DO     • ALPRAZolam  0 5 mg Oral HS PRN Renetta Stone MD     • amLODIPine  10 mg Oral Daily Joaquim Oleary MD     • cefazolin  2,000 mg Intravenous Q8H Viridiana Pritchard MD Stopped (10/21/22 0532)   • gabapentin  300 mg Oral TID Aguila Granger MD     • heparin (porcine)  5,000 Units Subcutaneous [facility] Sherice Adan, DO     • HYDROmorphone  0 5 mg Intravenous Q4H PRN Harris Reece DO     • levalbuterol  1 25 mg Nebulization Q4H PRN Vicente Tarango DO     • loperamide  2 mg Oral 4x Daily PRN Renetta Stone MD     • metoprolol tartrate  25 mg Oral Q12H [address] Inder Lambert MD     • VANDANA ANTIFUNGAL   Topical BID Guanakito Steiner DO     • nicotine  1 patch Transdermal Daily Atul Adan DO     • OLANZapine  5 mg Oral Q6H PRN Vicente Tarango DO     • ondansetron  4 mg Intravenous Q4H PRN Harris Reece DO     • QUEtiapine  50 mg Oral BID Renetta Stone MD     • saccharomyces boulardii  250 mg Oral BID Renetta Stone MD     • venlafaxine  187 5 mg Oral Daily Yovanny Garces DO           VTE Pharmacologic Prophylaxis:   Pharmacologic: Heparin      Current Length of Stay: 32 day(s)    Current Patient Status: Inpatient       Discharge Plan: acute rehab on 10/25 when abx are complete    Code Status: Level 1 - Full Code           Today, Patient Was Seen By: Zulay Arriaga    ** Please Note: Dictation voice to text software may have been used in the creation of this document   **

## 2022-10-21 NOTE — PLAN OF CARE
Problem: PAIN - ADULT  Goal: Verbalizes/displays adequate comfort level or baseline comfort level  Description: Interventions:  - Encourage patient to monitor pain and request assistance  - Assess pain using appropriate pain scale  - Administer analgesics based on type and severity of pain and evaluate response  - Implement non-pharmacological measures as appropriate and evaluate response  - Consider cultural and social influences on pain and pain management  - Notify physician/advanced practitioner if interventions unsuccessful or patient reports new pain  Outcome: Progressing     Problem: INFECTION - ADULT  Goal: Absence or prevention of progression during hospitalization  Description: INTERVENTIONS:  - Assess and monitor for signs and symptoms of infection  - Monitor lab/diagnostic results  - Monitor all insertion sites, i e  indwelling lines, tubes, and drains  - Monitor endotracheal if appropriate and nasal secretions for changes in amount and color  - [place] appropriate cooling/warming therapies per order  - Administer medications as ordered  - Instruct and encourage patient and family to use good hand hygiene technique  - Identify and instruct in appropriate isolation precautions for identified infection/condition  Outcome: Progressing     Problem: SAFETY ADULT  Goal: Patient will remain free of falls  Description: INTERVENTIONS:  - Educate patient/family on patient safety including physical limitations  - Instruct patient to call for assistance with activity   - Consult OT/PT to assist with strengthening/mobility   - Keep Call bell within reach  - Keep bed low and locked with side rails adjusted as appropriate  - Keep care items and personal belongings within reach  - Initiate and maintain comfort rounds  - Make Fall Risk Sign visible to staff  - Offer Toileting every 2 Hours, in advance of need  - Initiate/Maintain bed alarm  - Obtain necessary fall risk management equipment: walker  - Apply yellow socks and bracelet for high fall risk patients  - Consider moving patient to room near nurses station  Outcome: Progressing  Goal: Maintain or return to baseline ADL function  Description: INTERVENTIONS:  -  Assess patient's ability to carry out ADLs; assess patient's baseline for ADL function and identify physical deficits which impact ability to perform ADLs (bathing, care of mouth/teeth, toileting, grooming, dressing, etc )  - Assess/evaluate cause of self-care deficits   - Assess range of motion  - Assess patient's mobility; develop plan if impaired  - Assess patient's need for assistive devices and provide as appropriate  - Encourage maximum independence but intervene and supervise when necessary  - Involve family in performance of ADLs  - Assess for home care needs following discharge   - Consider OT consult to assist with ADL evaluation and planning for discharge  - Provide patient education as appropriate  Outcome: Progressing  Goal: Maintains/Returns to pre admission functional level  Description: INTERVENTIONS:  - Perform BMAT or MOVE assessment daily    - Set and communicate daily mobility goal to care team and patient/family/caregiver  - Collaborate with rehabilitation services on mobility goals if consulted  - Perform Range of Motion 4-6 times a day  - Reposition patient every 2 hours    - Dangle patient 3-4 times a day  - Stand patient 3 times a day  - Ambulate patient 3-4 times a day  - Out of bed for toileting  - Record patient progress and toleration of activity level   Outcome: Progressing     Problem: DISCHARGE PLANNING  Goal: Discharge to home or other facility with appropriate resources  Description: INTERVENTIONS:  - Identify barriers to discharge w/patient and caregiver  - Arrange for needed discharge resources and transportation as appropriate  - Identify discharge learning needs (meds, wound care, etc )  - Arrange for interpretive services to assist at discharge as needed  - Refer to Case Management Department for coordinating discharge planning if the patient needs post-hospital services based on physician/advanced practitioner order or complex needs related to functional status, cognitive ability, or social support system  Outcome: Progressing     Problem: Knowledge Deficit  Goal: Patient/family/caregiver demonstrates understanding of disease process, treatment plan, medications, and discharge instructions  Description: Complete learning assessment and assess knowledge base    Interventions:  - Provide teaching at level of understanding  - Provide teaching via preferred learning methods  Outcome: Progressing     Problem: Potential for Falls  Goal: Patient will remain free of falls  Description: INTERVENTIONS:  - Educate patient/family on patient safety including physical limitations  - Instruct patient to call for assistance with activity   - Consult OT/PT to assist with strengthening/mobility   - Keep Call bell within reach  - Keep bed low and locked with side rails adjusted as appropriate  - Keep care items and personal belongings within reach  - Initiate and maintain comfort rounds  - Make Fall Risk Sign visible to staff  - Offer Toileting every 2 Hours, in advance of need  - Initiate/Maintain bed alarm  - Obtain necessary fall risk management equipment: walker  - Apply yellow socks and bracelet for high fall risk patients  - Consider moving patient to room near nurses station  Outcome: Progressing     Problem: MOBILITY - ADULT  Goal: Maintain or return to baseline ADL function  Description: INTERVENTIONS:  -  Assess patient's ability to carry out ADLs; assess patient's baseline for ADL function and identify physical deficits which impact ability to perform ADLs (bathing, care of mouth/teeth, toileting, grooming, dressing, etc )  - Assess/evaluate cause of self-care deficits   - Assess range of motion  - Assess patient's mobility; develop plan if impaired  - Assess patient's need for assistive devices and provide as appropriate  - Encourage maximum independence but intervene and supervise when necessary  - Involve family in performance of ADLs  - Assess for home care needs following discharge   - Consider OT consult to assist with ADL evaluation and planning for discharge  - Provide patient education as appropriate  Outcome: Progressing  Goal: Maintains/Returns to pre admission functional level  Description: INTERVENTIONS:  - Perform BMAT or MOVE assessment daily    - Set and communicate daily mobility goal to care team and patient/family/caregiver  - Collaborate with rehabilitation services on mobility goals if consulted  - Perform Range of Motion 4-6 times a day  - Reposition patient every 2 hours  - Dangle patient 3-4 times a day  - Stand patient 3 times a day  - Ambulate patient 3-4 times a day  - Out of bed for toileting  - Record patient progress and toleration of activity level   Outcome: Progressing     Problem: Prexisting or High Potential for Compromised Skin Integrity  Goal: Skin integrity is maintained or improved  Description: INTERVENTIONS:  - Identify patients at risk for skin breakdown  - Assess and monitor skin integrity  - Assess and monitor nutrition and hydration status  - Monitor labs   - Assess for incontinence   - Turn and reposition patient  - Assist with mobility/ambulation  - Relieve pressure over bony prominences  - Avoid friction and shearing  - Provide appropriate hygiene as needed including keeping skin clean and dry  - Evaluate need for skin moisturizer/barrier cream  - Collaborate with interdisciplinary team   - Patient/family teaching  - Consider wound care consult   Outcome: Progressing     Problem: Nutrition/Hydration-ADULT  Goal: Nutrient/Hydration intake appropriate for improving, restoring or maintaining nutritional needs  Description: Monitor and assess patient's nutrition/hydration status for malnutrition   Collaborate with interdisciplinary team and initiate plan and interventions as ordered  Monitor patient's weight and dietary intake as ordered or per policy  Utilize nutrition screening tool and intervene as necessary  Determine patient's food preferences and provide high-protein, high-caloric foods as appropriate  INTERVENTIONS:  - Monitor oral intake, urinary output, labs, and treatment plans  - Assess nutrition and hydration status and recommend course of action  - Evaluate amount of meals eaten  - Assist patient with eating if necessary   - Allow adequate time for meals  - Recommend/ encourage appropriate diets, oral nutritional supplements, and vitamin/mineral supplements  - Order, calculate, and assess calorie counts as needed  - Recommend, monitor, and adjust tube feedings and TPN/PPN based on assessed needs  - Assess need for intravenous fluids  - Provide specific nutrition/hydration education as appropriate  - Include patient/family/caregiver in decisions related to nutrition  Outcome: Progressing     Problem: NEUROSENSORY - ADULT  Goal: Achieves maximal functionality and self care  Description: INTERVENTIONS  - Monitor swallowing and airway patency with patient fatigue and changes in neurological status  - Encourage and assist patient to increase activity and self care     - Encourage visually impaired, hearing impaired and aphasic patients to use assistive/communication devices  Outcome: Progressing  Goal: Achieves stable or improved neurological status  Description: INTERVENTIONS  - Monitor and report changes in neurological status  - Monitor vital signs such as temperature, blood pressure, glucose, and any other labs ordered   - Initiate measures to prevent increased intracranial pressure  - Monitor for seizure activity and implement precautions if appropriate      Outcome: Progressing     Problem: CARDIOVASCULAR - ADULT  Goal: Maintains optimal cardiac output and hemodynamic stability  Description: INTERVENTIONS:  - Monitor I/O, vital signs and rhythm  - Monitor for S/S and trends of decreased cardiac output  - Administer and titrate ordered vasoactive medications to optimize hemodynamic stability  - Assess quality of pulses, skin color and temperature  - Assess for signs of decreased coronary artery perfusion  - Instruct patient to report change in severity of symptoms  Outcome: Progressing     Problem: RESPIRATORY - ADULT  Goal: Achieves optimal ventilation and oxygenation  Description: INTERVENTIONS:  - Assess for changes in respiratory status  - Assess for changes in mentation and behavior  - Position to facilitate oxygenation and minimize respiratory effort  - Oxygen administered by appropriate delivery if ordered  - Initiate smoking cessation education as indicated  - Encourage broncho-pulmonary hygiene including cough, deep breathe, Incentive Spirometry  - Assess the need for suctioning and aspirate as needed  - Assess and instruct to report SOB or any respiratory difficulty  - Respiratory Therapy support as indicated  Outcome: Progressing     Problem: GASTROINTESTINAL - ADULT  Goal: Maintains adequate nutritional intake  Description: INTERVENTIONS:  - Monitor percentage of each meal consumed  - Identify factors contributing to decreased intake, treat as appropriate  - Assist with meals as needed  - Monitor I&O, weight, and lab values if indicated  - Obtain nutrition services referral as needed  Outcome: Progressing     Problem: METABOLIC, FLUID AND ELECTROLYTES - ADULT  Goal: Electrolytes maintained within normal limits  Description: INTERVENTIONS:  - Monitor labs and assess patient for signs and symptoms of electrolyte imbalances  - Administer electrolyte replacement as ordered  - Monitor response to electrolyte replacements, including repeat lab results as appropriate  - Instruct patient on fluid and nutrition as appropriate  Outcome: Progressing  Goal: Fluid balance maintained  Description: INTERVENTIONS:  - Monitor labs   - Monitor I/O and WT  - Instruct patient on fluid and nutrition as appropriate  - Assess for signs & symptoms of volume excess or deficit  Outcome: Progressing     Problem: SKIN/TISSUE INTEGRITY - ADULT  Goal: Skin Integrity remains intact(Skin Breakdown Prevention)  Description: Assess:  -Perform Erickson assessment every shift  -Clean and moisturize skin every day  -Inspect skin when repositioning, toileting, and assisting with ADLS  -Assess extremities for adequate circulation and sensation     Bed Management:  -Have minimal linens on bed & keep smooth, unwrinkled  -Change linens as needed when moist or perspiring  -Avoid sitting or lying in one position for more than 2 hours while in bed  -Keep HOB at 45 degrees     Toileting:  -Offer bedside commode  -Assess for incontinence every 2 hours  -Use incontinent care products after each incontinent episode such as brief    Activity:  -Mobilize patient 3-4 times a day  -Encourage activity and walks on unit  -Encourage or provide ROM exercises   -Turn and reposition patient every 2 Hours  -Use appropriate equipment to lift or move patient in bed  -Instruct/ Assist with weight shifting every 2 when out of bed in chair  -Consider limitation of chair time 2 hour intervals    Skin Care:  -Avoid use of baby powder, tape, friction and shearing, hot water or constrictive clothing  -Relieve pressure over bony prominences using pillows  -Do not massage red bony areas    Next Steps:  -Teach patient strategies to minimize risks such as walker  -Consider consults to  interdisciplinary teams such as wound  Outcome: Progressing  Goal: Incision(s), wounds(s) or drain site(s) healing without S/S of infection  Description: INTERVENTIONS  - Assess and document dressing, incision, wound bed, drain sites and surrounding tissue  - Provide patient and family education  - Perform skin care/dressing changes every PRN  Outcome: Progressing     Problem: MUSCULOSKELETAL - ADULT  Goal: Maintain or return mobility to safest level of function  Description: INTERVENTIONS:  - Assess patient's ability to carry out ADLs; assess patient's baseline for ADL function and identify physical deficits which impact ability to perform ADLs (bathing, care of mouth/teeth, toileting, grooming, dressing, etc )  - Assess/evaluate cause of self-care deficits   - Assess range of motion  - Assess patient's mobility  - Assess patient's need for assistive devices and provide as appropriate  - Encourage maximum independence but intervene and supervise when necessary  - Involve family in performance of ADLs  - Assess for home care needs following discharge   - Consider OT consult to assist with ADL evaluation and planning for discharge  - Provide patient education as appropriate  Outcome: Progressing

## 2022-10-21 NOTE — PROGRESS NOTES
Progress Note - Infectious Disease   Tracee Mcclelland 45 y o  female MRN: [id number]  Unit/Bed#: [place] [address] -01 Encounter: [id number]      Impression/Plan:  1  MSSA bacteremia-with suspected cutaneous versus phlebitis source as the patient had right upper extremity erythema surrounding a prior IV site in addition to a palpable cord left antecubital region  Transthoracic echocardiogram negative without vegetation however the aortic valve is not well visualized   The bacteremia is cleared and the patient remains hemodynamically stable   She seems to be tolerating the antibiotics without difficulty   Unable to safely do transesophageal echocardiogram by report  -continue cefazolin through 10/25/2022 to complete 4 weeks of IV antibiotics  -recheck CBC with diff and creatinine at least weekly while on the IV antibiotics  -supportive care  -possible transferred to [facility] brain unit soon    2  Toxic Metabolic encephalopathy   Likely multifactorial etiology with uremia playing a role  This was initially thought to be secondary to gabapentin and morphine use in the setting of renal failure, hypotension, rhabdomyolysis   MRI brain negative   Lumbar puncture with opening pressure 26  Negative ME panel  CSF fluid culture not collected   The most likely to be secondary to metabolic issues, medication effect, and possible Wernicke's encephalopathy  Psychiatric issues also likely playing a role   Neurology has reassessed patient  Mental status remains significantly altered  She is being considered for [facility] brain injury unit   -serial neuro exams  -treat metabolic issues and psychiatric issues  -continue follow up with behavioral health  -continue follow up with neurology   -no additional ID workup needed     3  Acute renal failure   Creatinine on admission 5 88 with CK over 32,000  Creatinine reached plateau, now down trending   Likely multifactorial etiology in the setting of rhabdomyolysis and dehydration  Likely ANUJA/ATN    Most recent creatinine was improved to 1  14   -monitor creatinine  -dose adjust antibiotics for renal function as needed  -avoid nephrotoxins  -continue follow up with nephrology     4  Tobacco abuse   Encourage cessation as recommended by primary team   -NRT per primary service     5  Antibiotic allergy  Reports hives with penicillin  Patient has been tolerating cephalosporin without difficulty  -monitor patient for adverse medication reactions    Discussed the above management plan with the primary service    Will see the patient again 10/24/2022 if not discharged  Please call if questions    Antibiotics:  Cefazolin 24  Total antibiotics 24    Subjective:  Patient has no fever, chills, sweats; no nausea, vomiting, still having intermittent loose stool; no cough, shortness of breath; no pain  No new symptoms  Objective:  Vitals:  Temp:  [98 5 °F (36 9 °C)-98 6 °F (37 °C)] 98 6 °F (37 °C)  HR:  [110-129] 110  Resp:  [16-18] 18  BP: ()/(72-86) 125/86  SpO2:  [91 %-99 %] 93 %  Temp (24hrs), Av 6 °F (37 °C), Min:98 5 °F (36 9 °C), Max:98 6 °F (37 °C)  Current: Temperature: 98 6 °F (37 °C)    Physical Exam:   General Appearance:  Somnolent, arousable, nontoxic, no acute distress  Throat: Oropharynx moist without lesions  Lungs:   Clear to auscultation bilaterally; no wheezes, rhonchi or rales; respirations unlabored   Heart:  Tachycardic; no murmur, rub or gallop   Abdomen:   Soft, non-tender, non-distended, positive bowel sounds  Extremities: No clubbing, cyanosis or edema   Skin: No new rashes or lesions  No draining wounds noted         Labs, Imaging, & Other studies:   All pertinent labs and imaging studies were personally reviewed  Results from last 7 days   Lab Units 10/19/22  0445 10/18/22  0436   WBC Thousand/uL 10 01 9 10   HEMOGLOBIN g/dL 10 7* 10 3*   PLATELETS Thousands/uL 354 357     Results from last 7 days   Lab Units 10/20/22  0539 10/19/22  0445 10/18/22  0436 SODIUM mmol/L 139 140 139   POTASSIUM mmol/L 4 0 3 4* 3 3*   CHLORIDE mmol/L 105 104 102   CO2 mmol/L 25 27 28   BUN mg/dL 14 12 12   CREATININE mg/dL 1 14 1 01 1 02   EGFR ml/min/1 73sq m 54 63 62   CALCIUM mg/dL 10 0 9 4 9 7

## 2022-10-21 NOTE — UTILIZATION REVIEW
Continued Stay Review    Date: 10/18                   Current Patient Class: IP  Current Level of Care: MS    HPI:52 y o  female initially admitted on 9/19  Sepsis, MSSA bacteremia    Assessment/Plan:   Will be on IV antibiotics through 10/25  Remains on continual observation  She is awake and alert  Toxic metabolic encephalopathy - LP and MRI brain negative for infection   MRI does show severe periventricular and white matter hyperintensities   Neurology planning repeat MRI in 3 months  Trying an increase in Effexor to see is that improves her mood  She was sleepy on exam today  Walking in [place]  Answers questions with one or 2 word sentences  Pt would benefit from acute rehab setting post d/c         Vital Signs:   Row Name 10/18/22 0800 10/18/22 06:52:58 10/17/22 22:43:19   Temp -- 96 7 °F (35 9 °C) Abnormal   -DI 99 4 °F (37 4 °C)  -DI   Temp src -- -- Oral  -MSA   Pulse -- 113 Abnormal   -DI 98  -DI   Heart Rate Source -- -- Monitor  -MSA   Resp -- 20  -DI --   BP -- 121/76  -/80  -DI   MAP (mmHg) -- 91  -DI 94  -DI     Row Name 10/19/22 07:09:34 10/18/22 21:13:02 10/18/22 15:31:58   Temp 97 5 °F (36 4 °C)  -DI 99 7 °F (37 6 °C)  -DI 96 9 °F (36 1 °C) Abnormal   -DI   Pulse 93  -  - Abnormal   -DI   Resp -- -- 20  -DI   /88  -/90  -/78  -DI   MAP (mmHg) 105  -  -DI 91  -DI         Pertinent Labs/Diagnostic Results:       Results from last 7 days   Lab Units 10/19/22  0445 10/18/22  0436   WBC Thousand/uL 10 01 9 10   HEMOGLOBIN g/dL 10 7* 10 3*   HEMATOCRIT % 34 0* 32 5*   PLATELETS Thousands/uL 354 357   NEUTROS ABS Thousands/µL  --  6 08         Results from last 7 days   Lab Units 10/20/22  0539 10/19/22  0445 10/18/22  0436   SODIUM mmol/L 139 140 139   POTASSIUM mmol/L 4 0 3 4* 3 3*   CHLORIDE mmol/L 105 104 102   CO2 mmol/L 25 27 28   ANION GAP mmol/L 9 9 9   BUN mg/dL 14 12 12   CREATININE mg/dL 1 14 1 01 1 02   EGFR ml/min/1 73sq m 54 63 62   CALCIUM mg/dL 10 0 9 4 9 7   MAGNESIUM mg/dL 1 6 1 4* 1 4*             Results from last 7 days   Lab Units 10/20/22  0539 10/19/22  0445 10/18/22  0436   GLUCOSE RANDOM mg/dL 109 97 91     Medications:   Scheduled Medications:  amLODIPine, 10 mg, Oral, Daily  cefazolin, 2,000 mg, Intravenous, Q8H  gabapentin, 300 mg, Oral, TID  heparin (porcine), 5,000 Units, Subcutaneous, Q8H CHOCO  metoprolol tartrate, 25 mg, Oral, Q12H [facility]  VANDANA ANTIFUNGAL, , Topical, BID  nicotine, 1 patch, Transdermal, Daily  QUEtiapine, 50 mg, Oral, BID  saccharomyces boulardii, 250 mg, Oral, BID  venlafaxine, 187 5 mg, Oral, Daily      Continuous IV Infusions:     PRN Meds:  acetaminophen, 650 mg, Oral, Q6H PRN  ALPRAZolam, 0 5 mg, Oral, HS PRN  HYDROmorphone, 0 5 mg, Intravenous, Q4H PRN  levalbuterol, 1 25 mg, Nebulization, Q4H PRN  loperamide, 2 mg, Oral, 4x Daily PRN  OLANZapine, 5 mg, Oral, Q6H PRN - x 1 10/18  ondansetron, 4 mg, Intravenous, Q4H PRN    Discharge Plan: acute rehab    Network Utilization Review Department  ATTENTION: Please call with any questions or concerns to [phone] and carefully listen to the prompts so that you are directed to the right person  All voicemails are confidential   Irineo Pool all requests for admission clinical reviews, approved or denied determinations and any other requests to dedicated fax number below belonging to the campus where the patient is receiving treatment   List of dedicated fax numbers for the Facilities:  [address] DENIALS (Administrative/Medical Necessity) [phone]   [address] [place] [address] (Maternity/NICU/Pediatrics) [phone]   [address] [phone]   [facility] [phone]   [facility] [phone]   [address] [place]  [address]  [phone]   [address] [place] [address] [phone]   [address] [phone]

## 2022-10-21 NOTE — ASSESSMENT & PLAN NOTE
MSSA bacteremia, repeat cultures neg for greater than 72 hours  Echo with no vegetation  On IV ancef through 10/25    So plan is acute rehab on 10/25

## 2022-10-22 RX ADMIN — QUETIAPINE FUMARATE 50 MG: 25 TABLET ORAL at 09:05

## 2022-10-22 RX ADMIN — METOPROLOL TARTRATE 25 MG: 25 TABLET, FILM COATED ORAL at 09:05

## 2022-10-22 RX ADMIN — Medication 250 MG: at 18:41

## 2022-10-22 RX ADMIN — ALPRAZOLAM 0.5 MG: 0.5 TABLET ORAL at 22:34

## 2022-10-22 RX ADMIN — VENLAFAXINE HYDROCHLORIDE 187.5 MG: 37.5 CAPSULE, EXTENDED RELEASE ORAL at 09:05

## 2022-10-22 RX ADMIN — QUETIAPINE FUMARATE 50 MG: 25 TABLET ORAL at 18:41

## 2022-10-22 RX ADMIN — HEPARIN SODIUM 5000 UNITS: 5000 INJECTION INTRAVENOUS; SUBCUTANEOUS at 13:09

## 2022-10-22 RX ADMIN — GABAPENTIN 300 MG: 300 CAPSULE ORAL at 09:05

## 2022-10-22 RX ADMIN — GABAPENTIN 300 MG: 300 CAPSULE ORAL at 16:26

## 2022-10-22 RX ADMIN — CEFAZOLIN SODIUM 2000 MG: 2 SOLUTION INTRAVENOUS at 13:08

## 2022-10-22 RX ADMIN — MICONAZOLE NITRATE: 20 CREAM TOPICAL at 09:05

## 2022-10-22 RX ADMIN — HEPARIN SODIUM 5000 UNITS: 5000 INJECTION INTRAVENOUS; SUBCUTANEOUS at 05:23

## 2022-10-22 RX ADMIN — GABAPENTIN 300 MG: 300 CAPSULE ORAL at 22:34

## 2022-10-22 RX ADMIN — CEFAZOLIN SODIUM 2000 MG: 2 SOLUTION INTRAVENOUS at 22:34

## 2022-10-22 RX ADMIN — CEFAZOLIN SODIUM 2000 MG: 2 SOLUTION INTRAVENOUS at 04:43

## 2022-10-22 RX ADMIN — Medication 250 MG: at 09:05

## 2022-10-22 RX ADMIN — METOPROLOL TARTRATE 25 MG: 25 TABLET, FILM COATED ORAL at 22:34

## 2022-10-22 RX ADMIN — AMLODIPINE BESYLATE 10 MG: 10 TABLET ORAL at 09:05

## 2022-10-22 RX ADMIN — Medication 1 PATCH: at 09:05

## 2022-10-22 RX ADMIN — HEPARIN SODIUM 5000 UNITS: 5000 INJECTION INTRAVENOUS; SUBCUTANEOUS at 22:35

## 2022-10-22 NOTE — PROGRESS NOTES
[facility]  Progress Note - Gildardo Zurita 1970, 45 y o  female MRN: [id number]  Unit/Bed#: [place] [address] -01 Encounter: [id number]  Primary Care Provider: Rosalind Angel MD   Date and time admitted to hospital: 2022  8:04 AM    * Toxic metabolic encephalopathy  Assessment & Plan  Due to multiple metabolic derangements  MRI does show some injury  Mental status continues to improve  Plan for [facility] on 10/25          Bacteremia due to methicillin susceptible Staphylococcus aureus (MSSA)  Assessment & Plan  MSSA bacteremia, repeat cultures neg for greater than 72 hours  Echo with no vegetation  On IV ancef through 10/25    So plan is acute rehab on 10/25          Subjective:   Doing better  Much more calm  Not getting out of bed on his own  Objective:     Vitals:   Temp (24hrs), Av 5 °F (36 9 °C), Min:98 2 °F (36 8 °C), Max:98 6 °F (37 °C)    Temp:  [98 2 °F (36 8 °C)-98 6 °F (37 °C)] 98 6 °F (37 °C)  HR:  [106-117] 106  Resp:  [18] 18  BP: (124-131)/(80-89) 131/89  SpO2:  [96 %-98 %] 97 %  Body mass index is 18 4 kg/m²  Input and Output Summary (last 24 hours):     No intake or output data in the 24 hours ending 10/22/22 1103    Physical Exam:     Physical Exam  Vitals and nursing note reviewed  HENT:      Head: Normocephalic and atraumatic  Eyes:      Pupils: Pupils are equal, round, and reactive to light  Cardiovascular:      Rate and Rhythm: Normal rate and regular rhythm  Heart sounds: No murmur heard  No friction rub  No gallop  Pulmonary:      Effort: Pulmonary effort is normal       Breath sounds: Normal breath sounds  No wheezing or rales  Abdominal:      General: Bowel sounds are normal       Palpations: Abdomen is soft  Tenderness: There is no abdominal tenderness  Musculoskeletal:      Right lower leg: No edema  Left lower leg: No edema                 Additional Data:     Labs:    Results from last 7 days   Lab Units 10/19/22  0445 10/18/22  0436   WBC Thousand/uL 10 01 9 10   HEMOGLOBIN g/dL 10 7* 10 3*   HEMATOCRIT % 34 0* 32 5*   PLATELETS Thousands/uL 354 357   NEUTROS PCT %  --  67   LYMPHS PCT %  --  26   MONOS PCT %  --  5   EOS PCT %  --  1     Results from last 7 days   Lab Units 10/20/22  0539   POTASSIUM mmol/L 4 0   CHLORIDE mmol/L 105   CO2 mmol/L 25   BUN mg/dL 14   CREATININE mg/dL 1 14   CALCIUM mg/dL 10 0                       * I Have Reviewed All Lab Data     Recent Cultures (last 7 days):             Last 24 Hours Medication List:   Current Facility-Administered Medications   Medication Dose Route Frequency Provider Last Rate   • acetaminophen  650 mg Oral Q6H PRN [place] Clipper, DO     • ALPRAZolam  0 5 mg Oral HS PRN Jelani Singh MD     • amLODIPine  10 mg Oral Daily Selena Muñoz MD     • cefazolin  2,000 mg Intravenous Q8H Francie Mcdaniel MD 2,000 mg (10/22/22 0443)   • gabapentin  300 mg Oral TID Elaine Olivarez MD     • heparin (porcine)  5,000 Units Subcutaneous [facility] Karina Adan, DO     • HYDROmorphone  0 5 mg Intravenous Q4H PRN [place] Clipper, DO     • levalbuterol  1 25 mg Nebulization Q4H PRN Taye Bhatt, DO     • loperamide  2 mg Oral 4x Daily PRN Jelani Singh MD     • metoprolol tartrate  25 mg Oral Q12H [address] Inder Kenny MD     • VANDANA ANTIFUNGAL   Topical BID Deb Adams, DO     • nicotine  1 patch Transdermal Daily Atul Adan DO     • OLANZapine  5 mg Oral Q6H PRN Taye Bhatt DO     • ondansetron  4 mg Intravenous Q4H PRN [place] Clipper, DO     • QUEtiapine  50 mg Oral BID Jelani Singh MD     • saccharomyces boulardii  250 mg Oral BID Jelani Singh MD     • venlafaxine  187 5 mg Oral Daily Yovanny Garces, DO           VTE Pharmacologic Prophylaxis:   Pharmacologic: Heparin      Current Length of Stay: 33 day(s)    Current Patient Status: Inpatient       Discharge Plan: plan on rehab 1025    Code Status: Level 1 - Full Code           Today, Patient Was Seen By: Livia Cohen    ** Please Note: Dictation voice to text software may have been used in the creation of this document   **

## 2022-10-22 NOTE — PLAN OF CARE
Problem: PAIN - ADULT  Goal: Verbalizes/displays adequate comfort level or baseline comfort level  Description: Interventions:  - Encourage patient to monitor pain and request assistance  - Assess pain using appropriate pain scale  - Administer analgesics based on type and severity of pain and evaluate response  - Implement non-pharmacological measures as appropriate and evaluate response  - Consider cultural and social influences on pain and pain management  - Notify physician/advanced practitioner if interventions unsuccessful or patient reports new pain  Outcome: Progressing     Problem: INFECTION - ADULT  Goal: Absence or prevention of progression during hospitalization  Description: INTERVENTIONS:  - Assess and monitor for signs and symptoms of infection  - Monitor lab/diagnostic results  - Monitor all insertion sites, i e  indwelling lines, tubes, and drains  - Monitor endotracheal if appropriate and nasal secretions for changes in amount and color  - [place] appropriate cooling/warming therapies per order  - Administer medications as ordered  - Instruct and encourage patient and family to use good hand hygiene technique  - Identify and instruct in appropriate isolation precautions for identified infection/condition  Outcome: Progressing     Problem: SAFETY ADULT  Goal: Patient will remain free of falls  Description: INTERVENTIONS:  - Educate patient/family on patient safety including physical limitations  - Instruct patient to call for assistance with activity   - Consult OT/PT to assist with strengthening/mobility   - Keep Call bell within reach  - Keep bed low and locked with side rails adjusted as appropriate  - Keep care items and personal belongings within reach  - Initiate and maintain comfort rounds  - Make Fall Risk Sign visible to staff  - Offer Toileting every 2 Hours, in advance of need  - Initiate/Maintain bed alarm  - Obtain necessary fall risk management equipment: walker  - Apply yellow socks and bracelet for high fall risk patients  - Consider moving patient to room near nurses station  Outcome: Progressing  Goal: Maintain or return to baseline ADL function  Description: INTERVENTIONS:  -  Assess patient's ability to carry out ADLs; assess patient's baseline for ADL function and identify physical deficits which impact ability to perform ADLs (bathing, care of mouth/teeth, toileting, grooming, dressing, etc )  - Assess/evaluate cause of self-care deficits   - Assess range of motion  - Assess patient's mobility; develop plan if impaired  - Assess patient's need for assistive devices and provide as appropriate  - Encourage maximum independence but intervene and supervise when necessary  - Involve family in performance of ADLs  - Assess for home care needs following discharge   - Consider OT consult to assist with ADL evaluation and planning for discharge  - Provide patient education as appropriate  Outcome: Progressing  Goal: Maintains/Returns to pre admission functional level  Description: INTERVENTIONS:  - Perform BMAT or MOVE assessment daily    - Set and communicate daily mobility goal to care team and patient/family/caregiver  - Collaborate with rehabilitation services on mobility goals if consulted  - Perform Range of Motion 4-6 times a day  - Reposition patient every 2 hours    - Dangle patient 3-4 times a day  - Stand patient 3 times a day  - Ambulate patient 3-4 times a day  - Out of bed for toileting  - Record patient progress and toleration of activity level   Outcome: Progressing     Problem: DISCHARGE PLANNING  Goal: Discharge to home or other facility with appropriate resources  Description: INTERVENTIONS:  - Identify barriers to discharge w/patient and caregiver  - Arrange for needed discharge resources and transportation as appropriate  - Identify discharge learning needs (meds, wound care, etc )  - Arrange for interpretive services to assist at discharge as needed  - Refer to Case Management Department for coordinating discharge planning if the patient needs post-hospital services based on physician/advanced practitioner order or complex needs related to functional status, cognitive ability, or social support system  Outcome: Progressing     Problem: Knowledge Deficit  Goal: Patient/family/caregiver demonstrates understanding of disease process, treatment plan, medications, and discharge instructions  Description: Complete learning assessment and assess knowledge base    Interventions:  - Provide teaching at level of understanding  - Provide teaching via preferred learning methods  Outcome: Progressing     Problem: Potential for Falls  Goal: Patient will remain free of falls  Description: INTERVENTIONS:  - Educate patient/family on patient safety including physical limitations  - Instruct patient to call for assistance with activity   - Consult OT/PT to assist with strengthening/mobility   - Keep Call bell within reach  - Keep bed low and locked with side rails adjusted as appropriate  - Keep care items and personal belongings within reach  - Initiate and maintain comfort rounds  - Make Fall Risk Sign visible to staff  - Offer Toileting every 2 Hours, in advance of need  - Initiate/Maintain bed alarm  - Obtain necessary fall risk management equipment: walker  - Apply yellow socks and bracelet for high fall risk patients  - Consider moving patient to room near nurses station  Outcome: Progressing     Problem: MOBILITY - ADULT  Goal: Maintain or return to baseline ADL function  Description: INTERVENTIONS:  -  Assess patient's ability to carry out ADLs; assess patient's baseline for ADL function and identify physical deficits which impact ability to perform ADLs (bathing, care of mouth/teeth, toileting, grooming, dressing, etc )  - Assess/evaluate cause of self-care deficits   - Assess range of motion  - Assess patient's mobility; develop plan if impaired  - Assess patient's need for assistive devices and provide as appropriate  - Encourage maximum independence but intervene and supervise when necessary  - Involve family in performance of ADLs  - Assess for home care needs following discharge   - Consider OT consult to assist with ADL evaluation and planning for discharge  - Provide patient education as appropriate  Outcome: Progressing  Goal: Maintains/Returns to pre admission functional level  Description: INTERVENTIONS:  - Perform BMAT or MOVE assessment daily    - Set and communicate daily mobility goal to care team and patient/family/caregiver  - Collaborate with rehabilitation services on mobility goals if consulted  - Perform Range of Motion 4-6 times a day  - Reposition patient every 2 hours  - Dangle patient 3-4 times a day  - Stand patient 3 times a day  - Ambulate patient 3-4 times a day  - Out of bed for toileting  - Record patient progress and toleration of activity level   Outcome: Progressing     Problem: Prexisting or High Potential for Compromised Skin Integrity  Goal: Skin integrity is maintained or improved  Description: INTERVENTIONS:  - Identify patients at risk for skin breakdown  - Assess and monitor skin integrity  - Assess and monitor nutrition and hydration status  - Monitor labs   - Assess for incontinence   - Turn and reposition patient  - Assist with mobility/ambulation  - Relieve pressure over bony prominences  - Avoid friction and shearing  - Provide appropriate hygiene as needed including keeping skin clean and dry  - Evaluate need for skin moisturizer/barrier cream  - Collaborate with interdisciplinary team   - Patient/family teaching  - Consider wound care consult   Outcome: Progressing     Problem: Nutrition/Hydration-ADULT  Goal: Nutrient/Hydration intake appropriate for improving, restoring or maintaining nutritional needs  Description: Monitor and assess patient's nutrition/hydration status for malnutrition   Collaborate with interdisciplinary team and initiate plan and interventions as ordered  Monitor patient's weight and dietary intake as ordered or per policy  Utilize nutrition screening tool and intervene as necessary  Determine patient's food preferences and provide high-protein, high-caloric foods as appropriate  INTERVENTIONS:  - Monitor oral intake, urinary output, labs, and treatment plans  - Assess nutrition and hydration status and recommend course of action  - Evaluate amount of meals eaten  - Assist patient with eating if necessary   - Allow adequate time for meals  - Recommend/ encourage appropriate diets, oral nutritional supplements, and vitamin/mineral supplements  - Order, calculate, and assess calorie counts as needed  - Recommend, monitor, and adjust tube feedings and TPN/PPN based on assessed needs  - Assess need for intravenous fluids  - Provide specific nutrition/hydration education as appropriate  - Include patient/family/caregiver in decisions related to nutrition  Outcome: Progressing     Problem: NEUROSENSORY - ADULT  Goal: Achieves maximal functionality and self care  Description: INTERVENTIONS  - Monitor swallowing and airway patency with patient fatigue and changes in neurological status  - Encourage and assist patient to increase activity and self care     - Encourage visually impaired, hearing impaired and aphasic patients to use assistive/communication devices  Outcome: Progressing  Goal: Achieves stable or improved neurological status  Description: INTERVENTIONS  - Monitor and report changes in neurological status  - Monitor vital signs such as temperature, blood pressure, glucose, and any other labs ordered   - Initiate measures to prevent increased intracranial pressure  - Monitor for seizure activity and implement precautions if appropriate      Outcome: Progressing     Problem: CARDIOVASCULAR - ADULT  Goal: Maintains optimal cardiac output and hemodynamic stability  Description: INTERVENTIONS:  - Monitor I/O, vital signs and rhythm  - Monitor for S/S and trends of decreased cardiac output  - Administer and titrate ordered vasoactive medications to optimize hemodynamic stability  - Assess quality of pulses, skin color and temperature  - Assess for signs of decreased coronary artery perfusion  - Instruct patient to report change in severity of symptoms  Outcome: Progressing     Problem: RESPIRATORY - ADULT  Goal: Achieves optimal ventilation and oxygenation  Description: INTERVENTIONS:  - Assess for changes in respiratory status  - Assess for changes in mentation and behavior  - Position to facilitate oxygenation and minimize respiratory effort  - Oxygen administered by appropriate delivery if ordered  - Initiate smoking cessation education as indicated  - Encourage broncho-pulmonary hygiene including cough, deep breathe, Incentive Spirometry  - Assess the need for suctioning and aspirate as needed  - Assess and instruct to report SOB or any respiratory difficulty  - Respiratory Therapy support as indicated  Outcome: Progressing     Problem: GASTROINTESTINAL - ADULT  Goal: Maintains adequate nutritional intake  Description: INTERVENTIONS:  - Monitor percentage of each meal consumed  - Identify factors contributing to decreased intake, treat as appropriate  - Assist with meals as needed  - Monitor I&O, weight, and lab values if indicated  - Obtain nutrition services referral as needed  Outcome: Progressing     Problem: METABOLIC, FLUID AND ELECTROLYTES - ADULT  Goal: Electrolytes maintained within normal limits  Description: INTERVENTIONS:  - Monitor labs and assess patient for signs and symptoms of electrolyte imbalances  - Administer electrolyte replacement as ordered  - Monitor response to electrolyte replacements, including repeat lab results as appropriate  - Instruct patient on fluid and nutrition as appropriate  Outcome: Progressing  Goal: Fluid balance maintained  Description: INTERVENTIONS:  - Monitor labs   - Monitor I/O and WT  - Instruct patient on fluid and nutrition as appropriate  - Assess for signs & symptoms of volume excess or deficit  Outcome: Progressing     Problem: SKIN/TISSUE INTEGRITY - ADULT  Goal: Skin Integrity remains intact(Skin Breakdown Prevention)  Description: Assess:  -Perform Erickson assessment every shift  -Clean and moisturize skin every day  -Inspect skin when repositioning, toileting, and assisting with ADLS  -Assess extremities for adequate circulation and sensation     Bed Management:  -Have minimal linens on bed & keep smooth, unwrinkled  -Change linens as needed when moist or perspiring  -Avoid sitting or lying in one position for more than 2 hours while in bed  -Keep HOB at 45 degrees     Toileting:  -Offer bedside commode  -Assess for incontinence every 2 hours  -Use incontinent care products after each incontinent episode such as brief    Activity:  -Mobilize patient 3-4 times a day  -Encourage activity and walks on unit  -Encourage or provide ROM exercises   -Turn and reposition patient every 2 Hours  -Use appropriate equipment to lift or move patient in bed  -Instruct/ Assist with weight shifting every 2 when out of bed in chair  -Consider limitation of chair time 2 hour intervals    Skin Care:  -Avoid use of baby powder, tape, friction and shearing, hot water or constrictive clothing  -Relieve pressure over bony prominences using pillows  -Do not massage red bony areas    Next Steps:  -Teach patient strategies to minimize risks such as walker  -Consider consults to  interdisciplinary teams such as wound  Outcome: Progressing  Goal: Incision(s), wounds(s) or drain site(s) healing without S/S of infection  Description: INTERVENTIONS  - Assess and document dressing, incision, wound bed, drain sites and surrounding tissue  - Provide patient and family education  - Perform skin care/dressing changes every PRN  Outcome: Progressing     Problem: MUSCULOSKELETAL - ADULT  Goal: Maintain or return mobility to safest level of function  Description: INTERVENTIONS:  - Assess patient's ability to carry out ADLs; assess patient's baseline for ADL function and identify physical deficits which impact ability to perform ADLs (bathing, care of mouth/teeth, toileting, grooming, dressing, etc )  - Assess/evaluate cause of self-care deficits   - Assess range of motion  - Assess patient's mobility  - Assess patient's need for assistive devices and provide as appropriate  - Encourage maximum independence but intervene and supervise when necessary  - Involve family in performance of ADLs  - Assess for home care needs following discharge   - Consider OT consult to assist with ADL evaluation and planning for discharge  - Provide patient education as appropriate  Outcome: Progressing

## 2022-10-22 NOTE — ASSESSMENT & PLAN NOTE
Due to multiple metabolic derangements  MRI does show some injury  Mental status continues to improve    Plan for [facility] on 10/25

## 2022-10-23 RX ADMIN — HEPARIN SODIUM 5000 UNITS: 5000 INJECTION INTRAVENOUS; SUBCUTANEOUS at 05:06

## 2022-10-23 RX ADMIN — HEPARIN SODIUM 5000 UNITS: 5000 INJECTION INTRAVENOUS; SUBCUTANEOUS at 21:57

## 2022-10-23 RX ADMIN — METOPROLOL TARTRATE 25 MG: 25 TABLET, FILM COATED ORAL at 09:08

## 2022-10-23 RX ADMIN — METOPROLOL TARTRATE 25 MG: 25 TABLET, FILM COATED ORAL at 22:04

## 2022-10-23 RX ADMIN — QUETIAPINE FUMARATE 50 MG: 25 TABLET ORAL at 16:19

## 2022-10-23 RX ADMIN — CEFAZOLIN SODIUM 2000 MG: 2 SOLUTION INTRAVENOUS at 05:05

## 2022-10-23 RX ADMIN — VENLAFAXINE HYDROCHLORIDE 187.5 MG: 37.5 CAPSULE, EXTENDED RELEASE ORAL at 09:11

## 2022-10-23 RX ADMIN — Medication 1 PATCH: at 09:09

## 2022-10-23 RX ADMIN — ONDANSETRON 4 MG: 2 INJECTION INTRAMUSCULAR; INTRAVENOUS at 21:57

## 2022-10-23 RX ADMIN — AMLODIPINE BESYLATE 10 MG: 10 TABLET ORAL at 09:08

## 2022-10-23 RX ADMIN — GABAPENTIN 300 MG: 300 CAPSULE ORAL at 09:09

## 2022-10-23 RX ADMIN — Medication 250 MG: at 09:08

## 2022-10-23 RX ADMIN — HEPARIN SODIUM 5000 UNITS: 5000 INJECTION INTRAVENOUS; SUBCUTANEOUS at 14:03

## 2022-10-23 RX ADMIN — ALPRAZOLAM 0.5 MG: 0.5 TABLET ORAL at 21:57

## 2022-10-23 RX ADMIN — GABAPENTIN 300 MG: 300 CAPSULE ORAL at 21:57

## 2022-10-23 RX ADMIN — Medication 250 MG: at 16:18

## 2022-10-23 RX ADMIN — QUETIAPINE FUMARATE 50 MG: 25 TABLET ORAL at 09:09

## 2022-10-23 RX ADMIN — CEFAZOLIN SODIUM 2000 MG: 2 SOLUTION INTRAVENOUS at 14:03

## 2022-10-23 RX ADMIN — MICONAZOLE NITRATE: 20 CREAM TOPICAL at 16:20

## 2022-10-23 RX ADMIN — MICONAZOLE NITRATE: 20 CREAM TOPICAL at 09:09

## 2022-10-23 RX ADMIN — GABAPENTIN 300 MG: 300 CAPSULE ORAL at 16:19

## 2022-10-23 RX ADMIN — CEFAZOLIN SODIUM 2000 MG: 2 SOLUTION INTRAVENOUS at 21:57

## 2022-10-23 NOTE — ASSESSMENT & PLAN NOTE
MSSA bacteremia, repeat cultures neg for greater than 72 hours  Echo with no vegetation  On IV ancef through 10/25    So plan is transfer to  acute rehab on 10/25 after the abx course is complete

## 2022-10-23 NOTE — PROGRESS NOTES
[facility]  Progress Note - Caryle Harsh 1970, 45 y o  female MRN: [id number]  Unit/Bed#: [place] 2 -01 Encounter: [id number]  Primary Care Provider: Humberto Bustamante MD   Date and time admitted to hospital: 2022  8:04 AM    * Toxic metabolic encephalopathy  Assessment & Plan  Due to multiple metabolic derangements, home medications morphine and gabapentin in the setting of acute kidney injury  She is improving, but still has a lot of mental confusion  MRI does show some likely brain injury  She is on IV abx for bacteremia  Plan for [facility] rehab on 10/25 after the antibiotic course is complete          Bacteremia due to methicillin susceptible Staphylococcus aureus (MSSA)  Assessment & Plan  MSSA bacteremia, repeat cultures neg for greater than 72 hours  Echo with no vegetation  On IV ancef through 10/25    So plan is transfer to  acute rehab on 10/25 after the abx course is complete    ARF (acute renal failure) ([facility] [address] )  Assessment & Plan  · ANUJA/ATN secondary to rhabdomyolysis  No evidence of renal obstruction on imaging    Kidney function has returned to normal    Results from last 7 days   Lab Units 10/20/22  0539 10/19/22  0445 10/18/22  0436   BUN mg/dL 14 12 12   CREATININE mg/dL 1 14 1 01 1 02   EGFR ml/min/1 73sq m 54 63 62             Subjective:   She is much more common  Sitting up watching TV  Had a small conversation with me  No complaints  Objective:     Vitals:   Temp (24hrs), Av 4 °F (36 9 °C), Min:98 3 °F (36 8 °C), Max:98 4 °F (36 9 °C)    Temp:  [98 3 °F (36 8 °C)-98 4 °F (36 9 °C)] 98 4 °F (36 9 °C)  HR:  [] 103  Resp:  [16-20] 16  BP: ()/() 90/64  SpO2:  [95 %-99 %] 99 %  Body mass index is 18 4 kg/m²  Input and Output Summary (last 24 hours):        Intake/Output Summary (Last 24 hours) at 10/23/2022 1423  Last data filed at 10/22/2022 2322  Gross per 24 hour   Intake 1787 ml   Output --   Net 1787 ml       Physical Exam:     Physical Exam  Vitals and nursing note reviewed  HENT:      Head: Normocephalic and atraumatic  Eyes:      Pupils: Pupils are equal, round, and reactive to light  Cardiovascular:      Rate and Rhythm: Normal rate and regular rhythm  Heart sounds: No murmur heard  No friction rub  No gallop  Pulmonary:      Effort: Pulmonary effort is normal       Breath sounds: Normal breath sounds  No wheezing or rales  Abdominal:      General: Bowel sounds are normal       Palpations: Abdomen is soft  Tenderness: There is no abdominal tenderness  Musculoskeletal:      Right lower leg: No edema  Left lower leg: No edema                 Additional Data:     Labs:    Results from last 7 days   Lab Units 10/19/22  0445 10/18/22  0436   WBC Thousand/uL 10 01 9 10   HEMOGLOBIN g/dL 10 7* 10 3*   HEMATOCRIT % 34 0* 32 5*   PLATELETS Thousands/uL 354 357   NEUTROS PCT %  --  67   LYMPHS PCT %  --  26   MONOS PCT %  --  5   EOS PCT %  --  1     Results from last 7 days   Lab Units 10/20/22  0539   POTASSIUM mmol/L 4 0   CHLORIDE mmol/L 105   CO2 mmol/L 25   BUN mg/dL 14   CREATININE mg/dL 1 14   CALCIUM mg/dL 10 0                       * I Have Reviewed All Lab Data     Recent Cultures (last 7 days):             Last 24 Hours Medication List:   Current Facility-Administered Medications   Medication Dose Route Frequency Provider Last Rate   • acetaminophen  650 mg Oral Q6H PRN Jono Diehl DO     • ALPRAZolam  0 5 mg Oral HS PRN Matthew Villar MD     • amLODIPine  10 mg Oral Daily Selena Whitney MD     • cefazolin  2,000 mg Intravenous Q8H Matthew Felton MD 2,000 mg (10/23/22 1403)   • gabapentin  300 mg Oral TID Sandra Zuleta MD     • heparin (porcine)  5,000 Units Subcutaneous [facility] Nedra Adan, DO     • HYDROmorphone  0 5 mg Intravenous Q4H PRN Jono Diehl DO     • levalbuterol  1 25 mg Nebulization Q4H PRN Radha Armstrong DO     • loperamide  2 mg Oral 4x Daily PRN Inder Chong Bobbi Maxwell MD     • metoprolol tartrate  25 mg Oral Q12H [address] Inder Bello MD     • VANDANA ANTIFUNGAL   Topical BID Pati Bolton, DO     • nicotine  1 patch Transdermal Daily Atul Adan, DO     • OLANZapine  5 mg Oral Q6H PRN Pati Bolton DO     • ondansetron  4 mg Intravenous Q4H PRN Janny Abrams DO     • QUEtiapine  50 mg Oral BID Myranda Robin MD     • saccharomyces boulardii  250 mg Oral BID Myranda Robin MD     • venlafaxine  187 5 mg Oral Daily Alejandro Garces,            VTE Pharmacologic Prophylaxis:   Pharmacologic: Heparin      Current Length of Stay: 34 day(s)    Current Patient Status: Inpatient       Discharge Plan: She needs IV abx until 10/25  Plan acute rehab transfer after that    Code Status: Level 1 - Full Code           Today, Patient Was Seen By: Chani Ventura    ** Please Note: Dictation voice to text software may have been used in the creation of this document   **

## 2022-10-23 NOTE — ASSESSMENT & PLAN NOTE
· ANUJA/ATN secondary to rhabdomyolysis    No evidence of renal obstruction on imaging    Kidney function has returned to normal    Results from last 7 days   Lab Units 10/20/22  0539 10/19/22  0445 10/18/22  0436   BUN mg/dL 14 12 12   CREATININE mg/dL 1 14 1 01 1 02   EGFR ml/min/1 73sq m 54 63 62

## 2022-10-23 NOTE — PLAN OF CARE
Problem: PAIN - ADULT  Goal: Verbalizes/displays adequate comfort level or baseline comfort level  Description: Interventions:  - Encourage patient to monitor pain and request assistance  - Assess pain using appropriate pain scale  - Administer analgesics based on type and severity of pain and evaluate response  - Implement non-pharmacological measures as appropriate and evaluate response  - Consider cultural and social influences on pain and pain management  - Notify physician/advanced practitioner if interventions unsuccessful or patient reports new pain  Outcome: Progressing     Problem: INFECTION - ADULT  Goal: Absence or prevention of progression during hospitalization  Description: INTERVENTIONS:  - Assess and monitor for signs and symptoms of infection  - Monitor lab/diagnostic results  - Monitor all insertion sites, i e  indwelling lines, tubes, and drains  - Monitor endotracheal if appropriate and nasal secretions for changes in amount and color  - [place] appropriate cooling/warming therapies per order  - Administer medications as ordered  - Instruct and encourage patient and family to use good hand hygiene technique  - Identify and instruct in appropriate isolation precautions for identified infection/condition  Outcome: Progressing     Problem: SAFETY ADULT  Goal: Patient will remain free of falls  Description: INTERVENTIONS:  - Educate patient/family on patient safety including physical limitations  - Instruct patient to call for assistance with activity   - Consult OT/PT to assist with strengthening/mobility   - Keep Call bell within reach  - Keep bed low and locked with side rails adjusted as appropriate  - Keep care items and personal belongings within reach  - Initiate and maintain comfort rounds  - Make Fall Risk Sign visible to staff  - Offer Toileting every 2 Hours, in advance of need  - Initiate/Maintain bed alarm  - Obtain necessary fall risk management equipment: walker  - Apply yellow socks and bracelet for high fall risk patients  - Consider moving patient to room near nurses station  Outcome: Progressing  Goal: Maintain or return to baseline ADL function  Description: INTERVENTIONS:  -  Assess patient's ability to carry out ADLs; assess patient's baseline for ADL function and identify physical deficits which impact ability to perform ADLs (bathing, care of mouth/teeth, toileting, grooming, dressing, etc )  - Assess/evaluate cause of self-care deficits   - Assess range of motion  - Assess patient's mobility; develop plan if impaired  - Assess patient's need for assistive devices and provide as appropriate  - Encourage maximum independence but intervene and supervise when necessary  - Involve family in performance of ADLs  - Assess for home care needs following discharge   - Consider OT consult to assist with ADL evaluation and planning for discharge  - Provide patient education as appropriate  Outcome: Progressing  Goal: Maintains/Returns to pre admission functional level  Description: INTERVENTIONS:  - Perform BMAT or MOVE assessment daily    - Set and communicate daily mobility goal to care team and patient/family/caregiver  - Collaborate with rehabilitation services on mobility goals if consulted  - Perform Range of Motion 4-6 times a day  - Reposition patient every 2 hours    - Dangle patient 3-4 times a day  - Stand patient 3 times a day  - Ambulate patient 3-4 times a day  - Out of bed for toileting  - Record patient progress and toleration of activity level   Outcome: Progressing     Problem: DISCHARGE PLANNING  Goal: Discharge to home or other facility with appropriate resources  Description: INTERVENTIONS:  - Identify barriers to discharge w/patient and caregiver  - Arrange for needed discharge resources and transportation as appropriate  - Identify discharge learning needs (meds, wound care, etc )  - Arrange for interpretive services to assist at discharge as needed  - Refer to Case Management Department for coordinating discharge planning if the patient needs post-hospital services based on physician/advanced practitioner order or complex needs related to functional status, cognitive ability, or social support system  Outcome: Progressing     Problem: Knowledge Deficit  Goal: Patient/family/caregiver demonstrates understanding of disease process, treatment plan, medications, and discharge instructions  Description: Complete learning assessment and assess knowledge base    Interventions:  - Provide teaching at level of understanding  - Provide teaching via preferred learning methods  Outcome: Progressing     Problem: Potential for Falls  Goal: Patient will remain free of falls  Description: INTERVENTIONS:  - Educate patient/family on patient safety including physical limitations  - Instruct patient to call for assistance with activity   - Consult OT/PT to assist with strengthening/mobility   - Keep Call bell within reach  - Keep bed low and locked with side rails adjusted as appropriate  - Keep care items and personal belongings within reach  - Initiate and maintain comfort rounds  - Make Fall Risk Sign visible to staff  - Offer Toileting every 2 Hours, in advance of need  - Initiate/Maintain bed alarm  - Obtain necessary fall risk management equipment: walker  - Apply yellow socks and bracelet for high fall risk patients  - Consider moving patient to room near nurses station  Outcome: Progressing     Problem: MOBILITY - ADULT  Goal: Maintain or return to baseline ADL function  Description: INTERVENTIONS:  -  Assess patient's ability to carry out ADLs; assess patient's baseline for ADL function and identify physical deficits which impact ability to perform ADLs (bathing, care of mouth/teeth, toileting, grooming, dressing, etc )  - Assess/evaluate cause of self-care deficits   - Assess range of motion  - Assess patient's mobility; develop plan if impaired  - Assess patient's need for assistive devices and provide as appropriate  - Encourage maximum independence but intervene and supervise when necessary  - Involve family in performance of ADLs  - Assess for home care needs following discharge   - Consider OT consult to assist with ADL evaluation and planning for discharge  - Provide patient education as appropriate  Outcome: Progressing  Goal: Maintains/Returns to pre admission functional level  Description: INTERVENTIONS:  - Perform BMAT or MOVE assessment daily    - Set and communicate daily mobility goal to care team and patient/family/caregiver  - Collaborate with rehabilitation services on mobility goals if consulted  - Perform Range of Motion 4-6 times a day  - Reposition patient every 2 hours  - Dangle patient 3-4 times a day  - Stand patient 3 times a day  - Ambulate patient 3-4 times a day  - Out of bed for toileting  - Record patient progress and toleration of activity level   Outcome: Progressing     Problem: Prexisting or High Potential for Compromised Skin Integrity  Goal: Skin integrity is maintained or improved  Description: INTERVENTIONS:  - Identify patients at risk for skin breakdown  - Assess and monitor skin integrity  - Assess and monitor nutrition and hydration status  - Monitor labs   - Assess for incontinence   - Turn and reposition patient  - Assist with mobility/ambulation  - Relieve pressure over bony prominences  - Avoid friction and shearing  - Provide appropriate hygiene as needed including keeping skin clean and dry  - Evaluate need for skin moisturizer/barrier cream  - Collaborate with interdisciplinary team   - Patient/family teaching  - Consider wound care consult   Outcome: Progressing     Problem: Nutrition/Hydration-ADULT  Goal: Nutrient/Hydration intake appropriate for improving, restoring or maintaining nutritional needs  Description: Monitor and assess patient's nutrition/hydration status for malnutrition   Collaborate with interdisciplinary team and initiate plan and interventions as ordered  Monitor patient's weight and dietary intake as ordered or per policy  Utilize nutrition screening tool and intervene as necessary  Determine patient's food preferences and provide high-protein, high-caloric foods as appropriate  INTERVENTIONS:  - Monitor oral intake, urinary output, labs, and treatment plans  - Assess nutrition and hydration status and recommend course of action  - Evaluate amount of meals eaten  - Assist patient with eating if necessary   - Allow adequate time for meals  - Recommend/ encourage appropriate diets, oral nutritional supplements, and vitamin/mineral supplements  - Order, calculate, and assess calorie counts as needed  - Recommend, monitor, and adjust tube feedings and TPN/PPN based on assessed needs  - Assess need for intravenous fluids  - Provide specific nutrition/hydration education as appropriate  - Include patient/family/caregiver in decisions related to nutrition  Outcome: Progressing     Problem: NEUROSENSORY - ADULT  Goal: Achieves maximal functionality and self care  Description: INTERVENTIONS  - Monitor swallowing and airway patency with patient fatigue and changes in neurological status  - Encourage and assist patient to increase activity and self care     - Encourage visually impaired, hearing impaired and aphasic patients to use assistive/communication devices  Outcome: Progressing  Goal: Achieves stable or improved neurological status  Description: INTERVENTIONS  - Monitor and report changes in neurological status  - Monitor vital signs such as temperature, blood pressure, glucose, and any other labs ordered   - Initiate measures to prevent increased intracranial pressure  - Monitor for seizure activity and implement precautions if appropriate      Outcome: Progressing     Problem: CARDIOVASCULAR - ADULT  Goal: Maintains optimal cardiac output and hemodynamic stability  Description: INTERVENTIONS:  - Monitor I/O, vital signs and rhythm  - Monitor for S/S and trends of decreased cardiac output  - Administer and titrate ordered vasoactive medications to optimize hemodynamic stability  - Assess quality of pulses, skin color and temperature  - Assess for signs of decreased coronary artery perfusion  - Instruct patient to report change in severity of symptoms  Outcome: Progressing     Problem: RESPIRATORY - ADULT  Goal: Achieves optimal ventilation and oxygenation  Description: INTERVENTIONS:  - Assess for changes in respiratory status  - Assess for changes in mentation and behavior  - Position to facilitate oxygenation and minimize respiratory effort  - Oxygen administered by appropriate delivery if ordered  - Initiate smoking cessation education as indicated  - Encourage broncho-pulmonary hygiene including cough, deep breathe, Incentive Spirometry  - Assess the need for suctioning and aspirate as needed  - Assess and instruct to report SOB or any respiratory difficulty  - Respiratory Therapy support as indicated  Outcome: Progressing     Problem: GASTROINTESTINAL - ADULT  Goal: Maintains adequate nutritional intake  Description: INTERVENTIONS:  - Monitor percentage of each meal consumed  - Identify factors contributing to decreased intake, treat as appropriate  - Assist with meals as needed  - Monitor I&O, weight, and lab values if indicated  - Obtain nutrition services referral as needed  Outcome: Progressing     Problem: METABOLIC, FLUID AND ELECTROLYTES - ADULT  Goal: Electrolytes maintained within normal limits  Description: INTERVENTIONS:  - Monitor labs and assess patient for signs and symptoms of electrolyte imbalances  - Administer electrolyte replacement as ordered  - Monitor response to electrolyte replacements, including repeat lab results as appropriate  - Instruct patient on fluid and nutrition as appropriate  Outcome: Progressing  Goal: Fluid balance maintained  Description: INTERVENTIONS:  - Monitor labs   - Monitor I/O and WT  - Instruct patient on fluid and nutrition as appropriate  - Assess for signs & symptoms of volume excess or deficit  Outcome: Progressing     Problem: SKIN/TISSUE INTEGRITY - ADULT  Goal: Skin Integrity remains intact(Skin Breakdown Prevention)  Description: Assess:  -Perform Erickson assessment every shift  -Clean and moisturize skin every day  -Inspect skin when repositioning, toileting, and assisting with ADLS  -Assess extremities for adequate circulation and sensation     Bed Management:  -Have minimal linens on bed & keep smooth, unwrinkled  -Change linens as needed when moist or perspiring  -Avoid sitting or lying in one position for more than 2 hours while in bed  -Keep HOB at 45 degrees     Toileting:  -Offer bedside commode  -Assess for incontinence every 2 hours  -Use incontinent care products after each incontinent episode such as brief    Activity:  -Mobilize patient 3-4 times a day  -Encourage activity and walks on unit  -Encourage or provide ROM exercises   -Turn and reposition patient every 2 Hours  -Use appropriate equipment to lift or move patient in bed  -Instruct/ Assist with weight shifting every 2 when out of bed in chair  -Consider limitation of chair time 2 hour intervals    Skin Care:  -Avoid use of baby powder, tape, friction and shearing, hot water or constrictive clothing  -Relieve pressure over bony prominences using pillows  -Do not massage red bony areas    Next Steps:  -Teach patient strategies to minimize risks such as walker  -Consider consults to  interdisciplinary teams such as wound  Outcome: Progressing  Goal: Incision(s), wounds(s) or drain site(s) healing without S/S of infection  Description: INTERVENTIONS  - Assess and document dressing, incision, wound bed, drain sites and surrounding tissue  - Provide patient and family education  - Perform skin care/dressing changes every PRN  Outcome: Progressing     Problem: MUSCULOSKELETAL - ADULT  Goal: Maintain or return mobility to safest level of function  Description: INTERVENTIONS:  - Assess patient's ability to carry out ADLs; assess patient's baseline for ADL function and identify physical deficits which impact ability to perform ADLs (bathing, care of mouth/teeth, toileting, grooming, dressing, etc )  - Assess/evaluate cause of self-care deficits   - Assess range of motion  - Assess patient's mobility  - Assess patient's need for assistive devices and provide as appropriate  - Encourage maximum independence but intervene and supervise when necessary  - Involve family in performance of ADLs  - Assess for home care needs following discharge   - Consider OT consult to assist with ADL evaluation and planning for discharge  - Provide patient education as appropriate  Outcome: Progressing

## 2022-10-23 NOTE — ASSESSMENT & PLAN NOTE
Due to multiple metabolic derangements, home medications morphine and gabapentin in the setting of acute kidney injury  She is improving, but still has a lot of mental confusion  MRI does show some likely brain injury  She is on IV abx for bacteremia  Plan for [facility] rehab on 10/25 after the antibiotic course is complete

## 2022-10-24 LAB
ANION GAP SERPL CALCULATED.3IONS-SCNC: 10 MMOL/L (ref 4–13)
BASOPHILS # BLD AUTO: 0.09 THOUSANDS/ÂΜL (ref 0–0.1)
BASOPHILS NFR BLD AUTO: 1 % (ref 0–1)
BUN SERPL-MCNC: 15 MG/DL (ref 5–25)
CALCIUM SERPL-MCNC: 9.5 MG/DL (ref 8.3–10.1)
CHLORIDE SERPL-SCNC: 100 MMOL/L (ref 96–108)
CO2 SERPL-SCNC: 25 MMOL/L (ref 21–32)
CREAT SERPL-MCNC: 0.94 MG/DL (ref 0.6–1.3)
EOSINOPHIL # BLD AUTO: 0.14 THOUSAND/ÂΜL (ref 0–0.61)
EOSINOPHIL NFR BLD AUTO: 2 % (ref 0–6)
ERYTHROCYTE [DISTWIDTH] IN BLOOD BY AUTOMATED COUNT: 13.6 % (ref 11.6–15.1)
GFR SERPL CREATININE-BSD FRML MDRD: 68 ML/MIN/1.73SQ M
GLUCOSE SERPL-MCNC: 107 MG/DL (ref 65–140)
HCT VFR BLD AUTO: 37.8 % (ref 34.8–46.1)
HGB BLD-MCNC: 11.7 G/DL (ref 11.5–15.4)
IMM GRANULOCYTES # BLD AUTO: 0.03 THOUSAND/UL (ref 0–0.2)
IMM GRANULOCYTES NFR BLD AUTO: 0 % (ref 0–2)
LYMPHOCYTES # BLD AUTO: 2.31 THOUSANDS/ÂΜL (ref 0.6–4.47)
LYMPHOCYTES NFR BLD AUTO: 26 % (ref 14–44)
MAGNESIUM SERPL-MCNC: 1.5 MG/DL (ref 1.6–2.6)
MCH RBC QN AUTO: 31.8 PG (ref 26.8–34.3)
MCHC RBC AUTO-ENTMCNC: 31 G/DL (ref 31.4–37.4)
MCV RBC AUTO: 103 FL (ref 82–98)
MONOCYTES # BLD AUTO: 0.43 THOUSAND/ÂΜL (ref 0.17–1.22)
MONOCYTES NFR BLD AUTO: 5 % (ref 4–12)
NEUTROPHILS # BLD AUTO: 5.87 THOUSANDS/ÂΜL (ref 1.85–7.62)
NEUTS SEG NFR BLD AUTO: 66 % (ref 43–75)
NRBC BLD AUTO-RTO: 0 /100 WBCS
PLATELET # BLD AUTO: 310 THOUSANDS/UL (ref 149–390)
PMV BLD AUTO: 11.7 FL (ref 8.9–12.7)
POTASSIUM SERPL-SCNC: 4.1 MMOL/L (ref 3.5–5.3)
RBC # BLD AUTO: 3.68 MILLION/UL (ref 3.81–5.12)
SODIUM SERPL-SCNC: 135 MMOL/L (ref 135–147)
WBC # BLD AUTO: 8.87 THOUSAND/UL (ref 4.31–10.16)

## 2022-10-24 RX ORDER — MAGNESIUM SULFATE HEPTAHYDRATE 40 MG/ML
2 INJECTION, SOLUTION INTRAVENOUS ONCE
Status: COMPLETED | OUTPATIENT
Start: 2022-10-24 | End: 2022-10-24

## 2022-10-24 RX ADMIN — MICONAZOLE NITRATE: 20 CREAM TOPICAL at 17:13

## 2022-10-24 RX ADMIN — Medication 250 MG: at 17:13

## 2022-10-24 RX ADMIN — AMLODIPINE BESYLATE 10 MG: 10 TABLET ORAL at 08:49

## 2022-10-24 RX ADMIN — CEFAZOLIN SODIUM 2000 MG: 2 SOLUTION INTRAVENOUS at 21:16

## 2022-10-24 RX ADMIN — HEPARIN SODIUM 5000 UNITS: 5000 INJECTION INTRAVENOUS; SUBCUTANEOUS at 13:42

## 2022-10-24 RX ADMIN — VENLAFAXINE HYDROCHLORIDE 187.5 MG: 37.5 CAPSULE, EXTENDED RELEASE ORAL at 08:49

## 2022-10-24 RX ADMIN — GABAPENTIN 300 MG: 300 CAPSULE ORAL at 08:49

## 2022-10-24 RX ADMIN — Medication 250 MG: at 08:49

## 2022-10-24 RX ADMIN — Medication 1 PATCH: at 08:49

## 2022-10-24 RX ADMIN — CEFAZOLIN SODIUM 2000 MG: 2 SOLUTION INTRAVENOUS at 05:53

## 2022-10-24 RX ADMIN — MICONAZOLE NITRATE: 20 CREAM TOPICAL at 08:49

## 2022-10-24 RX ADMIN — GABAPENTIN 300 MG: 300 CAPSULE ORAL at 21:16

## 2022-10-24 RX ADMIN — HEPARIN SODIUM 5000 UNITS: 5000 INJECTION INTRAVENOUS; SUBCUTANEOUS at 21:16

## 2022-10-24 RX ADMIN — QUETIAPINE FUMARATE 50 MG: 25 TABLET ORAL at 17:13

## 2022-10-24 RX ADMIN — METOPROLOL TARTRATE 25 MG: 25 TABLET, FILM COATED ORAL at 21:16

## 2022-10-24 RX ADMIN — GABAPENTIN 300 MG: 300 CAPSULE ORAL at 17:13

## 2022-10-24 RX ADMIN — QUETIAPINE FUMARATE 50 MG: 25 TABLET ORAL at 08:49

## 2022-10-24 RX ADMIN — METOPROLOL TARTRATE 25 MG: 25 TABLET, FILM COATED ORAL at 08:49

## 2022-10-24 RX ADMIN — HEPARIN SODIUM 5000 UNITS: 5000 INJECTION INTRAVENOUS; SUBCUTANEOUS at 05:53

## 2022-10-24 RX ADMIN — CEFAZOLIN SODIUM 2000 MG: 2 SOLUTION INTRAVENOUS at 13:42

## 2022-10-24 RX ADMIN — MAGNESIUM SULFATE HEPTAHYDRATE 2 G: 40 INJECTION, SOLUTION INTRAVENOUS at 09:40

## 2022-10-24 NOTE — ASSESSMENT & PLAN NOTE
Due to multiple metabolic derangements, home medications morphine and gabapentin in the setting of acute kidney injury  Overall from admission she has improved but is still confused  Patient has been evaluated by Neurology, Behavioral Health during prolonged hospital stay  MRI performed 10/14/2022 with development of severe periventricular and white matter hyperintensities without mass effect enhancement or hemorrhage correlation with toxic encephalopathy  Recommendation for 3 month follow-up to ensure resolution    Pending transferred inpatient facility when antibiotic course is completed 10/25

## 2022-10-24 NOTE — PLAN OF CARE
Problem: PAIN - ADULT  Goal: Verbalizes/displays adequate comfort level or baseline comfort level  Description: Interventions:  - Encourage patient to monitor pain and request assistance  - Assess pain using appropriate pain scale  - Administer analgesics based on type and severity of pain and evaluate response  - Implement non-pharmacological measures as appropriate and evaluate response  - Consider cultural and social influences on pain and pain management  - Notify physician/advanced practitioner if interventions unsuccessful or patient reports new pain  Outcome: Progressing     Problem: INFECTION - ADULT  Goal: Absence or prevention of progression during hospitalization  Description: INTERVENTIONS:  - Assess and monitor for signs and symptoms of infection  - Monitor lab/diagnostic results  - Monitor all insertion sites, i e  indwelling lines, tubes, and drains  - Monitor endotracheal if appropriate and nasal secretions for changes in amount and color  - [place] appropriate cooling/warming therapies per order  - Administer medications as ordered  - Instruct and encourage patient and family to use good hand hygiene technique  - Identify and instruct in appropriate isolation precautions for identified infection/condition  Outcome: Progressing     Problem: SAFETY ADULT  Goal: Patient will remain free of falls  Description: INTERVENTIONS:  - Educate patient/family on patient safety including physical limitations  - Instruct patient to call for assistance with activity   - Consult OT/PT to assist with strengthening/mobility   - Keep Call bell within reach  - Keep bed low and locked with side rails adjusted as appropriate  - Keep care items and personal belongings within reach  - Initiate and maintain comfort rounds  - Make Fall Risk Sign visible to staff  - Offer Toileting every 2 Hours, in advance of need  - Initiate/Maintain bed alarm  - Obtain necessary fall risk management equipment: walker  - Apply yellow socks and bracelet for high fall risk patients  - Consider moving patient to room near nurses station  Outcome: Progressing  Goal: Maintain or return to baseline ADL function  Description: INTERVENTIONS:  -  Assess patient's ability to carry out ADLs; assess patient's baseline for ADL function and identify physical deficits which impact ability to perform ADLs (bathing, care of mouth/teeth, toileting, grooming, dressing, etc )  - Assess/evaluate cause of self-care deficits   - Assess range of motion  - Assess patient's mobility; develop plan if impaired  - Assess patient's need for assistive devices and provide as appropriate  - Encourage maximum independence but intervene and supervise when necessary  - Involve family in performance of ADLs  - Assess for home care needs following discharge   - Consider OT consult to assist with ADL evaluation and planning for discharge  - Provide patient education as appropriate  Outcome: Progressing  Goal: Maintains/Returns to pre admission functional level  Description: INTERVENTIONS:  - Perform BMAT or MOVE assessment daily    - Set and communicate daily mobility goal to care team and patient/family/caregiver  - Collaborate with rehabilitation services on mobility goals if consulted  - Perform Range of Motion 4-6 times a day  - Reposition patient every 2 hours    - Dangle patient 3-4 times a day  - Stand patient 3 times a day  - Ambulate patient 3-4 times a day  - Out of bed for toileting  - Record patient progress and toleration of activity level   Outcome: Progressing     Problem: DISCHARGE PLANNING  Goal: Discharge to home or other facility with appropriate resources  Description: INTERVENTIONS:  - Identify barriers to discharge w/patient and caregiver  - Arrange for needed discharge resources and transportation as appropriate  - Identify discharge learning needs (meds, wound care, etc )  - Arrange for interpretive services to assist at discharge as needed  - Refer to Case Management Department for coordinating discharge planning if the patient needs post-hospital services based on physician/advanced practitioner order or complex needs related to functional status, cognitive ability, or social support system  Outcome: Progressing     Problem: Knowledge Deficit  Goal: Patient/family/caregiver demonstrates understanding of disease process, treatment plan, medications, and discharge instructions  Description: Complete learning assessment and assess knowledge base    Interventions:  - Provide teaching at level of understanding  - Provide teaching via preferred learning methods  Outcome: Progressing     Problem: Potential for Falls  Goal: Patient will remain free of falls  Description: INTERVENTIONS:  - Educate patient/family on patient safety including physical limitations  - Instruct patient to call for assistance with activity   - Consult OT/PT to assist with strengthening/mobility   - Keep Call bell within reach  - Keep bed low and locked with side rails adjusted as appropriate  - Keep care items and personal belongings within reach  - Initiate and maintain comfort rounds  - Make Fall Risk Sign visible to staff  - Offer Toileting every 2 Hours, in advance of need  - Initiate/Maintain bed alarm  - Obtain necessary fall risk management equipment: walker  - Apply yellow socks and bracelet for high fall risk patients  - Consider moving patient to room near nurses station  Outcome: Progressing     Problem: MOBILITY - ADULT  Goal: Maintain or return to baseline ADL function  Description: INTERVENTIONS:  -  Assess patient's ability to carry out ADLs; assess patient's baseline for ADL function and identify physical deficits which impact ability to perform ADLs (bathing, care of mouth/teeth, toileting, grooming, dressing, etc )  - Assess/evaluate cause of self-care deficits   - Assess range of motion  - Assess patient's mobility; develop plan if impaired  - Assess patient's need for assistive devices and provide as appropriate  - Encourage maximum independence but intervene and supervise when necessary  - Involve family in performance of ADLs  - Assess for home care needs following discharge   - Consider OT consult to assist with ADL evaluation and planning for discharge  - Provide patient education as appropriate  Outcome: Progressing  Goal: Maintains/Returns to pre admission functional level  Description: INTERVENTIONS:  - Perform BMAT or MOVE assessment daily    - Set and communicate daily mobility goal to care team and patient/family/caregiver  - Collaborate with rehabilitation services on mobility goals if consulted  - Perform Range of Motion 4-6 times a day  - Reposition patient every 2 hours  - Dangle patient 3-4 times a day  - Stand patient 3 times a day  - Ambulate patient 3-4 times a day  - Out of bed for toileting  - Record patient progress and toleration of activity level   Outcome: Progressing     Problem: Prexisting or High Potential for Compromised Skin Integrity  Goal: Skin integrity is maintained or improved  Description: INTERVENTIONS:  - Identify patients at risk for skin breakdown  - Assess and monitor skin integrity  - Assess and monitor nutrition and hydration status  - Monitor labs   - Assess for incontinence   - Turn and reposition patient  - Assist with mobility/ambulation  - Relieve pressure over bony prominences  - Avoid friction and shearing  - Provide appropriate hygiene as needed including keeping skin clean and dry  - Evaluate need for skin moisturizer/barrier cream  - Collaborate with interdisciplinary team   - Patient/family teaching  - Consider wound care consult   Outcome: Progressing     Problem: Nutrition/Hydration-ADULT  Goal: Nutrient/Hydration intake appropriate for improving, restoring or maintaining nutritional needs  Description: Monitor and assess patient's nutrition/hydration status for malnutrition   Collaborate with interdisciplinary team and initiate plan and interventions as ordered  Monitor patient's weight and dietary intake as ordered or per policy  Utilize nutrition screening tool and intervene as necessary  Determine patient's food preferences and provide high-protein, high-caloric foods as appropriate  INTERVENTIONS:  - Monitor oral intake, urinary output, labs, and treatment plans  - Assess nutrition and hydration status and recommend course of action  - Evaluate amount of meals eaten  - Assist patient with eating if necessary   - Allow adequate time for meals  - Recommend/ encourage appropriate diets, oral nutritional supplements, and vitamin/mineral supplements  - Order, calculate, and assess calorie counts as needed  - Recommend, monitor, and adjust tube feedings and TPN/PPN based on assessed needs  - Assess need for intravenous fluids  - Provide specific nutrition/hydration education as appropriate  - Include patient/family/caregiver in decisions related to nutrition  Outcome: Progressing     Problem: NEUROSENSORY - ADULT  Goal: Achieves maximal functionality and self care  Description: INTERVENTIONS  - Monitor swallowing and airway patency with patient fatigue and changes in neurological status  - Encourage and assist patient to increase activity and self care     - Encourage visually impaired, hearing impaired and aphasic patients to use assistive/communication devices  Outcome: Progressing  Goal: Achieves stable or improved neurological status  Description: INTERVENTIONS  - Monitor and report changes in neurological status  - Monitor vital signs such as temperature, blood pressure, glucose, and any other labs ordered   - Initiate measures to prevent increased intracranial pressure  - Monitor for seizure activity and implement precautions if appropriate      Outcome: Progressing     Problem: CARDIOVASCULAR - ADULT  Goal: Maintains optimal cardiac output and hemodynamic stability  Description: INTERVENTIONS:  - Monitor I/O, vital signs and rhythm  - Monitor for S/S and trends of decreased cardiac output  - Administer and titrate ordered vasoactive medications to optimize hemodynamic stability  - Assess quality of pulses, skin color and temperature  - Assess for signs of decreased coronary artery perfusion  - Instruct patient to report change in severity of symptoms  Outcome: Progressing     Problem: RESPIRATORY - ADULT  Goal: Achieves optimal ventilation and oxygenation  Description: INTERVENTIONS:  - Assess for changes in respiratory status  - Assess for changes in mentation and behavior  - Position to facilitate oxygenation and minimize respiratory effort  - Oxygen administered by appropriate delivery if ordered  - Initiate smoking cessation education as indicated  - Encourage broncho-pulmonary hygiene including cough, deep breathe, Incentive Spirometry  - Assess the need for suctioning and aspirate as needed  - Assess and instruct to report SOB or any respiratory difficulty  - Respiratory Therapy support as indicated  Outcome: Progressing     Problem: GASTROINTESTINAL - ADULT  Goal: Maintains adequate nutritional intake  Description: INTERVENTIONS:  - Monitor percentage of each meal consumed  - Identify factors contributing to decreased intake, treat as appropriate  - Assist with meals as needed  - Monitor I&O, weight, and lab values if indicated  - Obtain nutrition services referral as needed  Outcome: Progressing     Problem: METABOLIC, FLUID AND ELECTROLYTES - ADULT  Goal: Electrolytes maintained within normal limits  Description: INTERVENTIONS:  - Monitor labs and assess patient for signs and symptoms of electrolyte imbalances  - Administer electrolyte replacement as ordered  - Monitor response to electrolyte replacements, including repeat lab results as appropriate  - Instruct patient on fluid and nutrition as appropriate  Outcome: Progressing  Goal: Fluid balance maintained  Description: INTERVENTIONS:  - Monitor labs   - Monitor I/O and WT  - Instruct patient on fluid and nutrition as appropriate  - Assess for signs & symptoms of volume excess or deficit  Outcome: Progressing     Problem: SKIN/TISSUE INTEGRITY - ADULT  Goal: Skin Integrity remains intact(Skin Breakdown Prevention)  Description: Assess:  -Perform [place] assessment every shift  -Clean and moisturize skin every day  -Inspect skin when repositioning, toileting, and assisting with ADLS  -Assess extremities for adequate circulation and sensation     Bed Management:  -Have minimal linens on bed & keep smooth, unwrinkled  -Change linens as needed when moist or perspiring  -Avoid sitting or lying in one position for more than 2 hours while in bed  -Keep HOB at 45 degrees     Toileting:  -Offer bedside commode  -Assess for incontinence every 2 hours  -Use incontinent care products after each incontinent episode such as brief    Activity:  -Mobilize patient 3-4 times a day  -Encourage activity and walks on unit  -Encourage or provide ROM exercises   -Turn and reposition patient every 2 Hours  -Use appropriate equipment to lift or move patient in bed  -Instruct/ Assist with weight shifting every 2 when out of bed in chair  -Consider limitation of chair time 2 hour intervals    Skin Care:  -Avoid use of baby powder, tape, friction and shearing, hot water or constrictive clothing  -Relieve pressure over bony prominences using pillows  -Do not massage red bony areas    Next Steps:  -Teach patient strategies to minimize risks such as walker  -Consider consults to  interdisciplinary teams such as wound  Outcome: Progressing  Goal: Incision(s), wounds(s) or drain site(s) healing without S/S of infection  Description: INTERVENTIONS  - Assess and document dressing, incision, wound bed, drain sites and surrounding tissue  - Provide patient and family education  - Perform skin care/dressing changes every PRN  Outcome: Progressing     Problem: MUSCULOSKELETAL - ADULT  Goal: Maintain or return mobility to safest level of function  Description: INTERVENTIONS:  - Assess patient's ability to carry out ADLs; assess patient's baseline for ADL function and identify physical deficits which impact ability to perform ADLs (bathing, care of mouth/teeth, toileting, grooming, dressing, etc )  - Assess/evaluate cause of self-care deficits   - Assess range of motion  - Assess patient's mobility  - Assess patient's need for assistive devices and provide as appropriate  - Encourage maximum independence but intervene and supervise when necessary  - Involve family in performance of ADLs  - Assess for home care needs following discharge   - Consider OT consult to assist with ADL evaluation and planning for discharge  - Provide patient education as appropriate  Outcome: Progressing

## 2022-10-24 NOTE — PLAN OF CARE
Problem: PAIN - ADULT  Goal: Verbalizes/displays adequate comfort level or baseline comfort level  Description: Interventions:  - Encourage patient to monitor pain and request assistance  - Assess pain using appropriate pain scale  - Administer analgesics based on type and severity of pain and evaluate response  - Implement non-pharmacological measures as appropriate and evaluate response  - Consider cultural and social influences on pain and pain management  - Notify physician/advanced practitioner if interventions unsuccessful or patient reports new pain  Outcome: Progressing     Problem: INFECTION - ADULT  Goal: Absence or prevention of progression during hospitalization  Description: INTERVENTIONS:  - Assess and monitor for signs and symptoms of infection  - Monitor lab/diagnostic results  - Monitor all insertion sites, i e  indwelling lines, tubes, and drains  - Monitor endotracheal if appropriate and nasal secretions for changes in amount and color  - [place] appropriate cooling/warming therapies per order  - Administer medications as ordered  - Instruct and encourage patient and family to use good hand hygiene technique  - Identify and instruct in appropriate isolation precautions for identified infection/condition  Outcome: Progressing     Problem: SAFETY ADULT  Goal: Patient will remain free of falls  Description: INTERVENTIONS:  - Educate patient/family on patient safety including physical limitations  - Instruct patient to call for assistance with activity   - Consult OT/PT to assist with strengthening/mobility   - Keep Call bell within reach  - Keep bed low and locked with side rails adjusted as appropriate  - Keep care items and personal belongings within reach  - Initiate and maintain comfort rounds  - Make Fall Risk Sign visible to staff  - Offer Toileting every 2 Hours, in advance of need  - Initiate/Maintain bed alarm  - Obtain necessary fall risk management equipment: walker  - Apply yellow socks and bracelet for high fall risk patients  - Consider moving patient to room near nurses station  Outcome: Progressing  Goal: Maintain or return to baseline ADL function  Description: INTERVENTIONS:  -  Assess patient's ability to carry out ADLs; assess patient's baseline for ADL function and identify physical deficits which impact ability to perform ADLs (bathing, care of mouth/teeth, toileting, grooming, dressing, etc )  - Assess/evaluate cause of self-care deficits   - Assess range of motion  - Assess patient's mobility; develop plan if impaired  - Assess patient's need for assistive devices and provide as appropriate  - Encourage maximum independence but intervene and supervise when necessary  - Involve family in performance of ADLs  - Assess for home care needs following discharge   - Consider OT consult to assist with ADL evaluation and planning for discharge  - Provide patient education as appropriate  Outcome: Progressing  Goal: Maintains/Returns to pre admission functional level  Description: INTERVENTIONS:  - Perform BMAT or MOVE assessment daily    - Set and communicate daily mobility goal to care team and patient/family/caregiver  - Collaborate with rehabilitation services on mobility goals if consulted  - Perform Range of Motion 4-6 times a day  - Reposition patient every 2 hours    - Dangle patient 3-4 times a day  - Stand patient 3 times a day  - Ambulate patient 3-4 times a day  - Out of bed for toileting  - Record patient progress and toleration of activity level   Outcome: Progressing     Problem: DISCHARGE PLANNING  Goal: Discharge to home or other facility with appropriate resources  Description: INTERVENTIONS:  - Identify barriers to discharge w/patient and caregiver  - Arrange for needed discharge resources and transportation as appropriate  - Identify discharge learning needs (meds, wound care, etc )  - Arrange for interpretive services to assist at discharge as needed  - Refer to Case Management Department for coordinating discharge planning if the patient needs post-hospital services based on physician/advanced practitioner order or complex needs related to functional status, cognitive ability, or social support system  Outcome: Progressing     Problem: MOBILITY - ADULT  Goal: Maintain or return to baseline ADL function  Description: INTERVENTIONS:  -  Assess patient's ability to carry out ADLs; assess patient's baseline for ADL function and identify physical deficits which impact ability to perform ADLs (bathing, care of mouth/teeth, toileting, grooming, dressing, etc )  - Assess/evaluate cause of self-care deficits   - Assess range of motion  - Assess patient's mobility; develop plan if impaired  - Assess patient's need for assistive devices and provide as appropriate  - Encourage maximum independence but intervene and supervise when necessary  - Involve family in performance of ADLs  - Assess for home care needs following discharge   - Consider OT consult to assist with ADL evaluation and planning for discharge  - Provide patient education as appropriate  Outcome: Progressing  Goal: Maintains/Returns to pre admission functional level  Description: INTERVENTIONS:  - Perform BMAT or MOVE assessment daily    - Set and communicate daily mobility goal to care team and patient/family/caregiver  - Collaborate with rehabilitation services on mobility goals if consulted  - Perform Range of Motion 4-6 times a day  - Reposition patient every 2 hours    - Dangle patient 3-4 times a day  - Stand patient 3 times a day  - Ambulate patient 3-4 times a day  - Out of bed for toileting  - Record patient progress and toleration of activity level   Outcome: Progressing     Problem: Prexisting or High Potential for Compromised Skin Integrity  Goal: Skin integrity is maintained or improved  Description: INTERVENTIONS:  - Identify patients at risk for skin breakdown  - Assess and monitor skin integrity  - Assess and monitor nutrition and hydration status  - Monitor labs   - Assess for incontinence   - Turn and reposition patient  - Assist with mobility/ambulation  - Relieve pressure over bony prominences  - Avoid friction and shearing  - Provide appropriate hygiene as needed including keeping skin clean and dry  - Evaluate need for skin moisturizer/barrier cream  - Collaborate with interdisciplinary team   - Patient/family teaching  - Consider wound care consult   Outcome: Progressing     Problem: Nutrition/Hydration-ADULT  Goal: Nutrient/Hydration intake appropriate for improving, restoring or maintaining nutritional needs  Description: Monitor and assess patient's nutrition/hydration status for malnutrition  Collaborate with interdisciplinary team and initiate plan and interventions as ordered  Monitor patient's weight and dietary intake as ordered or per policy  Utilize nutrition screening tool and intervene as necessary  Determine patient's food preferences and provide high-protein, high-caloric foods as appropriate       INTERVENTIONS:  - Monitor oral intake, urinary output, labs, and treatment plans  - Assess nutrition and hydration status and recommend course of action  - Evaluate amount of meals eaten  - Assist patient with eating if necessary   - Allow adequate time for meals  - Recommend/ encourage appropriate diets, oral nutritional supplements, and vitamin/mineral supplements  - Order, calculate, and assess calorie counts as needed  - Recommend, monitor, and adjust tube feedings and TPN/PPN based on assessed needs  - Assess need for intravenous fluids  - Provide specific nutrition/hydration education as appropriate  - Include patient/family/caregiver in decisions related to nutrition  Outcome: Progressing     Problem: NEUROSENSORY - ADULT  Goal: Achieves maximal functionality and self care  Description: INTERVENTIONS  - Monitor swallowing and airway patency with patient fatigue and changes in neurological status  - Encourage and assist patient to increase activity and self care     - Encourage visually impaired, hearing impaired and aphasic patients to use assistive/communication devices  Outcome: Progressing  Goal: Achieves stable or improved neurological status  Description: INTERVENTIONS  - Monitor and report changes in neurological status  - Monitor vital signs such as temperature, blood pressure, glucose, and any other labs ordered   - Initiate measures to prevent increased intracranial pressure  - Monitor for seizure activity and implement precautions if appropriate      Outcome: Progressing

## 2022-10-24 NOTE — PLAN OF CARE
Problem: OCCUPATIONAL THERAPY ADULT  Goal: Performs self-care activities at highest level of function for planned discharge setting  See evaluation for individualized goals  Description: Treatment Interventions: ADL retraining, Functional transfer training, Endurance training, Cognitive reorientation, Patient/family training, Equipment evaluation/education, Activityengagement          See flowsheet documentation for full assessment, interventions and recommendations  Outcome: Progressing  Note: Limitation: Decreased ADL status, Decreased Safe judgement during ADL, Decreased cognition, Decreased endurance, Decreased high-level ADLs     Assessment: Pt seen for OT tx session w/ focus on bed mobility, self care, transfers, functional mobility, and activity tolerance  C/o B foot pain when ambulating  Oriented to self only  Difficulties following directions  Improvement noted in self care tasks but requires increased assist for steadying during standing portions  Cues needed for sequencing and safety  Unsteady on feet  Cues to use walker properly and keep walker on the ground  Minimal verbalizations  Remains below baseline despite improvements  Continue to recommend STR to achieve optimal performance and decrease caregiver burden       OT Discharge Recommendation: Post acute rehabilitation services

## 2022-10-24 NOTE — PLAN OF CARE
Problem: PHYSICAL THERAPY ADULT  Goal: Performs mobility at highest level of function for planned discharge setting  See evaluation for individualized goals  Description: Treatment/Interventions: Functional transfer training, LE strengthening/ROM, Elevations, Therapeutic exercise, Cognitive reorientation, Patient/family training, Equipment eval/education, Bed mobility, Gait training, Compensatory technique education, Continued evaluation, Spoke to nursing, Spoke to case management, Spoke to MD, OT, ST  Equipment Recommended: Juancarlos Casper (if patient does not own)       See flowsheet documentation for full assessment, interventions and recommendations  Outcome: Progressing  Note: Prognosis: Guarded  Problem List: Decreased strength, Decreased endurance, Impaired balance, Decreased mobility, Decreased coordination, Decreased cognition, Impaired judgement, Decreased safety awareness, Pain  Assessment: t  Was seen for PT treatment session as per POC  Pt  Rec;d supine in bed  Pt  performs supine to sit and iut to supine with supervision with increased time  Pt is requiring min assist x1 for transfers to and from all surfaces and surface heights  Pt  requres cues for hand placement and use of grab bar with toilet transfers  Pt ambulales with use of Rw,  Cues to maintain  of walker at all times, cues for proper and safe use of rw and cues for improved quality of gait  Gait deviations as noted in flow sheet cues and assistance required for steering and walker management  Pt  continues to demontrate unseady gait with retropulsion and posteior lob when taking steps backward  Pt  requires step by step cues for sequencing of all tasks for performance and completion of all tasks  Pt  Is requiring min assist x1 for ambulation with standby assist of another for safety and and difficulty with following commands  Pt  Remains functioning well below baseline level of mobility, increased risk for falls     Would continue to benefit from therapy services to facilitate recovery and reduce fall risk  Recommend inpt rehab at d/c with questionable need for placement due to current cognitive deficits and questionable recovery and cocnern for family's ability to care for pt at current LOF  Barriers to Discharge: Inaccessible home environment, Decreased caregiver support  Barriers to Discharge Comments: unable to identify at this time  PT Discharge Recommendation: Post acute rehabilitation services    See flowsheet documentation for full assessment

## 2022-10-24 NOTE — ASSESSMENT & PLAN NOTE
MSSA bacteremia, repeat cultures neg for greater than 72 hours  Echo with no vegetation  Id following  On IV ancef through 10/25

## 2022-10-24 NOTE — PROGRESS NOTES
[facility] 46  Progress Note - Belkis Harrell 1970, 45 y o  female MRN: [id number]  Unit/Bed#: [facility] -01 Encounter: [id number]  Primary Care Provider: Ni Yanes MD   Date and time admitted to hospital: 9/19/2022  8:04 AM    * Toxic metabolic encephalopathy  Assessment & Plan  Due to multiple metabolic derangements, home medications morphine and gabapentin in the setting of acute kidney injury  Overall from admission she has improved but is still confused  Patient has been evaluated by Neurology, Gerhard Leonard during prolonged hospital stay  MRI performed 10/14/2022 with development of severe periventricular and white matter hyperintensities without mass effect enhancement or hemorrhage correlation with toxic encephalopathy  Recommendation for 3 month follow-up to ensure resolution  Pending transferred inpatient facility when antibiotic course is completed 10/25          Bacteremia due to methicillin susceptible Staphylococcus aureus (MSSA)  Assessment & Plan  MSSA bacteremia, repeat cultures neg for greater than 72 hours  Echo with no vegetation  Id following  On IV ancef through 10/25    ARF (acute renal failure) ([address] )  Assessment & Plan  · ANUJA/ATN secondary to rhabdomyolysis  No evidence of renal obstruction on imaging    Kidney function has returned to normal      Depression  Assessment & Plan  · Does have history of suicide attempt a currently does not show any signs of thoughts of self-harm  · Psychiatry consultation appreciated  · Continue current medications        VTE Pharmacologic Prophylaxis:  Heparin    Patient Centered Rounds:  Patient care rounds were performed with nursing    Time Spent for Care: 30  More than 50% of total time spent on counseling and coordination of care as described above      Current Length of Stay: 35 day(s)    Current Patient Status: Inpatient   Certification Statement: The patient will continue to require additional inpatient hospital stay due to awaiting placement    Discharge Plan:  Awaiting placement    Code Status: Level 1 - Full Code      Subjective:   Patient seen evaluated  No overnight events  No complaints  Objective:     Vitals:   Temp (24hrs), Av 2 °F (36 8 °C), Min:97 9 °F (36 6 °C), Max:98 5 °F (36 9 °C)    Temp:  [97 9 °F (36 6 °C)-98 5 °F (36 9 °C)] 97 9 °F (36 6 °C)  HR:  [103-107] 103  Resp:  [18] 18  BP: (128-131)/(83-84) 128/83  SpO2:  [96 %-97 %] 97 %  Body mass index is 18 4 kg/m²  Input and Output Summary (last 24 hours): Intake/Output Summary (Last 24 hours) at 10/24/2022 1336  Last data filed at 10/23/2022 1848  Gross per 24 hour   Intake --   Output 150 ml   Net -150 ml       Physical Exam:     Physical Exam  Vitals reviewed  Constitutional:       General: She is not in acute distress  Appearance: She is well-developed  She is not ill-appearing, toxic-appearing or diaphoretic  HENT:      Head: Normocephalic and atraumatic  Mouth/Throat:      Mouth: Mucous membranes are moist    Eyes:      General: No scleral icterus  Extraocular Movements: Extraocular movements intact  Cardiovascular:      Rate and Rhythm: Normal rate and regular rhythm  Heart sounds: Normal heart sounds  Pulmonary:      Effort: Pulmonary effort is normal  No respiratory distress  Breath sounds: Normal breath sounds  No wheezing or rales  Abdominal:      General: There is no distension  Palpations: Abdomen is soft  Tenderness: There is no abdominal tenderness  There is no guarding or rebound  Musculoskeletal:         General: No swelling, tenderness or deformity  Skin:     General: Skin is warm and dry  Neurological:      Mental Status: She is alert  She is disoriented  Additional Data:     Labs:  I have reviewed pertinent results     Results from last 7 days   Lab Units 10/24/22  0530   WBC Thousand/uL 8 87   HEMOGLOBIN g/dL 11 7   HEMATOCRIT % 37 8   PLATELETS Thousands/uL 310   NEUTROS PCT % 66   LYMPHS PCT % 26   MONOS PCT % 5   EOS PCT % 2     Results from last 7 days   Lab Units 10/24/22  0530   SODIUM mmol/L 135   POTASSIUM mmol/L 4 1   CHLORIDE mmol/L 100   CO2 mmol/L 25   BUN mg/dL 15   CREATININE mg/dL 0 94   ANION GAP mmol/L 10   CALCIUM mg/dL 9 5   GLUCOSE RANDOM mg/dL 107                         Imaging: I have reviewed pertinent imaging       Recent Cultures (last 7 days):           Last 24 Hours Medication List:   Current Facility-Administered Medications   Medication Dose Route Frequency Provider Last Rate   • acetaminophen  650 mg Oral Q6H PRN Joon Diehl, DO     • ALPRAZolam  0 5 mg Oral HS PRN Matthew Villar MD     • amLODIPine  10 mg Oral Daily Evon Hough MD     • cefazolin  2,000 mg Intravenous Jamar Woodbury, CRNP 2,000 mg (10/24/22 0553)   • gabapentin  300 mg Oral TID Sandra Zuleta MD     • heparin (porcine)  5,000 Units Subcutaneous [facility] Nedra Adan, DO     • HYDROmorphone  0 5 mg Intravenous Q4H PRN Jono Diehl DO     • levalbuterol  1 25 mg Nebulization Q4H PRN Radha Armstrong DO     • loperamide  2 mg Oral 4x Daily PRN Matthew Villar MD     • metoprolol tartrate  25 mg Oral Q12H [address] Inder Warner MD     • VANDANA ANTIFUNGAL   Topical BID Adam Monahan DO     • nicotine  1 patch Transdermal Daily Atul Adan DO     • OLANZapine  5 mg Oral Q6H PRN Radha Armstrong DO     • ondansetron  4 mg Intravenous Q4H PRN Jono Diehl DO     • QUEtiapine  50 mg Oral BID Matthew Villar MD     • saccharomyces boulardii  250 mg Oral BID Matthew Villar MD     • venlafaxine  187 5 mg Oral Daily Zulay Arriaga DO          Today, Patient Was Seen By: Meridith Babinski    ** Please Note: Dictation voice to text software may have been used in the creation of this document   **

## 2022-10-24 NOTE — UTILIZATION REVIEW
Continued Stay Review    Date: 10/19/22                      Current Patient Class: [id number] Current Level of Care: MS    HPI:52 y o  female initially admitted on 9*19     Assessment/Plan:   Sepsis evolving but improving  MSSA bacteremia  Negative 2D echo  Negative blood cultures  More awake today with some disorientation and sleepy  No c/o fevers, chills       Vital Signs:   Row Name 10/19/22 16:12:51 10/19/22 13:42:39   Vital Signs   Temp 99 9 °F (37 7 °C)  -DI --   Pulse 109 Abnormal   - Abnormal   -DI   Resp 20  -DI --   /72  -/75  -DI   MAP (mmHg) 82  -DI 87  -DI         Pertinent Labs/Diagnostic Results:       Results from last 7 days   Lab Units 10/24/22  0530 10/19/22  0445 10/18/22  0436   WBC Thousand/uL 8 87 10 01 9 10   HEMOGLOBIN g/dL 11 7 10 7* 10 3*   HEMATOCRIT % 37 8 34 0* 32 5*   PLATELETS Thousands/uL 310 354 357   NEUTROS ABS Thousands/µL 5 87  --  6 08         Results from last 7 days   Lab Units 10/24/22  0530 10/20/22  0539 10/19/22  0445 10/18/22  0436   SODIUM mmol/L 135 139 140 139   POTASSIUM mmol/L 4 1 4 0 3 4* 3 3*   CHLORIDE mmol/L 100 105 104 102   CO2 mmol/L 25 25 27 28   ANION GAP mmol/L 10 9 9 9   BUN mg/dL 15 14 12 12   CREATININE mg/dL 0 94 1 14 1 01 1 02   EGFR ml/min/1 73sq m 68 54 63 62   CALCIUM mg/dL 9 5 10 0 9 4 9 7   MAGNESIUM mg/dL 1 5* 1 6 1 4* 1 4*             Results from last 7 days   Lab Units 10/24/22  0530 10/20/22  0539 10/19/22  0445 10/18/22  0436   GLUCOSE RANDOM mg/dL 107 109 97 91     Medications:   Scheduled Medications:  amLODIPine, 10 mg, Oral, Daily  cefazolin, 2,000 mg, Intravenous, Q8H  gabapentin, 300 mg, Oral, TID  heparin (porcine), 5,000 Units, Subcutaneous, Q8H CHOCO  metoprolol tartrate, 25 mg, Oral, Q12H [address]  VANDANA ANTIFUNGAL, , Topical, BID  nicotine, 1 patch, Transdermal, Daily  QUEtiapine, 50 mg, Oral, BID  saccharomyces boulardii, 250 mg, Oral, BID  venlafaxine, 187 5 mg, Oral, Daily      Continuous IV Infusions:     PRN Meds:  acetaminophen, 650 mg, Oral, Q6H PRN  ALPRAZolam, 0 5 mg, Oral, HS PRN - x  1 10/19  HYDROmorphone, 0 5 mg, Intravenous, Q4H PRN  levalbuterol, 1 25 mg, Nebulization, Q4H PRN  loperamide, 2 mg, Oral, 4x Daily PRN  OLANZapine, 5 mg, Oral, Q6H PRN -x 1 10/19  ondansetron, 4 mg, Intravenous, Q4H PRN      Discharge Plan: D     Network Utilization Review Department  ATTENTION: Please call with any questions or concerns to [phone] and carefully listen to the prompts so that you are directed to the right person  All voicemails are confidential   Finis Feeling all requests for admission clinical reviews, approved or denied determinations and any other requests to dedicated fax number below belonging to the campus where the patient is receiving treatment   List of dedicated fax numbers for the Facilities:  [address] DENIALS (Administrative/Medical Necessity) [phone]   [address] (Maternity/NICU/Pediatrics) [phone]   911 Minda Baldwin [phone]   [facility] Giovanni  [phone]   [address] [place] [address] Wanda [address] [phone]   1553 [facility] Sa[facility]l [facility] 134 815 [facility] [phone]

## 2022-10-24 NOTE — ASSESSMENT & PLAN NOTE
· Does have history of suicide attempt a currently does not show any signs of thoughts of self-harm  · Psychiatry consultation appreciated  · Continue current medications

## 2022-10-24 NOTE — UTILIZATION REVIEW
Continued Stay Review    Date: 10/23/22                     Current Patient Class: IP  Current Level of Care: MS    HPI:52 y o  female initially admitted on 5/40 toxic metabolic encephalopathy    Assessment/Plan:   Showing improvement but still with increased mental confusion, able to have short conversations  MRI shows some brain injury, is on IV antibiotics for bacteremia  Should be completed on 10/25  Used IV Zofran x 1 10/23  Tentative plan for d/c to acute rehab on 10/25        Vital Signs:  10/23/22 21:59:31 98 5 °F (36 9 °C) 107 Abnormal  18 131/84 100 96 % None (Room air) --   10/23/22 12:05:31 -- 103 -- 90/64 73 99 % -- --   10/23/22 07:25:18 98 4 °F (36 9 °C) -- 16 149/115 Abnormal  126 -- None (Room air) Lying   10/22/22 2300 -- 98 -- -- -- 96 % -- --   10/22/22 22:35:01 -- 107 Abnormal  -- 140/93 109 98 % None (Room air) Lying   10/22/22 22:34:35 -- 101 -- 140/93 109 98 % -- --   10/22/22 2234 -- 101 -- 140/93 -- -- -- --   10/22/22 2100 -- 122 Abnormal  -- -- -- 95 % -- --   10/22/22 1900 -- 119 Abnormal  -- -- -- 98 % -- --   10/22/22 1700 -- 115 Abnormal  -- -- -- 98 % -- --   10/22/22 15:07:05 98 3 °F (36 8 °C) 109 Abnormal  20 97/65 76 99 % -- --   10/22/22 1500 -- 109 Abnormal  -- -- -- 99 % -- --     Pertinent Labs/Diagnostic Results:       Results from last 7 days   Lab Units 10/19/22  0445 10/18/22  0436   WBC Thousand/uL 10 01 9 10   HEMOGLOBIN g/dL 10 7* 10 3*   HEMATOCRIT % 34 0* 32 5*   PLATELETS Thousands/uL 354 357   NEUTROS ABS Thousands/µL  --  6 08         Results from last 7 days   Lab Units 10/20/22  0539 10/19/22  0445 10/18/22  0436   SODIUM mmol/L 139 140 139   POTASSIUM mmol/L 4 0 3 4* 3 3*   CHLORIDE mmol/L 105 104 102   CO2 mmol/L 25 27 28   ANION GAP mmol/L 9 9 9   BUN mg/dL 14 12 12   CREATININE mg/dL 1 14 1 01 1 02   EGFR ml/min/1 73sq m 54 63 62   CALCIUM mg/dL 10 0 9 4 9 7   MAGNESIUM mg/dL 1 6 1 4* 1 4*             Results from last 7 days   Lab Units 10/20/22  0539 10/19/22  0445 10/18/22  0436   GLUCOSE RANDOM mg/dL 109 97 91       Medications:   Scheduled Medications:  amLODIPine, 10 mg, Oral, Daily  cefazolin, 2,000 mg, Intravenous, Q8H  gabapentin, 300 mg, Oral, TID  heparin (porcine), 5,000 Units, Subcutaneous, Q8H CHOCO  metoprolol tartrate, 25 mg, Oral, Q12H [facility]  VANDANA ANTIFUNGAL, , Topical, BID  nicotine, 1 patch, Transdermal, Daily  QUEtiapine, 50 mg, Oral, BID  saccharomyces boulardii, 250 mg, Oral, BID  venlafaxine, 187 5 mg, Oral, Daily      Continuous IV Infusions:     PRN Meds:  acetaminophen, 650 mg, Oral, Q6H PRN  ALPRAZolam, 0 5 mg, Oral, HS PRN - x 1 10/23, 10/22  HYDROmorphone, 0 5 mg, Intravenous, Q4H PRN  levalbuterol, 1 25 mg, Nebulization, Q4H PRN  loperamide, 2 mg, Oral, 4x Daily PRN  OLANZapine, 5 mg, Oral, Q6H PRN  ondansetron, 4 mg, Intravenous, Q4H PRN -x 1 10/23    Discharge Plan: acute rehab     Network Utilization Review Department  ATTENTION: Please call with any questions or concerns to [phone] and carefully listen to the prompts so that you are directed to the right person  All voicemails are confidential   Reva Espinoza all requests for admission clinical reviews, approved or denied determinations and any other requests to dedicated fax number below belonging to the campus where the patient is receiving treatment   List of dedicated fax numbers for the Facilities:  [address] [facility] [address] DENIALS (Administrative/Medical Necessity) [phone]   [address]  (Maternity/NICU/Pediatrics) [phone]   [address]inda [address] [phone]   [address] [phone]   [address] [place] [address] [address] [place] [place] [address] [facility] 28 [phone]     1550 [facility] [place] [phone]   [address] [phone]

## 2022-10-24 NOTE — PHYSICAL THERAPY NOTE
PHYSICAL THERAPY NOTE          Patient Name: Jordan MARTIN Date: 10/24/2022    10/24/22 8491   Note Type   Note Type Treatment   Pain Assessment   Pain Assessment Tool 0-10   Pain Score 4   Pain Location/Orientation [place] Foot;Orientation: Bilateral   Hospital Pain Intervention(s) Repositioned; Ambulation/increased activity; Emotional support; Rest   Restrictions/Precautions   Other Precautions Chair Alarm; Bed Alarm;Cognitive;1:1   General   Chart Reviewed Yes   Family/Caregiver Present No   Cognition   Overall Cognitive Status Impaired   Arousal/Participation Responsive   Attention Difficulty attending to directions   Orientation Level Oriented to person   Memory Decreased recall of precautions;Decreased recall of recent events;Decreased short term memory   Following Commands Follows one step commands with increased time or repetition   Subjective   Subjective Pt  in bed upon arrival  Pt needing to go to the bathroom   Bed Mobility   Supine to Sit 5  Supervision   Additional items Assist x 1;HOB elevated; Increased time required   Sit to Supine 5  Supervision   Additional items Assist x 1   Transfers   Sit to Stand 4  Minimal assistance   Additional items Assist x 1; Increased time required;Verbal cues   Stand to Sit 4  Minimal assistance   Additional items Assist x 1; Increased time required;Verbal cues   Toilet transfer 4  Minimal assistance   Additional items Assist x 1; Increased time required;Verbal cues;Standard toilet  (use of grab bar)   Additional Comments cue sfor hand placement, cues for use of grab bar with troilet transfers, step by step cues for performanc and complettion of all tasks  Ambulation/Elevation   Gait pattern Poor UE support; Improper Weight shift;Retropulsion;Narrow SOY;Scissoring; Steppage;Decreased foot clearance; Ataxia; Excessively slow;Decreased heel strike   Gait Assistance 4  Minimal assist Additional items Assist x 1;Verbal cues   Assistive Device Rolling walker   Distance 15' x1, 70'x1,   Balance   Static Sitting Fair +   Dynamic Sitting Fair   Static Standing Fair   Dynamic Standing Poor +   Ambulatory Poor +   Activity Tolerance   Activity Tolerance Patient limited by pain   Assessment   Prognosis Guarded   Problem List Decreased strength;Decreased endurance; Impaired balance;Decreased mobility; Decreased coordination;Decreased cognition; Impaired judgement;Decreased safety awareness;Pain   Assessment t  Was seen for PT treatment session as per POC  Pt  Rec;d supine in bed  Pt  performs supine to sit and iut to supine with supervision with increased time  Pt is requiring min assist x1 for transfers to and from all surfaces and surface heights  Pt  requres cues for hand placement and use of grab bar with toilet transfers  Pt ambulales with use of Rw,  Cues to maintain  of walker at all times, cues for proper and safe use of rw and cues for improved quality of gait  Gait deviations as noted in flow sheet cues and assistance required for steering and walker management  Pt  continues to demontrate unseady gait with retropulsion and posteior lob when taking steps backward  Pt  requires step by step cues for sequencing of all tasks for performance and completion of all tasks  Pt  Is requiring min assist x1 for ambulation with standby assist of another for safety and and difficulty with following commands  Pt  Remains functioning well below baseline level of mobility, increased risk for falls  Would continue to benefit from therapy services to facilitate recovery and reduce fall risk  Recommend inpt rehab at d/c with questionable need for placement due to current cognitive deficits and questionable recovery and cocnern for family's ability to care for pt at current LOF     Goals   Patient Goals none stated at this time due to cognition   STG Expiration Date 11/02/22   PT Treatment Day 11 Plan   Treatment/Interventions Functional transfer training; Therapeutic exercise; Endurance training;Cognitive reorientation;Patient/family training;Equipment eval/education; Bed mobility;Gait training;Spoke to nursing;OT   Progress Slow progress, cognitive deficits   PT Frequency Other (Comment)  (2-4x/ week)   Recommendation   PT Discharge Recommendation Post acute rehabilitation services   AM-PAC Basic Mobility Inpatient   Turning in Bed Without Bedrails 3   Lying on Back to Sitting on Edge of Flat Bed 3   Moving Bed to Chair 3   Standing Up From Chair 3   Walk in Room 3   Climb 3-5 Stairs 3   Basic Mobility Inpatient Raw Score 18   Basic Mobility Standardized Score 41 05   Highest Level Of Mobility   JH-HLM Goal 6: Walk 10 steps or more   JH-HLM Achieved 7: Walk 25 feet or more   Education   Education Provided Mobility training;Assistive device   Patient Reinforcement needed   End of Consult   Patient Position at End of Consult Supine;Bed/Chair alarm activated; All needs within reach   End of Consult Comments 1:1 present   Isaac Ramos

## 2022-10-24 NOTE — PROGRESS NOTES
Progress Note - Infectious Disease   Vinie Setting 45 y o  female MRN: [id number]  Unit/Bed#: [place] [address] -[id number] Encounter: [id number]    Impression/Plan:  1  MSSA bacteremia  Patient was found down for prolonged period of time with evidence of multiple wounds on admission  Suspect cutaneous vs endovascular source, likely phlebitis as there is report of RUE erythema surrounding prior IV in addition to palpable cord in left antecub  TTE negative for obvious vegetation  CHERYLE was planned but then cancelled due to tenuous respiratory status  Repeat TTE still without good visualization of aortic valve  The patient is currently receiving IV cefazolin which she is tolerating without difficulty  Anticipate 4 weeks of IV antibiotic   -continue IV cefazolin through 10/25/2022 for 4 weeks of antibiotic treatment after cleared blood cultures  -weekly CBCD and creatinine while on IV antibiotic  -monitor vitals  -patient will need surveillance blood cultures 2 weeks after finishing IV antibiotic treatment, to be drawn 11/8/2022     2  Toxic Metabolic encephalopathy   Likely multifactorial etiology with uremia playing a role  This was initially thought to be secondary to gabapentin and morphine use in the setting of renal failure, hypotension, rhabdomyolysis   MRI brain negative   Lumbar puncture with opening pressure 26  Negative ME panel  CSF fluid culture not collected   The most likely to be secondary to metabolic issues, medication effect, and possible Wernicke's encephalopathy   Psychiatric issues also likely playing a role   Neurology has reassessed patient  Mental status remains altered but she is more interactive today  She is being considered for [facility] brain injury unit   -serial neuro exams  -treat metabolic issues and psychiatric issues  -continue follow up with behavioral health  -continue follow up with neurology   -no additional ID workup needed     3  Acute renal failure   Creatinine on admission 5 88 with CK over 32,000  Creatinine reached plateau, now down trending   Likely multifactorial etiology in the setting of rhabdomyolysis and dehydration  Likely ANUJA/ATN    Most recent creatinine was improved to 0 94   -monitor creatinine  -dose adjust antibiotics for renal function as needed  -avoid nephrotoxins  -continue follow up with nephrology     4  Tobacco abuse   Encourage cessation as recommended by primary team   -NRT per primary service     5  Antibiotic allergy  Reports hives with penicillin  Patient has been tolerating cephalosporin without difficulty  -monitor patient for adverse medication reactions    Above plan was discussed in detail with patient at the bedside  Above plan was discussed in detail with SLIM  Antibiotics:  Cefazolin 27  Antibiotic 27    Subjective:  Patient appears more appropriate and interactive  She denies chills, sweats, shakes; no nausea, vomiting, abdominal pain, diarrhea, or dysuria; no cough, shortness of breath, or chest pain  No new symptoms  Remains on 1:1, still very impulsive and unpredictable per staff  Objective:  Vitals:  Temp:  [97 9 °F (36 6 °C)-98 5 °F (36 9 °C)] 97 9 °F (36 6 °C)  HR:  [103-107] 103  Resp:  [18] 18  BP: ()/(64-84) 128/83  SpO2:  [96 %-99 %] 97 %  Temp (24hrs), Av 2 °F (36 8 °C), Min:97 9 °F (36 6 °C), Max:98 5 °F (36 9 °C)  Current: Temperature: 97 9 °F (36 6 °C)    Physical Exam:   General Appearance:  Alert, interactive and more appropriate with answering questions today  She appears comfortable laying on R side in bed, nontoxic, in no acute distress  Throat: Oropharynx moist without lesions  Lungs:   Clear to auscultation bilaterally; no wheezes, rhonchi or rales; respirations unlabored on room air  Heart:  Tachycardic; no murmur, rub or gallop  Abdomen:   Soft, non-tender, non-distended, positive bowel sounds  Extremities: No clubbing or cyanosis, no edema     Skin: No new rashes, lesions, or draining wounds noted on exposed skin      Labs, Imaging, & Other studies:   All pertinent labs and imaging studies were personally reviewed  Results from last 7 days   Lab Units 10/24/22  0530 10/19/22  0445 10/18/22  0436   WBC Thousand/uL 8 87 10 01 9 10   HEMOGLOBIN g/dL 11 7 10 7* 10 3*   PLATELETS Thousands/uL 310 354 357     Results from last 7 days   Lab Units 10/24/22  0530   POTASSIUM mmol/L 4 1   CHLORIDE mmol/L 100   CO2 mmol/L 25   BUN mg/dL 15   CREATININE mg/dL 0 94   EGFR ml/min/1 73sq m 68   CALCIUM mg/dL 9 5

## 2022-10-24 NOTE — OCCUPATIONAL THERAPY NOTE
Occupational Therapy Progress Note     Patient Name: Mary Overton  Today's Date: 10/24/2022  Problem List  Principal Problem:    Toxic metabolic encephalopathy  Active Problems:    Depression    Tobacco abuse    DDD (degenerative disc disease), lumbosacral    ARF (acute renal failure) (HCC)    Transaminitis    Abnormal CT of the chest    Traumatic rhabdomyolysis (HCC)    D-dimer, elevated    Leg edema, right    Localized swelling of right upper extremity    Bacteremia due to methicillin susceptible Staphylococcus aureus (MSSA)    Pulmonary edema    Aspiration pneumonitis (HCC)    Hypokalemia    Hypernatremia    Diarrhea    Mild protein-calorie malnutrition ([facility] [address] )          10/24/22 0915   OT Last Visit   OT Visit Date 10/24/22   Note Type   Note Type Treatment   Pain Assessment   Pain Assessment Tool 0-10   Pain Score 4   Pain Location/Orientation Orientation: Bilateral;[place] Foot   Pain Onset/Description Onset: Ongoing   Patient's Stated Pain Goal No pain   Hospital Pain Intervention(s) Repositioned; Ambulation/increased activity; Emotional support; Rest   Restrictions/Precautions   Weight Bearing Precautions Per Order No   Other Precautions Cognitive; Chair Alarm; Bed Alarm; Fall Risk;Pain;1:1   ADL   Grooming Assistance 4  Minimal Assistance   Grooming Deficit Wash/dry hands;Brushing hair; Other (Comment)   Grooming Comments Min x 1 to perform grooming tasks standing at bathroom sink  Cues for sequencing of tasks and locations of all needed objects  UB Dressing Assistance 3  Moderate Assistance   UB Dressing Deficit Setup;Pull around back   UB Dressing Comments Changed hospital gown w/ cues for sequencing step by step  LB Dressing Assistance 3  Moderate Assistance   LB Dressing Deficit Don/doff R sock; Don/doff L sock; Other (Comment)  (Gown managment)   LB Dressing Comments (S) for seated tasks using tailor sit method  Steadying assist in stance for all standing portions     [address]  4  Minimal Assistance   Toileting Deficit Perineal hygiene; Other (Comment)  (Backside hygiene)   Toileting Comments (S) for seated hygiene  Steadying assist for standing portions  Functional Standing Tolerance   Time 3-4 mins   Activity Hygiene tasks in bathroom   Bed Mobility   Supine to Sit 5  Supervision   Additional items Bedrails;HOB elevated   Sit to Supine 5  Supervision   Additional Comments Remains in bed w/ all needs and alarm engaged  1:1 present  Transfers   Sit to Stand 4  Minimal assistance   Additional items Assist x 1;Verbal cues   Stand to Sit 4  Minimal assistance   Additional items Assist x 1;Verbal cues   Stand pivot 4  Minimal assistance   Additional items Assist x 1;Other  (Min x 1 + Close S of 2nd for safety  RW )   Additional Comments Cues for hand placement  Cues needed for sequencing of all tasks  Functional Mobility   Functional Mobility 4  Minimal assistance   Additional Comments Min x 1 + Close S of 2nd for safety  RW  Required cues for all sequencing for safety  Cues to keep walker on the ground and stay inside walker  Kept trying to grab items w/ hands  Scissoring of legs  Steppage gait  Standing on 1 foot at times  Very unsteady  Toilet Transfers   Toilet Transfers Minimal assistance   Toilet Transfers Comments Min x 1 w/ cues to use GB for increased support  Subjective   Subjective Minimal verbalizations t/o session   Cognition   Overall Cognitive Status Impaired   Arousal/Participation Responsive   Attention Difficulty attending to directions   Orientation Level Oriented to person   Memory Decreased short term memory;Decreased recall of recent events;Decreased recall of precautions   Following Commands Follows one step commands with increased time or repetition   Comments Oriented to self only  Unable to select correct answer when given choices  Difficulties following directions  Decreased safety     Activity Tolerance   Activity Tolerance Treatment limited secondary to medical complications (Comment)  (Cognition)   Medical Staff Made Aware Elias JIANG & RN, Daija Phan   Assessment   Assessment Pt seen for OT tx session w/ focus on bed mobility, self care, transfers, functional mobility, and activity tolerance  C/o B foot pain when ambulating  Oriented to self only  Difficulties following directions  Improvement noted in self care tasks but requires increased assist for steadying during standing portions  Cues needed for sequencing and safety  Unsteady on feet  Cues to use walker properly and keep walker on the ground  Minimal verbalizations  Remains below baseline despite improvements  Continue to recommend STR to achieve optimal performance and decrease caregiver burden  Plan   Treatment Interventions ADL retraining;Functional transfer training; Endurance training;Cognitive reorientation;Patient/family training;Equipment evaluation/education; Activityengagement   Goal Expiration Date 10/31/22   OT Treatment Day 10   OT Frequency 3-5x/wk   Recommendation   OT Discharge Recommendation Post acute rehabilitation services   AM-PAC Daily Activity Inpatient   Lower Body Dressing 2   Bathing 2   Toileting 3   Upper Body Dressing 2   Grooming 3   Eating 4   Daily Activity Raw Score 16   Daily Activity Standardized Score (Calc for Raw Score >=11) 35 96   AM-PAC Applied Cognition Inpatient   Following a Speech/Presentation 3   Understanding Ordinary Conversation 3   Taking Medications 2   Remembering Where Things Are Placed or Put Away 2   Remembering List of 4-5 Errands 1   Taking Care of Complicated Tasks 1   Applied Cognition Raw Score 12   Applied Cognition Standardized Score 28 82        Osmel Fuchs MS, OTR/L

## 2022-10-24 NOTE — ASSESSMENT & PLAN NOTE
· ANUJA/ATN secondary to rhabdomyolysis    No evidence of renal obstruction on imaging    Kidney function has returned to normal

## 2022-10-25 PROBLEM — E44.0 MODERATE PROTEIN-CALORIE MALNUTRITION (HCC): Status: ACTIVE | Noted: 2022-10-25

## 2022-10-25 RX ADMIN — GABAPENTIN 300 MG: 300 CAPSULE ORAL at 17:19

## 2022-10-25 RX ADMIN — VENLAFAXINE HYDROCHLORIDE 187.5 MG: 37.5 CAPSULE, EXTENDED RELEASE ORAL at 08:13

## 2022-10-25 RX ADMIN — MICONAZOLE NITRATE: 20 CREAM TOPICAL at 17:23

## 2022-10-25 RX ADMIN — CEFAZOLIN SODIUM 2000 MG: 2 SOLUTION INTRAVENOUS at 13:47

## 2022-10-25 RX ADMIN — HEPARIN SODIUM 5000 UNITS: 5000 INJECTION INTRAVENOUS; SUBCUTANEOUS at 13:47

## 2022-10-25 RX ADMIN — GABAPENTIN 300 MG: 300 CAPSULE ORAL at 21:47

## 2022-10-25 RX ADMIN — HEPARIN SODIUM 5000 UNITS: 5000 INJECTION INTRAVENOUS; SUBCUTANEOUS at 21:47

## 2022-10-25 RX ADMIN — Medication 1 PATCH: at 08:12

## 2022-10-25 RX ADMIN — MICONAZOLE NITRATE: 20 CREAM TOPICAL at 08:12

## 2022-10-25 RX ADMIN — CEFAZOLIN SODIUM 2000 MG: 2 SOLUTION INTRAVENOUS at 05:41

## 2022-10-25 RX ADMIN — Medication 250 MG: at 08:12

## 2022-10-25 RX ADMIN — QUETIAPINE FUMARATE 50 MG: 25 TABLET ORAL at 17:19

## 2022-10-25 RX ADMIN — GABAPENTIN 300 MG: 300 CAPSULE ORAL at 08:12

## 2022-10-25 RX ADMIN — AMLODIPINE BESYLATE 10 MG: 10 TABLET ORAL at 08:12

## 2022-10-25 RX ADMIN — QUETIAPINE FUMARATE 50 MG: 25 TABLET ORAL at 08:12

## 2022-10-25 RX ADMIN — METOPROLOL TARTRATE 25 MG: 25 TABLET, FILM COATED ORAL at 08:12

## 2022-10-25 RX ADMIN — HEPARIN SODIUM 5000 UNITS: 5000 INJECTION INTRAVENOUS; SUBCUTANEOUS at 05:42

## 2022-10-25 RX ADMIN — METOPROLOL TARTRATE 25 MG: 25 TABLET, FILM COATED ORAL at 21:47

## 2022-10-25 RX ADMIN — Medication 250 MG: at 17:19

## 2022-10-25 RX ADMIN — CEFAZOLIN SODIUM 2000 MG: 2 SOLUTION INTRAVENOUS at 21:51

## 2022-10-25 NOTE — PLAN OF CARE
Problem: PAIN - ADULT  Goal: Verbalizes/displays adequate comfort level or baseline comfort level  Description: Interventions:  - Encourage patient to monitor pain and request assistance  - Assess pain using appropriate pain scale  - Administer analgesics based on type and severity of pain and evaluate response  - Implement non-pharmacological measures as appropriate and evaluate response  - Consider cultural and social influences on pain and pain management  - Notify physician/advanced practitioner if interventions unsuccessful or patient reports new pain  Outcome: Progressing     Problem: INFECTION - ADULT  Goal: Absence or prevention of progression during hospitalization  Description: INTERVENTIONS:  - Assess and monitor for signs and symptoms of infection  - Monitor lab/diagnostic results  - Monitor all insertion sites, i e  indwelling lines, tubes, and drains  - Monitor endotracheal if appropriate and nasal secretions for changes in amount and color  - [place] appropriate cooling/warming therapies per order  - Administer medications as ordered  - Instruct and encourage patient and family to use good hand hygiene technique  - Identify and instruct in appropriate isolation precautions for identified infection/condition  Outcome: Progressing     Problem: SAFETY ADULT  Goal: Patient will remain free of falls  Description: INTERVENTIONS:  - Educate patient/family on patient safety including physical limitations  - Instruct patient to call for assistance with activity   - Consult OT/PT to assist with strengthening/mobility   - Keep Call bell within reach  - Keep bed low and locked with side rails adjusted as appropriate  - Keep care items and personal belongings within reach  - Initiate and maintain comfort rounds  - Make Fall Risk Sign visible to staff  - Offer Toileting every 2 Hours, in advance of need  - Initiate/Maintain bed alarm  - Obtain necessary fall risk management equipment: walker  - Apply yellow socks and bracelet for high fall risk patients  - Consider moving patient to room near nurses station  Outcome: Progressing  Goal: Maintain or return to baseline ADL function  Description: INTERVENTIONS:  -  Assess patient's ability to carry out ADLs; assess patient's baseline for ADL function and identify physical deficits which impact ability to perform ADLs (bathing, care of mouth/teeth, toileting, grooming, dressing, etc )  - Assess/evaluate cause of self-care deficits   - Assess range of motion  - Assess patient's mobility; develop plan if impaired  - Assess patient's need for assistive devices and provide as appropriate  - Encourage maximum independence but intervene and supervise when necessary  - Involve family in performance of ADLs  - Assess for home care needs following discharge   - Consider OT consult to assist with ADL evaluation and planning for discharge  - Provide patient education as appropriate  Outcome: Progressing  Goal: Maintains/Returns to pre admission functional level  Description: INTERVENTIONS:  - Perform BMAT or MOVE assessment daily    - Set and communicate daily mobility goal to care team and patient/family/caregiver  - Collaborate with rehabilitation services on mobility goals if consulted  - Perform Range of Motion 4-6 times a day  - Reposition patient every 2 hours    - Dangle patient 3-4 times a day  - Stand patient 3 times a day  - Ambulate patient 3-4 times a day  - Out of bed for toileting  - Record patient progress and toleration of activity level   Outcome: Progressing     Problem: DISCHARGE PLANNING  Goal: Discharge to home or other facility with appropriate resources  Description: INTERVENTIONS:  - Identify barriers to discharge w/patient and caregiver  - Arrange for needed discharge resources and transportation as appropriate  - Identify discharge learning needs (meds, wound care, etc )  - Arrange for interpretive services to assist at discharge as needed  - Refer to Case Management Department for coordinating discharge planning if the patient needs post-hospital services based on physician/advanced practitioner order or complex needs related to functional status, cognitive ability, or social support system  Outcome: Progressing     Problem: Knowledge Deficit  Goal: Patient/family/caregiver demonstrates understanding of disease process, treatment plan, medications, and discharge instructions  Description: Complete learning assessment and assess knowledge base    Interventions:  - Provide teaching at level of understanding  - Provide teaching via preferred learning methods  Outcome: Progressing     Problem: Potential for Falls  Goal: Patient will remain free of falls  Description: INTERVENTIONS:  - Educate patient/family on patient safety including physical limitations  - Instruct patient to call for assistance with activity   - Consult OT/PT to assist with strengthening/mobility   - Keep Call bell within reach  - Keep bed low and locked with side rails adjusted as appropriate  - Keep care items and personal belongings within reach  - Initiate and maintain comfort rounds  - Make Fall Risk Sign visible to staff  - Offer Toileting every 2 Hours, in advance of need  - Initiate/Maintain bed alarm  - Obtain necessary fall risk management equipment: walker  - Apply yellow socks and bracelet for high fall risk patients  - Consider moving patient to room near nurses station  Outcome: Progressing     Problem: MOBILITY - ADULT  Goal: Maintain or return to baseline ADL function  Description: INTERVENTIONS:  -  Assess patient's ability to carry out ADLs; assess patient's baseline for ADL function and identify physical deficits which impact ability to perform ADLs (bathing, care of mouth/teeth, toileting, grooming, dressing, etc )  - Assess/evaluate cause of self-care deficits   - Assess range of motion  - Assess patient's mobility; develop plan if impaired  - Assess patient's need for assistive devices and provide as appropriate  - Encourage maximum independence but intervene and supervise when necessary  - Involve family in performance of ADLs  - Assess for home care needs following discharge   - Consider OT consult to assist with ADL evaluation and planning for discharge  - Provide patient education as appropriate  Outcome: Progressing  Goal: Maintains/Returns to pre admission functional level  Description: INTERVENTIONS:  - Perform BMAT or MOVE assessment daily    - Set and communicate daily mobility goal to care team and patient/family/caregiver  - Collaborate with rehabilitation services on mobility goals if consulted  - Perform Range of Motion 4-6 times a day  - Reposition patient every 2 hours  - Dangle patient 3-4 times a day  - Stand patient 3 times a day  - Ambulate patient 3-4 times a day  - Out of bed for toileting  - Record patient progress and toleration of activity level   Outcome: Progressing     Problem: Prexisting or High Potential for Compromised Skin Integrity  Goal: Skin integrity is maintained or improved  Description: INTERVENTIONS:  - Identify patients at risk for skin breakdown  - Assess and monitor skin integrity  - Assess and monitor nutrition and hydration status  - Monitor labs   - Assess for incontinence   - Turn and reposition patient  - Assist with mobility/ambulation  - Relieve pressure over bony prominences  - Avoid friction and shearing  - Provide appropriate hygiene as needed including keeping skin clean and dry  - Evaluate need for skin moisturizer/barrier cream  - Collaborate with interdisciplinary team   - Patient/family teaching  - Consider wound care consult   Outcome: Progressing     Problem: Nutrition/Hydration-ADULT  Goal: Nutrient/Hydration intake appropriate for improving, restoring or maintaining nutritional needs  Description: Monitor and assess patient's nutrition/hydration status for malnutrition   Collaborate with interdisciplinary team and initiate plan and interventions as ordered  Monitor patient's weight and dietary intake as ordered or per policy  Utilize nutrition screening tool and intervene as necessary  Determine patient's food preferences and provide high-protein, high-caloric foods as appropriate  INTERVENTIONS:  - Monitor oral intake, urinary output, labs, and treatment plans  - Assess nutrition and hydration status and recommend course of action  - Evaluate amount of meals eaten  - Assist patient with eating if necessary   - Allow adequate time for meals  - Recommend/ encourage appropriate diets, oral nutritional supplements, and vitamin/mineral supplements  - Order, calculate, and assess calorie counts as needed  - Recommend, monitor, and adjust tube feedings and TPN/PPN based on assessed needs  - Assess need for intravenous fluids  - Provide specific nutrition/hydration education as appropriate  - Include patient/family/caregiver in decisions related to nutrition  Outcome: Progressing     Problem: NEUROSENSORY - ADULT  Goal: Achieves maximal functionality and self care  Description: INTERVENTIONS  - Monitor swallowing and airway patency with patient fatigue and changes in neurological status  - Encourage and assist patient to increase activity and self care     - Encourage visually impaired, hearing impaired and aphasic patients to use assistive/communication devices  Outcome: Progressing  Goal: Achieves stable or improved neurological status  Description: INTERVENTIONS  - Monitor and report changes in neurological status  - Monitor vital signs such as temperature, blood pressure, glucose, and any other labs ordered   - Initiate measures to prevent increased intracranial pressure  - Monitor for seizure activity and implement precautions if appropriate      Outcome: Progressing     Problem: CARDIOVASCULAR - ADULT  Goal: Maintains optimal cardiac output and hemodynamic stability  Description: INTERVENTIONS:  - Monitor I/O, vital signs and rhythm  - Monitor for S/S and trends of decreased cardiac output  - Administer and titrate ordered vasoactive medications to optimize hemodynamic stability  - Assess quality of pulses, skin color and temperature  - Assess for signs of decreased coronary artery perfusion  - Instruct patient to report change in severity of symptoms  Outcome: Progressing     Problem: RESPIRATORY - ADULT  Goal: Achieves optimal ventilation and oxygenation  Description: INTERVENTIONS:  - Assess for changes in respiratory status  - Assess for changes in mentation and behavior  - Position to facilitate oxygenation and minimize respiratory effort  - Oxygen administered by appropriate delivery if ordered  - Initiate smoking cessation education as indicated  - Encourage broncho-pulmonary hygiene including cough, deep breathe, Incentive Spirometry  - Assess the need for suctioning and aspirate as needed  - Assess and instruct to report SOB or any respiratory difficulty  - Respiratory Therapy support as indicated  Outcome: Progressing     Problem: GASTROINTESTINAL - ADULT  Goal: Maintains adequate nutritional intake  Description: INTERVENTIONS:  - Monitor percentage of each meal consumed  - Identify factors contributing to decreased intake, treat as appropriate  - Assist with meals as needed  - Monitor I&O, weight, and lab values if indicated  - Obtain nutrition services referral as needed  Outcome: Progressing     Problem: METABOLIC, FLUID AND ELECTROLYTES - ADULT  Goal: Electrolytes maintained within normal limits  Description: INTERVENTIONS:  - Monitor labs and assess patient for signs and symptoms of electrolyte imbalances  - Administer electrolyte replacement as ordered  - Monitor response to electrolyte replacements, including repeat lab results as appropriate  - Instruct patient on fluid and nutrition as appropriate  Outcome: Progressing  Goal: Fluid balance maintained  Description: INTERVENTIONS:  - Monitor labs   - Monitor I/O and WT  - Instruct patient on fluid and nutrition as appropriate  - Assess for signs & symptoms of volume excess or deficit  Outcome: Progressing     Problem: SKIN/TISSUE INTEGRITY - ADULT  Goal: Skin Integrity remains intact(Skin Breakdown Prevention)  Description: Assess:  -Perform [place] assessment every shift  -Clean and moisturize skin every day  -Inspect skin when repositioning, toileting, and assisting with ADLS  -Assess extremities for adequate circulation and sensation     Bed Management:  -Have minimal linens on bed & keep smooth, unwrinkled  -Change linens as needed when moist or perspiring  -Avoid sitting or lying in one position for more than 2 hours while in bed  -Keep HOB at 45 degrees     Toileting:  -Offer bedside commode  -Assess for incontinence every 2 hours  -Use incontinent care products after each incontinent episode such as brief    Activity:  -Mobilize patient 3-4 times a day  -Encourage activity and walks on unit  -Encourage or provide ROM exercises   -Turn and reposition patient every 2 Hours  -Use appropriate equipment to lift or move patient in bed  -Instruct/ Assist with weight shifting every 2 when out of bed in chair  -Consider limitation of chair time 2 hour intervals    Skin Care:  -Avoid use of baby powder, tape, friction and shearing, hot water or constrictive clothing  -Relieve pressure over bony prominences using pillows  -Do not massage red bony areas    Next Steps:  -Teach patient strategies to minimize risks such as walker  -Consider consults to  interdisciplinary teams such as wound  Outcome: Progressing  Goal: Incision(s), wounds(s) or drain site(s) healing without S/S of infection  Description: INTERVENTIONS  - Assess and document dressing, incision, wound bed, drain sites and surrounding tissue  - Provide patient and family education  - Perform skin care/dressing changes every PRN  Outcome: Progressing     Problem: MUSCULOSKELETAL - ADULT  Goal: Maintain or return mobility to safest level of function  Description: INTERVENTIONS:  - Assess patient's ability to carry out ADLs; assess patient's baseline for ADL function and identify physical deficits which impact ability to perform ADLs (bathing, care of mouth/teeth, toileting, grooming, dressing, etc )  - Assess/evaluate cause of self-care deficits   - Assess range of motion  - Assess patient's mobility  - Assess patient's need for assistive devices and provide as appropriate  - Encourage maximum independence but intervene and supervise when necessary  - Involve family in performance of ADLs  - Assess for home care needs following discharge   - Consider OT consult to assist with ADL evaluation and planning for discharge  - Provide patient education as appropriate  Outcome: Progressing

## 2022-10-25 NOTE — ASSESSMENT & PLAN NOTE
· Secondary to rhabdomyolysis  No evidence of liver injury on CT imaging  · Seen by GI  Hepatic duplex also negative

## 2022-10-25 NOTE — PLAN OF CARE
Problem: PAIN - ADULT  Goal: Verbalizes/displays adequate comfort level or baseline comfort level  Description: Interventions:  - Encourage patient to monitor pain and request assistance  - Assess pain using appropriate pain scale  - Administer analgesics based on type and severity of pain and evaluate response  - Implement non-pharmacological measures as appropriate and evaluate response  - Consider cultural and social influences on pain and pain management  - Notify physician/advanced practitioner if interventions unsuccessful or patient reports new pain  Outcome: Progressing     Problem: INFECTION - ADULT  Goal: Absence or prevention of progression during hospitalization  Description: INTERVENTIONS:  - Assess and monitor for signs and symptoms of infection  - Monitor lab/diagnostic results  - Monitor all insertion sites, i e  indwelling lines, tubes, and drains  - Monitor endotracheal if appropriate and nasal secretions for changes in amount and color  - [place] appropriate cooling/warming therapies per order  - Administer medications as ordered  - Instruct and encourage patient and family to use good hand hygiene technique  - Identify and instruct in appropriate isolation precautions for identified infection/condition  Outcome: Progressing     Problem: SAFETY ADULT  Goal: Patient will remain free of falls  Description: INTERVENTIONS:  - Educate patient/family on patient safety including physical limitations  - Instruct patient to call for assistance with activity   - Consult OT/PT to assist with strengthening/mobility   - Keep Call bell within reach  - Keep bed low and locked with side rails adjusted as appropriate  - Keep care items and personal belongings within reach  - Initiate and maintain comfort rounds  - Make Fall Risk Sign visible to staff  - Offer Toileting every 2 Hours, in advance of need  - Initiate/Maintain bed alarm  - Obtain necessary fall risk management equipment: walker  - Apply yellow socks and bracelet for high fall risk patients  - Consider moving patient to room near nurses station  Outcome: Progressing  Goal: Maintain or return to baseline ADL function  Description: INTERVENTIONS:  -  Assess patient's ability to carry out ADLs; assess patient's baseline for ADL function and identify physical deficits which impact ability to perform ADLs (bathing, care of mouth/teeth, toileting, grooming, dressing, etc )  - Assess/evaluate cause of self-care deficits   - Assess range of motion  - Assess patient's mobility; develop plan if impaired  - Assess patient's need for assistive devices and provide as appropriate  - Encourage maximum independence but intervene and supervise when necessary  - Involve family in performance of ADLs  - Assess for home care needs following discharge   - Consider OT consult to assist with ADL evaluation and planning for discharge  - Provide patient education as appropriate  Outcome: Progressing  Goal: Maintains/Returns to pre admission functional level  Description: INTERVENTIONS:  - Perform BMAT or MOVE assessment daily    - Set and communicate daily mobility goal to care team and patient/family/caregiver  - Collaborate with rehabilitation services on mobility goals if consulted  - Perform Range of Motion 4-6 times a day  - Reposition patient every 2 hours    - Dangle patient 3-4 times a day  - Stand patient 3 times a day  - Ambulate patient 3-4 times a day  - Out of bed for toileting  - Record patient progress and toleration of activity level   Outcome: Progressing     Problem: DISCHARGE PLANNING  Goal: Discharge to home or other facility with appropriate resources  Description: INTERVENTIONS:  - Identify barriers to discharge w/patient and caregiver  - Arrange for needed discharge resources and transportation as appropriate  - Identify discharge learning needs (meds, wound care, etc )  - Arrange for interpretive services to assist at discharge as needed  - Refer to Case Management Department for coordinating discharge planning if the patient needs post-hospital services based on physician/advanced practitioner order or complex needs related to functional status, cognitive ability, or social support system  Outcome: Progressing     Problem: Knowledge Deficit  Goal: Patient/family/caregiver demonstrates understanding of disease process, treatment plan, medications, and discharge instructions  Description: Complete learning assessment and assess knowledge base    Interventions:  - Provide teaching at level of understanding  - Provide teaching via preferred learning methods  Outcome: Progressing     Problem: Potential for Falls  Goal: Patient will remain free of falls  Description: INTERVENTIONS:  - Educate patient/family on patient safety including physical limitations  - Instruct patient to call for assistance with activity   - Consult OT/PT to assist with strengthening/mobility   - Keep Call bell within reach  - Keep bed low and locked with side rails adjusted as appropriate  - Keep care items and personal belongings within reach  - Initiate and maintain comfort rounds  - Make Fall Risk Sign visible to staff  - Offer Toileting every 2 Hours, in advance of need  - Initiate/Maintain bed alarm  - Obtain necessary fall risk management equipment: walker  - Apply yellow socks and bracelet for high fall risk patients  - Consider moving patient to room near nurses station  Outcome: Progressing     Problem: MOBILITY - ADULT  Goal: Maintain or return to baseline ADL function  Description: INTERVENTIONS:  -  Assess patient's ability to carry out ADLs; assess patient's baseline for ADL function and identify physical deficits which impact ability to perform ADLs (bathing, care of mouth/teeth, toileting, grooming, dressing, etc )  - Assess/evaluate cause of self-care deficits   - Assess range of motion  - Assess patient's mobility; develop plan if impaired  - Assess patient's need for assistive devices and provide as appropriate  - Encourage maximum independence but intervene and supervise when necessary  - Involve family in performance of ADLs  - Assess for home care needs following discharge   - Consider OT consult to assist with ADL evaluation and planning for discharge  - Provide patient education as appropriate  Outcome: Progressing  Goal: Maintains/Returns to pre admission functional level  Description: INTERVENTIONS:  - Perform BMAT or MOVE assessment daily    - Set and communicate daily mobility goal to care team and patient/family/caregiver  - Collaborate with rehabilitation services on mobility goals if consulted  - Perform Range of Motion 4-6 times a day  - Reposition patient every 2 hours  - Dangle patient 3-4 times a day  - Stand patient 3 times a day  - Ambulate patient 3-4 times a day  - Out of bed for toileting  - Record patient progress and toleration of activity level   Outcome: Progressing     Problem: Prexisting or High Potential for Compromised Skin Integrity  Goal: Skin integrity is maintained or improved  Description: INTERVENTIONS:  - Identify patients at risk for skin breakdown  - Assess and monitor skin integrity  - Assess and monitor nutrition and hydration status  - Monitor labs   - Assess for incontinence   - Turn and reposition patient  - Assist with mobility/ambulation  - Relieve pressure over bony prominences  - Avoid friction and shearing  - Provide appropriate hygiene as needed including keeping skin clean and dry  - Evaluate need for skin moisturizer/barrier cream  - Collaborate with interdisciplinary team   - Patient/family teaching  - Consider wound care consult   Outcome: Progressing     Problem: Nutrition/Hydration-ADULT  Goal: Nutrient/Hydration intake appropriate for improving, restoring or maintaining nutritional needs  Description: Monitor and assess patient's nutrition/hydration status for malnutrition   Collaborate with interdisciplinary team and initiate plan and interventions as ordered  Monitor patient's weight and dietary intake as ordered or per policy  Utilize nutrition screening tool and intervene as necessary  Determine patient's food preferences and provide high-protein, high-caloric foods as appropriate  INTERVENTIONS:  - Monitor oral intake, urinary output, labs, and treatment plans  - Assess nutrition and hydration status and recommend course of action  - Evaluate amount of meals eaten  - Assist patient with eating if necessary   - Allow adequate time for meals  - Recommend/ encourage appropriate diets, oral nutritional supplements, and vitamin/mineral supplements  - Order, calculate, and assess calorie counts as needed  - Recommend, monitor, and adjust tube feedings and TPN/PPN based on assessed needs  - Assess need for intravenous fluids  - Provide specific nutrition/hydration education as appropriate  - Include patient/family/caregiver in decisions related to nutrition  Outcome: Progressing     Problem: NEUROSENSORY - ADULT  Goal: Achieves maximal functionality and self care  Description: INTERVENTIONS  - Monitor swallowing and airway patency with patient fatigue and changes in neurological status  - Encourage and assist patient to increase activity and self care     - Encourage visually impaired, hearing impaired and aphasic patients to use assistive/communication devices  Outcome: Progressing  Goal: Achieves stable or improved neurological status  Description: INTERVENTIONS  - Monitor and report changes in neurological status  - Monitor vital signs such as temperature, blood pressure, glucose, and any other labs ordered   - Initiate measures to prevent increased intracranial pressure  - Monitor for seizure activity and implement precautions if appropriate      Outcome: Progressing     Problem: CARDIOVASCULAR - ADULT  Goal: Maintains optimal cardiac output and hemodynamic stability  Description: INTERVENTIONS:  - Monitor I/O, vital signs and rhythm  - Monitor for S/S and trends of decreased cardiac output  - Administer and titrate ordered vasoactive medications to optimize hemodynamic stability  - Assess quality of pulses, skin color and temperature  - Assess for signs of decreased coronary artery perfusion  - Instruct patient to report change in severity of symptoms  Outcome: Progressing     Problem: RESPIRATORY - ADULT  Goal: Achieves optimal ventilation and oxygenation  Description: INTERVENTIONS:  - Assess for changes in respiratory status  - Assess for changes in mentation and behavior  - Position to facilitate oxygenation and minimize respiratory effort  - Oxygen administered by appropriate delivery if ordered  - Initiate smoking cessation education as indicated  - Encourage broncho-pulmonary hygiene including cough, deep breathe, Incentive Spirometry  - Assess the need for suctioning and aspirate as needed  - Assess and instruct to report SOB or any respiratory difficulty  - Respiratory Therapy support as indicated  Outcome: Progressing     Problem: GASTROINTESTINAL - ADULT  Goal: Maintains adequate nutritional intake  Description: INTERVENTIONS:  - Monitor percentage of each meal consumed  - Identify factors contributing to decreased intake, treat as appropriate  - Assist with meals as needed  - Monitor I&O, weight, and lab values if indicated  - Obtain nutrition services referral as needed  Outcome: Progressing     Problem: METABOLIC, FLUID AND ELECTROLYTES - ADULT  Goal: Electrolytes maintained within normal limits  Description: INTERVENTIONS:  - Monitor labs and assess patient for signs and symptoms of electrolyte imbalances  - Administer electrolyte replacement as ordered  - Monitor response to electrolyte replacements, including repeat lab results as appropriate  - Instruct patient on fluid and nutrition as appropriate  Outcome: Progressing  Goal: Fluid balance maintained  Description: INTERVENTIONS:  - Monitor labs   - Monitor I/O and WT  - Instruct patient on fluid and nutrition as appropriate  - Assess for signs & symptoms of volume excess or deficit  Outcome: Progressing     Problem: SKIN/TISSUE INTEGRITY - ADULT  Goal: Skin Integrity remains intact(Skin Breakdown Prevention)  Description: Assess:  -Perform Erickson assessment every shift  -Clean and moisturize skin every day  -Inspect skin when repositioning, toileting, and assisting with ADLS  -Assess extremities for adequate circulation and sensation     Bed Management:  -Have minimal linens on bed & keep smooth, unwrinkled  -Change linens as needed when moist or perspiring  -Avoid sitting or lying in one position for more than 2 hours while in bed  -Keep HOB at 45 degrees     Toileting:  -Offer bedside commode  -Assess for incontinence every 2 hours  -Use incontinent care products after each incontinent episode such as brief    Activity:  -Mobilize patient 3-4 times a day  -Encourage activity and walks on unit  -Encourage or provide ROM exercises   -Turn and reposition patient every 2 Hours  -Use appropriate equipment to lift or move patient in bed  -Instruct/ Assist with weight shifting every 2 when out of bed in chair  -Consider limitation of chair time 2 hour intervals    Skin Care:  -Avoid use of baby powder, tape, friction and shearing, hot water or constrictive clothing  -Relieve pressure over bony prominences using pillows  -Do not massage red bony areas    Next Steps:  -Teach patient strategies to minimize risks such as walker  -Consider consults to  interdisciplinary teams such as wound  Outcome: Progressing  Goal: Incision(s), wounds(s) or drain site(s) healing without S/S of infection  Description: INTERVENTIONS  - Assess and document dressing, incision, wound bed, drain sites and surrounding tissue  - Provide patient and family education  - Perform skin care/dressing changes every PRN  Outcome: Progressing     Problem: MUSCULOSKELETAL - ADULT  Goal: Maintain or return mobility to safest level of function  Description: INTERVENTIONS:  - Assess patient's ability to carry out ADLs; assess patient's baseline for ADL function and identify physical deficits which impact ability to perform ADLs (bathing, care of mouth/teeth, toileting, grooming, dressing, etc )  - Assess/evaluate cause of self-care deficits   - Assess range of motion  - Assess patient's mobility  - Assess patient's need for assistive devices and provide as appropriate  - Encourage maximum independence but intervene and supervise when necessary  - Involve family in performance of ADLs  - Assess for home care needs following discharge   - Consider OT consult to assist with ADL evaluation and planning for discharge  - Provide patient education as appropriate  Outcome: Progressing

## 2022-10-25 NOTE — PROGRESS NOTES
[address] [facility]  Progress Note - Tracee Mcclelland 1970, 45 y o  female MRN: [id number]  Unit/Bed#: [facility] 2 -01 Encounter: [id number]  Primary Care Provider: Tete Byrne MD   Date and time admitted to hospital: 9/19/2022  8:04 AM    * Toxic metabolic encephalopathy  Assessment & Plan  Due to multiple metabolic derangements, home medications morphine and gabapentin in the setting of acute kidney injury  Overall from admission she has improved but is still confused  Patient has been evaluated by Neurology, Gerhard Leonard during prolonged hospital stay  MRI performed 10/14/2022 with development of severe periventricular and white matter hyperintensities without mass effect enhancement or hemorrhage correlation with toxic encephalopathy  Recommendation for 3 month follow-up to ensure resolution  Patient completed antibiotic course  Medically stable for discharge to inpatient rehab          Pulmonary edema  Assessment & Plan  Patient with flash pulmonary edema overnight in the setting of aggressive IV fluid as well as renal dysfunction  Continue Bumex 2 mg twice a day  Evidence of bilateral pleural effusion  CT of the chest ordered  Echocardiogram with preserved ejection fraction  High risk situation    Bacteremia due to methicillin susceptible Staphylococcus aureus (MSSA)  Assessment & Plan  MSSA bacteremia, repeat cultures neg for greater than 72 hours  Echo with no vegetation  Id following  On IV ancef through 10/25    Traumatic rhabdomyolysis ([address] )  Assessment & Plan  · Secondary to fall, patient had been laying in bed for approximately 4 days  · She was subsequently brought to the hospital and found to be in acute rhabdomyolysis with ANUJA  · Had been on multiple sedating medications including MS Contin as well as gabapentin, which likely were contributing to sedation  · Now resolved    Transaminitis  Assessment & Plan  · Secondary to rhabdomyolysis    No evidence of liver injury on CT imaging  · Seen by GI  Hepatic duplex also negative  ARF (acute renal failure) (HCC)  Assessment & Plan  · ANUJA/ATN secondary to rhabdomyolysis  No evidence of renal obstruction on imaging    Kidney function has returned to normal      DDD (degenerative disc disease), lumbosacral  Assessment & Plan  · Lumbar radiculopathy with previously scheduled surgery now on hold  · Prior to admission was on gabapentin and morphine IR 15 mg  · Gabapentin resumed at lower dose      Tobacco abuse  Assessment & Plan  · Nicotine patch ordered    Depression  Assessment & Plan  · Does have history of suicide attempt a currently does not show any signs of thoughts of self-harm  · Psychiatry consultation appreciated  · Continue current medications      VTE Pharmacologic Prophylaxis:  Heparin    Patient Centered Rounds:  Patient care rounds were performed with nursing    Time Spent for Care: 30  More than 50% of total time spent on counseling and coordination of care as described above  Current Length of Stay: 36 day(s)    Current Patient Status: Inpatient   Certification Statement: The patient will continue to require additional inpatient hospital stay due to awaiting placement    Discharge Plan:  Awaiting placement    Code Status: Level 1 - Full Code      Subjective:   Patient seen and evaluated at bedside  No overnight events  Objective:     Vitals:   Temp (24hrs), Av 2 °F (36 8 °C), Min:97 9 °F (36 6 °C), Max:98 6 °F (37 °C)    Temp:  [97 9 °F (36 6 °C)-98 6 °F (37 °C)] 98 °F (36 7 °C)  HR:  [] 96  Resp:  [18] 18  BP: (129-131)/(84-86) 130/84  SpO2:  [99 %] 99 %  Body mass index is 18 4 kg/m²  Input and Output Summary (last 24 hours):     No intake or output data in the 24 hours ending 10/25/22 1603    Physical Exam:     Physical Exam  Vitals reviewed  Constitutional:       General: She is not in acute distress  Appearance: She is well-developed   She is not ill-appearing, toxic-appearing or diaphoretic  HENT:      Head: Normocephalic and atraumatic  Mouth/Throat:      Mouth: Mucous membranes are moist    Eyes:      General: No scleral icterus  Extraocular Movements: Extraocular movements intact  Cardiovascular:      Rate and Rhythm: Normal rate and regular rhythm  Heart sounds: Normal heart sounds  Pulmonary:      Effort: Pulmonary effort is normal  No respiratory distress  Breath sounds: Normal breath sounds  No wheezing or rales  Abdominal:      General: There is no distension  Palpations: Abdomen is soft  Tenderness: There is no abdominal tenderness  There is no guarding or rebound  Musculoskeletal:         General: No swelling, tenderness or deformity  Skin:     General: Skin is warm and dry  Neurological:      General: No focal deficit present  Mental Status: She is alert  She is disoriented  Additional Data:     Labs:  I have reviewed pertinent results     Results from last 7 days   Lab Units 10/24/22  0530   WBC Thousand/uL 8 87   HEMOGLOBIN g/dL 11 7   HEMATOCRIT % 37 8   PLATELETS Thousands/uL 310   NEUTROS PCT % 66   LYMPHS PCT % 26   MONOS PCT % 5   EOS PCT % 2     Results from last 7 days   Lab Units 10/24/22  0530   SODIUM mmol/L 135   POTASSIUM mmol/L 4 1   CHLORIDE mmol/L 100   CO2 mmol/L 25   BUN mg/dL 15   CREATININE mg/dL 0 94   ANION GAP mmol/L 10   CALCIUM mg/dL 9 5   GLUCOSE RANDOM mg/dL 107                         Imaging: I have reviewed pertinent imaging       Recent Cultures (last 7 days):           Last 24 Hours Medication List:   Current Facility-Administered Medications   Medication Dose Route Frequency Provider Last Rate   • acetaminophen  650 mg Oral Q6H PRN Jesus Giles DO     • ALPRAZolam  0 5 mg Oral HS PRN Josse Ellis MD     • amLODIPine  10 mg Oral Daily Selena Apple MD     • cefazolin  2,000 mg Intravenous [facility], CRNP 2,000 mg (10/25/22 1347)   • gabapentin  300 mg Oral TID Jason Bhakta Maggie Young MD     • heparin (porcine)  5,000 Units Subcutaneous [facility] Atul Adan, DO     • levalbuterol  1 25 mg Nebulization Q4H PRN Silvia Hughes, DO     • loperamide  2 mg Oral 4x Daily PRN Yasemin Owens MD     • metoprolol tartrate  25 mg Oral Q12H [facility] Inder Springer MD     • VANDANA ANTIFUNGAL   Topical BID Silvia Hughes, DO     • nicotine  1 patch Transdermal Daily Atul Adan, DO     • OLANZapine  5 mg Oral Q6H PRN Silvia Hughes, DO     • ondansetron  4 mg Intravenous Q4H PRN Blossom Rivera, DO     • QUEtiapine  50 mg Oral BID Yasemin Owens MD     • saccharomyces boulardii  250 mg Oral BID Yasemin Owens MD     • venlafaxine  187 5 mg Oral Daily Jordan Duarte DO          Today, Patient Was Seen By: Kandi Saini    ** Please Note: Dictation voice to text software may have been used in the creation of this document   **

## 2022-10-25 NOTE — PROGRESS NOTES
Progress Note - Infectious Disease   Daniel Dates 45 y o  female MRN: [id number]  Unit/Bed#: [place] [address] -01 Encounter: [id number]    Impression/Plan:  1  MSSA bacteremia  Patient was found down for prolonged period of time with evidence of multiple wounds on admission  Suspect cutaneous vs endovascular source, likely phlebitis as there is report of RUE erythema surrounding prior IV in addition to palpable cord in left antecub  TTE negative for obvious vegetation  CHERYLE was planned but then cancelled due to tenuous respiratory status  Repeat TTE still without good visualization of aortic valve  The patient is currently receiving IV cefazolin which she is tolerating without difficulty  She will complete 4 weeks of IV antibiotic today   -complete IV cefazolin today, 10/25/2022, for 4 weeks of antibiotic treatment after cleared blood cultures  -monitor CBCD and BMP  -monitor vitals  -patient will need surveillance blood cultures 2 weeks after finishing IV antibiotic treatment, to be drawn 11/8/2022     2  Toxic Metabolic encephalopathy   Likely multifactorial etiology with uremia playing a role  This was initially thought to be secondary to gabapentin and morphine use in the setting of renal failure, hypotension, rhabdomyolysis   MRI brain negative   Lumbar puncture with opening pressure 26  Negative ME panel  CSF fluid culture not collected   The most likely to be secondary to metabolic issues, medication effect, and possible Wernicke's encephalopathy  Psychiatric issues also likely playing a role   Neurology has reassessed patient  Mental status remains altered but she is more interactive today  She is being considered for [facility] brain injury unit   -serial neuro exams  -treat metabolic issues and psychiatric issues  -continue follow up with behavioral health  -continue follow up with neurology   -no additional ID workup needed     3  Acute renal failure   Creatinine on admission 5 88 with CK over 32,000  Creatinine reached plateau, now down trending   Likely multifactorial etiology in the setting of rhabdomyolysis and dehydration  Likely ANUJA/ATN    Most recent creatinine was improved to 0 94   -monitor creatinine  -dose adjust antibiotics for renal function as needed  -avoid nephrotoxins  -continue follow up with nephrology     4  Tobacco abuse   Encourage cessation as recommended by primary team   -NRT per primary service     5  Antibiotic allergy  Reports hives with penicillin  Patient has been tolerating cephalosporin without difficulty  -monitor patient for adverse medication reactions    Patient is stable for discharge from ID standpoint once she completes her final dose of IV cefazolin later today  Above plan was discussed in detail with patient at the bedside  Above plan was discussed in detail with SLIM  Antibiotics:  Cefazolin 28  Antibiotics 29    Subjective:  Patient more alert again today  1:1 now sitting in doorway instead of bedside  Patient tells me she's "good" today  Responses still a bit delayed but she answers all my questions  She denies chills, sweats, shakes, nausea, vomiting, abdominal pain, diarrhea, dysuria, cough, shortness of breath, and chest pain  No new symptoms  Objective:  Vitals:  Temp:  [97 9 °F (36 6 °C)-98 6 °F (37 °C)] 97 9 °F (36 6 °C)  HR:  [102-117] 102  Resp:  [17-18] 18  BP: (126-131)/(82-86) 131/86  SpO2:  [97 %-99 %] 99 %  Temp (24hrs), Av 1 °F (36 7 °C), Min:97 9 °F (36 6 °C), Max:98 6 °F (37 °C)  Current: Temperature: 97 9 °F (36 6 °C)    Physical Exam:   General Appearance:  Alert, interactive and less confused, nontoxic, in no acute distress  She appears comfortable sitting up in bed having breakfast    Throat: Oropharynx moist without lesions  Lungs:   Clear to auscultation bilaterally; no wheezes, rhonchi or rales; respirations unlabored on room air  Heart:  Tachycardic; no murmur, rub or gallop     Abdomen:   Soft, non-tender, non-distended, positive bowel sounds  Extremities: No clubbing or cyanosis, no edema  Skin: No new rashes noted on exposed skin       Labs, Imaging, & Other studies:   All pertinent labs and imaging studies were personally reviewed  Results from last 7 days   Lab Units 10/24/22  0530 10/19/22  0445   WBC Thousand/uL 8 87 10 01   HEMOGLOBIN g/dL 11 7 10 7*   PLATELETS Thousands/uL 310 354     Results from last 7 days   Lab Units 10/24/22  0530   POTASSIUM mmol/L 4 1   CHLORIDE mmol/L 100   CO2 mmol/L 25   BUN mg/dL 15   CREATININE mg/dL 0 94   EGFR ml/min/1 73sq m 68   CALCIUM mg/dL 9 5

## 2022-10-25 NOTE — MALNUTRITION/BMI
This medical record reflects one or more clinical indicators suggestive of malnutrition and/or morbid obesity  Malnutrition Findings:   Adult Malnutrition type: Acute illness  Adult Degree of Malnutrition: Malnutrition of moderate degree  Malnutrition Characteristics: Fat loss, Muscle loss, Inadequate energy, Weight loss                  360 Statement: Acute moderate pro, chelsey malnutrition d/t condition as evidence by mild muscle and fat loss at temples, orbitals, triceps, <75% energy intake > 7 days, 18 1% wt loss x 1 month, BMI 18 4, currently treated with liberalized oral diet, tried supplements, but PT refused, will continue to monitor for food preferences and additional snacks in between meals  BMI Findings:  Adult BMI Classifications: Underweight < 18 5        Body mass index is 18 4 kg/m²  See Nutrition note dated 10/25/22 for additional details  Completed nutrition assessment is viewable in the nutrition documentation

## 2022-10-25 NOTE — ASSESSMENT & PLAN NOTE
Due to multiple metabolic derangements, home medications morphine and gabapentin in the setting of acute kidney injury  Overall from admission she has improved but is still confused  Patient has been evaluated by Neurology, Behavioral Health during prolonged hospital stay  MRI performed 10/14/2022 with development of severe periventricular and white matter hyperintensities without mass effect enhancement or hemorrhage correlation with toxic encephalopathy  Recommendation for 3 month follow-up to ensure resolution    Patient completed antibiotic course  Medically stable for discharge to inpatient rehab

## 2022-10-25 NOTE — ASSESSMENT & PLAN NOTE
· Lumbar radiculopathy with previously scheduled surgery now on hold  · Prior to admission was on gabapentin and morphine IR 15 mg  · Gabapentin resumed at lower dose

## 2022-10-26 LAB
ANION GAP SERPL CALCULATED.3IONS-SCNC: 11 MMOL/L (ref 4–13)
BUN SERPL-MCNC: 12 MG/DL (ref 5–25)
CALCIUM SERPL-MCNC: 9.1 MG/DL (ref 8.3–10.1)
CHLORIDE SERPL-SCNC: 104 MMOL/L (ref 96–108)
CO2 SERPL-SCNC: 25 MMOL/L (ref 21–32)
CREAT SERPL-MCNC: 0.75 MG/DL (ref 0.6–1.3)
ERYTHROCYTE [DISTWIDTH] IN BLOOD BY AUTOMATED COUNT: 13.4 % (ref 11.6–15.1)
GFR SERPL CREATININE-BSD FRML MDRD: 90 ML/MIN/1.73SQ M
GLUCOSE SERPL-MCNC: 138 MG/DL (ref 65–140)
HCT VFR BLD AUTO: 33.8 % (ref 34.8–46.1)
HGB BLD-MCNC: 10.5 G/DL (ref 11.5–15.4)
MCH RBC QN AUTO: 31.7 PG (ref 26.8–34.3)
MCHC RBC AUTO-ENTMCNC: 31.1 G/DL (ref 31.4–37.4)
MCV RBC AUTO: 102 FL (ref 82–98)
PLATELET # BLD AUTO: 229 THOUSANDS/UL (ref 149–390)
PMV BLD AUTO: 10.8 FL (ref 8.9–12.7)
POTASSIUM SERPL-SCNC: 3.5 MMOL/L (ref 3.5–5.3)
RBC # BLD AUTO: 3.31 MILLION/UL (ref 3.81–5.12)
SODIUM SERPL-SCNC: 140 MMOL/L (ref 135–147)
WBC # BLD AUTO: 6.37 THOUSAND/UL (ref 4.31–10.16)

## 2022-10-26 RX ADMIN — GABAPENTIN 300 MG: 300 CAPSULE ORAL at 17:12

## 2022-10-26 RX ADMIN — Medication 250 MG: at 09:08

## 2022-10-26 RX ADMIN — AMLODIPINE BESYLATE 10 MG: 10 TABLET ORAL at 09:08

## 2022-10-26 RX ADMIN — Medication 250 MG: at 17:12

## 2022-10-26 RX ADMIN — ACETAMINOPHEN 650 MG: 325 TABLET, FILM COATED ORAL at 22:20

## 2022-10-26 RX ADMIN — QUETIAPINE FUMARATE 50 MG: 25 TABLET ORAL at 17:12

## 2022-10-26 RX ADMIN — HEPARIN SODIUM 5000 UNITS: 5000 INJECTION INTRAVENOUS; SUBCUTANEOUS at 14:43

## 2022-10-26 RX ADMIN — HEPARIN SODIUM 5000 UNITS: 5000 INJECTION INTRAVENOUS; SUBCUTANEOUS at 05:24

## 2022-10-26 RX ADMIN — VENLAFAXINE HYDROCHLORIDE 187.5 MG: 37.5 CAPSULE, EXTENDED RELEASE ORAL at 11:29

## 2022-10-26 RX ADMIN — Medication 1 PATCH: at 09:13

## 2022-10-26 RX ADMIN — ACETAMINOPHEN 650 MG: 325 TABLET, FILM COATED ORAL at 03:42

## 2022-10-26 RX ADMIN — MICONAZOLE NITRATE: 20 CREAM TOPICAL at 09:09

## 2022-10-26 RX ADMIN — METOPROLOL TARTRATE 25 MG: 25 TABLET, FILM COATED ORAL at 09:08

## 2022-10-26 RX ADMIN — QUETIAPINE FUMARATE 50 MG: 25 TABLET ORAL at 09:08

## 2022-10-26 RX ADMIN — HEPARIN SODIUM 5000 UNITS: 5000 INJECTION INTRAVENOUS; SUBCUTANEOUS at 22:15

## 2022-10-26 RX ADMIN — GABAPENTIN 300 MG: 300 CAPSULE ORAL at 09:08

## 2022-10-26 RX ADMIN — ALPRAZOLAM 0.5 MG: 0.5 TABLET ORAL at 03:05

## 2022-10-26 RX ADMIN — GABAPENTIN 300 MG: 300 CAPSULE ORAL at 22:15

## 2022-10-26 RX ADMIN — MICONAZOLE NITRATE: 20 CREAM TOPICAL at 17:38

## 2022-10-26 NOTE — PROGRESS NOTES
Progress Note - Infectious Disease   Rody Lamas 45 y o  female MRN: [id number]  Unit/Bed#: [place] [facility] 2 -01 Encounter: [id number]    Impression/Plan:  1  MSSA bacteremia  Patient was found down for prolonged period of time with evidence of multiple wounds on admission  Suspect cutaneous vs endovascular source, likely phlebitis as there is report of RUE erythema surrounding prior IV in addition to palpable cord in left antecub  TTE negative for obvious vegetation  CHERYLE was planned but then cancelled due to tenuous respiratory status  Repeat TTE still without good visualization of aortic valve  Patient completed 4 full weeks of IV Cefazolin treatment  No indication for ongoing antibiotics at this time   -monitor patient off antibiotics  -monitor CBCD and BMP  -monitor vitals  -patient will need surveillance blood cultures 2 weeks after finishing IV antibiotic treatment, to be drawn 11/8/2022     2  Toxic Metabolic encephalopathy   Likely multifactorial etiology with uremia playing a role  This was initially thought to be secondary to gabapentin and morphine use in the setting of renal failure, hypotension, rhabdomyolysis   MRI brain negative   Lumbar puncture with opening pressure 26  Negative ME panel  CSF fluid culture not collected   The most likely to be secondary to metabolic issues, medication effect, and possible Wernicke's encephalopathy  Psychiatric issues also likely playing a role   Neurology has reassessed patient  Mental status remains altered but she is more interactive today  She is being considered for [facility] brain injury unit   -serial neuro exams  -treat metabolic issues and psychiatric issues  -continue follow up with behavioral health  -continue follow up with neurology   -no additional ID workup needed     3  Acute renal failure   Creatinine on admission 5 88 with CK over 32,000   Creatinine reached plateau, now down trending   Likely multifactorial etiology in the setting of rhabdomyolysis and dehydration  Likely ANUJA/ATN    Most recent creatinine was improved to 0 94   -monitor creatinine  -dose adjust antibiotics for renal function as needed  -avoid nephrotoxins  -continue follow up with nephrology     4  Tobacco abuse   Encourage cessation as recommended by primary team   -NRT per primary service     5  Antibiotic allergy  Reports hives with penicillin  Patient tolerated cephalosporin during this hospitalization without difficulty  -monitor patient for adverse medication reactions     6  Diarrhea  Per staff she started with diarrhea late yesterday afternoon  Now already with 2 watery stools this morning  Patient denies abdominal pain but feels like she is going to have a bowel movement again soon  Will check c diff PCR with next watery stool   -check c diff PCR with next watery stool  -serial abdominal exams  -monitor GI symptoms  -monitor stool ouput    Patient is stable for discharge from ID standpoint  Above plan was discussed in detail with patient at the bedside  Above plan was discussed in detail with SLIM  Antibiotics:  No current antibiotic use  Subjective:  Patient reports she's having a lot of diarrhea  She's not having any nausea, vomiting, or abdominal pain, but tells me she thinks she's going to have to go again soon  Staff note she ate her breakfast without difficulty today  She has no fever, chills, sweats, shakes; no cough, shortness of breath, or chest pain  No new symptoms  Objective:  Vitals:  Temp:  [97 9 °F (36 6 °C)-98 2 °F (36 8 °C)] 98 2 °F (36 8 °C)  HR:  [] 103  Resp:  [18-20] 20  BP: (130-132)/(84-86) 132/85  SpO2:  [91 %-99 %] 91 %  Temp (24hrs), Av °F (36 7 °C), Min:97 9 °F (36 6 °C), Max:98 2 °F (36 8 °C)  Current: Temperature: 98 2 °F (36 8 °C)    Physical Exam:   General Appearance:  Alert, interactive with less confusion, nontoxic, no acute distress  She appears comfortable sitting up in bed     Throat: Oropharynx moist without lesions  Lungs:   Clear to auscultation bilaterally; no wheezes, rhonchi or rales; respirations unlabored on room air  Heart:  RRR; no murmur, rub or gallop  Abdomen:   Soft, non-tender, non-distended, positive bowel sounds  Extremities: No clubbing or cyanosis, no edema  Skin: No new rashes, lesions, or draining wounds noted on exposed skin       Labs, Imaging, & Other studies:   All pertinent labs and imaging studies were personally reviewed  Results from last 7 days   Lab Units 10/24/22  0530   WBC Thousand/uL 8 87   HEMOGLOBIN g/dL 11 7   PLATELETS Thousands/uL 310     Results from last 7 days   Lab Units 10/24/22  0530   POTASSIUM mmol/L 4 1   CHLORIDE mmol/L 100   CO2 mmol/L 25   BUN mg/dL 15   CREATININE mg/dL 0 94   EGFR ml/min/1 73sq m 68   CALCIUM mg/dL 9 5

## 2022-10-26 NOTE — ASSESSMENT & PLAN NOTE
Reports having multiple loose bowel movements  C diff negative  Recheck by ID  Continue florastor, immodium prn

## 2022-10-26 NOTE — PLAN OF CARE
Problem: PAIN - ADULT  Goal: Verbalizes/displays adequate comfort level or baseline comfort level  Description: Interventions:  - Encourage patient to monitor pain and request assistance  - Assess pain using appropriate pain scale  - Administer analgesics based on type and severity of pain and evaluate response  - Implement non-pharmacological measures as appropriate and evaluate response  - Consider cultural and social influences on pain and pain management  - Notify physician/advanced practitioner if interventions unsuccessful or patient reports new pain  Outcome: Progressing     Problem: INFECTION - ADULT  Goal: Absence or prevention of progression during hospitalization  Description: INTERVENTIONS:  - Assess and monitor for signs and symptoms of infection  - Monitor lab/diagnostic results  - Monitor all insertion sites, i e  indwelling lines, tubes, and drains  - Monitor endotracheal if appropriate and nasal secretions for changes in amount and color  - [place] appropriate cooling/warming therapies per order  - Administer medications as ordered  - Instruct and encourage patient and family to use good hand hygiene technique  - Identify and instruct in appropriate isolation precautions for identified infection/condition  Outcome: Progressing     Problem: SAFETY ADULT  Goal: Patient will remain free of falls  Description: INTERVENTIONS:  - Educate patient/family on patient safety including physical limitations  - Instruct patient to call for assistance with activity   - Consult OT/PT to assist with strengthening/mobility   - Keep Call bell within reach  - Keep bed low and locked with side rails adjusted as appropriate  - Keep care items and personal belongings within reach  - Initiate and maintain comfort rounds  - Make Fall Risk Sign visible to staff  - Offer Toileting every 2 Hours, in advance of need  - Initiate/Maintain bed alarm  - Obtain necessary fall risk management equipment: walker  - Apply yellow socks and bracelet for high fall risk patients  - Consider moving patient to room near nurses station  Outcome: Progressing  Goal: Maintain or return to baseline ADL function  Description: INTERVENTIONS:  -  Assess patient's ability to carry out ADLs; assess patient's baseline for ADL function and identify physical deficits which impact ability to perform ADLs (bathing, care of mouth/teeth, toileting, grooming, dressing, etc )  - Assess/evaluate cause of self-care deficits   - Assess range of motion  - Assess patient's mobility; develop plan if impaired  - Assess patient's need for assistive devices and provide as appropriate  - Encourage maximum independence but intervene and supervise when necessary  - Involve family in performance of ADLs  - Assess for home care needs following discharge   - Consider OT consult to assist with ADL evaluation and planning for discharge  - Provide patient education as appropriate  Outcome: Progressing  Goal: Maintains/Returns to pre admission functional level  Description: INTERVENTIONS:  - Perform BMAT or MOVE assessment daily    - Set and communicate daily mobility goal to care team and patient/family/caregiver  - Collaborate with rehabilitation services on mobility goals if consulted  - Perform Range of Motion 4-6 times a day  - Reposition patient every 2 hours    - Dangle patient 3-4 times a day  - Stand patient 3 times a day  - Ambulate patient 3-4 times a day  - Out of bed for toileting  - Record patient progress and toleration of activity level   Outcome: Progressing     Problem: DISCHARGE PLANNING  Goal: Discharge to home or other facility with appropriate resources  Description: INTERVENTIONS:  - Identify barriers to discharge w/patient and caregiver  - Arrange for needed discharge resources and transportation as appropriate  - Identify discharge learning needs (meds, wound care, etc )  - Arrange for interpretive services to assist at discharge as needed  - Refer to Case Management Department for coordinating discharge planning if the patient needs post-hospital services based on physician/advanced practitioner order or complex needs related to functional status, cognitive ability, or social support system  Outcome: Progressing     Problem: Knowledge Deficit  Goal: Patient/family/caregiver demonstrates understanding of disease process, treatment plan, medications, and discharge instructions  Description: Complete learning assessment and assess knowledge base    Interventions:  - Provide teaching at level of understanding  - Provide teaching via preferred learning methods  Outcome: Progressing     Problem: Potential for Falls  Goal: Patient will remain free of falls  Description: INTERVENTIONS:  - Educate patient/family on patient safety including physical limitations  - Instruct patient to call for assistance with activity   - Consult OT/PT to assist with strengthening/mobility   - Keep Call bell within reach  - Keep bed low and locked with side rails adjusted as appropriate  - Keep care items and personal belongings within reach  - Initiate and maintain comfort rounds  - Make Fall Risk Sign visible to staff  - Offer Toileting every 2 Hours, in advance of need  - Initiate/Maintain bed alarm  - Obtain necessary fall risk management equipment: walker  - Apply yellow socks and bracelet for high fall risk patients  - Consider moving patient to room near nurses station  Outcome: Progressing     Problem: MOBILITY - ADULT  Goal: Maintain or return to baseline ADL function  Description: INTERVENTIONS:  -  Assess patient's ability to carry out ADLs; assess patient's baseline for ADL function and identify physical deficits which impact ability to perform ADLs (bathing, care of mouth/teeth, toileting, grooming, dressing, etc )  - Assess/evaluate cause of self-care deficits   - Assess range of motion  - Assess patient's mobility; develop plan if impaired  - Assess patient's need for assistive devices and provide as appropriate  - Encourage maximum independence but intervene and supervise when necessary  - Involve family in performance of ADLs  - Assess for home care needs following discharge   - Consider OT consult to assist with ADL evaluation and planning for discharge  - Provide patient education as appropriate  Outcome: Progressing  Goal: Maintains/Returns to pre admission functional level  Description: INTERVENTIONS:  - Perform BMAT or MOVE assessment daily    - Set and communicate daily mobility goal to care team and patient/family/caregiver  - Collaborate with rehabilitation services on mobility goals if consulted  - Perform Range of Motion 4-6 times a day  - Reposition patient every 2 hours  - Dangle patient 3-4 times a day  - Stand patient 3 times a day  - Ambulate patient 3-4 times a day  - Out of bed for toileting  - Record patient progress and toleration of activity level   Outcome: Progressing     Problem: Prexisting or High Potential for Compromised Skin Integrity  Goal: Skin integrity is maintained or improved  Description: INTERVENTIONS:  - Identify patients at risk for skin breakdown  - Assess and monitor skin integrity  - Assess and monitor nutrition and hydration status  - Monitor labs   - Assess for incontinence   - Turn and reposition patient  - Assist with mobility/ambulation  - Relieve pressure over bony prominences  - Avoid friction and shearing  - Provide appropriate hygiene as needed including keeping skin clean and dry  - Evaluate need for skin moisturizer/barrier cream  - Collaborate with interdisciplinary team   - Patient/family teaching  - Consider wound care consult   Outcome: Progressing     Problem: Nutrition/Hydration-ADULT  Goal: Nutrient/Hydration intake appropriate for improving, restoring or maintaining nutritional needs  Description: Monitor and assess patient's nutrition/hydration status for malnutrition   Collaborate with interdisciplinary team and initiate plan and interventions as ordered  Monitor patient's weight and dietary intake as ordered or per policy  Utilize nutrition screening tool and intervene as necessary  Determine patient's food preferences and provide high-protein, high-caloric foods as appropriate  INTERVENTIONS:  - Monitor oral intake, urinary output, labs, and treatment plans  - Assess nutrition and hydration status and recommend course of action  - Evaluate amount of meals eaten  - Assist patient with eating if necessary   - Allow adequate time for meals  - Recommend/ encourage appropriate diets, oral nutritional supplements, and vitamin/mineral supplements  - Order, calculate, and assess calorie counts as needed  - Recommend, monitor, and adjust tube feedings and TPN/PPN based on assessed needs  - Assess need for intravenous fluids  - Provide specific nutrition/hydration education as appropriate  - Include patient/family/caregiver in decisions related to nutrition  Outcome: Progressing     Problem: NEUROSENSORY - ADULT  Goal: Achieves maximal functionality and self care  Description: INTERVENTIONS  - Monitor swallowing and airway patency with patient fatigue and changes in neurological status  - Encourage and assist patient to increase activity and self care     - Encourage visually impaired, hearing impaired and aphasic patients to use assistive/communication devices  Outcome: Progressing  Goal: Achieves stable or improved neurological status  Description: INTERVENTIONS  - Monitor and report changes in neurological status  - Monitor vital signs such as temperature, blood pressure, glucose, and any other labs ordered   - Initiate measures to prevent increased intracranial pressure  - Monitor for seizure activity and implement precautions if appropriate      Outcome: Progressing     Problem: CARDIOVASCULAR - ADULT  Goal: Maintains optimal cardiac output and hemodynamic stability  Description: INTERVENTIONS:  - Monitor I/O, vital signs and rhythm  - Monitor for S/S and trends of decreased cardiac output  - Administer and titrate ordered vasoactive medications to optimize hemodynamic stability  - Assess quality of pulses, skin color and temperature  - Assess for signs of decreased coronary artery perfusion  - Instruct patient to report change in severity of symptoms  Outcome: Progressing     Problem: RESPIRATORY - ADULT  Goal: Achieves optimal ventilation and oxygenation  Description: INTERVENTIONS:  - Assess for changes in respiratory status  - Assess for changes in mentation and behavior  - Position to facilitate oxygenation and minimize respiratory effort  - Oxygen administered by appropriate delivery if ordered  - Initiate smoking cessation education as indicated  - Encourage broncho-pulmonary hygiene including cough, deep breathe, Incentive Spirometry  - Assess the need for suctioning and aspirate as needed  - Assess and instruct to report SOB or any respiratory difficulty  - Respiratory Therapy support as indicated  Outcome: Progressing     Problem: GASTROINTESTINAL - ADULT  Goal: Maintains adequate nutritional intake  Description: INTERVENTIONS:  - Monitor percentage of each meal consumed  - Identify factors contributing to decreased intake, treat as appropriate  - Assist with meals as needed  - Monitor I&O, weight, and lab values if indicated  - Obtain nutrition services referral as needed  Outcome: Progressing     Problem: METABOLIC, FLUID AND ELECTROLYTES - ADULT  Goal: Electrolytes maintained within normal limits  Description: INTERVENTIONS:  - Monitor labs and assess patient for signs and symptoms of electrolyte imbalances  - Administer electrolyte replacement as ordered  - Monitor response to electrolyte replacements, including repeat lab results as appropriate  - Instruct patient on fluid and nutrition as appropriate  Outcome: Progressing  Goal: Fluid balance maintained  Description: INTERVENTIONS:  - Monitor labs   - Monitor I/O and WT  - Instruct patient on fluid and nutrition as appropriate  - Assess for signs & symptoms of volume excess or deficit  Outcome: Progressing     Problem: SKIN/TISSUE INTEGRITY - ADULT  Goal: Skin Integrity remains intact(Skin Breakdown Prevention)  Description: Assess:  -Perform Erickson assessment every shift  -Clean and moisturize skin every day  -Inspect skin when repositioning, toileting, and assisting with ADLS  -Assess extremities for adequate circulation and sensation     Bed Management:  -Have minimal linens on bed & keep smooth, unwrinkled  -Change linens as needed when moist or perspiring  -Avoid sitting or lying in one position for more than 2 hours while in bed  -Keep HOB at 45 degrees     Toileting:  -Offer bedside commode  -Assess for incontinence every 2 hours  -Use incontinent care products after each incontinent episode such as brief    Activity:  -Mobilize patient 3-4 times a day  -Encourage activity and walks on unit  -Encourage or provide ROM exercises   -Turn and reposition patient every 2 Hours  -Use appropriate equipment to lift or move patient in bed  -Instruct/ Assist with weight shifting every 2 when out of bed in chair  -Consider limitation of chair time 2 hour intervals    Skin Care:  -Avoid use of baby powder, tape, friction and shearing, hot water or constrictive clothing  -Relieve pressure over bony prominences using pillows  -Do not massage red bony areas    Next Steps:  -Teach patient strategies to minimize risks such as walker  -Consider consults to  interdisciplinary teams such as wound  Outcome: Progressing  Goal: Incision(s), wounds(s) or drain site(s) healing without S/S of infection  Description: INTERVENTIONS  - Assess and document dressing, incision, wound bed, drain sites and surrounding tissue  - Provide patient and family education  - Perform skin care/dressing changes every PRN  Outcome: Progressing     Problem: MUSCULOSKELETAL - ADULT  Goal: Maintain or return mobility to safest level of function  Description: INTERVENTIONS:  - Assess patient's ability to carry out ADLs; assess patient's baseline for ADL function and identify physical deficits which impact ability to perform ADLs (bathing, care of mouth/teeth, toileting, grooming, dressing, etc )  - Assess/evaluate cause of self-care deficits   - Assess range of motion  - Assess patient's mobility  - Assess patient's need for assistive devices and provide as appropriate  - Encourage maximum independence but intervene and supervise when necessary  - Involve family in performance of ADLs  - Assess for home care needs following discharge   - Consider OT consult to assist with ADL evaluation and planning for discharge  - Provide patient education as appropriate  Outcome: Progressing

## 2022-10-26 NOTE — ASSESSMENT & PLAN NOTE
MSSA bacteremia, repeat cultures neg for greater than 72 hours  Echo with no vegetation  Id following  Completed abx 10/26/22   Needs blood cultures in 1 week

## 2022-10-26 NOTE — PLAN OF CARE
Problem: PAIN - ADULT  Goal: Verbalizes/displays adequate comfort level or baseline comfort level  Description: Interventions:  - Encourage patient to monitor pain and request assistance  - Assess pain using appropriate pain scale  - Administer analgesics based on type and severity of pain and evaluate response  - Implement non-pharmacological measures as appropriate and evaluate response  - Consider cultural and social influences on pain and pain management  - Notify physician/advanced practitioner if interventions unsuccessful or patient reports new pain  Outcome: Progressing     Problem: INFECTION - ADULT  Goal: Absence or prevention of progression during hospitalization  Description: INTERVENTIONS:  - Assess and monitor for signs and symptoms of infection  - Monitor lab/diagnostic results  - Monitor all insertion sites, i e  indwelling lines, tubes, and drains  - Monitor endotracheal if appropriate and nasal secretions for changes in amount and color  - [place] appropriate cooling/warming therapies per order  - Administer medications as ordered  - Instruct and encourage patient and family to use good hand hygiene technique  - Identify and instruct in appropriate isolation precautions for identified infection/condition  Outcome: Progressing     Problem: SAFETY ADULT  Goal: Patient will remain free of falls  Description: INTERVENTIONS:  - Educate patient/family on patient safety including physical limitations  - Instruct patient to call for assistance with activity   - Consult OT/PT to assist with strengthening/mobility   - Keep Call bell within reach  - Keep bed low and locked with side rails adjusted as appropriate  - Keep care items and personal belongings within reach  - Initiate and maintain comfort rounds  - Make Fall Risk Sign visible to staff  - Offer Toileting every 2 Hours, in advance of need  - Initiate/Maintain bed alarm  - Obtain necessary fall risk management equipment: walker  - Apply yellow socks and bracelet for high fall risk patients  - Consider moving patient to room near nurses station  Outcome: Progressing  Goal: Maintain or return to baseline ADL function  Description: INTERVENTIONS:  -  Assess patient's ability to carry out ADLs; assess patient's baseline for ADL function and identify physical deficits which impact ability to perform ADLs (bathing, care of mouth/teeth, toileting, grooming, dressing, etc )  - Assess/evaluate cause of self-care deficits   - Assess range of motion  - Assess patient's mobility; develop plan if impaired  - Assess patient's need for assistive devices and provide as appropriate  - Encourage maximum independence but intervene and supervise when necessary  - Involve family in performance of ADLs  - Assess for home care needs following discharge   - Consider OT consult to assist with ADL evaluation and planning for discharge  - Provide patient education as appropriate  Outcome: Progressing  Goal: Maintains/Returns to pre admission functional level  Description: INTERVENTIONS:  - Perform BMAT or MOVE assessment daily    - Set and communicate daily mobility goal to care team and patient/family/caregiver  - Collaborate with rehabilitation services on mobility goals if consulted  - Perform Range of Motion 4-6 times a day  - Reposition patient every 2 hours    - Dangle patient 3-4 times a day  - Stand patient 3 times a day  - Ambulate patient 3-4 times a day  - Out of bed for toileting  - Record patient progress and toleration of activity level   Outcome: Progressing     Problem: DISCHARGE PLANNING  Goal: Discharge to home or other facility with appropriate resources  Description: INTERVENTIONS:  - Identify barriers to discharge w/patient and caregiver  - Arrange for needed discharge resources and transportation as appropriate  - Identify discharge learning needs (meds, wound care, etc )  - Arrange for interpretive services to assist at discharge as needed  - Refer to Case Management Department for coordinating discharge planning if the patient needs post-hospital services based on physician/advanced practitioner order or complex needs related to functional status, cognitive ability, or social support system  Outcome: Progressing     Problem: Knowledge Deficit  Goal: Patient/family/caregiver demonstrates understanding of disease process, treatment plan, medications, and discharge instructions  Description: Complete learning assessment and assess knowledge base    Interventions:  - Provide teaching at level of understanding  - Provide teaching via preferred learning methods  Outcome: Progressing     Problem: Potential for Falls  Goal: Patient will remain free of falls  Description: INTERVENTIONS:  - Educate patient/family on patient safety including physical limitations  - Instruct patient to call for assistance with activity   - Consult OT/PT to assist with strengthening/mobility   - Keep Call bell within reach  - Keep bed low and locked with side rails adjusted as appropriate  - Keep care items and personal belongings within reach  - Initiate and maintain comfort rounds  - Make Fall Risk Sign visible to staff  - Offer Toileting every 2 Hours, in advance of need  - Initiate/Maintain bed alarm  - Obtain necessary fall risk management equipment: walker  - Apply yellow socks and bracelet for high fall risk patients  - Consider moving patient to room near nurses station  Outcome: Progressing     Problem: MOBILITY - ADULT  Goal: Maintain or return to baseline ADL function  Description: INTERVENTIONS:  -  Assess patient's ability to carry out ADLs; assess patient's baseline for ADL function and identify physical deficits which impact ability to perform ADLs (bathing, care of mouth/teeth, toileting, grooming, dressing, etc )  - Assess/evaluate cause of self-care deficits   - Assess range of motion  - Assess patient's mobility; develop plan if impaired  - Assess patient's need for assistive devices and provide as appropriate  - Encourage maximum independence but intervene and supervise when necessary  - Involve family in performance of ADLs  - Assess for home care needs following discharge   - Consider OT consult to assist with ADL evaluation and planning for discharge  - Provide patient education as appropriate  Outcome: Progressing  Goal: Maintains/Returns to pre admission functional level  Description: INTERVENTIONS:  - Perform BMAT or MOVE assessment daily    - Set and communicate daily mobility goal to care team and patient/family/caregiver  - Collaborate with rehabilitation services on mobility goals if consulted  - Perform Range of Motion 4-6 times a day  - Reposition patient every 2 hours  - Dangle patient 3-4 times a day  - Stand patient 3 times a day  - Ambulate patient 3-4 times a day  - Out of bed for toileting  - Record patient progress and toleration of activity level   Outcome: Progressing     Problem: Prexisting or High Potential for Compromised Skin Integrity  Goal: Skin integrity is maintained or improved  Description: INTERVENTIONS:  - Identify patients at risk for skin breakdown  - Assess and monitor skin integrity  - Assess and monitor nutrition and hydration status  - Monitor labs   - Assess for incontinence   - Turn and reposition patient  - Assist with mobility/ambulation  - Relieve pressure over bony prominences  - Avoid friction and shearing  - Provide appropriate hygiene as needed including keeping skin clean and dry  - Evaluate need for skin moisturizer/barrier cream  - Collaborate with interdisciplinary team   - Patient/family teaching  - Consider wound care consult   Outcome: Progressing     Problem: Nutrition/Hydration-ADULT  Goal: Nutrient/Hydration intake appropriate for improving, restoring or maintaining nutritional needs  Description: Monitor and assess patient's nutrition/hydration status for malnutrition   Collaborate with interdisciplinary team and initiate plan and interventions as ordered  Monitor patient's weight and dietary intake as ordered or per policy  Utilize nutrition screening tool and intervene as necessary  Determine patient's food preferences and provide high-protein, high-caloric foods as appropriate  INTERVENTIONS:  - Monitor oral intake, urinary output, labs, and treatment plans  - Assess nutrition and hydration status and recommend course of action  - Evaluate amount of meals eaten  - Assist patient with eating if necessary   - Allow adequate time for meals  - Recommend/ encourage appropriate diets, oral nutritional supplements, and vitamin/mineral supplements  - Order, calculate, and assess calorie counts as needed  - Recommend, monitor, and adjust tube feedings and TPN/PPN based on assessed needs  - Assess need for intravenous fluids  - Provide specific nutrition/hydration education as appropriate  - Include patient/family/caregiver in decisions related to nutrition  Outcome: Progressing     Problem: NEUROSENSORY - ADULT  Goal: Achieves maximal functionality and self care  Description: INTERVENTIONS  - Monitor swallowing and airway patency with patient fatigue and changes in neurological status  - Encourage and assist patient to increase activity and self care     - Encourage visually impaired, hearing impaired and aphasic patients to use assistive/communication devices  Outcome: Progressing  Goal: Achieves stable or improved neurological status  Description: INTERVENTIONS  - Monitor and report changes in neurological status  - Monitor vital signs such as temperature, blood pressure, glucose, and any other labs ordered   - Initiate measures to prevent increased intracranial pressure  - Monitor for seizure activity and implement precautions if appropriate      Outcome: Progressing     Problem: CARDIOVASCULAR - ADULT  Goal: Maintains optimal cardiac output and hemodynamic stability  Description: INTERVENTIONS:  - Monitor I/O, vital signs and rhythm  - Monitor for S/S and trends of decreased cardiac output  - Administer and titrate ordered vasoactive medications to optimize hemodynamic stability  - Assess quality of pulses, skin color and temperature  - Assess for signs of decreased coronary artery perfusion  - Instruct patient to report change in severity of symptoms  Outcome: Progressing     Problem: RESPIRATORY - ADULT  Goal: Achieves optimal ventilation and oxygenation  Description: INTERVENTIONS:  - Assess for changes in respiratory status  - Assess for changes in mentation and behavior  - Position to facilitate oxygenation and minimize respiratory effort  - Oxygen administered by appropriate delivery if ordered  - Initiate smoking cessation education as indicated  - Encourage broncho-pulmonary hygiene including cough, deep breathe, Incentive Spirometry  - Assess the need for suctioning and aspirate as needed  - Assess and instruct to report SOB or any respiratory difficulty  - Respiratory Therapy support as indicated  Outcome: Progressing     Problem: GASTROINTESTINAL - ADULT  Goal: Maintains adequate nutritional intake  Description: INTERVENTIONS:  - Monitor percentage of each meal consumed  - Identify factors contributing to decreased intake, treat as appropriate  - Assist with meals as needed  - Monitor I&O, weight, and lab values if indicated  - Obtain nutrition services referral as needed  Outcome: Progressing     Problem: METABOLIC, FLUID AND ELECTROLYTES - ADULT  Goal: Electrolytes maintained within normal limits  Description: INTERVENTIONS:  - Monitor labs and assess patient for signs and symptoms of electrolyte imbalances  - Administer electrolyte replacement as ordered  - Monitor response to electrolyte replacements, including repeat lab results as appropriate  - Instruct patient on fluid and nutrition as appropriate  Outcome: Progressing  Goal: Fluid balance maintained  Description: INTERVENTIONS:  - Monitor labs   - Monitor I/O and WT  - Instruct patient on fluid and nutrition as appropriate  - Assess for signs & symptoms of volume excess or deficit  Outcome: Progressing     Problem: SKIN/TISSUE INTEGRITY - ADULT  Goal: Skin Integrity remains intact(Skin Breakdown Prevention)  Description: Assess:  -Perform Erickson assessment every shift  -Clean and moisturize skin every day  -Inspect skin when repositioning, toileting, and assisting with ADLS  -Assess extremities for adequate circulation and sensation     Bed Management:  -Have minimal linens on bed & keep smooth, unwrinkled  -Change linens as needed when moist or perspiring  -Avoid sitting or lying in one position for more than 2 hours while in bed  -Keep HOB at 45 degrees     Toileting:  -Offer bedside commode  -Assess for incontinence every 2 hours  -Use incontinent care products after each incontinent episode such as brief    Activity:  -Mobilize patient 3-4 times a day  -Encourage activity and walks on unit  -Encourage or provide ROM exercises   -Turn and reposition patient every 2 Hours  -Use appropriate equipment to lift or move patient in bed  -Instruct/ Assist with weight shifting every 2 when out of bed in chair  -Consider limitation of chair time 2 hour intervals    Skin Care:  -Avoid use of baby powder, tape, friction and shearing, hot water or constrictive clothing  -Relieve pressure over bony prominences using pillows  -Do not massage red bony areas    Next Steps:  -Teach patient strategies to minimize risks such as walker  -Consider consults to  interdisciplinary teams such as wound  Outcome: Progressing  Goal: Incision(s), wounds(s) or drain site(s) healing without S/S of infection  Description: INTERVENTIONS  - Assess and document dressing, incision, wound bed, drain sites and surrounding tissue  - Provide patient and family education  - Perform skin care/dressing changes every PRN  Outcome: Progressing     Problem: MUSCULOSKELETAL - ADULT  Goal: Maintain or return mobility to safest level of function  Description: INTERVENTIONS:  - Assess patient's ability to carry out ADLs; assess patient's baseline for ADL function and identify physical deficits which impact ability to perform ADLs (bathing, care of mouth/teeth, toileting, grooming, dressing, etc )  - Assess/evaluate cause of self-care deficits   - Assess range of motion  - Assess patient's mobility  - Assess patient's need for assistive devices and provide as appropriate  - Encourage maximum independence but intervene and supervise when necessary  - Involve family in performance of ADLs  - Assess for home care needs following discharge   - Consider OT consult to assist with ADL evaluation and planning for discharge  - Provide patient education as appropriate  Outcome: Progressing

## 2022-10-26 NOTE — PROGRESS NOTES
[facility]  Progress Note - Mary Overton 1970, 45 y o  female MRN: [id number]  Unit/Bed#: [place] 2 -01 Encounter: [id number]  Primary Care Provider: Lisa Goldberg MD   Date and time admitted to hospital: 9/19/2022  8:04 AM    * Toxic metabolic encephalopathy  Assessment & Plan  Due to multiple metabolic derangements, home medications morphine and gabapentin in the setting of acute kidney injury  Overall from admission she has improved but is still confused  Patient has been evaluated by Neurology, Gerhard Leonard during prolonged hospital stay  MRI performed 10/14/2022 with development of severe periventricular and white matter hyperintensities without mass effect enhancement or hemorrhage correlation with toxic encephalopathy  Recommendation for 3 month follow-up to ensure resolution  Patient completed antibiotic course  Medically stable for discharge to inpatient rehab          Diarrhea  Assessment & Plan  Reports having multiple loose bowel movements  C diff negative  Recheck by ID  Continue florastor, immodium prn    Bacteremia due to methicillin susceptible Staphylococcus aureus (MSSA)  Assessment & Plan  MSSA bacteremia, repeat cultures neg for greater than 72 hours  Echo with no vegetation  Id following  Completed abx 10/26/22   Needs blood cultures in 1 week     Traumatic rhabdomyolysis ([facility] [address] )  Assessment & Plan  · Secondary to fall, patient had been laying in bed for approximately 4 days  · She was subsequently brought to the hospital and found to be in acute rhabdomyolysis with ANUJA  · Had been on multiple sedating medications including MS Contin as well as gabapentin, which likely were contributing to sedation  · Now resolved    Transaminitis  Assessment & Plan  · Secondary to rhabdomyolysis  No evidence of liver injury on CT imaging  · Seen by GI  Hepatic duplex also negative            ARF (acute renal failure) (HCC)  Assessment & Plan  · ANUJA/ATN secondary to rhabdomyolysis  No evidence of renal obstruction on imaging    Kidney function has returned to normal      DDD (degenerative disc disease), lumbosacral  Assessment & Plan  · Lumbar radiculopathy with previously scheduled surgery now on hold  Will see if neurosurgery will like to address now that she is impatient  I may speed up her rehab potential  May be difficult due to patient's impulsive nature and fall risk  · Prior to admission was on gabapentin and morphine IR 15 mg  · Gabapentin resumed at lower dose      Tobacco abuse  Assessment & Plan  · Nicotine patch ordered    Depression  Assessment & Plan  · Does have history of suicide attempt a currently does not show any signs of thoughts of self-harm  · Psychiatry consultation appreciated  · Continue current medications    VTE Pharmacologic Prophylaxis:  Heparin    Patient Centered Rounds:  Patient care rounds were performed with nursing    Time Spent for Care: 30  More than 50% of total time spent on counseling and coordination of care as described above  Current Length of Stay: 37 day(s)    Current Patient Status: Inpatient   Certification Statement: The patient will continue to require additional inpatient hospital stay due to awaiting placement    Discharge Plan: awaiting placement     Code Status: Level 1 - Full Code      Subjective:   Patient seen examined at bedside  No overnight events  Objective:     Vitals:   Temp (24hrs), Av 1 °F (36 7 °C), Min:98 °F (36 7 °C), Max:98 2 °F (36 8 °C)    Temp:  [98 °F (36 7 °C)-98 2 °F (36 8 °C)] 98 2 °F (36 8 °C)  HR:  [] 103  Resp:  [20] 20  BP: (130-132)/(84-85) 132/85  SpO2:  [91 %-99 %] 91 %  Body mass index is 18 4 kg/m²  Input and Output Summary (last 24 hours): Intake/Output Summary (Last 24 hours) at 10/26/2022 1320  Last data filed at 10/26/2022 0333  Gross per 24 hour   Intake 460 ml   Output --   Net 460 ml       Physical Exam:     Physical Exam  Vitals reviewed     Constitutional: General: She is not in acute distress  Appearance: She is well-developed  She is not ill-appearing, toxic-appearing or diaphoretic  HENT:      Head: Normocephalic and atraumatic  Mouth/Throat:      Mouth: Mucous membranes are moist    Eyes:      General: No scleral icterus  Extraocular Movements: Extraocular movements intact  Cardiovascular:      Rate and Rhythm: Normal rate and regular rhythm  Heart sounds: Normal heart sounds  Pulmonary:      Effort: Pulmonary effort is normal  No respiratory distress  Breath sounds: Normal breath sounds  No wheezing or rales  Abdominal:      General: There is no distension  Palpations: Abdomen is soft  Tenderness: There is no abdominal tenderness  There is no guarding or rebound  Musculoskeletal:         General: No swelling, tenderness or deformity  Skin:     General: Skin is warm and dry  Neurological:      General: No focal deficit present  Mental Status: She is alert  Additional Data:     Labs:  I have reviewed pertinent results     Results from last 7 days   Lab Units 10/26/22  1049 10/24/22  0530   WBC Thousand/uL 6 37 8 87   HEMOGLOBIN g/dL 10 5* 11 7   HEMATOCRIT % 33 8* 37 8   PLATELETS Thousands/uL 229 310   NEUTROS PCT %  --  66   LYMPHS PCT %  --  26   MONOS PCT %  --  5   EOS PCT %  --  2     Results from last 7 days   Lab Units 10/26/22  1049   SODIUM mmol/L 140   POTASSIUM mmol/L 3 5   CHLORIDE mmol/L 104   CO2 mmol/L 25   BUN mg/dL 12   CREATININE mg/dL 0 75   ANION GAP mmol/L 11   CALCIUM mg/dL 9 1   GLUCOSE RANDOM mg/dL 138                         Imaging: I have reviewed pertinent imaging       Recent Cultures (last 7 days):           Last 24 Hours Medication List:   Current Facility-Administered Medications   Medication Dose Route Frequency Provider Last Rate   • acetaminophen  650 mg Oral Q6H PRN Jesus Giles DO     • ALPRAZolam  0 5 mg Oral HS PRN Josse Ellis MD     • amLODIPine  10 mg Oral Daily Patricia Street MD     • gabapentin  300 mg Oral TID Elaine Olivarez MD     • heparin (porcine)  5,000 Units Subcutaneous [facility] Karina Aly Loco, DO     • levalbuterol  1 25 mg Nebulization Q4H PRN [facility], DO     • loperamide  2 mg Oral 4x Daily PRN Jelani Singh MD     • metoprolol tartrate  25 mg Oral Q12H [address] Inder Kenny MD     • VANDANA ANTIFUNGAL   Topical BID [address] [facility], DO     • nicotine  1 patch Transdermal Daily Atul Adan, DO     • OLANZapine  5 mg Oral Q6H PRN [address] [facility], DO     • ondansetron  4 mg Intravenous Q4H PRN Brooke Smyth DO     • QUEtiapine  50 mg Oral BID Jelani Singh MD     • saccharomyces boulardii  250 mg Oral BID Jelani Singh MD     • venlafaxine  187 5 mg Oral Daily William Paget, DO          Today, Patient Was Seen By: Julia Gonzalez    ** Please Note: Dictation voice to text software may have been used in the creation of this document   **

## 2022-10-26 NOTE — PLAN OF CARE
Problem: PHYSICAL THERAPY ADULT  Goal: Performs mobility at highest level of function for planned discharge setting  See evaluation for individualized goals  Description: Treatment/Interventions: Functional transfer training, LE strengthening/ROM, Elevations, Therapeutic exercise, Cognitive reorientation, Patient/family training, Equipment eval/education, Bed mobility, Gait training, Compensatory technique education, Continued evaluation, Spoke to nursing, Spoke to case management, Spoke to MD, OT, ST  Equipment Recommended: Marla Henriquez (if patient does not own)       See flowsheet documentation for full assessment, interventions and recommendations  Outcome: Not Progressing  Note: Prognosis: Guarded  Problem List: Decreased strength, Decreased range of motion, Decreased endurance, Impaired balance, Decreased mobility, Decreased coordination, Decreased cognition, Impaired judgement, Decreased safety awareness, Pain, Decreased skin integrity, Impaired sensation  Assessment: Pt seen for PT treatment session this date with interventions consisting of supine TE BLE AAROM, and education provided for proper technique and benefit of participating in PT treatment  Pt agreeable to PT treatment session upon arrival, pt found resting in bed  At end of session, pt left in bed with bed alarm activated and all needs in reach with 1:1  In comparison to previous session, pt :with no improvements as evidenced by declination to participate in further mobility tasks   Continue to recommend STR at time of d/c in order to maximize pt's functional independence and safety w/ mobility  Pt continues to be functioning below baseline level  PT will continue to see pt while here in order to address the deficits listed above and provide interventions consistent w/ POC in effort to achieve STGs    Barriers to Discharge: Inaccessible home environment, Decreased caregiver support  Barriers to Discharge Comments: unable to identify at this time  PT Discharge Recommendation: Post acute rehabilitation services    See flowsheet documentation for full assessment

## 2022-10-26 NOTE — PLAN OF CARE
Problem: PAIN - ADULT  Goal: Verbalizes/displays adequate comfort level or baseline comfort level  Description: Interventions:  - Encourage patient to monitor pain and request assistance  - Assess pain using appropriate pain scale  - Administer analgesics based on type and severity of pain and evaluate response  - Implement non-pharmacological measures as appropriate and evaluate response  - Consider cultural and social influences on pain and pain management  - Notify physician/advanced practitioner if interventions unsuccessful or patient reports new pain  10/26/2022 1541 by Shara Kim RN  Outcome: Progressing  10/26/2022 1540 by Shara Kim RN  Outcome: Progressing     Problem: INFECTION - ADULT  Goal: Absence or prevention of progression during hospitalization  Description: INTERVENTIONS:  - Assess and monitor for signs and symptoms of infection  - Monitor lab/diagnostic results  - Monitor all insertion sites, i e  indwelling lines, tubes, and drains  - Monitor endotracheal if appropriate and nasal secretions for changes in amount and color  - [place] appropriate cooling/warming therapies per order  - Administer medications as ordered  - Instruct and encourage patient and family to use good hand hygiene technique  - Identify and instruct in appropriate isolation precautions for identified infection/condition  10/26/2022 1541 by Shara Kim RN  Outcome: Progressing  10/26/2022 1540 by Shara Kim RN  Outcome: Progressing     Problem: SAFETY ADULT  Goal: Patient will remain free of falls  Description: INTERVENTIONS:  - Educate patient/family on patient safety including physical limitations  - Instruct patient to call for assistance with activity   - Consult OT/PT to assist with strengthening/mobility   - Keep Call bell within reach  - Keep bed low and locked with side rails adjusted as appropriate  - Keep care items and personal belongings within reach  - Initiate and maintain comfort rounds  - Make Fall Risk Sign visible to staff  - Offer Toileting every 2 Hours, in advance of need  - Initiate/Maintain bed alarm  - Obtain necessary fall risk management equipment: walker  - Apply yellow socks and bracelet for high fall risk patients  - Consider moving patient to room near nurses station  10/26/2022 1541 by Owen Gallardo RN  Outcome: Progressing  10/26/2022 1540 by Owen Gallardo RN  Outcome: Progressing  Goal: Maintain or return to baseline ADL function  Description: INTERVENTIONS:  -  Assess patient's ability to carry out ADLs; assess patient's baseline for ADL function and identify physical deficits which impact ability to perform ADLs (bathing, care of mouth/teeth, toileting, grooming, dressing, etc )  - Assess/evaluate cause of self-care deficits   - Assess range of motion  - Assess patient's mobility; develop plan if impaired  - Assess patient's need for assistive devices and provide as appropriate  - Encourage maximum independence but intervene and supervise when necessary  - Involve family in performance of ADLs  - Assess for home care needs following discharge   - Consider OT consult to assist with ADL evaluation and planning for discharge  - Provide patient education as appropriate  10/26/2022 1541 by Owen Gallardo RN  Outcome: Progressing  10/26/2022 1540 by Owen Gallardo RN  Outcome: Progressing  Goal: Maintains/Returns to pre admission functional level  Description: INTERVENTIONS:  - Perform BMAT or MOVE assessment daily    - Set and communicate daily mobility goal to care team and patient/family/caregiver  - Collaborate with rehabilitation services on mobility goals if consulted  - Perform Range of Motion 4-6 times a day  - Reposition patient every 2 hours    - Dangle patient 3-4 times a day  - Stand patient 3 times a day  - Ambulate patient 3-4 times a day  - Out of bed for toileting  - Record patient progress and toleration of activity level   10/26/2022 1541 by Raudel Liao RN  Outcome: Progressing  10/26/2022 1540 by Raudel Liao RN  Outcome: Progressing     Problem: DISCHARGE PLANNING  Goal: Discharge to home or other facility with appropriate resources  Description: INTERVENTIONS:  - Identify barriers to discharge w/patient and caregiver  - Arrange for needed discharge resources and transportation as appropriate  - Identify discharge learning needs (meds, wound care, etc )  - Arrange for interpretive services to assist at discharge as needed  - Refer to Case Management Department for coordinating discharge planning if the patient needs post-hospital services based on physician/advanced practitioner order or complex needs related to functional status, cognitive ability, or social support system  10/26/2022 1541 by Raudel Liao RN  Outcome: Progressing  10/26/2022 1540 by Raudel Liao RN  Outcome: Progressing     Problem: Knowledge Deficit  Goal: Patient/family/caregiver demonstrates understanding of disease process, treatment plan, medications, and discharge instructions  Description: Complete learning assessment and assess knowledge base    Interventions:  - Provide teaching at level of understanding  - Provide teaching via preferred learning methods  10/26/2022 1541 by Raudel Liao RN  Outcome: Progressing  10/26/2022 1540 by Raudel Liao RN  Outcome: Progressing     Problem: Potential for Falls  Goal: Patient will remain free of falls  Description: INTERVENTIONS:  - Educate patient/family on patient safety including physical limitations  - Instruct patient to call for assistance with activity   - Consult OT/PT to assist with strengthening/mobility   - Keep Call bell within reach  - Keep bed low and locked with side rails adjusted as appropriate  - Keep care items and personal belongings within reach  - Initiate and maintain comfort rounds  - Make Fall Risk Sign visible to staff  - Offer Toileting every 2 Hours, in advance of need  - Initiate/Maintain bed alarm  - Obtain necessary fall risk management equipment: walker  - Apply yellow socks and bracelet for high fall risk patients  - Consider moving patient to room near nurses station  10/26/2022 1541 by Arsenio Simms RN  Outcome: Progressing  10/26/2022 1540 by Arsenio Simms RN  Outcome: Progressing     Problem: MOBILITY - ADULT  Goal: Maintain or return to baseline ADL function  Description: INTERVENTIONS:  -  Assess patient's ability to carry out ADLs; assess patient's baseline for ADL function and identify physical deficits which impact ability to perform ADLs (bathing, care of mouth/teeth, toileting, grooming, dressing, etc )  - Assess/evaluate cause of self-care deficits   - Assess range of motion  - Assess patient's mobility; develop plan if impaired  - Assess patient's need for assistive devices and provide as appropriate  - Encourage maximum independence but intervene and supervise when necessary  - Involve family in performance of ADLs  - Assess for home care needs following discharge   - Consider OT consult to assist with ADL evaluation and planning for discharge  - Provide patient education as appropriate  10/26/2022 1541 by Arsenio Simms RN  Outcome: Progressing  10/26/2022 1540 by Arsenio Simms RN  Outcome: Progressing  Goal: Maintains/Returns to pre admission functional level  Description: INTERVENTIONS:  - Perform BMAT or MOVE assessment daily    - Set and communicate daily mobility goal to care team and patient/family/caregiver  - Collaborate with rehabilitation services on mobility goals if consulted  - Perform Range of Motion 4-6 times a day  - Reposition patient every 2 hours    - Dangle patient 3-4 times a day  - Stand patient 3 times a day  - Ambulate patient 3-4 times a day  - Out of bed for toileting  - Record patient progress and toleration of activity level   10/26/2022 1541 by Arsenio Simms RN  Outcome: Progressing  10/26/2022 1540 by [email or web address] Za Travis RN  Outcome: Progressing     Problem: Prexisting or High Potential for Compromised Skin Integrity  Goal: Skin integrity is maintained or improved  Description: INTERVENTIONS:  - Identify patients at risk for skin breakdown  - Assess and monitor skin integrity  - Assess and monitor nutrition and hydration status  - Monitor labs   - Assess for incontinence   - Turn and reposition patient  - Assist with mobility/ambulation  - Relieve pressure over bony prominences  - Avoid friction and shearing  - Provide appropriate hygiene as needed including keeping skin clean and dry  - Evaluate need for skin moisturizer/barrier cream  - Collaborate with interdisciplinary team   - Patient/family teaching  - Consider wound care consult   10/26/2022 1541 by Buelah Mortimer, RN  Outcome: Progressing  10/26/2022 1540 by Buelah Mortimer, RN  Outcome: Progressing     Problem: Nutrition/Hydration-ADULT  Goal: Nutrient/Hydration intake appropriate for improving, restoring or maintaining nutritional needs  Description: Monitor and assess patient's nutrition/hydration status for malnutrition  Collaborate with interdisciplinary team and initiate plan and interventions as ordered  Monitor patient's weight and dietary intake as ordered or per policy  Utilize nutrition screening tool and intervene as necessary  Determine patient's food preferences and provide high-protein, high-caloric foods as appropriate       INTERVENTIONS:  - Monitor oral intake, urinary output, labs, and treatment plans  - Assess nutrition and hydration status and recommend course of action  - Evaluate amount of meals eaten  - Assist patient with eating if necessary   - Allow adequate time for meals  - Recommend/ encourage appropriate diets, oral nutritional supplements, and vitamin/mineral supplements  - Order, calculate, and assess calorie counts as needed  - Recommend, monitor, and adjust tube feedings and TPN/PPN based on assessed needs  - Assess need for intravenous fluids  - Provide specific nutrition/hydration education as appropriate  - Include patient/family/caregiver in decisions related to nutrition  10/26/2022 1541 by Carrington Cuellar RN  Outcome: Progressing  10/26/2022 1540 by Carrington Cuellar RN  Outcome: Progressing     Problem: NEUROSENSORY - ADULT  Goal: Achieves maximal functionality and self care  Description: INTERVENTIONS  - Monitor swallowing and airway patency with patient fatigue and changes in neurological status  - Encourage and assist patient to increase activity and self care     - Encourage visually impaired, hearing impaired and aphasic patients to use assistive/communication devices  10/26/2022 1541 by Carrington Cuellar RN  Outcome: Progressing  10/26/2022 1540 by Carrington Cuellar RN  Outcome: Progressing  Goal: Achieves stable or improved neurological status  Description: INTERVENTIONS  - Monitor and report changes in neurological status  - Monitor vital signs such as temperature, blood pressure, glucose, and any other labs ordered   - Initiate measures to prevent increased intracranial pressure  - Monitor for seizure activity and implement precautions if appropriate      10/26/2022 1541 by Carrington Cuellar RN  Outcome: Progressing  10/26/2022 1540 by Carrington Cuellar RN  Outcome: Progressing     Problem: CARDIOVASCULAR - ADULT  Goal: Maintains optimal cardiac output and hemodynamic stability  Description: INTERVENTIONS:  - Monitor I/O, vital signs and rhythm  - Monitor for S/S and trends of decreased cardiac output  - Administer and titrate ordered vasoactive medications to optimize hemodynamic stability  - Assess quality of pulses, skin color and temperature  - Assess for signs of decreased coronary artery perfusion  - Instruct patient to report change in severity of symptoms  10/26/2022 1541 by Carrington Cuellar RN  Outcome: Progressing  10/26/2022 1540 by Carrington Cuellar RN  Outcome: Progressing     Problem: RESPIRATORY - ADULT  Goal: Achieves optimal ventilation and oxygenation  Description: INTERVENTIONS:  - Assess for changes in respiratory status  - Assess for changes in mentation and behavior  - Position to facilitate oxygenation and minimize respiratory effort  - Oxygen administered by appropriate delivery if ordered  - Initiate smoking cessation education as indicated  - Encourage broncho-pulmonary hygiene including cough, deep breathe, Incentive Spirometry  - Assess the need for suctioning and aspirate as needed  - Assess and instruct to report SOB or any respiratory difficulty  - Respiratory Therapy support as indicated  10/26/2022 1541 by Ileana Rivera RN  Outcome: Progressing  10/26/2022 1540 by Ileana Rivera RN  Outcome: Progressing     Problem: GASTROINTESTINAL - ADULT  Goal: Maintains adequate nutritional intake  Description: INTERVENTIONS:  - Monitor percentage of each meal consumed  - Identify factors contributing to decreased intake, treat as appropriate  - Assist with meals as needed  - Monitor I&O, weight, and lab values if indicated  - Obtain nutrition services referral as needed  10/26/2022 1541 by Ileana Rivera RN  Outcome: Progressing  10/26/2022 1540 by Ileana Rivera RN  Outcome: Progressing     Problem: METABOLIC, FLUID AND ELECTROLYTES - ADULT  Goal: Electrolytes maintained within normal limits  Description: INTERVENTIONS:  - Monitor labs and assess patient for signs and symptoms of electrolyte imbalances  - Administer electrolyte replacement as ordered  - Monitor response to electrolyte replacements, including repeat lab results as appropriate  - Instruct patient on fluid and nutrition as appropriate  10/26/2022 1541 by Ileana Rivera RN  Outcome: Progressing  10/26/2022 1540 by Ileana Rivera RN  Outcome: Progressing  Goal: Fluid balance maintained  Description: INTERVENTIONS:  - Monitor labs   - Monitor I/O and WT  - Instruct patient on fluid and nutrition as appropriate  - Assess for signs & symptoms of volume excess or deficit  10/26/2022 1541 by Lawyer Tj RN  Outcome: Progressing  10/26/2022 1540 by Lawyer Tj RN  Outcome: Progressing     Problem: SKIN/TISSUE INTEGRITY - ADULT  Goal: Skin Integrity remains intact(Skin Breakdown Prevention)  Description: Assess:  -Perform Erickson assessment every shift  -Clean and moisturize skin every day  -Inspect skin when repositioning, toileting, and assisting with ADLS  -Assess extremities for adequate circulation and sensation     Bed Management:  -Have minimal linens on bed & keep smooth, unwrinkled  -Change linens as needed when moist or perspiring  -Avoid sitting or lying in one position for more than 2 hours while in bed  -Keep HOB at 45 degrees     Toileting:  -Offer bedside commode  -Assess for incontinence every 2 hours  -Use incontinent care products after each incontinent episode such as brief    Activity:  -Mobilize patient 3-4 times a day  -Encourage activity and walks on unit  -Encourage or provide ROM exercises   -Turn and reposition patient every 2 Hours  -Use appropriate equipment to lift or move patient in bed  -Instruct/ Assist with weight shifting every 2 when out of bed in chair  -Consider limitation of chair time 2 hour intervals    Skin Care:  -Avoid use of baby powder, tape, friction and shearing, hot water or constrictive clothing  -Relieve pressure over bony prominences using pillows  -Do not massage red bony areas    Next Steps:  -Teach patient strategies to minimize risks such as walker  -Consider consults to  interdisciplinary teams such as wound  10/26/2022 1541 by Lawyer Spencer RN  Outcome: Progressing  10/26/2022 1540 by Lawyer Tj RN  Outcome: Progressing  Goal: Incision(s), wounds(s) or drain site(s) healing without S/S of infection  Description: INTERVENTIONS  - Assess and document dressing, incision, wound bed, drain sites and surrounding tissue  - Provide patient and family education  - Perform skin care/dressing changes every PRN  10/26/2022 1541 by Maris Hodges RN  Outcome: Progressing  10/26/2022 1540 by Maris Hodges RN  Outcome: Progressing     Problem: MUSCULOSKELETAL - ADULT  Goal: Maintain or return mobility to safest level of function  Description: INTERVENTIONS:  - Assess patient's ability to carry out ADLs; assess patient's baseline for ADL function and identify physical deficits which impact ability to perform ADLs (bathing, care of mouth/teeth, toileting, grooming, dressing, etc )  - Assess/evaluate cause of self-care deficits   - Assess range of motion  - Assess patient's mobility  - Assess patient's need for assistive devices and provide as appropriate  - Encourage maximum independence but intervene and supervise when necessary  - Involve family in performance of ADLs  - Assess for home care needs following discharge   - Consider OT consult to assist with ADL evaluation and planning for discharge  - Provide patient education as appropriate  10/26/2022 1541 by Maris Hodges RN  Outcome: Progressing  10/26/2022 1540 by Maris Hodges RN  Outcome: Progressing

## 2022-10-26 NOTE — PHYSICAL THERAPY NOTE
PHYSICAL THERAPY TREATMENT NOTE  NAME:  Alejandro Benavides  DATE: 10/26/22    Length Of Stay: 37  Performed at least 2 patient identifiers during session: Name and ID bracelet    TREATMENT FLOWSHEET:    10/26/22 1457   PT Last Visit   PT Visit Date 10/26/22   Note Type   Note Type Treatment   Pain Assessment   Pain Assessment Tool 0-10   Pain Score 7   Pain Location/Orientation Orientation: Bilateral;[place] Foot   Pain Onset/Description Onset: Ongoing  (pt  reports neuropathy B feet)   Patient's Stated Pain Goal No pain   Hospital Pain Intervention(s) Repositioned; Ambulation/increased activity   Restrictions/Precautions   Weight Bearing Precautions Per Order No   Other Precautions 1:1;Bed Alarm; Chair Alarm;Cognitive; Fall Risk   General   Chart Reviewed Yes   Response to Previous Treatment Patient with no complaints from previous session  Family/Caregiver Present No   Cognition   Overall Cognitive Status Impaired   Arousal/Participation Responsive   Attention Attends with cues to redirect   Orientation Level Oriented to person;Oriented to place   Memory Decreased recall of precautions;Decreased recall of recent events;Decreased short term memory   Following Commands Follows one step commands with increased time or repetition   Comments Pt  originally declined treatment but the agreed to ther ex in bed only  Education of PT benefits provided but pt  reported her feet hurt to bad from neuropathy  Subjective   Subjective "Only bed exercise "   Bed Mobility   Additional Comments pt  declined   Transfers   Additional Comments pt  declined   Ambulation/Elevation   Ambulation/Elevation Additional Comments pt  declined   Endurance Deficit   Endurance Deficit Yes   Activity Tolerance   Activity Tolerance Patient limited by pain   Exercises   Quad Sets Supine;25 reps;AAROM; Bilateral   Heelslides Supine;25 reps;AAROM; Bilateral   Hip Flexion Supine;25 reps;AAROM; Bilateral   Hip Abduction Supine;25 reps;AAROM; Bilateral   Hip Adduction Supine;25 reps;AAROM; Bilateral   Knee AROM Short Arc Quad Supine;25 reps;AAROM; Bilateral   Knee PROM Flexion Supine;25 reps;AAROM; Bilateral   Knee PROM Extension Supine;25 reps;AAROM; Bilateral   Knee AROM Extension Supine Supine;25 reps;AAROM; Bilateral   Ankle Pumps Supine;25 reps;AAROM; Bilateral   Assessment   Prognosis Guarded   Problem List Decreased strength;Decreased range of motion;Decreased endurance; Impaired balance;Decreased mobility; Decreased coordination;Decreased cognition; Impaired judgement;Decreased safety awareness;Pain;Decreased skin integrity; Impaired sensation   Goals   Patient Goals to relax   PT Treatment Day 12   Plan   Treatment/Interventions Functional transfer training;LE strengthening/ROM; Endurance training; Therapeutic exercise;Cognitive reorientation;Patient/family training;Equipment eval/education; Bed mobility;Gait training;Spoke to nursing   Progress Slow progress, cognitive deficits   PT Frequency Other (Comment)  (2-4x/wk)   Recommendation   PT Discharge Recommendation Post acute rehabilitation services   AM-PAC Basic Mobility Inpatient   Turning in Bed Without Bedrails 3   Lying on Back to Sitting on Edge of Flat Bed 3   Moving Bed to Chair 3   Standing Up From Chair 3   Walk in Room 3   Climb 3-5 Stairs 3   Basic Mobility Inpatient Raw Score 18   Basic Mobility Standardized Score 41 05   Highest Level Of Mobility   JH-HLM Goal 6: Walk 10 steps or more   JH-HLM Achieved 2: Bed activities/Dependent transfer       The patient's AM-PAC Basic Mobility Inpatient Short Form Raw Score is 18, Standardized Score is 41 05  A standardized score less than 42 9 suggests the patient may benefit from discharge to post-acute rehabilitation services  Please also refer to the recommendation of the Physical Therapist for safe discharge planning      Pt seen for PT treatment session this date with interventions consisting of supine TE BLE AAROM, and education provided for proper technique and benefit of participating in PT treatment  Pt agreeable to PT treatment session upon arrival, pt found resting in bed  At end of session, pt left in bed with bed alarm activated and all needs in reach with 1:1  In comparison to previous session, pt :with no improvements as evidenced by declination to participate in further mobility tasks   Continue to recommend STR at time of d/c in order to maximize pt's functional independence and safety w/ mobility  Pt continues to be functioning below baseline level  PT will continue to see pt while here in order to address the deficits listed above and provide interventions consistent w/ POC in effort to achieve STGs      Martha Walker

## 2022-10-26 NOTE — ASSESSMENT & PLAN NOTE
· Lumbar radiculopathy with previously scheduled surgery now on hold  Will see if neurosurgery will like to address now that she is impatient  I may speed up her rehab potential  May be difficult due to patient's impulsive nature and fall risk     · Prior to admission was on gabapentin and morphine IR 15 mg  · Gabapentin resumed at lower dose

## 2022-10-27 RX ADMIN — Medication 250 MG: at 08:41

## 2022-10-27 RX ADMIN — MICONAZOLE NITRATE: 20 CREAM TOPICAL at 17:47

## 2022-10-27 RX ADMIN — HEPARIN SODIUM 5000 UNITS: 5000 INJECTION INTRAVENOUS; SUBCUTANEOUS at 14:30

## 2022-10-27 RX ADMIN — GABAPENTIN 300 MG: 300 CAPSULE ORAL at 15:55

## 2022-10-27 RX ADMIN — METOPROLOL TARTRATE 25 MG: 25 TABLET, FILM COATED ORAL at 08:43

## 2022-10-27 RX ADMIN — VENLAFAXINE HYDROCHLORIDE 187.5 MG: 37.5 CAPSULE, EXTENDED RELEASE ORAL at 08:54

## 2022-10-27 RX ADMIN — GABAPENTIN 300 MG: 300 CAPSULE ORAL at 08:41

## 2022-10-27 RX ADMIN — QUETIAPINE FUMARATE 50 MG: 25 TABLET ORAL at 17:41

## 2022-10-27 RX ADMIN — ACETAMINOPHEN 650 MG: 325 TABLET, FILM COATED ORAL at 05:05

## 2022-10-27 RX ADMIN — Medication 1 PATCH: at 08:45

## 2022-10-27 RX ADMIN — QUETIAPINE FUMARATE 50 MG: 25 TABLET ORAL at 08:42

## 2022-10-27 RX ADMIN — GABAPENTIN 300 MG: 300 CAPSULE ORAL at 21:43

## 2022-10-27 RX ADMIN — HEPARIN SODIUM 5000 UNITS: 5000 INJECTION INTRAVENOUS; SUBCUTANEOUS at 21:43

## 2022-10-27 RX ADMIN — AMLODIPINE BESYLATE 10 MG: 10 TABLET ORAL at 08:40

## 2022-10-27 RX ADMIN — MICONAZOLE NITRATE: 20 CREAM TOPICAL at 08:45

## 2022-10-27 RX ADMIN — ACETAMINOPHEN 650 MG: 325 TABLET, FILM COATED ORAL at 21:43

## 2022-10-27 RX ADMIN — Medication 250 MG: at 17:41

## 2022-10-27 RX ADMIN — PSYLLIUM HUSK 1 PACKET: 3.4 POWDER ORAL at 12:33

## 2022-10-27 RX ADMIN — METOPROLOL TARTRATE 25 MG: 25 TABLET, FILM COATED ORAL at 21:43

## 2022-10-27 RX ADMIN — HEPARIN SODIUM 5000 UNITS: 5000 INJECTION INTRAVENOUS; SUBCUTANEOUS at 05:05

## 2022-10-27 NOTE — ASSESSMENT & PLAN NOTE
Reports having multiple loose bowel movements  C diff negative  Continue florastor, Metamucil immodium prn

## 2022-10-27 NOTE — ASSESSMENT & PLAN NOTE
Malnutrition Findings:   Adult Malnutrition type: Acute illness  Adult Degree of Malnutrition: Malnutrition of moderate degree  Malnutrition Characteristics: Fat loss, Muscle loss, Inadequate energy, Weight loss                  360 Statement: Acute moderate pro, [place] malnutrition d/t condition as evidence by mild muscle and fat loss at temples, orbitals, triceps, <75% energy intake > 7 days, 18 1% wt loss x 1 month, BMI 18 4, currently treated with liberalized oral diet, tried supplements, but PT refused, will continue to monitor for food preferences and additional snacks in between meals  BMI Findings:  Adult BMI Classifications: Underweight < 18 5        Body mass index is 18 4 kg/m²

## 2022-10-27 NOTE — PROGRESS NOTES
Progress Note - Infectious Disease   Daniele Braun 45 y o  female MRN: [id number]  Unit/Bed#: [place] 2 -01 Encounter: [id number]    Impression/Plan:  1  MSSA bacteremia  Patient was found down for prolonged period of time with evidence of multiple wounds on admission  Suspect cutaneous vs endovascular source, likely phlebitis as there is report of RUE erythema surrounding prior IV in addition to palpable cord in left antecub  TTE negative for obvious vegetation  CHERYLE was planned but then cancelled due to tenuous respiratory status  Repeat TTE still without good visualization of aortic valve  Patient completed 4 full weeks of IV Cefazolin treatment  No indication for ongoing antibiotics at this time   -monitor patient off antibiotics  -monitor CBCD and BMP  -monitor vitals  -patient will need surveillance blood cultures 2 weeks after finishing IV antibiotic treatment, to be drawn 11/8/2022     2  Toxic Metabolic encephalopathy   Likely multifactorial etiology with uremia playing a role  This was initially thought to be secondary to gabapentin and morphine use in the setting of renal failure, hypotension, rhabdomyolysis   MRI brain negative   Lumbar puncture with opening pressure 26  Negative ME panel  CSF fluid culture not collected   The most likely to be secondary to metabolic issues, medication effect, and possible Wernicke's encephalopathy  Psychiatric issues also likely playing a role   Neurology has reassessed patient  Mental status remains altered but she is more interactive today  She is being considered for [facility] brain injury unit   -serial neuro exams  -treat metabolic issues and psychiatric issues  -continue follow up with behavioral health  -continue follow up with neurology   -no additional ID workup needed     3  Acute renal failure   Creatinine on admission 5 88 with CK over 32,000   Creatinine reached plateau, now down trending   Likely multifactorial etiology in the setting of rhabdomyolysis and dehydration  Likely ANUJA/ATN    Most recent creatinine was improved to 0 75   -monitor creatinine  -dose adjust antibiotics for renal function as needed  -avoid nephrotoxins  -continue follow up with nephrology     4  Tobacco abuse   Encourage cessation as recommended by primary team   -NRT per primary service     5  Antibiotic allergy  Reports hives with penicillin  Patient tolerated cephalosporin during this hospitalization without difficulty  -monitor patient for adverse medication reactions     6  Diarrhea  Per staff she started with diarrhea late yesterday afternoon  Now already with 2 watery stools this morning  Patient denies abdominal pain but feels like she is going to have a bowel movement again soon  C diff PCR was ordered but patient has had no further episodes of watery stool  Will cancel test now   -cancel c diff PCR order  -serial abdominal exams  -monitor GI symptoms  -monitor stool ouput    Given there are no ongoing infectious disease concerns the infectious disease team will sign off at this time  Please call with new questions or concerns  Above plan was discussed in detail with patient at the bedside  Above plan was discussed in detail with SLIM  Antibiotics:  No current antibiotic use    Subjective:  Patient reports she's fine today  Had loose BM this morning, aid saved in hat and there was no watery output  She has no fever, chills, sweats, shakes; no nausea, vomiting, abdominal pain, diarrhea, or dysuria; no cough, shortness of breath, or chest pain  No new symptoms  Objective:  Vitals:  Temp:  [98 2 °F (36 8 °C)-98 6 °F (37 °C)] 98 3 °F (36 8 °C)  HR:  [] 117  Resp:  [17-18] 17  BP: (106-146)/(72-86) 146/86  SpO2:  [97 %-99 %] 97 %  Temp (24hrs), Av 4 °F (36 9 °C), Min:98 2 °F (36 8 °C), Max:98 6 °F (37 °C)  Current: Temperature: 98 3 °F (36 8 °C)     Physical Exam:   General Appearance:  Alert, interactive, nontoxic, in no acute distress   She appears comfortable sitting up in bed  Less confused  Throat: Oropharynx moist without lesions  Lungs: Tachycardic to auscultation bilaterally; no wheezes, rhonchi or rales; respirations unlabored on room air  Heart:  RRR; no murmur, rub or gallop  Abdomen:   Soft, non-tender, non-distended, positive bowel sounds  Extremities: No clubbing or cyanosis, no edema  Skin: No new rashes noted on exposed skin       Labs, Imaging, & Other studies:   All pertinent labs and imaging studies were personally reviewed  Results from last 7 days   Lab Units 10/26/22  1049 10/24/22  0530   WBC Thousand/uL 6 37 8 87   HEMOGLOBIN g/dL 10 5* 11 7   PLATELETS Thousands/uL 229 310     Results from last 7 days   Lab Units 10/26/22  1049   POTASSIUM mmol/L 3 5   CHLORIDE mmol/L 104   CO2 mmol/L 25   BUN mg/dL 12   CREATININE mg/dL 0 75   EGFR ml/min/1 73sq m 90   CALCIUM mg/dL 9 1

## 2022-10-27 NOTE — PLAN OF CARE
Problem: PAIN - ADULT  Goal: Verbalizes/displays adequate comfort level or baseline comfort level  Description: Interventions:  - Encourage patient to monitor pain and request assistance  - Assess pain using appropriate pain scale  - Administer analgesics based on type and severity of pain and evaluate response  - Implement non-pharmacological measures as appropriate and evaluate response  - Consider cultural and social influences on pain and pain management  - Notify physician/advanced practitioner if interventions unsuccessful or patient reports new pain  Outcome: Progressing     Problem: INFECTION - ADULT  Goal: Absence or prevention of progression during hospitalization  Description: INTERVENTIONS:  - Assess and monitor for signs and symptoms of infection  - Monitor lab/diagnostic results  - Monitor all insertion sites, i e  indwelling lines, tubes, and drains  - Monitor endotracheal if appropriate and nasal secretions for changes in amount and color  - [place] appropriate cooling/warming therapies per order  - Administer medications as ordered  - Instruct and encourage patient and family to use good hand hygiene technique  - Identify and instruct in appropriate isolation precautions for identified infection/condition  Outcome: Progressing     Problem: SAFETY ADULT  Goal: Patient will remain free of falls  Description: INTERVENTIONS:  - Educate patient/family on patient safety including physical limitations  - Instruct patient to call for assistance with activity   - Consult OT/PT to assist with strengthening/mobility   - Keep Call bell within reach  - Keep bed low and locked with side rails adjusted as appropriate  - Keep care items and personal belongings within reach  - Initiate and maintain comfort rounds  - Make Fall Risk Sign visible to staff  - Offer Toileting every 2 Hours, in advance of need  - Initiate/Maintain bed alarm  - Obtain necessary fall risk management equipment: walker  - Apply yellow socks and bracelet for high fall risk patients  - Consider moving patient to room near nurses station  Outcome: Progressing  Goal: Maintain or return to baseline ADL function  Description: INTERVENTIONS:  -  Assess patient's ability to carry out ADLs; assess patient's baseline for ADL function and identify physical deficits which impact ability to perform ADLs (bathing, care of mouth/teeth, toileting, grooming, dressing, etc )  - Assess/evaluate cause of self-care deficits   - Assess range of motion  - Assess patient's mobility; develop plan if impaired  - Assess patient's need for assistive devices and provide as appropriate  - Encourage maximum independence but intervene and supervise when necessary  - Involve family in performance of ADLs  - Assess for home care needs following discharge   - Consider OT consult to assist with ADL evaluation and planning for discharge  - Provide patient education as appropriate  Outcome: Progressing  Goal: Maintains/Returns to pre admission functional level  Description: INTERVENTIONS:  - Perform BMAT or MOVE assessment daily    - Set and communicate daily mobility goal to care team and patient/family/caregiver  - Collaborate with rehabilitation services on mobility goals if consulted  - Perform Range of Motion 4-6 times a day  - Reposition patient every 2 hours    - Dangle patient 3-4 times a day  - Stand patient 3 times a day  - Ambulate patient 3-4 times a day  - Out of bed for toileting  - Record patient progress and toleration of activity level   Outcome: Progressing     Problem: DISCHARGE PLANNING  Goal: Discharge to home or other facility with appropriate resources  Description: INTERVENTIONS:  - Identify barriers to discharge w/patient and caregiver  - Arrange for needed discharge resources and transportation as appropriate  - Identify discharge learning needs (meds, wound care, etc )  - Arrange for interpretive services to assist at discharge as needed  - Refer to Case Management Department for coordinating discharge planning if the patient needs post-hospital services based on physician/advanced practitioner order or complex needs related to functional status, cognitive ability, or social support system  Outcome: Progressing     Problem: Knowledge Deficit  Goal: Patient/family/caregiver demonstrates understanding of disease process, treatment plan, medications, and discharge instructions  Description: Complete learning assessment and assess knowledge base    Interventions:  - Provide teaching at level of understanding  - Provide teaching via preferred learning methods  Outcome: Progressing     Problem: Potential for Falls  Goal: Patient will remain free of falls  Description: INTERVENTIONS:  - Educate patient/family on patient safety including physical limitations  - Instruct patient to call for assistance with activity   - Consult OT/PT to assist with strengthening/mobility   - Keep Call bell within reach  - Keep bed low and locked with side rails adjusted as appropriate  - Keep care items and personal belongings within reach  - Initiate and maintain comfort rounds  - Make Fall Risk Sign visible to staff  - Offer Toileting every 2 Hours, in advance of need  - Initiate/Maintain bed alarm  - Obtain necessary fall risk management equipment: walker  - Apply yellow socks and bracelet for high fall risk patients  - Consider moving patient to room near nurses station  Outcome: Progressing     Problem: MOBILITY - ADULT  Goal: Maintain or return to baseline ADL function  Description: INTERVENTIONS:  -  Assess patient's ability to carry out ADLs; assess patient's baseline for ADL function and identify physical deficits which impact ability to perform ADLs (bathing, care of mouth/teeth, toileting, grooming, dressing, etc )  - Assess/evaluate cause of self-care deficits   - Assess range of motion  - Assess patient's mobility; develop plan if impaired  - Assess patient's need for assistive devices and provide as appropriate  - Encourage maximum independence but intervene and supervise when necessary  - Involve family in performance of ADLs  - Assess for home care needs following discharge   - Consider OT consult to assist with ADL evaluation and planning for discharge  - Provide patient education as appropriate  Outcome: Progressing  Goal: Maintains/Returns to pre admission functional level  Description: INTERVENTIONS:  - Perform BMAT or MOVE assessment daily    - Set and communicate daily mobility goal to care team and patient/family/caregiver  - Collaborate with rehabilitation services on mobility goals if consulted  - Perform Range of Motion 4-6 times a day  - Reposition patient every 2 hours  - Dangle patient 3-4 times a day  - Stand patient 3 times a day  - Ambulate patient 3-4 times a day  - Out of bed for toileting  - Record patient progress and toleration of activity level   Outcome: Progressing     Problem: Prexisting or High Potential for Compromised Skin Integrity  Goal: Skin integrity is maintained or improved  Description: INTERVENTIONS:  - Identify patients at risk for skin breakdown  - Assess and monitor skin integrity  - Assess and monitor nutrition and hydration status  - Monitor labs   - Assess for incontinence   - Turn and reposition patient  - Assist with mobility/ambulation  - Relieve pressure over bony prominences  - Avoid friction and shearing  - Provide appropriate hygiene as needed including keeping skin clean and dry  - Evaluate need for skin moisturizer/barrier cream  - Collaborate with interdisciplinary team   - Patient/family teaching  - Consider wound care consult   Outcome: Progressing     Problem: Nutrition/Hydration-ADULT  Goal: Nutrient/Hydration intake appropriate for improving, restoring or maintaining nutritional needs  Description: Monitor and assess patient's nutrition/hydration status for malnutrition   Collaborate with interdisciplinary team and initiate plan and interventions as ordered  Monitor patient's weight and dietary intake as ordered or per policy  Utilize nutrition screening tool and intervene as necessary  Determine patient's food preferences and provide high-protein, high-caloric foods as appropriate  INTERVENTIONS:  - Monitor oral intake, urinary output, labs, and treatment plans  - Assess nutrition and hydration status and recommend course of action  - Evaluate amount of meals eaten  - Assist patient with eating if necessary   - Allow adequate time for meals  - Recommend/ encourage appropriate diets, oral nutritional supplements, and vitamin/mineral supplements  - Order, calculate, and assess calorie counts as needed  - Recommend, monitor, and adjust tube feedings and TPN/PPN based on assessed needs  - Assess need for intravenous fluids  - Provide specific nutrition/hydration education as appropriate  - Include patient/family/caregiver in decisions related to nutrition  Outcome: Progressing     Problem: NEUROSENSORY - ADULT  Goal: Achieves maximal functionality and self care  Description: INTERVENTIONS  - Monitor swallowing and airway patency with patient fatigue and changes in neurological status  - Encourage and assist patient to increase activity and self care     - Encourage visually impaired, hearing impaired and aphasic patients to use assistive/communication devices  Outcome: Progressing  Goal: Achieves stable or improved neurological status  Description: INTERVENTIONS  - Monitor and report changes in neurological status  - Monitor vital signs such as temperature, blood pressure, glucose, and any other labs ordered   - Initiate measures to prevent increased intracranial pressure  - Monitor for seizure activity and implement precautions if appropriate      Outcome: Progressing     Problem: CARDIOVASCULAR - ADULT  Goal: Maintains optimal cardiac output and hemodynamic stability  Description: INTERVENTIONS:  - Monitor I/O, vital signs and rhythm  - Monitor for S/S and trends of decreased cardiac output  - Administer and titrate ordered vasoactive medications to optimize hemodynamic stability  - Assess quality of pulses, skin color and temperature  - Assess for signs of decreased coronary artery perfusion  - Instruct patient to report change in severity of symptoms  Outcome: Progressing     Problem: RESPIRATORY - ADULT  Goal: Achieves optimal ventilation and oxygenation  Description: INTERVENTIONS:  - Assess for changes in respiratory status  - Assess for changes in mentation and behavior  - Position to facilitate oxygenation and minimize respiratory effort  - Oxygen administered by appropriate delivery if ordered  - Initiate smoking cessation education as indicated  - Encourage broncho-pulmonary hygiene including cough, deep breathe, Incentive Spirometry  - Assess the need for suctioning and aspirate as needed  - Assess and instruct to report SOB or any respiratory difficulty  - Respiratory Therapy support as indicated  Outcome: Progressing     Problem: GASTROINTESTINAL - ADULT  Goal: Maintains adequate nutritional intake  Description: INTERVENTIONS:  - Monitor percentage of each meal consumed  - Identify factors contributing to decreased intake, treat as appropriate  - Assist with meals as needed  - Monitor I&O, weight, and lab values if indicated  - Obtain nutrition services referral as needed  Outcome: Progressing     Problem: METABOLIC, FLUID AND ELECTROLYTES - ADULT  Goal: Electrolytes maintained within normal limits  Description: INTERVENTIONS:  - Monitor labs and assess patient for signs and symptoms of electrolyte imbalances  - Administer electrolyte replacement as ordered  - Monitor response to electrolyte replacements, including repeat lab results as appropriate  - Instruct patient on fluid and nutrition as appropriate  Outcome: Progressing  Goal: Fluid balance maintained  Description: INTERVENTIONS:  - Monitor labs   - Monitor I/O and WT  - Instruct patient on fluid and nutrition as appropriate  - Assess for signs & symptoms of volume excess or deficit  Outcome: Progressing     Problem: SKIN/TISSUE INTEGRITY - ADULT  Goal: Skin Integrity remains intact(Skin Breakdown Prevention)  Description: Assess:  -Perform Erickson assessment every shift  -Clean and moisturize skin every day  -Inspect skin when repositioning, toileting, and assisting with ADLS  -Assess extremities for adequate circulation and sensation     Bed Management:  -Have minimal linens on bed & keep smooth, unwrinkled  -Change linens as needed when moist or perspiring  -Avoid sitting or lying in one position for more than 2 hours while in bed  -Keep HOB at 45 degrees     Toileting:  -Offer bedside commode  -Assess for incontinence every 2 hours  -Use incontinent care products after each incontinent episode such as brief    Activity:  -Mobilize patient 3-4 times a day  -Encourage activity and walks on unit  -Encourage or provide ROM exercises   -Turn and reposition patient every 2 Hours  -Use appropriate equipment to lift or move patient in bed  -Instruct/ Assist with weight shifting every 2 when out of bed in chair  -Consider limitation of chair time 2 hour intervals    Skin Care:  -Avoid use of baby powder, tape, friction and shearing, hot water or constrictive clothing  -Relieve pressure over bony prominences using pillows  -Do not massage red bony areas    Next Steps:  -Teach patient strategies to minimize risks such as walker  -Consider consults to  interdisciplinary teams such as wound  Outcome: Progressing  Goal: Incision(s), wounds(s) or drain site(s) healing without S/S of infection  Description: INTERVENTIONS  - Assess and document dressing, incision, wound bed, drain sites and surrounding tissue  - Provide patient and family education  - Perform skin care/dressing changes every PRN  Outcome: Progressing     Problem: MUSCULOSKELETAL - ADULT  Goal: Maintain or return mobility to safest level of function  Description: INTERVENTIONS:  - Assess patient's ability to carry out ADLs; assess patient's baseline for ADL function and identify physical deficits which impact ability to perform ADLs (bathing, care of mouth/teeth, toileting, grooming, dressing, etc )  - Assess/evaluate cause of self-care deficits   - Assess range of motion  - Assess patient's mobility  - Assess patient's need for assistive devices and provide as appropriate  - Encourage maximum independence but intervene and supervise when necessary  - Involve family in performance of ADLs  - Assess for home care needs following discharge   - Consider OT consult to assist with ADL evaluation and planning for discharge  - Provide patient education as appropriate  Outcome: Progressing

## 2022-10-27 NOTE — PLAN OF CARE
Problem: PAIN - ADULT  Goal: Verbalizes/displays adequate comfort level or baseline comfort level  Description: Interventions:  - Encourage patient to monitor pain and request assistance  - Assess pain using appropriate pain scale  - Administer analgesics based on type and severity of pain and evaluate response  - Implement non-pharmacological measures as appropriate and evaluate response  - Consider cultural and social influences on pain and pain management  - Notify physician/advanced practitioner if interventions unsuccessful or patient reports new pain  Outcome: Progressing     Problem: INFECTION - ADULT  Goal: Absence or prevention of progression during hospitalization  Description: INTERVENTIONS:  - Assess and monitor for signs and symptoms of infection  - Monitor lab/diagnostic results  - Monitor all insertion sites, i e  indwelling lines, tubes, and drains  - Monitor endotracheal if appropriate and nasal secretions for changes in amount and color  - [place] appropriate cooling/warming therapies per order  - Administer medications as ordered  - Instruct and encourage patient and family to use good hand hygiene technique  - Identify and instruct in appropriate isolation precautions for identified infection/condition  Outcome: Progressing     Problem: SAFETY ADULT  Goal: Patient will remain free of falls  Description: INTERVENTIONS:  - Educate patient/family on patient safety including physical limitations  - Instruct patient to call for assistance with activity   - Consult OT/PT to assist with strengthening/mobility   - Keep Call bell within reach  - Keep bed low and locked with side rails adjusted as appropriate  - Keep care items and personal belongings within reach  - Initiate and maintain comfort rounds  - Make Fall Risk Sign visible to staff  - Offer Toileting every 2 Hours, in advance of need  - Initiate/Maintain bed alarm  - Obtain necessary fall risk management equipment: walker  - Apply yellow socks and bracelet for high fall risk patients  - Consider moving patient to room near nurses station  Outcome: Progressing  Goal: Maintain or return to baseline ADL function  Description: INTERVENTIONS:  -  Assess patient's ability to carry out ADLs; assess patient's baseline for ADL function and identify physical deficits which impact ability to perform ADLs (bathing, care of mouth/teeth, toileting, grooming, dressing, etc )  - Assess/evaluate cause of self-care deficits   - Assess range of motion  - Assess patient's mobility; develop plan if impaired  - Assess patient's need for assistive devices and provide as appropriate  - Encourage maximum independence but intervene and supervise when necessary  - Involve family in performance of ADLs  - Assess for home care needs following discharge   - Consider OT consult to assist with ADL evaluation and planning for discharge  - Provide patient education as appropriate  Outcome: Progressing  Goal: Maintains/Returns to pre admission functional level  Description: INTERVENTIONS:  - Perform BMAT or MOVE assessment daily    - Set and communicate daily mobility goal to care team and patient/family/caregiver  - Collaborate with rehabilitation services on mobility goals if consulted  - Perform Range of Motion 4-6 times a day  - Reposition patient every 2 hours    - Dangle patient 3-4 times a day  - Stand patient 3 times a day  - Ambulate patient 3-4 times a day  - Out of bed for toileting  - Record patient progress and toleration of activity level   Outcome: Progressing     Problem: DISCHARGE PLANNING  Goal: Discharge to home or other facility with appropriate resources  Description: INTERVENTIONS:  - Identify barriers to discharge w/patient and caregiver  - Arrange for needed discharge resources and transportation as appropriate  - Identify discharge learning needs (meds, wound care, etc )  - Arrange for interpretive services to assist at discharge as needed  - Refer to Case Management Department for coordinating discharge planning if the patient needs post-hospital services based on physician/advanced practitioner order or complex needs related to functional status, cognitive ability, or social support system  Outcome: Progressing     Problem: Knowledge Deficit  Goal: Patient/family/caregiver demonstrates understanding of disease process, treatment plan, medications, and discharge instructions  Description: Complete learning assessment and assess knowledge base    Interventions:  - Provide teaching at level of understanding  - Provide teaching via preferred learning methods  Outcome: Progressing     Problem: Potential for Falls  Goal: Patient will remain free of falls  Description: INTERVENTIONS:  - Educate patient/family on patient safety including physical limitations  - Instruct patient to call for assistance with activity   - Consult OT/PT to assist with strengthening/mobility   - Keep Call bell within reach  - Keep bed low and locked with side rails adjusted as appropriate  - Keep care items and personal belongings within reach  - Initiate and maintain comfort rounds  - Make Fall Risk Sign visible to staff  - Offer Toileting every 2 Hours, in advance of need  - Initiate/Maintain bed alarm  - Obtain necessary fall risk management equipment: walker  - Apply yellow socks and bracelet for high fall risk patients  - Consider moving patient to room near nurses station  Outcome: Progressing     Problem: MOBILITY - ADULT  Goal: Maintain or return to baseline ADL function  Description: INTERVENTIONS:  -  Assess patient's ability to carry out ADLs; assess patient's baseline for ADL function and identify physical deficits which impact ability to perform ADLs (bathing, care of mouth/teeth, toileting, grooming, dressing, etc )  - Assess/evaluate cause of self-care deficits   - Assess range of motion  - Assess patient's mobility; develop plan if impaired  - Assess patient's need for assistive devices and provide as appropriate  - Encourage maximum independence but intervene and supervise when necessary  - Involve family in performance of ADLs  - Assess for home care needs following discharge   - Consider OT consult to assist with ADL evaluation and planning for discharge  - Provide patient education as appropriate  Outcome: Progressing  Goal: Maintains/Returns to pre admission functional level  Description: INTERVENTIONS:  - Perform BMAT or MOVE assessment daily    - Set and communicate daily mobility goal to care team and patient/family/caregiver  - Collaborate with rehabilitation services on mobility goals if consulted  - Perform Range of Motion 4-6 times a day  - Reposition patient every 2 hours  - Dangle patient 3-4 times a day  - Stand patient 3 times a day  - Ambulate patient 3-4 times a day  - Out of bed for toileting  - Record patient progress and toleration of activity level   Outcome: Progressing     Problem: Prexisting or High Potential for Compromised Skin Integrity  Goal: Skin integrity is maintained or improved  Description: INTERVENTIONS:  - Identify patients at risk for skin breakdown  - Assess and monitor skin integrity  - Assess and monitor nutrition and hydration status  - Monitor labs   - Assess for incontinence   - Turn and reposition patient  - Assist with mobility/ambulation  - Relieve pressure over bony prominences  - Avoid friction and shearing  - Provide appropriate hygiene as needed including keeping skin clean and dry  - Evaluate need for skin moisturizer/barrier cream  - Collaborate with interdisciplinary team   - Patient/family teaching  - Consider wound care consult   Outcome: Progressing     Problem: Nutrition/Hydration-ADULT  Goal: Nutrient/Hydration intake appropriate for improving, restoring or maintaining nutritional needs  Description: Monitor and assess patient's nutrition/hydration status for malnutrition   Collaborate with interdisciplinary team and initiate plan and interventions as ordered  Monitor patient's weight and dietary intake as ordered or per policy  Utilize nutrition screening tool and intervene as necessary  Determine patient's food preferences and provide high-protein, high-caloric foods as appropriate  INTERVENTIONS:  - Monitor oral intake, urinary output, labs, and treatment plans  - Assess nutrition and hydration status and recommend course of action  - Evaluate amount of meals eaten  - Assist patient with eating if necessary   - Allow adequate time for meals  - Recommend/ encourage appropriate diets, oral nutritional supplements, and vitamin/mineral supplements  - Order, calculate, and assess calorie counts as needed  - Recommend, monitor, and adjust tube feedings and TPN/PPN based on assessed needs  - Assess need for intravenous fluids  - Provide specific nutrition/hydration education as appropriate  - Include patient/family/caregiver in decisions related to nutrition  Outcome: Progressing     Problem: NEUROSENSORY - ADULT  Goal: Achieves maximal functionality and self care  Description: INTERVENTIONS  - Monitor swallowing and airway patency with patient fatigue and changes in neurological status  - Encourage and assist patient to increase activity and self care     - Encourage visually impaired, hearing impaired and aphasic patients to use assistive/communication devices  Outcome: Progressing  Goal: Achieves stable or improved neurological status  Description: INTERVENTIONS  - Monitor and report changes in neurological status  - Monitor vital signs such as temperature, blood pressure, glucose, and any other labs ordered   - Initiate measures to prevent increased intracranial pressure  - Monitor for seizure activity and implement precautions if appropriate      Outcome: Progressing     Problem: CARDIOVASCULAR - ADULT  Goal: Maintains optimal cardiac output and hemodynamic stability  Description: INTERVENTIONS:  - Monitor I/O, vital signs and rhythm  - Monitor for S/S and trends of decreased cardiac output  - Administer and titrate ordered vasoactive medications to optimize hemodynamic stability  - Assess quality of pulses, skin color and temperature  - Assess for signs of decreased coronary artery perfusion  - Instruct patient to report change in severity of symptoms  Outcome: Progressing     Problem: RESPIRATORY - ADULT  Goal: Achieves optimal ventilation and oxygenation  Description: INTERVENTIONS:  - Assess for changes in respiratory status  - Assess for changes in mentation and behavior  - Position to facilitate oxygenation and minimize respiratory effort  - Oxygen administered by appropriate delivery if ordered  - Initiate smoking cessation education as indicated  - Encourage broncho-pulmonary hygiene including cough, deep breathe, Incentive Spirometry  - Assess the need for suctioning and aspirate as needed  - Assess and instruct to report SOB or any respiratory difficulty  - Respiratory Therapy support as indicated  Outcome: Progressing     Problem: GASTROINTESTINAL - ADULT  Goal: Maintains adequate nutritional intake  Description: INTERVENTIONS:  - Monitor percentage of each meal consumed  - Identify factors contributing to decreased intake, treat as appropriate  - Assist with meals as needed  - Monitor I&O, weight, and lab values if indicated  - Obtain nutrition services referral as needed  Outcome: Progressing     Problem: METABOLIC, FLUID AND ELECTROLYTES - ADULT  Goal: Electrolytes maintained within normal limits  Description: INTERVENTIONS:  - Monitor labs and assess patient for signs and symptoms of electrolyte imbalances  - Administer electrolyte replacement as ordered  - Monitor response to electrolyte replacements, including repeat lab results as appropriate  - Instruct patient on fluid and nutrition as appropriate  Outcome: Progressing  Goal: Fluid balance maintained  Description: INTERVENTIONS:  - Monitor labs   - Monitor I/O and WT  - Instruct patient on fluid and nutrition as appropriate  - Assess for signs & symptoms of volume excess or deficit  Outcome: Progressing     Problem: SKIN/TISSUE INTEGRITY - ADULT  Goal: Skin Integrity remains intact(Skin Breakdown Prevention)  Description: Assess:  -Perform Erickson assessment every shift  -Clean and moisturize skin every day  -Inspect skin when repositioning, toileting, and assisting with ADLS  -Assess extremities for adequate circulation and sensation     Bed Management:  -Have minimal linens on bed & keep smooth, unwrinkled  -Change linens as needed when moist or perspiring  -Avoid sitting or lying in one position for more than 2 hours while in bed  -Keep HOB at 45 degrees     Toileting:  -Offer bedside commode  -Assess for incontinence every 2 hours  -Use incontinent care products after each incontinent episode such as brief    Activity:  -Mobilize patient 3-4 times a day  -Encourage activity and walks on unit  -Encourage or provide ROM exercises   -Turn and reposition patient every 2 Hours  -Use appropriate equipment to lift or move patient in bed  -Instruct/ Assist with weight shifting every 2 when out of bed in chair  -Consider limitation of chair time 2 hour intervals    Skin Care:  -Avoid use of baby powder, tape, friction and shearing, hot water or constrictive clothing  -Relieve pressure over bony prominences using pillows  -Do not massage red bony areas    Next Steps:  -Teach patient strategies to minimize risks such as walker  -Consider consults to  interdisciplinary teams such as wound  Outcome: Progressing  Goal: Incision(s), wounds(s) or drain site(s) healing without S/S of infection  Description: INTERVENTIONS  - Assess and document dressing, incision, wound bed, drain sites and surrounding tissue  - Provide patient and family education  - Perform skin care/dressing changes every PRN  Outcome: Progressing     Problem: MUSCULOSKELETAL - ADULT  Goal: Maintain or return mobility to safest level of function  Description: INTERVENTIONS:  - Assess patient's ability to carry out ADLs; assess patient's baseline for ADL function and identify physical deficits which impact ability to perform ADLs (bathing, care of mouth/teeth, toileting, grooming, dressing, etc )  - Assess/evaluate cause of self-care deficits   - Assess range of motion  - Assess patient's mobility  - Assess patient's need for assistive devices and provide as appropriate  - Encourage maximum independence but intervene and supervise when necessary  - Involve family in performance of ADLs  - Assess for home care needs following discharge   - Consider OT consult to assist with ADL evaluation and planning for discharge  - Provide patient education as appropriate  Outcome: Progressing

## 2022-10-27 NOTE — ASSESSMENT & PLAN NOTE
· Lumbar radiculopathy with previously scheduled surgery now on hold  Discussed case with patient's outpatient neurosurgeon  Initial surgery was canceled prior to hospitalization  It was decided that patient will need a much larger procedure with multilevel fusion and scoliosis correction  They discussed that surgery would be high risk given prolonged anesthesia time and comorbidities  · Prior to admission was on gabapentin and morphine IR 15 mg  · Gabapentin resumed at lower dose  Will titrate to symptoms  Monitor off morphine

## 2022-10-27 NOTE — PROGRESS NOTES
[facility]  Progress Note - Jojo Sloan 1970, 45 y o  female MRN: [id number]  Unit/Bed#: [facility] 2 -01 Encounter: [id number]  Primary Care Provider: Lei Bhandari MD   Date and time admitted to hospital: 9/19/2022  8:04 AM    * Toxic metabolic encephalopathy  Assessment & Plan  Due to multiple metabolic derangements, home medications morphine and gabapentin in the setting of acute kidney injury  Overall from admission she has improved but is still confused  Patient has been evaluated by Neurology, Gerhard Leonard during prolonged hospital stay  MRI performed 10/14/2022 with development of severe periventricular and white matter hyperintensities without mass effect enhancement or hemorrhage correlation with toxic encephalopathy  Recommendation for 3 month follow-up to ensure resolution  Patient completed antibiotic course  Medically stable for discharge to inpatient rehab          Mild protein-calorie malnutrition ([address] )  Assessment & Plan  Malnutrition Findings:   Adult Malnutrition type: Acute illness  Adult Degree of Malnutrition: Malnutrition of moderate degree  Malnutrition Characteristics: Fat loss, Muscle loss, Inadequate energy, Weight loss                  360 Statement: Acute moderate pro, chelsey malnutrition d/t condition as evidence by mild muscle and fat loss at temples, orbitals, triceps, <75% energy intake > 7 days, 18 1% wt loss x 1 month, BMI 18 4, currently treated with liberalized oral diet, tried supplements, but PT refused, will continue to monitor for food preferences and additional snacks in between meals  BMI Findings:  Adult BMI Classifications: Underweight < 18 5        Body mass index is 18 4 kg/m²         Diarrhea  Assessment & Plan  Reports having multiple loose bowel movements  C diff negative  Continue florastor, Metamucil immodium prn    Bacteremia due to methicillin susceptible Staphylococcus aureus (MSSA)  Assessment & Plan  MSSA bacteremia, repeat cultures neg for greater than 72 hours  Echo with no vegetation  Id following  Completed abx 10/26/22   Repeat blood cultures 1 week from completion of antibiotics    Traumatic rhabdomyolysis ([address] )  Assessment & Plan  · Secondary to fall, patient had been laying in bed for approximately 4 days  · She was subsequently brought to the hospital and found to be in acute rhabdomyolysis with ANUJA  · Had been on multiple sedating medications including MS Contin as well as gabapentin, which likely were contributing to sedation  · Now resolved    Transaminitis  Assessment & Plan  · Secondary to rhabdomyolysis  No evidence of liver injury on CT imaging  · Seen by GI  Hepatic duplex also negative  ARF (acute renal failure) (HCC)  Assessment & Plan  · ANUJA/ATN secondary to rhabdomyolysis  No evidence of renal obstruction on imaging    Kidney function has returned to normal      DDD (degenerative disc disease), lumbosacral  Assessment & Plan  · Lumbar radiculopathy with previously scheduled surgery now on hold  Discussed case with patient's outpatient neurosurgeon  Initial surgery was canceled prior to hospitalization  It was decided that patient will need a much larger procedure with multilevel fusion and scoliosis correction  They discussed that surgery would be high risk given prolonged anesthesia time and comorbidities  · Prior to admission was on gabapentin and morphine IR 15 mg  · Gabapentin resumed at lower dose  Will titrate to symptoms  Monitor off morphine      Tobacco abuse  Assessment & Plan  · Nicotine patch ordered    Depression  Assessment & Plan  · Does have history of suicide attempt a currently does not show any signs of thoughts of self-harm  · Psychiatry consultation appreciated  · Continue current medications      VTE Pharmacologic Prophylaxis:  Heparin    Patient Centered Rounds:  Patient care rounds were performed with nursing    Time Spent for Care: 30  More than 50% of total time spent on counseling and coordination of care as described above  Current Length of Stay: 38 day(s)    Current Patient Status: Inpatient   Certification Statement: The patient will continue to require additional inpatient hospital stay due to awaiting placement    Discharge Plan:  Pending placement    Code Status: Level 1 - Full Code      Subjective:   Patient seen and evaluated at bedside  No overnight events  Objective:     Vitals:   Temp (24hrs), Av 4 °F (36 9 °C), Min:98 2 °F (36 8 °C), Max:98 6 °F (37 °C)    Temp:  [98 2 °F (36 8 °C)-98 6 °F (37 °C)] 98 3 °F (36 8 °C)  HR:  [] 117  Resp:  [17-18] 17  BP: (106-146)/(72-86) 146/86  SpO2:  [97 %-99 %] 97 %  Body mass index is 18 4 kg/m²  Input and Output Summary (last 24 hours): Intake/Output Summary (Last 24 hours) at 10/27/2022 0943  Last data filed at 10/27/2022 0511  Gross per 24 hour   Intake 10 ml   Output --   Net 10 ml       Physical Exam:     Physical Exam  Vitals reviewed  Constitutional:       General: She is not in acute distress  Appearance: She is well-developed  She is not ill-appearing, toxic-appearing or diaphoretic  HENT:      Head: Normocephalic and atraumatic  Mouth/Throat:      Mouth: Mucous membranes are moist    Eyes:      General: No scleral icterus  Extraocular Movements: Extraocular movements intact  Cardiovascular:      Rate and Rhythm: Normal rate and regular rhythm  Heart sounds: Normal heart sounds  Pulmonary:      Effort: Pulmonary effort is normal  No respiratory distress  Breath sounds: Normal breath sounds  No wheezing or rales  Abdominal:      General: There is no distension  Palpations: Abdomen is soft  Tenderness: There is no abdominal tenderness  There is no guarding or rebound  Musculoskeletal:         General: No swelling, tenderness or deformity  Skin:     General: Skin is warm and dry  Neurological:      Mental Status: She is alert   She is disoriented  Comments: Oriented to self only, not to hospital situation         Additional Data:     Labs:  I have reviewed pertinent results     Results from last 7 days   Lab Units 10/26/22  1049 10/24/22  0530   WBC Thousand/uL 6 37 8 87   HEMOGLOBIN g/dL 10 5* 11 7   HEMATOCRIT % 33 8* 37 8   PLATELETS Thousands/uL 229 310   NEUTROS PCT %  --  66   LYMPHS PCT %  --  26   MONOS PCT %  --  5   EOS PCT %  --  2     Results from last 7 days   Lab Units 10/26/22  1049   SODIUM mmol/L 140   POTASSIUM mmol/L 3 5   CHLORIDE mmol/L 104   CO2 mmol/L 25   BUN mg/dL 12   CREATININE mg/dL 0 75   ANION GAP mmol/L 11   CALCIUM mg/dL 9 1   GLUCOSE RANDOM mg/dL 138                         Imaging: I have reviewed pertinent imaging       Recent Cultures (last 7 days):           Last 24 Hours Medication List:   Current Facility-Administered Medications   Medication Dose Route Frequency Provider Last Rate   • acetaminophen  650 mg Oral Q6H PRN Santiago Espinoza DO     • ALPRAZolam  0 5 mg Oral HS PRN Eli Quesada MD     • amLODIPine  10 mg Oral Daily Britni Sears MD     • gabapentin  300 mg Oral TID Laurie Marsh MD     • heparin (porcine)  5,000 Units Subcutaneous [facility] Palmira Adan DO     • levalbuterol  1 25 mg Nebulization Q4H PRN Marguerite Hansen DO     • loperamide  2 mg Oral 4x Daily PRN Eli Quesada MD     • metoprolol tartrate  25 mg Oral Q12H [facility] Inder Estrada MD     • VANDANA ANTIFUNGAL   Topical BID [facility] Cristine Piper DO     • nicotine  1 patch Transdermal Daily Atul Adan DO     • OLANZapine  5 mg Oral Q6H PRN Marguerite Hansen DO     • ondansetron  4 mg Intravenous Q4H PRN Santiago Espinoza DO     • psyllium  1 packet Oral Daily Heather Berg DO     • QUEtiapine  50 mg Oral BID Eli Quesada MD     • saccharomyces boulardii  250 mg Oral BID Eli Quesada MD     • venlafaxine  187 5 mg Oral Daily Norman Newman DO          Today, Patient Was Seen By: Heather Berg    ** Please Note: Dictation voice to text software may have been used in the creation of this document   **

## 2022-10-27 NOTE — ASSESSMENT & PLAN NOTE
MSSA bacteremia, repeat cultures neg for greater than 72 hours  Echo with no vegetation  Id following  Completed abx 10/26/22   Repeat blood cultures 1 week from completion of antibiotics

## 2022-10-28 RX ADMIN — HEPARIN SODIUM 5000 UNITS: 5000 INJECTION INTRAVENOUS; SUBCUTANEOUS at 21:44

## 2022-10-28 RX ADMIN — QUETIAPINE FUMARATE 50 MG: 25 TABLET ORAL at 08:02

## 2022-10-28 RX ADMIN — HEPARIN SODIUM 5000 UNITS: 5000 INJECTION INTRAVENOUS; SUBCUTANEOUS at 06:24

## 2022-10-28 RX ADMIN — Medication 1 PATCH: at 08:06

## 2022-10-28 RX ADMIN — AMLODIPINE BESYLATE 10 MG: 10 TABLET ORAL at 08:02

## 2022-10-28 RX ADMIN — METOPROLOL TARTRATE 25 MG: 25 TABLET, FILM COATED ORAL at 08:02

## 2022-10-28 RX ADMIN — HEPARIN SODIUM 5000 UNITS: 5000 INJECTION INTRAVENOUS; SUBCUTANEOUS at 16:21

## 2022-10-28 RX ADMIN — VENLAFAXINE HYDROCHLORIDE 187.5 MG: 37.5 CAPSULE, EXTENDED RELEASE ORAL at 08:03

## 2022-10-28 RX ADMIN — ACETAMINOPHEN 650 MG: 325 TABLET, FILM COATED ORAL at 06:24

## 2022-10-28 RX ADMIN — Medication 250 MG: at 17:12

## 2022-10-28 RX ADMIN — MICONAZOLE NITRATE: 20 CREAM TOPICAL at 08:04

## 2022-10-28 RX ADMIN — METOPROLOL TARTRATE 25 MG: 25 TABLET, FILM COATED ORAL at 21:44

## 2022-10-28 RX ADMIN — PSYLLIUM HUSK 1 PACKET: 3.4 POWDER ORAL at 08:02

## 2022-10-28 RX ADMIN — GABAPENTIN 300 MG: 300 CAPSULE ORAL at 21:44

## 2022-10-28 RX ADMIN — GABAPENTIN 300 MG: 300 CAPSULE ORAL at 08:02

## 2022-10-28 RX ADMIN — Medication 250 MG: at 08:02

## 2022-10-28 RX ADMIN — ACETAMINOPHEN 650 MG: 325 TABLET, FILM COATED ORAL at 21:44

## 2022-10-28 RX ADMIN — GABAPENTIN 300 MG: 300 CAPSULE ORAL at 16:22

## 2022-10-28 RX ADMIN — MICONAZOLE NITRATE: 20 CREAM TOPICAL at 17:13

## 2022-10-28 RX ADMIN — QUETIAPINE FUMARATE 50 MG: 25 TABLET ORAL at 17:13

## 2022-10-28 NOTE — OCCUPATIONAL THERAPY NOTE
Occupational Therapy Progress Note     Patient Name: Jojo Sloan  Today's Date: 10/28/2022  Problem List  Principal Problem:    Toxic metabolic encephalopathy  Active Problems:    Depression    Tobacco abuse    DDD (degenerative disc disease), lumbosacral    ARF (acute renal failure) (HCC)    Transaminitis    Abnormal CT of the chest    Traumatic rhabdomyolysis (HCC)    D-dimer, elevated    Leg edema, right    Localized swelling of right upper extremity    Bacteremia due to methicillin susceptible Staphylococcus aureus (MSSA)    Pulmonary edema    Aspiration pneumonitis (HCC)    Hypokalemia    Hypernatremia    Diarrhea    Mild protein-calorie malnutrition (HCC)    Moderate protein-calorie malnutrition ([address] )       10/28/22 1450   OT Last Visit   OT Visit Date 10/28/22   Note Type   Note Type Treatment   Pain Assessment   Pain Assessment Tool 0-10   Pain Score 5   Pain Location/Orientation Orientation: Right;[place] [facility]   Hospital Pain Intervention(s) Repositioned; Ambulation/increased activity; Rest;Emotional support   Restrictions/Precautions   Weight Bearing Precautions Per Order No   Other Precautions 1:1;Chair Alarm; Bed Alarm;Pain; Fall Risk;Cognitive   ADL   Where Assessed Edge of bed   Grooming Assistance 4  Minimal Assistance   Grooming Deficit Wash/dry hands; Wash/dry face; Teeth care   Grooming Comments Standing at sink, Nicolette to initiate task, once initiated pt able to complete with VCs   Bed Mobility   Supine to Sit 5  Supervision   Additional items Assist x 1;Bedrails;Verbal cues; Impulsive   Sit to Supine 5  Supervision   Additional items Assist x 1;Bedrails;Verbal cues; Impulsive   Transfers   Sit to Stand 5  Supervision   Additional items Assist x 1; Increased time required; Impulsive;Verbal cues; Other  (FWW)   Stand to Sit 5  Supervision   Additional items Assist x 1; Increased time required; Impulsive;Verbal cues; Other  (FWW)   Toilet transfer 4  Minimal assistance   Additional items Assist x 1; Increased time required;Standard toilet; Other;Impulsive;Verbal cues  (grab bar, HHA)   Functional Mobility   Functional Mobility 4  Minimal assistance   Additional Comments FWW, requires max cuing for safety; Ax2 for safety   Cognition   Overall Cognitive Status Impaired   Arousal/Participation Responsive; Alert   Attention Attends with cues to redirect   Orientation Level Oriented to person;Oriented to place; Disoriented to situation;Disoriented to time   Memory Decreased recall of precautions;Decreased recall of recent events;Decreased short term memory;Decreased recall of biographical information   Following Commands Follows one step commands with increased time or repetition   Activity Tolerance   Activity Tolerance Treatment limited secondary to medical complications (Comment); Patient limited by pain   Medical Staff Made Aware Yes   Assessment   Assessment Patient participated in skilled OT session this date with interventions consisting of therapeutic activities, therapeutic exercises, and self-care/ADL training to increase B UE strength, functional endurance/activity tolerance, static/dynamic sitting/standing balance, and overall safety awareness to increase (I) with ADLs/IADLs to return to PLOF  Provided education on energy conservation techniques, deep breathing techniques, fall prevention strategies, and overall safety awareness  Patient agreeable to OT treatment session, upon arrival patient was found supine in bed  Patient requiring verbal cues for safety and verbal cues for pacing through activity steps  Pt requiring encouragement today for participation  Patient continues to be functioning below baseline level, occupational performance remains limited secondary to factors listed above and increased risk for falls and injury  The patient's raw score on the AM-PAC Daily Activity inpatient short form is 16, standardized score is 35 96, less than 39 4   Patients at this level are likely to benefit from discharge to post-acute rehabilitation services  Please refer to the recommendation of the Occupational Therapist for safe discharge planning  Plan   Treatment Interventions ADL retraining;Functional transfer training;UE strengthening/ROM; Endurance training;Patient/family training;Equipment evaluation/education; Neuromuscular reeducation; Fine motor coordination activities; Compensatory technique education;Continued evaluation; Energy conservation; Activityengagement;Cognitive reorientation   Goal Expiration Date 10/31/22   OT Treatment Day 11   OT Frequency 2-3x/wk   Recommendation   OT Discharge Recommendation Post acute rehabilitation services   AM-PAC Daily Activity Inpatient   Lower Body Dressing 2   Bathing 2   Toileting 3   Upper Body Dressing 2   Grooming 3   Eating 4   Daily Activity Raw Score 16   Daily Activity Standardized Score (Calc for Raw Score >=11) 35 96   AM-PAC Applied Cognition Inpatient   Following a Speech/Presentation 3   Understanding Ordinary Conversation 3   Taking Medications 1   Remembering Where Things Are Placed or Put Away 2   Remembering List of 4-5 Errands 1   Taking Care of Complicated Tasks 1   Applied Cognition Raw Score 11   Applied Cognition Standardized Score 27 03     Madison Bartlett

## 2022-10-28 NOTE — PROGRESS NOTES
[facility]  Progress Note - Daniele Braun 1970, 45 y o  female MRN: [id number]  Unit/Bed#: [facility] -01 Encounter: [id number]  Primary Care Provider: Ozzie Emanuel MD   Date and time admitted to hospital: 9/19/2022  8:04 AM    * Toxic metabolic encephalopathy  Assessment & Plan  Due to multiple metabolic derangements, home medications morphine and gabapentin in the setting of acute kidney injury  Overall from admission she has improved but is still confused  Patient has been evaluated by Neurology, Gerhard Leonard during prolonged hospital stay  MRI performed 10/14/2022 with development of severe periventricular and white matter hyperintensities without mass effect enhancement or hemorrhage correlation with toxic encephalopathy  Recommendation for 3 month follow-up to ensure resolution  Patient completed antibiotic course  Medically stable for discharge to inpatient rehab          Mild protein-calorie malnutrition ([address] )  Assessment & Plan  Malnutrition Findings:   Adult Malnutrition type: Acute illness  Adult Degree of Malnutrition: Malnutrition of moderate degree  Malnutrition Characteristics: Fat loss, Muscle loss, Inadequate energy, Weight loss                  360 Statement: Acute moderate pro, chelsey malnutrition d/t condition as evidence by mild muscle and fat loss at temples, orbitals, triceps, <75% energy intake > 7 days, 18 1% wt loss x 1 month, BMI 18 4, currently treated with liberalized oral diet, tried supplements, but PT refused, will continue to monitor for food preferences and additional snacks in between meals  BMI Findings:  Adult BMI Classifications: Underweight < 18 5        Body mass index is 18 4 kg/m²         Diarrhea  Assessment & Plan  Reports having multiple loose bowel movements  C diff negative  Continue florastor, Metamucil immodium prn    Pulmonary edema  Assessment & Plan  Developed flash pulmonary edema during hospital stay following aggressive IV resuscitation  Now resolved  Echocardiogram with preserved ejection fraction  Stable off diuretics    Bacteremia due to methicillin susceptible Staphylococcus aureus (MSSA)  Assessment & Plan  MSSA bacteremia, repeat cultures neg for greater than 72 hours  Echo with no vegetation  Appreciate recommendations from ID  Completed abx 10/26/22   Repeat blood cultures 2 week from completion of antibiotics    Traumatic rhabdomyolysis ([facility] )  Assessment & Plan  · Secondary to fall, patient had been laying in bed for approximately 4 days  · She was subsequently brought to the hospital and found to be in acute rhabdomyolysis with ANUJA  · Had been on multiple sedating medications including MS Contin as well as gabapentin, which likely were contributing to sedation  · Now resolved    Transaminitis  Assessment & Plan  · Secondary to rhabdomyolysis  No evidence of liver injury on CT imaging  · Seen by GI  Hepatic duplex also negative  ARF (acute renal failure) (HCC)  Assessment & Plan  · ANUJA/ATN secondary to rhabdomyolysis  No evidence of renal obstruction on imaging    Kidney function has returned to normal      DDD (degenerative disc disease), lumbosacral  Assessment & Plan  · Lumbar radiculopathy with previously scheduled surgery now on hold  Discussed case with patient's outpatient neurosurgeon  Initial surgery was canceled prior to hospitalization  It was decided that patient will need a much larger procedure with multilevel fusion and scoliosis correction  They discussed that surgery would be high risk given prolonged anesthesia time and comorbidities  · Prior to admission was on gabapentin and morphine IR 15 mg  · Gabapentin resumed at lower dose  Will titrate to symptoms  Monitor off morphine      Tobacco abuse  Assessment & Plan  · Nicotine patch ordered    Depression  Assessment & Plan  · Does have history of suicide attempt a currently does not show any signs of thoughts of self-harm  · Psychiatry consultation appreciated  · Continue current medications      VTE Pharmacologic Prophylaxis:  Heparin    Patient Centered Rounds:  Patient care rounds were performed with nursing    Time Spent for Care: 30  More than 50% of total time spent on counseling and coordination of care as described above  Current Length of Stay: 39 day(s)    Current Patient Status: Inpatient   Certification Statement: The patient will continue to require additional inpatient hospital stay due to medically stable, waiting placement    Discharge Plan:  Awaiting placement    Code Status: Level 1 - Full Code      Subjective:   Patient seen evaluated at bedside  No complaints  No overnight events  Objective:     Vitals:   Temp (24hrs), Av 9 °F (36 6 °C), Min:97 8 °F (36 6 °C), Max:97 9 °F (36 6 °C)    Temp:  [97 8 °F (36 6 °C)-97 9 °F (36 6 °C)] 97 9 °F (36 6 °C)  HR:  [102-107] 107  Resp:  [16-18] 18  BP: (112-115)/(73-80) 115/80  SpO2:  [97 %-99 %] 97 %  Body mass index is 18 4 kg/m²  Input and Output Summary (last 24 hours): Intake/Output Summary (Last 24 hours) at 10/28/2022 1255  Last data filed at 10/28/2022 0046  Gross per 24 hour   Intake 450 ml   Output 600 ml   Net -150 ml       Physical Exam:     Physical Exam  Vitals reviewed  Constitutional:       General: She is not in acute distress  Appearance: She is well-developed  She is not ill-appearing, toxic-appearing or diaphoretic  HENT:      Head: Normocephalic and atraumatic  Mouth/Throat:      Mouth: Mucous membranes are moist    Eyes:      General: No scleral icterus  Extraocular Movements: Extraocular movements intact  Cardiovascular:      Rate and Rhythm: Normal rate and regular rhythm  Heart sounds: Normal heart sounds  Pulmonary:      Effort: Pulmonary effort is normal  No respiratory distress  Breath sounds: Normal breath sounds  No wheezing or rales  Abdominal:      General: There is no distension  Palpations: Abdomen is soft  Tenderness: There is no abdominal tenderness  There is no guarding or rebound  Musculoskeletal:         General: No swelling, tenderness or deformity  Skin:     General: Skin is warm and dry  Neurological:      General: No focal deficit present  Mental Status: She is alert  She is disoriented  Additional Data:     Labs:  I have reviewed pertinent results     Results from last 7 days   Lab Units 10/26/22  1049 10/24/22  0530   WBC Thousand/uL 6 37 8 87   HEMOGLOBIN g/dL 10 5* 11 7   HEMATOCRIT % 33 8* 37 8   PLATELETS Thousands/uL 229 310   NEUTROS PCT %  --  66   LYMPHS PCT %  --  26   MONOS PCT %  --  5   EOS PCT %  --  2     Results from last 7 days   Lab Units 10/26/22  1049   SODIUM mmol/L 140   POTASSIUM mmol/L 3 5   CHLORIDE mmol/L 104   CO2 mmol/L 25   BUN mg/dL 12   CREATININE mg/dL 0 75   ANION GAP mmol/L 11   CALCIUM mg/dL 9 1   GLUCOSE RANDOM mg/dL 138                         Imaging: I have reviewed pertinent imaging       Recent Cultures (last 7 days):           Last 24 Hours Medication List:   Current Facility-Administered Medications   Medication Dose Route Frequency Provider Last Rate   • acetaminophen  650 mg Oral Q6H PRN Elizabeth Goode DO     • ALPRAZolam  0 5 mg Oral HS PRN Sarabjit Lofton MD     • amLODIPine  10 mg Oral Daily Nicole Leon MD     • gabapentin  300 mg Oral TID Steve Chen MD     • heparin (porcine)  5,000 Units Subcutaneous [facility] Kusum Adan, DO     • levalbuterol  1 25 mg Nebulization Q4H PRN Muriel Branch DO     • loperamide  2 mg Oral 4x Daily PRN Sarabjit Lofton MD     • metoprolol tartrate  25 mg Oral Q12H [address] Inder Tsang MD     • VANDANA ANTIFUNGAL   Topical BID Virginia Payne, DO     • nicotine  1 patch Transdermal Daily Atul Adan DO     • OLANZapine  5 mg Oral Q6H PRN Muriel Branch DO     • ondansetron  4 mg Intravenous Q4H PRN Elizabeth Goode DO     • psyllium  1 packet Oral Daily Heidi Marino DO     • QUEtiapine  50 mg Oral BID Alvaro Deleon MD     • saccharomyces boulardii  250 mg Oral BID Alvaro Deleon MD     • venlafaxine  187 5 mg Oral Daily Balaji Elizondo DO          Today, Patient Was Seen By: Heidi Marino    ** Please Note: Dictation voice to text software may have been used in the creation of this document   **

## 2022-10-28 NOTE — PLAN OF CARE
Problem: PHYSICAL THERAPY ADULT  Goal: Performs mobility at highest level of function for planned discharge setting  See evaluation for individualized goals  Description: Treatment/Interventions: Functional transfer training, LE strengthening/ROM, Elevations, Therapeutic exercise, Cognitive reorientation, Patient/family training, Equipment eval/education, Bed mobility, Gait training, Compensatory technique education, Continued evaluation, Spoke to nursing, Spoke to case management, Spoke to MD, OT, ST  Equipment Recommended: Juancarlos Casper (if patient does not own)       See flowsheet documentation for full assessment, interventions and recommendations  Outcome: Progressing  Note: Prognosis: Fair  Problem List: Impaired balance, Decreased endurance, Decreased mobility, Decreased cognition, Decreased safety awareness, Impaired judgement, Pain, Decreased strength, Decreased range of motion  Assessment: Pt showing improved participation in PT this session  Pt  showing improved activity tolerance, balance, mobility, endurance  Pt able to performs transfer and gait training this session  Pt demonstrating less ataxic git improved fluency with larger strides and step through gait and improved base of support to WNL  Pt continues to demonstrate mild steppage gait on right, with decreased stance phase and antalgic gait  Pt  Demonstrating improved safety with use of rw with ability to push and steer and maneuver walker in a smooth, fluent fashion  Pt  Demonstrates decreased safety with approach to bed with use of rw by pushing rw aside and reaching for bed rail to steady self with while turning and backing up to bed to sit  Pt demonstrated difficulty in performing hip flexion( marching) with b/l le's, pt unable to follow through despite verbal, tactile and visual cues  Pt  Performs seated arom to b/l le at eob with max verbal and tactile cues    Pt remains functioning below baseline level of mobility and will benefit from STR at d/c to maximize functional outcomes, mobility and independence  Barriers to Discharge: Inaccessible home environment, Decreased caregiver support  Barriers to Discharge Comments: unable to identify at this time  PT Discharge Recommendation: Post acute rehabilitation services    See flowsheet documentation for full assessment

## 2022-10-28 NOTE — UTILIZATION REVIEW
Continued Stay Review    Date: 1/28/22               Current Patient Class: IP Current Level of Care: MS    HPI:52 y o  female initially admitted on 9/19    Assessment/Plan:   Remains confused and on continual observation today  VS stable  Labs WNL  Gabapentin restarted at lower dose  Will titrate to symptoms  She is waiting placement in SNF rehab        Vital Signs:   10/28/22 14:29:13 98 1 °F (36 7 °C) 102 18 116/80 92 99 %   10/27/22 20:58:16 97 9 °F (36 6 °C) 107 Abnormal  18 115/80 92 97 %   10/27/22 15:16:17 97 8 °F (36 6 °C) 102 -- -- -- 98 %   10/27/22 14:51:26 -- 103 -- 112/73 86 99 %   10/27/22 14:50:32 -- -- 16 114/74 87 --   10/27/22 06:58:18 98 3 °F (36 8 °C) -- 17 146/86 106 --   10/26/22 21:48:11 98 6 °F (37 °C) 117 Abnormal  18 106/72 83 97 %   10/26/22 15:03:21 98 2 °F (36 8 °C) 99 18 129/82 98 99 %   10/26/22 07:13:24 -- 103 -- 132/85 101 91 %     Pertinent Labs/Diagnostic Results:       Results from last 7 days   Lab Units 10/26/22  1049 10/24/22  0530   WBC Thousand/uL 6 37 8 87   HEMOGLOBIN g/dL 10 5* 11 7   HEMATOCRIT % 33 8* 37 8   PLATELETS Thousands/uL 229 310   NEUTROS ABS Thousands/µL  --  5 87         Results from last 7 days   Lab Units 10/26/22  1049 10/24/22  0530   SODIUM mmol/L 140 135   POTASSIUM mmol/L 3 5 4 1   CHLORIDE mmol/L 104 100   CO2 mmol/L 25 25   ANION GAP mmol/L 11 10   BUN mg/dL 12 15   CREATININE mg/dL 0 75 0 94   EGFR ml/min/1 73sq m 90 68   CALCIUM mg/dL 9 1 9 5   MAGNESIUM mg/dL  --  1 5*             Results from last 7 days   Lab Units 10/26/22  1049 10/24/22  0530   GLUCOSE RANDOM mg/dL 138 107     Medications:   Scheduled Medications:  amLODIPine, 10 mg, Oral, Daily  gabapentin, 300 mg, Oral, TID  heparin (porcine), 5,000 Units, Subcutaneous, Q8H CH[facility]O  metoprolol tartrate, 25 mg, Oral, Q12H [address]  VANDANA ANTIFUNGAL, , Topical, BID  nicotine, 1 patch, Transdermal, Daily  psyllium, 1 packet, Oral, Daily  QUEtiapine, 50 mg, Oral, BID  saccharomyces boulardii, 250 mg, Oral, BID  venlafaxine, 187 5 mg, Oral, Daily      Continuous IV Infusions:     PRN Meds:  acetaminophen, 650 mg, Oral, Q6H PRN - x 2 10/27, x 1 10/28  ALPRAZolam, 0 5 mg, Oral, HS PRN  levalbuterol, 1 25 mg, Nebulization, Q4H PRN  loperamide, 2 mg, Oral, 4x Daily PRN  OLANZapine, 5 mg, Oral, Q6H PRN  ondansetron, 4 mg, Intravenous, Q4H PRN    Discharge Plan: SNF  Network Utilization Review Department  ATTENTION: Please call with any questions or concerns to [phone] and carefully listen to the prompts so that you are directed to the right person  All voicemails are confidential   Alli Jean all requests for admission clinical reviews, approved or denied determinations and any other requests to dedicated fax number below belonging to the campus where the patient is receiving treatment   List of dedicated fax numbers for the Facilities:  [address] DENIALS (Administrative/Medical Necessity) [phone]   [address] (Maternity/NICU/Pediatrics) [phone]   911 Minda Baldwin [phone]   [facility] 77 [phone]   [address] [place] [address] Wanda Miller 28 [phone]   1554 [facility] [place] [facility] 134 815 [facility] [phone]

## 2022-10-28 NOTE — PLAN OF CARE
Problem: PAIN - ADULT  Goal: Verbalizes/displays adequate comfort level or baseline comfort level  Description: Interventions:  - Encourage patient to monitor pain and request assistance  - Assess pain using appropriate pain scale  - Administer analgesics based on type and severity of pain and evaluate response  - Implement non-pharmacological measures as appropriate and evaluate response  - Consider cultural and social influences on pain and pain management  - Notify physician/advanced practitioner if interventions unsuccessful or patient reports new pain  Outcome: Progressing     Problem: INFECTION - ADULT  Goal: Absence or prevention of progression during hospitalization  Description: INTERVENTIONS:  - Assess and monitor for signs and symptoms of infection  - Monitor lab/diagnostic results  - Monitor all insertion sites, i e  indwelling lines, tubes, and drains  - Monitor endotracheal if appropriate and nasal secretions for changes in amount and color  - [place] appropriate cooling/warming therapies per order  - Administer medications as ordered  - Instruct and encourage patient and family to use good hand hygiene technique  - Identify and instruct in appropriate isolation precautions for identified infection/condition  Outcome: Progressing     Problem: SAFETY ADULT  Goal: Patient will remain free of falls  Description: INTERVENTIONS:  - Educate patient/family on patient safety including physical limitations  - Instruct patient to call for assistance with activity   - Consult OT/PT to assist with strengthening/mobility   - Keep Call bell within reach  - Keep bed low and locked with side rails adjusted as appropriate  - Keep care items and personal belongings within reach  - Initiate and maintain comfort rounds  - Make Fall Risk Sign visible to staff  - Offer Toileting every 2 Hours, in advance of need  - Initiate/Maintain bed alarm  - Obtain necessary fall risk management equipment: walker  - Apply yellow socks and bracelet for high fall risk patients  - Consider moving patient to room near nurses station  Outcome: Progressing  Goal: Maintain or return to baseline ADL function  Description: INTERVENTIONS:  -  Assess patient's ability to carry out ADLs; assess patient's baseline for ADL function and identify physical deficits which impact ability to perform ADLs (bathing, care of mouth/teeth, toileting, grooming, dressing, etc )  - Assess/evaluate cause of self-care deficits   - Assess range of motion  - Assess patient's mobility; develop plan if impaired  - Assess patient's need for assistive devices and provide as appropriate  - Encourage maximum independence but intervene and supervise when necessary  - Involve family in performance of ADLs  - Assess for home care needs following discharge   - Consider OT consult to assist with ADL evaluation and planning for discharge  - Provide patient education as appropriate  Outcome: Progressing  Goal: Maintains/Returns to pre admission functional level  Description: INTERVENTIONS:  - Perform BMAT or MOVE assessment daily    - Set and communicate daily mobility goal to care team and patient/family/caregiver  - Collaborate with rehabilitation services on mobility goals if consulted  - Perform Range of Motion 4-6 times a day  - Reposition patient every 2 hours    - Dangle patient 3-4 times a day  - Stand patient 3 times a day  - Ambulate patient 3-4 times a day  - Out of bed for toileting  - Record patient progress and toleration of activity level   Outcome: Progressing     Problem: DISCHARGE PLANNING  Goal: Discharge to home or other facility with appropriate resources  Description: INTERVENTIONS:  - Identify barriers to discharge w/patient and caregiver  - Arrange for needed discharge resources and transportation as appropriate  - Identify discharge learning needs (meds, wound care, etc )  - Arrange for interpretive services to assist at discharge as needed  - Refer to Case Management Department for coordinating discharge planning if the patient needs post-hospital services based on physician/advanced practitioner order or complex needs related to functional status, cognitive ability, or social support system  Outcome: Progressing     Problem: Knowledge Deficit  Goal: Patient/family/caregiver demonstrates understanding of disease process, treatment plan, medications, and discharge instructions  Description: Complete learning assessment and assess knowledge base    Interventions:  - Provide teaching at level of understanding  - Provide teaching via preferred learning methods  Outcome: Progressing     Problem: Potential for Falls  Goal: Patient will remain free of falls  Description: INTERVENTIONS:  - Educate patient/family on patient safety including physical limitations  - Instruct patient to call for assistance with activity   - Consult OT/PT to assist with strengthening/mobility   - Keep Call bell within reach  - Keep bed low and locked with side rails adjusted as appropriate  - Keep care items and personal belongings within reach  - Initiate and maintain comfort rounds  - Make Fall Risk Sign visible to staff  - Offer Toileting every 2 Hours, in advance of need  - Initiate/Maintain bed alarm  - Obtain necessary fall risk management equipment: walker  - Apply yellow socks and bracelet for high fall risk patients  - Consider moving patient to room near nurses station  Outcome: Progressing     Problem: MOBILITY - ADULT  Goal: Maintain or return to baseline ADL function  Description: INTERVENTIONS:  -  Assess patient's ability to carry out ADLs; assess patient's baseline for ADL function and identify physical deficits which impact ability to perform ADLs (bathing, care of mouth/teeth, toileting, grooming, dressing, etc )  - Assess/evaluate cause of self-care deficits   - Assess range of motion  - Assess patient's mobility; develop plan if impaired  - Assess patient's need for assistive devices and provide as appropriate  - Encourage maximum independence but intervene and supervise when necessary  - Involve family in performance of ADLs  - Assess for home care needs following discharge   - Consider OT consult to assist with ADL evaluation and planning for discharge  - Provide patient education as appropriate  Outcome: Progressing  Goal: Maintains/Returns to pre admission functional level  Description: INTERVENTIONS:  - Perform BMAT or MOVE assessment daily    - Set and communicate daily mobility goal to care team and patient/family/caregiver  - Collaborate with rehabilitation services on mobility goals if consulted  - Perform Range of Motion 4-6 times a day  - Reposition patient every 2 hours  - Dangle patient 3-4 times a day  - Stand patient 3 times a day  - Ambulate patient 3-4 times a day  - Out of bed for toileting  - Record patient progress and toleration of activity level   Outcome: Progressing     Problem: Prexisting or High Potential for Compromised Skin Integrity  Goal: Skin integrity is maintained or improved  Description: INTERVENTIONS:  - Identify patients at risk for skin breakdown  - Assess and monitor skin integrity  - Assess and monitor nutrition and hydration status  - Monitor labs   - Assess for incontinence   - Turn and reposition patient  - Assist with mobility/ambulation  - Relieve pressure over bony prominences  - Avoid friction and shearing  - Provide appropriate hygiene as needed including keeping skin clean and dry  - Evaluate need for skin moisturizer/barrier cream  - Collaborate with interdisciplinary team   - Patient/family teaching  - Consider wound care consult   Outcome: Progressing     Problem: Nutrition/Hydration-ADULT  Goal: Nutrient/Hydration intake appropriate for improving, restoring or maintaining nutritional needs  Description: Monitor and assess patient's nutrition/hydration status for malnutrition   Collaborate with interdisciplinary team and initiate plan and interventions as ordered  Monitor patient's weight and dietary intake as ordered or per policy  Utilize nutrition screening tool and intervene as necessary  Determine patient's food preferences and provide high-protein, high-caloric foods as appropriate  INTERVENTIONS:  - Monitor oral intake, urinary output, labs, and treatment plans  - Assess nutrition and hydration status and recommend course of action  - Evaluate amount of meals eaten  - Assist patient with eating if necessary   - Allow adequate time for meals  - Recommend/ encourage appropriate diets, oral nutritional supplements, and vitamin/mineral supplements  - Order, calculate, and assess calorie counts as needed  - Recommend, monitor, and adjust tube feedings and TPN/PPN based on assessed needs  - Assess need for intravenous fluids  - Provide specific nutrition/hydration education as appropriate  - Include patient/family/caregiver in decisions related to nutrition  Outcome: Progressing     Problem: NEUROSENSORY - ADULT  Goal: Achieves maximal functionality and self care  Description: INTERVENTIONS  - Monitor swallowing and airway patency with patient fatigue and changes in neurological status  - Encourage and assist patient to increase activity and self care     - Encourage visually impaired, hearing impaired and aphasic patients to use assistive/communication devices  Outcome: Progressing  Goal: Achieves stable or improved neurological status  Description: INTERVENTIONS  - Monitor and report changes in neurological status  - Monitor vital signs such as temperature, blood pressure, glucose, and any other labs ordered   - Initiate measures to prevent increased intracranial pressure  - Monitor for seizure activity and implement precautions if appropriate      Outcome: Progressing     Problem: CARDIOVASCULAR - ADULT  Goal: Maintains optimal cardiac output and hemodynamic stability  Description: INTERVENTIONS:  - Monitor I/O, vital signs and rhythm  - Monitor for S/S and trends of decreased cardiac output  - Administer and titrate ordered vasoactive medications to optimize hemodynamic stability  - Assess quality of pulses, skin color and temperature  - Assess for signs of decreased coronary artery perfusion  - Instruct patient to report change in severity of symptoms  Outcome: Progressing     Problem: RESPIRATORY - ADULT  Goal: Achieves optimal ventilation and oxygenation  Description: INTERVENTIONS:  - Assess for changes in respiratory status  - Assess for changes in mentation and behavior  - Position to facilitate oxygenation and minimize respiratory effort  - Oxygen administered by appropriate delivery if ordered  - Initiate smoking cessation education as indicated  - Encourage broncho-pulmonary hygiene including cough, deep breathe, Incentive Spirometry  - Assess the need for suctioning and aspirate as needed  - Assess and instruct to report SOB or any respiratory difficulty  - Respiratory Therapy support as indicated  Outcome: Progressing     Problem: GASTROINTESTINAL - ADULT  Goal: Maintains adequate nutritional intake  Description: INTERVENTIONS:  - Monitor percentage of each meal consumed  - Identify factors contributing to decreased intake, treat as appropriate  - Assist with meals as needed  - Monitor I&O, weight, and lab values if indicated  - Obtain nutrition services referral as needed  Outcome: Progressing     Problem: METABOLIC, FLUID AND ELECTROLYTES - ADULT  Goal: Electrolytes maintained within normal limits  Description: INTERVENTIONS:  - Monitor labs and assess patient for signs and symptoms of electrolyte imbalances  - Administer electrolyte replacement as ordered  - Monitor response to electrolyte replacements, including repeat lab results as appropriate  - Instruct patient on fluid and nutrition as appropriate  Outcome: Progressing  Goal: Fluid balance maintained  Description: INTERVENTIONS:  - Monitor labs   - Monitor I/O and WT  - Instruct patient on fluid and nutrition as appropriate  - Assess for signs & symptoms of volume excess or deficit  Outcome: Progressing     Problem: SKIN/TISSUE INTEGRITY - ADULT  Goal: Skin Integrity remains intact(Skin Breakdown Prevention)  Description: Assess:  -Perform Erickson assessment every shift  -Clean and moisturize skin every day  -Inspect skin when repositioning, toileting, and assisting with ADLS  -Assess extremities for adequate circulation and sensation     Bed Management:  -Have minimal linens on bed & keep smooth, unwrinkled  -Change linens as needed when moist or perspiring  -Avoid sitting or lying in one position for more than 2 hours while in bed  -Keep HOB at 45 degrees     Toileting:  -Offer bedside commode  -Assess for incontinence every 2 hours  -Use incontinent care products after each incontinent episode such as brief    Activity:  -Mobilize patient 3-4 times a day  -Encourage activity and walks on unit  -Encourage or provide ROM exercises   -Turn and reposition patient every 2 Hours  -Use appropriate equipment to lift or move patient in bed  -Instruct/ Assist with weight shifting every 2 when out of bed in chair  -Consider limitation of chair time 2 hour intervals    Skin Care:  -Avoid use of baby powder, tape, friction and shearing, hot water or constrictive clothing  -Relieve pressure over bony prominences using pillows  -Do not massage red bony areas    Next Steps:  -Teach patient strategies to minimize risks such as walker  -Consider consults to  interdisciplinary teams such as wound  Outcome: Progressing  Goal: Incision(s), wounds(s) or drain site(s) healing without S/S of infection  Description: INTERVENTIONS  - Assess and document dressing, incision, wound bed, drain sites and surrounding tissue  - Provide patient and family education  - Perform skin care/dressing changes every PRN  Outcome: Progressing     Problem: MUSCULOSKELETAL - ADULT  Goal: Maintain or return mobility to safest level of function  Description: INTERVENTIONS:  - Assess patient's ability to carry out ADLs; assess patient's baseline for ADL function and identify physical deficits which impact ability to perform ADLs (bathing, care of mouth/teeth, toileting, grooming, dressing, etc )  - Assess/evaluate cause of self-care deficits   - Assess range of motion  - Assess patient's mobility  - Assess patient's need for assistive devices and provide as appropriate  - Encourage maximum independence but intervene and supervise when necessary  - Involve family in performance of ADLs  - Assess for home care needs following discharge   - Consider OT consult to assist with ADL evaluation and planning for discharge  - Provide patient education as appropriate  Outcome: Progressing

## 2022-10-28 NOTE — PLAN OF CARE
Problem: OCCUPATIONAL THERAPY ADULT  Goal: Performs self-care activities at highest level of function for planned discharge setting  See evaluation for individualized goals  Description: Treatment Interventions: ADL retraining, Functional transfer training, UE strengthening/ROM, Endurance training, Patient/family training, Equipment evaluation/education, Neuromuscular reeducation, Fine motor coordination activities, Compensatory technique education, Continued evaluation, Energy conservation, Activityengagement, Cognitive reorientation          See flowsheet documentation for full assessment, interventions and recommendations  Outcome: Progressing  Note: Limitation: Decreased ADL status, Decreased Safe judgement during ADL, Decreased cognition, Decreased endurance, Decreased high-level ADLs     Assessment: Patient participated in skilled OT session this date with interventions consisting of therapeutic activities, therapeutic exercises, and self-care/ADL training to increase B UE strength, functional endurance/activity tolerance, static/dynamic sitting/standing balance, and overall safety awareness to increase (I) with ADLs/IADLs to return to PLOF  Provided education on energy conservation techniques, deep breathing techniques, fall prevention strategies, and overall safety awareness  Patient agreeable to OT treatment session, upon arrival patient was found supine in bed  Patient requiring verbal cues for safety and verbal cues for pacing through activity steps  Pt requiring encouragement today for participation  Patient continues to be functioning below baseline level, occupational performance remains limited secondary to factors listed above and increased risk for falls and injury  The patient's raw score on the AM-PAC Daily Activity inpatient short form is 16, standardized score is 35 96, less than 39 4  Patients at this level are likely to benefit from discharge to post-acute rehabilitation services  Please refer to the recommendation of the Occupational Therapist for safe discharge planning       OT Discharge Recommendation: Post acute rehabilitation services

## 2022-10-28 NOTE — PLAN OF CARE
Problem: PAIN - ADULT  Goal: Verbalizes/displays adequate comfort level or baseline comfort level  Description: Interventions:  - Encourage patient to monitor pain and request assistance  - Assess pain using appropriate pain scale  - Administer analgesics based on type and severity of pain and evaluate response  - Implement non-pharmacological measures as appropriate and evaluate response  - Consider cultural and social influences on pain and pain management  - Notify physician/advanced practitioner if interventions unsuccessful or patient reports new pain  Outcome: Progressing     Problem: INFECTION - ADULT  Goal: Absence or prevention of progression during hospitalization  Description: INTERVENTIONS:  - Assess and monitor for signs and symptoms of infection  - Monitor lab/diagnostic results  - Monitor all insertion sites, i e  indwelling lines, tubes, and drains  - Monitor endotracheal if appropriate and nasal secretions for changes in amount and color  - [place] appropriate cooling/warming therapies per order  - Administer medications as ordered  - Instruct and encourage patient and family to use good hand hygiene technique  - Identify and instruct in appropriate isolation precautions for identified infection/condition  Outcome: Progressing     Problem: SAFETY ADULT  Goal: Patient will remain free of falls  Description: INTERVENTIONS:  - Educate patient/family on patient safety including physical limitations  - Instruct patient to call for assistance with activity   - Consult OT/PT to assist with strengthening/mobility   - Keep Call bell within reach  - Keep bed low and locked with side rails adjusted as appropriate  - Keep care items and personal belongings within reach  - Initiate and maintain comfort rounds  - Make Fall Risk Sign visible to staff  - Offer Toileting every 2 Hours, in advance of need  - Initiate/Maintain bed alarm  - Obtain necessary fall risk management equipment: walker  - Apply yellow socks and bracelet for high fall risk patients  - Consider moving patient to room near nurses station  Outcome: Progressing  Goal: Maintain or return to baseline ADL function  Description: INTERVENTIONS:  -  Assess patient's ability to carry out ADLs; assess patient's baseline for ADL function and identify physical deficits which impact ability to perform ADLs (bathing, care of mouth/teeth, toileting, grooming, dressing, etc )  - Assess/evaluate cause of self-care deficits   - Assess range of motion  - Assess patient's mobility; develop plan if impaired  - Assess patient's need for assistive devices and provide as appropriate  - Encourage maximum independence but intervene and supervise when necessary  - Involve family in performance of ADLs  - Assess for home care needs following discharge   - Consider OT consult to assist with ADL evaluation and planning for discharge  - Provide patient education as appropriate  Outcome: Progressing  Goal: Maintains/Returns to pre admission functional level  Description: INTERVENTIONS:  - Perform BMAT or MOVE assessment daily    - Set and communicate daily mobility goal to care team and patient/family/caregiver  - Collaborate with rehabilitation services on mobility goals if consulted  - Perform Range of Motion 4-6 times a day  - Reposition patient every 2 hours    - Dangle patient 3-4 times a day  - Stand patient 3 times a day  - Ambulate patient 3-4 times a day  - Out of bed for toileting  - Record patient progress and toleration of activity level   Outcome: Progressing     Problem: DISCHARGE PLANNING  Goal: Discharge to home or other facility with appropriate resources  Description: INTERVENTIONS:  - Identify barriers to discharge w/patient and caregiver  - Arrange for needed discharge resources and transportation as appropriate  - Identify discharge learning needs (meds, wound care, etc )  - Arrange for interpretive services to assist at discharge as needed  - Refer to Case Management Department for coordinating discharge planning if the patient needs post-hospital services based on physician/advanced practitioner order or complex needs related to functional status, cognitive ability, or social support system  Outcome: Progressing     Problem: Knowledge Deficit  Goal: Patient/family/caregiver demonstrates understanding of disease process, treatment plan, medications, and discharge instructions  Description: Complete learning assessment and assess knowledge base    Interventions:  - Provide teaching at level of understanding  - Provide teaching via preferred learning methods  Outcome: Progressing     Problem: Potential for Falls  Goal: Patient will remain free of falls  Description: INTERVENTIONS:  - Educate patient/family on patient safety including physical limitations  - Instruct patient to call for assistance with activity   - Consult OT/PT to assist with strengthening/mobility   - Keep Call bell within reach  - Keep bed low and locked with side rails adjusted as appropriate  - Keep care items and personal belongings within reach  - Initiate and maintain comfort rounds  - Make Fall Risk Sign visible to staff  - Offer Toileting every 2 Hours, in advance of need  - Initiate/Maintain bed alarm  - Obtain necessary fall risk management equipment: walker  - Apply yellow socks and bracelet for high fall risk patients  - Consider moving patient to room near nurses station  Outcome: Progressing     Problem: MOBILITY - ADULT  Goal: Maintain or return to baseline ADL function  Description: INTERVENTIONS:  -  Assess patient's ability to carry out ADLs; assess patient's baseline for ADL function and identify physical deficits which impact ability to perform ADLs (bathing, care of mouth/teeth, toileting, grooming, dressing, etc )  - Assess/evaluate cause of self-care deficits   - Assess range of motion  - Assess patient's mobility; develop plan if impaired  - Assess patient's need for assistive devices and provide as appropriate  - Encourage maximum independence but intervene and supervise when necessary  - Involve family in performance of ADLs  - Assess for home care needs following discharge   - Consider OT consult to assist with ADL evaluation and planning for discharge  - Provide patient education as appropriate  Outcome: Progressing  Goal: Maintains/Returns to pre admission functional level  Description: INTERVENTIONS:  - Perform BMAT or MOVE assessment daily    - Set and communicate daily mobility goal to care team and patient/family/caregiver  - Collaborate with rehabilitation services on mobility goals if consulted  - Perform Range of Motion 4-6 times a day  - Reposition patient every 2 hours  - Dangle patient 3-4 times a day  - Stand patient 3 times a day  - Ambulate patient 3-4 times a day  - Out of bed for toileting  - Record patient progress and toleration of activity level   Outcome: Progressing     Problem: Prexisting or High Potential for Compromised Skin Integrity  Goal: Skin integrity is maintained or improved  Description: INTERVENTIONS:  - Identify patients at risk for skin breakdown  - Assess and monitor skin integrity  - Assess and monitor nutrition and hydration status  - Monitor labs   - Assess for incontinence   - Turn and reposition patient  - Assist with mobility/ambulation  - Relieve pressure over bony prominences  - Avoid friction and shearing  - Provide appropriate hygiene as needed including keeping skin clean and dry  - Evaluate need for skin moisturizer/barrier cream  - Collaborate with interdisciplinary team   - Patient/family teaching  - Consider wound care consult   Outcome: Progressing     Problem: Nutrition/Hydration-ADULT  Goal: Nutrient/Hydration intake appropriate for improving, restoring or maintaining nutritional needs  Description: Monitor and assess patient's nutrition/hydration status for malnutrition   Collaborate with interdisciplinary team and initiate plan and interventions as ordered  Monitor patient's weight and dietary intake as ordered or per policy  Utilize nutrition screening tool and intervene as necessary  Determine patient's food preferences and provide high-protein, high-caloric foods as appropriate  INTERVENTIONS:  - Monitor oral intake, urinary output, labs, and treatment plans  - Assess nutrition and hydration status and recommend course of action  - Evaluate amount of meals eaten  - Assist patient with eating if necessary   - Allow adequate time for meals  - Recommend/ encourage appropriate diets, oral nutritional supplements, and vitamin/mineral supplements  - Order, calculate, and assess calorie counts as needed  - Recommend, monitor, and adjust tube feedings and TPN/PPN based on assessed needs  - Assess need for intravenous fluids  - Provide specific nutrition/hydration education as appropriate  - Include patient/family/caregiver in decisions related to nutrition  Outcome: Progressing     Problem: NEUROSENSORY - ADULT  Goal: Achieves maximal functionality and self care  Description: INTERVENTIONS  - Monitor swallowing and airway patency with patient fatigue and changes in neurological status  - Encourage and assist patient to increase activity and self care     - Encourage visually impaired, hearing impaired and aphasic patients to use assistive/communication devices  Outcome: Progressing  Goal: Achieves stable or improved neurological status  Description: INTERVENTIONS  - Monitor and report changes in neurological status  - Monitor vital signs such as temperature, blood pressure, glucose, and any other labs ordered   - Initiate measures to prevent increased intracranial pressure  - Monitor for seizure activity and implement precautions if appropriate      Outcome: Progressing     Problem: CARDIOVASCULAR - ADULT  Goal: Maintains optimal cardiac output and hemodynamic stability  Description: INTERVENTIONS:  - Monitor I/O, vital signs and rhythm  - Monitor for S/S and trends of decreased cardiac output  - Administer and titrate ordered vasoactive medications to optimize hemodynamic stability  - Assess quality of pulses, skin color and temperature  - Assess for signs of decreased coronary artery perfusion  - Instruct patient to report change in severity of symptoms  Outcome: Progressing     Problem: RESPIRATORY - ADULT  Goal: Achieves optimal ventilation and oxygenation  Description: INTERVENTIONS:  - Assess for changes in respiratory status  - Assess for changes in mentation and behavior  - Position to facilitate oxygenation and minimize respiratory effort  - Oxygen administered by appropriate delivery if ordered  - Initiate smoking cessation education as indicated  - Encourage broncho-pulmonary hygiene including cough, deep breathe, Incentive Spirometry  - Assess the need for suctioning and aspirate as needed  - Assess and instruct to report SOB or any respiratory difficulty  - Respiratory Therapy support as indicated  Outcome: Progressing     Problem: GASTROINTESTINAL - ADULT  Goal: Maintains adequate nutritional intake  Description: INTERVENTIONS:  - Monitor percentage of each meal consumed  - Identify factors contributing to decreased intake, treat as appropriate  - Assist with meals as needed  - Monitor I&O, weight, and lab values if indicated  - Obtain nutrition services referral as needed  Outcome: Progressing     Problem: METABOLIC, FLUID AND ELECTROLYTES - ADULT  Goal: Electrolytes maintained within normal limits  Description: INTERVENTIONS:  - Monitor labs and assess patient for signs and symptoms of electrolyte imbalances  - Administer electrolyte replacement as ordered  - Monitor response to electrolyte replacements, including repeat lab results as appropriate  - Instruct patient on fluid and nutrition as appropriate  Outcome: Progressing  Goal: Fluid balance maintained  Description: INTERVENTIONS:  - Monitor labs   - Monitor I/O and WT  - Instruct patient on fluid and nutrition as appropriate  - Assess for signs & symptoms of volume excess or deficit  Outcome: Progressing     Problem: SKIN/TISSUE INTEGRITY - ADULT  Goal: Skin Integrity remains intact(Skin Breakdown Prevention)  Description: Assess:  -Perform Erickson assessment every shift  -Clean and moisturize skin every day  -Inspect skin when repositioning, toileting, and assisting with ADLS  -Assess extremities for adequate circulation and sensation     Bed Management:  -Have minimal linens on bed & keep smooth, unwrinkled  -Change linens as needed when moist or perspiring  -Avoid sitting or lying in one position for more than 2 hours while in bed  -Keep HOB at 45 degrees     Toileting:  -Offer bedside commode  -Assess for incontinence every 2 hours  -Use incontinent care products after each incontinent episode such as brief    Activity:  -Mobilize patient 3-4 times a day  -Encourage activity and walks on unit  -Encourage or provide ROM exercises   -Turn and reposition patient every 2 Hours  -Use appropriate equipment to lift or move patient in bed  -Instruct/ Assist with weight shifting every 2 when out of bed in chair  -Consider limitation of chair time 2 hour intervals    Skin Care:  -Avoid use of baby powder, tape, friction and shearing, hot water or constrictive clothing  -Relieve pressure over bony prominences using pillows  -Do not massage red bony areas    Next Steps:  -Teach patient strategies to minimize risks such as walker  -Consider consults to  interdisciplinary teams such as wound  Outcome: Progressing  Goal: Incision(s), wounds(s) or drain site(s) healing without S/S of infection  Description: INTERVENTIONS  - Assess and document dressing, incision, wound bed, drain sites and surrounding tissue  - Provide patient and family education  - Perform skin care/dressing changes every PRN  Outcome: Progressing     Problem: MUSCULOSKELETAL - ADULT  Goal: Maintain or return mobility to safest level of function  Description: INTERVENTIONS:  - Assess patient's ability to carry out ADLs; assess patient's baseline for ADL function and identify physical deficits which impact ability to perform ADLs (bathing, care of mouth/teeth, toileting, grooming, dressing, etc )  - Assess/evaluate cause of self-care deficits   - Assess range of motion  - Assess patient's mobility  - Assess patient's need for assistive devices and provide as appropriate  - Encourage maximum independence but intervene and supervise when necessary  - Involve family in performance of ADLs  - Assess for home care needs following discharge   - Consider OT consult to assist with ADL evaluation and planning for discharge  - Provide patient education as appropriate  Outcome: Progressing

## 2022-10-28 NOTE — PHYSICAL THERAPY NOTE
PHYSICAL THERAPY NOTE          Patient Name: Mary Overton  [id number]'[id number] Date: 10/28/2022    10/28/22 1045   Note Type   Note Type Treatment   Pain Assessment   Pain Assessment Tool 0-10   Pain Score 8   Pain Location/Orientation Orientation: Right;[place] [facility]   Hospital Pain Intervention(s) Repositioned; Ambulation/increased activity; Rest;Emotional support   Restrictions/Precautions   Other Precautions 1:1;Chair Alarm; Bed Alarm;Pain; Fall Risk;Cognitive   General   Chart Reviewed Yes   Cognition   Overall Cognitive Status Unable to assess   Arousal/Participation Responsive; Alert   Attention Attends with cues to redirect   Orientation Level Oriented to person;Disoriented to situation;Disoriented to time;Disoriented to place  (oriented to day, not month,year or date, when given choices of places pt able to choosed correct place but not correct name )   Memory Decreased recall of precautions   Subjective   Subjective I'm tired  pt reports pain in her R foot  Bed Mobility   Supine to Sit 5  Supervision   Additional items Assist x 1;HOB elevated; Bedrails; Increased time required   Sit to Supine 5  Supervision   Additional items Assist x 1;HOB elevated; Increased time required   Transfers   Sit to Stand 5  Supervision   Additional items Assist x 1;Bedrails; Increased time required;Verbal cues   Stand to Sit 4  Minimal assistance   Additional items Assist x 1; Increased time required;Verbal cues; Bedrails; Impulsive  (mni due to safety,  pt pushed walker aside upon approach to bed reaching for bed rail to steady self with in order to turn and sit at EOB  )   Ambulation/Elevation   Gait pattern Steppage; Antalgic;Decreased R stance; Step through pattern; Inconsistent yan;Decreased heel strike;Decreased toe off  (hip flexion to clear r foot )   Gait Assistance 4  Minimal assist   Additional items Assist x 1;Verbal cues   Assistive Device Rolling walker   Distance 70' x2   Balance   Static Sitting Good   Dynamic Sitting Fair   Static Standing Fair  (w rw)   Dynamic Standing Poor +   Ambulatory Poor +   Exercises   Hip Abduction Sitting;15 reps;AROM; Bilateral   Hip Adduction Sitting;15 reps;Bilateral  (isometric hip add )   Knee AROM Long Arc [employer]; Bilateral;15 reps   Ankle Pumps Sitting;15 reps;AROM; Left  (prom R ankle DFx 10 reps)   Assessment   Prognosis Fair   Problem List Impaired balance;Decreased endurance;Decreased mobility; Decreased cognition;Decreased safety awareness; Impaired judgement;Pain;Decreased strength;Decreased range of motion   Assessment Pt showing improved participation in PT this session  Pt  showing improved activity tolerance, balance, mobility, endurance  Pt able to performs transfer and gait training this session  Pt demonstrating less ataxic git improved fluency with larger strides and step through gait and improved base of support to WNL  Pt continues to demonstrate mild steppage gait on right, with decreased stance phase and antalgic gait  Pt  Demonstrating improved safety with use of rw with ability to push and steer and maneuver walker in a smooth, fluent fashion  Pt  Demonstrates decreased safety with approach to bed with use of rw by pushing rw aside and reaching for bed rail to steady self with while turning and backing up to bed to sit  Pt demonstrated difficulty in performing hip flexion( marching) with b/l le's, pt unable to follow through despite verbal, tactile and visual cues  Pt  Performs seated arom to b/l le at eob with max verbal and tactile cues  Pt remains functioning below baseline level of mobility and will benefit from STR at d/c to maximize functional outcomes, mobility and independence  Goals   Patient Goals to be able to walk  STG Expiration Date 11/02/22   PT Treatment Day 13   Plan   Treatment/Interventions Functional transfer training;LE strengthening/ROM; Therapeutic exercise; Endurance training;Cognitive reorientation;Patient/family training;Equipment eval/education; Bed mobility;Gait training;Spoke to nursing   Progress Slow progress, cognitive deficits   PT Frequency Other (Comment)  (2-4x/ week)   Recommendation   PT Discharge Recommendation Post acute rehabilitation services   AM-PAC Basic Mobility Inpatient   Turning in Bed Without Bedrails 3   Lying on Back to Sitting on Edge of Flat Bed 3   Moving Bed to Chair 3   Standing Up From Chair 3   Walk in Room 3   Climb 3-5 Stairs 3   Basic Mobility Inpatient Raw Score 18   Basic Mobility Standardized Score 41 05   Highest Level Of Mobility   JH-HLM Goal 6: Walk 10 steps or more   JH-HLM Achieved 7: Walk 25 feet or more   Education   Education Provided Mobility training;Home exercise program;Assistive device   Patient Reinforcement needed   End of Consult   Patient Position at End of Consult Supine; All needs within reach   End of Consult Comments 1:1 present      10/28/22 6771   Note Type   Note Type Treatment   Pain Assessment   Pain Assessment Tool 0-10   Pain Score 8   Pain Location/Orientation Orientation: Right;[place] [place]   Hospital Pain Intervention(s) Repositioned; Ambulation/increased activity; Rest;Emotional support   Restrictions/Precautions   Other Precautions 1:1;Chair Alarm; Bed Alarm;Pain; Fall Risk;Cognitive   General   Chart Reviewed Yes   Cognition   Overall Cognitive Status Unable to assess   Arousal/Participation Responsive; Alert   Attention Attends with cues to redirect   Orientation Level Oriented to person;Disoriented to situation;Disoriented to time;Disoriented to place  (oriented to day, not month,year or date, when given choices of places pt able to choosed correct place but not correct name )   Memory Decreased recall of precautions   Subjective   Subjective I'm tired  pt reports pain in her R foot     Bed Mobility   Supine to Sit 5  Supervision   Additional items Assist x 1;HOB elevated; Bedrails; Increased time required   Sit to Supine 5  Supervision   Additional items Assist x 1;HOB elevated; Increased time required   Transfers   Sit to Stand 5  Supervision   Additional items Assist x 1;Bedrails; Increased time required;Verbal cues   Stand to Sit 4  Minimal assistance   Additional items Assist x 1; Increased time required;Verbal cues; Bedrails; Impulsive  (mni due to safety,  pt pushed walker aside upon approach to bed reaching for bed rail to steady self with in order to turn and sit at EOB  )   Ambulation/Elevation   Gait pattern Steppage; Antalgic;Decreased R stance; Step through pattern; Inconsistent yan;Decreased heel strike;Decreased toe off  (hip flexion to clear r foot )   Gait Assistance 4  Minimal assist   Additional items Assist x 1;Verbal cues   Assistive Device Rolling walker   Distance 70' x2   Balance   Static Sitting Good   Dynamic Sitting Fair   Static Standing Fair  (w rw)   Dynamic Standing Poor +   Ambulatory Poor +   Exercises   Hip Abduction Sitting;15 reps;AROM; Bilateral   Hip Adduction Sitting;15 reps;Bilateral  (isometric hip add )   Knee AROM Long Arc [employer]; Bilateral;15 reps   Ankle Pumps Sitting;15 reps;AROM; Left  (prom R ankle DFx 10 reps)   Assessment   Prognosis Fair   Problem List Impaired balance;Decreased endurance;Decreased mobility; Decreased cognition;Decreased safety awareness; Impaired judgement;Pain;Decreased strength;Decreased range of motion   Assessment Pt showing improved participation in PT this session  Pt  showing improved activity tolerance, balance, mobility, endurance  Pt able to performs transfer and gait training this session  Pt demonstrating less ataxic git improved fluency with larger strides and step through gait and improved base of support to WNL  Pt continues to demonstrate mild steppage gait on right, with decreased stance phase and antalgic gait    Pt  Demonstrating improved safety with use of rw with ability to push and steer and maneuver walker in a smooth, fluent fashion  Pt  Demonstrates decreased safety with approach to bed with use of rw by pushing rw aside and reaching for bed rail to steady self with while turning and backing up to bed to sit  Pt demonstrated difficulty in performing hip flexion( marching) with b/l le's, pt unable to follow through despite verbal, tactile and visual cues  Pt  Performs seated arom to b/l le at eob with max verbal and tactile cues  Pt remains functioning below baseline level of mobility and will benefit from STR at d/c to maximize functional outcomes, mobility and independence  Goals   Patient Goals to be able to walk  STG Expiration Date 11/02/22   PT Treatment Day 13   Plan   Treatment/Interventions Functional transfer training;LE strengthening/ROM; Therapeutic exercise; Endurance training;Cognitive reorientation;Patient/family training;Equipment eval/education; Bed mobility;Gait training;Spoke to nursing   Progress Slow progress, cognitive deficits   PT Frequency Other (Comment)  (2-4x/ week)   Recommendation   PT Discharge Recommendation Post acute rehabilitation services   AM-PAC Basic Mobility Inpatient   Turning in Bed Without Bedrails 3   Lying on Back to Sitting on Edge of Flat Bed 3   Moving Bed to Chair 3   Standing Up From Chair 3   Walk in Room 3   Climb 3-5 Stairs 3   Basic Mobility Inpatient Raw Score 18   Basic Mobility Standardized Score 41 05   Highest Level Of Mobility   JH-HLM Goal 6: Walk 10 steps or more   JH-HLM Achieved 7: Walk 25 feet or more   Education   Education Provided Mobility training;Home exercise program;Assistive device   Patient Reinforcement needed   End of Consult   Patient Position at End of Consult Supine; All needs within reach   End of Consult Comments 1:1 present   Fay Beckwith

## 2022-10-28 NOTE — ASSESSMENT & PLAN NOTE
MSSA bacteremia, repeat cultures neg for greater than 72 hours  Echo with no vegetation  Appreciate recommendations from ID  Completed abx 10/26/22   Repeat blood cultures 2 week from completion of antibiotics

## 2022-10-28 NOTE — ASSESSMENT & PLAN NOTE
Developed flash pulmonary edema during hospital stay following aggressive IV resuscitation  Now resolved  Echocardiogram with preserved ejection fraction  Stable off diuretics

## 2022-10-28 NOTE — CASE MANAGEMENT
Case Management Discharge Planning Note    Patient name Isra Bourgeois 68 2 /[address] Petra Phillips* MRN [id number]  : 1970 Date 10/28/2022       Current Admission Date: 2022  Current Admission Diagnosis:Toxic metabolic encephalopathy   Patient Active Problem List    Diagnosis Date Noted   • Moderate protein-calorie malnutrition ([address] ) 10/25/2022   • Mild protein-calorie malnutrition ([address] ) 10/09/2022   • Diarrhea 10/03/2022   • Hypokalemia 10/02/2022   • Hypernatremia 10/02/2022   • Aspiration pneumonitis ([address] ) 10/01/2022   • Pulmonary edema 2022   • Bacteremia due to methicillin susceptible Staphylococcus aureus (MSSA) 2022   • Localized swelling of right upper extremity 2022   • Leg edema, right 2022   • D-dimer, elevated 2022   • ARF (acute renal failure) ([facility] ) 2022   • Transaminitis 2022   • Toxic metabolic encephalopathy    • Abnormal CT of the chest 2022   • Traumatic rhabdomyolysis ([facility] ) 2022   • Hyperglycemia 2022   • Opioid dependence ([facility] ) 2021   • Chronic right shoulder pain 2021   • Internal hemorrhoids 2020   • Adnexal cyst 2020   • Ischemic colitis ([facility] ) 2020   • Intercostal neuralgia    • Hematochezia 2019   • Insomnia 2019   • History of rib fracture 10/28/2018   • Tobacco abuse 10/28/2018   • Right knee pain 2018   • Generalized anxiety disorder 2018   • Hypertension    • Hyperlipidemia    • Depression    • COPD (chronic obstructive pulmonary disease) (HCC)    • Chondromalacia patellae 2018   • Irritable bowel syndrome with diarrhea 2018   • DDD (degenerative disc disease), lumbosacral 2014      LOS (days): 39  Geometric Mean LOS (GMLOS) (days):   Days to GMLOS:     OBJECTIVE:  Risk of Unplanned Readmission Score: 30 2         Current admission status: Inpatient   Preferred Pharmacy:   [facility] [address], [address] [place]  [address]  [id number]  Phone: [phone] Fax: [phone]    [facility], [address] [place] [address]  Phone: [phone] Fax: [phone]    Primary Care Provider: Ni Yanes MD    Primary Insurance: BLUE CROSS  Secondary Insurance:     DISCHARGE DETAILS:     Per MD rounds, patient is still on 1:1 and confused  Patient is off restraints  CM expanded search [place] 50 miles for SNF in [place]- awaiting for accepting facility  CM will continue to follow patient

## 2022-10-29 RX ORDER — GABAPENTIN 300 MG/1
300 CAPSULE ORAL 4 TIMES DAILY
Status: DISCONTINUED | OUTPATIENT
Start: 2022-10-29 | End: 2022-11-07 | Stop reason: HOSPADM

## 2022-10-29 RX ADMIN — PSYLLIUM HUSK 1 PACKET: 3.4 POWDER ORAL at 08:38

## 2022-10-29 RX ADMIN — GABAPENTIN 300 MG: 300 CAPSULE ORAL at 08:36

## 2022-10-29 RX ADMIN — ACETAMINOPHEN 650 MG: 325 TABLET, FILM COATED ORAL at 08:37

## 2022-10-29 RX ADMIN — MICONAZOLE NITRATE: 20 CREAM TOPICAL at 08:38

## 2022-10-29 RX ADMIN — QUETIAPINE FUMARATE 50 MG: 25 TABLET ORAL at 17:32

## 2022-10-29 RX ADMIN — ACETAMINOPHEN 650 MG: 325 TABLET, FILM COATED ORAL at 23:45

## 2022-10-29 RX ADMIN — GABAPENTIN 300 MG: 300 CAPSULE ORAL at 21:44

## 2022-10-29 RX ADMIN — HEPARIN SODIUM 5000 UNITS: 5000 INJECTION INTRAVENOUS; SUBCUTANEOUS at 21:44

## 2022-10-29 RX ADMIN — AMLODIPINE BESYLATE 10 MG: 10 TABLET ORAL at 08:35

## 2022-10-29 RX ADMIN — Medication 250 MG: at 17:32

## 2022-10-29 RX ADMIN — QUETIAPINE FUMARATE 50 MG: 25 TABLET ORAL at 08:37

## 2022-10-29 RX ADMIN — HEPARIN SODIUM 5000 UNITS: 5000 INJECTION INTRAVENOUS; SUBCUTANEOUS at 14:23

## 2022-10-29 RX ADMIN — MICONAZOLE NITRATE: 20 CREAM TOPICAL at 17:33

## 2022-10-29 RX ADMIN — METOPROLOL TARTRATE 25 MG: 25 TABLET, FILM COATED ORAL at 08:38

## 2022-10-29 RX ADMIN — VENLAFAXINE HYDROCHLORIDE 187.5 MG: 37.5 CAPSULE, EXTENDED RELEASE ORAL at 08:39

## 2022-10-29 RX ADMIN — HEPARIN SODIUM 5000 UNITS: 5000 INJECTION INTRAVENOUS; SUBCUTANEOUS at 06:34

## 2022-10-29 RX ADMIN — GABAPENTIN 300 MG: 300 CAPSULE ORAL at 17:32

## 2022-10-29 RX ADMIN — Medication 1 PATCH: at 08:40

## 2022-10-29 RX ADMIN — GABAPENTIN 300 MG: 300 CAPSULE ORAL at 13:01

## 2022-10-29 RX ADMIN — ALPRAZOLAM 0.5 MG: 0.5 TABLET ORAL at 02:24

## 2022-10-29 RX ADMIN — Medication 250 MG: at 08:37

## 2022-10-29 NOTE — PLAN OF CARE
Problem: PAIN - ADULT  Goal: Verbalizes/displays adequate comfort level or baseline comfort level  Description: Interventions:  - Encourage patient to monitor pain and request assistance  - Assess pain using appropriate pain scale  - Administer analgesics based on type and severity of pain and evaluate response  - Implement non-pharmacological measures as appropriate and evaluate response  - Consider cultural and social influences on pain and pain management  - Notify physician/advanced practitioner if interventions unsuccessful or patient reports new pain  Outcome: Progressing     Problem: INFECTION - ADULT  Goal: Absence or prevention of progression during hospitalization  Description: INTERVENTIONS:  - Assess and monitor for signs and symptoms of infection  - Monitor lab/diagnostic results  - Monitor all insertion sites, i e  indwelling lines, tubes, and drains  - Monitor endotracheal if appropriate and nasal secretions for changes in amount and color  - [place] appropriate cooling/warming therapies per order  - Administer medications as ordered  - Instruct and encourage patient and family to use good hand hygiene technique  - Identify and instruct in appropriate isolation precautions for identified infection/condition  Outcome: Progressing     Problem: SAFETY ADULT  Goal: Patient will remain free of falls  Description: INTERVENTIONS:  - Educate patient/family on patient safety including physical limitations  - Instruct patient to call for assistance with activity   - Consult OT/PT to assist with strengthening/mobility   - Keep Call bell within reach  - Keep bed low and locked with side rails adjusted as appropriate  - Keep care items and personal belongings within reach  - Initiate and maintain comfort rounds  - Make Fall Risk Sign visible to staff  - Offer Toileting every 2 Hours, in advance of need  - Initiate/Maintain bed alarm  - Obtain necessary fall risk management equipment: walker  - Apply yellow socks and bracelet for high fall risk patients  - Consider moving patient to room near nurses station  Outcome: Progressing  Goal: Maintain or return to baseline ADL function  Description: INTERVENTIONS:  -  Assess patient's ability to carry out ADLs; assess patient's baseline for ADL function and identify physical deficits which impact ability to perform ADLs (bathing, care of mouth/teeth, toileting, grooming, dressing, etc )  - Assess/evaluate cause of self-care deficits   - Assess range of motion  - Assess patient's mobility; develop plan if impaired  - Assess patient's need for assistive devices and provide as appropriate  - Encourage maximum independence but intervene and supervise when necessary  - Involve family in performance of ADLs  - Assess for home care needs following discharge   - Consider OT consult to assist with ADL evaluation and planning for discharge  - Provide patient education as appropriate  Outcome: Progressing  Goal: Maintains/Returns to pre admission functional level  Description: INTERVENTIONS:  - Perform BMAT or MOVE assessment daily    - Set and communicate daily mobility goal to care team and patient/family/caregiver  - Collaborate with rehabilitation services on mobility goals if consulted  - Perform Range of Motion 4-6 times a day  - Reposition patient every 2 hours    - Dangle patient 3-4 times a day  - Stand patient 3 times a day  - Ambulate patient 3-4 times a day  - Out of bed for toileting  - Record patient progress and toleration of activity level   Outcome: Progressing     Problem: DISCHARGE PLANNING  Goal: Discharge to home or other facility with appropriate resources  Description: INTERVENTIONS:  - Identify barriers to discharge w/patient and caregiver  - Arrange for needed discharge resources and transportation as appropriate  - Identify discharge learning needs (meds, wound care, etc )  - Arrange for interpretive services to assist at discharge as needed  - Refer to Case Management Department for coordinating discharge planning if the patient needs post-hospital services based on physician/advanced practitioner order or complex needs related to functional status, cognitive ability, or social support system  Outcome: Progressing     Problem: Knowledge Deficit  Goal: Patient/family/caregiver demonstrates understanding of disease process, treatment plan, medications, and discharge instructions  Description: Complete learning assessment and assess knowledge base    Interventions:  - Provide teaching at level of understanding  - Provide teaching via preferred learning methods  Outcome: Progressing     Problem: Potential for Falls  Goal: Patient will remain free of falls  Description: INTERVENTIONS:  - Educate patient/family on patient safety including physical limitations  - Instruct patient to call for assistance with activity   - Consult OT/PT to assist with strengthening/mobility   - Keep Call bell within reach  - Keep bed low and locked with side rails adjusted as appropriate  - Keep care items and personal belongings within reach  - Initiate and maintain comfort rounds  - Make Fall Risk Sign visible to staff  - Offer Toileting every 2 Hours, in advance of need  - Initiate/Maintain bed alarm  - Obtain necessary fall risk management equipment: walker  - Apply yellow socks and bracelet for high fall risk patients  - Consider moving patient to room near nurses station  Outcome: Progressing     Problem: MOBILITY - ADULT  Goal: Maintain or return to baseline ADL function  Description: INTERVENTIONS:  -  Assess patient's ability to carry out ADLs; assess patient's baseline for ADL function and identify physical deficits which impact ability to perform ADLs (bathing, care of mouth/teeth, toileting, grooming, dressing, etc )  - Assess/evaluate cause of self-care deficits   - Assess range of motion  - Assess patient's mobility; develop plan if impaired  - Assess patient's need for assistive devices and provide as appropriate  - Encourage maximum independence but intervene and supervise when necessary  - Involve family in performance of ADLs  - Assess for home care needs following discharge   - Consider OT consult to assist with ADL evaluation and planning for discharge  - Provide patient education as appropriate  Outcome: Progressing  Goal: Maintains/Returns to pre admission functional level  Description: INTERVENTIONS:  - Perform BMAT or MOVE assessment daily    - Set and communicate daily mobility goal to care team and patient/family/caregiver  - Collaborate with rehabilitation services on mobility goals if consulted  - Perform Range of Motion 4-6 times a day  - Reposition patient every 2 hours  - Dangle patient 3-4 times a day  - Stand patient 3 times a day  - Ambulate patient 3-4 times a day  - Out of bed for toileting  - Record patient progress and toleration of activity level   Outcome: Progressing     Problem: Prexisting or High Potential for Compromised Skin Integrity  Goal: Skin integrity is maintained or improved  Description: INTERVENTIONS:  - Identify patients at risk for skin breakdown  - Assess and monitor skin integrity  - Assess and monitor nutrition and hydration status  - Monitor labs   - Assess for incontinence   - Turn and reposition patient  - Assist with mobility/ambulation  - Relieve pressure over bony prominences  - Avoid friction and shearing  - Provide appropriate hygiene as needed including keeping skin clean and dry  - Evaluate need for skin moisturizer/barrier cream  - Collaborate with interdisciplinary team   - Patient/family teaching  - Consider wound care consult   Outcome: Progressing     Problem: Nutrition/Hydration-ADULT  Goal: Nutrient/Hydration intake appropriate for improving, restoring or maintaining nutritional needs  Description: Monitor and assess patient's nutrition/hydration status for malnutrition   Collaborate with interdisciplinary team and initiate plan and interventions as ordered  Monitor patient's weight and dietary intake as ordered or per policy  Utilize nutrition screening tool and intervene as necessary  Determine patient's food preferences and provide high-protein, high-caloric foods as appropriate  INTERVENTIONS:  - Monitor oral intake, urinary output, labs, and treatment plans  - Assess nutrition and hydration status and recommend course of action  - Evaluate amount of meals eaten  - Assist patient with eating if necessary   - Allow adequate time for meals  - Recommend/ encourage appropriate diets, oral nutritional supplements, and vitamin/mineral supplements  - Order, calculate, and assess calorie counts as needed  - Recommend, monitor, and adjust tube feedings and TPN/PPN based on assessed needs  - Assess need for intravenous fluids  - Provide specific nutrition/hydration education as appropriate  - Include patient/family/caregiver in decisions related to nutrition  Outcome: Progressing     Problem: NEUROSENSORY - ADULT  Goal: Achieves maximal functionality and self care  Description: INTERVENTIONS  - Monitor swallowing and airway patency with patient fatigue and changes in neurological status  - Encourage and assist patient to increase activity and self care     - Encourage visually impaired, hearing impaired and aphasic patients to use assistive/communication devices  Outcome: Progressing  Goal: Achieves stable or improved neurological status  Description: INTERVENTIONS  - Monitor and report changes in neurological status  - Monitor vital signs such as temperature, blood pressure, glucose, and any other labs ordered   - Initiate measures to prevent increased intracranial pressure  - Monitor for seizure activity and implement precautions if appropriate      Outcome: Progressing     Problem: CARDIOVASCULAR - ADULT  Goal: Maintains optimal cardiac output and hemodynamic stability  Description: INTERVENTIONS:  - Monitor I/O, vital signs and rhythm  - Monitor for S/S and trends of decreased cardiac output  - Administer and titrate ordered vasoactive medications to optimize hemodynamic stability  - Assess quality of pulses, skin color and temperature  - Assess for signs of decreased coronary artery perfusion  - Instruct patient to report change in severity of symptoms  Outcome: Progressing     Problem: RESPIRATORY - ADULT  Goal: Achieves optimal ventilation and oxygenation  Description: INTERVENTIONS:  - Assess for changes in respiratory status  - Assess for changes in mentation and behavior  - Position to facilitate oxygenation and minimize respiratory effort  - Oxygen administered by appropriate delivery if ordered  - Initiate smoking cessation education as indicated  - Encourage broncho-pulmonary hygiene including cough, deep breathe, Incentive Spirometry  - Assess the need for suctioning and aspirate as needed  - Assess and instruct to report SOB or any respiratory difficulty  - Respiratory Therapy support as indicated  Outcome: Progressing     Problem: GASTROINTESTINAL - ADULT  Goal: Maintains adequate nutritional intake  Description: INTERVENTIONS:  - Monitor percentage of each meal consumed  - Identify factors contributing to decreased intake, treat as appropriate  - Assist with meals as needed  - Monitor I&O, weight, and lab values if indicated  - Obtain nutrition services referral as needed  Outcome: Progressing     Problem: METABOLIC, FLUID AND ELECTROLYTES - ADULT  Goal: Electrolytes maintained within normal limits  Description: INTERVENTIONS:  - Monitor labs and assess patient for signs and symptoms of electrolyte imbalances  - Administer electrolyte replacement as ordered  - Monitor response to electrolyte replacements, including repeat lab results as appropriate  - Instruct patient on fluid and nutrition as appropriate  Outcome: Progressing  Goal: Fluid balance maintained  Description: INTERVENTIONS:  - Monitor labs   - Monitor I/O and WT  - Instruct patient on fluid and nutrition as appropriate  - Assess for signs & symptoms of volume excess or deficit  Outcome: Progressing     Problem: SKIN/TISSUE INTEGRITY - ADULT  Goal: Skin Integrity remains intact(Skin Breakdown Prevention)  Description: Assess:  -Perform Erickson assessment every shift  -Clean and moisturize skin every day  -Inspect skin when repositioning, toileting, and assisting with ADLS  -Assess extremities for adequate circulation and sensation     Bed Management:  -Have minimal linens on bed & keep smooth, unwrinkled  -Change linens as needed when moist or perspiring  -Avoid sitting or lying in one position for more than 2 hours while in bed  -Keep HOB at 45 degrees     Toileting:  -Offer bedside commode  -Assess for incontinence every 2 hours  -Use incontinent care products after each incontinent episode such as brief    Activity:  -Mobilize patient 3-4 times a day  -Encourage activity and walks on unit  -Encourage or provide ROM exercises   -Turn and reposition patient every 2 Hours  -Use appropriate equipment to lift or move patient in bed  -Instruct/ Assist with weight shifting every 2 when out of bed in chair  -Consider limitation of chair time 2 hour intervals    Skin Care:  -Avoid use of baby powder, tape, friction and shearing, hot water or constrictive clothing  -Relieve pressure over bony prominences using pillows  -Do not massage red bony areas    Next Steps:  -Teach patient strategies to minimize risks such as walker  -Consider consults to  interdisciplinary teams such as wound  Outcome: Progressing  Goal: Incision(s), wounds(s) or drain site(s) healing without S/S of infection  Description: INTERVENTIONS  - Assess and document dressing, incision, wound bed, drain sites and surrounding tissue  - Provide patient and family education  - Perform skin care/dressing changes every PRN  Outcome: Progressing     Problem: MUSCULOSKELETAL - ADULT  Goal: Maintain or return mobility to safest level of function  Description: INTERVENTIONS:  - Assess patient's ability to carry out ADLs; assess patient's baseline for ADL function and identify physical deficits which impact ability to perform ADLs (bathing, care of mouth/teeth, toileting, grooming, dressing, etc )  - Assess/evaluate cause of self-care deficits   - Assess range of motion  - Assess patient's mobility  - Assess patient's need for assistive devices and provide as appropriate  - Encourage maximum independence but intervene and supervise when necessary  - Involve family in performance of ADLs  - Assess for home care needs following discharge   - Consider OT consult to assist with ADL evaluation and planning for discharge  - Provide patient education as appropriate  Outcome: Progressing

## 2022-10-29 NOTE — ASSESSMENT & PLAN NOTE
· Lumbar radiculopathy with previously scheduled surgery now on hold  Discussed case with patient's outpatient neurosurgeon  Initial surgery was canceled prior to hospitalization  It was decided that patient will need a much larger procedure with multilevel fusion and scoliosis correction  They discussed that surgery would be high risk given prolonged anesthesia time and comorbidities      · Prior to admission was on gabapentin and morphine IR 15 mg  · Patient reporting some lower extremity neuropathy today  · Increase gabapentin back to home dose 300 mg q 6 hours

## 2022-10-29 NOTE — PLAN OF CARE
Problem: PAIN - ADULT  Goal: Verbalizes/displays adequate comfort level or baseline comfort level  Description: Interventions:  - Encourage patient to monitor pain and request assistance  - Assess pain using appropriate pain scale  - Administer analgesics based on type and severity of pain and evaluate response  - Implement non-pharmacological measures as appropriate and evaluate response  - Consider cultural and social influences on pain and pain management  - Notify physician/advanced practitioner if interventions unsuccessful or patient reports new pain  Outcome: Progressing     Problem: INFECTION - ADULT  Goal: Absence or prevention of progression during hospitalization  Description: INTERVENTIONS:  - Assess and monitor for signs and symptoms of infection  - Monitor lab/diagnostic results  - Monitor all insertion sites, i e  indwelling lines, tubes, and drains  - Monitor endotracheal if appropriate and nasal secretions for changes in amount and color  - [place] appropriate cooling/warming therapies per order  - Administer medications as ordered  - Instruct and encourage patient and family to use good hand hygiene technique  - Identify and instruct in appropriate isolation precautions for identified infection/condition  Outcome: Progressing     Problem: SAFETY ADULT  Goal: Patient will remain free of falls  Description: INTERVENTIONS:  - Educate patient/family on patient safety including physical limitations  - Instruct patient to call for assistance with activity   - Consult OT/PT to assist with strengthening/mobility   - Keep Call bell within reach  - Keep bed low and locked with side rails adjusted as appropriate  - Keep care items and personal belongings within reach  - Initiate and maintain comfort rounds  - Make Fall Risk Sign visible to staff  - Offer Toileting every 2 Hours, in advance of need  - Initiate/Maintain bed alarm  - Obtain necessary fall risk management equipment: walker  - Apply yellow socks and bracelet for high fall risk patients  - Consider moving patient to room near nurses station  Outcome: Progressing  Goal: Maintain or return to baseline ADL function  Description: INTERVENTIONS:  -  Assess patient's ability to carry out ADLs; assess patient's baseline for ADL function and identify physical deficits which impact ability to perform ADLs (bathing, care of mouth/teeth, toileting, grooming, dressing, etc )  - Assess/evaluate cause of self-care deficits   - Assess range of motion  - Assess patient's mobility; develop plan if impaired  - Assess patient's need for assistive devices and provide as appropriate  - Encourage maximum independence but intervene and supervise when necessary  - Involve family in performance of ADLs  - Assess for home care needs following discharge   - Consider OT consult to assist with ADL evaluation and planning for discharge  - Provide patient education as appropriate  Outcome: Progressing  Goal: Maintains/Returns to pre admission functional level  Description: INTERVENTIONS:  - Perform BMAT or MOVE assessment daily    - Set and communicate daily mobility goal to care team and patient/family/caregiver  - Collaborate with rehabilitation services on mobility goals if consulted  - Perform Range of Motion 4-6 times a day  - Reposition patient every 2 hours    - Dangle patient 3-4 times a day  - Stand patient 3 times a day  - Ambulate patient 3-4 times a day  - Out of bed for toileting  - Record patient progress and toleration of activity level   Outcome: Progressing     Problem: DISCHARGE PLANNING  Goal: Discharge to home or other facility with appropriate resources  Description: INTERVENTIONS:  - Identify barriers to discharge w/patient and caregiver  - Arrange for needed discharge resources and transportation as appropriate  - Identify discharge learning needs (meds, wound care, etc )  - Arrange for interpretive services to assist at discharge as needed  - Refer to Case Management Department for coordinating discharge planning if the patient needs post-hospital services based on physician/advanced practitioner order or complex needs related to functional status, cognitive ability, or social support system  Outcome: Progressing

## 2022-10-30 RX ADMIN — MICONAZOLE NITRATE: 20 CREAM TOPICAL at 17:10

## 2022-10-30 RX ADMIN — METOPROLOL TARTRATE 25 MG: 25 TABLET, FILM COATED ORAL at 08:22

## 2022-10-30 RX ADMIN — Medication 250 MG: at 08:22

## 2022-10-30 RX ADMIN — AMLODIPINE BESYLATE 10 MG: 10 TABLET ORAL at 08:22

## 2022-10-30 RX ADMIN — QUETIAPINE FUMARATE 50 MG: 25 TABLET ORAL at 08:22

## 2022-10-30 RX ADMIN — HEPARIN SODIUM 5000 UNITS: 5000 INJECTION INTRAVENOUS; SUBCUTANEOUS at 05:36

## 2022-10-30 RX ADMIN — Medication 250 MG: at 17:07

## 2022-10-30 RX ADMIN — GABAPENTIN 300 MG: 300 CAPSULE ORAL at 13:06

## 2022-10-30 RX ADMIN — HEPARIN SODIUM 5000 UNITS: 5000 INJECTION INTRAVENOUS; SUBCUTANEOUS at 13:06

## 2022-10-30 RX ADMIN — QUETIAPINE FUMARATE 50 MG: 25 TABLET ORAL at 17:07

## 2022-10-30 RX ADMIN — Medication 1 PATCH: at 08:22

## 2022-10-30 RX ADMIN — GABAPENTIN 300 MG: 300 CAPSULE ORAL at 08:22

## 2022-10-30 RX ADMIN — MICONAZOLE NITRATE: 20 CREAM TOPICAL at 08:25

## 2022-10-30 RX ADMIN — GABAPENTIN 300 MG: 300 CAPSULE ORAL at 17:07

## 2022-10-30 RX ADMIN — GABAPENTIN 300 MG: 300 CAPSULE ORAL at 21:14

## 2022-10-30 RX ADMIN — VENLAFAXINE HYDROCHLORIDE 187.5 MG: 37.5 CAPSULE, EXTENDED RELEASE ORAL at 08:21

## 2022-10-30 RX ADMIN — HEPARIN SODIUM 5000 UNITS: 5000 INJECTION INTRAVENOUS; SUBCUTANEOUS at 21:14

## 2022-10-30 NOTE — UTILIZATION REVIEW
Continued Stay Review    Date: 10/30/22                         Current Patient Class: IP  Current Level of Care: Med/Surg    HPI:52 y o  female initially admitted on 9 /19 for toxic metabolic encephalopathy  Assessment/Plan: Reports RLE pain, disoriented to hospital situation  On exam, pain w/ palpation of R great toe and forefoot  Plan: stable for d/c, pending placement, continue current meds, Trend labs, replete electrolytes as needed  Supportive care  Gabapentin dose increased from TID to QID       Vital Signs:   Date/Time Temp Pulse Resp BP MAP (mmHg) SpO2 O2 Device   10/30/22 07:47:25 98 7 °F (37 1 °C) 101 14 125/86 99 96 % --   10/29/22 19:15:55 98 7 °F (37 1 °C) 115 Abnormal  18 100/55 -- 96 % None (Room air)   10/29/22 19:15:36 98 7 °F (37 1 °C) 116 Abnormal  -- -- -- 98 % --   10/29/22 15:34:22 98 1 °F (36 7 °C) 107 Abnormal  16 110/67 81 97 % --   10/29/22 08:36:36 99 1 °F (37 3 °C) 105 -- 126/87 100 98 % --   10/28/22 21:23:24 97 9 °F (36 6 °C) 99 18 124/82 96 97 % --         Pertinent Labs/Diagnostic Results:   Results from last 7 days   Lab Units 10/26/22  1049 10/24/22  0530   WBC Thousand/uL 6 37 8 87   HEMOGLOBIN g/dL 10 5* 11 7   HEMATOCRIT % 33 8* 37 8   PLATELETS Thousands/uL 229 310   NEUTROS ABS Thousands/µL  --  5 87         Results from last 7 days   Lab Units 10/26/22  1049 10/24/22  0530   SODIUM mmol/L 140 135   POTASSIUM mmol/L 3 5 4 1   CHLORIDE mmol/L 104 100   CO2 mmol/L 25 25   ANION GAP mmol/L 11 10   BUN mg/dL 12 15   CREATININE mg/dL 0 75 0 94   EGFR ml/min/1 73sq m 90 68   CALCIUM mg/dL 9 1 9 5   MAGNESIUM mg/dL  --  1 5*     Results from last 7 days   Lab Units 10/26/22  1049 10/24/22  0530   GLUCOSE RANDOM mg/dL 138 107         Medications:   Scheduled Medications:  amLODIPine, 10 mg, Oral, Daily  gabapentin, 300 mg, Oral, 4x Daily  heparin (porcine), 5,000 Units, Subcutaneous, Q8H CHOCO  metoprolol tartrate, 25 mg, Oral, Q12H CHOCO  VANDANA ANTIFUNGAL, , Topical, BID  nicotine, 1 patch, Transdermal, Daily  psyllium, 1 packet, Oral, Daily  QUEtiapine, 50 mg, Oral, BID  saccharomyces boulardii, 250 mg, Oral, BID  venlafaxine, 187 5 mg, Oral, Daily    Continuous IV Infusions: none    PRN Meds:  acetaminophen, 650 mg, Oral, Q6H PRN; 10/29 x2  ALPRAZolam, 0 5 mg, Oral, HS PRN; 10/29 x1  levalbuterol, 1 25 mg, Nebulization, Q4H PRN  loperamide, 2 mg, Oral, 4x Daily PRN  OLANZapine, 5 mg, Oral, Q6H PRN  ondansetron, 4 mg, Intravenous, Q4H PRN        Discharge Plan: TBD, pending placement    Network Utilization Review Department  ATTENTION: Please call with any questions or concerns to [phone] and carefully listen to the prompts so that you are directed to the right person  All voicemails are confidential   Sandie Simmons all requests for admission clinical reviews, approved or denied determinations and any other requests to dedicated fax number below belonging to the campus where the patient is receiving treatment   List of dedicated fax numbers for the Facilities:  [address] DENIALS (Administrative/Medical Necessity) [phone]   [address] (Maternity/NICU/Pediatrics) [phone]   2 Minda Baldwin [phone]   Sivamikey Davila 77 [phone]   [address] [place] [address] Wanda Miller 28 [phone]   [employer] Rory Hays [facility], [facility] 134 815 [facility] [phone]

## 2022-10-30 NOTE — ASSESSMENT & PLAN NOTE
· Lumbar radiculopathy with previously scheduled surgery now on hold  Discussed case with patient's outpatient neurosurgeon  Initial surgery was canceled prior to hospitalization  It was decided that patient will need a much larger procedure with multilevel fusion and scoliosis correction  They discussed that surgery would be high risk given prolonged anesthesia time and comorbidities  · Prior to admission was on gabapentin and morphine IR 15 mg  · Patient reporting some lower extremity neuropathy today located most and right great toe    During hospital stay patient has had duplex lower extremity and x-rays without acute findings  · Increase gabapentin back to home dose 300 mg q 6 hours  · Can consider addition of low-dose opioids if no improvement in symptoms

## 2022-10-30 NOTE — PROGRESS NOTES
[facility]  Progress Note - Chante Small 1970, 45 y o  female MRN: [id number]  Unit/Bed#: Antoninau Kaitlynn -01 Encounter: [id number]  Primary Care Provider: Luna Barthel, MD   Date and time admitted to hospital: 9/19/2022  8:04 AM    * Toxic metabolic encephalopathy  Assessment & Plan  Due to multiple metabolic derangements, home medications morphine and gabapentin in the setting of acute kidney injury  Overall from admission she has improved but is still confused  Patient has been evaluated by Neurology, Gerhard Leonard during prolonged hospital stay  MRI performed 10/14/2022 with development of severe periventricular and white matter hyperintensities without mass effect enhancement or hemorrhage correlation with toxic encephalopathy  Recommendation for 3 month follow-up to ensure resolution  Patient completed antibiotic course  Medically stable for discharge to inpatient rehab          Mild protein-calorie malnutrition ([address] )  Assessment & Plan  Malnutrition Findings:   Adult Malnutrition type: Acute illness  Adult Degree of Malnutrition: Malnutrition of moderate degree  Malnutrition Characteristics: Fat loss, Muscle loss, Inadequate energy, Weight loss                  360 Statement: Acute moderate pro, chelsey malnutrition d/t condition as evidence by mild muscle and fat loss at temples, orbitals, triceps, <75% energy intake > 7 days, 18 1% wt loss x 1 month, BMI 18 4, currently treated with liberalized oral diet, tried supplements, but PT refused, will continue to monitor for food preferences and additional snacks in between meals  BMI Findings:  Adult BMI Classifications: Underweight < 18 5        Body mass index is 18 4 kg/m²         Diarrhea  Assessment & Plan  Reports having multiple loose bowel movements  C diff negative  Continue florastor, Metamucil immodium prn    Pulmonary edema  Assessment & Plan  Developed flash pulmonary edema during hospital stay following aggressive IV resuscitation  Now resolved  Echocardiogram with preserved ejection fraction  Stable off diuretics    Bacteremia due to methicillin susceptible Staphylococcus aureus (MSSA)  Assessment & Plan  MSSA bacteremia, repeat cultures neg for greater than 72 hours  Echo with no vegetation  Appreciate recommendations from ID  Completed abx 10/26/22   Repeat blood cultures 2 week from completion of antibiotics    Traumatic rhabdomyolysis ([facility] [address] )  Assessment & Plan  · Secondary to fall, patient had been laying in bed for approximately 4 days  · She was subsequently brought to the hospital and found to be in acute rhabdomyolysis with ANUJA  · Had been on multiple sedating medications including MS Contin as well as gabapentin, which likely were contributing to sedation  · Now resolved    ARF (acute renal failure) ([facility] [address] )  Assessment & Plan  · ANUJA/ATN secondary to rhabdomyolysis  No evidence of renal obstruction on imaging    Kidney function has returned to normal      DDD (degenerative disc disease), lumbosacral  Assessment & Plan  · Lumbar radiculopathy with previously scheduled surgery now on hold  Discussed case with patient's outpatient neurosurgeon  Initial surgery was canceled prior to hospitalization  It was decided that patient will need a much larger procedure with multilevel fusion and scoliosis correction  They discussed that surgery would be high risk given prolonged anesthesia time and comorbidities  · Prior to admission was on gabapentin and morphine IR 15 mg  · Patient reporting some lower extremity neuropathy today located most and right great toe    During hospital stay patient has had duplex lower extremity and x-rays without acute findings  · Increase gabapentin back to home dose 300 mg q 6 hours  · Can consider addition of low-dose opioids if no improvement in symptoms        Tobacco abuse  Assessment & Plan  · Nicotine patch ordered    Depression  Assessment & Plan  · Does have history of suicide attempt a currently does not show any signs of thoughts of self-harm  · Psychiatry consultation appreciated  · Continue current medications      VTE Pharmacologic Prophylaxis:  Heparin    Patient Centered Rounds:  Patient care rounds were performed with nursing    Time Spent for Care: 30  More than 50% of total time spent on counseling and coordination of care as described above  Current Length of Stay: 41 day(s)    Current Patient Status: Inpatient   Certification Statement: The patient will continue to require additional inpatient hospital stay due to awaiting placement    Discharge Plan:  Awaiting placement    Code Status: Level 1 - Full Code      Subjective:   Patient seen evaluated at bedside  Reports right lower extremity pain  Disoriented to hospital situation  Objective:     Vitals:   Temp (24hrs), Av 6 °F (37 °C), Min:98 1 °F (36 7 °C), Max:98 7 °F (37 1 °C)    Temp:  [98 1 °F (36 7 °C)-98 7 °F (37 1 °C)] 98 7 °F (37 1 °C)  HR:  [101-116] 101  Resp:  [14-18] 14  BP: (100-125)/(55-86) 125/86  SpO2:  [96 %-98 %] 96 %  Body mass index is 18 4 kg/m²  Input and Output Summary (last 24 hours):     No intake or output data in the 24 hours ending 10/30/22 1049    Physical Exam:     Physical Exam  Vitals reviewed  Constitutional:       General: She is not in acute distress  Appearance: She is well-developed  She is not ill-appearing, toxic-appearing or diaphoretic  HENT:      Head: Normocephalic and atraumatic  Mouth/Throat:      Mouth: Mucous membranes are moist    Eyes:      General: No scleral icterus  Extraocular Movements: Extraocular movements intact  Cardiovascular:      Rate and Rhythm: Normal rate and regular rhythm  Heart sounds: Normal heart sounds  Pulmonary:      Effort: Pulmonary effort is normal  No respiratory distress  Breath sounds: Normal breath sounds  No wheezing or rales  Abdominal:      General: There is no distension        Palpations: Abdomen is soft  Tenderness: There is no abdominal tenderness  There is no guarding or rebound  Musculoskeletal:         General: No swelling, tenderness or deformity  Comments: Pain with palpation of right great toe and forefoot   Skin:     General: Skin is warm and dry  Neurological:      General: No focal deficit present  Mental Status: She is alert  She is disoriented  Psychiatric:         Mood and Affect: Mood normal          Behavior: Behavior normal          Thought Content: Thought content normal          Judgment: Judgment normal          Additional Data:     Labs:  I have reviewed pertinent results     Results from last 7 days   Lab Units 10/26/22  1049 10/24/22  0530   WBC Thousand/uL 6 37 8 87   HEMOGLOBIN g/dL 10 5* 11 7   HEMATOCRIT % 33 8* 37 8   PLATELETS Thousands/uL 229 310   NEUTROS PCT %  --  66   LYMPHS PCT %  --  26   MONOS PCT %  --  5   EOS PCT %  --  2     Results from last 7 days   Lab Units 10/26/22  1049   SODIUM mmol/L 140   POTASSIUM mmol/L 3 5   CHLORIDE mmol/L 104   CO2 mmol/L 25   BUN mg/dL 12   CREATININE mg/dL 0 75   ANION GAP mmol/L 11   CALCIUM mg/dL 9 1   GLUCOSE RANDOM mg/dL 138                         Imaging: I have reviewed pertinent imaging       Recent Cultures (last 7 days):           Last 24 Hours Medication List:   Current Facility-Administered Medications   Medication Dose Route Frequency Provider Last Rate   • acetaminophen  650 mg Oral Q6H PRN Yoshi Ramos DO     • ALPRAZolam  0 5 mg Oral HS PRN Lindsay Hastings MD     • amLODIPine  10 mg Oral Daily Selena Marino MD     • gabapentin  300 mg Oral 4x Daily Hong Cast DO     • heparin (porcine)  5,000 Units Subcutaneous [facility] Atul Adan DO     • levalbuterol  1 25 mg Nebulization Q4H PRN Víctor Santiago DO     • loperamide  2 mg Oral 4x Daily PRN Lindsay Hastings MD     • metoprolol tartrate  25 mg Oral Q12H [address] Inder Canas MD     • VANDANA ANTIFUNGAL   Topical BID Denis Medellin Bernabe Marques, DO     • nicotine  1 patch Transdermal Daily Atul Adan, DO     • OLANZapine  5 mg Oral Q6H PRN Adline Pollen, DO     • ondansetron  4 mg Intravenous Q4H PRN Lum Bread, DO     • psyllium  1 packet Oral Daily Balaji Nath, DO     • QUEtiapine  50 mg Oral BID Jayshree Serrano MD     • saccharomyces boulardii  250 mg Oral BID Jayshree Serrano MD     • venlafaxine  187 5 mg Oral Daily Marla Henriquez,           Today, Patient Was Seen By: Balaji Nath    ** Please Note: Dictation voice to text software may have been used in the creation of this document   **

## 2022-10-30 NOTE — PLAN OF CARE
Problem: PAIN - ADULT  Goal: Verbalizes/displays adequate comfort level or baseline comfort level  Description: Interventions:  - Encourage patient to monitor pain and request assistance  - Assess pain using appropriate pain scale  - Administer analgesics based on type and severity of pain and evaluate response  - Implement non-pharmacological measures as appropriate and evaluate response  - Consider cultural and social influences on pain and pain management  - Notify physician/advanced practitioner if interventions unsuccessful or patient reports new pain  Outcome: Progressing     Problem: INFECTION - ADULT  Goal: Absence or prevention of progression during hospitalization  Description: INTERVENTIONS:  - Assess and monitor for signs and symptoms of infection  - Monitor lab/diagnostic results  - Monitor all insertion sites, i e  indwelling lines, tubes, and drains  - Monitor endotracheal if appropriate and nasal secretions for changes in amount and color  - [place] appropriate cooling/warming therapies per order  - Administer medications as ordered  - Instruct and encourage patient and family to use good hand hygiene technique  - Identify and instruct in appropriate isolation precautions for identified infection/condition  Outcome: Progressing     Problem: SAFETY ADULT  Goal: Patient will remain free of falls  Description: INTERVENTIONS:  - Educate patient/family on patient safety including physical limitations  - Instruct patient to call for assistance with activity   - Consult OT/PT to assist with strengthening/mobility   - Keep Call bell within reach  - Keep bed low and locked with side rails adjusted as appropriate  - Keep care items and personal belongings within reach  - Initiate and maintain comfort rounds  - Make Fall Risk Sign visible to staff  - Offer Toileting every 2 Hours, in advance of need  - Initiate/Maintain bed alarm  - Obtain necessary fall risk management equipment: walker  - Apply yellow socks and bracelet for high fall risk patients  - Consider moving patient to room near nurses station  Outcome: Progressing  Goal: Maintain or return to baseline ADL function  Description: INTERVENTIONS:  -  Assess patient's ability to carry out ADLs; assess patient's baseline for ADL function and identify physical deficits which impact ability to perform ADLs (bathing, care of mouth/teeth, toileting, grooming, dressing, etc )  - Assess/evaluate cause of self-care deficits   - Assess range of motion  - Assess patient's mobility; develop plan if impaired  - Assess patient's need for assistive devices and provide as appropriate  - Encourage maximum independence but intervene and supervise when necessary  - Involve family in performance of ADLs  - Assess for home care needs following discharge   - Consider OT consult to assist with ADL evaluation and planning for discharge  - Provide patient education as appropriate  Outcome: Progressing  Goal: Maintains/Returns to pre admission functional level  Description: INTERVENTIONS:  - Perform BMAT or MOVE assessment daily    - Set and communicate daily mobility goal to care team and patient/family/caregiver  - Collaborate with rehabilitation services on mobility goals if consulted  - Perform Range of Motion 4-6 times a day  - Reposition patient every 2 hours    - Dangle patient 3-4 times a day  - Stand patient 3 times a day  - Ambulate patient 3-4 times a day  - Out of bed for toileting  - Record patient progress and toleration of activity level   Outcome: Progressing     Problem: DISCHARGE PLANNING  Goal: Discharge to home or other facility with appropriate resources  Description: INTERVENTIONS:  - Identify barriers to discharge w/patient and caregiver  - Arrange for needed discharge resources and transportation as appropriate  - Identify discharge learning needs (meds, wound care, etc )  - Arrange for interpretive services to assist at discharge as needed  - Refer to Case Management Department for coordinating discharge planning if the patient needs post-hospital services based on physician/advanced practitioner order or complex needs related to functional status, cognitive ability, or social support system  Outcome: Progressing     Problem: Knowledge Deficit  Goal: Patient/family/caregiver demonstrates understanding of disease process, treatment plan, medications, and discharge instructions  Description: Complete learning assessment and assess knowledge base    Interventions:  - Provide teaching at level of understanding  - Provide teaching via preferred learning methods  Outcome: Progressing     Problem: Potential for Falls  Goal: Patient will remain free of falls  Description: INTERVENTIONS:  - Educate patient/family on patient safety including physical limitations  - Instruct patient to call for assistance with activity   - Consult OT/PT to assist with strengthening/mobility   - Keep Call bell within reach  - Keep bed low and locked with side rails adjusted as appropriate  - Keep care items and personal belongings within reach  - Initiate and maintain comfort rounds  - Make Fall Risk Sign visible to staff  - Offer Toileting every 2 Hours, in advance of need  - Initiate/Maintain bed alarm  - Obtain necessary fall risk management equipment: walker  - Apply yellow socks and bracelet for high fall risk patients  - Consider moving patient to room near nurses station  Outcome: Progressing     Problem: MOBILITY - ADULT  Goal: Maintain or return to baseline ADL function  Description: INTERVENTIONS:  -  Assess patient's ability to carry out ADLs; assess patient's baseline for ADL function and identify physical deficits which impact ability to perform ADLs (bathing, care of mouth/teeth, toileting, grooming, dressing, etc )  - Assess/evaluate cause of self-care deficits   - Assess range of motion  - Assess patient's mobility; develop plan if impaired  - Assess patient's need for assistive devices and provide as appropriate  - Encourage maximum independence but intervene and supervise when necessary  - Involve family in performance of ADLs  - Assess for home care needs following discharge   - Consider OT consult to assist with ADL evaluation and planning for discharge  - Provide patient education as appropriate  Outcome: Progressing  Goal: Maintains/Returns to pre admission functional level  Description: INTERVENTIONS:  - Perform BMAT or MOVE assessment daily    - Set and communicate daily mobility goal to care team and patient/family/caregiver  - Collaborate with rehabilitation services on mobility goals if consulted  - Perform Range of Motion 4-6 times a day  - Reposition patient every 2 hours  - Dangle patient 3-4 times a day  - Stand patient 3 times a day  - Ambulate patient 3-4 times a day  - Out of bed for toileting  - Record patient progress and toleration of activity level   Outcome: Progressing     Problem: Prexisting or High Potential for Compromised Skin Integrity  Goal: Skin integrity is maintained or improved  Description: INTERVENTIONS:  - Identify patients at risk for skin breakdown  - Assess and monitor skin integrity  - Assess and monitor nutrition and hydration status  - Monitor labs   - Assess for incontinence   - Turn and reposition patient  - Assist with mobility/ambulation  - Relieve pressure over bony prominences  - Avoid friction and shearing  - Provide appropriate hygiene as needed including keeping skin clean and dry  - Evaluate need for skin moisturizer/barrier cream  - Collaborate with interdisciplinary team   - Patient/family teaching  - Consider wound care consult   Outcome: Progressing     Problem: Nutrition/Hydration-ADULT  Goal: Nutrient/Hydration intake appropriate for improving, restoring or maintaining nutritional needs  Description: Monitor and assess patient's nutrition/hydration status for malnutrition   Collaborate with interdisciplinary team and initiate plan and interventions as ordered  Monitor patient's weight and dietary intake as ordered or per policy  Utilize nutrition screening tool and intervene as necessary  Determine patient's food preferences and provide high-protein, high-caloric foods as appropriate  INTERVENTIONS:  - Monitor oral intake, urinary output, labs, and treatment plans  - Assess nutrition and hydration status and recommend course of action  - Evaluate amount of meals eaten  - Assist patient with eating if necessary   - Allow adequate time for meals  - Recommend/ encourage appropriate diets, oral nutritional supplements, and vitamin/mineral supplements  - Order, calculate, and assess calorie counts as needed  - Recommend, monitor, and adjust tube feedings and TPN/PPN based on assessed needs  - Assess need for intravenous fluids  - Provide specific nutrition/hydration education as appropriate  - Include patient/family/caregiver in decisions related to nutrition  Outcome: Progressing     Problem: NEUROSENSORY - ADULT  Goal: Achieves maximal functionality and self care  Description: INTERVENTIONS  - Monitor swallowing and airway patency with patient fatigue and changes in neurological status  - Encourage and assist patient to increase activity and self care     - Encourage visually impaired, hearing impaired and aphasic patients to use assistive/communication devices  Outcome: Progressing  Goal: Achieves stable or improved neurological status  Description: INTERVENTIONS  - Monitor and report changes in neurological status  - Monitor vital signs such as temperature, blood pressure, glucose, and any other labs ordered   - Initiate measures to prevent increased intracranial pressure  - Monitor for seizure activity and implement precautions if appropriate      Outcome: Progressing     Problem: CARDIOVASCULAR - ADULT  Goal: Maintains optimal cardiac output and hemodynamic stability  Description: INTERVENTIONS:  - Monitor I/O, vital signs and rhythm  - Monitor for S/S and trends of decreased cardiac output  - Administer and titrate ordered vasoactive medications to optimize hemodynamic stability  - Assess quality of pulses, skin color and temperature  - Assess for signs of decreased coronary artery perfusion  - Instruct patient to report change in severity of symptoms  Outcome: Progressing     Problem: RESPIRATORY - ADULT  Goal: Achieves optimal ventilation and oxygenation  Description: INTERVENTIONS:  - Assess for changes in respiratory status  - Assess for changes in mentation and behavior  - Position to facilitate oxygenation and minimize respiratory effort  - Oxygen administered by appropriate delivery if ordered  - Initiate smoking cessation education as indicated  - Encourage broncho-pulmonary hygiene including cough, deep breathe, Incentive Spirometry  - Assess the need for suctioning and aspirate as needed  - Assess and instruct to report SOB or any respiratory difficulty  - Respiratory Therapy support as indicated  Outcome: Progressing     Problem: GASTROINTESTINAL - ADULT  Goal: Maintains adequate nutritional intake  Description: INTERVENTIONS:  - Monitor percentage of each meal consumed  - Identify factors contributing to decreased intake, treat as appropriate  - Assist with meals as needed  - Monitor I&O, weight, and lab values if indicated  - Obtain nutrition services referral as needed  Outcome: Progressing     Problem: METABOLIC, FLUID AND ELECTROLYTES - ADULT  Goal: Electrolytes maintained within normal limits  Description: INTERVENTIONS:  - Monitor labs and assess patient for signs and symptoms of electrolyte imbalances  - Administer electrolyte replacement as ordered  - Monitor response to electrolyte replacements, including repeat lab results as appropriate  - Instruct patient on fluid and nutrition as appropriate  Outcome: Progressing  Goal: Fluid balance maintained  Description: INTERVENTIONS:  - Monitor labs   - Monitor I/O and WT  - Instruct patient on fluid and nutrition as appropriate  - Assess for signs & symptoms of volume excess or deficit  Outcome: Progressing     Problem: SKIN/TISSUE INTEGRITY - ADULT  Goal: Skin Integrity remains intact(Skin Breakdown Prevention)  Description: Assess:  -Perform Erickson assessment every shift  -Clean and moisturize skin every day  -Inspect skin when repositioning, toileting, and assisting with ADLS  -Assess extremities for adequate circulation and sensation     Bed Management:  -Have minimal linens on bed & keep smooth, unwrinkled  -Change linens as needed when moist or perspiring  -Avoid sitting or lying in one position for more than 2 hours while in bed  -Keep HOB at 45 degrees     Toileting:  -Offer bedside commode  -Assess for incontinence every 2 hours  -Use incontinent care products after each incontinent episode such as brief    Activity:  -Mobilize patient 3-4 times a day  -Encourage activity and walks on unit  -Encourage or provide ROM exercises   -Turn and reposition patient every 2 Hours  -Use appropriate equipment to lift or move patient in bed  -Instruct/ Assist with weight shifting every 2 when out of bed in chair  -Consider limitation of chair time 2 hour intervals    Skin Care:  -Avoid use of baby powder, tape, friction and shearing, hot water or constrictive clothing  -Relieve pressure over bony prominences using pillows  -Do not massage red bony areas    Next Steps:  -Teach patient strategies to minimize risks such as walker  -Consider consults to  interdisciplinary teams such as wound  Outcome: Progressing  Goal: Incision(s), wounds(s) or drain site(s) healing without S/S of infection  Description: INTERVENTIONS  - Assess and document dressing, incision, wound bed, drain sites and surrounding tissue  - Provide patient and family education  - Perform skin care/dressing changes every PRN  Outcome: Progressing     Problem: MUSCULOSKELETAL - ADULT  Goal: Maintain or return mobility to safest level of function  Description: INTERVENTIONS:  - Assess patient's ability to carry out ADLs; assess patient's baseline for ADL function and identify physical deficits which impact ability to perform ADLs (bathing, care of mouth/teeth, toileting, grooming, dressing, etc )  - Assess/evaluate cause of self-care deficits   - Assess range of motion  - Assess patient's mobility  - Assess patient's need for assistive devices and provide as appropriate  - Encourage maximum independence but intervene and supervise when necessary  - Involve family in performance of ADLs  - Assess for home care needs following discharge   - Consider OT consult to assist with ADL evaluation and planning for discharge  - Provide patient education as appropriate  Outcome: Progressing

## 2022-10-30 NOTE — PLAN OF CARE
Problem: PHYSICAL THERAPY ADULT  Goal: Performs mobility at highest level of function for planned discharge setting  See evaluation for individualized goals  Description: Treatment/Interventions: Functional transfer training, LE strengthening/ROM, Elevations, Therapeutic exercise, Cognitive reorientation, Patient/family training, Equipment eval/education, Bed mobility, Gait training, Compensatory technique education, Continued evaluation, Spoke to nursing, Spoke to case management, Spoke to MD, OT, ST  Equipment Recommended: Deleon Seroumarant (if patient does not own)       See flowsheet documentation for full assessment, interventions and recommendations  Outcome: Progressing  Note: Prognosis: Fair  Problem List: Decreased range of motion, Decreased endurance, Impaired balance, Decreased mobility, Decreased coordination, Decreased cognition, Impaired judgement, Pain, Decreased safety awareness  Assessment: Pt  improving well with mobility  Pt  needed assist with ambulation as gait noted with steppage gait and scissoring with NBOS  Cues to correct gait deviation  Noted ambulation with SPC and min A with least instability and patient reported "It was easier with cane" Assist in negotiating RW during amb  however quality of RW wheels noted to have alignment problems  Pt  needed Min A for supine to sit transfer to the R side  Recommended to patient to get her family to bring foot wear  Pt  reported she has pain in her R foot at the dorsal forefoot and the lateral and plantar areas  Reported she has had that pain for a while and is due to neuropathy and pain is only in her R foot  Recommended patient to ambulate with staff on the unit with RW when able  Pt  agreeable   Pt  reported RLE fatigue post long ambulation (260ft) and needed seated rest  Will continue to follow during the stay to maximize functional mobility  Barriers to Discharge: Decreased caregiver support, Inaccessible home environment  Barriers to Discharge Comments: unable to identify at this time  PT Discharge Recommendation: Post acute rehabilitation services    See flowsheet documentation for full assessment

## 2022-10-30 NOTE — PHYSICAL THERAPY NOTE
Physical Therapy Treatment Note     10/30/22 1429   PT Last Visit   PT Visit Date 10/30/22   Note Type   Note Type Treatment   Pain Assessment   Pain Assessment Tool 0-10   Pain Score 5   Pain Location/Orientation Orientation: Right;[place] Foot   Restrictions/Precautions   Weight Bearing Precautions Per Order No   Other Precautions 1:1;Fall Risk;Pain;Telemetry;Cognitive   General   Chart Reviewed Yes   Family/Caregiver Present No   Subjective   Subjective Pt  in bed upon entry  Pt  conversational and appropriate responses except when asked who she lives with she reported her dad  Agreable to PT  Bed Mobility   Supine to Sit 4  Minimal assistance   Additional items Assist x 1; Increased time required;LE management;Verbal cues  (HOB flat and no bed rails and transfer to the R)   Transfers   Sit to Stand 5  Supervision   Additional items Assist x 1   Stand to Sit 5  Supervision   Additional items Assist x 1   Stand pivot 4  Minimal assistance   Additional items Assist x 1; Increased time required;Verbal cues   Ambulation/Elevation   Gait pattern Steppage;Narrow SOY;Scissoring;Decreased foot clearance; Improper Weight shift; Short stride; Ataxia; Inconsistent yan;Decreased heel strike;Decreased toe off   Gait Assistance 4  Minimal assist   Additional items Assist x 1;Verbal cues   Assistive Device Rolling walker;SPC   Distance 130ft x 2 with RW, 30ft with SPC, 15ft with HHA   Balance   Static Sitting Good   Dynamic Sitting Fair   Static Standing Fair   Dynamic Standing Poor +   Ambulatory Poor +   Endurance Deficit   Endurance Deficit Yes   Endurance Deficit Description LE fatigue   Activity Tolerance   Activity Tolerance Patient tolerated treatment well   Nurse Made Aware Yes   Assessment   Prognosis Fair   Problem List Decreased range of motion;Decreased endurance; Impaired balance;Decreased mobility; Decreased coordination;Decreased cognition; Impaired judgement;Pain;Decreased safety awareness   Assessment Pt  improving well with mobility  Pt  needed assist with ambulation as gait noted with steppage gait and scissoring with NBOS  Cues to correct gait deviation  Noted ambulation with SPC and min A with least instability and patient reported "It was easier with cane" Assist in negotiating RW during amb  however quality of RW wheels noted to have alignment problems  Pt  needed Min A for supine to sit transfer to the R side  Recommended to patient to get her family to bring foot wear  Pt  reported she has pain in her R foot at the dorsal forefoot and the lateral and plantar areas  Reported she has had that pain for a while and is due to neuropathy and pain is only in her R foot  Recommended patient to ambulate with staff on the unit with RW when able  Pt  agreeable  Pt  reported RLE fatigue post long ambulation (260ft) and needed seated rest  Will continue to follow during the stay to maximize functional mobility   Barriers to Discharge Decreased caregiver support; Inaccessible home environment   Goals   Patient Goals None reported   STG Expiration Date 11/02/22   PT Treatment Day 14   Plan   Treatment/Interventions Functional transfer training;Gait training;Bed mobility; Equipment eval/education;Patient/family training;Spoke to nursing   Progress Slow progress, cognitive deficits   PT Frequency Other (Comment)  (2-4x/wek)   Recommendation   PT Discharge Recommendation Post acute rehabilitation services   AM-PAC Basic Mobility Inpatient   Turning in Bed Without Bedrails 3   Lying on Back to Sitting on Edge of Flat Bed 3   Moving Bed to Chair 3   Standing Up From Chair 4   Walk in Room 3   Climb 3-5 Stairs 3   Basic Mobility Inpatient Raw Score 19   Basic Mobility Standardized Score 42 48   Turning Head Towards Sound 4   Follow Simple Instructions 3   Low Function Basic Mobility Raw Score 26   Low Function Basic Mobility Standardized Score 41 2   Highest Level Of Mobility   JH-HLM Goal 6: Walk 10 steps or more   JH-HLM Achieved 8: Walk 250 feet ot more   End of Consult   Patient Position at End of Consult Seated edge of bed; All needs within reach; Other (comment)  (1:1)         Garett Reardon AM-PAC basic mobility standardized score less than 42 9 suggest the patient may benefit from discharge to post-acute rehab services

## 2022-10-31 ENCOUNTER — BMR PREADMISSION ASSESSMENT (OUTPATIENT)
Dept: ADMISSIONS | Facility: REHABILITATION | Age: 52
End: 2022-10-31
Payer: COMMERCIAL

## 2022-10-31 RX ADMIN — GABAPENTIN 300 MG: 300 CAPSULE ORAL at 08:05

## 2022-10-31 RX ADMIN — VENLAFAXINE HYDROCHLORIDE 187.5 MG: 37.5 CAPSULE, EXTENDED RELEASE ORAL at 08:07

## 2022-10-31 RX ADMIN — GABAPENTIN 300 MG: 300 CAPSULE ORAL at 21:35

## 2022-10-31 RX ADMIN — METOPROLOL TARTRATE 25 MG: 25 TABLET, FILM COATED ORAL at 21:35

## 2022-10-31 RX ADMIN — ALPRAZOLAM 0.5 MG: 0.5 TABLET ORAL at 02:29

## 2022-10-31 RX ADMIN — HEPARIN SODIUM 5000 UNITS: 5000 INJECTION INTRAVENOUS; SUBCUTANEOUS at 05:21

## 2022-10-31 RX ADMIN — Medication 1 PATCH: at 08:06

## 2022-10-31 RX ADMIN — GABAPENTIN 300 MG: 300 CAPSULE ORAL at 12:12

## 2022-10-31 RX ADMIN — Medication 250 MG: at 08:05

## 2022-10-31 RX ADMIN — PSYLLIUM HUSK 1 PACKET: 3.4 POWDER ORAL at 08:06

## 2022-10-31 RX ADMIN — AMLODIPINE BESYLATE 10 MG: 10 TABLET ORAL at 08:05

## 2022-10-31 RX ADMIN — METOPROLOL TARTRATE 25 MG: 25 TABLET, FILM COATED ORAL at 08:05

## 2022-10-31 RX ADMIN — MICONAZOLE NITRATE 1 APPLICATION: 20 CREAM TOPICAL at 08:42

## 2022-10-31 RX ADMIN — HEPARIN SODIUM 5000 UNITS: 5000 INJECTION INTRAVENOUS; SUBCUTANEOUS at 13:58

## 2022-10-31 RX ADMIN — QUETIAPINE FUMARATE 50 MG: 25 TABLET ORAL at 08:05

## 2022-10-31 RX ADMIN — QUETIAPINE FUMARATE 50 MG: 25 TABLET ORAL at 17:21

## 2022-10-31 RX ADMIN — Medication 250 MG: at 17:21

## 2022-10-31 RX ADMIN — MICONAZOLE NITRATE 1 APPLICATION: 20 CREAM TOPICAL at 17:24

## 2022-10-31 RX ADMIN — ACETAMINOPHEN 650 MG: 325 TABLET, FILM COATED ORAL at 21:35

## 2022-10-31 RX ADMIN — ACETAMINOPHEN 650 MG: 325 TABLET, FILM COATED ORAL at 05:22

## 2022-10-31 RX ADMIN — ALPRAZOLAM 0.5 MG: 0.5 TABLET ORAL at 21:36

## 2022-10-31 RX ADMIN — GABAPENTIN 300 MG: 300 CAPSULE ORAL at 17:21

## 2022-10-31 RX ADMIN — HEPARIN SODIUM 5000 UNITS: 5000 INJECTION INTRAVENOUS; SUBCUTANEOUS at 21:35

## 2022-10-31 NOTE — ASSESSMENT & PLAN NOTE
MSSA bacteremia, likely due to phlebitis  Resolved  Echo with no vegetation  Completed abx 10/26/22   Repeat blood cultures 2 week from completion of antibiotics per ID recommendation

## 2022-10-31 NOTE — ASSESSMENT & PLAN NOTE
· Felt to be multifactorial due to acute kidney injury, on top morphine and gabapentin use, infection  · Interestingly her MRI was noted to have severe periventricular and white matter hyperintensities which will need 3 month follow-up  · Overall improving but still noted to be impulsive and is a high fall risk   · She continues to require one-to-one observation    · Awaiting inpatient rehab placement

## 2022-10-31 NOTE — PLAN OF CARE
Problem: PAIN - ADULT  Goal: Verbalizes/displays adequate comfort level or baseline comfort level  Description: Interventions:  - Encourage patient to monitor pain and request assistance  - Assess pain using appropriate pain scale  - Administer analgesics based on type and severity of pain and evaluate response  - Implement non-pharmacological measures as appropriate and evaluate response  - Consider cultural and social influences on pain and pain management  - Notify physician/advanced practitioner if interventions unsuccessful or patient reports new pain  Outcome: Progressing     Problem: INFECTION - ADULT  Goal: Absence or prevention of progression during hospitalization  Description: INTERVENTIONS:  - Assess and monitor for signs and symptoms of infection  - Monitor lab/diagnostic results  - Monitor all insertion sites, i e  indwelling lines, tubes, and drains  - Monitor endotracheal if appropriate and nasal secretions for changes in amount and color  - [place] appropriate cooling/warming therapies per order  - Administer medications as ordered  - Instruct and encourage patient and family to use good hand hygiene technique  - Identify and instruct in appropriate isolation precautions for identified infection/condition  Outcome: Progressing     Problem: SAFETY ADULT  Goal: Patient will remain free of falls  Description: INTERVENTIONS:  - Educate patient/family on patient safety including physical limitations  - Instruct patient to call for assistance with activity   - Consult OT/PT to assist with strengthening/mobility   - Keep Call bell within reach  - Keep bed low and locked with side rails adjusted as appropriate  - Keep care items and personal belongings within reach  - Initiate and maintain comfort rounds  - Make Fall Risk Sign visible to staff  - Offer Toileting every 2 Hours, in advance of need  - Initiate/Maintain bed alarm  - Obtain necessary fall risk management equipment: walker  - Apply yellow socks and bracelet for high fall risk patients  - Consider moving patient to room near nurses station  Outcome: Progressing  Goal: Maintain or return to baseline ADL function  Description: INTERVENTIONS:  -  Assess patient's ability to carry out ADLs; assess patient's baseline for ADL function and identify physical deficits which impact ability to perform ADLs (bathing, care of mouth/teeth, toileting, grooming, dressing, etc )  - Assess/evaluate cause of self-care deficits   - Assess range of motion  - Assess patient's mobility; develop plan if impaired  - Assess patient's need for assistive devices and provide as appropriate  - Encourage maximum independence but intervene and supervise when necessary  - Involve family in performance of ADLs  - Assess for home care needs following discharge   - Consider OT consult to assist with ADL evaluation and planning for discharge  - Provide patient education as appropriate  Outcome: Progressing  Goal: Maintains/Returns to pre admission functional level  Description: INTERVENTIONS:  - Perform BMAT or MOVE assessment daily    - Set and communicate daily mobility goal to care team and patient/family/caregiver  - Collaborate with rehabilitation services on mobility goals if consulted  - Perform Range of Motion 4-6 times a day  - Reposition patient every 2 hours    - Dangle patient 3-4 times a day  - Stand patient 3 times a day  - Ambulate patient 3-4 times a day  - Out of bed for toileting  - Record patient progress and toleration of activity level   Outcome: Progressing     Problem: DISCHARGE PLANNING  Goal: Discharge to home or other facility with appropriate resources  Description: INTERVENTIONS:  - Identify barriers to discharge w/patient and caregiver  - Arrange for needed discharge resources and transportation as appropriate  - Identify discharge learning needs (meds, wound care, etc )  - Arrange for interpretive services to assist at discharge as needed  - Refer to Case Management Department for coordinating discharge planning if the patient needs post-hospital services based on physician/advanced practitioner order or complex needs related to functional status, cognitive ability, or social support system  Outcome: Progressing     Problem: Knowledge Deficit  Goal: Patient/family/caregiver demonstrates understanding of disease process, treatment plan, medications, and discharge instructions  Description: Complete learning assessment and assess knowledge base    Interventions:  - Provide teaching at level of understanding  - Provide teaching via preferred learning methods  Outcome: Progressing     Problem: Potential for Falls  Goal: Patient will remain free of falls  Description: INTERVENTIONS:  - Educate patient/family on patient safety including physical limitations  - Instruct patient to call for assistance with activity   - Consult OT/PT to assist with strengthening/mobility   - Keep Call bell within reach  - Keep bed low and locked with side rails adjusted as appropriate  - Keep care items and personal belongings within reach  - Initiate and maintain comfort rounds  - Make Fall Risk Sign visible to staff  - Offer Toileting every 2 Hours, in advance of need  - Initiate/Maintain bed alarm  - Obtain necessary fall risk management equipment: walker  - Apply yellow socks and bracelet for high fall risk patients  - Consider moving patient to room near nurses station  Outcome: Progressing     Problem: MOBILITY - ADULT  Goal: Maintain or return to baseline ADL function  Description: INTERVENTIONS:  -  Assess patient's ability to carry out ADLs; assess patient's baseline for ADL function and identify physical deficits which impact ability to perform ADLs (bathing, care of mouth/teeth, toileting, grooming, dressing, etc )  - Assess/evaluate cause of self-care deficits   - Assess range of motion  - Assess patient's mobility; develop plan if impaired  - Assess patient's need for assistive devices and provide as appropriate  - Encourage maximum independence but intervene and supervise when necessary  - Involve family in performance of ADLs  - Assess for home care needs following discharge   - Consider OT consult to assist with ADL evaluation and planning for discharge  - Provide patient education as appropriate  Outcome: Progressing  Goal: Maintains/Returns to pre admission functional level  Description: INTERVENTIONS:  - Perform BMAT or MOVE assessment daily    - Set and communicate daily mobility goal to care team and patient/family/caregiver  - Collaborate with rehabilitation services on mobility goals if consulted  - Perform Range of Motion 4-6 times a day  - Reposition patient every 2 hours  - Dangle patient 3-4 times a day  - Stand patient 3 times a day  - Ambulate patient 3-4 times a day  - Out of bed for toileting  - Record patient progress and toleration of activity level   Outcome: Progressing     Problem: Prexisting or High Potential for Compromised Skin Integrity  Goal: Skin integrity is maintained or improved  Description: INTERVENTIONS:  - Identify patients at risk for skin breakdown  - Assess and monitor skin integrity  - Assess and monitor nutrition and hydration status  - Monitor labs   - Assess for incontinence   - Turn and reposition patient  - Assist with mobility/ambulation  - Relieve pressure over bony prominences  - Avoid friction and shearing  - Provide appropriate hygiene as needed including keeping skin clean and dry  - Evaluate need for skin moisturizer/barrier cream  - Collaborate with interdisciplinary team   - Patient/family teaching  - Consider wound care consult   Outcome: Progressing     Problem: Nutrition/Hydration-ADULT  Goal: Nutrient/Hydration intake appropriate for improving, restoring or maintaining nutritional needs  Description: Monitor and assess patient's nutrition/hydration status for malnutrition   Collaborate with interdisciplinary team and initiate plan and interventions as ordered  Monitor patient's weight and dietary intake as ordered or per policy  Utilize nutrition screening tool and intervene as necessary  Determine patient's food preferences and provide high-protein, high-caloric foods as appropriate  INTERVENTIONS:  - Monitor oral intake, urinary output, labs, and treatment plans  - Assess nutrition and hydration status and recommend course of action  - Evaluate amount of meals eaten  - Assist patient with eating if necessary   - Allow adequate time for meals  - Recommend/ encourage appropriate diets, oral nutritional supplements, and vitamin/mineral supplements  - Order, calculate, and assess calorie counts as needed  - Recommend, monitor, and adjust tube feedings and TPN/PPN based on assessed needs  - Assess need for intravenous fluids  - Provide specific nutrition/hydration education as appropriate  - Include patient/family/caregiver in decisions related to nutrition  Outcome: Progressing     Problem: NEUROSENSORY - ADULT  Goal: Achieves maximal functionality and self care  Description: INTERVENTIONS  - Monitor swallowing and airway patency with patient fatigue and changes in neurological status  - Encourage and assist patient to increase activity and self care     - Encourage visually impaired, hearing impaired and aphasic patients to use assistive/communication devices  Outcome: Progressing  Goal: Achieves stable or improved neurological status  Description: INTERVENTIONS  - Monitor and report changes in neurological status  - Monitor vital signs such as temperature, blood pressure, glucose, and any other labs ordered   - Initiate measures to prevent increased intracranial pressure  - Monitor for seizure activity and implement precautions if appropriate      Outcome: Progressing     Problem: CARDIOVASCULAR - ADULT  Goal: Maintains optimal cardiac output and hemodynamic stability  Description: INTERVENTIONS:  - Monitor I/O, vital signs and rhythm  - Monitor for S/S and trends of decreased cardiac output  - Administer and titrate ordered vasoactive medications to optimize hemodynamic stability  - Assess quality of pulses, skin color and temperature  - Assess for signs of decreased coronary artery perfusion  - Instruct patient to report change in severity of symptoms  Outcome: Progressing     Problem: RESPIRATORY - ADULT  Goal: Achieves optimal ventilation and oxygenation  Description: INTERVENTIONS:  - Assess for changes in respiratory status  - Assess for changes in mentation and behavior  - Position to facilitate oxygenation and minimize respiratory effort  - Oxygen administered by appropriate delivery if ordered  - Initiate smoking cessation education as indicated  - Encourage broncho-pulmonary hygiene including cough, deep breathe, Incentive Spirometry  - Assess the need for suctioning and aspirate as needed  - Assess and instruct to report SOB or any respiratory difficulty  - Respiratory Therapy support as indicated  Outcome: Progressing     Problem: GASTROINTESTINAL - ADULT  Goal: Maintains adequate nutritional intake  Description: INTERVENTIONS:  - Monitor percentage of each meal consumed  - Identify factors contributing to decreased intake, treat as appropriate  - Assist with meals as needed  - Monitor I&O, weight, and lab values if indicated  - Obtain nutrition services referral as needed  Outcome: Progressing     Problem: METABOLIC, FLUID AND ELECTROLYTES - ADULT  Goal: Electrolytes maintained within normal limits  Description: INTERVENTIONS:  - Monitor labs and assess patient for signs and symptoms of electrolyte imbalances  - Administer electrolyte replacement as ordered  - Monitor response to electrolyte replacements, including repeat lab results as appropriate  - Instruct patient on fluid and nutrition as appropriate  Outcome: Progressing  Goal: Fluid balance maintained  Description: INTERVENTIONS:  - Monitor labs   - Monitor I/O and WT  - Instruct patient on fluid and nutrition as appropriate  - Assess for signs & symptoms of volume excess or deficit  Outcome: Progressing     Problem: SKIN/TISSUE INTEGRITY - ADULT  Goal: Skin Integrity remains intact(Skin Breakdown Prevention)  Description: Assess:  -Perform Erickson assessment every shift  -Clean and moisturize skin every day  -Inspect skin when repositioning, toileting, and assisting with ADLS  -Assess extremities for adequate circulation and sensation     Bed Management:  -Have minimal linens on bed & keep smooth, unwrinkled  -Change linens as needed when moist or perspiring  -Avoid sitting or lying in one position for more than 2 hours while in bed  -Keep HOB at 45 degrees     Toileting:  -Offer bedside commode  -Assess for incontinence every 2 hours  -Use incontinent care products after each incontinent episode such as brief    Activity:  -Mobilize patient 3-4 times a day  -Encourage activity and walks on unit  -Encourage or provide ROM exercises   -Turn and reposition patient every 2 Hours  -Use appropriate equipment to lift or move patient in bed  -Instruct/ Assist with weight shifting every 2 when out of bed in chair  -Consider limitation of chair time 2 hour intervals    Skin Care:  -Avoid use of baby powder, tape, friction and shearing, hot water or constrictive clothing  -Relieve pressure over bony prominences using pillows  -Do not massage red bony areas    Next Steps:  -Teach patient strategies to minimize risks such as walker  -Consider consults to  interdisciplinary teams such as wound  Outcome: Progressing  Goal: Incision(s), wounds(s) or drain site(s) healing without S/S of infection  Description: INTERVENTIONS  - Assess and document dressing, incision, wound bed, drain sites and surrounding tissue  - Provide patient and family education  - Perform skin care/dressing changes every PRN  Outcome: Progressing     Problem: MUSCULOSKELETAL - ADULT  Goal: Maintain or return mobility to safest level of function  Description: INTERVENTIONS:  - Assess patient's ability to carry out ADLs; assess patient's baseline for ADL function and identify physical deficits which impact ability to perform ADLs (bathing, care of mouth/teeth, toileting, grooming, dressing, etc )  - Assess/evaluate cause of self-care deficits   - Assess range of motion  - Assess patient's mobility  - Assess patient's need for assistive devices and provide as appropriate  - Encourage maximum independence but intervene and supervise when necessary  - Involve family in performance of ADLs  - Assess for home care needs following discharge   - Consider OT consult to assist with ADL evaluation and planning for discharge  - Provide patient education as appropriate  Outcome: Progressing

## 2022-10-31 NOTE — PROGRESS NOTES
[address] [facility]  Progress Note - Tracee Mcclelland 1970, 45 y o  female MRN: [id number]  Unit/Bed#: [facility] 2 -01 Encounter: [id number]  Primary Care Provider: Tete Byrne MD   Date and time admitted to hospital: 9/19/2022  8:04 AM    * Toxic metabolic encephalopathy  Assessment & Plan  · Felt to be multifactorial due to acute kidney injury, on top morphine and gabapentin use, infection  · Interestingly her MRI was noted to have severe periventricular and white matter hyperintensities which will need 3 month follow-up  · Overall improving but still noted to be impulsive and is a high fall risk   · She continues to require one-to-one observation  · Awaiting inpatient rehab placement    Bacteremia due to methicillin susceptible Staphylococcus aureus (MSSA)  Assessment & Plan  MSSA bacteremia, likely due to phlebitis  Resolved  Echo with no vegetation  Completed abx 10/26/22   Repeat blood cultures 2 week from completion of antibiotics per ID recommendation    Traumatic rhabdomyolysis [facility])  Assessment & Plan  · Secondary to fall, patient had been laying in bed for approximately 4 days  · She was subsequently brought to the hospital and found to be in acute rhabdomyolysis with ANUJA  · Had been on multiple sedating medications including MS Contin as well as gabapentin, which likely were contributing to sedation  · Now resolved    Abnormal CT of the chest  Assessment & Plan  CT of the chest on 10/01 showed bilateral opacities and pleural effusion  Repeat chest x-ray on 10/5 showed resolution of the pulmonary edema and effusion  Resolved    Transaminitis  Assessment & Plan  Transaminitis due to rhabdomyolysis, resolved    ARF (acute renal failure) ([facility] [address] )  Assessment & Plan  ANUJA due to rhabdomyolysis, resolved      Depression  Assessment & Plan  Stable    Continue Effexor 187 5 mg daily and Seroquel 50 mg b i d       VTE Pharmacologic Prophylaxis:   Pharmacologic: Heparin  Mechanical VTE Prophylaxis in Place: No    Patient Centered Rounds: I have performed bedside rounds with nursing staff today  Discussions with Specialists or Other Care Team Provider:  Hospital course reviewed    Education and Discussions with Family / Patient:  Spoke with  at bedside    Time Spent for Care: 20 minutes  More than 50% of total time spent on counseling and coordination of care as described above  Current Length of Stay: 42 day(s)    Current Patient Status: Inpatient   Certification Statement: The patient will continue to require additional inpatient hospital stay due to Placement    Discharge Plan:  DC to rehab when available    Code Status: Level 1 - Full Code      Subjective:   According to her , she has improved over the past few weeks  She continues to have one-to-one observation due to impulsivity and high fall risk  Patient with Poor recall of recent events    Objective:     Vitals:   Temp (24hrs), Av 4 °F (36 9 °C), Min:97 9 °F (36 6 °C), Max:99 1 °F (37 3 °C)    Temp:  [97 9 °F (36 6 °C)-99 1 °F (37 3 °C)] 98 2 °F (36 8 °C)  HR:  [] 94  Resp:  [16-18] 16  BP: (109-123)/(73-85) 123/85  SpO2:  [96 %-97 %] 96 %  Body mass index is 18 4 kg/m²  Input and Output Summary (last 24 hours):     No intake or output data in the 24 hours ending 10/31/22 152    Physical Exam:     Physical Exam  HENT:      Head: Atraumatic  Nose: No rhinorrhea  Eyes:      General: No scleral icterus  Cardiovascular:      Rate and Rhythm: Normal rate and regular rhythm  Pulmonary:      Effort: Pulmonary effort is normal  No respiratory distress  Breath sounds: No wheezing or rales  Abdominal:      General: Abdomen is flat  There is no distension  Palpations: Abdomen is soft  Tenderness: There is no abdominal tenderness  Musculoskeletal:      Cervical back: Neck supple  Skin:     General: Skin is warm and dry  Coloration: Skin is not jaundiced or pale     Neurological: Mental Status: She is alert  She is disoriented  Psychiatric:         Mood and Affect: Mood normal      Additional Data:     Labs:    Results from last 7 days   Lab Units 10/26/22  1049   WBC Thousand/uL 6 37   HEMOGLOBIN g/dL 10 5*   HEMATOCRIT % 33 8*   PLATELETS Thousands/uL 229     Results from last 7 days   Lab Units 10/26/22  1049   SODIUM mmol/L 140   POTASSIUM mmol/L 3 5   CHLORIDE mmol/L 104   CO2 mmol/L 25   BUN mg/dL 12   CREATININE mg/dL 0 75   ANION GAP mmol/L 11   CALCIUM mg/dL 9 1   GLUCOSE RANDOM mg/dL 138     * I Have Reviewed All Lab Data Listed Above  * Additional Pertinent Lab Tests Reviewed:  All Labs Within Last 24 Hours Reviewed    Imaging:    Imaging Reports Reviewed Today Include:  CT, chest x-ray      Recent Cultures (last 7 days):           Last 24 Hours Medication List:   Current Facility-Administered Medications   Medication Dose Route Frequency Provider Last Rate   • acetaminophen  650 mg Oral Q6H PRN Dick Dillard DO     • ALPRAZolam  0 5 mg Oral HS PRN Della Hawkins MD     • amLODIPine  10 mg Oral Daily Macho Sadler MD     • gabapentin  300 mg Oral 4x Daily Fern Parker DO     • heparin (porcine)  5,000 Units Subcutaneous Q8H [facility] Atul Adan DO     • levalbuterol  1 25 mg Nebulization Q4H PRN Jewels Jasso DO     • loperamide  2 mg Oral 4x Daily PRN Della Hawkins MD     • metoprolol tartrate  25 mg Oral Q12H [facility] Inder Abreu MD     • VANDANA ANTIFUNGAL   Topical BID Ivar Saver Emmie Jenkins DO     • nicotine  1 patch Transdermal Daily Atul Adan DO     • OLANZapine  5 mg Oral Q6H PRN Jewels Jasso DO     • ondansetron  4 mg Intravenous Q4H PRN Dick Dillard DO     • psyllium  1 packet Oral Daily Fern Parker DO     • QUEtiapine  50 mg Oral BID Della Hawkins MD     • saccharomyces boulardii  250 mg Oral BID Della Hawkins MD     • venlafaxine  187 5 mg Oral Daily Mindy Amador DO          Today, Patient Was Seen By: Levan Brittle Avril Petersen    ** Please Note: Dictation voice to text software may have been used in the creation of this document   **

## 2022-10-31 NOTE — ASSESSMENT & PLAN NOTE
CT of the chest on 10/01 showed bilateral opacities and pleural effusion  Repeat chest x-ray on 10/5 showed resolution of the pulmonary edema and effusion  Resolved

## 2022-11-01 VITALS — WEIGHT: 113.98 LBS | HEIGHT: 66 IN | BODY MASS INDEX: 18.32 KG/M2

## 2022-11-01 PROBLEM — N17.9 ARF (ACUTE RENAL FAILURE) (CMS/HCC): Status: ACTIVE | Noted: 2022-09-19

## 2022-11-01 PROBLEM — J69.0 ASPIRATION PNEUMONITIS (CMS/HCC): Status: ACTIVE | Noted: 2022-10-01

## 2022-11-01 PROBLEM — J81.1 PULMONARY EDEMA: Status: ACTIVE | Noted: 2022-09-30

## 2022-11-01 PROBLEM — R74.01 TRANSAMINITIS: Status: ACTIVE | Noted: 2022-09-19

## 2022-11-01 PROBLEM — G92.8 TOXIC METABOLIC ENCEPHALOPATHY: Status: ACTIVE | Noted: 2022-09-19

## 2022-11-01 PROBLEM — T79.6XXA TRAUMATIC RHABDOMYOLYSIS (CMS/HCC): Status: ACTIVE | Noted: 2022-09-19

## 2022-11-01 PROBLEM — E44.0 MODERATE PROTEIN-CALORIE MALNUTRITION (CMS/HCC): Status: ACTIVE | Noted: 2022-10-09

## 2022-11-01 PROBLEM — R78.81 BACTEREMIA DUE TO METHICILLIN SUSCEPTIBLE STAPHYLOCOCCUS AUREUS (MSSA): Status: ACTIVE | Noted: 2022-09-27

## 2022-11-01 PROBLEM — B95.61 BACTEREMIA DUE TO METHICILLIN SUSCEPTIBLE STAPHYLOCOCCUS AUREUS (MSSA): Status: ACTIVE | Noted: 2022-09-27

## 2022-11-01 PROBLEM — R22.31 LOCALIZED SWELLING OF RIGHT UPPER EXTREMITY: Status: ACTIVE | Noted: 2022-09-26

## 2022-11-01 PROBLEM — R26.2 AMBULATORY DYSFUNCTION: Status: ACTIVE | Noted: 2022-11-01

## 2022-11-01 RX ORDER — TRAMADOL HYDROCHLORIDE 50 MG/1
50 TABLET ORAL 2 TIMES DAILY PRN
Status: DISCONTINUED | OUTPATIENT
Start: 2022-11-01 | End: 2022-11-07 | Stop reason: HOSPADM

## 2022-11-01 RX ADMIN — METOPROLOL TARTRATE 25 MG: 25 TABLET, FILM COATED ORAL at 21:48

## 2022-11-01 RX ADMIN — HEPARIN SODIUM 5000 UNITS: 5000 INJECTION INTRAVENOUS; SUBCUTANEOUS at 14:17

## 2022-11-01 RX ADMIN — PSYLLIUM HUSK 1 PACKET: 3.4 POWDER ORAL at 08:27

## 2022-11-01 RX ADMIN — Medication 1 PATCH: at 08:34

## 2022-11-01 RX ADMIN — VENLAFAXINE HYDROCHLORIDE 187.5 MG: 37.5 CAPSULE, EXTENDED RELEASE ORAL at 08:29

## 2022-11-01 RX ADMIN — Medication 250 MG: at 17:10

## 2022-11-01 RX ADMIN — GABAPENTIN 300 MG: 300 CAPSULE ORAL at 08:28

## 2022-11-01 RX ADMIN — ACETAMINOPHEN 650 MG: 325 TABLET, FILM COATED ORAL at 04:47

## 2022-11-01 RX ADMIN — Medication 250 MG: at 08:28

## 2022-11-01 RX ADMIN — GABAPENTIN 300 MG: 300 CAPSULE ORAL at 17:10

## 2022-11-01 RX ADMIN — GABAPENTIN 300 MG: 300 CAPSULE ORAL at 21:46

## 2022-11-01 RX ADMIN — HEPARIN SODIUM 5000 UNITS: 5000 INJECTION INTRAVENOUS; SUBCUTANEOUS at 21:46

## 2022-11-01 RX ADMIN — QUETIAPINE FUMARATE 50 MG: 25 TABLET ORAL at 17:10

## 2022-11-01 RX ADMIN — METOPROLOL TARTRATE 25 MG: 25 TABLET, FILM COATED ORAL at 08:28

## 2022-11-01 RX ADMIN — AMLODIPINE BESYLATE 10 MG: 10 TABLET ORAL at 08:28

## 2022-11-01 RX ADMIN — ALPRAZOLAM 0.5 MG: 0.5 TABLET ORAL at 21:46

## 2022-11-01 RX ADMIN — MICONAZOLE NITRATE: 20 CREAM TOPICAL at 17:11

## 2022-11-01 RX ADMIN — HEPARIN SODIUM 5000 UNITS: 5000 INJECTION INTRAVENOUS; SUBCUTANEOUS at 06:15

## 2022-11-01 RX ADMIN — QUETIAPINE FUMARATE 50 MG: 25 TABLET ORAL at 08:28

## 2022-11-01 RX ADMIN — MICONAZOLE NITRATE: 20 CREAM TOPICAL at 08:29

## 2022-11-01 RX ADMIN — GABAPENTIN 300 MG: 300 CAPSULE ORAL at 11:44

## 2022-11-01 NOTE — OCCUPATIONAL THERAPY NOTE
Occupational Therapy Progress Note(time=1020-1100)     Patient Name: Daniel Elise  Today's Date: 11/1/2022  Problem List  Principal Problem:    Toxic metabolic encephalopathy  Active Problems:    Depression    Tobacco abuse    DDD (degenerative disc disease), lumbosacral    ARF (acute renal failure) (HCC)    Transaminitis    Abnormal CT of the chest    Traumatic rhabdomyolysis (HCC)    D-dimer, elevated    Leg edema, right    Localized swelling of right upper extremity    Bacteremia due to methicillin susceptible Staphylococcus aureus (MSSA)    Pulmonary edema    Aspiration pneumonitis (HCC)    Hypokalemia    Hypernatremia    Diarrhea    Mild protein-calorie malnutrition (HCC)    Moderate protein-calorie malnutrition ([facility] [address] )            11/01/22 1020   Note Type   Note Type Treatment   Pain Assessment   Pain Assessment Tool FLACC   Pain Rating: FLACC (Rest) - Face 0   Pain Rating: FLACC (Rest) - Legs 0   Pain Rating: FLACC (Rest) - Activity 0   Pain Rating: FLACC (Rest) - Cry 1   Pain Rating: FLACC (Rest) - Consolability 0   Score: FLACC (Rest) 1   Restrictions/Precautions   Weight Bearing Precautions Per Order No   Other Precautions Fall Risk;1:1;Cognitive; Chair Alarm; Bed Alarm   ADL   Where Assessed Edge of bed   Eating Assistance 5  Supervision/Setup   Grooming Assistance 5  Supervision/Setup   UB Bathing Assistance 5  Supervision/Setup    [address] 5  Supervision/Setup   LB Dressing Assistance 4  Minimal Assistance   Functional Standing Tolerance   Time 4-5mins   Bed Mobility   Rolling R 4  Minimal assistance   Additional items Assist x 1   Rolling L 4  Minimal assistance   Additional items Assist x 1; Increased time required;Verbal cues;LE management   Supine to Sit 4  Minimal assistance   Additional items Assist x 1; Increased time required   Transfers   Sit to Stand 4  Minimal assistance   Additional items Assist x 1; Increased time required;Verbal cues Stand to Sit 4  Minimal assistance   Additional items Assist x 1; Increased time required;Verbal cues   Functional Mobility   Functional Mobility 4  Minimal assistance   Additional Comments x1   Additional items Rolling walker   Subjective   Subjective "I could go live with my parents "   Cognition   Overall Cognitive Status Impaired   Arousal/Participation Alert   Attention Attends with cues to redirect   Orientation Level Oriented to person;Oriented to place; Disoriented to time;Disoriented to situation   Memory Decreased short term memory;Decreased recall of recent events;Decreased recall of precautions;Decreased recall of biographical information   Following Commands Follows one step commands with increased time or repetition   Cognition Assessment Tools ACLS   Score 4 2   Activity Tolerance   Activity Tolerance Patient tolerated treatment well   Medical Staff Made Aware nsg, P T  Assessment   Assessment Pt seen for 40 min tx session with focus on functional balance, functional mobility, ADL status, transfer safety, and cognition  Pt able to tolerate OOB mobility; sitting balance=f+/f, standing balance=f/f-  Pt required verbal/physical cues to maintain transfer safety  Pt much more alert today, improving her interaction with tx intervention  Pt demonstrating slight need for assistance with her ADLs  Cognitive deficits noted--i e orientation, memory, problem-solving, judgement/safety, attention, higher-level direction following  See below noted for details of 4 2 ACLs score and updated goals  The patient's raw score on the AM-PAC Daily Activity inpatient short form is 18, standardized score is 38 66, less than 39 4  Patients at this level are likely to benefit from discharge to post-acute rehabilitation services  Please refer to the recommendation of the Occupational Therapist for safe discharge planning     Plan   Treatment Interventions ADL retraining;Functional transfer training;Cognitive reorientation;Patient/family training;Equipment evaluation/education; Compensatory technique education;Continued evaluation   Recommendation   OT Discharge Recommendation Post acute rehabilitation services   AM-PAC Daily Activity Inpatient   Lower Body Dressing 3   Bathing 3   Toileting 3   Upper Body Dressing 3   Grooming 3   Eating 3   Daily Activity Raw Score 18   Daily Activity Standardized Score (Calc for Raw Score >=11) 38 66   Turning Head Towards Sound 3   Follow Simple Instructions 3   Low Function Daily Activity Raw Score 24   Low Function Daily Activity Standardized Score 36 94   AM-PAC Applied Cognition Inpatient   Following a Speech/Presentation 3   Understanding Ordinary Conversation 2   Taking Medications 2   Remembering Where Things Are Placed or Put Away 1   Remembering List of 4-5 Errands 1   Taking Care of Complicated Tasks 1   Applied Cognition Raw Score 10   Applied Cognition Standardized Score 24 98   Updated goals:   Pt will demonstrate proper walker/transfer safety 100% of the times  Pt will demonstrate improved functional balance by 1 grade to assist with ADLs  Pt will demonstrate mod I with her UE and LE bathing/dressing  Pt will demonstrate mod I with her bed mobility and sit-stand transfer to assist with ADLs  Pt will demonstrate improved b/l UE strength by 1/2 MM grade to assist with ADLs  Pt will verbalize 2-3 personal cognitive deficits and their appropriate compensation techniques  4 2    Administered Elena [place] Cognitive Level Screen (ACLS)  Pt scored 4 2/6 0 indicating 24 Hour supervision is recommended to remove dangerous objects outside the visual field and to solve problems due to minor changes in the environment  Behavior:  Recognizes errors  Identifies problems  Asks for Day/Date  Sequences one activity at a time  Processing speed is slow and may take extra time to complete tasks  Memorization will be very slow    Requires long term repetitive training for new tasks  Keep directions short and concise  Grooming:  Initiates and completes with supplies from familiar accessible locations  Stops and asks for help when an error is made  Expect cuts with use of straight razor  Check every few minutes if left alone  Allow ample time for completion of tasks  Dressing:  Selects clothing items based on striking features like color  May choose to wear a favorite item all the time  May argue with suggestions to change selections  Bathing:  Recognizes need for a bath and may ask if time is appropriate  Collects supplies from a visible location  Follows routine  May miss small hidden areas  Recognizes problem like lack of soap and asks for help  Remove hazards from proximity to bath (ie: electrical appliances)  Walking/exercising:  Ambulates to a familiar location ½ to 1 mile away  May not vary route  May fail to attend to activity or noises outside visual field  May ask for help if lost   May be able to learn a graded exercise program   May become anxious in high stimulus environments (malls, airports, casinos)  May resist going to certain places  Eating:  Initiates coming to table at routine times  Uses utensils  Recognizes errors and asks for help  Attempts to comply with social standards  May have trouble waiting for others  Assist with handling hot foods/liquids  Monitor compliance with special diet  Toileting:  Recognizes difficulties that interfere with toileting routine and asks for help  May be able to report change in bowel or bladder habits  May take much longer than average to complete toileting  Medications:  Initiates taking familiar dose at regular time of day  May not be able to open container and may have incorrect ideas of effects that lead to noncompliance  May not seek assist for problems encountered  Supervise to ensure compliance  Use of adaptive equipment:  Needs help with more complex equipment    May need assistance with fasteners that require fine motor skills  Does not understand the potential hazards of incorrect use  May forget to use equipment  Housekeeping:   Initiates and completes a routine of housekeeping activities  May be able to set priorities but may become fixated on a priority  Cleans, polishes and sweeps one feature at a time  Check for quality of cleaning results  Food preparation:  Does not plan for long term food needs  May go to store repeatedly whenever food is desired  May search cabinet for particular food item and ask for help when items are not found  May prepare a simple meal but may not recognize problems  Store all undesirable equipment away from view  Do not leave alone when using dangerous tools/equipment  Spending money:  May be able to set a priority for an expense that is highly valued  May become fixated on item  May resist help  Shopping:  May go to familiar shops but does not make a list or compare prices  Looks in familiar locations for an item  May ask for help when not found  May insist on purchasing item that is unrealistic or impractical   May resist help  May be anxious in high stimulus environments  Laundry:  May sort laundry by color or other simple striking cues  May view as a priority and initiate regularly  Recognizes errors like too much soap but cannot offer solutions  Traveling:  May go through actions slowly  Can sit unaccompanied up to 1 hour on a bus or train with landmarks as a guide when to get off  Asks for assistance if lost, does not alter familiar routes  May become anxious in high stimulus environments (bus, airport terminals)  Telephone:  Dials a new number written down by checking it 1 digit at a time  Writes down information very slowly  Does not consider a person’s schedule or time of day when calling  Needs assistance to make calls from unfamiliar telephones  May call emergency or familiar numbers excessively    Driving:  Should NOT operate a motor vehicle         [facility]

## 2022-11-01 NOTE — PLAN OF CARE
Problem: PAIN - ADULT  Goal: Verbalizes/displays adequate comfort level or baseline comfort level  Description: Interventions:  - Encourage patient to monitor pain and request assistance  - Assess pain using appropriate pain scale  - Administer analgesics based on type and severity of pain and evaluate response  - Implement non-pharmacological measures as appropriate and evaluate response  - Consider cultural and social influences on pain and pain management  - Notify physician/advanced practitioner if interventions unsuccessful or patient reports new pain  Outcome: Progressing     Problem: INFECTION - ADULT  Goal: Absence or prevention of progression during hospitalization  Description: INTERVENTIONS:  - Assess and monitor for signs and symptoms of infection  - Monitor lab/diagnostic results  - Monitor all insertion sites, i e  indwelling lines, tubes, and drains  - Monitor endotracheal if appropriate and nasal secretions for changes in amount and color  - [place] appropriate cooling/warming therapies per order  - Administer medications as ordered  - Instruct and encourage patient and family to use good hand hygiene technique  - Identify and instruct in appropriate isolation precautions for identified infection/condition  Outcome: Progressing     Problem: SAFETY ADULT  Goal: Patient will remain free of falls  Description: INTERVENTIONS:  - Educate patient/family on patient safety including physical limitations  - Instruct patient to call for assistance with activity   - Consult OT/PT to assist with strengthening/mobility   - Keep Call bell within reach  - Keep bed low and locked with side rails adjusted as appropriate  - Keep care items and personal belongings within reach  - Initiate and maintain comfort rounds  - Make Fall Risk Sign visible to staff  - Offer Toileting every 2 Hours, in advance of need  - Initiate/Maintain bed alarm  - Obtain necessary fall risk management equipment: walker  - Apply yellow socks and bracelet for high fall risk patients  - Consider moving patient to room near nurses station  Outcome: Progressing  Goal: Maintain or return to baseline ADL function  Description: INTERVENTIONS:  -  Assess patient's ability to carry out ADLs; assess patient's baseline for ADL function and identify physical deficits which impact ability to perform ADLs (bathing, care of mouth/teeth, toileting, grooming, dressing, etc )  - Assess/evaluate cause of self-care deficits   - Assess range of motion  - Assess patient's mobility; develop plan if impaired  - Assess patient's need for assistive devices and provide as appropriate  - Encourage maximum independence but intervene and supervise when necessary  - Involve family in performance of ADLs  - Assess for home care needs following discharge   - Consider OT consult to assist with ADL evaluation and planning for discharge  - Provide patient education as appropriate  Outcome: Progressing  Goal: Maintains/Returns to pre admission functional level  Description: INTERVENTIONS:  - Perform BMAT or MOVE assessment daily    - Set and communicate daily mobility goal to care team and patient/family/caregiver  - Collaborate with rehabilitation services on mobility goals if consulted  - Perform Range of Motion 4-6 times a day  - Reposition patient every 2 hours    - Dangle patient 3-4 times a day  - Stand patient 3 times a day  - Ambulate patient 3-4 times a day  - Out of bed for toileting  - Record patient progress and toleration of activity level   Outcome: Progressing     Problem: DISCHARGE PLANNING  Goal: Discharge to home or other facility with appropriate resources  Description: INTERVENTIONS:  - Identify barriers to discharge w/patient and caregiver  - Arrange for needed discharge resources and transportation as appropriate  - Identify discharge learning needs (meds, wound care, etc )  - Arrange for interpretive services to assist at discharge as needed  - Refer to Case Management Department for coordinating discharge planning if the patient needs post-hospital services based on physician/advanced practitioner order or complex needs related to functional status, cognitive ability, or social support system  Outcome: Progressing     Problem: Knowledge Deficit  Goal: Patient/family/caregiver demonstrates understanding of disease process, treatment plan, medications, and discharge instructions  Description: Complete learning assessment and assess knowledge base    Interventions:  - Provide teaching at level of understanding  - Provide teaching via preferred learning methods  Outcome: Progressing     Problem: Potential for Falls  Goal: Patient will remain free of falls  Description: INTERVENTIONS:  - Educate patient/family on patient safety including physical limitations  - Instruct patient to call for assistance with activity   - Consult OT/PT to assist with strengthening/mobility   - Keep Call bell within reach  - Keep bed low and locked with side rails adjusted as appropriate  - Keep care items and personal belongings within reach  - Initiate and maintain comfort rounds  - Make Fall Risk Sign visible to staff  - Offer Toileting every 2 Hours, in advance of need  - Initiate/Maintain bed alarm  - Obtain necessary fall risk management equipment: walker  - Apply yellow socks and bracelet for high fall risk patients  - Consider moving patient to room near nurses station  Outcome: Progressing     Problem: MOBILITY - ADULT  Goal: Maintain or return to baseline ADL function  Description: INTERVENTIONS:  -  Assess patient's ability to carry out ADLs; assess patient's baseline for ADL function and identify physical deficits which impact ability to perform ADLs (bathing, care of mouth/teeth, toileting, grooming, dressing, etc )  - Assess/evaluate cause of self-care deficits   - Assess range of motion  - Assess patient's mobility; develop plan if impaired  - Assess patient's need for assistive devices and provide as appropriate  - Encourage maximum independence but intervene and supervise when necessary  - Involve family in performance of ADLs  - Assess for home care needs following discharge   - Consider OT consult to assist with ADL evaluation and planning for discharge  - Provide patient education as appropriate  Outcome: Progressing  Goal: Maintains/Returns to pre admission functional level  Description: INTERVENTIONS:  - Perform BMAT or MOVE assessment daily    - Set and communicate daily mobility goal to care team and patient/family/caregiver  - Collaborate with rehabilitation services on mobility goals if consulted  - Perform Range of Motion 4-6 times a day  - Reposition patient every 2 hours  - Dangle patient 3-4 times a day  - Stand patient 3 times a day  - Ambulate patient 3-4 times a day  - Out of bed for toileting  - Record patient progress and toleration of activity level   Outcome: Progressing     Problem: Prexisting or High Potential for Compromised Skin Integrity  Goal: Skin integrity is maintained or improved  Description: INTERVENTIONS:  - Identify patients at risk for skin breakdown  - Assess and monitor skin integrity  - Assess and monitor nutrition and hydration status  - Monitor labs   - Assess for incontinence   - Turn and reposition patient  - Assist with mobility/ambulation  - Relieve pressure over bony prominences  - Avoid friction and shearing  - Provide appropriate hygiene as needed including keeping skin clean and dry  - Evaluate need for skin moisturizer/barrier cream  - Collaborate with interdisciplinary team   - Patient/family teaching  - Consider wound care consult   Outcome: Progressing     Problem: Nutrition/Hydration-ADULT  Goal: Nutrient/Hydration intake appropriate for improving, restoring or maintaining nutritional needs  Description: Monitor and assess patient's nutrition/hydration status for malnutrition   Collaborate with interdisciplinary team and initiate plan and interventions as ordered  Monitor patient's weight and dietary intake as ordered or per policy  Utilize nutrition screening tool and intervene as necessary  Determine patient's food preferences and provide high-protein, high-caloric foods as appropriate  INTERVENTIONS:  - Monitor oral intake, urinary output, labs, and treatment plans  - Assess nutrition and hydration status and recommend course of action  - Evaluate amount of meals eaten  - Assist patient with eating if necessary   - Allow adequate time for meals  - Recommend/ encourage appropriate diets, oral nutritional supplements, and vitamin/mineral supplements  - Order, calculate, and assess calorie counts as needed  - Recommend, monitor, and adjust tube feedings and TPN/PPN based on assessed needs  - Assess need for intravenous fluids  - Provide specific nutrition/hydration education as appropriate  - Include patient/family/caregiver in decisions related to nutrition  Outcome: Progressing     Problem: NEUROSENSORY - ADULT  Goal: Achieves maximal functionality and self care  Description: INTERVENTIONS  - Monitor swallowing and airway patency with patient fatigue and changes in neurological status  - Encourage and assist patient to increase activity and self care     - Encourage visually impaired, hearing impaired and aphasic patients to use assistive/communication devices  Outcome: Progressing  Goal: Achieves stable or improved neurological status  Description: INTERVENTIONS  - Monitor and report changes in neurological status  - Monitor vital signs such as temperature, blood pressure, glucose, and any other labs ordered   - Initiate measures to prevent increased intracranial pressure  - Monitor for seizure activity and implement precautions if appropriate      Outcome: Progressing     Problem: CARDIOVASCULAR - ADULT  Goal: Maintains optimal cardiac output and hemodynamic stability  Description: INTERVENTIONS:  - Monitor I/O, vital signs and rhythm  - Monitor for S/S and trends of decreased cardiac output  - Administer and titrate ordered vasoactive medications to optimize hemodynamic stability  - Assess quality of pulses, skin color and temperature  - Assess for signs of decreased coronary artery perfusion  - Instruct patient to report change in severity of symptoms  Outcome: Progressing     Problem: RESPIRATORY - ADULT  Goal: Achieves optimal ventilation and oxygenation  Description: INTERVENTIONS:  - Assess for changes in respiratory status  - Assess for changes in mentation and behavior  - Position to facilitate oxygenation and minimize respiratory effort  - Oxygen administered by appropriate delivery if ordered  - Initiate smoking cessation education as indicated  - Encourage broncho-pulmonary hygiene including cough, deep breathe, Incentive Spirometry  - Assess the need for suctioning and aspirate as needed  - Assess and instruct to report SOB or any respiratory difficulty  - Respiratory Therapy support as indicated  Outcome: Progressing     Problem: GASTROINTESTINAL - ADULT  Goal: Maintains adequate nutritional intake  Description: INTERVENTIONS:  - Monitor percentage of each meal consumed  - Identify factors contributing to decreased intake, treat as appropriate  - Assist with meals as needed  - Monitor I&O, weight, and lab values if indicated  - Obtain nutrition services referral as needed  Outcome: Progressing     Problem: METABOLIC, FLUID AND ELECTROLYTES - ADULT  Goal: Electrolytes maintained within normal limits  Description: INTERVENTIONS:  - Monitor labs and assess patient for signs and symptoms of electrolyte imbalances  - Administer electrolyte replacement as ordered  - Monitor response to electrolyte replacements, including repeat lab results as appropriate  - Instruct patient on fluid and nutrition as appropriate  Outcome: Progressing  Goal: Fluid balance maintained  Description: INTERVENTIONS:  - Monitor labs   - Monitor I/O and WT  - Instruct patient on fluid and nutrition as appropriate  - Assess for signs & symptoms of volume excess or deficit  Outcome: Progressing     Problem: SKIN/TISSUE INTEGRITY - ADULT  Goal: Skin Integrity remains intact(Skin Breakdown Prevention)  Description: Assess:  -Perform Erickson assessment every shift  -Clean and moisturize skin every day  -Inspect skin when repositioning, toileting, and assisting with ADLS  -Assess extremities for adequate circulation and sensation     Bed Management:  -Have minimal linens on bed & keep smooth, unwrinkled  -Change linens as needed when moist or perspiring  -Avoid sitting or lying in one position for more than 2 hours while in bed  -Keep HOB at 45 degrees     Toileting:  -Offer bedside commode  -Assess for incontinence every 2 hours  -Use incontinent care products after each incontinent episode such as brief    Activity:  -Mobilize patient 3-4 times a day  -Encourage activity and walks on unit  -Encourage or provide ROM exercises   -Turn and reposition patient every 2 Hours  -Use appropriate equipment to lift or move patient in bed  -Instruct/ Assist with weight shifting every 2 when out of bed in chair  -Consider limitation of chair time 2 hour intervals    Skin Care:  -Avoid use of baby powder, tape, friction and shearing, hot water or constrictive clothing  -Relieve pressure over bony prominences using pillows  -Do not massage red bony areas    Next Steps:  -Teach patient strategies to minimize risks such as walker  -Consider consults to  interdisciplinary teams such as wound  Outcome: Progressing  Goal: Incision(s), wounds(s) or drain site(s) healing without S/S of infection  Description: INTERVENTIONS  - Assess and document dressing, incision, wound bed, drain sites and surrounding tissue  - Provide patient and family education  - Perform skin care/dressing changes every PRN  Outcome: Progressing     Problem: MUSCULOSKELETAL - ADULT  Goal: Maintain or return mobility to safest level of function  Description: INTERVENTIONS:  - Assess patient's ability to carry out ADLs; assess patient's baseline for ADL function and identify physical deficits which impact ability to perform ADLs (bathing, care of mouth/teeth, toileting, grooming, dressing, etc )  - Assess/evaluate cause of self-care deficits   - Assess range of motion  - Assess patient's mobility  - Assess patient's need for assistive devices and provide as appropriate  - Encourage maximum independence but intervene and supervise when necessary  - Involve family in performance of ADLs  - Assess for home care needs following discharge   - Consider OT consult to assist with ADL evaluation and planning for discharge  - Provide patient education as appropriate  Outcome: Progressing

## 2022-11-01 NOTE — PHYSICAL THERAPY NOTE
PT PROGRESS NOTE    Name: Devin Zhang  AGE: 45 y o  MRN: [id number]  LENGTH OF STAY: 37    Admitting Dx:  Hyponatremia [E87 1]  Altered mental status [R41 82]  ARF (acute renal failure) (HCC) [N17 9]  Hypoglycemia [E16 2]  Transaminitis [R74 01]  Elevated liver enzymes [R74 8]  Positive D dimer [R79 89]  ANUJA (acute kidney injury) ([address] ) [N17 9]  Swelling of right foot [M79 89]      Patient Active Problem List   Diagnosis    Irritable bowel syndrome with diarrhea    Hypertension    Hyperlipidemia    Depression    COPD (chronic obstructive pulmonary disease) ([facility])    Generalized anxiety disorder    Right knee pain    History of rib fracture    Tobacco abuse    Chondromalacia patellae    DDD (degenerative disc disease), lumbosacral    Insomnia    Hematochezia    Intercostal neuralgia    Ischemic colitis ([facility] )    Internal hemorrhoids    Adnexal cyst    Chronic right shoulder pain    Opioid dependence (HCC)    Hyperglycemia    ARF (acute renal failure) ([facility])    Transaminitis    Toxic metabolic encephalopathy    Abnormal CT of the chest    Traumatic rhabdomyolysis ([facility])    D-dimer, elevated    Leg edema, right    Localized swelling of right upper extremity    Bacteremia due to methicillin susceptible Staphylococcus aureus (MSSA)    Pulmonary edema    Aspiration pneumonitis (HCC)    Hypokalemia    Hypernatremia    Diarrhea    Mild protein-calorie malnutrition ([facility])    Moderate protein-calorie malnutrition ([facility] [address] )               11/01/22 1053   PT Last Visit   PT Visit Date 11/01/22   Note Type   Note Type Treatment   Pain Assessment   Pain Score 10 - Worst Possible Pain   Pain Location/Orientation Orientation: Right;[place] Foot   Restrictions/Precautions   Weight Bearing Precautions Per Order No   Other Precautions 1:1;Cognitive; Chair Alarm; Bed Alarm; Fall Risk;Pain  (R foot drop)   General   Chart Reviewed Yes   Response to Previous Treatment Patient unable to report, no changes reported from family or staff Family/Caregiver Present No   Cognition   Overall Cognitive Status Impaired   Arousal/Participation Alert   Attention Attends with cues to redirect   Orientation Level Oriented to person;Oriented to place; Disoriented to time;Disoriented to situation   Following Commands Follows one step commands with increased time or repetition   Comments Pt pleasant & cooperative   Subjective   Subjective Pt agreeable to PT/OT tx  Self Selected Walking Speed   SSWS Trial 1 (Seconds) 13 97 Seconds   SSWS Trial 2 (Seconds) 13 82 Seconds   SSWS Trial 3 (Seconds) 13 8 Seconds   SSWS Average Time (Seconds) 13 9 seconds   SSWS Average Score (m/sec) 0 4 m/sec   Bed Mobility   Supine to Sit 4  Minimal assistance   Additional items Assist x 1;HOB elevated; Bedrails; Increased time required;Verbal cues;LE management   Additional Comments cues for techniques & safety   Transfers   Sit to Stand 4  Minimal assistance   Additional items Assist x 1; Increased time required;Verbal cues   Stand to Sit 4  Minimal assistance   Additional items Assist x 1; Increased time required;Verbal cues   Additional Comments cues for hand placement   Ambulation/Elevation   Gait pattern Decreased foot clearance;Scissoring; Steppage; Excessively slow;Decreased heel strike  (R foot drop)   Gait Assistance 4  Minimal assist   Additional items Assist x 1;Verbal cues; Tactile cues   Assistive Device Rolling walker   Distance 250'x1 w/ RW; 60'x1 w/ RW & R DF assist wrap (ace bandage)   Ambulation/Elevation Additional Comments unsteady, scissoring R>L, R steppage gait & tends to veer to the L; no signficant improvement noted w/ steppage gait w/ use of DF assist wrap; will recommend MAFO for R foot drop   Balance   Static Sitting Good   Dynamic Sitting Fair +   Static Standing Fair -   Dynamic Standing Poor +   Ambulatory Poor +   Activity Tolerance   Activity Tolerance Patient tolerated treatment well   Medical Staff Made Aware [address]   Nurse Made Aware yes   Exercises Hip Flexion Sitting;15 reps;AROM; Bilateral   Hip Abduction Sitting;15 reps;AROM; Bilateral   Hip Adduction Sitting;15 reps;AROM; Bilateral   Knee AROM Long Arc Quad Sitting;15 reps;AROM; Bilateral   Ankle Pumps Sitting;15 reps;AROM; Right;Left;AAROM  (AAROM RLE; AROM LLE)   Assessment   Prognosis Fair   Problem List Decreased strength; Impaired balance;Decreased mobility; Decreased cognition; Impaired judgement;Decreased safety awareness;Pain   Assessment Pt seen for PT per POC  Improved mobility & activity tolerance noted this tx session  PT goals expiring today, pt progressing towards goals  Currently, pt require minAx1 for most functional mobility including amb w/ RW + cues for techniques & safety  Gait deviations as above, slow, unsteady, scissoring R>L & R steppage gait but no gross LOB noted  Pt w/ R foot drop, trialed DF assist wrap during amb but no significant improvement w/ steppage gait noted  Also noted that pt tend to veer to the L w/ RW, require cues to have inc control w/ RW mgt  Will recommend R MAFO, will trial next tx session  Self Selected Walking Speed Test Score 0 4 m/sec, indicating that pt is currently a household ambulator  Pt tolerated above mentioned thera  ex well  Pt require cues for proper exercise techniques and performance  Please see flowsheet above for objective findings & levels of assistance required for all functional tasks during this tx session  Nsg staff most recent vital signs as follows: /86   Pulse 92   Temp 98 2 °F (36 8 °C)   Resp 15   Ht 5' 6" (1 676 m)   Wt 51 7 kg (113 lb 15 7 oz)   LMP 03/14/2019 (Approximate)   SpO2 97%   BMI 18 40 kg/m²   Will continue PT per POC  At end of session, pt OOB in chair in stable condition, call bell & phone in reach, chair alarm activated  Fall precautions reinforced w/ good understanding  The patient's AM-PAC Basic Mobility Inpatient Short Form Raw Score is 18   A Raw score of greater than 16 suggests the patient may benefit from discharge to home  Please also refer to the recommendation of the Physical Therapist for safe discharge planning  From PT standpoint, despite AM-PAC score, D/C rec: STR or HHPT w/ 24/7 at D/C, pending stair training & 24/7 supervision availability at home  Pt may return home w/ HHPT if 24/7 supervision available  CM to follow  Nsg staff to continue to mobilized pt (OOB in chair for all meals & ambulate in room/unit) as tolerated to prevent decline in function  Nsg notified  Goals   Patient Goals none stated   STG Expiration Date 11/15/22   Short Term Goal #1 Goals to be met in 14 days; pt will be able to: 1) inc strength & balance by 1/2 grade to improve overall functional mobility & dec fall risk; 2) inc bed mobility to modified I for pt to be able to get in/OOB safely w/ proper techniques 100% of the time, to dec caregiver burden & safely function at home; 3) inc transfers to S for pt to transition safely from one surface to another w/o % of the time, to dec caregiver burden & safely function at home; 4) inc amb w/ appropriate AD approx  >350' w/ S for pt to ambulate community distances w/o any % of the time, to dec caregiver burden & safely function at home; 5) negotiate stairs w/ S for inc safety during stair mgt inside/outside of home & dec caregiver burden; 6) tolerate MAFO 100% of the time; 7) pt/caregiver ed   PT Treatment Day 15   Plan   Treatment/Interventions Functional transfer training;LE strengthening/ROM; Elevations; Therapeutic exercise; Endurance training;Patient/family training;Bed mobility;Gait training;Spoke to nursing;Spoke to case management;OT   Progress Progressing toward goals   PT Frequency 2-3x/wk   Recommendation   PT Discharge Recommendation Post acute rehabilitation services  (STR vs HHPT pending 24/7 supervision at home; pt may return home w/ HHPT if 24/7 supervision available)   Equipment Recommended Nicole Rae; Other (Comment)  (MAFO)   Walker Package Recommended Wheeled walker   AM-PAC Basic Mobility Inpatient   Turning in Bed Without Bedrails 3   Lying on Back to Sitting on Edge of Flat Bed 3   Moving Bed to Chair 3   Standing Up From Chair 3   Walk in Room 3   Climb 3-5 Stairs 3   Basic Mobility Inpatient Raw Score 18   Basic Mobility Standardized Score 41 05   Highest Level Of Mobility   JH-HLM Goal 6: Walk 10 steps or more   JH-HLM Achieved 8: Walk 250 feet ot more   Education   Education Provided Mobility training;Home exercise program;Assistive device   Patient Reinforcement needed   End of Consult   Patient Position at End of Consult Bedside chair;Bed/Chair alarm activated; All needs within reach   End of Consult Comments Pt in stable condition  All needs in reach  Chair alarm activated   1:1 in room   Gary Carbon

## 2022-11-01 NOTE — PLAN OF CARE
Problem: PHYSICAL THERAPY ADULT  Goal: Performs mobility at highest level of function for planned discharge setting  See evaluation for individualized goals  Description: Treatment/Interventions: Functional transfer training, LE strengthening/ROM, Elevations, Therapeutic exercise, Cognitive reorientation, Patient/family training, Equipment eval/education, Bed mobility, Gait training, Compensatory technique education, Continued evaluation, Spoke to nursing, Spoke to case management, Spoke to MD, OT, ST  Equipment Recommended: Marla Henriquez (if patient does not own)       See flowsheet documentation for full assessment, interventions and recommendations  Outcome: Progressing  Note: Prognosis: Fair  Problem List: Decreased strength, Impaired balance, Decreased mobility, Decreased cognition, Impaired judgement, Decreased safety awareness, Pain  Assessment: Pt seen for PT per POC  Improved mobility & activity tolerance noted this tx session  PT goals expiring today, pt progressing towards goals  Currently, pt require minAx1 for most functional mobility including amb w/ RW + cues for techniques & safety  Gait deviations as above, slow, unsteady, scissoring R>L & R steppage gait but no gross LOB noted  Pt w/ R foot drop, trialed DF assist wrap during amb but no significant improvement w/ steppage gait noted  Also noted that pt tend to veer to the L w/ RW, require cues to have inc control w/ RW mgt  Will recommend R MAFO, will trial next tx session  Self Selected Walking Speed Test Score 0 4 m/sec, indicating that pt is currently a household ambulator  Pt tolerated above mentioned thera  ex well  Pt require cues for proper exercise techniques and performance  Please see flowsheet above for objective findings & levels of assistance required for all functional tasks during this tx session   Nsg staff most recent vital signs as follows: /86   Pulse 92   Temp 98 2 °F (36 8 °C)   Resp 15   Ht 5' 6" (1 676 m)   Wt 51 7 kg (113 lb 15 7 oz)   LMP 03/14/2019 (Approximate)   SpO2 97%   BMI 18 40 kg/m²   Will continue PT per POC  At end of session, pt OOB in chair in stable condition, call bell & phone in reach, chair alarm activated  Fall precautions reinforced w/ good understanding  The patient's AM-PAC Basic Mobility Inpatient Short Form Raw Score is 18  A Raw score of greater than 16 suggests the patient may benefit from discharge to home  Please also refer to the recommendation of the Physical Therapist for safe discharge planning  From PT standpoint, despite AM-PAC score, D/C rec: STR or HHPT w/ 24/7 at D/C, pending stair training & 24/7 supervision availability at home  Pt may return home w/ HHPT if 24/7 supervision available  CM to follow  Nsg staff to continue to mobilized pt (OOB in chair for all meals & ambulate in room/unit) as tolerated to prevent decline in function  Nsg notified  Barriers to Discharge: Decreased caregiver support, Inaccessible home environment  Barriers to Discharge Comments: unable to identify at this time  PT Discharge Recommendation: Post acute rehabilitation services (STR vs HHPT pending 24/7 supervision at home; pt may return home w/ HHPT if 24/7 supervision available)    See flowsheet documentation for full assessment

## 2022-11-01 NOTE — PROGRESS NOTES
[facility]  Progress Note - Demetrius Grace 1970, 45 y o  female MRN: [id number]  Unit/Bed#: [facility] -01 Encounter: [id number]  Primary Care Provider: Shanika Fisher MD   Date and time admitted to hospital: 9/19/2022  8:04 AM    * Toxic metabolic encephalopathy  Assessment & Plan  · Felt to be multifactorial due to acute kidney injury, on top morphine and gabapentin use, infection  · Interestingly her MRI was noted to have severe periventricular and white matter hyperintensities which will need 3 month follow-up  · Overall improving but still noted to be impulsive and is a high fall risk   · She continues to require one-to-one observation  · Awaiting inpatient rehab placement    Ambulatory dysfunction  Assessment & Plan  · Physical therapy evaluation today  · Working on getting her home with 247 supervision  · Discussed with   · He is agreeable with this plan  · He plans to be with her and then she will either have home therapy or outpatient    Tobacco abuse  Assessment & Plan  · Nicotine patch ordered    Depression  Assessment & Plan  Stable  Continue Effexor 187 5 mg daily and Seroquel 50 mg b i d       VTE Pharmacologic Prophylaxis:   Pharmacologic: Heparin  Mechanical VTE Prophylaxis in Place: Yes    Patient Centered Rounds: I have performed bedside rounds with nursing staff today  Discussions with Specialists or Other Care Team Provider:  Case management    Education and Discussions with Family / Patient:  Spoke with     Time Spent for Care: 20 minutes  More than 50% of total time spent on counseling and coordination of care as described above      Current Length of Stay: 43 day(s)    Current Patient Status: Inpatient     Discharge Plan:  Possible DC Thursday once everything is set up    Code Status: Level 1 - Full Code      Subjective:   Seen and examined earlier today  No complaints  Still on one-to-one due to impulsivity and fall risk    Objective: Vitals:   Temp (24hrs), Av 5 °F (36 9 °C), Min:97 8 °F (36 6 °C), Max:99 4 °F (37 4 °C)    Temp:  [97 8 °F (36 6 °C)-99 4 °F (37 4 °C)] 97 8 °F (36 6 °C)  HR:  [] 101  Resp:  [15-20] 15  BP: (115-118)/(71-86) 116/76  SpO2:  [96 %-97 %] 97 %  Body mass index is 18 4 kg/m²  Input and Output Summary (last 24 hours):     No intake or output data in the 24 hours ending 22 1627    Physical Exam:     Physical Exam  Constitutional:       Appearance: She is not ill-appearing or diaphoretic  HENT:      Head: Normocephalic and atraumatic  Nose: No congestion or rhinorrhea  Eyes:      General: No scleral icterus  Cardiovascular:      Rate and Rhythm: Normal rate and regular rhythm  Heart sounds: No murmur heard  Pulmonary:      Effort: Pulmonary effort is normal  No respiratory distress  Breath sounds: No wheezing or rales  Abdominal:      General: Abdomen is flat  Palpations: Abdomen is soft  Musculoskeletal:      Cervical back: Neck supple  Right lower leg: No edema  Left lower leg: No edema  Skin:     General: Skin is warm and dry  Neurological:      Mental Status: She is alert  Comments: Oriented to place, date  She missed the day of the week  She was not agitated or hallucinating  She was calm and cooperative   Psychiatric:         Behavior: Behavior normal          Additional Data:     Labs:    Results from last 7 days   Lab Units 10/26/22  1049   WBC Thousand/uL 6 37   HEMOGLOBIN g/dL 10 5*   HEMATOCRIT % 33 8*   PLATELETS Thousands/uL 229     Results from last 7 days   Lab Units 10/26/22  1049   SODIUM mmol/L 140   POTASSIUM mmol/L 3 5   CHLORIDE mmol/L 104   CO2 mmol/L 25   BUN mg/dL 12   CREATININE mg/dL 0 75   ANION GAP mmol/L 11   CALCIUM mg/dL 9 1   GLUCOSE RANDOM mg/dL 138                           * I Have Reviewed All Lab Data Listed Above  * Additional Pertinent Lab Tests Reviewed:  All Labs Within Last 24 Hours Reviewed    Imaging:    Imaging Reports Reviewed Today Include:  None      Recent Cultures (last 7 days):           Last 24 Hours Medication List:   Current Facility-Administered Medications   Medication Dose Route Frequency Provider Last Rate   • acetaminophen  650 mg Oral Q6H PRN Kusum Graham, DO     • ALPRAZolam  0 5 mg Oral HS PRN Jordyn Cardoso MD     • amLODIPine  10 mg Oral Daily Trice Diaz MD     • gabapentin  300 mg Oral 4x Daily Brett White, DO     • heparin (porcine)  5,000 Units Subcutaneous Q8H [address] Atul Adan, DO     • levalbuterol  1 25 mg Nebulization Q4H PRN Cynthia Granados, DO     • loperamide  2 mg Oral 4x Daily PRN Jordyn Cardoso MD     • metoprolol tartrate  25 mg Oral Q12H [address] Inder Francois MD     • VANDANA ANTIFUNGAL   Topical BID Jarome [place] Maksim Dick, DO     • nicotine  1 patch Transdermal Daily Atul Adan, DO     • OLANZapine  5 mg Oral Q6H PRN Cynthia Granados DO     • ondansetron  4 mg Intravenous Q4H PRN Kusum Graham, DO     • psyllium  1 packet Oral Daily Brett White, DO     • QUEtiapine  50 mg Oral BID Jordyn Cardoso MD     • saccharomyces boulardii  250 mg Oral BID Jordyn Cardoso MD     • traMADol  50 mg Oral BID PRN CARLITOS Sanchez     • venlafaxine  187 5 mg Oral Daily Gerson Parekh DO          Today, Patient Was Seen By: Susie Marsh    ** Please Note: Dictation voice to text software may have been used in the creation of this document   **

## 2022-11-01 NOTE — CASE MANAGEMENT
Case Management Discharge Planning Note    Patient name [facility]  Location [facility] /[facility] Devi Moore* MRN [id number]  : 1970 Date 2022       Current Admission Date: 2022  Current Admission Diagnosis:Toxic metabolic encephalopathy   Patient Active Problem List    Diagnosis Date Noted   • Moderate protein-calorie malnutrition ([address] ) 10/25/2022   • Mild protein-calorie malnutrition ([address] ) 10/09/2022   • Diarrhea 10/03/2022   • Hypokalemia 10/02/2022   • Hypernatremia 10/02/2022   • Aspiration pneumonitis ([address] ) 10/01/2022   • Pulmonary edema 2022   • Bacteremia due to methicillin susceptible Staphylococcus aureus (MSSA) 2022   • Localized swelling of right upper extremity 2022   • Leg edema, right 2022   • D-dimer, elevated 2022   • ARF (acute renal failure) ([address] ) 2022   • Transaminitis 2022   • Toxic metabolic encephalopathy    • Abnormal CT of the chest 2022   • Traumatic rhabdomyolysis ([address] ) 2022   • Hyperglycemia 2022   • Opioid dependence ([address] ) 2021   • Chronic right shoulder pain 2021   • Internal hemorrhoids 2020   • Adnexal cyst 2020   • Ischemic colitis ([address] ) 2020   • Intercostal neuralgia    • Hematochezia 2019   • Insomnia 2019   • History of rib fracture 10/28/2018   • Tobacco abuse 10/28/2018   • Right knee pain 2018   • Generalized anxiety disorder 2018   • Hypertension    • Hyperlipidemia    • Depression    • COPD (chronic obstructive pulmonary disease) (HCC)    • Chondromalacia patellae 2018   • Irritable bowel syndrome with diarrhea 2018   • DDD (degenerative disc disease), lumbosacral 2014      LOS (days): 43  Geometric Mean LOS (GMLOS) (days):   Days to GMLOS:     OBJECTIVE:  Risk of Unplanned Readmission Score: 28 21         Current admission status: Inpatient   Preferred Pharmacy:   [facility] [address]  [id number]  Phone: [phone] Fax: [phone], [address] [facility]  [address] [facility]  [place] [address]  Phone: [phone] Fax: [phone]    Primary Care Provider: Tete Byrne MD    Primary Insurance: BLUE CROSS  Secondary Insurance:     DISCHARGE DETAILS:                           Contacts  Patient Contacts: Bharat Pollard  Relationship to Patient[de-identified] Family  Contact Method: Phone  Phone Number: [phone]  Reason/Outcome: Discharge Planning                                                                     Additional Comments: CM spoke with pt's  re: discharge planning and potential avenues as functionally is doing likely too good at this point for rehab   is to talk with remaining family to see if able to help provide 24/7 supervision- discussed possibilities of pt staying on 2nd floor vs 1st floor set up (would need a hospital bed and BSC if this was the case)  Pt has 2-3 steps a landing then additional 11 steps to the 2nd floor (asked PT to work with pt on steps in a m to know level of assistance needed for same)  Further discussion had re: possibility of [address] [place] [address] rehab in the home vs going to outpatient [address]hab [address] investigated both with [address] [place] [address] able to provide ST/OT/PT in the home (depending on location and availability of ST could be delayed slightly- liaison to check same as well as if pt's insurance is par with their agency)    [facility] an outpatient neuro rehab available (depending on diagnosis/patient needs/availability could be lumped together in one treatment session and typically is 2-3x/wk)- OP Referral to PT/OT/ST for neuro rehab with specific diagnosis should be listed on script and can be provided by the PCP if pt/ feel the in home therapy is no longer beneficial and would like to change to the neuro rehab at [address] [place] [address] Road would be beneficial to pt to start with home therapy so to get acclimated to surroundings prior to going to OP rehab=-agreed upon by /CM  Other suggestions for home safety discussed with pt's  e g  door sensor mat, bed or chair alarm (could possibly assist with fall prevention at home)  Pt's  requested to bring in sneaker type shoes for pt so to utilize a MAFO brace with therapy in a m   SW to continue to follow to assist with a safe dc poc

## 2022-11-01 NOTE — PLAN OF CARE
Problem: PAIN - ADULT  Goal: Verbalizes/displays adequate comfort level or baseline comfort level  Description: Interventions:  - Encourage patient to monitor pain and request assistance  - Assess pain using appropriate pain scale  - Administer analgesics based on type and severity of pain and evaluate response  - Implement non-pharmacological measures as appropriate and evaluate response  - Consider cultural and social influences on pain and pain management  - Notify physician/advanced practitioner if interventions unsuccessful or patient reports new pain  Outcome: Progressing     Problem: INFECTION - ADULT  Goal: Absence or prevention of progression during hospitalization  Description: INTERVENTIONS:  - Assess and monitor for signs and symptoms of infection  - Monitor lab/diagnostic results  - Monitor all insertion sites, i e  indwelling lines, tubes, and drains  - Monitor endotracheal if appropriate and nasal secretions for changes in amount and color  - [place] appropriate cooling/warming therapies per order  - Administer medications as ordered  - Instruct and encourage patient and family to use good hand hygiene technique  - Identify and instruct in appropriate isolation precautions for identified infection/condition  Outcome: Progressing     Problem: SAFETY ADULT  Goal: Patient will remain free of falls  Description: INTERVENTIONS:  - Educate patient/family on patient safety including physical limitations  - Instruct patient to call for assistance with activity   - Consult OT/PT to assist with strengthening/mobility   - Keep Call bell within reach  - Keep bed low and locked with side rails adjusted as appropriate  - Keep care items and personal belongings within reach  - Initiate and maintain comfort rounds  - Make Fall Risk Sign visible to staff  - Offer Toileting every 2 Hours, in advance of need  - Initiate/Maintain bed alarm  - Obtain necessary fall risk management equipment: walker  - Apply yellow socks and bracelet for high fall risk patients  - Consider moving patient to room near nurses station  Outcome: Progressing  Goal: Maintain or return to baseline ADL function  Description: INTERVENTIONS:  -  Assess patient's ability to carry out ADLs; assess patient's baseline for ADL function and identify physical deficits which impact ability to perform ADLs (bathing, care of mouth/teeth, toileting, grooming, dressing, etc )  - Assess/evaluate cause of self-care deficits   - Assess range of motion  - Assess patient's mobility; develop plan if impaired  - Assess patient's need for assistive devices and provide as appropriate  - Encourage maximum independence but intervene and supervise when necessary  - Involve family in performance of ADLs  - Assess for home care needs following discharge   - Consider OT consult to assist with ADL evaluation and planning for discharge  - Provide patient education as appropriate  Outcome: Progressing  Goal: Maintains/Returns to pre admission functional level  Description: INTERVENTIONS:  - Perform BMAT or MOVE assessment daily    - Set and communicate daily mobility goal to care team and patient/family/caregiver  - Collaborate with rehabilitation services on mobility goals if consulted  - Perform Range of Motion 4-6 times a day  - Reposition patient every 2 hours    - Dangle patient 3-4 times a day  - Stand patient 3 times a day  - Ambulate patient 3-4 times a day  - Out of bed for toileting  - Record patient progress and toleration of activity level   Outcome: Progressing     Problem: DISCHARGE PLANNING  Goal: Discharge to home or other facility with appropriate resources  Description: INTERVENTIONS:  - Identify barriers to discharge w/patient and caregiver  - Arrange for needed discharge resources and transportation as appropriate  - Identify discharge learning needs (meds, wound care, etc )  - Arrange for interpretive services to assist at discharge as needed  - Refer to Case Management Department for coordinating discharge planning if the patient needs post-hospital services based on physician/advanced practitioner order or complex needs related to functional status, cognitive ability, or social support system  Outcome: Progressing     Problem: Knowledge Deficit  Goal: Patient/family/caregiver demonstrates understanding of disease process, treatment plan, medications, and discharge instructions  Description: Complete learning assessment and assess knowledge base    Interventions:  - Provide teaching at level of understanding  - Provide teaching via preferred learning methods  Outcome: Progressing     Problem: Potential for Falls  Goal: Patient will remain free of falls  Description: INTERVENTIONS:  - Educate patient/family on patient safety including physical limitations  - Instruct patient to call for assistance with activity   - Consult OT/PT to assist with strengthening/mobility   - Keep Call bell within reach  - Keep bed low and locked with side rails adjusted as appropriate  - Keep care items and personal belongings within reach  - Initiate and maintain comfort rounds  - Make Fall Risk Sign visible to staff  - Offer Toileting every 2 Hours, in advance of need  - Initiate/Maintain bed alarm  - Obtain necessary fall risk management equipment: walker  - Apply yellow socks and bracelet for high fall risk patients  - Consider moving patient to room near nurses station  Outcome: Progressing     Problem: MOBILITY - ADULT  Goal: Maintain or return to baseline ADL function  Description: INTERVENTIONS:  -  Assess patient's ability to carry out ADLs; assess patient's baseline for ADL function and identify physical deficits which impact ability to perform ADLs (bathing, care of mouth/teeth, toileting, grooming, dressing, etc )  - Assess/evaluate cause of self-care deficits   - Assess range of motion  - Assess patient's mobility; develop plan if impaired  - Assess patient's need for assistive devices and provide as appropriate  - Encourage maximum independence but intervene and supervise when necessary  - Involve family in performance of ADLs  - Assess for home care needs following discharge   - Consider OT consult to assist with ADL evaluation and planning for discharge  - Provide patient education as appropriate  Outcome: Progressing  Goal: Maintains/Returns to pre admission functional level  Description: INTERVENTIONS:  - Perform BMAT or MOVE assessment daily    - Set and communicate daily mobility goal to care team and patient/family/caregiver  - Collaborate with rehabilitation services on mobility goals if consulted  - Perform Range of Motion 4-6 times a day  - Reposition patient every 2 hours  - Dangle patient 3-4 times a day  - Stand patient 3 times a day  - Ambulate patient 3-4 times a day  - Out of bed for toileting  - Record patient progress and toleration of activity level   Outcome: Progressing     Problem: Prexisting or High Potential for Compromised Skin Integrity  Goal: Skin integrity is maintained or improved  Description: INTERVENTIONS:  - Identify patients at risk for skin breakdown  - Assess and monitor skin integrity  - Assess and monitor nutrition and hydration status  - Monitor labs   - Assess for incontinence   - Turn and reposition patient  - Assist with mobility/ambulation  - Relieve pressure over bony prominences  - Avoid friction and shearing  - Provide appropriate hygiene as needed including keeping skin clean and dry  - Evaluate need for skin moisturizer/barrier cream  - Collaborate with interdisciplinary team   - Patient/family teaching  - Consider wound care consult   Outcome: Progressing     Problem: Nutrition/Hydration-ADULT  Goal: Nutrient/Hydration intake appropriate for improving, restoring or maintaining nutritional needs  Description: Monitor and assess patient's nutrition/hydration status for malnutrition   Collaborate with interdisciplinary team and initiate plan and interventions as ordered  Monitor patient's weight and dietary intake as ordered or per policy  Utilize nutrition screening tool and intervene as necessary  Determine patient's food preferences and provide high-protein, high-caloric foods as appropriate  INTERVENTIONS:  - Monitor oral intake, urinary output, labs, and treatment plans  - Assess nutrition and hydration status and recommend course of action  - Evaluate amount of meals eaten  - Assist patient with eating if necessary   - Allow adequate time for meals  - Recommend/ encourage appropriate diets, oral nutritional supplements, and vitamin/mineral supplements  - Order, calculate, and assess calorie counts as needed  - Recommend, monitor, and adjust tube feedings and TPN/PPN based on assessed needs  - Assess need for intravenous fluids  - Provide specific nutrition/hydration education as appropriate  - Include patient/family/caregiver in decisions related to nutrition  Outcome: Progressing     Problem: NEUROSENSORY - ADULT  Goal: Achieves maximal functionality and self care  Description: INTERVENTIONS  - Monitor swallowing and airway patency with patient fatigue and changes in neurological status  - Encourage and assist patient to increase activity and self care     - Encourage visually impaired, hearing impaired and aphasic patients to use assistive/communication devices  Outcome: Progressing  Goal: Achieves stable or improved neurological status  Description: INTERVENTIONS  - Monitor and report changes in neurological status  - Monitor vital signs such as temperature, blood pressure, glucose, and any other labs ordered   - Initiate measures to prevent increased intracranial pressure  - Monitor for seizure activity and implement precautions if appropriate      Outcome: Progressing     Problem: CARDIOVASCULAR - ADULT  Goal: Maintains optimal cardiac output and hemodynamic stability  Description: INTERVENTIONS:  - Monitor I/O, vital signs and rhythm  - Monitor for S/S and trends of decreased cardiac output  - Administer and titrate ordered vasoactive medications to optimize hemodynamic stability  - Assess quality of pulses, skin color and temperature  - Assess for signs of decreased coronary artery perfusion  - Instruct patient to report change in severity of symptoms  Outcome: Progressing     Problem: RESPIRATORY - ADULT  Goal: Achieves optimal ventilation and oxygenation  Description: INTERVENTIONS:  - Assess for changes in respiratory status  - Assess for changes in mentation and behavior  - Position to facilitate oxygenation and minimize respiratory effort  - Oxygen administered by appropriate delivery if ordered  - Initiate smoking cessation education as indicated  - Encourage broncho-pulmonary hygiene including cough, deep breathe, Incentive Spirometry  - Assess the need for suctioning and aspirate as needed  - Assess and instruct to report SOB or any respiratory difficulty  - Respiratory Therapy support as indicated  Outcome: Progressing     Problem: GASTROINTESTINAL - ADULT  Goal: Maintains adequate nutritional intake  Description: INTERVENTIONS:  - Monitor percentage of each meal consumed  - Identify factors contributing to decreased intake, treat as appropriate  - Assist with meals as needed  - Monitor I&O, weight, and lab values if indicated  - Obtain nutrition services referral as needed  Outcome: Progressing     Problem: METABOLIC, FLUID AND ELECTROLYTES - ADULT  Goal: Electrolytes maintained within normal limits  Description: INTERVENTIONS:  - Monitor labs and assess patient for signs and symptoms of electrolyte imbalances  - Administer electrolyte replacement as ordered  - Monitor response to electrolyte replacements, including repeat lab results as appropriate  - Instruct patient on fluid and nutrition as appropriate  Outcome: Progressing  Goal: Fluid balance maintained  Description: INTERVENTIONS:  - Monitor labs   - Monitor I/O and WT  - Instruct patient on fluid and nutrition as appropriate  - Assess for signs & symptoms of volume excess or deficit  Outcome: Progressing     Problem: SKIN/TISSUE INTEGRITY - ADULT  Goal: Skin Integrity remains intact(Skin Breakdown Prevention)  Description: Assess:  -Perform [place] assessment every shift  -Clean and moisturize skin every day  -Inspect skin when repositioning, toileting, and assisting with ADLS  -Assess extremities for adequate circulation and sensation     Bed Management:  -Have minimal linens on bed & keep smooth, unwrinkled  -Change linens as needed when moist or perspiring  -Avoid sitting or lying in one position for more than 2 hours while in bed  -Keep HOB at 45 degrees     Toileting:  -Offer bedside commode  -Assess for incontinence every 2 hours  -Use incontinent care products after each incontinent episode such as brief    Activity:  -Mobilize patient 3-4 times a day  -Encourage activity and walks on unit  -Encourage or provide ROM exercises   -Turn and reposition patient every 2 Hours  -Use appropriate equipment to lift or move patient in bed  -Instruct/ Assist with weight shifting every 2 when out of bed in chair  -Consider limitation of chair time 2 hour intervals    Skin Care:  -Avoid use of baby powder, tape, friction and shearing, hot water or constrictive clothing  -Relieve pressure over bony prominences using pillows  -Do not massage red bony areas    Next Steps:  -Teach patient strategies to minimize risks such as walker  -Consider consults to  interdisciplinary teams such as wound  Outcome: Progressing  Goal: Incision(s), wounds(s) or drain site(s) healing without S/S of infection  Description: INTERVENTIONS  - Assess and document dressing, incision, wound bed, drain sites and surrounding tissue  - Provide patient and family education  - Perform skin care/dressing changes every PRN  Outcome: Progressing     Problem: MUSCULOSKELETAL - ADULT  Goal: Maintain or return mobility to safest level of function  Description: INTERVENTIONS:  - Assess patient's ability to carry out ADLs; assess patient's baseline for ADL function and identify physical deficits which impact ability to perform ADLs (bathing, care of mouth/teeth, toileting, grooming, dressing, etc )  - Assess/evaluate cause of self-care deficits   - Assess range of motion  - Assess patient's mobility  - Assess patient's need for assistive devices and provide as appropriate  - Encourage maximum independence but intervene and supervise when necessary  - Involve family in performance of ADLs  - Assess for home care needs following discharge   - Consider OT consult to assist with ADL evaluation and planning for discharge  - Provide patient education as appropriate  Outcome: Progressing

## 2022-11-01 NOTE — ASSESSMENT & PLAN NOTE
· Physical therapy evaluation today  · Working on getting her home with 247 supervision  · Discussed with   · He is agreeable with this plan  · He plans to be with her and then she will either have home therapy or outpatient

## 2022-11-02 RX ADMIN — QUETIAPINE FUMARATE 50 MG: 25 TABLET ORAL at 08:58

## 2022-11-02 RX ADMIN — GABAPENTIN 300 MG: 300 CAPSULE ORAL at 08:58

## 2022-11-02 RX ADMIN — AMLODIPINE BESYLATE 10 MG: 10 TABLET ORAL at 08:58

## 2022-11-02 RX ADMIN — METOPROLOL TARTRATE 25 MG: 25 TABLET, FILM COATED ORAL at 21:03

## 2022-11-02 RX ADMIN — Medication 250 MG: at 17:48

## 2022-11-02 RX ADMIN — GABAPENTIN 300 MG: 300 CAPSULE ORAL at 12:49

## 2022-11-02 RX ADMIN — GABAPENTIN 300 MG: 300 CAPSULE ORAL at 17:48

## 2022-11-02 RX ADMIN — Medication 1 PATCH: at 09:00

## 2022-11-02 RX ADMIN — ACETAMINOPHEN 650 MG: 325 TABLET, FILM COATED ORAL at 04:27

## 2022-11-02 RX ADMIN — MICONAZOLE NITRATE: 20 CREAM TOPICAL at 17:48

## 2022-11-02 RX ADMIN — Medication 250 MG: at 08:58

## 2022-11-02 RX ADMIN — HEPARIN SODIUM 5000 UNITS: 5000 INJECTION INTRAVENOUS; SUBCUTANEOUS at 21:03

## 2022-11-02 RX ADMIN — VENLAFAXINE HYDROCHLORIDE 187.5 MG: 37.5 CAPSULE, EXTENDED RELEASE ORAL at 09:06

## 2022-11-02 RX ADMIN — ALPRAZOLAM 0.5 MG: 0.5 TABLET ORAL at 21:11

## 2022-11-02 RX ADMIN — METOPROLOL TARTRATE 25 MG: 25 TABLET, FILM COATED ORAL at 08:58

## 2022-11-02 RX ADMIN — HEPARIN SODIUM 5000 UNITS: 5000 INJECTION INTRAVENOUS; SUBCUTANEOUS at 05:57

## 2022-11-02 RX ADMIN — HEPARIN SODIUM 5000 UNITS: 5000 INJECTION INTRAVENOUS; SUBCUTANEOUS at 13:00

## 2022-11-02 RX ADMIN — QUETIAPINE FUMARATE 50 MG: 25 TABLET ORAL at 17:48

## 2022-11-02 RX ADMIN — GABAPENTIN 300 MG: 300 CAPSULE ORAL at 21:03

## 2022-11-02 RX ADMIN — TRAMADOL HYDROCHLORIDE 50 MG: 50 TABLET, COATED ORAL at 14:36

## 2022-11-02 NOTE — PLAN OF CARE
Problem: PAIN - ADULT  Goal: Verbalizes/displays adequate comfort level or baseline comfort level  Description: Interventions:  - Encourage patient to monitor pain and request assistance  - Assess pain using appropriate pain scale  - Administer analgesics based on type and severity of pain and evaluate response  - Implement non-pharmacological measures as appropriate and evaluate response  - Consider cultural and social influences on pain and pain management  - Notify physician/advanced practitioner if interventions unsuccessful or patient reports new pain  Outcome: Progressing     Problem: INFECTION - ADULT  Goal: Absence or prevention of progression during hospitalization  Description: INTERVENTIONS:  - Assess and monitor for signs and symptoms of infection  - Monitor lab/diagnostic results  - Monitor all insertion sites, i e  indwelling lines, tubes, and drains  - Monitor endotracheal if appropriate and nasal secretions for changes in amount and color  - Cliff appropriate cooling/warming therapies per order  - Administer medications as ordered  - Instruct and encourage patient and family to use good hand hygiene technique  - Identify and instruct in appropriate isolation precautions for identified infection/condition  Outcome: Progressing     Problem: SAFETY ADULT  Goal: Patient will remain free of falls  Description: INTERVENTIONS:  - Educate patient/family on patient safety including physical limitations  - Instruct patient to call for assistance with activity   - Consult OT/PT to assist with strengthening/mobility   - Keep Call bell within reach  - Keep bed low and locked with side rails adjusted as appropriate  - Keep care items and personal belongings within reach  - Initiate and maintain comfort rounds  - Make Fall Risk Sign visible to staff  - Offer Toileting every 2 Hours, in advance of need  - Initiate/Maintain bed alarm  - Obtain necessary fall risk management equipment: walker  - Apply yellow socks and bracelet for high fall risk patients  - Consider moving patient to room near nurses station  Outcome: Progressing  Goal: Maintain or return to baseline ADL function  Description: INTERVENTIONS:  -  Assess patient's ability to carry out ADLs; assess patient's baseline for ADL function and identify physical deficits which impact ability to perform ADLs (bathing, care of mouth/teeth, toileting, grooming, dressing, etc )  - Assess/evaluate cause of self-care deficits   - Assess range of motion  - Assess patient's mobility; develop plan if impaired  - Assess patient's need for assistive devices and provide as appropriate  - Encourage maximum independence but intervene and supervise when necessary  - Involve family in performance of ADLs  - Assess for home care needs following discharge   - Consider OT consult to assist with ADL evaluation and planning for discharge  - Provide patient education as appropriate  Outcome: Progressing  Goal: Maintains/Returns to pre admission functional level  Description: INTERVENTIONS:  - Perform BMAT or MOVE assessment daily    - Set and communicate daily mobility goal to care team and patient/family/caregiver  - Collaborate with rehabilitation services on mobility goals if consulted  - Perform Range of Motion 4-6 times a day  - Reposition patient every 2 hours    - Dangle patient 3-4 times a day  - Stand patient 3 times a day  - Ambulate patient 3-4 times a day  - Out of bed for toileting  - Record patient progress and toleration of activity level   Outcome: Progressing     Problem: DISCHARGE PLANNING  Goal: Discharge to home or other facility with appropriate resources  Description: INTERVENTIONS:  - Identify barriers to discharge w/patient and caregiver  - Arrange for needed discharge resources and transportation as appropriate  - Identify discharge learning needs (meds, wound care, etc )  - Arrange for interpretive services to assist at discharge as needed  - Refer to Case Management Department for coordinating discharge planning if the patient needs post-hospital services based on physician/advanced practitioner order or complex needs related to functional status, cognitive ability, or social support system  Outcome: Progressing     Problem: Knowledge Deficit  Goal: Patient/family/caregiver demonstrates understanding of disease process, treatment plan, medications, and discharge instructions  Description: Complete learning assessment and assess knowledge base    Interventions:  - Provide teaching at level of understanding  - Provide teaching via preferred learning methods  Outcome: Progressing     Problem: Potential for Falls  Goal: Patient will remain free of falls  Description: INTERVENTIONS:  - Educate patient/family on patient safety including physical limitations  - Instruct patient to call for assistance with activity   - Consult OT/PT to assist with strengthening/mobility   - Keep Call bell within reach  - Keep bed low and locked with side rails adjusted as appropriate  - Keep care items and personal belongings within reach  - Initiate and maintain comfort rounds  - Make Fall Risk Sign visible to staff  - Offer Toileting every 2 Hours, in advance of need  - Initiate/Maintain bed alarm  - Obtain necessary fall risk management equipment: walker  - Apply yellow socks and bracelet for high fall risk patients  - Consider moving patient to room near nurses station  Outcome: Progressing     Problem: MOBILITY - ADULT  Goal: Maintain or return to baseline ADL function  Description: INTERVENTIONS:  -  Assess patient's ability to carry out ADLs; assess patient's baseline for ADL function and identify physical deficits which impact ability to perform ADLs (bathing, care of mouth/teeth, toileting, grooming, dressing, etc )  - Assess/evaluate cause of self-care deficits   - Assess range of motion  - Assess patient's mobility; develop plan if impaired  - Assess patient's need for assistive devices and provide as appropriate  - Encourage maximum independence but intervene and supervise when necessary  - Involve family in performance of ADLs  - Assess for home care needs following discharge   - Consider OT consult to assist with ADL evaluation and planning for discharge  - Provide patient education as appropriate  Outcome: Progressing  Goal: Maintains/Returns to pre admission functional level  Description: INTERVENTIONS:  - Perform BMAT or MOVE assessment daily    - Set and communicate daily mobility goal to care team and patient/family/caregiver  - Collaborate with rehabilitation services on mobility goals if consulted  - Perform Range of Motion 4-6 times a day  - Reposition patient every 2 hours  - Dangle patient 3-4 times a day  - Stand patient 3 times a day  - Ambulate patient 3-4 times a day  - Out of bed for toileting  - Record patient progress and toleration of activity level   Outcome: Progressing     Problem: Prexisting or High Potential for Compromised Skin Integrity  Goal: Skin integrity is maintained or improved  Description: INTERVENTIONS:  - Identify patients at risk for skin breakdown  - Assess and monitor skin integrity  - Assess and monitor nutrition and hydration status  - Monitor labs   - Assess for incontinence   - Turn and reposition patient  - Assist with mobility/ambulation  - Relieve pressure over bony prominences  - Avoid friction and shearing  - Provide appropriate hygiene as needed including keeping skin clean and dry  - Evaluate need for skin moisturizer/barrier cream  - Collaborate with interdisciplinary team   - Patient/family teaching  - Consider wound care consult   Outcome: Progressing     Problem: Nutrition/Hydration-ADULT  Goal: Nutrient/Hydration intake appropriate for improving, restoring or maintaining nutritional needs  Description: Monitor and assess patient's nutrition/hydration status for malnutrition   Collaborate with interdisciplinary team and initiate plan and interventions as ordered  Monitor patient's weight and dietary intake as ordered or per policy  Utilize nutrition screening tool and intervene as necessary  Determine patient's food preferences and provide high-protein, high-caloric foods as appropriate  INTERVENTIONS:  - Monitor oral intake, urinary output, labs, and treatment plans  - Assess nutrition and hydration status and recommend course of action  - Evaluate amount of meals eaten  - Assist patient with eating if necessary   - Allow adequate time for meals  - Recommend/ encourage appropriate diets, oral nutritional supplements, and vitamin/mineral supplements  - Order, calculate, and assess calorie counts as needed  - Recommend, monitor, and adjust tube feedings and TPN/PPN based on assessed needs  - Assess need for intravenous fluids  - Provide specific nutrition/hydration education as appropriate  - Include patient/family/caregiver in decisions related to nutrition  Outcome: Progressing     Problem: NEUROSENSORY - ADULT  Goal: Achieves maximal functionality and self care  Description: INTERVENTIONS  - Monitor swallowing and airway patency with patient fatigue and changes in neurological status  - Encourage and assist patient to increase activity and self care     - Encourage visually impaired, hearing impaired and aphasic patients to use assistive/communication devices  Outcome: Progressing  Goal: Achieves stable or improved neurological status  Description: INTERVENTIONS  - Monitor and report changes in neurological status  - Monitor vital signs such as temperature, blood pressure, glucose, and any other labs ordered   - Initiate measures to prevent increased intracranial pressure  - Monitor for seizure activity and implement precautions if appropriate      Outcome: Progressing     Problem: CARDIOVASCULAR - ADULT  Goal: Maintains optimal cardiac output and hemodynamic stability  Description: INTERVENTIONS:  - Monitor I/O, vital signs and rhythm  - Monitor for S/S and trends of decreased cardiac output  - Administer and titrate ordered vasoactive medications to optimize hemodynamic stability  - Assess quality of pulses, skin color and temperature  - Assess for signs of decreased coronary artery perfusion  - Instruct patient to report change in severity of symptoms  Outcome: Progressing     Problem: RESPIRATORY - ADULT  Goal: Achieves optimal ventilation and oxygenation  Description: INTERVENTIONS:  - Assess for changes in respiratory status  - Assess for changes in mentation and behavior  - Position to facilitate oxygenation and minimize respiratory effort  - Oxygen administered by appropriate delivery if ordered  - Initiate smoking cessation education as indicated  - Encourage broncho-pulmonary hygiene including cough, deep breathe, Incentive Spirometry  - Assess the need for suctioning and aspirate as needed  - Assess and instruct to report SOB or any respiratory difficulty  - Respiratory Therapy support as indicated  Outcome: Progressing     Problem: GASTROINTESTINAL - ADULT  Goal: Maintains adequate nutritional intake  Description: INTERVENTIONS:  - Monitor percentage of each meal consumed  - Identify factors contributing to decreased intake, treat as appropriate  - Assist with meals as needed  - Monitor I&O, weight, and lab values if indicated  - Obtain nutrition services referral as needed  Outcome: Progressing     Problem: METABOLIC, FLUID AND ELECTROLYTES - ADULT  Goal: Electrolytes maintained within normal limits  Description: INTERVENTIONS:  - Monitor labs and assess patient for signs and symptoms of electrolyte imbalances  - Administer electrolyte replacement as ordered  - Monitor response to electrolyte replacements, including repeat lab results as appropriate  - Instruct patient on fluid and nutrition as appropriate  Outcome: Progressing  Goal: Fluid balance maintained  Description: INTERVENTIONS:  - Monitor labs   - Monitor I/O and WT  - Instruct patient on fluid and nutrition as appropriate  - Assess for signs & symptoms of volume excess or deficit  Outcome: Progressing     Problem: SKIN/TISSUE INTEGRITY - ADULT  Goal: Skin Integrity remains intact(Skin Breakdown Prevention)  Description: Assess:  -Perform Erickson assessment every shift  -Clean and moisturize skin every day  -Inspect skin when repositioning, toileting, and assisting with ADLS  -Assess extremities for adequate circulation and sensation     Bed Management:  -Have minimal linens on bed & keep smooth, unwrinkled  -Change linens as needed when moist or perspiring  -Avoid sitting or lying in one position for more than 2 hours while in bed  -Keep HOB at 45 degrees     Toileting:  -Offer bedside commode  -Assess for incontinence every 2 hours  -Use incontinent care products after each incontinent episode such as brief    Activity:  -Mobilize patient 3-4 times a day  -Encourage activity and walks on unit  -Encourage or provide ROM exercises   -Turn and reposition patient every 2 Hours  -Use appropriate equipment to lift or move patient in bed  -Instruct/ Assist with weight shifting every 2 when out of bed in chair  -Consider limitation of chair time 2 hour intervals    Skin Care:  -Avoid use of baby powder, tape, friction and shearing, hot water or constrictive clothing  -Relieve pressure over bony prominences using pillows  -Do not massage red bony areas    Next Steps:  -Teach patient strategies to minimize risks such as walker  -Consider consults to  interdisciplinary teams such as wound  Outcome: Progressing  Goal: Incision(s), wounds(s) or drain site(s) healing without S/S of infection  Description: INTERVENTIONS  - Assess and document dressing, incision, wound bed, drain sites and surrounding tissue  - Provide patient and family education  - Perform skin care/dressing changes every PRN  Outcome: Progressing     Problem: MUSCULOSKELETAL - ADULT  Goal: Maintain or return mobility to safest level of function  Description: INTERVENTIONS:  - Assess patient's ability to carry out ADLs; assess patient's baseline for ADL function and identify physical deficits which impact ability to perform ADLs (bathing, care of mouth/teeth, toileting, grooming, dressing, etc )  - Assess/evaluate cause of self-care deficits   - Assess range of motion  - Assess patient's mobility  - Assess patient's need for assistive devices and provide as appropriate  - Encourage maximum independence but intervene and supervise when necessary  - Involve family in performance of ADLs  - Assess for home care needs following discharge   - Consider OT consult to assist with ADL evaluation and planning for discharge  - Provide patient education as appropriate  Outcome: Progressing

## 2022-11-02 NOTE — ASSESSMENT & PLAN NOTE
Ongoing DC planning together with case management and     At this time we are working on eventually getting her home with [facility] care and outpatient or home therapy  Not ruling out the possibility of inpatient with good Amanda Pierce

## 2022-11-02 NOTE — PLAN OF CARE
Problem: OCCUPATIONAL THERAPY ADULT  Goal: Performs self-care activities at highest level of function for planned discharge setting  See evaluation for individualized goals  Description: Treatment Interventions: ADL retraining, Functional transfer training, UE strengthening/ROM, Endurance training, Cognitive reorientation, Patient/family training, Equipment evaluation/education, Compensatory technique education, Continued evaluation          See flowsheet documentation for full assessment, interventions and recommendations  Note: Limitation: Decreased ADL status, Decreased Safe judgement during ADL, Decreased cognition, Decreased endurance, Decreased high-level ADLs     Assessment: Pt seen for 30 min tx session with focus on functional balance, functional mobility, ADL status, transfer safety, and cognition  Pt able to demonstrate good LE ADL status  Pt required verbal/physical cues to maintain transfer safety; sitting balance=f+/f, standing balance=f/f-  Cognitive deficits again noted--attention, direction-following, memory, orientation, motor planning, problem-solving  Pt pleasant and cooperative with tx session  Will continue       OT Discharge Recommendation: Post acute rehabilitation services

## 2022-11-02 NOTE — OCCUPATIONAL THERAPY NOTE
Occupational Therapy Progress Note(time=0925-0955)     Patient Name: Sakshi Garcia  Today's Date: 11/2/2022  Problem List  Principal Problem:    Toxic metabolic encephalopathy  Active Problems:    Depression    Tobacco abuse    DDD (degenerative disc disease), lumbosacral    ARF (acute renal failure) (HCC)    Transaminitis    Abnormal CT of the chest    Traumatic rhabdomyolysis (HCC)    D-dimer, elevated    Leg edema, right    Localized swelling of right upper extremity    Bacteremia due to methicillin susceptible Staphylococcus aureus (MSSA)    Pulmonary edema    Aspiration pneumonitis (HCC)    Hypokalemia    Hypernatremia    Diarrhea    Mild protein-calorie malnutrition (HCC)    Moderate protein-calorie malnutrition (HCC)    Ambulatory dysfunction            11/02/22 09   Note Type   Note Type Treatment   Pain Assessment   Pain Assessment Tool FLACC   Pain Location/Orientation Orientation: Right;[place] Leg   Pain Rating: FLACC (Rest) - Face 0   Pain Rating: FLACC (Rest) - Legs 0   Pain Rating: FLACC (Rest) - Activity 0   Pain Rating: FLACC (Rest) - Cry 1   Pain Rating: FLACC (Rest) - Consolability 0   Score: FLACC (Rest) 1   Restrictions/Precautions   Weight Bearing Precautions Per Order No   Other Precautions 1:1;Chair Alarm; Bed Alarm; Fall Risk   ADL   Where Assessed Edge of bed   LB Dressing Assistance 5  Supervision/Setup   LB Dressing Deficit Don/doff R shoe;Don/doff L shoe   Functional Standing Tolerance   Time 3-5 mins   Bed Mobility   Rolling R 5  Supervision   Rolling L 5  Supervision   Supine to Sit 5  Supervision   Sit to Supine 5  Supervision   Transfers   Sit to Stand 4  Minimal assistance   Additional items Assist x 1; Increased time required;Verbal cues   Stand to Sit 4  Minimal assistance   Additional items Assist x 1; Increased time required;Verbal cues   Functional Mobility   Functional Mobility 4  Minimal assistance   Additional Comments x1   Additional items Rolling walker   Subjective Subjective "It's February "   Cognition   Overall Cognitive Status Impaired   Arousal/Participation Alert   Attention Attends with cues to redirect   Orientation Level Oriented to person;Oriented to place; Disoriented to time;Disoriented to situation   Memory Decreased short term memory;Decreased recall of recent events;Decreased recall of precautions   Following Commands Follows one step commands with increased time or repetition   Activity Tolerance   Activity Tolerance Patient tolerated treatment well   Medical Staff Made Aware nsg, P T , CM   Assessment   Assessment Pt seen for 30 min tx session with focus on functional balance, functional mobility, ADL status, transfer safety, and cognition  Pt able to demonstrate good LE ADL status  Pt required verbal/physical cues to maintain transfer safety; sitting balance=f+/f, standing balance=f/f-  Cognitive deficits again noted--attention, direction-following, memory, orientation, motor planning, problem-solving  Pt pleasant and cooperative with tx session  Will continue  Plan   Treatment Interventions ADL retraining;Functional transfer training;UE strengthening/ROM; Endurance training;Cognitive reorientation;Patient/family training;Equipment evaluation/education; Compensatory technique education;Continued evaluation   Goal Expiration Date 11/15/22   OT Treatment Day 12   OT Frequency 2-3x/wk   Recommendation   OT Discharge Recommendation Post acute rehabilitation services   AM-PAC Daily Activity Inpatient   Lower Body Dressing 3   Bathing 3   Toileting 3   Upper Body Dressing 3   Grooming 3   Eating 3   Daily Activity Raw Score 18   Daily Activity Standardized Score (Calc for Raw Score >=11) 38 66   Turning Head Towards Sound 3   Follow Simple Instructions 3   Low Function Daily Activity Raw Score 24   Low Function Daily Activity Standardized Score 36 94   AM-PAC Applied Cognition Inpatient   Following a Speech/Presentation 3   Understanding Ordinary Conversation 2 Taking Medications 2   Remembering Where Things Are Placed or Put Away 2   Remembering List of 4-5 Errands 1   Taking Care of Complicated Tasks 1   Applied Cognition Raw Score 11   Applied Cognition Standardized Score 27 03   Dariana De Anda

## 2022-11-02 NOTE — PLAN OF CARE
Problem: PHYSICAL THERAPY ADULT  Goal: Performs mobility at highest level of function for planned discharge setting  See evaluation for individualized goals  Description: Treatment/Interventions: Functional transfer training, LE strengthening/ROM, Elevations, Therapeutic exercise, Cognitive reorientation, Patient/family training, Equipment eval/education, Bed mobility, Gait training, Compensatory technique education, Continued evaluation, Spoke to nursing, Spoke to case management, Spoke to MD, OT, ST  Equipment Recommended: Bal[employer] Elizondo (if patient does not own)       See flowsheet documentation for full assessment, interventions and recommendations  Outcome: Progressing  Note: Prognosis: Fair  Problem List: Decreased strength, Decreased endurance, Impaired balance, Decreased mobility, Decreased cognition, Impaired judgement, Decreased safety awareness, Pain  Assessment: Pt seen for PT per POC  R MAFO fitting & application completed  Good fitting achieved  Pt tolerated R MAFO during amb  Pt to use R MAFO during amb  Pt continue to show good progress in PT  Pt progressed to S for bed mobility however pt continue to require minAx1 for transfers & amb w/ RW + cues for techniques & safety  Gait deviations as above, still unsteady w/ RLE scissoring but no gross LOB noted  Improved R heelstrike w/ R MAFO  Pt still tends to veer to the L during amb require assistance for RW mgt to prevent from crashing to walls & obstacles  Pt able to progress to stair training, requiring minAx1 + max cues for stair sequencing & safety  Pt appeared nervous & somewhat got disoriented during stair training w/ noted inc unsteadiness in gait, inc scissoring & inc impaired motor planning  Pt w/ inc fatigue after tx session hence assisted back in bed per pt request  Please see flowsheet above for objective findings & levels of assistance required for all functional tasks during this tx session   Nsg staff most recent vital signs as follows: BP 121/78 (BP [place] Left arm)   Pulse 94   Temp 97 9 °F (36 6 °C) (Oral)   Resp 18   Ht 5' 6" (1 676 m)   Wt 51 7 kg (113 lb 15 7 oz)   LMP 03/14/2019 (Approximate)   SpO2 99%   BMI 18 40 kg/m²   Will continue PT per POC  At end of session, pt back in bed in stable condition, call bell & phone in reach, bed alarm activated  Fall precautions reinforced w/ good understanding  The patient's AM-PAC Basic Mobility Inpatient Short Form Raw Score is 17  A Raw score of greater than 16 suggests the patient may benefit from discharge to home  Please also refer to the recommendation of the Physical Therapist for safe discharge planning  From PT standpoint, despite AM-PAC score, STR still beneficial at this point  If STR not possible, pt may return home w/ HHPT but will need 24/7 assistance at home as pt unsafe home alone at any time  CM to follow  Nsg staff to continue to mobilized pt (OOB in chair for all meals & ambulate in room/unit) as tolerated to prevent decline in function  Nsg notified  Barriers to Discharge: Decreased caregiver support, Inaccessible home environment  Barriers to Discharge Comments: unable to identify at this time  PT Discharge Recommendation: Post acute rehabilitation services (STR beneficial at this point; pt may return home w/ HHPT if 24/7 assistance is available as pt unsafe home alone)    See flowsheet documentation for full assessment

## 2022-11-02 NOTE — CASE MANAGEMENT
Case Management Discharge Planning Note    Patient name Gretchen Simmons  [place] [address] [address] Nicole Jacob* MRN [id number]  : 1970 Date 2022       Current Admission Date: 2022  Current Admission Diagnosis:Toxic metabolic encephalopathy   Patient Active Problem List    Diagnosis Date Noted   • Ambulatory dysfunction 2022   • Moderate protein-calorie malnutrition ([facility] [address] ) 10/25/2022   • Mild protein-calorie malnutrition ([address] ) 10/09/2022   • Diarrhea 10/03/2022   • Hypokalemia 10/02/2022   • Hypernatremia 10/02/2022   • Aspiration pneumonitis ([facility] [address] ) 10/01/2022   • Pulmonary edema 2022   • Bacteremia due to methicillin susceptible Staphylococcus aureus (MSSA) 2022   • Localized swelling of right upper extremity 2022   • Leg edema, right 2022   • D-dimer, elevated 2022   • ARF (acute renal failure) ([facility] [address] ) 2022   • Transaminitis 2022   • Toxic metabolic encephalopathy    • Abnormal CT of the chest 2022   • Traumatic rhabdomyolysis ([facility] [address] ) 2022   • Hyperglycemia 2022   • Opioid dependence ([facility] [address] ) 2021   • Chronic right shoulder pain 2021   • Internal hemorrhoids 2020   • Adnexal cyst 2020   • Ischemic colitis ([facility] [address] ) 2020   • Intercostal neuralgia    • Hematochezia 2019   • Insomnia 2019   • History of rib fracture 10/28/2018   • Tobacco abuse 10/28/2018   • Right knee pain 2018   • Generalized anxiety disorder 2018   • Hypertension    • Hyperlipidemia    • Depression    • COPD (chronic obstructive pulmonary disease) ([facility] [address] )    • Chondromalacia patellae 2018   • Irritable bowel syndrome with diarrhea 2018   • DDD (degenerative disc disease), lumbosacral 2014      LOS (days): 44  Geometric Mean LOS (GMLOS) (days):   Days to GMLOS:     OBJECTIVE:  Risk of Unplanned Readmission Score: 28 21         Current admission status: Inpatient   Preferred Pharmacy:   [phone]   [place] [address]  Phone: [phone] Fax: [phone] - [address], [address] [place] [address] [place] [address]  Phone: [phone] Fax: [phone]    Primary Care Provider: Tete Byrne MD    Primary Insurance: BLUE CROSS  Secondary Insurance:     DISCHARGE DETAILS:    Other Referral/Resources/Interventions Provided:  Interventions: C  Referral Comments: Additional [facility] referrals sent to see if in-network agencies have the same copay as [facility]  Additional Comments: Per [facility] communication, [facility] in home care would have a $60 copay per visit and an 8 week wait for speech therapy  CM called and spoke with pt's  who requested additional referrals to see if any other agencies do not have the copay or the wait time for ST  Pt's  reports that he is still working on obtaining 24/7 care in the home as he will be off for a while once pt is discharged but there are gaps that would need to be filled if he were to need to go to the grocery store, etc  [facility] referrals placed, awaiting determinations  CM department to follow

## 2022-11-02 NOTE — PLAN OF CARE
Problem: PAIN - ADULT  Goal: Verbalizes/displays adequate comfort level or baseline comfort level  Description: Interventions:  - Encourage patient to monitor pain and request assistance  - Assess pain using appropriate pain scale  - Administer analgesics based on type and severity of pain and evaluate response  - Implement non-pharmacological measures as appropriate and evaluate response  - Consider cultural and social influences on pain and pain management  - Notify physician/advanced practitioner if interventions unsuccessful or patient reports new pain  Outcome: Progressing     Problem: INFECTION - ADULT  Goal: Absence or prevention of progression during hospitalization  Description: INTERVENTIONS:  - Assess and monitor for signs and symptoms of infection  - Monitor lab/diagnostic results  - Monitor all insertion sites, i e  indwelling lines, tubes, and drains  - Monitor endotracheal if appropriate and nasal secretions for changes in amount and color  - [place] appropriate cooling/warming therapies per order  - Administer medications as ordered  - Instruct and encourage patient and family to use good hand hygiene technique  - Identify and instruct in appropriate isolation precautions for identified infection/condition  Outcome: Progressing     Problem: SAFETY ADULT  Goal: Patient will remain free of falls  Description: INTERVENTIONS:  - Educate patient/family on patient safety including physical limitations  - Instruct patient to call for assistance with activity   - Consult OT/PT to assist with strengthening/mobility   - Keep Call bell within reach  - Keep bed low and locked with side rails adjusted as appropriate  - Keep care items and personal belongings within reach  - Initiate and maintain comfort rounds  - Make Fall Risk Sign visible to staff  - Offer Toileting every 2 Hours, in advance of need  - Initiate/Maintain bed alarm  - Obtain necessary fall risk management equipment: walker  - Apply yellow socks and bracelet for high fall risk patients  - Consider moving patient to room near nurses station  Outcome: Progressing  Goal: Maintain or return to baseline ADL function  Description: INTERVENTIONS:  -  Assess patient's ability to carry out ADLs; assess patient's baseline for ADL function and identify physical deficits which impact ability to perform ADLs (bathing, care of mouth/teeth, toileting, grooming, dressing, etc )  - Assess/evaluate cause of self-care deficits   - Assess range of motion  - Assess patient's mobility; develop plan if impaired  - Assess patient's need for assistive devices and provide as appropriate  - Encourage maximum independence but intervene and supervise when necessary  - Involve family in performance of ADLs  - Assess for home care needs following discharge   - Consider OT consult to assist with ADL evaluation and planning for discharge  - Provide patient education as appropriate  Outcome: Progressing  Goal: Maintains/Returns to pre admission functional level  Description: INTERVENTIONS:  - Perform BMAT or MOVE assessment daily    - Set and communicate daily mobility goal to care team and patient/family/caregiver  - Collaborate with rehabilitation services on mobility goals if consulted  - Perform Range of Motion 4-6 times a day  - Reposition patient every 2 hours    - Dangle patient 3-4 times a day  - Stand patient 3 times a day  - Ambulate patient 3-4 times a day  - Out of bed for toileting  - Record patient progress and toleration of activity level   Outcome: Progressing     Problem: DISCHARGE PLANNING  Goal: Discharge to home or other facility with appropriate resources  Description: INTERVENTIONS:  - Identify barriers to discharge w/patient and caregiver  - Arrange for needed discharge resources and transportation as appropriate  - Identify discharge learning needs (meds, wound care, etc )  - Arrange for interpretive services to assist at discharge as needed  - Refer to Case Management Department for coordinating discharge planning if the patient needs post-hospital services based on physician/advanced practitioner order or complex needs related to functional status, cognitive ability, or social support system  Outcome: Progressing     Problem: Knowledge Deficit  Goal: Patient/family/caregiver demonstrates understanding of disease process, treatment plan, medications, and discharge instructions  Description: Complete learning assessment and assess knowledge base    Interventions:  - Provide teaching at level of understanding  - Provide teaching via preferred learning methods  Outcome: Progressing     Problem: Potential for Falls  Goal: Patient will remain free of falls  Description: INTERVENTIONS:  - Educate patient/family on patient safety including physical limitations  - Instruct patient to call for assistance with activity   - Consult OT/PT to assist with strengthening/mobility   - Keep Call bell within reach  - Keep bed low and locked with side rails adjusted as appropriate  - Keep care items and personal belongings within reach  - Initiate and maintain comfort rounds  - Make Fall Risk Sign visible to staff  - Offer Toileting every 2 Hours, in advance of need  - Initiate/Maintain bed alarm  - Obtain necessary fall risk management equipment: walker  - Apply yellow socks and bracelet for high fall risk patients  - Consider moving patient to room near nurses station  Outcome: Progressing     Problem: MOBILITY - ADULT  Goal: Maintain or return to baseline ADL function  Description: INTERVENTIONS:  -  Assess patient's ability to carry out ADLs; assess patient's baseline for ADL function and identify physical deficits which impact ability to perform ADLs (bathing, care of mouth/teeth, toileting, grooming, dressing, etc )  - Assess/evaluate cause of self-care deficits   - Assess range of motion  - Assess patient's mobility; develop plan if impaired  - Assess patient's need for assistive devices and provide as appropriate  - Encourage maximum independence but intervene and supervise when necessary  - Involve family in performance of ADLs  - Assess for home care needs following discharge   - Consider OT consult to assist with ADL evaluation and planning for discharge  - Provide patient education as appropriate  Outcome: Progressing  Goal: Maintains/Returns to pre admission functional level  Description: INTERVENTIONS:  - Perform BMAT or MOVE assessment daily    - Set and communicate daily mobility goal to care team and patient/family/caregiver  - Collaborate with rehabilitation services on mobility goals if consulted  - Perform Range of Motion 4-6 times a day  - Reposition patient every 2 hours  - Dangle patient 3-4 times a day  - Stand patient 3 times a day  - Ambulate patient 3-4 times a day  - Out of bed for toileting  - Record patient progress and toleration of activity level   Outcome: Progressing     Problem: Prexisting or High Potential for Compromised Skin Integrity  Goal: Skin integrity is maintained or improved  Description: INTERVENTIONS:  - Identify patients at risk for skin breakdown  - Assess and monitor skin integrity  - Assess and monitor nutrition and hydration status  - Monitor labs   - Assess for incontinence   - Turn and reposition patient  - Assist with mobility/ambulation  - Relieve pressure over bony prominences  - Avoid friction and shearing  - Provide appropriate hygiene as needed including keeping skin clean and dry  - Evaluate need for skin moisturizer/barrier cream  - Collaborate with interdisciplinary team   - Patient/family teaching  - Consider wound care consult   Outcome: Progressing     Problem: Nutrition/Hydration-ADULT  Goal: Nutrient/Hydration intake appropriate for improving, restoring or maintaining nutritional needs  Description: Monitor and assess patient's nutrition/hydration status for malnutrition   Collaborate with interdisciplinary team and initiate plan and interventions as ordered  Monitor patient's weight and dietary intake as ordered or per policy  Utilize nutrition screening tool and intervene as necessary  Determine patient's food preferences and provide high-protein, high-caloric foods as appropriate  INTERVENTIONS:  - Monitor oral intake, urinary output, labs, and treatment plans  - Assess nutrition and hydration status and recommend course of action  - Evaluate amount of meals eaten  - Assist patient with eating if necessary   - Allow adequate time for meals  - Recommend/ encourage appropriate diets, oral nutritional supplements, and vitamin/mineral supplements  - Order, calculate, and assess calorie counts as needed  - Recommend, monitor, and adjust tube feedings and TPN/PPN based on assessed needs  - Assess need for intravenous fluids  - Provide specific nutrition/hydration education as appropriate  - Include patient/family/caregiver in decisions related to nutrition  Outcome: Progressing     Problem: NEUROSENSORY - ADULT  Goal: Achieves maximal functionality and self care  Description: INTERVENTIONS  - Monitor swallowing and airway patency with patient fatigue and changes in neurological status  - Encourage and assist patient to increase activity and self care     - Encourage visually impaired, hearing impaired and aphasic patients to use assistive/communication devices  Outcome: Progressing  Goal: Achieves stable or improved neurological status  Description: INTERVENTIONS  - Monitor and report changes in neurological status  - Monitor vital signs such as temperature, blood pressure, glucose, and any other labs ordered   - Initiate measures to prevent increased intracranial pressure  - Monitor for seizure activity and implement precautions if appropriate      Outcome: Progressing     Problem: CARDIOVASCULAR - ADULT  Goal: Maintains optimal cardiac output and hemodynamic stability  Description: INTERVENTIONS:  - Monitor I/O, vital signs and rhythm  - Monitor for S/S and trends of decreased cardiac output  - Administer and titrate ordered vasoactive medications to optimize hemodynamic stability  - Assess quality of pulses, skin color and temperature  - Assess for signs of decreased coronary artery perfusion  - Instruct patient to report change in severity of symptoms  Outcome: Progressing     Problem: RESPIRATORY - ADULT  Goal: Achieves optimal ventilation and oxygenation  Description: INTERVENTIONS:  - Assess for changes in respiratory status  - Assess for changes in mentation and behavior  - Position to facilitate oxygenation and minimize respiratory effort  - Oxygen administered by appropriate delivery if ordered  - Initiate smoking cessation education as indicated  - Encourage broncho-pulmonary hygiene including cough, deep breathe, Incentive Spirometry  - Assess the need for suctioning and aspirate as needed  - Assess and instruct to report SOB or any respiratory difficulty  - Respiratory Therapy support as indicated  Outcome: Progressing     Problem: GASTROINTESTINAL - ADULT  Goal: Maintains adequate nutritional intake  Description: INTERVENTIONS:  - Monitor percentage of each meal consumed  - Identify factors contributing to decreased intake, treat as appropriate  - Assist with meals as needed  - Monitor I&O, weight, and lab values if indicated  - Obtain nutrition services referral as needed  Outcome: Progressing     Problem: METABOLIC, FLUID AND ELECTROLYTES - ADULT  Goal: Electrolytes maintained within normal limits  Description: INTERVENTIONS:  - Monitor labs and assess patient for signs and symptoms of electrolyte imbalances  - Administer electrolyte replacement as ordered  - Monitor response to electrolyte replacements, including repeat lab results as appropriate  - Instruct patient on fluid and nutrition as appropriate  Outcome: Progressing  Goal: Fluid balance maintained  Description: INTERVENTIONS:  - Monitor labs   - Monitor I/O and WT  - Instruct patient on fluid and nutrition as appropriate  - Assess for signs & symptoms of volume excess or deficit  Outcome: Progressing     Problem: SKIN/TISSUE INTEGRITY - ADULT  Goal: Skin Integrity remains intact(Skin Breakdown Prevention)  Description: Assess:  -Perform Erickson assessment every shift  -Clean and moisturize skin every day  -Inspect skin when repositioning, toileting, and assisting with ADLS  -Assess extremities for adequate circulation and sensation     Bed Management:  -Have minimal linens on bed & keep smooth, unwrinkled  -Change linens as needed when moist or perspiring  -Avoid sitting or lying in one position for more than 2 hours while in bed  -Keep HOB at 45 degrees     Toileting:  -Offer bedside commode  -Assess for incontinence every 2 hours  -Use incontinent care products after each incontinent episode such as brief    Activity:  -Mobilize patient 3-4 times a day  -Encourage activity and walks on unit  -Encourage or provide ROM exercises   -Turn and reposition patient every 2 Hours  -Use appropriate equipment to lift or move patient in bed  -Instruct/ Assist with weight shifting every 2 when out of bed in chair  -Consider limitation of chair time 2 hour intervals    Skin Care:  -Avoid use of baby powder, tape, friction and shearing, hot water or constrictive clothing  -Relieve pressure over bony prominences using pillows  -Do not massage red bony areas    Next Steps:  -Teach patient strategies to minimize risks such as walker  -Consider consults to  interdisciplinary teams such as wound  Outcome: Progressing  Goal: Incision(s), wounds(s) or drain site(s) healing without S/S of infection  Description: INTERVENTIONS  - Assess and document dressing, incision, wound bed, drain sites and surrounding tissue  - Provide patient and family education  - Perform skin care/dressing changes every PRN  Outcome: Progressing     Problem: MUSCULOSKELETAL - ADULT  Goal: Maintain or return mobility to safest level of function  Description: INTERVENTIONS:  - Assess patient's ability to carry out ADLs; assess patient's baseline for ADL function and identify physical deficits which impact ability to perform ADLs (bathing, care of mouth/teeth, toileting, grooming, dressing, etc )  - Assess/evaluate cause of self-care deficits   - Assess range of motion  - Assess patient's mobility  - Assess patient's need for assistive devices and provide as appropriate  - Encourage maximum independence but intervene and supervise when necessary  - Involve family in performance of ADLs  - Assess for home care needs following discharge   - Consider OT consult to assist with ADL evaluation and planning for discharge  - Provide patient education as appropriate  Outcome: Progressing

## 2022-11-02 NOTE — PHYSICAL THERAPY NOTE
PT PROGRESS NOTE    Name: Rai Ray  AGE: 45 y o  MRN: [id number]  LENGTH OF STAY: 40    Admitting Dx:  Hyponatremia [E87 1]  Altered mental status [R41 82]  ARF (acute renal failure) ([facility]) [N17 9]  Hypoglycemia [E16 2]  Transaminitis [R74 01]  Elevated liver enzymes [R74 8]  Positive D dimer [R79 89]  ANUJA (acute kidney injury) ([address] ) [N17 9]  Swelling of right foot [M79 89]      Patient Active Problem List   Diagnosis    Irritable bowel syndrome with diarrhea    Hypertension    Hyperlipidemia    Depression    COPD (chronic obstructive pulmonary disease) ([facility])    Generalized anxiety disorder    Right knee pain    History of rib fracture    Tobacco abuse    Chondromalacia patellae    DDD (degenerative disc disease), lumbosacral    Insomnia    Hematochezia    Intercostal neuralgia    Ischemic colitis ([facility] [address] )    Internal hemorrhoids    Adnexal cyst    Chronic right shoulder pain    Opioid dependence ([facility])    Hyperglycemia    ARF (acute renal failure) ([facility])    Transaminitis    Toxic metabolic encephalopathy    Abnormal CT of the chest    Traumatic rhabdomyolysis ([facility])    D-dimer, elevated    Leg edema, right    Localized swelling of right upper extremity    Bacteremia due to methicillin susceptible Staphylococcus aureus (MSSA)    Pulmonary edema    Aspiration pneumonitis ([facility])    Hypokalemia    Hypernatremia    Diarrhea    Mild protein-calorie malnutrition ([facility])    Moderate protein-calorie malnutrition ([facility] [address] )    Ambulatory dysfunction               11/02/22 1000   PT Last Visit   PT Visit Date 11/02/22   Note Type   Note Type Treatment   Type of Brace   Brace Applied Posterior Leaf MAFO (Molded Ankle Foot Orthosis)   Patient Position When Brace Applied Seated   Bracing Recommendations Recommendation (comment)  (use for amb)   Education   Education Provided Yes   End of Consult   Patient Position at End of Consult Supine; All needs within reach;Bed/Chair alarm activated   Nurse Communication Nurse aware of consult, application of brace   Pain Assessment   Pain Score 8   Pain Location/Orientation Orientation: Right;[place] Foot   Hospital Pain Intervention(s) Repositioned; Ambulation/increased activity; Emotional support; Rest   Restrictions/Precautions   Weight Bearing Precautions Per Order No   Braces or Orthoses Other (Comment)  (R MAFO)   Other Precautions 1:1;Cognitive; Chair Alarm; Bed Alarm; Fall Risk;Pain   General   Chart Reviewed Yes   Response to Previous Treatment Patient with no complaints from previous session  Family/Caregiver Present No   Cognition   Overall Cognitive Status Impaired   Arousal/Participation Alert   Attention Attends with cues to redirect   Orientation Level Oriented to person;Oriented to place; Disoriented to time;Disoriented to situation   Following Commands Follows one step commands with increased time or repetition   Comments pleasant & cooperative   Subjective   Subjective Pt agreeable to PT/OT tx  Bed Mobility   Supine to Sit 5  Supervision   Additional items HOB elevated; Increased time required;Verbal cues   Sit to Supine 5  Supervision   Additional items Increased time required;Verbal cues   Additional Comments cues for techniques & safety   Transfers   Sit to Stand 4  Minimal assistance   Additional items Assist x 1; Increased time required;Verbal cues   Stand to Sit 4  Minimal assistance   Additional items Assist x 1; Increased time required;Verbal cues   Additional Comments impulsive at times but can be redirected   Ambulation/Elevation   Gait pattern Decreased foot clearance;Decreased R stance; Steppage;Scissoring; Excessively slow  (scissoring at times R>L; improved R heelstrike w/ MAFO; inc toeing out R foot)   Gait Assistance 4  Minimal assist   Additional items Assist x 1;Verbal cues; Tactile cues   Assistive Device Rolling walker   Distance 400'x1   Stair Management Assistance 4  Minimal assist   Additional items Assist x 1; Tactile cues; Increased time required;Verbal cues   Stair Management Technique Two rails; Step to pattern; Foreward;Nonreciprocal   Number of Stairs 5  (x2;  stairs)   Ambulation/Elevation Additional Comments unsteady w/ stairs & amb but no gross LOB; scissoring at times R>L; improved R heelstrike w/ MAFO; pt continue to tend to veer to the L w/ RW requires assistance for RW mgt to prevent from crashing to walls & obstacles; impaired motor planning during stair mgt despite cues   Balance   Static Sitting Good   Dynamic Sitting Fair +   Static Standing Fair -   Dynamic Standing Poor +   Ambulatory Poor +   Endurance Deficit   Endurance Deficit Yes   Endurance Deficit Description L foot pain; fatigue   Activity Tolerance   Activity Tolerance Patient limited by fatigue;Patient limited by pain   Medical Staff Made Aware OTR Reuben   Nurse Made Aware yes   Assessment   Prognosis Fair   Problem List Decreased strength;Decreased endurance; Impaired balance;Decreased mobility; Decreased cognition; Impaired judgement;Decreased safety awareness;Pain   Assessment Pt seen for PT per POC  R MAFO fitting & application completed  Good fitting achieved  Pt tolerated R MAFO during amb  Pt to use R MAFO during amb  Pt continue to show good progress in PT  Pt progressed to S for bed mobility however pt continue to require minAx1 for transfers & amb w/ RW + cues for techniques & safety  Gait deviations as above, still unsteady w/ RLE scissoring but no gross LOB noted  Improved R heelstrike w/ R MAFO  Pt still tends to veer to the L during amb require assistance for RW mgt to prevent from crashing to walls & obstacles  Pt able to progress to stair training, requiring minAx1 + max cues for stair sequencing & safety  Pt appeared nervous & somewhat got disoriented during stair training w/ noted inc unsteadiness in gait, inc scissoring & inc impaired motor planning   Pt w/ inc fatigue after tx session hence assisted back in bed per pt request  Please see flowsheet above for objective findings & levels of assistance required for all functional tasks during this tx session  Nsg staff most recent vital signs as follows: /78 (BP [place] Left arm)   Pulse 94   Temp 97 9 °F (36 6 °C) (Oral)   Resp 18   Ht 5' 6" (1 676 m)   Wt 51 7 kg (113 lb 15 7 oz)   LMP 03/14/2019 (Approximate)   SpO2 99%   BMI 18 40 kg/m²   Will continue PT per POC  At end of session, pt back in bed in stable condition, call bell & phone in reach, bed alarm activated  Fall precautions reinforced w/ good understanding  The patient's AM-PAC Basic Mobility Inpatient Short Form Raw Score is 17  A Raw score of greater than 16 suggests the patient may benefit from discharge to home  Please also refer to the recommendation of the Physical Therapist for safe discharge planning  From PT standpoint, despite AM-PAC score, STR still beneficial at this point  If STR not possible, pt may return home w/ HHPT but will need 24/7 assistance at home as pt unsafe home alone at any time  CM to follow  Nsg staff to continue to mobilized pt (OOB in chair for all meals & ambulate in room/unit) as tolerated to prevent decline in function  Nsg notified  Goals   Patient Goals none stated   STG Expiration Date 11/15/22   PT Treatment Day 16   Plan   Treatment/Interventions Functional transfer training;LE strengthening/ROM; Elevations; Therapeutic exercise; Endurance training;Patient/family training;Bed mobility;Gait training;Spoke to nursing;OT   Progress Progressing toward goals   PT Frequency 3-5x/wk   Recommendation   PT Discharge Recommendation Post acute rehabilitation services  (STR beneficial at this point; pt may return home w/ HHPT if 24/7 assistance is available as pt unsafe home alone)   Equipment Recommended [facility] Recommended Wheeled walker   Additional Comments R MAFO issued today   AM-PAC Basic Mobility Inpatient   Turning in Bed Without Bedrails 3   Lying on Back to Sitting on Edge of Flat Bed 3   Moving Bed to Chair 3 Standing Up From Chair 3   Walk in Room 3   Climb 3-5 Stairs 2   Basic Mobility Inpatient Raw Score 17   Basic Mobility Standardized Score 39 67   Highest Level Of Mobility   -HLM Goal 5: Stand one or more mins   -HL Achieved 8: Walk 250 feet ot more   Education   Education Provided Mobility training;Assistive device   Patient Reinforcement needed   End of Consult   Patient Position at End of Consult Supine;Bed/Chair alarm activated; All needs within reach   End of Consult Comments Pt in stable condition  All needs in reach  Bed alarm activated     Abel Strickland

## 2022-11-02 NOTE — ASSESSMENT & PLAN NOTE
· Felt to be multifactorial due to acute kidney injury, on top morphine and gabapentin use, infection  · Her MRI was noted to have severe periventricular and white matter hyperintensities which will need 3 month follow-up  · Overall improving  · Switch to virtual one-to-one today  · Ongoing DC planning with

## 2022-11-02 NOTE — PROGRESS NOTES
[facility]  Progress Note - Belkis Harrell 1970, 45 y o  female MRN: [id number]  Unit/Bed#: Sur[facility] Calder[facility] -01 Encounter: [id number]  Primary Care Provider: Ni Yanes MD   Date and time admitted to hospital: 9/19/2022  8:04 AM    * Toxic metabolic encephalopathy  Assessment & Plan  · Felt to be multifactorial due to acute kidney injury, on top morphine and gabapentin use, infection  · Her MRI was noted to have severe periventricular and white matter hyperintensities which will need 3 month follow-up  · Overall improving  · Switch to virtual one-to-one today  · Ongoing DC planning with     Ambulatory dysfunction  Assessment & Plan  Ongoing DC planning together with case management and   At this time we are working on eventually getting her home with [facility] care and outpatient or home therapy  Not ruling out the possibility of inpatient with good [facility]    Tobacco abuse  Assessment & Plan  · Nicotine patch ordered    Depression  Assessment & Plan  Stable  Continue Effexor 187 5 mg daily and Seroquel 50 mg b i d     VTE Pharmacologic Prophylaxis:   Pharmacologic: Heparin  Mechanical VTE Prophylaxis in Place: No    Patient Centered Rounds: I have performed bedside rounds with nursing staff today  Discussions with Specialists or Other Care Team Provider:  Case management    Education and Discussions with Family / Patient:  Spoke with  Ricki    Time Spent for Care: 20 minutes  More than 50% of total time spent on counseling and coordination of care as described above  Current Length of Stay: 44 day(s)    Current Patient Status: Inpatient   Certification Statement: The patient will continue to require additional inpatient hospital stay due to DC plan    Discharge Plan: To be determined    Code Status: Level 1 - Full Code      Subjective:   Seen and examined during rounds  No events overnight  Has not had therapy yet today    Denies pain or discomfort    Objective:     Vitals:   Temp (24hrs), Av 9 °F (36 6 °C), Min:97 8 °F (36 6 °C), Max:98 °F (36 7 °C)    Temp:  [97 8 °F (36 6 °C)-98 °F (36 7 °C)] 97 9 °F (36 6 °C)  HR:  [] 94  Resp:  [15-18] 18  BP: (116-121)/(76-78) 121/78  SpO2:  [93 %-99 %] 99 %  Body mass index is 18 4 kg/m²  Input and Output Summary (last 24 hours):     No intake or output data in the 24 hours ending 22 1157    Physical Exam:     Physical Exam  Constitutional:       Appearance: She is not ill-appearing or diaphoretic  HENT:      Head: Normocephalic and atraumatic  Nose: No congestion or rhinorrhea  Eyes:      General: No scleral icterus  Cardiovascular:      Rate and Rhythm: Normal rate and regular rhythm  Heart sounds: No murmur heard  Pulmonary:      Effort: Pulmonary effort is normal  No respiratory distress  Breath sounds: No wheezing or rales  Abdominal:      General: Abdomen is flat  Palpations: Abdomen is soft  Musculoskeletal:      Cervical back: Neck supple  Right lower leg: No edema  Left lower leg: No edema  Skin:     General: Skin is warm and dry  Neurological:      Mental Status: She is alert  Comments: Awake alert follows commands  Fluent speech  No hallucination or agitation   Psychiatric:         Mood and Affect: Mood normal          Behavior: Behavior normal        * I Have Reviewed All Lab Data Listed Above    * Additional Pertinent Lab Tests Reviewed: No New Labs Available For Today    Imaging:    Imaging Reports Reviewed Today Include:  None    Recent Cultures (last 7 days):           Last 24 Hours Medication List:   Current Facility-Administered Medications   Medication Dose Route Frequency Provider Last Rate   • acetaminophen  650 mg Oral Q6H PRN Kusum Graham DO     • ALPRAZolam  0 5 mg Oral HS PRN Jordyn Cardoso MD     • amLODIPine  10 mg Oral Daily Trice Diaz MD     • gabapentin  300 mg Oral 4x Daily Brett White DO • heparin (porcine)  5,000 Units Subcutaneous [facility] Atul Adan, DO     • levalbuterol  1 25 mg Nebulization Q4H PRN [facility], DO     • loperamide  2 mg Oral 4x Daily PRN Luz Michel MD     • metoprolol tartrate  25 mg Oral Q12H [facility]der Galdamez MD     • VANDANA ANTIFUNGAL   Topical BID 1024 [facility], DO     • nicotine  1 patch Transdermal Daily Atul Adan, DO     • OLANZapine  5 mg Oral Q6H PRN [address] [facility], DO     • ondansetron  4 mg Intravenous Q4H PRN Luz Maria Ospina DO     • psyllium  1 packet Oral Daily Massiel Pozo DO     • QUEtiapine  50 mg Oral BID Luz Michel MD     • saccharomyces boulardii  250 mg Oral BID Luz Michel MD     • traMADol  50 mg Oral BID PRN CARLITOS Stanley     • venlafaxine  187 5 mg Oral Daily Crystal Santiago DO          Today, Patient Was Seen By: Janice Dotson    ** Please Note: Dictation voice to text software may have been used in the creation of this document   **

## 2022-11-03 RX ADMIN — Medication 1 PATCH: at 09:48

## 2022-11-03 RX ADMIN — TRAMADOL HYDROCHLORIDE 50 MG: 50 TABLET, COATED ORAL at 09:48

## 2022-11-03 RX ADMIN — HEPARIN SODIUM 5000 UNITS: 5000 INJECTION INTRAVENOUS; SUBCUTANEOUS at 13:40

## 2022-11-03 RX ADMIN — TRAMADOL HYDROCHLORIDE 50 MG: 50 TABLET, COATED ORAL at 21:10

## 2022-11-03 RX ADMIN — HEPARIN SODIUM 5000 UNITS: 5000 INJECTION INTRAVENOUS; SUBCUTANEOUS at 05:53

## 2022-11-03 RX ADMIN — Medication 250 MG: at 17:16

## 2022-11-03 RX ADMIN — GABAPENTIN 300 MG: 300 CAPSULE ORAL at 12:37

## 2022-11-03 RX ADMIN — QUETIAPINE FUMARATE 50 MG: 25 TABLET ORAL at 09:46

## 2022-11-03 RX ADMIN — GABAPENTIN 300 MG: 300 CAPSULE ORAL at 09:46

## 2022-11-03 RX ADMIN — Medication 250 MG: at 09:46

## 2022-11-03 RX ADMIN — TRAMADOL HYDROCHLORIDE 50 MG: 50 TABLET, COATED ORAL at 03:30

## 2022-11-03 RX ADMIN — QUETIAPINE FUMARATE 50 MG: 25 TABLET ORAL at 17:16

## 2022-11-03 RX ADMIN — GABAPENTIN 300 MG: 300 CAPSULE ORAL at 17:16

## 2022-11-03 RX ADMIN — VENLAFAXINE HYDROCHLORIDE 187.5 MG: 37.5 CAPSULE, EXTENDED RELEASE ORAL at 09:46

## 2022-11-03 RX ADMIN — METOPROLOL TARTRATE 25 MG: 25 TABLET, FILM COATED ORAL at 09:47

## 2022-11-03 RX ADMIN — HEPARIN SODIUM 5000 UNITS: 5000 INJECTION INTRAVENOUS; SUBCUTANEOUS at 21:12

## 2022-11-03 RX ADMIN — GABAPENTIN 300 MG: 300 CAPSULE ORAL at 21:10

## 2022-11-03 RX ADMIN — AMLODIPINE BESYLATE 10 MG: 10 TABLET ORAL at 09:47

## 2022-11-03 NOTE — PROGRESS NOTES
[facility]  Progress Note - Anton Pickup 1970, 45 y o  female MRN: [id number]  Unit/Bed#: Antoninau Kaitlynn -01 Encounter: [id number]  Primary Care Provider: Melo Lester MD   Date and time admitted to hospital: 2022  8:04 AM    * Toxic metabolic encephalopathy  Assessment & Plan  · Felt to be multifactorial due to acute kidney injury, on top morphine and gabapentin use, infection  · Her MRI was noted to have severe periventricular and white matter hyperintensities which will need 3 month follow-up  · Overall improving  · Overall doing well with virtual one-to-one observation and low bed    Ambulatory dysfunction  Assessment & Plan  Ongoing DC planning together with case management and   Current options are 247 care at home versus inpatient treatment in [facility]      Tobacco abuse  Assessment & Plan  · Continue Nicotine patch    Depression  Assessment & Plan  Stable  Continue Effexor 187 5 mg daily and Seroquel 50 mg b i d       VTE Pharmacologic Prophylaxis:   Pharmacologic: Heparin  Mechanical VTE Prophylaxis in Place: No    Patient Centered Rounds: I have performed bedside rounds with nursing staff today  Discussions with Specialists or Other Care Team Provider:  Case management    Time Spent for Care: 20 minutes  More than 50% of total time spent on counseling and coordination of care as described above      Current Length of Stay: 45 day(s)    Current Patient Status: Inpatient   Certification Statement: The patient will continue to require additional inpatient hospital stay due to Placement    Discharge Plan:  Home with 247 care versus [facility]    Code Status: Level 1 - Full Code      Subjective:   Seen and examined  Poor recall of recent events  No events overnight per nurse  Transition from direct one-to-one observation to virtual one-to-one    Objective:     Vitals:   Temp (24hrs), Av 2 °F (36 8 °C), Min:97 9 °F (36 6 °C), Max:98 4 °F (36 9 °C)    Temp:  [97 9 °F (36 6 °C)-98 4 °F (36 9 °C)] 98 4 °F (36 9 °C)  HR:  [91-97] 91  Resp:  [15-18] 15  BP: (111-121)/(68-78) 114/68  SpO2:  [95 %-99 %] 99 %  Body mass index is 18 4 kg/m²  Input and Output Summary (last 24 hours):     No intake or output data in the 24 hours ending 11/03/22 1826    Physical Exam:     Physical Exam  Vitals reviewed  Constitutional:       Appearance: She is not ill-appearing or diaphoretic  HENT:      Head: Normocephalic and atraumatic  Nose: No congestion or rhinorrhea  Eyes:      General: No scleral icterus  Cardiovascular:      Rate and Rhythm: Normal rate and regular rhythm  Pulmonary:      Effort: Pulmonary effort is normal  No respiratory distress  Breath sounds: No wheezing or rales  Abdominal:      General: Abdomen is flat  Palpations: Abdomen is soft  Musculoskeletal:      Cervical back: Neck supple  Right lower leg: No edema  Left lower leg: No edema  Skin:     General: Skin is warm and dry  Neurological:      Comments: Awake alert follows commands    Psychiatric:         Behavior: Behavior normal        * I Have Reviewed All Lab Data Listed Above    * Additional Pertinent Lab Tests Reviewed: No New Labs Available For Today    Imaging:    Imaging Reports Reviewed Today Include:  None      Recent Cultures (last 7 days):           Last 24 Hours Medication List:   Current Facility-Administered Medications   Medication Dose Route Frequency Provider Last Rate   • acetaminophen  650 mg Oral Q6H PRN Deni Christianson DO     • ALPRAZolam  0 5 mg Oral HS PRN Andrés Wolf MD     • amLODIPine  10 mg Oral Daily Dena Oliveros MD     • gabapentin  300 mg Oral 4x Daily Rasheeda Herron DO     • heparin (porcine)  5,000 Units Subcutaneous Q8H [address] Atul Adan DO     • levalbuterol  1 25 mg Nebulization Q4H PRN Lucille Silva DO     • loperamide  2 mg Oral 4x Daily PRN Andrés Wolf MD     • metoprolol tartrate  25 mg Oral Q12H [address] Kel[address] Mojica MD     • VANDANA ANTIFUNGAL   Topical BID Riaz Chu, DO     • nicotine  1 patch Transdermal Daily Atul Adan, DO     • OLANZapine  5 mg Oral Q6H PRN Brandon Gomez DO     • ondansetron  4 mg Intravenous Q4H PRN Marisa Briggs, DO     • psyllium  1 packet Oral Daily Beatrice Hauser, DO     • QUEtiapine  50 mg Oral BID Eliud Nixon MD     • saccharomyces boulardii  250 mg Oral BID Eliud Nixon MD     • traMADol  50 mg Oral BID PRN CARLITOS Mcdonnell     • venlafaxine  187 5 mg Oral Daily Mohit Hernandez DO          Today, Patient Was Seen By: Martina Coronel    ** Please Note: Dictation voice to text software may have been used in the creation of this document   **

## 2022-11-03 NOTE — CASE MANAGEMENT
Case Management Discharge Planning Note    Patient name Nury Eric  Location [place] [address] 2 /[address] [facility]* MRN [id number]  : 1970 Date 11/3/2022       Current Admission Date: 2022  Current Admission Diagnosis:Toxic metabolic encephalopathy   Patient Active Problem List    Diagnosis Date Noted   • Ambulatory dysfunction 2022   • Moderate protein-calorie malnutrition ([address] ) 10/25/2022   • Mild protein-calorie malnutrition ([address] ) 10/09/2022   • Diarrhea 10/03/2022   • Hypokalemia 10/02/2022   • Hypernatremia 10/02/2022   • Aspiration pneumonitis ([address] ) 10/01/2022   • Pulmonary edema 2022   • Bacteremia due to methicillin susceptible Staphylococcus aureus (MSSA) 2022   • Localized swelling of right upper extremity 2022   • Leg edema, right 2022   • D-dimer, elevated 2022   • ARF (acute renal failure) ([address] ) 2022   • Transaminitis 2022   • Toxic metabolic encephalopathy    • Abnormal CT of the chest 2022   • Traumatic rhabdomyolysis ([facility] ) 2022   • Hyperglycemia 2022   • Opioid dependence ([facility] ) 2021   • Chronic right shoulder pain 2021   • Internal hemorrhoids 2020   • Adnexal cyst 2020   • Ischemic colitis ([facility] ) 2020   • Intercostal neuralgia    • Hematochezia 2019   • Insomnia 2019   • History of rib fracture 10/28/2018   • Tobacco abuse 10/28/2018   • Right knee pain 2018   • Generalized anxiety disorder 2018   • Hypertension    • Hyperlipidemia    • Depression    • COPD (chronic obstructive pulmonary disease) ([facility] )    • Chondromalacia patellae 2018   • Irritable bowel syndrome with diarrhea 2018   • DDD (degenerative disc disease), lumbosacral 2014      LOS (days): 45  Geometric Mean LOS (GMLOS) (days):   Days to GMLOS:     OBJECTIVE:  Risk of Unplanned Readmission Score: 28 21         Current admission status: Inpatient   Preferred Pharmacy:   [facility] [address]   [place] [address]  Phone: [phone] Fax: [phone] - [address], [address] [place] [address]  Phone: [phone] Fax: [phone]    Primary Care Provider: Satinder Francisco MD    Primary Insurance: BLUE CROSS  Secondary Insurance:     DISCHARGE DETAILS:    Additional Comments: CM spoke with pt's  who reports he has not yet been able to secure 24/7 care in the home for pt  Pt was transitioned to virtual observation yesterday, [place] [address] reports pt would need to be on virtual for 72 hours with no restraints or falls in order for them to accept her   reports he will still work on arranging 24/7 care in the home but does want pt to go to [place] [address] if it's an option   department to follow

## 2022-11-03 NOTE — ASSESSMENT & PLAN NOTE
Ongoing DC planning together with case management and     Current options are 247 care at home versus inpatient treatment in [facility]

## 2022-11-03 NOTE — ASSESSMENT & PLAN NOTE
· Felt to be multifactorial due to acute kidney injury, on top morphine and gabapentin use, infection  · Her MRI was noted to have severe periventricular and white matter hyperintensities which will need 3 month follow-up  · Overall improving  · Overall doing well with virtual one-to-one observation and low bed

## 2022-11-03 NOTE — PLAN OF CARE
Problem: PAIN - ADULT  Goal: Verbalizes/displays adequate comfort level or baseline comfort level  Description: Interventions:  - Encourage patient to monitor pain and request assistance  - Assess pain using appropriate pain scale  - Administer analgesics based on type and severity of pain and evaluate response  - Implement non-pharmacological measures as appropriate and evaluate response  - Consider cultural and social influences on pain and pain management  - Notify physician/advanced practitioner if interventions unsuccessful or patient reports new pain  Outcome: Progressing     Problem: INFECTION - ADULT  Goal: Absence or prevention of progression during hospitalization  Description: INTERVENTIONS:  - Assess and monitor for signs and symptoms of infection  - Monitor lab/diagnostic results  - Monitor all insertion sites, i e  indwelling lines, tubes, and drains  - Monitor endotracheal if appropriate and nasal secretions for changes in amount and color  - [place] appropriate cooling/warming therapies per order  - Administer medications as ordered  - Instruct and encourage patient and family to use good hand hygiene technique  - Identify and instruct in appropriate isolation precautions for identified infection/condition  Outcome: Progressing     Problem: SAFETY ADULT  Goal: Patient will remain free of falls  Description: INTERVENTIONS:  - Educate patient/family on patient safety including physical limitations  - Instruct patient to call for assistance with activity   - Consult OT/PT to assist with strengthening/mobility   - Keep Call bell within reach  - Keep bed low and locked with side rails adjusted as appropriate  - Keep care items and personal belongings within reach  - Initiate and maintain comfort rounds  - Make Fall Risk Sign visible to staff  - Offer Toileting every 2 Hours, in advance of need  - Initiate/Maintain bed alarm  - Obtain necessary fall risk management equipment: walker  - Apply yellow socks and bracelet for high fall risk patients  - Consider moving patient to room near nurses station  Outcome: Progressing  Goal: Maintain or return to baseline ADL function  Description: INTERVENTIONS:  -  Assess patient's ability to carry out ADLs; assess patient's baseline for ADL function and identify physical deficits which impact ability to perform ADLs (bathing, care of mouth/teeth, toileting, grooming, dressing, etc )  - Assess/evaluate cause of self-care deficits   - Assess range of motion  - Assess patient's mobility; develop plan if impaired  - Assess patient's need for assistive devices and provide as appropriate  - Encourage maximum independence but intervene and supervise when necessary  - Involve family in performance of ADLs  - Assess for home care needs following discharge   - Consider OT consult to assist with ADL evaluation and planning for discharge  - Provide patient education as appropriate  Outcome: Progressing  Goal: Maintains/Returns to pre admission functional level  Description: INTERVENTIONS:  - Perform BMAT or MOVE assessment daily    - Set and communicate daily mobility goal to care team and patient/family/caregiver  - Collaborate with rehabilitation services on mobility goals if consulted  - Perform Range of Motion 4-6 times a day  - Reposition patient every 2 hours    - Dangle patient 3-4 times a day  - Stand patient 3 times a day  - Ambulate patient 3-4 times a day  - Out of bed for toileting  - Record patient progress and toleration of activity level   Outcome: Progressing     Problem: DISCHARGE PLANNING  Goal: Discharge to home or other facility with appropriate resources  Description: INTERVENTIONS:  - Identify barriers to discharge w/patient and caregiver  - Arrange for needed discharge resources and transportation as appropriate  - Identify discharge learning needs (meds, wound care, etc )  - Arrange for interpretive services to assist at discharge as needed  - Refer to Case Management Department for coordinating discharge planning if the patient needs post-hospital services based on physician/advanced practitioner order or complex needs related to functional status, cognitive ability, or social support system  Outcome: Progressing     Problem: Knowledge Deficit  Goal: Patient/family/caregiver demonstrates understanding of disease process, treatment plan, medications, and discharge instructions  Description: Complete learning assessment and assess knowledge base    Interventions:  - Provide teaching at level of understanding  - Provide teaching via preferred learning methods  Outcome: Progressing     Problem: Potential for Falls  Goal: Patient will remain free of falls  Description: INTERVENTIONS:  - Educate patient/family on patient safety including physical limitations  - Instruct patient to call for assistance with activity   - Consult OT/PT to assist with strengthening/mobility   - Keep Call bell within reach  - Keep bed low and locked with side rails adjusted as appropriate  - Keep care items and personal belongings within reach  - Initiate and maintain comfort rounds  - Make Fall Risk Sign visible to staff  - Offer Toileting every 2 Hours, in advance of need  - Initiate/Maintain bed alarm  - Obtain necessary fall risk management equipment: walker  - Apply yellow socks and bracelet for high fall risk patients  - Consider moving patient to room near nurses station  Outcome: Progressing     Problem: MOBILITY - ADULT  Goal: Maintain or return to baseline ADL function  Description: INTERVENTIONS:  -  Assess patient's ability to carry out ADLs; assess patient's baseline for ADL function and identify physical deficits which impact ability to perform ADLs (bathing, care of mouth/teeth, toileting, grooming, dressing, etc )  - Assess/evaluate cause of self-care deficits   - Assess range of motion  - Assess patient's mobility; develop plan if impaired  - Assess patient's need for assistive devices and provide as appropriate  - Encourage maximum independence but intervene and supervise when necessary  - Involve family in performance of ADLs  - Assess for home care needs following discharge   - Consider OT consult to assist with ADL evaluation and planning for discharge  - Provide patient education as appropriate  Outcome: Progressing  Goal: Maintains/Returns to pre admission functional level  Description: INTERVENTIONS:  - Perform BMAT or MOVE assessment daily    - Set and communicate daily mobility goal to care team and patient/family/caregiver  - Collaborate with rehabilitation services on mobility goals if consulted  - Perform Range of Motion 4-6 times a day  - Reposition patient every 2 hours  - Dangle patient 3-4 times a day  - Stand patient 3 times a day  - Ambulate patient 3-4 times a day  - Out of bed for toileting  - Record patient progress and toleration of activity level   Outcome: Progressing     Problem: Prexisting or High Potential for Compromised Skin Integrity  Goal: Skin integrity is maintained or improved  Description: INTERVENTIONS:  - Identify patients at risk for skin breakdown  - Assess and monitor skin integrity  - Assess and monitor nutrition and hydration status  - Monitor labs   - Assess for incontinence   - Turn and reposition patient  - Assist with mobility/ambulation  - Relieve pressure over bony prominences  - Avoid friction and shearing  - Provide appropriate hygiene as needed including keeping skin clean and dry  - Evaluate need for skin moisturizer/barrier cream  - Collaborate with interdisciplinary team   - Patient/family teaching  - Consider wound care consult   Outcome: Progressing     Problem: Nutrition/Hydration-ADULT  Goal: Nutrient/Hydration intake appropriate for improving, restoring or maintaining nutritional needs  Description: Monitor and assess patient's nutrition/hydration status for malnutrition   Collaborate with interdisciplinary team and initiate plan and interventions as ordered  Monitor patient's weight and dietary intake as ordered or per policy  Utilize nutrition screening tool and intervene as necessary  Determine patient's food preferences and provide high-protein, high-caloric foods as appropriate  INTERVENTIONS:  - Monitor oral intake, urinary output, labs, and treatment plans  - Assess nutrition and hydration status and recommend course of action  - Evaluate amount of meals eaten  - Assist patient with eating if necessary   - Allow adequate time for meals  - Recommend/ encourage appropriate diets, oral nutritional supplements, and vitamin/mineral supplements  - Order, calculate, and assess calorie counts as needed  - Recommend, monitor, and adjust tube feedings and TPN/PPN based on assessed needs  - Assess need for intravenous fluids  - Provide specific nutrition/hydration education as appropriate  - Include patient/family/caregiver in decisions related to nutrition  Outcome: Progressing     Problem: NEUROSENSORY - ADULT  Goal: Achieves maximal functionality and self care  Description: INTERVENTIONS  - Monitor swallowing and airway patency with patient fatigue and changes in neurological status  - Encourage and assist patient to increase activity and self care     - Encourage visually impaired, hearing impaired and aphasic patients to use assistive/communication devices  Outcome: Progressing  Goal: Achieves stable or improved neurological status  Description: INTERVENTIONS  - Monitor and report changes in neurological status  - Monitor vital signs such as temperature, blood pressure, glucose, and any other labs ordered   - Initiate measures to prevent increased intracranial pressure  - Monitor for seizure activity and implement precautions if appropriate      Outcome: Progressing     Problem: CARDIOVASCULAR - ADULT  Goal: Maintains optimal cardiac output and hemodynamic stability  Description: INTERVENTIONS:  - Monitor I/O, vital signs and rhythm  - Monitor for S/S and trends of decreased cardiac output  - Administer and titrate ordered vasoactive medications to optimize hemodynamic stability  - Assess quality of pulses, skin color and temperature  - Assess for signs of decreased coronary artery perfusion  - Instruct patient to report change in severity of symptoms  Outcome: Progressing     Problem: RESPIRATORY - ADULT  Goal: Achieves optimal ventilation and oxygenation  Description: INTERVENTIONS:  - Assess for changes in respiratory status  - Assess for changes in mentation and behavior  - Position to facilitate oxygenation and minimize respiratory effort  - Oxygen administered by appropriate delivery if ordered  - Initiate smoking cessation education as indicated  - Encourage broncho-pulmonary hygiene including cough, deep breathe, Incentive Spirometry  - Assess the need for suctioning and aspirate as needed  - Assess and instruct to report SOB or any respiratory difficulty  - Respiratory Therapy support as indicated  Outcome: Progressing     Problem: GASTROINTESTINAL - ADULT  Goal: Maintains adequate nutritional intake  Description: INTERVENTIONS:  - Monitor percentage of each meal consumed  - Identify factors contributing to decreased intake, treat as appropriate  - Assist with meals as needed  - Monitor I&O, weight, and lab values if indicated  - Obtain nutrition services referral as needed  Outcome: Progressing     Problem: METABOLIC, FLUID AND ELECTROLYTES - ADULT  Goal: Electrolytes maintained within normal limits  Description: INTERVENTIONS:  - Monitor labs and assess patient for signs and symptoms of electrolyte imbalances  - Administer electrolyte replacement as ordered  - Monitor response to electrolyte replacements, including repeat lab results as appropriate  - Instruct patient on fluid and nutrition as appropriate  Outcome: Progressing  Goal: Fluid balance maintained  Description: INTERVENTIONS:  - Monitor labs   - Monitor I/O and WT  - Instruct patient on fluid and nutrition as appropriate  - Assess for signs & symptoms of volume excess or deficit  Outcome: Progressing     Problem: SKIN/TISSUE INTEGRITY - ADULT  Goal: Skin Integrity remains intact(Skin Breakdown Prevention)  Description: Assess:  -Perform [place] assessment every shift  -Clean and moisturize skin every day  -Inspect skin when repositioning, toileting, and assisting with ADLS  -Assess extremities for adequate circulation and sensation     Bed Management:  -Have minimal linens on bed & keep smooth, unwrinkled  -Change linens as needed when moist or perspiring  -Avoid sitting or lying in one position for more than 2 hours while in bed  -Keep HOB at 45 degrees     Toileting:  -Offer bedside commode  -Assess for incontinence every 2 hours  -Use incontinent care products after each incontinent episode such as brief    Activity:  -Mobilize patient 3-4 times a day  -Encourage activity and walks on unit  -Encourage or provide ROM exercises   -Turn and reposition patient every 2 Hours  -Use appropriate equipment to lift or move patient in bed  -Instruct/ Assist with weight shifting every 2 when out of bed in chair  -Consider limitation of chair time 2 hour intervals    Skin Care:  -Avoid use of baby powder, tape, friction and shearing, hot water or constrictive clothing  -Relieve pressure over bony prominences using pillows  -Do not massage red bony areas    Next Steps:  -Teach patient strategies to minimize risks such as walker  -Consider consults to  interdisciplinary teams such as wound  Outcome: Progressing  Goal: Incision(s), wounds(s) or drain site(s) healing without S/S of infection  Description: INTERVENTIONS  - Assess and document dressing, incision, wound bed, drain sites and surrounding tissue  - Provide patient and family education  - Perform skin care/dressing changes every PRN  Outcome: Progressing     Problem: MUSCULOSKELETAL - ADULT  Goal: Maintain or return mobility to safest level of function  Description: INTERVENTIONS:  - Assess patient's ability to carry out ADLs; assess patient's baseline for ADL function and identify physical deficits which impact ability to perform ADLs (bathing, care of mouth/teeth, toileting, grooming, dressing, etc )  - Assess/evaluate cause of self-care deficits   - Assess range of motion  - Assess patient's mobility  - Assess patient's need for assistive devices and provide as appropriate  - Encourage maximum independence but intervene and supervise when necessary  - Involve family in performance of ADLs  - Assess for home care needs following discharge   - Consider OT consult to assist with ADL evaluation and planning for discharge  - Provide patient education as appropriate  Outcome: Progressing

## 2022-11-03 NOTE — PLAN OF CARE
Problem: PAIN - ADULT  Goal: Verbalizes/displays adequate comfort level or baseline comfort level  Description: Interventions:  - Encourage patient to monitor pain and request assistance  - Assess pain using appropriate pain scale  - Administer analgesics based on type and severity of pain and evaluate response  - Implement non-pharmacological measures as appropriate and evaluate response  - Consider cultural and social influences on pain and pain management  - Notify physician/advanced practitioner if interventions unsuccessful or patient reports new pain  Outcome: Progressing     Problem: INFECTION - ADULT  Goal: Absence or prevention of progression during hospitalization  Description: INTERVENTIONS:  - Assess and monitor for signs and symptoms of infection  - Monitor lab/diagnostic results  - Monitor all insertion sites, i e  indwelling lines, tubes, and drains  - Monitor endotracheal if appropriate and nasal secretions for changes in amount and color  - [place] appropriate cooling/warming therapies per order  - Administer medications as ordered  - Instruct and encourage patient and family to use good hand hygiene technique  - Identify and instruct in appropriate isolation precautions for identified infection/condition  Outcome: Progressing     Problem: SAFETY ADULT  Goal: Patient will remain free of falls  Description: INTERVENTIONS:  - Educate patient/family on patient safety including physical limitations  - Instruct patient to call for assistance with activity   - Consult OT/PT to assist with strengthening/mobility   - Keep Call bell within reach  - Keep bed low and locked with side rails adjusted as appropriate  - Keep care items and personal belongings within reach  - Initiate and maintain comfort rounds  - Make Fall Risk Sign visible to staff  - Offer Toileting every 2 Hours, in advance of need  - Initiate/Maintain bed alarm  - Obtain necessary fall risk management equipment: walker  - Apply yellow socks and bracelet for high fall risk patients  - Consider moving patient to room near nurses station  Outcome: Progressing  Goal: Maintain or return to baseline ADL function  Description: INTERVENTIONS:  -  Assess patient's ability to carry out ADLs; assess patient's baseline for ADL function and identify physical deficits which impact ability to perform ADLs (bathing, care of mouth/teeth, toileting, grooming, dressing, etc )  - Assess/evaluate cause of self-care deficits   - Assess range of motion  - Assess patient's mobility; develop plan if impaired  - Assess patient's need for assistive devices and provide as appropriate  - Encourage maximum independence but intervene and supervise when necessary  - Involve family in performance of ADLs  - Assess for home care needs following discharge   - Consider OT consult to assist with ADL evaluation and planning for discharge  - Provide patient education as appropriate  Outcome: Progressing  Goal: Maintains/Returns to pre admission functional level  Description: INTERVENTIONS:  - Perform BMAT or MOVE assessment daily    - Set and communicate daily mobility goal to care team and patient/family/caregiver  - Collaborate with rehabilitation services on mobility goals if consulted  - Perform Range of Motion 4-6 times a day  - Reposition patient every 2 hours    - Dangle patient 3-4 times a day  - Stand patient 3 times a day  - Ambulate patient 3-4 times a day  - Out of bed for toileting  - Record patient progress and toleration of activity level   Outcome: Progressing     Problem: DISCHARGE PLANNING  Goal: Discharge to home or other facility with appropriate resources  Description: INTERVENTIONS:  - Identify barriers to discharge w/patient and caregiver  - Arrange for needed discharge resources and transportation as appropriate  - Identify discharge learning needs (meds, wound care, etc )  - Arrange for interpretive services to assist at discharge as needed  - Refer to Case Management Department for coordinating discharge planning if the patient needs post-hospital services based on physician/advanced practitioner order or complex needs related to functional status, cognitive ability, or social support system  Outcome: Progressing     Problem: Knowledge Deficit  Goal: Patient/family/caregiver demonstrates understanding of disease process, treatment plan, medications, and discharge instructions  Description: Complete learning assessment and assess knowledge base    Interventions:  - Provide teaching at level of understanding  - Provide teaching via preferred learning methods  Outcome: Progressing     Problem: Potential for Falls  Goal: Patient will remain free of falls  Description: INTERVENTIONS:  - Educate patient/family on patient safety including physical limitations  - Instruct patient to call for assistance with activity   - Consult OT/PT to assist with strengthening/mobility   - Keep Call bell within reach  - Keep bed low and locked with side rails adjusted as appropriate  - Keep care items and personal belongings within reach  - Initiate and maintain comfort rounds  - Make Fall Risk Sign visible to staff  - Offer Toileting every 2 Hours, in advance of need  - Initiate/Maintain bed alarm  - Obtain necessary fall risk management equipment: walker  - Apply yellow socks and bracelet for high fall risk patients  - Consider moving patient to room near nurses station  Outcome: Progressing     Problem: MOBILITY - ADULT  Goal: Maintain or return to baseline ADL function  Description: INTERVENTIONS:  -  Assess patient's ability to carry out ADLs; assess patient's baseline for ADL function and identify physical deficits which impact ability to perform ADLs (bathing, care of mouth/teeth, toileting, grooming, dressing, etc )  - Assess/evaluate cause of self-care deficits   - Assess range of motion  - Assess patient's mobility; develop plan if impaired  - Assess patient's need for assistive devices and provide as appropriate  - Encourage maximum independence but intervene and supervise when necessary  - Involve family in performance of ADLs  - Assess for home care needs following discharge   - Consider OT consult to assist with ADL evaluation and planning for discharge  - Provide patient education as appropriate  Outcome: Progressing  Goal: Maintains/Returns to pre admission functional level  Description: INTERVENTIONS:  - Perform BMAT or MOVE assessment daily    - Set and communicate daily mobility goal to care team and patient/family/caregiver  - Collaborate with rehabilitation services on mobility goals if consulted  - Perform Range of Motion 4-6 times a day  - Reposition patient every 2 hours  - Dangle patient 3-4 times a day  - Stand patient 3 times a day  - Ambulate patient 3-4 times a day  - Out of bed for toileting  - Record patient progress and toleration of activity level   Outcome: Progressing     Problem: Prexisting or High Potential for Compromised Skin Integrity  Goal: Skin integrity is maintained or improved  Description: INTERVENTIONS:  - Identify patients at risk for skin breakdown  - Assess and monitor skin integrity  - Assess and monitor nutrition and hydration status  - Monitor labs   - Assess for incontinence   - Turn and reposition patient  - Assist with mobility/ambulation  - Relieve pressure over bony prominences  - Avoid friction and shearing  - Provide appropriate hygiene as needed including keeping skin clean and dry  - Evaluate need for skin moisturizer/barrier cream  - Collaborate with interdisciplinary team   - Patient/family teaching  - Consider wound care consult   Outcome: Progressing     Problem: Nutrition/Hydration-ADULT  Goal: Nutrient/Hydration intake appropriate for improving, restoring or maintaining nutritional needs  Description: Monitor and assess patient's nutrition/hydration status for malnutrition   Collaborate with interdisciplinary team and initiate plan and interventions as ordered  Monitor patient's weight and dietary intake as ordered or per policy  Utilize nutrition screening tool and intervene as necessary  Determine patient's food preferences and provide high-protein, high-caloric foods as appropriate  INTERVENTIONS:  - Monitor oral intake, urinary output, labs, and treatment plans  - Assess nutrition and hydration status and recommend course of action  - Evaluate amount of meals eaten  - Assist patient with eating if necessary   - Allow adequate time for meals  - Recommend/ encourage appropriate diets, oral nutritional supplements, and vitamin/mineral supplements  - Order, calculate, and assess calorie counts as needed  - Recommend, monitor, and adjust tube feedings and TPN/PPN based on assessed needs  - Assess need for intravenous fluids  - Provide specific nutrition/hydration education as appropriate  - Include patient/family/caregiver in decisions related to nutrition  Outcome: Progressing     Problem: NEUROSENSORY - ADULT  Goal: Achieves maximal functionality and self care  Description: INTERVENTIONS  - Monitor swallowing and airway patency with patient fatigue and changes in neurological status  - Encourage and assist patient to increase activity and self care     - Encourage visually impaired, hearing impaired and aphasic patients to use assistive/communication devices  Outcome: Progressing  Goal: Achieves stable or improved neurological status  Description: INTERVENTIONS  - Monitor and report changes in neurological status  - Monitor vital signs such as temperature, blood pressure, glucose, and any other labs ordered   - Initiate measures to prevent increased intracranial pressure  - Monitor for seizure activity and implement precautions if appropriate      Outcome: Progressing     Problem: CARDIOVASCULAR - ADULT  Goal: Maintains optimal cardiac output and hemodynamic stability  Description: INTERVENTIONS:  - Monitor I/O, vital signs and rhythm  - Monitor for S/S and trends of decreased cardiac output  - Administer and titrate ordered vasoactive medications to optimize hemodynamic stability  - Assess quality of pulses, skin color and temperature  - Assess for signs of decreased coronary artery perfusion  - Instruct patient to report change in severity of symptoms  Outcome: Progressing     Problem: RESPIRATORY - ADULT  Goal: Achieves optimal ventilation and oxygenation  Description: INTERVENTIONS:  - Assess for changes in respiratory status  - Assess for changes in mentation and behavior  - Position to facilitate oxygenation and minimize respiratory effort  - Oxygen administered by appropriate delivery if ordered  - Initiate smoking cessation education as indicated  - Encourage broncho-pulmonary hygiene including cough, deep breathe, Incentive Spirometry  - Assess the need for suctioning and aspirate as needed  - Assess and instruct to report SOB or any respiratory difficulty  - Respiratory Therapy support as indicated  Outcome: Progressing     Problem: GASTROINTESTINAL - ADULT  Goal: Maintains adequate nutritional intake  Description: INTERVENTIONS:  - Monitor percentage of each meal consumed  - Identify factors contributing to decreased intake, treat as appropriate  - Assist with meals as needed  - Monitor I&O, weight, and lab values if indicated  - Obtain nutrition services referral as needed  Outcome: Progressing     Problem: METABOLIC, FLUID AND ELECTROLYTES - ADULT  Goal: Electrolytes maintained within normal limits  Description: INTERVENTIONS:  - Monitor labs and assess patient for signs and symptoms of electrolyte imbalances  - Administer electrolyte replacement as ordered  - Monitor response to electrolyte replacements, including repeat lab results as appropriate  - Instruct patient on fluid and nutrition as appropriate  Outcome: Progressing  Goal: Fluid balance maintained  Description: INTERVENTIONS:  - Monitor labs   - Monitor I/O and WT  - Instruct patient on fluid and nutrition as appropriate  - Assess for signs & symptoms of volume excess or deficit  Outcome: Progressing     Problem: SKIN/TISSUE INTEGRITY - ADULT  Goal: Skin Integrity remains intact(Skin Breakdown Prevention)  Description: Assess:  -Perform [place] assessment every shift  -Clean and moisturize skin every day  -Inspect skin when repositioning, toileting, and assisting with ADLS  -Assess extremities for adequate circulation and sensation     Bed Management:  -Have minimal linens on bed & keep smooth, unwrinkled  -Change linens as needed when moist or perspiring  -Avoid sitting or lying in one position for more than 2 hours while in bed  -Keep HOB at 45 degrees     Toileting:  -Offer bedside commode  -Assess for incontinence every 2 hours  -Use incontinent care products after each incontinent episode such as brief    Activity:  -Mobilize patient 3-4 times a day  -Encourage activity and walks on unit  -Encourage or provide ROM exercises   -Turn and reposition patient every 2 Hours  -Use appropriate equipment to lift or move patient in bed  -Instruct/ Assist with weight shifting every 2 when out of bed in chair  -Consider limitation of chair time 2 hour intervals    Skin Care:  -Avoid use of baby powder, tape, friction and shearing, hot water or constrictive clothing  -Relieve pressure over bony prominences using pillows  -Do not massage red bony areas    Next Steps:  -Teach patient strategies to minimize risks such as walker  -Consider consults to  interdisciplinary teams such as wound  Outcome: Progressing  Goal: Incision(s), wounds(s) or drain site(s) healing without S/S of infection  Description: INTERVENTIONS  - Assess and document dressing, incision, wound bed, drain sites and surrounding tissue  - Provide patient and family education  - Perform skin care/dressing changes every PRN  Outcome: Progressing     Problem: MUSCULOSKELETAL - ADULT  Goal: Maintain or return mobility to safest level of function  Description: INTERVENTIONS:  - Assess patient's ability to carry out ADLs; assess patient's baseline for ADL function and identify physical deficits which impact ability to perform ADLs (bathing, care of mouth/teeth, toileting, grooming, dressing, etc )  - Assess/evaluate cause of self-care deficits   - Assess range of motion  - Assess patient's mobility  - Assess patient's need for assistive devices and provide as appropriate  - Encourage maximum independence but intervene and supervise when necessary  - Involve family in performance of ADLs  - Assess for home care needs following discharge   - Consider OT consult to assist with ADL evaluation and planning for discharge  - Provide patient education as appropriate  Outcome: Progressing

## 2022-11-04 RX ADMIN — Medication 1 PATCH: at 08:47

## 2022-11-04 RX ADMIN — GABAPENTIN 300 MG: 300 CAPSULE ORAL at 11:55

## 2022-11-04 RX ADMIN — QUETIAPINE FUMARATE 50 MG: 25 TABLET ORAL at 17:39

## 2022-11-04 RX ADMIN — GABAPENTIN 300 MG: 300 CAPSULE ORAL at 17:40

## 2022-11-04 RX ADMIN — HEPARIN SODIUM 5000 UNITS: 5000 INJECTION INTRAVENOUS; SUBCUTANEOUS at 14:43

## 2022-11-04 RX ADMIN — METOPROLOL TARTRATE 25 MG: 25 TABLET, FILM COATED ORAL at 08:45

## 2022-11-04 RX ADMIN — Medication 250 MG: at 17:40

## 2022-11-04 RX ADMIN — QUETIAPINE FUMARATE 50 MG: 25 TABLET ORAL at 08:46

## 2022-11-04 RX ADMIN — TRAMADOL HYDROCHLORIDE 50 MG: 50 TABLET, COATED ORAL at 08:45

## 2022-11-04 RX ADMIN — HEPARIN SODIUM 5000 UNITS: 5000 INJECTION INTRAVENOUS; SUBCUTANEOUS at 06:39

## 2022-11-04 RX ADMIN — HEPARIN SODIUM 5000 UNITS: 5000 INJECTION INTRAVENOUS; SUBCUTANEOUS at 20:53

## 2022-11-04 RX ADMIN — GABAPENTIN 300 MG: 300 CAPSULE ORAL at 20:53

## 2022-11-04 RX ADMIN — ACETAMINOPHEN 650 MG: 325 TABLET, FILM COATED ORAL at 17:42

## 2022-11-04 RX ADMIN — Medication 250 MG: at 08:45

## 2022-11-04 RX ADMIN — VENLAFAXINE HYDROCHLORIDE 187.5 MG: 37.5 CAPSULE, EXTENDED RELEASE ORAL at 09:37

## 2022-11-04 RX ADMIN — GABAPENTIN 300 MG: 300 CAPSULE ORAL at 08:45

## 2022-11-04 RX ADMIN — AMLODIPINE BESYLATE 10 MG: 10 TABLET ORAL at 08:45

## 2022-11-04 RX ADMIN — TRAMADOL HYDROCHLORIDE 50 MG: 50 TABLET, COATED ORAL at 20:53

## 2022-11-04 NOTE — PLAN OF CARE
Problem: OCCUPATIONAL THERAPY ADULT  Goal: Performs self-care activities at highest level of function for planned discharge setting  See evaluation for individualized goals  Description: Treatment Interventions: ADL retraining, Functional transfer training, Endurance training, Cognitive reorientation, Patient/family training, Compensatory technique education, Continued evaluation          See flowsheet documentation for full assessment, interventions and recommendations  Note: Limitation: Decreased ADL status, Decreased Safe judgement during ADL, Decreased cognition, Decreased endurance, Decreased high-level ADLs     Assessment: Pt seen for 25min tx session with focus on functional balance, functional mobility, ADL status, transfer safety, activity tolerance, and cognition  Pt able to tolerate OOB mobility; sitting balance=g-/f+, standing balance=f/f-  Pt required verbal/physical cues to maintain transfer safety  Pt able to demonstrate good ADL status  Cognitive deficits continued to be observed--orientation, memory, problem-solving  Pt continues to demonstrate appropriateness for inpt rehab to improve her overall level of independence  Will continue       OT Discharge Recommendation: Post acute rehabilitation services

## 2022-11-04 NOTE — PLAN OF CARE
Problem: PAIN - ADULT  Goal: Verbalizes/displays adequate comfort level or baseline comfort level  Description: Interventions:  - Encourage patient to monitor pain and request assistance  - Assess pain using appropriate pain scale  - Administer analgesics based on type and severity of pain and evaluate response  - Implement non-pharmacological measures as appropriate and evaluate response  - Consider cultural and social influences on pain and pain management  - Notify physician/advanced practitioner if interventions unsuccessful or patient reports new pain  Outcome: Progressing     Problem: INFECTION - ADULT  Goal: Absence or prevention of progression during hospitalization  Description: INTERVENTIONS:  - Assess and monitor for signs and symptoms of infection  - Monitor lab/diagnostic results  - Monitor all insertion sites, i e  indwelling lines, tubes, and drains  - Monitor endotracheal if appropriate and nasal secretions for changes in amount and color  - Kelly appropriate cooling/warming therapies per order  - Administer medications as ordered  - Instruct and encourage patient and family to use good hand hygiene technique  - Identify and instruct in appropriate isolation precautions for identified infection/condition  Outcome: Progressing     Problem: SAFETY ADULT  Goal: Patient will remain free of falls  Description: INTERVENTIONS:  - Educate patient/family on patient safety including physical limitations  - Instruct patient to call for assistance with activity   - Consult OT/PT to assist with strengthening/mobility   - Keep Call bell within reach  - Keep bed low and locked with side rails adjusted as appropriate  - Keep care items and personal belongings within reach  - Initiate and maintain comfort rounds  - Make Fall Risk Sign visible to staff  - Offer Toileting every 2 Hours, in advance of need  - Initiate/Maintain bed alarm  - Obtain necessary fall risk management equipment: walker  - Apply yellow socks and bracelet for high fall risk patients  - Consider moving patient to room near nurses station  Outcome: Progressing  Goal: Maintain or return to baseline ADL function  Description: INTERVENTIONS:  -  Assess patient's ability to carry out ADLs; assess patient's baseline for ADL function and identify physical deficits which impact ability to perform ADLs (bathing, care of mouth/teeth, toileting, grooming, dressing, etc )  - Assess/evaluate cause of self-care deficits   - Assess range of motion  - Assess patient's mobility; develop plan if impaired  - Assess patient's need for assistive devices and provide as appropriate  - Encourage maximum independence but intervene and supervise when necessary  - Involve family in performance of ADLs  - Assess for home care needs following discharge   - Consider OT consult to assist with ADL evaluation and planning for discharge  - Provide patient education as appropriate  Outcome: Progressing  Goal: Maintains/Returns to pre admission functional level  Description: INTERVENTIONS:  - Perform BMAT or MOVE assessment daily    - Set and communicate daily mobility goal to care team and patient/family/caregiver  - Collaborate with rehabilitation services on mobility goals if consulted  - Perform Range of Motion 4-6 times a day  - Reposition patient every 2 hours    - Dangle patient 3-4 times a day  - Stand patient 3 times a day  - Ambulate patient 3-4 times a day  - Out of bed for toileting  - Record patient progress and toleration of activity level   Outcome: Progressing     Problem: DISCHARGE PLANNING  Goal: Discharge to home or other facility with appropriate resources  Description: INTERVENTIONS:  - Identify barriers to discharge w/patient and caregiver  - Arrange for needed discharge resources and transportation as appropriate  - Identify discharge learning needs (meds, wound care, etc )  - Arrange for interpretive services to assist at discharge as needed  - Refer to Case Management Department for coordinating discharge planning if the patient needs post-hospital services based on physician/advanced practitioner order or complex needs related to functional status, cognitive ability, or social support system  Outcome: Progressing     Problem: Knowledge Deficit  Goal: Patient/family/caregiver demonstrates understanding of disease process, treatment plan, medications, and discharge instructions  Description: Complete learning assessment and assess knowledge base    Interventions:  - Provide teaching at level of understanding  - Provide teaching via preferred learning methods  Outcome: Progressing     Problem: Potential for Falls  Goal: Patient will remain free of falls  Description: INTERVENTIONS:  - Educate patient/family on patient safety including physical limitations  - Instruct patient to call for assistance with activity   - Consult OT/PT to assist with strengthening/mobility   - Keep Call bell within reach  - Keep bed low and locked with side rails adjusted as appropriate  - Keep care items and personal belongings within reach  - Initiate and maintain comfort rounds  - Make Fall Risk Sign visible to staff  - Offer Toileting every 2 Hours, in advance of need  - Initiate/Maintain bed alarm  - Obtain necessary fall risk management equipment: walker  - Apply yellow socks and bracelet for high fall risk patients  - Consider moving patient to room near nurses station  Outcome: Progressing     Problem: MOBILITY - ADULT  Goal: Maintain or return to baseline ADL function  Description: INTERVENTIONS:  -  Assess patient's ability to carry out ADLs; assess patient's baseline for ADL function and identify physical deficits which impact ability to perform ADLs (bathing, care of mouth/teeth, toileting, grooming, dressing, etc )  - Assess/evaluate cause of self-care deficits   - Assess range of motion  - Assess patient's mobility; develop plan if impaired  - Assess patient's need for assistive devices and provide as appropriate  - Encourage maximum independence but intervene and supervise when necessary  - Involve family in performance of ADLs  - Assess for home care needs following discharge   - Consider OT consult to assist with ADL evaluation and planning for discharge  - Provide patient education as appropriate  Outcome: Progressing  Goal: Maintains/Returns to pre admission functional level  Description: INTERVENTIONS:  - Perform BMAT or MOVE assessment daily    - Set and communicate daily mobility goal to care team and patient/family/caregiver  - Collaborate with rehabilitation services on mobility goals if consulted  - Perform Range of Motion 4-6 times a day  - Reposition patient every 2 hours  - Dangle patient 3-4 times a day  - Stand patient 3 times a day  - Ambulate patient 3-4 times a day  - Out of bed for toileting  - Record patient progress and toleration of activity level   Outcome: Progressing     Problem: Prexisting or High Potential for Compromised Skin Integrity  Goal: Skin integrity is maintained or improved  Description: INTERVENTIONS:  - Identify patients at risk for skin breakdown  - Assess and monitor skin integrity  - Assess and monitor nutrition and hydration status  - Monitor labs   - Assess for incontinence   - Turn and reposition patient  - Assist with mobility/ambulation  - Relieve pressure over bony prominences  - Avoid friction and shearing  - Provide appropriate hygiene as needed including keeping skin clean and dry  - Evaluate need for skin moisturizer/barrier cream  - Collaborate with interdisciplinary team   - Patient/family teaching  - Consider wound care consult   Outcome: Progressing     Problem: Nutrition/Hydration-ADULT  Goal: Nutrient/Hydration intake appropriate for improving, restoring or maintaining nutritional needs  Description: Monitor and assess patient's nutrition/hydration status for malnutrition   Collaborate with interdisciplinary team and initiate plan and interventions as ordered  Monitor patient's weight and dietary intake as ordered or per policy  Utilize nutrition screening tool and intervene as necessary  Determine patient's food preferences and provide high-protein, high-caloric foods as appropriate  INTERVENTIONS:  - Monitor oral intake, urinary output, labs, and treatment plans  - Assess nutrition and hydration status and recommend course of action  - Evaluate amount of meals eaten  - Assist patient with eating if necessary   - Allow adequate time for meals  - Recommend/ encourage appropriate diets, oral nutritional supplements, and vitamin/mineral supplements  - Order, calculate, and assess calorie counts as needed  - Recommend, monitor, and adjust tube feedings and TPN/PPN based on assessed needs  - Assess need for intravenous fluids  - Provide specific nutrition/hydration education as appropriate  - Include patient/family/caregiver in decisions related to nutrition  Outcome: Progressing     Problem: NEUROSENSORY - ADULT  Goal: Achieves maximal functionality and self care  Description: INTERVENTIONS  - Monitor swallowing and airway patency with patient fatigue and changes in neurological status  - Encourage and assist patient to increase activity and self care     - Encourage visually impaired, hearing impaired and aphasic patients to use assistive/communication devices  Outcome: Progressing  Goal: Achieves stable or improved neurological status  Description: INTERVENTIONS  - Monitor and report changes in neurological status  - Monitor vital signs such as temperature, blood pressure, glucose, and any other labs ordered   - Initiate measures to prevent increased intracranial pressure  - Monitor for seizure activity and implement precautions if appropriate      Outcome: Progressing     Problem: CARDIOVASCULAR - ADULT  Goal: Maintains optimal cardiac output and hemodynamic stability  Description: INTERVENTIONS:  - Monitor I/O, vital signs and rhythm  - Monitor for S/S and trends of decreased cardiac output  - Administer and titrate ordered vasoactive medications to optimize hemodynamic stability  - Assess quality of pulses, skin color and temperature  - Assess for signs of decreased coronary artery perfusion  - Instruct patient to report change in severity of symptoms  Outcome: Progressing     Problem: RESPIRATORY - ADULT  Goal: Achieves optimal ventilation and oxygenation  Description: INTERVENTIONS:  - Assess for changes in respiratory status  - Assess for changes in mentation and behavior  - Position to facilitate oxygenation and minimize respiratory effort  - Oxygen administered by appropriate delivery if ordered  - Initiate smoking cessation education as indicated  - Encourage broncho-pulmonary hygiene including cough, deep breathe, Incentive Spirometry  - Assess the need for suctioning and aspirate as needed  - Assess and instruct to report SOB or any respiratory difficulty  - Respiratory Therapy support as indicated  Outcome: Progressing     Problem: GASTROINTESTINAL - ADULT  Goal: Maintains adequate nutritional intake  Description: INTERVENTIONS:  - Monitor percentage of each meal consumed  - Identify factors contributing to decreased intake, treat as appropriate  - Assist with meals as needed  - Monitor I&O, weight, and lab values if indicated  - Obtain nutrition services referral as needed  Outcome: Progressing     Problem: METABOLIC, FLUID AND ELECTROLYTES - ADULT  Goal: Electrolytes maintained within normal limits  Description: INTERVENTIONS:  - Monitor labs and assess patient for signs and symptoms of electrolyte imbalances  - Administer electrolyte replacement as ordered  - Monitor response to electrolyte replacements, including repeat lab results as appropriate  - Instruct patient on fluid and nutrition as appropriate  Outcome: Progressing  Goal: Fluid balance maintained  Description: INTERVENTIONS:  - Monitor labs   - Monitor I/O and WT  - Instruct patient on fluid and nutrition as appropriate  - Assess for signs & symptoms of volume excess or deficit  Outcome: Progressing     Problem: SKIN/TISSUE INTEGRITY - ADULT  Goal: Skin Integrity remains intact(Skin Breakdown Prevention)  Description: Assess:  -Perform Erickson assessment every shift  -Clean and moisturize skin every day  -Inspect skin when repositioning, toileting, and assisting with ADLS  -Assess extremities for adequate circulation and sensation     Bed Management:  -Have minimal linens on bed & keep smooth, unwrinkled  -Change linens as needed when moist or perspiring  -Avoid sitting or lying in one position for more than 2 hours while in bed  -Keep HOB at 45 degrees     Toileting:  -Offer bedside commode  -Assess for incontinence every 2 hours  -Use incontinent care products after each incontinent episode such as brief    Activity:  -Mobilize patient 3-4 times a day  -Encourage activity and walks on unit  -Encourage or provide ROM exercises   -Turn and reposition patient every 2 Hours  -Use appropriate equipment to lift or move patient in bed  -Instruct/ Assist with weight shifting every 2 when out of bed in chair  -Consider limitation of chair time 2 hour intervals    Skin Care:  -Avoid use of baby powder, tape, friction and shearing, hot water or constrictive clothing  -Relieve pressure over bony prominences using pillows  -Do not massage red bony areas    Next Steps:  -Teach patient strategies to minimize risks such as walker  -Consider consults to  interdisciplinary teams such as wound  Outcome: Progressing  Goal: Incision(s), wounds(s) or drain site(s) healing without S/S of infection  Description: INTERVENTIONS  - Assess and document dressing, incision, wound bed, drain sites and surrounding tissue  - Provide patient and family education  - Perform skin care/dressing changes every PRN  Outcome: Progressing     Problem: MUSCULOSKELETAL - ADULT  Goal: Maintain or return mobility to safest level of function  Description: INTERVENTIONS:  - Assess patient's ability to carry out ADLs; assess patient's baseline for ADL function and identify physical deficits which impact ability to perform ADLs (bathing, care of mouth/teeth, toileting, grooming, dressing, etc )  - Assess/evaluate cause of self-care deficits   - Assess range of motion  - Assess patient's mobility  - Assess patient's need for assistive devices and provide as appropriate  - Encourage maximum independence but intervene and supervise when necessary  - Involve family in performance of ADLs  - Assess for home care needs following discharge   - Consider OT consult to assist with ADL evaluation and planning for discharge  - Provide patient education as appropriate  Outcome: Progressing

## 2022-11-04 NOTE — ASSESSMENT & PLAN NOTE
· Felt to be multifactorial due to acute kidney injury, on top morphine and gabapentin use, infection  · Her MRI was noted to have severe periventricular and white matter hyperintensities which will need 3 month follow-up  · Overall improving  · Overall doing well with virtual one-to-one observation and low bed  · [facility] looking at possibly [facility] her next week

## 2022-11-04 NOTE — CASE MANAGEMENT
Case Management Discharge Planning Note    Patient name Belkis Harrell  Location [place] [address] 2 /[address] Irene Olivera* MRN [id number]  : 1970 Date 2022       Current Admission Date: 2022  Current Admission Diagnosis:Toxic metabolic encephalopathy   Patient Active Problem List    Diagnosis Date Noted   • Ambulatory dysfunction 2022   • Moderate protein-calorie malnutrition ([address] ) 10/25/2022   • Mild protein-calorie malnutrition ([address] ) 10/09/2022   • Diarrhea 10/03/2022   • Hypokalemia 10/02/2022   • Hypernatremia 10/02/2022   • Aspiration pneumonitis ([facility] ) 10/01/2022   • Pulmonary edema 2022   • Bacteremia due to methicillin susceptible Staphylococcus aureus (MSSA) 2022   • Localized swelling of right upper extremity 2022   • Leg edema, right 2022   • D-dimer, elevated 2022   • ARF (acute renal failure) ([facility] ) 2022   • Transaminitis 2022   • Toxic metabolic encephalopathy    • Abnormal CT of the chest 2022   • Traumatic rhabdomyolysis ([facility] ) 2022   • Hyperglycemia 2022   • Opioid dependence ([facility] ) 2021   • Chronic right shoulder pain 2021   • Internal hemorrhoids 2020   • Adnexal cyst 2020   • Ischemic colitis ([facility] ) 2020   • Intercostal neuralgia    • Hematochezia 2019   • Insomnia 2019   • History of rib fracture 10/28/2018   • Tobacco abuse 10/28/2018   • Right knee pain 2018   • Generalized anxiety disorder 2018   • Hypertension    • Hyperlipidemia    • Depression    • COPD (chronic obstructive pulmonary disease) ([facility] )    • Chondromalacia patellae 2018   • Irritable bowel syndrome with diarrhea 2018   • DDD (degenerative disc disease), lumbosacral 2014      LOS (days): 46  Geometric Mean LOS (GMLOS) (days):   Days to GMLOS:     OBJECTIVE:  Risk of Unplanned Readmission Score: 28 21         Current admission status: Inpatient   Preferred Pharmacy:   [facility] [id number]   [place] [address]  Phone: [phone] Fax: [phone] [place] [address]  Phone: [phone] Fax: [phone]    Primary Care Provider: Shanika Fisher MD    Primary Insurance: BLUE CROSS  Secondary Insurance:     DISCHARGE DETAILS:     Additional Comments: Per RN, pt doing well 48 hours into virtual observation with no need for restraints and no falls  CM informed Yen Le of same via Aidin communication and inquired about whether they are able to evaluate pt today with hopes that she will do well for another 24 hours on virtual or if they would need to wait until 72 hours to review  CM department to follow

## 2022-11-04 NOTE — PROGRESS NOTES
[facility]  Progress Note - Vianey June 1970, 45 y o  female MRN: [id number]  Unit/Bed#: [place] [address] -01 Encounter: [id number]  Primary Care Provider: Dorothea Alexander MD   Date and time admitted to hospital: 9/19/2022  8:04 AM    * Toxic metabolic encephalopathy  Assessment & Plan  · Felt to be multifactorial due to acute kidney injury, on top morphine and gabapentin use, infection  · Her MRI was noted to have severe periventricular and white matter hyperintensities which will need 3 month follow-up  · Overall improving  · Overall doing well with virtual one-to-one observation and low bed  · [facility] looking at possibly accepting her next week    Ambulatory dysfunction  Assessment & Plan  Ongoing DC planning together with case management and   Doing well on virtual observation  Possible DC to [facility] next week    Tobacco abuse  Assessment & Plan  · Continue Nicotine patch    Depression  Assessment & Plan  Stable  Continue Effexor 187 5 mg daily and Seroquel 50 mg b i d       VTE Pharmacologic Prophylaxis:   Pharmacologic: Enoxaparin (Lovenox)  Mechanical VTE Prophylaxis in Place: Yes    Patient Centered Rounds: I have performed bedside rounds with nursing staff today  Discussions with Specialists or Other Care Team Provider:  None    Education and Discussions with Family / Patient: spoke with Ricki and gave update    Time Spent for Care: 20 minutes  More than 50% of total time spent on counseling and coordination of care as described above  Current Length of Stay: 46 day(s)    Current Patient Status: Inpatient   Certification Statement: The patient will continue to require additional inpatient hospital stay due to ambulatory dysfunction    Discharge Plan: To be determined    Code Status: Level 1 - Full Code      Subjective:   Seen and examined during rounds  No events overnight  Reportedly doing well while on virtual observation only      Objective: Vitals:   Temp (24hrs), Av 1 °F (36 7 °C), Min:97 8 °F (36 6 °C), Max:98 5 °F (36 9 °C)    Temp:  [97 8 °F (36 6 °C)-98 5 °F (36 9 °C)] 98 5 °F (36 9 °C)  HR:  [] 94  Resp:  [14-20] 20  BP: ()/() 105/68  SpO2:  [95 %-99 %] 95 %  Body mass index is 18 4 kg/m²  Input and Output Summary (last 24 hours):     No intake or output data in the 24 hours ending 22 1744    Physical Exam:     Physical Exam  Vitals reviewed  HENT:      Head: Normocephalic and atraumatic  Nose: No congestion or rhinorrhea  Eyes:      General: No scleral icterus  Cardiovascular:      Rate and Rhythm: Normal rate and regular rhythm  Pulmonary:      Effort: Pulmonary effort is normal  No respiratory distress  Breath sounds: No wheezing  Abdominal:      General: Abdomen is flat  There is no distension  Palpations: Abdomen is soft  There is no mass  Tenderness: There is no abdominal tenderness  Musculoskeletal:      Cervical back: Neck supple  Right lower leg: No edema  Left lower leg: No edema  Skin:     General: Skin is warm and dry  Neurological:      Mental Status: She is alert  Comments: Awake alert follows commands   Psychiatric:         Mood and Affect: Mood normal          Behavior: Behavior normal      * I Have Reviewed All Lab Data Listed Above  * Additional Pertinent Lab Tests Reviewed:  All Labs Within Last 24 Hours Reviewed    Imaging:    Imaging Reports Reviewed Today Include:  None  Imaging Personally Reviewed by Myself Includes:  None    Recent Cultures (last 7 days):           Last 24 Hours Medication List:   Current Facility-Administered Medications   Medication Dose Route Frequency Provider Last Rate   • acetaminophen  650 mg Oral Q6H PRN Duey Riding, DO     • ALPRAZolam  0 5 mg Oral HS PRN Carmela Stoner MD     • amLODIPine  10 mg Oral Daily Sam Richardson MD     • gabapentin  300 mg Oral 4x Daily Kerry Cyr DO     • heparin (porcine)  5,000 Units Subcutaneous [facility] Atul Adan, DO     • levalbuterol  1 25 mg Nebulization Q4H PRN Víctor Santiago, DO     • loperamide  2 mg Oral 4x Daily PRN Lindsay Hastings MD     • metoprolol tartrate  25 mg Oral Q12H [address] Inder Canas MD     • VANDANA ANTIFUNGAL   Topical BID Víctor Santiago, DO     • nicotine  1 patch Transdermal Daily Atul Adan, DO     • OLANZapine  5 mg Oral Q6H PRN Víctor Santiago, DO     • ondansetron  4 mg Intravenous Q4H PRN Yoshi Ramos DO     • psyllium  1 packet Oral Daily Hong Cast DO     • QUEtiapine  50 mg Oral BID Lindsay Hastings MD     • saccharomyces boulardii  250 mg Oral BID Lindsay Hastings MD     • traMADol  50 mg Oral BID PRN CARLITOS Price     • venlafaxine  187 5 mg Oral Daily Yury Burns DO          Today, Patient Was Seen By: Collette Yee    ** Please Note: Dictation voice to text software may have been used in the creation of this document   **

## 2022-11-04 NOTE — CASE MANAGEMENT
Case Management Discharge Planning Note    Patient name Mary Overton  Location [facility] 2 /South 2 Sher[facility] Distance* MRN [id number]  : 1970 Date 2022       Current Admission Date: 2022  Current Admission Diagnosis:Toxic metabolic encephalopathy   Patient Active Problem List    Diagnosis Date Noted   • Ambulatory dysfunction 2022   • Moderate protein-calorie malnutrition ([address] ) 10/25/2022   • Mild protein-calorie malnutrition ([address] ) 10/09/2022   • Diarrhea 10/03/2022   • Hypokalemia 10/02/2022   • Hypernatremia 10/02/2022   • Aspiration pneumonitis ([address] ) 10/01/2022   • Pulmonary edema 2022   • Bacteremia due to methicillin susceptible Staphylococcus aureus (MSSA) 2022   • Localized swelling of right upper extremity 2022   • Leg edema, right 2022   • D-dimer, elevated 2022   • ARF (acute renal failure) ([address] ) 2022   • Transaminitis 2022   • Toxic metabolic encephalopathy    • Abnormal CT of the chest 2022   • Traumatic rhabdomyolysis ([facility] ) 2022   • Hyperglycemia 2022   • Opioid dependence ([facility] ) 2021   • Chronic right shoulder pain 2021   • Internal hemorrhoids 2020   • Adnexal cyst 2020   • Ischemic colitis ([facility] ) 2020   • Intercostal neuralgia    • Hematochezia 2019   • Insomnia 2019   • History of rib fracture 10/28/2018   • Tobacco abuse 10/28/2018   • Right knee pain 2018   • Generalized anxiety disorder 2018   • Hypertension    • Hyperlipidemia    • Depression    • COPD (chronic obstructive pulmonary disease) ([facility] )    • Chondromalacia patellae 2018   • Irritable bowel syndrome with diarrhea 2018   • DDD (degenerative disc disease), lumbosacral 2014      LOS (days): 46  Geometric Mean LOS (GMLOS) (days):   Days to GMLOS:     OBJECTIVE:  Risk of Unplanned Readmission Score: 28 21         Current admission status: Inpatient   Preferred Pharmacy:   [phone]   [place] [address]  Phone: [phone] Fax: [phone] [place] [address]  Phone: [phone] Fax: [phone]    Primary Care Provider: Satinder Francisco MD    Primary Insurance: BLUE CROSS  Secondary Insurance:     DISCHARGE DETAILS:     Additional Comments: Per [employer] communication, [facility] would be able to begin insurance authorization on Monday if pt does well on virtual observation over the weekend  CM sent TT to therapy requesting pt be put on the PT/OT schedule on Sunday in preparation for insurance auth submission on Monday  CM spoke with pt's  who is aware of same  CM department to follow

## 2022-11-04 NOTE — OCCUPATIONAL THERAPY NOTE
Occupational Therapy Progress Note([id number]=[id number])     Patient Name: Nelson Suárez  Today's Date: 11/4/2022  Problem List  Principal Problem:    Toxic metabolic encephalopathy  Active Problems:    Depression    Tobacco abuse    DDD (degenerative disc disease), lumbosacral    ARF (acute renal failure) (HCC)    Transaminitis    Abnormal CT of the chest    Traumatic rhabdomyolysis (HCC)    D-dimer, elevated    Leg edema, right    Localized swelling of right upper extremity    Bacteremia due to methicillin susceptible Staphylococcus aureus (MSSA)    Pulmonary edema    Aspiration pneumonitis (HCC)    Hypokalemia    Hypernatremia    Diarrhea    Mild protein-calorie malnutrition (HCC)    Moderate protein-calorie malnutrition (HCC)    Ambulatory dysfunction            11/04/22 0840   Note Type   Note Type Treatment   Pain Assessment   Pain Assessment Tool FLACC   Pain Location/Orientation Orientation: Right;[place] Foot   Pain Rating: FLACC (Rest) - Face 0   Pain Rating: FLACC (Rest) - Legs 0   Pain Rating: FLACC (Rest) - Activity 0   Pain Rating: FLACC (Rest) - Cry 1   Pain Rating: FLACC (Rest) - Consolability 0   Score: FLACC (Rest) 1   Restrictions/Precautions   Weight Bearing Precautions Per Order No   Other Precautions Cognitive; Chair Alarm; Bed Alarm; Fall Risk;Pain  (vitual 1:1)   ADL   Where Assessed Edge of bed   Eating Assistance 6  Modified independent   Grooming Assistance 6  Modified Independent   UB Bathing Assistance 5  Supervision/Setup   LB Bathing Assistance 5  Supervision/Setup   UB Dressing Assistance 5  Supervision/Setup   LB Dressing Assistance 5  Supervision/Setup   Functional Standing Tolerance   Time 1-2mins   Bed Mobility   Rolling R 6  Modified independent   Rolling L 6  Modified independent   Supine to Sit 6  Modified independent   Sit to Supine 6  Modified independent   Transfers   Sit to Stand 4  Minimal assistance   Additional items Assist x 1; Increased time required;Verbal cues   Stand to Sit 4  Minimal assistance   Additional items Increased time required;Verbal cues   Functional Mobility   Functional Mobility 4  Minimal assistance   Additional Comments x1   Additional items Rolling walker   Subjective   Subjective "I've been here for 45days "   Cognition   Overall Cognitive Status Impaired   Arousal/Participation Alert   Attention Attends with cues to redirect   Orientation Level Oriented to person;Disoriented to place; Disoriented to time;Oriented to situation   Memory Decreased short term memory;Decreased recall of recent events;Decreased recall of precautions   Following Commands Follows one step commands with increased time or repetition   Activity Tolerance   Activity Tolerance Patient limited by fatigue;Patient limited by pain   Medical Staff Made Aware nsg, CM   Assessment   Assessment Pt seen for 25min tx session with focus on functional balance, functional mobility, ADL status, transfer safety, activity tolerance, and cognition  Pt able to tolerate OOB mobility; sitting balance=g-/f+, standing balance=f/f-  Pt required verbal/physical cues to maintain transfer safety  Pt able to demonstrate good ADL status  Cognitive deficits continued to be observed--orientation, memory, problem-solving  Pt continues to demonstrate appropriateness for inpt rehab to improve her overall level of independence  Will continue  Plan   Treatment Interventions ADL retraining;Functional transfer training; Endurance training;Cognitive reorientation;Patient/family training; Compensatory technique education;Continued evaluation   Goal Expiration Date 11/15/22   OT Treatment Day 13   OT Frequency 2-3x/wk   Recommendation   OT Discharge Recommendation Post acute rehabilitation services   AM-PAC Daily Activity Inpatient   Lower Body Dressing 3   Bathing 3   Toileting 3   Upper Body Dressing 3   Grooming 3   Eating 3   Daily Activity Raw Score 18   Daily Activity Standardized Score (Calc for Raw Score >=11) 38 [address] [place] 4   Follow Simple Instructions 3   Low Function Daily Activity Raw Score 25   Low Function Daily Activity Standardized Score 38 15   AM-PAC Applied Cognition Inpatient   Following a Speech/Presentation 3   Understanding Ordinary Conversation 2   Taking Medications 2   Remembering Where Things Are Placed or Put Away 2   Remembering List of 4-5 Errands 2   Taking Care of Complicated Tasks 2   Applied Cognition Raw Score 13   Applied Cognition Standardized Score 30 46   Adriane Shield

## 2022-11-04 NOTE — PLAN OF CARE
Problem: PAIN - ADULT  Goal: Verbalizes/displays adequate comfort level or baseline comfort level  Description: Interventions:  - Encourage patient to monitor pain and request assistance  - Assess pain using appropriate pain scale  - Administer analgesics based on type and severity of pain and evaluate response  - Implement non-pharmacological measures as appropriate and evaluate response  - Consider cultural and social influences on pain and pain management  - Notify physician/advanced practitioner if interventions unsuccessful or patient reports new pain  Outcome: Progressing     Problem: INFECTION - ADULT  Goal: Absence or prevention of progression during hospitalization  Description: INTERVENTIONS:  - Assess and monitor for signs and symptoms of infection  - Monitor lab/diagnostic results  - Monitor all insertion sites, i e  indwelling lines, tubes, and drains  - Monitor endotracheal if appropriate and nasal secretions for changes in amount and color  - [place] appropriate cooling/warming therapies per order  - Administer medications as ordered  - Instruct and encourage patient and family to use good hand hygiene technique  - Identify and instruct in appropriate isolation precautions for identified infection/condition  Outcome: Progressing     Problem: SAFETY ADULT  Goal: Patient will remain free of falls  Description: INTERVENTIONS:  - Educate patient/family on patient safety including physical limitations  - Instruct patient to call for assistance with activity   - Consult OT/PT to assist with strengthening/mobility   - Keep Call bell within reach  - Keep bed low and locked with side rails adjusted as appropriate  - Keep care items and personal belongings within reach  - Initiate and maintain comfort rounds  - Make Fall Risk Sign visible to staff  - Offer Toileting every 2 Hours, in advance of need  - Initiate/Maintain bed alarm  - Obtain necessary fall risk management equipment: walker  - Apply yellow socks and bracelet for high fall risk patients  - Consider moving patient to room near nurses station  Outcome: Progressing  Goal: Maintain or return to baseline ADL function  Description: INTERVENTIONS:  -  Assess patient's ability to carry out ADLs; assess patient's baseline for ADL function and identify physical deficits which impact ability to perform ADLs (bathing, care of mouth/teeth, toileting, grooming, dressing, etc )  - Assess/evaluate cause of self-care deficits   - Assess range of motion  - Assess patient's mobility; develop plan if impaired  - Assess patient's need for assistive devices and provide as appropriate  - Encourage maximum independence but intervene and supervise when necessary  - Involve family in performance of ADLs  - Assess for home care needs following discharge   - Consider OT consult to assist with ADL evaluation and planning for discharge  - Provide patient education as appropriate  Outcome: Progressing  Goal: Maintains/Returns to pre admission functional level  Description: INTERVENTIONS:  - Perform BMAT or MOVE assessment daily    - Set and communicate daily mobility goal to care team and patient/family/caregiver  - Collaborate with rehabilitation services on mobility goals if consulted  - Perform Range of Motion 4-6 times a day  - Reposition patient every 2 hours    - Dangle patient 3-4 times a day  - Stand patient 3 times a day  - Ambulate patient 3-4 times a day  - Out of bed for toileting  - Record patient progress and toleration of activity level   Outcome: Progressing     Problem: DISCHARGE PLANNING  Goal: Discharge to home or other facility with appropriate resources  Description: INTERVENTIONS:  - Identify barriers to discharge w/patient and caregiver  - Arrange for needed discharge resources and transportation as appropriate  - Identify discharge learning needs (meds, wound care, etc )  - Arrange for interpretive services to assist at discharge as needed  - Refer to Case Management Department for coordinating discharge planning if the patient needs post-hospital services based on physician/advanced practitioner order or complex needs related to functional status, cognitive ability, or social support system  Outcome: Progressing     Problem: Knowledge Deficit  Goal: Patient/family/caregiver demonstrates understanding of disease process, treatment plan, medications, and discharge instructions  Description: Complete learning assessment and assess knowledge base    Interventions:  - Provide teaching at level of understanding  - Provide teaching via preferred learning methods  Outcome: Progressing     Problem: Potential for Falls  Goal: Patient will remain free of falls  Description: INTERVENTIONS:  - Educate patient/family on patient safety including physical limitations  - Instruct patient to call for assistance with activity   - Consult OT/PT to assist with strengthening/mobility   - Keep Call bell within reach  - Keep bed low and locked with side rails adjusted as appropriate  - Keep care items and personal belongings within reach  - Initiate and maintain comfort rounds  - Make Fall Risk Sign visible to staff  - Offer Toileting every 2 Hours, in advance of need  - Initiate/Maintain bed alarm  - Obtain necessary fall risk management equipment: walker  - Apply yellow socks and bracelet for high fall risk patients  - Consider moving patient to room near nurses station  Outcome: Progressing     Problem: MOBILITY - ADULT  Goal: Maintain or return to baseline ADL function  Description: INTERVENTIONS:  -  Assess patient's ability to carry out ADLs; assess patient's baseline for ADL function and identify physical deficits which impact ability to perform ADLs (bathing, care of mouth/teeth, toileting, grooming, dressing, etc )  - Assess/evaluate cause of self-care deficits   - Assess range of motion  - Assess patient's mobility; develop plan if impaired  - Assess patient's need for assistive devices and provide as appropriate  - Encourage maximum independence but intervene and supervise when necessary  - Involve family in performance of ADLs  - Assess for home care needs following discharge   - Consider OT consult to assist with ADL evaluation and planning for discharge  - Provide patient education as appropriate  Outcome: Progressing  Goal: Maintains/Returns to pre admission functional level  Description: INTERVENTIONS:  - Perform BMAT or MOVE assessment daily    - Set and communicate daily mobility goal to care team and patient/family/caregiver  - Collaborate with rehabilitation services on mobility goals if consulted  - Perform Range of Motion 4-6 times a day  - Reposition patient every 2 hours  - Dangle patient 3-4 times a day  - Stand patient 3 times a day  - Ambulate patient 3-4 times a day  - Out of bed for toileting  - Record patient progress and toleration of activity level   Outcome: Progressing     Problem: Prexisting or High Potential for Compromised Skin Integrity  Goal: Skin integrity is maintained or improved  Description: INTERVENTIONS:  - Identify patients at risk for skin breakdown  - Assess and monitor skin integrity  - Assess and monitor nutrition and hydration status  - Monitor labs   - Assess for incontinence   - Turn and reposition patient  - Assist with mobility/ambulation  - Relieve pressure over bony prominences  - Avoid friction and shearing  - Provide appropriate hygiene as needed including keeping skin clean and dry  - Evaluate need for skin moisturizer/barrier cream  - Collaborate with interdisciplinary team   - Patient/family teaching  - Consider wound care consult   Outcome: Progressing     Problem: Nutrition/Hydration-ADULT  Goal: Nutrient/Hydration intake appropriate for improving, restoring or maintaining nutritional needs  Description: Monitor and assess patient's nutrition/hydration status for malnutrition   Collaborate with interdisciplinary team and initiate plan and interventions as ordered  Monitor patient's weight and dietary intake as ordered or per policy  Utilize nutrition screening tool and intervene as necessary  Determine patient's food preferences and provide high-protein, high-caloric foods as appropriate  INTERVENTIONS:  - Monitor oral intake, urinary output, labs, and treatment plans  - Assess nutrition and hydration status and recommend course of action  - Evaluate amount of meals eaten  - Assist patient with eating if necessary   - Allow adequate time for meals  - Recommend/ encourage appropriate diets, oral nutritional supplements, and vitamin/mineral supplements  - Order, calculate, and assess calorie counts as needed  - Recommend, monitor, and adjust tube feedings and TPN/PPN based on assessed needs  - Assess need for intravenous fluids  - Provide specific nutrition/hydration education as appropriate  - Include patient/family/caregiver in decisions related to nutrition  Outcome: Progressing     Problem: NEUROSENSORY - ADULT  Goal: Achieves maximal functionality and self care  Description: INTERVENTIONS  - Monitor swallowing and airway patency with patient fatigue and changes in neurological status  - Encourage and assist patient to increase activity and self care     - Encourage visually impaired, hearing impaired and aphasic patients to use assistive/communication devices  Outcome: Progressing  Goal: Achieves stable or improved neurological status  Description: INTERVENTIONS  - Monitor and report changes in neurological status  - Monitor vital signs such as temperature, blood pressure, glucose, and any other labs ordered   - Initiate measures to prevent increased intracranial pressure  - Monitor for seizure activity and implement precautions if appropriate      Outcome: Progressing     Problem: CARDIOVASCULAR - ADULT  Goal: Maintains optimal cardiac output and hemodynamic stability  Description: INTERVENTIONS:  - Monitor I/O, vital signs and rhythm  - Monitor for S/S and trends of decreased cardiac output  - Administer and titrate ordered vasoactive medications to optimize hemodynamic stability  - Assess quality of pulses, skin color and temperature  - Assess for signs of decreased coronary artery perfusion  - Instruct patient to report change in severity of symptoms  Outcome: Progressing     Problem: RESPIRATORY - ADULT  Goal: Achieves optimal ventilation and oxygenation  Description: INTERVENTIONS:  - Assess for changes in respiratory status  - Assess for changes in mentation and behavior  - Position to facilitate oxygenation and minimize respiratory effort  - Oxygen administered by appropriate delivery if ordered  - Initiate smoking cessation education as indicated  - Encourage broncho-pulmonary hygiene including cough, deep breathe, Incentive Spirometry  - Assess the need for suctioning and aspirate as needed  - Assess and instruct to report SOB or any respiratory difficulty  - Respiratory Therapy support as indicated  Outcome: Progressing     Problem: GASTROINTESTINAL - ADULT  Goal: Maintains adequate nutritional intake  Description: INTERVENTIONS:  - Monitor percentage of each meal consumed  - Identify factors contributing to decreased intake, treat as appropriate  - Assist with meals as needed  - Monitor I&O, weight, and lab values if indicated  - Obtain nutrition services referral as needed  Outcome: Progressing     Problem: METABOLIC, FLUID AND ELECTROLYTES - ADULT  Goal: Electrolytes maintained within normal limits  Description: INTERVENTIONS:  - Monitor labs and assess patient for signs and symptoms of electrolyte imbalances  - Administer electrolyte replacement as ordered  - Monitor response to electrolyte replacements, including repeat lab results as appropriate  - Instruct patient on fluid and nutrition as appropriate  Outcome: Progressing  Goal: Fluid balance maintained  Description: INTERVENTIONS:  - Monitor labs   - Monitor I/O and WT  - Instruct patient on fluid and nutrition as appropriate  - Assess for signs & symptoms of volume excess or deficit  Outcome: Progressing     Problem: SKIN/TISSUE INTEGRITY - ADULT  Goal: Skin Integrity remains intact(Skin Breakdown Prevention)  Description: Assess:  -Perform Erickson assessment every shift  -Clean and moisturize skin every day  -Inspect skin when repositioning, toileting, and assisting with ADLS  -Assess extremities for adequate circulation and sensation     Bed Management:  -Have minimal linens on bed & keep smooth, unwrinkled  -Change linens as needed when moist or perspiring  -Avoid sitting or lying in one position for more than 2 hours while in bed  -Keep HOB at 45 degrees     Toileting:  -Offer bedside commode  -Assess for incontinence every 2 hours  -Use incontinent care products after each incontinent episode such as brief    Activity:  -Mobilize patient 3-4 times a day  -Encourage activity and walks on unit  -Encourage or provide ROM exercises   -Turn and reposition patient every 2 Hours  -Use appropriate equipment to lift or move patient in bed  -Instruct/ Assist with weight shifting every 2 when out of bed in chair  -Consider limitation of chair time 2 hour intervals    Skin Care:  -Avoid use of baby powder, tape, friction and shearing, hot water or constrictive clothing  -Relieve pressure over bony prominences using pillows  -Do not massage red bony areas    Next Steps:  -Teach patient strategies to minimize risks such as walker  -Consider consults to  interdisciplinary teams such as wound  Outcome: Progressing  Goal: Incision(s), wounds(s) or drain site(s) healing without S/S of infection  Description: INTERVENTIONS  - Assess and document dressing, incision, wound bed, drain sites and surrounding tissue  - Provide patient and family education  - Perform skin care/dressing changes every PRN  Outcome: Progressing     Problem: MUSCULOSKELETAL - ADULT  Goal: Maintain or return mobility to safest level of function  Description: INTERVENTIONS:  - Assess patient's ability to carry out ADLs; assess patient's baseline for ADL function and identify physical deficits which impact ability to perform ADLs (bathing, care of mouth/teeth, toileting, grooming, dressing, etc )  - Assess/evaluate cause of self-care deficits   - Assess range of motion  - Assess patient's mobility  - Assess patient's need for assistive devices and provide as appropriate  - Encourage maximum independence but intervene and supervise when necessary  - Involve family in performance of ADLs  - Assess for home care needs following discharge   - Consider OT consult to assist with ADL evaluation and planning for discharge  - Provide patient education as appropriate  Outcome: Progressing

## 2022-11-04 NOTE — PHYSICAL THERAPY NOTE
Physical Therapy Treatment Note     11/04/22 0854   PT Last Visit   PT Visit Date 11/04/22   Note Type   Note Type Treatment   Pain Assessment   Pain Assessment Tool FLACC   Pain Score 10 - Worst Possible Pain   Pain Location/Orientation Orientation: Right;[place] Foot   Restrictions/Precautions   Weight Bearing Precautions Per Order No   Other Precautions Cognitive; Bed Alarm;Pain; Fall Risk  (virtual1:1)   General   Chart Reviewed Yes   Family/Caregiver Present No   Subjective   Subjective Pt  Needed much encouragement and education to participate in PT  Patient reported she is frustrated because she feels she has been locked in here  Patient also reported her right f oot hurts really bad   Bed Mobility   Rolling R 6  Modified independent   Rolling L 6  Modified independent   Supine to Sit 6  Modified independent   Additional items Increased time required   Sit to Supine 6  Modified independent   Additional items Increased time required   Transfers   Sit to Stand 4  Minimal assistance   Additional items Assist x 1;Verbal cues; Increased time required   Stand to Sit 4  Minimal assistance   Additional items Increased time required   Stand pivot 4  Minimal assistance   Additional items Increased time required   Ambulation/Elevation   Gait pattern Improper Weight shift; Poor UE support;R Foot drag;Decreased R stance;Decreased foot clearance; Excessively slow; Inconsistent yan;Decreased heel strike   Gait Assistance 4  Minimal assist   Additional items Assist x 1;Verbal cues   Assistive Device Rolling walker   Distance 8ft   Balance   Static Sitting Good   Dynamic Sitting Fair +   Static Standing Fair -   Dynamic Standing Poor +   Ambulatory Poor +   Endurance Deficit   Endurance Deficit Yes   Endurance Deficit Description pain, reluctance due to her situation   Activity Tolerance   Activity Tolerance Patient tolerated treatment well;Patient limited by pain   Nurse Made Aware Yes   Assessment   Prognosis Fair Problem List Decreased strength;Decreased range of motion;Decreased endurance; Impaired balance;Decreased mobility; Decreased cognition;Decreased coordination; Impaired judgement;Pain   Assessment Patient needed much encouragement and education to participate in therapy session however participation noted to be very minimal   Patient reported her right foot is very painful  Noted right foot to be painful to touch  Patient has 2 pairs of shoes in her room however both are ill-fitting with MAFO  Recommended patient to get sneakers if possible after checking with her family  Wrapped ace bandage around the right ankle to address the R foot weakness  Patient reported" i can't get out of here they have locked me in here  I cannot even go to the bathroom  I have been here 44 days  And they have that thing on me - patient finding to virtual 1:1"  Explained to patient regarding safety  However patient reported her right foot is really hurting  She declined to participate any further ambulation after 8 ft with Min A  Patient reported she will work with therapy over the weekend or on Monday  Patient back in bed at the end of session with OT present  Will continue to follow and progress patient as appropriate  Patient reported lot of pain even trying to put just her shoes on right foot without the sock or MAFO   Barriers to Discharge None   Goals   Patient Goals None reported   STG Expiration Date 11/15/22   PT Treatment Day 17   Plan   Treatment/Interventions Functional transfer training;Spoke to nursing;OT;Spoke to case management;Gait training;Bed mobility; Equipment eval/education;Patient/family training   Progress Progressing toward goals   PT Frequency 3-5x/wk   Recommendation   PT Discharge Recommendation Post acute rehabilitation services   Equipment Recommended [facility] Recommended Wheeled walker   AM-PAC Basic Mobility Inpatient   Turning in Bed Without Bedrails 4   Lying on Back to Sitting on Edge of Flat Bed 4   Moving Bed to Chair 3   Standing Up From Chair 3   Walk in Room 3   Climb 3-5 Stairs 3   Basic Mobility Inpatient Raw Score 20   Basic Mobility Standardized Score 43 99   Turning Head Towards Sound 4   Follow Simple Instructions 3   Low Function Basic Mobility Raw Score 27   Low Function Basic Mobility Standardized Score 42 6   Highest Level Of Mobility   JH-HLM Goal 6: Walk 10 steps or more   JH-HLM Achieved 6: Walk 10 steps or more         [facility] basic mobility standardized score less than 42 9 suggest the patient may benefit from discharge to post-acute rehab services

## 2022-11-04 NOTE — ASSESSMENT & PLAN NOTE
Ongoing DC planning together with case management and     Doing well on virtual observation  Possible DC to [facility] next week

## 2022-11-04 NOTE — PLAN OF CARE
Problem: PHYSICAL THERAPY ADULT  Goal: Performs mobility at highest level of function for planned discharge setting  See evaluation for individualized goals  Description: Treatment/Interventions: Functional transfer training, LE strengthening/ROM, Elevations, Therapeutic exercise, Cognitive reorientation, Patient/family training, Equipment eval/education, Bed mobility, Gait training, Compensatory technique education, Continued evaluation, Spoke to nursing, Spoke to case management, Spoke to MD, OT, ST  Equipment Recommended: [employer] (if patient does not own)       See flowsheet documentation for full assessment, interventions and recommendations  Outcome: Progressing  Note: Prognosis: Fair  Problem List: Decreased strength, Decreased range of motion, Decreased endurance, Impaired balance, Decreased mobility, Decreased cognition, Decreased coordination, Impaired judgement, Pain  Assessment: Patient needed much encouragement and education to participate in therapy session however participation noted to be very minimal   Patient reported her right foot is very painful  Noted right foot to be painful to touch  Patient has 2 pairs of shoes in her room however both are ill-fitting with MAFO  Recommended patient to get sneakers if possible after checking with her family  Wrapped ace bandage around the right ankle to address the R foot weakness  Patient reported" i can't get out of here they have locked me in here  I cannot even go to the bathroom  I have been here 44 days  And they have that thing on me - patient finding to virtual 1:1"  Explained to patient regarding safety  However patient reported her right foot is really hurting  She declined to participate any further ambulation after 8 ft with Min A  Patient reported she will work with therapy over the weekend or on Monday  Patient back in bed at the end of session with OT present  Will continue to follow and progress patient as appropriate    Patient reported lot of pain even trying to put just her shoes on right foot without the sock or MAFO  Barriers to Discharge: None  Barriers to Discharge Comments: unable to identify at this time  PT Discharge Recommendation: Post acute rehabilitation services    See flowsheet documentation for full assessment

## 2022-11-05 PROBLEM — M79.671 RIGHT FOOT PAIN: Status: ACTIVE | Noted: 2022-11-05

## 2022-11-05 RX ADMIN — TRAMADOL HYDROCHLORIDE 50 MG: 50 TABLET, COATED ORAL at 17:34

## 2022-11-05 RX ADMIN — ACETAMINOPHEN 650 MG: 325 TABLET, FILM COATED ORAL at 21:32

## 2022-11-05 RX ADMIN — ALPRAZOLAM 0.5 MG: 0.5 TABLET ORAL at 21:32

## 2022-11-05 RX ADMIN — QUETIAPINE FUMARATE 50 MG: 25 TABLET ORAL at 09:08

## 2022-11-05 RX ADMIN — METOPROLOL TARTRATE 25 MG: 25 TABLET, FILM COATED ORAL at 09:09

## 2022-11-05 RX ADMIN — HEPARIN SODIUM 5000 UNITS: 5000 INJECTION INTRAVENOUS; SUBCUTANEOUS at 21:32

## 2022-11-05 RX ADMIN — Medication 250 MG: at 09:09

## 2022-11-05 RX ADMIN — GABAPENTIN 300 MG: 300 CAPSULE ORAL at 21:32

## 2022-11-05 RX ADMIN — HEPARIN SODIUM 5000 UNITS: 5000 INJECTION INTRAVENOUS; SUBCUTANEOUS at 05:47

## 2022-11-05 RX ADMIN — AMLODIPINE BESYLATE 10 MG: 10 TABLET ORAL at 09:08

## 2022-11-05 RX ADMIN — HEPARIN SODIUM 5000 UNITS: 5000 INJECTION INTRAVENOUS; SUBCUTANEOUS at 13:51

## 2022-11-05 RX ADMIN — Medication 250 MG: at 17:34

## 2022-11-05 RX ADMIN — METOPROLOL TARTRATE 25 MG: 25 TABLET, FILM COATED ORAL at 21:34

## 2022-11-05 RX ADMIN — QUETIAPINE FUMARATE 50 MG: 25 TABLET ORAL at 17:34

## 2022-11-05 RX ADMIN — ACETAMINOPHEN 650 MG: 325 TABLET, FILM COATED ORAL at 05:46

## 2022-11-05 RX ADMIN — GABAPENTIN 300 MG: 300 CAPSULE ORAL at 17:34

## 2022-11-05 RX ADMIN — GABAPENTIN 300 MG: 300 CAPSULE ORAL at 09:09

## 2022-11-05 RX ADMIN — GABAPENTIN 300 MG: 300 CAPSULE ORAL at 12:16

## 2022-11-05 RX ADMIN — Medication 1 PATCH: at 09:09

## 2022-11-05 RX ADMIN — VENLAFAXINE HYDROCHLORIDE 187.5 MG: 37.5 CAPSULE, EXTENDED RELEASE ORAL at 09:09

## 2022-11-05 NOTE — ASSESSMENT & PLAN NOTE
· Felt to be multifactorial due to acute kidney injury, on top morphine and gabapentin use, infection  · Her MRI was noted to have severe periventricular and white matter hyperintensities which will need 3 month follow-up  · Improved  · Overall doing well with virtual one-to-one observation and low bed  · [facility] is looking at possibly accepting her next week

## 2022-11-05 NOTE — PLAN OF CARE
Problem: PAIN - ADULT  Goal: Verbalizes/displays adequate comfort level or baseline comfort level  Description: Interventions:  - Encourage patient to monitor pain and request assistance  - Assess pain using appropriate pain scale  - Administer analgesics based on type and severity of pain and evaluate response  - Implement non-pharmacological measures as appropriate and evaluate response  - Consider cultural and social influences on pain and pain management  - Notify physician/advanced practitioner if interventions unsuccessful or patient reports new pain  Outcome: Progressing     Problem: INFECTION - ADULT  Goal: Absence or prevention of progression during hospitalization  Description: INTERVENTIONS:  - Assess and monitor for signs and symptoms of infection  - Monitor lab/diagnostic results  - Monitor all insertion sites, i e  indwelling lines, tubes, and drains  - Monitor endotracheal if appropriate and nasal secretions for changes in amount and color  - [place] appropriate cooling/warming therapies per order  - Administer medications as ordered  - Instruct and encourage patient and family to use good hand hygiene technique  - Identify and instruct in appropriate isolation precautions for identified infection/condition  Outcome: Progressing     Problem: SAFETY ADULT  Goal: Patient will remain free of falls  Description: INTERVENTIONS:  - Educate patient/family on patient safety including physical limitations  - Instruct patient to call for assistance with activity   - Consult OT/PT to assist with strengthening/mobility   - Keep Call bell within reach  - Keep bed low and locked with side rails adjusted as appropriate  - Keep care items and personal belongings within reach  - Initiate and maintain comfort rounds  - Make Fall Risk Sign visible to staff  - Offer Toileting every 2 Hours, in advance of need  - Initiate/Maintain bed alarm  - Obtain necessary fall risk management equipment: walker  - Apply yellow socks and bracelet for high fall risk patients  - Consider moving patient to room near nurses station  Outcome: Progressing  Goal: Maintain or return to baseline ADL function  Description: INTERVENTIONS:  -  Assess patient's ability to carry out ADLs; assess patient's baseline for ADL function and identify physical deficits which impact ability to perform ADLs (bathing, care of mouth/teeth, toileting, grooming, dressing, etc )  - Assess/evaluate cause of self-care deficits   - Assess range of motion  - Assess patient's mobility; develop plan if impaired  - Assess patient's need for assistive devices and provide as appropriate  - Encourage maximum independence but intervene and supervise when necessary  - Involve family in performance of ADLs  - Assess for home care needs following discharge   - Consider OT consult to assist with ADL evaluation and planning for discharge  - Provide patient education as appropriate  Outcome: Progressing  Goal: Maintains/Returns to pre admission functional level  Description: INTERVENTIONS:  - Perform BMAT or MOVE assessment daily    - Set and communicate daily mobility goal to care team and patient/family/caregiver  - Collaborate with rehabilitation services on mobility goals if consulted  - Perform Range of Motion 4-6 times a day  - Reposition patient every 2 hours    - Dangle patient 3-4 times a day  - Stand patient 3 times a day  - Ambulate patient 3-4 times a day  - Out of bed for toileting  - Record patient progress and toleration of activity level   Outcome: Progressing     Problem: DISCHARGE PLANNING  Goal: Discharge to home or other facility with appropriate resources  Description: INTERVENTIONS:  - Identify barriers to discharge w/patient and caregiver  - Arrange for needed discharge resources and transportation as appropriate  - Identify discharge learning needs (meds, wound care, etc )  - Arrange for interpretive services to assist at discharge as needed  - Refer to Case Management Department for coordinating discharge planning if the patient needs post-hospital services based on physician/advanced practitioner order or complex needs related to functional status, cognitive ability, or social support system  Outcome: Progressing     Problem: Knowledge Deficit  Goal: Patient/family/caregiver demonstrates understanding of disease process, treatment plan, medications, and discharge instructions  Description: Complete learning assessment and assess knowledge base    Interventions:  - Provide teaching at level of understanding  - Provide teaching via preferred learning methods  Outcome: Progressing     Problem: Potential for Falls  Goal: Patient will remain free of falls  Description: INTERVENTIONS:  - Educate patient/family on patient safety including physical limitations  - Instruct patient to call for assistance with activity   - Consult OT/PT to assist with strengthening/mobility   - Keep Call bell within reach  - Keep bed low and locked with side rails adjusted as appropriate  - Keep care items and personal belongings within reach  - Initiate and maintain comfort rounds  - Make Fall Risk Sign visible to staff  - Offer Toileting every 2 Hours, in advance of need  - Initiate/Maintain bed alarm  - Obtain necessary fall risk management equipment: walker  - Apply yellow socks and bracelet for high fall risk patients  - Consider moving patient to room near nurses station  Outcome: Progressing     Problem: MOBILITY - ADULT  Goal: Maintain or return to baseline ADL function  Description: INTERVENTIONS:  -  Assess patient's ability to carry out ADLs; assess patient's baseline for ADL function and identify physical deficits which impact ability to perform ADLs (bathing, care of mouth/teeth, toileting, grooming, dressing, etc )  - Assess/evaluate cause of self-care deficits   - Assess range of motion  - Assess patient's mobility; develop plan if impaired  - Assess patient's need for assistive devices and provide as appropriate  - Encourage maximum independence but intervene and supervise when necessary  - Involve family in performance of ADLs  - Assess for home care needs following discharge   - Consider OT consult to assist with ADL evaluation and planning for discharge  - Provide patient education as appropriate  Outcome: Progressing  Goal: Maintains/Returns to pre admission functional level  Description: INTERVENTIONS:  - Perform BMAT or MOVE assessment daily    - Set and communicate daily mobility goal to care team and patient/family/caregiver  - Collaborate with rehabilitation services on mobility goals if consulted  - Perform Range of Motion 4-6 times a day  - Reposition patient every 2 hours  - Dangle patient 3-4 times a day  - Stand patient 3 times a day  - Ambulate patient 3-4 times a day  - Out of bed for toileting  - Record patient progress and toleration of activity level   Outcome: Progressing     Problem: Prexisting or High Potential for Compromised Skin Integrity  Goal: Skin integrity is maintained or improved  Description: INTERVENTIONS:  - Identify patients at risk for skin breakdown  - Assess and monitor skin integrity  - Assess and monitor nutrition and hydration status  - Monitor labs   - Assess for incontinence   - Turn and reposition patient  - Assist with mobility/ambulation  - Relieve pressure over bony prominences  - Avoid friction and shearing  - Provide appropriate hygiene as needed including keeping skin clean and dry  - Evaluate need for skin moisturizer/barrier cream  - Collaborate with interdisciplinary team   - Patient/family teaching  - Consider wound care consult   Outcome: Progressing     Problem: Nutrition/Hydration-ADULT  Goal: Nutrient/Hydration intake appropriate for improving, restoring or maintaining nutritional needs  Description: Monitor and assess patient's nutrition/hydration status for malnutrition   Collaborate with interdisciplinary team and initiate plan and interventions as ordered  Monitor patient's weight and dietary intake as ordered or per policy  Utilize nutrition screening tool and intervene as necessary  Determine patient's food preferences and provide high-protein, high-caloric foods as appropriate  INTERVENTIONS:  - Monitor oral intake, urinary output, labs, and treatment plans  - Assess nutrition and hydration status and recommend course of action  - Evaluate amount of meals eaten  - Assist patient with eating if necessary   - Allow adequate time for meals  - Recommend/ encourage appropriate diets, oral nutritional supplements, and vitamin/mineral supplements  - Order, calculate, and assess calorie counts as needed  - Recommend, monitor, and adjust tube feedings and TPN/PPN based on assessed needs  - Assess need for intravenous fluids  - Provide specific nutrition/hydration education as appropriate  - Include patient/family/caregiver in decisions related to nutrition  Outcome: Progressing     Problem: NEUROSENSORY - ADULT  Goal: Achieves maximal functionality and self care  Description: INTERVENTIONS  - Monitor swallowing and airway patency with patient fatigue and changes in neurological status  - Encourage and assist patient to increase activity and self care     - Encourage visually impaired, hearing impaired and aphasic patients to use assistive/communication devices  Outcome: Progressing  Goal: Achieves stable or improved neurological status  Description: INTERVENTIONS  - Monitor and report changes in neurological status  - Monitor vital signs such as temperature, blood pressure, glucose, and any other labs ordered   - Initiate measures to prevent increased intracranial pressure  - Monitor for seizure activity and implement precautions if appropriate      Outcome: Progressing     Problem: CARDIOVASCULAR - ADULT  Goal: Maintains optimal cardiac output and hemodynamic stability  Description: INTERVENTIONS:  - Monitor I/O, vital signs and rhythm  - Monitor for S/S and trends of decreased cardiac output  - Administer and titrate ordered vasoactive medications to optimize hemodynamic stability  - Assess quality of pulses, skin color and temperature  - Assess for signs of decreased coronary artery perfusion  - Instruct patient to report change in severity of symptoms  Outcome: Progressing     Problem: RESPIRATORY - ADULT  Goal: Achieves optimal ventilation and oxygenation  Description: INTERVENTIONS:  - Assess for changes in respiratory status  - Assess for changes in mentation and behavior  - Position to facilitate oxygenation and minimize respiratory effort  - Oxygen administered by appropriate delivery if ordered  - Initiate smoking cessation education as indicated  - Encourage broncho-pulmonary hygiene including cough, deep breathe, Incentive Spirometry  - Assess the need for suctioning and aspirate as needed  - Assess and instruct to report SOB or any respiratory difficulty  - Respiratory Therapy support as indicated  Outcome: Progressing     Problem: GASTROINTESTINAL - ADULT  Goal: Maintains adequate nutritional intake  Description: INTERVENTIONS:  - Monitor percentage of each meal consumed  - Identify factors contributing to decreased intake, treat as appropriate  - Assist with meals as needed  - Monitor I&O, weight, and lab values if indicated  - Obtain nutrition services referral as needed  Outcome: Progressing     Problem: METABOLIC, FLUID AND ELECTROLYTES - ADULT  Goal: Electrolytes maintained within normal limits  Description: INTERVENTIONS:  - Monitor labs and assess patient for signs and symptoms of electrolyte imbalances  - Administer electrolyte replacement as ordered  - Monitor response to electrolyte replacements, including repeat lab results as appropriate  - Instruct patient on fluid and nutrition as appropriate  Outcome: Progressing  Goal: Fluid balance maintained  Description: INTERVENTIONS:  - Monitor labs   - Monitor I/O and WT  - Instruct patient on fluid and nutrition as appropriate  - Assess for signs & symptoms of volume excess or deficit  Outcome: Progressing     Problem: SKIN/TISSUE INTEGRITY - ADULT  Goal: Skin Integrity remains intact(Skin Breakdown Prevention)  Description: Assess:  -Perform Erickson assessment every shift  -Clean and moisturize skin every day  -Inspect skin when repositioning, toileting, and assisting with ADLS  -Assess extremities for adequate circulation and sensation     Bed Management:  -Have minimal linens on bed & keep smooth, unwrinkled  -Change linens as needed when moist or perspiring  -Avoid sitting or lying in one position for more than 2 hours while in bed  -Keep HOB at 45 degrees     Toileting:  -Offer bedside commode  -Assess for incontinence every 2 hours  -Use incontinent care products after each incontinent episode such as brief    Activity:  -Mobilize patient 3-4 times a day  -Encourage activity and walks on unit  -Encourage or provide ROM exercises   -Turn and reposition patient every 2 Hours  -Use appropriate equipment to lift or move patient in bed  -Instruct/ Assist with weight shifting every 2 when out of bed in chair  -Consider limitation of chair time 2 hour intervals    Skin Care:  -Avoid use of baby powder, tape, friction and shearing, hot water or constrictive clothing  -Relieve pressure over bony prominences using pillows  -Do not massage red bony areas    Next Steps:  -Teach patient strategies to minimize risks such as walker  -Consider consults to  interdisciplinary teams such as wound  Outcome: Progressing  Goal: Incision(s), wounds(s) or drain site(s) healing without S/S of infection  Description: INTERVENTIONS  - Assess and document dressing, incision, wound bed, drain sites and surrounding tissue  - Provide patient and family education  - Perform skin care/dressing changes every PRN  Outcome: Progressing     Problem: MUSCULOSKELETAL - ADULT  Goal: Maintain or return mobility to safest level of function  Description: INTERVENTIONS:  - Assess patient's ability to carry out ADLs; assess patient's baseline for ADL function and identify physical deficits which impact ability to perform ADLs (bathing, care of mouth/teeth, toileting, grooming, dressing, etc )  - Assess/evaluate cause of self-care deficits   - Assess range of motion  - Assess patient's mobility  - Assess patient's need for assistive devices and provide as appropriate  - Encourage maximum independence but intervene and supervise when necessary  - Involve family in performance of ADLs  - Assess for home care needs following discharge   - Consider OT consult to assist with ADL evaluation and planning for discharge  - Provide patient education as appropriate  Outcome: Progressing

## 2022-11-05 NOTE — PROGRESS NOTES
[address]  Progress Note - Mary Overton 1970, 45 y o  female MRN: [id number]  Unit/Bed#: [place] 68 2 -01 Encounter: [id number]  Primary Care Provider: Lisa Goldberg MD   Date and time admitted to hospital: 9/19/2022  8:04 AM    * Toxic metabolic encephalopathy  Assessment & Plan  · Felt to be multifactorial due to acute kidney injury, on top morphine and gabapentin use, infection  · Her MRI was noted to have severe periventricular and white matter hyperintensities which will need 3 month follow-up  · Improved  · Overall doing well with virtual one-to-one observation and low bed  · [facility] is looking at possibly accepting her next week    Right foot pain  Assessment & Plan  · Neuropathic versus tendinitis  · Recent x-rays showed no significant degenerative disease and no fracture  · Findings discussed with   · Supportive care    Ambulatory dysfunction  Assessment & Plan  Ongoing DC planning together with case management and   Doing well on virtual observation  Possible DC to [facility] next week    Tobacco abuse  Assessment & Plan  · Continue Nicotine patch    Depression  Assessment & Plan  Stable  Continue Effexor 187 5 mg daily and Seroquel 50 mg b i d       VTE Pharmacologic Prophylaxis:   Pharmacologic: Enoxaparin (Lovenox)  Mechanical VTE Prophylaxis in Place: No    Patient Centered Rounds: I have performed bedside rounds with nursing staff today  Discussions with Specialists or Other Care Team Provider:  None    Education and Discussions with Family / Patient:  Discussed with     Time Spent for Care: 20 minutes  More than 50% of total time spent on counseling and coordination of care as described above      Current Length of Stay: 47 day(s)    Current Patient Status: Inpatient   Certification Statement: The patient will continue to require additional inpatient hospital stay due to Placement    Discharge Plan:  Possible discharge to [facility] next week    Code Status: Level 1 - Full Code      Subjective:   Seen and examined earlier during rounds  No events overnight  Ongoing one-to-one via virtual observer  Right foot pain, acute on chronic    Objective:     Vitals:   Temp (24hrs), Av °F (36 7 °C), Min:97 8 °F (36 6 °C), Max:98 1 °F (36 7 °C)    Temp:  [97 8 °F (36 6 °C)-98 1 °F (36 7 °C)] 98 1 °F (36 7 °C)  HR:  [] 93  Resp:  [16-20] 16  BP: ()/(66-76) 89/66  SpO2:  [94 %-99 %] 99 %  Body mass index is 18 4 kg/m²  Input and Output Summary (last 24 hours):     No intake or output data in the 24 hours ending 22 1649    Physical Exam:     Physical Exam  Constitutional:       Appearance: She is not ill-appearing  HENT:      Head: Normocephalic and atraumatic  Nose: No congestion or rhinorrhea  Eyes:      General: No scleral icterus  Cardiovascular:      Rate and Rhythm: Normal rate and regular rhythm  Heart sounds: No murmur heard  Pulmonary:      Effort: Pulmonary effort is normal  No respiratory distress  Breath sounds: No wheezing or rales  Abdominal:      General: Abdomen is flat  Palpations: Abdomen is soft  Musculoskeletal:      Cervical back: Neck supple  Comments: Right foot without warmth, swelling or discoloration   Skin:     General: Skin is warm and dry  Coloration: Skin is not jaundiced or pale  Neurological:      Mental Status: She is alert  Comments: Awake alert follows commands   Psychiatric:         Behavior: Behavior normal         I Have Reviewed All Lab Data Listed Above    * Additional Pertinent Lab Tests Reviewed: No New Labs Available For Today    Imaging:    Imaging Reports Reviewed Today Include:  Right foot and ankle x-rays    Recent Cultures (last 7 days):           Last 24 Hours Medication List:   Current Facility-Administered Medications   Medication Dose Route Frequency Provider Last Rate   • acetaminophen  650 mg Oral Q6H PRN Ronald Bynum DO • ALPRAZolam  0 5 mg Oral HS PRN Jayshree Serrano MD     • amLODIPine  10 mg Oral Daily Jenny Hughes MD     • gabapentin  300 mg Oral 4x Daily Balaji Nath, DO     • heparin (porcine)  5,000 Units Subcutaneous [facility] Wil Gautam Loco, DO     • levalbuterol  1 25 mg Nebulization Q4H PRN Adline Pollen, DO     • loperamide  2 mg Oral 4x Daily PRN Jayshree Serrano MD     • metoprolol tartrate  25 mg Oral Q12H [facility] Inder Abbott MD     • VANDANA ANTIFUNGAL   Topical BID Adline Pollen, DO     • nicotine  1 patch Transdermal Daily Atul Adan, DO     • OLANZapine  5 mg Oral Q6H PRN Adline Pollen, DO     • ondansetron  4 mg Intravenous Q4H PRN Lum Bread, DO     • psyllium  1 packet Oral Daily Balaji Nath, DO     • QUEtiapine  50 mg Oral BID Jayshree Serrano MD     • saccharomyces boulardii  250 mg Oral BID Jayshree Serrano MD     • traMADol  50 mg Oral BID PRN CARLITOS Khan     • venlafaxine  187 5 mg Oral Daily Marla Henriquez, DO          Today, Patient Was Seen By: Mamta Carrillo    ** Please Note: Dictation voice to text software may have been used in the creation of this document   **

## 2022-11-05 NOTE — PLAN OF CARE
Problem: PAIN - ADULT  Goal: Verbalizes/displays adequate comfort level or baseline comfort level  Description: Interventions:  - Encourage patient to monitor pain and request assistance  - Assess pain using appropriate pain scale  - Administer analgesics based on type and severity of pain and evaluate response  - Implement non-pharmacological measures as appropriate and evaluate response  - Consider cultural and social influences on pain and pain management  - Notify physician/advanced practitioner if interventions unsuccessful or patient reports new pain  Outcome: Progressing     Problem: INFECTION - ADULT  Goal: Absence or prevention of progression during hospitalization  Description: INTERVENTIONS:  - Assess and monitor for signs and symptoms of infection  - Monitor lab/diagnostic results  - Monitor all insertion sites, i e  indwelling lines, tubes, and drains  - Monitor endotracheal if appropriate and nasal secretions for changes in amount and color  - Eugene appropriate cooling/warming therapies per order  - Administer medications as ordered  - Instruct and encourage patient and family to use good hand hygiene technique  - Identify and instruct in appropriate isolation precautions for identified infection/condition  Outcome: Progressing     Problem: SAFETY ADULT  Goal: Patient will remain free of falls  Description: INTERVENTIONS:  - Educate patient/family on patient safety including physical limitations  - Instruct patient to call for assistance with activity   - Consult OT/PT to assist with strengthening/mobility   - Keep Call bell within reach  - Keep bed low and locked with side rails adjusted as appropriate  - Keep care items and personal belongings within reach  - Initiate and maintain comfort rounds  - Make Fall Risk Sign visible to staff  - Offer Toileting every 2 Hours, in advance of need  - Initiate/Maintain bed alarm  - Obtain necessary fall risk management equipment: walker  - Apply yellow socks and bracelet for high fall risk patients  - Consider moving patient to room near nurses station  Outcome: Progressing  Goal: Maintain or return to baseline ADL function  Description: INTERVENTIONS:  -  Assess patient's ability to carry out ADLs; assess patient's baseline for ADL function and identify physical deficits which impact ability to perform ADLs (bathing, care of mouth/teeth, toileting, grooming, dressing, etc )  - Assess/evaluate cause of self-care deficits   - Assess range of motion  - Assess patient's mobility; develop plan if impaired  - Assess patient's need for assistive devices and provide as appropriate  - Encourage maximum independence but intervene and supervise when necessary  - Involve family in performance of ADLs  - Assess for home care needs following discharge   - Consider OT consult to assist with ADL evaluation and planning for discharge  - Provide patient education as appropriate  Outcome: Progressing  Goal: Maintains/Returns to pre admission functional level  Description: INTERVENTIONS:  - Perform BMAT or MOVE assessment daily    - Set and communicate daily mobility goal to care team and patient/family/caregiver  - Collaborate with rehabilitation services on mobility goals if consulted  - Perform Range of Motion 4-6 times a day  - Reposition patient every 2 hours    - Dangle patient 3-4 times a day  - Stand patient 3 times a day  - Ambulate patient 3-4 times a day  - Out of bed for toileting  - Record patient progress and toleration of activity level   Outcome: Progressing     Problem: DISCHARGE PLANNING  Goal: Discharge to home or other facility with appropriate resources  Description: INTERVENTIONS:  - Identify barriers to discharge w/patient and caregiver  - Arrange for needed discharge resources and transportation as appropriate  - Identify discharge learning needs (meds, wound care, etc )  - Arrange for interpretive services to assist at discharge as needed  - Refer to Case Management Department for coordinating discharge planning if the patient needs post-hospital services based on physician/advanced practitioner order or complex needs related to functional status, cognitive ability, or social support system  Outcome: Progressing     Problem: Knowledge Deficit  Goal: Patient/family/caregiver demonstrates understanding of disease process, treatment plan, medications, and discharge instructions  Description: Complete learning assessment and assess knowledge base    Interventions:  - Provide teaching at level of understanding  - Provide teaching via preferred learning methods  Outcome: Progressing     Problem: Potential for Falls  Goal: Patient will remain free of falls  Description: INTERVENTIONS:  - Educate patient/family on patient safety including physical limitations  - Instruct patient to call for assistance with activity   - Consult OT/PT to assist with strengthening/mobility   - Keep Call bell within reach  - Keep bed low and locked with side rails adjusted as appropriate  - Keep care items and personal belongings within reach  - Initiate and maintain comfort rounds  - Make Fall Risk Sign visible to staff  - Offer Toileting every 2 Hours, in advance of need  - Initiate/Maintain bed alarm  - Obtain necessary fall risk management equipment: walker  - Apply yellow socks and bracelet for high fall risk patients  - Consider moving patient to room near nurses station  Outcome: Progressing     Problem: MOBILITY - ADULT  Goal: Maintain or return to baseline ADL function  Description: INTERVENTIONS:  -  Assess patient's ability to carry out ADLs; assess patient's baseline for ADL function and identify physical deficits which impact ability to perform ADLs (bathing, care of mouth/teeth, toileting, grooming, dressing, etc )  - Assess/evaluate cause of self-care deficits   - Assess range of motion  - Assess patient's mobility; develop plan if impaired  - Assess patient's need for assistive devices and provide as appropriate  - Encourage maximum independence but intervene and supervise when necessary  - Involve family in performance of ADLs  - Assess for home care needs following discharge   - Consider OT consult to assist with ADL evaluation and planning for discharge  - Provide patient education as appropriate  Outcome: Progressing  Goal: Maintains/Returns to pre admission functional level  Description: INTERVENTIONS:  - Perform BMAT or MOVE assessment daily    - Set and communicate daily mobility goal to care team and patient/family/caregiver  - Collaborate with rehabilitation services on mobility goals if consulted  - Perform Range of Motion 4-6 times a day  - Reposition patient every 2 hours  - Dangle patient 3-4 times a day  - Stand patient 3 times a day  - Ambulate patient 3-4 times a day  - Out of bed for toileting  - Record patient progress and toleration of activity level   Outcome: Progressing     Problem: Prexisting or High Potential for Compromised Skin Integrity  Goal: Skin integrity is maintained or improved  Description: INTERVENTIONS:  - Identify patients at risk for skin breakdown  - Assess and monitor skin integrity  - Assess and monitor nutrition and hydration status  - Monitor labs   - Assess for incontinence   - Turn and reposition patient  - Assist with mobility/ambulation  - Relieve pressure over bony prominences  - Avoid friction and shearing  - Provide appropriate hygiene as needed including keeping skin clean and dry  - Evaluate need for skin moisturizer/barrier cream  - Collaborate with interdisciplinary team   - Patient/family teaching  - Consider wound care consult   Outcome: Progressing     Problem: Nutrition/Hydration-ADULT  Goal: Nutrient/Hydration intake appropriate for improving, restoring or maintaining nutritional needs  Description: Monitor and assess patient's nutrition/hydration status for malnutrition   Collaborate with interdisciplinary team and initiate plan and interventions as ordered  Monitor patient's weight and dietary intake as ordered or per policy  Utilize nutrition screening tool and intervene as necessary  Determine patient's food preferences and provide high-protein, high-caloric foods as appropriate  INTERVENTIONS:  - Monitor oral intake, urinary output, labs, and treatment plans  - Assess nutrition and hydration status and recommend course of action  - Evaluate amount of meals eaten  - Assist patient with eating if necessary   - Allow adequate time for meals  - Recommend/ encourage appropriate diets, oral nutritional supplements, and vitamin/mineral supplements  - Order, calculate, and assess calorie counts as needed  - Recommend, monitor, and adjust tube feedings and TPN/PPN based on assessed needs  - Assess need for intravenous fluids  - Provide specific nutrition/hydration education as appropriate  - Include patient/family/caregiver in decisions related to nutrition  Outcome: Progressing     Problem: NEUROSENSORY - ADULT  Goal: Achieves maximal functionality and self care  Description: INTERVENTIONS  - Monitor swallowing and airway patency with patient fatigue and changes in neurological status  - Encourage and assist patient to increase activity and self care     - Encourage visually impaired, hearing impaired and aphasic patients to use assistive/communication devices  Outcome: Progressing  Goal: Achieves stable or improved neurological status  Description: INTERVENTIONS  - Monitor and report changes in neurological status  - Monitor vital signs such as temperature, blood pressure, glucose, and any other labs ordered   - Initiate measures to prevent increased intracranial pressure  - Monitor for seizure activity and implement precautions if appropriate      Outcome: Progressing     Problem: CARDIOVASCULAR - ADULT  Goal: Maintains optimal cardiac output and hemodynamic stability  Description: INTERVENTIONS:  - Monitor I/O, vital signs and rhythm  - Monitor for S/S and trends of decreased cardiac output  - Administer and titrate ordered vasoactive medications to optimize hemodynamic stability  - Assess quality of pulses, skin color and temperature  - Assess for signs of decreased coronary artery perfusion  - Instruct patient to report change in severity of symptoms  Outcome: Progressing     Problem: RESPIRATORY - ADULT  Goal: Achieves optimal ventilation and oxygenation  Description: INTERVENTIONS:  - Assess for changes in respiratory status  - Assess for changes in mentation and behavior  - Position to facilitate oxygenation and minimize respiratory effort  - Oxygen administered by appropriate delivery if ordered  - Initiate smoking cessation education as indicated  - Encourage broncho-pulmonary hygiene including cough, deep breathe, Incentive Spirometry  - Assess the need for suctioning and aspirate as needed  - Assess and instruct to report SOB or any respiratory difficulty  - Respiratory Therapy support as indicated  Outcome: Progressing     Problem: GASTROINTESTINAL - ADULT  Goal: Maintains adequate nutritional intake  Description: INTERVENTIONS:  - Monitor percentage of each meal consumed  - Identify factors contributing to decreased intake, treat as appropriate  - Assist with meals as needed  - Monitor I&O, weight, and lab values if indicated  - Obtain nutrition services referral as needed  Outcome: Progressing     Problem: METABOLIC, FLUID AND ELECTROLYTES - ADULT  Goal: Electrolytes maintained within normal limits  Description: INTERVENTIONS:  - Monitor labs and assess patient for signs and symptoms of electrolyte imbalances  - Administer electrolyte replacement as ordered  - Monitor response to electrolyte replacements, including repeat lab results as appropriate  - Instruct patient on fluid and nutrition as appropriate  Outcome: Progressing  Goal: Fluid balance maintained  Description: INTERVENTIONS:  - Monitor labs   - Monitor I/O and WT  - Instruct patient on fluid and nutrition as appropriate  - Assess for signs & symptoms of volume excess or deficit  Outcome: Progressing     Problem: SKIN/TISSUE INTEGRITY - ADULT  Goal: Skin Integrity remains intact(Skin Breakdown Prevention)  Description: Assess:  -Perform Erickson assessment every shift  -Clean and moisturize skin every day  -Inspect skin when repositioning, toileting, and assisting with ADLS  -Assess extremities for adequate circulation and sensation     Bed Management:  -Have minimal linens on bed & keep smooth, unwrinkled  -Change linens as needed when moist or perspiring  -Avoid sitting or lying in one position for more than 2 hours while in bed  -Keep HOB at 45 degrees     Toileting:  -Offer bedside commode  -Assess for incontinence every 2 hours  -Use incontinent care products after each incontinent episode such as brief    Activity:  -Mobilize patient 3-4 times a day  -Encourage activity and walks on unit  -Encourage or provide ROM exercises   -Turn and reposition patient every 2 Hours  -Use appropriate equipment to lift or move patient in bed  -Instruct/ Assist with weight shifting every 2 when out of bed in chair  -Consider limitation of chair time 2 hour intervals    Skin Care:  -Avoid use of baby powder, tape, friction and shearing, hot water or constrictive clothing  -Relieve pressure over bony prominences using pillows  -Do not massage red bony areas    Next Steps:  -Teach patient strategies to minimize risks such as walker  -Consider consults to  interdisciplinary teams such as wound  Outcome: Progressing  Goal: Incision(s), wounds(s) or drain site(s) healing without S/S of infection  Description: INTERVENTIONS  - Assess and document dressing, incision, wound bed, drain sites and surrounding tissue  - Provide patient and family education  - Perform skin care/dressing changes every PRN  Outcome: Progressing     Problem: MUSCULOSKELETAL - ADULT  Goal: Maintain or return mobility to safest level of function  Description: INTERVENTIONS:  - Assess patient's ability to carry out ADLs; assess patient's baseline for ADL function and identify physical deficits which impact ability to perform ADLs (bathing, care of mouth/teeth, toileting, grooming, dressing, etc )  - Assess/evaluate cause of self-care deficits   - Assess range of motion  - Assess patient's mobility  - Assess patient's need for assistive devices and provide as appropriate  - Encourage maximum independence but intervene and supervise when necessary  - Involve family in performance of ADLs  - Assess for home care needs following discharge   - Consider OT consult to assist with ADL evaluation and planning for discharge  - Provide patient education as appropriate  Outcome: Progressing

## 2022-11-06 RX ADMIN — QUETIAPINE FUMARATE 50 MG: 25 TABLET ORAL at 18:07

## 2022-11-06 RX ADMIN — Medication 250 MG: at 09:22

## 2022-11-06 RX ADMIN — GABAPENTIN 300 MG: 300 CAPSULE ORAL at 18:07

## 2022-11-06 RX ADMIN — TRAMADOL HYDROCHLORIDE 50 MG: 50 TABLET, COATED ORAL at 19:59

## 2022-11-06 RX ADMIN — HEPARIN SODIUM 5000 UNITS: 5000 INJECTION INTRAVENOUS; SUBCUTANEOUS at 13:24

## 2022-11-06 RX ADMIN — HEPARIN SODIUM 5000 UNITS: 5000 INJECTION INTRAVENOUS; SUBCUTANEOUS at 06:58

## 2022-11-06 RX ADMIN — Medication 250 MG: at 18:07

## 2022-11-06 RX ADMIN — ACETAMINOPHEN 650 MG: 325 TABLET, FILM COATED ORAL at 18:07

## 2022-11-06 RX ADMIN — VENLAFAXINE HYDROCHLORIDE 187.5 MG: 37.5 CAPSULE, EXTENDED RELEASE ORAL at 09:23

## 2022-11-06 RX ADMIN — ALPRAZOLAM 0.5 MG: 0.5 TABLET ORAL at 21:33

## 2022-11-06 RX ADMIN — HEPARIN SODIUM 5000 UNITS: 5000 INJECTION INTRAVENOUS; SUBCUTANEOUS at 21:27

## 2022-11-06 RX ADMIN — QUETIAPINE FUMARATE 50 MG: 25 TABLET ORAL at 09:22

## 2022-11-06 RX ADMIN — METOPROLOL TARTRATE 25 MG: 25 TABLET, FILM COATED ORAL at 09:22

## 2022-11-06 RX ADMIN — GABAPENTIN 300 MG: 300 CAPSULE ORAL at 13:00

## 2022-11-06 RX ADMIN — Medication 1 PATCH: at 09:21

## 2022-11-06 RX ADMIN — GABAPENTIN 300 MG: 300 CAPSULE ORAL at 09:22

## 2022-11-06 RX ADMIN — TRAMADOL HYDROCHLORIDE 50 MG: 50 TABLET, COATED ORAL at 04:32

## 2022-11-06 RX ADMIN — AMLODIPINE BESYLATE 10 MG: 10 TABLET ORAL at 09:22

## 2022-11-06 RX ADMIN — MICONAZOLE NITRATE: 20 CREAM TOPICAL at 09:26

## 2022-11-06 RX ADMIN — GABAPENTIN 300 MG: 300 CAPSULE ORAL at 21:27

## 2022-11-06 NOTE — PLAN OF CARE
Problem: PHYSICAL THERAPY ADULT  Goal: Performs mobility at highest level of function for planned discharge setting  See evaluation for individualized goals  Description: Treatment/Interventions: Functional transfer training, LE strengthening/ROM, Elevations, Therapeutic exercise, Cognitive reorientation, Patient/family training, Equipment eval/education, Bed mobility, Gait training, Compensatory technique education, Continued evaluation, Spoke to nursing, Spoke to case management, Spoke to MD, OT, ST  Equipment Recommended: N[employer] (if patient does not own)       See flowsheet documentation for full assessment, interventions and recommendations  Outcome: Progressing  Note: Prognosis: Fair  Problem List: Impaired balance, Decreased cognition, Impaired judgement, Decreased safety awareness, Decreased coordination, Pain, Decreased strength  Assessment: Kate Womackpantera reporting 8/10 pain in R foot however in no obvious distress during session and w no reported increase in pain w WB/activity  Needs assist to [facility] however tolerated fit well  Fluctuating level of assist during transf, [place] sit>stand, given occasional unsteadiness  Seemed to ambulate better w RW or no AD versus SPC as patient had trouble sequencing/ coordinating SPC during gait  Somewhat improved lateral deviations/ obstacle avoidance  No gross LOB but continues to be at risk for falls given multiple factors including cognition, ataxia  Currently ambulating community distances  Demonstrates improvements in stair negotiation today however needs constant cues for proper sequencing and hands on guarding due to instability of RLE  Would continue to benefit from therapy services to facilitate recovery and reduce fall risk  Barriers to Discharge: Decreased caregiver support  Barriers to Discharge Comments: needs 24/7 S  PT Discharge Recommendation: Post acute rehabilitation services (vs 24/7 S and OPPT)    See flowsheet documentation for full assessment

## 2022-11-06 NOTE — PHYSICAL THERAPY NOTE
PHYSICAL THERAPY NOTE          Patient Name: Papo Verma  [id number] Date: 11/6/2022  Time: 9056-4596       11/06/22 1416   PT Last Visit   PT Visit Date 11/06/22   Note Type   Note Type Treatment for insurance authorization   Type of Brace   Brace Applied Posterior Leaf MAFO (Molded Ankle Foot Orthosis)   Patient Position When Brace Applied Seated   Pain Assessment   Pain Assessment Tool 0-10   Pain Score 8   Pain Location/Orientation Orientation: Right;[place] Foot   Hospital Pain Intervention(s) Repositioned; Ambulation/increased activity; Emotional support; Rest  (brace)   Pain Rating: FLACC (Rest) - Face 0   Pain Rating: FLACC (Rest) - Legs 0   Pain Rating: FLACC (Rest) - Activity 0   Pain Rating: FLACC (Rest) - Cry 1   Pain Rating: FLACC (Rest) - Consolability 0   Score: FLACC (Rest) 1   Pain Rating: FLACC (Activity) - Face 0   Pain Rating: FLACC (Activity) - Legs 0   Pain Rating: FLACC (Activity) - Activity 0   Pain Rating: FLACC (Activity) - Cry 0   Pain Rating: FLACC (Activity) - Consolability 0   Score: FLACC (Activity) 0   Restrictions/Precautions   Weight Bearing Precautions Per Order No   Other Precautions 1:1;Cognitive; Chair Alarm; Bed Alarm; Fall Risk;Pain  (virtual 1:1)   General   Chart Reviewed Yes   Response to Previous Treatment Patient unable to report, no changes reported from family or staff   Family/Caregiver Present Yes  (spouse present end of session)   Cognition   Overall Cognitive Status Impaired   Arousal/Participation Cooperative   Attention Attends with cues to redirect   Orientation Level Oriented to person;Oriented to place  (general to time, place)   Memory Decreased recall of precautions;Decreased recall of recent events;Decreased short term memory   Following Commands Follows one step commands with increased time or repetition   Comments impaired processing, problem solving, safety awareness Subjective   Subjective "I would call my dad in an emergency"   Bed Mobility   Supine to Sit 6  Modified independent   Additional items Bedrails; Increased time required   Transfers   Additional items Assist x 1; Increased time required   Stand to Sit 4  Minimal assistance   Additional items Increased time required;Assist x 1   Additional Comments fluctuates between close S- CGA for standing transf   Ambulation/Elevation   Gait pattern Improper Weight shift;Narrow SOY;Scissoring; Ataxia   Gait Assistance   (CGA/min A w no AD and SPC  close S for RW)   Additional items Assist x 1   Assistive Device Rolling walker;SPC;None   Distance 200' (w no AD, RW) and 180' (w SPC, no AD)   Stair Management Assistance 4  Minimal assist  (CGA)   Additional items Assist x 1;Verbal cues  (vc for sequencing to improve safety given RLE weakness)   Stair Management Technique One rail R;One rail L;Alternating pattern; Step to pattern; Foreward   Number of Stairs 9   Ambulation/Elevation Additional Comments ascend alternating w RHR  descend step to w [facility]   Balance   Static Standing Fair -   Dynamic Standing Poor +   Ambulatory Poor +   Endurance Deficit   Endurance Deficit No   Activity Tolerance   Activity Tolerance Patient tolerated treatment well   Medical Staff Made Aware Donna OT   Nurse Made Aware Aria RN   Assessment   Prognosis Fair   Problem List Impaired balance;Decreased cognition; Impaired judgement;Decreased safety awareness;Decreased coordination;Pain;Decreased strength   Assessment Nuris reporting 8/10 pain in R foot however in no obvious distress during session and w no reported increase in pain w WB/activity  Needs assist to [facility] however tolerated fit well  Fluctuating level of assist during transf, parrish sit>stand, given occasional unsteadiness  Seemed to ambulate better w RW or no AD versus SPC as patient had trouble sequencing/ coordinating SPC during gait  Somewhat improved lateral deviations/ obstacle avoidance   No gross LOB but continues to be at risk for falls given multiple factors including cognition, ataxia  Currently ambulating community distances  Demonstrates improvements in stair negotiation today however needs constant cues for proper sequencing and hands on guarding due to instability of RLE  Would continue to benefit from therapy services to facilitate recovery and reduce fall risk  Barriers to Discharge Decreased caregiver support   Barriers to Discharge Comments needs 24/7 S   Goals   Patient Goals go home soon   STG Expiration Date 11/15/22   PT Treatment Day 18   Plan   Treatment/Interventions Functional transfer training;LE strengthening/ROM; Elevations; Therapeutic exercise;Cognitive reorientation;Equipment eval/education;Patient/family training;Gait training;Bed mobility; Compensatory technique education;Continued evaluation;Spoke to nursing;OT;Family   Progress Progressing toward goals   PT Frequency 3-5x/wk   Recommendation   PT Discharge Recommendation Post acute rehabilitation services  (vs 24/7 S and OPPT)   Equipment Recommended 9 [facility] Recommended Wheeled walker   Change/add to [employer]? No   AM-PAC Basic Mobility Inpatient   Turning in Bed Without Bedrails 4   Lying on Back to Sitting on Edge of Flat Bed 4   Moving Bed to Chair 3   Standing Up From Chair 3   Walk in Room 3   Climb 3-5 Stairs 3   Basic Mobility Inpatient Raw Score 20   Basic Mobility Standardized Score 43 99   Highest Level Of Mobility   JH-HLM Goal 6: Walk 10 steps or more   JH-HLM Achieved 8: Walk 250 feet ot more   Education   Education Provided Mobility training;Assistive device   Patient Reinforcement needed   End of Consult   Patient Position at End of Consult Seated edge of bed; All needs within reach   End of Consult Comments 1:1 on and spouse present       Tammie Epley, PT

## 2022-11-06 NOTE — OCCUPATIONAL THERAPY NOTE
Occupational Therapy Progress Note     Patient Name: Chante Small  Today's Date: 11/6/2022  Problem List  Principal Problem:    Toxic metabolic encephalopathy  Active Problems:    Depression    Tobacco abuse    DDD (degenerative disc disease), lumbosacral    ARF (acute renal failure) (HCC)    Transaminitis    Abnormal CT of the chest    Traumatic rhabdomyolysis (HCC)    D-dimer, elevated    Leg edema, right    Localized swelling of right upper extremity    Bacteremia due to methicillin susceptible Staphylococcus aureus (MSSA)    Pulmonary edema    Aspiration pneumonitis (HCC)    Hypokalemia    Hypernatremia    Diarrhea    Mild protein-calorie malnutrition (HCC)    Moderate protein-calorie malnutrition ([address] )    Ambulatory dysfunction    Right foot pain        11/06/22 1415   OT Last Visit   OT Visit Date 11/06/22   Note Type   Note Type Treatment for insurance authorization   Pain Assessment   Pain Assessment Tool 0-10   Pain Score 8   Pain Location/Orientation Orientation: Right;[place] Foot   Hospital Pain Intervention(s) Ambulation/increased activity;Repositioned; Emotional support   Pain Rating: FLACC (Rest) - Face 0   Pain Rating: FLACC (Rest) - Legs 0   Pain Rating: FLACC (Rest) - Activity 0   Pain Rating: FLACC (Rest) - Cry 0   Pain Rating: FLACC (Rest) - Consolability 0   Score: FLACC (Rest) 0   Pain Rating: FLACC (Activity) - Face 0   Pain Rating: FLACC (Activity) - Legs 0   Pain Rating: FLACC (Activity) - Activity 0   Pain Rating: FLACC (Activity) - Cry 1   Pain Rating: FLACC (Activity) - Consolability 1   Score: FLACC (Activity) 2   Restrictions/Precautions   Weight Bearing Precautions Per Order No   Braces or Orthoses   (R MAFO)   Other Precautions Cognitive; Chair Alarm; Bed Alarm;Multiple lines; Fall Risk;Pain   ADL   Where Assessed Edge of bed   Grooming Assistance 5  Supervision/Setup   Grooming Deficit Supervision/safety; Increased time to complete;Wash/dry face; Wash/dry hands   Grooming Comments close supervision at sink to wash hands   [address]  Supervision/Setup   UB Dressing Deficit Setup;Supervision/safety;Verbal cueing; Increased time to complete   LB Dressing Assistance 4  Minimal Assistance   LB Dressing Deficit Steadying;Setup; Requires assistive device for steadying;Verbal cueing; Increased time to complete;Supervision/safety; Thread RLE into underwear; Thread LLE into underwear; Thread LLE into pants; Thread RLE into pants;Pull up over hips   LB Dressing Comments crossed leg technique to don/doff socks and thread LEs into pants and underwear and min assist steadying support   Toileting Assistance  4  Minimal Assistance   Toileting Deficit Setup;Verbal cueing;Supervison/safety; Increased time to complete;Steadying;Clothing management up;Clothing management down;Perineal hygiene   Functional Standing Tolerance   Time 3 min   Activity w/ contact guard assist for dynamic standing balance tasks   Comments w/ no LOB   Bed Mobility   Supine to Sit 6  Modified independent   Additional items Assist x 1; Increased time required;Verbal cues;LE management; Bedrails;HOB elevated   Sit to Supine 6  Modified independent   Additional items Increased time required;Verbal cues; Bedrails   Transfers   Sit to Stand 4  Minimal assistance   Additional items Assist x 1; Increased time required;Verbal cues;Armrests   Stand to Sit 4  Minimal assistance   Additional items Assist x 1; Increased time required;Verbal cues;Armrests   Toilet transfer 4  Minimal assistance   Additional items Assist x 1; Increased time required;Verbal cues;Standard toilet   Additional Comments sit>stand fluctuations between close supervision and min assist; cues for safety stand>sit w/ slight LOB at times   Functional Mobility   Functional Mobility 4  Minimal assistance   Additional Comments assist x1 w/ RW and no AD, slight LOB 2-3x during session w/ min assist to regain balance and cues for safety, ? figure ground disturbance w/ vision Additional items Rolling walker   Cognition   Overall Cognitive Status Impaired   Arousal/Participation Responsive; Cooperative   Attention Attends with cues to redirect   Orientation Level Oriented to person;Oriented to place;Oriented to time  (oriented to month, stated year as 2012)   Memory Decreased short term memory;Decreased recall of precautions;Decreased recall of recent events   Following Commands Follows one step commands with increased time or repetition   Comments increased processing time for multistep commands, decreased problem solving, impaired insight and safety awareness; when asked what would she do if she fell at home she stated: "I would call my dad, then prompt what if he didn't answer, I would call my mom" then asked what is the emergency number "I don't know", then gave clues and stated "911"; able to perform simple calculations (addition/subtraction multiplication 3/6 correct)   Additional Activities   Additional Activities   (education on safety and positioning)   Additional Activities Comments pt receptive   Activity Tolerance   Activity Tolerance Patient limited by fatigue;Treatment limited secondary to medical complications (Comment)   Medical Staff Made Aware appropriate to see per Helen ALVARADO   Assessment   Assessment Pt seen for skilled OT session focused on ADLs, functional transfers and mobility, dynamic standing balance and cognitive problem solving  Pt MOD I supine> sit bed mobility w/ increased time to complete  Pt w/ MIN assist LB Dressing: crossed leg technique to don/doff socks and thread LEs into pants and underwear and min assist steadying support to pull up over hips and assist to don MAFO and shoe on R foot/ankle  Pt w/ min assist sit>stand w/ VCs for safety from bed and toilet, pt w/ slight LOB at times w/ transitions from stand>sit transfers   Pt w/ Min assist x1 functional mobility w/ RW or no AD w/ 2-3x LOB in homelike environment requiring min assist from therapist to maintain  Pt w/ min assist simulated IADL task of item retrieval in closet w/ increased time to complete and contact guard assist  Pt w/ contact guard assist w/ dynamic standing balance tasks of reaching for correct number answer taped on wall for simple addition/subtraction calculations and completed 3/6 correctly w/ increased time and repeated directions  Pt seated w/ bed alarm intact end of session and  present  Pt continues to be limited due to impaired balance, impaired problem solving, impaired executive functioning, decreased safety awareness, decreased strength, impaired cognition all causing a decline in ADLs, functional transfers and mobility  Recommend STR when medically stable  Will continue to follow  The patient's raw score on the AM-PAC Daily Activity inpatient short form is 18, standardized score is 38 66, less than 39 4  Patients at this level are likely to benefit from discharge to post-acute rehabilitation services  Please refer to the recommendation of the Occupational Therapist for safe discharge planning  Plan   Treatment Interventions ADL retraining;Functional transfer training;UE strengthening/ROM; Endurance training;Cognitive reorientation;Patient/family training;Equipment evaluation/education; Compensatory technique education; Energy conservation; Activityengagement   Goal Expiration Date 11/15/22   OT Treatment Day 14   OT Frequency 2-3x/wk   Recommendation   OT Discharge Recommendation Post acute rehabilitation services   [facility] Daily Activity Inpatient   Lower Body Dressing 3   Bathing 3   Toileting 3   Upper Body Dressing 3   Grooming 3   Eating 3   Daily Activity Raw Score 18   Daily Activity Standardized Score (Calc for Raw Score >=11) 38 66   [facility] Applied Cognition Inpatient   Following a Speech/Presentation 3   Understanding Ordinary Conversation 3   Taking Medications 2   Remembering Where Things Are Placed or Put Away 2   Remembering List of 4-5 Errands 2   Taking Care of Complicated Tasks 2   Applied Cognition Raw Score 14   Applied Cognition Standardized Score 32 02   Modified Worton Scale   Modified Worton Scale 4     Documentation completed by: Maron Litten, MS, OTR/L

## 2022-11-06 NOTE — PLAN OF CARE
Problem: OCCUPATIONAL THERAPY ADULT  Goal: Performs self-care activities at highest level of function for planned discharge setting  See evaluation for individualized goals  Description: Treatment Interventions: ADL retraining, Functional transfer training, UE strengthening/ROM, Endurance training, Cognitive reorientation, Patient/family training, Equipment evaluation/education, Compensatory technique education, Energy conservation, Activityengagement          See flowsheet documentation for full assessment, interventions and recommendations  11/6/2022 1508 by Ban Noel OT  Outcome: Progressing  Note: Limitation: Decreased ADL status, Decreased Safe judgement during ADL, Decreased cognition, Decreased endurance, Decreased high-level ADLs     Assessment: Pt seen for skilled OT session focused on ADLs, functional transfers and mobility, dynamic standing balance and cognitive problem solving  Pt MOD I supine> sit bed mobility w/ increased time to complete  Pt w/ MIN assist LB Dressing: crossed leg technique to don/doff socks and thread LEs into pants and underwear and min assist steadying support to pull up over hips and assist to don MAFO and shoe on R foot/ankle  Pt w/ min assist sit>stand w/ VCs for safety from bed and toilet, pt w/ slight LOB at times w/ transitions from stand>sit transfers  Pt w/ Min assist x1 functional mobility w/ RW or no AD w/ 2-3x LOB in homelike environment requiring min assist from therapist to maintain  Pt w/ min assist simulated IADL task of item retrieval in closet w/ increased time to complete and contact guard assist  Pt w/ contact guard assist w/ dynamic standing balance tasks of reaching for correct number answer taped on wall for simple addition/subtraction calculations and completed 3/6 correctly w/ increased time and repeated directions  Pt seated w/ bed alarm intact end of session and  present   Pt continues to be limited due to impaired balance, impaired problem solving, impaired executive functioning, decreased safety awareness, decreased strength, impaired cognition all causing a decline in ADLs, functional transfers and mobility  Recommend STR when medically stable  Will continue to follow  The patient's raw score on the AM-PAC Daily Activity inpatient short form is 18, standardized score is 38 66, less than 39 4  Patients at this level are likely to benefit from discharge to post-acute rehabilitation services  Please refer to the recommendation of the Occupational Therapist for safe discharge planning  OT Discharge Recommendation: Post acute rehabilitation services       11/6/2022 1508 by Ebony Vasquez OT  Note: Limitation: Decreased ADL status, Decreased Safe judgement during ADL, Decreased cognition, Decreased endurance, Decreased high-level ADLs     Assessment: Pt seen for skilled OT session focused on ADLs, functional transfers and mobility, dynamic standing balance and cognitive problem solving  Pt MOD I supine> sit bed mobility w/ increased time to complete  Pt w/ MIN assist LB Dressing: crossed leg technique to don/doff socks and thread LEs into pants and underwear and min assist steadying support to pull up over hips and assist to don MAFO and shoe on R foot/ankle  Pt w/ min assist sit>stand w/ VCs for safety from bed and toilet, pt w/ slight LOB at times w/ transitions from stand>sit transfers  Pt w/ Min assist x1 functional mobility w/ RW or no AD w/ 2-3x LOB in homelike environment requiring min assist from therapist to maintain  Pt w/ min assist simulated IADL task of item retrieval in closet w/ increased time to complete and contact guard assist  Pt w/ contact guard assist w/ dynamic standing balance tasks of reaching for correct number answer taped on wall for simple addition/subtraction calculations and completed 3/6 correctly w/ increased time and repeated directions  Pt seated w/ bed alarm intact end of session and  present   Pt continues to be limited due to impaired balance, impaired problem solving, impaired executive functioning, decreased safety awareness, decreased strength, impaired cognition all causing a decline in ADLs, functional transfers and mobility  Recommend STR when medically stable  Will continue to follow  The patient's raw score on the AM-PAC Daily Activity inpatient short form is 18, standardized score is 38 66, less than 39 4  Patients at this level are likely to benefit from discharge to post-acute rehabilitation services  Please refer to the recommendation of the Occupational Therapist for safe discharge planning       OT Discharge Recommendation: Post acute rehabilitation services

## 2022-11-06 NOTE — PROGRESS NOTES
[address] [facility]  Progress Note - Gretchen Simmons 1970, 45 y o  female MRN: [id number]  Unit/Bed#: [facility] 2 -01 Encounter: [id number]  Primary Care Provider: Sasha Yanes MD   Date and time admitted to hospital: 9/19/2022  8:04 AM    * Toxic metabolic encephalopathy  Assessment & Plan  · Felt to be multifactorial due to acute kidney injury, on top morphine and gabapentin use, infection  · Her MRI was noted to have severe periventricular and white matter hyperintensities which will need 3 month follow-up  · Improved  · Overall doing well with virtual one-to-one observation and low bed  · Possible dc to [facility] this week    Right foot pain  Assessment & Plan  · Description, Location and response to stretching of the plantar fascia consistent with plantar fasciitis  · Educated regarding stretching and good footwear  · Supportive care    Tobacco abuse  Assessment & Plan  · Continue Nicotine patch    Depression  Assessment & Plan  Stable  Continue Effexor 187 5 mg daily and Seroquel 50 mg b i d         VTE Pharmacologic Prophylaxis:   Pharmacologic: Enoxaparin (Lovenox)  Mechanical VTE Prophylaxis in Place: No    Patient Centered Rounds: I have performed bedside rounds with nursing staff today  Discussions with Specialists or Other Care Team Provider:  None    Education and Discussions with Family / Patient:  Spoke with  Ricki and gave update    Time Spent for Care: 20 minutes  More than 50% of total time spent on counseling and coordination of care as described above  Current Length of Stay: 48 day(s)    Current Patient Status: Inpatient   Certification Statement: The patient will continue to require additional inpatient hospital stay due to Placement    Discharge Plan:  DC to acute rehab when available    Code Status: Level 1 - Full Code      Subjective:   Seen and examined earlier during rounds    She said she still had right foot pain, worse early in the morning when she 1st steps  Pain improved after passive stretching (dorsiflexion) of the plantar fascia    Objective:     Vitals:   Temp (24hrs), Av 9 °F (36 6 °C), Min:97 2 °F (36 2 °C), Max:98 4 °F (36 9 °C)    Temp:  [97 2 °F (36 2 °C)-98 4 °F (36 9 °C)] 97 2 °F (36 2 °C)  HR:  [83-99] 83  Resp:  [16-20] 20  BP: ()/(66-83) 110/83  SpO2:  [93 %-99 %] 98 %  Body mass index is 18 4 kg/m²  Input and Output Summary (last 24 hours):     No intake or output data in the 24 hours ending 22 1554    Physical Exam:     Physical Exam  Vitals reviewed  Constitutional:       Appearance: She is not ill-appearing or diaphoretic  HENT:      Head: Normocephalic and atraumatic  Nose: No congestion or rhinorrhea  Eyes:      General: No scleral icterus  Cardiovascular:      Rate and Rhythm: Normal rate and regular rhythm  Pulmonary:      Effort: Pulmonary effort is normal  No respiratory distress  Breath sounds: No wheezing or rales  Abdominal:      General: Abdomen is flat  Palpations: Abdomen is soft  Musculoskeletal:      Cervical back: Neck supple  Comments: Diminished muscle mass  No warm, swelling or discoloration of the right foot  Normal range of motion right ankle   Skin:     General: Skin is warm and dry  Coloration: Skin is not jaundiced or pale  Neurological:      Comments: Awake alert follows commands   Psychiatric:         Mood and Affect: Mood normal          Behavior: Behavior normal          * I Have Reviewed All Lab Data Listed Above    * Additional Pertinent Lab Tests Reviewed: No New Labs Available For Today    Imaging:    Imaging Reports Reviewed Today Include:  None    Recent Cultures (last 7 days):           Last 24 Hours Medication List:   Current Facility-Administered Medications   Medication Dose Route Frequency Provider Last Rate   • acetaminophen  650 mg Oral Q6H PRN Lizzeth Singleton DO     • ALPRAZolam  0 5 mg Oral HS PRN Ashtyn Calderon MD     • amLODIPine 10 mg Oral Daily Kady Tran MD     • gabapentin  300 mg Oral 4x Daily Rosey Pink, DO     • heparin (porcine)  5,000 Units Subcutaneous [facility] Debbi Lamasamadeo Adan, DO     • levalbuterol  1 25 mg Nebulization Q4H PRN Sami Hodges, DO     • loperamide  2 mg Oral 4x Daily PRN Kenroy Beverly MD     • metoprolol tartrate  25 mg Oral Q12H [address] Inder Knox MD     • VANDANA ANTIFUNGAL   Topical BID Sami Hodges, DO     • nicotine  1 patch Transdermal Daily Atul Adan, DO     • OLANZapine  5 mg Oral Q6H PRN Sami Hodges DO     • ondansetron  4 mg Intravenous Q4H PRN Kapil Mtz DO     • psyllium  1 packet Oral Daily Rosey Pink, DO     • QUEtiapine  50 mg Oral BID Kenroy Beverly MD     • saccharomyces boulardii  250 mg Oral BID Kenroy Beverly MD     • traMADol  50 mg Oral BID PRN CARLITOS Millan     • venlafaxine  187 5 mg Oral Daily Mecca Anglin DO          Today, Patient Was Seen By: Helga Louis    ** Please Note: Dictation voice to text software may have been used in the creation of this document   **

## 2022-11-06 NOTE — ASSESSMENT & PLAN NOTE
· Felt to be multifactorial due to acute kidney injury, on top morphine and gabapentin use, infection  · Her MRI was noted to have severe periventricular and white matter hyperintensities which will need 3 month follow-up  · Improved  · Overall doing well with virtual one-to-one observation and low bed  · Possible dc to [facility] this week

## 2022-11-06 NOTE — ASSESSMENT & PLAN NOTE
· Description, Location and response to stretching of the plantar fascia consistent with plantar fasciitis  · Educated regarding stretching and good footwear  · Supportive care

## 2022-11-06 NOTE — PLAN OF CARE
Problem: PAIN - ADULT  Goal: Verbalizes/displays adequate comfort level or baseline comfort level  Description: Interventions:  - Encourage patient to monitor pain and request assistance  - Assess pain using appropriate pain scale  - Administer analgesics based on type and severity of pain and evaluate response  - Implement non-pharmacological measures as appropriate and evaluate response  - Consider cultural and social influences on pain and pain management  - Notify physician/advanced practitioner if interventions unsuccessful or patient reports new pain  Outcome: Progressing     Problem: INFECTION - ADULT  Goal: Absence or prevention of progression during hospitalization  Description: INTERVENTIONS:  - Assess and monitor for signs and symptoms of infection  - Monitor lab/diagnostic results  - Monitor all insertion sites, i e  indwelling lines, tubes, and drains  - Monitor endotracheal if appropriate and nasal secretions for changes in amount and color  - [place] appropriate cooling/warming therapies per order  - Administer medications as ordered  - Instruct and encourage patient and family to use good hand hygiene technique  - Identify and instruct in appropriate isolation precautions for identified infection/condition  Outcome: Progressing     Problem: SAFETY ADULT  Goal: Patient will remain free of falls  Description: INTERVENTIONS:  - Educate patient/family on patient safety including physical limitations  - Instruct patient to call for assistance with activity   - Consult OT/PT to assist with strengthening/mobility   - Keep Call bell within reach  - Keep bed low and locked with side rails adjusted as appropriate  - Keep care items and personal belongings within reach  - Initiate and maintain comfort rounds  - Make Fall Risk Sign visible to staff  - Offer Toileting every 2 Hours, in advance of need  - Initiate/Maintain bed alarm  - Obtain necessary fall risk management equipment: walker  - Apply yellow socks and bracelet for high fall risk patients  - Consider moving patient to room near nurses station  Outcome: Progressing  Goal: Maintain or return to baseline ADL function  Description: INTERVENTIONS:  -  Assess patient's ability to carry out ADLs; assess patient's baseline for ADL function and identify physical deficits which impact ability to perform ADLs (bathing, care of mouth/teeth, toileting, grooming, dressing, etc )  - Assess/evaluate cause of self-care deficits   - Assess range of motion  - Assess patient's mobility; develop plan if impaired  - Assess patient's need for assistive devices and provide as appropriate  - Encourage maximum independence but intervene and supervise when necessary  - Involve family in performance of ADLs  - Assess for home care needs following discharge   - Consider OT consult to assist with ADL evaluation and planning for discharge  - Provide patient education as appropriate  Outcome: Progressing  Goal: Maintains/Returns to pre admission functional level  Description: INTERVENTIONS:  - Perform BMAT or MOVE assessment daily    - Set and communicate daily mobility goal to care team and patient/family/caregiver  - Collaborate with rehabilitation services on mobility goals if consulted  - Perform Range of Motion 4-6 times a day  - Reposition patient every 2 hours    - Dangle patient 3-4 times a day  - Stand patient 3 times a day  - Ambulate patient 3-4 times a day  - Out of bed for toileting  - Record patient progress and toleration of activity level   Outcome: Progressing     Problem: DISCHARGE PLANNING  Goal: Discharge to home or other facility with appropriate resources  Description: INTERVENTIONS:  - Identify barriers to discharge w/patient and caregiver  - Arrange for needed discharge resources and transportation as appropriate  - Identify discharge learning needs (meds, wound care, etc )  - Arrange for interpretive services to assist at discharge as needed  - Refer to Case Management Department for coordinating discharge planning if the patient needs post-hospital services based on physician/advanced practitioner order or complex needs related to functional status, cognitive ability, or social support system  Outcome: Progressing     Problem: Knowledge Deficit  Goal: Patient/family/caregiver demonstrates understanding of disease process, treatment plan, medications, and discharge instructions  Description: Complete learning assessment and assess knowledge base    Interventions:  - Provide teaching at level of understanding  - Provide teaching via preferred learning methods  Outcome: Progressing     Problem: Potential for Falls  Goal: Patient will remain free of falls  Description: INTERVENTIONS:  - Educate patient/family on patient safety including physical limitations  - Instruct patient to call for assistance with activity   - Consult OT/PT to assist with strengthening/mobility   - Keep Call bell within reach  - Keep bed low and locked with side rails adjusted as appropriate  - Keep care items and personal belongings within reach  - Initiate and maintain comfort rounds  - Make Fall Risk Sign visible to staff  - Offer Toileting every 2 Hours, in advance of need  - Initiate/Maintain bed alarm  - Obtain necessary fall risk management equipment: walker  - Apply yellow socks and bracelet for high fall risk patients  - Consider moving patient to room near nurses station  Outcome: Progressing     Problem: MOBILITY - ADULT  Goal: Maintain or return to baseline ADL function  Description: INTERVENTIONS:  -  Assess patient's ability to carry out ADLs; assess patient's baseline for ADL function and identify physical deficits which impact ability to perform ADLs (bathing, care of mouth/teeth, toileting, grooming, dressing, etc )  - Assess/evaluate cause of self-care deficits   - Assess range of motion  - Assess patient's mobility; develop plan if impaired  - Assess patient's need for assistive devices and provide as appropriate  - Encourage maximum independence but intervene and supervise when necessary  - Involve family in performance of ADLs  - Assess for home care needs following discharge   - Consider OT consult to assist with ADL evaluation and planning for discharge  - Provide patient education as appropriate  Outcome: Progressing  Goal: Maintains/Returns to pre admission functional level  Description: INTERVENTIONS:  - Perform BMAT or MOVE assessment daily    - Set and communicate daily mobility goal to care team and patient/family/caregiver  - Collaborate with rehabilitation services on mobility goals if consulted  - Perform Range of Motion 4-6 times a day  - Reposition patient every 2 hours  - Dangle patient 3-4 times a day  - Stand patient 3 times a day  - Ambulate patient 3-4 times a day  - Out of bed for toileting  - Record patient progress and toleration of activity level   Outcome: Progressing     Problem: Prexisting or High Potential for Compromised Skin Integrity  Goal: Skin integrity is maintained or improved  Description: INTERVENTIONS:  - Identify patients at risk for skin breakdown  - Assess and monitor skin integrity  - Assess and monitor nutrition and hydration status  - Monitor labs   - Assess for incontinence   - Turn and reposition patient  - Assist with mobility/ambulation  - Relieve pressure over bony prominences  - Avoid friction and shearing  - Provide appropriate hygiene as needed including keeping skin clean and dry  - Evaluate need for skin moisturizer/barrier cream  - Collaborate with interdisciplinary team   - Patient/family teaching  - Consider wound care consult   Outcome: Progressing     Problem: Nutrition/Hydration-ADULT  Goal: Nutrient/Hydration intake appropriate for improving, restoring or maintaining nutritional needs  Description: Monitor and assess patient's nutrition/hydration status for malnutrition   Collaborate with interdisciplinary team and initiate plan and interventions as ordered  Monitor patient's weight and dietary intake as ordered or per policy  Utilize nutrition screening tool and intervene as necessary  Determine patient's food preferences and provide high-protein, high-caloric foods as appropriate  INTERVENTIONS:  - Monitor oral intake, urinary output, labs, and treatment plans  - Assess nutrition and hydration status and recommend course of action  - Evaluate amount of meals eaten  - Assist patient with eating if necessary   - Allow adequate time for meals  - Recommend/ encourage appropriate diets, oral nutritional supplements, and vitamin/mineral supplements  - Order, calculate, and assess calorie counts as needed  - Recommend, monitor, and adjust tube feedings and TPN/PPN based on assessed needs  - Assess need for intravenous fluids  - Provide specific nutrition/hydration education as appropriate  - Include patient/family/caregiver in decisions related to nutrition  Outcome: Progressing     Problem: NEUROSENSORY - ADULT  Goal: Achieves maximal functionality and self care  Description: INTERVENTIONS  - Monitor swallowing and airway patency with patient fatigue and changes in neurological status  - Encourage and assist patient to increase activity and self care     - Encourage visually impaired, hearing impaired and aphasic patients to use assistive/communication devices  Outcome: Progressing  Goal: Achieves stable or improved neurological status  Description: INTERVENTIONS  - Monitor and report changes in neurological status  - Monitor vital signs such as temperature, blood pressure, glucose, and any other labs ordered   - Initiate measures to prevent increased intracranial pressure  - Monitor for seizure activity and implement precautions if appropriate      Outcome: Progressing     Problem: CARDIOVASCULAR - ADULT  Goal: Maintains optimal cardiac output and hemodynamic stability  Description: INTERVENTIONS:  - Monitor I/O, vital signs and rhythm  - Monitor for S/S and trends of decreased cardiac output  - Administer and titrate ordered vasoactive medications to optimize hemodynamic stability  - Assess quality of pulses, skin color and temperature  - Assess for signs of decreased coronary artery perfusion  - Instruct patient to report change in severity of symptoms  Outcome: Progressing     Problem: RESPIRATORY - ADULT  Goal: Achieves optimal ventilation and oxygenation  Description: INTERVENTIONS:  - Assess for changes in respiratory status  - Assess for changes in mentation and behavior  - Position to facilitate oxygenation and minimize respiratory effort  - Oxygen administered by appropriate delivery if ordered  - Initiate smoking cessation education as indicated  - Encourage broncho-pulmonary hygiene including cough, deep breathe, Incentive Spirometry  - Assess the need for suctioning and aspirate as needed  - Assess and instruct to report SOB or any respiratory difficulty  - Respiratory Therapy support as indicated  Outcome: Progressing     Problem: GASTROINTESTINAL - ADULT  Goal: Maintains adequate nutritional intake  Description: INTERVENTIONS:  - Monitor percentage of each meal consumed  - Identify factors contributing to decreased intake, treat as appropriate  - Assist with meals as needed  - Monitor I&O, weight, and lab values if indicated  - Obtain nutrition services referral as needed  Outcome: Progressing     Problem: METABOLIC, FLUID AND ELECTROLYTES - ADULT  Goal: Electrolytes maintained within normal limits  Description: INTERVENTIONS:  - Monitor labs and assess patient for signs and symptoms of electrolyte imbalances  - Administer electrolyte replacement as ordered  - Monitor response to electrolyte replacements, including repeat lab results as appropriate  - Instruct patient on fluid and nutrition as appropriate  Outcome: Progressing  Goal: Fluid balance maintained  Description: INTERVENTIONS:  - Monitor labs   - Monitor I/O and WT  - Instruct patient on fluid and nutrition as appropriate  - Assess for signs & symptoms of volume excess or deficit  Outcome: Progressing     Problem: SKIN/TISSUE INTEGRITY - ADULT  Goal: Skin Integrity remains intact(Skin Breakdown Prevention)  Description: Assess:  -Perform Erickson assessment every shift  -Clean and moisturize skin every day  -Inspect skin when repositioning, toileting, and assisting with ADLS  -Assess extremities for adequate circulation and sensation     Bed Management:  -Have minimal linens on bed & keep smooth, unwrinkled  -Change linens as needed when moist or perspiring  -Avoid sitting or lying in one position for more than 2 hours while in bed  -Keep HOB at 45 degrees     Toileting:  -Offer bedside commode  -Assess for incontinence every 2 hours  -Use incontinent care products after each incontinent episode such as brief    Activity:  -Mobilize patient 3-4 times a day  -Encourage activity and walks on unit  -Encourage or provide ROM exercises   -Turn and reposition patient every 2 Hours  -Use appropriate equipment to lift or move patient in bed  -Instruct/ Assist with weight shifting every 2 when out of bed in chair  -Consider limitation of chair time 2 hour intervals    Skin Care:  -Avoid use of baby powder, tape, friction and shearing, hot water or constrictive clothing  -Relieve pressure over bony prominences using pillows  -Do not massage red bony areas    Next Steps:  -Teach patient strategies to minimize risks such as walker  -Consider consults to  interdisciplinary teams such as wound  Outcome: Progressing  Goal: Incision(s), wounds(s) or drain site(s) healing without S/S of infection  Description: INTERVENTIONS  - Assess and document dressing, incision, wound bed, drain sites and surrounding tissue  - Provide patient and family education  - Perform skin care/dressing changes every PRN  Outcome: Progressing     Problem: MUSCULOSKELETAL - ADULT  Goal: Maintain or return mobility to safest level of function  Description: INTERVENTIONS:  - Assess patient's ability to carry out ADLs; assess patient's baseline for ADL function and identify physical deficits which impact ability to perform ADLs (bathing, care of mouth/teeth, toileting, grooming, dressing, etc )  - Assess/evaluate cause of self-care deficits   - Assess range of motion  - Assess patient's mobility  - Assess patient's need for assistive devices and provide as appropriate  - Encourage maximum independence but intervene and supervise when necessary  - Involve family in performance of ADLs  - Assess for home care needs following discharge   - Consider OT consult to assist with ADL evaluation and planning for discharge  - Provide patient education as appropriate  Outcome: Progressing

## 2022-11-07 VITALS
HEART RATE: 100 BPM | HEIGHT: 66 IN | RESPIRATION RATE: 16 BRPM | BODY MASS INDEX: 18.32 KG/M2 | DIASTOLIC BLOOD PRESSURE: 77 MMHG | TEMPERATURE: 97.8 F | WEIGHT: 113.98 LBS | SYSTOLIC BLOOD PRESSURE: 112 MMHG | OXYGEN SATURATION: 98 %

## 2022-11-07 LAB — SARS-COV-2 RNA RESP QL NAA+PROBE: NEGATIVE

## 2022-11-07 RX ORDER — ALPRAZOLAM 0.5 MG/1
0.5 TABLET ORAL
Qty: 7 TABLET | Refills: 0 | Status: SHIPPED | OUTPATIENT
Start: 2022-11-07 | End: 2022-11-14

## 2022-11-07 RX ORDER — NICOTINE 21 MG/24HR
1 PATCH, TRANSDERMAL 24 HOURS TRANSDERMAL DAILY
Qty: 28 PATCH | Refills: 0 | Status: SHIPPED | OUTPATIENT
Start: 2022-11-08

## 2022-11-07 RX ORDER — TRAMADOL HYDROCHLORIDE 50 MG/1
50 TABLET ORAL 2 TIMES DAILY PRN
Qty: 14 TABLET | Refills: 0 | Status: SHIPPED | OUTPATIENT
Start: 2022-11-07 | End: 2022-11-14

## 2022-11-07 RX ORDER — SACCHAROMYCES BOULARDII 250 MG
250 CAPSULE ORAL 2 TIMES DAILY
Qty: 60 CAPSULE | Refills: 0 | Status: SHIPPED | OUTPATIENT
Start: 2022-11-07 | End: 2022-12-07

## 2022-11-07 RX ORDER — AMLODIPINE BESYLATE 10 MG/1
10 TABLET ORAL DAILY
Qty: 30 TABLET | Refills: 0 | Status: SHIPPED | OUTPATIENT
Start: 2022-11-08 | End: 2022-12-08

## 2022-11-07 RX ORDER — QUETIAPINE FUMARATE 50 MG/1
50 TABLET, FILM COATED ORAL 2 TIMES DAILY
Qty: 60 TABLET | Refills: 0 | Status: SHIPPED | OUTPATIENT
Start: 2022-11-07 | End: 2022-12-07

## 2022-11-07 RX ORDER — VENLAFAXINE HYDROCHLORIDE 37.5 MG/1
187.5 CAPSULE, EXTENDED RELEASE ORAL DAILY
Qty: 150 CAPSULE | Refills: 0 | Status: SHIPPED | OUTPATIENT
Start: 2022-11-08 | End: 2022-12-08

## 2022-11-07 RX ADMIN — GABAPENTIN 300 MG: 300 CAPSULE ORAL at 08:01

## 2022-11-07 RX ADMIN — TRAMADOL HYDROCHLORIDE 50 MG: 50 TABLET, COATED ORAL at 07:48

## 2022-11-07 RX ADMIN — VENLAFAXINE HYDROCHLORIDE 187.5 MG: 37.5 CAPSULE, EXTENDED RELEASE ORAL at 08:00

## 2022-11-07 RX ADMIN — HEPARIN SODIUM 5000 UNITS: 5000 INJECTION INTRAVENOUS; SUBCUTANEOUS at 14:32

## 2022-11-07 RX ADMIN — HEPARIN SODIUM 5000 UNITS: 5000 INJECTION INTRAVENOUS; SUBCUTANEOUS at 05:23

## 2022-11-07 RX ADMIN — ACETAMINOPHEN 650 MG: 325 TABLET, FILM COATED ORAL at 02:13

## 2022-11-07 RX ADMIN — QUETIAPINE FUMARATE 50 MG: 25 TABLET ORAL at 08:01

## 2022-11-07 RX ADMIN — Medication 250 MG: at 08:01

## 2022-11-07 RX ADMIN — GABAPENTIN 300 MG: 300 CAPSULE ORAL at 12:20

## 2022-11-07 RX ADMIN — AMLODIPINE BESYLATE 10 MG: 10 TABLET ORAL at 08:01

## 2022-11-07 RX ADMIN — METOPROLOL TARTRATE 25 MG: 25 TABLET, FILM COATED ORAL at 08:00

## 2022-11-07 RX ADMIN — Medication 1 PATCH: at 08:01

## 2022-11-07 NOTE — CASE MANAGEMENT
Case Management Discharge Planning Note    Patient name Glorious Roles  Location [facility] /[facility] 2 Gurpreet Bell* MRN [id number]  : 1970 Date 2022       Current Admission Date: 2022  Current Admission Diagnosis:Toxic metabolic encephalopathy   Patient Active Problem List    Diagnosis Date Noted   • Right foot pain 2022   • Ambulatory dysfunction 2022   • Moderate protein-calorie malnutrition ([address] ) 10/25/2022   • Mild protein-calorie malnutrition ([address] ) 10/09/2022   • Diarrhea 10/03/2022   • Hypokalemia 10/02/2022   • Hypernatremia 10/02/2022   • Aspiration pneumonitis ([address] ) 10/01/2022   • Pulmonary edema 2022   • Bacteremia due to methicillin susceptible Staphylococcus aureus (MSSA) 2022   • Localized swelling of right upper extremity 2022   • Leg edema, right 2022   • D-dimer, elevated 2022   • ARF (acute renal failure) ([address] ) 2022   • Transaminitis 2022   • Toxic metabolic encephalopathy    • Abnormal CT of the chest 2022   • Traumatic rhabdomyolysis ([address] ) 2022   • Hyperglycemia 2022   • Opioid dependence ([address] ) 2021   • Chronic right shoulder pain 2021   • Internal hemorrhoids 2020   • Adnexal cyst 2020   • Ischemic colitis ([address] ) 2020   • Intercostal neuralgia    • Hematochezia 2019   • Insomnia 2019   • History of rib fracture 10/28/2018   • Tobacco abuse 10/28/2018   • Right knee pain 2018   • Generalized anxiety disorder 2018   • Hypertension    • Hyperlipidemia    • Depression    • COPD (chronic obstructive pulmonary disease) ([address] )    • Chondromalacia patellae 2018   • Irritable bowel syndrome with diarrhea 2018   • DDD (degenerative disc disease), lumbosacral 2014      LOS (days): 49  Geometric Mean LOS (GMLOS) (days):   Days to GMLOS:     OBJECTIVE:  Risk of Unplanned Readmission Score: 28 21         Current admission status: Inpatient Preferred Pharmacy:   [facility] [address], [address]   [facility] [address]  Phone: [phone] Fax: [address] [facility]  [address] [facility]  [place] [address]  Phone: [phone] Fax: [phone]    Primary Care Provider: Tete Byrne MD    Primary Insurance: [address]  Secondary Insurance:     DISCHARGE DETAILS:       Treatment Team Recommendation: Acute Rehab  Discharge Destination Plan[de-identified] Acute Rehab  Transport at Discharge : [facility]  Dispatcher Contacted: Yes  Number/Name of Dispatcher: [phone]/Enrique  Transported by [facility] and Unit #): Karl Vasquez  ETA of Transport (Date): 11/07/22  ETA of Transport (Time): [address] [place] [address] Street Name, [address] : [facility]  Receiving Facility/Agency Phone Number: [phone]  Facility/Agency Fax Number: [phone]     Additional Comments:   Pt is medically stable for discharge  CM received notification from Asha Johnson with [facility], authorization was received and bed is available today  CM scheduled transport through [address] for today at 1500 with [employer]  PCS completed and placed in pt binder for transport  The following were notified: pt, pt's spouse, SNF, MD and RN  COVID test completed and is negative

## 2022-11-07 NOTE — PLAN OF CARE
Problem: PAIN - ADULT  Goal: Verbalizes/displays adequate comfort level or baseline comfort level  Description: Interventions:  - Encourage patient to monitor pain and request assistance  - Assess pain using appropriate pain scale  - Administer analgesics based on type and severity of pain and evaluate response  - Implement non-pharmacological measures as appropriate and evaluate response  - Consider cultural and social influences on pain and pain management  - Notify physician/advanced practitioner if interventions unsuccessful or patient reports new pain  Outcome: Progressing     Problem: INFECTION - ADULT  Goal: Absence or prevention of progression during hospitalization  Description: INTERVENTIONS:  - Assess and monitor for signs and symptoms of infection  - Monitor lab/diagnostic results  - Monitor all insertion sites, i e  indwelling lines, tubes, and drains  - Monitor endotracheal if appropriate and nasal secretions for changes in amount and color  - [place] appropriate cooling/warming therapies per order  - Administer medications as ordered  - Instruct and encourage patient and family to use good hand hygiene technique  - Identify and instruct in appropriate isolation precautions for identified infection/condition  Outcome: Progressing     Problem: SAFETY ADULT  Goal: Patient will remain free of falls  Description: INTERVENTIONS:  - Educate patient/family on patient safety including physical limitations  - Instruct patient to call for assistance with activity   - Consult OT/PT to assist with strengthening/mobility   - Keep Call bell within reach  - Keep bed low and locked with side rails adjusted as appropriate  - Keep care items and personal belongings within reach  - Initiate and maintain comfort rounds  - Make Fall Risk Sign visible to staff  - Offer Toileting every 2 Hours, in advance of need  - Initiate/Maintain bed alarm  - Obtain necessary fall risk management equipment: walker  - Apply yellow socks and bracelet for high fall risk patients  - Consider moving patient to room near nurses station  Outcome: Progressing  Goal: Maintain or return to baseline ADL function  Description: INTERVENTIONS:  -  Assess patient's ability to carry out ADLs; assess patient's baseline for ADL function and identify physical deficits which impact ability to perform ADLs (bathing, care of mouth/teeth, toileting, grooming, dressing, etc )  - Assess/evaluate cause of self-care deficits   - Assess range of motion  - Assess patient's mobility; develop plan if impaired  - Assess patient's need for assistive devices and provide as appropriate  - Encourage maximum independence but intervene and supervise when necessary  - Involve family in performance of ADLs  - Assess for home care needs following discharge   - Consider OT consult to assist with ADL evaluation and planning for discharge  - Provide patient education as appropriate  Outcome: Progressing  Goal: Maintains/Returns to pre admission functional level  Description: INTERVENTIONS:  - Perform BMAT or MOVE assessment daily    - Set and communicate daily mobility goal to care team and patient/family/caregiver  - Collaborate with rehabilitation services on mobility goals if consulted  - Perform Range of Motion 4-6 times a day  - Reposition patient every 2 hours    - Dangle patient 3-4 times a day  - Stand patient 3 times a day  - Ambulate patient 3-4 times a day  - Out of bed for toileting  - Record patient progress and toleration of activity level   Outcome: Progressing     Problem: DISCHARGE PLANNING  Goal: Discharge to home or other facility with appropriate resources  Description: INTERVENTIONS:  - Identify barriers to discharge w/patient and caregiver  - Arrange for needed discharge resources and transportation as appropriate  - Identify discharge learning needs (meds, wound care, etc )  - Arrange for interpretive services to assist at discharge as needed  - Refer to Case Management Department for coordinating discharge planning if the patient needs post-hospital services based on physician/advanced practitioner order or complex needs related to functional status, cognitive ability, or social support system  Outcome: Progressing     Problem: Knowledge Deficit  Goal: Patient/family/caregiver demonstrates understanding of disease process, treatment plan, medications, and discharge instructions  Description: Complete learning assessment and assess knowledge base    Interventions:  - Provide teaching at level of understanding  - Provide teaching via preferred learning methods  Outcome: Progressing     Problem: Potential for Falls  Goal: Patient will remain free of falls  Description: INTERVENTIONS:  - Educate patient/family on patient safety including physical limitations  - Instruct patient to call for assistance with activity   - Consult OT/PT to assist with strengthening/mobility   - Keep Call bell within reach  - Keep bed low and locked with side rails adjusted as appropriate  - Keep care items and personal belongings within reach  - Initiate and maintain comfort rounds  - Make Fall Risk Sign visible to staff  - Offer Toileting every 2 Hours, in advance of need  - Initiate/Maintain bed alarm  - Obtain necessary fall risk management equipment: walker  - Apply yellow socks and bracelet for high fall risk patients  - Consider moving patient to room near nurses station  Outcome: Progressing     Problem: MOBILITY - ADULT  Goal: Maintain or return to baseline ADL function  Description: INTERVENTIONS:  -  Assess patient's ability to carry out ADLs; assess patient's baseline for ADL function and identify physical deficits which impact ability to perform ADLs (bathing, care of mouth/teeth, toileting, grooming, dressing, etc )  - Assess/evaluate cause of self-care deficits   - Assess range of motion  - Assess patient's mobility; develop plan if impaired  - Assess patient's need for assistive devices and provide as appropriate  - Encourage maximum independence but intervene and supervise when necessary  - Involve family in performance of ADLs  - Assess for home care needs following discharge   - Consider OT consult to assist with ADL evaluation and planning for discharge  - Provide patient education as appropriate  Outcome: Progressing  Goal: Maintains/Returns to pre admission functional level  Description: INTERVENTIONS:  - Perform BMAT or MOVE assessment daily    - Set and communicate daily mobility goal to care team and patient/family/caregiver  - Collaborate with rehabilitation services on mobility goals if consulted  - Perform Range of Motion 4-6 times a day  - Reposition patient every 2 hours  - Dangle patient 3-4 times a day  - Stand patient 3 times a day  - Ambulate patient 3-4 times a day  - Out of bed for toileting  - Record patient progress and toleration of activity level   Outcome: Progressing     Problem: Prexisting or High Potential for Compromised Skin Integrity  Goal: Skin integrity is maintained or improved  Description: INTERVENTIONS:  - Identify patients at risk for skin breakdown  - Assess and monitor skin integrity  - Assess and monitor nutrition and hydration status  - Monitor labs   - Assess for incontinence   - Turn and reposition patient  - Assist with mobility/ambulation  - Relieve pressure over bony prominences  - Avoid friction and shearing  - Provide appropriate hygiene as needed including keeping skin clean and dry  - Evaluate need for skin moisturizer/barrier cream  - Collaborate with interdisciplinary team   - Patient/family teaching  - Consider wound care consult   Outcome: Progressing     Problem: Nutrition/Hydration-ADULT  Goal: Nutrient/Hydration intake appropriate for improving, restoring or maintaining nutritional needs  Description: Monitor and assess patient's nutrition/hydration status for malnutrition   Collaborate with interdisciplinary team and initiate plan and interventions as ordered  Monitor patient's weight and dietary intake as ordered or per policy  Utilize nutrition screening tool and intervene as necessary  Determine patient's food preferences and provide high-protein, high-caloric foods as appropriate  INTERVENTIONS:  - Monitor oral intake, urinary output, labs, and treatment plans  - Assess nutrition and hydration status and recommend course of action  - Evaluate amount of meals eaten  - Assist patient with eating if necessary   - Allow adequate time for meals  - Recommend/ encourage appropriate diets, oral nutritional supplements, and vitamin/mineral supplements  - Order, calculate, and assess calorie counts as needed  - Recommend, monitor, and adjust tube feedings and TPN/PPN based on assessed needs  - Assess need for intravenous fluids  - Provide specific nutrition/hydration education as appropriate  - Include patient/family/caregiver in decisions related to nutrition  Outcome: Progressing     Problem: NEUROSENSORY - ADULT  Goal: Achieves maximal functionality and self care  Description: INTERVENTIONS  - Monitor swallowing and airway patency with patient fatigue and changes in neurological status  - Encourage and assist patient to increase activity and self care     - Encourage visually impaired, hearing impaired and aphasic patients to use assistive/communication devices  Outcome: Progressing  Goal: Achieves stable or improved neurological status  Description: INTERVENTIONS  - Monitor and report changes in neurological status  - Monitor vital signs such as temperature, blood pressure, glucose, and any other labs ordered   - Initiate measures to prevent increased intracranial pressure  - Monitor for seizure activity and implement precautions if appropriate      Outcome: Progressing     Problem: CARDIOVASCULAR - ADULT  Goal: Maintains optimal cardiac output and hemodynamic stability  Description: INTERVENTIONS:  - Monitor I/O, vital signs and rhythm  - Monitor for S/S and trends of decreased cardiac output  - Administer and titrate ordered vasoactive medications to optimize hemodynamic stability  - Assess quality of pulses, skin color and temperature  - Assess for signs of decreased coronary artery perfusion  - Instruct patient to report change in severity of symptoms  Outcome: Progressing     Problem: RESPIRATORY - ADULT  Goal: Achieves optimal ventilation and oxygenation  Description: INTERVENTIONS:  - Assess for changes in respiratory status  - Assess for changes in mentation and behavior  - Position to facilitate oxygenation and minimize respiratory effort  - Oxygen administered by appropriate delivery if ordered  - Initiate smoking cessation education as indicated  - Encourage broncho-pulmonary hygiene including cough, deep breathe, Incentive Spirometry  - Assess the need for suctioning and aspirate as needed  - Assess and instruct to report SOB or any respiratory difficulty  - Respiratory Therapy support as indicated  Outcome: Progressing     Problem: GASTROINTESTINAL - ADULT  Goal: Maintains adequate nutritional intake  Description: INTERVENTIONS:  - Monitor percentage of each meal consumed  - Identify factors contributing to decreased intake, treat as appropriate  - Assist with meals as needed  - Monitor I&O, weight, and lab values if indicated  - Obtain nutrition services referral as needed  Outcome: Progressing     Problem: METABOLIC, FLUID AND ELECTROLYTES - ADULT  Goal: Electrolytes maintained within normal limits  Description: INTERVENTIONS:  - Monitor labs and assess patient for signs and symptoms of electrolyte imbalances  - Administer electrolyte replacement as ordered  - Monitor response to electrolyte replacements, including repeat lab results as appropriate  - Instruct patient on fluid and nutrition as appropriate  Outcome: Progressing  Goal: Fluid balance maintained  Description: INTERVENTIONS:  - Monitor labs   - Monitor I/O and WT  - Instruct patient on fluid and nutrition as appropriate  - Assess for signs & symptoms of volume excess or deficit  Outcome: Progressing     Problem: SKIN/TISSUE INTEGRITY - ADULT  Goal: Skin Integrity remains intact(Skin Breakdown Prevention)  Description: Assess:  -Perform Erickson assessment every shift  -Clean and moisturize skin every day  -Inspect skin when repositioning, toileting, and assisting with ADLS  -Assess extremities for adequate circulation and sensation     Bed Management:  -Have minimal linens on bed & keep smooth, unwrinkled  -Change linens as needed when moist or perspiring  -Avoid sitting or lying in one position for more than 2 hours while in bed  -Keep HOB at 45 degrees     Toileting:  -Offer bedside commode  -Assess for incontinence every 2 hours  -Use incontinent care products after each incontinent episode such as brief    Activity:  -Mobilize patient 3-4 times a day  -Encourage activity and walks on unit  -Encourage or provide ROM exercises   -Turn and reposition patient every 2 Hours  -Use appropriate equipment to lift or move patient in bed  -Instruct/ Assist with weight shifting every 2 when out of bed in chair  -Consider limitation of chair time 2 hour intervals    Skin Care:  -Avoid use of baby powder, tape, friction and shearing, hot water or constrictive clothing  -Relieve pressure over bony prominences using pillows  -Do not massage red bony areas    Next Steps:  -Teach patient strategies to minimize risks such as walker  -Consider consults to  interdisciplinary teams such as wound  Outcome: Progressing  Goal: Incision(s), wounds(s) or drain site(s) healing without S/S of infection  Description: INTERVENTIONS  - Assess and document dressing, incision, wound bed, drain sites and surrounding tissue  - Provide patient and family education  - Perform skin care/dressing changes every PRN  Outcome: Progressing     Problem: MUSCULOSKELETAL - ADULT  Goal: Maintain or return mobility to safest level of function  Description: INTERVENTIONS:  - Assess patient's ability to carry out ADLs; assess patient's baseline for ADL function and identify physical deficits which impact ability to perform ADLs (bathing, care of mouth/teeth, toileting, grooming, dressing, etc )  - Assess/evaluate cause of self-care deficits   - Assess range of motion  - Assess patient's mobility  - Assess patient's need for assistive devices and provide as appropriate  - Encourage maximum independence but intervene and supervise when necessary  - Involve family in performance of ADLs  - Assess for home care needs following discharge   - Consider OT consult to assist with ADL evaluation and planning for discharge  - Provide patient education as appropriate  Outcome: Progressing

## 2022-11-07 NOTE — ASSESSMENT & PLAN NOTE
· Lumbar radiculopathy with previously scheduled surgery now on hold  Previous attending discussed case with patient's outpatient neurosurgeon  Initial surgery was canceled prior to hospitalization  It was decided that patient will need a much larger procedure with multilevel fusion and scoliosis correction  They discussed that surgery would be high risk given prolonged anesthesia time and comorbidities      · Prior to admission was on gabapentin and morphine IR 15 mg  · Continue gabapentin, she was weaned off morphine  · Outpatient follow-up with neuro surgery

## 2022-11-07 NOTE — ASSESSMENT & PLAN NOTE
· Patient with significant wheezing as well as new right lower lobe opacity seen on imaging study   Likely aspiration pneumonia/pneumonitis in the setting of encephalopathy  · Resolved, patient tolerating regular diet

## 2022-11-07 NOTE — ASSESSMENT & PLAN NOTE
· CT of the chest on 10/01 showed bilateral opacities and pleural effusion  · Repeat chest x-ray on 10/5 showed resolution of the pulmonary edema and effusion

## 2022-11-07 NOTE — PLAN OF CARE
Problem: PAIN - ADULT  Goal: Verbalizes/displays adequate comfort level or baseline comfort level  Description: Interventions:  - Encourage patient to monitor pain and request assistance  - Assess pain using appropriate pain scale  - Administer analgesics based on type and severity of pain and evaluate response  - Implement non-pharmacological measures as appropriate and evaluate response  - Consider cultural and social influences on pain and pain management  - Notify physician/advanced practitioner if interventions unsuccessful or patient reports new pain  Outcome: Adequate for Discharge     Problem: INFECTION - ADULT  Goal: Absence or prevention of progression during hospitalization  Description: INTERVENTIONS:  - Assess and monitor for signs and symptoms of infection  - Monitor lab/diagnostic results  - Monitor all insertion sites, i e  indwelling lines, tubes, and drains  - Monitor endotracheal if appropriate and nasal secretions for changes in amount and color  - [place] appropriate cooling/warming therapies per order  - Administer medications as ordered  - Instruct and encourage patient and family to use good hand hygiene technique  - Identify and instruct in appropriate isolation precautions for identified infection/condition  Outcome: Adequate for Discharge     Problem: SAFETY ADULT  Goal: Patient will remain free of falls  Description: INTERVENTIONS:  - Educate patient/family on patient safety including physical limitations  - Instruct patient to call for assistance with activity   - Consult OT/PT to assist with strengthening/mobility   - Keep Call bell within reach  - Keep bed low and locked with side rails adjusted as appropriate  - Keep care items and personal belongings within reach  - Initiate and maintain comfort rounds  - Make Fall Risk Sign visible to staff  - Offer Toileting every 2 Hours, in advance of need  - Initiate/Maintain bed alarm  - Obtain necessary fall risk management equipment: walker  - Apply yellow socks and bracelet for high fall risk patients  - Consider moving patient to room near nurses station  Outcome: Adequate for Discharge  Goal: Maintain or return to baseline ADL function  Description: INTERVENTIONS:  -  Assess patient's ability to carry out ADLs; assess patient's baseline for ADL function and identify physical deficits which impact ability to perform ADLs (bathing, care of mouth/teeth, toileting, grooming, dressing, etc )  - Assess/evaluate cause of self-care deficits   - Assess range of motion  - Assess patient's mobility; develop plan if impaired  - Assess patient's need for assistive devices and provide as appropriate  - Encourage maximum independence but intervene and supervise when necessary  - Involve family in performance of ADLs  - Assess for home care needs following discharge   - Consider OT consult to assist with ADL evaluation and planning for discharge  - Provide patient education as appropriate  Outcome: Adequate for Discharge  Goal: Maintains/Returns to pre admission functional level  Description: INTERVENTIONS:  - Perform BMAT or MOVE assessment daily    - Set and communicate daily mobility goal to care team and patient/family/caregiver  - Collaborate with rehabilitation services on mobility goals if consulted  - Perform Range of Motion 4-6 times a day  - Reposition patient every 2 hours    - Dangle patient 3-4 times a day  - Stand patient 3 times a day  - Ambulate patient 3-4 times a day  - Out of bed for toileting  - Record patient progress and toleration of activity level   Outcome: Adequate for Discharge     Problem: DISCHARGE PLANNING  Goal: Discharge to home or other facility with appropriate resources  Description: INTERVENTIONS:  - Identify barriers to discharge w/patient and caregiver  - Arrange for needed discharge resources and transportation as appropriate  - Identify discharge learning needs (meds, wound care, etc )  - Arrange for interpretive services to assist at discharge as needed  - Refer to Case Management Department for coordinating discharge planning if the patient needs post-hospital services based on physician/advanced practitioner order or complex needs related to functional status, cognitive ability, or social support system  Outcome: Adequate for Discharge     Problem: Knowledge Deficit  Goal: Patient/family/caregiver demonstrates understanding of disease process, treatment plan, medications, and discharge instructions  Description: Complete learning assessment and assess knowledge base    Interventions:  - Provide teaching at level of understanding  - Provide teaching via preferred learning methods  Outcome: Adequate for Discharge     Problem: Potential for Falls  Goal: Patient will remain free of falls  Description: INTERVENTIONS:  - Educate patient/family on patient safety including physical limitations  - Instruct patient to call for assistance with activity   - Consult OT/PT to assist with strengthening/mobility   - Keep Call bell within reach  - Keep bed low and locked with side rails adjusted as appropriate  - Keep care items and personal belongings within reach  - Initiate and maintain comfort rounds  - Make Fall Risk Sign visible to staff  - Offer Toileting every 2 Hours, in advance of need  - Initiate/Maintain bed alarm  - Obtain necessary fall risk management equipment: walker  - Apply yellow socks and bracelet for high fall risk patients  - Consider moving patient to room near nurses station  Outcome: Adequate for Discharge     Problem: MOBILITY - ADULT  Goal: Maintain or return to baseline ADL function  Description: INTERVENTIONS:  -  Assess patient's ability to carry out ADLs; assess patient's baseline for ADL function and identify physical deficits which impact ability to perform ADLs (bathing, care of mouth/teeth, toileting, grooming, dressing, etc )  - Assess/evaluate cause of self-care deficits   - Assess range of motion  - Assess patient's mobility; develop plan if impaired  - Assess patient's need for assistive devices and provide as appropriate  - Encourage maximum independence but intervene and supervise when necessary  - Involve family in performance of ADLs  - Assess for home care needs following discharge   - Consider OT consult to assist with ADL evaluation and planning for discharge  - Provide patient education as appropriate  Outcome: Adequate for Discharge  Goal: Maintains/Returns to pre admission functional level  Description: INTERVENTIONS:  - Perform BMAT or MOVE assessment daily    - Set and communicate daily mobility goal to care team and patient/family/caregiver  - Collaborate with rehabilitation services on mobility goals if consulted  - Perform Range of Motion 4-6 times a day  - Reposition patient every 2 hours  - Dangle patient 3-4 times a day  - Stand patient 3 times a day  - Ambulate patient 3-4 times a day  - Out of bed for toileting  - Record patient progress and toleration of activity level   Outcome: Adequate for Discharge     Problem: Prexisting or High Potential for Compromised Skin Integrity  Goal: Skin integrity is maintained or improved  Description: INTERVENTIONS:  - Identify patients at risk for skin breakdown  - Assess and monitor skin integrity  - Assess and monitor nutrition and hydration status  - Monitor labs   - Assess for incontinence   - Turn and reposition patient  - Assist with mobility/ambulation  - Relieve pressure over bony prominences  - Avoid friction and shearing  - Provide appropriate hygiene as needed including keeping skin clean and dry  - Evaluate need for skin moisturizer/barrier cream  - Collaborate with interdisciplinary team   - Patient/family teaching  - Consider wound care consult   Outcome: Adequate for Discharge     Problem: PHYSICAL THERAPY ADULT  Goal: Performs mobility at highest level of function for planned discharge setting    See evaluation for individualized goals  Description: Treatment/Interventions: Functional transfer training, LE strengthening/ROM, Elevations, Therapeutic exercise, Cognitive reorientation, Patient/family training, Equipment eval/education, Bed mobility, Gait training, Compensatory technique education, Continued evaluation, Spoke to nursing, Spoke to case management, Spoke to MD, OT, ST  Equipment Recommended: Juidt Garcia (if patient does not own)       See flowsheet documentation for full assessment, interventions and recommendations  Outcome: Adequate for Discharge     Problem: OCCUPATIONAL THERAPY ADULT  Goal: Performs self-care activities at highest level of function for planned discharge setting  See evaluation for individualized goals  Description: Treatment Interventions: ADL retraining, Functional transfer training, UE strengthening/ROM, Endurance training, Cognitive reorientation, Patient/family training, Equipment evaluation/education, Compensatory technique education, Energy conservation, Activityengagement          See flowsheet documentation for full assessment, interventions and recommendations  Outcome: Adequate for Discharge     Problem: Nutrition/Hydration-ADULT  Goal: Nutrient/Hydration intake appropriate for improving, restoring or maintaining nutritional needs  Description: Monitor and assess patient's nutrition/hydration status for malnutrition  Collaborate with interdisciplinary team and initiate plan and interventions as ordered  Monitor patient's weight and dietary intake as ordered or per policy  Utilize nutrition screening tool and intervene as necessary  Determine patient's food preferences and provide high-protein, high-caloric foods as appropriate       INTERVENTIONS:  - Monitor oral intake, urinary output, labs, and treatment plans  - Assess nutrition and hydration status and recommend course of action  - Evaluate amount of meals eaten  - Assist patient with eating if necessary   - Allow adequate time for meals  - Recommend/ encourage appropriate diets, oral nutritional supplements, and vitamin/mineral supplements  - Order, calculate, and assess calorie counts as needed  - Recommend, monitor, and adjust tube feedings and TPN/PPN based on assessed needs  - Assess need for intravenous fluids  - Provide specific nutrition/hydration education as appropriate  - Include patient/family/caregiver in decisions related to nutrition  Outcome: Adequate for Discharge     Problem: NEUROSENSORY - ADULT  Goal: Achieves maximal functionality and self care  Description: INTERVENTIONS  - Monitor swallowing and airway patency with patient fatigue and changes in neurological status  - Encourage and assist patient to increase activity and self care     - Encourage visually impaired, hearing impaired and aphasic patients to use assistive/communication devices  Outcome: Adequate for Discharge  Goal: Achieves stable or improved neurological status  Description: INTERVENTIONS  - Monitor and report changes in neurological status  - Monitor vital signs such as temperature, blood pressure, glucose, and any other labs ordered   - Initiate measures to prevent increased intracranial pressure  - Monitor for seizure activity and implement precautions if appropriate      Outcome: Adequate for Discharge     Problem: CARDIOVASCULAR - ADULT  Goal: Maintains optimal cardiac output and hemodynamic stability  Description: INTERVENTIONS:  - Monitor I/O, vital signs and rhythm  - Monitor for S/S and trends of decreased cardiac output  - Administer and titrate ordered vasoactive medications to optimize hemodynamic stability  - Assess quality of pulses, skin color and temperature  - Assess for signs of decreased coronary artery perfusion  - Instruct patient to report change in severity of symptoms  Outcome: Adequate for Discharge     Problem: RESPIRATORY - ADULT  Goal: Achieves optimal ventilation and oxygenation  Description: INTERVENTIONS:  - Assess for changes in respiratory status  - Assess for changes in mentation and behavior  - Position to facilitate oxygenation and minimize respiratory effort  - Oxygen administered by appropriate delivery if ordered  - Initiate smoking cessation education as indicated  - Encourage broncho-pulmonary hygiene including cough, deep breathe, Incentive Spirometry  - Assess the need for suctioning and aspirate as needed  - Assess and instruct to report SOB or any respiratory difficulty  - Respiratory Therapy support as indicated  Outcome: Adequate for Discharge     Problem: GASTROINTESTINAL - ADULT  Goal: Maintains adequate nutritional intake  Description: INTERVENTIONS:  - Monitor percentage of each meal consumed  - Identify factors contributing to decreased intake, treat as appropriate  - Assist with meals as needed  - Monitor I&O, weight, and lab values if indicated  - Obtain nutrition services referral as needed  Outcome: Adequate for Discharge     Problem: METABOLIC, FLUID AND ELECTROLYTES - ADULT  Goal: Electrolytes maintained within normal limits  Description: INTERVENTIONS:  - Monitor labs and assess patient for signs and symptoms of electrolyte imbalances  - Administer electrolyte replacement as ordered  - Monitor response to electrolyte replacements, including repeat lab results as appropriate  - Instruct patient on fluid and nutrition as appropriate  Outcome: Adequate for Discharge  Goal: Fluid balance maintained  Description: INTERVENTIONS:  - Monitor labs   - Monitor I/O and WT  - Instruct patient on fluid and nutrition as appropriate  - Assess for signs & symptoms of volume excess or deficit  Outcome: Adequate for Discharge     Problem: SKIN/TISSUE INTEGRITY - ADULT  Goal: Skin Integrity remains intact(Skin Breakdown Prevention)  Description: Assess:  -Perform Eric[place]on assessment every shift  -Clean and moisturize skin every day  -Inspect skin when repositioning, toileting, and assisting with ADLS  -Assess extremities for adequate circulation and sensation     Bed Management:  -Have minimal linens on bed & keep smooth, unwrinkled  -Change linens as needed when moist or perspiring  -Avoid sitting or lying in one position for more than 2 hours while in bed  -Keep HOB at 45 degrees     Toileting:  -Offer bedside commode  -Assess for incontinence every 2 hours  -Use incontinent care products after each incontinent episode such as brief    Activity:  -Mobilize patient 3-4 times a day  -Encourage activity and walks on unit  -Encourage or provide ROM exercises   -Turn and reposition patient every 2 Hours  -Use appropriate equipment to lift or move patient in bed  -Instruct/ Assist with weight shifting every 2 when out of bed in chair  -Consider limitation of chair time 2 hour intervals    Skin Care:  -Avoid use of baby powder, tape, friction and shearing, hot water or constrictive clothing  -Relieve pressure over bony prominences using pillows  -Do not massage red bony areas    Next Steps:  -Teach patient strategies to minimize risks such as walker  -Consider consults to  interdisciplinary teams such as wound  Outcome: Adequate for Discharge  Goal: Incision(s), wounds(s) or drain site(s) healing without S/S of infection  Description: INTERVENTIONS  - Assess and document dressing, incision, wound bed, drain sites and surrounding tissue  - Provide patient and family education  - Perform skin care/dressing changes every PRN  Outcome: Adequate for Discharge     Problem: MUSCULOSKELETAL - ADULT  Goal: Maintain or return mobility to safest level of function  Description: INTERVENTIONS:  - Assess patient's ability to carry out ADLs; assess patient's baseline for ADL function and identify physical deficits which impact ability to perform ADLs (bathing, care of mouth/teeth, toileting, grooming, dressing, etc )  - Assess/evaluate cause of self-care deficits   - Assess range of motion  - Assess patient's mobility  - Assess patient's need for assistive devices and provide as appropriate  - Encourage maximum independence but intervene and supervise when necessary  - Involve family in performance of ADLs  - Assess for home care needs following discharge   - Consider OT consult to assist with ADL evaluation and planning for discharge  - Provide patient education as appropriate  Outcome: Adequate for Discharge

## 2022-11-07 NOTE — ASSESSMENT & PLAN NOTE
· Felt to be multifactorial due to acute kidney injury, on top morphine and gabapentin use, infection  · Her MRI was noted to have severe periventricular and white matter hyperintensities which will need 3 month follow-up  · Improved  · Will be discharged to [facility]

## 2022-11-07 NOTE — ASSESSMENT & PLAN NOTE
· MSSA bacteremia, likely due to phlebitis  · Resolved  · Echo with no vegetation  · Completed abx 10/26/22   · Repeat blood cultures 2 week from completion of antibiotics per ID recommendation on 11/08 to be done at [facility]

## 2022-11-07 NOTE — ASSESSMENT & PLAN NOTE
Malnutrition Findings:   Adult Malnutrition type: Acute illness  Adult Degree of Malnutrition: Malnutrition of moderate degree  Malnutrition Characteristics: Fat loss, Muscle loss, Inadequate energy, Weight loss                  360 Statement: Acute moderate pro, chel[place] malnutrition d/t condition as evidence by mild muscle and fat loss at temples, orbitals, triceps, <75% energy intake > 7 days, 18 1% wt loss x 1 month, BMI 18 4, currently treated with liberalized oral diet, tried supplements, but PT refused, will continue to monitor for food preferences and additional snacks in between meals  BMI Findings:  Adult BMI Classifications: Underweight < 18 5        Body mass index is 18 4 kg/m²    Mei Sanches is a 56 YO female who is seen in consultation at request of Dr. Arenas for new onset vomiting. The PCP note indicated she had global abdominal pain for one month. Starting after [place], she vomited several days a week for over one month. She denies ill family members, early satiety prior getting ill, or travel. She went to ED in [place] several times and CBC was normal, no anemia. Her abdominal CT was normal. Though we do not have a copy,  reports wife's ultrasound showed sludge. She HIDA was normal. Her EGD showed antritis, small hiatal hernia. She saw surgeons Dr. Castrejon and Dr Barksdale who reported that she did not need a cholecystectomy.  She has not been on a PPI. Vegetable oil and olive oil made her more nauseous. She received Rx for Zofran and Sulcralfate.She is feeling much better and no longer has abdominal pain. She is eating three meals a day, and denies heartburn or h/o GERD. She has a BSS type 1 bowel movement every other day.  She takes iron because she used to be anemic in her youth. Current CBC does not show anemia. She denies rectal bleeding, change in bowel, family h/o colon cancer, diarrhea.  BR

## 2022-11-07 NOTE — DISCHARGE SUMMARY
[facility]  Discharge- [place] Pick[place] 1970, 45 y o  female MRN: [id number]  Unit/Bed#: Metsa 68 2 -01 Encounter: [id number]  Primary Care Provider: Melo Lester MD   Date and time admitted to hospital: 9/19/2022  8:04 AM    * Toxic metabolic encephalopathy  Assessment & Plan  · Felt to be multifactorial due to acute kidney injury, on top morphine and gabapentin use, infection  · Her MRI was noted to have severe periventricular and white matter hyperintensities which will need 3 month follow-up  · Improved  · Will be discharged to [address] [place] [address]    Traumatic rhabdomyolysis [facility])  Assessment & Plan  · Secondary to fall, patient had been laying in bed for approximately 4 days  · She was subsequently brought to the hospital and found to be in acute rhabdomyolysis with ANUJA  · Had been on multiple sedating medications including MS Contin as well as gabapentin, which likely were contributing to sedation  · Now resolved    ARF (acute renal failure) ([facility] )  Assessment & Plan  · ANUJA due to rhabdomyolysis, resolved      Bacteremia due to methicillin susceptible Staphylococcus aureus (MSSA)  Assessment & Plan  · MSSA bacteremia, likely due to phlebitis  · Resolved  · Echo with no vegetation  · Completed abx 10/26/22   · Repeat blood cultures 2 week from completion of antibiotics per ID recommendation on 11/08 to be done at [facility]    Right foot pain  Assessment & Plan  · Description, Location and response to stretching of the plantar fascia consistent with plantar fasciitis  · Educated regarding stretching and good footwear  · Supportive care    Ambulatory dysfunction  Assessment & Plan  · Will be discharged to [facility]    Mild protein-calorie malnutrition ([facility] )  Assessment & Plan  Malnutrition Findings:   Adult Malnutrition type: Acute illness  Adult Degree of Malnutrition: Malnutrition of moderate degree  Malnutrition Characteristics: Fat loss, Muscle loss, Inadequate energy, Weight loss                  360 Statement: Acute moderate pro, chelsey malnutrition d/t condition as evidence by mild muscle and fat loss at temples, orbitals, triceps, <75% energy intake > 7 days, 18 1% wt loss x 1 month, BMI 18 4, currently treated with liberalized oral diet, tried supplements, but PT refused, will continue to monitor for food preferences and additional snacks in between meals  BMI Findings:  Adult BMI Classifications: Underweight < 18 5        Body mass index is 18 4 kg/m²  Diarrhea  Assessment & Plan  · Resolved    Aspiration pneumonitis (HCC)  Assessment & Plan  · Patient with significant wheezing as well as new right lower lobe opacity seen on imaging study  Likely aspiration pneumonia/pneumonitis in the setting of encephalopathy  · Resolved, patient tolerating regular diet    Abnormal CT of the chest  Assessment & Plan  · CT of the chest on 10/01 showed bilateral opacities and pleural effusion  · Repeat chest x-ray on 10/5 showed resolution of the pulmonary edema and effusion      Transaminitis  Assessment & Plan  · Transaminitis due to rhabdomyolysis, resolved    DDD (degenerative disc disease), lumbosacral  Assessment & Plan  · Lumbar radiculopathy with previously scheduled surgery now on hold  Previous attending discussed case with patient's outpatient neurosurgeon  Initial surgery was canceled prior to hospitalization  It was decided that patient will need a much larger procedure with multilevel fusion and scoliosis correction  They discussed that surgery would be high risk given prolonged anesthesia time and comorbidities  · Prior to admission was on gabapentin and morphine IR 15 mg  · Continue gabapentin, she was weaned off morphine  · Outpatient follow-up with neuro surgery      Tobacco abuse  Assessment & Plan  · Continue Nicotine patch    Depression  Assessment & Plan  · Stable    · Continue Effexor 187 5 mg daily and Seroquel 50 mg b i d  Medical Problems             Resolved Problems  Date Reviewed: 11/7/2022          Resolved    GERD (gastroesophageal reflux disease) 9/26/2022     Resolved by  Lucille Silva DO              Discharging Physician / Practitioner: Zayda Garcia  PCP: Severa Robson, MD  Admission Date:   Admission Orders (From admission, onward)     Ordered        09/19/22 Aleena Gonzales 364  Once                      Discharge Date: 11/07/22    Consultations During Hospital Stay:  · Nephrology  · Gastroenterology  · Neurology  · Interventional Radiology  · Orthopedic surgery  · Infectious disease  · Cardiology  · Behavioral Health    Procedures Performed:   · LP    Significant Findings / Test Results:   · 09/19/2022 CT head without contrast  IMPRESSION:  No acute intracranial process  No skull fracture      · 09/19/2022 CT C-spine  IMPRESSION:  No cervical spine fracture or traumatic malalignment  Incidental finding of partially visualized subpleural groundglass opacity in the left pulmonary apex presumably scarring  Cannot exclude infiltrate  · 09/19/2022  CT chest abdomen pelvis  IMPRESSION:  Bilateral groundglass opacities with superimposed inter and intralobular septal thickening  Differential diagnosis includes pulmonary hemorrhage, infection, and pulmonary edema, although the distribution is atypical for edema  · CT of the right lower extremity 09/19/2022  MPRESSION:  There is no evidence of intramuscular hematoma or other mass lesion in the right calf  Please note that rhabdomyolysis and compartment syndrome are clinical diagnoses, and are not excluded based on these imaging findings      · 09/19/2022 lower extremity venous duplex  CONCLUSION:  Impression:  RIGHT LOWER LIMB  No evidence of acute or chronic deep vein thrombosis   No evidence of superficial thrombophlebitis noted  Doppler evaluation shows a normal response to augmentation maneuvers    Popliteal, posterior tibial and anterior tibial arterial Doppler waveforms are  monophasic  LEFT LOWER LIMB LIMITED  Unable to obtain comparison due to patient cooperation  · 09/23/2022 MRI brain without contrast  IMPRESSION:  No evidence of recent infarct  No mass effect or midline shift  Study performed in the limited fashion with extensive motion artifact  · 10/14/2022 MRI of the brain with and without contrast  IMPRESSION:  No acute infarct seen  Development of severe periventricular and white matter T2 hyperintensities in the supratentorial region and in the lavelle without any significant mass effect, enhancement or hemorrhage,Correlate with toxic encephalopathy  Follow-up at 3 months suggested   to demonstrate resolution  No acute hemorrhage seen  No enhancing intraparenchymal lesion seen    Incidental Findings:   MRI of the brain  Development of severe periventricular and white matter T2 hyperintensities in the supratentorial region and in the lavelle without any significant mass effect, enhancement or hemorrhage,Correlate with toxic encephalopathy  Follow-up at 3 months suggested       Test Results Pending at Discharge (will require follow up):   · none     Outpatient Tests Requested:  · Patient will need blood cultures 11/08/2020 tube to be done and arranged at [facility]    Complications:  None    Reason for Admission:  Rhabdomyolysis    Hospital Course:   Belkis Harrell is a 45 y o  female patient who originally presented to the hospital on 9/19/2022 due to acute encephalopathy  Prior to admission patient suffered a fall, non witnessed  She has been sedentary for about 4 days  On admission found to have acute kidney injury, rhabdomyolysis, transaminitis she did not require hemodialysis  Resolved with IV fluids  Toxic metabolic encephalopathy multifactorial in secondary to acute kidney injury, morphine, gabapentin teen and infection    MRI of the brain showed severe hyper ventricular and white matter hyperintensities which needs to be followed up in 3 months  Patient had MSSA bacteremia suspect due to phlebitis, echo without vegetations, completed antibiotics on 10/26/2022  Patient will need repeat blood cultures in 2 weeks from completion of her antibiotics on 11/08/2022  Outpatient follow-up with neuro surgery for lumbosacral degenerative disc disease  Please see above list of diagnoses and related plan for additional information  Condition at Discharge: fair    Discharge Day Visit / Exam:   Subjective:  Patient was seen and evaluated bedside  Has no complaints  Vitals: Blood Pressure: 112/77 (11/07/22 0733)  Pulse: 100 (11/07/22 0733)  Temperature: 97 8 °F (36 6 °C) (11/07/22 0733)  Temp Source: Oral (11/07/22 0733)  Respirations: 16 (11/07/22 0733)  Height: 5' 6" (167 6 cm) (10/18/22 0848)  Weight - Scale: 51 7 kg (113 lb 15 7 oz) (10/14/22 0600)  SpO2: 98 % (11/07/22 0733)  Exam:   Physical Exam  Constitutional:       General: She is not in acute distress  HENT:      Head: Atraumatic  Cardiovascular:      Rate and Rhythm: Normal rate and regular rhythm  Heart sounds: No murmur heard  No friction rub  No gallop  Pulmonary:      Effort: Pulmonary effort is normal  No respiratory distress  Breath sounds: Normal breath sounds  No wheezing  Abdominal:      General: Bowel sounds are normal  There is no distension  Palpations: Abdomen is soft  Tenderness: There is no abdominal tenderness  Musculoskeletal:         General: No swelling  Cervical back: Neck supple  Skin:     General: Skin is warm and dry  Neurological:      General: No focal deficit present  Mental Status: She is alert  Psychiatric:         Mood and Affect: Mood normal            Discharge instructions/Information to patient and family:   See after visit summary for information provided to patient and family        Provisions for Follow-Up Care:  See after visit summary for information related to follow-up care and any pertinent home health orders  Disposition:   Other: [facility]    Planned Readmission: no     Discharge Statement:  I spent 45 minutes discharging the patient  This time was spent on the day of discharge  I had direct contact with the patient on the day of discharge  Greater than 50% of the total time was spent examining patient, answering all patient questions, arranging and discussing plan of care with patient as well as directly providing post-discharge instructions  Additional time then spent on discharge activities  Discharge Medications:  See after visit summary for reconciled discharge medications provided to patient and/or family        **Please Note: This note may have been constructed using a voice recognition system**

## 2022-11-08 ENCOUNTER — TRANSITIONAL CARE MANAGEMENT (OUTPATIENT)
Dept: FAMILY MEDICINE CLINIC | Facility: CLINIC | Age: 52
End: 2022-11-08

## 2022-11-13 NOTE — UTILIZATION REVIEW
NOTIFICATION OF ADMISSION DISCHARGE   This is a Notification of Discharge from [address]  Please be advised that this patient has been discharge from our facility  Below you will find the admission and discharge date and time including the patient’s disposition  UTILIZATION REVIEW CONTACT:  Papo Jacobs  Utilization   Network Utilization Review Department  Phone: [phone] x carefully listen to the prompts  All voicemails are confidential   Email: [email or web address]     ADMISSION INFORMATION  PRESENTATION DATE: 9/19/2022  8:04 AM    INPATIENT ADMISSION DATE: 9/19/22 10:50 AM   DISCHARGE DATE: 11/7/2022  3:00 PM  DISPOSITION: Non [facility]      IMPORTANT INFORMATION:  Send all requests for admission clinical reviews, approved or denied determinations and any other requests to dedicated fax number below belonging to the campus where the patient is receiving treatment   List of dedicated fax numbers:  [address] DENIALS (Administrative/Medical Necessity) [phone]   [address] (Maternity/NICU/Pediatrics) [phone]   [facility] [phone]   [place] [address] [phone]   [address] [phone]   [address] [place] [address] [phone]   [address] [place] [address] [place] [phone] [address] [facility] [phone]   [address] [place] [address] Katy Lavon          Albertina Caro MD   Physician   Hospitalist   Discharge Summary      Signed   Date of Service:  11/7/2022  2:19 PM                 Signed              Show:Clear all  [x]Manual[x]Template[x]Copied    Added by:  Leela Cast MD      []Osiel for details    [facility]  Discharge- Jayde Montano 1970, 45 y o  female MRN: [id number]  Unit/Bed#: Metsa 68 2 -01 Encounter: [id number]  Primary Care Provider: Severa Robson, MD   Date and time admitted to hospital: 9/19/2022  8:04 AM     * Toxic metabolic encephalopathy  Assessment & Plan  · Felt to be multifactorial due to acute kidney injury, on top morphine and gabapentin use, infection  · Her MRI was noted to have severe periventricular and white matter hyperintensities which will need 3 month follow-up  · Improved  · Will be discharged to [facility]     Traumatic rhabdomyolysis [facility])  Assessment & Plan  · Secondary to fall, patient had been laying in bed for approximately 4 days  · She was subsequently brought to the hospital and found to be in acute rhabdomyolysis with ANUJA  · Had been on multiple sedating medications including MS Contin as well as gabapentin, which likely were contributing to sedation  · Now resolved     ARF (acute renal failure) ([facility] )  Assessment & Plan  · ANUJA due to rhabdomyolysis, resolved        Bacteremia due to methicillin susceptible Staphylococcus aureus (MSSA)  Assessment & Plan  · MSSA bacteremia, likely due to phlebitis  · Resolved  · Echo with no vegetation  · Completed abx 10/26/22   · Repeat blood cultures 2 week from completion of antibiotics per ID recommendation on 11/08 to be done at [facility]     Right foot pain  Assessment & Plan  · Description, Location and response to stretching of the plantar fascia consistent with plantar fasciitis  · Educated regarding stretching and good footwear  · Supportive care     Ambulatory dysfunction  Assessment & Plan  · Will be discharged to [facility]     Mild protein-calorie malnutrition [facility] )  Assessment & Plan  Malnutrition Findings:   Adult Malnutrition type: Acute illness  Adult Degree of Malnutrition: Malnutrition of moderate degree  Malnutrition Characteristics: Fat loss, Muscle loss, Inadequate energy, Weight loss                    360 Statement: Acute moderate pro, chelsey malnutrition d/t condition as evidence by mild muscle and fat loss at temples, orbitals, triceps, <75% energy intake > 7 days, 18 1% wt loss x 1 month, BMI 18 4, currently treated with liberalized oral diet, tried supplements, but PT refused, will continue to monitor for food preferences and additional snacks in between meals      BMI Findings:  Adult BMI Classifications: Underweight < 18 5        Body mass index is 18 4 kg/m²          Diarrhea  Assessment & Plan  · Resolved     Aspiration pneumonitis (HCC)  Assessment & Plan  · Patient with significant wheezing as well as new right lower lobe opacity seen on imaging study  Likely aspiration pneumonia/pneumonitis in the setting of encephalopathy  · Resolved, patient tolerating regular diet     Abnormal CT of the chest  Assessment & Plan  · CT of the chest on 10/01 showed bilateral opacities and pleural effusion  · Repeat chest x-ray on 10/5 showed resolution of the pulmonary edema and effusion        Transaminitis  Assessment & Plan  · Transaminitis due to rhabdomyolysis, resolved     DDD (degenerative disc disease), lumbosacral  Assessment & Plan  · Lumbar radiculopathy with previously scheduled surgery now on hold  Previous attending discussed case with patient's outpatient neurosurgeon  Initial surgery was canceled prior to hospitalization  It was decided that patient will need a much larger procedure with multilevel fusion and scoliosis correction  They discussed that surgery would be high risk given prolonged anesthesia time and comorbidities  · Prior to admission was on gabapentin and morphine IR 15 mg  · Continue gabapentin, she was weaned off morphine  · Outpatient follow-up with neuro surgery        Tobacco abuse  Assessment & Plan  · Continue Nicotine patch     Depression  Assessment & Plan  · Stable    · Continue Effexor 187 5 mg daily and Seroquel 50 mg b i d           Medical Problems                  Resolved Problems  Date Reviewed: 11/7/2022                    Resolved      GERD (gastroesophageal reflux disease) 9/26/2022        Resolved by  Park García DO                  Discharging Physician / Practitioner: Leigh Ann Deras  PCP: Fartun Hough MD  Admission Date:       Admission Orders (From admission, onward)              Ordered          09/19/22 1050   INPATIENT ADMISSION  Once                          Discharge Date: 11/07/22     Consultations During Hospital Stay:  · Nephrology  · Gastroenterology  · Neurology  · Interventional Radiology  · Orthopedic surgery  · Infectious disease  · Cardiology  · Behavioral Health     Procedures Performed:   · LP     Significant Findings / Test Results:   · 09/19/2022 CT head without contrast  IMPRESSION:  No acute intracranial process  No skull fracture      · 09/19/2022 CT C-spine  IMPRESSION:  No cervical spine fracture or traumatic malalignment  Incidental finding of partially visualized subpleural groundglass opacity in the left pulmonary apex presumably scarring  Cannot exclude infiltrate      · 09/19/2022  CT chest abdomen pelvis  IMPRESSION:  Bilateral groundglass opacities with superimposed inter and intralobular septal thickening   Differential diagnosis includes pulmonary hemorrhage, infection, and pulmonary edema, although the distribution is atypical for edema      · CT of the right lower extremity 09/19/2022  MPRESSION:  There is no evidence of intramuscular hematoma or other mass lesion in the right calf   Please note that rhabdomyolysis and compartment syndrome are clinical diagnoses, and are not excluded based on these imaging findings      · 09/19/2022 lower extremity venous duplex  CONCLUSION:  Impression:  RIGHT LOWER LIMB  No evidence of acute or chronic deep vein thrombosis   No evidence of superficial thrombophlebitis noted    Doppler evaluation shows a normal response to augmentation maneuvers  Popliteal, posterior tibial and anterior tibial arterial Doppler waveforms are  monophasic      LEFT LOWER LIMB LIMITED  Unable to obtain comparison due to patient cooperation      · 09/23/2022 MRI brain without contrast  IMPRESSION:  No evidence of recent infarct   No mass effect or midline shift   Study performed in the limited fashion with extensive motion artifact      · 10/14/2022 MRI of the brain with and without contrast  IMPRESSION:  No acute infarct seen  Development of severe periventricular and white matter T2 hyperintensities in the supratentorial region and in the lavelle without any significant mass effect, enhancement or hemorrhage,Correlate with toxic encephalopathy   Follow-up at 3 months suggested   to demonstrate resolution  No acute hemorrhage seen  No enhancing intraparenchymal lesion seen     Incidental Findings:   MRI of the brain  Development of severe periventricular and white matter T2 hyperintensities in the supratentorial region and in the lavelle without any significant mass effect, enhancement or hemorrhage,Correlate with toxic encephalopathy   Follow-up at 3 months suggested         Test Results Pending at Discharge (will require follow up):   · none     Outpatient Tests Requested:  · Patient will need blood cultures 11/08/2020 tube to be done and arranged at [facility]     Complications:  None     Reason for Admission:  Rhabdomyolysis     Hospital Course:   Mary Overton is a 45 y o  female patient who originally presented to the hospital on 9/19/2022 due to acute encephalopathy  Prior to admission patient suffered a fall, non witnessed  She has been sedentary for about 4 days  On admission found to have acute kidney injury, rhabdomyolysis, transaminitis she did not require hemodialysis  Resolved with IV fluids  Toxic metabolic encephalopathy multifactorial in secondary to acute kidney injury, morphine, gabapentin teen and infection    MRI of the brain showed severe hyper ventricular and white matter hyperintensities which needs to be followed up in 3 months  Patient had MSSA bacteremia suspect due to phlebitis, echo without vegetations, completed antibiotics on 10/26/2022  Patient will need repeat blood cultures in 2 weeks from completion of her antibiotics on 11/08/2022  Outpatient follow-up with neuro surgery for lumbosacral degenerative disc disease         Please see above list of diagnoses and related plan for additional information       Condition at Discharge: fair     Discharge Day Visit / Exam:   Subjective:  Patient was seen and evaluated bedside  Has no complaints  Vitals: Blood Pressure: 112/77 (11/07/22 0733)  Pulse: 100 (11/07/22 0733)  Temperature: 97 8 °F (36 6 °C) (11/07/22 0733)  Temp Source: Oral (11/07/22 0733)  Respirations: 16 (11/07/22 0733)  Height: 5' 6" (167 6 cm) (10/18/22 0848)  Weight - Scale: 51 7 kg (113 lb 15 7 oz) (10/14/22 0600)  SpO2: 98 % (11/07/22 0733)  Exam:   Physical Exam  Constitutional:       General: She is not in acute distress  HENT:      Head: Atraumatic  Cardiovascular:      Rate and Rhythm: Normal rate and regular rhythm  Heart sounds: No murmur heard  No friction rub  No gallop  Pulmonary:      Effort: Pulmonary effort is normal  No respiratory distress  Breath sounds: Normal breath sounds  No wheezing  Abdominal:      General: Bowel sounds are normal  There is no distension  Palpations: Abdomen is soft  Tenderness: There is no abdominal tenderness  Musculoskeletal:         General: No swelling  Cervical back: Neck supple  Skin:     General: Skin is warm and dry  Neurological:      General: No focal deficit present  Mental Status: She is alert     Psychiatric:         Mood and Affect: Mood normal                Discharge instructions/Information to patient and family:   See after visit summary for information provided to patient and family        Provisions for Follow-Up Care:  See after visit summary for information related to follow-up care and any pertinent home health orders  Disposition:   Other: [facility]     Planned Readmission: no     Discharge Statement:  I spent 45 minutes discharging the patient  This time was spent on the day of discharge  I had direct contact with the patient on the day of discharge  Greater than 50% of the total time was spent examining patient, answering all patient questions, arranging and discussing plan of care with patient as well as directly providing post-discharge instructions    Additional time then spent on discharge activities      Discharge Medications:  See after visit summary for reconciled discharge medications provided to patient and/or family        **Please Note: This note may have been constructed using a voice recognition system**

## 2022-11-18 ENCOUNTER — TELEPHONE (OUTPATIENT)
Dept: NEUROLOGY | Facility: CLINIC | Age: 52
End: 2022-11-18

## 2022-11-18 NOTE — TELEPHONE ENCOUNTER
1ST ATTEMPT - Called pt's primary number twice  Phone number would ring and at the end, unable to leave a message  If pt calls back, please schedule HFU       HFU/SLA/ENCEPHALOPATHY ACUTE      NOTES: Will order repeat MRI brain w and wo contrast to be completed in 3 months, then will have patient follow up with a general neurology attending at that point  The office can call the patient's  to schedule the appointment

## 2022-11-21 ENCOUNTER — TRANSITIONAL CARE MANAGEMENT (OUTPATIENT)
Dept: FAMILY MEDICINE CLINIC | Facility: CLINIC | Age: 52
End: 2022-11-21

## 2022-11-22 ENCOUNTER — TRANSITIONAL CARE MANAGEMENT (OUTPATIENT)
Dept: FAMILY MEDICINE CLINIC | Facility: CLINIC | Age: 52
End: 2022-11-22

## 2022-11-22 ENCOUNTER — OFFICE VISIT (OUTPATIENT)
Dept: FAMILY MEDICINE CLINIC | Facility: CLINIC | Age: 52
End: 2022-11-22

## 2022-11-22 VITALS
TEMPERATURE: 97.8 F | DIASTOLIC BLOOD PRESSURE: 58 MMHG | HEART RATE: 99 BPM | WEIGHT: 118.25 LBS | SYSTOLIC BLOOD PRESSURE: 94 MMHG | OXYGEN SATURATION: 97 % | BODY MASS INDEX: 19.01 KG/M2 | HEIGHT: 66 IN

## 2022-11-22 DIAGNOSIS — B95.61 BACTEREMIA DUE TO METHICILLIN SUSCEPTIBLE STAPHYLOCOCCUS AUREUS (MSSA): ICD-10-CM

## 2022-11-22 DIAGNOSIS — F33.2 SEVERE EPISODE OF RECURRENT MAJOR DEPRESSIVE DISORDER, WITHOUT PSYCHOTIC FEATURES (HCC): ICD-10-CM

## 2022-11-22 DIAGNOSIS — M79.671 RIGHT FOOT PAIN: ICD-10-CM

## 2022-11-22 DIAGNOSIS — I10 PRIMARY HYPERTENSION: ICD-10-CM

## 2022-11-22 DIAGNOSIS — J69.0 ASPIRATION PNEUMONITIS (HCC): ICD-10-CM

## 2022-11-22 DIAGNOSIS — R78.81 BACTEREMIA DUE TO METHICILLIN SUSCEPTIBLE STAPHYLOCOCCUS AUREUS (MSSA): ICD-10-CM

## 2022-11-22 DIAGNOSIS — E44.1 MILD PROTEIN-CALORIE MALNUTRITION (HCC): ICD-10-CM

## 2022-11-22 DIAGNOSIS — R26.2 AMBULATORY DYSFUNCTION: Primary | ICD-10-CM

## 2022-11-22 DIAGNOSIS — G47.09 OTHER INSOMNIA: ICD-10-CM

## 2022-11-22 DIAGNOSIS — N17.8 ACUTE RENAL FAILURE WITH OTHER SPECIFIED PATHOLOGICAL LESION IN KIDNEY (HCC): ICD-10-CM

## 2022-11-22 DIAGNOSIS — F11.20 UNCOMPLICATED OPIOID DEPENDENCE (HCC): ICD-10-CM

## 2022-11-22 DIAGNOSIS — G92.8 TOXIC METABOLIC ENCEPHALOPATHY: ICD-10-CM

## 2022-11-22 DIAGNOSIS — M51.37 DDD (DEGENERATIVE DISC DISEASE), LUMBOSACRAL: ICD-10-CM

## 2022-11-22 DIAGNOSIS — E78.2 MIXED HYPERLIPIDEMIA: ICD-10-CM

## 2022-11-22 DIAGNOSIS — Z72.0 TOBACCO ABUSE: ICD-10-CM

## 2022-11-22 DIAGNOSIS — T79.6XXS TRAUMATIC RHABDOMYOLYSIS, SEQUELA: ICD-10-CM

## 2022-11-22 DIAGNOSIS — R73.9 HYPERGLYCEMIA: ICD-10-CM

## 2022-11-22 DIAGNOSIS — K21.00 ESOPHAGITIS, REFLUX: ICD-10-CM

## 2022-11-22 DIAGNOSIS — Z23 NEED FOR INFLUENZA VACCINATION: ICD-10-CM

## 2022-11-22 PROBLEM — E44.0 MODERATE PROTEIN-CALORIE MALNUTRITION (HCC): Status: RESOLVED | Noted: 2022-10-25 | Resolved: 2022-11-22

## 2022-11-22 RX ORDER — ALPRAZOLAM 0.5 MG/1
0.5 TABLET ORAL
Qty: 15 TABLET | Refills: 0 | Status: SHIPPED | OUTPATIENT
Start: 2022-11-22 | End: 2022-12-07

## 2022-11-22 RX ORDER — OMEPRAZOLE 40 MG/1
40 CAPSULE, DELAYED RELEASE ORAL DAILY
Qty: 90 CAPSULE | Refills: 1 | Status: SHIPPED | OUTPATIENT
Start: 2022-11-22

## 2022-11-22 RX ORDER — NALOXONE HYDROCHLORIDE 4 MG/.1ML
SPRAY NASAL
Qty: 1 EACH | Refills: 1 | Status: SHIPPED | OUTPATIENT
Start: 2022-11-22 | End: 2022-11-26

## 2022-11-22 RX ORDER — METOPROLOL SUCCINATE 25 MG/1
12.5 TABLET, EXTENDED RELEASE ORAL DAILY
Qty: 15 TABLET | Refills: 0
Start: 2022-11-22

## 2022-11-22 RX ORDER — METHOCARBAMOL 750 MG/1
750 TABLET, FILM COATED ORAL 3 TIMES DAILY
COMMUNITY
Start: 2022-09-02 | End: 2022-11-22

## 2022-11-22 RX ORDER — TRAMADOL HYDROCHLORIDE 50 MG/1
TABLET ORAL
COMMUNITY
Start: 2022-11-16 | End: 2022-11-26

## 2022-11-22 RX ORDER — METOPROLOL SUCCINATE 25 MG/1
TABLET, EXTENDED RELEASE ORAL
COMMUNITY
Start: 2022-11-17 | End: 2022-11-22 | Stop reason: SDUPTHER

## 2022-11-22 NOTE — ASSESSMENT & PLAN NOTE
Recommend PT after evaluation at [facility]  It appears she was doing relatively well there  Again, this was related to mobility  Would ask that she review of the recommendations that were noted in discharge summary from both PT and OT

## 2022-11-22 NOTE — ASSESSMENT & PLAN NOTE
Malnutrition Findings:           patient did have some significant issues with regard to the hospitalization  With that, she had a severe weight loss  At this point, her weight seems to be coming back up  Encouraged her to continue exercise, as well as eat balanced meals, snacks as appropriate  BMI Findings: Body mass index is 19 09 kg/m²

## 2022-11-22 NOTE — ASSESSMENT & PLAN NOTE
Patient having significant pain previous to admission  Since her hospital admission, the pain has started to come back  While she was in the hospital, there were other issues that were more pressing  It did not seem to be as much of an issue  Since being home, she feels she has done differently with her activity, and noticed increasing discomfort  Recommend follow with neuro surgery at some point  PT, consider OT

## 2022-11-22 NOTE — ASSESSMENT & PLAN NOTE
Patient smokes half pack per day  Currently has nicotine patches available  She is not currently using the patches while she smokes

## 2022-11-22 NOTE — PATIENT INSTRUCTIONS
1  Ambulatory dysfunction  Assessment & Plan:  Recommend PT after evaluation at [address] [place] [address]  It appears she was doing relatively well there  Again, this was related to mobility  Would ask that she review of the recommendations that were noted in discharge summary from both PT and OT  Orders:  -     Ambulatory Referral to Physical Therapy; Future  -     EMG 2 limb lower extremity; Future  -     Comprehensive metabolic panel; Future; Expected date: 11/22/2022  -     Ambulatory Referral to Social Work Care Management Program; Future    2  Toxic metabolic encephalopathy  Assessment & Plan:  Patient does have issues with regard to metabolic encephalopathy  There is still memory issues  She is having trouble with that, and she acknowledged that as well  Would recommend speech therapy at some point in the future  Would like to get her in a better position as far as pain control is concerned, which means PT, neuro surgery, discussion of what is going on with her foot  Orders:  -     Comprehensive metabolic panel; Future; Expected date: 11/22/2022  -     TSH, 3rd generation; Future; Expected date: 11/22/2022  -     Ambulatory Referral to Social Work Care Management Program; Future  -     Ambulatory Referral to Psychiatry; Future    3  Traumatic rhabdomyolysis, sequela  Assessment & Plan:  Noted in hospital   IVF  Kidney function improved  Will like to recheck blood at some point  Orders:  -     Comprehensive metabolic panel; Future; Expected date: 11/22/2022  -     CK (with reflex to MB); Future  -     Ambulatory Referral to Social Work Care Management Program; Future    4  Mild protein-calorie malnutrition ([address] )  Assessment & Plan:  Malnutrition Findings:           patient did have some significant issues with regard to the hospitalization  With that, she had a severe weight loss  At this point, her weight seems to be coming back up    Encouraged her to continue exercise, as well as eat balanced meals, snacks as appropriate  BMI Findings: Body mass index is 19 09 kg/m²  Orders:  -     Comprehensive metabolic panel; Future; Expected date: 11/22/2022  -     Ambulatory Referral to Social Work Care Management Program; Future    5  Acute renal failure with other specified pathological lesion in kidney [facility])  Assessment & Plan:  Noted previously  Recheck labs  Orders:  -     Comprehensive metabolic panel; Future; Expected date: 11/22/2022  -     Ambulatory Referral to Social Work Care Management Program; Future    6  Bacteremia due to methicillin susceptible Staphylococcus aureus (MSSA)  Assessment & Plan:  Noted during her hospital admission  Nothing specific at the moment  Orders:  -     Comprehensive metabolic panel; Future; Expected date: 11/22/2022  -     Ambulatory Referral to Social Work Care Management Program; Future    7  Aspiration pneumonitis [facility])  Assessment & Plan:  Again, noted on hospital admission, but now not any specific issues  Orders:  -     Comprehensive metabolic panel; Future; Expected date: 11/22/2022  -     Ambulatory Referral to Social Work Care Management Program; Future    8  DDD (degenerative disc disease), lumbosacral  Assessment & Plan:  Patient having significant pain previous to admission  Since her hospital admission, the pain has started to come back  While she was in the hospital, there were other issues that were more pressing  It did not seem to be as much of an issue  Since being home, she feels she has done differently with her activity, and noticed increasing discomfort  Recommend follow with neuro surgery at some point  PT, consider OT  Orders:  -     Comprehensive metabolic panel; Future; Expected date: 11/22/2022  -     Ambulatory Referral to Social Work Care Management Program; Future  -     naloxone [facility]) 4 mg/0 1 mL nasal spray; Administer 1 spray into a nostril   If no response after 2-3 minutes, give another dose in the other nostril using a new spray  9  Severe episode of recurrent major depressive disorder, without psychotic features [facility])  Assessment & Plan:  Patient is on increased dose of Effexor  Recommend follow-up with Psychiatry  Continue Effexor at current dose  She is also on Seroquel, I would recommend that she continue that  Orders:  -     ALPRAZolam (XANAX) 0 5 mg tablet; Take 1 tablet (0 5 mg total) by mouth daily at bedtime as needed for anxiety or sleep for up to 15 days  -     Comprehensive metabolic panel; Future; Expected date: 11/22/2022  -     TSH, 3rd generation; Future; Expected date: 11/22/2022  -     Ambulatory Referral to Social Work Care Management Program; Future    10  Uncomplicated opioid dependence [facility])  Assessment & Plan:  Patient is currently on tramadol from [address] [place] [address] Avenue  Would recommend that she continue for now  She has been using it at b i d , but with the amount of discomfort she is now having, could increase to t i d   Again, the plan is for PT, neuro surgery to re-evaluate  Orders:  -     Comprehensive metabolic panel; Future; Expected date: 11/22/2022  -     Ambulatory Referral to Social Work Care Management Program; Future  -     naloxone [facility]) 4 mg/0 1 mL nasal spray; Administer 1 spray into a nostril  If no response after 2-3 minutes, give another dose in the other nostril using a new spray  11  Esophagitis, reflux  -     omeprazole (PriLOSEC) 40 MG capsule; Take 1 capsule (40 mg total) by mouth daily  -     Comprehensive metabolic panel; Future; Expected date: 11/22/2022  -     Ambulatory Referral to Social Work Care Management Program; Future    12  Hyperglycemia  Assessment & Plan:  Patient did have high blood sugar at 1 point  Check labs  Orders:  -     Comprehensive metabolic panel; Future; Expected date: 11/22/2022  -     Ambulatory Referral to Social Work Care Management Program; Future    13   Other insomnia  Assessment & Plan:  Patient does have issues getting to sleep  She has been using Seroquel at , but also using Xanax  She would like to continue that for now  This seems reasonable for her  Reviewed about addiction potential with this medication  Orders:  -     Comprehensive metabolic panel; Future; Expected date: 11/22/2022  -     Ambulatory Referral to Social Work Care Management Program; Future    14  Tobacco abuse  Assessment & Plan:  Patient smokes half pack per day  Currently has nicotine patches available  She is not currently using the patches while she smokes  Orders:  -     Comprehensive metabolic panel; Future; Expected date: 11/22/2022  -     Ambulatory Referral to Social Work Care Management Program; Future    15  Right foot pain  Assessment & Plan:  Patient did have some significant right foot pain, there was a question about whether not it was footdrop verses plantar fasciitis  Recommended to have EMG by the discharge at [facility]  Of note, her right foot today is quite warm red  Her left foot is also warm, but not quite the same temperature as the right  There is significant swelling on the right foot  Check x-ray, uric acid  Orders:  -     EMG 2 limb lower extremity; Future  -     CBC and differential; Future  -     Comprehensive metabolic panel; Future; Expected date: 11/22/2022  -     Ambulatory Referral to Social Work Care Management Program; Future  -     XR foot 3+ vw right; Future; Expected date: 11/22/2022  -     Uric acid; Future    16  Need for influenza vaccination  -     influenza vaccine, quadrivalent, recombinant, PF, 0 5 mL, for patients 18 yr+ (FLUBLOK)  -     Ambulatory Referral to Social Work Care Management Program; Future    17  Mixed hyperlipidemia  -     Comprehensive metabolic panel; Future; Expected date: 11/22/2022  -     Lipid Panel with Direct LDL reflex; Future; Expected date: 11/22/2022    18   Primary hypertension  -     metoprolol succinate (TOPROL-XL) 25 mg 24 hr tablet; Take 0 5 tablets (12 5 mg total) by mouth daily      COVID 19 Instructions    Papo Verma was advised to limit contact with others to essential tasks such as getting food, medications, and medical care  Proper handwashing reviewed, and Hand sanitzer when washing is not available  If the patient develops symptoms of COVID 19, the patient should call the office as soon as possible  For 8350-1099 Flu season, it is strongly recommended that Flu Vaccinations be obtained  Virtual Visits:  Michelle: This works on smart phones (any phone with Internet browsing capability)  You should get a text message when the provider is ready to see you  Click on the link in the text message, and the call should start  You will need to type in your name, and allow camera and microphone access  This is HIPPA compliant, and secure  If you have not already done so, get immunized to COVID 19  We are committed to getting you vaccinated as soon as possible and will be closely following CDC and [facility]  guidelines as they are released and revised  Please refer to our COVID-19 vaccine webpage for the most up to date information on the vaccine and our distribution efforts  This site will also have the most up to date recommendations for COVID booster vaccine  [employer] tn    Call [phone] ([phone]), option 7    OUR NEW LOCATION:    [address], [address] [place] [address] [address] [place], [address]  Fax: [phone]    Lab services and OB/GYN are at this location as well  Dear Papo Verma: There are some changes coming with regard to controlled substance prescriptions at [facility]      To maintain the highest quality care, we will be asking you to:  Come in every 3 months to every 6 months  Get a urine drug screen every year, or sooner if appropriate  Sign a controlled substance use contract  Answer questions with every visit for this medication   Fill a prescription for Narcan (opioid reversal agent) if appropriate  This is for your safety with using controlled substances  This decision is network wide for all patients on chronic controlled substances  It is based on guidelines from [facility] and [facility]  If you have any questions, please bring them up with your provider at your next visit  We look forward to continuing to provide you with the best possible quality care           The Providers of [address] [place] [address] Primary Care

## 2022-11-22 NOTE — ASSESSMENT & PLAN NOTE
Patient does have issues with regard to metabolic encephalopathy  There is still memory issues  She is having trouble with that, and she acknowledged that as well  Would recommend speech therapy at some point in the future  Would like to get her in a better position as far as pain control is concerned, which means PT, neuro surgery, discussion of what is going on with her foot

## 2022-11-22 NOTE — ASSESSMENT & PLAN NOTE
Patient does have issues getting to sleep  She has been using Seroquel at [facility], but also using Xanax  She would like to continue that for now  This seems reasonable for her  Reviewed about addiction potential with this medication

## 2022-11-22 NOTE — ASSESSMENT & PLAN NOTE
Patient did have some significant right foot pain, there was a question about whether not it was footdrop verses plantar fasciitis  Recommended to have EMG by the discharge at [facility]  Of note, her right foot today is quite warm red  Her left foot is also warm, but not quite the same temperature as the right  There is significant swelling on the right foot  Check x-ray, uric acid

## 2022-11-22 NOTE — ASSESSMENT & PLAN NOTE
Patient is on increased dose of Effexor  Recommend follow-up with Psychiatry  Continue Effexor at current dose  She is also on Seroquel, I would recommend that she continue that

## 2022-11-22 NOTE — PROGRESS NOTES
From Hospital discharge:    Nury Eric is a 45 y o  female patient who originally presented to the hospital on 9/19/2022 due to acute encephalopathy  Prior to admission patient suffered a fall, non witnessed  She has been sedentary for about 4 days  On admission found to have acute kidney injury, rhabdomyolysis, transaminitis she did not require hemodialysis  Resolved with IV fluids  Toxic metabolic encephalopathy multifactorial in secondary to acute kidney injury, morphine, gabapentin teen and infection  MRI of the brain showed severe hyper ventricular and white matter hyperintensities which needs to be followed up in 3 months  Patient had MSSA bacteremia suspect due to phlebitis, echo without vegetations, completed antibiotics on 10/26/2022  Patient will need repeat blood cultures in 2 weeks from completion of her antibiotics on 11/08/2022  Outpatient follow-up with neuro surgery for lumbosacral degenerative disc disease  Discharge from [facility]:  Toxic metabolic encephalopathy, rhabdomyolysis, ANUJA, bacteremia, MSSA bacteremia, ambulatory dysfunction, malnutrition, DJD, aspiration pneumonitis, depression  Recommended to follow-up with this office  Discharge medications included Effexor, Toradol, Xanax  From PT at [facility] they recommended rolling walker, follow-up with [employer] for right ankle brace  Home exercise program, outpatient physical therapy  OT at [facility]: Take time to complete activities, always have non slip surface on the feet, no heavy lifting or excessive bending, always push up from a surface you are sitting on, walker for mobility  Commode was recommended  Bathing with a bench in the tub, with someone nearby for assistance  Dressing from seated position  Take breaks at home

## 2022-11-22 NOTE — ASSESSMENT & PLAN NOTE
Patient is currently on tramadol from [address] [place] [address]  Would recommend that she continue for now  She has been using it at b i d , but with the amount of discomfort she is now having, could increase to t i d   Again, the plan is for PT, neuro surgery to re-evaluate

## 2022-11-22 NOTE — PROGRESS NOTES
Assessment & Plan     1  Ambulatory dysfunction  Assessment & Plan:  Recommend PT after evaluation at [address] [place] [address]  It appears she was doing relatively well there  Again, this was related to mobility  Would ask that she review of the recommendations that were noted in discharge summary from both PT and OT  Orders:  -     Ambulatory Referral to Physical Therapy; Future  -     EMG 2 limb lower extremity; Future  -     Comprehensive metabolic panel; Future; Expected date: 11/22/2022  -     Ambulatory Referral to Social Work Care Management Program; Future    2  Toxic metabolic encephalopathy  Assessment & Plan:  Patient does have issues with regard to metabolic encephalopathy  There is still memory issues  She is having trouble with that, and she acknowledged that as well  Would recommend speech therapy at some point in the future  Would like to get her in a better position as far as pain control is concerned, which means PT, neuro surgery, discussion of what is going on with her foot  Orders:  -     Comprehensive metabolic panel; Future; Expected date: 11/22/2022  -     TSH, 3rd generation; Future; Expected date: 11/22/2022  -     Ambulatory Referral to Social Work Care Management Program; Future  -     Ambulatory Referral to Psychiatry; Future    3  Traumatic rhabdomyolysis, sequela  Assessment & Plan:  Noted in hospital   IVF  Kidney function improved  Will like to recheck blood at some point  Orders:  -     Comprehensive metabolic panel; Future; Expected date: 11/22/2022  -     CK (with reflex to MB); Future  -     Ambulatory Referral to Social Work Care Management Program; Future    4  Mild protein-calorie malnutrition ([address] )  Assessment & Plan:  Malnutrition Findings:           patient did have some significant issues with regard to the hospitalization  With that, she had a severe weight loss  At this point, her weight seems to be coming back up    Encouraged her to continue exercise, as well as eat balanced meals, snacks as appropriate  BMI Findings: Body mass index is 19 09 kg/m²  Orders:  -     Comprehensive metabolic panel; Future; Expected date: 11/22/2022  -     Ambulatory Referral to Social Work Care Management Program; Future    5  Acute renal failure with other specified pathological lesion in kidney [facility])  Assessment & Plan:  Noted previously  Recheck labs  Orders:  -     Comprehensive metabolic panel; Future; Expected date: 11/22/2022  -     Ambulatory Referral to Social Work Care Management Program; Future    6  Bacteremia due to methicillin susceptible Staphylococcus aureus (MSSA)  Assessment & Plan:  Noted during her hospital admission  Nothing specific at the moment  Orders:  -     Comprehensive metabolic panel; Future; Expected date: 11/22/2022  -     Ambulatory Referral to Social Work Care Management Program; Future    7  Aspiration pneumonitis [facility])  Assessment & Plan:  Again, noted on hospital admission, but now not any specific issues  Orders:  -     Comprehensive metabolic panel; Future; Expected date: 11/22/2022  -     Ambulatory Referral to Social Work Care Management Program; Future    8  DDD (degenerative disc disease), lumbosacral  Assessment & Plan:  Patient having significant pain previous to admission  Since her hospital admission, the pain has started to come back  While she was in the hospital, there were other issues that were more pressing  It did not seem to be as much of an issue  Since being home, she feels she has done differently with her activity, and noticed increasing discomfort  Recommend follow with neuro surgery at some point  PT, consider OT  Orders:  -     Comprehensive metabolic panel; Future; Expected date: 11/22/2022  -     Ambulatory Referral to Social Work Care Management Program; Future  -     naloxone [facility]) 4 mg/0 1 mL nasal spray; Administer 1 spray into a nostril   If no response after 2-3 minutes, give another dose in the other nostril using a new spray  9  Severe episode of recurrent major depressive disorder, without psychotic features [facility])  Assessment & Plan:  Patient is on increased dose of Effexor  Recommend follow-up with Psychiatry  Continue Effexor at current dose  She is also on Seroquel, I would recommend that she continue that  Orders:  -     ALPRAZolam (XANAX) 0 5 mg tablet; Take 1 tablet (0 5 mg total) by mouth daily at bedtime as needed for anxiety or sleep for up to 15 days  -     Comprehensive metabolic panel; Future; Expected date: 11/22/2022  -     TSH, 3rd generation; Future; Expected date: 11/22/2022  -     Ambulatory Referral to Social Work Care Management Program; Future    10  Uncomplicated opioid dependence [facility])  Assessment & Plan:  Patient is currently on tramadol from [facility]  Would recommend that she continue for now  She has been using it at b i d , but with the amount of discomfort she is now having, could increase to t i d   Again, the plan is for PT, neuro surgery to re-evaluate  Orders:  -     Comprehensive metabolic panel; Future; Expected date: 11/22/2022  -     Ambulatory Referral to Social Work Care Management Program; Future  -     naloxone [facility]) 4 mg/0 1 mL nasal spray; Administer 1 spray into a nostril  If no response after 2-3 minutes, give another dose in the other nostril using a new spray  11  Esophagitis, reflux  -     omeprazole (PriLOSEC) 40 MG capsule; Take 1 capsule (40 mg total) by mouth daily  -     Comprehensive metabolic panel; Future; Expected date: 11/22/2022  -     Ambulatory Referral to Social Work Care Management Program; Future    12  Hyperglycemia  Assessment & Plan:  Patient did have high blood sugar at 1 point  Check labs  Orders:  -     Comprehensive metabolic panel; Future; Expected date: 11/22/2022  -     Ambulatory Referral to Social Work Care Management Program; Future    13   Other insomnia  Assessment & Plan:  Patient does have issues getting to sleep  She has been using Seroquel at , but also using Xanax  She would like to continue that for now  This seems reasonable for her  Reviewed about addiction potential with this medication  Orders:  -     Comprehensive metabolic panel; Future; Expected date: 11/22/2022  -     Ambulatory Referral to Social Work Care Management Program; Future    14  Tobacco abuse  Assessment & Plan:  Patient smokes half pack per day  Currently has nicotine patches available  She is not currently using the patches while she smokes  Orders:  -     Comprehensive metabolic panel; Future; Expected date: 11/22/2022  -     Ambulatory Referral to Social Work Care Management Program; Future    15  Right foot pain  Assessment & Plan:  Patient did have some significant right foot pain, there was a question about whether not it was footdrop verses plantar fasciitis  Recommended to have EMG by the discharge at [facility]  Of note, her right foot today is quite warm red  Her left foot is also warm, but not quite the same temperature as the right  There is significant swelling on the right foot  Check x-ray, uric acid  Orders:  -     EMG 2 limb lower extremity; Future  -     CBC and differential; Future  -     Comprehensive metabolic panel; Future; Expected date: 11/22/2022  -     Ambulatory Referral to Social Work Care Management Program; Future  -     XR foot 3+ vw right; Future; Expected date: 11/22/2022  -     Uric acid; Future    16  Need for influenza vaccination  -     influenza vaccine, quadrivalent, recombinant, PF, 0 5 mL, for patients 18 yr+ (FLUBLOK)  -     Ambulatory Referral to Social Work Care Management Program; Future    17  Mixed hyperlipidemia  -     Comprehensive metabolic panel; Future; Expected date: 11/22/2022  -     Lipid Panel with Direct LDL reflex; Future; Expected date: 11/22/2022    18   Primary hypertension  - metoprolol succinate (TOPROL-XL) 25 mg 24 hr tablet; Take 0 5 tablets (12 5 mg total) by mouth daily         Subjective     Transitional Care Management Review:   Sakshi Garcia is a 45 y o  female here for TCM follow up  During the TCM phone call patient stated:  TCM Call     Date and time call was made  11/21/2022  2:42 PM    Hospital care reviewed  Records reviewed    Patient was hospitialized at  Other (comment)    Comment  [facility]    Date of Admission  11/07/22    Date of discharge  11/17/22    Diagnosis  Toxic metabolic encephalopathy    Disposition  Home    Were the patients medications reviewed and updated  No    Current Symptoms  None      TCM Call     Post hospital issues  None    Should patient be enrolled in anticoag monitoring? No    Scheduled for follow up? Yes    Did you obtain your prescribed medications  Yes    Do you need help managing your prescriptions or medications  No    Is transportation to your appointment needed  No    I have advised the patient to call PCP with any new or worsening symptoms  Chika Beckman, Practice Administrator    Living Arrangements  Spouse or Significiant other    Support System  None    Are you recieving any outpatient services  No    Are you recieving home care services  No    Are you using any community resources  No    Current waiver services  No    Have you fallen in the last 12 months  No    Interperter language line needed  No    Comments  scheduled with dr Sangeeta Souza on 5/18        Patient is here to follow-up after recent hospitalization and also transferred to subacute rehab  Patient was admitted in September to [address] [place] [address], and then transferred in November to [facility]  She is now seen for discharge after [facility]  From Hospital discharge:    Sakshi Garcia is a 45 y o  female patient who originally presented to the hospital on 9/19/2022 due to acute encephalopathy  Prior to admission patient suffered a fall, non witnessed   She has been sedentary for about 4 days  On admission found to have acute kidney injury, rhabdomyolysis, transaminitis she did not require hemodialysis  Resolved with IV fluids  Toxic metabolic encephalopathy multifactorial in secondary to acute kidney injury, morphine, gabapentin teen and infection  MRI of the brain showed severe hyper ventricular and white matter hyperintensities which needs to be followed up in 3 months  Patient had MSSA bacteremia suspect due to phlebitis, echo without vegetations, completed antibiotics on 10/26/2022  Patient will need repeat blood cultures in 2 weeks from completion of her antibiotics on 11/08/2022  Outpatient follow-up with neuro surgery for lumbosacral degenerative disc disease  Discharge from [facility]:  Toxic metabolic encephalopathy, rhabdomyolysis, ANUJA, bacteremia, MSSA bacteremia, ambulatory dysfunction, malnutrition, DJD, aspiration pneumonitis, depression  Recommended to follow-up with this office  Discharge medications included Effexor, Toradol, Xanax  From PT at [facility] they recommended rolling walker, follow-up with [place] [facility] for right ankle brace  Home exercise program, outpatient physical therapy  OT at [facility]: Take time to complete activities, always have non slip surface on the feet, no heavy lifting or excessive bending, always push up from a surface you are sitting on, walker for mobility  Commode was recommended  Bathing with a bench in the tub, with someone nearby for assistance  Dressing from seated position  Take breaks at home  Discharge medications reviewed  Alprazolam 025 mg HS p r n   Gabapentin 300 mg q i d     Loperamide liquid p r n  Diarrhea  Metoprolol 25 mg, succinate, half a tablet daily  Nicotine patches  Seroquel 50 mg b i d  Florastor  Thiamin  Tramadol 50 mg b i d  For 2 weeks, as needed  Effexor 150 mg 1 daily, and Effexor 37 5 1 daily        On discharge from hospital, the patient was recommended to follow-up with neuro surgery for her back, which was originally some of the issues that she had  She was doing relatively well at hospital and [facility], but since being home and walking with the walker and going back some of her old habits, the back seems to be acting up a little bit more  Patient does have tramadol for the pain, which was a medication from [facility]  It was recommended that she take it b i d  Only  In the past, prior to surgery, she was given morphine  Review of Systems   Constitutional: Negative  HENT: Negative  Respiratory: Negative  Cardiovascular: Negative  Gastrointestinal: Negative  Musculoskeletal: Positive for back pain, gait problem and joint swelling  Objective     BP 94/58 (BP [place] Right arm, Patient Position: Sitting, Cuff Size: Standard)   Pulse 99   Temp 97 8 °F (36 6 °C) (Temporal)   Ht 5' 6" (1 676 m) Comment: per last  Wt 53 6 kg (118 lb 4 oz)   LMP 03/14/2019 (Approximate)   SpO2 97%   BMI 19 09 kg/m²      Physical Exam  Vitals and nursing note reviewed  Constitutional:       Appearance: Normal appearance  HENT:      Head: Normocephalic  Cardiovascular:      Rate and Rhythm: Normal rate and regular rhythm  Pulses: Normal pulses  Carotid pulses are 2+ on the right side and 2+ on the left side  Heart sounds: Normal heart sounds  No murmur heard  No friction rub  No gallop  Pulmonary:      Effort: Pulmonary effort is normal  No respiratory distress  Breath sounds: Normal breath sounds  No stridor  No wheezing, rhonchi or rales  Chest:      Chest wall: No tenderness  Feet:      Comments: Significant warmth and irritation of the right foot  Left foot is also somewhat warm, though less swelling and no irritation  Significant tenderness, especially towards the toes on the right foot  Neurological:      Mental Status: She is alert     Psychiatric:         Mood and Affect: Mood normal          Behavior: Behavior normal          Thought Content: Thought content is not paranoid or delusional  Thought content does not include homicidal or suicidal ideation  Thought content does not include homicidal or suicidal plan  Cognition and Memory: Memory is impaired  Comments: Patient does appear somewhat inattentive at times, but overall seems to be involved in engaged in the discussion  Does have some issues with regard to remembering events, and some processing issues with this  Weapiness at times, again related to her recent medical episode           Luna Barthel, MD

## 2022-11-23 ENCOUNTER — PATIENT OUTREACH (OUTPATIENT)
Dept: FAMILY MEDICINE CLINIC | Facility: CLINIC | Age: 52
End: 2022-11-23

## 2022-11-23 ENCOUNTER — TELEPHONE (OUTPATIENT)
Dept: PSYCHIATRY | Facility: CLINIC | Age: 52
End: 2022-11-23

## 2022-11-23 NOTE — PROGRESS NOTES
OP CM called to pt in regards to referral for psych  Our psych also already left a message for pt to call and schedule

## 2022-11-25 ENCOUNTER — APPOINTMENT (EMERGENCY)
Dept: CT IMAGING | Facility: HOSPITAL | Age: 52
End: 2022-11-25

## 2022-11-25 ENCOUNTER — APPOINTMENT (EMERGENCY)
Dept: NON INVASIVE DIAGNOSTICS | Facility: HOSPITAL | Age: 52
End: 2022-11-25

## 2022-11-25 ENCOUNTER — HOSPITAL ENCOUNTER (INPATIENT)
Facility: HOSPITAL | Age: 52
LOS: 1 days | Discharge: HOME | End: 2022-11-26
Attending: EMERGENCY MEDICINE | Admitting: INTERNAL MEDICINE

## 2022-11-25 ENCOUNTER — APPOINTMENT (EMERGENCY)
Dept: RADIOLOGY | Facility: HOSPITAL | Age: 52
End: 2022-11-25

## 2022-11-25 DIAGNOSIS — M79.671 RIGHT FOOT PAIN: Primary | ICD-10-CM

## 2022-11-25 DIAGNOSIS — F33.2 SEVERE EPISODE OF RECURRENT MAJOR DEPRESSIVE DISORDER, WITHOUT PSYCHOTIC FEATURES (HCC): ICD-10-CM

## 2022-11-25 DIAGNOSIS — M25.571 ACUTE RIGHT ANKLE PAIN: ICD-10-CM

## 2022-11-25 DIAGNOSIS — L03.90 CELLULITIS: ICD-10-CM

## 2022-11-25 LAB
ALBUMIN SERPL BCP-MCNC: 3.2 G/DL (ref 3.5–5)
ALP SERPL-CCNC: 95 U/L (ref 46–116)
ALT SERPL W P-5'-P-CCNC: 20 U/L (ref 12–78)
ANION GAP SERPL CALCULATED.3IONS-SCNC: 6 MMOL/L (ref 4–13)
APTT PPP: 26 SECONDS (ref 23–37)
AST SERPL W P-5'-P-CCNC: 20 U/L (ref 5–45)
ATRIAL RATE: 96 BPM
BACTERIA UR QL AUTO: NORMAL /HPF
BASOPHILS # BLD AUTO: 0.05 THOUSANDS/ÂΜL (ref 0–0.1)
BASOPHILS NFR BLD AUTO: 1 % (ref 0–1)
BILIRUB SERPL-MCNC: 0.17 MG/DL (ref 0.2–1)
BILIRUB UR QL STRIP: NEGATIVE
BUN SERPL-MCNC: 13 MG/DL (ref 5–25)
CALCIUM ALBUM COR SERPL-MCNC: 9.8 MG/DL (ref 8.3–10.1)
CALCIUM SERPL-MCNC: 9.2 MG/DL (ref 8.3–10.1)
CHLORIDE SERPL-SCNC: 102 MMOL/L (ref 96–108)
CLARITY UR: CLEAR
CO2 SERPL-SCNC: 32 MMOL/L (ref 21–32)
COLOR UR: YELLOW
CREAT SERPL-MCNC: 0.91 MG/DL (ref 0.6–1.3)
CRP SERPL QL: <3 MG/L
EOSINOPHIL # BLD AUTO: 0.09 THOUSAND/ÂΜL (ref 0–0.61)
EOSINOPHIL NFR BLD AUTO: 2 % (ref 0–6)
ERYTHROCYTE [DISTWIDTH] IN BLOOD BY AUTOMATED COUNT: 12.8 % (ref 11.6–15.1)
ERYTHROCYTE [SEDIMENTATION RATE] IN BLOOD: 32 MM/HOUR (ref 0–29)
GFR SERPL CREATININE-BSD FRML MDRD: 71 ML/MIN/1.73SQ M
GLUCOSE SERPL-MCNC: 108 MG/DL (ref 65–140)
GLUCOSE UR STRIP-MCNC: NEGATIVE MG/DL
HCT VFR BLD AUTO: 35.3 % (ref 34.8–46.1)
HGB BLD-MCNC: 11.2 G/DL (ref 11.5–15.4)
HGB UR QL STRIP.AUTO: NEGATIVE
IMM GRANULOCYTES # BLD AUTO: 0.01 THOUSAND/UL (ref 0–0.2)
IMM GRANULOCYTES NFR BLD AUTO: 0 % (ref 0–2)
INR PPP: 0.81 (ref 0.84–1.19)
KETONES UR STRIP-MCNC: NEGATIVE MG/DL
LACTATE SERPL-SCNC: 1.6 MMOL/L (ref 0.5–2)
LEUKOCYTE ESTERASE UR QL STRIP: ABNORMAL
LYMPHOCYTES # BLD AUTO: 1.49 THOUSANDS/ÂΜL (ref 0.6–4.47)
LYMPHOCYTES NFR BLD AUTO: 25 % (ref 14–44)
MCH RBC QN AUTO: 31.9 PG (ref 26.8–34.3)
MCHC RBC AUTO-ENTMCNC: 31.7 G/DL (ref 31.4–37.4)
MCV RBC AUTO: 101 FL (ref 82–98)
MONOCYTES # BLD AUTO: 0.28 THOUSAND/ÂΜL (ref 0.17–1.22)
MONOCYTES NFR BLD AUTO: 5 % (ref 4–12)
NEUTROPHILS # BLD AUTO: 3.99 THOUSANDS/ÂΜL (ref 1.85–7.62)
NEUTS SEG NFR BLD AUTO: 67 % (ref 43–75)
NITRITE UR QL STRIP: NEGATIVE
NON-SQ EPI CELLS URNS QL MICRO: NORMAL /HPF
NRBC BLD AUTO-RTO: 0 /100 WBCS
P AXIS: 57 DEGREES
PH UR STRIP.AUTO: 7 [PH]
PLATELET # BLD AUTO: 259 THOUSANDS/UL (ref 149–390)
PLATELET # BLD AUTO: 303 THOUSANDS/UL (ref 149–390)
PMV BLD AUTO: 10.1 FL (ref 8.9–12.7)
PMV BLD AUTO: 10.4 FL (ref 8.9–12.7)
POTASSIUM SERPL-SCNC: 4.5 MMOL/L (ref 3.5–5.3)
PR INTERVAL: 146 MS
PROCALCITONIN SERPL-MCNC: 0.06 NG/ML
PROT SERPL-MCNC: 6.6 G/DL (ref 6.4–8.4)
PROT UR STRIP-MCNC: NEGATIVE MG/DL
PROTHROMBIN TIME: 11.2 SECONDS (ref 11.6–14.5)
QRS AXIS: 79 DEGREES
QRSD INTERVAL: 72 MS
QT INTERVAL: 348 MS
QTC INTERVAL: 439 MS
RBC # BLD AUTO: 3.51 MILLION/UL (ref 3.81–5.12)
RBC #/AREA URNS AUTO: NORMAL /HPF
SODIUM SERPL-SCNC: 140 MMOL/L (ref 135–147)
SP GR UR STRIP.AUTO: <=1.005 (ref 1–1.03)
T WAVE AXIS: 69 DEGREES
URATE SERPL-MCNC: 4.2 MG/DL (ref 2–7.5)
UROBILINOGEN UR QL STRIP.AUTO: 0.2 E.U./DL
VENTRICULAR RATE: 96 BPM
WBC # BLD AUTO: 5.91 THOUSAND/UL (ref 4.31–10.16)
WBC #/AREA URNS AUTO: NORMAL /HPF

## 2022-11-25 RX ORDER — ENOXAPARIN SODIUM 100 MG/ML
40 INJECTION SUBCUTANEOUS DAILY
Status: DISCONTINUED | OUTPATIENT
Start: 2022-11-25 | End: 2022-11-26

## 2022-11-25 RX ORDER — KETOROLAC TROMETHAMINE 30 MG/ML
15 INJECTION, SOLUTION INTRAMUSCULAR; INTRAVENOUS ONCE
Status: COMPLETED | OUTPATIENT
Start: 2022-11-25 | End: 2022-11-25

## 2022-11-25 RX ORDER — MORPHINE SULFATE 4 MG/ML
5 INJECTION, SOLUTION INTRAMUSCULAR; INTRAVENOUS ONCE
Status: COMPLETED | OUTPATIENT
Start: 2022-11-25 | End: 2022-11-25

## 2022-11-25 RX ORDER — METOPROLOL SUCCINATE 25 MG/1
12.5 TABLET, EXTENDED RELEASE ORAL DAILY
Status: DISCONTINUED | OUTPATIENT
Start: 2022-11-25 | End: 2022-11-26 | Stop reason: HOSPADM

## 2022-11-25 RX ORDER — PANTOPRAZOLE SODIUM 40 MG/1
40 TABLET, DELAYED RELEASE ORAL
Status: DISCONTINUED | OUTPATIENT
Start: 2022-11-26 | End: 2022-11-26 | Stop reason: HOSPADM

## 2022-11-25 RX ORDER — GABAPENTIN 300 MG/1
300 CAPSULE ORAL 2 TIMES DAILY
Status: DISCONTINUED | OUTPATIENT
Start: 2022-11-25 | End: 2022-11-26 | Stop reason: HOSPADM

## 2022-11-25 RX ORDER — ACETAMINOPHEN 325 MG/1
975 TABLET ORAL ONCE
Status: COMPLETED | OUTPATIENT
Start: 2022-11-25 | End: 2022-11-25

## 2022-11-25 RX ORDER — SODIUM CHLORIDE 9 MG/ML
125 INJECTION, SOLUTION INTRAVENOUS CONTINUOUS
Status: DISCONTINUED | OUTPATIENT
Start: 2022-11-25 | End: 2022-11-25

## 2022-11-25 RX ORDER — MORPHINE SULFATE 4 MG/ML
6 INJECTION, SOLUTION INTRAMUSCULAR; INTRAVENOUS ONCE
Status: COMPLETED | OUTPATIENT
Start: 2022-11-25 | End: 2022-11-25

## 2022-11-25 RX ORDER — NICOTINE 21 MG/24HR
1 PATCH, TRANSDERMAL 24 HOURS TRANSDERMAL DAILY
Status: DISCONTINUED | OUTPATIENT
Start: 2022-11-25 | End: 2022-11-26 | Stop reason: HOSPADM

## 2022-11-25 RX ORDER — TRAMADOL HYDROCHLORIDE 50 MG/1
50 TABLET ORAL EVERY 6 HOURS PRN
Status: DISCONTINUED | OUTPATIENT
Start: 2022-11-25 | End: 2022-11-26 | Stop reason: HOSPADM

## 2022-11-25 RX ORDER — KETOROLAC TROMETHAMINE 30 MG/ML
15 INJECTION, SOLUTION INTRAMUSCULAR; INTRAVENOUS EVERY 6 HOURS PRN
Status: DISCONTINUED | OUTPATIENT
Start: 2022-11-25 | End: 2022-11-26

## 2022-11-25 RX ORDER — ALPRAZOLAM 0.5 MG/1
0.5 TABLET ORAL
Status: DISCONTINUED | OUTPATIENT
Start: 2022-11-25 | End: 2022-11-26

## 2022-11-25 RX ORDER — ACETAMINOPHEN 325 MG/1
650 TABLET ORAL EVERY 6 HOURS PRN
Status: DISCONTINUED | OUTPATIENT
Start: 2022-11-25 | End: 2022-11-26 | Stop reason: HOSPADM

## 2022-11-25 RX ORDER — SODIUM CHLORIDE 9 MG/ML
150 INJECTION, SOLUTION INTRAVENOUS ONCE
Status: DISCONTINUED | OUTPATIENT
Start: 2022-11-25 | End: 2022-11-25

## 2022-11-25 RX ORDER — QUETIAPINE FUMARATE 25 MG/1
50 TABLET, FILM COATED ORAL 2 TIMES DAILY
Status: DISCONTINUED | OUTPATIENT
Start: 2022-11-25 | End: 2022-11-26 | Stop reason: HOSPADM

## 2022-11-25 RX ORDER — PREDNISONE 20 MG/1
40 TABLET ORAL DAILY
Status: DISCONTINUED | OUTPATIENT
Start: 2022-11-26 | End: 2022-11-26 | Stop reason: HOSPADM

## 2022-11-25 RX ORDER — METHYLPREDNISOLONE SODIUM SUCCINATE 125 MG/2ML
60 INJECTION, POWDER, LYOPHILIZED, FOR SOLUTION INTRAMUSCULAR; INTRAVENOUS ONCE
Status: COMPLETED | OUTPATIENT
Start: 2022-11-25 | End: 2022-11-25

## 2022-11-25 RX ADMIN — Medication 1 PATCH: at 15:54

## 2022-11-25 RX ADMIN — QUETIAPINE FUMARATE 50 MG: 25 TABLET ORAL at 17:15

## 2022-11-25 RX ADMIN — SODIUM CHLORIDE 1000 ML: 0.9 INJECTION, SOLUTION INTRAVENOUS at 08:21

## 2022-11-25 RX ADMIN — KETOROLAC TROMETHAMINE 15 MG: 30 INJECTION, SOLUTION INTRAMUSCULAR; INTRAVENOUS at 20:59

## 2022-11-25 RX ADMIN — KETOROLAC TROMETHAMINE 15 MG: 30 INJECTION, SOLUTION INTRAMUSCULAR; INTRAVENOUS at 08:31

## 2022-11-25 RX ADMIN — GABAPENTIN 300 MG: 300 CAPSULE ORAL at 17:15

## 2022-11-25 RX ADMIN — IOHEXOL 100 ML: 350 INJECTION, SOLUTION INTRAVENOUS at 11:08

## 2022-11-25 RX ADMIN — ALPRAZOLAM 0.5 MG: 0.5 TABLET ORAL at 21:00

## 2022-11-25 RX ADMIN — ACETAMINOPHEN 975 MG: 325 TABLET ORAL at 08:29

## 2022-11-25 RX ADMIN — METOPROLOL SUCCINATE 12.5 MG: 25 TABLET, EXTENDED RELEASE ORAL at 15:54

## 2022-11-25 RX ADMIN — METHYLPREDNISOLONE SODIUM SUCCINATE 60 MG: 125 INJECTION, POWDER, FOR SOLUTION INTRAMUSCULAR; INTRAVENOUS at 15:55

## 2022-11-25 RX ADMIN — CEFTRIAXONE SODIUM 1000 MG: 10 INJECTION, POWDER, FOR SOLUTION INTRAVENOUS at 08:43

## 2022-11-25 RX ADMIN — MORPHINE SULFATE 5 MG: 4 INJECTION INTRAVENOUS at 09:33

## 2022-11-25 RX ADMIN — MORPHINE SULFATE 6 MG: 4 INJECTION INTRAVENOUS at 11:43

## 2022-11-25 RX ADMIN — VENLAFAXINE HYDROCHLORIDE 187.5 MG: 37.5 CAPSULE, EXTENDED RELEASE ORAL at 17:14

## 2022-11-25 RX ADMIN — ENOXAPARIN SODIUM 40 MG: 40 INJECTION SUBCUTANEOUS at 15:55

## 2022-11-25 RX ADMIN — TAPENTADOL HYDROCHLORIDE 50 MG: 50 TABLET, FILM COATED ORAL at 16:09

## 2022-11-25 NOTE — ED ATTENDING ATTESTATION
11/25/2022  IPee MD, saw and evaluated the patient  I have discussed the patient with the resident/non-physician practitioner and agree with the resident's/non-physician practitioner's findings, Plan of Care, and MDM as documented in the resident's/non-physician practitioner's note, except where noted  All available labs and Radiology studies were reviewed  I was present for key portions of any procedure(s) performed by the resident/non-physician practitioner and I was immediately available to provide assistance  At this point I agree with the current assessment done in the Emergency Department  I have conducted an independent evaluation of this patient a history and physical is as follows:  Patient is a 46 yo female, with MSSA bactermeia, was at [facility] until 16, c/o right leg pain for past 5 days, doing PT, more swelling, red, great toe to mid leg, taking tramadol without relief, difficulty walking with walker due to pain, no recent fall, no fever, does report chills, no SOB, no CP  On exam, vitals noted for low BP, tachycardia, Lungs CTA, CVS RRR, Right ankle swelling  Impression: Cellulitis, Ankle arthritis, due to pt's hx, we will check labs, blood cx, lactic, give IVF, contact Podiatry for input  ED Course     Workup results were reviewed, patient was evaluated by Podiatry, CT scan was done, no significant abnormality was noted  Patient was admitted for further evaluation as she had trouble ambulating, dose of antibiotic was given cover for possible cellulitis due to previous history of sepsis      Critical Care Time  Procedures

## 2022-11-25 NOTE — H&P
H&P Exam - Nichole Yu 45 y o  female MRN: [id number]    Unit/Bed#: E2 -01 Encounter: [id number]        History of Present Illness   HPI:  Nichole Yu is a 45 y o  female who presents with swollen red painful right foot  Patient reports approximately 5 days ago right foot and ankle developed pain and then swelling and redness  Aggressive pain limiting ambulation  Patient denies any injury associated  Review of Systems   Constitutional: Negative  HENT: Negative  Respiratory: Negative  Cardiovascular: Negative  Gastrointestinal: Negative  Endocrine: Negative  Genitourinary: Negative  Musculoskeletal: Positive for arthralgias and joint swelling  Skin: Positive for color change  Neurological: Negative  Hematological: Negative  Psychiatric/Behavioral: Negative  Historical Information   Past Medical History:   Diagnosis Date   • Aspiration pneumonitis ([address] ) 10/1/2022   • Bacteremia due to methicillin susceptible Staphylococcus aureus (MSSA) 2022   • Chronic pain disorder    • Depression    • GERD (gastroesophageal reflux disease)    • History of electroconvulsive therapy    • Low back pain    • Self-injurious behavior    • Suicide attempt [facility])      Past Surgical History:   Procedure Laterality Date   •  SECTION     • COLONOSCOPY     • PANCREAS SURGERY      "pseudocysts" per patient's  Ricki Infante   • [place] ESOPHAGOGASTRODUODENOSCOPY TRANSORAL DIAGNOSTIC N/A 4/10/2018    Procedure: EGD AND COLONOSCOPY;  Surgeon: Sarah Mae MD;  [place] [facility];   Service: Gastroenterology     Social History   Social History     Substance and Sexual Activity   Alcohol Use Not Currently    Comment: "occasional glass of wine"     Social History     Substance and Sexual Activity   Drug Use Yes   • Types: Marijuana, Cocaine    Comment: medical, MARIJUANA     Social History     Tobacco Use   Smoking Status Every Day   • Packs/day: 0 50   • Years: 35 00   • Pack years: 17 50   • Types: Cigarettes   Smokeless Tobacco Never     Family History   Problem Relation Age of Onset   • Arthritis Mother    • Coronary artery disease Mother         MI in her 63's   • Parkinsonism Father         with falls and SDH   • Parkinsonism Paternal Grandmother    • Coronary artery disease Paternal Grandfather    • Parkinsonism Paternal Aunt    • Colon cancer Family    • Depression Neg Hx        Meds/Allergies   Medications Prior to Admission   Medication   • ALPRAZolam (XANAX) 0 5 mg tablet   • gabapentin (NEURONTIN) 300 mg capsule   • metoprolol succinate (TOPROL-XL) 25 mg 24 hr tablet   • nicotine (NICODERM CQ) 14 mg/24hr TD 24 hr patch   • omeprazole (PriLOSEC) 40 MG capsule   • QUEtiapine (SEROquel) 50 mg tablet   • saccharomyces boulardii (FLORASTOR) 250 mg capsule   • traMADol (ULTRAM) 50 mg tablet   • naloxone (NARCAN) 4 mg/0 1 mL nasal spray   • venlafaxine (EFFEXOR-XR) 37 5 mg 24 hr capsule     Allergies   Allergen Reactions   • Chantix [Varenicline]    • Ibuprofen Other (See Comments)     Upset stomach   • Lyrica [Pregabalin] Other (See Comments)     bruising   • Penicillins Other (See Comments)     ? hives   • Sulfa Antibiotics Other (See Comments)     sloughing skin in mouth   • Sulfasalazine        Objective   Vitals: Blood pressure 128/80, pulse 90, temperature 98 °F (36 7 °C), temperature source Temporal, resp  rate 18, height 5' 6" (1 676 m), weight 56 2 kg (124 lb), last menstrual period 03/14/2019, SpO2 97 %  Intake/Output Summary (Last 24 hours) at 11/25/2022 1444  Last data filed at 11/25/2022 1021  Gross per 24 hour   Intake 1050 ml   Output --   Net 1050 ml       Invasive Devices     Peripheral Intravenous Line  Duration           Peripheral IV 11/25/22 Left Antecubital <1 day    Peripheral IV 11/25/22 Right Antecubital <1 day                Physical Exam  Constitutional:       Appearance: Normal appearance  HENT:      Head: Normocephalic and atraumatic        Nose: Nose normal       Mouth/Throat:      Mouth: Mucous membranes are moist    Eyes:      Pupils: Pupils are equal, round, and reactive to light  Cardiovascular:      Rate and Rhythm: Normal rate and regular rhythm  Pulmonary:      Effort: Pulmonary effort is normal       Breath sounds: Normal breath sounds  Abdominal:      General: Abdomen is flat  Bowel sounds are normal       Palpations: Abdomen is soft  Musculoskeletal:      Cervical back: Neck supple  Comments: Right foot, diffuse swelling with erythema, tenderness and warmth   Skin:     General: Skin is warm  Neurological:      General: No focal deficit present  Mental Status: She is alert and oriented to person, place, and time     Psychiatric:         Mood and Affect: Mood normal          Behavior: Behavior normal          Lab Results:   Admission on 11/25/2022   Component Date Value   • WBC 11/25/2022 5 91    • RBC 11/25/2022 3 51 (L)    • Hemoglobin 11/25/2022 11 2 (L)    • Hematocrit 11/25/2022 35 3    • MCV 11/25/2022 101 (H)    • MCH 11/25/2022 31 9    • MCHC 11/25/2022 31 7    • RDW 11/25/2022 12 8    • MPV 11/25/2022 10 4    • Platelets 55/38/3601 303    • nRBC 11/25/2022 0    • Neutrophils Relative 11/25/2022 67    • Immat GRANS % 11/25/2022 0    • Lymphocytes Relative 11/25/2022 25    • Monocytes Relative 11/25/2022 5    • Eosinophils Relative 11/25/2022 2    • Basophils Relative 11/25/2022 1    • Neutrophils Absolute 11/25/2022 3 99    • Immature Grans Absolute 11/25/2022 0 01    • Lymphocytes Absolute 11/25/2022 1 49    • Monocytes Absolute 11/25/2022 0 28    • Eosinophils Absolute 11/25/2022 0 09    • Basophils Absolute 11/25/2022 0 05    • Sodium 11/25/2022 140    • Potassium 11/25/2022 4 5    • Chloride 11/25/2022 102    • CO2 11/25/2022 32    • ANION GAP 11/25/2022 6    • BUN 11/25/2022 13    • Creatinine 11/25/2022 0 91    • Glucose 11/25/2022 108    • Calcium 11/25/2022 9 2    • Corrected Calcium 11/25/2022 9 8    • AST 11/25/2022 20    • ALT 11/25/2022 20    • Alkaline Phosphatase 11/25/2022 95    • Total Protein 11/25/2022 6 6    • Albumin 11/25/2022 3 2 (L)    • Total Bilirubin 11/25/2022 0 17 (L)    • eGFR 11/25/2022 71    • LACTIC ACID 11/25/2022 1 6    • Procalcitonin 11/25/2022 0 06    • Protime 11/25/2022 11 2 (L)    • INR 11/25/2022 0 81 (L)    • PTT 11/25/2022 26    • Blood Culture 11/25/2022 Received in Microbiology Lab  Culture in Progress  • Color, UA 11/25/2022 Yellow    • Clarity, UA 11/25/2022 Clear    • Specific Gravity, UA 11/25/2022 <=1 005    • pH, UA 11/25/2022 7 0    • Leukocytes, UA 11/25/2022 Small (A)    • Nitrite, UA 11/25/2022 Negative    • Protein, UA 11/25/2022 Negative    • Glucose, UA 11/25/2022 Negative    • Ketones, UA 11/25/2022 Negative    • Urobilinogen, UA 11/25/2022 0 2    • Bilirubin, UA 11/25/2022 Negative    • Occult Blood, UA 11/25/2022 Negative    • Sed Rate 11/25/2022 32 (H)    • CRP 11/25/2022 <3 0    • Ventricular Rate 11/25/2022 96    • Atrial Rate 11/25/2022 96    • NM Interval 11/25/2022 146    • QRSD Interval 11/25/2022 72    • QT Interval 11/25/2022 348    • QTC Interval 11/25/2022 439    • P Axis 11/25/2022 57    • QRS Axis 11/25/2022 79    • T Wave Axis 11/25/2022 69    • RBC, UA 11/25/2022 None Seen    • WBC, UA 11/25/2022 2-4    • Epithelial Cells 11/25/2022 Occasional    • Bacteria, UA 11/25/2022 Occasional      Imaging: XR foot 3+ views RIGHT    Result Date: 11/25/2022  Narrative: RIGHT FOOT INDICATION:  Right foot pain and swelling  COMPARISON:  9/19/2022 VIEWS:  XR FOOT 3+ VW RIGHT Images: 3 FINDINGS: There is no acute fracture or dislocation  Mild osteoarthritis 1st MTP joint  No lytic or blastic osseous lesion  Mild dorsal soft tissue swelling  Impression: No acute osseous abnormality  Dorsal soft tissue swelling   Workstation performed: [id number]     VAS lower limb venous duplex study, unilateral/limited    Result Date: 11/25/2022  Narrative:  [address] REPORT CLINICAL: Indications: Patient presents with right foot pain for almost two weeks and swelling of the foot for five days  Operative History: No cardiovascular surgeries noted  Risk Factors The patient has history of HTN, COPD and smoking (current) 0 5 ppd  CONCLUSION:  Impression: RIGHT LOWER LIMB No evidence of acute or chronic deep vein thrombosis  No evidence of superficial thrombophlebitis noted  Doppler evaluation shows a normal response to augmentation maneuvers  Popliteal, posterior tibial and anterior tibial arterial Doppler waveforms are biphasic/hyperemic  LEFT LOWER LIMB LIMITED Evaluation shows no evidence of thrombus in the common femoral vein  Doppler evaluation shows a normal response to augmentation maneuvers  Technical findings were given to Dr Linus Ramos via [employer] at time of exam on 11/25/22  SIGNATURE: Electronically Signed by: Amarilys Boyer DO on 2022-11-25 12:28:29 PM    CT lower extremity w contrast right    Result Date: 11/25/2022  Narrative: CT right lower extremity with IV contrast INDICATION: Foot swelling, nondiabetic, osteomyelitis suspected RLE pain, swelling, concern for septic joint vs osteomyelitis, request per podiatry  COMPARISON: X-ray right foot dated November 25, 2022  TECHNIQUE: CT examination of the right lower extremity was performed  This examination, like all CT scans performed in the [facility], was performed utilizing techniques to minimize radiation dose exposure, including the use of iterative reconstruction and automated exposure control software  Sagittal and coronal two dimensional reconstructed images were also submitted for interpretation  IV Contrast: 100 mL of iohexol (OMNIPAQUE) Rad dose  467 mGy-cm FINDINGS: OSSEOUS STRUCTURES:  No fracture, dislocation or destructive osseous lesion  VISUALIZED MUSCULATURE:  Unremarkable  SOFT TISSUES:  Diffuse subcutaneous edema in the right lower leg and foot  No localized fluid collection    No soft tissue gas  Impression: Diffuse subcutaneous edema in the right lower leg and foot  No localized fluid collection or soft tissue gas  No CT evidence of osteomyelitis  Workstation performed: [id number]     EKG, Pathology, and Other Studies: I have personally reviewed pertinent reports  Assessment/Plan     Right ankle pain  imaging negative for DVT, CT diffuse subcutaneous edema no localized fluid collection or soft tissue gas no evidence of osteomyelitis, concern for gout versus pseudogout  Will initiate steroids with pain control and check uric acid  Will also request PT eval    Tobacco abuse  cessation counseling provided continue nicotine patch    MDDAnxiety   continue Seroquel and Effexor, with p r n   Xanax    GERD    continue PPI for acid control        Expect greater than 2 midnight stay admission discussed with

## 2022-11-25 NOTE — CONSULTS
Podiatry - Consultation    Patient Information:   Yasemin Alcantara 45 y o  female MRN: [id number]  Unit/Bed#: ED 32 Encounter: [id number]  PCP: Ronald Lanes, MD  Date of Admission:  11/25/2022  Date of Consultation: 11/25/22  Requesting Physician: Fahad Tan MD      ASSESSMENT:    Yasemin Alcantara is a 45 y o  female with:    1  Right ankle pain  2  Ambulatory dysfunction   3  Hx Tobacco abuse    PLAN:    · Patient seen and evaluated in ED, low suspicion for septic ankle joint at this time, will follow up final CT read  Possible RSD or chronic pain syndrome secondary to previous fall with ankle injury  · Labs reviewed, within normal limits except for slightly elevated ESR  Will continue to trend labs/vitals, however low concern for infectious process at this time  · Pending final CT read, patient is stable for follow with podiatry outpatient  · Elevation and offloading on green foam wedges or pillows when non-ambulatory  · Rest of care per primary team   · Will discuss this plan with my attending and update as needed  Weightbearing status: Weightbearing as tolerated    SUBJECTIVE:    History of Present Illness:    Yasemin Alcantara is a 45 y o  female who is originally admitted 11/25/2022 due to ambulatory dysfunction  Patient has an extensive past medical history including irritable bowel syndrome, hypertension, hyperlipidemia, depression, and COPD  We are consulted for right ankle pain, with suspicion for possible septic arthirtis  Patient states that she has had this pain and swelling in her ankle for the past couple of days, and denies any open wounds or recent trauma  She states that she did fall back in mid September, and was admitted at that time  She denies any fractures or wounds from that fall, and states that she just had some residual ankle pain at that time  She also admits to occasional numbness in her toes on the affected extremity   She denies fever, chills, nausea, vomiting, shortness of breath, and chest pain  No other pedal complaints at this time  Review of Systems:    Constitutional: Negative  HENT: Negative  Eyes: Negative  Respiratory: Negative  Cardiovascular: Negative  Gastrointestinal: Negative  Musculoskeletal: right ankle pain   Skin: negative    Neurological: occasional numbness   Psych: Negative  Past Medical and Surgical History:     Past Medical History:   Diagnosis Date   • Aspiration pneumonitis ([address] ) 10/1/2022   • Bacteremia due to methicillin susceptible Staphylococcus aureus (MSSA) 2022   • Chronic pain disorder    • Depression    • GERD (gastroesophageal reflux disease)    • History of electroconvulsive therapy    • Low back pain    • Self-injurious behavior    • Suicide attempt [facility])        Past Surgical History:   Procedure Laterality Date   •  SECTION     • COLONOSCOPY     • PANCREAS SURGERY      "pseudocysts" per patient's  Ricki Infante   • [place] ESOPHAGOGASTRODUODENOSCOPY TRANSORAL DIAGNOSTIC N/A 4/10/2018    Procedure: EGD AND COLONOSCOPY;  Surgeon: Gerardo Wells MD;  [place] [facility];   Service: Gastroenterology       Meds/Allergies:    (Not in a hospital admission)      Allergies   Allergen Reactions   • Chantix [Varenicline]    • Ibuprofen Other (See Comments)     Upset stomach   • Lyrica [Pregabalin] Other (See Comments)     bruising   • Penicillins Other (See Comments)     ? hives   • Sulfa Antibiotics Other (See Comments)     sloughing skin in mouth   • Sulfasalazine        Social History:     Marital Status: /Civil Union    Substance Use History:   Social History     Substance and Sexual Activity   Alcohol Use Not Currently    Comment: "occasional glass of wine"     Social History     Tobacco Use   Smoking Status Every Day   • Packs/day: 0 50   • Years: 35 00   • Pack years: 17 50   • Types: Cigarettes   Smokeless Tobacco Never     Social History     Substance and Sexual Activity   Drug Use Yes   • Types: Marijuana, Cocaine    Comment: medical, MARIJUANA       Family History:    Family History   Problem Relation Age of Onset   • Arthritis Mother    • Coronary artery disease Mother         MI in her 63's   • Parkinsonism Father         with falls and SDH   • Parkinsonism Paternal Grandmother    • Coronary artery disease Paternal Grandfather    • Parkinsonism Paternal Aunt    • Colon cancer Family    • Depression Neg Hx          OBJECTIVE:    Vitals:   Blood Pressure: 123/71 (11/25/22 1145)  Pulse: 90 (11/25/22 1145)  Temperature: 97 5 °F (36 4 °C) (11/25/22 0746)  Respirations: 18 (11/25/22 1145)  Weight - Scale: 56 3 kg (124 lb 1 9 oz) (11/25/22 0746)  SpO2: 98 % (11/25/22 1145)    Physical Exam:    General Appearance: Alert, cooperative, no distress  HEENT: Head normocephalic, atraumatic, without obvious abnormality  Heart: Normal rate and rhythm  Lungs: Non-labored breathing  No respiratory distress  Abdomen: Without distension  Psychiatric: AAOx3  Lower Extremity:    Vascular:   DP: weakly palpable bilaterally  PT: weakly palpable bilaterally  CRT < 3 seconds at the digits  +2/4 edema noted at right lower extremity  Pedal hair is absent  Skin temperature is warm bilaterally  Musculoskeletal:  MMT is 4/5 in all muscle compartments bilaterally  ROM at the 1st MPJ and ankle joint are decreased  Pain on palpation of the right ankle medially and laterally, with pain on ROM  No gross deformities noted  No calf tenderness on palpation  Dermatological:  - Skin is warm, dry, and intact    - No open wounds or lesions present bilaterally  - Capillary refill < 3 seconds  Neurological:  Gross sensation is intact  Light touch is diminished  Protective sensation is diminished      Clinical Images 11/25/22:    Additional data:     Lab Results: I have personally reviewed pertinent labs including:    Results from last 7 days   Lab Units 11/25/22  0822   WBC Thousand/uL 5 91   HEMOGLOBIN g/dL 11 2*   HEMATOCRIT % 35 3   PLATELETS Thousands/uL 303   NEUTROS PCT % 67   LYMPHS PCT % 25   MONOS PCT % 5   EOS PCT % 2     Results from last 7 days   Lab Units 11/25/22  0857   POTASSIUM mmol/L 4 5   CHLORIDE mmol/L 102   CO2 mmol/L 32   BUN mg/dL 13   CREATININE mg/dL 0 91   CALCIUM mg/dL 9 2   ALK PHOS U/L 95   ALT U/L 20   AST U/L 20     Results from last 7 days   Lab Units 11/25/22  0822   INR  0 81*       Cultures: I have personally reviewed pertinent cultures including:              Imaging: I have personally reviewed pertinent reports in PACS  EKG, Pathology, and Other Studies: I have personally reviewed pertinent reports  Time Spent for Care: 30 minutes  More than 50% of total time spent on counseling and coordination of care as described above  ** Please Note: Portions of the record may have been created with voice recognition software  Occasional wrong word or "sound a like" substitutions may have occurred due to the inherent limitations of voice recognition software  Read the chart carefully and recognize, using context, where substitutions have occurred   **

## 2022-11-25 NOTE — ED PROVIDER NOTES
History  Chief Complaint   Patient presents with   • Foot Swelling     Pt c/o right foot swelling since Sunday and painful cant walk on it, did have a fall in September was in the hospital for about 9 weeks for that between medical and rehabilitation  Denies new injury  Denies fevers       20-year-old female past medical history of hypertension, anxiety/depression, chronic pain, known right foot pain following injury presents the ED complaining of 5 days of worsening right lower extremity pain and swelling  Patient states that she was recently hospitalized at the end of October for a fall secondary to altered mental status/toxic metabolic encephalopathy  On that hospitalization, she was found to have MSSA bacteremia secondary to phlebitis and completed a course of antibiotics  She was discharged to [facility] and stayed there from 11/07 to 11/17  She was worked up for right footdrop versus plantar fasciitis contributing to her right lower extremity pain  Patient states that the pain she is experiencing now started on approximately 11/19 and has gradually worsened  Pain, erythema and swelling began on her right great toe and has spread to her right mid calf  Patient states she was seen by her PCP on 11/22 at which point patient was told to continue physical therapy for the pain  Patient has been taking her home tramadol now without relief of pain, last dose was yesterday  Until today, patient has been able to tolerate bearing weight and has been walking with her walker  Today, pain has become so severe that she has pain to any light touch, has not been able to fit her shoes on secondary to swelling and pain  Patient sources chills but no fevers during this time review of systems otherwise negative specifically no nausea or vomiting no diarrhea constipation no cough chest pain or shortness of breath  Prior to Admission Medications   Prescriptions Last Dose Informant Patient Reported? Taking? ALPRAZolam (XANAX) 0 5 mg tablet Past Week  No Yes   Sig: Take 1 tablet (0 5 mg total) by mouth daily at bedtime as needed for anxiety or sleep for up to 15 days   QUEtiapine (SEROquel) 50 mg tablet 2022  No Yes   Sig: Take 1 tablet (50 mg total) by mouth 2 (two) times a day   gabapentin (NEURONTIN) 300 mg capsule 2022  Yes Yes   Sig: Take 300 mg by mouth 2 (two) times a day   metoprolol succinate (TOPROL-XL) 25 mg 24 hr tablet 2022  No Yes   Sig: Take 0 5 tablets (12 5 mg total) by mouth daily   naloxone (NARCAN) 4 mg/0 1 mL nasal spray Not Taking  No No   Sig: Administer 1 spray into a nostril  If no response after 2-3 minutes, give another dose in the other nostril using a new spray  Patient not taking: Reported on 2022   nicotine (NICODERM CQ) 14 mg/24hr TD 24 hr patch Past Month  No Yes   Sig: Place 1 patch on the skin daily Do not start before 2022  omeprazole (PriLOSEC) 40 MG capsule 2022  No Yes   Sig: Take 1 capsule (40 mg total) by mouth daily   saccharomyces boulardii (FLORASTOR) 250 mg capsule 2022  No Yes   Sig: Take 1 capsule (250 mg total) by mouth 2 (two) times a day   traMADol (ULTRAM) 50 mg tablet 2022  Yes Yes   venlafaxine (EFFEXOR-XR) 37 5 mg 24 hr capsule   No No   Sig: Take 5 capsules (187 5 mg total) by mouth daily Do not start before 2022     Patient taking differently: Take 187 5 mg by mouth daily Pt takes one a day      Facility-Administered Medications: None       Past Medical History:   Diagnosis Date   • Aspiration pneumonitis ([facility] [address] ) 10/1/2022   • Bacteremia due to methicillin susceptible Staphylococcus aureus (MSSA) 2022   • Chronic pain disorder    • Depression    • GERD (gastroesophageal reflux disease)    • History of electroconvulsive therapy    • Low back pain    • Self-injurious behavior    • Suicide attempt [facility])        Past Surgical History:   Procedure Laterality Date   •  SECTION     • COLONOSCOPY     • PANCREAS SURGERY      "pseudocysts" per patient's  Ricki Infante   • MD ESOPHAGOGASTRODUODENOSCOPY TRANSORAL DIAGNOSTIC N/A 4/10/2018    Procedure: EGD AND COLONOSCOPY;  Surgeon: Sandie Grimaldo MD;  [place] [facility]; Service: Gastroenterology       Family History   Problem Relation Age of Onset   • Arthritis Mother    • Coronary artery disease Mother         MI in her 63's   • Parkinsonism Father         with falls and SDH   • Parkinsonism Paternal Grandmother    • Coronary artery disease Paternal Grandfather    • Parkinsonism Paternal Aunt    • Colon cancer Family    • Depression Neg Hx      I have reviewed and agree with the history as documented  E-Cigarette/Vaping   • E-Cigarette Use Never User      E-Cigarette/Vaping Substances   • Nicotine No    • THC No    • CBD No    • Flavoring No    • Other No    • Unknown No      Social History     Tobacco Use   • Smoking status: Every Day     Packs/day: 0 50     Years: 35 00     Pack years: 17 50     Types: Cigarettes   • Smokeless tobacco: Never   Vaping Use   • Vaping Use: Never used   Substance Use Topics   • Alcohol use: Not Currently     Comment: "occasional glass of wine"   • Drug use: Yes     Types: Marijuana, Cocaine     Comment: medical, MARIJUANA        Review of Systems   Constitutional: Positive for chills  Negative for fever  HENT: Negative for ear pain and sore throat  Eyes: Negative for pain and visual disturbance  Respiratory: Negative for cough and shortness of breath  Cardiovascular: Positive for leg swelling  Negative for chest pain and palpitations  Gastrointestinal: Negative for abdominal pain and vomiting  Genitourinary: Negative for dysuria and hematuria  Musculoskeletal: Negative for arthralgias and back pain  Skin: Positive for color change  Negative for rash  Neurological: Negative for seizures and syncope  All other systems reviewed and are negative        Physical Exam  ED Triage Vitals Temperature Pulse Respirations Blood Pressure SpO2   22 0746 22 0746 22 0746 22 0746 22 0746   97 5 °F (36 4 °C) (!) 113 18 95/57 99 %      Temp src Heart Rate Source Patient Position - Orthostatic VS BP Location FiO2 (%)   -- 22 0818 22 0746 22 0746 --    Monitor Sitting Right arm       Pain Score       22 0746       8             Orthostatic Vital Signs  Vitals:    22 0936 22 1045 22 1123 22 1145   BP: 102/69 106/67 106/52 123/71   Pulse: 97 96 89 90   Patient Position - Orthostatic VS: Lying  Lying Lying       Physical Exam  Vitals and nursing note reviewed  Constitutional:       General: She is not in acute distress  Appearance: She is well-developed  HENT:      Head: Normocephalic and atraumatic  Nose: No congestion or rhinorrhea  Mouth/Throat:      Mouth: Mucous membranes are moist       Pharynx: Oropharynx is clear  Eyes:      General:         Right eye: No discharge  Left eye: No discharge  Conjunctiva/sclera: Conjunctivae normal    Cardiovascular:      Rate and Rhythm: Regular rhythm  Tachycardia present  Pulses: Decreased pulses  Dorsalis pedis pulses are 1+ on the right side and 1+ on the left side  Posterior tibial pulses are 1+ on the right side and 1+ on the left side  Heart sounds: No murmur heard  Comments: PT and DP pulses difficult to appreciate secondary to pedal edema  Pulmonary:      Effort: Pulmonary effort is normal  No respiratory distress  Breath sounds: Normal breath sounds  Abdominal:      Palpations: Abdomen is soft  Tenderness: There is no abdominal tenderness  Musculoskeletal:         General: No swelling  Cervical back: Neck supple  Right lower le+ Pitting Edema present  Left lower le+ Pitting Edema present  Skin:     General: Skin is warm and dry        Capillary Refill: Capillary refill takes less than 2 seconds  Comments: Right lower extremity mild erythema present to the entire foot approximately to the ankle  Neurological:      Mental Status: She is alert and oriented to person, place, and time     Psychiatric:         Mood and Affect: Mood normal          ED Medications  Medications   sodium chloride 0 9 % bolus 1,000 mL (0 mL Intravenous Stopped 11/25/22 1021)   acetaminophen (TYLENOL) tablet 975 mg (975 mg Oral Given 11/25/22 0829)   ketorolac (TORADOL) injection 15 mg (15 mg Intravenous Given 11/25/22 0831)   ceftriaxone (ROCEPHIN) 1 g/50 mL in dextrose IVPB (0 mg Intravenous Stopped 11/25/22 0913)   morphine injection 5 mg (5 mg Intravenous Given 11/25/22 0933)   iohexol (OMNIPAQUE) 350 MG/ML injection (SINGLE-DOSE) 100 mL (100 mL Intravenous Given 11/25/22 1108)   morphine injection 6 mg (6 mg Intravenous Given 11/25/22 1143)       Diagnostic Studies  Results Reviewed     Procedure Component Value Units Date/Time    C-reactive protein [[id number]]  (Normal) Collected: 11/25/22 0857    Lab Status: Final result Specimen: Blood from Arm, Left Updated: 11/25/22 1014     CRP <3 0 mg/L     Comprehensive metabolic panel [[id number]]  (Abnormal) Collected: 11/25/22 0857    Lab Status: Final result Specimen: Blood from Arm, Left Updated: 11/25/22 1005     Sodium 140 mmol/L      Potassium 4 5 mmol/L      Chloride 102 mmol/L      CO2 32 mmol/L      ANION GAP 6 mmol/L      BUN 13 mg/dL      Creatinine 0 91 mg/dL      Glucose 108 mg/dL      Calcium 9 2 mg/dL      Corrected Calcium 9 8 mg/dL      AST 20 U/L      ALT 20 U/L      Alkaline Phosphatase 95 U/L      Total Protein 6 6 g/dL      Albumin 3 2 g/dL      Total Bilirubin 0 17 mg/dL      eGFR 71 ml/min/1 73sq m     Narrative:      [place] guidelines for Chronic Kidney Disease (CKD):   •  Stage 1 with normal or high GFR (GFR > 90 mL/min/1 73 square meters)  •  Stage 2 Mild CKD (GFR = 60-89 mL/min/1 73 square meters)  •  Stage 3A Moderate CKD (GFR = 45-59 mL/min/1 73 square meters)  •  Stage 3B Moderate CKD (GFR = 30-44 mL/min/1 73 square meters)  •  Stage 4 Severe CKD (GFR = 15-29 mL/min/1 73 square meters)  •  Stage 5 End Stage CKD (GFR <15 mL/min/1 73 square meters)  Note: GFR calculation is accurate only with a steady state creatinine    Procalcitonin [[id number]]  (Normal) Collected: 11/25/22 0822    Lab Status: Final result Specimen: Blood from Arm, Left Updated: 11/25/22 0942     Procalcitonin 0 06 ng/ml     Sedimentation rate, automated [[id number]]  (Abnormal) Collected: 11/25/22 0822    Lab Status: Final result Specimen: Blood from Arm, Left Updated: 11/25/22 0937     Sed Rate 32 mm/hour     Protime-INR [[id number]]  (Abnormal) Collected: 11/25/22 0822    Lab Status: Final result Specimen: Blood from Arm, Left Updated: 11/25/22 0932     Protime 11 2 seconds      INR 0 81    APTT [[id number]]  (Normal) Collected: 11/25/22 0822    Lab Status: Final result Specimen: Blood from Arm, Left Updated: 11/25/22 0932     PTT 26 seconds     CBC and differential [[id number]]  (Abnormal) Collected: 11/25/22 0822    Lab Status: Final result Specimen: Blood from Arm, Left Updated: 11/25/22 0913     WBC 5 91 Thousand/uL      RBC 3 51 Million/uL      Hemoglobin 11 2 g/dL      Hematocrit 35 3 %       fL      MCH 31 9 pg      MCHC 31 7 g/dL      RDW 12 8 %      MPV 10 4 fL      Platelets 860 Thousands/uL      nRBC 0 /100 WBCs      Neutrophils Relative 67 %      Immat GRANS % 0 %      Lymphocytes Relative 25 %      Monocytes Relative 5 %      Eosinophils Relative 2 %      Basophils Relative 1 %      Neutrophils Absolute 3 99 Thousands/µL      Immature Grans Absolute 0 01 Thousand/uL      Lymphocytes Absolute 1 49 Thousands/µL      Monocytes Absolute 0 28 Thousand/µL      Eosinophils Absolute 0 09 Thousand/µL      Basophils Absolute 0 05 Thousands/µL     Blood culture #1 [[id number]] Collected: 11/25/22 1165    Lab Status:  In process Specimen: Blood from Arm, Left Updated: 11/25/22 0911    Lactic acid [[id number]]  (Normal) Collected: 11/25/22 0841    Lab Status: Final result Specimen: Blood from Arm, Left Updated: 11/25/22 0906     LACTIC ACID 1 6 mmol/L     Narrative:      Result may be elevated if tourniquet was used during collection  Blood culture #2 [[id number]] Collected: 11/25/22 0841    Lab Status: In process Specimen: Blood from Arm, Left Updated: 11/25/22 0844    UA w Reflex to Microscopic w Reflex to Culture [[id number]]     Lab Status: No result Specimen: Urine                  CT lower extremity w contrast right   Final Result by Tomer Ortez MD (11/25 1143)      Diffuse subcutaneous edema in the right lower leg and foot  No localized fluid collection or soft tissue gas  No CT evidence of osteomyelitis  Workstation performed: [id number]         XR foot 3+ views RIGHT   Final Result by Magali Gomez DO (11/25 1011)      No acute osseous abnormality  Dorsal soft tissue swelling  Workstation performed: [id number]         VAS lower limb venous duplex study, unilateral/limited    (Results Pending)         Procedures  ECG 12 Lead Documentation Only    Date/Time: 11/25/2022 9:37 AM  Performed by: Millie Sorto MD  Authorized by: Millie Sorto MD     ECG reviewed by me, the ED Provider: yes    Patient location:  ED  Previous ECG:     Previous ECG:  Compared to current    Similarity:  No change    Comparison to cardiac monitor: Yes    Interpretation:     Interpretation: abnormal    Rate:     ECG rate:  96    ECG rate assessment: normal    Rhythm:     Rhythm: sinus rhythm    Ectopy:     Ectopy: none    QRS:     QRS axis:  Normal  Conduction:     Conduction: normal    ST segments:     ST segments:  Normal  T waves:     T waves: normal            ED Course  ED Course as of 11/25/22 1203   Fri Nov 25, 2022   0924 Contacted podiatry for evaluation of possible septic joint      0128 Duplex results: preliminary is negative for DVT in the right leg  1135 Per podiatry: will await CT lower extremity read, and will tap ankle    1148 No arthrocentesis per podiatry  [address] [place] [address] for admission                              SBIRT 22yo+    Flowsheet Row Most Recent Value   SBIRT (25 yo +)    In order to provide better care to our patients, we are screening all of our patients for alcohol and drug use  Would it be okay to ask you these screening questions? No Filed at: 11/25/2022 0927                [facility]  Number of Diagnoses or Management Options  Cellulitis  Right foot pain  Diagnosis management comments: 78-year-old female past medical history of hypertension, anxiety/depression, chronic pain, known right foot pain following injury presents the ED complaining of 5 days of worsening right lower extremity pain and swelling  DDx: Cellulitis, DVT, osteomyelitis, complex regional pain, plantar fascitis  Patient with tachycardia, relative hypotension, will obtain sepsis work-up and treat with ceftriaxone for likely cellulitis  Patient with history of recent MSSA bacteremia, pan-sensitive secondary to phlebitis  Will rule out DVT with lower extremity duplex, and will investigate for possible underlying osteomyelitis or septic joint causing pain with foot X-ray and inflammatory markers  Patient SIRs negative, no elevation in lactate, no sepsis treatment necessary in this case  Case discussed with podiatry regarding possible septic joint causing pain, podiatry requested CT lower extremity with contrast  Imaging reviewed by podiatry, stating unlikely septic joint, not requiring arthrocentesis currently  Case discussed with medicine, patient admitted for ambulatory dysfunction, possible cellulitis and need for IV antibiotics and further management         Disposition  Final diagnoses:   Right foot pain   Cellulitis     Time reflects when diagnosis was documented in both MDM as applicable and the Disposition within this note Time User Action Codes Description Comment    11/25/2022 10:21 AM Myriam Ashwini Meléndez [K25 603] Right foot pain     11/25/2022 11:53 AM Myriam Maradiaga Add [L03 90] Cellulitis       ED Disposition     ED Disposition   Admit    Condition   Stable    Date/Time   Fri Nov 25, 2022 11:53 AM    Comment   Case was discussed with Dr Taisha Quiles and the patient's admission status was agreed to be Admission Status: inpatient status to the service of Dr Taisha Quiles  Follow-up Information    None         Patient's Medications   Discharge Prescriptions    No medications on file     No discharge procedures on file  PDMP Review       Value Time User    PDMP Reviewed  Yes 11/22/2022  2:13 PM Rosalind Angel MD           ED Provider  Attending physically available and evaluated Gildardo Parminder MEEK managed the patient along with the ED Attending      Electronically Signed by         Olga Kauffman MD  11/25/22 4261

## 2022-11-26 VITALS
OXYGEN SATURATION: 98 % | BODY MASS INDEX: 19.93 KG/M2 | HEIGHT: 66 IN | DIASTOLIC BLOOD PRESSURE: 77 MMHG | WEIGHT: 124 LBS | SYSTOLIC BLOOD PRESSURE: 120 MMHG | HEART RATE: 113 BPM | RESPIRATION RATE: 20 BRPM | TEMPERATURE: 99.7 F

## 2022-11-26 RX ORDER — COLCHICINE 0.6 MG/1
0.6 TABLET ORAL 2 TIMES DAILY
Qty: 14 TABLET | Refills: 0 | Status: SHIPPED | OUTPATIENT
Start: 2022-11-26

## 2022-11-26 RX ORDER — KETOROLAC TROMETHAMINE 30 MG/ML
15 INJECTION, SOLUTION INTRAMUSCULAR; INTRAVENOUS EVERY 6 HOURS PRN
Status: DISCONTINUED | OUTPATIENT
Start: 2022-11-26 | End: 2022-11-26

## 2022-11-26 RX ORDER — VENLAFAXINE HYDROCHLORIDE 37.5 MG/1
187.5 CAPSULE, EXTENDED RELEASE ORAL DAILY
Start: 2022-11-26 | End: 2022-12-26

## 2022-11-26 RX ORDER — COLCHICINE 0.6 MG/1
0.6 TABLET ORAL 2 TIMES DAILY
Status: DISCONTINUED | OUTPATIENT
Start: 2022-11-26 | End: 2022-11-26 | Stop reason: HOSPADM

## 2022-11-26 RX ORDER — TRAMADOL HYDROCHLORIDE 50 MG/1
50 TABLET ORAL EVERY 6 HOURS PRN
Qty: 20 TABLET | Refills: 0 | Status: SHIPPED | OUTPATIENT
Start: 2022-11-26 | End: 2022-12-06

## 2022-11-26 RX ORDER — PREDNISONE 10 MG/1
TABLET ORAL
Qty: 22 TABLET | Refills: 0 | Status: SHIPPED | OUTPATIENT
Start: 2022-11-26 | End: 2022-12-06

## 2022-11-26 RX ADMIN — PANTOPRAZOLE SODIUM 40 MG: 40 TABLET, DELAYED RELEASE ORAL at 05:48

## 2022-11-26 RX ADMIN — TAPENTADOL HYDROCHLORIDE 50 MG: 50 TABLET, FILM COATED ORAL at 01:22

## 2022-11-26 RX ADMIN — QUETIAPINE FUMARATE 50 MG: 25 TABLET ORAL at 08:50

## 2022-11-26 RX ADMIN — ENOXAPARIN SODIUM 40 MG: 40 INJECTION SUBCUTANEOUS at 08:51

## 2022-11-26 RX ADMIN — COLCHICINE 0.6 MG: 0.6 TABLET, FILM COATED ORAL at 11:39

## 2022-11-26 RX ADMIN — VENLAFAXINE HYDROCHLORIDE 187.5 MG: 37.5 CAPSULE, EXTENDED RELEASE ORAL at 08:50

## 2022-11-26 RX ADMIN — Medication 1 PATCH: at 08:51

## 2022-11-26 RX ADMIN — TAPENTADOL HYDROCHLORIDE 50 MG: 50 TABLET, FILM COATED ORAL at 09:34

## 2022-11-26 RX ADMIN — GABAPENTIN 300 MG: 300 CAPSULE ORAL at 08:50

## 2022-11-26 RX ADMIN — PREDNISONE 40 MG: 20 TABLET ORAL at 08:51

## 2022-11-26 RX ADMIN — METOPROLOL SUCCINATE 12.5 MG: 25 TABLET, EXTENDED RELEASE ORAL at 08:50

## 2022-11-26 NOTE — PROGRESS NOTES
Progress Note - Nimco Hilton 45 y o  female MRN: [id number]    Unit/Bed#: E2 -01 Encounter: [id number]    Assessment/Plan:    Acute gout   patient has had major improvement with IV steroid, transition to p o  steroid, will initiate colchicine    Tobacco abuse  cessation counseling provided    MDD/anxiety   controlled with Seroquel Effexor and p r n  Xanax    GERD    continue PPI for acid control    Subjective:   pain of right foot much less, denies shortness of breath no chest pain no nausea vomiting no fevers chills appetite good      Objective:     Vitals: Blood pressure 120/77, pulse (!) 113, temperature 99 7 °F (37 6 °C), temperature source Temporal, resp  rate 20, height 5' 6" (1 676 m), weight 56 2 kg (124 lb), last menstrual period 03/14/2019, SpO2 98 %  ,Body mass index is 20 01 kg/m²        Results from last 7 days   Lab Units 11/25/22  1554 11/25/22  0822   WBC Thousand/uL  --  5 91   HEMOGLOBIN g/dL  --  11 2*   HEMATOCRIT %  --  35 3   PLATELETS Thousands/uL 259 303   INR   --  0 81*     Results from last 7 days   Lab Units 11/25/22  0857   POTASSIUM mmol/L 4 5   CHLORIDE mmol/L 102   CO2 mmol/L 32   BUN mg/dL 13   CREATININE mg/dL 0 91   CALCIUM mg/dL 9 2   ALK PHOS U/L 95   ALT U/L 20   AST U/L 20       Scheduled Meds:  Current Facility-Administered Medications   Medication Dose Route Frequency Provider Last Rate   • acetaminophen  650 mg Oral Q6H PRN Mariusz Hughes, DO     • ALPRAZolam  0 5 mg Oral HS PRN Mariusz Hughes, DO     • enoxaparin  40 mg Subcutaneous Daily Mariusz Ellen, DO     • gabapentin  300 mg Oral BID Mariuszkevon Hughes, DO     • metoprolol succinate  12 5 mg Oral Daily Mariuszkevon Hughes, DO     • nicotine  1 patch Transdermal Daily Mariuszkevon Hughes, DO     • pantoprazole  40 mg Oral Early Morning Mariusz Ellen, DO     • predniSONE  40 mg Oral Daily Mariusz Ellen, DO     • QUEtiapine  50 mg Oral BID Mariuszkevon Hughes, DO     • tapentadol  50 mg Oral Q8H PRN Mariusz Hughes, DO     • traMADol  50 mg Oral Q6H PRN Mariusz Hughes DO     • venlafaxine  187 5 mg Oral Daily Mariusz Hughes DO         Continuous Infusions:         Physical exam:  General appearance:  Alert no distress interaction appropriate  Head/Eyes:  None PERRL EOMI  Neck:  Supple  Lungs: CTA bilateral no wheezing rhonchi or rales  Heart: normal S1 S2 regular  Abdomen: Soft nontender with bowel sounds  Extremities: no edema right foot decreased erythema and warmth and tenderness  Skin: no rash    Invasive Devices     Peripheral Intravenous Line  Duration           Peripheral IV 11/25/22 Left Antecubital 1 day    Peripheral IV 11/25/22 Right Antecubital <1 day                  VTE Pharmacologic Prophylaxis:  Lovenox        Counseling / Coordination of Care  Total floor / unit time spent today 30  minutes  Greater than 50% of total time was spent with the patient and / or family counseling and / or coordination of care  A description of the counseling / coordination of care: Anni Moss

## 2022-11-26 NOTE — UTILIZATION REVIEW
NOTIFICATION OF INPATIENT ADMISSION   AUTHORIZATION REQUEST   SERVICING FACILITY:   [place] [address] [address] [facility]  Tax ID: [id number]  NPI: [id number] ATTENDING PROVIDER:  Attending Name and NPI#: Mitesh Edwardston [[id number]]  Address: [address] [place] [address] [address] [facility]  Phone: [phone]     ADMISSION INFORMATION:  Place of Service: Inpatient [address] [facility]  [address]  Place of Service Code: 21  Inpatient Admission Date/Time: 11/25/22 11:54 AM  Discharge Date/Time: No discharge date for patient encounter  Admitting Diagnosis Code/Description:  Cellulitis [L03 90]  Right foot pain [M79 671]     UTILIZATION REVIEW CONTACT:  [place], Utilization   Network Utilization Review Department  Phone: [phone]  Fax: [phone]  Email: [email or web address]  Contact for approvals/pending authorizations, clinical reviews, and discharge  PHYSICIAN ADVISORY SERVICES:  Medical Necessity Denial & [id number] Review  Phone: [phone]  Fax: [phone]  Email: [email or web address]

## 2022-11-26 NOTE — PLAN OF CARE
Problem: Potential for Falls  Goal: Patient will remain free of falls  Description: INTERVENTIONS:  - Educate patient/family on patient safety including physical limitations  - Instruct patient to call for assistance with activity   - Consult OT/PT to assist with strengthening/mobility   - Keep Call bell within reach  - Keep bed low and locked with side rails adjusted as appropriate  - Keep care items and personal belongings within reach  - Initiate and maintain comfort rounds  - Make Fall Risk Sign visible to staff  - Offer Toileting every 2 Hours, in advance of need  - Initiate/Maintain bed alarm  - Apply yellow socks and bracelet for high fall risk patients  - Consider moving patient to room near nurses station  Outcome: Progressing     Problem: MOBILITY - ADULT  Goal: Maintain or return to baseline ADL function  Description: INTERVENTIONS:  -  Assess patient's ability to carry out ADLs; assess patient's baseline for ADL function and identify physical deficits which impact ability to perform ADLs (bathing, care of mouth/teeth, toileting, grooming, dressing, etc )  - Assess/evaluate cause of self-care deficits   - Assess range of motion  - Assess patient's mobility; develop plan if impaired  - Assess patient's need for assistive devices and provide as appropriate  - Encourage maximum independence but intervene and supervise when necessary  - Involve family in performance of ADLs  - Assess for home care needs following discharge   - Consider OT consult to assist with ADL evaluation and planning for discharge  - Provide patient education as appropriate  Outcome: Progressing  Goal: Maintains/Returns to pre admission functional level  Description: INTERVENTIONS:  - Perform BMAT or MOVE assessment daily    - Set and communicate daily mobility goal to care team and patient/family/caregiver  - Collaborate with rehabilitation services on mobility goals if consulted  - Perform Range of Motion 4 times a day    - Reposition patient every 2 hours    - Dangle patient 4 times a day  - Stand patient 4 times a day  - Ambulate patient 4 times a day  - Out of bed to chair 4 times a day   - Out of bed for meals 3 times a day  - Out of bed for toileting  - Record patient progress and toleration of activity level   Outcome: Progressing     Problem: PAIN - ADULT  Goal: Verbalizes/displays adequate comfort level or baseline comfort level  Description: Interventions:  - Encourage patient to monitor pain and request assistance  - Assess pain using appropriate pain scale  - Administer analgesics based on type and severity of pain and evaluate response  - Implement non-pharmacological measures as appropriate and evaluate response  - Consider cultural and social influences on pain and pain management  - Notify physician/advanced practitioner if interventions unsuccessful or patient reports new pain  Outcome: Progressing

## 2022-11-26 NOTE — UTILIZATION REVIEW
Initial Clinical Review    Admission: Date/Time/Statement:   Admission Orders (From admission, onward)     Ordered        11/25/22 1154  INPATIENT ADMISSION  Once                      Orders Placed This Encounter   Procedures   • INPATIENT ADMISSION     Standing Status:   Standing     Number of Occurrences:   1     Order Specific Question:   Level of Care     Answer:   Med Surg [16]     Order Specific Question:   Estimated length of stay     Answer:   More than 2 Midnights     Order Specific Question:   Certification     Answer:   I certify that inpatient services are medically necessary for this patient for a duration of greater than two midnights  See H&P and MD Progress Notes for additional information about the patient's course of treatment  ED Arrival Information     Expected   -    Arrival   11/25/2022 07:35    Acuity   Emergent            Means of arrival   Wheelchair    Escorted by   Self    Service   Hospitalist    Admission type   Emergency            Arrival complaint   pain and swelling of foot and leg           Chief Complaint   Patient presents with   • Foot Swelling     Pt c/o right foot swelling since Sunday and painful cant walk on it, did have a fall in September was in the hospital for about 9 weeks for that between medical and rehabilitation  Denies new injury  Denies fevers         Initial Presentation: 45 y o  female who presented self from home to [address] ED  Inpatient admission for evaluation and treatment of R ankle pain  PMHx: bacteremia, chronic pain, depression, GERD  Presented w/ swollen red painful R foot for 5 days  Pain limiting ambulation  On exam, diffuse swelling and erythema, tenderness and warmth to R foot  Imaging concerning for gout  Plan: initiate steroids, analgesics, check uric acid, PT eval, continue PTA meds  Podiatry consulted  Podiatry Consult: Pt w/ low suspicion for septic ankle  Can follow outpatient  Elevate RLE when non-ambulatory         Date: 11/26/22   Day 2: Reports significant improvement w/ steroids  R foot decreased erythema, warmth  Ambulating  Start colchicine and prednisone taper  ED Triage Vitals   Temperature Pulse Respirations Blood Pressure SpO2   11/25/22 0746 11/25/22 0746 11/25/22 0746 11/25/22 0746 11/25/22 0746   97 5 °F (36 4 °C) (!) 113 18 95/57 99 %      Temp Source Heart Rate Source Patient Position - Orthostatic VS BP Location FiO2 (%)   11/25/22 1420 11/25/22 0818 11/25/22 0746 11/25/22 0746 --   Temporal Monitor Sitting Right arm       Pain Score       11/25/22 0746       8          Wt Readings from Last 1 Encounters:   11/25/22 56 2 kg (124 lb)     Additional Vital Signs:   Date/Time Temp Pulse Resp BP MAP (mmHg) SpO2 O2 Device   11/26/22 0749 99 7 °F (37 6 °C) 113 Abnormal  20 120/77 -- 98 % None (Room air)   11/25/22 2207 97 6 °F (36 4 °C) 58 20 107/54 -- 98 % None (Room air)   11/25/22 1420 98 °F (36 7 °C) 90 18 128/80 -- 97 % None (Room air)   11/25/22 1315 -- 92 16 112/64 82 93 % --   11/25/22 1247 -- 92 18 101/57 -- 97 % None (Room air)   11/25/22 1145 -- 90 18 123/71 90 98 % None (Room air)   11/25/22 1123 -- 89 18 106/52 75 98 % None (Room air)   11/25/22 1045 -- 96 19 106/67 82 98 % None (Room air)   11/25/22 0936 -- 97 18 102/69 81 95 % None (Room air)   11/25/22 0930 -- 94 19 102/69 81 97 % None (Room air)   11/25/22 0818 -- 103 18 106/64 78 100 % None (Room air)     Pertinent Labs/Diagnostic Test Results:   VAS lower limb venous duplex study, unilateral/limited   Final Result by Cheng Martinez DO (11/25 1228)      CT lower extremity w contrast right   Final Result by Nathaniel Gautam MD (11/25 1143)      Diffuse subcutaneous edema in the right lower leg and foot  No localized fluid collection or soft tissue gas  No CT evidence of osteomyelitis  Workstation performed: [id number]         XR foot 3+ views RIGHT   Final Result by Emmie Barrios DO (11/25 1011)      No acute osseous abnormality  Dorsal soft tissue swelling  Workstation performed: [id number]               Results from last 7 days   Lab Units 11/25/22  1554 11/25/22  0822   WBC Thousand/uL  --  5 91   HEMOGLOBIN g/dL  --  11 2*   HEMATOCRIT %  --  35 3   PLATELETS Thousands/uL 259 303   NEUTROS ABS Thousands/µL  --  3 99         Results from last 7 days   Lab Units 11/25/22  0857   SODIUM mmol/L 140   POTASSIUM mmol/L 4 5   CHLORIDE mmol/L 102   CO2 mmol/L 32   ANION GAP mmol/L 6   BUN mg/dL 13   CREATININE mg/dL 0 91   EGFR ml/min/1 73sq m 71   CALCIUM mg/dL 9 2     Results from last 7 days   Lab Units 11/25/22  0857   AST U/L 20   ALT U/L 20   ALK PHOS U/L 95   TOTAL PROTEIN g/dL 6 6   ALBUMIN g/dL 3 2*   TOTAL BILIRUBIN mg/dL 0 17*         Results from last 7 days   Lab Units 11/25/22  0857   GLUCOSE RANDOM mg/dL 108     Results from last 7 days   Lab Units 11/25/22  0822   PROTIME seconds 11 2*   INR  0 81*   PTT seconds 26         Results from last 7 days   Lab Units 11/25/22  0822   PROCALCITONIN ng/ml 0 06     Results from last 7 days   Lab Units 11/25/22  0841   LACTIC ACID mmol/L 1 6     Results from last 7 days   Lab Units 11/25/22  0857 11/25/22  0822   CRP mg/L <3 0  --    SED RATE mm/hour  --  32*             Results from last 7 days   Lab Units 11/25/22  1206   CLARITY UA  Clear   COLOR UA  Yellow   SPEC GRAV UA  <=1 005   PH UA  7 0   GLUCOSE UA mg/dl Negative   KETONES UA mg/dl Negative   BLOOD UA  Negative   PROTEIN UA mg/dl Negative   NITRITE UA  Negative   BILIRUBIN UA  Negative   UROBILINOGEN UA E U /dl 0 2   LEUKOCYTES UA  Small*   WBC UA /hpf 2-4   RBC UA /hpf None Seen   BACTERIA UA /hpf Occasional   EPITHELIAL CELLS WET PREP /hpf Occasional     Results from last 7 days   Lab Units 11/25/22  0841 11/25/22  7703   BLOOD CULTURE  No Growth at 24 hrs  Received in Microbiology Lab  Culture in Progress                 ED Treatment:   Medication Administration from 11/25/2022 0734 to 11/25/2022 1411       Date/Time Order Dose Route Action     11/25/2022 0821 EST sodium chloride 0 9 % bolus 1,000 mL 1,000 mL Intravenous New Bag     11/25/2022 0829 EST acetaminophen (TYLENOL) tablet 975 mg 975 mg Oral Given     11/25/2022 0831 EST ketorolac (TORADOL) injection 15 mg 15 mg Intravenous Given     11/25/2022 0843 EST ceftriaxone (ROCEPHIN) 1 g/50 mL in dextrose IVPB 1,000 mg Intravenous New Bag     11/25/2022 0933 EST morphine injection 5 mg 5 mg Intravenous Given     11/25/2022 1108 EST iohexol (OMNIPAQUE) 350 MG/ML injection (SINGLE-DOSE) 100 mL 100 mL Intravenous Given     11/25/2022 1143 EST morphine injection 6 mg 6 mg Intravenous Given        Past Medical History:   Diagnosis Date   • Aspiration pneumonitis ([facility] ) 10/1/2022   • Bacteremia due to methicillin susceptible Staphylococcus aureus (MSSA) 9/27/2022   • Chronic pain disorder    • Depression    • GERD (gastroesophageal reflux disease)    • History of electroconvulsive therapy    • Low back pain    • Self-injurious behavior    • Suicide attempt [facility])      Present on Admission:  • Acute right ankle pain  • Hypertension  • Hyperlipidemia  • COPD (chronic obstructive pulmonary disease) ([facility] )  • Tobacco abuse  • Ambulatory dysfunction      Admitting Diagnosis: Cellulitis [L03 90]  Right foot pain [M79 671]  Age/Sex: 45 y o  female  Admission Orders:  Regular Diet      Scheduled Medications:  colchicine, 0 6 mg, Oral, BID  gabapentin, 300 mg, Oral, BID  metoprolol succinate, 12 5 mg, Oral, Daily  nicotine, 1 patch, Transdermal, Daily  pantoprazole, 40 mg, Oral, Early Morning  predniSONE, 40 mg, Oral, Daily  QUEtiapine, 50 mg, Oral, BID  venlafaxine, 187 5 mg, Oral, Daily    Continuous IV Infusions:  none    PRN Meds:  acetaminophen, 650 mg, Oral, Q6H PRN  ALPRAZolam, 0 5 mg, Oral; 11/25 x1  ketorolac, 15 mg, Intravenous; 11/25 x1  methylprednisolone sodium succinate, 60 mg, Intravenous; 11/25 x1  tapentadol, 50 mg, Oral, Q8H PRN; 11/25 x1, 11/26 x2  traMADol, 50 mg, Oral, Q6H PRN        IP CONSULT TO PODIATRY    Network Utilization Review Department  ATTENTION: Please call with any questions or concerns to [phone] and carefully listen to the prompts so that you are directed to the right person  All voicemails are confidential   Stephen Leroy all requests for admission clinical reviews, approved or denied determinations and any other requests to dedicated fax number below belonging to the campus where the patient is receiving treatment   List of dedicated fax numbers for the Facilities:  [address] DENIALS (Administrative/Medical Necessity) [phone]   [address] (Maternity/NICU/Pediatrics) [phone]   [address] Minda [address] [phone]   [place] [address] [phone]   [facility] [place] [address] Wanda Miller 28 [phone]   [facility] [address] 8[address] [facility] [phone]

## 2022-11-26 NOTE — DISCHARGE SUMMARY
Discharge Summary - Medical Alejandro Benavides 45 y o  female MRN: [id number]    [facility] [address] SURG Room / Bed: [place] [address] /E2 -* Encounter: [id number]    BRIEF OVERVIEW    Admitting Provider: Brian Durant DO  Discharge Provider: Brian Durant DO  Admission Date: 11/25/2022     Discharge Date: No discharge date for patient encounter  Primary Care Physician at Discharge:  Meron Marroquin [phone]    Primary Discharge Diagnosis    Acute gout      Other Problems Addressed  Patient Active Problem List    Diagnosis Date Noted   • Acute right ankle pain 11/25/2022   • Right foot pain 11/05/2022   • Ambulatory dysfunction 11/01/2022   • Mild protein-calorie malnutrition ([address] ) 10/09/2022   • Diarrhea 10/03/2022   • Hypokalemia 10/02/2022   • Hypernatremia 10/02/2022   • Pulmonary edema 09/30/2022   • Localized swelling of right upper extremity 09/26/2022   • Leg edema, right 09/23/2022   • D-dimer, elevated 09/22/2022   • ARF (acute renal failure) ([address] ) 09/19/2022   • Transaminitis 09/19/2022   • Toxic metabolic encephalopathy 00/66/4954   • Abnormal CT of the chest 09/19/2022   • Traumatic rhabdomyolysis ([address] ) 09/19/2022   • Hyperglycemia 07/14/2022   • Opioid dependence ([address] ) 12/09/2021   • Chronic right shoulder pain 03/03/2021   • Internal hemorrhoids 05/18/2020   • Adnexal cyst 05/18/2020   • Ischemic colitis ([address] ) 05/13/2020   • Intercostal neuralgia    • Hematochezia 05/29/2019   • Insomnia 05/20/2019   • History of rib fracture 10/28/2018   • Tobacco abuse 10/28/2018   • Right knee pain 05/25/2018   • Generalized anxiety disorder 05/24/2018   • Hypertension    • Hyperlipidemia    • Depression    • COPD (chronic obstructive pulmonary disease) ([address] )    • Chondromalacia patellae 04/18/2018   • Irritable bowel syndrome with diarrhea 01/25/2018   • DDD (degenerative disc disease), lumbosacral 04/07/2014       Discharge Disposition: home    Allergies  Allergies Allergen Reactions   • Chantix [Varenicline]    • Ibuprofen Other (See Comments)     Upset stomach   • Lyrica [Pregabalin] Other (See Comments)     bruising   • Penicillins Other (See Comments)     ? hives   • Sulfa Antibiotics Other (See Comments)     sloughing skin in mouth   • Sulfasalazine      Diet restrictions: none  Activity restrictions: none  Discharge Condition: good      Outpatient Follow-Up  Dr Gail Ndiaye in one week    [address] [place] [address]    Presenting Problem/History of Present Illness  Acute right ankle pain  Hospital Course    45years old female presented with right ankle pain  Acute Gout/pseudogout  Reviewed CT diffuse SQ edema no signs of infection, initiated on  Steroids with benefit  Pain nearly resolved and able to ambulate prior to discharge  Discharged with prednisone taper and colchicine  Other medical conditions stable    Discharge  Statement   I spent 35minutes discharging the patient  This time was spent on the day of discharge  I had direct contact with the patient on the day of discharge  Additional documentation is required if more than 30 minutes were spent on discharge  Discuss discharge and follow up with patient and     Discharge instructions/Information to patient and family:   See after visit summary for information provided to patient and family

## 2022-11-26 NOTE — PLAN OF CARE
Problem: Potential for Falls  Goal: Patient will remain free of falls  Description: INTERVENTIONS:  - Educate patient/family on patient safety including physical limitations  - Instruct patient to call for assistance with activity   - Consult OT/PT to assist with strengthening/mobility   - Keep Call bell within reach  - Keep bed low and locked with side rails adjusted as appropriate  - Keep care items and personal belongings within reach  - Initiate and maintain comfort rounds  - Make Fall Risk Sign visible to staff  - Offer Toileting every 2 Hours, in advance of need  - Initiate/Maintain bed alarm  - Apply yellow socks and bracelet for high fall risk patients  - Consider moving patient to room near nurses station  Outcome: Progressing     Problem: PAIN - ADULT  Goal: Verbalizes/displays adequate comfort level or baseline comfort level  Description: Interventions:  - Encourage patient to monitor pain and request assistance  - Assess pain using appropriate pain scale  - Administer analgesics based on type and severity of pain and evaluate response  - Implement non-pharmacological measures as appropriate and evaluate response  - Consider cultural and social influences on pain and pain management  - Notify physician/advanced practitioner if interventions unsuccessful or patient reports new pain  Outcome: Progressing

## 2022-11-28 ENCOUNTER — TRANSITIONAL CARE MANAGEMENT (OUTPATIENT)
Dept: FAMILY MEDICINE CLINIC | Facility: CLINIC | Age: 52
End: 2022-11-28

## 2022-11-28 ENCOUNTER — PATIENT OUTREACH (OUTPATIENT)
Dept: FAMILY MEDICINE CLINIC | Facility: CLINIC | Age: 52
End: 2022-11-28

## 2022-11-28 NOTE — PROGRESS NOTES
OP CM called to pt and LM in regards to referral for OP psych services  Pt had long and multiple hospital admissions including the intensive care unit

## 2022-11-29 ENCOUNTER — PATIENT OUTREACH (OUTPATIENT)
Dept: FAMILY MEDICINE CLINIC | Facility: CLINIC | Age: 52
End: 2022-11-29

## 2022-11-29 DIAGNOSIS — Z59.41 FOOD INSECURITY: Primary | ICD-10-CM

## 2022-11-29 SDOH — ECONOMIC STABILITY - FOOD INSECURITY: FOOD INSECURITY: Z59.41

## 2022-11-29 NOTE — PROGRESS NOTES
OP CM called to pt in regards to referral for pt after hospital stay  Pt states she is already established with [address] [place] [address] and sees Dr Monica Larkin  Pt states she has his phone number and will call to schedule  Pt states she was supposed to start OP PT but now has gout  Pt states she has difficulty walking and cannot leave the house and cannot drive  Pt also states that she has about 35 hospital bills and feels very overwhelmed  Pt is listed as having medicaid secondary to her [employer]  Provided pt the phone number for financial counselors to discuss bills and the hardship program       Pt states she has an appt tomorrow with pain mgmt  Pt states that her hsb works mostly from home  Pt went to [facility] after her hospitalization  Pt now uses a roller walker but states that is even hard now with having gout  Pt states she has not applied for SSD but she would like to  Explained she can go to [place] and apply online  Pt advised not to wait since they are backed up  Pt does have medicaid but does not have food stamps  Offered to have [facility] reach out to her to see if she can assist       OP CM will continue to follow

## 2022-11-30 ENCOUNTER — TELEPHONE (OUTPATIENT)
Dept: PSYCHIATRY | Facility: CLINIC | Age: 52
End: 2022-11-30

## 2022-11-30 ENCOUNTER — PATIENT OUTREACH (OUTPATIENT)
Dept: FAMILY MEDICINE CLINIC | Facility: CLINIC | Age: 52
End: 2022-11-30

## 2022-11-30 LAB
BACTERIA BLD CULT: NORMAL
BACTERIA BLD CULT: NORMAL

## 2022-11-30 NOTE — TELEPHONE ENCOUNTER
I had reached out to pt in regards to referral we had received  When pt picked up the phone pt stated it was her but when I asked to verify  pt would not verify unless I told her what I was calling for  I did tell pt that I was calling about a referral but pt wanted more information and would not verify       If pt does call back to intake I was calling in regards to STAT referral sent and dong TAVON to another psychiatrist

## 2022-11-30 NOTE — PROGRESS NOTES
[facility]- Chart review for referral from OP CM TREVON Roberts to assist patient with SNAP application  Chart Reviewed and [facility] will contact patient this day to schedule a time to complete application  [facility] - contacted patient - no answer not able to leave message  CMOC will try again next day

## 2022-12-02 ENCOUNTER — PATIENT OUTREACH (OUTPATIENT)
Dept: FAMILY MEDICINE CLINIC | Facility: CLINIC | Age: 52
End: 2022-12-02

## 2022-12-02 NOTE — PROGRESS NOTES
CMOC-following up with patient on interest in [employer] application assistance  Phone rang and rang no option to leave message -  [facility] will try again to contact in 3 days time

## 2022-12-03 NOTE — TELEPHONE ENCOUNTER
PDMP 9/30 PODIATRY CONSULTATION NOTE      Date of Consultation:  12/3/2022  Admitting Diagnosis: Bacteremia [R78.81]   Reason for Consultation: Right foot ulcer  Advance Directives: Full Resuscitation   Attending: Maximilian Davies MD    Subjective   HPI: 70 y/o male with past medical history of ESRD on HD, HTN, DM, s/p graft application to right foot presents to [facility] for gram negative bacteremia. Podiatry consulted for evaluation of right foot ulcers s/p graft. Patient was recently discharged from the hospital. Denies pain to his feet. Denies n/v/f/c/cp/sob. No other pedal complaints    ROS as per HPI    Patient Active Problem List   Diagnosis   • Abnormal ankle brachial index (JAMI)   • Diabetes mellitus due to underlying condition with complication (CMS/HCC)   • ESRD (end stage renal disease) on dialysis (CMS/[facility])   • Essential hypertension   • Monoclonal gammopathy   • Diabetic foot ulcer (CMS/HCC)   • Benign essential hypertension   • Facial mass   • Type 2 diabetes mellitus with chronic kidney disease on chronic dialysis, without long-term current use of insulin (CMS/[facility])   • Anemia due to chronic kidney disease, on chronic dialysis (CMS/HCC)   • History of complete ray amputation of fifth toe (CMS/HCC)   • Facial abscess   • Debility   • Open wound of left foot except toes with complication   • Neuropathy   • Hyperkalemia   • Colon cancer screening   • Dyslipidemia   • Need for shingles vaccine   • Diabetic retinopathy of both eyes without macular edema associated with diabetes mellitus due to underlying condition (CMS/HCC)   • Sepsis without acute organ dysfunction, due to unspecified organism (CMS/HCC)   • Bacteremia        PMHx:    Past Medical History:   Diagnosis Date   • Cataract    • Chronic kidney disease    • Diabetes mellitus (CMS/HCC)    • Essential (primary) hypertension    • Hemodialysis access site with arteriovenous graft (CMS/HCC)        PSHx:   Past Surgical History:   Procedure Laterality Date   • Hand  surgery     • Knee cartilage surgery Right    • Toe amputation          SHx:    Social History     Socioeconomic History   • Marital status: /Civil Union     Spouse name: Not on file   • Number of children: Not on file   • Years of education: Not on file   • Highest education level: Not on file   Occupational History   • Not on file   Tobacco Use   • Smoking status: Former Smoker     Packs/day: 1.50     Years: 30.00     Pack years: 45.00   • Smokeless tobacco: Never Used   Vaping Use   • Vaping Use: never used   Substance and Sexual Activity   • Alcohol use: Yes     Comment: rarely   • Drug use: No   • Sexual activity: Not Currently     Partners: Female   Other Topics Concern   • Not on file   Social History Narrative   • Not on file     Social Determinants of Health     Financial Resource Strain: Not on file   Food Insecurity: Not on file   Transportation Needs: Not on file   Physical Activity: Not on file   Stress: Not on file   Social Connections: Not on file   Intimate Partner Violence: Not At Risk   • Social Determinants: Intimate Partner Violence Past Fear: No   • Social Determinants: Intimate Partner Violence Current Fear: No        FHx:    Family History   Problem Relation Age of Onset   • Kidney disease Mother         Allergies:   ALLERGIES:  Patient has no known allergies.    MEDS:    Current Facility-Administered Medications   Medication Dose Route Frequency Provider Last Rate Last Admin   • cefTRIAXone (ROCEPHIN) syringe 2,000 mg  2,000 mg Intravenous Q24H Helene Zayas MD   2,000 mg at 12/03/22 0826   • acetaminophen-codeine (TYLENOL NO.3) 300-30 MG per tablet 1 tablet  1 tablet Oral Once Oliverio Rodrigues MD       • polyethylene glycol (MIRALAX) packet 17 g  17 g Oral Daily PRN Gina Solorio,        • calcitRIOL (ROCALTROL) capsule 0.25 mcg  0.25 mcg Oral Daily Gina Solorio DO       • cinacalcet (SENSIPAR) tablet 30 mg  30 mg Oral Daily Gina Solorio DO        • furosemide (LASIX) tablet 20 mg  20 mg Oral Daily Gina Solorio, DO       • hydrALAZINE (APRESOLINE) tablet 50 mg  50 mg Oral TID Gina Solorio, DO       • lanthanum (FOSRENOL) chewable 1,000 mg  1,000 mg Oral TID  Gina Solorio, DO       • magnesium oxide (MAG-OX) tablet 400 mg  400 mg Oral Daily Gina Solorio, DO       • sodium bicarbonate tablet 650 mg  650 mg Oral TID Gina Solorio, DO       • sodium chloride 0.9 % flush bag 25 mL  25 mL Intravenous PRN Helene Zayas MD       • sodium chloride (PF) 0.9 % injection 2 mL  2 mL Intracatheter 2 times per day Helene Zayas MD   2 mL at 12/03/22 0825   • heparin (porcine) injection 5,000 Units  5,000 Units Subcutaneous 3 times per day Helene Zayas MD   5,000 Units at 12/03/22 0534   • acetaminophen (TYLENOL) tablet 650 mg  650 mg Oral Q4H PRN Helene Zayas MD       • melatonin tablet 6 mg  6 mg Oral Nightly PRN Helene Zayas MD       • dextrose 50 % injection 25 g  25 g Intravenous PRN Helene Zayas MD       • dextrose 50 % injection 12.5 g  12.5 g Intravenous PRN Helene Zayas MD       • glucagon (GLUCAGEN) injection 1 mg  1 mg Intramuscular PRN Helene Zayas MD       • dextrose (GLUTOSE) 40 % gel 15 g  15 g Oral PRN Helene Zayas MD       • dextrose (GLUTOSE) 40 % gel 30 g  30 g Oral PRN Helene Zayas MD       • insulin lispro (ADMELOG,HumaLOG) - Correction Dose   Subcutaneous Nightly Helene Zayas MD       • insulin lispro (ADMELOG,HumaLOG) - Correction Dose   Subcutaneous TID  Helene Zayas MD       • amLODIPine (NORVASC) tablet 10 mg  10 mg Oral Daily Helene Zayas MD   10 mg at 12/03/22 0824   • aspirin chewable 81 mg  81 mg Oral Daily Helene Zayas MD   81 mg at 12/03/22 0824   • gabapentin (NEURONTIN) capsule 200 mg  200 mg Oral QHS Helene Zayas MD       • pantoprazole (PROTONIX) EC tablet 40 mg  [facility]   40 mg at 12/03/22 0534   •  HYDROcodone-acetaminophen (NORCO) 5-325 MG per tablet 1 tablet  1 tablet Oral Q4H PRN Helene Zayas MD   1 tablet at 12/03/22 0554     Medications Prior to Admission   Medication Sig Dispense Refill   • pantoprazole (PROTONIX) 40 MG tablet TAKE 1 TABLET BY MOUTH BEFORE BREAKFAST     • amLODIPine (NORVASC) 10 MG tablet Take 1 tablet by mouth daily. 30 tablet 3   • aspirin 81 MG EC tablet Take 1 tablet by mouth daily. 30 tablet 3   • furosemide (LASIX) 20 MG tablet Take 1 tablet by mouth daily. 30 tablet 3   • gabapentin (NEURONTIN) 100 MG capsule Take 2 capsules by mouth at bedtime. 30 capsule 3   • hydrALAZINE (APRESOLINE) 50 MG tablet Take 1 tablet by mouth 3 times daily. 90 tablet 3   • Ascorbic Acid (vitamin C) 100 MG tablet Take 1 tablet by mouth daily. 30 tablet 3   • Cholecalciferol (vitamin D) 50 mcg (2,000 units) capsule Take 1 capsule by mouth daily. 30 capsule 3   • cinacalcet (SENSIPAR) 30 MG tablet Take 1 tablet by mouth 3 days a week. (Patient taking differently: Take 30 mg by mouth daily.) 60 tablet 3   • B Complex-C-Folic Acid (Renal) 1 MG Cap Take 1 tablet (1 mg) by mouth daily. 90 capsule 3   • SITagliptan (JANUVIA) 25 MG tablet Take 1 tablet by mouth daily. 90 tablet 1   • Magnesium 100 MG Cap Take 100 mg by mouth daily. 90 capsule 3   • lanthanum (FOSRENOL) 1000 MG chewable tablet CHEW AND SWALLOW ONE TABLET BY MOUTH THREE TIMES DAILY WITH FOOD     • lisinopril (ZESTRIL) 40 MG tablet Take 1 tablet by mouth daily. 30 tablet 3   • polyethylene glycol (MIRALAX) 17 g packet Take 17 g by mouth daily as needed. Do not start before May 28, 2021. Stir and dissolve powder in any 4 to 8 ounces of beverage, then drink.     • HYDROcodone-acetaminophen (NORCO) 5-325 MG per tablet Take 1 tablet by mouth every 4 hours as needed for Pain. 20 tablet 0   • Accu-Chek FastClix Lancets Misc Use 1x a day to check blood sugars 100 each 3   • blood glucose (ACCU-CHEK GUIDE) test strip Test blood sugar 1 times daily as  directed. Diagnosis: E11.65 Meter: Accu-swetha Guide 100 strip 5   • Blood Glucose Monitoring Suppl (ACCU-CHEK GUIDE) w/Device Kit 1 each daily. 1 kit 0   • acetaminophen-codeine (TYLENOL NO.3) 300-30 MG per tablet Take 1 tablet by mouth every 4 hours as needed.   0   • calcitRIOL (ROCALTROL) 0.25 MCG capsule Take 0.25 mcg by mouth daily.      • sodium bicarbonate 650 MG tablet Take 650 mg by mouth 3 times daily.          Objective     Temp:  [98 °F (36.7 °C)-98.6 °F (37 °C)] 98.4 °F (36.9 °C)  Heart Rate:  [70-85] 71  Resp:  [16-17] 17  BP: (128-137)/(63-64) 137/63  SpO2 Readings from Last 1 Encounters:   12/03/22 99%     No intake or output data in the 24 hours ending 12/03/22 1215   No results found for: PHARTERIAL         Podiatric Physical Exam    General:  NAD  A&Ox3    Derm:  - Skin is warm, dry, atrophic bilaterally  -Wound right foot: There is a full thickness ulceration noted to plantar aspect of second metatarsal head with overlying HPK. Wound does not probe to bone.  There is no boggieness, fluctuance, or creptius upon palpation.  No malodor noted.  No acute SOI.  -Wound right foot: There is a full-thickness ulceration noted plantar aspect of the fourth metatarsal head with overlying HPK. Wound does not probe deep. There is not tracking or undermining present.  Periwound is hyperkeratotic.  There is not active drainage present.  There is not purulent drainage present from the wound.  There is not ascending erythema present from the wound.  There is not bogginess, fluctuance or crepitance noted upon palpation.  No malodor noted.  No acute SOI.  - Nails 1-5 are dystrophic and discolored  - No additional HPKs, interdigital macerations, open lesions, tissue loss, or acute SOI noted to the LLE and RLE structures.  - The remainder of the integument is unremarkable bilaterally      Vasc:  -DP/PT pulses non palpable to michael feet  -CFT is 3 seconds to digits 1-4, right foot and digits 1, 4-5, left foot.  -No edema  to michael distal lower extremity  -Hair growth absent distal to the knee joint on bilateral lower extremity.     Neuro:  -Gross sensation via light touch intact to the feet bilaterally     MSK:  -Muscle strength 5/5 for all muscles crossing ankle joint michael  -Motor function intact to digits michael  -No tenderness to palpation to foot bilaterally.  -(-) pain to calf squeeze bilaterally.  -Lateral deviation of the hallux with medial deviation of the first metatarsal, bilateral lower extremity.  -Hammer digits 2 digits 4-5, left foot and digits 2-4, right foot.  -S/p partial fifth ray amputation, right foot.  -S/p partial 2-3 ray amputation, left foot.  -S/p 4-5 metatarsal head resections, left foot.      Labs   Recent Labs   Lab 12/03/22  0442 12/03/22  0441 11/30/22  1021 11/30/22  0920 11/30/22  0600   WBC  --  5.6  --  6.6  --    HCT  --  28.8*  --  37.2*  --    HGB  --  9.6*  --  11.6*  --    PLT  --  160  --  209  --    INR  --   --   --   --  1.0   PTT  --   --  25  --   --    SODIUM 135  --   --  135  --    POTASSIUM 3.9  --   --  4.0  --    CHLORIDE 97  --   --  96*  --    CO2 29  --   --  24  --    GLUCOSE 144*  --   --  193*  --    BUN 48*  --   --  48*  --    CREATININE 9.43*  --   --  10.20*  --         Imaging    No results found.     Assessment and Plan   68 y/o male with past medical history of ESRD on HD, HTN, DM, s/p graft application to right foot presents to [facility] for gram negative bacteremia. Podiatry consulted for evaluation of right foot ulcers s/p graft    #Full-thickness ulcerations w/overlying HPK, right foot  #S/p partial fifth ray amputation, right foot, stable  #S/p partial 2-3 ray amputation, left foot, stable  #S/p 4-5 metatarsal head resections, left foot, stable  -VSS  -WBC 5.6  -Clinical findings as above. No acute SOI.  Full-thickness ulcerations w/overlying HPK, stable.  -As discussed with this patient, due to their multiple co-morbidities and current health status, this patient is at high  risk of future hospital readmissions, serial debridements, toe loss, foot loss, more proximal amputation, BKA/AKA, limb loss, and life loss. Patient verbalized understanding.  -ID on consult -- proteus bacteremia, repeat bcx, ceftriaxone     Recommendations:  Wound Care: Dry 4 x 4, Kerlix, tape  WB status: As tolerated  Activity: As tolerated     Plan:   -No acute surgical intervention from podiatry standpoint at this time  -Continue with wound care   -Abx per ID  -Will debride HPK tomorrow to fully evaluate right foot ulcers  -Podiatry will continue to follow peripherally while in house     Thank you for the consultation and including Podiatry in the care of this patient.     Patient was seen and evaluated by Pat Cordero, PGY-2 and Dr. Ahn    Podiatric Medicine and Surgery   Please page 1st Call Resident - Podiatry [facility] via [employer]

## 2022-12-06 ENCOUNTER — OFFICE VISIT (OUTPATIENT)
Dept: FAMILY MEDICINE CLINIC | Facility: CLINIC | Age: 52
End: 2022-12-06

## 2022-12-06 VITALS
WEIGHT: 119 LBS | RESPIRATION RATE: 18 BRPM | DIASTOLIC BLOOD PRESSURE: 72 MMHG | SYSTOLIC BLOOD PRESSURE: 128 MMHG | BODY MASS INDEX: 19.13 KG/M2 | HEIGHT: 66 IN | HEART RATE: 72 BPM | TEMPERATURE: 97.2 F | OXYGEN SATURATION: 99 %

## 2022-12-06 DIAGNOSIS — M79.671 RIGHT FOOT PAIN: ICD-10-CM

## 2022-12-06 DIAGNOSIS — J43.9 PULMONARY EMPHYSEMA, UNSPECIFIED EMPHYSEMA TYPE (HCC): ICD-10-CM

## 2022-12-06 DIAGNOSIS — I10 PRIMARY HYPERTENSION: ICD-10-CM

## 2022-12-06 DIAGNOSIS — R26.2 AMBULATORY DYSFUNCTION: Primary | ICD-10-CM

## 2022-12-06 DIAGNOSIS — Z12.4 SCREENING FOR CERVICAL CANCER: ICD-10-CM

## 2022-12-06 DIAGNOSIS — F11.20 UNCOMPLICATED OPIOID DEPENDENCE (HCC): ICD-10-CM

## 2022-12-06 RX ORDER — TAPENTADOL HYDROCHLORIDE 50 MG/1
TABLET, FILM COATED ORAL
COMMUNITY
Start: 2022-11-30

## 2022-12-06 RX ORDER — PREDNISONE 10 MG/1
TABLET ORAL
Qty: 50 TABLET | Refills: 0 | Status: SHIPPED | OUTPATIENT
Start: 2022-12-06

## 2022-12-06 NOTE — ASSESSMENT & PLAN NOTE
Patient does continue with increased pain in the foot  Was better with steroids, but was also off foot in the hospital   Recommend check labs, restart steroids, podiatry and rheumatology  Try to stay off foot  Had CT in hospital that did not show fracture  This should tell us about stress fracture, but with the increased swelling, I will discuss with Pod about MRI

## 2022-12-06 NOTE — PATIENT INSTRUCTIONS
1  Ambulatory dysfunction  Assessment & Plan:  Has problems walking due to the pain in the right foot  Follow with Podiatry and Rheum, check labs  Orders:  -     RF Screen w/ Reflex to Titer; Future  -     Cyclic citrul peptide antibody, IgG; Future  -     CHACORTA Screen w/ Reflex to Titer/Pattern; Future  -     Lyme Antibody Profile with reflex to WB; Future  -     Sedimentation rate, automated; Future  -     C-reactive protein; Future  -     predniSONE 10 mg tablet; Take 3 BID for 3 days then 2 BID for 3 days then 1 BID for 3 days then 1 QD for 3 days then 1/2 QD for 3 days then stop  -     Ambulatory Referral to Rheumatology; Future  -     Ambulatory Referral to Podiatry; Future    2  Right foot pain  Assessment & Plan:  Patient does continue with increased pain in the foot  Was better with steroids, but was also off foot in the hospital   Recommend check labs, restart steroids, podiatry and rheumatology  Try to stay off foot  Had CT in hospital that did not show fracture  This should tell us about stress fracture, but with the increased swelling, I will discuss with Pod about MRI  3  Pulmonary emphysema, unspecified emphysema type ([address] )  Assessment & Plan:  Stable for now  No specific changes  4  Primary hypertension  Assessment & Plan:  Blood pressure today is excellent  No changes  5  Uncomplicated opioid dependence ([address] )  Assessment & Plan: On Nucynta from Pain manangement  Will eliminate Tramadol from the med list         6  Screening for cervical cancer  -     Ambulatory Referral to Gynecology; Future      COVID 19 Instructions    Jh Fuel was advised to limit contact with others to essential tasks such as getting food, medications, and medical care  Proper handwashing reviewed, and Hand sanitzer when washing is not available  If the patient develops symptoms of COVID 19, the patient should call the office as soon as possible      For 9159-8751 Flu season, it is strongly recommended that Flu Vaccinations be obtained  Virtual Visits:  Michelle: This works on smart phones (any phone with Internet browsing capability)  You should get a text message when the provider is ready to see you  Click on the link in the text message, and the call should start  You will need to type in your name, and allow camera and microphone access  This is HIPPA compliant, and secure  If you have not already done so, get immunized to COVID 19  We are committed to getting you vaccinated as soon as possible and will be closely following CDC and [facility]  guidelines as they are released and revised  Please refer to our COVID-19 vaccine webpage for the most up to date information on the vaccine and our distribution efforts  This site will also have the most up to date recommendations for COVID booster vaccine  Sarai ybarra    Call [phone]), option [phone]    OUR NEW LOCATION:    [address], [address] [place], [place] [address] , [place], [address]  Fax: [phone]    Lab services and OB/GYN are at this location as well

## 2022-12-06 NOTE — PROGRESS NOTES
Name: Karlee Gallagher      : 1970      MRN: [id number]  Encounter Provider: Deidra Denson MD  Encounter Date: 2022   Encounter department: [facility] PRIMARY CARE    Assessment & Plan     1  Ambulatory dysfunction  Assessment & Plan:  Has problems walking due to the pain in the right foot  Follow with Podiatry and Rheum, check labs  Orders:  -     RF Screen w/ Reflex to Titer; Future  -     Cyclic citrul peptide antibody, IgG; Future  -     CHACORTA Screen w/ Reflex to Titer/Pattern; Future  -     Lyme Antibody Profile with reflex to WB; Future  -     Sedimentation rate, automated; Future  -     C-reactive protein; Future  -     predniSONE 10 mg tablet; Take 3 BID for 3 days then 2 BID for 3 days then 1 BID for 3 days then 1 QD for 3 days then 1/2 QD for 3 days then stop  -     Ambulatory Referral to Rheumatology; Future  -     Ambulatory Referral to Podiatry; Future    2  Right foot pain  Assessment & Plan:  Patient does continue with increased pain in the foot  Was better with steroids, but was also off foot in the hospital   Recommend check labs, restart steroids, podiatry and rheumatology  Try to stay off foot  Had CT in hospital that did not show fracture  This should tell us about stress fracture, but with the increased swelling, I will discuss with Pod about MRI  3  Pulmonary emphysema, unspecified emphysema type ([address] )  Assessment & Plan:  Stable for now  No specific changes  4  Primary hypertension  Assessment & Plan:  Blood pressure today is excellent  No changes  5  Uncomplicated opioid dependence ([address] )  Assessment & Plan: On Nucynta from Pain manangement  Will eliminate Tramadol from the med list         6  Screening for cervical cancer  -     Ambulatory Referral to Gynecology; Future           Subjective      Patient here to follow up on multiple issues  She was admitted for Cellulitis, but discharged for Gout per       Patient now has continued pain  Is not better since discharge  She is off steroids now, and the pain has recurred  Review of Systems    Current Outpatient Medications on File Prior to Visit   Medication Sig   • ALPRAZolam (XANAX) 0 5 mg tablet Take 1 tablet (0 5 mg total) by mouth daily at bedtime as needed for anxiety or sleep for up to 15 days   • colchicine (COLCRYS) 0 6 mg tablet Take 1 tablet (0 6 mg total) by mouth 2 (two) times a day   • gabapentin (NEURONTIN) 300 mg capsule Take 300 mg by mouth 2 (two) times a day   • metoprolol succinate (TOPROL-XL) 25 mg 24 hr tablet Take 0 5 tablets (12 5 mg total) by mouth daily   • nicotine (NICODERM CQ) 14 mg/24hr TD 24 hr patch Place 1 patch on the skin daily Do not start before November 8, 2022     • Nucynta 50 MG tablet    • omeprazole (PriLOSEC) 40 MG capsule Take 1 capsule (40 mg total) by mouth daily   • QUEtiapine (SEROquel) 50 mg tablet Take 1 tablet (50 mg total) by mouth 2 (two) times a day   • saccharomyces boulardii (FLORASTOR) 250 mg capsule Take 1 capsule (250 mg total) by mouth 2 (two) times a day   • traMADol (ULTRAM) 50 mg tablet Take 1 tablet (50 mg total) by mouth every 6 (six) hours as needed for moderate pain for up to 10 days   • venlafaxine (EFFEXOR-XR) 37 5 mg 24 hr capsule Take 5 capsules (187 5 mg total) by mouth daily Pt takes one a day   • [DISCONTINUED] predniSONE 10 mg tablet Take 4 tablets (40mg) for 2 days then 2 tablets (20 mg) for 3 days then 1 tablet for 3 days       Objective     /72 (BP [place] Right arm, Patient Position: Sitting, Cuff Size: Standard)   Pulse 72   Temp (!) 97 2 °F (36 2 °C) (Tympanic)   Resp 18   Ht 5' 6" (1 676 m)   Wt 54 kg (119 lb)   LMP 03/14/2019 (Approximate)   SpO2 99%   BMI 19 21 kg/m²     Physical Exam  Christiano Huffman MD     TCM Call     Date and time call was made  11/28/2022  9:53 AM    Hospital care reviewed  Records reviewed    Patient was hospitial[facility] at  [facility] Date of Admission  11/25/22    Date of discharge  11/26/22    Diagnosis  Right foot pain, Cellulitis    Disposition  Home    Were the patients medications reviewed and updated  No    Current Symptoms  None      TCM Call     Post hospital issues  None    Should patient be enrolled in anticoag monitoring? No    Scheduled for follow up?   Yes    Did you obtain your prescribed medications  Yes    Do you need help managing your prescriptions or medications  No    Is transportation to your appointment needed  No    I have advised the patient to call PCP with any new or worsening symptoms  Chika Beckman, Practice Administrator    Living Arrangements  Spouse or Significiant other    Support System  None    Are you recieving any outpatient services  No    Are you recieving home care services  No    Are you using any community resources  No    Current waiver services  No    Have you fallen in the last 12 months  No    Interperter language line needed  No    Comments  scheduled with dr Lashonda Youngblood on 5/18

## 2022-12-07 ENCOUNTER — PATIENT OUTREACH (OUTPATIENT)
Dept: FAMILY MEDICINE CLINIC | Facility: CLINIC | Age: 52
End: 2022-12-07

## 2022-12-07 NOTE — PROGRESS NOTES
[facility]- following up with patient on the referral for SNAP  Eft detailed message offering assistance and requesting a return call to assist     [facility] will try again in few days time to reach patient  If no return call or contact in 1 week [facility] will close referral as unable to reach

## 2022-12-08 ENCOUNTER — TELEPHONE (OUTPATIENT)
Dept: FAMILY MEDICINE CLINIC | Facility: CLINIC | Age: 52
End: 2022-12-08

## 2022-12-08 DIAGNOSIS — M79.671 RIGHT FOOT PAIN: Primary | ICD-10-CM

## 2022-12-08 DIAGNOSIS — F33.2 SEVERE EPISODE OF RECURRENT MAJOR DEPRESSIVE DISORDER, WITHOUT PSYCHOTIC FEATURES (HCC): ICD-10-CM

## 2022-12-08 RX ORDER — TRAMADOL HYDROCHLORIDE 50 MG/1
50 TABLET ORAL EVERY 8 HOURS PRN
Qty: 30 TABLET | Refills: 0 | Status: CANCELLED | OUTPATIENT
Start: 2022-12-08

## 2022-12-08 RX ORDER — MELOXICAM 15 MG/1
15 TABLET ORAL DAILY
Qty: 30 TABLET | Refills: 0 | Status: SHIPPED | OUTPATIENT
Start: 2022-12-08

## 2022-12-08 NOTE — TELEPHONE ENCOUNTER
Patient called into the office stating that she has been experiencing right foot pain and would like for dr, Cory Landau to restart her on the medication tramadol 50 mg   Please advise,thank you

## 2022-12-08 NOTE — TELEPHONE ENCOUNTER
Patient called for refill on her medication. She only has enough for two days.     She request for Nortriptyline but it is not on her medication list she was inpatient and was given this.

## 2022-12-08 NOTE — TELEPHONE ENCOUNTER
On review of her chart, she is on Nucynta from Pain management  Recommend continue same  Will add Meloxicam, 15mg PO daily  For narcotics, would need to have discussion with pain management

## 2022-12-09 RX ORDER — QUETIAPINE FUMARATE 50 MG/1
50 TABLET, FILM COATED ORAL 2 TIMES DAILY
Qty: 60 TABLET | Refills: 0 | Status: SHIPPED | OUTPATIENT
Start: 2022-12-09 | End: 2023-02-06 | Stop reason: ALTCHOICE

## 2022-12-09 RX ORDER — ALPRAZOLAM 0.5 MG/1
0.5 TABLET ORAL
Qty: 15 TABLET | Refills: 0 | Status: SHIPPED | OUTPATIENT
Start: 2022-12-09 | End: 2023-01-03 | Stop reason: SDUPTHER

## 2022-12-09 NOTE — TELEPHONE ENCOUNTER
Patient called in and stated that the meloxicam has not been giving her any relief and she is still in a a lot pf pain, patient would like to know if there is something else she can receive  Please advise   Thank you

## 2022-12-10 ENCOUNTER — HOSPITAL ENCOUNTER (EMERGENCY)
Facility: HOSPITAL | Age: 52
Discharge: HOME | End: 2022-12-10
Attending: EMERGENCY MEDICINE

## 2022-12-10 VITALS
OXYGEN SATURATION: 99 % | RESPIRATION RATE: 17 BRPM | DIASTOLIC BLOOD PRESSURE: 79 MMHG | SYSTOLIC BLOOD PRESSURE: 120 MMHG | HEART RATE: 104 BPM

## 2022-12-10 DIAGNOSIS — M79.671 CHRONIC FOOT PAIN, RIGHT: Primary | ICD-10-CM

## 2022-12-10 DIAGNOSIS — G89.29 CHRONIC FOOT PAIN, RIGHT: Primary | ICD-10-CM

## 2022-12-10 RX ORDER — HYDROCODONE BITARTRATE AND ACETAMINOPHEN 5; 325 MG/1; MG/1
1 TABLET ORAL ONCE
Status: COMPLETED | OUTPATIENT
Start: 2022-12-10 | End: 2022-12-10

## 2022-12-10 RX ORDER — LIDOCAINE 50 MG/G
1 PATCH TOPICAL ONCE
Status: DISCONTINUED | OUTPATIENT
Start: 2022-12-10 | End: 2022-12-10 | Stop reason: HOSPADM

## 2022-12-10 RX ORDER — METHOCARBAMOL 500 MG/1
500 TABLET, FILM COATED ORAL 2 TIMES DAILY
Qty: 20 TABLET | Refills: 0 | Status: SHIPPED | OUTPATIENT
Start: 2022-12-10 | End: 2023-01-03

## 2022-12-10 RX ORDER — LIDOCAINE 40 MG/G
CREAM TOPICAL AS NEEDED
Qty: 30 G | Refills: 0 | Status: SHIPPED | OUTPATIENT
Start: 2022-12-10

## 2022-12-10 RX ADMIN — HYDROCODONE BITARTRATE AND ACETAMINOPHEN 1 TABLET: 5; 325 TABLET ORAL at 10:07

## 2022-12-10 RX ADMIN — LIDOCAINE 1 PATCH: 50 PATCH TOPICAL at 10:08

## 2022-12-10 NOTE — ED PROVIDER NOTES
History  Chief Complaint   Patient presents with   • Foot Pain     Pt seen about a month ago for same  Reports was on steroids for pain but had no relief  Reports swelling to R foot     57-year-old female presents for evaluation with persistent right foot pain  She was recently seen and discharged from the hospital November 26  Patient states that since then she has been having persistent pain in her right foot  She describes multiple modalities of pain in different areas of the foot  She states it is exact pain that she has been having it has been constant is getting progressively worse  No precipitating event or trauma since she was discharged home  She had extensive inpatient work-up and is currently taking steroids from her PCP without relief  Patient is also on meloxicam       History provided by:  Patient      Prior to Admission Medications   Prescriptions Last Dose Informant Patient Reported? Taking? ALPRAZolam (XANAX) 0 5 mg tablet   No No   Sig: Take 1 tablet (0 5 mg total) by mouth daily at bedtime as needed for anxiety or sleep for up to 15 days   Nucynta 50 MG tablet   Yes No   QUEtiapine (SEROquel) 50 mg tablet   No No   Sig: Take 1 tablet (50 mg total) by mouth 2 (two) times a day   colchicine (COLCRYS) 0 6 mg tablet   No No   Sig: Take 1 tablet (0 6 mg total) by mouth 2 (two) times a day   gabapentin (NEURONTIN) 300 mg capsule   Yes No   Sig: Take 300 mg by mouth 2 (two) times a day   meloxicam (Mobic) 15 mg tablet   No No   Sig: Take 1 tablet (15 mg total) by mouth daily   metoprolol succinate (TOPROL-XL) 25 mg 24 hr tablet   No No   Sig: Take 0 5 tablets (12 5 mg total) by mouth daily   nicotine (NICODERM CQ) 14 mg/24hr TD 24 hr patch   No No   Sig: Place 1 patch on the skin daily Do not start before November 8, 2022     omeprazole (PriLOSEC) 40 MG capsule   No No   Sig: Take 1 capsule (40 mg total) by mouth daily   predniSONE 10 mg tablet   No No   Sig: Take 3 BID for 3 days then 2 BID for 3 days then 1 BID for 3 days then 1 QD for 3 days then 1/2 QD for 3 days then stop   venlafaxine (EFFEXOR-XR) 37 5 mg 24 hr capsule   No No   Sig: Take 5 capsules (187 5 mg total) by mouth daily Pt takes one a day      Facility-Administered Medications: None       Past Medical History:   Diagnosis Date   • Aspiration pneumonitis ([address] ) 10/1/2022   • Bacteremia due to methicillin susceptible Staphylococcus aureus (MSSA) 2022   • Chronic pain disorder    • Depression    • GERD (gastroesophageal reflux disease)    • History of electroconvulsive therapy    • Low back pain    • Self-injurious behavior    • Suicide attempt [facility])        Past Surgical History:   Procedure Laterality Date   •  SECTION     • COLONOSCOPY     • PANCREAS SURGERY      "pseudocysts" per patient's  Ricki Infante   • IL ESOPHAGOGASTRODUODENOSCOPY TRANSORAL DIAGNOSTIC N/A 4/10/2018    Procedure: EGD AND COLONOSCOPY;  Surgeon: Vinod Bustos MD;  [place] [facility]; Service: Gastroenterology       Family History   Problem Relation Age of Onset   • Arthritis Mother    • Coronary artery disease Mother         MI in her 63's   • Parkinsonism Father         with falls and SDH   • Parkinsonism Paternal Grandmother    • Coronary artery disease Paternal Grandfather    • Parkinsonism Paternal Aunt    • Colon cancer Family    • Depression Neg Hx      I have reviewed and agree with the history as documented      E-Cigarette/Vaping   • E-Cigarette Use Never User      E-Cigarette/Vaping Substances   • Nicotine No    • THC No    • CBD No    • Flavoring No    • Other No    • Unknown No      Social History     Tobacco Use   • Smoking status: Every Day     Packs/day: 0 50     Years: 35 00     Pack years: 17 50     Types: Cigarettes   • Smokeless tobacco: Never   Vaping Use   • Vaping Use: Never used   Substance Use Topics   • Alcohol use: Not Currently     Comment: "occasional glass of wine"   • Drug use: Yes     Types: Marijuana, Cocaine Comment: medical, MARIJUANA       Review of Systems   Constitutional: Negative for fatigue, fever and unexpected weight change  HENT: Negative for congestion, ear pain, rhinorrhea and sore throat  Eyes: Negative for pain and discharge  Respiratory: Negative for chest tightness, shortness of breath, wheezing and stridor  Chest pain     Cardiovascular: Negative for chest pain, palpitations and leg swelling  Gastrointestinal: Negative for abdominal pain, constipation, diarrhea, nausea and vomiting  Endocrine: Negative for cold intolerance, heat intolerance, polydipsia, polyphagia and polyuria  Genitourinary: Negative for dysuria, flank pain, frequency and hematuria  Musculoskeletal: Negative for arthralgias, myalgias, neck pain and neck stiffness  Skin: Negative for color change and rash  Neurological: Negative for dizziness, numbness and headaches  Hematological: Negative for adenopathy  Psychiatric/Behavioral: Negative for agitation, behavioral problems, confusion, hallucinations, self-injury, sleep disturbance and suicidal ideas  The patient is not hyperactive  Physical Exam  Physical Exam  Vitals and nursing note reviewed  Constitutional:       Appearance: She is well-developed  HENT:      Head: Normocephalic and atraumatic  Right Ear: External ear normal       Left Ear: External ear normal       Nose: Nose normal    Eyes:      Extraocular Movements: Extraocular movements intact  Conjunctiva/sclera: Conjunctivae normal    Neck:      Vascular: No JVD  Trachea: No tracheal deviation  Cardiovascular:      Rate and Rhythm: Normal rate and regular rhythm  Pulmonary:      Effort: Pulmonary effort is normal  No tachypnea, accessory muscle usage or respiratory distress  Breath sounds: Normal breath sounds  Abdominal:      General: There is no distension  Palpations: There is no mass  Tenderness: There is no abdominal tenderness        Hernia: No hernia is present  Musculoskeletal:      Right lower leg: Normal       Left lower leg: Normal       Comments: Right ankle exam within normal limits  Right foot is without swelling, redness, warmth, point tenderness  Full range of motion with pain  Neurovascular intact  Skin:     General: Skin is warm and dry  Capillary Refill: Capillary refill takes less than 2 seconds  Neurological:      General: No focal deficit present  Mental Status: She is alert and oriented to person, place, and time  Psychiatric:         Mood and Affect: Mood normal          Behavior: Behavior normal          Vital Signs  ED Triage Vitals   Temp Pulse Respirations Blood Pressure SpO2   -- 12/10/22 0938 12/10/22 0938 12/10/22 0938 12/10/22 0938    104 17 120/79 99 %      Temp src Heart Rate Source Patient Position - Orthostatic VS BP Location FiO2 (%)   -- 12/10/22 9461 12/10/22 0938 12/10/22 0938 --    Monitor Lying Right arm       Pain Score       12/10/22 1007       7           Vitals:    12/10/22 0938   BP: 120/79   Pulse: 104   Patient Position - Orthostatic VS: Lying         Visual Acuity      ED Medications  Medications   lidocaine (LIDODERM) 5 % patch 1 patch (1 patch Topical Medication Applied 12/10/22 1008)   HYDROcodone-acetaminophen (NORCO) 5-325 mg per tablet 1 tablet (1 tablet Oral Given 12/10/22 1007)       Diagnostic Studies  Results Reviewed     None                 No orders to display              Procedures  Procedures         ED Course                                             MDM  Number of Diagnoses or Management Options  Chronic foot pain, right  Diagnosis management comments: Chronic right foot pain without history exam findings no symptoms etiology/gout        Disposition  Final diagnoses:   Chronic foot pain, right     Time reflects when diagnosis was documented in both MDM as applicable and the Disposition within this note     Time User Action Codes Description Comment    12/10/2022  9:59 AM Bereket Amos [V18 336,  G89 29] Chronic foot pain, right       ED Disposition     ED Disposition   Discharge    Condition   Stable    Date/Time   Sat Dec 10, 2022  9:58 AM    Comment   Juventino Viera discharge to home/self care  Follow-up Information     Follow up With Specialties Details Why Contact Info    Ana Samano MD Family Medicine Schedule an appointment as soon as possible for a visit in 2 days  [address]  [address] [place] [address] Road [address]  [address], DO Pain Medicine Schedule an appointment as soon as possible for a visit in 2 days  [address]  [address] [place] [address] Drive [address] [phone]            Patient's Medications   Discharge Prescriptions    LIDOCAINE (LMX) 4 % CREAM    Apply topically as needed for moderate pain       Start Date: 12/10/2022End Date: --       Order Dose: --       Quantity: 30 g    Refills: 0    METHOCARBAMOL (ROBAXIN) 500 MG TABLET    Take 1 tablet (500 mg total) by mouth 2 (two) times a day       Start Date: 12/10/2022End Date: --       Order Dose: 500 mg       Quantity: 20 tablet    Refills: 0       No discharge procedures on file      PDMP Review       Value Time User    PDMP Reviewed  Yes 12/9/2022  9:32 AM Ana Samano MD          ED Provider  Electronically Signed by           Yolanda Thornton MD  12/10/22 1016

## 2022-12-13 ENCOUNTER — TELEPHONE (OUTPATIENT)
Dept: FAMILY MEDICINE CLINIC | Facility: CLINIC | Age: 52
End: 2022-12-13

## 2022-12-13 NOTE — TELEPHONE ENCOUNTER
Patient dropped off forms from [facility] to approve her for driving again  She just saw Dr Cassia Kulkarni on 12/6/22  She is aware that he is currently out of office due to an emergency  The forms are in Therea's blue folder at check in  Please call her when they are complete  She states they need to be returned to [facility] by 12/19

## 2022-12-14 ENCOUNTER — PATIENT OUTREACH (OUTPATIENT)
Dept: FAMILY MEDICINE CLINIC | Facility: CLINIC | Age: 52
End: 2022-12-14

## 2022-12-14 NOTE — PROGRESS NOTES
[facility]- Chart Review and closing referral as there has been no return calls from patient fr assistance with SNAP application  [facility] will reopen referral if patient makes contact in the future  [facility] routing note to OP CM MSW Vassie Canavan as status to closing referral at this time due to no contact

## 2022-12-16 ENCOUNTER — TELEPHONE (OUTPATIENT)
Dept: FAMILY MEDICINE CLINIC | Facility: CLINIC | Age: 52
End: 2022-12-16

## 2022-12-16 NOTE — TELEPHONE ENCOUNTER
I do understand, but she is on nucynta from pain management, and I do not want to void her pain contract

## 2022-12-16 NOTE — TELEPHONE ENCOUNTER
Patient just seen  [facility] foot care and she said we should give her pain medication for her foot pain  She uses [facility] [place] [facility]  I did tell her Dr Balaji Pascal is out until Monday

## 2022-12-19 DIAGNOSIS — R26.2 AMBULATORY DYSFUNCTION: Primary | ICD-10-CM

## 2022-12-19 DIAGNOSIS — M51.37 DDD (DEGENERATIVE DISC DISEASE), LUMBOSACRAL: ICD-10-CM

## 2022-12-19 RX ORDER — GABAPENTIN 300 MG/1
300 CAPSULE ORAL 4 TIMES DAILY
Qty: 360 CAPSULE | Refills: 1 | Status: SHIPPED | OUTPATIENT
Start: 2022-12-19

## 2022-12-19 NOTE — TELEPHONE ENCOUNTER
Patient called Rx line requesting med refill for her gabapentin  Patient in message advises she takes 4 capsules a day  Medication was last filled 3/25/22

## 2022-12-21 ENCOUNTER — PATIENT OUTREACH (OUTPATIENT)
Dept: FAMILY MEDICINE CLINIC | Facility: CLINIC | Age: 52
End: 2022-12-21

## 2022-12-21 NOTE — PROGRESS NOTES
Pt sent email with no information on it so emailed pt back  Also explained that Stephen Allen attempted several outreaches but closed her case due to no outreach  Asked pt to call or email back

## 2022-12-22 DIAGNOSIS — M79.671 RIGHT FOOT PAIN: ICD-10-CM

## 2022-12-22 RX ORDER — MELOXICAM 15 MG/1
15 TABLET ORAL DAILY
Qty: 30 TABLET | Refills: 0 | Status: SHIPPED | OUTPATIENT
Start: 2022-12-22

## 2022-12-22 NOTE — TELEPHONE ENCOUNTER
Patient called Rx line requesting refill for her meloxicam 15mg to be sent to [facility] in [facility]   Patient wanted to make sure provider wanted her on both methocarbamol and meloxicam     LOV 12/6/22, F/U non scheduled, Labs active

## 2022-12-23 DIAGNOSIS — K21.00 ESOPHAGITIS, REFLUX: ICD-10-CM

## 2022-12-23 RX ORDER — OMEPRAZOLE 40 MG/1
40 CAPSULE, DELAYED RELEASE ORAL DAILY
Qty: 90 CAPSULE | Refills: 1 | Status: SHIPPED | OUTPATIENT
Start: 2022-12-23

## 2022-12-27 ENCOUNTER — APPOINTMENT (OUTPATIENT)
Dept: LAB | Facility: CLINIC | Age: 52
End: 2022-12-27

## 2022-12-27 DIAGNOSIS — B95.61 BACTEREMIA DUE TO METHICILLIN SUSCEPTIBLE STAPHYLOCOCCUS AUREUS (MSSA): ICD-10-CM

## 2022-12-27 DIAGNOSIS — E78.2 MIXED HYPERLIPIDEMIA: ICD-10-CM

## 2022-12-27 DIAGNOSIS — M51.37 DDD (DEGENERATIVE DISC DISEASE), LUMBOSACRAL: ICD-10-CM

## 2022-12-27 DIAGNOSIS — G92.8 TOXIC METABOLIC ENCEPHALOPATHY: ICD-10-CM

## 2022-12-27 DIAGNOSIS — E44.1 MILD PROTEIN-CALORIE MALNUTRITION (HCC): ICD-10-CM

## 2022-12-27 DIAGNOSIS — R73.9 HYPERGLYCEMIA: ICD-10-CM

## 2022-12-27 DIAGNOSIS — Z72.0 TOBACCO ABUSE: ICD-10-CM

## 2022-12-27 DIAGNOSIS — R78.81 BACTEREMIA DUE TO METHICILLIN SUSCEPTIBLE STAPHYLOCOCCUS AUREUS (MSSA): ICD-10-CM

## 2022-12-27 DIAGNOSIS — T79.6XXS TRAUMATIC RHABDOMYOLYSIS, SEQUELA: ICD-10-CM

## 2022-12-27 DIAGNOSIS — N17.8 ACUTE RENAL FAILURE WITH OTHER SPECIFIED PATHOLOGICAL LESION IN KIDNEY (HCC): ICD-10-CM

## 2022-12-27 DIAGNOSIS — J69.0 ASPIRATION PNEUMONITIS (HCC): ICD-10-CM

## 2022-12-27 DIAGNOSIS — F11.20 UNCOMPLICATED OPIOID DEPENDENCE (HCC): ICD-10-CM

## 2022-12-27 DIAGNOSIS — F33.2 SEVERE EPISODE OF RECURRENT MAJOR DEPRESSIVE DISORDER, WITHOUT PSYCHOTIC FEATURES (HCC): ICD-10-CM

## 2022-12-27 DIAGNOSIS — R26.2 AMBULATORY DYSFUNCTION: ICD-10-CM

## 2022-12-27 DIAGNOSIS — G47.09 OTHER INSOMNIA: ICD-10-CM

## 2022-12-27 DIAGNOSIS — K21.00 ESOPHAGITIS, REFLUX: ICD-10-CM

## 2022-12-27 DIAGNOSIS — M79.671 RIGHT FOOT PAIN: ICD-10-CM

## 2022-12-27 LAB
ALBUMIN SERPL BCP-MCNC: 3.9 G/DL (ref 3.5–5)
ALP SERPL-CCNC: 101 U/L (ref 46–116)
ALT SERPL W P-5'-P-CCNC: 37 U/L (ref 12–78)
ANA SER QL IA: NEGATIVE
ANION GAP SERPL CALCULATED.3IONS-SCNC: 6 MMOL/L (ref 4–13)
AST SERPL W P-5'-P-CCNC: 21 U/L (ref 5–45)
B BURGDOR IGG+IGM SER-ACNC: 0.2 AI
BASOPHILS # BLD AUTO: 0.09 THOUSANDS/ÂΜL (ref 0–0.1)
BASOPHILS NFR BLD AUTO: 1 % (ref 0–1)
BILIRUB SERPL-MCNC: 0.45 MG/DL (ref 0.2–1)
BUN SERPL-MCNC: 16 MG/DL (ref 5–25)
CALCIUM SERPL-MCNC: 9.8 MG/DL (ref 8.3–10.1)
CHLORIDE SERPL-SCNC: 105 MMOL/L (ref 96–108)
CHOLEST SERPL-MCNC: 244 MG/DL
CK SERPL-CCNC: 44 U/L (ref 26–192)
CO2 SERPL-SCNC: 26 MMOL/L (ref 21–32)
CREAT SERPL-MCNC: 0.97 MG/DL (ref 0.6–1.3)
CRP SERPL QL: <3 MG/L
EOSINOPHIL # BLD AUTO: 0.03 THOUSAND/ÂΜL (ref 0–0.61)
EOSINOPHIL NFR BLD AUTO: 0 % (ref 0–6)
ERYTHROCYTE [DISTWIDTH] IN BLOOD BY AUTOMATED COUNT: 13.9 % (ref 11.6–15.1)
ERYTHROCYTE [SEDIMENTATION RATE] IN BLOOD: 36 MM/HOUR (ref 0–29)
GFR SERPL CREATININE-BSD FRML MDRD: 66 ML/MIN/1.73SQ M
GLUCOSE P FAST SERPL-MCNC: 86 MG/DL (ref 65–99)
HCT VFR BLD AUTO: 44.7 % (ref 34.8–46.1)
HDLC SERPL-MCNC: 121 MG/DL
HGB BLD-MCNC: 13.8 G/DL (ref 11.5–15.4)
IMM GRANULOCYTES # BLD AUTO: 0.04 THOUSAND/UL (ref 0–0.2)
IMM GRANULOCYTES NFR BLD AUTO: 0 % (ref 0–2)
LDLC SERPL CALC-MCNC: 63 MG/DL (ref 0–100)
LYMPHOCYTES # BLD AUTO: 3.09 THOUSANDS/ÂΜL (ref 0.6–4.47)
LYMPHOCYTES NFR BLD AUTO: 27 % (ref 14–44)
MCH RBC QN AUTO: 30.2 PG (ref 26.8–34.3)
MCHC RBC AUTO-ENTMCNC: 30.9 G/DL (ref 31.4–37.4)
MCV RBC AUTO: 98 FL (ref 82–98)
MONOCYTES # BLD AUTO: 0.33 THOUSAND/ÂΜL (ref 0.17–1.22)
MONOCYTES NFR BLD AUTO: 3 % (ref 4–12)
NEUTROPHILS # BLD AUTO: 7.78 THOUSANDS/ÂΜL (ref 1.85–7.62)
NEUTS SEG NFR BLD AUTO: 69 % (ref 43–75)
NRBC BLD AUTO-RTO: 0 /100 WBCS
PLATELET # BLD AUTO: 372 THOUSANDS/UL (ref 149–390)
PMV BLD AUTO: 9.8 FL (ref 8.9–12.7)
POTASSIUM SERPL-SCNC: 4 MMOL/L (ref 3.5–5.3)
PROT SERPL-MCNC: 7.4 G/DL (ref 6.4–8.4)
RBC # BLD AUTO: 4.57 MILLION/UL (ref 3.81–5.12)
SODIUM SERPL-SCNC: 137 MMOL/L (ref 135–147)
TRIGL SERPL-MCNC: 298 MG/DL
TSH SERPL DL<=0.05 MIU/L-ACNC: 2.01 UIU/ML (ref 0.45–4.5)
URATE SERPL-MCNC: 5.1 MG/DL (ref 2–7.5)
WBC # BLD AUTO: 11.36 THOUSAND/UL (ref 4.31–10.16)

## 2022-12-28 LAB — RHEUMATOID FACT SER QL LA: NEGATIVE

## 2022-12-30 LAB — CCP AB SER IA-ACNC: 19

## 2023-01-03 ENCOUNTER — OFFICE VISIT (OUTPATIENT)
Dept: FAMILY MEDICINE CLINIC | Facility: CLINIC | Age: 53
End: 2023-01-03

## 2023-01-03 VITALS
SYSTOLIC BLOOD PRESSURE: 122 MMHG | BODY MASS INDEX: 20.91 KG/M2 | HEIGHT: 67 IN | WEIGHT: 133.2 LBS | DIASTOLIC BLOOD PRESSURE: 72 MMHG | HEART RATE: 100 BPM

## 2023-01-03 DIAGNOSIS — F33.2 SEVERE EPISODE OF RECURRENT MAJOR DEPRESSIVE DISORDER, WITHOUT PSYCHOTIC FEATURES (HCC): ICD-10-CM

## 2023-01-03 DIAGNOSIS — N17.8 ACUTE RENAL FAILURE WITH OTHER SPECIFIED PATHOLOGICAL LESION IN KIDNEY (HCC): ICD-10-CM

## 2023-01-03 DIAGNOSIS — M79.671 RIGHT FOOT PAIN: Primary | ICD-10-CM

## 2023-01-03 DIAGNOSIS — J43.9 PULMONARY EMPHYSEMA, UNSPECIFIED EMPHYSEMA TYPE (HCC): ICD-10-CM

## 2023-01-03 DIAGNOSIS — J81.1 CHRONIC PULMONARY EDEMA: ICD-10-CM

## 2023-01-03 DIAGNOSIS — F11.20 UNCOMPLICATED OPIOID DEPENDENCE (HCC): ICD-10-CM

## 2023-01-03 DIAGNOSIS — R26.2 AMBULATORY DYSFUNCTION: ICD-10-CM

## 2023-01-03 DIAGNOSIS — R76.8 CYCLIC CITRULLINATED PEPTIDE (CCP) ANTIBODY POSITIVE: ICD-10-CM

## 2023-01-03 DIAGNOSIS — M51.37 DDD (DEGENERATIVE DISC DISEASE), LUMBOSACRAL: ICD-10-CM

## 2023-01-03 DIAGNOSIS — I10 PRIMARY HYPERTENSION: ICD-10-CM

## 2023-01-03 PROBLEM — R22.31 LOCALIZED SWELLING OF RIGHT UPPER EXTREMITY: Status: RESOLVED | Noted: 2022-09-26 | Resolved: 2023-01-03

## 2023-01-03 PROBLEM — N17.9 ARF (ACUTE RENAL FAILURE) (HCC): Status: RESOLVED | Noted: 2022-09-19 | Resolved: 2023-01-03

## 2023-01-03 RX ORDER — ALPRAZOLAM 0.5 MG/1
0.5 TABLET ORAL
Qty: 15 TABLET | Refills: 0 | Status: SHIPPED | OUTPATIENT
Start: 2023-01-03 | End: 2023-01-18

## 2023-01-03 NOTE — ASSESSMENT & PLAN NOTE
Patient had been seeing psychiatry before, but does not have an appointment scheduled  Continue on Effexor  Continue Xanax as needed  She did note that she was getting problems with sleep still  She does have Xanax, but was not sure if she wanted to use that or if so she should be on other medications  Reviewed about staying with Xanax

## 2023-01-03 NOTE — PATIENT INSTRUCTIONS
1  Right foot pain  Assessment & Plan:  Patient is continuing to have some significant issues with regard to foot pain  She was seen by podiatry, but they recommended the EMG to determine if this was indeed something wrong with podiatry issues versus other  EMG was ordered previously by this office  Again, it were is correlating to some of the issues she was having before  She has significant discomfort over the top of the foot, as well as some discomfort at bunions bilaterally on the right foot  We will see if we can move up the EMG for now  Ambulation is been quite difficult with this  Patient may not have been on the meloxicam, due to some mixup with the pharmacy  Would recommend that she try that  Reviewed about GI risks with Meloxicam     Orders:  -     Diclofenac Sodium (VOLTAREN) 1 %; Apply 2 g topically 4 (four) times a day    2  Chronic pulmonary edema  Assessment & Plan:  Following with Pulm      3  Uncomplicated opioid dependence ([facility] [address] )  Assessment & Plan:  Seeing Pain management  No changes  4  Pulmonary emphysema, unspecified emphysema type ([facility] [address] )  Assessment & Plan:  Follow with Pulm  5  Acute renal failure with other specified pathological lesion in kidney [facility])  Assessment & Plan:  GFR is now 66  Will resolve diagnosis  6  Ambulatory dysfunction  Assessment & Plan:  Secondary to pain in her right foot  Unsure as to cause for still  EMG is not scheduled till June, which was the first available  7  Primary hypertension  Assessment & Plan:  Stable  BP normal   Not currently on medications  8  DDD (degenerative disc disease), lumbosacral  Assessment & Plan:  Back worse than before  Follow with Neurosurgery  Significant issues with lumbar degeneration  With her foot being off, her back is now bothering her more  Orders:  -     Ambulatory Referral to Neurosurgery; Future    9   Severe episode of recurrent major depressive disorder, without psychotic features [facility])  Assessment & Plan:  Patient had been seeing psychiatry before, but does not have an appointment scheduled  Continue on Effexor  Continue Xanax as needed  She did note that she was getting problems with sleep still  She does have Xanax, but was not sure if she wanted to use that or if so she should be on other medications  Reviewed about staying with Xanax  Orders:  -     ALPRAZolam (XANAX) 0 5 mg tablet; Take 1 tablet (0 5 mg total) by mouth daily at bedtime as needed for anxiety or sleep for up to 15 days    10  Cyclic citrullinated peptide (CCP) antibody positive  Assessment & Plan:  Patient does have CCP that is positive, but her rheumatoid factor was negative  Follow-up with rheumatology recommended  COVID 19 Instructions    Matthewglenn Farmer was advised to limit contact with others to essential tasks such as getting food, medications, and medical care  Proper handwashing reviewed, and Hand sanitzer when washing is not available  If the patient develops symptoms of COVID 19, the patient should call the office as soon as possible  For 9251-6766 Flu season, it is strongly recommended that Flu Vaccinations be obtained  Virtual Visits:  Michelle: This works on smart phones (any phone with Internet browsing capability)  You should get a text message when the provider is ready to see you  Click on the link in the text message, and the call should start  You will need to type in your name, and allow camera and microphone access  This is HIPPA compliant, and secure  If you have not already done so, get immunized to COVID 19  We are committed to getting you vaccinated as soon as possible and will be closely following CDC and [facility]  guidelines as they are released and revised  Please refer to our COVID-19 vaccine webpage for the most up to date information on the vaccine and our distribution efforts      This site will also have the most up to date recommendations for COVID booster vaccine  Sarai ybarra    Call [phone]), option 7    OUR NEW LOCATION:    [facility]  [address], [address], [place], [address]  Fax: [phone]    Lab services and OB/GYN are at this location as well

## 2023-01-03 NOTE — ASSESSMENT & PLAN NOTE
Patient is continuing to have some significant issues with regard to foot pain  She was seen by podiatry, but they recommended the EMG to determine if this was indeed something wrong with podiatry issues versus other  EMG was ordered previously by this office  Again, it were is correlating to some of the issues she was having before  She has significant discomfort over the top of the foot, as well as some discomfort at bunions bilaterally on the right foot  We will see if we can move up the EMG for now  Ambulation is been quite difficult with this  Patient may not have been on the meloxicam, due to some mixup with the pharmacy  Would recommend that she try that      Reviewed about GI risks with Meloxicam

## 2023-01-03 NOTE — ASSESSMENT & PLAN NOTE
Back worse than before  Follow with Neurosurgery  Significant issues with lumbar degeneration  With her foot being off, her back is now bothering her more

## 2023-01-03 NOTE — PROGRESS NOTES
Name: Matti Guardado      : 1970      MRN: [id number]  Encounter Provider: Janusz Grajeda MD  Encounter Date: 1/3/2023   Encounter department: [facility] PRIMARY CARE    Assessment & Plan     1  Right foot pain  Assessment & Plan:  Patient is continuing to have some significant issues with regard to foot pain  She was seen by podiatry, but they recommended the EMG to determine if this was indeed something wrong with podiatry issues versus other  EMG was ordered previously by this office  Again, it were is correlating to some of the issues she was having before  She has significant discomfort over the top of the foot, as well as some discomfort at bunions bilaterally on the right foot  We will see if we can move up the EMG for now  Ambulation is been quite difficult with this  Patient may not have been on the meloxicam, due to some mixup with the pharmacy  Would recommend that she try that  Reviewed about GI risks with Meloxicam     Orders:  -     Diclofenac Sodium (VOLTAREN) 1 %; Apply 2 g topically 4 (four) times a day    2  Chronic pulmonary edema  Assessment & Plan:  Following with Pulm      3  Uncomplicated opioid dependence ([facility] [address] )  Assessment & Plan:  Seeing Pain management  No changes  4  Pulmonary emphysema, unspecified emphysema type ([facility] [address] )  Assessment & Plan:  Follow with Pulm  5  Acute renal failure with other specified pathological lesion in kidney [facility])  Assessment & Plan:  GFR is now 66  Will resolve diagnosis  6  Ambulatory dysfunction  Assessment & Plan:  Secondary to pain in her right foot  Unsure as to cause for still  EMG is not scheduled till , which was the first available  7  Primary hypertension  Assessment & Plan:  Stable  BP normal   Not currently on medications  8  DDD (degenerative disc disease), lumbosacral  Assessment & Plan:  Back worse than before  Follow with Neurosurgery  Significant issues with lumbar degeneration    With her foot being off, her back is now bothering her more  Orders:  -     Ambulatory Referral to Neurosurgery; Future    9  Severe episode of recurrent major depressive disorder, without psychotic features [facility])  Assessment & Plan:  Patient had been seeing psychiatry before, but does not have an appointment scheduled  Continue on Effexor  Continue Xanax as needed  She did note that she was getting problems with sleep still  She does have Xanax, but was not sure if she wanted to use that or if so she should be on other medications  Reviewed about staying with Xanax  Orders:  -     ALPRAZolam (XANAX) 0 5 mg tablet; Take 1 tablet (0 5 mg total) by mouth daily at bedtime as needed for anxiety or sleep for up to 15 days    10  Cyclic citrullinated peptide (CCP) antibody positive  Assessment & Plan:  Patient does have CCP that is positive, but her rheumatoid factor was negative  Follow-up with rheumatology recommended  Subjective      Swelling at the ankle on right  Some shooting pain with this on occasion  Has some numbness up into the leg as well  She is not sure if the swelling is the ankle, or if it was from nerve damage  Has some irritation on the right foot as well  Patient did see Podiatry for this  She has something scheduled for June        Review of Systems    Current Outpatient Medications on File Prior to Visit   Medication Sig   • gabapentin (NEURONTIN) 300 mg capsule Take 1 capsule (300 mg total) by mouth 4 (four) times a day   • lidocaine (LMX) 4 % cream Apply topically as needed for moderate pain   • meloxicam (Mobic) 15 mg tablet Take 1 tablet (15 mg total) by mouth daily   • Nucynta 50 MG tablet    • omeprazole (PriLOSEC) 40 MG capsule Take 1 capsule (40 mg total) by mouth daily   • QUEtiapine (SEROquel) 50 mg tablet Take 1 tablet (50 mg total) by mouth 2 (two) times a day   • nicotine (NICODERM CQ) 14 mg/24hr TD 24 hr patch Place 1 patch on the skin daily Do not start before November 8, 2022  (Patient not taking: Reported on 1/3/2023)   • venlafaxine (EFFEXOR-XR) 37 5 mg 24 hr capsule Take 5 capsules (187 5 mg total) by mouth daily Pt takes one a day   • [DISCONTINUED] ALPRAZolam (XANAX) 0 5 mg tablet Take 1 tablet (0 5 mg total) by mouth daily at bedtime as needed for anxiety or sleep for up to 15 days   • [DISCONTINUED] colchicine (COLCRYS) 0 6 mg tablet Take 1 tablet (0 6 mg total) by mouth 2 (two) times a day (Patient not taking: Reported on 1/3/2023)   • [DISCONTINUED] methocarbamol (ROBAXIN) 500 mg tablet Take 1 tablet (500 mg total) by mouth 2 (two) times a day (Patient not taking: Reported on 1/3/2023)   • [DISCONTINUED] metoprolol succinate (TOPROL-XL) 25 mg 24 hr tablet Take 0 5 tablets (12 5 mg total) by mouth daily (Patient not taking: Reported on 1/3/2023)   • [DISCONTINUED] predniSONE 10 mg tablet Take 3 BID for 3 days then 2 BID for 3 days then 1 BID for 3 days then 1 QD for 3 days then 1/2 QD for 3 days then stop (Patient not taking: Reported on 1/3/2023)       Objective     /72   Pulse 100   Ht 5' 7 2" (1 707 m)   Wt 60 4 kg (133 lb 3 2 oz)   LMP 03/14/2019 (Approximate)   BMI 20 74 kg/m²     Physical Exam  Vitals and nursing note reviewed  Constitutional:       Appearance: She is well-developed  HENT:      Head: Normocephalic and atraumatic  Cardiovascular:      Rate and Rhythm: Normal rate and regular rhythm  Pulses:           Carotid pulses are 2+ on the right side and 2+ on the left side  Heart sounds: Normal heart sounds  Pulmonary:      Effort: Pulmonary effort is normal       Breath sounds: Normal breath sounds  No wheezing or rales  Chest:      Chest wall: No tenderness     Musculoskeletal:        Feet:        Jenna Day MD

## 2023-01-03 NOTE — ASSESSMENT & PLAN NOTE
Patient does have CCP that is positive, but her rheumatoid factor was negative  Follow-up with rheumatology recommended

## 2023-01-03 NOTE — ASSESSMENT & PLAN NOTE
Secondary to pain in her right foot  Unsure as to cause for still  EMG is not scheduled till June, which was the first available

## 2023-01-05 ENCOUNTER — TELEPHONE (OUTPATIENT)
Dept: FAMILY MEDICINE CLINIC | Facility: CLINIC | Age: 53
End: 2023-01-05

## 2023-01-05 DIAGNOSIS — F11.20 UNCOMPLICATED OPIOID DEPENDENCE (HCC): Primary | ICD-10-CM

## 2023-01-05 DIAGNOSIS — B95.61 BACTEREMIA DUE TO METHICILLIN SUSCEPTIBLE STAPHYLOCOCCUS AUREUS (MSSA): ICD-10-CM

## 2023-01-05 DIAGNOSIS — R78.81 BACTEREMIA DUE TO METHICILLIN SUSCEPTIBLE STAPHYLOCOCCUS AUREUS (MSSA): ICD-10-CM

## 2023-01-05 RX ORDER — SACCHAROMYCES BOULARDII 250 MG
250 CAPSULE ORAL 2 TIMES DAILY
Qty: 180 CAPSULE | Refills: 3 | Status: SHIPPED | OUTPATIENT
Start: 2023-01-05

## 2023-01-05 NOTE — TELEPHONE ENCOUNTER
TH, Pt is requesting you to prescribe an Rx for a Pro Biotic if there is one, Pt states she has been getting this otc and it is very expensive, Can you prescribe this?

## 2023-01-06 ENCOUNTER — TELEPHONE (OUTPATIENT)
Dept: FAMILY MEDICINE CLINIC | Facility: CLINIC | Age: 53
End: 2023-01-06

## 2023-01-06 NOTE — TELEPHONE ENCOUNTER
Quinton SIMS Case ID: [id number] - Rx #: [id number] Need help? Call us at [phone]    Status Sent to Plan today  Drug Diclofenac Sodium 1% gel   Form Dilip Sharpe PA Form (1958 NCPDP)    [address] [place] CALL [phone]  DRUG REQUIRES PRIOR AUTHORIZATION TRANSMISSION FEE UP TO $0 28 MAY APPLY (PHARMACY HELP DESK [phone]) PRIOR AUTH [place] CALL [phone]   DRUG REQUIRES PRIOR AUTHORIZATION TRANSMISSION FEE UP TO $0 28 MAY APPLY (PHARMACY HELP DESK [phone])

## 2023-01-06 NOTE — TELEPHONE ENCOUNTER
Made patient aware Dr Sharla Covington did try to put in an script for her but it doesn't seem like her insurance will cover this, patient wanted to thank Dr Sharla Covington for trying

## 2023-01-08 DIAGNOSIS — M79.671 RIGHT FOOT PAIN: Primary | ICD-10-CM

## 2023-01-08 DIAGNOSIS — R76.8 CYCLIC CITRULLINATED PEPTIDE (CCP) ANTIBODY POSITIVE: ICD-10-CM

## 2023-01-10 NOTE — TELEPHONE ENCOUNTER
Outcome Denied on January 6  Your PA request has been denied  Additional information will be provided in the denial communication  Current plan approved criteria does not allow coverage of diclofenac gel for Pain in right foot, The policy states the requested drug may be covered when it is used for osteoarthritis pain in joints susceptible to topical treatment, such as feet, ankles, knees, hands, wrists, or elbows  Based on the policy and the information we received, your request was denied  The request was denied because it is not being used for osteoarthritis pain in a joint susceptible to topical treatment  Additional coverage criteria may apply, please review policy or plan documents for full requirements

## 2023-01-10 NOTE — TELEPHONE ENCOUNTER
Noted, so for foot pain, not covered  For osteoarthritis of foot it is  Also, available OTC if they (family) wish to purchase

## 2023-01-18 ENCOUNTER — TELEPHONE (OUTPATIENT)
Dept: OTHER | Facility: OTHER | Age: 53
End: 2023-01-18

## 2023-01-18 NOTE — TELEPHONE ENCOUNTER
Patient is calling regarding cancelling an appointment      Date/Time:1/18/23 at 8:30am    Patient was rescheduled: YES [] NO [x]    Patient requesting call back to reschedule: YES [x] NO []

## 2023-01-25 DIAGNOSIS — F33.2 SEVERE EPISODE OF RECURRENT MAJOR DEPRESSIVE DISORDER, WITHOUT PSYCHOTIC FEATURES (HCC): ICD-10-CM

## 2023-01-25 DIAGNOSIS — M79.671 RIGHT FOOT PAIN: ICD-10-CM

## 2023-01-25 RX ORDER — ALPRAZOLAM 0.5 MG/1
0.5 TABLET ORAL
Qty: 15 TABLET | Refills: 0 | Status: CANCELLED | OUTPATIENT
Start: 2023-01-25 | End: 2023-02-09

## 2023-01-25 RX ORDER — MELOXICAM 15 MG/1
15 TABLET ORAL DAILY
Qty: 30 TABLET | Refills: 1 | Status: CANCELLED | OUTPATIENT
Start: 2023-01-25

## 2023-01-29 DIAGNOSIS — M79.671 RIGHT FOOT PAIN: ICD-10-CM

## 2023-01-30 ENCOUNTER — TELEPHONE (OUTPATIENT)
Dept: FAMILY MEDICINE CLINIC | Facility: CLINIC | Age: 53
End: 2023-01-30

## 2023-01-30 DIAGNOSIS — R11.2 NAUSEA AND VOMITING, UNSPECIFIED VOMITING TYPE: Primary | ICD-10-CM

## 2023-01-30 RX ORDER — MELOXICAM 15 MG/1
TABLET ORAL
Qty: 30 TABLET | Refills: 0 | Status: SHIPPED | OUTPATIENT
Start: 2023-01-30

## 2023-01-30 NOTE — TELEPHONE ENCOUNTER
Pt called stating she has had vomiting since 6am this morning  Pt states she also has cramping but no diarrhea  Pt states her last bowel movement was 1/28 but that  Is normal for her she goes every other day  I instructed pt to try a teaspoon of liquid every 10-20 mins to try to get it to stay down  I also advised pt to try the BRAT diet  Pt is in agreement but is also requesting something for nausea and stomach cramping  Pt is aware you may not respond until the morning  I gave pt the signs of dehydration and told her if she gets worse to be evaluated at the ER

## 2023-01-31 RX ORDER — ONDANSETRON 8 MG/1
8 TABLET, ORALLY DISINTEGRATING ORAL EVERY 8 HOURS PRN
Qty: 20 TABLET | Refills: 0 | Status: SHIPPED | OUTPATIENT
Start: 2023-01-31

## 2023-01-31 NOTE — TELEPHONE ENCOUNTER
1  Nausea and vomiting, unspecified vomiting type  -     ondansetron (Zofran ODT) 8 mg disintegrating tablet;  Take 1 tablet (8 mg total) by mouth every 8 (eight) hours as needed for nausea or vomiting

## 2023-02-03 ENCOUNTER — OFFICE VISIT (OUTPATIENT)
Dept: FAMILY MEDICINE CLINIC | Facility: CLINIC | Age: 53
End: 2023-02-03

## 2023-02-03 VITALS
TEMPERATURE: 98.1 F | DIASTOLIC BLOOD PRESSURE: 70 MMHG | HEART RATE: 58 BPM | HEIGHT: 67 IN | SYSTOLIC BLOOD PRESSURE: 116 MMHG | WEIGHT: 129 LBS | BODY MASS INDEX: 20.25 KG/M2

## 2023-02-03 DIAGNOSIS — Z12.31 ENCOUNTER FOR SCREENING MAMMOGRAM FOR MALIGNANT NEOPLASM OF BREAST: ICD-10-CM

## 2023-02-03 DIAGNOSIS — I10 PRIMARY HYPERTENSION: ICD-10-CM

## 2023-02-03 DIAGNOSIS — F33.2 SEVERE EPISODE OF RECURRENT MAJOR DEPRESSIVE DISORDER, WITHOUT PSYCHOTIC FEATURES (HCC): ICD-10-CM

## 2023-02-03 DIAGNOSIS — M79.671 RIGHT FOOT PAIN: Primary | ICD-10-CM

## 2023-02-03 RX ORDER — VENLAFAXINE HYDROCHLORIDE 150 MG/1
CAPSULE, EXTENDED RELEASE ORAL
COMMUNITY
Start: 2023-01-23 | End: 2023-02-10

## 2023-02-03 RX ORDER — ALPRAZOLAM 0.5 MG/1
0.5 TABLET ORAL
Qty: 15 TABLET | Refills: 0 | Status: SHIPPED | OUTPATIENT
Start: 2023-02-03 | End: 2023-02-18

## 2023-02-03 NOTE — PATIENT INSTRUCTIONS
1  Right foot pain  Assessment & Plan:  Patient's EMG is not scheduled until July  She has been going to pain management and getting injections, which does seem to help for the pain in the foot  She is scheduled for 1 more injection with them  Continue with that  Again, I will see if the office can move the EMG sooner  Patient has been having some increasing problems with swelling in the legs as well  Recommend follow-up with podiatry at some point based on that  Again, EMG is probably more appropriate for the neuropathy questions, but with the edema podiatry may be able to be of benefit with it  Would recommend anti-inflammatory  Previously, GI distress with ibuprofen  Trial topical diclofenac, which is over-the-counter  Orders:  -     Diclofenac Sodium (VOLTAREN) 1 %; Apply 2 g topically 4 (four) times a day    2  Severe episode of recurrent major depressive disorder, without psychotic features [facility])  Assessment & Plan:  Patient does have increased problems with anxiety at times  She does continue to follow with psychiatry from before, however her provider left, and she has not been able to reschedule with them yet  Psychiatry mention to her at the last time that she spoke with them that they were rescheduling into April (letter received by patient in mid January)  Line at this time, continue on the Xanax  Reviewed about that with tramadol and Nucynta, more specifically that there can be issues with benzodiazepines and narcotics in terms of oversedation  Orders:  -     ALPRAZolam (XANAX) 0 5 mg tablet; Take 1 tablet (0 5 mg total) by mouth daily at bedtime as needed for anxiety or sleep for up to 15 days    3  Encounter for screening mammogram for malignant neoplasm of breast  -     Mammo screening bilateral w 3d & cad; Future; Expected date: 02/03/2023    4  Primary hypertension  Assessment & Plan:  Blood pressure today is excellent  No change            COVID 19 Instructions    Hi Bray Noe Miller was advised to limit contact with others to essential tasks such as getting food, medications, and medical care  Proper handwashing reviewed, and Hand sanitzer when washing is not available  If the patient develops symptoms of COVID 19, the patient should call the office as soon as possible  For [id number] Flu season, it is strongly recommended that Flu Vaccinations be obtained  Virtual Visits:  Michelle: This works on smart phones (any phone with Internet browsing capability)  You should get a text message when the provider is ready to see you  Click on the link in the text message, and the call should start  You will need to type in your name, and allow camera and microphone access  This is HIPPA compliant, and secure  If you have not already done so, get immunized to COVID 19  We are committed to getting you vaccinated as soon as possible and will be closely following CDC and [facility]  guidelines as they are released and revised  Please refer to our COVID-19 vaccine webpage for the most up to date information on the vaccine and our distribution efforts  This site will also have the most up to date recommendations for COVID booster vaccine  Sarai ybarra    Call [phone] ([phone]), option 7    OUR NEW LOCATION:    [address], [address] [place], [place] [address] [address] , [place], [address]  Fax: [phone]    Lab services and OB/GYN are at this location as well

## 2023-02-03 NOTE — ASSESSMENT & PLAN NOTE
Patient does have increased problems with anxiety at times  She does continue to follow with psychiatry from before, however her provider left, and she has not been able to reschedule with them yet  Psychiatry mention to her at the last time that she spoke with them that they were rescheduling into April (letter received by patient in mid January)  Line at this time, continue on the Xanax  Reviewed about that with tramadol and Nucynta, more specifically that there can be issues with benzodiazepines and narcotics in terms of oversedation

## 2023-02-03 NOTE — ASSESSMENT & PLAN NOTE
Patient's EMG is not scheduled until July  She has been going to pain management and getting injections, which does seem to help for the pain in the foot  She is scheduled for 1 more injection with them  Continue with that  Again, I will see if the office can move the EMG sooner  Patient has been having some increasing problems with swelling in the legs as well  Recommend follow-up with podiatry at some point based on that  Again, EMG is probably more appropriate for the neuropathy questions, but with the edema podiatry may be able to be of benefit with it  Would recommend anti-inflammatory  Previously, GI distress with ibuprofen  Trial topical diclofenac, which is over-the-counter

## 2023-02-03 NOTE — PROGRESS NOTES
Name: Neva Meade      : 1970      MRN: [id number]  Encounter Provider: Caro Aranda MD  Encounter Date: 2/3/2023   Encounter department: [place] [address]  Right foot pain  Assessment & Plan:  Patient's EMG is not scheduled until July  She has been going to pain management and getting injections, which does seem to help for the pain in the foot  She is scheduled for 1 more injection with them  Continue with that  Again, I will see if the office can move the EMG sooner  Patient has been having some increasing problems with swelling in the legs as well  Recommend follow-up with podiatry at some point based on that  Again, EMG is probably more appropriate for the neuropathy questions, but with the edema podiatry may be able to be of benefit with it  Would recommend anti-inflammatory  Previously, GI distress with ibuprofen  Trial topical diclofenac, which is over-the-counter  Orders:  -     Diclofenac Sodium (VOLTAREN) 1 %; Apply 2 g topically 4 (four) times a day    2  Severe episode of recurrent major depressive disorder, without psychotic features [facility])  Assessment & Plan:  Patient does have increased problems with anxiety at times  She does continue to follow with psychiatry from before, however her provider left, and she has not been able to reschedule with them yet  Psychiatry mention to her at the last time that she spoke with them that they were rescheduling into April (letter received by patient in mid January)  Line at this time, continue on the Xanax  Reviewed about that with tramadol and Nucynta, more specifically that there can be issues with benzodiazepines and narcotics in terms of oversedation  Orders:  -     ALPRAZolam (XANAX) 0 5 mg tablet; Take 1 tablet (0 5 mg total) by mouth daily at bedtime as needed for anxiety or sleep for up to 15 days    3   Encounter for screening mammogram for malignant neoplasm of breast  - Mammo screening bilateral w 3d & cad; Future; Expected date: 02/03/2023    4  Primary hypertension  Assessment & Plan:  Blood pressure today is excellent  No change  Subjective      Chief Complaint   Patient presents with   • Follow-up     1 month f/u to right foot  Pt has had no improvement since last visit  Pt states sometimes it will feel like pins and needles  Pt now c/o swelling to right ankle  Pt aware she is due for mammo// order placed  • Medication Refill     On pending  Patient is here to follow-up on several issues  The regard to her right foot pain, she is having increasing swelling  There is also's some pain with it  Injections from pain management have helped out quite a bit  She has another injection scheduled  EMG so far has not been able to be scheduled except for June  This was ordered quite a while ago  She does have pain medicine from pain management as well  Anxiety: Quite significant  Patient follows with psychiatry before  Her provider has left the network, and they have not been able to reschedule her  When she called within the last week or 2, she was told they were rescheduling current patients sometime in April  Would recommend that she set up for that, but I will also refill her medications  Reviewed about potential for addiction with Xanax, as well as sedation especially while she is on Nucynta and or tramadol  Hypertension: No particular concerns at the moment  Reviewed medications  Review of Systems    Current Outpatient Medications on File Prior to Visit   Medication Sig   • gabapentin (NEURONTIN) 300 mg capsule Take 1 capsule (300 mg total) by mouth 4 (four) times a day   • lidocaine (LMX) 4 % cream Apply topically as needed for moderate pain   • meloxicam (MOBIC) 15 mg tablet TAKE 1 TABLET BY MOUTH ONCE DAILY   • nicotine (NICODERM CQ) 14 mg/24hr TD 24 hr patch Place 1 patch on the skin daily Do not start before November 8, 2022     • Nucynta 50 MG tablet    • omeprazole (PriLOSEC) 40 MG capsule Take 1 capsule (40 mg total) by mouth daily   • ondansetron (Zofran ODT) 8 mg disintegrating tablet Take 1 tablet (8 mg total) by mouth every 8 (eight) hours as needed for nausea or vomiting   • QUEtiapine (SEROquel) 50 mg tablet Take 1 tablet (50 mg total) by mouth 2 (two) times a day   • saccharomyces boulardii (FLORASTOR) 250 mg capsule Take 1 capsule (250 mg total) by mouth 2 (two) times a day   • venlafaxine (EFFEXOR-XR) 150 mg 24 hr capsule    • venlafaxine (EFFEXOR-XR) 37 5 mg 24 hr capsule Take 5 capsules (187 5 mg total) by mouth daily Pt takes one a day   • [DISCONTINUED] ALPRAZolam (XANAX) 0 5 mg tablet Take 1 tablet (0 5 mg total) by mouth daily at bedtime as needed for anxiety or sleep for up to 15 days   • [DISCONTINUED] Diclofenac Sodium (VOLTAREN) 1 % Apply 2 g topically 4 (four) times a day       Objective     /70 (BP [place] Right arm, Patient Position: Sitting, Cuff Size: Standard)   Pulse 58   Temp 98 1 °F (36 7 °C) (Temporal)   Ht 5' 7 2" (1 707 m) Comment: on record  Wt 58 5 kg (129 lb)   LMP 03/14/2019 (Approximate)   BMI 20 08 kg/m²     Physical Exam  Malena Flores MD

## 2023-02-06 ENCOUNTER — TELEPHONE (OUTPATIENT)
Dept: FAMILY MEDICINE CLINIC | Facility: CLINIC | Age: 53
End: 2023-02-06

## 2023-02-06 ENCOUNTER — OFFICE VISIT (OUTPATIENT)
Dept: NEUROSURGERY | Facility: CLINIC | Age: 53
End: 2023-02-06

## 2023-02-06 VITALS
WEIGHT: 120 LBS | SYSTOLIC BLOOD PRESSURE: 117 MMHG | DIASTOLIC BLOOD PRESSURE: 87 MMHG | HEART RATE: 111 BPM | BODY MASS INDEX: 18.83 KG/M2 | TEMPERATURE: 98 F | RESPIRATION RATE: 16 BRPM | HEIGHT: 67 IN

## 2023-02-06 DIAGNOSIS — M48.062 SPINAL STENOSIS OF LUMBAR REGION WITH NEUROGENIC CLAUDICATION: Primary | ICD-10-CM

## 2023-02-06 DIAGNOSIS — M21.371 FOOT DROP, RIGHT: ICD-10-CM

## 2023-02-06 DIAGNOSIS — M51.37 DDD (DEGENERATIVE DISC DISEASE), LUMBOSACRAL: ICD-10-CM

## 2023-02-06 RX ORDER — TRAMADOL HYDROCHLORIDE 50 MG/1
50 TABLET ORAL 3 TIMES DAILY PRN
Status: ON HOLD | COMMUNITY
End: 2023-02-10 | Stop reason: SDUPTHER

## 2023-02-06 NOTE — ASSESSMENT & PLAN NOTE
· As addressed in HPI  · Asked if there was anything I could do about her right foot pain as current regimen is not efficacious -she is deferred to Dr Roger Castaneda, PM , [facility]  She has a  Established relationship for many years  Dr Roger Castaneda prescribed gabapentin, tramadol , Nucynta) insurance will not approve  Reports she has and appointment with Dr Roger Castaneda this week  · Denies urinary or bowel dysfunction retention or incontinence, or saddle anesthesia  · Demonstrates gait instability limps, "floopy right foot" cannot heel or toe walk has loss of balance  · After questioning she reports she has been clumsy when walking dragging her toes on right foot, stumbling over foot  · In discussion about ordering and AFO patient described she has this , she is advised to wear and avoid falls   · Right foot erythemic with scaly appearance on dorsum (reports she applied lidocaine to foot all day yesterday) is same temperature as left foot although patient reports foot is cold  Imagining   3/10/22 MRI Lumbar -out of network reports in media         Plan  · Reviewed MRI 3/22 --of poor quality   · Ordered MRI lumbar spine need updated imagining, new symptoms w/ neurologic deficit 2/13/23 6:15 AM  - 45 min  · Patient has not completed EMG BLE  Ordered by PCP in 2022 --remarked I wanted to be sure I eeded it and I was told it hurts  Asked check out to assist with scheduling  2/8/23 3:15 PM  - 30 min  · RTO as snpx w/ Dr Paula Goss and I or solo w/ moulding after diagnostics completed   · Strongly advised to wear right leg AFO and to avoid falls   · Red flag signs reviewed , if develop report to closest ED   · Advised if additional questions or concerns call the office     · Request RTO appointment w/ Dr Paula Goss , this is the requested Dr in 2nd opinion referral ---schedule f/u appointment as snpx w/ Pepito and I or solo seun/ Paula Goss

## 2023-02-06 NOTE — TELEPHONE ENCOUNTER
Patient is scheduled for an EMG on Wednesday 2/8 and is asking if there is anything Dr Brian Hughes can prescribe for her to help her relax during the procedure? She uses [facility]  Thank you!

## 2023-02-06 NOTE — ASSESSMENT & PLAN NOTE
MRI Lumbar  3/10/22 significant multilevel  Lumbar acquired  Degenerative spinal stenosis  abd associated  Disc protrusions    There is evidance of central  And peripheral  Spinal stenosis  At multiple levels in the lumbar  Spine Significant multilevel spondlosis i /osteoarthritis  Of the lumbar spine Spondylosisi

## 2023-02-06 NOTE — PROGRESS NOTES
Assessment/Plan:    Foot drop, right  · As addressed in HPI  · Asked if there was anything I could do about her right foot pain as current regimen is not efficacious -she is deferred to Dr Kassidy Love, PM , [facility]  She has a  Established relationship for many years  Dr Kassidy Love prescribed gabapentin, tramadol , Nucynta) insurance will not approve  Reports she has and appointment with Dr Kassidy Love this week  · Denies urinary or bowel dysfunction retention or incontinence, or saddle anesthesia  · Demonstrates gait instability limps, "floopy right foot" cannot heel or toe walk has loss of balance  · After questioning she reports she has been clumsy when walking dragging her toes on right foot, stumbling over foot  · In discussion about ordering and AFO patient described she has this , she is advised to wear and avoid falls   · Right foot erythemic with scaly appearance on dorsum (reports she applied lidocaine to foot all day yesterday) is same temperature as left foot although patient reports foot is cold  Imagining   3/10/22 MRI Lumbar -out of network reports in media         Plan  · Reviewed MRI 3/22 --of poor quality   · Ordered MRI lumbar spine need updated imagining, new symptoms w/ neurologic deficit 2/13/23 6:15 AM  - 45 min  · Patient has not completed EMG BLE  Ordered by PCP in 2022 --remarked I wanted to be sure I eeded it and I was told it hurts  Asked check out to assist with scheduling  2/8/23 3:15 PM  - 30 min  · RTO as snpx w/ Dr Komal Mcclendon and I or solo w/ moulding after diagnostics completed   · Strongly advised to wear right leg AFO and to avoid falls   · Red flag signs reviewed , if develop report to closest ED   · Advised if additional questions or concerns call the office     · Request RTO appointment w/ Dr Komal Mcclendon , this is the requested Dr in 2nd opinion referral ---schedule f/u appointment as snpx w/ Moulding and I or solo w/ Moulding        Diagnoses and all orders for this visit:    DDD (degenerative disc disease), lumbosacral  -     Ambulatory Referral to Neurosurgery        Subjective: Follow-up (F/U TO ORDER UPDATED IMAGING)   Referral to Dr Balbir Giron for her PCP , want a second opinion to Dr Beto Bynum in 2022, as D he recommended  I am here for my foot and ankle -      Patient ID: Sadia Malik is a 45 y o  female     HPI   This is a 49-year-old female well known to our service presenting for follow-up visit for her chronic right-sided low back and lower extremity pain  Last seen in the office 9/12/22 w/ DR Beto Bynum , surgery was offered L3 -S1 fusion w  Interbodies at L4-L5 and L5-S1, however  She is and active smoker , 1 PPD  She was advised will not perform fusion surgery secondary to smoking, She was advised on  tobacco cessation at all prior visits  She returns today with the main/dominate pain is in the right chin , right lower leg into the foot dorsi and planter aspect and the toes  She described neuropathic symptoms in the right chin and foot  She insist her symptoms are not coming for her back   She finally disclosed information regarding her back pain which is intermittent right sided and a localized area of pain in her mid right buttock into posterior right mid thigh, does not go below knee  Riight lower leg mid calf to lower ankel inro the foot and toes   Reports since a fall OOB around the end October 2022 beginning November pain in chin into right ankle wrap around ankle into foot on the top and bottom  Had 2 hospital admission for right foot problem    Symptom Severity  --10/10 was so bad yesterday remained I bed all day  Rubbing lidocaine on the foot  Quality of symptoms reported as -poked with needles constant , intermittent sharp jab in the foot , constant tingling, rarely numbness , shooting from bottom to top of foot , slashing with a knife along chin bone,right foot swelling (subjective), extreme coldness of the foot   shooting pain up into chin from foot followed by sensation of ice water running up the leg  Brief episodes of coldness in right foot  Aggravating factors reported as walking,  weight bearing on right leg  Relieving factors reported as lidocaine cream on foot all day yesterday pain was so severe  Wrapping in heating pads       Multimodel treatments   · PM --[facility]io [facility] ---last vist --2 weeks ago got injection in spine, back injection in back multiple times last year   · PT ---last summer was last physical therapy [facility] ---fr back jimbo , was not efficacious  HEP provided has not been doing   · Medicinal --diclofenac , gabapentin  Lidocaine ointment , meloxicam, Nucyntal is ordered but not approved by insurance, Effexor ---MDD, tramadol ---  2 tabs 3 x per da ---recently stopped use was not efficacious will discuss w/ Dr Tonio Montalvo on Wednesday   -No acetaminophen  Nicotine --use smoke continues  Daily little less than one pack per day       I spent 45 minutes with the patient today, in which greater than 50% of this time was spent in assessment, examination, impressions, reviewing imagining and recommendations for care  All questions were answered to his/her satisfaction, and contact information provided in the event additional questions arise  Patient acknowledged an understanding and agreement with plan              REVIEW OF SYSTEMS  Review of Systems   Constitutional: Positive for activity change (decreased)  HENT: Negative  Eyes: Negative  Respiratory: Negative  Cardiovascular: Negative  Gastrointestinal: Negative  Endocrine: Negative  Genitourinary: Negative  Musculoskeletal: Positive for back pain (intermittent in the low back into right hip), gait problem and myalgias (pain in the lower right leg, ankle and foot)  Skin: Negative  Allergic/Immunologic: Negative  Neurological: Positive for numbness (and tingling in lower right leg, ankle, and foot)  Hematological: Negative  Psychiatric/Behavioral: Positive for sleep disturbance  All other systems reviewed and are negative  Meds/Allergies     Current Outpatient Medications   Medication Sig Dispense Refill   • ALPRAZolam (XANAX) 0 5 mg tablet Take 1 tablet (0 5 mg total) by mouth daily at bedtime as needed for anxiety or sleep for up to 15 days 15 tablet 0   • Diclofenac Sodium (VOLTAREN) 1 % Apply 2 g topically 4 (four) times a day 150 g 2   • gabapentin (NEURONTIN) 300 mg capsule Take 1 capsule (300 mg total) by mouth 4 (four) times a day 360 capsule 1   • lidocaine (LMX) 4 % cream Apply topically as needed for moderate pain 30 g 0   • meloxicam (MOBIC) 15 mg tablet TAKE 1 TABLET BY MOUTH ONCE DAILY 30 tablet 0   • nicotine (NICODERM CQ) 14 mg/24hr TD 24 hr patch Place 1 patch on the skin daily Do not start before November 8, 2022  28 patch 0   • Nucynta 50 MG tablet      • omeprazole (PriLOSEC) 40 MG capsule Take 1 capsule (40 mg total) by mouth daily 90 capsule 1   • ondansetron (Zofran ODT) 8 mg disintegrating tablet Take 1 tablet (8 mg total) by mouth every 8 (eight) hours as needed for nausea or vomiting 20 tablet 0   • saccharomyces boulardii (FLORASTOR) 250 mg capsule Take 1 capsule (250 mg total) by mouth 2 (two) times a day 180 capsule 3   • traMADol (ULTRAM) 50 mg tablet Take 50 mg by mouth 3 (three) times a day as needed     • venlafaxine (EFFEXOR-XR) 150 mg 24 hr capsule      • venlafaxine (EFFEXOR-XR) 37 5 mg 24 hr capsule Take 5 capsules (187 5 mg total) by mouth daily Pt takes one a day       No current facility-administered medications for this visit         Allergies   Allergen Reactions   • Chantix [Varenicline]    • Ibuprofen Other (See Comments)     Upset stomach   • Lyrica [Pregabalin] Other (See Comments)     bruising   • Penicillins Other (See Comments)     ? hives   • Sulfa Antibiotics Other (See Comments)     sloughing skin in mouth   • Sulfasalazine        PAST HISTORY    Past Medical History: Diagnosis Date   • ARF (acute renal failure) ([facility] [address] ) 2022   • Aspiration pneumonitis ([facility] [address] ) 10/1/2022   • Bacteremia due to methicillin susceptible Staphylococcus aureus (MSSA) 2022   • Chronic pain disorder    • Depression    • GERD (gastroesophageal reflux disease)    • History of electroconvulsive therapy    • Localized swelling of right upper extremity 2022   • Low back pain    • Self-injurious behavior    • Suicide attempt [facility])        Past Surgical History:   Procedure Laterality Date   •  SECTION     • COLONOSCOPY     • PANCREAS SURGERY      "pseudocysts" per patient's  Ricki Infante   • MI ESOPHAGOGASTRODUODENOSCOPY TRANSORAL DIAGNOSTIC N/A 4/10/2018    Procedure: EGD AND COLONOSCOPY;  Surgeon: Christiano Almonte MD;  [place] [facility]; Service: Gastroenterology       Social History     Tobacco Use   • Smoking status: Every Day     Packs/day: 0 50     Years: 35 00     Pack years: 17 50     Types: Cigarettes   • Smokeless tobacco: Never   Vaping Use   • Vaping Use: Never used   Substance Use Topics   • Alcohol use: Not Currently     Comment: "occasional glass of wine"   • Drug use: Yes     Types: Marijuana, Cocaine     Comment: medical, MARIJUANA       Family History   Problem Relation Age of Onset   • Arthritis Mother    • Coronary artery disease Mother         MI in her 63's   • Parkinsonism Father         with falls and SDH   • Parkinsonism Paternal Grandmother    • Coronary artery disease Paternal Grandfather    • Parkinsonism Paternal Aunt    • Colon cancer Family    • Depression Neg Hx        The following portions of the patient's history were reviewed and updated as appropriate: allergies, current medications, past family history, past medical history, past social history, past surgical history and problem list       EXAM    Vitals:Blood pressure 117/87, pulse (!) 111, temperature 98 °F (36 7 °C), temperature source Temporal, resp   rate 16, height 5' 7 2" (1 707 m), weight 54 4 kg (120 lb), last menstrual period 2019 ,Body mass index is 18 68 kg/m²  Physical Exam  Vitals and nursing note reviewed  Exam conducted with a chaperone present ()  Constitutional:       General: She is not in acute distress  Appearance: Normal appearance  She is normal weight  She is not ill-appearing, toxic-appearing or diaphoretic  Comments: Cigarette odor in room   HENT:      Head: Normocephalic and atraumatic  Pulmonary:      Effort: Pulmonary effort is normal  No respiratory distress  Musculoskeletal:      Right lower leg: No edema  Left lower leg: No edema  Skin:     Findings: Erythema (right foot erythemic compared to left ) present  Neurological:      Mental Status: She is alert and oriented to person, place, and time  Coordination: Heel to Shin Test abnormal (rightto left leg)  Gait: Gait abnormal (right drop foot)  Deep Tendon Reflexes:      Reflex Scores:       Patellar reflexes are 2+ on the right side and 2+ on the left side  Achilles reflexes are 1+ on the right side and 1+ on the left side  Psychiatric:         Mood and Affect: Mood normal          Behavior: Behavior normal          Neurologic Exam     Mental Status   Oriented to person, place, and time     Level of consciousness: alert    Motor Exam   Right leg tone: decreased  Left leg tone: normal    Strength   Right abdominals: 5/5  Left abdominals: 5/5  Right iliopsoas: 5/5  Left iliopsoas: 5/5  Right quadriceps: 5/5  Left quadriceps: 5/5  Right hamstrin/5  Left hamstrin/5  Right glutei: 5/5  Left glutei: 5/5  Right anterior tibial: 0/5  Left anterior tibial: 5/5  Right gastroc: 0/5  Left gastroc: 5/5    Sensory Exam   Right leg light touch: decreased from ankle  Left leg light touch: normal    Gait, Coordination, and Reflexes     Gait  Gait: (limp , right drop foot)    Coordination   Heel to shin coordination: abnormal (rightto left leg)    Reflexes   Right patellar: 2+  Left patellar: 2+  Right achilles: 1+  Left achilles: 1+  Right ankle clonus: absent  Left pendular knee jerk: absentHeel toe walk w/ balance loss        Imaging Studies  No results found  MRI lumbar 2/3033 out of network   Report in Media   I have personally reviewed pertinent reports     and I have personally reviewed pertinent films in PACS

## 2023-02-07 NOTE — TELEPHONE ENCOUNTER
Left detailed message advising patient of the below mentioned recommendations from Dr Greyson Chavez  Patient was also advise that EMG is not painful

## 2023-02-08 ENCOUNTER — APPOINTMENT (EMERGENCY)
Dept: RADIOLOGY | Facility: HOSPITAL | Age: 53
End: 2023-02-08

## 2023-02-08 ENCOUNTER — HOSPITAL ENCOUNTER (INPATIENT)
Facility: HOSPITAL | Age: 53
LOS: 2 days | Discharge: HOME | End: 2023-02-10
Attending: EMERGENCY MEDICINE | Admitting: INTERNAL MEDICINE

## 2023-02-08 ENCOUNTER — APPOINTMENT (INPATIENT)
Dept: RADIOLOGY | Facility: HOSPITAL | Age: 53
End: 2023-02-08

## 2023-02-08 ENCOUNTER — HOSPITAL ENCOUNTER (OUTPATIENT)
Dept: NEUROLOGY | Facility: CLINIC | Age: 53
Discharge: HOME | End: 2023-02-08

## 2023-02-08 DIAGNOSIS — M21.371 FOOT DROP, RIGHT: ICD-10-CM

## 2023-02-08 DIAGNOSIS — E72.20 HYPERAMMONEMIA (HCC): ICD-10-CM

## 2023-02-08 DIAGNOSIS — J96.01 ACUTE RESPIRATORY FAILURE WITH HYPOXIA (HCC): ICD-10-CM

## 2023-02-08 DIAGNOSIS — R26.2 AMBULATORY DYSFUNCTION: ICD-10-CM

## 2023-02-08 DIAGNOSIS — N39.0 UTI (URINARY TRACT INFECTION): ICD-10-CM

## 2023-02-08 DIAGNOSIS — M79.671 RIGHT FOOT PAIN: ICD-10-CM

## 2023-02-08 DIAGNOSIS — R41.82 ALTERED MENTAL STATUS: Primary | ICD-10-CM

## 2023-02-08 PROBLEM — R94.31 PROLONGED Q-T INTERVAL ON ECG: Status: ACTIVE | Noted: 2023-02-08

## 2023-02-08 LAB
2HR DELTA HS TROPONIN: 1 NG/L
4HR DELTA HS TROPONIN: 2 NG/L
ALBUMIN SERPL BCP-MCNC: 2.6 G/DL (ref 3.5–5)
ALP SERPL-CCNC: 133 U/L (ref 46–116)
ALT SERPL W P-5'-P-CCNC: 304 U/L (ref 12–78)
AMMONIA PLAS-SCNC: 104 UMOL/L (ref 11–35)
AMPHETAMINES SERPL QL SCN: NEGATIVE
ANION GAP SERPL CALCULATED.3IONS-SCNC: 4 MMOL/L (ref 4–13)
APAP SERPL-MCNC: 2 UG/ML (ref 10–20)
AST SERPL W P-5'-P-CCNC: 74 U/L (ref 5–45)
BACTERIA UR QL AUTO: ABNORMAL /HPF
BARBITURATES UR QL: NEGATIVE
BASE EX.OXY STD BLDV CALC-SCNC: 91.2 % (ref 60–80)
BASE EXCESS BLDA CALC-SCNC: -2 MMOL/L (ref -2–3)
BASE EXCESS BLDV CALC-SCNC: -6.4 MMOL/L
BASOPHILS # BLD AUTO: 0.06 THOUSANDS/ÂΜL (ref 0–0.1)
BASOPHILS NFR BLD AUTO: 1 % (ref 0–1)
BENZODIAZ UR QL: NEGATIVE
BILIRUB SERPL-MCNC: 0.4 MG/DL (ref 0.2–1)
BILIRUB UR QL STRIP: NEGATIVE
BUN SERPL-MCNC: 8 MG/DL (ref 5–25)
CA-I BLD-SCNC: 1.27 MMOL/L (ref 1.12–1.32)
CALCIUM ALBUM COR SERPL-MCNC: 8.6 MG/DL (ref 8.3–10.1)
CALCIUM SERPL-MCNC: 7.5 MG/DL (ref 8.3–10.1)
CARDIAC TROPONIN I PNL SERPL HS: 3 NG/L
CARDIAC TROPONIN I PNL SERPL HS: 4 NG/L
CARDIAC TROPONIN I PNL SERPL HS: 5 NG/L
CHLORIDE SERPL-SCNC: 113 MMOL/L (ref 96–108)
CLARITY UR: ABNORMAL
CO2 SERPL-SCNC: 22 MMOL/L (ref 21–32)
COCAINE UR QL: NEGATIVE
COLOR UR: ABNORMAL
CREAT SERPL-MCNC: 0.69 MG/DL (ref 0.6–1.3)
D DIMER PPP FEU-MCNC: 1.02 UG/ML FEU
EOSINOPHIL # BLD AUTO: 0.05 THOUSAND/ÂΜL (ref 0–0.61)
EOSINOPHIL NFR BLD AUTO: 1 % (ref 0–6)
ERYTHROCYTE [DISTWIDTH] IN BLOOD BY AUTOMATED COUNT: 16.4 % (ref 11.6–15.1)
ETHANOL SERPL-MCNC: 144 MG/DL (ref 0–3)
FLUAV RNA RESP QL NAA+PROBE: NEGATIVE
FLUBV RNA RESP QL NAA+PROBE: NEGATIVE
GFR SERPL CREATININE-BSD FRML MDRD: 99 ML/MIN/1.73SQ M
GLUCOSE SERPL-MCNC: 125 MG/DL (ref 65–140)
GLUCOSE SERPL-MCNC: 148 MG/DL (ref 65–140)
GLUCOSE SERPL-MCNC: 191 MG/DL (ref 65–140)
GLUCOSE SERPL-MCNC: 192 MG/DL (ref 65–140)
GLUCOSE SERPL-MCNC: 66 MG/DL (ref 65–140)
GLUCOSE UR STRIP-MCNC: ABNORMAL MG/DL
HCO3 BLDA-SCNC: 23.1 MMOL/L (ref 24–30)
HCO3 BLDV-SCNC: 21.5 MMOL/L (ref 24–30)
HCT VFR BLD AUTO: 39.2 % (ref 34.8–46.1)
HCT VFR BLD CALC: 40 % (ref 34.8–46.1)
HGB BLD-MCNC: 12.4 G/DL (ref 11.5–15.4)
HGB BLDA-MCNC: 13.6 G/DL (ref 11.5–15.4)
HGB UR QL STRIP.AUTO: NEGATIVE
IMM GRANULOCYTES # BLD AUTO: 0.03 THOUSAND/UL (ref 0–0.2)
IMM GRANULOCYTES NFR BLD AUTO: 0 % (ref 0–2)
KETONES UR STRIP-MCNC: NEGATIVE MG/DL
LACTATE SERPL-SCNC: 0.9 MMOL/L (ref 0.5–2)
LEUKOCYTE ESTERASE UR QL STRIP: ABNORMAL
LYMPHOCYTES # BLD AUTO: 2.46 THOUSANDS/ÂΜL (ref 0.6–4.47)
LYMPHOCYTES NFR BLD AUTO: 24 % (ref 14–44)
MCH RBC QN AUTO: 30.1 PG (ref 26.8–34.3)
MCHC RBC AUTO-ENTMCNC: 31.6 G/DL (ref 31.4–37.4)
MCV RBC AUTO: 95 FL (ref 82–98)
METHADONE UR QL: NEGATIVE
MONOCYTES # BLD AUTO: 0.75 THOUSAND/ÂΜL (ref 0.17–1.22)
MONOCYTES NFR BLD AUTO: 7 % (ref 4–12)
NEUTROPHILS # BLD AUTO: 6.77 THOUSANDS/ÂΜL (ref 1.85–7.62)
NEUTS SEG NFR BLD AUTO: 67 % (ref 43–75)
NITRITE UR QL STRIP: NEGATIVE
NON-SQ EPI CELLS URNS QL MICRO: ABNORMAL /HPF
NRBC BLD AUTO-RTO: 0 /100 WBCS
O2 CT BLDV-SCNC: 15.2 ML/DL
OPIATES UR QL SCN: NEGATIVE
OXYCODONE+OXYMORPHONE UR QL SCN: NEGATIVE
PCO2 BLD: 24 MMOL/L (ref 21–32)
PCO2 BLD: 39 MM HG (ref 42–50)
PCO2 BLDV: 53.6 MM HG (ref 42–50)
PCP UR QL: NEGATIVE
PH BLD: 7.38 [PH] (ref 7.3–7.4)
PH BLDV: 7.22 [PH] (ref 7.3–7.4)
PH UR STRIP.AUTO: 5.5 [PH]
PLATELET # BLD AUTO: 288 THOUSANDS/UL (ref 149–390)
PMV BLD AUTO: 9.3 FL (ref 8.9–12.7)
PO2 BLD: 48 MM HG (ref 35–45)
PO2 BLDV: 196.5 MM HG (ref 35–45)
POTASSIUM BLD-SCNC: 3.8 MMOL/L (ref 3.5–5.3)
POTASSIUM SERPL-SCNC: 3.9 MMOL/L (ref 3.5–5.3)
PROT SERPL-MCNC: 4.7 G/DL (ref 6.4–8.4)
PROT UR STRIP-MCNC: NEGATIVE MG/DL
RBC # BLD AUTO: 4.12 MILLION/UL (ref 3.81–5.12)
RBC #/AREA URNS AUTO: ABNORMAL /HPF
RSV RNA RESP QL NAA+PROBE: NEGATIVE
SALICYLATES SERPL-MCNC: <3 MG/DL (ref 3–20)
SAO2 % BLD FROM PO2: 82 % (ref 60–85)
SARS-COV-2 RNA RESP QL NAA+PROBE: NEGATIVE
SODIUM BLD-SCNC: 137 MMOL/L (ref 136–145)
SODIUM SERPL-SCNC: 139 MMOL/L (ref 135–147)
SP GR UR STRIP.AUTO: 1 (ref 1–1.03)
SPECIMEN SOURCE: ABNORMAL
THC UR QL: NEGATIVE
UROBILINOGEN UR STRIP-ACNC: <2 MG/DL
WBC # BLD AUTO: 10.12 THOUSAND/UL (ref 4.31–10.16)
WBC #/AREA URNS AUTO: ABNORMAL /HPF

## 2023-02-08 PROCEDURE — 0BH17EZ INSERTION OF ENDOTRACHEAL AIRWAY INTO TRACHEA, VIA NATURAL OR ARTIFICIAL OPENING: ICD-10-PCS | Performed by: EMERGENCY MEDICINE

## 2023-02-08 PROCEDURE — 5A1935Z RESPIRATORY VENTILATION, LESS THAN 24 CONSECUTIVE HOURS: ICD-10-PCS | Performed by: EMERGENCY MEDICINE

## 2023-02-08 RX ORDER — FENTANYL CITRATE-0.9 % NACL/PF 10 MCG/ML
50 PLASTIC BAG, INJECTION (ML) INTRAVENOUS CONTINUOUS
Status: DISCONTINUED | OUTPATIENT
Start: 2023-02-08 | End: 2023-02-08

## 2023-02-08 RX ORDER — NOREPINEPHRINE BITARTRATE 1 MG/ML
INJECTION, SOLUTION INTRAVENOUS
Status: COMPLETED
Start: 2023-02-08 | End: 2023-02-08

## 2023-02-08 RX ORDER — CHLORHEXIDINE GLUCONATE 0.12 MG/ML
15 RINSE ORAL EVERY 12 HOURS SCHEDULED
Status: DISCONTINUED | OUTPATIENT
Start: 2023-02-08 | End: 2023-02-09

## 2023-02-08 RX ORDER — CHLORHEXIDINE GLUCONATE 0.12 MG/ML
15 RINSE ORAL EVERY 12 HOURS SCHEDULED
Status: DISCONTINUED | OUTPATIENT
Start: 2023-02-08 | End: 2023-02-08

## 2023-02-08 RX ORDER — LACTULOSE 20 G/30ML
20 SOLUTION ORAL 3 TIMES DAILY
Status: DISCONTINUED | OUTPATIENT
Start: 2023-02-08 | End: 2023-02-09

## 2023-02-08 RX ORDER — DEXTROSE MONOHYDRATE 25 G/50ML
25 INJECTION, SOLUTION INTRAVENOUS ONCE
Status: COMPLETED | OUTPATIENT
Start: 2023-02-08 | End: 2023-02-08

## 2023-02-08 RX ORDER — ENOXAPARIN SODIUM 100 MG/ML
40 INJECTION SUBCUTANEOUS
Status: DISCONTINUED | OUTPATIENT
Start: 2023-02-08 | End: 2023-02-10 | Stop reason: HOSPADM

## 2023-02-08 RX ORDER — PANTOPRAZOLE SODIUM 40 MG/1
40 TABLET, DELAYED RELEASE ORAL
Status: DISCONTINUED | OUTPATIENT
Start: 2023-02-09 | End: 2023-02-10 | Stop reason: HOSPADM

## 2023-02-08 RX ORDER — KETAMINE HCL IN NACL, ISO-OSM 100MG/10ML
100 SYRINGE (ML) INJECTION ONCE
Status: COMPLETED | OUTPATIENT
Start: 2023-02-08 | End: 2023-02-08

## 2023-02-08 RX ORDER — DEXTROSE MONOHYDRATE 25 G/50ML
INJECTION, SOLUTION INTRAVENOUS
Status: COMPLETED
Start: 2023-02-08 | End: 2023-02-08

## 2023-02-08 RX ORDER — NALOXONE HYDROCHLORIDE 1 MG/ML
INJECTION INTRAMUSCULAR; INTRAVENOUS; SUBCUTANEOUS
Status: COMPLETED
Start: 2023-02-08 | End: 2023-02-08

## 2023-02-08 RX ORDER — VECURONIUM BROMIDE 1 MG/ML
10 INJECTION, POWDER, LYOPHILIZED, FOR SOLUTION INTRAVENOUS ONCE
Status: COMPLETED | OUTPATIENT
Start: 2023-02-08 | End: 2023-02-08

## 2023-02-08 RX ORDER — SODIUM CHLORIDE, SODIUM GLUCONATE, SODIUM ACETATE, POTASSIUM CHLORIDE, MAGNESIUM CHLORIDE, SODIUM PHOSPHATE, DIBASIC, AND POTASSIUM PHOSPHATE .53; .5; .37; .037; .03; .012; .00082 G/100ML; G/100ML; G/100ML; G/100ML; G/100ML; G/100ML; G/100ML
75 INJECTION, SOLUTION INTRAVENOUS CONTINUOUS
Status: DISCONTINUED | OUTPATIENT
Start: 2023-02-08 | End: 2023-02-09

## 2023-02-08 RX ORDER — PROPOFOL 10 MG/ML
5-50 INJECTION, EMULSION INTRAVENOUS
Status: DISCONTINUED | OUTPATIENT
Start: 2023-02-08 | End: 2023-02-08

## 2023-02-08 RX ORDER — SUCCINYLCHOLINE/SOD CL,ISO/PF 100 MG/5ML
100 SYRINGE (ML) INTRAVENOUS ONCE
Status: COMPLETED | OUTPATIENT
Start: 2023-02-08 | End: 2023-02-08

## 2023-02-08 RX ADMIN — ENOXAPARIN SODIUM 40 MG: 40 INJECTION SUBCUTANEOUS at 23:47

## 2023-02-08 RX ADMIN — NOREPINEPHRINE BITARTRATE 4 MG: 1 INJECTION, SOLUTION, CONCENTRATE INTRAVENOUS at 16:00

## 2023-02-08 RX ADMIN — Medication 100 MG: at 17:50

## 2023-02-08 RX ADMIN — LACTULOSE 20 G: 20 SOLUTION ORAL at 23:47

## 2023-02-08 RX ADMIN — Medication 50 MCG/HR: at 17:54

## 2023-02-08 RX ADMIN — IOHEXOL 85 ML: 350 INJECTION, SOLUTION INTRAVENOUS at 17:08

## 2023-02-08 RX ADMIN — HYDROCORTISONE SODIUM SUCCINATE 100 MG: 100 INJECTION, POWDER, FOR SOLUTION INTRAMUSCULAR; INTRAVENOUS at 16:25

## 2023-02-08 RX ADMIN — SODIUM CHLORIDE 1000 ML: 0.9 INJECTION, SOLUTION INTRAVENOUS at 16:00

## 2023-02-08 RX ADMIN — PROPOFOL 5 MCG/KG/MIN: 10 INJECTION, EMULSION INTRAVENOUS at 20:51

## 2023-02-08 RX ADMIN — DEXTROSE MONOHYDRATE 25 ML: 25 INJECTION, SOLUTION INTRAVENOUS at 16:00

## 2023-02-08 RX ADMIN — NALOXONE HYDROCHLORIDE 2 MG: 1 INJECTION PARENTERAL at 16:00

## 2023-02-08 RX ADMIN — VECURONIUM BROMIDE 10 MG: 10 INJECTION, POWDER, LYOPHILIZED, FOR SOLUTION INTRAVENOUS at 18:28

## 2023-02-08 RX ADMIN — CHLORHEXIDINE GLUCONATE 0.12% ORAL RINSE 15 ML: 1.2 LIQUID ORAL at 20:54

## 2023-02-08 RX ADMIN — SODIUM CHLORIDE, SODIUM GLUCONATE, SODIUM ACETATE, POTASSIUM CHLORIDE, MAGNESIUM CHLORIDE, SODIUM PHOSPHATE, DIBASIC, AND POTASSIUM PHOSPHATE 75 ML/HR: .53; .5; .37; .037; .03; .012; .00082 INJECTION, SOLUTION INTRAVENOUS at 20:50

## 2023-02-08 NOTE — H&P
H&P Exam - [address] 45 y o  female MRN: [id number]  Unit/Bed#: MICU 08 Encounter: [id number]      -------------------------------------------------------------------------------------------------------------  Chief Complaint: AMS    History of Present Illness   HX and PE limited by: intubated  Sabina Olmstead is a 45 y o  female with PMH chronic pulmonary edema, chronic opioid use, COPD, HTN, and depression with history of suicide attempt who presents with altered mental status  As per patient's , she was in her normal state of health earlier today  Patient reportedly was drinking alcohol earlier and took some Xanax for fear of her scheduled EMG today being painful  They were driving to the office for EMG studies of her chronic right lower extremity pain and parethesias  They had an argument, and she became very agitated and angry  Apparently when they arrived, she fell out of the car but did not hit her head  She became poorly responsive, so her  called for help  She was then brought to the ED, where she was found to be hypotensive, hypoxic, and minimally responsive  BG on arrival was 55  She was stabilized with fluids, pressors, and IV glucose  On route to CT, she was intubated for airway protection secondary to worsening mental status  Her liver enzymes were elvated with , AST 74, alk phos 133, and ammonia 104  Coma panel positive for ethanol of 144  D dimer was 1 02, so CT PE study performed which was negative for acute PE  There was dense bibasilar consolidation  CT head with evidence of possible toxic encephalopathy  UDS was negative  Patient admitted to MICU for further monitoring of her AMS  Of note, she recently finished a course of steroids for a gout flare  She also had an admission for 2 months ending last November for metabolic encephalopathy presumed secondary to ANUJA, morphine, and gabapentin use      History obtained from chart review and    -------------------------------------------------------------------------------------------------------------  Assessment and Plan:    Neuro:   • Diagnosis: Acute metabolic encephalopathy  o Presumed secondary to alcohol intoxication with possible coingestion of benzos  o 2/8 CT head- periventricular white matter hypodensity is similar to prior MRI and has been attributed to possible toxic encephalopathy   o Wean to extubate    • Diagnosis: Depression with history of suicide attempt  o Hold home 187 5 mg venlafaxine daily and 0 5 mg alprazolam PRN with AMS  • Diagnosis: Chronic pain disorder  o Hold home 50 mg tramadol TID and gabapentin 300 mg 4x daily with AMS  • Pain control and sedation with propofol and fentanyl  • CAM-ICU precautions      CV:   • Diagnosis: HTN  o Patient is not on medication at home  o Continue to monitor  • Diagnosis: Prolonged QTc  o 2/8 QTc 487  o 11/25/22 QTc 439  o Avoid QT prolonging meds      Pulm:  • Diagnosis: Acute hypoxic respiratory failure  o Secondary to AMS  o 2/8 PE study- Dense bibasilar consolidation could represent pneumonia or atelectasis  o CT findings possible her chronic pulmonary edema  o 2/8 intubated for airway protection  o Continue pressure support with goal to extubate  • Diagnosis: Chronic pulmonary edema  o Patient apparently follows with outpatient pulmonologist as per PCP, but no Epic notes  o Will clarify with patient if successful extubation  • SpO2 goal>92%      GI:   • Diagnosis: Elevated liver enzymes  o Presumed secondary to acute encephalopathy or possible drug-induced injury  o Continue to trend  • Diagnosis: Hyperammonemia  o Likely secondary to concomitant alcohol and benzo use  o Start 20 g lactulose TID  • Diagnosis: Abnormal CT finding  o 2/8 CT PE study- Bright intraluminal contrast in the stomach is noted on image 2/240  If the patient did not ingest oral contrast, the possibility of a gastric bleed is raised    Correlate clinically  o Follow hgb  • Diagnosis: GERD  o Patient normally on 40 mg omeprazole daily but non-formulary here  o Continue pantoprazole 40 mg daily  • GI prophylaxis: None  • Bowel regimen: None      :   • No acute concerns  • Monitor I+Os      F/E/N:   • F: 75 mL/hr isolyte  • E: Replete for Mg>2, K>4, Phos>3   • N: NPO      Heme/Onc:   • No acute concerns  • DVT prophylaxis: SubQ heparin      Endo:   • No acute concerns  • Q6H fingerstick given hypoglycemia on arrival      ID:   • No acute concerns  • Follow fever curve and WBC      MSK/Skin:   • Frequent position changes to avoid pressure wounds  • PT/OT as tolerated      Disposition: Admit to Critical Care   Code Status: Level 1 - Full Code  --------------------------------------------------------------------------------------------------------------  Review of Systems   Unable to perform ROS: Intubated       A 12-point, complete review of systems was reviewed and negative except as stated above     Physical Exam  Vitals and nursing note reviewed  Constitutional:       Appearance: She is ill-appearing  Comments: Sedated but recently paralyzed   HENT:      Right Ear: External ear normal       Left Ear: External ear normal    Eyes:      Pupils: Pupils are equal, round, and reactive to light  Cardiovascular:      Rate and Rhythm: Normal rate and regular rhythm  Pulses: Normal pulses  Heart sounds: Normal heart sounds  Pulmonary:      Effort: Pulmonary effort is normal  No respiratory distress  Breath sounds: Normal breath sounds  Comments: Intubated  Abdominal:      General: Abdomen is flat  Palpations: Abdomen is soft  Musculoskeletal:         General: Normal range of motion  Cervical back: Normal range of motion and neck supple  Right lower leg: No edema  Left lower leg: No edema  Skin:     General: Skin is warm and dry     Neurological:      Comments: Paralyzed --------------------------------------------------------------------------------------------------------------  Vitals:   Vitals:    02/08/23 2030 02/08/23 2100 02/08/23 2200 02/08/23 2230   BP: 122/78 142/88 115/76 119/74   BP [place]    Right arm   Pulse: 96 100 98 (!) 106   Resp: 16 17 (!) 26 19   Temp: 98 2 °F (36 8 °C) 98 6 °F (37 °C) 98 6 °F (37 °C) 98 6 °F (37 °C)   TempSrc: Bladder   Bladder   SpO2: 100% 100% 98% 98%   Weight:         Temp  Min: 95 4 °F (35 2 °C)  Max: 98 6 °F (37 °C)        Body mass index is 19 91 kg/m²        Laboratory and Diagnostics:  Results from last 7 days   Lab Units 02/08/23  1556 02/08/23  1550   WBC Thousand/uL  --  10 12   HEMOGLOBIN g/dL  --  12 4   I STAT HEMOGLOBIN g/dl 13 6  --    HEMATOCRIT %  --  39 2   HEMATOCRIT, ISTAT % 40  --    PLATELETS Thousands/uL  --  288   NEUTROS PCT %  --  67   MONOS PCT %  --  7     Results from last 7 days   Lab Units 02/08/23  1556 02/08/23  1550   SODIUM mmol/L  --  139   POTASSIUM mmol/L  --  3 9   CHLORIDE mmol/L  --  113*   CO2 mmol/L  --  22   CO2, I-STAT mmol/L 24  --    ANION GAP mmol/L  --  4   BUN mg/dL  --  8   CREATININE mg/dL  --  0 69   CALCIUM mg/dL  --  7 5*   GLUCOSE RANDOM mg/dL  --  192*   ALT U/L  --  304*   AST U/L  --  74*   ALK PHOS U/L  --  133*   ALBUMIN g/dL  --  2 6*   TOTAL BILIRUBIN mg/dL  --  0 40                   Results from last 7 days   Lab Units 02/08/23  1550   LACTIC ACID mmol/L 0 9     ABG:    VBG:  Results from last 7 days   Lab Units 02/08/23  1550   PH CHARLES  7 221*   PCO2 CHARLES mm Hg 53 6*   PO2 CHARLES mm Hg 196 5*   HCO3 CHARLES mmol/L 21 5*   BASE EXC CHARLES mmol/L -6 4           Micro:         EKG: sinus rhythm, prolonged QTc of 487  Imaging: I have personally reviewed pertinent films in PACS    Historical Information   Past Medical History:   Diagnosis Date   • ARF (acute renal failure) ([facility] ) 9/19/2022   • Asp[facility] pneum[facility] ([facility] ) 10/1/2022   • Bacteremia due to methicillin susceptible Staphylococcus aureus (MSSA) 2022   • Chronic pain disorder    • Depression    • GERD (gastroesophageal reflux disease)    • History of electroconvulsive therapy    • Localized swelling of right upper extremity 2022   • Low back pain    • Self-injurious behavior    • Suicide attempt [facility])      Past Surgical History:   Procedure Laterality Date   •  SECTION     • COLONOSCOPY     • PANCREAS SURGERY      "pseudocysts" per patient's  Ricki Infante   • IN ESOPHAGOGASTRODUODENOSCOPY TRANSORAL DIAGNOSTIC N/A 4/10/2018    Procedure: EGD AND COLONOSCOPY;  Surgeon: Antonietta Curling, MD;  [place] [facility];   Service: Gastroenterology     Social History   Social History     Substance and Sexual Activity   Alcohol Use Not Currently    Comment: "occasional glass of wine"     Social History     Substance and Sexual Activity   Drug Use Yes   • Types: Marijuana, Cocaine    Comment: medical, MARIJUANA     Social History     Tobacco Use   Smoking Status Every Day   • Packs/day: 0 50   • Years: 35 00   • Pack years: 17 50   • Types: Cigarettes   Smokeless Tobacco Never     Family History:   Family History   Problem Relation Age of Onset   • Arthritis Mother    • Coronary artery disease Mother         MI in her 63's   • Parkinsonism Father         with falls and SDH   • Parkinsonism Paternal Grandmother    • Coronary artery disease Paternal Grandfather    • Parkinsonism Paternal Aunt    • Colon cancer Family    • Depression Neg Hx      I have reviewed this patient's family history and commented on sigificant items within the HPI      Medications:  Current Facility-Administered Medications   Medication Dose Route Frequency   • chlorhexidine (PERIDEX) 0 12 % oral rinse 15 mL  15 mL Mouth/Throat Q12H [address]   • enoxaparin (LOVENOX) subcutaneous injection 40 mg  40 mg Subcutaneous Q24H [address]   • lactulose oral solution 20 g  20 g Oral TID   • multi-electrolyte (PLASMALYTE-A/ISOLYTE-S PH 7 4) IV solution  75 mL/hr Intravenous Continuous     Home medications:  Prior to Admission Medications   Prescriptions Last Dose Informant Patient Reported? Taking? ALPRAZolam (XANAX) 0 5 mg tablet   No No   Sig: Take 1 tablet (0 5 mg total) by mouth daily at bedtime as needed for anxiety or sleep for up to 15 days   Diclofenac Sodium (VOLTAREN) 1 %   No No   Sig: Apply 2 g topically 4 (four) times a day   Nucynta 50 MG tablet   Yes No   gabapentin (NEURONTIN) 300 mg capsule   No No   Sig: Take 1 capsule (300 mg total) by mouth 4 (four) times a day   lidocaine (LMX) 4 % cream   No No   Sig: Apply topically as needed for moderate pain   meloxicam (MOBIC) 15 mg tablet   No No   Sig: TAKE 1 TABLET BY MOUTH ONCE DAILY   nicotine (NICODERM CQ) 14 mg/24hr TD 24 hr patch   No No   Sig: Place 1 patch on the skin daily Do not start before November 8, 2022  omeprazole (PriLOSEC) 40 MG capsule   No No   Sig: Take 1 capsule (40 mg total) by mouth daily   ondansetron (Zofran ODT) 8 mg disintegrating tablet   No No   Sig: Take 1 tablet (8 mg total) by mouth every 8 (eight) hours as needed for nausea or vomiting   saccharomyces boulardii (FLORASTOR) 250 mg capsule   No No   Sig: Take 1 capsule (250 mg total) by mouth 2 (two) times a day   traMADol (ULTRAM) 50 mg tablet   Yes No   Sig: Take 50 mg by mouth 3 (three) times a day as needed   venlafaxine (EFFEXOR-XR) 150 mg 24 hr capsule   Yes No   venlafaxine (EFFEXOR-XR) 37 5 mg 24 hr capsule   No No   Sig: Take 5 capsules (187 5 mg total) by mouth daily Pt takes one a day      Facility-Administered Medications: None     Allergies:   Allergies   Allergen Reactions   • Chantix [Varenicline]    • Ibuprofen Other (See Comments)     Upset stomach   • Lyrica [Pregabalin] Other (See Comments)     bruising   • Penicillins Other (See Comments)     ? hives   • Sulfa Antibiotics Other (See Comments)     sloughing skin in mouth   • Sulfasalazine ------------------------------------------------------------------------------------------------------------  Advance Directive and Living Will:      Power of :    POLST:    ------------------------------------------------------------------------------------------------------------  Anticipated Length of Stay is > 2 midnights    Care Time Delivered:   No Critical Care time spent       Valentina Sykes MD        Portions of the record may have been created with voice recognition software  Occasional wrong word or "sound a like" substitutions may have occurred due to the inherent limitations of voice recognition software    Read the chart carefully and recognize, using context, where substitutions have occurred

## 2023-02-08 NOTE — ED PROVIDER NOTES
History  Chief Complaint   Patient presents with   • Fall     Witnessed, no head strike, change in MS     75-year-old female with history of depression, prior suicide attempt, presents to the emergency department due to altered mental status  Patient was coming to an EMG appointment today  Her  notes that she became agitated while in the car and when they parked, she stumbled out of the door and fell onto her hands and knees  She was confused but he was able to get her into the appointment at which time she was sat down in a wheelchair  She progressively worsened and they called a medical emergency and brought her to the ED  She was slumped in a wheelchair on arrival   notes that she takes benzodiazepines and tramadol but is unaware if she took too many medications  She hadn't been complaining of anything other than right foot pain  He denies other drug use  Prior to Admission Medications   Prescriptions Last Dose Informant Patient Reported? Taking? ALPRAZolam (XANAX) 0 5 mg tablet   No No   Sig: Take 1 tablet (0 5 mg total) by mouth daily at bedtime as needed for anxiety or sleep for up to 15 days   Diclofenac Sodium (VOLTAREN) 1 %   No No   Sig: Apply 2 g topically 4 (four) times a day   Nucynta 50 MG tablet   Yes No   gabapentin (NEURONTIN) 300 mg capsule   No No   Sig: Take 1 capsule (300 mg total) by mouth 4 (four) times a day   lidocaine (LMX) 4 % cream   No No   Sig: Apply topically as needed for moderate pain   meloxicam (MOBIC) 15 mg tablet   No No   Sig: TAKE 1 TABLET BY MOUTH ONCE DAILY   nicotine (NICODERM CQ) 14 mg/24hr TD 24 hr patch   No No   Sig: Place 1 patch on the skin daily Do not start before November 8, 2022     omeprazole (PriLOSEC) 40 MG capsule   No No   Sig: Take 1 capsule (40 mg total) by mouth daily   ondansetron (Zofran ODT) 8 mg disintegrating tablet   No No   Sig: Take 1 tablet (8 mg total) by mouth every 8 (eight) hours as needed for nausea or vomiting saccharomyces boulardii (FLORASTOR) 250 mg capsule   No No   Sig: Take 1 capsule (250 mg total) by mouth 2 (two) times a day   traMADol (ULTRAM) 50 mg tablet   Yes No   Sig: Take 50 mg by mouth 3 (three) times a day as needed   venlafaxine (EFFEXOR-XR) 150 mg 24 hr capsule   Yes No   venlafaxine (EFFEXOR-XR) 37 5 mg 24 hr capsule   No No   Sig: Take 5 capsules (187 5 mg total) by mouth daily Pt takes one a day      Facility-Administered Medications: None       Past Medical History:   Diagnosis Date   • ARF (acute renal failure) ([facility] [address] ) 2022   • Aspiration pneumonitis ([facility] [address] ) 10/1/2022   • Bacteremia due to methicillin susceptible Staphylococcus aureus (MSSA) 2022   • Chronic pain disorder    • Depression    • GERD (gastroesophageal reflux disease)    • History of electroconvulsive therapy    • Localized swelling of right upper extremity 2022   • Low back pain    • Self-injurious behavior    • Suicide attempt [facility])        Past Surgical History:   Procedure Laterality Date   •  SECTION     • COLONOSCOPY     • PANCREAS SURGERY      "pseudocysts" per patient's  Ricki Infante   • CO ESOPHAGOGASTRODUODENOSCOPY TRANSORAL DIAGNOSTIC N/A 4/10/2018    Procedure: EGD AND COLONOSCOPY;  Surgeon: Jane Bloch, MD;  [place] [facility] GI LAB; Service: Gastroenterology       Family History   Problem Relation Age of Onset   • Arthritis Mother    • Coronary artery disease Mother         MI in her 63's   • Parkinsonism Father         with falls and SDH   • Parkinsonism Paternal Grandmother    • Coronary artery disease Paternal Grandfather    • Parkinsonism Paternal Aunt    • Colon cancer Family    • Depression Neg Hx      I have reviewed and agree with the history as documented      E-Cigarette/Vaping   • E-Cigarette Use Never User      E-Cigarette/Vaping Substances   • Nicotine No    • THC No    • CBD No    • Flavoring No    • Other No    • Unknown No      Social History     Tobacco Use   • Smoking status: Every Day     Packs/day: 0 50     Years: 35 00     Pack years: 17 50     Types: Cigarettes   • Smokeless tobacco: Never   Vaping Use   • Vaping Use: Never used   Substance Use Topics   • Alcohol use: Not Currently     Comment: "occasional glass of wine"   • Drug use: Yes     Types: Marijuana, Cocaine     Comment: medical, MARIJUANA        Review of Systems   Unable to perform ROS: Mental status change       Physical Exam  ED Triage Vitals   Temperature Pulse Respirations Blood Pressure SpO2   02/08/23 1554 02/08/23 1547 02/08/23 1547 02/08/23 1547 02/08/23 1547   (!) 96 1 °F (35 6 °C) 102 18 (!) 69/46 (!) 87 %      Temp Source Heart Rate Source Patient Position - Orthostatic VS BP Location FiO2 (%)   02/08/23 1709 02/08/23 1547 02/08/23 1547 02/08/23 1547 02/08/23 1945   Tymp Core Monitor Sitting Right arm 70      Pain Score       02/09/23 0000       No Pain             Orthostatic Vital Signs  Vitals:    02/09/23 0700 02/09/23 0800 02/09/23 0900 02/09/23 1000   BP: 169/91 167/95 (!) 168/110 166/98   Pulse: 94 92 100 102   Patient Position - Orthostatic VS:           Physical Exam  Vitals and nursing note reviewed  Constitutional:       General: She is in acute distress  Appearance: She is well-developed  Comments: Lethargic, partially opens eyes to sternal rub  HENT:      Head: Normocephalic and atraumatic  Right Ear: Tympanic membrane normal       Left Ear: Tympanic membrane normal    Eyes:      Conjunctiva/sclera: Conjunctivae normal       Comments: Pupils 3mm and sluggishly reactive  Cardiovascular:      Rate and Rhythm: Normal rate and regular rhythm  Heart sounds: No murmur heard  Pulmonary:      Breath sounds: Normal breath sounds  Comments: Agonal breathing, requiring 15L NRB  Abdominal:      Palpations: Abdomen is soft  Tenderness: There is no abdominal tenderness  Musculoskeletal:         General: No swelling  Cervical back: Neck supple     Skin:     General: Skin is warm and dry  Capillary Refill: Capillary refill takes less than 2 seconds  Comments: No external signs of injury      Psychiatric:      Comments: ABENA         ED Medications  Medications   enoxaparin (LOVENOX) subcutaneous injection 40 mg ( Subcutaneous Dose Auto Held 2/11/23 0900)   pantoprazole (PROTONIX) EC tablet 40 mg ( Oral Dose Auto Held 2/12/23 0600)   multivitamin-minerals (CENTRUM) tablet 1 tablet ( Oral Dose Auto Held 2/12/23 0900)   thiamine tablet 100 mg ( Oral Dose Auto Held 3/81/39 4662)   folic acid (FOLVITE) tablet 1 mg ( Oral Dose Auto Held 2/12/23 0900)   gabapentin (NEURONTIN) capsule 300 mg ( Oral Dose Auto Held 2/12/23 2100)   ALPRAZolam (XANAX) tablet 0 5 mg ( Oral MAR Hold 2/9/23 1644)   venlafaxine (EFFEXOR-XR) 24 hr capsule 187 5 mg ( Oral Dose Auto Held 2/12/23 0900)   oxyCODONE (ROXICODONE) IR tablet 5 mg ( Oral MAR Hold 2/9/23 1644)   norepinephrine (LEVOPHED) 1 mg/mL injection **ADS Override Pull** (4 mg Intravenous (Continuous Infusion) Given 2/8/23 1600)   naloxone (NARCAN) 2 MG/2ML injection **ADS Override Pull** (2 mg  Given 2/8/23 1600)   hydrocortisone (Solu-CORTEF) injection 100 mg (100 mg Intravenous Given 2/8/23 1625)   dextrose 50 % IV solution 25 mL (25 mL Intravenous Given 2/8/23 1600)   sodium chloride 0 9 % bolus 1,000 mL (0 mL Intravenous Stopped 2/8/23 1647)   iohexol (OMNIPAQUE) 350 MG/ML injection (SINGLE-DOSE) 85 mL (85 mL Intravenous Given 2/8/23 1708)   Ketamine HCl 100 mg (100 mg Intravenous Given 2/8/23 1750)   Succinylcholine Chloride 100 mg/5 mL syringe 100 mg (100 mg Intravenous Given 2/8/23 1750)   vecuronium (NORCURON) injection 10 mg (10 mg Intravenous Given by Other 2/8/23 1828)   traMADol (ULTRAM) tablet 50 mg (50 mg Oral Given 2/9/23 0922)   magnesium sulfate 2 g/50 mL IVPB (premix) 2 g (2 g Intravenous New Bag 2/9/23 6890)       Diagnostic Studies  Results Reviewed     Procedure Component Value Units Date/Time    Fingerstick Glucose (POCT) [[id number]]  (Abnormal) Collected: 02/08/23 1544    Lab Status: Final result Updated: 02/09/23 0759     POC Glucose 55 mg/dl     Blood culture #1 [[id number]] Collected: 02/08/23 1618    Lab Status: Preliminary result Specimen: Blood from Arm, Right Updated: 02/09/23 0014     Blood Culture Received in Microbiology Lab  Culture in Progress  Blood culture #2 [[id number]] Collected: 02/08/23 1622    Lab Status: Preliminary result Specimen: Blood from Arm, Left Updated: 02/09/23 0014     Blood Culture Received in Microbiology Lab  Culture in Progress  HS Troponin I 4hr [[id number]]  (Normal) Collected: 02/08/23 2102    Lab Status: Final result Specimen: Blood from Arm, Left Updated: 02/08/23 2156     hs TnI 4hr 5 ng/L      Delta 4hr hsTnI 2 ng/L     HS Troponin I 2hr [[id number]]  (Normal) Collected: 02/08/23 1755    Lab Status: Final result Specimen: Blood from Arm, Right Updated: 02/08/23 1834     hs TnI 2hr 4 ng/L      Delta 2hr hsTnI 1 ng/L     FLU/RSV/COVID - if FLU/RSV clinically relevant [[id number]]  (Normal) Collected: 02/08/23 1609    Lab Status: Final result Specimen: Nares from Nose Updated: 02/08/23 1804     SARS-CoV-2 Negative     INFLUENZA A PCR Negative     INFLUENZA B PCR Negative     RSV PCR Negative    Narrative:      FOR PEDIATRIC PATIENTS - copy/paste COVID Guidelines URL to browser: [email or web address]    SARS-CoV-2 assay is a Nucleic Acid Amplification assay intended for the  qualitative detection of nucleic acid from SARS-CoV-2 in nasopharyngeal  swabs  Results are for the presumptive identification of SARS-CoV-2 RNA  Positive results are indicative of infection with SARS-CoV-2, the virus  causing COVID-19, but do not rule out bacterial infection or co-infection  with other viruses  Laboratories within the [place] and its  territories are required to report all positive results to the appropriate  public health authorities   Negative results do not preclude SARS-CoV-2  infection and should not be used as the sole basis for treatment or other  patient management decisions  Negative results must be combined with  clinical observations, patient history, and epidemiological information  This test has not been FDA cleared or approved  This test has been authorized by FDA under an Emergency Use Authorization  (EUA)  This test is only authorized for the duration of time the  declaration that circumstances exist justifying the authorization of the  emergency use of an in vitro diagnostic tests for detection of SARS-CoV-2  virus and/or diagnosis of COVID-19 infection under section 564(b)(1) of  the Act, 21 U  S C  724FPO-0(C)(5), unless the authorization is terminated  or revoked sooner  The test has been validated but independent review by FDA  and CLIA is pending  Test performed using [employer] GeneXpert: This RT-PCR assay targets N2,  a region unique to SARS-CoV-2  A conserved region in the E-gene was chosen  for pan-Sarbecovirus detection which includes SARS-CoV-2  According to CMS-2020-01-R, this platform meets the definition of high-throughput technology  Rapid drug screen, urine [[id number]]  (Normal) Collected: 02/08/23 1639    Lab Status: Final result Specimen: Urine, Clean Catch Updated: 02/08/23 1720     Amph/Meth UR Negative     Barbiturate Ur Negative     Benzodiazepine Urine Negative     Cocaine Urine Negative     Methadone Urine Negative     Opiate Urine Negative     PCP Ur Negative     THC Urine Negative     Oxycodone Urine Negative    Narrative:      FOR MEDICAL PURPOSES ONLY  IF CONFIRMATION NEEDED PLEASE CONTACT THE LAB WITHIN 5 DAYS      Drug Screen Cutoff Levels:  AMPHETAMINE/METHAMPHETAMINES  1000 ng/mL  BARBITURATES     200 ng/mL  BENZODIAZEPINES     200 ng/mL  COCAINE      300 ng/mL  METHADONE      300 ng/mL  OPIATES      300 ng/mL  PHENCYCLIDINE     25 ng/mL  THC       50 ng/mL  OXYCODONE      100 ng/mL    Urine Microscopic [[id number]]  (Abnormal) Collected: 02/08/23 1640    Lab Status: Final result Specimen: Urine, Clean Catch Updated: 02/08/23 1715     RBC, UA 1-2 /hpf      WBC, UA 10-20 /hpf      Epithelial Cells Occasional /hpf      Bacteria, UA Occasional /hpf     Urine culture [[id number]] Collected: 02/08/23 1640    Lab Status: In process Specimen: Urine, Clean Catch Updated: 02/08/23 1715    Lactic acid, plasma [[id number]]  (Normal) Collected: 02/08/23 1550    Lab Status: Final result Specimen: Blood from Arm, Left Updated: 02/08/23 1714     LACTIC ACID 0 9 mmol/L     Narrative:      Result may be elevated if tourniquet was used during collection  Acetaminophen level-If concentration is detectable, please discuss with medical  on call   [[id number]]  (Abnormal) Collected: 02/08/23 1550    Lab Status: Final result Specimen: Blood from Arm, Left Updated: 02/08/23 1714     Acetaminophen Level 2 ug/mL     Comprehensive metabolic panel [[id number]]  (Abnormal) Collected: 02/08/23 1550    Lab Status: Final result Specimen: Blood from Arm, Left Updated: 02/08/23 1714     Sodium 139 mmol/L      Potassium 3 9 mmol/L      Chloride 113 mmol/L      CO2 22 mmol/L      ANION GAP 4 mmol/L      BUN 8 mg/dL      Creatinine 0 69 mg/dL      Glucose 192 mg/dL      Calcium 7 5 mg/dL      Corrected Calcium 8 6 mg/dL      AST 74 U/L       U/L      Alkaline Phosphatase 133 U/L      Total Protein 4 7 g/dL      Albumin 2 6 g/dL      Total Bilirubin 0 40 mg/dL      eGFR 99 ml/min/1 73sq m     Narrative:      [facility] guidelines for Chronic Kidney Disease (CKD):   •  Stage 1 with normal or high GFR (GFR > 90 mL/min/1 73 square meters)  •  Stage 2 Mild CKD (GFR = 60-89 mL/min/1 73 square meters)  •  Stage 3A Moderate CKD (GFR = 45-59 mL/min/1 73 square meters)  •  Stage 3B Moderate CKD (GFR = 30-44 mL/min/1 73 square meters)  •  Stage 4 Severe CKD (GFR = 15-29 mL/min/1 73 square meters)  •  Stage 5 End Stage CKD (GFR <15 mL/min/1 73 square meters)  Note: GFR calculation is accurate only with a steady state creatinine    Salicylate level [[id number]]  (Abnormal) Collected: 02/08/23 1550    Lab Status: Final result Specimen: Blood from Arm, Left Updated: 80/41/03 7728     Salicylate Lvl <3 mg/dL     Ethanol [[id number]]  (Abnormal) Collected: 02/08/23 1550    Lab Status: Final result Specimen: Blood from Arm, Left Updated: 02/08/23 1709     Ethanol Lvl 144 mg/dL     UA w Reflex to Microscopic w Reflex to Culture [[id number]]  (Abnormal) Collected: 02/08/23 1640    Lab Status: Final result Specimen: Urine, Clean Catch Updated: 02/08/23 1706     Color, UA Light Yellow     Clarity, UA Turbid     Specific [place], UA 1 002     pH, UA 5 5     Leukocytes, UA Large     Nitrite, UA Negative     Protein, UA Negative mg/dl      Glucose, UA 70 (7/100%) mg/dl      Ketones, UA Negative mg/dl      Urobilinogen, UA <2 0 mg/dl      Bilirubin, UA Negative     Occult Blood, UA Negative    Ammonia [[id number]]  (Abnormal) Collected: 02/08/23 1550    Lab Status: Final result Specimen: Blood from Arm, Left Updated: 02/08/23 1655     Ammonia 104 umol/L     HS Troponin 0hr (reflex protocol) [[id number]]  (Normal) Collected: 02/08/23 1550    Lab Status: Final result Specimen: Blood from Arm, Left Updated: 02/08/23 1654     hs TnI 0hr 3 ng/L     D-dimer, quantitative [[id number]]  (Abnormal) Collected: 02/08/23 1550    Lab Status: Final result Specimen: Blood from Arm, Left Updated: 02/08/23 1638     D-Dimer, Quant 1 02 ug/ml FEU     Narrative: In the evaluation for possible pulmonary embolism, in the appropriate (Well's Score of 4 or less) patient, the age adjusted d-dimer cutoff for this patient can be calculated as:    Age x 0 01 (in ug/mL) for Age-adjusted D-dimer exclusion threshold for a patient over 50 years      Blood gas, venous [[id number]]  (Abnormal) Collected: 02/08/23 1550    Lab Status: Final result Specimen: Blood from Arm, Left Updated: 02/08/23 1637     pH, Kiko 7 221     pCO2, Kiko 53 6 mm Hg      pO2, Kiko 196 5 mm Hg      HCO3, Kiko 21 5 mmol/L      Base Excess, Kiko -6 4 mmol/L      O2 Content, Kiko 15 2 ml/dL      O2 HGB, VENOUS 91 2 %     CBC and differential [[id number]]  (Abnormal) Collected: 02/08/23 1550    Lab Status: Final result Specimen: Blood from Arm, Left Updated: 02/08/23 1617     WBC 10 12 Thousand/uL      RBC 4 12 Million/uL      Hemoglobin 12 4 g/dL      Hematocrit 39 2 %      MCV 95 fL      MCH 30 1 pg      MCHC 31 6 g/dL      RDW 16 4 %      MPV 9 3 fL      Platelets 404 Thousands/uL      nRBC 0 /100 WBCs      Neutrophils Relative 67 %      Immat GRANS % 0 %      Lymphocytes Relative 24 %      Monocytes Relative 7 %      Eosinophils Relative 1 %      Basophils Relative 1 %      Neutrophils Absolute 6 77 Thousands/µL      Immature Grans Absolute 0 03 Thousand/uL      Lymphocytes Absolute 2 46 Thousands/µL      Monocytes Absolute 0 75 Thousand/µL      Eosinophils Absolute 0 05 Thousand/µL      Basophils Absolute 0 06 Thousands/µL     Fingerstick Glucose (POCT) [[id number]]  (Abnormal) Collected: 02/08/23 1609    Lab Status: Final result Updated: 02/08/23 1616     POC Glucose 191 mg/dl     POCT Blood Gas (CG8+) [[id number]]  (Abnormal) Collected: 02/08/23 1556    Lab Status: Final result Specimen: Venous Updated: 02/08/23 1558     ph, Kiko ISTAT 7 382     pCO2, Kiko i-STAT 39 0 mm HG      pO2, Kiko i-STAT 48 0 mm HG      BE, i-STAT -2 mmol/L      HCO3, Kiko i-STAT 23 1 mmol/L      CO2, i-STAT 24 mmol/L      O2 Sat, i-STAT 82 %      SODIUM, I-STAT 137 mmol/l      Potassium, i-STAT 3 8 mmol/L      Calcium, Ionized i-STAT 1 27 mmol/L      Hct, i-STAT 40 %      Hgb, i-STAT 13 6 g/dl      Glucose, i-STAT 66 mg/dl      Specimen Type VENOUS                 XR abdomen 1 view kub   Final Result by Naye Chavez MD (02/09 1022)      Nasogastric tube tip in the stomach               Workstation performed: [id number]         XR chest 1 view portable   Final Result by Hermelinda Sims MD (02/09 1020)      Endotracheal tube tip is 4 4 cm above the onur                  Workstation performed: [id number]         CTA ED chest PE Study   Final Result by Chika Cummins MD (02/08 1747)      No pulmonary embolism  Dense bibasilar consolidation could represent pneumonia or atelectasis  Bright intraluminal contrast in the stomach is noted on image 2/240  If the patient did not ingest oral contrast, the possibility of a gastric bleed is raised  Correlate clinically  Workstation performed: [id number]         CT head without contrast   Final Result by Chika Cummins MD (02/08 1741)      Periventricular white matter hypodensity is similar to prior MRI and has been attributed to possible toxic encephalopathy  No interval change or acute abnormality detected  Workstation performed: [id number]               Procedures  Intubation    Date/Time: 2/8/2023 6:31 PM  Performed by: Bereket Morrison MD  Authorized by: Bereket Morrison MD     Patient location:  ED  Consent:     Consent obtained:  Emergent situation and verbal    Consent given by:  Spouse    Risks discussed:  Hypoxia, death, laryngeal injury and brain injury    Alternatives discussed:  No treatment, delayed treatment and observation  Universal protocol:     Procedure explained and questions answered to patient or proxy's satisfaction: yes      Relevant documents present and verified: yes      Patient identity confirmed:   Anonymous protocol, patient vented/unresponsive  Pre-procedure details:     Patient status:  Altered mental status    Mallampati score:  1    Pretreatment medications:  Ketamine    Paralytics:  Succinylcholine  Indications:     Indications for intubation: airway protection, hypoxemia and hypercapnia    Procedure details:     Preoxygenation:  Nonrebreather mask    CPR in progress: no      Intubation method:  Oral    Oral intubation technique: Glidescope    Laryngoscope blade: Mac 4    Tube size (mm):  7 5    Tube type:  Cuffed    Number of attempts:  1    Cricoid pressure: no      Tube visualized through cords: yes    Placement assessment:     ETT to lip:  23    Tube secured with:  ETT flores    Breath sounds:  Equal    Placement verification: chest rise, CXR verification and ETCO2 detector      CXR findings:  ETT in proper place  Post-procedure details:     Patient tolerance of procedure: Tolerated well, no immediate complications          ED Course                                       Medical Decision Making  45 y o F presenting to the ED due to AMS of unclear etiology  Patient has a history of suicide attempt and per  was reluctant to go to the appointment today so there is possible component of polypharmacy/overdose  She has no signs of external injury to head but it is possible that she hit it and he did not notice so will obtain [facility] when able  Fingerstick glucose showing hypoglycemia so she was given 1 amp of dextrose with improvement, but no change in mental status  She was bradycardic initially with SBP in 70s so 2 large bore IVs were placed and 2L Isolyte was bolused  Her pressure did not improve with this so she was started on levophed at 5, which was eventually titrated to 15   noted a possible period of steroid use this month so she was given stress dose steroids  She had worsening respiratory status but was temporarily maintaining her airway so she was taken to the CT scan for [facility] and also obtained CTPE due to elevated d-dimer  Her respiratory status continued to decompensate so she was intubated and sedated with fentanyl  Her blood pressure slowly improved and the pressors were weaned off  Labs showed a hyperammonemia but this does not fit with the initial hypoxia and hypotension  She was admitted to the ICU for further care       Acute respiratory failure with hypoxia ([address] ): acute illness or injury  Altered mental status: acute illness or injury  Hyperammonemia ([facility] [address] ): acute illness or injury  Amount and/or Complexity of Data Reviewed  Labs: ordered  Radiology: ordered  Risk  Prescription drug management  Decision regarding hospitalization  Disposition  Final diagnoses: Altered mental status   Hyperammonemia ([facility] [address] )   Acute respiratory failure with hypoxia ([facility] [address] )     Time reflects when diagnosis was documented in both MDM as applicable and the Disposition within this note     Time User Action Codes Description Comment    2/8/2023  6:56 PM Lashonda Flores Add [R41 82] Altered mental status     2/8/2023  6:56 PM Yokubaitis, Nishant Add [E72 20] Hyperammonemia ([facility] [address] )     2/8/2023  6:57 PM Lashonda Flores Add [J96 01] Acute respiratory failure with hypoxia [facility])       ED Disposition     ED Disposition   Admit    Condition   Stable    Date/Time   Wed Feb 8, 2023  6:56 PM    Comment   Case was discussed with Dr Mamta Barrett  and the patient's admission status was agreed to be Admission Status: inpatient status to the service of Dr Mamta Barrett              Follow-up Information    None         Current Discharge Medication List      CONTINUE these medications which have NOT CHANGED    Details   ALPRAZolam (XANAX) 0 5 mg tablet Take 1 tablet (0 5 mg total) by mouth daily at bedtime as needed for anxiety or sleep for up to 15 days  Qty: 15 tablet, Refills: 0    Associated Diagnoses: Severe episode of recurrent major depressive disorder, without psychotic features (HCC)      Diclofenac Sodium (VOLTAREN) 1 % Apply 2 g topically 4 (four) times a day  Qty: 150 g, Refills: 2    Associated Diagnoses: Right foot pain      gabapentin (NEURONTIN) 300 mg capsule Take 1 capsule (300 mg total) by mouth 4 (four) times a day  Qty: 360 capsule, Refills: 1    Associated Diagnoses: Ambulatory dysfunction; DDD (degenerative disc disease), lumbosacral      lidocaine (LMX) 4 % cream Apply topically as needed for moderate pain  Qty: 30 g, Refills: 0 Associated Diagnoses: Chronic foot pain, right      meloxicam (MOBIC) 15 mg tablet TAKE 1 TABLET BY MOUTH ONCE DAILY  Qty: 30 tablet, Refills: 0    Associated Diagnoses: Right foot pain      nicotine (NICODERM CQ) 14 mg/24hr TD 24 hr patch Place 1 patch on the skin daily Do not start before November 8, 2022  Qty: 28 patch, Refills: 0    Associated Diagnoses: Tobacco abuse      Nucynta 50 MG tablet       omeprazole (PriLOSEC) 40 MG capsule Take 1 capsule (40 mg total) by mouth daily  Qty: 90 capsule, Refills: 1    Associated Diagnoses: Esophagitis, reflux      ondansetron (Zofran ODT) 8 mg disintegrating tablet Take 1 tablet (8 mg total) by mouth every 8 (eight) hours as needed for nausea or vomiting  Qty: 20 tablet, Refills: 0    Associated Diagnoses: Nausea and vomiting, unspecified vomiting type      saccharomyces boulardii (FLORASTOR) 250 mg capsule Take 1 capsule (250 mg total) by mouth 2 (two) times a day  Qty: 180 capsule, Refills: 3    Associated Diagnoses: Uncomplicated opioid dependence ([address] ); Bacteremia due to methicillin susceptible Staphylococcus aureus (MSSA)      traMADol (ULTRAM) 50 mg tablet Take 50 mg by mouth 3 (three) times a day as needed      venlafaxine (EFFEXOR-XR) 150 mg 24 hr capsule            No discharge procedures on file  PDMP Review       Value Time User    PDMP Reviewed  Yes 2/3/2023  9:46 AM Swathi Lam MD           ED Provider  Attending physically available and evaluated Mona Rubinstein  [facility] managed the patient along with the ED Attending      Electronically Signed by         Guillermina Wade MD  02/09/23 9257

## 2023-02-08 NOTE — ED TRIAGE NOTES
[facility]  While in office building, pt with unsteady gait, witness fall by   Pt with change in MS  Pt transported by staff in [facility] to ER  Pt BS 55mg/dl    1/2 amp D5 given upon results

## 2023-02-08 NOTE — ED PROCEDURE NOTE
Procedure  POC Cardiac US    Date/Time: 2/8/2023 4:16 PM  Performed by: Cathy Tuttle MD  Authorized by: Cathy Tuttle MD     Patient location:  ED  Other Assisting Provider: Yes (comment) (Dr Edson Lagunas)    Procedure details:     Exam Type:  Diagnostic    Indications: hypotension and suspected volume depletion      Assessment / Evaluation for: cardiac function, pericardial effusion and intravascular volume status      Image quality: limited diagnostic      Image availability:  Images available in PACS  Patient Details:     Cardiac Rhythm:  Regular    Medications: norepinephrine infusion      Mechanical ventilation: No    Cardiac findings:     Echo technique: color flow Doppler      Views obtained: parasternal long axis, parasternal short axis and subcostal      Pericardial effusion: absent      Tamponade physiology: absent      Wall motion: normal      LV systolic function: normal      RV dilation: none    IVC findings:     IVC Size: dilated      IVC Inspiratory Collapse: absent    Interpretation:     Fluid Status:  Euvolemic  POC FAST US    Date/Time: 2/8/2023 4:17 PM  Performed by: Cathy Tuttle MD  Authorized by: Cathy Tuttle MD     Patient location:  ED  Procedure details:     Exam Type:  Diagnostic    Indications: hypotension      Assess for:  Intra-abdominal fluid    Technique: FAST      Views obtained:  Heart - Pericardial sac, RUQ - Gan's Pouch, LUQ - Splenorenal space and Suprapubic - Pouch of Yovanny    Image quality: diagnostic      Image availability:  Images available in PACS  FAST Findings:     RUQ (Hepatorenal) free fluid: absent      LUQ (Splenorenal) free fluid: absent      Suprapubic free fluid: absent      Cardiac wall motion: identified      Pericardial effusion: absent    Interpretation:     Impressions: negative    POC AAA US    Date/Time: 2/8/2023 4:18 PM  Performed by: Cathy Tuttle MD  Authorized by: Cathy Tuttle MD     Patient location:  ED  Performing Provider:  Resident  Procedure performed by consultant: Dr Chayo Morris      Procedure details:     Exam Type:  Diagnostic    Indications: hypotension      Views Obtained:  Transverse proximal, transverse mid view, transverse distal view and sagittal (longitudinal) view    Image quality: diagnostic    Findings:     Abdominal Aorta Findings: normal      Maximal Aorta diameter (cm):  2 3    Intra-abdominal fluid: not identified    Interpretation:     Aortic ultrasound impression: aorta normal                       Efrain Molina MD  02/08/23 5302

## 2023-02-08 NOTE — PROCEDURES
Patient came to the EMG lab for evaluation of lower extremity pain and paresthesias  She had a fall in the parking lot, did not hit her head  However, on arrival, patient was not awake, would open eyes to verbal stimuli, however did not follow any commands  According to , patient has been having these intermittent episodes of loss of consciousness, she was sent to emergency room  She was normal at home when they left to come here, and was fine up until she had the fall in the parking lot  We will reschedule her EMG when she is discharged from the hospital and medically stable

## 2023-02-08 NOTE — ED NOTES
Pt transported to CT on monitor with RN and Dr  Laymon Call at this time        Yokasta Randle RN  02/08/23 4218

## 2023-02-08 NOTE — RESPIRATORY THERAPY NOTE
RT Ventilator Management Note  Patricia Youngblood 45 y o  female MRN: [id number]  Unit/Bed#: ED 06 Encounter: [id number]      Daily Screen         2/8/2023  8925             Patient safety screen outcome[de-identified] Failed (P)               Physical Exam:   Assessment Type: (P) Assess only  General Appearance: (P) Sedated  Respiratory Pattern: (P) Assisted  Chest Assessment: (P) Chest expansion symmetrical  Bilateral Breath Sounds: (P) Diminished      Resp Comments: (P) Pt unresponsive  Called for intubation  Pt intubated by ER resident  +ETCO2  Bilateral BS  Pt placed on vent  Pt has HX COPD  Will continue to monitor pt

## 2023-02-08 NOTE — ED ATTENDING ATTESTATION
2/8/2023  Nadya MEEK MD, saw and evaluated the patient  I have discussed the patient with the resident and agree with the resident's findings, Plan of Care, and MDM as documented in the resident's note, except where noted  All available labs and Radiology studies were reviewed  I was present for key portions of any procedure(s) performed by the resident and I was immediately available to provide assistance  At this point I agree with the current assessment done in the Emergency Department  I have conducted an independent evaluation of this patient a history and physical is as follows:    46 yo female with a history of major depressive disorder with previous suicide attempts (via ingestion), anxiety, COPD, generalized anxiety disorder, ambulatory dysfunction, chronic low back pain, and GERD brought to the ED as a medical emergency  The patient was brought to the hospital by her  for a scheduled EMG  He says she was angry en route, arguing with him about the upcoming test  When she went to get out of the car she slowly fell to her hands and knees then laid on the ground  No head strike   had difficulty getting her up so he called for help and hospital staff eventually brought her to the ED  On arrival she is hypotensive, hypoxic, and minimally responsive  No chest pain, shortness of breath, dizziness, or lightheadedness prior to the fall  No nausea or vomiting  (+) Recent course of steroids for a gout flare per   No recent illnesses per   No other significant history  ROS: per resident physician note    Gen: minimally responsive, critically ill-appearing, nonverbal  HEENT: PERRL, EOMI  Neck: supple  CV: (+) tachycardic  Lungs: CTA B/L  Abdomen: soft, NT/ND  Ext: no swelling or deformity  Neuro: limited exam due to poor mental status, only responds to painful stimulus  Skin: no rash    ED Course  The patient is minimally responsive, hypotensive, and hypoxic   Unclear etiology of sudden mental status change  Intentional overdose vs ACS vs PE vs ICH vs adrenal insufficiency vs sepsis vs CVA? Will check EKG, CXR, head CT, basic labs, troponin, d dimer, VBG, lactate, UA, and UDS  IVFs, pressors, dextrose, and stress-dosed steroids ordered  Will continue to monitor closely in the ED  The patient will likely require intubation, central venous access, and admission to the ICU     17:55 Patient mental status continues to decline  She is now exhibiting sonorous respirations  Decision was made to intubate for airway protection   is agreeable to this plan  The patient successfully intubated using RSI and fiberoptic visualization on first attempt  Please see procedure note      Critical Care Time  CriticalCare Time  Performed by: John Bishop MD  Authorized by: John Bishop MD     Critical care provider statement:     Critical care time (minutes):  90    Critical care start time:  2/8/2023 4:00 PM    Critical care end time:  2/8/2023 5:30 PM    Critical care time was exclusive of:  Separately billable procedures and treating other patients and teaching time    Critical care was necessary to treat or prevent imminent or life-threatening deterioration of the following conditions:  CNS failure or compromise, respiratory failure, shock and toxidrome    Critical care was time spent personally by me on the following activities:  Obtaining history from patient or surrogate, development of treatment plan with patient or surrogate, discussions with consultants, discussions with primary provider, evaluation of patient's response to treatment, examination of patient, interpretation of cardiac output measurements, ordering and performing treatments and interventions, ordering and review of laboratory studies, ordering and review of radiographic studies, re-evaluation of patient's condition, review of old charts and ventilator management    I assumed direction of critical care for this patient from another provider in my specialty: no

## 2023-02-08 NOTE — ED NOTES
Pt attempting to sit up in bed, moving arms, biting ETT  Dr Ángel Beckford aware, will order vecuronium        Shauna Wright RN  02/08/23 7172

## 2023-02-08 NOTE — ED NOTES
2L NSS infusing at thsi time  Dr Humera Covarrubias to bedside        Raquel Bhatia, RN  02/08/23 9778

## 2023-02-09 LAB
ALBUMIN SERPL BCP-MCNC: 3.1 G/DL (ref 3.5–5)
ALP SERPL-CCNC: 162 U/L (ref 46–116)
ALT SERPL W P-5'-P-CCNC: 330 U/L (ref 12–78)
AMMONIA PLAS-SCNC: 35 UMOL/L (ref 11–35)
ANION GAP SERPL CALCULATED.3IONS-SCNC: 5 MMOL/L (ref 4–13)
AST SERPL W P-5'-P-CCNC: 58 U/L (ref 5–45)
ATRIAL RATE: 99 BPM
BASOPHILS # BLD AUTO: 0.01 THOUSANDS/ÂΜL (ref 0–0.1)
BASOPHILS NFR BLD AUTO: 0 % (ref 0–1)
BILIRUB SERPL-MCNC: 0.4 MG/DL (ref 0.2–1)
BUN SERPL-MCNC: 7 MG/DL (ref 5–25)
CALCIUM ALBUM COR SERPL-MCNC: 9.3 MG/DL (ref 8.3–10.1)
CALCIUM SERPL-MCNC: 8.6 MG/DL (ref 8.3–10.1)
CHLORIDE SERPL-SCNC: 110 MMOL/L (ref 96–108)
CO2 SERPL-SCNC: 24 MMOL/L (ref 21–32)
CREAT SERPL-MCNC: 0.81 MG/DL (ref 0.6–1.3)
EOSINOPHIL # BLD AUTO: 0.01 THOUSAND/ÂΜL (ref 0–0.61)
EOSINOPHIL NFR BLD AUTO: 0 % (ref 0–6)
ERYTHROCYTE [DISTWIDTH] IN BLOOD BY AUTOMATED COUNT: 16.3 % (ref 11.6–15.1)
GFR SERPL CREATININE-BSD FRML MDRD: 82 ML/MIN/1.73SQ M
GLUCOSE SERPL-MCNC: 128 MG/DL (ref 65–140)
GLUCOSE SERPL-MCNC: 147 MG/DL (ref 65–140)
GLUCOSE SERPL-MCNC: 252 MG/DL (ref 65–140)
GLUCOSE SERPL-MCNC: 55 MG/DL (ref 65–140)
HAV IGM SER QL: NORMAL
HBV CORE IGM SER QL: NORMAL
HBV SURFACE AG SER QL: NORMAL
HCT VFR BLD AUTO: 37.7 % (ref 34.8–46.1)
HCT VFR BLD AUTO: 41.5 % (ref 34.8–46.1)
HCV AB SER QL: NORMAL
HGB BLD-MCNC: 11.8 G/DL (ref 11.5–15.4)
HGB BLD-MCNC: 12.9 G/DL (ref 11.5–15.4)
IMM GRANULOCYTES # BLD AUTO: 0.03 THOUSAND/UL (ref 0–0.2)
IMM GRANULOCYTES NFR BLD AUTO: 0 % (ref 0–2)
LYMPHOCYTES # BLD AUTO: 1.11 THOUSANDS/ÂΜL (ref 0.6–4.47)
LYMPHOCYTES NFR BLD AUTO: 17 % (ref 14–44)
MAGNESIUM SERPL-MCNC: 1.5 MG/DL (ref 1.6–2.6)
MCH RBC QN AUTO: 30.4 PG (ref 26.8–34.3)
MCHC RBC AUTO-ENTMCNC: 31.1 G/DL (ref 31.4–37.4)
MCV RBC AUTO: 98 FL (ref 82–98)
MONOCYTES # BLD AUTO: 0.54 THOUSAND/ÂΜL (ref 0.17–1.22)
MONOCYTES NFR BLD AUTO: 8 % (ref 4–12)
NEUTROPHILS # BLD AUTO: 4.97 THOUSANDS/ÂΜL (ref 1.85–7.62)
NEUTS SEG NFR BLD AUTO: 75 % (ref 43–75)
NRBC BLD AUTO-RTO: 0 /100 WBCS
P AXIS: 72 DEGREES
PHOSPHATE SERPL-MCNC: 3.6 MG/DL (ref 2.7–4.5)
PLATELET # BLD AUTO: 265 THOUSANDS/UL (ref 149–390)
PMV BLD AUTO: 9.3 FL (ref 8.9–12.7)
POTASSIUM SERPL-SCNC: 4.3 MMOL/L (ref 3.5–5.3)
PR INTERVAL: 130 MS
PROT SERPL-MCNC: 6 G/DL (ref 6.4–8.4)
QRS AXIS: 73 DEGREES
QRSD INTERVAL: 86 MS
QT INTERVAL: 380 MS
QTC INTERVAL: 487 MS
RBC # BLD AUTO: 4.25 MILLION/UL (ref 3.81–5.12)
SODIUM SERPL-SCNC: 139 MMOL/L (ref 135–147)
T WAVE AXIS: 65 DEGREES
VENTRICULAR RATE: 99 BPM
WBC # BLD AUTO: 6.67 THOUSAND/UL (ref 4.31–10.16)

## 2023-02-09 RX ORDER — OXYCODONE HYDROCHLORIDE 5 MG/1
5 TABLET ORAL EVERY 6 HOURS PRN
Status: DISCONTINUED | OUTPATIENT
Start: 2023-02-09 | End: 2023-02-09

## 2023-02-09 RX ORDER — GABAPENTIN 300 MG/1
300 CAPSULE ORAL 3 TIMES DAILY
Status: DISCONTINUED | OUTPATIENT
Start: 2023-02-09 | End: 2023-02-10 | Stop reason: HOSPADM

## 2023-02-09 RX ORDER — ALPRAZOLAM 0.5 MG/1
0.5 TABLET ORAL DAILY PRN
Status: DISCONTINUED | OUTPATIENT
Start: 2023-02-09 | End: 2023-02-10 | Stop reason: HOSPADM

## 2023-02-09 RX ORDER — LANOLIN ALCOHOL/MO/W.PET/CERES
100 CREAM (GRAM) TOPICAL DAILY
Status: DISCONTINUED | OUTPATIENT
Start: 2023-02-09 | End: 2023-02-10 | Stop reason: HOSPADM

## 2023-02-09 RX ORDER — OXYCODONE HYDROCHLORIDE 5 MG/1
5 TABLET ORAL EVERY 4 HOURS PRN
Status: DISCONTINUED | OUTPATIENT
Start: 2023-02-09 | End: 2023-02-10 | Stop reason: HOSPADM

## 2023-02-09 RX ORDER — TRAMADOL HYDROCHLORIDE 50 MG/1
50 TABLET ORAL ONCE
Status: COMPLETED | OUTPATIENT
Start: 2023-02-09 | End: 2023-02-09

## 2023-02-09 RX ORDER — FOLIC ACID 1 MG/1
1 TABLET ORAL DAILY
Status: DISCONTINUED | OUTPATIENT
Start: 2023-02-09 | End: 2023-02-10 | Stop reason: HOSPADM

## 2023-02-09 RX ORDER — MAGNESIUM SULFATE HEPTAHYDRATE 40 MG/ML
2 INJECTION, SOLUTION INTRAVENOUS ONCE
Status: COMPLETED | OUTPATIENT
Start: 2023-02-09 | End: 2023-02-09

## 2023-02-09 RX ORDER — DEXTROSE, SODIUM CHLORIDE, SODIUM LACTATE, POTASSIUM CHLORIDE, AND CALCIUM CHLORIDE 5; .6; .31; .03; .02 G/100ML; G/100ML; G/100ML; G/100ML; G/100ML
75 INJECTION, SOLUTION INTRAVENOUS CONTINUOUS
Status: DISCONTINUED | OUTPATIENT
Start: 2023-02-09 | End: 2023-02-09

## 2023-02-09 RX ORDER — DEXTROSE AND SODIUM CHLORIDE 5; .45 G/100ML; G/100ML
75 INJECTION, SOLUTION INTRAVENOUS CONTINUOUS
Status: DISCONTINUED | OUTPATIENT
Start: 2023-02-09 | End: 2023-02-09

## 2023-02-09 RX ADMIN — MAGNESIUM SULFATE HEPTAHYDRATE 2 G: 40 INJECTION, SOLUTION INTRAVENOUS at 09:30

## 2023-02-09 RX ADMIN — OXYCODONE HYDROCHLORIDE 5 MG: 5 TABLET ORAL at 20:45

## 2023-02-09 RX ADMIN — OXYCODONE HYDROCHLORIDE 5 MG: 5 TABLET ORAL at 16:07

## 2023-02-09 RX ADMIN — Medication 1 TABLET: at 09:22

## 2023-02-09 RX ADMIN — ENOXAPARIN SODIUM 40 MG: 40 INJECTION SUBCUTANEOUS at 09:22

## 2023-02-09 RX ADMIN — FOLIC ACID 1 MG: 1 TABLET ORAL at 09:22

## 2023-02-09 RX ADMIN — PANTOPRAZOLE SODIUM 40 MG: 40 TABLET, DELAYED RELEASE ORAL at 05:16

## 2023-02-09 RX ADMIN — GABAPENTIN 300 MG: 300 CAPSULE ORAL at 09:22

## 2023-02-09 RX ADMIN — GABAPENTIN 300 MG: 300 CAPSULE ORAL at 20:44

## 2023-02-09 RX ADMIN — DEXTROSE, SODIUM CHLORIDE, SODIUM LACTATE, POTASSIUM CHLORIDE, AND CALCIUM CHLORIDE 75 ML/HR: 5; .6; .31; .03; .02 INJECTION, SOLUTION INTRAVENOUS at 03:42

## 2023-02-09 RX ADMIN — VENLAFAXINE HYDROCHLORIDE 187.5 MG: 150 CAPSULE, EXTENDED RELEASE ORAL at 11:54

## 2023-02-09 RX ADMIN — GABAPENTIN 300 MG: 300 CAPSULE ORAL at 15:36

## 2023-02-09 RX ADMIN — OXYCODONE HYDROCHLORIDE 5 MG: 5 TABLET ORAL at 11:54

## 2023-02-09 RX ADMIN — TRAMADOL HYDROCHLORIDE 50 MG: 50 TABLET, COATED ORAL at 09:22

## 2023-02-09 RX ADMIN — THIAMINE HCL TAB 100 MG 100 MG: 100 TAB at 09:22

## 2023-02-09 NOTE — UTILIZATION REVIEW
Initial Clinical Review    Admission: Date/Time/Statement:   Admission Orders (From admission, onward)     Ordered        02/08/23 1857  INPATIENT ADMISSION  Once                      Orders Placed This Encounter   Procedures   • INPATIENT ADMISSION     Standing Status:   Standing     Number of Occurrences:   1     Order Specific Question:   Level of Care     Answer:   Critical Care [15]     Order Specific Question:   Estimated length of stay     Answer:   More than 2 Midnights     Order Specific Question:   Certification     Answer:   I certify that inpatient services are medically necessary for this patient for a duration of greater than two midnights  See H&P and MD Progress Notes for additional information about the patient's course of treatment  ED Arrival Information     Expected   -    Arrival   2/8/2023 15:43    Acuity   Immediate            Means of arrival   Walk-In    Escorted by   Self    Service   Hospitalist    Admission type   Emergency            Arrival complaint   Medical problem           Chief Complaint   Patient presents with   • Fall     Witnessed, no head strike, change in MS       Initial Presentation: 45 y o  female with PMH of chronic pulmonary edema, chronic opioid use, COPD, HTN, and depression with history of suicide attempt presented to the ED from home as a walk in w/ AMS  Per pts , pt was in her normal state of health earlier today  Drank alcohol and took Xanax for fear of her scheduled EMG today being painful  She had an argument w/ her  on the way to her EMG, fell out of the car when thy arrived  No head strike  She became poorly responsive an dwas brought to the ED  In the ED, she was found hypotensive, hypoxic and minimally responsive  BD 55  BG on arrival was 55  She was stabilized with fluids, pressors, and IV glucose  On route to CT, she was intubated for airway protection secondary to worsening mental status   Liver enzymes elevated, ammonia 104, Coma panel +ethanol-144, D-dimer- 1 02  CT PE study neg for acute PE  There was dense bibasilar consolidation  CT head with evidence of possible toxic encephalopathy  UDS was negative  On exam, intubated, paralyzed, ill appearing  Admit as Inpatient for toxic metabolic encephalopathy, acute hypoxic resp failure, elevated liver enzymes, hyperammonemia, abnormal CT finding, prolonged QTc: Continue pressure support with goal to extubate  SpO2 goal>92%  Pain control and sedation with propofol and fentanyl  Avoid QT prolonging meds  Trend liver enzymes  Start Lactulose 20 g tid  Follow hgb  NPO  IVF  DVT ppx  Date: 02/09   Day 2:   Critical Care Notes: Pt's BP improved w/ IVFs  Successfully extubated overnight as she was following commands and taking adequate tidal volume  Remained sleepy but rousable overnight  Currently on RA  Stop Lactulose, ammonia improved  no over suspicion of GIB  Recheck Hgb q12hr for now  Cont Thiamine  folate  Restart Xanax, gabapentin, venlafaxine  Tramadol prn  Start diet  DVT ppx      ED Triage Vitals   Temperature Pulse Respirations Blood Pressure SpO2   02/08/23 1554 02/08/23 1547 02/08/23 1547 02/08/23 1547 02/08/23 1547   (!) 96 1 °F (35 6 °C) 102 18 (!) 69/46 (!) 87 %      Temp Source Heart Rate Source Patient Position - Orthostatic VS BP Location FiO2 (%)   02/08/23 1709 02/08/23 1547 02/08/23 1547 02/08/23 1547 02/08/23 1945   Tymp Core Monitor Sitting Right arm 70      Pain Score       02/09/23 0000       No Pain          Wt Readings from Last 1 Encounters:   02/08/23 58 kg (127 lb 13 9 oz)     Additional Vital Signs:   Date/Time Temp Pulse Resp BP MAP (mmHg) SpO2 FiO2 (%) O2 Flow Rate (L/min) O2 Device Patient Position - Orthostatic VS   02/09/23 1000 99 7 °F (37 6 °C) 102 26 Abnormal  166/98 130 98 % -- -- -- --   02/09/23 0920 99 3 °F (37 4 °C) -- -- -- -- -- -- -- -- --   02/09/23 0912 99 3 °F (37 4 °C) -- -- -- -- -- -- -- -- --   02/09/23 0900 99 3 °F (37 4 °C) 100 26 Abnormal  168/110 Abnormal  134 98 % -- -- -- --   02/09/23 0800 99 °F (37 2 °C) 92 28 Abnormal  167/95 121 99 % -- -- -- --   02/09/23 0700 99 °F (37 2 °C) 94 18 169/91 123 97 % -- -- -- --   02/09/23 0600 98 6 °F (37 °C) 94 17 165/90 116 96 % -- -- None (Room air) --   02/09/23 0400 98 2 °F (36 8 °C) 100 19 149/89 112 100 % -- -- -- --   02/09/23 0300 98 6 °F (37 °C) 98 12 137/86 105 99 % -- -- -- --   02/09/23 0200 98 6 °F (37 °C) 102 13 137/84 102 99 % -- -- -- --   02/09/23 0100 98 6 °F (37 °C) 98 13 127/86 99 100 % -- -- -- --   02/09/23 0000 98 2 °F (36 8 °C) 96 22 117/74 88 100 % -- -- Nasal cannula --   02/08/23 2300 98 2 °F (36 8 °C) 104 15 131/80 95 99 % -- -- -- --   02/08/23 2230 98 6 °F (37 °C) 106 Abnormal  19 119/74 99 98 % 3 -- Nasal cannula Lying   02/08/23 2200 98 6 °F (37 °C) 98 26 Abnormal  115/76 90 98 % -- -- -- --   02/08/23 2100 98 6 °F (37 °C) 100 17 142/88 108 100 % -- -- -- --   02/08/23 2030 98 2 °F (36 8 °C) 96 16 122/78 92 100 % -- -- -- --   02/08/23 2008 -- -- -- -- -- 100 % -- -- -- --   02/08/23 1945 98 4 °F (36 9 °C) 106 Abnormal  14 122/77 94 100 % 70 -- Ventilator --   02/08/23 1930 98 1 °F (36 7 °C) 108 Abnormal  14 135/79 101 100 % -- -- Ventilator Lying   02/08/23 1915 97 7 °F (36 5 °C) 101 14 123/74 92 100 % -- -- Ventilator --   02/08/23 1900 97 3 °F (36 3 °C) Abnormal  100 14 123/77 95 100 % -- -- Ventilator Sitting   02/08/23 1845 97 °F (36 1 °C) Abnormal  100 14 123/76 95 100 % -- -- Ventilator Lying   02/08/23 1830 96 4 °F (35 8 °C) Abnormal  104 14 118/74 91 100 % -- -- Ventilator Lying   02/08/23 1810 95 9 °F (35 5 °C) Abnormal  114 Abnormal  18 125/70 -- 100 % -- -- Ventilator Lying   02/08/23 1800 95 7 °F (35 4 °C) Abnormal  115 Abnormal  14 100/54 72 100 % -- 15 L/min Non-rebreather mask Lying   02/08/23 1755 95 7 °F (35 4 °C) Abnormal  116 Abnormal  14 101/56 75 100 % -- -- Ventilator --   02/08/23 1750 95 5 °F (35 3 °C) Abnormal  119 Abnormal  30 Abnormal  112/57 78 98 % -- -- -- --   02/08/23 1745 95 5 °F (35 3 °C) Abnormal  117 Abnormal  24 Abnormal  109/57 78 100 % -- 15 L/min Non-rebreather mask Lying   02/08/23 1730 95 4 °F (35 2 °C) Abnormal  115 Abnormal  22 112/55 74 100 % -- 15 L/min Non-rebreather mask Lying   02/08/23 1709 95 5 °F (35 3 °C) Abnormal  112 Abnormal  16 117/68 -- 99 % -- 15 L/min Non-rebreather mask Lying   02/08/23 1640 -- 104 -- 83/51 Abnormal  -- -- -- -- -- --   02/08/23 1634 -- 101 -- 82/53 Abnormal  -- 99 % -- -- Non-rebreather mask Sitting   02/08/23 1620 -- 98 16 86/54 Abnormal  -- 99 % -- -- -- Lying   02/08/23 1614 -- 101 16 87/50 Abnormal  -- 100 % -- -- -- Lying   02/08/23 1607 -- 92 16 74/42 Abnormal  -- -- -- -- -- --   02/08/23 1558 -- 92 18 -- -- -- -- -- None (Room air) --   02/08/23 1556 -- 92 18 74/45 Abnormal  -- 97 % -- -- Non-rebreather mask --   02/08/23 1555 -- 93 -- 70/44 Abnormal  -- -- -- -- -- Lying   02/08/23 1554 96 1 °F (35 6 °C) Abnormal  -- -- -- -- -- -- -- -- --       Pertinent Labs/Diagnostic Test Results:   XR abdomen 1 view kub   Final Result by Sah Arriaga MD (02/09 1022)      Nasogastric tube tip in the stomach               Workstation performed: [id number]         XR chest 1 view portable   Final Result by Sha Arriaga MD (02/09 1020)      Endotracheal tube tip is 4 4 cm above the onur                  Workstation performed: [id number]         CTA ED chest PE Study   Final Result by Chris Powell MD (02/08 1747)      No pulmonary embolism  Dense bibasilar consolidation could represent pneumonia or atelectasis  Bright intraluminal contrast in the stomach is noted on image 2/240  If the patient did not ingest oral contrast, the possibility of a gastric bleed is raised  Correlate clinically        Workstation performed: [id number]         CT head without contrast   Final Result by Chris Powell MD (02/08 1741)      Periventricular white matter hypodensity is similar to prior MRI and has been attributed to possible toxic encephalopathy  No interval change or acute abnormality detected                    Workstation performed: [id number]         [id number] EKG result:Normal sinus rhythm  Prolonged QT    Date and Time Eye Opening Best Verbal Response Best Motor Response [place] Coma Scale Score User   02/09/23 0912 4 5 6 15 JH   02/09/23 0450 4 5 6 15 RB   02/09/23 0030 3 5 6 14 RB   02/08/23 2100 3 1 6 10 RB   02/08/23 1755 1 1 1 3 NP   02/08/23 1750 2 3 4 9 NP   02/08/23 1635 2 3 4 9 [address]   02/08/23 1610 2 2 5 9 GH   02/08/23 1558 2 2 4 8 GH       Results from last 7 days   Lab Units 02/08/23  1609   SARS-COV-2  Negative     Results from last 7 days   Lab Units 02/09/23  0512 02/08/23  1556 02/08/23  1550   WBC Thousand/uL 6 67  --  10 12   HEMOGLOBIN g/dL 12 9  --  12 4   I STAT HEMOGLOBIN g/dl  --  13 6  --    HEMATOCRIT % 41 5  --  39 2   HEMATOCRIT, ISTAT %  --  40  --    PLATELETS Thousands/uL 265  --  288   NEUTROS ABS Thousands/µL 4 97  --  6 77         Results from last 7 days   Lab Units 02/09/23  0512 02/08/23  1556 02/08/23  1550   SODIUM mmol/L 139  --  139   POTASSIUM mmol/L 4 3  --  3 9   CHLORIDE mmol/L 110*  --  113*   CO2 mmol/L 24  --  22   CO2, I-STAT mmol/L  --  24  --    ANION GAP mmol/L 5  --  4   BUN mg/dL 7  --  8   CREATININE mg/dL 0 81  --  0 69   EGFR ml/min/1 73sq m 82  --  99   CALCIUM mg/dL 8 6  --  7 5*   CALCIUM, IONIZED, ISTAT mmol/L  --  1 27  --    MAGNESIUM mg/dL 1 5*  --   --    PHOSPHORUS mg/dL 3 6  --   --      Results from last 7 days   Lab Units 02/09/23  0512 02/08/23  1550   AST U/L 58* 74*   ALT U/L 330* 304*   ALK PHOS U/L 162* 133*   TOTAL PROTEIN g/dL 6 0* 4 7*   ALBUMIN g/dL 3 1* 2 6*   TOTAL BILIRUBIN mg/dL 0 40 0 40   AMMONIA umol/L 35 104*     Results from last 7 days   Lab Units 02/09/23  1157 02/08/23  2352 02/08/23  2101 02/08/23  1609 02/08/23  1544   POC GLUCOSE mg/dl 252* 148* 125 191* 55*     Results from last 7 days   Lab Units 02/09/23  6931 02/08/23  1550   GLUCOSE RANDOM mg/dL 128 192*     Results from last 7 days   Lab Units 02/08/23  1550   PH CHARLES  7 221*   PCO2 CHARLES mm Hg 53 6*   PO2 CHARLES mm Hg 196 5*   HCO3 CHARLES mmol/L 21 5*   BASE EXC CHARLES mmol/L -6 4   O2 CONTENT CHARLES ml/dL 15 2   O2 HGB, VENOUS % 91 2*     Results from last 7 days   Lab Units 02/08/23  1556   PH, CHARLES I-STAT  7 382   PCO2, CHARLES ISTAT mm HG 39 0*   PO2, CHARLES ISTAT mm HG 48 0*   HCO3, CHARLES ISTAT mmol/L 23 1*   I STAT BASE EXC mmol/L -2   I STAT O2 SAT % 82         Results from last 7 days   Lab Units 02/08/23  2102 02/08/23  1755 02/08/23  1550   HS TNI 0HR ng/L  --   --  3   HS TNI 2HR ng/L  --  4  --    HSTNI D2 ng/L  --  1  --    HS TNI 4HR ng/L 5  --   --    HSTNI D4 ng/L 2  --   --      Results from last 7 days   Lab Units 02/08/23  1550   D-DIMER QUANTITATIVE ug/ml FEU 1 02*                 Results from last 7 days   Lab Units 02/08/23  1550   LACTIC ACID mmol/L 0 9                     Results from last 7 days   Lab Units 02/09/23  0526   HEP B S AG  Non-reactive   HEP C AB  Non-reactive   HEP B C IGM  Non-reactive                     Results from last 7 days   Lab Units 02/08/23  1640   CLARITY UA  Turbid   COLOR UA  Light Yellow   SPEC GRAV UA  1 002*   PH UA  5 5   GLUCOSE UA mg/dl 70 (7/100%)*   KETONES UA mg/dl Negative   BLOOD UA  Negative   PROTEIN UA mg/dl Negative   NITRITE UA  Negative   BILIRUBIN UA  Negative   UROBILINOGEN UA (BE) mg/dl <2 0   LEUKOCYTES UA  Large*   WBC UA /hpf 10-20*   RBC UA /hpf 1-2   BACTERIA UA /hpf Occasional   EPITHELIAL CELLS WET PREP /hpf Occasional     Results from last 7 days   Lab Units 02/08/23  1609   INFLUENZA A PCR  Negative   INFLUENZA B PCR  Negative   RSV PCR  Negative         Results from last 7 days   Lab Units 02/08/23  1639   AMPH/METH  Negative   BARBITURATE UR  Negative   BENZODIAZEPINE UR  Negative   COCAINE UR  Negative   METHADONE URINE  Negative   OPIATE UR  Negative   PCP UR  Negative   THC UR  Negative     Results from last 7 days   Lab Units 02/08/23  1550   ETHANOL LVL mg/dL 144*   ACETAMINOPHEN LVL ug/mL 2*   SALICYLATE LVL mg/dL <3*                 Results from last 7 days   Lab Units 02/08/23  1622 02/08/23  1618   BLOOD CULTURE  Received in Microbiology Lab  Culture in Progress  Received in Microbiology Lab  Culture in Progress                 ED Treatment:   Medication Administration from 02/08/2023 1543 to 02/08/2023 2023       Date/Time Order Dose Route Action     02/08/2023 1600 EST norepinephrine (LEVOPHED) 1 mg/mL injection **ADS Override Pull** 4 mg Intravenous (Continuous Infusion) Given     02/08/2023 1600 EST naloxone (NARCAN) 2 MG/2ML injection **ADS Override Pull** 2 mg  Given     02/08/2023 1625 EST hydrocortisone (Solu-CORTEF) injection 100 mg 100 mg Intravenous Given     02/08/2023 1831 EST norepinephrine (LEVOPHED) 4 mg (STANDARD CONCENTRATION) IV in sodium chloride 0 9% 250 mL 0 mcg/min Intravenous Hold     02/08/2023 1809 EST norepinephrine (LEVOPHED) 4 mg (STANDARD CONCENTRATION) IV in sodium chloride 0 9% 250 mL 5 mcg/min Intravenous Rate/Dose Change     02/08/2023 1713 EST norepinephrine (LEVOPHED) 4 mg (STANDARD CONCENTRATION) IV in sodium chloride 0 9% 250 mL 10 mcg/min Intravenous Rate/Dose Change     02/08/2023 1615 EST norepinephrine (LEVOPHED) 4 mg (STANDARD CONCENTRATION) IV in sodium chloride 0 9% 250 mL 15 mcg/min Intravenous Rate/Dose Change     02/08/2023 1600 EST dextrose 50 % IV solution 25 mL 25 mL Intravenous Given     02/08/2023 1647 EST sodium chloride 0 9 % bolus 1,000 mL 0 mL Intravenous Stopped     02/08/2023 1600 EST sodium chloride 0 9 % bolus 1,000 mL 1,000 mL Intravenous New Bag     02/08/2023 1708 EST iohexol (OMNIPAQUE) 350 MG/ML injection (SINGLE-DOSE) 85 mL 85 mL Intravenous Given     02/08/2023 1750 EST Ketamine HCl 100 mg 100 mg Intravenous Given     02/08/2023 1750 EST Succinylcholine Chloride 100 mg/5 mL syringe 100 mg 100 mg Intravenous Given     02/08/2023 1754 EST fentaNYL 1000 mcg in sodium chloride 0 9% 100mL infusion 50 mcg/hr Intravenous New Bag     02/08/2023 1828 EST vecuronium (NORCURON) injection 10 mg 10 mg Intravenous Given by Other        Past Medical History:   Diagnosis Date   • ARF (acute renal failure) ([facility] ) 9/19/2022   • Aspiration pneumonitis ([facility] ) 10/1/2022   • Bacteremia due to methicillin susceptible Staphylococcus aureus (MSSA) 9/27/2022   • Chronic pain disorder    • Depression    • GERD (gastroesophageal reflux disease)    • History of electroconvulsive therapy    • Localized swelling of right upper extremity 9/26/2022   • Low back pain    • Self-injurious behavior    • Suicide attempt [facility])      Present on Admission:  • Toxic metabolic encephalopathy  • Depression  • Abnormal CT of the chest      Admitting Diagnosis: Hyperammonemia ([facility] ) [E72 20]  Altered mental status [R41 82]  Acute respiratory failure with hypoxia ([facility] ) [J96 01]  Unspecified multiple injuries, initial encounter [T07  XXXA]  Age/Sex: 45 y o  female  Admission Orders:  SCD  Cardio-Pulmonary Monitoring, Neuro Checks, Nursing dysphagia assesment, I/O, Daily weights, Vital signs    Scheduled Medications:  enoxaparin, 40 mg, Subcutaneous, [address]  folic acid, 1 mg, Oral, Daily  gabapentin, 300 mg, Oral, TID  multivitamin-minerals, 1 tablet, Oral, Daily  pantoprazole, 40 mg, Oral, Early Morning  thiamine, 100 mg, Oral, Daily  venlafaxine, 187 5 mg, Oral, Daily      Continuous IV Infusions:  dextrose 5 % in lactated Ringer's infusion  Rate: 75 mL/hr Dose: 75 mL/hr  Freq: Continuous Route: IV  Last Dose: 75 mL/hr (02/09/23 0734)  Start: 02/09/23 0330 End: 02/09/23 1237  fentaNYL 1000 mcg in sodium chloride 0 9% 100mL infusion  Rate: 5 mL/hr Dose: 50 mcg/hr  Freq: Continuous Route: IV  Last Dose: Stopped (02/08/23 2135)  Start: 02/08/23 1730 End: 02/08/23 2240  multi-electrolyte (PLASMALYTE-A/ISOLYTE-S PH 7 4) IV solution  Rate: 75 mL/hr Dose: 75 mL/hr  Freq: Continuous Route: IV  Last Dose: Stopped (02/09/23 0343)  Start: 02/08/23 1945 End: 02/09/23 0309  norepinephrine (LEVOPHED) 4 mg (STANDARD CONCENTRATION) IV in sodium chloride 0 9% 250 mL  Rate: 3 8-112 5 mL/hr Dose: 1-30 mcg/min  Freq: Titrated Route: IV  Last Dose: Stopped (02/08/23 1831)  Start: 02/08/23 1630 End: 02/08/23 2240  propofol (DIPRIVAN) 1000 mg in 100 mL infusion (premix)  Rate: 1 8-18 mL/hr Dose: 5-50 mcg/kg/min  Weight Dosing Info: 59 9 kg  Freq: Titrated Route: IV  Last Dose: Stopped (02/08/23 2135)  Start: 02/08/23 1945 End: 02/08/23 2240     PRN Meds:  ALPRAZolam, 0 5 mg, Oral, Daily PRN  oxyCODONE, 5 mg, Oral, Q6H PRN 02/09 x 1        IP CONSULT TO CASE MANAGEMENT    Network Utilization Review Department  ATTENTION: Please call with any questions or concerns to [phone] and carefully listen to the prompts so that you are directed to the right person  All voicemails are confidential   Vipin Nguyễn all requests for admission clinical reviews, approved or denied determinations and any other requests to dedicated fax number below belonging to the campus where the patient is receiving treatment   List of dedicated fax numbers for the Facilities:  [address] DENIALS (Administrative/Medical Necessity) [phone]   [address] (Maternity/NICU/Pediatrics) [phone]   917 Minda Baldwin [phone]   [facility] [address] [phone]   1306 [facility] [address] [place] [address] Wanda Bowman [place] [address] [phone]   [address] [facility] [address] [facility] [place] [address] [facility] [phone]

## 2023-02-09 NOTE — ASSESSMENT & PLAN NOTE
· Hx of suicide attempt, currently denies SI  · Hx of EtOH use and Benzodiazepine use  · MV/thiamine/folate  · Continue venlafaxine

## 2023-02-09 NOTE — PLAN OF CARE
Problem: Potential for Falls  Goal: Patient will remain free of falls  Description: INTERVENTIONS:  - Educate patient/family on patient safety including physical limitations  - Instruct patient to call for assistance with activity   - Consult OT/PT to assist with strengthening/mobility   - Keep Call bell within reach  - Keep bed low and locked with side rails adjusted as appropriate  - Keep care items and personal belongings within reach  - Initiate and maintain comfort rounds  - Make Fall Risk Sign visible to staff  - Offer Toileting every 2 Hours, in advance of need  - Initiate/Maintain bed and chairalarm  - Obtain necessary fall risk management equipment:   - Apply yellow socks and bracelet for high fall risk patients  - Consider moving patient to room near nurses station  2/9/2023 0625 by Hieu Dela Cruz RN  Outcome: Progressing  2/9/2023 0625 by Hieu Dela Cruz RN  Outcome: Progressing     Problem: MOBILITY - ADULT  Goal: Maintain or return to baseline ADL function  Description: INTERVENTIONS:  -  Assess patient's ability to carry out ADLs; assess patient's baseline for ADL function and identify physical deficits which impact ability to perform ADLs (bathing, care of mouth/teeth, toileting, grooming, dressing, etc )  - Assess/evaluate cause of self-care deficits   - Assess range of motion  - Assess patient's mobility; develop plan if impaired  - Assess patient's need for assistive devices and provide as appropriate  - Encourage maximum independence but intervene and supervise when necessary  - Involve family in performance of ADLs  - Assess for home care needs following discharge   - Consider OT consult to assist with ADL evaluation and planning for discharge  - Provide patient education as appropriate  2/9/2023 0625 by Hieu Dela Cruz RN  Outcome: Progressing  2/9/2023 0625 by Hieu Dela Cruz RN  Outcome: Progressing  Goal: Maintains/Returns to pre admission functional level  Description: INTERVENTIONS:  - Perform BMAT or MOVE assessment daily    - Set and communicate daily mobility goal to care team and patient/family/caregiver  - Collaborate with rehabilitation services on mobility goals if consulted  - Perform Range of Motion 3 times a day  - Reposition patient every 2 hours    - Out of bed for toileting  - Record patient progress and toleration of activity level   2/9/2023 0625 by Ned Jane RN  Outcome: Progressing  2/9/2023 0625 by Ned Jane RN  Outcome: Progressing
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
## 2023-02-09 NOTE — CASE MANAGEMENT
Case Management Assessment & Discharge Planning Note    Patient name Neil Harehand  Location MICU 08/MICU 08 MRN [id number]  : 1970 Date 2023       Current Admission Date: 2023  Current Admission Diagnosis:Toxic metabolic encephalopathy   Patient Active Problem List    Diagnosis Date Noted   • Prolonged Q-T interval on ECG 2023   • Foot drop, right 2023   • Spinal stenosis of lumbar region with neurogenic claudication    • Cyclic citrullinated peptide (CCP) antibody positive 2023   • Right foot pain 2022   • Ambulatory dysfunction 2022   • Mild protein-calorie malnutrition ([address] ) 10/09/2022   • Diarrhea 10/03/2022   • Hypokalemia 10/02/2022   • Hypernatremia 10/02/2022   • Pulmonary edema 2022   • Leg edema, right 2022   • D-dimer, elevated 2022   • Transaminitis 2022   • Toxic metabolic encephalopathy    • Abnormal CT of the chest 2022   • Traumatic rhabdomyolysis ([address] ) 2022   • Hyperglycemia 2022   • Opioid dependence ([address] ) 2021   • Chronic right shoulder pain 2021   • Internal hemorrhoids 2020   • Adnexal cyst 2020   • Ischemic colitis ([address] ) 2020   • Intercostal neuralgia    • Hematochezia 2019   • Insomnia 2019   • History of rib fracture 10/28/2018   • Tobacco abuse 10/28/2018   • Right knee pain 2018   • Generalized anxiety disorder 2018   • Hypertension    • Hyperlipidemia    • Depression    • COPD (chronic obstructive pulmonary disease) ([address] )    • Chondromalacia patellae 2018   • Irritable bowel syndrome with diarrhea 2018   • DDD (degenerative disc disease), lumbosacral 2014      LOS (days): 1  Geometric Mean LOS (GMLOS) (days):   Days to GMLOS:     OBJECTIVE:    Risk of Unplanned Readmission Score: 21 13         Current admission status: Inpatient       Preferred Pharmacy:   [facility] [place], [address]  [address]  Phone: [phone] Fax: [address], [facility] [address] [address]  Phone: [phone] Fax: [phone]    Primary Care Provider: Corby Naylor MD    Primary Insurance: BLUE CROSS  Secondary Insurance:     ASSESSMENT:  [address], [address] Baldwin Representative - Spouse   Primary Phone: [phone] (Mobile)  Work Phone: [phone]                         Readmission Root Cause  30 Day Readmission: No    Patient Information  Admitted from[de-identified] Home  Mental Status: Alert  During Assessment patient was accompanied by: Not accompanied during assessment  Assessment information provided by[de-identified] Patient, Spouse  Primary Caregiver: Self  Support Systems: Spouse/significant other, Daughter  [place] of Residence: [address] [place] [address] do you live in?: [place] [place] entry access options   Select all that apply : Stairs  Number of steps to enter home : 4  Type of Current Residence: 2 story home  Upon entering residence, is there a bedroom on the main floor (no further steps)?: No  A bedroom is located on the following floor levels of residence (select all that apply):: 2nd Floor  Upon entering residence, is there a bathroom on the main floor (no further steps)?: No  Indicate which floors of current residence have a bathroom (select all the apply):: 2nd Floor  Number of steps to 2nd floor from main floor: One Flight  In the last 12 months, was there a time when you were not able to pay the mortgage or rent on time?: No  In the last 12 months, was there a time when you did not have a steady place to sleep or slept in a shelter (including now)?: No  Homeless/housing insecurity resource given?: N/A  Living Arrangements: Lives w/ Spouse/significant other  Is patient a ?: No    Activities of Daily Living Prior to Admission  Functional Status: Assistance  Completes ADLs independently?: Yes  Ambulates independently?: Yes  Does patient use assisted devices?: Yes  Assisted Devices (DME) used: Manan Arnett, Bedside Commode  Does patient currently own DME?: Yes  What DME does the patient currently own?: Bedside Commode, Shower Chair, Carnella Keep  Does patient have a history of Outpatient Therapy (PT/OT)?: Yes  Does the patient have a history of Short-Term Rehab?: Yes ([facility])  Does patient have a history of HHC?: No  Does [place] currently [place] [address]?: No         Patient Information Continued  Income Source: Unemployed  Within the past 12 months, you worried that your food would run out before you got the money to buy more : Never true  Within the past 12 months, the food you bought just didn't last and you didn't have money to get more : Never true  Food insecurity resource given?: N/A  Does patient have a history of substance abuse?: Yes  Historical substance use preference: Alcohol/ETOH (as per  was in alcohol rehab about 10-15 years ago, that drinking at this time is not a problem)  Is patient currently in treatment for substance abuse?: No  Patient declined treatment information    Does patient have a history of Mental Health Diagnosis?: Yes (depression)  Is patient receiving treatment for mental health?: Yes (medication management, counseling)  Has patient received inpatient treatment related to mental health in the last 2 years?: No         Means of Transportation  Means of Transport to Appts[de-identified] Family transport  In the past 12 months, has lack of transportation kept you from medical appointments or from getting medications?: No  In the past 12 months, has lack of transportation kept you from meetings, work, or from getting things needed for daily living?: No  Was application for public transport provided?: N/A        DISCHARGE DETAILS:    Discharge planning discussed with[de-identified] patient,  via phone  Freedom of Choice: Yes     CM contacted family/caregiver?: Yes  Were Treatment Team discharge recommendations reviewed with patient/caregiver?: Yes  Did patient/caregiver verbalize understanding of patient care needs?: Yes  Were patient/caregiver advised of the risks associated with not following Treatment Team discharge recommendations?: Yes    Contacts  Patient Contacts: Naldo Johnston,   Relationship to Patient[de-identified] Family  Contact Method: Phone  Phone Number: [phone]  Reason/Outcome: Continuity of Care, Emergency Contact, Discharge Planning                        Treatment Team Recommendation: Other (TBD)  Discharge Destination Plan[de-identified] Other (TBD -- awaiting recommendations)  Transport at Discharge : Other (Comment) (TBD)                Patient/caregiver received discharge checklist   Content reviewed  Patient/caregiver encouraged to participate in discharge plan of care prior to discharge home  CM reviewed d/c planning process including the following: identifying help at home, patient preference for d/c planning needs, Discharge Lounge, [facility] Meds to Bed program, availability of treatment team to discuss questions or concerns patient and/or family may have regarding understanding medications and recognizing signs and symptoms once discharged  CM also encouraged patient to follow up with all recommended appointments after discharge  Patient advised of importance for patient and family to participate in managing patient’s medical well being  Additional Comments: Introduced self and role to patient at bedside, and spoke to  separately via phone  Patient resides at home with , uses walker, shower chair, commode  Patient does have a history of depression and takes medication and monthly counseling; does have a history of alcohol abuse, with a rehab stay about 10-15 years ago as per     Patient was tearful during assessment, saying she feels stuck at home, that her  works a lot and she doesn't really have any other support  CM asked about counseling/therapy, patient stated she couldn't do that because she is unable to leave the home, CM suggested tele-health, patient states she wasn't good with computers and did not have a smart phone  When speaking with , he stated he works from home most days, and patient does have a smart phone, that he would support therapy for patient, as she's "had a hard road "  He agreed to be supportive of that, he stated patient has a monthly counselor but believes it could be more  CM will continue to follow for discharge needs
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
## 2023-02-09 NOTE — ED NOTES
Attempted to call report to MICU at this time, nurse is in report and will call back     Elana Tillman RN  02/08/23 9121

## 2023-02-09 NOTE — PROGRESS NOTES
Daily Progress Note - Critical Care   Jose Elias Recinos 45 y o  female MRN: [id number]  [facility]: [id number]        ----------------------------------------------------------------------------------------  HPI/hospital course: Jose Elias Recinos is a 45 y o  female with PMH chronic pulmonary edema, chronic opioid use, COPD, HTN, and depression with history of suicide attempt who presents with altered mental status  As per patient's , she was in her normal state of health earlier today  Patient reportedly was drinking alcohol earlier and took some Xanax for fear of her scheduled EMG today being painful  They were driving to the office for EMG studies of her chronic right lower extremity pain and parethesias  They had an argument, and she became very agitated and angry  Apparently when they arrived, she fell out of the car but did not hit her head  She became poorly responsive, so her  called for help  She was then brought to the ED, where she was found to be hypotensive, hypoxic, and minimally responsive  BG on arrival was 55  She was stabilized with fluids, pressors, and IV glucose  On route to CT, she was intubated for airway protection secondary to worsening mental status  Her liver enzymes were elvated with , AST 74, alk phos 133, and ammonia 104  Coma panel positive for ethanol of 144  D dimer was 1 02, so CT PE study performed which was negative for acute PE  There was dense bibasilar consolidation  CT head with evidence of possible toxic encephalopathy  UDS was negative  Patient admitted to MICU for further monitoring of her AMS     Of note, she recently finished a course of steroids for a gout flare  She also had an admission for 2 months ending last November for metabolic encephalopathy presumed secondary to ANUJA, morphine, and gabapentin use      24hr events: Shortly after arrival to MICU, patient was successfully extubated as she was following commands and taking adequate tidal volume  She remained sleepy but rousable overnight  Patient admits to drinking alcohol and taking a few Xanax earlier due to fears of her EMG being painful  She does not remember anything after getting out of her car to go to the EMG  She denies recreational drug use     ---------------------------------------------------------------------------------------  SUBJECTIVE  Feeling ok but tired    Review of systems was reviewed and negative unless stated above in HPI/24-hour events   ---------------------------------------------------------------------------------------  Assessment and Plan:    Neuro:   • Diagnosis: Acute metabolic encephalopathy  ? Presumed secondary to alcohol intoxication with possible coingestion of benzos  ? 2/8 CT head- periventricular white matter hypodensity is similar to prior MRI and has been attributed to possible toxic encephalopathy   ? Successfully extubated on 2/8  ? Continue thiamine, folate, and multi-vitamin for possible alcohol abuse  • Diagnosis: Depression with history of suicide attempt  ? Consider restarting home 187 5 mg venlafaxine daily and 0 5 mg alprazolam PRN with AMS  • Diagnosis: Chronic pain disorder  ? Hold home 50 mg tramadol TID and gabapentin 300 mg 4x daily with AMS  • Pain control and sedation with propofol and fentanyl  • CAM-ICU precautions        CV:   • Diagnosis: HTN  ? Patient is not on medication at home  ? Continue to monitor  • Diagnosis: Prolonged QTc  ? 2/8 QTc 487  ? 11/25/22 QTc 439  ? Avoid QT prolonging meds        Pulm:  • Diagnosis: Acute hypoxic respiratory failure  ? Secondary to AMS  ? 2/8 PE study- Dense bibasilar consolidation could represent pneumonia or atelectasis  ? CT findings possible her chronic pulmonary edema  ? 2/8 intubated and extubated  ? No further evidence of hypoxia  • Diagnosis: Chronic pulmonary edema  ? Patient apparently follows with outpatient pulmonologist as per PCP, but no Epic notes  ?  Will need to clarify history of this with patient when mental status improves  • SpO2 goal>92%        GI:   • Diagnosis: Elevated liver enzymes  ? Presumed secondary to acute encephalopathy or possible drug-induced injury  ? Continue to trend  • Diagnosis: Hyperammonemia  ? Likely secondary to concomitant alcohol and benzo use  ? Start 20 g lactulose TID  • Diagnosis: Abnormal CT finding  ? 2/8 CT PE study- Bright intraluminal contrast in the stomach is noted on image 2/240  If the patient did not ingest oral contrast, the possibility of a gastric bleed is raised   Correlate clinically  ? No overt signs of bleeding but follow AM hgb  • Diagnosis: GERD  ? Patient normally on 40 mg omeprazole daily but non-formulary here  ? Continue pantoprazole 40 mg daily  • GI prophylaxis: None  • Bowel regimen: None        :   • No acute concerns  • Monitor I+Os        F/E/N:   • F: 75 mL/hr D5LR  • E: Replete for Mg>2, K>4, Phos>3   • N: NPO with sips, advance as tolerated        Heme/Onc:   • No acute concerns  • DVT prophylaxis: enoxaparin        Endo:   • No acute concerns  • Q6H fingerstick given hypoglycemia on arrival        ID:   • No acute concerns  • Follow fever curve and WBC        MSK/Skin:   • Frequent position changes to avoid pressure wounds  • PT/OT as tolerated    Lines/Drains/Airways: page FARIAS    Patient appropriate for transfer out of the ICU today?: Patient does not meet criteria for ICU Follow-up Clinic; referral has not been made  Disposition: Transfer to Med-Surg   Code Status: Level 1 - Full Code  ---------------------------------------------------------------------------------------  ICU CORE MEASURES    Prophylaxis   VTE Pharmacologic Prophylaxis: Enoxaparin (Lovenox)  VTE Mechanical Prophylaxis: sequential compression device  Stress Ulcer Prophylaxis: Prophylaxis Not Indicated     ABCDE Protocol (if indicated)  Plan to perform spontaneous awakening trial today?  Not applicable  Plan to perform spontaneous breathing trial today? Not applicable  Obvious barriers to extubation? Not applicable  CAM-ICU: Positive    Invasive Devices Review  Invasive Devices     Peripheral Intravenous Line  Duration           Peripheral IV 23 Distal;Left;Ventral (anterior) Forearm <1 day    Peripheral IV 23 Right;Ventral (anterior) Forearm <1 day          Drain  Duration           Urethral Catheter <1 day              Can any invasive devices be discontinued today? Yes  ---------------------------------------------------------------------------------------  OBJECTIVE    Vitals   Vitals:    23 2300 23 0000 23 0100 23 0200   BP: 131/80 117/74 127/86 137/84   BP [place]       Pulse: 104 96 98 102   Resp: 15 22 13 13   Temp: 98 2 °F (36 8 °C) 98 2 °F (36 8 °C) 98 6 °F (37 °C) 98 6 °F (37 °C)   TempSrc: Bladder   Bladder   SpO2: 99% 100% 100% 99%   Weight:         Temp (24hrs), Av 2 °F (36 2 °C), Min:95 4 °F (35 2 °C), Max:98 6 °F (37 °C)  Current: Temperature: 98 6 °F (37 °C)    Respiratory:  SpO2: SpO2: 99 %       Invasive/non-invasive ventilation settings   Respiratory    Lab Data (Last 4 hours)    None         O2/Vent Data (Last 4 hours)    None                Physical Exam:  General: Sleepy but rousable  CV: Regular rate and rhythm  Pulm: CTAB  Abd: Soft and nontender  Extremities: No pedal edema   Skin: Warm and dry  Neuro:  Follows commands in all extremities  Psych: Mood and behavioral normal        Laboratory and Diagnostics:  Results from last 7 days   Lab Units 23  1556 23  1550   WBC Thousand/uL  --  10 12   HEMOGLOBIN g/dL  --  12 4   I STAT HEMOGLOBIN g/dl 13 6  --    HEMATOCRIT %  --  39 2   HEMATOCRIT, ISTAT % 40  --    PLATELETS Thousands/uL  --  288   NEUTROS PCT %  --  67   MONOS PCT %  --  7     Results from last 7 days   Lab Units 23  1556 23  1550   SODIUM mmol/L  --  139   POTASSIUM mmol/L  --  3 9   CHLORIDE mmol/L  --  113*   CO2 mmol/L  --  22   CO2, I-STAT mmol/L 24  -- ANION GAP mmol/L  --  4   BUN mg/dL  --  8   CREATININE mg/dL  --  0 69   CALCIUM mg/dL  --  7 5*   GLUCOSE RANDOM mg/dL  --  192*   ALT U/L  --  304*   AST U/L  --  74*   ALK PHOS U/L  --  133*   ALBUMIN g/dL  --  2 6*   TOTAL BILIRUBIN mg/dL  --  0 40                   Results from last 7 days   Lab Units 02/08/23  1550   LACTIC ACID mmol/L 0 9     ABG:    VBG:  Results from last 7 days   Lab Units 02/08/23  1550   PH CHARLES  7 221*   PCO2 CHARLES mm Hg 53 6*   PO2 CHARLES mm Hg 196 5*   HCO3 CHARLES mmol/L 21 5*   BASE EXC CHARLES mmol/L -6 4           Micro  Results from last 7 days   Lab Units 02/08/23  1622 02/08/23  1618   BLOOD CULTURE  Received in Microbiology Lab  Culture in Progress  Received in Microbiology Lab  Culture in Progress  EKG: n/a  Imaging: n/a    Intake and Output  I/O       02/07 0701 02/08 0700 02/08 0701 02/09 0700    I V  (mL/kg)  408 5 (7)    IV Piggyback  2000    Total Intake(mL/kg)  2408 5 (41 5)    Urine (mL/kg/hr)  2150    Total Output  2150    Net  +258 5                  Height and Weights         Body mass index is 19 91 kg/m²  Weight (last 2 days)     Date/Time Weight    02/08/23 1942 58 (127 87)    02/08/23 1554 59 9 (132 06)            Nutrition       Diet Orders   (From admission, onward)             Start     Ordered    02/08/23 2359  Diet NPO; Sips of clear liquids  Diet effective now        References:    Nutrtion Support Algorithm Enteral vs  Parenteral   Question Answer Comment   Diet Type NPO    NPO Except: Sips of clear liquids    RD to adjust diet per protocol?  Yes        02/08/23 2358                  Active Medications  Scheduled Meds:  Current Facility-Administered Medications   Medication Dose Route Frequency Provider Last Rate   • dextrose 5% lactated ringer's  75 mL/hr Intravenous Continuous Karel Frankel MD     • enoxaparin  40 mg Subcutaneous Q24H [facility] Karel Frankel MD     • folic acid  1 mg Oral Daily Karel Frankel MD     • lactulose  20 g Oral TID Yani Jennings MD     • multivitamin-minerals  1 tablet Oral Daily Yani Jennings MD     • pantoprazole  40 mg Oral Early Morning Yani Jennings MD     • thiamine  100 mg Oral Daily Yani Jennings MD       Continuous Infusions:  dextrose 5% lactated ringer's, 75 mL/hr      PRN Meds:        Allergies   Allergies   Allergen Reactions   • Chantix [Varenicline]    • Ibuprofen Other (See Comments)     Upset stomach   • Lyrica [Pregabalin] Other (See Comments)     bruising   • Penicillins Other (See Comments)     ? hives   • Sulfa Antibiotics Other (See Comments)     sloughing skin in mouth   • Sulfasalazine      ---------------------------------------------------------------------------------------  Advance Directive and Living Will:      Power of :    POLST:    ---------------------------------------------------------------------------------------  Care Time Delivered:   No Critical Care time spent     Yani Jennings MD      Portions of the record may have been created with voice recognition software  Occasional wrong word or "sound a like" substitutions may have occurred due to the inherent limitations of voice recognition software    Read the chart carefully and recognize, using context, where substitutions have occurred

## 2023-02-10 VITALS
WEIGHT: 127.87 LBS | BODY MASS INDEX: 20.07 KG/M2 | OXYGEN SATURATION: 96 % | HEART RATE: 99 BPM | DIASTOLIC BLOOD PRESSURE: 83 MMHG | HEIGHT: 67 IN | RESPIRATION RATE: 26 BRPM | TEMPERATURE: 98.1 F | SYSTOLIC BLOOD PRESSURE: 144 MMHG

## 2023-02-10 LAB
ALBUMIN SERPL BCP-MCNC: 3.3 G/DL (ref 3.5–5)
ALP SERPL-CCNC: 192 U/L (ref 46–116)
ALT SERPL W P-5'-P-CCNC: 262 U/L (ref 12–78)
ANION GAP SERPL CALCULATED.3IONS-SCNC: 5 MMOL/L (ref 4–13)
AST SERPL W P-5'-P-CCNC: 76 U/L (ref 5–45)
BILIRUB SERPL-MCNC: 0.68 MG/DL (ref 0.2–1)
BUN SERPL-MCNC: 6 MG/DL (ref 5–25)
CALCIUM ALBUM COR SERPL-MCNC: 9.7 MG/DL (ref 8.3–10.1)
CALCIUM SERPL-MCNC: 9.1 MG/DL (ref 8.3–10.1)
CHLORIDE SERPL-SCNC: 101 MMOL/L (ref 96–108)
CO2 SERPL-SCNC: 28 MMOL/L (ref 21–32)
CREAT SERPL-MCNC: 0.55 MG/DL (ref 0.6–1.3)
ERYTHROCYTE [DISTWIDTH] IN BLOOD BY AUTOMATED COUNT: 16.1 % (ref 11.6–15.1)
GFR SERPL CREATININE-BSD FRML MDRD: 106 ML/MIN/1.73SQ M
GLUCOSE SERPL-MCNC: 113 MG/DL (ref 65–140)
GLUCOSE SERPL-MCNC: 126 MG/DL (ref 65–140)
HCT VFR BLD AUTO: 41 % (ref 34.8–46.1)
HGB BLD-MCNC: 13.1 G/DL (ref 11.5–15.4)
MAGNESIUM SERPL-MCNC: 1.6 MG/DL (ref 1.6–2.6)
MCH RBC QN AUTO: 30.5 PG (ref 26.8–34.3)
MCHC RBC AUTO-ENTMCNC: 32 G/DL (ref 31.4–37.4)
MCV RBC AUTO: 95 FL (ref 82–98)
PLATELET # BLD AUTO: 248 THOUSANDS/UL (ref 149–390)
PMV BLD AUTO: 9.6 FL (ref 8.9–12.7)
POTASSIUM SERPL-SCNC: 3.5 MMOL/L (ref 3.5–5.3)
PROT SERPL-MCNC: 6.5 G/DL (ref 6.4–8.4)
RBC # BLD AUTO: 4.3 MILLION/UL (ref 3.81–5.12)
SODIUM SERPL-SCNC: 134 MMOL/L (ref 135–147)
WBC # BLD AUTO: 7.59 THOUSAND/UL (ref 4.31–10.16)

## 2023-02-10 RX ORDER — TRAMADOL HYDROCHLORIDE 50 MG/1
50 TABLET ORAL EVERY 8 HOURS PRN
Qty: 10 TABLET | Refills: 0 | Status: SHIPPED | OUTPATIENT
Start: 2023-02-10 | End: 2023-02-20

## 2023-02-10 RX ORDER — FOLIC ACID 1 MG/1
1 TABLET ORAL DAILY
Qty: 30 TABLET | Refills: 0 | Status: CANCELLED | OUTPATIENT
Start: 2023-02-11

## 2023-02-10 RX ORDER — LANOLIN ALCOHOL/MO/W.PET/CERES
100 CREAM (GRAM) TOPICAL DAILY
Qty: 30 TABLET | Refills: 0 | Status: CANCELLED | OUTPATIENT
Start: 2023-02-11

## 2023-02-10 RX ADMIN — PANTOPRAZOLE SODIUM 40 MG: 40 TABLET, DELAYED RELEASE ORAL at 05:17

## 2023-02-10 RX ADMIN — THIAMINE HCL TAB 100 MG 100 MG: 100 TAB at 09:17

## 2023-02-10 RX ADMIN — OXYCODONE HYDROCHLORIDE 5 MG: 5 TABLET ORAL at 00:43

## 2023-02-10 RX ADMIN — VENLAFAXINE HYDROCHLORIDE 187.5 MG: 150 CAPSULE, EXTENDED RELEASE ORAL at 09:16

## 2023-02-10 RX ADMIN — ENOXAPARIN SODIUM 40 MG: 40 INJECTION SUBCUTANEOUS at 09:18

## 2023-02-10 RX ADMIN — FOLIC ACID 1 MG: 1 TABLET ORAL at 09:17

## 2023-02-10 RX ADMIN — GABAPENTIN 300 MG: 300 CAPSULE ORAL at 16:24

## 2023-02-10 RX ADMIN — OXYCODONE HYDROCHLORIDE 5 MG: 5 TABLET ORAL at 09:17

## 2023-02-10 RX ADMIN — OXYCODONE HYDROCHLORIDE 5 MG: 5 TABLET ORAL at 05:20

## 2023-02-10 RX ADMIN — OXYCODONE HYDROCHLORIDE 5 MG: 5 TABLET ORAL at 13:40

## 2023-02-10 RX ADMIN — GABAPENTIN 300 MG: 300 CAPSULE ORAL at 09:17

## 2023-02-10 NOTE — DISCHARGE SUMMARY
Discharge Summary - [facility]    Patient Information: Felicita Sullivan 45 y o  female MRN: [id number]  Unit/Bed#: -01 Encounter: [id number]    Discharging Physician / Practitioner: Elena Lora MD  PCP: Rm Braxton MD  Admission Date: 2/8/2023  Discharge Date: 02/10/23    Disposition:     Home    Reason for Admission: Toxic metabolic encephalopathy    Discharge Diagnoses:     Principal Problem:    Toxic metabolic encephalopathy  Active Problems:    Depression    Abnormal CT of the chest    Prolonged Q-T interval on ECG  Resolved Problems:    * No resolved hospital problems  *      Consultations During Hospital Stay:  · Critical care    Procedures Performed:     · Intubation    Significant Findings / Test Results:      Ct abdomen-No pulmonary embolism  Dense bibasilar consolidation could represent pneumonia or atelectasis  Ct head-Periventricular white matter hypodensity is similar to prior MRI and has been attributed to possible toxic encephalopathy  No interval change or acute abnormality detected  Incidental Findings:   ·     Test Results Pending at Discharge (will require follow up):   ·      Outpatient Tests Requested:  ]  Complications:  none    Hospital Course:     Felicita Sullivan is a 45 y o  female patient who originally presented to the hospital on 2/8/2023 due to encephalopathy  This is a 49-year-old female with chronic pain and right lower extremity weakness and sensory abnormality  Patient was supposed to get an EMG done as an outpatient  Patient was worried about the pain during the EMG so she took Xanax and also alcohol  When she was getting out of the car she fell on her hands and knees  But was able to make it to the office  In the office patient noted to be more somnolent and she was sent to the emergency room  Patient was noted to be hypoglycemic in the office  patient needed intubation for airway protection and was admitted to the critical care unit  Patient was able to be extubated and was transferred out of the unit  I saw her on the day of discharge  Patient reported that her pain was not adequately controlled  Patient reported that she was on tramadol and oxycodone as an outpatient  I reviewed the PDMP  It appears that the patient was on Nucynta and was last given in December for 28 days and since then she was on tramadol  Patient had failed 9-day pills of tramadol on 1/20  Patient have outpatient pain management specialist who manages her pain  I contacted her outpatient pain management specialist he reported that he will see her soon and adjust the medication  I also talked to our pain service and they recommended no change in her current regimen since there is no acute issues going on in her pain is chronic  When I went back into the room to inform about the discharge plan and informed her that I contacted her pain management specialist she got angry  She reported that she did not give me permission even though in the morning I told her that I will be contacting her pain management specialist   She wanted to talk to my supervisor  Lazarus Jean from hospital at administration went to the room and talked to the patient  Patient wants to be discharged from the hospital   She reported that she needed pain medication for the weekend  We will give 10 tramadol tablets for the weekend and she needs to contact her pain management specialist for any further change in her pain regimen  Patient was discharged in a stable condition on 2/10/2023  Patient remained hemodynamically stable and afebrile  Of note patient noted to have LFT evaluation at the time of presentation which is improved likely secondary to alcohol  For details refer to the chart    Condition at Discharge: good     Discharge Day Visit / Exam:     Subjective: Patient seen and examined  Was admitted to the intensive care unit with encephalopathy requiring intubation    By reviewing the chart it appears that patient was drinking alcohol and taking Xanax before the EMG procedure  Patient reported that she needs adjustment in her pain medication  It appears that patient has chronic pain and is followed by pain management physician as outpatient  Vitals: Blood Pressure: 144/83 (02/10/23 0631)  Pulse: 99 (02/10/23 0631)  Temperature: 98 1 °F (36 7 °C) (02/10/23 0631)  Temp Source: Bladder (02/09/23 1000)  Respirations: (!) 26 (02/09/23 1000)  Height: 5' 7" (170 2 cm) (02/09/23 0600)  Weight - Scale: 58 kg (127 lb 13 9 oz) (02/08/23 1942)  SpO2: 96 % (02/10/23 0631)  Exam:   Physical Exam  Constitutional:       General: She is not in acute distress  Appearance: She is not ill-appearing  HENT:      Head: Normocephalic and atraumatic  Nose: Nose normal    Eyes:      General: No scleral icterus  Pupils: Pupils are equal, round, and reactive to light  Cardiovascular:      Rate and Rhythm: Regular rhythm  Heart sounds: Normal heart sounds  Pulmonary:      Breath sounds: Normal breath sounds  Abdominal:      Palpations: Abdomen is soft  Musculoskeletal:         General: Normal range of motion  Cervical back: Normal range of motion and neck supple  Skin:     General: Skin is warm  Neurological:      Mental Status: She is alert and oriented to person, place, and time  Comments: Decreased sensation on right lower extremity  Muscle mass is decreased on right side         Discussion with Family: Patient    Discharge instructions/Information to patient and family:   See after visit summary for information provided to patient and family  Provisions for Follow-Up Care:  See after visit summary for information related to follow-up care and any pertinent home health orders  Planned Readmission:  none     Discharge Statement:  I spent 45  minutes discharging the patient  This time was spent on the day of discharge   I had direct contact with the patient on the day of discharge  Greater than 50% of the total time was spent examining patient, answering all patient questions, arranging and discussing plan of care with patient as well as directly providing post-discharge instructions  Additional time then spent on discharge activities  Discharge Medications:  See after visit summary for reconciled discharge medications provided to patient and family        ** Please Note: This note has been constructed using a voice recognition system **

## 2023-02-10 NOTE — PLAN OF CARE
Problem: Potential for Falls  Goal: Patient will remain free of falls  Description: INTERVENTIONS:  - Educate patient/family on patient safety including physical limitations  - Instruct patient to call for assistance with activity   - Consult OT/PT to assist with strengthening/mobility   - Keep Call bell within reach  - Keep bed low and locked with side rails adjusted as appropriate  - Keep care items and personal belongings within reach  - Initiate and maintain comfort rounds  - Make Fall Risk Sign visible to staff  - Offer Toileting every Hours, in advance of need  - Initiate/Maintain   alarm  - Obtain necessary fall risk management equipment:       - Apply yellow socks and bracelet for high fall risk patients  - Consider moving patient to room near nurses station  Outcome: Progressing     Problem: MOBILITY - ADULT  Goal: Maintain or return to baseline ADL function  Description: INTERVENTIONS:  -  Assess patient's ability to carry out ADLs; assess patient's baseline for ADL function and identify physical deficits which impact ability to perform ADLs (bathing, care of mouth/teeth, toileting, grooming, dressing, etc )  - Assess/evaluate cause of self-care deficits   - Assess range of motion  - Assess patient's mobility; develop plan if impaired  - Assess patient's need for assistive devices and provide as appropriate  - Encourage maximum independence but intervene and supervise when necessary  - Involve family in performance of ADLs  - Assess for home care needs following discharge   - Consider OT consult to assist with ADL evaluation and planning for discharge  - Provide patient education as appropriate  Outcome: Progressing  Goal: Maintains/Returns to pre admission functional level  Description: INTERVENTIONS:  - Perform BMAT or MOVE assessment daily    - Set and communicate daily mobility goal to care team and patient/family/caregiver     - Collaborate with rehabilitation services on mobility goals if consulted  - Perform Range of Motion    times a day  - Reposition patient every    hours    - Dangle patient    times a day  - Stand patient      times a day  - Ambulate patient      times a day  - Out of bed to chair    times a day   - Out of bed for meals      times a day  - Out of bed for toileting  - Record patient progress and toleration of activity level   Outcome: Progressing     Problem: Prexisting or High Potential for Compromised Skin Integrity  Goal: Skin integrity is maintained or improved  Description: INTERVENTIONS:  - Identify patients at risk for skin breakdown  - Assess and monitor skin integrity  - Assess and monitor nutrition and hydration status  - Monitor labs   - Assess for incontinence   - Turn and reposition patient  - Assist with mobility/ambulation  - Relieve pressure over bony prominences  - Avoid friction and shearing  - Provide appropriate hygiene as needed including keeping skin clean and dry  - Evaluate need for skin moisturizer/barrier cream  - Collaborate with interdisciplinary team   - Patient/family teaching  - Consider wound care consult   Outcome: Progressing

## 2023-02-11 LAB
BACTERIA UR CULT: ABNORMAL
BACTERIA UR CULT: ABNORMAL

## 2023-02-12 RX ORDER — DOXYCYCLINE HYCLATE 100 MG/1
100 CAPSULE ORAL EVERY 12 HOURS SCHEDULED
Qty: 10 CAPSULE | Refills: 0 | Status: SHIPPED | OUTPATIENT
Start: 2023-02-12 | End: 2023-02-17

## 2023-02-13 ENCOUNTER — TELEPHONE (OUTPATIENT)
Dept: FAMILY MEDICINE CLINIC | Facility: CLINIC | Age: 53
End: 2023-02-13

## 2023-02-13 ENCOUNTER — TRANSITIONAL CARE MANAGEMENT (OUTPATIENT)
Dept: FAMILY MEDICINE CLINIC | Facility: CLINIC | Age: 53
End: 2023-02-13

## 2023-02-13 LAB
BACTERIA BLD CULT: NORMAL
BACTERIA BLD CULT: NORMAL

## 2023-02-13 NOTE — TELEPHONE ENCOUNTER
Patient states she was supposed to have an EMG lower extremity done but when she called to schedule it, the order was ? Can she please have a new order for this  She will call central scheduling to reschedule

## 2023-02-13 NOTE — TELEPHONE ENCOUNTER
Please call central scheduling to see what they can do with this, as the EMG lab use the order for EMG to document that they did not do the test and sent her to the emergency room  As such, the order should still be valid, and they should still be able to do this

## 2023-02-13 NOTE — UTILIZATION REVIEW
NOTIFICATION OF ADMISSION DISCHARGE   This is a Notification of Discharge from [facility]  Please be advised that this patient has been discharge from our facility  Below you will find the admission and discharge date and time including the patient’s disposition  UTILIZATION REVIEW CONTACT:  General Mode  Utilization   Network Utilization Review Department  Phone: [phone] x carefully listen to the prompts  All voicemails are confidential   Email: [email or web address]     ADMISSION INFORMATION  PRESENTATION DATE: 2/8/2023  3:43 PM  OBERVATION ADMISSION DATE:   INPATIENT ADMISSION DATE: 2/8/23  6:57 PM   DISCHARGE DATE: 2/10/2023  6:14 PM   DISPOSITION:Home/Self Care    IMPORTANT INFORMATION:  Send all requests for admission clinical reviews, approved or denied determinations and any other requests to dedicated fax number below belonging to the campus where the patient is receiving treatment   List of dedicated fax numbers:  [address] DENIALS (Administrative/Medical Necessity) [phone]   [address] (Maternity/NICU/Pediatrics) [phone]   [facility] [phone]   [facility] [address] [phone]   [facility] [address] [phone]   [address] [facility] [phone]   [address] [facility] [phone]   [address] [place] [address] 119 [phone]   [facility]  [phone] [address] [facility] [phone]   [address] [facility] [address] [facility] [phone]

## 2023-02-15 ENCOUNTER — TELEPHONE (OUTPATIENT)
Dept: FAMILY MEDICINE CLINIC | Facility: CLINIC | Age: 53
End: 2023-02-15

## 2023-02-15 ENCOUNTER — HOSPITAL ENCOUNTER (OUTPATIENT)
Dept: MRI IMAGING | Facility: HOSPITAL | Age: 53
Discharge: HOME | End: 2023-02-15

## 2023-02-15 DIAGNOSIS — M21.371 FOOT DROP, RIGHT: ICD-10-CM

## 2023-02-15 DIAGNOSIS — M48.062 SPINAL STENOSIS OF LUMBAR REGION WITH NEUROGENIC CLAUDICATION: ICD-10-CM

## 2023-02-15 DIAGNOSIS — M51.37 DDD (DEGENERATIVE DISC DISEASE), LUMBOSACRAL: ICD-10-CM

## 2023-02-15 NOTE — TELEPHONE ENCOUNTER
TH, Pt is requesting a refill for her Xanax as well as her Seroquil, Pt states her [facility] doctor is no longer there and she states that you told her that she could get refills from you, May we refill

## 2023-02-16 ENCOUNTER — TELEPHONE (OUTPATIENT)
Dept: FAMILY MEDICINE CLINIC | Facility: CLINIC | Age: 53
End: 2023-02-16

## 2023-02-16 ENCOUNTER — TELEPHONE (OUTPATIENT)
Dept: PSYCHIATRY | Facility: CLINIC | Age: 53
End: 2023-02-16

## 2023-02-16 NOTE — TELEPHONE ENCOUNTER
I addended the psychiatry note that was mentioning that we have been providing her prescriptions with more details to see if they would follow-up with the patient that they started on medication

## 2023-02-16 NOTE — TELEPHONE ENCOUNTER
Dr Taylor Hernandez please review new message,  I did fwd another message in regards to this earlier

## 2023-02-16 NOTE — TELEPHONE ENCOUNTER
Patient's first episode of admission to the hospital quite a while ago was when she overused morphine for pain control prior to surgery  When that happened, she fell and hit her head, and was admitted to the hospital for approximately a month  Most recently, her admission to the hospital was prompted by substance, i e  alcohol and Xanax together  Again, based on these, it is inappropriate for primary care to take care of this particular medication issue, and she should follow with psychiatry  As far as the Seroquel is concerned, I do see that there is a request to psychiatry to fill that medication, but as of now, it has not been signed  I am adding Psychiatry to this conversation so they can weigh in, and possibly take ove after recent hospitalization

## 2023-02-16 NOTE — TELEPHONE ENCOUNTER
Call from Liban Ramirez requesting refill on her Seroquel and her Xanax  Informed her that medication has been prescribed by Dr Blossom Callaway not by our office  Since she has not been seen by new provider at our office yet, instructed her to call doctor who has been filling it for her

## 2023-02-16 NOTE — TELEPHONE ENCOUNTER
Patient has been following with psychiatry  Her last visit was July 2022  She saw a [facility] psychiatry provider  As far as the Xanax is concerned, I renewed that after she was in the hospital, and had been seen by a [facility] psychiatrist in the hospital   Based on that, I did continue it as it was very difficult for her to get in with seeing psychiatry  Based on this, the patient does as far as I can see have a relationship with psychiatry at [facility], and they should be taking care of her medication at this time

## 2023-02-16 NOTE — TELEPHONE ENCOUNTER
Patient called she needs a referral so she can get her medication through Psychiatry  The referral needs to to sent to [facility] at [facility]  She only had  A phone number for them [phone] I spoke to [facility] they are not taking any new referral from out of network  She must have a [facility] PCP   Spoke to patient she wanted to know if Dr Alina Agudelo would refill her Alprazolam 0 5 mg at bedtime and Quetiatine Fomarate   50 mg 1 at bed time called into [facility] [address] [place] [address]  Please call her at [phone] She is out of both medications

## 2023-02-16 NOTE — TELEPHONE ENCOUNTER
Spoke with pt , she verbalized being upset stating she has not had any substance use issue  Pt does not have appointment with psych until May  Unable to get medication refill for Seroquel     I did offer her an appointment to discuss things further with you but she declined

## 2023-02-16 NOTE — TELEPHONE ENCOUNTER
Lmom of number provided for pt to call the office, I did not leave the message due to the nature of Dr Abida Chiang response

## 2023-02-16 NOTE — TELEPHONE ENCOUNTER
Coordinated with Dr Ananya Napoles; at this time based on review of recent records I would not recommend continuing Xanax and cannot prescribe it as it was not started while she was under [facility] care  It is a low dose so there should be no concerns for withdrawal symptoms as it was prescribed on an as needed basis  Though for safety purposes if she were to feel shakiness, lightheaded, or develop increased blood pressure or confusion I recommend she call 911 or visit the ED for an evaluation  Could you also please add her to a cancellation list to have her seen sooner if any spots become available? We can refill the other medications Daniel was prescribing until her next appointment  Thank you

## 2023-02-16 NOTE — TELEPHONE ENCOUNTER
I will absolutely not refill her medications  With her recent admission and problems that she was having with substance use, I feel that would be completely inappropriate for family medicine to refill these medications  Further, she did have a request to psychiatry to fill the Seroquel

## 2023-02-21 ENCOUNTER — TELEPHONE (OUTPATIENT)
Dept: PSYCHIATRY | Facility: CLINIC | Age: 53
End: 2023-02-21

## 2023-02-21 DIAGNOSIS — F33.2 SEVERE EPISODE OF RECURRENT MAJOR DEPRESSIVE DISORDER, WITHOUT PSYCHOTIC FEATURES (HCC): ICD-10-CM

## 2023-02-21 RX ORDER — VENLAFAXINE HYDROCHLORIDE 37.5 MG/1
187.5 CAPSULE, EXTENDED RELEASE ORAL DAILY
Qty: 150 CAPSULE | Refills: 0 | Status: SHIPPED | OUTPATIENT
Start: 2023-02-21 | End: 2023-03-23

## 2023-02-21 RX ORDER — QUETIAPINE FUMARATE 50 MG/1
50 TABLET, FILM COATED ORAL
Qty: 30 TABLET | Refills: 0 | Status: SHIPPED | OUTPATIENT
Start: 2023-02-21 | End: 2023-03-23

## 2023-02-21 NOTE — TELEPHONE ENCOUNTER
Kaiser Mendoza used to see [facility]  She is requesting refills on her Effexor and her Seroquel  Scheduled for a [facility] appointment with Dr Shlomo Mahoney on 5/18/23

## 2023-02-24 ENCOUNTER — TELEPHONE (OUTPATIENT)
Dept: PSYCHIATRY | Facility: CLINIC | Age: 53
End: 2023-02-24

## 2023-02-24 NOTE — TELEPHONE ENCOUNTER
LM on Nuris's VM that we don't write for Tramadol as it is a pain medication  Instructed her to call pain specialist or PCP

## 2023-02-24 NOTE — TELEPHONE ENCOUNTER
Patient called into office and left a message stating that they were a patient on [facility], and that they needed a refill of TRAMADOL that was written by provider  Office does not see an active order for the requested medication  Office will route message to Office Nurse as well as the new provider patient is scheduled to see for further clarification

## 2023-03-06 ENCOUNTER — OFFICE VISIT (OUTPATIENT)
Dept: NEUROSURGERY | Facility: CLINIC | Age: 53
End: 2023-03-06

## 2023-03-06 VITALS
BODY MASS INDEX: 18.49 KG/M2 | SYSTOLIC BLOOD PRESSURE: 122 MMHG | HEART RATE: 106 BPM | DIASTOLIC BLOOD PRESSURE: 82 MMHG | RESPIRATION RATE: 18 BRPM | HEIGHT: 67 IN | OXYGEN SATURATION: 94 % | TEMPERATURE: 98.6 F | WEIGHT: 117.8 LBS

## 2023-03-06 DIAGNOSIS — M48.062 SPINAL STENOSIS OF LUMBAR REGION WITH NEUROGENIC CLAUDICATION: ICD-10-CM

## 2023-03-06 DIAGNOSIS — M21.371 FOOT DROP, RIGHT: Primary | ICD-10-CM

## 2023-03-06 RX ORDER — TRAMADOL HYDROCHLORIDE 50 MG/1
TABLET ORAL
COMMUNITY
Start: 2023-02-20

## 2023-03-06 NOTE — PATIENT INSTRUCTIONS
Consider B complex vitamin -take as per labeling   Consider discussion w/ Dr Lashanda Sexton or PCP for folic acid 1mg prescription strength      Folic acid OTC   0 8 mcg   Consider discussion w/ Dr Lashanda Sexton to optimize gabapentin dosing ---to MDD 3600 mg

## 2023-03-06 NOTE — PROGRESS NOTES
Assessment/Plan:    Foot drop, right  · Arrived not wearing AFO  · Right foot drop persist    · As per record review of 2/8/23 -2/10/23 She did not complete ordered EMG of RLE , drop foot validate etiology  2/8/23 patient arrived , after falling in the parking lot, she was not awake, did not follow commands   epoted patient had intermittent episodes of LOC, se was sent to the ED   reported she was normal at home when she left for appointment, ENG was rescheduled  Patient reported to ED staff she was worried about the pain during EMG  So took alcohol and xanax  When she was getting OO care fell on hand ad knees   She was hypoglycemic  Reported uncontrolled pain   Was taking tramadol and oxycodone  · DX encephalopathy requiring intubation,  Cause cobined use of xanax and alcohol  · Hospital Dr   notified her OP pain management Dr Jenna Cota regarding uncontrolled pain , patient has a scheduled appointment  Patient was angry he notified this Dr Neil Nunn wanted to speak w/ his supervisor  PLAN  Reinforced need to wear AFO, reports it is uncomfortable   EMG is rescheduled for 9/25/2023 , asked if patient wanted o cancel , she reported will let me know  Spinal stenosis of lumbar region with neurogenic claudication  · As addressedin HPI  · Gait instability secondary to right drop foot   · Denies saddle anesthesia or B /B dysfunction  8/2/22-DXA Based on the [facility] classification, this study is normal and the patient is considered at low risk for fracture  A daily intake of calcium of at least 1200 mg and vitamin D, 800-1000 IU, as well as weight bearing and muscle strengthening exercise, fall prevention and avoidance of tobacco and excessive alcohol intake as basic preventive measures are recommended    · Smoking --reports when  met with Dr Flor Mann 9/2022 was smoking 1 PPD , is not smoking 6-8/day   · Is not treating with calcium or vitamin         Imagining  · 2/15/22 MRI wo contrast lumbar spine Spondylotic degenerative changes result in severe right foraminal narrowing at L4-5 and L5-S1  Correlate for right L4 or right L5 radiculopathy  Degenerative changes are seen at remaining levels resulting in mild to moderate central stenosis at L3-4 and no greater than mild central or foraminal narrowing elsewhere  Rightward convex scoliosis apex L2  Modic type II degenerative marrow signal L5-S1  Modic type I degenerative marrow signal L2-3 and L4-5  · Reviewed in collaboration with Dr Derick Schwartz ---he advises patient continue with Dr Lenard Trevino as his surgical plan , it is most appropriate for her degenerative changes with scoliosis  This was communicated to the patient  · Patient self referred to Dr Derick Schwartz (after several visits with Dr Lenard Trevino ) reason for last visit 2/6/23      PLAN  · Reviewed MRI lumbar imagining   · Reviewed DXA, Advised to discuss with PCP DXA report and need for treatment with Vitamin D and CA ++  · Patient request a f/u appointment in 1 month with Dr Lenard Trevino , no longer , she is in severe pain not appropriately managed on current regimen, has significantly decreased smoking   · Recommend she discuss with Dr Malia Rollins , PM practitioner , adjusting higher dose of Gabapentin , current dose  300 mgTID or ordering a different membrane stabilizer   Her main complaint is neuropathic symptoms in the RLE  · Explained the opiate is not efficacious for neuropathic pain , she is currently treating with tramadol       Subjective: RTO for f/u visit for imagining review and diagnostics     Patient ID: Sabina Maher is a 45 y o  female     HPI   This is a 52-year-old female well known to our service presenting for follow-up visit for her chronic right-sided low back and lower extremity pain  Last seen in the office 9/12/22 w/ DR Lenard Trevino , surgery was offered L3 -S1 fusion w  Interbodies at L4-L5 and L5-S1, however  She is and active smoker , 1 PPD   She was advised will not perform fusion surgery secondary to smoking, She was advised on  tobacco cessation at all prior visits      She returns today with the main/dominate pain is in the right chin , right lower leg into the foot dorsi and planter aspect and the toes  She described neuropathic symptoms in the right chin and foot  She insist her symptoms are not coming for her back   She finally disclosed information regarding her back pain which is intermittent right sided and a localized area of pain in her mid right buttock into posterior right mid thigh, does not go below knee  Riight lower leg mid calf to lower ankel inro the foot and toes       Reports since a fall OOB around the end October 2022 beginning November pain in chin into right ankle wrap around ankle into foot on the top and bottom  Had 2 hospital admission for right foot problem     Symptom Severity  --10/10 was so bad yesterday remained I bed all day  Rubbing lidocaine on the foot      Quality of symptoms reported as -poked with needles constant , intermittent sharp jab in the foot , constant tingling, rarely numbness , shooting from bottom to top of foot , slashing with a knife along chin bone,right foot swelling (subjective), extreme coldness of the foot   shooting pain up into chin from foot followed by sensation of ice water running up the leg  Brief episodes of coldness in right foot        Aggravating factors reported as walking,  weight bearing on right leg         Relieving factors reported as lidocaine cream on foot all day yesterday pain was so severe  Wrapping in heating pads       Multimodel treatments   • PM --Dr Abhishek Pichardo comprehensive pain specialist ---last vist --2 weeks ago got injection in spine, back injection in back multiple times last year   • PT ---last summer was last physical therapy [facility] ---fr back jimbo , was not efficacious   HEP provided has not been doing   • Medicinal --diclofenac , gabapentin  Lidocaine ointment , meloxicam, Nucyntal is ordered but not approved by insurance, Effexor ---MDD, tramadol ---  2 tabs 3 x per da ---recently stopped use was not efficacious will discuss w/ Dr Liliana Hoskins on Wednesday   -No acetaminophen  Nicotine --use smoke continues  Daily little less than one pack per day     REVIEW OF SYSTEMS  Review of Systems   Constitutional: Positive for activity change (decreased)  HENT: Negative  Eyes: Negative  Respiratory: Negative  Cardiovascular: Negative  Gastrointestinal: Negative  Endocrine: Negative  Genitourinary: Negative  Musculoskeletal: Positive for back pain (intermittent in the low back into right hip down right leg to all toes shooting burningpain), gait problem (recently fell no injuries did not hit heafd or back) and myalgias (pain in the lower right leg, ankle and foot tight right calf muscle)  Skin: Negative  Allergic/Immunologic: Negative  Neurological: Positive for weakness (right leg and foot drop) and numbness (middle lower back and buttocks to right leg and knee patient states nothing new)  Hematological: Bruises/bleeds easily  Psychiatric/Behavioral: Positive for sleep disturbance (trouble falling asleep and staying asleep due to pain)  All other systems reviewed and are negative          Meds/Allergies     Current Outpatient Medications   Medication Sig Dispense Refill   • gabapentin (NEURONTIN) 300 mg capsule Take 1 capsule (300 mg total) by mouth 4 (four) times a day 360 capsule 1   • lidocaine (LMX) 4 % cream Apply topically as needed for moderate pain 30 g 0   • ondansetron (Zofran ODT) 8 mg disintegrating tablet Take 1 tablet (8 mg total) by mouth every 8 (eight) hours as needed for nausea or vomiting 20 tablet 0   • QUEtiapine (SEROquel) 50 mg tablet Take 1 tablet (50 mg total) by mouth daily at bedtime 30 tablet 0   • traMADol (ULTRAM) 50 mg tablet      • venlafaxine (EFFEXOR-XR) 37 5 mg 24 hr capsule Take 5 capsules (187 5 mg total) by mouth daily Pt takes one a day 150 capsule 0   • ALPRAZolam Evelyn Tre) 0 5 mg tablet Take 1 tablet (0 5 mg total) by mouth daily at bedtime as needed for anxiety or sleep for up to 15 days (Patient not taking: Reported on 3/6/2023) 15 tablet 0   • Diclofenac Sodium (VOLTAREN) 1 % Apply 2 g topically 4 (four) times a day (Patient not taking: Reported on 3/6/2023) 150 g 2   • meloxicam (MOBIC) 15 mg tablet TAKE 1 TABLET BY MOUTH ONCE DAILY (Patient not taking: Reported on 3/6/2023) 30 tablet 0   • nicotine (NICODERM CQ) 14 mg/24hr TD 24 hr patch Place 1 patch on the skin daily Do not start before 2022  (Patient not taking: Reported on 3/6/2023) 28 patch 0   • saccharomyces boulardii (FLORASTOR) 250 mg capsule Take 1 capsule (250 mg total) by mouth 2 (two) times a day (Patient not taking: Reported on 3/6/2023) 180 capsule 3     No current facility-administered medications for this visit         Allergies   Allergen Reactions   • Chantix [Varenicline]    • Ibuprofen Other (See Comments)     Upset stomach   • Lyrica [Pregabalin] Other (See Comments)     bruising   • Penicillins Other (See Comments)     ? hives   • Sulfa Antibiotics Other (See Comments)     sloughing skin in mouth   • Sulfasalazine        PAST HISTORY    Past Medical History:   Diagnosis Date   • ARF (acute renal failure) ([facility] [address] ) 2022   • Aspiration pneumonitis ([facility] [address] ) 10/1/2022   • Bacteremia due to methicillin susceptible Staphylococcus aureus (MSSA) 2022   • Chronic pain disorder    • Depression    • GERD (gastroesophageal reflux disease)    • History of electroconvulsive therapy    • Localized swelling of right upper extremity 2022   • Low back pain    • Self-injurious behavior    • Suicide attempt [facility])        Past Surgical History:   Procedure Laterality Date   •  SECTION     • COLONOSCOPY     • PANCREAS SURGERY      "pseudocysts" per patient's  Ricki Infante   • [place] ESOPHAGOGASTRODUODENOSCOPY TRANSORAL DIAGNOSTIC N/A 4/10/2018    Procedure: EGD AND COLONOSCOPY;  Surgeon: Sakshi Thrasher Reza Hansen MD;  [place] [facility]; Service: Gastroenterology       Social History     Tobacco Use   • Smoking status: Every Day     Packs/day: 0 50     Years: 35 00     Pack years: 17 50     Types: Cigarettes   • Smokeless tobacco: Never   Vaping Use   • Vaping Use: Never used   Substance Use Topics   • Alcohol use: Not Currently     Comment: "occasional glass of wine"   • Drug use: Yes     Types: Marijuana, Cocaine     Comment: medical, MARIJUANA       Family History   Problem Relation Age of Onset   • Arthritis Mother    • Coronary artery disease Mother         MI in her 63's   • Parkinsonism Father         with falls and SDH   • Parkinsonism Paternal Grandmother    • Coronary artery disease Paternal Grandfather    • Parkinsonism Paternal Aunt    • Colon cancer Family    • Depression Neg Hx        The following portions of the patient's history were reviewed and updated as appropriate: allergies, current medications, past family history, past medical history, past social history, past surgical history and problem list       EXAM    Vitals:Blood pressure 122/82, pulse (!) 106, temperature 98 6 °F (37 °C), resp  rate 18, height 5' 7" (1 702 m), weight 53 4 kg (117 lb 12 8 oz), last menstrual period 03/14/2019, SpO2 94 %  ,Body mass index is 18 45 kg/m²  Physical Exam  Vitals and nursing note reviewed  Exam conducted with a chaperone present ()  Constitutional:       Appearance: She is normal weight  Comments: Underweight BMI 18 45   HENT:      Head: Normocephalic and atraumatic  Pulmonary:      Effort: Pulmonary effort is normal  No respiratory distress  Musculoskeletal:      Right lower leg: No edema  Left lower leg: No edema  Comments: Pain with flexion extension   Skin:     General: Skin is warm and dry  Neurological:      Mental Status: She is alert and oriented to person, place, and time  Sensory: Sensory deficit present  Motor: Weakness present        Gait: Gait abnormal       Deep Tendon Reflexes: Reflexes abnormal       Reflex Scores:       Patellar reflexes are 2+ on the right side and 2+ on the left side  Achilles reflexes are 0 on the right side and 0 on the left side  Psychiatric:         Mood and Affect: Mood normal          Behavior: Behavior normal          Neurologic Exam     Mental Status   Oriented to person, place, and time  Level of consciousness: alert    Motor Exam     Strength   Right iliopsoas: 5/5  Left iliopsoas: 5/5  Right quadriceps: 5/5  Left quadriceps: 5/5  Right anterior tibial: 0/5  Left anterior tibial: 5/5  Right gastroc: 0/5  Left gastroc: 5/5    Sensory Exam   Right leg light touch: decreased from ankle  Left leg light touch: normal    Gait, Coordination, and Reflexes     Gait  Gait: (limp right foot drop )    Reflexes   Right patellar: 2+  Left patellar: 2+  Right achilles: 0  Left achilles: 0  Right ankle clonus: absent  Left ankle clonus: absent      Imaging Studies  MRI lumbar spine without contrast  Result Date: 2/17/2023  Narrative: MRI LUMBAR SPINE WITHOUT CONTRAST INDICATION: M51 37: Other intervertebral disc degeneration, lumbosacral region M48 062: Spinal stenosis, lumbar region with neurogenic claudication M21 371: Foot drop, right foot  Progressive worsening of chronic pain  Neuropathic pain in right lower extremity and foot  Right foot drop  COMPARISON:  Outside MRI March 10, 2022 TECHNIQUE:  Multiplanar, multisequence imaging of the lumbar spine was performed     IMAGE QUALITY:  Diagnostic     FINDINGS: VERTEBRAL BODIES:  There are 5 lumbar type vertebral bodies  Rightward convex scoliosis apex L2  Modic type II degenerative marrow signal L5-S1  Modic type I degenerative marrow signal L2-3 and L4-5  SACRUM:  Normal signal within the sacrum  No evidence of insufficiency or stress fracture  DISTAL CORD AND CONUS:  Normal size and signal within the distal cord and conus   PARASPINAL SOFT TISSUES:  Paraspinal soft tissues are unremarkable  LOWER THORACIC DISC SPACES:  Normal disc height and signal   No disc herniation, canal stenosis or foraminal narrowing  LUMBAR DISC SPACES: L1-L2:  Mild annular bulge with small marginal osteophytes  Minimal central stenosis without foraminal narrowing  L2-L3:  Mild facet hypertrophy bilaterally with mild annular bulge results in mild right foraminal stenosis  Minimal central stenosis  L3-L4:  Diffuse annular bulge with marginal osteophytes result in mild bilateral foraminal narrowing and mild to moderate central stenosis  Prominent fat dorsally in the epidural space contributes to central stenosis  L4-L5:  Mild facet hypertrophy  Diffuse annular bulging is identified eccentric to the right resulting in severe right foraminal narrowing  Correlate for right L4 radiculopathy  Mild central stenosis  L5-S1:  Moderate right greater than left facet hypertrophy bilaterally  Disc osteophyte complex and marginal osteophyte eccentric to the right results in severe right foraminal narrowing  Correlate for right L5 radiculopathy  OTHER FINDINGS:  None  Impression: Spondylotic degenerative changes result in severe right foraminal narrowing at L4-5 and L5-S1  Correlate for right L4 or right L5 radiculopathy  Degenerative changes are seen at remaining levels resulting in mild to moderate central stenosis at L3-4 and no greater than mild central or foraminal narrowing elsewhere  Workstation performed: [id number]     EMG 2 limb lower extremity  Result Date: 2/8/2023  Narrative: Casey Stapleton MD     2/8/2023  3:38 PM Patient came to the EMG lab for evaluation of lower extremity pain and paresthesias  She had a fall in the parking lot, did not hit her head  However, on arrival, patient was not awake, would open eyes to verbal stimuli, however did not follow any commands   According to , patient has been having these intermittent episodes of loss of consciousness, she was sent to emergency room  She was normal at home when they left to come here, and was fine up until she had the fall in the parking lot  We will reschedule her EMG when she is discharged from the hospital and medically stable  Was admitted 2/8- 2/10/22             8/2/22-DXA Based on the [facility] classification, this study is normal and the patient is considered at low risk for fracture  2  A daily intake of calcium of at least 1200 mg and vitamin D, 800-1000 IU, as well as weight bearing and muscle strengthening exercise, fall prevention and avoidance of tobacco and excessive alcohol intake as basic preventive measures are recommended          I have personally reviewed pertinent reports  and I have personally reviewed pertinent films in PACS    I have spent a total time of 45 minutes on 03/06/23 in caring for this patient including Diagnostic results, Prognosis, Risks and benefits of tx options, Instructions for management, Patient and family education, Importance of tx compliance, Risk factor reductions, Impressions, Counseling / Coordination of care, Documenting in the medical record, Reviewing / ordering tests, medicine, procedures  , Obtaining or reviewing history   and Communicating with other healthcare professionals

## 2023-03-07 NOTE — ASSESSMENT & PLAN NOTE
· Arrived not wearing AFO  · Right foot drop persist    · As per record review of 2/8/23 -2/10/23 She did not complete ordered EMG of RLE , drop foot validate etiology  2/8/23 patient arrived , after falling in the parking lot, she was not awake, did not follow commands   epoted patient had intermittent episodes of LOC, se was sent to the ED   reported she was normal at home when she left for appointment, ENG was rescheduled  Patient reported to ED staff she was worried about the pain during EMG  So took alcohol and xanax  When she was getting OO care fell on hand ad knees   She was hypoglycemic  Reported uncontrolled pain   Was taking tramadol and oxycodone  · DX encephalopathy requiring intubation,  Cause cobined use of xanax and alcohol  · Hospital Dr   notified her OP pain management Dr Gloria Lockhart regarding uncontrolled pain , patient has a scheduled appointment  Patient was angry he notified this Dr Gail Garvey wanted to speak w/ his supervisor  PLAN  Reinforced need to wear AFO, reports it is uncomfortable   EMG is rescheduled for 9/25/2023 , asked if patient wanted o cancel , she reported will let me know

## 2023-03-07 NOTE — ASSESSMENT & PLAN NOTE
· As addressedin HPI  · Gait instability secondary to right drop foot   · Denies saddle anesthesia or B /B dysfunction  8/2/22-DXA Based on the [facility] classification, this study is normal and the patient is considered at low risk for fracture  A daily intake of calcium of at least 1200 mg and vitamin D, 800-1000 IU, as well as weight bearing and muscle strengthening exercise, fall prevention and avoidance of tobacco and excessive alcohol intake as basic preventive measures are recommended  · Smoking --reports when  met with Dr Raymond Gallagher 9/2022 was smoking 1 PPD , is not smoking 6-8/day   · Is not treating with calcium or vitamin         Imagining  · 2/15/22 MRI wo contrast lumbar spine Spondylotic degenerative changes result in severe right foraminal narrowing at L4-5 and L5-S1  Correlate for right L4 or right L5 radiculopathy  Degenerative changes are seen at remaining levels resulting in mild to moderate central stenosis at L3-4 and no greater than mild central or foraminal narrowing elsewhere  Rightward convex scoliosis apex L2  Modic type II degenerative marrow signal L5-S1  Modic type I degenerative marrow signal L2-3 and L4-5  · Reviewed in collaboration with Dr Benny Bae ---he advises patient continue with Dr Raymond aGllagher as his surgical plan , it is most appropriate for her degenerative changes with scoliosis  This was communicated to the patient  · Patient self referred to Dr Benny Bae (after several visits with Dr Raymond Gallagher ) reason for last visit 2/6/23      PLAN  · Reviewed MRI lumbar imagining   · Reviewed DXA, Advised to discuss with PCP DXA report and need for treatment with Vitamin D and CA ++    · Patient request a f/u appointment in 1 month with Dr Raymond Gallagher , no longer , she is in severe pain not appropriately managed on current regimen, has significantly decreased smoking   · Recommend she discuss with Dr Sabrina Kunz , PM practitioner , adjusting higher dose of Gabapentin , current dose  300 mgTID or ordering a different membrane stabilizer   Her main complaint is neuropathic symptoms in the RLE  · Explained the opiate is not efficacious for neuropathic pain , she is currently treating with tramadol

## 2023-03-10 NOTE — PROGRESS NOTES
PM&R Consult Follow Up Note  Jojo Sloan 45 y o  female MRN: [id number]  Unit/Bed#: [facility] 68 2 -01 Encounter: [id number]     Assessment and Recommendations:  Ms Jojo Sloan is a 45 y o  female with medical history of depression, previous SA, hx of ECT, anxiety, chronic back pain with lumbar radiculopathy, opioid use, GERD, IBS, HTN, HLD, cocaine use, ETOH use and tobacco use who presented to [facility] on 9/19 with acute onset of confusion with fall  She was found to have ANUJA, rhabdo, transaminitis, hyponatremia, and MSSA bacteremia (felt to be cutaneous source - prior IV site with erythema)  TTE negative, CHERYLE held due to respiratory status  Placed on IV cefazolin through 10/25  Remained encephalopathic, MRI was sub-optimal, and patient refused EEG  LP was performed and negative for infection, and she was also treated for Wernicke's  Toxic-Metabolic Encephalopathy  - Improving   MSSA Bacteremia - likely cutaneous source  CHERYLE was unable to be safely performed during this hospitalization  LP negative  On abx through 10/25  Hypokalemia  Hypomagnesemia  Anemia - Stable  Polysubstance use disorder  Chronic pain with chronic opioid use  GERD   IBS  HTN  HLD  Depression w/ history of SA, ECT  Mild tachycardia  Noted to have Extrapyramidal type movements/dystonic movements in her head, arms, and legs  Impaired divided/sustained attention  Impaired orientation despite multiple choice cuing  Impaired insight/judgment  Recommendations:  - Continue PT/OT  -  Has cooperative and participatory  - Likely can be managed at a general rehab or a brain injury rehab  - Per team, plan is Good Pepe  - Can tolerate 3-5 hours, has medical need as documented above  - Typically independent  Spouse is supportive  Lives in a [facility] with 4 BARAK with one flight to 2nd floor  Thank you for allowing the PM&R service to participate in the care of Ms Jojo Sloan  We will continue to follow she progress with you  Please do not hesitate to call with questions or concerns  Last Seen: 9/15/2022    Interval History/Subjective:   Since last seen, MRI under sedation performed and showed severe periventricular white matter T2 hyperintensities  Per Neuro, findings can be associated with toxic-encephalopathy  Neuro recommended repeat MRI Brain w/wo in 3 months with outpatient f/u with Neurology  ANUJA has resolved  She is eating breakfast  Denies any headache, lightheadedness, blurry/double vision, CP, SOB, fevers, chills, N/V  Last BM 10/17 and continent  ROS: A 10 point review of systems was negative except for what is noted in the HPI  Physical Therapy: did not participate yesterday  Per OT note, Sup-Nicolette bed mobility, Sup transfers, Nicolette 130' ambulation with HHA      Occupational Therapy: Nicolette grooming, modA LB dressing    Speech Therapy: Tolerating reg/thins  Vital Signs:      Temp:  [96 7 °F (35 9 °C)-99 4 °F (37 4 °C)] 96 7 °F (35 9 °C)  HR:  [] 113  Resp:  [20] 20  BP: (121-128)/(76-80) 121/76   Intake/Output Summary (Last 24 hours) at 10/18/2022 0853  Last data filed at 10/17/2022 2247  Gross per 24 hour   Intake 50 ml   Output --   Net 50 ml        Gen: No acute distress,  Thin  HEENT: Moist mucus membranes, Normocephalic/Atraumatic  Cardiovascular: Tachycardic  Heme/Extr: No edema  Pulmonary: Non-labored breathing  Lungs CTAB  : No abel  GI: Soft, non-tender, non-distended  Integumentary: Skin is warm, dry   Neuro: AAOx1-2 - requires multiple choice cuing  Markedly impaired attention (sustained/divided), impaired processing, impaired judgment/insight  Cooperative and pleasant though  Answers simple questions  Noted to have dystonic movements of head/neck, and extremities - EPS-like  Strength at least 4/5 throughout  Psych: Normal mood and affect       Laboratory:  Reviewed    Lab Results   Component Value Date    HGB 10 3 (L) 10/18/2022    HGB 16 7 (H) 03/11/2014    HCT 32 5 (L) 10/18/2022 WBC 9 10 10/18/2022     Lab Results   Component Value Date    BUN 12 10/18/2022    K 3 3 (L) 10/18/2022     10/18/2022    GLUCOSE 76 06/25/2017    CREATININE 1 02 10/18/2022     Lab Results   Component Value Date    PROTIME 13 5 09/23/2022    INR 1 03 09/23/2022        Imaging (reviewed):  10/14 MRI Brain:  No acute infarct seen     Development of severe periventricular and white matter T2 hyperintensities in the supratentorial region and in the lavelle without any significant mass effect, enhancement or hemorrhage,Correlate with toxic encephalopathy    Follow-up at 3 months suggested   to demonstrate resolution     No acute hemorrhage seen     No enhancing intraparenchymal lesion seen Yes

## 2023-03-21 DIAGNOSIS — F33.2 SEVERE EPISODE OF RECURRENT MAJOR DEPRESSIVE DISORDER, WITHOUT PSYCHOTIC FEATURES (HCC): ICD-10-CM

## 2023-03-22 RX ORDER — QUETIAPINE FUMARATE 50 MG/1
50 TABLET, FILM COATED ORAL
Qty: 30 TABLET | Refills: 0 | Status: SHIPPED | OUTPATIENT
Start: 2023-03-22 | End: 2023-04-21

## 2023-03-22 NOTE — TELEPHONE ENCOUNTER
Chart reviewed   Refill was sent to the patient's preferred pharmacy, covering patient's primary psychiatrist, Dr Glenroy Morris

## 2023-03-23 DIAGNOSIS — F33.2 SEVERE EPISODE OF RECURRENT MAJOR DEPRESSIVE DISORDER, WITHOUT PSYCHOTIC FEATURES (HCC): ICD-10-CM

## 2023-03-23 RX ORDER — VENLAFAXINE HYDROCHLORIDE 37.5 MG/1
187.5 CAPSULE, EXTENDED RELEASE ORAL DAILY
Qty: 150 CAPSULE | Refills: 0 | Status: SHIPPED | OUTPATIENT
Start: 2023-03-23 | End: 2023-04-22

## 2023-03-23 NOTE — TELEPHONE ENCOUNTER
Chart reviewed  Refill was sent to the patient's preferred pharmacy, covering patient's primary psychiatrist, Dr Daryl Gutierrez

## 2023-03-31 ENCOUNTER — TELEPHONE (OUTPATIENT)
Dept: RHEUMATOLOGY | Facility: CLINIC | Age: 53
End: 2023-03-31

## 2023-03-31 ENCOUNTER — HOSPITAL ENCOUNTER (OUTPATIENT)
Dept: RADIOLOGY | Facility: HOSPITAL | Age: 53
Discharge: HOME | End: 2023-03-31
Attending: INTERNAL MEDICINE

## 2023-03-31 DIAGNOSIS — R76.8 CYCLIC CITRULLINATED PEPTIDE (CCP) ANTIBODY POSITIVE: ICD-10-CM

## 2023-03-31 DIAGNOSIS — M79.671 RIGHT FOOT PAIN: ICD-10-CM

## 2023-03-31 NOTE — TELEPHONE ENCOUNTER
Called pt and left deatiled vm  Can sched with Mauro Pellet  PLEASE contact Trula Every to schedule

## 2023-03-31 NOTE — TELEPHONE ENCOUNTER
----- Message from Yvonne Rasmussen PA-C sent at 3/31/2023 11:46 AM EDT -----  Pt needs appt to discuss results of ultrasound of the hands  Looks like she has a Arnie appt August  is she a new patient? Don't see any previous appts with our practice        She can see me next available   ----- Message -----  From: Interface, Radiology Results In  Sent: 3/31/2023  10:45 AM EDT  To: Marissa Moulton MD

## 2023-04-05 ENCOUNTER — TELEPHONE (OUTPATIENT)
Dept: NEUROSURGERY | Facility: CLINIC | Age: 53
End: 2023-04-05

## 2023-04-05 ENCOUNTER — OFFICE VISIT (OUTPATIENT)
Dept: NEUROSURGERY | Facility: CLINIC | Age: 53
End: 2023-04-05

## 2023-04-05 VITALS
DIASTOLIC BLOOD PRESSURE: 86 MMHG | OXYGEN SATURATION: 97 % | BODY MASS INDEX: 19.15 KG/M2 | HEART RATE: 108 BPM | WEIGHT: 122 LBS | HEIGHT: 67 IN | SYSTOLIC BLOOD PRESSURE: 144 MMHG

## 2023-04-05 DIAGNOSIS — G57.51 TARSAL TUNNEL SYNDROME OF RIGHT SIDE: ICD-10-CM

## 2023-04-05 DIAGNOSIS — M21.371 FOOT DROP, RIGHT: Primary | ICD-10-CM

## 2023-04-05 DIAGNOSIS — M48.061 FORAMINAL STENOSIS OF LUMBAR REGION: ICD-10-CM

## 2023-04-05 DIAGNOSIS — G57.31 PERONEAL NEUROPATHY, RIGHT: ICD-10-CM

## 2023-04-05 DIAGNOSIS — M54.16 LUMBAR RADICULOPATHY: ICD-10-CM

## 2023-04-05 NOTE — TELEPHONE ENCOUNTER
4/5/23 - PT CALLED REQ   LAST OFFICE NOT BE SENT TO DR Salina Huang @ [facility] TO REVIEW MEDS    3/6/23 OVN FAXED [phone]  PHONE #[phone]

## 2023-04-05 NOTE — PROGRESS NOTES
Office Note - Neurosurgery   Charity Simpson 45 y o  female MRN: [id number]      Assessment and Plan: This is a 47-year-old female with history of lumbar degenerative scoliosis and known lumbar foraminal stenosis presenting with new right foot weakness and severe right lower extremity pain  MRI of her lumbar spine was reviewed and compared to her prior imaging from 2022  There appears to be very minimal change overall noted at the right L4-5 foramina  She continues to have degenerative dextroscoliosis with severe right L4-5 and L5-S1 foraminal stenoses  I had a long discussion with the patient about her symptoms as well as imaging findings  The patient's imaging appears to be stable  Based on my physical exam, the patient had positive Tinel's sign at the tarsal tunnel and at the peroneal groove over the fibular  I believe the patient may be having peripheral nerve disease following her compartment syndrome from her rhabdomyolysis  For further evaluation, I would like to obtain EMGs of her lower extremities  I made them aware that she could have a combination of peripheral neuropathy superimposed on lumbar radiculopathy  I advised patient to reach out once the EMGs have been completed so we can determine if the patient requires any referral for further treatment  History, physical examination and diagnostic tests were reviewed and questions answered  Diagnosis, care plan and treatment options were discussed  The patient and spouse/SO understand instructions and will follow up as directed  Follow-up: After EMGs have been completed    Diagnoses and all orders for this visit:    Foot drop, right  -     EMG 2 limb lower extremity; Future    Lumbar radiculopathy  -     EMG 2 limb lower extremity; Future    Foraminal stenosis of lumbar region    Peroneal neuropathy, right  -     EMG 2 limb lower extremity;  Future    Tarsal tunnel syndrome of right side  -     EMG 2 limb lower extremity; Future        Subjective/Objective     Chief Complaint    Chronic low back pain and right foot pain and weakness    HPI    This is a 22-year-old female with history of lumbar degenerative scoliosis with chronic low back pain presenting with persistent pain and new right foot weakness  The patient's history is well detailed in our prior clinic visit notes  My last clinic visit with the patient was in September 2022  Surgery was recommended at the time however the patient was not a good surgical candidate due to significant smoking history  At that time, the patient decided to seek second opinion and I was in agreement with that  Shortly after our clinic visit, the patient was admitted to the hospital for rhabdomyolysis and renal failure  They are not sure what caused the rhabdo however her right lower extremity was most significantly affected  They stated the patient had severe swelling in his right leg from the knee to her foot  They were not given any clear diagnosis  As the swelling resolved, the patient noted persistent discoloration of her foot and also right foot weakness  In addition, the patient has been experiencing severe pain at the bottom of her foot specifically in the medial arch close to her big toe and second toe  She states the pain has been severe and debilitating preventing her from being able to walk  Anytime she applies any pressure she experiences severe pain  She initially was diagnosed with gout however was told her issue was not due to gout  She also reports having severe pain at the top of her foot  The pain started shortly after the bottom of her foot pain began  The pain at the top of her foot radiates up to the side of her leg up to the knee  It is associated with numbness on the side of her leg  She also reports having weakness and moving her right foot  The patient has undergone extensive rehabilitation without any improvement      MARY HERNANDEZ personally reviewed and updated  Review of Systems   Constitutional: Positive for activity change (decreased)  HENT: Negative  Eyes: Negative  Respiratory: Negative  Cardiovascular: Negative  Gastrointestinal: Negative  Endocrine: Negative  Genitourinary: Negative  Musculoskeletal: Positive for back pain (center to right low back into right buttock deep muscle ache ( right mid thigh with numbness, no pain)/ all toes shooting burning pain due to nerve damage in right lower leg), gait problem (trouble walking due to right foot) and myalgias (lower right ankle and foot tight right calf muscle)  Burning sensation form right foot up to mid shin   Skin: Positive for color change (right foot)  Allergic/Immunologic: Negative  Neurological: Positive for weakness (right leg and foot drop) and numbness (middle lower back and buttocks to right mid thigh)  Hematological: Does not bruise/bleed easily  Psychiatric/Behavioral: Positive for sleep disturbance (trouble falling asleep and staying asleep due to pain)         Family History    Family History   Problem Relation Age of Onset   • Arthritis Mother    • Coronary artery disease Mother         MI in her 63's   • Parkinsonism Father         with falls and SDH   • Parkinsonism Paternal Grandmother    • Coronary artery disease Paternal Grandfather    • Parkinsonism Paternal Aunt    • Colon cancer Family    • Depression Neg Hx        Social History    Social History     Socioeconomic History   • Marital status: /Civil Union     Spouse name: Not on file   • Number of children: Not on file   • Years of education: Not on file   • Highest education level: Not on file   Occupational History   • Occupation: disability   Tobacco Use   • Smoking status: Every Day     Packs/day: 0 50     Years: 35 00     Pack years: 17 50     Types: Cigarettes   • Smokeless tobacco: Never   Vaping Use   • Vaping Use: Never used   Substance and Sexual Activity   • Alcohol use: Not "Currently     Comment: \"occasional glass of wine\"   • Drug use: Yes     Types: Marijuana, Cocaine     Comment: medical, MARIJUANA   • Sexual activity: Yes     Partners: Male     Birth control/protection: Post-menopausal     Comment: PT is    Other Topics Concern   • Not on file   Social History Narrative    Lives with spouse     Social Determinants of Health     Financial Resource Strain: Not on file   Food Insecurity: No Food Insecurity   • Worried About Running Out of Food in the Last Year: Never true   • Ran Out of Food in the Last Year: Never true   Transportation Needs: No Transportation Needs   • Lack of Transportation (Medical): No   • Lack of Transportation (Non-Medical): No   Physical Activity: Not on file   Stress: Not on file   Social Connections: Not on file   Intimate Partner Violence: Not on file   Housing Stability: Low Risk    • Unable to Pay for Housing in the Last Year: No   • Number of Places Lived in the Last Year: 1   • Unstable Housing in the Last Year: No       Past Medical History    Past Medical History:   Diagnosis Date   • ARF (acute renal failure) ([place] [address] ) 2022   • Aspiration pneumonitis ([place] [address] ) 10/1/2022   • Bacteremia due to methicillin susceptible Staphylococcus aureus (MSSA) 2022   • Chronic pain disorder    • Depression    • GERD (gastroesophageal reflux disease)    • History of electroconvulsive therapy    • Localized swelling of right upper extremity 2022   • Low back pain    • Self-injurious behavior    • Suicide attempt [facility])        Surgical History    Past Surgical History:   Procedure Laterality Date   •  SECTION     • COLONOSCOPY     • PANCREAS SURGERY      \"pseudocysts\" per patient's  Ricki Infante   • OR ESOPHAGOGASTRODUODENOSCOPY TRANSORAL DIAGNOSTIC N/A 4/10/2018    Procedure: EGD AND COLONOSCOPY;  Surgeon: Mitesh Dennison MD;  [place] [facility];   Service: Gastroenterology       Medications      Current Outpatient Medications:   •  ALPRAZolam " [place]) 0 5 mg tablet, Take 1 tablet (0 5 mg total) by mouth daily at bedtime as needed for anxiety or sleep for up to 15 days (Patient not taking: Reported on 3/6/2023), Disp: 15 tablet, Rfl: 0  •  Diclofenac Sodium (VOLTAREN) 1 %, Apply 2 g topically 4 (four) times a day (Patient not taking: Reported on 3/6/2023), Disp: 150 g, Rfl: 2  •  gabapentin (NEURONTIN) 300 mg capsule, Take 1 capsule (300 mg total) by mouth 4 (four) times a day, Disp: 360 capsule, Rfl: 1  •  lidocaine (LMX) 4 % cream, Apply topically as needed for moderate pain, Disp: 30 g, Rfl: 0  •  meloxicam (MOBIC) 15 mg tablet, TAKE 1 TABLET BY MOUTH ONCE DAILY (Patient not taking: Reported on 3/6/2023), Disp: 30 tablet, Rfl: 0  •  nicotine (NICODERM CQ) 14 mg/24hr TD 24 hr patch, Place 1 patch on the skin daily Do not start before November 8, 2022   (Patient not taking: Reported on 3/6/2023), Disp: 28 patch, Rfl: 0  •  ondansetron (Zofran ODT) 8 mg disintegrating tablet, Take 1 tablet (8 mg total) by mouth every 8 (eight) hours as needed for nausea or vomiting, Disp: 20 tablet, Rfl: 0  •  QUEtiapine (SEROquel) 50 mg tablet, Take 1 tablet (50 mg total) by mouth daily at bedtime, Disp: 30 tablet, Rfl: 0  •  saccharomyces boulardii (FLORASTOR) 250 mg capsule, Take 1 capsule (250 mg total) by mouth 2 (two) times a day (Patient not taking: Reported on 3/6/2023), Disp: 180 capsule, Rfl: 3  •  traMADol (ULTRAM) 50 mg tablet, , Disp: , Rfl:   •  venlafaxine (EFFEXOR-XR) 37 5 mg 24 hr capsule, Take 5 capsules (187 5 mg total) by mouth daily Pt takes one a day, Disp: 150 capsule, Rfl: 0    Allergies    Allergies   Allergen Reactions   • Chantix [Varenicline]    • Ibuprofen Other (See Comments)     Upset stomach   • Lyrica [Pregabalin] Other (See Comments)     bruising   • Penicillins Other (See Comments)     ? hives   • Sulfa Antibiotics Other (See Comments)     sloughing skin in mouth   • Sulfasalazine        The following portions of the patient's history were reviewed and updated as appropriate: allergies, current medications, past family history, past medical history, past social history, past surgical history and problem list     Investigations    I personally reviewed the MRI results with the patient:      Physical Exam    Vitals:  Last menstrual period 03/14/2019 ,There is no height or weight on file to calculate BMI      General:  Normal, well developed, not in distress/pain     Skin:   No issues, no rashes noted     Musculoskeletal:   2/5 in right dorsiflexion and EHL  3/5 in right eversion  5/5 strength throughout all other muscle groups  No tenderness to palpation of the spine  Reproducible pain in the plantar aspect of her foot with Tinel's tapping of the tarsal tunnel  Reproducible pain in lateral leg and foot with Tinel's tapping of the peroneal nerve     Neurologic Exam:  Awake and alert  Oriented x3  Speech clear and fluent  ATKINSON   Decreased sensation on lateral leg  No vaughn's  No clonus  2+ patellar reflexes     Gait:   normal gait, normal posture

## 2023-04-07 NOTE — TELEPHONE ENCOUNTER
4/7/23- PT CALLED STATING [facility] OFFICE NEVER RECEIVED FAX  PER  LAST NOTE, IT WAS SENT OVER   REFAXED NOTE -[phone]

## 2023-05-16 DIAGNOSIS — F33.2 SEVERE EPISODE OF RECURRENT MAJOR DEPRESSIVE DISORDER, WITHOUT PSYCHOTIC FEATURES (HCC): ICD-10-CM

## 2023-05-16 RX ORDER — QUETIAPINE FUMARATE 50 MG/1
50 TABLET, FILM COATED ORAL
Qty: 30 TABLET | Refills: 0 | Status: SHIPPED | OUTPATIENT
Start: 2023-05-16 | End: 2023-05-18 | Stop reason: SDUPTHER

## 2023-05-16 RX ORDER — VENLAFAXINE HYDROCHLORIDE 37.5 MG/1
187.5 CAPSULE, EXTENDED RELEASE ORAL DAILY
Qty: 150 CAPSULE | Refills: 0 | Status: SHIPPED | OUTPATIENT
Start: 2023-05-16

## 2023-05-18 ENCOUNTER — OFFICE VISIT (OUTPATIENT)
Dept: PSYCHIATRY | Facility: CLINIC | Age: 53
End: 2023-05-18

## 2023-05-18 DIAGNOSIS — F41.1 GENERALIZED ANXIETY DISORDER: Primary | ICD-10-CM

## 2023-05-18 DIAGNOSIS — F33.2 SEVERE EPISODE OF RECURRENT MAJOR DEPRESSIVE DISORDER, WITHOUT PSYCHOTIC FEATURES (HCC): ICD-10-CM

## 2023-05-18 DIAGNOSIS — F10.11 ALCOHOL USE DISORDER, MILD, IN SUSTAINED REMISSION: ICD-10-CM

## 2023-05-18 RX ORDER — VENLAFAXINE HYDROCHLORIDE 75 MG/1
75 CAPSULE, EXTENDED RELEASE ORAL DAILY
Qty: 30 CAPSULE | Refills: 2 | Status: SHIPPED | OUTPATIENT
Start: 2023-05-18 | End: 2023-06-17

## 2023-05-18 RX ORDER — VENLAFAXINE HYDROCHLORIDE 150 MG/1
150 CAPSULE, EXTENDED RELEASE ORAL DAILY
Qty: 30 CAPSULE | Refills: 2 | Status: SHIPPED | OUTPATIENT
Start: 2023-05-18 | End: 2023-06-17

## 2023-05-18 RX ORDER — GABAPENTIN 400 MG/1
CAPSULE ORAL
COMMUNITY
Start: 2023-05-02

## 2023-05-18 RX ORDER — HYDROXYZINE PAMOATE 50 MG/1
50 CAPSULE ORAL 3 TIMES DAILY PRN
Qty: 30 CAPSULE | Refills: 2 | Status: SHIPPED | OUTPATIENT
Start: 2023-05-18

## 2023-05-18 RX ORDER — QUETIAPINE FUMARATE 50 MG/1
50 TABLET, FILM COATED ORAL
Qty: 30 TABLET | Refills: 2 | Status: SHIPPED | OUTPATIENT
Start: 2023-05-18

## 2023-05-18 NOTE — PSYCH
" [address] [facility]    Name and Date of Birth:  Jim Vila 45 y o  1970 MRN: [id number]    Date of Visit: May 18, 2023    Reason for visit: Initial psychiatric intake assessment    Chief complaint: \"I have a lot of anxiety right now\"  History of Present Illness (HPI):      Jim Vila is a 45 y o , female, possessing a previous psychiatric history significant for major depressive disorder, generalized anxiety disorder, medically complicated by IBS, COPD, hypertension, degenerative disc disease, presenting for intake assessment  May Roberson is a transfer services [facility]  She reports that she has struggled with anxiety for many years, stemming back to her early childhood  She reports that depression has had a fluctuating course throughout her lifetime, when it is at its worst it is characterized by feeling sad, down, misunderstood, increased isolation and frequent bouts of crying  She reports that this is better currently compared to it had been in the past although she is experiencing some increased depression due to numerous physical limitations related to a recent fall and nerve/back injury  She states that currently she has difficulty with sleep, problems initiating and maintaining sleep averaging 3 to 4 hours per night  Experiencing anhedonia and decreased interest in activities she once found pleasurable  Energy levels are decreased as well as poor concentration and decreased appetite  She has lost weight over the course of the past year or so, now around 120 pounds  She reports that anxiety levels have always been elevated through majority of her childhood and adult life although feels that they are \"through the roof\" presently  She describes that anxiety manifests for her as feeling tense, keyed up, restless, on edge  She describes feeling \"jittery\"    She finds that she frequently ruminates about stressors, " overthink situations and finds that this rumination greatly impacts her sleep and ability to initiate sleep  She used to have panic attacks in the past although recently has not experienced any of these  She has tried various benzodiazepine medications although recently was hospitalized due to a fall/metabolic encephalopathy and February 2023 due to combination of opioid and Xanax use  She is not taking any as needed medications presently for anxiety  She denies any suicidal or homicidal ideations  Denies any auditory or visual hallucinations at this time  Denies any symptoms of paranoia  No delusions reported  Obsessions related  No history of eating disorder reported  No PTSD symptoms noted  She feels that her current medication regimen is partially helpful although still struggling with sleep as well as significant anxiety symptoms      Current Rating Scores:     Current PHQ-9   PHQ-2/9 Depression Screening           Psychiatric Review Of Systems:    Appetite: decreased  Adverse eating: no  Weight changes: decreased  Insomnia/sleeplessness: decreased  Fatigue/anergy: decreased  Anhedonia/lack of interest: decreased  Attention/concentration: decreased  Psychomotor agitation/retardation: no  Somatic symptoms: no  Anxiety/panic attack: worrying  Dalia/hypomania: no  Hopelessness/helplessness/worthlessness: no  Self-injurious behavior/high-risk behavior: no  Suicidal ideation: no  Homicidal ideation: no  Auditory hallucinations: no  Visual hallucinations: no  Other perceptual disturbances: no  Delusional thinking: no  Obsessive/compulsive symptoms: no    Review Of Systems:    Constitutional negative   ENT negative   Cardiovascular negative   Respiratory negative   Gastrointestinal negative   Genitourinary negative   Musculoskeletal back pain and leg pain   Integumentary negative   Neurological neuropathic pain and paresthesias   Endocrine negative   Other Symptoms none, all other systems are negative Family Psychiatric History:     Family History   Problem Relation Age of Onset   • Arthritis Mother    • Coronary artery disease Mother         MI in her 63's   • Parkinsonism Father         with falls and SDH   • Parkinsonism Paternal Grandmother    • Coronary artery disease Paternal Grandfather    • Parkinsonism Paternal Aunt    • Colon cancer Family    • Depression Neg Hx        Past Psychiatric History:     Previous inpatient psychiatric admissions: 3 inpatient admissions total, most recent in 2018 at [address]  Previous inpatient/outpatient substance abuse rehabilitation: History of rehab many years ago for alcohol use  Present/previous outpatient psychiatric treatment: [facility]  Present/previous psychotherapy: Previously saw a therapist although not currently  History of suicidal attempts/gestures: History of multiple suicide attempts by overdose and cutting  History of violence/aggressive behaviors: None  Present/previous psychotropic medication use: Prozac, Zoloft, Paxil, Cymbalta, Elavil, Depakote, Tegretol, lithium, Ativan, Valium, Klonopin, Ambien, Lunesta, trazodone  She has been on Effexor for a long period of time and felt this was most helpful  Has also completed ECT 2018  Substance Abuse History:    History of heavy alcohol use, has been sober for 16 years  Benzodiazepine abuse  Occasional marijuana use  Tobacco use 1 pack/day  History of remote cocaine use during college years  Tenzin Deng does not apear under the influence or withdrawal of any psychoactive substance throughout today's examination  Social History:    Academic history: some college  Marital history:   Children: 1 adult daughter  Social support system:   Residential history: Resides in [facility]; lives with   Vocational History: unemployed  Access to firearms: denies direct access to weapons/firearms     Legal History: none    Traumatic History:     Abuse:none is reported "  Other Traumatic Events: raised by grandmother due to father being in [employer] and mother having to work    Past Medical History:    Past Medical History:   Diagnosis Date   • ARF (acute renal failure) ([facility] [address] ) 2022   • Aspiration pneumonitis ([facility] [address] ) 10/1/2022   • Bacteremia due to methicillin susceptible Staphylococcus aureus (MSSA) 2022   • Chronic pain disorder    • Depression    • GERD (gastroesophageal reflux disease)    • History of electroconvulsive therapy    • Localized swelling of right upper extremity 2022   • Low back pain    • Self-injurious behavior    • Suicide attempt [facility])         Past Surgical History:   Procedure Laterality Date   •  SECTION     • COLONOSCOPY     • PANCREAS SURGERY      \"pseudocysts\" per patient's  Ricki Infante   • [place] ESOPHAGOGASTRODUODENOSCOPY TRANSORAL DIAGNOSTIC N/A 4/10/2018    Procedure: EGD AND COLONOSCOPY;  Surgeon: Lupe Rogers MD;  [place] [facility]; Service: Gastroenterology     Allergies   Allergen Reactions   • Chantix [Varenicline]    • Ibuprofen Other (See Comments)     Upset stomach   • Lyrica [Pregabalin] Other (See Comments)     bruising   • Penicillins Other (See Comments)     ? hives   • Sulfa Antibiotics Other (See Comments)     sloughing skin in mouth   • Sulfasalazine        History Review: The following portions of the patient's history were reviewed and updated as appropriate: allergies, current medications, past family history, past medical history, past social history, past surgical history and problem list     OBJECTIVE:    Vital signs in last 24 hours: There were no vitals filed for this visit      Mental Status Evaluation:    Appearance age appropriate, casually dressed   Behavior cooperative, appears anxious   Speech normal rate, normal volume, normal pitch   Mood anxious   Affect constricted   Thought Processes organized, goal directed   Associations intact associations   Thought Content no overt delusions " Perceptual Disturbances: no auditory hallucinations, no visual hallucinations   Abnormal Thoughts  Risk Potential Suicidal ideation - None  Homicidal ideation - None  Potential for aggression - No   Orientation oriented to person, place, time/date and situation   Memory recent and remote memory grossly intact   Consciousness alert and awake   Attention Span Concentration Span attention span and concentration are age appropriate   Intellect appears to be of average intelligence   Insight intact   Judgement intact   Muscle Strength and  Gait normal muscle strength and normal muscle tone, normal gait and normal balance   Motor Activity no abnormal movements   Language no difficulty naming common objects, no difficulty repeating a phrase, no difficulty writing a sentence   Fund of Knowledge adequate knowledge of current events  adequate fund of knowledge regarding past history  adequate fund of knowledge regarding vocabulary    Pain none   Pain Scale 0       Laboratory Results: I have personally reviewed all pertinent laboratory/tests results    Recent Labs (last 4 months): Admission on 02/08/2023, Discharged on 02/10/2023   Component Date Value   • Ventricular Rate 02/08/2023 99    • Atrial Rate 02/08/2023 99    • AZ Interval 02/08/2023 130    • QRSD Interval 02/08/2023 86    • QT Interval 02/08/2023 380    • QTC Interval 02/08/2023 487    • P Axis 02/08/2023 72    • QRS Axis 02/08/2023 73    • T Wave Axis 02/08/2023 65    • ph, Kiko ISTAT 02/08/2023 7  382    • pCO2, Kiko i-STAT 02/08/2023 39 0 (L)    • pO2, Kiko i-STAT 02/08/2023 48 0 (H)    • BE, i-STAT 02/08/2023 -2    • HCO3, Kiko i-STAT 02/08/2023 23 1 (L)    • CO2, i-STAT 02/08/2023 24    • O2 Sat, i-STAT 02/08/2023 82    • SODIUM, I-STAT 02/08/2023 137    • Potassium, i-STAT 02/08/2023 3 8    • Calcium, Ionized i-STAT 02/08/2023 1 27    • Hct, i-STAT 02/08/2023 40    • Hgb, i-STAT 02/08/2023 13 6    • Glucose, i-STAT 02/08/2023 66    • Specimen Type 02/08/2023 VENOUS    • WBC 02/08/2023 10 12    • RBC 02/08/2023 4 12    • Hemoglobin 02/08/2023 12 4    • Hematocrit 02/08/2023 39 2    • MCV 02/08/2023 95    • MCH 02/08/2023 30 1    • MCHC 02/08/2023 31 6    • RDW 02/08/2023 16 4 (H)    • MPV 02/08/2023 9 3    • Platelets 98/95/5612 288    • nRBC 02/08/2023 0    • Neutrophils Relative 02/08/2023 67    • Immat GRANS % 02/08/2023 0    • Lymphocytes Relative 02/08/2023 24    • Monocytes Relative 02/08/2023 7    • Eosinophils Relative 02/08/2023 1    • Basophils Relative 02/08/2023 1    • Neutrophils Absolute 02/08/2023 6 77    • Immature Grans Absolute 02/08/2023 0 03    • Lymphocytes Absolute 02/08/2023 2 46    • Monocytes Absolute 02/08/2023 0 75    • Eosinophils Absolute 02/08/2023 0 05    • Basophils Absolute 02/08/2023 0 06    • Sodium 02/08/2023 139    • Potassium 02/08/2023 3 9    • Chloride 02/08/2023 113 (H)    • CO2 02/08/2023 22    • ANION GAP 02/08/2023 4    • BUN 02/08/2023 8    • Creatinine 02/08/2023 0 69    • Glucose 02/08/2023 192 (H)    • Calcium 02/08/2023 7 5 (L)    • Corrected Calcium 02/08/2023 8 6    • AST 02/08/2023 74 (H)    • ALT 02/08/2023 304 (H)    • Alkaline Phosphatase 02/08/2023 133 (H)    • Total Protein 02/08/2023 4 7 (L)    • Albumin 02/08/2023 2 6 (L)    • Total Bilirubin 02/08/2023 0 40    • eGFR 02/08/2023 99    • hs TnI 0hr 02/08/2023 3    • D-Dimer, Quant 02/08/2023 1 02 (H)    • Ammonia 02/08/2023 104 (H)    • pH, Kiko 02/08/2023 7 221 (L)    • pCO2, Kiko 02/08/2023 53 6 (H)    • pO2, Kiko 02/08/2023 196 5 (H)    • HCO3, Kiko 02/08/2023 21 5 (L)    • Base Excess, Kiko 02/08/2023 -6 4    • O2 Content, Kiko 02/08/2023 15 2    • O2 HGB, VENOUS 02/08/2023 91 2 (H)    • Amph/Meth UR 02/08/2023 Negative    • Barbiturate Ur 02/08/2023 Negative    • Benzodiazepine Urine 02/08/2023 Negative    • Cocaine Urine 02/08/2023 Negative    • Methadone Urine 02/08/2023 Negative    • Opiate Urine 02/08/2023 Negative    • PCP Ur 02/08/2023 Negative    • THC Urine 02/08/2023 Negative    • Oxycodone Urine 02/08/2023 Negative    • LACTIC ACID 02/08/2023 0 9    • Color, UA 02/08/2023 Light Yellow    • Clarity, UA 02/08/2023 Turbid    • Specific Gravity, UA 02/08/2023 1 002 (L)    • pH, UA 02/08/2023 5 5    • Leukocytes, UA 02/08/2023 Large (A)    • Nitrite, UA 02/08/2023 Negative    • Protein, UA 02/08/2023 Negative    • Glucose, UA 02/08/2023 70 (7/100%) (A)    • Ketones, UA 02/08/2023 Negative    • Urobilinogen, UA 02/08/2023 <2 0    • Bilirubin, UA 02/08/2023 Negative    • Occult Blood, UA 02/08/2023 Negative    • Ethanol Lvl 28/37/7073 480 (H)    • Salicylate Lvl 75/31/6705 <3 (L)    • Acetaminophen Level 02/08/2023 2 (L)    • Blood Culture 02/08/2023 No Growth After 5 Days  • Blood Culture 02/08/2023 No Growth After 5 Days      • SARS-CoV-2 02/08/2023 Negative    • INFLUENZA A PCR 02/08/2023 Negative    • INFLUENZA B PCR 02/08/2023 Negative    • RSV PCR 02/08/2023 Negative    • POC Glucose 02/08/2023 191 (H)    • hs TnI 2hr 02/08/2023 4    • Delta 2hr hsTnI 02/08/2023 1    • hs TnI 4hr 02/08/2023 5    • Delta 4hr hsTnI 02/08/2023 2    • RBC, UA 02/08/2023 1-2    • WBC, UA 02/08/2023 10-20 (A)    • Epithelial Cells 02/08/2023 Occasional    • Bacteria, UA 02/08/2023 Occasional    • Urine Culture 02/08/2023 >100,000 cfu/ml Klebsiella aerogenes (A)    • Urine Culture 02/08/2023 >100,000 cfu/ml Escherichia coli (A)    • POC Glucose 02/08/2023 125    • POC Glucose 02/08/2023 148 (H)    • WBC 02/09/2023 6 67    • RBC 02/09/2023 4 25    • Hemoglobin 02/09/2023 12 9    • Hematocrit 02/09/2023 41 5    • MCV 02/09/2023 98    • MCH 02/09/2023 30 4    • MCHC 02/09/2023 31 1 (L)    • RDW 02/09/2023 16 3 (H)    • MPV 02/09/2023 9 3    • Platelets 46/16/8998 265    • nRBC 02/09/2023 0    • Neutrophils Relative 02/09/2023 75    • Immat GRANS % 02/09/2023 0    • Lymphocytes Relative 02/09/2023 17    • Monocytes Relative 02/09/2023 8    • Eosinophils Relative 02/09/2023 0    • Basophils Relative 02/09/2023 0    • Neutrophils Absolute 02/09/2023 4 97    • Immature Grans Absolute 02/09/2023 0 03    • Lymphocytes Absolute 02/09/2023 1 11    • Monocytes Absolute 02/09/2023 0 54    • Eosinophils Absolute 02/09/2023 0 01    • Basophils Absolute 02/09/2023 0 01    • Sodium 02/09/2023 139    • Potassium 02/09/2023 4 3    • Chloride 02/09/2023 110 (H)    • CO2 02/09/2023 24    • ANION GAP 02/09/2023 5    • BUN 02/09/2023 7    • Creatinine 02/09/2023 0 81    • Glucose 02/09/2023 128    • Calcium 02/09/2023 8 6    • Corrected Calcium 02/09/2023 9 3    • AST 02/09/2023 58 (H)    • ALT 02/09/2023 330 (H)    • Alkaline Phosphatase 02/09/2023 162 (H)    • Total Protein 02/09/2023 6 0 (L)    • Albumin 02/09/2023 3 1 (L)    • Total Bilirubin 02/09/2023 0 40    • eGFR 02/09/2023 82    • Ammonia 02/09/2023 35    • Magnesium 02/09/2023 1 5 (L)    • Phosphorus 02/09/2023 3 6    • Hepatitis B Surface Ag 02/09/2023 Non-reactive    • Hep A IgM 02/09/2023 Non-reactive    • Hepatitis C Ab 02/09/2023 Non-reactive    • Hep B C IgM 02/09/2023 Non-reactive    • POC Glucose 02/08/2023 55 (L)    • POC Glucose 02/09/2023 252 (H)    • Hemoglobin 02/09/2023 11 8    • Hematocrit 02/09/2023 37 7    • POC Glucose 02/09/2023 147 (H)    • Sodium 02/10/2023 134 (L)    • Potassium 02/10/2023 3 5    • Chloride 02/10/2023 101    • CO2 02/10/2023 28    • ANION GAP 02/10/2023 5    • BUN 02/10/2023 6    • Creatinine 02/10/2023 0 55 (L)    • Glucose 02/10/2023 126    • Calcium 02/10/2023 9 1    • Corrected Calcium 02/10/2023 9 7    • AST 02/10/2023 76 (H)    • ALT 02/10/2023 262 (H)    • Alkaline Phosphatase 02/10/2023 192 (H)    • Total Protein 02/10/2023 6 5    • Albumin 02/10/2023 3 3 (L)    • Total Bilirubin 02/10/2023 0 68    • eGFR 02/10/2023 106    • WBC 02/10/2023 7 59    • RBC 02/10/2023 4 30    • Hemoglobin 02/10/2023 13 1    • Hematocrit 02/10/2023 41 0    • MCV 02/10/2023 95    • MCH 02/10/2023 30 5    • MCHC 02/10/2023 32 0    • RDW 02/10/2023 16 1 (H)    • Platelets 21/29/0184 248    • MPV 02/10/2023 9 6    • Magnesium 02/10/2023 1 6    • POC Glucose 02/10/2023 113        Suicide/Homicide Risk Assessment:    The following interventions are recommended: no intervention changes needed  Although patient's acute lethality risk is low, long-term/chronic lethality risk is mildly elevated in the presence of MDD, ALVIN  At the current moment, Shauna Enriquez is future-oriented, forward-thinking, and demonstrates ability to act in a self-preserving manner as evidenced by volitionally presenting to the clinic today, seeking treatment  To mitigate future risk, patient should adhere to the recommendations of this writer, avoid alcohol/illicit substance use, utilize community-based resources and familiar support and prioritize mental health treatment  Presently, patient denies suicidal/homicidal ideation in addition to thoughts of self-injury; contracts for safety, see below for risk assessment  At conclusion of evaluation, patient is amenable to the recommendations of this writer including: therapy, medications  Also, patient is amenable to calling/contacting the outpatient office including this writer if any acute adverse effects of their medication regimen arise in addition to any comments or concerns pertaining to their psychiatric management  Patient is amenable to calling/contacting crisis and/or attending to the nearest emergency department if their clinical condition deteriorates to assure their safety and stability, stating that they are able to appropriately confide in their spouse regarding their psychiatric state  Assessment/Plan:     Marinda Galeazzi is a 53-year-old female who carries a past psychiatric history significant for generalized anxiety disorder and major depressive disorder  She presents to establish care in his transfer of services from [facility]    She has a long history of depression and anxiety stemming back to childhood, fluctuating throughout her lifetime and has trialed various antidepressant medications  She has been on Effexor for the longest period of time which seemingly has been helpful  There is a history of abuse, in the past heavily abusing alcohol although has been sober from this for 16 years  She has had some benzodiazepine abuse as well, more recently being admitted to the hospital for encephalopathy after an overdose on Xanax  She is struggling with sleep with anxiety symptoms at this time and feels that depression is mostly stable  Her anxiety seems to stem from numerous medical conditions , most recently neuropathy and chronic back pain which seems to be limiting her physical mobility  5/18/23 -worsening anxiety, mostly stemming from an upcoming EMG  She requests benzos initially although we discussed potential for abuse  She will try increased dose of Effexor to 225 mg and as needed Vistaril  In the future consider adjusting Effexor to Cymbalta for neuropathic pain  We will also consider a TCA if needed - perhaps nortriptyline  DSM-5 Diagnoses:     • Major depressive disorder, recurrent, moderate  • Generalized anxiety disorder  • Alcohol use disorder in sustained remission      Treatment Recommendations/Precautions:    • Kirsten Effexor to 225 mg daily for depression and anxiety  • Continue Seroquel 50 mg daily for depression augmentation and insomnia  • Vistaril 50 mg as needed for anxiety    • Does not want to see a therapist  • Aware of need to follow up with family physician for medical issues  • Aware of 24 hour and weekend coverage for urgent situations accessed by calling [facility] main practice number    Medications Risks/Benefits:      Risks, Benefits And Possible Side Effects Of Medications:    Risks, benefits, and possible side effects of medications explained to Yoseph including risk of cardiovascular events in elderly related to treatment with antipsychotic medications and risk of suicidality and serotonin syndrome related to treatment with antidepressants  She verbalizes understanding and agreement for treatment       Controlled Medication Discussion:     Not applicable    Treatment Plan:    Completed and signed during the session: Yes - with Jeannine Rodriguez    This note was not shared with the patient due to reasonable likelihood of causing patient harm    Visit Time    Visit Start Time: 2:30PM  Visit Stop Time: 3:15PM  Total Visit Duration: 45 minutes    Psychotherapy Provided:     Individual psychotherapy provided: Yes  Counseling was provided during the session today for 40 minutes  Recent stressor including family issues, marital problems, medical problems, chronic pain, everyday stressors, occasional anxiety and chronic anxiety discussed with Jeannine Rodriguez  Coping strategies reviewed with Jeannine Rodriguez  Reassurance and supportive therapy provided          Bear Lambert MD 05/18/23

## 2023-05-18 NOTE — BH TREATMENT PLAN
"TREATMENT PLAN (Medication Management Only)        [facility]    Name and Date of Birth:  Zane Henson 45 y o  1970  Date of Treatment Plan: May 18, 2023  Diagnosis/Diagnoses:    1  Generalized anxiety disorder    2  Severe episode of recurrent major depressive disorder, without psychotic features [facility])      Strengths/Personal Resources for Self-Care: \"my \"  Area/Areas of need (in own words): anxiety, depression  1  Long Term Goal: continue improvement in anxiety  Target Date:6 months - 11/18/2023  Person/Persons responsible for completion of goal: Tammy Fisher  2  Short Term Objective (s) - How will we reach this goal?:   A  Provider new recommended medication/dosage changes and/or continue medication(s): continue current medications as prescribed  B  Attend medication management appointments regularly  C  \"get on the right medications and back into therapy\"  Target Date:6 months - 11/18/2023  Person/Persons Responsible for Completion of Goal: Tammy Fisher  Progress Towards Goals: continuing treatment  Treatment Modality: medication management every 3 months  Review due 180 days from date of this plan: 6 months - 11/18/2023  Expected length of service: maintenance  My Physician/PA/NP and I have developed this plan together and I agree to work on the goals and objectives  I understand the treatment goals that were developed for my treatment      "

## 2023-05-24 ENCOUNTER — HOSPITAL ENCOUNTER (OUTPATIENT)
Dept: NEUROLOGY | Facility: CLINIC | Age: 53
Discharge: HOME | End: 2023-05-24

## 2023-05-24 DIAGNOSIS — G57.31 PERONEAL NEUROPATHY, RIGHT: ICD-10-CM

## 2023-05-24 DIAGNOSIS — M54.16 LUMBAR RADICULOPATHY: ICD-10-CM

## 2023-05-24 DIAGNOSIS — M21.371 FOOT DROP, RIGHT: ICD-10-CM

## 2023-05-24 DIAGNOSIS — G57.51 TARSAL TUNNEL SYNDROME OF RIGHT SIDE: ICD-10-CM

## 2023-05-25 ENCOUNTER — TELEPHONE (OUTPATIENT)
Dept: NEUROSURGERY | Facility: CLINIC | Age: 53
End: 2023-05-25

## 2023-05-25 NOTE — TELEPHONE ENCOUNTER
Received a call from [place] asking how long it would take for Dr Marylin Rodriguez to review EMG completed today  Assisted to schedule return visit to review the results  She was appreciative

## 2023-05-30 ENCOUNTER — TELEPHONE (OUTPATIENT)
Dept: PSYCHIATRY | Facility: CLINIC | Age: 53
End: 2023-05-30

## 2023-05-30 NOTE — TELEPHONE ENCOUNTER
LM on Nuris's VM that I was returning her call to address her medication concerns  Teams number provided for call back

## 2023-05-30 NOTE — TELEPHONE ENCOUNTER
Return call from John Sal  States she has been taking Vistaril for about 10 days now and it is not helping her  States she is having anxiety as well as not being able to sleep at night  She is requesting that provider review for medication change  Will refer to Dr Alexei Kang for review

## 2023-05-31 ENCOUNTER — TELEPHONE (OUTPATIENT)
Dept: PSYCHIATRY | Facility: CLINIC | Age: 53
End: 2023-05-31

## 2023-05-31 DIAGNOSIS — F41.1 GENERALIZED ANXIETY DISORDER: ICD-10-CM

## 2023-05-31 RX ORDER — HYDROXYZINE PAMOATE 50 MG/1
100 CAPSULE ORAL 2 TIMES DAILY PRN
Qty: 30 CAPSULE | Refills: 2 | Status: SHIPPED | OUTPATIENT
Start: 2023-05-31

## 2023-05-31 NOTE — TELEPHONE ENCOUNTER
Nabor navarro said the pharmacy needs a new script for the increase on Vistaril from 50 to 100  Pharmacy filled old script

## 2023-05-31 NOTE — TELEPHONE ENCOUNTER
Called William Groves back and informed her that Dr Veronica Duffy will not prescribe a new medication at this time  She can however increase her Vistaril to 100 MG BID PRN for anxiety  Discussed utilizing dose at bedtime to help with sleep  She would like to try dose increase and is requesting script be sent to pharmacy  Will refer to Dr Veronica Duffy for review

## 2023-06-01 ENCOUNTER — OFFICE VISIT (OUTPATIENT)
Dept: NEUROSURGERY | Facility: CLINIC | Age: 53
End: 2023-06-01

## 2023-06-01 VITALS
SYSTOLIC BLOOD PRESSURE: 148 MMHG | DIASTOLIC BLOOD PRESSURE: 90 MMHG | HEART RATE: 100 BPM | WEIGHT: 122 LBS | OXYGEN SATURATION: 94 % | HEIGHT: 67 IN | RESPIRATION RATE: 16 BRPM | BODY MASS INDEX: 19.15 KG/M2 | TEMPERATURE: 97.2 F

## 2023-06-01 DIAGNOSIS — G57.31 PERONEAL NEUROPATHY, RIGHT: ICD-10-CM

## 2023-06-01 DIAGNOSIS — M48.061 FORAMINAL STENOSIS OF LUMBAR REGION: ICD-10-CM

## 2023-06-01 DIAGNOSIS — M21.371 FOOT DROP, RIGHT: Primary | ICD-10-CM

## 2023-06-01 NOTE — PROGRESS NOTES
Office Note - Neurosurgery   Tomas Jin 45 y o  female MRN: [id number]      Assessment and Plan: This is a 40-year-old female with history of lumbar degenerative disc disease and scoliosis and more recent right lower extremity pain, paresthesias and numbness  MRI of her lumbar spine again was reviewed with the patient which demonstrates dextroscoliosis in the lumbar region with significant degeneration of the L4-5 and L5-S1 levels with resultant severe right foraminal stenosis  EMG of her lower extremities demonstrate no lumbar radiculopathy or neuropathy  The patient however does have findings most consistent with a chronic common peroneal neuropathy in the right lower extremity, distal to the innervation to short head of biceps femoris muscle, with evidence of some reinnervation and mild ongoing denervation  Sural and tibial motor responses in the right lower extremity were lower compared to the left, these findings along with this patient's history of rhabdomyolysis could suggest a sciatic nerve involvement, however needle examination was normal from the hamstrings and the tibial nerve innervated muscles  Clinically, patient does not have any weakness in the plantar flexors and inverters  I had a long discussion with the patient about her symptoms as well as imaging findings  I believe the patient has peroneal neuropathy and is developing RDS/CRPS in her right foot given the severe sensitivity even to wearing shoes  We discussed treatment options  I believe the patient may benefit from a referral to a peripheral nerve specialist for evaluation  In regards to her severe pain, I will be given the patient a referral to pain management for spinal cord stimulator trial   Unfortunately we do not have anyone in our network who does peripheral nerve  I will be doing some research elsewhere to see if there is anyone that the patient can be referred to      From my standpoint, she does not require any treatment in regards to her lumbar spine disease until all of the other issues have been sorted out  I will see her on appearing basis  History, physical examination and diagnostic tests were reviewed and questions answered  Diagnosis, care plan and treatment options were discussed  The patient and spouse/SO understand instructions and will follow up as directed  Follow-up: As needed    Diagnoses and all orders for this visit:    Foot drop, right  -     Ambulatory Referral to Pain Management; Future    Peroneal neuropathy, right  -     Ambulatory Referral to Pain Management; Future    Foraminal stenosis of lumbar region  -     Ambulatory Referral to Pain Management; Future        Subjective/Objective     Chief Complaint    Right foot drop and pain    HPI    This is a 70-year-old female with history of chronic lumbar degenerative disease and scoliosis, and recent right lower extremity rhabdomyolysis with a right foot drop and right lower extremity pain presenting for follow-up visit  The patient's history is well detailed in our prior clinic visit notes  During our last clinic visit, EMG was ordered and the patient has completed the test and she is here for review of the results and further management  The patient states her symptoms persists  She continues to have severe right foot pain primarily at the bottom of her foot along with numbness on the dorsum of her foot as well as burning numbness on the lateral aspect of her leg  She states the swelling and the discoloration of her foot has improved although it does happen from time to time  She also reports persistent foot drop  Since her last visit, she has had trouble putting her shoes on  She states it is very uncomfortable to walk with shoes on and now only walks around with socks  With the socks she continues to have pain in her foot when she walks however its not as bad as with shoes on    The patient saw pain management and was placed on tramadol  She has been on gabapentin and the dosage for this was increased  ROS  MARY personally reviewed and updated  Review of Systems   Constitutional: Positive for activity change (decreased)  HENT: Negative  Eyes: Negative  Respiratory: Negative  Cardiovascular: Negative  Gastrointestinal: Negative  Endocrine: Negative  Genitourinary: Negative  Musculoskeletal: Positive for back pain (center to right low back into right buttock deep muscle ache ( right mid thigh with numbness, no pain)/ all toes shooting burning pain due to nerve damage in right lower leg), gait problem (trouble walking due to right foot) and myalgias (lower right ankle and foot tight right calf muscle)  Burning sensation form right foot up to mid shin   Skin: Positive for color change (right foot)  Allergic/Immunologic: Negative  Neurological: Positive for weakness (right leg and foot drop) and numbness (middle lower back and buttocks to right mid thigh)  Hematological: Does not bruise/bleed easily  Psychiatric/Behavioral: Positive for sleep disturbance (trouble falling asleep and staying asleep due to pain)         Family History    Family History   Problem Relation Age of Onset   • Arthritis Mother    • Coronary artery disease Mother         MI in her 63's   • Parkinsonism Father         with falls and SDH   • Parkinsonism Paternal Grandmother    • Coronary artery disease Paternal Grandfather    • Parkinsonism Paternal Aunt    • Colon cancer Family    • Depression Neg Hx        Social History    Social History     Socioeconomic History   • Marital status: /Civil Union     Spouse name: Not on file   • Number of children: Not on file   • Years of education: Not on file   • Highest education level: Not on file   Occupational History   • Occupation: disability   Tobacco Use   • Smoking status: Every Day     Packs/day: 0 50     Years: 35 00     Total pack years: 17 50     Types: Cigarettes   • "Smokeless tobacco: Never   Vaping Use   • Vaping Use: Never used   Substance and Sexual Activity   • Alcohol use: Not Currently     Comment: \"occasional glass of wine\"   • Drug use: Yes     Types: Marijuana, Cocaine     Comment: medical, MARIJUANA   • Sexual activity: Yes     Partners: Male     Birth control/protection: Post-menopausal     Comment: PT is    Other Topics Concern   • Not on file   Social History Narrative    Lives with spouse     Social Determinants of Health     Financial Resource Strain: Not on file   Food Insecurity: No Food Insecurity (2023)    Hunger Vital Sign    • Worried About Running Out of Food in the Last Year: Never true    • Ran Out of Food in the Last Year: Never true   Transportation Needs: No Transportation Needs (2023)    PRAPARE - Transportation    • Lack of Transportation (Medical): No    • Lack of Transportation (Non-Medical):  No   Physical Activity: Not on file   Stress: Not on file   Social Connections: Not on file   Intimate Partner Violence: Not on file   Housing Stability: Low Risk  (2023)    Housing Stability Vital Sign    • Unable to Pay for Housing in the Last Year: No    • Number of Places Lived in the Last Year: 1    • Unstable Housing in the Last Year: No       Past Medical History    Past Medical History:   Diagnosis Date   • [facility] ) 2022   • Aspiration pneumonitis ([facility] ) 10/1/2022   • Bacteremia due to methicillin susceptible Staphylococcus aureus (MSSA) 2022   • Chronic pain disorder    • Depression    • GERD (gastroesophageal reflux disease)    • History of electroconvulsive therapy    • Localized swelling of right upper extremity 2022   • Low back pain    • Self-injurious behavior    • Suicide attempt [facility])        Surgical History    Past Surgical History:   Procedure Laterality Date   •  SECTION     • COLONOSCOPY     • PANCREAS SURGERY      \"pseudocysts\" per patient's  Ricki Olson   • CO " ESOPHAGOGASTRODUODENOSCOPY TRANSORAL DIAGNOSTIC N/A 4/10/2018    Procedure: EGD AND COLONOSCOPY;  Surgeon: Familia Jimenez MD;  [place] [facility];   Service: Gastroenterology       Medications      Current Outpatient Medications:   •  gabapentin (NEURONTIN) 400 mg capsule, , Disp: , Rfl:   •  hydrOXYzine pamoate (VISTARIL) 50 mg capsule, Take 2 capsules (100 mg total) by mouth 2 (two) times a day as needed for anxiety, Disp: 30 capsule, Rfl: 2  •  lidocaine (LMX) 4 % cream, Apply topically as needed for moderate pain, Disp: 30 g, Rfl: 0  •  naloxone (NARCAN) 4 mg/0 1 mL nasal spray, , Disp: , Rfl:   •  QUEtiapine (SEROquel) 50 mg tablet, Take 1 tablet (50 mg total) by mouth daily at bedtime, Disp: 30 tablet, Rfl: 2  •  traMADol (ULTRAM) 50 mg tablet, , Disp: , Rfl:   •  venlafaxine (EFFEXOR-XR) 150 mg 24 hr capsule, Take 1 capsule (150 mg total) by mouth daily Take in combination with 75mg Effexor for total daily dose of (225mg), Disp: 30 capsule, Rfl: 2  •  venlafaxine (EFFEXOR-XR) 37 5 mg 24 hr capsule, Take 5 capsules (187 5 mg total) by mouth daily, Disp: 150 capsule, Rfl: 0  •  venlafaxine (EFFEXOR-XR) 75 mg 24 hr capsule, Take 1 capsule (75 mg total) by mouth daily Take in combination with 150mg Effexor for total daily dose of (225mg), Disp: 30 capsule, Rfl: 2    Allergies    Allergies   Allergen Reactions   • Chantix [Varenicline]    • Ibuprofen Other (See Comments)     Upset stomach   • Lyrica [Pregabalin] Other (See Comments)     bruising   • Penicillins Other (See Comments)     ? hives   • Sulfa Antibiotics Other (See Comments)     sloughing skin in mouth   • Sulfasalazine        The following portions of the patient's history were reviewed and updated as appropriate: allergies, current medications, past family history, past medical history, past social history, past surgical history and problem list     Investigations    I personally reviewed the MRI and EMG lower extremity results with the patient:      Physical Exam    Vitals:  Last menstrual period 03/14/2019 ,There is no height or weight on file to calculate BMI      General:  Normal, well developed, not in distress/pain     Skin:   No issues, no rashes noted     Musculoskeletal:   2/5 in right dorsiflexion and EHL  3/5 in right eversion  5/5 strength throughout all other muscle groups  No tenderness to palpation of the spine  No reproducible pain in the plantar aspect of her foot with Tinel's tapping of the tarsal tunnel  Reproducible numbness in lateral leg around the fibula head with Tinel's tapping of the peroneal nerve     Neurologic Exam:  Awake and alert  Oriented x3  Speech clear and fluent  ATKINSON   Decreased sensation on lateral leg  No vaughn's  No clonus  2+ patellar reflexes     Gait:   normal gait, normal posture

## 2023-06-06 ENCOUNTER — TELEPHONE (OUTPATIENT)
Dept: PAIN MEDICINE | Facility: MEDICAL CENTER | Age: 53
End: 2023-06-06

## 2023-06-06 NOTE — TELEPHONE ENCOUNTER
Caller: Nuris Infante     Doctor: Dr Gonzalez    Reason for call: Patient returning call     Call back#: [phone]

## 2023-06-06 NOTE — TELEPHONE ENCOUNTER
Caller: Nuris Infante     Doctor:  Dr Gonzalez     Reason for call: Patient calling asking if written note can be given stating why SCS Trial was denied by Dr Gonzalez please advise     Call back#: [phone]

## 2023-06-07 ENCOUNTER — APPOINTMENT (EMERGENCY)
Dept: CT IMAGING | Facility: HOSPITAL | Age: 53
End: 2023-06-07
Payer: COMMERCIAL

## 2023-06-07 ENCOUNTER — HOSPITAL ENCOUNTER (EMERGENCY)
Facility: HOSPITAL | Age: 53
Discharge: HOME | End: 2023-06-07
Attending: EMERGENCY MEDICINE
Payer: COMMERCIAL

## 2023-06-07 VITALS
HEART RATE: 90 BPM | DIASTOLIC BLOOD PRESSURE: 83 MMHG | SYSTOLIC BLOOD PRESSURE: 128 MMHG | OXYGEN SATURATION: 93 % | RESPIRATION RATE: 18 BRPM | TEMPERATURE: 97.6 F

## 2023-06-07 DIAGNOSIS — R91.8 ABNORMAL CT SCAN OF LUNG: ICD-10-CM
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
## 2023-06-07 DIAGNOSIS — S22.39XA RIB FRACTURE: ICD-10-CM

## 2023-06-07 LAB
ANION GAP SERPL CALCULATED.3IONS-SCNC: 6 MMOL/L (ref 4–13)
BASOPHILS # BLD AUTO: 0.08 THOUSANDS/ÂΜL (ref 0–0.1)
BASOPHILS NFR BLD AUTO: 1 % (ref 0–1)
BUN SERPL-MCNC: 11 MG/DL (ref 5–25)
CALCIUM SERPL-MCNC: 10 MG/DL (ref 8.4–10.2)
CHLORIDE SERPL-SCNC: 95 MMOL/L (ref 96–108)
CO2 SERPL-SCNC: 33 MMOL/L (ref 21–32)
CREAT SERPL-MCNC: 0.9 MG/DL (ref 0.6–1.3)
EOSINOPHIL # BLD AUTO: 0.06 THOUSAND/ÂΜL (ref 0–0.61)
EOSINOPHIL NFR BLD AUTO: 1 % (ref 0–6)
ERYTHROCYTE [DISTWIDTH] IN BLOOD BY AUTOMATED COUNT: 14.5 % (ref 11.6–15.1)
GFR SERPL CREATININE-BSD FRML MDRD: 72 ML/MIN/1.73SQ M
GLUCOSE SERPL-MCNC: 129 MG/DL (ref 65–140)
HCT VFR BLD AUTO: 45.1 % (ref 34.8–46.1)
HGB BLD-MCNC: 14.4 G/DL (ref 11.5–15.4)
IMM GRANULOCYTES # BLD AUTO: 0.02 THOUSAND/UL (ref 0–0.2)
IMM GRANULOCYTES NFR BLD AUTO: 0 % (ref 0–2)
LIPASE SERPL-CCNC: 8 U/L (ref 11–82)
LYMPHOCYTES # BLD AUTO: 3.07 THOUSANDS/ÂΜL (ref 0.6–4.47)
LYMPHOCYTES NFR BLD AUTO: 31 % (ref 14–44)
MCH RBC QN AUTO: 30.8 PG (ref 26.8–34.3)
MCHC RBC AUTO-ENTMCNC: 31.9 G/DL (ref 31.4–37.4)
MCV RBC AUTO: 96 FL (ref 82–98)
MONOCYTES # BLD AUTO: 0.62 THOUSAND/ÂΜL (ref 0.17–1.22)
MONOCYTES NFR BLD AUTO: 6 % (ref 4–12)
NEUTROPHILS # BLD AUTO: 6.22 THOUSANDS/ÂΜL (ref 1.85–7.62)
NEUTS SEG NFR BLD AUTO: 61 % (ref 43–75)
NRBC BLD AUTO-RTO: 0 /100 WBCS
PLATELET # BLD AUTO: 250 THOUSANDS/UL (ref 149–390)
PMV BLD AUTO: 9.4 FL (ref 8.9–12.7)
POTASSIUM SERPL-SCNC: 4 MMOL/L (ref 3.5–5.3)
RBC # BLD AUTO: 4.68 MILLION/UL (ref 3.81–5.12)
SODIUM SERPL-SCNC: 134 MMOL/L (ref 135–147)
WBC # BLD AUTO: 10.07 THOUSAND/UL (ref 4.31–10.16)

## 2023-06-07 PROCEDURE — 85025 COMPLETE CBC W/AUTO DIFF WBC: CPT

## 2023-06-07 PROCEDURE — 74177 CT ABD & PELVIS W/CONTRAST: CPT

## 2023-06-07 PROCEDURE — G1004 CDSM NDSC: HCPCS

## 2023-06-07 PROCEDURE — 83690 ASSAY OF LIPASE: CPT

## 2023-06-07 PROCEDURE — 36415 COLL VENOUS BLD VENIPUNCTURE: CPT

## 2023-06-07 PROCEDURE — 71260 CT THORAX DX C+: CPT

## 2023-06-07 PROCEDURE — 80048 BASIC METABOLIC PNL TOTAL CA: CPT

## 2023-06-07 RX ORDER — MORPHINE SULFATE 4 MG/ML
4 INJECTION, SOLUTION INTRAMUSCULAR; INTRAVENOUS ONCE
Status: COMPLETED | OUTPATIENT
Start: 2023-06-07 | End: 2023-06-07

## 2023-06-07 RX ORDER — LIDOCAINE 50 MG/G
1 PATCH TOPICAL DAILY
Qty: 6 PATCH | Refills: 0 | Status: SHIPPED | OUTPATIENT
Start: 2023-06-07 | End: 2023-06-08

## 2023-06-07 RX ORDER — ACETAMINOPHEN 325 MG/1
975 TABLET ORAL ONCE
Status: COMPLETED | OUTPATIENT
Start: 2023-06-07 | End: 2023-06-07

## 2023-06-07 RX ORDER — LIDOCAINE 50 MG/G
1 PATCH TOPICAL ONCE
Status: DISCONTINUED | OUTPATIENT
Start: 2023-06-07 | End: 2023-06-07 | Stop reason: HOSPADM

## 2023-06-07 RX ORDER — OXYCODONE HYDROCHLORIDE AND ACETAMINOPHEN 5; 325 MG/1; MG/1
1 TABLET ORAL EVERY 4 HOURS PRN
Qty: 10 TABLET | Refills: 0 | Status: SHIPPED | OUTPATIENT
Start: 2023-06-07 | End: 2023-06-12

## 2023-06-07 RX ADMIN — ACETAMINOPHEN 975 MG: 325 TABLET ORAL at 12:36

## 2023-06-07 RX ADMIN — IOHEXOL 100 ML: 350 INJECTION, SOLUTION INTRAVENOUS at 11:11

## 2023-06-07 RX ADMIN — MORPHINE SULFATE 4 MG: 4 INJECTION INTRAVENOUS at 10:35

## 2023-06-07 RX ADMIN — LIDOCAINE PATCH 5% 1 PATCH: 700 PATCH TOPICAL at 10:28

## 2023-06-07 RX ADMIN — MORPHINE SULFATE 2 MG: 2 INJECTION, SOLUTION INTRAMUSCULAR; INTRAVENOUS at 12:35

## 2023-06-07 NOTE — ED PROVIDER NOTES
History  Chief Complaint   Patient presents with   • Fall     Pt reports falling off chair and falling on L side hitting radiator  Pt reports increased pain with movement and inspiration  Pt denies blood thinners or LOC     Patient is a 57-year-old female with a history of chronic back pain presenting for evaluation of left side pain following a mechanical fall yesterday  States she was using a stepstool and missed the 1 step before falling and hitting the radiator with her left side  Denies head strike, loss of consciousness, blood thinners  Reports left rib pain that is worse with inspiration  Denies associated shortness of breath  No chest pain  No other injuries  She has mild upper abdominal pain as well  No neck or back pain  No dysuria or hematuria  No nausea or vomiting  Taking Tylenol at home with minimal relief  She has been able to ambulate at home without difficulty since the injury  Prior to Admission Medications   Prescriptions Last Dose Informant Patient Reported? Taking?    QUEtiapine (SEROquel) 50 mg tablet   No No   Sig: Take 1 tablet (50 mg total) by mouth daily at bedtime   gabapentin (NEURONTIN) 400 mg capsule   Yes No   Si mg 2 (two) times a day Two tablets in am and two tablets in pm   hydrOXYzine pamoate (VISTARIL) 50 mg capsule   No No   Sig: Take 2 capsules (100 mg total) by mouth 2 (two) times a day as needed for anxiety   lidocaine (LMX) 4 % cream  Self No No   Sig: Apply topically as needed for moderate pain   naloxone (NARCAN) 4 mg/0 1 mL nasal spray   Yes No   traMADol (ULTRAM) 50 mg tablet   Yes No   venlafaxine (EFFEXOR-XR) 150 mg 24 hr capsule   No No   Sig: Take 1 capsule (150 mg total) by mouth daily Take in combination with 75mg Effexor for total daily dose of (225mg)   venlafaxine (EFFEXOR-XR) 37 5 mg 24 hr capsule   No No   Sig: Take 5 capsules (187 5 mg total) by mouth daily   venlafaxine (EFFEXOR-XR) 75 mg 24 hr capsule   No No   Sig: Take 1 capsule "(75 mg total) by mouth daily Take in combination with 150mg Effexor for total daily dose of (225mg)      Facility-Administered Medications: None       Past Medical History:   Diagnosis Date   • [facility]F [facility]acute [facility] failure) [facility] ) 2022   • Aspiration pneumonitis ([facility] ) 10/1/2022   • Bacteremia due to methicillin susceptible Staphylococcus aureus (MSSA) 2022   • Chronic pain disorder    • Depression    • GERD (gastroesophageal reflux disease)    • History of electroconvulsive therapy    • Localized swelling of right upper extremity 2022   • Low back pain    • Self-injurious behavior    • Suicide attempt [facility])        Past Surgical History:   Procedure Laterality Date   •  SECTION     • COLONOSCOPY     • PANCREAS SURGERY      \"pseudocysts\" per patient's  Ricki Infante   • ND ESOPHAGOGASTRODUODENOSCOPY TRANSORAL DIAGNOSTIC N/A 4/10/2018    Procedure: EGD AND COLONOSCOPY;  Surgeon: Lillian Simental MD;  [place] [facility]; Service: Gastroenterology       Family History   Problem Relation Age of Onset   • Arthritis Mother    • Coronary artery disease Mother         MI in her 63's   • Parkinsonism Father         with falls and SDH   • Parkinsonism Paternal Grandmother    • Coronary artery disease Paternal Grandfather    • Parkinsonism Paternal Aunt    • Colon cancer Family    • Depression Neg Hx      I have reviewed and agree with the history as documented      E-Cigarette/Vaping   • E-Cigarette Use Never User      E-Cigarette/Vaping Substances   • Nicotine No    • THC No    • CBD No    • Flavoring No    • Other No    • Unknown No      Social History     Tobacco Use   • Smoking status: Some Days     Packs/day: 0 50     Years: 35 00     Total pack years: 17 50     Types: Cigarettes   • Smokeless tobacco: Never   Vaping Use   • Vaping Use: Never used   Substance Use Topics   • Alcohol use: Not Currently     Comment: \"occasional glass of wine\"   • Drug use: Yes     Types: Marijuana, Cocaine     Comment: " medical, MARIJUANA       Review of Systems   Constitutional: Negative for chills and fever  HENT: Negative for congestion, ear pain and sore throat  Eyes: Negative for pain and visual disturbance  Respiratory: Negative for cough, shortness of breath and wheezing  Cardiovascular: Negative for chest pain, palpitations and leg swelling  Gastrointestinal: Negative for abdominal pain, diarrhea, nausea and vomiting  Genitourinary: Negative for dysuria, flank pain and hematuria  Musculoskeletal: Negative for arthralgias and back pain  Skin: Negative for color change and rash  Neurological: Negative for dizziness, seizures, syncope, weakness and headaches  All other systems reviewed and are negative  Physical Exam  Physical Exam  Vitals and nursing note reviewed  Constitutional:       General: She is not in acute distress  Appearance: Normal appearance  She is not ill-appearing  HENT:      Head: Normocephalic and atraumatic  No raccoon eyes or Cui's sign  Jaw: There is normal jaw occlusion  No trismus  Right Ear: Tympanic membrane and external ear normal  No hemotympanum  Left Ear: Tympanic membrane and external ear normal  No hemotympanum  Nose: Nose normal       Mouth/Throat:      Mouth: Mucous membranes are moist  No injury  Pharynx: Oropharynx is clear  Uvula midline  No posterior oropharyngeal erythema  Eyes:      General: No scleral icterus  Right eye: No discharge  Left eye: No discharge  Extraocular Movements: Extraocular movements intact  Conjunctiva/sclera: Conjunctivae normal       Pupils: Pupils are equal, round, and reactive to light  Cardiovascular:      Rate and Rhythm: Normal rate and regular rhythm  Pulses: Normal pulses  Heart sounds: Normal heart sounds  Pulmonary:      Effort: Pulmonary effort is normal  No tachypnea, bradypnea, accessory muscle usage or respiratory distress        Breath sounds: Normal breath sounds  No decreased breath sounds  Chest:      Chest wall: Tenderness present  No lacerations, swelling, crepitus or edema  Comments: Tenderness to left ribs  No overlying ecchymosis or obvious deformity  Abdominal:      Tenderness: There is abdominal tenderness in the left upper quadrant  There is no right CVA tenderness or left CVA tenderness  Musculoskeletal:         General: No tenderness, deformity or signs of injury  Cervical back: Normal, normal range of motion and neck supple  No torticollis  No pain with movement, spinous process tenderness or muscular tenderness  Thoracic back: Normal       Lumbar back: Normal       Right hip: Normal       Left hip: Normal  No tenderness or bony tenderness  Normal range of motion  Right lower leg: No edema  Left lower leg: No edema  Skin:     General: Skin is dry  Coloration: Skin is not jaundiced  Findings: No erythema or rash  Neurological:      General: No focal deficit present  Mental Status: She is alert and oriented to person, place, and time  Mental status is at baseline  Cranial Nerves: Cranial nerves 2-12 are intact  Sensory: Sensation is intact  Motor: Motor function is intact  No weakness  Coordination: Coordination is intact  Gait: Gait normal    Psychiatric:         Mood and Affect: Mood normal          Behavior: Behavior normal          Thought Content:  Thought content normal          Vital Signs  ED Triage Vitals [06/07/23 0957]   Temperature Pulse Respirations Blood Pressure SpO2   97 6 °F (36 4 °C) 98 22 131/95 94 %      Temp Source Heart Rate Source Patient Position - Orthostatic VS BP Location FiO2 (%)   Oral Monitor Sitting Left arm --      Pain Score       10 - Worst Possible Pain           Vitals:    06/07/23 0957 06/07/23 1153   BP: 131/95 128/83   Pulse: 98 90   Patient Position - Orthostatic VS: Sitting Lying         Visual Acuity      ED Medications  Medications lidocaine (LIDODERM) 5 % patch 1 patch (1 patch Topical Medication Applied 6/7/23 1028)   morphine injection 4 mg (4 mg Intravenous Given 6/7/23 1035)   iohexol (OMNIPAQUE) 350 MG/ML injection (SINGLE-DOSE) 100 mL (100 mL Intravenous Given 6/7/23 1111)   acetaminophen (TYLENOL) tablet 975 mg (975 mg Oral Given 6/7/23 1236)   morphine injection 2 mg (2 mg Intravenous Given 6/7/23 1235)       Diagnostic Studies  Results Reviewed     Procedure Component Value Units Date/Time    Basic metabolic panel [[id number]]  (Abnormal) Collected: 06/07/23 1025    Lab Status: Final result Specimen: Blood from Arm, Right Updated: 06/07/23 1103     Sodium 134 mmol/L      Potassium 4 0 mmol/L      Chloride 95 mmol/L      CO2 33 mmol/L      ANION GAP 6 mmol/L      BUN 11 mg/dL      Creatinine 0 90 mg/dL      Glucose 129 mg/dL      Calcium 10 0 mg/dL      eGFR 72 ml/min/1 73sq m     Narrative:      [place] guidelines for Chronic Kidney Disease (CKD):   •  Stage 1 with normal or high GFR (GFR > 90 mL/min/1 73 square meters)  •  Stage 2 Mild CKD (GFR = 60-89 mL/min/1 73 square meters)  •  Stage 3A Moderate CKD (GFR = 45-59 mL/min/1 73 square meters)  •  Stage 3B Moderate CKD (GFR = 30-44 mL/min/1 73 square meters)  •  Stage 4 Severe CKD (GFR = 15-29 mL/min/1 73 square meters)  •  Stage 5 End Stage CKD (GFR <15 mL/min/1 73 square meters)  Note: GFR calculation is accurate only with a steady state creatinine    Lipase [[id number]]  (Abnormal) Collected: 06/07/23 1025    Lab Status: Final result Specimen: Blood from Arm, Right Updated: 06/07/23 1103     Lipase 8 u/L     CBC and differential [[id number]] Collected: 06/07/23 1025    Lab Status: Final result Specimen: Blood from Arm, Right Updated: 06/07/23 1038     WBC 10 07 Thousand/uL      RBC 4 68 Million/uL      Hemoglobin 14 4 g/dL      Hematocrit 45 1 %      MCV 96 fL      MCH 30 8 pg      MCHC 31 9 g/dL      RDW 14 5 %      MPV 9 4 fL      Platelets 053 Thousands/uL      nRBC 0 /100 WBCs      Neutrophils Relative 61 %      Immat GRANS % 0 %      Lymphocytes Relative 31 %      Monocytes Relative 6 %      Eosinophils Relative 1 %      Basophils Relative 1 %      Neutrophils Absolute 6 22 Thousands/µL      Immature Grans Absolute 0 02 Thousand/uL      Lymphocytes Absolute 3 07 Thousands/µL      Monocytes Absolute 0 62 Thousand/µL      Eosinophils Absolute 0 06 Thousand/µL      Basophils Absolute 0 08 Thousands/µL                  CT chest abdomen pelvis w contrast   Final Result by Leelee Joseph MD (06/07 1335)      Acute left 11th rib fracture  No other evidence of acute posttraumatic abnormality in the chest, abdomen or pelvis  1 3 cm right apical groundglass nodule unchanged since 9/19/2022  New 2 mm right lower lobe nodule  Mild emphysema  Based on current Fleischner Society 2017 Guidelines on incidental pulmonary nodule, followup noncontrast CT is recommended in 2 years; if    stable at that time, additional followup CT every 2 years is recommended until a total of 5 years stability of the groundglass nodule from the initial study is demonstrated  The study was marked in [facility] for immediate notification and follow-up  Workstation performed: [id number]                    Procedures  Procedures         ED Course  ED Course as of 06/07/23 1456   Wed Jun 07, 2023   1054 CBC and differential  No leukocytosis, anemia  1108 Lipase(!): 8   9248 Basic metabolic panel(!)  No significant electrolyte abnormalities, ANUJA  0484 31 29 02 Patient still having a lot of pain per nurse, 7/10  Will give second dose of morphine and tylenol  CT read pending  1337 CT chest abdomen pelvis w contrast  IMPRESSION:     Acute left 11th rib fracture  No other evidence of acute posttraumatic abnormality in the chest, abdomen or pelvis      1 3 cm right apical groundglass nodule unchanged since 9/19/2022  New 2 mm right lower lobe nodule  Mild emphysema   Based on current "Fleischner Society 2017 Guidelines on incidental pulmonary nodule, followup noncontrast CT is recommended in 2 years; if   stable at that time, additional followup CT every 2 years is recommended until a total of 5 years stability of the groundglass nodule from the initial study is demonstrated  SBIRT 22yo+    Flowsheet Row Most Recent Value   Initial Alcohol Screen: US AUDIT-C     1  How often do you have a drink containing alcohol? 3 Filed at: 06/07/2023 1026   2  How many drinks containing alcohol do you have on a typical day you are drinking? 1 Filed at: 06/07/2023 1026   3b  FEMALE Any Age, or MALE 65+: How often do you have 4 or more drinks on one occassion? 0 Filed at: 06/07/2023 1026   Audit-C Score 4 Filed at: 06/07/2023 1026   KIKI: How many times in the past year have you    Used an illegal drug or used a prescription medication for non-medical reasons? Weekly Filed at: 06/07/2023 1026   DAST-10: In the past 12 months       1  Have you used drugs other than those required for medical reasons? 0 Filed at: 06/07/2023 1026   2  Do you use more than one drug at a time? 0 Filed at: 06/07/2023 1026   3  Have you had medical problems as a result of your drug use (e g , memory loss, hepatitis, convulsions, bleeding, etc )? 0 Filed at: 06/07/2023 1026   4  Have you had \"blackouts\" or \"flashbacks\" as a result of drug use? YesNo 0 Filed at: 06/07/2023 1026   5  Do you ever feel bad or guilty about your drug use? 0 Filed at: 06/07/2023 1026   6  Does your spouse (or parent) ever complain about your involvement with drugs? 0 Filed at: 06/07/2023 1026   7  Have you neglected your family because of your use of drugs? 0 Filed at: 06/07/2023 1026   8  Have you engaged in illegal activities in order to obtain drugs? 0 Filed at: 06/07/2023 1026   9  Have you ever experienced withdrawal symptoms (felt sick) when you stopped taking drugs? 0 Filed at: 06/07/2023 1026   10   Are you always able " to stop using drugs when you want to? 0 Filed at: 06/07/2023 1026   DAST-10 Score 0 Filed at: 06/07/2023 1026                    Medical Decision Making  Patient is a 24-year-old female presenting for evaluation of side pain after a fall yesterday  No head strike or loss of consciousness  No other injuries or associated symptoms  All vitals are stable on arrival   Physical exam is remarkable for tenderness to the left ribs and left upper quadrant abdominal tenderness  DDx include not limited to rib fracture, rib contusion, pulmonary contusion, pneumothorax, splenic injury, pancreas injury, bowel injury, kidney injury  Will obtain abdominal labs including CBC, CMP, lipase, UA to evaluate for intra-abdominal traumatic injury  Also obtain CT chest abdomen pelvis  Will give morphine and lidocaine patch for symptom management  Labs negative for leukocytosis, anemia, significant electrolyte abnormalities, ANUJA, elevated lipase  CT shows acute left 11th rib fracture without other evidence of traumatic abnormality in the chest, abdomen, pelvis  Patient can be discharged home with supportive care  Prescribed Percocet and lidocaine patches for pain  Provided patient with incentive spirometer and instructions for use  Instructed her to follow-up with primary care doctor in a few weeks to monitor symptoms and ensure she is healing appropriately  Also discussed incidental findings of right lung nodules on CT scan that is unchanged from 9/22  Patient has history of smoking so discussed need for follow-up with PCP and repeat imaging to ensure nodule remains stable  I have discussed findings and plan for discharge with the patient/caregiver  Follow up with the appropriate providers including primary care physician was discussed  Return precautions discussed with patient/caregiver as outlined in AVS  Patient/caregiver verbally expressed understanding   Patient stable at time of discharge and ambulated out of the emergency department  Abnormal CT scan of lung: acute illness or injury  Fall, initial encounter: acute illness or injury  Rib fracture: acute illness or injury  Amount and/or Complexity of Data Reviewed  Labs: ordered  Decision-making details documented in ED Course  Radiology: ordered  Decision-making details documented in ED Course  Risk  OTC drugs  Prescription drug management  Disposition  Final diagnoses:   Fall, initial encounter   Rib fracture   Abnormal CT scan of lung     Time reflects when diagnosis was documented in both MDM as applicable and the Disposition within this note     Time User Action Codes Description Comment    6/7/2023  1:39 PM Lynita Dance Add Saida Brush  JEXR] Fall, initial encounter     6/7/2023  1:39 PM Lynita Dance Add [S22 39XA] Rib fracture     6/7/2023  1:39 PM Lynita Dance Add [R91 [id number]] Abnormal CT scan of lung       ED Disposition     ED Disposition   Discharge    Condition   Stable    Date/Time   Wed Jun 7, 2023  1:39 PM    [facility] [place] discharge to home/self care                 Follow-up Information     Follow up With Specialties Details Why Contact Info    Aggie Dumont MD Family Medicine Schedule an appointment as soon as possible for a visit   [address] [place] [address] [address] [facility] [address]  [phone]            Discharge Medication List as of 6/7/2023  1:47 PM      START taking these medications    Details   lidocaine (Lidoderm) 5 % Apply 1 patch topically over 12 hours daily Remove & Discard patch within 12 hours or as directed by MD, Starting Wed 6/7/2023, Normal      oxyCODONE-acetaminophen (Percocet) 5-325 mg per tablet Take 1 tablet by mouth every 4 (four) hours as needed for moderate pain for up to 10 days Max Daily Amount: 6 tablets, Starting Wed 6/7/2023, Until Sat 6/17/2023 at 2359, Normal         CONTINUE these medications which have NOT CHANGED    Details   gabapentin (NEURONTIN) 400 mg capsule 400 mg 2 (two) times a day Two tablets in am and two tablets in pm, Starting Tue 5/2/2023, Historical Med      hydrOXYzine pamoate (VISTARIL) 50 mg capsule Take 2 capsules (100 mg total) by mouth 2 (two) times a day as needed for anxiety, Starting Wed 5/31/2023, Normal      lidocaine (LMX) 4 % cream Apply topically as needed for moderate pain, Starting Sat 12/10/2022, Normal      naloxone (NARCAN) 4 mg/0 1 mL nasal spray Historical Med      QUEtiapine (SEROquel) 50 mg tablet Take 1 tablet (50 mg total) by mouth daily at bedtime, Starting Thu 5/18/2023, Normal      traMADol (ULTRAM) 50 mg tablet Starting Mon 2/20/2023, Historical Med      !! venlafaxine (EFFEXOR-XR) 150 mg 24 hr capsule Take 1 capsule (150 mg total) by mouth daily Take in combination with 75mg Effexor for total daily dose of (225mg), Starting Thu 5/18/2023, Until Sat 6/17/2023, Normal      !! venlafaxine (EFFEXOR-XR) 37 5 mg 24 hr capsule Take 5 capsules (187 5 mg total) by mouth daily, Starting Tue 5/16/2023, Normal      !! venlafaxine (EFFEXOR-XR) 75 mg 24 hr capsule Take 1 capsule (75 mg total) by mouth daily Take in combination with 150mg Effexor for total daily dose of (225mg), Starting Thu 5/18/2023, Until Sat 6/17/2023, Normal       !! - Potential duplicate medications found  Please discuss with provider  No discharge procedures on file      PDMP Review       Value Time User    PDMP Reviewed  Yes 2/16/2023  4:00 PM Aazm Waters MD          ED Provider  Electronically Signed by           Lacie Brothers PA-C  06/07/23 6776

## 2023-06-07 NOTE — DISCHARGE INSTRUCTIONS
You have one broken rib on your left side  Take percocet as prescribed for pain as needed  Use incentive spirometer to prevent your lung from deconditioning  Follow up with your primary care doctor  CT findings for follow up:  1 3 cm right apical groundglass nodule unchanged since 9/19/2022  New 2 mm right lower lobe nodule  Mild emphysema  Based on current Fleischner Society 2017 Guidelines on incidental pulmonary nodule, followup noncontrast CT is recommended in 2 years; if , stable at that time, additional followup CT every 2 years is recommended until a total of 5 years stability of the groundglass nodule from the initial study is demonstrated

## 2023-06-08 ENCOUNTER — APPOINTMENT (EMERGENCY)
Dept: RADIOLOGY | Facility: HOSPITAL | Age: 53
End: 2023-06-08
Payer: COMMERCIAL

## 2023-06-08 ENCOUNTER — HOSPITAL ENCOUNTER (EMERGENCY)
Facility: HOSPITAL | Age: 53
Discharge: HOME | End: 2023-06-08
Attending: EMERGENCY MEDICINE
Payer: COMMERCIAL

## 2023-06-08 VITALS
RESPIRATION RATE: 18 BRPM | TEMPERATURE: 97.8 F | OXYGEN SATURATION: 97 % | DIASTOLIC BLOOD PRESSURE: 75 MMHG | SYSTOLIC BLOOD PRESSURE: 121 MMHG | HEART RATE: 100 BPM

## 2023-06-08 DIAGNOSIS — S22.32XA LEFT RIB FRACTURE: Primary | ICD-10-CM

## 2023-06-08 PROCEDURE — 99283 EMERGENCY DEPT VISIT LOW MDM: CPT

## 2023-06-08 PROCEDURE — 71046 X-RAY EXAM CHEST 2 VIEWS: CPT

## 2023-06-08 PROCEDURE — 96372 THER/PROPH/DIAG INJ SC/IM: CPT

## 2023-06-08 RX ORDER — LIDOCAINE 50 MG/G
1 PATCH TOPICAL DAILY
Qty: 7 PATCH | Refills: 0 | Status: SHIPPED | OUTPATIENT
Start: 2023-06-08 | End: 2023-06-12

## 2023-06-08 RX ORDER — DIAZEPAM 5 MG/1
5 TABLET ORAL ONCE
Status: COMPLETED | OUTPATIENT
Start: 2023-06-08 | End: 2023-06-08

## 2023-06-08 RX ORDER — MORPHINE SULFATE 4 MG/ML
4 INJECTION, SOLUTION INTRAMUSCULAR; INTRAVENOUS ONCE
Status: COMPLETED | OUTPATIENT
Start: 2023-06-08 | End: 2023-06-08

## 2023-06-08 RX ORDER — KETOROLAC TROMETHAMINE 30 MG/ML
15 INJECTION, SOLUTION INTRAMUSCULAR; INTRAVENOUS ONCE
Status: COMPLETED | OUTPATIENT
Start: 2023-06-08 | End: 2023-06-08

## 2023-06-08 RX ORDER — METHOCARBAMOL 500 MG/1
500 TABLET, FILM COATED ORAL 2 TIMES DAILY
Qty: 10 TABLET | Refills: 0 | Status: SHIPPED | OUTPATIENT
Start: 2023-06-08 | End: 2023-06-12

## 2023-06-08 RX ORDER — ACETAMINOPHEN 500 MG
500 TABLET ORAL EVERY 6 HOURS PRN
Qty: 30 TABLET | Refills: 0 | Status: SHIPPED | OUTPATIENT
Start: 2023-06-08

## 2023-06-08 RX ORDER — NAPROXEN 500 MG/1
500 TABLET ORAL 2 TIMES DAILY WITH MEALS
Qty: 30 TABLET | Refills: 0 | Status: SHIPPED | OUTPATIENT
Start: 2023-06-08 | End: 2023-06-12

## 2023-06-08 RX ORDER — OXYCODONE HYDROCHLORIDE 5 MG/1
5 TABLET ORAL EVERY 4 HOURS PRN
Qty: 10 TABLET | Refills: 0 | Status: SHIPPED | OUTPATIENT
Start: 2023-06-08 | End: 2023-06-18

## 2023-06-08 RX ADMIN — DIAZEPAM 5 MG: 5 TABLET ORAL at 11:48

## 2023-06-08 RX ADMIN — MORPHINE SULFATE 4 MG: 4 INJECTION INTRAVENOUS at 12:08

## 2023-06-08 RX ADMIN — KETOROLAC TROMETHAMINE 15 MG: 30 INJECTION, SOLUTION INTRAMUSCULAR; INTRAVENOUS at 11:48

## 2023-06-08 NOTE — DISCHARGE INSTRUCTIONS
-tale naprosyn twice per day  Do not take ibuprofen with this as they are both NSAIDs  -take Tylenol every 4-6 hours as needed  -take robaxin every 12 hours as needed  This is a muscle relaxer so it can make you sleepy  Do not drive or operate heavy machinery after taking it   Do not take it with oxycodone   -take oxycodone for only SEVERE breakthrough pain   -use lidocaine patches as needed

## 2023-06-08 NOTE — ED PROVIDER NOTES
History  Chief Complaint   Patient presents with   • Rib Pain     Pt was seen yesterday for a fall and she has a broken rib on left ride  Pain is much worse today with increased SOB   Oxycodone did help last night and then took one again at 4am      This is a 47-year-old female with past medical history of chronic pain disorder, depression, GERD, low back pain who presents today with increasing pain  Patient was seen here yesterday after a fall and was diagnosed with a left 11th rib fracture  Pain was controlled in the ER and she was sent home  Patient reports that she went to sleep almost immediately after getting home last night  Reports that she woke up this morning with a significant amount of pain on her left side  States that she took an oxycodone around 6 AM which did not help  States that she is having shortness of breath due to the pain  Pain significantly increases with any deep breath or movement  Had difficulty getting out of bed this morning as well from the pain  Pt denies any chest pain, LH, dizziness  Prior to Admission Medications   Prescriptions Last Dose Informant Patient Reported? Taking?    QUEtiapine (SEROquel) 50 mg tablet   No No   Sig: Take 1 tablet (50 mg total) by mouth daily at bedtime   gabapentin (NEURONTIN) 400 mg capsule   Yes No   Si mg 2 (two) times a day Two tablets in am and two tablets in pm   hydrOXYzine pamoate (VISTARIL) 50 mg capsule   No No   Sig: Take 2 capsules (100 mg total) by mouth 2 (two) times a day as needed for anxiety   lidocaine (LMX) 4 % cream  Self No No   Sig: Apply topically as needed for moderate pain   naloxone (NARCAN) 4 mg/0 1 mL nasal spray   Yes No   oxyCODONE-acetaminophen (Percocet) 5-325 mg per tablet   No No   Sig: Take 1 tablet by mouth every 4 (four) hours as needed for moderate pain for up to 10 days Max Daily Amount: 6 tablets   traMADol (ULTRAM) 50 mg tablet   Yes No   venlafaxine (EFFEXOR-XR) 150 mg 24 hr capsule   No "No   Sig: Take 1 capsule (150 mg total) by mouth daily Take in combination with 75mg Effexor for total daily dose of (225mg)   venlafaxine (EFFEXOR-XR) 37 5 mg 24 hr capsule   No No   Sig: Take 5 capsules (187 5 mg total) by mouth daily   venlafaxine (EFFEXOR-XR) 75 mg 24 hr capsule   No No   Sig: Take 1 capsule (75 mg total) by mouth daily Take in combination with 150mg Effexor for total daily dose of (225mg)      Facility-Administered Medications: None       Past Medical History:   Diagnosis Date   • ARF (acute renal failure) ([address] ) 2022   • Aspiration pneumonitis ([facility] ) 10/1/2022   • Bacteremia due to methicillin susceptible Staphylococcus aureus (MSSA) 2022   • Chronic pain disorder    • Depression    • GERD (gastroesophageal reflux disease)    • History of electroconvulsive therapy    • Localized swelling of right upper extremity 2022   • Low back pain    • Self-injurious behavior    • Suicide attempt [facility])        Past Surgical History:   Procedure Laterality Date   •  SECTION     • COLONOSCOPY     • PANCREAS SURGERY      \"pseudocysts\" per patient's  Ricki Infante   • NC ESOPHAGOGASTRODUODENOSCOPY TRANSORAL DIAGNOSTIC N/A 4/10/2018    Procedure: EGD AND COLONOSCOPY;  Surgeon: Sergio Watson MD;  [place] [facility] GI LAB; Service: Gastroenterology       Family History   Problem Relation Age of Onset   • Arthritis Mother    • Coronary artery disease Mother         MI in her 63's   • Parkinsonism Father         with falls and SDH   • Parkinsonism Paternal Grandmother    • Coronary artery disease Paternal Grandfather    • Parkinsonism Paternal Aunt    • Colon cancer Family    • Depression Neg Hx      I have reviewed and agree with the history as documented      E-Cigarette/Vaping   • E-Cigarette Use Never User      E-Cigarette/Vaping Substances   • Nicotine No    • THC No    • CBD No    • Flavoring No    • Other No    • Unknown No      Social History     Tobacco Use   • Smoking status: Some " "Days     Packs/day: 0 50     Years: 35 00     Total pack years: 17 50     Types: Cigarettes   • Smokeless tobacco: Never   Vaping Use   • Vaping Use: Never used   Substance Use Topics   • Alcohol use: Not Currently     Comment: \"occasional glass of wine\"   • Drug use: Yes     Types: Marijuana, Cocaine     Comment: medical, MARIJUANA       Review of Systems   Constitutional: Negative for chills and fever  Respiratory: Positive for shortness of breath  Cardiovascular: Negative for chest pain  Musculoskeletal:        Rib pain   All other systems reviewed and are negative  Physical Exam  Physical Exam  Vitals and nursing note reviewed  Constitutional:       General: She is in acute distress  Appearance: Normal appearance  She is well-developed  HENT:      Head: Normocephalic and atraumatic  Eyes:      Conjunctiva/sclera: Conjunctivae normal    Cardiovascular:      Rate and Rhythm: Normal rate and regular rhythm  Heart sounds: No murmur heard  Pulmonary:      Effort: Pulmonary effort is normal  No respiratory distress  Breath sounds: Normal breath sounds  Comments: Short of breath due to the pain, take shallow breaths  Pain appears to be consistent with muscle spasms (waxing and waning in nature)  Abdominal:      Palpations: Abdomen is soft  Tenderness: There is no abdominal tenderness  Musculoskeletal:         General: No swelling  Cervical back: Neck supple  Skin:     General: Skin is warm and dry  Capillary Refill: Capillary refill takes less than 2 seconds  Neurological:      Mental Status: She is alert     Psychiatric:         Mood and Affect: Mood normal          Vital Signs  ED Triage Vitals   Temperature Pulse Respirations Blood Pressure SpO2   06/08/23 1030 06/08/23 1030 06/08/23 1030 06/08/23 1030 06/08/23 1030   97 8 °F (36 6 °C) 101 19 141/72 95 %      Temp src Heart Rate Source Patient Position - Orthostatic VS BP Location FiO2 (%)   -- 06/08/23 1202 " 06/08/23 1030 06/08/23 1030 --    Monitor Sitting Right arm       Pain Score       06/08/23 1148       10 - Worst Possible Pain           Vitals:    06/08/23 1030 06/08/23 1202   BP: 141/72 121/75   Pulse: 101 100   Patient Position - Orthostatic VS: Sitting Lying         Visual Acuity      ED Medications  Medications   ketorolac (TORADOL) injection 15 mg (15 mg Intramuscular Given 6/8/23 1148)   diazepam (VALIUM) tablet 5 mg (5 mg Oral Given 6/8/23 1148)   morphine injection 4 mg (4 mg Intramuscular Given 6/8/23 1208)       Diagnostic Studies  Results Reviewed     None                 XR chest 2 views   ED Interpretation by Can Moreland PA-C (06/08 1136)   No acute cardiopulmonary disease       Final Result by Claudean Seitz, MD (06/08 1243)      No acute cardiopulmonary disease  Workstation performed: [id number]                    Procedures  Procedures         ED Course  ED Course as of 06/08/23 1322   Thu Jun 08, 2023   1047 Pt sats 95% and above while I was in the room  Pt is taking shallow breaths due to the pain  Will try Toradol and valium  Will also obtain CXR to r/o pneumo   1308 Discussed results with the pt  Discussed pain management for at home  Should follow up with PCP                                SBIRT 22yo+    Flowsheet Row Most Recent Value   Initial Alcohol Screen: US AUDIT-C     1  How often do you have a drink containing alcohol? 3 Filed at: 06/08/2023 1140   2  How many drinks containing alcohol do you have on a typical day you are drinking? 1 Filed at: 06/08/2023 1140   3b  FEMALE Any Age, or MALE 65+: How often do you have 4 or more drinks on one occassion? 0 Filed at: 06/08/2023 1140   Audit-C Score 4 Filed at: 06/08/2023 1140   KIKI: How many times in the past year have you    Used an illegal drug or used a prescription medication for non-medical reasons?  Never Filed at: 06/08/2023 1140                    Medical Decision Making  This is a 53-year-old female with past medical history of chronic pain disorder, depression, GERD, low back pain who presents today with increasing pain s/p 11th left rib fracture yesterday  On physical exam pt has some bruising on the left side near 11th rib, no flail chest, no decreased breath sounds  Plan to get xray to r/o pneumothorax/pna and pain control  Xray reviewed by me and it showed no acute cardiopulmonary disease, final read of xray is in agreement  Pt given toradol, morphine and valium here  Pain did improve  Discussed with pt that she can expect to be in some pain at home, especially since it is the first day s/p fall  Discussed medications for at home  Changed recommended roxicodone over percocet  Discussed naprosyn (since pt has history of ulcer), tylenol, lidocaine patches and muscle relaxer  Discussed possible side effects and interactions of the medications  Stressed importance of incentive spirometry at home  I have discussed the plan to discharge pt from ED  The patient was discharged in stable condition   Patient ambulated off the department   Extensive return to emergency department precautions were discussed   Follow up with appropriate providers including primary care physician was discussed   Patient and/or their  primary decision maker expressed understanding  [facility] remained stable during entire emergency department stay  Left rib fracture: acute illness or injury     Details: diagnosed yesterday s/p fall   Amount and/or Complexity of Data Reviewed  Radiology: ordered and independent interpretation performed  Risk  OTC drugs  Prescription drug management  Parenteral controlled substances            Disposition  Final diagnoses:   Left rib fracture     Time reflects when diagnosis was documented in both MDM as applicable and the Disposition within this note     Time User Action Codes Description Comment    6/8/2023  1:08 PM David Maradiaga Add [S22 [id number]] Left rib fracture       ED Disposition ED Disposition   Discharge    Condition   Stable    Date/Time   Thu Jun 8, 2023  1:08 PM    Comment   Benny Stringer discharge to home/self care                 Follow-up Information    None         Discharge Medication List as of 6/8/2023  1:12 PM      START taking these medications    Details   acetaminophen (TYLENOL) 500 mg tablet Take 1 tablet (500 mg total) by mouth every 6 (six) hours as needed for mild pain, Starting Thu 6/8/2023, Normal      lidocaine (Lidoderm) 5 % Apply 1 patch topically over 12 hours daily Remove & Discard patch within 12 hours or as directed by MD, Starting Thu 6/8/2023, Normal      methocarbamol (ROBAXIN) 500 mg tablet Take 1 tablet (500 mg total) by mouth 2 (two) times a day, Starting Thu 6/8/2023, Normal      naproxen (Naprosyn) 500 mg tablet Take 1 tablet (500 mg total) by mouth 2 (two) times a day with meals, Starting Thu 6/8/2023, Normal      oxyCODONE (Roxicodone) 5 immediate release tablet Take 1 tablet (5 mg total) by mouth every 4 (four) hours as needed for severe pain for up to 10 days Max Daily Amount: 30 mg, Starting Thu 6/8/2023, Until Sun 6/18/2023 at 2359, Normal         CONTINUE these medications which have NOT CHANGED    Details   gabapentin (NEURONTIN) 400 mg capsule 400 mg 2 (two) times a day Two tablets in am and two tablets in pm, Starting Tue 5/2/2023, Historical Med      hydrOXYzine pamoate (VISTARIL) 50 mg capsule Take 2 capsules (100 mg total) by mouth 2 (two) times a day as needed for anxiety, Starting Wed 5/31/2023, Normal      lidocaine (LMX) 4 % cream Apply topically as needed for moderate pain, Starting Sat 12/10/2022, Normal      naloxone (NARCAN) 4 mg/0 1 mL nasal spray Historical Med      oxyCODONE-acetaminophen (Percocet) 5-325 mg per tablet Take 1 tablet by mouth every 4 (four) hours as needed for moderate pain for up to 10 days Max Daily Amount: 6 tablets, Starting Wed 6/7/2023, Until Sat 6/17/2023 at 2359, Normal      QUEtiapine (SEROquel) 50 mg tablet Take 1 tablet (50 mg total) by mouth daily at bedtime, Starting Thu 5/18/2023, Normal      traMADol (ULTRAM) 50 mg tablet Starting Mon 2/20/2023, Historical Med      !! venlafaxine (EFFEXOR-XR) 150 mg 24 hr capsule Take 1 capsule (150 mg total) by mouth daily Take in combination with 75mg Effexor for total daily dose of (225mg), Starting Thu 5/18/2023, Until Sat 6/17/2023, Normal      !! venlafaxine (EFFEXOR-XR) 37 5 mg 24 hr capsule Take 5 capsules (187 5 mg total) by mouth daily, Starting Tue 5/16/2023, Normal      !! venlafaxine (EFFEXOR-XR) 75 mg 24 hr capsule Take 1 capsule (75 mg total) by mouth daily Take in combination with 150mg Effexor for total daily dose of (225mg), Starting Thu 5/18/2023, Until Sat 6/17/2023, Normal       !! - Potential duplicate medications found  Please discuss with provider  No discharge procedures on file      PDMP Review       Value Time User    PDMP Reviewed  Yes 2/16/2023  4:00 PM Emerald Fishman MD          ED Provider  Electronically Signed by           Irineo Coats PA-C  06/08/23 4636

## 2023-06-12 ENCOUNTER — OFFICE VISIT (OUTPATIENT)
Dept: FAMILY MEDICINE CLINIC | Facility: CLINIC | Age: 53
End: 2023-06-12
Payer: COMMERCIAL

## 2023-06-12 VITALS
HEART RATE: 103 BPM | WEIGHT: 129 LBS | TEMPERATURE: 98.2 F | RESPIRATION RATE: 16 BRPM | HEIGHT: 66 IN | DIASTOLIC BLOOD PRESSURE: 82 MMHG | SYSTOLIC BLOOD PRESSURE: 120 MMHG | BODY MASS INDEX: 20.73 KG/M2

## 2023-06-12 DIAGNOSIS — I10 PRIMARY HYPERTENSION: ICD-10-CM

## 2023-06-12 DIAGNOSIS — S22.32XA LEFT RIB FRACTURE: ICD-10-CM
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
## 2023-06-12 DIAGNOSIS — S20.212D CONTUSION OF LEFT CHEST WALL, SUBSEQUENT ENCOUNTER: ICD-10-CM

## 2023-06-12 DIAGNOSIS — S22.32XA CLOSED FRACTURE OF ONE RIB OF LEFT SIDE, INITIAL ENCOUNTER: Primary | ICD-10-CM

## 2023-06-12 DIAGNOSIS — F11.20 UNCOMPLICATED OPIOID DEPENDENCE (HCC): ICD-10-CM

## 2023-06-12 DIAGNOSIS — Z72.0 TOBACCO ABUSE: ICD-10-CM

## 2023-06-12 DIAGNOSIS — K21.9 GASTROESOPHAGEAL REFLUX DISEASE WITHOUT ESOPHAGITIS: ICD-10-CM

## 2023-06-12 DIAGNOSIS — R91.8 GROUND GLASS OPACITY PRESENT ON IMAGING OF LUNG: ICD-10-CM

## 2023-06-12 DIAGNOSIS — J43.9 PULMONARY EMPHYSEMA, UNSPECIFIED EMPHYSEMA TYPE (HCC): ICD-10-CM

## 2023-06-12 DIAGNOSIS — R91.1 PULMONARY NODULE 1 CM OR GREATER IN DIAMETER: ICD-10-CM

## 2023-06-12 PROBLEM — R93.89 ABNORMAL CT OF THE CHEST: Status: RESOLVED | Noted: 2022-09-19 | Resolved: 2023-06-12

## 2023-06-12 PROBLEM — J81.1 PULMONARY EDEMA: Status: RESOLVED | Noted: 2022-09-30 | Resolved: 2023-06-12

## 2023-06-12 PROBLEM — R79.89 D-DIMER, ELEVATED: Status: RESOLVED | Noted: 2022-09-22 | Resolved: 2023-06-12

## 2023-06-12 PROCEDURE — 99215 OFFICE O/P EST HI 40 MIN: CPT | Performed by: FAMILY MEDICINE

## 2023-06-12 RX ORDER — NAPROXEN 500 MG/1
500 TABLET ORAL 2 TIMES DAILY WITH MEALS
Qty: 30 TABLET | Refills: 0 | Status: CANCELLED | OUTPATIENT
Start: 2023-06-12

## 2023-06-12 RX ORDER — LIDOCAINE 50 MG/G
1 PATCH TOPICAL DAILY
Qty: 30 PATCH | Refills: 0 | Status: SHIPPED | OUTPATIENT
Start: 2023-06-12

## 2023-06-12 RX ORDER — METHOCARBAMOL 500 MG/1
500 TABLET, FILM COATED ORAL 2 TIMES DAILY
Qty: 10 TABLET | Refills: 0 | Status: CANCELLED | OUTPATIENT
Start: 2023-06-12

## 2023-06-12 RX ORDER — OXYCODONE HYDROCHLORIDE 5 MG/1
5 TABLET ORAL EVERY 4 HOURS PRN
Qty: 10 TABLET | Refills: 0 | Status: CANCELLED | OUTPATIENT
Start: 2023-06-12 | End: 2023-06-22

## 2023-06-12 RX ORDER — CELECOXIB 200 MG/1
200 CAPSULE ORAL 2 TIMES DAILY
Qty: 60 CAPSULE | Refills: 5 | Status: SHIPPED | OUTPATIENT
Start: 2023-06-12

## 2023-06-12 RX ORDER — TIZANIDINE 4 MG/1
4 TABLET ORAL EVERY 8 HOURS PRN
Qty: 30 TABLET | Refills: 0 | Status: SHIPPED | OUTPATIENT
Start: 2023-06-12 | End: 2023-06-19

## 2023-06-12 RX ORDER — OMEPRAZOLE 20 MG/1
20 CAPSULE, DELAYED RELEASE ORAL
Qty: 30 CAPSULE | Refills: 5 | Status: SHIPPED | OUTPATIENT
Start: 2023-06-12 | End: 2023-12-09

## 2023-06-12 NOTE — PATIENT INSTRUCTIONS
Discontinue Naprosyn and Robaxin  Start Celebrex 200 mg 1 twice daily with food  Start tizanidine 4 mg every 8 hours as needed for muscle spasm or pain  Use omeprazole 20 mg daily  Is lighted Derm daily  Recheck 2 weeks  Because you seen pain management and will not prescribe narcotics if you feel you need them you must discuss this with pain management  I recommend you discontinue smoking  Repeat CT of chest in 1 year  Patient use ice 15 minutes 4 times daily to left chest wall  Patient may use Tylenol 325 mg 2 tablets every 6 hours in addition to her medications

## 2023-06-12 NOTE — ASSESSMENT & PLAN NOTE
Patient stating she had a history of ulcers years ago  NSAID was discontinued must use Mathews 2    Omeprazole was reordered

## 2023-06-12 NOTE — PROGRESS NOTES
Name: Juan Eid      : 1970      MRN: [id number]  Encounter Provider: Sumeet Yi DO  Encounter Date: 2023   Encounter department: [facility]    Assessment & Plan     1  Fall  2  Left rib fracture  3  Chest wall contusion  ER evaluation discussed especially CT and chest x-ray  Lidoderm reordered  Change to tizanidine 4 mg every 8 hours drowsiness discussed  Celebrex is ordered  Patient is allergic to sulfa antibiotic and sulfasalazine however this is sulfite should not cause any issues patient aware  I recommend ice 15 minutes 4 times daily to area  4  Tobacco abuse, complete cessation recommended  5  Pulmonary nodule/groundglass density per radiology repeat 1 year  6  Hypertension, stable continue present therapy  7  Opioid dependence, patient is on tramadol I will not prescribe chronic she should check with her pain management physician at all  by Dr Fahad Zambrano  8  GERD with history of peptic ulcer disease, omeprazole ordered  No NSAID  9  Recheck 2 weeks      1  Closed fracture of one rib of left side, initial encounter  -     tiZANidine (ZANAFLEX) 4 mg tablet; Take 1 tablet (4 mg total) by mouth every 8 (eight) hours as needed for muscle spasms (Or chest wall pain)    2  Ground glass opacity present on imaging of lung  -     CT chest wo contrast; Future; Expected date: 2024    3  Pulmonary nodule 1 cm or greater in diameter  Assessment & Plan:  Repeat CT 1 year    Orders:  -     CT chest wo contrast; Future; Expected date: 2024    4  Pulmonary emphysema, unspecified emphysema type ([facility] [address] )  -     CT chest wo contrast; Future; Expected date: 2024    5  Tobacco abuse  Assessment & Plan:  Recommend complete cessation    Orders:  -     CT chest wo contrast; Future; Expected date: 2024    6  Left rib fracture  -     tiZANidine (ZANAFLEX) 4 mg tablet;  Take 1 tablet (4 mg total) by mouth every 8 (eight) hours as needed for muscle spasms (Or chest wall pain)  -     celecoxib (CeleBREX) 200 mg capsule; Take 1 capsule (200 mg total) by mouth 2 (two) times a day  -     lidocaine (Lidoderm) 5 %; Apply 1 patch topically over 12 hours daily Remove & Discard patch within 12 hours or as directed by MD    7  Gastroesophageal reflux disease without esophagitis  Assessment & Plan:  Patient stating she had a history of ulcers years ago  NSAID was discontinued must use Mathews 2  Omeprazole was reordered      8  Primary hypertension  Assessment & Plan:  Stable continue present therapy      9  Fall, subsequent encounter  -     tiZANidine (ZANAFLEX) 4 mg tablet; Take 1 tablet (4 mg total) by mouth every 8 (eight) hours as needed for muscle spasms (Or chest wall pain)    10  Contusion of left chest wall, subsequent encounter  -     tiZANidine (ZANAFLEX) 4 mg tablet; Take 1 tablet (4 mg total) by mouth every 8 (eight) hours as needed for muscle spasms (Or chest wall pain)  -     celecoxib (CeleBREX) 200 mg capsule; Take 1 capsule (200 mg total) by mouth 2 (two) times a day    11  Uncomplicated opioid dependence [facility])  Assessment & Plan:  Patient sees pain management will not prescribe narcotic             Subjective      Patient fell 6/7/2023 was seen in ER and again on the eighth  For pain  Patient did have a fracture of left rib  Patient still with pain  Discussed that her medications patient is on Naprosyn however she has a history of GERD and peptic ulcer disease discontinue is on Robaxin we will change to something stronger called tizanidine  Patient also requested oxycodone, I defer that to her pain management specialist I will not prescribe narcotics  CT chest and chest x-ray discussed with patient  Patient does continue to smoke    Review of Systems   Constitutional: Negative for chills and fever  HENT: Negative for ear pain and sore throat  Eyes: Negative for pain and visual disturbance     Respiratory: Negative for cough and shortness of breath  Cardiovascular: Negative for chest pain and palpitations  Gastrointestinal: Negative for abdominal pain and vomiting  Genitourinary: Negative for dysuria and hematuria  Musculoskeletal: Negative for arthralgias and back pain  Rib cage, status post rib fracture   Skin: Negative for color change and rash  Neurological: Negative for seizures and syncope  All other systems reviewed and are negative        Current Outpatient Medications on File Prior to Visit   Medication Sig   • acetaminophen (TYLENOL) 500 mg tablet Take 1 tablet (500 mg total) by mouth every 6 (six) hours as needed for mild pain   • gabapentin (NEURONTIN) 400 mg capsule 400 mg 2 (two) times a day Two tablets in am and two tablets in pm   • hydrOXYzine pamoate (VISTARIL) 50 mg capsule Take 2 capsules (100 mg total) by mouth 2 (two) times a day as needed for anxiety   • naloxone (NARCAN) 4 mg/0 1 mL nasal spray    • oxyCODONE (Roxicodone) 5 immediate release tablet Take 1 tablet (5 mg total) by mouth every 4 (four) hours as needed for severe pain for up to 10 days Max Daily Amount: 30 mg   • QUEtiapine (SEROquel) 50 mg tablet Take 1 tablet (50 mg total) by mouth daily at bedtime   • traMADol (ULTRAM) 50 mg tablet    • venlafaxine (EFFEXOR-XR) 150 mg 24 hr capsule Take 1 capsule (150 mg total) by mouth daily Take in combination with 75mg Effexor for total daily dose of (225mg)   • venlafaxine (EFFEXOR-XR) 37 5 mg 24 hr capsule Take 5 capsules (187 5 mg total) by mouth daily   • venlafaxine (EFFEXOR-XR) 75 mg 24 hr capsule Take 1 capsule (75 mg total) by mouth daily Take in combination with 150mg Effexor for total daily dose of (225mg)   • [DISCONTINUED] lidocaine (Lidoderm) 5 % Apply 1 patch topically over 12 hours daily Remove & Discard patch within 12 hours or as directed by MD   • [DISCONTINUED] lidocaine (LMX) 4 % cream Apply topically as needed for moderate pain   • [DISCONTINUED] methocarbamol (ROBAXIN) 500 mg tablet Take "1 tablet (500 mg total) by mouth 2 (two) times a day   • [DISCONTINUED] naproxen (Naprosyn) 500 mg tablet Take 1 tablet (500 mg total) by mouth 2 (two) times a day with meals   • [DISCONTINUED] oxyCODONE-acetaminophen (Percocet) 5-325 mg per tablet Take 1 tablet by mouth every 4 (four) hours as needed for moderate pain for up to 10 days Max Daily Amount: 6 tablets       Objective     /82 (BP [place] Right arm, Patient Position: Sitting, Cuff Size: Adult)   Pulse 103   Temp 98 2 °F (36 8 °C) (Temporal)   Resp 16   Ht 5' 6\" (1 676 m)   Wt 58 5 kg (129 lb)   LMP 03/14/2019 (Approximate)   BMI 20 82 kg/m²     Physical Exam  Vitals and nursing note reviewed  Constitutional:       Appearance: Normal appearance  HENT:      Head: Normocephalic and atraumatic  Mouth/Throat:      Mouth: Mucous membranes are moist    Eyes:      General: No scleral icterus  Neck:      Vascular: No carotid bruit  Cardiovascular:      Rate and Rhythm: Normal rate and regular rhythm  Heart sounds: Normal heart sounds  Pulmonary:      Effort: Pulmonary effort is normal       Breath sounds: Normal breath sounds  Abdominal:      General: Bowel sounds are normal       Palpations: Abdomen is soft  Tenderness: There is no abdominal tenderness  Musculoskeletal:         General: Tenderness present  Cervical back: Neck supple  Comments: Lateral chest wall tenderness negative crepitus   Skin:     Findings: Bruising present  Comments: Ecchymosis left chest wall   Neurological:      General: No focal deficit present  Mental Status: She is alert     Psychiatric:         Mood and Affect: Mood normal        Bruce Leon DO  "

## 2023-06-16 ENCOUNTER — TELEPHONE (OUTPATIENT)
Dept: NEUROSURGERY | Facility: CLINIC | Age: 53
End: 2023-06-16

## 2023-06-16 ENCOUNTER — TELEPHONE (OUTPATIENT)
Dept: FAMILY MEDICINE CLINIC | Facility: CLINIC | Age: 53
End: 2023-06-16

## 2023-06-16 NOTE — TELEPHONE ENCOUNTER
Patient called in and stated after her last visit with dr. Yvette Nicole she was given celebrex to help with pain from her rib. Patient stated that the celebrex has been giving her much relief. Patient asked if jayden arzate would consider giving her tramadol. Patient stated that she has had a good history with that medication.  Please advise thank you

## 2023-06-16 NOTE — TELEPHONE ENCOUNTER
Received call on nurseline from patient stating she recently saw Dr Deepa Molina 6/1/2023 at which time he placed a referral to pain management for discussion of a SCS trial  She states that [facility]'s pain management reached out to her and told her that she is not a good candidate for a SCSdue to her history of major depressive disorder  Additionally she states that she reached out to her regular pain management provider who also told her she was not a candidate for a SCS without explanation and wanted to know why  This RN provided education on how psychological factors such as somatizations, depression, anxiety, poor coping, are important predictors of poor outcomes following SCS  Patient would like to know how Dr Deepa Molina would like her to proceed from here  This RN will route message to provider for further direction and will contact patient  All questions answered at this time, she was very appreciative of the assistance and the call back
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
## 2023-06-17 DIAGNOSIS — S20.212D CONTUSION OF LEFT CHEST WALL, SUBSEQUENT ENCOUNTER: ICD-10-CM

## 2023-06-17 DIAGNOSIS — S22.32XA CLOSED FRACTURE OF ONE RIB OF LEFT SIDE, INITIAL ENCOUNTER: ICD-10-CM

## 2023-06-17 DIAGNOSIS — S22.32XA LEFT RIB FRACTURE: ICD-10-CM

## 2023-06-19 RX ORDER — TIZANIDINE 4 MG/1
TABLET ORAL
Qty: 30 TABLET | Refills: 0 | Status: SHIPPED | OUTPATIENT
Start: 2023-06-19

## 2023-06-20 DIAGNOSIS — T14.8XXA NERVE COMPRESSION: Primary | ICD-10-CM

## 2023-06-20 NOTE — PROGRESS NOTES
After discussion with Dr Vik Wang, he would like patient referred to a peripheral nerve specialist at [facility]  External referral placed through [facility], however [facility] has a referral intake form that must be filled out online which this RN has completed  Discussed with patient and she expressed understanding, all questions answered at this time, she was appreciative of the assistance

## 2023-06-22 NOTE — PROGRESS NOTES
Received call on direct line from patient stating she received call from [facility] to schedule her appointment for 9/15/2023  She was questioning as to whether we could get the appointment made sooner  Explained that we have no control over scheduling at [facility]  All questions answered at this time, she was appreciative of the call back

## 2023-06-26 NOTE — PROGRESS NOTES
Received a call from Corrine Howell requesting EMG results be faxed to [facility]  Completed this request  Andres Leach to [phone] Attn: Zac Arriaga  Contacted Corrine Howell to advise this has been completed  She was appreciative

## 2023-07-05 ENCOUNTER — OFFICE VISIT (OUTPATIENT)
Dept: PSYCHIATRY | Facility: CLINIC | Age: 53
End: 2023-07-05
Payer: COMMERCIAL

## 2023-07-05 DIAGNOSIS — F33.2 SEVERE EPISODE OF RECURRENT MAJOR DEPRESSIVE DISORDER, WITHOUT PSYCHOTIC FEATURES (HCC): ICD-10-CM

## 2023-07-05 DIAGNOSIS — F41.1 GENERALIZED ANXIETY DISORDER: Primary | ICD-10-CM

## 2023-07-05 PROCEDURE — 90833 PSYTX W PT W E/M 30 MIN: CPT | Performed by: STUDENT IN AN ORGANIZED HEALTH CARE EDUCATION/TRAINING PROGRAM

## 2023-07-05 PROCEDURE — 99214 OFFICE O/P EST MOD 30 MIN: CPT | Performed by: STUDENT IN AN ORGANIZED HEALTH CARE EDUCATION/TRAINING PROGRAM

## 2023-07-05 RX ORDER — BUSPIRONE HYDROCHLORIDE 5 MG/1
5 TABLET ORAL 3 TIMES DAILY
Qty: 90 TABLET | Refills: 2 | Status: SHIPPED | OUTPATIENT
Start: 2023-07-05 | End: 2023-08-04

## 2023-07-05 RX ORDER — QUETIAPINE FUMARATE 100 MG/1
100 TABLET, FILM COATED ORAL
Qty: 30 TABLET | Refills: 2 | Status: SHIPPED | OUTPATIENT
Start: 2023-07-05 | End: 2023-08-04

## 2023-07-05 RX ORDER — VENLAFAXINE HYDROCHLORIDE 150 MG/1
150 CAPSULE, EXTENDED RELEASE ORAL DAILY
Qty: 30 CAPSULE | Refills: 2 | Status: SHIPPED | OUTPATIENT
Start: 2023-07-05 | End: 2023-08-04

## 2023-07-05 RX ORDER — VENLAFAXINE HYDROCHLORIDE 75 MG/1
75 CAPSULE, EXTENDED RELEASE ORAL DAILY
Qty: 30 CAPSULE | Refills: 2 | Status: SHIPPED | OUTPATIENT
Start: 2023-07-05 | End: 2023-08-04

## 2023-07-05 NOTE — PSYCH
MEDICATION MANAGEMENT NOTE        [facility] [facility]      Name and Date of Birth:  Bc Chaudhary 45 y.o. 1970 MRN: [id number]    Date of Visit: July 5, 2023    Reason for Visit: Follow-up visit regarding medication management     Chief Complaint: "I have been having more anxiety"    SUBJECTIVE:    Bc Chaudhary is a 45 y.o., female, possessing a past psychiatric history significant for major depressive disorder, generalized anxiety disorder, medically complicated by IBS, COPD, hypertension, degenerative disc disease, who presents for follow-up appointment for medication management. Elvin Lucas states that since their previous outpatient psychiatric appointment, she has been having more anxiety. She has been ruminating more about her medical conditions. Worried about an upcoming evaluation with pain medicine for potential nerve surgery for her lower extremities. Her mobility has been decreased since her injuries and she has had difficulty ambulating, is worried "about the unknown and is unsure if the surgery will provide any benefit to her. She also reports that she has been arguing more frequently with her  at home, feeling as though "he just does not care". Reporting that he does not assist with things at the house and she feels a lot of the responsibility falls on her. She states that she feels tense, keyed up, on edge throughout the day most days. She also having trouble sleeping, mostly maintaining sleep throughout the night with numerous awakenings in the middle of the night. Appetite levels are fair. Energy level somewhat decreased. Reported that the Atarax was not incredibly helpful and she stopped taking this due to it not being effective. She denies any SI, HI, AVH.     Current Rating Scores:   Current PHQ-9   PHQ-2/9 Depression Screening           Psychiatric Review Of Systems:    Appetite: no  Adverse eating: no  Weight changes: no  Insomnia/sleeplessness: decreased  Fatigue/anergy: decreased  Anhedonia/lack of interest: no  Attention/concentration: no  Psychomotor agitation/retardation: no  Somatic symptoms: no  Anxiety/panic attack: yes, worrying  Dalia/hypomania: no  Hopelessness/helplessness/worthlessness: no  Self-injurious behavior/high-risk behavior: no  Suicidal ideation: no  Homicidal ideation: no  Auditory hallucinations: no  Visual hallucinations: no  Other perceptual disturbances: no  Delusional thinking: no  Obsessive/compulsive symptoms: no    Review Of Systems:      Constitutional negative   ENT negative   Cardiovascular negative   Respiratory negative   Gastrointestinal negative   Genitourinary negative   Musculoskeletal negative   Integumentary negative   Neurological negative   Endocrine negative   Other Symptoms none, all other systems are negative     History Review: The following portions of the patient's history were reviewed and updated as appropriate: allergies, current medications, past family history, past medical history, past social history, past surgical history, and problem list. Previous progress notes/assessments from other providers reviewed as available.     Past Medical History:    Past Medical History:   Diagnosis Date   • ARF (acute renal failure) ([address]) 9/19/2022   • Aspiration pneumonitis ([address]) 10/1/2022   • Bacteremia due to methicillin susceptible Staphylococcus aureus (MSSA) 9/27/2022   • Chronic pain disorder    • Depression    • GERD (gastroesophageal reflux disease)    • History of electroconvulsive therapy    • Localized swelling of right upper extremity 9/26/2022   • Low back pain    • Self-injurious behavior    • Suicide attempt ([address])         Allergies   Allergen Reactions   • Chantix [Varenicline]    • Ibuprofen Other (See Comments)     Upset stomach   • Lyrica [Pregabalin] Other (See Comments)     bruising   • Penicillins Other (See Comments)     ? hives   • Sulfa Antibiotics Other (See Comments)     sloughing skin in mouth   • Sulfasalazine        Substance Abuse History:    Social History     Substance and Sexual Activity   Alcohol Use Not Currently    Comment: "occasional glass of wine"     Social History     Substance and Sexual Activity   Drug Use Yes   • Types: Marijuana, Cocaine    Comment: medical, MARIJUANA       Social History:    Social History     Socioeconomic History   • Marital status: /Civil Union     Spouse name: Not on file   • Number of children: Not on file   • Years of education: Not on file   • Highest education level: Not on file   Occupational History   • Occupation: disability   Tobacco Use   • Smoking status: Some Days     Packs/day: 0.50     Years: 35.00     Total pack years: 17.50     Types: Cigarettes   • Smokeless tobacco: Never   Vaping Use   • Vaping Use: Never used   Substance and Sexual Activity   • Alcohol use: Not Currently     Comment: "occasional glass of wine"   • Drug use: Yes     Types: Marijuana, Cocaine     Comment: medical, MARIJUANA   • Sexual activity: Yes     Partners: Male     Birth control/protection: Post-menopausal     Comment: PT is    Other Topics Concern   • Not on file   Social History Narrative    Lives with spouse     Social Determinants of Health     Financial Resource Strain: Not on file   Food Insecurity: No Food Insecurity (2/9/2023)    Hunger Vital Sign    • Worried About Running Out of Food in the Last Year: Never true    • Ran Out of Food in the Last Year: Never true   Transportation Needs: No Transportation Needs (2/9/2023)    PRAPARE - Transportation    • Lack of Transportation (Medical): No    • Lack of Transportation (Non-Medical):  No   Physical Activity: Not on file   Stress: Not on file   Social Connections: Not on file   Intimate Partner Violence: Not on file   Housing Stability: Low Risk  (2/9/2023)    Housing Stability Vital Sign    • Unable to Pay for Housing in the Last Year: No    • Number of Places Lived in the Last Year: 1    • Unstable Housing in the Last Year: No       Family Psychiatric History:     Family History   Problem Relation Age of Onset   • Arthritis Mother    • Coronary artery disease Mother         MI in her 63's   • Parkinsonism Father         with falls and SDH   • Parkinsonism Paternal Grandmother    • Coronary artery disease Paternal Grandfather    • Parkinsonism Paternal Aunt    • Colon cancer Family    • Depression Neg Hx             OBJECTIVE:     Vital signs in last 24 hours: There were no vitals filed for this visit. Mental Status Evaluation:    Appearance age appropriate, casually dressed   Behavior cooperative, appears anxious   Speech normal rate, normal volume, normal pitch   Mood anxious   Affect constricted   Thought Processes organized, goal directed   Associations intact associations   Thought Content no overt delusions   Perceptual Disturbances: no auditory hallucinations, no visual hallucinations   Abnormal Thoughts  Risk Potential Suicidal ideation - None  Homicidal ideation - None  Potential for aggression - No   Orientation oriented to person, place, time/date and situation   Memory recent and remote memory grossly intact   Consciousness alert and awake   Attention Span Concentration Span normal attention span  normal concentration   Intellect appears to be of average intelligence   Insight intact   Judgement intact   Muscle Strength and  Gait normal muscle strength and normal muscle tone, normal gait and normal balance   Motor activity no abnormal movements   Fund of Knowledge adequate knowledge of current events  adequate fund of knowledge regarding past history  adequate fund of knowledge regarding vocabulary    Pain none   Pain Scale 0       Laboratory Results: I have personally reviewed all pertinent laboratory/tests results    Recent Labs (last 4 months):    Admission on 06/07/2023, Discharged on 06/07/2023   Component Date Value   • WBC 06/07/2023 10.07    • RBC 06/07/2023 4.68    • Hemoglobin 06/07/2023 14.4    • Hematocrit 06/07/2023 45.1    • MCV 06/07/2023 96    • MCH 06/07/2023 30.8    • MCHC 06/07/2023 31.9    • RDW 06/07/2023 14.5    • MPV 06/07/2023 9.4    • Platelets 63/49/1998 250    • nRBC 06/07/2023 0    • Neutrophils Relative 06/07/2023 61    • Immat GRANS % 06/07/2023 0    • Lymphocytes Relative 06/07/2023 31    • Monocytes Relative 06/07/2023 6    • Eosinophils Relative 06/07/2023 1    • Basophils Relative 06/07/2023 1    • Neutrophils Absolute 06/07/2023 6.22    • Immature Grans Absolute 06/07/2023 0.02    • Lymphocytes Absolute 06/07/2023 3.07    • Monocytes Absolute 06/07/2023 0.62    • Eosinophils Absolute 06/07/2023 0.06    • Basophils Absolute 06/07/2023 0.08    • Sodium 06/07/2023 134 (L)    • Potassium 06/07/2023 4.0    • Chloride 06/07/2023 95 (L)    • CO2 06/07/2023 33 (H)    • ANION GAP 06/07/2023 6    • BUN 06/07/2023 11    • Creatinine 06/07/2023 0.90    • Glucose 06/07/2023 129    • Calcium 06/07/2023 10.0    • eGFR 06/07/2023 72    • Lipase 06/07/2023 8 (L)        Suicide/Homicide Risk Assessment:    The following interventions are recommended: no intervention changes needed. Although patient's acute lethality risk is low, long-term/chronic lethality risk is mildly elevated in the presence of current diagnoses. At the current moment, Perez Adams is future-oriented, forward-thinking, and demonstrates ability to act in a self-preserving manner as evidenced by volitionally presenting to the clinic today, seeking treatment. To mitigate future risk, patient should adhere to the recommendations below, avoid alcohol/illicit substance use, utilize community-based resources and familiar support and prioritize mental health treatment. Presently, patient denies active suicidal/homicidal ideation in addition to thoughts of self-injury; contracts for safety. At conclusion of evaluation, patient is amenable to the recommendations below.  Patient is amenable to calling/contacting the outpatient office including this writer if any acute adverse effects of their medication regimen arise in addition to any comments or concerns pertaining to their psychiatric management. Patient is amenable to calling/contacting crisis and/or attending to the nearest emergency department if their clinical condition deteriorates to assure their safety and stability, stating that they are able to appropriately confide in their family regarding their psychiatric state. Assessment/Plan:     Samuel Lewis is a 26-year-old female who carries a past psychiatric history significant for generalized anxiety disorder and major depressive disorder. She presents to establish care in his transfer of services from [facility]. She has a long history of depression and anxiety stemming back to childhood, fluctuating throughout her lifetime and has trialed various antidepressant medications. She has been on Effexor for the longest period of time which seemingly has been helpful. There is a history of abuse, in the past heavily abusing alcohol although has been sober from this for 16 years. She has had some benzodiazepine abuse as well, more recently being admitted to the hospital for encephalopathy after an overdose on Xanax. She is struggling with sleep with anxiety symptoms at this time and feels that depression is mostly stable. Her anxiety seems to stem from numerous medical conditions , most recently neuropathy and chronic back pain which seems to be limiting her physical mobility. 5/18/23 -worsening anxiety, mostly stemming from an upcoming EMG. She requests benzos initially although we discussed potential for abuse. She will try increased dose of Effexor to 225 mg and as needed Vistaril. In the future consider adjusting Effexor to Cymbalta for neuropathic pain. We will also consider a TCA if needed - perhaps nortriptyline. 7/5/23 -reporting increased anxiety symptoms. Atarax was ineffective and stopped using this as a as needed. Also struggling with poor sleep and chronic issues with pain related to peripheral nerves. Followed with neurosurgery consultation of whom has recommendations to be evaluated by a physician at [address] [place] [address]. Relationship issues are ongoing with her , she is considering separation. She is open to therapy which she had not been previously. We will place referral and increase Seroquel to target sleep symptoms. May consider a brief use of sleep aid if Seroquel optimization is not effective. DSM-5 Diagnoses:     • Major depressive disorder, recurrent, moderate  • Generalized anxiety disorder  • Alcohol use disorder in sustained remission      Treatment Recommendations/Precautions:    • Effexor to 225 mg daily for depression and anxiety. • Increase Seroquel to 100 mg daily for depression augmentation and insomnia. • Discontinue Vistaril  • Referral for therapy  • Aware of need to follow up with family physician for medical issues  • Aware of 24 hour and weekend coverage for urgent situations accessed by calling [facility] main practice number    Medications Risks/Benefits      Risks, Benefits And Possible Side Effects Of Medications:    Risks, benefits, and possible side effects of medications explained to Kevin Pepe including risk of cardiovascular events in elderly related to treatment with antipsychotic medications and risk of suicidality and serotonin syndrome related to treatment with antidepressants. She verbalizes understanding and agreement for treatment. .    Controlled Medication Discussion:     Not applicable    Psychotherapy Provided:     Individual psychotherapy provided: Yes  Counseling was provided during the session today for 16 minutes. Recent stressor including family problems, family conflict, marital problems, financial problems, health issues and medical problems discussed with Kevin Pepe.    Coping skills reviewed with Tree Segura. Reassurance and supportive therapy provided.       Treatment Plan:    Completed and signed during the session: Not applicable - Treatment Plan not due at this session    This note was not shared with the patient due to reasonable likelihood of causing patient harm    Visit Time    Visit Start Time: 3:30PM  Visit Stop Time: 4PM  Total Visit Duration: 30 minutes        Brody Shukla MD 07/05/23

## 2023-07-06 ENCOUNTER — TELEPHONE (OUTPATIENT)
Dept: PSYCHIATRY | Facility: CLINIC | Age: 53
End: 2023-07-06

## 2023-07-06 NOTE — TELEPHONE ENCOUNTER
----- Message from Love Matthew MD sent at 7/5/2023  4:14 PM EDT -----  Regarding: Refer for Therapy  Jace Mcbride,    Could you please assist with getting Noemi Alvarez scheduled with therapy/adding her to the [address] [place] [address]? Thank you!     Love Matthew MD

## 2023-07-07 ENCOUNTER — TELEPHONE (OUTPATIENT)
Dept: NEUROSURGERY | Facility: CLINIC | Age: 53
End: 2023-07-07

## 2023-07-07 NOTE — TELEPHONE ENCOUNTER
Patient called call center asking to make an appointment with geni to be evalutated for a concussion she had last year. Her licensed was medically taken away and wanted to be evaluated to get her license back. I explained she was never seen here for her concussion and neurosurgery office did not medically take her license she would need to reach out to the doctor who took care of her concussion. She said she never followed up with anyone . I explained neurology may be able to help her gave her number to call and ask st [address] [place] [address] neurology office on [address]

## 2023-07-11 ENCOUNTER — TELEPHONE (OUTPATIENT)
Dept: OTHER | Facility: OTHER | Age: 53
End: 2023-07-11

## 2023-07-14 ENCOUNTER — TELEPHONE (OUTPATIENT)
Dept: FAMILY MEDICINE CLINIC | Facility: CLINIC | Age: 53
End: 2023-07-14

## 2023-07-14 NOTE — TELEPHONE ENCOUNTER
Pt called stating that she has been seeing a neurosurgeon, and he referred her to see a specialist at [address] [place] [address]. Pt stated she spoke with Shruthi Moreno from [address] [place] [address], and he told her that she would need a referral from her PCP. Pt emphasized that it is not an insurance referral, but just in general. Her appt is on Aug 25th. Pt willing to come in for appt if necessary to get referral.     Gave fax # for [facility] - [phone].

## 2023-07-17 ENCOUNTER — TELEPHONE (OUTPATIENT)
Dept: FAMILY MEDICINE CLINIC | Facility: CLINIC | Age: 53
End: 2023-07-17

## 2023-07-17 NOTE — TELEPHONE ENCOUNTER
Pt called stating that Alie [address] had given her a referral for social work last November but nobody had called her. Looked at pt's chart and referral was closed. Pt wondering if referral could please be reopened.

## 2023-07-18 ENCOUNTER — PATIENT OUTREACH (OUTPATIENT)
Dept: FAMILY MEDICINE CLINIC | Facility: CLINIC | Age: 53
End: 2023-07-18

## 2023-07-18 DIAGNOSIS — Z74.2 ASSISTANCE NEEDED AT HOME: Primary | ICD-10-CM

## 2023-07-18 NOTE — PROGRESS NOTES
OP CM vd message that pt is seeking . Spoke to pt in Dec 2022. OP LOVE called to pt on cell and home phone number and left message. Email sent to pt as well with my contact info.

## 2023-07-19 NOTE — TELEPHONE ENCOUNTER
LMOM in detail. Advised pt if needed from us will need to schedule OV with PCP but referral should come from neuro.

## 2023-08-01 ENCOUNTER — CONSULT (OUTPATIENT)
Dept: FAMILY MEDICINE CLINIC | Facility: CLINIC | Age: 53
End: 2023-08-01
Payer: COMMERCIAL

## 2023-08-01 VITALS
OXYGEN SATURATION: 100 % | HEIGHT: 66 IN | WEIGHT: 126.2 LBS | DIASTOLIC BLOOD PRESSURE: 78 MMHG | HEART RATE: 99 BPM | SYSTOLIC BLOOD PRESSURE: 112 MMHG | TEMPERATURE: 97.2 F | RESPIRATION RATE: 14 BRPM | BODY MASS INDEX: 20.28 KG/M2

## 2023-08-01 DIAGNOSIS — K55.9 ISCHEMIC COLITIS (HCC): ICD-10-CM

## 2023-08-01 DIAGNOSIS — E87.0 HYPERNATREMIA: ICD-10-CM

## 2023-08-01 DIAGNOSIS — E87.6 HYPOKALEMIA: ICD-10-CM

## 2023-08-01 DIAGNOSIS — F11.20 UNCOMPLICATED OPIOID DEPENDENCE (HCC): ICD-10-CM

## 2023-08-01 DIAGNOSIS — Z01.818 PREOPERATIVE CLEARANCE: Primary | ICD-10-CM

## 2023-08-01 DIAGNOSIS — E78.2 MIXED HYPERLIPIDEMIA: ICD-10-CM

## 2023-08-01 DIAGNOSIS — E44.1 MILD PROTEIN-CALORIE MALNUTRITION (HCC): ICD-10-CM

## 2023-08-01 DIAGNOSIS — Z72.0 TOBACCO ABUSE: ICD-10-CM

## 2023-08-01 DIAGNOSIS — T79.6XXS TRAUMATIC RHABDOMYOLYSIS, SEQUELA: ICD-10-CM

## 2023-08-01 DIAGNOSIS — J43.9 PULMONARY EMPHYSEMA, UNSPECIFIED EMPHYSEMA TYPE (HCC): ICD-10-CM

## 2023-08-01 DIAGNOSIS — I10 PRIMARY HYPERTENSION: ICD-10-CM

## 2023-08-01 DIAGNOSIS — F33.42 RECURRENT MAJOR DEPRESSIVE DISORDER, IN FULL REMISSION (HCC): ICD-10-CM

## 2023-08-01 DIAGNOSIS — R91.1 PULMONARY NODULE 1 CM OR GREATER IN DIAMETER: ICD-10-CM

## 2023-08-01 DIAGNOSIS — M21.371 FOOT DROP, RIGHT: ICD-10-CM

## 2023-08-01 DIAGNOSIS — G57.31 PERONEAL NEUROPATHY, RIGHT: ICD-10-CM

## 2023-08-01 DIAGNOSIS — R76.8 CYCLIC CITRULLINATED PEPTIDE (CCP) ANTIBODY POSITIVE: ICD-10-CM

## 2023-08-01 PROBLEM — M22.40 CHONDROMALACIA PATELLAE: Status: RESOLVED | Noted: 2018-04-18 | Resolved: 2023-08-01

## 2023-08-01 PROBLEM — G92.8 TOXIC METABOLIC ENCEPHALOPATHY: Status: RESOLVED | Noted: 2022-09-19 | Resolved: 2023-08-01

## 2023-08-01 PROBLEM — R60.0 LEG EDEMA, RIGHT: Status: RESOLVED | Noted: 2022-09-23 | Resolved: 2023-08-01

## 2023-08-01 PROBLEM — K92.1 HEMATOCHEZIA: Status: RESOLVED | Noted: 2019-05-29 | Resolved: 2023-08-01

## 2023-08-01 PROBLEM — R94.31 PROLONGED Q-T INTERVAL ON ECG: Status: RESOLVED | Noted: 2023-02-08 | Resolved: 2023-08-01

## 2023-08-01 PROBLEM — Z87.81 HISTORY OF RIB FRACTURE: Status: RESOLVED | Noted: 2018-10-28 | Resolved: 2023-08-01

## 2023-08-01 PROCEDURE — 99214 OFFICE O/P EST MOD 30 MIN: CPT | Performed by: FAMILY MEDICINE

## 2023-08-01 NOTE — ASSESSMENT & PLAN NOTE
Malnutrition Findings:          Blood work ordered                       BMI Findings: Body mass index is 20.37 kg/m².

## 2023-08-01 NOTE — ASSESSMENT & PLAN NOTE
Follows with specialist well developed, well nourished , in no acute distress , ambulating without difficulty , normal communication ability

## 2023-08-01 NOTE — PATIENT INSTRUCTIONS
Patient is cleared for upcoming surgery  Recommend complete tobacco cessation  Return in 6 months for office visit and blood work sooner if needed

## 2023-08-01 NOTE — PROGRESS NOTES
Name: Terrell Gomes      : 1970      MRN: [id number]  Encounter Provider: Jonn Burkett DO  Encounter Date: 2023   Encounter department: [facility] PRIMARY CARE    Assessment & Plan     1. Preoperative clearance  Patient seen and examined chart reviewed  Preoperative blood work reviewed  Preoperative EKG reviewed  Patient is cleared for upcoming surgery  2. Right foot drop/right peroneal neuropathy  Scheduled on 2023 [facility] for right common peroneal nerve decompression at fibular head. This will be done by neurosurgeon, Dr. Maria Dolores Craven  3. Hypertension, stable continue present therapy #4. COPD, stable O2 saturation 100% lungs are clear #5. Uncontrolled opioid dependence/mild stenosis, stable patient is followed by pain management  6. Tobacco abuse, complete cessation recommended CT scan was done   5  Ischemic colitis, resolved  6  Pulmonary nodule, CT , has repeat scheduled   7. Hyperlipidemia blood work ordered  8. MDD, in remission follows with psychiatry #9. Hypokalemia/hyponatremia, resolved  10. Protein calorie malnutrition blood work ordered #11. CCP positive, stable follows with specialist  12. Formal follow-up will be scheduled in 6 months for office visit and blood work sooner if needed      1. Preoperative clearance  -     CBC; Future; Expected date: 2024  -     Comprehensive metabolic panel; Future; Expected date: 2024  -     Hemoglobin A1C; Future; Expected date: 2024  -     Lipid Panel with Direct LDL reflex; Future; Expected date: 2024  -     TSH, 3rd generation with Free T4 reflex; Future; Expected date: 2024  -     UA (URINE) with reflex to Scope; Future; Expected date: 2024    2. Foot drop, right  Assessment & Plan: For surgery 2023    Orders:  -     CBC; Future; Expected date: 2024  -     Comprehensive metabolic panel;  Future; Expected date: 2024  -     Hemoglobin A1C; Future; Expected date: 02/01/2024  -     Lipid Panel with Direct LDL reflex; Future; Expected date: 02/01/2024  -     TSH, 3rd generation with Free T4 reflex; Future; Expected date: 02/01/2024  -     UA (URINE) with reflex to Scope; Future; Expected date: 02/01/2024    3. Peroneal neuropathy, right  Assessment & Plan:  Surgical repair 8/16/2023    Orders:  -     CBC; Future; Expected date: 02/01/2024  -     Comprehensive metabolic panel; Future; Expected date: 02/01/2024  -     Hemoglobin A1C; Future; Expected date: 02/01/2024  -     Lipid Panel with Direct LDL reflex; Future; Expected date: 02/01/2024  -     TSH, 3rd generation with Free T4 reflex; Future; Expected date: 02/01/2024  -     UA (URINE) with reflex to Scope; Future; Expected date: 02/01/2024    4. Primary hypertension  Assessment & Plan:  Stable continue present therapy    Orders:  -     CBC; Future; Expected date: 02/01/2024  -     Comprehensive metabolic panel; Future; Expected date: 02/01/2024  -     Hemoglobin A1C; Future; Expected date: 02/01/2024  -     Lipid Panel with Direct LDL reflex; Future; Expected date: 02/01/2024  -     TSH, 3rd generation with Free T4 reflex; Future; Expected date: 02/01/2024  -     UA (URINE) with reflex to Scope; Future; Expected date: 02/01/2024    5. Pulmonary emphysema, unspecified emphysema type ([address])  Assessment & Plan:  Lungs are clear O2 saturations 100% on room air    Orders:  -     CBC; Future; Expected date: 02/01/2024  -     Comprehensive metabolic panel; Future; Expected date: 02/01/2024  -     Hemoglobin A1C; Future; Expected date: 02/01/2024  -     Lipid Panel with Direct LDL reflex; Future; Expected date: 02/01/2024  -     TSH, 3rd generation with Free T4 reflex; Future; Expected date: 02/01/2024  -     UA (URINE) with reflex to Scope; Future; Expected date: 02/01/2024    6. Uncomplicated opioid dependence ([address])  Assessment & Plan:  Follows with pain management    Orders:  -     CBC;  Future; Expected date: 02/01/2024  -     Comprehensive metabolic panel; Future; Expected date: 02/01/2024  -     Hemoglobin A1C; Future; Expected date: 02/01/2024  -     Lipid Panel with Direct LDL reflex; Future; Expected date: 02/01/2024  -     TSH, 3rd generation with Free T4 reflex; Future; Expected date: 02/01/2024  -     UA (URINE) with reflex to Scope; Future; Expected date: 02/01/2024    7. Tobacco abuse  Assessment & Plan:  CT was completed 6/23, complete tobacco cessation is recommended    Orders:  -     CBC; Future; Expected date: 02/01/2024  -     Comprehensive metabolic panel; Future; Expected date: 02/01/2024  -     Hemoglobin A1C; Future; Expected date: 02/01/2024  -     Lipid Panel with Direct LDL reflex; Future; Expected date: 02/01/2024  -     TSH, 3rd generation with Free T4 reflex; Future; Expected date: 02/01/2024  -     UA (URINE) with reflex to Scope; Future; Expected date: 02/01/2024    8. Ischemic colitis [facility])  Assessment & Plan:  Resolved    Orders:  -     CBC; Future; Expected date: 02/01/2024  -     Comprehensive metabolic panel; Future; Expected date: 02/01/2024  -     Hemoglobin A1C; Future; Expected date: 02/01/2024  -     Lipid Panel with Direct LDL reflex; Future; Expected date: 02/01/2024  -     TSH, 3rd generation with Free T4 reflex; Future; Expected date: 02/01/2024  -     UA (URINE) with reflex to Scope; Future; Expected date: 02/01/2024    9. Pulmonary nodule 1 cm or greater in diameter  Assessment & Plan:  Repeat CBC already ordered for 6/24    Orders:  -     CBC; Future; Expected date: 02/01/2024  -     Comprehensive metabolic panel; Future; Expected date: 02/01/2024  -     Hemoglobin A1C; Future; Expected date: 02/01/2024  -     Lipid Panel with Direct LDL reflex; Future; Expected date: 02/01/2024  -     TSH, 3rd generation with Free T4 reflex; Future; Expected date: 02/01/2024  -     UA (URINE) with reflex to Scope; Future; Expected date: 02/01/2024    10.  Traumatic rhabdomyolysis, sequela  Assessment & Plan:  Resolved    Orders:  -     CBC; Future; Expected date: 02/01/2024  -     Comprehensive metabolic panel; Future; Expected date: 02/01/2024  -     Hemoglobin A1C; Future; Expected date: 02/01/2024  -     Lipid Panel with Direct LDL reflex; Future; Expected date: 02/01/2024  -     TSH, 3rd generation with Free T4 reflex; Future; Expected date: 02/01/2024  -     UA (URINE) with reflex to Scope; Future; Expected date: 02/01/2024    11. Mixed hyperlipidemia  Assessment & Plan:  Blood work ordered    Orders:  -     CBC; Future; Expected date: 02/01/2024  -     Comprehensive metabolic panel; Future; Expected date: 02/01/2024  -     Hemoglobin A1C; Future; Expected date: 02/01/2024  -     Lipid Panel with Direct LDL reflex; Future; Expected date: 02/01/2024  -     TSH, 3rd generation with Free T4 reflex; Future; Expected date: 02/01/2024  -     UA (URINE) with reflex to Scope; Future; Expected date: 02/01/2024    12. Recurrent major depressive disorder, in full remission [facility])  Assessment & Plan:  In remission follows with psychiatry    Orders:  -     CBC; Future; Expected date: 02/01/2024  -     Comprehensive metabolic panel; Future; Expected date: 02/01/2024  -     Hemoglobin A1C; Future; Expected date: 02/01/2024  -     Lipid Panel with Direct LDL reflex; Future; Expected date: 02/01/2024  -     TSH, 3rd generation with Free T4 reflex; Future; Expected date: 02/01/2024  -     UA (URINE) with reflex to Scope; Future; Expected date: 02/01/2024    13. Hypokalemia  Assessment & Plan:  Resolved on preop work    Orders:  -     CBC; Future; Expected date: 02/01/2024  -     Comprehensive metabolic panel; Future; Expected date: 02/01/2024  -     Hemoglobin A1C; Future; Expected date: 02/01/2024  -     Lipid Panel with Direct LDL reflex; Future; Expected date: 02/01/2024  -     TSH, 3rd generation with Free T4 reflex; Future; Expected date: 02/01/2024  -     UA (URINE) with reflex to Scope;  Future; Expected date: 02/01/2024    14. Hypernatremia  Assessment & Plan:  Resolved on preoperative blood work    Orders:  -     CBC; Future; Expected date: 02/01/2024  -     Comprehensive metabolic panel; Future; Expected date: 02/01/2024  -     Hemoglobin A1C; Future; Expected date: 02/01/2024  -     Lipid Panel with Direct LDL reflex; Future; Expected date: 02/01/2024  -     TSH, 3rd generation with Free T4 reflex; Future; Expected date: 02/01/2024  -     UA (URINE) with reflex to Scope; Future; Expected date: 02/01/2024    15. Mild protein-calorie malnutrition ([address])  Assessment & Plan:  Malnutrition Findings:          Blood work ordered                       BMI Findings: Body mass index is 20.37 kg/m². Orders:  -     CBC; Future; Expected date: 02/01/2024  -     Comprehensive metabolic panel; Future; Expected date: 02/01/2024  -     Hemoglobin A1C; Future; Expected date: 02/01/2024  -     Lipid Panel with Direct LDL reflex; Future; Expected date: 02/01/2024  -     TSH, 3rd generation with Free T4 reflex; Future; Expected date: 02/01/2024  -     UA (URINE) with reflex to Scope; Future; Expected date: 02/01/2024    16. Cyclic citrullinated peptide (CCP) antibody positive  Assessment & Plan:  Follows with specialist    Orders:  -     CBC; Future; Expected date: 02/01/2024  -     Comprehensive metabolic panel; Future; Expected date: 02/01/2024  -     Hemoglobin A1C; Future; Expected date: 02/01/2024  -     Lipid Panel with Direct LDL reflex; Future; Expected date: 02/01/2024  -     TSH, 3rd generation with Free T4 reflex; Future; Expected date: 02/01/2024  -     UA (URINE) with reflex to Scope; Future; Expected date: 02/01/2024           Subjective      Patient is here for preoperative clearance. Patient is scheduled at [address] [place] [address] on 8/16/2023 for right peroneal nerve root decompression at fibular head. Patient's been having pain and right foot drop.   Neurosurgeon, Dr. Barbara Cardona will be doing this.  Preoperative blood work to include PT, PTT, BMP, CBC was reviewed and is acceptable for surgery. Patient's preoperative EKG 7/31/2023 was normal.    Review of Systems   Constitutional: Negative for appetite change and fever. Respiratory: Negative for chest tightness and shortness of breath. Cardiovascular: Negative for chest pain, palpitations and leg swelling. Gastrointestinal: Negative for abdominal pain. Genitourinary: Negative for difficulty urinating. Musculoskeletal: Negative for arthralgias and myalgias. Pain on right leg and foot   Skin: Negative for wound. Neurological: Negative for dizziness, light-headedness and headaches. HPI   Psychiatric/Behavioral: Negative for dysphoric mood and sleep disturbance. The patient is not nervous/anxious.         Current Outpatient Medications on File Prior to Visit   Medication Sig   • acetaminophen (TYLENOL) 500 mg tablet Take 1 tablet (500 mg total) by mouth every 6 (six) hours as needed for mild pain   • busPIRone (BUSPAR) 5 mg tablet Take 1 tablet (5 mg total) by mouth 3 (three) times a day   • celecoxib (CeleBREX) 200 mg capsule Take 1 capsule (200 mg total) by mouth 2 (two) times a day   • gabapentin (NEURONTIN) 400 mg capsule 400 mg 2 (two) times a day Two tablets in am and two tablets in pm   • lidocaine (Lidoderm) 5 % Apply 1 patch topically over 12 hours daily Remove & Discard patch within 12 hours or as directed by MD   • naloxone (NARCAN) 4 mg/0.1 mL nasal spray    • omeprazole (PriLOSEC) 20 mg delayed release capsule Take 1 capsule (20 mg total) by mouth daily before breakfast   • QUEtiapine (SEROquel) 100 mg tablet Take 1 tablet (100 mg total) by mouth daily at bedtime   • tiZANidine (ZANAFLEX) 4 mg tablet TAKE 1 TABLET BY MOUTH EVERY 8 HOURS AS NEEDED FOR MUSCLE SPASMS (OR CHEST WALL PAIN)   • traMADol (ULTRAM) 50 mg tablet    • venlafaxine (EFFEXOR-XR) 150 mg 24 hr capsule Take 1 capsule (150 mg total) by mouth daily Take in combination with 75mg Effexor for total daily dose of (225mg)   • venlafaxine (EFFEXOR-XR) 75 mg 24 hr capsule Take 1 capsule (75 mg total) by mouth daily Take in combination with 150mg Effexor for total daily dose of (225mg)       Objective     /78 (BP [place] Right arm, Patient Position: Sitting, Cuff Size: Adult)   Pulse 99   Temp (!) 97.2 °F (36.2 °C) (Temporal)   Resp 14   Ht 5' 6" (1.676 m)   Wt 57.2 kg (126 lb 3.2 oz)   LMP 03/14/2019 (Approximate)   SpO2 100%   BMI 20.37 kg/m²     Physical Exam  Vitals and nursing note reviewed. Constitutional:       Appearance: Normal appearance. HENT:      Head: Normocephalic and atraumatic. Right Ear: Tympanic membrane normal.      Left Ear: Tympanic membrane normal.      Nose: Nose normal.      Mouth/Throat:      Mouth: Mucous membranes are moist.      Pharynx: Oropharynx is clear. Eyes:      General: No scleral icterus. Right eye: No discharge. Left eye: No discharge. Conjunctiva/sclera: Conjunctivae normal.      Pupils: Pupils are equal, round, and reactive to light. Neck:      Vascular: No carotid bruit. Cardiovascular:      Rate and Rhythm: Normal rate and regular rhythm. Heart sounds: Normal heart sounds. Pulmonary:      Effort: Pulmonary effort is normal.      Breath sounds: Normal breath sounds. Abdominal:      General: Bowel sounds are normal.      Palpations: Abdomen is soft. Tenderness: There is no abdominal tenderness. Musculoskeletal:      Cervical back: Neck supple. Right lower leg: No edema. Left lower leg: No edema. Lymphadenopathy:      Cervical: No cervical adenopathy. Skin:     General: Skin is warm and dry. Neurological:      Mental Status: She is alert and oriented to person, place, and time. Motor: Weakness present.       Comments: Chronic right foot drop   Psychiatric:         Mood and Affect: Mood normal.       Bruce Leon DO

## 2023-08-21 ENCOUNTER — OFFICE VISIT (OUTPATIENT)
Dept: PSYCHIATRY | Facility: CLINIC | Age: 53
End: 2023-08-21
Payer: COMMERCIAL

## 2023-08-21 DIAGNOSIS — F41.1 GENERALIZED ANXIETY DISORDER: ICD-10-CM

## 2023-08-21 DIAGNOSIS — F33.2 SEVERE EPISODE OF RECURRENT MAJOR DEPRESSIVE DISORDER, WITHOUT PSYCHOTIC FEATURES (HCC): Primary | ICD-10-CM

## 2023-08-21 DIAGNOSIS — G47.00 INSOMNIA, UNSPECIFIED TYPE: ICD-10-CM

## 2023-08-21 PROCEDURE — 99214 OFFICE O/P EST MOD 30 MIN: CPT | Performed by: STUDENT IN AN ORGANIZED HEALTH CARE EDUCATION/TRAINING PROGRAM

## 2023-08-21 RX ORDER — SENNOSIDES A AND B 8.6 MG/1
8.6 TABLET, FILM COATED ORAL 2 TIMES DAILY
COMMUNITY
Start: 2023-08-16

## 2023-08-21 RX ORDER — BUSPIRONE HYDROCHLORIDE 10 MG/1
10 TABLET ORAL 3 TIMES DAILY
Qty: 90 TABLET | Refills: 2 | Status: SHIPPED | OUTPATIENT
Start: 2023-08-21 | End: 2023-11-19

## 2023-08-21 RX ORDER — TRAZODONE HYDROCHLORIDE 100 MG/1
100 TABLET ORAL
Qty: 30 TABLET | Refills: 2 | Status: SHIPPED | OUTPATIENT
Start: 2023-08-21 | End: 2023-11-19

## 2023-08-21 RX ORDER — QUETIAPINE FUMARATE 100 MG/1
200 TABLET, FILM COATED ORAL
Qty: 60 TABLET | Refills: 2 | Status: SHIPPED | OUTPATIENT
Start: 2023-08-21 | End: 2023-11-19

## 2023-08-21 NOTE — PSYCH
MEDICATION MANAGEMENT NOTE        [facility] [address] [facility]      Name and Date of Birth:  Eddie Brooke 45 y.o. 1970 MRN: [id number]    Date of Visit: August 21, 2023    Reason for Visit: Follow-up visit regarding medication management     Chief Complaint: "I am having trouble sleeping"    SUBJECTIVE:    Eddie Brooke is a 45 y.o., female, possessing a past psychiatric history significant formajor depressive disorder, generalized anxiety disorder, medically complicated by IBS, COPD, hypertension, degenerative disc disease, who presents for follow-up appointment for medication management. Francisco Dawn states that since their previous outpatient psychiatric appointment, she has been having trouble sleeping. She reports that she is having difficulty initiating and maintaining sleep. Finds that she will sleep approximately 4 hours per night. When she does awaken at night she has difficulty getting back to sleep will often to stay up for the rest of the day. She feels low energy during the day and does not nap and will sometimes drink increased caffeine amounts to help maintain her energy levels. She feels that in terms of depression and anxiety she still is having times in which she feels tense, anxious, keyed up, on edge. No significant panic attacks. Depression has been better managed and she feels better in terms of this over the past few weeks. She is struggling with ongoing medical issues, recently had a nerve decompression for her foot drop. Struggling with pain postop from this and is hoping that it will improve her mobility and chronic leg/nerve pain as well. Denies any SI, HI, AVH.     Current Rating Scores:   Current PHQ-9   PHQ-2/9 Depression Screening           Psychiatric Review Of Systems:    Appetite: no  Adverse eating: no  Weight changes: no  Insomnia/sleeplessness: decreased  Fatigue/anergy: decreased  Anhedonia/lack of interest: no  Attention/concentration: no  Psychomotor agitation/retardation: no  Somatic symptoms: no  Anxiety/panic attack: yes, worrying  Dalia/hypomania: no  Hopelessness/helplessness/worthlessness: no  Self-injurious behavior/high-risk behavior: no  Suicidal ideation: no  Homicidal ideation: no  Auditory hallucinations: no  Visual hallucinations: no  Other perceptual disturbances: no  Delusional thinking: no  Obsessive/compulsive symptoms: no    Review Of Systems:      Constitutional negative   ENT negative   Cardiovascular negative   Respiratory negative   Gastrointestinal negative   Genitourinary negative   Musculoskeletal negative   Integumentary negative   Neurological neuropathic pain   Endocrine negative   Other Symptoms none, all other systems are negative     History Review: The following portions of the patient's history were reviewed and updated as appropriate: allergies, current medications, past family history, past medical history, past social history, past surgical history, and problem list. Previous progress notes/assessments from other providers reviewed as available.     Past Medical History:    Past Medical History:   Diagnosis Date   • ARF (acute renal failure) ([address]) 9/19/2022   • Aspiration pneumonitis ([address]) 10/1/2022   • Bacteremia due to methicillin susceptible Staphylococcus aureus (MSSA) 9/27/2022   • Chronic pain disorder    • Depression    • GERD (gastroesophageal reflux disease)    • History of electroconvulsive therapy    • Localized swelling of right upper extremity 9/26/2022   • Low back pain    • Self-injurious behavior    • Suicide attempt ([address])         Allergies   Allergen Reactions   • Chantix [Varenicline]    • Ibuprofen Other (See Comments)     Upset stomach   • Lyrica [Pregabalin] Other (See Comments)     bruising   • Penicillins Other (See Comments)     ? hives   • Sulfa Antibiotics Other (See Comments)     sloughing skin in mouth   • Sulfasalazine        Substance Abuse History:    Social History     Substance and Sexual Activity   Alcohol Use Not Currently    Comment: "occasional glass of wine"     Social History     Substance and Sexual Activity   Drug Use Yes   • Types: Marijuana, Cocaine    Comment: medical, MARIJUANA       Social History:    Social History     Socioeconomic History   • Marital status: /Civil Union     Spouse name: Not on file   • Number of children: Not on file   • Years of education: Not on file   • Highest education level: Not on file   Occupational History   • Occupation: disability   Tobacco Use   • Smoking status: Some Days     Packs/day: 0.50     Years: 35.00     Total pack years: 17.50     Types: Cigarettes   • Smokeless tobacco: Never   Vaping Use   • Vaping Use: Never used   Substance and Sexual Activity   • Alcohol use: Not Currently     Comment: "occasional glass of wine"   • Drug use: Yes     Types: Marijuana, Cocaine     Comment: medical, MARIJUANA   • Sexual activity: Yes     Partners: Male     Birth control/protection: Post-menopausal     Comment: PT is    Other Topics Concern   • Not on file   Social History Narrative    Lives with spouse     Social Determinants of Health     Financial Resource Strain: Not on file   Food Insecurity: No Food Insecurity (2/9/2023)    Hunger Vital Sign    • Worried About Running Out of Food in the Last Year: Never true    • Ran Out of Food in the Last Year: Never true   Transportation Needs: No Transportation Needs (2/9/2023)    PRAPARE - Transportation    • Lack of Transportation (Medical): No    • Lack of Transportation (Non-Medical):  No   Physical Activity: Not on file   Stress: Not on file   Social Connections: Not on file   Intimate Partner Violence: Not on file   Housing Stability: Low Risk  (2/9/2023)    Housing Stability Vital Sign    • Unable to Pay for Housing in the Last Year: No    • Number of Places Lived in the Last Year: 1    • Unstable Housing in the Last Year: No       Family Psychiatric History:     Family History   Problem Relation Age of Onset   • Arthritis Mother    • Coronary artery disease Mother         MI in her 63's   • Parkinsonism Father         with falls and SDH   • Parkinsonism Paternal Grandmother    • Coronary artery disease Paternal Grandfather    • Parkinsonism Paternal Aunt    • Colon cancer Family    • Depression Neg Hx             OBJECTIVE:     Vital signs in last 24 hours: There were no vitals filed for this visit. Mental Status Evaluation:    Appearance age appropriate, casually dressed   Behavior cooperative, calm   Speech normal rate, normal volume, normal pitch   Mood anxious   Affect constricted   Thought Processes organized, goal directed   Associations intact associations   Thought Content no overt delusions   Perceptual Disturbances: no auditory hallucinations, no visual hallucinations   Abnormal Thoughts  Risk Potential Suicidal ideation - None  Homicidal ideation - None  Potential for aggression - No   Orientation oriented to person, place, time/date and situation   Memory recent and remote memory grossly intact   Consciousness alert and awake   Attention Span Concentration Span attention span and concentration are age appropriate   Intellect appears to be of average intelligence   Insight intact   Judgement intact   Muscle Strength and  Gait normal muscle strength and normal muscle tone, normal gait and normal balance   Motor activity no abnormal movements   Fund of Knowledge adequate knowledge of current events  adequate fund of knowledge regarding past history  adequate fund of knowledge regarding vocabulary    Pain none   Pain Scale 0       Laboratory Results: I have personally reviewed all pertinent laboratory/tests results    Recent Labs (last 4 months):    Admission on 06/07/2023, Discharged on 06/07/2023   Component Date Value   • WBC 06/07/2023 10.07    • RBC 06/07/2023 4.68    • Hemoglobin 06/07/2023 14.4    • Hematocrit 06/07/2023 45.1 • MCV 06/07/2023 96    • MCH 06/07/2023 30.8    • MCHC 06/07/2023 31.9    • RDW 06/07/2023 14.5    • MPV 06/07/2023 9.4    • Platelets 58/40/0649 250    • nRBC 06/07/2023 0    • Neutrophils Relative 06/07/2023 61    • Immat GRANS % 06/07/2023 0    • Lymphocytes Relative 06/07/2023 31    • Monocytes Relative 06/07/2023 6    • Eosinophils Relative 06/07/2023 1    • Basophils Relative 06/07/2023 1    • Neutrophils Absolute 06/07/2023 6.22    • Immature Grans Absolute 06/07/2023 0.02    • Lymphocytes Absolute 06/07/2023 3.07    • Monocytes Absolute 06/07/2023 0.62    • Eosinophils Absolute 06/07/2023 0.06    • Basophils Absolute 06/07/2023 0.08    • Sodium 06/07/2023 134 (L)    • Potassium 06/07/2023 4.0    • Chloride 06/07/2023 95 (L)    • CO2 06/07/2023 33 (H)    • ANION GAP 06/07/2023 6    • BUN 06/07/2023 11    • Creatinine 06/07/2023 0.90    • Glucose 06/07/2023 129    • Calcium 06/07/2023 10.0    • eGFR 06/07/2023 72    • Lipase 06/07/2023 8 (L)        Suicide/Homicide Risk Assessment:    The following interventions are recommended: no intervention changes needed. Although patient's acute lethality risk is low, long-term/chronic lethality risk is mildly elevated in the presence of current diagnoses. At the current moment, Home Pena is future-oriented, forward-thinking, and demonstrates ability to act in a self-preserving manner as evidenced by volitionally presenting to the clinic today, seeking treatment. To mitigate future risk, patient should adhere to the recommendations below, avoid alcohol/illicit substance use, utilize community-based resources and familiar support and prioritize mental health treatment. Presently, patient denies active suicidal/homicidal ideation in addition to thoughts of self-injury; contracts for safety. At conclusion of evaluation, patient is amenable to the recommendations below.  Patient is amenable to calling/contacting the outpatient office including this writer if any acute adverse effects of their medication regimen arise in addition to any comments or concerns pertaining to their psychiatric management. Patient is amenable to calling/contacting crisis and/or attending to the nearest emergency department if their clinical condition deteriorates to assure their safety and stability, stating that they are able to appropriately confide in their spouse regarding their psychiatric state. Assessment/Plan:     David Porter is a 61-year-old female who carries a past psychiatric history significant for generalized anxiety disorder and major depressive disorder. She presents to establish care in his transfer of services from [facility]. She has a long history of depression and anxiety stemming back to childhood, fluctuating throughout her lifetime and has trialed various antidepressant medications. She has been on Effexor for the longest period of time which seemingly has been helpful. There is a history of abuse, in the past heavily abusing alcohol although has been sober from this for 16 years. She has had some benzodiazepine abuse as well, more recently being admitted to the hospital for encephalopathy after an overdose on Xanax. She is struggling with sleep with anxiety symptoms at this time and feels that depression is mostly stable. Her anxiety seems to stem from numerous medical conditions , most recently neuropathy and chronic back pain which seems to be limiting her physical mobility. 5/18/23 -worsening anxiety, mostly stemming from an upcoming EMG. She requests benzos initially although we discussed potential for abuse. She will try increased dose of Effexor to 225 mg and as needed Vistaril. In the future consider adjusting Effexor to Cymbalta for neuropathic pain. We will also consider a TCA if needed - perhaps nortriptyline. 7/5/23 -reporting increased anxiety symptoms. Atarax was ineffective and stopped using this as a as needed.   Also struggling with poor sleep and chronic issues with pain related to peripheral nerves. Followed with neurosurgery consultation of whom has recommendations to be evaluated by a physician at [address] [place] [address]. Relationship issues are ongoing with her , she is considering separation. She is open to therapy which she had not been previously. We will place referral and increase Seroquel to target sleep symptoms. May consider a brief use of sleep aid if Seroquel optimization is not effective. 8/21/23 Still working with insomnia, no relief from the increase in Seroquel. Also chronic pain related to recent nerve peroneal decompression of right foot. She was amenable to starting trazodone 100 mg at bedtime as needed for insomnia. If this is not effective will consider use of Lunesta which she benefited from in the past.      DSM-5 Diagnoses:     • Major depressive disorder, recurrent, moderate  • Generalized anxiety disorder  • Alcohol use disorder in sustained remission      Treatment Recommendations/Precautions:    • Effexor to 225 mg daily for depression and anxiety. • Increase Seroquel to 200 mg daily for depression augmentation and insomnia. • Trazodone 100mg HS PRN for insomnia. • Referral for therapy - pending. • Aware of need to follow up with family physician for medical issues  • Aware of 24 hour and weekend coverage for urgent situations accessed by calling [facility] main practice number    Medications Risks/Benefits      Risks, Benefits And Possible Side Effects Of Medications:    Risks, benefits, and possible side effects of medications explained to Dean Plummer including risk of parkinsonian symptoms, Tardive Dyskinesia and metabolic syndrome related to treatment with antipsychotic medications and risk of suicidality and serotonin syndrome related to treatment with antidepressants. She verbalizes understanding and agreement for treatment. .    Controlled Medication Discussion:     Not applicable    Psychotherapy Provided:     Individual psychotherapy provided: Yes  Counseling was provided during the session today for 16 minutes. Recent stressor including relationship problems, marital problems, health issues, medical problems and recent medication change discussed with Mitesh Aquino. Coping skills reviewed with Mitesh Aquino. Reassurance and supportive therapy provided.       Treatment Plan:    Completed and signed during the session: Yes - with Mitesh Aquino    This note was not shared with the patient due to reasonable likelihood of causing patient harm    Visit Time    Visit Start Time: 3:40PM  Visit Stop Time: 4;00PM  Total Visit Duration: 20 minutes        Hola Gutierrez MD 08/21/23

## 2023-09-11 ENCOUNTER — TELEPHONE (OUTPATIENT)
Dept: PSYCHIATRY | Facility: CLINIC | Age: 53
End: 2023-09-11

## 2023-09-13 ENCOUNTER — TELEPHONE (OUTPATIENT)
Dept: PSYCHIATRY | Facility: CLINIC | Age: 53
End: 2023-09-13

## 2023-09-20 ENCOUNTER — TELEPHONE (OUTPATIENT)
Dept: PSYCHIATRY | Facility: CLINIC | Age: 53
End: 2023-09-20

## 2023-09-20 NOTE — TELEPHONE ENCOUNTER
Jaylen Ramírez called back. States she needs refills on her medications and I instructed her to contact her pharmacy because scripts were all submitted with refills. She also asked about her Gabapentin that was previously prescribed by pain management for her nerve pain. Instructed her to contact her PCP for refill because she is no longer seeing pain management doctor. She is requesting the Dr Royal Bryant also prescribe something else for her anxiety and I informed her that she will need to discuss this with him at upcoming appointment on 10/2. She will call the office if she has any further concerns. Will refer to Dr Royal Bryant for review.

## 2023-09-20 NOTE — TELEPHONE ENCOUNTER
LM on Nuris's VM informing her that I was returning her call. Nursing number provided for call back.

## 2023-09-25 ENCOUNTER — TRANSITIONAL CARE MANAGEMENT (OUTPATIENT)
Dept: FAMILY MEDICINE CLINIC | Facility: CLINIC | Age: 53
End: 2023-09-25

## 2023-09-26 DIAGNOSIS — F41.1 GENERALIZED ANXIETY DISORDER: ICD-10-CM

## 2023-09-26 DIAGNOSIS — F33.2 SEVERE EPISODE OF RECURRENT MAJOR DEPRESSIVE DISORDER, WITHOUT PSYCHOTIC FEATURES (HCC): ICD-10-CM

## 2023-09-26 RX ORDER — VENLAFAXINE HYDROCHLORIDE 150 MG/1
150 CAPSULE, EXTENDED RELEASE ORAL DAILY
Qty: 30 CAPSULE | Refills: 2 | Status: SHIPPED | OUTPATIENT
Start: 2023-09-26 | End: 2023-10-26

## 2023-09-27 ENCOUNTER — OFFICE VISIT (OUTPATIENT)
Dept: FAMILY MEDICINE CLINIC | Facility: CLINIC | Age: 53
End: 2023-09-27

## 2023-09-27 ENCOUNTER — TELEPHONE (OUTPATIENT)
Dept: FAMILY MEDICINE CLINIC | Facility: CLINIC | Age: 53
End: 2023-09-27

## 2023-09-27 VITALS
DIASTOLIC BLOOD PRESSURE: 60 MMHG | HEART RATE: 112 BPM | OXYGEN SATURATION: 94 % | BODY MASS INDEX: 21.53 KG/M2 | HEIGHT: 66 IN | WEIGHT: 134 LBS | SYSTOLIC BLOOD PRESSURE: 118 MMHG

## 2023-09-27 DIAGNOSIS — Z51.89 VISIT FOR WOUND CHECK: Primary | ICD-10-CM

## 2023-09-27 DIAGNOSIS — M51.37 DDD (DEGENERATIVE DISC DISEASE), LUMBOSACRAL: ICD-10-CM

## 2023-09-27 DIAGNOSIS — I10 PRIMARY HYPERTENSION: ICD-10-CM

## 2023-09-27 DIAGNOSIS — T15.91XA FOREIGN BODY OF RIGHT EYE, INITIAL ENCOUNTER: ICD-10-CM

## 2023-09-27 RX ORDER — CELECOXIB 200 MG/1
200 CAPSULE ORAL 2 TIMES DAILY
Qty: 60 CAPSULE | Refills: 5 | Status: SHIPPED | OUTPATIENT
Start: 2023-09-27

## 2023-09-27 RX ORDER — OXYCODONE HYDROCHLORIDE 5 MG/1
TABLET ORAL
COMMUNITY
Start: 2023-09-19

## 2023-09-27 NOTE — PROGRESS NOTES
Assessment & Plan     1. Visit for wound check  Assessment & Plan:  No signs of infection were noted in the wound. A small amount of edema was noted around the wound as well as redness. I did inform patient that this most likely represents a seroma and will resolve on its own over time. She was advised to continue to take Tylenol and to ice the area as needed for pain. 2. DDD (degenerative disc disease), lumbosacral  Assessment & Plan:  Celebrex was reordered to be taken twice daily to help control pain. Orders:  -     celecoxib (CeleBREX) 200 mg capsule; Take 1 capsule (200 mg total) by mouth 2 (two) times a day    3. Foreign body of right eye, initial encounter  Assessment & Plan: This resolved with saline flush in the office today. 4. Primary hypertension  Assessment & Plan: This remains diet controlled. Tobacco Cessation Counseling: Tobacco cessation counseling was provided. The patient is sincerely urged to quit consumption of tobacco. She is not ready to quit tobacco.       Subjective     Transitional Care Management Review:   Judith Garcia is a 52 y.o. female here for TCM follow up. During the TCM phone call patient stated:  TCM Call     Date and time call was made  9/25/2023  1:29 PM    Hospital care reviewed  Records reviewed    Patient was hospitialized at  Other (comment)    [address] [facility]    Date of Admission  09/18/23    Date of discharge  09/19/23    Diagnosis  Wound Infection    Disposition  Home    Were the patients medications reviewed and updated  No    Current Symptoms  None      TCM Call     Post hospital issues  None    Should patient be enrolled in anticoag monitoring? No    Scheduled for follow up?   Yes    Did you obtain your prescribed medications  Yes    Do you need help managing your prescriptions or medications  No    Is transportation to your appointment needed  No    I have advised the patient to call PCP with any new or worsening symptoms Ruth Espinoza MA    Living Arrangements  Spouse or Significiant other    Support System  None    Are you recieving any outpatient services  No    Are you recieving home care services  No    Are you using any community resources  No    Current waiver services  No    Have you fallen in the last 12 months  No    Interperter language line needed  No    Comments  scheduled with dr Laura Napier on 5/18        Surgical incision: Patient was seen in the ED on 9/18/2023 due to concerns over a possible infection of her surgical wound from her right CPN decompression procedure completed on 8/16/2023. Per the ED note the incision was slightly red and tender to touch. An MRI was completed on the area and no signs of infection were noted in the surgical wound. The patient was kept overnight for observation and was discharged from the ED on 9/19/2023. The patient reports that she continues to note some slight redness around the wound but denies noting any discharge, warmth to touch, or increased pain in the area. Hypertension: Patient is not currently taking blood pressure medication. Patient's blood pressure is at goal in the office today. Patient denies lightheadedness, dizziness, frequent headaches, or vision changes. Eye concern: The patient reports that over the past 24 hours she has been feeling as though there is something in her eye. The patient does not wear contact lenses and denies doing any activities where there may have been fragments that could have gotten in her eye. She denies any vision changes as well. Lumbar DDD: Patient is managed on Celebrex 200 mg twice daily for chronic lower back pain. Patient is requesting a refill of this medication. Review of Systems   Constitutional: Negative for chills and fever. HENT: Negative for ear pain and sore throat. Eyes: Negative for pain and visual disturbance.         Sensation of having something under her upper right eyelid   Respiratory: Negative for cough, chest tightness, shortness of breath and wheezing. Cardiovascular: Negative for chest pain and palpitations. Gastrointestinal: Negative for abdominal pain, constipation, diarrhea, nausea and vomiting. Endocrine: Negative for cold intolerance and heat intolerance. Genitourinary: Negative for decreased urine volume, dysuria and hematuria. Musculoskeletal: Positive for back pain (lower-chronic ). Negative for arthralgias, joint swelling and myalgias. Skin: Negative for color change and rash. Allergic/Immunologic: Negative for environmental allergies. Neurological: Negative for dizziness, seizures, syncope, weakness, light-headedness, numbness and headaches. Hematological: Negative for adenopathy. Psychiatric/Behavioral: Negative for confusion, dysphoric mood, self-injury, sleep disturbance and suicidal ideas. The patient is not nervous/anxious. All other systems reviewed and are negative. Objective     /60 (BP [place] Right arm, Patient Position: Sitting, Cuff Size: Standard)   Pulse (!) 112   Ht 5' 6" (1.676 m)   Wt 60.8 kg (134 lb)   LMP 03/14/2019 (Approximate)   SpO2 94%   BMI 21.63 kg/m²      Physical Exam  Vitals and nursing note reviewed. Constitutional:       General: She is not in acute distress. Appearance: Normal appearance. She is well-developed. She is not ill-appearing. HENT:      Head: Normocephalic. Eyes:      Conjunctiva/sclera: Conjunctivae normal.      Right eye: Right conjunctiva is not injected. No chemosis, exudate or hemorrhage. Pupils:      Right eye: No corneal abrasion or fluorescein uptake. Comments: Fluorescein stain was completed on the right eye and no signs of corneal abrasion or other abnormalities were noted. Right upper eyelid was everted and no foreign bodies or other abnormalities were noted. With the assistance of a MA I did luis armando the patient's right upper eyelid and flushed the area with saline. Patient did report resolution of her symptoms after completing this. Cardiovascular:      Rate and Rhythm: Normal rate and regular rhythm. Pulses: Normal pulses. Carotid pulses are 2+ on the right side and 2+ on the left side. Radial pulses are 2+ on the right side and 2+ on the left side. Posterior tibial pulses are 2+ on the right side and 2+ on the left side. Heart sounds: Normal heart sounds. No murmur heard. Pulmonary:      Effort: Pulmonary effort is normal. No respiratory distress. Breath sounds: Examination of the right-upper field reveals wheezing. Examination of the left-upper field reveals wheezing. Examination of the right-middle field reveals wheezing. Examination of the left-middle field reveals wheezing. Examination of the right-lower field reveals wheezing. Examination of the left-lower field reveals wheezing. Wheezing (expiratory-slight) present. No decreased breath sounds, rhonchi or rales. Abdominal:      General: Abdomen is flat. Bowel sounds are normal. There is no distension. Palpations: Abdomen is soft. Tenderness: There is no abdominal tenderness. There is no guarding. Musculoskeletal:         General: No swelling. Normal range of motion. Cervical back: Normal range of motion and neck supple. Right lower leg: No edema. Left lower leg: No edema. Skin:     General: Skin is warm and dry. Capillary Refill: Capillary refill takes less than 2 seconds. Findings: Wound present. Comments: Healing, surgical incision wound noted on the lateral portion of the patient's right leg below her right knee. Some slight tenderness was noted with palpation around the wound and a small amount of redness was still noted around the wound however, no other signs of infection were noted. Area does appear to be healing appropriately. Neurological:      General: No focal deficit present.       Mental Status: She is alert and oriented to person, place, and time. Psychiatric:         Mood and Affect: Mood normal.         Behavior: Behavior normal.         Thought Content:  Thought content normal.         Judgment: Judgment normal.       Medications have been reviewed by provider in current encounter    CARLITOS Jensen

## 2023-09-27 NOTE — ASSESSMENT & PLAN NOTE
No signs of infection were noted in the wound. A small amount of edema was noted around the wound as well as redness. I did inform patient that this most likely represents a seroma and will resolve on its own over time. She was advised to continue to take Tylenol and to ice the area as needed for pain.

## 2023-09-27 NOTE — TELEPHONE ENCOUNTER
Patient called into the office stating she was told by gardenia to give him the name of the muscle relaxer she wants him to prescribe which is cyclobenzaprine. Please advise. Thank You.

## 2023-10-02 ENCOUNTER — OFFICE VISIT (OUTPATIENT)
Dept: FAMILY MEDICINE CLINIC | Facility: CLINIC | Age: 53
End: 2023-10-02
Payer: COMMERCIAL

## 2023-10-02 VITALS
HEART RATE: 88 BPM | WEIGHT: 134.4 LBS | BODY MASS INDEX: 21.6 KG/M2 | SYSTOLIC BLOOD PRESSURE: 128 MMHG | HEIGHT: 66 IN | DIASTOLIC BLOOD PRESSURE: 82 MMHG

## 2023-10-02 DIAGNOSIS — F11.20 UNCOMPLICATED OPIOID DEPENDENCE (HCC): ICD-10-CM

## 2023-10-02 DIAGNOSIS — M25.561 RIGHT KNEE PAIN, UNSPECIFIED CHRONICITY: ICD-10-CM

## 2023-10-02 DIAGNOSIS — Z51.89 VISIT FOR WOUND CHECK: ICD-10-CM

## 2023-10-02 DIAGNOSIS — I10 PRIMARY HYPERTENSION: Primary | ICD-10-CM

## 2023-10-02 DIAGNOSIS — Z72.0 TOBACCO ABUSE: ICD-10-CM

## 2023-10-02 DIAGNOSIS — G57.31 PERONEAL NEUROPATHY, RIGHT: ICD-10-CM

## 2023-10-02 DIAGNOSIS — J43.9 PULMONARY EMPHYSEMA, UNSPECIFIED EMPHYSEMA TYPE (HCC): ICD-10-CM

## 2023-10-02 PROBLEM — T79.6XXA TRAUMATIC RHABDOMYOLYSIS (HCC): Status: RESOLVED | Noted: 2022-09-19 | Resolved: 2023-10-02

## 2023-10-02 PROBLEM — E44.1 MILD PROTEIN-CALORIE MALNUTRITION (HCC): Status: RESOLVED | Noted: 2022-10-09 | Resolved: 2023-10-02

## 2023-10-02 PROCEDURE — 99214 OFFICE O/P EST MOD 30 MIN: CPT | Performed by: FAMILY MEDICINE

## 2023-10-02 NOTE — Clinical Note
I am concerned about this patient. She had peroneal nerve decompression right knee in August.  Neurosurgery was giving her oxycodone. They have now stated they will not she needs to follow-up with PCP and they did refer to pain management. Patient never went to pain management patient came here. Explained to her I will not prescribe postop narcotics. And she has been referred to pain management has never gone from neurosurgery. Patient was seeing Dr. Davis Apley in the past and Dr. Amrit Marmolejo And she "will not see either 1 of these specialist". I have referred to you but I concern she may be drug-seeking.   I see in the notes that in 2017 her contract was avoided secondary to marijuana use from one of the [facility] primary care sites

## 2023-10-02 NOTE — PATIENT INSTRUCTIONS
Patient may use her Celebrex, Tylenol and Lidoderm patch for pain  I will not prescribe narcotics for postop pain  Follow-up with pain management, Dr. Elsa Lambert, for further evaluation  Follow with neurosurgery if still with pain  Return at scheduled appointment sooner if needed  I recommend complete tobacco cessation

## 2023-10-02 NOTE — ASSESSMENT & PLAN NOTE
I recommend patient use Tylenol, Celebrex and may use Lidoderm. If needs more than that she needs to follow-up with neurosurgery will refer to pain management.

## 2023-10-02 NOTE — PROGRESS NOTES
Name: Shanika Mcclelland      : 1970      MRN: [id number]  Encounter Provider: Ashely Lutz DO  Encounter Date: 10/2/2023   Encounter department: [facility] PRIMARY CARE    Assessment & Plan     1. Hypertension, stable off medication continue to monitor  2. Peroneal neuropathy right status post decompression 2023  3. Chronic knee pain  4. Opioid use  Explained to patient I will not prescribe medication for postop purposes  I agreed to refer her to pain management patient refuses to see Dr. Emeterio Alcantara or Dr. Andrew Hastings  Patient will see Dr. Gareth Bunn, in [place]  I recommend she use Tylenol, Celebrex and may use Lidoderm patch to area patient states she has these at home and does not need a prescription  5. COPD, lungs are clear  6. Tobacco abuse, complete cessation recommended  7. Return at scheduled appointment sooner if needed    Addendum  This patient was leaving she then asked for some "tramadol" because this is "nonnarcotic". Explained to patient that yes it certainly is narcotic. And I will not do chronic narcotic therapy. She needs to follow-up with pain management possibly then they would not do chronic narcotic therapy. I am concerned that she has been through so many pain management doctors and it is documented in 2017 she broke a pain contract with [facility] physician group. I am going to send a copy of this note to Dr. Gareth Bunn. 1. Primary hypertension  Assessment & Plan:  Stable to monitor      2. Peroneal neuropathy, right  Assessment & Plan:  No sign of infection at incision patient follows neurosurgery    Orders:  -     Ambulatory referral to Spine & Pain Management; Future    3. Tobacco abuse  Assessment & Plan:  Complete cessation recommended      4. Pulmonary emphysema, unspecified emphysema type ([address])  Assessment & Plan:  Lungs are clear      5.  Right knee pain, unspecified chronicity  Assessment & Plan:  I recommend patient use Tylenol, Celebrex and may use Lidoderm. If needs more than that she needs to follow-up with neurosurgery will refer to pain management. Orders:  -     Ambulatory referral to Spine & Pain Management; Future    6. Uncomplicated opioid dependence ([address])  -     Ambulatory referral to Spine & Pain Management; Future    7. Visit for wound check  Assessment & Plan:  No sign of infection           Subjective      Patient had right common peroneal knee decompression lateral right knee 8/16/2023. September 18 possible infection was seen in the ER MRI showed no infection. Patient saw Harshal Delatorre in the office last week with no infection. Patient presents today for "pain medication". Patient wishes her "oxycodone" reordered. Patient stated that "Harshal Delatorre said I could have it but I need to see you first". Explained to patient that this is postop pain I will not treat this. She needs follow-up neurosurgery however I would refer her to pain management. Patient refuses to go back to Dr. Azael Amaya and patient refused to see Dr. José Antonio Mccall. Patient agrees to see Dr. Cleary. Patient to use Celebrex, Tylenol, and I recommend Lidoderm patch to area. Review of Systems   Constitutional: Negative. HENT: Negative. Eyes: Negative. Respiratory: Negative. Cardiovascular: Negative. Gastrointestinal: Negative. Endocrine: Negative. Genitourinary: Negative. Musculoskeletal:        HPI   Skin: Negative. Allergic/Immunologic: Negative. Neurological:        HPI   Hematological: Negative. Psychiatric/Behavioral: Negative.         Current Outpatient Medications on File Prior to Visit   Medication Sig   • busPIRone (BUSPAR) 10 mg tablet Take 1 tablet (10 mg total) by mouth 3 (three) times a day   • celecoxib (CeleBREX) 200 mg capsule Take 1 capsule (200 mg total) by mouth 2 (two) times a day   • gabapentin (NEURONTIN) 400 mg capsule 400 mg 4 (four) times a day Two tablets in am and two tablets in pm   • lidocaine (Lidoderm) 5 % Apply 1 patch topically over 12 hours daily Remove & Discard patch within 12 hours or as directed by MD   • naloxone (NARCAN) 4 mg/0.1 mL nasal spray    • omeprazole (PriLOSEC) 20 mg delayed release capsule Take 1 capsule (20 mg total) by mouth daily before breakfast   • oxyCODONE (ROXICODONE) 5 immediate release tablet    • QUEtiapine (SEROquel) 100 mg tablet Take 2 tablets (200 mg total) by mouth daily at bedtime   • senna (SENOKOT) 8.6 MG tablet Take 8.6 mg by mouth 2 (two) times a day (Patient not taking: Reported on 9/27/2023)   • traZODone (DESYREL) 100 mg tablet Take 1 tablet (100 mg total) by mouth daily at bedtime   • venlafaxine (EFFEXOR-XR) 150 mg 24 hr capsule Take 1 capsule (150 mg total) by mouth daily Take in combination with 75mg Effexor for total daily dose of (225mg)   • venlafaxine (EFFEXOR-XR) 75 mg 24 hr capsule Take 1 capsule (75 mg total) by mouth daily Take in combination with 150mg Effexor for total daily dose of (225mg)       Objective     /82   Pulse 88   Ht 5' 6" (1.676 m)   Wt 61 kg (134 lb 6.4 oz)   LMP 03/14/2019 (Approximate)   BMI 21.69 kg/m²     Physical Exam  Vitals and nursing note reviewed. Constitutional:       Appearance: Normal appearance. HENT:      Head: Normocephalic and atraumatic. Mouth/Throat:      Mouth: Mucous membranes are moist.   Eyes:      General: No scleral icterus. Neck:      Vascular: No carotid bruit. Cardiovascular:      Rate and Rhythm: Normal rate and regular rhythm. Heart sounds: Normal heart sounds. Pulmonary:      Effort: Pulmonary effort is normal.      Breath sounds: Normal breath sounds. Abdominal:      General: Bowel sounds are normal.      Palpations: Abdomen is soft. Tenderness: There is no abdominal tenderness. Musculoskeletal:         General: No tenderness. Cervical back: Neck supple. Comments: Incision urine right lateral lower knee is intact. No tenderness no open area   Skin:     General: Skin is warm and dry. Comments: Knee incision no signs of infection   Neurological:      Mental Status: She is oriented to person, place, and time.    Psychiatric:         Mood and Affect: Mood normal.       Bruce Leon, DO

## 2023-10-04 DIAGNOSIS — M48.062 SPINAL STENOSIS OF LUMBAR REGION WITH NEUROGENIC CLAUDICATION: Primary | ICD-10-CM

## 2023-10-04 RX ORDER — CYCLOBENZAPRINE HCL 10 MG
10 TABLET ORAL 3 TIMES DAILY PRN
Qty: 90 TABLET | Refills: 0 | Status: SHIPPED | OUTPATIENT
Start: 2023-10-04

## 2023-10-04 NOTE — TELEPHONE ENCOUNTER
Patient cyclobenzaprine was 10 mg tablet taking 1 tablet 3 times a day as needed for muscle spasms. Medication was discontinued back on 2/18/2020.

## 2023-10-04 NOTE — ASSESSMENT & PLAN NOTE
· CT findings of dense bibasilar consolidation, pneumonia vs atelectasis  · Continue to monitor WBC and fever curve  · Consider follow up imaging 4 = No assist / stand by assistance

## 2023-10-16 ENCOUNTER — TELEPHONE (OUTPATIENT)
Dept: FAMILY MEDICINE CLINIC | Facility: CLINIC | Age: 53
End: 2023-10-16

## 2023-10-16 DIAGNOSIS — G57.31 PERONEAL NEUROPATHY, RIGHT: Primary | ICD-10-CM

## 2023-10-16 RX ORDER — GABAPENTIN 400 MG/1
CAPSULE ORAL
Qty: 120 CAPSULE | Refills: 5 | Status: SHIPPED | OUTPATIENT
Start: 2023-10-16

## 2023-10-16 NOTE — TELEPHONE ENCOUNTER
TS, Pt is requesting a refill for Gabapentin, she states she use to get this from Pain Management but no longer sees them and you told her at last visit that you refill this.

## 2023-10-23 ENCOUNTER — TELEPHONE (OUTPATIENT)
Dept: PAIN MEDICINE | Facility: CLINIC | Age: 53
End: 2023-10-23

## 2023-10-23 NOTE — TELEPHONE ENCOUNTER
Caller: Carrie Durbin    Doctor/Office: Mandy Galvez: [phone]        Patient is requesting a transfer of care for the following reason: she did not like Dr. Merlyn Tsang         Doctor: Merlyn Tsang     Would you release patient from your care? Doctor: Wong Bowles    Would you take patient on as a patient? Referral in place from Dr. Verenice Cotto  with diagnosis:G57.31 (ICD-10-CM) - Peroneal neuropathy, right  M25.561 (ICD-10-CM) - Right knee pain, unspecified chronicity  F11.20 ([id number]) - Uncomplicated opioid dependence ([address])         Please advise,   Thank you.

## 2023-11-02 ENCOUNTER — TELEPHONE (OUTPATIENT)
Dept: NEUROSURGERY | Facility: CLINIC | Age: 53
End: 2023-11-02

## 2023-11-02 NOTE — TELEPHONE ENCOUNTER
Patient called call center and left  asking to make an appointment with geni to be evalutated for a concussion she had last year. Her licensed was medically taken away and wanted to be evaluated to get her license back. I read message below and advised her of the same info and transferred her to Neurology     "she was never seen here for her concussion and neurosurgery office did not medically take her license she would need to reach out to the doctor who took care of her concussion. She said she never followed up with anyone .  I explained neurology may be able to help her gave her number to call and ask st [address] [place] [address] neurology office on [address]." From notes by Jose M Norris

## 2023-11-03 NOTE — TELEPHONE ENCOUNTER
Pt called to see why dr Alex Knight wouldn't see her and I explain what he wrote and then when I said we are procedure based and try not to do pain meds she hung up on me.

## 2023-11-06 ENCOUNTER — TELEPHONE (OUTPATIENT)
Dept: FAMILY MEDICINE CLINIC | Facility: CLINIC | Age: 53
End: 2023-11-06

## 2023-11-06 NOTE — TELEPHONE ENCOUNTER
Per the previous discussion with the patient and Dr. Aylin Hsieh, he does not provide prescriptions for her postop. She should speak with the surgery team at [facility]. Beyond that, back pain would need further work-up, for which she could see the pain clinic, but would also recommend reevaluation for that.

## 2023-11-06 NOTE — TELEPHONE ENCOUNTER
Patient calling into office stated Dr. Harshil Marroquin office called her and informed her that there was nothing he can do for her at this point". Patient requesting pain medication and what should she do at this point.

## 2023-11-09 ENCOUNTER — TELEPHONE (OUTPATIENT)
Dept: NEUROLOGY | Facility: CLINIC | Age: 53
End: 2023-11-09

## 2023-11-13 NOTE — TELEPHONE ENCOUNTER
Pt called again, stating that she was not interested in going to [facility] for pain management as it was too far, and does TS recommend anyone closer or other things she can do?

## 2023-11-13 NOTE — TELEPHONE ENCOUNTER
Because this is for postop pain she needs to follow with her specialist at [address] [place] [address]

## 2023-11-16 DIAGNOSIS — K21.9 GASTROESOPHAGEAL REFLUX DISEASE WITHOUT ESOPHAGITIS: ICD-10-CM

## 2023-11-16 RX ORDER — OMEPRAZOLE 20 MG/1
20 CAPSULE, DELAYED RELEASE ORAL
Qty: 30 CAPSULE | Refills: 5 | Status: SHIPPED | OUTPATIENT
Start: 2023-11-16 | End: 2024-05-14

## 2023-11-16 NOTE — TELEPHONE ENCOUNTER
Good afternoon. My name is Crystal Lazcano. My date of birth is [birth year] patient of Doctor Radha Renner. I'm calling. I need a refill on my omeprazole 20 milligram. I take one each day in the morning. If you could call that into [facility] pharmacy at, I received the address here, Excuse me, I'm sorry. I just need the phone number. Sorry about that. The number is [phone] and that's the waveform is the on [facility]. Thank you.

## 2023-11-17 ENCOUNTER — TELEPHONE (OUTPATIENT)
Dept: NEUROLOGY | Facility: CLINIC | Age: 53
End: 2023-11-17

## 2023-11-17 NOTE — TELEPHONE ENCOUNTER
Patient has an appointment 11/20/23 with Dr Flavia King, NP. She doesn't have insurance right now. She would have to be self-pay. Inquired with the  how much she has to pay. She will be billed for the NP consult and pay $30 copay. She will check with her  because he is not working and call back if she needs to reschedule.

## 2023-11-20 ENCOUNTER — CONSULT (OUTPATIENT)
Dept: NEUROLOGY | Facility: CLINIC | Age: 53
End: 2023-11-20

## 2023-11-20 VITALS
WEIGHT: 136 LBS | HEART RATE: 113 BPM | DIASTOLIC BLOOD PRESSURE: 80 MMHG | SYSTOLIC BLOOD PRESSURE: 120 MMHG | BODY MASS INDEX: 21.86 KG/M2 | HEIGHT: 66 IN

## 2023-11-20 DIAGNOSIS — R93.0 ABNORMAL MRI OF HEAD: Primary | ICD-10-CM

## 2023-11-20 DIAGNOSIS — Z78.9 ALCOHOL USE: ICD-10-CM

## 2023-11-20 PROBLEM — Z09 HOSPITAL DISCHARGE FOLLOW-UP: Status: ACTIVE | Noted: 2023-11-20

## 2023-11-20 PROBLEM — F10.90 ALCOHOL USE: Status: ACTIVE | Noted: 2023-11-20

## 2023-11-20 PROCEDURE — 99204 OFFICE O/P NEW MOD 45 MIN: CPT | Performed by: STUDENT IN AN ORGANIZED HEALTH CARE EDUCATION/TRAINING PROGRAM

## 2023-11-20 RX ORDER — LANOLIN ALCOHOL/MO/W.PET/CERES
100 CREAM (GRAM) TOPICAL DAILY
Qty: 30 TABLET | Refills: 3 | Status: SHIPPED | OUTPATIENT
Start: 2023-11-20

## 2023-11-20 NOTE — ASSESSMENT & PLAN NOTE
Based on chart review, it seems that due to altered mentation and a fall with loss of consciousness 2 days prior, patient was hospitalized on 09/19/2022 and her 's license was taken away then. Patient was found to have rhabdomyolysis and had complication resulting in prolongation of hospitalization.      Plan as above

## 2023-11-20 NOTE — ASSESSMENT & PLAN NOTE
53 yo F wih hx of polysubstance use, HTN, HLD, COPD, anxiety, GERD, depression, and IBS is here for initial evaluation to fill out the [employer] to regain her 's license. Patient has not had anymore unprovoked episodes of altered mentation or LOC or falls since hospitalization in Sep, 2022.     10/14/2023 Mri brain w/wo: Development of severe periventricular and white matter T2 hyperintensities in the supratentorial region and in the lavelle without any significant mass effect, enhancement or hemorrhage,Correlate with toxic encephalopathy. Follow-up at 3 months suggested   to demonstrate resolution    Plan:  -Discussed plan with neurology attending, Dr. Stanton Figueroa. Please refer to the attestation for additional recommendation and plan  -Recommend repeating MRI brain w/wo   -Patient reports that she might not be able to afford MRI brain since she has not had any insurance at this time and is having financial hardship.  Will contact Case management/ for resources  -Recommend patient to fax or bring the [employer] forms  -Since patient is dealing with financial hardship, will have case management talk to patient first and patient can follow up with Neurology after

## 2023-11-20 NOTE — PROGRESS NOTES
Patient ID: Eddie Brooke is a 52 y.o. female. Assessment/Plan:    Abnormal MRI of head  53 yo F wih hx of polysubstance use, HTN, HLD, COPD, anxiety, GERD, depression, and IBS is here for initial evaluation to fill out the [employer] to regain her 's license. Patient has not had anymore unprovoked episodes of altered mentation or LOC or falls since hospitalization in Sep, 2022.     10/14/2023 Mri brain w/wo: Development of severe periventricular and white matter T2 hyperintensities in the supratentorial region and in the lavelle without any significant mass effect, enhancement or hemorrhage,Correlate with toxic encephalopathy. Follow-up at 3 months suggested   to demonstrate resolution    Plan:  -Discussed plan with neurology attending, Dr. Toño Kauffman. Please refer to the attestation for additional recommendation and plan  -Recommend repeating MRI brain w/wo   -Patient reports that she might not be able to afford MRI brain since she has not had any insurance at this time and is having financial hardship. Will contact Case management/ for resources  -Recommend patient to fax or bring the [employer] forms  -Since patient is dealing with financial hardship, will have case management talk to patient first and patient can follow up with Neurology after      Alcohol use  Recommend reducing alcohol intake and/or stop drinking  Recommend Thiamine 100mg daily Rima discharge follow-up  Based on chart review, it seems that due to altered mentation and a fall with loss of consciousness 2 days prior, patient was hospitalized on 09/19/2022 and her 's license was taken away then. Patient was found to have rhabdomyolysis and had complication resulting in prolongation of hospitalization. Plan as above       Diagnoses and all orders for this visit:    Abnormal MRI of head  -     thiamine 100 MG tablet;  Take 1 tablet (100 mg total) by mouth daily  -     MRI brain with and without contrast; Future    Alcohol use  -     thiamine 100 MG tablet; Take 1 tablet (100 mg total) by mouth daily         Subjective:    Cyndie Victor 53 yo F wih hx of polysubstance use, HTN, HLD, COPD, anxiety, GERD, depression, and IBS is here for initial evaluation to fill out the [employer] to regain her 's license. Patient and her  are not sure for what reason her 's license was taken away. Based on chart review, it seems that due to altered mentation and a fall with loss of consciousness 2 days prior, patient was hospitalized on 09/19/2022 and her 's license was taken away then. Patient was found to have rhabdomyolysis and had complication resulting in prolongation of hospitalization. In Feb, 2023 right before EMG appointment, patient had altered mentation and fell without head strike in the parking lot due to taking Xanax and alcohol together since patient was worried and anxious of her upcoming EMG. Currently patient is doing well. She is recovering from the surgery of peripheral nerve release of her right common peroneal nerve decompression at the fibular head in Aug, 2023. She has not had any more unprovoked falls or loss of consciousness or altered mentation since September hospitalization. She denies any other new neurological symptoms or deficits. She smokes 1/2 packs of cigarettes per day, drinks 2-3 glasses of wine per day. She does not use any other recreational drugs. She used to have medical marijuana card but not anymore.      The following portions of the patient's history were reviewed and updated as appropriate: She  has a past medical history of ARF (acute renal failure) ([address]) (9/19/2022), Aspiration pneumonitis ([address]) (10/1/2022), Bacteremia due to methicillin susceptible Staphylococcus aureus (MSSA) (9/27/2022), Chronic pain disorder, Depression, Foreign body of right eye (9/27/2023), GERD (gastroesophageal reflux disease), History of electroconvulsive therapy, Localized swelling of right upper extremity (2022), Low back pain, Self-injurious behavior, and Suicide attempt ([address]). She   Patient Active Problem List    Diagnosis Date Noted    Abnormal MRI of head 2023    Alcohol use 2023    Hospital discharge follow-up 2023    Pulmonary nodule 1 cm or greater in diameter 2023    Peroneal neuropathy, right     Foot drop, right 2023    Spinal stenosis of lumbar region with neurogenic claudication     Cyclic citrullinated peptide (CCP) antibody positive 2023    Right foot pain 2022    Ambulatory dysfunction 2022    Diarrhea 10/03/2022    Transaminitis 2022    Hyperglycemia 2022    Opioid dependence ([address]) 2021    Chronic right shoulder pain 2021    Internal hemorrhoids 2020    Adnexal cyst 2020    Gastroesophageal reflux disease without esophagitis     Insomnia 2019    Tobacco abuse 10/28/2018    Right knee pain 2018    Generalized anxiety disorder 2018    Hypertension     Mixed hyperlipidemia     Recurrent major depressive disorder, in full remission ([address])     COPD (chronic obstructive pulmonary disease) ([address])     Irritable bowel syndrome with diarrhea 2018    DDD (degenerative disc disease), lumbosacral 2014     She  has a past surgical history that includes Pancreas surgery;  section; pr esophagogastroduodenoscopy transoral diagnostic (N/A, 4/10/2018); and Colonoscopy. Her family history includes Arthritis in her mother; Colon cancer in her family; Coronary artery disease in her mother and paternal grandfather; Parkinsonism in her father, paternal aunt, and paternal grandmother. She  reports that she has been smoking cigarettes. She has a 17.50 pack-year smoking history. She has never used smokeless tobacco. She reports that she does not currently use alcohol.  She reports that she does not currently use drugs after having used the following drugs: Marijuana and Cocaine. Current Outpatient Medications   Medication Sig Dispense Refill    busPIRone (BUSPAR) 10 mg tablet Take 1 tablet (10 mg total) by mouth 3 (three) times a day 90 tablet 2    gabapentin (NEURONTIN) 400 mg capsule Take Two tablets in am and two tablets in pm 120 capsule 5    lidocaine (Lidoderm) 5 % Apply 1 patch topically over 12 hours daily Remove & Discard patch within 12 hours or as directed by MD 30 patch 0    naloxone (NARCAN) 4 mg/0.1 mL nasal spray       omeprazole (PriLOSEC) 20 mg delayed release capsule Take 1 capsule (20 mg total) by mouth daily before breakfast 30 capsule 5    QUEtiapine (SEROquel) 100 mg tablet Take 2 tablets (200 mg total) by mouth daily at bedtime 60 tablet 2    thiamine 100 MG tablet Take 1 tablet (100 mg total) by mouth daily 30 tablet 3    traZODone (DESYREL) 100 mg tablet Take 1 tablet (100 mg total) by mouth daily at bedtime 30 tablet 2    venlafaxine (EFFEXOR-XR) 150 mg 24 hr capsule Take 1 capsule (150 mg total) by mouth daily Take in combination with 75mg Effexor for total daily dose of (225mg) 30 capsule 2    venlafaxine (EFFEXOR-XR) 75 mg 24 hr capsule Take 1 capsule (75 mg total) by mouth daily Take in combination with 150mg Effexor for total daily dose of (225mg) 30 capsule 2    celecoxib (CeleBREX) 200 mg capsule Take 1 capsule (200 mg total) by mouth 2 (two) times a day (Patient not taking: Reported on 11/20/2023) 60 capsule 5    cyclobenzaprine (FLEXERIL) 10 mg tablet Take 1 tablet (10 mg total) by mouth 3 (three) times a day as needed for muscle spasms (Patient not taking: Reported on 11/20/2023) 90 tablet 0    oxyCODONE (ROXICODONE) 5 immediate release tablet  (Patient not taking: Reported on 11/20/2023)      senna (SENOKOT) 8.6 MG tablet Take 8.6 mg by mouth 2 (two) times a day (Patient not taking: Reported on 9/27/2023)       No current facility-administered medications for this visit.      Current Outpatient Medications on File Prior to Visit Medication Sig    busPIRone (BUSPAR) 10 mg tablet Take 1 tablet (10 mg total) by mouth 3 (three) times a day    gabapentin (NEURONTIN) 400 mg capsule Take Two tablets in am and two tablets in pm    lidocaine (Lidoderm) 5 % Apply 1 patch topically over 12 hours daily Remove & Discard patch within 12 hours or as directed by MD    naloxone (NARCAN) 4 mg/0.1 mL nasal spray     omeprazole (PriLOSEC) 20 mg delayed release capsule Take 1 capsule (20 mg total) by mouth daily before breakfast    QUEtiapine (SEROquel) 100 mg tablet Take 2 tablets (200 mg total) by mouth daily at bedtime    traZODone (DESYREL) 100 mg tablet Take 1 tablet (100 mg total) by mouth daily at bedtime    venlafaxine (EFFEXOR-XR) 150 mg 24 hr capsule Take 1 capsule (150 mg total) by mouth daily Take in combination with 75mg Effexor for total daily dose of (225mg)    venlafaxine (EFFEXOR-XR) 75 mg 24 hr capsule Take 1 capsule (75 mg total) by mouth daily Take in combination with 150mg Effexor for total daily dose of (225mg)    celecoxib (CeleBREX) 200 mg capsule Take 1 capsule (200 mg total) by mouth 2 (two) times a day (Patient not taking: Reported on 11/20/2023)    cyclobenzaprine (FLEXERIL) 10 mg tablet Take 1 tablet (10 mg total) by mouth 3 (three) times a day as needed for muscle spasms (Patient not taking: Reported on 11/20/2023)    oxyCODONE (ROXICODONE) 5 immediate release tablet  (Patient not taking: Reported on 11/20/2023)    senna (SENOKOT) 8.6 MG tablet Take 8.6 mg by mouth 2 (two) times a day (Patient not taking: Reported on 9/27/2023)     No current facility-administered medications on file prior to visit. She is allergic to chantix [varenicline], ibuprofen, lyrica [pregabalin], penicillins, sulfa antibiotics, and sulfasalazine. .         Objective:    Blood pressure 120/80, pulse (!) 113, height 5' 6" (1.676 m), weight 61.7 kg (136 lb), last menstrual period 03/14/2019.     Physical Exam  Constitutional:       General: She is not in acute distress. HENT:      Head: Normocephalic and atraumatic. Nose: Nose normal.      Mouth/Throat:      Mouth: Mucous membranes are moist.      Pharynx: Oropharynx is clear. No oropharyngeal exudate or posterior oropharyngeal erythema. Eyes:      General: Lids are normal.      Extraocular Movements: Extraocular movements intact. Pupils: Pupils are equal, round, and reactive to light. Cardiovascular:      Rate and Rhythm: Normal rate. Pulses: Normal pulses. Pulmonary:      Effort: Pulmonary effort is normal. No respiratory distress. Musculoskeletal:         General: Normal range of motion. Cervical back: Normal range of motion. Skin:     General: Skin is warm. Neurological:      General: No focal deficit present. Mental Status: She is alert. Deep Tendon Reflexes:      Reflex Scores:       Tricep reflexes are 3+ on the right side and 3+ on the left side. Bicep reflexes are 3+ on the right side and 3+ on the left side. Brachioradialis reflexes are 3+ on the right side and 3+ on the left side. Patellar reflexes are 3+ on the right side and 3+ on the left side. Achilles reflexes are 0 on the right side and 2+ on the left side. Psychiatric:         Speech: Speech normal.         Neurological Exam  Mental Status  Alert. Oriented to person, place and time. Speech is normal. Language is fluent with no aphasia. Cranial Nerves  CN II: Visual acuity is normal. Visual fields full to confrontation. Right funduscopic exam: not visualized. Left funduscopic exam: not visualized. CN III, IV, VI: Extraocular movements intact bilaterally. Normal lids and orbits bilaterally. Pupils equal round and reactive to light bilaterally. CN V: Facial sensation is normal.  CN VII: Full and symmetric facial movement.   CN VIII: Hearing is normal.  CN IX, X: Palate elevates symmetrically  CN XI: Shoulder shrug strength is normal.  CN XII: Tongue midline without atrophy or fasciculations. Motor  Normal muscle bulk throughout. No fasciculations present. Normal muscle tone. No abnormal involuntary movements. Strength is 5/5 in all four extremities except as noted. Right                     Left  Dorsiflexion                            3-                          5    Sensory  Light touch is normal in upper and lower extremities. Reflexes                                            Right                      Left  Brachioradialis                    3+                         3+  Biceps                                 3+                         3+  Triceps                                3+                         3+  Patellar                                3+                         3+  Achilles                                0                         2+    Right pathological reflexes: Ngozi's absent. Ankle clonus absent. Left pathological reflexes: Ngozi's absent. Ankle clonus absent. Coordination  Right: Finger-to-nose normal.Left: Finger-to-nose normal.    Gait  Casual gait: S/p peripheral nerve release due to right common peroneal nerve decompression at the fibular head in Aug, 2023. Noted mild external hip rotation when walking. ROS:    Review of Systems   Constitutional:  Negative for appetite change, fatigue and fever. HENT: Negative. Negative for hearing loss, tinnitus, trouble swallowing and voice change. Eyes: Negative. Negative for photophobia, pain and visual disturbance. Respiratory: Negative. Negative for shortness of breath. Cardiovascular: Negative. Negative for palpitations. Gastrointestinal: Negative. Negative for nausea and vomiting. Endocrine: Negative. Negative for cold intolerance. Genitourinary: Negative. Negative for dysuria, frequency and urgency. Musculoskeletal:  Negative for back pain, gait problem, myalgias and neck pain. Skin: Negative. Negative for rash. Allergic/Immunologic: Negative. Neurological: Negative. Negative for dizziness, tremors, seizures, syncope, facial asymmetry, speech difficulty, weakness, light-headedness, numbness and headaches. Hematological: Negative. Does not bruise/bleed easily. Psychiatric/Behavioral: Negative. Negative for confusion, hallucinations and sleep disturbance. I have spent a total time of 50 minutes on 11/20/23 in caring for this patient including Patient and family education, Risk factor reductions, Counseling / Coordination of care, Documenting in the medical record, Reviewing / ordering tests, medicine, procedures  , and Obtaining or reviewing history  .

## 2023-11-22 DIAGNOSIS — F33.2 SEVERE EPISODE OF RECURRENT MAJOR DEPRESSIVE DISORDER, WITHOUT PSYCHOTIC FEATURES (HCC): ICD-10-CM

## 2023-11-22 DIAGNOSIS — G47.00 INSOMNIA, UNSPECIFIED TYPE: ICD-10-CM

## 2023-11-22 DIAGNOSIS — F41.1 GENERALIZED ANXIETY DISORDER: ICD-10-CM

## 2023-11-22 RX ORDER — VENLAFAXINE HYDROCHLORIDE 150 MG/1
150 CAPSULE, EXTENDED RELEASE ORAL DAILY
Qty: 30 CAPSULE | Refills: 2 | Status: SHIPPED | OUTPATIENT
Start: 2023-11-22 | End: 2024-02-20

## 2023-11-22 RX ORDER — BUSPIRONE HYDROCHLORIDE 10 MG/1
10 TABLET ORAL 3 TIMES DAILY
Qty: 90 TABLET | Refills: 2 | Status: SHIPPED | OUTPATIENT
Start: 2023-11-22 | End: 2024-02-20

## 2023-11-22 RX ORDER — TRAZODONE HYDROCHLORIDE 100 MG/1
100 TABLET ORAL
Qty: 30 TABLET | Refills: 2 | Status: SHIPPED | OUTPATIENT
Start: 2023-11-22 | End: 2023-11-24

## 2023-11-22 RX ORDER — QUETIAPINE FUMARATE 100 MG/1
200 TABLET, FILM COATED ORAL
Qty: 60 TABLET | Refills: 2 | Status: SHIPPED | OUTPATIENT
Start: 2023-11-22 | End: 2023-11-24

## 2023-11-22 RX ORDER — VENLAFAXINE HYDROCHLORIDE 75 MG/1
75 CAPSULE, EXTENDED RELEASE ORAL DAILY
Qty: 30 CAPSULE | Refills: 2 | Status: SHIPPED | OUTPATIENT
Start: 2023-11-22 | End: 2024-02-20

## 2023-11-22 NOTE — TELEPHONE ENCOUNTER
Medication Refill Request     Name of Medication Buspar  Dose/Frequency 10MG Take 1 tablet by mouth 3 times a day  Quantity 90 Tablets  Verified pharmacy   [x]  Verified ordering Provider   [x]  Does patient have enough for the next 3 days? Yes [] No [x]  Does patient have a follow-up appointment scheduled? Yes [x] No []   If so when is appointment: 11/24/23    Medication Refill Request     Name of Medication Seroquel  Dose/Frequency 100MG Take 2 tablets by mouth daily at bedtime  Quantity 60 Tablets  Verified pharmacy   [x]  Verified ordering Provider   [x]  Does patient have enough for the next 3 days? Yes [] No [x]  Does patient have a follow-up appointment scheduled? Yes [x] No []   If so when is appointment: 11/24/23    Medication Refill Request     Name of Medication Trazodone  Dose/Frequency 100MG Take 1 tablet by mouth daily at bedtime  Quantity 30 Tablets  Verified pharmacy   [x]  Verified ordering Provider   [x]  Does patient have enough for the next 3 days? Yes [] No [x]  Does patient have a follow-up appointment scheduled? Yes [x] No []   If so when is appointment: 11/24/23    Medication Refill Request     Name of Medication Effexor XR  Dose/Frequency 150MG Take 1 capsule by mouth daily Take in combination with 75MG for total daily dose of 225MG  Quantity 30 Capsules  Verified pharmacy   [x]  Verified ordering Provider   [x]  Does patient have enough for the next 3 days? Yes [] No [x]  Does patient have a follow-up appointment scheduled? Yes [x] No []   If so when is appointment: 11/24/23    Medication Refill Request     Name of Medication Effexor XR  Dose/Frequency 75MG Take 1 capsule by mouth daily Take in combination with 150MG for total daily dose of 225MG  Quantity 30 Capsules  Verified pharmacy   [x]  Verified ordering Provider   [x]  Does patient have enough for the next 3 days? Yes [] No [x]  Does patient have a follow-up appointment scheduled?  Yes [x] No []   If so when is appointment: 11/24/23

## 2023-11-24 ENCOUNTER — TELEMEDICINE (OUTPATIENT)
Dept: PSYCHIATRY | Facility: CLINIC | Age: 53
End: 2023-11-24
Payer: COMMERCIAL

## 2023-11-24 DIAGNOSIS — F33.1 MODERATE EPISODE OF RECURRENT MAJOR DEPRESSIVE DISORDER (HCC): Primary | ICD-10-CM

## 2023-11-24 DIAGNOSIS — G47.00 INSOMNIA, UNSPECIFIED TYPE: ICD-10-CM

## 2023-11-24 PROCEDURE — 99213 OFFICE O/P EST LOW 20 MIN: CPT | Performed by: STUDENT IN AN ORGANIZED HEALTH CARE EDUCATION/TRAINING PROGRAM

## 2023-11-24 RX ORDER — QUETIAPINE FUMARATE 100 MG/1
100 TABLET, FILM COATED ORAL
Qty: 30 TABLET | Refills: 2 | Status: SHIPPED | OUTPATIENT
Start: 2023-11-24 | End: 2023-11-30

## 2023-11-24 NOTE — PSYCH
Virtual Regular Visit    Verification of patient location:    Patient is located at Home in the following state in which I hold an active license [place]      Assessment/Plan:    Problem List Items Addressed This Visit       Insomnia    Relevant Medications    Suvorexant 10 MG TABS     Other Visit Diagnoses       Moderate episode of recurrent major depressive disorder (HCC)    -  Primary    Relevant Medications    QUEtiapine (SEROquel) 100 mg tablet    Suvorexant 10 MG TABS                Reason for visit is   Chief Complaint   Patient presents with    Virtual Regular Visit          Encounter provider Casey Hinkle MD    Provider located at [address] [place] [id number] [phone]      Recent Visits  Date Type Provider Dept   11/21/23 [facility] MD [facility]   Showing recent visits within past 7 days and meeting all other requirements  Today's Visits  Date Type Provider Dept   11/24/23 Telemedicine Casey Hinkle MD [facility]   Showing today's visits and meeting all other requirements  Future Appointments  No visits were found meeting these conditions. Showing future appointments within next 150 days and meeting all other requirements       The patient was identified by name and date of birth. Shanta Hidalgo was informed that this is a telemedicine visit and that [facility]. She agrees to proceed. .  My office door was closed. No one else was in the room. She acknowledged consent and understanding of privacy and security of the video platform. The patient has agreed to participate and understands they can discontinue the visit at any time. Patient is aware this is a billable service. Subjective  Shanta Hidalgo is a 52 y.o. female .       HPI     Past Medical History:   Diagnosis Date    ARF (acute renal failure) ([address]) 2022    Aspiration pneumonitis ([address]) 10/1/2022    Bacteremia due to methicillin susceptible Staphylococcus aureus (MSSA) 2022    Chronic pain disorder     Depression     Foreign body of right eye 2023    GERD (gastroesophageal reflux disease)     History of electroconvulsive therapy     Localized swelling of right upper extremity 2022    Low back pain     Self-injurious behavior     Suicide attempt [facility])        Past Surgical History:   Procedure Laterality Date     SECTION      COLONOSCOPY      PANCREAS SURGERY      "pseudocysts" per patient's  Ricki Infante    [place] ESOPHAGOGASTRODUODENOSCOPY TRANSORAL DIAGNOSTIC N/A 4/10/2018    Procedure: EGD AND COLONOSCOPY;  Surgeon: Gavino Freeman MD;  [place] [facility];   Service: Gastroenterology       Current Outpatient Medications   Medication Sig Dispense Refill    busPIRone (BUSPAR) 10 mg tablet Take 1 tablet (10 mg total) by mouth 3 (three) times a day 90 tablet 2    gabapentin (NEURONTIN) 400 mg capsule Take Two tablets in am and two tablets in pm 120 capsule 5    lidocaine (Lidoderm) 5 % Apply 1 patch topically over 12 hours daily Remove & Discard patch within 12 hours or as directed by MD 30 patch 0    naloxone (NARCAN) 4 mg/0.1 mL nasal spray       omeprazole (PriLOSEC) 20 mg delayed release capsule Take 1 capsule (20 mg total) by mouth daily before breakfast 30 capsule 5    QUEtiapine (SEROquel) 100 mg tablet Take 1 tablet (100 mg total) by mouth daily at bedtime 30 tablet 2    Suvorexant 10 MG TABS Take 1 tablet (10 mg total) by mouth daily at bedtime 30 tablet 0    thiamine 100 MG tablet Take 1 tablet (100 mg total) by mouth daily 30 tablet 3    venlafaxine (EFFEXOR-XR) 150 mg 24 hr capsule Take 1 capsule (150 mg total) by mouth daily Take in combination with 75mg Effexor for total daily dose of (225mg) 30 capsule 2    venlafaxine (EFFEXOR-XR) 75 mg 24 hr capsule Take 1 capsule (75 mg total) by mouth daily Take in combination with 150mg Effexor for total daily dose of (225mg) 30 capsule 2     No current facility-administered medications for this visit. Allergies   Allergen Reactions    Chantix [Varenicline]     Ibuprofen Other (See Comments)     Upset stomach    Lyrica [Pregabalin] Other (See Comments)     bruising    Penicillins Other (See Comments)     ? hives    Sulfa Antibiotics Other (See Comments)     sloughing skin in mouth    Sulfasalazine        Review of Systems    Video Exam    There were no vitals filed for this visit. Physical Exam     MEDICATION MANAGEMENT NOTE        [facility]      Name and Date of Birth:  Toshia Ndiaye 52 y.o. 1970 MRN: [id number]    Date of Visit: November 24, 2023    Reason for Visit: Follow-up visit regarding medication management     Chief Complaint: "I am still not sleeping"    SUBJECTIVE:    Toshia Ndiaye is a 52 y.o., female, with a past psychiatric history significant for major depressive disorder, generalized anxiety disorder, medically complicated by IBS, COPD, hypertension, degenerative disc disease, who presents for follow-up appointment for medication management. Liz Knutson states that since their previous outpatient psychiatric appointment, she is having anxiety and trouble sleeping. She reports that much of her anxiety manifests tense, keyed up, restless and on edge. She is arguing more so with her  and this was stressful over the Thanksgiving holiday. They are still living together although ports they argue often and she contemplates . She continues to struggle with chronic pain related to her neuropathy as well as back pain. She is working to find a new pain specialist.  She reports that sleep is still problematic, averaging 2 to 3 hours per night. Trouble initiating and maintaining sleep. Ruminating about stressors often resulting in her feeling tired and anergic during the day.   She reports that she has poor concentration and focus. Appetite levels have been stable though. Denies any SI or HI. No AVH. She recently saw neurology and reports that she should be cleared to return back to driving soon which she hopes will keep her more occupied during the day and help reduce anxiety symptoms. She reports that she is not drinking as much, reports approximately 2 glasses of wine per week. Somewhat contradictory to what was reported to neurology yesterday which was 1 to 2 glasses daily. No current use of pain medications/opioids. PDMP reviewed indicated same, last prescription for opioids in September 2023. Current Rating Scores:   Current PHQ-9   PHQ-2/9 Depression Screening             Psychiatric Review Of Systems:    Appetite: no  Adverse eating: no  Weight changes: no  Insomnia/sleeplessness: decreased  Fatigue/anergy: decreased  Anhedonia/lack of interest: no  Attention/concentration: decreased  Psychomotor agitation/retardation: no  Somatic symptoms: no  Anxiety/panic attack: yes  Dalia/hypomania: no  Hopelessness/helplessness/worthlessness: no  Self-injurious behavior/high-risk behavior: no  Suicidal ideation: no  Homicidal ideation: no  Auditory hallucinations: no  Visual hallucinations: no  Other perceptual disturbances: no  Delusional thinking: no  Obsessive/compulsive symptoms: no    Review Of Systems:      Constitutional negative   ENT negative   Cardiovascular negative   Respiratory negative   Gastrointestinal negative   Genitourinary negative   Musculoskeletal negative   Integumentary negative   Neurological negative   Endocrine negative   Other Symptoms none, all other systems are negative     History Review:    The following portions of the patient's history were reviewed and updated as appropriate: allergies, current medications, past family history, past medical history, past social history, past surgical history, and problem list. Previous progress notes/assessments from other providers reviewed as available.     Past Medical History:    Past Medical History:   Diagnosis Date    ARF (acute renal failure) ([address]) 9/19/2022    Aspiration pneumonitis ([address]) 10/1/2022    Bacteremia due to methicillin susceptible Staphylococcus aureus (MSSA) 9/27/2022    Chronic pain disorder     Depression     Foreign body of right eye 9/27/2023    GERD (gastroesophageal reflux disease)     History of electroconvulsive therapy     Localized swelling of right upper extremity 9/26/2022    Low back pain     Self-injurious behavior     Suicide attempt ([address])         Allergies   Allergen Reactions    Chantix [Varenicline]     Ibuprofen Other (See Comments)     Upset stomach    Lyrica [Pregabalin] Other (See Comments)     bruising    Penicillins Other (See Comments)     ? hives    Sulfa Antibiotics Other (See Comments)     sloughing skin in mouth    Sulfasalazine        Substance Abuse History:    Social History     Substance and Sexual Activity   Alcohol Use Not Currently    Comment: "occasional glass of wine"     Social History     Substance and Sexual Activity   Drug Use Not Currently    Types: Marijuana, Cocaine    Comment: medical, MARIJUANA       Social History:    Social History     Socioeconomic History    Marital status: /Civil Union     Spouse name: Not on file    Number of children: Not on file    Years of education: Not on file    Highest education level: Not on file   Occupational History    Occupation: disability   Tobacco Use    Smoking status: Some Days     Packs/day: 0.50     Years: 35.00     Total pack years: 17.50     Types: Cigarettes    Smokeless tobacco: Never   Vaping Use    Vaping Use: Never used   Substance and Sexual Activity    Alcohol use: Not Currently     Comment: "occasional glass of wine"    Drug use: Not Currently     Types: Marijuana, Cocaine     Comment: medical, MARIJUANA    Sexual activity: Yes     Partners: Male     Birth control/protection: Post-menopausal     Comment: PT is  Other Topics Concern    Not on file   Social History Narrative    Lives with spouse     Social Determinants of Health     Financial Resource Strain: Not on file   Food Insecurity: No Food Insecurity (2/9/2023)    Hunger Vital Sign     Worried About Running Out of Food in the Last Year: Never true     Ran Out of Food in the Last Year: Never true   Transportation Needs: No Transportation Needs (2/9/2023)    PRAPARE - Transportation     Lack of Transportation (Medical): No     Lack of Transportation (Non-Medical): No   Physical Activity: Not on file   Stress: Not on file   Social Connections: Not on file   Intimate Partner Violence: Not on file   Housing Stability: Unknown (2/9/2023)    Housing Stability Vital Sign     Unable to Pay for Housing in the Last Year: No     Number of Places Lived in the Last Year: Not on file     Unstable Housing in the Last Year: No       Family Psychiatric History:     Family History   Problem Relation Age of Onset    Arthritis Mother     Coronary artery disease Mother         MI in her 63's    Parkinsonism Father         with falls and SDH    Parkinsonism Paternal Grandmother     Coronary artery disease Paternal Grandfather     Parkinsonism Paternal Aunt     Colon cancer Family     Depression Neg Hx             OBJECTIVE:     Vital signs in last 24 hours: There were no vitals filed for this visit.     Mental Status Evaluation:    Appearance age appropriate, casually dressed   Behavior cooperative, calm   Speech normal rate, normal volume, normal pitch   Mood depressed   Affect constricted   Thought Processes organized, goal directed   Associations intact associations   Thought Content no overt delusions   Perceptual Disturbances: no auditory hallucinations, no visual hallucinations   Abnormal Thoughts  Risk Potential Suicidal ideation - None  Homicidal ideation - None  Potential for aggression - No   Orientation oriented to person, place, time/date, and situation   Memory recent and remote memory grossly intact   Consciousness alert and awake   Attention Span Concentration Span attention span and concentration are age appropriate   Intellect appears to be of average intelligence   Insight intact   Judgement intact   Muscle Strength and  Gait normal muscle strength and normal muscle tone, normal gait and normal balance   Motor activity no abnormal movements   Fund of Knowledge adequate knowledge of current events  adequate fund of knowledge regarding past history  adequate fund of knowledge regarding vocabulary    Pain none   Pain Scale 0       Laboratory Results: I have personally reviewed all pertinent laboratory/tests results    Recent Labs (last 4 months):   No visits with results within 4 Month(s) from this visit.    Latest known visit with results is:   Admission on 06/07/2023, Discharged on 06/07/2023   Component Date Value    WBC 06/07/2023 10.07     RBC 06/07/2023 4.68     Hemoglobin 06/07/2023 14.4     Hematocrit 06/07/2023 45.1     MCV 06/07/2023 96     MCH 06/07/2023 30.8     MCHC 06/07/2023 31.9     RDW 06/07/2023 14.5     MPV 06/07/2023 9.4     Platelets 93/87/4984 250     nRBC 06/07/2023 0     Neutrophils Relative 06/07/2023 61     Immat GRANS % 06/07/2023 0     Lymphocytes Relative 06/07/2023 31     Monocytes Relative 06/07/2023 6     Eosinophils Relative 06/07/2023 1     Basophils Relative 06/07/2023 1     Neutrophils Absolute 06/07/2023 6.22     Immature Grans Absolute 06/07/2023 0.02     Lymphocytes Absolute 06/07/2023 3.07     Monocytes Absolute 06/07/2023 0.62     Eosinophils Absolute 06/07/2023 0.06     Basophils Absolute 06/07/2023 0.08     Sodium 06/07/2023 134 (L)     Potassium 06/07/2023 4.0     Chloride 06/07/2023 95 (L)     CO2 06/07/2023 33 (H)     ANION GAP 06/07/2023 6     BUN 06/07/2023 11     Creatinine 06/07/2023 0.90     Glucose 06/07/2023 129     Calcium 06/07/2023 10.0     eGFR 06/07/2023 72     Lipase 06/07/2023 8 (L)        Suicide/Homicide Risk Assessment:    The following interventions are recommended: no intervention changes needed. Although patient's acute lethality risk is low, long-term/chronic lethality risk is mildly elevated in the presence of current diagnoses. At the current moment, Liz Knutson is future-oriented, forward-thinking, and demonstrates ability to act in a self-preserving manner as evidenced by volitionally presenting to the clinic today, seeking treatment. To mitigate future risk, patient should adhere to the recommendations below, avoid alcohol/illicit substance use, utilize community-based resources and familiar support and prioritize mental health treatment. Presently, patient denies active suicidal/homicidal ideation in addition to thoughts of self-injury; contracts for safety. Patient is amenable to calling/contacting the outpatient office including this writer if any acute adverse effects of their medication regimen arise in addition to any comments or concerns pertaining to their psychiatric management. Patient is amenable to calling/contacting crisis and/or attending to the nearest emergency department if their clinical condition deteriorates to assure their safety and stability, stating that they are able to appropriately confide in their family regarding their psychiatric state. Assessment/Plan:     Toshia Ndiaye is a 49-year-old female who carries a past psychiatric history significant for generalized anxiety disorder and major depressive disorder. She presents to establish care in his transfer of services from [facility]. She has a long history of depression and anxiety stemming back to childhood, fluctuating throughout her lifetime and has trialed various antidepressant medications. She has been on Effexor for the longest period of time which seemingly has been helpful. There is a history of abuse, in the past heavily abusing alcohol although has been sober from this for 16 years.   She has had some benzodiazepine abuse as well, more recently being admitted to the hospital for encephalopathy after an overdose on Xanax. She is struggling with sleep with anxiety symptoms at this time and feels that depression is mostly stable. Her anxiety seems to stem from numerous medical conditions , most recently neuropathy and chronic back pain which seems to be limiting her physical mobility. 5/18/23 -worsening anxiety, mostly stemming from an upcoming EMG. She requests benzos initially although we discussed potential for abuse. She will try increased dose of Effexor to 225 mg and as needed Vistaril. In the future consider adjusting Effexor to Cymbalta for neuropathic pain. We will also consider a TCA if needed - perhaps nortriptyline. 7/5/23 -reporting increased anxiety symptoms. Atarax was ineffective and stopped using this as a as needed. Also struggling with poor sleep and chronic issues with pain related to peripheral nerves. Followed with neurosurgery consultation of whom has recommendations to be evaluated by a physician at [address] [place] [address]. Relationship issues are ongoing with her , she is considering separation. She is open to therapy which she had not been previously. We will place referral and increase Seroquel to target sleep symptoms. May consider a brief use of sleep aid if Seroquel optimization is not effective. 8/21/23 Still working with insomnia, no relief from the increase in Seroquel. Also chronic pain related to recent nerve peroneal decompression of right foot. She was amenable to starting trazodone 100 mg at bedtime as needed for insomnia. If this is not effective will consider use of Lunesta which she benefited from in the past.    11/24/23 She is having more anxiety - related to relationship issue with . She is still having trouble sleeping, averaging 2 hours per night. We discussed various medication options to help with her sleep.  Z Hypnotics are options though I am concerned about intermittent alcohol use. Seroquel and trazodone both increase risk for falls as well. We discussed reducing polypharmacy and trying Belsomra to help with sleep. Counseled about risk of sedation and risk of side effects and falls if used in combination with alcohol. She expressed understanding. DSM-5 Diagnoses:     Major depressive disorder, recurrent, moderate  Generalized anxiety disorder  Alcohol use disorder in sustained remission      Treatment Recommendations/Precautions:    Effexor to 225 mg daily for depression and anxiety. Reduce Seroquel to 100 mg daily for depression augmentation and insomnia. Discontinue Trazodone 100mg. Start Belsomra 10mg daily for insomnia. Referral for therapy - pending. Aware of need to follow up with family physician for medical issues  Aware of 24 hour and weekend coverage for urgent situations accessed by calling [facility] main practice number      Medications Risks/Benefits      Risks, Benefits And Possible Side Effects Of Medications:    Risks, benefits, and possible side effects of medications explained to Lupe Hill including risk of parkinsonian symptoms, Tardive Dyskinesia and metabolic syndrome related to treatment with antipsychotic medications, risk of suicidality and serotonin syndrome related to treatment with antidepressants, and risk of impaired next-day mental alertness, complex sleep-related behavior and dependence related to treatment with hypnotic medications. She verbalizes understanding and agreement for treatment. .    Controlled Medication Discussion:     Lupe Hill has been filling controlled prescriptions on time as prescribed according to [address] [facility] [address] [facility]  Discussed with Lupe Hill the risks of sedation, respiratory depression, impairment of ability to drive and potential for abuse and addiction related to treatment with benzodiazepine medications.  She understands risk of treatment with benzodiazepine medications, agrees to not drive if feels impaired and agrees to take medications as prescribed  Discussed with Emani Ramos Box warning on concurrent use of benzodiazepines and opioid medications including sedation, respiratory depression, coma and death.  She understands the risk of treatment with benzodiazepines in addition to opioids and wants to continue taking those medications    Psychotherapy Provided:     Individual psychotherapy provided: No    Treatment Plan:    Completed and signed during the session: Not applicable - Treatment Plan not due at this session    This note was not shared with the patient due to reasonable likelihood of causing patient harm    Visit Time    Visit Start Time: 230PM  Visit Stop Time: 3PM  Total Visit Duration:  30 minutes        Shahida Pringle MD 11/24/23

## 2023-11-29 ENCOUNTER — TELEPHONE (OUTPATIENT)
Dept: PSYCHIATRY | Facility: CLINIC | Age: 53
End: 2023-11-29

## 2023-11-29 NOTE — TELEPHONE ENCOUNTER
LM on Nuris's VM informing her that she can go back to Trazodone and discontinue the Seroquel if that is something she would like to pursue. Nursing number provided for call back for further discussion.

## 2023-11-29 NOTE — TELEPHONE ENCOUNTER
Alexandre Silva called asked if you can prescribe another medication for Seroquel 100mg and Belsomra 10mg she has no insurance and has to pay out of pocket. These medications are too expensive.

## 2023-11-30 DIAGNOSIS — G47.09 OTHER INSOMNIA: Primary | ICD-10-CM

## 2023-11-30 RX ORDER — TRAZODONE HYDROCHLORIDE 100 MG/1
150 TABLET ORAL
Qty: 45 TABLET | Refills: 1 | Status: SHIPPED | OUTPATIENT
Start: 2023-11-30 | End: 2024-01-29

## 2023-11-30 NOTE — PROGRESS NOTES
Patient amenable to discontinue Seroquel and restart Trazodone at increased dose of 150mg HS PRN for insomnia. Belsomra was not covered due to lack of insurance.

## 2023-11-30 NOTE — TELEPHONE ENCOUNTER
Buster Lundborg returned my call. She would like to go back on Trazodone and she will stop taking the Seroquel. She is requesting that a script be sent to her pharmacy. Will refer to Dr Delma Woodall for review.

## 2023-12-01 ENCOUNTER — TELEPHONE (OUTPATIENT)
Dept: PSYCHIATRY | Facility: CLINIC | Age: 53
End: 2023-12-01

## 2023-12-01 NOTE — TELEPHONE ENCOUNTER
Liam Mesa requested a call back to discuss medication. They can be reached at P# [phone]. Thank you.

## 2023-12-05 ENCOUNTER — TELEPHONE (OUTPATIENT)
Dept: NEUROLOGY | Facility: CLINIC | Age: 53
End: 2023-12-05

## 2023-12-05 NOTE — TELEPHONE ENCOUNTER
Patient called and wanted to check on the status of [employer] form that was faxed to Dr Olegario Knight office? Patient is trying to get her DL reinstated.   Please assist

## 2023-12-06 NOTE — TELEPHONE ENCOUNTER
I called the patient and left a voicemail; asking what fax number [employer] forms were sent to as there are no scanned copies of any forms in the chart. Rebecca Kash back line given.

## 2023-12-07 NOTE — TELEPHONE ENCOUNTER
2nd Attempt-I called the patient again and left a message to please return my call in regards to her forms. Latonya Moore back line given.

## 2023-12-08 NOTE — TELEPHONE ENCOUNTER
Received VM transcription, 9:05 AM:    Good morning. My name is Renetta Serrato. I'm hoping I have the right phone number. I'm a patient of Dr. Haider Dean. She's a neurologist and I had called 5 different times in the last 4 days. She was supposed to fill out some paperwork for me, for [employer], for a renewal of my 's license. Was about 3 weeks ago, going on 4. And I don't know what the status is and nobody seems to call me back when I leave a message. I got one call back from a gentleman who said he would definitely send over to her. And then a woman called and said she was in a [facility] office. But I don't know who she was. I just wondered if maybe somebody could get to the bottom of this for me. So I can get my 's license back. So if you could please give me a call. My name is Renetta Serrato. My date of birth is [birth year]. Thank you.

## 2023-12-08 NOTE — TELEPHONE ENCOUNTER
I called the patient and left another voicemail that I am here at the [facility] office until 5 pm today and to please call me back directly. [facility] back line given.

## 2023-12-08 NOTE — TELEPHONE ENCOUNTER
The patient returned my call, and she had faxed the forms on Sat., 11/25 to the [employer] fax. I did ask her to refax them today before the end of the day as the forms were never received. Patient states that she will fax them this afternoon. I did call the [employer] office and made Germain Olmstead aware.

## 2023-12-08 NOTE — TELEPHONE ENCOUNTER
3rd Attempt-I called and spoke with the patient. I asked where and when the forms were faxed, and she believed it was on a Sunday to the [employer] [employer] office, but she will check and call me back with the information.

## 2023-12-12 NOTE — TELEPHONE ENCOUNTER
I called and spoke with the patient; advised forms are complete and will be scanned into her chart. Orthopedic form would need to be filled out by another provider. Patient will go to our [employer] location to  a copy.

## 2023-12-12 NOTE — TELEPHONE ENCOUNTER
Patient has called and was checking on status of [employer] forms if they were completed. According to patient they were to be completed on 12/11/23 and she would get a callback once it was done. Please assist. Thank you!

## 2023-12-18 ENCOUNTER — TELEPHONE (OUTPATIENT)
Dept: PSYCHIATRY | Facility: CLINIC | Age: 53
End: 2023-12-18

## 2023-12-18 NOTE — TELEPHONE ENCOUNTER
LM on Nuris's VM informing her that I was returning her call regarding her message that medication change is not working.   Requested she call me back to clarify.  Nursing number provided for call back.

## 2023-12-21 ENCOUNTER — TELEPHONE (OUTPATIENT)
Dept: PSYCHIATRY | Facility: CLINIC | Age: 53
End: 2023-12-21

## 2023-12-21 NOTE — TELEPHONE ENCOUNTER
LM on Nuris's VM informing her that I was returning her call.  Is it Belsomra that is not working for her?  Nursing number provided for call back.

## 2023-12-22 ENCOUNTER — TELEPHONE (OUTPATIENT)
Dept: PSYCHIATRY | Facility: CLINIC | Age: 53
End: 2023-12-22

## 2023-12-27 ENCOUNTER — TELEPHONE (OUTPATIENT)
Dept: PSYCHIATRY | Facility: CLINIC | Age: 53
End: 2023-12-27

## 2023-12-27 NOTE — TELEPHONE ENCOUNTER
LM on Nuris's cell phone informing her that Dr Ribera does not feel comfortable prescribing Lunesta but she can try increasing her Trazodone to 2 tablets (200 MG) at bedtime.  Nursing number provided for call back if she has any questions.

## 2023-12-27 NOTE — TELEPHONE ENCOUNTER
Nuris returned my call.  Increase in Trazodone is still not working to help with sleep.  She does not have insurance.  States that years ago she was on Lunesta and is wondering if that will be an option.    Will refer to Dr Ribera for review.

## 2023-12-27 NOTE — TELEPHONE ENCOUNTER
Nuris Infante requested a call back to discuss Medication change.    They can be reached at P# [phone].       Thank you.

## 2023-12-27 NOTE — TELEPHONE ENCOUNTER
LM on home and cell phone with direct nursing number requesting that Nuris return my call to discuss her medication concerns.

## 2023-12-29 ENCOUNTER — TELEPHONE (OUTPATIENT)
Dept: PSYCHIATRY | Facility: CLINIC | Age: 53
End: 2023-12-29

## 2023-12-29 NOTE — TELEPHONE ENCOUNTER
LM on both home and cell number with direct nursing line requesting that Nuris call me back.  Previous message left on her cell phone informing her that she can try increasing her Trazodone to 200 MG at bedtime.

## 2024-01-02 ENCOUNTER — TELEPHONE (OUTPATIENT)
Dept: PSYCHIATRY | Facility: CLINIC | Age: 54
End: 2024-01-02

## 2024-01-02 NOTE — TELEPHONE ENCOUNTER
Spoke to Nuris.  She tried the increased dose of Trazodone at 200 MG last night.  States it did work, but she don't know if it is because she was just so tired.  Instructed her to continue at the 200 MG at bedtime as needed and to call the office if she has any further concerns.

## 2024-01-11 ENCOUNTER — TELEPHONE (OUTPATIENT)
Dept: PSYCHIATRY | Facility: CLINIC | Age: 54
End: 2024-01-11

## 2024-01-11 NOTE — TELEPHONE ENCOUNTER
Patient called asked if the medication Trazodone was changed from 1/2 tablet to 2 tablet at bedtime

## 2024-01-12 ENCOUNTER — TELEPHONE (OUTPATIENT)
Dept: PSYCHIATRY | Facility: CLINIC | Age: 54
End: 2024-01-12

## 2024-01-12 ENCOUNTER — TELEPHONE (OUTPATIENT)
Dept: NEUROLOGY | Facility: CLINIC | Age: 54
End: 2024-01-12

## 2024-01-12 DIAGNOSIS — G47.09 OTHER INSOMNIA: ICD-10-CM

## 2024-01-12 RX ORDER — TRAZODONE HYDROCHLORIDE 100 MG/1
200 TABLET ORAL
Qty: 60 TABLET | Refills: 1 | Status: SHIPPED | OUTPATIENT
Start: 2024-01-12 | End: 2024-03-12

## 2024-01-12 NOTE — TELEPHONE ENCOUNTER
1/10 at 2:41 pm:    I'm a patient of Dr. Guerrero. I saw her on November 3rd and I noticed some medical information  that wasn't quite right on the sheet that the nurse fills out and I just found out through my psychiatrist. I would like to talk to somebody about it. It is about something that was misprinted on one of the motor vehicle forms to restore my license. Thank you. # [phone].

## 2024-01-15 NOTE — TELEPHONE ENCOUNTER
Patient called in again today due no response from previous messages left since 1-10-24. LOV 11/20/23 w/ Ford. Patient stated that some provider notes were put incorrectly that are going to PCP and psychiatrist that are causing some issues with other providers. Please call to assist at [phone].

## 2024-01-15 NOTE — TELEPHONE ENCOUNTER
1/11/24 at 12:27    Good morning. My name is Nuris Infante. My date of birth is [birth year]. I was in to see a Doctor Ford. I believe it was in December. I needed some forms filled out so I could get my 's license back in stated. And I have a question about something that was filled out on one of these forms. And I would like if somebody could please call me back at this number. I'm sorry, I am a little tongue tie today. I would appreciate. I would like this straightened out javier it's affecting like more than just, you know what's written down. So I appreciate you. Thank you so much bye.

## 2024-01-15 NOTE — TELEPHONE ENCOUNTER
VM 1/12 at 10:04 am:    Good morning. My name is Nuris Infante, a patient of Dr. Guerrero. This  would be the 5th message I've left. I don't know what else to do to have somebody call me back. I have a problem with something that was filled out on the medical form while I was at the appointment, and it needs to be changed. So if somebody could please call me back at the number that they just left. I would appreciate it very much. Thank you. CB# [phone].  _______________________________________________    Message was already routed to appropriate team members today to follow up with the pt.

## 2024-01-15 NOTE — TELEPHONE ENCOUNTER
received vm from 1/11 at 4:10pm-Um my name is Nuris sainz. My date of birth is [birth year]. I called yesterday and I called this morning and then I'm calling back because I left a bunch of messages. I guess I need to speak somebody in the clinical department. That's why I hit this button. I need to collect some medical information that was taken from me at the last visit i had with Dr. Youngblood. So if you could please call me back. I need to get this straightened out. Thank you.  [phone]

## 2024-01-16 NOTE — TELEPHONE ENCOUNTER
I called and spoke with the patient; nothing is incorrect with the forms completed; in the last office note she states that another provider questioned her alcohol intake; lists 2-3 glasses of wine per day (should be at dinner only and not daily).  Under patient active problem list states no alcohol use.  Patient will call other provider to see if further update on note warranted.

## 2024-01-23 NOTE — TELEPHONE ENCOUNTER
DR GONZALEZ'S PT    Pt called wanting to know if she should continue her xifaxan because today is the last day  [id number];  Surgeon: Noam Barker MD;  [place] [facility] – Muskogee 0231

## 2024-01-31 ENCOUNTER — TELEPHONE (OUTPATIENT)
Dept: PSYCHIATRY | Facility: CLINIC | Age: 54
End: 2024-01-31

## 2024-01-31 ENCOUNTER — TELEMEDICINE (OUTPATIENT)
Dept: PSYCHIATRY | Facility: CLINIC | Age: 54
End: 2024-01-31

## 2024-01-31 DIAGNOSIS — G47.09 OTHER INSOMNIA: ICD-10-CM

## 2024-01-31 DIAGNOSIS — F33.2 SEVERE EPISODE OF RECURRENT MAJOR DEPRESSIVE DISORDER, WITHOUT PSYCHOTIC FEATURES (HCC): Primary | ICD-10-CM

## 2024-01-31 DIAGNOSIS — F41.1 GENERALIZED ANXIETY DISORDER: ICD-10-CM

## 2024-01-31 RX ORDER — MIRTAZAPINE 7.5 MG/1
7.5 TABLET, FILM COATED ORAL
Qty: 30 TABLET | Refills: 2 | Status: SHIPPED | OUTPATIENT
Start: 2024-01-31 | End: 2024-04-30

## 2024-01-31 NOTE — TELEPHONE ENCOUNTER
Writer reached out to offer therapy at [facility] with magalys griffin. Writer lvm to call intake for scheduling

## 2024-01-31 NOTE — PSYCH
Virtual Regular Visit    Verification of patient location:    Patient is located at Home in the following state in which I hold an active license [place]      Assessment/Plan:    Problem List Items Addressed This Visit       Generalized anxiety disorder    Relevant Medications    mirtazapine (REMERON) 7.5 MG tablet    Insomnia     Other Visit Diagnoses       Severe episode of recurrent major depressive disorder, without psychotic features (HCC)    -  Primary    Relevant Medications    mirtazapine (REMERON) 7.5 MG tablet               Reason for visit is   Chief Complaint   Patient presents with    Virtual Regular Visit          Encounter provider Abdias Ribera MD    Provider located at [facility]  [address] [place] [address] [phone]      Recent Visits  No visits were found meeting these conditions.  Showing recent visits within past 7 days and meeting all other requirements  Today's Visits  Date Type Provider Dept   01/31/24 Telemedicine Abdias Ribera MD  [facility]   Showing today's visits and meeting all other requirements  Future Appointments  No visits were found meeting these conditions.  Showing future appointments within next 150 days and meeting all other requirements       The patient was identified by name and date of birth. Nuris Infante was informed that this is a telemedicine visit and that the visit is being conducted throughthe Epic Embedded platform. She agrees to proceed..  My office door was closed. No one else was in the room.  She acknowledged consent and understanding of privacy and security of the video platform. The patient has agreed to participate and understands they can discontinue the visit at any time.    Patient is aware this is a billable service.     Subjective  Nuris Infante is a 53 y.o. female .      HPI     Past Medical History:   Diagnosis Date    ARF (acute renal failure) (HCC)  "2022    Aspiration pneumonitis (HCC) 10/1/2022    Bacteremia due to methicillin susceptible Staphylococcus aureus (MSSA) 2022    Chronic pain disorder     Depression     Foreign body of right eye 2023    GERD (gastroesophageal reflux disease)     History of electroconvulsive therapy     Localized swelling of right upper extremity 2022    Low back pain     Self-injurious behavior     Suicide attempt (HCC)        Past Surgical History:   Procedure Laterality Date     SECTION      COLONOSCOPY      PANCREAS SURGERY      \"pseudocysts\" per patient's  Ricki Infante    [place] ESOPHAGOGASTRODUODENOSCOPY TRANSORAL DIAGNOSTIC N/A 4/10/2018    Procedure: EGD AND COLONOSCOPY;  Surgeon: Gaurang De La Paz MD;  [place] [facility]  GI LAB;  Service: Gastroenterology       Current Outpatient Medications   Medication Sig Dispense Refill    busPIRone (BUSPAR) 10 mg tablet Take 1 tablet (10 mg total) by mouth 3 (three) times a day 90 tablet 2    gabapentin (NEURONTIN) 400 mg capsule Take Two tablets in am and two tablets in pm 120 capsule 5    mirtazapine (REMERON) 7.5 MG tablet Take 1 tablet (7.5 mg total) by mouth daily at bedtime 30 tablet 2    naloxone (NARCAN) 4 mg/0.1 mL nasal spray       omeprazole (PriLOSEC) 20 mg delayed release capsule Take 1 capsule (20 mg total) by mouth daily before breakfast 30 capsule 5    thiamine 100 MG tablet Take 1 tablet (100 mg total) by mouth daily 30 tablet 3    venlafaxine (EFFEXOR-XR) 150 mg 24 hr capsule Take 1 capsule (150 mg total) by mouth daily Take in combination with 75mg Effexor for total daily dose of (225mg) 30 capsule 2    venlafaxine (EFFEXOR-XR) 75 mg 24 hr capsule Take 1 capsule (75 mg total) by mouth daily Take in combination with 150mg Effexor for total daily dose of (225mg) 30 capsule 2     No current facility-administered medications for this visit.        Allergies   Allergen Reactions    Chantix [Varenicline]     Ibuprofen Other (See Comments)     Upset " "stomach    Lyrica [Pregabalin] Other (See Comments)     bruising    Penicillins Other (See Comments)     ? hives    Sulfa Antibiotics Other (See Comments)     sloughing skin in mouth    Sulfasalazine        Review of Systems    Video Exam    There were no vitals filed for this visit.    Physical Exam   MEDICATION MANAGEMENT NOTE        [facility]      Name and Date of Birth:  Nuris Infante 53 y.o. 1970 MRN: [id number]    Date of Visit: January 31, 2024    Reason for Visit: Follow-up visit regarding medication management     Chief Complaint: \"I still not sleeping\"    SUBJECTIVE:    Nuris Infante is a 53 y.o., female, with a past psychiatric history significant for major depressive disorder, generalized anxiety disorder, medically complicated by IBS, COPD, hypertension, degenerative disc disease, who presents for follow-up appointment for medication management. Nuris states that since their previous outpatient psychiatric appointment, she is still having trouble with sleep.  She reports that she is also struggling with more anxiety symptoms.  Reports that she is feeling more tense and on edge.  Worried about a variety of factors, namely finances.  She reports feeling more depressed and down, particularly given her inability to work and having some feelings of guilt about not being able to support her family or her .  She reports that in terms of sleep she is having difficulty initiating and maintaining sleep.  Averaging 3 to 4 hours per night.  Feeling tired and lethargic during the day.  She reports that she has not been drinking alcohol as frequently, stating that she only is drinking 1-2 beers per month.  She requests Lunesta as she felt that this was most effective for her sleep in the past.  No SI, HI, AVH reported.    Current Rating Scores:   Current PHQ-9   PHQ-2/9 Depression Screening             Psychiatric Review Of Systems:    Appetite: " no  Adverse eating: no  Weight changes: no  Insomnia/sleeplessness: decreased  Fatigue/anergy: decreased  Anhedonia/lack of interest: no  Attention/concentration: no  Psychomotor agitation/retardation: no  Somatic symptoms: no  Anxiety/panic attack: yes, worrying  Dalia/hypomania: no  Hopelessness/helplessness/worthlessness: no  Self-injurious behavior/high-risk behavior: no  Suicidal ideation: no  Homicidal ideation: no  Auditory hallucinations: no  Visual hallucinations: no  Other perceptual disturbances: no  Delusional thinking: no  Obsessive/compulsive symptoms: no    Review Of Systems:      Constitutional negative   ENT negative   Cardiovascular negative   Respiratory negative   Gastrointestinal negative   Genitourinary negative   Musculoskeletal negative   Integumentary negative   Neurological negative   Endocrine negative   Other Symptoms none, all other systems are negative     History Review:   The following portions of the patient's history were reviewed and updated as appropriate: allergies, current medications, past family history, past medical history, past social history, past surgical history, and problem list. Previous progress notes/assessments from other providers reviewed as available.    Past Medical History:    Past Medical History:   Diagnosis Date    ARF (acute renal failure) ([facility]) 9/19/2022    Aspiration pneumonitis ([facility]) 10/1/2022    Bacteremia due to methicillin susceptible Staphylococcus aureus (MSSA) 9/27/2022    Chronic pain disorder     Depression     Foreign body of right eye 9/27/2023    GERD (gastroesophageal reflux disease)     History of electroconvulsive therapy     Localized swelling of right upper extremity 9/26/2022    Low back pain     Self-injurious behavior     Suicide attempt ([facility])         Allergies   Allergen Reactions    Chantix [Varenicline]     Ibuprofen Other (See Comments)     Upset stomach    Lyrica [Pregabalin] Other (See Comments)     bruising    Penicillins Other  "(See Comments)     ? hives    Sulfa Antibiotics Other (See Comments)     sloughing skin in mouth    Sulfasalazine        Substance Abuse History:    Social History     Substance and Sexual Activity   Alcohol Use Not Currently    Comment: \"occasional glass of wine\"     Social History     Substance and Sexual Activity   Drug Use Not Currently    Types: Marijuana, Cocaine    Comment: medical, MARIJUANA       Social History:    Social History     Socioeconomic History    Marital status: /Civil Union     Spouse name: Not on file    Number of children: Not on file    Years of education: Not on file    Highest education level: Not on file   Occupational History    Occupation: disability   Tobacco Use    Smoking status: Some Days     Current packs/day: 0.50     Average packs/day: 0.5 packs/day for 35.0 years (17.5 ttl pk-yrs)     Types: Cigarettes    Smokeless tobacco: Never   Vaping Use    Vaping status: Never Used   Substance and Sexual Activity    Alcohol use: Not Currently     Comment: \"occasional glass of wine\"    Drug use: Not Currently     Types: Marijuana, Cocaine     Comment: medical, MARIJUANA    Sexual activity: Yes     Partners: Male     Birth control/protection: Post-menopausal     Comment: PT is    Other Topics Concern    Not on file   Social History Narrative    Lives with spouse     Social Determinants of Health     Financial Resource Strain: Not on file   Food Insecurity: No Food Insecurity (9/18/2023)    Received from [facility]    Hunger Vital Sign     Worried About Running Out of Food in the Last Year: Never true     Ran Out of Food in the Last Year: Never true   Transportation Needs: No Transportation Needs (9/18/2023)    Received from [facility]    PRAPARE - Transportation     Lack of Transportation (Medical): No     Lack of Transportation (Non-Medical): No   Physical Activity: Not on file   Stress: Not on file   Social " Connections: Not on file   Intimate Partner Violence: Not on file   Housing Stability: Low Risk  (9/18/2023)    [facility] from [facility]    Housing Stability Vital Sign     Unable to Pay for Housing in the Last Year: No     Number of Places Lived in the Last Year: 1     Unstable Housing in the Last Year: No       Family Psychiatric History:     Family History   Problem Relation Age of Onset    Arthritis Mother     Coronary artery disease Mother         MI in her 60's    Parkinsonism Father         with falls and SDH    Parkinsonism Paternal Grandmother     Coronary artery disease Paternal Grandfather     Parkinsonism Paternal Aunt     Colon cancer Family     Depression Neg Hx             OBJECTIVE:     Vital signs in last 24 hours:    There were no vitals filed for this visit.    Mental Status Evaluation:    Appearance age appropriate, casually dressed   Behavior cooperative, calm   Speech normal rate, normal volume, normal pitch   Mood anxious   Affect constricted   Thought Processes organized, goal directed   Associations intact associations   Thought Content no overt delusions   Perceptual Disturbances: no auditory hallucinations, no visual hallucinations   Abnormal Thoughts  Risk Potential Suicidal ideation - None  Homicidal ideation - None  Potential for aggression - No   Orientation oriented to person, place, time/date, and situation   Memory recent and remote memory grossly intact   Consciousness alert and awake   Attention Span Concentration Span attention span and concentration are age appropriate   Intellect appears to be of average intelligence   Insight intact   Judgement intact   Muscle Strength and  Gait normal muscle strength and normal muscle tone, normal gait and normal balance   Motor activity no abnormal movements   Fund of Knowledge adequate knowledge of current events  adequate fund of knowledge regarding past history  adequate fund of knowledge regarding  vocabulary    Pain none   Pain Scale 0       Laboratory Results: I have personally reviewed all pertinent laboratory/tests results    Recent Labs (last 4 months):   No visits with results within 4 Month(s) from this visit.   Latest known visit with results is:   Admission on 06/07/2023, Discharged on 06/07/2023   Component Date Value    WBC 06/07/2023 10.07     RBC 06/07/2023 4.68     Hemoglobin 06/07/2023 14.4     Hematocrit 06/07/2023 45.1     MCV 06/07/2023 96     MCH 06/07/2023 30.8     MCHC 06/07/2023 31.9     RDW 06/07/2023 14.5     MPV 06/07/2023 9.4     Platelets 06/07/2023 250     nRBC 06/07/2023 0     Neutrophils Relative 06/07/2023 61     Immat GRANS % 06/07/2023 0     Lymphocytes Relative 06/07/2023 31     Monocytes Relative 06/07/2023 6     Eosinophils Relative 06/07/2023 1     Basophils Relative 06/07/2023 1     Neutrophils Absolute 06/07/2023 6.22     Immature Grans Absolute 06/07/2023 0.02     Lymphocytes Absolute 06/07/2023 3.07     Monocytes Absolute 06/07/2023 0.62     Eosinophils Absolute 06/07/2023 0.06     Basophils Absolute 06/07/2023 0.08     Sodium 06/07/2023 134 (L)     Potassium 06/07/2023 4.0     Chloride 06/07/2023 95 (L)     CO2 06/07/2023 33 (H)     ANION GAP 06/07/2023 6     BUN 06/07/2023 11     Creatinine 06/07/2023 0.90     Glucose 06/07/2023 129     Calcium 06/07/2023 10.0     eGFR 06/07/2023 72     Lipase 06/07/2023 8 (L)        Suicide/Homicide Risk Assessment:    The following interventions are recommended: no intervention changes needed.     Although patient's acute lethality risk is low, long-term/chronic lethality risk is mildly elevated in the presence of current diagnoses.   At the current moment, Nuris is future-oriented, forward-thinking, and demonstrates ability to act in a self-preserving manner as evidenced by volitionally presenting to the clinic today, seeking treatment. To mitigate future risk, patient should adhere to the recommendations below, avoid  alcohol/illicit substance use, utilize community-based resources and familiar support and prioritize mental health treatment. Presently, patient denies active suicidal/homicidal ideation in addition to thoughts of self-injury; contracts for safety. Patient is amenable to calling/contacting the outpatient office including this writer if any acute adverse effects of their medication regimen arise in addition to any comments or concerns pertaining to their psychiatric management.  Patient is amenable to calling/contacting crisis and/or attending to the nearest emergency department if their clinical condition deteriorates to assure their safety and stability, stating that they are able to appropriately confide in their spouse regarding their psychiatric state.    Assessment/Plan:     Nuris Infante is a 52-year-old female who carries a past psychiatric history significant for generalized anxiety disorder and major depressive disorder.  She presents to establish care in his transfer of services from [facility].  She has a long history of depression and anxiety stemming back to childhood, fluctuating throughout her lifetime and has trialed various antidepressant medications.  She has been on Effexor for the longest period of time which seemingly has been helpful.  There is a history of abuse, in the past heavily abusing alcohol although has been sober from this for 16 years.  She has had some benzodiazepine abuse as well, more recently being admitted to the hospital for encephalopathy after an overdose on Xanax.  She is struggling with sleep with anxiety symptoms at this time and feels that depression is mostly stable.  Her anxiety seems to stem from numerous medical conditions , most recently neuropathy and chronic back pain which seems to be limiting her physical mobility.    5/18/23 -worsening anxiety, mostly stemming from an upcoming EMG.  She requests benzos initially although we discussed potential for abuse.   She will try increased dose of Effexor to 225 mg and as needed Vistaril.  In the future consider adjusting Effexor to Cymbalta for neuropathic pain.  We will also consider a TCA if needed - perhaps nortriptyline.    7/5/23 -reporting increased anxiety symptoms.  Atarax was ineffective and stopped using this as a as needed.  Also struggling with poor sleep and chronic issues with pain related to peripheral nerves.  Followed with neurosurgery consultation of whom has recommendations to be evaluated by a physician at [facility].  Relationship issues are ongoing with her , she is considering separation.  She is open to therapy which she had not been previously.  We will place referral and increase Seroquel to target sleep symptoms. May consider a brief use of sleep aid if Seroquel optimization is not effective.    8/21/23 Still working with insomnia, no relief from the increase in Seroquel.  Also chronic pain related to recent nerve peroneal decompression of right foot.  She was amenable to starting trazodone 100 mg at bedtime as needed for insomnia. If this is not effective will consider use of Lunesta which she benefited from in the past.    11/24/23 She is having more anxiety - related to relationship issue with . She is still having trouble sleeping, averaging 2 hours per night.  We discussed various medication options to help with her sleep. Z Hypnotics are options though I am concerned about intermittent alcohol use. Seroquel and trazodone both increase risk for falls as well. We discussed reducing polypharmacy and trying Belsomra to help with sleep. Counseled about risk of sedation and risk of side effects and falls if used in combination with alcohol. She expressed understanding.    1/31/24 She is feeling more anxious and depressed.  Reports that she is still not sleeping.  She was unable to get the Belsomra due to insurance issues.  She again is requesting Lunesta although given some concerns  with alcohol in the past we will defer at this time.  She was open to retrying Remeron to target depression, anxiety, sleep.  We will discontinue trazodone      DSM-5 Diagnoses:     Major depressive disorder, recurrent, moderate  Generalized anxiety disorder  Alcohol use disorder in sustained remission      Treatment Recommendations/Precautions:    Effexor to 225 mg daily for depression and anxiety.  Discontinue Trazodone 100mg.  Start Remeron 7.5mg HS for depression, insomnia.  Buspar 10mg TID for anxiety.  Referral for therapy - pending.  Aware of need to follow up with family physician for medical issues  Aware of 24 hour and weekend coverage for urgent situations accessed by calling [facility] main practice number    Medications Risks/Benefits      Risks, Benefits And Possible Side Effects Of Medications:    Risks, benefits, and possible side effects of medications explained to Nuris including risk of suicidality and serotonin syndrome related to treatment with antidepressants. She verbalizes understanding and agreement for treatment..    Controlled Medication Discussion:     Nuris has been filling controlled prescriptions on time as prescribed according to [place] Prescription Drug Monitoring Program    Psychotherapy Provided:     Individual psychotherapy provided: Yes  Counseling was provided during the session today for 16 minutes.  Recent stressor including family problems, family conflict, financial problems, occupational problems, job stress, health issues, medical problems, recent medication change, everyday stressors, and occasional anxiety discussed with Nuris.   Coping skills reviewed with Nuris.   Reassurance and supportive therapy provided.      Treatment Plan:    Completed and signed during the session: Yes - with Nuris    This note was not shared with the patient due to reasonable likelihood of causing patient harm    Visit Time    Visit Start Time:  200pm  Visit Stop Time: 227pm  Total Visit Duration:  27 minutes        Abdias Ribera MD 01/31/24

## 2024-01-31 NOTE — BH TREATMENT PLAN
TREATMENT PLAN (Medication Management Only)        [facility]    Name and Date of Birth:  Nuris Infante 53 y.o. 1970  Date of Treatment Plan: January 31, 2024  Diagnosis/Diagnoses:    1. Severe episode of recurrent major depressive disorder, without psychotic features (HCC)    2. Generalized anxiety disorder    3. Other insomnia      Strengths/Personal Resources for Self-Care: supportive family, supportive friends.  Area/Areas of need (in own words): anxiety, anxiety symptoms  1. Long Term Goal: continue improvement in anxiety.  Target Date:6 months - 7/31/2024  Person/Persons responsible for completion of goal: Nuris  2.  Short Term Objective (s) - How will we reach this goal?:   A. Provider new recommended medication/dosage changes and/or continue medication(s): continue current medications as prescribed.  B. Attend medication management appointments regularly.  C. N/A.  Target Date:6 months - 7/31/2024  Person/Persons Responsible for Completion of Goal: Nuris  Progress Towards Goals: stable  Treatment Modality: medication management every 3 months  Review due 180 days from date of this plan: 6 months - 7/31/2024  Expected length of service: maintenance  My Physician/PA/NP and I have developed this plan together and I agree to work on the goals and objectives. I understand the treatment goals that were developed for my treatment.

## 2024-01-31 NOTE — BH CRISIS PLAN
Client Name: Nuris Infante       Client YOB: 1970    Nancy Safety Plan      Creation Date: 1/31/24 Update Date: 1/1/25   Created By: Abdias Ribera MD Last Updated By: Abdias Ribera MD      Step 1: Warning Signs:   Warning Signs   isolating   spending more time in room            Step 2: Internal Coping Strategies:   Internal Coping Strategies   reading            Step 3: People and social settings that provide distraction:   Name Contact Information   , Ricki cell phone            Step 4: People whom I can ask for help during a crisis:      Name Contact Information           Step 5: Professionals or agencies I can contact during a crisis:      Clinican/Agency Name Phone Emergency Contact    call 911        [facility] Emergency Department Emergency Department Phone Emergency Department Address    [facility]          Crisis Phone Numbers:   Suicide Prevention Lifeline: Call or Text  652 Crisis Text Line: Text HOME to 810-310   Please note: Some [facility] do not have a separate number for Child/Adolescent specific crisis. If your county is not listed under Child/Adolescent, please call the adult number for your county      Adult Crisis Numbers: Child/Adolescent Crisis Numbers   [facility]: [phone] [facility]: [phone]   [facility]: [phone] [facility]: [phone]   [facility]: [phone] [place]: [phone]   [facility]: [phone] [facility]: [phone]   [facility]: [phone]   [facility]: [phone]   [facility]: [phone]   [facility]: [phone] (daytime) [phone] (after hours, weekends, holidays)      Step 6: Making the environment safer (plan for lethal means safety):   Patient did not identify any lethal methods: Yes     Optional: What is most important to me and worth living for?      Nancy Safety Plan. Lucille Eid and Connor LEUNG  Prashant. Used with permission of the authors.

## 2024-02-09 DIAGNOSIS — F41.1 GENERALIZED ANXIETY DISORDER: ICD-10-CM

## 2024-02-09 RX ORDER — BUSPIRONE HYDROCHLORIDE 10 MG/1
10 TABLET ORAL 3 TIMES DAILY
Qty: 90 TABLET | Refills: 0 | Status: SHIPPED | OUTPATIENT
Start: 2024-02-09 | End: 2024-05-09

## 2024-02-13 DIAGNOSIS — F33.2 SEVERE EPISODE OF RECURRENT MAJOR DEPRESSIVE DISORDER, WITHOUT PSYCHOTIC FEATURES (HCC): ICD-10-CM

## 2024-02-13 DIAGNOSIS — F41.1 GENERALIZED ANXIETY DISORDER: ICD-10-CM

## 2024-02-19 DIAGNOSIS — G57.31 PERONEAL NEUROPATHY, RIGHT: ICD-10-CM

## 2024-02-19 RX ORDER — VENLAFAXINE HYDROCHLORIDE 150 MG/1
150 CAPSULE, EXTENDED RELEASE ORAL DAILY
Qty: 30 CAPSULE | Refills: 0 | Status: SHIPPED | OUTPATIENT
Start: 2024-02-19 | End: 2024-05-19

## 2024-02-19 RX ORDER — GABAPENTIN 400 MG/1
CAPSULE ORAL
Qty: 120 CAPSULE | Refills: 0 | Status: SHIPPED | OUTPATIENT
Start: 2024-02-19

## 2024-02-19 RX ORDER — VENLAFAXINE HYDROCHLORIDE 75 MG/1
75 CAPSULE, EXTENDED RELEASE ORAL DAILY
Qty: 30 CAPSULE | Refills: 0 | Status: SHIPPED | OUTPATIENT
Start: 2024-02-19 | End: 2024-05-19

## 2024-03-04 ENCOUNTER — OFFICE VISIT (OUTPATIENT)
Dept: FAMILY MEDICINE CLINIC | Facility: CLINIC | Age: 54
End: 2024-03-04
Payer: COMMERCIAL

## 2024-03-04 VITALS
DIASTOLIC BLOOD PRESSURE: 74 MMHG | HEIGHT: 66 IN | WEIGHT: 137 LBS | HEART RATE: 104 BPM | SYSTOLIC BLOOD PRESSURE: 110 MMHG | BODY MASS INDEX: 22.02 KG/M2

## 2024-03-04 DIAGNOSIS — Z12.31 ENCOUNTER FOR SCREENING MAMMOGRAM FOR MALIGNANT NEOPLASM OF BREAST: ICD-10-CM

## 2024-03-04 DIAGNOSIS — F33.42 RECURRENT MAJOR DEPRESSIVE DISORDER, IN FULL REMISSION (HCC): ICD-10-CM

## 2024-03-04 DIAGNOSIS — R91.1 PULMONARY NODULE 1 CM OR GREATER IN DIAMETER: ICD-10-CM

## 2024-03-04 DIAGNOSIS — G57.31 PERONEAL NEUROPATHY, RIGHT: ICD-10-CM

## 2024-03-04 DIAGNOSIS — J43.9 PULMONARY EMPHYSEMA, UNSPECIFIED EMPHYSEMA TYPE (HCC): Primary | ICD-10-CM

## 2024-03-04 DIAGNOSIS — M48.062 SPINAL STENOSIS OF LUMBAR REGION WITH NEUROGENIC CLAUDICATION: ICD-10-CM

## 2024-03-04 DIAGNOSIS — E78.2 MIXED HYPERLIPIDEMIA: ICD-10-CM

## 2024-03-04 DIAGNOSIS — I10 PRIMARY HYPERTENSION: ICD-10-CM

## 2024-03-04 DIAGNOSIS — R93.0 ABNORMAL MRI OF HEAD: ICD-10-CM

## 2024-03-04 DIAGNOSIS — Z72.0 TOBACCO ABUSE: ICD-10-CM

## 2024-03-04 DIAGNOSIS — R74.01 TRANSAMINITIS: ICD-10-CM

## 2024-03-04 PROCEDURE — 99214 OFFICE O/P EST MOD 30 MIN: CPT | Performed by: FAMILY MEDICINE

## 2024-03-04 RX ORDER — FLUTICASONE FUROATE AND VILANTEROL 200; 25 UG/1; UG/1
1 POWDER RESPIRATORY (INHALATION) DAILY
Qty: 60 BLISTER | Refills: 5 | Status: SHIPPED | OUTPATIENT
Start: 2024-03-04 | End: 2024-04-03

## 2024-03-04 RX ORDER — ALBUTEROL SULFATE 90 UG/1
2 AEROSOL, METERED RESPIRATORY (INHALATION) EVERY 6 HOURS PRN
Qty: 18 G | Refills: 1 | Status: SHIPPED | OUTPATIENT
Start: 2024-03-04

## 2024-03-04 NOTE — PATIENT INSTRUCTIONS
Complete blood work as ordered  Start Breo 1 puff daily  Use albuterol inhaler 2 puffs every 6 hours as needed  Complete pulmonary function testing  Follow specialist further instructions specifically follow-up with neurosurgery and neurology for [employer] driving forms  Complete CT of chest 6/1/2024 as ordered  I recommend complete tobacco cessation  Recheck 6 months sooner if needed

## 2024-03-04 NOTE — PROGRESS NOTES
Name: Nuris Infante      : 1970      MRN: [id number]  Encounter Provider: Bruce Leon DO  Encounter Date: 3/4/2024   Encounter department: [facility]    Assessment & Plan     1.  COPD  PFT ordered  Breo inhaler ordered  Albuterol inhaler ordered  Return in 2 to 3 weeks if still wheezing  2.  Pulmonary nodule, repeat CT scan 2024 as planned, order was reprinted and given to the patient  3.  Hypertension, stable off medication we will monitor  4.  Peroneal neuropathy status post surgery [facility]  I recommend contacting them for driving evaluation forms  5.  MDD, stable follows with psychiatry #6 pure hyperlipidemia complete blood work #7.  Spinal stenosis lumbar area follow-up with neurosurgery  8.  Transaminitis complete blood work as ordered  9.  Abnormal MRI of head, stable patient to follow with neurology and have them complete driving evaluation forms  10.  Screening mammography ordered  11.  Patient to return in 6 months sooner if needed I recommend complete tobacco cessation      1. Pulmonary emphysema, unspecified emphysema type (HCC)  Assessment & Plan:  50 ordered, Breo ordered, albuterol ordered    Orders:  -     Complete PFT with post bronchodilator; Future  -     fluticasone-vilanterol (Breo Ellipta) 200-25 mcg/actuation inhaler; Inhale 1 puff daily Rinse mouth after use.  -     albuterol (PROVENTIL HFA,VENTOLIN HFA) 90 mcg/act inhaler; Inhale 2 puffs every 6 (six) hours as needed for wheezing    2. Encounter for screening mammogram for malignant neoplasm of breast  -     Mammo screening bilateral w 3d & cad; Future    3. Tobacco abuse  Assessment & Plan:  Complete cessation recommended      4. Pulmonary nodule 1 cm or greater in diameter  Assessment & Plan:  Reprinted CT will be due 2024      5. Primary hypertension  Assessment & Plan:  No medication at the present time we will continue to monitor      6. Peroneal neuropathy, right  Assessment &  Plan:  To follow-up with neurosurgery at [facility]      7. Mixed hyperlipidemia  Assessment & Plan:  Patient to complete blood work      8. Recurrent major depressive disorder, in full remission (HCC)  Assessment & Plan:  Stable follows psychiatry      9. Spinal stenosis of lumbar region with neurogenic claudication    10. Transaminitis  Assessment & Plan:  Patient to complete blood work      11. Abnormal MRI of head  Assessment & Plan:  Patient to follow-up with neurology             Subjective      Patient is here for routine follow-up.  Patient brings in [facility] driving forms.  Explained to patient that her neurologist will fill them out.  There is an orthopedic form to be filled out I recommend she get her neurosurgeon to fill this out.  Patient is in agreement.  Patient is still smoking.  Did not complete blood work as ordered.      Review of Systems   Constitutional: Negative.    HENT: Negative.     Eyes: Negative.    Respiratory: Negative.     Cardiovascular: Negative.    Gastrointestinal: Negative.    Endocrine: Negative.    Genitourinary: Negative.    Musculoskeletal: Negative.    Skin: Negative.    Allergic/Immunologic: Negative.    Neurological: Negative.    Hematological: Negative.    Psychiatric/Behavioral: Negative.         Current Outpatient Medications on File Prior to Visit   Medication Sig   • busPIRone (BUSPAR) 10 mg tablet Take 1 tablet (10 mg total) by mouth 3 (three) times a day   • gabapentin (NEURONTIN) 400 mg capsule TAKE 2 CAPSULES BY MOUTH ONCE DAILY IN THE MORNING AND 2 IN THE EVENING   • mirtazapine (REMERON) 7.5 MG tablet Take 1 tablet (7.5 mg total) by mouth daily at bedtime   • naloxone (NARCAN) 4 mg/0.1 mL nasal spray    • omeprazole (PriLOSEC) 20 mg delayed release capsule Take 1 capsule (20 mg total) by mouth daily before breakfast   • thiamine 100 MG tablet Take 1 tablet (100 mg total) by mouth daily   • venlafaxine (EFFEXOR-XR) 150  "mg 24 hr capsule Take 1 capsule (150 mg total) by mouth daily. Take in combination with 75mg Effexor for total daily dose of (225mg)   • venlafaxine (EFFEXOR-XR) 75 mg 24 hr capsule Take 1 capsule (75 mg total) by mouth daily. Take in combination with 150mg Effexor for total daily dose of (225mg)       Objective     /74   Pulse 104   Ht 5' 6\" (1.676 m)   Wt 62.1 kg (137 lb)   LMP 03/14/2019 (Approximate)   BMI 22.11 kg/m²     Physical Exam  Vitals and nursing note reviewed.   Constitutional:       Appearance: Normal appearance.   HENT:      Head: Normocephalic and atraumatic.      Mouth/Throat:      Mouth: Mucous membranes are moist.   Eyes:      General: No scleral icterus.  Cardiovascular:      Rate and Rhythm: Normal rate and regular rhythm.      Heart sounds: Normal heart sounds.   Pulmonary:      Effort: Pulmonary effort is normal. No respiratory distress.      Breath sounds: No stridor. Wheezing present. No rhonchi or rales.      Comments: Bilateral end expiratory wheezing  Chest:      Chest wall: No tenderness.   Abdominal:      General: Bowel sounds are normal.      Palpations: Abdomen is soft.      Tenderness: There is no abdominal tenderness.   Musculoskeletal:      Cervical back: Neck supple.      Right lower leg: No edema.      Left lower leg: No edema.   Skin:     General: Skin is warm and dry.   Neurological:      General: No focal deficit present.      Mental Status: She is alert.       Bruce Leon, DO    "

## 2024-03-07 ENCOUNTER — TELEPHONE (OUTPATIENT)
Dept: PSYCHIATRY | Facility: CLINIC | Age: 54
End: 2024-03-07

## 2024-03-07 DIAGNOSIS — F41.1 GENERALIZED ANXIETY DISORDER: ICD-10-CM

## 2024-03-07 RX ORDER — BUSPIRONE HYDROCHLORIDE 10 MG/1
10 TABLET ORAL 3 TIMES DAILY
Qty: 90 TABLET | Refills: 0 | Status: SHIPPED | OUTPATIENT
Start: 2024-03-07 | End: 2024-03-08

## 2024-03-07 NOTE — TELEPHONE ENCOUNTER
Nuris Infante requested a call back to discuss medication Buspar 10mg.    They can be reached at P# [phone].       Thank you.

## 2024-03-08 DIAGNOSIS — F41.1 GENERALIZED ANXIETY DISORDER: ICD-10-CM

## 2024-03-08 RX ORDER — BUSPIRONE HYDROCHLORIDE 10 MG/1
10 TABLET ORAL 3 TIMES DAILY
Qty: 90 TABLET | Refills: 0 | Status: SHIPPED | OUTPATIENT
Start: 2024-03-08 | End: 2024-06-06

## 2024-03-08 NOTE — TELEPHONE ENCOUNTER
Attempted to return Nuris's call on both home and cell phone.  LM on home number with direct nursing number for call back.

## 2024-03-12 DIAGNOSIS — R93.0 ABNORMAL MRI OF HEAD: ICD-10-CM

## 2024-03-12 DIAGNOSIS — Z78.9 ALCOHOL USE: ICD-10-CM

## 2024-03-12 RX ORDER — METHION/INOS/CHOL BT/B COM/LIV 110MG-86MG
100 CAPSULE ORAL DAILY
Qty: 30 TABLET | Refills: 0 | Status: SHIPPED | OUTPATIENT
Start: 2024-03-12

## 2024-03-15 DIAGNOSIS — G57.31 PERONEAL NEUROPATHY, RIGHT: ICD-10-CM

## 2024-03-15 DIAGNOSIS — F41.1 GENERALIZED ANXIETY DISORDER: ICD-10-CM

## 2024-03-15 DIAGNOSIS — F33.2 SEVERE EPISODE OF RECURRENT MAJOR DEPRESSIVE DISORDER, WITHOUT PSYCHOTIC FEATURES (HCC): ICD-10-CM

## 2024-03-15 RX ORDER — GABAPENTIN 400 MG/1
CAPSULE ORAL
Qty: 120 CAPSULE | Refills: 5 | Status: SHIPPED | OUTPATIENT
Start: 2024-03-15

## 2024-03-15 RX ORDER — VENLAFAXINE HYDROCHLORIDE 150 MG/1
150 CAPSULE, EXTENDED RELEASE ORAL DAILY
Qty: 30 CAPSULE | Refills: 0 | Status: SHIPPED | OUTPATIENT
Start: 2024-03-15 | End: 2024-06-13

## 2024-03-16 DIAGNOSIS — F33.2 SEVERE EPISODE OF RECURRENT MAJOR DEPRESSIVE DISORDER, WITHOUT PSYCHOTIC FEATURES (HCC): ICD-10-CM

## 2024-03-16 DIAGNOSIS — F41.1 GENERALIZED ANXIETY DISORDER: ICD-10-CM

## 2024-03-18 RX ORDER — VENLAFAXINE HYDROCHLORIDE 75 MG/1
75 CAPSULE, EXTENDED RELEASE ORAL DAILY
Qty: 30 CAPSULE | Refills: 0 | Status: SHIPPED | OUTPATIENT
Start: 2024-03-18 | End: 2024-06-16

## 2024-03-20 ENCOUNTER — TELEMEDICINE (OUTPATIENT)
Dept: PSYCHIATRY | Facility: CLINIC | Age: 54
End: 2024-03-20
Payer: COMMERCIAL

## 2024-03-20 DIAGNOSIS — G47.09 OTHER INSOMNIA: ICD-10-CM

## 2024-03-20 DIAGNOSIS — F33.2 SEVERE EPISODE OF RECURRENT MAJOR DEPRESSIVE DISORDER, WITHOUT PSYCHOTIC FEATURES (HCC): ICD-10-CM

## 2024-03-20 DIAGNOSIS — F41.1 GENERALIZED ANXIETY DISORDER: Primary | ICD-10-CM

## 2024-03-20 PROCEDURE — 99213 OFFICE O/P EST LOW 20 MIN: CPT | Performed by: STUDENT IN AN ORGANIZED HEALTH CARE EDUCATION/TRAINING PROGRAM

## 2024-03-20 RX ORDER — MIRTAZAPINE 15 MG/1
15 TABLET, FILM COATED ORAL
Qty: 30 TABLET | Refills: 2 | Status: SHIPPED | OUTPATIENT
Start: 2024-03-20 | End: 2024-06-18

## 2024-03-20 NOTE — PSYCH
Virtual Regular Visit    Verification of patient location:    Patient is located at Home in the following state in which I hold an active license [place]      Assessment/Plan:    Problem List Items Addressed This Visit       Generalized anxiety disorder - Primary    Insomnia     Other Visit Diagnoses       Severe episode of recurrent major depressive disorder, without psychotic features (HCC)                     Reason for visit is   Chief Complaint   Patient presents with    Virtual Regular Visit          Encounter provider Abdias Ribera MD    Provider located at [facility] [address] [address] [phone]      Recent Visits  No visits were found meeting these conditions.  Showing recent visits within past 7 days and meeting all other requirements  Today's Visits  Date Type Provider Dept   03/20/24 Telemedicine Abdias Ribera MD  Psychiatric [facility]   Showing today's visits and meeting all other requirements  Future Appointments  No visits were found meeting these conditions.  Showing future appointments within next 150 days and meeting all other requirements       The patient was identified by name and date of birth. Nuris Infante was informed that this is a telemedicine visit and that the visit is being conducted throughthe Epic Embedded platform. She agrees to proceed..  My office door was closed. No one else was in the room.  She acknowledged consent and understanding of privacy and security of the video platform. The patient has agreed to participate and understands they can discontinue the visit at any time.    Patient is aware this is a billable service.     Subjective  Nuris Infante is a 53 y.o. female .      HPI     Past Medical History:   Diagnosis Date    ARF (acute renal failure) ([facility]) 9/19/2022    Aspiration pneumonitis ([facility]) 10/1/2022    Bacteremia due to methicillin susceptible Staphylococcus aureus (MSSA)  "2022    Chronic pain disorder     Depression     Foreign body of right eye 2023    GERD (gastroesophageal reflux disease)     History of electroconvulsive therapy     Localized swelling of right upper extremity 2022    Low back pain     Self-injurious behavior     Suicide attempt (HCC)        Past Surgical History:   Procedure Laterality Date     SECTION      COLONOSCOPY      PANCREAS SURGERY      \"pseudocysts\" per patient's  Ricki Infante    IN ESOPHAGOGASTRODUODENOSCOPY TRANSORAL DIAGNOSTIC N/A 4/10/2018    Procedure: EGD AND COLONOSCOPY;  Surgeon: Gaurang De La Paz MD;  [place] [facility]  GI LAB;  Service: Gastroenterology       Current Outpatient Medications   Medication Sig Dispense Refill    albuterol (PROVENTIL HFA,VENTOLIN HFA) 90 mcg/act inhaler Inhale 2 puffs every 6 (six) hours as needed for wheezing 18 g 1    busPIRone (BUSPAR) 10 mg tablet Take 1 tablet (10 mg total) by mouth 3 (three) times a day 90 tablet 0    fluticasone-vilanterol (Breo Ellipta) 200-25 mcg/actuation inhaler Inhale 1 puff daily Rinse mouth after use. 60 blister 5    gabapentin (NEURONTIN) 400 mg capsule TAKE 2 CAPSULES BY MOUTH ONCE DAILY IN THE MORNING AND 2 IN THE EVENING 120 capsule 5    mirtazapine (REMERON) 7.5 MG tablet Take 1 tablet (7.5 mg total) by mouth daily at bedtime 30 tablet 2    naloxone (NARCAN) 4 mg/0.1 mL nasal spray       omeprazole (PriLOSEC) 20 mg delayed release capsule Take 1 capsule (20 mg total) by mouth daily before breakfast 30 capsule 5    Thiamine HCl (vitamin B-1) 100 MG TABS Take 1 tablet (100 mg total) by mouth daily 30 tablet 0    venlafaxine (EFFEXOR-XR) 150 mg 24 hr capsule Take 1 capsule (150 mg total) by mouth daily. Take in combination with 75mg Effexor for total daily dose of (225mg) 30 capsule 0    venlafaxine (EFFEXOR-XR) 75 mg 24 hr capsule Take 1 capsule (75 mg total) by mouth daily. Take in combination with 150mg Effexor for total daily dose of (225mg) 30 capsule 0 " "    No current facility-administered medications for this visit.        Allergies   Allergen Reactions    Chantix [Varenicline]     Ibuprofen Other (See Comments)     Upset stomach    Lyrica [Pregabalin] Other (See Comments)     bruising    Penicillins Other (See Comments)     ? hives    Sulfa Antibiotics Other (See Comments)     sloughing skin in mouth    Sulfasalazine        Review of Systems    Video Exam    There were no vitals filed for this visit.    Physical Exam     MEDICATION MANAGEMENT NOTE        [facility]      Name and Date of Birth:  Nuris Infante 53 y.o. 1970 MRN: [id number]    Date of Visit: March 20, 2024    Reason for Visit: Follow-up visit regarding medication management     Chief Complaint: \"I've been really depressed\"    SUBJECTIVE:    Nuris Infante is a 53 y.o., female, with a past psychiatric history significant for major depressive disorder, generalized anxiety disorder, medically complicated by IBS, COPD, hypertension, degenerative disc disease, who presents for follow-up appointment for medication management. Nuris states that since their previous outpatient psychiatric appointment, she has been feeling more depressed. Worsening depression over past 2 weeks. Feeling more overwhelmed and stressed due to finances, medical issues, and her 's unemployment. She has not been getting along with her daughter and feels that she provides a lot of support though the relationship is one sided. At times she will feel hopeless - \"it's a lot to deal with\". Sleep has been problematic; trouble falling asleep due to racing and ruminating thoughts. Remeron worked for 1 week and then stopped working. She denies any SI, HI, or A/VH. No side effects reported to the medications.    Has not been drinking or using any alcohol - \"I just feel guilty when I do\".    Current Rating Scores:   Current PHQ-9   PHQ-2/9 Depression Screening       "       Psychiatric Review Of Systems:    Appetite: decreased  Adverse eating: no  Weight changes: no  Insomnia/sleeplessness: decreased  Fatigue/anergy: decreased  Anhedonia/lack of interest: decreased  Attention/concentration: no  Psychomotor agitation/retardation: no  Somatic symptoms: no  Anxiety/panic attack: yes  Dalia/hypomania: no  Hopelessness/helplessness/worthlessness: no  Self-injurious behavior/high-risk behavior: no  Suicidal ideation: no  Homicidal ideation: no  Auditory hallucinations: no  Visual hallucinations: no  Other perceptual disturbances: no  Delusional thinking: no  Obsessive/compulsive symptoms: no    Review Of Systems:      Constitutional negative   ENT negative   Cardiovascular negative   Respiratory negative   Gastrointestinal negative   Genitourinary negative   Musculoskeletal negative   Integumentary negative   Neurological negative   Endocrine negative   Other Symptoms none, all other systems are negative     History Review:   The following portions of the patient's history were reviewed and updated as appropriate: allergies, current medications, past family history, past medical history, past social history, past surgical history, and problem list. Previous progress notes/assessments from other providers reviewed as available.    Past Medical History:    Past Medical History:   Diagnosis Date    ARF (acute renal failure) ([facility]) 9/19/2022    Aspiration pneumonitis ([facility]) 10/1/2022    Bacteremia due to methicillin susceptible Staphylococcus aureus (MSSA) 9/27/2022    Chronic pain disorder     Depression     Foreign body of right eye 9/27/2023    GERD (gastroesophageal reflux disease)     History of electroconvulsive therapy     Localized swelling of right upper extremity 9/26/2022    Low back pain     Self-injurious behavior     Suicide attempt ([facility])         Allergies   Allergen Reactions    Chantix [Varenicline]     Ibuprofen Other (See Comments)     Upset stomach    Lyrica [Pregabalin]  "Other (See Comments)     bruising    Penicillins Other (See Comments)     ? hives    Sulfa Antibiotics Other (See Comments)     sloughing skin in mouth    Sulfasalazine        Substance Abuse History:    Social History     Substance and Sexual Activity   Alcohol Use Not Currently    Comment: \"occasional glass of wine\"     Social History     Substance and Sexual Activity   Drug Use Not Currently    Types: Marijuana, Cocaine    Comment: medical, MARIJUANA       Social History:    Social History     Socioeconomic History    Marital status: /Civil Union     Spouse name: Not on file    Number of children: Not on file    Years of education: Not on file    Highest education level: Not on file   Occupational History    Occupation: disability   Tobacco Use    Smoking status: Some Days     Current packs/day: 0.50     Average packs/day: 0.5 packs/day for 35.0 years (17.5 ttl pk-yrs)     Types: Cigarettes    Smokeless tobacco: Never   Vaping Use    Vaping status: Never Used   Substance and Sexual Activity    Alcohol use: Not Currently     Comment: \"occasional glass of wine\"    Drug use: Not Currently     Types: Marijuana, Cocaine     Comment: medical, MARIJUANA    Sexual activity: Yes     Partners: Male     Birth control/protection: Post-menopausal     Comment: PT is    Other Topics Concern    Not on file   Social History Narrative    Lives with spouse     Social Determinants of Health     Financial Resource Strain: Not on file   Food Insecurity: No Food Insecurity (9/18/2023)    Received from [facility]    Hunger Vital Sign     Worried About Running Out of Food in the Last Year: Never true     Ran Out of Food in the Last Year: Never true   Transportation Needs: No Transportation Needs (9/18/2023)    Received from [facility]    PRAPARE - Transportation     Lack of Transportation (Medical): No     Lack of Transportation (Non-Medical): No   Physical " Activity: Not on file   Stress: Not on file   Social Connections: Not on file   Intimate Partner Violence: Not on file   Housing Stability: Low Risk  (9/18/2023)    Received from [facility]    Housing Stability Vital Sign     Unable to Pay for Housing in the Last Year: No     Number of Places Lived in the Last Year: 1     Unstable Housing in the Last Year: No       Family Psychiatric History:     Family History   Problem Relation Age of Onset    Arthritis Mother     Coronary artery disease Mother         MI in her 60's    Parkinsonism Father         with falls and SDH    Parkinsonism Paternal Grandmother     Coronary artery disease Paternal Grandfather     Parkinsonism Paternal Aunt     Colon cancer Family     Depression Neg Hx             OBJECTIVE:     Vital signs in last 24 hours:    There were no vitals filed for this visit.    Mental Status Evaluation:    Appearance age appropriate, casually dressed   Behavior cooperative, calm   Speech normal rate, normal volume, normal pitch   Mood depressed   Affect constricted   Thought Processes organized, goal directed   Associations intact associations   Thought Content no overt delusions   Perceptual Disturbances: no auditory hallucinations, no visual hallucinations   Abnormal Thoughts  Risk Potential Suicidal ideation - None  Homicidal ideation - None  Potential for aggression - No   Orientation oriented to person, place, time/date, and situation   Memory recent and remote memory grossly intact   Consciousness alert and awake   Attention Span Concentration Span attention span and concentration are age appropriate   Intellect appears to be of average intelligence   Insight intact   Judgement intact   Muscle Strength and  Gait normal muscle strength and normal muscle tone, normal gait and normal balance   Motor activity no abnormal movements   Fund of Knowledge adequate knowledge of current events  adequate fund of knowledge regarding past  history  adequate fund of knowledge regarding vocabulary    Pain none   Pain Scale 0       Laboratory Results: I have personally reviewed all pertinent laboratory/tests results    Recent Labs (last 4 months):   No visits with results within 4 Month(s) from this visit.   Latest known visit with results is:   Admission on 06/07/2023, Discharged on 06/07/2023   Component Date Value    WBC 06/07/2023 10.07     RBC 06/07/2023 4.68     Hemoglobin 06/07/2023 14.4     Hematocrit 06/07/2023 45.1     MCV 06/07/2023 96     MCH 06/07/2023 30.8     MCHC 06/07/2023 31.9     RDW 06/07/2023 14.5     MPV 06/07/2023 9.4     Platelets 06/07/2023 250     nRBC 06/07/2023 0     Neutrophils Relative 06/07/2023 61     Immature Grans % 06/07/2023 0     Lymphocytes Relative 06/07/2023 31     Monocytes Relative 06/07/2023 6     Eosinophils Relative 06/07/2023 1     Basophils Relative 06/07/2023 1     Neutrophils Absolute 06/07/2023 6.22     Absolute Immature Grans 06/07/2023 0.02     Absolute Lymphocytes 06/07/2023 3.07     Absolute Monocytes 06/07/2023 0.62     Eosinophils Absolute 06/07/2023 0.06     Basophils Absolute 06/07/2023 0.08     Sodium 06/07/2023 134 (L)     Potassium 06/07/2023 4.0     Chloride 06/07/2023 95 (L)     CO2 06/07/2023 33 (H)     ANION GAP 06/07/2023 6     BUN 06/07/2023 11     Creatinine 06/07/2023 0.90     Glucose 06/07/2023 129     Calcium 06/07/2023 10.0     eGFR 06/07/2023 72     Lipase 06/07/2023 8 (L)        Suicide/Homicide Risk Assessment:    The following interventions are recommended: no intervention changes needed.     Although patient's acute lethality risk is low, long-term/chronic lethality risk is mildly elevated in the presence of current diagnoses.   At the current moment, Nuris is future-oriented, forward-thinking, and demonstrates ability to act in a self-preserving manner as evidenced by volitionally presenting to the clinic today, seeking treatment. To mitigate future risk, patient should  adhere to the recommendations below, avoid alcohol/illicit substance use, utilize community-based resources and familiar support and prioritize mental health treatment. Presently, patient denies active suicidal/homicidal ideation in addition to thoughts of self-injury; contracts for safety. Patient is amenable to calling/contacting the outpatient office including this writer if any acute adverse effects of their medication regimen arise in addition to any comments or concerns pertaining to their psychiatric management.  Patient is amenable to calling/contacting crisis and/or attending to the nearest emergency department if their clinical condition deteriorates to assure their safety and stability, stating that they are able to appropriately confide in their  regarding their psychiatric state.    Assessment/Plan:     Nuris Infante is a 52-year-old female who carries a past psychiatric history significant for generalized anxiety disorder and major depressive disorder.  She presents to establish care in his transfer of services from [facility].  She has a long history of depression and anxiety stemming back to childhood, fluctuating throughout her lifetime and has trialed various antidepressant medications.  She has been on Effexor for the longest period of time which seemingly has been helpful.  There is a history of abuse, in the past heavily abusing alcohol although has been sober from this for 16 years.  She has had some benzodiazepine abuse as well, more recently being admitted to the hospital for encephalopathy after an overdose on Xanax.  She is struggling with sleep with anxiety symptoms at this time and feels that depression is mostly stable.  Her anxiety seems to stem from numerous medical conditions , most recently neuropathy and chronic back pain which seems to be limiting her physical mobility.    5/18/23 -worsening anxiety, mostly stemming from an upcoming EMG.  She requests benzos initially  although we discussed potential for abuse.  She will try increased dose of Effexor to 225 mg and as needed Vistaril.  In the future consider adjusting Effexor to Cymbalta for neuropathic pain.  We will also consider a TCA if needed - perhaps nortriptyline.    7/5/23 -reporting increased anxiety symptoms.  Atarax was ineffective and stopped using this as a as needed.  Also struggling with poor sleep and chronic issues with pain related to peripheral nerves.  Followed with neurosurgery consultation of whom has recommendations to be evaluated by a physician at [facility].  Relationship issues are ongoing with her , she is considering separation.  She is open to therapy which she had not been previously.  We will place referral and increase Seroquel to target sleep symptoms. May consider a brief use of sleep aid if Seroquel optimization is not effective.    8/21/23 Still working with insomnia, no relief from the increase in Seroquel.  Also chronic pain related to recent nerve peroneal decompression of right foot.  She was amenable to starting trazodone 100 mg at bedtime as needed for insomnia. If this is not effective will consider use of Lunesta which she benefited from in the past.    11/24/23 She is having more anxiety - related to relationship issue with . She is still having trouble sleeping, averaging 2 hours per night.  We discussed various medication options to help with her sleep. Z Hypnotics are options though I am concerned about intermittent alcohol use. Seroquel and trazodone both increase risk for falls as well. We discussed reducing polypharmacy and trying Belsomra to help with sleep. Counseled about risk of sedation and risk of side effects and falls if used in combination with alcohol. She expressed understanding.    1/31/24 She is feeling more anxious and depressed.  Reports that she is still not sleeping.  She was unable to get the Belsomra due to insurance issues.  She again is  requesting Lunesta although given some concerns with alcohol in the past we will defer at this time.  She was open to retrying Remeron to target depression, anxiety, sleep.  We will discontinue trazodone.    3/20/24 She has been more depressed over past week - mostly due to financial concerns, 's unemployment, her inability to drive, as well as relationship stress with her daughter. She feels overwhelmed from efforts she is putting into assisting with others. Sleep is still poor with trouble maintaining sleep; Remeron was helpful initially but effect dwindled. In agreement to increase it today.      DSM-5 Diagnoses:     Major depressive disorder, recurrent, moderate  Generalized anxiety disorder  Alcohol use disorder in sustained remission      Treatment Recommendations/Precautions:    Effexor to 225 mg daily for depression and anxiety.  Remeron 15mg HS for depression, insomnia.  Buspar 10mg TID for anxiety.  Referral for therapy - pending.  Aware of need to follow up with family physician for medical issues  Aware of 24 hour and weekend coverage for urgent situations accessed by calling [facility] main practice number    Medications Risks/Benefits      Risks, Benefits And Possible Side Effects Of Medications:    Risks, benefits, and possible side effects of medications explained to Nuris including risk of suicidality and serotonin syndrome related to treatment with antidepressants. She verbalizes understanding and agreement for treatment..    Controlled Medication Discussion:     Nuris has been filling controlled prescriptions on time as prescribed according to [place] Prescription Drug Monitoring Program    Psychotherapy Provided:     Individual psychotherapy provided: No    Treatment Plan:    Completed and signed during the session: Not applicable - Treatment Plan not due at this session    This note was not shared with the patient due to reasonable likelihood of causing  patient harm    Visit Time    Visit Start Time: 330pm  Visit Stop Time: 352pm  Total Visit Duration:  22 minutes        Abdias Ribera MD 03/20/24

## 2024-03-21 ENCOUNTER — TELEPHONE (OUTPATIENT)
Dept: PSYCHIATRY | Facility: CLINIC | Age: 54
End: 2024-03-21

## 2024-03-21 NOTE — TELEPHONE ENCOUNTER
Patient called for refill of Trazodone 100mg pharmacist said that it was cancelled.    Patient didn't  medication back in January.

## 2024-03-25 ENCOUNTER — TELEPHONE (OUTPATIENT)
Dept: PSYCHIATRY | Facility: CLINIC | Age: 54
End: 2024-03-25

## 2024-03-25 NOTE — TELEPHONE ENCOUNTER
Left voicemail informing patient and/or parent/guardian of the Psych Encounter form needing to be signed as a requirement from the insurance company for billing purposes. Patient can access form via [employer] website and sign electronically.     Please make patient aware this form must be signed for each visit as a requirement to continue future visits with provider.

## 2024-03-26 ENCOUNTER — TELEPHONE (OUTPATIENT)
Age: 54
End: 2024-03-26

## 2024-03-26 DIAGNOSIS — F33.42 RECURRENT MAJOR DEPRESSIVE DISORDER, IN FULL REMISSION (HCC): ICD-10-CM

## 2024-03-26 DIAGNOSIS — F41.1 GENERALIZED ANXIETY DISORDER: Primary | ICD-10-CM

## 2024-03-26 DIAGNOSIS — Z78.9 ALCOHOL USE: ICD-10-CM

## 2024-03-26 NOTE — TELEPHONE ENCOUNTER
LM on Nuris's VM informing her that Trazodone was discontinued when Remeron was initiated. Informed her that she can take Melatonin 3-6 MG at bedtime to help with sleep.  Requested she call the office if she has any further concerns.

## 2024-03-26 NOTE — TELEPHONE ENCOUNTER
Pt was referred to  Ita Medel last visit. There is a social work order in the system from July but pt said she was never contacted. Could we reach out to Ita to reach out to pt?

## 2024-04-01 ENCOUNTER — TELEPHONE (OUTPATIENT)
Dept: PSYCHIATRY | Facility: CLINIC | Age: 54
End: 2024-04-01

## 2024-04-01 NOTE — TELEPHONE ENCOUNTER
Left voicemail informing patient and/or parent/guardian of the Psych Encounter form needing to be signed as a requirement from the insurance company for billing purposes. Patient can access form via [employer] and sign electronically.     Please make patient aware this form must be signed for each visit as a requirement to continue future visits with provider.

## 2024-04-04 ENCOUNTER — TELEPHONE (OUTPATIENT)
Dept: PSYCHIATRY | Facility: CLINIC | Age: 54
End: 2024-04-04

## 2024-04-04 NOTE — TELEPHONE ENCOUNTER
Called and lvm for patient to give us a call back @ [phone] to schedule for talk therapy for [facility].     Pt has been removed from wait list due to this being the second outreach.   
No
PALPITATIONS

## 2024-04-05 ENCOUNTER — TELEPHONE (OUTPATIENT)
Dept: PSYCHIATRY | Facility: CLINIC | Age: 54
End: 2024-04-05

## 2024-04-05 NOTE — TELEPHONE ENCOUNTER
Left voicemail informing patient of the Psych Encounter form needing to be signed as a requirement from the insurance company for billing purposes. If patients contacts office, please make patient aware that the form can be accessed via [employer] to sign electronically and must be signed for each visit as a requirement to continue future visits with provider.

## 2024-04-10 DIAGNOSIS — F41.1 GENERALIZED ANXIETY DISORDER: ICD-10-CM

## 2024-04-10 DIAGNOSIS — F33.2 SEVERE EPISODE OF RECURRENT MAJOR DEPRESSIVE DISORDER, WITHOUT PSYCHOTIC FEATURES (HCC): ICD-10-CM

## 2024-04-10 RX ORDER — VENLAFAXINE HYDROCHLORIDE 150 MG/1
150 CAPSULE, EXTENDED RELEASE ORAL DAILY
Qty: 30 CAPSULE | Refills: 0 | Status: SHIPPED | OUTPATIENT
Start: 2024-04-10 | End: 2024-07-09

## 2024-04-25 ENCOUNTER — PATIENT OUTREACH (OUTPATIENT)
Dept: FAMILY MEDICINE CLINIC | Facility: CLINIC | Age: 54
End: 2024-04-25

## 2024-04-25 NOTE — PROGRESS NOTES
OP CM rcvd inbasket that pt is requesting .  Called to pts home and cell and left message again.

## 2024-04-30 ENCOUNTER — PATIENT OUTREACH (OUTPATIENT)
Dept: FAMILY MEDICINE CLINIC | Facility: CLINIC | Age: 54
End: 2024-04-30

## 2024-04-30 DIAGNOSIS — K21.9 GASTROESOPHAGEAL REFLUX DISEASE WITHOUT ESOPHAGITIS: ICD-10-CM

## 2024-05-01 ENCOUNTER — PATIENT OUTREACH (OUTPATIENT)
Dept: FAMILY MEDICINE CLINIC | Facility: CLINIC | Age: 54
End: 2024-05-01

## 2024-05-01 DIAGNOSIS — Z59.89 INSURANCE COVERAGE PROBLEMS: Primary | ICD-10-CM

## 2024-05-01 RX ORDER — OMEPRAZOLE 20 MG/1
20 CAPSULE, DELAYED RELEASE ORAL
Qty: 30 CAPSULE | Refills: 0 | Status: SHIPPED | OUTPATIENT
Start: 2024-05-01 | End: 2024-10-28

## 2024-05-01 SDOH — ECONOMIC STABILITY - INCOME SECURITY: OTHER PROBLEMS RELATED TO HOUSING AND ECONOMIC CIRCUMSTANCES: Z59.89

## 2024-05-01 NOTE — PROGRESS NOTES
OP CM rcvd call back from pt who states she was told her medicaid is inactive.  Pt has not used an inhaler for a very long time due to cost.  Pt does have Good RX card and is able to afford her meds other than her inhaler.      OP CM went over income guidelines for medicaid and pt states she meets criteria.  Pts hsb is now unemployed.  Pt states she is interested in applying for medicaid and SNAP.  Also sent request to financial counselors to mail pt a josiah care application.         Pt has OP MH services through [facility].      Referral placed to [facility] for medicaid and SNAP application and Resp  for assistance with Breo Ellipta.      OP CM will remain available.

## 2024-05-02 ENCOUNTER — PATIENT OUTREACH (OUTPATIENT)
Dept: FAMILY MEDICINE CLINIC | Facility: CLINIC | Age: 54
End: 2024-05-02

## 2024-05-02 NOTE — PROGRESS NOTES
received return call from pt.  OC discussed the reason for my call.    Pt stated that she would like assistance with applying for medical assistance.  Pt stated that she does see a therapist and that her visits are covered but when she tried to get her medications the pharmacy stated that she did not have any insurance.    OC did check on the [facility] website but it stated that the prescriber was not found.  OC will call pt tomorrow 5/3 to schedule a home visit to complete the application.

## 2024-05-03 ENCOUNTER — PATIENT OUTREACH (OUTPATIENT)
Dept: CASE MANAGEMENT | Facility: OTHER | Age: 54
End: 2024-05-03

## 2024-05-03 ENCOUNTER — PATIENT OUTREACH (OUTPATIENT)
Dept: FAMILY MEDICINE CLINIC | Facility: CLINIC | Age: 54
End: 2024-05-03

## 2024-05-03 NOTE — PROGRESS NOTES
OP RT CM received incoming in basket message and referral.  I will outreach patient later today regarding her inhalers/COPD.   __________________________________  .OP RT CM called and left a VM requesting a return phone call. I will outreach in two weeks unless I hear back from patient prior.

## 2024-05-03 NOTE — PROGRESS NOTES
placed follow up to pt to scheduled a day and time for a home visit to complete medical assistance application.     OC and pt has scheduled the home visit for Monday, 5/6 to complete application.

## 2024-05-03 NOTE — PROGRESS NOTES
OP CM rcvd in[place] that covering CMOC was able to get in touch with pt and will be meeting with her today to complete medicaid application.  OP CM to remain available.

## 2024-05-06 ENCOUNTER — PATIENT OUTREACH (OUTPATIENT)
Dept: FAMILY MEDICINE CLINIC | Facility: CLINIC | Age: 54
End: 2024-05-06

## 2024-05-06 DIAGNOSIS — F41.1 GENERALIZED ANXIETY DISORDER: ICD-10-CM

## 2024-05-06 RX ORDER — BUSPIRONE HYDROCHLORIDE 10 MG/1
10 TABLET ORAL 3 TIMES DAILY
Qty: 90 TABLET | Refills: 0 | Status: SHIPPED | OUTPATIENT
Start: 2024-05-06 | End: 2024-08-04

## 2024-05-06 NOTE — PROGRESS NOTES
completed home visit to gather financial documents to complete medial assistance application.        discuss with Pt about requirements to filing out a medical assistance application. Pt will gather financial documents that is required by the county. Once they have gathered their financial document they will email it to me.OC provided email address to forward the documents.     OC will be completing requested application once I have received require documents.     Next outreach will be in two weeks.

## 2024-05-08 ENCOUNTER — PATIENT OUTREACH (OUTPATIENT)
Dept: CASE MANAGEMENT | Facility: OTHER | Age: 54
End: 2024-05-08

## 2024-05-08 DIAGNOSIS — Z72.0 TOBACCO ABUSE: Primary | ICD-10-CM

## 2024-05-08 DIAGNOSIS — F41.1 GENERALIZED ANXIETY DISORDER: ICD-10-CM

## 2024-05-08 DIAGNOSIS — F33.2 SEVERE EPISODE OF RECURRENT MAJOR DEPRESSIVE DISORDER, WITHOUT PSYCHOTIC FEATURES (HCC): ICD-10-CM

## 2024-05-08 RX ORDER — VENLAFAXINE HYDROCHLORIDE 150 MG/1
150 CAPSULE, EXTENDED RELEASE ORAL DAILY
Qty: 30 CAPSULE | Refills: 0 | Status: SHIPPED | OUTPATIENT
Start: 2024-05-08 | End: 2024-08-06

## 2024-05-08 NOTE — PROGRESS NOTES
OP RT CM received incoming call from Nuris. She was returning my call from last week.  She stated she is currently without health insurance and has COPD. She does not see a pulmonary MD and would like to see but needs assistance with co-pays etc. With the help of CMOC she reapplied for her MA on Monday 5/6/24.  I gave her the contact information for [facility].   Nuris stated she is using OTC Primatene mist for her wheezing.  She denies sputum, fever and chills. Nuris is currently smoking a half a pack of cigarettes per day. She agreed to [facility] smoking cessation.     Nuris does not own a nebulizer unit.  We discussed the different medications and [email or web address] has Albuterol and Advair but still a bit unaffordable for her.  She is looking to bridge until MA is re-instated.     OP RT CM will outreach PCP to see if there are any samples available to bridge patient. If she can get co=pays covered she can see pulmonary MD. Nuris wants to wait till she has co-pays waived and/or has coverage.     OP RT CM will outreach early next week and she has my contact information.   ________________________________  OP RT CM received incoming in basket message from PCP office staff.  They do not carry any inhaler samples.

## 2024-05-09 ENCOUNTER — PATIENT OUTREACH (OUTPATIENT)
Dept: OTHER | Facility: CLINIC | Age: 54
End: 2024-05-09

## 2024-05-14 ENCOUNTER — PATIENT OUTREACH (OUTPATIENT)
Dept: CASE MANAGEMENT | Facility: OTHER | Age: 54
End: 2024-05-14

## 2024-05-14 NOTE — PROGRESS NOTES
.OP RT CM called and left a VM requesting a return phone call. I will outreach in two weeks unless I hear back from patient prior. IN basket message received.   __________________________________  OP RT CM had missed incoming call from Nuris. She left a VM and request to return call.  _________________  OP RT CM returned call and get VM , message left.  I will outreach in two weeks unless I hear back from patient prior.

## 2024-05-15 ENCOUNTER — PATIENT OUTREACH (OUTPATIENT)
Dept: CASE MANAGEMENT | Facility: OTHER | Age: 54
End: 2024-05-15

## 2024-05-15 NOTE — PROGRESS NOTES
Nuris left a message for LOVE LOGAN RT who is Out of Office this PM. Message was relayed to this RT who attempted to connect.     A detailed message was left on her VM including contact information for a return call.

## 2024-05-16 ENCOUNTER — PATIENT OUTREACH (OUTPATIENT)
Dept: CASE MANAGEMENT | Facility: OTHER | Age: 54
End: 2024-05-16

## 2024-05-16 ENCOUNTER — PATIENT OUTREACH (OUTPATIENT)
Dept: FAMILY MEDICINE CLINIC | Facility: CLINIC | Age: 54
End: 2024-05-16

## 2024-05-16 NOTE — PROGRESS NOTES
OP RT CM had missed call from Nuris yesterday. .OP RT CM called and spoke with patient regarding her breathing, medications and smoking.    Nuris was approved for MA and her new health insurance will begin June 1, 2024.   She has a Breo and Albuterol inhaler on file at her pharmacy. To my knowledge, Breo is not covered however Albuterol should be. I encouraged her to  her Albuterol and inquire regarding costs for the Breo.    I offered to make pulmonary appt. For her but she declined and I gave her pulmonary phone number to schedule a consult. Nuris hares the car with her  so scheduling needs to be appropriate.   Nuris sounds well on the phone.    She returned [employer] Phone call and she stated she left a VM yesterday, regarding smoking cessation.     OP RT CM will outreach next week and Nuris has my contact information.

## 2024-05-16 NOTE — PROGRESS NOTES
Care management   contacts pt telephonically about documents that needed to be retreive for their medical assistance application.     CMOC left a voicemail to pt to verified if documents were gather, so that application can be completed.     CMOC will outreach pt again in a couple days to follow up on pt progress if pt does not call back.

## 2024-05-20 ENCOUNTER — PATIENT OUTREACH (OUTPATIENT)
Dept: FAMILY MEDICINE CLINIC | Facility: CLINIC | Age: 54
End: 2024-05-20

## 2024-05-20 NOTE — PROGRESS NOTES
Care management  contact pt via phone call.     CMOC check on the overview status of pt required documents for their compass application to be completed and submitted.     CMOC review with pt what documents are require from the department of human services in order to proceed with their application.    CMOC shared the delicacy of documents needed to be returned before June 6, 2024 as per processing case requirement.    CMOC shared with pt fax and email info so that documents can be submitted through them or their medical provider for efficient processing.     CMOC will touch base with the pt in a week to evaluate processing status.

## 2024-05-21 ENCOUNTER — PATIENT OUTREACH (OUTPATIENT)
Dept: FAMILY MEDICINE CLINIC | Facility: CLINIC | Age: 54
End: 2024-05-21

## 2024-05-21 NOTE — PROGRESS NOTES
OP CM kathie inbasket from CMOC that she is assisting pt with medicaid/ snap application.  CMOC made pt aware of all documents required.  OP CM to remain available.

## 2024-05-22 ENCOUNTER — PATIENT OUTREACH (OUTPATIENT)
Dept: FAMILY MEDICINE CLINIC | Facility: CLINIC | Age: 54
End: 2024-05-22

## 2024-05-22 NOTE — PROGRESS NOTES
Care management  received a missed call message from pt on 5/21, CMOC was out of office.    CMOC returned call on 5/22 and pt had questions about the processing of her documentation for their unpaid medical bills.    OC had an overview of the pt required documentation's and provided the pt with the fax number per pt request to sent documents to [facility] to finalized their application for compass.    [facility] was ask by pt if I can assist them with filling out a application for SNAP benefits, they had a change of mind with that need. Pt wanted to gather some  banking resources info for the SNAP application and will contact [facility] when info is available.     [facility] will conduct a follow up with pt in 3 days to overview SNAP application process.

## 2024-05-23 ENCOUNTER — PATIENT OUTREACH (OUTPATIENT)
Dept: FAMILY MEDICINE CLINIC | Facility: CLINIC | Age: 54
End: 2024-05-23

## 2024-05-23 NOTE — PROGRESS NOTES
OP CM rcvd inbasket from CMOC that pt is now requesting assistance with SNAP and will reach back out to CMOC.  OP CM to remain available for any psychosocial needs.

## 2024-05-24 ENCOUNTER — PATIENT OUTREACH (OUTPATIENT)
Dept: CASE MANAGEMENT | Facility: OTHER | Age: 54
End: 2024-05-24

## 2024-05-24 ENCOUNTER — PATIENT OUTREACH (OUTPATIENT)
Dept: FAMILY MEDICINE CLINIC | Facility: CLINIC | Age: 54
End: 2024-05-24

## 2024-05-24 NOTE — PROGRESS NOTES
Care management  contacted pt via telephone.    CMOC reviewed pt medical assistance application status with pt. Status is approved. Per pt they have received their medical assistance insurance card.    CMOC also completed a online compass application with pt telephonically for SNAP benefits.    CMOC will touch base with pt in 2 weeks for status of SNAP application.

## 2024-05-28 ENCOUNTER — PATIENT OUTREACH (OUTPATIENT)
Dept: FAMILY MEDICINE CLINIC | Facility: CLINIC | Age: 54
End: 2024-05-28

## 2024-05-28 ENCOUNTER — TELEPHONE (OUTPATIENT)
Dept: PSYCHIATRY | Facility: CLINIC | Age: 54
End: 2024-05-28

## 2024-05-28 DIAGNOSIS — K21.9 GASTROESOPHAGEAL REFLUX DISEASE WITHOUT ESOPHAGITIS: ICD-10-CM

## 2024-05-28 NOTE — PROGRESS NOTES
OP CM rcvd incoming call from pt but pt states this was an accident.  Pt was now approved for medicaid and CMOC also completed SNAP application.  OP CM will remain available.

## 2024-05-28 NOTE — TELEPHONE ENCOUNTER
Left voicemail informing patient and/or parent/guardian of the Psych Encounter form needing to be signed as a requirement from the insurance company for billing purposes. Patient can access form via [facility] and sign electronically.     Please make patient aware this form must be signed for each visit as a requirement to continue future visits with provider.

## 2024-05-29 RX ORDER — OMEPRAZOLE 20 MG/1
20 CAPSULE, DELAYED RELEASE ORAL
Qty: 30 CAPSULE | Refills: 5 | Status: SHIPPED | OUTPATIENT
Start: 2024-05-29 | End: 2024-11-25

## 2024-05-30 NOTE — TELEPHONE ENCOUNTER
Patient called requesting refill for Omeprazole. Patient made aware medication was refilled on 5/29/24 for 30 with 5 refills to [facility] pharmacy. Patient instructed to contact the pharmacy to obtain refills of medication. Patient verbalized understanding.

## 2024-06-03 ENCOUNTER — TELEPHONE (OUTPATIENT)
Dept: NEUROLOGY | Facility: CLINIC | Age: 54
End: 2024-06-03

## 2024-06-03 DIAGNOSIS — F33.2 SEVERE EPISODE OF RECURRENT MAJOR DEPRESSIVE DISORDER, WITHOUT PSYCHOTIC FEATURES (HCC): ICD-10-CM

## 2024-06-03 DIAGNOSIS — F41.1 GENERALIZED ANXIETY DISORDER: ICD-10-CM

## 2024-06-03 RX ORDER — BUSPIRONE HYDROCHLORIDE 10 MG/1
10 TABLET ORAL 3 TIMES DAILY
Qty: 270 TABLET | Refills: 0 | Status: SHIPPED | OUTPATIENT
Start: 2024-06-03 | End: 2024-06-05

## 2024-06-03 RX ORDER — VENLAFAXINE HYDROCHLORIDE 75 MG/1
75 CAPSULE, EXTENDED RELEASE ORAL DAILY
Qty: 90 CAPSULE | Refills: 0 | Status: SHIPPED | OUTPATIENT
Start: 2024-06-03 | End: 2024-09-01

## 2024-06-03 RX ORDER — VENLAFAXINE HYDROCHLORIDE 150 MG/1
150 CAPSULE, EXTENDED RELEASE ORAL DAILY
Qty: 90 CAPSULE | Refills: 0 | Status: SHIPPED | OUTPATIENT
Start: 2024-06-03 | End: 2024-06-04

## 2024-06-03 NOTE — TELEPHONE ENCOUNTER
Dr. Ribera Pt    Medication Refill Request     Name of Medication Buspar  Dose/Frequency 10mg Take 1 tablet by mouth 3 times a day  Quantity 90 Tablets  Verified pharmacy   [x]  Verified ordering Provider   [x]  Does patient have enough for the next 3 days? Yes [] No [x]  Does patient have a follow-up appointment scheduled? Yes [x] No []   If so when is appointment: 7/3/24    Medication Refill Request     Name of Medication Effexor XR  Dose/Frequency 150mg Take 1 capsule by mouth daily  Quantity 30 Capsules  Verified pharmacy   [x]  Verified ordering Provider   [x]  Does patient have enough for the next 3 days? Yes [] No [x]  Does patient have a follow-up appointment scheduled? Yes [x] No []   If so when is appointment: 7/3/24    Medication Refill Request     Name of Medication Effexor  Dose/Frequency 75mg Take 1 capsule by mouth daily  Quantity 30 Capsules  Verified pharmacy   [x]  Verified ordering Provider   [x]  Does patient have enough for the next 3 days? Yes [] No [x]  Does patient have a follow-up appointment scheduled? Yes [x] No []   If so when is appointment: 7/3/24

## 2024-06-03 NOTE — TELEPHONE ENCOUNTER
"Tadeo'd  6/3/4 9:08 AM:    \"My name is Nuris sainz. My date of birth is [birth year]. In November, Dr. Youngblood,  filled out a forms for me, like a head injury and a suspended my drivers license. So it was a medical form that she needed to fill out so I could send it to [facility] to get my license restored. And when I went to do that, one of the questions was not answered. So I would need her to either. I don't have a like a clean form but I was just wondering I saw her in [place], [facility] short. I just wondered, i know she has an office, a comes to [place]. So I wondered if I could bring this form when she is in the [place] office and have her like complete it So if you can please give me a call back.     # [phone]  ---------------------------------------------------  Patient need [facility] forms filled out again due to a missing question that was not answered. Pt asking if she can drop form off in [place] office for Dr. Youngblood to fill out. Please advise.     "

## 2024-06-03 NOTE — TELEPHONE ENCOUNTER
Chart reviewed. Refill was sent to the patient's preferred pharmacy, covering patient's primary psychiatrist, Dr. Ribera.

## 2024-06-03 NOTE — TELEPHONE ENCOUNTER
Pt called back in stating [facility] and was told one of the answers was not filled out on the cognitive impairment form. This needs to be fill out in order for pt to get her license back. Pt would like to pick the forms up in person. She would like to  at [address] if possible.     Please call pt at: [phone]

## 2024-06-04 ENCOUNTER — PATIENT OUTREACH (OUTPATIENT)
Dept: FAMILY MEDICINE CLINIC | Facility: CLINIC | Age: 54
End: 2024-06-04

## 2024-06-04 ENCOUNTER — TELEPHONE (OUTPATIENT)
Dept: NEUROLOGY | Facility: CLINIC | Age: 54
End: 2024-06-04

## 2024-06-04 DIAGNOSIS — F41.1 GENERALIZED ANXIETY DISORDER: ICD-10-CM

## 2024-06-04 DIAGNOSIS — F33.2 SEVERE EPISODE OF RECURRENT MAJOR DEPRESSIVE DISORDER, WITHOUT PSYCHOTIC FEATURES (HCC): ICD-10-CM

## 2024-06-04 RX ORDER — VENLAFAXINE HYDROCHLORIDE 150 MG/1
150 CAPSULE, EXTENDED RELEASE ORAL DAILY
Qty: 30 CAPSULE | Refills: 0 | Status: SHIPPED | OUTPATIENT
Start: 2024-06-04 | End: 2024-09-02

## 2024-06-04 NOTE — TELEPHONE ENCOUNTER
I talked to the patient and I refilled out the form and sent it to her in [facility] and told her she can pick it upin the [facility]

## 2024-06-04 NOTE — TELEPHONE ENCOUNTER
Lmom for patient to call me back so I know if she is going to drop off the cognitive form and said in the message that Dr. Youngblood does not go to the [place], but I did let her know that she could drop off the forms in the [place] location and they would scan the form into her chart and I would print them out and have them fill out, also to find out if she is going to pick them up or if she wants me to fax them

## 2024-06-04 NOTE — PROGRESS NOTES
Care management  via phone the pt.    CMOC receive a voicemail message on 6/3 from pt about them receiving mail from their SNAP application, asking for pt's spouse proof of income. Pt wanted to verified what documentation to send out. CMOC via phone the pt back on 6/4. CMOC recommended per SNAP application requirements for pt to submit spouse's recent pay subs to meet this part of the application requirement.   Per pt's request, I will be touching base with them in 2 weeks to ensure that all things are submitted and process for pt SNAP benefits.

## 2024-06-04 NOTE — TELEPHONE ENCOUNTER
Patient could not get through to me and she called the call center and they tried to transfer the call to me but it would not go through so I called the patient and she ask if we could fill out another form because the one she had taken to [employer] was missing a box that needed to be checked

## 2024-06-05 ENCOUNTER — OFFICE VISIT (OUTPATIENT)
Dept: PULMONOLOGY | Facility: CLINIC | Age: 54
End: 2024-06-05
Payer: COMMERCIAL

## 2024-06-05 VITALS
DIASTOLIC BLOOD PRESSURE: 90 MMHG | SYSTOLIC BLOOD PRESSURE: 136 MMHG | HEART RATE: 116 BPM | WEIGHT: 139 LBS | HEIGHT: 66 IN | OXYGEN SATURATION: 96 % | TEMPERATURE: 98.5 F | BODY MASS INDEX: 22.34 KG/M2

## 2024-06-05 DIAGNOSIS — F41.1 GENERALIZED ANXIETY DISORDER: ICD-10-CM

## 2024-06-05 DIAGNOSIS — R91.1 PULMONARY NODULE 1 CM OR GREATER IN DIAMETER: ICD-10-CM

## 2024-06-05 DIAGNOSIS — Z72.0 TOBACCO ABUSE: ICD-10-CM

## 2024-06-05 DIAGNOSIS — Z71.6 ENCOUNTER FOR TOBACCO USE CESSATION COUNSELING: Primary | ICD-10-CM

## 2024-06-05 DIAGNOSIS — J43.9 PULMONARY EMPHYSEMA, UNSPECIFIED EMPHYSEMA TYPE (HCC): ICD-10-CM

## 2024-06-05 PROCEDURE — 99214 OFFICE O/P EST MOD 30 MIN: CPT | Performed by: NURSE PRACTITIONER

## 2024-06-05 PROCEDURE — 99407 BEHAV CHNG SMOKING > 10 MIN: CPT | Performed by: NURSE PRACTITIONER

## 2024-06-05 RX ORDER — NICOTINE 21 MG/24HR
1 PATCH, TRANSDERMAL 24 HOURS TRANSDERMAL EVERY 24 HOURS
Qty: 28 PATCH | Refills: 3 | Status: SHIPPED | OUTPATIENT
Start: 2024-06-05

## 2024-06-05 RX ORDER — BUSPIRONE HYDROCHLORIDE 10 MG/1
10 TABLET ORAL 3 TIMES DAILY
Qty: 90 TABLET | Refills: 0 | Status: SHIPPED | OUTPATIENT
Start: 2024-06-05 | End: 2024-09-03

## 2024-06-05 NOTE — ASSESSMENT & PLAN NOTE
Positive smoking history of approximately 30 pack years, and patient appears to be in the preparation stage - she is acknowledging the problem and ready to take action.  We reviewed the effect of ongoing tobacco abuse on every system of the body and the increased health risks not limited to COPD, lung cancer, ischemic heart disease and stroke.     We did discuss pharmacology interventions including nicotine replacement (inhaler, spray, patch or lozenge/gum), Chantix (varenicline), Wellbutrin (buproprion). Also reviewed voluntary cut down methods like putting cigarettes out early and relighting them later, limiting the number of cigarettes available each day through pysically rationing, stop purchasing new cigarettes or supplies, and removing all smoking paraphernalia including telly trays, lighters and matches from the home at the final quit date to encourage success.    Social intervention opportunities identified include [employer], smart phone applications like Smoke Free and QuitNOW! These applications have been highly rated by other users who have successfully quit smoking and offer social support from other people in real time.    Physical interventions were also identified including replacing smoking behaviors with healthy behaviors: instead of smoking, take a walk, drink water, pet the dog, juan manuel, shuffle cards, or choose a hobby of your own.    We spent a total of 25 minutes discussing smoking cessation options. The following options were selected:  Pharmacology options: Nicotine patch 21mg. After discussion I do not feel she would do well with Wellbutrin - already on Effexor for depression/anxiety issues, and prior failed to benefit with Chantix.  Social options: We discussed taking walks, pursuing nonsmoking activities with friends, making phone calls to avert loneliness/boredom, pursuing gardening in a way that does not hurt her back (pots, raised beds, etc)  Physical interventions: Smoke outside at all  times. Limit access to cigarettes - start rationing with 15 cig per day, suggest cutting down by 1 cigarette every 2 weeks.  Patient was encouraged to select a quit smoking date however will defer to a future visit  Patient was given the Quit Smoking Guide - a tool kit from the [employer] - to help them navigate their quit smoking journey and identify pitfalls before they develop!    We agreed that a short follow up is advisable to provide support during the smoking cessation process and will meet again in 8 weeks to check in.

## 2024-06-05 NOTE — PROGRESS NOTES
Pulmonary Follow-Up Note   Nuris Infante 53 y.o. female MRN: [id number]  6/5/2024      Assessment/Plan:    Problem List Items Addressed This Visit       COPD (chronic obstructive pulmonary disease) (HCC)     This is a clinical diagnosis; no PFT on record however prior imaging studies do demonstrate emphysema. Patient was counselled extensively to quit smoking. Please schedule PFT that was ordered by PCP.    Albuterol HFA can be used 2 puffs up to 4x per day if needed  Will await PFT results to consider chronic inhaler like Stiolto or other.         Tobacco abuse     She has been a long-term smoker. I suspect her smoking history is closer to 30 pack years based on her start date of age 11 and report of 1/2-1 pack per day (and sometimes more). She would be eligible for lung cancer screening CT however currently being followed for lung nodule. Will discuss at next visit.         Relevant Medications    nicotine (NICODERM CQ) 21 mg/24 hr TD 24 hr patch    Pulmonary nodule 1 cm or greater in diameter     Due for repeat CT which was ordered prior by PCP         Encounter for tobacco use cessation counseling - Primary     Positive smoking history of approximately 30 pack years, and patient appears to be in the preparation stage - she is acknowledging the problem and ready to take action.  We reviewed the effect of ongoing tobacco abuse on every system of the body and the increased health risks not limited to COPD, lung cancer, ischemic heart disease and stroke.     We did discuss pharmacology interventions including nicotine replacement (inhaler, spray, patch or lozenge/gum), Chantix (varenicline), Wellbutrin (buproprion). Also reviewed voluntary cut down methods like putting cigarettes out early and relighting them later, limiting the number of cigarettes available each day through pysically rationing, stop purchasing new cigarettes or supplies, and removing all smoking paraphernalia including telly trays, lighters and  matches from the home at the final quit date to encourage success.    Social intervention opportunities identified include MoneyManNOW, smart phone applications like Smoke Free and QuitNOW! These applications have been highly rated by other users who have successfully quit smoking and offer social support from other people in real time.    Physical interventions were also identified including replacing smoking behaviors with healthy behaviors: instead of smoking, take a walk, drink water, pet the dog, juan manuel, shuffle cards, or choose a hobby of your own.    We spent a total of 25 minutes discussing smoking cessation options. The following options were selected:  Pharmacology options: Nicotine patch 21mg. After discussion I do not feel she would do well with Wellbutrin - already on Effexor for depression/anxiety issues, and prior failed to benefit with Chantix.  Social options: We discussed taking walks, pursuing nonsmoking activities with friends, making phone calls to avert loneliness/boredom, pursuing gardening in a way that does not hurt her back (pots, raised beds, etc)  Physical interventions: Smoke outside at all times. Limit access to cigarettes - start rationing with 15 cig per day, suggest cutting down by 1 cigarette every 2 weeks.  Patient was encouraged to select a quit smoking date however will defer to a future visit  Patient was given the Quit Smoking Guide - a tool kit from the [employer] - to help them navigate their quit smoking journey and identify pitfalls before they develop!    We agreed that a short follow up is advisable to provide support during the smoking cessation process and will meet again in 8 weeks to check in.             Relevant Medications    nicotine (NICODERM CQ) 21 mg/24 hr TD 24 hr patch     Vaccines: up to date    Return in about 8 weeks (around 7/31/2024).    All of Nuris's questions were answered prior to leaving the office today.  She is aware  to call our office with any further questions or concerns.    History of Present Illness   Reason for Visit: tobacco cessation  Chief Complaint: COPD  HPI: Nuris Infante is a 53 y.o. female who presents to the office today to discuss tobacco cessation. She has PMHx HTN, HLD, COPD, anxiety, GERD, insomnia.    COPD diagnosed by PCP recently and she did see Pulmonary at [facility] in the past. She was told she had chronic bronchitis at that time. She gets periodic bronchitis typically in the fall.  Not taking Breo - never had.  Albuterol HFA - Picking up from pharmacy    Smoking history: Started age 11, smoking 1/2-1PPD depending on how the day goes.  Attempts to quit: Has tried Chantix - did not work well due to prior history of depression and got very angry. Has tried Nicotine patch - no clear benefit    Hobbies: Liked gardening in the past - however has a bad back and cannot garden currently. Loves to cook, listen to music / concerts, going to the beach, reading.  Rewards: Unsure  Triggers: Inclined to smoke more when stressed, busy, boredom/loneliness when her  travels    Patient lives with  and 3 cats   Highest level of education is 2 years college  .  Currently unemployed due to back issues.    Drug use: hx marijuana - not currently  Alcohol use: History of heavy alcohol use; now 1 drink per week    Advance Directives Status: none     Functional status: Independent        Review of Systems  Please note that a 14-point review of systems was performed to include Constitutional, HEENT, Respiratory, CVS, GI, , Musculoskeletal, Integumentary, Neurologic, Rheumatologic, Endocrinologic, Psychiatric, Lymphatic, and Hematologic/Oncologic systems were reviewed and are negative unless otherwise stated in HPI. Positive and negative findings pertinent to this evaluation are incorporated into the history of present illness.       Historical Information   Past Medical History:   Diagnosis Date    ARF (acute renal  "failure) ([facility]) 2022    Aspiration pneumonitis ([facility]) 10/1/2022    Bacteremia due to methicillin susceptible Staphylococcus aureus (MSSA) 2022    Chronic pain disorder     Depression     Foreign body of right eye 2023    GERD (gastroesophageal reflux disease)     History of electroconvulsive therapy     Localized swelling of right upper extremity 2022    Low back pain     Self-injurious behavior     Suicide attempt ([facility])      Past Surgical History:   Procedure Laterality Date     SECTION      COLONOSCOPY      PANCREAS SURGERY      \"pseudocysts\" per patient's  Ricki Infante    TX ESOPHAGOGASTRODUODENOSCOPY TRANSORAL DIAGNOSTIC N/A 4/10/2018    Procedure: EGD AND COLONOSCOPY;  Surgeon: Gaurang De La Paz MD;  [place] [facility];  Service: Gastroenterology     Family History   Problem Relation Age of Onset    Arthritis Mother     Coronary artery disease Mother         MI in her 60's    Parkinsonism Father         with falls and SDH    Parkinsonism Paternal Grandmother     Coronary artery disease Paternal Grandfather     Parkinsonism Paternal Aunt     Colon cancer Family     Depression Neg Hx      Social History   Social History     Substance and Sexual Activity   Alcohol Use Not Currently    Comment: \"occasional glass of wine\"     Social History     Substance and Sexual Activity   Drug Use Not Currently    Types: Marijuana, Cocaine    Comment: medical, MARIJUANA     Social History     Tobacco Use   Smoking Status Some Days    Current packs/day: 0.50    Average packs/day: 0.5 packs/day for 35.0 years (17.5 ttl pk-yrs)    Types: Cigarettes   Smokeless Tobacco Never     E-Cigarette/Vaping    E-Cigarette Use Never User      E-Cigarette/Vaping Substances    Nicotine No     THC No     CBD No     Flavoring No     Other No     Unknown No        Meds/Allergies     Current Outpatient Medications:     albuterol (PROVENTIL HFA,VENTOLIN HFA) 90 mcg/act inhaler, Inhale 2 puffs every 6 (six) hours as " "needed for wheezing, Disp: 18 g, Rfl: 1    busPIRone (BUSPAR) 10 mg tablet, Take 1 tablet (10 mg total) by mouth 3 (three) times a day, Disp: 270 tablet, Rfl: 0    gabapentin (NEURONTIN) 400 mg capsule, TAKE 2 CAPSULES BY MOUTH ONCE DAILY IN THE MORNING AND 2 IN THE EVENING, Disp: 120 capsule, Rfl: 5    mirtazapine (REMERON) 15 mg tablet, Take 1 tablet (15 mg total) by mouth daily at bedtime, Disp: 30 tablet, Rfl: 2    naloxone (NARCAN) 4 mg/0.1 mL nasal spray, , Disp: , Rfl:     nicotine (NICODERM CQ) 21 mg/24 hr TD 24 hr patch, Place 1 patch on the skin over 24 hours every 24 hours, Disp: 28 patch, Rfl: 3    omeprazole (PriLOSEC) 20 mg delayed release capsule, Take 1 capsule (20 mg total) by mouth daily before breakfast, Disp: 30 capsule, Rfl: 5    venlafaxine (EFFEXOR-XR) 150 mg 24 hr capsule, Take 1 capsule (150 mg total) by mouth daily. Take in combination with 75mg Effexor for total daily dose of (225mg), Disp: 30 capsule, Rfl: 0    venlafaxine (EFFEXOR-XR) 75 mg 24 hr capsule, Take 1 capsule (75 mg total) by mouth daily Take in combination with 150mg Effexor for total daily dose of (225mg), Disp: 90 capsule, Rfl: 0    Thiamine HCl (vitamin B-1) 100 MG TABS, Take 1 tablet (100 mg total) by mouth daily (Patient not taking: Reported on 6/5/2024), Disp: 30 tablet, Rfl: 0  Allergies   Allergen Reactions    Chantix [Varenicline]     Ibuprofen Other (See Comments)     Upset stomach    Lyrica [Pregabalin] Other (See Comments)     bruising    Penicillins Other (See Comments)     ? hives    Sulfa Antibiotics Other (See Comments)     sloughing skin in mouth    Sulfasalazine        Vitals: Blood pressure 136/90, pulse (!) 116, temperature 98.5 °F (36.9 °C), temperature source Tympanic, height 5' 6\" (1.676 m), weight 63 kg (139 lb), last menstrual period 03/14/2019, SpO2 96%. Body mass index is 22.44 kg/m². Oxygen Therapy  SpO2: 96 %  Oxygen Therapy: None (Room air)      Physical Exam  Vitals reviewed.   Constitutional:  " "     Appearance: Normal appearance.   HENT:      Head: Normocephalic.      Nose: Nose normal.      Mouth/Throat:      Mouth: Mucous membranes are moist.      Pharynx: Oropharynx is clear.   Cardiovascular:      Rate and Rhythm: Normal rate and regular rhythm.      Pulses: Normal pulses.      Heart sounds: Normal heart sounds.   Pulmonary:      Effort: Pulmonary effort is normal.      Breath sounds: Normal breath sounds.   Musculoskeletal:         General: Normal range of motion.   Skin:     General: Skin is warm and dry.      Capillary Refill: Capillary refill takes less than 2 seconds.   Neurological:      General: No focal deficit present.      Mental Status: She is alert and oriented to person, place, and time. Mental status is at baseline.   Psychiatric:         Mood and Affect: Mood normal.         Behavior: Behavior normal.         Thought Content: Thought content normal.         Judgment: Judgment normal.         Labs: No recent labs    Imaging and other studies: No recent imaging    Pulmonary Results (PFTs, PSG): orders outstanding    CARLITOS Pyle  [facility]        Portions of the record may have been created with voice recognition software.  Occasional wrong word or \"sound a like\" substitutions may have occurred due to the inherent limitations of voice recognition software.  Read the chart carefully and recognize, using context, where substitutions have occurred or contact the dictating provider.  "

## 2024-06-05 NOTE — ASSESSMENT & PLAN NOTE
She is eligible for lung cancer screening CT. Patient is asked to schedule CT.    The following Shared Decision-Making points were covered:  Benefits of screening were discussed, including the rates of reduction in death from lung cancer and other causes.  Harms of screening were reviewed, including false positive tests, radiation exposure levels, risks of invasive procedures, risks of complications of screening, and risk of overdiagnosis.  I counseled on the importance of adherence to annual lung cancer LDCT screening, impact of co-morbidities, and ability or willingness to undergo diagnosis and treatment.  I counseled on the importance of maintaining abstinence as a former smoker or was counseled on the importance of smoking cessation if a current smoker     Review of Eligibility Criteria: He meets all of the criteria for Lung Cancer Screening.   Age 53  Smoking history 20 pack years  Currently with no signs or symptoms of lung cancer     After discussion, the patient decided to elect lung cancer screening.

## 2024-06-05 NOTE — ASSESSMENT & PLAN NOTE
This is a clinical diagnosis; no PFT on record however prior imaging studies do demonstrate emphysema. Patient was counselled extensively to quit smoking. Please schedule PFT that was ordered by PCP.    Albuterol HFA can be used 2 puffs up to 4x per day if needed  Will await PFT results to consider chronic inhaler like Stiolto or other.

## 2024-06-05 NOTE — ASSESSMENT & PLAN NOTE
She has been a long-term smoker. I suspect her smoking history is closer to 30 pack years based on her start date of age 11 and report of 1/2-1 pack per day (and sometimes more). She would be eligible for lung cancer screening CT however currently being followed for lung nodule. Will discuss at next visit.

## 2024-06-07 ENCOUNTER — PATIENT OUTREACH (OUTPATIENT)
Dept: CASE MANAGEMENT | Facility: OTHER | Age: 54
End: 2024-06-07

## 2024-06-07 DIAGNOSIS — F33.2 SEVERE EPISODE OF RECURRENT MAJOR DEPRESSIVE DISORDER, WITHOUT PSYCHOTIC FEATURES (HCC): ICD-10-CM

## 2024-06-07 DIAGNOSIS — F41.1 GENERALIZED ANXIETY DISORDER: ICD-10-CM

## 2024-06-07 RX ORDER — MIRTAZAPINE 15 MG/1
15 TABLET, FILM COATED ORAL
Qty: 30 TABLET | Refills: 2 | Status: SHIPPED | OUTPATIENT
Start: 2024-06-07 | End: 2024-09-05

## 2024-06-07 NOTE — PROGRESS NOTES
An attempt was made to contact Nuris to discuss her tobacco cessation and schedule a PFT.    A detailed message was left on her VM with contact information for a return call.

## 2024-06-11 ENCOUNTER — PATIENT OUTREACH (OUTPATIENT)
Dept: FAMILY MEDICINE CLINIC | Facility: CLINIC | Age: 54
End: 2024-06-11

## 2024-06-11 NOTE — PROGRESS NOTES
OP CM called to pt and [facility]  Pt is still working with [place] on food stamps.  Pt does have a psychiatrists through [facility].  Pt states she does not have a therapist but did in the past named Julieta in the past through [facility].  Pt states she will call OP CM if she decides she wants to go back to therapy.      Pt is going to apply for SSD for her back.  Pt states that she has medical bills from [facility].  Pt given the number for financial counselors.      Pt denies any other needs at this time.

## 2024-06-12 ENCOUNTER — PATIENT OUTREACH (OUTPATIENT)
Dept: FAMILY MEDICINE CLINIC | Facility: CLINIC | Age: 54
End: 2024-06-12

## 2024-06-12 NOTE — PROGRESS NOTES
Care management  reviewed status of pt's SNAP application for their food stamps.    CMOC reviewed pt verification letter through the [employer] website of the department of human services. In order for the pt's application to complete its processing period, proof of identification is needed. CMOC located pt's state ID on their Mychart and attach document to an email for the Department of human services and sent it to [email or web address].     CMOC at this time did not contact pt as this was a transaction for their Food stamps benefit.

## 2024-06-17 ENCOUNTER — PATIENT OUTREACH (OUTPATIENT)
Dept: CASE MANAGEMENT | Facility: OTHER | Age: 54
End: 2024-06-17

## 2024-06-17 NOTE — PROGRESS NOTES
.OP RT CM called and left a VM requesting a return phone call. I will outreach in two weeks unless I hear back from patient prior. In basket message received.

## 2024-06-18 ENCOUNTER — PATIENT OUTREACH (OUTPATIENT)
Dept: CASE MANAGEMENT | Facility: OTHER | Age: 54
End: 2024-06-18

## 2024-06-18 ENCOUNTER — PATIENT OUTREACH (OUTPATIENT)
Dept: FAMILY MEDICINE CLINIC | Facility: CLINIC | Age: 54
End: 2024-06-18

## 2024-06-18 NOTE — PROGRESS NOTES
"OP RT OLVE had a return call from nuris this morning.  I was in a meeting and couldn't take call. I will return her call this afternoon.   ________________________________  .OP RT CM called and spoke with patient regarding their breathing, medications and smoking cessation.   Nuris feels well and is very appreciative of outreaches and assistance \" I always know you're there to help\".    We discussed smoking cessation and she is using 21mg NRT patches. We dicussed lozenges/gum and currently only using NRT patches.  She is smoking less and we discussed to continue progressing and I congratulated her on success thus far! We discussed behavior modification and keeping track of her triggers.  She is smoke free in the house now and only smokes outdoors so she notices she is smoking much less due to this change.   Nuris now has health insurance and is using Albuterol rescue inhaler prn.   We discussed Complete PFT testing that is needed and I gave her the contact information for central scheduling. She will call to schedule.   OP RT LOVE will outreach in two weeks and Nuris has my contact information.   "

## 2024-06-18 NOTE — PROGRESS NOTES
Care management  via phone pt.    [facility] viewed pt's status of their SNAP application benefits through [employer] website. Pt's application is still in pending review. CM re-submitted pt's ID through the [employer] web as it was still appearing to be a needed document. CMOC call pt to review their standing in the pending process of their SNAP benefit application with them. [facility] was forwarded to pt voicemail box and left a message with contact information, incase pt needs further elaboration on their SNAP application.    CMOC email pt the [facility] Assistance office information, closes to their home address at [email or web address]. [facility] email to pt disclose that all documentation were submitted in order for their SNAP application to be process.     [facility] has completed all care plan assigned goals per pt TREVON Sanches referral.

## 2024-06-27 ENCOUNTER — TELEPHONE (OUTPATIENT)
Dept: FAMILY MEDICINE CLINIC | Facility: CLINIC | Age: 54
End: 2024-06-27

## 2024-06-27 NOTE — TELEPHONE ENCOUNTER
Prior Authorization / Abuterol Sulfate  (BASE MCG/ACT) Aerosol / [employer]  SCANNED AND ROUTED

## 2024-06-28 ENCOUNTER — TELEPHONE (OUTPATIENT)
Dept: FAMILY MEDICINE CLINIC | Facility: CLINIC | Age: 54
End: 2024-06-28

## 2024-06-28 NOTE — TELEPHONE ENCOUNTER
CoverMyMeds / Prior Authorization / Albuterol Sulfate  (90 Base) MCG ACT Aerosol // ROUTED AND SCANNED

## 2024-06-30 DIAGNOSIS — F41.1 GENERALIZED ANXIETY DISORDER: ICD-10-CM

## 2024-06-30 DIAGNOSIS — F33.2 SEVERE EPISODE OF RECURRENT MAJOR DEPRESSIVE DISORDER, WITHOUT PSYCHOTIC FEATURES (HCC): ICD-10-CM

## 2024-07-01 ENCOUNTER — PATIENT OUTREACH (OUTPATIENT)
Dept: CASE MANAGEMENT | Facility: OTHER | Age: 54
End: 2024-07-01

## 2024-07-01 RX ORDER — BUSPIRONE HYDROCHLORIDE 10 MG/1
10 TABLET ORAL 3 TIMES DAILY
Qty: 90 TABLET | Refills: 0 | Status: SHIPPED | OUTPATIENT
Start: 2024-07-01 | End: 2024-07-05

## 2024-07-01 RX ORDER — VENLAFAXINE HYDROCHLORIDE 150 MG/1
150 CAPSULE, EXTENDED RELEASE ORAL DAILY
Qty: 30 CAPSULE | Refills: 0 | Status: SHIPPED | OUTPATIENT
Start: 2024-07-01 | End: 2024-07-05 | Stop reason: SDUPTHER

## 2024-07-05 ENCOUNTER — OFFICE VISIT (OUTPATIENT)
Dept: PSYCHIATRY | Facility: CLINIC | Age: 54
End: 2024-07-05

## 2024-07-05 ENCOUNTER — PATIENT OUTREACH (OUTPATIENT)
Dept: FAMILY MEDICINE CLINIC | Facility: CLINIC | Age: 54
End: 2024-07-05

## 2024-07-05 ENCOUNTER — PATIENT OUTREACH (OUTPATIENT)
Dept: CASE MANAGEMENT | Facility: OTHER | Age: 54
End: 2024-07-05

## 2024-07-05 DIAGNOSIS — F33.2 SEVERE EPISODE OF RECURRENT MAJOR DEPRESSIVE DISORDER, WITHOUT PSYCHOTIC FEATURES (HCC): ICD-10-CM

## 2024-07-05 DIAGNOSIS — G47.09 OTHER INSOMNIA: ICD-10-CM

## 2024-07-05 DIAGNOSIS — F41.1 GENERALIZED ANXIETY DISORDER: Primary | ICD-10-CM

## 2024-07-05 RX ORDER — VENLAFAXINE HYDROCHLORIDE 150 MG/1
150 CAPSULE, EXTENDED RELEASE ORAL DAILY
Qty: 90 CAPSULE | Refills: 0 | Status: SHIPPED | OUTPATIENT
Start: 2024-07-05 | End: 2024-10-03

## 2024-07-05 RX ORDER — BUSPIRONE HYDROCHLORIDE 10 MG/1
15 TABLET ORAL 3 TIMES DAILY
Qty: 405 TABLET | Refills: 0 | Status: SHIPPED | OUTPATIENT
Start: 2024-07-05 | End: 2024-10-03

## 2024-07-05 RX ORDER — VENLAFAXINE HYDROCHLORIDE 75 MG/1
75 CAPSULE, EXTENDED RELEASE ORAL DAILY
Qty: 90 CAPSULE | Refills: 0 | Status: SHIPPED | OUTPATIENT
Start: 2024-07-05 | End: 2024-10-03

## 2024-07-05 RX ORDER — TRAZODONE HYDROCHLORIDE 100 MG/1
100 TABLET ORAL
Qty: 90 TABLET | Refills: 0 | Status: SHIPPED | OUTPATIENT
Start: 2024-07-05 | End: 2024-10-03

## 2024-07-05 NOTE — PROGRESS NOTES
OP RT CM received incoming call from Nuris.  She wanted to let me know she was losing her MA on 7/10/24. She needs PFT testing and I encouraged her to call and schedule and try to have testing prior. I gave her central scheduling.     I will inquire with MD as well, regarding inhaler needs. She may qualify for financial assistance programs as well.    OP RT CM will outreach in two weeks and Nuris has my contact information.

## 2024-07-05 NOTE — BH TREATMENT PLAN
TREATMENT PLAN (Medication Management Only)        [facility]    Name and Date of Birth:  Nuris Infante 53 y.o. 1970  Date of Treatment Plan: July 5, 2024  Diagnosis/Diagnoses:    1. Generalized anxiety disorder    2. Severe episode of recurrent major depressive disorder, without psychotic features (HCC)    3. Other insomnia      Strengths/Personal Resources for Self-Care: supportive family, taking medications as prescribed.  Area/Areas of need (in own words): anxiety, depression, sleep problems  1. Long Term Goal: continue improvement in anxiety.  Target Date:6 months - 1/5/2025  Person/Persons responsible for completion of goal: Nuris  2.  Short Term Objective (s) - How will we reach this goal?:   A. Provider new recommended medication/dosage changes and/or continue medication(s): continue current medications as prescribed.  B. N/A.  C. N/A.  Target Date:6 months - 1/5/2025  Person/Persons Responsible for Completion of Goal: Nuris  Progress Towards Goals: continuing treatment  Treatment Modality: medication management every 3 months  Review due 180 days from date of this plan: 6 months - 1/5/2025  Expected length of service: maintenance  My Physician/PA/NP and I have developed this plan together and I agree to work on the goals and objectives. I understand the treatment goals that were developed for my treatment.

## 2024-07-05 NOTE — PSYCH
"MEDICATION MANAGEMENT NOTE        [facility]      Name and Date of Birth:  Nuris Infante 53 y.o. 1970 MRN: [id number]    Date of Visit: July 5, 2024    Reason for Visit: Follow-up visit regarding medication management     Chief Complaint: \"My anxiety is high and I'm not sleeping\"    SUBJECTIVE:    Nuris Infnate is a 53 y.o., female, with a past psychiatric history significant for major depressive disorder, generalized anxiety disorder, medically complicated by IBS, COPD, hypertension, degenerative disc disease,, who presents for follow-up appointment for medication management. Nuris states that since their previous outpatient psychiatric appointment, she is feeling more depressed and anxious.  She reports that over the past few months she has been having trouble with sleep.  Continues to have difficulty initiating sleep although once able to fall asleep she does maintain sleep throughout the night.  She reports that she feels as though the Remeron has not been helpful with sleep maintenance or initiation.  She reports that she also continues to struggle with anxiety.  Feeling tense, keyed up, restless and on edge throughout the day most days.  She reports a significant stressor for her is financial concerns.  She learned that she may be losing her medical assistance due to her  obtaining a new job.  She reports that the relationship with her  is also a significant stressor for her as she feels as though the relationship is not as close as they once were and they often bicker and argue.  She reports that she continues to struggle with worsening back pain which she feels is also contributing to her anxiety and sleep difficulties.  She denies adamantly any suicidal or homicidal ideations.  Denies any auditory or visual hallucinations.    Current Rating Scores:   Current PHQ-9   PHQ-2/9 Depression Screening             Psychiatric Review Of " Systems:    Appetite: no  Adverse eating: no  Weight changes: no  Insomnia/sleeplessness: decreased  Fatigue/anergy: decreased  Anhedonia/lack of interest: no  Attention/concentration: no  Psychomotor agitation/retardation: no  Somatic symptoms: no  Anxiety/panic attack: yes, worrying  Dalia/hypomania: no  Hopelessness/helplessness/worthlessness: no  Self-injurious behavior/high-risk behavior: no  Suicidal ideation: no  Homicidal ideation: no  Auditory hallucinations: no  Visual hallucinations: no  Other perceptual disturbances: no  Delusional thinking: no  Obsessive/compulsive symptoms: no    Review Of Systems:      Constitutional negative   ENT negative   Cardiovascular negative   Respiratory negative   Gastrointestinal negative   Genitourinary negative   Musculoskeletal negative   Integumentary negative   Neurological negative   Endocrine negative   Other Symptoms none, all other systems are negative     History Review:   The following portions of the patient's history were reviewed and updated as appropriate: allergies, current medications, past family history, past medical history, past social history, past surgical history, and problem list. Previous progress notes/assessments from other providers reviewed as available.    Past Medical History:    Past Medical History:   Diagnosis Date    ARF (acute renal failure) ([facility]) 9/19/2022    Aspiration pneumonitis ([facility]) 10/1/2022    Bacteremia due to methicillin susceptible Staphylococcus aureus (MSSA) 9/27/2022    Chronic pain disorder     Depression     Foreign body of right eye 9/27/2023    GERD (gastroesophageal reflux disease)     History of electroconvulsive therapy     Localized swelling of right upper extremity 9/26/2022    Low back pain     Self-injurious behavior     Suicide attempt ([facility])         Allergies   Allergen Reactions    Chantix [Varenicline]     Ibuprofen Other (See Comments)     Upset stomach    Lyrica [Pregabalin] Other (See Comments)     bruising  "   Penicillins Other (See Comments)     ? hives    Sulfa Antibiotics Other (See Comments)     sloughing skin in mouth    Sulfasalazine        Substance Abuse History:    Social History     Substance and Sexual Activity   Alcohol Use Not Currently    Comment: \"occasional glass of wine\"     Social History     Substance and Sexual Activity   Drug Use Not Currently    Types: Marijuana, Cocaine    Comment: medical, MARIJUANA       Social History:    Social History     Socioeconomic History    Marital status: /Civil Union     Spouse name: Not on file    Number of children: Not on file    Years of education: Not on file    Highest education level: Not on file   Occupational History    Occupation: disability   Tobacco Use    Smoking status: Some Days     Current packs/day: 0.50     Average packs/day: 0.5 packs/day for 35.0 years (17.5 ttl pk-yrs)     Types: Cigarettes    Smokeless tobacco: Never   Vaping Use    Vaping status: Never Used   Substance and Sexual Activity    Alcohol use: Not Currently     Comment: \"occasional glass of wine\"    Drug use: Not Currently     Types: Marijuana, Cocaine     Comment: medical, MARIJUANA    Sexual activity: Yes     Partners: Male     Birth control/protection: Post-menopausal     Comment: PT is    Other Topics Concern    Not on file   Social History Narrative    Lives with spouse     Social Determinants of Health     Financial Resource Strain: Not on file   Food Insecurity: No Food Insecurity (9/18/2023)    [facility]    Hunger Vital Sign     Worried About Running Out of Food in the Last Year: Never true     Ran Out of Food in the Last Year: Never true   Transportation Needs: No Transportation Needs (9/18/2023)    [facility]    PRAPARE - Transportation     Lack of Transportation (Medical): No     Lack " of Transportation (Non-Medical): No   Physical Activity: Not on file   Stress: Not on file   Social Connections: Not on file   Intimate Partner Violence: Not on file   Housing Stability: Low Risk  (9/18/2023)    [facility]    Housing Stability Vital Sign     Unable to Pay for Housing in the Last Year: No     Number of Places Lived in the Last Year: 1     In the last 12 months, was there a time when you did not have a steady place to sleep or slept in a shelter (including now)?: No       Family Psychiatric History:     Family History   Problem Relation Age of Onset    Arthritis Mother     Coronary artery disease Mother         MI in her 60's    Parkinsonism Father         with falls and SDH    Parkinsonism Paternal Grandmother     Coronary artery disease Paternal Grandfather     Parkinsonism Paternal Aunt     Colon cancer Family     Depression Neg Hx             OBJECTIVE:     Vital signs in last 24 hours:    There were no vitals filed for this visit.    Mental Status Evaluation:    Appearance age appropriate, casually dressed   Behavior cooperative, calm   Speech normal rate, normal volume, normal pitch   Mood euthymic   Affect constricted   Thought Processes organized, goal directed   Associations intact associations   Thought Content no overt delusions   Perceptual Disturbances: no auditory hallucinations, no visual hallucinations   Abnormal Thoughts  Risk Potential Suicidal ideation - None  Homicidal ideation - None  Potential for aggression - No   Orientation oriented to person, place, time/date, and situation   Memory recent and remote memory grossly intact   Consciousness alert and awake   Attention Span Concentration Span attention span and concentration are age appropriate   Intellect appears to be of average intelligence   Insight intact   Judgement intact   Muscle Strength and  Gait normal muscle strength and normal  muscle tone, normal gait and normal balance   Motor activity no abnormal movements   Fund of Knowledge adequate knowledge of current events  adequate fund of knowledge regarding past history  adequate fund of knowledge regarding vocabulary    Pain none   Pain Scale 0       Laboratory Results: I have personally reviewed all pertinent laboratory/tests results    Recent Labs (last 4 months):   No visits with results within 4 Month(s) from this visit.   Latest known visit with results is:   Admission on 06/07/2023, Discharged on 06/07/2023   Component Date Value    WBC 06/07/2023 10.07     RBC 06/07/2023 4.68     Hemoglobin 06/07/2023 14.4     Hematocrit 06/07/2023 45.1     MCV 06/07/2023 96     MCH 06/07/2023 30.8     MCHC 06/07/2023 31.9     RDW 06/07/2023 14.5     MPV 06/07/2023 9.4     Platelets 06/07/2023 250     nRBC 06/07/2023 0     Segmented % 06/07/2023 61     Immature Grans % 06/07/2023 0     Lymphocytes % 06/07/2023 31     Monocytes % 06/07/2023 6     Eosinophils Relative 06/07/2023 1     Basophils Relative 06/07/2023 1     Absolute Neutrophils 06/07/2023 6.22     Absolute Immature Grans 06/07/2023 0.02     Absolute Lymphocytes 06/07/2023 3.07     Absolute Monocytes 06/07/2023 0.62     Eosinophils Absolute 06/07/2023 0.06     Basophils Absolute 06/07/2023 0.08     Sodium 06/07/2023 134 (L)     Potassium 06/07/2023 4.0     Chloride 06/07/2023 95 (L)     CO2 06/07/2023 33 (H)     ANION GAP 06/07/2023 6     BUN 06/07/2023 11     Creatinine 06/07/2023 0.90     Glucose 06/07/2023 129     Calcium 06/07/2023 10.0     eGFR 06/07/2023 72     Lipase 06/07/2023 8 (L)        Suicide/Homicide Risk Assessment:    The following interventions are recommended: no intervention changes needed.     Although patient's acute lethality risk is low, long-term/chronic lethality risk is mildly elevated in the presence of current diagnoses.   At the current moment, Nuris is future-oriented, forward-thinking, and demonstrates ability  to act in a self-preserving manner as evidenced by volitionally presenting to the clinic today, seeking treatment. To mitigate future risk, patient should adhere to the recommendations below, avoid alcohol/illicit substance use, utilize community-based resources and familiar support and prioritize mental health treatment. Presently, patient denies active suicidal/homicidal ideation in addition to thoughts of self-injury; contracts for safety. Patient is amenable to calling/contacting the outpatient office including this writer if any acute adverse effects of their medication regimen arise in addition to any comments or concerns pertaining to their psychiatric management.  Patient is amenable to calling/contacting crisis and/or attending to the nearest emergency department if their clinical condition deteriorates to assure their safety and stability, stating that they are able to appropriately confide in their spouse regarding their psychiatric state.    Assessment/Plan:     Nuris Infante is a 52-year-old female who carries a past psychiatric history significant for generalized anxiety disorder and major depressive disorder.  She presents to establish care in his transfer of services from [facility].  She has a long history of depression and anxiety stemming back to childhood, fluctuating throughout her lifetime and has trialed various antidepressant medications.  She has been on Effexor for the longest period of time which seemingly has been helpful.  There is a history of abuse, in the past heavily abusing alcohol although has been sober from this for 16 years.  She has had some benzodiazepine abuse as well, more recently being admitted to the hospital for encephalopathy after an overdose on Xanax.  She is struggling with sleep with anxiety symptoms at this time and feels that depression is mostly stable.  Her anxiety seems to stem from numerous medical conditions , most recently neuropathy and chronic back  pain which seems to be limiting her physical mobility.    5/18/23 -worsening anxiety, mostly stemming from an upcoming EMG.  She requests benzos initially although we discussed potential for abuse.  She will try increased dose of Effexor to 225 mg and as needed Vistaril.  In the future consider adjusting Effexor to Cymbalta for neuropathic pain.  We will also consider a TCA if needed - perhaps nortriptyline.    7/5/23 -reporting increased anxiety symptoms.  Atarax was ineffective and stopped using this as a as needed.  Also struggling with poor sleep and chronic issues with pain related to peripheral nerves.  Followed with neurosurgery consultation of whom has recommendations to be evaluated by a physician at [facility].  Relationship issues are ongoing with her , she is considering separation.  She is open to therapy which she had not been previously.  We will place referral and increase Seroquel to target sleep symptoms. May consider a brief use of sleep aid if Seroquel optimization is not effective.    8/21/23 Still working with insomnia, no relief from the increase in Seroquel.  Also chronic pain related to recent nerve peroneal decompression of right foot.  She was amenable to starting trazodone 100 mg at bedtime as needed for insomnia. If this is not effective will consider use of Lunesta which she benefited from in the past.    11/24/23 She is having more anxiety - related to relationship issue with . She is still having trouble sleeping, averaging 2 hours per night.  We discussed various medication options to help with her sleep. Z Hypnotics are options though I am concerned about intermittent alcohol use. Seroquel and trazodone both increase risk for falls as well. We discussed reducing polypharmacy and trying Belsomra to help with sleep. Counseled about risk of sedation and risk of side effects and falls if used in combination with alcohol. She expressed understanding.    1/31/24 She is  feeling more anxious and depressed.  Reports that she is still not sleeping.  She was unable to get the Belsomra due to insurance issues.  She again is requesting Lunesta although given some concerns with alcohol in the past we will defer at this time.  She was open to retrying Remeron to target depression, anxiety, sleep.  We will discontinue trazodone.    3/20/24 She has been more depressed over past week - mostly due to financial concerns, 's unemployment, her inability to drive, as well as relationship stress with her daughter. She feels overwhelmed from efforts she is putting into assisting with others. Sleep is still poor with trouble maintaining sleep; Remeron was helpful initially but effect dwindled. In agreement to increase it today.      7/5/24 She is feeling more anxious and stressed. Having difficulty with sleep.  Feels that the Remeron was not helpful at all.  Stress remains from financial concerns, mostly insurance related that she may be losing her medical assistance in the upcoming weeks.  She also is having relationship difficulties with her .  She inquires today again about controlled substances, namely sleep aid medications.  We will avoid these given history of alcohol abuse.  Discussed trialing trazodone again to assist with sleep and she was open to this.    DSM-5 Diagnoses:     Major depressive disorder, recurrent, moderate  Generalized anxiety disorder  Alcohol use disorder in sustained remission      Treatment Recommendations/Precautions:    Effexor to 225 mg daily for depression and anxiety.  Discontinue Remeron.  Start trazodone 100 mg at bedtime as needed for insomnia.  Increase BuSpar to 15 mg 3 times daily for anxiety.  Referral for therapy - pending.  Aware of need to follow up with family physician for medical issues  Aware of 24 hour and weekend coverage for urgent situations accessed by calling [facility] main practice number    Medications  Risks/Benefits      Risks, Benefits And Possible Side Effects Of Medications:    Risks, benefits, and possible side effects of medications explained to Nuris including risk of suicidality and serotonin syndrome related to treatment with antidepressants. She verbalizes understanding and agreement for treatment..    Controlled Medication Discussion:     Not applicable    Psychotherapy Provided:     Individual psychotherapy provided: Yes  Counseling was provided during the session today for 16 minutes.  Recent stressor including family conflict, family issues, relationship problems, recent medication change, social difficulties, and everyday stressors discussed with Nuris.   Coping skills reviewed with Nuris.   Reassurance and supportive therapy provided.      Treatment Plan:    Completed and signed during the session: Yes - with Nuris    This note was not shared with the patient due to reasonable likelihood of causing patient harm    Visit Time    Visit Start Time: 230pm  Visit Stop Time: 258pm  Total Visit Duration:  28 minutes        Abdias Ribera MD 07/05/24

## 2024-07-05 NOTE — PROGRESS NOTES
Care management  received a voicemail from pt on 2024.     CMOC had a voice message from pt stating that they receive a letter from the [facility] office explaining to them that their health coverage will  on 7/10/2024. Pt wanted to understand further as to why their coverage was only available to them for 30 days.CMOC review pt's health coverage status on the [employer] web with pt's e-form number [id number]. CMOC contacted the [facility] office at [phone], to review pt disqualifications for continual health coverage. The [place] office determine that the pt spouse's income did not qualify them for continual coverage, since they are consider part of the household.   CMOC contacted pt and communicate to them the reason for their coverage not meeting the requirements for ongoing health coverage. Pt mention that spouse will soon be able to receive health insurance through their employer. CMOC encourage pt to have their spouse add them to their employer's health insurance to assist them with their health coverage needs.     CMOC at this time will not be following up with pt as this was a one time outreach after pt care plan task was completed, and referral was closed 2024.

## 2024-07-05 NOTE — PROGRESS NOTES
OP CM rcvd inbasket from CMOC that pts goals have been completed.  Pt denies further needs.  Case closed.

## 2024-07-09 ENCOUNTER — TELEPHONE (OUTPATIENT)
Dept: PSYCHIATRY | Facility: CLINIC | Age: 54
End: 2024-07-09

## 2024-07-09 NOTE — TELEPHONE ENCOUNTER
Nuris Infante requested a call back from office nurse to discuss her Buspar medication.    They can be reached at P# [phone].       Thank you.

## 2024-07-10 ENCOUNTER — PATIENT OUTREACH (OUTPATIENT)
Dept: CASE MANAGEMENT | Facility: OTHER | Age: 54
End: 2024-07-10

## 2024-07-10 NOTE — PROGRESS NOTES
OP RT CM had missed call from Nuris.  She left a detailed VM stating she scheduled her PFT for next week.     OP RT CM will outreach next week.  Nuris has my contact information.

## 2024-07-11 NOTE — TELEPHONE ENCOUNTER
Follow up call to Nuris.  She needed clarification on her script for Buspar.  States provider increased it during last appointment but her script still says 10 MG TID.  Informed her that it was increased to 1.5 tablets TID and more than likely when she picked up her script, they may have given her a refill from her previous script.  Nothing further needed at this time.    remains intact across BLEs. Balance remains slightly reduced at 25/28 Tinetti ( low risk of falls).    Body Structures, Functions, Activity Limitations Requiring Skilled Therapeutic Intervention: Decreased strength, Increased pain, Decreased posture    Statement of Medical Necessity: Physical Therapy is both indicated and medically necessary as outlined in the POC to increase the likelihood of meeting the functionally related goals stated below.     Patient's Activity Tolerance:        Patient's rehabilitation potential/prognosis is considered to be: Good    Factors which may impact rehabilitation potential include:          GOALS   Patient Goal(s):    Short Term Goals Completed by 3 weeks Goal Status   increase strength of trunk to grossly 4 to 4+/5 improving upright posturing to FAIR+     decrease pain to < 5/10 across back region with activity                           Long Term Goals Completed by 6 weeks Goal Status   increase strength of trunk to grossly 4+/5 improving upright posturing to GOOD-     decrease pain to < 3/10 across back region with activity     Patient demonstrates independence with HEP/symptom management                      TREATMENT PLAN       Requires PT Follow-Up: Yes    Pt. actively involved in establishing Plan of Care and Goals: Yes  Patient/ Caregiver education and instruction:               Treatment may include any combination of the following: Strengthening, Balance training, Gait training, Stair training, Neuromuscular re-education, Home exercise program, Safety education & training, Patient/Caregiver education & training, Modalities, Dry needling     Frequency / Duration:  Patient to be seen 2 for 5 weeks      Eval Complexity:    Decision Making: Low Complexity    PT Treatment Completed:  N/A - Evaluation Only    Therapy Time  Individual Time In: 1010       Individual Time Out: 1040  Minutes: 30        Therapist Signature: Cornel Ruiz, PT    Date: 5/29/2024     I certify that

## 2024-07-17 ENCOUNTER — PATIENT OUTREACH (OUTPATIENT)
Dept: CASE MANAGEMENT | Facility: OTHER | Age: 54
End: 2024-07-17

## 2024-07-17 NOTE — PROGRESS NOTES
..OP RT CM called and left a VM requesting a return phone call. I will outreach in two weeks unless I hear back from patient prior. In basket message received.

## 2024-07-18 ENCOUNTER — PATIENT OUTREACH (OUTPATIENT)
Dept: CASE MANAGEMENT | Facility: OTHER | Age: 54
End: 2024-07-18

## 2024-07-18 NOTE — PROGRESS NOTES
OP RT CM received incoming return from nuris.  She stated he breathing is good and she is using her Albuterol rescue TID. She is coughing up sputum in the morning white in color. She has an upcoming PFT scheduled 7/24/24 and another smoking cessation visit with [facility] on the 31st.     I will inquire with Leticia if she can  review results and manage with patient at smoking cessation visit or if a consult needs to be placed.  Nuris has been smoke free.   IN basket message sent. Will schedule a consult if needed.   OP RT CM will outreach patient next week and Nuris has my contact information.   ________________________________ OP RT CM received incoming in basket from Leticia ORNELAS Regarding initial pulm. Consult needed. OP RT CM called Nuris to let her know and gave her the contact information for the pulmonary group. She was appreciative.   OP RT CM will outreach in two weeks and Nuris has my contact information.

## 2024-07-25 DIAGNOSIS — J43.9 PULMONARY EMPHYSEMA, UNSPECIFIED EMPHYSEMA TYPE (HCC): ICD-10-CM

## 2024-07-25 RX ORDER — ALBUTEROL SULFATE 90 UG/1
2 AEROSOL, METERED RESPIRATORY (INHALATION) EVERY 6 HOURS PRN
Qty: 18 G | Refills: 5 | Status: SHIPPED | OUTPATIENT
Start: 2024-07-25

## 2024-07-29 DIAGNOSIS — F33.2 SEVERE EPISODE OF RECURRENT MAJOR DEPRESSIVE DISORDER, WITHOUT PSYCHOTIC FEATURES (HCC): ICD-10-CM

## 2024-07-29 DIAGNOSIS — F41.1 GENERALIZED ANXIETY DISORDER: ICD-10-CM

## 2024-07-29 RX ORDER — VENLAFAXINE HYDROCHLORIDE 150 MG/1
150 CAPSULE, EXTENDED RELEASE ORAL DAILY
Qty: 30 CAPSULE | Refills: 0 | Status: SHIPPED | OUTPATIENT
Start: 2024-07-29 | End: 2024-10-27

## 2024-08-01 ENCOUNTER — PATIENT OUTREACH (OUTPATIENT)
Dept: CASE MANAGEMENT | Facility: OTHER | Age: 54
End: 2024-08-01

## 2024-08-07 ENCOUNTER — TELEPHONE (OUTPATIENT)
Age: 54
End: 2024-08-07

## 2024-08-07 DIAGNOSIS — M48.062 SPINAL STENOSIS OF LUMBAR REGION WITH NEUROGENIC CLAUDICATION: Primary | ICD-10-CM

## 2024-08-07 NOTE — TELEPHONE ENCOUNTER
Reason for call:   [x] Refill   [] Prior Auth  [x] Other: these are not active on her medication list, pt said she got them last June    Office:   [x] PCP/Provider - Dr Leon  [] Specialty/Provider -     Medication: lidocaine patch    Dose/Frequency: 5 % apply one every 12 hours as needed    Quantity:     Pharmacy: [facility]    Does the patient have enough for 3 days?   [] Yes   [x] No - Send as HP to POD- pt is out of them

## 2024-08-08 ENCOUNTER — TELEPHONE (OUTPATIENT)
Age: 54
End: 2024-08-08

## 2024-08-08 NOTE — TELEPHONE ENCOUNTER
Patient called the RX Refill Line. Message is being forwarded to the office.     Patient stated she was given trazodone for insomnia and it is not helping. She couldn't remember what she used in the past but found it and said she used to take Klonopin and that has helped.     Please contact patient at [phone]

## 2024-08-08 NOTE — TELEPHONE ENCOUNTER
Voicemail left for Nuris requesting a call back to further discuss her concerns. Will forward to provider to keep posted.     Upon chart check, Nuris was started on trazadone 100 mg PRN for sleep on 7/5 and her remeron was discontinued.

## 2024-08-08 NOTE — TELEPHONE ENCOUNTER
Forwarding you on this just to keep you in the loop:     I called Nuris and she answered and said something I couldn't understand then hung up. Attempted to call back once more and she hung up right away. Will wait for her to contact the office again.

## 2024-08-08 NOTE — TELEPHONE ENCOUNTER
Patient is following up on this request. She does not have any left and is requesting someone reach out to her to update her on the request once refill request is addressed.

## 2024-08-08 NOTE — TELEPHONE ENCOUNTER
I have it documented that her lighted Derm patch was discontinued 1/31/2024.  She was using it for rib fracture.  She should not need it anymore

## 2024-08-08 NOTE — TELEPHONE ENCOUNTER
I have lighted Derm patch was discontinued 1/31/2024 it was used for her rib fracture. she should not need it at this time for rib fracture

## 2024-08-09 NOTE — TELEPHONE ENCOUNTER
Patient called the RX Refill Line. Message is being forwarded to the office.     Patient called back stating that she has not been using the lidocaine patches for her ribs anymore but has been using them for back pain she is having. She would like to know if they can be refilled. Patient would like to be contacted     Please contact patient at [phone]

## 2024-08-12 DIAGNOSIS — G47.09 OTHER INSOMNIA: ICD-10-CM

## 2024-08-12 RX ORDER — TRAZODONE HYDROCHLORIDE 100 MG/1
150 TABLET ORAL
Qty: 90 TABLET | Refills: 0 | Status: SHIPPED | OUTPATIENT
Start: 2024-08-12 | End: 2024-11-10

## 2024-08-12 RX ORDER — LIDOCAINE 50 MG/G
1 PATCH TOPICAL DAILY
Qty: 30 PATCH | Refills: 3 | Status: SHIPPED | OUTPATIENT
Start: 2024-08-12

## 2024-08-12 NOTE — TELEPHONE ENCOUNTER
LM on Nuris's VM informing her that refill for Trazodone was sent to her pharmacy.  Informed her that provider sent 100 MG tablets and she should take 1.5 tablets at bedtime as needed.  Requested she call the office if she has any further questions.

## 2024-08-12 NOTE — TELEPHONE ENCOUNTER
Patient called rx refill line stating she never heard back- I read messages to patient that the doctor can increase trazadone to 150mg    Patient stated she will need a new script to be sent to [facility]

## 2024-08-12 NOTE — TELEPHONE ENCOUNTER
Patient called Rx refill line requesting lidocaine patches for her back pain, she stated it is not for her ribs and would like a call back from the office regarding these. She stated she has been calling since last week and has not heard anything back and would like a phone call. Please contact patient.

## 2024-08-13 ENCOUNTER — TELEPHONE (OUTPATIENT)
Dept: FAMILY MEDICINE CLINIC | Facility: CLINIC | Age: 54
End: 2024-08-13

## 2024-08-14 ENCOUNTER — PATIENT OUTREACH (OUTPATIENT)
Dept: CASE MANAGEMENT | Facility: OTHER | Age: 54
End: 2024-08-14

## 2024-08-14 ENCOUNTER — TELEPHONE (OUTPATIENT)
Dept: FAMILY MEDICINE CLINIC | Facility: CLINIC | Age: 54
End: 2024-08-14

## 2024-08-14 NOTE — TELEPHONE ENCOUNTER
Tony from [id number] returned phone call back. I warm transferred him to [facility] in office clinical.

## 2024-08-14 NOTE — TELEPHONE ENCOUNTER
Per pharmacist at [facility], Rx just needs a prior auth. It has been initiated. Patient aware.

## 2024-08-14 NOTE — TELEPHONE ENCOUNTER
Patient called and said [facility] told her in order to get her Lidocaine patch, we need to give them the correct Prescriber ID number.  I told patient, according to her chart, I see she needs a prior auth and that it has been started.   I called [facility] to get more info but had to LMOM.

## 2024-08-14 NOTE — PROGRESS NOTES
.OP RT CM called patient and requested a return phone call.     Unable to reach letter sent via standard mail.     OP RT CM will discontinue outreach if there is no response from patient within two weeks.

## 2024-08-23 NOTE — TELEPHONE ENCOUNTER
Duplicate encounter created, please see telephone encounter from 8/14/2024 regarding Lidocaine patch PA status. Please review patient's chart to see if there is already an encounter regarding the medication in question and to document anything regarding this medication in regards to anything regarding the authorization process etc before creating another encounter Thank You.

## 2024-09-04 ENCOUNTER — TELEPHONE (OUTPATIENT)
Dept: FAMILY MEDICINE CLINIC | Facility: CLINIC | Age: 54
End: 2024-09-04

## 2024-09-04 NOTE — TELEPHONE ENCOUNTER
Patient did not show up for scheduled follow-up today, 9/4/2024.  Please call patient to reschedule in addition patient is overdue for chest CT.  Patient is a smoker and a lung nodule she had an order to have a chest CT scheduled 6/1/2024 which was not completed please have her complete this ASAP

## 2024-09-20 DIAGNOSIS — Z72.0 TOBACCO ABUSE: ICD-10-CM

## 2024-09-20 DIAGNOSIS — Z71.6 ENCOUNTER FOR TOBACCO USE CESSATION COUNSELING: ICD-10-CM

## 2024-09-20 RX ORDER — NICOTINE 21 MG/24HR
1 PATCH, TRANSDERMAL 24 HOURS TRANSDERMAL EVERY 24 HOURS
Qty: 28 PATCH | Refills: 1 | Status: SHIPPED | OUTPATIENT
Start: 2024-09-20

## 2024-09-23 PROBLEM — Z09 HOSPITAL DISCHARGE FOLLOW-UP: Status: RESOLVED | Noted: 2023-11-20 | Resolved: 2024-09-23

## 2024-09-23 PROBLEM — Z98.890 S/P COLONOSCOPY: Status: ACTIVE | Noted: 2024-09-23

## 2024-09-27 DIAGNOSIS — G47.09 OTHER INSOMNIA: ICD-10-CM

## 2024-09-27 NOTE — TELEPHONE ENCOUNTER
Patient need another refill... She lost her prescription and would like to pay out of pocket    Reason for call:   [x] Refill   [] Prior Auth  [] Other:     Office:   [] PCP/Provider -   [x] Specialty/Provider - [facility]     Medication: traZODone (DESYREL) 100 mg tablet     Dose/Frequency: : Take 1.5 tablets (150 mg total) by mouth daily at bedtime as needed for sleep (insomnia)     Quantity: 90    Pharmacy: [id number] - [place] - [address] [facility]      Does the patient have enough for 3 days?   [] Yes   [x] No - Send as HP to POD

## 2024-09-29 DIAGNOSIS — F41.1 GENERALIZED ANXIETY DISORDER: ICD-10-CM

## 2024-09-29 DIAGNOSIS — F33.2 SEVERE EPISODE OF RECURRENT MAJOR DEPRESSIVE DISORDER, WITHOUT PSYCHOTIC FEATURES (HCC): ICD-10-CM

## 2024-09-30 ENCOUNTER — OFFICE VISIT (OUTPATIENT)
Dept: FAMILY MEDICINE CLINIC | Facility: CLINIC | Age: 54
End: 2024-09-30
Payer: COMMERCIAL

## 2024-09-30 VITALS
DIASTOLIC BLOOD PRESSURE: 78 MMHG | HEIGHT: 66 IN | BODY MASS INDEX: 21.05 KG/M2 | WEIGHT: 131 LBS | HEART RATE: 94 BPM | OXYGEN SATURATION: 95 % | SYSTOLIC BLOOD PRESSURE: 124 MMHG

## 2024-09-30 DIAGNOSIS — G47.09 OTHER INSOMNIA: ICD-10-CM

## 2024-09-30 DIAGNOSIS — Z12.31 SCREENING MAMMOGRAM FOR BREAST CANCER: ICD-10-CM

## 2024-09-30 DIAGNOSIS — E78.2 MIXED HYPERLIPIDEMIA: ICD-10-CM

## 2024-09-30 DIAGNOSIS — F11.91 OPIOID USE, UNSPECIFIED, IN REMISSION: ICD-10-CM

## 2024-09-30 DIAGNOSIS — Z23 NEEDS FLU SHOT: ICD-10-CM

## 2024-09-30 DIAGNOSIS — R91.1 PULMONARY NODULE 1 CM OR GREATER IN DIAMETER: ICD-10-CM

## 2024-09-30 DIAGNOSIS — F33.42 RECURRENT MAJOR DEPRESSIVE DISORDER, IN FULL REMISSION (HCC): ICD-10-CM

## 2024-09-30 DIAGNOSIS — Z00.00 HEALTHCARE MAINTENANCE: Primary | ICD-10-CM

## 2024-09-30 DIAGNOSIS — R73.9 HYPERGLYCEMIA: ICD-10-CM

## 2024-09-30 DIAGNOSIS — Z13.31 POSITIVE DEPRESSION SCREENING: ICD-10-CM

## 2024-09-30 DIAGNOSIS — Z72.0 TOBACCO ABUSE: ICD-10-CM

## 2024-09-30 DIAGNOSIS — Z12.4 SCREENING FOR CERVICAL CANCER: ICD-10-CM

## 2024-09-30 DIAGNOSIS — R03.0 WHITE COAT SYNDROME WITHOUT DIAGNOSIS OF HYPERTENSION: ICD-10-CM

## 2024-09-30 DIAGNOSIS — R74.01 TRANSAMINITIS: ICD-10-CM

## 2024-09-30 DIAGNOSIS — Z23 NEED FOR COVID-19 VACCINE: ICD-10-CM

## 2024-09-30 DIAGNOSIS — J43.9 PULMONARY EMPHYSEMA, UNSPECIFIED EMPHYSEMA TYPE (HCC): ICD-10-CM

## 2024-09-30 DIAGNOSIS — R93.0 ABNORMAL MRI OF HEAD: ICD-10-CM

## 2024-09-30 PROBLEM — F11.20 OPIOID DEPENDENCE (HCC): Status: RESOLVED | Noted: 2021-12-09 | Resolved: 2024-09-30

## 2024-09-30 PROBLEM — K64.8 INTERNAL HEMORRHOIDS: Status: RESOLVED | Noted: 2020-05-18 | Resolved: 2024-09-30

## 2024-09-30 PROBLEM — F11.90 CHRONIC, CONTINUOUS USE OF OPIOIDS: Status: RESOLVED | Noted: 2021-12-09 | Resolved: 2024-09-30

## 2024-09-30 PROBLEM — F11.90 CHRONIC, CONTINUOUS USE OF OPIOIDS: Status: ACTIVE | Noted: 2021-12-09

## 2024-09-30 PROCEDURE — 99214 OFFICE O/P EST MOD 30 MIN: CPT | Performed by: FAMILY MEDICINE

## 2024-09-30 PROCEDURE — 99396 PREV VISIT EST AGE 40-64: CPT | Performed by: FAMILY MEDICINE

## 2024-09-30 PROCEDURE — 90673 RIV3 VACCINE NO PRESERV IM: CPT

## 2024-09-30 PROCEDURE — 90480 ADMN SARSCOV2 VAC 1/ONLY CMP: CPT

## 2024-09-30 PROCEDURE — 90471 IMMUNIZATION ADMIN: CPT

## 2024-09-30 PROCEDURE — 91320 SARSCV2 VAC 30MCG TRS-SUC IM: CPT

## 2024-09-30 RX ORDER — VENLAFAXINE HYDROCHLORIDE 75 MG/1
75 CAPSULE, EXTENDED RELEASE ORAL DAILY
Qty: 90 CAPSULE | Refills: 0 | Status: SHIPPED | OUTPATIENT
Start: 2024-09-30 | End: 2024-10-07

## 2024-09-30 NOTE — ASSESSMENT & PLAN NOTE
Patient is complaining about poor sleep I recommend she discuss this with her psychiatrist who prescribes trazodone for her sleep

## 2024-09-30 NOTE — ASSESSMENT & PLAN NOTE
Pleat cessation recommended  Orders:    CBC; Future    Comprehensive metabolic panel    Lipid Panel with Direct LDL reflex    TSH, 3rd generation with Free T4 reflex    UA (URINE) with reflex to Scope; Future

## 2024-09-30 NOTE — PATIENT INSTRUCTIONS
Patient's positive depression screen she should discuss this with her psychiatrist  Complete blood work as ordered  Discuss insomnia therapy with your psychiatrist to prescribe trazodone you may need an increase  Complete CT of the chest for follow-up of a lung nodule  I recommend complete tobacco cessation  Complete mammography for breast cancer screening  See GYN for cervical cancer screening  Follow with all specialist per their instructions  Recheck 6 months sooner if needed

## 2024-09-30 NOTE — ASSESSMENT & PLAN NOTE
Overdue for CT screening, reordered  Orders:    CBC; Future    Comprehensive metabolic panel    Lipid Panel with Direct LDL reflex    TSH, 3rd generation with Free T4 reflex    UA (URINE) with reflex to Scope; Future

## 2024-09-30 NOTE — PROGRESS NOTES
Adult Annual Physical  Name: Nuris Infante      : 1970      MRN: [id number]  Encounter Provider: Bruce Leon DO  Encounter Date: 2024   Encounter department: [facility]    Assessment & Plan  Healthcare maintenance  Patient seen examined chart reviewed  Blood work is ordered  Influenza and COVID vaccines given  Orders:    CBC; Future    Comprehensive metabolic panel    Lipid Panel with Direct LDL reflex    TSH, 3rd generation with Free T4 reflex    UA (URINE) with reflex to Scope; Future    Screening for cervical cancer  Further GYN  Orders:    CBC; Future    Comprehensive metabolic panel    Lipid Panel with Direct LDL reflex    TSH, 3rd generation with Free T4 reflex    UA (URINE) with reflex to Scope; Future    Ambulatory referral to Obstetrics / Gynecology; Future    Screening mammogram for breast cancer  Mammogram is ordered  Orders:    CBC; Future    Comprehensive metabolic panel    Lipid Panel with Direct LDL reflex    TSH, 3rd generation with Free T4 reflex    UA (URINE) with reflex to Scope; Future    Mammo screening bilateral w 3d and cad; Future    Positive depression screening  Patient follows with psychiatrist  Orders:    CBC; Future    Comprehensive metabolic panel    Lipid Panel with Direct LDL reflex    TSH, 3rd generation with Free T4 reflex    UA (URINE) with reflex to Scope; Future    Pulmonary emphysema, unspecified emphysema type (HCC)  Able lungs are clear patient to follow with pulmonary medicine  Orders:    CT chest wo contrast; Future    CBC; Future    Comprehensive metabolic panel    Lipid Panel with Direct LDL reflex    TSH, 3rd generation with Free T4 reflex    UA (URINE) with reflex to Scope; Future    Pulmonary nodule 1 cm or greater in diameter  Overdue for CT screening, reordered  Orders:    CBC; Future    Comprehensive metabolic panel    Lipid Panel with Direct LDL reflex    TSH, 3rd generation with Free T4 reflex    UA (URINE) with reflex to  Scope; Future    Recurrent major depressive disorder, in full remission (HCC)  Depression Screening Follow-up Plan: Patient's depression screening was positive with a PHQ-9 score of 16. Continue regular follow-up with their psychologist/therapist/psychiatrist who is managing their mental health condition(s).  Patient to follow with her psychiatrist       Abnormal MRI of head  Patient to follow with neurology  Orders:    CBC; Future    Comprehensive metabolic panel    Lipid Panel with Direct LDL reflex    TSH, 3rd generation with Free T4 reflex    UA (URINE) with reflex to Scope; Future    Hyperglycemia  Blood work is ordered  Orders:    CBC; Future    Comprehensive metabolic panel    Lipid Panel with Direct LDL reflex    TSH, 3rd generation with Free T4 reflex    UA (URINE) with reflex to Scope; Future    Transaminitis  Blood work ordered  Orders:    CBC; Future    Comprehensive metabolic panel    Lipid Panel with Direct LDL reflex    TSH, 3rd generation with Free T4 reflex    UA (URINE) with reflex to Scope; Future    Needs flu shot    Orders:    influenza vaccine, recombinant, PF, 0.5 mL IM (Flublok)    CBC; Future    Comprehensive metabolic panel    Lipid Panel with Direct LDL reflex    TSH, 3rd generation with Free T4 reflex    UA (URINE) with reflex to Scope; Future    Need for COVID-19 vaccine    Orders:    COVID-19 Pfizer mRNA vaccine 12 yr and older (Comirnaty pre-filled syringe)    CBC; Future    Comprehensive metabolic panel    Lipid Panel with Direct LDL reflex    TSH, 3rd generation with Free T4 reflex    UA (URINE) with reflex to Scope; Future    White coat syndrome without diagnosis of hypertension  Stable off medication we will continue to monitor    Orders:    CBC; Future    Comprehensive metabolic panel    Lipid Panel with Direct LDL reflex    TSH, 3rd generation with Free T4 reflex    UA (URINE) with reflex to Scope; Future    Tobacco abuse  Pleat cessation recommended  Orders:    CBC; Future     Comprehensive metabolic panel    Lipid Panel with Direct LDL reflex    TSH, 3rd generation with Free T4 reflex    UA (URINE) with reflex to Scope; Future    Opioid use, unspecified, in remission  No longer on narcotic/opioids  Orders:    CBC; Future    Comprehensive metabolic panel    Lipid Panel with Direct LDL reflex    TSH, 3rd generation with Free T4 reflex    UA (URINE) with reflex to Scope; Future    Mixed hyperlipidemia  Blood work is ordered  Orders:    CBC; Future    Comprehensive metabolic panel    Lipid Panel with Direct LDL reflex    TSH, 3rd generation with Free T4 reflex    UA (URINE) with reflex to Scope; Future    Other insomnia  Patient is complaining about poor sleep I recommend she discuss this with her psychiatrist who prescribes trazodone for her sleep       Immunizations and preventive care screenings were discussed with patient today. Appropriate education was printed on patient's after visit summary.    Counseling:  Cancer screening      Depression Screening and Follow-up Plan: Patient's depression screening was positive with a PHQ-9 score of 16.         History of Present Illness     Adult Annual Physical:  Patient presents for annual physical.     Diet and Physical Activity:  - Diet/Nutrition: well balanced diet.  - Exercise: no formal exercise.    Depression Screening:    - PHQ-9 Score: 16    General Health:  - Sleep: sleeps poorly.  - Hearing: normal hearing right ear.  - Vision: no vision problems.  - Dental: no dental visits for > 1 year.    /GYN Health:  - Follows with GYN: yes.   - Menopause: postmenopausal.   - History of STDs: no    Advanced Care Planning:  - Has an advanced directive?: no    - Has a durable medical POA?: no    - ACP document given to patient?: yes      Review of Systems   Constitutional: Negative.    HENT: Negative.     Eyes: Negative.    Respiratory:          Patient not complete CT follow-up for lung nodule, reordered   Cardiovascular: Negative.   "  Gastrointestinal: Negative.    Endocrine: Negative.    Genitourinary:         Overdue for GYN exam, referred   Musculoskeletal: Negative.    Skin: Negative.    Allergic/Immunologic: Negative.    Neurological:         Patient to follow with neurosurgery for chronic back and peroneal neuropathy   Hematological: Negative.    Psychiatric/Behavioral: Negative.           Objective     /78   Pulse 94   Ht 5' 6\" (1.676 m)   Wt 59.4 kg (131 lb)   LMP 03/14/2019 (Approximate)   SpO2 95%   BMI 21.14 kg/m²     Physical Exam  Vitals and nursing note reviewed.   Constitutional:       Appearance: Normal appearance.   HENT:      Head: Normocephalic and atraumatic.      Right Ear: Tympanic membrane normal.      Left Ear: Tympanic membrane normal.      Nose: Nose normal.      Mouth/Throat:      Mouth: Mucous membranes are moist.      Pharynx: Oropharynx is clear. No oropharyngeal exudate or posterior oropharyngeal erythema.   Eyes:      General: No scleral icterus.  Neck:      Vascular: No carotid bruit.   Cardiovascular:      Rate and Rhythm: Normal rate and regular rhythm.      Heart sounds: Normal heart sounds.   Pulmonary:      Effort: Pulmonary effort is normal.      Breath sounds: Normal breath sounds.   Abdominal:      General: Bowel sounds are normal.      Tenderness: There is no abdominal tenderness.   Musculoskeletal:      Cervical back: Neck supple.      Right lower leg: No edema.      Left lower leg: No edema.   Skin:     General: Skin is warm and dry.   Neurological:      General: No focal deficit present.      Mental Status: She is alert.   Psychiatric:         Mood and Affect: Mood normal.       Depression Screening Follow-up Plan: Patient's depression screening was positive with a PHQ-2 score of . Their PHQ-9 score was 16. Patient advised to follow-up with PCP for further management.  "

## 2024-09-30 NOTE — ASSESSMENT & PLAN NOTE
Blood work ordered  Orders:    CBC; Future    Comprehensive metabolic panel    Lipid Panel with Direct LDL reflex    TSH, 3rd generation with Free T4 reflex    UA (URINE) with reflex to Scope; Future

## 2024-09-30 NOTE — ASSESSMENT & PLAN NOTE
Able lungs are clear patient to follow with pulmonary medicine  Orders:    CT chest wo contrast; Future    CBC; Future    Comprehensive metabolic panel    Lipid Panel with Direct LDL reflex    TSH, 3rd generation with Free T4 reflex    UA (URINE) with reflex to Scope; Future

## 2024-09-30 NOTE — ASSESSMENT & PLAN NOTE
Blood work is ordered  Orders:    CBC; Future    Comprehensive metabolic panel    Lipid Panel with Direct LDL reflex    TSH, 3rd generation with Free T4 reflex    UA (URINE) with reflex to Scope; Future

## 2024-09-30 NOTE — ASSESSMENT & PLAN NOTE
No longer on narcotic/opioids  Orders:    CBC; Future    Comprehensive metabolic panel    Lipid Panel with Direct LDL reflex    TSH, 3rd generation with Free T4 reflex    UA (URINE) with reflex to Scope; Future

## 2024-09-30 NOTE — ASSESSMENT & PLAN NOTE
[facility]        If you are experiencing any symptoms after discharge, please follow up with your doctor for further instructions.    If you are more than 1 hour late to your scheduled appointment, the appointment will be canceled and rescheduled.    If you are running late to your appointment, please call the phone number listed below to inform us.    [facility]  [address] [address]    Hours of Operation  Monday -Friday 8:00am - 4:00 pm    P: [phone]   Option #1: Scheduling         **If your infusion requires blood work be sure to have labs drawn 24-48 hrs prior to your scheduled appointment **      Blood work is ordered  Orders:    CBC; Future    Comprehensive metabolic panel    Lipid Panel with Direct LDL reflex    TSH, 3rd generation with Free T4 reflex    UA (URINE) with reflex to Scope; Future

## 2024-09-30 NOTE — ASSESSMENT & PLAN NOTE
Patient to follow with neurology  Orders:    CBC; Future    Comprehensive metabolic panel    Lipid Panel with Direct LDL reflex    TSH, 3rd generation with Free T4 reflex    UA (URINE) with reflex to Scope; Future

## 2024-09-30 NOTE — ASSESSMENT & PLAN NOTE
Stable off medication we will continue to monitor    Orders:    CBC; Future    Comprehensive metabolic panel    Lipid Panel with Direct LDL reflex    TSH, 3rd generation with Free T4 reflex    UA (URINE) with reflex to Scope; Future

## 2024-09-30 NOTE — ASSESSMENT & PLAN NOTE
Depression Screening Follow-up Plan: Patient's depression screening was positive with a PHQ-9 score of 16. Continue regular follow-up with their psychologist/therapist/psychiatrist who is managing their mental health condition(s).  Patient to follow with her psychiatrist

## 2024-10-01 RX ORDER — TRAZODONE HYDROCHLORIDE 100 MG/1
150 TABLET ORAL
Qty: 90 TABLET | Refills: 0 | Status: SHIPPED | OUTPATIENT
Start: 2024-10-01 | End: 2024-10-07

## 2024-10-07 ENCOUNTER — OFFICE VISIT (OUTPATIENT)
Dept: PSYCHIATRY | Facility: CLINIC | Age: 54
End: 2024-10-07
Payer: COMMERCIAL

## 2024-10-07 ENCOUNTER — TELEPHONE (OUTPATIENT)
Dept: PSYCHIATRY | Facility: CLINIC | Age: 54
End: 2024-10-07

## 2024-10-07 DIAGNOSIS — G47.09 OTHER INSOMNIA: ICD-10-CM

## 2024-10-07 DIAGNOSIS — F41.1 GENERALIZED ANXIETY DISORDER: Primary | ICD-10-CM

## 2024-10-07 DIAGNOSIS — F33.2 SEVERE EPISODE OF RECURRENT MAJOR DEPRESSIVE DISORDER, WITHOUT PSYCHOTIC FEATURES (HCC): ICD-10-CM

## 2024-10-07 DIAGNOSIS — F11.20 UNCOMPLICATED OPIOID DEPENDENCE (HCC): ICD-10-CM

## 2024-10-07 PROCEDURE — 99214 OFFICE O/P EST MOD 30 MIN: CPT | Performed by: STUDENT IN AN ORGANIZED HEALTH CARE EDUCATION/TRAINING PROGRAM

## 2024-10-07 PROCEDURE — 90833 PSYTX W PT W E/M 30 MIN: CPT | Performed by: STUDENT IN AN ORGANIZED HEALTH CARE EDUCATION/TRAINING PROGRAM

## 2024-10-07 RX ORDER — VENLAFAXINE HYDROCHLORIDE 75 MG/1
75 CAPSULE, EXTENDED RELEASE ORAL DAILY
Qty: 90 CAPSULE | Refills: 0 | Status: SHIPPED | OUTPATIENT
Start: 2024-10-07 | End: 2025-01-05

## 2024-10-07 RX ORDER — BUSPIRONE HYDROCHLORIDE 10 MG/1
15 TABLET ORAL 3 TIMES DAILY
Qty: 405 TABLET | Refills: 0 | Status: SHIPPED | OUTPATIENT
Start: 2024-10-07 | End: 2025-01-05

## 2024-10-07 RX ORDER — VENLAFAXINE HYDROCHLORIDE 150 MG/1
150 CAPSULE, EXTENDED RELEASE ORAL DAILY
Qty: 30 CAPSULE | Refills: 0 | Status: SHIPPED | OUTPATIENT
Start: 2024-10-07 | End: 2025-01-05

## 2024-10-07 NOTE — PSYCH
"MEDICATION MANAGEMENT NOTE        [facility]      Name and Date of Birth:  Nuris Infante 54 y.o. 1970 MRN: [id number]    Date of Visit: October 7, 2024    Reason for Visit: Follow-up visit regarding medication management     Chief Complaint: \"I am not sleeping\"    SUBJECTIVE:    Nuris Infante is a 54 y.o., female, with a past psychiatric history significant for major depressive disorder, generalized anxiety disorder, medically complicated by IBS, COPD, hypertension, degenerative disc disease, who presents for follow-up appointment for medication management. Nuris states that since their previous outpatient psychiatric appointment, she is having trouble with sleep. She reports that she continues to have difficulty with sleep.  Mostly initiating and maintaining sleep.  Feels that she is unrested and tired throughout the day then.  Anxiety levels are also increased during the day, havingFeels that constant feeling of tense on edge and keyed up.  She finds that she is unable to assist in relaxing herself down, feels that the BuSpar \"does nothing\".  Reports that she does struggle with sadness and hopelessness at times, mostly due to her medical conditions reports that she continues to have problems with neuropathy and at times will trip or fall down the stairs (no major injuries from this).  She is frustrated at times and feels that \"everything is going wrong\".  She reports that her parents are also dealing with some medical issues and she has some guilt about this and also frequently argues and is upset with her .  She again is preoccupied about restarting benzodiazepines or hypnotic medications.  Denies any SI, HI, AVH.  Reports that she continues to drink 1 to 2 glasses of wine a few times per week.    Current Rating Scores:   Current PHQ-9   PHQ-2/9 Depression Screening             Psychiatric Review Of Systems:    Appetite: decreased  Adverse " eating: no  Weight changes: decreased  Insomnia/sleeplessness: decreased  Fatigue/anergy: decreased  Anhedonia/lack of interest: no  Attention/concentration: no  Psychomotor agitation/retardation: no  Somatic symptoms: no  Anxiety/panic attack: yes, worrying  Dalia/hypomania: no  Hopelessness/helplessness/worthlessness: yes  Self-injurious behavior/high-risk behavior: no  Suicidal ideation: no  Homicidal ideation: no  Auditory hallucinations: no  Visual hallucinations: no  Other perceptual disturbances: no  Delusional thinking: no  Obsessive/compulsive symptoms: no    Review Of Systems:      Constitutional negative   ENT negative   Cardiovascular negative   Respiratory negative   Gastrointestinal negative   Genitourinary negative   Musculoskeletal negative   Integumentary negative   Neurological negative   Endocrine negative   Other Symptoms none, all other systems are negative     History Review:   The following portions of the patient's history were reviewed and updated as appropriate: allergies, current medications, past family history, past medical history, past social history, past surgical history, and problem list. Previous progress notes/assessments from other providers reviewed as available.    Past Medical History:    Past Medical History:   Diagnosis Date    ARF (acute renal failure) (Formerly [facility]) 9/19/2022    Aspiration pneumonitis (Formerly [facility]) 10/1/2022    Bacteremia due to methicillin susceptible Staphylococcus aureus (MSSA) 9/27/2022    Chronic pain disorder     Depression     Foreign body of right eye 9/27/2023    GERD (gastroesophageal reflux disease)     History of electroconvulsive therapy     Localized swelling of right upper extremity 9/26/2022    Low back pain     Self-injurious behavior     Suicide attempt (Formerly [facility])         Allergies   Allergen Reactions    Chantix [Varenicline]     Ibuprofen Other (See Comments)     Upset stomach    Lyrica [Pregabalin] Other (See Comments)     bruising    Penicillins Other (See  "Comments)     ? hives    Sulfa Antibiotics Other (See Comments)     sloughing skin in mouth    Sulfasalazine        Substance Abuse History:    Social History     Substance and Sexual Activity   Alcohol Use Not Currently    Comment: \"occasional glass of wine\"     Social History     Substance and Sexual Activity   Drug Use Not Currently    Types: Marijuana, Cocaine    Comment: medical, MARIJUANA       Social History:    Social History     Socioeconomic History    Marital status: /Civil Union     Spouse name: Not on file    Number of children: Not on file    Years of education: Not on file    Highest education level: Not on file   Occupational History    Occupation: disability   Tobacco Use    Smoking status: Some Days     Current packs/day: 0.50     Average packs/day: 0.5 packs/day for 35.0 years (17.5 ttl pk-yrs)     Types: Cigarettes    Smokeless tobacco: Never   Vaping Use    Vaping status: Never Used   Substance and Sexual Activity    Alcohol use: Not Currently     Comment: \"occasional glass of wine\"    Drug use: Not Currently     Types: Marijuana, Cocaine     Comment: medical, MARIJUANA    Sexual activity: Yes     Partners: Male     Birth control/protection: Post-menopausal     Comment: PT is    Other Topics Concern    Not on file   Social History Narrative    Lives with spouse     Social Determinants of Health     Financial Resource Strain: Not on file   Food Insecurity: No Food Insecurity (9/18/2023)    Received [facility], [facility]    Hunger Vital Sign     Worried About Running Out of Food in the Last Year: Never true     Ran Out of Food in the Last Year: Never true   Transportation Needs: No Transportation Needs (9/18/2023)    Received from [facility], [facility]    PRAPARE - Transportation     Lack of Transportation (Medical): No     Lack of Transportation " (Non-Medical): No   Physical Activity: Not on file   Stress: Not on file   Social Connections: Not on file   Intimate Partner Violence: Not on file   Housing Stability: Low Risk  (9/18/2023)    [facility] from [facility], [facility]    Housing Stability Vital Sign     Unable to Pay for Housing in the Last Year: No     Number of Places Lived in the Last Year: 1     Unstable Housing in the Last Year: No       Family Psychiatric History:     Family History   Problem Relation Age of Onset    Arthritis Mother     Coronary artery disease Mother         MI in her 60's    Parkinsonism Father         with falls and SDH    Parkinsonism Paternal Grandmother     Coronary artery disease Paternal Grandfather     Parkinsonism Paternal Aunt     Colon cancer Family     Depression Neg Hx             OBJECTIVE:     Vital signs in last 24 hours:    There were no vitals filed for this visit.    Mental Status Evaluation:    Appearance age appropriate, casually dressed   Behavior cooperative, calm   Speech normal rate, normal volume, normal pitch   Mood depressed   Affect normal range and intensity, appropriate   Thought Processes organized, goal directed   Associations intact associations   Thought Content no overt delusions   Perceptual Disturbances: no auditory hallucinations, no visual hallucinations   Abnormal Thoughts  Risk Potential Suicidal ideation - None  Homicidal ideation - None  Potential for aggression - No   Orientation oriented to person, place, time/date, and situation   Memory recent and remote memory grossly intact   Consciousness alert and awake   Attention Span Concentration Span attention span and concentration are age appropriate   Intellect appears to be of average intelligence   Insight intact   Judgement intact   Muscle Strength and  Gait normal muscle strength and normal muscle tone, normal gait and normal balance   Motor activity no abnormal movements    Fund of Knowledge adequate knowledge of current events  adequate fund of knowledge regarding past history  adequate fund of knowledge regarding vocabulary    Pain none   Pain Scale 0       Laboratory Results: I have personally reviewed all pertinent laboratory/tests results    Recent Labs (last 4 months):   No visits with results within 4 Month(s) from this visit.   Latest known visit with results is:   Admission on 06/07/2023, Discharged on 06/07/2023   Component Date Value    WBC 06/07/2023 10.07     RBC 06/07/2023 4.68     Hemoglobin 06/07/2023 14.4     Hematocrit 06/07/2023 45.1     MCV 06/07/2023 96     MCH 06/07/2023 30.8     MCHC 06/07/2023 31.9     RDW 06/07/2023 14.5     MPV 06/07/2023 9.4     Platelets 06/07/2023 250     nRBC 06/07/2023 0     Segmented % 06/07/2023 61     Immature Grans % 06/07/2023 0     Lymphocytes % 06/07/2023 31     Monocytes % 06/07/2023 6     Eosinophils Relative 06/07/2023 1     Basophils Relative 06/07/2023 1     Absolute Neutrophils 06/07/2023 6.22     Absolute Immature Grans 06/07/2023 0.02     Absolute Lymphocytes 06/07/2023 3.07     Absolute Monocytes 06/07/2023 0.62     Eosinophils Absolute 06/07/2023 0.06     Basophils Absolute 06/07/2023 0.08     Sodium 06/07/2023 134 (L)     Potassium 06/07/2023 4.0     Chloride 06/07/2023 95 (L)     CO2 06/07/2023 33 (H)     ANION GAP 06/07/2023 6     BUN 06/07/2023 11     Creatinine 06/07/2023 0.90     Glucose 06/07/2023 129     Calcium 06/07/2023 10.0     eGFR 06/07/2023 72     Lipase 06/07/2023 8 (L)          Summary:  Nuris Infante is a 52-year-old female who carries a past psychiatric history significant for generalized anxiety disorder and major depressive disorder.  She presents to establish care in his transfer of services from [facility].  She has a long history of depression and anxiety stemming back to childhood, fluctuating throughout her lifetime and has trialed various antidepressant medications.  She has been on  Effexor for the longest period of time which seemingly has been helpful.  There is a history of abuse, in the past heavily abusing alcohol although has been sober from this for 16 years.  She has had some benzodiazepine abuse as well, more recently being admitted to the hospital for encephalopathy after an overdose on Xanax.  She is struggling with sleep with anxiety symptoms at this time and feels that depression is mostly stable.  Her anxiety seems to stem from numerous medical conditions , most recently neuropathy and chronic back pain which seems to be limiting her physical mobility.    5/18/23 -worsening anxiety, mostly stemming from an upcoming EMG.  She requests benzos initially although we discussed potential for abuse.  She will try increased dose of Effexor to 225 mg and as needed Vistaril.  In the future consider adjusting Effexor to Cymbalta for neuropathic pain.  We will also consider a TCA if needed - perhaps nortriptyline.    7/5/23 -reporting increased anxiety symptoms.  Atarax was ineffective and stopped using this as a as needed.  Also struggling with poor sleep and chronic issues with pain related to peripheral nerves.  Followed with neurosurgery consultation of whom has recommendations to be evaluated by a physician at [facility].  Relationship issues are ongoing with her , she is considering separation.  She is open to therapy which she had not been previously.  We will place referral and increase Seroquel to target sleep symptoms. May consider a brief use of sleep aid if Seroquel optimization is not effective.    8/21/23 Still working with insomnia, no relief from the increase in Seroquel.  Also chronic pain related to recent nerve peroneal decompression of right foot.  She was amenable to starting trazodone 100 mg at bedtime as needed for insomnia. If this is not effective will consider use of Lunesta which she benefited from in the past.    11/24/23 She is having more anxiety -  related to relationship issue with . She is still having trouble sleeping, averaging 2 hours per night.  We discussed various medication options to help with her sleep. Z Hypnotics are options though I am concerned about intermittent alcohol use. Seroquel and trazodone both increase risk for falls as well. We discussed reducing polypharmacy and trying Belsomra to help with sleep. Counseled about risk of sedation and risk of side effects and falls if used in combination with alcohol. She expressed understanding.    1/31/24 She is feeling more anxious and depressed.  Reports that she is still not sleeping.  She was unable to get the Belsomra due to insurance issues.  She again is requesting Lunesta although given some concerns with alcohol in the past we will defer at this time.  She was open to retrying Remeron to target depression, anxiety, sleep.  We will discontinue trazodone.    3/20/24 She has been more depressed over past week - mostly due to financial concerns, 's unemployment, her inability to drive, as well as relationship stress with her daughter. She feels overwhelmed from efforts she is putting into assisting with others. Sleep is still poor with trouble maintaining sleep; Remeron was helpful initially but effect dwindled. In agreement to increase it today.      7/5/24 She is feeling more anxious and stressed. Having difficulty with sleep.  Feels that the Remeron was not helpful at all.  Stress remains from financial concerns, mostly insurance related that she may be losing her medical assistance in the upcoming weeks.  She also is having relationship difficulties with her .  She inquires today again about controlled substances, namely sleep aid medications.  We will avoid these given history of alcohol abuse.  Discussed trialing trazodone again to assist with sleep and she was open to this.    10/7/24 She still struggling with sleep issues, ongoing anxiety and frustration with  medical issues and concerns.  She is also having some health issues with her parents which is also increasing her stress.  We discussed avoiding controlled substances due to risk for worsening potential for falls and also concern for concurrent use of alcohol.  She was open to trying Belsomra which we had attempted in the past although she was unable to obtain due to lack of insurance.Will discontinue Trazodone.    Assessment & Plan  Generalized anxiety disorder    Orders:    venlafaxine (EFFEXOR-XR) 150 mg 24 hr capsule; Take 1 capsule (150 mg total) by mouth daily Take in combination with 75mg Effexor for total daily dose of (225mg)    venlafaxine (EFFEXOR-XR) 75 mg 24 hr capsule; Take 1 capsule (75 mg total) by mouth daily Take in combination with 150mg Effexor for total daily dose of (225mg)    busPIRone (BUSPAR) 10 mg tablet; Take 1.5 tablets (15 mg total) by mouth 3 (three) times a day    Severe episode of recurrent major depressive disorder, without psychotic features (HCC)    Orders:    venlafaxine (EFFEXOR-XR) 150 mg 24 hr capsule; Take 1 capsule (150 mg total) by mouth daily Take in combination with 75mg Effexor for total daily dose of (225mg)    venlafaxine (EFFEXOR-XR) 75 mg 24 hr capsule; Take 1 capsule (75 mg total) by mouth daily Take in combination with 150mg Effexor for total daily dose of (225mg)    Other insomnia    Orders:    Suvorexant 10 MG TABS; Take 1 tablet (10 mg total) by mouth daily at bedtime    Uncomplicated opioid dependence (HCC)             Additonal Recommendations/Precautions:    Aware of need to follow up with family physician for medical issues  Aware of 24 hour and weekend coverage for urgent situations accessed by calling [facility] main practice number    Medications Risks/Benefits      Risks, Benefits And Possible Side Effects Of Medications:    Risks, benefits, and possible side effects of medications explained to Nuris including risk of suicidality  and serotonin syndrome related to treatment with antidepressants. She verbalizes understanding and agreement for treatment..    Controlled Medication Discussion:     Nuris has been filling controlled prescriptions on time as prescribed according to [place] Prescription Drug Monitoring Program  Discussed with Nuris the risks of sedation, respiratory depression, impairment of ability to drive and potential for abuse and addiction related to treatment with benzodiazepine medications. She understands risk of treatment with benzodiazepine medications, agrees to not drive if feels impaired and agrees to take medications as prescribed  Discussed with Nuris Black Box warning on concurrent use of benzodiazepines and opioid medications including sedation, respiratory depression, coma and death. She understands the risk of treatment with benzodiazepines in addition to opioids and wants to continue taking those medications    Psychotherapy Provided:     Individual psychotherapy provided: Yes  Counseling was provided during the session today for 16 minutes.  Recent stressor including family problems, family conflict, family issues, relationship problems, medical illness in family, health issues, medical problems, and chronic pain discussed with Nuris.   Coping skills reviewed with Nuris.   Reassurance and supportive therapy provided.      Treatment Plan:    Completed and signed during the session: Not applicable - Treatment Plan not due at this session    This note was not shared with the patient due to reasonable likelihood of causing patient harm    Visit Time    Visit Start Time: 300  Visit Stop Time: 320  Total Visit Duration:  20 minutes        Abdias Ribera MD 10/07/24

## 2024-10-07 NOTE — TELEPHONE ENCOUNTER
Received the following staff message:   Abdias Ribera MD  [facility]  [facility],    I just sent Belsomra over for Nuris; she should definitely be a candidate for it based on how many medications she has tried. I am thinking she may need a prior auth though - would you be able to see if she needs one?    Thank you!    Attempted to call the pharmacy 2x and both times they hung up without answering. Will try again tomorrow.

## 2024-10-07 NOTE — ASSESSMENT & PLAN NOTE
Orders:    venlafaxine (EFFEXOR-XR) 150 mg 24 hr capsule; Take 1 capsule (150 mg total) by mouth daily Take in combination with 75mg Effexor for total daily dose of (225mg)    venlafaxine (EFFEXOR-XR) 75 mg 24 hr capsule; Take 1 capsule (75 mg total) by mouth daily Take in combination with 150mg Effexor for total daily dose of (225mg)    busPIRone (BUSPAR) 10 mg tablet; Take 1.5 tablets (15 mg total) by mouth 3 (three) times a day

## 2024-10-08 NOTE — TELEPHONE ENCOUNTER
Called pharmacy  [facility] [id number]   PCN [id number]   GRP [id number]  ID [id number]   PHONE NUMBER OF INSURANCE   NAME OF INSURANCE [employer]    Tried to submit PA on Cover my meds and the ID # is invalid. Called pharmacy back to make almaz I had it written down correctly, pharmacist confirmed that the above is the ID #.     Called patient to obtain insurance info, unable to leave message, voice mail box is full.

## 2024-10-08 NOTE — TELEPHONE ENCOUNTER
Spoke with pharmacist. Prior authorization is needed for Suvorexant 10 mg tabs. Forwarding to PA team. Attaching provider to make him aware.

## 2024-10-11 NOTE — TELEPHONE ENCOUNTER
Called patient 2nd time to obtain insurance info, unable to leave message, voice mail box is full.

## 2024-10-14 ENCOUNTER — TELEPHONE (OUTPATIENT)
Dept: PSYCHIATRY | Facility: CLINIC | Age: 54
End: 2024-10-14

## 2024-10-14 NOTE — TELEPHONE ENCOUNTER
Reason for call:   [x] Prior Auth  [] Other:     Caller:  [x] Patient  [] Pharmacy  Name:   Address:   Callback Number:     Medication: Suvorexant 10 MG Take 1 tablet (10 mg total) by mouth daily at bedtime       Ordering Provider:   [] PCP/Provider -   [x] Speciality/Provider - [facility]     Has the patient tried other medications and failed? If failed, which medications did they fail?    [] No   [x] Yes -     Is the patient's insurance updated in EPIC?   [x] Yes   [] No     Is a copy of the patient's insurance scanned in EPIC?   [] Yes   [] No

## 2024-10-16 NOTE — TELEPHONE ENCOUNTER
Called [facility] obtain phone number for insurance, pharmacist gave [facility] Dilip prior dept dept [phone].    Called [facility] PA dept, rep could not find patient with ID #, she transferred me to member services so they could do a search  Spoke to member services, was informed by rep   ID #: [id number] not on file, no active acct.  Rep searched by ID #, address, name and . Rep found nothing.

## 2024-10-16 NOTE — TELEPHONE ENCOUNTER
Called and left patient message to call office and pharmacy and provide current insurance info.     Insurance info office and pharmacy have:   BIN [id number]   PCN [id number]   GRP [id number]  ID [id number]   PHONE NUMBER OF INSURANCE   NAME OF INSURANCE [employer]

## 2024-10-16 NOTE — TELEPHONE ENCOUNTER
LM on Nuris's VM informing her that PA team called her insurance company and they were not able to locate her using any of her identifiers.  Instructed her to call them and then call us back with outcome.

## 2024-10-16 NOTE — TELEPHONE ENCOUNTER
Duplicate encounter created, please see telephone encounter from 10/7/2024 regarding Suvorexant 10 MG TABS  PA status. Please review patient's chart to see if there is already an encounter regarding the medication in question and to document anything regarding this medication in regards to anything regarding the authorization process etc before creating another encounter Thank You.

## 2024-10-18 ENCOUNTER — TELEPHONE (OUTPATIENT)
Age: 54
End: 2024-10-18

## 2024-10-18 NOTE — TELEPHONE ENCOUNTER
Masha called from RX benefits called re BUSPAR 10 mg tablet needing a Prior auth. Masha said website is [email or web address].When done u can fax it to number on top.

## 2024-10-18 NOTE — TELEPHONE ENCOUNTER
Patient called to let the nurses know she received the message left and she is still working on it.

## 2024-10-18 NOTE — TELEPHONE ENCOUNTER
Nurse spoke with Nuris.    Nuris states she reached out to her insurance and was told that the PA was approved on 9/20/24 with AUTH # [id number].      Nurse spoke with pharmacist who tried to run the script again through insurance and was told a PA was still needed.     Nurse called Nuris and left message regarding same, Awaiting return call.

## 2024-10-18 NOTE — TELEPHONE ENCOUNTER
The patient contacted the office, requesting to speak with the RN regarding PA outcome. The writer transferred the call to the clinical staff for assistance.

## 2024-10-19 DIAGNOSIS — G57.31 PERONEAL NEUROPATHY, RIGHT: ICD-10-CM

## 2024-10-21 DIAGNOSIS — K21.9 GASTROESOPHAGEAL REFLUX DISEASE WITHOUT ESOPHAGITIS: ICD-10-CM

## 2024-10-21 RX ORDER — GABAPENTIN 400 MG/1
CAPSULE ORAL
Qty: 120 CAPSULE | Refills: 0 | Status: SHIPPED | OUTPATIENT
Start: 2024-10-21

## 2024-10-21 NOTE — TELEPHONE ENCOUNTER
PA for buspirone 10 mg tablet SUBMITTED     via    []CMM-KEY:   []Genia-Case ID #   []Availity-Auth ID # NDC #   [x]Faxed to plan-RX Benefits [phone]  []Other website   []Phone call Case ID #     Office notes sent, clinical questions answered. Awaiting determination    Turnaround time for your insurance to make a decision on your Prior Authorization can take 7-21 business days.

## 2024-10-23 DIAGNOSIS — F33.2 SEVERE EPISODE OF RECURRENT MAJOR DEPRESSIVE DISORDER, WITHOUT PSYCHOTIC FEATURES (HCC): ICD-10-CM

## 2024-10-23 DIAGNOSIS — F41.1 GENERALIZED ANXIETY DISORDER: ICD-10-CM

## 2024-10-23 RX ORDER — VENLAFAXINE HYDROCHLORIDE 150 MG/1
150 CAPSULE, EXTENDED RELEASE ORAL DAILY
Qty: 90 CAPSULE | Refills: 0 | Status: SHIPPED | OUTPATIENT
Start: 2024-10-23 | End: 2025-01-21

## 2024-10-24 NOTE — TELEPHONE ENCOUNTER
PA for buspirone 10 mg tablet SUBMITTED     via    []CMM-KEY:   []Surescripts-Case ID #   []Availity-Auth ID # NDC #   []Faxed to plan   [x]Other website Prompt PA RX Benefits Prior Auth (EOC) ID:[id number]  []Phone call Case ID #     Office notes sent, clinical questions answered. Awaiting determination    Turnaround time for your insurance to make a decision on your Prior Authorization can take 7-21 business days.

## 2024-10-24 NOTE — TELEPHONE ENCOUNTER
Nurse spoke with Nuris # [phone] (ok to leave detailed message on voice mail)     Nuris states PA request should be sent to Tim  # [phone]    Nuris asks to confirm that PA request was sent correctly and would like a call back to confirm.  Nuris understands the PA can take 7-21 days for a response.       Claudia - can you confirm where PA was sent? Thank you.

## 2024-10-24 NOTE — TELEPHONE ENCOUNTER
Called [employer] Benefits for update on PA for buspirone 10 mg tablet, was informed they ddi not receive the fax that was sent on 10/21 to initiate PA. She advised to complete PA on website [email or web address]

## 2024-10-24 NOTE — TELEPHONE ENCOUNTER
Called the number the patient provided [phone], I spoke to a rep and she stated PA must be submitted on their website [email or web address], they do not accept PA's over the phone.  PA was submitted on the website.     Please let patient know.

## 2024-10-25 NOTE — TELEPHONE ENCOUNTER
PA for buspirone 10 mg tablet NOT REQUIRED     Reason (screenshot if applicable)          Patient advised by          [] MyChart Message  [] Phone call   []LMOM  []L/M to call office as no active Communication consent on file  []Unable to leave detailed message as VM not approved on Communication consent       Pharmacy advised by    []Fax  [x]Phone call-pharmacist stated NO PA required, too soon to refill, was filled on 10/16

## 2024-10-29 ENCOUNTER — TELEPHONE (OUTPATIENT)
Age: 54
End: 2024-10-29

## 2024-10-29 NOTE — TELEPHONE ENCOUNTER
Patient contacted intake regarding prior authorization for Suvorexant 10 mg tab. Writer was able to get Nurse pool on the line to further assist and warm transferred pt.

## 2024-10-30 ENCOUNTER — TELEPHONE (OUTPATIENT)
Age: 54
End: 2024-10-30

## 2024-10-30 DIAGNOSIS — J43.9 PULMONARY EMPHYSEMA, UNSPECIFIED EMPHYSEMA TYPE (HCC): Primary | ICD-10-CM

## 2024-10-30 NOTE — TELEPHONE ENCOUNTER
Patient called in requesting a call back from provider regarding appt 11/1 @12:30pm.     Patient wants to know if she should keep appt or r/s it for after she gets medication that is still pending prior auth approval.    Writer informed patient msg will be relay to provider and someone will be in contact with her.

## 2024-10-30 NOTE — TELEPHONE ENCOUNTER
Patient ana m she was prescribed Rx: Breo at one pint but did not have medical insurance back then.  She has insurance now.  Patient now requesting Rx: Breo Inhaler  [facility]    Please review and advice

## 2024-10-31 RX ORDER — BUDESONIDE AND FORMOTEROL FUMARATE DIHYDRATE 160; 4.5 UG/1; UG/1
AEROSOL RESPIRATORY (INHALATION)
Qty: 10.2 G | Refills: 5 | Status: SHIPPED | OUTPATIENT
Start: 2024-10-31

## 2024-10-31 NOTE — TELEPHONE ENCOUNTER
LM on Nuris's VM informing her to call the office to schedule appointment for after she receives approval for new medication.

## 2024-11-01 ENCOUNTER — TELEPHONE (OUTPATIENT)
Dept: PSYCHIATRY | Facility: CLINIC | Age: 54
End: 2024-11-01

## 2024-11-01 NOTE — TELEPHONE ENCOUNTER
Writer called and left message to cancel appointment scheduled for today at 12:30pm with Dr. Ribera. Appointment was for Med Check. Patient stated that she has not received medication just yet. If patient calls back she can schedule for the first opening.

## 2024-11-05 NOTE — TELEPHONE ENCOUNTER
Patient called regarding new medication that pt started taking 3 days ago. Pt shared was under the impression provider wanted to see pt for a med check appt after pt starts medication. Writer attempted to schedule pt for a sooner appt, only availability was an appt this afternoon that pt couldn't make.     Pt would like to know if provider would like to see pt due to starting new medication. Pt is currently scheduled for 12/2.

## 2024-11-20 DIAGNOSIS — K21.9 GASTROESOPHAGEAL REFLUX DISEASE WITHOUT ESOPHAGITIS: ICD-10-CM

## 2024-11-21 ENCOUNTER — HOSPITAL ENCOUNTER (OUTPATIENT)
Dept: MAMMOGRAPHY | Facility: MEDICAL CENTER | Age: 54
Discharge: HOME | End: 2024-11-21
Payer: COMMERCIAL

## 2024-11-21 ENCOUNTER — APPOINTMENT (OUTPATIENT)
Dept: LAB | Facility: MEDICAL CENTER | Age: 54
End: 2024-11-21
Payer: COMMERCIAL

## 2024-11-21 ENCOUNTER — RESULTS FOLLOW-UP (OUTPATIENT)
Dept: FAMILY MEDICINE CLINIC | Facility: CLINIC | Age: 54
End: 2024-11-21

## 2024-11-21 VITALS — BODY MASS INDEX: 19.29 KG/M2 | WEIGHT: 120 LBS | HEIGHT: 66 IN

## 2024-11-21 DIAGNOSIS — E78.2 MIXED HYPERLIPIDEMIA: ICD-10-CM

## 2024-11-21 DIAGNOSIS — Z00.00 HEALTHCARE MAINTENANCE: ICD-10-CM

## 2024-11-21 DIAGNOSIS — Z72.0 TOBACCO ABUSE: ICD-10-CM

## 2024-11-21 DIAGNOSIS — R03.0 WHITE COAT SYNDROME WITHOUT DIAGNOSIS OF HYPERTENSION: ICD-10-CM

## 2024-11-21 DIAGNOSIS — Z12.31 SCREENING MAMMOGRAM FOR BREAST CANCER: ICD-10-CM

## 2024-11-21 DIAGNOSIS — E87.6 HYPOKALEMIA: ICD-10-CM

## 2024-11-21 DIAGNOSIS — R93.0 ABNORMAL MRI OF HEAD: ICD-10-CM

## 2024-11-21 DIAGNOSIS — Z23 NEEDS FLU SHOT: ICD-10-CM

## 2024-11-21 DIAGNOSIS — F11.91 OPIOID USE, UNSPECIFIED, IN REMISSION: ICD-10-CM

## 2024-11-21 DIAGNOSIS — R73.9 HYPERGLYCEMIA: ICD-10-CM

## 2024-11-21 DIAGNOSIS — Z23 NEED FOR COVID-19 VACCINE: ICD-10-CM

## 2024-11-21 DIAGNOSIS — N28.9 LOW KIDNEY FUNCTION: ICD-10-CM

## 2024-11-21 DIAGNOSIS — Z13.31 POSITIVE DEPRESSION SCREENING: ICD-10-CM

## 2024-11-21 DIAGNOSIS — D75.89 MACROCYTOSIS WITHOUT ANEMIA: Primary | ICD-10-CM

## 2024-11-21 DIAGNOSIS — R74.01 TRANSAMINITIS: ICD-10-CM

## 2024-11-21 DIAGNOSIS — J43.9 PULMONARY EMPHYSEMA, UNSPECIFIED EMPHYSEMA TYPE (HCC): ICD-10-CM

## 2024-11-21 DIAGNOSIS — R91.1 PULMONARY NODULE 1 CM OR GREATER IN DIAMETER: ICD-10-CM

## 2024-11-21 DIAGNOSIS — Z12.4 SCREENING FOR CERVICAL CANCER: ICD-10-CM

## 2024-11-21 LAB
ALBUMIN SERPL BCG-MCNC: 4.1 G/DL (ref 3.5–5)
ALP SERPL-CCNC: 98 U/L (ref 34–104)
ALT SERPL W P-5'-P-CCNC: 22 U/L (ref 7–52)
ANION GAP SERPL CALCULATED.3IONS-SCNC: 11 MMOL/L (ref 4–13)
AST SERPL W P-5'-P-CCNC: 35 U/L (ref 13–39)
BILIRUB SERPL-MCNC: 0.62 MG/DL (ref 0.2–1)
BUN SERPL-MCNC: 11 MG/DL (ref 5–25)
CALCIUM SERPL-MCNC: 9.5 MG/DL (ref 8.4–10.2)
CHLORIDE SERPL-SCNC: 98 MMOL/L (ref 96–108)
CHOLEST SERPL-MCNC: 242 MG/DL (ref ?–200)
CO2 SERPL-SCNC: 29 MMOL/L (ref 21–32)
CREAT SERPL-MCNC: 1.13 MG/DL (ref 0.6–1.3)
ERYTHROCYTE [DISTWIDTH] IN BLOOD BY AUTOMATED COUNT: 13.1 % (ref 11.6–15.1)
GFR SERPL CREATININE-BSD FRML MDRD: 54 ML/MIN/1.73SQ M
GLUCOSE P FAST SERPL-MCNC: 91 MG/DL (ref 65–99)
HCT VFR BLD AUTO: 49.8 % (ref 34.8–46.1)
HDLC SERPL-MCNC: 118 MG/DL
HGB BLD-MCNC: 16.1 G/DL (ref 11.5–15.4)
LDLC SERPL CALC-MCNC: 103 MG/DL (ref 0–100)
MCH RBC QN AUTO: 33.9 PG (ref 26.8–34.3)
MCHC RBC AUTO-ENTMCNC: 32.3 G/DL (ref 31.4–37.4)
MCV RBC AUTO: 105 FL (ref 82–98)
PLATELET # BLD AUTO: 290 THOUSANDS/UL (ref 149–390)
PMV BLD AUTO: 10.9 FL (ref 8.9–12.7)
POTASSIUM SERPL-SCNC: 3.1 MMOL/L (ref 3.5–5.3)
PROT SERPL-MCNC: 7.1 G/DL (ref 6.4–8.4)
RBC # BLD AUTO: 4.75 MILLION/UL (ref 3.81–5.12)
SODIUM SERPL-SCNC: 138 MMOL/L (ref 135–147)
TRIGL SERPL-MCNC: 103 MG/DL (ref ?–150)
TSH SERPL DL<=0.05 MIU/L-ACNC: 2.33 UIU/ML (ref 0.45–4.5)
WBC # BLD AUTO: 9.76 THOUSAND/UL (ref 4.31–10.16)

## 2024-11-21 PROCEDURE — 85027 COMPLETE CBC AUTOMATED: CPT

## 2024-11-21 PROCEDURE — 77063 BREAST TOMOSYNTHESIS BI: CPT

## 2024-11-21 PROCEDURE — 77067 SCR MAMMO BI INCL CAD: CPT

## 2024-11-22 DIAGNOSIS — K21.9 GASTROESOPHAGEAL REFLUX DISEASE WITHOUT ESOPHAGITIS: ICD-10-CM

## 2024-11-22 DIAGNOSIS — G57.31 PERONEAL NEUROPATHY, RIGHT: ICD-10-CM

## 2024-11-22 RX ORDER — GABAPENTIN 400 MG/1
CAPSULE ORAL
Qty: 120 CAPSULE | Refills: 5 | Status: SHIPPED | OUTPATIENT
Start: 2024-11-22

## 2024-11-22 NOTE — TELEPHONE ENCOUNTER
Reason for call:   [x] Refill   [] Prior Auth  [] Other:     Office:   [x] PCP/Provider -   [] Specialty/Provider -     Medication: Gabapentin    Dose/Frequency:   400 mg 2caps AM and 2caps PM    Quantity: 30D w refill    [facility]: [facility] [address]     Does the patient have enough for 3 days?   [] Yes   [x] No - Send as HP to POD

## 2024-12-02 ENCOUNTER — TELEPHONE (OUTPATIENT)
Age: 54
End: 2024-12-02

## 2024-12-02 NOTE — TELEPHONE ENCOUNTER
Patient called in inquiring to get a sooner appt.     Patient is currently scheduled for 2/25 @3:30pm.     Patient is requesting a sooner appt because she needs to discuss medication Suvorexant 10 MG  with provider.     Writer also added patient to cancellation wait list for a sooner appt.     Writer informed pt msg will be relay to provider and office.

## 2024-12-06 NOTE — TELEPHONE ENCOUNTER
Writer NATALIE letting patient know she is scheduled for a sooner appt as requested.     Writer contacted [facility] [facility] clerical staff to make sure 12/18 was still available and they said yes.     Patient has been scheduled for 12/18 @3:30pm.     Patient should be calling back to confirm if date and time works for her.

## 2024-12-09 NOTE — TELEPHONE ENCOUNTER
Patient called office back and confirmed 12/18/24 at 3:30pm. Can you please put her on the schedule. It wont let writer do so on epic

## 2024-12-18 ENCOUNTER — TELEPHONE (OUTPATIENT)
Dept: PSYCHIATRY | Facility: CLINIC | Age: 54
End: 2024-12-18

## 2024-12-18 NOTE — TELEPHONE ENCOUNTER
Date/Time: 12/18 3pm    Reason: pt presented to office in person. Pt stated she cannot do virtual visits. Please do not schedule any visits virtual.     Patient was rescheduled: YES [x] NO []  If yes, when was Patient reschedule for: 12/19 at 11:30

## 2024-12-19 ENCOUNTER — OFFICE VISIT (OUTPATIENT)
Dept: PSYCHIATRY | Facility: CLINIC | Age: 54
End: 2024-12-19

## 2024-12-19 ENCOUNTER — TELEPHONE (OUTPATIENT)
Dept: PSYCHIATRY | Facility: CLINIC | Age: 54
End: 2024-12-19

## 2024-12-19 DIAGNOSIS — Z91.199 FAILURE TO ATTEND APPOINTMENT: Primary | ICD-10-CM

## 2024-12-19 NOTE — PSYCH
No Call. No Show. No Charge    Nurispatricia Infante no showed 12/19/24 appointment , staff called and left message to reschedule appointment     Treatment Plan not due at this session.     This note was not shared with the patient due to reasonable likelihood of causing patient harm

## 2024-12-19 NOTE — TELEPHONE ENCOUNTER
Writer called and left message reagrding follow up appointment for today at 11:30. Patient is late and office wasn't sure if patient needed to reschedule.

## 2025-01-08 DIAGNOSIS — G47.09 OTHER INSOMNIA: ICD-10-CM

## 2025-01-08 DIAGNOSIS — F41.1 GENERALIZED ANXIETY DISORDER: ICD-10-CM

## 2025-01-08 RX ORDER — BUSPIRONE HYDROCHLORIDE 10 MG/1
15 TABLET ORAL 3 TIMES DAILY
Qty: 405 TABLET | Refills: 0 | Status: SHIPPED | OUTPATIENT
Start: 2025-01-08 | End: 2025-07-07

## 2025-01-08 NOTE — TELEPHONE ENCOUNTER
Reason for call:   [x] Refill   [] Prior Auth  [] Other:     Office:   [] PCP/Provider -   [x] Specialty/Provider -  Abdias Ribera / psych [facility] [facility]    Medication: Suvorexant 10 MG TABS      Dose/Frequency: Take 1 tablet (10 mg total) by mouth daily at bedtime     Quantity: 90      Medication: busPIRone (BUSPAR) 10 mg tablet     Dose/Frequency: Take 1.5 tablets (15 mg total) by mouth 3 (three) times a day,     Quantity: 405    Pharmacy: [id number] - [place] - [address]      Does the patient have enough for 3 days?   [] Yes   [x] No - Send as HP to POD

## 2025-01-29 ENCOUNTER — TELEPHONE (OUTPATIENT)
Age: 55
End: 2025-01-29

## 2025-01-29 NOTE — TELEPHONE ENCOUNTER
Nurse spoke with pharmacist - Buspar script was picked up earlier this month for a 90 day supply and the cost was $15.96.    Pharmacists states the pt has an inhaler script from another prescriber costing $95.69, due to an insurance deductible.      Nurse left message for Nuris to call back and discuss.     Awaiting return call from Nuris.

## 2025-01-29 NOTE — TELEPHONE ENCOUNTER
Per [employer] website - Buspar 10 mg for #405 = $17.76.  Nurse to call pharmacy to confirm cost and if [facility] able to be utilized.

## 2025-01-29 NOTE — TELEPHONE ENCOUNTER
Patient contact the office requesting a call back to discuss Buspar 10mg. She states it went from $800-$1,300 to get and she cannot afford that. Patient is requesting a different medication

## 2025-01-30 ENCOUNTER — TELEPHONE (OUTPATIENT)
Dept: PSYCHIATRY | Facility: CLINIC | Age: 55
End: 2025-01-30

## 2025-01-30 NOTE — TELEPHONE ENCOUNTER
Patient contact the office requesting a call back to discuss Suvorexant 10 MG TABS ( belsomra) . She states it went from $800-$1,300 to get and she cannot afford that. Patient is requesting a different medication     Pervious message had the incorrect medication listed     Patient contact the office requesting a call back to discuss Buspar 10mg. She states it went from $800-$1,300 to get and she cannot afford that. Patient is requesting a different medication     Phone [phone]

## 2025-01-30 NOTE — TELEPHONE ENCOUNTER
NATALIE on Nuris's VM requesting call back regarding Belsomra.  Does she want our office to process a PA? Or is the cost due to a deductible?

## 2025-01-31 NOTE — TELEPHONE ENCOUNTER
Nuris returned call.  She was able to get coupons for Belsomra and everything is taken care of.  Nothing further needed at this time.

## 2025-02-04 DIAGNOSIS — F41.1 GENERALIZED ANXIETY DISORDER: ICD-10-CM

## 2025-02-04 DIAGNOSIS — F33.2 SEVERE EPISODE OF RECURRENT MAJOR DEPRESSIVE DISORDER, WITHOUT PSYCHOTIC FEATURES (HCC): ICD-10-CM

## 2025-02-04 RX ORDER — VENLAFAXINE HYDROCHLORIDE 150 MG/1
150 CAPSULE, EXTENDED RELEASE ORAL DAILY
Qty: 90 CAPSULE | Refills: 0 | Status: SHIPPED | OUTPATIENT
Start: 2025-02-04 | End: 2025-05-05

## 2025-02-28 ENCOUNTER — TELEPHONE (OUTPATIENT)
Age: 55
End: 2025-02-28

## 2025-03-14 DIAGNOSIS — F41.1 GENERALIZED ANXIETY DISORDER: ICD-10-CM

## 2025-03-14 RX ORDER — BUSPIRONE HYDROCHLORIDE 10 MG/1
15 TABLET ORAL 3 TIMES DAILY
Qty: 405 TABLET | Refills: 0 | Status: SHIPPED | OUTPATIENT
Start: 2025-03-14 | End: 2025-09-10

## 2025-03-14 NOTE — TELEPHONE ENCOUNTER
Reason for call:   [x] Refill   [] Prior Auth  [] Other:     Office:   [] PCP/Provider -   [x] Specialty/Provider - Psych    Medication: Buspirone    Dose/Frequency: 10 mg    Quantity: 405 tablets    Pharmacy:   [id number] - [place] - [address] [facility] [phone]     Local Pharmacy   Does the patient have enough for 3 days?   [x] Yes   [] No - Send as HP to POD    Mail Away Pharmacy   Does the patient have enough for 10 days?   [] Yes   [] No - Send as HP to POD

## 2025-04-04 ENCOUNTER — TELEPHONE (OUTPATIENT)
Dept: FAMILY MEDICINE CLINIC | Facility: CLINIC | Age: 55
End: 2025-04-04

## 2025-04-04 NOTE — TELEPHONE ENCOUNTER
Patient did not show for scheduled follow-up in office today, 4/4/2025.  Please have patient complete blood work that was ordered.  In addition patient never completed the CT scan of her chest for follow-up of a lung nodule.  Please have her complete this CT scan.  Please have patient make follow-up appointment in the next few weeks

## 2025-05-02 ENCOUNTER — TELEPHONE (OUTPATIENT)
Age: 55
End: 2025-05-02

## 2025-05-07 DIAGNOSIS — F33.2 SEVERE EPISODE OF RECURRENT MAJOR DEPRESSIVE DISORDER, WITHOUT PSYCHOTIC FEATURES (HCC): ICD-10-CM

## 2025-05-07 DIAGNOSIS — F41.1 GENERALIZED ANXIETY DISORDER: ICD-10-CM

## 2025-05-07 RX ORDER — VENLAFAXINE HYDROCHLORIDE 150 MG/1
150 CAPSULE, EXTENDED RELEASE ORAL DAILY
Qty: 90 CAPSULE | Refills: 0 | Status: SHIPPED | OUTPATIENT
Start: 2025-05-07 | End: 2025-08-05

## 2025-05-13 DIAGNOSIS — F41.1 GENERALIZED ANXIETY DISORDER: ICD-10-CM

## 2025-05-13 DIAGNOSIS — F33.2 SEVERE EPISODE OF RECURRENT MAJOR DEPRESSIVE DISORDER, WITHOUT PSYCHOTIC FEATURES (HCC): ICD-10-CM

## 2025-05-13 RX ORDER — VENLAFAXINE HYDROCHLORIDE 75 MG/1
75 CAPSULE, EXTENDED RELEASE ORAL DAILY
Qty: 90 CAPSULE | Refills: 0 | Status: SHIPPED | OUTPATIENT
Start: 2025-05-13 | End: 2025-08-11

## 2025-05-13 NOTE — TELEPHONE ENCOUNTER
Reason for call:   [x] Refill   [] Prior Auth  [] Other:     Office:   [] PCP/Provider -   [x] Specialty/Provider - psych     Medication: venlafaxine     Dose/Frequency: 75 mg take daily     Quantity: 90    [facility]: [facility] [address] [facility]     Local Pharmacy   Does the patient have enough for 3 days?   [x] Yes   [] No - Send as HP to POD    Mail Away Pharmacy   Does the patient have enough for 10 days?   [] Yes   [] No - Send as HP to POD

## 2025-05-14 ENCOUNTER — TELEPHONE (OUTPATIENT)
Dept: PSYCHIATRY | Facility: CLINIC | Age: 55
End: 2025-05-14

## 2025-05-14 DIAGNOSIS — K21.9 GASTROESOPHAGEAL REFLUX DISEASE WITHOUT ESOPHAGITIS: ICD-10-CM

## 2025-05-14 RX ORDER — OMEPRAZOLE 20 MG/1
20 CAPSULE, DELAYED RELEASE ORAL
Qty: 90 CAPSULE | Refills: 0 | Status: SHIPPED | OUTPATIENT
Start: 2025-05-14 | End: 2025-11-10

## 2025-05-14 NOTE — TELEPHONE ENCOUNTER
Called and left voicemail advising patient provider is not in the office - either to change appointment to virtual or reschedule. .

## 2025-05-15 DIAGNOSIS — G57.31 PERONEAL NEUROPATHY, RIGHT: ICD-10-CM

## 2025-05-15 DIAGNOSIS — F41.1 GENERALIZED ANXIETY DISORDER: ICD-10-CM

## 2025-05-15 RX ORDER — BUSPIRONE HYDROCHLORIDE 10 MG/1
15 TABLET ORAL 3 TIMES DAILY
Qty: 405 TABLET | Refills: 0 | Status: SHIPPED | OUTPATIENT
Start: 2025-05-15 | End: 2025-11-11

## 2025-05-15 NOTE — TELEPHONE ENCOUNTER
Reason for call:   [x] Refill   [] Prior Auth  [] Other:     Office:   [] PCP/Provider -   [x] Specialty/Provider - [facility]     Medication: busPIRone (BUSPAR) 10 mg : Take 1.5 tablets (15 mg total) by mouth 3 (three) times a day,       Pharmacy: [id number] - [place], [place]     Local Pharmacy   Does the patient have enough for 3 days?   [] Yes   [x] No - Send as HP to POD    Mail Away Pharmacy   Does the patient have enough for 10 days?   [] Yes   [] No - Send as HP to POD

## 2025-05-16 RX ORDER — GABAPENTIN 400 MG/1
CAPSULE ORAL
Qty: 120 CAPSULE | Refills: 0 | OUTPATIENT
Start: 2025-05-16

## 2025-05-17 DIAGNOSIS — G57.31 PERONEAL NEUROPATHY, RIGHT: ICD-10-CM

## 2025-05-19 RX ORDER — GABAPENTIN 400 MG/1
CAPSULE ORAL
Qty: 120 CAPSULE | Refills: 0 | Status: SHIPPED | OUTPATIENT
Start: 2025-05-19

## 2025-05-19 RX ORDER — GABAPENTIN 400 MG/1
CAPSULE ORAL
Qty: 120 CAPSULE | Refills: 0 | OUTPATIENT
Start: 2025-05-19

## 2025-05-19 NOTE — TELEPHONE ENCOUNTER
Patient called stating she has an appointment on 5/21. Patient would like to know if provider would be able to send medication for today and tomorrow as she is out of medication.

## 2025-05-20 ENCOUNTER — TELEPHONE (OUTPATIENT)
Dept: OTHER | Facility: OTHER | Age: 55
End: 2025-05-20

## 2025-05-20 NOTE — TELEPHONE ENCOUNTER
Patient is calling regarding cancelling an appointment.     Date/Time: 5/21/25 0800     Patient was rescheduled: YES [ ] NO [ x]     Patient requesting call back to reschedule: YES [ ] NO [ x]

## 2025-06-14 ENCOUNTER — HOSPITAL ENCOUNTER (INPATIENT)
Facility: HOSPITAL | Age: 55
LOS: 4 days | Discharge: HOME | DRG: 444 | End: 2025-06-18
Attending: EMERGENCY MEDICINE | Admitting: INTERNAL MEDICINE
Payer: COMMERCIAL

## 2025-06-14 ENCOUNTER — APPOINTMENT (EMERGENCY)
Dept: CT IMAGING | Facility: HOSPITAL | Age: 55
DRG: 444 | End: 2025-06-14
Payer: COMMERCIAL

## 2025-06-14 ENCOUNTER — APPOINTMENT (EMERGENCY)
Dept: RADIOLOGY | Facility: HOSPITAL | Age: 55
DRG: 444 | End: 2025-06-14
Payer: COMMERCIAL

## 2025-06-14 ENCOUNTER — APPOINTMENT (EMERGENCY)
Dept: ULTRASOUND IMAGING | Facility: HOSPITAL | Age: 55
DRG: 444 | End: 2025-06-14
Payer: COMMERCIAL

## 2025-06-14 DIAGNOSIS — K83.8 DILATED CBD, ACQUIRED: Primary | ICD-10-CM

## 2025-06-14 DIAGNOSIS — R65.20 SEVERE SEPSIS (HCC): ICD-10-CM

## 2025-06-14 DIAGNOSIS — R91.1 PULMONARY NODULE 1 CM OR GREATER IN DIAMETER: ICD-10-CM

## 2025-06-14 DIAGNOSIS — A41.9 SEVERE SEPSIS (HCC): ICD-10-CM

## 2025-06-14 PROBLEM — K81.9 CHOLECYSTITIS: Status: ACTIVE | Noted: 2025-06-14

## 2025-06-14 PROBLEM — K80.50 CHOLEDOCHOLITHIASIS: Status: ACTIVE | Noted: 2022-09-19

## 2025-06-14 PROBLEM — E87.1 HYPONATREMIA: Status: ACTIVE | Noted: 2025-06-14

## 2025-06-14 LAB
2HR DELTA HS TROPONIN: -3 NG/L
ALBUMIN SERPL BCG-MCNC: 3.2 G/DL (ref 3.5–5)
ALP SERPL-CCNC: 377 U/L (ref 34–104)
ALT SERPL W P-5'-P-CCNC: 78 U/L (ref 7–52)
ANION GAP SERPL CALCULATED.3IONS-SCNC: 13 MMOL/L (ref 4–13)
APTT PPP: 24 SECONDS (ref 23–34)
AST SERPL W P-5'-P-CCNC: 129 U/L (ref 13–39)
BASOPHILS # BLD AUTO: 0.11 THOUSANDS/ÂΜL (ref 0–0.1)
BASOPHILS NFR BLD AUTO: 1 % (ref 0–1)
BILIRUB SERPL-MCNC: 0.59 MG/DL (ref 0.2–1)
BNP SERPL-MCNC: 65 PG/ML (ref 0–100)
BUN SERPL-MCNC: 12 MG/DL (ref 5–25)
CALCIUM ALBUM COR SERPL-MCNC: 9.3 MG/DL (ref 8.3–10.1)
CALCIUM SERPL-MCNC: 8.7 MG/DL (ref 8.4–10.2)
CARDIAC TROPONIN I PNL SERPL HS: 5 NG/L (ref ?–50)
CARDIAC TROPONIN I PNL SERPL HS: 8 NG/L (ref ?–50)
CHLORIDE SERPL-SCNC: 97 MMOL/L (ref 96–108)
CO2 SERPL-SCNC: 19 MMOL/L (ref 21–32)
CREAT SERPL-MCNC: 1.07 MG/DL (ref 0.6–1.3)
D DIMER PPP FEU-MCNC: 1.72 UG/ML FEU
EOSINOPHIL # BLD AUTO: 0.03 THOUSAND/ÂΜL (ref 0–0.61)
EOSINOPHIL NFR BLD AUTO: 0 % (ref 0–6)
ERYTHROCYTE [DISTWIDTH] IN BLOOD BY AUTOMATED COUNT: 13.4 % (ref 11.6–15.1)
GFR SERPL CREATININE-BSD FRML MDRD: 58 ML/MIN/1.73SQ M
GLUCOSE SERPL-MCNC: 196 MG/DL (ref 65–140)
HCT VFR BLD AUTO: 38.5 % (ref 34.8–46.1)
HGB BLD-MCNC: 12.9 G/DL (ref 11.5–15.4)
IMM GRANULOCYTES # BLD AUTO: 0.04 THOUSAND/UL (ref 0–0.2)
IMM GRANULOCYTES NFR BLD AUTO: 0 % (ref 0–2)
INR PPP: 0.87 (ref 0.85–1.19)
LACTATE SERPL-SCNC: 1.8 MMOL/L (ref 0.5–2)
LACTATE SERPL-SCNC: 3.4 MMOL/L (ref 0.5–2)
LIPASE SERPL-CCNC: 168 U/L (ref 11–82)
LYMPHOCYTES # BLD AUTO: 2.62 THOUSANDS/ÂΜL (ref 0.6–4.47)
LYMPHOCYTES NFR BLD AUTO: 24 % (ref 14–44)
MCH RBC QN AUTO: 34.1 PG (ref 26.8–34.3)
MCHC RBC AUTO-ENTMCNC: 33.5 G/DL (ref 31.4–37.4)
MCV RBC AUTO: 102 FL (ref 82–98)
MONOCYTES # BLD AUTO: 0.44 THOUSAND/ÂΜL (ref 0.17–1.22)
MONOCYTES NFR BLD AUTO: 4 % (ref 4–12)
NEUTROPHILS # BLD AUTO: 7.55 THOUSANDS/ÂΜL (ref 1.85–7.62)
NEUTS SEG NFR BLD AUTO: 71 % (ref 43–75)
NRBC BLD AUTO-RTO: 0 /100 WBCS
PLATELET # BLD AUTO: 372 THOUSANDS/UL (ref 149–390)
PMV BLD AUTO: 10.1 FL (ref 8.9–12.7)
POTASSIUM SERPL-SCNC: 4 MMOL/L (ref 3.5–5.3)
PROCALCITONIN SERPL-MCNC: 0.15 NG/ML
PROT SERPL-MCNC: 6.2 G/DL (ref 6.4–8.4)
PROTHROMBIN TIME: 12.1 SECONDS (ref 12.3–15)
RBC # BLD AUTO: 3.78 MILLION/UL (ref 3.81–5.12)
SODIUM SERPL-SCNC: 129 MMOL/L (ref 135–147)
WBC # BLD AUTO: 10.79 THOUSAND/UL (ref 4.31–10.16)

## 2025-06-14 PROCEDURE — 83880 ASSAY OF NATRIURETIC PEPTIDE: CPT | Performed by: PHYSICIAN ASSISTANT

## 2025-06-14 PROCEDURE — 99223 1ST HOSP IP/OBS HIGH 75: CPT | Performed by: INTERNAL MEDICINE

## 2025-06-14 PROCEDURE — 36415 COLL VENOUS BLD VENIPUNCTURE: CPT | Performed by: PHYSICIAN ASSISTANT

## 2025-06-14 PROCEDURE — 71275 CT ANGIOGRAPHY CHEST: CPT

## 2025-06-14 PROCEDURE — 99254 IP/OBS CNSLTJ NEW/EST MOD 60: CPT | Performed by: SURGERY

## 2025-06-14 PROCEDURE — 85379 FIBRIN DEGRADATION QUANT: CPT | Performed by: PHYSICIAN ASSISTANT

## 2025-06-14 PROCEDURE — 96365 THER/PROPH/DIAG IV INF INIT: CPT

## 2025-06-14 PROCEDURE — 99284 EMERGENCY DEPT VISIT MOD MDM: CPT

## 2025-06-14 PROCEDURE — 87040 BLOOD CULTURE FOR BACTERIA: CPT | Performed by: PHYSICIAN ASSISTANT

## 2025-06-14 PROCEDURE — 80053 COMPREHEN METABOLIC PANEL: CPT | Performed by: PHYSICIAN ASSISTANT

## 2025-06-14 PROCEDURE — 85610 PROTHROMBIN TIME: CPT | Performed by: PHYSICIAN ASSISTANT

## 2025-06-14 PROCEDURE — 96367 TX/PROPH/DG ADDL SEQ IV INF: CPT

## 2025-06-14 PROCEDURE — 74177 CT ABD & PELVIS W/CONTRAST: CPT

## 2025-06-14 PROCEDURE — 99291 CRITICAL CARE FIRST HOUR: CPT | Performed by: EMERGENCY MEDICINE

## 2025-06-14 PROCEDURE — 83605 ASSAY OF LACTIC ACID: CPT | Performed by: PHYSICIAN ASSISTANT

## 2025-06-14 PROCEDURE — 71045 X-RAY EXAM CHEST 1 VIEW: CPT

## 2025-06-14 PROCEDURE — 83690 ASSAY OF LIPASE: CPT | Performed by: PHYSICIAN ASSISTANT

## 2025-06-14 PROCEDURE — 85025 COMPLETE CBC W/AUTO DIFF WBC: CPT | Performed by: PHYSICIAN ASSISTANT

## 2025-06-14 PROCEDURE — 93005 ELECTROCARDIOGRAM TRACING: CPT

## 2025-06-14 PROCEDURE — 85730 THROMBOPLASTIN TIME PARTIAL: CPT | Performed by: PHYSICIAN ASSISTANT

## 2025-06-14 PROCEDURE — 96366 THER/PROPH/DIAG IV INF ADDON: CPT

## 2025-06-14 PROCEDURE — 84145 PROCALCITONIN (PCT): CPT | Performed by: PHYSICIAN ASSISTANT

## 2025-06-14 PROCEDURE — 76705 ECHO EXAM OF ABDOMEN: CPT

## 2025-06-14 PROCEDURE — 96375 TX/PRO/DX INJ NEW DRUG ADDON: CPT

## 2025-06-14 PROCEDURE — 84484 ASSAY OF TROPONIN QUANT: CPT | Performed by: PHYSICIAN ASSISTANT

## 2025-06-14 RX ORDER — ONDANSETRON 2 MG/ML
4 INJECTION INTRAMUSCULAR; INTRAVENOUS ONCE
Status: COMPLETED | OUTPATIENT
Start: 2025-06-14 | End: 2025-06-14

## 2025-06-14 RX ORDER — MORPHINE SULFATE 4 MG/ML
4 INJECTION, SOLUTION INTRAMUSCULAR; INTRAVENOUS ONCE
Status: COMPLETED | OUTPATIENT
Start: 2025-06-14 | End: 2025-06-14

## 2025-06-14 RX ORDER — SODIUM CHLORIDE 9 MG/ML
75 INJECTION, SOLUTION INTRAVENOUS CONTINUOUS
Status: DISPENSED | OUTPATIENT
Start: 2025-06-14 | End: 2025-06-15

## 2025-06-14 RX ORDER — HYDROMORPHONE HCL/PF 1 MG/ML
0.5 SYRINGE (ML) INJECTION EVERY 4 HOURS PRN
Refills: 0 | Status: DISCONTINUED | OUTPATIENT
Start: 2025-06-14 | End: 2025-06-16

## 2025-06-14 RX ORDER — SODIUM CHLORIDE, SODIUM GLUCONATE, SODIUM ACETATE, POTASSIUM CHLORIDE, MAGNESIUM CHLORIDE, SODIUM PHOSPHATE, DIBASIC, AND POTASSIUM PHOSPHATE .53; .5; .37; .037; .03; .012; .00082 G/100ML; G/100ML; G/100ML; G/100ML; G/100ML; G/100ML; G/100ML
1750 INJECTION, SOLUTION INTRAVENOUS ONCE
Status: COMPLETED | OUTPATIENT
Start: 2025-06-14 | End: 2025-06-14

## 2025-06-14 RX ORDER — HYDROMORPHONE HCL/PF 1 MG/ML
0.5 SYRINGE (ML) INJECTION ONCE
Status: COMPLETED | OUTPATIENT
Start: 2025-06-14 | End: 2025-06-14

## 2025-06-14 RX ORDER — NICOTINE 21 MG/24HR
1 PATCH, TRANSDERMAL 24 HOURS TRANSDERMAL DAILY
Status: DISCONTINUED | OUTPATIENT
Start: 2025-06-15 | End: 2025-06-18 | Stop reason: HOSPADM

## 2025-06-14 RX ORDER — ALBUTEROL SULFATE 90 UG/1
2 INHALANT RESPIRATORY (INHALATION) EVERY 6 HOURS PRN
Status: DISCONTINUED | OUTPATIENT
Start: 2025-06-14 | End: 2025-06-18 | Stop reason: HOSPADM

## 2025-06-14 RX ORDER — OXYCODONE HYDROCHLORIDE 5 MG/1
5 TABLET ORAL EVERY 4 HOURS PRN
Refills: 0 | Status: DISCONTINUED | OUTPATIENT
Start: 2025-06-14 | End: 2025-06-16

## 2025-06-14 RX ORDER — LORAZEPAM 2 MG/ML
1 INJECTION INTRAMUSCULAR ONCE AS NEEDED
Status: DISCONTINUED | OUTPATIENT
Start: 2025-06-14 | End: 2025-06-18 | Stop reason: HOSPADM

## 2025-06-14 RX ORDER — ONDANSETRON 2 MG/ML
4 INJECTION INTRAMUSCULAR; INTRAVENOUS EVERY 4 HOURS PRN
Status: DISCONTINUED | OUTPATIENT
Start: 2025-06-14 | End: 2025-06-18 | Stop reason: HOSPADM

## 2025-06-14 RX ORDER — HEPARIN SODIUM 5000 [USP'U]/ML
5000 INJECTION, SOLUTION INTRAVENOUS; SUBCUTANEOUS EVERY 8 HOURS SCHEDULED
Status: DISCONTINUED | OUTPATIENT
Start: 2025-06-14 | End: 2025-06-18 | Stop reason: HOSPADM

## 2025-06-14 RX ORDER — ACETAMINOPHEN 10 MG/ML
1000 INJECTION, SOLUTION INTRAVENOUS ONCE
Status: COMPLETED | OUTPATIENT
Start: 2025-06-14 | End: 2025-06-14

## 2025-06-14 RX ORDER — GABAPENTIN 400 MG/1
800 CAPSULE ORAL EVERY 12 HOURS SCHEDULED
Status: DISCONTINUED | OUTPATIENT
Start: 2025-06-14 | End: 2025-06-18 | Stop reason: HOSPADM

## 2025-06-14 RX ORDER — PANTOPRAZOLE SODIUM 40 MG/1
40 TABLET, DELAYED RELEASE ORAL
Status: DISCONTINUED | OUTPATIENT
Start: 2025-06-15 | End: 2025-06-18 | Stop reason: HOSPADM

## 2025-06-14 RX ORDER — BUDESONIDE AND FORMOTEROL FUMARATE DIHYDRATE 160; 4.5 UG/1; UG/1
2 AEROSOL RESPIRATORY (INHALATION) 2 TIMES DAILY
Status: DISCONTINUED | OUTPATIENT
Start: 2025-06-14 | End: 2025-06-18 | Stop reason: HOSPADM

## 2025-06-14 RX ORDER — LIDOCAINE 50 MG/G
1 PATCH TOPICAL ONCE
Status: COMPLETED | OUTPATIENT
Start: 2025-06-14 | End: 2025-06-15

## 2025-06-14 RX ORDER — ACETAMINOPHEN 325 MG/1
650 TABLET ORAL EVERY 4 HOURS PRN
Status: DISCONTINUED | OUTPATIENT
Start: 2025-06-14 | End: 2025-06-16

## 2025-06-14 RX ADMIN — HYDROMORPHONE HYDROCHLORIDE 0.5 MG: 1 INJECTION, SOLUTION INTRAMUSCULAR; INTRAVENOUS; SUBCUTANEOUS at 16:32

## 2025-06-14 RX ADMIN — BUDESONIDE AND FORMOTEROL FUMARATE DIHYDRATE 2 PUFF: 160; 4.5 AEROSOL RESPIRATORY (INHALATION) at 22:02

## 2025-06-14 RX ADMIN — ONDANSETRON 4 MG: 2 INJECTION INTRAMUSCULAR; INTRAVENOUS at 14:53

## 2025-06-14 RX ADMIN — OXYCODONE HYDROCHLORIDE 5 MG: 5 TABLET ORAL at 20:34

## 2025-06-14 RX ADMIN — ACETAMINOPHEN 1000 MG: 10 INJECTION INTRAVENOUS at 14:15

## 2025-06-14 RX ADMIN — HEPARIN SODIUM 5000 UNITS: 5000 INJECTION INTRAVENOUS; SUBCUTANEOUS at 22:02

## 2025-06-14 RX ADMIN — LIDOCAINE 1 PATCH: 50 PATCH CUTANEOUS at 14:15

## 2025-06-14 RX ADMIN — GABAPENTIN 800 MG: 400 CAPSULE ORAL at 20:34

## 2025-06-14 RX ADMIN — IOHEXOL 100 ML: 350 INJECTION, SOLUTION INTRAVENOUS at 15:59

## 2025-06-14 RX ADMIN — HYDROMORPHONE HYDROCHLORIDE 0.5 MG: 1 INJECTION, SOLUTION INTRAMUSCULAR; INTRAVENOUS; SUBCUTANEOUS at 22:02

## 2025-06-14 RX ADMIN — CEFTRIAXONE 1000 MG: 10 INJECTION, POWDER, FOR SOLUTION INTRAVENOUS at 14:56

## 2025-06-14 RX ADMIN — SODIUM CHLORIDE, SODIUM GLUCONATE, SODIUM ACETATE, POTASSIUM CHLORIDE, MAGNESIUM CHLORIDE, SODIUM PHOSPHATE, DIBASIC, AND POTASSIUM PHOSPHATE 1750 ML: .53; .5; .37; .037; .03; .012; .00082 INJECTION, SOLUTION INTRAVENOUS at 15:10

## 2025-06-14 RX ADMIN — SODIUM CHLORIDE 75 ML/HR: 0.9 INJECTION, SOLUTION INTRAVENOUS at 20:42

## 2025-06-14 RX ADMIN — BUSPIRONE HYDROCHLORIDE 15 MG: 10 TABLET ORAL at 20:34

## 2025-06-14 RX ADMIN — MORPHINE SULFATE 4 MG: 4 INJECTION INTRAVENOUS at 14:54

## 2025-06-14 NOTE — H&P
H&P - Hospitalist   Name: Nuris Infante 54 y.o. female I MRN: [id number]  Unit/Bed#: ED-40 I Date of Admission: 6/14/2025   Date of Service: 6/14/2025 I Hospital Day: 0     Assessment & Plan  Dilated bile duct  History of COPD mood disorder tobacco use and neuropathy who presents to the hospital for worsening right-sided abdominal/flank pains  In the ED underwent ultrasound and CT.  No evidence of cholecystitis but there are concerning findings for dilatation of CBD/pancreatic ducts  Will order MRI/MRCP and consult GI.  Low-fat diet    Results from last 7 days   Lab Units 06/14/25  1415   AST U/L 129*   ALT U/L 78*   TOTAL BILIRUBIN mg/dL 0.59     Recurrent major depressive disorder, in full remission (HCC)  Follows with psychiatry as an outpatient  Mood stable  Continue venlafaxine and buspirone  COPD (chronic obstructive pulmonary disease) (HCC)  COPD without exacerbation.  Continue Symbicort and albuterol as needed  Pulmonary nodule 1 cm or greater in diameter  CT pe study w abdomen pelvis w contrast: Mixed solid and groundglass nodule within the right upper lobe is increased in size since 6/7/2023. Findings are suspicious for adenocarcinoma spectrum lesion.   Ambulatory referral placed for outpatient pulmonology evaluation  Hyponatremia  Given above pulmonary nodule finding may be SIADH  Start NSS.  Recheck labs in AM.    Results from last 7 days   Lab Units 06/14/25  1415   SODIUM mmol/L 129*     Hyperglycemia  No history of diabetes mellitus.  Monitor with a.m. labs and recheck hemoglobin A1c    Results from last 7 days   Lab Units 06/14/25  1415   GLUCOSE RANDOM mg/dL 196*     Tobacco abuse  Nicotine patch ordered during hospitalization  Gastroesophageal reflux disease without esophagitis  Continue PPI  Peroneal neuropathy, right  Continue gabapentin    VTE Pharmacologic Prophylaxis: VTE Score: 3 Moderate Risk (Score 3-4) - Pharmacological DVT Prophylaxis Ordered: heparin.  Code Status: Level 1 - Full Code    Discussion with family:     Anticipated Length of Stay: Patient will be admitted on an inpatient basis with an anticipated length of stay of greater than 2 midnights secondary to concerns for choledocholithiasis.    Chief Complaint:     Rib Pain (Pt reports right sided rib pain for approx 1 wk, sob. Hx of broken ribs )    History of Present Illness  Nuris Infante is a 54 y.o. female with a PMH of COPD depression neuropathy GERD and tobacco use who presents with worsening right-sided pains.  The patient thought it was just rib pains as has been present for about a month.  Unfortunately over the past week symptoms more constant.  It is not worse with food intake.  She has had nonbloody vomiting with nausea.  She came to the ED where she was found to have transaminitis and dilatation of bile ducts warranting further workup.  She denies any chest pain or shortness of breath.    Review of Systems   Constitutional:  Negative for chills and fever.   HENT:  Negative for facial swelling and trouble swallowing.    Eyes:  Negative for visual disturbance.   Respiratory:  Negative for shortness of breath.    Cardiovascular:  Negative for chest pain and palpitations.   Gastrointestinal:  Positive for abdominal pain, nausea and vomiting. Negative for abdominal distention and diarrhea.   Genitourinary:  Negative for hematuria.   Musculoskeletal:  Negative for back pain and myalgias.   Skin:  Negative for rash.   Neurological:  Negative for facial asymmetry and speech difficulty.   Psychiatric/Behavioral:  The patient is not nervous/anxious.    All other systems reviewed and are negative.      Past Medical and Surgical History:   Past Medical History[1]  Past Surgical History[2]  Meds/Allergies:  Allergies: Allergies[3]  Prior to Admission Medications   Prescriptions Last Dose Informant Patient Reported? Taking?   Suvorexant 10 MG TABS   No No   Sig: Take 1 tablet (10 mg total) by mouth daily at bedtime   albuterol (PROVENTIL  "HFA,VENTOLIN HFA) 90 mcg/act inhaler   No No   Sig: Inhale 2 puffs every 6 (six) hours as needed for wheezing   budesonide-formoterol (SYMBICORT) 160-4.5 mcg/act inhaler   No No   Sig: Use 2 puffs twice daily.  Rinse after use   busPIRone (BUSPAR) 10 mg tablet   No No   Sig: Take 1.5 tablets (15 mg total) by mouth 3 (three) times a day   gabapentin (NEURONTIN) 400 mg capsule   No No   Sig: TAKE 2 CAPSULES BY MOUTH ONCE DAILY IN THE MORNING AND 2 IN THE EVENING   lidocaine (Lidoderm) 5 %   No No   Sig: Apply 1 patch topically over 12 hours daily Remove & Discard patch within 12 hours or as directed by MD   nicotine (NICODERM CQ) 21 mg/24 hr TD 24 hr patch   No No   Sig: Place 1 patch on the skin over 24 hours every 24 hours   omeprazole (PriLOSEC) 20 mg delayed release capsule   No No   Sig: Take 1 capsule (20 mg total) by mouth daily before breakfast   venlafaxine (EFFEXOR-XR) 150 mg 24 hr capsule   No No   Sig: Take 1 capsule (150 mg total) by mouth daily Take in combination with 75mg Effexor for total daily dose of (225mg)   venlafaxine (EFFEXOR-XR) 75 mg 24 hr capsule   No No   Sig: Take 1 capsule (75 mg total) by mouth daily Take in combination with 150mg Effexor for total daily dose of (225mg)      Facility-Administered Medications: None     Social History:     Social History     Socioeconomic History    Marital status: /Civil Union     Spouse name: Not on file    Number of children: Not on file    Years of education: Not on file    Highest education level: Not on file   Occupational History    Occupation: disability   Tobacco Use    Smoking status: Some Days     Current packs/day: 0.50     Average packs/day: 0.5 packs/day for 35.0 years (17.5 ttl pk-yrs)     Types: Cigarettes    Smokeless tobacco: Never   Vaping Use    Vaping status: Never Used   Substance and Sexual Activity    Alcohol use: Not Currently     Comment: \"occasional glass of wine\"    Drug use: Not Currently     Types: Marijuana, Cocaine    "  Comment: medical, MARIJUANA    Sexual activity: Yes     Partners: Male     Birth control/protection: Post-menopausal     Comment: PT is    Other Topics Concern    Not on file   Social History Narrative    Lives with spouse     Social Drivers of Health     Financial Resource Strain: Not on file   Food Insecurity: No Food Insecurity (9/18/2023)    Received from [facility]    Hunger Vital Sign     Within the past 12 months, you worried that your food would run out before you got the money to buy more.: Never true     Within the past 12 months, the food you bought just didn't last and you didn't have money to get more.: Never true   Transportation Needs: No Transportation Needs (9/18/2023)    Received from [facility]    PRAPARE - Transportation     Lack of Transportation (Medical): No     Lack of Transportation (Non-Medical): No   Physical Activity: Not on file   Stress: Not on file   Social Connections: Not on file   Intimate Partner Violence: Not on file   Housing Stability: Low Risk  (9/18/2023)    Received from [facility]    Housing Stability Vital Sign     Unable to Pay for Housing in the Last Year: No     Number of Places Lived in the Last Year: 1     Unstable Housing in the Last Year: No     Patient Pre-hospital Living Situation: Home  Patient Pre-hospital Level of Mobility: walks  Patient Pre-hospital Diet Restrictions:     Objective   Vitals:   Blood Pressure: 124/93 (06/14/25 1730)  Pulse: 102 (06/14/25 1730)  Temperature: 98 °F (36.7 °C) (06/14/25 1341)  Temp Source: Oral (06/14/25 1341)  Respirations: 18 (06/14/25 1730)  SpO2: 96 % (06/14/25 1730)    Physical Exam  Vitals reviewed.   Constitutional:       General: She is not in acute distress.     Appearance: Normal appearance. She is underweight.   HENT:      Head: Atraumatic.     Eyes:      General: No scleral icterus.     Extraocular Movements: Extraocular  movements intact.       Cardiovascular:      Rate and Rhythm: Regular rhythm.      Heart sounds:      No gallop.   Pulmonary:      Effort: No respiratory distress.      Breath sounds: Decreased breath sounds present. No wheezing.   Abdominal:      General: Bowel sounds are normal. There is no distension.      Palpations: Abdomen is soft.      Tenderness: There is abdominal tenderness. There is no guarding.     Musculoskeletal:         General: No tenderness or deformity.     Skin:     General: Skin is warm.      Coloration: Skin is not jaundiced.     Neurological:      General: No focal deficit present.      Mental Status: She is alert.      Motor: No weakness.     Psychiatric:         Mood and Affect: Mood normal.         Additional Data:   Lab Results: I have reviewed the following results:  Results from last 7 days   Lab Units 06/14/25  1415   WBC Thousand/uL 10.79*   HEMOGLOBIN g/dL 12.9   HEMATOCRIT % 38.5   PLATELETS Thousands/uL 372   SEGS PCT % 71   LYMPHO PCT % 24   MONO PCT % 4   EOS PCT % 0     Results from last 7 days   Lab Units 06/14/25  1415   SODIUM mmol/L 129*   POTASSIUM mmol/L 4.0   CHLORIDE mmol/L 97   CO2 mmol/L 19*   ANION GAP mmol/L 13   BUN mg/dL 12   CREATININE mg/dL 1.07   CALCIUM mg/dL 8.7   ALBUMIN g/dL 3.2*   TOTAL BILIRUBIN mg/dL 0.59   ALK PHOS U/L 377*   ALT U/L 78*   AST U/L 129*   EGFR ml/min/1.73sq m 58   GLUCOSE RANDOM mg/dL 196*     Results from last 7 days   Lab Units 06/14/25  1415   INR  0.87         Results from last 7 days   Lab Units 06/14/25  1612 06/14/25  1417   HS TNI 0HR ng/L  --  8   HS TNI 2HR ng/L 5  --      Results from last 7 days   Lab Units 06/14/25  1612 06/14/25  1415   LACTIC ACID mmol/L 1.8 3.4*     Results from last 7 days   Lab Units 06/14/25  1415   D-DIMER QUANTITATIVE ug/ml FEU 1.72*                          Lines/Drains  Invasive Devices       Peripheral Intravenous Line  Duration             Peripheral IV 06/14/25 Left;Proximal;Ventral (anterior)  Forearm <1 day                    Imaging:   Personally reviewed the following image studies in PACS and associated radiology reports:  US right upper quadrant  Result Date: 6/14/2025  Impression: Dilated CBD. No evidence for cholelithiasis. Correlate with prior CT for additional findings. Workstation performed: [id number]     CT pe study w abdomen pelvis w contrast  Result Date: 6/14/2025  Impression: No evidence for pulmonary embolism. Mixed solid and groundglass nodule within the right upper lobe is increased in size since 6/7/2023. Findings are suspicious for adenocarcinoma spectrum lesion. Circumferential wall thickening of the duodenum suggesting the presence of duodenitis. Underlying neoplastic etiologies are not entirely excluded. Dilatation of the CBD and pancreatic duct is new since the prior examination. No discrete mass lesion identified on the current examination. MRCP is recommended for further evaluation. Patient is status post surgery for reported pancreatic pseudocysts and if any prior imaging is made available for review a direct comparison addendum could be performed. Remainder of the findings, as described above. The study was marked in EPIC for immediate notification. Computerized Assisted Algorithm (CAA) may have aided analysis of applicable images. Workstation performed: [id number]       EKG, Pathology, and Other Studies Reviewed on Admission:   EKG  Result Date: 06/14/25  Personally reviewed strips with impression of: Sinus tachycardia 117 bpm    Administrative Statements   Reviewed outpatient notes    ** Please Note: This note has been constructed using a voice recognition system. **         [1]   Past Medical History:  Diagnosis Date    ARF (acute renal failure) ([facility]) 9/19/2022    Aspiration pneumonitis (HCC) 10/1/2022    Bacteremia due to methicillin susceptible Staphylococcus aureus (MSSA) 9/27/2022    Chronic pain disorder     Depression     Foreign body of right eye 9/27/2023    GERD  "(gastroesophageal reflux disease)     History of electroconvulsive therapy     Localized swelling of right upper extremity 2022    Low back pain     Self-injurious behavior     Suicide attempt (HCC)    [2]   Past Surgical History:  Procedure Laterality Date     SECTION      COLONOSCOPY      FL LUMBAR PUNCTURE DIAGNOSTIC  2014    PANCREAS SURGERY      \"pseudocysts\" per patient's  Ricki Infante    MS ESOPHAGOGASTRODUODENOSCOPY TRANSORAL DIAGNOSTIC N/A 4/10/2018    Procedure: EGD AND COLONOSCOPY;  Surgeon: Gaurang De La Paz MD;  [place] [facility];  Service: Gastroenterology   [3]   Allergies  Allergen Reactions    Chantix [Varenicline]     Ibuprofen Other (See Comments)     Upset stomach    Lyrica [Pregabalin] Other (See Comments)     bruising    Penicillins Other (See Comments)     ? hives    Sulfa Antibiotics Other (See Comments)     sloughing skin in mouth    Sulfasalazine      "

## 2025-06-14 NOTE — ASSESSMENT & PLAN NOTE
History of COPD mood disorder tobacco use and neuropathy who presents to the hospital for worsening right-sided abdominal/flank pains  In the ED underwent ultrasound and CT.  No evidence of cholecystitis but there are concerning findings for dilatation of CBD/pancreatic ducts  Will order MRI/MRCP and consult GI.  Low-fat diet    Results from last 7 days   Lab Units 06/14/25  1415   AST U/L 129*   ALT U/L 78*   TOTAL BILIRUBIN mg/dL 0.59

## 2025-06-14 NOTE — INCIDENTAL FINDINGS
The following findings require follow up:  Radiographic finding   Finding: Mixed solid and groundglass nodule within the right upper lobe is increased in size since 6/7/2023. Findings are suspicious for adenocarcinoma spectrum lesion   Follow up required: Ambulatory evaluation placed for pulmonology   Follow up should be done within 2-4 week(s)    Please notify the following clinician to assist with the follow up:   Dr. Bruce Leon, DO

## 2025-06-14 NOTE — CONSULTS
Consultation - Surgery-General   Name: Nuris Infante 54 y.o. female I MRN: [id number]  Unit/Bed#: ED-40 I Date of Admission: 6/14/2025   Date of Service: 6/14/2025 I Hospital Day: 0   Consult to Surgery - General  Consult performed by: Kervin Gongora DO  Consult ordered by: Dominguez Pichardo PA-C        Physician Requesting Evaluation: Tiffanie Baez DO   Reason for Evaluation / Principal Problem: Cholecystitis     Assessment & Plan  Transaminitis  54-year-old female presenting with 2 weeks of on and off right sided abdominal pain with concern for cholecystitis and imaging findings of mildly dilated CBD     Plan  - Recommend GI evaluation given CBD dilation and transaminitis  - Initiate IV ABX with Rocephin/Flagyl for cholangitis prophylaxis  - IVF  - If indicated for cholecystectomy patient would need a percutaneous cholecystostomy tube as she is not a surgical candidate and would recommend IR evaluation at that point  - Okay for diet from surgery standpoint if not undergoing procedures  - Analgesia and antiemetic as needed  - DVT PPx while inpatient      History of Present Illness   Nuris Infante is a 54 y.o. female with history of mental health disorder including depression and previous suicide attempts, chronic pain disorder, surgical history significant for pancreatic pseudocyst fenestration requiring midline laparotomy incision.  Patient endorses 2 weeks of on and off right sided abdominal pain localized to her flank with radiation to her back and epigastric region.  She denies pain associated with food intake.  She denies changes to bowel or bladder habits.  She does subjectively endorse some chills and states that she may have felt warm but is not sure if she has been febrile.  No other associated symptoms.  No other complaints.        Objective :  Temp:  [98 °F (36.7 °C)] 98 °F (36.7 °C)  HR:  [102-121] 102  BP: (117-145)/(76-93) 124/93  Resp:  [18-28] 18  SpO2:  [95 %-100 %] 96 %  O2  Device: None (Room air)    Physical Exam  General - no acute distress, responsive and cooperative  CV - warm, regular rate  Pulm - normal work of breathing, no respiratory distress  Abd - soft, tender to RUQ with positive Bustos, nondistended.    Neuro - m/s grossly intact, cn grossly intact  Ext - moving all extremities       Lab Results: I have reviewed the following results:  Recent Labs     06/14/25  1415 06/14/25  1417 06/14/25  1612   WBC 10.79*  --   --    HGB 12.9  --   --    HCT 38.5  --   --      --   --    SODIUM 129*  --   --    K 4.0  --   --    CL 97  --   --    CO2 19*  --   --    BUN 12  --   --    CREATININE 1.07  --   --    GLUC 196*  --   --    *  --   --    ALT 78*  --   --    ALB 3.2*  --   --    TBILI 0.59  --   --    ALKPHOS 377*  --   --    PTT 24  --   --    INR 0.87  --   --    HSTNI0  --  8  --    HSTNI2  --   --  5   BNP 65  --   --    LACTICACID 3.4*  --  1.8

## 2025-06-14 NOTE — ASSESSMENT & PLAN NOTE
CT pe study w abdomen pelvis w contrast: Mixed solid and groundglass nodule within the right upper lobe is increased in size since 6/7/2023. Findings are suspicious for adenocarcinoma spectrum lesion.   Ambulatory referral placed for outpatient pulmonology evaluation

## 2025-06-14 NOTE — ASSESSMENT & PLAN NOTE
No history of diabetes mellitus.  Monitor with a.m. labs and recheck hemoglobin A1c    Results from last 7 days   Lab Units 06/14/25  1415   GLUCOSE RANDOM mg/dL 196*

## 2025-06-14 NOTE — ASSESSMENT & PLAN NOTE
54-year-old female presenting with 2 weeks of on and off right sided abdominal pain with concern for cholecystitis and imaging findings of mildly dilated CBD     Plan  - Recommend GI evaluation given CBD dilation and transaminitis  - Initiate IV ABX with Rocephin/Flagyl for cholangitis prophylaxis  - IVF  - If indicated for cholecystectomy patient would need a percutaneous cholecystostomy tube as she is not a surgical candidate and would recommend IR evaluation at that point  - Okay for diet from surgery standpoint if not undergoing procedures  - Analgesia and antiemetic as needed  - DVT PPx while inpatient

## 2025-06-14 NOTE — SEPSIS NOTE
"Sepsis Note   Nuris Infante 54 y.o. female MRN: [id number]  Unit/Bed#: ED-40 Encounter: [id number]       Initial Sepsis Screening       Row Name 06/14/25 1451                Is the patient's history suggestive of a new or worsening infection? Yes (Proceed)  -SD        Suspected source of infection suspect infection, source unknown  -SD        Indicate SIRS criteria Tachycardia > 90 bpm;Tachypnea > 20 resp per min  -SD        Are two or more of the above signs & symptoms of infection both present and new to the patient? Yes (Proceed)  -SD        Assess for evidence of organ dysfunction: Are any of the below criteria present within 6 hours of suspected infection and SIRS criteria that are NOT considered to be chronic conditions? Lactate > 2.0  -SD        Date of presentation of severe sepsis 06/14/25  -SD        Time of presentation of severe sepsis 1450  -SD        Sepsis Note: Click \"NEXT\" below (NOT \"close\") to generate sepsis note based on above information. --                  User Key  (r) = Recorded By, (t) = Taken By, (c) = Cosigned By      Initials Name Provider Type    AIDAN Pichardo PA-C Physician Assistant                   Initial Sepsis Screening       Row Name 06/14/25 1451                   Initial Sepsis Assessment    Is the patient's history suggestive of a new or worsening infection? Yes (Proceed)  -SD        Suspected source of infection suspect infection, source unknown  -SD        Indicate SIRS criteria Tachycardia > 90 bpm;Tachypnea > 20 resp per min  -SD        Are two or more of the above signs & symptoms of infection both present and new to the patient? Yes (Proceed)  -SD           If the answer is yes to both above questions, suspicion of sepsis is present. Proceed with algorithm to determine if severe sepsis or septic shock criteria are met.    Assess for evidence of organ dysfunction: Are any of the below criteria present within 6 hours of suspected infection and SIRS " criteria that are NOT considered to be chronic conditions? Lactate > 2.0  -SD           Based on the above information, the patient meets criteria for SEVERE sepsis. CALL A SEPSIS ALERT. Use sepsis order set.     Date of presentation of severe sepsis 06/14/25  -SD        Time of presentation of severe sepsis 1450  -SD                  User Key  (r) = Recorded By, (t) = Taken By, (c) = Cosigned By      Initials Name Provider Type    ANGELITO BernalC Physician Assistant                         There is no height or weight on file to calculate BMI.  Wt Readings from Last 1 Encounters:   11/21/24 54.4 kg (120 lb)        Ideal body weight: 59.3 kg (130 lb 11.7 oz)     Pulmonary Embolism

## 2025-06-14 NOTE — ASSESSMENT & PLAN NOTE
Given above pulmonary nodule finding may be SIADH  Start NSS.  Recheck labs in AM.    Results from last 7 days   Lab Units 06/14/25  1415   SODIUM mmol/L 129*

## 2025-06-15 ENCOUNTER — APPOINTMENT (INPATIENT)
Dept: MRI IMAGING | Facility: HOSPITAL | Age: 55
DRG: 444 | End: 2025-06-15
Payer: COMMERCIAL

## 2025-06-15 PROBLEM — R79.89 ELEVATED LIVER FUNCTION TESTS: Status: ACTIVE | Noted: 2025-06-15

## 2025-06-15 LAB
ALBUMIN SERPL BCG-MCNC: 2.9 G/DL (ref 3.5–5)
ALP SERPL-CCNC: 310 U/L (ref 34–104)
ALT SERPL W P-5'-P-CCNC: 62 U/L (ref 7–52)
ANION GAP SERPL CALCULATED.3IONS-SCNC: 5 MMOL/L (ref 4–13)
APAP SERPL-MCNC: 5 UG/ML (ref 10–20)
AST SERPL W P-5'-P-CCNC: 56 U/L (ref 13–39)
ATRIAL RATE: 117 BPM
BILIRUB SERPL-MCNC: 0.4 MG/DL (ref 0.2–1)
BUN SERPL-MCNC: 10 MG/DL (ref 5–25)
CALCIUM ALBUM COR SERPL-MCNC: 9.2 MG/DL (ref 8.3–10.1)
CALCIUM SERPL-MCNC: 8.3 MG/DL (ref 8.4–10.2)
CHLORIDE SERPL-SCNC: 100 MMOL/L (ref 96–108)
CO2 SERPL-SCNC: 31 MMOL/L (ref 21–32)
CREAT SERPL-MCNC: 1 MG/DL (ref 0.6–1.3)
ERYTHROCYTE [DISTWIDTH] IN BLOOD BY AUTOMATED COUNT: 13.6 % (ref 11.6–15.1)
EST. AVERAGE GLUCOSE BLD GHB EST-MCNC: 131 MG/DL
FERRITIN SERPL-MCNC: 219 NG/ML (ref 30–307)
GFR SERPL CREATININE-BSD FRML MDRD: 63 ML/MIN/1.73SQ M
GLUCOSE SERPL-MCNC: 74 MG/DL (ref 65–140)
HAV IGM SER QL: NORMAL
HBA1C MFR BLD: 6.2 %
HBV CORE IGM SER QL: NORMAL
HBV SURFACE AG SER QL: NORMAL
HCT VFR BLD AUTO: 37.6 % (ref 34.8–46.1)
HCV AB SER QL: NORMAL
HGB BLD-MCNC: 11.8 G/DL (ref 11.5–15.4)
IGA SERPL-MCNC: 330 MG/DL (ref 66–433)
IGG SERPL-MCNC: 814 MG/DL (ref 635–1741)
IGM SERPL-MCNC: 50 MG/DL (ref 45–281)
IRON SATN MFR SERPL: 43 % (ref 15–50)
IRON SERPL-MCNC: 90 UG/DL (ref 50–212)
MCH RBC QN AUTO: 32.9 PG (ref 26.8–34.3)
MCHC RBC AUTO-ENTMCNC: 31.4 G/DL (ref 31.4–37.4)
MCV RBC AUTO: 105 FL (ref 82–98)
P AXIS: 84 DEGREES
PLATELET # BLD AUTO: 342 THOUSANDS/UL (ref 149–390)
PMV BLD AUTO: 10.4 FL (ref 8.9–12.7)
POTASSIUM SERPL-SCNC: 3.4 MMOL/L (ref 3.5–5.3)
PR INTERVAL: 136 MS
PROT SERPL-MCNC: 5.5 G/DL (ref 6.4–8.4)
QRS AXIS: 80 DEGREES
QRSD INTERVAL: 78 MS
QT INTERVAL: 350 MS
QTC INTERVAL: 488 MS
RBC # BLD AUTO: 3.59 MILLION/UL (ref 3.81–5.12)
SODIUM SERPL-SCNC: 136 MMOL/L (ref 135–147)
T WAVE AXIS: 78 DEGREES
TIBC SERPL-MCNC: 210 UG/DL (ref 250–450)
TRANSFERRIN SERPL-MCNC: 150 MG/DL (ref 203–362)
UIBC SERPL-MCNC: 120 UG/DL (ref 155–355)
VENTRICULAR RATE: 117 BPM
WBC # BLD AUTO: 10.15 THOUSAND/UL (ref 4.31–10.16)

## 2025-06-15 PROCEDURE — 83550 IRON BINDING TEST: CPT | Performed by: STUDENT IN AN ORGANIZED HEALTH CARE EDUCATION/TRAINING PROGRAM

## 2025-06-15 PROCEDURE — 80053 COMPREHEN METABOLIC PANEL: CPT | Performed by: INTERNAL MEDICINE

## 2025-06-15 PROCEDURE — 83036 HEMOGLOBIN GLYCOSYLATED A1C: CPT | Performed by: INTERNAL MEDICINE

## 2025-06-15 PROCEDURE — 82103 ALPHA-1-ANTITRYPSIN TOTAL: CPT | Performed by: STUDENT IN AN ORGANIZED HEALTH CARE EDUCATION/TRAINING PROGRAM

## 2025-06-15 PROCEDURE — 80074 ACUTE HEPATITIS PANEL: CPT | Performed by: STUDENT IN AN ORGANIZED HEALTH CARE EDUCATION/TRAINING PROGRAM

## 2025-06-15 PROCEDURE — 86381 MITOCHONDRIAL ANTIBODY EACH: CPT | Performed by: STUDENT IN AN ORGANIZED HEALTH CARE EDUCATION/TRAINING PROGRAM

## 2025-06-15 PROCEDURE — 86038 ANTINUCLEAR ANTIBODIES: CPT | Performed by: STUDENT IN AN ORGANIZED HEALTH CARE EDUCATION/TRAINING PROGRAM

## 2025-06-15 PROCEDURE — 82784 ASSAY IGA/IGD/IGG/IGM EACH: CPT | Performed by: STUDENT IN AN ORGANIZED HEALTH CARE EDUCATION/TRAINING PROGRAM

## 2025-06-15 PROCEDURE — 80143 DRUG ASSAY ACETAMINOPHEN: CPT | Performed by: STUDENT IN AN ORGANIZED HEALTH CARE EDUCATION/TRAINING PROGRAM

## 2025-06-15 PROCEDURE — 82390 ASSAY OF CERULOPLASMIN: CPT | Performed by: STUDENT IN AN ORGANIZED HEALTH CARE EDUCATION/TRAINING PROGRAM

## 2025-06-15 PROCEDURE — 83540 ASSAY OF IRON: CPT | Performed by: STUDENT IN AN ORGANIZED HEALTH CARE EDUCATION/TRAINING PROGRAM

## 2025-06-15 PROCEDURE — 85027 COMPLETE CBC AUTOMATED: CPT | Performed by: INTERNAL MEDICINE

## 2025-06-15 PROCEDURE — 86225 DNA ANTIBODY NATIVE: CPT | Performed by: STUDENT IN AN ORGANIZED HEALTH CARE EDUCATION/TRAINING PROGRAM

## 2025-06-15 PROCEDURE — 74181 MRI ABDOMEN W/O CONTRAST: CPT

## 2025-06-15 PROCEDURE — 99232 SBSQ HOSP IP/OBS MODERATE 35: CPT | Performed by: INTERNAL MEDICINE

## 2025-06-15 PROCEDURE — 86015 ACTIN ANTIBODY EACH: CPT | Performed by: STUDENT IN AN ORGANIZED HEALTH CARE EDUCATION/TRAINING PROGRAM

## 2025-06-15 PROCEDURE — 99255 IP/OBS CONSLTJ NEW/EST HI 80: CPT | Performed by: INTERNAL MEDICINE

## 2025-06-15 PROCEDURE — 93010 ELECTROCARDIOGRAM REPORT: CPT | Performed by: INTERNAL MEDICINE

## 2025-06-15 PROCEDURE — 99232 SBSQ HOSP IP/OBS MODERATE 35: CPT | Performed by: SURGERY

## 2025-06-15 PROCEDURE — 82728 ASSAY OF FERRITIN: CPT | Performed by: STUDENT IN AN ORGANIZED HEALTH CARE EDUCATION/TRAINING PROGRAM

## 2025-06-15 RX ORDER — CYCLOBENZAPRINE HCL 10 MG
10 TABLET ORAL ONCE
Status: COMPLETED | OUTPATIENT
Start: 2025-06-15 | End: 2025-06-15

## 2025-06-15 RX ADMIN — BUSPIRONE HYDROCHLORIDE 15 MG: 10 TABLET ORAL at 07:48

## 2025-06-15 RX ADMIN — HEPARIN SODIUM 5000 UNITS: 5000 INJECTION INTRAVENOUS; SUBCUTANEOUS at 06:15

## 2025-06-15 RX ADMIN — HEPARIN SODIUM 5000 UNITS: 5000 INJECTION INTRAVENOUS; SUBCUTANEOUS at 21:13

## 2025-06-15 RX ADMIN — VENLAFAXINE HYDROCHLORIDE 225 MG: 150 CAPSULE, EXTENDED RELEASE ORAL at 07:54

## 2025-06-15 RX ADMIN — GABAPENTIN 800 MG: 400 CAPSULE ORAL at 21:13

## 2025-06-15 RX ADMIN — HYDROMORPHONE HYDROCHLORIDE 0.5 MG: 1 INJECTION, SOLUTION INTRAMUSCULAR; INTRAVENOUS; SUBCUTANEOUS at 07:48

## 2025-06-15 RX ADMIN — CYCLOBENZAPRINE 10 MG: 10 TABLET, FILM COATED ORAL at 06:15

## 2025-06-15 RX ADMIN — HEPARIN SODIUM 5000 UNITS: 5000 INJECTION INTRAVENOUS; SUBCUTANEOUS at 14:25

## 2025-06-15 RX ADMIN — Medication 1 PATCH: at 07:49

## 2025-06-15 RX ADMIN — OXYCODONE HYDROCHLORIDE 5 MG: 5 TABLET ORAL at 19:22

## 2025-06-15 RX ADMIN — CYCLOBENZAPRINE 10 MG: 10 TABLET, FILM COATED ORAL at 22:30

## 2025-06-15 RX ADMIN — OXYCODONE HYDROCHLORIDE 5 MG: 5 TABLET ORAL at 04:43

## 2025-06-15 RX ADMIN — BUSPIRONE HYDROCHLORIDE 15 MG: 10 TABLET ORAL at 21:13

## 2025-06-15 RX ADMIN — BUDESONIDE AND FORMOTEROL FUMARATE DIHYDRATE 2 PUFF: 160; 4.5 AEROSOL RESPIRATORY (INHALATION) at 07:49

## 2025-06-15 RX ADMIN — BUDESONIDE AND FORMOTEROL FUMARATE DIHYDRATE 2 PUFF: 160; 4.5 AEROSOL RESPIRATORY (INHALATION) at 21:13

## 2025-06-15 RX ADMIN — GABAPENTIN 800 MG: 400 CAPSULE ORAL at 11:46

## 2025-06-15 RX ADMIN — OXYCODONE HYDROCHLORIDE 5 MG: 5 TABLET ORAL at 12:28

## 2025-06-15 RX ADMIN — PANTOPRAZOLE SODIUM 40 MG: 40 TABLET, DELAYED RELEASE ORAL at 06:15

## 2025-06-15 RX ADMIN — ACETAMINOPHEN 650 MG: 325 TABLET, FILM COATED ORAL at 07:49

## 2025-06-15 RX ADMIN — HYDROMORPHONE HYDROCHLORIDE 0.5 MG: 1 INJECTION, SOLUTION INTRAMUSCULAR; INTRAVENOUS; SUBCUTANEOUS at 21:13

## 2025-06-15 RX ADMIN — BUSPIRONE HYDROCHLORIDE 15 MG: 10 TABLET ORAL at 16:56

## 2025-06-15 RX ADMIN — HYDROMORPHONE HYDROCHLORIDE 0.5 MG: 1 INJECTION, SOLUTION INTRAMUSCULAR; INTRAVENOUS; SUBCUTANEOUS at 14:25

## 2025-06-15 NOTE — CASE MANAGEMENT
Case Management Discharge Planning Note    Patient name Nuris Infante  [facility] -* MRN [id number]  : 1970 Date 6/15/2025       Current Admission Date: 2025  Current Admission Diagnosis:Elevated liver function tests   Patient Active Problem List    Diagnosis Date Noted    Elevated liver function tests 06/15/2025    Cholecystitis 2025    Hyponatremia 2025    White coat syndrome without diagnosis of hypertension 2024    Opioid use, unspecified, in remission 2024    S/P colonoscopy 2024    Encounter for tobacco use cessation counseling 2024    Abnormal MRI of head 2023    Alcohol use 2023    Pulmonary nodule 1 cm or greater in diameter 2023    Peroneal neuropathy, right     Foot drop, right 2023    Spinal stenosis of lumbar region with neurogenic claudication 2023    Cyclic citrullinated peptide (CCP) antibody positive 2023    Right foot pain 2022    Ambulatory dysfunction 2022    Diarrhea 10/03/2022    Dilated bile duct 2022    Hyperglycemia 2022    Chronic right shoulder pain 2021    Adnexal cyst 2020    Gastroesophageal reflux disease without esophagitis     Insomnia 2019    Tobacco abuse 10/28/2018    Right knee pain 2018    Generalized anxiety disorder 2018    Mixed hyperlipidemia     Recurrent major depressive disorder, in full remission (HCC)     COPD (chronic obstructive pulmonary disease) (HCC)     Irritable bowel syndrome with diarrhea 2018    DDD (degenerative disc disease), lumbosacral 2014      LOS (days): 1  Geometric Mean LOS (GMLOS) (days):   Days to GMLOS:     OBJECTIVE:  Risk of Unplanned Readmission Score: 12.79         Current admission status: Inpatient   Preferred Pharmacy:   [id number] - [place] - [address]  [address]  [place] [address]  Phone: [phone] Fax:  [phone]    Primary Care Provider: Bruce Leon DO    Primary Insurance:   Secondary Insurance:     DISCHARGE DETAILS:                                                                                                    Additional Comments: Pt currently listed as self pay, insurance card on file - emailed IP insurance verifiers to request confirmation of active plan.

## 2025-06-15 NOTE — ASSESSMENT & PLAN NOTE
Given above pulmonary nodule finding may be SIADH  Start NSS.  Recheck labs in AM.    Results from last 7 days   Lab Units 06/15/25  0441 06/14/25  1415   SODIUM mmol/L 136 129*

## 2025-06-15 NOTE — ED PROVIDER NOTES
Time reflects when diagnosis was documented in both MDM as applicable and the Disposition within this note       Time User Action Codes Description Comment    6/14/2025  6:05 PM Dominguez Pichardo Add [K83.8] Dilated cbd, acquired     6/14/2025  6:27 PM Dominguez Pichardo Add [A41.9,  R65.20] Severe sepsis (HCC)     6/14/2025  6:51 PM Atul Adan Add [R91.1] Pulmonary nodule 1 cm or greater in diameter           ED Disposition       ED Disposition   Admit    Condition   Stable    Date/Time   Sat Jun 14, 2025  6:27 PM    Comment   Case was discussed with Dr. Adan and the patient's admission status was agreed to be Admission Status: inpatient status to the service of Dr. Adan.               Assessment & Plan       Medical Decision Making  54-year-old female presenting to the emergency department today with pleuritic right-sided rib pain associated with right upper quadrant and midline epigastric pain.  Ongoing for 1 week.  Has nausea with vomiting.  No lower extremity swelling.  Vitals show tachycardia and tachypnea without fever.  On physical examination, the patient has right upper quadrant tenderness to palpation with a positive Bustos sign.  Also has midline epigastric tenderness to palpation.  Differential diagnosis includes but is not limited to cholecystitis, choledocholithiasis, pancreatitis, pulmonary embolus, ACS, etc.  EKG shows sinus tachycardia with a rate of 117.  Chest x-ray without acute cardiopulmonary disease my independent interpretation.  Lipase 168.  Patient has transaminitis and elevation to alkaline phosphatase levels.  She has lactic acidosis at 3.4.  She meets severe sepsis criteria.  She was dosed with Ofirmev, morphine, Zofran, and Dilaudid while here in the emergency department secondary to poorly controlled pain.  She had an elevation to her D-dimer as well.  CT PE study of the chest, abdomen, and pelvis shows dilation of the common bile duct which was also seen on ultrasound  "of the right upper quadrant.  No evidence of cholelithiasis.  Case was discussed with Dr. Gongora with general surgery who notes the patient is not a surgical candidate.  As patient meets severe sepsis criteria, she was given Rocephin in addition to 30 cc/kg intravenous fluids while here.  Case was discussed with Dr. Adan who accepts the patient for admission.  The patient and/or patient's proxy verify their understanding and agree to the plan at this time.  All questions answered to the patient and/or their proxy's satisfaction.  All labs reviewed and utilized in the medical decision making process (if labs were ordered).  Portions of the record may have been created with voice recognition software.  Occasional wrong word or \"sound a like\" substitutions may have occurred due to the inherent limitations of voice recognition software.  Read the chart carefully and recognize, using context, where substitutions have occurred.    I reviewed prior notes.  I reviewed prior labs.  Case discussed with  at bedside.    Critical Care Time Statement: Upon my evaluation, this patient had a high probability of imminent or life-threatening deterioration due to severe sepsis, which required my direct attention, intervention, and personal management.  I spent a total of 32 minutes directly providing critical care services, including interpretation of complex medical databases, evaluating for the presence of life-threatening injuries or illnesses, management of organ system failure(s) , complex medical decision making (to support/prevent further life-threatening deterioration)., interpretation of hemodynamic data, and titration of continuous IV medications (drips). This time is exclusive of procedures, teaching, treating other patients, family meetings, and any prior time recorded by providers other than myself.     Problems Addressed:  Dilated cbd, acquired: undiagnosed new problem with uncertain prognosis  Severe sepsis " (HCC): undiagnosed new problem with uncertain prognosis    Amount and/or Complexity of Data Reviewed  Independent Historian: spouse  External Data Reviewed: labs and notes.  Labs: ordered. Decision-making details documented in ED Course.  Radiology: ordered and independent interpretation performed. Decision-making details documented in ED Course.     Details: CXR  ECG/medicine tests: ordered and independent interpretation performed. Decision-making details documented in ED Course.  Discussion of management or test interpretation with external provider(s): Dr. Gongora - General Surgery  Dr. Adan - [facility]  Prescription drug management.  Decision regarding hospitalization.        ED Course as of 06/14/25 2033   Sat Jun 14, 2025   1452 LACTIC ACID(!): 3.4  IVF ordered.       Medications   lidocaine (LIDODERM) 5 % patch 1 patch (1 patch Topical Medication Applied 6/14/25 1415)   busPIRone (BUSPAR) tablet 15 mg (has no administration in time range)   pantoprazole (PROTONIX) EC tablet 40 mg (has no administration in time range)   Suvorexant TABS 10 mg (has no administration in time range)   venlafaxine (EFFEXOR-XR) 24 hr capsule 225 mg (has no administration in time range)   albuterol (PROVENTIL HFA,VENTOLIN HFA) inhaler 2 puff (has no administration in time range)   gabapentin (NEURONTIN) capsule 800 mg (has no administration in time range)   budesonide-formoterol (SYMBICORT) 160-4.5 mcg/act inhaler 2 puff (has no administration in time range)   nicotine (NICODERM CQ) 14 mg/24hr TD 24 hr patch 1 patch (has no administration in time range)   heparin (porcine) subcutaneous injection 5,000 Units (has no administration in time range)   acetaminophen (TYLENOL) tablet 650 mg (has no administration in time range)   ondansetron (ZOFRAN) injection 4 mg (has no administration in time range)   oxyCODONE (ROXICODONE) IR tablet 5 mg (has no administration in time range)   HYDROmorphone (DILAUDID) injection 0.5 mg (has no  administration in time range)   LORazepam (ATIVAN) injection 1 mg (has no administration in time range)   sodium chloride 0.9 % infusion (has no administration in time range)   acetaminophen (Ofirmev) injection 1,000 mg (0 mg Intravenous Stopped 6/14/25 1444)   ceftriaxone (ROCEPHIN) 1 g/50 mL in dextrose IVPB (0 mg Intravenous Stopped 6/14/25 1526)   morphine injection 4 mg (4 mg Intravenous Given 6/14/25 1454)   ondansetron (ZOFRAN) injection 4 mg (4 mg Intravenous Given 6/14/25 1453)   multi-electrolyte (Plasmalyte-A/Isolyte-S PH 7.4/Normosol-R) IV bolus 1,750 mL (0 mL Intravenous Stopped 6/14/25 1742)   iohexol (OMNIPAQUE) 350 MG/ML injection (MULTI-DOSE) 100 mL (100 mL Intravenous Given 6/14/25 1559)   HYDROmorphone (DILAUDID) injection 0.5 mg (0.5 mg Intravenous Given 6/14/25 1632)       ED Risk Strat Scores                    No data recorded        SBIRT 20yo+      Flowsheet Row Most Recent Value   Initial Alcohol Screen: US AUDIT-C     1. How often do you have a drink containing alcohol? 0 Filed at: 06/14/2025 1341   2. How many drinks containing alcohol do you have on a typical day you are drinking?  0 Filed at: 06/14/2025 1341   3b. FEMALE Any Age, or MALE 65+: How often do you have 4 or more drinks on one occassion? 0 Filed at: 06/14/2025 1341   Audit-C Score 0 Filed at: 06/14/2025 1341   KIKI: How many times in the past year have you...    Used an illegal drug or used a prescription medication for non-medical reasons? Never Filed at: 06/14/2025 1341                            History of Present Illness       Chief Complaint   Patient presents with    Rib Pain     Pt reports right sided rib pain for approx 1 wk, sob. Hx of broken ribs        Past Medical History[1]   Past Surgical History[2]   Family History[3]   Social History[4]   E-Cigarette/Vaping    E-Cigarette Use Never User       E-Cigarette/Vaping Substances    Nicotine No     THC No     CBD No     Flavoring No     Other No     Unknown No       I  "have reviewed and agree with the history as documented.     This is a 54-year-old female with past medical history significant for prior broken ribs to the right rib cage presenting to the emergency department today for right-sided rib pain associated with right upper quadrant pain.  Symptoms have been ongoing for 1 week.  She denies any trauma to the chest.  She denies any associated diarrhea or constipation but she does note nausea with numerous episodes of nonbloody nonbilious vomiting.  Pain is not worse after she eats.  It does not radiate to her back.  She denies any dysuria, hematuria, or foul-smelling urine.  She denies any chest pain per se but does note pleuritic rib pain with shortness of breath.  She denies any lower extremity swelling.  No prior history of venous thromboemboli or pulmonary emboli.  The patient denies other complaints at this time.      History provided by:  Patient   used: No    Abdominal Pain  Pain location:  RUQ and epigastric  Pain quality: sharp    Pain radiation: right-sided rib cage.  Pain severity:  Moderate  Onset quality:  Gradual  Duration:  1 week  Timing:  Intermittent  Progression:  Waxing and waning  Chronicity:  New  Relieved by:  Nothing  Worsened by:  Deep breathing  Ineffective treatments:  None tried  Associated symptoms: chest pain (\"rib pain\"), nausea and vomiting    Associated symptoms: no anorexia, no belching, no chills, no constipation, no cough, no diarrhea, no dysuria, no fatigue, no fever, no flatus, no hematuria, no melena, no shortness of breath, no sore throat, no vaginal bleeding and no vaginal discharge        Review of Systems   Constitutional:  Negative for appetite change, chills, diaphoresis, fatigue and fever.   HENT:  Negative for sore throat.    Eyes:  Negative for visual disturbance.   Respiratory:  Negative for cough, chest tightness, shortness of breath and wheezing.    Cardiovascular:  Positive for chest pain (\"rib pain\"). " Negative for palpitations and leg swelling.   Gastrointestinal:  Positive for abdominal pain, nausea and vomiting. Negative for anorexia, constipation, diarrhea, flatus and melena.   Genitourinary:  Negative for dysuria, hematuria, vaginal bleeding and vaginal discharge.   Musculoskeletal:  Negative for neck pain and neck stiffness.   Skin:  Negative for rash and wound.   Neurological:  Negative for dizziness, seizures, syncope, weakness, light-headedness, numbness and headaches.   Psychiatric/Behavioral:  Negative for confusion.    All other systems reviewed and are negative.          Objective       ED Triage Vitals   Temperature Pulse Blood Pressure Respirations SpO2 Patient Position - Orthostatic VS   06/14/25 1341 06/14/25 1341 06/14/25 1341 06/14/25 1341 06/14/25 1341 06/14/25 1341   98 °F (36.7 °C) (!) 121 117/76 (!) 28 100 % Sitting      Temp Source Heart Rate Source BP Location FiO2 (%) Pain Score    06/14/25 1341 06/14/25 1341 06/14/25 1341 -- 06/14/25 1454    Oral Monitor Left arm  7      Vitals      Date and Time Temp Pulse SpO2 Resp BP Pain Score FACES Pain Rating User   06/14/25 2017 -- -- -- -- -- 8 --    06/14/25 2007 -- -- -- 20 -- -- --    06/14/25 2007 98 °F (36.7 °C) 103 96 % -- 159/97 -- -- [facility]   06/14/25 1730 -- 102 96 % 18 124/93 -- --    06/14/25 1632 -- -- -- -- -- 7 --    06/14/25 1613 -- 103 99 % 20 145/82 7 --    06/14/25 1606 -- 104 -- -- -- -- --    06/14/25 1530 -- 114 95 % 18 145/87 -- --    06/14/25 1500 -- 113 96 % 20 137/87 -- --    06/14/25 1454 -- -- -- -- -- 7 --    06/14/25 1347 -- 109 -- -- -- -- --    06/14/25 1341 98 °F (36.7 °C) 121 100 % 28 117/76 -- -- MICHAEL            Physical Exam  Vitals and nursing note reviewed.   Constitutional:       General: She is in acute distress.      Appearance: Normal appearance. She is normal weight. She is not ill-appearing, toxic-appearing or diaphoretic.      Comments: Appears uncomfortable   HENT:      Head:  Normocephalic and atraumatic.      Nose: Nose normal. No congestion or rhinorrhea.      Mouth/Throat:      Mouth: Mucous membranes are moist.      Pharynx: No oropharyngeal exudate or posterior oropharyngeal erythema.     Eyes:      General: No scleral icterus.        Right eye: No discharge.         Left eye: No discharge.      Extraocular Movements: Extraocular movements intact.      Pupils: Pupils are equal, round, and reactive to light.       Cardiovascular:      Rate and Rhythm: Regular rhythm. Tachycardia present.      Pulses: Normal pulses.      Heart sounds: Normal heart sounds. No murmur heard.     No friction rub. No gallop.   Pulmonary:      Effort: Pulmonary effort is normal. No respiratory distress.      Breath sounds: Normal breath sounds. No stridor. No wheezing, rhonchi or rales.      Comments: Tachypnea noted  Chest:      Chest wall: No tenderness.   Abdominal:      General: Abdomen is flat. There is no distension.      Palpations: Abdomen is soft.      Tenderness: There is abdominal tenderness (RUQ and midline epigastrum with positive Bustos sign). There is guarding. There is no right CVA tenderness, left CVA tenderness or rebound.     Musculoskeletal:         General: Normal range of motion.      Cervical back: Normal range of motion. No tenderness.      Right lower leg: No edema.      Left lower leg: No edema.     Skin:     General: Skin is warm and dry.      Capillary Refill: Capillary refill takes less than 2 seconds.      Coloration: Skin is not jaundiced or pale.     Neurological:      General: No focal deficit present.      Mental Status: She is alert and oriented to person, place, and time. Mental status is at baseline.     Psychiatric:         Mood and Affect: Mood normal.         Behavior: Behavior normal.         Results Reviewed       Procedure Component Value Units Date/Time    B-Type Natriuretic Peptide(BNP) [[id number]]  (Normal) Collected: 06/14/25 1415    Lab Status: Final result  Specimen: Blood from Arm, Left Updated: 06/14/25 1650     BNP 65 pg/mL     HS Troponin I 2hr [[id number]]  (Normal) Collected: 06/14/25 1612    Lab Status: Final result Specimen: Blood from Arm, Left Updated: 06/14/25 1648     hs TnI 2hr 5 ng/L      Delta 2hr hsTnI -3 ng/L     Lactic acid 2 Hours [[id number]]  (Normal) Collected: 06/14/25 1612    Lab Status: Final result Specimen: Blood from Arm, Left Updated: 06/14/25 1641     LACTIC ACID 1.8 mmol/L     Narrative:      Result may be elevated if tourniquet was used during collection.    D-dimer, quantitative [[id number]]  (Abnormal) Collected: 06/14/25 1415    Lab Status: Final result Specimen: Blood from Arm, Left Updated: 06/14/25 1523     D-Dimer, Quant 1.72 ug/ml FEU     Narrative:      In the evaluation for possible pulmonary embolism, in the appropriate (Well's Score of 4 or less) patient, the age adjusted d-dimer cutoff for this patient can be calculated as:    Age x 0.01 (in ug/mL) for Age-adjusted D-dimer exclusion threshold for a patient over 50 years.    Blood culture #2 [[id number]] Collected: 06/14/25 1451    Lab Status: In process Specimen: Blood from Hand, Left Updated: 06/14/25 1506    Blood culture #1 [[id number]] Collected: 06/14/25 1451    Lab Status: In process Specimen: Blood from Arm, Left Updated: 06/14/25 1506    Procalcitonin [[id number]]  (Normal) Collected: 06/14/25 1415    Lab Status: Final result Specimen: Blood from Arm, Left Updated: 06/14/25 1506     Procalcitonin 0.15 ng/ml     HS Troponin 0hr (reflex protocol) [[id number]]  (Normal) Collected: 06/14/25 1417    Lab Status: Final result Specimen: Blood from Arm, Left Updated: 06/14/25 1446     hs TnI 0hr 8 ng/L     Lactic acid [[id number]]  (Abnormal) Collected: 06/14/25 1415    Lab Status: Final result Specimen: Blood from Arm, Left Updated: 06/14/25 1444     LACTIC ACID 3.4 mmol/L     Narrative:      Result may be elevated if tourniquet was used during collection.    Comprehensive metabolic  panel [[id number]]  (Abnormal) Collected: 06/14/25 1415    Lab Status: Final result Specimen: Blood from Arm, Left Updated: 06/14/25 1443     Sodium 129 mmol/L      Potassium 4.0 mmol/L      Chloride 97 mmol/L      CO2 19 mmol/L      ANION GAP 13 mmol/L      BUN 12 mg/dL      Creatinine 1.07 mg/dL      Glucose 196 mg/dL      Calcium 8.7 mg/dL      Corrected Calcium 9.3 mg/dL       U/L      ALT 78 U/L      Alkaline Phosphatase 377 U/L      Total Protein 6.2 g/dL      Albumin 3.2 g/dL      Total Bilirubin 0.59 mg/dL      eGFR 58 ml/min/1.73sq m     Narrative:      [employer] Kid[employer] guidelines for Chronic Kidney Disease (CKD):     Stage 1 with normal or high GFR (GFR > 90 mL/min/1.73 square meters)    Stage 2 Mild CKD (GFR = 60-89 mL/min/1.73 square meters)    Stage 3A Moderate CKD (GFR = 45-59 mL/min/1.73 square meters)    Stage 3B Moderate CKD (GFR = 30-44 mL/min/1.73 square meters)    Stage 4 Severe CKD (GFR = 15-29 mL/min/1.73 square meters)    Stage 5 End Stage CKD (GFR <15 mL/min/1.73 square meters)  Note: GFR calculation is accurate only with a steady state creatinine    Lipase [[id number]]  (Abnormal) Collected: 06/14/25 1415    Lab Status: Final result Specimen: Blood from Arm, Left Updated: 06/14/25 1443     Lipase 168 u/L     Protime-INR [[id number]]  (Abnormal) Collected: 06/14/25 1415    Lab Status: Final result Specimen: Blood from Arm, Left Updated: 06/14/25 1437     Protime 12.1 seconds      INR 0.87    Narrative:      INR Therapeutic Range    Indication                                             INR Range      Atrial Fibrillation                                               2.0-3.0  Hypercoagulable State                                    2.0.2.3  Left Ventricular Asist Device                            2.0-3.0  Mechanical Heart Valve                                  -    Aortic(with afib, MI, embolism, HF, LA enlargement,    and/or coagulopathy)                                      2.0-3.0 (2.5-3.5)     Mitral                                                             2.5-3.5  Prosthetic/Bioprosthetic Heart Valve               2.0-3.0  Venous thromboembolism (VTE: VT, PE        2.0-3.0    APTT [[id number]]  (Normal) Collected: 06/14/25 1415    Lab Status: Final result Specimen: Blood from Arm, Left Updated: 06/14/25 1437     PTT 24 seconds     CBC and differential [[id number]]  (Abnormal) Collected: 06/14/25 1415    Lab Status: Final result Specimen: Blood from Arm, Left Updated: 06/14/25 1425     WBC 10.79 Thousand/uL      RBC 3.78 Million/uL      Hemoglobin 12.9 g/dL      Hematocrit 38.5 %       fL      MCH 34.1 pg      MCHC 33.5 g/dL      RDW 13.4 %      MPV 10.1 fL      Platelets 372 Thousands/uL      nRBC 0 /100 WBCs      Segmented % 71 %      Immature Grans % 0 %      Lymphocytes % 24 %      Monocytes % 4 %      Eosinophils Relative 0 %      Basophils Relative 1 %      Absolute Neutrophils 7.55 Thousands/µL      Absolute Immature Grans 0.04 Thousand/uL      Absolute Lymphocytes 2.62 Thousands/µL      Absolute Monocytes 0.44 Thousand/µL      Eosinophils Absolute 0.03 Thousand/µL      Basophils Absolute 0.11 Thousands/µL             US right upper quadrant   Final Interpretation by Romel Polanco MD (06/14 1804)      Dilated CBD. No evidence for cholelithiasis. Correlate with prior CT for additional findings.      Workstation performed: [id number]         CT pe study w abdomen pelvis w contrast   Final Interpretation by Romel Polanco MD (06/14 1803)      No evidence for pulmonary embolism.      Mixed solid and groundglass nodule within the right upper lobe is increased in size since 6/7/2023. Findings are suspicious for adenocarcinoma spectrum lesion.      Circumferential wall thickening of the duodenum suggesting the presence of duodenitis. Underlying neoplastic etiologies are not entirely excluded.      Dilatation of the CBD and pancreatic duct is new since the prior examination. No  discrete mass lesion identified on the current examination. MRCP is recommended for further evaluation. 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      Remainder of the findings, as described above.      The study was marked in EPIC for immediate notification.            Computerized Assisted Algorithm (CAA) may have aided analysis of applicable images.      Workstation performed: [id number]         XR chest portable    (Results Pending)   MRI inpatient order    (Results Pending)       ECG 12 Lead Documentation Only    Date/Time: 6/14/2025 2:09 PM    Performed by: Domniguez Pichardo PA-C  Authorized by: Dominguez Pichardo PA-C    Indications / Diagnosis:  RUQ Pain; Rib Pain  Patient location:  ED  Previous ECG:     Previous ECG:  Compared to current    Comparison ECG info:  2/8/2023    Similarity:  Changes noted  Interpretation:     Interpretation: abnormal    Rate:     ECG rate:  117    ECG rate assessment: tachycardic    Rhythm:     Rhythm: sinus tachycardia    Ectopy:     Ectopy: none    QRS:     QRS axis:  Normal  Conduction:     Conduction: normal    ST segments:     ST segments:  Normal  T waves:     T waves: normal    Comments:      Sinus tachycardia with a rate of 117.  Normal axis.  No ectopy.  No STEMI.  QTc 488.      ED Medication and Procedure Management   Prior to Admission Medications   Prescriptions Last Dose Informant Patient Reported? Taking?   Suvorexant 10 MG TABS   No No   Sig: Take 1 tablet (10 mg total) by mouth daily at bedtime   albuterol (PROVENTIL HFA,VENTOLIN HFA) 90 mcg/act inhaler   No No   Sig: Inhale 2 puffs every 6 (six) hours as needed for wheezing   budesonide-formoterol (SYMBICORT) 160-4.5 mcg/act inhaler   No No   Sig: Use 2 puffs twice daily.  Rinse after use   busPIRone (BUSPAR) 10 mg tablet   No No   Sig: Take 1.5 tablets (15 mg  total) by mouth 3 (three) times a day   gabapentin (NEURONTIN) 400 mg capsule   No No   Sig: TAKE 2 CAPSULES BY MOUTH ONCE DAILY IN THE MORNING AND 2 IN THE EVENING   lidocaine (Lidoderm) 5 %   No No   Sig: Apply 1 patch topically over 12 hours daily Remove & Discard patch within 12 hours or as directed by MD   nicotine (NICODERM CQ) 21 mg/24 hr TD 24 hr patch   No No   Sig: Place 1 patch on the skin over 24 hours every 24 hours   omeprazole (PriLOSEC) 20 mg delayed release capsule   No No   Sig: Take 1 capsule (20 mg total) by mouth daily before breakfast   venlafaxine (EFFEXOR-XR) 150 mg 24 hr capsule   No No   Sig: Take 1 capsule (150 mg total) by mouth daily Take in combination with 75mg Effexor for total daily dose of (225mg)   venlafaxine (EFFEXOR-XR) 75 mg 24 hr capsule   No No   Sig: Take 1 capsule (75 mg total) by mouth daily Take in combination with 150mg Effexor for total daily dose of (225mg)      Facility-Administered Medications: None     Current Discharge Medication List        CONTINUE these medications which have NOT CHANGED    Details   gabapentin (NEURONTIN) 400 mg capsule TAKE 2 CAPSULES BY MOUTH ONCE DAILY IN THE MORNING AND 2 IN THE EVENING  Qty: 120 capsule, Refills: 0    Associated Diagnoses: Peroneal neuropathy, right      albuterol (PROVENTIL HFA,VENTOLIN HFA) 90 mcg/act inhaler Inhale 2 puffs every 6 (six) hours as needed for wheezing  Qty: 18 g, Refills: 5    Comments: Substitution to a formulary equivalent within the same pharmaceutical class is authorized.  Associated Diagnoses: Pulmonary emphysema, unspecified emphysema type (HCC)      budesonide-formoterol (SYMBICORT) 160-4.5 mcg/act inhaler Use 2 puffs twice daily.  Rinse after use  Qty: 10.2 g, Refills: 5    Associated Diagnoses: Pulmonary emphysema, unspecified emphysema type (HCC)      busPIRone (BUSPAR) 10 mg tablet Take 1.5 tablets (15 mg total) by mouth 3 (three) times a day  Qty: 405 tablet, Refills: 0    Associated Diagnoses:  Generalized anxiety disorder      lidocaine (Lidoderm) 5 % Apply 1 patch topically over 12 hours daily Remove & Discard patch within 12 hours or as directed by MD  Qty: 30 patch, Refills: 3    Associated Diagnoses: Spinal stenosis of lumbar region with neurogenic claudication      nicotine (NICODERM CQ) 21 mg/24 hr TD 24 hr patch Place 1 patch on the skin over 24 hours every 24 hours  Qty: 28 patch, Refills: 1    Associated Diagnoses: Encounter for tobacco use cessation counseling; Tobacco abuse      omeprazole (PriLOSEC) 20 mg delayed release capsule Take 1 capsule (20 mg total) by mouth daily before breakfast  Qty: 90 capsule, Refills: 0    Associated Diagnoses: Gastroesophageal reflux disease without esophagitis      Suvorexant 10 MG TABS Take 1 tablet (10 mg total) by mouth daily at bedtime  Qty: 90 tablet, Refills: 1    Comments: Patient is getting a coupon for Belsomra  Associated Diagnoses: Other insomnia      !! venlafaxine (EFFEXOR-XR) 150 mg 24 hr capsule Take 1 capsule (150 mg total) by mouth daily Take in combination with 75mg Effexor for total daily dose of (225mg)  Qty: 90 capsule, Refills: 0    Associated Diagnoses: Generalized anxiety disorder; Severe episode of recurrent major depressive disorder, without psychotic features (HCC)      !! venlafaxine (EFFEXOR-XR) 75 mg 24 hr capsule Take 1 capsule (75 mg total) by mouth daily Take in combination with 150mg Effexor for total daily dose of (225mg)  Qty: 90 capsule, Refills: 0    Associated Diagnoses: Generalized anxiety disorder; Severe episode of recurrent major depressive disorder, without psychotic features (HCC)       !! - Potential duplicate medications found. Please discuss with provider.          ED SEPSIS DOCUMENTATION   Time reflects when diagnosis was documented in both MDM as applicable and the Disposition within this note       Time User Action Codes Description Comment    6/14/2025  6:05 PM Dominguez Pichardo Add [K83.8] Dilated cbd,  "acquired     2025  6:27 PM Dominguez Pichardo Add [A41.9,  R65.20] Severe sepsis (HCC)     2025  6:51 PM LocoEnoche Add [R91.1] Pulmonary nodule 1 cm or greater in diameter            Initial Sepsis Screening       Row Name 25 1451                Is the patient's history suggestive of a new or worsening infection? Yes (Proceed)  -SD        Suspected source of infection suspect infection, source unknown  -SD        Indicate SIRS criteria Tachycardia > 90 bpm;Tachypnea > 20 resp per min  -SD        Are two or more of the above signs & symptoms of infection both present and new to the patient? Yes (Proceed)  -SD        Assess for evidence of organ dysfunction: Are any of the below criteria present within 6 hours of suspected infection and SIRS criteria that are NOT considered to be chronic conditions? Lactate > 2.0  -SD        Date of presentation of severe sepsis 25  -SD        Time of presentation of severe sepsis 1450  -SD        Sepsis Note: Click \"NEXT\" below (NOT \"close\") to generate sepsis note based on above information. --                  User Key  (r) = Recorded By, (t) = Taken By, (c) = Cosigned By      Initials Name Provider Type    SD Dominguez Pichardo PA-C Physician Assistant                         [1]   Past Medical History:  Diagnosis Date    ARF (acute renal failure) (HCC) 2022    Aspiration pneumonitis (HCC) 10/1/2022    Bacteremia due to methicillin susceptible Staphylococcus aureus (MSSA) 2022    Chronic pain disorder     Depression     Foreign body of right eye 2023    GERD (gastroesophageal reflux disease)     History of electroconvulsive therapy     Localized swelling of right upper extremity 2022    Low back pain     Self-injurious behavior     Suicide attempt ([facility])    [2]   Past Surgical History:  Procedure Laterality Date     SECTION      COLONOSCOPY      [place] LUMBAR PUNCTURE DIAGNOSTIC  2014    PANCREAS SURGERY      " "\"pseudocysts\" per patient's  Ricki Infante    [place] ESOPHAGOGASTRODUODENOSCOPY TRANSORAL DIAGNOSTIC N/A 4/10/2018    Procedure: EGD AND COLONOSCOPY;  Surgeon: Gaurang De La Paz MD;  [place] [facility];  Service: Gastroenterology   [3]   Family History  Problem Relation Name Age of Onset    Arthritis Mother      Coronary artery disease Mother          MI in her 60's    Parkinsonism Father          with falls and SDH    No Known Problems Sister      No Known Problems Daughter      No Known Problems Maternal Grandmother      Colon cancer Maternal Grandfather  78    Parkinsonism Paternal Grandmother      Coronary artery disease Paternal Grandfather      Parkinsonism Paternal Aunt      No Known Problems Paternal Aunt      Colon cancer Family Grandfather     Depression Neg Hx     [4]   Social History  Tobacco Use    Smoking status: Some Days     Current packs/day: 0.50     Average packs/day: 0.5 packs/day for 35.0 years (17.5 ttl pk-yrs)     Types: Cigarettes    Smokeless tobacco: Never   Vaping Use    Vaping status: Never Used   Substance Use Topics    Alcohol use: Not Currently     Comment: \"occasional glass of wine\"    Drug use: Not Currently     Types: Marijuana, Cocaine     Comment: medical, MARIJUANA        Dominguez Pichardo PA-C  06/14/25 2033    "

## 2025-06-15 NOTE — ASSESSMENT & PLAN NOTE
History of GERD.  Symptoms currently controlled at this time.  Okay to continue on Protonix 40 mg daily while inpatient.

## 2025-06-15 NOTE — ASSESSMENT & PLAN NOTE
History of COPD mood disorder tobacco use and neuropathy who presents to the hospital for worsening right-sided abdominal/flank pains  In the ED underwent ultrasound and CT.  No evidence of cholecystitis but there are concerning findings for dilatation of CBD/pancreatic ducts  Will order MRI/MRCP and consult GI.  Low-fat diet    Results from last 7 days   Lab Units 06/15/25  0441 06/14/25  1415   AST U/L 56* 129*   ALT U/L 62* 78*   TOTAL BILIRUBIN mg/dL 0.40 0.59

## 2025-06-15 NOTE — CONSULTS
Consultation - Gastroenterology   Name: Nuris Infante 54 y.o. female I MRN: [id number]  Unit/Bed#: E5 -01 I Date of Admission: 6/14/2025   Date of Service: 6/15/2025 I Hospital Day: 1       Inpatient consult to gastroenterology     Date/Time  6/15/2025 7:48 AM     Performed by  Lexi Reese DO   Authorized by  Atul Adan DO           Physician Requesting Evaluation: Morales Alas DO   Reason for Evaluation / Principal Problem: Dilated CBD    Assessment & Plan  Elevated liver function tests  54-year-old female with history of COPD, depression, GERD, tobacco use, and prior pancreatic pseudocyst requiring surgical intervention and midline laparotomy incision.  Patient presenting with a few week history of epigastric and right upper quadrant abdominal pain.  Associated with occasional nausea and.  No fever/chills.  Patient has been taking around-the-clock Tylenol for pain relief.  On arrival, patient hemodynamically stable but tachycardic.  Labs demonstrating elevated liver chemistries including AST//78, alkaline phosphatase 377, and total bilirubin 0.59.  INR 0.87.  Lipase 168.  WBC 10.7 with lactic acidosis of 3.4 which corrected with IV fluid administration.  Pattern of liver chemistry mixed picture.  However, patient did have right upper quadrant ultrasound demonstrating a dilated gallbladder and CBD measuring 10 mm.  No intrahepatic dilation seen or evidence of choledocholithiasis.  MRI/MRCP now pending given patient's presenting symptoms and normal bilirubin yet dilated CBD.  Would also consider DILI in differential and will plan to complete serologic workup given mixed pattern of elevation and history of elevated liver enzymes in the past.  At this time, would recommend conservative management while awaiting MRI results.  From GI standpoint, no clinical concern for cholangitis to warrant antibiotics.  However will defer need to antibiotics to general surgery team if concern for  cholecystitis.    Plan:  Acetaminophen level and acute hepatitis ordered along with complete serologic liver workup  MRI/MRCP given dilated CBD ordered and pending completion  No GI concern for cholangitis, will defer antibiotics to surgery team if concern for cholecystitis  Continue to monitor and trend LFTs to ensure improvement  Avoid use of hepatotoxic agents  General Surgery consulted and appreciate recommendations  Additional pain and symptom management per primary team  Dilated bile duct  CBD dilated to 10 mm on right upper quadrant ultrasound.  MRI/MRCP ordered to further evaluate see additional plan as above  Gastroesophageal reflux disease without esophagitis  History of GERD.  Symptoms currently controlled at this time.  Okay to continue on Protonix 40 mg daily while inpatient.    Please contact the SecureChat role for the Gastroenterology service with any questions/concerns.    History of Present Illness   HPI:  Nuris Infante is a 54 y.o. female with a PMHx of COPD, depression, GERD, prior pancreatic pseudocyst requiring surgical intervention and midline laparotomy incision and tobacco use who presented to [facility] ED with the complaint of worsening abdominal pain. Pain primarily on the right side and had been present intermittently over the last month but has now started to become more consistent. Pain associated with N/V but no fever/chills and pain is slightly worsened by oral intake.  She states that she has been taking Tylenol multiple times a day (2 tablets every 6 hours) to help with the pain.  She explains that this did provide her with pain relief previously but more recently it had not been helping take away the pain leading to her presentation.    At time of ED arrival, patient hemodynamically stable and febrile but tachycardic. Labs demonstrating elevated LFTS: AST//78, , and TB 0.59. INR 0.87.  Lipase 168.  Patient has had elevations in LFTs in the past,primarily hepatocellular  with transaminase elevations. WBC 10.79 with a lactic acid of 3.4. Labs otherwise unremarkable.RUQ US revealed a dilated gallbladder with no discrete wall thickening. Dilated CBD also noted and measuring 10 mm but no intrahepatic dilation.     Patient seen and examined at bedside.  Sitting in bed comfortably in no acute distress or visible discomfort.  Does admit to improvement in abdominal pain since admission.  However, pain has continued to persist.  Pain primarily in right upper quadrant.  Currently denies any nausea/vomiting.  Admits to current tobacco use.  No active alcohol use.  No additional medications outside of around-the-clock Tylenol.     Review of Systems  Medical History Review: I have reviewed the patient's PMH, PSH, Social History, Family History, Meds, and Allergies   Historical Information   Past Medical History[1]  Past Surgical History[2]  Social History[3]  E-Cigarette/Vaping    E-Cigarette Use Never User      E-Cigarette/Vaping Substances    Nicotine No     THC No     CBD No     Flavoring No     Other No     Unknown No      Family history non-contributory  Social History[4]    Current Facility-Administered Medications:     acetaminophen (TYLENOL) tablet 650 mg, Q4H PRN    albuterol (PROVENTIL HFA,VENTOLIN HFA) inhaler 2 puff, Q6H PRN    budesonide-formoterol (SYMBICORT) 160-4.5 mcg/act inhaler 2 puff, BID    busPIRone (BUSPAR) tablet 15 mg, TID    gabapentin (NEURONTIN) capsule 800 mg, Q12H CHOCO    heparin (porcine) subcutaneous injection 5,000 Units, Q8H CHOCO    HYDROmorphone (DILAUDID) injection 0.5 mg, Q4H PRN    LORazepam (ATIVAN) injection 1 mg, Once PRN    nicotine (NICODERM CQ) 14 mg/24hr TD 24 hr patch 1 patch, Daily    ondansetron (ZOFRAN) injection 4 mg, Q4H PRN    oxyCODONE (ROXICODONE) IR tablet 5 mg, Q4H PRN    pantoprazole (PROTONIX) EC tablet 40 mg, Early Morning    Suvorexant TABS 10 mg, HS    venlafaxine (EFFEXOR-XR) 24 hr capsule 225 mg, Daily  Prior to Admission Medications    Prescriptions Last Dose Informant Patient Reported? Taking?   Suvorexant 10 MG TABS 6/13/2025  No Yes   Sig: Take 1 tablet (10 mg total) by mouth daily at bedtime   albuterol (PROVENTIL HFA,VENTOLIN HFA) 90 mcg/act inhaler 6/13/2025  No Yes   Sig: Inhale 2 puffs every 6 (six) hours as needed for wheezing   budesonide-formoterol (SYMBICORT) 160-4.5 mcg/act inhaler 6/13/2025  No Yes   Sig: Use 2 puffs twice daily.  Rinse after use   busPIRone (BUSPAR) 10 mg tablet 6/13/2025  No Yes   Sig: Take 1.5 tablets (15 mg total) by mouth 3 (three) times a day   gabapentin (NEURONTIN) 400 mg capsule 6/13/2025  No Yes   Sig: TAKE 2 CAPSULES BY MOUTH ONCE DAILY IN THE MORNING AND 2 IN THE EVENING   lidocaine (Lidoderm) 5 % 6/13/2025  No Yes   Sig: Apply 1 patch topically over 12 hours daily Remove & Discard patch within 12 hours or as directed by MD   nicotine (NICODERM CQ) 21 mg/24 hr TD 24 hr patch Unknown  No No   Sig: Place 1 patch on the skin over 24 hours every 24 hours   omeprazole (PriLOSEC) 20 mg delayed release capsule 6/13/2025  No Yes   Sig: Take 1 capsule (20 mg total) by mouth daily before breakfast   venlafaxine (EFFEXOR-XR) 150 mg 24 hr capsule 6/13/2025  No Yes   Sig: Take 1 capsule (150 mg total) by mouth daily Take in combination with 75mg Effexor for total daily dose of (225mg)   venlafaxine (EFFEXOR-XR) 75 mg 24 hr capsule 6/13/2025  No Yes   Sig: Take 1 capsule (75 mg total) by mouth daily Take in combination with 150mg Effexor for total daily dose of (225mg)      Facility-Administered Medications: None     Chantix [varenicline], Ibuprofen, Lyrica [pregabalin], Penicillins, Sulfa antibiotics, and Sulfasalazine    Objective :  Temp:  [97.5 °F (36.4 °C)-98 °F (36.7 °C)] 97.5 °F (36.4 °C)  HR:  [102-121] 113  BP: (117-162)/() 147/103  Resp:  [16-28] 16  SpO2:  [92 %-100 %] 92 %  O2 Device: None (Room air)    Physical Exam  Constitutional:       General: She is not in acute distress.     Appearance: She  is normal weight. She is not ill-appearing or toxic-appearing.   HENT:      Head: Normocephalic.      Nose: Nose normal. No congestion.     Eyes:      General: No scleral icterus.      Cardiovascular:      Rate and Rhythm: Normal rate.      Pulses: Normal pulses.   Pulmonary:      Effort: Pulmonary effort is normal. No respiratory distress.   Abdominal:      General: Abdomen is flat. Bowel sounds are normal. There is no distension.      Palpations: Abdomen is soft.      Tenderness: There is abdominal tenderness (To palpation of the right upper quadrant and epigastric region). There is no guarding or rebound.     Musculoskeletal:      Cervical back: Normal range of motion.     Skin:     General: Skin is warm.      Capillary Refill: Capillary refill takes less than 2 seconds.      Coloration: Skin is not pale.     Neurological:      Mental Status: She is alert and oriented to person, place, and time. Mental status is at baseline.     Psychiatric:         Mood and Affect: Mood normal.         Behavior: Behavior normal.           Lab Results: I have reviewed the following results:CBC/BMP:   .     06/15/25  0441   WBC 10.15   HGB 11.8   HCT 37.6      SODIUM 136   K 3.4*      CO2 31   BUN 10   CREATININE 1.00   GLUC 74    , Creatinine Clearance: Estimated Creatinine Clearance: 43 mL/min (by C-G formula based on SCr of 1 mg/dL)., LFTs:   .     06/15/25  0441   AST 56*   ALT 62*   ALB 2.9*   TBILI 0.40   ALKPHOS 310*    , PTT/INR:  .     06/14/25  1415   PTT 24   INR 0.87    , Troponin,BNP:  .     06/14/25  1415 06/14/25  1417 06/14/25  1612   HSTNI0  --  8  --    HSTNI2  --   --  5   BNP 65  --   --                  [1]   Past Medical History:  Diagnosis Date    ARF (acute renal failure) (HCC) 9/19/2022    Aspiration pneumonitis (HCC) 10/1/2022    Bacteremia due to methicillin susceptible Staphylococcus aureus (MSSA) 9/27/2022    Chronic pain disorder     Depression     Foreign body of right eye 9/27/2023     "GERD (gastroesophageal reflux disease)     History of electroconvulsive therapy     Localized swelling of right upper extremity 2022    Low back pain     Self-injurious behavior     Suicide attempt (HCC)    [2]   Past Surgical History:  Procedure Laterality Date     SECTION      COLONOSCOPY      [place] LUMBAR PUNCTURE DIAGNOSTIC  2014    PANCREAS SURGERY      \"pseudocysts\" per patient's  Ricki Infante    [place] ESOPHAGOGASTRODUODENOSCOPY TRANSORAL DIAGNOSTIC N/A 4/10/2018    Procedure: EGD AND COLONOSCOPY;  Surgeon: Gaurang De La Paz MD;  [place] [facility];  Service: Gastroenterology   [3]   Social History  Tobacco Use    Smoking status: Some Days     Current packs/day: 0.50     Average packs/day: 0.5 packs/day for 35.0 years (17.5 ttl pk-yrs)     Types: Cigarettes    Smokeless tobacco: Never   Vaping Use    Vaping status: Never Used   Substance and Sexual Activity    Alcohol use: Not Currently     Comment: \"occasional glass of wine\"    Drug use: Not Currently     Types: Marijuana, Cocaine     Comment: medical, MARIJUANA    Sexual activity: Yes     Partners: Male     Birth control/protection: Post-menopausal     Comment: PT is    [4]   Social History  Tobacco Use    Smoking status: Some Days     Current packs/day: 0.50     Average packs/day: 0.5 packs/day for 35.0 years (17.5 ttl pk-yrs)     Types: Cigarettes    Smokeless tobacco: Never   Vaping Use    Vaping status: Never Used   Substance and Sexual Activity    Alcohol use: Not Currently     Comment: \"occasional glass of wine\"    Drug use: Not Currently     Types: Marijuana, Cocaine     Comment: medical, MARIJUANA    Sexual activity: Yes     Partners: Male     Birth control/protection: Post-menopausal     Comment: PT is      "

## 2025-06-15 NOTE — ASSESSMENT & PLAN NOTE
54-year-old female presenting with 2 weeks of on and off right sided abdominal pain with concern for cholecystitis and imaging findings of mildly dilated CBD    Intermittently tachycardic otherwise vitals stable       Plan  - Recommend GI evaluation given CBD dilation and transaminitis  - If indicated for cholecystectomy patient would need a percutaneous cholecystostomy tube as she is not a surgical candidate given duration of symptoms and would recommend IR evaluation at that point, could attempt tx w/ Abx before tube placement  - Okay for diet from surgery standpoint if not undergoing procedures  - Analgesia and antiemetic as needed  - DVT PPx while inpatient

## 2025-06-15 NOTE — PROGRESS NOTES
Progress Note - Surgery-General   Name: Nuris Infante 54 y.o. female I MRN: [id number]  Unit/Bed#: E5 -01 I Date of Admission: 6/14/2025   Date of Service: 6/15/2025 I Hospital Day: 1    Assessment & Plan  Dilated bile duct  54-year-old female presenting with 2 weeks of on and off right sided abdominal pain with concern for cholecystitis and imaging findings of mildly dilated CBD    Intermittently tachycardic otherwise vitals stable       Plan  - Recommend GI evaluation given CBD dilation and transaminitis  - If indicated for cholecystectomy patient would need a percutaneous cholecystostomy tube as she is not a surgical candidate given duration of symptoms and would recommend IR evaluation at that point, could attempt tx w/ Abx before tube placement  - Okay for diet from surgery standpoint if not undergoing procedures  - Analgesia and antiemetic as needed  - DVT PPx while inpatient  Recurrent major depressive disorder, in full remission (HCC)    COPD (chronic obstructive pulmonary disease) ([facility])    Tobacco abuse    Gastroesophageal reflux disease without esophagitis    Hyperglycemia    Peroneal neuropathy, right    Pulmonary nodule 1 cm or greater in diameter    Hyponatremia        Subjective   Patient seen and examined.  NAEON.  Patient still endorsing right flank abdominal pain, states pain is about the same as yesterday.  Having bowel function.    Objective :  Temp:  [97.5 °F (36.4 °C)-98.2 °F (36.8 °C)] 98.2 °F (36.8 °C)  HR:  [102-121] 113  BP: (117-162)/() 159/97  Resp:  [16-28] 16  SpO2:  [92 %-100 %] 92 %  O2 Device: None (Room air)    I/O         06/13 0701  06/14 0700 06/14 0701  06/15 0700 06/15 0701  06/16 0700    I.V. (mL/kg)  1750 (40.3)     IV Piggyback  100     Total Intake(mL/kg)  1850 (42.6)     Urine (mL/kg/hr)  800     Total Output  800     Net  +1050                  Physical Exam  General - no acute distress, responsive and cooperative  CV - warm, regular rate  Pulm - normal work  of breathing, no respiratory distress  Abd -soft, tender to right flank and RUQ, nondistended  Neuro - m/s grossly intact, cn grossly intact  Ext - moving all extremities       Lab Results: I have reviewed the following results:  Recent Labs     06/14/25  1415 06/14/25  1417 06/14/25  1612 06/15/25  0441   WBC 10.79*  --   --  10.15   HGB 12.9  --   --  11.8   HCT 38.5  --   --  37.6     --   --  342   SODIUM 129*  --   --  136   K 4.0  --   --  3.4*   CL 97  --   --  100   CO2 19*  --   --  31   BUN 12  --   --  10   CREATININE 1.07  --   --  1.00   GLUC 196*  --   --  74   *  --   --  56*   ALT 78*  --   --  62*   ALB 3.2*  --   --  2.9*   TBILI 0.59  --   --  0.40   ALKPHOS 377*  --   --  310*   PTT 24  --   --   --    INR 0.87  --   --   --    HSTNI0  --  8  --   --    HSTNI2  --   --  5  --    BNP 65  --   --   --    LACTICACID 3.4*  --  1.8  --

## 2025-06-15 NOTE — PROGRESS NOTES
Progress Note - Hospitalist   Name: Nuris Infante 54 y.o. female I MRN: [id number]  Unit/Bed#: E5 -01 I Date of Admission: 6/14/2025   Date of Service: 6/15/2025 I Hospital Day: 1    Assessment & Plan  Dilated bile duct  History of COPD mood disorder tobacco use and neuropathy who presents to the hospital for worsening right-sided abdominal/flank pains  In the ED underwent ultrasound and CT.  No evidence of cholecystitis but there are concerning findings for dilatation of CBD/pancreatic ducts  Will order MRI/MRCP and consult GI.  Low-fat diet    Results from last 7 days   Lab Units 06/15/25  0441 06/14/25  1415   AST U/L 56* 129*   ALT U/L 62* 78*   TOTAL BILIRUBIN mg/dL 0.40 0.59     Recurrent major depressive disorder, in full remission (HCC)  Follows with psychiatry as an outpatient  Mood stable  Continue venlafaxine and buspirone  COPD (chronic obstructive pulmonary disease) (HCC)  COPD without exacerbation.  Continue Symbicort and albuterol as needed  Pulmonary nodule 1 cm or greater in diameter  CT pe study w abdomen pelvis w contrast: Mixed solid and groundglass nodule within the right upper lobe is increased in size since 6/7/2023. Findings are suspicious for adenocarcinoma spectrum lesion.   Ambulatory referral placed for outpatient pulmonology evaluation  Hyponatremia  Given above pulmonary nodule finding may be SIADH  Start NSS.  Recheck labs in AM.    Results from last 7 days   Lab Units 06/15/25  0441 06/14/25  1415   SODIUM mmol/L 136 129*     Hyperglycemia  No history of diabetes mellitus.  Monitor with a.m. labs and recheck hemoglobin A1c    Results from last 7 days   Lab Units 06/15/25  0441 06/14/25  1415   GLUCOSE RANDOM mg/dL 74 196*   HEMOGLOBIN A1C % 6.2*  --      Tobacco abuse  Nicotine patch ordered during hospitalization  Gastroesophageal reflux disease without esophagitis  Continue PPI  Peroneal neuropathy, right  Continue gabapentin  Elevated liver function tests      VTE  Pharmacologic Prophylaxis: VTE Score: 3 Moderate Risk (Score 3-4) - Pharmacological DVT Prophylaxis Ordered: heparin.    Mobility:   Basic Mobility Inpatient Raw Score: 24  JH-HLM Goal: 8: Walk 250 feet or more  JH-HLM Achieved: 7: Walk 25 feet or more  JH-HLM Goal achieved. Continue to encourage appropriate mobility.    Patient Centered Rounds: I performed bedside rounds with nursing staff today.   Discussions with Specialists or Other Care Team Provider:     Education and Discussions with Family / Patient: Updated  (significant other) at bedside.    Current Length of Stay: 1 day(s)  Current Patient Status: Inpatient   Certification Statement: The patient will continue to require additional inpatient hospital stay due to mri  Discharge Plan: Anticipate discharge in 48 hrs to discharge location to be determined pending rehab evaluations.    Code Status: Level 1 - Full Code    Subjective   Patient denies any acute complaints    Objective :  Temp:  [97.5 °F (36.4 °C)-98.2 °F (36.8 °C)] 98.2 °F (36.8 °C)  HR:  [102-121] 113  BP: (117-162)/() 159/97  Resp:  [16-28] 16  SpO2:  [92 %-100 %] 92 %  O2 Device: None (Room air)    Body mass index is 15.44 kg/m².     Input and Output Summary (last 24 hours):     Intake/Output Summary (Last 24 hours) at 6/15/2025 1238  Last data filed at 6/15/2025 0442  Gross per 24 hour   Intake 1850 ml   Output 800 ml   Net 1050 ml       Physical Exam  Vitals and nursing note reviewed.   Constitutional:       General: She is not in acute distress.     Appearance: She is well-developed. She is not toxic-appearing or diaphoretic.   HENT:      Head: Normocephalic and atraumatic.     Eyes:      General: No scleral icterus.     Conjunctiva/sclera: Conjunctivae normal.       Cardiovascular:      Rate and Rhythm: Normal rate and regular rhythm.      Heart sounds: No murmur heard.     No friction rub. No gallop.   Pulmonary:      Effort: Pulmonary effort is normal. No respiratory  distress.      Breath sounds: Normal breath sounds. No stridor. No wheezing, rhonchi or rales.   Chest:      Chest wall: No tenderness.   Abdominal:      General: There is no distension.      Palpations: Abdomen is soft. There is no mass.      Tenderness: There is no abdominal tenderness. There is no guarding or rebound.      Hernia: No hernia is present.     Musculoskeletal:         General: No swelling or tenderness.      Cervical back: Neck supple.     Skin:     General: Skin is warm and dry.      Capillary Refill: Capillary refill takes less than 2 seconds.     Neurological:      Mental Status: She is alert and oriented to person, place, and time.     Psychiatric:         Mood and Affect: Mood normal.           Lines/Drains:              Lab Results: I have reviewed the following results:   Results from last 7 days   Lab Units 06/15/25  0441 06/14/25  1415   WBC Thousand/uL 10.15 10.79*   HEMOGLOBIN g/dL 11.8 12.9   HEMATOCRIT % 37.6 38.5   PLATELETS Thousands/uL 342 372   SEGS PCT %  --  71   LYMPHO PCT %  --  24   MONO PCT %  --  4   EOS PCT %  --  0     Results from last 7 days   Lab Units 06/15/25  0441   SODIUM mmol/L 136   POTASSIUM mmol/L 3.4*   CHLORIDE mmol/L 100   CO2 mmol/L 31   BUN mg/dL 10   CREATININE mg/dL 1.00   ANION GAP mmol/L 5   CALCIUM mg/dL 8.3*   ALBUMIN g/dL 2.9*   TOTAL BILIRUBIN mg/dL 0.40   ALK PHOS U/L 310*   ALT U/L 62*   AST U/L 56*   GLUCOSE RANDOM mg/dL 74     Results from last 7 days   Lab Units 06/14/25  1415   INR  0.87         Results from last 7 days   Lab Units 06/15/25  0441   HEMOGLOBIN A1C % 6.2*     Results from last 7 days   Lab Units 06/14/25  1612 06/14/25  1415   LACTIC ACID mmol/L 1.8 3.4*   PROCALCITONIN ng/ml  --  0.15       Recent Cultures (last 7 days):   Results from last 7 days   Lab Units 06/14/25  1451   BLOOD CULTURE  Received in Microbiology Lab. Culture in Progress.  Received in Microbiology Lab. Culture in Progress.       Imaging Results Review: No  pertinent imaging studies reviewed.  Other Study Results Review: No additional pertinent studies reviewed.    Last 24 Hours Medication List:     Current Facility-Administered Medications:     acetaminophen (TYLENOL) tablet 650 mg, Q4H PRN    albuterol (PROVENTIL HFA,VENTOLIN HFA) inhaler 2 puff, Q6H PRN    budesonide-formoterol (SYMBICORT) 160-4.5 mcg/act inhaler 2 puff, BID    busPIRone (BUSPAR) tablet 15 mg, TID    gabapentin (NEURONTIN) capsule 800 mg, Q12H CHOCO    heparin (porcine) subcutaneous injection 5,000 Units, Q8H CHOCO    HYDROmorphone (DILAUDID) injection 0.5 mg, Q4H PRN    LORazepam (ATIVAN) injection 1 mg, Once PRN    nicotine (NICODERM CQ) 14 mg/24hr TD 24 hr patch 1 patch, Daily    ondansetron (ZOFRAN) injection 4 mg, Q4H PRN    oxyCODONE (ROXICODONE) IR tablet 5 mg, Q4H PRN    pantoprazole (PROTONIX) EC tablet 40 mg, Early Morning    Suvorexant TABS 10 mg, HS    venlafaxine (EFFEXOR-XR) 24 hr capsule 225 mg, Daily    Administrative Statements   Today, Patient Was Seen By: Morales Alas DO      **Please Note: This note may have been constructed using a voice recognition system.**

## 2025-06-15 NOTE — ASSESSMENT & PLAN NOTE
CBD dilated to 10 mm on right upper quadrant ultrasound.  MRI/MRCP ordered to further evaluate see additional plan as above

## 2025-06-15 NOTE — ASSESSMENT & PLAN NOTE
No history of diabetes mellitus.  Monitor with a.m. labs and recheck hemoglobin A1c    Results from last 7 days   Lab Units 06/15/25  0441 06/14/25  1415   GLUCOSE RANDOM mg/dL 74 196*   HEMOGLOBIN A1C % 6.2*  --

## 2025-06-15 NOTE — H&P (VIEW-ONLY)
Consultation - Gastroenterology   Name: Nuris Infante 54 y.o. female I MRN: [id number]  Unit/Bed#: E5 -01 I Date of Admission: 6/14/2025   Date of Service: 6/15/2025 I Hospital Day: 1       Inpatient consult to gastroenterology     Date/Time  6/15/2025 7:48 AM     Performed by  Lexi Reese DO   Authorized by  Atul Adan DO           Physician Requesting Evaluation: Morales Alas DO   Reason for Evaluation / Principal Problem: Dilated CBD    Assessment & Plan  Elevated liver function tests  54-year-old female with history of COPD, depression, GERD, tobacco use, and prior pancreatic pseudocyst requiring surgical intervention and midline laparotomy incision.  Patient presenting with a few week history of epigastric and right upper quadrant abdominal pain.  Associated with occasional nausea and.  No fever/chills.  Patient has been taking around-the-clock Tylenol for pain relief.  On arrival, patient hemodynamically stable but tachycardic.  Labs demonstrating elevated liver chemistries including AST//78, alkaline phosphatase 377, and total bilirubin 0.59.  INR 0.87.  Lipase 168.  WBC 10.7 with lactic acidosis of 3.4 which corrected with IV fluid administration.  Pattern of liver chemistry mixed picture.  However, patient did have right upper quadrant ultrasound demonstrating a dilated gallbladder and CBD measuring 10 mm.  No intrahepatic dilation seen or evidence of choledocholithiasis.  MRI/MRCP now pending given patient's presenting symptoms and normal bilirubin yet dilated CBD.  Would also consider DILI in differential and will plan to complete serologic workup given mixed pattern of elevation and history of elevated liver enzymes in the past.  At this time, would recommend conservative management while awaiting MRI results.  From GI standpoint, no clinical concern for cholangitis to warrant antibiotics.  However will defer need to antibiotics to general surgery team if concern for  cholecystitis.    Plan:  Acetaminophen level and acute hepatitis ordered along with complete serologic liver workup  MRI/MRCP given dilated CBD ordered and pending completion  No GI concern for cholangitis, will defer antibiotics to surgery team if concern for cholecystitis  Continue to monitor and trend LFTs to ensure improvement  Avoid use of hepatotoxic agents  General Surgery consulted and appreciate recommendations  Additional pain and symptom management per primary team  Dilated bile duct  CBD dilated to 10 mm on right upper quadrant ultrasound.  MRI/MRCP ordered to further evaluate see additional plan as above  Gastroesophageal reflux disease without esophagitis  History of GERD.  Symptoms currently controlled at this time.  Okay to continue on Protonix 40 mg daily while inpatient.    Please contact the SecureChat role for the Gastroenterology service with any questions/concerns.    History of Present Illness   HPI:  Nuris Infante is a 54 y.o. female with a PMHx of COPD, depression, GERD, prior pancreatic pseudocyst requiring surgical intervention and midline laparotomy incision and tobacco use who presented to [facility] ED with the complaint of worsening abdominal pain. Pain primarily on the right side and had been present intermittently over the last month but has now started to become more consistent. Pain associated with N/V but no fever/chills and pain is slightly worsened by oral intake.  She states that she has been taking Tylenol multiple times a day (2 tablets every 6 hours) to help with the pain.  She explains that this did provide her with pain relief previously but more recently it had not been helping take away the pain leading to her presentation.    At time of ED arrival, patient hemodynamically stable and febrile but tachycardic. Labs demonstrating elevated LFTS: AST//78, , and TB 0.59. INR 0.87.  Lipase 168.  Patient has had elevations in LFTs in the past,primarily hepatocellular  with transaminase elevations. WBC 10.79 with a lactic acid of 3.4. Labs otherwise unremarkable.RUQ US revealed a dilated gallbladder with no discrete wall thickening. Dilated CBD also noted and measuring 10 mm but no intrahepatic dilation.     Patient seen and examined at bedside.  Sitting in bed comfortably in no acute distress or visible discomfort.  Does admit to improvement in abdominal pain since admission.  However, pain has continued to persist.  Pain primarily in right upper quadrant.  Currently denies any nausea/vomiting.  Admits to current tobacco use.  No active alcohol use.  No additional medications outside of around-the-clock Tylenol.     Review of Systems  Medical History Review: I have reviewed the patient's PMH, PSH, Social History, Family History, Meds, and Allergies   Historical Information   Past Medical History[1]  Past Surgical History[2]  Social History[3]  E-Cigarette/Vaping    E-Cigarette Use Never User      E-Cigarette/Vaping Substances    Nicotine No     THC No     CBD No     Flavoring No     Other No     Unknown No      Family history non-contributory  Social History[4]    Current Facility-Administered Medications:     acetaminophen (TYLENOL) tablet 650 mg, Q4H PRN    albuterol (PROVENTIL HFA,VENTOLIN HFA) inhaler 2 puff, Q6H PRN    budesonide-formoterol (SYMBICORT) 160-4.5 mcg/act inhaler 2 puff, BID    busPIRone (BUSPAR) tablet 15 mg, TID    gabapentin (NEURONTIN) capsule 800 mg, Q12H CHOCO    heparin (porcine) subcutaneous injection 5,000 Units, Q8H CHOCO    HYDROmorphone (DILAUDID) injection 0.5 mg, Q4H PRN    LORazepam (ATIVAN) injection 1 mg, Once PRN    nicotine (NICODERM CQ) 14 mg/24hr TD 24 hr patch 1 patch, Daily    ondansetron (ZOFRAN) injection 4 mg, Q4H PRN    oxyCODONE (ROXICODONE) IR tablet 5 mg, Q4H PRN    pantoprazole (PROTONIX) EC tablet 40 mg, Early Morning    Suvorexant TABS 10 mg, HS    venlafaxine (EFFEXOR-XR) 24 hr capsule 225 mg, Daily  Prior to Admission Medications    Prescriptions Last Dose Informant Patient Reported? Taking?   Suvorexant 10 MG TABS 6/13/2025  No Yes   Sig: Take 1 tablet (10 mg total) by mouth daily at bedtime   albuterol (PROVENTIL HFA,VENTOLIN HFA) 90 mcg/act inhaler 6/13/2025  No Yes   Sig: Inhale 2 puffs every 6 (six) hours as needed for wheezing   budesonide-formoterol (SYMBICORT) 160-4.5 mcg/act inhaler 6/13/2025  No Yes   Sig: Use 2 puffs twice daily.  Rinse after use   busPIRone (BUSPAR) 10 mg tablet 6/13/2025  No Yes   Sig: Take 1.5 tablets (15 mg total) by mouth 3 (three) times a day   gabapentin (NEURONTIN) 400 mg capsule 6/13/2025  No Yes   Sig: TAKE 2 CAPSULES BY MOUTH ONCE DAILY IN THE MORNING AND 2 IN THE EVENING   lidocaine (Lidoderm) 5 % 6/13/2025  No Yes   Sig: Apply 1 patch topically over 12 hours daily Remove & Discard patch within 12 hours or as directed by MD   nicotine (NICODERM CQ) 21 mg/24 hr TD 24 hr patch Unknown  No No   Sig: Place 1 patch on the skin over 24 hours every 24 hours   omeprazole (PriLOSEC) 20 mg delayed release capsule 6/13/2025  No Yes   Sig: Take 1 capsule (20 mg total) by mouth daily before breakfast   venlafaxine (EFFEXOR-XR) 150 mg 24 hr capsule 6/13/2025  No Yes   Sig: Take 1 capsule (150 mg total) by mouth daily Take in combination with 75mg Effexor for total daily dose of (225mg)   venlafaxine (EFFEXOR-XR) 75 mg 24 hr capsule 6/13/2025  No Yes   Sig: Take 1 capsule (75 mg total) by mouth daily Take in combination with 150mg Effexor for total daily dose of (225mg)      Facility-Administered Medications: None     Chantix [varenicline], Ibuprofen, Lyrica [pregabalin], Penicillins, Sulfa antibiotics, and Sulfasalazine    Objective :  Temp:  [97.5 °F (36.4 °C)-98 °F (36.7 °C)] 97.5 °F (36.4 °C)  HR:  [102-121] 113  BP: (117-162)/() 147/103  Resp:  [16-28] 16  SpO2:  [92 %-100 %] 92 %  O2 Device: None (Room air)    Physical Exam  Constitutional:       General: She is not in acute distress.     Appearance: She  is normal weight. She is not ill-appearing or toxic-appearing.   HENT:      Head: Normocephalic.      Nose: Nose normal. No congestion.     Eyes:      General: No scleral icterus.      Cardiovascular:      Rate and Rhythm: Normal rate.      Pulses: Normal pulses.   Pulmonary:      Effort: Pulmonary effort is normal. No respiratory distress.   Abdominal:      General: Abdomen is flat. Bowel sounds are normal. There is no distension.      Palpations: Abdomen is soft.      Tenderness: There is abdominal tenderness (To palpation of the right upper quadrant and epigastric region). There is no guarding or rebound.     Musculoskeletal:      Cervical back: Normal range of motion.     Skin:     General: Skin is warm.      Capillary Refill: Capillary refill takes less than 2 seconds.      Coloration: Skin is not pale.     Neurological:      Mental Status: She is alert and oriented to person, place, and time. Mental status is at baseline.     Psychiatric:         Mood and Affect: Mood normal.         Behavior: Behavior normal.           Lab Results: I have reviewed the following results:CBC/BMP:   .     06/15/25  0441   WBC 10.15   HGB 11.8   HCT 37.6      SODIUM 136   K 3.4*      CO2 31   BUN 10   CREATININE 1.00   GLUC 74    , Creatinine Clearance: Estimated Creatinine Clearance: 43 mL/min (by C-G formula based on SCr of 1 mg/dL)., LFTs:   .     06/15/25  0441   AST 56*   ALT 62*   ALB 2.9*   TBILI 0.40   ALKPHOS 310*    , PTT/INR:  .     06/14/25  1415   PTT 24   INR 0.87    , Troponin,BNP:  .     06/14/25  1415 06/14/25  1417 06/14/25  1612   HSTNI0  --  8  --    HSTNI2  --   --  5   BNP 65  --   --                  [1]   Past Medical History:  Diagnosis Date    ARF (acute renal failure) (HCC) 9/19/2022    Aspiration pneumonitis (HCC) 10/1/2022    Bacteremia due to methicillin susceptible Staphylococcus aureus (MSSA) 9/27/2022    Chronic pain disorder     Depression     Foreign body of right eye 9/27/2023     "GERD (gastroesophageal reflux disease)     History of electroconvulsive therapy     Localized swelling of right upper extremity 2022    Low back pain     Self-injurious behavior     Suicide attempt (HCC)    [2]   Past Surgical History:  Procedure Laterality Date     SECTION      COLONOSCOPY      FL LUMBAR PUNCTURE DIAGNOSTIC  2014    PANCREAS SURGERY      \"pseudocysts\" per patient's  Ricki Infante    DC ESOPHAGOGASTRODUODENOSCOPY TRANSORAL DIAGNOSTIC N/A 4/10/2018    Procedure: EGD AND COLONOSCOPY;  Surgeon: Gaurang De La Paz MD;  [place] [facility];  Service: Gastroenterology   [3]   Social History  Tobacco Use    Smoking status: Some Days     Current packs/day: 0.50     Average packs/day: 0.5 packs/day for 35.0 years (17.5 ttl pk-yrs)     Types: Cigarettes    Smokeless tobacco: Never   Vaping Use    Vaping status: Never Used   Substance and Sexual Activity    Alcohol use: Not Currently     Comment: \"occasional glass of wine\"    Drug use: Not Currently     Types: Marijuana, Cocaine     Comment: medical, MARIJUANA    Sexual activity: Yes     Partners: Male     Birth control/protection: Post-menopausal     Comment: PT is    [4]   Social History  Tobacco Use    Smoking status: Some Days     Current packs/day: 0.50     Average packs/day: 0.5 packs/day for 35.0 years (17.5 ttl pk-yrs)     Types: Cigarettes    Smokeless tobacco: Never   Vaping Use    Vaping status: Never Used   Substance and Sexual Activity    Alcohol use: Not Currently     Comment: \"occasional glass of wine\"    Drug use: Not Currently     Types: Marijuana, Cocaine     Comment: medical, MARIJUANA    Sexual activity: Yes     Partners: Male     Birth control/protection: Post-menopausal     Comment: PT is      "

## 2025-06-15 NOTE — PLAN OF CARE
Problem: PAIN - ADULT  Goal: Verbalizes/displays adequate comfort level or baseline comfort level  Description: Interventions:  - Encourage patient to monitor pain and request assistance  - Assess pain using appropriate pain scale  - Administer analgesics as ordered based on type and severity of pain and evaluate response  - Implement non-pharmacological measures as appropriate and evaluate response  - Consider cultural and social influences on pain and pain management  - Notify physician/advanced practitioner if interventions unsuccessful or patient reports new pain  - Educate patient/family on pain management process including their role and importance of  reporting pain   - Provide non-pharmacologic/complimentary pain relief interventions  Outcome: Progressing     Problem: INFECTION - ADULT  Goal: Absence or prevention of progression during hospitalization  Description: INTERVENTIONS:  - Assess and monitor for signs and symptoms of infection  - Monitor lab/diagnostic results  - Monitor all insertion sites, i.e. indwelling lines, tubes, and drains  - Monitor endotracheal if appropriate and nasal secretions for changes in amount and color  - [place] appropriate cooling/warming therapies per order  - Administer medications as ordered  - Instruct and encourage patient and family to use good hand hygiene technique  - Identify and instruct in appropriate isolation precautions for identified infection/condition  Outcome: Progressing  Goal: Absence of fever/infection during neutropenic period  Description: INTERVENTIONS:  - Monitor WBC  - Perform strict hand hygiene  - Limit to healthy visitors only  - No plants, dried, fresh or silk flowers with fuller in patient room  Outcome: Progressing     Problem: SAFETY ADULT  Goal: Patient will remain free of falls  Description: INTERVENTIONS:  - Educate patient/family on patient safety including physical limitations  - Instruct patient to call for assistance with activity   -  Consider consulting OT/PT to assist with strengthening/mobility based on AM PAC & JH-HLM score  - Consult OT/PT to assist with strengthening/mobility   - Keep Call bell within reach  - Keep bed low and locked with side rails adjusted as appropriate  - Keep care items and personal belongings within reach  - Initiate and maintain comfort rounds  - Make Fall Risk Sign visible to staff  - Offer Toileting every 2 Hours, in advance of need  - Initiate/Maintain  alarm  - Obtain necessary fall risk management equipment:  socks  - Apply yellow socks and bracelet for high fall risk patients  - Consider moving patient to room near nurses station  Outcome: Progressing  Goal: Maintain or return to baseline ADL function  Description: INTERVENTIONS:  -  Assess patient's ability to carry out ADLs; assess patient's baseline for ADL function and identify physical deficits which impact ability to perform ADLs (bathing, care of mouth/teeth, toileting, grooming, dressing, etc.)  - Assess/evaluate cause of self-care deficits   - Assess range of motion  - Assess patient's mobility; develop plan if impaired  - Assess patient's need for assistive devices and provide as appropriate  - Encourage maximum independence but intervene and supervise when necessary  - Involve family in performance of ADLs  - Assess for home care needs following discharge   - Consider OT consult to assist with ADL evaluation and planning for discharge  - Provide patient education as appropriate  - Monitor functional capacity and physical performance, use of AM PAC & JH-HLM   - Monitor gait, balance and fatigue with ambulation    Outcome: Progressing  Problem: DISCHARGE PLANNING  Goal: Discharge to home or other facility with appropriate resources  Description: INTERVENTIONS:  - Identify barriers to discharge w/patient and caregiver  - Arrange for needed discharge resources and transportation as appropriate  - Identify discharge learning needs (meds, wound care,  etc.)  - Arrange for interpretive services to assist at discharge as needed  - Refer to Case Management Department for coordinating discharge planning if the patient needs post-hospital services based on physician/advanced practitioner order or complex needs related to functional status, cognitive ability, or social support system  Outcome: Progressing     Problem: Knowledge Deficit  Goal: Patient/family/caregiver demonstrates understanding of disease process, treatment plan, medications, and discharge instructions  Description: Complete learning assessment and assess knowledge base.  Interventions:  - Provide teaching at level of understanding  - Provide teaching via preferred learning methods  Outcome: Progressing     Problem: Nutrition/Hydration-ADULT  Goal: Nutrient/Hydration intake appropriate for improving, restoring or maintaining nutritional needs  Description: Monitor and assess patient's nutrition/hydration status for malnutrition. Collaborate with interdisciplinary team and initiate plan and interventions as ordered.  Monitor patient's weight and dietary intake as ordered or per policy. Utilize nutrition screening tool and intervene as necessary. Determine patient's food preferences and provide high-protein, high-caloric foods as appropriate.     INTERVENTIONS:  - Monitor oral intake, urinary output, labs, and treatment plans  - Assess nutrition and hydration status and recommend course of action  - Evaluate amount of meals eaten  - Assist patient with eating if necessary   - Allow adequate time for meals  - Recommend/ encourage appropriate diets, oral nutritional supplements, and vitamin/mineral supplements  - Order, calculate, and assess calorie counts as needed  - Recommend, monitor, and adjust tube feedings and TPN/PPN based on assessed needs  - Assess need for intravenous fluids  - Provide specific nutrition/hydration education as appropriate  - Include patient/family/caregiver in decisions related  to nutrition  Outcome: Progressing

## 2025-06-15 NOTE — PLAN OF CARE
Problem: PAIN - ADULT  Goal: Verbalizes/displays adequate comfort level or baseline comfort level  Description: Interventions:  - Encourage patient to monitor pain and request assistance  - Assess pain using appropriate pain scale  - Administer analgesics as ordered based on type and severity of pain and evaluate response  - Implement non-pharmacological measures as appropriate and evaluate response  - Consider cultural and social influences on pain and pain management  - Notify physician/advanced practitioner if interventions unsuccessful or patient reports new pain  - Educate patient/family on pain management process including their role and importance of  reporting pain   - Provide non-pharmacologic/complimentary pain relief interventions  Outcome: Progressing     Problem: INFECTION - ADULT  Goal: Absence or prevention of progression during hospitalization  Description: INTERVENTIONS:  - Assess and monitor for signs and symptoms of infection  - Monitor lab/diagnostic results  - Monitor all insertion sites, i.e. indwelling lines, tubes, and drains  - Monitor endotracheal if appropriate and nasal secretions for changes in amount and color  - [place] appropriate cooling/warming therapies per order  - Administer medications as ordered  - Instruct and encourage patient and family to use good hand hygiene technique  - Identify and instruct in appropriate isolation precautions for identified infection/condition  Outcome: Progressing  Goal: Absence of fever/infection during neutropenic period  Description: INTERVENTIONS:  - Monitor WBC  - Perform strict hand hygiene  - Limit to healthy visitors only  - No plants, dried, fresh or silk flowers with fuller in patient room  Outcome: Progressing     Problem: SAFETY ADULT  Goal: Patient will remain free of falls  Description: INTERVENTIONS:  - Educate patient/family on patient safety including physical limitations  - Instruct patient to call for assistance with activity   -  Consider consulting OT/PT to assist with strengthening/mobility based on AM PAC & JH-HLM score  - Consult OT/PT to assist with strengthening/mobility   - Keep Call bell within reach  - Keep bed low and locked with side rails adjusted as appropriate  - Keep care items and personal belongings within reach  - Initiate and maintain comfort rounds  - Obtain necessary fall risk management equipment:   - Apply yellow socks and bracelet for high fall risk patients  - Consider moving patient to room near nurses station  Outcome: Progressing  Goal: Maintain or return to baseline ADL function  Description: INTERVENTIONS:  -  Assess patient's ability to carry out ADLs; assess patient's baseline for ADL function and identify physical deficits which impact ability to perform ADLs (bathing, care of mouth/teeth, toileting, grooming, dressing, etc.)  - Assess/evaluate cause of self-care deficits   - Assess range of motion  - Assess patient's mobility; develop plan if impaired  - Assess patient's need for assistive devices and provide as appropriate  - Encourage maximum independence but intervene and supervise when necessary  - Involve family in performance of ADLs  - Assess for home care needs following discharge   - Consider OT consult to assist with ADL evaluation and planning for discharge  - Provide patient education as appropriate  - Monitor functional capacity and physical performance, use of AM PAC & JH-HLM   - Monitor gait, balance and fatigue with ambulation    Outcome: Progressing  Goal: Maintains/Returns to pre admission functional level  Description: INTERVENTIONS:  - Perform AM-PAC 6 Click Basic Mobility/ Daily Activity assessment daily.  - Set and communicate daily mobility goal to care team and patient/family/caregiver.   - Collaborate with rehabilitation services on mobility goals if consulted  - Out of bed for toileting  - Record patient progress and toleration of activity level   Outcome: Progressing     Problem:  DISCHARGE PLANNING  Goal: Discharge to home or other facility with appropriate resources  Description: INTERVENTIONS:  - Identify barriers to discharge w/patient and caregiver  - Arrange for needed discharge resources and transportation as appropriate  - Identify discharge learning needs (meds, wound care, etc.)  - Arrange for interpretive services to assist at discharge as needed  - Refer to Case Management Department for coordinating discharge planning if the patient needs post-hospital services based on physician/advanced practitioner order or complex needs related to functional status, cognitive ability, or social support system  Outcome: Progressing     Problem: Knowledge Deficit  Goal: Patient/family/caregiver demonstrates understanding of disease process, treatment plan, medications, and discharge instructions  Description: Complete learning assessment and assess knowledge base.  Interventions:  - Provide teaching at level of understanding  - Provide teaching via preferred learning methods  Outcome: Progressing     Problem: Nutrition/Hydration-ADULT  Goal: Nutrient/Hydration intake appropriate for improving, restoring or maintaining nutritional needs  Description: Monitor and assess patient's nutrition/hydration status for malnutrition. Collaborate with interdisciplinary team and initiate plan and interventions as ordered.  Monitor patient's weight and dietary intake as ordered or per policy. Utilize nutrition screening tool and intervene as necessary. Determine patient's food preferences and provide high-protein, high-caloric foods as appropriate.     INTERVENTIONS:  - Monitor oral intake, urinary output, labs, and treatment plans  - Assess nutrition and hydration status and recommend course of action  - Evaluate amount of meals eaten  - Assist patient with eating if necessary   - Allow adequate time for meals  - Recommend/ encourage appropriate diets, oral nutritional supplements, and vitamin/mineral  supplements  - Order, calculate, and assess calorie counts as needed  - Recommend, monitor, and adjust tube feedings and TPN/PPN based on assessed needs  - Assess need for intravenous fluids  - Provide specific nutrition/hydration education as appropriate  - Include patient/family/caregiver in decisions related to nutrition  Outcome: Progressing

## 2025-06-15 NOTE — ASSESSMENT & PLAN NOTE
54-year-old female with history of COPD, depression, GERD, tobacco use, and prior pancreatic pseudocyst requiring surgical intervention and midline laparotomy incision.  Patient presenting with a few week history of epigastric and right upper quadrant abdominal pain.  Associated with occasional nausea and.  No fever/chills.  Patient has been taking around-the-clock Tylenol for pain relief.  On arrival, patient hemodynamically stable but tachycardic.  Labs demonstrating elevated liver chemistries including AST//78, alkaline phosphatase 377, and total bilirubin 0.59.  INR 0.87.  Lipase 168.  WBC 10.7 with lactic acidosis of 3.4 which corrected with IV fluid administration.  Pattern of liver chemistry mixed picture.  However, patient did have right upper quadrant ultrasound demonstrating a dilated gallbladder and CBD measuring 10 mm.  No intrahepatic dilation seen or evidence of choledocholithiasis.  MRI/MRCP now pending given patient's presenting symptoms and normal bilirubin yet dilated CBD.  Would also consider DILI in differential and will plan to complete serologic workup given mixed pattern of elevation and history of elevated liver enzymes in the past.  At this time, would recommend conservative management while awaiting MRI results.  From GI standpoint, no clinical concern for cholangitis to warrant antibiotics.  However will defer need to antibiotics to general surgery team if concern for cholecystitis.    Plan:  Acetaminophen level and acute hepatitis ordered along with complete serologic liver workup  MRI/MRCP given dilated CBD ordered and pending completion  No GI concern for cholangitis, will defer antibiotics to surgery team if concern for cholecystitis  Continue to monitor and trend LFTs to ensure improvement  Avoid use of hepatotoxic agents  General Surgery consulted and appreciate recommendations  Additional pain and symptom management per primary team

## 2025-06-16 LAB
A1AT SERPL-MCNC: 150 MG/DL (ref 101–187)
ACTIN IGG SERPL-ACNC: 3 UNITS (ref 0–19)
ALBUMIN SERPL BCG-MCNC: 2.8 G/DL (ref 3.5–5)
ALP SERPL-CCNC: 277 U/L (ref 34–104)
ALT SERPL W P-5'-P-CCNC: 50 U/L (ref 7–52)
ANION GAP SERPL CALCULATED.3IONS-SCNC: 5 MMOL/L (ref 4–13)
AST SERPL W P-5'-P-CCNC: 56 U/L (ref 13–39)
BASOPHILS # BLD AUTO: 0.09 THOUSANDS/ÂΜL (ref 0–0.1)
BASOPHILS NFR BLD AUTO: 1 % (ref 0–1)
BILIRUB DIRECT SERPL-MCNC: 0.07 MG/DL (ref 0–0.2)
BILIRUB SERPL-MCNC: 0.32 MG/DL (ref 0.2–1)
BUN SERPL-MCNC: 11 MG/DL (ref 5–25)
CALCIUM SERPL-MCNC: 8.7 MG/DL (ref 8.4–10.2)
CERULOPLASMIN SERPL-MCNC: 23.6 MG/DL (ref 19–39)
CHLORIDE SERPL-SCNC: 100 MMOL/L (ref 96–108)
CO2 SERPL-SCNC: 32 MMOL/L (ref 21–32)
CREAT SERPL-MCNC: 0.86 MG/DL (ref 0.6–1.3)
EOSINOPHIL # BLD AUTO: 0.18 THOUSAND/ÂΜL (ref 0–0.61)
EOSINOPHIL NFR BLD AUTO: 2 % (ref 0–6)
ERYTHROCYTE [DISTWIDTH] IN BLOOD BY AUTOMATED COUNT: 13.2 % (ref 11.6–15.1)
GFR SERPL CREATININE-BSD FRML MDRD: 75 ML/MIN/1.73SQ M
GLUCOSE SERPL-MCNC: 131 MG/DL (ref 65–140)
HCT VFR BLD AUTO: 32.4 % (ref 34.8–46.1)
HGB BLD-MCNC: 10.9 G/DL (ref 11.5–15.4)
IMM GRANULOCYTES # BLD AUTO: 0.02 THOUSAND/UL (ref 0–0.2)
IMM GRANULOCYTES NFR BLD AUTO: 0 % (ref 0–2)
INR PPP: 0.84 (ref 0.85–1.19)
LYMPHOCYTES # BLD AUTO: 3.04 THOUSANDS/ÂΜL (ref 0.6–4.47)
LYMPHOCYTES NFR BLD AUTO: 35 % (ref 14–44)
MCH RBC QN AUTO: 34.2 PG (ref 26.8–34.3)
MCHC RBC AUTO-ENTMCNC: 33.6 G/DL (ref 31.4–37.4)
MCV RBC AUTO: 102 FL (ref 82–98)
MITOCHONDRIA M2 IGG SER-ACNC: <20 UNITS (ref 0–20)
MONOCYTES # BLD AUTO: 0.44 THOUSAND/ÂΜL (ref 0.17–1.22)
MONOCYTES NFR BLD AUTO: 5 % (ref 4–12)
NEUTROPHILS # BLD AUTO: 4.93 THOUSANDS/ÂΜL (ref 1.85–7.62)
NEUTS SEG NFR BLD AUTO: 57 % (ref 43–75)
NRBC BLD AUTO-RTO: 0 /100 WBCS
PLATELET # BLD AUTO: 296 THOUSANDS/UL (ref 149–390)
PMV BLD AUTO: 10.1 FL (ref 8.9–12.7)
POTASSIUM SERPL-SCNC: 3.6 MMOL/L (ref 3.5–5.3)
PROT SERPL-MCNC: 5.2 G/DL (ref 6.4–8.4)
PROTHROMBIN TIME: 11.7 SECONDS (ref 12.3–15)
RBC # BLD AUTO: 3.19 MILLION/UL (ref 3.81–5.12)
SODIUM SERPL-SCNC: 137 MMOL/L (ref 135–147)
WBC # BLD AUTO: 8.7 THOUSAND/UL (ref 4.31–10.16)

## 2025-06-16 PROCEDURE — 99232 SBSQ HOSP IP/OBS MODERATE 35: CPT | Performed by: SURGERY

## 2025-06-16 PROCEDURE — 80048 BASIC METABOLIC PNL TOTAL CA: CPT | Performed by: INTERNAL MEDICINE

## 2025-06-16 PROCEDURE — 99232 SBSQ HOSP IP/OBS MODERATE 35: CPT | Performed by: INTERNAL MEDICINE

## 2025-06-16 PROCEDURE — 85610 PROTHROMBIN TIME: CPT | Performed by: INTERNAL MEDICINE

## 2025-06-16 PROCEDURE — 85025 COMPLETE CBC W/AUTO DIFF WBC: CPT | Performed by: INTERNAL MEDICINE

## 2025-06-16 PROCEDURE — 80076 HEPATIC FUNCTION PANEL: CPT | Performed by: INTERNAL MEDICINE

## 2025-06-16 RX ORDER — OXYCODONE HYDROCHLORIDE 5 MG/1
5 TABLET ORAL EVERY 6 HOURS PRN
Refills: 0 | Status: DISCONTINUED | OUTPATIENT
Start: 2025-06-16 | End: 2025-06-17

## 2025-06-16 RX ORDER — HYDROMORPHONE HCL IN WATER/PF 6 MG/30 ML
0.2 PATIENT CONTROLLED ANALGESIA SYRINGE INTRAVENOUS EVERY 6 HOURS PRN
Status: DISCONTINUED | OUTPATIENT
Start: 2025-06-16 | End: 2025-06-17

## 2025-06-16 RX ADMIN — HEPARIN SODIUM 5000 UNITS: 5000 INJECTION INTRAVENOUS; SUBCUTANEOUS at 14:26

## 2025-06-16 RX ADMIN — OXYCODONE HYDROCHLORIDE 5 MG: 5 TABLET ORAL at 14:27

## 2025-06-16 RX ADMIN — HYDROMORPHONE HYDROCHLORIDE 0.2 MG: 0.2 INJECTION, SOLUTION INTRAMUSCULAR; INTRAVENOUS; SUBCUTANEOUS at 16:13

## 2025-06-16 RX ADMIN — BUDESONIDE AND FORMOTEROL FUMARATE DIHYDRATE 2 PUFF: 160; 4.5 AEROSOL RESPIRATORY (INHALATION) at 09:54

## 2025-06-16 RX ADMIN — BUSPIRONE HYDROCHLORIDE 15 MG: 10 TABLET ORAL at 20:41

## 2025-06-16 RX ADMIN — OXYCODONE HYDROCHLORIDE 5 MG: 5 TABLET ORAL at 20:41

## 2025-06-16 RX ADMIN — HEPARIN SODIUM 5000 UNITS: 5000 INJECTION INTRAVENOUS; SUBCUTANEOUS at 22:15

## 2025-06-16 RX ADMIN — HYDROMORPHONE HYDROCHLORIDE 0.5 MG: 1 INJECTION, SOLUTION INTRAMUSCULAR; INTRAVENOUS; SUBCUTANEOUS at 10:52

## 2025-06-16 RX ADMIN — BUSPIRONE HYDROCHLORIDE 15 MG: 10 TABLET ORAL at 16:13

## 2025-06-16 RX ADMIN — BUSPIRONE HYDROCHLORIDE 15 MG: 10 TABLET ORAL at 08:12

## 2025-06-16 RX ADMIN — PANTOPRAZOLE SODIUM 40 MG: 40 TABLET, DELAYED RELEASE ORAL at 05:54

## 2025-06-16 RX ADMIN — OXYCODONE HYDROCHLORIDE 5 MG: 5 TABLET ORAL at 00:30

## 2025-06-16 RX ADMIN — GABAPENTIN 800 MG: 400 CAPSULE ORAL at 08:12

## 2025-06-16 RX ADMIN — Medication 1 PATCH: at 08:14

## 2025-06-16 RX ADMIN — BUDESONIDE AND FORMOTEROL FUMARATE DIHYDRATE 2 PUFF: 160; 4.5 AEROSOL RESPIRATORY (INHALATION) at 20:42

## 2025-06-16 RX ADMIN — GABAPENTIN 800 MG: 400 CAPSULE ORAL at 20:41

## 2025-06-16 RX ADMIN — HEPARIN SODIUM 5000 UNITS: 5000 INJECTION INTRAVENOUS; SUBCUTANEOUS at 05:55

## 2025-06-16 RX ADMIN — OXYCODONE HYDROCHLORIDE 5 MG: 5 TABLET ORAL at 08:12

## 2025-06-16 RX ADMIN — HYDROMORPHONE HYDROCHLORIDE 0.2 MG: 0.2 INJECTION, SOLUTION INTRAMUSCULAR; INTRAVENOUS; SUBCUTANEOUS at 22:15

## 2025-06-16 RX ADMIN — VENLAFAXINE HYDROCHLORIDE 225 MG: 150 CAPSULE, EXTENDED RELEASE ORAL at 08:17

## 2025-06-16 NOTE — MALNUTRITION/BMI
This medical record reflects one or more clinical indicators suggestive of malnutrition .Malnutrition Findings:   Adult Malnutrition type: Chronic illness  Adult Degree of Malnutrition: Other severe protein calorie malnutrition  Malnutrition Characteristics: Muscle loss, Weight loss                360 Statement: other severe protein calorie malnutrition in setting of chronic illness, evidenced by severe muscle loss/indented temples, protruding clavicles, wt loss 31% wt loss x 12 months, 06/05/24 63 kg (139 lb) to 6/14/25 Last Wt: 43.4 kg (95 lb 10.9 oz), treated with diet and supplements    BMI Findings:  Adult BMI Classifications: Underweight < 18.5        Body mass index is 15.44 kg/m².     See Nutrition note dated 6/16/25 for additional details.  Completed nutrition assessment is viewable in the nutrition documentation.

## 2025-06-16 NOTE — UTILIZATION REVIEW
NOTIFICATION OF INPATIENT ADMISSION   AUTHORIZATION REQUEST   SERVICING FACILITY:   [place] [address]  Tax ID: [id number]  NPI: [id number] ATTENDING PROVIDER:  Attending Name and NPI#: Mona Burroughs Md [[id number]]  Address: [address] [place] [address]  Phone: [phone]     ADMISSION INFORMATION:  Place of Service: Inpatient [facility]  Place of Service Code: 21  Inpatient Admission Date/Time: 6/14/25  6:28 PM  Discharge Date/Time: No discharge date for patient encounter.  Admitting Diagnosis Code/Description:  Rib pain [R07.81]  Dilated cbd, acquired [K83.8]  Severe sepsis (HCC) [A41.9, R65.20]  Pulmonary nodule 1 cm or greater in diameter [R91.1]     UTILIZATION REVIEW CONTACT:  Ziat Goodman, Utilization   Network Utilization Review Department  Phone: [phone]  Fax [phone]  Email: [email or web address]  Contact for approvals/pending authorizations, clinical reviews, and discharge.     PHYSICIAN ADVISORY SERVICES:  Medical Necessity Denial & Ixzd-zr-Kifr Review  Phone: [phone]  Fax: [phone]  Email: [email or web address]     DISCHARGE SUPPORT TEAM:  For Patients Discharge Needs & Updates  Phone: [phone] opt. 2 Fax: [phone]  Email: [email or web address]

## 2025-06-16 NOTE — ASSESSMENT & PLAN NOTE
No history of diabetes mellitus.  Monitor with a.m. labs and recheck hemoglobin A1c    Results from last 7 days   Lab Units 06/16/25  0436 06/15/25  0441 06/14/25  1415   GLUCOSE RANDOM mg/dL 131 74 196*   HEMOGLOBIN A1C %  --  6.2*  --

## 2025-06-16 NOTE — PROGRESS NOTES
"Progress Note - Hospitalist   Name: Nuris Infante 54 y.o. female I MRN: [id number]  Unit/Bed#: E5 -01 I Date of Admission: 6/14/2025   Date of Service: 6/16/2025 I Hospital Day: 2    Assessment & Plan  Dilated bile duct  History of COPD mood disorder tobacco use and neuropathy who presents to the hospital for worsening right-sided abdominal/flank pains  In the ED underwent ultrasound and CT.  No evidence of cholecystitis but there are concerning findings for dilatation of CBD/pancreatic ducts  MRI showed \"Intrahepatic and extrahepatic biliary dilation with abrupt caliber change of the mid CBD at the pancreatic head. Numerous subcentimeter cystic foci within the pancreatic head with diffuse pancreatic head enlargement.\"  Await GI input regarding need for EUS versus ERCP.  Results from last 7 days   Lab Units 06/16/25 0436 06/15/25  0441 06/14/25  1415   AST U/L 56* 56* 129*   ALT U/L 50 62* 78*   TOTAL BILIRUBIN mg/dL 0.32 0.40 0.59     Recurrent major depressive disorder, in full remission (HCC)  Follows with psychiatry as an outpatient  Mood stable  Continue venlafaxine and buspirone  COPD (chronic obstructive pulmonary disease) (HCC)  COPD without exacerbation.  Continue Symbicort and albuterol as needed  Pulmonary nodule 1 cm or greater in diameter  CT pe study w abdomen pelvis w contrast: Mixed solid and groundglass nodule within the right upper lobe is increased in size since 6/7/2023. Findings are suspicious for adenocarcinoma spectrum lesion.   Ambulatory referral placed for outpatient pulmonology evaluation  Hyponatremia  Given above pulmonary nodule finding may be SIADH  Start NSS.  Recheck labs in AM.    Results from last 7 days   Lab Units 06/16/25  0436 06/15/25  0441 06/14/25  1415   SODIUM mmol/L 137 136 129*     Hyperglycemia  No history of diabetes mellitus.  Monitor with a.m. labs and recheck hemoglobin A1c    Results from last 7 days   Lab Units 06/16/25 0436 06/15/25  0441 06/14/25  1415 "   GLUCOSE RANDOM mg/dL 131 74 196*   HEMOGLOBIN A1C %  --  6.2*  --      Tobacco abuse  Nicotine patch ordered during hospitalization  Gastroesophageal reflux disease without esophagitis  Continue PPI  Peroneal neuropathy, right  Continue gabapentin  Elevated liver function tests  Trending down.  MRCP results discussed with patient.  Await GI input    VTE Pharmacologic Prophylaxis: VTE Score: 3 High Risk (Score >/= 5) - Pharmacological DVT Prophylaxis Ordered: enoxaparin (Lovenox). Sequential Compression Devices Ordered.    Mobility:   Basic Mobility Inpatient Raw Score: 24  JH-HLM Goal: 8: Walk 250 feet or more  JH-HLM Achieved: 8: Walk 250 feet ot more  JH-HLM Goal achieved. Continue to encourage appropriate mobility.    Patient Centered Rounds: I performed bedside rounds with nursing staff today.   Discussions with Specialists or Other Care Team Provider: Discussed with nursing and case management    Education and Discussions with Family / Patient: Patient declined call to .     Current Length of Stay: 2 day(s)  Current Patient Status: Inpatient   Certification Statement: The patient will continue to require additional inpatient hospital stay due to await further testing and monitoring of LFTs  Discharge Plan: Anticipate discharge in 24-48 hrs to home.    Code Status: Level 1 - Full Code    Subjective   Patient seen and examined at bedside.  Still complains of abdominal pain.  Denies any nausea or vomiting.  Remains afebrile.    Objective :  Temp:  [97.6 °F (36.4 °C)-98.3 °F (36.8 °C)] 98.3 °F (36.8 °C)  HR:  [] 111  BP: (141-155)/() 155/110  Resp:  [18] 18  SpO2:  [96 %-97 %] 96 %  O2 Device: None (Room air)    Body mass index is 15.44 kg/m².     Input and Output Summary (last 24 hours):     Intake/Output Summary (Last 24 hours) at 6/16/2025 1347  Last data filed at 6/16/2025 1300  Gross per 24 hour   Intake 960 ml   Output 3100 ml   Net -2140 ml       Physical Exam  Constitutional:        General: She is not in acute distress.  HENT:      Head: Normocephalic.      Nose: Nose normal.      Mouth/Throat:      Mouth: Mucous membranes are moist.     Eyes:      Extraocular Movements: Extraocular movements intact.      Pupils: Pupils are equal, round, and reactive to light.       Cardiovascular:      Rate and Rhythm: Normal rate and regular rhythm.   Pulmonary:      Effort: Pulmonary effort is normal.      Breath sounds: Normal breath sounds.   Abdominal:      Comments: Right upper quadrant tenderness without rebound or guarding.  Bowel sounds audible.  Abdomen soft.     Musculoskeletal:      Right lower leg: No edema.      Left lower leg: No edema.     Skin:     General: Skin is warm.     Neurological:      General: No focal deficit present.      Mental Status: She is alert and oriented to person, place, and time. Mental status is at baseline.     Psychiatric:         Mood and Affect: Mood normal.           Lines/Drains:              Lab Results: I have reviewed the following results:   Results from last 7 days   Lab Units 06/16/25  0436   WBC Thousand/uL 8.70   HEMOGLOBIN g/dL 10.9*   HEMATOCRIT % 32.4*   PLATELETS Thousands/uL 296   SEGS PCT % 57   LYMPHO PCT % 35   MONO PCT % 5   EOS PCT % 2     Results from last 7 days   Lab Units 06/16/25  0436   SODIUM mmol/L 137   POTASSIUM mmol/L 3.6   CHLORIDE mmol/L 100   CO2 mmol/L 32   BUN mg/dL 11   CREATININE mg/dL 0.86   ANION GAP mmol/L 5   CALCIUM mg/dL 8.7   ALBUMIN g/dL 2.8*   TOTAL BILIRUBIN mg/dL 0.32   ALK PHOS U/L 277*   ALT U/L 50   AST U/L 56*   GLUCOSE RANDOM mg/dL 131     Results from last 7 days   Lab Units 06/16/25  0436   INR  0.84*         Results from last 7 days   Lab Units 06/15/25  0441   HEMOGLOBIN A1C % 6.2*     Results from last 7 days   Lab Units 06/14/25  1612 06/14/25  1415   LACTIC ACID mmol/L 1.8 3.4*   PROCALCITONIN ng/ml  --  0.15       Recent Cultures (last 7 days):   Results from last 7 days   Lab Units 06/14/25  1451    BLOOD CULTURE  No Growth at 24 hrs.  No Growth at 24 hrs.             Last 24 Hours Medication List:     Current Facility-Administered Medications:     acetaminophen (TYLENOL) tablet 650 mg, Q4H PRN    albuterol (PROVENTIL HFA,VENTOLIN HFA) inhaler 2 puff, Q6H PRN    budesonide-formoterol (SYMBICORT) 160-4.5 mcg/act inhaler 2 puff, BID    busPIRone (BUSPAR) tablet 15 mg, TID    gabapentin (NEURONTIN) capsule 800 mg, Q12H CHOCO    heparin (porcine) subcutaneous injection 5,000 Units, Q8H CHOCO    HYDROmorphone (DILAUDID) injection 0.5 mg, Q4H PRN    LORazepam (ATIVAN) injection 1 mg, Once PRN    nicotine (NICODERM CQ) 14 mg/24hr TD 24 hr patch 1 patch, Daily    ondansetron (ZOFRAN) injection 4 mg, Q4H PRN    oxyCODONE (ROXICODONE) IR tablet 5 mg, Q4H PRN    pantoprazole (PROTONIX) EC tablet 40 mg, Early Morning    venlafaxine (EFFEXOR-XR) 24 hr capsule 225 mg, Daily    Administrative Statements   Today, Patient Was Seen By: Mona Burroughs MD      **Please Note: This note may have been constructed using a voice recognition system.**

## 2025-06-16 NOTE — ASSESSMENT & PLAN NOTE
54-year-old female presenting with 2 weeks of on and off right sided abdominal pain with concern for cholecystitis and imaging findings of mildly dilated CBD    6/15 MRCP showing abrupt caliber change in the mid CBD by the pancreatic head    AVSS ORA    WBC 8.7 from 10.1  Hb 10.9 from 11.8  Alk phos remains mildly elevated 277 from 310     Plan  - GI evaluation for potential EUS or ERCP  - Okay for diet from surgery standpoint if not undergoing procedures  - Would hold on cholecystectomy for now, patient is not a candidate for elective cholecystectomy given duration of symptoms, symptoms likely caused by CBD pathology and there are no signs of cholecystitis on imaging  - Analgesia and antiemetic as needed  - DVT PPx while inpatient

## 2025-06-16 NOTE — ASSESSMENT & PLAN NOTE
May 22, 2023     Patient: Felicia Wallace   YOB: 1963   Date of Visit: 5/22/2023       To Whom it May Concern:    Felicia Wallace was seen in my clinic on 5/22/2023 at 4:20 pm.  Ms. Wallace has multiple serious medical conditions directly affecting her ability to commute to the office. Patient will highly benefit from working from home long-term.     Sincerely,       Shari Frazier CNP    Medical information is confidential and cannot be disclosed without the written consent of the patient or her representative.     CBD dilated to 10 mm on right upper quadrant ultrasound.  ERCP as above.

## 2025-06-16 NOTE — UTILIZATION REVIEW
Initial Clinical Review    Admission: Date/Time/Statement:   Admission Orders (From admission, onward)       Ordered        06/14/25 1828  INPATIENT ADMISSION  Once                          Orders Placed This Encounter   Procedures    INPATIENT ADMISSION     Standing Status:   Standing     Number of Occurrences:   1     Level of Care:   Med Surg [16]     Estimated length of stay:   More than 2 Midnights     Certification:   I certify that inpatient services are medically necessary for this patient for a duration of greater than two midnights. See H&P and MD Progress Notes for additional information about the patient's course of treatment.     ED Arrival Information       Expected   -    Arrival   6/14/2025 13:35    Acuity   Urgent              Means of arrival   Wheelchair    Escorted by   Spouse    Service   Hospitalist    Admission type   Emergency              Arrival complaint   Rib pain             Chief Complaint   Patient presents with    Rib Pain     Pt reports right sided rib pain for approx 1 wk, sob. Hx of broken ribs        Initial Presentation: 54 y.o. female presents to ED from home  with worsening right sided pain.  Initially  felt it was  just rib pain, present for about 1  month.  Symptoms became constant over the past week.    Not  any worse with eating.   Has non bloody emesis. Labs reveal  elevated LFT's and dilation of bile ducts warranting further work up.  PMH is  COPD, depression, neuropathy, GERD and tobacco use.  Admit  Ip with  Dilated bile duct, Hyponatremia  and plan is monitor labs, GI consult, MRI/MRCP, IVF  and continue home meds.    Surgery consult  Cholecystitis  vs  cholangitis  Wait  MRCP.   If  cholecystectomy needed, will need  perc  blanche  given 2 weeks of symptoms.    Anticipated Length of Stay/Certification Statement:  Patient will be admitted on an inpatient basis with an anticipated length of stay of greater than 2 midnights secondary to concerns for choledocholithiasis.        Date:   6/15   Day 2:   GI consult  Wait  MRI/MRCP.  Continue  protonix.   No evidence  of cholangitis, no need for antibiotics.  Monitor LFT's.  Wait  surgery input.    Sodium improved to  136.   May need EUS  given MRI results.   Continue to monitor labs.    ED Treatment-Medication Administration from 06/14/2025 1335 to 06/14/2025 2004         Date/Time Order Dose Route Action     06/14/2025 1415 acetaminophen (Ofirmev) injection 1,000 mg 1,000 mg Intravenous New Bag     06/14/2025 1415 lidocaine (LIDODERM) 5 % patch 1 patch 1 patch Topical Medication Applied     06/14/2025 1456 ceftriaxone (ROCEPHIN) 1 g/50 mL in dextrose IVPB 1,000 mg Intravenous New Bag     06/14/2025 1454 morphine injection 4 mg 4 mg Intravenous Given     06/14/2025 1453 ondansetron (ZOFRAN) injection 4 mg 4 mg Intravenous Given     06/14/2025 1510 multi-electrolyte (Plasmalyte-A/Isolyte-S PH 7.4/Normosol-R) IV bolus 1,750 mL 1,750 mL Intravenous New Bag     06/14/2025 1559 iohexol (OMNIPAQUE) 350 MG/ML injection (MULTI-DOSE) 100 mL 100 mL Intravenous Given     06/14/2025 1632 HYDROmorphone (DILAUDID) injection 0.5 mg 0.5 mg Intravenous Given            Scheduled Medications:  budesonide-formoterol, 2 puff, Inhalation, BID  busPIRone, 15 mg, Oral, TID  gabapentin, 800 mg, Oral, Q12H CHOCO  heparin (porcine), 5,000 Units, Subcutaneous, Q8H CHOCO  nicotine, 1 patch, Transdermal, Daily  pantoprazole, 40 mg, Oral, Early Morning  venlafaxine, 225 mg, Oral, Daily      Continuous IV Infusions:  IVF  NSS   75/hr - D/C    6/15     PRN Meds:  acetaminophen, 650 mg, Oral, Q4H PRN  albuterol, 2 puff, Inhalation, Q6H PRN  HYDROmorphone, 0.5 mg, Intravenous, Q4H PRN  LORazepam, 1 mg, Intravenous, Once PRN  ondansetron, 4 mg, Intravenous, Q4H PRN  oxyCODONE, 5 mg, Oral, Q4H PRN      ED Triage Vitals   Temperature Pulse Respirations Blood Pressure SpO2 Pain Score   06/14/25 1341 06/14/25 1341 06/14/25 1341 06/14/25 1341 06/14/25 1341 06/14/25 1454   98 °F  (36.7 °C) (!) 121 (!) 28 117/76 100 % 7     Weight (last 2 days)       Date/Time Weight    06/14/25 20:07:35 43.4 (95.68)            Vital Signs (last 3 days)       Date/Time Temp Pulse Resp BP MAP (mmHg) SpO2 O2 Device Patient Position - Orthostatic VS Melanie Coma Scale Score Pain    06/16/25 0812 -- -- -- -- -- -- -- -- -- 7 06/16/25 07:15:16 98.3 °F (36.8 °C) 111 18 155/110 125 96 % None (Room air) Lying -- --    06/16/25 0030 -- -- -- -- -- -- -- -- -- 8 06/16/25 0003 -- -- -- -- -- -- -- -- 15 --    06/15/25 23:03:21 97.6 °F (36.4 °C) 72 18 143/98 113 96 % None (Room air) Lying -- --    06/15/25 2113 -- -- -- -- -- -- -- -- -- 8    06/15/25 1922 -- -- -- -- -- -- -- -- -- 8    06/15/25 15:23:05 98.1 °F (36.7 °C) 107 18 141/92 108 97 % -- -- -- --    06/15/25 1425 -- -- -- -- -- -- -- -- -- 7    06/15/25 1100 -- -- -- -- -- -- -- -- -- 8    06/15/25 07:53:39 98.2 °F (36.8 °C) -- -- 159/97 118 -- -- -- -- --    06/15/25 07:53:14 98.2 °F (36.8 °C) -- -- 159/97 118 -- -- -- -- --    06/15/25 0749 -- -- -- -- -- -- -- -- -- 8    06/15/25 0748 -- -- -- -- -- -- -- -- -- 8    06/15/25 0740 -- -- -- -- -- -- -- -- 15 --    06/15/25 07:25:50 97.5 °F (36.4 °C) -- 16 147/103 118 -- -- -- -- --    06/15/25 0443 -- -- -- -- -- -- -- -- -- 8 06/14/25 23:29:59 98 °F (36.7 °C) 113 18 162/96 118 92 % None (Room air) Lying -- --    06/14/25 2202 -- -- -- -- -- -- -- -- -- 7 06/14/25 2034 -- -- -- -- -- -- -- -- -- 8 06/14/25 2017 -- -- -- -- -- -- -- -- -- 8 06/14/25 2014 -- -- -- -- -- -- -- -- 15 --    06/14/25 20:07:35 98 °F (36.7 °C) 103 20 159/97 118 96 % None (Room air) -- -- --    06/14/25 1730 -- 102 18 124/93 104 96 % None (Room air) Lying -- --    06/14/25 1632 -- -- -- -- -- -- -- -- -- 7 06/14/25 1613 -- 103 20 145/82 -- 99 % None (Room air) Sitting -- 7 06/14/25 1606 -- 104 -- -- -- -- -- -- -- --    06/14/25 1530 -- 114 18 145/87 110 95 % None (Room air) Sitting -- --    06/14/25 1500 --  113 20 137/87 105 96 % None (Room air) Sitting -- --    06/14/25 1454 -- -- -- -- -- -- -- -- -- 7    06/14/25 1448 -- -- -- -- -- -- -- -- 15 --    06/14/25 1347 -- 109 -- -- -- -- -- -- -- --    06/14/25 1341 98 °F (36.7 °C) 121 28 117/76 -- 100 % None (Room air) Sitting -- --              Pertinent Labs/Diagnostic Test Results:   Radiology:  MRI abdomen wo contrast and mrcp   Final Interpretation by Sam Pate MD (06/15 1071)      Motion limited study. Also limited without IV contrast.      Intrahepatic and extrahepatic biliary dilation with abrupt caliber change of the mid CBD at the pancreatic head. Numerous subcentimeter cystic foci within the pancreatic head with diffuse pancreatic head enlargement. Patient may benefit from EUS    evaluation.      The study was marked in EPIC for immediate notification.         Workstation performed: [id number]         US right upper quadrant   Final Interpretation by Romel Polanco MD (06/14 1804)      Dilated CBD. No evidence for cholelithiasis. Correlate with prior CT for additional findings.      Workstation performed: [id number]         CT pe study w abdomen pelvis w contrast   Final Interpretation by Romel Polanco MD (06/14 7193)      No evidence for pulmonary embolism.      Mixed solid and groundglass nodule within the right upper lobe is increased in size since 6/7/2023. Findings are suspicious for adenocarcinoma spectrum lesion.      Circumferential wall thickening of the duodenum suggesting the presence of duodenitis. Underlying neoplastic etiologies are not entirely excluded.      Dilatation of the CBD and pancreatic duct is new since the prior examination. No discrete mass lesion identified on the current examination. MRCP is recommended for further evaluation. 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      Remainder of the  findings, as described above.      The study was marked in EPIC for immediate notification.            Computerized Assisted Algorithm (CAA) may have aided analysis of applicable images.      Workstation performed: [id number]         XR chest portable   Final Interpretation by Joellen Booth MD (06/15 0728)      No acute cardiopulmonary disease.      Right upper lobe nodule not visible. See subsequent chest CT.            Workstation performed: [id number]           Cardiology:            Results from last 7 days   Lab Units 06/16/25  0436 06/15/25  0441 06/14/25  1415   WBC Thousand/uL 8.70 10.15 10.79*   HEMOGLOBIN g/dL 10.9* 11.8 12.9   HEMATOCRIT % 32.4* 37.6 38.5   PLATELETS Thousands/uL 296 342 372   TOTAL NEUT ABS Thousands/µL 4.93  --  7.55         Results from last 7 days   Lab Units 06/16/25  0436 06/15/25  0441 06/14/25  1415   SODIUM mmol/L 137 136 129*   POTASSIUM mmol/L 3.6 3.4* 4.0   CHLORIDE mmol/L 100 100 97   CO2 mmol/L 32 31 19*   ANION GAP mmol/L 5 5 13   BUN mg/dL 11 10 12   CREATININE mg/dL 0.86 1.00 1.07   EGFR ml/min/1.73sq m 75 63 58   CALCIUM mg/dL 8.7 8.3* 8.7     Results from last 7 days   Lab Units 06/16/25  0436 06/15/25  0441 06/14/25  1415   AST U/L 56* 56* 129*   ALT U/L 50 62* 78*   ALK PHOS U/L 277* 310* 377*   TOTAL PROTEIN g/dL 5.2* 5.5* 6.2*   ALBUMIN g/dL 2.8* 2.9* 3.2*   TOTAL BILIRUBIN mg/dL 0.32 0.40 0.59   BILIRUBIN DIRECT mg/dL 0.07  --   --          Results from last 7 days   Lab Units 06/16/25  0436 06/15/25  0441 06/14/25  1415   GLUCOSE RANDOM mg/dL 131 74 196*         Results from last 7 days   Lab Units 06/15/25  0441   HEMOGLOBIN A1C % 6.2*   EAG mg/dl 131           Results from last 7 days   Lab Units 06/14/25  1612 06/14/25  1417   HS TNI 0HR ng/L  --  8   HS TNI 2HR ng/L 5  --    HSTNI D2 ng/L -3  --      Results from last 7 days   Lab Units 06/14/25  1415   D-DIMER QUANTITATIVE ug/ml FEU 1.72*     Results from last 7 days   Lab Units 06/16/25  2054  06/14/25  1415   PROTIME seconds 11.7* 12.1*   INR  0.84* 0.87   PTT seconds  --  24         Results from last 7 days   Lab Units 06/14/25  1415   PROCALCITONIN ng/ml 0.15     Results from last 7 days   Lab Units 06/14/25  1612 06/14/25  1415   LACTIC ACID mmol/L 1.8 3.4*             Results from last 7 days   Lab Units 06/14/25  1415   BNP pg/mL 65     Results from last 7 days   Lab Units 06/15/25  0441   FERRITIN ng/mL 219   IRON SATURATION % 43   IRON ug/dL 90   TIBC ug/dL 210*     Results from last 7 days   Lab Units 06/15/25  0441   TRANSFERRIN mg/dL 150*         Results from last 7 days   Lab Units 06/15/25  0810   HEP B S AG  Non-reactive   HEP C AB  Non-reactive   HEP B C IGM  Non-reactive     Results from last 7 days   Lab Units 06/14/25  1415   LIPASE u/L 168*               Results from last 7 days   Lab Units 06/15/25  0810   ACETAMINOPHEN LVL ug/mL 5*                 Results from last 7 days   Lab Units 06/14/25  1451   BLOOD CULTURE  No Growth at 24 hrs.  No Growth at 24 hrs.               Admitting Diagnosis: Rib pain [R07.81]  Dilated cbd, acquired [K83.8]  Severe sepsis (HCC) [A41.9, R65.20]  Pulmonary nodule 1 cm or greater in diameter [R91.1]  Age/Sex: 54 y.o. female    Network Utilization Review Department  ATTENTION: Please call with any questions or concerns to [phone] and carefully listen to the prompts so that you are directed to the right person. All voicemails are confidential.   For Discharge needs, contact Care Management DC Support Team at [phone] opt. 2  Send all requests for admission clinical reviews, approved or denied determinations and any other requests to dedicated fax number below belonging to the campus where the patient is receiving treatment. List of dedicated fax numbers for the Facilities:  FACILITY NAME UR FAX NUMBER   ADMISSION DENIALS (Administrative/Medical Necessity) [phone]   DISCHARGE SUPPORT TEAM (NETWORK) [phone]   [facility] CHILD HEALTH  (Maternity/NICU/Pediatrics) [phone]   [facility] [phone]   General acute hospital [phone]   [facility] [phone]   General acute hospital [phone]   [facility] [phone]   [facility] [phone]   [facility] [phone]   [facility] [phone]   [facility] [phone]   [facility] [phone]   [facility] [phone]   [facility] [place] [phone]

## 2025-06-16 NOTE — PROGRESS NOTES
Progress Note - Gastroenterology   Name: Nuris Infante 54 y.o. 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  INR 0.87.  Lipase 168.  WBC 10.7 with lactic acidosis of 3.4 which corrected with IV fluid administration.  Pattern of liver chemistry mixed picture.  However, patient did have right upper quadrant ultrasound demonstrating a dilated gallbladder and CBD measuring 10 mm.  No intrahepatic dilation seen or evidence of choledocholithiasis with MRCP with motion artifact, but showing similar findings.  Would also consider DILI in differential and will plan to complete serologic workup given mixed pattern of elevation and history of elevated liver enzymes in the past.  Will plan for ERCP to further evaluate for possible biliary stricture versus choledocholithiasis versus malignancy.    Plan:  Follow-up serologic workup  Okay for diet today.  N.p.o. midnight  Plan for ERCP to further evaluate biliary dilatation tomorrow  Continue to monitor and trend LFTs to ensure improvement  Avoid use of hepatotoxic agents  General Surgery consulted and appreciate recommendations  Additional pain and symptom management per primary team  Dilated bile duct  CBD dilated to 10 mm on right upper quadrant ultrasound.  ERCP as above.  Gastroesophageal reflux disease without esophagitis  History of GERD.  Symptoms currently controlled  at this time.  Okay to continue on Protonix 40 mg daily while inpatient.      I have discussed the above management plan in detail with the primary service.     Subjective   Patient with continued right upper quadrant tenderness, although appears quite comfortable if not palpating abdomen.  Denies nausea or vomiting.    Objective :  Temp:  [97.6 °F (36.4 °C)-98.5 °F (36.9 °C)] 98.5 °F (36.9 °C)  HR:  [] 107  BP: (143-155)/() 149/102  Resp:  [18] 18  SpO2:  [95 %-96 %] 95 %  O2 Device: None (Room air)    Physical Exam  Vitals and nursing note reviewed.   Constitutional:       General: She is not in acute distress.     Appearance: She is well-developed and normal weight.   Abdominal:      General: Abdomen is flat. There is no distension.      Palpations: Abdomen is soft.      Tenderness: There is abdominal tenderness.     Musculoskeletal:         General: No swelling.     Neurological:      Mental Status: She is alert.         Lab Results: I have reviewed the following results:none    Imaging Results Review: No pertinent imaging studies reviewed.  Other Study Results Review: No additional pertinent studies reviewed.    Administrative Statements   I have spent a total time of 32 minutes in caring for this patient on the day of the visit/encounter including Diagnostic results, Prognosis, Risks and benefits of tx options, Instructions for management, and Patient and family education.

## 2025-06-16 NOTE — ASSESSMENT & PLAN NOTE
"History of COPD mood disorder tobacco use and neuropathy who presents to the hospital for worsening right-sided abdominal/flank pains  In the ED underwent ultrasound and CT.  No evidence of cholecystitis but there are concerning findings for dilatation of CBD/pancreatic ducts  MRI showed \"Intrahepatic and extrahepatic biliary dilation with abrupt caliber change of the mid CBD at the pancreatic head. Numerous subcentimeter cystic foci within the pancreatic head with diffuse pancreatic head enlargement.\"  Await GI input regarding need for EUS versus ERCP.  Results from last 7 days   Lab Units 06/16/25  0436 06/15/25  0441 06/14/25  1415   AST U/L 56* 56* 129*   ALT U/L 50 62* 78*   TOTAL BILIRUBIN mg/dL 0.32 0.40 0.59     "

## 2025-06-16 NOTE — ASSESSMENT & PLAN NOTE
Given above pulmonary nodule finding may be SIADH  Start NSS.  Recheck labs in AM.    Results from last 7 days   Lab Units 06/16/25  0436 06/15/25  0441 06/14/25  1415   SODIUM mmol/L 137 136 129*

## 2025-06-16 NOTE — PLAN OF CARE
Problem: PAIN - ADULT  Goal: Verbalizes/displays adequate comfort level or baseline comfort level  Description: Interventions:  - Encourage patient to monitor pain and request assistance  - Assess pain using appropriate pain scale  - Administer analgesics as ordered based on type and severity of pain and evaluate response  - Implement non-pharmacological measures as appropriate and evaluate response  - Consider cultural and social influences on pain and pain management  - Notify physician/advanced practitioner if interventions unsuccessful or patient reports new pain  - Educate patient/family on pain management process including their role and importance of  reporting pain   - Provide non-pharmacologic/complimentary pain relief interventions  Outcome: Progressing     Problem: DISCHARGE PLANNING  Goal: Discharge to home or other facility with appropriate resources  Description: INTERVENTIONS:  - Identify barriers to discharge w/patient and caregiver  - Arrange for needed discharge resources and transportation as appropriate  - Identify discharge learning needs (meds, wound care, etc.)  - Arrange for interpretive services to assist at discharge as needed  - Refer to Case Management Department for coordinating discharge planning if the patient needs post-hospital services based on physician/advanced practitioner order or complex needs related to functional status, cognitive ability, or social support system  Outcome: Progressing     Problem: Knowledge Deficit  Goal: Patient/family/caregiver demonstrates understanding of disease process, treatment plan, medications, and discharge instructions  Description: Complete learning assessment and assess knowledge base.  Interventions:  - Provide teaching at level of understanding  - Provide teaching via preferred learning methods  Outcome: Progressing     Problem: Nutrition/Hydration-ADULT  Goal: Nutrient/Hydration intake appropriate for improving, restoring or maintaining  nutritional needs  Description: Monitor and assess patient's nutrition/hydration status for malnutrition. Collaborate with interdisciplinary team and initiate plan and interventions as ordered.  Monitor patient's weight and dietary intake as ordered or per policy. Utilize nutrition screening tool and intervene as necessary. Determine patient's food preferences and provide high-protein, high-caloric foods as appropriate.     INTERVENTIONS:  - Monitor oral intake, urinary output, labs, and treatment plans  - Assess nutrition and hydration status and recommend course of action  - Evaluate amount of meals eaten  - Assist patient with eating if necessary   - Allow adequate time for meals  - Recommend/ encourage appropriate diets, oral nutritional supplements, and vitamin/mineral supplements  - Order, calculate, and assess calorie counts as needed  - Recommend, monitor, and adjust tube feedings and TPN/PPN based on assessed needs  - Assess need for intravenous fluids  - Provide specific nutrition/hydration education as appropriate  - Include patient/family/caregiver in decisions related to nutrition  Outcome: Progressing

## 2025-06-16 NOTE — PROGRESS NOTES
Progress Note - Surgery-General   Name: Nuris Infante 54 y.o. female I MRN: [id number]  Unit/Bed#: E5 -01 I Date of Admission: 6/14/2025   Date of Service: 6/16/2025 I Hospital Day: 2    Assessment & Plan  Dilated bile duct  54-year-old female presenting with 2 weeks of on and off right sided abdominal pain with concern for cholecystitis and imaging findings of mildly dilated CBD    6/15 MRCP showing abrupt caliber change in the mid CBD by the pancreatic head    AVSS ORA    WBC 8.7 from 10.1  Hb 10.9 from 11.8  Alk phos remains mildly elevated 277 from 310     Plan  - GI evaluation for potential EUS or ERCP  - Okay for diet from surgery standpoint if not undergoing procedures  - Would hold on cholecystectomy for now, patient is not a candidate for elective cholecystectomy given duration of symptoms, symptoms likely caused by CBD pathology and there are no signs of cholecystitis on imaging  - Analgesia and antiemetic as needed  - DVT PPx while inpatient  Recurrent major depressive disorder, in full remission (HCC)    COPD (chronic obstructive pulmonary disease) (HCC)    Tobacco abuse    Gastroesophageal reflux disease without esophagitis    Hyperglycemia    Peroneal neuropathy, right    Pulmonary nodule 1 cm or greater in diameter    Hyponatremia    Elevated liver function tests        Subjective   Patient seen and examined.  CINDY.  Still endorsing mild right flank and epigastric abdominal pain, though improving from admission.  Continues to have bowel function.    Objective :  Temp:  [97.5 °F (36.4 °C)-98.2 °F (36.8 °C)] 97.6 °F (36.4 °C)  HR:  [] 72  BP: (141-159)/() 143/98  Resp:  [16-18] 18  SpO2:  [96 %-97 %] 96 %  O2 Device: None (Room air)    I/O         06/13 0701  06/14 0700 06/14 0701  06/15 0700 06/15 0701  06/16 0700    I.V. (mL/kg)  1750 (40.3)     IV Piggyback  100     Total Intake(mL/kg)  1850 (42.6)     Urine (mL/kg/hr)  800     Total Output  800     Net  +1050                   Physical Exam  General - no acute distress, responsive and cooperative  CV - warm, regular rate  Pulm - normal work of breathing, no respiratory distress  Abd -soft, tender to right flank and RUQ, nondistended  Neuro - m/s grossly intact, cn grossly intact  Ext - moving all extremities       Lab Results: I have reviewed the following results:  Recent Labs     06/14/25  1415 06/14/25  1417 06/14/25  1612 06/15/25  0441 06/16/25  0436   WBC 10.79*  --   --    < > 8.70   HGB 12.9  --   --    < > 10.9*   HCT 38.5  --   --    < > 32.4*     --   --    < > 296   SODIUM 129*  --   --    < > 137   K 4.0  --   --    < > 3.6   CL 97  --   --    < > 100   CO2 19*  --   --    < > 32   BUN 12  --   --    < > 11   CREATININE 1.07  --   --    < > 0.86   GLUC 196*  --   --    < > 131   *  --   --    < > 56*   ALT 78*  --   --    < > 50   ALB 3.2*  --   --    < > 2.8*   TBILI 0.59  --   --    < > 0.32   ALKPHOS 377*  --   --    < > 277*   PTT 24  --   --   --   --    INR 0.87  --   --   --  0.84*   HSTNI0  --  8  --   --   --    HSTNI2  --   --  5  --   --    BNP 65  --   --   --   --    LACTICACID 3.4*  --  1.8  --   --     < > = values in this interval not displayed.

## 2025-06-16 NOTE — PLAN OF CARE
Problem: PAIN - ADULT  Goal: Verbalizes/displays adequate comfort level or baseline comfort level  Description: Interventions:  - Encourage patient to monitor pain and request assistance  - Assess pain using appropriate pain scale  - Administer analgesics as ordered based on type and severity of pain and evaluate response  - Implement non-pharmacological measures as appropriate and evaluate response  - Consider cultural and social influences on pain and pain management  - Notify physician/advanced practitioner if interventions unsuccessful or patient reports new pain  - Educate patient/family on pain management process including their role and importance of  reporting pain   - Provide non-pharmacologic/complimentary pain relief interventions  Outcome: Progressing     Problem: INFECTION - ADULT  Goal: Absence or prevention of progression during hospitalization  Description: INTERVENTIONS:  - Assess and monitor for signs and symptoms of infection  - Monitor lab/diagnostic results  - Monitor all insertion sites, i.e. indwelling lines, tubes, and drains  - Monitor endotracheal if appropriate and nasal secretions for changes in amount and color  - [place] appropriate cooling/warming therapies per order  - Administer medications as ordered  - Instruct and encourage patient and family to use good hand hygiene technique  - Identify and instruct in appropriate isolation precautions for identified infection/condition  Outcome: Progressing  Goal: Absence of fever/infection during neutropenic period  Description: INTERVENTIONS:  - Monitor WBC  - Perform strict hand hygiene  - Limit to healthy visitors only  - No plants, dried, fresh or silk flowers with fuller in patient room  Outcome: Progressing     Problem: SAFETY ADULT  Goal: Patient will remain free of falls  Description: INTERVENTIONS:  - Educate patient/family on patient safety including physical limitations  - Instruct patient to call for assistance with activity   -  Consider consulting OT/PT to assist with strengthening/mobility based on AM PAC & JH-HLM score  - Consult OT/PT to assist with strengthening/mobility   - Keep Call bell within reach  - Keep bed low and locked with side rails adjusted as appropriate  - Keep care items and personal belongings within reach  - Initiate and maintain comfort rounds  - Make Fall Risk Sign visible to staff  - Offer Toileting every 2 Hours, in advance of need  - Initiate/Maintain bed alarm  - Obtain necessary fall risk management equipment:  socks  - Apply yellow socks and bracelet for high fall risk patients  - Consider moving patient to room near nurses station  Outcome: Progressing  Goal: Maintain or return to baseline ADL function  Description: INTERVENTIONS:  -  Assess patient's ability to carry out ADLs; assess patient's baseline for ADL function and identify physical deficits which impact ability to perform ADLs (bathing, care of mouth/teeth, toileting, grooming, dressing, etc.)  - Assess/evaluate cause of self-care deficits   - Assess range of motion  - Assess patient's mobility; develop plan if impaired  - Assess patient's need for assistive devices and provide as appropriate  - Encourage maximum independence but intervene and supervise when necessary  - Involve family in performance of ADLs  - Assess for home care needs following discharge   - Consider OT consult to assist with ADL evaluation and planning for discharge  - Provide patient education as appropriate  - Monitor functional capacity and physical performance, use of AM PAC & JH-HLM   - Monitor gait, balance and fatigue with ambulation    Outcome: Progressing     Problem: DISCHARGE PLANNING  Goal: Discharge to home or other facility with appropriate resources  Description: INTERVENTIONS:  - Identify barriers to discharge w/patient and caregiver  - Arrange for needed discharge resources and transportation as appropriate  - Identify discharge learning needs (meds, wound  care, etc.)  - Arrange for interpretive services to assist at discharge as needed  - Refer to Case Management Department for coordinating discharge planning if the patient needs post-hospital services based on physician/advanced practitioner order or complex needs related to functional status, cognitive ability, or social support system  Outcome: Progressing     Problem: Knowledge Deficit  Goal: Patient/family/caregiver demonstrates understanding of disease process, treatment plan, medications, and discharge instructions  Description: Complete learning assessment and assess knowledge base.  Interventions:  - Provide teaching at level of understanding  - Provide teaching via preferred learning methods  Outcome: Progressing     Problem: Nutrition/Hydration-ADULT  Goal: Nutrient/Hydration intake appropriate for improving, restoring or maintaining nutritional needs  Description: Monitor and assess patient's nutrition/hydration status for malnutrition. Collaborate with interdisciplinary team and initiate plan and interventions as ordered.  Monitor patient's weight and dietary intake as ordered or per policy. Utilize nutrition screening tool and intervene as necessary. Determine patient's food preferences and provide high-protein, high-caloric foods as appropriate.     INTERVENTIONS:  - Monitor oral intake, urinary output, labs, and treatment plans  - Assess nutrition and hydration status and recommend course of action  - Evaluate amount of meals eaten  - Assist patient with eating if necessary   - Allow adequate time for meals  - Recommend/ encourage appropriate diets, oral nutritional supplements, and vitamin/mineral supplements  - Order, calculate, and assess calorie counts as needed  - Recommend, monitor, and adjust tube feedings and TPN/PPN based on assessed needs  - Assess need for intravenous fluids  - Provide specific nutrition/hydration education as appropriate  - Include patient/family/caregiver in decisions  related to nutrition  Outcome: Progressing

## 2025-06-16 NOTE — ASSESSMENT & PLAN NOTE
54-year-old female with history of COPD, depression, GERD, tobacco use, and prior pancreatic pseudocyst requiring surgical intervention and midline laparotomy incision after failed cystgastrostomy (several years ago) who presented on 6/14 with several weeks of epigastric and right upper quadrant abdominal pain associated with occasional nausea and vomiting found to have elevated liver chemistries including AST//78, alkaline phosphatase 377, and total bilirubin 0.59.  INR 0.87.  Lipase 168.  WBC 10.7 with lactic acidosis of 3.4 which corrected with IV fluid administration.  Pattern of liver chemistry mixed picture.  However, patient did have right upper quadrant ultrasound demonstrating a dilated gallbladder and CBD measuring 10 mm.  No intrahepatic dilation seen or evidence of choledocholithiasis with MRCP with motion artifact, but showing similar findings.  Would also consider DILI in differential and will plan to complete serologic workup given mixed pattern of elevation and history of elevated liver enzymes in the past.  Will plan for ERCP to further evaluate for possible biliary stricture versus choledocholithiasis versus malignancy.    Plan:  Follow-up serologic workup  Okay for diet today.  N.p.o. midnight  Plan for ERCP to further evaluate biliary dilatation tomorrow  Continue to monitor and trend LFTs to ensure improvement  Avoid use of hepatotoxic agents  General Surgery consulted and appreciate recommendations  Additional pain and symptom management per primary team

## 2025-06-17 ENCOUNTER — APPOINTMENT (INPATIENT)
Dept: RADIOLOGY | Facility: HOSPITAL | Age: 55
DRG: 444 | End: 2025-06-17
Payer: COMMERCIAL

## 2025-06-17 ENCOUNTER — PREP FOR PROCEDURE (OUTPATIENT)
Dept: GASTROENTEROLOGY | Facility: CLINIC | Age: 55
End: 2025-06-17

## 2025-06-17 ENCOUNTER — ANESTHESIA (INPATIENT)
Dept: GASTROENTEROLOGY | Facility: HOSPITAL | Age: 55
DRG: 444 | End: 2025-06-17
Payer: COMMERCIAL

## 2025-06-17 ENCOUNTER — ANESTHESIA EVENT (INPATIENT)
Dept: GASTROENTEROLOGY | Facility: HOSPITAL | Age: 55
DRG: 444 | End: 2025-06-17
Payer: COMMERCIAL

## 2025-06-17 ENCOUNTER — APPOINTMENT (INPATIENT)
Dept: GASTROENTEROLOGY | Facility: HOSPITAL | Age: 55
DRG: 444 | End: 2025-06-17
Attending: STUDENT IN AN ORGANIZED HEALTH CARE EDUCATION/TRAINING PROGRAM
Payer: COMMERCIAL

## 2025-06-17 DIAGNOSIS — R79.89 ELEVATED LIVER FUNCTION TESTS: ICD-10-CM

## 2025-06-17 DIAGNOSIS — K83.8 DILATED BILE DUCT: Primary | ICD-10-CM

## 2025-06-17 PROBLEM — E43 SEVERE PROTEIN-CALORIE MALNUTRITION (HCC): Status: ACTIVE | Noted: 2025-06-17

## 2025-06-17 LAB
ALBUMIN SERPL BCG-MCNC: 3.4 G/DL (ref 3.5–5)
ALP SERPL-CCNC: 292 U/L (ref 34–104)
ALT SERPL W P-5'-P-CCNC: 54 U/L (ref 7–52)
ANION GAP SERPL CALCULATED.3IONS-SCNC: 4 MMOL/L (ref 4–13)
AST SERPL W P-5'-P-CCNC: 60 U/L (ref 13–39)
BILIRUB SERPL-MCNC: 0.43 MG/DL (ref 0.2–1)
BUN SERPL-MCNC: 11 MG/DL (ref 5–25)
CALCIUM ALBUM COR SERPL-MCNC: 9.6 MG/DL (ref 8.3–10.1)
CALCIUM SERPL-MCNC: 9.1 MG/DL (ref 8.4–10.2)
CHLORIDE SERPL-SCNC: 98 MMOL/L (ref 96–108)
CO2 SERPL-SCNC: 35 MMOL/L (ref 21–32)
CREAT SERPL-MCNC: 0.85 MG/DL (ref 0.6–1.3)
GFR SERPL CREATININE-BSD FRML MDRD: 76 ML/MIN/1.73SQ M
GLUCOSE SERPL-MCNC: 101 MG/DL (ref 65–140)
POTASSIUM SERPL-SCNC: 3.9 MMOL/L (ref 3.5–5.3)
PROT SERPL-MCNC: 6.3 G/DL (ref 6.4–8.4)
SODIUM SERPL-SCNC: 137 MMOL/L (ref 135–147)

## 2025-06-17 PROCEDURE — 88366 INSITU HYBRIDIZATION (FISH): CPT | Performed by: INTERNAL MEDICINE

## 2025-06-17 PROCEDURE — BF101ZZ FLUOROSCOPY OF BILE DUCTS USING LOW OSMOLAR CONTRAST: ICD-10-PCS | Performed by: INTERNAL MEDICINE

## 2025-06-17 PROCEDURE — 88305 TISSUE EXAM BY PATHOLOGIST: CPT | Performed by: PATHOLOGY

## 2025-06-17 PROCEDURE — 74328 X-RAY BILE DUCT ENDOSCOPY: CPT

## 2025-06-17 PROCEDURE — 88341 IMHCHEM/IMCYTCHM EA ADD ANTB: CPT | Performed by: PATHOLOGY

## 2025-06-17 PROCEDURE — 0F798DZ DILATION OF COMMON BILE DUCT WITH INTRALUMINAL DEVICE, VIA NATURAL OR ARTIFICIAL OPENING ENDOSCOPIC: ICD-10-PCS | Performed by: INTERNAL MEDICINE

## 2025-06-17 PROCEDURE — 80053 COMPREHEN METABOLIC PANEL: CPT | Performed by: INTERNAL MEDICINE

## 2025-06-17 PROCEDURE — 88342 IMHCHEM/IMCYTCHM 1ST ANTB: CPT | Performed by: PATHOLOGY

## 2025-06-17 PROCEDURE — 88112 CYTOPATH CELL ENHANCE TECH: CPT | Performed by: PATHOLOGY

## 2025-06-17 PROCEDURE — C1889 IMPLANT/INSERT DEVICE, NOC: HCPCS

## 2025-06-17 PROCEDURE — C2617 STENT, NON-COR, TEM W/O DEL: HCPCS

## 2025-06-17 PROCEDURE — 43274 ERCP DUCT STENT PLACEMENT: CPT | Performed by: INTERNAL MEDICINE

## 2025-06-17 PROCEDURE — 99232 SBSQ HOSP IP/OBS MODERATE 35: CPT | Performed by: STUDENT IN AN ORGANIZED HEALTH CARE EDUCATION/TRAINING PROGRAM

## 2025-06-17 PROCEDURE — 0FD98ZX EXTRACTION OF COMMON BILE DUCT, VIA NATURAL OR ARTIFICIAL OPENING ENDOSCOPIC, DIAGNOSTIC: ICD-10-PCS | Performed by: INTERNAL MEDICINE

## 2025-06-17 RX ORDER — SODIUM CHLORIDE, SODIUM LACTATE, POTASSIUM CHLORIDE, CALCIUM CHLORIDE 600; 310; 30; 20 MG/100ML; MG/100ML; MG/100ML; MG/100ML
125 INJECTION, SOLUTION INTRAVENOUS CONTINUOUS
Status: DISCONTINUED | OUTPATIENT
Start: 2025-06-17 | End: 2025-06-17 | Stop reason: SDUPTHER

## 2025-06-17 RX ORDER — FENTANYL CITRATE 50 UG/ML
INJECTION, SOLUTION INTRAMUSCULAR; INTRAVENOUS AS NEEDED
Status: DISCONTINUED | OUTPATIENT
Start: 2025-06-17 | End: 2025-06-17

## 2025-06-17 RX ORDER — POLYETHYLENE GLYCOL 3350 17 G/17G
17 POWDER, FOR SOLUTION ORAL DAILY PRN
Status: DISCONTINUED | OUTPATIENT
Start: 2025-06-17 | End: 2025-06-18

## 2025-06-17 RX ORDER — OXYCODONE HYDROCHLORIDE 5 MG/1
5 TABLET ORAL EVERY 4 HOURS PRN
Refills: 0 | Status: DISCONTINUED | OUTPATIENT
Start: 2025-06-17 | End: 2025-06-18 | Stop reason: HOSPADM

## 2025-06-17 RX ORDER — INDOMETHACIN 50 MG/1
SUPPOSITORY RECTAL AS NEEDED
Status: DISCONTINUED | OUTPATIENT
Start: 2025-06-17 | End: 2025-06-17 | Stop reason: HOSPADM

## 2025-06-17 RX ORDER — ROCURONIUM BROMIDE 10 MG/ML
INJECTION, SOLUTION INTRAVENOUS AS NEEDED
Status: DISCONTINUED | OUTPATIENT
Start: 2025-06-17 | End: 2025-06-17

## 2025-06-17 RX ORDER — DEXAMETHASONE SODIUM PHOSPHATE 10 MG/ML
INJECTION, SOLUTION INTRAMUSCULAR; INTRAVENOUS AS NEEDED
Status: DISCONTINUED | OUTPATIENT
Start: 2025-06-17 | End: 2025-06-17

## 2025-06-17 RX ORDER — PROPOFOL 10 MG/ML
INJECTION, EMULSION INTRAVENOUS AS NEEDED
Status: DISCONTINUED | OUTPATIENT
Start: 2025-06-17 | End: 2025-06-17

## 2025-06-17 RX ORDER — SODIUM CHLORIDE, SODIUM LACTATE, POTASSIUM CHLORIDE, CALCIUM CHLORIDE 600; 310; 30; 20 MG/100ML; MG/100ML; MG/100ML; MG/100ML
125 INJECTION, SOLUTION INTRAVENOUS CONTINUOUS
Status: DISCONTINUED | OUTPATIENT
Start: 2025-06-17 | End: 2025-06-17

## 2025-06-17 RX ORDER — LIDOCAINE HYDROCHLORIDE 20 MG/ML
INJECTION, SOLUTION EPIDURAL; INFILTRATION; INTRACAUDAL; PERINEURAL AS NEEDED
Status: DISCONTINUED | OUTPATIENT
Start: 2025-06-17 | End: 2025-06-17

## 2025-06-17 RX ORDER — HYDROMORPHONE HCL IN WATER/PF 6 MG/30 ML
0.2 PATIENT CONTROLLED ANALGESIA SYRINGE INTRAVENOUS EVERY 4 HOURS PRN
Status: DISCONTINUED | OUTPATIENT
Start: 2025-06-17 | End: 2025-06-18 | Stop reason: HOSPADM

## 2025-06-17 RX ORDER — SODIUM CHLORIDE, SODIUM LACTATE, POTASSIUM CHLORIDE, CALCIUM CHLORIDE 600; 310; 30; 20 MG/100ML; MG/100ML; MG/100ML; MG/100ML
125 INJECTION, SOLUTION INTRAVENOUS CONTINUOUS
Status: CANCELLED | OUTPATIENT
Start: 2025-06-17

## 2025-06-17 RX ADMIN — HYDROMORPHONE HYDROCHLORIDE 0.2 MG: 0.2 INJECTION, SOLUTION INTRAMUSCULAR; INTRAVENOUS; SUBCUTANEOUS at 23:34

## 2025-06-17 RX ADMIN — ROCURONIUM 40 MG: 50 INJECTION, SOLUTION INTRAVENOUS at 16:00

## 2025-06-17 RX ADMIN — BUSPIRONE HYDROCHLORIDE 15 MG: 10 TABLET ORAL at 17:46

## 2025-06-17 RX ADMIN — LIDOCAINE HYDROCHLORIDE 60 MG: 20 INJECTION, SOLUTION EPIDURAL; INFILTRATION; INTRACAUDAL at 15:59

## 2025-06-17 RX ADMIN — BUSPIRONE HYDROCHLORIDE 15 MG: 10 TABLET ORAL at 08:50

## 2025-06-17 RX ADMIN — HYDROMORPHONE HYDROCHLORIDE 0.2 MG: 0.2 INJECTION, SOLUTION INTRAMUSCULAR; INTRAVENOUS; SUBCUTANEOUS at 06:45

## 2025-06-17 RX ADMIN — HYDROMORPHONE HYDROCHLORIDE 0.2 MG: 0.2 INJECTION, SOLUTION INTRAMUSCULAR; INTRAVENOUS; SUBCUTANEOUS at 19:20

## 2025-06-17 RX ADMIN — GABAPENTIN 800 MG: 400 CAPSULE ORAL at 22:00

## 2025-06-17 RX ADMIN — ONDANSETRON 4 MG: 2 INJECTION INTRAMUSCULAR; INTRAVENOUS at 16:31

## 2025-06-17 RX ADMIN — BUSPIRONE HYDROCHLORIDE 15 MG: 10 TABLET ORAL at 22:00

## 2025-06-17 RX ADMIN — HEPARIN SODIUM 5000 UNITS: 5000 INJECTION INTRAVENOUS; SUBCUTANEOUS at 05:26

## 2025-06-17 RX ADMIN — PROPOFOL 150 MG: 10 INJECTION, EMULSION INTRAVENOUS at 15:59

## 2025-06-17 RX ADMIN — HYDROMORPHONE HYDROCHLORIDE 0.2 MG: 0.2 INJECTION, SOLUTION INTRAMUSCULAR; INTRAVENOUS; SUBCUTANEOUS at 02:03

## 2025-06-17 RX ADMIN — INDOMETHACIN 100 MG: 50 SUPPOSITORY RECTAL at 16:09

## 2025-06-17 RX ADMIN — HEPARIN SODIUM 5000 UNITS: 5000 INJECTION INTRAVENOUS; SUBCUTANEOUS at 22:00

## 2025-06-17 RX ADMIN — FENTANYL CITRATE 50 MCG: 50 INJECTION INTRAMUSCULAR; INTRAVENOUS at 16:04

## 2025-06-17 RX ADMIN — BUDESONIDE AND FORMOTEROL FUMARATE DIHYDRATE 2 PUFF: 160; 4.5 AEROSOL RESPIRATORY (INHALATION) at 08:50

## 2025-06-17 RX ADMIN — OXYCODONE HYDROCHLORIDE 5 MG: 5 TABLET ORAL at 09:48

## 2025-06-17 RX ADMIN — OXYCODONE HYDROCHLORIDE 5 MG: 5 TABLET ORAL at 22:00

## 2025-06-17 RX ADMIN — SUGAMMADEX 200 MG: 100 INJECTION, SOLUTION INTRAVENOUS at 16:29

## 2025-06-17 RX ADMIN — HYDROMORPHONE HYDROCHLORIDE 0.2 MG: 0.2 INJECTION, SOLUTION INTRAMUSCULAR; INTRAVENOUS; SUBCUTANEOUS at 11:01

## 2025-06-17 RX ADMIN — OXYCODONE HYDROCHLORIDE 5 MG: 5 TABLET ORAL at 01:21

## 2025-06-17 RX ADMIN — FENTANYL CITRATE 50 MCG: 50 INJECTION INTRAMUSCULAR; INTRAVENOUS at 15:55

## 2025-06-17 RX ADMIN — DEXAMETHASONE SODIUM PHOSPHATE 10 MG: 10 INJECTION, SOLUTION INTRAMUSCULAR; INTRAVENOUS at 16:03

## 2025-06-17 RX ADMIN — OXYCODONE HYDROCHLORIDE 5 MG: 5 TABLET ORAL at 05:24

## 2025-06-17 RX ADMIN — IOHEXOL 7 ML: 300 INJECTION, SOLUTION INTRAVENOUS at 16:15

## 2025-06-17 RX ADMIN — VENLAFAXINE HYDROCHLORIDE 225 MG: 150 CAPSULE, EXTENDED RELEASE ORAL at 08:50

## 2025-06-17 RX ADMIN — BUDESONIDE AND FORMOTEROL FUMARATE DIHYDRATE 2 PUFF: 160; 4.5 AEROSOL RESPIRATORY (INHALATION) at 21:59

## 2025-06-17 RX ADMIN — GABAPENTIN 800 MG: 400 CAPSULE ORAL at 08:50

## 2025-06-17 RX ADMIN — OXYCODONE HYDROCHLORIDE 5 MG: 5 TABLET ORAL at 17:46

## 2025-06-17 RX ADMIN — Medication 1 PATCH: at 08:50

## 2025-06-17 RX ADMIN — SODIUM CHLORIDE, SODIUM LACTATE, POTASSIUM CHLORIDE, AND CALCIUM CHLORIDE 125 ML/HR: .6; .31; .03; .02 INJECTION, SOLUTION INTRAVENOUS at 14:31

## 2025-06-17 RX ADMIN — SODIUM CHLORIDE, SODIUM LACTATE, POTASSIUM CHLORIDE, AND CALCIUM CHLORIDE 125 ML/HR: .6; .31; .03; .02 INJECTION, SOLUTION INTRAVENOUS at 06:37

## 2025-06-17 RX ADMIN — PANTOPRAZOLE SODIUM 40 MG: 40 TABLET, DELAYED RELEASE ORAL at 05:24

## 2025-06-17 NOTE — ANESTHESIA PREPROCEDURE EVALUATION
Procedure:  ERCP    Relevant Problems   CARDIO   (+) Mixed hyperlipidemia   (+) White coat syndrome without diagnosis of hypertension      GI/HEPATIC   (+) Gastroesophageal reflux disease without esophagitis      NEURO/PSYCH   (+) Chronic right shoulder pain   (+) Generalized anxiety disorder   (+) Recurrent major depressive disorder, in full remission (HCC)      PULMONARY   (+) COPD (chronic obstructive pulmonary disease) ([facility])        Physical Exam    Airway     Mallampati score: III  TM Distance: <3 FB  Neck ROM: full      Cardiovascular  Rhythm: regular, Rate: normal    Dental       Pulmonary   Breath sounds clear to auscultation    Neurological    She appears oriented x3.      Other Findings  post-pubertal.      Anesthesia Plan  ASA Score- 2     Anesthesia Type- general with ASA Monitors.         Additional Monitors:     Airway Plan: Oral ETT.           Plan Factors-Exercise tolerance (METS): >4 METS.    Chart reviewed.   Existing labs reviewed. Patient summary reviewed.    Patient is not a current smoker.              Induction-     Postoperative Plan-     Perioperative Resuscitation Plan - Level 1 - Full Code.       Informed Consent- Anesthetic plan and risks discussed with patient.        NPO Status:  Vitals Value Taken Time   Date of last liquid 06/17/25 06/17/25 15:21   Time of last liquid 0000 06/17/25 15:21   Date of last solid 06/16/25 06/17/25 15:21   Time of last solid 2300 06/17/25 15:21

## 2025-06-17 NOTE — UTILIZATION REVIEW
Continued Stay Review    Date:    6/17                          Current Patient Class: Inpatient  Current Level of Care:  med surg    HPI:54 y.o. female initially admitted on   6/14     Current Diagnosis:   Elevated  LFT's  Dilated  bile duct    Assessment/Plan:   6/17   For MRI with MRCP this pm.   Need serial abdominal exams.   Monitor  labs.  No evidence of cholangitis.    Continue  pain control as needed.      Medications:   Scheduled Medications:  budesonide-formoterol, 2 puff, Inhalation, BID  busPIRone, 15 mg, Oral, TID  gabapentin, 800 mg, Oral, Q12H CHOCO  heparin (porcine), 5,000 Units, Subcutaneous, Q8H CHOCO  nicotine, 1 patch, Transdermal, Daily  pantoprazole, 40 mg, Oral, Early Morning  venlafaxine, 225 mg, Oral, Daily      Continuous IV Infusions:  lactated ringers, 125 mL/hr, Intravenous, Continuous      PRN Meds:  albuterol, 2 puff, Inhalation, Q6H PRN  HYDROmorphone, 0.2 mg, Intravenous, Q4H PRN  (  x3  6/17 thus far)   LORazepam, 1 mg, Intravenous, Once PRN  naloxone, 0.04 mg, Intravenous, Q1MIN PRN  ondansetron, 4 mg, Intravenous, Q4H PRN  oxyCODONE, 5 mg, Oral, Q4H PRN  polyethylene glycol, 17 g, Oral, Daily PRN      Discharge Plan:   TBD    Vital Signs (last 3 days)       Date/Time Temp Pulse Resp BP MAP (mmHg) SpO2 O2 Device Patient Position - Orthostatic VS Melanie Coma Scale Score Pain    06/17/25 1527 97.9 °F (36.6 °C) 102 18 129/82 -- 93 % None (Room air) -- -- 7    06/17/25 1101 -- -- -- -- -- -- -- -- -- 8    06/17/25 0948 -- -- -- -- -- -- -- -- -- 10 - Worst Possible Pain    06/17/25 07:42:37 98.4 °F (36.9 °C) 104 20 144/102 116 98 % -- Lying -- --    06/17/25 0645 -- -- -- -- -- -- -- -- -- 8    06/17/25 0524 -- -- -- -- -- -- -- -- -- 8 06/17/25 0203 -- -- -- -- -- -- -- -- -- 9 06/17/25 0121 -- -- -- -- -- -- -- -- -- 9 06/16/25 22:28:55 98.2 °F (36.8 °C) 113 -- 169/97 121 97 % -- -- -- --    06/16/25 2141 -- -- -- -- -- -- -- -- -- 8 06/16/25 2041 -- -- -- -- -- -- -- -- --  8 06/16/25 1613 -- -- -- -- -- -- -- -- -- 8 06/16/25 16:01:18 98.5 °F (36.9 °C) 107 18 149/102 118 95 % None (Room air) Lying -- --    06/16/25 1427 -- -- -- -- -- -- -- -- -- 7 06/16/25 1052 -- -- -- -- -- -- -- -- -- 8 06/16/25 0812 -- -- -- -- -- -- -- -- -- 7 06/16/25 07:15:16 98.3 °F (36.8 °C) 111 18 155/110 125 96 % None (Room air) Lying -- --    06/16/25 0030 -- -- -- -- -- -- -- -- -- 8 06/16/25 0003 -- -- -- -- -- -- -- -- 15 --    06/15/25 23:03:21 97.6 °F (36.4 °C) 72 18 143/98 113 96 % None (Room air) Lying -- --    06/15/25 2113 -- -- -- -- -- -- -- -- -- 8    06/15/25 1922 -- -- -- -- -- -- -- -- -- 8    06/15/25 15:23:05 98.1 °F (36.7 °C) 107 18 141/92 108 97 % -- -- -- --    06/15/25 1425 -- -- -- -- -- -- -- -- -- 7    06/15/25 1100 -- -- -- -- -- -- -- -- -- 8    06/15/25 07:53:39 98.2 °F (36.8 °C) -- -- 159/97 118 -- -- -- -- --    06/15/25 07:53:14 98.2 °F (36.8 °C) -- -- 159/97 118 -- -- -- -- --    06/15/25 0749 -- -- -- -- -- -- -- -- -- 8    06/15/25 0748 -- -- -- -- -- -- -- -- -- 8    06/15/25 0740 -- -- -- -- -- -- -- -- 15 --    06/15/25 07:25:50 97.5 °F (36.4 °C) -- 16 147/103 118 -- -- -- -- --    06/15/25 0443 -- -- -- -- -- -- -- -- -- 8 06/14/25 23:29:59 98 °F (36.7 °C) 113 18 162/96 118 92 % None (Room air) Lying -- --    06/14/25 2202 -- -- -- -- -- -- -- -- -- 7 06/14/25 2034 -- -- -- -- -- -- -- -- -- 8 06/14/25 2017 -- -- -- -- -- -- -- -- -- 8 06/14/25 2014 -- -- -- -- -- -- -- -- 15 -- 06/14/25 20:07:35 98 °F (36.7 °C) 103 20 159/97 118 96 % None (Room air) -- -- --    06/14/25 1730 -- 102 18 124/93 104 96 % None (Room air) Lying -- --    06/14/25 1632 -- -- -- -- -- -- -- -- -- 7 06/14/25 1613 -- 103 20 145/82 -- 99 % None (Room air) Sitting -- 7 06/14/25 1606 -- 104 -- -- -- -- -- -- -- --    06/14/25 1530 -- 114 18 145/87 110 95 % None (Room air) Sitting -- --    06/14/25 1500 -- 113 20 137/87 105 96 % None (Room air) Sitting -- --     06/14/25 1454 -- -- -- -- -- -- -- -- -- 7    06/14/25 1448 -- -- -- -- -- -- -- -- 15 --    06/14/25 1347 -- 109 -- -- -- -- -- -- -- --    06/14/25 1341 98 °F (36.7 °C) 121 28 117/76 -- 100 % None (Room air) Sitting -- --            Pertinent Labs/Diagnostic Results:   Radiology:  MRI abdomen wo contrast and mrcp   Final Interpretation by Sam Pate MD (06/15 9559)      Motion limited study. Also limited without IV contrast.      Intrahepatic and extrahepatic biliary dilation with abrupt caliber change of the mid CBD at the pancreatic head. Numerous subcentimeter cystic foci within the pancreatic head with diffuse pancreatic head enlargement. Patient may benefit from EUS    evaluation.      The study was marked in EPIC for immediate notification.         Workstation performed: [id number]         US right upper quadrant   Final Interpretation by Romel Polanco MD (06/14 1804)      Dilated CBD. No evidence for cholelithiasis. Correlate with prior CT for additional findings.      Workstation performed: [id number]         CT pe study w abdomen pelvis w contrast   Final Interpretation by Romel Polanco MD (06/14 1803)      No evidence for pulmonary embolism.      Mixed solid and groundglass nodule within the right upper lobe is increased in size since 6/7/2023. Findings are suspicious for adenocarcinoma spectrum lesion.      Circumferential wall thickening of the duodenum suggesting the presence of duodenitis. Underlying neoplastic etiologies are not entirely excluded.      Dilatation of the CBD and pancreatic duct is new since the prior examination. No discrete mass lesion identified on the current examination. MRCP is recommended for further evaluation. 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      Remainder of the findings, as described above.      The study was marked in EPIC  for immediate notification.            Computerized Assisted Algorithm (CAA) may have aided analysis of applicable images.      Workstation performed: [id number]         XR chest portable   Final Interpretation by Joellen Booth MD (06/15 0728)      No acute cardiopulmonary disease.      Right upper lobe nodule not visible. See subsequent chest CT.            Workstation performed: [id number]         FL ERCP biliary only    (Results Pending)     Cardiology:    GI:    Results from last 7 days   Lab Units 06/16/25  0436 06/15/25  0441 06/14/25  1415   WBC Thousand/uL 8.70 10.15 10.79*   HEMOGLOBIN g/dL 10.9* 11.8 12.9   HEMATOCRIT % 32.4* 37.6 38.5   PLATELETS Thousands/uL 296 342 372   TOTAL NEUT ABS Thousands/µL 4.93  --  7.55         Results from last 7 days   Lab Units 06/17/25  0516 06/16/25 0436 06/15/25  0441 06/14/25  1415   SODIUM mmol/L 137 137 136 129*   POTASSIUM mmol/L 3.9 3.6 3.4* 4.0   CHLORIDE mmol/L 98 100 100 97   CO2 mmol/L 35* 32 31 19*   ANION GAP mmol/L 4 5 5 13   BUN mg/dL 11 11 10 12   CREATININE mg/dL 0.85 0.86 1.00 1.07   EGFR ml/min/1.73sq m 76 75 63 58   CALCIUM mg/dL 9.1 8.7 8.3* 8.7     Results from last 7 days   Lab Units 06/17/25  0516 06/16/25 0436 06/15/25  0441 06/14/25  1415   AST U/L 60* 56* 56* 129*   ALT U/L 54* 50 62* 78*   ALK PHOS U/L 292* 277* 310* 377*   TOTAL PROTEIN g/dL 6.3* 5.2* 5.5* 6.2*   ALBUMIN g/dL 3.4* 2.8* 2.9* 3.2*   TOTAL BILIRUBIN mg/dL 0.43 0.32 0.40 0.59   BILIRUBIN DIRECT mg/dL  --  0.07  --   --          Results from last 7 days   Lab Units 06/17/25  0516 06/16/25  0436 06/15/25  0441 06/14/25  1415   GLUCOSE RANDOM mg/dL 101 131 74 196*         Results from last 7 days   Lab Units 06/15/25  0441   HEMOGLOBIN A1C % 6.2*   EAG mg/dl 131             Results from last 7 days   Lab Units 06/14/25  1612 06/14/25  1417   HS TNI 0HR ng/L  --  8   HS TNI 2HR ng/L 5  --    HSTNI D2 ng/L -3  --      Results from last 7 days   Lab Units 06/14/25  1415   D-DIMER  QUANTITATIVE ug/ml FEU 1.72*     Results from last 7 days   Lab Units 06/16/25  0436 06/14/25  1415   PROTIME seconds 11.7* 12.1*   INR  0.84* 0.87   PTT seconds  --  24         Results from last 7 days   Lab Units 06/14/25  1415   PROCALCITONIN ng/ml 0.15     Results from last 7 days   Lab Units 06/14/25  1612 06/14/25  1415   LACTIC ACID mmol/L 1.8 3.4*             Results from last 7 days   Lab Units 06/14/25  1415   BNP pg/mL 65     Results from last 7 days   Lab Units 06/15/25  0441   FERRITIN ng/mL 219   IRON SATURATION % 43   IRON ug/dL 90   TIBC ug/dL 210*     Results from last 7 days   Lab Units 06/15/25  0441   TRANSFERRIN mg/dL 150*         Results from last 7 days   Lab Units 06/15/25  0810   HEP B S AG  Non-reactive   HEP C AB  Non-reactive   HEP B C IGM  Non-reactive     Results from last 7 days   Lab Units 06/14/25  1415   LIPASE u/L 168*                                 Results from last 7 days   Lab Units 06/15/25  0810   ACETAMINOPHEN LVL ug/mL 5*                 Results from last 7 days   Lab Units 06/14/25  1451   BLOOD CULTURE  No Growth at 48 hrs.  No Growth at 48 hrs.                   Network Utilization Review Department  ATTENTION: Please call with any questions or concerns to [phone] and carefully listen to the prompts so that you are directed to the right person. All voicemails are confidential.   For Discharge needs, contact Care Management DC Support Team at [phone] opt. [phone]  Send all requests for admission clinical reviews, approved or denied determinations and any other requests to dedicated fax number below belonging to the campus where the patient is receiving treatment. List of dedicated fax numbers for the Facilities:  FACILITY NAME UR FAX NUMBER   ADMISSION DENIALS (Administrative/Medical Necessity) [phone]   DISCHARGE SUPPORT TEAM (NETWORK) [phone]   PARENT CHILD HEALTH (Maternity/NICU/Pediatrics) [phone]   [facility]  [phone]   [facility] [phone]   [facility] [phone]   [facility] [phone]   [facility] [phone]   [facility] [phone]   [facility] [phone]   [facility] [phone]   [facility] [phone]   [facility] [phone]   [facility] [phone]   [facility] [phone]

## 2025-06-17 NOTE — PLAN OF CARE
Problem: PAIN - ADULT  Goal: Verbalizes/displays adequate comfort level or baseline comfort level  Description: Interventions:  - Encourage patient to monitor pain and request assistance  - Assess pain using appropriate pain scale  - Administer analgesics as ordered based on type and severity of pain and evaluate response  - Implement non-pharmacological measures as appropriate and evaluate response  - Consider cultural and social influences on pain and pain management  - Notify physician/advanced practitioner if interventions unsuccessful or patient reports new pain  - Educate patient/family on pain management process including their role and importance of  reporting pain   - Provide non-pharmacologic/complimentary pain relief interventions  Outcome: Progressing     Problem: INFECTION - ADULT  Goal: Absence or prevention of progression during hospitalization  Description: INTERVENTIONS:  - Assess and monitor for signs and symptoms of infection  - Monitor lab/diagnostic results  - Monitor all insertion sites, i.e. indwelling lines, tubes, and drains  - Monitor endotracheal if appropriate and nasal secretions for changes in amount and color  - [place] appropriate cooling/warming therapies per order  - Administer medications as ordered  - Instruct and encourage patient and family to use good hand hygiene technique  - Identify and instruct in appropriate isolation precautions for identified infection/condition  Outcome: Progressing     Problem: SAFETY ADULT  Goal: Patient will remain free of falls  Description: INTERVENTIONS:  - Educate patient/family on patient safety including physical limitations  - Instruct patient to call for assistance with activity   - Consider consulting OT/PT to assist with strengthening/mobility based on AM PAC & JH-HLM score  - Consult OT/PT to assist with strengthening/mobility   - Keep Call bell within reach  - Keep bed low and locked with side rails adjusted as appropriate  - Keep  care items and personal belongings within reach  - Initiate and maintain comfort rounds  - Make Fall Risk Sign visible to staff  - Offer Toileting every Hours, in advance of need  - Initiate/Maintain alarm  - Obtain necessary fall risk management equipment:   - Apply yellow socks and bracelet for high fall risk patients  - Consider moving patient to room near nurses station  Outcome: Progressing     Problem: DISCHARGE PLANNING  Goal: Discharge to home or other facility with appropriate resources  Description: INTERVENTIONS:  - Identify barriers to discharge w/patient and caregiver  - Arrange for needed discharge resources and transportation as appropriate  - Identify discharge learning needs (meds, wound care, etc.)  - Arrange for interpretive services to assist at discharge as needed  - Refer to Case Management Department for coordinating discharge planning if the patient needs post-hospital services based on physician/advanced practitioner order or complex needs related to functional status, cognitive ability, or social support system  Outcome: Progressing     Problem: Knowledge Deficit  Goal: Patient/family/caregiver demonstrates understanding of disease process, treatment plan, medications, and discharge instructions  Description: Complete learning assessment and assess knowledge base.  Interventions:  - Provide teaching at level of understanding  - Provide teaching via preferred learning methods  Outcome: Progressing     Problem: Nutrition/Hydration-ADULT  Goal: Nutrient/Hydration intake appropriate for improving, restoring or maintaining nutritional needs  Description: Monitor and assess patient's nutrition/hydration status for malnutrition. Collaborate with interdisciplinary team and initiate plan and interventions as ordered.  Monitor patient's weight and dietary intake as ordered or per policy. Utilize nutrition screening tool and intervene as necessary. Determine patient's food preferences and provide  high-protein, high-caloric foods as appropriate.     INTERVENTIONS:  - Monitor oral intake, urinary output, labs, and treatment plans  - Assess nutrition and hydration status and recommend course of action  - Evaluate amount of meals eaten  - Assist patient with eating if necessary   - Allow adequate time for meals  - Recommend/ encourage appropriate diets, oral nutritional supplements, and vitamin/mineral supplements  - Order, calculate, and assess calorie counts as needed  - Recommend, monitor, and adjust tube feedings and TPN/PPN based on assessed needs  - Assess need for intravenous fluids  - Provide specific nutrition/hydration education as appropriate  - Include patient/family/caregiver in decisions related to nutrition  Outcome: Progressing

## 2025-06-17 NOTE — ASSESSMENT & PLAN NOTE
"History of COPD mood disorder tobacco use and neuropathy who presents to the hospital for worsening right-sided abdominal/flank pains  In the ED underwent ultrasound and CT.  No evidence of cholecystitis but there are concerning findings for dilatation of CBD/pancreatic ducts  MRI showed \"Intrahepatic and extrahepatic biliary dilation with abrupt caliber change of the mid CBD at the pancreatic head. Numerous subcentimeter cystic foci within the pancreatic head with diffuse pancreatic head enlargement.\"  NPO awaiting ERCP  IV fluids  Results from last 7 days   Lab Units 06/17/25  0516 06/16/25  0436 06/15/25  0441 06/14/25  1415   AST U/L 60* 56* 56* 129*   ALT U/L 54* 50 62* 78*   TOTAL BILIRUBIN mg/dL 0.43 0.32 0.40 0.59     "

## 2025-06-17 NOTE — ASSESSMENT & PLAN NOTE
No history of diabetes mellitus.  Monitor with a.m. labs and recheck hemoglobin A1c    Results from last 7 days   Lab Units 06/17/25  0516 06/16/25  0436 06/15/25  0441 06/14/25  1415   GLUCOSE RANDOM mg/dL 101 131 74 196*   HEMOGLOBIN A1C %  --   --  6.2*  --

## 2025-06-17 NOTE — QUICK NOTE
Night Progress Note 06/17/25:  Paged regarding pain. On exam patient appeared uncomfortable, tearful, frustrated that regimen she was controlled on was adjusted. Abdomen soft with RUQ tenderness.     Assessment & Plan:  Abdominal pain, dilated bile duct  Plan:  Multimodal analgesia   Pending EUS/ERCP

## 2025-06-17 NOTE — INTERVAL H&P NOTE
H&P reviewed. After examining the patient I find no changes in the patients condition since the H&P had been written.    Vitals:    06/17/25 1527   BP: 129/82   Pulse: 102   Resp: 18   Temp: 97.9 °F (36.6 °C)   SpO2: 93%

## 2025-06-17 NOTE — ASSESSMENT & PLAN NOTE
resolved    Results from last 7 days   Lab Units 06/17/25  0516 06/16/25  0436 06/15/25  0441 06/14/25  1415   SODIUM mmol/L 137 137 136 129*

## 2025-06-17 NOTE — ANESTHESIA POSTPROCEDURE EVALUATION
Post-Op Assessment Note    CV Status:  Stable    Pain management: adequate       Mental Status:  Alert and awake   Hydration Status:  Euvolemic   PONV Controlled:  Controlled   Airway Patency:  Patent  Airway: intubated     Post Op Vitals Reviewed: Yes    No anethesia notable event occurred.    Staff: Anesthesiologist, CRNA           Last Filed PACU Vitals:  Vitals Value Taken Time   Temp 98 °F (36.7 °C) 06/17/25 16:46   Pulse 115 06/17/25 16:46   /104 06/17/25 16:49   Resp 16 06/17/25 16:46   SpO2 100 % 06/17/25 16:46       Modified Dilcia:     Vitals Value Taken Time   Activity 2 06/17/25 16:51   Respiration 2 06/17/25 16:51   Circulation 2 06/17/25 16:51   Consciousness 2 06/17/25 16:51   Oxygen Saturation 2 06/17/25 16:51     Modified Dilcia Score: 10

## 2025-06-17 NOTE — PROGRESS NOTES
"Progress Note - Hospitalist   Name: Nuris Infante 54 y.o. female I MRN: [id number]  Unit/Bed#: E5 -01 I Date of Admission: 6/14/2025   Date of Service: 6/17/2025 I Hospital Day: 3     Assessment & Plan  Dilated bile duct  History of COPD mood disorder tobacco use and neuropathy who presents to the hospital for worsening right-sided abdominal/flank pains  In the ED underwent ultrasound and CT.  No evidence of cholecystitis but there are concerning findings for dilatation of CBD/pancreatic ducts  MRI showed \"Intrahepatic and extrahepatic biliary dilation with abrupt caliber change of the mid CBD at the pancreatic head. Numerous subcentimeter cystic foci within the pancreatic head with diffuse pancreatic head enlargement.\"  NPO awaiting ERCP  IV fluids  Results from last 7 days   Lab Units 06/17/25  0516 06/16/25  0436 06/15/25  0441 06/14/25  1415   AST U/L 60* 56* 56* 129*   ALT U/L 54* 50 62* 78*   TOTAL BILIRUBIN mg/dL 0.43 0.32 0.40 0.59     Recurrent major depressive disorder, in full remission (HCC)  Follows with psychiatry as an outpatient  Mood stable  Continue venlafaxine and buspirone  COPD (chronic obstructive pulmonary disease) (HCC)  COPD without exacerbation.  Continue Symbicort and albuterol as needed  Pulmonary nodule 1 cm or greater in diameter  CT pe study w abdomen pelvis w contrast: Mixed solid and groundglass nodule within the right upper lobe is increased in size since 6/7/2023. Findings are suspicious for adenocarcinoma spectrum lesion.   Ambulatory referral placed for outpatient pulmonology evaluation  Hyponatremia  resolved    Results from last 7 days   Lab Units 06/17/25  0516 06/16/25  0436 06/15/25  0441 06/14/25  1415   SODIUM mmol/L 137 137 136 129*     Hyperglycemia  No history of diabetes mellitus.  Monitor with a.m. labs and recheck hemoglobin A1c    Results from last 7 days   Lab Units 06/17/25  0516 06/16/25  0436 06/15/25  0441 06/14/25  1415   GLUCOSE RANDOM mg/dL 101 131 74 " 196*   HEMOGLOBIN A1C %  --   --  6.2*  --      Tobacco abuse  Nicotine patch ordered during hospitalization  Gastroesophageal reflux disease without esophagitis  Continue PPI  Peroneal neuropathy, right  Continue gabapentin  Elevated liver function tests  Trending down.  MRCP results reviewed  Plan for ERCP due to potential sources of obstruction  Severe protein-calorie malnutrition (HCC)  Malnutrition Findings:   Adult Malnutrition type: Chronic illness  Adult Degree of Malnutrition: Other severe protein calorie malnutrition  Malnutrition Characteristics: Muscle loss, Weight loss     360 Statement: other severe protein calorie malnutrition in setting of chronic illness, evidenced by severe muscle loss/indented temples, protruding clavicles, wt loss 31% wt loss x 12 months, 24 63 kg (139 lb) to 25 Last Wt: 43.4 kg (95 lb 10.9 oz), treated with diet and supplements    BMI Findings:  Adult BMI Classifications: Underweight < 18.5     Body mass index is 15.44 kg/m².       VTE Pharmacologic Prophylaxis:   Pharmacologic: Heparin  Mechanical VTE Prophylaxis in Place: Yes    Current Length of Stay: 3 day(s)    Current Patient Status: Inpatient   Certification Statement: The patient will continue to require additional inpatient hospital stay due to pending ERCP    Discharge Plan: pending    Code Status: Level 1 - Full Code      Subjective:   No events overnight.  Reports doing well today.  Pain is currently controlled on current regimen.  N.p.o. awaiting ERCP today.    Objective:     Vitals:   Temp (24hrs), Av.3 °F (36.8 °C), Min:97.9 °F (36.6 °C), Max:98.5 °F (36.9 °C)    Temp:  [97.9 °F (36.6 °C)-98.5 °F (36.9 °C)] 97.9 °F (36.6 °C)  HR:  [102-113] 102  Resp:  [18-20] 18  BP: (129-169)/() 129/82  SpO2:  [93 %-98 %] 93 %  Body mass index is 15.44 kg/m².     Input and Output Summary (last 24 hours):       Intake/Output Summary (Last 24 hours) at 2025 1551  Last data filed at 2025 1431  Gross  per 24 hour   Intake 540 ml   Output --   Net 540 ml       Physical Exam:     Physical Exam  Vitals and nursing note reviewed.   Constitutional:       Appearance: She is not ill-appearing.      Comments: thin   HENT:      Head: Normocephalic.     Eyes:      Conjunctiva/sclera: Conjunctivae normal.       Cardiovascular:      Rate and Rhythm: Normal rate.   Pulmonary:      Effort: Pulmonary effort is normal.      Breath sounds: No wheezing.   Abdominal:      General: Bowel sounds are normal. There is no distension.      Palpations: Abdomen is soft.     Musculoskeletal:         General: No swelling.      Right lower leg: No edema.      Left lower leg: No edema.     Skin:     General: Skin is warm and dry.     Neurological:      General: No focal deficit present.      Mental Status: She is alert. Mental status is at baseline.       Additional Data:     Labs:    Results from last 7 days   Lab Units 06/16/25  0436   WBC Thousand/uL 8.70   HEMOGLOBIN g/dL 10.9*   HEMATOCRIT % 32.4*   PLATELETS Thousands/uL 296   SEGS PCT % 57   LYMPHO PCT % 35   MONO PCT % 5   EOS PCT % 2     Results from last 7 days   Lab Units 06/17/25  0516   SODIUM mmol/L 137   POTASSIUM mmol/L 3.9   CHLORIDE mmol/L 98   CO2 mmol/L 35*   BUN mg/dL 11   CREATININE mg/dL 0.85   ANION GAP mmol/L 4   CALCIUM mg/dL 9.1   ALBUMIN g/dL 3.4*   TOTAL BILIRUBIN mg/dL 0.43   ALK PHOS U/L 292*   ALT U/L 54*   AST U/L 60*   GLUCOSE RANDOM mg/dL 101     Results from last 7 days   Lab Units 06/16/25  0436   INR  0.84*         Results from last 7 days   Lab Units 06/15/25  0441   HEMOGLOBIN A1C % 6.2*     Results from last 7 days   Lab Units 06/14/25  1612 06/14/25  1415   LACTIC ACID mmol/L 1.8 3.4*   PROCALCITONIN ng/ml  --  0.15           * I Have Reviewed All Lab Data Listed Above.  * Additional Pertinent Lab Tests Reviewed: All Labs For Current Hospital Admission Reviewed    Mobility:  Basic Mobility Inpatient Raw Score: 24  -[facility] Goal: 8: Walk 250 feet or  more  -[facility] Achieved: 8: Walk 250 feet ot more    Lines:     Invasive Devices       Peripheral Intravenous Line  Duration             Peripheral IV 06/16/25 Left;Upper;Ventral (anterior) Arm 1 day                       Imaging:    Imaging Reports Reviewed Today Include:     MRI abdomen wo contrast and mrcp  Result Date: 6/15/2025  Impression: Motion limited study. Also limited without IV contrast. Intrahepatic and extrahepatic biliary dilation with abrupt caliber change of the mid CBD at the pancreatic head. Numerous subcentimeter cystic foci within the pancreatic head with diffuse pancreatic head enlargement. Patient may benefit from EUS evaluation. The study was marked in EPIC for immediate notification. Workstation performed: [id number]     XR chest portable  Result Date: 6/15/2025  Impression: No acute cardiopulmonary disease. Right upper lobe nodule not visible. See subsequent chest CT. Workstation performed: [id number]     US right upper quadrant  Result Date: 6/14/2025  Impression: Dilated CBD. No evidence for cholelithiasis. Correlate with prior CT for additional findings. Workstation performed: [id number]     CT pe study w abdomen pelvis w contrast  Result Date: 6/14/2025  Impression: No evidence for pulmonary embolism. Mixed solid and groundglass nodule within the right upper lobe is increased in size since 6/7/2023. Findings are suspicious for adenocarcinoma spectrum lesion. Circumferential wall thickening of the duodenum suggesting the presence of duodenitis. Underlying neoplastic etiologies are not entirely excluded. Dilatation of the CBD and pancreatic duct is new since the prior examination. No discrete mass lesion identified on the current examination. MRCP is recommended for further evaluation. Patient is status post surgery for reported pancreatic pseudocysts and if any prior imaging is made available for review a direct comparison addendum could be performed. Remainder of the findings, as  described above. The study was marked in EPIC for immediate notification. Computerized Assisted Algorithm (CAA) may have aided analysis of applicable images. Workstation performed: [id number]         Recent Cultures (last 7 days):     Results from last 7 days   Lab Units 06/14/25  1451   BLOOD CULTURE  No Growth at 48 hrs.  No Growth at 48 hrs.       Last 24 Hours Medication List:   Current Facility-Administered Medications   Medication Dose Route Frequency Provider Last Rate    albuterol  2 puff Inhalation Q6H PRN Atul Adan, DO      budesonide-formoterol  2 puff Inhalation BID Atul Adan, DO      busPIRone  15 mg Oral TID Atul Adan, DO      gabapentin  800 mg Oral Q12H CHOCO Atul Adan, DO      heparin (porcine)  5,000 Units Subcutaneous Q8H CHOCO Atul Adan, DO      HYDROmorphone  0.2 mg Intravenous Q4H PRN Geronimo Ramirez PA-C      lactated ringers  125 mL/hr Intravenous Continuous Driss Pierre  mL/hr (06/17/25 1551)    LORazepam  1 mg Intravenous Once PRN Atul Adan, DO      naloxone  0.04 mg Intravenous Q1MIN PRN Geronimo Ramirez PA-C      nicotine  1 patch Transdermal Daily Atul Adan, DO      ondansetron  4 mg Intravenous Q4H PRN Atul Adan, DO      oxyCODONE  5 mg Oral Q4H PRN Geronimo Ramirez PA-C      pantoprazole  40 mg Oral Early Morning Atul Adan, DO      polyethylene glycol  17 g Oral Daily PRN Geronimo Ramirez PA-C      venlafaxine  225 mg Oral Daily Atul Adan DO          Today, Patient Was Seen By: Inder Gomes MD    ** Please Note: Dictation voice to text software may have been used in the creation of this document. **

## 2025-06-17 NOTE — ASSESSMENT & PLAN NOTE
Malnutrition Findings:   Adult Malnutrition type: Chronic illness  Adult Degree of Malnutrition: Other severe protein calorie malnutrition  Malnutrition Characteristics: Muscle loss, Weight loss     360 Statement: other severe protein calorie malnutrition in setting of chronic illness, evidenced by severe muscle loss/indented temples, protruding clavicles, wt loss 31% wt loss x 12 months, 06/05/24 63 kg (139 lb) to 6/14/25 Last Wt: 43.4 kg (95 lb 10.9 oz), treated with diet and supplements    BMI Findings:  Adult BMI Classifications: Underweight < 18.5     Body mass index is 15.44 kg/m².

## 2025-06-17 NOTE — ASSESSMENT & PLAN NOTE
Continue PPI   1.3 K/UL    Eosinophils Absolute 0.0 0.0 - 0.8 K/UL    Basophils Absolute 0.0 0.0 - 0.2 K/UL    Immature Granulocytes Absolute 0.0 0.0 - 0.5 K/UL    Differential Type AUTOMATED     Comprehensive Metabolic Panel    Collection Time: 10/29/24  2:29 PM   Result Value Ref Range    Sodium 138 136 - 145 mmol/L    Potassium 2.5 (L) 3.5 - 5.1 mmol/L    Chloride 96 (L) 98 - 107 mmol/L    CO2 33 (H) 20 - 29 mmol/L    Anion Gap 9 7 - 16 mmol/L    Glucose 70 70 - 99 mg/dL    BUN 8 6 - 23 MG/DL    Creatinine 0.74 0.60 - 1.10 MG/DL    Est, Glom Filt Rate >90 >60 ml/min/1.73m2    Calcium 9.5 8.8 - 10.2 MG/DL    Total Bilirubin 0.4 0.0 - 1.2 MG/DL    ALT 16 8 - 45 U/L    AST 27 15 - 37 U/L    Alk Phosphatase 85 35 - 104 U/L    Total Protein 8.3 (H) 6.3 - 8.2 g/dL    Albumin 4.2 3.5 - 5.0 g/dL    Globulin 4.2 (H) 2.3 - 3.5 g/dL    Albumin/Globulin Ratio 1.0 1.0 - 1.9         Imaging:  No results found for this or any previous visit.    ASSESSMENT/PLAN:   Diagnosis Orders   1. Microcytic anemia        2. Iron deficiency        3. History of pulmonary embolism          51 y.o. F consulted for PE presented to [facility] on 7/30/2024.  She was admitted to [facility] [place] [facility] in 4/2024 for left ICA aneurysm stent, placed on aspirin Plavix, admitted to [facility] less than 2 weeks later with chest pain and found subsegmental PE, aspirin was stopped and Eliquis was started, presented to [facility] on 7/30/2024, complaining of extensive bruising, hemoglobin trending down, MCV decreased, concern of high bleeding risk and stop Eliquis given she has finished 3 months of anticoagulation for a very small PE which was probably provoked, discussed with the option of hypercoagulability workup and decided not to pursue given the risk of long-term anticoagulation in her case since she needs indefinite antiplatelets, arrange Integra for 3 months but repeat lab 10/29/2024 showed persistent anemia and iron deficiency, arrange iron infusion and repeat lab in 2

## 2025-06-18 ENCOUNTER — TRANSITIONAL CARE MANAGEMENT (OUTPATIENT)
Dept: FAMILY MEDICINE CLINIC | Facility: CLINIC | Age: 55
End: 2025-06-18

## 2025-06-18 ENCOUNTER — TELEPHONE (OUTPATIENT)
Dept: FAMILY MEDICINE CLINIC | Facility: CLINIC | Age: 55
End: 2025-06-18

## 2025-06-18 VITALS
RESPIRATION RATE: 19 BRPM | WEIGHT: 95.68 LBS | BODY MASS INDEX: 15.38 KG/M2 | OXYGEN SATURATION: 91 % | HEART RATE: 117 BPM | SYSTOLIC BLOOD PRESSURE: 158 MMHG | HEIGHT: 66 IN | DIASTOLIC BLOOD PRESSURE: 110 MMHG | TEMPERATURE: 98.5 F

## 2025-06-18 LAB
ALBUMIN SERPL BCG-MCNC: 3.1 G/DL (ref 3.5–5)
ALP SERPL-CCNC: 269 U/L (ref 34–104)
ALT SERPL W P-5'-P-CCNC: 49 U/L (ref 7–52)
ANION GAP SERPL CALCULATED.3IONS-SCNC: 7 MMOL/L (ref 4–13)
AST SERPL W P-5'-P-CCNC: 67 U/L (ref 13–39)
BASOPHILS # BLD AUTO: 0.05 THOUSANDS/ÂΜL (ref 0–0.1)
BASOPHILS NFR BLD AUTO: 0 % (ref 0–1)
BILIRUB SERPL-MCNC: 0.51 MG/DL (ref 0.2–1)
BUN SERPL-MCNC: 9 MG/DL (ref 5–25)
CALCIUM ALBUM COR SERPL-MCNC: 9.9 MG/DL (ref 8.3–10.1)
CALCIUM SERPL-MCNC: 9.2 MG/DL (ref 8.4–10.2)
CHLORIDE SERPL-SCNC: 98 MMOL/L (ref 96–108)
CO2 SERPL-SCNC: 31 MMOL/L (ref 21–32)
CREAT SERPL-MCNC: 0.71 MG/DL (ref 0.6–1.3)
DSDNA IGG SERPL IA-ACNC: 1 IU/ML (ref ?–15)
EOSINOPHIL # BLD AUTO: 0.02 THOUSAND/ÂΜL (ref 0–0.61)
EOSINOPHIL NFR BLD AUTO: 0 % (ref 0–6)
ERYTHROCYTE [DISTWIDTH] IN BLOOD BY AUTOMATED COUNT: 13.3 % (ref 11.6–15.1)
GFR SERPL CREATININE-BSD FRML MDRD: 95 ML/MIN/1.73SQ M
GLUCOSE SERPL-MCNC: 110 MG/DL (ref 65–140)
HCT VFR BLD AUTO: 36.7 % (ref 34.8–46.1)
HGB BLD-MCNC: 12 G/DL (ref 11.5–15.4)
IMM GRANULOCYTES # BLD AUTO: 0.04 THOUSAND/UL (ref 0–0.2)
IMM GRANULOCYTES NFR BLD AUTO: 0 % (ref 0–2)
LYMPHOCYTES # BLD AUTO: 2.95 THOUSANDS/ÂΜL (ref 0.6–4.47)
LYMPHOCYTES NFR BLD AUTO: 23 % (ref 14–44)
MCH RBC QN AUTO: 33.3 PG (ref 26.8–34.3)
MCHC RBC AUTO-ENTMCNC: 32.7 G/DL (ref 31.4–37.4)
MCV RBC AUTO: 102 FL (ref 82–98)
MONOCYTES # BLD AUTO: 0.7 THOUSAND/ÂΜL (ref 0.17–1.22)
MONOCYTES NFR BLD AUTO: 6 % (ref 4–12)
NEUTROPHILS # BLD AUTO: 8.83 THOUSANDS/ÂΜL (ref 1.85–7.62)
NEUTS SEG NFR BLD AUTO: 71 % (ref 43–75)
NRBC BLD AUTO-RTO: 0 /100 WBCS
NUCLEAR IGG SER IA-RTO: <0.09 RATIO (ref ?–1)
PLATELET # BLD AUTO: 344 THOUSANDS/UL (ref 149–390)
PMV BLD AUTO: 10.1 FL (ref 8.9–12.7)
POTASSIUM SERPL-SCNC: 3.9 MMOL/L (ref 3.5–5.3)
PROT SERPL-MCNC: 6 G/DL (ref 6.4–8.4)
RBC # BLD AUTO: 3.6 MILLION/UL (ref 3.81–5.12)
SODIUM SERPL-SCNC: 136 MMOL/L (ref 135–147)
WBC # BLD AUTO: 12.59 THOUSAND/UL (ref 4.31–10.16)

## 2025-06-18 PROCEDURE — 80053 COMPREHEN METABOLIC PANEL: CPT | Performed by: STUDENT IN AN ORGANIZED HEALTH CARE EDUCATION/TRAINING PROGRAM

## 2025-06-18 PROCEDURE — 99232 SBSQ HOSP IP/OBS MODERATE 35: CPT | Performed by: INTERNAL MEDICINE

## 2025-06-18 PROCEDURE — 85025 COMPLETE CBC W/AUTO DIFF WBC: CPT | Performed by: STUDENT IN AN ORGANIZED HEALTH CARE EDUCATION/TRAINING PROGRAM

## 2025-06-18 PROCEDURE — 99239 HOSP IP/OBS DSCHRG MGMT >30: CPT | Performed by: STUDENT IN AN ORGANIZED HEALTH CARE EDUCATION/TRAINING PROGRAM

## 2025-06-18 RX ORDER — SENNOSIDES 8.6 MG
1 TABLET ORAL DAILY
Status: DISCONTINUED | OUTPATIENT
Start: 2025-06-18 | End: 2025-06-18 | Stop reason: HOSPADM

## 2025-06-18 RX ORDER — POLYETHYLENE GLYCOL 3350 17 G/17G
17 POWDER, FOR SOLUTION ORAL 2 TIMES DAILY
Status: DISCONTINUED | OUTPATIENT
Start: 2025-06-18 | End: 2025-06-18 | Stop reason: HOSPADM

## 2025-06-18 RX ORDER — OXYCODONE HYDROCHLORIDE 5 MG/1
5 TABLET ORAL EVERY 8 HOURS PRN
Qty: 12 TABLET | Refills: 0 | Status: SHIPPED | OUTPATIENT
Start: 2025-06-18

## 2025-06-18 RX ADMIN — POLYETHYLENE GLYCOL 3350 17 G: 17 POWDER, FOR SOLUTION ORAL at 10:38

## 2025-06-18 RX ADMIN — SENNOSIDES 8.6 MG: 8.6 TABLET, FILM COATED ORAL at 10:38

## 2025-06-18 RX ADMIN — HEPARIN SODIUM 5000 UNITS: 5000 INJECTION INTRAVENOUS; SUBCUTANEOUS at 06:36

## 2025-06-18 RX ADMIN — VENLAFAXINE HYDROCHLORIDE 225 MG: 150 CAPSULE, EXTENDED RELEASE ORAL at 08:28

## 2025-06-18 RX ADMIN — GABAPENTIN 800 MG: 400 CAPSULE ORAL at 08:28

## 2025-06-18 RX ADMIN — OXYCODONE HYDROCHLORIDE 5 MG: 5 TABLET ORAL at 09:41

## 2025-06-18 RX ADMIN — Medication 1 PATCH: at 08:28

## 2025-06-18 RX ADMIN — BUSPIRONE HYDROCHLORIDE 15 MG: 10 TABLET ORAL at 08:28

## 2025-06-18 RX ADMIN — BUDESONIDE AND FORMOTEROL FUMARATE DIHYDRATE 2 PUFF: 160; 4.5 AEROSOL RESPIRATORY (INHALATION) at 08:28

## 2025-06-18 RX ADMIN — PANTOPRAZOLE SODIUM 40 MG: 40 TABLET, DELAYED RELEASE ORAL at 06:36

## 2025-06-18 NOTE — ASSESSMENT & PLAN NOTE
No history of diabetes mellitus.  Monitor with a.m. labs and recheck hemoglobin A1c    Results from last 7 days   Lab Units 06/18/25  0834 06/17/25  0516 06/16/25  0436 06/15/25  0441 06/14/25  1415   GLUCOSE RANDOM mg/dL 110 101 131 74 196*   HEMOGLOBIN A1C %  --   --   --  6.2*  --

## 2025-06-18 NOTE — ASSESSMENT & PLAN NOTE
resolved    Results from last 7 days   Lab Units 06/18/25  0834 06/17/25  0516 06/16/25  0436 06/15/25  0441   SODIUM mmol/L 136 137 137 136

## 2025-06-18 NOTE — ANESTHESIA POSTPROCEDURE EVALUATION
Post-Op Assessment Note    CV Status:  Stable  Pain Score: 1    Pain management: adequate       Mental Status:  Alert and awake   Hydration Status:  Euvolemic   PONV Controlled:  Controlled   Airway Patency:  Patent     Post Op Vitals Reviewed: Yes    No anethesia notable event occurred.    Staff: Anesthesiologist           Last Filed PACU Vitals:  Vitals Value Taken Time   Temp 98.9 °F (37.2 °C) 06/17/25 17:15   Pulse 113 06/17/25 17:15   /101 06/17/25 17:15   Resp 20 06/17/25 17:15   SpO2 97 % 06/17/25 17:15       Modified Dilcia:     Vitals Value Taken Time   Activity 2 06/17/25 16:51   Respiration 2 06/17/25 16:51   Circulation 2 06/17/25 16:51   Consciousness 2 06/17/25 16:51   Oxygen Saturation 2 06/17/25 16:51     Modified Dilcia Score: 10

## 2025-06-18 NOTE — TELEPHONE ENCOUNTER
----- Message from Inder Gomes MD sent at 6/18/2025 12:19 PM EDT -----  Thank you for allowing us to participate in the care of your patient, Nuris Infante, who was hospitalized from 6/14/2025 through 6/18/2025 with the admitting diagnosis of abdominal pain.  Found to have elevated LFTs and dilated common biliary duct on imaging.  Underwent ERCP with sphincterectomy and stent placed due to stricture.  Patient's pain appears improved, has been tolerant diet without nausea or vomiting.  Discharged home to follow outpatient with GI regarding results.  Will need repeat ERCP in 6 weeks for stent removal.If you have any additional questions or would like to discuss further, please feel free to contact me.FERNIE Masont. [facility], [phone]

## 2025-06-18 NOTE — PLAN OF CARE
Problem: INFECTION - ADULT  Goal: Absence or prevention of progression during hospitalization  Description: INTERVENTIONS:  - Assess and monitor for signs and symptoms of infection  - Monitor lab/diagnostic results  - Monitor all insertion sites, i.e. indwelling lines, tubes, and drains  - Monitor endotracheal if appropriate and nasal secretions for changes in amount and color  - [place] appropriate cooling/warming therapies per order  - Administer medications as ordered  - Instruct and encourage patient and family to use good hand hygiene technique  - Identify and instruct in appropriate isolation precautions for identified infection/condition  Outcome: Progressing     Problem: SAFETY ADULT  Goal: Patient will remain free of falls  Description: INTERVENTIONS:  - Educate patient/family on patient safety including physical limitations  - Instruct patient to call for assistance with activity   - Consider consulting OT/PT to assist with strengthening/mobility based on AM PAC & JH-HLM score  - Consult OT/PT to assist with strengthening/mobility   - Keep Call bell within reach  - Keep bed low and locked with side rails adjusted as appropriate  - Keep care items and personal belongings within reach  - Initiate and maintain comfort rounds  - Make Fall Risk Sign visible to staff  - Apply yellow socks and bracelet for high fall risk patients  - Consider moving patient to room near nurses station  Outcome: Progressing     Problem: DISCHARGE PLANNING  Goal: Discharge to home or other facility with appropriate resources  Description: INTERVENTIONS:  - Identify barriers to discharge w/patient and caregiver  - Arrange for needed discharge resources and transportation as appropriate  - Identify discharge learning needs (meds, wound care, etc.)  - Arrange for interpretive services to assist at discharge as needed  - Refer to Case Management Department for coordinating discharge planning if the patient needs post-hospital  services based on physician/advanced practitioner order or complex needs related to functional status, cognitive ability, or social support system  Outcome: Progressing     Problem: Knowledge Deficit  Goal: Patient/family/caregiver demonstrates understanding of disease process, treatment plan, medications, and discharge instructions  Description: Complete learning assessment and assess knowledge base.  Interventions:  - Provide teaching at level of understanding  - Provide teaching via preferred learning methods  Outcome: Progressing

## 2025-06-18 NOTE — ASSESSMENT & PLAN NOTE
54-year-old female with history of COPD, depression, GERD, tobacco use, and prior pancreatic pseudocyst requiring surgical intervention and midline laparotomy incision after failed cystgastrostomy (several years ago) who presented on 6/14 with several weeks of epigastric and right upper quadrant abdominal pain associated with occasional nausea and vomiting found to have elevated liver chemistries including AST//78, alkaline phosphatase 377, and total bilirubin 0.59.  INR 0.87.  Lipase 168.  WBC 10.7 with lactic acidosis of 3.4 which corrected with IV fluid administration.  Pattern of liver chemistry mixed picture.  However, patient did have right upper quadrant ultrasound demonstrating a dilated gallbladder and CBD measuring 10 mm.  No intrahepatic dilation seen or evidence of choledocholithiasis with MRCP with motion artifact, but showing similar findings.  Would also consider DILI in differential and will plan to complete serologic workup given mixed pattern of elevation and history of elevated liver enzymes in the past.  She underwent ERCP on 6/17 notable for mid CBD biliary stricture status post placement of 10 x 3 double flange plastic CBD stent.  Continue to have abdominal pain today likely in setting of constipation.    Plan:  Follow-up serologic workup  Progress diet as tolerated  Start bowel regimen with MiraLAX, senna  Continue to monitor and trend LFTs to ensure improvement  Plan for repeat ERCP in 6 weeks for stent exchange versus removal  Avoid use of hepatotoxic agents  General Surgery consulted and appreciate recommendations  Additional pain and symptom management per primary team

## 2025-06-18 NOTE — DISCHARGE SUMMARY
"Discharge Summary - Hospitalist   Name: Nuris Infante 54 y.o. female I MRN: [id number]  Unit/Bed#: E5 -01 I Date of Admission: 6/14/2025   Date of Service: 6/18/2025 I Hospital Day: 4     Assessment & Plan  Dilated bile duct  History of COPD mood disorder tobacco use and neuropathy who presents to the hospital for worsening right-sided abdominal/flank pains  In the ED underwent ultrasound and CT.  No evidence of cholecystitis but there are concerning findings for dilatation of CBD/pancreatic ducts  MRI showed \"Intrahepatic and extrahepatic biliary dilation with abrupt caliber change of the mid CBD at the pancreatic head. Numerous subcentimeter cystic foci within the pancreatic head with diffuse pancreatic head enlargement.\"  S/p ERCP 06/17 with sphincterectomy, biliary stent and cytology pending on discharge  Diet was advanced and patient had been tolerant  Short course of opioids was given on discharge.  Follow-up with GI outpatient with repeat ERCP in 6 to 8 weeks for stent removal/exchange  Results from last 7 days   Lab Units 06/18/25  0834 06/17/25  0516 06/16/25  0436 06/15/25  0441 06/14/25  1415   AST U/L 67* 60* 56* 56* 129*   ALT U/L 49 54* 50 62* 78*   TOTAL BILIRUBIN mg/dL 0.51 0.43 0.32 0.40 0.59     Recurrent major depressive disorder, in full remission (HCC)  Follows with psychiatry as an outpatient  Mood stable  Continue venlafaxine and buspirone  COPD (chronic obstructive pulmonary disease) (HCC)  COPD without exacerbation.  Continue Symbicort and albuterol as needed  Pulmonary nodule 1 cm or greater in diameter  CT pe study w abdomen pelvis w contrast: Mixed solid and groundglass nodule within the right upper lobe is increased in size since 6/7/2023. Findings are suspicious for adenocarcinoma spectrum lesion.   Ambulatory referral placed for outpatient pulmonology evaluation  Hyponatremia  resolved    Results from last 7 days   Lab Units 06/18/25  0834 06/17/25  0516 06/16/25  0436 " 06/15/25  0441   SODIUM mmol/L 136 137 137 136     Hyperglycemia  No history of diabetes mellitus.  Monitor with a.m. labs and recheck hemoglobin A1c    Results from last 7 days   Lab Units 06/18/25  0834 06/17/25  0516 06/16/25  0436 06/15/25  0441 06/14/25  1415   GLUCOSE RANDOM mg/dL 110 101 131 74 196*   HEMOGLOBIN A1C %  --   --   --  6.2*  --      Tobacco abuse  Nicotine patch ordered during hospitalization  Gastroesophageal reflux disease without esophagitis  Continue PPI  Peroneal neuropathy, right  Continue gabapentin  Elevated liver function tests  Trending down.  MRCP results reviewed  Treatment as above  Severe protein-calorie malnutrition (HCC)  Malnutrition Findings:   Adult Malnutrition type: Chronic illness  Adult Degree of Malnutrition: Other severe protein calorie malnutrition  Malnutrition Characteristics: Muscle loss, Weight loss     360 Statement: other severe protein calorie malnutrition in setting of chronic illness, evidenced by severe muscle loss/indented temples, protruding clavicles, wt loss 31% wt loss x 12 months, 06/05/24 63 kg (139 lb) to 6/14/25 Last Wt: 43.4 kg (95 lb 10.9 oz), treated with diet and supplements    BMI Findings:  Adult BMI Classifications: Underweight < 18.5     Body mass index is 15.44 kg/m².        Discharging Physician / Practitioner: Inder Gomes MD  PCP: Bruce Leon DO  Admission Date:   Admission Orders (From admission, onward)       Ordered        06/14/25 1828  INPATIENT ADMISSION  Once                          Discharge Date: 06/18/25    Medical Problems       Resolved Problems  Date Reviewed: 6/15/2025   None         Consultations During Hospital Stay:  Gastroenterology    Procedures Performed:   ERCP    Significant Findings / Test Results:   ERCP  Result Date: 6/17/2025  Impression: I personally interpreted cholangiogram images. Complete major papilla sphincterotomy was performed. An initial contrast injection was performed successfully.  Localized, intrinsic, smooth and moderate stricture was visualized in the mid common bile duct Performed brushing with cytology brush in the mid common bile duct One 10 Fr x 3 cm double flanged double pigtail stent was placed in the common bile duct Cannulation of the pancreatic duct was not attempted. Indomethacin 100mg ND was administered for prophylaxis of post-ERCP pancreatitis. RECOMMENDATION: Await cytology results Schedule repeat ERCP, due: 7/29/2025  - in approximately 6 weeks for stent exchange vs removal  Sanjuanita Bond MD     MRI abdomen wo contrast and mrcp  Result Date: 6/15/2025  Impression: Motion limited study. Also limited without IV contrast. Intrahepatic and extrahepatic biliary dilation with abrupt caliber change of the mid CBD at the pancreatic head. Numerous subcentimeter cystic foci within the pancreatic head with diffuse pancreatic head enlargement. Patient may benefit from EUS evaluation. The study was marked in EPIC for immediate notification. Workstation performed: [id number]     XR chest portable  Result Date: 6/15/2025  Impression: No acute cardiopulmonary disease. Right upper lobe nodule not visible. See subsequent chest CT. Workstation performed: [id number]     US right upper quadrant  Result Date: 6/14/2025  Impression: Dilated CBD. No evidence for cholelithiasis. Correlate with prior CT for additional findings. Workstation performed: [id number]     CT pe study w abdomen pelvis w contrast  Result Date: 6/14/2025  Impression: No evidence for pulmonary embolism. Mixed solid and groundglass nodule within the right upper lobe is increased in size since 6/7/2023. Findings are suspicious for adenocarcinoma spectrum lesion. Circumferential wall thickening of the duodenum suggesting the presence of duodenitis. Underlying neoplastic etiologies are not entirely excluded. Dilatation of the CBD and pancreatic duct is new since the prior examination. No discrete mass lesion identified on the  "current examination. MRCP is recommended for further evaluation. Patient is status post surgery for reported pancreatic pseudocysts and if any prior imaging is made available for review a direct comparison addendum could be performed. Remainder of the findings, as described above. The study was marked in EPIC for immediate notification. Computerized Assisted Algorithm (CAA) may have aided analysis of applicable images. Workstation performed: [id number]         Incidental Findings:   none     Test Results Pending at Discharge (will require follow up):   none     Outpatient Tests Requested:  none    Complications:  none    Reason for Admission: Elevated LFTS, Abdominal Pain    Hospital Course:     Nuris Infante is a 54 y.o. female patient who originally presented to the hospital on 6/14/2025 due to right-sided abdominal pain.  Found to have elevated LFTs.  Right upper quadrant ultrasound showing dilated common biliary duct.  MRI imaging showing intra and extrahepatic biliary dilation with abrupt change at the mid common biliary duct at the pancreatic head.  GI was consulted, underwent ERCP with sphincterectomy, biliary stent.  Cytology was also obtained and pending on discharge.  Patient's pain had somewhat improved that she tolerated without nausea or vomiting.  Discharged home to follow outpatient with GI for repeat ERCP and stent exchange/removal in 6 weeks.    Please see above list of diagnoses and related plan for additional information.     Condition at Discharge: fair     Discharge Day Visit / Exam:     Subjective:    Patient reported doing well today.  Pain was fairly controlled yesterday evening that she was able to obtain rest.  Has been tolerant diet without nausea or vomiting.  Vitals: Blood Pressure: (!) 158/110 (06/18/25 0739)  Pulse: (!) 117 (06/18/25 0739)  Temperature: 98.5 °F (36.9 °C) (06/18/25 0739)  Temp Source: Oral (06/18/25 0739)  Respirations: 19 (06/18/25 0739)  Height: 5' 6\" (167.6 cm) " (06/14/25 2007)  Weight - Scale: 43.4 kg (95 lb 10.9 oz) (06/14/25 2007)  SpO2: 91 % (06/18/25 0739)  Exam:   Physical Exam  Vitals and nursing note reviewed.   Constitutional:       Appearance: She is not ill-appearing.      Comments: thin   HENT:      Head: Normocephalic.     Eyes:      Conjunctiva/sclera: Conjunctivae normal.       Cardiovascular:      Rate and Rhythm: Normal rate.   Pulmonary:      Effort: Pulmonary effort is normal.      Breath sounds: No wheezing.   Abdominal:      General: Bowel sounds are normal. There is no distension.      Palpations: Abdomen is soft.     Musculoskeletal:         General: No swelling.      Right lower leg: No edema.      Left lower leg: No edema.     Skin:     General: Skin is warm and dry.     Neurological:      General: No focal deficit present.      Mental Status: She is alert. Mental status is at baseline.         Discharge instructions/Information to patient and family:   See after visit summary for information provided to patient and family.      Provisions for Follow-Up Care:  See after visit summary for information related to follow-up care and any pertinent home health orders.      Disposition:     Home     Discharge Statement:  I spent 40 minutes discharging the patient. This time was spent on the day of discharge. I had direct contact with the patient on the day of discharge. Greater than 50% of the total time was spent examining patient, answering all patient questions, arranging and discussing plan of care with patient as well as directly providing post-discharge instructions.  Additional time then spent on discharge activities.    Discharge Medications:  See after visit summary for reconciled discharge medications provided to patient and family.      ** Please Note: This note has been constructed using a voice recognition system **

## 2025-06-18 NOTE — ASSESSMENT & PLAN NOTE
"History of COPD mood disorder tobacco use and neuropathy who presents to the hospital for worsening right-sided abdominal/flank pains  In the ED underwent ultrasound and CT.  No evidence of cholecystitis but there are concerning findings for dilatation of CBD/pancreatic ducts  MRI showed \"Intrahepatic and extrahepatic biliary dilation with abrupt caliber change of the mid CBD at the pancreatic head. Numerous subcentimeter cystic foci within the pancreatic head with diffuse pancreatic head enlargement.\"  S/p ERCP 06/17 with sphincterectomy, biliary stent and cytology pending on discharge  Diet was advanced and patient had been tolerant  Short course of opioids was given on discharge.  Follow-up with GI outpatient with repeat ERCP in 6 to 8 weeks for stent removal/exchange  Results from last 7 days   Lab Units 06/18/25  0834 06/17/25  0516 06/16/25  0436 06/15/25  0441 06/14/25  1415   AST U/L 67* 60* 56* 56* 129*   ALT U/L 49 54* 50 62* 78*   TOTAL BILIRUBIN mg/dL 0.51 0.43 0.32 0.40 0.59     "

## 2025-06-18 NOTE — PROGRESS NOTES
Progress Note - Gastroenterology   Name: Nuris Infante 54 y.o. female I MRN: [id number]  Unit/Bed#: E5 -01 I Date of Admission: 6/14/2025   Date of Service: 6/18/2025 I Hospital Day: 4    Assessment & Plan  Elevated liver function tests  54-year-old female with history of COPD, depression, GERD, tobacco use, and prior pancreatic pseudocyst requiring surgical intervention and midline laparotomy incision after failed cystgastrostomy (several years ago) who presented on 6/14 with several weeks of epigastric and right upper quadrant abdominal pain associated with occasional nausea and vomiting found to have elevated liver chemistries including AST//78, alkaline phosphatase 377, and total bilirubin 0.59.  INR 0.87.  Lipase 168.  WBC 10.7 with lactic acidosis of 3.4 which corrected with IV fluid administration.  Pattern of liver chemistry mixed picture.  However, patient did have right upper quadrant ultrasound demonstrating a dilated gallbladder and CBD measuring 10 mm.  No intrahepatic dilation seen or evidence of choledocholithiasis with MRCP with motion artifact, but showing similar findings.  Would also consider DILI in differential and will plan to complete serologic workup given mixed pattern of elevation and history of elevated liver enzymes in the past.  She underwent ERCP on 6/17 notable for mid CBD biliary stricture status post placement of 10 x 3 double flange plastic CBD stent.  Continue to have abdominal pain today likely in setting of constipation.    Plan:  Follow-up serologic workup  Progress diet as tolerated  Start bowel regimen with MiraLAX, senna  Continue to monitor and trend LFTs to ensure improvement  Plan for repeat ERCP in 6 weeks for stent exchange versus removal  Avoid use of hepatotoxic agents  General Surgery consulted and appreciate recommendations  Additional pain and symptom management per primary team  Dilated bile duct  CBD dilated to 10 mm on right upper quadrant  ultrasound.  ERCP as above.  Gastroesophageal reflux disease without esophagitis  History of GERD.  Symptoms currently controlled at this time.  Okay to continue on Protonix 40 mg daily while inpatient.    Severe protein-calorie malnutrition (HCC)  Malnutrition Findings:   Adult Malnutrition type: Chronic illness  Adult Degree of Malnutrition: Other severe protein calorie malnutrition  Malnutrition Characteristics: Muscle loss, Weight loss                  360 Statement: other severe protein calorie malnutrition in setting of chronic illness, evidenced by severe muscle loss/indented temples, protruding clavicles, wt loss 31% wt loss x 12 months, 06/05/24 63 kg (139 lb) to 6/14/25 Last Wt: 43.4 kg (95 lb 10.9 oz), treated with diet and supplements    BMI Findings:  Adult BMI Classifications: Underweight < 18.5        Body mass index is 15.44 kg/m².       I have discussed the above management plan in detail with the primary service.     Subjective   Pain less intense today, but now a band in her upper abdomen.  No recent bowel movements.    Objective :  Temp:  [97.9 °F (36.6 °C)-98.9 °F (37.2 °C)] 98.5 °F (36.9 °C)  HR:  [102-126] 117  BP: (129-191)/() 158/110  Resp:  [16-20] 19  SpO2:  [91 %-100 %] 91 %  O2 Device: None (Room air)    Physical Exam  Vitals and nursing note reviewed.   Constitutional:       General: She is not in acute distress.     Appearance: She is well-developed and normal weight.   Abdominal:      General: Abdomen is flat. There is no distension.      Palpations: Abdomen is soft.      Tenderness: There is no abdominal tenderness.     Musculoskeletal:         General: No swelling.     Neurological:      Mental Status: She is alert.         Lab Results: I have reviewed the following results:none    Imaging Results Review: No pertinent imaging studies reviewed.  Other Study Results Review: No additional pertinent studies reviewed.    Administrative Statements   I have spent a total time of 32  minutes in caring for this patient on the day of the visit/encounter including Diagnostic results, Prognosis, Risks and benefits of tx options, Instructions for management, and Patient and family education.

## 2025-06-18 NOTE — PLAN OF CARE
Problem: PAIN - ADULT  Goal: Verbalizes/displays adequate comfort level or baseline comfort level  Description: Interventions:  - Encourage patient to monitor pain and request assistance  - Assess pain using appropriate pain scale  - Administer analgesics as ordered based on type and severity of pain and evaluate response  - Implement non-pharmacological measures as appropriate and evaluate response  - Consider cultural and social influences on pain and pain management  - Notify physician/advanced practitioner if interventions unsuccessful or patient reports new pain  - Educate patient/family on pain management process including their role and importance of  reporting pain   - Provide non-pharmacologic/complimentary pain relief interventions  Outcome: Progressing     Problem: INFECTION - ADULT  Goal: Absence or prevention of progression during hospitalization  Description: INTERVENTIONS:  - Assess and monitor for signs and symptoms of infection  - Monitor lab/diagnostic results  - Monitor all insertion sites, i.e. indwelling lines, tubes, and drains  - Monitor endotracheal if appropriate and nasal secretions for changes in amount and color  - [place] appropriate cooling/warming therapies per order  - Administer medications as ordered  - Instruct and encourage patient and family to use good hand hygiene technique  - Identify and instruct in appropriate isolation precautions for identified infection/condition  Outcome: Progressing     Problem: SAFETY ADULT  Goal: Patient will remain free of falls  Description: INTERVENTIONS:  - Educate patient/family on patient safety including physical limitations  - Instruct patient to call for assistance with activity   - Consider consulting OT/PT to assist with strengthening/mobility based on AM PAC & JH-HLM score  - Consult OT/PT to assist with strengthening/mobility   - Keep Call bell within reach  - Keep bed low and locked with side rails adjusted as appropriate  - Keep  care items and personal belongings within reach  - Initiate and maintain comfort rounds  - Make Fall Risk Sign visible to staff  - Apply yellow socks and bracelet for high fall risk patients  - Consider moving patient to room near nurses station  Outcome: Progressing     Problem: DISCHARGE PLANNING  Goal: Discharge to home or other facility with appropriate resources  Description: INTERVENTIONS:  - Identify barriers to discharge w/patient and caregiver  - Arrange for needed discharge resources and transportation as appropriate  - Identify discharge learning needs (meds, wound care, etc.)  - Arrange for interpretive services to assist at discharge as needed  - Refer to Case Management Department for coordinating discharge planning if the patient needs post-hospital services based on physician/advanced practitioner order or complex needs related to functional status, cognitive ability, or social support system  Outcome: Progressing     Problem: Knowledge Deficit  Goal: Patient/family/caregiver demonstrates understanding of disease process, treatment plan, medications, and discharge instructions  Description: Complete learning assessment and assess knowledge base.  Interventions:  - Provide teaching at level of understanding  - Provide teaching via preferred learning methods  Outcome: Progressing     Problem: Nutrition/Hydration-ADULT  Goal: Nutrient/Hydration intake appropriate for improving, restoring or maintaining nutritional needs  Description: Monitor and assess patient's nutrition/hydration status for malnutrition. Collaborate with interdisciplinary team and initiate plan and interventions as ordered.  Monitor patient's weight and dietary intake as ordered or per policy. Utilize nutrition screening tool and intervene as necessary. Determine patient's food preferences and provide high-protein, high-caloric foods as appropriate.     INTERVENTIONS:  - Monitor oral intake, urinary output, labs, and treatment  plans  - Assess nutrition and hydration status and recommend course of action  - Evaluate amount of meals eaten  - Assist patient with eating if necessary   - Allow adequate time for meals  - Recommend/ encourage appropriate diets, oral nutritional supplements, and vitamin/mineral supplements  - Order, calculate, and assess calorie counts as needed  - Recommend, monitor, and adjust tube feedings and TPN/PPN based on assessed needs  - Assess need for intravenous fluids  - Provide specific nutrition/hydration education as appropriate  - Include patient/family/caregiver in decisions related to nutrition  Outcome: Progressing

## 2025-06-18 NOTE — PLAN OF CARE
Problem: PAIN - ADULT  Goal: Verbalizes/displays adequate comfort level or baseline comfort level  Description: Interventions:  - Encourage patient to monitor pain and request assistance  - Assess pain using appropriate pain scale  - Administer analgesics as ordered based on type and severity of pain and evaluate response  - Implement non-pharmacological measures as appropriate and evaluate response  - Consider cultural and social influences on pain and pain management  - Notify physician/advanced practitioner if interventions unsuccessful or patient reports new pain  - Educate patient/family on pain management process including their role and importance of  reporting pain   - Provide non-pharmacologic/complimentary pain relief interventions  Outcome: Progressing     Problem: INFECTION - ADULT  Goal: Absence or prevention of progression during hospitalization  Description: INTERVENTIONS:  - Assess and monitor for signs and symptoms of infection  - Monitor lab/diagnostic results  - Monitor all insertion sites, i.e. indwelling lines, tubes, and drains  - Monitor endotracheal if appropriate and nasal secretions for changes in amount and color  - [place] appropriate cooling/warming therapies per order  - Administer medications as ordered  - Instruct and encourage patient and family to use good hand hygiene technique  - Identify and instruct in appropriate isolation precautions for identified infection/condition  Outcome: Progressing     Problem: SAFETY ADULT  Goal: Patient will remain free of falls  Description: INTERVENTIONS:  - Educate patient/family on patient safety including physical limitations  - Instruct patient to call for assistance with activity   - Consider consulting OT/PT to assist with strengthening/mobility based on AM PAC & JH-HLM score  - Consult OT/PT to assist with strengthening/mobility   - Keep Call bell within reach  - Keep bed low and locked with side rails adjusted as appropriate  - Keep  care items and personal belongings within reach  - Initiate and maintain comfort rounds  - Make Fall Risk Sign visible to staff  - Offer Toileting every  Hours, in advance of need  - Initiate/Maintain alarm  - Obtain necessary fall risk management equipment:   - Apply yellow socks and bracelet for high fall risk patients  - Consider moving patient to room near nurses station  Outcome: Progressing     Problem: DISCHARGE PLANNING  Goal: Discharge to home or other facility with appropriate resources  Description: INTERVENTIONS:  - Identify barriers to discharge w/patient and caregiver  - Arrange for needed discharge resources and transportation as appropriate  - Identify discharge learning needs (meds, wound care, etc.)  - Arrange for interpretive services to assist at discharge as needed  - Refer to Case Management Department for coordinating discharge planning if the patient needs post-hospital services based on physician/advanced practitioner order or complex needs related to functional status, cognitive ability, or social support system  Outcome: Progressing     Problem: Knowledge Deficit  Goal: Patient/family/caregiver demonstrates understanding of disease process, treatment plan, medications, and discharge instructions  Description: Complete learning assessment and assess knowledge base.  Interventions:  - Provide teaching at level of understanding  - Provide teaching via preferred learning methods  Outcome: Progressing     Problem: Nutrition/Hydration-ADULT  Goal: Nutrient/Hydration intake appropriate for improving, restoring or maintaining nutritional needs  Description: Monitor and assess patient's nutrition/hydration status for malnutrition. Collaborate with interdisciplinary team and initiate plan and interventions as ordered.  Monitor patient's weight and dietary intake as ordered or per policy. Utilize nutrition screening tool and intervene as necessary. Determine patient's food preferences and provide  high-protein, high-caloric foods as appropriate.     INTERVENTIONS:  - Monitor oral intake, urinary output, labs, and treatment plans  - Assess nutrition and hydration status and recommend course of action  - Evaluate amount of meals eaten  - Assist patient with eating if necessary   - Allow adequate time for meals  - Recommend/ encourage appropriate diets, oral nutritional supplements, and vitamin/mineral supplements  - Order, calculate, and assess calorie counts as needed  - Recommend, monitor, and adjust tube feedings and TPN/PPN based on assessed needs  - Assess need for intravenous fluids  - Provide specific nutrition/hydration education as appropriate  - Include patient/family/caregiver in decisions related to nutrition  Outcome: Progressing

## 2025-06-19 ENCOUNTER — TELEPHONE (OUTPATIENT)
Age: 55
End: 2025-06-19

## 2025-06-19 NOTE — TELEPHONE ENCOUNTER
We have received an ASAP referral for this patient, there are no openings within a reasonable timeframe at [place] for a follow up visit. Please review and advise    Date Referral Received: 6/14/25 (patient was just discharged from hospital on 6/18/25)    Priority of Referral: ASAP    Referred by: Atul Adan DO    Diagnosis: Pulmonary nodule 1 cm or greater in diameter    Provider Comments: Mixed solid and groundglass nodule within the right upper lobe is increased in size since 6/7/2023. Findings are suspicious for adenocarcinoma spectrum lesion.    [place] [place] (last seen by Leticia San on 6/5/2024)

## 2025-06-19 NOTE — UTILIZATION REVIEW
NOTIFICATION OF ADMISSION DISCHARGE   This is a Notification of Discharge from [facility]. Please be advised that this patient has been discharge from our facility. Below you will find the admission and discharge date and time including the patient’s disposition.   UTILIZATION REVIEW CONTACT:  Utilization Review Assistants  Network Utilization Review Department  Phone: [phone] x carefully listen to the prompts. All voicemails are confidential.  Email: [email or web address]     ADMISSION INFORMATION  PRESENTATION DATE: 6/14/2025  1:37 PM  OBERVATION ADMISSION DATE: N/A  INPATIENT ADMISSION DATE: 6/14/25  6:28 PM   DISCHARGE DATE: 6/18/2025 12:14 PM   DISPOSITION:Home/Self Care    Network Utilization Review Department  ATTENTION: Please call with any questions or concerns to [phone] and carefully listen to the prompts so that you are directed to the right person. All voicemails are confidential.   For Discharge needs, contact Care Management DC Support Team at [phone] opt. 2  Send all requests for admission clinical reviews, approved or denied determinations and any other requests to dedicated fax number below belonging to the campus where the patient is receiving treatment. List of dedicated fax numbers for the Facilities:  FACILITY NAME UR FAX NUMBER   ADMISSION DENIALS (Administrative/Medical Necessity) [phone]   DISCHARGE SUPPORT TEAM ([facility]) [phone]   PARENT CHILD HEALTH (Maternity/NICU/Pediatrics) [phone]   [facility] [phone]   [facility] [phone]   [facility] [phone]   [facility] [phone]   [facility] [phone]   [facility] [phone]   [facility] [phone]   [facility] [phone]   [facility]  [facility] [phone]   [facility] [phone]   [facility] [phone]   [facility] [phone]

## 2025-06-20 LAB
BACTERIA BLD CULT: NORMAL
BACTERIA BLD CULT: NORMAL

## 2025-06-20 PROCEDURE — 88342 IMHCHEM/IMCYTCHM 1ST ANTB: CPT | Performed by: PATHOLOGY

## 2025-06-20 PROCEDURE — 88112 CYTOPATH CELL ENHANCE TECH: CPT | Performed by: PATHOLOGY

## 2025-06-20 PROCEDURE — 88341 IMHCHEM/IMCYTCHM EA ADD ANTB: CPT | Performed by: PATHOLOGY

## 2025-06-20 PROCEDURE — 88305 TISSUE EXAM BY PATHOLOGIST: CPT | Performed by: PATHOLOGY

## 2025-06-23 DIAGNOSIS — G57.31 PERONEAL NEUROPATHY, RIGHT: ICD-10-CM

## 2025-06-23 RX ORDER — GABAPENTIN 400 MG/1
CAPSULE ORAL
Qty: 120 CAPSULE | Refills: 0 | OUTPATIENT
Start: 2025-06-23

## 2025-06-25 ENCOUNTER — TELEPHONE (OUTPATIENT)
Dept: GASTROENTEROLOGY | Facility: CLINIC | Age: 55
End: 2025-06-25

## 2025-06-25 NOTE — TELEPHONE ENCOUNTER
----- Message from Patricia NICHOLS sent at 6/24/2025 12:57 PM EDT -----    ----- Message -----  From: Charisma Davenport MA  Sent: 6/24/2025  11:46 AM EDT  To: [facility]    When reviewing pathology from 6/17/25 ERCP, Schedule repeat ERCP, due: 7/29/2025   - in approximately 6 weeks for stent exchange vs removal   Please review, thank you

## 2025-06-26 ENCOUNTER — RESULTS FOLLOW-UP (OUTPATIENT)
Dept: GASTROENTEROLOGY | Facility: CLINIC | Age: 55
End: 2025-06-26

## 2025-06-26 NOTE — TELEPHONE ENCOUNTER
I left a voice message to give our office a call back to review results and schedule next ERCP, thank you

## 2025-06-26 NOTE — TELEPHONE ENCOUNTER
----- Message from Sanjuanita Bond MD sent at 6/26/2025 10:54 AM EDT -----  Hi team - pls make sure pt saw Patricia's MyChart message    thank you  ----- Message -----  From: Lab, Background User  Sent: 6/20/2025   8:29 AM EDT  To: Sanjuanita Bond MD

## 2025-06-30 PROBLEM — R41.0 DELIRIUM: Status: ACTIVE | Noted: 2025-06-29
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
## 2025-06-30 PROBLEM — R78.81 GRAM-NEGATIVE BACTEREMIA: Status: ACTIVE | Noted: 2025-06-28

## 2025-06-30 PROBLEM — D69.6 THROMBOCYTOPENIA (HCC): Status: ACTIVE | Noted: 2025-06-27

## 2025-07-07 ENCOUNTER — TELEPHONE (OUTPATIENT)
Age: 55
End: 2025-07-07

## 2025-07-07 DIAGNOSIS — G57.31 PERONEAL NEUROPATHY, RIGHT: ICD-10-CM

## 2025-07-07 DIAGNOSIS — F33.2 SEVERE EPISODE OF RECURRENT MAJOR DEPRESSIVE DISORDER, WITHOUT PSYCHOTIC FEATURES (HCC): ICD-10-CM

## 2025-07-07 DIAGNOSIS — F41.1 GENERALIZED ANXIETY DISORDER: ICD-10-CM

## 2025-07-07 RX ORDER — GABAPENTIN 400 MG/1
800 CAPSULE ORAL 2 TIMES DAILY
Start: 2025-07-07

## 2025-07-07 RX ORDER — OLANZAPINE 2.5 MG/1
2.5 TABLET, FILM COATED ORAL
COMMUNITY
Start: 2025-07-05 | End: 2025-08-04

## 2025-07-07 RX ORDER — VENLAFAXINE HYDROCHLORIDE 150 MG/1
150 CAPSULE, EXTENDED RELEASE ORAL DAILY
Start: 2025-07-07 | End: 2025-10-05

## 2025-07-07 RX ORDER — BUSPIRONE HYDROCHLORIDE 10 MG/1
15 TABLET ORAL 2 TIMES DAILY
Qty: 405 TABLET | Refills: 0 | Status: SHIPPED | OUTPATIENT
Start: 2025-07-07 | End: 2026-01-03

## 2025-07-07 NOTE — TELEPHONE ENCOUNTER
Nuris Infante requested a call back to discuss medications. Pt called to schedule f/u appt. She was in the hospital for Sepsis on 6/27/25. She states the hospital told her to call her Psychiatrist to let him know what medications she is on from D/C. Writer scheduled her for 9/29/25 at 1 pm. Writer placed Pt on the cancellation list for an earlier appt date if available.  Thank you.

## 2025-07-08 ENCOUNTER — TRANSITIONAL CARE MANAGEMENT (OUTPATIENT)
Dept: FAMILY MEDICINE CLINIC | Facility: CLINIC | Age: 55
End: 2025-07-08

## 2025-07-08 RX ORDER — CIPROFLOXACIN 500 MG/1
TABLET, FILM COATED ORAL
COMMUNITY
Start: 2025-07-05

## 2025-07-08 RX ORDER — OXYCODONE AND ACETAMINOPHEN 5; 325 MG/1; MG/1
1 TABLET ORAL EVERY 6 HOURS PRN
COMMUNITY
Start: 2025-07-05 | End: 2025-07-12

## 2025-07-09 ENCOUNTER — OFFICE VISIT (OUTPATIENT)
Dept: FAMILY MEDICINE CLINIC | Facility: CLINIC | Age: 55
End: 2025-07-09
Payer: COMMERCIAL

## 2025-07-09 VITALS
HEART RATE: 120 BPM | HEIGHT: 66 IN | OXYGEN SATURATION: 97 % | BODY MASS INDEX: 16.68 KG/M2 | WEIGHT: 103.8 LBS | SYSTOLIC BLOOD PRESSURE: 100 MMHG | DIASTOLIC BLOOD PRESSURE: 70 MMHG

## 2025-07-09 DIAGNOSIS — M79.662 BILATERAL CALF PAIN: ICD-10-CM

## 2025-07-09 DIAGNOSIS — M79.661 BILATERAL CALF PAIN: ICD-10-CM

## 2025-07-09 DIAGNOSIS — E43 SEVERE PROTEIN-CALORIE MALNUTRITION (HCC): ICD-10-CM

## 2025-07-09 DIAGNOSIS — R73.9 HYPERGLYCEMIA: ICD-10-CM

## 2025-07-09 DIAGNOSIS — R91.1 PULMONARY NODULE 1 CM OR GREATER IN DIAMETER: Primary | ICD-10-CM

## 2025-07-09 DIAGNOSIS — R60.0 PEDAL EDEMA: ICD-10-CM

## 2025-07-09 DIAGNOSIS — F33.42 RECURRENT MAJOR DEPRESSIVE DISORDER, IN FULL REMISSION (HCC): ICD-10-CM

## 2025-07-09 DIAGNOSIS — D69.6 THROMBOCYTOPENIA (HCC): ICD-10-CM

## 2025-07-09 DIAGNOSIS — J18.9 PNEUMONIA OF BOTH LUNGS DUE TO INFECTIOUS ORGANISM, UNSPECIFIED PART OF LUNG: ICD-10-CM

## 2025-07-09 DIAGNOSIS — K83.8 DILATED BILE DUCT: ICD-10-CM

## 2025-07-09 DIAGNOSIS — E87.1 HYPONATREMIA: ICD-10-CM
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
## 2025-07-09 DIAGNOSIS — R41.0 DELIRIUM: ICD-10-CM

## 2025-07-09 PROCEDURE — 99496 TRANSJ CARE MGMT HIGH F2F 7D: CPT | Performed by: FAMILY MEDICINE

## 2025-07-09 RX ORDER — LORATADINE 10 MG
TABLET ORAL
COMMUNITY
Start: 2025-07-05

## 2025-07-09 RX ORDER — BLOOD-GLUCOSE METER
EACH MISCELLANEOUS
COMMUNITY
Start: 2025-07-05

## 2025-07-09 RX ORDER — BLOOD SUGAR DIAGNOSTIC
STRIP MISCELLANEOUS
COMMUNITY
Start: 2025-07-05

## 2025-07-09 RX ORDER — LANCETS
EACH MISCELLANEOUS
COMMUNITY
Start: 2025-07-05

## 2025-07-09 RX ORDER — FUROSEMIDE 20 MG/1
20 TABLET ORAL DAILY
Qty: 30 TABLET | Refills: 1 | Status: SHIPPED | OUTPATIENT
Start: 2025-07-09 | End: 2025-07-15

## 2025-07-09 NOTE — ASSESSMENT & PLAN NOTE
Status post hospitalization [facility]  Orders:  •  Basic metabolic panel; Future  •  CBC; Future  •  Insulin, fasting; Future  •  Albumin / creatinine urine ratio; Future  •  UA (URINE) with reflex to Scope; Future  •  Glucose Tolerance Test, 2 Specimens (75G); Future

## 2025-07-09 NOTE — ASSESSMENT & PLAN NOTE
Patient did do chest CT 6/27/2025 [facility] no nodule seen but there were groundglass densities patient had history of multifocal pneumonia will repeat CT 3 weeks  Orders:  •  Basic metabolic panel; Future  •  CBC; Future  •  Insulin, fasting; Future  •  Albumin / creatinine urine ratio; Future  •  UA (URINE) with reflex to Scope; Future  •  Glucose Tolerance Test, 2 Specimens (75G); Future  •  CT chest wo contrast; Future

## 2025-07-09 NOTE — ASSESSMENT & PLAN NOTE
Secondary to sepsis status post hospitalization  Orders:  •  Basic metabolic panel; Future  •  CBC; Future  •  Insulin, fasting; Future  •  Albumin / creatinine urine ratio; Future  •  UA (URINE) with reflex to Scope; Future  •  Glucose Tolerance Test, 2 Specimens (75G); Future

## 2025-07-09 NOTE — PATIENT INSTRUCTIONS
Stop diabetic medication metformin and linagliptin  Start furosemide 20 mg daily for leg swelling  Complete ultrasound lower extremities rule out blood clot  Repeat fasting blood work in 2 weeks  Repeat chest CT in 3 weeks for follow-up pneumonia  Follow with all specialist further instructions  Recheck 4 weeks sooner if needed   -118. Patient reported to have [id number] by day RN. Otherwise VSS as documented for 2100. BP 78/57 on R arm manually; HR 90; RR 16 97% on RA. Patient states she feels weak. Temp 101.2 rectally, /58,  sinus tachy; RR16 93% on RA

## 2025-07-09 NOTE — ASSESSMENT & PLAN NOTE
Depression Screening Follow-up Plan: Patient's depression screening was positive with a PHQ-9 score of 11. Continue regular follow-up with their psychologist/therapist/psychiatrist who is managing their mental health condition(s).      Orders:  •  Basic metabolic panel; Future  •  CBC; Future  •  Insulin, fasting; Future  •  Albumin / creatinine urine ratio; Future  •  UA (URINE) with reflex to Scope; Future  •  Glucose Tolerance Test, 2 Specimens (75G); Future

## 2025-07-09 NOTE — ASSESSMENT & PLAN NOTE
Follow-up with gastroenterology status post sphincterotomy with stent and then subsequent stent removal  Orders:  •  Basic metabolic panel; Future  •  CBC; Future  •  Insulin, fasting; Future  •  Albumin / creatinine urine ratio; Future  •  UA (URINE) with reflex to Scope; Future  •  Glucose Tolerance Test, 2 Specimens (75G); Future

## 2025-07-09 NOTE — ASSESSMENT & PLAN NOTE
Question if diabetic.  Will discontinue metformin and Tradjenta will repeat BMP, 2-hour glucose tolerance test in 2 weeks  Orders:  •  Basic metabolic panel; Future  •  CBC; Future  •  Insulin, fasting; Future  •  Albumin / creatinine urine ratio; Future  •  UA (URINE) with reflex to Scope; Future  •  Glucose Tolerance Test, 2 Specimens (75G); Future

## 2025-07-09 NOTE — ASSESSMENT & PLAN NOTE
Blood work is ordered  Orders:  •  Basic metabolic panel; Future  •  CBC; Future  •  Insulin, fasting; Future  •  Albumin / creatinine urine ratio; Future  •  UA (URINE) with reflex to Scope; Future  •  Glucose Tolerance Test, 2 Specimens (75G); Future

## 2025-07-09 NOTE — PROGRESS NOTES
"Name: Nuris Infante      : 1970      MRN: [id number]  Encounter Provider: Bruce Leon DO  Encounter Date: 2025   Encounter department: [facility] PRIMARY CARE  :  Assessment & Plan           History of Present Illness {?Quick Links Encounters * My Last Note * Last Note in Specialty * Snapshot * Since Last Visit * History :13553}  HPI  Review of Systems    Objective {?Quick Links Trend Vitals * Enter New Vitals * Results Review * Timeline (Adult) * Labs * Imaging * Cardiology * Procedures * Lung Cancer Screening * Surgical eConsent :15214}  /70 (BP [place] Right arm, Patient Position: Sitting, Cuff Size: Standard)   Pulse (!) 120   Ht 5' 6\" (1.676 m)   Wt 47.1 kg (103 lb 12.8 oz)   LMP 2019 (Approximate)   SpO2 97%   BMI 16.75 kg/m²      Physical Exam  {Administrative / Billing Section (Optional):06444}  "

## 2025-07-09 NOTE — ASSESSMENT & PLAN NOTE
Blood work ordered  Orders:  •  Basic metabolic panel; Future  •  CBC; Future  •  Insulin, fasting; Future  •  Albumin / creatinine urine ratio; Future  •  UA (URINE) with reflex to Scope; Future  •  Glucose Tolerance Test, 2 Specimens (75G); Future

## 2025-07-09 NOTE — ASSESSMENT & PLAN NOTE
Furosemide 20 mg daily ordered will check lower extremity venous duplex rule out DVT  Orders:  •   VAS VENOUS DUPLEX - LOWER LIMB BILATERAL; Future  •  furosemide (LASIX) 20 mg tablet; Take 1 tablet (20 mg total) by mouth daily  •  Basic metabolic panel; Future  •  CBC; Future  •  Insulin, fasting; Future  •  Albumin / creatinine urine ratio; Future  •  UA (URINE) with reflex to Scope; Future  •  Glucose Tolerance Test, 2 Specimens (75G); Future
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
## 2025-07-10 ENCOUNTER — TELEPHONE (OUTPATIENT)
Dept: FAMILY MEDICINE CLINIC | Facility: CLINIC | Age: 55
End: 2025-07-10

## 2025-07-10 NOTE — TELEPHONE ENCOUNTER
Patient called the RX Refill Line. Message is being forwarded to the office.     Patient is requesting a call back, pt wants to discuss medications. Pt was in the hospital a few weeks ago and was given meds and wants to know what should she take and not to take.    Please contact patient at [phone]    Pharmacy: [id number] - [place] - [address] [phone]

## 2025-07-11 ENCOUNTER — TELEPHONE (OUTPATIENT)
Dept: FAMILY MEDICINE CLINIC | Facility: CLINIC | Age: 55
End: 2025-07-11

## 2025-07-11 NOTE — TELEPHONE ENCOUNTER
Patient called in stating Dr. Shafer wanted her to call in with the name of a medication they had previously discussed. The name of the medication is Suvorexant.     Please contact the patient with any further questions or concerns.

## 2025-07-14 ENCOUNTER — TELEPHONE (OUTPATIENT)
Age: 55
End: 2025-07-14

## 2025-07-14 LAB — MISCELLANEOUS LAB TEST RESULT: NORMAL

## 2025-07-14 NOTE — TELEPHONE ENCOUNTER
Patient called in about taking the LASIX - Advised per notes on chart that she should take 2 of the LASIX.    Call ended well and patient had no further questions.

## 2025-07-14 NOTE — TELEPHONE ENCOUNTER
Patient returned call. Advised per notes on the chart regarding the Oxycodne -    This will need to wait for TS since nothing on his note about pain medication.    Patient understood and had no further questions.

## 2025-07-14 NOTE — TELEPHONE ENCOUNTER
Patient called, she was seen 7/9 by Primary Care Provider.  Swelling both legs, is painful and at times, is difficult to walk.  Symptoms are the same, have not worsened.  Patient confirmed is taking    furosemide (LASIX) 20 mg tablet   1 tablet daily    Medication not helping with the swelling.  What does Primary Care Provider  Recommend?   Should she increase the dose?  Please advise.  Patients call back# [phone]

## 2025-07-14 NOTE — TELEPHONE ENCOUNTER
Patient called, she was prescribed the following when she was in the hospital and was advised Primary Care Provider  Needs to manage refilled.  Last seen 7/9/2025    Not finding on active medication list at this time.  Note: separate encounter entered, leg pain/swelling    Medication: Oxycodone 2.5-325mg    Dose/Frequency: 1 tablet every 4 hours    Quantity: ?    Pharmacy: [facility]     Office:   [x] PCP/Provider -   [] Speciality/Provider -     Does the patient have enough for 3 days?   [] Yes   [x] No - Send as HP to POD

## 2025-07-15 ENCOUNTER — OFFICE VISIT (OUTPATIENT)
Dept: FAMILY MEDICINE CLINIC | Facility: CLINIC | Age: 55
End: 2025-07-15
Payer: COMMERCIAL

## 2025-07-15 VITALS
HEART RATE: 122 BPM | DIASTOLIC BLOOD PRESSURE: 68 MMHG | WEIGHT: 101.6 LBS | HEIGHT: 66 IN | TEMPERATURE: 98.7 F | SYSTOLIC BLOOD PRESSURE: 120 MMHG | RESPIRATION RATE: 16 BRPM | BODY MASS INDEX: 16.33 KG/M2 | OXYGEN SATURATION: 98 %

## 2025-07-15 DIAGNOSIS — R60.0 PEDAL EDEMA: Primary | ICD-10-CM

## 2025-07-15 PROCEDURE — 99214 OFFICE O/P EST MOD 30 MIN: CPT | Performed by: FAMILY MEDICINE

## 2025-07-15 RX ORDER — BUMETANIDE 2 MG/1
2 TABLET ORAL DAILY
Qty: 30 TABLET | Refills: 5 | Status: SHIPPED | OUTPATIENT
Start: 2025-07-15

## 2025-07-15 RX ORDER — VENLAFAXINE HYDROCHLORIDE 75 MG/1
75 CAPSULE, EXTENDED RELEASE ORAL DAILY
COMMUNITY
Start: 2025-07-13 | End: 2025-07-15

## 2025-07-16 ENCOUNTER — TELEPHONE (OUTPATIENT)
Age: 55
End: 2025-07-16

## 2025-07-16 ENCOUNTER — HOSPITAL ENCOUNTER (OUTPATIENT)
Dept: NON INVASIVE DIAGNOSTICS | Facility: HOSPITAL | Age: 55
Discharge: HOME | End: 2025-07-16
Attending: FAMILY MEDICINE
Payer: COMMERCIAL

## 2025-07-16 ENCOUNTER — TELEPHONE (OUTPATIENT)
Dept: FAMILY MEDICINE CLINIC | Facility: CLINIC | Age: 55
End: 2025-07-16

## 2025-07-16 DIAGNOSIS — K83.8 DILATED CBD, ACQUIRED: ICD-10-CM

## 2025-07-16 DIAGNOSIS — R26.2 AMBULATORY DYSFUNCTION: Primary | ICD-10-CM

## 2025-07-16 DIAGNOSIS — R60.0 PEDAL EDEMA: ICD-10-CM

## 2025-07-16 DIAGNOSIS — F51.01 PRIMARY INSOMNIA: Primary | ICD-10-CM

## 2025-07-16 PROCEDURE — 93970 EXTREMITY STUDY: CPT

## 2025-07-16 PROCEDURE — 93970 EXTREMITY STUDY: CPT | Performed by: SURGERY

## 2025-07-16 RX ORDER — OXYCODONE HYDROCHLORIDE 5 MG/1
5 TABLET ORAL EVERY 8 HOURS PRN
Qty: 12 TABLET | Refills: 0 | Status: SHIPPED | OUTPATIENT
Start: 2025-07-16 | End: 2025-07-25 | Stop reason: SDUPTHER

## 2025-07-16 NOTE — TELEPHONE ENCOUNTER
Left detailed message advising patient both pain and sleep medication were sent to [facility] pharmacy.

## 2025-07-16 NOTE — TELEPHONE ENCOUNTER
Patient called back about getting the medications refilled. Advised that it is being discussed with Dr Leon and she will receive a callback once they have an answer from him.

## 2025-07-16 NOTE — TELEPHONE ENCOUNTER
Patient calling to check the status of the oxycodene.    See messages below.  States it was discussed at appointment on 7/14.    Please advise [phone]

## 2025-07-16 NOTE — TELEPHONE ENCOUNTER
Patient called again requesting the medication to be sent to the pharmacy.  She want to restart sleeping medication.    She is also requesting oxycodene which is in a message 7/14.  She had this in the hospital and was seen by Dr. Buckley 7/15.    Patient is getting agitated.  Patient disconnected the when I talking with Kym.    Please advise patient [phone]

## 2025-07-16 NOTE — TELEPHONE ENCOUNTER
Pt was recently discharged from [facility] on 7/5. Pt states that upon discharge she was prescribed medications. Pt told to ask PCP for refills. Pt asking for a refill on     Tradjenta 5 mg - once a day    Suvorexant 10 mg - once a day    Pt states that she has no more medication.      Pt asking for refills to be sent to [id number] - [place] - [address]

## 2025-07-16 NOTE — TELEPHONE ENCOUNTER
Patient called back.  She stated that when in the office yesterday Dr. Buckley stated she was going to send oxycodone to the [facility] pharmacy due to the pain in her legs.  The Pharmacy does not have that yet.  Can we send today so she only need to go out once?  [facility]ospitals in [place] advise       Thank you

## 2025-07-16 NOTE — TELEPHONE ENCOUNTER
Patient called in requesting a referral for home care patient is requesting a call back at [phone]  Please advise

## 2025-07-16 NOTE — TELEPHONE ENCOUNTER
Patient called back - she doesn't want to stop the Suvorexant.  States that helps her with her legs when sleeping.    She is requesting a script be sent to [facility] [place].

## 2025-07-16 NOTE — TELEPHONE ENCOUNTER
Multiple encounters relating to previous message about sleep medication and oxycodone. Messages have been forwarded to pcp.

## 2025-07-16 NOTE — TELEPHONE ENCOUNTER
Left detailed message advising patient both pain and sleep medication were sent to [facility] pharmacy.

## 2025-07-17 ENCOUNTER — TELEPHONE (OUTPATIENT)
Age: 55
End: 2025-07-17

## 2025-07-17 NOTE — TELEPHONE ENCOUNTER
Please call patient to see what help she needs in the home.  I do not have any homebound diagnosis I may use.  Where she needs physical therapy stability we could have [facility] come into the home for physical therapy and Occupational Therapy

## 2025-07-17 NOTE — TELEPHONE ENCOUNTER
PA for (ROXICODONE) 5 immediate release tablet SUBMITTED to     via    []CMM-KEY:   [x]Surescripts-Case ID # [id number]   []Availity-Auth ID # NDC #   []Faxed to plan   []Other website   []Phone call Case ID #     [x]PA sent as URGENT    All office notes, labs and other pertaining documents and studies sent. Clinical questions answered. Awaiting determination from insurance company.     Turnaround time for your insurance to make a decision on your Prior Authorization can take 7-21 business days.

## 2025-07-18 RX ORDER — CIPROFLOXACIN 500 MG/1
TABLET, FILM COATED ORAL
OUTPATIENT
Start: 2025-07-18

## 2025-07-18 NOTE — TELEPHONE ENCOUNTER
Patient called the office back to let the provider know that she needs assistance moving around her home. Patient states she needs help going up and down her stairs and overall getting around. Please review and advise

## 2025-07-18 NOTE — TELEPHONE ENCOUNTER
PA for oxyCODONE 5MG IR tablet APPROVED     Date(s) approved 07/18/2025-01/18/2026    Case #[id number]     Patient advised by          [x]MyChart Message  []Phone call   []LMOM  []L/M to call office as no active Communication consent on file  [x]Unable to leave detailed message as VM not approved on Communication consent       Pharmacy advised by    [x]Fax  []Phone call  []Secure Chat    Approval letter scanned into Media Yes

## 2025-07-18 NOTE — TELEPHONE ENCOUNTER
FYI: Patient stated that she was informed by her pharmacy that she needs a prior authorization for the medication Belsomra, but she said if you receive a message about it to please disregard, since the psychiatry office is taking care of it. Thank you.

## 2025-07-18 NOTE — TELEPHONE ENCOUNTER
Patient wasn't sure this is a medication she should stay on or not. Please advise if refill recommended. (Cipro)

## 2025-07-21 ENCOUNTER — TELEPHONE (OUTPATIENT)
Age: 55
End: 2025-07-21

## 2025-07-21 DIAGNOSIS — K83.8 DILATED CBD, ACQUIRED: ICD-10-CM

## 2025-07-21 RX ORDER — OXYCODONE HYDROCHLORIDE 5 MG/1
5 TABLET ORAL EVERY 8 HOURS PRN
Qty: 12 TABLET | Refills: 0 | OUTPATIENT
Start: 2025-07-21

## 2025-07-21 NOTE — TELEPHONE ENCOUNTER
Patient called back about Home health.  She wanted us to send that referral over today to a [facility].

## 2025-07-21 NOTE — TELEPHONE ENCOUNTER
oxyCODONE (ROXICODONE) 5 immediate release tablet Take 1 tablet (5 mg total) by mouth every 8 (eight) hours as needed for moderate pain or severe pain for up to 12 doses Max Daily Amount: 15 mg     Pls have doctor Marcio call in the Oxycodone to [facility].  Approval came through

## 2025-07-21 NOTE — TELEPHONE ENCOUNTER
Patients GI provider:  Dr. Sanjuanita Bond    Number to return call: [phone]    Reason for call: Pt calling stating she received a letter for her to call us to schedule repeat ERCP w/ Dr. Bond at the [facility]. Please reach out to pt on this. Thank you.    Scheduled procedure/appointment date if applicable: N/A

## 2025-07-22 DIAGNOSIS — K83.8 DILATED CBD, ACQUIRED: ICD-10-CM

## 2025-07-22 NOTE — TELEPHONE ENCOUNTER
Patient called to review her Oxycodone refill request. I provided patient with pcp message above. Patient stated today is her last tablet of medication and she does not have an appointment until 08/19/2025. Patient had asked if there was sooner availability within the schedule. Upon schedule review I unfortunately did not see availability within pcp schedule. I spoke with office clerical who confirmed the same. Patient had been advised. Patient expressed understanding and had asked since the refill of oxycodone cannot be given is there another medication that could potentially be prescribed to assist in pain management until she is able to come in for her visit with pcp? Patient stated she had been on a medication previously that had helped. Please have pcp review. Patient was also placed on the wait list in case availability sooner had opened.

## 2025-07-22 NOTE — TELEPHONE ENCOUNTER
Pt called refill line and stated she will need the medication to be called in to cover her till her appointment on 08-. Please call the pt back regarding this. Pt stated she will come in sooner if there is a sooner appointment available

## 2025-07-22 NOTE — TELEPHONE ENCOUNTER
Left message for patient to call back to schedule procedure. Gave my direct line to call back on.

## 2025-07-23 ENCOUNTER — APPOINTMENT (OUTPATIENT)
Dept: LAB | Facility: CLINIC | Age: 55
End: 2025-07-23
Payer: COMMERCIAL
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
## 2025-07-23 DIAGNOSIS — R41.0 DELIRIUM: ICD-10-CM

## 2025-07-23 DIAGNOSIS — R91.1 PULMONARY NODULE 1 CM OR GREATER IN DIAMETER: ICD-10-CM

## 2025-07-23 DIAGNOSIS — R60.0 PEDAL EDEMA: ICD-10-CM

## 2025-07-23 DIAGNOSIS — E43 SEVERE PROTEIN-CALORIE MALNUTRITION (HCC): ICD-10-CM

## 2025-07-23 DIAGNOSIS — K83.8 DILATED BILE DUCT: ICD-10-CM

## 2025-07-23 DIAGNOSIS — E87.1 HYPONATREMIA: ICD-10-CM

## 2025-07-23 DIAGNOSIS — R73.9 HYPERGLYCEMIA: ICD-10-CM

## 2025-07-23 DIAGNOSIS — M79.661 BILATERAL CALF PAIN: ICD-10-CM

## 2025-07-23 DIAGNOSIS — G57.31 PERONEAL NEUROPATHY, RIGHT: ICD-10-CM

## 2025-07-23 DIAGNOSIS — D69.6 THROMBOCYTOPENIA (HCC): ICD-10-CM

## 2025-07-23 DIAGNOSIS — F33.42 RECURRENT MAJOR DEPRESSIVE DISORDER, IN FULL REMISSION (HCC): ICD-10-CM

## 2025-07-23 DIAGNOSIS — M79.662 BILATERAL CALF PAIN: ICD-10-CM

## 2025-07-23 PROBLEM — D72.829 LEUKOCYTOSIS: Status: ACTIVE | Noted: 2025-07-23

## 2025-07-23 PROBLEM — D64.9 ANEMIA: Status: ACTIVE | Noted: 2025-07-23

## 2025-07-23 PROBLEM — D75.839 THROMBOCYTOSIS: Status: ACTIVE | Noted: 2025-07-23

## 2025-07-23 LAB
ANION GAP SERPL CALCULATED.3IONS-SCNC: 8 MMOL/L (ref 4–13)
BNP SERPL-MCNC: 88 PG/ML (ref 0–100)
BUN SERPL-MCNC: 9 MG/DL (ref 5–25)
CALCIUM SERPL-MCNC: 8.8 MG/DL (ref 8.4–10.2)
CHLORIDE SERPL-SCNC: 101 MMOL/L (ref 96–108)
CO2 SERPL-SCNC: 29 MMOL/L (ref 21–32)
CREAT SERPL-MCNC: 0.62 MG/DL (ref 0.6–1.3)
ERYTHROCYTE [DISTWIDTH] IN BLOOD BY AUTOMATED COUNT: 15.7 % (ref 11.6–15.1)
GFR SERPL CREATININE-BSD FRML MDRD: 101 ML/MIN/1.73SQ M
GLUCOSE P FAST SERPL-MCNC: 62 MG/DL (ref 65–99)
HCT VFR BLD AUTO: 32.5 % (ref 34.8–46.1)
HGB BLD-MCNC: 9.9 G/DL (ref 11.5–15.4)
INSULIN SERPL-ACNC: 2.16 UIU/ML (ref 1.9–23)
MCH RBC QN AUTO: 29.9 PG (ref 26.8–34.3)
MCHC RBC AUTO-ENTMCNC: 30.5 G/DL (ref 31.4–37.4)
MCV RBC AUTO: 98 FL (ref 82–98)
PLATELET # BLD AUTO: 885 THOUSANDS/UL (ref 149–390)
PMV BLD AUTO: 9.1 FL (ref 8.9–12.7)
POTASSIUM SERPL-SCNC: 3.8 MMOL/L (ref 3.5–5.3)
RBC # BLD AUTO: 3.31 MILLION/UL (ref 3.81–5.12)
SODIUM SERPL-SCNC: 138 MMOL/L (ref 135–147)
TSH SERPL DL<=0.05 MIU/L-ACNC: 3.37 UIU/ML (ref 0.45–4.5)
WBC # BLD AUTO: 13.41 THOUSAND/UL (ref 4.31–10.16)

## 2025-07-23 PROCEDURE — 83525 ASSAY OF INSULIN: CPT

## 2025-07-23 PROCEDURE — 83880 ASSAY OF NATRIURETIC PEPTIDE: CPT

## 2025-07-23 PROCEDURE — 36415 COLL VENOUS BLD VENIPUNCTURE: CPT

## 2025-07-23 PROCEDURE — 84443 ASSAY THYROID STIM HORMONE: CPT | Performed by: FAMILY MEDICINE

## 2025-07-23 PROCEDURE — 80048 BASIC METABOLIC PNL TOTAL CA: CPT

## 2025-07-23 PROCEDURE — 85027 COMPLETE CBC AUTOMATED: CPT

## 2025-07-23 NOTE — TELEPHONE ENCOUNTER
Not a duplicate    Prescription dated 07/07/25 was not sent to pharmacy. Patient confirmed that this med is managed by her PCP.    Reason for call:   [x] Refill   [] Prior Auth  [] Other:     Office:   [x] PCP/Provider - Susie AIKEN / [facility]    Medication: gabapentin     Dose/Frequency: 400mg; 2 caps bid    Quantity: 120    Pharmacy: [id number] - [place] - [address] [facility]   Does the patient have enough for 3 days?   [x] Yes   [] No - Send as HP to POD

## 2025-07-24 DIAGNOSIS — K21.9 GASTROESOPHAGEAL REFLUX DISEASE WITHOUT ESOPHAGITIS: ICD-10-CM

## 2025-07-24 DIAGNOSIS — G57.31 PERONEAL NEUROPATHY, RIGHT: ICD-10-CM

## 2025-07-24 NOTE — TELEPHONE ENCOUNTER
Procedure:  ERCP  Scheduled date of procedure (as of today):8/26/25  Physician performing procedure: Dr. Bond   Location of procedure: [place]   Instructions reviewed with patient by:  Patricia - mailed   Clearances:  n/a

## 2025-07-25 ENCOUNTER — HOME CARE VISIT (OUTPATIENT)
Dept: HOME HEALTH SERVICES | Facility: HOME HEALTHCARE | Age: 55
End: 2025-07-25

## 2025-07-25 ENCOUNTER — OFFICE VISIT (OUTPATIENT)
Dept: FAMILY MEDICINE CLINIC | Facility: CLINIC | Age: 55
End: 2025-07-25
Payer: COMMERCIAL

## 2025-07-25 VITALS
DIASTOLIC BLOOD PRESSURE: 90 MMHG | HEIGHT: 66 IN | OXYGEN SATURATION: 98 % | WEIGHT: 99 LBS | HEART RATE: 118 BPM | SYSTOLIC BLOOD PRESSURE: 130 MMHG | BODY MASS INDEX: 15.91 KG/M2

## 2025-07-25 DIAGNOSIS — F33.2 SEVERE EPISODE OF RECURRENT MAJOR DEPRESSIVE DISORDER, WITHOUT PSYCHOTIC FEATURES (HCC): ICD-10-CM

## 2025-07-25 DIAGNOSIS — G57.31 PERONEAL NEUROPATHY, RIGHT: Primary | ICD-10-CM

## 2025-07-25 DIAGNOSIS — F41.1 GENERALIZED ANXIETY DISORDER: ICD-10-CM

## 2025-07-25 DIAGNOSIS — M79.661 BILATERAL CALF PAIN: ICD-10-CM

## 2025-07-25 DIAGNOSIS — M79.662 BILATERAL CALF PAIN: ICD-10-CM

## 2025-07-25 DIAGNOSIS — R26.9 NEUROLOGIC GAIT DYSFUNCTION: ICD-10-CM

## 2025-07-25 DIAGNOSIS — D64.9 ANEMIA, UNSPECIFIED TYPE: ICD-10-CM

## 2025-07-25 DIAGNOSIS — R60.0 PEDAL EDEMA: ICD-10-CM

## 2025-07-25 DIAGNOSIS — R26.2 AMBULATORY DYSFUNCTION: ICD-10-CM

## 2025-07-25 DIAGNOSIS — K21.9 GASTROESOPHAGEAL REFLUX DISEASE WITHOUT ESOPHAGITIS: ICD-10-CM

## 2025-07-25 DIAGNOSIS — K83.8 DILATED CBD, ACQUIRED: ICD-10-CM

## 2025-07-25 PROCEDURE — 99214 OFFICE O/P EST MOD 30 MIN: CPT | Performed by: FAMILY MEDICINE

## 2025-07-25 RX ORDER — GABAPENTIN 400 MG/1
800 CAPSULE ORAL 2 TIMES DAILY
Qty: 360 CAPSULE | Refills: 1 | Status: SHIPPED | OUTPATIENT
Start: 2025-07-25

## 2025-07-25 RX ORDER — OMEPRAZOLE 20 MG/1
20 CAPSULE, DELAYED RELEASE ORAL
Qty: 90 CAPSULE | Refills: 1 | Status: SHIPPED | OUTPATIENT
Start: 2025-07-25 | End: 2026-01-21

## 2025-07-25 RX ORDER — GABAPENTIN 400 MG/1
800 CAPSULE ORAL 2 TIMES DAILY
Qty: 120 CAPSULE | Refills: 0 | OUTPATIENT
Start: 2025-07-25

## 2025-07-25 RX ORDER — VENLAFAXINE HYDROCHLORIDE 150 MG/1
150 CAPSULE, EXTENDED RELEASE ORAL DAILY
Qty: 90 CAPSULE | Refills: 1
Start: 2025-07-25 | End: 2025-10-23

## 2025-07-25 RX ORDER — OXYCODONE HYDROCHLORIDE 5 MG/1
5 TABLET ORAL EVERY 8 HOURS PRN
Qty: 12 TABLET | Refills: 0 | Status: SHIPPED | OUTPATIENT
Start: 2025-07-25

## 2025-07-25 NOTE — ASSESSMENT & PLAN NOTE
Really  not getting  around well due to  pain, swelling  and  balance issues. Rec  in-home  PT  Orders:    Referral to Home Health- [facility]; Future

## 2025-07-25 NOTE — ASSESSMENT & PLAN NOTE
Really  not getting  around well due to  pain, swelling  and  balance issues. Rec  in-home  PT  Orders:    Referral to [facility]; Future

## 2025-07-25 NOTE — PROGRESS NOTES
Name: Nuris Infante      : 1970      MRN: [id number]  Encounter Provider: Nilda Buckley MD  Encounter Date: 2025   Encounter department: [facility]  :  Assessment & Plan  Peroneal neuropathy, right  Really  not getting  around well due to  pain, swelling  and  balance issues. Rec  in-home  PT  Orders:    [facility]    gabapentin (NEURONTIN) 400 mg capsule; Take 2 capsules (800 mg total) by mouth 2 (two) times a day TAKE 2 CAPSULES BY MOUTH ONCE DAILY IN THE MORNING AND 2 IN THE EVENING    Ambulatory dysfunction  Really  not getting  around well due to  pain, swelling  and  balance issues. Rec  in-home  PT  Orders:    Referral to [facility]; Future    Pedal edema  Really  not getting  around well due to  pain, swelling  and  balance issues. Rec  in-home  PT  Orders:    [facility]    Bilateral calf pain  Really  not getting  around well due to  pain, swelling  and  balance issues. Rec  in-home  PT  Orders:    [facility]; Future    Neurologic gait dysfunction  Really  not getting  around well due to  pain, swelling  and  balance issues. Rec  in-home  PT  Orders:    [facility] Future    Dilated cbd, acquired    Orders:    oxyCODONE (ROXICODONE) 5 immediate release tablet; Take 1 tablet (5 mg total) by mouth every 8 (eight) hours as needed for moderate pain or severe pain for up to 12 doses Max Daily Amount: 15 mg    Gastroesophageal reflux disease without esophagitis    Orders:    omeprazole (PriLOSEC) 20 mg delayed release capsule; Take 1 capsule (20 mg total) by mouth daily before breakfast    Generalized anxiety disorder    Orders:    venlafaxine (EFFEXOR-XR) 150 mg 24 hr capsule; Take 1 capsule (150 mg total) by mouth daily    Severe episode of recurrent major depressive disorder, without psychotic features (HCC)      Orders:     "venlafaxine (EFFEXOR-XR) 150 mg 24 hr capsule; Take 1 capsule (150 mg total) by mouth daily    Anemia, unspecified type    Nneeds  repeat cbc , consult  to hematology  for  elevated platelets  they rec  other  testing                History of Present Illness   Patient presents with:  Follow-up: Review medication   Got pain med  previously  when  at hospital in [facility]  for  surgery 3  years ago,  just  restarted  when in hospital last, legs  still hurt  and  had  therapy  ordered  through home care and they never  contacted  her, unsure  if  was denied  for  home  care, will send  another  order with message  to contact us  about  issue , 12  oxycodone  last  pt  8  days  so  sometimes  takes  1 and sometimes  takes  2, feels she  is  getting better  but  still having  leg  pain and swelling, needs assistance  to do steps and needs to wear  slippers  can't  wear  regular  shoes, unfortunately  started  smoking after sent  home  from hospital      Review of Systems   Constitutional:  Negative for activity change, appetite change and fatigue.   Respiratory:  Negative for shortness of breath.    Cardiovascular:  Negative for chest pain.   Musculoskeletal:  Positive for myalgias.        Lg pain   Neurological:  Negative for dizziness, light-headedness and headaches.       Objective   /90 (BP [place] Left arm, Patient Position: Sitting, Cuff Size: Standard)   Pulse (!) 118   Ht 5' 6\" (1.676 m)   Wt 44.9 kg (99 lb)   LMP 03/14/2019 (Approximate)   SpO2 98%   BMI 15.98 kg/m²      Physical Exam  Vitals reviewed.   Constitutional:       Appearance: Normal appearance.     Cardiovascular:      Rate and Rhythm: Normal rate and regular rhythm.      Pulses: Normal pulses.      Heart sounds: Normal heart sounds.   Pulmonary:      Effort: Pulmonary effort is normal.      Breath sounds: Normal breath sounds.     Musculoskeletal:      Right lower leg: Edema present.      Left lower leg: Edema present.      Comments: " Tenderness  around  ankles and  dorsal surface  of  foot,  1  plus  edema   Lymphadenopathy:      Cervical: No cervical adenopathy.     Neurological:      Mental Status: She is alert.

## 2025-07-25 NOTE — ASSESSMENT & PLAN NOTE
Really  not getting  around well due to  pain, swelling  and  balance issues. Rec  in-home  PT  Orders:    [facility]    gabapentin (NEURONTIN) 400 mg capsule; Take 2 capsules (800 mg total) by mouth 2 (two) times a day TAKE 2 CAPSULES BY MOUTH ONCE DAILY IN THE MORNING AND 2 IN THE EVENING

## 2025-07-25 NOTE — ASSESSMENT & PLAN NOTE
Nneeds  repeat cbc , consult  to hematology  for  elevated platelets  they rec  other  testing

## 2025-07-26 ENCOUNTER — HOME CARE VISIT (OUTPATIENT)
Dept: HOME HEALTH SERVICES | Facility: HOME HEALTHCARE | Age: 55
End: 2025-07-26

## 2025-07-26 NOTE — CASE COMMUNICATION
Left voicemails with patient's 2 listed numbers and texted mobile number requesting visit time for Monday 10am. The other contact numbers for her spouse and daughter are not currently in service.

## 2025-07-28 ENCOUNTER — HOME CARE VISIT (OUTPATIENT)
Dept: HOME HEALTH SERVICES | Facility: HOME HEALTHCARE | Age: 55
End: 2025-07-28
Attending: FAMILY MEDICINE
Payer: COMMERCIAL

## 2025-07-28 ENCOUNTER — OFFICE VISIT (OUTPATIENT)
Dept: FAMILY MEDICINE CLINIC | Facility: CLINIC | Age: 55
End: 2025-07-28
Payer: COMMERCIAL

## 2025-07-28 VITALS
BODY MASS INDEX: 14.01 KG/M2 | HEART RATE: 56 BPM | DIASTOLIC BLOOD PRESSURE: 86 MMHG | OXYGEN SATURATION: 100 % | WEIGHT: 87.2 LBS | HEIGHT: 66 IN | SYSTOLIC BLOOD PRESSURE: 128 MMHG

## 2025-07-28 VITALS
TEMPERATURE: 98.3 F | DIASTOLIC BLOOD PRESSURE: 70 MMHG | HEART RATE: 120 BPM | OXYGEN SATURATION: 99 % | SYSTOLIC BLOOD PRESSURE: 102 MMHG

## 2025-07-28 DIAGNOSIS — R35.0 FREQUENT URINATION: ICD-10-CM

## 2025-07-28 DIAGNOSIS — R10.11 RUQ PAIN: Primary | ICD-10-CM

## 2025-07-28 DIAGNOSIS — K83.8 DILATED BILE DUCT: ICD-10-CM

## 2025-07-28 LAB
SL AMB  POCT GLUCOSE, UA: NORMAL
SL AMB LEUKOCYTE ESTERASE,UA: NEGATIVE
SL AMB POCT BILIRUBIN,UA: NEGATIVE
SL AMB POCT BLOOD,UA: NEGATIVE
SL AMB POCT CLARITY,UA: CLEAR
SL AMB POCT COLOR,UA: YELLOW
SL AMB POCT KETONES,UA: NEGATIVE
SL AMB POCT NITRITE,UA: NEGATIVE
SL AMB POCT PH,UA: 6
SL AMB POCT SPECIFIC GRAVITY,UA: 1.01
SL AMB POCT URINE PROTEIN: NORMAL
SL AMB POCT UROBILINOGEN: 0.2

## 2025-07-28 PROCEDURE — 99214 OFFICE O/P EST MOD 30 MIN: CPT | Performed by: NURSE PRACTITIONER

## 2025-07-28 PROCEDURE — G0151 HHCP-SERV OF PT,EA 15 MIN: HCPCS

## 2025-07-28 PROCEDURE — 400013 VN SOC

## 2025-07-28 PROCEDURE — 81002 URINALYSIS NONAUTO W/O SCOPE: CPT | Performed by: NURSE PRACTITIONER

## 2025-07-28 RX ORDER — BLOOD SUGAR DIAGNOSTIC
STRIP MISCELLANEOUS
COMMUNITY
Start: 2025-07-27

## 2025-07-28 RX ORDER — OMEPRAZOLE 20 MG/1
20 CAPSULE, DELAYED RELEASE ORAL
Qty: 90 CAPSULE | Refills: 0 | OUTPATIENT
Start: 2025-07-28 | End: 2026-01-24

## 2025-07-28 RX ORDER — GABAPENTIN 400 MG/1
CAPSULE ORAL
Qty: 120 CAPSULE | Refills: 0 | OUTPATIENT
Start: 2025-07-28

## 2025-07-30 ENCOUNTER — HOSPITAL ENCOUNTER (OUTPATIENT)
Dept: CT IMAGING | Facility: HOSPITAL | Age: 55
Discharge: HOME | End: 2025-07-30
Attending: NURSE PRACTITIONER
Payer: COMMERCIAL

## 2025-07-30 ENCOUNTER — HOME CARE VISIT (OUTPATIENT)
Dept: HOME HEALTH SERVICES | Facility: HOME HEALTHCARE | Age: 55
End: 2025-07-30
Payer: COMMERCIAL

## 2025-07-30 ENCOUNTER — TELEPHONE (OUTPATIENT)
Dept: FAMILY MEDICINE CLINIC | Facility: CLINIC | Age: 55
End: 2025-07-30

## 2025-07-30 ENCOUNTER — HOSPITAL ENCOUNTER (EMERGENCY)
Facility: HOSPITAL | Age: 55
Discharge: HOME | End: 2025-07-30
Attending: EMERGENCY MEDICINE | Admitting: EMERGENCY MEDICINE
Payer: COMMERCIAL

## 2025-07-30 VITALS
RESPIRATION RATE: 18 BRPM | BODY MASS INDEX: 15.55 KG/M2 | SYSTOLIC BLOOD PRESSURE: 171 MMHG | TEMPERATURE: 98.3 F | WEIGHT: 96.78 LBS | DIASTOLIC BLOOD PRESSURE: 100 MMHG | HEART RATE: 106 BPM | HEIGHT: 66 IN | OXYGEN SATURATION: 98 %

## 2025-07-30 VITALS
TEMPERATURE: 98.3 F | DIASTOLIC BLOOD PRESSURE: 60 MMHG | HEART RATE: 126 BPM | SYSTOLIC BLOOD PRESSURE: 132 MMHG | OXYGEN SATURATION: 98 %

## 2025-07-30 DIAGNOSIS — K83.8 DILATED BILE DUCT: ICD-10-CM

## 2025-07-30 DIAGNOSIS — R18.8 ASCITES OF LIVER: ICD-10-CM

## 2025-07-30 DIAGNOSIS — K83.1 BILIARY OBSTRUCTION: Primary | ICD-10-CM

## 2025-07-30 DIAGNOSIS — R10.11 RUQ PAIN: ICD-10-CM

## 2025-07-30 DIAGNOSIS — R10.9 ABDOMINAL PAIN: Primary | ICD-10-CM

## 2025-07-30 LAB
ALBUMIN SERPL BCG-MCNC: 2.5 G/DL (ref 3.5–5)
ALP SERPL-CCNC: 141 U/L (ref 34–104)
ALT SERPL W P-5'-P-CCNC: 12 U/L (ref 7–52)
ANION GAP SERPL CALCULATED.3IONS-SCNC: 6 MMOL/L (ref 4–13)
ANISOCYTOSIS BLD QL SMEAR: PRESENT
APTT PPP: 31 SECONDS (ref 23–34)
AST SERPL W P-5'-P-CCNC: 15 U/L (ref 13–39)
BASOPHILS # BLD MANUAL: 0.07 THOUSAND/UL (ref 0–0.1)
BASOPHILS NFR MAR MANUAL: 1 % (ref 0–1)
BILIRUB DIRECT SERPL-MCNC: 0.07 MG/DL (ref 0–0.2)
BILIRUB SERPL-MCNC: 0.32 MG/DL (ref 0.2–1)
BUN SERPL-MCNC: 8 MG/DL (ref 5–25)
BURR CELLS BLD QL SMEAR: PRESENT
CALCIUM SERPL-MCNC: 8 MG/DL (ref 8.4–10.2)
CHLORIDE SERPL-SCNC: 102 MMOL/L (ref 96–108)
CO2 SERPL-SCNC: 24 MMOL/L (ref 21–32)
CREAT SERPL-MCNC: 0.58 MG/DL (ref 0.6–1.3)
EOSINOPHIL # BLD MANUAL: 0.07 THOUSAND/UL (ref 0–0.4)
EOSINOPHIL NFR BLD MANUAL: 1 % (ref 0–6)
ERYTHROCYTE [DISTWIDTH] IN BLOOD BY AUTOMATED COUNT: 16.1 % (ref 11.6–15.1)
GFR SERPL CREATININE-BSD FRML MDRD: 103 ML/MIN/1.73SQ M
GLUCOSE SERPL-MCNC: 236 MG/DL (ref 65–140)
HCT VFR BLD AUTO: 27.1 % (ref 34.8–46.1)
HGB BLD-MCNC: 8.8 G/DL (ref 11.5–15.4)
HYPERCHROMIA BLD QL SMEAR: PRESENT
INR PPP: 0.94 (ref 0.85–1.19)
LG PLATELETS BLD QL SMEAR: PRESENT
LIPASE SERPL-CCNC: 39 U/L (ref 11–82)
LYMPHOCYTES # BLD AUTO: 2.07 THOUSAND/UL (ref 0.6–4.47)
LYMPHOCYTES # BLD AUTO: 29 % (ref 14–44)
MACROCYTES BLD QL AUTO: PRESENT
MCH RBC QN AUTO: 30.9 PG (ref 26.8–34.3)
MCHC RBC AUTO-ENTMCNC: 32.5 G/DL (ref 31.4–37.4)
MCV RBC AUTO: 95 FL (ref 82–98)
MONOCYTES # BLD AUTO: 0.21 THOUSAND/UL (ref 0–1.22)
MONOCYTES NFR BLD: 3 % (ref 4–12)
NEUTROPHILS # BLD MANUAL: 4.71 THOUSAND/UL (ref 1.85–7.62)
NEUTS SEG NFR BLD AUTO: 66 % (ref 43–75)
OVALOCYTES BLD QL SMEAR: PRESENT
PLATELET # BLD AUTO: 411 THOUSANDS/UL (ref 149–390)
PLATELET BLD QL SMEAR: ABNORMAL
PMV BLD AUTO: 9.3 FL (ref 8.9–12.7)
POLYCHROMASIA BLD QL SMEAR: PRESENT
POTASSIUM SERPL-SCNC: 3.2 MMOL/L (ref 3.5–5.3)
PROT SERPL-MCNC: 5.6 G/DL (ref 6.4–8.4)
PROTHROMBIN TIME: 12.8 SECONDS (ref 12.3–15)
RBC # BLD AUTO: 2.85 MILLION/UL (ref 3.81–5.12)
RBC MORPH BLD: PRESENT
SODIUM SERPL-SCNC: 132 MMOL/L (ref 135–147)
TARGETS BLD QL SMEAR: PRESENT
WBC # BLD AUTO: 7.14 THOUSAND/UL (ref 4.31–10.16)

## 2025-07-30 PROCEDURE — 99285 EMERGENCY DEPT VISIT HI MDM: CPT | Performed by: EMERGENCY MEDICINE

## 2025-07-30 PROCEDURE — 80076 HEPATIC FUNCTION PANEL: CPT | Performed by: EMERGENCY MEDICINE

## 2025-07-30 PROCEDURE — 85027 COMPLETE CBC AUTOMATED: CPT | Performed by: EMERGENCY MEDICINE

## 2025-07-30 PROCEDURE — 74177 CT ABD & PELVIS W/CONTRAST: CPT

## 2025-07-30 PROCEDURE — 80048 BASIC METABOLIC PNL TOTAL CA: CPT | Performed by: EMERGENCY MEDICINE

## 2025-07-30 PROCEDURE — G0151 HHCP-SERV OF PT,EA 15 MIN: HCPCS

## 2025-07-30 PROCEDURE — 83690 ASSAY OF LIPASE: CPT | Performed by: EMERGENCY MEDICINE

## 2025-07-30 PROCEDURE — 36415 COLL VENOUS BLD VENIPUNCTURE: CPT | Performed by: EMERGENCY MEDICINE

## 2025-07-30 PROCEDURE — 96374 THER/PROPH/DIAG INJ IV PUSH: CPT

## 2025-07-30 PROCEDURE — 85730 THROMBOPLASTIN TIME PARTIAL: CPT | Performed by: EMERGENCY MEDICINE

## 2025-07-30 PROCEDURE — 99283 EMERGENCY DEPT VISIT LOW MDM: CPT

## 2025-07-30 PROCEDURE — 85007 BL SMEAR W/DIFF WBC COUNT: CPT | Performed by: EMERGENCY MEDICINE

## 2025-07-30 PROCEDURE — 85610 PROTHROMBIN TIME: CPT | Performed by: EMERGENCY MEDICINE

## 2025-07-30 RX ORDER — MAGNESIUM HYDROXIDE/ALUMINUM HYDROXICE/SIMETHICONE 120; 1200; 1200 MG/30ML; MG/30ML; MG/30ML
30 SUSPENSION ORAL ONCE
Status: DISCONTINUED | OUTPATIENT
Start: 2025-07-30 | End: 2025-07-30 | Stop reason: HOSPADM

## 2025-07-30 RX ORDER — DICYCLOMINE HCL 20 MG
20 TABLET ORAL 2 TIMES DAILY
Qty: 10 TABLET | Refills: 0 | Status: SHIPPED | OUTPATIENT
Start: 2025-07-30 | End: 2025-08-04

## 2025-07-30 RX ORDER — DICYCLOMINE HCL 20 MG
20 TABLET ORAL ONCE
Status: COMPLETED | OUTPATIENT
Start: 2025-07-30 | End: 2025-07-30

## 2025-07-30 RX ADMIN — MORPHINE SULFATE 2 MG: 2 INJECTION, SOLUTION INTRAMUSCULAR; INTRAVENOUS at 17:33

## 2025-07-30 RX ADMIN — DICYCLOMINE HYDROCHLORIDE 20 MG: 20 TABLET ORAL at 18:43

## 2025-07-30 RX ADMIN — IOHEXOL 85 ML: 350 INJECTION, SOLUTION INTRAVENOUS at 12:23

## 2025-07-31 ENCOUNTER — HOME CARE VISIT (OUTPATIENT)
Dept: HOME HEALTH SERVICES | Facility: HOME HEALTHCARE | Age: 55
End: 2025-07-31
Payer: COMMERCIAL

## 2025-07-31 DIAGNOSIS — K83.8 DILATED CBD, ACQUIRED: ICD-10-CM

## 2025-08-01 ENCOUNTER — HOME CARE VISIT (OUTPATIENT)
Dept: HOME HEALTH SERVICES | Facility: HOME HEALTHCARE | Age: 55
End: 2025-08-01
Payer: COMMERCIAL

## 2025-08-01 RX ORDER — OXYCODONE HYDROCHLORIDE 5 MG/1
5 TABLET ORAL EVERY 8 HOURS PRN
Qty: 12 TABLET | Refills: 0 | OUTPATIENT
Start: 2025-08-01

## 2025-08-04 ENCOUNTER — HOME CARE VISIT (OUTPATIENT)
Dept: HOME HEALTH SERVICES | Facility: HOME HEALTHCARE | Age: 55
End: 2025-08-04
Payer: COMMERCIAL

## 2025-08-04 PROCEDURE — G0180 MD CERTIFICATION HHA PATIENT: HCPCS | Performed by: FAMILY MEDICINE

## 2025-08-05 ENCOUNTER — HOME CARE VISIT (OUTPATIENT)
Dept: HOME HEALTH SERVICES | Facility: HOME HEALTHCARE | Age: 55
End: 2025-08-05
Payer: COMMERCIAL

## 2025-08-05 VITALS
OXYGEN SATURATION: 100 % | DIASTOLIC BLOOD PRESSURE: 100 MMHG | SYSTOLIC BLOOD PRESSURE: 182 MMHG | HEART RATE: 102 BPM | TEMPERATURE: 97.7 F

## 2025-08-05 PROCEDURE — G0151 HHCP-SERV OF PT,EA 15 MIN: HCPCS

## 2025-08-06 DIAGNOSIS — F41.1 GENERALIZED ANXIETY DISORDER: ICD-10-CM

## 2025-08-06 DIAGNOSIS — F33.2 SEVERE EPISODE OF RECURRENT MAJOR DEPRESSIVE DISORDER, WITHOUT PSYCHOTIC FEATURES (HCC): ICD-10-CM

## 2025-08-06 RX ORDER — VENLAFAXINE HYDROCHLORIDE 150 MG/1
CAPSULE, EXTENDED RELEASE ORAL
Qty: 30 CAPSULE | Refills: 1 | Status: SHIPPED | OUTPATIENT
Start: 2025-08-06

## 2025-08-07 ENCOUNTER — HOME CARE VISIT (OUTPATIENT)
Dept: HOME HEALTH SERVICES | Facility: HOME HEALTHCARE | Age: 55
End: 2025-08-07
Payer: COMMERCIAL

## 2025-08-07 VITALS
SYSTOLIC BLOOD PRESSURE: 150 MMHG | DIASTOLIC BLOOD PRESSURE: 82 MMHG | OXYGEN SATURATION: 98 % | HEART RATE: 109 BPM | TEMPERATURE: 98.2 F

## 2025-08-07 PROCEDURE — G0151 HHCP-SERV OF PT,EA 15 MIN: HCPCS

## 2025-08-08 ENCOUNTER — OFFICE VISIT (OUTPATIENT)
Dept: FAMILY MEDICINE CLINIC | Facility: CLINIC | Age: 55
End: 2025-08-08
Payer: COMMERCIAL

## 2025-08-08 ENCOUNTER — TELEPHONE (OUTPATIENT)
Age: 55
End: 2025-08-08

## 2025-08-08 VITALS
HEIGHT: 66 IN | SYSTOLIC BLOOD PRESSURE: 160 MMHG | BODY MASS INDEX: 15.11 KG/M2 | WEIGHT: 94 LBS | DIASTOLIC BLOOD PRESSURE: 108 MMHG | HEART RATE: 113 BPM | OXYGEN SATURATION: 98 %

## 2025-08-08 DIAGNOSIS — E83.51 HYPOCALCEMIA: ICD-10-CM

## 2025-08-08 DIAGNOSIS — E87.1 HYPONATREMIA: ICD-10-CM

## 2025-08-08 DIAGNOSIS — E87.6 HYPOKALEMIA: ICD-10-CM

## 2025-08-08 DIAGNOSIS — K83.8 DILATED CBD, ACQUIRED: ICD-10-CM

## 2025-08-08 DIAGNOSIS — D64.9 ANEMIA, UNSPECIFIED TYPE: ICD-10-CM

## 2025-08-08 DIAGNOSIS — R73.02 IMPAIRED GLUCOSE TOLERANCE: ICD-10-CM

## 2025-08-08 DIAGNOSIS — E44.1 MILD PROTEIN-CALORIE MALNUTRITION (HCC): ICD-10-CM

## 2025-08-08 DIAGNOSIS — K74.60 CIRRHOSIS OF LIVER WITHOUT ASCITES, UNSPECIFIED HEPATIC CIRRHOSIS TYPE (HCC): ICD-10-CM

## 2025-08-08 DIAGNOSIS — I10 PRIMARY HYPERTENSION: Primary | ICD-10-CM

## 2025-08-08 DIAGNOSIS — R60.0 PEDAL EDEMA: ICD-10-CM

## 2025-08-08 PROBLEM — R73.9 HYPERGLYCEMIA: Status: RESOLVED | Noted: 2022-07-14 | Resolved: 2025-08-08

## 2025-08-08 PROBLEM — R78.81 GRAM-NEGATIVE BACTEREMIA: Status: RESOLVED | Noted: 2025-06-28 | Resolved: 2025-08-08

## 2025-08-08 PROBLEM — R19.7 DIARRHEA: Status: RESOLVED | Noted: 2022-10-03 | Resolved: 2025-08-08

## 2025-08-08 PROCEDURE — 99214 OFFICE O/P EST MOD 30 MIN: CPT | Performed by: FAMILY MEDICINE

## 2025-08-08 RX ORDER — VALSARTAN 80 MG/1
80 TABLET ORAL DAILY
Qty: 100 TABLET | Refills: 3 | Status: SHIPPED | OUTPATIENT
Start: 2025-08-08

## 2025-08-08 RX ORDER — OXYCODONE HYDROCHLORIDE 5 MG/1
5 TABLET ORAL EVERY 8 HOURS PRN
Qty: 20 TABLET | Refills: 0 | Status: SHIPPED | OUTPATIENT
Start: 2025-08-08

## 2025-08-11 ENCOUNTER — HOME CARE VISIT (OUTPATIENT)
Dept: HOME HEALTH SERVICES | Facility: HOME HEALTHCARE | Age: 55
End: 2025-08-11
Payer: COMMERCIAL

## 2025-08-13 ENCOUNTER — HOME CARE VISIT (OUTPATIENT)
Dept: HOME HEALTH SERVICES | Facility: HOME HEALTHCARE | Age: 55
End: 2025-08-13
Payer: COMMERCIAL

## 2025-08-15 ENCOUNTER — HOME CARE VISIT (OUTPATIENT)
Dept: HOME HEALTH SERVICES | Facility: HOME HEALTHCARE | Age: 55
End: 2025-08-15
Payer: COMMERCIAL

## 2025-08-19 ENCOUNTER — HOME CARE VISIT (OUTPATIENT)
Dept: HOME HEALTH SERVICES | Facility: HOME HEALTHCARE | Age: 55
End: 2025-08-19
Payer: COMMERCIAL

## 2025-08-21 ENCOUNTER — TELEPHONE (OUTPATIENT)
Dept: GASTROENTEROLOGY | Facility: CLINIC | Age: 55
End: 2025-08-21

## 2025-08-21 DIAGNOSIS — R10.9 ABDOMINAL PAIN: ICD-10-CM

## 2025-08-21 RX ORDER — DICYCLOMINE HCL 20 MG
20 TABLET ORAL 2 TIMES DAILY
Qty: 10 TABLET | Refills: 0 | Status: SHIPPED | OUTPATIENT
Start: 2025-08-21 | End: 2025-08-26

## 2025-08-22 ENCOUNTER — HOME CARE VISIT (OUTPATIENT)
Dept: HOME HEALTH SERVICES | Facility: HOME HEALTHCARE | Age: 55
End: 2025-08-22
Payer: COMMERCIAL

## 2025-08-25 PROBLEM — D72.829 LEUKOCYTOSIS: Status: RESOLVED | Noted: 2025-07-23 | Resolved: 2025-08-25

## 2025-08-26 PROBLEM — F12.90 MARIJUANA USE: Status: ACTIVE | Noted: 2025-08-26
